# Patient Record
Sex: MALE | Race: WHITE | Employment: OTHER | URBAN - METROPOLITAN AREA
[De-identification: names, ages, dates, MRNs, and addresses within clinical notes are randomized per-mention and may not be internally consistent; named-entity substitution may affect disease eponyms.]

---

## 2017-03-18 ENCOUNTER — APPOINTMENT (OUTPATIENT)
Dept: GENERAL RADIOLOGY | Age: 72
DRG: 190 | End: 2017-03-18
Attending: FAMILY MEDICINE
Payer: MEDICARE

## 2017-03-18 ENCOUNTER — HOSPITAL ENCOUNTER (INPATIENT)
Age: 72
LOS: 5 days | Discharge: HOME HEALTH | DRG: 190 | End: 2017-03-24
Attending: FAMILY MEDICINE | Admitting: INTERNAL MEDICINE
Payer: MEDICARE

## 2017-03-18 DIAGNOSIS — J44.1 ACUTE EXACERBATION OF CHRONIC OBSTRUCTIVE PULMONARY DISEASE (COPD) (HCC): Primary | ICD-10-CM

## 2017-03-18 DIAGNOSIS — R06.03 RESPIRATORY DISTRESS: ICD-10-CM

## 2017-03-18 LAB
ALBUMIN SERPL BCP-MCNC: 3.2 G/DL (ref 3.4–5)
ALBUMIN/GLOB SERPL: 0.9 {RATIO} (ref 0.8–1.7)
ALP SERPL-CCNC: 67 U/L (ref 45–117)
ALT SERPL-CCNC: 21 U/L (ref 16–61)
ANION GAP BLD CALC-SCNC: 14 MMOL/L (ref 3–18)
AST SERPL W P-5'-P-CCNC: 16 U/L (ref 15–37)
BASOPHILS # BLD AUTO: 0.1 K/UL (ref 0–0.06)
BASOPHILS # BLD: 1 % (ref 0–2)
BILIRUB SERPL-MCNC: 0.6 MG/DL (ref 0.2–1)
BNP SERPL-MCNC: 56 PG/ML (ref 0–900)
BUN SERPL-MCNC: 24 MG/DL (ref 7–18)
BUN/CREAT SERPL: 14 (ref 12–20)
CALCIUM SERPL-MCNC: 8.9 MG/DL (ref 8.5–10.1)
CHLORIDE SERPL-SCNC: 102 MMOL/L (ref 100–108)
CK MB CFR SERPL CALC: 2.4 % (ref 0–4)
CK MB SERPL-MCNC: 7.1 NG/ML (ref 5–25)
CK SERPL-CCNC: 294 U/L (ref 39–308)
CO2 SERPL-SCNC: 22 MMOL/L (ref 21–32)
CREAT SERPL-MCNC: 1.67 MG/DL (ref 0.6–1.3)
DIFFERENTIAL METHOD BLD: ABNORMAL
EOSINOPHIL # BLD: 0.7 K/UL (ref 0–0.4)
EOSINOPHIL NFR BLD: 8 % (ref 0–5)
ERYTHROCYTE [DISTWIDTH] IN BLOOD BY AUTOMATED COUNT: 14 % (ref 11.6–14.5)
EST. AVERAGE GLUCOSE BLD GHB EST-MCNC: 123 MG/DL
GLOBULIN SER CALC-MCNC: 3.7 G/DL (ref 2–4)
GLUCOSE BLD STRIP.AUTO-MCNC: 200 MG/DL (ref 70–110)
GLUCOSE SERPL-MCNC: 108 MG/DL (ref 74–99)
HBA1C MFR BLD: 5.9 % (ref 4.5–5.6)
HCT VFR BLD AUTO: 36.1 % (ref 36–48)
HGB BLD-MCNC: 12.3 G/DL (ref 13–16)
LYMPHOCYTES # BLD AUTO: 7 % (ref 21–52)
LYMPHOCYTES # BLD: 0.6 K/UL (ref 0.9–3.6)
MCH RBC QN AUTO: 30.7 PG (ref 24–34)
MCHC RBC AUTO-ENTMCNC: 34.1 G/DL (ref 31–37)
MCV RBC AUTO: 90 FL (ref 74–97)
MONOCYTES # BLD: 1.3 K/UL (ref 0.05–1.2)
MONOCYTES NFR BLD AUTO: 16 % (ref 3–10)
NEUTS SEG # BLD: 5.6 K/UL (ref 1.8–8)
NEUTS SEG NFR BLD AUTO: 68 % (ref 40–73)
PLATELET # BLD AUTO: 221 K/UL (ref 135–420)
PMV BLD AUTO: 8.8 FL (ref 9.2–11.8)
POTASSIUM SERPL-SCNC: 3.9 MMOL/L (ref 3.5–5.5)
PROT SERPL-MCNC: 6.9 G/DL (ref 6.4–8.2)
RBC # BLD AUTO: 4.01 M/UL (ref 4.7–5.5)
RBC MORPH BLD: ABNORMAL
SODIUM SERPL-SCNC: 138 MMOL/L (ref 136–145)
TROPONIN I SERPL-MCNC: <0.02 NG/ML (ref 0–0.06)
WBC # BLD AUTO: 8.3 K/UL (ref 4.6–13.2)

## 2017-03-18 PROCEDURE — 74011000250 HC RX REV CODE- 250: Performed by: FAMILY MEDICINE

## 2017-03-18 PROCEDURE — 85025 COMPLETE CBC W/AUTO DIFF WBC: CPT | Performed by: FAMILY MEDICINE

## 2017-03-18 PROCEDURE — 99285 EMERGENCY DEPT VISIT HI MDM: CPT

## 2017-03-18 PROCEDURE — 74011250637 HC RX REV CODE- 250/637: Performed by: INTERNAL MEDICINE

## 2017-03-18 PROCEDURE — 74011250636 HC RX REV CODE- 250/636: Performed by: INTERNAL MEDICINE

## 2017-03-18 PROCEDURE — 83036 HEMOGLOBIN GLYCOSYLATED A1C: CPT | Performed by: INTERNAL MEDICINE

## 2017-03-18 PROCEDURE — 94640 AIRWAY INHALATION TREATMENT: CPT

## 2017-03-18 PROCEDURE — 99218 HC RM OBSERVATION: CPT

## 2017-03-18 PROCEDURE — 77030013140 HC MSK NEB VYRM -A

## 2017-03-18 PROCEDURE — 74011250636 HC RX REV CODE- 250/636: Performed by: FAMILY MEDICINE

## 2017-03-18 PROCEDURE — 96374 THER/PROPH/DIAG INJ IV PUSH: CPT

## 2017-03-18 PROCEDURE — 80053 COMPREHEN METABOLIC PANEL: CPT | Performed by: FAMILY MEDICINE

## 2017-03-18 PROCEDURE — 96375 TX/PRO/DX INJ NEW DRUG ADDON: CPT

## 2017-03-18 PROCEDURE — 94760 N-INVAS EAR/PLS OXIMETRY 1: CPT

## 2017-03-18 PROCEDURE — 93005 ELECTROCARDIOGRAM TRACING: CPT

## 2017-03-18 PROCEDURE — 83880 ASSAY OF NATRIURETIC PEPTIDE: CPT | Performed by: FAMILY MEDICINE

## 2017-03-18 PROCEDURE — 74011000250 HC RX REV CODE- 250: Performed by: INTERNAL MEDICINE

## 2017-03-18 PROCEDURE — 82550 ASSAY OF CK (CPK): CPT | Performed by: FAMILY MEDICINE

## 2017-03-18 PROCEDURE — 82962 GLUCOSE BLOOD TEST: CPT

## 2017-03-18 PROCEDURE — 71010 XR CHEST PORT: CPT

## 2017-03-18 RX ORDER — FUROSEMIDE 10 MG/ML
40 INJECTION INTRAMUSCULAR; INTRAVENOUS
Status: COMPLETED | OUTPATIENT
Start: 2017-03-18 | End: 2017-03-18

## 2017-03-18 RX ORDER — IPRATROPIUM BROMIDE AND ALBUTEROL SULFATE 2.5; .5 MG/3ML; MG/3ML
3 SOLUTION RESPIRATORY (INHALATION)
Status: DISCONTINUED | OUTPATIENT
Start: 2017-03-18 | End: 2017-03-21

## 2017-03-18 RX ORDER — LEVOFLOXACIN 5 MG/ML
500 INJECTION, SOLUTION INTRAVENOUS EVERY 24 HOURS
Status: DISCONTINUED | OUTPATIENT
Start: 2017-03-18 | End: 2017-03-22

## 2017-03-18 RX ORDER — INSULIN LISPRO 100 [IU]/ML
INJECTION, SOLUTION INTRAVENOUS; SUBCUTANEOUS
Status: DISCONTINUED | OUTPATIENT
Start: 2017-03-18 | End: 2017-03-24 | Stop reason: HOSPADM

## 2017-03-18 RX ORDER — ALBUTEROL SULFATE 0.83 MG/ML
5 SOLUTION RESPIRATORY (INHALATION) ONCE
Status: COMPLETED | OUTPATIENT
Start: 2017-03-18 | End: 2017-03-18

## 2017-03-18 RX ORDER — ALBUTEROL SULFATE 0.83 MG/ML
2.5 SOLUTION RESPIRATORY (INHALATION)
Status: DISCONTINUED | OUTPATIENT
Start: 2017-03-18 | End: 2017-03-24 | Stop reason: HOSPADM

## 2017-03-18 RX ORDER — HEPARIN SODIUM 5000 [USP'U]/ML
5000 INJECTION, SOLUTION INTRAVENOUS; SUBCUTANEOUS EVERY 8 HOURS
Status: DISCONTINUED | OUTPATIENT
Start: 2017-03-18 | End: 2017-03-24 | Stop reason: HOSPADM

## 2017-03-18 RX ORDER — ACETAMINOPHEN 325 MG/1
650 TABLET ORAL
Status: DISCONTINUED | OUTPATIENT
Start: 2017-03-18 | End: 2017-03-24 | Stop reason: HOSPADM

## 2017-03-18 RX ORDER — DEXTROSE 50 % IN WATER (D50W) INTRAVENOUS SYRINGE
25-50 AS NEEDED
Status: DISCONTINUED | OUTPATIENT
Start: 2017-03-18 | End: 2017-03-24 | Stop reason: HOSPADM

## 2017-03-18 RX ORDER — MAGNESIUM SULFATE 100 %
4 CRYSTALS MISCELLANEOUS AS NEEDED
Status: DISCONTINUED | OUTPATIENT
Start: 2017-03-18 | End: 2017-03-24 | Stop reason: HOSPADM

## 2017-03-18 RX ORDER — SODIUM CHLORIDE 9 MG/ML
75 INJECTION, SOLUTION INTRAVENOUS CONTINUOUS
Status: DISPENSED | OUTPATIENT
Start: 2017-03-18 | End: 2017-03-19

## 2017-03-18 RX ORDER — DOXAZOSIN 1 MG/1
2 TABLET ORAL DAILY
Status: DISCONTINUED | OUTPATIENT
Start: 2017-03-19 | End: 2017-03-24 | Stop reason: HOSPADM

## 2017-03-18 RX ORDER — FLUTICASONE FUROATE AND VILANTEROL 100; 25 UG/1; UG/1
1 POWDER RESPIRATORY (INHALATION) DAILY
Status: DISCONTINUED | OUTPATIENT
Start: 2017-03-19 | End: 2017-03-24 | Stop reason: HOSPADM

## 2017-03-18 RX ORDER — IPRATROPIUM BROMIDE AND ALBUTEROL SULFATE 2.5; .5 MG/3ML; MG/3ML
3 SOLUTION RESPIRATORY (INHALATION)
Status: COMPLETED | OUTPATIENT
Start: 2017-03-18 | End: 2017-03-18

## 2017-03-18 RX ORDER — LISINOPRIL 20 MG/1
40 TABLET ORAL DAILY
Status: DISCONTINUED | OUTPATIENT
Start: 2017-03-19 | End: 2017-03-19

## 2017-03-18 RX ORDER — CHLORTHALIDONE 25 MG/1
25 TABLET ORAL DAILY
Status: DISCONTINUED | OUTPATIENT
Start: 2017-03-19 | End: 2017-03-24 | Stop reason: HOSPADM

## 2017-03-18 RX ADMIN — INSULIN LISPRO 4 UNITS: 100 INJECTION, SOLUTION INTRAVENOUS; SUBCUTANEOUS at 21:42

## 2017-03-18 RX ADMIN — FUROSEMIDE 40 MG: 10 INJECTION, SOLUTION INTRAMUSCULAR; INTRAVENOUS at 16:42

## 2017-03-18 RX ADMIN — IPRATROPIUM BROMIDE AND ALBUTEROL SULFATE 3 ML: .5; 3 SOLUTION RESPIRATORY (INHALATION) at 21:17

## 2017-03-18 RX ADMIN — LEVOFLOXACIN 500 MG: 5 INJECTION, SOLUTION INTRAVENOUS at 19:30

## 2017-03-18 RX ADMIN — METHYLPREDNISOLONE SODIUM SUCCINATE 125 MG: 125 INJECTION, POWDER, FOR SOLUTION INTRAMUSCULAR; INTRAVENOUS at 16:42

## 2017-03-18 RX ADMIN — HEPARIN SODIUM 5000 UNITS: 5000 INJECTION, SOLUTION INTRAVENOUS; SUBCUTANEOUS at 19:29

## 2017-03-18 RX ADMIN — ALBUTEROL SULFATE 5 MG: 2.5 SOLUTION RESPIRATORY (INHALATION) at 21:17

## 2017-03-18 RX ADMIN — ALBUTEROL SULFATE 5 MG: 2.5 SOLUTION RESPIRATORY (INHALATION) at 16:50

## 2017-03-18 RX ADMIN — Medication 2 SPRAY: at 19:33

## 2017-03-18 RX ADMIN — SODIUM CHLORIDE 75 ML/HR: 900 INJECTION, SOLUTION INTRAVENOUS at 19:30

## 2017-03-18 RX ADMIN — METHYLPREDNISOLONE SODIUM SUCCINATE 40 MG: 40 INJECTION, POWDER, FOR SOLUTION INTRAMUSCULAR; INTRAVENOUS at 21:42

## 2017-03-18 RX ADMIN — IPRATROPIUM BROMIDE AND ALBUTEROL SULFATE 3 ML: .5; 3 SOLUTION RESPIRATORY (INHALATION) at 16:41

## 2017-03-18 NOTE — H&P
History & Physical    Patient: Ines Howell MRN: [id number]  CSN: [id number]    YOB: 1945  Age: 66 y.o. Sex: male      DOA: 3/18/2017  Primary Care Provider:  Macario Millan MD      Assessment/Plan   1. COPD exacerbation  2. SInusitis  3. Renal insufficiency  4. Scarring R lung, chronic  5 presbyacusis    PLAN:  - Admit to medical service with telemetry monitoring  - start steroids, antibiotics, scheudled nebs  - saline nasal spray  - monitor sugar ac/hs  - gentle fluids  - mobilize w pt/ot  0 full code, dvt ppx heparin          Patient Active Problem List   Diagnosis Code    COPD (chronic obstructive pulmonary disease) ([address] J44.9    URIEL (acute kidney injury) ([facility] N17.9    Hypertension I10     HPI:   CC: shortness of breath  Ines Howell is a 66 y.o. male with past medical history significant for COPD, HTn, prostate ca presents to  ER w 3 days of worsening shortness of breath and cough. stared w nasal congestion but no sputum producible. No alleviating or aggravating factors. No fever, chills. Denies pain, palpitations. On presentation to the ER he was afebrile, tachycaridc satting in mid 90s while on supplemental oxygen. He had expiratory wheezes diffusely that persisted despite neb treatments. CXR w R LL scarring unchanged from prior. EKG with out acute finding, cr mildly elevated. Medicine is asked to admit for further management.        Past Medical History:   Diagnosis Date    Cancer [facility])     prostate    COPD (chronic obstructive pulmonary disease) ([facility] [address]     Hypertension     Pneumonia     Radiation effect      Past Surgical History:   Procedure Laterality Date    HX HERNIA REPAIR        Social History   Substance Use Topics    Smoking status: Current Every Day Smoker     Types: Cigarettes    Smokeless tobacco: None    Alcohol use No     Family History   Problem Relation Age of Onset    Heart Disease Mother      No current facility-administered medications on file prior to encounter. Current Outpatient Prescriptions on File Prior to Encounter   Medication Sig Dispense Refill    albuterol (PROVENTIL VENTOLIN) 2.5 mg /3 mL (0.083 %) nebulizer solution 3 mL by Nebulization route every four (4) hours as needed for Wheezing. 24 Each 0    budesonide-formoterol (SYMBICORT) 160-4.5 mcg/actuation HFA inhaler Take 2 Puffs by inhalation two (2) times a day. 1 Inhaler 0    lisinopril (PRINIVIL, ZESTRIL) 40 mg tablet Take 40 mg by mouth daily.  chlorthalidone (HYGROTEN) 25 mg tablet Take  by mouth daily.  albuterol (PROVENTIL HFA, VENTOLIN HFA, PROAIR HFA) 90 mcg/actuation inhaler Take 2 Puffs by inhalation every four (4) hours as needed for Wheezing or Shortness of Breath. 1 Inhaler 1    DOXAZOSIN MESYLATE (CARDURA PO) Take  by mouth.       Nebulizer & Compressor machine Dispense: 1 nebulizer machine w all tubing necessary 1 each 0      Allergies   Allergen Reactions    Aspirin Other (comments)     \"messes my stomach up\"         Review of Systems  Constitutional: No fever, chills, diaphoresis, malaise,+ fatigue - weight gain/loss or falls  Skin: no itching or rashes  HEENT: no neck stiffness, +hearing loss,- tinnitus, epistaxis or sore throat  EYES: no blurry vision, double vision or photophobia  CARDIOVASCULAR: no, cp, palpitations, orthopnea, pnd or LE edema  PULMONARY: no cough, wheeze, shortness of breath or sputum production  GI: no nausea, vomiting, diarrhea, abdominal pain, melena, hematemesis or brbpr  : no dysuria, hematuria  MUSCULOSKELETAL: no back pain, joint pain or myalgias  ENDOCRINE: no heat/cold intolerance, polyuria or polydipsia  HEME: no easy bruising or bleeding  NEURO: no unilateral weakness, numbness, tingling or seizures      Physical Exam:        Visit Vitals    /67    Pulse 88    Temp 97.9 °F (36.6 °C)    Resp 23    Ht 5' 9\" (1.753 m)    Wt 90.4 kg (199 lb 4.8 oz)    SpO2 96%    BMI 29.43 kg/m2    O2 Flow Rate (L/min): 1.5 l/min O2 Device: Aerosol mask    Temp (24hrs), Av.9 °F (36.6 °C), Min:97.9 °F (36.6 °C), Max:97.9 °F (36.6 °C)           Body mass index is 29.43 kg/(m^2). General:  Awake, cooperative, no distress. Head:  Normocephalic, without obvious abnormality, atraumatic. Eyes:  Conjunctivae/corneas clear, sclera anicteric, PERRL, EOMs intact. Nose: Nares normal. No drainage or sinus tenderness. Throat: Lips, mucosa, and tongue normal. .   Neck: Supple, symmetrical, trachea midline, no adenopathy. \   Lungs:   Scattered expiratory wheezes bilaterally, equal expansion       Heart:  Regular rate and rhythm, S1, S2 normal, no murmur, click, rub or gallop, cap refill normal      Abdomen: Soft, non-tender. Bowel sounds normal. No masses,  No organomegaly. Extremities: Extremities normal, atraumatic, no cyanosis or edema. Pulses: 2+ and symmetric all extremities. Skin: Skin color pale, texture, turgor normal. No rashes or lesions   Neurologic: CNII-XII intact. No focal motor or sensory deficit.            Laboratory Studies:    CMP:   Lab Results   Component Value Date/Time     2017 04:30 PM    K 3.9 2017 04:30 PM     2017 04:30 PM    CO2 22 2017 04:30 PM    AGAP 14 2017 04:30 PM     (H) 2017 04:30 PM    BUN 24 (H) 2017 04:30 PM    CREA 1.67 (H) 2017 04:30 PM    GFRAA 49 (L) 2017 04:30 PM    GFRNA 41 (L) 2017 04:30 PM    CA 8.9 2017 04:30 PM    ALB 3.2 (L) 2017 04:30 PM    TP 6.9 2017 04:30 PM    GLOB 3.7 2017 04:30 PM    AGRAT 0.9 2017 04:30 PM    SGOT 16 2017 04:30 PM    ALT 21 2017 04:30 PM     CBC:   Lab Results   Component Value Date/Time    WBC 8.3 2017 04:30 PM    HGB 12.3 (L) 2017 04:30 PM    HCT 36.1 2017 04:30 PM     2017 04:30 PM       Imaging studies personally reviewed:  CXR: RLL mickey Jones DO  Internal Medicine/Geriatrics

## 2017-03-18 NOTE — ED PROVIDER NOTES
[address]  EMERGENCY DEPARTMENT HISTORY AND PHYSICAL EXAM       Date: 3/18/2017   Patient Name: Danni Mckenna   YOB: 1945  Medical Record Number: [id number]    History of Presenting Illness     No chief complaint on file. History Provided By:  patient and spouse    Additional History:   4:25 PM   Danni Mckenna is a 66 y.o. male h/o COPD who presents to the emergency department C/O gradually worsening SOB, onset 1 week ago. Symptoms include chest tightness and LE edema. Per wife, this is the worse the pt's breathing has ever been. Pt smoked 2 puffs of a cigarette this morning. Pt denies nausea, vomiting, and any other symptoms or complaints. Primary Care Provider: Juliet Chacko MD   Specialist:    Past History     Past Medical History:   Past Medical History:   Diagnosis Date    COPD (chronic obstructive pulmonary disease) ([address]     Hypertension     Pneumonia         Past Surgical History:   Past Surgical History:   Procedure Laterality Date    HX HERNIA REPAIR          Family History:   Family History   Problem Relation Age of Onset    Heart Disease Mother         Social History:   Social History   Substance Use Topics    Smoking status: Current Every Day Smoker     Types: Cigarettes    Smokeless tobacco: Not on file    Alcohol use No        Allergies: Allergies   Allergen Reactions    Aspirin Other (comments)     \"messes my stomach up\"        Review of Systems   Review of Systems   Constitutional: Negative for fatigue and fever. HENT: Negative for rhinorrhea and sore throat. Respiratory: Positive for chest tightness. Negative for cough and shortness of breath. Cardiovascular: Positive for leg swelling. Negative for chest pain and palpitations. Gastrointestinal: Negative for abdominal pain, diarrhea, nausea and vomiting. Genitourinary: Negative for difficulty urinating and dysuria.    Musculoskeletal: Negative for arthralgias and myalgias. Skin: Negative for color change and rash. Neurological: Negative for light-headedness and headaches. All other systems reviewed and are negative. Physical Exam  There were no vitals filed for this visit. Physical Exam   Nursing note and vitals reviewed. Vital signs and nursing notes reviewed    CONSTITUTIONAL: Alert, in moderate respiratory distress; well-developed; well-nourished. Elderly male. HEAD:  Normocephalic, atraumatic  EYES: PERRL; EOM's intact. ENTM: Nose: no rhinorrhea; Throat: no erythema or exudate, mucous membranes moist  Neck:  No JVD, supple without lymphadenopathy  RESP: Chest with diffuse wheezing, equal breath sounds. Tachypnic. CV: S1 and S2 WNL; No murmurs, gallops or rubs. Tachycardic. GI: Normal bowel sounds, abdomen soft and non-tender. No masses or organomegaly. : No costo-vertebral angle tenderness. BACK:  Non-tender  UPPER EXT:  Normal inspection  LOWER EXT: No edema, no calf tenderness. Distal pulses intact. NEURO: CN intact, reflexes 2/4 and sym, strength 5/5 and sym, sensation intact. SKIN: No rashes; Normal for age and stage. PSYCH:  Alert and oriented, normal affect. Diagnostic Study Results     Labs -    No results found for this or any previous visit (from the past 12 hour(s)). Radiologic Studies -  XR CHEST PORT    (Results Pending)        Medical Decision Making   I am the first provider for this patient. I reviewed the vital signs, available nursing notes, past medical history, past surgical history, family history and social history. Vital Signs-Reviewed the patient's vital signs. No data found. Pulse Oximetry Analysis - Normal 99% on room air. Cardiac Monitor:   Rate: 89  Rhythm: Normal Sinus Rhythm      EKG interpretation: (Preliminary)  Sinus rhythm with possible premature atrial complexes with aberrant conduction. Rate: 100 bpm. Left axis deviation.    EKG read by Richard Cisneros MD at 4:28 PM      Old Medical Records: Old medical records. Nursing notes. Provider Notes:   INITIAL CLINICAL IMPRESSION and PLANS:  The patient presents with the primary complaint(s) of: SOB. The presentation, to include historical aspects and clinical findings are consistent with the DX of COPD exacerbation. However, other possible DX's to consider as primary, associated with, or exacerbated by include:    1. PNA  2. CHF    Considering the above, my initial management plan to evaluate and therapeutic interventions include the following and as noted in the orders:    1. Labs: CBC, Cardiac panel, CMP, BMP, PRO-BNP  2. Imaging: EKG, CXR      ED Course:    4:25 PM    Initial assessment performed. 6:00 PM   Pt is doing much better. Still wheezing but no longer in respiratory distress. Stats are stable but will admit due to COPD exacerbation. 6:36 PM Discussed patient's history, exam, and available diagnostics results with Pancho Nolan DO, internal medicine, who agree with admission to telemetry for observation. 6:35 PM  Patient is being admitted to the hospital by Pancho Nolan DO. The results of their tests and reasons for their admission have been discussed with them and/or available family. They convey agreement and understanding for the need to be admitted and for their admission diagnosis. CONDITIONS ON ADMISSION:  Deep Vein Thrombosis is not present at the time of admission. Thrombosis is not present at the time of admission. Urinary Tract Infection is not present at the time of admission. Pneumonia is not present at the time of admission. MRSA is not present at the time of admission. Wound infection is not present at the time of admission. Pressure Ulcer is not present at the time of admission. Medications Given in the ED:  Medications - No data to display    Diagnosis   Clinical Impression:   No diagnosis found. Discussion:  Pt presented C/O increased SOB over the past couple of days.  He was wheezing in respiratory distress upon arrival He was given multiple nebs with relief. CXR negative. Labs normal. He will be admitted for COPD exacerbation. 7:07 PM  I have spent 30 minutes of critical care time involved in lab review, consultations with specialist, family decision-making, and documentation. During this entire length of time I was immediately available to the patient. Critical Care: The reason for providing this level of medical care for this critically ill patient was due a critical illness that impaired one or more vital organ systems such that there was a high probability of imminent or life threatening deterioration in the patients condition. This care involved high complexity decision making to assess, manipulate, and support vital system functions, to treat this degreee vital organ system failure and to prevent further life threatening deterioration of the patients condition. _______________________________   Attestations: This note is prepared by Winford Person, acting as Scribe for Melly Prasad MD.    Melly Prasad MD: The scribe's documentation has been prepared under my direction and personally reviewed by me in its entirety.  I confirm that the note above accurately reflects all work, treatment, procedures, and medical decision making performed by me.    _______________________________

## 2017-03-18 NOTE — Clinical Note
Patient Class[de-identified] Observation [509] Type of Bed: Telemetry [19] Reason for Observation: nebs, treatment Admitting Physician: Rashmi Leigh Attending Physician: Rashmi Leigh Diagnosis: copd exacerbation

## 2017-03-18 NOTE — ROUTINE PROCESS
TRANSFER - OUT REPORT:    Verbal report given to STEVEN BARR RN(name) on Ana Baeza  being transferred to telemetry remote(unit) for routine progression of care       Report consisted of patients Situation, Background, Assessment and   Recommendations(SBAR). Mews score of 2. Information from the following report(s) SBAR, Kardex, ED Summary and MAR was reviewed with the receiving nurse. Lines:   Peripheral IV 03/18/17 Right Wrist (Active)   Site Assessment Clean, dry, & intact 3/18/2017  4:30 PM   Phlebitis Assessment 0 3/18/2017  4:30 PM   Infiltration Assessment 0 3/18/2017  4:30 PM   Dressing Status Clean, dry, & intact 3/18/2017  4:30 PM   Dressing Type Tape;Transparent 3/18/2017  4:30 PM   Hub Color/Line Status Green;Flushed 3/18/2017  4:30 PM   Action Taken Blood drawn 3/18/2017  4:30 PM        Opportunity for questions and clarification was provided.       Patient transported with:   Monitor  O2 @ 1.5 liters  Tech

## 2017-03-18 NOTE — ED NOTES
RN in room for purpose of hourly rounding. Room checked for trash and safety hazards. Patient is. ..    [  ] seated at bedside. [  ] lying in bed with side rails up and call bell in reach. [ x ] lying in with call bell in reach and continuous monitoring in place. Pain reduction interventions. .. [x  ] not indicated at this time      include the following   [  ] administration of analgesic medications   [  ] lights turned down   [  ] patient offered a cool washcloth   [  ] heat applied   [  ] ice applied   [  ] patient repositioned    Restroom needs addressed. Patient is. ...    [  ] Not in need restroom assistance at this time  [  ] Ambulatory as needed to the restroom  [  ] Assisted to bedside commode  [  ] Assisted with use of bed pan  [ x ] Provided with a urinal    Position. .. [ x ] Patient does not require assistance with repositioning  [  ] Patient repositions with assistance of RN  [  ] Patient repositioned with assistance of RN and other ED staff.

## 2017-03-18 NOTE — IP AVS SNAPSHOT
Current Discharge Medication List  
  
START taking these medications Dose & Instructions Dispensing Information Comments Morning Noon Evening Bedtime  
 guaiFENesin  mg ER tablet Commonly known as:  Jong Ngo Your last dose was: Your next dose is:    
   
   
 Dose:  600 mg Take 1 Tab by mouth every twelve (12) hours. Quantity:  60 Tab Refills:  0  
     
   
   
   
  
 levoFLOXacin 500 mg tablet Commonly known as:  Cristi Parsons Your last dose was: Your next dose is:    
   
   
 Dose:  500 mg Take 1 Tab by mouth every twenty-four (24) hours. Quantity:  3 Tab Refills:  0  
     
   
   
   
  
 montelukast 10 mg tablet Commonly known as:  SINGULAIR Your last dose was: Your next dose is:    
   
   
 Dose:  10 mg Take 1 Tab by mouth nightly. Quantity:  30 Tab Refills:  0  
     
   
   
   
  
 predniSONE 50 mg tablet Commonly known as:  Patricia Henry Your last dose was: Your next dose is:    
   
   
 Dose:  50 mg Take 1 Tab by mouth daily (with lunch). Quantity:  5 Tab Refills:  0  
     
   
   
   
  
 roflumilast Tab tablet Commonly known as:  Aniya Staton Your last dose was: Your next dose is:    
   
   
 Dose:  500 mcg Take 1 Tab by mouth daily. Quantity:  30 Tab Refills:  0  
     
   
   
   
  
 umeclidinium 62.5 mcg/actuation inhaler Commonly known as:  INCRUSE ELLIPTA Your last dose was: Your next dose is:    
   
   
 Dose:  1 Puff Take 1 Puff by inhalation daily. Quantity:  1 Inhaler Refills:  0 CONTINUE these medications which have NOT CHANGED Dose & Instructions Dispensing Information Comments Morning Noon Evening Bedtime * albuterol 90 mcg/actuation inhaler Commonly known as:  PROVENTIL HFA, VENTOLIN HFA, PROAIR HFA Your last dose was: Your next dose is:    
   
   
 Dose:  2 Puff Take 2 Puffs by inhalation every four (4) hours as needed for Wheezing or Shortness of Breath. Quantity:  1 Inhaler Refills:  1  
     
   
   
   
  
 * albuterol 2.5 mg /3 mL (0.083 %) nebulizer solution Commonly known as:  PROVENTIL VENTOLIN Your last dose was: Your next dose is:    
   
   
 Dose:  2.5 mg  
3 mL by Nebulization route every four (4) hours as needed for Wheezing. Quantity:  24 Each Refills:  0  
     
   
   
   
  
 budesonide-formoterol 160-4.5 mcg/actuation HFA inhaler Commonly known as:  SYMBICORT Your last dose was: Your next dose is:    
   
   
 Dose:  2 Puff Take 2 Puffs by inhalation two (2) times a day. Quantity:  1 Inhaler Refills:  0  
     
   
   
   
  
 CARDURA PO Your last dose was: Your next dose is: Take  by mouth. Refills:  0  
     
   
   
   
  
 chlorthalidone 25 mg tablet Commonly known as:  Suzen Pay Your last dose was: Your next dose is: Take  by mouth daily. Refills:  0 Nebulizer & Compressor machine Your last dose was: Your next dose is:    
   
   
 Dispense: 1 nebulizer machine w all tubing necessary Quantity:  1 each Refills:  0  
     
   
   
   
  
 * Notice: This list has 2 medication(s) that are the same as other medications prescribed for you. Read the directions carefully, and ask your doctor or other care provider to review them with you. STOP taking these medications   
 lisinopril 40 mg tablet Commonly known as:  Scotty Mcwilliams Where to Get Your Medications Information on where to get these meds will be given to you by the nurse or doctor. ! Ask your nurse or doctor about these medications  
  guaiFENesin  mg ER tablet  
 levoFLOXacin 500 mg tablet  
 montelukast 10 mg tablet  
 predniSONE 50 mg tablet  
 roflumilast Tab tablet umeclidinium 62.5 mcg/actuation inhaler

## 2017-03-18 NOTE — ED TRIAGE NOTES
C/o shortness of breath that has been getting worse x1 week. Pt has COPD. Sepsis Screening completed    (x )Patient meets SIRS criteria. (  )Patient does not meet SIRS criteria.       SIRS Criteria is achieved when two or more of the following are present   Temperature < 96.8°F (36°C) or > 100.9°F (38.3°C)   Heart Rate > 90 beats per minute   Respiratory Rate > 20 breaths per minute   WBC count > 12,000 or <4,000 or > 10% bands

## 2017-03-18 NOTE — ED NOTES
Bedside report complete. Patient was introduced to oncoming [facility]. Patient updated on plan of care. Safety check performed: Bed locked in low posiiton. Side rails up. Call bell and personal items within reach.

## 2017-03-18 NOTE — IP AVS SNAPSHOT
Michael Simon 
 
 
 [address] [facility] [address] 
[phone] Patient: Dominic Patton MRN: [id number] IAP:7/14/7227 You are allergic to the following Allergen Reactions Aspirin Other (comments) \"messes my stomach up\" Recent Documentation Height Weight BMI Smoking Status 1.753 m 86 kg 27.98 kg/m2 Current Every Day Smoker Emergency Contacts Name Discharge Info Relation Home Work Mobile Prasanna Parknie DISCHARGE CAREGIVER [3] Spouse [3] [phone] About your hospitalization You were admitted on:  March 18, 2017 You last received care in the:  [facility] You were discharged on:  March 24, 2017 Unit phone number:  [phone] Why you were hospitalized Your primary diagnosis was:  Copd (Chronic Obstructive Pulmonary Disease) ([facility]) Your diagnoses also included:  Hypertension, Jcarlos (Acute Kidney Injury) ([facility]), Tobacco Abuse Providers Seen During Your Hospitalizations Provider Role Specialty Primary office phone Kaylee Mims MD Attending Provider Emergency Medicine [phone] Wes Amato DO Attending Provider Internal Medicine [phone] Yuliana Caal MD Attending Provider Internal Medicine [phone] Your Primary Care Physician (PCP) Primary Care Physician Office Phone Office Fax Aisha Parikhh, [address] [phone] Follow-up Information Follow up With Details Comments Contact Info [address] [place] [address]   [phone] Nic Zaragoza MD In 1 week [de-identified] office is closed this afternoon. Please call Monday morning to schedule a follow-up appointment. 1 [facility],[address] [place] [address] 
[phone] Current Discharge Medication List  
  
START taking these medications Dose & Instructions Dispensing Information Comments Morning Noon Evening Bedtime  
 guaiFENesin  mg ER tablet Commonly known as:  Be & Be Your last dose was: Your next dose is:    
   
   
 Dose:  600 mg Take 1 Tab by mouth every twelve (12) hours. Quantity:  60 Tab Refills:  0  
     
   
   
   
  
 levoFLOXacin 500 mg tablet Commonly known as:  Kenvelasquez Jose Your last dose was: Your next dose is:    
   
   
 Dose:  500 mg Take 1 Tab by mouth every twenty-four (24) hours. Quantity:  3 Tab Refills:  0  
     
   
   
   
  
 montelukast 10 mg tablet Commonly known as:  SINGULAIR Your last dose was: Your next dose is:    
   
   
 Dose:  10 mg Take 1 Tab by mouth nightly. Quantity:  30 Tab Refills:  0  
     
   
   
   
  
 predniSONE 50 mg tablet Commonly known as:  Wili Dally Your last dose was: Your next dose is:    
   
   
 Dose:  50 mg Take 1 Tab by mouth daily (with lunch). Quantity:  5 Tab Refills:  0  
     
   
   
   
  
 roflumilast Tab tablet Commonly known as:  Sita Montes Your last dose was: Your next dose is:    
   
   
 Dose:  500 mcg Take 1 Tab by mouth daily. Quantity:  30 Tab Refills:  0  
     
   
   
   
  
 umeclidinium 62.5 mcg/actuation inhaler Commonly known as:  INCRUSE ELLIPTA Your last dose was: Your next dose is:    
   
   
 Dose:  1 Puff Take 1 Puff by inhalation daily. Quantity:  1 Inhaler Refills:  0 CONTINUE these medications which have NOT CHANGED Dose & Instructions Dispensing Information Comments Morning Noon Evening Bedtime * albuterol 90 mcg/actuation inhaler Commonly known as:  PROVENTIL HFA, VENTOLIN HFA, PROAIR HFA Your last dose was: Your next dose is:    
   
   
 Dose:  2 Puff Take 2 Puffs by inhalation every four (4) hours as needed for Wheezing or Shortness of Breath. Quantity:  1 Inhaler Refills:  1 * albuterol 2.5 mg /3 mL (0.083 %) nebulizer solution Commonly known as:  PROVENTIL VENTOLIN Your last dose was: Your next dose is:    
   
   
 Dose:  2.5 mg  
3 mL by Nebulization route every four (4) hours as needed for Wheezing. Quantity:  24 Each Refills:  0  
     
   
   
   
  
 budesonide-formoterol 160-4.5 mcg/actuation HFA inhaler Commonly known as:  SYMBICORT Your last dose was: Your next dose is:    
   
   
 Dose:  2 Puff Take 2 Puffs by inhalation two (2) times a day. Quantity:  1 Inhaler Refills:  0  
     
   
   
   
  
 CARDURA PO Your last dose was: Your next dose is: Take  by mouth. Refills:  0  
     
   
   
   
  
 chlorthalidone 25 mg tablet Commonly known as:  Teresa Bugler Your last dose was: Your next dose is: Take  by mouth daily. Refills:  0 Nebulizer & Compressor machine Your last dose was: Your next dose is:    
   
   
 Dispense: 1 nebulizer machine w all tubing necessary Quantity:  1 each Refills:  0  
     
   
   
   
  
 * Notice: This list has 2 medication(s) that are the same as other medications prescribed for you. Read the directions carefully, and ask your doctor or other care provider to review them with you. STOP taking these medications   
 lisinopril 40 mg tablet Commonly known as:  Donnald Code Where to Get Your Medications Information on where to get these meds will be given to you by the nurse or doctor. ! Ask your nurse or doctor about these medications  
  guaiFENesin  mg ER tablet  
 levoFLOXacin 500 mg tablet  
 montelukast 10 mg tablet  
 predniSONE 50 mg tablet  
 roflumilast Tab tablet  
 umeclidinium 62.5 mcg/actuation inhaler Discharge Instructions DISCHARGE SUMMARY from Nurse The following personal items are in your possession at time of discharge: 
 
Dental Appliances: None Visual Aid: Magnifying glass, At bedside Hearing Aids/Status: At home Home Medications: None Jewelry: None Clothing: Footwear, Pants, Socks, Undergarments Other Valuables: None PATIENT INSTRUCTIONS: 
 
 
F-face looks uneven A-arms unable to move or move unevenly S-speech slurred or non-existent T-time-call 911 as soon as signs and symptoms begin-DO NOT go Back to bed or wait to see if you get better-TIME IS BRAIN. Warning Signs of HEART ATTACK Call 911 if you have these symptoms: 
? Chest discomfort. Most heart attacks involve discomfort in the center of the chest that lasts more than a few minutes, or that goes away and comes back. It can feel like uncomfortable pressure, squeezing, fullness, or pain. ? Discomfort in other areas of the upper body. Symptoms can include pain or discomfort in one or both arms, the back, neck, jaw, or stomach. ? Shortness of breath with or without chest discomfort. ? Other signs may include breaking out in a cold sweat, nausea, or lightheadedness. Don't wait more than five minutes to call [address]! Fast action can save your life. Calling 911 is almost always the fastest way to get lifesaving treatment. Emergency Medical Services staff can begin treatment when they arrive  up to an hour sooner than if someone gets to the hospital by car. The discharge information has been reviewed with the patient and spouse. The patient and spouse verbalized understanding. Discharge medications reviewed with the patient and spouse and appropriate educational materials and side effects teaching were provided. Patient armband removed and shredded Discharge Orders None Vrvana Announcement We are excited to announce that we are making your provider's discharge notes available to you in Vrvana. You will see these notes when they are completed and signed by the physician that discharged you from your recent hospital stay. If you have any questions or concerns about any information you see in Vrvana, please call the Health Information Department where you were seen or reach out to your Primary Care Provider for more information about your plan of care. Introducing [facility]! Jessica Chong introduces Vrvana patient portal. Now you can access parts of your medical record, email your doctor's office, and request medication refills online. 1. In your internet browser, go to [email or web address] 2. Click on the First Time User? Click Here link in the Sign In box. You will see the New Member Sign Up page. 3. Enter your Vrvana Access Code exactly as it appears below. You will not need to use this code after youve completed the sign-up process. If you do not sign up before the expiration date, you must request a new code. · Vrvana Access Code: [email or web address] Expires: 6/16/2017  4:11 PM 
 
4. Enter the last four digits of your Social Security Number (xxxx) and Date of Birth (mm/dd/yyyy) as indicated and click Submit. You will be taken to the next sign-up page. 5. Create a Vrvana ID. This will be your Vrvana login ID and cannot be changed, so think of one that is secure and easy to remember. 6. Create a Vrvana password. You can change your password at any time. 7. Enter your Password Reset Question and Answer. This can be used at a later time if you forget your password. 8. Enter your e-mail address. You will receive e-mail notification when new information is available in [address] 9. Click Sign Up.  You can now view and download portions of your medical record. 10. Click the Download Summary menu link to download a portable copy of your medical information. If you have questions, please visit the Frequently Asked Questions section of the [employer] website. Remember, [employer] is NOT to be used for urgent needs. For medical emergencies, dial 911. Now available from your iPhone and Android! General Information Please provide this summary of care documentation to your next provider. Patient Signature:  ____________________________________________________________ Date:  ____________________________________________________________  
  
John Artist Provider Signature:  ____________________________________________________________ Date:  ____________________________________________________________

## 2017-03-18 NOTE — ED NOTES
RN in room for purpose of hourly rounding. Room checked for trash and safety hazards. Patient is. ..    [  ] seated at bedside. [  ] lying in bed with side rails up and call bell in reach. [ x lying in with call bell in reach and continuous monitoring in place. Pain reduction interventions. .. [ x ] not indicated at this time      include the following   [  ] administration of analgesic medications   [  ] lights turned down   [  ] patient offered a cool washcloth   [  ] heat applied   [  ] ice applied   [  ] patient repositioned    Restroom needs addressed. Patient is. ...    [  ] Not in need restroom assistance at this time  [  ] Ambulatory as needed to the restroom  [  ] Assisted to bedside commode  [  ] Assisted with use of bed pan  [ x ] Provided with a urinal    Position. .. [ x ] Patient does not require assistance with repositioning  [  ] Patient repositions with assistance of RN  [  ] Patient repositioned with assistance of RN and other ED staff.

## 2017-03-19 ENCOUNTER — APPOINTMENT (OUTPATIENT)
Dept: CT IMAGING | Age: 72
DRG: 190 | End: 2017-03-19
Attending: INTERNAL MEDICINE
Payer: MEDICARE

## 2017-03-19 LAB
ANION GAP BLD CALC-SCNC: 12 MMOL/L (ref 3–18)
ATRIAL RATE: 100 BPM
BASOPHILS # BLD AUTO: 0 K/UL (ref 0–0.1)
BASOPHILS # BLD: 0 % (ref 0–3)
BUN SERPL-MCNC: 26 MG/DL (ref 7–18)
BUN/CREAT SERPL: 14 (ref 12–20)
CALCIUM SERPL-MCNC: 9 MG/DL (ref 8.5–10.1)
CALCULATED P AXIS, ECG09: 68 DEGREES
CALCULATED R AXIS, ECG10: 47 DEGREES
CALCULATED T AXIS, ECG11: 68 DEGREES
CHLORIDE SERPL-SCNC: 102 MMOL/L (ref 100–108)
CO2 SERPL-SCNC: 26 MMOL/L (ref 21–32)
CREAT SERPL-MCNC: 1.84 MG/DL (ref 0.6–1.3)
DIAGNOSIS, 93000: NORMAL
DIFFERENTIAL METHOD BLD: ABNORMAL
EOSINOPHIL # BLD: 0 K/UL (ref 0–0.4)
EOSINOPHIL NFR BLD: 0 % (ref 0–5)
ERYTHROCYTE [DISTWIDTH] IN BLOOD BY AUTOMATED COUNT: 13.9 % (ref 11.6–14.5)
GLUCOSE BLD STRIP.AUTO-MCNC: 117 MG/DL (ref 70–110)
GLUCOSE BLD STRIP.AUTO-MCNC: 129 MG/DL (ref 70–110)
GLUCOSE BLD STRIP.AUTO-MCNC: 134 MG/DL (ref 70–110)
GLUCOSE BLD STRIP.AUTO-MCNC: 155 MG/DL (ref 70–110)
GLUCOSE SERPL-MCNC: 129 MG/DL (ref 74–99)
HCT VFR BLD AUTO: 36.6 % (ref 36–48)
HGB BLD-MCNC: 12.3 G/DL (ref 13–16)
LYMPHOCYTES # BLD AUTO: 2 % (ref 20–51)
LYMPHOCYTES # BLD: 0.2 K/UL (ref 0.8–3.5)
MCH RBC QN AUTO: 30.6 PG (ref 24–34)
MCHC RBC AUTO-ENTMCNC: 33.6 G/DL (ref 31–37)
MCV RBC AUTO: 91 FL (ref 74–97)
MONOCYTES # BLD: 0.2 K/UL (ref 0–1)
MONOCYTES NFR BLD AUTO: 2 % (ref 2–9)
NEUTS BAND NFR BLD MANUAL: 1 % (ref 0–5)
NEUTS SEG # BLD: 7.4 K/UL (ref 1.8–8)
NEUTS SEG NFR BLD AUTO: 95 % (ref 42–75)
P-R INTERVAL, ECG05: 154 MS
PLATELET # BLD AUTO: 239 K/UL (ref 135–420)
PMV BLD AUTO: 9.1 FL (ref 9.2–11.8)
POTASSIUM SERPL-SCNC: 4.5 MMOL/L (ref 3.5–5.5)
Q-T INTERVAL, ECG07: 346 MS
QRS DURATION, ECG06: 86 MS
QTC CALCULATION (BEZET), ECG08: 446 MS
RBC # BLD AUTO: 4.02 M/UL (ref 4.7–5.5)
RBC MORPH BLD: ABNORMAL
SODIUM SERPL-SCNC: 140 MMOL/L (ref 136–145)
VENTRICULAR RATE, ECG03: 100 BPM
WBC # BLD AUTO: 7.8 K/UL (ref 4.6–13.2)

## 2017-03-19 PROCEDURE — 85025 COMPLETE CBC W/AUTO DIFF WBC: CPT | Performed by: INTERNAL MEDICINE

## 2017-03-19 PROCEDURE — 74011000250 HC RX REV CODE- 250: Performed by: INTERNAL MEDICINE

## 2017-03-19 PROCEDURE — G8979 MOBILITY GOAL STATUS: HCPCS

## 2017-03-19 PROCEDURE — 74011250637 HC RX REV CODE- 250/637: Performed by: INTERNAL MEDICINE

## 2017-03-19 PROCEDURE — 74011250636 HC RX REV CODE- 250/636: Performed by: INTERNAL MEDICINE

## 2017-03-19 PROCEDURE — 77010033678 HC OXYGEN DAILY

## 2017-03-19 PROCEDURE — 65660000000 HC RM CCU STEPDOWN

## 2017-03-19 PROCEDURE — 71250 CT THORAX DX C-: CPT

## 2017-03-19 PROCEDURE — 80048 BASIC METABOLIC PNL TOTAL CA: CPT | Performed by: INTERNAL MEDICINE

## 2017-03-19 PROCEDURE — G8978 MOBILITY CURRENT STATUS: HCPCS

## 2017-03-19 PROCEDURE — 82962 GLUCOSE BLOOD TEST: CPT

## 2017-03-19 PROCEDURE — 97161 PT EVAL LOW COMPLEX 20 MIN: CPT

## 2017-03-19 PROCEDURE — 94760 N-INVAS EAR/PLS OXIMETRY 1: CPT

## 2017-03-19 PROCEDURE — 97116 GAIT TRAINING THERAPY: CPT

## 2017-03-19 PROCEDURE — 36415 COLL VENOUS BLD VENIPUNCTURE: CPT | Performed by: INTERNAL MEDICINE

## 2017-03-19 PROCEDURE — 94640 AIRWAY INHALATION TREATMENT: CPT

## 2017-03-19 PROCEDURE — 99218 HC RM OBSERVATION: CPT

## 2017-03-19 RX ORDER — BUDESONIDE 0.5 MG/2ML
500 INHALANT ORAL
Status: DISCONTINUED | OUTPATIENT
Start: 2017-03-19 | End: 2017-03-21

## 2017-03-19 RX ADMIN — IPRATROPIUM BROMIDE AND ALBUTEROL SULFATE 3 ML: .5; 3 SOLUTION RESPIRATORY (INHALATION) at 12:40

## 2017-03-19 RX ADMIN — METHYLPREDNISOLONE SODIUM SUCCINATE 40 MG: 40 INJECTION, POWDER, FOR SOLUTION INTRAMUSCULAR; INTRAVENOUS at 12:00

## 2017-03-19 RX ADMIN — HEPARIN SODIUM 5000 UNITS: 5000 INJECTION, SOLUTION INTRAVENOUS; SUBCUTANEOUS at 16:47

## 2017-03-19 RX ADMIN — Medication 2 SPRAY: at 22:08

## 2017-03-19 RX ADMIN — HEPARIN SODIUM 5000 UNITS: 5000 INJECTION, SOLUTION INTRAVENOUS; SUBCUTANEOUS at 02:03

## 2017-03-19 RX ADMIN — BUDESONIDE 500 MCG: 0.5 INHALANT RESPIRATORY (INHALATION) at 20:01

## 2017-03-19 RX ADMIN — METHYLPREDNISOLONE SODIUM SUCCINATE 40 MG: 40 INJECTION, POWDER, FOR SOLUTION INTRAMUSCULAR; INTRAVENOUS at 06:19

## 2017-03-19 RX ADMIN — IPRATROPIUM BROMIDE AND ALBUTEROL SULFATE 3 ML: .5; 3 SOLUTION RESPIRATORY (INHALATION) at 16:33

## 2017-03-19 RX ADMIN — DOXAZOSIN 2 MG: 1 TABLET ORAL at 08:39

## 2017-03-19 RX ADMIN — IPRATROPIUM BROMIDE AND ALBUTEROL SULFATE 3 ML: .5; 3 SOLUTION RESPIRATORY (INHALATION) at 08:21

## 2017-03-19 RX ADMIN — METHYLPREDNISOLONE SODIUM SUCCINATE 40 MG: 40 INJECTION, POWDER, FOR SOLUTION INTRAMUSCULAR; INTRAVENOUS at 16:46

## 2017-03-19 RX ADMIN — LEVOFLOXACIN 500 MG: 5 INJECTION, SOLUTION INTRAVENOUS at 16:47

## 2017-03-19 RX ADMIN — HEPARIN SODIUM 5000 UNITS: 5000 INJECTION, SOLUTION INTRAVENOUS; SUBCUTANEOUS at 12:01

## 2017-03-19 RX ADMIN — BUDESONIDE 500 MCG: 0.5 INHALANT RESPIRATORY (INHALATION) at 09:30

## 2017-03-19 RX ADMIN — METHYLPREDNISOLONE SODIUM SUCCINATE 40 MG: 40 INJECTION, POWDER, FOR SOLUTION INTRAMUSCULAR; INTRAVENOUS at 23:57

## 2017-03-19 RX ADMIN — IPRATROPIUM BROMIDE AND ALBUTEROL SULFATE 3 ML: .5; 3 SOLUTION RESPIRATORY (INHALATION) at 20:01

## 2017-03-19 RX ADMIN — CHLORTHALIDONE 25 MG: 25 TABLET ORAL at 08:40

## 2017-03-19 RX ADMIN — FLUTICASONE FUROATE AND VILANTEROL TRIFENATATE 1 PUFF: 100; 25 POWDER RESPIRATORY (INHALATION) at 08:40

## 2017-03-19 RX ADMIN — Medication 2 SPRAY: at 12:59

## 2017-03-19 RX ADMIN — Medication 2 SPRAY: at 22:07

## 2017-03-19 NOTE — PROGRESS NOTES
0- Received pt from Omaira Rosales RN. Pt in bed, resting quietly. Pt repositioned at this time. No needs voiced at this time. Call bell and phone within reach. 0845- Pt physical assessment and morning meds passed at this time. [address] with Brooke in [address]. Pt is able to come down at this time. 1004- Pt transferred of this floor to complete CT.    1037- Pt returned from CT.

## 2017-03-19 NOTE — PROGRESS NOTES
conducted an initial consultation and Spiritual Assessment for Kevin Alarcon, who is a 66 y. o.,male. Patients Primary Language is: [place]. According to the patients EMR Jainism Affiliation is: Mary Galindo. The reason the Patient came to the hospital is:   Patient Active Problem List    Diagnosis Date Noted    COPD (chronic obstructive pulmonary disease) ([address] 10/02/2014    URIEL (acute kidney injury) ([facility] 10/02/2014    Hypertension         The  provided the following Interventions:  Initiated a relationship of care and support. Explored issues of irais, belief, spirituality and Hindu/ritual needs while hospitalized. Listened empathically. Provided chaplaincy education. Provided information about Spiritual Care Services. Offered prayer and assurance of continued prayers on patient's behalf. Chart reviewed. The following outcomes were achieved:  Patient shared limited information about both their medical narrative and spiritual journey/beliefs. Patient processed feeling about current hospitalization. Patient expressed gratitude for the 's visit. Assessment:  Patient does not have any Hindu/cultural needs that will affect patients preferences in health care. Plan:  Chaplains will continue to follow and will provide pastoral care on an as needed/requested basis.  recommends bedside caregivers page  on duty if patient shows signs of acute spiritual or emotional distress.     Chaplain Gabriella Jesnen

## 2017-03-19 NOTE — PROGRESS NOTES
2029 Pt arrived to room 311. Short of breath, but pt states that he feels better after treatment in ED. Hard of hearing. 0204 Pt awoke to void. Short of breath and coughing. Congested-sounding. Called respiratory for albuterol tx. SHIFT SUMMARY: No events. Pt continues to have wheezing and cough, but was still able to sleep. Pt states he feels better. Normal sinus rhythm with PVCs on telemetry box 44. O2 saturation >97% on 2L nasal cannula. Informed Dr. Cristi Meza of respiratory status.

## 2017-03-19 NOTE — INTERDISCIPLINARY ROUNDS
Interdisciplinary Round Note   Patient Information: Florencia Sandoval                                      704/50 Reason for Admission: copd exacerbation  copd exacerbation  Quality Measure: PNA and COPD            Attending Provider:   Shari Jones DO  Primary Care Physician:       Lucy Harkins MD       [phone]  Consulting:  IP CONSULT TO HOSPITALIST  IDR Rounding Physician:  Past Medical History:   Past Medical History:   Diagnosis Date    Cancer [facility])     prostate    COPD (chronic obstructive pulmonary disease) ([address]     Hypertension     Pneumonia     Radiation effect         Hospital day: 0                          - - -  Estimated discharge date:   RRAT Score: Medium Risk            18       Total Score        3 Relationship with PCP    2 Patient Living Status    4 More than 1 Admission in calendar year    5 Patient Insurance is Medicare, Medicaid or Self Pay    4 Charlson Comorbidity Score        Criteria that do not apply:    Patient Length of Stay > 5         Primary Goals for Today: Abx, steroids, neb tx    Secondary Goals for Today: Glucose control, rest    Overnight Events: no    Humphreys: no    Central Line: no    Isolation: no. Last radiation tx 9 days PTA per pt. IV Antibiotics?  levaquin       When started: 3/18/17  Current Medication List:          Current Facility-Administered Medications   Medication Dose Route Frequency    albuterol-ipratropium (DUO-NEB) 2.5 MG-0.5 MG/3 ML  3 mL Nebulization QID RT    sodium chloride (OCEAN) 0.65 % nasal spray 2 Spray  2 Spray Both Nostrils QID    methylPREDNISolone (PF) (SOLU-MEDROL) injection 40 mg  40 mg IntraVENous Q8H    albuterol (PROVENTIL VENTOLIN) nebulizer solution 2.5 mg  2.5 mg Nebulization Q2H PRN    acetaminophen (TYLENOL) tablet 650 mg  650 mg Oral Q4H PRN    heparin (porcine) injection 5,000 Units  5,000 Units SubCUTAneous Q8H    insulin lispro (HUMALOG) injection   SubCUTAneous AC&HS    glucose chewable tablet 16 g  4 Tab Oral PRN    glucagon (GLUCAGEN) injection 1 mg  1 mg IntraMUSCular PRN    dextrose (D50W) injection syrg 12.5-25 g  25-50 mL IntraVENous PRN    levoFLOXacin (LEVAQUIN) 500 mg in D5W IVPB  500 mg IntraVENous Q24H    0.9% sodium chloride infusion  75 mL/hr IntraVENous CONTINUOUS    [START ON 3/19/2017] fluticasone-vilanterol (BREO ELLIPTA) 100mcg-25mcg/puff  1 Puff Inhalation DAILY    [START ON 3/19/2017] chlorthalidone (HYGROTEN) tablet 25 mg  25 mg Oral DAILY    [START ON 3/19/2017] doxazosin (CARDURA) tablet 2 mg  2 mg Oral DAILY    [START ON 3/19/2017] lisinopril (PRINIVIL, ZESTRIL) tablet 40 mg  40 mg Oral DAILY      VTE Prophylaxis                                 Lines, Drains, & Airways  Peripheral IV 03/18/17 Right Wrist-Site Assessment: Clean, dry, & intact     Vital signs:  Visit Vitals    /57 (BP 1 Location: Left arm, BP Patient Position: At rest)    Pulse 90    Temp 98.4 °F (36.9 °C)    Resp 24    Ht 5' 9\" (1.753 m)    Wt 89.1 kg (196 lb 8 oz)    SpO2 98%    BMI 29.02 kg/m2        Intake and Output:      03/18 1901 - 03/19 0700  In: 197.5 [I.V.:197.5]  Out: 475 [Urine:475]  Last Bowel Movement Date: 03/17/17  Stool Color: [facility]  Stool Appearance: Formed, Soft  Stool Amount: Medium  Stool Source/Status: Rectum     Current Diet: DIET CARDIAC Regular       Abdominal   Abdominal Assessment: Soft, Obese  Appetite: Fair  Bowel Sounds:  Active   Nutrition  Chewing/Swallowing Problems: No  Difficulty with Secretions: No  Speech Slurred/Thick/Garbled: No    NGT: no    Feeding Tube: no   Recent Glucose Results:   Lab Results   Component Value Date/Time     (H) 03/18/2017 04:30 PM    GLUCPOC 200 (H) 03/18/2017 09:05 PM    GI Prophylaxis: no        Type: ***        Activity Level:       Needs assistance with ADLs: yes  PT Consult Status: Pending  Equipment: walker  Surgical/Ortho Notes: ***   Current Immunizations:  Immunization History   Administered Date(s) Administered   [address] [place] [address] Influenza Vaccine 01/15/2017    Influenza Vaccine PF 10/06/2014    Pneumococcal Vaccine (Unspecified Type) 10/01/2012, 10/01/2012     RRAT Score:     Readmit Risk Tool  Support Systems: Spouse/Significant Other/Partner  Relationship with Primary Physician Group: Seen at least one time within the past 6 months   Recommendations:       Discharge Disposition: TBD, pending progress    Needs for Discharge: ***           Recommendations from IDR team: ***    Other Notes: Hard of Hearing

## 2017-03-19 NOTE — ROUTINE PROCESS
TRANSFER - IN REPORT:    Verbal report received from [facility] RN(name) on Danni Mckenna  being received from [facility](unit) for routine progression of care      Report consisted of patients Situation, Background, Assessment and   Recommendations(SBAR). Information from the following report(s) SBAR, Kardex, ED Summary, Intake/Output, MAR, Recent Results and Cardiac Rhythm SR was reviewed with the receiving nurse. Opportunity for questions and clarification was provided. Assessment completed upon patients arrival to unit and care assumed.

## 2017-03-19 NOTE — ROUTINE PROCESS
TRANSFER - OUT REPORT:    Verbal report given to Maxwell Barbosa RN (name) on Juanice Rubinstein  being transferred to Telemetry (unit) for routine progression of care       Report consisted of patients Situation, Background, Assessment and   Recommendations(SBAR). Information from the following report(s) SBAR, Kardex, Intake/Output, MAR and Recent Results was reviewed with the receiving nurse. Lines:   Peripheral IV 03/18/17 Right Wrist (Active)   Site Assessment Clean, dry, & intact 3/19/2017  8:45 AM   Phlebitis Assessment 0 3/18/2017  4:30 PM   Infiltration Assessment 0 3/18/2017  4:30 PM   Dressing Status Clean, dry, & intact 3/19/2017  8:45 AM   Dressing Type Tape;Transparent 3/19/2017  8:45 AM   Hub Color/Line Status Green; Infusing;Patent; Flushed 3/19/2017  8:45 AM   Action Taken Blood drawn 3/18/2017  4:30 PM   Alcohol Cap Used Yes 3/19/2017  8:45 AM        Opportunity for questions and clarification was provided.       Patient transported with:   Registered Nurse

## 2017-03-19 NOTE — PROGRESS NOTES
1600 Pt arrived on unit via wheelchair, pt is alert and oriented x4, SR on the monitor, scattered wheezing auscultated, pt is on 2L nc, denies pain, VSS. Pt is sitting up in recliner watching television, pt denies any further needs at this time. Shift Summary- Pt experienced an uneventful shift, remained in NSR throughout shift.

## 2017-03-19 NOTE — PROGRESS NOTES
Problem: Mobility Impaired (Adult and Pediatric)  Goal: *Acute Goals and Plan of Care (Insert Text)  Physical Therapy Goals STG/LTG  Initiated 3/19/2017 and to be accomplished within 7 day(s)  1. Patient will move from supine to sit and sit to supine in bed with modified independence. 2. Patient will transfer from bed to chair and chair to bed with modified independence using the least restrictive device. 3. Patient will perform sit to stand with modified independence. 4. Patient will ambulate with modified independence for >150 feet with the least restrictive device. PHYSICAL THERAPY EVALUATION     Patient: Salvatore Larios (15 y.o. male)  Date: 3/19/2017  Primary Diagnosis: copd exacerbation  copd exacerbation  COPD (chronic obstructive pulmonary disease) ([facility])        Precautions:   Fall      ASSESSMENT :  Based on the objective data described below, the patient presents with decreased functional mobility and independence in regard to bed mobility, transfers, gt quality and tolerance, generalized dec strength, and safety. Pt denies pain however c/o nose feeling stuffy and pressure in sinuses. Pt able to participate in transfers CGA/min A. Pt able to participate in gt training w/ 2.5L O2 via NC, GB and CGA w/ fatigue noted. Pt limited in gt distance due to respiratory status. Pt returned to sitting up in chair w/ all needs within reach per request.   Nurse Suman aware. [facility] hospital d/c. Patient will benefit from skilled intervention to address the above impairments.   Patients rehabilitation potential is considered to be Good  Factors which may influence rehabilitation potential include:   [ ]         None noted  [ ]         Mental ability/status  [X]         Medical condition  [ ]         Home/family situation and support systems  [ ]         Safety awareness  [ ]         Pain tolerance/management  [ ]         Other:        PLAN :  Recommendations and Planned Interventions:  [X] Bed Mobility Training             [ ]    Neuromuscular Re-Education  [X]           Transfer Training                   [ ]    Orthotic/Prosthetic Training  [X]           Gait Training                          [ ]    Modalities  [X]           Therapeutic Exercises          [ ]    Edema Management/Control  [X]           Therapeutic Activities            [X]    Patient and Family Training/Education  [ ]           Other (comment):     Frequency/Duration: Patient will be followed by physical therapy daily to address goals. Discharge Recommendations: Home Health  Further Equipment Recommendations for Discharge: N/A       SUBJECTIVE:   Patient stated I would like to get out of this bed for awhile.       OBJECTIVE DATA SUMMARY:       Past Medical History:   Diagnosis Date    Cancer ([address]       prostate    COPD [facility]chronic obstructive pulmonary disease) ([address]      Hypertension      Pneumonia      Radiation effect       Past Surgical History:   Procedure Laterality Date    HX HERNIA REPAIR         Barriers to Learning/Limitations: None  Compensate with: visual, verbal, tactile, kinesthetic cues/model  Prior Level of Function/Home Situation:   Home Situation  Home Environment: Trailer/mobile home  # Steps to Enter: 5  Rails to Enter: Yes  Hand Rails : Bilateral  One/Two Story Residence: One story  Living Alone: No  Support Systems: Spouse/Significant Other/Partner  Patient Expects to be Discharged to[de-identified] Trailer/mobile home  Current DME Used/Available at Home: None  Critical Behavior:  Neurologic State: Alert; Appropriate for age  Orientation Level: Oriented X4  Cognition: Appropriate decision making; Appropriate for age attention/concentration; Appropriate safety awareness; Follows commands  Safety/Judgement: Awareness of environment  Psychosocial  Patient Behaviors: Calm; Cooperative  Purposeful Interaction: Yes  Pt Identified Daily Priority: Clinical issues (comment)  Caritas Process: Nurture loving kindness; Teaching/learning;Enable irais/hope;Establish trust;Attend basic human needs;Create healing environment  Caring Interventions: Reassure; Therapeutic modalities  Reassure: Therapeutic listening; Informing;Caring rounds  Therapeutic Modalities: Humor; Intentional therapeutic touch  Strength:    Strength: Generally decreased, functional  Tone & Sensation:   Tone: Normal  Sensation: Intact  Range Of Motion:  AROM: Generally decreased, functional  Functional Mobility:  Bed Mobility:  Supine to Sit: Contact guard assistance (vc)  Scooting: Contact guard assistance (vc)  Transfers:  Sit to Stand: Minimum assistance (vc)  Stand to Sit: Contact guard assistance (vc)  Balance:   Sitting: Intact  Standing: Impaired  Standing - Static: Fair  Standing - Dynamic : Fair  Ambulation/Gait Training:  Distance (ft): 36 Feet (ft)  Assistive Device: Gait belt (2.5 L O2 via NC)  Ambulation - Level of Assistance: Contact guard assistance (vc)  Gait Abnormalities: Decreased step clearance  Base of Support: Widened  Speed/Lisbet: Slow  Step Length: Left shortened;Right shortened  Interventions: Safety awareness training;Verbal cues  Therapeutic Exercises:   Pain:  Pain Scale 1: Numeric (0 - 10)  Pain Intensity 1: 0  Activity Tolerance:   Fair   Please refer to the flowsheet for vital signs taken during this treatment. After treatment:   [X]         Patient left in no apparent distress sitting up in chair  [ ]         Patient left in no apparent distress in bed  [X]         Call bell left within reach  [X]         Nursing notified  [ ]         Caregiver present  [ ]         Bed alarm activated      COMMUNICATION/EDUCATION:   [X]         Fall prevention education was provided and the patient/caregiver indicated understanding. [X]         Patient/family have participated as able in goal setting and plan of care. [X]         Patient/family agree to work toward stated goals and plan of care.   [ ]         Patient understands intent and goals of therapy, but is neutral about his/her participation. [ ]         Patient is unable to participate in goal setting and plan of care. Thank you for this referral.  Anitha Hamlin, PT   Time Calculation: 10 mins  Eval Complexity: History: MEDIUM  Complexity : 1-2 comorbidities / personal factors will impact the outcome/ POC Exam:LOW Complexity : 1-2 Standardized tests and measures addressing body structure, function, activity limitation and / or participation in recreation  Presentation: LOW Complexity : Stable, uncomplicated  Clinical Decision Making:Low Complexity amb >30'  Overall Complexity:LOW     Mobility  Current  CJ= 20-39%   Goal  CI= 1-19%. The severity rating is based on the Level of Assistance required for Functional Mobility and ADLs.

## 2017-03-19 NOTE — ROUTINE PROCESS
Bedside and Verbal shift change report given to CHRIS Altamirano (oncoming nurse) by Malika Coy RN  (offgoing nurse). Report included the following information SBAR, Kardex, Intake/Output and MAR.

## 2017-03-19 NOTE — PROGRESS NOTES
Hospitalist Progress Note    Patient: Fidel Asif MRN: [id number]  CSN: [id number]    YOB: 1945  Age: 66 y.o. Sex: male    DOA: 3/18/2017 LOS:  LOS: 0 days            Assessment/Plan     1. COPD exacerbation still wheezing  2. Sinusitis  3. Renal insufficiency  4. Scarring R lung, chronic  5 presbyacusis     Plan:  -Continue steoirds, increase frequency  -scheduled duonebs, albuterol  -IV antibiotics  - hold ACE given renal funciton, check post void residual  - CT chest and consult pulmonology      Patient Active Problem List   Diagnosis Code    COPD (chronic obstructive pulmonary disease) ([facility] J44.9    URIEL (acute kidney injury) ([facility] N17.9    Hypertension I10               Subjective:    cc: \" Im still wheezing\"  Dyspnea and wheezing persists  No chest pain, palpitations  Co nasal congestion    REVIEW OF SYSTEMS:  General: No fevers or chills. Cardiovascular: No chest pain or pressure. No palpitations. Pulmonary: + shortness of breath. Gastrointestinal: No nausea, vomiting. Objective:        Vital signs/Intake and Output:  Visit Vitals    /60 (BP 1 Location: Left arm, BP Patient Position: At rest)    Pulse 93    Temp 97.9 °F (36.6 °C)    Resp 23    Ht 5' 9\" (1.753 m)    Wt 89.1 kg (196 lb 8 oz)    SpO2 97%    BMI 29.02 kg/m2     Current Shift:  03/19 0701 - 03/19 1900  In: -   Out: 275 [Urine:275]  Last three shifts:  03/17 1901 - 03/19 0700  In: 911.5 [I.V.:911.5]  Out: 1025 [Urine:1025]    Body mass index is 29.02 kg/(m^2).     Physical Exam:  GEN: Alert and oriented times three NAD  EYES: conjunctiva normal, lids with out lesions  HEENT: MMM, No thyromegaly, no lymphadenopathy  HEART: RRR +S1 +S2, no JVD, pulses 2+ distally  LUNGS: + bilat expiratory wheezes, no rales or rhonchi  ABDOMEN: + BS, soft NT/ND no organomegaly,  No rebound  EXTREMITIES: No edema cyanosis, cap refill normal   SKIN: no rashes or skin breakdown, no nodules, normal lars  Current Facility-Administered Medications   Medication Dose Route Frequency    sodium chloride (OCEAN) 0.65 % nasal spray 2 Spray  2 Spray Both Nostrils Q2H PRN    budesonide (PULMICORT) 500 mcg/2 ml nebulizer suspension  500 mcg Nebulization BID RT    albuterol-ipratropium (DUO-NEB) 2.5 MG-0.5 MG/3 ML  3 mL Nebulization QID RT    sodium chloride (OCEAN) 0.65 % nasal spray 2 Spray  2 Spray Both Nostrils QID    methylPREDNISolone (PF) (SOLU-MEDROL) injection 40 mg  40 mg IntraVENous Q8H    albuterol (PROVENTIL VENTOLIN) nebulizer solution 2.5 mg  2.5 mg Nebulization Q2H PRN    acetaminophen (TYLENOL) tablet 650 mg  650 mg Oral Q4H PRN    heparin (porcine) injection 5,000 Units  5,000 Units SubCUTAneous Q8H    insulin lispro (HUMALOG) injection   SubCUTAneous AC&HS    glucose chewable tablet 16 g  4 Tab Oral PRN    glucagon (GLUCAGEN) injection 1 mg  1 mg IntraMUSCular PRN    dextrose (D50W) injection syrg 12.5-25 g  25-50 mL IntraVENous PRN    levoFLOXacin (LEVAQUIN) 500 mg in D5W IVPB  500 mg IntraVENous Q24H    0.9% sodium chloride infusion  75 mL/hr IntraVENous CONTINUOUS    fluticasone-vilanterol (BREO ELLIPTA) 100mcg-25mcg/puff  1 Puff Inhalation DAILY    chlorthalidone (HYGROTEN) tablet 25 mg  25 mg Oral DAILY    doxazosin (CARDURA) tablet 2 mg  2 mg Oral DAILY    lisinopril (PRINIVIL, ZESTRIL) tablet 40 mg  40 mg Oral DAILY         All the patient's labs over the past 24 hours were reviewed both during my initial daily workflow process and at the time notated as \"note time\" in [facility]. (It is not time stamped separately in this workflow.)  Select labs are listed below.         Labs: Results:       Chemistry Recent Labs      03/19/17   0113  03/18/17   1630   GLU  129*  108*   NA  140  138   K  4.5  3.9   CL  102  102   CO2  26  22   BUN  26*  24*   CREA  1.84*  1.67*   CA  9.0  8.9   AGAP  12  14   BUCR  14  14   AP   --   67   TP   --   6.9   ALB   --   3.2*   GLOB   --   3.7 AGRAT   --   0.9      CBC w/Diff Recent Labs      03/19/17   0113  03/18/17   1630   WBC  7.8  8.3   RBC  4.02*  4.01*   HGB  12.3*  12.3*   HCT  36.6  36.1   PLT  239  221   GRANS  95*  68   LYMPH  2*  7*   EOS  0  8*      Cardiac Enzymes Recent Labs      03/18/17   1630   CPK  294   CKND1  2.4                  Liver Enzymes Recent Labs      03/18/17   1630   TP  6.9   ALB  3.2*   AP  67   SGOT  16          Procedures/imaging: see electronic medical records for all procedures/Xrays and details which were not copied into this note but were reviewed prior to creation of [facility],   Internal Medicine/Geriatrics

## 2017-03-19 NOTE — ROUTINE PROCESS
Bedside and Verbal shift change report given to PAUL Epperson (oncoming nurse) by Danni Carias   (offgoing nurse).  Report included the following information SBAR, Kardex, ED Summary, Intake/Output, MAR, Recent Results and Cardiac Rhythm SR.

## 2017-03-20 LAB
ANION GAP BLD CALC-SCNC: 13 MMOL/L (ref 3–18)
ARTERIAL PATENCY WRIST A: YES
BASE DEFICIT BLD-SCNC: 4 MMOL/L
BASOPHILS # BLD AUTO: 0 K/UL (ref 0–0.06)
BASOPHILS # BLD: 0 % (ref 0–2)
BDY SITE: ABNORMAL
BODY TEMPERATURE: 98.1
BUN SERPL-MCNC: 35 MG/DL (ref 7–18)
BUN/CREAT SERPL: 23 (ref 12–20)
CALCIUM SERPL-MCNC: 8.5 MG/DL (ref 8.5–10.1)
CHLORIDE SERPL-SCNC: 102 MMOL/L (ref 100–108)
CO2 SERPL-SCNC: 22 MMOL/L (ref 21–32)
CREAT SERPL-MCNC: 1.51 MG/DL (ref 0.6–1.3)
DIFFERENTIAL METHOD BLD: ABNORMAL
EOSINOPHIL # BLD: 0 K/UL (ref 0–0.4)
EOSINOPHIL NFR BLD: 0 % (ref 0–5)
ERYTHROCYTE [DISTWIDTH] IN BLOOD BY AUTOMATED COUNT: 13.8 % (ref 11.6–14.5)
GAS FLOW.O2 O2 DELIVERY SYS: ABNORMAL L/MIN
GAS FLOW.O2 SETTING OXYMISER: 2 L/M
GLUCOSE BLD STRIP.AUTO-MCNC: 129 MG/DL (ref 70–110)
GLUCOSE BLD STRIP.AUTO-MCNC: 144 MG/DL (ref 70–110)
GLUCOSE BLD STRIP.AUTO-MCNC: 155 MG/DL (ref 70–110)
GLUCOSE BLD STRIP.AUTO-MCNC: 187 MG/DL (ref 70–110)
GLUCOSE SERPL-MCNC: 136 MG/DL (ref 74–99)
HCO3 BLD-SCNC: 20.8 MMOL/L (ref 22–26)
HCT VFR BLD AUTO: 34.7 % (ref 36–48)
HGB BLD-MCNC: 11.8 G/DL (ref 13–16)
LYMPHOCYTES # BLD AUTO: 3 % (ref 21–52)
LYMPHOCYTES # BLD: 0.3 K/UL (ref 0.9–3.6)
MCH RBC QN AUTO: 31 PG (ref 24–34)
MCHC RBC AUTO-ENTMCNC: 34 G/DL (ref 31–37)
MCV RBC AUTO: 91.1 FL (ref 74–97)
MONOCYTES # BLD: 0.3 K/UL (ref 0.05–1.2)
MONOCYTES NFR BLD AUTO: 4 % (ref 3–10)
NEUTS SEG # BLD: 7.7 K/UL (ref 1.8–8)
NEUTS SEG NFR BLD AUTO: 93 % (ref 40–73)
PCO2 BLD: 35 MMHG (ref 35–45)
PH BLD: 7.38 [PH] (ref 7.35–7.45)
PLATELET # BLD AUTO: 262 K/UL (ref 135–420)
PMV BLD AUTO: 9.4 FL (ref 9.2–11.8)
PO2 BLD: 70 MMHG (ref 80–100)
POTASSIUM SERPL-SCNC: 4.1 MMOL/L (ref 3.5–5.5)
RBC # BLD AUTO: 3.81 M/UL (ref 4.7–5.5)
SAO2 % BLD: 94 % (ref 92–97)
SERVICE CMNT-IMP: ABNORMAL
SODIUM SERPL-SCNC: 137 MMOL/L (ref 136–145)
SPECIMEN TYPE: ABNORMAL
TOTAL RESP. RATE, ITRR: 24
WBC # BLD AUTO: 8.3 K/UL (ref 4.6–13.2)

## 2017-03-20 PROCEDURE — 82803 BLOOD GASES ANY COMBINATION: CPT

## 2017-03-20 PROCEDURE — 94660 CPAP INITIATION&MGMT: CPT

## 2017-03-20 PROCEDURE — 74011000250 HC RX REV CODE- 250: Performed by: INTERNAL MEDICINE

## 2017-03-20 PROCEDURE — 74011250636 HC RX REV CODE- 250/636: Performed by: FAMILY MEDICINE

## 2017-03-20 PROCEDURE — 94760 N-INVAS EAR/PLS OXIMETRY 1: CPT

## 2017-03-20 PROCEDURE — 36600 WITHDRAWAL OF ARTERIAL BLOOD: CPT

## 2017-03-20 PROCEDURE — 74011250637 HC RX REV CODE- 250/637: Performed by: INTERNAL MEDICINE

## 2017-03-20 PROCEDURE — 74011250636 HC RX REV CODE- 250/636

## 2017-03-20 PROCEDURE — 94640 AIRWAY INHALATION TREATMENT: CPT

## 2017-03-20 PROCEDURE — 85025 COMPLETE CBC W/AUTO DIFF WBC: CPT | Performed by: INTERNAL MEDICINE

## 2017-03-20 PROCEDURE — 74011250636 HC RX REV CODE- 250/636: Performed by: INTERNAL MEDICINE

## 2017-03-20 PROCEDURE — 80048 BASIC METABOLIC PNL TOTAL CA: CPT | Performed by: INTERNAL MEDICINE

## 2017-03-20 PROCEDURE — 97530 THERAPEUTIC ACTIVITIES: CPT

## 2017-03-20 PROCEDURE — 77010033678 HC OXYGEN DAILY

## 2017-03-20 PROCEDURE — 82962 GLUCOSE BLOOD TEST: CPT

## 2017-03-20 PROCEDURE — 97165 OT EVAL LOW COMPLEX 30 MIN: CPT

## 2017-03-20 PROCEDURE — 65660000000 HC RM CCU STEPDOWN

## 2017-03-20 RX ORDER — LORAZEPAM 2 MG/ML
1 INJECTION INTRAMUSCULAR
Status: DISCONTINUED | OUTPATIENT
Start: 2017-03-20 | End: 2017-03-24 | Stop reason: HOSPADM

## 2017-03-20 RX ORDER — LORAZEPAM 2 MG/ML
INJECTION INTRAMUSCULAR
Status: COMPLETED
Start: 2017-03-20 | End: 2017-03-20

## 2017-03-20 RX ORDER — IBUPROFEN 200 MG
1 TABLET ORAL DAILY
Status: DISCONTINUED | OUTPATIENT
Start: 2017-03-21 | End: 2017-03-24 | Stop reason: HOSPADM

## 2017-03-20 RX ORDER — FLUTICASONE PROPIONATE 50 MCG
2 SPRAY, SUSPENSION (ML) NASAL
Status: DISCONTINUED | OUTPATIENT
Start: 2017-03-20 | End: 2017-03-24 | Stop reason: HOSPADM

## 2017-03-20 RX ADMIN — ALBUTEROL SULFATE 2.5 MG: 2.5 SOLUTION RESPIRATORY (INHALATION) at 00:03

## 2017-03-20 RX ADMIN — HEPARIN SODIUM 5000 UNITS: 5000 INJECTION, SOLUTION INTRAVENOUS; SUBCUTANEOUS at 03:31

## 2017-03-20 RX ADMIN — Medication 2 SPRAY: at 18:00

## 2017-03-20 RX ADMIN — ALBUTEROL SULFATE 2.5 MG: 2.5 SOLUTION RESPIRATORY (INHALATION) at 01:56

## 2017-03-20 RX ADMIN — IPRATROPIUM BROMIDE AND ALBUTEROL SULFATE 3 ML: .5; 3 SOLUTION RESPIRATORY (INHALATION) at 12:46

## 2017-03-20 RX ADMIN — BUDESONIDE 500 MCG: 0.5 INHALANT RESPIRATORY (INHALATION) at 20:09

## 2017-03-20 RX ADMIN — METHYLPREDNISOLONE SODIUM SUCCINATE 60 MG: 125 INJECTION, POWDER, FOR SOLUTION INTRAMUSCULAR; INTRAVENOUS at 12:10

## 2017-03-20 RX ADMIN — FLUTICASONE FUROATE AND VILANTEROL TRIFENATATE 1 PUFF: 100; 25 POWDER RESPIRATORY (INHALATION) at 09:10

## 2017-03-20 RX ADMIN — INSULIN LISPRO 2 UNITS: 100 INJECTION, SOLUTION INTRAVENOUS; SUBCUTANEOUS at 22:31

## 2017-03-20 RX ADMIN — METHYLPREDNISOLONE SODIUM SUCCINATE 60 MG: 125 INJECTION, POWDER, FOR SOLUTION INTRAMUSCULAR; INTRAVENOUS at 23:29

## 2017-03-20 RX ADMIN — Medication 2 SPRAY: at 21:37

## 2017-03-20 RX ADMIN — BUDESONIDE 500 MCG: 0.5 INHALANT RESPIRATORY (INHALATION) at 08:09

## 2017-03-20 RX ADMIN — METHYLPREDNISOLONE SODIUM SUCCINATE 60 MG: 125 INJECTION, POWDER, FOR SOLUTION INTRAMUSCULAR; INTRAVENOUS at 05:20

## 2017-03-20 RX ADMIN — HEPARIN SODIUM 5000 UNITS: 5000 INJECTION, SOLUTION INTRAVENOUS; SUBCUTANEOUS at 18:17

## 2017-03-20 RX ADMIN — IPRATROPIUM BROMIDE AND ALBUTEROL SULFATE 3 ML: .5; 3 SOLUTION RESPIRATORY (INHALATION) at 08:09

## 2017-03-20 RX ADMIN — LEVOFLOXACIN 500 MG: 5 INJECTION, SOLUTION INTRAVENOUS at 18:18

## 2017-03-20 RX ADMIN — LORAZEPAM 1 MG: 2 INJECTION INTRAMUSCULAR; INTRAVENOUS at 03:27

## 2017-03-20 RX ADMIN — INSULIN LISPRO 2 UNITS: 100 INJECTION, SOLUTION INTRAVENOUS; SUBCUTANEOUS at 12:05

## 2017-03-20 RX ADMIN — CHLORTHALIDONE 25 MG: 25 TABLET ORAL at 09:06

## 2017-03-20 RX ADMIN — Medication 2 SPRAY: at 09:07

## 2017-03-20 RX ADMIN — ALBUTEROL SULFATE 5 MG: 2.5 SOLUTION RESPIRATORY (INHALATION) at 02:35

## 2017-03-20 RX ADMIN — IPRATROPIUM BROMIDE AND ALBUTEROL SULFATE 3 ML: .5; 3 SOLUTION RESPIRATORY (INHALATION) at 20:09

## 2017-03-20 RX ADMIN — DOXAZOSIN 2 MG: 1 TABLET ORAL at 09:06

## 2017-03-20 RX ADMIN — IPRATROPIUM BROMIDE AND ALBUTEROL SULFATE 3 ML: .5; 3 SOLUTION RESPIRATORY (INHALATION) at 16:32

## 2017-03-20 RX ADMIN — METHYLPREDNISOLONE SODIUM SUCCINATE 60 MG: 125 INJECTION, POWDER, FOR SOLUTION INTRAMUSCULAR; INTRAVENOUS at 18:18

## 2017-03-20 RX ADMIN — LORAZEPAM 1 MG: 2 INJECTION INTRAMUSCULAR at 03:27

## 2017-03-20 RX ADMIN — HEPARIN SODIUM 5000 UNITS: 5000 INJECTION, SOLUTION INTRAVENOUS; SUBCUTANEOUS at 12:06

## 2017-03-20 RX ADMIN — Medication 2 SPRAY: at 12:11

## 2017-03-20 NOTE — PROGRESS NOTES
1950: Assumed care of patient resting in bed, bed in lowest position, call bell with in reach. Patient demonstrated and verbalized on how to call for assistance. Alarm parameters reviewed, on, and audible. 0004: Patient experiencing dyspnea at rest and expiratory wheezing, respirations 22, chest rising and falling with symmetrical expansion. Albuterol nebulizer sol 2.5mg administered    0030: Patient no loner experiencing dyspnea at rest, expiratory wheezing continues to be present respirations 20. Chest rising and falling with symmetrical expansion. Nebulizer moderately effective. 0145: Patient complains of SOB, patient states \"I can not sleep, I have not gotten any sleep in the past three days\". Increase in respiratory effort noted. 0155: Dr. Vu Agustin paged     0200: Dr. Vu Agustin returned call and informed of patient complaints of SOB while at rest and unable to sleep. No new orders, instructed to notify respiratory. 0225: RT talked with Dr. Vu Agustin will increase Neb dosage to 5 mcg ABG's to be completed if no improvement is noted with ramses  [address] [place] [address]. Solumedrol also increased. 4280: Dr. Vu Agustin paged     6256: Dr. Vu Agustin returned page, new order for Ativan 1mg every 4 hours as needed for anxiety and BIPAP as needed    0330: Ativan 1mg administered via IV, respirations as well as respiratory effort noticeably decrease. Patient states \" I feel a lot better than I did a little while ago\". 0430: Patient tolerating BIPAP well no further anxiety noted at this time, medication effective     0503: Patient continues to tolerate BIPAP, no signs or symptoms of respiratory distress noted at this time     0650: Patient continues to tolerate BIPAP. Expiratory wheezes heard during auscultation,no signs or symptoms of respiratory distress noted at this time. 0715:Shift summary: Patient experienced periods of SOB while at rest and expiratory wheezing, Dr Shaq Kelley and RT notified.  ABG's were drawn, Ativan 1mg administered via IV, Solumedrol dose increased, and patient was placed on BIPAP. Patient tolerated BIPAP well thorough out the night. Left resting in bed no signs/symptoms of respiratory distress, bed in lowest position, call light with in reach.

## 2017-03-20 NOTE — PROGRESS NOTES
Problem: Mobility Impaired (Adult and Pediatric)  Goal: *Acute Goals and Plan of Care (Insert Text)  Physical Therapy Goals STG/LTG  Initiated 3/19/2017 and to be accomplished within 7 day(s)  1. Patient will move from supine to sit and sit to supine in bed with modified independence. 2. Patient will transfer from bed to chair and chair to bed with modified independence using the least restrictive device. 3. Patient will perform sit to stand with modified independence. 4. Patient will ambulate with modified independence for >150 feet with the least restrictive device. Outcome: Progressing Towards Goal  PHYSICAL THERAPY TREATMENT     Patient: Rebecca Cruz (5 y.o. male)  Date: 3/20/2017  Diagnosis: copd exacerbation  copd exacerbation  COPD (chronic obstructive pulmonary disease) (H[facility]) <principal problem not specified>  Precautions: Fall   Chart, physical therapy assessment, plan of care and goals were reviewed. ASSESSMENT:  Pt continues to be motivated to participate with PT at this time. Pt with KUMAR during any mobility; on 2L via NC pt's SpO2 was between 92-96%. During gait training pt ambulated a total of 5 feet from bed-chair with Crystal demonstrating a wide ALEXIA. Left pt in recliner with all needs met and LEs elevated. Bertis Primrose, RN informed of pt progress with PT. Recommend SNF vs. HHPT at time of discharge depending on pt progress. Progression toward goals:  [ ]      Improving appropriately and progressing toward goals  [X]      Improving slowly and progressing toward goals  [ ]      Not making progress toward goals and plan of care will be adjusted       PLAN:  Patient continues to benefit from skilled intervention to address the above impairments. Continue treatment per established plan of care. Discharge Recommendations:  [address] Recommendations for Discharge:  TBD       SUBJECTIVE:   Patient stated Last night was a little rough.  OBJECTIVE DATA SUMMARY:   Critical Behavior:  Neurologic State: Alert, Appropriate for age  Orientation Level: Appropriate for age, Oriented X4  Cognition: Appropriate decision making, Appropriate for age attention/concentration, Appropriate safety awareness, Follows commands  Safety/Judgement: Awareness of environment, Fall prevention, Good awareness of safety precautions, Insight into deficits  Functional Mobility Training:  Bed Mobility:   Supine to Sit: Contact guard assistance   Transfers:  Sit to Stand: Minimum assistance  Stand to Sit: Contact guard assistance   Balance:  Sitting: Intact  Standing: Impaired  Standing - Static: Good  Standing - Dynamic : Fair  Ambulation/Gait Training:  Distance (ft): 3 Feet (ft)  Assistive Device: Gait belt  Ambulation - Level of Assistance: Contact guard assistance; Additional time   Gait Abnormalities: Decreased step clearance   Base of Support: Widened   Speed/Lisbet: Slow  Step Length: Right shortened;Left shortened   Interventions: Visual/Demos; Verbal cues; Tactile cues; Safety awareness training   Therapeutic Exercises:   HEP included ankle pumps, heel slides, marching x 5 reps  Pain:  Pain Scale 1: Numeric (0 - 10)  Pain Intensity 1: 0   Activity Tolerance:   Fair  Please refer to the flowsheet for vital signs taken during this treatment.   After treatment:   [X] Patient left in no apparent distress sitting up in chair  [ ] Patient left in no apparent distress in bed  [X] Call bell left within reach  [X] Nursing notified  [ ] Caregiver present  [ ] Bed alarm activated     Alondra Yoder PT, DPT      Time Calculation: 18 mins

## 2017-03-20 NOTE — PROGRESS NOTES
RESPIRATORY NOTE:  Pt off BIPAP this morning, says he thinks it helped a little, remains on 2 LPM nasal cannula, dyspnea noted at rest, congested productive cough.

## 2017-03-20 NOTE — PROGRESS NOTES
Called for prn tx. Second tx since 0000. Bilateral wheezing all fields noted. Congested coarse cough, non-productive. Seems very anxious, with increased s.o.b. Discussed with RN additional interventions, including BIPAP. Pt would like to hold off on BIPAP, but is open to placement if it comes to that,  Ongoing c/onasal congestion. Additional hhn with 5m Albuterol    0235 No significant change post neb, ABG done, acceptable results, but pt's WOB increasing. BIPAP started, ATIVAN given. Remains tenuous at best on BIPAP. Still very anxious, bilat wheezing, Frequent cough.

## 2017-03-20 NOTE — PROGRESS NOTES
8706: assessment completed, pt in bed, pt sob at rest. Pt on 2 liters of oxygen. Pt stated that he feels better. No other needs at this time, vitals stable. Pt educated on using call light. 1130: wife at bedside and updated on pt condition. 1200: IDR at bedside. 1300; pt is getting a spa bath. Pt stated that he wants the nurse to look at his mole on the back. 1520: pt sitting in a chair, pt stated that he feels better. Mole to the back loose but intact, small amount of blood noted around it.     1905: Bedside and Verbal shift change report given to Jose Manuel YEBOAH rn and Alex Cox RN (oncoming nurse) by Luis Bowers RN   (offgoing nurse). Report included the following information SBAR and Kardex.

## 2017-03-20 NOTE — ROUTINE PROCESS
7135: Bedside and Verbal shift change report given to Adele Coyle RN (oncoming nurse) by Sandy Vo RN   (offgoing nurse). Report included the following information SBAR, Kardex, Intake/Output, MAR, Recent Results and Cardiac Rhythm NSR.

## 2017-03-20 NOTE — PROGRESS NOTES
Hospitalist Progress Note    Patient: Dominic Patton MRN: [id number]  CSN: [id number]    YOB: 1945  Age: 66 y.o. Sex: male    DOA: 3/18/2017 LOS:  LOS: 1 day          Chief Complaint: copd exacerbation. Assessment/Plan     1. COPD exacerbation. 2. Sinusitis. 3. Renal insufficiency. 4. Scarring R lung, chronic. 5.  Presbyacusis. Continue steroids/abx/bronchodilators. Anticipate a 24-48 hrs more in the hospital.    Doing well. Stable. DVT px heparin. Patient Active Problem List   Diagnosis Code    COPD (chronic obstructive pulmonary disease) ([address] J44.9    URIEL (acute kidney injury) ([facility] N17.9    Hypertension I10       Subjective:    C/o \"stuffy nose. \"  Breathing showing improvement. Review of systems:    Constitutional: denies fevers, chills, myalgias  Respiratory: denies SOB, cough  Cardiovascular: denies chest pain, palpitations  Gastrointestinal: denies nausea, vomiting, diarrhea      Vital signs/Intake and Output:  Visit Vitals    /68 (BP 1 Location: Left arm, BP Patient Position: At rest)    Pulse (!) 103    Temp 98 °F (36.7 °C)    Resp 23    Ht 5' 9\" (1.753 m)    Wt 90.6 kg (199 lb 11.8 oz)    SpO2 95%    BMI 29.5 kg/m2     Current Shift:     Last three shifts:  03/18 1901 - 03/20 0700  In: 2366.5 [P.O.:1180; I.V.:1186.5]  Out: 2900 [Urine:2900]    Exam:    General: nad  Head/Neck: mmm  CVS:s1s2 rrr  Lungs:Breathing comfortably, no distress. Exp wheezing moderate b/l. No rales. Not coughing. Completes sentences. Abdomen: Soft, bs+  Extremities: No edema.     Labs: Results:       Chemistry Recent Labs      03/20/17   0252  03/19/17   0113  03/18/17   1630   GLU  136*  129*  108*   NA  137  140  138   K  4.1  4.5  3.9   CL  102  102  102   CO2  22  26  22   BUN  35*  26*  24*   CREA  1.51*  1.84*  1.67*   CA  8.5  9.0  8.9   AGAP  13  12  14   BUCR  23*  14  14   AP   --    --   67   TP   --    --   6.9   ALB   --    --   3.2*   GLOB --    --   3.7   AGRAT   --    --   0.9      CBC w/Diff Recent Labs      03/20/17   0252  03/19/17   0113  03/18/17   1630   WBC  8.3  7.8  8.3   RBC  3.81*  4.02*  4.01*   HGB  11.8*  12.3*  12.3*   HCT  34.7*  36.6  36.1   PLT  262  239  221   GRANS  93*  95*  68   LYMPH  3*  2*  7*   EOS  0  0  8*      Cardiac Enzymes Recent Labs      03/18/17   1630   CPK  294   CKND1  2.4      Coagulation No results for input(s): PTP, INR, APTT in the last 72 hours. No lab exists for component: INREXT    Lipid Panel No results found for: CHOL, CHOLPOCT, CHOLX, CHLST, CHOLV, [id number], HDL, LDL, NLDLCT, DLDL, LDLC, DLDLP, [id number], VLDLC, VLDL, TGL, TGLX, TRIGL, [id number], TRIGP, TGLPOCT, [id number], CHHD, CHHDX   BNP No results for input(s): BNPP in the last 72 hours.    Liver Enzymes Recent Labs      03/18/17   1630   TP  6.9   ALB  3.2*   AP  67   SGOT  16      Thyroid Studies No results found for: T4, T3U, TSH, TSHEXT     Procedures/imaging: see electronic medical records for all procedures/Xrays and details which were not copied into this note but were reviewed prior to creation of Fatou Bell MD

## 2017-03-20 NOTE — PROGRESS NOTES
Readmission Risk Assessment:     Moderate Risk and MSSP/Good Help ACO patients    RRAT Score:  13-20    Initial Assessment: Chart reviewed and spoke with Pt and wife. Pt is planning on returning home after dc with his wife. Anticipate pt will need at minimum [facility] services which he and spouse are agreeable too. List of [facility] agencies left at bedside, [address] will need to be completed. Should Pt need HOme Oxygen, will need nursing to do testing no earlier than 24 hours prior to dc. CM following. Emergency Contact:  Wife- see facesheet    Pertinent Medical Hx:   See H&P      PCP/Specialists: [facility]:       DME:          Moderate Risk Care Transition Plan:  1. Evaluate for [facility] or H, SNF, acute rehab, community care coordination of resources. 2. Involve patient/caregiver in assessment, planning, education and implement of intervention. 3. CM daily patient care huddles/interdisciplinary rounds. 4. PCP/Specialist appointment within 5 - 7 days made prior to discharge. 5. Facilitate transportation and logistics for follow-up appointments. 6. Medication reconciliation - Pharmacy  7. Formal handoff between hospital provider and post-acute provider to transition patient  Handoff to [facility] Nurse Navigator or PCP practice.

## 2017-03-20 NOTE — PROGRESS NOTES
Problem: Mobility Impaired (Adult and Pediatric)  Goal: *Acute Goals and Plan of Care (Insert Text)  Physical Therapy Goals STG/LTG  Initiated 3/19/2017 and to be accomplished within 7 day(s)  1. Patient will move from supine to sit and sit to supine in bed with modified independence. 2. Patient will transfer from bed to chair and chair to bed with modified independence using the least restrictive device. 3. Patient will perform sit to stand with modified independence. 4. Patient will ambulate with modified independence for >150 feet with the least restrictive device.     Pt refused PT due to:     [ ]  Nausea/vomiting  [X]  Eating  [ ]  Pain  [ ]  Pt lethargic  [ ]  Off Unit     Huber Yoder PT, DPT

## 2017-03-20 NOTE — PROGRESS NOTES
Problem: Self Care Deficits Care Plan (Adult)  Goal: *Acute Goals and Plan of Care (Insert Text)  Occupational Therapy Goals  Initiated 3/20/2017 within 7 day(s). 1. Patient will perform lower body dressing with supervision/set-up   2. Patient will perform toilet transfers with supervision/set-up. 3. Patient will perform all aspects of toileting with supervision/set-up. 4. Patient will complete standing with supervision/set-up for 5 minutes during ADLs to increase activity tolerance for functional activity. 5. Patient will utilize energy conservation techniques during functional activities with verbal cues. OCCUPATIONAL THERAPY EVALUATION     Patient: Oswaldo Narvaez (45 y.o. male)  Date: 3/20/2017  Primary Diagnosis: copd exacerbation  copd exacerbation  COPD (chronic obstructive pulmonary disease) ([facility])        Precautions:  Fall      ASSESSMENT :  Based on the objective data described below, the patient presents with COPD exacerbation. Pt currently on 2 L of nasal canula. Pt did not use oxygen at home. Noted wheezing and SOB during functional activity. Pt CGA for LB dressing and min assist for transfers and functional mobility. Noted decreased activity tolerance during functional activity. Pt could benefit from OT to increase I with ADLs, transfers, mobility, activity tolerance and safety. Patient will benefit from skilled intervention to address the above impairments.   Patients rehabilitation potential is considered to be Good  Factors which may influence rehabilitation potential include:   [ ]             None noted  [ ]             Mental ability/status  [ ]             Medical condition  [ ]             Home/family situation and support systems  [ ]             Safety awareness  [ ]             Pain tolerance/management  [ ]             Other:        PLAN :  Recommendations and Planned Interventions:  [X]               Self Care Training                  [X]        Therapeutic Activities  [X] Functional Mobility Training    [ ]        Cognitive Retraining  [X]               Therapeutic Exercises           [X]        Endurance Activities  [X]               Balance Training                   [ ]        Neuromuscular Re-Education  [ ]               Visual/Perceptual Training     [X]   Home Safety Training  [X]               Patient Education                 [X]        Family Training/Education  [ ]               Other (comment):     Frequency/Duration: Patient will be followed by occupational therapy 1-2 times per day/4-7 days per week to address goals. Discharge Recommendations: Home Health  Further Equipment Recommendations for Discharge: N/A       SUBJECTIVE:   Patient stated I'm wheezing a bit.       OBJECTIVE DATA SUMMARY:       Past Medical History:   Diagnosis Date    Cancer ([address]       prostate    [facility]      Hypertension      Pneumonia      Radiation effect         Past Surgical History:   Procedure Laterality Date    HX HERNIA REPAIR          Barriers to Learning/Limitations: None  Compensate with: visual, verbal, tactile, kinesthetic cues/model  Prior Level of Function/Home Situation: I with ADLs prior to admission  Home Situation  Home Environment: Trailer/mobile home  # Steps to Enter: 5  Rails to Enter: Yes  Hand Rails : Bilateral  One/Two Story Residence: One story  Living Alone: No  Support Systems: Spouse/Significant Other/Partner  Patient Expects to be Discharged to[de-identified] Private residence  Current DME Used/Available at Home: None  Tub or Shower Type: Shower  [ ]  Right hand dominant           [ ]  Left hand dominant  Cognitive/Behavioral Status:  Neurologic State: Alert; Appropriate for age  Orientation Level: Appropriate for age;Oriented X4  Cognition: Appropriate decision making; Appropriate for age attention/concentration; Appropriate safety awareness; Follows commands  Safety/Judgement: Awareness of environment; Fall prevention  Skin: intact  Edema: none noted  Vision/Perceptual:  N/A  Coordination:  Coordination: Within functional limits  Fine Motor Skills-Upper: Left Intact; Right Intact    Gross Motor Skills-Upper: Left Intact; Right Intact  Balance:  Sitting: Intact  Standing: Impaired  Standing - Static: Good  Standing - Dynamic : Fair  Strength:  Strength: Generally decreased, functional  Tone & Sensation:  Tone: Normal  Sensation: Intact  Range of Motion:  AROM: Within functional limits  Functional Mobility and Transfers for ADLs:  Bed Mobility: N/A pt sitting up in chair  Transfers:  Sit to Stand: Contact guard assistance  ADL Assessment:  Upper Body Dressing: Supervision  Lower Body Dressing: Contact guard assistance  ADL Intervention:  Cognitive Retraining  Safety/Judgement: Awareness of environment; Fall prevention     Pain:  Pain Scale 1: Numeric (0 - 10)  Pain Intensity 1: 0  Activity Tolerance:   Fair -  Please refer to the flowsheet for vital signs taken during this treatment. After treatment:   [X] Patient left in no apparent distress sitting up in chair  [ ] Patient left in no apparent distress in bed  [X] Call bell left within reach  [ ] Nursing notified  [ ] Caregiver present  [ ] Bed alarm activated      COMMUNICATION/EDUCATION:   [X] Home safety education was provided and the patient/caregiver indicated understanding. [X] Patient/family have participated as able in goal setting and plan of care. [X] Patient/family agree to work toward stated goals and plan of care. [ ] Patient understands intent and goals of therapy, but is neutral about his/her participation. [ ] Patient is unable to participate in goal setting and plan of care.      Thank you for this referral.  Vicente Valencia OTR/L  Time Calculation: 15 mins

## 2017-03-21 LAB
ANION GAP BLD CALC-SCNC: 11 MMOL/L (ref 3–18)
BASOPHILS # BLD AUTO: 0 K/UL (ref 0–0.06)
BASOPHILS # BLD: 0 % (ref 0–2)
BUN SERPL-MCNC: 36 MG/DL (ref 7–18)
BUN/CREAT SERPL: 26 (ref 12–20)
CALCIUM SERPL-MCNC: 8.4 MG/DL (ref 8.5–10.1)
CHLORIDE SERPL-SCNC: 100 MMOL/L (ref 100–108)
CO2 SERPL-SCNC: 26 MMOL/L (ref 21–32)
CREAT SERPL-MCNC: 1.41 MG/DL (ref 0.6–1.3)
DIFFERENTIAL METHOD BLD: ABNORMAL
EOSINOPHIL # BLD: 0 K/UL (ref 0–0.4)
EOSINOPHIL NFR BLD: 0 % (ref 0–5)
ERYTHROCYTE [DISTWIDTH] IN BLOOD BY AUTOMATED COUNT: 13.6 % (ref 11.6–14.5)
GLUCOSE BLD STRIP.AUTO-MCNC: 119 MG/DL (ref 70–110)
GLUCOSE BLD STRIP.AUTO-MCNC: 136 MG/DL (ref 70–110)
GLUCOSE BLD STRIP.AUTO-MCNC: 166 MG/DL (ref 70–110)
GLUCOSE BLD STRIP.AUTO-MCNC: 179 MG/DL (ref 70–110)
GLUCOSE SERPL-MCNC: 144 MG/DL (ref 74–99)
HCT VFR BLD AUTO: 34.3 % (ref 36–48)
HGB BLD-MCNC: 11.7 G/DL (ref 13–16)
LYMPHOCYTES # BLD AUTO: 3 % (ref 21–52)
LYMPHOCYTES # BLD: 0.2 K/UL (ref 0.9–3.6)
MCH RBC QN AUTO: 30.9 PG (ref 24–34)
MCHC RBC AUTO-ENTMCNC: 34.1 G/DL (ref 31–37)
MCV RBC AUTO: 90.5 FL (ref 74–97)
MONOCYTES # BLD: 0.3 K/UL (ref 0.05–1.2)
MONOCYTES NFR BLD AUTO: 5 % (ref 3–10)
NEUTS SEG # BLD: 5.8 K/UL (ref 1.8–8)
NEUTS SEG NFR BLD AUTO: 92 % (ref 40–73)
PLATELET # BLD AUTO: 240 K/UL (ref 135–420)
PMV BLD AUTO: 8.7 FL (ref 9.2–11.8)
POTASSIUM SERPL-SCNC: 4.2 MMOL/L (ref 3.5–5.5)
RBC # BLD AUTO: 3.79 M/UL (ref 4.7–5.5)
SODIUM SERPL-SCNC: 137 MMOL/L (ref 136–145)
WBC # BLD AUTO: 6.3 K/UL (ref 4.6–13.2)

## 2017-03-21 PROCEDURE — 94760 N-INVAS EAR/PLS OXIMETRY 1: CPT

## 2017-03-21 PROCEDURE — 74011250637 HC RX REV CODE- 250/637: Performed by: INTERNAL MEDICINE

## 2017-03-21 PROCEDURE — 74011250636 HC RX REV CODE- 250/636: Performed by: INTERNAL MEDICINE

## 2017-03-21 PROCEDURE — 74011000250 HC RX REV CODE- 250: Performed by: INTERNAL MEDICINE

## 2017-03-21 PROCEDURE — 65660000000 HC RM CCU STEPDOWN

## 2017-03-21 PROCEDURE — 74011250637 HC RX REV CODE- 250/637: Performed by: FAMILY MEDICINE

## 2017-03-21 PROCEDURE — 74011250636 HC RX REV CODE- 250/636

## 2017-03-21 PROCEDURE — 85025 COMPLETE CBC W/AUTO DIFF WBC: CPT | Performed by: INTERNAL MEDICINE

## 2017-03-21 PROCEDURE — 74011250636 HC RX REV CODE- 250/636: Performed by: FAMILY MEDICINE

## 2017-03-21 PROCEDURE — 82962 GLUCOSE BLOOD TEST: CPT

## 2017-03-21 PROCEDURE — 80048 BASIC METABOLIC PNL TOTAL CA: CPT | Performed by: INTERNAL MEDICINE

## 2017-03-21 PROCEDURE — 97535 SELF CARE MNGMENT TRAINING: CPT

## 2017-03-21 PROCEDURE — 36415 COLL VENOUS BLD VENIPUNCTURE: CPT | Performed by: INTERNAL MEDICINE

## 2017-03-21 PROCEDURE — 94640 AIRWAY INHALATION TREATMENT: CPT

## 2017-03-21 PROCEDURE — 97116 GAIT TRAINING THERAPY: CPT

## 2017-03-21 PROCEDURE — 77010033678 HC OXYGEN DAILY

## 2017-03-21 RX ORDER — ACETYLCYSTEINE 100 MG/ML
2 SOLUTION ORAL; RESPIRATORY (INHALATION)
Status: DISCONTINUED | OUTPATIENT
Start: 2017-03-21 | End: 2017-03-24 | Stop reason: HOSPADM

## 2017-03-21 RX ORDER — DIPHENHYDRAMINE HYDROCHLORIDE 50 MG/ML
12.5 INJECTION, SOLUTION INTRAMUSCULAR; INTRAVENOUS
Status: DISCONTINUED | OUTPATIENT
Start: 2017-03-21 | End: 2017-03-24 | Stop reason: HOSPADM

## 2017-03-21 RX ORDER — MONTELUKAST SODIUM 10 MG/1
10 TABLET ORAL
Status: DISCONTINUED | OUTPATIENT
Start: 2017-03-21 | End: 2017-03-24 | Stop reason: HOSPADM

## 2017-03-21 RX ORDER — GUAIFENESIN 600 MG/1
600 TABLET, EXTENDED RELEASE ORAL EVERY 12 HOURS
Status: DISCONTINUED | OUTPATIENT
Start: 2017-03-21 | End: 2017-03-24 | Stop reason: HOSPADM

## 2017-03-21 RX ORDER — DIPHENHYDRAMINE HYDROCHLORIDE 50 MG/ML
INJECTION, SOLUTION INTRAMUSCULAR; INTRAVENOUS
Status: COMPLETED
Start: 2017-03-21 | End: 2017-03-21

## 2017-03-21 RX ORDER — AMOXICILLIN 250 MG
2 CAPSULE ORAL DAILY
Status: DISCONTINUED | OUTPATIENT
Start: 2017-03-21 | End: 2017-03-24 | Stop reason: HOSPADM

## 2017-03-21 RX ORDER — PSEUDOEPHEDRINE HCL 30 MG
30 TABLET ORAL
Status: DISCONTINUED | OUTPATIENT
Start: 2017-03-21 | End: 2017-03-24 | Stop reason: HOSPADM

## 2017-03-21 RX ORDER — FUROSEMIDE 10 MG/ML
20 INJECTION INTRAMUSCULAR; INTRAVENOUS EVERY 12 HOURS
Status: DISCONTINUED | OUTPATIENT
Start: 2017-03-21 | End: 2017-03-22

## 2017-03-21 RX ADMIN — SALINE NASAL SPRAY 2 SPRAY: 1.5 SOLUTION NASAL at 18:20

## 2017-03-21 RX ADMIN — IPRATROPIUM BROMIDE AND ALBUTEROL SULFATE 3 ML: .5; 3 SOLUTION RESPIRATORY (INHALATION) at 07:06

## 2017-03-21 RX ADMIN — ROFLUMILAST 500 MCG: 500 TABLET ORAL at 16:33

## 2017-03-21 RX ADMIN — GUAIFENESIN 600 MG: 600 TABLET, EXTENDED RELEASE ORAL at 22:05

## 2017-03-21 RX ADMIN — Medication 2 SPRAY: at 09:32

## 2017-03-21 RX ADMIN — ALBUTEROL SULFATE 2.5 MG: 2.5 SOLUTION RESPIRATORY (INHALATION) at 16:34

## 2017-03-21 RX ADMIN — DOCUSATE SODIUM AND SENNOSIDES 2 TABLET: 8.6; 5 TABLET, FILM COATED ORAL at 09:31

## 2017-03-21 RX ADMIN — HEPARIN SODIUM 5000 UNITS: 5000 INJECTION, SOLUTION INTRAVENOUS; SUBCUTANEOUS at 12:16

## 2017-03-21 RX ADMIN — ACETYLCYSTEINE 200 MG: 100 SOLUTION ORAL; RESPIRATORY (INHALATION) at 16:34

## 2017-03-21 RX ADMIN — METHYLPREDNISOLONE SODIUM SUCCINATE 60 MG: 125 INJECTION, POWDER, FOR SOLUTION INTRAMUSCULAR; INTRAVENOUS at 18:19

## 2017-03-21 RX ADMIN — INSULIN LISPRO 2 UNITS: 100 INJECTION, SOLUTION INTRAVENOUS; SUBCUTANEOUS at 22:05

## 2017-03-21 RX ADMIN — ACETYLCYSTEINE 200 MG: 100 SOLUTION ORAL; RESPIRATORY (INHALATION) at 20:00

## 2017-03-21 RX ADMIN — CHLORTHALIDONE 25 MG: 25 TABLET ORAL at 09:31

## 2017-03-21 RX ADMIN — FUROSEMIDE 20 MG: 10 INJECTION, SOLUTION INTRAMUSCULAR; INTRAVENOUS at 16:32

## 2017-03-21 RX ADMIN — METHYLPREDNISOLONE SODIUM SUCCINATE 60 MG: 125 INJECTION, POWDER, FOR SOLUTION INTRAMUSCULAR; INTRAVENOUS at 12:16

## 2017-03-21 RX ADMIN — HEPARIN SODIUM 5000 UNITS: 5000 INJECTION, SOLUTION INTRAVENOUS; SUBCUTANEOUS at 02:08

## 2017-03-21 RX ADMIN — METHYLPREDNISOLONE SODIUM SUCCINATE 60 MG: 125 INJECTION, POWDER, FOR SOLUTION INTRAMUSCULAR; INTRAVENOUS at 05:14

## 2017-03-21 RX ADMIN — GUAIFENESIN 600 MG: 600 TABLET, EXTENDED RELEASE ORAL at 02:08

## 2017-03-21 RX ADMIN — FLUTICASONE FUROATE AND VILANTEROL TRIFENATATE 1 PUFF: 100; 25 POWDER RESPIRATORY (INHALATION) at 09:32

## 2017-03-21 RX ADMIN — ALBUTEROL SULFATE 2.5 MG: 2.5 SOLUTION RESPIRATORY (INHALATION) at 20:00

## 2017-03-21 RX ADMIN — LEVOFLOXACIN 500 MG: 5 INJECTION, SOLUTION INTRAVENOUS at 18:20

## 2017-03-21 RX ADMIN — BUDESONIDE 500 MCG: 0.5 INHALANT RESPIRATORY (INHALATION) at 07:06

## 2017-03-21 RX ADMIN — IPRATROPIUM BROMIDE AND ALBUTEROL SULFATE 3 ML: .5; 3 SOLUTION RESPIRATORY (INHALATION) at 11:34

## 2017-03-21 RX ADMIN — DIPHENHYDRAMINE HYDROCHLORIDE 12.5 MG: 50 INJECTION, SOLUTION INTRAMUSCULAR; INTRAVENOUS at 01:54

## 2017-03-21 RX ADMIN — LACTULOSE 20 G: 20 SOLUTION ORAL at 09:31

## 2017-03-21 RX ADMIN — Medication 2 SPRAY: at 12:17

## 2017-03-21 RX ADMIN — Medication 2 SPRAY: at 22:06

## 2017-03-21 RX ADMIN — INSULIN LISPRO 2 UNITS: 100 INJECTION, SOLUTION INTRAVENOUS; SUBCUTANEOUS at 16:32

## 2017-03-21 RX ADMIN — HEPARIN SODIUM 5000 UNITS: 5000 INJECTION, SOLUTION INTRAVENOUS; SUBCUTANEOUS at 18:19

## 2017-03-21 RX ADMIN — DOXAZOSIN 2 MG: 1 TABLET ORAL at 09:31

## 2017-03-21 RX ADMIN — MONTELUKAST SODIUM 10 MG: 10 TABLET, FILM COATED ORAL at 22:05

## 2017-03-21 RX ADMIN — GUAIFENESIN 600 MG: 600 TABLET, EXTENDED RELEASE ORAL at 09:31

## 2017-03-21 NOTE — CONSULTS
[facility]  Pulmonary, Critical Care, and Sleep Medicine    Initial Patient Consult    Name: Noemi Samson MRN: [id number]   : 1945 Hospital: [facility] [place]   Date: 3/21/2017        Subjective: This patient has been seen and evaluated at the request of Dr. Cody Quevedo for copd exacerbation. Patient is a 66 y.o. male multiple medical problems listed below with 60yr smoking hx  Still smokes. Brought to ED with 3days hx of progressive shortness of breath and wheezing. Pt reports that he may have sinus infection,  At one point he thought he could not brreath through his nose. No fever or chills. No n/v/  No hemoptysis. Pt has been in hospital since 3/19. I was not aware of consult. Dr. Cody Quevedo reports that he asked locum physician last on Saturday. Hence delay in consult. Pt notes without sig changes. Pt has hearing aide but refuse to wear them. Past Medical History:   Diagnosis Date    Cancer [facility])     prostate    COPD [facility]ch[facility] obstructive [address]     Hypertension     Pneumonia     Radiation effect       Past Surgical History:   Procedure Laterality Date    HX HERNIA REPAIR        Prior to Admission medications    Medication Sig Start Date End Date Taking? Authorizing Provider   albuterol (PROVENTIL VENTOLIN) 2.5 mg /3 mL (0.083 %) nebulizer solution 3 mL by Nebulization route every four (4) hours as needed for Wheezing. 12/23/15  Yes TRINA Eagle   budesonide-formoterol (SYMBICORT) 160-4.5 mcg/actuation HFA inhaler Take 2 Puffs by inhalation two (2) times a day. 12/23/15  Yes TRINA Eagle   lisinopril (PRINIVIL, ZESTRIL) 40 mg tablet Take 40 mg by mouth daily. Yes Blayne Bran MD   chlorthalidone (HYGROTEN) 25 mg tablet Take  by mouth daily.    Yes Blayne Bran MD   albuterol (PROVENTIL HFA, VENTOLIN HFA, PROAIR HFA) 90 mcg/actuation inhaler Take 2 Puffs by inhalation every four (4) hours as needed for Wheezing or Shortness of Breath. 5/19/15  Yes TRINA Vazquez   DOXAZOSIN MESYLATE (CARDURA PO) Take  by mouth.    Yes Blayne Bran, MD   Nebulizer & Compressor machine Dispense: 1 nebulizer machine w all tubing necessary 10/6/14   Ghulam Oliveira,      Allergies   Allergen Reactions    Aspirin Other (comments)     \"messes my stomach up\"      Social History   Substance Use Topics    Smoking status: Current Every Day Smoker     Types: Cigarettes    Smokeless tobacco: Not on file    Alcohol use No      Family History   Problem Relation Age of Onset    Heart Disease Mother         Current Facility-Administered Medications   Medication Dose Route Frequency    guaiFENesin ER (MUCINEX) tablet 600 mg  600 mg Oral Q12H    senna-docusate (PERICOLACE) 8.6-50 mg per tablet 2 Tab  2 Tab Oral DAILY    roflumilast (DALIRESP) tablet 500 mcg  500 mcg Oral DAILY    montelukast (SINGULAIR) tablet 10 mg  10 mg Oral QHS    acetylcysteine (MUCOMYST) 100 mg/mL (10 %) nebulizer solution 200 mg  2 mL Nebulization TID RT    [START ON 3/22/2017] umeclidinium (INCRUSE ELLIPTA) 62.5 mcg/actuation  1 Puff Inhalation DAILY    methylPREDNISolone (PF) (SOLU-MEDROL) injection 60 mg  60 mg IntraVENous Q6H    nicotine (NICODERM CQ) 21 mg/24 hr patch 1 Patch  1 Patch TransDERmal DAILY    sodium chloride (OCEAN) 0.65 % nasal spray 2 Spray  2 Spray Both Nostrils QID    heparin (porcine) injection 5,000 Units  5,000 Units SubCUTAneous Q8H    insulin lispro (HUMALOG) injection   SubCUTAneous AC&HS    levoFLOXacin (LEVAQUIN) 500 mg in D5W IVPB  500 mg IntraVENous Q24H    fluticasone-vilanterol (BREO ELLIPTA) 100mcg-25mcg/puff  1 Puff Inhalation DAILY    chlorthalidone (HYGROTEN) tablet 25 mg  25 mg Oral DAILY    doxazosin (CARDURA) tablet 2 mg  2 mg Oral DAILY       Review of Systems:  HEENT: No epistaxis, no nasal drainage, no difficulty in swallowing  Respiratory: as above  Cardiovascular: no chest pain, no palpitations, no chronic leg edema, no syncope  Gastrointestinal: no abd pain, no vomiting, no diarrhea, no bleeding symptoms  Genitourinary: No urinary symptoms  Integument/breast: No ulcers  Musculoskeletal:Neg  Neurological: No focal weakness, no seizures, no headaches  Behvioral/Psych: No anxiety, no depression  Constitutional: No fever, no chills, no weight loss, no night sweats  S     Objective:   Vital Signs:    Visit Vitals    /69    Pulse 81    Temp 97.4 °F (36.3 °C)    Resp 20    Ht 5' 9\" (1.753 m)    Wt 90.6 kg (199 lb 11.8 oz)    SpO2 98%    BMI 29.5 kg/m2       O2 Device: Nasal cannula   O2 Flow Rate (L/min): 2 l/min (DECREASED FROM 2.5L TO 2L)   Temp (24hrs), Av.9 °F (36.6 °C), Min:97.4 °F (36.3 °C), Max:98.4 °F (36.9 °C)       Intake/Output:     Intake/Output Summary (Last 24 hours) at 17 1426  Last data filed at 17 1333   Gross per 24 hour   Intake             1250 ml   Output             1160 ml   Net               90 ml         Physical Exam:   General: comfortable on nasal cannula  HEENT:  PERRL  Neck: No adenopathy or thyroid swelling  Positive JVD  CVS: S1S2 no murmurs  RS: Mod AE bilaterally, decreased BS with crackles at bases,  Scant exp wheezing.     Abd: soft, non tender, no hepatosplenomegaly  Neuro: non focal, awake, alert  Extrm: mild  leg edema   Skin: no rash    Data review:   Labs:  Recent Labs      17   0500  17   0252  17   0113   WBC  6.3  8.3  7.8   HGB  11.7*  11.8*  12.3*   HCT  34.3*  34.7*  36.6   PLT  240  262  239     Recent Labs      17   0500  17   0252  17   0113  17   1630   NA  137  137  140  138   K  4.2  4.1  4.5  3.9   CL  100  102  102  102   CO2  26  22  26  22   GLU  144*  136*  129*  108*   BUN  36*  35*  26*  24*   CREA  1.41*  1.51*  1.84*  1.67*   CA  8.4*  8.5  9.0  8.9   ALB   --    --    --   3.2*   SGOT   --    --    --   16   ALT   --    --    --   21     No results for input(s): PH, PCO2, PO2, HCO3, FIO2 in the last 72 hours.    Imaging:  I have personally reviewed the patients radiographs and have reviewed the reports: Allergies        Unspecified: Aspirin       Result Information      Status Provider Status        Final result (Exam End: 3/19/2017 10:23 AM) Open        Study Result      COMPARISON: Chest x-ray dated March 18, 2017     INDICATION: Blunt trauma. Right lower lobe mass.     TECHNIQUE: Axial was performed through the chest without contrast. Coronal and  sagittal reformations were generated. Dose reduction techniques used: Automated  exposure control, adjustment of the mAs and/or kVp according to patient's size,  and iterative reconstruction techniques.     ============     LUNGS and pleura: There is an ovoid peripherally calcified heterogeneous but  predominately hyperattenuating pleural-based mass along the anterior right upper  lobe which measures 2.6 x 1.8 cm transaxial by up to 4.9 cm craniocaudal. This  likely corresponds to the ovoid opacity described on the prior radiographs and  was present dating back to the earliest prior exam from 2008 although there are  progressive dystrophic calcifications.     Additionally, there is a complex loculated pleural fluid collection with  dystrophic calcifications along the periphery as well as internal heterogeneous  high attenuation and scattered calcific debris. The collection measures  approximately 9.1 x 3.7 cm transaxial by approximately 12.1 cm craniocaudal.  Additional scattered calcified pleural plaque is seen extending along the  posterior right upper lobe.     Associated asymmetric volume loss in the right lung. There is mild dependent  pleural thickening inferiorly on the left. There are a few streaky bands of  scarring and atelectasis throughout the right lung.     AIRWAY: Unremarkable.     MEDIASTINUM: Normal heart size. No pericardial effusion. Great vessels are  unremarkable.  No thoracic adenopathy.     UPPER ABDOMEN: Scattered calcified granulomas throughout the liver.     OTHER: No aggressive osseous lesions. Exaggerated thoracic kyphosis. Multilevel  spondylosis. The bones are osteopenic.     ============     IMPRESSION  IMPRESSION:        1. Chronic dystrophic pleural-based masslike fluid collections with  heterogeneous complex internal attenuation suggesting sequela of a chronic  granulomatous process. The appearance is atypical for calcified pleural plaques  in the setting of asbestos-related pleural disease. Findings may represent a  form of a chronic empyema and correspond to the radiographic findings dating  back to at least 2008.              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    ·       RECOMMENDATIONS:   · Stop the duoneb and pulmicort  · Continue Ellipita-breo and Incruse  · Prn albuterol  · Will add singulair for nasal congestion  · Will add mucomyst to assist in mucus plugging  · Nicotine patch  · Continue systemic steroid  · Diurese  · echo  · No need for further w/u for plaque  · DVT, PUD prophylaxis   Discussed with daughter  And patinent  Thank you for the consultation       Jairo Souza MD

## 2017-03-21 NOTE — PROGRESS NOTES
Hospitalist Progress Note    Patient: Vignesh Alfaro MRN: [id number]  CSN: [id number]    YOB: 1945  Age: 66 y.o. Sex: male    DOA: 3/18/2017 LOS:  LOS: 2 days            Assessment/Plan     1. COPD exacerbation, still w active bronchospasm  2. Sinusitis  3. HTN controlled  4. 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 Cardiovascular: No chest pain or pressure. No palpitations. Pulmonary: + shortness of breath. Gastrointestinal: No nausea, vomiting. Objective:        Vital signs/Intake and Output:  Visit Vitals    /67 (BP 1 Location: Right arm, BP Patient Position: At rest)    Pulse (!) 101    Temp 97.6 °F (36.4 °C)    Resp 22    Ht 5' 9\" (1.753 m)    Wt 90.6 kg (199 lb 11.8 oz)    SpO2 98%    BMI 29.5 kg/m2     Current Shift:     Last three shifts:  03/19 1901 - 03/21 0700  In: 5946 [P.O.:1940; I.V.:305]  Out: 2060 [Urine:2060]    Body mass index is 29.5 kg/(m^2).     Physical Exam:  GEN: Alert and oriented times three NAD  EYES: conjunctiva normal, lids with out lesions  HEENT: MMM, No thyromegaly, no lymphadenopathy  HEART: RRR +S1 +S2, no JVD, pulses 2+ distally  LUNGS: expiratory wheezes, no rales or rhonchi  ABDOMEN: + BS, soft NT/ND no organomegaly,  No rebound  EXTREMITIES: No edema cyanosis, cap refill normal   SKIN: no rashes or skin breakdown, no nodules, normal turgor  Current Facility-Administered Medications Medication Dose Route Frequency    guaiFENesin ER (MUCINEX) tablet 600 mg  600 mg Oral Q12H    diphenhydrAMINE (BENADRYL) injection 12.5 mg  12.5 mg IntraVENous Q4H PRN    pseudoephedrine (SUDAFED) tablet 30 mg  30 mg Oral Q6H PRN    methylPREDNISolone (PF) (SOLU-MEDROL) injection 60 mg  60 mg IntraVENous Q6H    LORazepam (ATIVAN) injection 1 mg  1 mg IntraVENous Q4H PRN    fluticasone (FLONASE) 50 mcg/actuation nasal spray 2 Spray  2 Spray Both Nostrils BID PRN    nicotine (NICODERM CQ) 21 mg/24 hr patch 1 Patch  1 Patch TransDERmal DAILY    sodium chloride (OCEAN) 0.65 % nasal spray 2 Spray  2 Spray Both Nostrils Q2H PRN    budesonide (PULMICORT) 500 mcg/2 ml nebulizer suspension  500 mcg Nebulization BID RT    albuterol-ipratropium (DUO-NEB) 2.5 MG-0.5 MG/3 ML  3 mL Nebulization QID RT    sodium chloride (OCEAN) 0.65 % nasal spray 2 Spray  2 Spray Both Nostrils QID    albuterol (PROVENTIL VENTOLIN) nebulizer solution 2.5 mg  2.5 mg Nebulization Q2H PRN    acetaminophen (TYLENOL) tablet 650 mg  650 mg Oral Q4H PRN    heparin (porcine) injection 5,000 Units  5,000 Units SubCUTAneous Q8H    insulin lispro (HUMALOG) injection   SubCUTAneous AC&HS    glucose chewable tablet 16 g  4 Tab Oral PRN    glucagon (GLUCAGEN) injection 1 mg  1 mg IntraMUSCular PRN    dextrose (D50W) injection syrg 12.5-25 g  25-50 mL IntraVENous PRN    levoFLOXacin (LEVAQUIN) 500 mg in D5W IVPB  500 mg IntraVENous Q24H    fluticasone-vilanterol (BREO ELLIPTA) 100mcg-25mcg/puff  1 Puff Inhalation DAILY    chlorthalidone (HYGROTEN) tablet 25 mg  25 mg Oral DAILY    doxazosin (CARDURA) tablet 2 mg  2 mg Oral DAILY         All the patient's labs over the past 24 hours were reviewed both during my initial daily workflow process and at the time notated as \"note time\" in [facility]. (It is not time stamped separately in this workflow.)  Select labs are listed below.         Labs: Results:       Chemistry Recent Labs 03/21/17   0500  03/20/17   0252  03/19/17   0113  03/18/17   1630   GLU  144*  136*  129*  108*   NA  137  137  140  138   K  4.2  4.1  4.5  3.9   CL  100  102  102  102   CO2  26  22  26  22   BUN  36*  35*  26*  24*   CREA  1.41*  1.51*  1.84*  1.67*   CA  8.4*  8.5  9.0  8.9   AGAP  11  13  12  14   BUCR  26*  23*  14  14   AP   --    --    --   67   TP   --    --    --   6.9   ALB   --    --    --   3.2*   GLOB   --    --    --   3.7   AGRAT   --    --    --   0.9      CBC w/Diff Recent Labs      03/21/17   0500  03/20/17   0252  03/19/17   0113   WBC  6.3  8.3  7.8   RBC  3.79*  3.81*  4.02*   HGB  11.7*  11.8*  12.3*   HCT  34.3*  34.7*  36.6   PLT  240  262  239   GRANS  92*  93*  95*   LYMPH  3*  3*  2*   EOS  0  0  0      Cardiac Enzymes Recent Labs      03/18/17   1630   CPK  294   CKND1  2.4                  Liver Enzymes Recent Labs      03/18/17   1630   TP  6.9   ALB  3.2*   AP  67   SGOT  16          Procedures/imaging: see electronic medical records for all procedures/Xrays and details which were not copied into this note but were reviewed prior to creation of [address] [place] [address] Rd, DO  Internal Medicine/Geriatrics

## 2017-03-21 NOTE — ROUTINE PROCESS
0827: assessment completed. Pt sob with activities and at rest. Call light within reach. 8116: pt up to the chair, took his medications. No other needs. Call light within reach. 1230: no changes in pt assessment. Call light within reach. 1400: wife at bedside, Dr. Adolph Natarajan at bedside. 1600: pt educated on new orders. No needs at this time, still sitting in a chair and watching tv.     1830: pt denies needs, medications given. 1930: Bedside and Verbal shift change report given to Hunter Chow RN (oncoming nurse) by Fatou Em RN   (offgoing nurse). Report included the following information SBAR and Kardex.

## 2017-03-21 NOTE — PROGRESS NOTES
Problem: Self Care Deficits Care Plan (Adult)  Goal: *Acute Goals and Plan of Care (Insert Text)  Occupational Therapy Goals  Initiated 3/20/2017 within 7 day(s). 1. Patient will perform lower body dressing with supervision/set-up   2. Patient will perform toilet transfers with supervision/set-up. 3. Patient will perform all aspects of toileting with supervision/set-up. 4. Patient will complete standing with supervision/set-up for 5 minutes during ADLs to increase activity tolerance for functional activity. 5. Patient will utilize energy conservation techniques during functional activities with verbal cues. Outcome: Progressing Towards Goal  OCCUPATIONAL THERAPY TREATMENT     Patient: Clarence Rey (27 y.o. male)  Date: 3/21/2017  Diagnosis: copd exacerbation  copd exacerbation  COPD (chronic obstructive pulmonary disease) (HCC) <principal problem not specified>       Precautions: Fall  Chart, occupational therapy assessment, plan of care, and goals were reviewed. ASSESSMENT:  Pt seated in chair upon entering, agreeable to therapy. Pt completed sit to stand transfer, functional/bathroom mobility, and grooming tasks while standing at sink with SBA-CGA. Pt stood at sink for ~3-4 minutes performing grooming tasks. Pt remained on 2L of supplemental O2 throughout session, SpO2 remained >97%. While seated in chair, pt educated on appropriate UE exercises to improve strength and overall activity tolerance. Reviewed energy conservation techniques with pt. EDUCATION: UE exercises, energy conservation techniques  Progression toward goals:  [ ]          Improving appropriately and progressing toward goals  [X]          Improving slowly and progressing toward goals  [ ]          Not making progress toward goals and plan of care will be adjusted       PLAN:  Patient continues to benefit from skilled intervention to address the above impairments. Continue treatment per established plan of care.   Discharge Recommendations:  Home Health  Further Equipment Recommendations for Discharge:  N/A       SUBJECTIVE:   Patient stated .      OBJECTIVE DATA SUMMARY:      G CODES:       Cognitive/Behavioral Status:  Neurologic State: Alert  Orientation Level: Oriented X4  Cognition: Follows commands  Safety/Judgement: Fall prevention  Functional Mobility and Transfers for ADLs:                 Transfers:  Sit to Stand: Contact guard assistance              Bathroom Mobility: Stand-by assistance;Contact guard assistance                 Balance:  Sitting: Intact  Standing: Impaired  Standing - Static: Good  Standing - Dynamic : Fair  ADL Intervention:  Grooming  Grooming Assistance: Stand-by assistance  Washing Face: Stand-by assistance  Washing Hands: Stand-by assistance  Brushing Teeth: Stand-by assistance     Upper Body Dressing Assistance  Dressing Assistance: Minimum assistance  Shirt simulation with hospital gown: Minimum  assistance     Cognitive Retraining  Safety/Judgement: Fall prevention     Therapeutic Exercises:   Review UE exercises appropriate for pt: shoulder flexion, chest presses, elbow flexion/extension,      Pain:  Pre Treatment:  Post Treatment:  Pain Scale 1: Numeric (0 - 10)  Pain Intensity 1: 0     Activity Tolerance:    fair  Please refer to the flowsheet for vital signs taken during this treatment.   After treatment:   [X]  Patient left in no apparent distress sitting up in chair  [ ]  Patient left in no apparent distress in bed  [X]  Call bell left within reach  [ ]  Nursing notified  [ ]  Caregiver present  [ ]  Bed alarm activated     Shaun Richards MS OTR/L  Time Calculation: 17 mins

## 2017-03-21 NOTE — PROGRESS NOTES
1905: Assumed care of patient resting in bed, bed in lowest position, call bell with in reach. Patient demonstrated and verbalized on how to call for assistance. Alarm parameters reviewed, on, and audible. 8305: Patient unable to expectorate sputum and experienced a coughing spell and generalized itching. Patient sitting up on the edge of the bed to improve ventilation. Vitals signs taken /67, Pulse 101, Temp 97.6, Resp 22, O2 Sat 96%,  2L NC. Dr. Anna Treadwell paged     0144: Dr. Anna Treadwell returned page orders obtianed for Mucinex 600mg by mouth Q12h and Benadryl 12.5mg IV Q4    0240: Patient observed laying in bed resting quietly. No signs or symptoms of respiratory distress, itching has subsided. 6781: Shift summary: No sign/symptoms of respiratory distress, O2 sat maintained above 95%. Patient left resting in bed, bed in lowest position, call light with in reach.

## 2017-03-21 NOTE — ROUTINE PROCESS
0750: Bedside and Verbal shift change report given to Deondre Grewal RN (oncoming nurse) by Valentina Jenkins RN   (offgoing nurse). Report included the following information SBAR, Kardex, Intake/Output, MAR, Recent Results and Cardiac Rhythm NSR.

## 2017-03-21 NOTE — PROGRESS NOTES
Problem: Mobility Impaired (Adult and Pediatric)  Goal: *Acute Goals and Plan of Care (Insert Text)  Physical Therapy Goals STG/LTG  Initiated 3/19/2017 and to be accomplished within 7 day(s)  1. Patient will move from supine to sit and sit to supine in bed with modified independence. 2. Patient will transfer from bed to chair and chair to bed with modified independence using the least restrictive device. 3. Patient will perform sit to stand with modified independence. 4. Patient will ambulate with modified independence for >150 feet with the least restrictive device. Outcome: Progressing Towards Goal  PHYSICAL THERAPY TREATMENT     Patient: Ghislaine Pineda (8 y.o. male)  Date: 3/21/2017  Diagnosis: copd exacerbation  copd exacerbation  COPD (chronic obstructive pulmonary disease) ([facility]) <principal problem not specified>       Precautions: Fall  Chart, physical therapy assessment, plan of care and goals were reviewed. ASSESSMENT:  Pt sitting up in recliner upon arrival and agreeable to participate w/ PT. Pt able to participate in gt training w/ RW, GB and CGA w/ 2 L O2 via NC. Pt required one standing rest period during gt training however demonstrated good breathing techniques throughout session. Pt returned to sitting in recliner w/ all needs within reach. Daughter present. Nurse aware. Pt making significant improvement w/ tolerance for gt distance and recommend RW for home use. Recommend HHPT upon hospital d/c. Progression toward goals:  [X]      Improving appropriately and progressing toward goals  [ ]      Improving slowly and progressing toward goals  [ ]      Not making progress toward goals and plan of care will be adjusted       PLAN:  Patient continues to benefit from skilled intervention to address the above impairments. Continue treatment per established plan of care.   Discharge Recommendations:  Home Health  Further Equipment Recommendations for Discharge:  rolling walker SUBJECTIVE:   Patient stated Alfonzo Levin that will be good.       OBJECTIVE DATA SUMMARY:   Critical Behavior:  Neurologic State: Alert, Appropriate for age  Orientation Level: Oriented X4  Cognition: Follows commands, Appropriate decision making, Appropriate for age attention/concentration, Appropriate safety awareness  Safety/Judgement: Fall prevention  Functional Mobility Training:  Bed Mobility:  Scooting: Supervision  Transfers:  Sit to Stand: Contact guard assistance (vc)  Stand to Sit: Contact guard assistance  Balance:  Sitting: Intact  Standing: Intact; With support  Standing - Static: Good;Constant support  Standing - Dynamic : Fair              Range Of Motion:  Ambulation/Gait Training:  Distance (ft): 130 Feet (ft)  Assistive Device: Walker, rolling;Gait belt (2L O2 via NC)  Ambulation - Level of Assistance: Contact guard assistance; Additional time  Gait Abnormalities: Decreased step clearance  Base of Support: Widened  Speed/[place]is[place]: Slow  Step Length: Left shortened;Right shortened  Interventions: Safety awareness training;Verbal cues  Neuro Re-Education:  Therapeutic Exercises:   Pain:  Pain Scale 1: Numeric (0 - 10)  Pain Intensity 1: 0  Activity Tolerance:   Fair   Please refer to the flowsheet for vital signs taken during this treatment.   After treatment:   [X] Patient left in no apparent distress sitting up in chair  [ ] Patient left in no apparent distress in bed  [X] Call bell left within reach  [X] Nursing notified  [X] Caregiver present  [ ] Bed alarm activated      Larry Liriano PT   Time Calculation: 12 mins

## 2017-03-22 PROBLEM — Z72.0 TOBACCO ABUSE: Chronic | Status: ACTIVE | Noted: 2017-03-22

## 2017-03-22 LAB
GLUCOSE BLD STRIP.AUTO-MCNC: 128 MG/DL (ref 70–110)
GLUCOSE BLD STRIP.AUTO-MCNC: 141 MG/DL (ref 70–110)
GLUCOSE BLD STRIP.AUTO-MCNC: 153 MG/DL (ref 70–110)
GLUCOSE BLD STRIP.AUTO-MCNC: 157 MG/DL (ref 70–110)

## 2017-03-22 PROCEDURE — 82962 GLUCOSE BLOOD TEST: CPT

## 2017-03-22 PROCEDURE — 74011000250 HC RX REV CODE- 250: Performed by: INTERNAL MEDICINE

## 2017-03-22 PROCEDURE — 74011250637 HC RX REV CODE- 250/637: Performed by: INTERNAL MEDICINE

## 2017-03-22 PROCEDURE — 94640 AIRWAY INHALATION TREATMENT: CPT

## 2017-03-22 PROCEDURE — 94760 N-INVAS EAR/PLS OXIMETRY 1: CPT

## 2017-03-22 PROCEDURE — 74011250637 HC RX REV CODE- 250/637: Performed by: FAMILY MEDICINE

## 2017-03-22 PROCEDURE — 97110 THERAPEUTIC EXERCISES: CPT

## 2017-03-22 PROCEDURE — 74011636637 HC RX REV CODE- 636/637: Performed by: FAMILY MEDICINE

## 2017-03-22 PROCEDURE — 74011250636 HC RX REV CODE- 250/636: Performed by: FAMILY MEDICINE

## 2017-03-22 PROCEDURE — 74011250636 HC RX REV CODE- 250/636: Performed by: INTERNAL MEDICINE

## 2017-03-22 PROCEDURE — 97116 GAIT TRAINING THERAPY: CPT

## 2017-03-22 PROCEDURE — 65660000000 HC RM CCU STEPDOWN

## 2017-03-22 PROCEDURE — 77010033678 HC OXYGEN DAILY

## 2017-03-22 PROCEDURE — 93306 TTE W/DOPPLER COMPLETE: CPT

## 2017-03-22 RX ORDER — IPRATROPIUM BROMIDE AND ALBUTEROL SULFATE 2.5; .5 MG/3ML; MG/3ML
3 SOLUTION RESPIRATORY (INHALATION)
Status: DISCONTINUED | OUTPATIENT
Start: 2017-03-22 | End: 2017-03-23

## 2017-03-22 RX ORDER — LEVOFLOXACIN 500 MG/1
500 TABLET, FILM COATED ORAL EVERY 24 HOURS
Status: DISCONTINUED | OUTPATIENT
Start: 2017-03-22 | End: 2017-03-24 | Stop reason: HOSPADM

## 2017-03-22 RX ORDER — FUROSEMIDE 10 MG/ML
40 INJECTION INTRAMUSCULAR; INTRAVENOUS DAILY
Status: DISCONTINUED | OUTPATIENT
Start: 2017-03-23 | End: 2017-03-23

## 2017-03-22 RX ORDER — FUROSEMIDE 10 MG/ML
40 INJECTION INTRAMUSCULAR; INTRAVENOUS ONCE
Status: COMPLETED | OUTPATIENT
Start: 2017-03-22 | End: 2017-03-22

## 2017-03-22 RX ADMIN — DOXAZOSIN 2 MG: 1 TABLET ORAL at 08:58

## 2017-03-22 RX ADMIN — ACETYLCYSTEINE 200 MG: 100 SOLUTION ORAL; RESPIRATORY (INHALATION) at 13:12

## 2017-03-22 RX ADMIN — HEPARIN SODIUM 5000 UNITS: 5000 INJECTION, SOLUTION INTRAVENOUS; SUBCUTANEOUS at 12:43

## 2017-03-22 RX ADMIN — ACETYLCYSTEINE 200 MG: 100 SOLUTION ORAL; RESPIRATORY (INHALATION) at 20:38

## 2017-03-22 RX ADMIN — IPRATROPIUM BROMIDE AND ALBUTEROL SULFATE 3 ML: .5; 3 SOLUTION RESPIRATORY (INHALATION) at 23:31

## 2017-03-22 RX ADMIN — UMECLIDINIUM 1 PUFF: 62.5 AEROSOL, POWDER ORAL at 12:43

## 2017-03-22 RX ADMIN — INSULIN LISPRO 2 UNITS: 100 INJECTION, SOLUTION INTRAVENOUS; SUBCUTANEOUS at 12:43

## 2017-03-22 RX ADMIN — FUROSEMIDE 40 MG: 10 INJECTION, SOLUTION INTRAMUSCULAR; INTRAVENOUS at 21:43

## 2017-03-22 RX ADMIN — GUAIFENESIN 600 MG: 600 TABLET, EXTENDED RELEASE ORAL at 08:58

## 2017-03-22 RX ADMIN — MONTELUKAST SODIUM 10 MG: 10 TABLET, FILM COATED ORAL at 21:20

## 2017-03-22 RX ADMIN — GUAIFENESIN 600 MG: 600 TABLET, EXTENDED RELEASE ORAL at 21:20

## 2017-03-22 RX ADMIN — Medication 2 SPRAY: at 09:02

## 2017-03-22 RX ADMIN — Medication 2 SPRAY: at 13:00

## 2017-03-22 RX ADMIN — Medication 2 SPRAY: at 21:44

## 2017-03-22 RX ADMIN — INSULIN LISPRO 2 UNITS: 100 INJECTION, SOLUTION INTRAVENOUS; SUBCUTANEOUS at 16:30

## 2017-03-22 RX ADMIN — METHYLPREDNISOLONE SODIUM SUCCINATE 60 MG: 125 INJECTION, POWDER, FOR SOLUTION INTRAMUSCULAR; INTRAVENOUS at 00:26

## 2017-03-22 RX ADMIN — PREDNISONE 50 MG: 20 TABLET ORAL at 12:44

## 2017-03-22 RX ADMIN — ACETYLCYSTEINE 200 MG: 100 SOLUTION ORAL; RESPIRATORY (INHALATION) at 07:30

## 2017-03-22 RX ADMIN — HEPARIN SODIUM 5000 UNITS: 5000 INJECTION, SOLUTION INTRAVENOUS; SUBCUTANEOUS at 02:55

## 2017-03-22 RX ADMIN — CHLORTHALIDONE 25 MG: 25 TABLET ORAL at 08:58

## 2017-03-22 RX ADMIN — LEVOFLOXACIN 500 MG: 500 TABLET, FILM COATED ORAL at 21:43

## 2017-03-22 RX ADMIN — IPRATROPIUM BROMIDE AND ALBUTEROL SULFATE 3 ML: .5; 3 SOLUTION RESPIRATORY (INHALATION) at 21:37

## 2017-03-22 RX ADMIN — ROFLUMILAST 500 MCG: 500 TABLET ORAL at 08:58

## 2017-03-22 RX ADMIN — HEPARIN SODIUM 5000 UNITS: 5000 INJECTION, SOLUTION INTRAVENOUS; SUBCUTANEOUS at 21:19

## 2017-03-22 RX ADMIN — ALBUTEROL SULFATE 2.5 MG: 2.5 SOLUTION RESPIRATORY (INHALATION) at 07:30

## 2017-03-22 RX ADMIN — FUROSEMIDE 20 MG: 10 INJECTION, SOLUTION INTRAMUSCULAR; INTRAVENOUS at 03:00

## 2017-03-22 RX ADMIN — METHYLPREDNISOLONE SODIUM SUCCINATE 60 MG: 125 INJECTION, POWDER, FOR SOLUTION INTRAMUSCULAR; INTRAVENOUS at 05:33

## 2017-03-22 RX ADMIN — Medication 2 SPRAY: at 18:00

## 2017-03-22 RX ADMIN — FLUTICASONE FUROATE AND VILANTEROL TRIFENATATE 1 PUFF: 100; 25 POWDER RESPIRATORY (INHALATION) at 09:01

## 2017-03-22 RX ADMIN — ALBUTEROL SULFATE 2.5 MG: 2.5 SOLUTION RESPIRATORY (INHALATION) at 21:19

## 2017-03-22 RX ADMIN — DOCUSATE SODIUM AND SENNOSIDES 2 TABLET: 8.6; 5 TABLET, FILM COATED ORAL at 08:58

## 2017-03-22 RX ADMIN — ALBUTEROL SULFATE 2.5 MG: 2.5 SOLUTION RESPIRATORY (INHALATION) at 13:12

## 2017-03-22 NOTE — PROGRESS NOTES
[facility]  Pulmonary, Critical Care, and Sleep Medicine      Name: Ana Baeza MRN: [id number]   : 1945 Hospital: [facility] [place]   Date: 3/22/2017        Subjective:   3/22  No issues overnight   Feels much better   off oxygen. Excellent response with lasix. Pt reports that he was on diuretic in past, but was stopped for unclear reason        This patient has been seen and evaluated at the request of Dr. Anusha Booker for copd exacerbation. Patient is a 66 y.o. male multiple medical problems listed below with 60yr smoking hx  Still smokes. Brought to ED with 3days hx of progressive shortness of breath and wheezing. Pt reports that he may have sinus infection,  At one point he thought he could not brreath through his nose. No fever or chills. No n/v/  No hemoptysis. Pt has been in hospital since 3/19. I was not aware of consult. Dr. Anusha Booker reports that he asked locum physician last on Saturday. Hence delay in consult. Pt notes without sig changes. Pt has hearing aide but refuse to wear them. Past Medical History:   Diagnosis Date    Cancer [facility])     prostate    COPD (chronic obstructive pulmonary disease) ([address]     Hypertension     Pneumonia     Radiation effect       Past Surgical History:   Procedure Laterality Date    HX HERNIA REPAIR        Prior to Admission medications    Medication Sig Start Date End Date Taking? Authorizing Provider   albuterol (PROVENTIL VENTOLIN) 2.5 mg /3 mL (0.083 %) nebulizer solution 3 mL by Nebulization route every four (4) hours as needed for Wheezing. 12/23/15  Yes TRINA Cisneros Ma   budesonide-formoterol (SYMBICORT) 160-4.5 mcg/actuation HFA inhaler Take 2 Puffs by inhalation two (2) times a day. 12/23/15  Yes TRINA Cisneros Ma   lisinopril (PRINIVIL, ZESTRIL) 40 mg tablet Take 40 mg by mouth daily. Yes Blayne Bran MD   chlorthalidone (HYGROTEN) 25 mg tablet Take  by mouth daily.    Yes Blayne Bran MD albuterol (PROVENTIL HFA, VENTOLIN HFA, PROAIR HFA) 90 mcg/actuation inhaler Take 2 Puffs by inhalation every four (4) hours as needed for Wheezing or Shortness of Breath. 5/19/15  Yes TRINA Vazquez   DOXAZOSIN MESYLATE (CARDURA PO) Take  by mouth.    Yes Phys Other, MD   Nebulizer & Compressor machine Dispense: 1 nebulizer machine w all tubing necessary 10/6/14   Johan Muniz,      Allergies   Allergen Reactions    Aspirin Other (comments)     \"messes my stomach up\"      Social History   Substance Use Topics    Smoking status: Current Every Day Smoker     Types: Cigarettes    Smokeless tobacco: Not on file    Alcohol use No      Family History   Problem Relation Age of Onset    Heart Disease Mother         Current Facility-Administered Medications   Medication Dose Route Frequency    levoFLOXacin (LEVAQUIN) tablet 500 mg  500 mg Oral Q24H    predniSONE (DELTASONE) tablet 50 mg  50 mg Oral DAILY WITH LUNCH    guaiFENesin ER (MUCINEX) tablet 600 mg  600 mg Oral Q12H    senna-docusate (PERICOLACE) 8.6-50 mg per tablet 2 Tab  2 Tab Oral DAILY    roflumilast (DALIRESP) tablet 500 mcg  500 mcg Oral DAILY    montelukast (SINGULAIR) tablet 10 mg  10 mg Oral QHS    acetylcysteine (MUCOMYST) 100 mg/mL (10 %) nebulizer solution 200 mg  2 mL Nebulization TID RT    umeclidinium (INCRUSE ELLIPTA) 62.5 mcg/actuation  1 Puff Inhalation DAILY    furosemide (LASIX) injection 20 mg  20 mg IntraVENous Q12H    nicotine (NICODERM CQ) 21 mg/24 hr patch 1 Patch  1 Patch TransDERmal DAILY    sodium chloride (OCEAN) 0.65 % nasal spray 2 Spray  2 Spray Both Nostrils QID    heparin (porcine) injection 5,000 Units  5,000 Units SubCUTAneous Q8H    insulin lispro (HUMALOG) injection   SubCUTAneous AC&HS    fluticasone-vilanterol (BREO ELLIPTA) 100mcg-25mcg/puff  1 Puff Inhalation DAILY    chlorthalidone (HYGROTEN) tablet 25 mg  25 mg Oral DAILY    doxazosin (CARDURA) tablet 2 mg  2 mg Oral DAILY Objective:   Vital Signs:    Visit Vitals    /79    Pulse 75    Temp 97.5 °F (36.4 °C)    Resp 21    Ht 5' 9\" (1.753 m)    Wt 89 kg (196 lb 3.4 oz)    SpO2 92%    BMI 28.98 kg/m2       O2 Device: Room air   O2 Flow Rate (L/min): 2 l/min   Temp (24hrs), Av.7 °F (36.5 °C), Min:97.3 °F (36.3 °C), Max:98.2 °F (36.8 °C)       Intake/Output:     Intake/Output Summary (Last 24 hours) at 17 1407  Last data filed at 17 0901   Gross per 24 hour   Intake              900 ml   Output             3150 ml   Net            -2250 ml         Physical Exam:   General: comfortable on nasal cannula  HEENT:  PERRL  Neck: No adenopathy or thyroid swelling  Positive JVD  CVS: S1S2 no murmurs  RS: Mod AE bilaterally, decreased BS with crackles at bases,  Scant exp wheezing. Abd: soft, non tender, no hepatosplenomegaly  Neuro: non focal, awake, alert  Extrm: mild  leg edema   Skin: no rash    Data review:   Labs:  Recent Labs      17   0500  17   0252   WBC  6.3  8.3   HGB  11.7*  11.8*   HCT  34.3*  34.7*   PLT  240  262     Recent Labs      17   0500  17   0252   NA  137  137   K  4.2  4.1   CL  100  102   CO2  26  22   GLU  144*  136*   BUN  36*  35*   CREA  1.41*  1.51*   CA  8.4*  8.5     No results for input(s): PH, PCO2, PO2, HCO3, FIO2 in the last 72 hours. Imaging:  I have personally reviewed the patients radiographs and have reviewed the reports: Allergies        Unspecified: Aspirin       Result Information      Status Provider Status        Final result (Exam End: 3/19/2017 10:23 AM) Open        Study Result      COMPARISON: Chest x-ray dated 2017     INDICATION: Blunt trauma. Right lower lobe mass.     TECHNIQUE: Axial was performed through the chest without contrast. Coronal and  sagittal reformations were generated. Dose reduction techniques used:  Automated  exposure control, adjustment of the mAs and/or kVp according to patient's size,  and iterative reconstruction techniques.     ============     LUNGS and pleura: There is an ovoid peripherally calcified heterogeneous but  predominately hyperattenuating pleural-based mass along the anterior right upper  lobe which measures 2.6 x 1.8 cm transaxial by up to 4.9 cm craniocaudal. This  likely corresponds to the ovoid opacity described on the prior radiographs and  was present dating back to the earliest prior exam from 2008 although there are  progressive dystrophic calcifications.     Additionally, there is a complex loculated pleural fluid collection with  dystrophic calcifications along the periphery as well as internal heterogeneous  high attenuation and scattered calcific debris. The collection measures  approximately 9.1 x 3.7 cm transaxial by approximately 12.1 cm craniocaudal.  Additional scattered calcified pleural plaque is seen extending along the  posterior right upper lobe.     Associated asymmetric volume loss in the right lung. There is mild dependent  pleural thickening inferiorly on the left. There are a few streaky bands of  scarring and atelectasis throughout the right lung.     AIRWAY: Unremarkable.     MEDIASTINUM: Normal heart size. No pericardial effusion. Great vessels are  unremarkable. No thoracic adenopathy.     UPPER ABDOMEN: Scattered calcified granulomas throughout the liver.     OTHER: No aggressive osseous lesions. Exaggerated thoracic kyphosis. Multilevel  spondylosis. The bones are osteopenic.     ============     IMPRESSION  IMPRESSION:        1. Chronic dystrophic pleural-based masslike fluid collections with  heterogeneous complex internal attenuation suggesting sequela of a chronic  granulomatous process. The appearance is atypical for calcified pleural plaques  in the setting of asbestos-related pleural disease.  Findings may represent a  form of a chronic empyema and correspond to the radiographic findings dating  back to at least 2008.              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    ·       RECOMMENDATIONS:   · Continue Ellipita-breo and Incruse  · Prn albuterol  · singulair  · Nicotine patch  · Po prednisone  · Diurese  · No need for further w/u for plaque  · DVT, PUD prophylaxis           Allison Black MD

## 2017-03-22 NOTE — ROUTINE PROCESS
Shift summary:  Patient came off of nasal canula today. Alarm parameters reviewed and opportunities for questions provided. Bedside and Verbal shift change report given to Cristine Mchugh RN (oncoming nurse) by Aaron Gomez RN   (offgoing nurse). Report included the following information SBAR, Kardex, Procedure Summary, Intake/Output, MAR, Accordion, Recent Results and Med Rec Status.

## 2017-03-22 NOTE — PROGRESS NOTES
1935: Assumed care of patient resting in bed, bed in lowest position, call bell with in reach. Patient demonstrated and verbalized on how to call for assistance. Alarm parameters reviewed, on, and audible. [id number]: The documentation for this period is being entered following the guidelines as defined in the [facility] policy by Paramjit Perez. 3390: Shift summary: Patient had an uneventful shift. No BIPAP needed this shift. Left resting in bed no acute distress, call light with in reach.

## 2017-03-22 NOTE — DIABETES MGMT
NUTRITION / GLYCEMIC CONTROL PLAN OF CARE        Diabetes Management:      -pt with known h/o of steroid induced hyperglycemia,requiring minimal corrective coverage  - A1C history appears to be in the prediabetes range  - recommend:    *continue POCT + corrective coverage   *monitor and adjust insulin as needed  - goals:   BG in target range (non-ICU <140 mg/dl by 3/27   *PO intake will be 75% of meals offered by 3/27  - TDD: 4 units corrective coverage  - BG range: 119-176 mg/dl  - Hypo: none  - BG in target range (non-ICU: <140; ICU<180): [] Yes  [x] No  - Steroids: IV Q 6 hours  - Intake: good   Patient Vitals for the past 100 hrs:   % Diet Eaten   03/22/17 0900 100 %   03/21/17 2300 100 %   03/21/17 1825 100 %   03/21/17 1333 100 %   03/20/17 1818 100 %   03/20/17 1311 80 %   03/20/17 0930 50 %   03/20/17 0525 30 %   03/19/17 2229 100 %   03/19/17 1813 100 %   03/19/17 0951 100 %        Current Insulin regimen:   Humalog corrective insulin coverage    Home medication/insulin regimen:  No PTA Diabetic Meds listed      Recent Glucose Results: Lab Results   Component Value Date/Time    GLUCPOC 141 (H) 03/22/2017 06:12 AM    GLUCPOC 166 (H) 03/21/2017 09:47 PM    GLUCPOC 179 (H) 03/21/2017 04:12 PM         Adequate glycemic control PTA:  [] Yes  [x] No    HbA1c: equivalent  to ave BGlucose of 123  mg/dl for 2-3 months prior to admission    Lab Results   Component Value Date/Time    Hemoglobin A1c 5.9 03/18/2017 04:30 PM          Diet:   Active Orders   Diet    DIET CARDIAC Regular         Sheela Mina RN, MS  Glycemic Control Team

## 2017-03-22 NOTE — PROGRESS NOTES
Problem: Self Care Deficits Care Plan (Adult)  Goal: *Acute Goals and Plan of Care (Insert Text)  Occupational Therapy Goals  Initiated 3/20/2017 within 7 day(s). 1. Patient will perform lower body dressing with supervision/set-up   2. Patient will perform toilet transfers with supervision/set-up. 3. Patient will perform all aspects of toileting with supervision/set-up. 4. Patient will complete standing with supervision/set-up for 5 minutes during ADLs to increase activity tolerance for functional activity. 5. Patient will utilize energy conservation techniques during functional activities with verbal cues. Occupational Therapy Treatment Attempt     Chart reviewed. Attempted Occupational Therapy Treatment, however, pt receiving ECHO at bedside. Will continue to follow.       Thank you for this referral.  Brina Lindsey, OTR/L

## 2017-03-22 NOTE — PROGRESS NOTES
Hospitalist Progress Note    Patient: Lenora Guerrero MRN: [id number]  CSN: [id number]    YOB: 1945  Age: 66 y.o.   Sex: male    DOA: 3/18/2017 LOS:  LOS: 3 days          Chief Complaint:    Shortness of breath      Assessment/Plan     Hospital Problems  Date Reviewed: 3/18/2017          Codes Class Noted POA    Tobacco abuse (Chronic) ICD-10-CM: Z72.0  ICD-9-CM: 305.1  3/22/2017 Yes        * (Principal)COPD (chronic obstructive pulmonary disease) ([facility] [place] [facility].) ICD-10-CM: J44.9  ICD-9-CM: 501  10/2/2014 Yes        URIEL (acute kidney injury) ([facility] [place] [facility] ICD-10-CM: N17.9  ICD-9-CM: 584.9  10/2/2014 Yes        Hypertension ICD-10-CM: I10  ICD-9-CM: 401.9  Unknown Yes            Respiratory status slightly improved  - Duonebs and pulmicort stopped per pulmonology yesterday  - Ellipta-breo and Incruse continued  - prn albuterol  - Singulair added for nasal congestion  - mucomyst added for mucous plugging  - Systemic steroids/antibiotics continue: transition to PO meds today  - Diuresis recommended for fluid overload/pulm edema  - Echo ordered/pending    BG meeting inpatient goal: 140-180mg/dL  - SSI    Nicotine patch    URIEL  - Improving slowly  - Judicious diuresis/fluid managment    PT/OT  - Rec home w/ HH    CM for DC planning    FENGI: Saline lock, p-heparin, cardiac diet, no GI prophylaxis required    Dispo: pending continued improvement in respiratory status on po medications; possible 48h    Subjective:    No acute events overnight; AFVSS, maintaining O2 sats on minimal O2 (~1.5L NC); no new complaints    Review of systems:    Constitutional: denies fevers, chills, myalgias  Respiratory: + SOB, cough  Cardiovascular: denies chest pain, palpitations  Gastrointestinal: denies nausea, vomiting, diarrhea      Vital signs/Intake and Output:  Visit Vitals    /79    Pulse 75    Temp 97.5 °F (36.4 °C)    Resp 21    Ht 5' 9\" (1.753 m)    Wt 89 kg (196 lb 3.4 oz)    SpO2 92%    BMI 28.98 kg/m2     Current Shift:  03/22 0701 - 03/22 1900  In: 300 [P.O.:300]  Out: 300 [Urine:300]  Last three shifts:  03/20 1901 - 03/22 0700  In: 7931 [P.O.:1240; I.V.:270]  Out: 4010 [Urine:4010]    Exam:    General: Well developed, alert, NAD, OX3  Head/Neck: NCAT, supple, No masses, No lymphadenopathy  CVS:Regular rate and rhythm, no M/R/G, S1/S2 heard, no thrill  Lungs:decreased air movement bilaterally, scattered wheezes, bibasilar rales  Abdomen: Soft, Nontender, No distention, Normal Bowel sounds, No hepatomegaly  Extremities: No C/C/E, pulses palpable 2+  Skin:normal texture and turgor, no rashes, no lesions  Neuro:grossly normal , follows commands  Psych:appropriate                Labs: Results:       Chemistry Recent Labs      03/21/17   0500  03/20/17   0252   GLU  144*  136*   NA  137  137   K  4.2  4.1   CL  100  102   CO2  26  22   BUN  36*  35*   CREA  1.41*  1.51*   CA  8.4*  8.5   AGAP  11  13   BUCR  26*  23*      CBC w/Diff Recent Labs      03/21/17   0500  03/20/17   0252   WBC  6.3  8.3   RBC  3.79*  3.81*   HGB  11.7*  11.8*   HCT  34.3*  34.7*   PLT  240  262   GRANS  92*  93*   LYMPH  3*  3*   EOS  0  0      Cardiac Enzymes No results for input(s): CPK, CKND1, WILLARD in the last 72 hours. No lab exists for component: CKRMB, TROIP   Coagulation No results for input(s): PTP, INR, APTT in the last 72 hours. No lab exists for component: INREXT, INREXT    Lipid Panel No results found for: CHOL, CHOLPOCT, CHOLX, CHLST, CHOLV, [id number], HDL, LDL, NLDLCT, DLDL, LDLC, DLDLP, [id number], VLDLC, VLDL, TGL, TGLX, TRIGL, [id number], TRIGP, TGLPOCT, [id number], CHHD, CHHDX   BNP No results for input(s): BNPP in the last 72 hours. Liver Enzymes No results for input(s): TP, ALB, TBIL, AP, SGOT, GPT in the last 72 hours.     No lab exists for component: DBIL   Thyroid Studies No results found for: T4, T3U, TSH, TSHEXT, TSHEXT     Procedures/imaging: see electronic medical records for all procedures/Xrays and details which were not copied into this note but were reviewed prior to [facility], DO

## 2017-03-22 NOTE — PROGRESS NOTES
Problem: Mobility Impaired (Adult and Pediatric)  Goal: *Acute Goals and Plan of Care (Insert Text)  Physical Therapy Goals STG/LTG  Initiated 3/19/2017 and to be accomplished within 7 day(s)  1. Patient will move from supine to sit and sit to supine in bed with modified independence. 2. Patient will transfer from bed to chair and chair to bed with modified independence using the least restrictive device. 3. Patient will perform sit to stand with modified independence. 4. Patient will ambulate with modified independence for >150 feet with the least restrictive device. Outcome: Progressing Towards Goal  PHYSICAL THERAPY TREATMENT     Patient: Emmy Bonner (89 y.o. male)  Date: 3/22/2017  Diagnosis: copd exacerbation  copd exacerbation  COPD (chronic obstructive pulmonary disease) ([facility]) COPD (chronic obstructive pulmonary disease) ([facility])  Precautions: Fall   Chart, physical therapy assessment, plan of care and goals were reviewed. ASSESSMENT:  Patient found sitting in b/s chair willing o work with Pt. Pt found on 2LO2 via nasal cannula. Cannula removed and pt on room air throughout PT tx. Pt's O2 sat never drops below 95%, pt does present with audibly heavy breathing with exertion, but states he is about at his baseline COPD wise. Pt ambulated with RW one slight LOB in turn requiring min A. Pt reports using no AD at home, pt may need SPC or RW at home at D/C TBD with PT progress. Pt performed seated therex and educated on supine therex. Pt left sitting in chair with needs in reach. Nursing notified of progress and pt left on room air. Education: therex on own. Progression toward goals:  [ ]      Improving appropriately and progressing toward goals  [X]      Improving slowly and progressing toward goals  [ ]      Not making progress toward goals and plan of care will be adjusted       PLAN:  Patient continues to benefit from skilled intervention to address the above impairments.   Continue treatment per established plan of care. Discharge Recommendations:  Home Health  Further Equipment Recommendations for Discharge:  rolling walker / [facility]? SUBJECTIVE:   Patient stated I'm breathing a lot better.       OBJECTIVE DATA SUMMARY:   Critical Behavior:  Neurologic State: Alert, Appropriate for age  Orientation Level: Oriented X4  Cognition: Appropriate decision making, Appropriate for age attention/concentration, Appropriate safety awareness, Follows commands  Safety/Judgement: Fall prevention  Functional Mobility Training:  Transfers:  Sit to Stand: Stand-by asssistance  Stand to Sit: Stand-by asssistance  Balance:  Sitting: Intact  Standing: Intact; With support  Standing - Static: Good;Constant support  Standing - Dynamic : Fair  Ambulation/Gait Training:  Distance (ft): 160 Feet (ft)  Assistive Device: Walker, rolling;Gait belt  Ambulation - Level of Assistance: Contact guard assistance  Gait Abnormalities: Decreased step clearance  Base of Support: Widened  Speed/[place]: Slow;Pace decreased (<100 feet/min)  Step Length: Left shortened;Right shortened  Interventions: Verbal cues  Therapeutic Exercises:   LAQ, seated marches, ankle pumps X 10 bilaterally (2 sets each)  Pain:  Pre tx pain: 0  Post tx pain: 0  Pain Scale 1: Visual  Pain Intensity 1: 0  Activity Tolerance:   Fair  Please refer to the flowsheet for vital signs taken during this treatment.   After treatment:   [ ] Patient left in no apparent distress sitting up in chair  [X] Patient left in no apparent distress in bed  [X] Call bell left within reach  [ ] Nursing notified  [ ] Caregiver present  [ ] Bed alarm activated      Yesenia Ley PTA   Time Calculation: 23 mins

## 2017-03-22 NOTE — PROGRESS NOTES
Cm called and spoke with patient via phone to discuss discharge plan [facility] completed for [facility].

## 2017-03-22 NOTE — ROUTINE PROCESS
4862: Bedside and Verbal shift change report given to Pratibha Mendoza RN (oncoming nurse) by Valentín Cottrell RN   (offgoing nurse). Report included the following information SBAR, Kardex, Intake/Output, MAR, Recent Results and Cardiac Rhythm NSR.

## 2017-03-23 LAB
ALBUMIN SERPL BCP-MCNC: 2.7 G/DL (ref 3.4–5)
ALBUMIN/GLOB SERPL: 0.8 {RATIO} (ref 0.8–1.7)
ALP SERPL-CCNC: 48 U/L (ref 45–117)
ALT SERPL-CCNC: 31 U/L (ref 16–61)
ANION GAP BLD CALC-SCNC: 11 MMOL/L (ref 3–18)
AST SERPL W P-5'-P-CCNC: 18 U/L (ref 15–37)
BASOPHILS # BLD AUTO: 0 K/UL (ref 0–0.06)
BASOPHILS # BLD: 0 % (ref 0–2)
BILIRUB SERPL-MCNC: 0.2 MG/DL (ref 0.2–1)
BUN SERPL-MCNC: 45 MG/DL (ref 7–18)
BUN/CREAT SERPL: 28 (ref 12–20)
CALCIUM SERPL-MCNC: 8.4 MG/DL (ref 8.5–10.1)
CHLORIDE SERPL-SCNC: 96 MMOL/L (ref 100–108)
CO2 SERPL-SCNC: 28 MMOL/L (ref 21–32)
CREAT SERPL-MCNC: 1.63 MG/DL (ref 0.6–1.3)
DIFFERENTIAL METHOD BLD: ABNORMAL
EOSINOPHIL # BLD: 0 K/UL (ref 0–0.4)
EOSINOPHIL NFR BLD: 0 % (ref 0–5)
ERYTHROCYTE [DISTWIDTH] IN BLOOD BY AUTOMATED COUNT: 13.3 % (ref 11.6–14.5)
GLOBULIN SER CALC-MCNC: 3.3 G/DL (ref 2–4)
GLUCOSE BLD STRIP.AUTO-MCNC: 133 MG/DL (ref 70–110)
GLUCOSE BLD STRIP.AUTO-MCNC: 136 MG/DL (ref 70–110)
GLUCOSE BLD STRIP.AUTO-MCNC: 149 MG/DL (ref 70–110)
GLUCOSE BLD STRIP.AUTO-MCNC: 204 MG/DL (ref 70–110)
GLUCOSE SERPL-MCNC: 139 MG/DL (ref 74–99)
HCT VFR BLD AUTO: 36.7 % (ref 36–48)
HGB BLD-MCNC: 13 G/DL (ref 13–16)
LYMPHOCYTES # BLD AUTO: 10 % (ref 21–52)
LYMPHOCYTES # BLD: 0.7 K/UL (ref 0.9–3.6)
MAGNESIUM SERPL-MCNC: 2 MG/DL (ref 1.8–2.4)
MCH RBC QN AUTO: 31.4 PG (ref 24–34)
MCHC RBC AUTO-ENTMCNC: 35.4 G/DL (ref 31–37)
MCV RBC AUTO: 88.6 FL (ref 74–97)
MONOCYTES # BLD: 0.1 K/UL (ref 0.05–1.2)
MONOCYTES NFR BLD AUTO: 1 % (ref 3–10)
NEUTS SEG # BLD: 6.2 K/UL (ref 1.8–8)
NEUTS SEG NFR BLD AUTO: 89 % (ref 40–73)
PLATELET # BLD AUTO: 237 K/UL (ref 135–420)
PMV BLD AUTO: 8.7 FL (ref 9.2–11.8)
POTASSIUM SERPL-SCNC: 3.2 MMOL/L (ref 3.5–5.5)
PROT SERPL-MCNC: 6 G/DL (ref 6.4–8.2)
RBC # BLD AUTO: 4.14 M/UL (ref 4.7–5.5)
SODIUM SERPL-SCNC: 135 MMOL/L (ref 136–145)
WBC # BLD AUTO: 7 K/UL (ref 4.6–13.2)

## 2017-03-23 PROCEDURE — 94760 N-INVAS EAR/PLS OXIMETRY 1: CPT

## 2017-03-23 PROCEDURE — 94640 AIRWAY INHALATION TREATMENT: CPT

## 2017-03-23 PROCEDURE — 74011250636 HC RX REV CODE- 250/636: Performed by: FAMILY MEDICINE

## 2017-03-23 PROCEDURE — 74011000250 HC RX REV CODE- 250: Performed by: INTERNAL MEDICINE

## 2017-03-23 PROCEDURE — 51798 US URINE CAPACITY MEASURE: CPT

## 2017-03-23 PROCEDURE — 74011250637 HC RX REV CODE- 250/637: Performed by: INTERNAL MEDICINE

## 2017-03-23 PROCEDURE — 74011250636 HC RX REV CODE- 250/636: Performed by: INTERNAL MEDICINE

## 2017-03-23 PROCEDURE — 36415 COLL VENOUS BLD VENIPUNCTURE: CPT | Performed by: FAMILY MEDICINE

## 2017-03-23 PROCEDURE — 65660000000 HC RM CCU STEPDOWN

## 2017-03-23 PROCEDURE — 82962 GLUCOSE BLOOD TEST: CPT

## 2017-03-23 PROCEDURE — 97535 SELF CARE MNGMENT TRAINING: CPT

## 2017-03-23 PROCEDURE — 74011636637 HC RX REV CODE- 636/637: Performed by: FAMILY MEDICINE

## 2017-03-23 PROCEDURE — 85025 COMPLETE CBC W/AUTO DIFF WBC: CPT | Performed by: FAMILY MEDICINE

## 2017-03-23 PROCEDURE — 74011250637 HC RX REV CODE- 250/637: Performed by: FAMILY MEDICINE

## 2017-03-23 PROCEDURE — 80053 COMPREHEN METABOLIC PANEL: CPT | Performed by: FAMILY MEDICINE

## 2017-03-23 PROCEDURE — 97116 GAIT TRAINING THERAPY: CPT

## 2017-03-23 PROCEDURE — 77010033678 HC OXYGEN DAILY

## 2017-03-23 PROCEDURE — 83735 ASSAY OF MAGNESIUM: CPT | Performed by: FAMILY MEDICINE

## 2017-03-23 PROCEDURE — 97530 THERAPEUTIC ACTIVITIES: CPT

## 2017-03-23 RX ORDER — POTASSIUM CHLORIDE 1.5 G/1.77G
20 POWDER, FOR SOLUTION ORAL 2 TIMES DAILY WITH MEALS
Status: DISCONTINUED | OUTPATIENT
Start: 2017-03-23 | End: 2017-03-24 | Stop reason: HOSPADM

## 2017-03-23 RX ADMIN — FLUTICASONE FUROATE AND VILANTEROL TRIFENATATE 1 PUFF: 100; 25 POWDER RESPIRATORY (INHALATION) at 08:15

## 2017-03-23 RX ADMIN — FUROSEMIDE 40 MG: 10 INJECTION, SOLUTION INTRAMUSCULAR; INTRAVENOUS at 08:14

## 2017-03-23 RX ADMIN — LEVOFLOXACIN 500 MG: 500 TABLET, FILM COATED ORAL at 22:09

## 2017-03-23 RX ADMIN — ACETYLCYSTEINE 200 MG: 100 SOLUTION ORAL; RESPIRATORY (INHALATION) at 19:42

## 2017-03-23 RX ADMIN — LORAZEPAM 1 MG: 2 INJECTION INTRAMUSCULAR at 22:10

## 2017-03-23 RX ADMIN — HEPARIN SODIUM 5000 UNITS: 5000 INJECTION, SOLUTION INTRAVENOUS; SUBCUTANEOUS at 05:36

## 2017-03-23 RX ADMIN — Medication 2 SPRAY: at 22:12

## 2017-03-23 RX ADMIN — PREDNISONE 50 MG: 20 TABLET ORAL at 15:26

## 2017-03-23 RX ADMIN — Medication 2 SPRAY: at 19:32

## 2017-03-23 RX ADMIN — ACETYLCYSTEINE 200 MG: 100 SOLUTION ORAL; RESPIRATORY (INHALATION) at 07:00

## 2017-03-23 RX ADMIN — IPRATROPIUM BROMIDE AND ALBUTEROL SULFATE 3 ML: .5; 3 SOLUTION RESPIRATORY (INHALATION) at 08:00

## 2017-03-23 RX ADMIN — UMECLIDINIUM 1 PUFF: 62.5 AEROSOL, POWDER ORAL at 08:14

## 2017-03-23 RX ADMIN — ACETAMINOPHEN 650 MG: 325 TABLET ORAL at 15:51

## 2017-03-23 RX ADMIN — ROFLUMILAST 500 MCG: 500 TABLET ORAL at 08:15

## 2017-03-23 RX ADMIN — CHLORTHALIDONE 25 MG: 25 TABLET ORAL at 08:12

## 2017-03-23 RX ADMIN — INSULIN LISPRO 4 UNITS: 100 INJECTION, SOLUTION INTRAVENOUS; SUBCUTANEOUS at 22:11

## 2017-03-23 RX ADMIN — ALBUTEROL SULFATE 2.5 MG: 2.5 SOLUTION RESPIRATORY (INHALATION) at 19:42

## 2017-03-23 RX ADMIN — POTASSIUM CHLORIDE 20 MEQ: 1.5 POWDER, FOR SOLUTION ORAL at 15:25

## 2017-03-23 RX ADMIN — DOXAZOSIN 2 MG: 1 TABLET ORAL at 08:14

## 2017-03-23 RX ADMIN — HEPARIN SODIUM 5000 UNITS: 5000 INJECTION, SOLUTION INTRAVENOUS; SUBCUTANEOUS at 15:26

## 2017-03-23 RX ADMIN — DOCUSATE SODIUM AND SENNOSIDES 2 TABLET: 8.6; 5 TABLET, FILM COATED ORAL at 08:14

## 2017-03-23 RX ADMIN — Medication 2 SPRAY: at 13:00

## 2017-03-23 RX ADMIN — IPRATROPIUM BROMIDE AND ALBUTEROL SULFATE 3 ML: .5; 3 SOLUTION RESPIRATORY (INHALATION) at 11:16

## 2017-03-23 RX ADMIN — FLUTICASONE PROPIONATE 2 SPRAY: 50 SPRAY, METERED NASAL at 08:14

## 2017-03-23 RX ADMIN — IPRATROPIUM BROMIDE AND ALBUTEROL SULFATE 3 ML: .5; 3 SOLUTION RESPIRATORY (INHALATION) at 04:33

## 2017-03-23 RX ADMIN — Medication 2 SPRAY: at 08:16

## 2017-03-23 RX ADMIN — IPRATROPIUM BROMIDE AND ALBUTEROL SULFATE 3 ML: .5; 3 SOLUTION RESPIRATORY (INHALATION) at 06:59

## 2017-03-23 RX ADMIN — POTASSIUM CHLORIDE 20 MEQ: 1.5 POWDER, FOR SOLUTION ORAL at 22:11

## 2017-03-23 RX ADMIN — ALBUTEROL SULFATE 2.5 MG: 2.5 SOLUTION RESPIRATORY (INHALATION) at 06:30

## 2017-03-23 RX ADMIN — GUAIFENESIN 600 MG: 600 TABLET, EXTENDED RELEASE ORAL at 08:14

## 2017-03-23 RX ADMIN — HEPARIN SODIUM 5000 UNITS: 5000 INJECTION, SOLUTION INTRAVENOUS; SUBCUTANEOUS at 22:11

## 2017-03-23 RX ADMIN — ALBUTEROL SULFATE 2.5 MG: 2.5 SOLUTION RESPIRATORY (INHALATION) at 15:17

## 2017-03-23 RX ADMIN — GUAIFENESIN 600 MG: 600 TABLET, EXTENDED RELEASE ORAL at 22:09

## 2017-03-23 RX ADMIN — METHYLPREDNISOLONE SODIUM SUCCINATE 40 MG: 40 INJECTION, POWDER, FOR SOLUTION INTRAMUSCULAR; INTRAVENOUS at 01:09

## 2017-03-23 RX ADMIN — ACETYLCYSTEINE 200 MG: 100 SOLUTION ORAL; RESPIRATORY (INHALATION) at 15:18

## 2017-03-23 RX ADMIN — MONTELUKAST SODIUM 10 MG: 10 TABLET, FILM COATED ORAL at 22:09

## 2017-03-23 RX ADMIN — METHYLPREDNISOLONE SODIUM SUCCINATE 40 MG: 40 INJECTION, POWDER, FOR SOLUTION INTRAMUSCULAR; INTRAVENOUS at 05:36

## 2017-03-23 NOTE — PROGRESS NOTES
Hospitalist Progress Note    Patient: Timothy Rahman MRN: [id number]  CSN: [id number]    YOB: 1945  Age: 66 y.o.   Sex: male    DOA: 3/18/2017 LOS:  LOS: 4 days          Chief Complaint:    Shortness of breath      Assessment/Plan     Hospital Problems  Date Reviewed: 3/18/2017          Codes Class Noted POA    Tobacco abuse (Chronic) ICD-10-CM: Z72.0  ICD-9-CM: 305.1  3/22/2017 Yes        * (Principal)COPD (chronic obstructive pulmonary disease) ([facility].) ICD-10-CM: J44.9  ICD-9-CM: 850  10/2/2014 Yes        URIEL (acute kidney injury) ([facility].) ICD-10-CM: N17.9  ICD-9-CM: 584.9  10/2/2014 Yes        Hypertension ICD-10-CM: B91  ICD-9-CM: 401.9  Unknown Yes            Respiratory status significantly improved  - Duonebs and pulmicort stopped per pulmonology 3/21  - Ellipta-breo and Incruse continue  - prn albuterol  - Singulair added for nasal congestion 3/21  - mucomyst added for mucous plugging 3/21  - Systemic steroids/antibiotics continue: transitioned to PO meds 3/22  - Diuresis for fluid overload/pulm edema 3/22--dc lasix today 3/23--continue chlorthalidone 25mg daily  - Echo ordered but not performed; re-ordered today 3/23  - wean O2 as tolerated  - ambulatory O2 measurement today    Hypokalemia  - replete PO  - check daily  - check Mg    BG meeting inpatient goal: 140-180mg/dL  - SSI    Nicotine patch    URIEL  - was improving now worsened due to diuresis  - Judicious diuresis/fluid managment    PT/OT  - Rec home w/ HH    CM for DC planning    FENGI: Saline lock, p-heparin, cardiac diet, no GI prophylaxis required    Dispo: pending continued improvement in respiratory status on po medications; possible dc tomorrow; O2 eval today    Subjective:    No acute events overnight; AFVSS, maintaining O2 sats on minimal O2 (~1.5L NC); no new complaints; feeling much better    Review of systems:    Constitutional: denies fevers, chills, myalgias  Respiratory: + SOB, cough  Cardiovascular: denies chest pain, palpitations  Gastrointestinal: denies nausea, vomiting, diarrhea      Vital signs/Intake and Output:  Visit Vitals    /67 (BP 1 Location: Left arm, BP Patient Position: Sitting)    Pulse (!) 101    Temp 97.4 °F (36.3 °C)    Resp 17    Ht 5' 9\" (1.753 m)    Wt 88.5 kg (195 lb 1.7 oz)    SpO2 96%    BMI 28.81 kg/m2     Current Shift:  03/23 0701 - 03/23 1900  In: 360 [P.O.:360]  Out: 900 [Urine:900]  Last three shifts:  03/21 1901 - 03/23 0700  In: 800 [P.O.:740; I.V.:60]  Out: 4225 [Urine:4225]    Exam:    General: Well developed, alert, NAD, OX3  Head/Neck: NCAT, supple, No masses, No lymphadenopathy  CVS:Regular rate and rhythm, no M/R/G, S1/S2 heard, no thrill  Lungs:CTAB no wheezes, rales, or rhonchi today; markedly improved  Abdomen: Soft, Nontender, No distention, Normal Bowel sounds, No hepatomegaly  Extremities: No C/C/E, pulses palpable 2+  Skin:normal texture and turgor, no rashes, no lesions  Neuro:grossly normal , follows commands  Psych:appropriate                Labs: Results:       Chemistry Recent Labs      03/23/17   0258  03/21/17   0500   GLU  139*  144*   NA  135*  137   K  3.2*  4.2   CL  96*  100   CO2  28  26   BUN  45*  36*   CREA  1.63*  1.41*   CA  8.4*  8.4*   AGAP  11  11   BUCR  28*  26*   AP  48   --    TP  6.0*   --    ALB  2.7*   --    GLOB  3.3   --    AGRAT  0.8   --       CBC w/Diff Recent Labs      03/23/17   0258  03/21/17   0500   WBC  7.0  6.3   RBC  4.14*  3.79*   HGB  13.0  11.7*   HCT  36.7  34.3*   PLT  237  240   GRANS  89*  92*   LYMPH  10*  3*   EOS  0  0      Cardiac Enzymes No results for input(s): CPK, CKND1, WILLARD in the last 72 hours. No lab exists for component: CKRMB, TROIP   Coagulation No results for input(s): PTP, INR, APTT in the last 72 hours.     No lab exists for component: INREXT, INREXT    Lipid Panel No results found for: CHOL, CHOLPOCT, CHOLX, CHLST, CHOLV, [id number], HDL, LDL, NLDLCT, DLDL, LDLC, DLDLP, [id number], VLDLC, VLDL, TGL, TGLX, TRIGL, [id number], TRIGP, TGLPOCT, [id number], CHHD, CHHDX   BNP No results for input(s): BNPP in the last 72 hours.    Liver Enzymes Recent Labs      03/23/17   0258   TP  6.0*   ALB  2.7*   AP  48   SGOT  18      Thyroid Studies No results found for: T4, T3U, TSH, TSHEXT, TSHEXT     Procedures/imaging: see electronic medical records for all procedures/Xrays and details which were not copied into this note but were reviewed prior to creation of [facility] [facility]293, DO

## 2017-03-23 NOTE — PROGRESS NOTES
Problem: Self Care Deficits Care Plan (Adult)  Goal: *Acute Goals and Plan of Care (Insert Text)  Occupational Therapy Goals  Initiated 3/20/2017 within 7 day(s). 1. Patient will perform lower body dressing with supervision/set-up   2. Patient will perform toilet transfers with supervision/set-up. 3. Patient will perform all aspects of toileting with supervision/set-up. 4. Patient will complete standing with supervision/set-up for 5 minutes during ADLs to increase activity tolerance for functional activity. 5. Patient will utilize energy conservation techniques during functional activities with verbal cues. Outcome: Progressing Towards Goal  OCCUPATIONAL THERAPY TREATMENT     Patient: Juanice Rubinstein (39 y.o. male)  Date: 3/23/2017  Diagnosis: copd exacerbation  copd exacerbation  COPD (chronic obstructive pulmonary disease) ([facility]) COPD (chronic obstructive pulmonary disease) ([facility])       Precautions: Fall  Chart, occupational therapy assessment, plan of care, and goals were reviewed. ASSESSMENT:  Pt seated in chair upon entering, agreeable to therapy. Pt demonstrated ability to don/doff socks with supervision. Pt donned gown like robe with set up assistance. Pt completed sit to stand transfer, functional/bathroom mobility, grooming while standing at sink, and all aspects of toileting while standing at sink with supervision-SBA and verbal cueing using RW. Reviewed energy conservation techniques and appropriate UE exercises for pt. SpO2 remained >93% on room air throughout evaluation. Pt left seated in chair with needs in reach.    EDUCATION: appropriate UE exercises and energy conservation  Progression toward goals:  [ ]          Improving appropriately and progressing toward goals  [X]          Improving slowly and progressing toward goals  [ ]          Not making progress toward goals and plan of care will be adjusted       PLAN:  Patient continues to benefit from skilled intervention to address the above impairments. Continue treatment per established plan of care. Discharge Recommendations:  Home Health  Further Equipment Recommendations for Discharge:  rolling walker       SUBJECTIVE:   Patient stated .      OBJECTIVE DATA SUMMARY:      G CODES:      Cognitive/Behavioral Status:  Neurologic State: Alert  Orientation Level: Oriented X4  Cognition: Follows commands  Safety/Judgement: Fall prevention  Functional Mobility and Transfers for ADLs:              Transfers:  Sit to Stand: Stand-by asssistance              Bathroom Mobility: Stand-by assistance                 Balance:  Sitting: Intact  Standing: Intact; With support  ADL Intervention:  Grooming  Grooming Assistance: Stand-by assistance  Washing Face: Stand-by assistance  Washing Hands: Stand-by assistance  Brushing Teeth: Stand-by assistance  Brushing/Combing Hair: Stand-by assistance     Upper Body Dressing Assistance  Dressing Assistance: [address] [place] [address] gown: Modified independent     Lower Body Dressing Assistance  Dressing Assistance: Supervision/set up  Socks: Supervision/set-up  Leg Crossed Method Used: No  Position Performed: Bending forward method;Seated in chair     Toileting  Toileting Assistance: Stand-by assistance  Bladder Hygiene: Stand-by assistance  Clothing Management: Stand-by assistance     Cognitive Retraining  Safety/Judgement: Fall prevention     Therapeutic Exercises:   Reviewed appropriate UE exercises for pt to complete throughout day  Pain:  Pre Treatment: 0  Post Treatment:  Pain Scale 1: FLACC  Pain Intensity 1: 0     Activity Tolerance:    good  Please refer to the flowsheet for vital signs taken during this treatment.   After treatment:   [X]  Patient left in no apparent distress sitting up in chair  [ ]  Patient left in no apparent distress in bed  [X]  Call bell left within reach  [ ]  Nursing notified  [ ]  Caregiver present  [ ]  Bed alarm activated     Papa Montoya MS OTR/L  Time Calculation: 23 mins

## 2017-03-23 NOTE — PROGRESS NOTES
2001:  Bedside and Verbal shift change report received from Dara Graff, RN (offgoing nurse) to Tresa Beavers RN (oncoming nurse). Report included the following information SBAR, Kardex, Intake/Output, MAR, Recent Results and Cardiac Rhythm SR. Pt resting in bed. Appears to be in no distress at this time. Call bell within reach. Zone phone number given to pt. Pt instructed to call zone phone or call bell if needed. Pt instructed to call for assistance before ambulating. 2115:  Found Pt in bed, struggling to breathe. Pt was using accessory muscles to breathe and grunting, with audible wheezes. Applied O2 at [facility] and Proventil nebulizer 2.5 mg solution. Spoke to Dr. Ute Simon, requested and received order for duo nebs now and then every 4h, Lasix 40mg now, and solumedrol 40mg every 6h.

## 2017-03-23 NOTE — PROGRESS NOTES
[facility]  Pulmonary, Critical Care, and Sleep Medicine      Name: Juanice Rubinstein MRN: [id number]   : 1945 Hospital: [facility] [place]   Date: 3/23/2017        Subjective:   3/23  No issues overnight. Pt is back on duoneb scheduled every 4 inaddition to Tai carcamo and incruse. This patient has been seen and evaluated at the request of Dr. Kwabena Lopez for copd exacerbation. Patient is a 66 y.o. male multiple medical problems listed below with 60yr smoking hx  Still smokes. Brought to ED with 3days hx of progressive shortness of breath and wheezing. Pt reports that he may have sinus infection,  At one point he thought he could not brreath through his nose. No fever or chills. No n/v/  No hemoptysis. Pt has been in hospital since 3/19. I was not aware of consult. Dr. Kwabena Lopez reports that he asked locum physician last on Saturday. Hence delay in consult. Pt notes without sig changes. Pt has hearing aide but refuse to wear them. Past Medical History:   Diagnosis Date    Cancer [facility])     prostate    COPD (chronic obstructive pulmonary disease) ([facility] [address]     Hypertension     Pneumonia     Radiation effect       Past Surgical History:   Procedure Laterality Date    HX HERNIA REPAIR        Prior to Admission medications    Medication Sig Start Date End Date Taking? Authorizing Provider   albuterol (PROVENTIL VENTOLIN) 2.5 mg /3 mL (0.083 %) nebulizer solution 3 mL by Nebulization route every four (4) hours as needed for Wheezing. 12/23/15  Yes TRINA Ford   budesonide-formoterol (SYMBICORT) 160-4.5 mcg/actuation HFA inhaler Take 2 Puffs by inhalation two (2) times a day. 12/23/15  Yes TRINA Ford   lisinopril (PRINIVIL, ZESTRIL) 40 mg tablet Take 40 mg by mouth daily. Yes Blayne Bran MD   chlorthalidone (HYGROTEN) 25 mg tablet Take  by mouth daily.    Yes Blayne Bran MD   albuterol (PROVENTIL HFA, VENTOLIN HFA, PROAIR HFA) 90 mcg/actuation inhaler Take 2 Puffs by inhalation every four (4) hours as needed for Wheezing or Shortness of Breath. 5/19/15  Yes TRINA Vazquez   DOXAZOSIN MESYLATE (CARDURA PO) Take  by mouth.    Yes Blayne Bran MD   Nebulizer & Compressor machine Dispense: 1 nebulizer machine w all tubing necessary 10/6/14   Joeya Butter, DO     Allergies   Allergen Reactions    Aspirin Other (comments)     \"messes my stomach up\"      Social History   Substance Use Topics    Smoking status: Current Every Day Smoker     Types: Cigarettes    Smokeless tobacco: Not on file    Alcohol use No      Family History   Problem Relation Age of Onset    Heart Disease Mother         Current Facility-Administered Medications   Medication Dose Route Frequency    potassium chloride (KLOR-CON) packet 20 mEq  20 mEq Oral BID WITH MEALS    levoFLOXacin (LEVAQUIN) tablet 500 mg  500 mg Oral Q24H    predniSONE (DELTASONE) tablet 50 mg  50 mg Oral DAILY WITH LUNCH    albuterol-ipratropium (DUO-NEB) 2.5 MG-0.5 MG/3 ML  3 mL Nebulization Q4H RT    guaiFENesin ER (MUCINEX) tablet 600 mg  600 mg Oral Q12H    senna-docusate (PERICOLACE) 8.6-50 mg per tablet 2 Tab  2 Tab Oral DAILY    roflumilast (DALIRESP) tablet 500 mcg  500 mcg Oral DAILY    montelukast (SINGULAIR) tablet 10 mg  10 mg Oral QHS    acetylcysteine (MUCOMYST) 100 mg/mL (10 %) nebulizer solution 200 mg  2 mL Nebulization TID RT    umeclidinium (INCRUSE ELLIPTA) 62.5 mcg/actuation  1 Puff Inhalation DAILY    nicotine (NICODERM CQ) 21 mg/24 hr patch 1 Patch  1 Patch TransDERmal DAILY    sodium chloride (OCEAN) 0.65 % nasal spray 2 Spray  2 Spray Both Nostrils QID    heparin (porcine) injection 5,000 Units  5,000 Units SubCUTAneous Q8H    insulin lispro (HUMALOG) injection   SubCUTAneous AC&HS    fluticasone-vilanterol (BREO ELLIPTA) 100mcg-25mcg/puff  1 Puff Inhalation DAILY    chlorthalidone (HYGROTEN) tablet 25 mg  25 mg Oral DAILY    doxazosin (CARDURA) tablet 2 mg 2 mg Oral DAILY            Objective:   Vital Signs:    Visit Vitals    /67 (BP 1 Location: Left arm, BP Patient Position: Sitting)    Pulse (!) 101    Temp 97.4 °F (36.3 °C)    Resp 17    Ht 5' 9\" (1.753 m)    Wt 88.5 kg (195 lb 1.7 oz)    SpO2 96%    BMI 28.81 kg/m2       O2 Device: Room air   O2 Flow Rate (L/min): 2 l/min   Temp (24hrs), Av.9 °F (36.6 °C), Min:97.3 °F (36.3 °C), Max:98.5 °F (36.9 °C)       Intake/Output:     Intake/Output Summary (Last 24 hours) at 17 1223  Last data filed at 17 1020   Gross per 24 hour   Intake              600 ml   Output             2775 ml   Net            -2175 ml         Physical Exam:   General: comfortable on nasal cannula  HEENT:  PERRL  Neck: No adenopathy or thyroid swelling  Positive JVD  CVS: S1S2 no murmurs  RS: Mod AE bilaterally, decreased BS with crackles at bases,  Scant exp wheezing. Abd: soft, non tender, no hepatosplenomegaly  Neuro: non focal, awake, alert  Extrm: mild  leg edema   Skin: no rash    Data review:   Labs:  Recent Labs      17   0258  17   0500   WBC  7.0  6.3   HGB  13.0  11.7*   HCT  36.7  34.3*   PLT  237  240     Recent Labs      17   0258  17   0500   NA  135*  137   K  3.2*  4.2   CL  96*  100   CO2  28  26   GLU  139*  144*   BUN  45*  36*   CREA  1.63*  1.41*   CA  8.4*  8.4*   ALB  2.7*   --    SGOT  18   --    ALT  31   --      No results for input(s): PH, PCO2, PO2, HCO3, FIO2 in the last 72 hours. Imaging:  I have personally reviewed the patients radiographs and have reviewed the reports: Allergies        Unspecified: Aspirin       Result Information      Status Provider Status        Final result (Exam End: 3/19/2017 10:23 AM) Open        Study Result      COMPARISON: Chest x-ray dated 2017     INDICATION: Blunt trauma.  Right lower lobe mass.     TECHNIQUE: Axial was performed through the chest without contrast. Coronal and  sagittal reformations were generated. Dose reduction techniques used: Automated  exposure control, adjustment of the mAs and/or kVp according to patient's size,  and iterative reconstruction techniques.     ============     LUNGS and pleura: There is an ovoid peripherally calcified heterogeneous but  predominately hyperattenuating pleural-based mass along the anterior right upper  lobe which measures 2.6 x 1.8 cm transaxial by up to 4.9 cm craniocaudal. This  likely corresponds to the ovoid opacity described on the prior radiographs and  was present dating back to the earliest prior exam from 2008 although there are  progressive dystrophic calcifications.     Additionally, there is a complex loculated pleural fluid collection with  dystrophic calcifications along the periphery as well as internal heterogeneous  high attenuation and scattered calcific debris. The collection measures  approximately 9.1 x 3.7 cm transaxial by approximately 12.1 cm craniocaudal.  Additional scattered calcified pleural plaque is seen extending along the  posterior right upper lobe.     Associated asymmetric volume loss in the right lung. There is mild dependent  pleural thickening inferiorly on the left. There are a few streaky bands of  scarring and atelectasis throughout the right lung.     AIRWAY: Unremarkable.     MEDIASTINUM: Normal heart size. No pericardial effusion. Great vessels are  unremarkable. No thoracic adenopathy.     UPPER ABDOMEN: Scattered calcified granulomas throughout the liver.     OTHER: No aggressive osseous lesions. Exaggerated thoracic kyphosis. Multilevel  spondylosis. The bones are osteopenic.     ============     IMPRESSION  IMPRESSION:        1. Chronic dystrophic pleural-based masslike fluid collections with  heterogeneous complex internal attenuation suggesting sequela of a chronic  granulomatous process. The appearance is atypical for calcified pleural plaques  in the setting of asbestos-related pleural disease.  Findings may represent a  form of a chronic empyema and correspond to the radiographic findings dating  back to at least 2008.              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 ·       RECOMMENDATIONS:   · Continue Ellipita-breo and Incruse,  Dc duonebe scheduled dose  · Prn albuterol  · singulair  · Nicotine patch  · Po prednisone  · Diurese  · No need for further w/u for plaque  · DVT, PUD prophylaxis  · Sign off.              Melissa Mckeon MD

## 2017-03-23 NOTE — PROGRESS NOTES
conducted a Follow up consultation and Spiritual Assessment for Chelle Adler, who is a 66 y. o.,male. The  provided the following Interventions:  Continued the relationship of care and support. Listened empathically. Offered assurance of continued prayer on patient's behalf. Chart reviewed. The following outcomes were achieved:  Patient expressed gratitude for pastoral care visit. Assessment:  There are no further spiritual or Bahai issues which require Spiritual Care Services interventions at this time. Plan:  Chaplains will continue to follow and will provide pastoral care on an as needed/requested basis.  recommends bedside caregivers page  on duty if patient shows signs of acute spiritual or emotional distress. Rev.  [address]  [phone]

## 2017-03-23 NOTE — CDMP QUERY
Dr. Tilda Bumpers,  thank you for  [facility] pulmonary edema with volume overload, resolved\"  Pt being treated with IV Lasix      Please clarify if this patient is being treated/managed for:    =>Acute Mild Pulmonary Edema  =>Chronic Mild Pulmonary Edema  =>Other Explanation of clinical findings  =>Unable to Determine (no explanation of clinical findings)    The medical record reflects the following clinical findings, treatment, and risk factors:      Please clarify and document your clinical opinion in the progress notes and discharge summary. If you DECLINE this query or would like to communicate with [facility], please utilize the \"[facility] message box\" at the TOP of the Progress Note on the right.       Thank you,    Shagufta Villanueva RN  [address] [facility], RN  [phone]

## 2017-03-23 NOTE — PROGRESS NOTES
Problem: Mobility Impaired (Adult and Pediatric)  Goal: *Acute Goals and Plan of Care (Insert Text)  Physical Therapy Goals STG/LTG  Initiated 3/19/2017 and to be accomplished within 7 day(s)  1. Patient will move from supine to sit and sit to supine in bed with modified independence. 2. Patient will transfer from bed to chair and chair to bed with modified independence using the least restrictive device. 3. Patient will perform sit to stand with modified independence. 4. Patient will ambulate with modified independence for >150 feet with the least restrictive device. Outcome: Progressing Towards Goal  PHYSICAL THERAPY TREATMENT     Patient: Valerie Noble (79 y.o. male)  Date: 3/23/2017  Diagnosis: copd exacerbation  copd exacerbation  COPD (chronic obstructive pulmonary disease) ([facility]) COPD (chronic obstructive pulmonary disease) ([facility])  Precautions: Fall   Chart, physical therapy assessment, plan of care and goals were reviewed. ASSESSMENT:  Patient found sitting in b/s chair willing to work with PT. Pt ambulated into hallway with tendency to lean forward during ambulation. Pt's balance is fair. Pt returned to b/s chair post ambulation and left with needs in reach and wife in room. Pt's O2 level drops to 93% at lowest throughout entire tx on room air. Pt's wife states to this LPTA that pt has been using his inhalers and nasal sprays much more frequently than prescribed. She states that he was given an inhaler and it was empty within two days. Education: follow prescribed dosage of medications, gait. Progression toward goals:  [X]      Improving appropriately and progressing toward goals  [ ]      Improving slowly and progressing toward goals  [ ]      Not making progress toward goals and plan of care will be adjusted       PLAN:  Patient continues to benefit from skilled intervention to address the above impairments. Continue treatment per established plan of care.   Discharge Recommendations: Home Health  Further Equipment Recommendations for Discharge:  rolling walker / SPC       SUBJECTIVE:   Patient stated I'm ready to go.       OBJECTIVE DATA SUMMARY:   Critical Behavior:  Neurologic State: Alert  Orientation Level: Oriented X4  Cognition: Follows commands  Safety/Judgement: Fall prevention  Functional Mobility Training:  Transfers:  Sit to Stand: Stand-by asssistance  Balance:  Sitting: Intact  Standing: Impaired; With support  Standing - Static: Good  Standing - Dynamic : Fair  Ambulation/Gait Training:  Distance (ft): 135 Feet (ft)  Assistive Device: Walker, rolling;Gait belt  Ambulation - Level of Assistance: Contact guard assistance  Gait Abnormalities: Decreased step clearance  Base of Support: Center of gravity altered  Speed/Lisbet: Slow;Pace decreased (<100 feet/min)  Step Length: Left shortened;Right shortened  Pain:  Pre tx pain: 0  Post tx pain: 0  Activity Tolerance:   Good  Please refer to the flowsheet for vital signs taken during this treatment.   After treatment:   [X] Patient left in no apparent distress sitting up in chair  [ ] Patient left in no apparent distress in bed  [X] Call bell left within reach  [ ] Nursing notified  [ ] Caregiver present  [ ] Bed alarm activated      Jonny Reynoso PTA   Time Calculation: 12 mins

## 2017-03-23 NOTE — ROUTINE PROCESS
Bedside and Verbal shift change report given to [employer] RN (oncoming nurse) by Kin Barba RN (offgoing nurse). Report included the following information SBAR, Intake/Output, MAR, cardiac rhythm SR and Recent Results.

## 2017-03-24 VITALS
WEIGHT: 189.5 LBS | DIASTOLIC BLOOD PRESSURE: 57 MMHG | TEMPERATURE: 98.2 F | OXYGEN SATURATION: 96 % | SYSTOLIC BLOOD PRESSURE: 127 MMHG | HEIGHT: 69 IN | BODY MASS INDEX: 28.07 KG/M2 | HEART RATE: 83 BPM | RESPIRATION RATE: 18 BRPM

## 2017-03-24 LAB
ALBUMIN SERPL BCP-MCNC: 2.4 G/DL (ref 3.4–5)
ALBUMIN/GLOB SERPL: 0.8 {RATIO} (ref 0.8–1.7)
ALP SERPL-CCNC: 40 U/L (ref 45–117)
ALT SERPL-CCNC: 26 U/L (ref 16–61)
ANION GAP BLD CALC-SCNC: 10 MMOL/L (ref 3–18)
AST SERPL W P-5'-P-CCNC: 12 U/L (ref 15–37)
BASOPHILS # BLD AUTO: 0 K/UL (ref 0–0.1)
BASOPHILS # BLD: 0 % (ref 0–3)
BILIRUB SERPL-MCNC: 0.2 MG/DL (ref 0.2–1)
BUN SERPL-MCNC: 51 MG/DL (ref 7–18)
BUN/CREAT SERPL: 29 (ref 12–20)
CALCIUM SERPL-MCNC: 7.7 MG/DL (ref 8.5–10.1)
CHLORIDE SERPL-SCNC: 96 MMOL/L (ref 100–108)
CO2 SERPL-SCNC: 28 MMOL/L (ref 21–32)
CREAT SERPL-MCNC: 1.74 MG/DL (ref 0.6–1.3)
DIFFERENTIAL METHOD BLD: ABNORMAL
EOSINOPHIL # BLD: 0 K/UL (ref 0–0.4)
EOSINOPHIL NFR BLD: 0 % (ref 0–5)
ERYTHROCYTE [DISTWIDTH] IN BLOOD BY AUTOMATED COUNT: 13.5 % (ref 11.6–14.5)
EST. AVERAGE GLUCOSE BLD GHB EST-MCNC: 134 MG/DL
GLOBULIN SER CALC-MCNC: 3 G/DL (ref 2–4)
GLUCOSE BLD STRIP.AUTO-MCNC: 105 MG/DL (ref 70–110)
GLUCOSE BLD STRIP.AUTO-MCNC: 148 MG/DL (ref 70–110)
GLUCOSE SERPL-MCNC: 120 MG/DL (ref 74–99)
HBA1C MFR BLD: 6.3 % (ref 4.5–5.6)
HCT VFR BLD AUTO: 35.3 % (ref 36–48)
HGB BLD-MCNC: 12.1 G/DL (ref 13–16)
LYMPHOCYTES # BLD AUTO: 7 % (ref 20–51)
LYMPHOCYTES # BLD: 0.6 K/UL (ref 0.8–3.5)
MAGNESIUM SERPL-MCNC: 2.1 MG/DL (ref 1.8–2.4)
MCH RBC QN AUTO: 31.1 PG (ref 24–34)
MCHC RBC AUTO-ENTMCNC: 34.3 G/DL (ref 31–37)
MCV RBC AUTO: 90.7 FL (ref 74–97)
MONOCYTES # BLD: 0.6 K/UL (ref 0–1)
MONOCYTES NFR BLD AUTO: 7 % (ref 2–9)
NEUTS SEG # BLD: 7.5 K/UL (ref 1.8–8)
NEUTS SEG NFR BLD AUTO: 84 % (ref 42–75)
PLATELET # BLD AUTO: 247 K/UL (ref 135–420)
PLATELET COMMENTS,PCOM: ABNORMAL
PMV BLD AUTO: 8.8 FL (ref 9.2–11.8)
POTASSIUM SERPL-SCNC: 3.3 MMOL/L (ref 3.5–5.5)
PROMYELOCYTES NFR BLD MANUAL: 2 %
PROT SERPL-MCNC: 5.4 G/DL (ref 6.4–8.2)
RBC # BLD AUTO: 3.89 M/UL (ref 4.7–5.5)
RBC MORPH BLD: ABNORMAL
SODIUM SERPL-SCNC: 134 MMOL/L (ref 136–145)
WBC # BLD AUTO: 8.9 K/UL (ref 4.6–13.2)

## 2017-03-24 PROCEDURE — 80053 COMPREHEN METABOLIC PANEL: CPT | Performed by: INTERNAL MEDICINE

## 2017-03-24 PROCEDURE — 74011250637 HC RX REV CODE- 250/637: Performed by: FAMILY MEDICINE

## 2017-03-24 PROCEDURE — 74011250637 HC RX REV CODE- 250/637: Performed by: INTERNAL MEDICINE

## 2017-03-24 PROCEDURE — 74011250636 HC RX REV CODE- 250/636: Performed by: INTERNAL MEDICINE

## 2017-03-24 PROCEDURE — 82962 GLUCOSE BLOOD TEST: CPT

## 2017-03-24 PROCEDURE — 83735 ASSAY OF MAGNESIUM: CPT | Performed by: INTERNAL MEDICINE

## 2017-03-24 PROCEDURE — 94640 AIRWAY INHALATION TREATMENT: CPT

## 2017-03-24 PROCEDURE — 74011000250 HC RX REV CODE- 250: Performed by: INTERNAL MEDICINE

## 2017-03-24 PROCEDURE — 94760 N-INVAS EAR/PLS OXIMETRY 1: CPT

## 2017-03-24 PROCEDURE — 83036 HEMOGLOBIN GLYCOSYLATED A1C: CPT | Performed by: INTERNAL MEDICINE

## 2017-03-24 PROCEDURE — 74011636637 HC RX REV CODE- 636/637: Performed by: FAMILY MEDICINE

## 2017-03-24 PROCEDURE — 36415 COLL VENOUS BLD VENIPUNCTURE: CPT | Performed by: INTERNAL MEDICINE

## 2017-03-24 PROCEDURE — 85025 COMPLETE CBC W/AUTO DIFF WBC: CPT | Performed by: INTERNAL MEDICINE

## 2017-03-24 RX ORDER — POTASSIUM CHLORIDE 20 MEQ/1
40 TABLET, EXTENDED RELEASE ORAL
Status: COMPLETED | OUTPATIENT
Start: 2017-03-24 | End: 2017-03-24

## 2017-03-24 RX ORDER — MONTELUKAST SODIUM 10 MG/1
10 TABLET ORAL
Qty: 30 TAB | Refills: 0 | Status: SHIPPED | OUTPATIENT
Start: 2017-03-24 | End: 2018-04-20

## 2017-03-24 RX ORDER — GUAIFENESIN 600 MG/1
600 TABLET, EXTENDED RELEASE ORAL EVERY 12 HOURS
Qty: 60 TAB | Refills: 0 | Status: SHIPPED | OUTPATIENT
Start: 2017-03-24 | End: 2018-04-20

## 2017-03-24 RX ORDER — LEVOFLOXACIN 500 MG/1
500 TABLET, FILM COATED ORAL EVERY 24 HOURS
Qty: 3 TAB | Refills: 0 | Status: SHIPPED | OUTPATIENT
Start: 2017-03-24 | End: 2018-04-20

## 2017-03-24 RX ORDER — PREDNISONE 50 MG/1
50 TABLET ORAL
Qty: 5 TAB | Refills: 0 | Status: SHIPPED | OUTPATIENT
Start: 2017-03-24 | End: 2018-04-23

## 2017-03-24 RX ADMIN — PREDNISONE 50 MG: 20 TABLET ORAL at 12:41

## 2017-03-24 RX ADMIN — CHLORTHALIDONE 25 MG: 25 TABLET ORAL at 08:37

## 2017-03-24 RX ADMIN — HEPARIN SODIUM 5000 UNITS: 5000 INJECTION, SOLUTION INTRAVENOUS; SUBCUTANEOUS at 12:42

## 2017-03-24 RX ADMIN — HEPARIN SODIUM 5000 UNITS: 5000 INJECTION, SOLUTION INTRAVENOUS; SUBCUTANEOUS at 05:19

## 2017-03-24 RX ADMIN — ACETYLCYSTEINE 200 MG: 100 SOLUTION ORAL; RESPIRATORY (INHALATION) at 07:13

## 2017-03-24 RX ADMIN — DOCUSATE SODIUM AND SENNOSIDES 2 TABLET: 8.6; 5 TABLET, FILM COATED ORAL at 08:37

## 2017-03-24 RX ADMIN — UMECLIDINIUM 1 PUFF: 62.5 AEROSOL, POWDER ORAL at 08:39

## 2017-03-24 RX ADMIN — FLUTICASONE FUROATE AND VILANTEROL TRIFENATATE 1 PUFF: 100; 25 POWDER RESPIRATORY (INHALATION) at 08:38

## 2017-03-24 RX ADMIN — DOXAZOSIN 2 MG: 1 TABLET ORAL at 08:37

## 2017-03-24 RX ADMIN — Medication 2 SPRAY: at 08:39

## 2017-03-24 RX ADMIN — Medication 2 SPRAY: at 12:42

## 2017-03-24 RX ADMIN — POTASSIUM CHLORIDE 20 MEQ: 1.5 POWDER, FOR SOLUTION ORAL at 08:37

## 2017-03-24 RX ADMIN — ROFLUMILAST 500 MCG: 500 TABLET ORAL at 08:37

## 2017-03-24 RX ADMIN — ALBUTEROL SULFATE 2.5 MG: 2.5 SOLUTION RESPIRATORY (INHALATION) at 07:13

## 2017-03-24 RX ADMIN — GUAIFENESIN 600 MG: 600 TABLET, EXTENDED RELEASE ORAL at 08:37

## 2017-03-24 RX ADMIN — POTASSIUM CHLORIDE 40 MEQ: 20 TABLET, EXTENDED RELEASE ORAL at 09:32

## 2017-03-24 NOTE — ROUTINE PROCESS
Bedside and Verbal shift change report given to Johana Galindo RN (oncoming nurse) by Sayra Spivey RN (offgoing nurse). Report included the following information SBAR and Kardex.

## 2017-03-24 NOTE — DISCHARGE SUMMARY
Discharge Summary    Patient: Chriss Lowery MRN: [id number]  CSN: [id number]    YOB: 1945  Age: 66 y.o. Sex: male    DOA: 3/18/2017 LOS:  LOS: 5 days   Discharge Date:      Primary Care Provider:  Lolis Velazco MD    Admission Diagnoses: copd exacerbation  copd exacerbation  COPD (chronic obstructive pulmonary disease) ([facility]    Discharge Diagnoses:    Problem List as of 3/24/2017  Date Reviewed: 3/18/2017          Codes Class Noted - Resolved    Tobacco abuse (Chronic) ICD-10-CM: Z72.0  ICD-9-CM: 305.1  3/22/2017 - Present        * (Principal)COPD (chronic obstructive pulmonary disease) ([facility] ICD-10-CM: J44.9  ICD-9-CM: 496  10/2/2014 - Present        URIEL (acute kidney injury) ([facility] ICD-10-CM: N17.9  ICD-9-CM: 584.9  10/2/2014 - Present        Hypertension ICD-10-CM: I10  ICD-9-CM: 401.9  Unknown - Present        RESOLVED: Acute respiratory failure ([facility] ICD-10-CM: J96.00  ICD-9-CM: 518.81  10/2/2014 - 3/18/2017        RESOLVED: Pneumonia ICD-10-CM: J18.9  ICD-9-CM: 437  10/2/2014 - 3/18/2017              Discharge Medications:     Current Discharge Medication List      START taking these medications    Details   guaiFENesin ER (MUCINEX) 600 mg ER tablet Take 1 Tab by mouth every twelve (12) hours. Qty: 60 Tab, Refills: 0      levoFLOXacin (LEVAQUIN) 500 mg tablet Take 1 Tab by mouth every twenty-four (24) hours. Qty: 3 Tab, Refills: 0      montelukast (SINGULAIR) 10 mg tablet Take 1 Tab by mouth nightly. Qty: 30 Tab, Refills: 0      roflumilast (DALIRESP) tab tablet Take 1 Tab by mouth daily. Qty: 30 Tab, Refills: 0      predniSONE (DELTASONE) 50 mg tablet Take 1 Tab by mouth daily (with lunch). Qty: 5 Tab, Refills: 0      umeclidinium (INCRUSE ELLIPTA) 62.5 mcg/actuation inhaler Take 1 Puff by inhalation daily.   Qty: 1 Inhaler, Refills: 0         CONTINUE these medications which have NOT CHANGED    Details   albuterol (PROVENTIL VENTOLIN) 2.5 mg /3 mL (0.083 %) nebulizer solution 3 mL by Nebulization route every four (4) hours as needed for Wheezing. Qty: 24 Each, Refills: 0      budesonide-formoterol (SYMBICORT) 160-4.5 mcg/actuation HFA inhaler Take 2 Puffs by inhalation two (2) times a day. Qty: 1 Inhaler, Refills: 0      chlorthalidone (HYGROTEN) 25 mg tablet Take  by mouth daily. albuterol (PROVENTIL HFA, VENTOLIN HFA, PROAIR HFA) 90 mcg/actuation inhaler Take 2 Puffs by inhalation every four (4) hours as needed for Wheezing or Shortness of Breath. Qty: 1 Inhaler, Refills: 1      DOXAZOSIN MESYLATE (CARDURA PO) Take  by mouth. Nebulizer & Compressor machine Dispense: 1 nebulizer machine w all tubing necessary  Qty: 1 each, Refills: 0         STOP taking these medications       lisinopril (PRINIVIL, ZESTRIL) 40 mg tablet Comments:   Reason for Stopping:               Discharge Condition: Good    Procedures : none    Consults: Pulmonary/Critical Care      PHYSICAL EXAM   Visit Vitals    /57 (BP 1 Location: Left arm, BP Patient Position: Sitting)    Pulse 83    Temp 98.2 °F (36.8 °C)    Resp 18    Ht 5' 9\" (1.753 m)    Wt 86 kg (189 lb 8 oz)    SpO2 96%    BMI 27.98 kg/m2     General: Awake, cooperative, no acute distress    HEENT: NC, Atraumatic. PERRLA, EOMI. Anicteric sclerae. Lungs:  Mild end expiratory wheezing, no Rhonchi/Rales. Heart:  Regular  rhythm,  No murmur, No Rubs, No Gallops  Abdomen: Soft, Non distended, Non tender. +Bowel sounds,   Extremities: No c/c/e  Psych:   Not anxious or agitated. Neurologic:  No acute neurological deficits. Admission HPI : CC: shortness of breath  Clarence Rey is a 66 y.o. male with past medical history significant for COPD, HTn, prostate ca presents to  ER w 3 days of worsening shortness of breath and cough. stared w nasal congestion but no sputum producible. No alleviating or aggravating factors. No fever, chills.  Denies pain, palpitations.     On presentation to the ER he was afebrile, tachycaridc satting in mid 90s while on supplemental oxygen. He had expiratory wheezes diffusely that persisted despite neb treatments. CXR w R LL scarring unchanged from prior. EKG with out acute finding, cr mildly elevated. Medicine is asked to admit for further management. Hospital Course :     COPD exacerbation:  Patient admitted to telemetry for IV steroids, antibiotics and scheduled nebulizer treatments. Upon pulmonary evaluation it was recommended that he continue Breo and Incruse with albuterol as needed and singular. On the day of discharge the patient endorsed significant improvement in resolution of shortness of breath. The patient was then discharged home and improved condition. Renal insufficiency:  On admission the patient's ace inhibitor was initially held for renal disease. On the day of discharge the patient's creatinine was stable but not significantly improved. Continue to hold upon discharge. The patient was advised to follow up with his primary care doctor within seven days of discharge for follow-up of these and other issues. Activity: Activity as tolerated    Diet: Cardiac Diet    Follow-up: with primary care < 7 days    Disposition: home     Minutes spent on discharge: 35       Labs: Results:       Chemistry Recent Labs      03/24/17   0203  03/23/17   0258   GLU  120*  139*   NA  134*  135*   K  3.3*  3.2*   CL  96*  96*   CO2  28  28   BUN  51*  45*   CREA  1.74*  1.63*   CA  7.7*  8.4*   AGAP  10  11   BUCR  29*  28*   AP  40*  48   TP  5.4*  6.0*   ALB  2.4*  2.7*   GLOB  3.0  3.3   AGRAT  0.8  0.8      CBC w/Diff Recent Labs      03/24/17   0203  03/23/17   0258   WBC  8.9  7.0   RBC  3.89*  4.14*   HGB  12.1*  13.0   HCT  35.3*  36.7   PLT  247  237   GRANS  84*  89*   LYMPH  7*  10*   EOS  0  0      Cardiac Enzymes No results for input(s): CPK, CKND1, WILLARD in the last 72 hours.     No lab exists for component: CKRMB, TROIP   Coagulation No results for input(s): PTP, INR, APTT in the last 72 hours. No lab exists for component: INREXT    Lipid Panel No results found for: CHOL, CHOLPOCT, CHOLX, CHLST, CHOLV, [id number], HDL, LDL, NLDLCT, DLDL, LDLC, DLDLP, [id number], VLDLC, VLDL, TGL, TGLX, TRIGL, [id number], TRIGP, TGLPOCT, [id number], CHHD, CHHDX   BNP No results for input(s): BNPP in the last 72 hours. Liver Enzymes Recent Labs      03/24/17   0203   TP  5.4*   ALB  2.4*   AP  40*   SGOT  12*      Thyroid Studies No results found for: T4, T3U, TSH, TSHEXT         Significant Diagnostic Studies: Ct Chest Wo Cont    Result Date: 3/19/2017  COMPARISON: Chest x-ray dated March 18, 2017 INDICATION: Blunt trauma. Right lower lobe mass. TECHNIQUE: Axial was performed through the chest without contrast.  Coronal and sagittal reformations were generated. Dose reduction techniques used: Automated exposure control, adjustment of the mAs and/or kVp according to patient's size, and iterative reconstruction techniques. ============ LUNGS and pleura: There is an ovoid peripherally calcified heterogeneous but predominately hyperattenuating pleural-based mass along the anterior right upper lobe which measures 2.6 x 1.8 cm transaxial by up to 4.9 cm craniocaudal. This likely corresponds to the ovoid opacity described on the prior radiographs and was present dating back to the earliest prior exam from 2008 although there are progressive dystrophic calcifications. Additionally, there is a complex loculated pleural fluid collection with dystrophic calcifications along the periphery as well as internal heterogeneous high attenuation and scattered calcific debris. The collection measures approximately 9.1 x 3.7 cm transaxial by approximately 12.1 cm craniocaudal. Additional scattered calcified pleural plaque is seen extending along the posterior right upper lobe. Associated asymmetric volume loss in the right lung. There is mild dependent pleural thickening inferiorly on the left.  There are a few streaky bands of scarring and atelectasis throughout the right lung. AIRWAY: Unremarkable. MEDIASTINUM: Normal heart size. No pericardial effusion. Great vessels are unremarkable. No thoracic adenopathy. UPPER ABDOMEN: Scattered calcified granulomas throughout the liver. OTHER: No aggressive osseous lesions. Exaggerated thoracic kyphosis. Multilevel spondylosis. The bones are osteopenic. ============     IMPRESSION: 1. Chronic dystrophic pleural-based masslike fluid collections with heterogeneous complex internal attenuation suggesting sequela of a chronic granulomatous process. The appearance is atypical for calcified pleural plaques in the setting of asbestos-related pleural disease. Findings may represent a form of a chronic empyema and correspond to the radiographic findings dating back to at least 2008. Xr Chest Port    Result Date: 3/19/2017  Chest, single view Indication: Chest pain, shortness of breath Comparison: December 23, 2015 Findings:  Portable upright AP view of the chest was obtained. Persistent asymmetric volume loss in the right hemithorax with hazy opacity in the mid to lower lung zone as well as an ovoid opacity in the right midlung. Appearance is unchanged. Heart size is within normal limits. Left lung remains well expanded and clear. No pleural effusion nor pneumothorax. No acute osseous abnormality. Impression: No significant interval change. Chronic findings in the right hemithorax as above. No results found for this or any previous visit.         CC: Cassidy Bauman MD

## 2017-03-24 NOTE — PROGRESS NOTES
0031-Resting in bed, eyes closed, arouses easily. Denies pain at this time. Denies numbness and tingling to extremities. Hard of hearing, no hearing aids. Tele-box 31 in place. Stable. Call light within reach. 0408-No change from initial assessment. Stable. Resting quietly. Denies pian. No complaints. 0600-Stable. No complaints voice. Resting in bed. Denies pain. Shift Summary:  Uneventful shift. Rested quietly throughout shift.

## 2017-03-24 NOTE — CDMP QUERY
Dr. Lucy Urrutia,  thank you for  [facility] pulmonary edema with volume overload, resolved\"  Pt being treated with IV Lasix      Please clarify if this patient is being treated/managed for:    =>Acute Mild Pulmonary Edema  =>Chronic Mild Pulmonary Edema  =>Other Explanation of clinical findings  =>Unable to Determine (no explanation of clinical findings)    The medical record reflects the following clinical findings, treatment, and risk factors:      Please clarify and document your clinical opinion in the progress notes and discharge summary. If you DECLINE this query or would like to communicate with [facility], please utilize the \"[facility] message box\" at the TOP of the Progress Note on the right.       Thank you,    Laura Avitia RN  [address] [place] [address], RN  [phone]

## 2017-03-24 NOTE — DISCHARGE INSTRUCTIONS
DISCHARGE SUMMARY from Nurse    The following personal items are in your possession at time of discharge:    Dental Appliances: None  Visual Aid: Magnifying glass, At bedside  Hearing Aids/Status: At home  Home Medications: None  Jewelry: None  Clothing: Footwear, Pants, Socks, Undergarments  Other Valuables: None             PATIENT INSTRUCTIONS:    After general anesthesia or intravenous sedation, for 24 hours or while taking prescription Narcotics:  · Limit your activities  · Do not drive and operate hazardous machinery  · Do not make important personal or business decisions  · Do  not drink alcoholic beverages  · If you have not urinated within 8 hours after discharge, please contact your surgeon on call. Report the following to your surgeon:  · Excessive pain, swelling, redness or odor of or around the surgical area  · Temperature over 100.5  · Nausea and vomiting lasting longer than 4 hours or if unable to take medications  · Any signs of decreased circulation or nerve impairment to extremity: change in color, persistent  numbness, tingling, coldness or increase pain  · Any questions        What to do at Home:  Recommended activity: Activity as tolerated,    If you experience any of the following symptoms  Shortness of breath with minimal exertion. please follow up with PCP. *  Please give a list of your current medications to your Primary Care Provider. *  Please update this list whenever your medications are discontinued, doses are      changed, or new medications (including over-the-counter products) are added. *  Please carry medication information at all times in case of emergency situations. These are general instructions for a healthy lifestyle:    No smoking/ No tobacco products/ Avoid exposure to second hand smoke    Surgeon General's Warning:  Quitting smoking now greatly reduces serious risk to your health.     Obesity, smoking, and sedentary lifestyle greatly increases your risk for illness    A healthy diet, regular physical exercise & weight monitoring are important for maintaining a healthy lifestyle    You may be retaining fluid if you have a history of heart failure or if you experience any of the following symptoms:  Weight gain of 3 pounds or more overnight or 5 pounds in a week, increased swelling in our hands or feet or shortness of breath while lying flat in bed. Please call your doctor as soon as you notice any of these symptoms; do not wait until your next office visit. Recognize signs and symptoms of STROKE:    F-face looks uneven    A-arms unable to move or move unevenly    S-speech slurred or non-existent    T-time-call 911 as soon as signs and symptoms begin-DO NOT go       Back to bed or wait to see if you get better-TIME IS BRAIN. Warning Signs of HEART ATTACK     Call 911 if you have these symptoms:   Chest discomfort. Most heart attacks involve discomfort in the center of the chest that lasts more than a few minutes, or that goes away and comes back. It can feel like uncomfortable pressure, squeezing, fullness, or pain.  Discomfort in other areas of the upper body. Symptoms can include pain or discomfort in one or both arms, the back, neck, jaw, or stomach.  Shortness of breath with or without chest discomfort.  Other signs may include breaking out in a cold sweat, nausea, or lightheadedness. Don't wait more than five minutes to call 911 - MINUTES MATTER! Fast action can save your life. Calling 911 is almost always the fastest way to get lifesaving treatment. Emergency Medical Services staff can begin treatment when they arrive -- up to an hour sooner than if someone gets to the hospital by car. The discharge information has been reviewed with the patient and spouse. The patient and spouse verbalized understanding.     Discharge medications reviewed with the patient and spouse and appropriate educational materials and side effects teaching were provided.       Patient armband removed and shredded

## 2017-03-24 NOTE — ROUTINE PROCESS
Walking rounds with off shift nurse. Pt up at the edge of the bed. Pleasant. Had BF. Denies any issues. [place] noted. Able to read lips. On RA. NO sob . Will continue to monitor. 1200 pm- d/c to home. Wife to come in ta 1 pm.   UP IN G/C . Lunch served. No issues noted. 1329 PM Dual AVS reviewed with JASWANT Ramos. All medications reviewed individually with patient. Opportunities for questions and concerns provided. Patient discharged via (mode of transport ie. Car, ambulance or air transport) car. Patient's arm band appropriately discarded. D/c instructions given. Agreed with f/u appointments. Medication changes. Bedside inhaler given. On street clothes . Escorted via w/c. In good spirit.

## 2017-03-24 NOTE — PROGRESS NOTES
Problem: Mobility Impaired (Adult and Pediatric)  Goal: *Acute Goals and Plan of Care (Insert Text)  Physical Therapy Goals STG/LTG  Initiated 3/19/2017 and to be accomplished within 7 day(s)  1. Patient will move from supine to sit and sit to supine in bed with modified independence. 2. Patient will transfer from bed to chair and chair to bed with modified independence using the least restrictive device. 3. Patient will perform sit to stand with modified independence. 4. Patient will ambulate with modified independence for >150 feet with the least restrictive device. Attempted PT treatment session. Patient declined PT stating, \"I'm leaving in a few hours so I don't think I'll need to work with PT today. \" No discharge order noted or reported by nursing staff. Will follow up later pending further information and should the patient agree to participate.      Jayden Fregoso PT

## 2017-03-24 NOTE — CDMP QUERY
Please clarify if this patient is being treated/managed for:    =>URIEL  =>Other Explanation of clinical findings  =>Unable to Determine (no explanation of clinical findings)    Based on the current documentation in the patient record, the diagnosis of Acute Renal Failure/URIEL may not be clinically supported. Please provide documentation to support this diagnosis or rule out this diagnosis. A diagnosis of Acute Renal Failure/URIEL is documented in progress notes for 3/18-3/22. PN on 8/22- URIEL  - Improving slowly  - Judicious diuresis/fluid management  The record reflects:    10/6/2014 Creatinine: 1.51   5/19/2015 Creatinine: 1.71   12/23/2015 Creatinine: 1.51  Current Admission:   3/18/2017 Creatinine: 1.67   3/19/2017 Creatinine: 1.84   3/20/2017 Creatinine: 1.51   3/21/2017 Creatinine: 1.41   GFR this admission 36-49  Urine output this admission > 0.5mL/kg/hr    Adult RIFLE Criteria  RIFLE is an acronym of Risk, Injury, Failure, Loss, and End-stage kidney disease. Patients can be classified either by GFR criteria or by UO criteria    At Risk=  SCr increased 1.5-2x baseline or GFR decreased >25% or UO<0.5mL/kg/h < 6 hrs   Kidney Injury=  SCr increased 2-3x baseline or GFR decreased >50% or UO <0.5mL/kg/h > 12 hrs     Please clarify and document your clinical opinion in the progress notes and discharge summary including the definitive and/or presumptive diagnosis, (suspected or probable), related to the above clinical findings. Please include clinical findings supporting your diagnosis. If you DECLINE this query or would like to communicate with [facility], please utilize the \"[facility] message box\" at the TOP of the Progress Note on the right.       Thank you,    Huma Schaeffer, RN  [phone]  Laverne Temple RN [address] [place] [address] [phone]

## 2017-03-24 NOTE — ROUTINE PROCESS
Bedside and Verbal shift change report given to Fifi Moore RN (oncoming nurse) by [place]. Laz Mooney RN (offgoing nurse). Report included the following information [employer].

## 2017-04-11 NOTE — CDMP QUERY
PT STATES PAIN TO FLANK IS 0/10. PT WATCHING TV. NO DISTRESS. CALL LIGHT
WITHIN REACH Please clarify if this patient is being treated/managed for:    =>URIEL  =>Other Explanation of clinical findings  =>Unable to Determine (no explanation of clinical findings)    Based on the current documentation in the patient record, the diagnosis of Acute Renal Failure/URIEL may not be clinically supported. Please provide documentation to support this diagnosis or rule out this diagnosis. A diagnosis of Acute Renal Failure/URIEL is documented in progress notes for 3/18-3/22. The record reflects:    10/6/2014 Creatinine: 1.51   5/19/2015 Creatinine: 1.71   12/23/2015 Creatinine: 1.51  Current Admission:   3/18/2017 Creatinine: 1.67   3/19/2017 Creatinine: 1.84   3/20/2017 Creatinine: 1.51   3/21/2017 Creatinine: 1.41   GFR this admission 36-49  Urine output this admission > 0.5mL/kg/hr    Adult RIFLE Criteria  RIFLE is an acronym of Risk, Injury, Failure, Loss, and End-stage kidney disease. Patients can be classified either by GFR criteria or by UO criteria    At Risk=  SCr increased 1.5-2x baseline or GFR decreased >25% or UO<0.5mL/kg/h < 6 hrs   Kidney Injury=  SCr increased 2-3x baseline or GFR decreased >50% or UO <0.5mL/kg/h > 12 hrs     Please clarify and document your clinical opinion in the progress notes and discharge summary including the definitive and/or presumptive diagnosis, (suspected or probable), related to the above clinical findings. Please include clinical findings supporting your diagnosis. If you DECLINE this query or would like to communicate with [facility], please utilize the \"[facility] message box\" at the TOP of the Progress Note on the right.       Thank you,    Neel Solares, RN  [phone]  Jaleel Dunbar  [address] [phone]

## 2018-04-20 ENCOUNTER — HOSPITAL ENCOUNTER (INPATIENT)
Age: 73
LOS: 3 days | Discharge: HOME | DRG: 191 | End: 2018-04-23
Attending: EMERGENCY MEDICINE | Admitting: INTERNAL MEDICINE
Payer: MEDICARE

## 2018-04-20 ENCOUNTER — APPOINTMENT (OUTPATIENT)
Dept: GENERAL RADIOLOGY | Age: 73
DRG: 191 | End: 2018-04-20
Attending: EMERGENCY MEDICINE
Payer: MEDICARE

## 2018-04-20 DIAGNOSIS — J45.902 STATUS ASTHMATICUS WITH COPD (CHRONIC OBSTRUCTIVE PULMONARY DISEASE) (HCC): ICD-10-CM

## 2018-04-20 DIAGNOSIS — J44.1 ACUTE EXACERBATION OF CHRONIC OBSTRUCTIVE PULMONARY DISEASE (COPD) (HCC): Primary | ICD-10-CM

## 2018-04-20 DIAGNOSIS — R06.09 DYSPNEA ON EXERTION: ICD-10-CM

## 2018-04-20 DIAGNOSIS — F17.200 TOBACCO USE DISORDER: ICD-10-CM

## 2018-04-20 DIAGNOSIS — J44.9 STATUS ASTHMATICUS WITH COPD (CHRONIC OBSTRUCTIVE PULMONARY DISEASE) (HCC): ICD-10-CM

## 2018-04-20 PROBLEM — I49.3 PVC'S (PREMATURE VENTRICULAR CONTRACTIONS): Status: ACTIVE | Noted: 2018-04-20

## 2018-04-20 LAB
ALBUMIN SERPL-MCNC: 3 G/DL (ref 3.4–5)
ALBUMIN/GLOB SERPL: 0.8 {RATIO} (ref 0.8–1.7)
ALP SERPL-CCNC: 74 U/L (ref 45–117)
ALT SERPL-CCNC: 19 U/L (ref 16–61)
ANION GAP SERPL CALC-SCNC: 12 MMOL/L (ref 3–18)
ARTERIAL PATENCY WRIST A: YES
AST SERPL-CCNC: 14 U/L (ref 15–37)
BASE DEFICIT BLD-SCNC: 2 MMOL/L
BASOPHILS # BLD: 0.1 K/UL (ref 0–0.06)
BASOPHILS NFR BLD: 1 % (ref 0–2)
BDY SITE: ABNORMAL
BILIRUB SERPL-MCNC: 0.4 MG/DL (ref 0.2–1)
BNP SERPL-MCNC: 80 PG/ML (ref 0–900)
BUN SERPL-MCNC: 16 MG/DL (ref 7–18)
BUN/CREAT SERPL: 11 (ref 12–20)
CALCIUM SERPL-MCNC: 9 MG/DL (ref 8.5–10.1)
CHLORIDE SERPL-SCNC: 97 MMOL/L (ref 100–108)
CK MB CFR SERPL CALC: 4 % (ref 0–4)
CK MB SERPL-MCNC: 4.5 NG/ML (ref 5–25)
CK SERPL-CCNC: 112 U/L (ref 39–308)
CO2 SERPL-SCNC: 26 MMOL/L (ref 21–32)
CREAT SERPL-MCNC: 1.51 MG/DL (ref 0.6–1.3)
DIFFERENTIAL METHOD BLD: ABNORMAL
EOSINOPHIL # BLD: 0.9 K/UL (ref 0–0.4)
EOSINOPHIL NFR BLD: 10 % (ref 0–5)
ERYTHROCYTE [DISTWIDTH] IN BLOOD BY AUTOMATED COUNT: 13.2 % (ref 11.6–14.5)
GAS FLOW.O2 O2 DELIVERY SYS: ABNORMAL L/MIN
GLOBULIN SER CALC-MCNC: 3.6 G/DL (ref 2–4)
GLUCOSE SERPL-MCNC: 123 MG/DL (ref 74–99)
HCO3 BLD-SCNC: 22.8 MMOL/L (ref 22–26)
HCT VFR BLD AUTO: 34 % (ref 36–48)
HGB BLD-MCNC: 11.4 G/DL (ref 13–16)
LYMPHOCYTES # BLD: 0.9 K/UL (ref 0.9–3.6)
LYMPHOCYTES NFR BLD: 10 % (ref 21–52)
MAGNESIUM SERPL-MCNC: 1.7 MG/DL (ref 1.6–2.6)
MCH RBC QN AUTO: 29.6 PG (ref 24–34)
MCHC RBC AUTO-ENTMCNC: 33.5 G/DL (ref 31–37)
MCV RBC AUTO: 88.3 FL (ref 74–97)
MONOCYTES # BLD: 0.7 K/UL (ref 0.05–1.2)
MONOCYTES NFR BLD: 7 % (ref 3–10)
NEUTS SEG # BLD: 6.9 K/UL (ref 1.8–8)
NEUTS SEG NFR BLD: 72 % (ref 40–73)
O2/TOTAL GAS SETTING VFR VENT: 21 %
PCO2 BLD: 36.4 MMHG (ref 35–45)
PH BLD: 7.41 [PH] (ref 7.35–7.45)
PLATELET # BLD AUTO: 285 K/UL (ref 135–420)
PMV BLD AUTO: 9 FL (ref 9.2–11.8)
PO2 BLD: 78 MMHG (ref 80–100)
POTASSIUM SERPL-SCNC: 3.5 MMOL/L (ref 3.5–5.5)
PROT SERPL-MCNC: 6.6 G/DL (ref 6.4–8.2)
RBC # BLD AUTO: 3.85 M/UL (ref 4.7–5.5)
SAO2 % BLD: 96 % (ref 92–97)
SERVICE CMNT-IMP: ABNORMAL
SODIUM SERPL-SCNC: 135 MMOL/L (ref 136–145)
SPECIMEN TYPE: ABNORMAL
TOTAL RESP. RATE, ITRR: 23
TROPONIN I SERPL-MCNC: <0.02 NG/ML (ref 0–0.06)
WBC # BLD AUTO: 9.4 K/UL (ref 4.6–13.2)

## 2018-04-20 PROCEDURE — 74011250636 HC RX REV CODE- 250/636: Performed by: INTERNAL MEDICINE

## 2018-04-20 PROCEDURE — 77030013140 HC MSK NEB VYRM -A

## 2018-04-20 PROCEDURE — 83036 HEMOGLOBIN GLYCOSYLATED A1C: CPT | Performed by: INTERNAL MEDICINE

## 2018-04-20 PROCEDURE — 87186 SC STD MICRODIL/AGAR DIL: CPT | Performed by: INTERNAL MEDICINE

## 2018-04-20 PROCEDURE — 65660000000 HC RM CCU STEPDOWN

## 2018-04-20 PROCEDURE — 83880 ASSAY OF NATRIURETIC PEPTIDE: CPT | Performed by: EMERGENCY MEDICINE

## 2018-04-20 PROCEDURE — 84484 ASSAY OF TROPONIN QUANT: CPT | Performed by: EMERGENCY MEDICINE

## 2018-04-20 PROCEDURE — 99285 EMERGENCY DEPT VISIT HI MDM: CPT

## 2018-04-20 PROCEDURE — 87077 CULTURE AEROBIC IDENTIFY: CPT | Performed by: INTERNAL MEDICINE

## 2018-04-20 PROCEDURE — 83735 ASSAY OF MAGNESIUM: CPT | Performed by: EMERGENCY MEDICINE

## 2018-04-20 PROCEDURE — 74011000250 HC RX REV CODE- 250: Performed by: INTERNAL MEDICINE

## 2018-04-20 PROCEDURE — 93005 ELECTROCARDIOGRAM TRACING: CPT

## 2018-04-20 PROCEDURE — 80053 COMPREHEN METABOLIC PANEL: CPT | Performed by: EMERGENCY MEDICINE

## 2018-04-20 PROCEDURE — 74011250637 HC RX REV CODE- 250/637: Performed by: INTERNAL MEDICINE

## 2018-04-20 PROCEDURE — 82803 BLOOD GASES ANY COMBINATION: CPT

## 2018-04-20 PROCEDURE — 74011000250 HC RX REV CODE- 250: Performed by: EMERGENCY MEDICINE

## 2018-04-20 PROCEDURE — 71045 X-RAY EXAM CHEST 1 VIEW: CPT

## 2018-04-20 PROCEDURE — 36600 WITHDRAWAL OF ARTERIAL BLOOD: CPT

## 2018-04-20 PROCEDURE — 94640 AIRWAY INHALATION TREATMENT: CPT

## 2018-04-20 PROCEDURE — 74011250636 HC RX REV CODE- 250/636: Performed by: EMERGENCY MEDICINE

## 2018-04-20 PROCEDURE — 87070 CULTURE OTHR SPECIMN AEROBIC: CPT | Performed by: INTERNAL MEDICINE

## 2018-04-20 PROCEDURE — 85025 COMPLETE CBC W/AUTO DIFF WBC: CPT | Performed by: EMERGENCY MEDICINE

## 2018-04-20 PROCEDURE — 96374 THER/PROPH/DIAG INJ IV PUSH: CPT

## 2018-04-20 RX ORDER — CHLORTHALIDONE 25 MG/1
25 TABLET ORAL DAILY
Status: DISCONTINUED | OUTPATIENT
Start: 2018-04-21 | End: 2018-04-23 | Stop reason: HOSPADM

## 2018-04-20 RX ORDER — HYDROCODONE POLISTIREX AND CHLORPHENIRAMINE POLISTIREX 10; 8 MG/5ML; MG/5ML
5 SUSPENSION, EXTENDED RELEASE ORAL
Status: DISCONTINUED | OUTPATIENT
Start: 2018-04-20 | End: 2018-04-23 | Stop reason: HOSPADM

## 2018-04-20 RX ORDER — ALBUTEROL SULFATE 2.5 MG/.5ML
5 SOLUTION RESPIRATORY (INHALATION)
Status: COMPLETED | OUTPATIENT
Start: 2018-04-20 | End: 2018-04-20

## 2018-04-20 RX ORDER — IPRATROPIUM BROMIDE AND ALBUTEROL SULFATE 2.5; .5 MG/3ML; MG/3ML
3 SOLUTION RESPIRATORY (INHALATION)
Status: COMPLETED | OUTPATIENT
Start: 2018-04-20 | End: 2018-04-20

## 2018-04-20 RX ORDER — SODIUM CHLORIDE 0.9 % (FLUSH) 0.9 %
5-10 SYRINGE (ML) INJECTION EVERY 8 HOURS
Status: DISCONTINUED | OUTPATIENT
Start: 2018-04-20 | End: 2018-04-23 | Stop reason: HOSPADM

## 2018-04-20 RX ORDER — FLUTICASONE FUROATE AND VILANTEROL 100; 25 UG/1; UG/1
1 POWDER RESPIRATORY (INHALATION) DAILY
Status: DISCONTINUED | OUTPATIENT
Start: 2018-04-21 | End: 2018-04-21

## 2018-04-20 RX ORDER — HEPARIN SODIUM 5000 [USP'U]/ML
5000 INJECTION, SOLUTION INTRAVENOUS; SUBCUTANEOUS EVERY 8 HOURS
Status: DISCONTINUED | OUTPATIENT
Start: 2018-04-20 | End: 2018-04-23 | Stop reason: HOSPADM

## 2018-04-20 RX ORDER — ALBUTEROL SULFATE 0.83 MG/ML
2.5 SOLUTION RESPIRATORY (INHALATION)
Status: DISCONTINUED | OUTPATIENT
Start: 2018-04-21 | End: 2018-04-21 | Stop reason: ALTCHOICE

## 2018-04-20 RX ORDER — TAMSULOSIN HYDROCHLORIDE 0.4 MG/1
0.4 CAPSULE ORAL EVERY EVENING
Status: ON HOLD | COMMUNITY
End: 2022-04-15 | Stop reason: SDUPTHER

## 2018-04-20 RX ORDER — IPRATROPIUM BROMIDE 21 UG/1
2 SPRAY, METERED NASAL
Status: ON HOLD | COMMUNITY
End: 2020-08-07

## 2018-04-20 RX ORDER — SODIUM CHLORIDE 0.9 % (FLUSH) 0.9 %
5-10 SYRINGE (ML) INJECTION AS NEEDED
Status: DISCONTINUED | OUTPATIENT
Start: 2018-04-20 | End: 2018-04-23 | Stop reason: HOSPADM

## 2018-04-20 RX ORDER — PREDNISONE 10 MG/1
TABLET ORAL
Qty: 75 TAB | Refills: 0 | Status: SHIPPED | OUTPATIENT
Start: 2018-04-20 | End: 2018-04-23

## 2018-04-20 RX ORDER — MAGNESIUM SULFATE 100 %
4 CRYSTALS MISCELLANEOUS AS NEEDED
Status: DISCONTINUED | OUTPATIENT
Start: 2018-04-20 | End: 2018-04-23 | Stop reason: HOSPADM

## 2018-04-20 RX ORDER — TAMSULOSIN HYDROCHLORIDE 0.4 MG/1
0.4 CAPSULE ORAL DAILY
Status: DISCONTINUED | OUTPATIENT
Start: 2018-04-21 | End: 2018-04-23 | Stop reason: HOSPADM

## 2018-04-20 RX ORDER — IPRATROPIUM BROMIDE 0.5 MG/2.5ML
0.5 SOLUTION RESPIRATORY (INHALATION)
Status: DISCONTINUED | OUTPATIENT
Start: 2018-04-21 | End: 2018-04-21 | Stop reason: ALTCHOICE

## 2018-04-20 RX ORDER — INSULIN LISPRO 100 [IU]/ML
INJECTION, SOLUTION INTRAVENOUS; SUBCUTANEOUS
Status: DISCONTINUED | OUTPATIENT
Start: 2018-04-21 | End: 2018-04-23 | Stop reason: HOSPADM

## 2018-04-20 RX ORDER — ALBUTEROL SULFATE 0.83 MG/ML
2.5 SOLUTION RESPIRATORY (INHALATION)
Status: COMPLETED | OUTPATIENT
Start: 2018-04-20 | End: 2018-04-20

## 2018-04-20 RX ORDER — DEXTROSE 50 % IN WATER (D50W) INTRAVENOUS SYRINGE
25-50 AS NEEDED
Status: DISCONTINUED | OUTPATIENT
Start: 2018-04-20 | End: 2018-04-23 | Stop reason: HOSPADM

## 2018-04-20 RX ORDER — GUAIFENESIN 600 MG/1
600 TABLET, EXTENDED RELEASE ORAL 2 TIMES DAILY
Status: DISCONTINUED | OUTPATIENT
Start: 2018-04-20 | End: 2018-04-23 | Stop reason: HOSPADM

## 2018-04-20 RX ADMIN — METHYLPREDNISOLONE SODIUM SUCCINATE 125 MG: 125 INJECTION, POWDER, FOR SOLUTION INTRAMUSCULAR; INTRAVENOUS at 16:59

## 2018-04-20 RX ADMIN — SODIUM CHLORIDE 500 MG: 900 INJECTION, SOLUTION INTRAVENOUS at 21:17

## 2018-04-20 RX ADMIN — IPRATROPIUM BROMIDE AND ALBUTEROL SULFATE 3 ML: .5; 3 SOLUTION RESPIRATORY (INHALATION) at 18:23

## 2018-04-20 RX ADMIN — ALBUTEROL SULFATE 5 MG: 2.5 SOLUTION RESPIRATORY (INHALATION) at 20:07

## 2018-04-20 RX ADMIN — GUAIFENESIN 600 MG: 600 TABLET, EXTENDED RELEASE ORAL at 23:27

## 2018-04-20 RX ADMIN — ALBUTEROL SULFATE 2.5 MG: 2.5 SOLUTION RESPIRATORY (INHALATION) at 18:22

## 2018-04-20 RX ADMIN — WATER 1 G: 1 INJECTION INTRAMUSCULAR; INTRAVENOUS; SUBCUTANEOUS at 21:17

## 2018-04-20 RX ADMIN — IPRATROPIUM BROMIDE AND ALBUTEROL SULFATE 3 ML: .5; 3 SOLUTION RESPIRATORY (INHALATION) at 16:40

## 2018-04-20 RX ADMIN — METHYLPREDNISOLONE SODIUM SUCCINATE 60 MG: 125 INJECTION, POWDER, FOR SOLUTION INTRAMUSCULAR; INTRAVENOUS at 23:25

## 2018-04-20 RX ADMIN — HEPARIN SODIUM 5000 UNITS: 5000 INJECTION, SOLUTION INTRAVENOUS; SUBCUTANEOUS at 23:21

## 2018-04-20 NOTE — ED TRIAGE NOTES
Patient with increasing shortness of breath for several weeks. Patient with history of COPD. Sepsis Screening completed    (  )Patient meets SIRS criteria. ( x )Patient does not meet SIRS criteria.       SIRS Criteria is achieved when two or more of the following are present   Temperature < 96.8°F (36°C) or > 100.9°F (38.3°C)   Heart Rate > 90 beats per minute   Respiratory Rate > 20 breaths per minute   WBC count > 12,000 or <4,000 or > 10% bands

## 2018-04-20 NOTE — Clinical Note
Patient Class[de-identified] Observation [688] Type of Bed: Remote Telemetry [29] Reason for Observation: status asthmaticus Admitting Diagnosis: Status asthmaticus with COPD (chronic obstructive pulmonary disease) ([facility] [[id number]] Admitting Physician: Cortes Arteaga [[id number]] Attending Physician: Cortes Arteaga [[id number]]

## 2018-04-20 NOTE — IP AVS SNAPSHOT
[address] [address] 
[phone] Patient: Yana Franklin MRN: [id number] ONW:[birth year] About your hospitalization You were admitted on:  April 20, 2018 You last received care in the:  [address] [place] [address] You were discharged on:  April 23, 2018 Why you were hospitalized Your primary diagnosis was:  Status Asthmaticus With Copd (Chronic Obstructive Pulmonary Disease) ([facility]) Your diagnoses also included:  Pvc's (Premature Ventricular Contractions), Copd (Chronic Obstructive Pulmonary Disease) With Chronic Bronchitis ([facility]) Follow-up Information Follow up With Details Comments Contact Info Anastsaia Coyle MD On 4/27/2018 Follow up appointment @ 9:20am [address] [facility] [address] [facility] 
[phone] [facility] EMERGENCY DEPT Go to As needed, if symptoms worsen 2 Yoel Shen 
[address] [address] 
[phone] Kellie Baker MD On 5/11/2018 Follow up appointment @ 2:00pm  (Please arrive 15 minutes early to complete paperwork) [facility] [address] [facility] 
[phone] [facility] to [place] program)  Chosen to continue managing your healthcare needs. [phone] Discharge Orders None A check lala indicates which time of day the medication should be taken. My Medications START taking these medications Instructions Each Dose to Equal  
 Morning Noon Evening Bedtime  
 azithromycin 500 mg Tab Commonly known as:  Jonatan Phan Your last dose was: Your next dose is: Take 1 Tab by mouth daily for 3 days. 500 mg CHANGE how you take these medications Instructions Each Dose to Equal  
 Morning Noon Evening Bedtime  
 predniSONE 10 mg tablet Commonly known as:  Chris Butler What changed:   
- medication strength 
- how much to take - how to take this - when to take this 
- additional instructions Your last dose was: Your next dose is:    
   
   
 Days 1 & 2: Take FOUR tabs. Days 3 & 4: Take THREE tabs. Days 5 & 6: Take TWO tabs. Days 7 & 8: Take ONE tab. CONTINUE taking these medications Instructions Each Dose to Equal  
 Morning Noon Evening Bedtime * albuterol 2.5 mg /3 mL (0.083 %) nebulizer solution Commonly known as:  PROVENTIL VENTOLIN Your last dose was: Your next dose is:    
   
   
 3 mL by Nebulization route every four (4) hours as needed for Wheezing. 2.5 mg  
    
   
   
   
  
 * albuterol 90 mcg/actuation inhaler Commonly known as:  PROVENTIL HFA, VENTOLIN HFA, PROAIR HFA Your last dose was: Your next dose is: Take 2 Puffs by inhalation every four (4) hours as needed for Wheezing or Shortness of Breath. 2 Puff  
    
   
   
   
  
 budesonide-formoterol 160-4.5 mcg/actuation Hfaa Commonly known as:  SYMBICORT Your last dose was: Your next dose is: Take 2 Puffs by inhalation two (2) times a day. 2 Puff  
    
   
   
   
  
 chlorthalidone 25 mg tablet Commonly known as:  Earnie Sleet Your last dose was: Your next dose is: Take  by mouth daily. ipratropium 0.03 % nasal spray Commonly known as:  ATROVENT Your last dose was: Your next dose is: 2 Sprays every twelve (12) hours. 2 Spray Nebulizer & Compressor machine Your last dose was: Your next dose is:    
   
   
 Dispense: 1 nebulizer machine w all tubing necessary  
     
   
   
   
  
 tamsulosin 0.4 mg capsule Commonly known as:  FLOMAX Your last dose was: Your next dose is: Take 0.4 mg by mouth daily.   
 0.4 mg  
    
   
   
   
  
 * Notice: This list has 2 medication(s) that are the same as other medications prescribed for you. Read the directions carefully, and ask your doctor or other care provider to review them with you. Where to Get Your Medications These medications were sent to [address], [place] - [address] [place] [address] [place] [address] & [address] [place], [place] [address] [address] Phone:  [phone]  
  albuterol 90 mcg/actuation inhaler  
 azithromycin 500 mg Tab  
 predniSONE 10 mg tablet Discharge Instructions Chronic Obstructive Pulmonary Disease (COPD): Care Instructions Your Care Instructions Chronic obstructive pulmonary disease (COPD) is a general term for a group of lung diseases, including emphysema and chronic bronchitis. People with COPD have decreased airflow in and out of the lungs, which makes it hard to breathe. The airways also can get clogged with thick mucus. Cigarette smoking is a major cause of COPD. Although there is no cure for COPD, you can slow its progress. Following your treatment plan and taking care of yourself can help you feel better and live longer. Follow-up care is a key part of your treatment and safety. Be sure to make and go to all appointments, and call your doctor if you are having problems. It's also a good idea to know your test results and keep a list of the medicines you take. How can you care for yourself at home? ?Staying healthy ? · Do not smoke. This is the most important step you can take to prevent more damage to your lungs. If you need help quitting, talk to your doctor about stop-smoking programs and medicines. These can increase your chances of quitting for good. ? · Avoid colds and flu. Get a pneumococcal vaccine shot. If you have had one before, ask your doctor whether you need a second dose. Get the flu vaccine every fall.  If you must be around people with colds or the flu, wash your hands often. ? · Avoid secondhand smoke, air pollution, and high altitudes. Also avoid cold, dry air and hot, humid air. Stay at home with your windows closed when air pollution is bad. ?Medicines and oxygen therapy ? · Take your medicines exactly as prescribed. Call your doctor if you think you are having a problem with your medicine. ? · You may be taking medicines such as: ¨ Bronchodilators. These help open your airways and make breathing easier. Bronchodilators are either short-acting (work for 6 to 9 hours) or long-acting (work for 24 hours). You inhale most bronchodilators, so they start to act quickly. Always carry your quick-relief inhaler with you in case you need it while you are away from home. ¨ Corticosteroids (prednisone, budesonide). These reduce airway inflammation. They come in pill or inhaled form. You must take these medicines every day for them to work well. ? · A spacer may help you get more inhaled medicine to your lungs. Ask your doctor or pharmacist if a spacer is right for you. If it is, ask how to use it properly. ? · Do not take any vitamins, over-the-counter medicine, or herbal products without talking to your doctor first.  
? · If your doctor prescribed antibiotics, take them as directed. Do not stop taking them just because you feel better. You need to take the full course of antibiotics. ? · Oxygen therapy boosts the amount of oxygen in your blood and helps you breathe easier. Use the flow rate your doctor has recommended, and do not change it without talking to your doctor first.  
Activity ? · Get regular exercise. Walking is an easy way to get exercise. Start out slowly, and walk a little more each day. ? · Pay attention to your breathing. You are exercising too hard if you cannot talk while you are exercising. ? · Take short rest breaks when doing household chores and other activities. ? · Learn breathing methods-such as breathing through pursed lips-to help you become less short of breath. ? · If your doctor has not set you up with a pulmonary rehabilitation program, talk to him or her about whether rehab is right for you. Rehab includes exercise programs, education about your disease and how to manage it, help with diet and other changes, and emotional support. Diet ? · Eat regular, healthy meals. Use bronchodilators about 1 hour before you eat to make it easier to eat. Eat several small meals instead of three large ones. Drink beverages at the end of the meal. Avoid foods that are hard to chew. ? · Eat foods that contain protein so that you do not lose muscle mass. ? · Talk with your doctor if you gain too much weight or if you lose weight without trying. ?Mental health ? · Talk to your family, friends, or a therapist about your feelings. It is normal to feel frightened, angry, hopeless, helpless, and even guilty. Talking openly about bad feelings can help you cope. If these feelings last, talk to your doctor. When should you call for help? Call 911 anytime you think you may need emergency care. For example, call if: 
? · You have severe trouble breathing. ?Call your doctor now or seek immediate medical care if: 
? · You have new or worse trouble breathing. ? · You cough up blood. ? · You have a fever. ? Watch closely for changes in your health, and be sure to contact your doctor if: 
? · You cough more deeply or more often, especially if you notice more mucus or a change in the color of your mucus. ? · You have new or worse swelling in your legs or belly. ? · You are not getting better as expected. Where can you learn more? Go to http://ifeoma-jana.info/. Isak Barahona in the search box to learn more about \"Chronic Obstructive Pulmonary Disease (COPD): Care Instructions. \" Current as of: May 12, 2017 Content Version: 11.4 © 3115-8893 [employer]. Care instructions adapted under license by [employer] (which disclaims liability or warranty for this information). If you have questions about a medical condition or this instruction, always ask your healthcare professional. [address] any warranty or liability for your use of this information. Shortness of Breath: Care Instructions Your Care Instructions Shortness of breath has many causes. Sometimes conditions such as anxiety can lead to shortness of breath. Some people get mild shortness of breath when they exercise. Trouble breathing also can be a symptom of a serious problem, such as asthma, lung disease, emphysema, heart problems, and pneumonia. If your shortness of breath continues, you may need tests and treatment. Watch for any changes in your breathing and other symptoms. Follow-up care is a key part of your treatment and safety. Be sure to make and go to all appointments, and call your doctor if you are having problems. It's also a good idea to know your test results and keep a list of the medicines you take. How can you care for yourself at home? · Do not smoke or allow others to smoke around you. If you need help quitting, talk to your doctor about stop-smoking programs and medicines. These can increase your chances of quitting for good. · Get plenty of rest and sleep. · Take your medicines exactly as prescribed. Call your doctor if you think you are having a problem with your medicine. · Find healthy ways to deal with stress. ¨ Exercise daily. ¨ Get plenty of sleep. ¨ Eat regularly and well. When should you call for help? Call 911 anytime you think you may need emergency care. For example, call if: 
? · You have severe shortness of breath. ? · You have symptoms of a heart attack. These may include: ¨ Chest pain or pressure, or a strange feeling in the chest. 
¨ Sweating. ¨ Shortness of breath. ¨ Nausea or vomiting. ¨ Pain, pressure, or a strange feeling in the back, neck, jaw, or upper belly or in one or both shoulders or arms. ¨ Lightheadedness or sudden weakness. ¨ A fast or irregular heartbeat. After you call 911, the  may tell you to chew 1 adult-strength or 2 to 4 low-dose aspirin. Wait for an ambulance. Do not try to drive yourself. ?Call your doctor now or seek immediate medical care if: 
? · Your shortness of breath gets worse or you start to wheeze. Wheezing is a high-pitched sound when you breathe. ? · You wake up at night out of breath or have to prop your head up on several pillows to breathe. ? · You are short of breath after only light activity or while at rest. ? Watch closely for changes in your health, and be sure to contact your doctor if: 
? · You do not get better over the next 1 to 2 days. Where can you learn more? Go to http://ifeoma-jana.info/. Enter S780 in the search box to learn more about \"Shortness of Breath: Care Instructions. \" Current as of: May 12, 2017 Content Version: 11.4 © 3461-4865 [employer]. Care instructions adapted under license by [employer] (which disclaims liability or warranty for this information). If you have questions about a medical condition or this instruction, always ask your healthcare professional. [address] [address] any warranty or liability for your use of this information. Pros[employer] Announcement We are excited to announce that we are making your provider's discharge notes available to you in Prosodic. You will see these notes when they are completed and signed by the physician that discharged you from your recent hospital stay.   If you have any questions or concerns about any information you see in Prosodic, please call the Health Information Department where you were seen or reach out to your Primary Care Provider for more information about your plan of care. Introducing [facility]! Jamia Farfan introduces [employer] patient portal. Now you can access parts of your medical record, email your doctor's office, and request medication refills online. 1. In your internet browser, go to [email or web address] 2. Click on the First Time User? Click Here link in the Sign In box. You will see the New Member Sign Up page. 3. Enter your S[employer] Access Code exactly as it appears below. You will not need to use this code after youve completed the sign-up process. If you do not sign up before the expiration date, you must request a new code. · [employer] Access Code: [email or web address] Expires: 7/19/2018  4:13 PM 
 
4. Enter the last four digits of your Social Security Number (xxxx) and Date of Birth (mm/dd/yyyy) as indicated and click Submit. You will be taken to the next sign-up page. 5. Create a S[employer]x ID. This will be your S[employer] login ID and cannot be changed, so think of one that is secure and easy to remember. 6. Create a S[employer]x password. You can change your password at any time. 7. Enter your Password Reset Question and Answer. This can be used at a later time if you forget your password. 8. Enter your e-mail address. You will receive e-mail notification when new information is available in [address] 9. Click Sign Up. You can now view and download portions of your medical record. 10. Click the Download Summary menu link to download a portable copy of your medical information. If you have questions, please visit the Frequently Asked Questions section of the [employer] website. Remember, [employer] is NOT to be used for urgent needs. For medical emergencies, dial 911. Now available from your iPhone and Android! Introducing Gary Huffman As a Jamia Farfan patient, I wanted to make you aware of our electronic visit tool called Gary Huffman. [employer] 24/7 allows you to connect within minutes with a medical provider 24 hours a day, seven days a week via a mobile device or tablet or logging into a secure website from your computer. You can access [facility] from anywhere in the [place]. A virtual visit might be right for you when you have a simple condition and feel like you just dont want to get out of bed, or cant get away from work for an appointment, when your regular [employer] provider is not available (evenings, weekends or holidays), or when youre out of town and need minor care. Electronic visits cost only $49 and if the [employer] 24/7 provider determines a prescription is needed to treat your condition, one can be electronically transmitted to a nearby pharmacy*. Please take a moment to enroll today if you have not already done so. The enrollment process is free and takes just a few minutes. To enroll, please download the [employer] 24/7 breanne to your tablet or phone, or visit [email or web address] to enroll on your computer. And, as an [address] [place] [address] patient with a [employer] account, the results of your visits will be scanned into your electronic medical record and your primary care provider will be able to view the scanned results. We urge you to continue to see your regular [employer] provider for your ongoing medical care. And while your primary care provider may not be the one available when you seek a [email or web address] virtual visit, the peace of mind you get from getting a real diagnosis real time can be priceless. For more information on [employer] [facility], view our Frequently Asked Questions (FAQs) at [email or web address]. Sincerely, 
 
Vince Lin MD 
Chief Medical Officer Selwyn Florez *:  certain medications cannot be prescribed via [employer] [facility] Unresulted Labs-Please follow up with your PCP about these lab tests Order Current Status CULTURE, RESPIRATORY/SPUTUM/BRONCH W GRAM STAIN Preliminary result Providers Seen During Your Hospitalization Provider Specialty Primary office phone Jaycee De La Torre MD Emergency Medicine [phone] Zhane Rodriguez MD Emergency Medicine [phone] Ross Veloz MD Internal Medicine [phone] Your Primary Care Physician (PCP) Primary Care Physician Office Phone Office Fax Fran Esposito [phone] You are allergic to the following Allergen Reactions Aspirin Other (comments) \"messes my stomach up\" Recent Documentation Height Weight BMI Smoking Status 1.727 m 95.2 kg 31.91 kg/m2 Current Some Day Smoker Emergency Contacts Name Discharge Info Relation Home Work Mobile [place] DISCHARGE CAREGIVER [3] Spouse [3] [phone] Patient Belongings The following personal items are in your possession at time of discharge: 
  Dental Appliances: None  Visual Aid: Glasses, With patient   Hearing Aids/Status: At home  Home Medications: None   Jewelry: Ring  Clothing: Pants, Footwear, Shirt, Jacket/Coat    Other Valuables: Eyeglasses  Personal Items Sent to Safe: none Please provide this summary of care documentation to your next provider. Signatures-by signing, you are acknowledging that this After Visit Summary has been reviewed with you and you have received a copy. Patient Signature:  ____________________________________________________________ Date:  ____________________________________________________________  
  
Benja Mesa Provider Signature:  ____________________________________________________________ Date:  ____________________________________________________________

## 2018-04-20 NOTE — ED PROVIDER NOTES
[address]  EMERGENCY DEPARTMENT HISTORY AND PHYSICAL EXAM    Date: 4/20/2018  Patient Name: Manfred Ji  YOB: 1945  Medical Record Number: [id number]    History of Presenting Illness     Chief Complaint   Patient presents with    Shortness of Breath       History Provided By: Patient and Patient's Wife    Chief Complaint: SOB  Duration: 4-5 Weeks  Timing:  Progressive  Location: Lungs  Quality: Short of breath  Severity: Moderate  Modifying Factors: No relief with at home inhaler or nebulizer  Associated Symptoms: KUMAR, leg swelling, cough productive of phlegm, wheezing    Additional History (Context):   4:18 PM  Manfred Ji is a 74 y.o. male with PMHX COPD, HTN, prostate CA, & radiation effect, who presents to the emergency department C/O progressive SOB onset 4-5 weeks ago. SOB is worse with exertion. Associated symptoms include KUMAR, leg swelling, cough productive of phlegm, wheezing. Wife reports she had difficulty bringing pt to ED because he did not want to come. Pt has inhaler and nebulizer at home for sxs without relief. Wife states pt has been using them constantly for his sxs. Per wife, pt was previously taking Lasix, but does not take them any longer. Pt currently takes Prednisone every day, which he has been taking for months per wife, but had run out of this medication yesterday. Pt denies CP, hx MI, hx coronary stents, and any other Sx or complaints. Shx: +tobacco use, -EtOH use, -illicit drug use    PCP: Roxana Villa MD       Current Outpatient Prescriptions   Medication Sig Dispense Refill    ipratropium (ATROVENT) 0.03 % nasal spray 2 Sprays every twelve (12) hours.  tamsulosin (FLOMAX) 0.4 mg capsule Take 0.4 mg by mouth daily.  predniSONE (DELTASONE) 10 mg tablet 50mg po daily x 5 days, 40mg po daily x 5 days, 30mg po daily x 5 days, 20mg po daily x 5 days, 10mg po daily x 5 days.   Disp: 75 tabs 75 Tab 0    albuterol (PROVENTIL VENTOLIN) 2.5 mg /3 mL (0.083 %) nebulizer solution 3 mL by Nebulization route every four (4) hours as needed for Wheezing. 24 Each 0    budesonide-formoterol (SYMBICORT) 160-4.5 mcg/actuation HFA inhaler Take 2 Puffs by inhalation two (2) times a day. 1 Inhaler 0    predniSONE (DELTASONE) 50 mg tablet Take 1 Tab by mouth daily (with lunch). 5 Tab 0    chlorthalidone (HYGROTEN) 25 mg tablet Take  by mouth daily.  albuterol (PROVENTIL HFA, VENTOLIN HFA, PROAIR HFA) 90 mcg/actuation inhaler Take 2 Puffs by inhalation every four (4) hours as needed for Wheezing or Shortness of Breath. 1 Inhaler 1    Nebulizer & Compressor machine Dispense: 1 nebulizer machine w all tubing necessary 1 each 0         Past History     Past Medical History:  Past Medical History:   Diagnosis Date    Cancer ([facility] [address]     prostate    [facility]     Hypertension     Pneumonia     Radiation effect        Past Surgical History:  Past Surgical History:   Procedure Laterality Date    HX HERNIA REPAIR         Family History:  Family History   Problem Relation Age of Onset    Heart Disease Mother        Social History:  Social History   Substance Use Topics    Smoking status: Current Some Day Smoker     Types: Cigarettes    Smokeless tobacco: Never Used    Alcohol use No       Allergies: Allergies   Allergen Reactions    Aspirin Other (comments)     \"messes my stomach up\"         Review of Systems     Review of Systems   Constitutional: Positive for unexpected weight change. Negative for chills and fever. Respiratory: Positive for cough, shortness of breath and wheezing. Cardiovascular: Positive for leg swelling. Negative for chest pain. All other systems reviewed and are negative.       Physical Exam     Vitals:    04/20/18 1745 04/20/18 1800 04/20/18 1815 04/20/18 1827   BP: 124/67 134/64 126/65    Pulse: 81 83 83    Resp: 26 25 26    Temp:       SpO2: 96% 96% 94% 96%   Weight: Height:         Physical Exam   Nursing note and vitals reviewed. Constitutional: Alert. Visibly SOB with audible wheezing. Head: Normocephalic, Atraumatic  Eyes: Pupils are equal, round, and reactive to light, EOMI  ENT: Moist mucous membranes, oropharynx clear. Neck: Supple, non-tender  Cardiovascular: Regular rate and rhythm, no murmurs, rubs, or gallops  Chest: Normal work of breathing and chest excursion bilaterally. No reproducible chest tenderness. Lungs: Inspiratory and expiratory wheezing in all lung fields with prolonged expiratory phase. Crackles 1/3 up the posterior lungs fields bilaterally. Abdomen: Soft, non tender, non distended, normoactive bowel sounds  Back: No evidence of trauma or deformity. No CVA Tenderness. Extremities: No evidence of trauma or deformity, 2+ pitting edema to knees bilaterally. Skin: Warm and dry  Neuro: Alert and appropriate, facial movement symmetric, normal speech, strength and sensation full and symmetric bilaterally, normal coordination  Psychiatric: Normal mood and affect       Diagnostic Study Results     Labs -     Recent Results (from the past 12 hour(s))   CBC WITH AUTOMATED DIFF    Collection Time: 04/20/18  4:55 PM   Result Value Ref Range    WBC 9.4 4.6 - 13.2 K/uL    RBC 3.85 (L) 4.70 - 5.50 M/uL    HGB 11.4 (L) 13.0 - 16.0 g/dL    HCT 34.0 (L) 36.0 - 48.0 %    MCV 88.3 74.0 - 97.0 FL    MCH 29.6 24.0 - 34.0 PG    MCHC 33.5 31.0 - 37.0 g/dL    RDW 13.2 11.6 - 14.5 %    PLATELET 734 818 - 975 K/uL    MPV 9.0 (L) 9.2 - 11.8 FL    NEUTROPHILS 72 40 - 73 %    LYMPHOCYTES 10 (L) 21 - 52 %    MONOCYTES 7 3 - 10 %    EOSINOPHILS 10 (H) 0 - 5 %    BASOPHILS 1 0 - 2 %    ABS. NEUTROPHILS 6.9 1.8 - 8.0 K/UL    ABS. LYMPHOCYTES 0.9 0.9 - 3.6 K/UL    ABS. MONOCYTES 0.7 0.05 - 1.2 K/UL    ABS. EOSINOPHILS 0.9 (H) 0.0 - 0.4 K/UL    ABS.  BASOPHILS 0.1 (H) 0.0 - 0.06 K/UL    DF AUTOMATED     MAGNESIUM    Collection Time: 04/20/18  4:55 PM   Result Value Ref Range Magnesium 1.7 1.6 - 2.6 mg/dL   METABOLIC PANEL, COMPREHENSIVE    Collection Time: 04/20/18  4:55 PM   Result Value Ref Range    Sodium 135 (L) 136 - 145 mmol/L    Potassium 3.5 3.5 - 5.5 mmol/L    Chloride 97 (L) 100 - 108 mmol/L    CO2 26 21 - 32 mmol/L    Anion gap 12 3.0 - 18 mmol/L    Glucose 123 (H) 74 - 99 mg/dL    BUN 16 7.0 - 18 MG/DL    Creatinine 1.51 (H) 0.6 - 1.3 MG/DL    BUN/Creatinine ratio 11 (L) 12 - 20      GFR est AA 55 (L) >60 ml/min/1.73m2    GFR est non-AA 45 (L) >60 ml/min/1.73m2    Calcium 9.0 8.5 - 10.1 MG/DL    Bilirubin, total 0.4 0.2 - 1.0 MG/DL    ALT (SGPT) 19 16 - 61 U/L    AST (SGOT) 14 (L) 15 - 37 U/L    Alk. phosphatase 74 45 - 117 U/L    Protein, total 6.6 6.4 - 8.2 g/dL    Albumin 3.0 (L) 3.4 - 5.0 g/dL    Globulin 3.6 2.0 - 4.0 g/dL    A-G Ratio 0.8 0.8 - 1.7     CARDIAC PANEL,(CK, CKMB & TROPONIN)    Collection Time: 04/20/18  4:55 PM   Result Value Ref Range     39 - 308 U/L    CK - MB 4.5 (H) <3.6 ng/ml    CK-MB Index 4.0 0.0 - 4.0 %    Troponin-I, Qt. <0.02 0.00 - 0.06 NG/ML       Radiologic Studies -   CXR Results  (Last 48 hours)               04/20/18 1638  XR CHEST PORT Final result    Impression:  IMPRESSION:       No acute process           Narrative:  EXAM: AP portable upright chest       INDICATION: Shortness of breath       COMPARISON: March 18, 2017       _______________       FINDINGS:       Heart and mediastinal contours are normal. Chronic volume loss seen in the right   hemithorax. Chronic interstitial lung changes are present at the right lung   base.  No focal consolidation or effusion or pneumothorax.       _______________                 Medications Given in the ED:  Medications   albuterol-ipratropium (DUO-NEB) 2.5 MG-0.5 MG/3 ML (3 mL Nebulization Given 4/20/18 1640)   methylPREDNISolone (PF) (SOLU-MEDROL) injection 125 mg (125 mg IntraVENous Given 4/20/18 1659)   albuterol-ipratropium (DUO-NEB) 2.5 MG-0.5 MG/3 ML (3 mL Nebulization Given 4/20/18 1823)   albuterol (PROVENTIL VENTOLIN) nebulizer solution 2.5 mg (2.5 mg Nebulization Given 4/20/18 1822)        Medical Decision Making     I am the first provider for this patient. I reviewed the vital signs, available nursing notes, past medical history, past surgical history, family history and social history. Records Reviewed: Nursing Notes, Old Medical Records and Previous Laboratory Studies    Vital Signs-Reviewed the patient's vital signs. Patient Vitals for the past 12 hrs:   Temp Pulse Resp BP SpO2   04/20/18 1827 - - - - 96 %   04/20/18 1815 - 83 26 126/65 94 %   04/20/18 1800 - 83 25 134/64 96 %   04/20/18 1745 - 81 26 124/67 96 %   04/20/18 1730 - 85 22 121/58 95 %   04/20/18 1715 - 89 18 125/58 97 %   04/20/18 1700 - 88 22 130/71 97 %   04/20/18 1645 - 86 25 122/59 97 %   04/20/18 1640 - - - - 97 %   04/20/18 1630 - 90 22 116/71 97 %   04/20/18 1612 98.3 °F (36.8 °C) 91 20 127/68 97 %       Pulse Oximetry Analysis - Normal 97% on RA, will give neb tx      EKG interpretation: (Preliminary)  19:38  NSR at 90 bpm. Frequent PVC's. No acute changes. EKG read by Fabian Coates MD at 19:40     Procedures:  Procedures     ED Course:   4:18 PM Initial assessment performed. Pt and/or pt's family are aware of the plan of care and are in agreement. 7:10 PM Pt had ambulatory pulse ox of 93% and did not feel particularly SOB. Discussion:  74 y.o. male presents for SOB that is likely from a COPD exacerbation. He never had episodes of hypoxia. His vitals signs have been otherwise stable. Improved after an hour long nebulizer tx and steroids. Labs so far are reassuring for no acute process. EKG is nonischemic with no evidence of arrhythmia. CXR is clear. Awaiting pro-BNP results and ambulatory pulse ox for further disposition planning.    Written by SAMUEL Chamberlain, as dictated by Denise Das MD.      SIGN OUT:  7:16 PM  Patient's presentation, labs/imaging and plan of care was reviewed with Kai Seth MD as part of sign out. They will await BNP and plan for disposition as part of the plan discussed with the patient. Kai Seth MD's assistance in completion of this plan is greatly appreciated but it should be noted that I will be the provider of record for this patient. Nga Avila MD    7:45 PM  Pt was transferred to me from Dr. Anne Pulido. Pt has severe wheezing, tight, with prolonged expiratory phase. 7:52 PM Discussed patient's history, exam, and available diagnostics results with Darya Lagunas MD, internal medicine, who agree with admission. Diagnosis and Disposition     Discussion:  Pt presented with progressive SOB over the last 3 weeks, worse since yesterday as per wife. Pt given serial duo-nebs and steroids in ED with minimal improvement. Pt still has severe wheezing on exam. CXR was unremarkable. ECG and troponin showed no evidence of ACS. BNP was normal, doubt CHF. Case discussed with hospitalist who agreed with admission for status asthmaticus to remote telemetry for continued breathing treatments. Written by Karon Severs, ED Scribe, as dictated by Kai Seth MD    Critical Care Time: None    Core Measures:  For Hospitalized Patients:    1. Hospitalization Decision Time:  The decision to hospitalize the patient was made by Kai Seth MD at 7:55 PM on 4/20/2018    2. Aspirin: Aspirin was not given because the patient did not present with a stroke at the time of their Emergency Department evaluation    7:55 PM  Patient is being admitted to the hospital by Darya Lagunas MD. The results of their tests and reasons for their admission have been discussed with them and/or available family. They convey agreement and understanding for the need to be admitted and for their admission diagnosis. CONDITIONS ON ADMISSION:  Sepsis is not present at the time of admission. Deep Vein Thrombosis is not present at the time of admission.  Thrombosis is not present at the time of admission. Urinary Tract Infection is not present at the time of admission. Pneumonia is not present at the time of admission. MRSA is not present at the time of admission. Wound infection is not present at the time of admission. Pressure Ulcer is not present at the time of admission. Clinical Impression:    1. Acute exacerbation of chronic obstructive pulmonary disease (COPD) ([address]    2. Dyspnea on exertion        PLAN: Admit to telemetry  _______________________________    Attestations: This note is prepared by Lesvia Charles, acting as Scribe for John Meyers MD.    John Meyers MD:  The scribe's documentation has been prepared under my direction and personally reviewed by me in its entirety. I confirm that the note above accurately reflects all work, treatment, procedures, and medical decision making performed by me. This note is prepared by Sai Gutierrez, acting as Scribe for Nani Bailey MD.    Nani Bailey MD: The scribe's documentation has been prepared under my direction and personally reviewed by me in its entirety.  I confirm that the note above accurately reflects all work, treatment, procedures, and medical decision making performed by me.   _______________________________

## 2018-04-20 NOTE — H&P
History & Physical    Patient: Taylor Colunga MRN: [id number]  CSN: [id number]    YOB: 1945  Age: 74 y.o. Sex: male      DOA: 4/20/2018    Chief Complaint:   Chief Complaint   Patient presents with    Shortness of Breath          HPI:     Taylor Colunga is a 74 y.o.  male who has COPD, Asbestosis, Prostate Cancer  Presents with worsening cough and congestion   Dyspnea on exertion progressive over the last 3weeks   Has been on chronic po prednisone and duonebs at home with minimal relief   Cough with yellow green sputum production   In ER given several Duonbeb , Magnesium with minimal improvement   Xray no ilinfilitrate  abg no hypercapnia     Past Medical History:   Diagnosis Date    Cancer ([facility] [address]     prostate    [facility]     Hypertension     Pneumonia     Radiation effect        Past Surgical History:   Procedure Laterality Date    HX HERNIA REPAIR         Family History   Problem Relation Age of Onset    Heart Disease Mother        Social History     Social History    Marital status:      Spouse name: N/A    Number of children: N/A    Years of education: N/A     Social History Main Topics    Smoking status: Current Some Day Smoker     Types: Cigarettes    Smokeless tobacco: Never Used    Alcohol use No    Drug use: None    Sexual activity: Not Asked     Other Topics Concern    None     Social History Narrative       Prior to Admission medications    Medication Sig Start Date End Date Taking? Authorizing Provider   ipratropium (ATROVENT) 0.03 % nasal spray 2 Sprays every twelve (12) hours. Yes Blayne Bran MD   tamsulosin (FLOMAX) 0.4 mg capsule Take 0.4 mg by mouth daily. Yes Blayne Bran MD   predniSONE (DELTASONE) 10 mg tablet 50mg po daily x 5 days, 40mg po daily x 5 days, 30mg po daily x 5 days, 20mg po daily x 5 days, 10mg po daily x 5 days.   Disp: 75 tabs 4/20/18  Yes Kerwin Mohamud MD   albuterol (PROVENTIL VENTOLIN) 2.5 mg /3 mL (0.083 %) nebulizer solution 3 mL by Nebulization route every four (4) hours as needed for Wheezing. 12/23/15  Yes TRINA Cervantes   budesonide-formoterol (SYMBICORT) 160-4.5 mcg/actuation HFA inhaler Take 2 Puffs by inhalation two (2) times a day. 12/23/15  Yes TRINA Cervantes   predniSONE (DELTASONE) 50 mg tablet Take 1 Tab by mouth daily (with lunch). 3/24/17   Roxana Greenfield MD   chlorthalidone (HYGROTEN) 25 mg tablet Take  by mouth daily. Blayne Bran MD   albuterol (PROVENTIL HFA, VENTOLIN HFA, PROAIR HFA) 90 mcg/actuation inhaler Take 2 Puffs by inhalation every four (4) hours as needed for Wheezing or Shortness of Breath. 5/19/15   TRINA Shea   Nebulizer & Compressor machine Dispense: 1 nebulizer machine w all tubing necessary 10/6/14   Reema Mark DO       Allergies   Allergen Reactions    Aspirin Other (comments)     \"messes my stomach up\"         Review of Systems  GENERAL: Patient alert, awake and oriented times 3, able to communicate full sentences and not in distress. HEENT: No change in vision, no earache, tinnitus, sore throat or sinus congestion. NECK: No pain or stiffness. PULMONARY:+shortness of breath, +cough or wheeze. Cardiovascular: no pnd or orthopnea, no CP  GASTROINTESTINAL: No abdominal pain, nausea, vomiting or diarrhea, melena or bright red blood per rectum. GENITOURINARY: No urinary frequency, urgency, hesitancy or dysuria. MUSCULOSKELETAL: No joint or muscle pain, no back pain, no recent trauma. DERMATOLOGIC: No rash, no itching, no lesions. ENDOCRINE: No polyuria, polydipsia, no heat or cold intolerance. No recent change in weight. HEMATOLOGICAL: No anemia or easy bruising or bleeding. NEUROLOGIC: No headache, seizures, numbness, tingling or weakness.        Physical Exam:     Physical Exam:  Visit Vitals    /78    Pulse 92    Temp 98.3 °F (36.8 °C)    Resp 16    Ht 5' 8\" (1.727 m)    Wt 94.3 kg (208 lb)    SpO2 96%    BMI 31.63 kg/m2      O2 Device: Room air    Temp (24hrs), Av.3 °F (36.8 °C), Min:98.3 °F (36.8 °C), Max:98.3 °F (36.8 °C)             General:  Alert, cooperative, no distress, appears stated age. Head: Normocephalic, without obvious abnormality, atraumatic. Eyes:  Conjunctivae/corneas clear. PERRL, EOMs intact. Nose: Nares normal. No drainage or sinus tenderness. Neck: Supple, symmetrical, trachea midline, no adenopathy, thyroid: no enlargement, no carotid bruit and no JVD. Lungs:   Clear to auscultation bilaterally. Heart:  Regular rate and rhythm, S1, S2 normal.     Abdomen: Soft, non-tender. Bowel sounds normal.    Extremities: Extremities normal, atraumatic, no cyanosis or edema. Pulses: 2+ and symmetric all extremities. Skin:  No rashes or lesions   Neurologic: AAOx3, No focal motor or sensory deficit. Labs Reviewed: All lab results for the last 24 hours reviewed. Recent Results (from the past 24 hour(s))   CBC WITH AUTOMATED DIFF    Collection Time: 18  4:55 PM   Result Value Ref Range    WBC 9.4 4.6 - 13.2 K/uL    RBC 3.85 (L) 4.70 - 5.50 M/uL    HGB 11.4 (L) 13.0 - 16.0 g/dL    HCT 34.0 (L) 36.0 - 48.0 %    MCV 88.3 74.0 - 97.0 FL    MCH 29.6 24.0 - 34.0 PG    MCHC 33.5 31.0 - 37.0 g/dL    RDW 13.2 11.6 - 14.5 %    PLATELET 461 246 - 284 K/uL    MPV 9.0 (L) 9.2 - 11.8 FL    NEUTROPHILS 72 40 - 73 %    LYMPHOCYTES 10 (L) 21 - 52 %    MONOCYTES 7 3 - 10 %    EOSINOPHILS 10 (H) 0 - 5 %    BASOPHILS 1 0 - 2 %    ABS. NEUTROPHILS 6.9 1.8 - 8.0 K/UL    ABS. LYMPHOCYTES 0.9 0.9 - 3.6 K/UL    ABS. MONOCYTES 0.7 0.05 - 1.2 K/UL    ABS. EOSINOPHILS 0.9 (H) 0.0 - 0.4 K/UL    ABS.  BASOPHILS 0.1 (H) 0.0 - 0.06 K/UL    DF AUTOMATED     MAGNESIUM    Collection Time: 18  4:55 PM   Result Value Ref Range    Magnesium 1.7 1.6 - 2.6 mg/dL   METABOLIC PANEL, COMPREHENSIVE    Collection Time: 18  4:55 PM   Result Value Ref Range    Sodium 135 (L) 136 - 145 mmol/L    Potassium 3.5 3.5 - 5.5 mmol/L    Chloride 97 (L) 100 - 108 mmol/L    CO2 26 21 - 32 mmol/L    Anion gap 12 3.0 - 18 mmol/L    Glucose 123 (H) 74 - 99 mg/dL    BUN 16 7.0 - 18 MG/DL    Creatinine 1.51 (H) 0.6 - 1.3 MG/DL    BUN/Creatinine ratio 11 (L) 12 - 20      GFR est AA 55 (L) >60 ml/min/1.73m2    GFR est non-AA 45 (L) >60 ml/min/1.73m2    Calcium 9.0 8.5 - 10.1 MG/DL    Bilirubin, total 0.4 0.2 - 1.0 MG/DL    ALT (SGPT) 19 16 - 61 U/L    AST (SGOT) 14 (L) 15 - 37 U/L    Alk. phosphatase 74 45 - 117 U/L    Protein, total 6.6 6.4 - 8.2 g/dL    Albumin 3.0 (L) 3.4 - 5.0 g/dL    Globulin 3.6 2.0 - 4.0 g/dL    A-G Ratio 0.8 0.8 - 1.7     CARDIAC PANEL,(CK, CKMB & TROPONIN)    Collection Time: 04/20/18  4:55 PM   Result Value Ref Range     39 - 308 U/L    CK - MB 4.5 (H) <3.6 ng/ml    CK-MB Index 4.0 0.0 - 4.0 %    Troponin-I, Qt. <0.02 0.00 - 0.06 NG/ML   NT-PRO BNP    Collection Time: 04/20/18  4:55 PM   Result Value Ref Range    NT pro-BNP 80 0 - 900 PG/ML   EKG, 12 LEAD, INITIAL    Collection Time: 04/20/18  7:38 PM   Result Value Ref Range    Ventricular Rate 90 BPM    Atrial Rate 90 BPM    P-R Interval 166 ms    QRS Duration 78 ms    Q-T Interval 376 ms    QTC Calculation (Bezet) 459 ms    Calculated P Axis 53 degrees    Calculated R Axis 36 degrees    Calculated T Axis 58 degrees    Diagnosis       Sinus rhythm with frequent premature ventricular complexes  Low voltage QRS  Borderline ECG  When compared with ECG of 18-MAR-2017 16:28,  Nonspecific T wave abnormality now evident in Anterior leads     POC G3    Collection Time: 04/20/18  8:04 PM   Result Value Ref Range    Device: ROOM AIR      FIO2 (POC) 21 %    pH (POC) 7.406 7.35 - 7.45      pCO2 (POC) 36.4 35.0 - 45.0 MMHG    pO2 (POC) 78 (L) 80 - 100 MMHG    HCO3 (POC) 22.8 22 - 26 MMOL/L    sO2 (POC) 96 92 - 97 %    Base deficit (POC) 2 mmol/L    Allens test (POC) YES      Total resp.  rate 23      Site RIGHT RADIAL      Specimen type (POC) ARTERIAL      Performed by Rebeka Gonzales     and EKG    Procedures/imaging: see electronic medical records for all procedures/Xrays and details which were not copied into this note but were reviewed prior to creation of Plan  Recent Results (from the past 24 hour(s))   CBC WITH AUTOMATED DIFF    Collection Time: 04/20/18  4:55 PM   Result Value Ref Range    WBC 9.4 4.6 - 13.2 K/uL    RBC 3.85 (L) 4.70 - 5.50 M/uL    HGB 11.4 (L) 13.0 - 16.0 g/dL    HCT 34.0 (L) 36.0 - 48.0 %    MCV 88.3 74.0 - 97.0 FL    MCH 29.6 24.0 - 34.0 PG    MCHC 33.5 31.0 - 37.0 g/dL    RDW 13.2 11.6 - 14.5 %    PLATELET 213 287 - 090 K/uL    MPV 9.0 (L) 9.2 - 11.8 FL    NEUTROPHILS 72 40 - 73 %    LYMPHOCYTES 10 (L) 21 - 52 %    MONOCYTES 7 3 - 10 %    EOSINOPHILS 10 (H) 0 - 5 %    BASOPHILS 1 0 - 2 %    ABS. NEUTROPHILS 6.9 1.8 - 8.0 K/UL    ABS. LYMPHOCYTES 0.9 0.9 - 3.6 K/UL    ABS. MONOCYTES 0.7 0.05 - 1.2 K/UL    ABS. EOSINOPHILS 0.9 (H) 0.0 - 0.4 K/UL    ABS. BASOPHILS 0.1 (H) 0.0 - 0.06 K/UL    DF AUTOMATED     MAGNESIUM    Collection Time: 04/20/18  4:55 PM   Result Value Ref Range    Magnesium 1.7 1.6 - 2.6 mg/dL   METABOLIC PANEL, COMPREHENSIVE    Collection Time: 04/20/18  4:55 PM   Result Value Ref Range    Sodium 135 (L) 136 - 145 mmol/L    Potassium 3.5 3.5 - 5.5 mmol/L    Chloride 97 (L) 100 - 108 mmol/L    CO2 26 21 - 32 mmol/L    Anion gap 12 3.0 - 18 mmol/L    Glucose 123 (H) 74 - 99 mg/dL    BUN 16 7.0 - 18 MG/DL    Creatinine 1.51 (H) 0.6 - 1.3 MG/DL    BUN/Creatinine ratio 11 (L) 12 - 20      GFR est AA 55 (L) >60 ml/min/1.73m2    GFR est non-AA 45 (L) >60 ml/min/1.73m2    Calcium 9.0 8.5 - 10.1 MG/DL    Bilirubin, total 0.4 0.2 - 1.0 MG/DL    ALT (SGPT) 19 16 - 61 U/L    AST (SGOT) 14 (L) 15 - 37 U/L    Alk.  phosphatase 74 45 - 117 U/L    Protein, total 6.6 6.4 - 8.2 g/dL    Albumin 3.0 (L) 3.4 - 5.0 g/dL    Globulin 3.6 2.0 - 4.0 g/dL    A-G Ratio 0.8 0.8 - 1.7     CARDIAC PANEL,(CK, CKMB & TROPONIN) Collection Time: 04/20/18  4:55 PM   Result Value Ref Range     39 - 308 U/L    CK - MB 4.5 (H) <3.6 ng/ml    CK-MB Index 4.0 0.0 - 4.0 %    Troponin-I, Qt. <0.02 0.00 - 0.06 NG/ML   NT-PRO BNP    Collection Time: 04/20/18  4:55 PM   Result Value Ref Range    NT pro-BNP 80 0 - 900 PG/ML   EKG, 12 LEAD, INITIAL    Collection Time: 04/20/18  7:38 PM   Result Value Ref Range    Ventricular Rate 90 BPM    Atrial Rate 90 BPM    P-R Interval 166 ms    QRS Duration 78 ms    Q-T Interval 376 ms    QTC Calculation (Bezet) 459 ms    Calculated P Axis 53 degrees    Calculated R Axis 36 degrees    Calculated T Axis 58 degrees    Diagnosis       Sinus rhythm with frequent premature ventricular complexes  Low voltage QRS  Borderline ECG  When compared with ECG of 18-MAR-2017 16:28,  Nonspecific T wave abnormality now evident in Anterior leads         Assessment/Plan     Active Problems:    Status asthmaticus with COPD (chronic obstructive pulmonary disease) ([facility] [address] (4/20/2018)     Will continue Intravenous steroids, Bronchodilators, Empiric antibiotics and Mucolytic's. Sputum cultures and sensitivity. If patient does not respond to the above measures will get ABG and will initiate NPPV. Tobacco Disorder  Encourage to quit  Start Flutter Valve    HTN  Resume Chlothalidone    Prostate cancer   Cont with flomax      DVT/GI Prophylaxis: Hep SQ    Discussed with patient at bedside about hospital admission and my plan care, who understood and agree with my plan care.     Darya Lagunas MD  4/20/2018 7:58 PM

## 2018-04-20 NOTE — IP AVS SNAPSHOT
[address] [place] [id number] 
[phone] Patient: James Laird MRN: [id number] GCB:[birth year] A check lala indicates which time of day the medication should be taken. My Medications START taking these medications Instructions Each Dose to Equal  
 Morning Noon Evening Bedtime  
 azithromycin 500 mg Tab Commonly known as:  Bharti Bae Your last dose was: Your next dose is: Take 1 Tab by mouth daily for 3 days. 500 mg CHANGE how you take these medications Instructions Each Dose to Equal  
 Morning Noon Evening Bedtime  
 predniSONE 10 mg tablet Commonly known as:  Arline Gary What changed:   
- medication strength 
- how much to take 
- how to take this - when to take this 
- additional instructions Your last dose was: Your next dose is:    
   
   
 Days 1 & 2: Take FOUR tabs. Days 3 & 4: Take THREE tabs. Days 5 & 6: Take TWO tabs. Days 7 & 8: Take ONE tab. CONTINUE taking these medications Instructions Each Dose to Equal  
 Morning Noon Evening Bedtime * albuterol 2.5 mg /3 mL (0.083 %) nebulizer solution Commonly known as:  PROVENTIL VENTOLIN Your last dose was: Your next dose is:    
   
   
 3 mL by Nebulization route every four (4) hours as needed for Wheezing. 2.5 mg  
    
   
   
   
  
 * albuterol 90 mcg/actuation inhaler Commonly known as:  PROVENTIL HFA, VENTOLIN HFA, PROAIR HFA Your last dose was: Your next dose is: Take 2 Puffs by inhalation every four (4) hours as needed for Wheezing or Shortness of Breath. 2 Puff  
    
   
   
   
  
 budesonide-formoterol 160-4.5 mcg/actuation Hfaa Commonly known as:  SYMBICORT Your last dose was: Your next dose is: Take 2 Puffs by inhalation two (2) times a day. 2 Puff chlorthalidone 25 mg tablet Commonly known as:  Imtiaz Goins Your last dose was: Your next dose is: Take  by mouth daily. ipratropium 0.03 % nasal spray Commonly known as:  ATROVENT Your last dose was: Your next dose is: 2 Sprays every twelve (12) hours. 2 Spray Nebulizer & Compressor machine Your last dose was: Your next dose is:    
   
   
 Dispense: 1 nebulizer machine w all tubing necessary  
     
   
   
   
  
 tamsulosin 0.4 mg capsule Commonly known as:  FLOMAX Your last dose was: Your next dose is: Take 0.4 mg by mouth daily. 0.4 mg  
    
   
   
   
  
 * Notice: This list has 2 medication(s) that are the same as other medications prescribed for you. Read the directions carefully, and ask your doctor or other care provider to review them with you. Where to Get Your Medications These medications were sent to [address], [place] - [address] [address] [place] [address], [address] [place] [id number] Phone:  [phone]  
  albuterol 90 mcg/actuation inhaler  
 azithromycin 500 mg Tab  
 predniSONE 10 mg tablet

## 2018-04-21 LAB
EST. AVERAGE GLUCOSE BLD GHB EST-MCNC: 123 MG/DL
GLUCOSE BLD STRIP.AUTO-MCNC: 136 MG/DL (ref 70–110)
GLUCOSE BLD STRIP.AUTO-MCNC: 144 MG/DL (ref 70–110)
GLUCOSE BLD STRIP.AUTO-MCNC: 152 MG/DL (ref 70–110)
GLUCOSE BLD STRIP.AUTO-MCNC: 160 MG/DL (ref 70–110)
GLUCOSE BLD STRIP.AUTO-MCNC: 227 MG/DL (ref 70–110)
HBA1C MFR BLD: 5.9 % (ref 4.5–5.6)

## 2018-04-21 PROCEDURE — 74011000250 HC RX REV CODE- 250: Performed by: INTERNAL MEDICINE

## 2018-04-21 PROCEDURE — 74011250636 HC RX REV CODE- 250/636: Performed by: INTERNAL MEDICINE

## 2018-04-21 PROCEDURE — 74011250637 HC RX REV CODE- 250/637: Performed by: INTERNAL MEDICINE

## 2018-04-21 PROCEDURE — 65660000000 HC RM CCU STEPDOWN

## 2018-04-21 PROCEDURE — 94640 AIRWAY INHALATION TREATMENT: CPT

## 2018-04-21 PROCEDURE — L1830 KO IMMOB CANVAS LONG PRE OTS: HCPCS

## 2018-04-21 PROCEDURE — 77010033678 HC OXYGEN DAILY

## 2018-04-21 PROCEDURE — 82962 GLUCOSE BLOOD TEST: CPT

## 2018-04-21 PROCEDURE — 74011636637 HC RX REV CODE- 636/637: Performed by: INTERNAL MEDICINE

## 2018-04-21 PROCEDURE — 97162 PT EVAL MOD COMPLEX 30 MIN: CPT

## 2018-04-21 PROCEDURE — 94760 N-INVAS EAR/PLS OXIMETRY 1: CPT

## 2018-04-21 PROCEDURE — 97116 GAIT TRAINING THERAPY: CPT

## 2018-04-21 RX ORDER — IPRATROPIUM BROMIDE AND ALBUTEROL SULFATE 2.5; .5 MG/3ML; MG/3ML
3 SOLUTION RESPIRATORY (INHALATION)
Status: DISCONTINUED | OUTPATIENT
Start: 2018-04-21 | End: 2018-04-23 | Stop reason: HOSPADM

## 2018-04-21 RX ORDER — ALBUTEROL SULFATE 0.83 MG/ML
2.5 SOLUTION RESPIRATORY (INHALATION)
Status: DISCONTINUED | OUTPATIENT
Start: 2018-04-21 | End: 2018-04-23 | Stop reason: HOSPADM

## 2018-04-21 RX ORDER — ARFORMOTEROL TARTRATE 15 UG/2ML
15 SOLUTION RESPIRATORY (INHALATION)
Status: DISCONTINUED | OUTPATIENT
Start: 2018-04-21 | End: 2018-04-23 | Stop reason: HOSPADM

## 2018-04-21 RX ORDER — BUDESONIDE 0.5 MG/2ML
500 INHALANT ORAL
Status: DISCONTINUED | OUTPATIENT
Start: 2018-04-21 | End: 2018-04-23 | Stop reason: HOSPADM

## 2018-04-21 RX ADMIN — CHLORTHALIDONE 25 MG: 25 TABLET ORAL at 08:24

## 2018-04-21 RX ADMIN — INSULIN LISPRO 2 UNITS: 100 INJECTION, SOLUTION INTRAVENOUS; SUBCUTANEOUS at 21:48

## 2018-04-21 RX ADMIN — Medication 10 ML: at 18:59

## 2018-04-21 RX ADMIN — HEPARIN SODIUM 5000 UNITS: 5000 INJECTION, SOLUTION INTRAVENOUS; SUBCUTANEOUS at 21:48

## 2018-04-21 RX ADMIN — HYDROCODONE POLISTIREX AND CHLORPHENIRAMINE POLISTIREX 5 ML: 10; 8 SUSPENSION, EXTENDED RELEASE ORAL at 19:06

## 2018-04-21 RX ADMIN — INSULIN LISPRO 2 UNITS: 100 INJECTION, SOLUTION INTRAVENOUS; SUBCUTANEOUS at 17:18

## 2018-04-21 RX ADMIN — TAMSULOSIN HYDROCHLORIDE 0.4 MG: 0.4 CAPSULE ORAL at 08:24

## 2018-04-21 RX ADMIN — IPRATROPIUM BROMIDE AND ALBUTEROL SULFATE 3 ML: .5; 3 SOLUTION RESPIRATORY (INHALATION) at 19:16

## 2018-04-21 RX ADMIN — IPRATROPIUM BROMIDE AND ALBUTEROL SULFATE 3 ML: .5; 3 SOLUTION RESPIRATORY (INHALATION) at 07:06

## 2018-04-21 RX ADMIN — Medication 10 ML: at 00:13

## 2018-04-21 RX ADMIN — GUAIFENESIN 600 MG: 600 TABLET, EXTENDED RELEASE ORAL at 08:24

## 2018-04-21 RX ADMIN — METHYLPREDNISOLONE SODIUM SUCCINATE 40 MG: 125 INJECTION, POWDER, FOR SOLUTION INTRAMUSCULAR; INTRAVENOUS at 21:47

## 2018-04-21 RX ADMIN — FLUTICASONE FUROATE AND VILANTEROL TRIFENATATE 1 PUFF: 100; 25 POWDER RESPIRATORY (INHALATION) at 08:24

## 2018-04-21 RX ADMIN — Medication 10 ML: at 21:48

## 2018-04-21 RX ADMIN — IPRATROPIUM BROMIDE AND ALBUTEROL SULFATE 3 ML: .5; 3 SOLUTION RESPIRATORY (INHALATION) at 15:08

## 2018-04-21 RX ADMIN — ARFORMOTEROL TARTRATE 15 MCG: 15 SOLUTION RESPIRATORY (INHALATION) at 19:16

## 2018-04-21 RX ADMIN — WATER 1 G: 1 INJECTION INTRAMUSCULAR; INTRAVENOUS; SUBCUTANEOUS at 21:46

## 2018-04-21 RX ADMIN — Medication 10 ML: at 06:03

## 2018-04-21 RX ADMIN — METHYLPREDNISOLONE SODIUM SUCCINATE 60 MG: 125 INJECTION, POWDER, FOR SOLUTION INTRAMUSCULAR; INTRAVENOUS at 12:16

## 2018-04-21 RX ADMIN — BUDESONIDE 500 MCG: 0.5 INHALANT RESPIRATORY (INHALATION) at 19:16

## 2018-04-21 RX ADMIN — HEPARIN SODIUM 5000 UNITS: 5000 INJECTION, SOLUTION INTRAVENOUS; SUBCUTANEOUS at 06:05

## 2018-04-21 RX ADMIN — METHYLPREDNISOLONE SODIUM SUCCINATE 60 MG: 125 INJECTION, POWDER, FOR SOLUTION INTRAMUSCULAR; INTRAVENOUS at 06:01

## 2018-04-21 RX ADMIN — SODIUM CHLORIDE 500 MG: 900 INJECTION, SOLUTION INTRAVENOUS at 21:46

## 2018-04-21 RX ADMIN — HEPARIN SODIUM 5000 UNITS: 5000 INJECTION, SOLUTION INTRAVENOUS; SUBCUTANEOUS at 17:18

## 2018-04-21 RX ADMIN — IPRATROPIUM BROMIDE AND ALBUTEROL SULFATE 3 ML: .5; 3 SOLUTION RESPIRATORY (INHALATION) at 11:27

## 2018-04-21 RX ADMIN — GUAIFENESIN 600 MG: 600 TABLET, EXTENDED RELEASE ORAL at 21:47

## 2018-04-21 NOTE — ROUTINE PROCESS
Bedside and Verbal shift change report given to Leeanna Horn RN (oncoming nurse) by Colt Delatorre RN (offgoing nurse). Report included the following information SBAR, Kardex, Procedure Summary, Intake/Output, MAR, Accordion, Recent Results and Med Rec Status.

## 2018-04-21 NOTE — CONSULTS
[facility]                  Pulmonary, Critical Care, and Sleep Medicine     Name: Dre Kerns MRN: [id number]   : 1945 Hospital: [facility] [place]    Date: 2018  Room: 304     [facility] Note                                              Consult requesting physician: Dr. Javid Carson  Reason for Consult: AE-COPD    IMPRESSION:   · AE-COPD  · Chronic dystrophic pleural based mass like fluid collection with heterogenous complex internal attenuation right lung, suggesting sequelae of chronic granulomatous process. On CT 17. · CKD  · Asbestos exposure  · Prostate cancer. · Smoker     Echo 3/22/17: LVEF 55-60%, 1 DD. RECOMMENDATIONS:   Dyspnea and wheezing. No leucocytosis, no fever. Sputum cx 18: many GPC in pairs and chairs. Few GPR. ABG ok. Change brovana to pulmicort neb bid and brovana neb bid  C/w duone qid for today. May change to atrovent qid in am.   Start O2 via NC. Follow sputum cx  C/w rocephin and zithromax. C/w steroids, reduce sollumedrol to 40 mg q12. Stop smoking. Will need home o2 eval when ready for discharge. Outpatient PFT    CT findings are concerning for asbestos exposure with its consequences. Has worked in shipyard. Right lung volume loss and loculated right effusion. No symptoms of mesothelioma or active malignancy. Will need outpatient repeat CT.     FiO2 to keep SpO2 >=92%, HOB >=30 degree, aspiration precautions, aggressive pulmonary toileting, incentive spirometry, PT/OT eval and treat, mobilization. GI Prophylaxis: low risk for stress ulcer  DVT Prophylaxis: per primary team heparin  Smoking cessation counseling done by me     Other issues management by primary team and respective consultants. Further recommendations will be based on the patient's response to recommended treatment and results of the investigation ordered.     Quality Care: PPI, DVT prophylaxis, HOB elevated, infection control all reviewed and addressed. Discussed with patient, radiologic work up showed, answered all questions to their satisfaction. Care plan discussed with nursing, Dr. Edmond Escobar. Subjective/History:     Karen Germain is a 74 y.o. male with PMHx significant for asbestos exposure, prostate cancer, HTN, smoker, admitted with AE-COPD. CT showed chronic RUL density suggestive of chronic granulomatous sequale. Chronic wheezing. Worsening dyspnea and cough with yellow expectoration since last 1 week. Feeling better in regards to dyspnea and wheezing since admission. On room air. Cough improved. No hemoptysis. No fever chills cp leg edema pnd. No HA, diplopia, seizure, focal weakness, generalized weakness, gait imbalance, frequent falls, excess urination, bone pain, joint pain/swelling/stiffness, weight loss, appetite loss, night sweats, hemoptysis, dysphonia, dysphagia, lumps/bumps at skin/neck/armpit. Occupations hx: has worked in [address] [place] [address] [address] Hx: active long time smoker. FMHx: no lung cancer. Review of Systems:  A comprehensive review of systems was negative except for that written in the HPI.     Review of Systems:   HEENT: No epistaxis, no nasal drainage, no difficulty in swallowing, no redness in eyes  Respiratory: as above  Cardiovascular: no chest pain, no palpitations, no chronic leg edema, no syncope  Gastrointestinal: no abd pain, no vomiting, no diarrhea, no bleeding symptoms  Genitourinary: No urinary symptoms or hematuria  Integument/breast: No ulcers or rashes  Musculoskeletal:Neg  Neurological: No focal weakness, no seizures, no headaches  Behvioral/Psych: No anxiety, no depression  Constitutional: No fever, no chills, no weight loss, no night sweats       Immunization status:   Immunization History   Administered Date(s) Administered    Influenza Vaccine 01/15/2017    Influenza Vaccine PF 10/06/2014    Pneumococcal Vaccine (Unspecified Type) 10/01/2012, 10/01/2012      Allergies Allergen Reactions    Aspirin Other (comments)     \"messes my stomach up\"        Past Medical History:   Diagnosis Date    Cancer ([facility] [address]     prostate    COPD (chronic obstructive pulmonary disease) ([facility] [address]     Hypertension     Pneumonia     Radiation effect         Past Surgical History:   Procedure Laterality Date    HX HERNIA REPAIR          Family History   Problem Relation Age of Onset    Heart Disease Mother         Social History   Substance Use Topics    Smoking status: Current Some Day Smoker     Types: Cigarettes    Smokeless tobacco: Never Used    Alcohol use No        Prior to Admission medications    Medication Sig Start Date End Date Taking? Authorizing Provider   ipratropium (ATROVENT) 0.03 % nasal spray 2 Sprays every twelve (12) hours. Yes Blayne Bran MD   tamsulosin (FLOMAX) 0.4 mg capsule Take 0.4 mg by mouth daily. Yes Blayne Bran MD   predniSONE (DELTASONE) 10 mg tablet 50mg po daily x 5 days, 40mg po daily x 5 days, 30mg po daily x 5 days, 20mg po daily x 5 days, 10mg po daily x 5 days. Disp: 75 tabs 4/20/18  Yes Mumtaz Rankin MD   albuterol (PROVENTIL VENTOLIN) 2.5 mg /3 mL (0.083 %) nebulizer solution 3 mL by Nebulization route every four (4) hours as needed for Wheezing. 12/23/15  Yes TRINA Youssef   budesonide-formoterol (SYMBICORT) 160-4.5 mcg/actuation HFA inhaler Take 2 Puffs by inhalation two (2) times a day. 12/23/15  Yes TRINA Youssef   predniSONE (DELTASONE) 50 mg tablet Take 1 Tab by mouth daily (with lunch). 3/24/17   Anjali Lepe MD   chlorthalidone (HYGROTEN) 25 mg tablet Take  by mouth daily. Blayne Bran MD   albuterol (PROVENTIL HFA, VENTOLIN HFA, PROAIR HFA) 90 mcg/actuation inhaler Take 2 Puffs by inhalation every four (4) hours as needed for Wheezing or Shortness of Breath.  5/19/15   TRINA Gutierrez   Nebulizer & Compressor machine Dispense: 1 nebulizer machine w all tubing necessary 10/6/14   Valencia Bright DO       Current Facility-Administered Medications   Medication Dose Route Frequency    albuterol (PROVENTIL VENTOLIN) nebulizer solution 2.5 mg  2.5 mg Nebulization Q4H PRN    albuterol-ipratropium (DUO-NEB) 2.5 MG-0.5 MG/3 ML  3 mL Nebulization QID RT    sodium chloride (NS) flush 5-10 mL  5-10 mL IntraVENous Q8H    sodium chloride (NS) flush 5-10 mL  5-10 mL IntraVENous PRN    methylPREDNISolone (PF) (SOLU-MEDROL) injection 60 mg  60 mg IntraVENous Q6H    cefTRIAXone (ROCEPHIN) 1 g in sterile water (preservative free) 10 mL IV syringe  1 g IntraVENous Q24H    azithromycin (ZITHROMAX) 500 mg in 0.9% sodium chloride 250 mL IVPB  500 mg IntraVENous Q24H    heparin (porcine) injection 5,000 Units  5,000 Units SubCUTAneous Q8H    guaiFENesin ER (MUCINEX) tablet 600 mg  600 mg Oral BID    chlorpheniramine-HYDROcodone (TUSSIONEX) oral suspension 5 mL  5 mL Oral Q12H PRN    fluticasone-vilanterol (BREO ELLIPTA) 100mcg-25mcg/puff  1 Puff Inhalation DAILY    chlorthalidone (HYGROTEN) tablet 25 mg  25 mg Oral DAILY    tamsulosin (FLOMAX) capsule 0.4 mg  0.4 mg Oral DAILY    insulin lispro (HUMALOG) injection   SubCUTAneous AC&HS    glucose chewable tablet 16 g  4 Tab Oral PRN    glucagon (GLUCAGEN) injection 1 mg  1 mg IntraMUSCular PRN    dextrose (D50W) injection syrg 12.5-25 g  25-50 mL IntraVENous PRN         Objective:   Vital Signs:    Visit Vitals    /81 (BP 1 Location: Left arm, BP Patient Position: At rest)    Pulse 88    Temp 98 °F (36.7 °C)    Resp 24    Ht 5' 8\" (1.727 m)    Wt 95 kg (209 lb 8 oz)    SpO2 95%    BMI 31.85 kg/m2       O2 Device: Room air       Temp (24hrs), Av.1 °F (36.7 °C), Min:97.6 °F (36.4 °C), Max:98.3 °F (36.8 °C)       Intake/Output:   Last shift:       07 - 04/21 1900  In: 240 [P.O.:240]  Out: 300 [Urine:300]  Last 3 shifts: 1901 -  0700  In: 260 [I.V.:260]  Out: 550 [Urine:550]    Intake/Output Summary (Last 24 hours) at 18 1250  Last data filed at 04/21/18 0958   Gross per 24 hour   Intake              500 ml   Output              850 ml   Net             -350 ml       Physical Exam:     General/Neurology: Alert, Awake, NAD  Head:   Normocephalic, without obvious abnormality, atraumatic. Eye:   EOM intact, no scleral icterus, no pallor, no cyanosis. Nose:   No sinus tenderness, no erythema, no induration, no discharge  Throat:  Lips, mucosa, and tongue normal. No oral thrush. Neck:   Supple, symmetric. No carotid bruit. No lymphadenopathy. Trachea midline  Chest wall: No deformity. No rash. Lung: Moderate air entry bilateral equal. No rales. No rhonchi.expiratory wheezing b/l +. No stridors. No prolongded expiration. No accessory muscle use. Heart:   Regular rate & rhythm. S1 S2 present. No murmur. No JVD. Abdomen:  Soft. NT. ND. +BS. Obese. Extremities:  Trace b/l pedal edema. No cyanosis. No clubbing. Pulses: 2+ and symmetric in DP. Capillary refill: normal  Lymphatic:  No cervical or supraclavicular palpable lymphadenopathy. Musculoskeletal: No joint swelling. No tenderness. Skin:   Color, texture, turgor normal. No rashes or lesions. Data:       Recent Results (from the past 24 hour(s))   CBC WITH AUTOMATED DIFF    Collection Time: 04/20/18  4:55 PM   Result Value Ref Range    WBC 9.4 4.6 - 13.2 K/uL    RBC 3.85 (L) 4.70 - 5.50 M/uL    HGB 11.4 (L) 13.0 - 16.0 g/dL    HCT 34.0 (L) 36.0 - 48.0 %    MCV 88.3 74.0 - 97.0 FL    MCH 29.6 24.0 - 34.0 PG    MCHC 33.5 31.0 - 37.0 g/dL    RDW 13.2 11.6 - 14.5 %    PLATELET 869 932 - 309 K/uL    MPV 9.0 (L) 9.2 - 11.8 FL    NEUTROPHILS 72 40 - 73 %    LYMPHOCYTES 10 (L) 21 - 52 %    MONOCYTES 7 3 - 10 %    EOSINOPHILS 10 (H) 0 - 5 %    BASOPHILS 1 0 - 2 %    ABS. NEUTROPHILS 6.9 1.8 - 8.0 K/UL    ABS. LYMPHOCYTES 0.9 0.9 - 3.6 K/UL    ABS. MONOCYTES 0.7 0.05 - 1.2 K/UL    ABS. EOSINOPHILS 0.9 (H) 0.0 - 0.4 K/UL    ABS.  BASOPHILS 0.1 (H) 0.0 - 0.06 K/UL    DF AUTOMATED     MAGNESIUM Collection Time: 04/20/18  4:55 PM   Result Value Ref Range    Magnesium 1.7 1.6 - 2.6 mg/dL   METABOLIC PANEL, COMPREHENSIVE    Collection Time: 04/20/18  4:55 PM   Result Value Ref Range    Sodium 135 (L) 136 - 145 mmol/L    Potassium 3.5 3.5 - 5.5 mmol/L    Chloride 97 (L) 100 - 108 mmol/L    CO2 26 21 - 32 mmol/L    Anion gap 12 3.0 - 18 mmol/L    Glucose 123 (H) 74 - 99 mg/dL    BUN 16 7.0 - 18 MG/DL    Creatinine 1.51 (H) 0.6 - 1.3 MG/DL    BUN/Creatinine ratio 11 (L) 12 - 20      GFR est AA 55 (L) >60 ml/min/1.73m2    GFR est non-AA 45 (L) >60 ml/min/1.73m2    Calcium 9.0 8.5 - 10.1 MG/DL    Bilirubin, total 0.4 0.2 - 1.0 MG/DL    ALT (SGPT) 19 16 - 61 U/L    AST (SGOT) 14 (L) 15 - 37 U/L    Alk.  phosphatase 74 45 - 117 U/L    Protein, total 6.6 6.4 - 8.2 g/dL    Albumin 3.0 (L) 3.4 - 5.0 g/dL    Globulin 3.6 2.0 - 4.0 g/dL    A-G Ratio 0.8 0.8 - 1.7     CARDIAC PANEL,(CK, CKMB & TROPONIN)    Collection Time: 04/20/18  4:55 PM   Result Value Ref Range     39 - 308 U/L    CK - MB 4.5 (H) <3.6 ng/ml    CK-MB Index 4.0 0.0 - 4.0 %    Troponin-I, Qt. <0.02 0.00 - 0.06 NG/ML   NT-PRO BNP    Collection Time: 04/20/18  4:55 PM   Result Value Ref Range    NT pro-BNP 80 0 - 900 PG/ML   HEMOGLOBIN A1C WITH EAG    Collection Time: 04/20/18  4:55 PM   Result Value Ref Range    Hemoglobin A1c 5.9 (H) 4.5 - 5.6 %    Est. average glucose 123 mg/dL   EKG, 12 LEAD, INITIAL    Collection Time: 04/20/18  7:38 PM   Result Value Ref Range    Ventricular Rate 90 BPM    Atrial Rate 90 BPM    P-R Interval 166 ms    QRS Duration 78 ms    Q-T Interval 376 ms    QTC Calculation (Bezet) 459 ms    Calculated P Axis 53 degrees    Calculated R Axis 36 degrees    Calculated T Axis 58 degrees    Diagnosis       Sinus rhythm with frequent premature ventricular complexes  Low voltage QRS  Borderline ECG  When compared with ECG of 18-MAR-2017 16:28,  Nonspecific T wave abnormality now evident in Anterior leads     POC G3    Collection Time: 04/20/18  8:04 PM   Result Value Ref Range    Device: ROOM AIR      FIO2 (POC) 21 %    pH (POC) 7.406 7.35 - 7.45      pCO2 (POC) 36.4 35.0 - 45.0 MMHG    pO2 (POC) 78 (L) 80 - 100 MMHG    HCO3 (POC) 22.8 22 - 26 MMOL/L    sO2 (POC) 96 92 - 97 %    Base deficit (POC) 2 mmol/L    Allens test (POC) YES      Total resp. rate 23      Site RIGHT RADIAL      Specimen type (POC) ARTERIAL      Performed by Eze Garcia    CULTURE, RESPIRATORY/SPUTUM/BRONCH Sita Beech STAIN    Collection Time: 04/20/18 11:30 PM   Result Value Ref Range    Special Requests: NO SPECIAL REQUESTS      GRAM STAIN 10-25 WBC/lpf      GRAM STAIN <10 EPI/lpf      GRAM STAIN MUCUS PRESENT      GRAM STAIN        MANY GRAM POSITIVE COCCI IN PAIRS IN CHAINS IN GROUPS    GRAM STAIN FEW GRAM POSITIVE RODS      Culture result: PENDING    GLUCOSE, POC    Collection Time: 04/21/18 12:11 AM   Result Value Ref Range    Glucose (POC) 227 (H) 70 - 110 mg/dL   GLUCOSE, POC    Collection Time: 04/21/18  7:03 AM   Result Value Ref Range    Glucose (POC) 136 (H) 70 - 110 mg/dL   GLUCOSE, POC    Collection Time: 04/21/18 11:36 AM   Result Value Ref Range    Glucose (POC) 144 (H) 70 - 110 mg/dL         Chemistry Recent Labs      04/20/18   1655   GLU  123*   NA  135*   K  3.5   CL  97*   CO2  26   BUN  16   CREA  1.51*   CA  9.0   MG  1.7   AGAP  12   BUCR  11*   AP  74   TP  6.6   ALB  3.0*   GLOB  3.6   AGRAT  0.8        Lactic Acid Lactic acid   Date Value Ref Range Status   10/02/2014 0.8 0.4 - 2.0 MMOL/L Final     No results for input(s): LAC in the last 72 hours.      Liver Enzymes Protein, total   Date Value Ref Range Status   04/20/2018 6.6 6.4 - 8.2 g/dL Final     Albumin   Date Value Ref Range Status   04/20/2018 3.0 (L) 3.4 - 5.0 g/dL Final     Globulin   Date Value Ref Range Status   04/20/2018 3.6 2.0 - 4.0 g/dL Final     A-G Ratio   Date Value Ref Range Status   04/20/2018 0.8 0.8 - 1.7   Final     AST (SGOT)   Date Value Ref Range Status 04/20/2018 14 (L) 15 - 37 U/L Final     Alk. phosphatase   Date Value Ref Range Status   04/20/2018 74 45 - 117 U/L Final     Recent Labs      04/20/18   1655   TP  6.6   ALB  3.0*   GLOB  3.6   AGRAT  0.8   SGOT  14*   AP  74        CBC w/Diff Recent Labs      04/20/18   1655   WBC  9.4   RBC  3.85*   HGB  11.4*   HCT  34.0*   PLT  285   GRANS  72   LYMPH  10*   EOS  10*        Cardiac Enzymes Lab Results   Component Value Date/Time     04/20/2018 04:55 PM    CKMB 4.5 (H) 04/20/2018 04:55 PM    CKND1 4.0 04/20/2018 04:55 PM    TROIQ <0.02 04/20/2018 04:55 PM        BNP No results found for: BNP, BNPP, XBNPT     Coagulation No results for input(s): PTP, INR, APTT in the last 72 hours. No lab exists for component: INREXT      Thyroid  No results found for: T4, T3U, TSH, TSHEXT    No results found for: T4     Urinalysis No results found for: COLOR, APPRN, SPGRU, REFSG, DEJA, PROTU, GLUCU, KETU, BILU, UROU, KALYN, LEUKU, GLUKE, EPSU, BACTU, WBCU, RBCU, CASTS, UCRY     Micro  Recent Labs      04/20/18   2330   CULT  PENDING     Recent Labs      04/20/18   2330   CULT  PENDING        ABG Recent Labs      04/20/18   2004   PHI  7.406   PCO2I  36.4   PO2I  78*   HCO3I  22.8   FIO2I  21        PFT       Ultrasound       LE Doppler       ECHO 3/22/17 Left ventricle: Systolic function was normal by visual assessment. Ejection fraction was estimated in the range of 55 % to 60 %. Poor  resolution of endocardial border. Doppler parameters were consistent with  abnormal left ventricular relaxation (grade 1 diastolic dysfunction). CT (Most Recent) (CT chest reviewed by me)   Results from Hospital Encounter encounter on 03/18/17   CT CHEST WO CONT   Narrative COMPARISON: Chest x-ray dated March 18, 2017    INDICATION: Blunt trauma. Right lower lobe mass. TECHNIQUE: Axial was performed through the chest without contrast.  Coronal and  sagittal reformations were generated.  Dose reduction techniques used: Automated  exposure control, adjustment of the mAs and/or kVp according to patient's size,  and iterative reconstruction techniques.    ============    LUNGS and pleura: There is an ovoid peripherally calcified heterogeneous but  predominately hyperattenuating pleural-based mass along the anterior right upper  lobe which measures 2.6 x 1.8 cm transaxial by up to 4.9 cm craniocaudal. This  likely corresponds to the ovoid opacity described on the prior radiographs and  was present dating back to the earliest prior exam from 2008 although there are  progressive dystrophic calcifications. Additionally, there is a complex loculated pleural fluid collection with  dystrophic calcifications along the periphery as well as internal heterogeneous  high attenuation and scattered calcific debris. The collection measures  approximately 9.1 x 3.7 cm transaxial by approximately 12.1 cm craniocaudal.  Additional scattered calcified pleural plaque is seen extending along the  posterior right upper lobe. Associated asymmetric volume loss in the right lung. There is mild dependent  pleural thickening inferiorly on the left. There are a few streaky bands of  scarring and atelectasis throughout the right lung. AIRWAY: Unremarkable. MEDIASTINUM: Normal heart size. No pericardial effusion. Great vessels are  unremarkable. No thoracic adenopathy. UPPER ABDOMEN: Scattered calcified granulomas throughout the liver. OTHER: No aggressive osseous lesions. Exaggerated thoracic kyphosis. Multilevel  spondylosis. The bones are osteopenic.    ============         Impression IMPRESSION:      1. Chronic dystrophic pleural-based masslike fluid collections with  heterogeneous complex internal attenuation suggesting sequela of a chronic  granulomatous process. The appearance is atypical for calcified pleural plaques  in the setting of asbestos-related pleural disease.  Findings may represent a  form of a chronic empyema and correspond to the radiographic findings dating  back to at least 2008. XR (Most Recent). CXR  reviewed by me and compared with previous CXR   Results from Hospital Encounter encounter on 04/20/18   XR CHEST PORT   Narrative EXAM: AP portable upright chest    INDICATION: Shortness of breath    COMPARISON: March 18, 2017    _______________    FINDINGS:    Heart and mediastinal contours are normal. Chronic volume loss seen in the right  hemithorax. Chronic interstitial lung changes are present at the right lung  base.  No focal consolidation or effusion or pneumothorax.    _______________         Impression IMPRESSION:    No acute process             Loc Rodriguez MD  4/21/2018

## 2018-04-21 NOTE — PROGRESS NOTES
Progress Note    Patient: Velma Craig MRN: [id number]  CSN: [id number]    YOB: 1945  Age: 74 y.o. Sex: male    DOA: 4/20/2018 LOS:  LOS: 1 day                    Subjective:     Chief Complaint:   Chief Complaint   Patient presents with    Shortness of Breath   admittted with copd excerbation still  With wheezing and cough   minimial sputum production  Tolerating diet ok [place]     Review of systems  General: No fevers or chills. Cardiovascular: No chest pain or pressure. No palpitations. Pulmonary: + shortness of breath, cough or wheeze. Gastrointestinal: No abdominal pain, nausea, vomiting or diarrhea. Genitourinary: No urinary frequency, urgency, hesitancy or dysuria. Musculoskeletal: No joint or muscle pain, no back pain, no recent trauma. Neurologic: No headache, numbness, tingling or weakness. Objective:     Physical Exam:  Visit Vitals    /79 (BP 1 Location: Left arm, BP Patient Position: At rest)    Pulse (!) 102    Temp 98.2 °F (36.8 °C)    Resp 24    Ht 5' 8\" (1.727 m)    Wt 95 kg (209 lb 8 oz)    SpO2 94%    BMI 31.85 kg/m2        General:         Alert, cooperative, no acute distress    HEENT: NC, Atraumatic. PERRLA, anicteric sclerae. Lungs: CTA Bilaterally. + Wheezing/Rhonchi/Rales. Loud gruntin   Heart:  Regular  rhythm,  No murmur, No Rubs, No Gallops  Abdomen: Soft, Non distended, Non tender.  +Bowel sounds, no HSM  Extremities: No c/c/e  Psych:   Good insight. Not anxious or agitated. Neurologic:  CN 2-12 grossly intact, Alert and oriented X 3.   No acute neurological                                 Deficits,     Intake and Output:  Current Shift:     Last three shifts:  04/19 1901 - 04/21 0700  In: 260 [I.V.:260]  Out: 550 [Urine:550]    Labs: Results:       Chemistry Recent Labs      04/20/18   1655   GLU  123*   NA  135*   K  3.5   CL  97*   CO2  26   BUN  16   CREA  1.51*   CA  9.0   AGAP  12   BUCR  11*   AP  74   TP  6.6   ALB  3.0*   GLOB 3. 6   AGRAT  0.8      CBC w/Diff Recent Labs      04/20/18   1655   WBC  9.4   RBC  3.85*   HGB  11.4*   HCT  34.0*   PLT  285   GRANS  72   LYMPH  10*   EOS  10*      Cardiac Enzymes Recent Labs      04/20/18   1655   CPK  112   CKND1  4.0      Coagulation No results for input(s): PTP, INR, APTT in the last 72 hours. No lab exists for component: INREXT    Lipid Panel No results found for: CHOL, CHOLPOCT, CHOLX, CHLST, CHOLV, 218836, HDL, LDL, LDLC, DLDLP, 797382, VLDLC, VLDL, TGLX, TRIGL, TRIGP, TGLPOCT, CHHD, CHHDX   BNP No results for input(s): BNPP in the last 72 hours.    Liver Enzymes Recent Labs      04/20/18   1655   TP  6.6   ALB  3.0*   AP  74   SGOT  14*      Thyroid Studies No results found for: T4, T3U, TSH, TSHEXT       Procedures/imaging: see electronic medical records for all procedures/Xrays and details which were not copied into this note but were reviewed prior to creation of Plan    Medications:   Current Facility-Administered Medications   Medication Dose Route Frequency    albuterol (PROVENTIL VENTOLIN) nebulizer solution 2.5 mg  2.5 mg Nebulization Q4H PRN    albuterol-ipratropium (DUO-NEB) 2.5 MG-0.5 MG/3 ML  3 mL Nebulization QID RT    sodium chloride (NS) flush 5-10 mL  5-10 mL IntraVENous Q8H    sodium chloride (NS) flush 5-10 mL  5-10 mL IntraVENous PRN    methylPREDNISolone (PF) (SOLU-MEDROL) injection 60 mg  60 mg IntraVENous Q6H    cefTRIAXone (ROCEPHIN) 1 g in sterile water (preservative free) 10 mL IV syringe  1 g IntraVENous Q24H    azithromycin (ZITHROMAX) 500 mg in 0.9% sodium chloride 250 mL IVPB  500 mg IntraVENous Q24H    heparin (porcine) injection 5,000 Units  5,000 Units SubCUTAneous Q8H    guaiFENesin ER (MUCINEX) tablet 600 mg  600 mg Oral BID    chlorpheniramine-HYDROcodone (TUSSIONEX) oral suspension 5 mL  5 mL Oral Q12H PRN    fluticasone-vilanterol (BREO ELLIPTA) 100mcg-25mcg/puff  1 Puff Inhalation DAILY    chlorthalidone (HYGROTEN) tablet 25 mg  25 mg Oral DAILY    tamsulosin (FLOMAX) capsule 0.4 mg  0.4 mg Oral DAILY    insulin lispro (HUMALOG) injection   SubCUTAneous AC&HS    glucose chewable tablet 16 g  4 Tab Oral PRN    glucagon (GLUCAGEN) injection 1 mg  1 mg IntraMUSCular PRN    dextrose (D50W) injection syrg 12.5-25 g  25-50 mL IntraVENous PRN       Assessment/Plan     Principal Problem:    Status asthmaticus with COPD (chronic obstructive pulmonary disease) ([facility] [address] (4/20/2018)    Active Problems:    PVC's (premature ventricular contractions) (4/20/2018)      COPD (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 ([facility] [address] (4/20/2018)          Case discussed with:  [x]Patient  []Family  [x]Nursing  []Case Management  DVT Prophylaxis:  []Lovenox  [x]Hep SQ  []SCDs  []Coumadin   []On Heparin gtt    Adrienne Zamarripa MD  4/21/2018 7:34 AM

## 2018-04-21 NOTE — PROGRESS NOTES
Problem: Mobility Impaired (Adult and Pediatric)  Goal: *Acute Goals and Plan of Care (Insert Text)  Physical Therapy Goals  Initiated 4/21/18 to be met in 5-7 days  STG/LTG's:  1. Sit to stand and stand to sit w/ UE use S to improve functional I.  2. Standing balance good with UE support to improve balance for ADLs/ambulation. 3. Activity tolerance: Tolerate 10 minutes of activity with SpO2 >/=93% for improved efficiency with motor tasks. 4. Gait: Ambulate >/=100ft S/LRAD, O2 saturation > 90% on RA, for improved mobility in environment. 5. Patient Education: Independent with HEP and energy conservation techniques for improved endurance for home/facility discharge. Outcome: Progressing Towards Goal  physical Therapy EVALUATION    Patient: Yonas Crowell (88 y.o. male)  Date: 4/21/2018  Primary Diagnosis: Status asthmaticus with COPD (chronic obstructive pulmonary disease) (HCC)  Status asthmaticus with COPD (chronic obstructive pulmonary disease) (HCC)  COPD (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 ([facility])  PVC's (premature ventricular contractions)  Precautions:Fall    ASSESSMENT :  Based on the objective data described below, the patient is a 75 yo M, pleasant and cooperative, [place] (hearing aides at home), who presents with decreased independence in functional mobility, overall motor performance and decreased activity tolerance. Pt O2 sat on RA 93% pre activity; 94% post ambulation (once coughing subsided). Pt able to transition to sit EOB with supervision. Noted  episodes of coughing lasting ~1-2 min after changes in position and after ambulation, with pt requiring ~5 min for recovery thereafter. Able to stand with min HHA and participate in GT/min HHA ~30ft using wide ALEXIA with decreased josé, step length and foot clearance. Patient reports no pain throughout session. Pt left supine in bed with all needs in reach and nurse Altagracia Marquez notified.    Recommend HHPT vs SNF for follow up physical therapy upon discharge and use of RW during ambulation at this time. Pt Education: pt educated in safety/technique during functional mobility tasks, activity pacing     Patient will benefit from skilled intervention to address the above impairments. Patients rehabilitation potential is considered to be Fair  Factors which may influence rehabilitation potential include:   []         None noted  []         Mental ability/status  [x]         Medical condition  []         Home/family situation and support systems  []         Safety awareness  []         Pain tolerance/management  []         Other:      PLAN :  Recommendations and Planned Interventions:  [x]           Bed Mobility Training             []    Neuromuscular Re-Education  [x]           Transfer Training                   []    Orthotic/Prosthetic Training  [x]           Gait Training                          []    Modalities  [x]           Therapeutic Exercises          []    Edema Management/Control  [x]           Therapeutic Activities            [x]    Patient and Family Training/Education  []           Other (comment):    Frequency/Duration: Patient will be followed by physical therapy 1-2 times per day to address goals. Discharge Recommendations: Home Health vs [facility]  Further Equipment Recommendations for Discharge: rolling walker     SUBJECTIVE:   Patient stated I wheeze twenty four seven.     OBJECTIVE DATA SUMMARY:     Past Medical History:   Diagnosis Date    [facility]     prostate    [facility]ch[facility] ([facility]     Hypertension     Pneumonia     Radiation effect      Past Surgical History:   Procedure Laterality Date    HX HERNIA REPAIR       Barriers to Learning/Limitations: yes;  physical  Compensate with: Verbal Cues  Prior Level of Function/Home Situation: report being furniture ambulator at home. No O2 used.   Home Situation  Home Environment: Trailer/mobile home  # Steps to Enter: 5  Rails to Enter: Yes  Hand Rails : Bilateral  One/Two Story Residence: One story  Living Alone: No  Support Systems: Spouse/Significant Other/Partner  Patient Expects to be Discharged to[de-identified] Trailer/mobile home  Current DME Used/Available at Home: None  Tub or Shower Type: Shower  Critical Behavior:  Neurologic State: Alert; Appropriate for age  Orientation Level: Oriented X4  Cognition: Follows commands; Appropriate safety awareness; Appropriate for age attention/concentration; Appropriate decision making  Psychosocial  Purposeful Interaction: Yes  Pt Identified Daily Priority: Clinical issues (comment)  Caritas Process: Nurture loving kindness;Enable irais/hope;Establish trust;Teaching/learning  Caring Interventions: Reassure  Reassure: Informing;Caring rounds  Skin Condition/Temp: Warm;Dry  Skin Integrity: Intact  Skin Integumentary  Skin Color: Appropriate for ethnicity  Skin Condition/Temp: Warm;Dry  Skin Integrity: Intact  Turgor: Non-tenting  Strength:    Strength: Generally decreased, functional  Tone & Sensation:   Sensation: Intact  Range Of Motion:  AROM: Within functional limits  Functional Mobility:  Bed Mobility:  Supine to Sit: Supervision; Additional time (couging after changes in position, wheezing throughout)  Sit to Supine: Supervision  Transfers:  Sit to Stand: Minimum assistance  Stand to Sit: Contact guard assistance  Balance:   Sitting: Intact  Standing: Intact; With support  Ambulation/Gait Training:  Distance (ft): 30 Feet (ft)  Assistive Device: Gait belt  Ambulation - Level of Assistance: Minimal assistance (HHA)  Gait Abnormalities: Decreased step clearance (lateral trunk sway during swing)  Base of Support: Widened  Speed/Lisbet: Pace decreased (<100 feet/min)  Step Length: Left shortened;Right shortened  Pain:  Pain Scale 1: Numeric (0 - 10)  Pain Intensity 1: 0  Activity Tolerance:   Fair-  Please refer to the flowsheet for vital signs taken during this treatment.   After treatment:   []         Patient left in no apparent distress sitting up in chair  [x]         Patient left in no apparent distress in bed  [x]         Call bell left within reach  []         Nursing notified  []         Caregiver present  []         Bed alarm activated    COMMUNICATION/EDUCATION:   [x]         Fall prevention education was provided and the patient/caregiver indicated understanding. [x]         Patient/family have participated as able in goal setting and plan of care. [x]         Patient/family agree to work toward stated goals and plan of care. []         Patient understands intent and goals of therapy, but is neutral about his/her participation. []         Patient is unable to participate in goal setting and plan of care. Eval Complexity: History: HIGH Complexity :3+ comorbidities / personal factors will impact the outcome/ POC Exam:LOW Complexity : 1-2 Standardized tests and measures addressing body structure, function, activity limitation and / or participation in recreation  Presentation: MEDIUM Complexity : Evolving with changing characteristics  Clinical Decision Making:Medium Complexity amb 30ft with min HHA, wheezing throughout session Overall Complexity:MEDIUM    G-Codes (GP)  Mobility  [id number] Current  CJ= 20-39%   Goal  CI= 1-19%    The severity rating is based on the functional mobility assessment.     Thank you for this referral.  Yusra Mascorro, PT   Time Calculation: 23 mins

## 2018-04-21 NOTE — PROGRESS NOTES
Shift Summary :  Productive cough with dyspnea on exertion. Prn nebs ordered and given by respiratory without a lot of immediate relief. Later asked if an antianxiety medication might help. Said he had used xanax for a short time and it seemed to help.

## 2018-04-21 NOTE — PROGRESS NOTES
Coughing productively. Complains of shortness of breath. Breathing coarse and noisy. Dr. Hany Jain called for prn nebulizer. Respiratory notified of patient's need to get treatment.

## 2018-04-21 NOTE — ROUTINE PROCESS
TRANSFER - IN REPORT:    Verbal report received from GREGORIO Sanabria RN (name) on [facility] Gar  being received from  Emergency Dept. (unit) for routine progression of care      Report consisted of patients Situation, Background, Assessment and   Recommendations(SBAR). Information from the following report(s) SBAR, Kardex, ED Summary, Recent Results and Med Rec Status was reviewed with the receiving nurse. Opportunity for questions and clarification was provided. Assessment completed upon patients arrival to unit and care assumed.

## 2018-04-21 NOTE — ROUTINE PROCESS
Bedside and Verbal shift change report given to Shari Cardenas RN  (oncoming nurse) by  PAUL Wrigth RN  (offgoing nurse). Report included the following information SBAR, Kardex, Recent Results and Med Rec Status.

## 2018-04-22 LAB
ANION GAP SERPL CALC-SCNC: 9 MMOL/L (ref 3–18)
ATRIAL RATE: 90 BPM
BASOPHILS # BLD: 0 K/UL (ref 0–0.1)
BASOPHILS NFR BLD: 0 % (ref 0–3)
BUN SERPL-MCNC: 27 MG/DL (ref 7–18)
BUN/CREAT SERPL: 20 (ref 12–20)
CALCIUM SERPL-MCNC: 9.1 MG/DL (ref 8.5–10.1)
CALCULATED P AXIS, ECG09: 53 DEGREES
CALCULATED R AXIS, ECG10: 36 DEGREES
CALCULATED T AXIS, ECG11: 58 DEGREES
CHLORIDE SERPL-SCNC: 97 MMOL/L (ref 100–108)
CO2 SERPL-SCNC: 27 MMOL/L (ref 21–32)
CREAT SERPL-MCNC: 1.36 MG/DL (ref 0.6–1.3)
DIAGNOSIS, 93000: NORMAL
DIFFERENTIAL METHOD BLD: ABNORMAL
EOSINOPHIL # BLD: 0 K/UL (ref 0–0.4)
EOSINOPHIL NFR BLD: 0 % (ref 0–5)
ERYTHROCYTE [DISTWIDTH] IN BLOOD BY AUTOMATED COUNT: 13 % (ref 11.6–14.5)
GLUCOSE BLD STRIP.AUTO-MCNC: 121 MG/DL (ref 70–110)
GLUCOSE BLD STRIP.AUTO-MCNC: 122 MG/DL (ref 70–110)
GLUCOSE BLD STRIP.AUTO-MCNC: 132 MG/DL (ref 70–110)
GLUCOSE BLD STRIP.AUTO-MCNC: 161 MG/DL (ref 70–110)
GLUCOSE SERPL-MCNC: 138 MG/DL (ref 74–99)
HCT VFR BLD AUTO: 35.7 % (ref 36–48)
HGB BLD-MCNC: 11.9 G/DL (ref 13–16)
LYMPHOCYTES # BLD: 0.8 K/UL (ref 0.8–3.5)
LYMPHOCYTES NFR BLD: 6 % (ref 20–51)
MAGNESIUM SERPL-MCNC: 2 MG/DL (ref 1.6–2.6)
MCH RBC QN AUTO: 29.6 PG (ref 24–34)
MCHC RBC AUTO-ENTMCNC: 33.3 G/DL (ref 31–37)
MCV RBC AUTO: 88.8 FL (ref 74–97)
MONOCYTES # BLD: 0.4 K/UL (ref 0–1)
MONOCYTES NFR BLD: 3 % (ref 2–9)
NEUTS BAND NFR BLD MANUAL: 1 % (ref 0–5)
NEUTS SEG # BLD: 12.1 K/UL (ref 1.8–8)
NEUTS SEG NFR BLD: 90 % (ref 42–75)
P-R INTERVAL, ECG05: 166 MS
PLATELET # BLD AUTO: 343 K/UL (ref 135–420)
PMV BLD AUTO: 9.3 FL (ref 9.2–11.8)
POTASSIUM SERPL-SCNC: 3.9 MMOL/L (ref 3.5–5.5)
Q-T INTERVAL, ECG07: 376 MS
QRS DURATION, ECG06: 78 MS
QTC CALCULATION (BEZET), ECG08: 459 MS
RBC # BLD AUTO: 4.02 M/UL (ref 4.7–5.5)
RBC MORPH BLD: ABNORMAL
SODIUM SERPL-SCNC: 133 MMOL/L (ref 136–145)
VENTRICULAR RATE, ECG03: 90 BPM
WBC # BLD AUTO: 13.4 K/UL (ref 4.6–13.2)

## 2018-04-22 PROCEDURE — 82962 GLUCOSE BLOOD TEST: CPT

## 2018-04-22 PROCEDURE — 97530 THERAPEUTIC ACTIVITIES: CPT

## 2018-04-22 PROCEDURE — 97116 GAIT TRAINING THERAPY: CPT

## 2018-04-22 PROCEDURE — 74011250636 HC RX REV CODE- 250/636: Performed by: INTERNAL MEDICINE

## 2018-04-22 PROCEDURE — 74011636637 HC RX REV CODE- 636/637: Performed by: INTERNAL MEDICINE

## 2018-04-22 PROCEDURE — 85025 COMPLETE CBC W/AUTO DIFF WBC: CPT | Performed by: INTERNAL MEDICINE

## 2018-04-22 PROCEDURE — 83735 ASSAY OF MAGNESIUM: CPT | Performed by: INTERNAL MEDICINE

## 2018-04-22 PROCEDURE — 65660000000 HC RM CCU STEPDOWN

## 2018-04-22 PROCEDURE — 74011000250 HC RX REV CODE- 250: Performed by: INTERNAL MEDICINE

## 2018-04-22 PROCEDURE — 94640 AIRWAY INHALATION TREATMENT: CPT

## 2018-04-22 PROCEDURE — 94760 N-INVAS EAR/PLS OXIMETRY 1: CPT

## 2018-04-22 PROCEDURE — 77010033678 HC OXYGEN DAILY

## 2018-04-22 PROCEDURE — 74011250637 HC RX REV CODE- 250/637: Performed by: INTERNAL MEDICINE

## 2018-04-22 PROCEDURE — 80048 BASIC METABOLIC PNL TOTAL CA: CPT | Performed by: INTERNAL MEDICINE

## 2018-04-22 PROCEDURE — 36415 COLL VENOUS BLD VENIPUNCTURE: CPT | Performed by: INTERNAL MEDICINE

## 2018-04-22 RX ADMIN — WATER 1 G: 1 INJECTION INTRAMUSCULAR; INTRAVENOUS; SUBCUTANEOUS at 22:13

## 2018-04-22 RX ADMIN — Medication 5 ML: at 14:00

## 2018-04-22 RX ADMIN — SODIUM CHLORIDE 500 MG: 900 INJECTION, SOLUTION INTRAVENOUS at 21:17

## 2018-04-22 RX ADMIN — GUAIFENESIN 600 MG: 600 TABLET, EXTENDED RELEASE ORAL at 22:14

## 2018-04-22 RX ADMIN — BUDESONIDE 500 MCG: 0.5 INHALANT RESPIRATORY (INHALATION) at 19:27

## 2018-04-22 RX ADMIN — IPRATROPIUM BROMIDE AND ALBUTEROL SULFATE 3 ML: .5; 3 SOLUTION RESPIRATORY (INHALATION) at 11:13

## 2018-04-22 RX ADMIN — CHLORTHALIDONE 25 MG: 25 TABLET ORAL at 09:30

## 2018-04-22 RX ADMIN — ALBUTEROL SULFATE 2.5 MG: 2.5 SOLUTION RESPIRATORY (INHALATION) at 22:28

## 2018-04-22 RX ADMIN — HEPARIN SODIUM 5000 UNITS: 5000 INJECTION, SOLUTION INTRAVENOUS; SUBCUTANEOUS at 17:28

## 2018-04-22 RX ADMIN — INSULIN LISPRO 2 UNITS: 100 INJECTION, SOLUTION INTRAVENOUS; SUBCUTANEOUS at 10:19

## 2018-04-22 RX ADMIN — HEPARIN SODIUM 5000 UNITS: 5000 INJECTION, SOLUTION INTRAVENOUS; SUBCUTANEOUS at 06:01

## 2018-04-22 RX ADMIN — HEPARIN SODIUM 5000 UNITS: 5000 INJECTION, SOLUTION INTRAVENOUS; SUBCUTANEOUS at 22:14

## 2018-04-22 RX ADMIN — ALBUTEROL SULFATE 2.5 MG: 2.5 SOLUTION RESPIRATORY (INHALATION) at 17:39

## 2018-04-22 RX ADMIN — ARFORMOTEROL TARTRATE 15 MCG: 15 SOLUTION RESPIRATORY (INHALATION) at 07:23

## 2018-04-22 RX ADMIN — ARFORMOTEROL TARTRATE 15 MCG: 15 SOLUTION RESPIRATORY (INHALATION) at 19:27

## 2018-04-22 RX ADMIN — IPRATROPIUM BROMIDE AND ALBUTEROL SULFATE 3 ML: .5; 3 SOLUTION RESPIRATORY (INHALATION) at 19:27

## 2018-04-22 RX ADMIN — METHYLPREDNISOLONE SODIUM SUCCINATE 40 MG: 125 INJECTION, POWDER, FOR SOLUTION INTRAMUSCULAR; INTRAVENOUS at 09:31

## 2018-04-22 RX ADMIN — GUAIFENESIN 600 MG: 600 TABLET, EXTENDED RELEASE ORAL at 09:30

## 2018-04-22 RX ADMIN — IPRATROPIUM BROMIDE AND ALBUTEROL SULFATE 3 ML: .5; 3 SOLUTION RESPIRATORY (INHALATION) at 15:17

## 2018-04-22 RX ADMIN — ALBUTEROL SULFATE 2.5 MG: 2.5 SOLUTION RESPIRATORY (INHALATION) at 03:26

## 2018-04-22 RX ADMIN — METHYLPREDNISOLONE SODIUM SUCCINATE 40 MG: 125 INJECTION, POWDER, FOR SOLUTION INTRAMUSCULAR; INTRAVENOUS at 22:14

## 2018-04-22 RX ADMIN — HYDROCODONE POLISTIREX AND CHLORPHENIRAMINE POLISTIREX 5 ML: 10; 8 SUSPENSION, EXTENDED RELEASE ORAL at 22:12

## 2018-04-22 RX ADMIN — Medication 10 ML: at 06:01

## 2018-04-22 RX ADMIN — IPRATROPIUM BROMIDE AND ALBUTEROL SULFATE 3 ML: .5; 3 SOLUTION RESPIRATORY (INHALATION) at 07:22

## 2018-04-22 RX ADMIN — HYDROCODONE POLISTIREX AND CHLORPHENIRAMINE POLISTIREX 5 ML: 10; 8 SUSPENSION, EXTENDED RELEASE ORAL at 04:35

## 2018-04-22 RX ADMIN — TAMSULOSIN HYDROCHLORIDE 0.4 MG: 0.4 CAPSULE ORAL at 09:30

## 2018-04-22 RX ADMIN — Medication 10 ML: at 22:14

## 2018-04-22 RX ADMIN — BUDESONIDE 500 MCG: 0.5 INHALANT RESPIRATORY (INHALATION) at 07:23

## 2018-04-22 NOTE — PROGRESS NOTES
Problem: Mobility Impaired (Adult and Pediatric)  Goal: *Acute Goals and Plan of Care (Insert Text)  Physical Therapy Goals  Initiated 4/21/18 to be met in 5-7 days  STG/LTG's:  1. Sit to stand and stand to sit w/ UE use S to improve functional I.  2. Standing balance good with UE support to improve balance for ADLs/ambulation. 3. Activity tolerance: Tolerate 10 minutes of activity with SpO2 >/=93% for improved efficiency with motor tasks. 4. Gait: Ambulate >/=100ft S/LRAD, O2 saturation > 90% on RA, for improved mobility in environment. 5. Patient Education: Independent with HEP and energy conservation techniques for improved endurance for home/facility discharge. Outcome: Progressing Towards Goal  physical Therapy TREATMENT    Patient: Yana Franklin (2 y.o. male)  Date: 4/22/2018  Diagnosis: Status asthmaticus with COPD (chronic obstructive pulmonary disease) ([facility])  Status asthmaticus with COPD (chronic obstructive pulmonary disease) ([facility])  COPD (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 ([facility])  PVC's (premature ventricular contractions) Status asthmaticus with COPD (chronic obstructive pulmonary disease) ([facility])  Precautions: Fall   Chart, physical therapy assessment, plan of care and goals were reviewed. ASSESSMENT:  Pt agreeable to participate. O2 on RA 95%. Pt amb [de-identified]' with HHA CGA. No LOB. Moderate fatigue noted. Increase coughing post ambulation. O2 93%. Reapplied O2 1L. Progression toward goals:  [x]      Improving appropriately and progressing toward goals  []      Improving slowly and progressing toward goals  []      Not making progress toward goals and plan of care will be adjusted     PLAN:  Patient continues to benefit from skilled intervention to address the above impairments. Continue treatment per established plan of care.   Discharge Recommendations:  Home Health  Further Equipment Recommendations for Discharge:  N/A     SUBJECTIVE:   Patient stated  I dont use a walker      OBJECTIVE DATA SUMMARY:   Critical Behavior:  Neurologic State: Appropriate for age, Alert  Orientation Level: Oriented X4  Cognition: Follows commands, Appropriate safety awareness, Appropriate for age attention/concentration, Appropriate decision making  Functional Mobility Training:  Bed Mobility:  Supine to Sit: Supervision; Additional time  Scooting: Supervision  Transfers:  Sit to Stand: Contact guard assistance  Stand to Sit: Contact guard assistance    Balance:  Sitting: Intact  Standing: Intact; With support  Ambulation/Gait Training:  Distance (ft): 80 Feet (ft)  Assistive Device: Gait belt (HHA)  Ambulation - Level of Assistance: Contact guard assistance  Gait Abnormalities: Decreased step clearance  Base of Support: Widened  Speed/Lisbet: Slow  Step Length: Right shortened;Left shortened  Activity Tolerance:   Fair     After treatment:   [x] Patient left in no apparent distress sitting up in chair  [] Patient left in no apparent distress in bed  [x] Call bell left within reach  [x] Nursing notified  [] Caregiver present  [] Bed alarm activated      Kerry Bellamy PTA   Time Calculation: 23 mins

## 2018-04-22 NOTE — PROGRESS NOTES
PATIENT C/O SOB AFTER WORKING WITH PT.  PRN ALBUTEROL NEB TX GIVEN. BS BILATERAL EXP WHEEZES PRE AND POST TX BUT PATIENT STATED SOME RELIEF.

## 2018-04-22 NOTE — ROUTINE PROCESS
Bedside shift change report given to Shaq Ramos (oncoming nurse) by Lorna Domingo RN (offgoing nurse). Report included the following information SBAR, Kardex, MAR, Recent Results, Cardiac Rhythm NSR with PVC and Alarm Parameters .

## 2018-04-22 NOTE — PROGRESS NOTES
Problem: Falls - Risk of  Goal: *Absence of Falls  Document Darell Fall Risk and appropriate interventions in the flowsheet.    Outcome: Progressing Towards Goal  Fall Risk Interventions:  Mobility Interventions: Patient to call before getting OOB         Medication Interventions: Evaluate medications/consider consulting pharmacy, Patient to call before getting OOB, Teach patient to arise slowly    Elimination Interventions: Bed/chair exit alarm, Call light in reach, Patient to call for help with toileting needs

## 2018-04-22 NOTE — ROUTINE PROCESS
Bedside and Verbal shift change report given to Jasmyn Brown RN (oncoming nurse) by Ilir Fernández RN (offgoing nurse). Report included the following information SBAR, Kardex, Procedure Summary, Intake/Output, MAR and Accordion.

## 2018-04-22 NOTE — PROGRESS NOTES
2024- Telemetry monitor called to say that pt is having bijeminal, trijeminal and something multifocal PVC's.  0415- Pt still coughing despite nebulizer treatment and requesting cough medication. Medication not due yet, paged Dr. Rafael Nicholson. 0430- Permission given by MD to give pt dose of PRN cough medication now. Shift Summary- Pt with audible wheezing throughout the night. PRN neb treatment given.      Patient Vitals for the past 12 hrs:   Temp Pulse Resp BP SpO2   04/22/18 0327 - - - - 90 %   04/22/18 0306 97.6 °F (36.4 °C) 95 30 145/87 95 %   04/21/18 2353 97.5 °F (36.4 °C) 90 22 136/72 97 %   04/21/18 1955 97.7 °F (36.5 °C) 96 27 147/66 93 %   04/21/18 1916 - - - - 91 %

## 2018-04-22 NOTE — PROGRESS NOTES
[facility] [place] [place] [facility]                  Pulmonary, Critical Care, and Sleep Medicine     Name: Tonya Sullivan MRN: [id number]   : 1945 Hospital: [facility] [place]    Date: 2018  Room: 304     [facility] Note                                              Consult requesting physician: Dr. Kaila Cortes  Reason for Consult: AE-COPD    IMPRESSION:   · AE-COPD  · Chronic dystrophic pleural based mass like fluid collection with heterogenous complex internal attenuation right lung, suggesting sequelae of chronic granulomatous process. On CT 17. · CKD  · Asbestos exposure  · Prostate cancer. · Smoker     Echo 3/22/17: LVEF 55-60%, 1 DD. RECOMMENDATIONS:   Dyspnea and wheezing, improved after changing bronchodilator to nebs and mucinex. No fever. Mild leucocytosis now, likely due to steroids  Sputum cx 18: many GPC in pairs and chairs. Few GPR. C/w pulmicort neb bid and brovana neb bid  C/w duone qid for today. May change to atrovent qid in am.   C/w O2 via NC. Follow sputum cx  C/w rocephin and zithromax. C/w steroids, sollumedrol to 40 mg q12. May taper in am if wheezing continue to improve. Stop smoking. Will need home o2 eval when ready for discharge. Outpatient PFT    CT findings are concerning for asbestos exposure with its consequences. Has worked in [employer]. Right lung volume loss and loculated right effusion. No symptoms of mesothelioma or active malignancy. Will need outpatient repeat CT.     FiO2 to keep SpO2 >=92%, HOB >=30 degree, aspiration precautions, aggressive pulmonary toileting, incentive spirometry, PT/OT eval and treat, mobilization. GI Prophylaxis: low risk for stress ulcer  DVT Prophylaxis: per primary team heparin  Smoking cessation counseling done by me     Other issues management by primary team and respective consultants.   Further recommendations will be based on the patient's response to recommended treatment and results of the investigation ordered. Quality Care: PPI, DVT prophylaxis, HOB elevated, infection control all reviewed and addressed. Discussed with patient, radiologic work up showed, answered all questions to their satisfaction. Care plan discussed with nursing, Dr. Steffany Gutierrez. Subjective/History:     Estrella Felty is a 74 y.o. male with PMHx significant for asbestos exposure, prostate cancer, HTN, smoker, admitted with AE-COPD. CT showed chronic RUL density suggestive of chronic granulomatous sequale. Occupations hx: has worked in [employer] Hx: active long time smoker. FMHx: no lung cancer. 4/22/18:  Feels better  Reduced dyspnea and wheezing  Had walked in the room with o2 yesterday with reduced KUMAR  Cough improved, reduced expectoration with bronchodilator change and mucinex  No fever chills cp leg edema  No overnight issues      Immunization status:   Immunization History   Administered Date(s) Administered    Influenza Vaccine 01/15/2017    Influenza Vaccine PF 10/06/2014    Pneumococcal Vaccine (Unspecified Type) 10/01/2012, 10/01/2012      Allergies   Allergen Reactions    Aspirin Other (comments)     \"messes my stomach up\"        Past Medical History:   Diagnosis Date    Cancer ([address]     prostate    COPD [facility]chronic obstructive pulmonary disease) ([address]     Hypertension     Pneumonia     Radiation effect         Past Surgical History:   Procedure Laterality Date    HX HERNIA REPAIR          Family History   Problem Relation Age of Onset    Heart Disease Mother         Social History   Substance Use Topics    Smoking status: Current Some Day Smoker     Types: Cigarettes    Smokeless tobacco: Never Used    Alcohol use No        Prior to Admission medications    Medication Sig Start Date End Date Taking? Authorizing Provider   ipratropium (ATROVENT) 0.03 % nasal spray 2 Sprays every twelve (12) hours.    Yes Phys Other, MD   tamsulosin (FLOMAX) 0.4 mg capsule Take 0.4 mg by mouth daily. Yes Blayne Bran MD   predniSONE (DELTASONE) 10 mg tablet 50mg po daily x 5 days, 40mg po daily x 5 days, 30mg po daily x 5 days, 20mg po daily x 5 days, 10mg po daily x 5 days. Disp: 75 tabs 4/20/18  Yes Davina Salmeron MD   albuterol (PROVENTIL VENTOLIN) 2.5 mg /3 mL (0.083 %) nebulizer solution 3 mL by Nebulization route every four (4) hours as needed for Wheezing. 12/23/15  Yes TRINA Garcia   budesonide-formoterol (SYMBICORT) 160-4.5 mcg/actuation HFA inhaler Take 2 Puffs by inhalation two (2) times a day. 12/23/15  Yes TRINA Garcia   predniSONE (DELTASONE) 50 mg tablet Take 1 Tab by mouth daily (with lunch). 3/24/17   Vale Mo MD   chlorthalidone (HYGROTEN) 25 mg tablet Take  by mouth daily. Blayne Bran MD   albuterol (PROVENTIL HFA, VENTOLIN HFA, PROAIR HFA) 90 mcg/actuation inhaler Take 2 Puffs by inhalation every four (4) hours as needed for Wheezing or Shortness of Breath.  5/19/15   TRINA Jacobson   Nebulizer & Compressor machine Dispense: 1 nebulizer machine w all tubing necessary 10/6/14   Cheral Oz, DO       Current Facility-Administered Medications   Medication Dose Route Frequency    albuterol (PROVENTIL VENTOLIN) nebulizer solution 2.5 mg  2.5 mg Nebulization Q4H PRN    albuterol-ipratropium (DUO-NEB) 2.5 MG-0.5 MG/3 ML  3 mL Nebulization QID RT    methylPREDNISolone (PF) (SOLU-MEDROL) injection 40 mg  40 mg IntraVENous Q12H    arformoterol (BROVANA) neb solution 15 mcg  15 mcg Nebulization BID RT    budesonide (PULMICORT) 500 mcg/2 ml nebulizer suspension  500 mcg Nebulization BID RT    sodium chloride (NS) flush 5-10 mL  5-10 mL IntraVENous Q8H    sodium chloride (NS) flush 5-10 mL  5-10 mL IntraVENous PRN    cefTRIAXone (ROCEPHIN) 1 g in sterile water (preservative free) 10 mL IV syringe  1 g IntraVENous Q24H    azithromycin (ZITHROMAX) 500 mg in 0.9% sodium chloride 250 mL IVPB  500 mg IntraVENous Q24H    heparin (porcine) injection 5,000 Units  5,000 Units SubCUTAneous Q8H    guaiFENesin ER (MUCINEX) tablet 600 mg  600 mg Oral BID    chlorpheniramine-HYDROcodone (TUSSIONEX) oral suspension 5 mL  5 mL Oral Q12H PRN    chlorthalidone (HYGROTEN) tablet 25 mg  25 mg Oral DAILY    tamsulosin (FLOMAX) capsule 0.4 mg  0.4 mg Oral DAILY    insulin lispro (HUMALOG) injection   SubCUTAneous AC&HS    glucose chewable tablet 16 g  4 Tab Oral PRN    glucagon (GLUCAGEN) injection 1 mg  1 mg IntraMUSCular PRN    dextrose (D50W) injection syrg 12.5-25 g  25-50 mL IntraVENous PRN         Objective:   Vital Signs:    Visit Vitals    /78 (BP 1 Location: Right arm, BP Patient Position: At rest)    Pulse 84    Temp 97.8 °F (36.6 °C)    Resp 27    Ht 5' 8\" (1.727 m)    Wt 96.8 kg (213 lb 4.8 oz)    SpO2 95%    BMI 32.43 kg/m2       O2 Device: Nasal cannula   O2 Flow Rate (L/min): 1 l/min   Temp (24hrs), Av.7 °F (36.5 °C), Min:97.5 °F (36.4 °C), Max:97.9 °F (36.6 °C)       Intake/Output:   Last shift:       07 - 1900  In: 240 [P.O.:240]  Out: 400 [Urine:400]  Last 3 shifts:  190 -  0700  In: 1000 [P.O.:480; I.V.:520]  Out: 1425 [Urine:1425]    Intake/Output Summary (Last 24 hours) at 18 1219  Last data filed at 18 1125   Gross per 24 hour   Intake              740 ml   Output              975 ml   Net             -235 ml       Physical Exam:     General/Neurology: Alert, Awake, NAD  Head:   Normocephalic, without obvious abnormality, atraumatic. Eye:   EOM intact, no scleral icterus, no pallor, no cyanosis. Nose:   No discharge  Throat:  Lips, mucosa, and tongue normal. No oral thrush. Neck:   Supple, symmetric. No carotid bruit. No lymphadenopathy. Trachea midline  Lung: Moderate air entry bilateral equal. No rales. No rhonchi. Expiratory wheezing b/l present but improving. No stridors. No prolongded expiration. No accessory muscle use. Heart:   Regular rate & rhythm. S1 S2 present. No murmur.   No JVD.  Abdomen:  Soft. NT. ND. +BS. Obese. Extremities:  Trace b/l pedal edema. No cyanosis. No clubbing. Pulses: 2+ and symmetric in DP. Lymphatic:  No cervical palpable lymphadenopathy. Data:       Recent Results (from the past 24 hour(s))   GLUCOSE, POC    Collection Time: 04/21/18  4:51 PM   Result Value Ref Range    Glucose (POC) 160 (H) 70 - 110 mg/dL   GLUCOSE, POC    Collection Time: 04/21/18  9:07 PM   Result Value Ref Range    Glucose (POC) 152 (H) 70 - 768 mg/dL   METABOLIC PANEL, BASIC    Collection Time: 04/22/18  3:25 AM   Result Value Ref Range    Sodium 133 (L) 136 - 145 mmol/L    Potassium 3.9 3.5 - 5.5 mmol/L    Chloride 97 (L) 100 - 108 mmol/L    CO2 27 21 - 32 mmol/L    Anion gap 9 3.0 - 18 mmol/L    Glucose 138 (H) 74 - 99 mg/dL    BUN 27 (H) 7.0 - 18 MG/DL    Creatinine 1.36 (H) 0.6 - 1.3 MG/DL    BUN/Creatinine ratio 20 12 - 20      GFR est AA >60 >60 ml/min/1.73m2    GFR est non-AA 51 (L) >60 ml/min/1.73m2    Calcium 9.1 8.5 - 10.1 MG/DL   CBC WITH AUTOMATED DIFF    Collection Time: 04/22/18  3:25 AM   Result Value Ref Range    WBC 13.4 (H) 4.6 - 13.2 K/uL    RBC 4.02 (L) 4.70 - 5.50 M/uL    HGB 11.9 (L) 13.0 - 16.0 g/dL    HCT 35.7 (L) 36.0 - 48.0 %    MCV 88.8 74.0 - 97.0 FL    MCH 29.6 24.0 - 34.0 PG    MCHC 33.3 31.0 - 37.0 g/dL    RDW 13.0 11.6 - 14.5 %    PLATELET 764 910 - 765 K/uL    MPV 9.3 9.2 - 11.8 FL    NEUTROPHILS 90 (H) 42 - 75 %    BAND NEUTROPHILS 1 0 - 5 %    LYMPHOCYTES 6 (L) 20 - 51 %    MONOCYTES 3 2 - 9 %    EOSINOPHILS 0 0 - 5 %    BASOPHILS 0 0 - 3 %    ABS. NEUTROPHILS 12.1 (H) 1.8 - 8.0 K/UL    ABS. LYMPHOCYTES 0.8 0.8 - 3.5 K/UL    ABS. MONOCYTES 0.4 0 - 1.0 K/UL    ABS. EOSINOPHILS 0.0 0.0 - 0.4 K/UL    ABS.  BASOPHILS 0.0 0.0 - 0.1 K/UL    RBC COMMENTS NORMOCYTIC, NORMOCHROMIC      DF MANUAL     MAGNESIUM    Collection Time: 04/22/18  3:25 AM   Result Value Ref Range    Magnesium 2.0 1.6 - 2.6 mg/dL   GLUCOSE, POC    Collection Time: 04/22/18  9:40 AM Result Value Ref Range    Glucose (POC) 161 (H) 70 - 110 mg/dL   GLUCOSE, POC    Collection Time: 04/22/18 11:27 AM   Result Value Ref Range    Glucose (POC) 121 (H) 70 - 110 mg/dL         Chemistry Recent Labs      04/22/18   0325  04/20/18   1655   GLU  138*  123*   NA  133*  135*   K  3.9  3.5   CL  97*  97*   CO2  27  26   BUN  27*  16   CREA  1.36*  1.51*   CA  9.1  9.0   MG  2.0  1.7   AGAP  9  12   BUCR  20  11*   AP   --   74   TP   --   6.6   ALB   --   3.0*   GLOB   --   3.6   AGRAT   --   0.8        Lactic Acid Lactic acid   Date Value Ref Range Status   10/02/2014 0.8 0.4 - 2.0 MMOL/L Final     No results for input(s): LAC in the last 72 hours. Liver Enzymes Protein, total   Date Value Ref Range Status   04/20/2018 6.6 6.4 - 8.2 g/dL Final     Albumin   Date Value Ref Range Status   04/20/2018 3.0 (L) 3.4 - 5.0 g/dL Final     Globulin   Date Value Ref Range Status   04/20/2018 3.6 2.0 - 4.0 g/dL Final     A-G Ratio   Date Value Ref Range Status   04/20/2018 0.8 0.8 - 1.7   Final     AST (SGOT)   Date Value Ref Range Status   04/20/2018 14 (L) 15 - 37 U/L Final     Alk. phosphatase   Date Value Ref Range Status   04/20/2018 74 45 - 117 U/L Final     Recent Labs      04/20/18   1655   TP  6.6   ALB  3.0*   GLOB  3.6   AGRAT  0.8   SGOT  14*   AP  74        CBC w/Diff Recent Labs      04/22/18   0325  04/20/18   1655   WBC  13.4*  9.4   RBC  4.02*  3.85*   HGB  11.9*  11.4*   HCT  35.7*  34.0*   PLT  343  285   GRANS  90*  72   LYMPH  6*  10*   EOS  0  10*        Cardiac Enzymes No results found for: CPK, CK, CKMMB, CKMB, RCK3, CKMBT, CKNDX, CKND1, WILLARD, TROPT, TROIQ, DENNYS, TROPT, TNIPOC, BNP, BNPP     BNP No results found for: BNP, BNPP, XBNPT     Coagulation No results for input(s): PTP, INR, APTT in the last 72 hours.     No lab exists for component: INREXT, INREXT      Thyroid  No results found for: T4, T3U, TSH, TSHEXT, TSHEXT    No results found for: T4     Urinalysis No results found for: COLOR, APPRN, SPGRU, REFSG, DEJA, PROTU, GLUCU, KETU, BILU, UROU, KALYN, LEUKU, GLUKE, EPSU, BACTU, WBCU, RBCU, CASTS, UCRY     Micro  Recent Labs      04/20/18   2330   CULT  PENDING     Recent Labs      04/20/18   2330   CULT  PENDING        ABG Recent Labs      04/20/18 2004   PHI  7.406   PCO2I  36.4   PO2I  78*   HCO3I  22.8   FIO2I  21        PFT       Ultrasound       LE Doppler       ECHO 3/22/17 Left ventricle: Systolic function was normal by visual assessment. Ejection fraction was estimated in the range of 55 % to 60 %. Poor  resolution of endocardial border. Doppler parameters were consistent with  abnormal left ventricular relaxation (grade 1 diastolic dysfunction). CT (Most Recent) (CT chest reviewed by me)   Results from Hospital Encounter encounter on 03/18/17   CT CHEST WO CONT   Narrative COMPARISON: Chest x-ray dated March 18, 2017    INDICATION: Blunt trauma. Right lower lobe mass. TECHNIQUE: Axial was performed through the chest without contrast.  Coronal and  sagittal reformations were generated. Dose reduction techniques used: Automated  exposure control, adjustment of the mAs and/or kVp according to patient's size,  and iterative reconstruction techniques.    ============    LUNGS and pleura: There is an ovoid peripherally calcified heterogeneous but  predominately hyperattenuating pleural-based mass along the anterior right upper  lobe which measures 2.6 x 1.8 cm transaxial by up to 4.9 cm craniocaudal. This  likely corresponds to the ovoid opacity described on the prior radiographs and  was present dating back to the earliest prior exam from 2008 although there are  progressive dystrophic calcifications. Additionally, there is a complex loculated pleural fluid collection with  dystrophic calcifications along the periphery as well as internal heterogeneous  high attenuation and scattered calcific debris.  The collection measures  approximately 9.1 x 3.7 cm transaxial by approximately 12.1 cm craniocaudal.  Additional scattered calcified pleural plaque is seen extending along the  posterior right upper lobe. Associated asymmetric volume loss in the right lung. There is mild dependent  pleural thickening inferiorly on the left. There are a few streaky bands of  scarring and atelectasis throughout the right lung. AIRWAY: Unremarkable. MEDIASTINUM: Normal heart size. No pericardial effusion. Great vessels are  unremarkable. No thoracic adenopathy. UPPER ABDOMEN: Scattered calcified granulomas throughout the liver. OTHER: No aggressive osseous lesions. Exaggerated thoracic kyphosis. Multilevel  spondylosis. The bones are osteopenic.    ============         Impression IMPRESSION:      1. Chronic dystrophic pleural-based masslike fluid collections with  heterogeneous complex internal attenuation suggesting sequela of a chronic  granulomatous process. The appearance is atypical for calcified pleural plaques  in the setting of asbestos-related pleural disease. Findings may represent a  form of a chronic empyema and correspond to the radiographic findings dating  back to at least 2008. XR (Most Recent). CXR  reviewed by me and compared with previous CXR   Results from Hospital Encounter encounter on 04/20/18   XR CHEST PORT   Narrative EXAM: AP portable upright chest    INDICATION: Shortness of breath    COMPARISON: March 18, 2017    _______________    FINDINGS:    Heart and mediastinal contours are normal. Chronic volume loss seen in the right  hemithorax. Chronic interstitial lung changes are present at the right lung  base.  No focal consolidation or effusion or pneumothorax.    _______________         Impression IMPRESSION:    No acute process             Rashmi Urrutia MD  4/22/2018

## 2018-04-22 NOTE — PROGRESS NOTES
Hospitalist Progress Note:    ASSESSMENT:   Principal Problem:    Status asthmaticus with COPD (chronic obstructive pulmonary disease) ([facility] (4/20/2018)    Active Problems:    PVC's (premature ventricular contractions) (4/20/2018)      COPD (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 ([facility] (4/20/2018)    HTN    BPH     PLAN:   1. Copd showing improvement. 2.  Continue resp meds. 3.  Follow bp.  4. Cont Flomax for BPH. CC:wheezing   SUBJECTIVE:      Reports improvement in breathing  ROS:    GEN:  No fever or chills. COR:  No cp or palps. Resp:  Chronic sob. GI:  No n/v. No abd pain. No constipation. No diarrhea. OBJECTIVE:   Vitals:   Visit Vitals    /62 (BP 1 Location: Right arm, BP Patient Position: At rest)    Pulse 82    Temp 97.9 °F (36.6 °C)    Resp 24    Ht 5' 8\" (1.727 m)    Wt 96.8 kg (213 lb 4.8 oz)    SpO2 97%    BMI 32.43 kg/m2       Gen:  Nad, AAOx3, Non-toxic. Heent: PERRLA, EOMI, NCAT. Cor:  S1S2 RRR, No M/R/G. Resp:  Improved exam.  Less wheezing. No distress   Abd:                 NT ND BS+  Ext:  No C/C/E.          Labs and Radiology:   Recent Labs      04/22/18   0325  04/20/18   1655   WBC  13.4*  9.4   HGB  11.9*  11.4*   HCT  35.7*  34.0*   PLT  343  285     Recent Labs      04/22/18   0325  04/20/18   1655   NA  133*  135*   K  3.9  3.5   CL  97*  97*   CO2  27  26   GLU  138*  123*   BUN  27*  16   CREA  1.36*  1.51*   CA  9.1  9.0              Dana Salazar M.D.

## 2018-04-22 NOTE — PROGRESS NOTES
Problem: Falls - Risk of  Goal: *Absence of Falls  Document Darell Fall Risk and appropriate interventions in the flowsheet.    Outcome: Progressing Towards Goal  Fall Risk Interventions:  Mobility Interventions: Patient to call before getting OOB         Medication Interventions: Patient to call before getting OOB, Teach patient to arise slowly    Elimination Interventions: Call light in reach, Patient to call for help with toileting needs

## 2018-04-23 ENCOUNTER — HOME HEALTH ADMISSION (OUTPATIENT)
Dept: HOME HEALTH SERVICES | Facility: HOME HEALTH | Age: 73
End: 2018-04-23

## 2018-04-23 VITALS
TEMPERATURE: 97.3 F | OXYGEN SATURATION: 94 % | BODY MASS INDEX: 31.81 KG/M2 | WEIGHT: 209.88 LBS | RESPIRATION RATE: 21 BRPM | DIASTOLIC BLOOD PRESSURE: 75 MMHG | SYSTOLIC BLOOD PRESSURE: 154 MMHG | HEART RATE: 74 BPM | HEIGHT: 68 IN

## 2018-04-23 LAB
ANION GAP SERPL CALC-SCNC: 7 MMOL/L (ref 3–18)
BUN SERPL-MCNC: 34 MG/DL (ref 7–18)
BUN/CREAT SERPL: 25 (ref 12–20)
CALCIUM SERPL-MCNC: 8.8 MG/DL (ref 8.5–10.1)
CHLORIDE SERPL-SCNC: 100 MMOL/L (ref 100–108)
CO2 SERPL-SCNC: 30 MMOL/L (ref 21–32)
CREAT SERPL-MCNC: 1.38 MG/DL (ref 0.6–1.3)
GLUCOSE BLD STRIP.AUTO-MCNC: 114 MG/DL (ref 70–110)
GLUCOSE BLD STRIP.AUTO-MCNC: 135 MG/DL (ref 70–110)
GLUCOSE SERPL-MCNC: 144 MG/DL (ref 74–99)
POTASSIUM SERPL-SCNC: 4.2 MMOL/L (ref 3.5–5.5)
SODIUM SERPL-SCNC: 137 MMOL/L (ref 136–145)

## 2018-04-23 PROCEDURE — 36415 COLL VENOUS BLD VENIPUNCTURE: CPT | Performed by: INTERNAL MEDICINE

## 2018-04-23 PROCEDURE — 94760 N-INVAS EAR/PLS OXIMETRY 1: CPT

## 2018-04-23 PROCEDURE — 77010033678 HC OXYGEN DAILY

## 2018-04-23 PROCEDURE — 74011250637 HC RX REV CODE- 250/637: Performed by: HOSPITALIST

## 2018-04-23 PROCEDURE — 74011250636 HC RX REV CODE- 250/636: Performed by: INTERNAL MEDICINE

## 2018-04-23 PROCEDURE — 94640 AIRWAY INHALATION TREATMENT: CPT

## 2018-04-23 PROCEDURE — 97110 THERAPEUTIC EXERCISES: CPT

## 2018-04-23 PROCEDURE — 82962 GLUCOSE BLOOD TEST: CPT

## 2018-04-23 PROCEDURE — 74011250637 HC RX REV CODE- 250/637: Performed by: INTERNAL MEDICINE

## 2018-04-23 PROCEDURE — 74011000250 HC RX REV CODE- 250: Performed by: INTERNAL MEDICINE

## 2018-04-23 PROCEDURE — 80048 BASIC METABOLIC PNL TOTAL CA: CPT | Performed by: INTERNAL MEDICINE

## 2018-04-23 PROCEDURE — 97535 SELF CARE MNGMENT TRAINING: CPT

## 2018-04-23 PROCEDURE — 97116 GAIT TRAINING THERAPY: CPT

## 2018-04-23 PROCEDURE — 97167 OT EVAL HIGH COMPLEX 60 MIN: CPT

## 2018-04-23 RX ORDER — POLYETHYLENE GLYCOL 3350 17 G/17G
17 POWDER, FOR SOLUTION ORAL DAILY
Status: DISCONTINUED | OUTPATIENT
Start: 2018-04-23 | End: 2018-04-23 | Stop reason: HOSPADM

## 2018-04-23 RX ORDER — PREDNISONE 20 MG/1
40 TABLET ORAL
Status: DISCONTINUED | OUTPATIENT
Start: 2018-04-24 | End: 2018-04-23 | Stop reason: HOSPADM

## 2018-04-23 RX ORDER — FAMOTIDINE 20 MG/1
20 TABLET, FILM COATED ORAL DAILY
Status: DISCONTINUED | OUTPATIENT
Start: 2018-04-24 | End: 2018-04-23 | Stop reason: HOSPADM

## 2018-04-23 RX ORDER — PREDNISONE 10 MG/1
TABLET ORAL
Qty: 20 TAB | Refills: 0 | Status: SHIPPED | OUTPATIENT
Start: 2018-04-23 | End: 2018-07-23

## 2018-04-23 RX ORDER — ALBUTEROL SULFATE 90 UG/1
2 AEROSOL, METERED RESPIRATORY (INHALATION)
Qty: 1 INHALER | Refills: 1 | Status: ON HOLD | OUTPATIENT
Start: 2018-04-23 | End: 2021-02-07 | Stop reason: SDUPTHER

## 2018-04-23 RX ORDER — AZITHROMYCIN 500 MG/1
500 TABLET, FILM COATED ORAL DAILY
Qty: 3 TAB | Refills: 0 | Status: SHIPPED | OUTPATIENT
Start: 2018-04-23 | End: 2018-04-26

## 2018-04-23 RX ADMIN — METHYLPREDNISOLONE SODIUM SUCCINATE 40 MG: 125 INJECTION, POWDER, FOR SOLUTION INTRAMUSCULAR; INTRAVENOUS at 09:22

## 2018-04-23 RX ADMIN — HEPARIN SODIUM 5000 UNITS: 5000 INJECTION, SOLUTION INTRAVENOUS; SUBCUTANEOUS at 06:23

## 2018-04-23 RX ADMIN — TAMSULOSIN HYDROCHLORIDE 0.4 MG: 0.4 CAPSULE ORAL at 09:23

## 2018-04-23 RX ADMIN — CHLORTHALIDONE 25 MG: 25 TABLET ORAL at 09:23

## 2018-04-23 RX ADMIN — GUAIFENESIN 600 MG: 600 TABLET, EXTENDED RELEASE ORAL at 09:23

## 2018-04-23 RX ADMIN — IPRATROPIUM BROMIDE AND ALBUTEROL SULFATE 3 ML: .5; 3 SOLUTION RESPIRATORY (INHALATION) at 15:13

## 2018-04-23 RX ADMIN — IPRATROPIUM BROMIDE AND ALBUTEROL SULFATE 3 ML: .5; 3 SOLUTION RESPIRATORY (INHALATION) at 11:15

## 2018-04-23 RX ADMIN — POLYETHYLENE GLYCOL 3350 17 G: 17 POWDER, FOR SOLUTION ORAL at 11:50

## 2018-04-23 RX ADMIN — ARFORMOTEROL TARTRATE 15 MCG: 15 SOLUTION RESPIRATORY (INHALATION) at 07:03

## 2018-04-23 RX ADMIN — IPRATROPIUM BROMIDE AND ALBUTEROL SULFATE 3 ML: .5; 3 SOLUTION RESPIRATORY (INHALATION) at 07:03

## 2018-04-23 RX ADMIN — BUDESONIDE 500 MCG: 0.5 INHALANT RESPIRATORY (INHALATION) at 07:03

## 2018-04-23 NOTE — PROGRESS NOTES
Transition of Care (MINI) Plan:  Chart reviewed, attended IDR. Pt for discharge today, likely needs oxygen, awaiting qualifying testing to arrange. H2H offered, pt agreeable, order and referral placed to [facility]. Pt will follow up with PCP and pulmonology. Wife at home to assist as needed per pt. MINI Transportation:     How is patient being transported at discharge? When? Is transport scheduled? Follow-up appointment and transportation:     PCP/Specialist?   See AVS     Time/Date? Who is transporting to the follow-up appointment? Is transport for follow up appointment scheduled? Communication plan (with patient/family): Who is being called? Patient or Next of Kin? Responsible party? What number(s) is to be used? What service provider is calling for [facility] services? [facility] for [facility] visit      When are they calling? Readmission Risk?   (Green/Low; Yellow/Moderate; Red/High):   yellow

## 2018-04-23 NOTE — ROUTINE PROCESS
Step 1)     Oxygen saturation at 93% on room air at rest.    If less than 88%: STOP! No further documentation needed; Otherwise proceeded to step 2 and 3      Step 2)     Oxygen saturation at 94% on room air with exertion     while walking for 6 minutes. Oxygen added to patient.       Step 3)     Oxygen saturation at 94% while walking on Oxygen at Room Air   Via:   [] Nasal Cannula         [] Simple Face Mask          [] Non-Re-Breather Mask        [] Partial Re-Breather Mask          [] Venturi Mask

## 2018-04-23 NOTE — PROGRESS NOTES
0730 bedside report with night shift RN, patient resting in bed. Patient sounded to be slightly grunting while exhaling, RN stated it was normal sound for him when he breathes. Patient hard of hearing, prefers us to speak loudly into Left ear. 0900 telemetry monitoring: Normal sinus rhythm, frequent PVC's.    1140 IDR, possible discharge today or tomorrow, PT will assess if oxygen is needed at home. 1200 administered Mirilax, patient has not had BM for the last 3 days. 1345 telemetry monitoring: Normal sinus rhythm, frequent PVC's    1500 patient did 6 minute walk, oxygen saturation 94% on room air, tolerated well.

## 2018-04-23 NOTE — PROGRESS NOTES
Problem: Self Care Deficits Care Plan (Adult)  Goal: *Acute Goals and Plan of Care (Insert Text)  Initial Occupational Therapy Goals (4/23/2018) Within 7 day(s):    1. Patient will perform grooming standing at sink with Supervision for 1-2 minutes for increased independence with ADLs. 2. Patient will perform UB dressing with setup for increased independence with ADLs. 3. Patient will perform LB dressing with setup & A/E PRN for increased independence with ADLs. 4. Patient will perform all aspects of toileting with Supervision/mod I for increased independence in ADLs  5. Patient will independently apply energy conservation techniques with 1 verbal cue(s) for increased independence with ADLs. 6. Patient will utilize good body mechanics during ADLs with 1 verbal cue(s). 7. Patient will independently manage O2 tubing to safely ambulate to/from bathroom. Outcome: Progressing Towards Goal  Occupational Therapy EVALUATION    Patient: Doc Pickett (42 y.o. male)  Date: 4/23/2018  Primary Diagnosis: Status asthmaticus with COPD (chronic obstructive pulmonary disease) ([facility])  Status asthmaticus with COPD (chronic obstructive pulmonary disease) ([facility])  COPD (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 ([facility])  PVC's (premature ventricular contractions)        Precautions:  Fall (O2)    ASSESSMENT :  Based on the objective data described below, the patient presents with decreased functional strength, decreased functional balance, decreased overall activity tolerance limiting independence with ADLs. Patient very pleasant and cooperative, but severely limited by [facility] for education as well as KUMAR this a.mZen Soriano Pt reports having Energy Conservation/Work Simplification Techniques instruction previously, but wasn't able to recall any strategies. When asked to perform LE dressing, pt w/ poor technique of forward flexion.  Pt w/ adequate L hip ROM to perform side sitting and even responded it was a lot easier to perform, but unable to complete on RLE w/o assist. Pt w/ significant KUMAR and unable to maintain standing this a.m. Provided LH sponge for increased independence in bathing d/t poor technique. Handout for Energy Conservation/Work Simplification Techniques. Pt would benefit from continued OT services to maximize independence in ADLs. Education: Patient instructed on home safety, body mechanics for optimal respiratory effort, Energy Conservation/Work Simplification Techniques, adaptive strategies and adaptive dressing techniques including clothing modifications with patient verbalizing understanding at this time. Patient will benefit from skilled intervention to address the above impairments. Patients rehabilitation potential is considered to be Good  Factors which may influence rehabilitation potential include:   []             None noted  []             Mental ability/status  [x]             Medical condition  [x]             Home/family situation and support systems  []             Safety awareness  []             Pain tolerance/management  []             Other:      PLAN :  Recommendations and Planned Interventions:  [x]               Self Care Training                  [x]        Therapeutic Activities  [x]               Functional Mobility Training    [x]        Cognitive Retraining  [x]               Therapeutic Exercises           [x]        Endurance Activities  [x]               Balance Training                   [x]        Neuromuscular Re-Education  []               Visual/Perceptual Training     [x]   Home Safety Training  [x]               Patient Education                 [x]        Family Training/Education  []               Other (comment):    Frequency/Duration: Patient will be followed by occupational therapy 1-2 times per day/4-7 days per week to address goals. Discharge Recommendations: Home Health; ? Pulmonary Rehab?   Further Equipment Recommendations for Discharge: shower chair and rollator     SUBJECTIVE:   Patient stated That's a lot easier.  (re: sock donning technique)    OBJECTIVE DATA SUMMARY:     Past Medical History:   Diagnosis Date    Cancer ([address]     prostate    COPD (chronic obstructive pulmonary disease) ([address]     Hypertension     Pneumonia     Radiation effect      Past Surgical History:   Procedure Laterality Date    HX HERNIA REPAIR       Barriers to Learning/Limitations: yes;  sensory deficits-vision/hearing/speech  Compensate with: visual, verbal, tactile, kinesthetic cues/model    Prior Level of Function/Home Situation: (Pt reports he mainly sits on his couch and watching TV)  Home Situation  Home Environment: Trailer/mobile home  # Steps to Enter: 5  Rails to Enter: Yes  Hand Rails : Bilateral  One/Two Story Residence: One story  Living Alone: No  Support Systems: Spouse/Significant Other/Partner  Patient Expects to be Discharged to[de-identified] Trailer/mobile home  Current DME Used/Available at Home: None  Tub or Shower Type: Shower  [x]  Right hand dominant   []  Left hand dominant    Cognitive/Behavioral Status:  Neurologic State: Alert  Orientation Level: Oriented X4  Cognition: Appropriate decision making; Appropriate for age attention/concentration; Appropriate safety awareness  Safety/Judgement: Awareness of environment    Skin: appears intact  Edema: no significant edema noted    Vision/Perceptual:    Corrective Lenses: Reading glasses    Coordination:  Coordination: Within functional limits  Fine Motor Skills-Upper: Left Intact; Right Intact    Gross Motor Skills-Upper: Left Intact; Right Intact    Balance:  Sitting: Intact  Standing: Intact; With support    Strength:  Strength: Generally decreased, functional  Tone & Sensation:  Tone: Normal  Sensation: Intact  Range of Motion:  AROM: Generally decreased, functional  PROM: Generally decreased, functional    Functional Mobility and Transfers for ADLs:  Transfers:  Sit to Stand: Contact guard assistance  Bed to Chair: Contact guard assistance   Toilet Transfer : Contact guard assistance;Minimum assistance   Bathroom Mobility: Contact guard assistance    ADL Assessment:   Feeding: Setup    Oral Facial Hygiene/Grooming: Contact guard assistance    Bathing: Minimum assistance; Moderate assistance; Additional time    Upper Body Dressing: Contact guard assistance    Lower Body Dressing: Minimum assistance; Additional time    Toileting: Contact guard assistance    ADL Intervention:  Feeding  Feeding Assistance: Supervision/set-up  Container Management: Supervision/set-up  Cutting Food: Supervision/set-up  Utensil Management: Supervision/set-up  Food to Mouth: Supervision/set-up  Drink to Mouth: Supervision/set-up    [address] Gown: Stand-by assistance    Lower Body Dressing Assistance  Socks: Minimum assistance  Position Performed: Bending forward method;Seated edge of bed (Instructed on side sitting)    Toileting  Toileting Assistance: Contact guard assistance;Minimum assistance  Bladder Hygiene: Contact guard assistance  Clothing Management: Contact guard assistance    Cognitive Retraining  Problem Solving: Identifying the task; Identifying the problem;General alternative solution;Deductive reason  Executive Functions: Executing cognitive plans  Organizing/Sequencing: Breaking task down;Prioritizing  Safety/Judgement: Awareness of environment  Cues: Tactile cues provided;Verbal cues provided;Visual cues provided    Pain:  Pre-treatment: 0/10  Post-treatment: 0/10    Activity Tolerance:   Patient able to stand <1 minute(s). Patient able to complete ADLs with frequent rest breaks. Patient limited by functional endurance and respiratory effort to sustain simple tasks. Patient unsteady     Please refer to the flowsheet for vital signs taken during this treatment.   After treatment:   [] Patient left in no apparent distress sitting up in chair  [x] Patient left in no apparent distress seated edge of bed  [x] Call perez left within reach  [x] Nursing notified  [] Caregiver present  [] Bed alarm activated    COMMUNICATION/EDUCATION:   [x] Home safety education was provided and the patient/caregiver indicated understanding. [x] Patient/family have participated as able in goal setting and plan of care. [x] Patient/family agree to work toward stated goals and plan of care. [] Patient understands intent and goals of therapy, but is neutral about his/her participation. [] Patient is unable to participate in goal setting and plan of care. Thank you for this referral.  Brina Lindsey, OTR/L  Time Calculation: 28 mins    G-Codes (GP)  Self Care   Current  CK= 40-59%   Goal  CI= 1-19%  The severity rating is based on the professional judgement & direct observation of Level of Assistance required for Functional Mobility and ADLs. Eval Complexity: History: HIGH Complexity : Extensive review of history including physical, cognitive and psychosocial history ; Examination: HIGH Complexity : 5 or more performance deficits relating to physical, cognitive , or psychosocial skils that result in activity limitations and / or participation restrictions; Decision Making:HIGH Complexity : Patient presents with comorbidities that affect occupational performance.  Signifigant modification of tasks or assistance (eg, physical or verbal) with assessment (s) is necessary to enable patient to complete evaluation

## 2018-04-23 NOTE — DISCHARGE INSTRUCTIONS
Chronic Obstructive Pulmonary Disease (COPD): Care Instructions  Your Care Instructions    Chronic obstructive pulmonary disease (COPD) is a general term for a group of lung diseases, including emphysema and chronic bronchitis. People with COPD have decreased airflow in and out of the lungs, which makes it hard to breathe. The airways also can get clogged with thick mucus. Cigarette smoking is a major cause of COPD. Although there is no cure for COPD, you can slow its progress. Following your treatment plan and taking care of yourself can help you feel better and live longer. Follow-up care is a key part of your treatment and safety. Be sure to make and go to all appointments, and call your doctor if you are having problems. It's also a good idea to know your test results and keep a list of the medicines you take. How can you care for yourself at home? ?Staying healthy  ? · Do not smoke. This is the most important step you can take to prevent more damage to your lungs. If you need help quitting, talk to your doctor about stop-smoking programs and medicines. These can increase your chances of quitting for good. ? · Avoid colds and flu. Get a pneumococcal vaccine shot. If you have had one before, ask your doctor whether you need a second dose. Get the flu vaccine every fall. If you must be around people with colds or the flu, wash your hands often. ? · Avoid secondhand smoke, air pollution, and high altitudes. Also avoid cold, dry air and hot, humid air. Stay at home with your windows closed when air pollution is bad. ?Medicines and oxygen therapy  ? · Take your medicines exactly as prescribed. Call your doctor if you think you are having a problem with your medicine. ? · You may be taking medicines such as:  ¨ Bronchodilators. These help open your airways and make breathing easier. Bronchodilators are either short-acting (work for 6 to 9 hours) or long-acting (work for 24 hours).  You inhale most bronchodilators, so they start to act quickly. Always carry your quick-relief inhaler with you in case you need it while you are away from home. ¨ Corticosteroids (prednisone, budesonide). These reduce airway inflammation. They come in pill or inhaled form. You must take these medicines every day for them to work well. ? · A spacer may help you get more inhaled medicine to your lungs. Ask your doctor or pharmacist if a spacer is right for you. If it is, ask how to use it properly. ? · Do not take any vitamins, over-the-counter medicine, or herbal products without talking to your doctor first.   ? · If your doctor prescribed antibiotics, take them as directed. Do not stop taking them just because you feel better. You need to take the full course of antibiotics. ? · Oxygen therapy boosts the amount of oxygen in your blood and helps you breathe easier. Use the flow rate your doctor has recommended, and do not change it without talking to your doctor first.   Activity  ? · Get regular exercise. Walking is an easy way to get exercise. Start out slowly, and walk a little more each day. ? · Pay attention to your breathing. You are exercising too hard if you cannot talk while you are exercising. ? · Take short rest breaks when doing household chores and other activities. ? · Learn breathing methods-such as breathing through pursed lips-to help you become less short of breath. ? · If your doctor has not set you up with a pulmonary rehabilitation program, talk to him or her about whether rehab is right for you. Rehab includes exercise programs, education about your disease and how to manage it, help with diet and other changes, and emotional support. Diet  ? · Eat regular, healthy meals. Use bronchodilators about 1 hour before you eat to make it easier to eat. Eat several small meals instead of three large ones. Drink beverages at the end of the meal. Avoid foods that are hard to chew.    ? · Eat foods that contain protein so that you do not lose muscle mass. ? · Talk with your doctor if you gain too much weight or if you lose weight without trying. ?Mental health  ? · Talk to your family, friends, or a therapist about your feelings. It is normal to feel frightened, angry, hopeless, helpless, and even guilty. Talking openly about bad feelings can help you cope. If these feelings last, talk to your doctor. When should you call for help? Call 911 anytime you think you may need emergency care. For example, call if:  ? · You have severe trouble breathing. ?Call your doctor now or seek immediate medical care if:  ? · You have new or worse trouble breathing. ? · You cough up blood. ? · You have a fever. ? Watch closely for changes in your health, and be sure to contact your doctor if:  ? · You cough more deeply or more often, especially if you notice more mucus or a change in the color of your mucus. ? · You have new or worse swelling in your legs or belly. ? · You are not getting better as expected. Where can you learn more? Go to http://ifeoma-jana.info/. Nick Quintero in the search box to learn more about \"Chronic Obstructive Pulmonary Disease (COPD): Care Instructions. \"  Current as of: May 12, 2017  Content Version: 11.4  © 5941-1471 [employer]. Care instructions adapted under license by [employer] (which disclaims liability or warranty for this information). If you have questions about a medical condition or this instruction, always ask your healthcare professional. [place] [address] any warranty or liability for your use of this information. Shortness of Breath: Care Instructions  Your Care Instructions  Shortness of breath has many causes. Sometimes conditions such as anxiety can lead to shortness of breath. Some people get mild shortness of breath when they exercise.  Trouble breathing also can be a symptom of a serious problem, such as asthma, lung disease, emphysema, heart problems, and pneumonia. If your shortness of breath continues, you may need tests and treatment. Watch for any changes in your breathing and other symptoms. Follow-up care is a key part of your treatment and safety. Be sure to make and go to all appointments, and call your doctor if you are having problems. It's also a good idea to know your test results and keep a list of the medicines you take. How can you care for yourself at home? · Do not smoke or allow others to smoke around you. If you need help quitting, talk to your doctor about stop-smoking programs and medicines. These can increase your chances of quitting for good. · Get plenty of rest and sleep. · Take your medicines exactly as prescribed. Call your doctor if you think you are having a problem with your medicine. · Find healthy ways to deal with stress. ¨ Exercise daily. ¨ Get plenty of sleep. ¨ Eat regularly and well. When should you call for help? Call 911 anytime you think you may need emergency care. For example, call if:  ? · You have severe shortness of breath. ? · You have symptoms of a heart attack. These may include:  ¨ Chest pain or pressure, or a strange feeling in the chest.  ¨ Sweating. ¨ Shortness of breath. ¨ Nausea or vomiting. ¨ Pain, pressure, or a strange feeling in the back, neck, jaw, or upper belly or in one or both shoulders or arms. ¨ Lightheadedness or sudden weakness. ¨ A fast or irregular heartbeat. After you call 911, the  may tell you to chew 1 adult-strength or 2 to 4 low-dose aspirin. Wait for an ambulance. Do not try to drive yourself. ?Call your doctor now or seek immediate medical care if:  ? · Your shortness of breath gets worse or you start to wheeze. Wheezing is a high-pitched sound when you breathe. ? · You wake up at night out of breath or have to prop your head up on several pillows to breathe.    ? · You are short of breath after only light activity or while at rest.   ? Watch closely for changes in your health, and be sure to contact your doctor if:  ? · You do not get better over the next 1 to 2 days. Where can you learn more? Go to http://ifeoma-jana.info/. Enter S780 in the search box to learn more about \"Shortness of Breath: Care Instructions. \"  Current as of: May 12, 2017  Content Version: 11.4  © 6827-8337 [employer]. Care instructions adapted under license by [employer] (which disclaims liability or warranty for this information). If you have questions about a medical condition or this instruction, always ask your healthcare professional. [address] any warranty or liability for your use of this information.

## 2018-04-23 NOTE — PROGRESS NOTES
Problem: Mobility Impaired (Adult and Pediatric)  Goal: *Acute Goals and Plan of Care (Insert Text)  Physical Therapy Goals  Initiated 4/21/18 to be met in 5-7 days  STG/LTG's:  1. Sit to stand and stand to sit w/ UE use S to improve functional I.  2. Standing balance good with UE support to improve balance for ADLs/ambulation. 3. Activity tolerance: Tolerate 10 minutes of activity with SpO2 >/=93% for improved efficiency with motor tasks. 4. Gait: Ambulate >/=100ft S/LRAD, O2 saturation > 90% on RA, for improved mobility in environment. 5. Patient Education: Independent with HEP and energy conservation techniques for improved endurance for home/facility discharge. Outcome: Progressing Towards Goal  physical Therapy TREATMENT    Patient: Adriana Durant (59 y.o. male)  Date: 4/23/2018  Diagnosis: Status asthmaticus with COPD (chronic obstructive pulmonary disease) ([facility])  Status asthmaticus with COPD (chronic obstructive pulmonary disease) ([facility])  COPD (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 ([facility])  PVC's (premature ventricular contractions) Status asthmaticus with COPD (chronic obstructive pulmonary disease) ([facility])       Precautions: Fall (O2)  Chart, physical therapy assessment, plan of care and goals were reviewed. ASSESSMENT:  Pt seen sitting at the EOB prior to session w/ supplemental O2 at 1L prior to session. Pt reported no pain prior to session. Pt's O2 levels assessed at 95 on supplemental O2 prior to session, however pt continues to report SOB. Pt able to perform therex activity prior to session. Pt has a productive cough during mobility task. Pt able to ambulate 120 ft w/ GB while pushing the O2 tank but required frequent standing rest breaks as pt reported decrease activity tolerance. Pt's O2 levels remained in the high 90s during mobility task. Pt left sitting at the EOB after session, call bell and tray in reach, nurse notified after session.  Pt may benefit from possibly a rollator as pt demonstrates frequent decrease activity tolerance, however pt may not be compliant with using it at this time. Progression toward goals:  [x]      Improving appropriately and progressing toward goals  []      Improving slowly and progressing toward goals  []      Not making progress toward goals and plan of care will be adjusted     PLAN:  Patient continues to benefit from skilled intervention to address the above impairments. Continue treatment per established plan of care. Discharge Recommendations:  Home Health  Further Equipment Recommendations for Discharge:  TBD     SUBJECTIVE:   Patient stated The doctor said there is no cure for my condition.     OBJECTIVE DATA SUMMARY:   Critical Behavior:  Neurologic State: Alert  Orientation Level: Oriented X4  Cognition: Appropriate decision making, Appropriate for age attention/concentration, Appropriate safety awareness  Safety/Judgement: Awareness of environment  Functional Mobility Training:  Bed Mobility:  Supine to Sit: Supervision  Sit to Supine: Supervision  Scooting: Supervision  Transfers:  Sit to Stand: Supervision  Stand to Sit: Supervision  Bed to Chair: Contact guard assistance  Balance:  Sitting: Intact  Standing: Intact; With support   Range Of Motion:   AROM: Generally decreased, functional   PROM: Generally decreased, functional  Ambulation/Gait Training:  Distance (ft): 120 Feet (ft)  Assistive Device: Gait belt  Ambulation - Level of Assistance: Stand-by assistance  Gait Abnormalities: Decreased step clearance  Base of Support: Widened  Speed/[place]: Slow  Step Length: Left shortened;Right shortened  Therapeutic Exercises:       EXERCISE   Sets   Reps   Active Active Assist   Passive Self ROM   Comments   Ankle Pumps 1 20  [x] [] [] []    Quad Sets/Glut Sets   [] [] [] []    Hamstring Sets   [] [] [] []    Short Arc Quads   [] [] [] []    Heel Slides   [] [] [] []    Straight Leg Raises   [] [] [] []    Hip Abd/Add 2 10 [x] [] [] [] Hold for 5 secs   Long Arc Quads 2 10 [x] [] [] []    Seated Marching 2 10 [x] [] [] []    Standing Marching   [] [] [] []       [] [] [] []      Pain:  Pain Scale 1: Numeric (0 - 10)  Pain Intensity 1: 0  Activity Tolerance:   Fair+  Please refer to the flowsheet for vital signs taken during this treatment.   After treatment:   [x] Patient left in no apparent distress sitting up in chair  [] Patient left in no apparent distress in bed  [x] Call bell left within reach  [x] Nursing notified  [] Caregiver present  [] Bed alarm activated      Dina Hoff PT   Time Calculation: 30 mins

## 2018-04-23 NOTE — DISCHARGE SUMMARY
Discharge Summary    Patient: Veronica Antony MRN: [id number]  CSN: [id number]    YOB: 1945  Age: 74 y.o. Sex: male    DOA: 4/20/2018 LOS:  LOS: 3 days   Discharge Date:      Primary Care Provider:  Benjamine Severs, MD    Admission Diagnoses: Status asthmaticus with COPD (chronic obstructive pulmonary disease) ([facility]  Status asthmaticus with COPD (chronic obstructive pulmonary disease) (HCC)  COPD (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 (HCC)  PVC's (premature ventricular contractions)    Discharge Diagnoses:    Problem List as of 4/23/2018  Date Reviewed: 4/20/2018          Codes Class Noted - Resolved    * (Principal)Status asthmaticus with COPD (chronic obstructive pulmonary disease) ([place] [address] ICD-10-CM: J44.9, J45.902  ICD-9-CM: 493.21  4/20/2018 - Present        PVC's (premature ventricular contractions) ICD-10-CM: I49.3  ICD-9-CM: 427.69  4/20/2018 - Present        COPD (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 ([facility] ICD-10-CM: J44.9  ICD-9-CM: 491.20  4/20/2018 - Present        Tobacco abuse (Chronic) ICD-10-CM: Z72.0  ICD-9-CM: 305.1  3/22/2017 - Present        COPD (chronic obstructive pulmonary disease) ([facility] ICD-10-CM: J44.9  ICD-9-CM: 496  10/2/2014 - Present        URIEL (acute kidney injury) ([facility] ICD-10-CM: N17.9  ICD-9-CM: 584.9  10/2/2014 - Present        Hypertension ICD-10-CM: I10  ICD-9-CM: 401.9  Unknown - Present        RESOLVED: Acute respiratory failure ([facility] ICD-10-CM: J96.00  ICD-9-CM: 518.81  10/2/2014 - 3/18/2017        RESOLVED: Pneumonia ICD-10-CM: J18.9  ICD-9-CM: 486  10/2/2014 - 3/18/2017              Discharge Medications:     Current Discharge Medication List      START taking these medications    Details   azithromycin (ZITHROMAX) 500 mg tab Take 1 Tab by mouth daily for 3 days.   Qty: 3 Tab, Refills: 0         CONTINUE these medications which have CHANGED    Details   albuterol (PROVENTIL HFA, VENTOLIN HFA, PROAIR HFA) 90 mcg/actuation inhaler Take 2 Puffs by inhalation every four (4) hours as needed for Wheezing or Shortness of Breath. Qty: 1 Inhaler, Refills: 1      predniSONE (DELTASONE) 10 mg tablet Days 1 & 2: Take FOUR tabs. Days 3 & 4: Take THREE tabs. Days 5 & 6: Take TWO tabs. Days 7 & 8: Take ONE tab. Qty: 20 Tab, Refills: 0         CONTINUE these medications which have NOT CHANGED    Details   ipratropium (ATROVENT) 0.03 % nasal spray 2 Sprays every twelve (12) hours. tamsulosin (FLOMAX) 0.4 mg capsule Take 0.4 mg by mouth daily. albuterol (PROVENTIL VENTOLIN) 2.5 mg /3 mL (0.083 %) nebulizer solution 3 mL by Nebulization route every four (4) hours as needed for Wheezing. Qty: 24 Each, Refills: 0      budesonide-formoterol (SYMBICORT) 160-4.5 mcg/actuation HFA inhaler Take 2 Puffs by inhalation two (2) times a day. Qty: 1 Inhaler, Refills: 0      chlorthalidone (HYGROTEN) 25 mg tablet Take  by mouth daily. Nebulizer & Compressor machine Dispense: 1 nebulizer machine w all tubing necessary  Qty: 1 each, Refills: 0             Discharge Condition: Stable    Procedures : None    Consults: Pulmonary/Critical Care      PHYSICAL EXAM    Visit Vitals    /75 (BP 1 Location: Right arm, BP Patient Position: At rest)    Pulse 74    Temp 97.3 °F (36.3 °C)    Resp 21    Ht 5' 8\" (1.727 m)    Wt 95.2 kg (209 lb 14.1 oz)    SpO2 94%    BMI 31.91 kg/m2     General: Awake, cooperative, no acute distress    HEENT: NC, Atraumatic. PERRLA, EOMI. Anicteric sclerae. Lungs:  Coarse lung sounds bilaterally, mild expiratory wheezing. No rhonchi or rales. Heart:  Regular  rhythm,  No murmur, No Rubs, No Gallops  Abdomen: Soft, Non distended, Non tender. +Bowel sounds,   Extremities: No c/c/e  Psych:   Not anxious or agitated. Neurologic:  No acute neurological deficits. Admission HPI : Fernanda Mcelroy is a 74 y.o.   male who has COPD, Asbestosis, Prostate Cancer  Presents with worsening cough and congestion   Dyspnea on exertion progressive over the last 3weeks   Has been on chronic po prednisone and duonebs at home with minimal relief   Cough with yellow green sputum production   In ER given several Duonbeb , Magnesium with minimal improvement   Xray no ilinfilitrate  abg no hypercapnia     Hospital Course : This patient was admitted to medical services on April 20, 2018 for an acute COPD exacerbation. The patient was started on IV zithromax, as well as neb treatments and supplemental oxygen. Over the course of his hospitalization, the patient's respiratory status improved and his supplemental oxygen requirements lessened. He worked with physical therapy and their recommendations were home health. The patient will be continued for 3 more days on Zithromax, as well as started on a steroid taper. He was instructed to follow up with his PCP as well as Dr Singh Martinez prior to discharge. He was instructed that he will need a repeat CT scan of his chest, as instructed by pulmonology. Prior to discharge the patient requested a refill on his albuterol.     Activity: PT/OT per Home Health    Diet: Cardiac Diet    Follow-up: PCP; Pulmonology - Dr Singh Martinez    Disposition: Home with Home Health, Stable condition    Minutes spent on discharge: 40       Labs: Results:       Chemistry Recent Labs      04/23/18   0335  04/22/18   0325  04/20/18   1655   GLU  144*  138*  123*   NA  137  133*  135*   K  4.2  3.9  3.5   CL  100  97*  97*   CO2  30  27  26   BUN  34*  27*  16   CREA  1.38*  1.36*  1.51*   CA  8.8  9.1  9.0   AGAP  7  9  12   BUCR  25*  20  11*   AP   --    --   74   TP   --    --   6.6   ALB   --    --   3.0*   GLOB   --    --   3.6   AGRAT   --    --   0.8      CBC w/Diff Recent Labs      04/22/18   0325  04/20/18   1655   WBC  13.4*  9.4   RBC  4.02*  3.85*   HGB  11.9*  11.4*   HCT  35.7*  34.0*   PLT  343  285   GRANS  90*  72   LYMPH  6*  10*   EOS  0  10*      Cardiac Enzymes Recent Labs      04/20/18   1655   CPK  112   CKND1  4.0      Coagulation No results for input(s): PTP, INR, APTT in the last 72 hours. No lab exists for component: INREXT    Lipid Panel No results found for: CHOL, CHOLPOCT, CHOLX, CHLST, CHOLV, 021382, HDL, LDL, LDLC, DLDLP, 099984, VLDLC, VLDL, TGLX, TRIGL, TRIGP, TGLPOCT, CHHD, CHHDX   BNP No results for input(s): BNPP in the last 72 hours. Liver Enzymes Recent Labs      04/20/18   1655   TP  6.6   ALB  3.0*   AP  74   SGOT  14*      Thyroid Studies No results found for: T4, T3U, TSH, TSHEXT         Significant Diagnostic Studies: Xr Chest Port    Result Date: 4/20/2018  EXAM: AP portable upright chest INDICATION: Shortness of breath COMPARISON: March 18, 2017 _______________ FINDINGS: Heart and mediastinal contours are normal. Chronic volume loss seen in the right hemithorax. Chronic interstitial lung changes are present at the right lung base. No focal consolidation or effusion or pneumothorax. _______________     IMPRESSION: No acute process         No results found for this or any previous visit.         CC: Jose Orta MD

## 2018-04-23 NOTE — ROUTINE PROCESS
Bedside shift change report given to Zeb Jo RN (oncoming nurse) by Cata Buenrostro RN (offgoing nurse). Report included the following information SBAR, Kardex, MAR, Recent Results, Cardiac Rhythm NSR with PVC and Alarm Parameters .

## 2018-04-23 NOTE — PROGRESS NOTES
2220- Paged respiratory to come assess pt. Shift Summary- Pt given PRN cough medications and breathing treatment during shift.       Patient Vitals for the past 12 hrs:   Temp Pulse Resp BP SpO2   04/23/18 0405 97.4 °F (36.3 °C) 73 20 158/83 97 %   04/22/18 2228 - - - - 93 %   04/22/18 2222 97.7 °F (36.5 °C) 83 21 147/75 93 %   04/22/18 1928 - - - - 95 %   04/22/18 1917 97.9 °F (36.6 °C) 72 21 139/73 97 %   04/22/18 1739 - - - - 97 %

## 2018-04-23 NOTE — PROGRESS NOTES
[facility]                  Pulmonary, Critical Care, and Sleep Medicine     Name: Beth Myers MRN: [id number]   : 1945 Hospital: [facility] [place]    Date: 2018  Room: 304     Pulmonary Note                                             Subjective/History:     Beth Myers is a 74 y.o. male with PMHx significant for asbestos exposure, prostate cancer, HTN, smoker, admitted with AE-COPD. CT showed chronic RUL density suggestive of chronic granulomatous sequale. Occupations hx: has worked in [employer] Hx: active long time smoker. Stopped smoking  this year; used to smoke 1 ppd for about 50 years. FMHx: no lung cancer.      18:  Patient breathing better  Cough less but is productive  No chest pain or hemoptysis    Immunization status:   Immunization History   Administered Date(s) Administered    Influenza Vaccine 01/15/2017    Influenza Vaccine PF 10/06/2014    Pneumococcal Vaccine (Unspecified Type) 10/01/2012, 10/01/2012      Allergies   Allergen Reactions    Aspirin Other (comments)     \"messes my stomach up\"        Past Medical History:   Diagnosis Date    Cancer ([address]     prostate    COPD (chronic obstructive pulmonary disease) ([address]     Hypertension     Pneumonia     Radiation effect         Social History   Substance Use Topics    Smoking status: Current Some Day Smoker     Types: Cigarettes    Smokeless tobacco: Never Used    Alcohol use No        Current Facility-Administered Medications   Medication Dose Route Frequency    polyethylene glycol (MIRALAX) packet 17 g  17 g Oral DAILY    albuterol (PROVENTIL VENTOLIN) nebulizer solution 2.5 mg  2.5 mg Nebulization Q4H PRN    albuterol-ipratropium (DUO-NEB) 2.5 MG-0.5 MG/3 ML  3 mL Nebulization QID RT    methylPREDNISolone (PF) (SOLU-MEDROL) injection 40 mg  40 mg IntraVENous Q12H    arformoterol (BROVANA) neb solution 15 mcg  15 mcg Nebulization BID RT    budesonide (PULMICORT) 500 mcg/2 ml nebulizer suspension  500 mcg Nebulization BID RT    sodium chloride (NS) flush 5-10 mL  5-10 mL IntraVENous Q8H    sodium chloride (NS) flush 5-10 mL  5-10 mL IntraVENous PRN    cefTRIAXone (ROCEPHIN) 1 g in sterile water (preservative free) 10 mL IV syringe  1 g IntraVENous Q24H    azithromycin (ZITHROMAX) 500 mg in 0.9% sodium chloride 250 mL IVPB  500 mg IntraVENous Q24H    heparin (porcine) injection 5,000 Units  5,000 Units SubCUTAneous Q8H    guaiFENesin ER (MUCINEX) tablet 600 mg  600 mg Oral BID    chlorpheniramine-HYDROcodone (TUSSIONEX) oral suspension 5 mL  5 mL Oral Q12H PRN    chlorthalidone (HYGROTEN) tablet 25 mg  25 mg Oral DAILY    tamsulosin (FLOMAX) capsule 0.4 mg  0.4 mg Oral DAILY    insulin lispro (HUMALOG) injection   SubCUTAneous AC&HS    glucose chewable tablet 16 g  4 Tab Oral PRN    glucagon (GLUCAGEN) injection 1 mg  1 mg IntraMUSCular PRN    dextrose (D50W) injection syrg 12.5-25 g  25-50 mL IntraVENous PRN       Review of Systems:  Ears, nose, mouth, throat, and face: No epistaxis, No nasal drainage  Respiratory: as above  Cardiovascular: no chest pain or palpitations  Gastrointestinal: no abd pain, vomitting or diarrhea  Genitourinary: No urinary symptoms  Integument/breast: No ulcers  Musculoskeletal:Neg  Neurological: No focal weakness or seizures or headaches  Behvioral/Psych: No anxiety or depression  Constitutional: No fever or chills or weight loss      Objective:   Vital Signs:    Visit Vitals    /75 (BP 1 Location: Right arm, BP Patient Position: At rest)    Pulse 74    Temp 97.3 °F (36.3 °C)    Resp 21    Ht 5' 8\" (1.727 m)    Wt 95.2 kg (209 lb 14.1 oz)    SpO2 94%    BMI 31.91 kg/m2       O2 Device: Nasal cannula   O2 Flow Rate (L/min): 1 l/min   Temp (24hrs), Av.7 °F (36.5 °C), Min:97.3 °F (36.3 °C), Max:98.4 °F (36.9 °C)       Intake/Output:   Last shift:       07 -  1900  In: -   Out: 300 [Urine:300]  Last 3 shifts: 04/21 1901 - 04/23 0700  In: 740 [P.O.:480; I.V.:260]  Out: 1950 [Urine:1950]    Intake/Output Summary (Last 24 hours) at 04/23/18 1452  Last data filed at 04/23/18 0754   Gross per 24 hour   Intake                0 ml   Output             1075 ml   Net            -1075 ml        Physical Exam:   Comfortable; on 1 lits nc; acyanotic  HEENT: pupils not dilated, reactive, no scleral jaundice  Neck: No adenopathy or thyroid swelling  CVS: S1S2 no murmurs; JVD not elevated  RS: Mod air entry bilaterally, minimal end expiratory wheezes, no crackles  Abd: soft, non tender, no hepatosplenomegaly, no abd distension  Neuro: non focal, awake, alert  Extrm: no leg edema or swelling or clubbing  Skin: no rash  Lymphatic: no cervical or supraclavicular adenopathy  Psych: normal mood      Data:       Chemistry Recent Labs      04/23/18   0335  04/22/18   0325  04/20/18   1655   GLU  144*  138*  123*   NA  137  133*  135*   K  4.2  3.9  3.5   CL  100  97*  97*   CO2  30  27  26   BUN  34*  27*  16   CREA  1.38*  1.36*  1.51*   CA  8.8  9.1  9.0   MG   --   2.0  1.7   AGAP  7  9  12   BUCR  25*  20  11*   AP   --    --   74   TP   --    --   6.6   ALB   --    --   3.0*   GLOB   --    --   3.6   AGRAT   --    --   0.8        CBC w/Diff Recent Labs      04/22/18   0325  04/20/18   1655   WBC  13.4*  9.4   RBC  4.02*  3.85*   HGB  11.9*  11.4*   HCT  35.7*  34.0*   PLT  343  285   GRANS  90*  72   LYMPH  6*  10*   EOS  0  10*        Micro  Recent Labs      04/20/18   2330   CULT  MODERATE STAPHYLOCOCCUS AUREUS*  MODERATE NORMAL RESPIRATORY ELLA     Recent Labs      04/20/18   2330   CULT  MODERATE STAPHYLOCOCCUS AUREUS*  MODERATE NORMAL RESPIRATORY ELLA        ABG Recent Labs      04/20/18 2004   PHI  7.406   PCO2I  36.4   PO2I  78*   HCO3I  22.8   FIO2I  21          ECHO 3/22/17 Left ventricle: Systolic function was normal by visual assessment.   Ejection fraction was estimated in the range of 55 % to 60 %. Poor  resolution of endocardial border. Doppler parameters were consistent with abnormal left ventricular relaxation (grade 1 diastolic dysfunction). RIGHT VENTRICLE: The size was normal. Systolic function was normal.     CT chest 3/19/2017   Results from Hospital Encounter encounter on 03/18/17   CT CHEST WO CONT   Narrative COMPARISON: Chest x-ray dated March 18, 2017    INDICATION: Blunt trauma. Right lower lobe mass. TECHNIQUE: Axial was performed through the chest without contrast.  Coronal and  sagittal reformations were generated. Dose reduction techniques used: Automated  exposure control, adjustment of the mAs and/or kVp according to patient's size,  and iterative reconstruction techniques.    ============    LUNGS and pleura: There is an ovoid peripherally calcified heterogeneous but  predominately hyperattenuating pleural-based mass along the anterior right upper  lobe which measures 2.6 x 1.8 cm transaxial by up to 4.9 cm craniocaudal. This  likely corresponds to the ovoid opacity described on the prior radiographs and  was present dating back to the earliest prior exam from 2008 although there are  progressive dystrophic calcifications. Additionally, there is a complex loculated pleural fluid collection with  dystrophic calcifications along the periphery as well as internal heterogeneous  high attenuation and scattered calcific debris. The collection measures  approximately 9.1 x 3.7 cm transaxial by approximately 12.1 cm craniocaudal.  Additional scattered calcified pleural plaque is seen extending along the  posterior right upper lobe. Associated asymmetric volume loss in the right lung. There is mild dependent  pleural thickening inferiorly on the left. There are a few streaky bands of  scarring and atelectasis throughout the right lung. AIRWAY: Unremarkable. MEDIASTINUM: Normal heart size. No pericardial effusion. Great vessels are  unremarkable.  No thoracic adenopathy. UPPER ABDOMEN: Scattered calcified granulomas throughout the liver. OTHER: No aggressive osseous lesions. Exaggerated thoracic kyphosis. Multilevel  spondylosis. The bones are osteopenic.    ============         Impression IMPRESSION:      1. Chronic dystrophic pleural-based masslike fluid collections with  heterogeneous complex internal attenuation suggesting sequela of a chronic  granulomatous process. The appearance is atypical for calcified pleural plaques  in the setting of asbestos-related pleural disease. Findings may represent a  form of a chronic empyema and correspond to the radiographic findings dating  back to at least 2008. CXR 4/20/18   Results from Hospital Encounter encounter on 04/20/18   XR CHEST PORT   Narrative EXAM: AP portable upright chest    INDICATION: Shortness of breath    COMPARISON: March 18, 2017    _______________    FINDINGS:    Heart and mediastinal contours are normal. Chronic volume loss seen in the right  hemithorax. Chronic interstitial lung changes are present at the right lung  base. No focal consolidation or effusion or pneumothorax.    _______________         Impression IMPRESSION:    No acute process           XR Chest 2 view PA[facility]LAT [facility]/24/2016  [facility]  Result Narrative   CHEST PA/LAT 2 VIEWS  COMPLETED DATE/TIME 6/24/2016 4:18 pm  REASON FOR EXAM dyspnea  COMPARISON 01/06/2013  REPORT  Frontal and lateral radiographs of the chest demonstrate chronic changes in the right hemithorax with chronic loculated pleural fluid or pleural thickening unchanged since prior study.  Left lung is clear.  Heart size is normal.  Aorta is normal in contour.  No pneumothorax. IMPRESSION  Chronic changes right hemithorax with loculated pleural fluid or pleural thickening unchanged since the prior study.        IMPRESSION:   · AE-COPD  · Chronic dystrophic pleural based mass like fluid collection with heterogenous complex internal attenuation right lung, suggesting sequelae of chronic granulomatous process or complex pleural effusion in past   · CKD stage 3  · Asbestos exposure  · Prostate cancer. · Smoker     Echo 3/22/17: LVEF 55-60%, 1 DD. RECOMMENDATIONS:   Patient resp symptoms continue to improve  Sputum cxs shows staph aureus-follow sensitivities  Ab - ceftriaxone day 4/7 and azithromycin day 4/5  C/w pulmicort neb bid and brovana neb bid; duoneb qid for now  C/w O2 via NC - wean to keep O2 sats >91%  C/w steroids, solumedrol to 40 mg q12; wean to oral prednisone 40 mg from tomorrow. Home O2 eval tomorrow-ordered  Outpatient PFTs  Recommend smoking cessation  Outpatient chest imaging follow up as needed - chronic changes dating back to Jan 2013  GI Prophylaxis: pepcid daily while on oral steroids  DVT Prophylaxis: sc heparin     Other issues management by primary team and respective consultants. Further recommendations will be based on the patient's response to recommended treatment and results of the investigation ordered.   NEIL.w patient      Crow Brewster MD  4/23/2018

## 2018-04-24 LAB
BACTERIA SPEC CULT: ABNORMAL
BACTERIA SPEC CULT: ABNORMAL
GRAM STN SPEC: ABNORMAL
SERVICE CMNT-IMP: ABNORMAL

## 2018-04-30 ENCOUNTER — HOME CARE VISIT (OUTPATIENT)
Dept: HOME HEALTH SERVICES | Facility: HOME HEALTH | Age: 73
End: 2018-04-30

## 2018-05-01 ENCOUNTER — HOME CARE VISIT (OUTPATIENT)
Dept: HOME HEALTH SERVICES | Facility: HOME HEALTH | Age: 73
End: 2018-05-01

## 2018-07-20 ENCOUNTER — APPOINTMENT (OUTPATIENT)
Dept: GENERAL RADIOLOGY | Age: 73
DRG: 191 | End: 2018-07-20
Attending: EMERGENCY MEDICINE
Payer: MEDICARE

## 2018-07-20 ENCOUNTER — HOSPITAL ENCOUNTER (INPATIENT)
Age: 73
LOS: 3 days | Discharge: HOME | DRG: 191 | End: 2018-07-23
Attending: EMERGENCY MEDICINE | Admitting: HOSPITALIST
Payer: MEDICARE

## 2018-07-20 DIAGNOSIS — J45.902 COPD WITH ASTHMA AND STATUS ASTHMATICUS (HCC): Primary | ICD-10-CM

## 2018-07-20 DIAGNOSIS — J44.9 COPD WITH ASTHMA AND STATUS ASTHMATICUS (HCC): Primary | ICD-10-CM

## 2018-07-20 PROBLEM — N18.30 CHRONIC RENAL DISEASE, STAGE 3, MODERATELY DECREASED GLOMERULAR FILTRATION RATE BETWEEN 30-59 ML/MIN/1.73 SQUARE METER (HCC): Status: ACTIVE | Noted: 2018-07-20

## 2018-07-20 LAB
ALBUMIN SERPL-MCNC: 3.1 G/DL (ref 3.4–5)
ALBUMIN/GLOB SERPL: 0.7 {RATIO} (ref 0.8–1.7)
ALP SERPL-CCNC: 97 U/L (ref 45–117)
ALT SERPL-CCNC: 20 U/L (ref 16–61)
ANION GAP SERPL CALC-SCNC: 10 MMOL/L (ref 3–18)
ARTERIAL PATENCY WRIST A: YES
AST SERPL-CCNC: 20 U/L (ref 15–37)
BASE EXCESS BLD CALC-SCNC: 1 MMOL/L
BASOPHILS # BLD: 0.1 K/UL (ref 0–0.1)
BASOPHILS NFR BLD: 1 % (ref 0–2)
BDY SITE: ABNORMAL
BILIRUB SERPL-MCNC: 0.5 MG/DL (ref 0.2–1)
BNP SERPL-MCNC: 70 PG/ML (ref 0–900)
BUN SERPL-MCNC: 22 MG/DL (ref 7–18)
BUN/CREAT SERPL: 14 (ref 12–20)
CALCIUM SERPL-MCNC: 9.4 MG/DL (ref 8.5–10.1)
CHLORIDE SERPL-SCNC: 95 MMOL/L (ref 100–108)
CK MB CFR SERPL CALC: 3.9 % (ref 0–4)
CK MB SERPL-MCNC: 4.2 NG/ML (ref 5–25)
CK SERPL-CCNC: 109 U/L (ref 39–308)
CO2 SERPL-SCNC: 29 MMOL/L (ref 21–32)
CREAT SERPL-MCNC: 1.61 MG/DL (ref 0.6–1.3)
D DIMER PPP FEU-MCNC: 0.74 UG/ML(FEU)
DIFFERENTIAL METHOD BLD: ABNORMAL
EOSINOPHIL # BLD: 0.7 K/UL (ref 0–0.4)
EOSINOPHIL NFR BLD: 7 % (ref 0–5)
ERYTHROCYTE [DISTWIDTH] IN BLOOD BY AUTOMATED COUNT: 13.3 % (ref 11.6–14.5)
EST. AVERAGE GLUCOSE BLD GHB EST-MCNC: 134 MG/DL
GAS FLOW.O2 O2 DELIVERY SYS: ABNORMAL L/MIN
GLOBULIN SER CALC-MCNC: 4.2 G/DL (ref 2–4)
GLUCOSE BLD STRIP.AUTO-MCNC: 173 MG/DL (ref 70–110)
GLUCOSE BLD STRIP.AUTO-MCNC: 216 MG/DL (ref 70–110)
GLUCOSE SERPL-MCNC: 114 MG/DL (ref 74–99)
HBA1C MFR BLD: 6.3 % (ref 4.5–5.6)
HCO3 BLD-SCNC: 25.3 MMOL/L (ref 22–26)
HCT VFR BLD AUTO: 35.4 % (ref 36–48)
HGB BLD-MCNC: 12.1 G/DL (ref 13–16)
INR PPP: 1 (ref 0.8–1.2)
LACTATE SERPL-SCNC: 1.6 MMOL/L (ref 0.4–2)
LYMPHOCYTES # BLD: 0.8 K/UL (ref 0.9–3.6)
LYMPHOCYTES NFR BLD: 8 % (ref 21–52)
MCH RBC QN AUTO: 29.4 PG (ref 24–34)
MCHC RBC AUTO-ENTMCNC: 34.2 G/DL (ref 31–37)
MCV RBC AUTO: 86.1 FL (ref 74–97)
MONOCYTES # BLD: 1 K/UL (ref 0.05–1.2)
MONOCYTES NFR BLD: 10 % (ref 3–10)
NEUTS SEG # BLD: 7.4 K/UL (ref 1.8–8)
NEUTS SEG NFR BLD: 74 % (ref 40–73)
O2/TOTAL GAS SETTING VFR VENT: 21 %
PCO2 BLD: 36.4 MMHG (ref 35–45)
PH BLD: 7.45 [PH] (ref 7.35–7.45)
PLATELET # BLD AUTO: 371 K/UL (ref 135–420)
PMV BLD AUTO: 8.7 FL (ref 9.2–11.8)
PO2 BLD: 74 MMHG (ref 80–100)
POTASSIUM SERPL-SCNC: 3.5 MMOL/L (ref 3.5–5.5)
PROT SERPL-MCNC: 7.3 G/DL (ref 6.4–8.2)
PROTHROMBIN TIME: 12.8 SEC (ref 11.5–15.2)
RBC # BLD AUTO: 4.11 M/UL (ref 4.7–5.5)
SAO2 % BLD: 95 % (ref 92–97)
SERVICE CMNT-IMP: ABNORMAL
SODIUM SERPL-SCNC: 134 MMOL/L (ref 136–145)
SPECIMEN TYPE: ABNORMAL
TOTAL RESP. RATE, ITRR: 24
TROPONIN I SERPL-MCNC: <0.02 NG/ML (ref 0–0.06)
WBC # BLD AUTO: 9.9 K/UL (ref 4.6–13.2)

## 2018-07-20 PROCEDURE — 80053 COMPREHEN METABOLIC PANEL: CPT | Performed by: EMERGENCY MEDICINE

## 2018-07-20 PROCEDURE — 87040 BLOOD CULTURE FOR BACTERIA: CPT | Performed by: EMERGENCY MEDICINE

## 2018-07-20 PROCEDURE — 74011000250 HC RX REV CODE- 250: Performed by: EMERGENCY MEDICINE

## 2018-07-20 PROCEDURE — 85025 COMPLETE CBC W/AUTO DIFF WBC: CPT | Performed by: EMERGENCY MEDICINE

## 2018-07-20 PROCEDURE — 74011250636 HC RX REV CODE- 250/636: Performed by: HOSPITALIST

## 2018-07-20 PROCEDURE — 94640 AIRWAY INHALATION TREATMENT: CPT

## 2018-07-20 PROCEDURE — 65270000029 HC RM PRIVATE

## 2018-07-20 PROCEDURE — 74011250637 HC RX REV CODE- 250/637: Performed by: HOSPITALIST

## 2018-07-20 PROCEDURE — 83036 HEMOGLOBIN GLYCOSYLATED A1C: CPT | Performed by: HOSPITALIST

## 2018-07-20 PROCEDURE — 99285 EMERGENCY DEPT VISIT HI MDM: CPT

## 2018-07-20 PROCEDURE — 74011000250 HC RX REV CODE- 250: Performed by: HOSPITALIST

## 2018-07-20 PROCEDURE — 82803 BLOOD GASES ANY COMBINATION: CPT

## 2018-07-20 PROCEDURE — 85379 FIBRIN DEGRADATION QUANT: CPT | Performed by: EMERGENCY MEDICINE

## 2018-07-20 PROCEDURE — 94760 N-INVAS EAR/PLS OXIMETRY 1: CPT

## 2018-07-20 PROCEDURE — 85610 PROTHROMBIN TIME: CPT | Performed by: EMERGENCY MEDICINE

## 2018-07-20 PROCEDURE — 82550 ASSAY OF CK (CPK): CPT | Performed by: EMERGENCY MEDICINE

## 2018-07-20 PROCEDURE — 82962 GLUCOSE BLOOD TEST: CPT

## 2018-07-20 PROCEDURE — 77030013140 HC MSK NEB VYRM -A

## 2018-07-20 PROCEDURE — 74011636637 HC RX REV CODE- 636/637: Performed by: HOSPITALIST

## 2018-07-20 PROCEDURE — 36600 WITHDRAWAL OF ARTERIAL BLOOD: CPT

## 2018-07-20 PROCEDURE — 83605 ASSAY OF LACTIC ACID: CPT | Performed by: EMERGENCY MEDICINE

## 2018-07-20 PROCEDURE — 83880 ASSAY OF NATRIURETIC PEPTIDE: CPT | Performed by: EMERGENCY MEDICINE

## 2018-07-20 PROCEDURE — 71045 X-RAY EXAM CHEST 1 VIEW: CPT

## 2018-07-20 PROCEDURE — 93005 ELECTROCARDIOGRAM TRACING: CPT

## 2018-07-20 PROCEDURE — 74011250636 HC RX REV CODE- 250/636: Performed by: EMERGENCY MEDICINE

## 2018-07-20 PROCEDURE — 74011250637 HC RX REV CODE- 250/637: Performed by: INTERNAL MEDICINE

## 2018-07-20 PROCEDURE — 96374 THER/PROPH/DIAG INJ IV PUSH: CPT

## 2018-07-20 RX ORDER — HYDROCODONE BITARTRATE AND ACETAMINOPHEN 10; 325 MG/1; MG/1
1 TABLET ORAL
Status: DISCONTINUED | OUTPATIENT
Start: 2018-07-20 | End: 2018-07-23 | Stop reason: HOSPADM

## 2018-07-20 RX ORDER — NALOXONE HYDROCHLORIDE 0.4 MG/ML
0.4 INJECTION, SOLUTION INTRAMUSCULAR; INTRAVENOUS; SUBCUTANEOUS AS NEEDED
Status: DISCONTINUED | OUTPATIENT
Start: 2018-07-20 | End: 2018-07-23 | Stop reason: HOSPADM

## 2018-07-20 RX ORDER — HEPARIN SODIUM 5000 [USP'U]/ML
5000 INJECTION, SOLUTION INTRAVENOUS; SUBCUTANEOUS EVERY 8 HOURS
Status: DISCONTINUED | OUTPATIENT
Start: 2018-07-20 | End: 2018-07-23 | Stop reason: HOSPADM

## 2018-07-20 RX ORDER — LEVOFLOXACIN 5 MG/ML
500 INJECTION, SOLUTION INTRAVENOUS EVERY 24 HOURS
Status: DISCONTINUED | OUTPATIENT
Start: 2018-07-20 | End: 2018-07-23 | Stop reason: HOSPADM

## 2018-07-20 RX ORDER — SODIUM CHLORIDE 0.9 % (FLUSH) 0.9 %
5-10 SYRINGE (ML) INJECTION EVERY 8 HOURS
Status: DISCONTINUED | OUTPATIENT
Start: 2018-07-20 | End: 2018-07-23 | Stop reason: HOSPADM

## 2018-07-20 RX ORDER — MAGNESIUM SULFATE 100 %
4 CRYSTALS MISCELLANEOUS AS NEEDED
Status: DISCONTINUED | OUTPATIENT
Start: 2018-07-20 | End: 2018-07-23 | Stop reason: HOSPADM

## 2018-07-20 RX ORDER — FUROSEMIDE 20 MG/1
20 TABLET ORAL DAILY
Status: DISCONTINUED | OUTPATIENT
Start: 2018-07-21 | End: 2018-07-23 | Stop reason: HOSPADM

## 2018-07-20 RX ORDER — SODIUM CHLORIDE 0.9 % (FLUSH) 0.9 %
5-10 SYRINGE (ML) INJECTION AS NEEDED
Status: DISCONTINUED | OUTPATIENT
Start: 2018-07-20 | End: 2018-07-23 | Stop reason: HOSPADM

## 2018-07-20 RX ORDER — DOCUSATE SODIUM 100 MG/1
100 CAPSULE, LIQUID FILLED ORAL 2 TIMES DAILY
Status: DISCONTINUED | OUTPATIENT
Start: 2018-07-20 | End: 2018-07-23 | Stop reason: HOSPADM

## 2018-07-20 RX ORDER — ARFORMOTEROL TARTRATE 15 UG/2ML
15 SOLUTION RESPIRATORY (INHALATION)
Status: DISCONTINUED | OUTPATIENT
Start: 2018-07-20 | End: 2018-07-23 | Stop reason: HOSPADM

## 2018-07-20 RX ORDER — IPRATROPIUM BROMIDE AND ALBUTEROL SULFATE 2.5; .5 MG/3ML; MG/3ML
3 SOLUTION RESPIRATORY (INHALATION)
Status: DISCONTINUED | OUTPATIENT
Start: 2018-07-20 | End: 2018-07-23 | Stop reason: HOSPADM

## 2018-07-20 RX ORDER — BUDESONIDE 0.5 MG/2ML
500 INHALANT ORAL
Status: DISCONTINUED | OUTPATIENT
Start: 2018-07-20 | End: 2018-07-23 | Stop reason: HOSPADM

## 2018-07-20 RX ORDER — PANTOPRAZOLE SODIUM 40 MG/1
40 TABLET, DELAYED RELEASE ORAL
Status: DISCONTINUED | OUTPATIENT
Start: 2018-07-21 | End: 2018-07-23 | Stop reason: HOSPADM

## 2018-07-20 RX ORDER — ALBUTEROL SULFATE 2.5 MG/.5ML
10 SOLUTION RESPIRATORY (INHALATION)
Status: COMPLETED | OUTPATIENT
Start: 2018-07-20 | End: 2018-07-20

## 2018-07-20 RX ORDER — TAMSULOSIN HYDROCHLORIDE 0.4 MG/1
0.4 CAPSULE ORAL DAILY
Status: DISCONTINUED | OUTPATIENT
Start: 2018-07-21 | End: 2018-07-23 | Stop reason: HOSPADM

## 2018-07-20 RX ORDER — ACETAMINOPHEN 325 MG/1
650 TABLET ORAL
Status: DISCONTINUED | OUTPATIENT
Start: 2018-07-20 | End: 2018-07-23 | Stop reason: HOSPADM

## 2018-07-20 RX ORDER — IPRATROPIUM BROMIDE AND ALBUTEROL SULFATE 2.5; .5 MG/3ML; MG/3ML
3 SOLUTION RESPIRATORY (INHALATION)
Status: COMPLETED | OUTPATIENT
Start: 2018-07-20 | End: 2018-07-20

## 2018-07-20 RX ORDER — ONDANSETRON 2 MG/ML
4 INJECTION INTRAMUSCULAR; INTRAVENOUS
Status: DISCONTINUED | OUTPATIENT
Start: 2018-07-20 | End: 2018-07-23 | Stop reason: HOSPADM

## 2018-07-20 RX ORDER — IPRATROPIUM BROMIDE AND ALBUTEROL SULFATE 2.5; .5 MG/3ML; MG/3ML
3 SOLUTION RESPIRATORY (INHALATION)
Status: DISCONTINUED | OUTPATIENT
Start: 2018-07-20 | End: 2018-07-20

## 2018-07-20 RX ORDER — FUROSEMIDE 20 MG/1
20 TABLET ORAL DAILY
Status: ON HOLD | COMMUNITY
End: 2019-07-09

## 2018-07-20 RX ORDER — INSULIN LISPRO 100 [IU]/ML
INJECTION, SOLUTION INTRAVENOUS; SUBCUTANEOUS
Status: DISCONTINUED | OUTPATIENT
Start: 2018-07-20 | End: 2018-07-23 | Stop reason: HOSPADM

## 2018-07-20 RX ORDER — DEXTROSE 50 % IN WATER (D50W) INTRAVENOUS SYRINGE
25-50 AS NEEDED
Status: DISCONTINUED | OUTPATIENT
Start: 2018-07-20 | End: 2018-07-23 | Stop reason: HOSPADM

## 2018-07-20 RX ADMIN — INSULIN LISPRO 2 UNITS: 100 INJECTION, SOLUTION INTRAVENOUS; SUBCUTANEOUS at 21:08

## 2018-07-20 RX ADMIN — METHYLPREDNISOLONE SODIUM SUCCINATE 60 MG: 125 INJECTION, POWDER, FOR SOLUTION INTRAMUSCULAR; INTRAVENOUS at 23:34

## 2018-07-20 RX ADMIN — METHYLPREDNISOLONE SODIUM SUCCINATE 60 MG: 125 INJECTION, POWDER, FOR SOLUTION INTRAMUSCULAR; INTRAVENOUS at 18:24

## 2018-07-20 RX ADMIN — Medication 10 ML: at 18:22

## 2018-07-20 RX ADMIN — HEPARIN SODIUM 5000 UNITS: 5000 INJECTION, SOLUTION INTRAVENOUS; SUBCUTANEOUS at 21:09

## 2018-07-20 RX ADMIN — LEVOFLOXACIN 500 MG: 5 INJECTION, SOLUTION INTRAVENOUS at 15:16

## 2018-07-20 RX ADMIN — Medication 10 ML: at 21:01

## 2018-07-20 RX ADMIN — DOCUSATE SODIUM 100 MG: 100 CAPSULE, LIQUID FILLED ORAL at 21:01

## 2018-07-20 RX ADMIN — METHYLPREDNISOLONE SODIUM SUCCINATE 125 MG: 125 INJECTION, POWDER, FOR SOLUTION INTRAMUSCULAR; INTRAVENOUS at 13:39

## 2018-07-20 RX ADMIN — ARFORMOTEROL TARTRATE 15 MCG: 15 SOLUTION RESPIRATORY (INHALATION) at 20:43

## 2018-07-20 RX ADMIN — INSULIN LISPRO 4 UNITS: 100 INJECTION, SOLUTION INTRAVENOUS; SUBCUTANEOUS at 18:23

## 2018-07-20 RX ADMIN — IPRATROPIUM BROMIDE AND ALBUTEROL SULFATE 3 ML: .5; 3 SOLUTION RESPIRATORY (INHALATION) at 12:53

## 2018-07-20 RX ADMIN — IPRATROPIUM BROMIDE AND ALBUTEROL SULFATE 3 ML: .5; 3 SOLUTION RESPIRATORY (INHALATION) at 20:43

## 2018-07-20 RX ADMIN — ALBUTEROL SULFATE 10 MG: 2.5 SOLUTION RESPIRATORY (INHALATION) at 12:52

## 2018-07-20 RX ADMIN — BUDESONIDE 500 MCG: 0.5 INHALANT RESPIRATORY (INHALATION) at 20:43

## 2018-07-20 RX ADMIN — HYDROCODONE BITARTRATE AND ACETAMINOPHEN 1 TABLET: 10; 325 TABLET ORAL at 21:01

## 2018-07-20 RX ADMIN — IPRATROPIUM BROMIDE AND ALBUTEROL SULFATE 3 ML: .5; 3 SOLUTION RESPIRATORY (INHALATION) at 18:20

## 2018-07-20 RX ADMIN — HEPARIN SODIUM 5000 UNITS: 5000 INJECTION, SOLUTION INTRAVENOUS; SUBCUTANEOUS at 15:16

## 2018-07-20 NOTE — ED NOTES
Late Entry    TRANSFER - OUT REPORT:    Verbal report given to Dee Woodard RN(name) on NILESH Napier  being transferred to Med-Surg(unit) for routine progression of care       Report consisted of patients Situation, Background, Assessment and   Recommendations(SBAR). Information from the following report(s) SBAR, Kardex, ED Summary, [facility] and Recent Results was reviewed with the receiving nurse. Lines:   Peripheral IV 07/20/18 Left Hand (Active)   Site Assessment Clean, dry, & intact 7/20/2018 12:12 PM   Phlebitis Assessment 0 7/20/2018 12:12 PM   Infiltration Assessment 0 7/20/2018 12:12 PM   Dressing Status Clean, dry, & intact 7/20/2018 12:12 PM   Hub Color/Line Status Pink 7/20/2018 12:12 PM   Action Taken Blood drawn 7/20/2018 12:12 PM        Opportunity for questions and clarification was provided.       Patient transported with:   Registered Nurse

## 2018-07-20 NOTE — H&P
History & Physical    Patient: Fidel Asif MRN: [id number]  CSN: [id number]    YOB: 1945  Age: 74 y.o. Sex: male      DOA: 7/20/2018  Primary Care Provider:  Eleanor Holloway MD      Assessment/Plan     Hospital Problems  Date Reviewed: 4/20/2018          Codes Class Noted POA    COPD with asthma and status asthmaticus [facility]) ICD-10-CM: J44.9, J45.902  ICD-9-CM: 493.21  7/20/2018 Unknown        Chronic renal disease, stage 3, moderately decreased glomerular filtration rate between 30-59 mL/min/1.73 square meter ICD-10-CM: N18.3  ICD-9-CM: 585.3  7/20/2018 Unknown        Status asthmaticus with COPD (chronic obstructive pulmonary disease) ([facility] [address] ICD-10-CM: J44.9, J45.902  ICD-9-CM: 493.21  4/20/2018 Yes        Hypertension ICD-10-CM: W75  ICD-9-CM: 401.9  Unknown Yes                Admit to tele     Status asthmatics with copd   Iv steroid, breathing tx, levaquin for empiric treatment  Will have dr. Dara Thurston as out -pt   Cxr: Grossly stable appearance of the right lung base compatible with known complex  pleural-based process/effusion with partial calcification. No significant change  or acute process. -ex smoker  -can not afford Symbicort due to $60 copay   -ssi for glucose control      HTN, accelerated  Continue home medication. ckd3 stable. Continue renal dose medication. Avoid nsaids      full code     DVT : heparin . ppi proph  CC: sob        HPI:     Fidel Asif is a 74 y.o. male who hx  COPD, HTN, PNA,  prostate cancer was [facility] to  [facility] due to worsening sob and wheezing for 2-3 weeks. It associated wheezing and dry cough. He used home inhaler for self treatment, not helping. He reported he can not afford Symbicort due to copay. He quitted smoking two months ago and will have an appointment with pulmonology in 2 weeks. Denies any slurred speech/headache/cp/n/v/blurred vission/d/c/palpitation/gait change/bleeding. Denies smoking/ any alcohol or drug use.        Visit Vitals    /71 (BP 1 Location: Left arm, BP Patient Position: At rest)    Pulse 95    Temp 98.4 °F (36.9 °C)    Resp 24    Ht 5' 8\" (1.727 m)    Wt 90.7 kg (200 lb)    SpO2 98%    BMI 30.41 kg/m2      O2 Device: Room air      Past Medical History:   Diagnosis Date    Cancer [facility])     prostate    COPD (chronic obstructive pulmonary disease) ([facility] [address]     Hypertension     Pneumonia     Radiation effect        Past Surgical History:   Procedure Laterality Date    HX HERNIA REPAIR         Family History   Problem Relation Age of Onset    Heart Disease Mother        Social History     Social History    Marital status:      Spouse name: N/A    Number of children: N/A    Years of education: N/A     Social History Main Topics    Smoking status: Former Smoker     Types: Cigarettes    Smokeless tobacco: Never Used    Alcohol use No    Drug use: None    Sexual activity: Not Asked     Other Topics Concern    None     Social History Narrative       Prior to Admission medications    Medication Sig Start Date End Date Taking? Authorizing Provider   furosemide (LASIX) 20 mg tablet Take 20 mg by mouth daily. Yes Blayne Bran MD   albuterol (PROVENTIL HFA, VENTOLIN HFA, PROAIR HFA) 90 mcg/actuation inhaler Take 2 Puffs by inhalation every four (4) hours as needed for Wheezing or Shortness of Breath. 4/23/18   Jeri Harada, PA-C   predniSONE (DELTASONE) 10 mg tablet Days 1 & 2: Take FOUR tabs. Days 3 & 4: Take THREE tabs. Days 5 & 6: Take TWO tabs. Days 7 & 8: Take ONE tab. 4/23/18   Jeri Harada, PA-C   ipratropium (ATROVENT) 0.03 % nasal spray 2 Sprays every twelve (12) hours. Blayne Bran MD   tamsulosin (FLOMAX) 0.4 mg capsule Take 0.4 mg by mouth daily. Blayne Bran MD   albuterol (PROVENTIL VENTOLIN) 2.5 mg /3 mL (0.083 %) nebulizer solution 3 mL by Nebulization route every four (4) hours as needed for Wheezing.  12/23/15   TRINA Xiong   budesonide-formoterol [facility]) 160-4.5 mcg/actuation HFA inhaler Take 2 Puffs by inhalation two (2) times a day. 12/23/15   TRINA Cedillo   chlorthalidone (HYGROTEN) 25 mg tablet Take  by mouth daily. Blayne Bran MD   Nebulizer & Compressor machine Dispense: 1 nebulizer machine w all tubing necessary 10/6/14   Tyra Clifton,        Allergies   Allergen Reactions    Aspirin Other (comments)     \"messes my stomach up\"       Review of Systems  Gen: No fever, chills, malaise, weight loss/gain. Heent: No headache, rhinorrhea, epistaxis, ear pain, hearing loss, sinus pain, neck pain/stiffness, sore throat. Heart: No chest pain, palpitations, KUMAR, pnd, or orthopnea. Resp: + cough, no hemoptysis, =wheezing and shortness of breath. GI: No nausea, vomiting, diarrhea, constipation, melena or hematochezia. : No urinary obstruction, dysuria or hematuria. Derm: No rash, new skin lesion or pruritis. Musc/skeletal: no bone or joint complains. Vasc: No edema, cyanosis or claudication. Endo: No heat/cold intolerance, no polyuria,polydipsia or polyphagia. Neuro: No unilateral weakness, numbness, tingling. No seizures. Heme: No easy bruising or bleeding. Physical Exam:     Physical Exam:  Visit Vitals    /71 (BP 1 Location: Left arm, BP Patient Position: At rest)    Pulse 95    Temp 98.4 °F (36.9 °C)    Resp 24    Ht 5' 8\" (1.727 m)    Wt 90.7 kg (200 lb)    SpO2 98%    BMI 30.41 kg/m2      O2 Device: Room air    Temp (24hrs), Av.3 °F (36.8 °C), Min:98.2 °F (36.8 °C), Max:98.4 °F (36.9 °C)             General:  Awake, cooperative, no distress. Head:  Normocephalic, without obvious abnormality, atraumatic. Eyes:  Conjunctivae/corneas clear, sclera anicteric, PERRL, EOMs intact. Nose: Nares normal. No drainage or sinus tenderness. Throat: Lips, mucosa, and tongue normal. .   Neck: Supple, symmetrical, trachea midline, no adenopathy.        Lungs:   Bilateral wheezing and increased expiration BS        Heart:  Regular rate and rhythm, S1, S2 normal, no murmur, click, rub or gallop. Abdomen: Soft, non-tender. Bowel sounds normal. No masses,  No organomegaly. Extremities: Extremities normal, atraumatic, no cyanosis or edema. Pulses: 2+ and symmetric all extremities. Skin: Skin color-pink, texture, turgor normal. No rashes or lesions. Capillary refill normal    Neurologic: CNII-XII intact. No focal motor or sensory deficit.        Labs Reviewed:    BMP:   Lab Results   Component Value Date/Time     (L) 07/20/2018 12:25 PM    K 3.5 07/20/2018 12:25 PM    CL 95 (L) 07/20/2018 12:25 PM    CO2 29 07/20/2018 12:25 PM    AGAP 10 07/20/2018 12:25 PM     (H) 07/20/2018 12:25 PM    BUN 22 (H) 07/20/2018 12:25 PM    CREA 1.61 (H) 07/20/2018 12:25 PM    GFRAA 51 (L) 07/20/2018 12:25 PM    GFRNA 42 (L) 07/20/2018 12:25 PM     CMP:   Lab Results   Component Value Date/Time     (L) 07/20/2018 12:25 PM    K 3.5 07/20/2018 12:25 PM    CL 95 (L) 07/20/2018 12:25 PM    CO2 29 07/20/2018 12:25 PM    AGAP 10 07/20/2018 12:25 PM     (H) 07/20/2018 12:25 PM    BUN 22 (H) 07/20/2018 12:25 PM    CREA 1.61 (H) 07/20/2018 12:25 PM    GFRAA 51 (L) 07/20/2018 12:25 PM    GFRNA 42 (L) 07/20/2018 12:25 PM    CA 9.4 07/20/2018 12:25 PM    ALB 3.1 (L) 07/20/2018 12:25 PM    TP 7.3 07/20/2018 12:25 PM    GLOB 4.2 (H) 07/20/2018 12:25 PM    AGRAT 0.7 (L) 07/20/2018 12:25 PM    SGOT 20 07/20/2018 12:25 PM    ALT 20 07/20/2018 12:25 PM     CBC:   Lab Results   Component Value Date/Time    WBC 9.9 07/20/2018 12:25 PM    HGB 12.1 (L) 07/20/2018 12:25 PM    HCT 35.4 (L) 07/20/2018 12:25 PM     07/20/2018 12:25 PM     All Cardiac Markers in the last 24 hours:   Lab Results   Component Value Date/Time     07/20/2018 12:25 PM    CKMB 4.2 (H) 07/20/2018 12:25 PM    CKND1 3.9 07/20/2018 12:25 PM    TROIQ <0.02 07/20/2018 12:25 PM     Recent Glucose Results:   Lab Results   Component Value Date/Time     (H) 07/20/2018 12:25 PM     ABG:   Lab Results   Component Value Date/Time    PHI 7.450 07/20/2018 01:14 PM    PCO2I 36.4 07/20/2018 01:14 PM    PO2I 74 (L) 07/20/2018 01:14 PM    HCO3I 25.3 07/20/2018 01:14 PM    FIO2I 21 07/20/2018 01:14 PM     COAGS:   Lab Results   Component Value Date/Time    PTP 12.8 07/20/2018 12:25 PM    INR 1.0 07/20/2018 12:25 PM     Liver Panel:   Lab Results   Component Value Date/Time    ALB 3.1 (L) 07/20/2018 12:25 PM    TP 7.3 07/20/2018 12:25 PM    GLOB 4.2 (H) 07/20/2018 12:25 PM    AGRAT 0.7 (L) 07/20/2018 12:25 PM    SGOT 20 07/20/2018 12:25 PM    ALT 20 07/20/2018 12:25 PM    AP 97 07/20/2018 12:25 PM     Pancreatic Markers: No results found for: AMYLPOCT, AML, LIPPOCT, LPSE    Procedures/imaging: see electronic medical records for all procedures/Xrays and details which were not copied into this note but were reviewed prior to creation of Kaylyn Howell MD, Internal Medicine     CC: Haley Boyd MD

## 2018-07-20 NOTE — IP AVS SNAPSHOT
[address] [address] 
[phone] Patient: Rosa Chawla MRN: [id number] SVK:7/29/[birth year]98 About your hospitalization You were admitted on:  July 20, 2018 You last received care in the:  [address] [place] [address] You were discharged on:  July 23, 2018 Why you were hospitalized Your primary diagnosis was:  Copd With Asthma And Status Asthmaticus (Hcc) Your diagnoses also included:  Hypertension, Status Asthmaticus With Copd (Chronic Obstructive Pulmonary Disease) (Hcc), Chronic Renal Disease, Stage 3, Moderately Decreased Glomerular Filtration Rate Between 30-59 Ml/Min/1.73 Square Meter Follow-up Information Follow up With Details Comments Contact Info Harpal Pulido MD In 3 days Patient prefers to schedule his own follow up appointment. [address] [facility] 100 [address] [facility] 
[phone] Jeffry Hernandez MD On 8/2/2018 Follow up appointment @ 10:00am 1 [facility] 114 [address] [facility] 
[phone] Discharge Orders None A check lala indicates which time of day the medication should be taken. My Medications START taking these medications Instructions Each Dose to Equal  
 Morning Noon Evening Bedtime  
 levoFLOXacin 500 mg tablet Commonly known as:  Alexandria Oar Your last dose was: Your next dose is: Take 1 Tab by mouth daily for 2 days. 500 mg  
    
   
   
   
  
 predniSONE 5 mg dose pack Commonly known as:  STERAPRED Replaces:  predniSONE 10 mg tablet Your last dose was: Your next dose is:    
   
   
 See administration instruction per 5mg dose pack CONTINUE taking these medications Instructions Each Dose to Equal  
 Morning Noon Evening Bedtime * albuterol 2.5 mg /3 mL (0.083 %) nebulizer solution Commonly known as:  PROVENTIL VENTOLIN  
   
 Your last dose was: Your next dose is:    
   
   
 3 mL by Nebulization route every four (4) hours as needed for Wheezing. 2.5 mg  
    
   
   
   
  
 * albuterol 90 mcg/actuation inhaler Commonly known as:  PROVENTIL HFA, VENTOLIN HFA, PROAIR HFA Your last dose was: Your next dose is: Take 2 Puffs by inhalation every four (4) hours as needed for Wheezing or Shortness of Breath. 2 Puff  
    
   
   
   
  
 budesonide-formoterol 160-4.5 mcg/actuation Hfaa Commonly known as:  SYMBICORT Your last dose was: Your next dose is: Take 2 Puffs by inhalation two (2) times a day. 2 Puff  
    
   
   
   
  
 chlorthalidone 25 mg tablet Commonly known as:  Chales Chamber Your last dose was: Your next dose is: Take  by mouth daily. furosemide 20 mg tablet Commonly known as:  LASIX Your last dose was: Your next dose is: Take 20 mg by mouth daily. 20 mg  
    
   
   
   
  
 ipratropium 0.03 % nasal spray Commonly known as:  ATROVENT Your last dose was: Your next dose is: 2 Sprays every twelve (12) hours. 2 Spray Nebulizer & Compressor machine Your last dose was: Your next dose is:    
   
   
 Dispense: 1 nebulizer machine w all tubing necessary  
     
   
   
   
  
 tamsulosin 0.4 mg capsule Commonly known as:  FLOMAX Your last dose was: Your next dose is: Take 0.4 mg by mouth daily. 0.4 mg  
    
   
   
   
  
 * Notice: This list has 2 medication(s) that are the same as other medications prescribed for you. Read the directions carefully, and ask your doctor or other care provider to review them with you. STOP taking these medications   
 predniSONE 10 mg tablet Commonly known as:  Alvina Butler Replaced by:  predniSONE 5 mg dose pack Where to Get Your Medications These medications were sent to [address], [place] - [address] [place] [address] [place] [address] & [address] [facility] [address] , [address] [address] Phone:  [phone] levoFLOXacin 500 mg tablet  
 predniSONE 5 mg dose pack Discharge Instructions DISCHARGE SUMMARY from Nurse PATIENT INSTRUCTIONS: 
 
 
F-face looks uneven A-arms unable to move or move unevenly S-speech slurred or non-existent T-time-call 911 as soon as signs and symptoms begin-DO NOT go Back to bed or wait to see if you get better-TIME IS BRAIN. Warning Signs of HEART ATTACK Call 911 if you have these symptoms: 
? Chest discomfort. Most heart attacks involve discomfort in the center of the chest that lasts more than a few minutes, or that goes away and comes back. It can feel like uncomfortable pressure, squeezing, fullness, or pain. ? Discomfort in other areas of the upper body. Symptoms can include pain or discomfort in one or both arms, the back, neck, jaw, or stomach. ? Shortness of breath with or without chest discomfort. ? Other signs may include breaking out in a cold sweat, nausea, or lightheadedness. Don't wait more than five minutes to call [address]! Fast action can save your life. Calling 911 is almost always the fastest way to get lifesaving treatment. Emergency Medical Services staff can begin treatment when they arrive  up to an hour sooner than if someone gets to the hospital by car. The discharge information has been reviewed with the patient. The patient verbalized understanding.  
Discharge medications reviewed with the patient and appropriate educational materials and side effects teaching were provided. Ken abarca removed and shredded 
 
___________________________________________________________________________________________________________________________________ MyChart Announcement We are excited to announce that we are making your provider's discharge notes available to you in [employer]. You will see these notes when they are completed and signed by the physician that discharged you from your recent hospital stay. If you have any questions or concerns about any information you see in [employer], please call the Health Information Department where you were seen or reach out to your Primary Care Provider for more information about your plan of care. Introducing [facility]! [employer] introduces [employer] patient portal. Now you can access parts of your medical record, email your doctor's office, and request medication refills online. 1. In your internet browser, go to [email or web address] 2. Click on the First Time User? Click Here link in the Sign In box. You will see the New Member Sign Up page. 3. Enter your [employer] Access Code exactly as it appears below. You will not need to use this code after youve completed the sign-up process. If you do not sign up before the expiration date, you must request a new code. · [employer] Access Code: [email or web address] Expires: 10/18/2018 12:18 PM 
 
4. Enter the last four digits of your Social Security Number (xxxx) and Date of Birth (mm/dd/yyyy) as indicated and click Submit. You will be taken to the next sign-up page. 5. Create a [employer] ID. This will be your [employer] login ID and cannot be changed, so think of one that is secure and easy to remember. 6. Create a [employer] password. You can change your password at any time. 7. Enter your Password Reset Question and Answer. This can be used at a later time if you forget your password. 8. Enter your e-mail address. You will receive e-mail notification when new information is available in [address] 9. Click Sign Up. You can now view and download portions of your medical record. 10. Click the Download Summary menu link to download a portable copy of your medical information. If you have questions, please visit the Frequently Asked Questions section of the [employer] website. Remember, [employer] is NOT to be used for urgent needs. For medical emergencies, dial 911. Now available from your iPhone and Android! Introducing Gary Huffman As a [employer] patient, I wanted to make you aware of our electronic visit tool called Gary Huffman. [facility] 24/7 allows you to connect within minutes with a medical provider 24 hours a day, seven days a week via a mobile device or tablet or logging into a secure website from your computer. You can access Gary Huffman from anywhere in the [place]. A virtual visit might be right for you when you have a simple condition and feel like you just dont want to get out of bed, or cant get away from work for an appointment, when your regular [employer] provider is not available (evenings, weekends or holidays), or when youre out of town and need minor care. Electronic visits cost only $49 and if the [employer] 24/7 provider determines a prescription is needed to treat your condition, one can be electronically transmitted to a nearby pharmacy*. Please take a moment to enroll today if you have not already done so. The enrollment process is free and takes just a few minutes. To enroll, please download the [employer] 24/Keepio breanne to your tablet or phone, or visit [email or web address] to enroll on your computer.    
And, as an [address] [place] [address] patient with a [employer] account, the results of your visits will be scanned into your electronic medical record and your primary care provider will be able to view the scanned results. We urge you to continue to see your regular [facility] provider for your ongoing medical care. And while your primary care provider may not be the one available when you seek a [employer] virtual visit, the peace of mind you get from getting a real diagnosis real time can be priceless. For more information on [employer], view our Frequently Asked Questions (FAQs) at [email or web address]. Sincerely, 
 
Mila Hayes MD 
Chief Medical Officer [facility] Anna Florez *:  certain medications cannot be prescribed via [employer] Unresulted Labs-Please follow up with your PCP about these lab tests Order Current Status CULTURE, BLOOD Preliminary result CULTURE, BLOOD Preliminary result EKG, 12 LEAD, INITIAL Preliminary result Providers Seen During Your Hospitalization Provider Specialty Primary office phone Agustin Nur MD Emergency Medicine [phone] Guru Molina MD Internal Medicine [phone] Your Primary Care Physician (PCP) Primary Care Physician Office Phone Office Fax Muna Cyndi [phone] You are allergic to the following Allergen Reactions Aspirin Other (comments) \"messes my stomach up\" Recent Documentation Height Weight BMI Smoking Status 1.727 m 91.7 kg 30.73 kg/m2 Former Smoker Emergency Contacts Name Discharge Info Relation Home Work Mobile VivianGloria anaya DISCHARGE CAREGIVER [3] Spouse [3] [phone] Patient Belongings The following personal items are in your possession at time of discharge: 
  Dental Appliances: None  Visual Aid: Glasses, At bedside          Jewelry: Ring, With patient  Clothing: At bedside, Shirt, Shorts, Undergarments, Slippers, With patient    Other Valuables: At bedside, Josse Trevino, With patient Please provide this summary of care documentation to your next provider. Signatures-by signing, you are acknowledging that this After Visit Summary has been reviewed with you and you have received a copy. Patient Signature:  ____________________________________________________________ Date:  ____________________________________________________________  
  
[facility] Provider Signature:  ____________________________________________________________ Date:  ____________________________________________________________

## 2018-07-20 NOTE — ED TRIAGE NOTES
Patient arrives via EMS from home residence with c/o SOB x2weeks. EMS reports patient admin x4 albuterol tx/daily. EMS admin x1 alb tx en route.  EMS obtained FSBS: 118 mg/dL  O2 sats upon arrival % with RR 28-32 and audible wheezing

## 2018-07-20 NOTE — PROGRESS NOTES
1635-Arrived on unit. TRANSFER - IN REPORT:    Verbal report received from RADHIKA Law RN(name) on Chelle Adler  being received from ER(unit) for routine progression of care      Report consisted of patients Situation, Background, Assessment and   Recommendations(SBAR). Information from the following report(s) SBAR, Kardex and MAR      was reviewed with the receiving nurse. Opportunity for questions and clarification was provided. Assessment completed upon patients arrival to unit and care assumed. Primary Nurse Danelle Banegas and Gail Trujillo RN performed a dual skin assessment on this patient No impairment noted  Nikos score is 21. Shift summary no change in pt status. Bedside and Verbal shift change report given to Bobby Sebastian RN (oncoming nurse) by Vandana Mcnamara RN (offgoing nurse). Report included the following information SBAR, Kardex and MAR.

## 2018-07-20 NOTE — ED NOTES
Attempted to give SBAR report. Receiving RN, Charge RN, Ruby Bellamy, unavailable at this time.  Will call back for report

## 2018-07-21 LAB
ANION GAP SERPL CALC-SCNC: 14 MMOL/L (ref 3–18)
BUN SERPL-MCNC: 26 MG/DL (ref 7–18)
BUN/CREAT SERPL: 15 (ref 12–20)
CALCIUM SERPL-MCNC: 9.5 MG/DL (ref 8.5–10.1)
CHLORIDE SERPL-SCNC: 94 MMOL/L (ref 100–108)
CO2 SERPL-SCNC: 25 MMOL/L (ref 21–32)
CREAT SERPL-MCNC: 1.69 MG/DL (ref 0.6–1.3)
ERYTHROCYTE [DISTWIDTH] IN BLOOD BY AUTOMATED COUNT: 13.3 % (ref 11.6–14.5)
GLUCOSE BLD STRIP.AUTO-MCNC: 143 MG/DL (ref 70–110)
GLUCOSE BLD STRIP.AUTO-MCNC: 144 MG/DL (ref 70–110)
GLUCOSE BLD STRIP.AUTO-MCNC: 162 MG/DL (ref 70–110)
GLUCOSE BLD STRIP.AUTO-MCNC: 173 MG/DL (ref 70–110)
GLUCOSE SERPL-MCNC: 156 MG/DL (ref 74–99)
HCT VFR BLD AUTO: 32.7 % (ref 36–48)
HGB BLD-MCNC: 11.2 G/DL (ref 13–16)
MCH RBC QN AUTO: 29.5 PG (ref 24–34)
MCHC RBC AUTO-ENTMCNC: 34.3 G/DL (ref 31–37)
MCV RBC AUTO: 86.1 FL (ref 74–97)
PLATELET # BLD AUTO: 369 K/UL (ref 135–420)
PMV BLD AUTO: 8.8 FL (ref 9.2–11.8)
POTASSIUM SERPL-SCNC: 3.5 MMOL/L (ref 3.5–5.5)
RBC # BLD AUTO: 3.8 M/UL (ref 4.7–5.5)
SODIUM SERPL-SCNC: 133 MMOL/L (ref 136–145)
WBC # BLD AUTO: 13 K/UL (ref 4.6–13.2)

## 2018-07-21 PROCEDURE — 74011250637 HC RX REV CODE- 250/637: Performed by: INTERNAL MEDICINE

## 2018-07-21 PROCEDURE — 74011250636 HC RX REV CODE- 250/636: Performed by: HOSPITALIST

## 2018-07-21 PROCEDURE — 74011250637 HC RX REV CODE- 250/637: Performed by: HOSPITALIST

## 2018-07-21 PROCEDURE — 80048 BASIC METABOLIC PNL TOTAL CA: CPT | Performed by: HOSPITALIST

## 2018-07-21 PROCEDURE — 82962 GLUCOSE BLOOD TEST: CPT

## 2018-07-21 PROCEDURE — 77010033678 HC OXYGEN DAILY

## 2018-07-21 PROCEDURE — 74011250636 HC RX REV CODE- 250/636: Performed by: INTERNAL MEDICINE

## 2018-07-21 PROCEDURE — 94760 N-INVAS EAR/PLS OXIMETRY 1: CPT

## 2018-07-21 PROCEDURE — 74011636637 HC RX REV CODE- 636/637: Performed by: HOSPITALIST

## 2018-07-21 PROCEDURE — 65270000029 HC RM PRIVATE

## 2018-07-21 PROCEDURE — 36415 COLL VENOUS BLD VENIPUNCTURE: CPT | Performed by: HOSPITALIST

## 2018-07-21 PROCEDURE — 74011000250 HC RX REV CODE- 250: Performed by: HOSPITALIST

## 2018-07-21 PROCEDURE — 94640 AIRWAY INHALATION TREATMENT: CPT

## 2018-07-21 PROCEDURE — 85027 COMPLETE CBC AUTOMATED: CPT | Performed by: HOSPITALIST

## 2018-07-21 RX ORDER — GUAIFENESIN 600 MG/1
1200 TABLET, EXTENDED RELEASE ORAL EVERY 12 HOURS
Status: DISCONTINUED | OUTPATIENT
Start: 2018-07-21 | End: 2018-07-23 | Stop reason: HOSPADM

## 2018-07-21 RX ORDER — HYDROCODONE POLISTIREX AND CHLORPHENIRAMINE POLISTIREX 10; 8 MG/5ML; MG/5ML
5 SUSPENSION, EXTENDED RELEASE ORAL
Status: DISCONTINUED | OUTPATIENT
Start: 2018-07-21 | End: 2018-07-23 | Stop reason: HOSPADM

## 2018-07-21 RX ADMIN — LEVOFLOXACIN 500 MG: 5 INJECTION, SOLUTION INTRAVENOUS at 14:55

## 2018-07-21 RX ADMIN — HEPARIN SODIUM 5000 UNITS: 5000 INJECTION, SOLUTION INTRAVENOUS; SUBCUTANEOUS at 14:55

## 2018-07-21 RX ADMIN — TAMSULOSIN HYDROCHLORIDE 0.4 MG: 0.4 CAPSULE ORAL at 08:25

## 2018-07-21 RX ADMIN — BUDESONIDE 500 MCG: 0.5 INHALANT RESPIRATORY (INHALATION) at 20:18

## 2018-07-21 RX ADMIN — HEPARIN SODIUM 5000 UNITS: 5000 INJECTION, SOLUTION INTRAVENOUS; SUBCUTANEOUS at 23:38

## 2018-07-21 RX ADMIN — METHYLPREDNISOLONE SODIUM SUCCINATE 60 MG: 125 INJECTION, POWDER, FOR SOLUTION INTRAMUSCULAR; INTRAVENOUS at 05:34

## 2018-07-21 RX ADMIN — IPRATROPIUM BROMIDE AND ALBUTEROL SULFATE 3 ML: .5; 3 SOLUTION RESPIRATORY (INHALATION) at 07:05

## 2018-07-21 RX ADMIN — BUDESONIDE 500 MCG: 0.5 INHALANT RESPIRATORY (INHALATION) at 07:05

## 2018-07-21 RX ADMIN — IPRATROPIUM BROMIDE AND ALBUTEROL SULFATE 3 ML: .5; 3 SOLUTION RESPIRATORY (INHALATION) at 04:47

## 2018-07-21 RX ADMIN — IPRATROPIUM BROMIDE AND ALBUTEROL SULFATE 3 ML: .5; 3 SOLUTION RESPIRATORY (INHALATION) at 11:08

## 2018-07-21 RX ADMIN — IPRATROPIUM BROMIDE AND ALBUTEROL SULFATE 3 ML: .5; 3 SOLUTION RESPIRATORY (INHALATION) at 00:59

## 2018-07-21 RX ADMIN — FUROSEMIDE 20 MG: 20 TABLET ORAL at 08:25

## 2018-07-21 RX ADMIN — HYDROCODONE POLISTIREX AND CHLORPHENIRAMINE POLISTIREX 5 ML: 10; 8 SUSPENSION, EXTENDED RELEASE ORAL at 21:02

## 2018-07-21 RX ADMIN — GUAIFENESIN 1200 MG: 600 TABLET, EXTENDED RELEASE ORAL at 13:03

## 2018-07-21 RX ADMIN — HYDROCODONE BITARTRATE AND ACETAMINOPHEN 1 TABLET: 10; 325 TABLET ORAL at 23:42

## 2018-07-21 RX ADMIN — Medication 10 ML: at 23:37

## 2018-07-21 RX ADMIN — Medication 10 ML: at 05:35

## 2018-07-21 RX ADMIN — PANTOPRAZOLE SODIUM 40 MG: 40 TABLET, DELAYED RELEASE ORAL at 08:25

## 2018-07-21 RX ADMIN — HYDROCODONE BITARTRATE AND ACETAMINOPHEN 1 TABLET: 10; 325 TABLET ORAL at 05:34

## 2018-07-21 RX ADMIN — IPRATROPIUM BROMIDE AND ALBUTEROL SULFATE 3 ML: .5; 3 SOLUTION RESPIRATORY (INHALATION) at 23:41

## 2018-07-21 RX ADMIN — INSULIN LISPRO 2 UNITS: 100 INJECTION, SOLUTION INTRAVENOUS; SUBCUTANEOUS at 16:43

## 2018-07-21 RX ADMIN — HEPARIN SODIUM 5000 UNITS: 5000 INJECTION, SOLUTION INTRAVENOUS; SUBCUTANEOUS at 05:34

## 2018-07-21 RX ADMIN — ARFORMOTEROL TARTRATE 15 MCG: 15 SOLUTION RESPIRATORY (INHALATION) at 07:05

## 2018-07-21 RX ADMIN — ARFORMOTEROL TARTRATE 15 MCG: 15 SOLUTION RESPIRATORY (INHALATION) at 20:18

## 2018-07-21 RX ADMIN — DOCUSATE SODIUM 100 MG: 100 CAPSULE, LIQUID FILLED ORAL at 21:02

## 2018-07-21 RX ADMIN — IPRATROPIUM BROMIDE AND ALBUTEROL SULFATE 3 ML: .5; 3 SOLUTION RESPIRATORY (INHALATION) at 20:18

## 2018-07-21 RX ADMIN — IPRATROPIUM BROMIDE AND ALBUTEROL SULFATE 3 ML: .5; 3 SOLUTION RESPIRATORY (INHALATION) at 15:03

## 2018-07-21 RX ADMIN — METHYLPREDNISOLONE SODIUM SUCCINATE 40 MG: 125 INJECTION, POWDER, FOR SOLUTION INTRAMUSCULAR; INTRAVENOUS at 23:37

## 2018-07-21 RX ADMIN — METHYLPREDNISOLONE SODIUM SUCCINATE 40 MG: 125 INJECTION, POWDER, FOR SOLUTION INTRAMUSCULAR; INTRAVENOUS at 13:04

## 2018-07-21 RX ADMIN — Medication 10 ML: at 13:06

## 2018-07-21 RX ADMIN — DOCUSATE SODIUM 100 MG: 100 CAPSULE, LIQUID FILLED ORAL at 08:25

## 2018-07-21 RX ADMIN — INSULIN LISPRO 2 UNITS: 100 INJECTION, SOLUTION INTRAVENOUS; SUBCUTANEOUS at 08:26

## 2018-07-21 RX ADMIN — GUAIFENESIN 1200 MG: 600 TABLET, EXTENDED RELEASE ORAL at 21:02

## 2018-07-21 NOTE — PROGRESS NOTES
Hospitalist Progress Note    Patient: Alyse Davis MRN: [id number]  CSN: [id number]    YOB: 1945  Age: 74 y.o. Sex: male    DOA: 7/20/2018 LOS:  LOS: 1 day          Chief Complaint:  COPD exacerbation          Assessment/Plan     Disposition :  Patient Active Problem List   Diagnosis Code    COPD (chronic obstructive pulmonary disease) ([address] J44.9    URIEL (acute kidney injury) ([address] N17.9    Hypertension I10    Tobacco abuse Z72.0    Status asthmaticus with COPD (chronic obstructive pulmonary disease) ([facility]) J44.9, J45.902    PVC's (premature ventricular contractions) I49.3    COPD (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 ([facility]) J44.9    COPD with asthma and status asthmaticus ([facility]) J44.9, J45.902    Chronic renal disease, stage 3, moderately decreased glomerular filtration rate between 30-59 mL/min/1.73 square meter N18.3     Status asthmatics with copd   Wean IV steroids, continue breathing tx, levaquin for empiric treatment  Will see Dr. Jame Gonzalez as out -pt   Cxr: Grossly stable appearance of the right lung base compatible with known complex  pleural-based process/effusion with partial calcification. No significant change  or acute process. -start high dose mucinex for cough during the day with Tussionex at night to facilitate rest.  -ssi for glucose control      HTN, accelerated  Continue home medication.     ckd3 stable. Continue renal dose medication. Avoid nsaids       full code      DVT : heparin . ppi proph    Subjective:    \"I'm having the hardest time sleeping because of this cough\". No events over night.   Patient is otherwise doing well this AM.         Review of systems:    Constitutional: denies fevers, chills, myalgias  Respiratory: denies SOB, +cough  Cardiovascular: denies chest pain, palpitations  Gastrointestinal: denies nausea, vomiting, diarrhea      Vital signs/Intake and Output:  Visit Vitals    /68 (BP 1 Location: Left arm, BP Patient Position: At rest)    Pulse 83    Temp 97.9 °F (36.6 °C)    Resp 22    Ht 5' 8\" (1.727 m)    Wt 98.7 kg (217 lb 9.5 oz)    SpO2 97%    BMI 33.09 kg/m2     Current Shift:     Last three shifts:  07/19 1901 - 07/21 0700  In: 100 [I.V.:100]  Out: 425 [Urine:425]    Exam:    General: Well developed, alert, NAD, OX3  Head/Neck: NCAT, supple, No masses, No lymphadenopathy  CVS:Regular rate and rhythm, no M/R/G, S1/S2 heard, no thrill  Lungs:Clear to auscultation bilaterally, inspiratory and expirator8 wheezes, rhonchi, or rales  Abdomen: Soft, Nontender, No distention, Normal Bowel sounds, No hepatomegaly  Extremities: No C/C/E, pulses palpable 2+  Skin:normal texture and turgor, no rashes, no lesions  Neuro:grossly normal , follows commands  Psych:appropriate                Labs: Results:       Chemistry Recent Labs      07/21/18   0145  07/20/18   1225   GLU  156*  114*   NA  133*  134*   K  3.5  3.5   CL  94*  95*   CO2  25  29   BUN  26*  22*   CREA  1.69*  1.61*   CA  9.5  9.4   AGAP  14  10   BUCR  15  14   AP   --   97   TP   --   7.3   ALB   --   3.1*   GLOB   --   4.2*   AGRAT   --   0.7*      CBC w/Diff Recent Labs      07/21/18   0145  07/20/18   1225   WBC  13.0  9.9   RBC  3.80*  4.11*   HGB  11.2*  12.1*   HCT  32.7*  35.4*   PLT  369  371   GRANS   --   74*   LYMPH   --   8*   EOS   --   7*      Cardiac Enzymes Recent Labs      07/20/18   1225   CPK  109   CKND1  3.9      Coagulation Recent Labs      07/20/18   1225   PTP  12.8   INR  1.0       Lipid Panel No results found for: CHOL, CHOLPOCT, CHOLX, CHLST, CHOLV, 002226, HDL, LDL, LDLC, DLDLP, 295106, VLDLC, VLDL, TGLX, TRIGL, TRIGP, TGLPOCT, CHHD, CHHDX   BNP No results for input(s): BNPP in the last 72 hours.    Liver Enzymes Recent Labs      07/20/18   1225   TP  7.3   ALB  3.1*   AP  97   SGOT  20      Thyroid Studies No results found for: T4, T3U, TSH, TSHEXT     Procedures/imaging: see electronic medical records for all procedures/Xrays and details which were not copied into this note but were reviewed prior to creation of Sergio Negrete MD

## 2018-07-21 NOTE — ROUTINE PROCESS
Bedside shift change report given to MARLON Cage RN (oncoming nurse) by Phil Watkins RN (offgoing nurse). Report included the following information SBAR, Kardex, Procedure Summary, Intake/Output, MAR, Recent Results and Med Rec Status.

## 2018-07-21 NOTE — ROUTINE PROCESS
Bedside and Verbal shift change report given to Denver Brazier, RN (oncoming nurse) by Salvador Nicholas RN (offgoing nurse). Report included the following information SBAR, Kardex, ED Summary, Procedure Summary, Intake/Output, MAR, Recent Results and Med Rec Status.

## 2018-07-21 NOTE — PROGRESS NOTES
2025 - Patient complaining of 9/10 RUE pain r/t arthritis. Patient states that he takes Tylenol at home usually for pain but that it does not work very well. 2035 - Hospitalist paged for possible additional orders. 2054 - Norco recommended for patients pain. Orders placed by physician.

## 2018-07-22 LAB
ANION GAP SERPL CALC-SCNC: 8 MMOL/L (ref 3–18)
BUN SERPL-MCNC: 36 MG/DL (ref 7–18)
BUN/CREAT SERPL: 24 (ref 12–20)
CALCIUM SERPL-MCNC: 9 MG/DL (ref 8.5–10.1)
CHLORIDE SERPL-SCNC: 96 MMOL/L (ref 100–108)
CO2 SERPL-SCNC: 29 MMOL/L (ref 21–32)
CREAT SERPL-MCNC: 1.51 MG/DL (ref 0.6–1.3)
ERYTHROCYTE [DISTWIDTH] IN BLOOD BY AUTOMATED COUNT: 13.2 % (ref 11.6–14.5)
GLUCOSE BLD STRIP.AUTO-MCNC: 114 MG/DL (ref 70–110)
GLUCOSE BLD STRIP.AUTO-MCNC: 128 MG/DL (ref 70–110)
GLUCOSE BLD STRIP.AUTO-MCNC: 147 MG/DL (ref 70–110)
GLUCOSE BLD STRIP.AUTO-MCNC: 149 MG/DL (ref 70–110)
GLUCOSE SERPL-MCNC: 147 MG/DL (ref 74–99)
HCT VFR BLD AUTO: 31.6 % (ref 36–48)
HGB BLD-MCNC: 10.7 G/DL (ref 13–16)
MCH RBC QN AUTO: 29.3 PG (ref 24–34)
MCHC RBC AUTO-ENTMCNC: 33.9 G/DL (ref 31–37)
MCV RBC AUTO: 86.6 FL (ref 74–97)
PLATELET # BLD AUTO: 314 K/UL (ref 135–420)
PMV BLD AUTO: 8.7 FL (ref 9.2–11.8)
POTASSIUM SERPL-SCNC: 3.7 MMOL/L (ref 3.5–5.5)
RBC # BLD AUTO: 3.65 M/UL (ref 4.7–5.5)
SODIUM SERPL-SCNC: 133 MMOL/L (ref 136–145)
WBC # BLD AUTO: 12.5 K/UL (ref 4.6–13.2)

## 2018-07-22 PROCEDURE — 82962 GLUCOSE BLOOD TEST: CPT

## 2018-07-22 PROCEDURE — 74011250637 HC RX REV CODE- 250/637: Performed by: INTERNAL MEDICINE

## 2018-07-22 PROCEDURE — 74011000250 HC RX REV CODE- 250: Performed by: HOSPITALIST

## 2018-07-22 PROCEDURE — 74011250636 HC RX REV CODE- 250/636: Performed by: INTERNAL MEDICINE

## 2018-07-22 PROCEDURE — 77030027138 HC INCENT SPIROMETER -A

## 2018-07-22 PROCEDURE — 74011250636 HC RX REV CODE- 250/636: Performed by: HOSPITALIST

## 2018-07-22 PROCEDURE — 94760 N-INVAS EAR/PLS OXIMETRY 1: CPT

## 2018-07-22 PROCEDURE — 65270000029 HC RM PRIVATE

## 2018-07-22 PROCEDURE — 74011250637 HC RX REV CODE- 250/637: Performed by: HOSPITALIST

## 2018-07-22 PROCEDURE — 77010033678 HC OXYGEN DAILY

## 2018-07-22 PROCEDURE — 36415 COLL VENOUS BLD VENIPUNCTURE: CPT | Performed by: HOSPITALIST

## 2018-07-22 PROCEDURE — 94640 AIRWAY INHALATION TREATMENT: CPT

## 2018-07-22 PROCEDURE — 85027 COMPLETE CBC AUTOMATED: CPT | Performed by: HOSPITALIST

## 2018-07-22 PROCEDURE — 80048 BASIC METABOLIC PNL TOTAL CA: CPT | Performed by: HOSPITALIST

## 2018-07-22 RX ADMIN — LEVOFLOXACIN 500 MG: 5 INJECTION, SOLUTION INTRAVENOUS at 14:25

## 2018-07-22 RX ADMIN — IPRATROPIUM BROMIDE AND ALBUTEROL SULFATE 3 ML: .5; 3 SOLUTION RESPIRATORY (INHALATION) at 15:34

## 2018-07-22 RX ADMIN — METHYLPREDNISOLONE SODIUM SUCCINATE 40 MG: 40 INJECTION, POWDER, FOR SOLUTION INTRAMUSCULAR; INTRAVENOUS at 22:10

## 2018-07-22 RX ADMIN — PANTOPRAZOLE SODIUM 40 MG: 40 TABLET, DELAYED RELEASE ORAL at 06:44

## 2018-07-22 RX ADMIN — HEPARIN SODIUM 5000 UNITS: 5000 INJECTION, SOLUTION INTRAVENOUS; SUBCUTANEOUS at 06:43

## 2018-07-22 RX ADMIN — DOCUSATE SODIUM 100 MG: 100 CAPSULE, LIQUID FILLED ORAL at 20:15

## 2018-07-22 RX ADMIN — IPRATROPIUM BROMIDE AND ALBUTEROL SULFATE 3 ML: .5; 3 SOLUTION RESPIRATORY (INHALATION) at 20:16

## 2018-07-22 RX ADMIN — ARFORMOTEROL TARTRATE 15 MCG: 15 SOLUTION RESPIRATORY (INHALATION) at 07:10

## 2018-07-22 RX ADMIN — GUAIFENESIN 1200 MG: 600 TABLET, EXTENDED RELEASE ORAL at 20:15

## 2018-07-22 RX ADMIN — DOCUSATE SODIUM 100 MG: 100 CAPSULE, LIQUID FILLED ORAL at 09:53

## 2018-07-22 RX ADMIN — METHYLPREDNISOLONE SODIUM SUCCINATE 40 MG: 40 INJECTION, POWDER, FOR SOLUTION INTRAMUSCULAR; INTRAVENOUS at 14:24

## 2018-07-22 RX ADMIN — BUDESONIDE 500 MCG: 0.5 INHALANT RESPIRATORY (INHALATION) at 07:10

## 2018-07-22 RX ADMIN — BUDESONIDE 500 MCG: 0.5 INHALANT RESPIRATORY (INHALATION) at 20:16

## 2018-07-22 RX ADMIN — IPRATROPIUM BROMIDE AND ALBUTEROL SULFATE 3 ML: .5; 3 SOLUTION RESPIRATORY (INHALATION) at 04:54

## 2018-07-22 RX ADMIN — FUROSEMIDE 20 MG: 20 TABLET ORAL at 09:53

## 2018-07-22 RX ADMIN — METHYLPREDNISOLONE SODIUM SUCCINATE 40 MG: 40 INJECTION, POWDER, FOR SOLUTION INTRAMUSCULAR; INTRAVENOUS at 06:43

## 2018-07-22 RX ADMIN — GUAIFENESIN 1200 MG: 600 TABLET, EXTENDED RELEASE ORAL at 09:53

## 2018-07-22 RX ADMIN — IPRATROPIUM BROMIDE AND ALBUTEROL SULFATE 3 ML: .5; 3 SOLUTION RESPIRATORY (INHALATION) at 11:06

## 2018-07-22 RX ADMIN — HYDROCODONE BITARTRATE AND ACETAMINOPHEN 1 TABLET: 10; 325 TABLET ORAL at 22:13

## 2018-07-22 RX ADMIN — ARFORMOTEROL TARTRATE 15 MCG: 15 SOLUTION RESPIRATORY (INHALATION) at 20:16

## 2018-07-22 RX ADMIN — Medication 10 ML: at 06:43

## 2018-07-22 RX ADMIN — HEPARIN SODIUM 5000 UNITS: 5000 INJECTION, SOLUTION INTRAVENOUS; SUBCUTANEOUS at 14:24

## 2018-07-22 RX ADMIN — HEPARIN SODIUM 5000 UNITS: 5000 INJECTION, SOLUTION INTRAVENOUS; SUBCUTANEOUS at 22:09

## 2018-07-22 RX ADMIN — Medication 10 ML: at 22:10

## 2018-07-22 RX ADMIN — IPRATROPIUM BROMIDE AND ALBUTEROL SULFATE 3 ML: .5; 3 SOLUTION RESPIRATORY (INHALATION) at 07:10

## 2018-07-22 RX ADMIN — IPRATROPIUM BROMIDE AND ALBUTEROL SULFATE 3 ML: .5; 3 SOLUTION RESPIRATORY (INHALATION) at 23:44

## 2018-07-22 RX ADMIN — TAMSULOSIN HYDROCHLORIDE 0.4 MG: 0.4 CAPSULE ORAL at 09:53

## 2018-07-22 NOTE — PROGRESS NOTES
Problem: Falls - Risk of  Goal: *Absence of Falls  Document Darell Fall Risk and appropriate interventions in the flowsheet.    Outcome: Progressing Towards Goal  Fall Risk Interventions:  Mobility Interventions: Assess mobility with egress test, Communicate number of staff needed for ambulation/transfer, Utilize walker, cane, or other assistive device         Medication Interventions: Evaluate medications/consider consulting pharmacy, Patient to call before getting OOB, Teach patient to arise slowly    Elimination Interventions: Call light in reach, Patient to call for help with toileting needs, Toileting schedule/hourly rounds, Urinal in reach

## 2018-07-22 NOTE — ROUTINE PROCESS
Bedside and Verbal shift change report given to MAURICIO Haq (oncoming nurse) by Dhaval Salcedo RN  (offgoing nurse). Report included the following information SBAR, Kardex, Intake/Output and MAR. Scrapes (Abrasions): Care Instructions  Your Care Instructions  Scrapes (abrasions) are wounds where your skin has been rubbed or torn off. Most scrapes do not go deep into the skin, but some may remove several layers of skin. Scrapes usually don't bleed much, but they may ooze pinkish fluid. Scrapes on the head or face may appear worse than they are. They may bleed a lot because of the good blood supply to this area. Most scrapes heal well and may not need a bandage. They usually heal within 3 to 7 days. A large, deep scrape may take 1 to 2 weeks or longer to heal. A scab may form on some scrapes. Follow-up care is a key part of your treatment and safety. Be sure to make and go to all appointments, and call your doctor if you are having problems. It's also a good idea to know your test results and keep a list of the medicines you take. How can you care for yourself at home? · If your doctor told you how to care for your wound, follow your doctor's instructions. If you did not get instructions, follow this general advice:  ¨ Wash the scrape with clean water 2 times a day. Don't use hydrogen peroxide or alcohol, which can slow healing. ¨ You may cover the scrape with a thin layer of petroleum jelly, such as Vaseline, and a nonstick bandage. ¨ Apply more petroleum jelly and replace the bandage as needed. · Prop up the injured area on a pillow anytime you sit or lie down during the next 3 days. Try to keep it above the level of your heart. This will help reduce swelling. · Be safe with medicines. Take pain medicines exactly as directed. ¨ If the doctor gave you a prescription medicine for pain, take it as prescribed. ¨ If you are not taking a prescription pain medicine, ask your doctor if you can take an over-the-counter medicine. When should you call for help?   Call your doctor now or seek immediate medical care if:  · You have signs of infection, such as:  ¨ Increased pain, swelling, warmth, or redness around the scrape. ¨ Red streaks leading from the scrape. ¨ Pus draining from the scrape. ¨ A fever. · The scrape starts to bleed, and blood soaks through the bandage. Oozing small amounts of blood is normal.  Watch closely for changes in your health, and be sure to contact your doctor if the scrape is not getting better each day. Where can you learn more? Go to http://jared-bill.info/. Enter A374 in the search box to learn more about \"Scrapes (Abrasions): Care Instructions. \"  Current as of: May 27, 2016  Content Version: 11.1  © 6977-0251 [employer]. Care instructions adapted under license by [employer] (which disclaims liability or warranty for this information). If you have questions about a medical condition or this instruction, always ask your healthcare professional. [place] [address] any warranty or liability for your use of this information. Bruises: Care Instructions  Your Care Instructions    Bruises occur when small blood vessels under the skin tear or rupture, most often from a twist, bump, or fall. Blood leaks into tissues under the skin and causes a black-and-blue spot that often turns colors, including purplish black, reddish blue, or yellowish green, as the bruise heals. Bruises hurt, but most are not serious and will go away on their own within 2 to 4 weeks. Sometimes, gravity causes them to spread down the body. A leg bruise usually will take longer to heal than a bruise on the face or arms. Follow-up care is a key part of your treatment and safety. Be sure to make and go to all appointments, and call your doctor if you are having problems. Its also a good idea to know your test results and keep a list of the medicines you take. How can you care for yourself at home? · Take pain medicines exactly as directed. ¨ If the doctor gave you a prescription medicine for pain, take it as prescribed.   ¨ If you are not taking a prescription pain medicine, ask your doctor if you can take an over-the-counter medicine. · Put ice or a cold pack on the area for 10 to 20 minutes at a time. Put a thin cloth between the ice and your skin. · If you can, prop up the bruised area on pillows as much as possible for the next few days. Try to keep the bruise above the level of your heart. When should you call for help? Call your doctor now or seek immediate medical care if:  · You have signs of infection, such as:  ¨ Increased pain, swelling, warmth, or redness. ¨ Red streaks leading from the bruise. ¨ Pus draining from the bruise. ¨ A fever. · You have a bruise on your leg and signs of a blood clot, such as:  ¨ Increasing redness and swelling along with warmth, tenderness, and pain in the bruised area. ¨ Pain in your calf, back of the knee, thigh, or groin. ¨ Redness and swelling in your leg or groin. · Your pain gets worse. Watch closely for changes in your health, and be sure to contact your doctor if:  · You do not get better as expected. Where can you learn more? Go to http://jared-bill.info/. Enter [phone] in the search box to learn more about \"Bruises: Care Instructions. \"  Current as of: May 27, 2016  Content Version: 11.1  © 6688-3095 [employer]. Care instructions adapted under license by [employer] (which disclaims liability or warranty for this information). If you have questions about a medical condition or this instruction, always ask your healthcare professional. [place] [address] any warranty or liability for your use of this information. Scrapes (Abrasions): Care Instructions  Your Care Instructions  Scrapes (abrasions) are wounds where your skin has been rubbed or torn off. Most scrapes do not go deep into the skin, but some may remove several layers of skin. Scrapes usually don't bleed much, but they may ooze pinkish fluid.  Scrapes on the head or face may appear worse than they are. They may bleed a lot because of the good blood supply to this area. Most scrapes heal well and may not need a bandage. They usually heal within 3 to 7 days. A large, deep scrape may take 1 to 2 weeks or longer to heal. A scab may form on some scrapes. Follow-up care is a key part of your treatment and safety. Be sure to make and go to all appointments, and call your doctor if you are having problems. It's also a good idea to know your test results and keep a list of the medicines you take. How can you care for yourself at home? · If your doctor told you how to care for your wound, follow your doctor's instructions. If you did not get instructions, follow this general advice:  ¨ Wash the scrape with clean water 2 times a day. Don't use hydrogen peroxide or alcohol, which can slow healing. ¨ You may cover the scrape with a thin layer of petroleum jelly, such as Vaseline, and a nonstick bandage. ¨ Apply more petroleum jelly and replace the bandage as needed. · Prop up the injured area on a pillow anytime you sit or lie down during the next 3 days. Try to keep it above the level of your heart. This will help reduce swelling. · Be safe with medicines. Take pain medicines exactly as directed. ¨ If the doctor gave you a prescription medicine for pain, take it as prescribed. ¨ If you are not taking a prescription pain medicine, ask your doctor if you can take an over-the-counter medicine. When should you call for help? Call your doctor now or seek immediate medical care if:  · You have signs of infection, such as:  ¨ Increased pain, swelling, warmth, or redness around the scrape. ¨ Red streaks leading from the scrape. ¨ Pus draining from the scrape. ¨ A fever. · The scrape starts to bleed, and blood soaks through the bandage.  Oozing small amounts of blood is normal.  Watch closely for changes in your health, and be sure to contact your doctor if the scrape is not getting better each day. Where can you learn more? Go to http://jared-bill.info/. Enter A374 in the search box to learn more about \"Scrapes (Abrasions): Care Instructions. \"  Current as of: May 27, 2016  Content Version: 11.1  © 3510-6460 [employer]. Care instructions adapted under license by [employer] (which disclaims liability or warranty for this information). If you have questions about a medical condition or this instruction, always ask your healthcare professional. [address] any warranty or liability for your use of this information.

## 2018-07-22 NOTE — PROGRESS NOTES
RESPIRATORY NOTE:  Pt has been on room air most of day, no complaints of shortness of breath SPO2 approximately 93-94%, comfortable at rest.

## 2018-07-22 NOTE — PROGRESS NOTES
SHIFT SUMMARY: No acute events. Pt started shift being anxious about sleeping, stating that he couldn't breathe. He was advised about the tussonex and norco. Able to sleep during this shift afterwards. He was placed on 4L O2 by RT.

## 2018-07-22 NOTE — ROUTINE PROCESS
Bedside shift change report given to Devora Chaves RN (oncoming nurse) by Melvin Sung RN (offgoing nurse). Report included the following information SBAR, Kardex, ED Summary, Intake/Output, MAR and Recent Results.

## 2018-07-22 NOTE — PROGRESS NOTES
Hospitalist Progress Note    Patient: Juanice Rubinstein MRN: [id number]  CSN: [id number]    YOB: 1945  Age: 74 y.o. Sex: male    DOA: 7/20/2018 LOS:  LOS: 2 days          Chief Complaint:copd exacerbation          Assessment/Plan       Disposition :  Patient Active Problem List   Diagnosis Code    COPD (chronic obstructive pulmonary disease) ([facility] J44.9    URIEL (acute kidney injury) ([facility] N17.9    Hypertension I10    Tobacco abuse Z72.0    Status asthmaticus with COPD (chronic obstructive pulmonary disease) [facility] J44.9, J45.902    PVC's (premature ventricular contractions) I49.3    COPD (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 ([facility]) J44.9    COPD with asthma and status asthmaticus ([facility]) J44.9, J45.902    Chronic renal disease, stage 3, moderately decreased glomerular filtration rate between 30-59 mL/min/1.73 square meter N18.3     75 y/o male admitted with copd exacerbation. Copd exacerbation  CKD stage 3. HTN  BPH    -Still needs inpatient care. Still tight and wheezing.  -Wean off steroids.  -Can probably dc tomorrow on po abx/steroids. Subjective:  No complaints. Review of systems:    Constitutional: denies fevers, chills, myalgias  Respiratory: denies SOB, cough  Cardiovascular: denies chest pain, palpitations  Gastrointestinal: denies nausea, vomiting, diarrhea      Vital signs/Intake and Output:  Visit Vitals    /71 (BP 1 Location: Right arm, BP Patient Position: At rest)    Pulse 85    Temp 98.7 °F (37.1 °C)    Resp 22    Ht 5' 8\" (1.727 m)    Wt 91.4 kg (201 lb 6.4 oz)    SpO2 98%    BMI 30.62 kg/m2     Current Shift:  07/22 0701 - 07/22 1900  In: 240 [P.O.:240]  Out: 300 [Urine:300]  Last three shifts:  07/20 1901 - 07/22 0700  In: 920 [P.O.:720;  I.V.:200]  Out: 1650 [Urine:1650]    Exam:    General: Well developed, alert, NAD, OX3  Head/Neck: NCAT, supple, No masses, No lymphadenopathy  CVS:Regular rate and rhythm, no M/R/G, S1/S2 heard, no thrill  Lungs:b/ moderate expiratory wheezes. No distress. O2 via NC in place. Abdomen: Soft, bs+  Extremities: No edema                Labs: Results:       Chemistry Recent Labs      07/22/18   0450  07/21/18   0145 07/20/18   1225   GLU  147*  156*  114*   NA  133*  133*  134*   K  3.7  3.5  3.5   CL  96*  94*  95*   CO2  29  25  29   BUN  36*  26*  22*   CREA  1.51*  1.69*  1.61*   CA  9.0  9.5  9.4   AGAP  8  14  10   BUCR  24*  15  14   AP   --    --   97   TP   --    --   7.3   ALB   --    --   3.1*   GLOB   --    --   4.2*   AGRAT   --    --   0.7*      CBC w/Diff Recent Labs      07/22/18   0450  07/21/18   0145 07/20/18   1225   WBC  12.5  13.0  9.9   RBC  3.65*  3.80*  4.11*   HGB  10.7*  11.2*  12.1*   HCT  31.6*  32.7*  35.4*   PLT  314  369  371   GRANS   --    --   74*   LYMPH   --    --   8*   EOS   --    --   7*      Cardiac Enzymes Recent Labs      07/20/18   1225   CPK  109   CKND1  3.9      Coagulation Recent Labs      07/20/18   1225   PTP  12.8   INR  1.0       Lipid Panel No results found for: CHOL, CHOLPOCT, CHOLX, CHLST, CHOLV, 185638, HDL, LDL, LDLC, DLDLP, 201804, VLDLC, VLDL, TGLX, TRIGL, TRIGP, TGLPOCT, CHHD, CHHDX   BNP No results for input(s): BNPP in the last 72 hours.    Liver Enzymes Recent Labs      07/20/18   1225   TP  7.3   ALB  3.1*   AP  97   SGOT  20      Thyroid Studies No results found for: T4, T3U, TSH, TSHEXT     Procedures/imaging: see electronic medical records for all procedures/Xrays and details which were not copied into this note but were reviewed prior to creation of Saleem Lassiter MD

## 2018-07-22 NOTE — PROGRESS NOTES
Report given to Donis Car RN    Room #: 303/01     Age: 74 y.o. Code status: Full Code     Admission date: 7/20/2018     GLOS: 1-2 days      Admitting MD: Krystal Mendoza, seen today by Jerod Moya     Consults: none   A&Ox 4   Isolation: There are currently no Active Isolations     Precautions: high fall risk, [facility]  Admission dx -   Encounter Diagnoses     ICD-10-CM ICD-9-CM   1. COPD with asthma and status asthmaticus ([facility] J44.9 493.21    J45.902      Allergies: Allergies   Allergen Reactions    Aspirin Other (comments)     \"messes my stomach up\"     Plan - successfully weaned to RA today; per Dr. Jerod Moya, wean IV steroids and may discharge on oral regimen tomorrow.   Patient Vitals for the past 12 hrs:   Temp Pulse Resp BP SpO2   07/22/18 1534 - - - - 94 %   07/22/18 1502 98.6 °F (37 °C) 75 22 116/61 95 %   07/22/18 1116 98.2 °F (36.8 °C) 75 24 122/61 93 %   07/22/18 1107 - - - - 93 %   07/22/18 0956 - - - - 96 %   07/22/18 0720 98.7 °F (37.1 °C) 85 22 140/71 98 %   07/22/18 0713 - - - - 98 %       Diagnosis Date    Cancer ([facility]     prostate    COPD (chronic obstructive pulmonary disease) ([facility]     Hypertension     Pneumonia     Radiation effect         Activity: Out of bed with assistance (cane, walker)    Fall risk: high (3)     DVT prophy: Heparin  IV access: 22 LH     CV - Tele: No     Resp - O2: RA       Lung sounds: grunting, in/ex wheezing, dysp w/exertion and productive cough improving       IS: 750     GI/ - Urinary status: voiding  Abd - Last BM: 7/19     Bowel sounds: hypo     Diet: DIET REGULAR  Skin - intact     Pain - denies  Recent Results (from the past 12 hour(s))   GLUCOSE, POC    Collection Time: 07/21/18  8:46 PM   Result Value Ref Range    Glucose (POC) 144 (H) 70 - 664 mg/dL   METABOLIC PANEL, BASIC    Collection Time: 07/22/18  4:50 AM   Result Value Ref Range    Sodium 133 (L) 136 - 145 mmol/L    Potassium 3.7 3.5 - 5.5 mmol/L    Chloride 96 (L) 100 - 108 mmol/L    CO2 29 21 - 32 mmol/L    Anion gap 8 3.0 - 18 mmol/L    Glucose 147 (H) 74 - 99 mg/dL    BUN 36 (H) 7.0 - 18 MG/DL    Creatinine 1.51 (H) 0.6 - 1.3 MG/DL    BUN/Creatinine ratio 24 (H) 12 - 20      GFR est AA 55 (L) >60 ml/min/1.73m2    GFR est non-AA 45 (L) >60 ml/min/1.73m2    Calcium 9.0 8.5 - 10.1 MG/DL   CBC W/O DIFF    Collection Time: 07/22/18  4:50 AM   Result Value Ref Range    WBC 12.5 4.6 - 13.2 K/uL    RBC 3.65 (L) 4.70 - 5.50 M/uL    HGB 10.7 (L) 13.0 - 16.0 g/dL    HCT 31.6 (L) 36.0 - 48.0 %    MCV 86.6 74.0 - 97.0 FL    MCH 29.3 24.0 - 34.0 PG    MCHC 33.9 31.0 - 37.0 g/dL    RDW 13.2 11.6 - 14.5 %    PLATELET 599 346 - 126 K/uL    MPV 8.7 (L) 9.2 - 11.8 FL       Current Facility-Administered Medications:     methylPREDNISolone (PF) (SOLU-MEDROL) injection 40 mg, 40 mg, IntraVENous, Q8H, Isaiah Aponte MD, 40 mg at 07/22/18 7439    guaiFENesin ER (MUCINEX) tablet 1,200 mg, 1,200 mg, Oral, Q12H, Isaiah Aponte MD, 1,200 mg at 07/21/18 2102    chlorpheniramine-HYDROcodone (TUSSIONEX) oral suspension 5 mL, 5 mL, Oral, QHS PRN, Isaiah Aponte MD, 5 mL at 07/21/18 2102    sodium chloride (NS) flush 5-10 mL, 5-10 mL, IntraVENous, PRN, Adriana Ley MD    sodium chloride (NS) flush 5-10 mL, 5-10 mL, IntraVENous, Q8H, Ned Lux MD, 10 mL at 07/22/18 5764    sodium chloride (NS) flush 5-10 mL, 5-10 mL, IntraVENous, PRN, Ned Lux MD    budesonide (PULMICORT) 500 mcg/2 ml nebulizer suspension, 500 mcg, Nebulization, BID RT, Ned Lux MD, 500 mcg at 07/21/18 2018    acetaminophen (TYLENOL) tablet 650 mg, 650 mg, Oral, Q4H PRN, Ned Lux MD    [facility]) injection 0.4 mg, 0.4 mg, IntraVENous, PRN, Ned Lux MD    ondansetron [facility]) injection 4 mg, 4 mg, IntraVENous, Q4H PRN, Ned Lux MD    docusate sodium (COLACE) capsule 100 mg, 100 mg, Oral, BID, Ned Lux MD, 100 mg at 07/21/18 2102    heparin (porcine) injection 5,000 Units, 5,000 Units, SubCUTAneous, Q8H, Ned Lux MD, 5,000 Units at 07/22/18 0643    levoFLOXacin (LEVAQUIN) 500 mg in D5W IVPB, 500 mg, IntraVENous, Q24H, Vishnu Villafana MD, Last Rate: 100 mL/hr at 07/21/18 1455, 500 mg at 07/21/18 1455    arformoterol (BROVANA) neb solution 15 mcg, 15 mcg, Nebulization, BID RT, Vishnu Villafana MD, 15 mcg at 07/21/18 2018    furosemide (LASIX) tablet 20 mg, 20 mg, Oral, DAILY, Vishnu Villafana MD, 20 mg at 07/21/18 0825    tamsulosin (FLOMAX) capsule 0.4 mg, 0.4 mg, Oral, DAILY, Vishnu Villafana MD, 0.4 mg at 07/21/18 0825    insulin lispro (HUMALOG) injection, , SubCUTAneous, AC&HS, Vishnu Villafana MD, Stopped at 07/21/18 2341    glucose chewable tablet 16 g, 4 Tab, Oral, PRN, Vishnu Villafana MD    glucagon [facility]) injection 1 mg, 1 mg, IntraMUSCular, PRN, Vishnu Villafana MD    dextrose (D50W) injection syrg 12.5-25 g, 25-50 mL, IntraVENous, PRN, Vishnu Villafana MD    pantoprazole (PROTONIX) tablet 40 mg, 40 mg, Oral, ACB, Vishnu Villafana MD, 40 mg at 07/22/18 0644    albuterol-ipratropium (DUO-NEB) 2.5 MG-0.5 MG/3 ML, 3 mL, Nebulization, Q4H RT, Vishnu Villafana MD, 3 mL at 07/22/18 0454    HYDROcodone-acetaminophen (NORCO)  mg tablet 1 Tab, 1 Tab, Oral, Q4H PRN, Fernie Griggs MD, 1 Tab at 07/21/18 2340

## 2018-07-23 VITALS
WEIGHT: 202.1 LBS | SYSTOLIC BLOOD PRESSURE: 136 MMHG | RESPIRATION RATE: 20 BRPM | HEIGHT: 68 IN | BODY MASS INDEX: 30.63 KG/M2 | OXYGEN SATURATION: 97 % | HEART RATE: 84 BPM | DIASTOLIC BLOOD PRESSURE: 64 MMHG | TEMPERATURE: 98.3 F

## 2018-07-23 LAB
ANION GAP SERPL CALC-SCNC: 10 MMOL/L (ref 3–18)
BUN SERPL-MCNC: 39 MG/DL (ref 7–18)
BUN/CREAT SERPL: 26 (ref 12–20)
CALCIUM SERPL-MCNC: 8.8 MG/DL (ref 8.5–10.1)
CHLORIDE SERPL-SCNC: 98 MMOL/L (ref 100–108)
CO2 SERPL-SCNC: 29 MMOL/L (ref 21–32)
CREAT SERPL-MCNC: 1.51 MG/DL (ref 0.6–1.3)
ERYTHROCYTE [DISTWIDTH] IN BLOOD BY AUTOMATED COUNT: 13.2 % (ref 11.6–14.5)
GLUCOSE BLD STRIP.AUTO-MCNC: 138 MG/DL (ref 70–110)
GLUCOSE BLD STRIP.AUTO-MCNC: 185 MG/DL (ref 70–110)
GLUCOSE SERPL-MCNC: 137 MG/DL (ref 74–99)
HCT VFR BLD AUTO: 32.2 % (ref 36–48)
HGB BLD-MCNC: 10.8 G/DL (ref 13–16)
MCH RBC QN AUTO: 29.4 PG (ref 24–34)
MCHC RBC AUTO-ENTMCNC: 33.5 G/DL (ref 31–37)
MCV RBC AUTO: 87.7 FL (ref 74–97)
PLATELET # BLD AUTO: 355 K/UL (ref 135–420)
PMV BLD AUTO: 8.8 FL (ref 9.2–11.8)
POTASSIUM SERPL-SCNC: 3.3 MMOL/L (ref 3.5–5.5)
RBC # BLD AUTO: 3.67 M/UL (ref 4.7–5.5)
SODIUM SERPL-SCNC: 137 MMOL/L (ref 136–145)
WBC # BLD AUTO: 10.2 K/UL (ref 4.6–13.2)

## 2018-07-23 PROCEDURE — 36415 COLL VENOUS BLD VENIPUNCTURE: CPT | Performed by: HOSPITALIST

## 2018-07-23 PROCEDURE — 77010033678 HC OXYGEN DAILY

## 2018-07-23 PROCEDURE — 82962 GLUCOSE BLOOD TEST: CPT

## 2018-07-23 PROCEDURE — 94640 AIRWAY INHALATION TREATMENT: CPT

## 2018-07-23 PROCEDURE — 74011250637 HC RX REV CODE- 250/637: Performed by: HOSPITALIST

## 2018-07-23 PROCEDURE — 74011000250 HC RX REV CODE- 250: Performed by: HOSPITALIST

## 2018-07-23 PROCEDURE — 85027 COMPLETE CBC AUTOMATED: CPT | Performed by: HOSPITALIST

## 2018-07-23 PROCEDURE — 74011250636 HC RX REV CODE- 250/636: Performed by: HOSPITALIST

## 2018-07-23 PROCEDURE — 94760 N-INVAS EAR/PLS OXIMETRY 1: CPT

## 2018-07-23 PROCEDURE — 80048 BASIC METABOLIC PNL TOTAL CA: CPT | Performed by: HOSPITALIST

## 2018-07-23 PROCEDURE — 74011250636 HC RX REV CODE- 250/636: Performed by: INTERNAL MEDICINE

## 2018-07-23 PROCEDURE — 74011250637 HC RX REV CODE- 250/637: Performed by: INTERNAL MEDICINE

## 2018-07-23 RX ORDER — LEVOFLOXACIN 500 MG/1
500 TABLET, FILM COATED ORAL DAILY
Qty: 2 TAB | Refills: 0 | Status: SHIPPED | OUTPATIENT
Start: 2018-07-23 | End: 2018-07-25

## 2018-07-23 RX ORDER — POTASSIUM CHLORIDE 20 MEQ/1
40 TABLET, EXTENDED RELEASE ORAL
Status: DISCONTINUED | OUTPATIENT
Start: 2018-07-23 | End: 2018-07-23

## 2018-07-23 RX ORDER — PREDNISONE 5 MG/1
TABLET ORAL
Qty: 21 TAB | Refills: 0 | Status: SHIPPED | OUTPATIENT
Start: 2018-07-23 | End: 2018-10-23

## 2018-07-23 RX ADMIN — HEPARIN SODIUM 5000 UNITS: 5000 INJECTION, SOLUTION INTRAVENOUS; SUBCUTANEOUS at 06:39

## 2018-07-23 RX ADMIN — ARFORMOTEROL TARTRATE 15 MCG: 15 SOLUTION RESPIRATORY (INHALATION) at 07:17

## 2018-07-23 RX ADMIN — GUAIFENESIN 1200 MG: 600 TABLET, EXTENDED RELEASE ORAL at 10:35

## 2018-07-23 RX ADMIN — IPRATROPIUM BROMIDE AND ALBUTEROL SULFATE 3 ML: .5; 3 SOLUTION RESPIRATORY (INHALATION) at 07:18

## 2018-07-23 RX ADMIN — DOCUSATE SODIUM 100 MG: 100 CAPSULE, LIQUID FILLED ORAL at 10:35

## 2018-07-23 RX ADMIN — FUROSEMIDE 20 MG: 20 TABLET ORAL at 10:35

## 2018-07-23 RX ADMIN — PANTOPRAZOLE SODIUM 40 MG: 40 TABLET, DELAYED RELEASE ORAL at 06:40

## 2018-07-23 RX ADMIN — BUDESONIDE 500 MCG: 0.5 INHALANT RESPIRATORY (INHALATION) at 07:18

## 2018-07-23 RX ADMIN — IPRATROPIUM BROMIDE AND ALBUTEROL SULFATE 3 ML: .5; 3 SOLUTION RESPIRATORY (INHALATION) at 11:08

## 2018-07-23 RX ADMIN — TAMSULOSIN HYDROCHLORIDE 0.4 MG: 0.4 CAPSULE ORAL at 10:35

## 2018-07-23 RX ADMIN — Medication 10 ML: at 06:39

## 2018-07-23 RX ADMIN — IPRATROPIUM BROMIDE AND ALBUTEROL SULFATE 3 ML: .5; 3 SOLUTION RESPIRATORY (INHALATION) at 04:49

## 2018-07-23 RX ADMIN — METHYLPREDNISOLONE SODIUM SUCCINATE 40 MG: 40 INJECTION, POWDER, FOR SOLUTION INTRAMUSCULAR; INTRAVENOUS at 06:39

## 2018-07-23 NOTE — PROGRESS NOTES
1930 - Assumed care of patient at this time. Patient is alert and oriented x 4. Patient is very hard of hearing. Patient denies chest pain or numbness or tingling in the upper or lower extremities. The patient is making a grunting sound with exhalation. 18g IV in the left hand is clean, dry and intact. Patient currently experiencing 0/10 pain. Bed is in lowest position with the wheels locked. Siderails x 3 are up. Call bell within reach. Patient is currently resting in bed in no apparent distress. Will continue to monitor. 2017 - Head to toe assessment performed at this time. Patient has no complaints of chest pain or shortness of breath. Denies any numbness or tingling in extremities. Lungs have inspiratory and expiratory wheezing upon auscultation. Patient's oxygen saturation is 90-91% on room air, so 1L via NC applied. Oxygen saturations increased to 95-96%. Patient continues to grunt with exhalation. Bowel sounds are present in all 4 quadrants. 18g IV in the left hand is patent when flushed with no signs of phlebitis or infiltration. Patient educated how to actively manage their pain. Patient encouraged to use the incentive spirometer. Patient educated on the side effects of medications. Explained the importance of ambulation. Instructed the patient not to attempt to get out of bed and/or ambulate without a staff member present. Patient verbalized understanding. Patient left in bed with call light within reach, bed in lowest position with wheels locked, and siderails x 3 up. Will continue to monitor. 2150 - Patient lying in bed with eyes closed, breathing regularly and evenly. Call bell within reach. Will continue to monitor. 2315 - Patient in bed resting quietly, watching television. No needs expressed at this time. Call bell within reach. Will continue to monitor. 0200 - Patient lying in bed with eyes closed, breathing regularly and evenly.   No expiratory grunting heard at this time. Call bell within reach. Will continue to monitor. 0320 - Patient lying in bed with eyes closed, breathing regularly and evenly. No expiratory grunting heard at this time. Call bell within reach. Will continue to monitor. 0571 - Patient lying in bed with eyes closed, breathing regularly and evenly. No expiratory grunting heard at this time. Call bell within reach. Will continue to monitor. 0881 - Patient lying in bed, resting quietly. No additional needs expressed at this time. Call bell within reach. Will continue to monitor.

## 2018-07-23 NOTE — DISCHARGE SUMMARY
Discharge Summary    Patient: Meg Lima MRN: [id number]  CSN: [id number]    YOB: 1945  Age: 74 y.o. Sex: male    DOA: 7/20/2018 LOS:  LOS: 3 days   Discharge Date:      Primary Care Provider:  Tania Gonzales MD    Admission Diagnoses: COPD with asthma and status asthmaticus [facility])    Discharge Diagnoses:    Hospital Problems  Date Reviewed: 4/20/2018          Codes Class Noted POA    * (Principal)COPD with asthma and status asthmaticus ([facility].) ICD-10-CM: J44.9, J45.902  ICD-9-CM: 493.21  7/20/2018 Unknown        Chronic renal disease, stage 3, moderately decreased glomerular filtration rate between 30-59 mL/min/1.73 square meter ICD-10-CM: N18.3  ICD-9-CM: 585.3  7/20/2018 Unknown        Status asthmaticus with COPD (chronic obstructive pulmonary disease) ([facility].) ICD-10-CM: J44.9, J45.902  ICD-9-CM: 493.21  4/20/2018 Yes        Hypertension ICD-10-CM: I10  ICD-9-CM: 401.9  Unknown Yes              Discharge Condition: Stable    Discharge Medications:     Current Discharge Medication List      START taking these medications    Details   levoFLOXacin (LEVAQUIN) 500 mg tablet Take 1 Tab by mouth daily for 2 days. Qty: 2 Tab, Refills: 0      predniSONE (STERAPRED) 5 mg dose pack See administration instruction per 5mg dose pack  Qty: 21 Tab, Refills: 0         CONTINUE these medications which have NOT CHANGED    Details   furosemide (LASIX) 20 mg tablet Take 20 mg by mouth daily. tamsulosin (FLOMAX) 0.4 mg capsule Take 0.4 mg by mouth daily. albuterol (PROVENTIL HFA, VENTOLIN HFA, PROAIR HFA) 90 mcg/actuation inhaler Take 2 Puffs by inhalation every four (4) hours as needed for Wheezing or Shortness of Breath. Qty: 1 Inhaler, Refills: 1      ipratropium (ATROVENT) 0.03 % nasal spray 2 Sprays every twelve (12) hours. albuterol (PROVENTIL VENTOLIN) 2.5 mg /3 mL (0.083 %) nebulizer solution 3 mL by Nebulization route every four (4) hours as needed for Wheezing.   Qty: 24 Each, Refills: 0 budesonide-formoterol (SYMBICORT) 160-4.5 mcg/actuation HFA inhaler Take 2 Puffs by inhalation two (2) times a day. Qty: 1 Inhaler, Refills: 0      chlorthalidone (HYGROTEN) 25 mg tablet Take  by mouth daily. Nebulizer & Compressor machine Dispense: 1 nebulizer machine w all tubing necessary  Qty: 1 each, Refills: 0         STOP taking these medications       predniSONE (DELTASONE) 10 mg tablet Comments:   Reason for Stopping:               Procedures : none     Consults: None      PHYSICAL EXAM   Visit Vitals    /61 (BP 1 Location: Right arm, BP Patient Position: At rest)    Pulse 73    Temp 98.3 °F (36.8 °C)    Resp 20    Ht 5' 8\" (1.727 m)    Wt 91.7 kg (202 lb 1.6 oz)    SpO2 96%    BMI 30.73 kg/m2     General: Awake, cooperative, no acute distress    HEENT: NC, Atraumatic. PERRLA, EOMI. Anicteric sclerae. Lungs:  CTA Bilaterally. No Wheezing/Rhonchi/Rales. Heart:  Regular  rhythm,  No murmur, No Rubs, No Gallops  Abdomen: Soft, Non distended, Non tender. +Bowel sounds,   Extremities: No c/c/e  Psych:   Not anxious or agitated. Neurologic:  No acute neurological deficits. Admission HPI :   Meg Lima is a 74 y.o. male who hx  COPD, HTN, PNA,  prostate cancer [facility] due [facility] worsening sob and wheezing for 2-3 weeks. It associated wheezing and dry cough. He used home inhaler for self treatment, not helping. He reported he can not afford Symbicort due to copay. He quitted smoking two months ago and will have an appointment with pulmonology in 2 weeks. Denies any slurred speech/headache/cp/n/v/blurred vission/d/c/palpitation/gait change/bleeding. Denies smoking/ any alcohol or drug use. Hospital Course :   Since he was admitted to the hospital, Iv steroid, breathing tx, levaquin for empiric treatment were administrated.  Cxr: Grossly stable appearance of the right lung base compatible with known complex pleural-based process/effusion with partial calcification. No significant change  or acute process. With the treatment, his wheezing resolved and his sob was back to his baseline. Before discharge, he weaned off nc O2 and not desat with walking test. He has been f/u with Dr. Raza Montoya as out-pt, encourage to continue following up with him. During his stay, his HTN was well controlled and his cr was at his base line. He remained hemodynamic stable. Activity: Activity as tolerated    Diet: Regular Diet    Follow-up: [facility] and Dr. Meyer-pulmonology    Disposition: home with home health     Minutes spent on discharge:  40 min     Labs: Results:       Chemistry Recent Labs      07/23/18   0406  07/22/18   0450  07/21/18   0145  07/20/18   1225   GLU  137*  147*  156*  114*   NA  137  133*  133*  134*   K  3.3*  3.7  3.5  3.5   CL  98*  96*  94*  95*   CO2  29  29  25  29   BUN  39*  36*  26*  22*   CREA  1.51*  1.51*  1.69*  1.61*   CA  8.8  9.0  9.5  9.4   AGAP  10  8  14  10   BUCR  26*  24*  15  14   AP   --    --    --   97   TP   --    --    --   7.3   ALB   --    --    --   3.1*   GLOB   --    --    --   4.2*   AGRAT   --    --    --   0.7*      CBC w/Diff Recent Labs      07/23/18   0406  07/22/18   0450  07/21/18   0145  07/20/18   1225   WBC  10.2  12.5  13.0  9.9   RBC  3.67*  3.65*  3.80*  4.11*   HGB  10.8*  10.7*  11.2*  12.1*   HCT  32.2*  31.6*  32.7*  35.4*   PLT  355  314  369  371   GRANS   --    --    --   74*   LYMPH   --    --    --   8*   EOS   --    --    --   7*      Cardiac Enzymes Recent Labs      07/20/18   1225   CPK  109   CKND1  3.9      Coagulation Recent Labs      07/20/18   1225   PTP  12.8   INR  1.0       Lipid Panel No results found for: CHOL, CHOLPOCT, CHOLX, CHLST, CHOLV, 651791, HDL, LDL, LDLC, DLDLP, 516644, VLDLC, VLDL, TGLX, TRIGL, TRIGP, TGLPOCT, CHHD, CHHDX   BNP No results for input(s): BNPP in the last 72 hours.    Liver Enzymes Recent Labs      07/20/18   1225   TP  7.3   ALB  3.1*   AP  97   SGOT  20 Thyroid Studies No results found for: T4, T3U, TSH, TSHEXT         Significant Diagnostic Studies: Xr Chest Port    Result Date: 7/20/2018  EXAM: One-view chest CLINICAL HISTORY: Sepsis , COMPARISON: None FINDINGS: Frontal view of the chest demonstrate stable opacities in the right mid and lower lung compatible with known pleural based calcification and complex pleural effusion. . Cardiac silhouette is normal in size and contour. No acute bony or soft tissue abnormality. IMPRESSION: Grossly stable appearance of the right lung base compatible with known complex pleural-based process/effusion with partial calcification. No significant change or acute process.             [facility]     CC: Jeannie Sampson MD

## 2018-07-23 NOTE — PROGRESS NOTES
Reason for Admission:   SOB               RRAT Score:    High; 23              Resources/supports as identified by patient/family:                   Top Challenges facing patient (as identified by patient/family and CM): Finances/Medication cost?                    Transportation? N/A              Support system or lack thereof? N/A                     Living arrangements? N/A           Self-care/ADLs/Cognition? N/A          Current Advanced Directive/Advance Care Plan: On file                          Plan for utilizing home health:    Pt declined                      Likelihood of readmission:  High                 Transition of Care Plan:                  Pt admitted due to SOB/COPD. Noted pt is to be discharged today with [facility] services. CM met with pt to discuss transition of care recommendations. Pt has declined  recommendation. Pt has indicated he has a cane to ambulate and his wife will assist as needed. No other plan of care needs have been identified. Pt's family will transport him home later today. Care Management Interventions  PCP Verified by CM: Yes  Mode of Transport at Discharge:  Other (see comment) (Family)  Transition of Care Consult (CM Consult): Discharge Planning  Health Maintenance Reviewed: Yes  Current Support Network: Lives with Spouse  Confirm Follow Up Transport: Family  Plan discussed with Pt/Family/Caregiver: Yes  Discharge Location  Discharge Placement: Home with family assistance

## 2018-07-23 NOTE — PROGRESS NOTES
AMD - At [facility] addressed [address] with the patient. Patient stated that he didnt need it. Patient was encouraged to have one brought in for scanning.  completed visit with patient and offered Pastoral care. Chaplains will continue to follow and will provide pastoral care as needed or requested.       Sister Seth Broussard, [place], [address]  [phone]

## 2018-07-23 NOTE — DISCHARGE INSTRUCTIONS
DISCHARGE SUMMARY from Nurse    PATIENT INSTRUCTIONS:    After general anesthesia or intravenous sedation, for 24 hours or while taking prescription Narcotics:  · Limit your activities  · Do not drive and operate hazardous machinery  · Do not make important personal or business decisions  · Do  not drink alcoholic beverages  · If you have not urinated within 8 hours after discharge, please contact your surgeon on call. Report the following to your surgeon:  · Excessive pain, swelling, redness or odor of or around the surgical area  · Temperature over 100.5  · Nausea and vomiting lasting longer than 4 hours or if unable to take medications  · Any signs of decreased circulation or nerve impairment to extremity: change in color, persistent  numbness, tingling, coldness or increase pain  · Any questions    What to do at Home:  Recommended activity: Activity as tolerated, ***    If you experience any abnormal  please follow up with pcp. *  Please give a list of your current medications to your Primary Care Provider. *  Please update this list whenever your medications are discontinued, doses are      changed, or new medications (including over-the-counter products) are added. *  Please carry medication information at all times in case of emergency situations. These are general instructions for a healthy lifestyle:  allergic rhinitis  Patient {ARMBANDS:06479}    No smoking/ No tobacco products/ Avoid exposure to second hand smoke  Surgeon General's Warning:  Quitting smoking now greatly reduces serious risk to your health.     Obesity, smoking, and sedentary lifestyle greatly increases your risk for illness    A healthy diet, regular physical exercise & weight monitoring are important for maintaining a healthy lifestyle    You may be retaining fluid if you have a history of heart failure or if you experience any of the following symptoms:  Weight gain of 3 pounds or more overnight or 5 pounds in a week, increased swelling in our hands or feet or shortness of breath while lying flat in bed. Please call your doctor as soon as you notice any of these symptoms; do not wait until your next office visit. Recognize signs and symptoms of STROKE:    F-face looks uneven    A-arms unable to move or move unevenly    S-speech slurred or non-existent    T-time-call 911 as soon as signs and symptoms begin-DO NOT go       Back to bed or wait to see if you get better-TIME IS BRAIN. Warning Signs of HEART ATTACK     Call 911 if you have these symptoms:   Chest discomfort. Most heart attacks involve discomfort in the center of the chest that lasts more than a few minutes, or that goes away and comes back. It can feel like uncomfortable pressure, squeezing, fullness, or pain.  Discomfort in other areas of the upper body. Symptoms can include pain or discomfort in one or both arms, the back, neck, jaw, or stomach.  Shortness of breath with or without chest discomfort.  Other signs may include breaking out in a cold sweat, nausea, or lightheadedness. Don't wait more than five minutes to call 911 - MINUTES MATTER! Fast action can save your life. Calling 911 is almost always the fastest way to get lifesaving treatment. Emergency Medical Services staff can begin treatment when they arrive -- up to an hour sooner than if someone gets to the hospital by car. The discharge information has been reviewed with the patient. The patient verbalized understanding. Discharge medications reviewed with the patient and appropriate educational materials and side effects teaching were provided.   Ken chu armband removed and shredded    ___________________________________________________________________________________________________________________________________

## 2018-07-23 NOTE — PROGRESS NOTES
Problem: Falls - Risk of  Goal: *Absence of Falls  Document Darell Fall Risk and appropriate interventions in the flowsheet.    Outcome: Progressing Towards Goal  Fall Risk Interventions:  Mobility Interventions: Assess mobility with egress test, Communicate number of staff needed for ambulation/transfer, Utilize walker, cane, or other assistive device         Medication Interventions: Patient to call before getting OOB, Teach patient to arise slowly    Elimination Interventions: Call light in reach, Patient to call for help with toileting needs, Toileting schedule/hourly rounds, Urinal in reach

## 2018-07-23 NOTE — PROGRESS NOTES
1145: walking test for home O2 completed. Patient ambulated without oxygen and O2 sats were 95-97% on room air.

## 2018-07-23 NOTE — ROUTINE PROCESS
Bedside and Verbal shift change report given to Tami Benitez RN (oncoming nurse) by Eva Severino RN (offgoing nurse). Report included the following information SBAR, Intake/Output and MAR.

## 2018-07-24 ENCOUNTER — PATIENT OUTREACH (OUTPATIENT)
Dept: CASE MANAGEMENT | Age: 73
End: 2018-07-24

## 2018-07-24 NOTE — PROGRESS NOTES
Hospital Discharge Follow-Up      Date/Time:  2018 10:38 AM    Patient was admitted to [facility] on 18 and discharged on 18 for COPD w/ asthma. The physician discharge summary was available at the time of outreach. Patient was contacted within 2 business days of discharge. Inpatient RRAT score: 21  Was this a readmission? no   Patient stated reason for the readmission: NA    Nurse Navigator (NN) contacted the patient's wife, Mrs. Lyndsay Fuentes by telephone to perform post hospital discharge assessment. Verified name and  with Mrs. Park as identifiers. Provided introduction to self, and explanation of the Nurse Navigator role. Reviewed discharge instructions and red flags with Mrs. Park who verbalized understanding. Mrs. Lyndsay Fuentes given an opportunity to ask questions and does not have any further questions or concerns at this time. Mrs. Lyndsay Fuentes agrees to contact the PCP office for questions related to the patient's healthcare. NN provided contact information for future reference. Disease Specific:   COPD  Patient will call physician at first sign of exacerbation and before symptoms become an emergency:  -Temp > 100.5  -Increased coughing  -Sputum green, varghese or brown in color  -Increased shortness of breath    Mrs. Park verbalizes understanding. Summary of patient's top problems:  1. COPD - Mrs. Park states patient does not have fever, increased shortness of breath at this time  2. Asthma - Mrs. Park states patient's cough \"is not as bad\", denies any phlegm production at this time. Home Health orders at discharge: Patient declined [facility]: NA  Date of initial visit: NA    Durable Medical Equipment ordered/company: NA  Durable Medical Equipment received: NA    Barriers to care? None noted at this time.     Advance Care Planning:   Does patient have an Advance Directive:  on file     Medication(s):   New Medications at Discharge: yes  Changed Medications at Discharge: no  Discontinued Medications at Discharge: yes    Mrs. Park verbalizes understanding of administration of home medications, states that pharmacy did not have Prednisone 5mg last evening when they picked up Levaquin but she is expecting a call from pharmacy today when medication is ready. There were no barriers to obtaining medications identified at this time. Referral to Pharm D needed: no     Current Outpatient Prescriptions   Medication Sig    levoFLOXacin (LEVAQUIN) 500 mg tablet Take 1 Tab by mouth daily for 2 days.  predniSONE (STERAPRED) 5 mg dose pack See administration instruction per 5mg dose pack    furosemide (LASIX) 20 mg tablet Take 20 mg by mouth daily.  albuterol (PROVENTIL HFA, VENTOLIN HFA, PROAIR HFA) 90 mcg/actuation inhaler Take 2 Puffs by inhalation every four (4) hours as needed for Wheezing or Shortness of Breath.  ipratropium (ATROVENT) 0.03 % nasal spray 2 Sprays every twelve (12) hours.  tamsulosin (FLOMAX) 0.4 mg capsule Take 0.4 mg by mouth daily.  albuterol (PROVENTIL VENTOLIN) 2.5 mg /3 mL (0.083 %) nebulizer solution 3 mL by Nebulization route every four (4) hours as needed for Wheezing.  budesonide-formoterol (SYMBICORT) 160-4.5 mcg/actuation HFA inhaler Take 2 Puffs by inhalation two (2) times a day.  chlorthalidone (HYGROTEN) 25 mg tablet Take  by mouth daily.  Nebulizer & Compressor machine Dispense: 1 nebulizer machine w all tubing necessary     No current facility-administered medications for this visit. There are no discontinued medications. [facility] follow up appointment(s): No future appointments. Non-[facility] follow up appointment(s): Dr. Marion Fernando - pulmonologist - 8/2/18  Mrs. Pippa Marcus states that patient has not yet contacted PCP office to schedule follow up appointment because \"he has been resting all day today\".

## 2018-07-26 LAB
BACTERIA SPEC CULT: NORMAL
BACTERIA SPEC CULT: NORMAL
SERVICE CMNT-IMP: NORMAL
SERVICE CMNT-IMP: NORMAL

## 2018-07-28 LAB
ATRIAL RATE: 100 BPM
CALCULATED P AXIS, ECG09: 74 DEGREES
CALCULATED R AXIS, ECG10: 58 DEGREES
CALCULATED T AXIS, ECG11: 67 DEGREES
DIAGNOSIS, 93000: NORMAL
P-R INTERVAL, ECG05: 162 MS
Q-T INTERVAL, ECG07: 354 MS
QRS DURATION, ECG06: 82 MS
QTC CALCULATION (BEZET), ECG08: 456 MS
VENTRICULAR RATE, ECG03: 100 BPM

## 2018-08-07 ENCOUNTER — PATIENT OUTREACH (OUTPATIENT)
Dept: CASE MANAGEMENT | Age: 73
End: 2018-08-07

## 2018-08-07 NOTE — PROGRESS NOTES
Follow up Dx:  [facility] 7/20-7/23/18 COPD w/ asthma    NN attempted to contact patient at listed number. VM left identifying self, direct contact information given with request for return call. NN will attempt to contact patient at later time.

## 2018-09-08 ENCOUNTER — APPOINTMENT (OUTPATIENT)
Dept: GENERAL RADIOLOGY | Age: 73
End: 2018-09-08
Attending: PHYSICIAN ASSISTANT
Payer: MEDICARE

## 2018-09-08 ENCOUNTER — HOSPITAL ENCOUNTER (EMERGENCY)
Age: 73
Discharge: HOME | End: 2018-09-08
Attending: EMERGENCY MEDICINE
Payer: MEDICARE

## 2018-09-08 VITALS
HEIGHT: 68 IN | BODY MASS INDEX: 30.31 KG/M2 | RESPIRATION RATE: 23 BRPM | DIASTOLIC BLOOD PRESSURE: 68 MMHG | HEART RATE: 88 BPM | OXYGEN SATURATION: 100 % | WEIGHT: 200 LBS | SYSTOLIC BLOOD PRESSURE: 158 MMHG | TEMPERATURE: 99.9 F

## 2018-09-08 DIAGNOSIS — R82.71 BACTERIA IN URINE: ICD-10-CM

## 2018-09-08 DIAGNOSIS — R06.2 WHEEZING ON AUSCULTATION: ICD-10-CM

## 2018-09-08 DIAGNOSIS — D64.9 ANEMIA, UNSPECIFIED TYPE: ICD-10-CM

## 2018-09-08 DIAGNOSIS — R74.8 ELEVATED ALKALINE PHOSPHATASE LEVEL: ICD-10-CM

## 2018-09-08 DIAGNOSIS — M25.50 DIFFUSE ARTHRALGIA: Primary | ICD-10-CM

## 2018-09-08 DIAGNOSIS — E87.6 HYPOKALEMIA: ICD-10-CM

## 2018-09-08 DIAGNOSIS — N28.9 RENAL INSUFFICIENCY: ICD-10-CM

## 2018-09-08 LAB
ALBUMIN SERPL-MCNC: 2.6 G/DL (ref 3.4–5)
ALBUMIN/GLOB SERPL: 0.5 {RATIO} (ref 0.8–1.7)
ALP SERPL-CCNC: 221 U/L (ref 45–117)
ALT SERPL-CCNC: 15 U/L (ref 16–61)
ANION GAP SERPL CALC-SCNC: 17 MMOL/L (ref 3–18)
APPEARANCE UR: CLEAR
AST SERPL-CCNC: 14 U/L (ref 15–37)
BACTERIA URNS QL MICRO: ABNORMAL /HPF
BASOPHILS # BLD: 0 K/UL (ref 0–0.1)
BASOPHILS NFR BLD: 0 % (ref 0–2)
BILIRUB SERPL-MCNC: 0.5 MG/DL (ref 0.2–1)
BILIRUB UR QL: NEGATIVE
BUN SERPL-MCNC: 41 MG/DL (ref 7–18)
BUN/CREAT SERPL: 26 (ref 12–20)
CALCIUM SERPL-MCNC: 9.7 MG/DL (ref 8.5–10.1)
CHLORIDE SERPL-SCNC: 96 MMOL/L (ref 100–108)
CO2 SERPL-SCNC: 25 MMOL/L (ref 21–32)
COLOR UR: YELLOW
CREAT SERPL-MCNC: 1.59 MG/DL (ref 0.6–1.3)
DIFFERENTIAL METHOD BLD: ABNORMAL
EOSINOPHIL # BLD: 0.2 K/UL (ref 0–0.4)
EOSINOPHIL NFR BLD: 2 % (ref 0–5)
ERYTHROCYTE [DISTWIDTH] IN BLOOD BY AUTOMATED COUNT: 13.7 % (ref 11.6–14.5)
GLOBULIN SER CALC-MCNC: 5.3 G/DL (ref 2–4)
GLUCOSE SERPL-MCNC: 108 MG/DL (ref 74–99)
GLUCOSE UR STRIP.AUTO-MCNC: NEGATIVE MG/DL
HCT VFR BLD AUTO: 34.5 % (ref 36–48)
HGB BLD-MCNC: 11.5 G/DL (ref 13–16)
HGB UR QL STRIP: NEGATIVE
KETONES UR QL STRIP.AUTO: ABNORMAL MG/DL
LACTATE SERPL-SCNC: 0.9 MMOL/L (ref 0.4–2)
LEUKOCYTE ESTERASE UR QL STRIP.AUTO: NEGATIVE
LYMPHOCYTES # BLD: 0.7 K/UL (ref 0.9–3.6)
LYMPHOCYTES NFR BLD: 8 % (ref 21–52)
MAGNESIUM SERPL-MCNC: 2 MG/DL (ref 1.6–2.6)
MCH RBC QN AUTO: 29.7 PG (ref 24–34)
MCHC RBC AUTO-ENTMCNC: 33.3 G/DL (ref 31–37)
MCV RBC AUTO: 89.1 FL (ref 74–97)
MONOCYTES # BLD: 0.8 K/UL (ref 0.05–1.2)
MONOCYTES NFR BLD: 8 % (ref 3–10)
NEUTS SEG # BLD: 7.6 K/UL (ref 1.8–8)
NEUTS SEG NFR BLD: 82 % (ref 40–73)
NITRITE UR QL STRIP.AUTO: NEGATIVE
PH UR STRIP: 5.5 [PH] (ref 5–8)
PLATELET # BLD AUTO: 402 K/UL (ref 135–420)
PMV BLD AUTO: 9 FL (ref 9.2–11.8)
POTASSIUM SERPL-SCNC: 3 MMOL/L (ref 3.5–5.5)
PROT SERPL-MCNC: 7.9 G/DL (ref 6.4–8.2)
PROT UR STRIP-MCNC: ABNORMAL MG/DL
RBC # BLD AUTO: 3.87 M/UL (ref 4.7–5.5)
RBC #/AREA URNS HPF: ABNORMAL /HPF (ref 0–5)
SODIUM SERPL-SCNC: 138 MMOL/L (ref 136–145)
SP GR UR REFRACTOMETRY: 1.02 (ref 1–1.03)
UROBILINOGEN UR QL STRIP.AUTO: 1 EU/DL (ref 0.2–1)
WBC # BLD AUTO: 9.3 K/UL (ref 4.6–13.2)
WBC URNS QL MICRO: ABNORMAL /HPF (ref 0–5)

## 2018-09-08 PROCEDURE — 96360 HYDRATION IV INFUSION INIT: CPT

## 2018-09-08 PROCEDURE — 87086 URINE CULTURE/COLONY COUNT: CPT | Performed by: PHYSICIAN ASSISTANT

## 2018-09-08 PROCEDURE — 94640 AIRWAY INHALATION TREATMENT: CPT

## 2018-09-08 PROCEDURE — 83605 ASSAY OF LACTIC ACID: CPT | Performed by: PHYSICIAN ASSISTANT

## 2018-09-08 PROCEDURE — 87040 BLOOD CULTURE FOR BACTERIA: CPT | Performed by: PHYSICIAN ASSISTANT

## 2018-09-08 PROCEDURE — 74011250637 HC RX REV CODE- 250/637: Performed by: PHYSICIAN ASSISTANT

## 2018-09-08 PROCEDURE — 99285 EMERGENCY DEPT VISIT HI MDM: CPT

## 2018-09-08 PROCEDURE — 74011250636 HC RX REV CODE- 250/636: Performed by: PHYSICIAN ASSISTANT

## 2018-09-08 PROCEDURE — 71045 X-RAY EXAM CHEST 1 VIEW: CPT

## 2018-09-08 PROCEDURE — 87081 CULTURE SCREEN ONLY: CPT | Performed by: EMERGENCY MEDICINE

## 2018-09-08 PROCEDURE — 93005 ELECTROCARDIOGRAM TRACING: CPT

## 2018-09-08 PROCEDURE — 74011000250 HC RX REV CODE- 250: Performed by: PHYSICIAN ASSISTANT

## 2018-09-08 PROCEDURE — 85025 COMPLETE CBC W/AUTO DIFF WBC: CPT | Performed by: PHYSICIAN ASSISTANT

## 2018-09-08 PROCEDURE — 81001 URINALYSIS AUTO W/SCOPE: CPT | Performed by: EMERGENCY MEDICINE

## 2018-09-08 PROCEDURE — 77030013140 HC MSK NEB VYRM -A

## 2018-09-08 PROCEDURE — 83735 ASSAY OF MAGNESIUM: CPT | Performed by: PHYSICIAN ASSISTANT

## 2018-09-08 PROCEDURE — 80053 COMPREHEN METABOLIC PANEL: CPT | Performed by: PHYSICIAN ASSISTANT

## 2018-09-08 RX ORDER — POTASSIUM CHLORIDE 20 MEQ/1
20 TABLET, EXTENDED RELEASE ORAL 3 TIMES DAILY
Qty: 9 TAB | Refills: 0 | Status: SHIPPED | OUTPATIENT
Start: 2018-09-08 | End: 2018-09-11

## 2018-09-08 RX ORDER — CEPHALEXIN 500 MG/1
500 CAPSULE ORAL 2 TIMES DAILY
Qty: 14 CAP | Refills: 0 | Status: SHIPPED | OUTPATIENT
Start: 2018-09-08 | End: 2018-09-15

## 2018-09-08 RX ORDER — HYDROCODONE BITARTRATE AND ACETAMINOPHEN 5; 325 MG/1; MG/1
1 TABLET ORAL
Status: COMPLETED | OUTPATIENT
Start: 2018-09-08 | End: 2018-09-08

## 2018-09-08 RX ORDER — LIDOCAINE 40 MG/G
CREAM TOPICAL
Qty: 15 G | Refills: 0 | Status: SHIPPED | OUTPATIENT
Start: 2018-09-08 | End: 2019-02-08

## 2018-09-08 RX ORDER — IPRATROPIUM BROMIDE AND ALBUTEROL SULFATE 2.5; .5 MG/3ML; MG/3ML
3 SOLUTION RESPIRATORY (INHALATION) ONCE
Status: COMPLETED | OUTPATIENT
Start: 2018-09-08 | End: 2018-09-08

## 2018-09-08 RX ORDER — SODIUM CHLORIDE 0.9 % (FLUSH) 0.9 %
5-10 SYRINGE (ML) INJECTION AS NEEDED
Status: DISCONTINUED | OUTPATIENT
Start: 2018-09-08 | End: 2018-09-08 | Stop reason: HOSPADM

## 2018-09-08 RX ORDER — DEXTROMETHORPHAN HYDROBROMIDE, GUAIFENESIN 5; 100 MG/5ML; MG/5ML
650 LIQUID ORAL EVERY 8 HOURS
Qty: 15 TAB | Refills: 0 | Status: SHIPPED | OUTPATIENT
Start: 2018-09-08 | End: 2019-07-12

## 2018-09-08 RX ORDER — ACETAMINOPHEN 325 MG/1
650 TABLET ORAL
Status: COMPLETED | OUTPATIENT
Start: 2018-09-08 | End: 2018-09-08

## 2018-09-08 RX ADMIN — HYDROCODONE BITARTRATE AND ACETAMINOPHEN 1 TABLET: 5; 325 TABLET ORAL at 13:10

## 2018-09-08 RX ADMIN — POTASSIUM BICARBONATE 50 MEQ: 25 TABLET, EFFERVESCENT ORAL at 11:25

## 2018-09-08 RX ADMIN — IPRATROPIUM BROMIDE AND ALBUTEROL SULFATE 3 ML: .5; 3 SOLUTION RESPIRATORY (INHALATION) at 13:26

## 2018-09-08 RX ADMIN — IPRATROPIUM BROMIDE AND ALBUTEROL SULFATE 3 ML: .5; 3 SOLUTION RESPIRATORY (INHALATION) at 10:12

## 2018-09-08 RX ADMIN — SODIUM CHLORIDE 1000 ML: 900 INJECTION, SOLUTION INTRAVENOUS at 10:46

## 2018-09-08 RX ADMIN — ACETAMINOPHEN 650 MG: 325 TABLET, FILM COATED ORAL at 10:45

## 2018-09-08 NOTE — DISCHARGE INSTRUCTIONS
Knee Pain or Injury: Care Instructions  Your Care Instructions    Injuries are a common cause of knee problems. Sudden (acute) injuries may be caused by a direct blow to the knee. They can also be caused by abnormal twisting, bending, or falling on the knee. Pain, bruising, or swelling may be severe, and may start within minutes of the injury. Overuse is another cause of knee pain. Other causes are climbing stairs, kneeling, and other activities that use the knee. Everyday wear and tear, especially as you get older, also can cause knee pain. Rest, along with home treatment, often relieves pain and allows your knee to heal. If you have a serious knee injury, you may need tests and treatment. Follow-up care is a key part of your treatment and safety. Be sure to make and go to all appointments, and call your doctor if you are having problems. It's also a good idea to know your test results and keep a list of the medicines you take. How can you care for yourself at home? · Be safe with medicines. Read and follow all instructions on the label. ¨ If the doctor gave you a prescription medicine for pain, take it as prescribed. ¨ If you are not taking a prescription pain medicine, ask your doctor if you can take an over-the-counter medicine. · Rest and protect your knee. Take a break from any activity that may cause pain. · Put ice or a cold pack on your knee for 10 to 20 minutes at a time. Put a thin cloth between the ice and your skin. · Prop up a sore knee on a pillow when you ice it or anytime you sit or lie down for the next 3 days. Try to keep it above the level of your heart. This will help reduce swelling. · If your knee is not swollen, you can put moist heat, a heating pad, or a warm cloth on your knee. · If your doctor recommends an elastic bandage, sleeve, or other type of support for your knee, wear it as directed.   · Follow your doctor's instructions about how much weight you can put on your leg. Use a cane, crutches, or a walker as instructed. · Follow your doctor's instructions about activity during your healing process. If you can do mild exercise, slowly increase your activity. · Reach and stay at a healthy weight. Extra weight can strain the joints, especially the knees and hips, and make the pain worse. Losing even a few pounds may help. When should you call for help? Call 911 anytime you think you may need emergency care. For example, call if:    · You have symptoms of a blood clot in your lung (called a pulmonary embolism). These may include:  ¨ Sudden chest pain. ¨ Trouble breathing. ¨ Coughing up blood.    Call your doctor now or seek immediate medical care if:    · You have severe or increasing pain.     · Your leg or foot turns cold or changes color.     · You cannot stand or put weight on your knee.     · Your knee looks twisted or bent out of shape.     · You cannot move your knee.     · You have signs of infection, such as:  ¨ Increased pain, swelling, warmth, or redness. ¨ Red streaks leading from the knee. ¨ Pus draining from a place on your knee. ¨ A fever.     · You have signs of a blood clot in your leg (called a deep vein thrombosis), such as:  ¨ Pain in your calf, back of the knee, thigh, or groin. ¨ Redness and swelling in your leg or groin.    Watch closely for changes in your health, and be sure to contact your doctor if:    · You have tingling, weakness, or numbness in your knee.     · You have any new symptoms, such as swelling.     · You have bruises from a knee injury that last longer than 2 weeks.     · You do not get better as expected. Where can you learn more? Go to http://ifeoma-jana.info/. Enter K195 in the search box to learn more about \"Knee Pain or Injury: Care Instructions. \"  Current as of: November 20, 2017  Content Version: 11.7  © 0603-3810 [employer].  Care instructions adapted under license by [facility] (which disclaims liability or warranty for this information). If you have questions about a medical condition or this instruction, always ask your healthcare professional. [address] any warranty or liability for your use of this information. Anemia: Care Instructions  Your Care Instructions    Anemia is a low level of red blood cells, which carry oxygen throughout your body. Many things can cause anemia. Lack of iron is one of the most common causes. Your body needs iron to make hemoglobin, a substance in red blood cells that carries oxygen from the lungs to your body's cells. Without enough iron, the body produces fewer and smaller red blood cells. As a result, your body's cells do not get enough oxygen, and you feel tired and weak. And you may have trouble concentrating. Bleeding is the most common cause of a lack of iron. You may have heavy menstrual bleeding or bleeding caused by conditions such as ulcers, hemorrhoids, or cancer. Regular use of aspirin or other anti-inflammatory medicines (such as ibuprofen) also can cause bleeding in some people. A lack of iron in your diet also can cause anemia, especially at times when the body needs more iron, such as during pregnancy, infancy, and the teen years. Your doctor may have prescribed iron pills. It may take several months of treatment for your iron levels to return to normal. Your doctor also may suggest that you eat foods that are rich in iron, such as meat and beans. There are many other causes of anemia. It is not always due to a lack of iron. Finding the specific cause of your anemia will help your doctor find the right treatment for you. Follow-up care is a key part of your treatment and safety. Be sure to make and go to all appointments, and call your doctor if you are having problems. It's also a good idea to know your test results and keep a list of the medicines you take.   How can you care for yourself at home?  · Take your medicines exactly as prescribed. Call your doctor if you think you are having a problem with your medicine. · If your doctor recommends iron pills, take them as directed:  ¨ Try to take the pills on an empty stomach about 1 hour before or 2 hours after meals. But you may need to take iron with food to avoid an upset stomach. ¨ Do not take antacids or drink milk or caffeine drinks (such as coffee, tea, or cola) at the same time or within 2 hours of the time that you take your iron. They can make it hard for your body to absorb the iron. ¨ Vitamin C (from food or supplements) helps your body absorb iron. Try taking iron pills with a glass of orange juice or some other food that is high in vitamin C, such as citrus fruits. ¨ Iron pills may cause stomach problems, such as heartburn, nausea, diarrhea, constipation, and cramps. Be sure to drink plenty of fluids, and include fruits, vegetables, and fiber in your diet each day. Iron pills often make your bowel movements dark or green. ¨ If you forget to take an iron pill, do not take a double dose of iron the next time you take a pill. ¨ Keep iron pills out of the reach of small children. An overdose of iron can be very dangerous. · Follow your doctor's advice about eating iron-rich foods. These include red meat, shellfish, poultry, eggs, beans, raisins, whole-grain bread, and leafy green vegetables. · Steam vegetables to help them keep their iron content. When should you call for help? Call 911 anytime you think you may need emergency care. For example, call if:    · You have symptoms of a heart attack. These may include:  ¨ Chest pain or pressure, or a strange feeling in the chest.  ¨ Sweating. ¨ Shortness of breath. ¨ Nausea or vomiting. ¨ Pain, pressure, or a strange feeling in the back, neck, jaw, or upper belly or in one or both shoulders or arms. ¨ Lightheadedness or sudden weakness. ¨ A fast or irregular heartbeat.   After you call 911, the  may tell you to chew 1 adult-strength or 2 to 4 low-dose aspirin. Wait for an ambulance. Do not try to drive yourself.     · You passed out (lost consciousness).    Call your doctor now or seek immediate medical care if:    · You have new or increased shortness of breath.     · You are dizzy or lightheaded, or you feel like you may faint.     · Your fatigue and weakness continue or get worse.     · You have any abnormal bleeding, such as:  ¨ Nosebleeds. ¨ Vaginal bleeding that is different (heavier, more frequent, at a different time of the month) than what you are used to. ¨ Bloody or black stools, or rectal bleeding. ¨ Bloody or pink urine.    Watch closely for changes in your health, and be sure to contact your doctor if:    · You do not get better as expected. Where can you learn more? Go to http://ifeomaHibernajana.info/. Enter R301 in the search box to learn more about \"Anemia: Care Instructions. \"  Current as of: October 9, 2017  Content Version: 11.7  © 7571-8834 [employer]. Care instructions adapted under license by [employer] (which disclaims liability or warranty for this information). If you have questions about a medical condition or this instruction, always ask your healthcare professional. [address] any warranty or liability for your use of this information. Hypokalemia: Care Instructions  Your Care Instructions    Hypokalemia (say \"ih-hx-rpu-FATIMAH-veronica-uh\") is a low level of potassium. The heart, muscles, kidneys, and nervous system all need potassium to work well. This problem has many different causes. Kidney problems, diet, and medicines like diuretics and laxatives can cause it. So can vomiting or diarrhea. In some cases, cancer is the cause. Your doctor may do tests to find the cause of your low potassium levels.   You may need medicines to bring your potassium levels back to normal. You may also need regular blood tests to check your potassium. If you have very low potassium, you may need intravenous (IV) medicines. You also may need tests to check the electrical activity of your heart. Heart problems caused by low potassium levels can be very serious. Follow-up care is a key part of your treatment and safety. Be sure to make and go to all appointments, and call your doctor if you are having problems. It's also a good idea to know your test results and keep a list of the medicines you take. How can you care for yourself at home? · If your doctor recommends it, eat foods that have a lot of potassium. These include fresh fruits, juices, and vegetables. They also include nuts, beans, and milk. · Be safe with medicines. If your doctor prescribes medicines or potassium supplements, take them exactly as directed. Call your doctor if you have any problems with your medicines. · Get your potassium levels tested as often as your doctor tells you. When should you call for help? Call 911 anytime you think you may need emergency care. For example, call if:    · You feel like your heart is missing beats. Heart problems caused by low potassium can cause death.     · You passed out (lost consciousness).     · You have a seizure.    Call your doctor now or seek immediate medical care if:    · You feel weak or unusually tired.     · You have severe arm or leg cramps.     · You have tingling or numbness.     · You feel sick to your stomach, or you vomit.     · You have belly cramps.     · You feel bloated or constipated.     · You have to urinate a lot.     · You feel very thirsty most of the time.     · You are dizzy or lightheaded, or you feel like you may faint.     · You feel depressed, or you lose touch with reality.    Watch closely for changes in your health, and be sure to contact your doctor if:    · You do not get better as expected. Where can you learn more?   Go to http://ifeoma-jana.info/. Enter G358 in the search box to learn more about \"Hypokalemia: Care Instructions. \"  Current as of: May 12, 2017  Content Version: 11.7  © 9239-8210 [employer]. Care instructions adapted under license by [employer] (which disclaims liability or warranty for this information). If you have questions about a medical condition or this instruction, always ask your healthcare professional. [place] [address] any warranty or liability for your use of this information. Wheezing or Bronchoconstriction: Care Instructions  Your Care Instructions  Wheezing is a whistling noise made during breathing. It occurs when the small airways, or bronchial tubes, that lead to your lungs swell or contract (spasm) and become narrow. This narrowing is called bronchoconstriction. When your airways constrict, it is hard for air to pass through and this makes it hard for you to breathe. Wheezing and bronchoconstriction can be caused by many problems, including:  · An infection such as the flu or a cold. · Allergies such as hay fever. · Diseases such as asthma or chronic obstructive pulmonary disease. · Smoking. Treatment for your wheezing depends on what is causing the problem. Your wheezing may get better without treatment. But you may need to pay attention to things that cause your wheezing and avoid them. Or you may need medicine to help treat the wheezing and to reduce the swelling or to relieve spasms in your lungs. Follow-up care is a key part of your treatment and safety. Be sure to make and go to all appointments, and call your doctor if you are having problems. It is also a good idea to know your test results and keep a list of the medicines you take. How can you care for yourself at home? · Take your medicine exactly as prescribed. Call your doctor if you think you are having a problem with your medicine.  You will get more details on the specific medicine your doctor prescribes. · If your doctor prescribed antibiotics, take them as directed. Do not stop taking them just because you feel better. You need to take the full course of antibiotics. · Breathe moist air from a humidifier, hot shower, or sink filled with hot water. This may help ease your symptoms and make it easier for you to breathe. · If you have congestion in your nose and throat, drinking plenty of fluids, especially hot fluids, may help relieve your symptoms. If you have kidney, heart, or liver disease and have to limit fluids, talk with your doctor before you increase the amount of fluids you drink. · If you have mucus in your airways, it may help to breathe deeply and cough. · Do not smoke or allow others to smoke around you. Smoking can make your wheezing worse. If you need help quitting, talk to your doctor about stop-smoking programs and medicines. These can increase your chances of quitting for good. · Avoid things that may cause your wheezing. These may include colds, smoke, air pollution, dust, pollen, pets, cockroaches, stress, and cold air. When should you call for help? Call 911 anytime you think you may need emergency care. For example, call if:    · You have severe trouble breathing.     · You passed out (lost consciousness).    Call your doctor now or seek immediate medical care if:    · You cough up yellow, dark brown, or bloody mucus (sputum).     · You have new or worse shortness of breath.     · Your wheezing is not getting better or it gets worse after you start taking your medicine.    Watch closely for changes in your health, and be sure to contact your doctor if:    · You do not get better as expected. Where can you learn more? Go to http://ifeoma-jana.info/. Enter 454 [id number] in the search box to learn more about \"Wheezing or Bronchoconstriction: Care Instructions. \"  Current as of: December 6, 2017  Content Version: 11.7  © 4433-0110 [employer]. Care instructions adapted under license by [employer] (which disclaims liability or warranty for this information). If you have questions about a medical condition or this instruction, always ask your healthcare professional. [place] [address] any warranty or liability for your use of this information.

## 2018-09-08 NOTE — ED NOTES
Discharge instructions reviewed with the patient with opportunity for questions given. The patient verbalized understanding. Patient armband removed and shredded. Patient requesting x1 more breathing tx s/p transferring to wheelchair.  Chloe MENA made aware  O2 sats 97% RA, HR 93

## 2018-09-08 NOTE — Clinical Note
Antibiotic as prescribed for bacteria in urine. Tylenol arthritis as prescribed for joint aches and pains. Lidocaine cream as needed for aches and pains.

## 2018-09-08 NOTE — ED TRIAGE NOTES
Patient arrives via EMS from home residence with c/o ANANYA knee pain, flare up of arthritis. F/u with [facility] MD at [facility]. Patient with pain when sitting, or having 90 degrees angles. Patient endorses urinary problem of urine color and ANANYA shoulder pain.

## 2018-09-08 NOTE — ED PROVIDER NOTES
EMERGENCY DEPARTMENT HISTORY AND PHYSICAL EXAM    Date: 9/8/2018  Patient Name: Kevin Alarcon    History of Presenting Illness     Chief Complaint   Patient presents with    Knee Pain     ANANYA       History Provided By: Patient    Chief Complaint: knee pain  Duration: 1 Weeks  Timing:  Worsening  Location: bilateral  Quality: Burning  Associated Symptoms: fever, urine discoloration (yellow), urinary odor, abdominal pain, bilateral shoulder pain, and bilateral hand pain    Additional History (Context):   9:36 AM  Kevin Alarcon is a 74 y.o. male with PMHX of arthritis who presents to the emergency department C/O bilateral knee pain, described as burning, onset 1 week ago that feels like his typical arthritis pain. Associated symptoms include bilateral shoulder pain and bilateral hand pain. Pt states symptoms feel similar to arthritis but worse. Difficulty ambulating and bending the knees secondary to pain. Pt also report intermittent fever (Tmax 100.2 F), urine discoloration (yellow), urinary odor, lower abdominal pain, Denies recent trauma or injury. Pmhx of prostate cancer, in remission. Baseline SOB due to history of COPD. Denies dysuria, rash, nausea, vomiting, diarrhea, chest pain, worsening of SOB, cough. C/O bilateral foot swelling onset 6 months ago. States PCP prescribed \"water pills\" without relief. Pt denies any other Sx or complaints. PCP: Mauro Mcmillan MD    Current Facility-Administered Medications   Medication Dose Route Frequency Provider Last Rate Last Dose    sodium chloride (NS) flush 5-10 mL  5-10 mL IntraVENous PRN Daniel Young PA-C         Current Outpatient Prescriptions   Medication Sig Dispense Refill    acetaminophen (TYLENOL ARTHRITIS PAIN) 650 mg TbER Take 1 Tab by mouth every eight (8) hours. 15 Tab 0    cephALEXin (KEFLEX) 500 mg capsule Take 1 Cap by mouth two (2) times a day for 7 days.  14 Cap 0    lidocaine (XYLOCAINE) 4 % topical cream Apply  to affected area three (3) times daily as needed for Pain. 15 g 0    potassium chloride (K-DUR, KLOR-CON) 20 mEq tablet Take 1 Tab by mouth three (3) times daily for 3 days. 9 Tab 0    predniSONE (STERAPRED) 5 mg dose pack See administration instruction per 5mg dose pack 21 Tab 0    furosemide (LASIX) 20 mg tablet Take 20 mg by mouth daily.  albuterol (PROVENTIL HFA, VENTOLIN HFA, PROAIR HFA) 90 mcg/actuation inhaler Take 2 Puffs by inhalation every four (4) hours as needed for Wheezing or Shortness of Breath. 1 Inhaler 1    ipratropium (ATROVENT) 0.03 % nasal spray 2 Sprays every twelve (12) hours.  tamsulosin (FLOMAX) 0.4 mg capsule Take 0.4 mg by mouth daily.  albuterol (PROVENTIL VENTOLIN) 2.5 mg /3 mL (0.083 %) nebulizer solution 3 mL by Nebulization route every four (4) hours as needed for Wheezing. 24 Each 0    budesonide-formoterol (SYMBICORT) 160-4.5 mcg/actuation HFA inhaler Take 2 Puffs by inhalation two (2) times a day. 1 Inhaler 0    chlorthalidone (HYGROTEN) 25 mg tablet Take  by mouth daily.  Nebulizer & Compressor machine Dispense: 1 nebulizer machine w all tubing necessary 1 each 0       Past History     Past Medical History:  Past Medical History:   Diagnosis Date    Cancer ([address]     prostate    COPD (chronic obstructive pulmonary disease) ([address]     Hypertension     Pneumonia     Radiation effect        Past Surgical History:  Past Surgical History:   Procedure Laterality Date    HX HERNIA REPAIR         Family History:  Family History   Problem Relation Age of Onset    Heart Disease Mother        Social History:  Social History   Substance Use Topics    Smoking status: Former Smoker     Types: Cigarettes    Smokeless tobacco: Never Used    Alcohol use No       Allergies: Allergies   Allergen Reactions    Aspirin Other (comments)     \"messes my stomach up\"         Review of Systems   Review of Systems   Constitutional: Positive for fever ( Tmax 100.2).  Negative for appetite change. Respiratory: Positive for shortness of breath ( baseline for patient). Cardiovascular: Negative for chest pain. (+) foot swelling x 6 months. Gastrointestinal: Positive for abdominal pain. Negative for diarrhea, nausea and vomiting. Genitourinary: Negative for dysuria. (+) urine discoloration (yellow)   (+) urinary odor   Musculoskeletal: Positive for arthralgias (knee pain, shoulder pain) and myalgias (hand pain). Skin: Negative for rash. All other systems reviewed and are negative. Physical Exam     Vitals:    09/08/18 0947 09/08/18 0955 09/08/18 1012 09/08/18 1127   BP:    130/59   Pulse:    87   Resp:    22   Temp:       SpO2: 98% 97% 98% 99%   Weight:       Height:         Physical Exam   Constitutional: He appears well-developed and well-nourished. No distress. HENT:   Head: Normocephalic and atraumatic. Right Ear: External ear normal.   Left Ear: External ear normal.   Nose: Nose normal.   Eyes: Conjunctivae are normal.   Neck: Normal range of motion. Cardiovascular: Normal rate, regular rhythm and normal heart sounds. Pulmonary/Chest: Effort normal. No respiratory distress. He has wheezes. He has no rales. Faint wheezes heard in all lung fields. Abdominal: Soft. Bowel sounds are normal. He exhibits no distension. There is tenderness. There is no rebound and no guarding. Mild TTP in the bilateral lower quadrants and suprapubic region without rebound or guarding. Musculoskeletal: He exhibits tenderness. He exhibits no deformity. Diffuse TTP along the medial and lateral joint lines of the bilateral knees. ROM limited 2/2 pain. No edema, erythema, or warmth appreciated. Diffuse TTP along the bilateral shoulders, ROM limited 2/2 pain. No edema, erythema, or warmth appreciated. Diffuse TTP along the bilateral hands, ROM limited 2/2 pain. No edema, erythema, or warmth appreciated. Neurological: He is alert. Skin: Skin is warm and dry.  He is not diaphoretic. Feet with non-pitting edema bilaterally. No overlying erythema or warmth, no skin breaks appreciated. NVI. Calves are symmetric and non-tender. Psychiatric: He has a normal mood and affect. Nursing note and vitals reviewed. Diagnostic Study Results     Labs -     Recent Results (from the past 12 hour(s))   EKG, 12 LEAD, INITIAL    Collection Time: 09/08/18 10:00 AM   Result Value Ref Range    Ventricular Rate 96 BPM    Atrial Rate 96 BPM    P-R Interval 164 ms    QRS Duration 86 ms    Q-T Interval 358 ms    QTC Calculation (Bezet) 452 ms    Calculated P Axis 39 degrees    Calculated R Axis 3 degrees    Calculated T Axis 49 degrees    Diagnosis       Sinus rhythm with occasional premature ventricular complexes  Otherwise normal ECG  When compared with ECG of 20-JUL-2018 12:22,  aberrant conduction is no longer present     LACTIC ACID    Collection Time: 09/08/18 10:05 AM   Result Value Ref Range    Lactic acid 0.9 0.4 - 2.0 MMOL/L   METABOLIC PANEL, COMPREHENSIVE    Collection Time: 09/08/18 10:05 AM   Result Value Ref Range    Sodium 138 136 - 145 mmol/L    Potassium 3.0 (L) 3.5 - 5.5 mmol/L    Chloride 96 (L) 100 - 108 mmol/L    CO2 25 21 - 32 mmol/L    Anion gap 17 3.0 - 18 mmol/L    Glucose 108 (H) 74 - 99 mg/dL    BUN 41 (H) 7.0 - 18 MG/DL    Creatinine 1.59 (H) 0.6 - 1.3 MG/DL    BUN/Creatinine ratio 26 (H) 12 - 20      GFR est AA 52 (L) >60 ml/min/1.73m2    GFR est non-AA 43 (L) >60 ml/min/1.73m2    Calcium 9.7 8.5 - 10.1 MG/DL    Bilirubin, total 0.5 0.2 - 1.0 MG/DL    ALT (SGPT) 15 (L) 16 - 61 U/L    AST (SGOT) 14 (L) 15 - 37 U/L    Alk.  phosphatase 221 (H) 45 - 117 U/L    Protein, total 7.9 6.4 - 8.2 g/dL    Albumin 2.6 (L) 3.4 - 5.0 g/dL    Globulin 5.3 (H) 2.0 - 4.0 g/dL    A-G Ratio 0.5 (L) 0.8 - 1.7     CBC WITH AUTOMATED DIFF    Collection Time: 09/08/18 10:05 AM   Result Value Ref Range    WBC 9.3 4.6 - 13.2 K/uL    RBC 3.87 (L) 4.70 - 5.50 M/uL    HGB 11.5 (L) 13.0 - 16.0 g/dL    HCT 34.5 (L) 36.0 - 48.0 %    MCV 89.1 74.0 - 97.0 FL    MCH 29.7 24.0 - 34.0 PG    MCHC 33.3 31.0 - 37.0 g/dL    RDW 13.7 11.6 - 14.5 %    PLATELET 235 684 - 097 K/uL    MPV 9.0 (L) 9.2 - 11.8 FL    NEUTROPHILS 82 (H) 40 - 73 %    LYMPHOCYTES 8 (L) 21 - 52 %    MONOCYTES 8 3 - 10 %    EOSINOPHILS 2 0 - 5 %    BASOPHILS 0 0 - 2 %    ABS. NEUTROPHILS 7.6 1.8 - 8.0 K/UL    ABS. LYMPHOCYTES 0.7 (L) 0.9 - 3.6 K/UL    ABS. MONOCYTES 0.8 0.05 - 1.2 K/UL    ABS. EOSINOPHILS 0.2 0.0 - 0.4 K/UL    ABS. BASOPHILS 0.0 0.0 - 0.1 K/UL    DF AUTOMATED     MAGNESIUM    Collection Time: 09/08/18 10:05 AM   Result Value Ref Range    Magnesium 2.0 1.6 - 2.6 mg/dL   STREP THROAT SCREEN    Collection Time: 09/08/18 10:13 AM   Result Value Ref Range    Special Requests: NO SPECIAL REQUESTS      Strep Screen NEGATIVE       Strep Screen (NOTE)  TEST PERFORMED BY [facility] [id number]       Culture result: PENDING    URINALYSIS W/ RFLX MICROSCOPIC    Collection Time: 09/08/18 12:30 PM   Result Value Ref Range    Color YELLOW      Appearance CLEAR      Specific gravity 1.021 1.005 - 1.030      pH (UA) 5.5 5.0 - 8.0      Protein TRACE (A) NEG mg/dL    Glucose NEGATIVE  NEG mg/dL    Ketone TRACE (A) NEG mg/dL    Bilirubin NEGATIVE  NEG      Blood NEGATIVE  NEG      Urobilinogen 1.0 0.2 - 1.0 EU/dL    Nitrites NEGATIVE  NEG      Leukocyte Esterase NEGATIVE  NEG     URINE MICROSCOPIC ONLY    Collection Time: 09/08/18 12:30 PM   Result Value Ref Range    WBC 0 to 3 0 - 5 /hpf    RBC 0 to 3 0 - 5 /hpf    Bacteria FEW (A) NEG /hpf       Radiologic Studies -   XR CHEST PORT   Final Result   Impression:     1. No acute radiographic cardiopulmonary abnormality.   2. Right-sided pleural plaques, unchanged in radiographic appearance, and in  keeping with prior CT imaging of the chest.    As read by the radiologist.        CT Results  (Last 48 hours)    None        CXR Results  (Last 48 hours)               09/08/18 1038  XR CHEST PORT Final result Impression:  Impression:       1. No acute radiographic cardiopulmonary abnormality. 2. Right-sided pleural plaques, unchanged in radiographic appearance, and in   keeping with prior CT imaging of the chest.       Narrative:  Chest, single view       Indication: Chest pain and dizziness       Comparison: Several prior exams, to include CT scan of the chest 3/19/2017       Findings:  Portable upright AP view of the chest was obtained. Similar degree of   pulmonary inflation, without superimposed focal pneumonic opacity, pneumothorax   or pleural effusion. Calcified pleural-based plaques are again noted, unchanged. Stable cardiac size and mediastinal contours. No acute osseous abnormality. Medications given in the ED-  Medications   sodium chloride (NS) flush 5-10 mL (not administered)   sodium chloride 0.9 % bolus infusion 1,000 mL (0 mL IntraVENous IV Completed 9/8/18 1214)   albuterol-ipratropium (DUO-NEB) 2.5 MG-0.5 MG/3 ML (3 mL Nebulization Given 9/8/18 1012)   acetaminophen (TYLENOL) tablet 650 mg (650 mg Oral Given 9/8/18 1045)   potassium bicarbonate (KLYTE) tablet 50 mEq (50 mEq Oral Given 9/8/18 1125)   HYDROcodone-acetaminophen (NORCO) 5-325 mg per tablet 1 Tab (1 Tab Oral Given 9/8/18 1310)         Medical Decision Making   I am the first provider for this patient. I reviewed the vital signs, available nursing notes, past medical history, past surgical history, family history and social history. Vital Signs-Reviewed the patient's vital signs. Pulse Oximetry Analysis - 99% on RA     EKG interpretation: (Preliminary)  96 bpm. Sinus rhythm with occasional premature ventricular complexes. QRS duration 86 ms. QT/QTc 358/452 ms.    EKG read by Dayna Titus PA-C at 10:02 AM    Records Reviewed: Nursing Notes and Old Medical Records    Provider Notes (Medical Decision Making): Appears well and non-toxic, presenting with diffuse arthralgias that feels like arthritis pain and urine sx. D/T presenting vitals, sepsis alert initiated, WBC and lactic normal, pt is not septic. UA with bacteria, will tx. Labs are at essentially at baseline, renal fxn, h/h stable. K 3.0, given potassium here, will dc home with some. Diffuse arthralgias c/w arthritis, no signs of infection or septic joint. Based on today's assessment, I feel the patient is stable for discharge to home with outpatient follow up. Return precautions and referrals provided. Procedures:  Procedures    ED Course:   9:36 AM Initial assessment performed. The patients presenting problems have been discussed, and they are in agreement with the care plan formulated and outlined with them. I have encouraged them to ask questions as they arise throughout their visit. 9:54 AM Pt , RR 22, meeting two SIRS criteria and c/o urinary sx. Sepsis bundle initiated. 11:13 AM Pt reassessed. He is resting comfortable and in no acute distress. Throat was examined, unremarkable. RST negative. Pt noted to have low potassium and will need to receive potassium here. We are still waiting on urine sample. 1:08 PM Pt reassessed and updated on plans. He is resting comfortably at this time. Diagnosis and Disposition       DISCHARGE NOTE:  1:03 PM  [address] [place] [address]  results have been reviewed with him. He has been counseled regarding his diagnosis, treatment, and plan. He verbally conveys understanding and agreement of the signs, symptoms, diagnosis, treatment and prognosis and additionally agrees to follow up as discussed. He also agrees with the care-plan and conveys that all of his questions have been answered. I have also provided discharge instructions for him that include: educational information regarding their diagnosis and treatment, and list of reasons why they would want to return to the ED prior to their follow-up appointment, should his condition change.  He has been provided with education for proper emergency department utilization. CLINICAL IMPRESSION:    1. Diffuse arthralgia    2. Hypokalemia    3. Wheezing on auscultation    4. Bacteria in urine    5. Renal insufficiency    6. Anemia, unspecified type    7. Elevated alkaline phosphatase level        PLAN:  1. D/C Home  2. Current Discharge Medication List      START taking these medications    Details   acetaminophen (TYLENOL ARTHRITIS PAIN) 650 mg TbER Take 1 Tab by mouth every eight (8) hours. Qty: 15 Tab, Refills: 0      cephALEXin (KEFLEX) 500 mg capsule Take 1 Cap by mouth two (2) times a day for 7 days. Qty: 14 Cap, Refills: 0      lidocaine (XYLOCAINE) 4 % topical cream Apply  to affected area three (3) times daily as needed for Pain. Qty: 15 g, Refills: 0      potassium chloride (K-DUR, KLOR-CON) 20 mEq tablet Take 1 Tab by mouth three (3) times daily for 3 days. Qty: 9 Tab, Refills: 0           3. Follow-up Information     Follow up With Details Comments Contact Info    Mauro Mcmillan MD Schedule an appointment as soon as possible for a visit For primary care follow up [address] [place] [address] Charles Nance MD Schedule an appointment as soon as possible for a visit For follow up with orthopedics [address] EMERGENCY DEPT Go to As needed, as symptoms worsen [address] [place] [address]  [phone]        _______________________________    Attestations: This note is prepared by Monica Rivera, acting as Scribe for Daniel Young PA-C. Daniel Young PA-C:  The scribe's documentation has been prepared under my direction and personally reviewed by me in its entirety.   I confirm that the note above accurately reflects all work, treatment, procedures, and medical decision making performed by me.  _______________________________

## 2018-09-08 NOTE — ED NOTES
Patient reports feeling much better s/p breathing tx. Patient to lobby to wait for wife at this time.

## 2018-09-10 LAB
B-HEM STREP THROAT QL CULT: NEGATIVE
B-HEM STREP THROAT QL CULT: NORMAL
BACTERIA SPEC CULT: NORMAL
BACTERIA SPEC CULT: NORMAL
SERVICE CMNT-IMP: NORMAL
SERVICE CMNT-IMP: NORMAL

## 2018-10-07 LAB
ATRIAL RATE: 96 BPM
CALCULATED P AXIS, ECG09: 39 DEGREES
CALCULATED R AXIS, ECG10: 3 DEGREES
CALCULATED T AXIS, ECG11: 49 DEGREES
DIAGNOSIS, 93000: NORMAL
P-R INTERVAL, ECG05: 164 MS
Q-T INTERVAL, ECG07: 358 MS
QRS DURATION, ECG06: 86 MS
QTC CALCULATION (BEZET), ECG08: 452 MS
VENTRICULAR RATE, ECG03: 96 BPM

## 2018-10-19 ENCOUNTER — HOSPITAL ENCOUNTER (INPATIENT)
Age: 73
LOS: 2 days | Discharge: HOME HEALTH | DRG: 191 | End: 2018-10-23
Attending: EMERGENCY MEDICINE | Admitting: FAMILY MEDICINE
Payer: MEDICARE

## 2018-10-19 ENCOUNTER — APPOINTMENT (OUTPATIENT)
Dept: GENERAL RADIOLOGY | Age: 73
DRG: 191 | End: 2018-10-19
Attending: EMERGENCY MEDICINE
Payer: MEDICARE

## 2018-10-19 DIAGNOSIS — J45.902 COPD WITH ASTHMA AND STATUS ASTHMATICUS (HCC): Primary | ICD-10-CM

## 2018-10-19 DIAGNOSIS — J44.9 COPD WITH ASTHMA AND STATUS ASTHMATICUS (HCC): Primary | ICD-10-CM

## 2018-10-19 PROBLEM — J44.1 COPD EXACERBATION (HCC): Status: ACTIVE | Noted: 2018-10-19

## 2018-10-19 PROBLEM — J61 ASBESTOSIS (HCC): Status: ACTIVE | Noted: 2018-10-19

## 2018-10-19 LAB
ALBUMIN SERPL-MCNC: 3 G/DL (ref 3.4–5)
ALBUMIN/GLOB SERPL: 0.8 {RATIO} (ref 0.8–1.7)
ALP SERPL-CCNC: 89 U/L (ref 45–117)
ALT SERPL-CCNC: 16 U/L (ref 16–61)
ANION GAP SERPL CALC-SCNC: 7 MMOL/L (ref 3–18)
ARTERIAL PATENCY WRIST A: YES
AST SERPL-CCNC: 13 U/L (ref 15–37)
BASE DEFICIT BLD-SCNC: 1 MMOL/L
BASOPHILS # BLD: 0 K/UL (ref 0–0.1)
BASOPHILS NFR BLD: 0 % (ref 0–3)
BDY SITE: NORMAL
BILIRUB SERPL-MCNC: 0.3 MG/DL (ref 0.2–1)
BNP SERPL-MCNC: 47 PG/ML (ref 0–1800)
BODY TEMPERATURE: 98.7
BUN SERPL-MCNC: 19 MG/DL (ref 7–18)
BUN/CREAT SERPL: 13 (ref 12–20)
CALCIUM SERPL-MCNC: 8.9 MG/DL (ref 8.5–10.1)
CHLORIDE SERPL-SCNC: 99 MMOL/L (ref 100–108)
CK MB CFR SERPL CALC: 4.2 % (ref 0–4)
CK MB SERPL-MCNC: 4.6 NG/ML (ref 5–25)
CK SERPL-CCNC: 109 U/L (ref 39–308)
CO2 SERPL-SCNC: 28 MMOL/L (ref 21–32)
CREAT SERPL-MCNC: 1.44 MG/DL (ref 0.6–1.3)
DIFFERENTIAL METHOD BLD: ABNORMAL
EOSINOPHIL # BLD: 1.5 K/UL (ref 0–0.4)
EOSINOPHIL NFR BLD: 14 % (ref 0–5)
ERYTHROCYTE [DISTWIDTH] IN BLOOD BY AUTOMATED COUNT: 13.7 % (ref 11.6–14.5)
EST. AVERAGE GLUCOSE BLD GHB EST-MCNC: 123 MG/DL
FLUAV AG NPH QL IA: NEGATIVE
FLUBV AG NOSE QL IA: NEGATIVE
GAS FLOW.O2 O2 DELIVERY SYS: NORMAL L/MIN
GLOBULIN SER CALC-MCNC: 3.8 G/DL (ref 2–4)
GLUCOSE BLD STRIP.AUTO-MCNC: 138 MG/DL (ref 70–110)
GLUCOSE BLD STRIP.AUTO-MCNC: 146 MG/DL (ref 70–110)
GLUCOSE BLD STRIP.AUTO-MCNC: 149 MG/DL (ref 70–110)
GLUCOSE BLD STRIP.AUTO-MCNC: 155 MG/DL (ref 70–110)
GLUCOSE SERPL-MCNC: 122 MG/DL (ref 74–99)
HBA1C MFR BLD: 5.9 % (ref 4.5–5.6)
HCO3 BLD-SCNC: 23.8 MMOL/L (ref 22–26)
HCT VFR BLD AUTO: 34.8 % (ref 36–48)
HGB BLD-MCNC: 11.5 G/DL (ref 13–16)
LYMPHOCYTES # BLD: 1.4 K/UL (ref 0.8–3.5)
LYMPHOCYTES NFR BLD: 13 % (ref 20–51)
MCH RBC QN AUTO: 29.3 PG (ref 24–34)
MCHC RBC AUTO-ENTMCNC: 33 G/DL (ref 31–37)
MCV RBC AUTO: 88.8 FL (ref 74–97)
MONOCYTES # BLD: 0.2 K/UL (ref 0–1)
MONOCYTES NFR BLD: 2 % (ref 2–9)
NEUTS BAND NFR BLD MANUAL: 5 % (ref 0–5)
NEUTS SEG # BLD: 6.9 K/UL (ref 1.8–8)
NEUTS SEG NFR BLD: 66 % (ref 42–75)
O2/TOTAL GAS SETTING VFR VENT: 0.21 %
PCO2 BLD: 39.6 MMHG (ref 35–45)
PH BLD: 7.39 [PH] (ref 7.35–7.45)
PLATELET # BLD AUTO: 300 K/UL (ref 135–420)
PMV BLD AUTO: 8.5 FL (ref 9.2–11.8)
PO2 BLD: 83 MMHG (ref 80–100)
POTASSIUM SERPL-SCNC: 3.7 MMOL/L (ref 3.5–5.5)
PROT SERPL-MCNC: 6.8 G/DL (ref 6.4–8.2)
RBC # BLD AUTO: 3.92 M/UL (ref 4.7–5.5)
RBC MORPH BLD: ABNORMAL
SAO2 % BLD: 96 % (ref 92–97)
SERVICE CMNT-IMP: NORMAL
SODIUM SERPL-SCNC: 134 MMOL/L (ref 136–145)
SPECIMEN TYPE: NORMAL
TOTAL RESP. RATE, ITRR: 20
TROPONIN I SERPL-MCNC: <0.02 NG/ML (ref 0–0.06)
WBC # BLD AUTO: 10.4 K/UL (ref 4.6–13.2)

## 2018-10-19 PROCEDURE — 96365 THER/PROPH/DIAG IV INF INIT: CPT

## 2018-10-19 PROCEDURE — 99285 EMERGENCY DEPT VISIT HI MDM: CPT

## 2018-10-19 PROCEDURE — 82803 BLOOD GASES ANY COMBINATION: CPT

## 2018-10-19 PROCEDURE — 77030013140 HC MSK NEB VYRM -A

## 2018-10-19 PROCEDURE — 74011250637 HC RX REV CODE- 250/637: Performed by: HOSPITALIST

## 2018-10-19 PROCEDURE — 94760 N-INVAS EAR/PLS OXIMETRY 1: CPT

## 2018-10-19 PROCEDURE — 94640 AIRWAY INHALATION TREATMENT: CPT

## 2018-10-19 PROCEDURE — 83880 ASSAY OF NATRIURETIC PEPTIDE: CPT | Performed by: EMERGENCY MEDICINE

## 2018-10-19 PROCEDURE — 99218 HC RM OBSERVATION: CPT

## 2018-10-19 PROCEDURE — 93005 ELECTROCARDIOGRAM TRACING: CPT

## 2018-10-19 PROCEDURE — 94761 N-INVAS EAR/PLS OXIMETRY MLT: CPT

## 2018-10-19 PROCEDURE — 82550 ASSAY OF CK (CPK): CPT | Performed by: EMERGENCY MEDICINE

## 2018-10-19 PROCEDURE — 71045 X-RAY EXAM CHEST 1 VIEW: CPT

## 2018-10-19 PROCEDURE — 87804 INFLUENZA ASSAY W/OPTIC: CPT | Performed by: HOSPITALIST

## 2018-10-19 PROCEDURE — 83036 HEMOGLOBIN GLYCOSYLATED A1C: CPT | Performed by: HOSPITALIST

## 2018-10-19 PROCEDURE — 85025 COMPLETE CBC W/AUTO DIFF WBC: CPT | Performed by: EMERGENCY MEDICINE

## 2018-10-19 PROCEDURE — 74011000250 HC RX REV CODE- 250: Performed by: EMERGENCY MEDICINE

## 2018-10-19 PROCEDURE — 74011250636 HC RX REV CODE- 250/636: Performed by: HOSPITALIST

## 2018-10-19 PROCEDURE — 80053 COMPREHEN METABOLIC PANEL: CPT | Performed by: EMERGENCY MEDICINE

## 2018-10-19 PROCEDURE — 74011000250 HC RX REV CODE- 250: Performed by: INTERNAL MEDICINE

## 2018-10-19 PROCEDURE — 82962 GLUCOSE BLOOD TEST: CPT

## 2018-10-19 PROCEDURE — 74011000250 HC RX REV CODE- 250: Performed by: HOSPITALIST

## 2018-10-19 PROCEDURE — 36600 WITHDRAWAL OF ARTERIAL BLOOD: CPT

## 2018-10-19 RX ORDER — IPRATROPIUM BROMIDE AND ALBUTEROL SULFATE 2.5; .5 MG/3ML; MG/3ML
3 SOLUTION RESPIRATORY (INHALATION)
Status: COMPLETED | OUTPATIENT
Start: 2018-10-19 | End: 2018-10-19

## 2018-10-19 RX ORDER — SODIUM CHLORIDE 0.9 % (FLUSH) 0.9 %
5-10 SYRINGE (ML) INJECTION AS NEEDED
Status: DISCONTINUED | OUTPATIENT
Start: 2018-10-19 | End: 2018-10-23 | Stop reason: HOSPADM

## 2018-10-19 RX ORDER — BUDESONIDE 0.5 MG/2ML
500 INHALANT ORAL
Status: DISCONTINUED | OUTPATIENT
Start: 2018-10-19 | End: 2018-10-23 | Stop reason: HOSPADM

## 2018-10-19 RX ORDER — FAMOTIDINE 20 MG/1
20 TABLET, FILM COATED ORAL 2 TIMES DAILY
Status: DISCONTINUED | OUTPATIENT
Start: 2018-10-19 | End: 2018-10-23 | Stop reason: HOSPADM

## 2018-10-19 RX ORDER — IPRATROPIUM BROMIDE AND ALBUTEROL SULFATE 2.5; .5 MG/3ML; MG/3ML
SOLUTION RESPIRATORY (INHALATION)
Status: DISPENSED
Start: 2018-10-19 | End: 2018-10-19

## 2018-10-19 RX ORDER — CHLORTHALIDONE 25 MG/1
25 TABLET ORAL DAILY
Status: DISCONTINUED | OUTPATIENT
Start: 2018-10-19 | End: 2018-10-23 | Stop reason: HOSPADM

## 2018-10-19 RX ORDER — TAMSULOSIN HYDROCHLORIDE 0.4 MG/1
0.4 CAPSULE ORAL DAILY
Status: DISCONTINUED | OUTPATIENT
Start: 2018-10-19 | End: 2018-10-23 | Stop reason: HOSPADM

## 2018-10-19 RX ORDER — ONDANSETRON 2 MG/ML
4 INJECTION INTRAMUSCULAR; INTRAVENOUS
Status: DISCONTINUED | OUTPATIENT
Start: 2018-10-19 | End: 2018-10-23 | Stop reason: HOSPADM

## 2018-10-19 RX ORDER — LEVOFLOXACIN 5 MG/ML
INJECTION, SOLUTION INTRAVENOUS
Status: DISPENSED
Start: 2018-10-19 | End: 2018-10-19

## 2018-10-19 RX ORDER — ARFORMOTEROL TARTRATE 15 UG/2ML
15 SOLUTION RESPIRATORY (INHALATION)
Status: DISCONTINUED | OUTPATIENT
Start: 2018-10-19 | End: 2018-10-23 | Stop reason: HOSPADM

## 2018-10-19 RX ORDER — NALOXONE HYDROCHLORIDE 0.4 MG/ML
0.4 INJECTION, SOLUTION INTRAMUSCULAR; INTRAVENOUS; SUBCUTANEOUS AS NEEDED
Status: DISCONTINUED | OUTPATIENT
Start: 2018-10-19 | End: 2018-10-23 | Stop reason: HOSPADM

## 2018-10-19 RX ORDER — GUAIFENESIN 600 MG/1
600 TABLET, EXTENDED RELEASE ORAL EVERY 12 HOURS
Status: DISCONTINUED | OUTPATIENT
Start: 2018-10-19 | End: 2018-10-23 | Stop reason: HOSPADM

## 2018-10-19 RX ORDER — LEVOFLOXACIN 5 MG/ML
500 INJECTION, SOLUTION INTRAVENOUS EVERY 24 HOURS
Status: DISCONTINUED | OUTPATIENT
Start: 2018-10-19 | End: 2018-10-23 | Stop reason: HOSPADM

## 2018-10-19 RX ORDER — MAGNESIUM SULFATE 100 %
4 CRYSTALS MISCELLANEOUS AS NEEDED
Status: DISCONTINUED | OUTPATIENT
Start: 2018-10-19 | End: 2018-10-23 | Stop reason: HOSPADM

## 2018-10-19 RX ORDER — HEPARIN SODIUM 5000 [USP'U]/ML
5000 INJECTION, SOLUTION INTRAVENOUS; SUBCUTANEOUS EVERY 8 HOURS
Status: DISCONTINUED | OUTPATIENT
Start: 2018-10-19 | End: 2018-10-23 | Stop reason: HOSPADM

## 2018-10-19 RX ORDER — ACETAMINOPHEN 325 MG/1
650 TABLET ORAL
Status: DISCONTINUED | OUTPATIENT
Start: 2018-10-19 | End: 2018-10-23 | Stop reason: HOSPADM

## 2018-10-19 RX ORDER — IPRATROPIUM BROMIDE AND ALBUTEROL SULFATE 2.5; .5 MG/3ML; MG/3ML
3 SOLUTION RESPIRATORY (INHALATION)
Status: DISCONTINUED | OUTPATIENT
Start: 2018-10-19 | End: 2018-10-23 | Stop reason: HOSPADM

## 2018-10-19 RX ORDER — DIPHENHYDRAMINE HYDROCHLORIDE 50 MG/ML
12.5 INJECTION, SOLUTION INTRAMUSCULAR; INTRAVENOUS
Status: DISCONTINUED | OUTPATIENT
Start: 2018-10-19 | End: 2018-10-23 | Stop reason: HOSPADM

## 2018-10-19 RX ORDER — INSULIN LISPRO 100 [IU]/ML
INJECTION, SOLUTION INTRAVENOUS; SUBCUTANEOUS
Status: DISCONTINUED | OUTPATIENT
Start: 2018-10-19 | End: 2018-10-23 | Stop reason: HOSPADM

## 2018-10-19 RX ORDER — ALBUTEROL SULFATE 0.83 MG/ML
2.5 SOLUTION RESPIRATORY (INHALATION)
Status: DISCONTINUED | OUTPATIENT
Start: 2018-10-19 | End: 2018-10-23 | Stop reason: HOSPADM

## 2018-10-19 RX ORDER — DEXTROSE 50 % IN WATER (D50W) INTRAVENOUS SYRINGE
25-50 AS NEEDED
Status: DISCONTINUED | OUTPATIENT
Start: 2018-10-19 | End: 2018-10-23 | Stop reason: HOSPADM

## 2018-10-19 RX ORDER — ALBUTEROL SULFATE 0.83 MG/ML
5 SOLUTION RESPIRATORY (INHALATION)
Status: DISCONTINUED | OUTPATIENT
Start: 2018-10-19 | End: 2018-10-19

## 2018-10-19 RX ADMIN — IPRATROPIUM BROMIDE AND ALBUTEROL SULFATE 3 ML: .5; 3 SOLUTION RESPIRATORY (INHALATION) at 05:05

## 2018-10-19 RX ADMIN — GUAIFENESIN 600 MG: 600 TABLET, EXTENDED RELEASE ORAL at 09:42

## 2018-10-19 RX ADMIN — METHYLPREDNISOLONE SODIUM SUCCINATE 60 MG: 125 INJECTION, POWDER, FOR SOLUTION INTRAMUSCULAR; INTRAVENOUS at 07:04

## 2018-10-19 RX ADMIN — GUAIFENESIN 600 MG: 600 TABLET, EXTENDED RELEASE ORAL at 20:39

## 2018-10-19 RX ADMIN — METHYLPREDNISOLONE SODIUM SUCCINATE 60 MG: 125 INJECTION, POWDER, FOR SOLUTION INTRAMUSCULAR; INTRAVENOUS at 11:54

## 2018-10-19 RX ADMIN — HEPARIN SODIUM 5000 UNITS: 5000 INJECTION INTRAVENOUS; SUBCUTANEOUS at 15:52

## 2018-10-19 RX ADMIN — HEPARIN SODIUM 5000 UNITS: 5000 INJECTION INTRAVENOUS; SUBCUTANEOUS at 22:26

## 2018-10-19 RX ADMIN — ARFORMOTEROL TARTRATE 15 MCG: 15 SOLUTION RESPIRATORY (INHALATION) at 07:14

## 2018-10-19 RX ADMIN — HEPARIN SODIUM 5000 UNITS: 5000 INJECTION INTRAVENOUS; SUBCUTANEOUS at 07:06

## 2018-10-19 RX ADMIN — CHLORTHALIDONE 25 MG: 25 TABLET ORAL at 09:43

## 2018-10-19 RX ADMIN — METHYLPREDNISOLONE SODIUM SUCCINATE 60 MG: 125 INJECTION, POWDER, FOR SOLUTION INTRAMUSCULAR; INTRAVENOUS at 18:57

## 2018-10-19 RX ADMIN — METHYLPREDNISOLONE SODIUM SUCCINATE 60 MG: 125 INJECTION, POWDER, FOR SOLUTION INTRAMUSCULAR; INTRAVENOUS at 23:58

## 2018-10-19 RX ADMIN — IPRATROPIUM BROMIDE AND ALBUTEROL SULFATE 3 ML: .5; 3 SOLUTION RESPIRATORY (INHALATION) at 15:26

## 2018-10-19 RX ADMIN — BUDESONIDE 500 MCG: 0.5 INHALANT RESPIRATORY (INHALATION) at 07:14

## 2018-10-19 RX ADMIN — FAMOTIDINE 20 MG: 20 TABLET ORAL at 20:39

## 2018-10-19 RX ADMIN — ACETAMINOPHEN 650 MG: 325 TABLET ORAL at 15:52

## 2018-10-19 RX ADMIN — FAMOTIDINE 20 MG: 20 TABLET ORAL at 09:42

## 2018-10-19 RX ADMIN — IPRATROPIUM BROMIDE AND ALBUTEROL SULFATE 3 ML: .5; 3 SOLUTION RESPIRATORY (INHALATION) at 04:45

## 2018-10-19 RX ADMIN — LEVOFLOXACIN 500 MG: 5 INJECTION, SOLUTION INTRAVENOUS at 05:06

## 2018-10-19 RX ADMIN — IPRATROPIUM BROMIDE AND ALBUTEROL SULFATE 3 ML: .5; 3 SOLUTION RESPIRATORY (INHALATION) at 05:13

## 2018-10-19 RX ADMIN — IPRATROPIUM BROMIDE AND ALBUTEROL SULFATE 3 ML: .5; 3 SOLUTION RESPIRATORY (INHALATION) at 20:14

## 2018-10-19 RX ADMIN — IPRATROPIUM BROMIDE AND ALBUTEROL SULFATE 3 ML: .5; 3 SOLUTION RESPIRATORY (INHALATION) at 23:41

## 2018-10-19 RX ADMIN — DIPHENHYDRAMINE HYDROCHLORIDE 12.5 MG: 50 INJECTION, SOLUTION INTRAMUSCULAR; INTRAVENOUS at 20:39

## 2018-10-19 RX ADMIN — BUDESONIDE 500 MCG: 0.5 INHALANT RESPIRATORY (INHALATION) at 20:13

## 2018-10-19 RX ADMIN — IPRATROPIUM BROMIDE AND ALBUTEROL SULFATE 3 ML: .5; 3 SOLUTION RESPIRATORY (INHALATION) at 11:28

## 2018-10-19 RX ADMIN — IPRATROPIUM BROMIDE AND ALBUTEROL SULFATE 3 ML: .5; 3 SOLUTION RESPIRATORY (INHALATION) at 07:15

## 2018-10-19 RX ADMIN — TAMSULOSIN HYDROCHLORIDE 0.4 MG: 0.4 CAPSULE ORAL at 09:42

## 2018-10-19 RX ADMIN — ALBUTEROL SULFATE 5 MG: 2.5 SOLUTION RESPIRATORY (INHALATION) at 17:30

## 2018-10-19 NOTE — PROGRESS NOTES
0715; in to see patient for bedside report. Finishing breathing treatment. Gave patient menu and water. No further needs. Jr perez within reach. Will return for AM assessment. 0940: Assessed patient and passed medications. No further questions. Discussed plan of care with patient. Extremely pleasant and cooperative, no further needs at this time. 1115: Met with Josh from patient experience, patient stated he needed a breathing treatment and was unable to visualize RN and CNA phone numbers on the wall. Gave patient a bedside card with phone numbers to assist.     [phone]: Patient received breathing treatment. No acute needs at this time. Call bell within reach. 1336: patient sleeping quietly. No needs at this time. 1534: Patient resting quietly. Refilled water pitcher, no further needs. Complained of cough earlier that has since subsided. No medications or interventions needed. 1730: spoke with dr. Ana Alex regarding breathing treatments. Patient still experiencing SOB and significant wheezes. Medications adjusted accordingly.

## 2018-10-19 NOTE — H&P
History & Physical    Patient: Denys Juarez MRN: [id number]  CSN: [id number]    YOB: 1945  Age: 74 y.o. Sex: male      DOA: 10/19/2018  Primary Care Provider:  Chirag Contreras MD      Assessment/Plan     Hospital Problems  Date Reviewed: 4/20/2018          Codes Class Noted POA    COPD exacerbation ([facility] ICD-10-CM: J44.1  ICD-9-CM: 491.21  10/19/2018 Unknown        Asbestosis ([facility] ICD-10-CM: G90  ICD-9-CM: 411  10/19/2018 Unknown        Chronic renal disease, stage 3, moderately decreased glomerular filtration rate between 30-59 mL/min/1.73 square meter (HCC) ICD-10-CM: N18.3  ICD-9-CM: 585.3  7/20/2018 Yes        * (Principal) Status asthmaticus with COPD (chronic obstructive pulmonary disease) ([facility] ICD-10-CM: J44.9, J45.902  ICD-9-CM: 493.21  4/20/2018 Yes        Hypertension ICD-10-CM: Z10  ICD-9-CM: 401.9  Unknown Yes                Admit to tele for obs       Status asthmatics with copd   Iv steroid, breathing tx, levaquin for empiric treatment, mucinex   Ct chest: Chronic dystrophic pleural-based masslike fluid collections with   heterogeneous complex internal attenuation suggesting sequela of a chronic   granulomatous process. Findings may represent a form of a chronic empyema   F/u with Dr. Brian Quevedo, will call pulm in the morning.  -ssi for glucose control   -test for flu     Asbestosis       HTN, accelerated  Continue home medication.     ckd3 stable. Stable at the baseline   Continue renal dose medication. Avoid nsaids           full code     DVT : heparin,  ppi proph  CC: sob /wheezing        HPI:     Denys Juarez is a 74 y.o. male who hx of copd, htn, asbestosis came to ER due to sob/wheezing worsening yesterday. He has been sob /wheezing and using neubulizer for self treatment, bit no improving. His wheezing/sob/cough worsening yesterday . Coughs with greenish sputum.  Ct chest Chronic dystrophic pleural-based masslike fluid collections with   heterogeneous complex internal attenuation suggesting sequela of a chronic  granulomatous process. 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 He received breathing treatment in ER, but sob and wheezing not improving much     Denies any slurred speech/headache/cp/n/v/blurred vission/d/c/palpitation/gait change/bleeding. Denies smoking/ any alcohol or drug use. He is former smoker       Visit Vitals  /90 (BP 1 Location: Left arm, BP Patient Position: At rest)   Pulse 87   Temp 98.3 °F (36.8 °C)   Resp 25   Ht 5' 8\" (1.727 m)   Wt 93 kg (205 lb)   SpO2 100%   BMI 31.17 kg/m²      O2 Device: Aerosol mask      Past Medical History:   Diagnosis Date    Cancer ([address]     prostate    COPD [facility]chronic obstructive [facility] disease) ([facility]     Hypertension     Pneumonia     Radiation effect        Past Surgical History:   Procedure Laterality Date    HX HERNIA REPAIR         Family History   Problem Relation Age of Onset    Heart Disease Mother        Social History     Socioeconomic History    Marital status:      Spouse name: Not on file    Number of children: Not on file    Years of education: Not on file    Highest education level: Not on file   Social Needs    Financial resource strain: Not on file    Food insecurity - worry: Not on file    Food insecurity - inability: Not on file   [employer] needs - medical: Not on file   [employer] needs - non-medical: Not on file   Occupational History    Not on file   Tobacco Use    Smoking status: Former Smoker     Types: Cigarettes    Smokeless tobacco: Never Used   Substance and Sexual Activity    Alcohol use: No    Drug use: Not on file    Sexual activity: Not on file   Other Topics Concern    Not on file   Social History Narrative    Not on file       Prior to Admission medications    Medication Sig Start Date End Date Taking? Authorizing Provider   acetaminophen (TYLENOL ARTHRITIS PAIN) 650 mg TbER Take 1 Tab by mouth every eight (8) hours.  9/8/18 Rina Corona PA-C   lidocaine (XYLOCAINE) 4 % topical cream Apply  to affected area three (3) times daily as needed for Pain. 9/8/18   Kelsey TREJO PA-C   predniSONE (STERAPRED) 5 mg dose pack See administration instruction per 5mg dose pack 7/23/18   Jeferson Ugalde MD   furosemide (LASIX) 20 mg tablet Take 20 mg by mouth daily. Blayne Bran MD   albuterol (PROVENTIL HFA, VENTOLIN HFA, PROAIR HFA) 90 mcg/actuation inhaler Take 2 Puffs by inhalation every four (4) hours as needed for Wheezing or Shortness of Breath. 4/23/18   Hubert Grace PA-C   ipratropium (ATROVENT) 0.03 % nasal spray 2 Sprays every twelve (12) hours. Blayne Bran MD   tamsulosin (FLOMAX) 0.4 mg capsule Take 0.4 mg by mouth daily. Blayne Bran MD   albuterol (PROVENTIL VENTOLIN) 2.5 mg /3 mL (0.083 %) nebulizer solution 3 mL by Nebulization route every four (4) hours as needed for Wheezing. 12/23/15   TRINA Glover   budesonide-formoterol (SYMBICORT) 160-4.5 mcg/actuation HFA inhaler Take 2 Puffs by inhalation two (2) times a day. 12/23/15   TRINA Glover   chlorthalidone (HYGROTEN) 25 mg tablet Take  by mouth daily. Blayne Bran MD   Nebulizer & Compressor machine Dispense: 1 nebulizer machine w all tubing necessary 10/6/14   Mabeline Deep, DO       Allergies   Allergen Reactions    Aspirin Other (comments)     \"messes my stomach up\"       Review of Systems  Gen: No fever, chills, malaise, weight loss/gain. Heent: No headache, rhinorrhea, epistaxis, ear pain, hearing loss, sinus pain, neck pain/stiffness, sore throat. Heart: No chest pain, palpitations, KUMAR, pnd, or orthopnea. Resp: cough with sputum, no hemoptysis, +wheezing and shortness of breath. GI: No nausea, vomiting, diarrhea, constipation, melena or hematochezia. : No urinary obstruction, dysuria or hematuria. Derm: No rash, new skin lesion or pruritis. Musc/skeletal: no bone or joint complains.   Vasc: No edema, cyanosis or claudication. Endo: No heat/cold intolerance, no polyuria,polydipsia or polyphagia. Neuro: No unilateral weakness, numbness, tingling. No seizures. Heme: No easy bruising or bleeding. Physical Exam:     Physical Exam:  Visit Vitals  /90 (BP 1 Location: Left arm, BP Patient Position: At rest)   Pulse 87   Temp 98.3 °F (36.8 °C)   Resp 25   Ht 5' 8\" (1.727 m)   Wt 93 kg (205 lb)   SpO2 100%   BMI 31.17 kg/m²      O2 Device: Aerosol mask    Temp (24hrs), Av.2 °F (36.8 °C), Min:98.1 °F (36.7 °C), Max:98.3 °F (36.8 °C)    No intake/output data recorded. No intake/output data recorded. General:  Awake, cooperative, no distress. Head:  Normocephalic, without obvious abnormality, atraumatic. Eyes:  Conjunctivae/corneas clear, sclera anicteric, PERRL, EOMs intact. Nose: Nares normal. No drainage or sinus tenderness. Throat: Lips, mucosa, and tongue normal. .   Neck: Supple, symmetrical, trachea midline, no adenopathy. Lungs:   Bilateral wheezing        Heart:  Regular rate and rhythm, S1, S2 normal, no murmur, click, rub or gallop. Abdomen: Soft, non-tender. Bowel sounds normal. No masses,  No organomegaly. Extremities: Extremities normal, atraumatic, no cyanosis or edema. Pulses: 2+ and symmetric all extremities. Skin: Skin color-pink, texture, turgor normal. No rashes or lesions. Capillary refill normal    Neurologic: CNII-XII intact. No focal motor or sensory deficit.        Labs Reviewed:    BMP:   Lab Results   Component Value Date/Time     (L) 10/19/2018 12:34 AM    K 3.7 10/19/2018 12:34 AM    CL 99 (L) 10/19/2018 12:34 AM    CO2 28 10/19/2018 12:34 AM    AGAP 7 10/19/2018 12:34 AM     (H) 10/19/2018 12:34 AM    BUN 19 (H) 10/19/2018 12:34 AM    CREA 1.44 (H) 10/19/2018 12:34 AM    GFRAA 58 (L) 10/19/2018 12:34 AM    GFRNA 48 (L) 10/19/2018 12:34 AM     CMP:   Lab Results   Component Value Date/Time     (L) 10/19/2018 12:34 AM    K 3.7 10/19/2018 12:34 AM    CL 99 (L) 10/19/2018 12:34 AM    CO2 28 10/19/2018 12:34 AM    AGAP 7 10/19/2018 12:34 AM     (H) 10/19/2018 12:34 AM    BUN 19 (H) 10/19/2018 12:34 AM    CREA 1.44 (H) 10/19/2018 12:34 AM    GFRAA 58 (L) 10/19/2018 12:34 AM    GFRNA 48 (L) 10/19/2018 12:34 AM    CA 8.9 10/19/2018 12:34 AM    ALB 3.0 (L) 10/19/2018 12:34 AM    TP 6.8 10/19/2018 12:34 AM    GLOB 3.8 10/19/2018 12:34 AM    AGRAT 0.8 10/19/2018 12:34 AM    SGOT 13 (L) 10/19/2018 12:34 AM    ALT 16 10/19/2018 12:34 AM     CBC:   Lab Results   Component Value Date/Time    WBC 10.4 10/19/2018 12:34 AM    HGB 11.5 (L) 10/19/2018 12:34 AM    HCT 34.8 (L) 10/19/2018 12:34 AM     10/19/2018 12:34 AM     All Cardiac Markers in the last 24 hours:   Lab Results   Component Value Date/Time     10/19/2018 12:34 AM    CKMB 4.6 (H) 10/19/2018 12:34 AM    CKND1 4.2 (H) 10/19/2018 12:34 AM    TROIQ <0.02 10/19/2018 12:34 AM     Recent Glucose Results:   Lab Results   Component Value Date/Time     (H) 10/19/2018 12:34 AM     ABG:   Lab Results   Component Value Date/Time    PHI 7.388 10/19/2018 02:15 AM    PCO2I 39.6 10/19/2018 02:15 AM    PO2I 83 10/19/2018 02:15 AM    HCO3I 23.8 10/19/2018 02:15 AM    FIO2I 0.21 10/19/2018 02:15 AM     COAGS: No results found for: APTT, PTP, INR  Liver Panel:   Lab Results   Component Value Date/Time    ALB 3.0 (L) 10/19/2018 12:34 AM    TP 6.8 10/19/2018 12:34 AM    GLOB 3.8 10/19/2018 12:34 AM    AGRAT 0.8 10/19/2018 12:34 AM    SGOT 13 (L) 10/19/2018 12:34 AM    ALT 16 10/19/2018 12:34 AM    AP 89 10/19/2018 12:34 AM     Pancreatic Markers: No results found for: AMYLPOCT, AML, LIPPOCT, LPSE    Procedures/imaging: see electronic medical records for all procedures/Xrays and details which were not copied into this note but were reviewed prior to creation of Clifkhang Cavazos MD, Internal Medicine     CC: Shant Solares MD

## 2018-10-19 NOTE — ROUTINE PROCESS
1919 Bedside verbal shift report received from [address], RN(offgoing nurse). Pt sitting up on side of bed denies pain and nad noted. Bed locked in lowest position. Call light and personal belongings in reach. Pt asked to use call light to call nursing staff for assistance before getting oob. Pt agrees.      9820 Report given to oncoming nurse

## 2018-10-19 NOTE — ED NOTES
Pt being admitted to room 339. Called report to Riana. Pt being admitted with the Levaquin IV infusing. Pt being taken to the unit via stretcher by this RN.

## 2018-10-19 NOTE — ED NOTES
Pt saying the neb treatments are being helpful with his breathing. RR 24-25. Improvement in the wheezing. Pt able to breathe more at ease. Pt being admitted to telemetry. Providing telemetry strip for dual sign off.

## 2018-10-19 NOTE — PROGRESS NOTES
PULMONARY CRITICAL CARE MEDICINE  ICU PROGRESS NOTE:  10/19/2018    Seen in follow up of dyspnea with cough. Interval History:  Exacerbation of underlying COPD; recurrent. Assessment:     Principal Problem:    Status asthmaticus with COPD (chronic obstructive pulmonary disease) ([facility] (4/20/2018)    Active Problems:    Hypertension ()      Chronic renal disease, stage 3, moderately decreased glomerular filtration rate between 30-59 mL/min/1.73 square meter ([address] (7/20/2018)      COPD exacerbation ([facility] (10/19/2018)      Asbestosis ([facility] (10/19/2018)    Asbestos related pleural disease    Plan:     PLANS FOR ABOVE PROBLEMS:  1.  Add/continue LABA/ICS   2. Out patient PFTs; flows, volumes, and DLCO  3. Chest CT without contrast  4. Albuterol PRN  5. Add Spiriva  6. MWT if able to ambulate prior to D/C      Activity  As tolerated    Disposition and Family   home    Subjective:   Cough and resting dyspnea. No CP. Scant sputum. Wheezes. No fever or chills. No orthopnea. No hemoptysis. States repeated admissions or visits to ED every two months. No current Tobacco use. No O2 use. Ex-Tobacco use. No DVT or PE. No CHF. No CAD. No TBC  No OSAS        Prostate Cancer Tx with XRT    Allergies ASA    Meds. 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 . Not sure if his Pneumovax is up to date. No influenza vaccine this year is recalled.     Review of Systems  See H&P  No syncope  No sore throat  No PND  No palpations'  No nausea or vomiting  No dysuria  No suicide ideation    Objective:     Visit Vitals  /79 (BP 1 Location: Right arm, BP Patient Position: At rest)   Pulse 92   Temp 98.1 °F (36.7 °C)   Resp 22   Ht 5' 8\" (1.727 m)   Wt 93 kg (205 lb)   SpO2 95%   BMI 31.17 kg/m²       PA- /; CVP- ; CI-   DRIPS:      Intake/Output Summary (Last 24 hours) at 10/19/2018 1253  Last data filed at 10/19/2018 0859  Gross per 24 hour Intake 360 ml   Output 625 ml   Net -265 ml       Physical Exam:  Awake, alert, NAD  Skin warm and dry, no cyanosis  Neck without stridor or JVD  Chest decreased BS bilaterally, scant wheezes exp. Heart RSR  Abdomen soft, BS active  Legs no gross edema  Speech coherent    Ventilator Settings:   Reviewed Ventilator Flowsheet:    PAP:        Wedge:    CVP:        CO:          CI:            SVR:        PVR:                  Prophylactic Measures:  Vt/IBW =   Glucose:     DVT: Drug - Compression Stockings  SUP: Yes  HOB > 30 degrees: Yes  Spontaneous Awakening Trial:   Spontaneous Breathing Trial:   Nutrition: Yes    Data Review:   Reviewed available LABS since my last time stamp. Recent Results (from the past 24 hour(s))   EKG, 12 LEAD, INITIAL    Collection Time: 10/19/18 12:28 AM   Result Value Ref Range    Ventricular Rate 97 BPM    Atrial Rate 97 BPM    P-R Interval 156 ms    QRS Duration 84 ms    Q-T Interval 362 ms    QTC Calculation (Bezet) 459 ms    Calculated P Axis 70 degrees    Calculated R Axis 26 degrees    Calculated T Axis 66 degrees    Diagnosis       Sinus rhythm with premature atrial complexes with aberrant conduction  Otherwise normal ECG  When compared with ECG of 08-SEP-2018 10:00,  premature ventricular complexes are no longer present  aberrant conduction is now present     CBC WITH AUTOMATED DIFF    Collection Time: 10/19/18 12:34 AM   Result Value Ref Range    WBC 10.4 4.6 - 13.2 K/uL    RBC 3.92 (L) 4.70 - 5.50 M/uL    HGB 11.5 (L) 13.0 - 16.0 g/dL    HCT 34.8 (L) 36.0 - 48.0 %    MCV 88.8 74.0 - 97.0 FL    MCH 29.3 24.0 - 34.0 PG    MCHC 33.0 31.0 - 37.0 g/dL    RDW 13.7 11.6 - 14.5 %    PLATELET 287 617 - 893 K/uL    MPV 8.5 (L) 9.2 - 11.8 FL    NEUTROPHILS 66 42 - 75 %    BAND NEUTROPHILS 5 0 - 5 %    LYMPHOCYTES 13 (L) 20 - 51 %    MONOCYTES 2 2 - 9 %    EOSINOPHILS 14 (H) 0 - 5 %    BASOPHILS 0 0 - 3 %    ABS. NEUTROPHILS 6.9 1.8 - 8.0 K/UL    ABS.  LYMPHOCYTES 1.4 0.8 - 3.5 K/UL ABS. MONOCYTES 0.2 0 - 1.0 K/UL    ABS. EOSINOPHILS 1.5 (H) 0.0 - 0.4 K/UL    ABS. BASOPHILS 0.0 0.0 - 0.1 K/UL    RBC COMMENTS NORMOCYTIC, NORMOCHROMIC      DF MANUAL     CARDIAC PANEL,(CK, CKMB & TROPONIN)    Collection Time: 10/19/18 12:34 AM   Result Value Ref Range     39 - 308 U/L    CK - MB 4.6 (H) <3.6 ng/ml    CK-MB Index 4.2 (H) 0.0 - 4.0 %    Troponin-I, Qt. <0.02 0.00 - 1.23 NG/ML   METABOLIC PANEL, COMPREHENSIVE    Collection Time: 10/19/18 12:34 AM   Result Value Ref Range    Sodium 134 (L) 136 - 145 mmol/L    Potassium 3.7 3.5 - 5.5 mmol/L    Chloride 99 (L) 100 - 108 mmol/L    CO2 28 21 - 32 mmol/L    Anion gap 7 3.0 - 18 mmol/L    Glucose 122 (H) 74 - 99 mg/dL    BUN 19 (H) 7.0 - 18 MG/DL    Creatinine 1.44 (H) 0.6 - 1.3 MG/DL    BUN/Creatinine ratio 13 12 - 20      GFR est AA 58 (L) >60 ml/min/1.73m2    GFR est non-AA 48 (L) >60 ml/min/1.73m2    Calcium 8.9 8.5 - 10.1 MG/DL    Bilirubin, total 0.3 0.2 - 1.0 MG/DL    ALT (SGPT) 16 16 - 61 U/L    AST (SGOT) 13 (L) 15 - 37 U/L    Alk. phosphatase 89 45 - 117 U/L    Protein, total 6.8 6.4 - 8.2 g/dL    Albumin 3.0 (L) 3.4 - 5.0 g/dL    Globulin 3.8 2.0 - 4.0 g/dL    A-G Ratio 0.8 0.8 - 1.7     NT-PRO BNP    Collection Time: 10/19/18 12:34 AM   Result Value Ref Range    NT pro-BNP 47 0 - 1,800 PG/ML   HEMOGLOBIN A1C WITH EAG    Collection Time: 10/19/18 12:34 AM   Result Value Ref Range    Hemoglobin A1c 5.9 (H) 4.5 - 5.6 %    Est. average glucose 123 mg/dL   POC G3    Collection Time: 10/19/18  2:15 AM   Result Value Ref Range    Device: ROOM AIR      FIO2 (POC) 0.21 %    pH (POC) 7.388 7.35 - 7.45      pCO2 (POC) 39.6 35.0 - 45.0 MMHG    pO2 (POC) 83 80 - 100 MMHG    HCO3 (POC) 23.8 22 - 26 MMOL/L    sO2 (POC) 96 92 - 97 %    Base deficit (POC) 1 mmol/L    Allens test (POC) YES      Total resp. rate 20      Site RIGHT RADIAL      Patient temp.  98.7      Specimen type (POC) ARTERIAL      Performed by Alberto 42 (RAPID TEST)    Collection Time: 10/19/18  6:20 AM   Result Value Ref Range    Influenza A Antigen NEGATIVE  NEG      Influenza B Antigen NEGATIVE  NEG     GLUCOSE, POC    Collection Time: 10/19/18  7:22 AM   Result Value Ref Range    Glucose (POC) 146 (H) 70 - 110 mg/dL   GLUCOSE, POC    Collection Time: 10/19/18 11:50 AM   Result Value Ref Range    Glucose (POC) 138 (H) 70 - 110 mg/dL         Microbiology:      Radiology:   Xr Chest Port    Result Date: 10/19/2018  A portable AP radiograph of the chest was obtained at 0104 hours: INDICATION:  Shortness of breath. COMPARISON:  Multiple prior studies most recent being 9/8/2018. FINDINGS:  Heart and mediastinum: Unremarkable. Lungs and pleura: Coarse bronchovascular markings are seen with calcified pleural plaques in the right lung similar to the prior study. No new consolidation or pleural effusion is identified. Aorta: Unremarkable. Bones: Within normal limits for age. Other: None. Impression: Chronic changes as described stable since 9/8/2018 without acute process. Pleural calcifications may be related to asbestos exposure. Stable.     Dulce Miller MD [address] [facility]  [phone]

## 2018-10-19 NOTE — ED TRIAGE NOTES
Pt brought to ED by EMS c/c SOB with exertion. PT reports SOB started 2 days ago and worsened tonight. Pt reports using rescue inhaler at home as well as a neb treatment. PT was given duoneb and 125mg Solumedrol IVP en route to ED. PT does state some relief.

## 2018-10-19 NOTE — PROGRESS NOTES
Patient seen this AM during rounds. Case discussed with Dr Jalen Valdes, admitting physician. Pulm consulted for CT findings suggestive of chronic empyema. Continue current plan, will remain available throughout the day for questions/concerns.

## 2018-10-19 NOTE — PROGRESS NOTES
Consult given to Dr. Pancho Richter (pulmonary) per request of Dr. Waldemar Su. MD to see patient this afternoon.

## 2018-10-19 NOTE — ED NOTES
Pt transported to room 337. Riana TUCKER assumed care of pt. Dual skin completed. Tele strip from ED provided. Christina RODRIGUES plans to sign with Riana on Tele strip. NAD observed of pt upon admission.

## 2018-10-19 NOTE — ED PROVIDER NOTES
EMERGENCY DEPARTMENT HISTORY AND PHYSICAL EXAM    Date: 10/19/2018  Patient Name: Marni Harp    History of Presenting Illness     Chief Complaint   Patient presents with    Shortness of Breath    Cough         History Provided By: Patient    Chief Complaint: SOB  Duration: A few weeks   Timing:  Progressive and Worsening  Location: Lungs  Associated Symptoms: productive cough with green sputum and diaphoresis    Additional History (Context):   1:30 AM  Marni Harp is a 74 y.o. male with PMHx of COPD who presents via EMS to the emergency department C/O SOB, onset a few weeks ago, worsened tonight. Associated sxs include productive cough with green sputum and diaphoresis. Pt has never been intubated but has carol ann admitted due to breathing difficulties. Pt has chronic BLE swelling, at baseline. Pt does not use O2 at home. Pt quit tobacco use a few months ago. Has FHx of heart issues in mother. Pt denies CP, N/D, LOC, sick/ill contact, SHx EtOH use, or any other sxs or complaints. PCP: Eun Diaz MD    Current Facility-Administered Medications   Medication Dose Route Frequency Provider Last Rate Last Dose    sodium chloride (NS) flush 5-10 mL  5-10 mL IntraVENous PRN Jose Elias Knight MD         Current Outpatient Medications   Medication Sig Dispense Refill    acetaminophen (TYLENOL ARTHRITIS PAIN) 650 mg TbER Take 1 Tab by mouth every eight (8) hours. 15 Tab 0    lidocaine (XYLOCAINE) 4 % topical cream Apply  to affected area three (3) times daily as needed for Pain. 15 g 0    predniSONE (STERAPRED) 5 mg dose pack See administration instruction per 5mg dose pack 21 Tab 0    furosemide (LASIX) 20 mg tablet Take 20 mg by mouth daily.  albuterol (PROVENTIL HFA, VENTOLIN HFA, PROAIR HFA) 90 mcg/actuation inhaler Take 2 Puffs by inhalation every four (4) hours as needed for Wheezing or Shortness of Breath.  1 Inhaler 1    ipratropium (ATROVENT) 0.03 % nasal spray 2 Sprays every twelve (12) hours.      tamsulosin (FLOMAX) 0.4 mg capsule Take 0.4 mg by mouth daily.  albuterol (PROVENTIL VENTOLIN) 2.5 mg /3 mL (0.083 %) nebulizer solution 3 mL by Nebulization route every four (4) hours as needed for Wheezing. 24 Each 0    budesonide-formoterol (SYMBICORT) 160-4.5 mcg/actuation HFA inhaler Take 2 Puffs by inhalation two (2) times a day. 1 Inhaler 0    chlorthalidone (HYGROTEN) 25 mg tablet Take  by mouth daily.  Nebulizer & Compressor machine Dispense: 1 nebulizer machine w all tubing necessary 1 each 0       Past History     Past Medical History:  Past Medical History:   Diagnosis Date    Cancer ([facility] [address]     prostate    COPD (chronic obstructive pulmonary disease) ([facility] [facility]     Hypertension     Pneumonia     Radiation effect        Past Surgical History:  Past Surgical History:   Procedure Laterality Date    HX HERNIA REPAIR         Family History:  Family History   Problem Relation Age of Onset    Heart Disease Mother        Social History:  Social History     Tobacco Use    Smoking status: Former Smoker     Types: Cigarettes    Smokeless tobacco: Never Used   Substance Use Topics    Alcohol use: No    Drug use: Not on file       Allergies: Allergies   Allergen Reactions    Aspirin Other (comments)     \"messes my stomach up\"         Review of Systems   Review of Systems   Constitutional: Positive for diaphoresis. Negative for chills, fever and unexpected weight change. HENT: Negative for congestion, drooling, ear pain, rhinorrhea, sore throat, tinnitus and trouble swallowing. Eyes: Negative for photophobia, pain, redness and visual disturbance. Respiratory: Positive for cough (productive) and shortness of breath. Negative for choking, chest tightness, wheezing and stridor. Cardiovascular: Negative for chest pain, palpitations and leg swelling.    Gastrointestinal: Negative for abdominal distention, abdominal pain, anal bleeding, blood in stool, constipation, diarrhea, nausea and vomiting. Genitourinary: Negative for difficulty urinating, dysuria, flank pain, frequency, hematuria and urgency. Musculoskeletal: Negative for arthralgias, back pain and neck pain. Skin: Negative for color change, rash and wound. Neurological: Negative for dizziness, seizures, syncope, speech difficulty, light-headedness and headaches. Hematological: Does not bruise/bleed easily. Psychiatric/Behavioral: Negative for agitation, behavioral problems, hallucinations, self-injury and suicidal ideas. The patient is not hyperactive. Physical Exam     Vitals:    10/19/18 0330 10/19/18 0345 10/19/18 0354 10/19/18 0406   BP: 155/72 158/78  138/88   Pulse:    81   Resp:    20   Temp:    98.2 °F (36.8 °C)   SpO2: 95% 97% 97% 97%   Weight:       Height:         Physical Exam   Constitutional: He is oriented to person, place, and time. He appears well-developed and well-nourished. Elderly male, uncomfortable appearing   HENT:   Head: Normocephalic and atraumatic. Right Ear: External ear normal.   Left Ear: External ear normal.   Mouth/Throat: Oropharynx is clear and moist. No oropharyngeal exudate. Eyes: EOM are normal. Pupils are equal, round, and reactive to light. Right conjunctiva is injected. Left conjunctiva is injected. No scleral icterus. No pallor   Neck: Normal range of motion. Neck supple. No JVD present. No tracheal deviation present. No thyromegaly present. Cardiovascular: Regular rhythm and normal heart sounds. Tachycardia present. Borderline tachycardic   Pulmonary/Chest: Accessory muscle usage present. No stridor. No respiratory distress. He has wheezes. inspiratory and expiratory wheezing to all lung fields, repeatedly using accessory muscles   Abdominal: Soft. Bowel sounds are normal. He exhibits no distension. There is no tenderness. There is no rebound and no guarding. Musculoskeletal: Normal range of motion. He exhibits no edema or tenderness.    No soft tissue injuries Lymphadenopathy:     He has no cervical adenopathy. Neurological: He is alert and oriented to person, place, and time. He has normal reflexes. He displays tremor. No cranial nerve deficit. Coordination normal.   Mildly tremulous   Skin: Skin is warm and dry. No rash noted. He is not diaphoretic. No erythema. Decreased turgor   Psychiatric: He has a normal mood and affect. His behavior is normal. Judgment and thought content normal.   Nursing note and vitals reviewed. Diagnostic Study Results     Labs -     Recent Results (from the past 12 hour(s))   CBC WITH AUTOMATED DIFF    Collection Time: 10/19/18 12:34 AM   Result Value Ref Range    WBC 10.4 4.6 - 13.2 K/uL    RBC 3.92 (L) 4.70 - 5.50 M/uL    HGB 11.5 (L) 13.0 - 16.0 g/dL    HCT 34.8 (L) 36.0 - 48.0 %    MCV 88.8 74.0 - 97.0 FL    MCH 29.3 24.0 - 34.0 PG    MCHC 33.0 31.0 - 37.0 g/dL    RDW 13.7 11.6 - 14.5 %    PLATELET 638 534 - 962 K/uL    MPV 8.5 (L) 9.2 - 11.8 FL    NEUTROPHILS 66 42 - 75 %    BAND NEUTROPHILS 5 0 - 5 %    LYMPHOCYTES 13 (L) 20 - 51 %    MONOCYTES 2 2 - 9 %    EOSINOPHILS 14 (H) 0 - 5 %    BASOPHILS 0 0 - 3 %    ABS. NEUTROPHILS 6.9 1.8 - 8.0 K/UL    ABS. LYMPHOCYTES 1.4 0.8 - 3.5 K/UL    ABS. MONOCYTES 0.2 0 - 1.0 K/UL    ABS. EOSINOPHILS 1.5 (H) 0.0 - 0.4 K/UL    ABS.  BASOPHILS 0.0 0.0 - 0.1 K/UL    RBC COMMENTS NORMOCYTIC, NORMOCHROMIC      DF MANUAL     CARDIAC PANEL,(CK, CKMB & TROPONIN)    Collection Time: 10/19/18 12:34 AM   Result Value Ref Range     39 - 308 U/L    CK - MB 4.6 (H) <3.6 ng/ml    CK-MB Index 4.2 (H) 0.0 - 4.0 %    Troponin-I, Qt. <0.02 0.00 - 1.82 NG/ML   METABOLIC PANEL, COMPREHENSIVE    Collection Time: 10/19/18 12:34 AM   Result Value Ref Range    Sodium 134 (L) 136 - 145 mmol/L    Potassium 3.7 3.5 - 5.5 mmol/L    Chloride 99 (L) 100 - 108 mmol/L    CO2 28 21 - 32 mmol/L    Anion gap 7 3.0 - 18 mmol/L    Glucose 122 (H) 74 - 99 mg/dL    BUN 19 (H) 7.0 - 18 MG/DL    Creatinine 1.44 (H) 0.6 - 1.3 MG/DL    BUN/Creatinine ratio 13 12 - 20      GFR est AA 58 (L) >60 ml/min/1.73m2    GFR est non-AA 48 (L) >60 ml/min/1.73m2    Calcium 8.9 8.5 - 10.1 MG/DL    Bilirubin, total 0.3 0.2 - 1.0 MG/DL    ALT (SGPT) 16 16 - 61 U/L    AST (SGOT) 13 (L) 15 - 37 U/L    Alk. phosphatase 89 45 - 117 U/L    Protein, total 6.8 6.4 - 8.2 g/dL    Albumin 3.0 (L) 3.4 - 5.0 g/dL    Globulin 3.8 2.0 - 4.0 g/dL    A-G Ratio 0.8 0.8 - 1.7     NT-PRO BNP    Collection Time: 10/19/18 12:34 AM   Result Value Ref Range    NT pro-BNP 47 0 - 1,800 PG/ML   POC G3    Collection Time: 10/19/18  2:15 AM   Result Value Ref Range    Device: ROOM AIR      FIO2 (POC) 0.21 %    pH (POC) 7.388 7.35 - 7.45      pCO2 (POC) 39.6 35.0 - 45.0 MMHG    pO2 (POC) 83 80 - 100 MMHG    HCO3 (POC) 23.8 22 - 26 MMOL/L    sO2 (POC) 96 92 - 97 %    Base deficit (POC) 1 mmol/L    Allens test (POC) YES      Total resp. rate 20      Site RIGHT RADIAL      Patient temp. 98.7      Specimen type (POC) ARTERIAL      Performed by Reza Presley        Radiologic Studies -    XR CHEST PORT    (Results Pending)     3:08 AM  RADIOLOGY FINDINGS  Chest X-ray shows interstitial pattern with cephalization  Pending review by Radiologist  Recorded by Jacey Gutierrez ED Scribe, as dictated by Raheem Tamayo MD    CT Results  (Last 48 hours)    None        CXR Results  (Last 48 hours)    None            Medical Decision Making   I am the first provider for this patient. I reviewed the vital signs, available nursing notes, past medical history, past surgical history, family history and social history. Vital Signs-Reviewed the patient's vital signs. Pulse Oximetry Analysis - 100% on RA     EKG interpretation: (Preliminary)  12:28 AM  NSR at Sinus rhythm with PAC's with aberrant conduction. 97 bpm. Normal ST segments. EKG read by Raheem Gibbs Class, MD at 12:28 AM     Records Reviewed: Nursing Notes and Old Medical Records    Provider Notes (Medical Decision Making):   Ddx: Asbestosis, COPD, PNA, effusion, PE, cardiac disease, aspiration    Procedures:  Procedures    MEDICATIONS GIVEN:  Medications   sodium chloride (NS) flush 5-10 mL (not administered)       ED Course:   1:30 AM   Initial assessment performed. The patients presenting problems have been discussed, and they are in agreement with the care plan formulated and outlined with them. I have encouraged them to ask questions as they arise throughout their visit. 2:45 AM   Not improved. 3:15 AM   Moving air better, speaking full sentences, appears to be sleeping but with accessory muscle use and persistent wheezing. 4:06 AM Discussed patient's history, exam, and available diagnostics results with Kory Marquez MD, internal medicine, who agrees to admit to tele obs. Diagnosis and Disposition     Critical Care Time:   4:18 AM  I have spent 75 minutes of critical care time involved in lab review, consultations with specialist, family decision-making, and documentation. During this entire length of time I was immediately available to the patient. Critical Care: The reason for providing this level of medical care for this critically ill patient was due a critical illness that impaired one or more vital organ systems such that there was a high probability of imminent or life threatening deterioration in the patients condition. This care involved high complexity decision making to assess, manipulate, and support vital system functions, to treat this degree vital organ system failure and to prevent further life threatening deterioration of the patients condition. Core Measures:  For Hospitalized Patients:    1. Hospitalization Decision Time:  The decision to hospitalize the patient was made by Derrick Asher. Bernie Tamayo MD at 4:06 AM on 10/19/2018    2.  Aspirin: Aspirin was not given because the patient did not present with a stroke at the time of their Emergency Department evaluation    4:06 AM  Patient is being admitted to the \[facility]\"" by Aditya Marcano MD. The results of their tests and reasons for their admission have been discussed with them and/or available family. They convey agreement and understanding for the need to be admitted and for their admission diagnosis. CONDITIONS ON ADMISSION:  Sepsis is not present at the time of admission. Deep Vein Thrombosis is not present at the time of admission. Thrombosis is not present at the time of admission. Urinary Tract Infection is not present at the time of admission. Pneumonia is not present at the time of admission. MRSA is not present at the time of admission. Wound infection is not present at the time of admission. Pressure Ulcer is not present at the time of admission. CLINICAL IMPRESSION:    1. COPD with asthma and status asthmaticus ([facility] [address]        PLAN:  1. Admit to tele obs  _______________________________    Attestations: This note is prepared by Barney Keenan, acting as Scribe for Jayme. Selma Chambers MD.    [employer]. Selma Chambers MD:  The scribe's documentation has been prepared under my direction and personally reviewed by me in its entirety.   I confirm that the note above accurately reflects all work, treatment, procedures, and medical decision making performed by me.  _______________________________

## 2018-10-19 NOTE — PROGRESS NOTES
TRANSFER - IN REPORT:    Verbal report received from Ryan Brewer RN (name) on [facility]  being received from ED (unit) for routine progression of care      Report consisted of patients Situation, Background, Assessment and   Recommendations(SBAR). Information from the following report(s) SBAR, Kardex and MAR was reviewed with the receiving nurse. Opportunity for questions and clarification was provided. Assessment completed upon patients arrival to unit and care assumed.

## 2018-10-19 NOTE — PROGRESS NOTES
Chart reviewed. Pt admitted for status asthmaticus w COPD. CM will follow for discharge planning needs. 1010  Met with pt at bedside. No family present. Pt lives with his wife. Pts home address confirmed per face sheet. Pt states either he or his wife will drive to [place]. Pt sees PCP [place]. Pts sees [facility] R[facility] Bennett for pulmonology. Pt states he has a follow up appt with pulmonology for 11/2 and 11/8. Pt has a cane and home neb machine. Pt doesn't voice any discharge concerns. Pts primary RN states pt mentioned difficulty affording inhalers. Discussed possibility of home health and pt states he does not feel like he needs it. CM will cont to follow. Reason for Admission:  Per H&P, pt is a 74 y.o. male who hx of copd, htn, asbestosis came to ER due to sob/wheezing worsening yesterday. He has been sob /wheezing and using neubulizer for self treatment, bit no improving. His wheezing/sob/cough worsening yesterday . Coughs with greenish sputum. Ct chest Chronic dystrophic pleural-based masslike fluid collections with   heterogeneous complex internal attenuation suggesting sequela of a chronic  granulomatous process. 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 He received breathing treatment in ER, but sob and wheezing not improving much     RRAT Score:    23 high              Resources/supports as identified by patient/family:                   Top Challenges facing patient (as identified by patient/family and CM): Finances/Medication cost?      Yes, see note              Transportation?  n/a              Support system or lack thereof? Has wife                     Living arrangements? wife           Self-care/ADLs/Cognition? Independent w cane          Current Advanced Directive/Advance Care Plan:   On file                          Plan for utilizing home health:    TBD                      Likelihood of readmission: HIGH                 Transition of Care Plan:   TBD Care Management Interventions  PCP Verified by CM: Yes  Mode of Transport at Discharge:  Other (see comment)(wife)  Transition of Care Consult (CM Consult): Discharge Planning  Current Support Network: Lives with Spouse  Confirm Follow Up Transport: Self  Plan discussed with Pt/Family/Caregiver: Yes  Discharge Location  Discharge Placement: Home with family assistance

## 2018-10-20 ENCOUNTER — APPOINTMENT (OUTPATIENT)
Dept: CT IMAGING | Age: 73
DRG: 191 | End: 2018-10-20
Attending: INTERNAL MEDICINE
Payer: MEDICARE

## 2018-10-20 LAB
ANION GAP SERPL CALC-SCNC: 11 MMOL/L (ref 3–18)
BASOPHILS # BLD: 0 K/UL (ref 0–0.1)
BASOPHILS NFR BLD: 0 % (ref 0–3)
BUN SERPL-MCNC: 23 MG/DL (ref 7–18)
BUN/CREAT SERPL: 17 (ref 12–20)
CALCIUM SERPL-MCNC: 9 MG/DL (ref 8.5–10.1)
CHLORIDE SERPL-SCNC: 96 MMOL/L (ref 100–108)
CO2 SERPL-SCNC: 24 MMOL/L (ref 21–32)
CREAT SERPL-MCNC: 1.36 MG/DL (ref 0.6–1.3)
DIFFERENTIAL METHOD BLD: ABNORMAL
EOSINOPHIL # BLD: 0 K/UL (ref 0–0.4)
EOSINOPHIL NFR BLD: 0 % (ref 0–5)
ERYTHROCYTE [DISTWIDTH] IN BLOOD BY AUTOMATED COUNT: 13.3 % (ref 11.6–14.5)
GLUCOSE BLD STRIP.AUTO-MCNC: 142 MG/DL (ref 70–110)
GLUCOSE BLD STRIP.AUTO-MCNC: 145 MG/DL (ref 70–110)
GLUCOSE BLD STRIP.AUTO-MCNC: 146 MG/DL (ref 70–110)
GLUCOSE BLD STRIP.AUTO-MCNC: 154 MG/DL (ref 70–110)
GLUCOSE SERPL-MCNC: 150 MG/DL (ref 74–99)
HCT VFR BLD AUTO: 33.6 % (ref 36–48)
HGB BLD-MCNC: 11.3 G/DL (ref 13–16)
LYMPHOCYTES # BLD: 0.6 K/UL (ref 0.8–3.5)
LYMPHOCYTES NFR BLD: 5 % (ref 20–51)
MAGNESIUM SERPL-MCNC: 1.8 MG/DL (ref 1.6–2.6)
MCH RBC QN AUTO: 29.3 PG (ref 24–34)
MCHC RBC AUTO-ENTMCNC: 33.6 G/DL (ref 31–37)
MCV RBC AUTO: 87 FL (ref 74–97)
MONOCYTES # BLD: 0.1 K/UL (ref 0–1)
MONOCYTES NFR BLD: 1 % (ref 2–9)
NEUTS BAND NFR BLD MANUAL: 6 % (ref 0–5)
NEUTS SEG # BLD: 10 K/UL (ref 1.8–8)
NEUTS SEG NFR BLD: 88 % (ref 42–75)
PLATELET # BLD AUTO: 295 K/UL (ref 135–420)
PMV BLD AUTO: 8.7 FL (ref 9.2–11.8)
POTASSIUM SERPL-SCNC: 4.2 MMOL/L (ref 3.5–5.5)
RBC # BLD AUTO: 3.86 M/UL (ref 4.7–5.5)
RBC MORPH BLD: ABNORMAL
SODIUM SERPL-SCNC: 131 MMOL/L (ref 136–145)
WBC # BLD AUTO: 11.4 K/UL (ref 4.6–13.2)

## 2018-10-20 PROCEDURE — 97161 PT EVAL LOW COMPLEX 20 MIN: CPT

## 2018-10-20 PROCEDURE — 99218 HC RM OBSERVATION: CPT

## 2018-10-20 PROCEDURE — 97116 GAIT TRAINING THERAPY: CPT

## 2018-10-20 PROCEDURE — 94760 N-INVAS EAR/PLS OXIMETRY 1: CPT

## 2018-10-20 PROCEDURE — 74011250636 HC RX REV CODE- 250/636: Performed by: HOSPITALIST

## 2018-10-20 PROCEDURE — 74011250637 HC RX REV CODE- 250/637: Performed by: INTERNAL MEDICINE

## 2018-10-20 PROCEDURE — 77030027138 HC INCENT SPIROMETER -A

## 2018-10-20 PROCEDURE — 94640 AIRWAY INHALATION TREATMENT: CPT

## 2018-10-20 PROCEDURE — 74011636637 HC RX REV CODE- 636/637: Performed by: HOSPITALIST

## 2018-10-20 PROCEDURE — 36415 COLL VENOUS BLD VENIPUNCTURE: CPT | Performed by: HOSPITALIST

## 2018-10-20 PROCEDURE — 74011250637 HC RX REV CODE- 250/637: Performed by: HOSPITALIST

## 2018-10-20 PROCEDURE — 71250 CT THORAX DX C-: CPT

## 2018-10-20 PROCEDURE — 74011000250 HC RX REV CODE- 250: Performed by: HOSPITALIST

## 2018-10-20 PROCEDURE — 85025 COMPLETE CBC W/AUTO DIFF WBC: CPT | Performed by: HOSPITALIST

## 2018-10-20 PROCEDURE — 77010033678 HC OXYGEN DAILY

## 2018-10-20 PROCEDURE — 80048 BASIC METABOLIC PNL TOTAL CA: CPT | Performed by: HOSPITALIST

## 2018-10-20 PROCEDURE — 82962 GLUCOSE BLOOD TEST: CPT

## 2018-10-20 PROCEDURE — 83735 ASSAY OF MAGNESIUM: CPT | Performed by: HOSPITALIST

## 2018-10-20 RX ADMIN — BUDESONIDE 500 MCG: 0.5 INHALANT RESPIRATORY (INHALATION) at 07:29

## 2018-10-20 RX ADMIN — ARFORMOTEROL TARTRATE 15 MCG: 15 SOLUTION RESPIRATORY (INHALATION) at 20:37

## 2018-10-20 RX ADMIN — INSULIN LISPRO 2 UNITS: 100 INJECTION, SOLUTION INTRAVENOUS; SUBCUTANEOUS at 22:34

## 2018-10-20 RX ADMIN — HEPARIN SODIUM 5000 UNITS: 5000 INJECTION INTRAVENOUS; SUBCUTANEOUS at 22:34

## 2018-10-20 RX ADMIN — LEVOFLOXACIN 500 MG: 5 INJECTION, SOLUTION INTRAVENOUS at 05:09

## 2018-10-20 RX ADMIN — METHYLPREDNISOLONE SODIUM SUCCINATE 60 MG: 125 INJECTION, POWDER, FOR SOLUTION INTRAMUSCULAR; INTRAVENOUS at 12:35

## 2018-10-20 RX ADMIN — GUAIFENESIN 600 MG: 600 TABLET, EXTENDED RELEASE ORAL at 09:08

## 2018-10-20 RX ADMIN — FAMOTIDINE 20 MG: 20 TABLET ORAL at 22:34

## 2018-10-20 RX ADMIN — CHLORTHALIDONE 25 MG: 25 TABLET ORAL at 09:08

## 2018-10-20 RX ADMIN — IPRATROPIUM BROMIDE AND ALBUTEROL SULFATE 3 ML: .5; 3 SOLUTION RESPIRATORY (INHALATION) at 23:58

## 2018-10-20 RX ADMIN — IPRATROPIUM BROMIDE AND ALBUTEROL SULFATE 3 ML: .5; 3 SOLUTION RESPIRATORY (INHALATION) at 15:43

## 2018-10-20 RX ADMIN — METHYLPREDNISOLONE SODIUM SUCCINATE 60 MG: 125 INJECTION, POWDER, FOR SOLUTION INTRAMUSCULAR; INTRAVENOUS at 05:09

## 2018-10-20 RX ADMIN — ALBUTEROL SULFATE 2.5 MG: 2.5 SOLUTION RESPIRATORY (INHALATION) at 06:27

## 2018-10-20 RX ADMIN — METHYLPREDNISOLONE SODIUM SUCCINATE 60 MG: 125 INJECTION, POWDER, FOR SOLUTION INTRAMUSCULAR; INTRAVENOUS at 18:59

## 2018-10-20 RX ADMIN — FAMOTIDINE 20 MG: 20 TABLET ORAL at 09:08

## 2018-10-20 RX ADMIN — FLUTICASONE FUROATE 1 PUFF: 100 POWDER RESPIRATORY (INHALATION) at 12:46

## 2018-10-20 RX ADMIN — HEPARIN SODIUM 5000 UNITS: 5000 INJECTION INTRAVENOUS; SUBCUTANEOUS at 15:24

## 2018-10-20 RX ADMIN — BUDESONIDE 500 MCG: 0.5 INHALANT RESPIRATORY (INHALATION) at 20:37

## 2018-10-20 RX ADMIN — IPRATROPIUM BROMIDE AND ALBUTEROL SULFATE 3 ML: .5; 3 SOLUTION RESPIRATORY (INHALATION) at 20:37

## 2018-10-20 RX ADMIN — GUAIFENESIN 600 MG: 600 TABLET, EXTENDED RELEASE ORAL at 22:34

## 2018-10-20 RX ADMIN — ARFORMOTEROL TARTRATE 15 MCG: 15 SOLUTION RESPIRATORY (INHALATION) at 07:29

## 2018-10-20 RX ADMIN — IPRATROPIUM BROMIDE AND ALBUTEROL SULFATE 3 ML: .5; 3 SOLUTION RESPIRATORY (INHALATION) at 11:11

## 2018-10-20 RX ADMIN — HEPARIN SODIUM 5000 UNITS: 5000 INJECTION INTRAVENOUS; SUBCUTANEOUS at 06:27

## 2018-10-20 RX ADMIN — TAMSULOSIN HYDROCHLORIDE 0.4 MG: 0.4 CAPSULE ORAL at 09:08

## 2018-10-20 RX ADMIN — IPRATROPIUM BROMIDE AND ALBUTEROL SULFATE 3 ML: .5; 3 SOLUTION RESPIRATORY (INHALATION) at 07:29

## 2018-10-20 RX ADMIN — IPRATROPIUM BROMIDE AND ALBUTEROL SULFATE 3 ML: .5; 3 SOLUTION RESPIRATORY (INHALATION) at 03:59

## 2018-10-20 NOTE — PROGRESS NOTES
Problem: Mobility Impaired (Adult and Pediatric)  Goal: *Acute Goals and Plan of Care (Insert Text)  Physical Therapy Goals   Initiated 10/20/2018 and to be accomplished within 3-5 day(s)  1. Patient will move from supine <> sit with S in prep for out of bed activity and change of position. 2.  Patient will perform sit<> stand with S with LRAD in prep for transfers/ambulation. 3.  Patient will transfer from bed <> chair with S with LRAD for time up in chair for completion of ADL activity. 4.  Patient will ambulate 150 feet with LRAD/S for improved functional mobility/safe discharge. 5.  Patient will ascend/descend 3-5 stairs with handrail with contact guard assist for home re-entry as needed. Outcome: Progressing Towards Goal  physical Therapy EVALUATION    Patient: Fred Quezada (18 y.o. male)  Date: 10/20/2018  Primary Diagnosis: COPD exacerbation ([address]  Asbestosis ([facility] [address]       Precautions:  Fall    ASSESSMENT :  Based on the objective data described below, the patient presents with decrease independence w/ bed mobility, transfers, gait, and step negotiation. Pt seen in supine prior to session w/ supplemental O2 and telemonitor donned. Pt reported no pain at this time. Pt is [place] during session despite hearing aids in place. Pt reports PTA that he uses a QC for mobility. Pt has no hx of falls in the last year. Pt a little hesitant to participate in session as pt reports he has been unsteady and SOB. Pt's O2 levels were assessed prior to mobility at 96 on supplemental O2. Pt able to ambulate 35 ft w/ RW/GB w/o any difficulty. Pt reports feeling much steadier w/ RW. Pt transferred back room where pt was left sitting in a chair after session. Spirometer given to pt and educated on its use as pt c/o SOB during session. Pt's o2 levels assessed after activity at 96. Pt left in chair upon exiting, call bell and tray in reach, nurse notified after session.      Patient will benefit from skilled intervention to address the above impairments. Patients rehabilitation potential is considered to be Good  Factors which may influence rehabilitation potential include:   []         None noted  []         Mental ability/status  [x]         Medical condition  [x]         Home/family situation and support systems  [x]         Safety awareness  []         Pain tolerance/management  []         Other:      PLAN :  Recommendations and Planned Interventions:  [x]           Bed Mobility Training             [x]    Neuromuscular Re-Education  [x]           Transfer Training                   []    Orthotic/Prosthetic Training  [x]           Gait Training                          []    Modalities  [x]           Therapeutic Exercises          []    Edema Management/Control  [x]           Therapeutic Activities            [x]    Patient and Family Training/Education  []           Other (comment):    Frequency/Duration: Patient will be followed by physical therapy once/twice daily to address goals. Discharge Recommendations: Home Health  Further Equipment Recommendations for Discharge: rolling walker     SUBJECTIVE:   Patient stated I'm scared to walk.     OBJECTIVE DATA SUMMARY:     Past Medical History:   Diagnosis Date    [facility]     prostate    [facility] disease[facility]     Hypertension     Pneumonia     Radiation effect      Past Surgical History:   Procedure Laterality Date    HX HERNIA REPAIR       Barriers to Learning/Limitations: yes;  physical  Compensate with: Verbal Cues and Tactile Cues  Prior Level of Function/Home Situation:   Home Situation  Home Environment: Private residence  # Steps to Enter: 6  Rails to Enter: Yes  Hand Rails : Bilateral  One/Two Story Residence: One story  Living Alone: No  Support Systems: Spouse/Significant Other/Partner  Patient Expects to be Discharged to[de-identified] Private residence  Current DME Used/Available at Home: nena More  Critical Behavior:  Neurologic State: Alert  Orientation Level: Oriented X4  Psychosocial  Purposeful Interaction: Yes  Pt Identified Daily Priority: Clinical issues (comment)  Caritas Process: Nurture loving kindness  Caring Interventions: Reassure; Therapeutic modalities  Reassure: Therapeutic listening  Therapeutic Modalities: Intentional therapeutic touch  Skin Condition/Temp: Warm  Skin Integrity: Intact  Skin Integumentary  Skin Color: Appropriate for ethnicity  Skin Condition/Temp: Warm  Skin Integrity: Intact  Turgor: Epidermis thin w/ loss of subcut tissue  Hair Growth: Present  Varicosities: Absent  Strength:    Strength: Generally decreased, functional  Tone & Sensation:   Tone: Normal  Sensation: Intact  Range Of Motion:  AROM: Generally decreased, functional  Functional Mobility:  Bed Mobility:  Supine to Sit: Contact guard assistance  Scooting: Contact guard assistance  Transfers:  Sit to Stand: Contact guard assistance(Bed elevated for assistance)  Stand to Sit: Contact guard assistance  Balance:   Sitting: Intact  Standing: Intact; With support  Ambulation/Gait Training:  Distance (ft): 35 Feet (ft)  Assistive Device: Gait belt;Walker, rolling  Ambulation - Level of Assistance: Contact guard assistance  Gait Description (WDL): Exceptions to WDL  Gait Abnormalities: Decreased step clearance; Step to gait  Base of Support: Narrowed  Speed/Lisbet: Slow  Step Length: Left shortened;Right shortened  Swing Pattern: Left asymmetrical;Right asymmetrical  Pain:  Pain Scale 1: Numeric (0 - 10)  Pain Intensity 1: 0  Activity Tolerance:   Fair  Please refer to the flowsheet for vital signs taken during this treatment.   After treatment:   [x]         Patient left in no apparent distress sitting up in chair  []         Patient left in no apparent distress in bed  [x]         Call bell left within reach  [x]         Nursing notified  [x]         Caregiver present (spouse)   []         Bed alarm activated    COMMUNICATION/EDUCATION:   [x]         Fall prevention education was provided and the patient/caregiver indicated understanding. [x]         Patient/family have participated as able in goal setting and plan of care. [x]         Patient/family agree to work toward stated goals and plan of care. []         Patient understands intent and goals of therapy, but is neutral about his/her participation. []         Patient is unable to participate in goal setting and plan of care. Thank you for this referral.  Brian Matamoros, PT   Time Calculation: 25 mins   Mobility:  Current  CI= 1-19%   Goal  CI= 1-19%. The severity rating is based on the Other Based on functional assessment

## 2018-10-20 NOTE — PROGRESS NOTES
Problem: Falls - Risk of  Goal: *Absence of Falls  Document Darell Fall Risk and appropriate interventions in the flowsheet.   Outcome: Progressing Towards Goal  Fall Risk Interventions:  Mobility Interventions: Communicate number of staff needed for ambulation/transfer         Medication Interventions: Patient to call before getting OOB    Elimination Interventions: Urinal in reach

## 2018-10-20 NOTE — ROUTINE PROCESS
Verbal shift change report given to iRgoberto Mackenzie (oncoming nurse) by Jb Sin RN   (offgoing nurse).  Report included the following information SBAR, Kardex, ED Summary, Procedure Summary, Intake/Output, MAR, Recent Results, Med Rec Status and Cardiac Rhythm SR   .

## 2018-10-20 NOTE — PROGRESS NOTES
Pulmonary and Sleep Medicine     Pulmonary Progress Note    Name: Abilio Schrader   :    MRN: [id number]   Date: 10/20/2018    [x]I have reviewed the flowsheet and previous days notes. Events, vitals, medications and notes from last 24 hours reviewed. Care plan discussed with nursing and patient. IMPRESSION:   ·   Patient Active Problem List   Diagnosis Code    COPD (chronic obstructive pulmonary disease) ([facility] J44.9    URIEL (acute kidney injury) ([facility] N17.9    Hypertension I10    Tobacco abuse Z72.0    Status asthmaticus with COPD (chronic obstructive pulmonary disease) ([facility]) J44.9, J45.902    PVC's (premature ventricular contractions) I49.3    COPD (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 ([facility]) J44.9    COPD with asthma and status asthmaticus ([facility]) J44.9, J45.902    Chronic renal disease, stage 3, moderately decreased glomerular filtration rate between 30-59 mL/min/1.73 square meter ([facility]) N18.3    COPD exacerbation ([facility]) J44.1    Asbestosis ([facility]) J61 ·       PLAN:   Supplemental O2 via NC, titrate flow for goal SPO2> 90%  Continue bronchodilators, pulmonary hygiene care  Steroids, taper per clinical course  Antibiotic choice: Levofloxacin  CT chest w/o contrast  Aspiration prevention bundle, head of the bed at 30' all times  Stress ulcer and DVT prophylaxis  AM labs. Diet as tolerated  Patient follows with my associate Dr. Shu Shields. Possible PFT outpatient basis  Will defer respective systems problem management to primary and other consultant and follow patient with primary and other team  Further recommendations will be based on the patient's response to recommended treatment and results of the investigation ordered. Subjective:   Patient feels little better and slowly improving SOB, sitting in bedside chair  Remained on O2 via NC  The patient reports wheezing off and on, denies cough, chest pain, or hemoptysis.     ROS:   General ROS: negative for  - fever, chills, weight loss, fatigue and malaise  Endocrine ROS: negative for - skin changes, temperature intolerance or unexpected weight changes  Cardiovascular ROS: negative for - chest pain, edema, murmur, orthopnea, palpitations or paroxysmal nocturnal dyspnea  Gastrointestinal ROS: no abdominal pain, change in bowel habits, or black or bloody stools      Vital Signs:    Blood pressure 133/69, pulse 91, temperature 98.1 °F (36.7 °C), resp. rate 22, height 5' 8\" (1.727 m), weight 91.2 kg (201 lb 1 oz), SpO2 94 %. Body mass index is 30.57 kg/m².     O2 Device: Nasal cannula   O2 Flow Rate (L/min): 3 l/min   Temp (24hrs), Av.7 °F (36.5 °C), Min:97.3 °F (36.3 °C), Max:98.1 °F (36.7 °C)       Intake/Output:   Last shift:      10/20 07 - 10/20 1900  In: 240 [P.O.:240]  Out: 275 [Urine:275]  Last 3 shifts: 10/18 1901 - 10/20 0700  In: 1180 [P.O.:1180]  Out: 7506 [Urine:1475]    Intake/Output Summary (Last 24 hours) at 10/20/2018 1514  Last data filed at 10/20/2018 0839  Gross per 24 hour   Intake 820 ml   Output 875 ml   Net -55 ml       Physical Exam:   General: AAO x3, cooperative, no distress, appears stated age  [de-identified]: PERRLA, EOMI, throat normal without erythema or exudate   Neck: No abnormally enlarged lymph nodes or thyroid, supple  Chest: increased AP diameter  Lungs: moderate air entry and scattered wheezes bilaterally, normal percussion bilaterally, no tenderness/ rash  Heart: Regular rate and rhythm, S1S2 present or without murmur or extra heart sounds  Abdomen: protuberant, bowel sounds normoactive, tympanic, abdomen is soft without significant tenderness, masses, organomegaly or guarding  Extremity: negative for edema, cyanosis, clubbing  Neuro: alert, oriented x 3, no defects noted in general exam.  Skin: Skin color, texture, turgor normal. No rashes or lesions    DATA:   Current Facility-Administered Medications   Medication Dose Route Frequency    sodium chloride (NS) flush 5-10 mL  5-10 mL IntraVENous PRN    acetaminophen (TYLENOL) tablet 650 mg  650 mg Oral Q4H PRN    naloxone (NARCAN) injection 0.4 mg  0.4 mg IntraVENous PRN    diphenhydrAMINE (BENADRYL) injection 12.5 mg  12.5 mg IntraVENous Q4H PRN    ondansetron (ZOFRAN) injection 4 mg  4 mg IntraVENous Q4H PRN    heparin (porcine) injection 5,000 Units  5,000 Units SubCUTAneous Q8H    methylPREDNISolone (PF) (SOLU-MEDROL) injection 60 mg  60 mg IntraVENous Q6H    budesonide (PULMICORT) 500 mcg/2 ml nebulizer suspension  500 mcg Nebulization BID RT    arformoterol (BROVANA) neb solution 15 mcg  15 mcg Nebulization BID RT    albuterol-ipratropium (DUO-NEB) 2.5 MG-0.5 MG/3 ML  3 mL Nebulization Q4H RT    guaiFENesin ER (MUCINEX) tablet 600 mg  600 mg Oral Q12H    levoFLOXacin (LEVAQUIN) 500 mg in D5W IVPB  500 mg IntraVENous Q24H    insulin lispro (HUMALOG) injection   SubCUTAneous AC&HS    glucose chewable tablet 16 g  4 Tab Oral PRN    glucagon (GLUCAGEN) injection 1 mg  1 mg IntraMUSCular PRN    dextrose (D50W) injection syrg 12.5-25 g  25-50 mL IntraVENous PRN    chlorthalidone (HYGROTEN) tablet 25 mg  25 mg Oral DAILY    tamsulosin (FLOMAX) capsule 0.4 mg  0.4 mg Oral DAILY    famotidine (PEPCID) tablet 20 mg  20 mg Oral BID    influenza vaccine 2018-19 (6 mos+)(PF) (FLUARIX QUAD/FLULAVAL QUAD) injection 0.5 mL  0.5 mL IntraMUSCular PRIOR TO DISCHARGE    fluticasone furoate (ARNUITY ELLIPTA) 100 mcg/puff  1 Puff Inhalation DAILY    albuterol (PROVENTIL VENTOLIN) nebulizer solution 2.5 mg  2.5 mg Nebulization Q2H PRN                    Labs:  Recent Results (from the past 24 hour(s))   GLUCOSE, POC    Collection Time: 10/19/18  4:26 PM   Result Value Ref Range    Glucose (POC) 155 (H) 70 - 110 mg/dL   GLUCOSE, POC    Collection Time: 10/19/18 10:25 PM   Result Value Ref Range    Glucose (POC) 149 (H) 70 - 377 mg/dL   METABOLIC PANEL, BASIC    Collection Time: 10/20/18  5:20 AM   Result Value Ref Range    Sodium 131 (L) 136 - 145 mmol/L    Potassium 4.2 3.5 - 5.5 mmol/L    Chloride 96 (L) 100 - 108 mmol/L    CO2 24 21 - 32 mmol/L    Anion gap 11 3.0 - 18 mmol/L    Glucose 150 (H) 74 - 99 mg/dL    BUN 23 (H) 7.0 - 18 MG/DL    Creatinine 1.36 (H) 0.6 - 1.3 MG/DL    BUN/Creatinine ratio 17 12 - 20      GFR est AA >60 >60 ml/min/1.73m2    GFR est non-AA 51 (L) >60 ml/min/1.73m2    Calcium 9.0 8.5 - 10.1 MG/DL   MAGNESIUM    Collection Time: 10/20/18  5:20 AM   Result Value Ref Range    Magnesium 1.8 1.6 - 2.6 mg/dL   CBC WITH AUTOMATED DIFF    Collection Time: 10/20/18  5:20 AM   Result Value Ref Range    WBC 11.4 4.6 - 13.2 K/uL    RBC 3.86 (L) 4.70 - 5.50 M/uL    HGB 11.3 (L) 13.0 - 16.0 g/dL    HCT 33.6 (L) 36.0 - 48.0 %    MCV 87.0 74.0 - 97.0 FL    MCH 29.3 24.0 - 34.0 PG    MCHC 33.6 31.0 - 37.0 g/dL    RDW 13.3 11.6 - 14.5 %    PLATELET 518 700 - 384 K/uL    MPV 8.7 (L) 9.2 - 11.8 FL    NEUTROPHILS 88 (H) 42 - 75 %    BAND NEUTROPHILS 6 (H) 0 - 5 %    LYMPHOCYTES 5 (L) 20 - 51 %    MONOCYTES 1 (L) 2 - 9 %    EOSINOPHILS 0 0 - 5 %    BASOPHILS 0 0 - 3 %    ABS. NEUTROPHILS 10.0 (H) 1.8 - 8.0 K/UL    ABS. LYMPHOCYTES 0.6 (L) 0.8 - 3.5 K/UL    ABS. MONOCYTES 0.1 0 - 1.0 K/UL    ABS. EOSINOPHILS 0.0 0.0 - 0.4 K/UL    ABS.  BASOPHILS 0.0 0.0 - 0.1 K/UL    RBC COMMENTS NORMOCYTIC, NORMOCHROMIC      DF MANUAL     GLUCOSE, POC    Collection Time: 10/20/18  7:15 AM   Result Value Ref Range    Glucose (POC) 146 (H) 70 - 110 mg/dL   GLUCOSE, POC    Collection Time: 10/20/18 11:51 AM   Result Value Ref Range    Glucose (POC) 142 (H) 70 - 110 mg/dL           Recent Labs     10/19/18  0215   FIO2I 0.21   HCO3I 23.8   PCO2I 39.6   PHI 7.388   PO2I 83     All Micro Results     Procedure Component Value Units Date/Time    INFLUENZA A & B AG (RAPID TEST) [[id number]] Collected:  10/19/18 0620    Order Status:  Completed Specimen:  Nasopharyngeal from Nasal washing Updated:  10/19/18 0723     Influenza A Antigen NEGATIVE         Comment: A negative result does not exclude influenza virus infection, seasonal or H1N1 due to suboptimal sensitivity. If influenza is circulating in your community, a diagnosis of influenza should be considered based on a patients clinical presentation and empiric antiviral treatment should be considered, if indicated. Influenza B Antigen NEGATIVE              Imaging:  [x]I have personally reviewed the patients chest radiographs images and report with the patient    Results from [place] encounter on 10/19/18   XR CHEST PORT    Narrative A portable AP radiograph of the chest was obtained at 0104 hours:  INDICATION:  Shortness of breath. COMPARISON:  Multiple prior studies most recent being 9/8/2018. FINDINGS:      Heart and mediastinum: Unremarkable. Lungs and pleura: Coarse bronchovascular markings are seen with calcified  pleural plaques in the right lung similar to the prior study. No new  consolidation or pleural effusion is identified. Aorta: Unremarkable. Bones: Within normal limits for age. Other: None. Impression Impression:    Chronic changes as described stable since 9/8/2018 without acute process. Pleural calcifications may be related to asbestos exposure. Results from [place] encounter on 03/18/17   CT CHEST WO CONT    Narrative COMPARISON: Chest x-ray dated March 18, 2017    INDICATION: Blunt trauma. Right lower lobe mass. TECHNIQUE: Axial was performed through the chest without contrast.  Coronal and  sagittal reformations were generated. Dose reduction techniques used: Automated  exposure control, adjustment of the mAs and/or kVp according to patient's size,  and iterative reconstruction techniques.    ============    LUNGS and pleura:  There is an ovoid peripherally calcified heterogeneous but  predominately hyperattenuating pleural-based mass along the anterior right upper  lobe which measures 2.6 x 1.8 cm transaxial by up to 4.9 cm craniocaudal. This  likely corresponds to the ovoid opacity described on the prior radiographs and  was present dating back to the earliest prior exam from 2008 although there are  progressive dystrophic calcifications. Additionally, there is a complex loculated pleural fluid collection with  dystrophic calcifications along the periphery as well as internal heterogeneous  high attenuation and scattered calcific debris. The collection measures  approximately 9.1 x 3.7 cm transaxial by approximately 12.1 cm craniocaudal.  Additional scattered calcified pleural plaque is seen extending along the  posterior right upper lobe. Associated asymmetric volume loss in the right lung. There is mild dependent  pleural thickening inferiorly on the left. There are a few streaky bands of  scarring and atelectasis throughout the right lung. AIRWAY: Unremarkable. MEDIASTINUM: Normal heart size. No pericardial effusion. Great vessels are  unremarkable. No thoracic adenopathy. UPPER ABDOMEN: Scattered calcified granulomas throughout the liver. OTHER: No aggressive osseous lesions. Exaggerated thoracic kyphosis. Multilevel  spondylosis. The bones are osteopenic.    ============      Impression IMPRESSION:      1. Chronic dystrophic pleural-based masslike fluid collections with  heterogeneous complex internal attenuation suggesting sequela of a chronic  granulomatous process. The appearance is atypical for calcified pleural plaques  in the setting of asbestos-related pleural disease. Findings may represent a  form of a chronic empyema and correspond to the radiographic findings dating  back to at least 2008. [x]See my orders for details    My assessment, plan of care, findings, medications, side effects etc were discussed with:  [x]nursing []PT/OT    []respiratory therapy []Dr. Roldan Sahni [x]Patient     [x]high complex decision making performed and > 50% time spent in face to face assessment.     Mady Woods MD

## 2018-10-20 NOTE — PROGRESS NOTES
Hospitalist Progress Note    Patient: Maria E Walker MRN: [id number]  CSN: [id number]    YOB: 1945  Age: 74 y.o. Sex: male    DOA: 10/19/2018 LOS:  LOS: 0 days            Assessment/Plan     Principal Problem:    Status asthmaticus with COPD (chronic obstructive pulmonary disease) ([address] (4/20/2018)    Active Problems:    Hypertension ()      Chronic renal disease, stage 3, moderately decreased glomerular filtration rate between 30-59 mL/min/1.73 square meter (HCC) (7/20/2018)      COPD exacerbation ([address] (10/19/2018)      Asbestosis ([address] (10/19/2018)    Status asthmatics with COPD: Discussed the case with Dr. Kristen Riddle. Continue Iv steroid, breathing tx, levaquin for empiric treatment, mucinex, continue Brovana and Pulmicort    Ct chest: Chronic dystrophic pleural-based masslike fluid collections with heterogeneous complex internal attenuation suggesting sequela of a chronic   granulomatous process. Findings may represent a form of a chronic empyema      Asbestosis: On NC O2     HTN, accelerated: Continue home medication.     ckd3 stable: at the baseline Continue renal dose medication. Avoid nsaids     full code      DVT : heparin,  ppi proph    Remain hospital care. CC:   SOB/Wheezing         Subjective:     Pt was seen and examined with the nurse in the morning round. Still coughing/wheezing. Reported that he has been having greenish sputum. Review of systems  General: No fevers or chills. Cardiovascular: No chest pain or pressure. No palpitations. Pulmonary: + cough/SOB  Gastrointestinal: No nausea, vomiting. Objective:      Visit Vitals  /82 (BP 1 Location: Right arm, BP Patient Position: At rest;Supine)   Pulse 95   Temp 97.3 °F (36.3 °C)   Resp 22   Ht 5' 8\" (1.727 m)   Wt 91.2 kg (201 lb 1 oz)   SpO2 97%   BMI 30.57 kg/m²       Physical Exam:    Gen: NAD, moderate respiratory distress with NC O2  Heent:  MMM, NC, AT. Cor: s1s2 RRR. No MRG.   PMI mid 5th intercostal space.  Resp:  Coarse BS with wheezing diffusely  Abd:  NT ND.  BS positive. No rebound or guarding. No masses. Ext: No edema or cyanosis. Intake and Output:  Current Shift:  10/20 0701 - 10/20 1900  In: 240 [P.O.:240]  Out: 275 [Urine:275]  Last three shifts:  10/18 1901 - 10/20 0700  In: 1180 [P.O.:1180]  Out: 1475 [[facility]:0991]    Labs: Results:       Chemistry Recent Labs     10/20/18  0520 10/19/18  0034   * 122*   * 134*   K 4.2 3.7   CL 96* 99*   CO2 24 28   BUN 23* 19*   CREA 1.36* 1.44*   CA 9.0 8.9   AGAP 11 7   BUCR 17 13   AP  --  89   TP  --  6.8   ALB  --  3.0*   GLOB  --  3.8   AGRAT  --  0.8      CBC w/Diff Recent Labs     10/20/18  0520 10/19/18  0034   WBC 11.4 10.4   RBC 3.86* 3.92*   HGB 11.3* 11.5*   HCT 33.6* 34.8*    300   GRANS 88* 66   LYMPH 5* 13*   EOS 0 14*      Cardiac Enzymes Recent Labs     10/19/18  0034      CKND1 4.2*      Coagulation No results for input(s): PTP, INR, APTT in the last 72 hours. No lab exists for component: INREXT    Lipid Panel No results found for: CHOL, CHOLPOCT, CHOLX, CHLST, CHOLV, 336314, HDL, LDL, LDLC, DLDLP, 807506, VLDLC, VLDL, TGLX, TRIGL, TRIGP, TGLPOCT, CHHD, CHHDX   BNP No results for input(s): BNPP in the last 72 hours.    Liver Enzymes Recent Labs     10/19/18  0034   TP 6.8   ALB 3.0*   AP 89   SGOT 13*      Thyroid Studies No results found for: T4, T3U, TSH, TSHEXT     Procedures/imaging: see electronic medical records for all procedures/Xrays and details which were not copied into this note but were reviewed prior to creation of Plan      Medications Reviewed  Zackery Foss MD

## 2018-10-20 NOTE — PROGRESS NOTES
5724 Received report and assumed pt care. RT performed neb txs at this time. Pt in bed, no distress noted at this time. Please see flowsheets for assessment.   1100   Visit Vitals  /65 (BP 1 Location: Right arm, BP Patient Position: Sitting)   Pulse 85   Temp 98.2 °F (36.8 °C)   Resp 22   Ht 5' 8\" (1.727 m)   Wt 91.2 kg (201 lb 1 oz)   SpO2 97%   BMI 30.57 kg/m²

## 2018-10-21 LAB
ANION GAP SERPL CALC-SCNC: 11 MMOL/L (ref 3–18)
BASOPHILS # BLD: 0 K/UL (ref 0–0.1)
BASOPHILS NFR BLD: 0 % (ref 0–3)
BUN SERPL-MCNC: 30 MG/DL (ref 7–18)
BUN/CREAT SERPL: 23 (ref 12–20)
CALCIUM SERPL-MCNC: 8.6 MG/DL (ref 8.5–10.1)
CHLORIDE SERPL-SCNC: 98 MMOL/L (ref 100–108)
CO2 SERPL-SCNC: 25 MMOL/L (ref 21–32)
CREAT SERPL-MCNC: 1.33 MG/DL (ref 0.6–1.3)
DIFFERENTIAL METHOD BLD: ABNORMAL
EOSINOPHIL # BLD: 0 K/UL (ref 0–0.4)
EOSINOPHIL NFR BLD: 0 % (ref 0–5)
ERYTHROCYTE [DISTWIDTH] IN BLOOD BY AUTOMATED COUNT: 13.1 % (ref 11.6–14.5)
GLUCOSE BLD STRIP.AUTO-MCNC: 112 MG/DL (ref 70–110)
GLUCOSE BLD STRIP.AUTO-MCNC: 130 MG/DL (ref 70–110)
GLUCOSE BLD STRIP.AUTO-MCNC: 153 MG/DL (ref 70–110)
GLUCOSE BLD STRIP.AUTO-MCNC: 168 MG/DL (ref 70–110)
GLUCOSE SERPL-MCNC: 138 MG/DL (ref 74–99)
HCT VFR BLD AUTO: 31.6 % (ref 36–48)
HGB BLD-MCNC: 10.6 G/DL (ref 13–16)
LYMPHOCYTES # BLD: 0.6 K/UL (ref 0.8–3.5)
LYMPHOCYTES NFR BLD: 6 % (ref 20–51)
MAGNESIUM SERPL-MCNC: 1.8 MG/DL (ref 1.6–2.6)
MCH RBC QN AUTO: 29.2 PG (ref 24–34)
MCHC RBC AUTO-ENTMCNC: 33.5 G/DL (ref 31–37)
MCV RBC AUTO: 87.1 FL (ref 74–97)
MONOCYTES # BLD: 0.2 K/UL (ref 0–1)
MONOCYTES NFR BLD: 2 % (ref 2–9)
NEUTS BAND NFR BLD MANUAL: 2 % (ref 0–5)
NEUTS SEG # BLD: 8.6 K/UL (ref 1.8–8)
NEUTS SEG NFR BLD: 90 % (ref 42–75)
PLATELET # BLD AUTO: 291 K/UL (ref 135–420)
PMV BLD AUTO: 8.9 FL (ref 9.2–11.8)
POTASSIUM SERPL-SCNC: 3.5 MMOL/L (ref 3.5–5.5)
RBC # BLD AUTO: 3.63 M/UL (ref 4.7–5.5)
RBC MORPH BLD: ABNORMAL
SODIUM SERPL-SCNC: 134 MMOL/L (ref 136–145)
WBC # BLD AUTO: 9.5 K/UL (ref 4.6–13.2)

## 2018-10-21 PROCEDURE — 82962 GLUCOSE BLOOD TEST: CPT

## 2018-10-21 PROCEDURE — 77010033678 HC OXYGEN DAILY

## 2018-10-21 PROCEDURE — 83735 ASSAY OF MAGNESIUM: CPT | Performed by: HOSPITALIST

## 2018-10-21 PROCEDURE — 74011250637 HC RX REV CODE- 250/637: Performed by: HOSPITALIST

## 2018-10-21 PROCEDURE — 99218 HC RM OBSERVATION: CPT

## 2018-10-21 PROCEDURE — 36415 COLL VENOUS BLD VENIPUNCTURE: CPT | Performed by: HOSPITALIST

## 2018-10-21 PROCEDURE — 85025 COMPLETE CBC W/AUTO DIFF WBC: CPT | Performed by: HOSPITALIST

## 2018-10-21 PROCEDURE — 97530 THERAPEUTIC ACTIVITIES: CPT

## 2018-10-21 PROCEDURE — 94760 N-INVAS EAR/PLS OXIMETRY 1: CPT

## 2018-10-21 PROCEDURE — 74011636637 HC RX REV CODE- 636/637: Performed by: HOSPITALIST

## 2018-10-21 PROCEDURE — 80048 BASIC METABOLIC PNL TOTAL CA: CPT | Performed by: HOSPITALIST

## 2018-10-21 PROCEDURE — 65660000000 HC RM CCU STEPDOWN

## 2018-10-21 PROCEDURE — 94640 AIRWAY INHALATION TREATMENT: CPT

## 2018-10-21 PROCEDURE — 97116 GAIT TRAINING THERAPY: CPT

## 2018-10-21 PROCEDURE — 74011250636 HC RX REV CODE- 250/636: Performed by: HOSPITALIST

## 2018-10-21 PROCEDURE — 74011000250 HC RX REV CODE- 250: Performed by: HOSPITALIST

## 2018-10-21 RX ADMIN — FAMOTIDINE 20 MG: 20 TABLET ORAL at 22:08

## 2018-10-21 RX ADMIN — Medication 10 ML: at 13:15

## 2018-10-21 RX ADMIN — METHYLPREDNISOLONE SODIUM SUCCINATE 60 MG: 125 INJECTION, POWDER, FOR SOLUTION INTRAMUSCULAR; INTRAVENOUS at 17:58

## 2018-10-21 RX ADMIN — IPRATROPIUM BROMIDE AND ALBUTEROL SULFATE 3 ML: .5; 3 SOLUTION RESPIRATORY (INHALATION) at 11:18

## 2018-10-21 RX ADMIN — IPRATROPIUM BROMIDE AND ALBUTEROL SULFATE 3 ML: .5; 3 SOLUTION RESPIRATORY (INHALATION) at 15:11

## 2018-10-21 RX ADMIN — GUAIFENESIN 600 MG: 600 TABLET, EXTENDED RELEASE ORAL at 09:38

## 2018-10-21 RX ADMIN — TAMSULOSIN HYDROCHLORIDE 0.4 MG: 0.4 CAPSULE ORAL at 09:38

## 2018-10-21 RX ADMIN — INSULIN LISPRO 2 UNITS: 100 INJECTION, SOLUTION INTRAVENOUS; SUBCUTANEOUS at 08:06

## 2018-10-21 RX ADMIN — INSULIN LISPRO 2 UNITS: 100 INJECTION, SOLUTION INTRAVENOUS; SUBCUTANEOUS at 17:58

## 2018-10-21 RX ADMIN — ARFORMOTEROL TARTRATE 15 MCG: 15 SOLUTION RESPIRATORY (INHALATION) at 20:20

## 2018-10-21 RX ADMIN — HEPARIN SODIUM 5000 UNITS: 5000 INJECTION INTRAVENOUS; SUBCUTANEOUS at 08:06

## 2018-10-21 RX ADMIN — FLUTICASONE FUROATE 1 PUFF: 100 POWDER RESPIRATORY (INHALATION) at 13:20

## 2018-10-21 RX ADMIN — GUAIFENESIN 600 MG: 600 TABLET, EXTENDED RELEASE ORAL at 22:08

## 2018-10-21 RX ADMIN — LEVOFLOXACIN 500 MG: 5 INJECTION, SOLUTION INTRAVENOUS at 05:21

## 2018-10-21 RX ADMIN — BUDESONIDE 500 MCG: 0.5 INHALANT RESPIRATORY (INHALATION) at 07:11

## 2018-10-21 RX ADMIN — IPRATROPIUM BROMIDE AND ALBUTEROL SULFATE 3 ML: .5; 3 SOLUTION RESPIRATORY (INHALATION) at 07:11

## 2018-10-21 RX ADMIN — IPRATROPIUM BROMIDE AND ALBUTEROL SULFATE 3 ML: .5; 3 SOLUTION RESPIRATORY (INHALATION) at 20:20

## 2018-10-21 RX ADMIN — IPRATROPIUM BROMIDE AND ALBUTEROL SULFATE 3 ML: .5; 3 SOLUTION RESPIRATORY (INHALATION) at 04:47

## 2018-10-21 RX ADMIN — FAMOTIDINE 20 MG: 20 TABLET ORAL at 09:38

## 2018-10-21 RX ADMIN — METHYLPREDNISOLONE SODIUM SUCCINATE 60 MG: 125 INJECTION, POWDER, FOR SOLUTION INTRAMUSCULAR; INTRAVENOUS at 05:21

## 2018-10-21 RX ADMIN — METHYLPREDNISOLONE SODIUM SUCCINATE 60 MG: 125 INJECTION, POWDER, FOR SOLUTION INTRAMUSCULAR; INTRAVENOUS at 13:10

## 2018-10-21 RX ADMIN — CHLORTHALIDONE 25 MG: 25 TABLET ORAL at 09:38

## 2018-10-21 RX ADMIN — HEPARIN SODIUM 5000 UNITS: 5000 INJECTION INTRAVENOUS; SUBCUTANEOUS at 16:12

## 2018-10-21 RX ADMIN — BUDESONIDE 500 MCG: 0.5 INHALANT RESPIRATORY (INHALATION) at 20:20

## 2018-10-21 RX ADMIN — ARFORMOTEROL TARTRATE 15 MCG: 15 SOLUTION RESPIRATORY (INHALATION) at 07:11

## 2018-10-21 RX ADMIN — HEPARIN SODIUM 5000 UNITS: 5000 INJECTION INTRAVENOUS; SUBCUTANEOUS at 22:08

## 2018-10-21 RX ADMIN — METHYLPREDNISOLONE SODIUM SUCCINATE 60 MG: 125 INJECTION, POWDER, FOR SOLUTION INTRAMUSCULAR; INTRAVENOUS at 00:14

## 2018-10-21 NOTE — PROGRESS NOTES
Hospitalist Progress Note    Patient: Shekhar Jackson MRN: [id number]  CSN: [id number]    YOB: 1945  Age: 74 y.o. Sex: male    DOA: 10/19/2018 LOS:  LOS: 0 days            Assessment/Plan     Principal Problem:    Status asthmaticus with COPD (chronic obstructive pulmonary disease) ([address] (4/20/2018)    Active Problems:    Hypertension ()      Chronic renal disease, stage 3, moderately decreased glomerular filtration rate between 30-59 mL/min/1.73 square meter (HCC) (7/20/2018)      COPD exacerbation ([address] (10/19/2018)      Asbestosis ([address] (10/19/2018)      Status asthmatics with COPD: Improving. Discussed the case with Dr. Amber Pelletier. Continue Iv steroid, breathing tx, levaquin for empiric treatment, mucinex, continue Brovana and Pulmicort     Ct chest: Chronic dystrophic pleural-based masslike fluid collections with heterogeneous complex internal attenuation suggesting sequela of a chronic   granulomatous process.  Findings may represent a form of a chronic empyema      Asbestosis: On NC O2     HTN, accelerated: Continue home medication.     ckd3 stable: at the baseline Continue renal dose medication. Avoid nsaids      full code      DVT : heparin,  ppi proph     Remain hospital care.         CC:   SOB/Wheezing         Subjective:      Pt was seen and examined with the nurse in the morning round.      \" I am feeling better\" Less coughing/wheezing. Reported that he has been having greenish sputum. Review of systems  General: No fevers or chills. Cardiovascular: No chest pain or pressure. No palpitations. Pulmonary: No cough, SOB  Gastrointestinal: No nausea, vomiting. Objective:      Visit Vitals  /62 (BP 1 Location: Right arm, BP Patient Position: At rest;Supine)   Pulse 70   Temp 97.6 °F (36.4 °C)   Resp 20   Ht 5' 8\" (1.727 m)   Wt 91.2 kg (201 lb 1 oz)   SpO2 96%   BMI 30.57 kg/m²       Physical Exam:    Gen: NAD, NC O2  Heent:  MMM, NC, AT. Cor: s1s2 RRR. No MRG.   PMI mid 5th intercostal space. Resp:  Coarse BS b/l with wheezing. Abd:  NT ND.  BS positive. No rebound or guarding. No masses. Ext: No edema or cyanosis. Intake and Output:  Current Shift:  10/21 0701 - 10/21 1900  In: 480 [P.O.:480]  Out: 875 [Urine:875]  Last three shifts:  10/19 1901 - 10/21 0700  In: 700 [P.O.:600; I.V.:100]  Out: 1675 [Urine:1675]    Labs: Results:       Chemistry Recent Labs     10/21/18  0416 10/20/18  0520 10/19/18  0034   * 150* 122*   * 131* 134*   K 3.5 4.2 3.7   CL 98* 96* 99*   CO2 25 24 28   BUN 30* 23* 19*   CREA 1.33* 1.36* 1.44*   CA 8.6 9.0 8.9   AGAP 11 11 7   BUCR 23* 17 13   AP  --   --  89   TP  --   --  6.8   ALB  --   --  3.0*   GLOB  --   --  3.8   AGRAT  --   --  0.8      CBC w/Diff Recent Labs     10/21/18  0416 10/20/18  0520 10/19/18  0034   WBC 9.5 11.4 10.4   RBC 3.63* 3.86* 3.92*   HGB 10.6* 11.3* 11.5*   HCT 31.6* 33.6* 34.8*    295 300   GRANS 90* 88* 66   LYMPH 6* 5* 13*   EOS 0 0 14*      Cardiac Enzymes Recent Labs     10/19/18  0034      CKND1 4.2*      Coagulation No results for input(s): PTP, INR, APTT in the last 72 hours. No lab exists for component: INREXT    Lipid Panel No results found for: CHOL, CHOLPOCT, CHOLX, CHLST, CHOLV, 557692, HDL, LDL, LDLC, DLDLP, 876652, VLDLC, VLDL, TGLX, TRIGL, TRIGP, TGLPOCT, CHHD, CHHDX   BNP No results for input(s): BNPP in the last 72 hours.    Liver Enzymes Recent Labs     10/19/18  0034   TP 6.8   ALB 3.0*   AP 89   SGOT 13*      Thyroid Studies No results found for: T4, T3U, TSH, TSHEXT     Procedures/imaging: see electronic medical records for all procedures/Xrays and details which were not copied into this note but were reviewed prior to creation of Plan      Medications Reviewed  Taryn Iraheta MD

## 2018-10-21 NOTE — PROGRESS NOTES
Pulmonary and Sleep Medicine     Pulmonary Progress Note    Name: Phi Vick   : [birth year]   MRN: [id number]   Date: 10/21/2018    [x]I have reviewed the flowsheet and previous days notes. Events, vitals, medications and notes from last 24 hours reviewed. Care plan discussed with nursing and patient. IMPRESSION:   ·   Patient Active Problem List   Diagnosis Code    COPD (chronic obstructive pulmonary disease) ([address] J44.9    URIEL (acute kidney injury) ([facility] [facility] N17.9    Hypertension I10    Tobacco abuse Z72.0    Status asthmaticus with COPD (chronic obstructive pulmonary disease) ([facility]) J44.9, J45.902    PVC's (premature ventricular contractions) I49.3    COPD (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 ([facility]) J44.9    COPD with asthma and status asthmaticus ([facility]) J44.9, J45.902    Chronic renal disease, stage 3, moderately decreased glomerular filtration rate between 30-59 mL/min/1.73 square meter ([facility]) N18.3    COPD exacerbation ([facility]) J44.1    Asbestosis ([facility]) J61 ·    Chronic pleural finding on right side stable on CT chest   PLAN:   Supplemental O2 via NC, titrate flow for goal SPO2> 90%  Continue bronchodilators, pulmonary hygiene care  Steroids, taper per clinical course  Antibiotic choice: Levofloxacin  Aspiration prevention bundle, head of the bed at 30' all times  Stress ulcer and DVT prophylaxis  Diet as tolerated  Patient follows with my associate Dr. Jazmine Guerrero. Possible PFT outpatient basis  Will defer respective systems problem management to primary and other consultant and follow patient with primary and other team  Further recommendations will be based on the patient's response to recommended treatment and results of the investigation ordered. Subjective:   Patient feels significantly better and improved SOB, sitting in bed  Still has wheezing  Remained on O2 via NC  The patient denies cough, chest pain, or hemoptysis.     ROS:   General ROS: negative for  - fever, chills, weight loss, fatigue and malaise  Endocrine ROS: negative for - skin changes, temperature intolerance or unexpected weight changes  Cardiovascular ROS: negative for - chest pain, edema, murmur, orthopnea, palpitations or paroxysmal nocturnal dyspnea  Gastrointestinal ROS: no abdominal pain, change in bowel habits, or black or bloody stools      Vital Signs:    Blood pressure 140/67, pulse 72, temperature 98.5 °F (36.9 °C), resp. rate 20, height 5' 8\" (1.727 m), weight 91.2 kg (201 lb 1 oz), SpO2 97 %. Body mass index is 30.57 kg/m². O2 Device: Nasal cannula   O2 Flow Rate (L/min): 1 l/min   Temp (24hrs), Av.1 °F (36.7 °C), Min:97.6 °F (36.4 °C), Max:98.5 °F (36.9 °C)       Intake/Output:   Last shift:      10/21 07 - 10/21 190  In: 480 [P.O.:480]  Out: 375 [Urine:875]  Last 3 shifts: 10/19 1901 - 10/21 0700  In: 700 [P.O.:600;  I.V.:100]  Out: 1675 [Urine:1675]    Intake/Output Summary (Last 24 hours) at 10/21/2018 1508  Last data filed at 10/21/2018 1320  Gross per 24 hour   Intake 820 ml   Output 1675 ml   Net -855 ml       Physical Exam:   General: AAO x3, cooperative, no distress, appears stated age  [de-identified]: PERRLA, EOMI, throat normal without erythema or exudate   Neck: No abnormally enlarged lymph nodes or thyroid, supple  Chest: increased AP diameter  Lungs: moderate air entry and improving scattered wheezes bilaterally, normal percussion bilaterally, no tenderness/ rash  Heart: Regular rate and rhythm, S1S2 present or without murmur or extra heart sounds  Abdomen: protuberant, bowel sounds normoactive, tympanic, abdomen is soft without significant tenderness, masses, organomegaly or guarding  Extremity: negative for edema, cyanosis, clubbing  Neuro: alert, oriented x 3, no defects noted in general exam.  Skin: Skin color, texture, turgor normal. No rashes or lesions    DATA:   Current Facility-Administered Medications   Medication Dose Route Frequency    sodium chloride (NS) flush 5-10 mL  5-10 mL IntraVENous PRN    acetaminophen (TYLENOL) tablet 650 mg  650 mg Oral Q4H PRN    naloxone (NARCAN) injection 0.4 mg  0.4 mg IntraVENous PRN    diphenhydrAMINE (BENADRYL) injection 12.5 mg  12.5 mg IntraVENous Q4H PRN    ondansetron (ZOFRAN) injection 4 mg  4 mg IntraVENous Q4H PRN    heparin (porcine) injection 5,000 Units  5,000 Units SubCUTAneous Q8H    methylPREDNISolone (PF) (SOLU-MEDROL) injection 60 mg  60 mg IntraVENous Q6H    budesonide (PULMICORT) 500 mcg/2 ml nebulizer suspension  500 mcg Nebulization BID RT    arformoterol (BROVANA) neb solution 15 mcg  15 mcg Nebulization BID RT    albuterol-ipratropium (DUO-NEB) 2.5 MG-0.5 MG/3 ML  3 mL Nebulization Q4H RT    guaiFENesin ER (MUCINEX) tablet 600 mg  600 mg Oral Q12H    levoFLOXacin (LEVAQUIN) 500 mg in D5W IVPB  500 mg IntraVENous Q24H    insulin lispro (HUMALOG) injection   SubCUTAneous AC&HS    glucose chewable tablet 16 g  4 Tab Oral PRN    glucagon (GLUCAGEN) injection 1 mg  1 mg IntraMUSCular PRN    dextrose (D50W) injection syrg 12.5-25 g  25-50 mL IntraVENous PRN    chlorthalidone (HYGROTEN) tablet 25 mg  25 mg Oral DAILY    tamsulosin (FLOMAX) capsule 0.4 mg  0.4 mg Oral DAILY    famotidine (PEPCID) tablet 20 mg  20 mg Oral BID    influenza vaccine 2018-19 (6 mos+)(PF) (FLUARIX QUAD/FLULAVAL QUAD) injection 0.5 mL  0.5 mL IntraMUSCular PRIOR TO DISCHARGE    fluticasone furoate (ARNUITY ELLIPTA) 100 mcg/puff  1 Puff Inhalation DAILY    albuterol (PROVENTIL VENTOLIN) nebulizer solution 2.5 mg  2.5 mg Nebulization Q2H PRN                    Labs:  Recent Results (from the past 24 hour(s))   GLUCOSE, POC    Collection Time: 10/20/18  3:54 PM   Result Value Ref Range    Glucose (POC) 145 (H) 70 - 110 mg/dL   GLUCOSE, POC    Collection Time: 10/20/18  9:34 PM   Result Value Ref Range    Glucose (POC) 154 (H) 70 - 305 mg/dL   METABOLIC PANEL, BASIC    Collection Time: 10/21/18  4:16 AM   Result Value Ref Range    Sodium 134 (L) 136 - 145 mmol/L    Potassium 3.5 3.5 - 5.5 mmol/L    Chloride 98 (L) 100 - 108 mmol/L    CO2 25 21 - 32 mmol/L    Anion gap 11 3.0 - 18 mmol/L    Glucose 138 (H) 74 - 99 mg/dL    BUN 30 (H) 7.0 - 18 MG/DL    Creatinine 1.33 (H) 0.6 - 1.3 MG/DL    BUN/Creatinine ratio 23 (H) 12 - 20      GFR est AA >60 >60 ml/min/1.73m2    GFR est non-AA 52 (L) >60 ml/min/1.73m2    Calcium 8.6 8.5 - 10.1 MG/DL   MAGNESIUM    Collection Time: 10/21/18  4:16 AM   Result Value Ref Range    Magnesium 1.8 1.6 - 2.6 mg/dL   CBC WITH AUTOMATED DIFF    Collection Time: 10/21/18  4:16 AM   Result Value Ref Range    WBC 9.5 4.6 - 13.2 K/uL    RBC 3.63 (L) 4.70 - 5.50 M/uL    HGB 10.6 (L) 13.0 - 16.0 g/dL    HCT 31.6 (L) 36.0 - 48.0 %    MCV 87.1 74.0 - 97.0 FL    MCH 29.2 24.0 - 34.0 PG    MCHC 33.5 31.0 - 37.0 g/dL    RDW 13.1 11.6 - 14.5 %    PLATELET 157 476 - 924 K/uL    MPV 8.9 (L) 9.2 - 11.8 FL    NEUTROPHILS 90 (H) 42 - 75 %    BAND NEUTROPHILS 2 0 - 5 %    LYMPHOCYTES 6 (L) 20 - 51 %    MONOCYTES 2 2 - 9 %    EOSINOPHILS 0 0 - 5 %    BASOPHILS 0 0 - 3 %    ABS. NEUTROPHILS 8.6 (H) 1.8 - 8.0 K/UL    ABS. LYMPHOCYTES 0.6 (L) 0.8 - 3.5 K/UL    ABS. MONOCYTES 0.2 0 - 1.0 K/UL    ABS. EOSINOPHILS 0.0 0.0 - 0.4 K/UL    ABS.  BASOPHILS 0.0 0.0 - 0.1 K/UL    RBC COMMENTS NORMOCYTIC, NORMOCHROMIC      DF MANUAL     GLUCOSE, POC    Collection Time: 10/21/18  6:00 AM   Result Value Ref Range    Glucose (POC) 168 (H) 70 - 110 mg/dL   GLUCOSE, POC    Collection Time: 10/21/18 10:58 AM   Result Value Ref Range    Glucose (POC) 130 (H) 70 - 110 mg/dL           Recent Labs     10/19/18  0215   FIO2I 0.21   HCO3I 23.8   PCO2I 39.6   PHI 7.388   PO2I 83     All Micro Results     Procedure Component Value Units Date/Time    INFLUENZA A & B AG (RAPID TEST) [[id number]] Collected:  10/19/18 0620    Order Status:  Completed Specimen:  Nasopharyngeal from Nasal washing Updated:  10/19/18 0723     Influenza A Antigen NEGATIVE Comment: A negative result does not exclude influenza virus infection, seasonal or H1N1 due to suboptimal sensitivity. If influenza is circulating in your community, a diagnosis of influenza should be considered based on a patients clinical presentation and empiric antiviral treatment should be considered, if indicated. Influenza B Antigen NEGATIVE              Imaging:  [x]I have personally reviewed the patients chest radiographs images and report with the patient    Results from [place] encounter on 10/19/18   XR CHEST PORT    Narrative A portable AP radiograph of the chest was obtained at 0104 hours:  INDICATION:  Shortness of breath. COMPARISON:  Multiple prior studies most recent being 9/8/2018. FINDINGS:      Heart and mediastinum: Unremarkable. Lungs and pleura: Coarse bronchovascular markings are seen with calcified  pleural plaques in the right lung similar to the prior study. No new  consolidation or pleural effusion is identified. Aorta: Unremarkable. Bones: Within normal limits for age. Other: None. Impression Impression:    Chronic changes as described stable since 9/8/2018 without acute process. Pleural calcifications may be related to asbestos exposure. Results from [place] encounter on 10/19/18   CT CHEST WO CONT    Narrative EXAM: CT chest     INDICATION: Dyspnea on exertion    COMPARISON: May 19, 2017    TECHNIQUE: Axial CT imaging from the thoracic inlet through the diaphragm  without intravenous contrast. Multiplanar reformats were generated. One or more  dose reduction techniques were used on this CT: automated exposure control,  adjustment of the mAs and/or kVp according to patient size, and iterative  reconstruction techniques. The specific techniques used on this CT exam have  been documented in the patient's electronic medical record.    _______________    FINDINGS:    LYMPH NODES: There are no enlarged lymph nodes.     PLEURA: There is a complex fluid versus soft tissue density in the right pleural  space with peripheral calcification unchanged from prior exam. This may  represent chronic empyema or old calcified hematoma. There is another  pleural-based calcified lesion more anteriorly in the right hemithorax. Again  may represent chronic empyema or calcified hematoma. No gross interval change  appearance of these calcified lesions. No left pleural effusion seen. HEART: Normal in size without pericardial effusion. Significant calcific  coronary artery disease present. VASCULATURE/MEDIASTINUM: There is a small hiatal hernia. Mild calcific  atherosclerosis present. LUNGS: No suspicious nodule or mass. There is scarring at the right lung apex. No new airspace disease seen. Left lung is clear. AIRWAY: Patent    UPPER ABDOMEN: There is a right adrenal adenoma measuring 13 mm. Left adrenal is  unremarkable. There is a probable cyst the upper pole the right kidney measuring  2.4 cm. Visualized spleen, pancreas and liver are unremarkable. OTHER: No acute or aggressive osseous abnormalities identified. _______________      Impression IMPRESSION:    No acute process. Chronic pleural findings on the right          [x]See my orders for details    My assessment, plan of care, findings, medications, side effects etc were discussed with:  [x]nursing []PT/OT    []respiratory therapy []Dr. Meet Forman [x]Patient     [x]high complex decision making performed and > 50% time spent in face to face assessment.     Rika Rodarte MD

## 2018-10-21 NOTE — PROGRESS NOTES
Shift summary: Pt pleasant and cooperative. Bilateral lungs with scattered wheezing. Dyspneic on exertion. On O2 2L/min NC. Coughing after taking PO meds with water, was able to clear airway after sitting on edge of bed. Encouraged IS use, tolerated well. Denied pain.  Voided by urinal.    Patient Vitals for the past 12 hrs:   Temp Pulse Resp BP SpO2   10/21/18 0449 -- -- -- -- 99 %   10/21/18 0319 98.2 °F (36.8 °C) 81 18 140/73 99 %   10/20/18 2358 -- -- -- -- 99 %   10/20/18 2328 98.2 °F (36.8 °C) 80 18 135/64 99 %   10/20/18 2039 -- -- -- -- 98 %   10/20/18 1934 98 °F (36.7 °C) 79 20 126/69 98 %

## 2018-10-21 NOTE — PROGRESS NOTES
1316-alert and oriented x 4. Lungs coarse, but diminished. BS active x 4 quads. Using urinal, voiding without difficulty. Patient [place], wears hearing aids. 2+ pitting edema to bilateral LE. Denies pain. Shift summary-patient with no complaints through this shift. Tolerated meals. Voiding without difficulty.

## 2018-10-21 NOTE — PROGRESS NOTES
Problem: Falls - Risk of  Goal: *Absence of Falls  Document Darell Fall Risk and appropriate interventions in the flowsheet.   Outcome: Progressing Towards Goal  Fall Risk Interventions:  Mobility Interventions: Communicate number of staff needed for ambulation/transfer         Medication Interventions: Patient to call before getting OOB    Elimination Interventions: Call light in reach, Patient to call for help with toileting needs, Urinal in reach

## 2018-10-21 NOTE — ROUTINE PROCESS
Bedside and Verbal shift change report given to Layla Vieyra (oncoming nurse) by Ana Gonzalez (offgoing nurse). Report included the following information SBAR, Kardex and MAR.

## 2018-10-21 NOTE — ROUTINE PROCESS
Bedside and Verbal shift change report given to [facility], RN (oncoming nurse) by Toby Romero RN   (offgoing nurse). Report included the following information SBAR, Kardex, MAR and Recent Results.

## 2018-10-21 NOTE — PROGRESS NOTES
Problem: Mobility Impaired (Adult and Pediatric)  Goal: *Acute Goals and Plan of Care (Insert Text)  Physical Therapy Goals   Initiated 10/20/2018 and to be accomplished within 3-5 day(s)  1. Patient will move from supine <> sit with S in prep for out of bed activity and change of position. 2.  Patient will perform sit<> stand with S with LRAD in prep for transfers/ambulation. 3.  Patient will transfer from bed <> chair with S with LRAD for time up in chair for completion of ADL activity. 4.  Patient will ambulate 150 feet with LRAD/S for improved functional mobility/safe discharge. 5.  Patient will ascend/descend 3-5 stairs with handrail with contact guard assist for home re-entry as needed. Outcome: Progressing Towards Goal  physical Therapy TREATMENT    Patient: Phi Vick (46 y.o. male)  Date: 10/21/2018  Diagnosis: COPD exacerbation (HCC)  Asbestosis ([facility] [address]  COPD (chronic obstructive pulmonary disease) ([facility] [address] Status asthmaticus with COPD (chronic obstructive pulmonary disease) ([facility] [address]  Precautions: Fall   Chart, physical therapy assessment, plan of care and goals were reviewed. ASSESSMENT:  Progression toward goals:  []      Improving appropriately and progressing toward goals  [x]      Improving slowly and progressing toward goals  []      Not making progress toward goals and plan of care will be adjusted     PLAN:  Patient continues to benefit from skilled intervention to address the above impairments. Continue treatment per established plan of care.   Discharge Recommendations:  Home Health  Further Equipment Recommendations for Discharge:  rolling walker     SUBJECTIVE:   Patient stated  I need to put my hearing aids in     OBJECTIVE DATA SUMMARY:   Critical Behavior:  Neurologic State: Alert, Appropriate for age  Orientation Level: Appropriate for age  Functional Mobility Training:  Transfers:  Sit to Stand: Contact guard assistance  Stand to Sit: Contact guard assistance  Balance:  Sitting: Intact  Standing: Intact; With support  Ambulation/Gait Training:  Distance (ft): 75 Feet (ft)  Assistive Device: Walker, rolling;Gait belt  Ambulation - Level of Assistance: Contact guard assistance  Gait Abnormalities: Decreased step clearance; Step to gait  Base of Support: Narrowed  Speed/Lisbet: Slow  Step Length: Right shortened;Left shortened  Swing Pattern: Left asymmetrical;Right asymmetrical    Pain:  Pain Scale 1: Numeric (0 - 10)  Pain Intensity 1: 0  Activity Tolerance:   Fair       After treatment:   [] Patient left in no apparent distress sitting up in chair  [x] Patient left in no apparent distress in bed(EOB)   [x] Call bell left within reach  [] Nursing notified  [] Caregiver present  [] Bed alarm activated      Ashly Leo PTA   Time Calculation: 23 mins

## 2018-10-22 LAB
ANION GAP SERPL CALC-SCNC: 11 MMOL/L (ref 3–18)
BASOPHILS # BLD: 0 K/UL (ref 0–0.1)
BASOPHILS NFR BLD: 0 % (ref 0–2)
BUN SERPL-MCNC: 31 MG/DL (ref 7–18)
BUN/CREAT SERPL: 23 (ref 12–20)
CALCIUM SERPL-MCNC: 8.5 MG/DL (ref 8.5–10.1)
CHLORIDE SERPL-SCNC: 98 MMOL/L (ref 100–108)
CO2 SERPL-SCNC: 26 MMOL/L (ref 21–32)
CREAT SERPL-MCNC: 1.34 MG/DL (ref 0.6–1.3)
DIFFERENTIAL METHOD BLD: ABNORMAL
EOSINOPHIL # BLD: 0 K/UL (ref 0–0.4)
EOSINOPHIL NFR BLD: 0 % (ref 0–5)
ERYTHROCYTE [DISTWIDTH] IN BLOOD BY AUTOMATED COUNT: 13.1 % (ref 11.6–14.5)
GLUCOSE BLD STRIP.AUTO-MCNC: 131 MG/DL (ref 70–110)
GLUCOSE BLD STRIP.AUTO-MCNC: 152 MG/DL (ref 70–110)
GLUCOSE BLD STRIP.AUTO-MCNC: 160 MG/DL (ref 70–110)
GLUCOSE BLD STRIP.AUTO-MCNC: 172 MG/DL (ref 70–110)
GLUCOSE SERPL-MCNC: 136 MG/DL (ref 74–99)
HCT VFR BLD AUTO: 31.8 % (ref 36–48)
HGB BLD-MCNC: 10.7 G/DL (ref 13–16)
LYMPHOCYTES # BLD: 0.3 K/UL (ref 0.9–3.6)
LYMPHOCYTES NFR BLD: 4 % (ref 21–52)
MAGNESIUM SERPL-MCNC: 1.9 MG/DL (ref 1.6–2.6)
MCH RBC QN AUTO: 29.2 PG (ref 24–34)
MCHC RBC AUTO-ENTMCNC: 33.6 G/DL (ref 31–37)
MCV RBC AUTO: 86.9 FL (ref 74–97)
MONOCYTES # BLD: 0.2 K/UL (ref 0.05–1.2)
MONOCYTES NFR BLD: 3 % (ref 3–10)
NEUTS SEG # BLD: 6.5 K/UL (ref 1.8–8)
NEUTS SEG NFR BLD: 93 % (ref 40–73)
PLATELET # BLD AUTO: 278 K/UL (ref 135–420)
PMV BLD AUTO: 8.9 FL (ref 9.2–11.8)
POTASSIUM SERPL-SCNC: 3 MMOL/L (ref 3.5–5.5)
RBC # BLD AUTO: 3.66 M/UL (ref 4.7–5.5)
SODIUM SERPL-SCNC: 135 MMOL/L (ref 136–145)
WBC # BLD AUTO: 7 K/UL (ref 4.6–13.2)

## 2018-10-22 PROCEDURE — 82962 GLUCOSE BLOOD TEST: CPT

## 2018-10-22 PROCEDURE — 74011000250 HC RX REV CODE- 250: Performed by: HOSPITALIST

## 2018-10-22 PROCEDURE — 94760 N-INVAS EAR/PLS OXIMETRY 1: CPT

## 2018-10-22 PROCEDURE — 74011250636 HC RX REV CODE- 250/636: Performed by: HOSPITALIST

## 2018-10-22 PROCEDURE — 97167 OT EVAL HIGH COMPLEX 60 MIN: CPT

## 2018-10-22 PROCEDURE — 77010033678 HC OXYGEN DAILY

## 2018-10-22 PROCEDURE — 97116 GAIT TRAINING THERAPY: CPT

## 2018-10-22 PROCEDURE — 85025 COMPLETE CBC W/AUTO DIFF WBC: CPT | Performed by: HOSPITALIST

## 2018-10-22 PROCEDURE — 74011636637 HC RX REV CODE- 636/637: Performed by: HOSPITALIST

## 2018-10-22 PROCEDURE — 74011250637 HC RX REV CODE- 250/637: Performed by: HOSPITALIST

## 2018-10-22 PROCEDURE — 74011250637 HC RX REV CODE- 250/637: Performed by: PHYSICIAN ASSISTANT

## 2018-10-22 PROCEDURE — 94640 AIRWAY INHALATION TREATMENT: CPT

## 2018-10-22 PROCEDURE — 36415 COLL VENOUS BLD VENIPUNCTURE: CPT | Performed by: HOSPITALIST

## 2018-10-22 PROCEDURE — 80048 BASIC METABOLIC PNL TOTAL CA: CPT | Performed by: HOSPITALIST

## 2018-10-22 PROCEDURE — 65660000000 HC RM CCU STEPDOWN

## 2018-10-22 PROCEDURE — 74011250636 HC RX REV CODE- 250/636: Performed by: INTERNAL MEDICINE

## 2018-10-22 PROCEDURE — 83735 ASSAY OF MAGNESIUM: CPT | Performed by: HOSPITALIST

## 2018-10-22 PROCEDURE — 97535 SELF CARE MNGMENT TRAINING: CPT

## 2018-10-22 RX ORDER — POTASSIUM CHLORIDE 20 MEQ/1
40 TABLET, EXTENDED RELEASE ORAL 2 TIMES DAILY
Status: COMPLETED | OUTPATIENT
Start: 2018-10-22 | End: 2018-10-22

## 2018-10-22 RX ADMIN — ARFORMOTEROL TARTRATE 15 MCG: 15 SOLUTION RESPIRATORY (INHALATION) at 08:09

## 2018-10-22 RX ADMIN — IPRATROPIUM BROMIDE AND ALBUTEROL SULFATE 3 ML: .5; 3 SOLUTION RESPIRATORY (INHALATION) at 08:08

## 2018-10-22 RX ADMIN — IPRATROPIUM BROMIDE AND ALBUTEROL SULFATE 3 ML: .5; 3 SOLUTION RESPIRATORY (INHALATION) at 04:39

## 2018-10-22 RX ADMIN — POTASSIUM CHLORIDE 40 MEQ: 20 TABLET, EXTENDED RELEASE ORAL at 10:07

## 2018-10-22 RX ADMIN — FAMOTIDINE 20 MG: 20 TABLET ORAL at 10:07

## 2018-10-22 RX ADMIN — IPRATROPIUM BROMIDE AND ALBUTEROL SULFATE 3 ML: .5; 3 SOLUTION RESPIRATORY (INHALATION) at 00:50

## 2018-10-22 RX ADMIN — FLUTICASONE FUROATE 1 PUFF: 100 POWDER RESPIRATORY (INHALATION) at 10:12

## 2018-10-22 RX ADMIN — INSULIN LISPRO 2 UNITS: 100 INJECTION, SOLUTION INTRAVENOUS; SUBCUTANEOUS at 22:10

## 2018-10-22 RX ADMIN — IPRATROPIUM BROMIDE AND ALBUTEROL SULFATE 3 ML: .5; 3 SOLUTION RESPIRATORY (INHALATION) at 20:10

## 2018-10-22 RX ADMIN — IPRATROPIUM BROMIDE AND ALBUTEROL SULFATE 3 ML: .5; 3 SOLUTION RESPIRATORY (INHALATION) at 23:20

## 2018-10-22 RX ADMIN — IPRATROPIUM BROMIDE AND ALBUTEROL SULFATE 3 ML: .5; 3 SOLUTION RESPIRATORY (INHALATION) at 13:03

## 2018-10-22 RX ADMIN — TAMSULOSIN HYDROCHLORIDE 0.4 MG: 0.4 CAPSULE ORAL at 10:07

## 2018-10-22 RX ADMIN — POTASSIUM CHLORIDE 40 MEQ: 20 TABLET, EXTENDED RELEASE ORAL at 22:10

## 2018-10-22 RX ADMIN — BUDESONIDE 500 MCG: 0.5 INHALANT RESPIRATORY (INHALATION) at 08:08

## 2018-10-22 RX ADMIN — METHYLPREDNISOLONE SODIUM SUCCINATE 60 MG: 125 INJECTION, POWDER, FOR SOLUTION INTRAMUSCULAR; INTRAVENOUS at 05:37

## 2018-10-22 RX ADMIN — CHLORTHALIDONE 25 MG: 25 TABLET ORAL at 10:07

## 2018-10-22 RX ADMIN — HEPARIN SODIUM 5000 UNITS: 5000 INJECTION INTRAVENOUS; SUBCUTANEOUS at 22:10

## 2018-10-22 RX ADMIN — GUAIFENESIN 600 MG: 600 TABLET, EXTENDED RELEASE ORAL at 22:10

## 2018-10-22 RX ADMIN — GUAIFENESIN 600 MG: 600 TABLET, EXTENDED RELEASE ORAL at 10:07

## 2018-10-22 RX ADMIN — METHYLPREDNISOLONE SODIUM SUCCINATE 60 MG: 125 INJECTION, POWDER, FOR SOLUTION INTRAMUSCULAR; INTRAVENOUS at 00:31

## 2018-10-22 RX ADMIN — METHYLPREDNISOLONE SODIUM SUCCINATE 20 MG: 40 INJECTION, POWDER, FOR SOLUTION INTRAMUSCULAR; INTRAVENOUS at 22:10

## 2018-10-22 RX ADMIN — HEPARIN SODIUM 5000 UNITS: 5000 INJECTION INTRAVENOUS; SUBCUTANEOUS at 07:23

## 2018-10-22 RX ADMIN — INSULIN LISPRO 2 UNITS: 100 INJECTION, SOLUTION INTRAVENOUS; SUBCUTANEOUS at 07:23

## 2018-10-22 RX ADMIN — BUDESONIDE 500 MCG: 0.5 INHALANT RESPIRATORY (INHALATION) at 20:10

## 2018-10-22 RX ADMIN — FAMOTIDINE 20 MG: 20 TABLET ORAL at 22:10

## 2018-10-22 RX ADMIN — LEVOFLOXACIN 500 MG: 5 INJECTION, SOLUTION INTRAVENOUS at 05:36

## 2018-10-22 RX ADMIN — IPRATROPIUM BROMIDE AND ALBUTEROL SULFATE 3 ML: .5; 3 SOLUTION RESPIRATORY (INHALATION) at 15:53

## 2018-10-22 NOTE — PROGRESS NOTES
Problem: Mobility Impaired (Adult and Pediatric)  Goal: *Acute Goals and Plan of Care (Insert Text)  Physical Therapy Goals   Initiated 10/20/2018 and to be accomplished within 3-5 day(s)  1. Patient will move from supine <> sit with S in prep for out of bed activity and change of position. 2.  Patient will perform sit<> stand with S with LRAD in prep for transfers/ambulation. 3.  Patient will transfer from bed <> chair with S with LRAD for time up in chair for completion of ADL activity. 4.  Patient will ambulate 150 feet with LRAD/S for improved functional mobility/safe discharge. 5.  Patient will ascend/descend 3-5 stairs with handrail with contact guard assist for home re-entry as needed. Outcome: Progressing Towards Goal  physical Therapy TREATMENT    Patient: Sg Lopez (22 y.o. male)  Date: 10/22/2018  Diagnosis: COPD exacerbation ([facility] [address]  Asbestosis ([facility] [address]  COPD (chronic obstructive pulmonary disease) ([facility] [address] Status asthmaticus with COPD (chronic obstructive pulmonary disease) ([facility] [address]  Precautions: Fall   Chart, physical therapy assessment, plan of care and goals were reviewed. ASSESSMENT:  Pt supine in bed on PT arrival.  Noted KUMAR during transition to sit EOB. Pt reports this is better than it was on admission, but not back to usual yet. Also reports getting SOB normally and has been unable to don socks since current illness. Pt able to increase gt distance to 120ft with RW and last 30ft without RW/CGA. O2 sat 97% during ambulation. No LOB, but mildly unsteady without RW. Pt educated in pacing activity and in safety with use of RW. Returned to room and left in bed with all needs in reach. OT/L arrived at completion of session.    Progression toward goals:  [x]      Improving appropriately and progressing toward goals  []      Improving slowly and progressing toward goals  []      Not making progress toward goals and plan of care will be adjusted     PLAN:  Patient continues to benefit from skilled intervention to address the above impairments. Continue treatment per established plan of care. Discharge Recommendations:  Home Health  Further Equipment Recommendations for Discharge:  rolling walker vs SPC     SUBJECTIVE:   Patient stated it's better.     OBJECTIVE DATA SUMMARY:   Critical Behavior:  Neurologic State: Alert  Orientation Level: Oriented X4  Functional Mobility Training:  Bed Mobility:  Supine to Sit: Supervision  Sit to Supine: Supervision  Transfers:  Sit to Stand: Contact guard assistance;Supervision  Stand to Sit: Contact guard assistance  Balance:  Sitting: Intact  Standing: Intact; With support; Without support  Ambulation/Gait Training:  Distance (ft): 150 Feet (ft)  Assistive Device: Gait belt;Walker, rolling  Ambulation - Level of Assistance: Contact guard assistance  Gait Abnormalities: Decreased step clearance  Speed/Lisbet: Pace decreased (<100 feet/min)  Step Length: Left shortened;Right shortened  Pain:  Pain Scale 1: Numeric (0 - 10)  Pain Intensity 1: 0  Activity Tolerance:   Fair   Please refer to the flowsheet for vital signs taken during this treatment. After treatment:   [] Patient left in no apparent distress sitting up in chair  [x] Patient left in no apparent distress in bed  [x] Call bell left within reach  [] Nursing notified  [] Caregiver present  [] Bed alarm activated      Vernon Rousseau PT   Time Calculation: 15 mins     G-Codes (GP)  Mobility  [id number] Current  CI= 1-19%   Goal  CI= 1-19%  The severity rating is based on the functional mobility assessment.

## 2018-10-22 NOTE — PROGRESS NOTES
DC Plan:  Discharge home with MD follow up and family assistance, once medically stable    Met with pt at bedside. No family present. Pt lives with his wife. Pts home address confirmed per face sheet. Pt states either he or his wife will drive to MD appts. Pt sees PCP MD Downey. Pts sees MD Harry Melissa for pulmonology. Pt states he has a follow up appt with pulmonology for 11/2 and 11/8. Pt has a cane and home neb machine. Pt doesn't voice any discharge concerns. PT is recommending home health. FOC offered. Pt is refusing home health at this time. Will place [facility] order. CM will cont to follow.

## 2018-10-22 NOTE — PROGRESS NOTES
Problem: Self Care Deficits Care Plan (Adult)  Goal: *Acute Goals and Plan of Care (Insert Text)  Initial Occupational Therapy Goals (10/22/2018) Within 7 day(s):    1. Patient will perform grooming standing at sink with Supervision/mod I x 1-2 minutes for increased independence with ADLs. 2. Patient will perform UB dressing with setup for increased independence with ADLs. 3. Patient will perform LB dressing with setup & A/E PRN for increased independence with ADLs. 4. Patient will perform all aspects of toileting with Supervision/mod I for increased independence in ADLs  5. Patient will independently apply energy conservation techniques with 1 verbal cue(s) for increased independence with ADLs. 6. Patient will utilize good body mechanics during ADLs with 1 verbal cue(s). Outcome: Progressing Towards Goal  Occupational Therapy EVALUATION    Patient: Lenny Crews (54 y.o. male)  Date: 10/22/2018  Primary Diagnosis: COPD exacerbation (HCC)  Asbestosis ([address]  COPD (chronic obstructive pulmonary disease) ([facility])       Precautions: check O2,  Fall, SKIN (toe nails growing into skin)    ASSESSMENT :  Based on the objective data described below, the patient presents with decreased functional strength, decreased functional balance, decreased overall activity tolerance limiting independence with ADLs. Patient pleasant and cooperative. Pt s/p PT w/ mild KUMAR w/ minimal exertion. Pt agreeable to sitting EOB and contends he has difficulty w/ LE dressing. Pt w/ baseline R hip ROM deficits affecting compensatory strategies and was performing utilizing forward flexion technique. Pt w/ adequate L hip ROM for stool propped technique, but still an effort. Pt instructed on A/E for LE dressing, but will need repetition for ability to use independently. Pt would benefit from continued OT services to maximize independence in ADLs.     Education: Patient instructed on home safety, body mechanics for optimal respiratory effort, Energy Conservation/Work Simplification Techniques, adaptive strategies and adaptive dressing techniques including clothing modifications with patient verbalizing understanding at this time. Patient will benefit from skilled intervention to address the above impairments. Patients rehabilitation potential is considered to be Good  Factors which may influence rehabilitation potential include:   []             None noted  [x]             Mental ability/status  [x]             Medical condition  []             Home/family situation and support systems  []             Safety awareness  []             Pain tolerance/management  []             Other:      PLAN :  Recommendations and Planned Interventions:  [x]               Self Care Training                  [x]        Therapeutic Activities  [x]               Functional Mobility Training    [x]        Cognitive Retraining  [x]               Therapeutic Exercises           [x]        Endurance Activities  [x]               Balance Training                   [x]        Neuromuscular Re-Education  []               Visual/Perceptual Training     [x]   Home Safety Training  [x]               Patient Education                 [x]        Family Training/Education  []               Other (comment):    Frequency/Duration: Patient will be followed by occupational therapy 1-2 times per day/2-4 days per week to address goals. Discharge Recommendations: Home Health  Further Equipment Recommendations for Discharge: shower chair     SUBJECTIVE:   Patient stated My hip is bad, so I don't really have many options.  (re: body mechanics for LE ADLs)    OBJECTIVE DATA SUMMARY:     Past Medical History:   Diagnosis Date    Cancer ([address]     prostate    COPD (chronic obstructive pulmonary disease) ([address]     Hypertension     Pneumonia     Radiation effect      Past Surgical History:   Procedure Laterality Date    HX HERNIA REPAIR       Barriers to Learning/Limitations: yes; physical  Compensate with: visual, verbal, tactile, kinesthetic cues/model    Prior Level of Function/Home Situation: Pt lives w/ supportive wife  Home Situation  Home Environment: Private residence  # Steps to Enter: 6  Rails to Enter: Yes  Hand Rails : Bilateral  One/Two Story Residence: One story  Living Alone: No  Support Systems: Spouse/Significant Other/Partner  Patient Expects to be Discharged to[de-identified] Private residence  Current DME Used/Available at Home: Cloria Po, quad  [x]  Right hand dominant   []  Left hand dominant    Cognitive/Behavioral Status:  Neurologic State: Alert  Orientation Level: Oriented X4  Cognition: Follows commands  Safety/Judgement: Awareness of environment    Skin: dry, but appears intact  Edema: mild B ankle edema    Vision/Perceptual:     appears WFL      Coordination:  Coordination: Within functional limits  Fine Motor Skills-Upper: Left Intact; Right Intact    Gross Motor Skills-Upper: Left Intact; Right Intact    Balance:  Sitting: Intact  Standing: Intact; With support; Without support    Strength:  Strength: Generally decreased, functional  Tone & Sensation:  Tone: Normal  Sensation: Intact  Range of Motion:  AROM: Generally decreased, functional  PROM: Generally decreased, functional    Functional Mobility and Transfers for ADLs:  Bed Mobility:   Supine to Sit: Supervision;Modified independent  Sit to Supine: Supervision  Scooting: Supervision;Modified independent  Transfers:  Sit to Stand: Contact guard assistance;Supervision     ADL Assessment:   Feeding: Setup    Oral Facial Hygiene/Grooming: Setup(seated)    Bathing: Contact guard assistance;Minimum assistance; Additional time    Upper Body Dressing: Setup    Lower Body Dressing: Minimum assistance; Additional time    Toileting: Supervision    ADL Intervention:  Lower Body Dressing Assistance  Socks: Moderate assistance;Maximum assistance;Contact guard assistance; Compensatory technique training(CGA/Catina w/ sock aide & reacher)  Position Performed: Seated edge of bed  Cues: Tactile cues provided;Verbal cues provided;Visual cues provided;Physical assistance  Adaptive Equipment Used: Long handled shoe horn;Reacher;Sock aid     LE Adaptive Equipment:  [x] sock aid  [x] reacher   [x] long handle sponge  [x] long handle shoe horn  [] leg    was issued in order to maximize patient's independence for independent living/decrease caregiver burden/assist with co-morbidities affecting function and body mechanics. Cognitive Retraining  Problem Solving: Inductive reason; Identifying the task; Identifying the problem;General alternative solution;Deductive reason; Awareness of environment  Executive Functions: Executing cognitive plans  Organizing/Sequencing: Breaking task down;Prioritizing  Safety/Judgement: Awareness of environment    Pain:  Pre-treatment: 0/10  Post-treatment: 0/10    Activity Tolerance:   Patient able to complete ADLs with frequent rest breaks. Patient limited by respiratory status/strength/functional endurance/knowledge of compensatory strategies/memory for new learning. Please refer to the flowsheet for vital signs taken during this treatment. After treatment:   [] Patient left in no apparent distress sitting up in chair  [x] Patient left in no apparent distress in bed  [x] Call bell left within reach  [x] Nursing notified/La[place]ha  [] Caregiver present  [] Bed alarm activated    COMMUNICATION/EDUCATION:   [x] Home safety education was provided and the patient/caregiver indicated understanding. [x] Patient/family have participated as able in goal setting and plan of care. [x] Patient/family agree to work toward stated goals and plan of care. [] Patient understands intent and goals of therapy, but is neutral about his/her participation. [] Patient is unable to participate in goal setting and plan of care.     Thank you for this referral.  Brina Delgado, OTR/L  Time Calculation: 24 mins    G-Codes (GP)  Self Care   Current  CK= 40-59%  [id number] Goal  CI= 1-19%  The severity rating is based on the professional judgement & direct observation of Level of Assistance required for Functional Mobility and ADLs. Eval Complexity: History: HIGH Complexity : Extensive review of history including physical, cognitive and psychosocial history ; Examination: HIGH Complexity : 5 or more performance deficits relating to physical, cognitive , or psychosocial skils that result in activity limitations and / or participation restrictions; Decision Making:HIGH Complexity : Patient presents with comorbidities that affect occupational performance.  Signifigant modification of tasks or assistance (eg, physical or verbal) with assessment (s) is necessary to enable patient to complete evaluation

## 2018-10-22 NOTE — ROUTINE PROCESS
Bedside and Verbal shift change report given to [address] (oncoming nurse) by Ermelinda Giang RN (offgoing nurse). Report included the following information SBAR, Kardex, Intake/Output and MAR.

## 2018-10-22 NOTE — PROGRESS NOTES
Hospitalist Progress Note    Patient: Sg Lopez MRN: [id number]  CSN: [id number]    YOB: 1945  Age: 74 y.o. Sex: male    DOA: 10/19/2018 LOS:  LOS: 1 day          Chief Complaint:    SOB    Assessment/Plan     1. COPD Exacerbation  2. HTN  3. CKD3  4. Asbestosis    1. Improved. Patient weaned down to room air maintaining saturations in the upper 90's. Wheezing has improved, weaned steroids to 40mg q12h with anticipation of transitioning to PO steroids tomorrow. Continue nebs and inhalers. Will need continued outpt pulm follow up. 2. BP remains stable, continue chlorthalidone. 3. Stable. Monitor with BMP. 4. Stable. Wean steroids today, continue PT, monitor response. Anticipate dc in 1-2 days. Will need outpt pulm follow up. DVT Prophylaxis - Heparin  Dispo - 1-2 days. Patient Active Problem List   Diagnosis Code    COPD (chronic obstructive pulmonary disease) ([address] J44.9    URIEL (acute [facility] injury) ([facility] N17.9    Hypertension I10    Tobacco abuse Z72.0    Status asthmaticus with COPD (chronic obstructive pulmonary disease) ([facility]) J44.9, J45.902    PVC's (premature ventricular contractions) I49.3    COPD (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 ([facility]) J44.9    COPD with asthma and status asthmaticus ([facility]) J44.9, J45.902    Chronic renal disease, stage 3, moderately decreased glomerular filtration rate between 30-59 mL/min/1.73 square meter ([facility]) N18.3    COPD exacerbation ([facility]) J44.1    Asbestosis ([facility] J61       Subjective:    Feels that he is breathing well. No longer using O2. Hopeful to go home soon.     Review of systems:    Constitutional: denies fevers, chills, myalgias  Respiratory: +/-SOB, -cough  Cardiovascular: denies chest pain, palpitations  Gastrointestinal: denies nausea, vomiting, diarrhea      Vital signs/Intake and Output:  Visit Vitals  /67 (BP 1 Location: Right arm, BP Patient Position: At rest)   Pulse 82   Temp 98 °F (36.7 °C)   Resp 25   Ht 5' 8\" (1.727 m)   Wt 91 kg (200 lb 9.9 oz)   SpO2 98%   BMI 30.50 kg/m²     Current Shift:  10/22 0701 - 10/22 1900  In: 360 [P.O.:360]  Out: -   Last three shifts:  10/20 1901 - 10/22 0700  In: 580 [P.O.:480; I.V.:100]  Out: 2325 [Urine:2325]    Exam:    General: Elderly white male, alert, NAD, OX3  Head/Neck: NCAT, supple, No masses, No lymphadenopathy  CVS:Regular rate and rhythm, no M/R/G, S1/S2 heard, no thrill  Lungs:Clear to auscultation bilaterally, slight end expiratory wheezing, no rhonchi no rales   Abdomen: Soft, Nontender, No distention, Normal Bowel sounds, No hepatomegaly  Extremities: No C/C/E, pulses palpable 2+  Skin:normal texture and turgor, no rashes, no lesions  Neuro:grossly normal , follows commands  Psych:appropriate                Labs: Results:       Chemistry Recent Labs     10/22/18  0306 10/21/18  0416 10/20/18  0520   * 138* 150*   * 134* 131*   K 3.0* 3.5 4.2   CL 98* 98* 96*   CO2 26 25 24   BUN 31* 30* 23*   CREA 1.34* 1.33* 1.36*   CA 8.5 8.6 9.0   AGAP 11 11 11   BUCR 23* 23* 17      CBC w/Diff Recent Labs     10/22/18  0306 10/21/18  0416 10/20/18  0520   WBC 7.0 9.5 11.4   RBC 3.66* 3.63* 3.86*   HGB 10.7* 10.6* 11.3*   HCT 31.8* 31.6* 33.6*    291 295   GRANS 93* 90* 88*   LYMPH 4* 6* 5*   EOS 0 0 0      Cardiac Enzymes No results for input(s): CPK, CKND1, WILLARD in the last 72 hours. No lab exists for component: CKRMB, TROIP   Coagulation No results for input(s): PTP, INR, APTT in the last 72 hours. No lab exists for component: INREXT    Lipid Panel No results found for: CHOL, CHOLPOCT, CHOLX, CHLST, CHOLV, 475597, HDL, LDL, LDLC, DLDLP, 039869, VLDLC, VLDL, TGLX, TRIGL, TRIGP, TGLPOCT, CHHD, CHHDX   BNP No results for input(s): BNPP in the last 72 hours. Liver Enzymes No results for input(s): TP, ALB, TBIL, AP, SGOT, GPT in the last 72 hours.     No lab exists for component: DBIL   Thyroid Studies No results found for: T4, T3U, TSH, TSHEXT     Procedures/imaging: see electronic medical records for all procedures/Xrays and details which were not copied into this note but were reviewed prior to creation of [address] PA-C

## 2018-10-22 NOTE — PROGRESS NOTES
Pulmonary and Sleep Medicine     Pulmonary Progress Note    Name: Toni Park   :    MRN: [id number]   Date: 10/22/2018    [x]I have reviewed the flowsheet and previous days notes. Events, vitals, medications and notes from last 24 hours reviewed. Care plan discussed with nursing and patient. IMPRESSION:   ·   Patient Active Problem List   Diagnosis Code    COPD (chronic obstructive pulmonary disease) ([facility] J44.9    URIEL (acute kidney injury) ([facility] N17.9    Hypertension I10    Tobacco abuse Z72.0    Status asthmaticus with COPD (chronic obstructive pulmonary disease) ([facility]) J44.9, J45.902    PVC's (premature ventricular contractions) I49.3    COPD (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 ([facility]) J44.9    COPD with asthma and status asthmaticus ([facility]) J44.9, J45.902    Chronic renal disease, stage 3, moderately decreased glomerular filtration rate between 30-59 mL/min/1.73 square meter ([facility]) N18.3    COPD exacerbation ([facility]) J44.1    Asbestosis ([facility]) J61 ·    Chronic pleural finding on right side stable on CT chest   PLAN:   On room air, monitor for goal SPO2> 90%  Continue bronchodilators, pulmonary hygiene care  Steroids, taper per clinical course  Antibiotic choice: Levofloxacin x 5 days  Aspiration prevention bundle, head of the bed at 30' all times  Stress ulcer and DVT prophylaxis  Diet as tolerated  Patient follows with my associate Dr. Dunia Biggs on 10/5/18. PFT outpatient basis on 10/3/18  Will defer respective systems problem management to primary and other consultant and follow patient with primary and other team  May DC home tomorrow if continues to do better               Subjective:   Patient feels stable and improved SOB, lying in bed  Improved wheezing. On room air  The patient denies cough, chest pain, or hemoptysis.     ROS:   General ROS: negative for  - fever, chills, weight loss, fatigue and malaise  Endocrine ROS: negative for - skin changes, temperature intolerance or unexpected weight changes  Cardiovascular ROS: negative for - chest pain, edema, murmur, orthopnea, palpitations or paroxysmal nocturnal dyspnea  Gastrointestinal ROS: no abdominal pain, change in bowel habits, or black or bloody stools      Vital Signs:    Blood pressure 137/73, pulse 78, temperature 97.9 °F (36.6 °C), resp. rate 22, height 5' 8\" (1.727 m), weight 91 kg (200 lb 9.9 oz), SpO2 97 %. Body mass index is 30.5 kg/m².     O2 Device: Room air   Temp (24hrs), Av.9 °F (36.6 °C), Min:97.5 °F (36.4 °C), Max:98.2 °F (36.8 °C)       Intake/Output:   Last shift:      10/22 07 - 10/22 1900  In: 600 [P.O.:600]  Out: 425 [Urine:425]  Last 3 shifts: 10/20 1901 - 10/22 07  In: 580 [P.O.:480; I.V.:100]  Out: 2325 [Urine:2325]    Intake/Output Summary (Last 24 hours) at 10/22/2018 1551  Last data filed at 10/22/2018 1400  Gross per 24 hour   Intake 600 ml   Output 1075 ml   Net -475 ml       Physical Exam:   General: AAO x3, cooperative, no distress, appears stated age, on room air  HEENT: PERRLA, EOMI, throat normal without erythema or exudate   Neck: No abnormally enlarged lymph nodes or thyroid, supple  Chest: increased AP diameter  Lungs: moderate air entry and improved wheezes bilaterally, normal percussion bilaterally, no tenderness/ rash  Heart: Regular rate and rhythm, S1S2 present or without murmur or extra heart sounds  Abdomen: protuberant, bowel sounds normoactive, tympanic, abdomen is soft without significant tenderness, masses, organomegaly or guarding  Extremity: negative for edema, cyanosis, clubbing  Skin: Skin color, texture, turgor normal. No rashes or lesions    DATA:   Current Facility-Administered Medications   Medication Dose Route Frequency    potassium chloride (K-DUR, KLOR-CON) SR tablet 40 mEq  40 mEq Oral BID    methylPREDNISolone (PF) (SOLU-MEDROL) injection 40 mg  40 mg IntraVENous Q12H    sodium chloride (NS) flush 5-10 mL  5-10 mL IntraVENous PRN    acetaminophen (TYLENOL) tablet 650 mg  650 mg Oral Q4H PRN    naloxone (NARCAN) injection 0.4 mg  0.4 mg IntraVENous PRN    diphenhydrAMINE (BENADRYL) injection 12.5 mg  12.5 mg IntraVENous Q4H PRN    ondansetron (ZOFRAN) injection 4 mg  4 mg IntraVENous Q4H PRN    heparin (porcine) injection 5,000 Units  5,000 Units SubCUTAneous Q8H    budesonide (PULMICORT) 500 mcg/2 ml nebulizer suspension  500 mcg Nebulization BID RT    arformoterol (BROVANA) neb solution 15 mcg  15 mcg Nebulization BID RT    albuterol-ipratropium (DUO-NEB) 2.5 MG-0.5 MG/3 ML  3 mL Nebulization Q4H RT    guaiFENesin ER (MUCINEX) tablet 600 mg  600 mg Oral Q12H    levoFLOXacin (LEVAQUIN) 500 mg in D5W IVPB  500 mg IntraVENous Q24H    insulin lispro (HUMALOG) injection   SubCUTAneous AC&HS    glucose chewable tablet 16 g  4 Tab Oral PRN    glucagon (GLUCAGEN) injection 1 mg  1 mg IntraMUSCular PRN    dextrose (D50W) injection syrg 12.5-25 g  25-50 mL IntraVENous PRN    chlorthalidone (HYGROTEN) tablet 25 mg  25 mg Oral DAILY    tamsulosin (FLOMAX) capsule 0.4 mg  0.4 mg Oral DAILY    famotidine (PEPCID) tablet 20 mg  20 mg Oral BID    influenza vaccine 2018-19 (6 mos+)(PF) (FLUARIX QUAD/FLULAVAL QUAD) injection 0.5 mL  0.5 mL IntraMUSCular PRIOR TO DISCHARGE    fluticasone furoate (ARNUITY ELLIPTA) 100 mcg/puff  1 Puff Inhalation DAILY    albuterol (PROVENTIL VENTOLIN) nebulizer solution 2.5 mg  2.5 mg Nebulization Q2H PRN                    Labs:  Recent Results (from the past 24 hour(s))   GLUCOSE, POC    Collection Time: 10/21/18  4:00 PM   Result Value Ref Range    Glucose (POC) 153 (H) 70 - 110 mg/dL   GLUCOSE, POC    Collection Time: 10/21/18  9:15 PM   Result Value Ref Range    Glucose (POC) 112 (H) 70 - 776 mg/dL   METABOLIC PANEL, BASIC    Collection Time: 10/22/18  3:06 AM   Result Value Ref Range    Sodium 135 (L) 136 - 145 mmol/L    Potassium 3.0 (L) 3.5 - 5.5 mmol/L    Chloride 98 (L) 100 - 108 mmol/L    CO2 26 21 - 32 mmol/L    Anion gap 11 3.0 - 18 mmol/L    Glucose 136 (H) 74 - 99 mg/dL    BUN 31 (H) 7.0 - 18 MG/DL    Creatinine 1.34 (H) 0.6 - 1.3 MG/DL    BUN/Creatinine ratio 23 (H) 12 - 20      GFR est AA >60 >60 ml/min/1.73m2    GFR est non-AA 52 (L) >60 ml/min/1.73m2    Calcium 8.5 8.5 - 10.1 MG/DL   MAGNESIUM    Collection Time: 10/22/18  3:06 AM   Result Value Ref Range    Magnesium 1.9 1.6 - 2.6 mg/dL   CBC WITH AUTOMATED DIFF    Collection Time: 10/22/18  3:06 AM   Result Value Ref Range    WBC 7.0 4.6 - 13.2 K/uL    RBC 3.66 (L) 4.70 - 5.50 M/uL    HGB 10.7 (L) 13.0 - 16.0 g/dL    HCT 31.8 (L) 36.0 - 48.0 %    MCV 86.9 74.0 - 97.0 FL    MCH 29.2 24.0 - 34.0 PG    MCHC 33.6 31.0 - 37.0 g/dL    RDW 13.1 11.6 - 14.5 %    PLATELET 702 913 - 281 K/uL    MPV 8.9 (L) 9.2 - 11.8 FL    NEUTROPHILS 93 (H) 40 - 73 %    LYMPHOCYTES 4 (L) 21 - 52 %    MONOCYTES 3 3 - 10 %    EOSINOPHILS 0 0 - 5 %    BASOPHILS 0 0 - 2 %    ABS. NEUTROPHILS 6.5 1.8 - 8.0 K/UL    ABS. LYMPHOCYTES 0.3 (L) 0.9 - 3.6 K/UL    ABS. MONOCYTES 0.2 0.05 - 1.2 K/UL    ABS. EOSINOPHILS 0.0 0.0 - 0.4 K/UL    ABS. BASOPHILS 0.0 0.0 - 0.1 K/UL    DF AUTOMATED     GLUCOSE, POC    Collection Time: 10/22/18  6:23 AM   Result Value Ref Range    Glucose (POC) 152 (H) 70 - 110 mg/dL   GLUCOSE, POC    Collection Time: 10/22/18 12:25 PM   Result Value Ref Range    Glucose (POC) 172 (H) 70 - 110 mg/dL           No results for input(s): FIO2I, IFO2, HCO3I, IHCO3, HCOPOC, PCO2I, PCOPOC, IPHI, PHI, PHPOC, PO2I, PO2POC in the last 72 hours. No lab exists for component: IPOC2  All Micro Results     Procedure Component Value Units Date/Time    INFLUENZA A & B AG (RAPID TEST) [[id number]] Collected:  10/19/18 0620    Order Status:  Completed Specimen:  Nasopharyngeal from Nasal washing Updated:  10/19/18 0723     Influenza A Antigen NEGATIVE         Comment: A negative result does not exclude influenza virus infection, seasonal or H1N1 due to suboptimal sensitivity.  If influenza is circulating in your community, a diagnosis of influenza should be considered based on a patients clinical presentation and empiric antiviral treatment should be considered, if indicated. Influenza B Antigen NEGATIVE              Imaging:  [x]I have personally reviewed the patients chest radiographs images and report with the patient    Results from [place] encounter on 10/19/18   XR CHEST PORT    Narrative A portable AP radiograph of the chest was obtained at 0104 hours:  INDICATION:  Shortness of breath. COMPARISON:  Multiple prior studies most recent being 9/8/2018. FINDINGS:      Heart and mediastinum: Unremarkable. Lungs and pleura: Coarse bronchovascular markings are seen with calcified  pleural plaques in the right lung similar to the prior study. No new  consolidation or pleural effusion is identified. Aorta: Unremarkable. Bones: Within normal limits for age. Other: None. Impression Impression:    Chronic changes as described stable since 9/8/2018 without acute process. Pleural calcifications may be related to asbestos exposure. Results from [place] encounter on 10/19/18   CT CHEST WO CONT    Narrative EXAM: CT chest     INDICATION: Dyspnea on exertion    COMPARISON: May 19, 2017    TECHNIQUE: Axial CT imaging from the thoracic inlet through the diaphragm  without intravenous contrast. Multiplanar reformats were generated. One or more  dose reduction techniques were used on this CT: automated exposure control,  adjustment of the mAs and/or kVp according to patient size, and iterative  reconstruction techniques. The specific techniques used on this CT exam have  been documented in the patient's electronic medical record.    _______________    FINDINGS:    LYMPH NODES: There are no enlarged lymph nodes.     PLEURA: There is a complex fluid versus soft tissue density in the right pleural  space with peripheral calcification unchanged from prior exam. This may  represent chronic empyema or old calcified hematoma. There is another  pleural-based calcified lesion more anteriorly in the right hemithorax. Again  may represent chronic empyema or calcified hematoma. No gross interval change  appearance of these calcified lesions. No left pleural effusion seen. HEART: Normal in size without pericardial effusion. Significant calcific  coronary artery disease present. VASCULATURE/MEDIASTINUM: There is a small hiatal hernia. Mild calcific  atherosclerosis present. LUNGS: No suspicious nodule or mass. There is scarring at the right lung apex. No new airspace disease seen. Left lung is clear. AIRWAY: Patent    UPPER ABDOMEN: There is a right adrenal adenoma measuring 13 mm. Left adrenal is  unremarkable. There is a probable cyst the upper pole the right kidney measuring  2.4 cm. Visualized spleen, pancreas and liver are unremarkable. OTHER: No acute or aggressive osseous abnormalities identified. _______________      Impression IMPRESSION:    No acute process. Chronic pleural findings on the right          [x]See my orders for details    My assessment, plan of care, findings, medications, side effects etc were discussed with:  [x]nursing []PT/OT    []respiratory therapy []Dr. Keily Lockett [x]Patient     [x]Moderate complexity decision making performed and > 50% time spent in face to face assessment.     Osorio Mendoza MD

## 2018-10-22 NOTE — PROGRESS NOTES
63:  Assumed care for patient, received bedside report from John Shankar, [address] [place] [address]. Patient resting quietly in bed and has no complaints of pain or discomfort at the time. Whiteboard updated, bed at the lowest position with call bell within reach. Shift uneventful. Bedside and Verbal shift change report given to Lars Ivan RN (oncoming nurse) by Ned Cee RN   (offgoing nurse). Report included the following information SBAR, Kardex, ED Summary, Procedure Summary, Intake/Output, MAR, Med Rec Status, Cardiac Rhythm SR and Alarm Parameters .

## 2018-10-22 NOTE — ROUTINE PROCESS
Bedside and Verbal shift change report given to Any Reyes RN (oncoming nurse) by VINCE Arreguin RN (offgoing nurse). Report included the following information SBAR, Kardex, Intake/Output, MAR and Recent Results.

## 2018-10-23 ENCOUNTER — HOME HEALTH ADMISSION (OUTPATIENT)
Dept: HOME HEALTH SERVICES | Facility: HOME HEALTH | Age: 73
End: 2018-10-23

## 2018-10-23 VITALS
WEIGHT: 203 LBS | RESPIRATION RATE: 20 BRPM | OXYGEN SATURATION: 98 % | HEART RATE: 84 BPM | DIASTOLIC BLOOD PRESSURE: 86 MMHG | TEMPERATURE: 98.2 F | SYSTOLIC BLOOD PRESSURE: 165 MMHG | HEIGHT: 68 IN | BODY MASS INDEX: 30.77 KG/M2

## 2018-10-23 LAB
GLUCOSE BLD STRIP.AUTO-MCNC: 115 MG/DL (ref 70–110)
GLUCOSE BLD STRIP.AUTO-MCNC: 126 MG/DL (ref 70–110)
MAGNESIUM SERPL-MCNC: 1.8 MG/DL (ref 1.6–2.6)

## 2018-10-23 PROCEDURE — 90686 IIV4 VACC NO PRSV 0.5 ML IM: CPT | Performed by: HOSPITALIST

## 2018-10-23 PROCEDURE — 83735 ASSAY OF MAGNESIUM: CPT | Performed by: HOSPITALIST

## 2018-10-23 PROCEDURE — 74011250636 HC RX REV CODE- 250/636: Performed by: INTERNAL MEDICINE

## 2018-10-23 PROCEDURE — 94640 AIRWAY INHALATION TREATMENT: CPT

## 2018-10-23 PROCEDURE — 82962 GLUCOSE BLOOD TEST: CPT

## 2018-10-23 PROCEDURE — 74011000250 HC RX REV CODE- 250: Performed by: HOSPITALIST

## 2018-10-23 PROCEDURE — 97116 GAIT TRAINING THERAPY: CPT

## 2018-10-23 PROCEDURE — 36415 COLL VENOUS BLD VENIPUNCTURE: CPT | Performed by: HOSPITALIST

## 2018-10-23 PROCEDURE — 94760 N-INVAS EAR/PLS OXIMETRY 1: CPT

## 2018-10-23 PROCEDURE — 74011250636 HC RX REV CODE- 250/636: Performed by: HOSPITALIST

## 2018-10-23 PROCEDURE — 90471 IMMUNIZATION ADMIN: CPT

## 2018-10-23 PROCEDURE — 74011250637 HC RX REV CODE- 250/637: Performed by: HOSPITALIST

## 2018-10-23 RX ORDER — PREDNISONE 10 MG/1
TABLET ORAL
Qty: 20 TAB | Refills: 0 | Status: SHIPPED | OUTPATIENT
Start: 2018-10-23 | End: 2019-02-08

## 2018-10-23 RX ORDER — LEVOFLOXACIN 500 MG/1
500 TABLET, FILM COATED ORAL DAILY
Qty: 5 TAB | Refills: 0 | Status: SHIPPED | OUTPATIENT
Start: 2018-10-23 | End: 2019-02-08

## 2018-10-23 RX ADMIN — IPRATROPIUM BROMIDE AND ALBUTEROL SULFATE 3 ML: .5; 3 SOLUTION RESPIRATORY (INHALATION) at 11:25

## 2018-10-23 RX ADMIN — TAMSULOSIN HYDROCHLORIDE 0.4 MG: 0.4 CAPSULE ORAL at 09:31

## 2018-10-23 RX ADMIN — LEVOFLOXACIN 500 MG: 5 INJECTION, SOLUTION INTRAVENOUS at 05:11

## 2018-10-23 RX ADMIN — HEPARIN SODIUM 5000 UNITS: 5000 INJECTION INTRAVENOUS; SUBCUTANEOUS at 06:32

## 2018-10-23 RX ADMIN — IPRATROPIUM BROMIDE AND ALBUTEROL SULFATE 3 ML: .5; 3 SOLUTION RESPIRATORY (INHALATION) at 07:19

## 2018-10-23 RX ADMIN — FAMOTIDINE 20 MG: 20 TABLET ORAL at 09:31

## 2018-10-23 RX ADMIN — METHYLPREDNISOLONE SODIUM SUCCINATE 20 MG: 40 INJECTION, POWDER, FOR SOLUTION INTRAMUSCULAR; INTRAVENOUS at 09:32

## 2018-10-23 RX ADMIN — INFLUENZA VIRUS VACCINE 0.5 ML: 15; 15; 15; 15 SUSPENSION INTRAMUSCULAR at 13:49

## 2018-10-23 RX ADMIN — CHLORTHALIDONE 25 MG: 25 TABLET ORAL at 09:31

## 2018-10-23 RX ADMIN — ARFORMOTEROL TARTRATE 15 MCG: 15 SOLUTION RESPIRATORY (INHALATION) at 07:19

## 2018-10-23 RX ADMIN — FLUTICASONE FUROATE 1 PUFF: 100 POWDER RESPIRATORY (INHALATION) at 09:45

## 2018-10-23 RX ADMIN — BUDESONIDE 500 MCG: 0.5 INHALANT RESPIRATORY (INHALATION) at 07:19

## 2018-10-23 RX ADMIN — GUAIFENESIN 600 MG: 600 TABLET, EXTENDED RELEASE ORAL at 09:31

## 2018-10-23 NOTE — DISCHARGE SUMMARY
Discharge Summary    Patient: Fred Quezada MRN: [id number]  CSN: [id number]    YOB: 1945  Age: 74 y.o.   Sex: male    DOA: 10/19/2018 LOS:  LOS: 2 days   Discharge Date:      Primary Care Provider:  Yoel Doran MD    Admission Diagnoses: COPD exacerbation ([facility]  Asbestosis ([facility]  COPD (chronic obstructive pulmonary disease) ([facility]    Discharge Diagnoses:    Problem List as of 10/23/2018 Date Reviewed: 4/20/2018          Codes Class Noted - Resolved    COPD exacerbation ([facility] ICD-10-CM: J44.1  ICD-9-CM: 491.21  10/19/2018 - Present        Asbestosis ([facility] ICD-10-CM: F55  ICD-9-CM: 501  10/19/2018 - Present        COPD with asthma and status asthmaticus ([facility] ICD-10-CM: J44.9, J45.902  ICD-9-CM: 493.21  7/20/2018 - Present        Chronic renal disease, stage 3, moderately decreased glomerular filtration rate between 30-59 mL/min/1.73 square meter ([facility] ICD-10-CM: N18.3  ICD-9-CM: 585.3  7/20/2018 - Present        * (Principal) Status asthmaticus with COPD (chronic obstructive pulmonary disease) ([facility] ICD-10-CM: J44.9, J45.902  ICD-9-CM: 493.21  4/20/2018 - Present        PVC's (premature ventricular contractions) ICD-10-CM: I49.3  ICD-9-CM: 427.69  4/20/2018 - Present        COPD (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 ([facility] ICD-10-CM: J44.9  ICD-9-CM: 491.20  4/20/2018 - Present        Tobacco abuse (Chronic) ICD-10-CM: Z72.0  ICD-9-CM: 305.1  3/22/2017 - Present        COPD (chronic obstructive pulmonary disease) ([facility] ICD-10-CM: J44.9  ICD-9-CM: 496  10/2/2014 - Present        URIEL (acute kidney injury) ([facility] ICD-10-CM: N17.9  ICD-9-CM: 584.9  10/2/2014 - Present        Hypertension ICD-10-CM: I10  ICD-9-CM: 401.9  Unknown - Present        RESOLVED: Acute respiratory failure ([facility] ICD-10-CM: J96.00  ICD-9-CM: 518.81  10/2/2014 - 3/18/2017        RESOLVED: Pneumonia ICD-10-CM: J18.9  ICD-9-CM: 172  10/2/2014 - 3/18/2017              Discharge Medications:     Current Discharge Medication List      START taking these medications    Details   fluticasone furoate (ARNUITY ELLIPTA) 100 mcg/actuation dsdv inhaler Take 1 Puff by inhalation daily. Qty: 1 Inhaler, Refills: 0      predniSONE (DELTASONE) 10 mg tablet Days 1 & 2: Take FOUR tabs. Days 3 & 4: Take THREE tabs. Days 5 & 6: Take TWO tabs. Days 7 & 8: Take ONE tab. Qty: 20 Tab, Refills: 0      levoFLOXacin (LEVAQUIN) 500 mg tablet Take 1 Tab by mouth daily. Qty: 5 Tab, Refills: 0         CONTINUE these medications which have NOT CHANGED    Details   acetaminophen (TYLENOL ARTHRITIS PAIN) 650 mg TbER Take 1 Tab by mouth every eight (8) hours. Qty: 15 Tab, Refills: 0      lidocaine (XYLOCAINE) 4 % topical cream Apply  to affected area three (3) times daily as needed for Pain. Qty: 15 g, Refills: 0      furosemide (LASIX) 20 mg tablet Take 20 mg by mouth daily. albuterol (PROVENTIL HFA, VENTOLIN HFA, PROAIR HFA) 90 mcg/actuation inhaler Take 2 Puffs by inhalation every four (4) hours as needed for Wheezing or Shortness of Breath. Qty: 1 Inhaler, Refills: 1      ipratropium (ATROVENT) 0.03 % nasal spray 2 Sprays every twelve (12) hours. tamsulosin (FLOMAX) 0.4 mg capsule Take 0.4 mg by mouth daily. albuterol (PROVENTIL VENTOLIN) 2.5 mg /3 mL (0.083 %) nebulizer solution 3 mL by Nebulization route every four (4) hours as needed for Wheezing. Qty: 24 Each, Refills: 0      chlorthalidone (HYGROTEN) 25 mg tablet Take  by mouth daily.       Nebulizer & Compressor machine Dispense: 1 nebulizer machine w all tubing necessary  Qty: 1 each, Refills: 0         STOP taking these medications       predniSONE (STERAPRED) 5 mg dose pack Comments:   Reason for Stopping:         budesonide-formoterol (SYMBICORT) 160-4.5 mcg/actuation HFA inhaler Comments:   Reason for Stopping:               Discharge Condition: Stable    Procedures : None    Consults: Pulmonary/Critical Care      PHYSICAL EXAM    Visit Vitals  /69 (BP 1 Location: Right arm, BP Patient Position: At rest;Supine)   Pulse 67   Temp 98.8 °F (37.1 °C)   Resp 20   Ht 5' 8\" (1.727 m)   Wt 92.1 kg (203 lb)   SpO2 98%   BMI 30.87 kg/m²     General: Elderly, white male. Awake, cooperative, no acute distress    HEENT: NC, Atraumatic. PERRLA, EOMI. Anicteric sclerae. Lungs:  CTA Bilaterally. Mild end expiratory wheezing. No Rhonchi/Rales. Heart:  Regular  rhythm,  No murmur, No Rubs, No Gallops  Abdomen: Soft, Non distended, Non tender. +Bowel sounds,   Extremities: No c/c/e  Psych:   Not anxious or agitated. Neurologic:  No acute neurological deficits. Admission HPI : Chiki Garzon is a 74 y.o. male who hx of copd, htn, asbestosis came to ER due to sob/wheezing worsening yesterday. He has been sob /wheezing and using neubulizer for self treatment, bit no improving. His wheezing/sob/cough worsening yesterday . Coughs with greenish sputum. Ct chest Chronic dystrophic pleural-based masslike fluid collections with   heterogeneous complex internal attenuation suggesting sequela of a chronic  granulomatous process. 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 He received breathing treatment in ER, but sob and wheezing not improving much      Denies any slurred speech/headache/cp/n/v/blurred vission/d/c/palpitation/gait change/bleeding. Denies smoking/ any alcohol or drug use. He is former smoker     Hospital Course : This patient was admitted to medical services on October 19, 2018 for a COPD exacerbation. Other admission diagnoses include asbestosis, hypertension, and CKD3. COPD Exacerbation  The patient was admitted to the telemetry unit for further care. He was started on supplemental oxygen, IV solumedrol, IV levaquin, and inhalers/neb treatments. Over the course of the patient's hospital stay, he was weaned off of supplemental oxygen successfully.  He completed physical therapy sessions without the use of supplemental oxygen as well. He was slowly weaned off of IV steroids, and will be given a PO steroid taper at the time of discharge. He will also be given a course of PO antibiotics to continue at the time of discharge. He was advised to continue his prescribed inhalers and neb treatments. He was advised to follow up with pulmonology at his scheduled appointment times in November with Dr Amber Pelletier. Pulmonology was consulted for assistance in management, CT findings suggestive of chronic empyema, as well as his asbestosis. The patient's hypertension was managed with his usual chlorthalidone medication upon admission. His BP remained stable over the course of his hospital stay. The patient's CKD was monitored daily with BMP and remained stable. His medications were renally dosed. He worked with physical therapy during his hospital course, and their recommendations at the time of discharge are for home health care. The patient has home health care arranged by case management and will be discharged today in stable condition. Activity: PT/OT per Home Health    Diet: Regular Diet    Follow-up: PCP; Pulmonology    Disposition: Home Health    Minutes spent on discharge: 40       Labs: Results:       Chemistry Recent Labs     10/22/18  0306 10/21/18  0416   * 138*   * 134*   K 3.0* 3.5   CL 98* 98*   CO2 26 25   BUN 31* 30*   CREA 1.34* 1.33*   CA 8.5 8.6   AGAP 11 11   BUCR 23* 23*      CBC w/Diff Recent Labs     10/22/18  0306 10/21/18  0416   WBC 7.0 9.5   RBC 3.66* 3.63*   HGB 10.7* 10.6*   HCT 31.8* 31.6*    291   GRANS 93* 90*   LYMPH 4* 6*   EOS 0 0      Cardiac Enzymes No results for input(s): CPK, CKND1, WILLARD in the last 72 hours. No lab exists for component: CKRMB, TROIP   Coagulation No results for input(s): PTP, INR, APTT in the last 72 hours.     No lab exists for component: INREXT    Lipid Panel No results found for: CHOL, CHOLPOCT, CHOLX, CHLST, CHOLV, 984908, HDL, LDL, LDLC, DLDLP, 836872, VLDLC, VLDL, TGLX, TRIGL, TRIGP, TGLPOCT, CHHD, CHHDX   BNP No results for input(s): BNPP in the last 72 hours. Liver Enzymes No results for input(s): TP, ALB, TBIL, AP, SGOT, GPT in the last 72 hours. No lab exists for component: DBIL   Thyroid Studies No results found for: T4, T3U, TSH, TSHEXT         Significant Diagnostic Studies: Ct Chest Wo Cont    Result Date: 10/20/2018  EXAM: CT chest INDICATION: Dyspnea on exertion COMPARISON: May 19, 2017 TECHNIQUE: Axial CT imaging from the thoracic inlet through the diaphragm without intravenous contrast. Multiplanar reformats were generated. One or more dose reduction techniques were used on this CT: automated exposure control, adjustment of the mAs and/or kVp according to patient size, and iterative reconstruction techniques. The specific techniques used on this CT exam have been documented in the patient's electronic medical record. _______________ FINDINGS: LYMPH NODES: There are no enlarged lymph nodes. PLEURA: There is a complex fluid versus soft tissue density in the right pleural space with peripheral calcification unchanged from prior exam. This may represent chronic empyema or old calcified hematoma. There is another pleural-based calcified lesion more anteriorly in the right hemithorax. Again may represent chronic empyema or calcified hematoma. No gross interval change appearance of these calcified lesions. No left pleural effusion seen. HEART: Normal in size without pericardial effusion. Significant calcific coronary artery disease present. VASCULATURE/MEDIASTINUM: There is a small hiatal hernia. Mild calcific atherosclerosis present. LUNGS: No suspicious nodule or mass. There is scarring at the right lung apex. No new airspace disease seen. Left lung is clear. AIRWAY: Patent UPPER ABDOMEN: There is a right adrenal adenoma measuring 13 mm. Left adrenal is unremarkable. There is a probable cyst the upper pole the right kidney measuring 2.4 cm.  Visualized spleen, pancreas and liver are unremarkable. OTHER: No acute or aggressive osseous abnormalities identified. _______________     IMPRESSION: No acute process. Chronic pleural findings on the right     Xr Chest Port    Result Date: 10/19/2018  A portable AP radiograph of the chest was obtained at 0104 hours: INDICATION:  Shortness of breath. COMPARISON:  Multiple prior studies most recent being 9/8/2018. FINDINGS:  Heart and mediastinum: Unremarkable. Lungs and pleura: Coarse bronchovascular markings are seen with calcified pleural plaques in the right lung similar to the prior study. No new consolidation or pleural effusion is identified. Aorta: Unremarkable. Bones: Within normal limits for age. Other: None. Impression: Chronic changes as described stable since 9/8/2018 without acute process. Pleural calcifications may be related to asbestos exposure. No results found for this or any previous visit.         CC: Chirag Contreras MD

## 2018-10-23 NOTE — ROUTINE PROCESS
0730: Bedside and Verbal shift change report given to Cheyenne  (oncoming nurse) by Fatemeh Baeza RN   (offgoing nurse). Report included the following information SBAR, Kardex, Intake/Output, MAR, Recent Results and Cardiac Rhythm NSR.

## 2018-10-23 NOTE — PROGRESS NOTES
D/C Plan: [address] 10/23/18    Noted pt is to be discharged today with [facility]. Pt's family will transport pt home today. [facility] has been made aware of plan discharge today. No other transition of care needs have been identified. Care Management Interventions  PCP Verified by CM: Yes  Mode of Transport at Discharge:  Other (see comment)(Familyu)  Transition of Care Consult (CM Consult): [address]: Yes  Health Maintenance Reviewed: Yes  Physical Therapy Consult: Yes  Occupational Therapy Consult: Yes  Current Support Network: Lives with Spouse  Confirm Follow Up Transport: Family  Plan discussed with Pt/Family/Caregiver: Yes  Freedom of Choice Offered: Yes  Discharge Location  Discharge Placement: Home with home health([facility])

## 2018-10-23 NOTE — PROGRESS NOTES
Problem: Mobility Impaired (Adult and Pediatric)  Goal: *Acute Goals and Plan of Care (Insert Text)  Physical Therapy Goals   Initiated 10/20/2018 and to be accomplished within 3-5 day(s)  1. Patient will move from supine <> sit with S in prep for out of bed activity and change of position. 2.  Patient will perform sit<> stand with S with LRAD in prep for transfers/ambulation. 3.  Patient will transfer from bed <> chair with S with LRAD for time up in chair for completion of ADL activity. 4.  Patient will ambulate 150 feet with LRAD/S for improved functional mobility/safe discharge. 5.  Patient will ascend/descend 3-5 stairs with handrail with contact guard assist for home re-entry as needed. Outcome: Resolved/Met Date Met: 10/23/18  physical Therapy TREATMENT/DISCHARGE    Patient: Magdalene Velazquez (49 y.o. male)  Date: 10/23/2018  Diagnosis: COPD exacerbation (HCC)  Asbestosis ([address]  COPD (chronic obstructive pulmonary disease) ([facility] Status asthmaticus with COPD (chronic obstructive pulmonary disease) ([facility]      Precautions: Fall  Chart, physical therapy assessment, plan of care and goals were reviewed. ASSESSMENT:  Pt meets needs for safe home mobility and is cleared from this level of PT. Francie Otto suggested for improved balance and stability of gait. Progression toward goals:  [x]      Goals met  []      Improving appropriately and progressing toward goals  []      Improving slowly and progressing toward goals  []      Not making progress toward goals and plan of care will be adjusted     PLAN:  Patient will be discharged from physical therapy at this time. Rationale for discharge:  [x] Goals Achieved  [] [address]  [] Patient not participating in therapy  [] Other:  Discharge Recommendations:  Home Health  Further Equipment Recommendations for Discharge:  rolling walker     SUBJECTIVE:   Patient stated I want to go hoe today.     OBJECTIVE DATA SUMMARY:   Critical Behavior:  Neurologic State: Alert  Orientation Level: Oriented X4  Cognition: Appropriate decision making, Appropriate for age attention/concentration, Appropriate safety awareness, Follows commands, Recognition of people/places  Safety/Judgement: Awareness of environment  Functional Mobility Training:  Bed Mobility:  Rolling: Modified independent  Supine to Sit: Modified independent  Sit to Supine: Modified independent  Scooting: Modified independent  Transfers:  Sit to Stand: Modified independent  Stand to Sit: Modified independent  Stand Pivot Transfers: Modified independent  Balance:  Sitting: Intact  Standing: Intact; With support  Ambulation/Gait Training:  Distance (ft): 550 Feet (ft)  Assistive Device: Gait belt;Walker, rolling  Ambulation - Level of Assistance: Supervision  Gait Abnormalities: Decreased step clearance  Base of Support: Narrowed  Stance: Weight shift  Step Length: Right shortened;Left shortened  Swing Pattern: Right symmetrical;Left symmetrical  Stairs:  Number of Stairs Trained: 6  Stairs - Level of Assistance: Supervision   Rail Use: Right   Pain:  Pain Scale 1: Numeric (0 - 10)  Pain Intensity 1: 0  Activity Tolerance:   Good  Please refer to the flowsheet for vital signs taken during this treatment.   After treatment:   [] Patient left in no apparent distress sitting up in chair  [x] Patient left in no apparent distress in bed  [x] Call bell left within reach  [x] Nursing notified  [] Caregiver present  [] Bed alarm activated  Linnea Fothergill, PTA   Time Calculation: 23 mins

## 2018-10-23 NOTE — PROGRESS NOTES
Problem: Falls - Risk of  Goal: *Absence of Falls  Document Darell Fall Risk and appropriate interventions in the flowsheet.   Outcome: Progressing Towards Goal  Fall Risk Interventions:  Mobility Interventions: Assess mobility with egress test, Bed/chair exit alarm, Communicate number of staff needed for ambulation/transfer, Utilize walker, cane, or other assistive device    Mentation Interventions: Adequate sleep, hydration, pain control, Bed/chair exit alarm, Door open when patient unattended    Medication Interventions: Patient to call before getting OOB, Teach patient to arise slowly    Elimination Interventions: Patient to call for help with toileting needs, Toilet paper/wipes in reach, Toileting schedule/hourly rounds, Bed/chair exit alarm, Call light in reach

## 2018-10-23 NOTE — PROGRESS NOTES
1900: Received report from River [facility]. White board updated. Pt is alert and oriented x4. Pt currently does not report any pain or respiratory distress on room air. Pt's questions and concerns addressed at this time. Will continue to monitor. 0000: Reassessment completed, no changes compared to initial assessment, pt does not report any pain or discomfort at this time. Bed in lowest position, and call bell within reach. Will continue to monitor. 0400: Pt sleeping comfortably, will continue to round hourly on pt.

## 2018-10-23 NOTE — DISCHARGE INSTRUCTIONS
Chronic Obstructive Pulmonary Disease (COPD): Care Instructions  Your Care Instructions    Chronic obstructive pulmonary disease (COPD) is a general term for a group of lung diseases, including emphysema and chronic bronchitis. People with COPD have decreased airflow in and out of the lungs, which makes it hard to breathe. The airways also can get clogged with thick mucus. Cigarette smoking is a major cause of COPD. Although there is no cure for COPD, you can slow its progress. Following your treatment plan and taking care of yourself can help you feel better and live longer. Follow-up care is a key part of your treatment and safety. Be sure to make and go to all appointments, and call your doctor if you are having problems. It's also a good idea to know your test results and keep a list of the medicines you take. How can you care for yourself at home?   Staying healthy    · Do not smoke. This is the most important step you can take to prevent more damage to your lungs. If you need help quitting, talk to your doctor about stop-smoking programs and medicines. These can increase your chances of quitting for good.     · Avoid colds and flu. Get a pneumococcal vaccine shot. If you have had one before, ask your doctor whether you need a second dose. Get the flu vaccine every fall. If you must be around people with colds or the flu, wash your hands often.     · Avoid secondhand smoke, air pollution, and high altitudes. Also avoid cold, dry air and hot, humid air. Stay at home with your windows closed when air pollution is bad.    Medicines and oxygen therapy    · Take your medicines exactly as prescribed. Call your doctor if you think you are having a problem with your medicine.     · You may be taking medicines such as:  ? Bronchodilators. These help open your airways and make breathing easier. Bronchodilators are either short-acting (work for 6 to 9 hours) or long-acting (work for 24 hours).  You inhale most bronchodilators, so they start to act quickly. Always carry your quick-relief inhaler with you in case you need it while you are away from home. ? Corticosteroids (prednisone, budesonide). These reduce airway inflammation. They come in pill or inhaled form. You must take these medicines every day for them to work well.     · A spacer may help you get more inhaled medicine to your lungs. Ask your doctor or pharmacist if a spacer is right for you. If it is, ask how to use it properly.     · Do not take any vitamins, over-the-counter medicine, or herbal products without talking to your doctor first.     · If your doctor prescribed antibiotics, take them as directed. Do not stop taking them just because you feel better. You need to take the full course of antibiotics.     · Oxygen therapy boosts the amount of oxygen in your blood and helps you breathe easier. Use the flow rate your doctor has recommended, and do not change it without talking to your doctor first.   Activity    · Get regular exercise. Walking is an easy way to get exercise. Start out slowly, and walk a little more each day.     · Pay attention to your breathing. You are exercising too hard if you cannot talk while you are exercising.     · Take short rest breaks when doing household chores and other activities.     · Learn breathing methods--such as breathing through pursed lips--to help you become less short of breath.     · If your doctor has not set you up with a pulmonary rehabilitation program, talk to him or her about whether rehab is right for you. Rehab includes exercise programs, education about your disease and how to manage it, help with diet and other changes, and emotional support. Diet    · Eat regular, healthy meals. Use bronchodilators about 1 hour before you eat to make it easier to eat. Eat several small meals instead of three large ones.  Drink beverages at the end of the meal. Avoid foods that are hard to chew.     · Eat foods that contain protein so that you do not lose muscle mass.     · Talk with your doctor if you gain too much weight or if you lose weight without trying.    Mental health    · Talk to your family, friends, or a therapist about your feelings. It is normal to feel frightened, angry, hopeless, helpless, and even guilty. Talking openly about bad feelings can help you cope. If these feelings last, talk to your doctor. When should you call for help? Call 911 anytime you think you may need emergency care. For example, call if:    · You have severe trouble breathing.    Call your doctor now or seek immediate medical care if:    · You have new or worse trouble breathing.     · You cough up blood.     · You have a fever.    Watch closely for changes in your health, and be sure to contact your doctor if:    · You cough more deeply or more often, especially if you notice more mucus or a change in the color of your mucus.     · You have new or worse swelling in your legs or belly.     · You are not getting better as expected. Where can you learn more? Go to http://ifeoma-jana.info/. Marissa Blair in the search box to learn more about \"Chronic Obstructive Pulmonary Disease (COPD): Care Instructions. \"  Current as of: December 6, 2017  Content Version: 11.8  © 6482-2081 [employer]. Care instructions adapted under license by [employer] (which disclaims liability or warranty for this information). If you have questions about a medical condition or this instruction, always ask your healthcare professional. [place] [address] any warranty or liability for your use of this information. Chronic Obstructive Pulmonary Disease (COPD) Flare-Ups: Care Instructions  Your Care Instructions    Chronic obstructive pulmonary disease (COPD) is a lung disease that makes it hard to breathe. It is caused by damage to the lungs over many years, usually from smoking.   COPD is often a mix of two diseases:  · Chronic bronchitis: The airways that carry air to the lungs (bronchial tubes) get inflamed and make a lot of mucus. This can narrow or block the airways. · Emphysema: In a healthy person, the tiny air sacs in the lungs are like balloons. As you breathe in and out, they get bigger and smaller to move air through your lungs. But with emphysema, these air sacs are damaged and lose their stretch. Less air gets in and out of the lungs. Many people with COPD have attacks called flare-ups or exacerbations. This is when your usual symptoms quickly get worse and stay worse. The doctor has checked you carefully. But problems can develop later. If you notice any problems or new symptoms, get medical treatment right away. Follow-up care is a key part of your treatment and safety. Be sure to make and go to all appointments, and call your doctor if you are having problems. It's also a good idea to know your test results and keep a list of the medicines you take. How can you care for yourself at home? · Be safe with medicines. Take your medicines exactly as prescribed. Call your doctor if you think you are having a problem with your medicine. You may be taking medicines such as:  ? Bronchodilators. These help open your airways and make breathing easier. ? Corticosteroids. These reduce airway inflammation. They may be given as pills, in a vein, or in an inhaled form. You may go home with pills in addition to an inhaler that you already use. · A spacer may help you get more inhaled medicine to your lungs. Ask your doctor or pharmacist if a spacer is right for you. If it is, ask how to use it properly. · If your doctor prescribed antibiotics, take them as directed. Do not stop taking them just because you feel better. You need to take the full course of antibiotics. · If your doctor prescribed oxygen, use the flow rate your doctor has recommended.  Do not change it without talking to your doctor first.  · Do not smoke. Smoking makes COPD worse. If you need help quitting, talk to your doctor about stop-smoking programs and medicines. These can increase your chances of quitting for good. When should you call for help? Call 911 anytime you think you may need emergency care. For example, call if:    · You have severe trouble breathing.    Call your doctor now or seek immediate medical care if:    · You have new or worse trouble breathing.     · Your coughing or wheezing gets worse.     · You cough up dark brown or bloody mucus (sputum).     · You have a new or higher fever.    Watch closely for changes in your health, and be sure to contact your doctor if:    · You notice more mucus or a change in the color of your mucus.     · You need to use your antibiotic or steroid pills.     · You do not get better as expected. Where can you learn more? Go to http://ifeoma-jana.info/. Enter T419 in the search box to learn more about \"Chronic Obstructive Pulmonary Disease (COPD) Flare-Ups: Care Instructions. \"  Current as of: December 6, 2017  Content Version: 11.8  © 6887-3679 [employer]. Care instructions adapted under license by [employer] (which disclaims liability or warranty for this information). If you have questions about a medical condition or this instruction, always ask your healthcare professional. [place] [address] any warranty or liability for your use of this information. DISCHARGE SUMMARY from Nurse    PATIENT INSTRUCTIONS:    After general anesthesia or intravenous sedation, for 24 hours or while taking prescription Narcotics:  · Limit your activities  · Do not drive and operate hazardous machinery  · Do not make important personal or business decisions  · Do  not drink alcoholic beverages  · If you have not urinated within 8 hours after discharge, please contact your surgeon on call.     Report the following to your surgeon:  · Excessive pain, swelling, redness or odor of or around the surgical area  · Temperature over 100.5  · Nausea and vomiting lasting longer than 4 hours or if unable to take medications  · Any signs of decreased circulation or nerve impairment to extremity: change in color, persistent  numbness, tingling, coldness or increase pain  · Any questions    What to do at Home:  Recommended activity: Activity as tolerated. If you experience any of the following symptoms new or worsening symptoms, please follow up with your primary care provider. *  Please give a list of your current medications to your Primary Care Provider. *  Please update this list whenever your medications are discontinued, doses are      changed, or new medications (including over-the-counter products) are added. *  Please carry medication information at all times in case of emergency situations. These are general instructions for a healthy lifestyle:    No smoking/ No tobacco products/ Avoid exposure to second hand smoke  Surgeon General's Warning:  Quitting smoking now greatly reduces serious risk to your health. Obesity, smoking, and sedentary lifestyle greatly increases your risk for illness    A healthy diet, regular physical exercise & weight monitoring are important for maintaining a healthy lifestyle    You may be retaining fluid if you have a history of heart failure or if you experience any of the following symptoms:  Weight gain of 3 pounds or more overnight or 5 pounds in a week, increased swelling in our hands or feet or shortness of breath while lying flat in bed. Please call your doctor as soon as you notice any of these symptoms; do not wait until your next office visit.     Recognize signs and symptoms of STROKE:    F-face looks uneven    A-arms unable to move or move unevenly    S-speech slurred or non-existent    T-time-call 911 as soon as signs and symptoms begin-DO NOT go       Back to bed or wait to see if you get better-TIME IS BRAIN. Warning Signs of HEART ATTACK     Call 911 if you have these symptoms:   Chest discomfort. Most heart attacks involve discomfort in the center of the chest that lasts more than a few minutes, or that goes away and comes back. It can feel like uncomfortable pressure, squeezing, fullness, or pain.  Discomfort in other areas of the upper body. Symptoms can include pain or discomfort in one or both arms, the back, neck, jaw, or stomach.  Shortness of breath with or without chest discomfort.  Other signs may include breaking out in a cold sweat, nausea, or lightheadedness. Don't wait more than five minutes to call 911 - MINUTES MATTER! Fast action can save your life. Calling 911 is almost always the fastest way to get lifesaving treatment. Emergency Medical Services staff can begin treatment when they arrive -- up to an hour sooner than if someone gets to the hospital by car. The discharge information has been reviewed with the patient. The patient verbalized understanding. Discharge medications reviewed with the patient and appropriate educational materials and side effects teaching were provided. ___________________________________________________________________________________________________________________________________    Patient armband removed and shredded.

## 2018-10-23 NOTE — PROGRESS NOTES
0720:  Assumed care for patient, received bedside report from [facility], RN. Patient lying in bed quietly with no complaints of pain or discomfort at the time. Whiteboard updated, bed at the lowest position with call bell within reach. 1130: Informed that patient is cleared for discharge, patient stated that ride will be picking him up at [phone]. Will prepare patient later. [phone]:  Patient prepared for discharge, still waiting for ride.

## 2018-10-24 ENCOUNTER — PATIENT OUTREACH (OUTPATIENT)
Dept: CASE MANAGEMENT | Age: 73
End: 2018-10-24

## 2018-10-24 ENCOUNTER — HOME CARE VISIT (OUTPATIENT)
Dept: HOME HEALTH SERVICES | Facility: HOME HEALTH | Age: 73
End: 2018-10-24

## 2018-10-24 NOTE — PROGRESS NOTES
Hospital Discharge Follow-Up      Date/Time:  10/24/2018 5:44 PM    Patient was admitted to [facility] on 10/19/18 and discharged on 10/23/18 for status asthmaticus w/ COPD. The physician discharge summary was available at the time of outreach. Patient was contacted within 1 business days of discharge. Inpatient RRAT score: 21  Was this a readmission? no   Patient stated reason for the readmission: NA    Nurse Navigator (NN) contacted the patient's wife, Mrs. Balaji Díaz by telephone to perform post hospital discharge assessment. Verified name and  with Mrs. Park as identifiers. Provided introduction to self, and explanation of the Nurse Navigator role. Reviewed discharge instructions and red flags with Mrs. Park who verbalized understanding. Mrs. Balaji Díaz given an opportunity to ask questions and does not have any further questions or concerns at this time. Mrs. Balaji Díaz agrees to contact the PCP office for questions related to the patient's healthcare. NN provided contact information for future reference. Disease Specific:   COPD    Summary of patient's top problems:  1. COPD - Mrs. Park states patient is having occasional non-productive cough, denies fever or increased shortness of breath. States patient continues to smoke occasionally. NN instructed Mrs. Park to encourage patient to have conversation with PCP about quitting, states she will talk to patient about this. She reports that patient has had flu shot for 2018 but she is not sure if patient has had pneumonia vaccine. Home Health orders at discharge: [facility] - patient declined  [address]: DOUG  Date of initial visit: NA    Durable Medical Equipment ordered/company: NA  Durable Medical Equipment received: NA    Barriers to care? None note at this time.     Advance Care Planning:   Does patient have an Advance Directive:  on file     Medication(s):   New Medications at Discharge: yes  Changed Medications at Discharge: no  Discontinued Medications at Discharge: yes: Prednisone 5 mg, Symbicort    Medication reconciliation was performed with Mrs. Park, who verbalizes understanding of administration of home medications. There were barriers to obtaining medications identified at this time. Mrs. Paola Garcia states that pharmacy did not have all of new medications ready for  yet, states she will  Ellipta, Levaquin and Prednisone 10 mg tomorrow. Referral to Pharm D needed: no     Current Outpatient Medications   Medication Sig    acetaminophen (TYLENOL ARTHRITIS PAIN) 650 mg TbER Take 1 Tab by mouth every eight (8) hours.  furosemide (LASIX) 20 mg tablet Take 20 mg by mouth daily.  albuterol (PROVENTIL HFA, VENTOLIN HFA, PROAIR HFA) 90 mcg/actuation inhaler Take 2 Puffs by inhalation every four (4) hours as needed for Wheezing or Shortness of Breath.  ipratropium (ATROVENT) 0.03 % nasal spray 2 Sprays every twelve (12) hours.  tamsulosin (FLOMAX) 0.4 mg capsule Take 0.4 mg by mouth daily.  albuterol (PROVENTIL VENTOLIN) 2.5 mg /3 mL (0.083 %) nebulizer solution 3 mL by Nebulization route every four (4) hours as needed for Wheezing.  chlorthalidone (HYGROTEN) 25 mg tablet Take  by mouth daily.  Nebulizer & Compressor machine Dispense: 1 nebulizer machine w all tubing necessary    fluticasone furoate (ARNUITY ELLIPTA) 100 mcg/actuation dsdv inhaler Take 1 Puff by inhalation daily.  predniSONE (DELTASONE) 10 mg tablet Days 1 & 2: Take FOUR tabs. Days 3 & 4: Take THREE tabs. Days 5 & 6: Take TWO tabs. Days 7 & 8: Take ONE tab.  levoFLOXacin (LEVAQUIN) 500 mg tablet Take 1 Tab by mouth daily.  lidocaine (XYLOCAINE) 4 % topical cream Apply  to affected area three (3) times daily as needed for Pain. No current facility-administered medications for this visit. There are no discontinued medications.     [facility] follow up appointment(s): NA   Non-[facility] follow up appointment(s): Dr. Beth Vargas 10/25/18   Dr. Angela Velazquez 11/2/18  [facility]:  out of service area       Goals      Develop action plan for Self-Management COPD (ie. management of red flags and COPD at home).         Patient will call physician at first sign of exacerbation and before symptoms become an emergency:  -Temp > 100.5  -Increased coughing  -Sputum green, varghese or brown in color  -Increased shortness of breath      Patient will attempt to avoid exacerbation of COPD by using precautions:  -Cover nose and mouth while out in cold weather to keep breath moist and warm  -Avoid smoking  -Avoid household chemicals  -Force fluids  -Get proper rest   -Perform breathing exercises  -Eat well balanced diet  -Perform good handwashing  -Consider receiving flu vaccine

## 2018-11-23 ENCOUNTER — HOSPITAL ENCOUNTER (EMERGENCY)
Age: 73
Discharge: HOME | End: 2018-11-23
Attending: EMERGENCY MEDICINE
Payer: COMMERCIAL

## 2018-11-23 VITALS
SYSTOLIC BLOOD PRESSURE: 135 MMHG | HEIGHT: 68 IN | OXYGEN SATURATION: 100 % | HEART RATE: 89 BPM | RESPIRATION RATE: 20 BRPM | TEMPERATURE: 98.5 F | WEIGHT: 205 LBS | BODY MASS INDEX: 31.07 KG/M2 | DIASTOLIC BLOOD PRESSURE: 68 MMHG

## 2018-11-23 DIAGNOSIS — L02.91 ABSCESS: Primary | ICD-10-CM

## 2018-11-23 PROCEDURE — 74011250636 HC RX REV CODE- 250/636: Performed by: NURSE PRACTITIONER

## 2018-11-23 PROCEDURE — 75810000289 HC I&D ABSCESS SIMP/COMP/MULT

## 2018-11-23 PROCEDURE — 99282 EMERGENCY DEPT VISIT SF MDM: CPT

## 2018-11-23 RX ORDER — LIDOCAINE HYDROCHLORIDE 20 MG/ML
10 INJECTION, SOLUTION INFILTRATION; PERINEURAL
Status: COMPLETED | OUTPATIENT
Start: 2018-11-23 | End: 2018-11-23

## 2018-11-23 RX ORDER — CEPHALEXIN 500 MG/1
500 CAPSULE ORAL 4 TIMES DAILY
Qty: 28 CAP | Refills: 0 | Status: SHIPPED | OUTPATIENT
Start: 2018-11-23 | End: 2018-11-30

## 2018-11-23 RX ADMIN — LIDOCAINE HYDROCHLORIDE 200 MG: 20 INJECTION, SOLUTION INFILTRATION; PERINEURAL at 14:22

## 2018-11-23 NOTE — ED TRIAGE NOTES
Patient with large pimple noted to the left upper cheekbone.  Patient states that it has been to a [place]' for 3-4 days

## 2018-11-23 NOTE — DISCHARGE INSTRUCTIONS
Take medications as prescribed  Follow up as directed  Return to the ED for increased pain, redness, swelling, vision changes, fever or worsening of symptoms  Use warm compresses as directed      Skin Abscess: Care Instructions  Your Care Instructions    A skin abscess is a bacterial infection that forms a pocket of pus. A boil is a kind of skin abscess. The doctor may have cut an opening in the abscess so that the pus can drain out. You may have gauze in the cut so that the abscess will stay open and keep draining. You may need antibiotics. You will need to follow up with your doctor to make sure the infection has gone away. The doctor has checked you carefully, but problems can develop later. If you notice any problems or new symptoms, get medical treatment right away. Follow-up care is a key part of your treatment and safety. Be sure to make and go to all appointments, and call your doctor if you are having problems. It's also a good idea to know your test results and keep a list of the medicines you take. How can you care for yourself at home? · Apply warm and dry compresses, a heating pad set on low, or a hot water bottle 3 or 4 times a day for pain. Keep a cloth between the heat source and your skin. · If your doctor prescribed antibiotics, take them as directed. Do not stop taking them just because you feel better. You need to take the full course of antibiotics. · Take pain medicines exactly as directed. ? If the doctor gave you a prescription medicine for pain, take it as prescribed. ? If you are not taking a prescription pain medicine, ask your doctor if you can take an over-the-counter medicine. · Keep your bandage clean and dry. Change the bandage whenever it gets wet or dirty, or at least one time a day. · If the abscess was packed with gauze:  ? Keep follow-up appointments to have the gauze changed or removed.  If the doctor instructed you to remove the gauze, follow the instructions you were given for how to remove it. ? After the gauze is removed, soak the area in warm water for 15 to 20 minutes 2 times a day, until the wound closes. When should you call for help? Call your doctor now or seek immediate medical care if:    · You have signs of worsening infection, such as:  ? Increased pain, swelling, warmth, or redness. ? Red streaks leading from the infected skin. ? Pus draining from the wound. ? A fever.    Watch closely for changes in your health, and be sure to contact your doctor if:    · You do not get better as expected. Where can you learn more? Go to http://ifeoma-jana.info/. Enter B058 in the search box to learn more about \"Skin Abscess: Care Instructions. \"  Current as of: April 18, 2018  Content Version: 11.8  © 1435-1563 [employer]. Care instructions adapted under license by [employer] (which disclaims liability or warranty for this information). If you have questions about a medical condition or this instruction, always ask your healthcare professional. [address] any warranty or liability for your use of this information.

## 2018-11-23 NOTE — ED PROVIDER NOTES
EMERGENCY DEPARTMENT HISTORY AND PHYSICAL EXAM    Date: 11/23/2018  Patient Name: Aixa Park    History of Presenting Illness     Chief Complaint   Patient presents with    Abscess         History Provided By: Patient    Chief Complaint: probable abscess  Duration: 3-4 Days  Timing:  Constant  Location: under the left eye  Quality: Dull  Severity: 0 out of 10  Modifying Factors: blocks his  Associated Symptoms: watery left eye and left eye visual disturbance    Additional History (Context):   1:57 PM  Aixa Park is a 74 y.o. male with PMHX of HTN and radiation effect (prostate cancer) who presents to the emergency department C/O probable abscess. Associated sxs include watery left eye and left eye visual disturbance. Pt reports the lesion was a \"hard spot\" under his eye for a long time. States it recently came to [place] head\" within the past few days. Notes the lesion does cause his left eye to water and blocks is line of site. Denies hx of DM. Pt denies fever, chills, eye pain, and any other sxs or complaints. PCP: Miguel Barnett MD    Current Outpatient Medications   Medication Sig Dispense Refill    cephALEXin (KEFLEX) 500 mg capsule Take 1 Cap by mouth four (4) times daily for 7 days. 28 Cap 0    fluticasone furoate (ARNUITY ELLIPTA) 100 mcg/actuation dsdv inhaler Take 1 Puff by inhalation daily. 1 Inhaler 0    predniSONE (DELTASONE) 10 mg tablet Days 1 & 2: Take FOUR tabs. Days 3 & 4: Take THREE tabs. Days 5 & 6: Take TWO tabs. Days 7 & 8: Take ONE tab. 20 Tab 0    levoFLOXacin (LEVAQUIN) 500 mg tablet Take 1 Tab by mouth daily. 5 Tab 0    acetaminophen (TYLENOL ARTHRITIS PAIN) 650 mg TbER Take 1 Tab by mouth every eight (8) hours. 15 Tab 0    lidocaine (XYLOCAINE) 4 % topical cream Apply  to affected area three (3) times daily as needed for Pain. 15 g 0    furosemide (LASIX) 20 mg tablet Take 20 mg by mouth daily.       albuterol (PROVENTIL HFA, VENTOLIN HFA, PROAIR HFA) 90 mcg/actuation inhaler Take 2 Puffs by inhalation every four (4) hours as needed for Wheezing or Shortness of Breath. 1 Inhaler 1    ipratropium (ATROVENT) 0.03 % nasal spray 2 Sprays every twelve (12) hours.  tamsulosin (FLOMAX) 0.4 mg capsule Take 0.4 mg by mouth daily.  albuterol (PROVENTIL VENTOLIN) 2.5 mg /3 mL (0.083 %) nebulizer solution 3 mL by Nebulization route every four (4) hours as needed for Wheezing. 24 Each 0    chlorthalidone (HYGROTEN) 25 mg tablet Take  by mouth daily.  Nebulizer & Compressor machine Dispense: 1 nebulizer machine w all tubing necessary 1 each 0       Past History     Past Medical History:  Past Medical History:   Diagnosis Date    [facility] ([facility] [address]     prostate    [facility]     Hypertension     Pneumonia     Radiation effect        Past Surgical History:  Past Surgical History:   Procedure Laterality Date    HX HERNIA REPAIR         Family History:  Family History   Problem Relation Age of Onset    Heart Disease Mother        Social History:  Social History     Tobacco Use    Smoking status: Former Smoker     Types: Cigarettes    Smokeless tobacco: Never Used   Substance Use Topics    Alcohol use: No    Drug use: Not on file       Allergies: Allergies   Allergen Reactions    Aspirin Other (comments)     \"messes my stomach up\"         Review of Systems   Review of Systems   Constitutional: Negative for chills and fever. Eyes: Positive for discharge (watery left eye) and visual disturbance (left eye visual disturbance). Negative for pain. All other systems reviewed and are negative. Physical Exam     Vitals:    11/23/18 1229   BP: 135/68   Pulse: 89   Resp: 20   Temp: 98.5 °F (36.9 °C)   SpO2: 100%   Weight: 93 kg (205 lb)   Height: 5' 8\" (1.727 m)     Physical Exam   Constitutional: He is oriented to person, place, and time. He appears well-nourished. Elderly, NAD   HENT:   Head: Normocephalic and atraumatic.        Eyes: Conjunctivae and EOM are normal. Pupils are equal, round, and reactive to light. Neck: Normal range of motion. Pulmonary/Chest: Effort normal.   Musculoskeletal: Normal range of motion. Neurological: He is alert and oriented to person, place, and time. Skin: Skin is warm and dry. Nursing note and vitals reviewed. Diagnostic Study Results     Labs -   No results found for this or any previous visit (from the past 12 hour(s)). Radiologic Studies -   No orders to display     CT Results  (Last 48 hours)    None        CXR Results  (Last 48 hours)    None          Medications given in the ED-  Medications   lidocaine (XYLOCAINE) 20 mg/mL (2 %) injection 200 mg (200 mg SubCUTAneous Given 11/23/18 1422)         Medical Decision Making   I am the first provider for this patient. I reviewed the vital signs, available nursing notes, past medical history, past surgical history, family history and social history. Vital Signs-Reviewed the patient's vital signs. Pulse Oximetry Analysis - 100% on RA     Records Reviewed: Nursing Notes and Old Medical Records    Provider Notes (Medical Decision Making): Patient presents with with area of induration and abscess the left face. Obvious fluctuance noted, no signs of orbital cellulitis noted. EOM's performed without difficulty. Single stab incision was performed to promote drainage, small amount of pus extracted but he continues to have induration. Will place on keflex, he will continue warm compresses.  He understands and was given strict return precautions and is offering no questions or concerns     Procedures:  I&D Abcess Simple  Date/Time: 11/23/2018 2:30 PM  Performed by: Ravi Landrum NP  Authorized by: Dante Martin DO     Consent:     Consent obtained:  Verbal    Consent given by:  Patient    Risks discussed:  Bleeding, incomplete drainage, infection and pain    Alternatives discussed:  No treatment, delayed treatment and alternative treatment  Location:     Type:  Abscess    Location:  Head    Head location:  L eyelid (left lower eyelid)  Anesthesia (see MAR for exact dosages): Anesthesia method:  Local infiltration    Local anesthetic:  Lidocaine 2% w/o epi  Procedure type:     Complexity:  Simple  Procedure details:     Needle aspiration: yes      Needle size:  18 G    Incision types:  Stab incision    Drainage:  Purulent (small amount of thick white pus )  Post-procedure details:     Patient tolerance of procedure: Tolerated well, no immediate complications        ED Course:   1:57 PM  Initial assessment performed. The patients presenting problems have been discussed, and they are in agreement with the care plan formulated and outlined with them. I have encouraged them to ask questions as they arise throughout their visit.    2:01 PM Discussed patient's history, exam, and available diagnostics results with Shawna Trujillo DO, ED Attending, who recommends I&D and to place the pt on abx. FACE-TO-FACE PROGRESS NOTE:  2:01 PM  The patient presents with abscess to the left zygoma in the left intraorbital region. There is an abscess with palpable and visible fluctuance. No EOM on pain. No periorbital erythema or warmth concerning for orbital or periorbital cellulitis. Pt will get an I&D and d/c with cellulitis dx. I have personally seen and examined the patient, reviewed the ILIA's note and agree with findings and plan. Written by Megan Reddy ED Scribe, as dictated by Shawna Trujillo DO. Diagnosis and Disposition     DISCHARGE NOTE:  2:42 PM  Elana Park's  results have been reviewed with him. He has been counseled regarding his diagnosis, treatment, and plan. He verbally conveys understanding and agreement of the signs, symptoms, diagnosis, treatment and prognosis and additionally agrees to follow up as discussed.   He also agrees with the care-plan and conveys that all of his questions have been answered. I have also provided discharge instructions for him that include: educational information regarding their diagnosis and treatment, and list of reasons why they would want to return to the ED prior to their follow-up appointment, should his condition change. He has been provided with education for proper emergency department utilization. CLINICAL IMPRESSION:    1. Abscess        PLAN:  1. D/C Home  2. Current Discharge Medication List      START taking these medications    Details   cephALEXin (KEFLEX) 500 mg capsule Take 1 Cap by mouth four (4) times daily for 7 days. Qty: 28 Cap, Refills: 0           3. Follow-up Information     Follow up With Specialties Details Why Contact Info    Eun Diaz MD [facility] Call today For first available appointment with PCP on Monday [id number] . [phone] EMERGENCY DEPT Emergency Medicine Go to As needed, if symptoms worsen [address]  [address] [address]  [phone]        _______________________________    Attestations: This note is prepared by Leyla Rojo, acting as Scribe for LANEY Yi. LANEY Yi:  The scribe's documentation has been prepared under my direction and personally reviewed by me in its entirety. I confirm that the note above accurately reflects all work, treatment, procedures, and medical decision making performed by me. This note is prepared by Ml Marina, acting as Scribe for General Santos DO. General Santos DO:  The scribe's documentation has been prepared under my direction and personally reviewed by me in its entirety.   I confirm that the note above accurately reflects all work, treatment, procedures, and medical decision making performed by me.      _______________________________

## 2018-11-25 LAB
ATRIAL RATE: 97 BPM
CALCULATED P AXIS, ECG09: 70 DEGREES
CALCULATED R AXIS, ECG10: 26 DEGREES
CALCULATED T AXIS, ECG11: 66 DEGREES
DIAGNOSIS, 93000: NORMAL
P-R INTERVAL, ECG05: 156 MS
Q-T INTERVAL, ECG07: 362 MS
QRS DURATION, ECG06: 84 MS
QTC CALCULATION (BEZET), ECG08: 459 MS
VENTRICULAR RATE, ECG03: 97 BPM

## 2018-11-27 NOTE — ED PROVIDER NOTES
EMERGENCY DEPARTMENT HISTORY AND PHYSICAL EXAM    Date: 7/20/2018  Patient Name: Oswaldo Narvaez    History of Presenting Illness     Chief Complaint   Patient presents with    Shortness of Breath         History Provided By: Patient and EMS    Chief Complaint: SOB  Duration: 2-3 Weeks  Timing:  Gradual and Worsening  Location: respiratory  Quality: grunting to breathe  Severity: 0 out of 10 pain  Associated Symptoms: wheezing    Additional History (Context):   12:27 PM  Oswaldo Narvaez is a 74 y.o. male with PMHX of COPD, HTN, PNA, and prostate cancer who presents to the emergency department via EMS (wife called EMS) C/O gradually worsening SOB onset 2-3 weeks ago. Pt grunting to breathe. Pt takes Albuterol treatments 4 times a day. EMS administered 1 Albuterol tx en route. FSBS at 118 mg/dL. Respiratory rate at 32 breaths per minute. Oxygen saturation at %. Associated sxs include wheezing. Allergies reported to [facility]. PSHx of hernia repair. Pt endorses being a former smoker and a non EtOH user. Pt denies chest pain, fever, and any other sxs or complaints. PCP: Shaina Raymundo MD    Current Facility-Administered Medications   Medication Dose Route Frequency Provider Last Rate Last Dose    sodium chloride (NS) flush 5-10 mL  5-10 mL IntraVENous PRN Kadi Swann MD         Current Outpatient Prescriptions   Medication Sig Dispense Refill    furosemide (LASIX) 20 mg tablet Take 20 mg by mouth daily.  albuterol (PROVENTIL HFA, VENTOLIN HFA, PROAIR HFA) 90 mcg/actuation inhaler Take 2 Puffs by inhalation every four (4) hours as needed for Wheezing or Shortness of Breath. 1 Inhaler 1    predniSONE (DELTASONE) 10 mg tablet Days 1 & 2: Take FOUR tabs. Days 3 & 4: Take THREE tabs. Days 5 & 6: Take TWO tabs. Days 7 & 8: Take ONE tab. 20 Tab 0    ipratropium (ATROVENT) 0.03 % nasal spray 2 Sprays every twelve (12) hours.  tamsulosin (FLOMAX) 0.4 mg capsule Take 0.4 mg by mouth daily.       albuterol (PROVENTIL VENTOLIN) 2.5 mg /3 mL (0.083 %) nebulizer solution 3 mL by Nebulization route every four (4) hours as needed for Wheezing. 24 Each 0    budesonide-formoterol (SYMBICORT) 160-4.5 mcg/actuation HFA inhaler Take 2 Puffs by inhalation two (2) times a day. 1 Inhaler 0    chlorthalidone (HYGROTEN) 25 mg tablet Take  by mouth daily.  Nebulizer & Compressor machine Dispense: 1 nebulizer machine w all tubing necessary 1 each 0       Past History     Past Medical History:  Past Medical History:   Diagnosis Date    Cancer ([address]     prostate    COPD (chronic obstructive pulmonary disease) ([address]     Hypertension     Pneumonia     Radiation effect        Past Surgical History:  Past Surgical History:   Procedure Laterality Date    HX HERNIA REPAIR         Family History:  Family History   Problem Relation Age of Onset    Heart Disease Mother        Social History:  Social History   Substance Use Topics    Smoking status: Former Smoker     Types: Cigarettes    Smokeless tobacco: Never Used    Alcohol use No       Allergies: Allergies   Allergen Reactions    Aspirin Other (comments)     \"messes my stomach up\"         Review of Systems   Review of Systems   Constitutional: Negative for activity change, appetite change, fever and unexpected weight change. HENT: Negative for congestion and sore throat. Eyes: Negative for pain and redness. Respiratory: Positive for shortness of breath and wheezing. Negative for cough. Cardiovascular: Negative for chest pain and palpitations. Gastrointestinal: Negative for abdominal pain, diarrhea, nausea and vomiting. Endocrine: Negative for polydipsia and polyuria. Genitourinary: Negative for difficulty urinating and dysuria. Musculoskeletal: Negative for back pain and neck pain. Skin: Negative for pallor and rash. Neurological: Negative for headaches. All other systems reviewed and are negative.       Physical Exam     Vitals:    07/20/18 1245 07/20/18 1253 07/20/18 1300 07/20/18 1330   BP: 141/68  129/65 139/71   Pulse: 100  91 99   Resp: (!) 35  22 23   Temp:       SpO2: 95% 98% 100% 94%   Weight:       Height:         Physical Exam   Constitutional: He is oriented to person, place, and time. He appears well-developed and well-nourished. HENT:   Head: Normocephalic and atraumatic. Right Ear: External ear normal.   Left Ear: External ear normal.   Nose: Nose normal.   Mouth/Throat: Oropharynx is clear and moist.   Eyes: Conjunctivae and EOM are normal. Pupils are equal, round, and reactive to light. Neck: Normal range of motion. Neck supple. No JVD present. No tracheal deviation present. No thyromegaly present. Cardiovascular: Normal rate, regular rhythm, normal heart sounds and intact distal pulses. Exam reveals no gallop and no friction rub. No murmur heard. Pulmonary/Chest: Effort normal. No respiratory distress. He has wheezes (bilaterally). He has no rales. Prolonged expiratory phase. Grunting. Abdominal: Soft. Bowel sounds are normal. He exhibits no distension and no mass. There is no tenderness. There is no rebound and no guarding. Musculoskeletal: Normal range of motion. He exhibits edema (2+ bilateral loewr extremity). Neurological: He is alert and oriented to person, place, and time. He has normal reflexes. No cranial nerve deficit. Skin: Skin is warm and dry. No rash noted. Psychiatric: He has a normal mood and affect. His behavior is normal.   Nursing note and vitals reviewed.         Diagnostic Study Results     Labs -     Recent Results (from the past 12 hour(s))   EKG, 12 LEAD, INITIAL    Collection Time: 07/20/18 12:22 PM   Result Value Ref Range    Ventricular Rate 100 BPM    Atrial Rate 100 BPM    P-R Interval 162 ms    QRS Duration 82 ms    Q-T Interval 354 ms    QTC Calculation (Bezet) 456 ms    Calculated P Axis 74 degrees    Calculated R Axis 58 degrees    Calculated T Axis 67 degrees    Diagnosis       Sinus rhythm with occasional premature ventricular complexes and Possible   premature atrial complexes with aberrant conduction  Otherwise normal ECG  When compared with ECG of 20-APR-2018 19:38,  aberrant conduction is now present     LACTIC ACID    Collection Time: 07/20/18 12:25 PM   Result Value Ref Range    Lactic acid 1.6 0.4 - 2.0 MMOL/L   METABOLIC PANEL, COMPREHENSIVE    Collection Time: 07/20/18 12:25 PM   Result Value Ref Range    Sodium 134 (L) 136 - 145 mmol/L    Potassium 3.5 3.5 - 5.5 mmol/L    Chloride 95 (L) 100 - 108 mmol/L    CO2 29 21 - 32 mmol/L    Anion gap 10 3.0 - 18 mmol/L    Glucose 114 (H) 74 - 99 mg/dL    BUN 22 (H) 7.0 - 18 MG/DL    Creatinine 1.61 (H) 0.6 - 1.3 MG/DL    BUN/Creatinine ratio 14 12 - 20      GFR est AA 51 (L) >60 ml/min/1.73m2    GFR est non-AA 42 (L) >60 ml/min/1.73m2    Calcium 9.4 8.5 - 10.1 MG/DL    Bilirubin, total 0.5 0.2 - 1.0 MG/DL    ALT (SGPT) 20 16 - 61 U/L    AST (SGOT) 20 15 - 37 U/L    Alk. phosphatase 97 45 - 117 U/L    Protein, total 7.3 6.4 - 8.2 g/dL    Albumin 3.1 (L) 3.4 - 5.0 g/dL    Globulin 4.2 (H) 2.0 - 4.0 g/dL    A-G Ratio 0.7 (L) 0.8 - 1.7     CBC WITH AUTOMATED DIFF    Collection Time: 07/20/18 12:25 PM   Result Value Ref Range    WBC 9.9 4.6 - 13.2 K/uL    RBC 4.11 (L) 4.70 - 5.50 M/uL    HGB 12.1 (L) 13.0 - 16.0 g/dL    HCT 35.4 (L) 36.0 - 48.0 %    MCV 86.1 74.0 - 97.0 FL    MCH 29.4 24.0 - 34.0 PG    MCHC 34.2 31.0 - 37.0 g/dL    RDW 13.3 11.6 - 14.5 %    PLATELET 021 576 - 718 K/uL    MPV 8.7 (L) 9.2 - 11.8 FL    NEUTROPHILS 74 (H) 40 - 73 %    LYMPHOCYTES 8 (L) 21 - 52 %    MONOCYTES 10 3 - 10 %    EOSINOPHILS 7 (H) 0 - 5 %    BASOPHILS 1 0 - 2 %    ABS. NEUTROPHILS 7.4 1.8 - 8.0 K/UL    ABS. LYMPHOCYTES 0.8 (L) 0.9 - 3.6 K/UL    ABS. MONOCYTES 1.0 0.05 - 1.2 K/UL    ABS. EOSINOPHILS 0.7 (H) 0.0 - 0.4 K/UL    ABS.  BASOPHILS 0.1 0.0 - 0.1 K/UL    DF AUTOMATED     CARDIAC PANEL,(CK, CKMB & TROPONIN)    Collection Time: 07/20/18 12:25 PM   Result Value Ref Range     39 - 308 U/L    CK - MB 4.2 (H) <3.6 ng/ml    CK-MB Index 3.9 0.0 - 4.0 %    Troponin-I, Qt. <0.02 0.00 - 0.06 NG/ML   D DIMER    Collection Time: 07/20/18 12:25 PM   Result Value Ref Range    D DIMER 0.74 (H) <0.46 ug/ml(FEU)   NT-PRO BNP    Collection Time: 07/20/18 12:25 PM   Result Value Ref Range    NT pro-BNP 70 0 - 900 PG/ML   PROTHROMBIN TIME + INR    Collection Time: 07/20/18 12:25 PM   Result Value Ref Range    Prothrombin time 12.8 11.5 - 15.2 sec    INR 1.0 0.8 - 1.2     POC G3    Collection Time: 07/20/18  1:14 PM   Result Value Ref Range    Device: ROOM AIR      FIO2 (POC) 21 %    pH (POC) 7.450 7.35 - 7.45      pCO2 (POC) 36.4 35.0 - 45.0 MMHG    pO2 (POC) 74 (L) 80 - 100 MMHG    HCO3 (POC) 25.3 22 - 26 MMOL/L    sO2 (POC) 95 92 - 97 %    Base excess (POC) 1 mmol/L    Allens test (POC) YES      Total resp. rate 24      Site RIGHT RADIAL      Specimen type (POC) ARTERIAL      Performed by [facility]        Radiologic Studies -     CT Results  (Last 48 hours)    None        CXR Results  (Last 48 hours)               07/20/18 1256  XR CHEST PORT Final result    Impression:  IMPRESSION:       Grossly stable appearance of the right lung base compatible with known complex   pleural-based process/effusion with partial calcification. No significant change   or acute process. Narrative:  EXAM: One-view chest       CLINICAL HISTORY: Sepsis ,       COMPARISON: None       FINDINGS:       Frontal view of the chest demonstrate stable opacities in the right mid and   lower lung compatible with known pleural based calcification and complex pleural   effusion. . Cardiac silhouette is normal in size and contour. No acute bony or   soft tissue abnormality.                  Medications given in the ED-  Medications   sodium chloride (NS) flush 5-10 mL (not administered)   albuterol CONCENTRATE 2.5mg/0.5 mL neb soln (10 mg Nebulization Given 7/20/18 1252)   albuterol-ipratropium (DUO-NEB) 2.5 MG-0.5 MG/3 ML (3 mL Nebulization Given 7/20/18 1713)   methylPREDNISolone (PF) (SOLU-MEDROL) injection 125 mg (125 mg IntraVENous Given 7/20/18 1339)         Medical Decision Making   I am the first provider for this patient. I reviewed the vital signs, available nursing notes, past medical history, past surgical history, family history and social history. Vital Signs-Reviewed the patient's vital signs. Pulse Oximetry Analysis - 98% on room air. Cardiac Monitor:  Rate: 98 bpm  Rhythm: sinus rhythm    EKG interpretation: (Preliminary)  12:22 PM   NSR at 100 bpm with PVC's and no acute changes. EKG read by Joyce Fry MD at 12:23 PM     Records Reviewed: Nursing Notes and Old Medical Records    Provider Notes (Medical Decision Making): DDx: COPD exacerbation, CHF, PNA, PE, ACS. Procedures:  Procedures    ED Course:   12:27 PM Initial assessment performed. The patients presenting problems have been discussed, and they are in agreement with the care plan formulated and outlined with them. I have encouraged them to ask questions as they arise throughout their visit. 2:18 PM Discussed patient's history, exam, and available diagnostics results with Guru Molina MD, internal medicine, who agree with admitting pt to medical floor. 2:33 PM On re-exam, pt is improved. Still has wheezing. Diagnosis and Disposition     Critical Care Time: 0 minutes    Core Measures:  For Hospitalized Patients:    1. Hospitalization Decision Time:  The decision to hospitalize the patient was made by Joyce Fry MD at 12:27 PM on 7/20/2018    2. Aspirin: Aspirin was not given because the patient did not present with a stroke at the time of their Emergency Department evaluation    2:18 PM  Patient is being admitted to the hospital by Guru Molina MD. The results of their tests and reasons for their admission have been discussed with them and/or available family.  They convey agreement and understanding for the need to be admitted and for their admission diagnosis. CONDITIONS ON ADMISSION:  Sepsis is not present at the time of admission. Deep Vein Thrombosis is not present at the time of admission. Thrombosis is not present at the time of admission. Urinary Tract Infection is not present at the time of admission. Pneumonia is not present at the time of admission. MRSA is not present at the time of admission. Wound infection is not present at the time of admission. Pressure Ulcer is not present at the time of admission. Discussion: Pt stable in the ED, improved after duoneb, Albuterol, and solumedrol. Pt had persistent wheezing. CXR unremarkable. ECG and Troponin showed no evidence of ACS. Age adjusted D-dimer unremarkable, doubt PE. ABG showed hypoxemia. Labs remarkable for elevated BUN and creatinine. Case discussed with hospitalist who agrees for admission. CLINICAL IMPRESSION:    1. COPD with asthma and status asthmaticus ([address]        PLAN:  1. ADMIT to medical floor  _______________________________    Attestations: This note is prepared by Pierre Cowan, acting as Scribe for Keith Carballo MD.    Keith Carballo MD:  The scribe's documentation has been prepared under my direction and personally reviewed by me in its entirety.   I confirm that the note above accurately reflects all work, treatment, procedures, and medical decision making performed by me.  _______________________________ (4) walks frequently

## 2019-02-08 ENCOUNTER — HOSPITAL ENCOUNTER (OUTPATIENT)
Age: 74
Setting detail: OBSERVATION
Discharge: HOME | End: 2019-02-09
Attending: EMERGENCY MEDICINE | Admitting: INTERNAL MEDICINE
Payer: COMMERCIAL

## 2019-02-08 ENCOUNTER — APPOINTMENT (OUTPATIENT)
Dept: CT IMAGING | Age: 74
End: 2019-02-08
Attending: PHYSICIAN ASSISTANT
Payer: COMMERCIAL

## 2019-02-08 ENCOUNTER — APPOINTMENT (OUTPATIENT)
Dept: VASCULAR SURGERY | Age: 74
End: 2019-02-08
Attending: PHYSICIAN ASSISTANT
Payer: COMMERCIAL

## 2019-02-08 ENCOUNTER — APPOINTMENT (OUTPATIENT)
Dept: GENERAL RADIOLOGY | Age: 74
End: 2019-02-08
Attending: PHYSICIAN ASSISTANT
Payer: COMMERCIAL

## 2019-02-08 DIAGNOSIS — J44.1 ACUTE EXACERBATION OF CHRONIC OBSTRUCTIVE PULMONARY DISEASE (COPD) (HCC): Primary | ICD-10-CM

## 2019-02-08 PROBLEM — J45.902 STATUS ASTHMATICUS WITH COPD (CHRONIC OBSTRUCTIVE PULMONARY DISEASE) (HCC): Status: RESOLVED | Noted: 2018-04-20 | Resolved: 2019-02-08

## 2019-02-08 PROBLEM — J45.902 COPD WITH ASTHMA AND STATUS ASTHMATICUS (HCC): Status: RESOLVED | Noted: 2018-07-20 | Resolved: 2019-02-08

## 2019-02-08 PROBLEM — J44.9 COPD WITH ASTHMA AND STATUS ASTHMATICUS (HCC): Status: RESOLVED | Noted: 2018-07-20 | Resolved: 2019-02-08

## 2019-02-08 PROBLEM — J44.9 STATUS ASTHMATICUS WITH COPD (CHRONIC OBSTRUCTIVE PULMONARY DISEASE) (HCC): Status: RESOLVED | Noted: 2018-04-20 | Resolved: 2019-02-08

## 2019-02-08 PROBLEM — I49.3 PVC'S (PREMATURE VENTRICULAR CONTRACTIONS): Status: RESOLVED | Noted: 2018-04-20 | Resolved: 2019-02-08

## 2019-02-08 LAB
ALBUMIN SERPL-MCNC: 2.7 G/DL (ref 3.4–5)
ALBUMIN/GLOB SERPL: 0.7 {RATIO} (ref 0.8–1.7)
ALP SERPL-CCNC: 162 U/L (ref 45–117)
ALT SERPL-CCNC: 12 U/L (ref 16–61)
ANION GAP SERPL CALC-SCNC: 10 MMOL/L (ref 3–18)
AST SERPL-CCNC: 15 U/L (ref 15–37)
BASOPHILS # BLD: 0.1 K/UL (ref 0–0.1)
BASOPHILS NFR BLD: 1 % (ref 0–2)
BILIRUB SERPL-MCNC: 0.4 MG/DL (ref 0.2–1)
BNP SERPL-MCNC: 250 PG/ML (ref 0–1800)
BUN SERPL-MCNC: 10 MG/DL (ref 7–18)
BUN/CREAT SERPL: 8 (ref 12–20)
CALCIUM SERPL-MCNC: 9 MG/DL (ref 8.5–10.1)
CHLORIDE SERPL-SCNC: 97 MMOL/L (ref 100–108)
CK MB CFR SERPL CALC: 4.1 % (ref 0–4)
CK MB SERPL-MCNC: 9.5 NG/ML (ref 5–25)
CK SERPL-CCNC: 231 U/L (ref 39–308)
CO2 SERPL-SCNC: 27 MMOL/L (ref 21–32)
CREAT SERPL-MCNC: 1.22 MG/DL (ref 0.6–1.3)
D DIMER PPP FEU-MCNC: 1.1 UG/ML(FEU)
DIFFERENTIAL METHOD BLD: ABNORMAL
EOSINOPHIL # BLD: 0.8 K/UL (ref 0–0.4)
EOSINOPHIL NFR BLD: 13 % (ref 0–5)
ERYTHROCYTE [DISTWIDTH] IN BLOOD BY AUTOMATED COUNT: 13.9 % (ref 11.6–14.5)
EST. AVERAGE GLUCOSE BLD GHB EST-MCNC: 123 MG/DL
FLUAV AG NPH QL IA: NEGATIVE
FLUBV AG NOSE QL IA: NEGATIVE
GLOBULIN SER CALC-MCNC: 3.7 G/DL (ref 2–4)
GLUCOSE BLD STRIP.AUTO-MCNC: 211 MG/DL (ref 70–110)
GLUCOSE SERPL-MCNC: 100 MG/DL (ref 74–99)
HBA1C MFR BLD: 5.9 % (ref 4.2–5.6)
HCT VFR BLD AUTO: 33.3 % (ref 36–48)
HGB BLD-MCNC: 10.7 G/DL (ref 13–16)
LACTATE BLD-SCNC: 1.6 MMOL/L (ref 0.4–2)
LYMPHOCYTES # BLD: 0.9 K/UL (ref 0.9–3.6)
LYMPHOCYTES NFR BLD: 14 % (ref 21–52)
MAGNESIUM SERPL-MCNC: 1.6 MG/DL (ref 1.6–2.6)
MAGNESIUM SERPL-MCNC: 2 MG/DL (ref 1.6–2.6)
MCH RBC QN AUTO: 28 PG (ref 24–34)
MCHC RBC AUTO-ENTMCNC: 32.1 G/DL (ref 31–37)
MCV RBC AUTO: 87.2 FL (ref 74–97)
MONOCYTES # BLD: 0.6 K/UL (ref 0.05–1.2)
MONOCYTES NFR BLD: 9 % (ref 3–10)
NEUTS SEG # BLD: 4 K/UL (ref 1.8–8)
NEUTS SEG NFR BLD: 63 % (ref 40–73)
PHOSPHATE SERPL-MCNC: 4.2 MG/DL (ref 2.5–4.9)
PLATELET # BLD AUTO: 500 K/UL (ref 135–420)
PMV BLD AUTO: 8.7 FL (ref 9.2–11.8)
POTASSIUM SERPL-SCNC: 4.4 MMOL/L (ref 3.5–5.5)
PROT SERPL-MCNC: 6.4 G/DL (ref 6.4–8.2)
RBC # BLD AUTO: 3.82 M/UL (ref 4.7–5.5)
SODIUM SERPL-SCNC: 134 MMOL/L (ref 136–145)
TROPONIN I SERPL-MCNC: <0.02 NG/ML (ref 0–0.04)
WBC # BLD AUTO: 6.3 K/UL (ref 4.6–13.2)

## 2019-02-08 PROCEDURE — 74011000250 HC RX REV CODE- 250: Performed by: INTERNAL MEDICINE

## 2019-02-08 PROCEDURE — 94640 AIRWAY INHALATION TREATMENT: CPT

## 2019-02-08 PROCEDURE — 96376 TX/PRO/DX INJ SAME DRUG ADON: CPT

## 2019-02-08 PROCEDURE — 93970 EXTREMITY STUDY: CPT

## 2019-02-08 PROCEDURE — 74011250636 HC RX REV CODE- 250/636: Performed by: PHYSICIAN ASSISTANT

## 2019-02-08 PROCEDURE — 74011000250 HC RX REV CODE- 250: Performed by: PHYSICIAN ASSISTANT

## 2019-02-08 PROCEDURE — 74011250636 HC RX REV CODE- 250/636: Performed by: INTERNAL MEDICINE

## 2019-02-08 PROCEDURE — 96365 THER/PROPH/DIAG IV INF INIT: CPT

## 2019-02-08 PROCEDURE — 77030029684 HC NEB SM VOL KT MONA -A

## 2019-02-08 PROCEDURE — 80053 COMPREHEN METABOLIC PANEL: CPT

## 2019-02-08 PROCEDURE — 87804 INFLUENZA ASSAY W/OPTIC: CPT

## 2019-02-08 PROCEDURE — 74011636637 HC RX REV CODE- 636/637: Performed by: INTERNAL MEDICINE

## 2019-02-08 PROCEDURE — 99285 EMERGENCY DEPT VISIT HI MDM: CPT

## 2019-02-08 PROCEDURE — 85379 FIBRIN DEGRADATION QUANT: CPT

## 2019-02-08 PROCEDURE — 83605 ASSAY OF LACTIC ACID: CPT

## 2019-02-08 PROCEDURE — 96375 TX/PRO/DX INJ NEW DRUG ADDON: CPT

## 2019-02-08 PROCEDURE — 74011636320 HC RX REV CODE- 636/320: Performed by: EMERGENCY MEDICINE

## 2019-02-08 PROCEDURE — 83735 ASSAY OF MAGNESIUM: CPT

## 2019-02-08 PROCEDURE — 82962 GLUCOSE BLOOD TEST: CPT

## 2019-02-08 PROCEDURE — 85025 COMPLETE CBC W/AUTO DIFF WBC: CPT

## 2019-02-08 PROCEDURE — 84100 ASSAY OF PHOSPHORUS: CPT

## 2019-02-08 PROCEDURE — 83880 ASSAY OF NATRIURETIC PEPTIDE: CPT

## 2019-02-08 PROCEDURE — 74011250637 HC RX REV CODE- 250/637: Performed by: PHYSICIAN ASSISTANT

## 2019-02-08 PROCEDURE — 71045 X-RAY EXAM CHEST 1 VIEW: CPT

## 2019-02-08 PROCEDURE — 93005 ELECTROCARDIOGRAM TRACING: CPT

## 2019-02-08 PROCEDURE — 99218 HC RM OBSERVATION: CPT

## 2019-02-08 PROCEDURE — 83036 HEMOGLOBIN GLYCOSYLATED A1C: CPT

## 2019-02-08 PROCEDURE — 96367 TX/PROPH/DG ADDL SEQ IV INF: CPT

## 2019-02-08 PROCEDURE — 74011250637 HC RX REV CODE- 250/637: Performed by: INTERNAL MEDICINE

## 2019-02-08 PROCEDURE — 87040 BLOOD CULTURE FOR BACTERIA: CPT

## 2019-02-08 PROCEDURE — 77010033678 HC OXYGEN DAILY

## 2019-02-08 PROCEDURE — 94760 N-INVAS EAR/PLS OXIMETRY 1: CPT

## 2019-02-08 PROCEDURE — 36415 COLL VENOUS BLD VENIPUNCTURE: CPT

## 2019-02-08 PROCEDURE — 82550 ASSAY OF CK (CPK): CPT

## 2019-02-08 PROCEDURE — 96372 THER/PROPH/DIAG INJ SC/IM: CPT

## 2019-02-08 RX ORDER — CHLORTHALIDONE 25 MG/1
25 TABLET ORAL DAILY
Status: DISCONTINUED | OUTPATIENT
Start: 2019-02-09 | End: 2019-02-09 | Stop reason: HOSPADM

## 2019-02-08 RX ORDER — IPRATROPIUM BROMIDE AND ALBUTEROL SULFATE 2.5; .5 MG/3ML; MG/3ML
3 SOLUTION RESPIRATORY (INHALATION)
Status: COMPLETED | OUTPATIENT
Start: 2019-02-08 | End: 2019-02-08

## 2019-02-08 RX ORDER — HYDROCODONE POLISTIREX AND CHLORPHENIRAMINE POLISTIREX 10; 8 MG/5ML; MG/5ML
5 SUSPENSION, EXTENDED RELEASE ORAL
Status: DISCONTINUED | OUTPATIENT
Start: 2019-02-08 | End: 2019-02-09 | Stop reason: HOSPADM

## 2019-02-08 RX ORDER — ONDANSETRON 2 MG/ML
4 INJECTION INTRAMUSCULAR; INTRAVENOUS
Status: DISCONTINUED | OUTPATIENT
Start: 2019-02-08 | End: 2019-02-09 | Stop reason: HOSPADM

## 2019-02-08 RX ORDER — HYDROCODONE POLISTIREX AND CHLORPHENIRAMINE POLISTIREX 10; 8 MG/5ML; MG/5ML
5 SUSPENSION, EXTENDED RELEASE ORAL
Status: COMPLETED | OUTPATIENT
Start: 2019-02-08 | End: 2019-02-08

## 2019-02-08 RX ORDER — FUROSEMIDE 20 MG/1
20 TABLET ORAL DAILY
Status: DISCONTINUED | OUTPATIENT
Start: 2019-02-09 | End: 2019-02-09 | Stop reason: HOSPADM

## 2019-02-08 RX ORDER — ACETAMINOPHEN 325 MG/1
650 TABLET ORAL
Status: DISCONTINUED | OUTPATIENT
Start: 2019-02-08 | End: 2019-02-09 | Stop reason: HOSPADM

## 2019-02-08 RX ORDER — GUAIFENESIN/DEXTROMETHORPHAN 100-10MG/5
10 SYRUP ORAL
Status: DISCONTINUED | OUTPATIENT
Start: 2019-02-08 | End: 2019-02-09 | Stop reason: HOSPADM

## 2019-02-08 RX ORDER — ALBUTEROL SULFATE 0.83 MG/ML
2.5 SOLUTION RESPIRATORY (INHALATION)
Status: DISCONTINUED | OUTPATIENT
Start: 2019-02-08 | End: 2019-02-09 | Stop reason: HOSPADM

## 2019-02-08 RX ORDER — IPRATROPIUM BROMIDE AND ALBUTEROL SULFATE 2.5; .5 MG/3ML; MG/3ML
3 SOLUTION RESPIRATORY (INHALATION)
Status: DISCONTINUED | OUTPATIENT
Start: 2019-02-08 | End: 2019-02-09 | Stop reason: HOSPADM

## 2019-02-08 RX ORDER — HEPARIN SODIUM 5000 [USP'U]/ML
5000 INJECTION, SOLUTION INTRAVENOUS; SUBCUTANEOUS EVERY 8 HOURS
Status: DISCONTINUED | OUTPATIENT
Start: 2019-02-08 | End: 2019-02-09 | Stop reason: HOSPADM

## 2019-02-08 RX ORDER — INSULIN LISPRO 100 [IU]/ML
INJECTION, SOLUTION INTRAVENOUS; SUBCUTANEOUS
Status: DISCONTINUED | OUTPATIENT
Start: 2019-02-08 | End: 2019-02-09 | Stop reason: HOSPADM

## 2019-02-08 RX ORDER — TAMSULOSIN HYDROCHLORIDE 0.4 MG/1
0.4 CAPSULE ORAL DAILY
Status: DISCONTINUED | OUTPATIENT
Start: 2019-02-09 | End: 2019-02-09 | Stop reason: HOSPADM

## 2019-02-08 RX ORDER — MAGNESIUM SULFATE HEPTAHYDRATE 40 MG/ML
2 INJECTION, SOLUTION INTRAVENOUS ONCE
Status: COMPLETED | OUTPATIENT
Start: 2019-02-08 | End: 2019-02-08

## 2019-02-08 RX ORDER — ALBUTEROL SULFATE 0.83 MG/ML
SOLUTION RESPIRATORY (INHALATION)
Status: DISPENSED
Start: 2019-02-08 | End: 2019-02-09

## 2019-02-08 RX ORDER — MAGNESIUM SULFATE 100 %
4 CRYSTALS MISCELLANEOUS AS NEEDED
Status: DISCONTINUED | OUTPATIENT
Start: 2019-02-08 | End: 2019-02-09 | Stop reason: HOSPADM

## 2019-02-08 RX ORDER — DEXTROSE 50 % IN WATER (D50W) INTRAVENOUS SYRINGE
25-50 AS NEEDED
Status: DISCONTINUED | OUTPATIENT
Start: 2019-02-08 | End: 2019-02-09 | Stop reason: HOSPADM

## 2019-02-08 RX ORDER — ALBUTEROL SULFATE 0.83 MG/ML
5 SOLUTION RESPIRATORY (INHALATION)
Status: COMPLETED | OUTPATIENT
Start: 2019-02-08 | End: 2019-02-08

## 2019-02-08 RX ADMIN — METHYLPREDNISOLONE SODIUM SUCCINATE 125 MG: 125 INJECTION, POWDER, FOR SOLUTION INTRAMUSCULAR; INTRAVENOUS at 12:38

## 2019-02-08 RX ADMIN — MAGNESIUM SULFATE HEPTAHYDRATE 2 G: 40 INJECTION, SOLUTION INTRAVENOUS at 12:38

## 2019-02-08 RX ADMIN — INSULIN LISPRO 4 UNITS: 100 INJECTION, SOLUTION INTRAVENOUS; SUBCUTANEOUS at 21:32

## 2019-02-08 RX ADMIN — HYDROCODONE POLISTIREX AND CHLORPHENIRAMINE POLISTIREX 5 ML: 10; 8 SUSPENSION, EXTENDED RELEASE ORAL at 16:57

## 2019-02-08 RX ADMIN — IPRATROPIUM BROMIDE AND ALBUTEROL SULFATE 3 ML: .5; 3 SOLUTION RESPIRATORY (INHALATION) at 12:40

## 2019-02-08 RX ADMIN — GUAIFENESIN AND DEXTROMETHORPHAN 10 ML: 100; 10 SYRUP ORAL at 21:49

## 2019-02-08 RX ADMIN — CEFTRIAXONE SODIUM 1 G: 1 INJECTION, POWDER, FOR SOLUTION INTRAMUSCULAR; INTRAVENOUS at 16:57

## 2019-02-08 RX ADMIN — IOPAMIDOL 100 ML: 755 INJECTION, SOLUTION INTRAVENOUS at 16:15

## 2019-02-08 RX ADMIN — METHYLPREDNISOLONE SODIUM SUCCINATE 40 MG: 40 INJECTION, POWDER, FOR SOLUTION INTRAMUSCULAR; INTRAVENOUS at 21:32

## 2019-02-08 RX ADMIN — ALBUTEROL SULFATE 5 MG: 2.5 SOLUTION RESPIRATORY (INHALATION) at 17:56

## 2019-02-08 RX ADMIN — IPRATROPIUM BROMIDE AND ALBUTEROL SULFATE 3 ML: .5; 3 SOLUTION RESPIRATORY (INHALATION) at 14:44

## 2019-02-08 RX ADMIN — HEPARIN SODIUM 5000 UNITS: 5000 INJECTION INTRAVENOUS; SUBCUTANEOUS at 18:40

## 2019-02-08 RX ADMIN — IPRATROPIUM BROMIDE AND ALBUTEROL SULFATE 3 ML: .5; 3 SOLUTION RESPIRATORY (INHALATION) at 20:28

## 2019-02-08 RX ADMIN — IPRATROPIUM BROMIDE AND ALBUTEROL SULFATE 3 ML: .5; 3 SOLUTION RESPIRATORY (INHALATION) at 16:38

## 2019-02-08 RX ADMIN — SODIUM CHLORIDE 500 MG: 900 INJECTION, SOLUTION INTRAVENOUS at 17:41

## 2019-02-08 RX ADMIN — IPRATROPIUM BROMIDE AND ALBUTEROL SULFATE 3 ML: .5; 3 SOLUTION RESPIRATORY (INHALATION) at 13:40

## 2019-02-08 NOTE — ED PROVIDER NOTES
EMERGENCY DEPARTMENT HISTORY AND PHYSICAL EXAM    Date: 2/8/2019  Patient Name: Nikos Jain    History of Presenting Illness     Chief Complaint   Patient presents with    Shortness of Breath         History Provided By: Patient and patient's significant other    Chief Complaint: worsening SOB  Duration: 1 week ago  Timing:  Worsening  Location: respiratory, chest, bilateral leg swelling  Associated Symptoms: wheezing, bilateral leg swelling, and productive cough (greenish/yellow phlegm, atypical of normal cough)    Additional History (Context):   12:26 PM   Nikos Jain is a 74 y.o. male with PMHX of COPD and prostate cancer (not removed) who presents to the emergency department C/O worsening SOB onset 1 week ago. Associated sxs include wheezing, bilateral leg swelling, and productive cough (greenish/yellow phlegm, atypical of normal cough). Pt did receive flu and PNA vaccination this year. Pt endorses tobacco use (3 cigarettes/day). Pt follows up with Dr. Fabby Nur for pulmonary issues. Pt takes Albuterol and Lasix medication. Pt denies CP, fever, DM, and any other sxs or complaints.      PCP: Chacha Gomes MD    Current Facility-Administered Medications   Medication Dose Route Frequency Provider Last Rate Last Dose    albuterol (PROVENTIL VENTOLIN) 2.5 mg /3 mL (0.083 %) nebulizer solution             azithromycin (ZITHROMAX) 500 mg in 0.9% sodium chloride 250 mL IVPB  500 mg IntraVENous NOW Dionne Beckwith PA-C 250 mL/hr at 02/08/19 1741 500 mg at 02/08/19 1741    methylPREDNISolone (PF) (SOLU-MEDROL) injection 40 mg  40 mg IntraVENous Q12H Erick Stack, DO        azithromycin (ZITHROMAX) 500 mg in 0.9% sodium chloride 250 mL IVPB  500 mg IntraVENous Q24H Erick Stack, DO        albuterol-ipratropium (DUO-NEB) 2.5 MG-0.5 MG/3 ML  3 mL Nebulization QID RT Erick Stack, DO        albuterol (PROVENTIL VENTOLIN) nebulizer solution 2.5 mg  2.5 mg Nebulization Q2H PRN Erick Stack, DO        acetaminophen (TYLENOL) tablet 650 mg  650 mg Oral Q4H PRN Odetta Ariel, DO        ondansetron [facility]) injection 4 mg  4 mg IntraVENous Q4H PRN Odetta Ariel, DO        heparin (porcine) injection 5,000 Units  5,000 Units SubCUTAneous Q8H Odetta Ariel, DO        insulin lispro (HUMALOG) injection   SubCUTAneous AC&HS Odetta Ariel, DO        glucose chewable tablet 16 g  4 Tab Oral PRN Odetta Ariel, DO        glucagon (GLUCAGEN) injection 1 mg  1 mg IntraMUSCular PRN Odetta Ariel, DO        dextrose (D50W) injection syrg 12.5-25 g  25-50 mL IntraVENous PRN Odetta Ariel, DO         Current Outpatient Medications   Medication Sig Dispense Refill    fluticasone furoate (ARNUITY ELLIPTA) 100 mcg/actuation dsdv inhaler Take 1 Puff by inhalation daily. 1 Inhaler 0    acetaminophen (TYLENOL ARTHRITIS PAIN) 650 mg TbER Take 1 Tab by mouth every eight (8) hours. 15 Tab 0    furosemide (LASIX) 20 mg tablet Take 20 mg by mouth daily.  albuterol (PROVENTIL HFA, VENTOLIN HFA, PROAIR HFA) 90 mcg/actuation inhaler Take 2 Puffs by inhalation every four (4) hours as needed for Wheezing or Shortness of Breath. 1 Inhaler 1    ipratropium (ATROVENT) 0.03 % nasal spray 2 Sprays every twelve (12) hours.  tamsulosin (FLOMAX) 0.4 mg capsule Take 0.4 mg by mouth daily.  albuterol (PROVENTIL VENTOLIN) 2.5 mg /3 mL (0.083 %) nebulizer solution 3 mL by Nebulization route every four (4) hours as needed for Wheezing. 24 Each 0    chlorthalidone (HYGROTEN) 25 mg tablet Take  by mouth daily.       Nebulizer & Compressor machine Dispense: 1 nebulizer machine w all tubing necessary 1 each 0       Past History     Past Medical History:  Past Medical History:   Diagnosis Date    Cancer ([facility] [address]     prostate    COPD (chronic obstructive pulmonary disease) ([facility] [address]     Hypertension     Pneumonia     Radiation effect        Past Surgical History:  Past Surgical History:   Procedure Laterality Date    HX HERNIA REPAIR         Family History:  Family History   Problem Relation Age of Onset    Heart Disease Mother        Social History:  Social History     Tobacco Use    Smoking status: Former Smoker     Types: Cigarettes    Smokeless tobacco: Never Used   Substance Use Topics    Alcohol use: No    Drug use: Not on file       Allergies: Allergies   Allergen Reactions    Aspirin Other (comments)     \"messes my stomach up\"         Review of Systems   Review of Systems   Constitutional: Negative for chills and fever. HENT: Negative for congestion and sore throat. Respiratory: Positive for cough (productive, (greenish/yellow phlegm, atypical of normal cough)), shortness of breath (worsening) and wheezing. Cardiovascular: Positive for leg swelling (bilateral). Negative for chest pain and palpitations. Gastrointestinal: Negative for abdominal pain, nausea and vomiting. Musculoskeletal: Negative for myalgias. Allergic/Immunologic: Negative for environmental allergies. Neurological: Negative for headaches. All other systems reviewed and are negative. Physical Exam     Vitals:    02/08/19 1638 02/08/19 1700 02/08/19 1730 02/08/19 1801   BP:  147/67 152/71 144/71   Pulse:  97 97 97   Resp:  20 17 20   Temp:    98.4 °F (36.9 °C)   SpO2: 95% 98% 99% 97%   Weight:       Height:         Physical Exam   Constitutional: He is oriented to person, place, and time. He appears well-developed and well-nourished. No distress.  geriatric male in resp distress. Acc muscle use. HENT:   Head: Normocephalic and atraumatic. Head is without right periorbital erythema and without left periorbital erythema. Right Ear: External ear normal.   Left Ear: External ear normal.   Nose: Mucosal edema present. No rhinorrhea. Mouth/Throat: Uvula is midline, oropharynx is clear and moist and mucous membranes are normal.   Eyes: Conjunctivae are normal.   Neck: Normal range of motion.    Cardiovascular: Normal rate, regular rhythm, normal heart sounds and intact distal pulses. Exam reveals no gallop and no friction rub. No murmur heard. Pulmonary/Chest: Accessory muscle usage present. Tachypnea noted. He is in respiratory distress. He has no decreased breath sounds. He has wheezes in the right upper field, the right middle field, the right lower field, the left upper field, the left middle field and the left lower field. He has rhonchi. He has no rales. Musculoskeletal: Normal range of motion. Neurological: He is alert and oriented to person, place, and time. Skin: Skin is warm and dry. He is not diaphoretic. Psychiatric: He has a normal mood and affect. Judgment normal.   Nursing note and vitals reviewed.         Diagnostic Study Results     Labs -     Recent Results (from the past 12 hour(s))   EKG, 12 LEAD, INITIAL    Collection Time: 02/08/19 12:15 PM   Result Value Ref Range    Ventricular Rate 102 BPM    Atrial Rate 102 BPM    P-R Interval 144 ms    QRS Duration 76 ms    Q-T Interval 330 ms    QTC Calculation (Bezet) 430 ms    Calculated P Axis 69 degrees    Calculated R Axis 55 degrees    Calculated T Axis 78 degrees    Diagnosis       Sinus tachycardia with premature atrial complexes with aberrant conduction  Nonspecific ST abnormality  Abnormal ECG  When compared with ECG of 19-OCT-2018 00:28,  premature ventricular complexes are no longer present  aberrant conduction is now present  T wave inversion now evident in Anterior leads     CBC WITH AUTOMATED DIFF    Collection Time: 02/08/19 12:22 PM   Result Value Ref Range    WBC 6.3 4.6 - 13.2 K/uL    RBC 3.82 (L) 4.70 - 5.50 M/uL    HGB 10.7 (L) 13.0 - 16.0 g/dL    HCT 33.3 (L) 36.0 - 48.0 %    MCV 87.2 74.0 - 97.0 FL    MCH 28.0 24.0 - 34.0 PG    MCHC 32.1 31.0 - 37.0 g/dL    RDW 13.9 11.6 - 14.5 %    PLATELET 897 (H) 629 - 420 K/uL    MPV 8.7 (L) 9.2 - 11.8 FL    NEUTROPHILS 63 40 - 73 %    LYMPHOCYTES 14 (L) 21 - 52 %    MONOCYTES 9 3 - 10 %    EOSINOPHILS 13 (H) 0 - 5 %    BASOPHILS 1 0 - 2 %    ABS. NEUTROPHILS 4.0 1.8 - 8.0 K/UL    ABS. LYMPHOCYTES 0.9 0.9 - 3.6 K/UL    ABS. MONOCYTES 0.6 0.05 - 1.2 K/UL    ABS. EOSINOPHILS 0.8 (H) 0.0 - 0.4 K/UL    ABS. BASOPHILS 0.1 0.0 - 0.1 K/UL    DF AUTOMATED     METABOLIC PANEL, COMPREHENSIVE    Collection Time: 02/08/19 12:22 PM   Result Value Ref Range    Sodium 134 (L) 136 - 145 mmol/L    Potassium 4.4 3.5 - 5.5 mmol/L    Chloride 97 (L) 100 - 108 mmol/L    CO2 27 21 - 32 mmol/L    Anion gap 10 3.0 - 18 mmol/L    Glucose 100 (H) 74 - 99 mg/dL    BUN 10 7.0 - 18 MG/DL    Creatinine 1.22 0.6 - 1.3 MG/DL    BUN/Creatinine ratio 8 (L) 12 - 20      GFR est AA >60 >60 ml/min/1.73m2    GFR est non-AA 58 (L) >60 ml/min/1.73m2    Calcium 9.0 8.5 - 10.1 MG/DL    Bilirubin, total 0.4 0.2 - 1.0 MG/DL    ALT (SGPT) 12 (L) 16 - 61 U/L    AST (SGOT) 15 15 - 37 U/L    Alk.  phosphatase 162 (H) 45 - 117 U/L    Protein, total 6.4 6.4 - 8.2 g/dL    Albumin 2.7 (L) 3.4 - 5.0 g/dL    Globulin 3.7 2.0 - 4.0 g/dL    A-G Ratio 0.7 (L) 0.8 - 1.7     MAGNESIUM    Collection Time: 02/08/19 12:22 PM   Result Value Ref Range    Magnesium 1.6 1.6 - 2.6 mg/dL   NT-PRO BNP    Collection Time: 02/08/19 12:22 PM   Result Value Ref Range    NT pro- 0 - 1,800 PG/ML   CARDIAC PANEL,(CK, CKMB & TROPONIN)    Collection Time: 02/08/19 12:22 PM   Result Value Ref Range     39 - 308 U/L    CK - MB 9.5 (H) <3.6 ng/ml    CK-MB Index 4.1 (H) 0.0 - 4.0 %    Troponin-I, QT <0.02 0.0 - 0.045 NG/ML   D DIMER    Collection Time: 02/08/19 12:22 PM   Result Value Ref Range    D DIMER 1.10 (H) <0.46 ug/ml(FEU)   INFLUENZA A & B AG (RAPID TEST)    Collection Time: 02/08/19 12:40 PM   Result Value Ref Range    Influenza A Antigen NEGATIVE  NEG      Influenza B Antigen NEGATIVE  NEG     POC LACTIC ACID    Collection Time: 02/08/19 12:53 PM   Result Value Ref Range    Lactic Acid (POC) 1.60 0.40 - 2.00 mmol/L       Radiologic Studies -   XR CHEST PORT   Final Result Impression:   No radiographic evidence of an acute abnormality. CXR Results  (Last 48 hours)               02/08/19 1336  XR CHEST PORT Final result    Impression:  Impression:   No radiographic evidence of an acute abnormality. Narrative:  Chest, single view       Indication: Shortness of breath       Comparison: Several prior exams, most recently 10/19/2018       Findings:  Portable upright AP view of the chest was obtained. Volume loss in   the right hemithorax redemonstrated with calcified pleural densities. No focal   pneumonic opacity, pneumothorax, or pleural effusion. Normal cardiac size and   mediastinal contours. No acute osseous abnormality.                  Medications given in the ED-  Medications   azithromycin (ZITHROMAX) 500 mg in 0.9% sodium chloride 250 mL IVPB (500 mg IntraVENous New Bag 2/8/19 7061)   albuterol (PROVENTIL VENTOLIN) 2.5 mg /3 mL (0.083 %) nebulizer solution (not administered)   methylPREDNISolone (PF) (SOLU-MEDROL) injection 40 mg (not administered)   azithromycin (ZITHROMAX) 500 mg in 0.9% sodium chloride 250 mL IVPB (not administered)   albuterol-ipratropium (DUO-NEB) 2.5 MG-0.5 MG/3 ML (not administered)   albuterol (PROVENTIL VENTOLIN) nebulizer solution 2.5 mg (not administered)   acetaminophen (TYLENOL) tablet 650 mg (not administered)   ondansetron (ZOFRAN) injection 4 mg (not administered)   heparin (porcine) injection 5,000 Units (not administered)   insulin lispro (HUMALOG) injection (not administered)   glucose chewable tablet 16 g (not administered)   glucagon (GLUCAGEN) injection 1 mg (not administered)   dextrose (D50W) injection syrg 12.5-25 g (not administered)   albuterol-ipratropium (DUO-NEB) 2.5 MG-0.5 MG/3 ML (3 mL Nebulization Given 2/8/19 1240)   methylPREDNISolone (PF) (Solu-MEDROL) injection 125 mg (125 mg IntraVENous Given 2/8/19 1238)   magnesium sulfate 2 g/50 ml IVPB (premix or compounded) (0 g IntraVENous IV Completed 2/8/19 1338) albuterol-ipratropium (DUO-NEB) 2.5 MG-0.5 MG/3 ML (3 mL Nebulization Given 2/8/19 1340)   albuterol-ipratropium (DUO-NEB) 2.5 MG-0.5 MG/3 ML (3 mL Nebulization Given 2/8/19 1444)   iopamidol (ISOVUE-370) 76 % injection 100 mL (100 mL IntraVENous Given 2/8/19 1615)   albuterol-ipratropium (DUO-NEB) 2.5 MG-0.5 MG/3 ML (3 mL Nebulization Given 2/8/19 1638)   chlorpheniramine-HYDROcodone (TUSSIONEX) oral suspension 5 mL (5 mL Oral Given 2/8/19 1657)   cefTRIAXone (ROCEPHIN) 1 g in sterile water (preservative free) 10 mL IV syringe (1 g IntraVENous Given 2/8/19 1657)   albuterol (PROVENTIL VENTOLIN) nebulizer solution 5 mg (5 mg Nebulization Given 2/8/19 1756)         Medical Decision Making   I am the first provider for this patient. I reviewed the vital signs, available nursing notes, past medical history, past surgical history, family history and social history. Vital Signs-Reviewed the patient's vital signs. Pulse Oximetry Analysis - 98% on RA     Cardiac Monitor:  Rate: 103 bpm  Rhythm: Sinus tachycardia    EKG interpretation: (Preliminary)  12:15 PM   Sinus tachycardia with PAC's with aberrant conduction at 102 bpm. Nonspecific ST abnormality. EKG read by Lola Cartwright DO at 12:16 PM    Records Reviewed: Nursing Notes and Old Medical Records    Provider Notes (Medical Decision Making): COPD, CHF, ACS/MI, PE, arrhyhtmia, PNA, bronchitis, asthma, malignancy    Procedures:  Procedures    ED Course:   12:26 PM Initial assessment performed. The patients presenting problems have been discussed, and they are in agreement with the care plan formulated and outlined with them. I have encouraged them to ask questions as they arise throughout their visit. 1:35 PM After 2 nebs, breathing is better. Still wheezing, but improving.    2:22 PM Still wheezing, will give additional nebs    4:26 PM Attempted CTA, but when pt lays flat, he has coughing fits.  Will give PO Tussinex, breathing tx, place pt on oxygen, and linda.  5:42 PM Second attempt at CT. Pt is now refusing as he states hes having coughing fits and does not want study done so will admit pt for COPD exacerbation. Duplex BLE negative for DVT  5:45 PM Discussed patient's history, exam, and available diagnostics results with Thierry Mendoza DO, internal medicine, who agrees with admitting pt to observation telemetry overnight. Diagnosis and Disposition     Critical Care Time:   5:47 PM  I have spent 47 minutes of critical care time involved in lab review, consultations with specialist, family decision-making, and documentation. During this entire length of time I was immediately available to the patient. Critical Care: The reason for providing this level of medical care for this critically ill patient was due a critical illness that impaired one or more vital organ systems such that there was a high probability of imminent or life threatening deterioration in the patients condition. This care involved high complexity decision making to assess, manipulate, and support vital system functions, to treat this degree vital organ system failure and to prevent further life threatening deterioration of the patients condition. Core Measures:  For Hospitalized Patients:    1. Hospitalization Decision Time:  The decision to hospitalize the patient was made by Cruzito Rangel PA-C at 5:45 PM on 2/8/2019    2. Aspirin: Aspirin was not given because the patient did not present with a stroke at the time of their Emergency Department evaluation    5:46 PM  Patient is being admitted to the hospital by Thierry Mendoza DO. The results of their tests and reasons for their admission have been discussed with them and/or available family. They convey agreement and understanding for the need to be admitted and for their admission diagnosis. CONDITIONS ON ADMISSION:  Sepsis is not present at the time of admission. Deep Vein Thrombosis is not present at the time of admission. Thrombosis is not present at the time of admission. Urinary Tract Infection is not present at the time of admission. Pneumonia is not present at the time of admission. MRSA is not present at the time of admission. Wound infection is not present at the time of admission. Pressure Ulcer is not present at the time of admission. CLINICAL IMPRESSION:  1. Acute exacerbation of chronic obstructive pulmonary disease (COPD) ([address]         PLAN: ADMIT TO OBSERVATIONAL TELEMETRY OVERNIGHT    _____________________________    Attestations: This note is prepared by Leroy Ocampo and Sulma Farmer, acting as Scribes for Elijah Lugo PA-C. Elijah Lugo PA-C:  The scribe's documentation has been prepared under my direction and personally reviewed by me in its entirety.   I confirm that the note above accurately reflects all work, treatment, procedures, and medical decision making performed by me.  _______________________________

## 2019-02-08 NOTE — ROUTINE PROCESS
TRANSFER - OUT REPORT:    Verbal report given to [address] [place] [address] on Fernanda Mcelroy  being transferred to telemetry unit  for routine progression of care       Report consisted of patients Situation, Background, Assessment and   Recommendations(SBAR). Information from the following report(s) SBAR, ED Summary, Recent Results, Med Rec Status and Cardiac Rhythm SR was reviewed with the receiving nurse. Lines:   Peripheral IV 02/08/19 Right Antecubital (Active)   Dressing Status Clean, dry, & intact 2/8/2019 12:22 PM   Dressing Type Transparent 2/8/2019 12:22 PM   Hub Color/Line Status Pink;Flushed 2/8/2019 12:22 PM   Action Taken Blood drawn 2/8/2019 12:22 PM        Opportunity for questions and clarification was provided.       Patient transported with:   Monitor  O2 @ 2 liters  Registered Nurse

## 2019-02-08 NOTE — ED NOTES
Telephone report given to [facility] telemetry RN, all questions answered at this time

## 2019-02-08 NOTE — H&P
History & Physical    Patient: Yonas Crowell MRN: [id number]  CSN: [id number]    YOB: 1945  Age: 74 y.o. Sex: male      DOA: 2/8/2019  Primary Care Provider:  Matilda Holm MD      Assessment/Plan   1. Acute exacerbation of COPD  2. Hypoxia  3. HTN  4. Tobacco dependence  5. BPH  6. Elevated Ddimer    PLAN:  - Admit to medical service with telemetry monitoring  - IV steroids, abx, scheduled duonebs w albuterol q2 prn dyspnea, wheezing  - trial again for CTA chest rule out PE, doubt clinically and has had negative PVLs LE  - continue home dose diuretics  - flomax  - monitor sugars while on steroids  - full code, dvt ppx heparin      Patient Active Problem List   Diagnosis Code    COPD (chronic obstructive pulmonary disease) ([facility]) J44.9    Hypertension I10    Tobacco abuse Z72.0    COPD (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 ([facility]) J44.9    Chronic renal disease, stage 3, moderately decreased glomerular filtration rate between 30-59 mL/min/1.73 square meter ([facility]) N18.3    COPD exacerbation ([facility]) J44.1    Asbestosis ([address] J61    Acute exacerbation of chronic obstructive pulmonary disease (COPD) ([facility] [address] J44.1     HPI:   CC:cough, shortness of breath   Yonas Crowell is a 74 y.o. male with past medical history significant for COPD, asbetsosi, BPH presents to the ER with 1 week of worsenign shortness of breath. Symptoms are associated w dry cough and wheezing. Worse when laying flat or exerting himself, no alleviating factors. He denies chest pain, pressure, significant LE edema, but has had generalized malaise. On presentation tot he ER he was afebrile, normotensive with out hypoxia. Exam revealed wheezing bilaterally. CXR clear, EKG with out acute findings. Labs w elevated Ddimer, normal wbc, mild hyponatremia. PVL negative for DVT. Medicine is asked to admit for further management.        Past Medical History:   Diagnosis Date    Cancer [facility])     prostate    COPD [facility]chronic obstructive pulmonary disease) ([facility] [address]     Hypertension     Pneumonia     Radiation effect      Past Surgical History:   Procedure Laterality Date    HX HERNIA REPAIR        Social History     Tobacco Use    Smoking status: Former Smoker     Types: Cigarettes    Smokeless tobacco: Never Used   Substance Use Topics    Alcohol use: No    Drug use: Not on file     Family History   Problem Relation Age of Onset    Heart Disease Mother      No current facility-administered medications on file prior to encounter. Current Outpatient Medications on File Prior to Encounter   Medication Sig Dispense Refill    fluticasone furoate (ARNUITY ELLIPTA) 100 mcg/actuation dsdv inhaler Take 1 Puff by inhalation daily. 1 Inhaler 0    acetaminophen (TYLENOL ARTHRITIS PAIN) 650 mg TbER Take 1 Tab by mouth every eight (8) hours. 15 Tab 0    furosemide (LASIX) 20 mg tablet Take 20 mg by mouth daily.  albuterol (PROVENTIL HFA, VENTOLIN HFA, PROAIR HFA) 90 mcg/actuation inhaler Take 2 Puffs by inhalation every four (4) hours as needed for Wheezing or Shortness of Breath. 1 Inhaler 1    ipratropium (ATROVENT) 0.03 % nasal spray 2 Sprays every twelve (12) hours.  tamsulosin (FLOMAX) 0.4 mg capsule Take 0.4 mg by mouth daily.  albuterol (PROVENTIL VENTOLIN) 2.5 mg /3 mL (0.083 %) nebulizer solution 3 mL by Nebulization route every four (4) hours as needed for Wheezing. 24 Each 0    chlorthalidone (HYGROTEN) 25 mg tablet Take  by mouth daily.       Nebulizer & Compressor machine Dispense: 1 nebulizer machine w all tubing necessary 1 each 0      Allergies   Allergen Reactions    Aspirin Other (comments)     \"messes my stomach up\"       Review of Systems  Constitutional: No fever, chills, diaphoresis,+ malaise, +fatigue - weight gain/loss or falls  Skin: no itching or rashes  HEENT: no neck stiffness, hearing loss, tinnitus, epistaxis or sore throat  EYES: no blurry vision, double vision or photophobia  CARDIOVASCULAR: no, cp, palpitations, orthopnea, pnd or LE edema  PULMONARY: + cough, +wheeze, +shortness of breath - sputum production  GI: no nausea, vomiting, diarrhea, abdominal pain, melena, hematemesis or brbpr  : no dysuria, hematuria  MUSCULOSKELETAL: no back pain, joint pain or myalgias  ENDOCRINE: no heat/cold intolerance, polyuria or polydipsia  HEME: no easy bruising or bleeding  NEURO: no unilateral weakness, numbness, tingling or seizures      Physical Exam:        Visit Vitals  /71 (BP 1 Location: Right arm, BP Patient Position: At rest)   Pulse 97   Temp 98.4 °F (36.9 °C)   Resp 20   Ht 5' 8\" (1.727 m)   Wt 93 kg (205 lb)   SpO2 97%   BMI 31.17 kg/m²    O2 Flow Rate (L/min): 2 l/min O2 Device: Nasal cannula    Temp (24hrs), Av.2 °F (36.8 °C), Min:97.9 °F (36.6 °C), Max:98.4 °F (36.9 °C)     07 -  1900  In: 50 [I.V.:50]  Out: 300 [Urine:300]   No intake/output data recorded. Body mass index is 31.17 kg/m². General:  Awake, cooperative, no distress. Head:  Normocephalic, without obvious abnormality, atraumatic. Eyes:  Conjunctivae/corneas clear, sclera anicteric, PERRL, EOMs intact. Nose: Nares normal. No drainage or sinus tenderness. Throat: Lips, mucosa, and tongue normal. .   Neck: Supple, symmetrical, trachea midline, no adenopathy. Lungs:   Wheezes bilaterally, no rales, equal expansion       Heart:  Regular rate and rhythm, S1, S2 normal, no murmur, click, rub or gallop, cap refill normal      Abdomen: Soft, non-tender. Bowel sounds normal. No masses,  No organomegaly. Extremities: Extremities normal, atraumatic, no cyanosis or edema. Pulses: 2+ and symmetric all extremities. Skin: Skin color pale, texture, turgor normal. No rashes or lesions   Neurologic: CNII-XII intact. No focal motor or sensory deficit.            Laboratory Studies:    CMP:   Lab Results   Component Value Date/Time     (L) 2019 12:22 PM    K 4.4 02/08/2019 12:22 PM    CL 97 (L) 02/08/2019 12:22 PM    CO2 27 02/08/2019 12:22 PM    AGAP 10 02/08/2019 12:22 PM     (H) 02/08/2019 12:22 PM    BUN 10 02/08/2019 12:22 PM    CREA 1.22 02/08/2019 12:22 PM    GFRAA >60 02/08/2019 12:22 PM    GFRNA 58 (L) 02/08/2019 12:22 PM    CA 9.0 02/08/2019 12:22 PM    MG 1.6 02/08/2019 12:22 PM    ALB 2.7 (L) 02/08/2019 12:22 PM    TP 6.4 02/08/2019 12:22 PM    GLOB 3.7 02/08/2019 12:22 PM    AGRAT 0.7 (L) 02/08/2019 12:22 PM    SGOT 15 02/08/2019 12:22 PM    ALT 12 (L) 02/08/2019 12:22 PM     CBC:   Lab Results   Component Value Date/Time    WBC 6.3 02/08/2019 12:22 PM    HGB 10.7 (L) 02/08/2019 12:22 PM    HCT 33.3 (L) 02/08/2019 12:22 PM     (H) 02/08/2019 12:22 PM       Imaging studies personally reviewed:  CXR: clear  EKG: nothing acute      Christopher Esqueda DO  Internal Medicine/Geriatrics

## 2019-02-08 NOTE — ED TRIAGE NOTES
Pt ambulated into er with wife for complaints of shortness of breath x 3 days.  Pt with history of copd

## 2019-02-09 VITALS
WEIGHT: 201.5 LBS | TEMPERATURE: 98 F | DIASTOLIC BLOOD PRESSURE: 79 MMHG | HEIGHT: 68 IN | SYSTOLIC BLOOD PRESSURE: 140 MMHG | RESPIRATION RATE: 19 BRPM | BODY MASS INDEX: 30.54 KG/M2 | HEART RATE: 94 BPM | OXYGEN SATURATION: 94 %

## 2019-02-09 LAB
ANION GAP SERPL CALC-SCNC: 9 MMOL/L (ref 3–18)
BASOPHILS # BLD: 0 K/UL (ref 0–0.1)
BASOPHILS NFR BLD: 0 % (ref 0–2)
BUN SERPL-MCNC: 13 MG/DL (ref 7–18)
BUN/CREAT SERPL: 11 (ref 12–20)
CALCIUM SERPL-MCNC: 8.5 MG/DL (ref 8.5–10.1)
CHLORIDE SERPL-SCNC: 95 MMOL/L (ref 100–108)
CO2 SERPL-SCNC: 26 MMOL/L (ref 21–32)
CREAT SERPL-MCNC: 1.23 MG/DL (ref 0.6–1.3)
DIFFERENTIAL METHOD BLD: ABNORMAL
EOSINOPHIL # BLD: 0 K/UL (ref 0–0.4)
EOSINOPHIL NFR BLD: 0 % (ref 0–5)
ERYTHROCYTE [DISTWIDTH] IN BLOOD BY AUTOMATED COUNT: 13.4 % (ref 11.6–14.5)
GLUCOSE BLD STRIP.AUTO-MCNC: 142 MG/DL (ref 70–110)
GLUCOSE SERPL-MCNC: 140 MG/DL (ref 74–99)
HCT VFR BLD AUTO: 31.2 % (ref 36–48)
HGB BLD-MCNC: 10.1 G/DL (ref 13–16)
LYMPHOCYTES # BLD: 0.4 K/UL (ref 0.9–3.6)
LYMPHOCYTES NFR BLD: 10 % (ref 21–52)
MCH RBC QN AUTO: 27.8 PG (ref 24–34)
MCHC RBC AUTO-ENTMCNC: 32.4 G/DL (ref 31–37)
MCV RBC AUTO: 86 FL (ref 74–97)
MONOCYTES # BLD: 0 K/UL (ref 0.05–1.2)
MONOCYTES NFR BLD: 1 % (ref 3–10)
NEUTS SEG # BLD: 3.6 K/UL (ref 1.8–8)
NEUTS SEG NFR BLD: 89 % (ref 40–73)
PLATELET # BLD AUTO: 439 K/UL (ref 135–420)
PMV BLD AUTO: 8.7 FL (ref 9.2–11.8)
POTASSIUM SERPL-SCNC: 4.9 MMOL/L (ref 3.5–5.5)
RBC # BLD AUTO: 3.63 M/UL (ref 4.7–5.5)
SODIUM SERPL-SCNC: 130 MMOL/L (ref 136–145)
WBC # BLD AUTO: 4 K/UL (ref 4.6–13.2)

## 2019-02-09 PROCEDURE — 74011250637 HC RX REV CODE- 250/637: Performed by: INTERNAL MEDICINE

## 2019-02-09 PROCEDURE — 94760 N-INVAS EAR/PLS OXIMETRY 1: CPT

## 2019-02-09 PROCEDURE — 94640 AIRWAY INHALATION TREATMENT: CPT

## 2019-02-09 PROCEDURE — 96372 THER/PROPH/DIAG INJ SC/IM: CPT

## 2019-02-09 PROCEDURE — 36415 COLL VENOUS BLD VENIPUNCTURE: CPT

## 2019-02-09 PROCEDURE — 74011000250 HC RX REV CODE- 250: Performed by: INTERNAL MEDICINE

## 2019-02-09 PROCEDURE — 99218 HC RM OBSERVATION: CPT

## 2019-02-09 PROCEDURE — 80048 BASIC METABOLIC PNL TOTAL CA: CPT

## 2019-02-09 PROCEDURE — 85025 COMPLETE CBC W/AUTO DIFF WBC: CPT

## 2019-02-09 PROCEDURE — 77010033678 HC OXYGEN DAILY

## 2019-02-09 PROCEDURE — 82962 GLUCOSE BLOOD TEST: CPT

## 2019-02-09 PROCEDURE — 74011250636 HC RX REV CODE- 250/636: Performed by: INTERNAL MEDICINE

## 2019-02-09 PROCEDURE — 96376 TX/PRO/DX INJ SAME DRUG ADON: CPT

## 2019-02-09 RX ORDER — HYDROCODONE POLISTIREX AND CHLORPHENIRAMINE POLISTIREX 10; 8 MG/5ML; MG/5ML
5 SUSPENSION, EXTENDED RELEASE ORAL
Qty: 60 ML | Refills: 0 | Status: SHIPPED | OUTPATIENT
Start: 2019-02-09 | End: 2019-07-12

## 2019-02-09 RX ORDER — AZITHROMYCIN 250 MG/1
250 TABLET, FILM COATED ORAL DAILY
Qty: 6 TAB | Refills: 0 | Status: SHIPPED | OUTPATIENT
Start: 2019-02-09 | End: 2019-07-06

## 2019-02-09 RX ORDER — ALBUTEROL SULFATE 0.83 MG/ML
2.5 SOLUTION RESPIRATORY (INHALATION)
Qty: 24 EACH | Refills: 0 | Status: SHIPPED | OUTPATIENT
Start: 2019-02-09 | End: 2019-07-12

## 2019-02-09 RX ORDER — IPRATROPIUM BROMIDE AND ALBUTEROL SULFATE 2.5; .5 MG/3ML; MG/3ML
3 SOLUTION RESPIRATORY (INHALATION)
Qty: 30 NEBULE | Refills: 0 | Status: SHIPPED | OUTPATIENT
Start: 2019-02-09 | End: 2020-05-05

## 2019-02-09 RX ORDER — PREDNISONE 20 MG/1
TABLET ORAL
Qty: 10 TAB | Refills: 0 | Status: ON HOLD | OUTPATIENT
Start: 2019-02-09 | End: 2019-07-09

## 2019-02-09 RX ORDER — BENZONATATE 100 MG/1
100 CAPSULE ORAL
Qty: 60 CAP | Refills: 0 | Status: SHIPPED | OUTPATIENT
Start: 2019-02-09 | End: 2019-02-16

## 2019-02-09 RX ADMIN — HEPARIN SODIUM 5000 UNITS: 5000 INJECTION INTRAVENOUS; SUBCUTANEOUS at 02:01

## 2019-02-09 RX ADMIN — IPRATROPIUM BROMIDE AND ALBUTEROL SULFATE 3 ML: .5; 3 SOLUTION RESPIRATORY (INHALATION) at 11:17

## 2019-02-09 RX ADMIN — TAMSULOSIN HYDROCHLORIDE 0.4 MG: 0.4 CAPSULE ORAL at 09:06

## 2019-02-09 RX ADMIN — FUROSEMIDE 20 MG: 20 TABLET ORAL at 09:06

## 2019-02-09 RX ADMIN — CHLORTHALIDONE 25 MG: 25 TABLET ORAL at 09:06

## 2019-02-09 RX ADMIN — IPRATROPIUM BROMIDE AND ALBUTEROL SULFATE 3 ML: .5; 3 SOLUTION RESPIRATORY (INHALATION) at 07:10

## 2019-02-09 RX ADMIN — METHYLPREDNISOLONE SODIUM SUCCINATE 40 MG: 40 INJECTION, POWDER, FOR SOLUTION INTRAMUSCULAR; INTRAVENOUS at 09:06

## 2019-02-09 NOTE — ROUTINE PROCESS
Dual AVS reviewed with Nery Lopez RN. All medications reviewed individually with patient. Opportunities for questions and concerns provided. Patient discharged via (mode of transport ie. Car, ambulance or air transport) wheelchair  Patient's arm band appropriately discarded.

## 2019-02-09 NOTE — ROUTINE PROCESS
Bedside and Verbal shift change report given to Shaq Ramos  (oncoming nurse) by Fransico Schirmer, RN  (offgoing nurse). Report given with SBAR, Kardex, Intake/Output and Recent Results.

## 2019-02-09 NOTE — DISCHARGE SUMMARY
Discharge Summary  Subjective:       Admit Date: 2/8/2019  Discharge Date:  2/9/2019, 9:53 AM    Discharging Physician: Beau Brandt pager [phone]  Primary Care Provider:  Alcira Alfaro MD    Patient ID:  Karen Germain  74 y.o. male  1945    Reason for Admission:  Acute exacerbation of chronic obstructive pulmonary disease (COPD) ([address] [J44.1]  Decompensated COPD with exacerbation (chronic obstructive pulmonary disease) ([address] [J44.1]    Discharge Diagnosis:   1. Acute exacerbation of COPD  2. Hypoxia  3. HTN  4. Tobacco dependence  5. BPH  6. Elevated Ddimer             Patient Active Problem List   Diagnosis Code    [facility]ch[facility] pulmonary disease) ([facility]) J44.9    Hypertension I10    Tobacco abuse Z72.0    COPD (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 ([facility]) J44.9    Chronic renal disease, stage 3, moderately decreased glomerular filtration rate between 30-59 mL/min/1.73 square meter ([facility]) N18.3    COPD exacerbation ([address] J44.1    Asbestosis ([address] J61    Acute exacerbation of chronic obstructive pulmonary disease (COPD) ([address] J44.1       Consultants:    none    Hospital Course:   Reason for admission ( Admitting HPI): \" Karen Germain is a 74 y.o. male with past medical history significant for COPD, asbetsosi, BPH presents to the ER with 1 week of worsenign shortness of breath. Symptoms are associated w dry cough and wheezing. Worse when laying flat or exerting himself, no alleviating factors. He denies chest pain, pressure, significant LE edema, but has had generalized malaise.     On presentation tot he ER he was afebrile, normotensive with out hypoxia. Exam revealed wheezing bilaterally. CXR clear, EKG with out acute findings. Labs w elevated Ddimer, normal wbc, mild hyponatremia. PVL negative for DVT. Medicine is asked to admit for further management. \"    He was admitted to the hospitalist service. He was started on IV steroids, nebs and antiboitics. He had improvement in his dyspnea and wheezing. His cough persisted. His oxygenation improved and he asks for DC home to continue his care. He reports he has nebulizer machine at home and can plan follow up with his PCP. Pertinent Imaging/ Operative procedures:   CXR:\" Impression:  No radiographic evidence of an acute abnormality. \"    Pertinent Results:    BMP:   Lab Results   Component Value Date/Time     (L) 02/09/2019 04:00 AM    K 4.9 02/09/2019 04:00 AM    CL 95 (L) 02/09/2019 04:00 AM    CO2 26 02/09/2019 04:00 AM    AGAP 9 02/09/2019 04:00 AM     (H) 02/09/2019 04:00 AM    BUN 13 02/09/2019 04:00 AM    CREA 1.23 02/09/2019 04:00 AM    GFRAA >60 02/09/2019 04:00 AM    GFRNA 57 (L) 02/09/2019 04:00 AM     CBC:   Lab Results   Component Value Date/Time    WBC 4.0 (L) 02/09/2019 04:00 AM    HGB 10.1 (L) 02/09/2019 04:00 AM    HCT 31.2 (L) 02/09/2019 04:00 AM     (H) 02/09/2019 04:00 AM         Physical Exam on Discharge:  Visit Vitals  /79 (BP 1 Location: Right arm, BP Patient Position: At rest)   Pulse 94   Temp 98 °F (36.7 °C)   Resp 19   Ht 5' 8\" (1.727 m)   Wt 91.4 kg (201 lb 8 oz)   SpO2 96%   BMI 30.64 kg/m²     Body mass index is 30.64 kg/m². GEN: NAD  HEART: S1 S2  NEURO: nonfocal  LUNGS: clear   Condition at discharge: stable    Discharge Medications  Current Discharge Medication List      START taking these medications    Details   !! albuterol (PROVENTIL VENTOLIN) 2.5 mg /3 mL (0.083 %) nebulizer solution 3 mL by Nebulization route every two (2) hours as needed for Wheezing. Qty: 24 Each, Refills: 0      albuterol-ipratropium (DUO-NEB) 2.5 mg-0.5 mg/3 ml nebu 3 mL by Nebulization route every four (4) hours as needed. Qty: 30 Nebule, Refills: 0      chlorpheniramine-HYDROcodone (TUSSIONEX) 10-8 mg/5 mL suspension Take 5 mL by mouth every twelve (12) hours as needed for Cough. Max Daily Amount: 10 mL.   Qty: 60 mL, Refills: 0    Associated Diagnoses: Acute exacerbation of chronic obstructive pulmonary disease (COPD) (HCC)      predniSONE (DELTASONE) 20 mg tablet 2 tabs daily x 3 days then 1 tab daily x 3 days then 1/2 tab daily x 3 days  Qty: 10 Tab, Refills: 0      azithromycin (ZITHROMAX) 250 mg tablet Take 1 Tab by mouth daily. Qty: 6 Tab, Refills: 0      benzonatate (TESSALON PERLES) 100 mg capsule Take 1 Cap by mouth three (3) times daily as needed for Cough for up to 7 days. Qty: 60 Cap, Refills: 0       !! - Potential duplicate medications found. Please discuss with provider. CONTINUE these medications which have NOT CHANGED    Details   fluticasone furoate (ARNUITY ELLIPTA) 100 mcg/actuation dsdv inhaler Take 1 Puff by inhalation daily. Qty: 1 Inhaler, Refills: 0      acetaminophen (TYLENOL ARTHRITIS PAIN) 650 mg TbER Take 1 Tab by mouth every eight (8) hours. Qty: 15 Tab, Refills: 0      furosemide (LASIX) 20 mg tablet Take 20 mg by mouth daily. albuterol (PROVENTIL HFA, VENTOLIN HFA, PROAIR HFA) 90 mcg/actuation inhaler Take 2 Puffs by inhalation every four (4) hours as needed for Wheezing or Shortness of Breath. Qty: 1 Inhaler, Refills: 1      ipratropium (ATROVENT) 0.03 % nasal spray 2 Sprays every twelve (12) hours. tamsulosin (FLOMAX) 0.4 mg capsule Take 0.4 mg by mouth daily. !! albuterol (PROVENTIL VENTOLIN) 2.5 mg /3 mL (0.083 %) nebulizer solution 3 mL by Nebulization route every four (4) hours as needed for Wheezing. Qty: 24 Each, Refills: 0      chlorthalidone (HYGROTEN) 25 mg tablet Take  by mouth daily. Nebulizer & Compressor machine Dispense: 1 nebulizer machine w all tubing necessary  Qty: 1 each, Refills: 0       !! - Potential duplicate medications found. Please discuss with provider.           Disposition: home   Follow up:  pcp  Restrictions: none    Diagnostic Imaging/Labs pending at discharge: none      Elizabeth Ring, [address] [facility]  Internal Medicine/Geriatrics    Time Spent 35 minutes with >50% coordination of care          CC: Dagoberto Troncoso MD

## 2019-04-21 LAB
ATRIAL RATE: 102 BPM
CALCULATED P AXIS, ECG09: 69 DEGREES
CALCULATED R AXIS, ECG10: 55 DEGREES
CALCULATED T AXIS, ECG11: 78 DEGREES
DIAGNOSIS, 93000: NORMAL
P-R INTERVAL, ECG05: 144 MS
Q-T INTERVAL, ECG07: 330 MS
QRS DURATION, ECG06: 76 MS
QTC CALCULATION (BEZET), ECG08: 430 MS
VENTRICULAR RATE, ECG03: 102 BPM

## 2019-07-06 ENCOUNTER — APPOINTMENT (OUTPATIENT)
Dept: GENERAL RADIOLOGY | Age: 74
DRG: 208 | End: 2019-07-06
Attending: EMERGENCY MEDICINE
Payer: MEDICARE

## 2019-07-06 ENCOUNTER — HOSPITAL ENCOUNTER (INPATIENT)
Age: 74
LOS: 6 days | Discharge: NURSING, REHAB OR LONG-TERM CARE | DRG: 208 | End: 2019-07-12
Attending: EMERGENCY MEDICINE | Admitting: FAMILY MEDICINE
Payer: MEDICARE

## 2019-07-06 DIAGNOSIS — R06.03 RESPIRATORY DISTRESS: Primary | ICD-10-CM

## 2019-07-06 DIAGNOSIS — J96.01 ACUTE RESPIRATORY FAILURE WITH HYPOXIA (HCC): ICD-10-CM

## 2019-07-06 DIAGNOSIS — Z71.89 ADVANCED CARE PLANNING/COUNSELING DISCUSSION: ICD-10-CM

## 2019-07-06 DIAGNOSIS — J44.1 COPD EXACERBATION (HCC): ICD-10-CM

## 2019-07-06 DIAGNOSIS — J18.9 PNEUMONIA OF RIGHT LOWER LOBE DUE TO INFECTIOUS ORGANISM: ICD-10-CM

## 2019-07-06 DIAGNOSIS — J44.9 COPD (CHRONIC OBSTRUCTIVE PULMONARY DISEASE) WITH CHRONIC BRONCHITIS (HCC): ICD-10-CM

## 2019-07-06 DIAGNOSIS — R53.81 DEBILITY: ICD-10-CM

## 2019-07-06 DIAGNOSIS — J44.9 CHRONIC OBSTRUCTIVE PULMONARY DISEASE, UNSPECIFIED COPD TYPE (HCC): ICD-10-CM

## 2019-07-06 LAB
ALBUMIN SERPL-MCNC: 3.2 G/DL (ref 3.4–5)
ALBUMIN/GLOB SERPL: 1.1 {RATIO} (ref 0.8–1.7)
ALP SERPL-CCNC: 87 U/L (ref 45–117)
ALT SERPL-CCNC: 11 U/L (ref 16–61)
ANION GAP SERPL CALC-SCNC: 8 MMOL/L (ref 3–18)
ARTERIAL PATENCY WRIST A: ABNORMAL
AST SERPL-CCNC: 12 U/L (ref 15–37)
ATRIAL RATE: 108 BPM
BASE DEFICIT BLD-SCNC: 4 MMOL/L
BASOPHILS # BLD: 0.1 K/UL (ref 0–0.1)
BASOPHILS NFR BLD: 1 % (ref 0–2)
BDY SITE: ABNORMAL
BILIRUB SERPL-MCNC: 0.4 MG/DL (ref 0.2–1)
BNP SERPL-MCNC: 52 PG/ML (ref 0–1800)
BODY TEMPERATURE: 98.5
BUN SERPL-MCNC: 21 MG/DL (ref 7–18)
BUN/CREAT SERPL: 18 (ref 12–20)
CALCIUM SERPL-MCNC: 8.6 MG/DL (ref 8.5–10.1)
CALCULATED P AXIS, ECG09: 68 DEGREES
CALCULATED R AXIS, ECG10: 47 DEGREES
CALCULATED T AXIS, ECG11: 65 DEGREES
CHLORIDE SERPL-SCNC: 99 MMOL/L (ref 100–108)
CK MB CFR SERPL CALC: 4.7 % (ref 0–4)
CK MB SERPL-MCNC: 4 NG/ML (ref 5–25)
CK SERPL-CCNC: 85 U/L (ref 39–308)
CO2 SERPL-SCNC: 25 MMOL/L (ref 21–32)
CREAT SERPL-MCNC: 1.17 MG/DL (ref 0.6–1.3)
DIAGNOSIS, 93000: NORMAL
DIFFERENTIAL METHOD BLD: ABNORMAL
EOSINOPHIL # BLD: 1.1 K/UL (ref 0–0.4)
EOSINOPHIL NFR BLD: 11 % (ref 0–5)
ERYTHROCYTE [DISTWIDTH] IN BLOOD BY AUTOMATED COUNT: 14 % (ref 11.6–14.5)
GAS FLOW.O2 O2 DELIVERY SYS: ABNORMAL L/MIN
GAS FLOW.O2 SETTING OXYMISER: 4 L/M
GLOBULIN SER CALC-MCNC: 2.9 G/DL (ref 2–4)
GLUCOSE BLD STRIP.AUTO-MCNC: 138 MG/DL (ref 70–110)
GLUCOSE SERPL-MCNC: 142 MG/DL (ref 74–99)
HCO3 BLD-SCNC: 22.2 MMOL/L (ref 22–26)
HCT VFR BLD AUTO: 34.9 % (ref 36–48)
HGB BLD-MCNC: 11.9 G/DL (ref 13–16)
LACTATE BLD-SCNC: 0.81 MMOL/L (ref 0.4–2)
LYMPHOCYTES # BLD: 1.4 K/UL (ref 0.9–3.6)
LYMPHOCYTES NFR BLD: 14 % (ref 21–52)
MCH RBC QN AUTO: 28.3 PG (ref 24–34)
MCHC RBC AUTO-ENTMCNC: 34.1 G/DL (ref 31–37)
MCV RBC AUTO: 83.1 FL (ref 74–97)
MONOCYTES # BLD: 0.8 K/UL (ref 0.05–1.2)
MONOCYTES NFR BLD: 8 % (ref 3–10)
NEUTS SEG # BLD: 6.7 K/UL (ref 1.8–8)
NEUTS SEG NFR BLD: 66 % (ref 40–73)
O2/TOTAL GAS SETTING VFR VENT: 0.36 %
P-R INTERVAL, ECG05: 162 MS
PCO2 BLD: 40.7 MMHG (ref 35–45)
PH BLD: 7.34 [PH] (ref 7.35–7.45)
PLATELET # BLD AUTO: 287 K/UL (ref 135–420)
PMV BLD AUTO: 8.7 FL (ref 9.2–11.8)
PO2 BLD: 71 MMHG (ref 80–100)
POTASSIUM SERPL-SCNC: 4 MMOL/L (ref 3.5–5.5)
PROT SERPL-MCNC: 6.1 G/DL (ref 6.4–8.2)
Q-T INTERVAL, ECG07: 330 MS
QRS DURATION, ECG06: 86 MS
QTC CALCULATION (BEZET), ECG08: 442 MS
RBC # BLD AUTO: 4.2 M/UL (ref 4.7–5.5)
SAO2 % BLD: 93 % (ref 92–97)
SERVICE CMNT-IMP: ABNORMAL
SODIUM SERPL-SCNC: 132 MMOL/L (ref 136–145)
SPECIMEN TYPE: ABNORMAL
TOTAL RESP. RATE, ITRR: 24
TROPONIN I SERPL-MCNC: <0.02 NG/ML (ref 0–0.04)
VENTRICULAR RATE, ECG03: 108 BPM
WBC # BLD AUTO: 10.1 K/UL (ref 4.6–13.2)

## 2019-07-06 PROCEDURE — 87040 BLOOD CULTURE FOR BACTERIA: CPT

## 2019-07-06 PROCEDURE — 85025 COMPLETE CBC W/AUTO DIFF WBC: CPT

## 2019-07-06 PROCEDURE — 83605 ASSAY OF LACTIC ACID: CPT

## 2019-07-06 PROCEDURE — 82962 GLUCOSE BLOOD TEST: CPT

## 2019-07-06 PROCEDURE — 82803 BLOOD GASES ANY COMBINATION: CPT

## 2019-07-06 PROCEDURE — 77010033678 HC OXYGEN DAILY

## 2019-07-06 PROCEDURE — C9113 INJ PANTOPRAZOLE SODIUM, VIA: HCPCS | Performed by: FAMILY MEDICINE

## 2019-07-06 PROCEDURE — 74011000250 HC RX REV CODE- 250: Performed by: EMERGENCY MEDICINE

## 2019-07-06 PROCEDURE — 82550 ASSAY OF CK (CPK): CPT

## 2019-07-06 PROCEDURE — 94762 N-INVAS EAR/PLS OXIMTRY CONT: CPT

## 2019-07-06 PROCEDURE — 83880 ASSAY OF NATRIURETIC PEPTIDE: CPT

## 2019-07-06 PROCEDURE — 74011250636 HC RX REV CODE- 250/636: Performed by: FAMILY MEDICINE

## 2019-07-06 PROCEDURE — 93005 ELECTROCARDIOGRAM TRACING: CPT

## 2019-07-06 PROCEDURE — 77030027138 HC INCENT SPIROMETER -A

## 2019-07-06 PROCEDURE — 74011250637 HC RX REV CODE- 250/637: Performed by: FAMILY MEDICINE

## 2019-07-06 PROCEDURE — 77030013140 HC MSK NEB VYRM -A

## 2019-07-06 PROCEDURE — 36600 WITHDRAWAL OF ARTERIAL BLOOD: CPT

## 2019-07-06 PROCEDURE — 94640 AIRWAY INHALATION TREATMENT: CPT

## 2019-07-06 PROCEDURE — 74011000258 HC RX REV CODE- 258: Performed by: FAMILY MEDICINE

## 2019-07-06 PROCEDURE — 94760 N-INVAS EAR/PLS OXIMETRY 1: CPT

## 2019-07-06 PROCEDURE — 71045 X-RAY EXAM CHEST 1 VIEW: CPT

## 2019-07-06 PROCEDURE — 80053 COMPREHEN METABOLIC PANEL: CPT

## 2019-07-06 PROCEDURE — 65270000029 HC RM PRIVATE

## 2019-07-06 PROCEDURE — 74011000250 HC RX REV CODE- 250: Performed by: INTERNAL MEDICINE

## 2019-07-06 PROCEDURE — 99285 EMERGENCY DEPT VISIT HI MDM: CPT

## 2019-07-06 RX ORDER — IPRATROPIUM BROMIDE AND ALBUTEROL SULFATE 2.5; .5 MG/3ML; MG/3ML
3 SOLUTION RESPIRATORY (INHALATION) ONCE
Status: COMPLETED | OUTPATIENT
Start: 2019-07-06 | End: 2019-07-06

## 2019-07-06 RX ORDER — LEVOFLOXACIN 5 MG/ML
750 INJECTION, SOLUTION INTRAVENOUS EVERY 24 HOURS
Status: COMPLETED | OUTPATIENT
Start: 2019-07-06 | End: 2019-07-12

## 2019-07-06 RX ORDER — ACETAMINOPHEN 325 MG/1
650 TABLET ORAL EVERY 8 HOURS
Status: DISCONTINUED | OUTPATIENT
Start: 2019-07-06 | End: 2019-07-12 | Stop reason: HOSPADM

## 2019-07-06 RX ORDER — TAMSULOSIN HYDROCHLORIDE 0.4 MG/1
0.4 CAPSULE ORAL DAILY
Status: DISCONTINUED | OUTPATIENT
Start: 2019-07-06 | End: 2019-07-06

## 2019-07-06 RX ORDER — HEPARIN SODIUM 5000 [USP'U]/ML
5000 INJECTION, SOLUTION INTRAVENOUS; SUBCUTANEOUS EVERY 8 HOURS
Status: DISCONTINUED | OUTPATIENT
Start: 2019-07-06 | End: 2019-07-10

## 2019-07-06 RX ORDER — LEVOFLOXACIN 5 MG/ML
750 INJECTION, SOLUTION INTRAVENOUS
Status: DISCONTINUED | OUTPATIENT
Start: 2019-07-06 | End: 2019-07-06 | Stop reason: SDUPTHER

## 2019-07-06 RX ORDER — ALBUTEROL SULFATE 0.83 MG/ML
2.5 SOLUTION RESPIRATORY (INHALATION)
Status: DISCONTINUED | OUTPATIENT
Start: 2019-07-06 | End: 2019-07-11

## 2019-07-06 RX ORDER — SODIUM CHLORIDE 9 MG/ML
100 INJECTION, SOLUTION INTRAVENOUS CONTINUOUS
Status: DISCONTINUED | OUTPATIENT
Start: 2019-07-06 | End: 2019-07-08

## 2019-07-06 RX ORDER — DOXAZOSIN 1 MG/1
1 TABLET ORAL DAILY
Status: DISCONTINUED | OUTPATIENT
Start: 2019-07-06 | End: 2019-07-12 | Stop reason: HOSPADM

## 2019-07-06 RX ADMIN — PIPERACILLIN SODIUM,TAZOBACTAM SODIUM 3.38 G: 3; .375 INJECTION, POWDER, FOR SOLUTION INTRAVENOUS at 07:44

## 2019-07-06 RX ADMIN — ACETAMINOPHEN 650 MG: 325 TABLET ORAL at 16:24

## 2019-07-06 RX ADMIN — LEVOFLOXACIN 750 MG: 5 INJECTION, SOLUTION INTRAVENOUS at 08:32

## 2019-07-06 RX ADMIN — PIPERACILLIN SODIUM,TAZOBACTAM SODIUM 3.38 G: 3; .375 INJECTION, POWDER, FOR SOLUTION INTRAVENOUS at 21:00

## 2019-07-06 RX ADMIN — SODIUM CHLORIDE 100 ML/HR: 900 INJECTION, SOLUTION INTRAVENOUS at 09:53

## 2019-07-06 RX ADMIN — SODIUM CHLORIDE 40 MG: 9 INJECTION INTRAMUSCULAR; INTRAVENOUS; SUBCUTANEOUS at 08:33

## 2019-07-06 RX ADMIN — ALBUTEROL SULFATE 2.5 MG: 2.5 SOLUTION RESPIRATORY (INHALATION) at 15:49

## 2019-07-06 RX ADMIN — ALBUTEROL SULFATE 2.5 MG: 2.5 SOLUTION RESPIRATORY (INHALATION) at 19:36

## 2019-07-06 RX ADMIN — HEPARIN SODIUM 5000 UNITS: 5000 INJECTION INTRAVENOUS; SUBCUTANEOUS at 16:24

## 2019-07-06 RX ADMIN — DOXAZOSIN 1 MG: 1 TABLET ORAL at 09:53

## 2019-07-06 RX ADMIN — METHYLPREDNISOLONE SODIUM SUCCINATE 125 MG: 125 INJECTION, POWDER, FOR SOLUTION INTRAMUSCULAR; INTRAVENOUS at 21:00

## 2019-07-06 RX ADMIN — ALBUTEROL SULFATE 2.5 MG: 2.5 SOLUTION RESPIRATORY (INHALATION) at 23:10

## 2019-07-06 RX ADMIN — IPRATROPIUM BROMIDE AND ALBUTEROL SULFATE 3 ML: .5; 3 SOLUTION RESPIRATORY (INHALATION) at 06:05

## 2019-07-06 RX ADMIN — UMECLIDINIUM 1 PUFF: 62.5 AEROSOL, POWDER ORAL at 09:53

## 2019-07-06 RX ADMIN — SODIUM CHLORIDE 100 ML/HR: 900 INJECTION, SOLUTION INTRAVENOUS at 21:00

## 2019-07-06 RX ADMIN — PIPERACILLIN SODIUM,TAZOBACTAM SODIUM 3.38 G: 3; .375 INJECTION, POWDER, FOR SOLUTION INTRAVENOUS at 14:23

## 2019-07-06 RX ADMIN — ACETAMINOPHEN 650 MG: 325 TABLET ORAL at 08:34

## 2019-07-06 RX ADMIN — METHYLPREDNISOLONE SODIUM SUCCINATE 125 MG: 125 INJECTION, POWDER, FOR SOLUTION INTRAMUSCULAR; INTRAVENOUS at 14:23

## 2019-07-06 RX ADMIN — HEPARIN SODIUM 5000 UNITS: 5000 INJECTION INTRAVENOUS; SUBCUTANEOUS at 08:33

## 2019-07-06 RX ADMIN — FLUTICASONE FUROATE 1 PUFF: 100 POWDER RESPIRATORY (INHALATION) at 09:53

## 2019-07-06 NOTE — ACP (ADVANCE CARE PLANNING)
[facility] - Not Interested   addressed Advance Medical Directives with the patient. Patient is not interested at this time.  completed visit with patient and offered Pastoral care. Chaplains will continue to follow and will provide pastoral care as needed or requested. Rev.  [address] [facility]  [phone]

## 2019-07-06 NOTE — PROGRESS NOTES
Admitted THis am by Dr. Torres Blood  He is feeling better.  I think a breathing treatment would help

## 2019-07-06 NOTE — PROGRESS NOTES
PRN NEB WAS GIVEN @ 1552. PATIENT EXHIBITING INCREASED WOB/USING ACCESSORY MUSCLES.  SPO2 95-96% ON RA.  BS DIMINISHED BILATERALLY PRE AND POST TX. PATIENT STATED A LITTLE RELIEF. WILL CONTINUE TO MONITOR.

## 2019-07-06 NOTE — H&P
History & Physical    Patient: Ashish Vincent MRN: [id number]  CSN: [id number]    YOB: 1945  Age: 74 y.o. Sex: male      DOA: 7/6/2019  Primary Care Provider:  Gwendolyn Lepe MD      Assessment/Plan     Patient Active Problem List   Diagnosis Code    COPD (chronic obstructive pulmonary disease) ([address] J44.9    Hypertension I10    Tobacco abuse Z72.0    COPD (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 ([facility]) J44.9    Chronic renal disease, stage 3, moderately decreased glomerular filtration rate between 30-59 mL/min/1.73 square meter ([facility]) N18.3    COPD exacerbation ([address] J44.1    Asbestosis ([address] J61    Acute exacerbation of chronic obstructive pulmonary disease (COPD) ([address] J44.1    Pneumonia J18.9    COPD with acute exacerbation ([address] J44.1       75 y/o male with COPD (not on chronic oxygen) who is admitted for acute COPD exacerbation in the setting on RLL PNA. -continuous pulse ox  -NC O2 for now, maintain sats 90-94%  -zosyn and levaquin for CAP  -solumedrol  -consider ABG for clinical worsening    Full code    Estimated length of stay : 3 nights    Prophy-SCDs, heparin, protonix    Ella Gibbons MD  Nocturnist    ---------------------------------------------------------------------------------------------------------------------------------    CC: dyspnea       HPI:     Ashish Vincent is a 74 y.o. male who has had dyspnea for a few weeks but worsened over the past 2 days and has felt SOB. No fever, no chest pain, o/w feeling well. Has never required intubation.     Past Medical History:   Diagnosis Date    Cancer [facility])     prostate    COPD (chronic obstructive pulmonary disease) ([facility] [address]     Hypertension     Pneumonia     Radiation effect        Past Surgical History:   Procedure Laterality Date    HX HERNIA REPAIR         Family History   Problem Relation Age of Onset    Heart Disease Mother        Social History     Socioeconomic History    Marital status:      Spouse name: Not on file    Number of children: Not on file    Years of education: Not on file    Highest education level: Not on file   Tobacco Use    Smoking status: Former Smoker     Types: Cigarettes    Smokeless tobacco: Never Used   Substance and Sexual Activity    Alcohol use: No       Prior to Admission medications    Medication Sig Start Date End Date Taking? Authorizing Provider   albuterol (PROVENTIL VENTOLIN) 2.5 mg /3 mL (0.083 %) nebulizer solution 3 mL by Nebulization route every two (2) hours as needed for Wheezing. 2/9/19   Roshni Torres DO   albuterol-ipratropium (DUO-NEB) 2.5 mg-0.5 mg/3 ml nebu 3 mL by Nebulization route every four (4) hours as needed. 2/9/19   Roshni Torres, DO   chlorpheniramine-HYDROcodone (TUSSIONEX) 10-8 mg/5 mL suspension Take 5 mL by mouth every twelve (12) hours as needed for Cough. Max Daily Amount: 10 mL. 2/9/19   Roshni Torres DO   predniSONE (DELTASONE) 20 mg tablet 2 tabs daily x 3 days then 1 tab daily x 3 days then 1/2 tab daily x 3 days 2/9/19   Roshni Torres, DO   fluticasone furoate (ARNUITY ELLIPTA) 100 mcg/actuation dsdv inhaler Take 1 Puff by inhalation daily. 10/24/18   Tanisha Ridley PA-C   acetaminophen (TYLENOL ARTHRITIS PAIN) 650 mg TbER Take 1 Tab by mouth every eight (8) hours. 9/8/18   Vamshi TREJO PA-C   furosemide (LASIX) 20 mg tablet Take 20 mg by mouth daily. Blayne Bran MD   albuterol (PROVENTIL HFA, VENTOLIN HFA, PROAIR HFA) 90 mcg/actuation inhaler Take 2 Puffs by inhalation every four (4) hours as needed for Wheezing or Shortness of Breath. 4/23/18   Tanisha Ridley PA-C   ipratropium (ATROVENT) 0.03 % nasal spray 2 Sprays every twelve (12) hours. Blayne Bran MD   tamsulosin (FLOMAX) 0.4 mg capsule Take 0.4 mg by mouth daily. Blayne Bran MD   albuterol (PROVENTIL VENTOLIN) 2.5 mg /3 mL (0.083 %) nebulizer solution 3 mL by Nebulization route every four (4) hours as needed for Wheezing. 12/23/15   TRINA Valerio   chlorthalidone (HYGROTEN) 25 mg tablet Take  by mouth daily. Blayne Bran MD   Nebulizer & Compressor machine Dispense: 1 nebulizer machine w all tubing necessary 10/6/14   Anthony Clemens DO   REPORTS NOT TAKING BLOOD PRESSURE MEDICATION    Allergies   Allergen Reactions    Aspirin Other (comments)     \"messes my stomach up\"       Review of Systems  Gen: No fever, chills, malaise, weight loss/gain. Heent: No headache, rhinorrhea, epistaxis, ear pain, hearing loss, sinus pain, neck pain/stiffness, sore throat. Heart: No chest pain, palpitations, KUMAR, pnd, or orthopnea. Resp: No cough, hemoptysis, +wheezing and shortness of breath. GI: No nausea, vomiting, diarrhea, constipation, melena or hematochezia. : Baseline urinary obstruction due to history of prostate CA, no dysuria or hematuria. Derm: No rash, new skin lesion or pruritis. Musc/skeletal: no bone or joint complaints. Vasc: No edema, cyanosis or claudication. Endo: No heat/cold intolerance, no polyuria,polydipsia or polyphagia. Neuro: No unilateral weakness, numbness, tingling. No seizures. Heme: No easy bruising or bleeding. Physical Exam:     Physical Exam:  Visit Vitals  /69 (BP 1 Location: Left arm, BP Patient Position: At rest)   Pulse (!) 111   Temp 98.5 °F (36.9 °C)   Resp 28   Ht 5' 8\" (1.727 m)   Wt 83.9 kg (185 lb)   SpO2 93%   BMI 28.13 kg/m²    O2 Flow Rate (L/min): 4 l/min O2 Device: Nasal cannula    Temp (24hrs), Av.5 °F (36.9 °C), Min:98.5 °F (36.9 °C), Max:98.5 °F (36.9 °C)    No intake/output data recorded. No intake/output data recorded. General:  Awake, cooperative, in mild respiratory distress with neb running, using accessory muscles to breathe. Head:  Normocephalic, without obvious abnormality, atraumatic. Eyes:  Conjunctivae/corneas clear, sclera anicteric, PERRL, EOMs intact. Nose: Nares normal. No drainage or sinus tenderness.    Throat: Lips, mucosa, and tongue normal.    Neck: Supple, symmetrical, trachea midline, no adenopathy. Lungs:   Diffuse        Heart:  Regular rate and rhythm, S1, S2 normal, no murmur, click, rub or gallop. Abdomen: Soft, non-tender. Bowel sounds normal. No masses,  No organomegaly. Extremities: Extremities normal, atraumatic, no cyanosis or edema. Capillary refill normal.   Pulses: 2+ and symmetric all extremities. Skin: Skin color pink, turgor normal. No rashes or lesions   Neurologic: No focal motor or sensory deficit. Labs Reviewed: All lab results for the last 24 hours reviewed. Recent Results (from the past 24 hour(s))   CBC WITH AUTOMATED DIFF    Collection Time: 07/06/19  5:46 AM   Result Value Ref Range    WBC 10.1 4.6 - 13.2 K/uL    RBC 4.20 (L) 4.70 - 5.50 M/uL    HGB 11.9 (L) 13.0 - 16.0 g/dL    HCT 34.9 (L) 36.0 - 48.0 %    MCV 83.1 74.0 - 97.0 FL    MCH 28.3 24.0 - 34.0 PG    MCHC 34.1 31.0 - 37.0 g/dL    RDW 14.0 11.6 - 14.5 %    PLATELET 602 903 - 390 K/uL    MPV 8.7 (L) 9.2 - 11.8 FL    NEUTROPHILS 66 40 - 73 %    LYMPHOCYTES 14 (L) 21 - 52 %    MONOCYTES 8 3 - 10 %    EOSINOPHILS 11 (H) 0 - 5 %    BASOPHILS 1 0 - 2 %    ABS. NEUTROPHILS 6.7 1.8 - 8.0 K/UL    ABS. LYMPHOCYTES 1.4 0.9 - 3.6 K/UL    ABS. MONOCYTES 0.8 0.05 - 1.2 K/UL    ABS. EOSINOPHILS 1.1 (H) 0.0 - 0.4 K/UL    ABS.  BASOPHILS 0.1 0.0 - 0.1 K/UL    DF AUTOMATED     CARDIAC PANEL,(CK, CKMB & TROPONIN)    Collection Time: 07/06/19  5:46 AM   Result Value Ref Range    CK 85 39 - 308 U/L    CK - MB 4.0 (H) <3.6 ng/ml    CK-MB Index 4.7 (H) 0.0 - 4.0 %    Troponin-I, QT <0.02 0.0 - 5.497 NG/ML   METABOLIC PANEL, COMPREHENSIVE    Collection Time: 07/06/19  5:46 AM   Result Value Ref Range    Sodium 132 (L) 136 - 145 mmol/L    Potassium 4.0 3.5 - 5.5 mmol/L    Chloride 99 (L) 100 - 108 mmol/L    CO2 25 21 - 32 mmol/L    Anion gap 8 3.0 - 18 mmol/L    Glucose 142 (H) 74 - 99 mg/dL    BUN 21 (H) 7.0 - 18 MG/DL    Creatinine 1.17 0.6 - 1.3 MG/DL    BUN/Creatinine ratio 18 12 - 20      GFR est AA >60 >60 ml/min/1.73m2    GFR est non-AA >60 >60 ml/min/1.73m2    Calcium 8.6 8.5 - 10.1 MG/DL    Bilirubin, total 0.4 0.2 - 1.0 MG/DL    ALT (SGPT) 11 (L) 16 - 61 U/L    AST (SGOT) 12 (L) 15 - 37 U/L    Alk. phosphatase 87 45 - 117 U/L    Protein, total 6.1 (L) 6.4 - 8.2 g/dL    Albumin 3.2 (L) 3.4 - 5.0 g/dL    Globulin 2.9 2.0 - 4.0 g/dL    A-G Ratio 1.1 0.8 - 1.7     NT-PRO BNP    Collection Time: 07/06/19  5:46 AM   Result Value Ref Range    NT pro-BNP 52 0 - 1,800 PG/ML   POC LACTIC ACID    Collection Time: 07/06/19  5:47 AM   Result Value Ref Range    Lactic Acid (POC) 0.81 0.40 - 2.00 mmol/L   EKG, 12 LEAD, INITIAL    Collection Time: 07/06/19  5:47 AM   Result Value Ref Range    Ventricular Rate 108 BPM    Atrial Rate 108 BPM    P-R Interval 162 ms    QRS Duration 86 ms    Q-T Interval 330 ms    QTC Calculation (Bezet) 442 ms    Calculated P Axis 68 degrees    Calculated R Axis 47 degrees    Calculated T Axis 65 degrees    Diagnosis       Sinus tachycardia  Otherwise normal ECG  When compared with ECG of 08-FEB-2019 12:15,  aberrant conduction is no longer present  T wave inversion no longer evident in Anterior leads     POC G3    Collection Time: 07/06/19  5:59 AM   Result Value Ref Range    Device: NASAL CANNULA      Flow rate (POC) 4 L/M    FIO2 (POC) 0.36 %    pH (POC) 7.345 (L) 7.35 - 7.45      pCO2 (POC) 40.7 35.0 - 45.0 MMHG    pO2 (POC) 71 (L) 80 - 100 MMHG    HCO3 (POC) 22.2 22 - 26 MMOL/L    sO2 (POC) 93 92 - 97 %    Base deficit (POC) 4 mmol/L    Allens test (POC) N/A      Total resp. rate 24      Site RIGHT RADIAL      Patient temp.  98.5      Specimen type (POC) ARTERIAL      Performed by Alfredo Carmona        CXR RLL PNA but pending final read

## 2019-07-06 NOTE — ROUTINE PROCESS
TRANSFER - IN REPORT:    Verbal report received from [facility], RN (name) on Delmi Rosales  being received from ED (unit) for routine progression of care      Report consisted of patients Situation, Background, Assessment and   Recommendations(SBAR). Information from the following report(s) SBAR, Kardex, ED Summary, Intake/Output, MAR, Recent Results and Cardiac Rhythm ST, NSR was reviewed with the receiving nurse. Opportunity for questions and clarification was provided.

## 2019-07-06 NOTE — ED NOTES
TRANSFER - OUT REPORT:    Verbal report given to MAURICIO Olsen(name) on Jefferson Rosales  being transferred to  for routine progression of care       Report consisted of patients Situation, Background, Assessment and   Recommendations(SBAR). Information from the following report(s) SBAR, Kardex, ED Summary, MAR, Recent Results and Cardiac Rhythm SR was reviewed with the receiving nurse. Lines:   Peripheral IV 07/06/19 Left Hand (Active)   Site Assessment Clean, dry, & intact 7/6/2019  5:35 AM   Phlebitis Assessment 0 7/6/2019  5:35 AM   Infiltration Assessment 0 7/6/2019  5:35 AM   Dressing Status Clean, dry, & intact 7/6/2019  5:35 AM   Dressing Type Transparent 7/6/2019  5:35 AM   Alcohol Cap Used Yes 7/6/2019  5:35 AM        Opportunity for questions and clarification was provided.       Patient transported with:   O2 @ 2 liters  Tech

## 2019-07-06 NOTE — PROGRESS NOTES
Pt comfortable upon arrival to unit. C/o mild dyspnea with exertion and without SpO2 desaturation. Respirations even and more relaxed. Was able to successfully wean to RA and is compliant and engaged with POC, including IS.

## 2019-07-06 NOTE — ED PROVIDER NOTES
EMERGENCY DEPARTMENT HISTORY AND PHYSICAL EXAM    Date: 7/6/2019  Patient Name: Jose Cruz Ling    History of Presenting Illness     Chief Complaint   Patient presents with    Respiratory Distress         HPI:   6:19 AM  Jose Cruz Ling is a 74 y.o. male with PMHX of hypertension, COPD, pneumonia, prostate cancer 2 years ago who presents to the emergency department via EMS c/O shortness of breath. Patient states he has been progressively worsening with  shortness of breath since about a week ago. Shortness of breath associated with a productive cough of brownish sputum. No chest pain, no nausea no vomiting. He became excessively short of breath this morning called EMS who stated patient was in respiratory distress at scene and required 4 L of oxygen nasal cannula and was given 2 nebs of DuoNeb and 125 mg of Solu-Medrol prior to ED arrival.  Patient states he is not on oxygen at home but has a nebulizer machine which he has been using 4 times per day. He stopped smoking 2 months ago. Brenda Hong PCP: Sonya Mejia MD    Current Outpatient Medications   Medication Sig Dispense Refill    albuterol (PROVENTIL VENTOLIN) 2.5 mg /3 mL (0.083 %) nebulizer solution 3 mL by Nebulization route every two (2) hours as needed for Wheezing. 24 Each 0    albuterol-ipratropium (DUO-NEB) 2.5 mg-0.5 mg/3 ml nebu 3 mL by Nebulization route every four (4) hours as needed. 30 Nebule 0    chlorpheniramine-HYDROcodone (TUSSIONEX) 10-8 mg/5 mL suspension Take 5 mL by mouth every twelve (12) hours as needed for Cough. Max Daily Amount: 10 mL. 60 mL 0    predniSONE (DELTASONE) 20 mg tablet 2 tabs daily x 3 days then 1 tab daily x 3 days then 1/2 tab daily x 3 days 10 Tab 0    azithromycin (ZITHROMAX) 250 mg tablet Take 1 Tab by mouth daily. 6 Tab 0    fluticasone furoate (ARNUITY ELLIPTA) 100 mcg/actuation dsdv inhaler Take 1 Puff by inhalation daily.  1 Inhaler 0    acetaminophen (TYLENOL ARTHRITIS PAIN) 650 mg TbER Take 1 Tab by mouth every eight (8) hours. 15 Tab 0    furosemide (LASIX) 20 mg tablet Take 20 mg by mouth daily.  albuterol (PROVENTIL HFA, VENTOLIN HFA, PROAIR HFA) 90 mcg/actuation inhaler Take 2 Puffs by inhalation every four (4) hours as needed for Wheezing or Shortness of Breath. 1 Inhaler 1    ipratropium (ATROVENT) 0.03 % nasal spray 2 Sprays every twelve (12) hours.  tamsulosin (FLOMAX) 0.4 mg capsule Take 0.4 mg by mouth daily.  albuterol (PROVENTIL VENTOLIN) 2.5 mg /3 mL (0.083 %) nebulizer solution 3 mL by Nebulization route every four (4) hours as needed for Wheezing. 24 Each 0    chlorthalidone (HYGROTEN) 25 mg tablet Take  by mouth daily.  Nebulizer & Compressor machine Dispense: 1 nebulizer machine w all tubing necessary 1 each 0       Past History     Past Medical History:  Past Medical History:   Diagnosis Date    Cancer ([facility] [address]     prostate    COPD (chronic obstructive pulmonary disease) ([facility] [address]     Hypertension     Pneumonia     Radiation effect        Past Surgical History:  Past Surgical History:   Procedure Laterality Date    HX HERNIA REPAIR         Family History:  Family History   Problem Relation Age of Onset    Heart Disease Mother        Social History:  Social History     Tobacco Use    Smoking status: Former Smoker     Types: Cigarettes    Smokeless tobacco: Never Used   Substance Use Topics    Alcohol use: No    Drug use: Not on file       Allergies: Allergies   Allergen Reactions    Aspirin Other (comments)     \"messes my stomach up\"         Review of Systems   Review of Systems   Constitutional: Negative for chills and fever. HENT: Negative for congestion and sore throat. Respiratory: Positive for cough, shortness of breath and wheezing. Cardiovascular: Negative for chest pain and palpitations. Gastrointestinal: Negative for abdominal pain, nausea and vomiting. Genitourinary: Negative for dysuria, flank pain and frequency.    Musculoskeletal: Negative for arthralgias, joint swelling and myalgias. Skin: Negative for color change and wound. Neurological: Negative for dizziness, weakness, light-headedness and headaches. Hematological: Negative for adenopathy. Physical Exam     Vitals:    07/06/19 0532 07/06/19 0540 07/06/19 0605   BP: 157/69     Pulse: (!) 111     Resp: 28     Temp: 98.5 °F (36.9 °C)     SpO2: 90% 92% 93%   Weight: 83.9 kg (185 lb)     Height: 5' 8\" (1.727 m)       Physical Exam   Constitutional: He is oriented to person, place, and time. He appears well-developed and well-nourished. No distress. Elderly male in moderate respiratory distress at arrival.   HENT:   Head: Normocephalic and atraumatic. Head is without right periorbital erythema and without left periorbital erythema. Right Ear: External ear normal. No drainage or swelling. Tympanic membrane is not perforated, not erythematous and not bulging. Left Ear: External ear normal. No drainage or swelling. Tympanic membrane is not perforated, not erythematous and not bulging. Nose: Nose normal. No mucosal edema or rhinorrhea. Right sinus exhibits no maxillary sinus tenderness and no frontal sinus tenderness. Left sinus exhibits no maxillary sinus tenderness and no frontal sinus tenderness. Mouth/Throat: Uvula is midline, oropharynx is clear and moist and mucous membranes are normal. No oral lesions. No trismus in the jaw. No dental abscesses or uvula swelling. No oropharyngeal exudate, posterior oropharyngeal edema, posterior oropharyngeal erythema or tonsillar abscesses. Eyes: Conjunctivae are normal. Right eye exhibits no discharge. Left eye exhibits no discharge. No scleral icterus. Neck: Normal range of motion. Neck supple. Cardiovascular: Normal rate, regular rhythm, normal heart sounds and intact distal pulses. Exam reveals no gallop and no friction rub. No murmur heard. Pulmonary/Chest: No accessory muscle usage. No tachypnea. He is in respiratory distress. He has no decreased breath sounds. He has wheezes. He has no rhonchi. He has no rales. He is tachycardic, tachypneic, and he has diffuse expiratory wheezes with some subcostal retractions. Abdominal: Soft. Bowel sounds are normal. He exhibits no distension. There is no tenderness. Musculoskeletal: Normal range of motion. He exhibits no edema or tenderness. Lymphadenopathy:     He has no cervical adenopathy. Neurological: He is alert and oriented to person, place, and time. No cranial nerve deficit. Skin: Skin is warm and dry. He is not diaphoretic. Psychiatric: He has a normal mood and affect. Judgment normal.   Nursing note and vitals reviewed. Diagnostic Study Results     Labs -     Recent Results (from the past 12 hour(s))   CBC WITH AUTOMATED DIFF    Collection Time: 07/06/19  5:46 AM   Result Value Ref Range    WBC 10.1 4.6 - 13.2 K/uL    RBC 4.20 (L) 4.70 - 5.50 M/uL    HGB 11.9 (L) 13.0 - 16.0 g/dL    HCT 34.9 (L) 36.0 - 48.0 %    MCV 83.1 74.0 - 97.0 FL    MCH 28.3 24.0 - 34.0 PG    MCHC 34.1 31.0 - 37.0 g/dL    RDW 14.0 11.6 - 14.5 %    PLATELET 111 829 - 261 K/uL    MPV 8.7 (L) 9.2 - 11.8 FL    NEUTROPHILS 66 40 - 73 %    LYMPHOCYTES 14 (L) 21 - 52 %    MONOCYTES 8 3 - 10 %    EOSINOPHILS 11 (H) 0 - 5 %    BASOPHILS 1 0 - 2 %    ABS. NEUTROPHILS 6.7 1.8 - 8.0 K/UL    ABS. LYMPHOCYTES 1.4 0.9 - 3.6 K/UL    ABS. MONOCYTES 0.8 0.05 - 1.2 K/UL    ABS. EOSINOPHILS 1.1 (H) 0.0 - 0.4 K/UL    ABS.  BASOPHILS 0.1 0.0 - 0.1 K/UL    DF AUTOMATED     CARDIAC PANEL,(CK, CKMB & TROPONIN)    Collection Time: 07/06/19  5:46 AM   Result Value Ref Range    CK 85 39 - 308 U/L    CK - MB 4.0 (H) <3.6 ng/ml    CK-MB Index 4.7 (H) 0.0 - 4.0 %    Troponin-I, QT <0.02 0.0 - 2.076 NG/ML   METABOLIC PANEL, COMPREHENSIVE    Collection Time: 07/06/19  5:46 AM   Result Value Ref Range    Sodium 132 (L) 136 - 145 mmol/L    Potassium 4.0 3.5 - 5.5 mmol/L    Chloride 99 (L) 100 - 108 mmol/L    CO2 25 21 - 32 mmol/L Anion gap 8 3.0 - 18 mmol/L    Glucose 142 (H) 74 - 99 mg/dL    BUN 21 (H) 7.0 - 18 MG/DL    Creatinine 1.17 0.6 - 1.3 MG/DL    BUN/Creatinine ratio 18 12 - 20      GFR est AA >60 >60 ml/min/1.73m2    GFR est non-AA >60 >60 ml/min/1.73m2    Calcium 8.6 8.5 - 10.1 MG/DL    Bilirubin, total 0.4 0.2 - 1.0 MG/DL    ALT (SGPT) 11 (L) 16 - 61 U/L    AST (SGOT) 12 (L) 15 - 37 U/L    Alk. phosphatase 87 45 - 117 U/L    Protein, total 6.1 (L) 6.4 - 8.2 g/dL    Albumin 3.2 (L) 3.4 - 5.0 g/dL    Globulin 2.9 2.0 - 4.0 g/dL    A-G Ratio 1.1 0.8 - 1.7     NT-PRO BNP    Collection Time: 07/06/19  5:46 AM   Result Value Ref Range    NT pro-BNP 52 0 - 1,800 PG/ML   POC LACTIC ACID    Collection Time: 07/06/19  5:47 AM   Result Value Ref Range    Lactic Acid (POC) 0.81 0.40 - 2.00 mmol/L   EKG, 12 LEAD, INITIAL    Collection Time: 07/06/19  5:47 AM   Result Value Ref Range    Ventricular Rate 108 BPM    Atrial Rate 108 BPM    P-R Interval 162 ms    QRS Duration 86 ms    Q-T Interval 330 ms    QTC Calculation (Bezet) 442 ms    Calculated P Axis 68 degrees    Calculated R Axis 47 degrees    Calculated T Axis 65 degrees    Diagnosis       Sinus tachycardia  Otherwise normal ECG  When compared with ECG of 08-FEB-2019 12:15,  aberrant conduction is no longer present  T wave inversion no longer evident in Anterior leads     POC G3    Collection Time: 07/06/19  5:59 AM   Result Value Ref Range    Device: NASAL CANNULA      Flow rate (POC) 4 L/M    FIO2 (POC) 0.36 %    pH (POC) 7.345 (L) 7.35 - 7.45      pCO2 (POC) 40.7 35.0 - 45.0 MMHG    pO2 (POC) 71 (L) 80 - 100 MMHG    HCO3 (POC) 22.2 22 - 26 MMOL/L    sO2 (POC) 93 92 - 97 %    Base deficit (POC) 4 mmol/L    Allens test (POC) N/A      Total resp. rate 24      Site RIGHT RADIAL      Patient temp.  98.5      Specimen type (POC) ARTERIAL      Performed by [facility]        Radiologic Studies - chest X-ray; RLL Pneumonia      XR CHEST PORT    (Results Pending)     CT Results  (Last 48 hours)    None        CXR Results  (Last 48 hours)    None          Medications given in the ED-  Medications   albuterol-ipratropium (DUO-NEB) 2.5 MG-0.5 MG/3 ML (3 mL Nebulization Given 7/6/19 0605)         Medical Decision Making   I am the first provider for this patient. I reviewed the vital signs, available nursing notes, past medical history, past surgical history, family history and social history. Vital Signs-Reviewed the patient's vital signs. Pulse Oximetry Analysis - 96% on NC 4L O2     Cardiac Monitor:  Rate: 95 bpm  Rhythm: Sinus    EKG interpretation: (Preliminary)  6:19 AM   Sinus tach, Rate 108, Nonspecific ST-T       Records Reviewed: Nursing Notes and Old Medical Records    Provider Notes (Medical Decision Making):   Patient with COPD and multiple admissions for exacerbations. He does have history of pneumonia. He presents this time with similar episode of COPD exacerbation and has been coughing up brownish sputum. He has no chest pain, no fever. He is not septic in the ED however chest x-ray shows right lower lobe pneumonia. There is no leukocytosis. He was cultured and antibiotics initiated. He was given in addition to 2 nebs by EMS of DuoNeb, 1 more nebulizer [facility] ED. He does not have chest pain, do not think he has PE, and chest x-ray shows no pneumothorax or effusion. Procedures:  Procedures    ED Course:   6:19 AM Initial assessment performed. The patients presenting problems have been discussed, and they are in agreement with the care plan formulated and outlined with them. I have encouraged them to ask questions as they arise throughout their visit. CONSULT NOTE:   6:46 AM  Spoke with Jenn Martinez   Specialty: Hospitalist  Discussed pt's hx, disposition, and available diagnostic and imaging results over the telephone. Reviewed care plans. Consulting physician agrees with plans as outlined. Will admit.          Diagnosis and Disposition   6:43 AM  I have spent 45 minutes of critical care time involved in lab review, consultations with specialist, family decision-making, and documentation. During this entire length of time I was immediately available to the patient. Critical Care: The reason for providing this level of medical care for this critically ill patient was due a critical illness that impaired one or more vital organ systems such that there was a high probability of imminent or life threatening deterioration in the patients condition. This care involved high complexity decision making to assess, manipulate, and support vital system functions, to treat this degreee vital organ system failure and to prevent further life threatening deterioration of the patients condition. Critical Care Time: 45    Core Measures:  For Hospitalized Patients:    1. Hospitalization Decision Time:  The decision to hospitalize the patient was made  at 06:30 on 7/6/2019    2. Aspirin: Not given    6:42 AM  Patient is being admitted to the hospital by Maribel English. The results of their tests and reasons for their admission have been discussed with them and/or available family. They convey agreement and understanding for the need to be admitted and for their admission diagnosis. CONDITIONS ON ADMISSION:  Sepsis is not present at the time of admission. Deep Vein Thrombosis is not present at the time of admission. Thrombosis is not present at the time of admission. Urinary Tract Infection is not present at the time of admission. Pneumonia is present at the time of admission. MRSA is not present at the time of admission. Wound infection is not present at the time of admission. Pressure Ulcer is not present at the time of admission. CLINICAL IMPRESSION:    1. Respiratory distress    2. Pneumonia of right lower lobe due to infectious organism ([address]    3.  COPD exacerbation ([address]        PLAN: Admit to telemetry

## 2019-07-06 NOTE — PROGRESS NOTES
Summary -- Pt comfortable throughout shift. Weaned and maintained on RA with SpO2 in mid/high 90s. States significant improvement in sx since arrival to ED. Mild dyspnea with exertion, received 1 prn neb tx this afternoon and requested a second during bedside shift report, RT aware and en route. Tolerating abx and steroids well. FSBS <150 on afternoon check. Pt reports hx of intermittent hyperglycemia with steroid use. Patient Vitals for the past 12 hrs:   Temp Pulse Resp BP SpO2   07/06/19 1552 -- -- -- -- 96 %   07/06/19 1447 98.2 °F (36.8 °C) 94 22 155/89 96 %   07/06/19 1104 98.2 °F (36.8 °C) 88 20 139/72 98 %   07/06/19 0925 -- -- -- -- 95 %   07/06/19 0822 99 °F (37.2 °C) 97 22 140/72 96 %   07/06/19 0724 -- -- -- -- 98 %   07/06/19 0700 -- 97 -- 123/55 98 %   07/06/19 0630 -- 96 -- 136/56 95 %   07/06/19 0605 -- -- -- -- 93 %   07/06/19 0600 -- (!) 101 -- 116/53 93 %   07/06/19 0540 -- -- -- -- 92 %   07/06/19 0532 98.5 °F (36.9 °C) (!) 111 28 157/69 90 %     Bedside shift change report given to Alison Sexton RN (oncoming nurse) by João Castro RN (offgoing nurse). Report included the following information SBAR, Kardex, ED Summary, Intake/Output, MAR, Recent Results, Med Rec Status and Cardiac Rhythm NSR. Med Rec status pending as pt is unable to recall regimen. Wife to bring list tomorrow.

## 2019-07-07 ENCOUNTER — APPOINTMENT (OUTPATIENT)
Dept: GENERAL RADIOLOGY | Age: 74
DRG: 208 | End: 2019-07-07
Attending: FAMILY MEDICINE
Payer: MEDICARE

## 2019-07-07 ENCOUNTER — APPOINTMENT (OUTPATIENT)
Dept: CT IMAGING | Age: 74
DRG: 208 | End: 2019-07-07
Attending: FAMILY MEDICINE
Payer: MEDICARE

## 2019-07-07 ENCOUNTER — ANESTHESIA EVENT (OUTPATIENT)
Dept: ICU | Age: 74
DRG: 208 | End: 2019-07-07
Payer: MEDICARE

## 2019-07-07 ENCOUNTER — ANESTHESIA (OUTPATIENT)
Dept: ICU | Age: 74
DRG: 208 | End: 2019-07-07
Payer: MEDICARE

## 2019-07-07 ENCOUNTER — APPOINTMENT (OUTPATIENT)
Dept: GENERAL RADIOLOGY | Age: 74
DRG: 208 | End: 2019-07-07
Attending: INTERNAL MEDICINE
Payer: MEDICARE

## 2019-07-07 PROBLEM — J96.01 ACUTE RESPIRATORY FAILURE WITH HYPOXIA (HCC): Status: ACTIVE | Noted: 2019-07-07

## 2019-07-07 LAB
ANION GAP SERPL CALC-SCNC: 11 MMOL/L (ref 3–18)
ARTERIAL PATENCY WRIST A: ABNORMAL
ARTERIAL PATENCY WRIST A: ABNORMAL
ARTERIAL PATENCY WRIST A: YES
BASE DEFICIT BLD-SCNC: 5 MMOL/L
BASE DEFICIT BLD-SCNC: 8 MMOL/L
BASE DEFICIT BLD-SCNC: 8 MMOL/L
BDY SITE: ABNORMAL
BNP SERPL-MCNC: 375 PG/ML (ref 0–1800)
BODY TEMPERATURE: 97.5
BODY TEMPERATURE: 97.7
BUN SERPL-MCNC: 25 MG/DL (ref 7–18)
BUN/CREAT SERPL: 19 (ref 12–20)
CALCIUM SERPL-MCNC: 8.7 MG/DL (ref 8.5–10.1)
CHLORIDE SERPL-SCNC: 99 MMOL/L (ref 100–108)
CK MB CFR SERPL CALC: 6.9 % (ref 0–4)
CK MB SERPL-MCNC: 7 NG/ML (ref 5–25)
CK SERPL-CCNC: 101 U/L (ref 39–308)
CO2 SERPL-SCNC: 22 MMOL/L (ref 21–32)
CREAT SERPL-MCNC: 1.35 MG/DL (ref 0.6–1.3)
D DIMER PPP FEU-MCNC: 0.41 UG/ML(FEU)
ERYTHROCYTE [DISTWIDTH] IN BLOOD BY AUTOMATED COUNT: 13.7 % (ref 11.6–14.5)
EST. AVERAGE GLUCOSE BLD GHB EST-MCNC: 120 MG/DL
GAS FLOW.O2 O2 DELIVERY SYS: ABNORMAL L/MIN
GAS FLOW.O2 SETTING OXYMISER: 14 BPM
GLUCOSE BLD STRIP.AUTO-MCNC: 142 MG/DL (ref 70–110)
GLUCOSE BLD STRIP.AUTO-MCNC: 145 MG/DL (ref 70–110)
GLUCOSE BLD STRIP.AUTO-MCNC: 150 MG/DL (ref 70–110)
GLUCOSE BLD STRIP.AUTO-MCNC: 152 MG/DL (ref 70–110)
GLUCOSE SERPL-MCNC: 146 MG/DL (ref 74–99)
HBA1C MFR BLD: 5.8 % (ref 4.2–5.6)
HCO3 BLD-SCNC: 18.7 MMOL/L (ref 22–26)
HCO3 BLD-SCNC: 19.5 MMOL/L (ref 22–26)
HCO3 BLD-SCNC: 21.7 MMOL/L (ref 22–26)
HCT VFR BLD AUTO: 34.3 % (ref 36–48)
HGB BLD-MCNC: 11.8 G/DL (ref 13–16)
INR PPP: 1 (ref 0.8–1.2)
INSPIRATION.DURATION SETTING TIME VENT: 0.8 SEC
LACTATE SERPL-SCNC: 1.4 MMOL/L (ref 0.4–2)
MCH RBC QN AUTO: 28 PG (ref 24–34)
MCHC RBC AUTO-ENTMCNC: 34.4 G/DL (ref 31–37)
MCV RBC AUTO: 81.5 FL (ref 74–97)
O2/TOTAL GAS SETTING VFR VENT: 0.21 %
O2/TOTAL GAS SETTING VFR VENT: 0.5 %
O2/TOTAL GAS SETTING VFR VENT: 50 %
PCO2 BLD: 38.6 MMHG (ref 35–45)
PCO2 BLD: 44.3 MMHG (ref 35–45)
PCO2 BLD: 45.5 MMHG (ref 35–45)
PEEP RESPIRATORY: 5 CMH2O
PEEP RESPIRATORY: 7 CMH2O
PH BLD: 7.25 [PH] (ref 7.35–7.45)
PH BLD: 7.28 [PH] (ref 7.35–7.45)
PH BLD: 7.29 [PH] (ref 7.35–7.45)
PIP ISTAT,IPIP: 13
PIP ISTAT,IPIP: 15
PLATELET # BLD AUTO: 327 K/UL (ref 135–420)
PMV BLD AUTO: 8.4 FL (ref 9.2–11.8)
PO2 BLD: 101 MMHG (ref 80–100)
PO2 BLD: 218 MMHG (ref 80–100)
PO2 BLD: 94 MMHG (ref 80–100)
POTASSIUM SERPL-SCNC: 4.3 MMOL/L (ref 3.5–5.5)
PRESSURE CONTROL, IPC: YES
PRESSURE SUPPORT SETTING VENT: 5 CMH2O
PROTHROMBIN TIME: 12.4 SEC (ref 11.5–15.2)
RBC # BLD AUTO: 4.21 M/UL (ref 4.7–5.5)
SAO2 % BLD: 100 % (ref 92–97)
SAO2 % BLD: 97 % (ref 92–97)
SAO2 % BLD: 97 % (ref 92–97)
SERVICE CMNT-IMP: ABNORMAL
SODIUM SERPL-SCNC: 132 MMOL/L (ref 136–145)
SPECIMEN TYPE: ABNORMAL
TOTAL RESP. RATE, ITRR: 26
TOTAL RESP. RATE, ITRR: 26
TOTAL RESP. RATE, ITRR: 32
TROPONIN I SERPL-MCNC: <0.02 NG/ML (ref 0–0.04)
VENTILATION MODE VENT: ABNORMAL
WBC # BLD AUTO: 7.4 K/UL (ref 4.6–13.2)

## 2019-07-07 PROCEDURE — 5A1945Z RESPIRATORY VENTILATION, 24-96 CONSECUTIVE HOURS: ICD-10-PCS | Performed by: FAMILY MEDICINE

## 2019-07-07 PROCEDURE — 85610 PROTHROMBIN TIME: CPT

## 2019-07-07 PROCEDURE — 94760 N-INVAS EAR/PLS OXIMETRY 1: CPT

## 2019-07-07 PROCEDURE — 74011250636 HC RX REV CODE- 250/636: Performed by: INTERNAL MEDICINE

## 2019-07-07 PROCEDURE — 82962 GLUCOSE BLOOD TEST: CPT

## 2019-07-07 PROCEDURE — 74018 RADEX ABDOMEN 1 VIEW: CPT

## 2019-07-07 PROCEDURE — 36415 COLL VENOUS BLD VENIPUNCTURE: CPT

## 2019-07-07 PROCEDURE — 83036 HEMOGLOBIN GLYCOSYLATED A1C: CPT

## 2019-07-07 PROCEDURE — 74011250636 HC RX REV CODE- 250/636

## 2019-07-07 PROCEDURE — 36600 WITHDRAWAL OF ARTERIAL BLOOD: CPT

## 2019-07-07 PROCEDURE — 74011250636 HC RX REV CODE- 250/636: Performed by: FAMILY MEDICINE

## 2019-07-07 PROCEDURE — 0BH17EZ INSERTION OF ENDOTRACHEAL AIRWAY INTO TRACHEA, VIA NATURAL OR ARTIFICIAL OPENING: ICD-10-PCS | Performed by: FAMILY MEDICINE

## 2019-07-07 PROCEDURE — 77030034850

## 2019-07-07 PROCEDURE — 85379 FIBRIN DEGRADATION QUANT: CPT

## 2019-07-07 PROCEDURE — 82803 BLOOD GASES ANY COMBINATION: CPT

## 2019-07-07 PROCEDURE — 94640 AIRWAY INHALATION TREATMENT: CPT

## 2019-07-07 PROCEDURE — 74011250637 HC RX REV CODE- 250/637: Performed by: FAMILY MEDICINE

## 2019-07-07 PROCEDURE — 77010033678 HC OXYGEN DAILY

## 2019-07-07 PROCEDURE — 74011000250 HC RX REV CODE- 250: Performed by: FAMILY MEDICINE

## 2019-07-07 PROCEDURE — 74011636637 HC RX REV CODE- 636/637: Performed by: INTERNAL MEDICINE

## 2019-07-07 PROCEDURE — 80048 BASIC METABOLIC PNL TOTAL CA: CPT

## 2019-07-07 PROCEDURE — 87070 CULTURE OTHR SPECIMN AEROBIC: CPT

## 2019-07-07 PROCEDURE — 74011000258 HC RX REV CODE- 258: Performed by: FAMILY MEDICINE

## 2019-07-07 PROCEDURE — 83880 ASSAY OF NATRIURETIC PEPTIDE: CPT

## 2019-07-07 PROCEDURE — 71275 CT ANGIOGRAPHY CHEST: CPT

## 2019-07-07 PROCEDURE — 94660 CPAP INITIATION&MGMT: CPT

## 2019-07-07 PROCEDURE — 74011000250 HC RX REV CODE- 250: Performed by: INTERNAL MEDICINE

## 2019-07-07 PROCEDURE — 74011636320 HC RX REV CODE- 636/320: Performed by: FAMILY MEDICINE

## 2019-07-07 PROCEDURE — C9113 INJ PANTOPRAZOLE SODIUM, VIA: HCPCS | Performed by: FAMILY MEDICINE

## 2019-07-07 PROCEDURE — 77030035694 HC MSK BIPAP FLL FAC PERFMAX PHIL -B

## 2019-07-07 PROCEDURE — 85027 COMPLETE CBC AUTOMATED: CPT

## 2019-07-07 PROCEDURE — 83605 ASSAY OF LACTIC ACID: CPT

## 2019-07-07 PROCEDURE — 82550 ASSAY OF CK (CPK): CPT

## 2019-07-07 PROCEDURE — 94002 VENT MGMT INPAT INIT DAY: CPT

## 2019-07-07 PROCEDURE — 71045 X-RAY EXAM CHEST 1 VIEW: CPT

## 2019-07-07 PROCEDURE — 65610000006 HC RM INTENSIVE CARE

## 2019-07-07 PROCEDURE — 87040 BLOOD CULTURE FOR BACTERIA: CPT

## 2019-07-07 RX ORDER — BUDESONIDE 0.5 MG/2ML
500 INHALANT ORAL
Status: DISCONTINUED | OUTPATIENT
Start: 2019-07-07 | End: 2019-07-12 | Stop reason: HOSPADM

## 2019-07-07 RX ORDER — MIDAZOLAM HYDROCHLORIDE 1 MG/ML
1-2 INJECTION, SOLUTION INTRAMUSCULAR; INTRAVENOUS
Status: DISCONTINUED | OUTPATIENT
Start: 2019-07-07 | End: 2019-07-09

## 2019-07-07 RX ORDER — FENTANYL CITRATE 50 UG/ML
50 INJECTION, SOLUTION INTRAMUSCULAR; INTRAVENOUS
Status: DISCONTINUED | OUTPATIENT
Start: 2019-07-07 | End: 2019-07-09

## 2019-07-07 RX ORDER — INSULIN LISPRO 100 [IU]/ML
INJECTION, SOLUTION INTRAVENOUS; SUBCUTANEOUS EVERY 6 HOURS
Status: DISCONTINUED | OUTPATIENT
Start: 2019-07-07 | End: 2019-07-09

## 2019-07-07 RX ORDER — VANCOMYCIN HYDROCHLORIDE
1250
Status: DISCONTINUED | OUTPATIENT
Start: 2019-07-07 | End: 2019-07-10

## 2019-07-07 RX ORDER — PROPOFOL 10 MG/ML
INJECTION, EMULSION INTRAVENOUS
Status: COMPLETED
Start: 2019-07-07 | End: 2019-07-07

## 2019-07-07 RX ORDER — CHLORHEXIDINE GLUCONATE 1.2 MG/ML
10 RINSE ORAL EVERY 12 HOURS
Status: DISCONTINUED | OUTPATIENT
Start: 2019-07-07 | End: 2019-07-09

## 2019-07-07 RX ORDER — MAGNESIUM SULFATE 100 %
4 CRYSTALS MISCELLANEOUS AS NEEDED
Status: DISCONTINUED | OUTPATIENT
Start: 2019-07-07 | End: 2019-07-12 | Stop reason: HOSPADM

## 2019-07-07 RX ORDER — IPRATROPIUM BROMIDE AND ALBUTEROL SULFATE 2.5; .5 MG/3ML; MG/3ML
3 SOLUTION RESPIRATORY (INHALATION)
Status: DISCONTINUED | OUTPATIENT
Start: 2019-07-07 | End: 2019-07-11

## 2019-07-07 RX ORDER — VANCOMYCIN/0.9 % SOD CHLORIDE 1.5G/250ML
1500 PLASTIC BAG, INJECTION (ML) INTRAVENOUS ONCE
Status: COMPLETED | OUTPATIENT
Start: 2019-07-07 | End: 2019-07-07

## 2019-07-07 RX ORDER — DEXTROSE MONOHYDRATE 100 MG/ML
100 INJECTION, SOLUTION INTRAVENOUS AS NEEDED
Status: DISCONTINUED | OUTPATIENT
Start: 2019-07-07 | End: 2019-07-12 | Stop reason: HOSPADM

## 2019-07-07 RX ORDER — MIDAZOLAM HYDROCHLORIDE 1 MG/ML
INJECTION, SOLUTION INTRAMUSCULAR; INTRAVENOUS
Status: COMPLETED
Start: 2019-07-07 | End: 2019-07-07

## 2019-07-07 RX ORDER — VANCOMYCIN HYDROCHLORIDE
1250 EVERY 12 HOURS
Status: DISCONTINUED | OUTPATIENT
Start: 2019-07-07 | End: 2019-07-07 | Stop reason: DRUGHIGH

## 2019-07-07 RX ORDER — INSULIN LISPRO 100 [IU]/ML
INJECTION, SOLUTION INTRAVENOUS; SUBCUTANEOUS ONCE
Status: ACTIVE | OUTPATIENT
Start: 2019-07-07 | End: 2019-07-07

## 2019-07-07 RX ORDER — PROPOFOL 10 MG/ML
0-50 VIAL (ML) INTRAVENOUS
Status: DISCONTINUED | OUTPATIENT
Start: 2019-07-07 | End: 2019-07-09

## 2019-07-07 RX ORDER — MIDAZOLAM HYDROCHLORIDE 1 MG/ML
2 INJECTION, SOLUTION INTRAMUSCULAR; INTRAVENOUS ONCE
Status: COMPLETED | OUTPATIENT
Start: 2019-07-07 | End: 2019-07-07

## 2019-07-07 RX ADMIN — PROPOFOL 50 MCG/KG/MIN: 10 INJECTION, EMULSION INTRAVENOUS at 23:09

## 2019-07-07 RX ADMIN — ALBUTEROL SULFATE 2.5 MG: 2.5 SOLUTION RESPIRATORY (INHALATION) at 05:39

## 2019-07-07 RX ADMIN — HEPARIN SODIUM 5000 UNITS: 5000 INJECTION INTRAVENOUS; SUBCUTANEOUS at 01:17

## 2019-07-07 RX ADMIN — MIDAZOLAM HYDROCHLORIDE 2 MG/HR: 5 INJECTION, SOLUTION INTRAMUSCULAR; INTRAVENOUS at 06:30

## 2019-07-07 RX ADMIN — PROPOFOL 50 MCG/KG/MIN: 10 INJECTION, EMULSION INTRAVENOUS at 07:33

## 2019-07-07 RX ADMIN — CHLORHEXIDINE GLUCONATE 10 ML: 1.2 RINSE ORAL at 21:04

## 2019-07-07 RX ADMIN — ACETAMINOPHEN 650 MG: 325 TABLET ORAL at 01:17

## 2019-07-07 RX ADMIN — PROPOFOL 50 MCG/KG/MIN: 10 INJECTION, EMULSION INTRAVENOUS at 18:38

## 2019-07-07 RX ADMIN — IPRATROPIUM BROMIDE AND ALBUTEROL SULFATE 3 ML: .5; 3 SOLUTION RESPIRATORY (INHALATION) at 19:32

## 2019-07-07 RX ADMIN — LEVOFLOXACIN 750 MG: 5 INJECTION, SOLUTION INTRAVENOUS at 07:08

## 2019-07-07 RX ADMIN — IPRATROPIUM BROMIDE AND ALBUTEROL SULFATE 3 ML: .5; 3 SOLUTION RESPIRATORY (INHALATION) at 16:33

## 2019-07-07 RX ADMIN — PROPOFOL 50 MCG/KG/MIN: 10 INJECTION, EMULSION INTRAVENOUS at 04:55

## 2019-07-07 RX ADMIN — MIDAZOLAM HYDROCHLORIDE 1 MG: 1 INJECTION, SOLUTION INTRAMUSCULAR; INTRAVENOUS at 21:14

## 2019-07-07 RX ADMIN — SODIUM CHLORIDE 100 ML/HR: 900 INJECTION, SOLUTION INTRAVENOUS at 12:00

## 2019-07-07 RX ADMIN — IPRATROPIUM BROMIDE AND ALBUTEROL SULFATE 3 ML: .5; 3 SOLUTION RESPIRATORY (INHALATION) at 07:37

## 2019-07-07 RX ADMIN — METHYLPREDNISOLONE SODIUM SUCCINATE 125 MG: 125 INJECTION, POWDER, FOR SOLUTION INTRAMUSCULAR; INTRAVENOUS at 07:08

## 2019-07-07 RX ADMIN — HEPARIN SODIUM 5000 UNITS: 5000 INJECTION INTRAVENOUS; SUBCUTANEOUS at 16:33

## 2019-07-07 RX ADMIN — MIDAZOLAM HYDROCHLORIDE 5 MG/HR: 5 INJECTION, SOLUTION INTRAMUSCULAR; INTRAVENOUS at 15:28

## 2019-07-07 RX ADMIN — HEPARIN SODIUM 5000 UNITS: 5000 INJECTION INTRAVENOUS; SUBCUTANEOUS at 08:23

## 2019-07-07 RX ADMIN — METHYLPREDNISOLONE SODIUM SUCCINATE 60 MG: 125 INJECTION, POWDER, FOR SOLUTION INTRAMUSCULAR; INTRAVENOUS at 18:04

## 2019-07-07 RX ADMIN — SODIUM CHLORIDE 40 MG: 9 INJECTION INTRAMUSCULAR; INTRAVENOUS; SUBCUTANEOUS at 08:22

## 2019-07-07 RX ADMIN — MIDAZOLAM HYDROCHLORIDE 2 MG: 1 INJECTION, SOLUTION INTRAMUSCULAR; INTRAVENOUS at 05:10

## 2019-07-07 RX ADMIN — CHLORHEXIDINE GLUCONATE 10 ML: 1.2 RINSE ORAL at 09:29

## 2019-07-07 RX ADMIN — MIDAZOLAM HYDROCHLORIDE 1 MG: 1 INJECTION, SOLUTION INTRAMUSCULAR; INTRAVENOUS at 05:20

## 2019-07-07 RX ADMIN — BUDESONIDE 500 MCG: 0.5 INHALANT RESPIRATORY (INHALATION) at 19:32

## 2019-07-07 RX ADMIN — METHYLPREDNISOLONE SODIUM SUCCINATE 60 MG: 125 INJECTION, POWDER, FOR SOLUTION INTRAMUSCULAR; INTRAVENOUS at 11:45

## 2019-07-07 RX ADMIN — IPRATROPIUM BROMIDE AND ALBUTEROL SULFATE 3 ML: .5; 3 SOLUTION RESPIRATORY (INHALATION) at 11:45

## 2019-07-07 RX ADMIN — MIDAZOLAM HYDROCHLORIDE 2 MG: 1 INJECTION, SOLUTION INTRAMUSCULAR; INTRAVENOUS at 06:27

## 2019-07-07 RX ADMIN — ALBUTEROL SULFATE 2.5 MG: 2.5 SOLUTION RESPIRATORY (INHALATION) at 02:40

## 2019-07-07 RX ADMIN — PIPERACILLIN SODIUM,TAZOBACTAM SODIUM 3.38 G: 3; .375 INJECTION, POWDER, FOR SOLUTION INTRAVENOUS at 15:30

## 2019-07-07 RX ADMIN — SODIUM CHLORIDE 1000 ML: 900 INJECTION, SOLUTION INTRAVENOUS at 03:51

## 2019-07-07 RX ADMIN — IOPAMIDOL 100 ML: 755 INJECTION, SOLUTION INTRAVENOUS at 10:44

## 2019-07-07 RX ADMIN — FENTANYL CITRATE 50 MCG: 50 INJECTION, SOLUTION INTRAMUSCULAR; INTRAVENOUS at 17:19

## 2019-07-07 RX ADMIN — PIPERACILLIN SODIUM,TAZOBACTAM SODIUM 3.38 G: 3; .375 INJECTION, POWDER, FOR SOLUTION INTRAVENOUS at 08:22

## 2019-07-07 RX ADMIN — PROPOFOL 50 MCG/KG/MIN: 10 INJECTION, EMULSION INTRAVENOUS at 11:16

## 2019-07-07 RX ADMIN — PIPERACILLIN SODIUM,TAZOBACTAM SODIUM 3.38 G: 3; .375 INJECTION, POWDER, FOR SOLUTION INTRAVENOUS at 01:17

## 2019-07-07 RX ADMIN — BUDESONIDE 500 MCG: 0.5 INHALANT RESPIRATORY (INHALATION) at 07:37

## 2019-07-07 RX ADMIN — VANCOMYCIN HYDROCHLORIDE 1500 MG: 10 INJECTION, POWDER, LYOPHILIZED, FOR SOLUTION INTRAVENOUS at 03:45

## 2019-07-07 RX ADMIN — PROPOFOL 50 MCG/KG/MIN: 10 INJECTION, EMULSION INTRAVENOUS at 14:59

## 2019-07-07 RX ADMIN — MIDAZOLAM HYDROCHLORIDE 2 MG: 1 INJECTION, SOLUTION INTRAMUSCULAR; INTRAVENOUS at 05:12

## 2019-07-07 RX ADMIN — INSULIN LISPRO 2 UNITS: 100 INJECTION, SOLUTION INTRAVENOUS; SUBCUTANEOUS at 14:22

## 2019-07-07 RX ADMIN — FENTANYL CITRATE 50 MCG: 50 INJECTION, SOLUTION INTRAMUSCULAR; INTRAVENOUS at 10:03

## 2019-07-07 NOTE — PROGRESS NOTES
Hospitalist Progress Note    Patient: Jina Velez MRN: [id number]  CSN: [id number]    YOB: 1945  Age: 74 y.o. Sex: male    DOA: 7/6/2019 LOS:  LOS: 1 day                Assessment and Plan:    Principal Problem:    Pneumonia (7/6/2019)    Active Problems:    COPD with acute exacerbation ([facility] [address] (7/6/2019)      Acute respiratory failure with hypoxia ([facility] [address] (7/7/2019)        Pulmonary: management per ICu. Steroids, antibiotics, nebs and vent support. He has had months of inflamamtion so this may be difficult to fix       Continue outpatient therapy for hypertension       Chief complaint:  Shortness of breath        Subjective:    He is sedated and intubated       Review of systems:    General: No fevers or chills. Cardiovascular: No chest pain or pressure. No palpitations. Pulmonary: No shortness of breath. Gastrointestinal: No nausea, vomiting. Objective:    Vital signs/Intake and Output:  Visit Vitals  /67   Pulse 77   Temp 98.3 °F (36.8 °C)   Resp 16   Ht 5' 8\" (1.727 m)   Wt 83.9 kg (185 lb)   SpO2 100%   BMI 28.13 kg/m²     Current Shift:  07/07 0701 - 07/07 1900  In: 2116.6 [I.V.:2116.6]  Out: -   Last three shifts:  07/05 1901 - 07/07 0700  In: 2192 [P. O.:942; I.V.:1250]  Out: 2165 [Urine:1275]    Physical Exam:  General: NAD, AAOx3. Non-toxic. HEENT: NC/AT. PERRLA, EOMI.  MMM. Lungs: Nml inspection. CTA B/L. No wheezing, rales or rhonchi. Heart:  S1S2 RRR,  PMI mid 5th IC space. No M/RG. Abdomen: Soft, NT/ND.  BS+. No peritoneal signs. Extremities: No C/C/E. Psych:   Nml affect. Neurologic:  2-12 intact. Strength 5/5 throughout.   Sensation symmetrical.          Labs: Results:       Chemistry Recent Labs     07/07/19  0225 07/06/19  0546   * 142*   * 132*   K 4.3 4.0   CL 99* 99*   CO2 22 25   BUN 25* 21*   CREA 1.35* 1.17   CA 8.7 8.6   AGAP 11 8   BUCR 19 18   AP  --  87   TP  --  6.1*   ALB  --  3.2*   GLOB  --  2.9   AGRAT  --  1.1      CBC w/Diff Recent Labs     07/07/19  0225 07/06/19  0546   WBC 7.4 10.1   RBC 4.21* 4.20*   HGB 11.8* 11.9*   HCT 34.3* 34.9*    287   GRANS  --  66   LYMPH  --  14*   EOS  --  11*      Cardiac Enzymes Recent Labs     07/07/19  0400 07/06/19  0546    85   CKND1 6.9* 4.7*      Coagulation Recent Labs     07/07/19  0400   PTP 12.4   INR 1.0       Lipid Panel No results found for: CHOL, CHOLPOCT, CHOLX, CHLST, CHOLV, 678108, HDL, LDL, LDLC, DLDLP, 191216, VLDLC, VLDL, TGLX, TRIGL, TRIGP, TGLPOCT, CHHD, CHHDX   BNP No results for input(s): BNPP in the last 72 hours.    Liver Enzymes Recent Labs     07/06/19  0546   TP 6.1*   ALB 3.2*   AP 87   SGOT 12*      Thyroid Studies No results found for: T4, T3U, TSH, TSHEXT     Procedures/imaging: see electronic medical records for all procedures/Xrays and details which were not copied into this note but were reviewed prior to creation of Plan

## 2019-07-07 NOTE — PROGRESS NOTES
0720 Bedside shift change reportrecieved from Renny Garcia RN. Report included the following information SBAR, Kardex, Procedure Summary, Intake/Output, MAR, Recent Results, Med Rec Status, Cardiac Rhythm SR and Alarm Parameters .      0800 Assessment complete    1200 reassessment complete    1600 Reassessment complete

## 2019-07-07 NOTE — PROGRESS NOTES
0310 Telephone report from Burchard RADHIKA Shen, patient in respiratory distress per Dr Conner Cano and will need to come to ICU emergently  0319 Patient arrived in ICU  0330 Patient placed on bipap by RT, satting 100%, remains tachypneic in the high 20s-30s. BP and HR elevated, bedside suction set up with yankauer for oral secretions, patient was incontinent of urine and stool upon arrival, stat labs and xrays will be ordered by physician, CTA when stable   0400 Repeat ABGs, Dr Conner Cano wants CTA done stat, per RT patient is not stable enough to go, cannot lie flat without increased distress  0440 Patient's respiratory status not improving on bipap, Dr Conner Cano spoke with patient about needed to be intubated to help him breathe, patient agrees, anesthesia paged overhead  3286 Anesthesia arrived and patient intubed  0500 Patient starting to arouse, is restless and agitated, will give PRN versed as ordered by Dr Conner Cano, and IV propofol  0510 2 mg versed given  0512 Another mg Versed given per Dr Conner Cano for increased agitation after intubation  0515 Nonviolent soft wrist restraints initiated after repeated attempts by patient to reach for lines and tubes  0627 2 mg versed given for increased agitation despite IV propofol  0630 IV versed started per PCA  0730 Bedside and Verbal shift change report given to Aneta Herndon RN (oncoming nurse) by Gonzalez Martin RN (offgoing nurse). Report included the following information SBAR, Intake/Output, MAR, Recent Results, Cardiac Rhythm NSR and Quality Measures.

## 2019-07-07 NOTE — PROGRESS NOTES
Verbal report given to Francisco RN on Galina Mccann being transferred to ICU     Report consisted of patients Situation, Background, Assessment and Recommendations(SBAR).    Information from the following report(s) SBAR, Kardex, ED Summary, MAR, Recent Results and Cardiac Rhythm. Opportunity provided for questions and clarification.     Transferred in bed with O2 to ICU

## 2019-07-07 NOTE — PROGRESS NOTES
6377- Patient called nurses station requesting respiratory treatment. Patient sitting upright in bed, resting. No distress observed, oxygen saturation at 92%. Respiratory therapist was called to inform that patient is requesting a respiratory treatment. 1606- Patient called nurses station. RN came to bedside. Patient was observed trying to stand up complaining of SOB. RN guided patient back into bed. Respiratory therapy called and requested if RN could administer RT treatment. Respiratory therapy reported that she will be coming to patients bedside. RN monitored patient. 0240-Respiratory therapist arrived to give patient treatment. While respiratory therapist was in the room administering treatment patient was trying to stand up saying \"I can't breath\". RN came to bedside. Patient alert and anxious. No pain reported. Lung sounds wheezing bilaterally. VS taken. Patient placed on 4L NC. Physician called to assess patient at bedside, SBAR provided. Respiratory performed ABG at bedside. Physician assessed patient and gave verbal order to transfer to ICU for BIPAP treatment.

## 2019-07-07 NOTE — CONSULTS
[facility]  Pulmonary, Critical Care, and Sleep Medicine    Initial Patient Consult    Name: Renetta Ortiz MRN: [id number]   : 1945 Hospital: [facility] [place]   Date: 2019  Room: 102/     Subjective: This patient has been seen and evaluated at the request of Dr.K Davis Nash for patient on vent support. Patient is a 74 y.o. 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 Patient was admitted yesterday with COPD exacerbation to tele floor. He seemed to have worsened overnight, failed BIPAP and was subsequently intubated. Patient in icu, intubated and sedated now. BP stable. No cough or hemoptysis. Stable vent settings - now on VCV mode; did not tolerate PCV mode per RT; fio2 at 40%. Past Medical History:   Diagnosis Date    Cancer [facility])     prostate    COPD (chronic obstructive pulmonary disease) ([facility] [address]     Hypertension     Pneumonia     Radiation effect       Past Surgical History:   Procedure Laterality Date    HX HERNIA REPAIR        Prior to Admission medications    Medication Sig Start Date End Date Taking? Authorizing Provider   albuterol (PROVENTIL VENTOLIN) 2.5 mg /3 mL (0.083 %) nebulizer solution 3 mL by Nebulization route every two (2) hours as needed for Wheezing. 19   Sebastien Egan DO   albuterol-ipratropium (DUO-NEB) 2.5 mg-0.5 mg/3 ml nebu 3 mL by Nebulization route every four (4) hours as needed. 19   Sebastien Egan DO   chlorpheniramine-HYDROcodone (TUSSIONEX) 10-8 mg/5 mL suspension Take 5 mL by mouth every twelve (12) hours as needed for Cough.  Max Daily Amount: 10 mL. 19   Sebastien Egan DO   predniSONE (DELTASONE) 20 mg tablet 2 tabs daily x 3 days then 1 tab daily x 3 days then 1/2 tab daily x 3 days 19   Sebastien Egan DO   fluticasone furoate (ARNUITY ELLIPTA) 100 mcg/actuation dsdv inhaler Take 1 Puff by inhalation daily. 10/24/18   Yfn Callaway PA-C   acetaminophen (TYLENOL ARTHRITIS PAIN) 650 mg TbER Take 1 Tab by mouth every eight (8) hours. 9/8/18   Torrie TREJO PA-C   furosemide (LASIX) 20 mg tablet Take 20 mg by mouth daily. Blayne Bran MD   albuterol (PROVENTIL HFA, VENTOLIN HFA, PROAIR HFA) 90 mcg/actuation inhaler Take 2 Puffs by inhalation every four (4) hours as needed for Wheezing or Shortness of Breath. 4/23/18   Yfn Callaway PA-C   ipratropium (ATROVENT) 0.03 % nasal spray 2 Sprays every twelve (12) hours. Blayne Bran MD   tamsulosin (FLOMAX) 0.4 mg capsule Take 0.4 mg by mouth daily. Blayne Bran MD   albuterol (PROVENTIL VENTOLIN) 2.5 mg /3 mL (0.083 %) nebulizer solution 3 mL by Nebulization route every four (4) hours as needed for Wheezing. 12/23/15   TRINA Bernal   chlorthalidone (HYGROTEN) 25 mg tablet Take  by mouth daily.     Blayne Bran MD   Nebulizer & Compressor machine Dispense: 1 nebulizer machine w all tubing necessary 10/6/14   Dorothy Monroet, DO     Allergies   Allergen Reactions    Aspirin Other (comments)     \"messes my stomach up\"      Social History     Tobacco Use    Smoking status: Former Smoker     Types: Cigarettes    Smokeless tobacco: Never Used   Substance Use Topics    Alcohol use: No      Family History   Problem Relation Age of Onset    Heart Disease Mother         Current Facility-Administered Medications   Medication Dose Route Frequency    Vancomycin- Pharmacy to dose  1 Each Other Rx Dosing/Monitoring    vancomycin (VANCOCIN) 1250 mg in  ml infusion  1,250 mg IntraVENous Q18H    propofol (DIPRIVAN) infusion  0-50 mcg/kg/min IntraVENous TITRATE    NOREPINephrine (LEVOPHED) 8 mg in dextrose 5% 250 mL infusion  2-16 mcg/min IntraVENous TITRATE    midazolam in normal saline (VERSED) 2 mg/mL infusion  1-10 mg/hr IntraVENous TITRATE    budesonide (PULMICORT) 500 mcg/2 ml nebulizer suspension  500 mcg Nebulization BID RT    albuterol-ipratropium (DUO-NEB) 2.5 MG-0.5 MG/3 ML  3 mL Nebulization Q4H RT    piperacillin-tazobactam (ZOSYN) 3.375 g in 0.9% sodium chloride (MBP/ADV) 100 mL MBP  3.375 g IntraVENous Q6H    levoFLOXacin (LEVAQUIN) 750 mg in D5W IVPB  750 mg IntraVENous Q24H    methylPREDNISolone (PF) (Solu-MEDROL) injection 125 mg  125 mg IntraVENous Q8H    0.9% sodium chloride infusion  100 mL/hr IntraVENous CONTINUOUS    heparin (porcine) injection 5,000 Units  5,000 Units SubCUTAneous Q8H    acetaminophen (TYLENOL) tablet 650 mg  650 mg Oral Q8H    doxazosin (CARDURA) tablet 1 mg  1 mg Oral DAILY    pantoprazole (PROTONIX) 40 mg in sodium chloride 0.9% 10 mL injection  40 mg IntraVENous DAILY       Latest lactic acid:   Lactic acid   Date Value Ref Range Status   2019 1.4 0.4 - 2.0 MMOL/L Final   2018 0.9 0.4 - 2.0 MMOL/L Final   2018 1.6 0.4 - 2.0 MMOL/L Final       Review of Systems:  Limited due to patient condition     Objective:   Vital Signs:    Visit Vitals  /68   Pulse 76   Temp 98.3 °F (36.8 °C)   Resp 24   Ht 5' 8\" (1.727 m)   Wt 83.9 kg (185 lb)   SpO2 97%   BMI 28.13 kg/m²       O2 Device: Endotracheal tube, Ventilator   O2 Flow Rate (L/min): 0 l/min   Temp (24hrs), Av °F (36.7 °C), Min:97.5 °F (36.4 °C), Max:98.3 °F (36.8 °C)       Intake/Output:   Last shift:      701 - 1900  In: 2116.6 [I.V.:2116.6]  Out: -   Last 3 shifts: 1901 -  07  In: 2192 [P. O.:942; I.V.:1250]  Out: 1275 [Urine:1275]    Intake/Output Summary (Last 24 hours) at 2019 0825  Last data filed at 2019 0730  Gross per 24 hour   Intake 4308.61 ml   Output 1275 ml   Net 3033.61 ml        ABG:    Lab Results   Component Value Date/Time    PHI 7.252 (L) 2019 07:28 AM    PCO2I 44.3 2019 07:28 AM    PO2I 101 (H) 2019 07:28 AM    HCO3I 19.5 (L) 2019 07:28 AM    FIO2I 50 2019 07:28 AM     Ventilator Mode: Assist control, VC+(CHANGED FROM PC TO AC/VC+)  Respiratory Rate  Back-Up Rate: 14  Insp Time (sec): 0.9 sec  I:E Ratio: 1:3.69  Ventilator Volumes  Vt Set (ml): 450 ml  Vt Exhaled (Machine Breath) (ml): 574 ml  Vt Spont (ml): 713 ml  Ve Observed (l/min): 14.1 l/min  Ventilator Pressures  PC Set: 15  PIP Observed (cm H2O): 23 cm H2O  Plateau Pressure (cm H2O): 18 cm H2O  MAP (cm H2O): 11  PEEP/VENT (cm H2O): 5 cm H20       Physical Exam:   Comfortable; on vent support; acyanotic  HEENT: pupils not dilated, reactive, no scleral jaundice, moist oral mucosa, no nasal drainage; orotracheal and orogastric tubes  Neck: No adenopathy or thyroid swelling  CVS: S1S2 no murmurs; JVD not elevated  RS: Mod to poor air entry bilaterally, symmetrical BS; mild bilateral expiratory wheezes, mild bi-basal crackles  Abd: soft, non tender, no hepatosplenomegaly, no abd distension, no guarding or rigidity, bowel sounds heard  Neuro: intubated, sedated, limited exam  Extrm: no leg edema or swelling or clubbing  Skin: no rash  Lymphatic: no cervical or supraclavicular adenopathy    Telemetry:     Data review:     Recent Results (from the past 24 hour(s))   GLUCOSE, POC    Collection Time: 07/06/19  4:29 PM   Result Value Ref Range    Glucose (POC) 138 (H) 70 - 110 mg/dL   CBC W/O DIFF    Collection Time: 07/07/19  2:25 AM   Result Value Ref Range    WBC 7.4 4.6 - 13.2 K/uL    RBC 4.21 (L) 4.70 - 5.50 M/uL    HGB 11.8 (L) 13.0 - 16.0 g/dL    HCT 34.3 (L) 36.0 - 48.0 %    MCV 81.5 74.0 - 97.0 FL    MCH 28.0 24.0 - 34.0 PG    MCHC 34.4 31.0 - 37.0 g/dL    RDW 13.7 11.6 - 14.5 %    PLATELET 605 376 - 712 K/uL    MPV 8.4 (L) 9.2 - 38.4 FL   METABOLIC PANEL, BASIC    Collection Time: 07/07/19  2:25 AM   Result Value Ref Range    Sodium 132 (L) 136 - 145 mmol/L    Potassium 4.3 3.5 - 5.5 mmol/L    Chloride 99 (L) 100 - 108 mmol/L    CO2 22 21 - 32 mmol/L    Anion gap 11 3.0 - 18 mmol/L    Glucose 146 (H) 74 - 99 mg/dL    BUN 25 (H) 7.0 - 18 MG/DL    Creatinine 1.35 (H) 0.6 - 1.3 MG/DL    BUN/Creatinine ratio 19 12 - 20      GFR est AA >60 >60 ml/min/1.73m2    GFR est non-AA 52 (L) >60 ml/min/1.73m2    Calcium 8.7 8.5 - 10.1 MG/DL   POC G3    Collection Time: 07/07/19  2:59 AM   Result Value Ref Range    Device: ROOM AIR      FIO2 (POC) 0.21 %    pH (POC) 7.290 (L) 7.35 - 7.45      pCO2 (POC) 38.6 35.0 - 45.0 MMHG    pO2 (POC) 94 80 - 100 MMHG    HCO3 (POC) 18.7 (L) 22 - 26 MMOL/L    sO2 (POC) 97 92 - 97 %    Base deficit (POC) 8 mmol/L    Allens test (POC) N/A      Total resp. rate 32      Site RIGHT RADIAL      Patient temp.  97.5      Specimen type (POC) ARTERIAL      Performed by [facility]    D DIMER    Collection Time: 07/07/19  4:00 AM   Result Value Ref Range    D DIMER 0.41 <0.46 ug/ml(FEU)   LACTIC ACID    Collection Time: 07/07/19  4:00 AM   Result Value Ref Range    Lactic acid 1.4 0.4 - 2.0 MMOL/L   CARDIAC PANEL,(CK, CKMB & TROPONIN)    Collection Time: 07/07/19  4:00 AM   Result Value Ref Range     39 - 308 U/L    CK - MB 7.0 (H) <3.6 ng/ml    CK-MB Index 6.9 (H) 0.0 - 4.0 %    Troponin-I, QT <0.02 0.0 - 0.045 NG/ML   CULTURE, BLOOD    Collection Time: 07/07/19  4:00 AM   Result Value Ref Range    Special Requests: NO SPECIAL REQUESTS      Culture result: NO GROWTH AFTER 2 HOURS     NT-PRO BNP    Collection Time: 07/07/19  4:00 AM   Result Value Ref Range    NT pro- 0 - 1,800 PG/ML   PROTHROMBIN TIME + INR    Collection Time: 07/07/19  4:00 AM   Result Value Ref Range    Prothrombin time 12.4 11.5 - 15.2 sec    INR 1.0 0.8 - 1.2     POC G3    Collection Time: 07/07/19  4:14 AM   Result Value Ref Range    Device: BIPAP      FIO2 (POC) 0.50 %    pH (POC) 7.284 (L) 7.35 - 7.45      pCO2 (POC) 45.5 (H) 35.0 - 45.0 MMHG    pO2 (POC) 218 (H) 80 - 100 MMHG    HCO3 (POC) 21.7 (L) 22 - 26 MMOL/L    sO2 (POC) 100 (H) 92 - 97 %    Base deficit (POC) 5 mmol/L    PEEP/CPAP (POC) 7 cmH2O    PIP (POC) 13      Pressure support 5 cmH2O    Allens test (POC) N/A      Total resp. rate 26      Site RIGHT RADIAL      Patient temp. 97.7      Specimen type (POC) ARTERIAL      Performed by Carmenza Agrawal    POC G3    Collection Time: 07/07/19  7:28 AM   Result Value Ref Range    Device: VENT      FIO2 (POC) 50 %    pH (POC) 7.252 (L) 7.35 - 7.45      pCO2 (POC) 44.3 35.0 - 45.0 MMHG    pO2 (POC) 101 (H) 80 - 100 MMHG    HCO3 (POC) 19.5 (L) 22 - 26 MMOL/L    sO2 (POC) 97 92 - 97 %    Base deficit (POC) 8 mmol/L    Mode ASSIST CONTROL      Set Rate 14 bpm    PEEP/CPAP (POC) 5.0 cmH2O    PIP (POC) 15      Allens test (POC) YES      Inspiratory Time 0.8 sec    Total resp. rate 26      Site LEFT RADIAL      Specimen type (POC) ARTERIAL      Performed by Arminda Montano     Pressure control YES             Recent Labs     07/07/19  0728 07/07/19  0414 07/07/19  0259   FIO2I 50 0.50 0.21   HCO3I 19.5* 21.7* 18.7*   PCO2I 44.3 45.5* 38.6   PHI 7.252* 7.284* 7.290*   PO2I 101* 218* 94     EKG  Diagnosis   Final   Sinus tachycardia   Otherwise normal ECG   When compared with ECG of 08-FEB-2019 12:15,   aberrant conduction is no longer present   T wave inversion no longer evident in Anterior leads   Confirmed by Ra Mcwilliams MD, -- ([id number]) on 7/6/2019 11:48:08 AM      Echo March 2017  SUMMARY:  Left ventricle: Systolic function was normal by visual assessment. Ejection fraction was estimated in the range of 55 % to 60 %. Poor  resolution of endocardial border. Doppler parameters were consistent with  abnormal left ventricular relaxation (grade 1 diastolic dysfunction). COMPARISONS:  Comparison was made with the previous study of 03-Oct-2014.       All Micro Results     Procedure Component Value Units Date/Time    CULTURE, BLOOD [[id number]] Collected:  07/07/19 0400    Order Status:  Completed Specimen:  Blood Updated:  07/07/19 0732     Special Requests: NO SPECIAL REQUESTS        Culture result: NO GROWTH AFTER 2 HOURS       CULTURE, BLOOD [[id number]] Collected:  07/06/19 0700    Order Status: Completed Specimen:  Whole Blood Updated:  07/07/19 0732     Special Requests: NO SPECIAL REQUESTS        Culture result: NO GROWTH 1 DAY       CULTURE, BLOOD [[id number]] Collected:  07/06/19 0742    Order Status:  Completed Specimen:  Whole Blood Updated:  07/07/19 0732     Special Requests: NO SPECIAL REQUESTS        Culture result: NO GROWTH AFTER 23 HOURS       CULTURE, BLOOD [[id number]] Collected:  07/07/19 0400    Order Status:  Canceled             Imaging:  [x]I have personally reviewed the patients chest radiographs images and report     Results from [place] encounter on 07/06/19   XR CHEST PORT    Narrative ---------------------------------------------------------------------------  <<<<<<<<<           [address] [id number] Radiology  [facility]           >>>>>>>>>   ---------------------------------------------------------------------------    CLINICAL HISTORY:  Endotracheal tube placement. Respiratory failure. COMPARISON EXAMINATIONS:  Previous of the same day. ---  SINGLE FRONTAL VIEW OF THE CHEST  ---    The cardiomediastinal silhouette is stable. There is a right lower lobe  infiltrate. There is blunting of the right costophrenic angle. The left lung is  clear. There is an endotracheal tube seen at the level of the inferior  clavicles. A gastric tube extends just below the diaphragm with side port above  the gastroesophageal junction. No significant osseous abnormalities are  identified.        --------------    Impression Impression:  --------------    Endotracheal tube in good position. Gastric tube extends just below the diaphragm with side port above the  gastroesophageal junction. Recommend advancing approximately 10 cm for better  positioning.     Right lower lobe infiltrate similar to previous       Results from Hospital Encounter encounter on 10/19/18   CT CHEST WO CONT    Narrative EXAM: CT chest     INDICATION: Dyspnea on exertion    COMPARISON: May 19, 2017    TECHNIQUE: Axial CT imaging from the thoracic inlet through the diaphragm  without intravenous contrast. Multiplanar reformats were generated. One or more  dose reduction techniques were used on this CT: automated exposure control,  adjustment of the mAs and/or kVp according to patient size, and iterative  reconstruction techniques. The specific techniques used on this CT exam have  been documented in the patient's electronic medical record.    _______________    FINDINGS:    LYMPH NODES: There are no enlarged lymph nodes. PLEURA: There is a complex fluid versus soft tissue density in the right pleural  space with peripheral calcification unchanged from prior exam. This may  represent chronic empyema or old calcified hematoma. There is another  pleural-based calcified lesion more anteriorly in the right hemithorax. Again  may represent chronic empyema or calcified hematoma. No gross interval change  appearance of these calcified lesions. No left pleural effusion seen. HEART: Normal in size without pericardial effusion. Significant calcific  coronary artery disease present. VASCULATURE/MEDIASTINUM: There is a small hiatal hernia. Mild calcific  atherosclerosis present. LUNGS: No suspicious nodule or mass. There is scarring at the right lung apex. No new airspace disease seen. Left lung is clear. AIRWAY: Patent    UPPER ABDOMEN: There is a right adrenal adenoma measuring 13 mm. Left adrenal is  unremarkable. There is a probable cyst the upper pole the right kidney measuring  2.4 cm. Visualized spleen, pancreas and liver are unremarkable. OTHER: No acute or aggressive osseous abnormalities identified. _______________      Impression IMPRESSION:    No acute process.     Chronic pleural findings on the right           IMPRESSION:     Principal Problem:    Acute respiratory failure with hypoxia     Pneumonia     COPD with acute exacerbation       Patient Active Problem List   Diagnosis Code    COPD [facility]ch[facility] obstructive [facility]) [facility] J44.9    Hypertension I10    Tobacco abuse Z72.0    COPD (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 ([facility]) J44.9    Chronic renal disease, stage 3, moderately decreased glomerular filtration rate between 30-59 mL/min/1.73 square meter ([facility]) N18.3    COPD exacerbation ([facility]) J44.1    Asbestosis ([address] J61    Acute exacerbation of chronic obstructive pulmonary disease (COPD) ([facility]) J44.1    Pneumonia J18.9    COPD with acute exacerbation ([facility]) J44.1    Acute respiratory failure with hypoxia ([facility]) J96.01           RECOMMENDATIONS:   · Pulm: Vent management; VCV mode - 16/450/40%/5; PIP 23; ABGs shows mild metabolic and respiratory acidosis  · CTA chest pending - due to acute decompensation, and eval for PEs  · Budesonide neb; Duonebs; IV steroids  · Sedation: propofol and versed drip currently; once adequately sedated, wean off versed drip; then use prn versed and fentanyl  · Sedation and Vent Bundles; RASS -1 to -2  · Cardiac: Trops neg; BP stable  · ID: send resp cxs; BS antibiotics - levaquin, zosyn and iv vanc; narrow Ab based on cxs and clinical/radiological improvement in next 24-48 hrs  · Renal: watch UOP and renal fn; patient being hydrated  · GI: NPO for now; PPI; consider TF from tomorrow  · Neuro: sedated  · Hem: watch Hb and Plts  · Endo: poc BG and SSI while on steroids  · Fluids: NS maintenance 100 /hr  · Nutrition:  OGT, NPO for now  · Proph:  DVt and GI proph - sc heparin and PPI   · Updated family when available - none at bedside  Will defer respective systems problem management to primary and other consultant and follow patient in ICU with primary and other medical team  Further recommendations will be based on the patient's response to recommended treatment and results of the investigation ordered. Quality Care: PPI, DVT prophylaxis, HOB elevated, Infection control all reviewed and addressed.   PAIN AND SEDATION: follow RASS protocol as above  · Skin/Wound: no active issues  · Nutrition: NPO for now  · Prophylaxis: DVT and GI Prophylaxis reviewed  · Restraints: none  · PT/OT eval and treat: as needed  · Lines/Tubes: PIVs; ET tube, OG tube, Humphreys cath - all 7/7/19  ADVANCE DIRECTIVE: Full Code    · Thank you for the consult      High complexity decision making was performed in this consultation and evaluation of this patient who is at high risk for decompensation with multiple organ involvement  Total critical care time spent rendering care exclusive of procedures: 40 minutes     Domo Schultz MD

## 2019-07-07 NOTE — PROGRESS NOTES
Pharmacy Dosing Services: Vancomycin    Consult for Vancomycin Dosing by Pharmacy by Dr. Davion Armstrong provided for this 74y.o. year old male , for indication of CAP (7 days). Day of Therapy 1    Ht Readings from Last 1 Encounters:   07/06/19 172.7 cm (68\")        Wt Readings from Last 1 Encounters:   07/06/19 83.9 kg (185 lb)      Other Current Antibiotics Levaquin + Zosyn    Significant Cultures Pending    Serum Creatinine Lab Results   Component Value Date/Time    Creatinine 1.35 (H) 07/07/2019 02:25 AM        Creatinine Clearance Estimated Creatinine Clearance: 49.9 mL/min (A) (based on SCr of 1.35 mg/dL (H)). BUN Lab Results   Component Value Date/Time    BUN 25 (H) 07/07/2019 02:25 AM        WBC Lab Results   Component Value Date/Time    WBC 7.4 07/07/2019 02:25 AM        H/H Lab Results   Component Value Date/Time    HGB 11.8 (L) 07/07/2019 02:25 AM        Platelets Lab Results   Component Value Date/Time    PLATELET 327 33/87/7152 02:25 AM        Temp 97.5 °F (36.4 °C)     Start Vancomycin therapy, with loading dose of 1500 mg at 0400 7/7/19. Follow with maintenance dose of 1250 mg at 2200 7/7/19, every 18 hours. Dose calculated to approximate a therapeutic trough of 15-20 mcg/mL. Pharmacy to follow daily and will make changes to dose and/or frequency based on clinical status.     Pharmacist [facility] Dr Joie Heimlich

## 2019-07-07 NOTE — PROGRESS NOTES
Attempted to call wife but no answer. Will keep trying to update her. Pt requiring propofol boluses and versed boluses for sedation so will start versed gtt. Levophed ordered for hypotension due to sedation.

## 2019-07-07 NOTE — ANESTHESIA PROCEDURE NOTES
Emergent Intubation  Performed by: Evelin King CRNA  Authorized by: Evelin King CRNA     Emergent Intubation:   Location:  ICU  Date/Time:  7/7/2019 4:53 AM  Indications:  Impending respiratory failure  Spontaneous Ventilation: present (Resp rate 35 and labored)    Level of Consciousness: awake  Preoxygenated: Yes      Airway Documentation:   Airway:  ETT - Cuffed (ROSARIO)  Technique:  Intubating stylet  Advanced Technique:  Meadows  Insertion Site:  Oral  Blade Size:  3  ETT size (mm):  7.5  ETT Line Gustavo:  Lips  ETT Insertion depth (cm):  23  Placement verified by: auscultation, EtCO2 and BBS    Attempts:  1  Difficult airway: No    Called to ICU to intubate Mr Amando Harding who has was recently admitted with exacerbation of COPD. He was tachypneic and wearing BiPAP  . Dr Brigitte Pettit at bedside. Explained intubation and risks briefly to pt and he nodded head in understanding. He had difficulty saying anything other than words due to acute SOB. Pre O2 with 100%, medicated with Propofol 150mg total, and trachea intubated as above. Tolerated well. PCXR taken and ETT pulled back to 22cm. with RT. Vent management per Dr Brigitte Pettit.

## 2019-07-07 NOTE — PROGRESS NOTES
TRANSFER NOTE TO ICU     75 y/o male admitted for COPD exacerbation with acute worsening tonight around 3 am when RT arrived for his breathing treatment. Concerned for impending respiratory failure. Will transfer to ICU for closer monitoring and observation and place on BiPAP. May require intubation. Neuro-no issues. Pulm-impending hypoxemic respiratory failure, RLL infiltrate. On broad spectrum abx, will add vanc. Can get CTA chest once able to lie flat. Ddimer pending. CV-Tachycardic, hypertensive due to respiratory distress. Will check troponin. GI-NPO, no issues. Renal-mild URIEL, will hydrate. ID-RLL infiltrate on CXR. Broad spectrum coverage for sepsis. Heme-No issues. Derm-No issues. F/E/N-Bolus IVF, replete prn, NPO in case of intubation.     Full code    Ppx-protonix, Mel Calvillo MD  Nocturnist

## 2019-07-07 NOTE — PROGRESS NOTES
Zosyn (Piperacillin/Tazobactam) Extended Infusion    Nichole Benson, a 74 y.o. yo male, has been converted to an extended infusion of Zosyn while in the intensive care unit. Extended infusions will run over 4 hours (240 minutes).     Order changed to Zosyn 3.375gm IV q8h    Recent Labs     07/07/19 0225 07/06/19  0546   CREA 1.35* 1.17     Ht Readings from Last 1 Encounters:   07/06/19 172.7 cm (68\")       Wt Readings from Last 1 Encounters:   07/06/19 83.9 kg (185 lb)       CrCl : Serum creatinine: 1.35 mg/dL (H) 07/07/19 0225  Estimated creatinine clearance: 49.9 mL/min (A)

## 2019-07-07 NOTE — PROGRESS NOTES
Pt continuing to have increased RR and distress even on bipap. Advised pt he will require intubation. Will then get CT/PE scan. Anesthesia at bedside to intubate, Dr. Lexie Garcia aware.     Celina Musa MD  Nocturnist

## 2019-07-07 NOTE — PROGRESS NOTES
07/07/19 0336   CPAP/BIPAP   CPAP/BIPAP Start/Stop On   Device Mode BIPAP   $$ Bipap Daily Yes   [employer] ID # [id number]   Mask Type and Size Full face; Medium   Skin Condition INTACT   PIP Observed 13 cm H20   IPAP (cm H2O) 12 cm H2O   EPAP (cm H2O) 7 cm H2O   Inspiratory Time (sec) 0.9 seconds   Vt Spont (ml) 637 ml   Ve Observed (l/min) 24.3 l/min   Backup Rate 8   Total RR (Spontaneous) 26 breaths per minute   Insp Rise Time (sec) 0.95   Leak (Estimated) 24 L/min   Pt's Home Machine No   Settings Verified Yes     Patient transferred to ICU d/t increased WOB and placed on bipap. Suction set up at the bedside.

## 2019-07-08 ENCOUNTER — APPOINTMENT (OUTPATIENT)
Dept: GENERAL RADIOLOGY | Age: 74
DRG: 208 | End: 2019-07-08
Attending: INTERNAL MEDICINE
Payer: MEDICARE

## 2019-07-08 PROBLEM — Z71.89 ADVANCED CARE PLANNING/COUNSELING DISCUSSION: Status: ACTIVE | Noted: 2019-07-08

## 2019-07-08 PROBLEM — R53.81 DEBILITY: Status: ACTIVE | Noted: 2019-07-08

## 2019-07-08 LAB
ANION GAP SERPL CALC-SCNC: 9 MMOL/L (ref 3–18)
ARTERIAL PATENCY WRIST A: ABNORMAL
ARTERIAL PATENCY WRIST A: YES
BASE DEFICIT BLD-SCNC: 5 MMOL/L
BASE DEFICIT BLD-SCNC: 6 MMOL/L
BDY SITE: ABNORMAL
BDY SITE: ABNORMAL
BODY TEMPERATURE: 97.5
BODY TEMPERATURE: 98.6
BUN SERPL-MCNC: 22 MG/DL (ref 7–18)
BUN/CREAT SERPL: 20 (ref 12–20)
CALCIUM SERPL-MCNC: 8.2 MG/DL (ref 8.5–10.1)
CHLORIDE SERPL-SCNC: 106 MMOL/L (ref 100–108)
CO2 SERPL-SCNC: 22 MMOL/L (ref 21–32)
CREAT SERPL-MCNC: 1.11 MG/DL (ref 0.6–1.3)
ERYTHROCYTE [DISTWIDTH] IN BLOOD BY AUTOMATED COUNT: 13.9 % (ref 11.6–14.5)
GAS FLOW.O2 O2 DELIVERY SYS: ABNORMAL L/MIN
GAS FLOW.O2 O2 DELIVERY SYS: ABNORMAL L/MIN
GAS FLOW.O2 SETTING OXYMISER: 16 BPM
GLUCOSE BLD STRIP.AUTO-MCNC: 118 MG/DL (ref 70–110)
GLUCOSE BLD STRIP.AUTO-MCNC: 138 MG/DL (ref 70–110)
GLUCOSE BLD STRIP.AUTO-MCNC: 145 MG/DL (ref 70–110)
GLUCOSE BLD STRIP.AUTO-MCNC: 148 MG/DL (ref 70–110)
GLUCOSE SERPL-MCNC: 148 MG/DL (ref 74–99)
HCO3 BLD-SCNC: 19.2 MMOL/L (ref 22–26)
HCO3 BLD-SCNC: 20.9 MMOL/L (ref 22–26)
HCT VFR BLD AUTO: 33.2 % (ref 36–48)
HGB BLD-MCNC: 11.2 G/DL (ref 13–16)
INR PPP: 0.9 (ref 0.8–1.2)
INSPIRATION.DURATION SETTING TIME VENT: 0.9 SEC
MAGNESIUM SERPL-MCNC: 2.1 MG/DL (ref 1.6–2.6)
MCH RBC QN AUTO: 27.8 PG (ref 24–34)
MCHC RBC AUTO-ENTMCNC: 33.7 G/DL (ref 31–37)
MCV RBC AUTO: 82.4 FL (ref 74–97)
O2/TOTAL GAS SETTING VFR VENT: 0.3 %
O2/TOTAL GAS SETTING VFR VENT: 30 %
PCO2 BLD: 33.8 MMHG (ref 35–45)
PCO2 BLD: 36.3 MMHG (ref 35–45)
PEEP RESPIRATORY: 5 CMH2O
PEEP RESPIRATORY: 5 CMH2O
PH BLD: 7.36 [PH] (ref 7.35–7.45)
PH BLD: 7.37 [PH] (ref 7.35–7.45)
PIP ISTAT,IPIP: 20
PLATELET # BLD AUTO: 246 K/UL (ref 135–420)
PMV BLD AUTO: 8.4 FL (ref 9.2–11.8)
PO2 BLD: 102 MMHG (ref 80–100)
PO2 BLD: 85 MMHG (ref 80–100)
POTASSIUM SERPL-SCNC: 3.1 MMOL/L (ref 3.5–5.5)
POTASSIUM SERPL-SCNC: 3.5 MMOL/L (ref 3.5–5.5)
PRESSURE SUPPORT SETTING VENT: 7 CMH2O
PROTHROMBIN TIME: 12.2 SEC (ref 11.5–15.2)
RBC # BLD AUTO: 4.03 M/UL (ref 4.7–5.5)
SAO2 % BLD: 96 % (ref 92–97)
SAO2 % BLD: 98 % (ref 92–97)
SERVICE CMNT-IMP: ABNORMAL
SERVICE CMNT-IMP: ABNORMAL
SODIUM SERPL-SCNC: 137 MMOL/L (ref 136–145)
SPECIMEN TYPE: ABNORMAL
SPECIMEN TYPE: ABNORMAL
TOTAL RESP. RATE, ITRR: 16
TOTAL RESP. RATE, ITRR: 29
VENTILATION MODE VENT: ABNORMAL
VENTILATION MODE VENT: ABNORMAL
VOLUME CONTROL PLUS IVLCP: YES
VT SETTING VENT: 450 ML
WBC # BLD AUTO: 9.9 K/UL (ref 4.6–13.2)

## 2019-07-08 PROCEDURE — 83735 ASSAY OF MAGNESIUM: CPT

## 2019-07-08 PROCEDURE — 74011000250 HC RX REV CODE- 250: Performed by: INTERNAL MEDICINE

## 2019-07-08 PROCEDURE — 76450000000

## 2019-07-08 PROCEDURE — 77010033678 HC OXYGEN DAILY

## 2019-07-08 PROCEDURE — 82803 BLOOD GASES ANY COMBINATION: CPT

## 2019-07-08 PROCEDURE — 74011000250 HC RX REV CODE- 250: Performed by: FAMILY MEDICINE

## 2019-07-08 PROCEDURE — 74011250636 HC RX REV CODE- 250/636: Performed by: INTERNAL MEDICINE

## 2019-07-08 PROCEDURE — 84132 ASSAY OF SERUM POTASSIUM: CPT

## 2019-07-08 PROCEDURE — 74011250637 HC RX REV CODE- 250/637: Performed by: INTERNAL MEDICINE

## 2019-07-08 PROCEDURE — 74011250636 HC RX REV CODE- 250/636: Performed by: FAMILY MEDICINE

## 2019-07-08 PROCEDURE — C9113 INJ PANTOPRAZOLE SODIUM, VIA: HCPCS | Performed by: FAMILY MEDICINE

## 2019-07-08 PROCEDURE — 71045 X-RAY EXAM CHEST 1 VIEW: CPT

## 2019-07-08 PROCEDURE — 74011000258 HC RX REV CODE- 258: Performed by: FAMILY MEDICINE

## 2019-07-08 PROCEDURE — 36415 COLL VENOUS BLD VENIPUNCTURE: CPT

## 2019-07-08 PROCEDURE — 65610000006 HC RM INTENSIVE CARE

## 2019-07-08 PROCEDURE — 85027 COMPLETE CBC AUTOMATED: CPT

## 2019-07-08 PROCEDURE — 92610 EVALUATE SWALLOWING FUNCTION: CPT

## 2019-07-08 PROCEDURE — 85610 PROTHROMBIN TIME: CPT

## 2019-07-08 PROCEDURE — 94640 AIRWAY INHALATION TREATMENT: CPT

## 2019-07-08 PROCEDURE — 92526 ORAL FUNCTION THERAPY: CPT

## 2019-07-08 PROCEDURE — 82962 GLUCOSE BLOOD TEST: CPT

## 2019-07-08 PROCEDURE — 94003 VENT MGMT INPAT SUBQ DAY: CPT

## 2019-07-08 PROCEDURE — 74011250637 HC RX REV CODE- 250/637: Performed by: FAMILY MEDICINE

## 2019-07-08 PROCEDURE — 36600 WITHDRAWAL OF ARTERIAL BLOOD: CPT

## 2019-07-08 RX ORDER — POLYVINYL ALCOHOL 14 MG/ML
1 SOLUTION/ DROPS OPHTHALMIC AS NEEDED
Status: DISCONTINUED | OUTPATIENT
Start: 2019-07-08 | End: 2019-07-12 | Stop reason: HOSPADM

## 2019-07-08 RX ORDER — POTASSIUM CHLORIDE 7.45 MG/ML
10 INJECTION INTRAVENOUS
Status: COMPLETED | OUTPATIENT
Start: 2019-07-08 | End: 2019-07-08

## 2019-07-08 RX ORDER — POTASSIUM CHLORIDE 20 MEQ/1
40 TABLET, EXTENDED RELEASE ORAL
Status: COMPLETED | OUTPATIENT
Start: 2019-07-08 | End: 2019-07-08

## 2019-07-08 RX ORDER — FUROSEMIDE 10 MG/ML
20 INJECTION INTRAMUSCULAR; INTRAVENOUS ONCE
Status: COMPLETED | OUTPATIENT
Start: 2019-07-08 | End: 2019-07-08

## 2019-07-08 RX ADMIN — POLYVINYL ALCOHOL 1 DROP: 14 SOLUTION/ DROPS OPHTHALMIC at 19:17

## 2019-07-08 RX ADMIN — POTASSIUM CHLORIDE 40 MEQ: 20 TABLET, EXTENDED RELEASE ORAL at 20:24

## 2019-07-08 RX ADMIN — IPRATROPIUM BROMIDE AND ALBUTEROL SULFATE 3 ML: .5; 3 SOLUTION RESPIRATORY (INHALATION) at 15:45

## 2019-07-08 RX ADMIN — IPRATROPIUM BROMIDE AND ALBUTEROL SULFATE 3 ML: .5; 3 SOLUTION RESPIRATORY (INHALATION) at 07:27

## 2019-07-08 RX ADMIN — METHYLPREDNISOLONE SODIUM SUCCINATE 60 MG: 125 INJECTION, POWDER, FOR SOLUTION INTRAMUSCULAR; INTRAVENOUS at 00:39

## 2019-07-08 RX ADMIN — PROPOFOL 50 MCG/KG/MIN: 10 INJECTION, EMULSION INTRAVENOUS at 03:37

## 2019-07-08 RX ADMIN — PIPERACILLIN SODIUM,TAZOBACTAM SODIUM 3.38 G: 3; .375 INJECTION, POWDER, FOR SOLUTION INTRAVENOUS at 00:39

## 2019-07-08 RX ADMIN — POTASSIUM CHLORIDE 10 MEQ: 10 INJECTION, SOLUTION INTRAVENOUS at 10:14

## 2019-07-08 RX ADMIN — IPRATROPIUM BROMIDE AND ALBUTEROL SULFATE 3 ML: .5; 3 SOLUTION RESPIRATORY (INHALATION) at 04:59

## 2019-07-08 RX ADMIN — SODIUM CHLORIDE 40 MG: 9 INJECTION INTRAMUSCULAR; INTRAVENOUS; SUBCUTANEOUS at 09:06

## 2019-07-08 RX ADMIN — METHYLPREDNISOLONE SODIUM SUCCINATE 60 MG: 125 INJECTION, POWDER, FOR SOLUTION INTRAMUSCULAR; INTRAVENOUS at 12:08

## 2019-07-08 RX ADMIN — VANCOMYCIN HYDROCHLORIDE 1250 MG: 10 INJECTION, POWDER, LYOPHILIZED, FOR SOLUTION INTRAVENOUS at 00:39

## 2019-07-08 RX ADMIN — HEPARIN SODIUM 5000 UNITS: 5000 INJECTION INTRAVENOUS; SUBCUTANEOUS at 00:39

## 2019-07-08 RX ADMIN — HEPARIN SODIUM 5000 UNITS: 5000 INJECTION INTRAVENOUS; SUBCUTANEOUS at 09:16

## 2019-07-08 RX ADMIN — METHYLPREDNISOLONE SODIUM SUCCINATE 60 MG: 125 INJECTION, POWDER, FOR SOLUTION INTRAMUSCULAR; INTRAVENOUS at 18:22

## 2019-07-08 RX ADMIN — POTASSIUM CHLORIDE 10 MEQ: 10 INJECTION, SOLUTION INTRAVENOUS at 07:06

## 2019-07-08 RX ADMIN — HEPARIN SODIUM 5000 UNITS: 5000 INJECTION INTRAVENOUS; SUBCUTANEOUS at 16:23

## 2019-07-08 RX ADMIN — METHYLPREDNISOLONE SODIUM SUCCINATE 60 MG: 125 INJECTION, POWDER, FOR SOLUTION INTRAMUSCULAR; INTRAVENOUS at 06:14

## 2019-07-08 RX ADMIN — VANCOMYCIN HYDROCHLORIDE 1250 MG: 10 INJECTION, POWDER, LYOPHILIZED, FOR SOLUTION INTRAVENOUS at 16:23

## 2019-07-08 RX ADMIN — ACETAMINOPHEN 650 MG: 325 TABLET ORAL at 16:35

## 2019-07-08 RX ADMIN — POLYVINYL ALCOHOL 1 DROP: 14 SOLUTION/ DROPS OPHTHALMIC at 20:28

## 2019-07-08 RX ADMIN — POTASSIUM CHLORIDE 10 MEQ: 10 INJECTION, SOLUTION INTRAVENOUS at 07:42

## 2019-07-08 RX ADMIN — CHLORHEXIDINE GLUCONATE 10 ML: 1.2 RINSE ORAL at 09:16

## 2019-07-08 RX ADMIN — POTASSIUM CHLORIDE 10 MEQ: 10 INJECTION, SOLUTION INTRAVENOUS at 13:44

## 2019-07-08 RX ADMIN — IPRATROPIUM BROMIDE AND ALBUTEROL SULFATE 3 ML: .5; 3 SOLUTION RESPIRATORY (INHALATION) at 00:23

## 2019-07-08 RX ADMIN — FUROSEMIDE 20 MG: 10 INJECTION, SOLUTION INTRAMUSCULAR; INTRAVENOUS at 09:06

## 2019-07-08 RX ADMIN — POTASSIUM CHLORIDE 10 MEQ: 10 INJECTION, SOLUTION INTRAVENOUS at 12:08

## 2019-07-08 RX ADMIN — PIPERACILLIN SODIUM,TAZOBACTAM SODIUM 3.38 G: 3; .375 INJECTION, POWDER, FOR SOLUTION INTRAVENOUS at 07:42

## 2019-07-08 RX ADMIN — LEVOFLOXACIN 750 MG: 5 INJECTION, SOLUTION INTRAVENOUS at 06:14

## 2019-07-08 RX ADMIN — IPRATROPIUM BROMIDE AND ALBUTEROL SULFATE 3 ML: .5; 3 SOLUTION RESPIRATORY (INHALATION) at 20:09

## 2019-07-08 RX ADMIN — BUDESONIDE 500 MCG: 0.5 INHALANT RESPIRATORY (INHALATION) at 07:27

## 2019-07-08 RX ADMIN — MIDAZOLAM HYDROCHLORIDE 5 MG/HR: 5 INJECTION, SOLUTION INTRAMUSCULAR; INTRAVENOUS at 02:59

## 2019-07-08 RX ADMIN — PIPERACILLIN SODIUM,TAZOBACTAM SODIUM 3.38 G: 3; .375 INJECTION, POWDER, FOR SOLUTION INTRAVENOUS at 16:23

## 2019-07-08 RX ADMIN — IPRATROPIUM BROMIDE AND ALBUTEROL SULFATE 3 ML: .5; 3 SOLUTION RESPIRATORY (INHALATION) at 11:08

## 2019-07-08 RX ADMIN — POLYVINYL ALCOHOL 1 DROP: 14 SOLUTION/ DROPS OPHTHALMIC at 11:29

## 2019-07-08 RX ADMIN — POTASSIUM CHLORIDE 10 MEQ: 10 INJECTION, SOLUTION INTRAVENOUS at 11:09

## 2019-07-08 RX ADMIN — PROPOFOL 40 MCG/KG/MIN: 10 INJECTION, EMULSION INTRAVENOUS at 07:05

## 2019-07-08 RX ADMIN — BUDESONIDE 500 MCG: 0.5 INHALANT RESPIRATORY (INHALATION) at 20:09

## 2019-07-08 NOTE — PROGRESS NOTES
Speech Therapy Note:    SLP orders received however, patient >6 hours s/p extubation. SLP will f/u later this day. D/w RN, Samanta Sebastian.  Thank you for this referral.     Awilda Mckeon M.S., [address] [facility]  Speech Language Pathologist

## 2019-07-08 NOTE — PROGRESS NOTES
[address] [phone] (COPE)    Patient Name: Geovanni Morris  YOB: 1945    Reason for Consult: goals of care  Requesting Provider: Dr. Prince Zambrano   Primary Care Physician: Catherine Neri MD     SUMMARY:   Geovanni Morris is a 74 y.o. with a past history of COPD, Asbestosis, HTN, , who was admitted on 2019 from home with a diagnosis of COPD exacerbation. Current medical issues leading to Palliative Medicine involvement include: Advanced disease. Palliative medicine consult noted and appreciated. PM team members, Ru Noel NP, Nohemi Lee MSW and myself saw Mr. Jacinta Hsu in ICU. He was alert and oriented and very pleasant. He denied pain but admitted to feeling some shortness of breath. Noted O2 at 2 L via NC. He stated \"I almost  last night but you saved me\". He stated \"I have never been on that breathing machine before. He would become tearful and very anxious easily and would stop talking to calm himself down. Wife and two granddaughters were at bedside. Wife seemed very emotional as well. Support provided to patient and family. We did not go into full goals of care discussion as patient and wife were very emotional. PM team will return tomorrow. Hopefully both will be calmer and more able to hold detailed discussion to establish goals of care. RECOMMENDATIONS:   1. Continue current care   2. Continue goals of care discussion      TREATMENT PREFERENCES:   Code Status: Full Code      Activity (Movement): Lying quietly, normal position     PSYCHOSOCIAL/SPIRITUAL ASSESSMENT:   Palliative IDT has assessed this patient for cultural preferences / practices and a referral made as appropriate to needs (Cultural Services, Patient Advocacy, Ethics, etc.)  , lives with wife,  10 years to current wife. Has 5 children. One from a previous relationship that he has no contact with.      Any spiritual / Islam concerns:  [] Yes /  [x] No    Caregiver Burnout:  [x] Yes /  [] No /  [] No Caregiver Present

## 2019-07-08 NOTE — PROGRESS NOTES
conducted a Follow up consultation and Spiritual Assessment for Gautam Saenz, who is a 74 y. o.,male. Patient was anxious \"I can't get an MRI. I'm claustrophobic. \" I asked if he was scheduled for one and he didn't know. His wife was at the bedside. She was teary as he asked the same question a few times. Patient and wife each have children from previous relationships. One of patient's daughters were at the bedside later. All of his children - 2 daughters and 2 sons - were present yesterday. Continued the relationship of care and support. Listened empathically. Offered prayer and assurance of continued prayer on patients behalf. Chart reviewed. Patient expressed gratitude for Spiritual Care visit. Patient was reviewed in ICU Interdisciplinary Rounds. Chaplains will continue to follow and will provide pastoral care as needed or requested.  recommends bedside caregivers page the  on duty if patient shows signs of acute spiritual or emotional distress. [address] [place] [address].    Board Certified   [phone] - Office

## 2019-07-08 NOTE — CONSULTS
[facility]: [phone] ([phone])  [facility]: [phone]   [facility]: [phone]    Patient Name: Christal Morillo  YOB: 1945    Date of Initial Consult: 7/8/2019   Reason for Consult: care decisions   Requesting Provider: Dr Hermila Cortes  Primary Care Physician: Rosmery Hernandez MD      SUMMARY:   Christal Morillo is a 74y.o. year old with a past history of prostate cancer, COPD, Asbestosis, hypertension, pneumonia  who was admitted on 7/6/2019 from home with a diagnosis of shortness of breath. Found to have RLL pneumonia and COPD acute exacerbation. He worsened over night, placed on BIPAP but failed and ultimately required oral intubation. He was able to be successfully extubated earlier today. Current medical issues leading to Palliative Medicine involvement include: 74year old male with COPD, asbestos, pleural disease who required intubation for respiratory support. Palliative medicine is consulted for support and goals of care discussion. PALLIATIVE DIAGNOSES:   1. Advanced care plan discussion   2. COPD   3. Shortness of breath / acute respiratory failure   4. Debility        PLAN:   1. Advanced care plan discussion patient has reportedly competed an AMD but not in our system. Seen today along with TAMI Bridges and Ms Master's RN. We saw post extubation. He is alert and oriented, anxious but very pleasant gentleman. His wife and 2 grand children at bedside. . Patient is tearful saying he is worse this time \" I am afraid I will not make it\" Offered support to patient and wife who was also tearful. We did not discuss goals of care with Mr Juvenal Newton today as he is very anxious at time of our visit. We did speak with his wife and family who agreed with waiting until tomorrow and patient a bit more relaxed and less tearful. Current goals of care full code with full interventions.    2. COPD former tobacco user, not on chronic O2 at home. Patient reports he has been getting more short breath at home and it is very distressing to him. He feels his disease is progressing. 3. Shortness of breath maintaining good O2 sats on 2 liters post extubation. He tells us he currently does not feel short of breath. Prior to this event he has never been intubated   4. Debility enjoys foot ball. Tells us he has frequent hospitalizations but did not see in system, but feels his disease is progressive interfering with his ADL's. His wife tells us he asks to come the hospital about 3x week because of SOB. 5. Initial consult note routed to primary continuity provider  6. Communicated plan of care with: Palliative IDT, RN, patient, wife, grand daughters             TREATMENT PREFERENCES:   Code Status: Full Code    Advance Care Planning:  [] [facility] Interdisciplinary Team has updated the ACP Navigator with [address] and Patient Capacity    Primary Decision Maker ([address]): legal medical surrogate decision maker is his wife    Medical Interventions: Limited additional interventions        Other:  As far as possible, the palliative care team has discussed with patient / health care proxy about goals of care / treatment preferences for patient. HISTORY:     History obtained from:  Patient and chart     CHIEF COMPLAINT: shortness of breath     HPI/SUBJECTIVE:    The patient is:   [x] Verbal and participatory  [] Non-participatory due to:   78year old male who presented to the ER with shortness of breath. Past medical history as above. He worsened overnight requiring intubation. He was able to be successfully extubated to nasal cannula. Goals of care not discussed today as patient very anxious.      Clinical Pain Assessment (nonverbal scale for nonverbal patients): Clinical Pain Assessment  Severity: 0     Activity (Movement): Lying quietly, normal position    Duration: for how long has pt been experiencing pain (e.g., 2 days, 1 month, years)  Frequency: how often pain is an issue (e.g., several times per day, once every few days, constant)     FUNCTIONAL ASSESSMENT:     Palliative Performance Scale (PPS): 50       ECOG  ECOG Status : Limited self-care     PSYCHOSOCIAL/SPIRITUAL SCREENING:      Any spiritual / Anabaptist concerns:  [] Yes /  [x] No    Caregiver Burnout:  [x] Yes /  [] No /  [] No Caregiver Present      Anticipatory grief assessment:   [x] Normal  / [] Maladaptive        REVIEW OF SYSTEMS:     Positive and pertinent negative findings in ROS are noted above in HPI. The following systems were [x] reviewed / [] unable to be reviewed as noted in HPI  Other findings are noted below. Systems: constitutional, ears/nose/mouth/throat, respiratory, gastrointestinal, genitourinary, musculoskeletal, integumentary, neurologic, psychiatric, endocrine. Positive findings noted below. Modified ESAS Completed by: provider   Fatigue: 2 Drowsiness: 0     Pain: 0   Anxiety: 7 Nausea: 0     Dyspnea: 0           Stool Occurrence(s): 0        PHYSICAL EXAM:     Wt Readings from Last 3 Encounters:   07/06/19 83.9 kg (185 lb)   02/09/19 91.4 kg (201 lb 8 oz)   11/23/18 93 kg (205 lb)     Blood pressure 148/81, pulse 90, temperature 98.1 °F (36.7 °C), resp. rate 25, height 5' 8\" (1.727 m), weight 83.9 kg (185 lb), SpO2 98 %.   Pain:  Pain Scale 1: FLACC  Pain Intensity 1: 0              Pain Intervention(s) 1: Medication (see MAR), MD notified (comment), Relaxation technique, Therapeutic presence, Rest  Last bowel movement: none recorded    Constitutional: well developed male who is reclining in bed, anxious and at times tearful but in NAD  Eyes: pupils equal, anicteric  ENMT: no nasal discharge, moist mucous membranes  Respiratory: breathing not currently labored on nasal cannula   Skin: warm, dry  Neurologic: alert oriented x 3   Psychiatric: full affect, no hallucinations         HISTORY:     Principal Problem:    Pneumonia (7/6/2019)    Active Problems:    COPD with acute exacerbation ([facility] (7/6/2019)      Acute respiratory failure with hypoxia ([facility] (7/7/2019)      Past Medical History:   Diagnosis Date    Cancer [facility])     prostate    COPD (chronic obstructive pulmonary disease) ([facility]     Hypertension     Pneumonia     Radiation effect       Past Surgical History:   Procedure Laterality Date    HX HERNIA REPAIR        Family History   Problem Relation Age of Onset    Heart Disease Mother      History reviewed, no pertinent family history.   Social History     Tobacco Use    Smoking status: Former Smoker     Types: Cigarettes    Smokeless tobacco: Never Used   Substance Use Topics    Alcohol use: No     Allergies   Allergen Reactions    Aspirin Other (comments)     \"messes my stomach up\"      Current Facility-Administered Medications   Medication Dose Route Frequency    ELECTROLYTE REPLACEMENT PROTOCOL - Potassium Standard Dosing   1 Each Other PRN    ELECTROLYTE REPLACEMENT PROTOCOL - Magnesium   1 Each Other PRN    polyvinyl alcohol (LIQUIFILM TEARS) 1.4 % ophthalmic solution 1 Drop  1 Drop Both Eyes PRN    Vancomycin- Pharmacy to dose  1 Each Other Rx Dosing/Monitoring    vancomycin (VANCOCIN) 1250 mg in  ml infusion  1,250 mg IntraVENous Q18H    propofol (DIPRIVAN) infusion  0-50 mcg/kg/min IntraVENous TITRATE    midazolam in normal saline (VERSED) 2 mg/mL infusion  1-5 mg/hr IntraVENous TITRATE    budesonide (PULMICORT) 500 mcg/2 ml nebulizer suspension  500 mcg Nebulization BID RT    albuterol-ipratropium (DUO-NEB) 2.5 MG-0.5 MG/3 ML  3 mL Nebulization Q4H RT    midazolam (PF) (VERSED) injection 1-2 mg  1-2 mg IntraVENous Q2H PRN    chlorhexidine (PERIDEX) 0.12 % mouthwash 10 mL  10 mL Oral Q12H    methylPREDNISolone (PF) (Solu-MEDROL) injection 60 mg  60 mg IntraVENous Q6H    glucose chewable tablet 16 g  4 Tab Oral PRN    glucagon (GLUCAGEN) injection 1 mg  1 mg IntraMUSCular PRN    dextrose 10% infusion 100 mL  100 mL IntraVENous PRN    fentaNYL citrate (PF) injection 50 mcg  50 mcg IntraVENous Q2H PRN    piperacillin-tazobactam (ZOSYN) 3.375 g in 0.9% sodium chloride (MBP/ADV) 100 mL MBP  3.375 g IntraVENous Q8H    insulin lispro (HUMALOG) injection   SubCUTAneous Q6H    levoFLOXacin (LEVAQUIN) 750 mg in D5W IVPB  750 mg IntraVENous Q24H    heparin (porcine) injection 5,000 Units  5,000 Units SubCUTAneous Q8H    acetaminophen (TYLENOL) tablet 650 mg  650 mg Oral Q8H    doxazosin (CARDURA) tablet 1 mg  1 mg Oral DAILY    pantoprazole (PROTONIX) 40 mg in sodium chloride 0.9% 10 mL injection  40 mg IntraVENous DAILY    albuterol (PROVENTIL VENTOLIN) nebulizer solution 2.5 mg  2.5 mg Nebulization Q4H PRN        LAB AND IMAGING FINDINGS:     Lab Results   Component Value Date/Time    WBC 9.9 07/08/2019 03:20 AM    HGB 11.2 (L) 07/08/2019 03:20 AM    PLATELET 793 99/03/8959 03:20 AM     Lab Results   Component Value Date/Time    Sodium 137 07/08/2019 03:20 AM    Potassium 3.1 (L) 07/08/2019 03:20 AM    Chloride 106 07/08/2019 03:20 AM    CO2 22 07/08/2019 03:20 AM    BUN 22 (H) 07/08/2019 03:20 AM    Creatinine 1.11 07/08/2019 03:20 AM    Calcium 8.2 (L) 07/08/2019 03:20 AM    Magnesium 2.1 07/08/2019 03:20 AM    Phosphorus 4.2 02/08/2019 08:41 PM      Lab Results   Component Value Date/Time    AST (SGOT) 12 (L) 07/06/2019 05:46 AM    Alk.  phosphatase 87 07/06/2019 05:46 AM    Protein, total 6.1 (L) 07/06/2019 05:46 AM    Albumin 3.2 (L) 07/06/2019 05:46 AM    Globulin 2.9 07/06/2019 05:46 AM     Lab Results   Component Value Date/Time    INR 0.9 07/08/2019 03:20 AM    Prothrombin time 12.2 07/08/2019 03:20 AM    aPTT 30.5 10/02/2014 01:32 AM      No results found for: IRON, FE, TIBC, IBCT, PSAT, FERR   No results found for: PH, PCO2, PO2  No components found for: [place]   Lab Results   Component Value Date/Time     07/07/2019 04:00 AM    CK - MB 7.0 (H) 07/07/2019 04:00 AM              Total time: 30 minutes Counseling / coordination time, spent as noted above: 20 minutes   > 50% counseling / coordination: yes with family, patient, RN     Prolonged service was provided for  []30 min   []75 min in face to face time in the presence of the patient, spent as noted above. Time Start:   Time End:   Note: this can only be billed with 43778 (initial) or 26854 (follow up). If multiple start / stop times, list each separately.

## 2019-07-08 NOTE — PROGRESS NOTES
Bedside and Verbal shift change report received from PAUL Negro RN (offgoing nurse). Report included the following information SBAR, Kardex, Intake/Output, MAR and Recent Results. 2100 Shift assessment completed, see EMR    0030 Reassessment completed, see EMR    0430 Reassessment completed, see EMR     0615 CHG bath completed with full linen change. Elena Venegas Notified Dr. Ernesto Cho of patient's K level of 3.1. New order given for electrolyte protocol for K and Mg. Bedside and Verbal shift change report given to PAUL Love RN (oncoming nurse) . Report included the following information SBAR, Kardex, Intake/Output, MAR and Recent Results.

## 2019-07-08 NOTE — PROGRESS NOTES
Problem: Dysphagia (Adult)  Goal: *Acute Goals and Plan of Care (Insert Text)  Description  Recommendations:  Diet: Soft solid/thin liquid   Meds: Per patient preference  Aspiration Precautions  Oral Care TID    Goals:  Patient will:  1. Tolerate PO trials with 0 s/s overt distress in 4/5 trials  2. Utilize compensatory swallow strategies/maneuvers (decrease bite/sip, size/rate, alt. liq/sol) with min cues in 4/5 trials  3. Perform oral-motor/laryngeal exercises to increase oropharyngeal swallow function with min cues  4. Complete an objective swallow study (i.e., MBSS) to assess swallow integrity, r/o aspiration, and determine of safest LRD, min A    Outcome: Progressing Towards Goal      SPEECH LANGUAGE PATHOLOGY BEDSIDE SWALLOW   EVALUATION & TREATMENT     Patient: Denys Juarez (17 y.o. male)  Date: 7/8/2019  Primary Diagnosis: Pneumonia [J18.9]  COPD with acute exacerbation ([address] [J44.1]  Pneumonia [J18.9]  COPD with acute exacerbation ([address] [J44.1]        Precautions: Aspiration     PLOF: Independent    ASSESSMENT :  Based on the objective data described below, the patient presents with mild oropharyngeal dysphagia s/p extubation 7/8. A&Ox3. Vocal quality hoarse but audible. Oral-motor exam revealed pt with incomplete dentition; however, all other structures grossly intact for mastication and deglutition. Accepted SLP-fed ice chips, thin liquid, puree and solid trials. Patient observed self-feeding thin liquid +/- straw, puree and solid trials. Pt exhibited moderately delayed mastication of solids, with otherwise adequate bolus cohesion, manipulation and A-P transit. Further exhibited adequate swallow timing/reflex and hyolaryngeal excursion. Able to manipulate and clear with 0 clinical s/s aspiration. Recommend soft solid, thin liquid diet with aspiration precautions. Patient may benefit from MBSS if silent aspiration is a concern. D/w RN, Christian Artis.      TREATMENT :  Skilled therapy initiated; Educated pt on aspiration precautions and importance of compensatory swallow techniques to decrease aspiration risk (decrease rate of intake & sip/bite size, upright @HOB for all po intake and ~30 minutes after po); verbalized comprehension. SLP to follow as indicated. Patient will benefit from skilled intervention to address the above impairments. Patient's rehabilitation potential is considered to be Fair  Factors which may influence rehabilitation potential include:   ? None noted  ? Mental ability/status  ? Medical condition  ? Home/family situation and support systems  ? Safety awareness  ? Pain tolerance/management  ? Other:      PLAN :  Recommendations and Planned Interventions:  Soft solid/thin liquid   Frequency/Duration: Patient will be followed by speech-language pathology 1-2 times per day/4-7 days per week to address goals. Discharge Recommendations: To Be Determined     SUBJECTIVE:   Patient stated ? My mouth was dry? .    OBJECTIVE:     Past Medical History:   Diagnosis Date    Cancer ([address]     prostate    COPD (chronic obstructive pulmonary disease) (HCC)     Hypertension     Pneumonia     Radiation effect      Past Surgical History:   Procedure Laterality Date    HX HERNIA REPAIR       Home Situation:   Home Situation  Home Environment: Trailer/mobile home  # Steps to Enter: 6  One/Two Story Residence: One story  Living Alone: No  Support Systems: Spouse/Significant Other/Partner  Patient Expects to be Discharged to[de-identified] Trailer/mobile home  Current DME Used/Available at Home: Cane, straight, Nebulizer    Diet prior to admission: Soft/thin   Current Diet: Soft/thin    Cognitive and Communication Status:  Neurologic State: Alert  Orientation Level: Oriented to person, Oriented to place, Oriented to situation  Cognition: Follows commands  Perception: Appears intact  Perseveration: No perseveration noted  Safety/Judgement: Awareness of environment, Good awareness of safety precautions  Oral Assessment:  Oral Assessment  Labial: No impairment  Dentition: Natural;Limited  Oral Hygiene: Fair  Lingual: No impairment  Velum: No impairment  Mandible: No impairment  P.O. Trials:  Patient Position: HOB 60  Vocal quality prior to P.O.: Hoarse  Consistency Presented: Thin liquid;Puree; Solid  How Presented: Self-fed/presented;SLP-fed/presented;Cup/sip;Straw;Successive swallows     Bolus Acceptance: No impairment  Bolus Formation/Control: Impaired  Type of Impairment: Delayed;Mastication  Propulsion: Delayed (# of seconds)  Oral Residue: None  Initiation of Swallow: No impairment  Laryngeal Elevation: Functional  Aspiration Signs/Symptoms: None  Pharyngeal Phase Characteristics: No impairment, issues, or problems   Effective Modifications: Alternate liquids/solids;Small sips and bites  Cues for Modifications: Minimal-moderate       Oral Phase Severity: Mild  Pharyngeal Phase Severity : Mild    PAIN:  Pain level pre-treatment: 0/10   Pain level post-treatment: 0/10     After treatment:   ?            Patient left in no apparent distress sitting up in chair  ? Patient left in no apparent distress in bed  ? Call bell left within reach  ? Nursing notified  ? Family present  ? Caregiver present  ? Bed alarm activated    COMMUNICATION/EDUCATION:   ?            Aspiration precautions; swallow safety; compensatory techniques. ?            Patient/family have participated as able in goal setting and plan of care. ?            Patient/family agree to work toward stated goals and plan of care. ?            Patient understands intent and goals of therapy; neutral about participation. ? Patient unable to participate in goal setting/plan of care; educ ongoing with interdisciplinary staff  ? Posted safety precautions in patient's room.     Thank you for this referral.  Sahra Barksdale, SLP  Time Calculation: 17 mins  Evaluation Time: 9 minutes   Treatment Time: 8 minutes

## 2019-07-08 NOTE — PROGRESS NOTES
Reason for Admission:   C/o SOB                  RRAT Score:    20              Do you (patient/family) have any concerns for transition/discharge? Not at this time                Plan for utilizing home health:   TBD    Current Advanced Directive/Advance Care Plan: On chart            Transition of Care Plan:   Chart reviewed per ED notes pt arrived via EMS from home, c/o worsening of SOB for 2 weeks, pt denied using home 02, Hx of COPD, HTN,prostate cancer, pt at this time remains in icu on vent support, cm will cont to review and remains available for d/c planning. Care Management Interventions  PCP Verified by CM: Yes  Palliative Care Criteria Met (RRAT>21 & CHF Dx)?: No     1200- cm met with pt and wife at bedside, pt [place] has hearing aid in left ear only, cm role as d/c planner explained,pt lives wife independently still drives short distances,pt was extubated today and doing well on 2/L NC,pt denies using home walker or home 02, does have home nebulizer, when discharged pt plans to return back home, patients daughter and granddaughter visited while cm was present stated they live near and often visits for well checks. Pt and family aware that cm will cont to review case for d/c needs.

## 2019-07-08 NOTE — PROGRESS NOTES
Problem: Dysphagia (Adult) Goal: *Acute Goals and Plan of Care (Insert Text) Description Recommendations: 
Diet: Soft solid/thin liquid Meds: Per patient preference Aspiration Precautions Oral Care TID Goals:  Patient will: 1. Tolerate PO trials with 0 s/s overt distress in 4/5 trials 2. Utilize compensatory swallow strategies/maneuvers (decrease bite/sip, size/rate, alt. liq/sol) with min cues in 4/5 trials 3. Perform oral-motor/laryngeal exercises to increase oropharyngeal swallow function with min cues 4. Complete an objective swallow study (i.e., MBSS) to assess swallow integrity, r/o aspiration, and determine of safest LRD, min A  
7/8/2019 1527 by Vilma Jimenez SLP Outcome: Progressing Towards Goal 
 
 
SPEECH LANGUAGE PATHOLOGY BEDSIDE SWALLOW  
EVALUATION & TREATMENT Patient: Price Lambert (51 y.o. male) Date: 7/8/2019 Primary Diagnosis: Pneumonia [J18.9] COPD with acute exacerbation ([address] [J44.1] Pneumonia [J18.9] COPD with acute exacerbation ([address] [J44.1] Precautions: Aspiration PLOF: Independent ASSESSMENT : 
Based on the objective data described below, the patient presents with mild oropharyngeal dysphagia s/p extubation 7/8. Pt with PNA and COPD. A&Ox4. Multiple family members at bedside. Oral-motor exam revealed structures grossly intact for mastication and deglutition. Patient observed self-feeding thin liquid +/- straw, puree and solid trials. Pt exhibited adequate bolus cohesion, manipulation and A-P transit. Further exhibited adequate swallow timing/reflex and hyolaryngeal excursion. Able to manipulate and clear with 0 clinical s/s aspiration. Recommend soft solid, thin liquid diet with aspiration precautions. Patient may benefit from MBSS if silent aspiration is a concern. D/w RN, Caitlin Valadez.   
 
TREATMENT : 
Skilled therapy initiated; Educated pt on aspiration precautions and importance of compensatory swallow techniques to decrease aspiration risk (decrease rate of intake & sip/bite size, upright @HOB for all po intake and ~30 minutes after po); verbalized comprehension. SLP will follow as indicated. Patient will benefit from skilled intervention to address the above impairments. Patient's rehabilitation potential is considered to be Good Factors which may influence rehabilitation potential include: ? None noted ? Mental ability/status ? Medical condition ? Home/family situation and support systems ? Safety awareness ? Pain tolerance/management ? Other: PLAN : 
Recommendations and Planned Interventions: 
Regular/thin Frequency/Duration: Patient will be followed by speech-language pathology 1-2 times to address goals. Discharge Recommendations: To Be Determined SUBJECTIVE:  
Patient stated ? I always eat slow? . 
 
OBJECTIVE:  
 
Past Medical History:  
Diagnosis Date Cancer [facility])   
 prostate COPD (chronic obstructive pulmonary disease) ([facility] [address] Hypertension Pneumonia Radiation effect Past Surgical History:  
Procedure Laterality Date HX HERNIA REPAIR Home Situation:  
Home Situation Home Environment: Trailer/mobile home # Steps to Enter: 6 One/Two Story Residence: One story Living Alone: No 
Support Systems: Spouse/Significant Other/Partner Patient Expects to be Discharged to[de-identified] Trailer/mobile home Current DME Used/Available at Home: Cane, straight, Nebulizer Diet prior to admission: Regular/thin Current Diet:  Regular/thin  
Cognitive and Communication Status: 
Neurologic State: Alert Orientation Level: Oriented to person, Oriented to place, Oriented to situation Cognition: Follows commands Perception: Appears intact Perseveration: No perseveration noted Safety/Judgement: Awareness of environment, Good awareness of safety precautions Oral Assessment: 
Oral Assessment Labial: No impairment Dentition: Natural;Limited Oral Hygiene: Aziza Kohler Lingual: No impairment Velum: No impairment Mandible: No impairment P.O. Trials: 
Patient Position: RVV 57 Vocal quality prior to P.O.: Hoarse Consistency Presented: Thin liquid;Puree; Solid How Presented: Self-fed/presented;SLP-fed/presented;Cup/sip;Straw;Successive swallows Bolus Acceptance: No impairment Bolus Formation/Control: Impaired Type of Impairment: Delayed;Mastication Propulsion: Delayed (# of seconds) Oral Residue: None Initiation of Swallow: No impairment Laryngeal Elevation: Functional 
Aspiration Signs/Symptoms: None Pharyngeal Phase Characteristics: No impairment, issues, or problems Effective Modifications: Alternate liquids/solids;Small sips and bites Cues for Modifications: Minimal-moderate Oral Phase Severity: Mild Pharyngeal Phase Severity : Mild PAIN: 
Pain level pre-treatment: 0/10 Pain level post-treatment: 0/10 After treatment:  
?            Patient left in no apparent distress sitting up in chair ? Patient left in no apparent distress in bed ? Call bell left within reach ? Nursing notified ? Family present ? Caregiver present ? Bed alarm activated COMMUNICATION/EDUCATION:  
?            Aspiration precautions; swallow safety; compensatory techniques. ?            Patient/family have participated as able in goal setting and plan of care. ?            Patient/family agree to work toward stated goals and plan of care. ?            Patient understands intent and goals of therapy; neutral about participation. ? Patient unable to participate in goal setting/plan of care; educ ongoing with interdisciplinary staff ? Posted safety precautions in patient's room. Thank you for this referral. 
FADI Grant Time Calculation: 17 mins Evaluation Time: 8 minutes Treatment Time: 9 minutes

## 2019-07-08 NOTE — CONSULTS
[facility]  Pulmonary, Critical Care, and Sleep Medicine    PCCM Note    Name: Eduardo Dallas MRN: [id number]   : 1945 Hospital: [facility] [place]   Date: 2019  Room: [facility] [place]     Subjective:   Patient is a 74 y.o. 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 Patient was admitted yesterday with COPD exacerbation to tele floor. He seemed to have worsened overnight, failed BIPAP and was subsequently intubated. 19   Patient in icu, intubated and awake, off of sedated now  On SBT. Good cough and gag reflexes  Minimal ET secretions. Denies any SOB on SBT  Following all commands  Hemodynamically stable. No wheezing or hemoptysis. Past Medical History:   Diagnosis Date    Cancer [facility])     prostate    COPD (chronic obstructive pulmonary disease) ([facility] [address]     Hypertension     Pneumonia     Radiation effect       Past Surgical History:   Procedure Laterality Date    HX HERNIA REPAIR        Prior to Admission medications    Medication Sig Start Date End Date Taking? Authorizing Provider   albuterol (PROVENTIL VENTOLIN) 2.5 mg /3 mL (0.083 %) nebulizer solution 3 mL by Nebulization route every two (2) hours as needed for Wheezing. 19   Kathy Velazquez, DO   albuterol-ipratropium (DUO-NEB) 2.5 mg-0.5 mg/3 ml nebu 3 mL by Nebulization route every four (4) hours as needed. 19   Kathy Velazquez, DO   chlorpheniramine-HYDROcodone (TUSSIONEX) 10-8 mg/5 mL suspension Take 5 mL by mouth every twelve (12) hours as needed for Cough. Max Daily Amount: 10 mL. 19   Kathy Velazquez, DO   predniSONE (DELTASONE) 20 mg tablet 2 tabs daily x 3 days then 1 tab daily x 3 days then 1/2 tab daily x 3 days 19   Kathy Velazquez, DO   fluticasone furoate (ARNUITY ELLIPTA) 100 mcg/actuation dsdv inhaler Take 1 Puff by inhalation daily.  10/24/18   Bharat Urrutia PA-C   acetaminophen (TYLENOL ARTHRITIS PAIN) 650 mg TbER Take 1 Tab by mouth every eight (8) hours. 9/8/18   Tanya TREJO PA-C   furosemide (LASIX) 20 mg tablet Take 20 mg by mouth daily. Blayne Bran MD   albuterol (PROVENTIL HFA, VENTOLIN HFA, PROAIR HFA) 90 mcg/actuation inhaler Take 2 Puffs by inhalation every four (4) hours as needed for Wheezing or Shortness of Breath. 4/23/18   Nicolasa Knox PA-C   ipratropium (ATROVENT) 0.03 % nasal spray 2 Sprays every twelve (12) hours. Blayne Bran MD   tamsulosin (FLOMAX) 0.4 mg capsule Take 0.4 mg by mouth daily. Blayne Bran MD   albuterol (PROVENTIL VENTOLIN) 2.5 mg /3 mL (0.083 %) nebulizer solution 3 mL by Nebulization route every four (4) hours as needed for Wheezing. 12/23/15   TRINA Hickey   chlorthalidone (HYGROTEN) 25 mg tablet Take  by mouth daily.     Blayne Bran MD   Nebulizer & Compressor machine Dispense: 1 nebulizer machine w all tubing necessary 10/6/14   Lisette Trell, DO     Allergies   Allergen Reactions    Aspirin Other (comments)     \"messes my stomach up\"      Social History     Tobacco Use    Smoking status: Former Smoker     Types: Cigarettes    Smokeless tobacco: Never Used   Substance Use Topics    Alcohol use: No      Family History   Problem Relation Age of Onset    Heart Disease Mother         Current Facility-Administered Medications   Medication Dose Route Frequency    potassium chloride 10 mEq in 100 ml IVPB  10 mEq IntraVENous Q1H    furosemide (LASIX) injection 20 mg  20 mg IntraVENous ONCE    Vancomycin- Pharmacy to dose  1 Each Other Rx Dosing/Monitoring    vancomycin (VANCOCIN) 1250 mg in  ml infusion  1,250 mg IntraVENous Q18H    propofol (DIPRIVAN) infusion  0-50 mcg/kg/min IntraVENous TITRATE    midazolam in normal saline (VERSED) 2 mg/mL infusion  1-5 mg/hr IntraVENous TITRATE    budesonide (PULMICORT) 500 mcg/2 ml nebulizer suspension  500 mcg Nebulization BID RT    albuterol-ipratropium (DUO-NEB) 2.5 MG-0.5 MG/3 ML  3 mL Nebulization Q4H RT    chlorhexidine (PERIDEX) 0.12 % mouthwash 10 mL  10 mL Oral Q12H    methylPREDNISolone (PF) (Solu-MEDROL) injection 60 mg  60 mg IntraVENous Q6H    piperacillin-tazobactam (ZOSYN) 3.375 g in 0.9% sodium chloride (MBP/ADV) 100 mL MBP  3.375 g IntraVENous Q8H    insulin lispro (HUMALOG) injection   SubCUTAneous Q6H    levoFLOXacin (LEVAQUIN) 750 mg in D5W IVPB  750 mg IntraVENous Q24H    heparin (porcine) injection 5,000 Units  5,000 Units SubCUTAneous Q8H    acetaminophen (TYLENOL) tablet 650 mg  650 mg Oral Q8H    doxazosin (CARDURA) tablet 1 mg  1 mg Oral DAILY    pantoprazole (PROTONIX) 40 mg in sodium chloride 0.9% 10 mL injection  40 mg IntraVENous DAILY       Latest lactic acid:   Lactic acid   Date Value Ref Range Status   2019 1.4 0.4 - 2.0 MMOL/L Final   2018 0.9 0.4 - 2.0 MMOL/L Final   2018 1.6 0.4 - 2.0 MMOL/L Final       Review of Systems:  Limited due to patient condition     Objective:   Vital Signs:    Visit Vitals  /79   Pulse 91   Temp 97.8 °F (36.6 °C)   Resp (!) 37   Ht 5' 8\" (1.727 m)   Wt 83.9 kg (185 lb)   SpO2 100%   BMI 28.13 kg/m²       O2 Device: Endotracheal tube, Ventilator   O2 Flow Rate (L/min): 0 l/min   Temp (24hrs), Av.7 °F (36.5 °C), Min:97.4 °F (36.3 °C), Max:98.2 °F (36.8 °C)       Intake/Output:   Last shift:      No intake/output data recorded.   Last 3 shifts:  190 -  0700  In: 6613.2 [I.V.:6613.2]  Out: 4200 [Urine:4200]    Intake/Output Summary (Last 24 hours) at 2019 0900  Last data filed at 2019 0650  Gross per 24 hour   Intake 3246.61 ml   Output 3450 ml   Net -203.39 ml        ABG:    Lab Results   Component Value Date/Time    PHI 7.365 2019 05:08 AM    PCO2I 36.3 2019 05:08 AM    PO2I 85 2019 05:08 AM    HCO3I 20.9 (L) 2019 05:08 AM    FIO2I 0.30 2019 05:08 AM     Ventilator Mode: Assist control, VC+  Respiratory Rate  Back-Up Rate: 16  Insp Time (sec): 0.9 sec  I:E Ratio: 1:3.17  Ventilator Volumes  Vt Set (ml): 450 ml  Vt Exhaled (Machine Breath) (ml): 502 ml  Vt Spont (ml): 713 ml  Ve Observed (l/min): 15 l/min  Ventilator Pressures  PC Set: 15  PIP Observed (cm H2O): 20 cm H2O  Plateau Pressure (cm H2O): 15 cm H2O  MAP (cm H2O): 11  PEEP/VENT (cm H2O): 5 cm H20       Physical Exam:   Gene: comfortable; awake, alert, following all commands, on vent support  HEENT: pupils not dilated, reactive, no scleral jaundice, moist oral mucosa, no nasal drainage; orotracheal and orogastric tubes  Neck: No adenopathy or thyroid swelling  CVS: S1S2 no murmurs; JVD not elevated  RS: Mod to poor air entry bilaterally, symmetrical BS; no bilateral expiratory wheezes, mild bi-basal crackles  Abd: soft, non tender, no hepatosplenomegaly, no abd distension, no guarding or rigidity, bowel sounds heard  Neuro: intubated, sedated, limited exam  Extrm: no leg edema or swelling or clubbing  Skin: no rash  Lymphatic: no cervical or supraclavicular adenopathy    Telemetry: NSR    Data review:     Recent Results (from the past 24 hour(s))   GLUCOSE, POC    Collection Time: 07/07/19  9:17 AM   Result Value Ref Range    Glucose (POC) 142 (H) 70 - 110 mg/dL   CULTURE, RESPIRATORY/SPUTUM/BRONCH W GRAM STAIN    Collection Time: 07/07/19 10:50 AM   Result Value Ref Range    Special Requests: NO SPECIAL REQUESTS      GRAM STAIN >25 WBC/lpf      GRAM STAIN <10 EPI/lpf      GRAM STAIN MUCUS PRESENT      GRAM STAIN NO ORGANISMS SEEN      Culture result: PENDING    GLUCOSE, POC    Collection Time: 07/07/19 11:55 AM   Result Value Ref Range    Glucose (POC) 152 (H) 70 - 110 mg/dL   GLUCOSE, POC    Collection Time: 07/07/19  5:48 PM   Result Value Ref Range    Glucose (POC) 145 (H) 70 - 110 mg/dL   GLUCOSE, POC    Collection Time: 07/07/19 11:18 PM   Result Value Ref Range    Glucose (POC) 150 (H) 70 - 110 mg/dL   CBC W/O DIFF    Collection Time: 07/08/19  3:20 AM   Result Value Ref Range WBC 9.9 4.6 - 13.2 K/uL    RBC 4.03 (L) 4.70 - 5.50 M/uL    HGB 11.2 (L) 13.0 - 16.0 g/dL    HCT 33.2 (L) 36.0 - 48.0 %    MCV 82.4 74.0 - 97.0 FL    MCH 27.8 24.0 - 34.0 PG    MCHC 33.7 31.0 - 37.0 g/dL    RDW 13.9 11.6 - 14.5 %    PLATELET 928 189 - 240 K/uL    MPV 8.4 (L) 9.2 - 41.4 FL   METABOLIC PANEL, BASIC    Collection Time: 07/08/19  3:20 AM   Result Value Ref Range    Sodium 137 136 - 145 mmol/L    Potassium 3.1 (L) 3.5 - 5.5 mmol/L    Chloride 106 100 - 108 mmol/L    CO2 22 21 - 32 mmol/L    Anion gap 9 3.0 - 18 mmol/L    Glucose 148 (H) 74 - 99 mg/dL    BUN 22 (H) 7.0 - 18 MG/DL    Creatinine 1.11 0.6 - 1.3 MG/DL    BUN/Creatinine ratio 20 12 - 20      GFR est AA >60 >60 ml/min/1.73m2    GFR est non-AA >60 >60 ml/min/1.73m2    Calcium 8.2 (L) 8.5 - 10.1 MG/DL   PROTHROMBIN TIME + INR    Collection Time: 07/08/19  3:20 AM   Result Value Ref Range    Prothrombin time 12.2 11.5 - 15.2 sec    INR 0.9 0.8 - 1.2     MAGNESIUM    Collection Time: 07/08/19  3:20 AM   Result Value Ref Range    Magnesium 2.1 1.6 - 2.6 mg/dL   POC G3    Collection Time: 07/08/19  5:08 AM   Result Value Ref Range    Device: VENT      FIO2 (POC) 0.30 %    pH (POC) 7.365 7.35 - 7.45      pCO2 (POC) 36.3 35.0 - 45.0 MMHG    pO2 (POC) 85 80 - 100 MMHG    HCO3 (POC) 20.9 (L) 22 - 26 MMOL/L    sO2 (POC) 96 92 - 97 %    Base deficit (POC) 5 mmol/L    Mode ASSIST CONTROL      Tidal volume 450 ml    Set Rate 16 bpm    PEEP/CPAP (POC) 5 cmH2O    PIP (POC) 20      Allens test (POC) N/A      Inspiratory Time 0.90 sec    Total resp. rate 16      Site RIGHT RADIAL      Patient temp.  97.5      Specimen type (POC) ARTERIAL      Performed by Kari Orta     Volume control plus YES     GLUCOSE, POC    Collection Time: 07/08/19  6:00 AM   Result Value Ref Range    Glucose (POC) 148 (H) 70 - 110 mg/dL           Recent Labs     07/08/19  0508 07/07/19  0728 07/07/19  0414   FIO2I 0.30 50 0.50   HCO3I 20.9* 19.5* 21.7*   PCO2I 36.3 44.3 45.5*   PHI 7.365 7.252* 7.284*   PO2I 85 101* 218*     EKG  Diagnosis   Final   Sinus tachycardia   Otherwise normal ECG   When compared with ECG of 08-FEB-2019 12:15,   aberrant conduction is no longer present   T wave inversion no longer evident in Anterior leads   Confirmed by Cedric Martin MD, -- ([id number]) on 7/6/2019 11:48:08 AM      Echo March 2017  SUMMARY:  Left ventricle: Systolic function was normal by visual assessment. Ejection fraction was estimated in the range of 55 % to 60 %. Poor resolution of endocardial border. Doppler parameters were consistent with abnormal left ventricular relaxation (grade 1 diastolic dysfunction). COMPARISONS:  Comparison was made with the previous study of 03-Oct-2014.       All Micro Results     Procedure Component Value Units Date/Time    CULTURE, BLOOD [[id number]] Collected:  07/06/19 0700    Order Status:  Completed Specimen:  Whole Blood Updated:  07/08/19 0630     Special Requests: NO SPECIAL REQUESTS        Culture result: NO GROWTH 2 DAYS       CULTURE, BLOOD [[id number]] Collected:  07/06/19 0742    Order Status:  Completed Specimen:  Whole Blood Updated:  07/08/19 0630     Special Requests: NO SPECIAL REQUESTS        Culture result: NO GROWTH 2 DAYS       CULTURE, BLOOD [[id number]] Collected:  07/07/19 0400    Order Status:  Completed Specimen:  Blood Updated:  07/08/19 0630     Special Requests: NO SPECIAL REQUESTS        Culture result: NO GROWTH 1 DAY       CULTURE, RESPIRATORY/SPUTUM/BRONCH Lorence Harsh STAIN [[id number]] Collected:  07/07/19 1050    Order Status:  Completed Specimen:  Sputum from Tracheal Aspirate Updated:  07/07/19 2223     Special Requests: NO SPECIAL REQUESTS        GRAM STAIN >25 WBC/lpf         <10 EPI/lpf         MUCUS PRESENT         NO ORGANISMS SEEN        Culture result: PENDING    CULTURE, BLOOD [[id number]] Collected:  07/07/19 0400    Order Status:  Canceled             Imaging:  [x]I have personally reviewed the patients chest radiographs images and report   CXR port 7/8/19  1. Endotracheal tube, left IJ central venous catheter, and visualized nasogastric tube as above. 2. Left retrocardiac atelectasis or airspace disease similar to preceding CT exam.  3. Unchanged right-sided pleural-based calcifications and peripherally calcified chronic right pleural effusion      Results from Hospital Encounter encounter on 07/06/19   XR ABD (KUB)    Narrative EXAM: KUB    INDICATION: OG tube placement    COMPARISON: CTA chest done earlier today    _______________    FINDINGS:    Portable supine abdominal film was performed. NG tube extends well into the  stomach. It was in the distal esophagus on CTA chest done earlier today. Nonobstructive bowel gas pattern.    _______________      Impression IMPRESSION:      1. Nasogastric tube good position. Results from Hospital Encounter encounter on 07/06/19   CTA CHEST W OR W WO CONT    Narrative EXAM: CTA chest    INDICATION: Pneumonia with COPD exacerbation. Intubated    COMPARISON: CT chest 10/20/2018. AP portable chest done earlier today. TECHNIQUE: Axial CT imaging from the thoracic inlet through the diaphragm with  intravenous contrast. Coronal and sagittal MIP reformats were generated. One or more dose reduction techniques were used on this CT: automated exposure  control, adjustment of the mAs and/or kVp according to patient size, and  iterative reconstruction techniques. The specific techniques used on this CT  exam have been documented in the patient's electronic medical record. Digital  Imaging and Communications in Medicine (DICOM) format image data are available  to nonaffiliated external healthcare facilities or entities on a secure, media  free, reciprocally searchable basis with patient authorization for at least a  12-month period after this study. _______________    FINDINGS:    EXAM QUALITY: Overall exam quality is diagnostic.     PULMONARY ARTERIES: No evidence of pulmonary embolism. MEDIASTINUM: Heart size is normal. No pericardial effusion. Aorta is normal in  caliber. NG tube is present. Tip extends down to the GE junction but not  definitely into the stomach. LUNGS: There is dense consolidation of the left lower lobe. There is a new  finding compared to prior CT and is not apparent on AP portable chest film done  earlier today. Chronic atelectasis versus scarring posterior right lower lobe is  stable as is the subpleural anterior peripherally calcified mass in the right  upper lobe. PLEURA: Chronic right pleural effusion with peripheral calcification is stable  compared to prior CT.    AIRWAY: Endotracheal tube is present in good position terminating above the  juliana. LYMPH NODES: No enlarged nodes. UPPER ABDOMEN: Partially imaged left renal cyst. Upper abdominal structures are  otherwise unremarkable. OTHER: No acute or aggressive osseous abnormalities identified. _______________      Impression IMPRESSION:    1. No evidence of pulmonary embolism. 2.  New dense consolidation left lower lobe which could represent pneumonia or  atelectasis. 3. Chronic peripherally calcified right pleural effusion with adjacent  atelectasis or scarring. 4. NG tube traverses Dobbhoff feeding tube is present with the tip extending to  the GE junction but not definitely into the stomach. It should be advanced 8 to  10 cm for better positioning. Endotracheal tube good position.            IMPRESSION:   Principal Problem:    Acute respiratory failure with hypoxia     Pneumonia     COPD with acute exacerbation   Patient Active Problem List   Diagnosis Code    [facility]chronic obstructive [facility]) ([facility] J44.9    Hypertension I10    Tobacco abuse Z72.0    COPD (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 ([facility]) J44.9    Chronic renal disease, stage 3, moderately decreased glomerular filtration rate between 30-59 mL/min/1.73 square meter ([facility]) N18.3  COPD exacerbation ([facility] [address] J44.1    [facility] J61    Acute exacerbation of chronic obstructive pulmonary disease (COPD) ([facility]) J44.1    Pneumonia J18.9    COPD with acute exacerbation ([facility]) J44.1    Acute respiratory failure with hypoxia ([facility]) J96.01      RECOMMENDATIONS:   · Pulm: Vent management; VCV mode - 16/450/40%/5; PIP 23; ABGs shows mild metabolic and respiratory acidosis  · CTA chest negative for PE  · Budesonide neb; Duonebs; IV steroids  · Sedation: propofol and versed drip currently off for SBT; has PRN versed and fentanyl; on SBT and tolerating well  · Sedation and Vent Bundles; RASS -1 to -2  · Cardiac: Trops neg; BP stable  · ID: send resp cxs; broad spectrum antibiotics - levaquin, zosyn and iv vanc; narrow Ab based on cxs and clinical/radiological improvement in next 24-48 hrs  · Renal: watch UOP and renal fn; patient being hydrated  · GI: NPO for now; PPI. Diet based on ventilator liberation  · Hem: monitor Hb and Plts  · Endo: poc BG and SSI while on steroids  · Fluids: stop NS maintenance 100 /hr an Lasix 20 mg for ventilator liberation assist  · Proph:  DVt and GI proph - sc heparin and PPI   · Update family when available - none at bedside  Will defer respective systems problem management to primary and other consultant and follow patient in ICU with primary and other medical team  Further recommendations will be based on the patient's response to recommended treatment and results of the investigation ordered. Quality Care: PPI, DVT prophylaxis, HOB elevated, Infection control all reviewed and addressed.   PAIN AND SEDATION: follow RASS protocol as above  · Skin/Wound: no active issues  · Nutrition: NPO for now  · Prophylaxis: DVT and GI Prophylaxis reviewed  · PT/OT eval and treat: as needed  · Lines/Tubes: PIVs; ET tube, OG tube, Humphreys cath - all 7/7/19  ADVANCE DIRECTIVE: Full Code  Discussed with RN, RT and staff     High complexity decision making was performed in this consultation and evaluation of this patient who is at high risk for decompensation with multiple organ involvement  Total critical care time spent rendering care exclusive of procedures: 39 minutes     Diamond Talbot MD

## 2019-07-08 NOTE — PROGRESS NOTES
0720  Bedside shift change report received from Carie Arroyo RN. Report included the following information SBAR, Kardex, Intake/Output, MAR, Recent Results, Cardiac Rhythm SR and Alarm Parameters . 0730 Started Sedation Vacation    0825 RT started SBT.

## 2019-07-09 LAB
ANION GAP SERPL CALC-SCNC: 8 MMOL/L (ref 3–18)
BACTERIA SPEC CULT: NORMAL
BASOPHILS # BLD: 0 K/UL (ref 0–0.1)
BASOPHILS NFR BLD: 0 % (ref 0–2)
BUN SERPL-MCNC: 26 MG/DL (ref 7–18)
BUN/CREAT SERPL: 21 (ref 12–20)
CALCIUM SERPL-MCNC: 8.1 MG/DL (ref 8.5–10.1)
CHLORIDE SERPL-SCNC: 104 MMOL/L (ref 100–108)
CO2 SERPL-SCNC: 26 MMOL/L (ref 21–32)
CREAT SERPL-MCNC: 1.26 MG/DL (ref 0.6–1.3)
DIFFERENTIAL METHOD BLD: ABNORMAL
EOSINOPHIL # BLD: 0 K/UL (ref 0–0.4)
EOSINOPHIL NFR BLD: 0 % (ref 0–5)
ERYTHROCYTE [DISTWIDTH] IN BLOOD BY AUTOMATED COUNT: 14.6 % (ref 11.6–14.5)
GLUCOSE BLD STRIP.AUTO-MCNC: 134 MG/DL (ref 70–110)
GLUCOSE BLD STRIP.AUTO-MCNC: 155 MG/DL (ref 70–110)
GLUCOSE SERPL-MCNC: 140 MG/DL (ref 74–99)
GRAM STN SPEC: NORMAL
HCT VFR BLD AUTO: 32.7 % (ref 36–48)
HGB BLD-MCNC: 10.9 G/DL (ref 13–16)
INR PPP: 0.9 (ref 0.8–1.2)
LYMPHOCYTES # BLD: 0.2 K/UL (ref 0.9–3.6)
LYMPHOCYTES NFR BLD: 3 % (ref 21–52)
MAGNESIUM SERPL-MCNC: 2.1 MG/DL (ref 1.6–2.6)
MCH RBC QN AUTO: 27.8 PG (ref 24–34)
MCHC RBC AUTO-ENTMCNC: 33.3 G/DL (ref 31–37)
MCV RBC AUTO: 83.4 FL (ref 74–97)
MONOCYTES # BLD: 0.3 K/UL (ref 0.05–1.2)
MONOCYTES NFR BLD: 3 % (ref 3–10)
NEUTS SEG # BLD: 7.1 K/UL (ref 1.8–8)
NEUTS SEG NFR BLD: 94 % (ref 40–73)
PLATELET # BLD AUTO: 244 K/UL (ref 135–420)
PMV BLD AUTO: 8.4 FL (ref 9.2–11.8)
POTASSIUM SERPL-SCNC: 3.9 MMOL/L (ref 3.5–5.5)
POTASSIUM SERPL-SCNC: 4.3 MMOL/L (ref 3.5–5.5)
PROTHROMBIN TIME: 12.3 SEC (ref 11.5–15.2)
RBC # BLD AUTO: 3.92 M/UL (ref 4.7–5.5)
SERVICE CMNT-IMP: NORMAL
SODIUM SERPL-SCNC: 138 MMOL/L (ref 136–145)
WBC # BLD AUTO: 7.6 K/UL (ref 4.6–13.2)

## 2019-07-09 PROCEDURE — 77010033678 HC OXYGEN DAILY

## 2019-07-09 PROCEDURE — 77030027138 HC INCENT SPIROMETER -A

## 2019-07-09 PROCEDURE — 94640 AIRWAY INHALATION TREATMENT: CPT

## 2019-07-09 PROCEDURE — 36415 COLL VENOUS BLD VENIPUNCTURE: CPT

## 2019-07-09 PROCEDURE — 82962 GLUCOSE BLOOD TEST: CPT

## 2019-07-09 PROCEDURE — 74011250637 HC RX REV CODE- 250/637: Performed by: INTERNAL MEDICINE

## 2019-07-09 PROCEDURE — 74011250636 HC RX REV CODE- 250/636: Performed by: INTERNAL MEDICINE

## 2019-07-09 PROCEDURE — 65270000029 HC RM PRIVATE

## 2019-07-09 PROCEDURE — 92526 ORAL FUNCTION THERAPY: CPT

## 2019-07-09 PROCEDURE — 85025 COMPLETE CBC W/AUTO DIFF WBC: CPT

## 2019-07-09 PROCEDURE — 74011000258 HC RX REV CODE- 258: Performed by: FAMILY MEDICINE

## 2019-07-09 PROCEDURE — 74011250636 HC RX REV CODE- 250/636: Performed by: FAMILY MEDICINE

## 2019-07-09 PROCEDURE — C9113 INJ PANTOPRAZOLE SODIUM, VIA: HCPCS | Performed by: FAMILY MEDICINE

## 2019-07-09 PROCEDURE — 74011250637 HC RX REV CODE- 250/637: Performed by: FAMILY MEDICINE

## 2019-07-09 PROCEDURE — 80048 BASIC METABOLIC PNL TOTAL CA: CPT

## 2019-07-09 PROCEDURE — 83735 ASSAY OF MAGNESIUM: CPT

## 2019-07-09 PROCEDURE — 85610 PROTHROMBIN TIME: CPT

## 2019-07-09 PROCEDURE — 74011000250 HC RX REV CODE- 250: Performed by: FAMILY MEDICINE

## 2019-07-09 RX ORDER — POTASSIUM CHLORIDE 750 MG/1
10 TABLET, EXTENDED RELEASE ORAL
Status: COMPLETED | OUTPATIENT
Start: 2019-07-09 | End: 2019-07-09

## 2019-07-09 RX ORDER — INSULIN LISPRO 100 [IU]/ML
INJECTION, SOLUTION INTRAVENOUS; SUBCUTANEOUS
Status: DISCONTINUED | OUTPATIENT
Start: 2019-07-09 | End: 2019-07-09

## 2019-07-09 RX ADMIN — DOXAZOSIN 1 MG: 1 TABLET ORAL at 08:51

## 2019-07-09 RX ADMIN — METHYLPREDNISOLONE SODIUM SUCCINATE 40 MG: 125 INJECTION, POWDER, FOR SOLUTION INTRAMUSCULAR; INTRAVENOUS at 18:01

## 2019-07-09 RX ADMIN — POLYVINYL ALCOHOL 1 DROP: 14 SOLUTION/ DROPS OPHTHALMIC at 04:58

## 2019-07-09 RX ADMIN — LEVOFLOXACIN 750 MG: 5 INJECTION, SOLUTION INTRAVENOUS at 06:40

## 2019-07-09 RX ADMIN — IPRATROPIUM BROMIDE AND ALBUTEROL SULFATE 3 ML: .5; 3 SOLUTION RESPIRATORY (INHALATION) at 00:03

## 2019-07-09 RX ADMIN — IPRATROPIUM BROMIDE AND ALBUTEROL SULFATE 3 ML: .5; 3 SOLUTION RESPIRATORY (INHALATION) at 15:34

## 2019-07-09 RX ADMIN — METHYLPREDNISOLONE SODIUM SUCCINATE 60 MG: 125 INJECTION, POWDER, FOR SOLUTION INTRAMUSCULAR; INTRAVENOUS at 06:40

## 2019-07-09 RX ADMIN — PIPERACILLIN SODIUM,TAZOBACTAM SODIUM 3.38 G: 3; .375 INJECTION, POWDER, FOR SOLUTION INTRAVENOUS at 18:01

## 2019-07-09 RX ADMIN — HEPARIN SODIUM 5000 UNITS: 5000 INJECTION INTRAVENOUS; SUBCUTANEOUS at 00:46

## 2019-07-09 RX ADMIN — ACETAMINOPHEN 650 MG: 325 TABLET ORAL at 00:46

## 2019-07-09 RX ADMIN — PIPERACILLIN SODIUM,TAZOBACTAM SODIUM 3.38 G: 3; .375 INJECTION, POWDER, FOR SOLUTION INTRAVENOUS at 08:50

## 2019-07-09 RX ADMIN — BUDESONIDE 500 MCG: 0.5 INHALANT RESPIRATORY (INHALATION) at 07:20

## 2019-07-09 RX ADMIN — IPRATROPIUM BROMIDE AND ALBUTEROL SULFATE 3 ML: .5; 3 SOLUTION RESPIRATORY (INHALATION) at 20:01

## 2019-07-09 RX ADMIN — SODIUM CHLORIDE 40 MG: 9 INJECTION INTRAMUSCULAR; INTRAVENOUS; SUBCUTANEOUS at 08:50

## 2019-07-09 RX ADMIN — METHYLPREDNISOLONE SODIUM SUCCINATE 60 MG: 125 INJECTION, POWDER, FOR SOLUTION INTRAMUSCULAR; INTRAVENOUS at 00:48

## 2019-07-09 RX ADMIN — IPRATROPIUM BROMIDE AND ALBUTEROL SULFATE 3 ML: .5; 3 SOLUTION RESPIRATORY (INHALATION) at 11:02

## 2019-07-09 RX ADMIN — HEPARIN SODIUM 5000 UNITS: 5000 INJECTION INTRAVENOUS; SUBCUTANEOUS at 08:51

## 2019-07-09 RX ADMIN — METHYLPREDNISOLONE SODIUM SUCCINATE 40 MG: 125 INJECTION, POWDER, FOR SOLUTION INTRAMUSCULAR; INTRAVENOUS at 11:45

## 2019-07-09 RX ADMIN — POTASSIUM CHLORIDE 10 MEQ: 10 TABLET, EXTENDED RELEASE ORAL at 00:46

## 2019-07-09 RX ADMIN — ACETAMINOPHEN 650 MG: 325 TABLET ORAL at 18:02

## 2019-07-09 RX ADMIN — PIPERACILLIN SODIUM,TAZOBACTAM SODIUM 3.38 G: 3; .375 INJECTION, POWDER, FOR SOLUTION INTRAVENOUS at 00:46

## 2019-07-09 RX ADMIN — IPRATROPIUM BROMIDE AND ALBUTEROL SULFATE 3 ML: .5; 3 SOLUTION RESPIRATORY (INHALATION) at 07:20

## 2019-07-09 RX ADMIN — ACETAMINOPHEN 650 MG: 325 TABLET ORAL at 08:51

## 2019-07-09 RX ADMIN — VANCOMYCIN HYDROCHLORIDE 1250 MG: 10 INJECTION, POWDER, LYOPHILIZED, FOR SOLUTION INTRAVENOUS at 10:42

## 2019-07-09 RX ADMIN — HEPARIN SODIUM 5000 UNITS: 5000 INJECTION INTRAVENOUS; SUBCUTANEOUS at 18:01

## 2019-07-09 RX ADMIN — IPRATROPIUM BROMIDE AND ALBUTEROL SULFATE 3 ML: .5; 3 SOLUTION RESPIRATORY (INHALATION) at 04:58

## 2019-07-09 RX ADMIN — BUDESONIDE 500 MCG: 0.5 INHALANT RESPIRATORY (INHALATION) at 20:01

## 2019-07-09 NOTE — PROGRESS NOTES
6189 Bedside and Verbal shift change report received from MEGHNA Campbell RN. Report included the following information SBAR, Kardex, Intake/Output, MAR and Recent Results.

## 2019-07-09 NOTE — PROGRESS NOTES
Pt transferred to 3S from ICU. Breathing comfortably on RA, dyspnea with exertion. Compliant with IS and tx plan. Pending PT eval.  WDL on cardiac and pulseox remote monitors.

## 2019-07-09 NOTE — PROGRESS NOTES
conducted a Follow up consultation and Spiritual Assessment for Ashish Vincent, who is a 74 y. o.,male. Patient was much more upbeat and hopeful that yesterday. Wife was not present but is coming in. Continued the relationship of care and support. Listened empathically. Offered prayer and assurance of continued prayer on patients behalf. Chart reviewed. Patient expressed gratitude for Spiritual Care visit. Patient was reviewed in ICU Interdisciplinary Rounds. Chaplains will continue to follow and will provide pastoral care as needed or requested.  recommends bedside caregivers page the  on duty if patient shows signs of acute spiritual or emotional distress. [address] [id number].    Board Certified   [phone] - Office

## 2019-07-09 NOTE — PROGRESS NOTES
Pharmacy Dosing Services: Vancomycin    Consult provided for this 74y.o. year old male , for indication of Pneumonia (CAP)  Day of Therapy 3 of 7  Scr = 1.26   CrCl = 53.5 ml/min       Pt has current dose:  Vancomycin 1250 mg IV q18h  Vancomycin Trough ordered for  7/10/19 at 03:30 (30 minutes prior to 04:00 dose). Goal therapeutic trough:  15 - 20     Pharmacy to follow daily and will make changes to dose and/or frequency based on clinical status.   Pharmacist Ruth Reed, [address] [facility]

## 2019-07-09 NOTE — PROGRESS NOTES
Cm attended IDR;s verbal order from  to initiate PT,OT, plan is to transfer pt to floor today, after IDR's cm met with pt at bedside, offered LTSS pt declined states he applied approx 1 year ago and was told he was over income, pt made aware if he changes his mind to contact cm, white board updated. Care Management Interventions  PCP Verified by CM: Yes  Palliative Care Criteria Met (RRAT>21 & CHF Dx)?: Yes  Mode of Transport at Discharge: Other (see comment)(family)  Physical Therapy Consult: Yes  Occupational Therapy Consult: Yes  Current Support Network: Lives with Spouse  Plan discussed with Pt/Family/Caregiver:  Yes

## 2019-07-09 NOTE — PROGRESS NOTES
Problem: Dysphagia (Adult)  Goal: *Acute Goals and Plan of Care (Insert Text)  Description  Recommendations:  Diet: Soft solid/thin liquid   Meds: Per patient preference  Aspiration Precautions  Oral Care TID    Goals:  Patient will:  1. Tolerate PO trials with 0 s/s overt distress in 4/5 trials (goal met)  2. Utilize compensatory swallow strategies/maneuvers (decrease bite/sip, size/rate, alt. liq/sol) with min cues in 4/5 trials (goal met)  3. Perform oral-motor/laryngeal exercises to increase oropharyngeal swallow function with min cues (goal N/A)  4. Complete an objective swallow study (i.e., MBSS) to assess swallow integrity, r/o aspiration, and determine of safest LRD, min A (goal N/A)    7/9/2019 1026 by Omar Kilpatrick SLP  Outcome: Progressing Towards Goal    SPEECH LANGUAGE PATHOLOGY DYSPHAGIA TREATMENT    Patient: Brandon Romero (7 y.o. male)  Date: 7/9/2019  Diagnosis: Pneumonia [J18.9]  COPD with acute exacerbation ([address] [J44.1]  Pneumonia [J18.9]  COPD with acute exacerbation ([address] [J44.1] Pneumonia       Precautions: Aspiration    PLOF: Independent     ASSESSMENT:  Followed up with dysphagia intervention. A&Ox4. Patient observed self-feeding thin liquid + straw and solid trials. Pt exhibited moderately delayed mastication of solids, with otherwise adequate bolus cohesion, manipulation and A-P transit. Further exhibited adequate swallow timing/reflex and hyolaryngeal excursion. Able to manipulate and clear with 0 clinical s/s aspiration. Recommend pt continue soft solid, thin liquid diet with aspiration precautions. Patient may benefit from MBSS if silent aspiration is a concern. SLP will continue to follow. Progression toward goals:  ?         Improving appropriately and progressing toward goals  ? Improving slowly and progressing toward goals  ?          Not making progress toward goals and plan of care will be adjusted     PLAN:  Recommendations and Planned Interventions:  Soft/thin  Patient continues to benefit from skilled intervention to address the above impairments. Continue treatment per established plan of care. Discharge Recommendations: To Be Determined     SUBJECTIVE:   Patient stated ? I'm doing fine? .    OBJECTIVE:   Cognitive and Communication Status:  Neurologic State: Alert  Orientation Level: Oriented X4  Cognition: Appropriate decision making, Appropriate for age attention/concentration, Appropriate safety awareness, Follows commands  Perception: Appears intact  Perseveration: No perseveration noted  Safety/Judgement: Awareness of environment  Dysphagia Treatment:  Oral Assessment:  Oral Assessment  Labial: No impairment  Dentition: Natural, Limited  Oral Hygiene: Fair  Lingual: No impairment  Velum: No impairment  Mandible: No impairment  P.O. Trials:   Patient Position: [facility]   Vocal quality prior to P.O.: Hoarse   Consistency Presented: Thin liquid, Puree, Solid   How Presented: Self-fed/presented, SLP-fed/presented, Cup/sip, Straw, Successive swallows       Bolus Acceptance: No impairment   Bolus Formation/Control: Impaired   Type of Impairment: Delayed, Mastication   Propulsion: Delayed (# of seconds)   Oral Residue: None   Initiation of Swallow: No impairment   Laryngeal Elevation: Functional   Aspiration Signs/Symptoms: None   Pharyngeal Phase Characteristics: No impairment, issues, or problems    Effective Modifications: Alternate liquids/solids, Small sips and bites   Cues for Modifications: Minimal-moderate         Oral Phase Severity: Mild   Pharyngeal Phase Severity : Mild    PAIN:  Pain level pre-treatment: 0/10   Pain level post-treatment: 0/10     After treatment:   ?              Patient left in no apparent distress sitting up in chair  ? Patient left in no apparent distress in bed  ? Call bell left within reach  ? Nursing notified  ? Family present  ?               Caregiver present  ? Bed alarm activated      COMMUNICATION/EDUCATION:   ? Aspiration precautions; swallow safety; compensatory techniques  ? Patient unable to participate in education; education ongoing with staff  ? Posted safety precautions in patient's room.   ? Oral-motor/laryngeal strengthening exercises      Sharon Spencer, SLP  Time Calculation: 12 mins

## 2019-07-09 NOTE — PROGRESS NOTES
Palliative team, NP Juan Diego Alfred and this MSW, followed up with patient who was recently transferred out of ICU. Wife and granddaughter José Handley were leaving room \"He's wanting to rest\". They shared how well he's doing today compared to yesterday. José Handley shared he fed himself breakfast, urine catheter was removed and he has less anxiety. They themselves seemed relieved and happy patient is doing well. We briefly visited with patient after they left. He was sitting up in bed with eyes open. He shared \"feeling better\". He requested for window blinds to be shut; we assisted with. We left him to rest.     Wife shared  had recently completed some documents to state how he would want his post death arrangements. She also thinks he has written down regarding end of life wishes but unsure. He wouldn't want long-term life support \"he doesn't want to live on machines\". Patient would benefit from completing AMD. Will introduce at later date for possible completion.      Kym Guan, MSW  Palliative

## 2019-07-09 NOTE — PROGRESS NOTES
Hospitalist Progress Note    Patient: Chivo Biswas MRN: [id number]  [facility]: [id number]    YOB: 1945  Age: 74 y.o. Sex: male    DOA: 7/6/2019 LOS:  LOS: 3 days                Assessment/Plan     Patient Active Problem List   Diagnosis Code    COPD (chronic obstructive pulmonary disease) ([facility] [facility] J44.9    Hypertension I10    Tobacco abuse Z72.0    COPD (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 (HCC) J44.9    Chronic renal disease, stage 3, moderately decreased glomerular filtration rate between 30-59 mL/min/1.73 square meter (HCC) N18.3    COPD exacerbation ([address] J44.1    Asbestosis ([address] J61    Acute exacerbation of chronic obstructive pulmonary disease (COPD) ([address] J44.1    Pneumonia J18.9    COPD with acute exacerbation ([address] J44.1    Acute respiratory failure with hypoxia ([address] J96.01    Advanced care planning/counseling discussion Z70.80   [facility] Debility R50.80            75 y/o male with COPD (not on chronic oxygen) who is admitted for acute COPD exacerbation in the setting on RLL PNA. He was started on BiPAP, however his respiratory status worse and he required to be intubated. Extubated 07/08/2019, awake and alert, denies any complains. On oxygen by NC.      CRITICAL CARE PLAN    Resp -   Acute respiratory failure - extubated 07/08/2019, BiPAP as needed. Acute exacerbation of COPD - on solumedrol, pulmicort and duo neb treatment. CTA chest with no evidence of PE  LLL consolidation vs atelectasis  Chronic peripherally calcified right pleural effusion. ID - blood cultures no growth to date. RLL pneumonia   ANTIBIOTICS - vancomycin, zosyn, levaquin. CVS - Monitor HD. Cardiac enzymes negative    Heme/onc - Follow H&H, plts. .    Renal - Trend BUN, Cr, follow I/O. Check and replace Mg, K, phos. Endocrine -  Follow FSG  DM - on SSI    GI - SLP eval and diet per SLP.     Prophylaxis - DVT: heparin, GI: protonix    Transfer to floor    Disposition : 2-3 days    Review of systems  General: No fevers or chills. Cardiovascular: No chest pain or pressure. No palpitations. Pulmonary: see above   Gastrointestinal: No nausea, vomiting. Physical Exam:  General: Awake, cooperative, no acute distress    HEENT: NC, Atraumatic. PERRLA, anicteric sclerae. Lungs: Decreased breath sounds bilaterally, crackles lower lungs bilaterally. Heart:  S1 S2,  No murmur, No Rubs, No Gallops  Abdomen: Soft, Non distended, Non tender.  +Bowel sounds,   Extremities: No c/c/e  Psych:   Not anxious or agitated. Neurologic:  No acute neurological deficit. Vital signs/Intake and Output:  Visit Vitals  /71   Pulse 80   Temp 98.6 °F (37 °C)   Resp 23   Ht 5' 8\" (1.727 m)   Wt 83.9 kg (185 lb)   SpO2 97%   BMI 28.13 kg/m²     Current Shift:  No intake/output data recorded.   Last three shifts:  07/07 1901 - 07/09 0700  In: 2768.8 [I.V.:2768.8]  Out: 5861 [Urine:3875]            Labs: Results:       Chemistry Recent Labs     07/09/19 0520 07/08/19  2350 07/08/19  1800 07/08/19  0320 07/07/19 0225   *  --   --  148* 146*     --   --  137 132*   K 4.3 3.9 3.5 3.1* 4.3     --   --  106 99*   CO2 26  --   --  22 22   BUN 26*  --   --  22* 25*   CREA 1.26  --   --  1.11 1.35*   CA 8.1*  --   --  8.2* 8.7   AGAP 8  --   --  9 11   BUCR 21*  --   --  20 19      CBC w/Diff Recent Labs     07/09/19  0520 07/08/19  0320 07/07/19  0225   WBC 7.6 9.9 7.4   RBC 3.92* 4.03* 4.21*   HGB 10.9* 11.2* 11.8*   HCT 32.7* 33.2* 34.3*    246 327   GRANS 94*  --   --    LYMPH 3*  --   --    EOS 0  --   --       Cardiac Enzymes Recent Labs     07/07/19  0400      CKND1 6.9*      Coagulation Recent Labs     07/09/19  0520 07/08/19  0320   PTP 12.3 12.2   INR 0.9 0.9       Lipid Panel No results found for: CHOL, CHOLPOCT, CHOLX, CHLST, CHOLV, 091336, HDL, LDL, LDLC, DLDLP, 774661, VLDLC, VLDL, TGLX, TRIGL, TRIGP, TGLPOCT, CHHD, CHHDX   BNP No results for input(s): BNPP in the last 72 hours. Liver Enzymes No results for input(s): TP, ALB, TBIL, AP, SGOT, GPT in the last 72 hours.     No lab exists for component: DBIL   Thyroid Studies No results found for: T4, T3U, TSH, TSHEXT, TSHEXT     Procedures/imaging: see electronic medical records for all procedures/Xrays and details which were not copied into this note but were reviewed prior to creation of Plan

## 2019-07-09 NOTE — PROGRESS NOTES
[facility]  Pulmonary, Critical Care, and Sleep Medicine    PCCM Note    Name: Price Lambert MRN: [id number]   : 1945 Hospital: [facility] [place]   Date: 2019  Room: [facility]/     Subjective:   Patient is a 74 y.o. 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 Patient was admitted yesterday with COPD exacerbation to tele floor. He seemed to have worsened overnight, failed BIPAP and was subsequently intubated. 19   Patient in icu, extubated yesterday, tolerated well, on O2 via NC  Improving cough and SOB  Denies chest pain, wheezing or hemoptysis. Hemodynamically stable. Afberile  Tolerating PO diet  Followed with Dr. Kylee Lerma in our office before  Reports quit smoking few months ago  I was not contacted by staff on anything about patient overnight    Review of Systems   General ROS: negative for  - fever, chills, weight loss, fatigue and malaise  Cardiovascular ROS: negative for - chest pain, edema, murmur, orthopnea, palpitations or pnd  Gastrointestinal ROS: no nausea, vomiting, abdominal pain, change in bowel habits, or black or bloody stools  Dermatological ROS: negative for - pruritus, rash or skin lesion changes       Past Medical History:   Diagnosis Date    Cancer [address] [address]     prostate    COPD (chronic obstructive pulmonary disease) [address] [address]     Hypertension     Pneumonia     Radiation effect       Past Surgical History:   Procedure Laterality Date    HX HERNIA REPAIR        Prior to Admission medications    Medication Sig Start Date End Date Taking? Authorizing Provider   albuterol (PROVENTIL VENTOLIN) 2.5 mg /3 mL (0.083 %) nebulizer solution 3 mL by Nebulization route every two (2) hours as needed for Wheezing. 19   Anthony Clemens, DO   albuterol-ipratropium (DUO-NEB) 2.5 mg-0.5 mg/3 ml nebu 3 mL by Nebulization route every four (4) hours as needed. 2/9/19   Layla Bell,    chlorpheniramine-HYDROcodone (TUSSIONEX) 10-8 mg/5 mL suspension Take 5 mL by mouth every twelve (12) hours as needed for Cough. Max Daily Amount: 10 mL. 2/9/19   Layla Bell, DO   predniSONE (DELTASONE) 20 mg tablet 2 tabs daily x 3 days then 1 tab daily x 3 days then 1/2 tab daily x 3 days 2/9/19   Layla Bell, DO   fluticasone furoate (ARNUITY ELLIPTA) 100 mcg/actuation dsdv inhaler Take 1 Puff by inhalation daily. 10/24/18   Linda Malone PA-C   acetaminophen (TYLENOL ARTHRITIS PAIN) 650 mg TbER Take 1 Tab by mouth every eight (8) hours. 9/8/18   Miley TREJO PA-C   furosemide (LASIX) 20 mg tablet Take 20 mg by mouth daily. Blayne Bran MD   albuterol (PROVENTIL HFA, VENTOLIN HFA, PROAIR HFA) 90 mcg/actuation inhaler Take 2 Puffs by inhalation every four (4) hours as needed for Wheezing or Shortness of Breath. 4/23/18   Linda Malone PA-C   ipratropium (ATROVENT) 0.03 % nasal spray 2 Sprays every twelve (12) hours. Blayne Bran MD   tamsulosin (FLOMAX) 0.4 mg capsule Take 0.4 mg by mouth daily. Blayne Bran MD   albuterol (PROVENTIL VENTOLIN) 2.5 mg /3 mL (0.083 %) nebulizer solution 3 mL by Nebulization route every four (4) hours as needed for Wheezing. 12/23/15   TRINA Dickinson   chlorthalidone (HYGROTEN) 25 mg tablet Take  by mouth daily.     Blayne Bran MD   Nebulizer & Compressor machine Dispense: 1 nebulizer machine w all tubing necessary 10/6/14   Layla Bell DO     Allergies   Allergen Reactions    Aspirin Other (comments)     \"messes my stomach up\"      Social History     Tobacco Use    Smoking status: Former Smoker     Types: Cigarettes    Smokeless tobacco: Never Used   Substance Use Topics    Alcohol use: No      Family History   Problem Relation Age of Onset    Heart Disease Mother         Current Facility-Administered Medications   Medication Dose Route Frequency    insulin lispro (HUMALOG) injection   SubCUTAneous AC&HS    methylPREDNISolone (PF) (Solu-MEDROL) injection 40 mg  40 mg IntraVENous Q6H    Vancomycin- Pharmacy to dose  1 Each Other Rx Dosing/Monitoring    vancomycin (VANCOCIN) 1250 mg in  ml infusion  1,250 mg IntraVENous Q18H    budesonide (PULMICORT) 500 mcg/2 ml nebulizer suspension  500 mcg Nebulization BID RT    albuterol-ipratropium (DUO-NEB) 2.5 MG-0.5 MG/3 ML  3 mL Nebulization Q4H RT    piperacillin-tazobactam (ZOSYN) 3.375 g in 0.9% sodium chloride (MBP/ADV) 100 mL MBP  3.375 g IntraVENous Q8H    levoFLOXacin (LEVAQUIN) 750 mg in D5W IVPB  750 mg IntraVENous Q24H    heparin (porcine) injection 5,000 Units  5,000 Units SubCUTAneous Q8H    acetaminophen (TYLENOL) tablet 650 mg  650 mg Oral Q8H    doxazosin (CARDURA) tablet 1 mg  1 mg Oral DAILY    pantoprazole (PROTONIX) 40 mg in sodium chloride 0.9% 10 mL injection  40 mg IntraVENous DAILY       Latest lactic acid:   Lactic acid   Date Value Ref Range Status   2019 1.4 0.4 - 2.0 MMOL/L Final   2018 0.9 0.4 - 2.0 MMOL/L Final   2018 1.6 0.4 - 2.0 MMOL/L Final       Objective:   Vital Signs:    Visit Vitals  /71   Pulse 80   Temp 98.6 °F (37 °C)   Resp 23   Ht 5' 8\" (1.727 m)   Wt 83.9 kg (185 lb)   SpO2 97%   BMI 28.13 kg/m²       O2 Device: Nasal cannula   O2 Flow Rate (L/min): 1 l/min   Temp (24hrs), Av.1 °F (36.7 °C), Min:97.8 °F (36.6 °C), Max:98.6 °F (37 °C)       Intake/Output:   Last shift:      No intake/output data recorded.   Last 3 shifts:  1901 -  0700  In: 2768.8 [I.V.:2768.8]  Out: 2727 [Urine:3875]    Intake/Output Summary (Last 24 hours) at 2019 0851  Last data filed at 2019 0650  Gross per 24 hour   Intake 716.35 ml   Output 2725 ml   Net -2008.65 ml         Physical Exam:   Gene: comfortable; awake, alert, Ox 3, on O2 via NC  HEENT: pupils not dilated, reactive, no scleral jaundice, moist oral mucosa, no nasal drainage  Neck: No adenopathy or thyroid swelling  CVS: S1S2 no murmurs; JVD not elevated  RS: Mod to poor air entry bilaterally, symmetrical BS; no bilateral expiratory wheezes, mild bi-basal crackles  Abd: soft, non tender, no hepatosplenomegaly, no abd distension, no guarding or rigidity, bowel sounds heard  Neuro: AAO x 3, non-focal exam  Extrm: no leg edema or swelling or clubbing  Skin: no rash  Lymphatic: no cervical or supraclavicular adenopathy    Telemetry: NSR    Data review:     Recent Results (from the past 24 hour(s))   POC G3    Collection Time: 07/08/19  9:30 AM   Result Value Ref Range    Device: VENT      FIO2 (POC) 30 %    pH (POC) 7.364 7.35 - 7.45      pCO2 (POC) 33.8 (L) 35.0 - 45.0 MMHG    pO2 (POC) 102 (H) 80 - 100 MMHG    HCO3 (POC) 19.2 (L) 22 - 26 MMOL/L    sO2 (POC) 98 (H) 92 - 97 %    Base deficit (POC) 6 mmol/L    Mode CPAP/SPON      PEEP/CPAP (POC) 5.0 cmH2O    Pressure support 7 cmH2O    Allens test (POC) YES      Total resp. rate 29      Site LEFT RADIAL      Patient temp.  98.6      Specimen type (POC) ARTERIAL      Performed by Jasmin Hernández    GLUCOSE, POC    Collection Time: 07/08/19 12:05 PM   Result Value Ref Range    Glucose (POC) 118 (H) 70 - 110 mg/dL   GLUCOSE, POC    Collection Time: 07/08/19  4:08 PM   Result Value Ref Range    Glucose (POC) 138 (H) 70 - 110 mg/dL   POTASSIUM    Collection Time: 07/08/19  6:00 PM   Result Value Ref Range    Potassium 3.5 3.5 - 5.5 mmol/L   GLUCOSE, POC    Collection Time: 07/08/19  9:39 PM   Result Value Ref Range    Glucose (POC) 145 (H) 70 - 110 mg/dL   POTASSIUM    Collection Time: 07/08/19 11:50 PM   Result Value Ref Range    Potassium 3.9 3.5 - 5.5 mmol/L   PROTHROMBIN TIME + INR    Collection Time: 07/09/19  5:20 AM   Result Value Ref Range    Prothrombin time 12.3 11.5 - 15.2 sec    INR 0.9 0.8 - 1.2     METABOLIC PANEL, BASIC    Collection Time: 07/09/19  5:20 AM   Result Value Ref Range    Sodium 138 136 - 145 mmol/L    Potassium 4.3 3.5 - 5.5 mmol/L    Chloride 104 100 - 108 mmol/L    CO2 26 21 - 32 mmol/L    Anion gap 8 3.0 - 18 mmol/L    Glucose 140 (H) 74 - 99 mg/dL    BUN 26 (H) 7.0 - 18 MG/DL    Creatinine 1.26 0.6 - 1.3 MG/DL    BUN/Creatinine ratio 21 (H) 12 - 20      GFR est AA >60 >60 ml/min/1.73m2    GFR est non-AA 56 (L) >60 ml/min/1.73m2    Calcium 8.1 (L) 8.5 - 10.1 MG/DL   CBC WITH AUTOMATED DIFF    Collection Time: 07/09/19  5:20 AM   Result Value Ref Range    WBC 7.6 4.6 - 13.2 K/uL    RBC 3.92 (L) 4.70 - 5.50 M/uL    HGB 10.9 (L) 13.0 - 16.0 g/dL    HCT 32.7 (L) 36.0 - 48.0 %    MCV 83.4 74.0 - 97.0 FL    MCH 27.8 24.0 - 34.0 PG    MCHC 33.3 31.0 - 37.0 g/dL    RDW 14.6 (H) 11.6 - 14.5 %    PLATELET 349 927 - 171 K/uL    MPV 8.4 (L) 9.2 - 11.8 FL    NEUTROPHILS 94 (H) 40 - 73 %    LYMPHOCYTES 3 (L) 21 - 52 %    MONOCYTES 3 3 - 10 %    EOSINOPHILS 0 0 - 5 %    BASOPHILS 0 0 - 2 %    ABS. NEUTROPHILS 7.1 1.8 - 8.0 K/UL    ABS. LYMPHOCYTES 0.2 (L) 0.9 - 3.6 K/UL    ABS. MONOCYTES 0.3 0.05 - 1.2 K/UL    ABS. EOSINOPHILS 0.0 0.0 - 0.4 K/UL    ABS. BASOPHILS 0.0 0.0 - 0.1 K/UL    DF AUTOMATED     MAGNESIUM    Collection Time: 07/09/19  5:20 AM   Result Value Ref Range    Magnesium 2.1 1.6 - 2.6 mg/dL   GLUCOSE, POC    Collection Time: 07/09/19  7:11 AM   Result Value Ref Range    Glucose (POC) 134 (H) 70 - 110 mg/dL           Recent Labs     07/08/19  0930 07/08/19  0508 07/07/19  0728   FIO2I 30 0.30 50   HCO3I 19.2* 20.9* 19.5*   PCO2I 33.8* 36.3 44.3   PHI 7.364 7.365 7.252*   PO2I 102* 85 101*     EKG  Diagnosis   Final   Sinus tachycardia   Otherwise normal ECG   When compared with ECG of 08-FEB-2019 12:15,   aberrant conduction is no longer present   T wave inversion no longer evident in Anterior leads   Confirmed by Katie Pickard MD, -- ([id number]) on 7/6/2019 11:48:08 AM      Echo March 2017  SUMMARY:  Left ventricle: Systolic function was normal by visual assessment. Ejection fraction was estimated in the range of 55 % to 60 %. Poor resolution of endocardial border.  Doppler parameters were consistent with abnormal left ventricular relaxation (grade 1 diastolic dysfunction). COMPARISONS:  Comparison was made with the previous study of 03-Oct-2014. All Micro Results     Procedure Component Value Units Date/Time    CULTURE, BLOOD [[id number]] Collected:  07/06/19 0742    Order Status:  Completed Specimen:  Whole Blood Updated:  07/09/19 0704     Special Requests: NO SPECIAL REQUESTS        Culture result: NO GROWTH 3 DAYS       CULTURE, BLOOD [[id number]] Collected:  07/07/19 0400    Order Status:  Completed Specimen:  Blood Updated:  07/09/19 0704     Special Requests: NO SPECIAL REQUESTS        Culture result: NO GROWTH 2 DAYS       CULTURE, BLOOD [[id number]] Collected:  07/06/19 0700    Order Status:  Completed Specimen:  Whole Blood Updated:  07/09/19 0704     Special Requests: NO SPECIAL REQUESTS        Culture result: NO GROWTH 3 DAYS       CULTURE, RESPIRATORY/SPUTUM/BRONCH Eric Shingles STAIN [[id number]] Collected:  07/07/19 1050    Order Status:  Completed Specimen:  Sputum from Tracheal Aspirate Updated:  07/08/19 1122     Special Requests: NO SPECIAL REQUESTS        GRAM STAIN >25 WBC/lpf         <10 EPI/lpf         MUCUS PRESENT         NO ORGANISMS SEEN        Culture result:       CULTURE IN PROGRESS,FURTHER UPDATES TO FOLLOW          CULTURE, BLOOD [[id number]] Collected:  07/07/19 0400    Order Status:  Canceled             Imaging:  [x]I have personally reviewed the patients chest radiographs images and report   CXR port 7/8/19  1. Endotracheal tube, left IJ central venous catheter, and visualized nasogastric tube as above.   2. Left retrocardiac atelectasis or airspace disease similar to preceding CT exam.  3. Unchanged right-sided pleural-based calcifications and peripherally calcified chronic right pleural effusion      Results from Hospital Encounter encounter on 07/06/19   XR ABD (KUB)    Narrative EXAM: KUB    INDICATION: OG tube placement    COMPARISON: CTA chest done earlier today    _______________    FINDINGS:    Portable supine abdominal film was performed. NG tube extends well into the  stomach. It was in the distal esophagus on CTA chest done earlier today. Nonobstructive bowel gas pattern.    _______________      Impression IMPRESSION:      1. Nasogastric tube good position. Results from Hospital Encounter encounter on 07/06/19   CTA CHEST W OR W WO CONT    Narrative EXAM: CTA chest    INDICATION: Pneumonia with COPD exacerbation. Intubated    COMPARISON: CT chest 10/20/2018. AP portable chest done earlier today. TECHNIQUE: Axial CT imaging from the thoracic inlet through the diaphragm with  intravenous contrast. Coronal and sagittal MIP reformats were generated. One or more dose reduction techniques were used on this CT: automated exposure  control, adjustment of the mAs and/or kVp according to patient size, and  iterative reconstruction techniques. The specific techniques used on this CT  exam have been documented in the patient's electronic medical record. Digital  Imaging and Communications in Medicine (DICOM) format image data are available  to nonaffiliated external healthcare facilities or entities on a secure, media  free, reciprocally searchable basis with patient authorization for at least a  12-month period after this study. _______________    FINDINGS:    EXAM QUALITY: Overall exam quality is diagnostic. PULMONARY ARTERIES: No evidence of pulmonary embolism. MEDIASTINUM: Heart size is normal. No pericardial effusion. Aorta is normal in  caliber. NG tube is present. Tip extends down to the GE junction but not  definitely into the stomach. LUNGS: There is dense consolidation of the left lower lobe. There is a new  finding compared to prior CT and is not apparent on AP portable chest film done  earlier today.  Chronic atelectasis versus scarring posterior right lower lobe is  stable as is the subpleural anterior peripherally calcified mass in the right  upper lobe. PLEURA: Chronic right pleural effusion with peripheral calcification is stable  compared to prior CT.    AIRWAY: Endotracheal tube is present in good position terminating above the  juliana. LYMPH NODES: No enlarged nodes. UPPER ABDOMEN: Partially imaged left renal cyst. Upper abdominal structures are  otherwise unremarkable. OTHER: No acute or aggressive osseous abnormalities identified. _______________      Impression IMPRESSION:    1. No evidence of pulmonary embolism. 2.  New dense consolidation left lower lobe which could represent pneumonia or  atelectasis. 3. Chronic peripherally calcified right pleural effusion with adjacent  atelectasis or scarring. 4. NG tube traverses Dobbhoff feeding tube is present with the tip extending to  the GE junction but not definitely into the stomach. It should be advanced 8 to  10 cm for better positioning. Endotracheal tube good position.            IMPRESSION:   Principal Problem:    Acute respiratory failure with hypoxia     Pneumonia     COPD with acute exacerbation   Patient Active Problem List   Diagnosis Code    COPD (chronic obstructive pulmonary disease) ([facility] [address] J44.9    Hypertension I10    Tobacco abuse Z72.0    COPD (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 (HCC) J44.9    Chronic renal disease, stage 3, moderately decreased glomerular filtration rate between 30-59 mL/min/1.73 square meter (HCC) N18.3    COPD exacerbation ([address] J44.1    Asbestosis ([address] J61    Acute exacerbation of chronic obstructive pulmonary disease (COPD) ([address] J44.1    Pneumonia J18.9    COPD with acute exacerbation ([address] J44.1    Acute respiratory failure with hypoxia ([address] J96.01    Advanced care planning/counseling discussion Z71.89    Debility R53.81      RECOMMENDATIONS:   · Pulm: continue supplemental O2 via NC, titrate flow for goal SPO2 > 91%  · CTA chest negative for PE  · Budesonide neb; Duonebs; IV steroids- taper  · Aspiration prevention measures, HOB 30', pulmonary hygiene care  · Cardiac: Trops neg; hemodynamically stable  · ID: resp cxs in process; broad spectrum antibiotics - levaquin, zosyn and iv vanc; narrow Ab based on cxs and clinical/radiological improvement in next 24-48 hrs  · Renal: monitor UOP and renal fn; patient being hydrated  · GI: Diet as tolerated  · Hem: monitor Hb and Plts  · Endo: poc BG and SSI while on steroids  · Proph:  DVt and GI proph - sc heparin and PPI   · Update family when available - none at bedside  · Patient agrees to follow up again with DR. MC LOPEZ after DC home  · May tx to floor from pulmonary standpoint  Will defer respective systems problem management to primary and other consultant and follow patient with primary and other medical team  Further recommendations will be based on the patient's response to recommended treatment and results of the investigation ordered. Quality Care: PPI, DVT prophylaxis, HOB elevated, Infection control all reviewed and addressed.   PAIN AND SEDATION: none  · Skin/Wound: no active issues  · Nutrition: diet  · Prophylaxis: DVT and GI Prophylaxis reviewed  · PT/OT eval and treat: as needed  · Lines/Tubes: PIVs; all 7/7/19- ET tube and OG tube, Humphreys cath - removed 7/8/19  ADVANCE DIRECTIVE: Full Code  Discussed with RN, RT and staff     High complexity decision making was performed in this consultation and evaluation of this patient who is at high risk for decompensation with multiple organ involvement  Total critical care time spent rendering care exclusive of procedures: 33 minutes     Basim Goetz MD

## 2019-07-09 NOTE — PROGRESS NOTES
Bedside and Verbal shift change report received from A. Zadie Epley, RN (offgoing nurse). Report included the following information SBAR, Kardex, Intake/Output and MAR. 2030 Shift assessment completed, see EMR    0045 Reassessment completed, see EMR    0445 Reassessment completed, see EMR    Bedside and Verbal shift change report given to A. Zadie Epley, RN (oncoming nurse). Report included the following information SBAR, Kardex, Intake/Output, MAR and Recent Results.

## 2019-07-09 NOTE — DIABETES MGMT
GLYCEMIC CONTROL & NUTRITION:      - Discussed in rounds and chart reviewed, no known h/o DM  - Solumedrol 40 mg Q 6 hours  - continue current IP regimen   Recent Glucose Results:   Lab Results   Component Value Date/Time     (H) 07/09/2019 05:20 AM    GLUCPOC 155 (H) 07/09/2019 11:14 AM    GLUCPOC 134 (H) 07/09/2019 07:11 AM    GLUCPOC 145 (H) 07/08/2019 09:39 PM       Darron Cockayne MS, RN, CDE  Glycemic Control Team  [phone]  Pager [phone] (M-TH 8:00-4:30P)  *After Hours pager [phone]

## 2019-07-09 NOTE — PROGRESS NOTES
Summary -- Pt comfortable upon arrival to unit and throughout shift. Continue to c/o dyspnea with mild exertion. WDL on cardiac monitor and continuous pulse ox. No new concerns/complaints. Patient Vitals for the past 12 hrs:   Temp Pulse Resp BP SpO2   07/09/19 1559 -- -- -- -- 96 %   07/09/19 1534 -- -- -- -- 93 %   07/09/19 1442 98.9 °F (37.2 °C) 84 20 167/75 93 %   07/09/19 1235 -- -- -- -- 94 %   07/09/19 1225 98.5 °F (36.9 °C) 88 23 160/74 94 %   07/09/19 1102 99 °F (37.2 °C) -- -- -- --   07/09/19 1100 98.6 °F (37 °C) 86 23 156/81 98 %   07/09/19 1000 -- 84 24 153/64 96 %   07/09/19 0900 -- 96 29 165/78 95 %   07/09/19 0800 98.6 °F (37 °C) 83 22 (!) 146/97 98 %   07/09/19 0721 98.6 °F (37 °C) -- -- -- --     Bedside shift change report given to Nubia Rene RN (oncoming nurse) by Seun Cordero RN (offgoing nurse). Report included the following information SBAR, Kardex, ED Summary, Intake/Output, MAR and Recent Results.

## 2019-07-09 NOTE — PROGRESS NOTES
Pharmacy Dosing Services:  Zosyn Dosing ( Floor Patient )      Indication:  CAP - 7 day Tx    Previous Regimen Zosyn 3.375 gm IV over 4 hrs q8hrs ( ICU dosing )   Serum Creatinine Lab Results   Component Value Date/Time    Creatinine 1.26 07/09/2019 05:20 AM      Creatinine Clearance Estimated Creatinine Clearance: 53.5 mL/min (based on SCr of 1.26 mg/dL). BUN Lab Results   Component Value Date/Time    BUN 26 (H) 07/09/2019 05:20 AM         Dose administration notes:   Patient transferred to Floor, Changed to Zosyn 3.375 gm IV over 30 minutes q6hrs per dosing protocol    Plan:  Continue to monitor     Gustavo Jackson Day   [phone]

## 2019-07-09 NOTE — CONSULTS
[facility] [place]: [phone] ([phone])  Formerly [facility]: [phone]   [facility]: [phone]    Patient Name: Denys Juarez  YOB: 1945    Date of Initial Consult: 7/8/2019, follow up 7/9/2019   Reason for Consult: care decisions   Requesting Provider: Dr Trini Matta  Primary Care Physician: Chirag Contreras MD      SUMMARY:   Denys Juarez is a 74y.o. year old with a past history of prostate cancer, COPD, Asbestosis, hypertension, pneumonia  who was admitted on 7/6/2019 from home with a diagnosis of shortness of breath. Found to have RLL pneumonia and COPD acute exacerbation. He worsened over night, placed on BIPAP but failed and ultimately required oral intubation. He was able to be successfully extubated earlier today. Current medical issues leading to Palliative Medicine involvement include: 74year old male with COPD, asbestos, pleural disease who required intubation for respiratory support. Palliative medicine is consulted for support and goals of care discussion. 7/9/2019 transferred out of ICU, off oxygen, alert reports he is feeling much better. Calm today. PALLIATIVE DIAGNOSES:   1. Advanced care plan discussion   2. COPD   3. Shortness of breath / acute respiratory failure   4. Debility        PLAN:   1. 7/9/2019 follow up along with TAMI Hernadez. Mr John Johnson has been transferred out of ICU to [facility]. He reports doing much better. Maintaining oxygen saturations on room air. He feels less anxious today, but still tearful at times when  his family leaves. Spoke with his wife and granddaughter. Wife shares he has completed a \" document\" which denotes his medical wishes, she reports  it directs for no long term ventilator support. This is his first intubation of which he has little memory. Family aware his lungs are not healthy there may come a time in which if he is intubated weaning from the vent maybe difficult, .  Offered support to patient and family today. Goals of care not currently changed full code with full interventions. Discussed with wife and granddaughter encouraged  follow up with his PCP to  discuss options of anti depressant with anxiolytic properties if PCP, patient and family think appropriate and beneficial. Wife shares he seems to do well a short time after hospitalization, ER visits or PCP visits, then becomes highly anxious, short of breath and wants to return to ER. Difficult to tease out if at times shortness of breath is prompting anxiety or if anxiety prompting shortness of breath. We also highly encouraged o/p pulmonology follow up     ( below are previous conversations)     Advanced care plan discussion patient has reportedly competed an AMD but not in our system. Seen today along with TAMI Rubio and Ms Master's RN. We saw post extubation. He is alert and oriented, anxious but very pleasant gentleman. His wife and 2 grand children at bedside. . Patient is tearful saying he is worse this time \" I am afraid I will not make it\" Offered support to patient and wife who was also tearful. We did not discuss goals of care with Mr Priyanka Quevedo today as he is very anxious at time of our visit. We did speak with his wife and family who agreed with waiting until tomorrow and patient a bit more relaxed and less tearful. Current goals of care full code with full interventions. 2. COPD former tobacco user, not on chronic O2 at home. Patient reports he has been getting more short breath at home and it is very distressing to him. He feels his disease is progressing. 3. Shortness of breath maintaining good O2 sats on 2 liters post extubation. He tells us he currently does not feel short of breath. Prior to this event he has never been intubated   4. Debility enjoys foot ball. Tells us he has frequent hospitalizations but did not see in system, but feels his disease is progressive interfering with his ADL's.  His wife tells us he asks to come the hospital about 3x week because of SOB. 5. Initial consult note routed to primary continuity provider  6. Communicated plan of care with: Palliative IDT, RN, patient, wife, grand daughters      Patient/Health Care Proxy Stated Goals: Prolong life      TREATMENT PREFERENCES:   Code Status: Full Code    Advance Care Planning:  [] [facility] Interdisciplinary Team has updated the ACP Navigator with [address] and Patient Capacity    Primary Decision Maker ([address]): legal medical surrogate decision maker is his wife    Medical Interventions: Full interventions        Other:  As far as possible, the palliative care team has discussed with patient / health care proxy about goals of care / treatment preferences for patient. HISTORY:     History obtained from:  Patient and chart     CHIEF COMPLAINT: shortness of breath     HPI/SUBJECTIVE:    The patient is:   [x] Verbal and participatory  [] Non-participatory due to:   78year old male who presented to the ER with shortness of breath. Past medical history as above. He worsened overnight requiring intubation. He was able to be successfully extubated to nasal cannula. Goals of care not discussed today as patient very anxious.      Clinical Pain Assessment (nonverbal scale for nonverbal patients): Clinical Pain Assessment  Severity: 0     Activity (Movement): Lying quietly, normal position    Duration: for how long has pt been experiencing pain (e.g., 2 days, 1 month, years)  Frequency: how often pain is an issue (e.g., several times per day, once every few days, constant)     FUNCTIONAL ASSESSMENT:     Palliative Performance Scale (PPS):  PPS: 60    ECOG  ECOG Status : Ambulatory, but unable to carry out work activities     PSYCHOSOCIAL/SPIRITUAL SCREENING:      Any spiritual / Yarsani concerns:  [] Yes /  [x] No    Caregiver Burnout:  [x] Yes /  [] No /  [] No Caregiver Present      Anticipatory grief assessment:   [x] Normal  / [] Maladaptive        REVIEW OF SYSTEMS:     Positive and pertinent negative findings in ROS are noted above in HPI. The following systems were [x] reviewed / [] unable to be reviewed as noted in HPI  Other findings are noted below. Systems: constitutional, ears/nose/mouth/throat, respiratory, gastrointestinal, genitourinary, musculoskeletal, integumentary, neurologic, psychiatric, endocrine. Positive findings noted below. Modified ESAS Completed by: provider   Fatigue: 3 Drowsiness: 0     Pain: 0   Anxiety: 2 Nausea: 0   Anorexia: 0 Dyspnea: 0           Stool Occurrence(s): 0        PHYSICAL EXAM:     Wt Readings from Last 3 Encounters:   07/06/19 83.9 kg (185 lb)   02/09/19 91.4 kg (201 lb 8 oz)   11/23/18 93 kg (205 lb)     Blood pressure 167/75, pulse 84, temperature 98.9 °F (37.2 °C), resp. rate 20, height 5' 8\" (1.727 m), weight 83.9 kg (185 lb), SpO2 93 %.   Pain:  Pain Scale 1: Numeric (0 - 10)  Pain Intensity 1: 0              Pain Intervention(s) 1: Medication (see MAR), MD notified (comment), Relaxation technique, Therapeutic presence, Rest  Last bowel movement: none recorded    Constitutional: well developed male who is resting in bed, smiling comfortable appearing in NAD   Eyes: pupils equal, anicteric  ENMT: no nasal discharge, moist mucous membranes  Respiratory: breathing not currently labored few wheezes notes   Skin: warm, dry  Neurologic: alert oriented x 3 , talkative   Psychiatric: full affect, no hallucinations         HISTORY:     Principal Problem:    Pneumonia (7/6/2019)    Active Problems:    COPD with acute exacerbation ([address] (7/6/2019)      Acute respiratory failure with hypoxia ([address] (7/7/2019)      Advanced care planning/counseling discussion (7/8/2019)      Debility (7/8/2019)      Past Medical History:   Diagnosis Date    Cancer ([address]     prostate    COPD (chronic obstructive pulmonary disease) ([address]     Hypertension     Pneumonia     Radiation effect       Past Surgical History: Procedure Laterality Date    HX HERNIA REPAIR        Family History   Problem Relation Age of Onset    Heart Disease Mother      History reviewed, no pertinent family history.   Social History     Tobacco Use    Smoking status: Former Smoker     Types: Cigarettes    Smokeless tobacco: Never Used   Substance Use Topics    Alcohol use: No     Allergies   Allergen Reactions    Aspirin Other (comments)     \"messes my stomach up\"      Current Facility-Administered Medications   Medication Dose Route Frequency    methylPREDNISolone (PF) (Solu-MEDROL) injection 40 mg  40 mg IntraVENous Q6H    ELECTROLYTE REPLACEMENT PROTOCOL - Potassium Standard Dosing   1 Each Other PRN    ELECTROLYTE REPLACEMENT PROTOCOL - Magnesium   1 Each Other PRN    polyvinyl alcohol (LIQUIFILM TEARS) 1.4 % ophthalmic solution 1 Drop  1 Drop Both Eyes PRN    Vancomycin- Pharmacy to dose  1 Each Other Rx Dosing/Monitoring    vancomycin (VANCOCIN) 1250 mg in  ml infusion  1,250 mg IntraVENous Q18H    budesonide (PULMICORT) 500 mcg/2 ml nebulizer suspension  500 mcg Nebulization BID RT    albuterol-ipratropium (DUO-NEB) 2.5 MG-0.5 MG/3 ML  3 mL Nebulization Q4H RT    glucose chewable tablet 16 g  4 Tab Oral PRN    glucagon (GLUCAGEN) injection 1 mg  1 mg IntraMUSCular PRN    dextrose 10% infusion 100 mL  100 mL IntraVENous PRN    piperacillin-tazobactam (ZOSYN) 3.375 g in 0.9% sodium chloride (MBP/ADV) 100 mL MBP  3.375 g IntraVENous Q8H    levoFLOXacin (LEVAQUIN) 750 mg in D5W IVPB  750 mg IntraVENous Q24H    heparin (porcine) injection 5,000 Units  5,000 Units SubCUTAneous Q8H    acetaminophen (TYLENOL) tablet 650 mg  650 mg Oral Q8H    doxazosin (CARDURA) tablet 1 mg  1 mg Oral DAILY    pantoprazole (PROTONIX) 40 mg in sodium chloride 0.9% 10 mL injection  40 mg IntraVENous DAILY    albuterol (PROVENTIL VENTOLIN) nebulizer solution 2.5 mg  2.5 mg Nebulization Q4H PRN        LAB AND IMAGING FINDINGS:     Lab Results Component Value Date/Time    WBC 7.6 07/09/2019 05:20 AM    HGB 10.9 (L) 07/09/2019 05:20 AM    PLATELET 789 24/08/9826 05:20 AM     Lab Results   Component Value Date/Time    Sodium 138 07/09/2019 05:20 AM    Potassium 4.3 07/09/2019 05:20 AM    Chloride 104 07/09/2019 05:20 AM    CO2 26 07/09/2019 05:20 AM    BUN 26 (H) 07/09/2019 05:20 AM    Creatinine 1.26 07/09/2019 05:20 AM    Calcium 8.1 (L) 07/09/2019 05:20 AM    Magnesium 2.1 07/09/2019 05:20 AM    Phosphorus 4.2 02/08/2019 08:41 PM      Lab Results   Component Value Date/Time    AST (SGOT) 12 (L) 07/06/2019 05:46 AM    Alk. phosphatase 87 07/06/2019 05:46 AM    Protein, total 6.1 (L) 07/06/2019 05:46 AM    Albumin 3.2 (L) 07/06/2019 05:46 AM    Globulin 2.9 07/06/2019 05:46 AM     Lab Results   Component Value Date/Time    INR 0.9 07/09/2019 05:20 AM    Prothrombin time 12.3 07/09/2019 05:20 AM    aPTT 30.5 10/02/2014 01:32 AM      No results found for: IRON, FE, TIBC, IBCT, PSAT, FERR   No results found for: PH, PCO2, PO2  No components found for: [place]   Lab Results   Component Value Date/Time     07/07/2019 04:00 AM    CK - MB 7.0 (H) 07/07/2019 04:00 AM              Total time: 15  minutes   Counseling / coordination time, spent as noted above: 10 minutes   > 50% counseling / coordination: yes with family, patient    Prolonged service was provided for  []30 min   []75 min in face to face time in the presence of the patient, spent as noted above. Time Start:   Time End:   Note: this can only be billed with 76823 (initial) or 95942 (follow up). If multiple start / stop times, list each separately.

## 2019-07-09 NOTE — ROUTINE PROCESS
TRANSFER - IN REPORT:    Verbal report received from Mateo Andrade, [facility] (name) on Rosanna Gray  being received from ICU (unit) for routine progression of care      Report consisted of patients Situation, Background, Assessment and   Recommendations(SBAR). Information from the following report(s) SBAR, Kardex, ED Summary, Procedure Summary, Intake/Output, MAR and Recent Results, cardiac rhythm NSR was reviewed with the receiving nurse. Opportunity for questions and clarification was provided. Assessment completed upon patients arrival to unit and care assumed.

## 2019-07-10 LAB
ANION GAP SERPL CALC-SCNC: 7 MMOL/L (ref 3–18)
ATRIAL RATE: 156 BPM
BASOPHILS # BLD: 0 K/UL (ref 0–0.1)
BASOPHILS NFR BLD: 0 % (ref 0–2)
BUN SERPL-MCNC: 29 MG/DL (ref 7–18)
BUN/CREAT SERPL: 26 (ref 12–20)
CALCIUM SERPL-MCNC: 8.1 MG/DL (ref 8.5–10.1)
CALCULATED R AXIS, ECG10: 66 DEGREES
CALCULATED T AXIS, ECG11: 46 DEGREES
CHLORIDE SERPL-SCNC: 103 MMOL/L (ref 100–108)
CK MB CFR SERPL CALC: 5 % (ref 0–4)
CK MB SERPL-MCNC: 2.2 NG/ML (ref 5–25)
CK SERPL-CCNC: 44 U/L (ref 39–308)
CO2 SERPL-SCNC: 27 MMOL/L (ref 21–32)
CREAT SERPL-MCNC: 1.13 MG/DL (ref 0.6–1.3)
DATE LAST DOSE: NORMAL
DIAGNOSIS, 93000: NORMAL
DIFFERENTIAL METHOD BLD: ABNORMAL
EOSINOPHIL # BLD: 0 K/UL (ref 0–0.4)
EOSINOPHIL NFR BLD: 0 % (ref 0–5)
ERYTHROCYTE [DISTWIDTH] IN BLOOD BY AUTOMATED COUNT: 14.4 % (ref 11.6–14.5)
GLUCOSE BLD STRIP.AUTO-MCNC: 122 MG/DL (ref 70–110)
GLUCOSE BLD STRIP.AUTO-MCNC: 138 MG/DL (ref 70–110)
GLUCOSE BLD STRIP.AUTO-MCNC: 139 MG/DL (ref 70–110)
GLUCOSE BLD STRIP.AUTO-MCNC: 141 MG/DL (ref 70–110)
GLUCOSE SERPL-MCNC: 132 MG/DL (ref 74–99)
HCT VFR BLD AUTO: 33.5 % (ref 36–48)
HGB BLD-MCNC: 11 G/DL (ref 13–16)
LYMPHOCYTES # BLD: 0.2 K/UL (ref 0.9–3.6)
LYMPHOCYTES NFR BLD: 4 % (ref 21–52)
MAGNESIUM SERPL-MCNC: 2.1 MG/DL (ref 1.6–2.6)
MCH RBC QN AUTO: 27.6 PG (ref 24–34)
MCHC RBC AUTO-ENTMCNC: 32.8 G/DL (ref 31–37)
MCV RBC AUTO: 84.2 FL (ref 74–97)
MONOCYTES # BLD: 0.2 K/UL (ref 0.05–1.2)
MONOCYTES NFR BLD: 4 % (ref 3–10)
NEUTS SEG # BLD: 4.8 K/UL (ref 1.8–8)
NEUTS SEG NFR BLD: 92 % (ref 40–73)
PLATELET # BLD AUTO: 247 K/UL (ref 135–420)
PLATELET COMMENTS,PCOM: ABNORMAL
PMV BLD AUTO: 8.6 FL (ref 9.2–11.8)
POTASSIUM SERPL-SCNC: 3.7 MMOL/L (ref 3.5–5.5)
Q-T INTERVAL, ECG07: 298 MS
QRS DURATION, ECG06: 84 MS
QTC CALCULATION (BEZET), ECG08: 438 MS
RBC # BLD AUTO: 3.98 M/UL (ref 4.7–5.5)
RBC MORPH BLD: ABNORMAL
REPORTED DOSE,DOSE: NORMAL UNITS
REPORTED DOSE/TIME,TMG: 1042
SODIUM SERPL-SCNC: 137 MMOL/L (ref 136–145)
TROPONIN I SERPL-MCNC: <0.02 NG/ML (ref 0–0.04)
VANCOMYCIN TROUGH SERPL-MCNC: 15.4 UG/ML (ref 10–20)
VENTRICULAR RATE, ECG03: 130 BPM
WBC # BLD AUTO: 5.2 K/UL (ref 4.6–13.2)

## 2019-07-10 PROCEDURE — C9113 INJ PANTOPRAZOLE SODIUM, VIA: HCPCS | Performed by: FAMILY MEDICINE

## 2019-07-10 PROCEDURE — 74011000250 HC RX REV CODE- 250: Performed by: FAMILY MEDICINE

## 2019-07-10 PROCEDURE — 80048 BASIC METABOLIC PNL TOTAL CA: CPT

## 2019-07-10 PROCEDURE — 74011250636 HC RX REV CODE- 250/636: Performed by: INTERNAL MEDICINE

## 2019-07-10 PROCEDURE — 36415 COLL VENOUS BLD VENIPUNCTURE: CPT

## 2019-07-10 PROCEDURE — 80202 ASSAY OF VANCOMYCIN: CPT

## 2019-07-10 PROCEDURE — 94640 AIRWAY INHALATION TREATMENT: CPT

## 2019-07-10 PROCEDURE — 74011000250 HC RX REV CODE- 250: Performed by: HOSPITALIST

## 2019-07-10 PROCEDURE — 74011250637 HC RX REV CODE- 250/637: Performed by: FAMILY MEDICINE

## 2019-07-10 PROCEDURE — 97162 PT EVAL MOD COMPLEX 30 MIN: CPT

## 2019-07-10 PROCEDURE — 82962 GLUCOSE BLOOD TEST: CPT

## 2019-07-10 PROCEDURE — 74011250636 HC RX REV CODE- 250/636: Performed by: FAMILY MEDICINE

## 2019-07-10 PROCEDURE — 83735 ASSAY OF MAGNESIUM: CPT

## 2019-07-10 PROCEDURE — 94760 N-INVAS EAR/PLS OXIMETRY 1: CPT

## 2019-07-10 PROCEDURE — 85025 COMPLETE CBC W/AUTO DIFF WBC: CPT

## 2019-07-10 PROCEDURE — 74011250637 HC RX REV CODE- 250/637: Performed by: INTERNAL MEDICINE

## 2019-07-10 PROCEDURE — 93005 ELECTROCARDIOGRAM TRACING: CPT

## 2019-07-10 PROCEDURE — 82550 ASSAY OF CK (CPK): CPT

## 2019-07-10 PROCEDURE — 74011000258 HC RX REV CODE- 258: Performed by: FAMILY MEDICINE

## 2019-07-10 PROCEDURE — 92526 ORAL FUNCTION THERAPY: CPT

## 2019-07-10 PROCEDURE — 97167 OT EVAL HIGH COMPLEX 60 MIN: CPT

## 2019-07-10 PROCEDURE — 74011250637 HC RX REV CODE- 250/637: Performed by: HOSPITALIST

## 2019-07-10 PROCEDURE — 65660000000 HC RM CCU STEPDOWN

## 2019-07-10 RX ORDER — ENOXAPARIN SODIUM 100 MG/ML
1 INJECTION SUBCUTANEOUS EVERY 12 HOURS
Status: DISCONTINUED | OUTPATIENT
Start: 2019-07-10 | End: 2019-07-12 | Stop reason: HOSPADM

## 2019-07-10 RX ORDER — POTASSIUM CHLORIDE 20 MEQ/1
40 TABLET, EXTENDED RELEASE ORAL
Status: COMPLETED | OUTPATIENT
Start: 2019-07-10 | End: 2019-07-10

## 2019-07-10 RX ORDER — DILTIAZEM HYDROCHLORIDE 5 MG/ML
10 INJECTION INTRAVENOUS ONCE
Status: COMPLETED | OUTPATIENT
Start: 2019-07-10 | End: 2019-07-10

## 2019-07-10 RX ORDER — METOPROLOL TARTRATE 25 MG/1
25 TABLET, FILM COATED ORAL EVERY 12 HOURS
Status: DISCONTINUED | OUTPATIENT
Start: 2019-07-10 | End: 2019-07-12 | Stop reason: HOSPADM

## 2019-07-10 RX ADMIN — IPRATROPIUM BROMIDE AND ALBUTEROL SULFATE 3 ML: .5; 3 SOLUTION RESPIRATORY (INHALATION) at 07:04

## 2019-07-10 RX ADMIN — BUDESONIDE 500 MCG: 0.5 INHALANT RESPIRATORY (INHALATION) at 20:30

## 2019-07-10 RX ADMIN — DILTIAZEM HYDROCHLORIDE 10 MG: 5 INJECTION INTRAVENOUS at 18:09

## 2019-07-10 RX ADMIN — PIPERACILLIN SODIUM,TAZOBACTAM SODIUM 3.38 G: 3; .375 INJECTION, POWDER, FOR SOLUTION INTRAVENOUS at 06:35

## 2019-07-10 RX ADMIN — METHYLPREDNISOLONE SODIUM SUCCINATE 40 MG: 125 INJECTION, POWDER, FOR SOLUTION INTRAMUSCULAR; INTRAVENOUS at 06:36

## 2019-07-10 RX ADMIN — BUDESONIDE 500 MCG: 0.5 INHALANT RESPIRATORY (INHALATION) at 07:04

## 2019-07-10 RX ADMIN — DOXAZOSIN 1 MG: 1 TABLET ORAL at 10:29

## 2019-07-10 RX ADMIN — ACETAMINOPHEN 650 MG: 325 TABLET ORAL at 00:52

## 2019-07-10 RX ADMIN — PIPERACILLIN SODIUM,TAZOBACTAM SODIUM 3.38 G: 3; .375 INJECTION, POWDER, FOR SOLUTION INTRAVENOUS at 00:50

## 2019-07-10 RX ADMIN — METHYLPREDNISOLONE SODIUM SUCCINATE 40 MG: 125 INJECTION, POWDER, FOR SOLUTION INTRAMUSCULAR; INTRAVENOUS at 22:06

## 2019-07-10 RX ADMIN — METHYLPREDNISOLONE SODIUM SUCCINATE 40 MG: 125 INJECTION, POWDER, FOR SOLUTION INTRAMUSCULAR; INTRAVENOUS at 12:13

## 2019-07-10 RX ADMIN — IPRATROPIUM BROMIDE AND ALBUTEROL SULFATE 3 ML: .5; 3 SOLUTION RESPIRATORY (INHALATION) at 11:00

## 2019-07-10 RX ADMIN — ENOXAPARIN SODIUM 90 MG: 100 INJECTION SUBCUTANEOUS at 22:05

## 2019-07-10 RX ADMIN — VANCOMYCIN HYDROCHLORIDE 1250 MG: 10 INJECTION, POWDER, LYOPHILIZED, FOR SOLUTION INTRAVENOUS at 07:44

## 2019-07-10 RX ADMIN — IPRATROPIUM BROMIDE AND ALBUTEROL SULFATE 3 ML: .5; 3 SOLUTION RESPIRATORY (INHALATION) at 04:35

## 2019-07-10 RX ADMIN — METHYLPREDNISOLONE SODIUM SUCCINATE 40 MG: 125 INJECTION, POWDER, FOR SOLUTION INTRAMUSCULAR; INTRAVENOUS at 00:51

## 2019-07-10 RX ADMIN — IPRATROPIUM BROMIDE AND ALBUTEROL SULFATE 3 ML: .5; 3 SOLUTION RESPIRATORY (INHALATION) at 00:17

## 2019-07-10 RX ADMIN — HEPARIN SODIUM 5000 UNITS: 5000 INJECTION INTRAVENOUS; SUBCUTANEOUS at 00:52

## 2019-07-10 RX ADMIN — SODIUM CHLORIDE 40 MG: 9 INJECTION INTRAMUSCULAR; INTRAVENOUS; SUBCUTANEOUS at 10:28

## 2019-07-10 RX ADMIN — IPRATROPIUM BROMIDE AND ALBUTEROL SULFATE 3 ML: .5; 3 SOLUTION RESPIRATORY (INHALATION) at 15:28

## 2019-07-10 RX ADMIN — ACETAMINOPHEN 650 MG: 325 TABLET ORAL at 18:09

## 2019-07-10 RX ADMIN — ACETAMINOPHEN 650 MG: 325 TABLET ORAL at 10:29

## 2019-07-10 RX ADMIN — IPRATROPIUM BROMIDE AND ALBUTEROL SULFATE 3 ML: .5; 3 SOLUTION RESPIRATORY (INHALATION) at 20:30

## 2019-07-10 RX ADMIN — HEPARIN SODIUM 5000 UNITS: 5000 INJECTION INTRAVENOUS; SUBCUTANEOUS at 18:09

## 2019-07-10 RX ADMIN — PIPERACILLIN SODIUM,TAZOBACTAM SODIUM 3.38 G: 3; .375 INJECTION, POWDER, FOR SOLUTION INTRAVENOUS at 12:13

## 2019-07-10 RX ADMIN — PIPERACILLIN SODIUM,TAZOBACTAM SODIUM 3.38 G: 3; .375 INJECTION, POWDER, FOR SOLUTION INTRAVENOUS at 18:09

## 2019-07-10 RX ADMIN — LEVOFLOXACIN 750 MG: 5 INJECTION, SOLUTION INTRAVENOUS at 10:28

## 2019-07-10 RX ADMIN — METOPROLOL TARTRATE 25 MG: 25 TABLET ORAL at 19:28

## 2019-07-10 RX ADMIN — POTASSIUM CHLORIDE 40 MEQ: 20 TABLET, EXTENDED RELEASE ORAL at 18:09

## 2019-07-10 RX ADMIN — HEPARIN SODIUM 5000 UNITS: 5000 INJECTION INTRAVENOUS; SUBCUTANEOUS at 10:29

## 2019-07-10 NOTE — PROGRESS NOTES
Problem: Dysphagia (Adult)  Goal: *Acute Goals and Plan of Care (Insert Text)  Description  Recommendations:  Diet: Soft solid/thin liquid   Meds: Per patient preference  Aspiration Precautions  Oral Care TID    Goals:  Patient will:  1. Tolerate PO trials with 0 s/s overt distress in 4/5 trials (goal met)  2. Utilize compensatory swallow strategies/maneuvers (decrease bite/sip, size/rate, alt. liq/sol) with min cues in 4/5 trials (goal met)  3. Perform oral-motor/laryngeal exercises to increase oropharyngeal swallow function with min cues (goal N/A)  4. Complete an objective swallow study (i.e., MBSS) to assess swallow integrity, r/o aspiration, and determine of safest LRD, min A (goal N/A)    Outcome: Progressing Towards Goal    SPEECH LANGUAGE PATHOLOGY DYSPHAGIA TREATMENT    Patient: Pete Cho (87 y.o. male)  Date: 7/10/2019  Diagnosis: Pneumonia [J18.9]  COPD with acute exacerbation ([address] [J44.1]  Pneumonia [J18.9]  COPD with acute exacerbation ([address] [J44.1] Pneumonia       Precautions: aspiration    PLOF:as per H&P     ASSESSMENT:  Pt seen b/s for dysphagia tx. Pt awake, A&O x4, hoarse vocal quality with intermittent spasmodic dysphonia qualities, suspect related to level anxiety and pulmonary status. Pt denied odynophagia, globus sensation, or overt s/sx aspiration. Pt accepted soft solids and straw sips thin with mildly prolonged mastication (due to state of natural dentition), slow A-P transit, good oral clearing, timely swallow response with appropriate laryngeal elevation. Pt reported fear of choking while eating, reviewed basic compensatory swallow strategies: small bites/ sips, alternate solids/ liquids, decreased rate of intake, upright for all po feeds. Pt independently utilizing small sips and alternating solids/ liquids. Pt stated he is usually \"up like this\" (HOB @ 60*) when he eats. Pt without overt s/sx aspiration following swallow.   Pt with increased audible exhalation following completion of session, ?related to level anxiety and pulmonary status vs swallow function? Pt would benefit from MBS to r/o aspiration and determine swallow function. Orders written for study to be completed tomorrow. Rec: continue soft solids with thin liquids, aspiration precautions, MBS tomorrow. SLP will f/u with diet tolerance and follow through with compensatory swallow strategies. Progression toward goals:  ?         Improving appropriately and progressing toward goals  ? Improving slowly and progressing toward goals  ? Not making progress toward goals and plan of care will be adjusted     PLAN:  Recommendations and Planned Interventions:  continue soft solids with thin liquids, aspiration precautions, MBS tomorrow. Patient continues to benefit from skilled intervention to address the above impairments. Continue treatment per established plan of care. Discharge Recommendations: To Be Determined     SUBJECTIVE:   Patient stated ? I'm afraid of choking. ?    OBJECTIVE:   Cognitive and Communication Status:  Neurologic State: Alert, Appropriate for age  Orientation Level: Oriented X4  Cognition: Appropriate safety awareness, Appropriate for age attention/concentration, Follows commands  Perception: Appears intact  Perseveration: No perseveration noted  Safety/Judgement: Fall prevention, Awareness of environment  Dysphagia Treatment:  Oral Assessment:  Oral Assessment  Labial: No impairment  Dentition: Natural, Limited  Oral Hygiene: fair  Lingual: No impairment  Velum: No impairment  Mandible: No impairment  P.O. Trials:   Patient Position: HOB 60*   Vocal quality prior to P.O.: Spasmodic dysphonia(intermittently, suspect from respiratory status)   Consistency Presented:  Thin liquid, Mechanical soft   How Presented: Self-fed/presented, Straw       Bolus Acceptance: No impairment   Bolus Formation/Control: Impaired   Type of Impairment: Mastication(due to state of natural dentition)   Propulsion: Delayed (# of seconds)   Oral Residue: None   Initiation of Swallow: No impairment   Laryngeal Elevation: Functional   Aspiration Signs/Symptoms: None   Pharyngeal Phase Characteristics: No impairment, issues, or problems    Effective Modifications: Alternate liquids/solids, Small sips and bites   Cues for Modifications: Minimal         Oral Phase Severity: Mild   Pharyngeal Phase Severity : Mild        PAIN:  Pain level pre-treatment: 0/10   Pain level post-treatment: 0/10       After treatment:   ?              Patient left in no apparent distress sitting up in chair  ? Patient left in no apparent distress in bed  ? Call bell left within reach  ? Nursing notified  ? Family present  ? Caregiver present  ? Bed alarm activated      COMMUNICATION/EDUCATION:   ? Aspiration precautions; swallow safety; compensatory techniques  ? Patient unable to participate in education; education ongoing with staff  ? Posted safety precautions in patient's room.   ? Oral-motor/laryngeal strengthening exercises      Leila Gomez MS, CCC/SLP  Time Calculation: 24 mins

## 2019-07-10 NOTE — PROGRESS NOTES
Shift summary: Pt is A/O, denied pain, up with assist and PT. Using urinal to void. On remote cardiac monitoring with continuous pulse Ox. Had an episode when O2 sats was 88% per monitor tech - encouraged pt to use IS and O2 sats was up to 96%, on RA at this time. Assisted with set up for breakfast. Passed on in report that pt requested bigger pills crushed and mixed with applesauce.  Resting quietly at this time

## 2019-07-10 NOTE — PROGRESS NOTES
Bedside and Verbal shift change report given to VINCE Arreguin (oncoming nurse) by Constance (offgoing nurse). Report included the following information SBAR, Kardex, Intake/Output, MAR, Recent Results and Cardiac Rhythm SA/ PVC's/ non-sustain VTach.

## 2019-07-10 NOTE — PROGRESS NOTES
Problem: Self Care Deficits Care Plan (Adult)  Goal: *Acute Goals and Plan of Care (Insert Text)  Description  Initial Occupational Therapy Goals (7/10/2019) Within 7 day(s):    1. Patient will perform grooming standing at sink with Supervision x 2-3 minutes for increased independence with ADLs. 2. Patient will perform UB dressing with setup for increased independence with ADLs. 3. Patient will perform LB dressing with setup & A/E PRN for increased independence with ADLs. 4. Patient will perform all aspects of toileting with Supervision for increased independence in ADLs  5. Patient will independently apply energy conservation techniques with 1 verbal cue(s) for increased independence with ADLs. 6. Patient will utilize good body mechanics during ADLs with 1 verbal cue(s). Outcome: Progressing Towards Goal     OCCUPATIONAL THERAPY EVALUATION    Patient: Juan Diego Page (72 y.o. male)  Date: 7/10/2019  Primary Diagnosis: Pneumonia [J18.9]  COPD with acute exacerbation ([address] [J44.1]  Pneumonia [J18.9]  COPD with acute exacerbation ([address] [J44.1]        Precautions:  Fall(SPO2)  PLOF: Patient reports grossly mod I; recently quit smoking    ASSESSMENT :  Based on the objective data described below, the patient presents with significant decline in ADLs d/t respiratory status. Pt pleasant and cooperative. Pt had grand-daughter at bedside. Pt utilizing forward flexion technique for sock donning w/ compromising respiratory status during technique. Pt w/ decreased R hip ROM for compensatory strategy, but LLE w/ adequate ROM for side sitting technique. Patient able to stand for 1.5 minutes, but requiring Catina d/t pushing chair out from underneath him.     Education: Patient instructed on home safety, body mechanics for optimal respiratory effort, Energy Conservation/Work Simplification Techniques, adaptive strategies and adaptive dressing techniques including clothing modifications with patient verbalizing understanding at this time. Patient will benefit from skilled intervention to address the above impairments. Patient's rehabilitation potential is considered to be Good  Factors which may influence rehabilitation potential include:   ? None noted  ? Mental ability/status  ? Medical condition  ? Home/family situation and support systems  ? Safety awareness  ? Pain tolerance/management  ? Other:      PLAN :  Recommendations and Planned Interventions:   ?               Self Care Training                  ? Therapeutic Activities  ? Functional Mobility Training   ? Cognitive Retraining  ? Therapeutic Exercises           ? Endurance Activities  ? Balance Training                    ? Neuromuscular Re-Education  ? Visual/Perceptual Training     ? Home Safety Training  ? Patient Education                   ? Family Training/Education  ? Other (comment):    Frequency/Duration: Patient will be followed by occupational therapy 1-2 times per day/1-3 days per week to address goals. Discharge Recommendations: Rehab  Would benefit from [facility] out[facility]  Further Equipment Recommendations for Discharge: To Be Determined (TBD) at next level of care       SUBJECTIVE:   Patient stated ? I feel like my legs have gotten weak. ?    OBJECTIVE DATA SUMMARY:     Past Medical History:   Diagnosis Date    Cancer ([address]     prostate    COPD (chronic obstructive pulmonary disease) ([address]     Hypertension     Pneumonia     Radiation effect      Past Surgical History:   Procedure Laterality Date    HX HERNIA REPAIR       Barriers to Learning/Limitations: yes;  sensory deficits-vision/hearing/speech and physical  Compensate with: visual, verbal, tactile, kinesthetic cues/model    Home Situation: (reports spouse and grand-daughter still smoke)  Home Situation  Home Environment: Trailer/mobile home  # Steps to Enter: 6  One/Two Story Residence: One story  Living Alone: No  Support Systems: Spouse/Significant Other/Partner  Patient Expects to be Discharged to[de-identified] Trailer/mobile home  Current DME Used/Available at Home: Cane, straight, Nebulizer  ? Right hand dominant   ? Left hand dominant    Cognitive/Behavioral Status:  Neurologic State: Alert; Appropriate for age  Orientation Level: Appropriate for age;Oriented X4  Cognition: Appropriate decision making; Appropriate for age attention/concentration; Appropriate safety awareness; Follows commands  Safety/Judgement: Awareness of environment; Fall prevention; Insight into deficits    Skin: fragile, but grossly intact  Edema: No significant edema noted     Vision/Perceptual:      Appears WFL     Coordination: BUE  Coordination: Within functional limits  Fine Motor Skills-Upper: Left Intact; Right Intact    Gross Motor Skills-Upper: Left Intact; Right Intact    Balance:  Sitting: Intact  Standing: Intact; With support    Strength: BUE  Strength: Generally decreased, functional    Tone & Sensation: BUE  Tone: Normal  Sensation: Intact    Range of Motion: BUE  AROM: Generally decreased, functional  PROM: Generally decreased, functional    Functional Mobility and Transfers for ADLs:  Transfers:  Sit to Stand: Contact guard assistance; Adaptive equipment; Additional time  Bed to Chair: Contact guard assistance; Adaptive equipment   Toilet Transfer : Contact guard assistance; Adaptive equipment   Bathroom Mobility: Contact guard assistance    ADL Assessment:   Feeding: Setup    Oral Facial Hygiene/Grooming: Setup(seated)    Bathing: Minimum assistance; Additional time    Upper Body Dressing: Setup    Lower Body Dressing: Minimum assistance; Moderate assistance; Additional time    Toileting: Minimum assistance    ADL Intervention:  Feeding  Drink to Mouth: Set-up    Lower Body Dressing Assistance  Socks: Minimum assistance; Moderate assistance; Compensatory technique training(bending forward)  Position Performed: Seated in chair    Cognitive Retraining  Problem Solving: Inductive reason; Identifying the task; Identifying the problem;General alternative solution;Deductive reason; Awareness of environment  Executive Functions: Executing cognitive plans  Organizing/Sequencing: Breaking task down;Prioritizing  Safety/Judgement: Awareness of environment; Fall prevention; Insight into deficits    Pain:  Pain level pre-treatment: 0/10   Pain level post-treatment: 0/10   Pain Intervention(s): Medication provided by Nursing (see MAR); Rest, Repositioning   Response to intervention: Nurse notified, See doc flow sheet    Activity Tolerance:   Fair-. Patient able to stand 1.5 minute(s). Patient able to complete ADLs with frequent rest breaks. Patient limited by respiratory status, strength, functional endurance, balance. Patient unsteady. Please refer to the flowsheet for vital signs taken during this treatment. After treatment:   ? Patient left in no apparent distress sitting up in chair  ? Patient left in no apparent distress in bed  ? Call bell left within reach  ? Nursing notified  ? Caregiver present/grand-daughter  ? Bed alarm activated    COMMUNICATION/EDUCATION:   ? Role of Occupational Therapy in the acute care setting  ? Home safety education was provided and the patient/caregiver indicated understanding. ? Patient/family have participated as able in goal setting and plan of care. ? Patient/family agree to work toward stated goals and plan of care. ? Patient understands intent and goals of therapy, but is neutral about his/her participation. ? Patient is unable to participate in goal setting and plan of care. Thank you for this referral.  [place]  Time Calculation: 19 mins    Eval Complexity: History: HIGH Complexity : Extensive review of history including physical, cognitive and psychosocial history ;    Examination: HIGH Complexity : 5 or more performance deficits relating to physical, cognitive , or psychosocial skils that result in activity limitations and / or participation restrictions; Decision Making:HIGH Complexity : Patient presents with comorbidities that affect occupational performance.  Signifigant modification of tasks or assistance (eg, physical or verbal) with assessment (s) is necessary to enable patient to complete evaluation

## 2019-07-10 NOTE — ROUTINE PROCESS
TRANSFER - OUT REPORT:    Verbal report given to [facility], RN (name) on Phi Powell  being transferred to Telemetry (unit) for change in patient condition(chest pain)       Report consisted of patients Situation, Background, Assessment and Recommendations(SBAR). Information from the following report(s) SBAR, Kardex, [facility] and Recent Results was reviewed with the receiving nurse. Lines:   Peripheral IV 07/07/19 Right Hand (Active)   Site Assessment Clean, dry, & intact 7/10/2019  8:15 AM   Phlebitis Assessment 0 7/10/2019  8:15 AM   Infiltration Assessment 0 7/10/2019  8:15 AM   Dressing Status Clean, dry, & intact 7/10/2019  8:15 AM   Dressing Type Transparent 7/10/2019  8:15 AM   Hub Color/Line Status Patent; Flushed 7/10/2019  8:15 AM   Action Taken Open ports on tubing capped 7/10/2019 12:50 AM   Alcohol Cap Used Yes 7/10/2019 12:50 AM       Peripheral IV 07/07/19 Right Antecubital (Active)   Site Assessment Clean, dry, & intact 7/10/2019  8:15 AM   Phlebitis Assessment 0 7/10/2019  8:15 AM   Infiltration Assessment 0 7/10/2019  8:15 AM   Dressing Status Clean, dry, & intact 7/10/2019  8:15 AM   Dressing Type Transparent 7/10/2019  8:15 AM   Hub Color/Line Status Patent; Flushed 7/10/2019  8:15 AM   Action Taken Open ports on tubing capped 7/10/2019 12:50 AM   Alcohol Cap Used Yes 7/10/2019 12:50 AM       Peripheral IV 07/07/19 Left Antecubital (Active)   Site Assessment Clean, dry, & intact 7/10/2019  8:15 AM   Phlebitis Assessment 0 7/10/2019  8:15 AM   Infiltration Assessment 0 7/10/2019  8:15 AM   Dressing Status Clean, dry, & intact 7/10/2019  8:15 AM   Dressing Type Transparent 7/10/2019  8:15 AM   Hub Color/Line Status Patent; Flushed 7/10/2019  8:15 AM   Action Taken Open ports on tubing capped 7/10/2019 12:50 AM   Alcohol Cap Used Yes 7/10/2019 12:50 AM      Opportunity for questions and clarification was provided.       Patient transported with:   Patient-specific medications from Pharmacy  Registered Nurse

## 2019-07-10 NOTE — ROUTINE PROCESS
Bedside and Verbal shift change report given to VINCE Martinez (oncoming nurse) by Mel Middleton RN   (offgoing nurse). Report included the following information SBAR, Kardex, Intake/Output, MAR and Recent Results.

## 2019-07-10 NOTE — ROUTINE PROCESS
Bedside and Verbal shift change report given to HILDA Timmons RN (oncoming nurse) by Shemar Carr (offgoing nurse). Report included the following information SBAR, Kardex, Intake/Output and MAR.

## 2019-07-10 NOTE — PROGRESS NOTES
Palliative Medicine  [place]: [phone]  [facility]: [phone])      Palliative team, NP Savanah Mcmahon RN Stephany Green and this MSW, followed up with patient at bedside. He was awake an oriented x4 sitting up in bed. He appeared tried and shared he felt tired \"I'm cat napping off and on\". He was hard of hearing at times even with hearing aid in. Patient questioned if he could apply for Medicaid. He stated he at first told CM he wasn't interested, but now feels it would be a good idea. Mr. Tangela Godfrey became tearful and started crying after question regards Medicaid UAI. He wasn't able to identify what triggered the tears. Sensed the whole hospitalization stay combined with financial stress has become overwhelming. Patient is easily triggered to become tearful. Patient seems to be struggling with some depression/anxiety. Provided Mr. Tangela Godfrey support through a secure and empathy environment. NP did discuss the benefit of adding an antidepressant. Encouraged him to discuss antidepressant medication with his PCP after d/c. We left patient with speech therapy to assess his swallowing. He verbalized gratitude for our support. Mr. Tangela Godfrey is expecting his wife to visit around lunch time. Spoke with CM regarding patient's request for a UAI to assess for Medicaid qualifications. PM team will continue to follow along.      Time in: 214  Time out: [id number] [facility]  Palliative

## 2019-07-10 NOTE — PROGRESS NOTES
Received bedside report from night shift RN. Patient resting quietly in bed. Alert and oriented x 4. Expiratory grunting at times. Patient has dyspnea with exertion when ambulating from bed to chair. Patient eating all of meals without difficulty. 1640 telemetry monitor technician called nurses station, patient's HR sustained in 130's. Upon checking on patient, he was laying in bed watching tv. Patient stated 5/10 pain in chest, \"feels like pressure\". MEWS 4, vital signs were as follows:   Visit Vitals  BP (!) 167/94   Pulse 84   Temp 98.3 °F (36.8 °C)   Resp 20   Ht 5' 8\" (1.727 m)   Wt 89.1 kg (196 lb 6.4 oz)   SpO2 97%   BMI 29.86 kg/m²     Dr. Leonardo Sotomayor was paged, stated to put in EKG and cardiac enzymes. EKG and phlebotomy notified STAT orders were put in. EKG in room. Phlebotomy in room. Nursing supervisor came, gave verbal orders for magnesium to be drawn also. [phone] patient states no longer having chest pain. MEWS 1. Patient Vitals for the past 12 hrs:   Temp Pulse Resp BP SpO2   07/10/19 1720 98.8 °F (37.1 °C) 93 19 146/63 96 %   07/10/19 1649 98.3 °F (36.8 °C) (!) 135 20 (!) 167/94 97 %   07/10/19 1546 98.6 °F (37 °C) 84 18 166/72 96 %   07/10/19 1530 -- -- -- -- 97 %   07/10/19 1201 98 °F (36.7 °C) 88 16 160/88 94 %   07/10/19 0715 97.9 °F (36.6 °C) 80 16 162/76 95 %     1743 patient being transferred to room 332 on telemetry.  SBAR given to Mayda Echols, [facility]

## 2019-07-10 NOTE — PROGRESS NOTES
D/C Plan: SNF 7/11/19    Noted pt transfer to [facility]. CM and provider met with pt at bedside to discuss transition of care. Pt has indicated he would like to be screened for Medicaid and have a LTSS/UAI completed. Pt is now agreeable to SNF placement. CM provided a list of area facilities to refer to and offered [address]. Pt indicated he lives in [place] and would like a facility in this area. Pt would like to have clinical information sent to [facility] and [facility]. CMS has been notified to assist.  CM to await an accepting facility. CM continuing to follow. 1500:   CM met with pt, his wife and his grand daughter at bedside. Pt's family has reviewed the SNF list provided and they are in agreement with pt's choices. CM discussed transport to rehab. Pt's family would like medical transport arranged. CM notified pt/family that insurance coverage for transport can not be guaranteed and pt may receive a bill. Pt/family indicated an understanding of this and would like to move forward with medical transport. Pt's family member would like to ride with pt to the facility. Pt's family has toured [facility] and this facility is the family/pt first choice. CM to continue to follow and assist. Anticipate pt will transfer with in the next 24 hours to SNF pending acceptance. CM continuing to follow and assist with SNF placement. Care Management Interventions  PCP Verified by CM:  Yes  Palliative Care Criteria Met (RRAT>21 & CHF Dx)?: Yes  Mode of Transport at Discharge: BLS  Transition of Care Consult (CM Consult): SNF  Partner SNF: Yes  Health Maintenance Reviewed: Yes  Physical Therapy Consult: Yes  Occupational Therapy Consult: Yes  Speech Therapy Consult: Yes  Current Support Network: Lives with Spouse, Family Lives Nearby  Confirm Follow Up Transport: Family  Plan discussed with Pt/Family/Caregiver: Yes  Freedom of Choice Offered: Yes  Discharge Location  Discharge Placement: Skilled nursing facility

## 2019-07-10 NOTE — PROGRESS NOTES
TRANSFER - IN REPORT:    Verbal report received from VINCE ariza (name) on Galina Mccann  being received from [address] [place] [address] (unit) for urgent transfer for Af[facility] RVR     Report consisted of patients Situation, Background, Assessment and   Recommendations(SBAR). Information from the following report(s) SBAR was reviewed with the receiving nurse. Opportunity for questions and clarification was provided. Assessment completed upon patients arrival to unit and care assumed.

## 2019-07-10 NOTE — PROGRESS NOTES
Problem: Mobility Impaired (Adult and Pediatric)  Goal: *Acute Goals and Plan of Care (Insert Text)  Description  Physical Therapy Goals   Initiated 7/10/2019 and to be accomplished within 3-5 day(s)  1. Patient will move from supine <> sit with S in prep for out of bed activity and change of position. 2.  Patient will perform sit<> stand with S/LRAD in prep for transfers/ambulation. 3.  Patient will transfer from bed <> chair with S/LRAD for time up in chair for completion of ADL activity. 4.  Patient will ambulate 100 feet with S/LRAD for improved functional mobility/safe discharge. 5.  Patient will ascend/descend 6 stairs with handrail(s) with CGA for home re-entry as needed. Outcome: Progressing Towards Goal    PHYSICAL THERAPY EVALUATION    Patient: Angel Rajan (1 y.o. male)  Date: 7/10/2019  Primary Diagnosis: Pneumonia [J18.9]  COPD with acute exacerbation ([address] [J44.1]  Pneumonia [J18.9]  COPD with acute exacerbation ([address] [J44.1]  Precautions:Fall(SPO2)    ASSESSMENT :  Based on the objective data described below, the patient seen on medical unit and presents with decreased UE/LE motor performance, limitations in functional mobility with regard to bed mobility/transfers, decreased gait quality and decreased activity tolerance. Pt reports amb with rollator  and lives spouse in single story home PTA. Reports no pain at this time. Pt demonstrates transition to sit EOB with min assist, transfers to stand with min/CGA and able to complete GT/RW/min/CGA 40ft. Lisbet slow with narrow base of support and decreased foot clearance. Pt with intermittent coughing, more after session completed. Mild KUMAR noted. Pt left up in chair with all needs in reach, in NAD and nurse notified. Recommend SNF for follow up physical therapy upon discharge to reach maximal level of independence/safety with functional mobility.      Pt Education: Role of physical therapy in acute care setting, fall prevention and safety/technique during functional mobility tasks, activity pacing      Patient will benefit from skilled intervention to address the above impairments. Patients rehabilitation potential is considered to be Fair  Factors which may influence rehabilitation potential include:   ? None noted  ? Mental ability/status  ? Medical condition  ? Home/family situation and support systems  ? Safety awareness  ? Pain tolerance/management  ? Other:      PLAN :  Recommendations and Planned Interventions:  ?           Bed Mobility Training             ? Neuromuscular Re-Education  ? Transfer Training                   ? Orthotic/Prosthetic Training  ? Gait Training                          ? Modalities  ? Therapeutic Exercises          ? Edema Management/Control  ? Therapeutic Activities            ? Patient and Family Training/Education  ? Other (comment):    Frequency/Duration: Patient will be followed by physical therapy 1-2 times per day to address goals. Discharge Recommendations:  Shree Wiley  Further Equipment Recommendations for Discharge: N/A     SUBJECTIVE:   Patient stated I'm here.     OBJECTIVE DATA SUMMARY:     Past Medical History:   Diagnosis Date    Cancer ([address]     prostate    COPD (chronic obstructive pulmonary disease) ([address]     Hypertension     Pneumonia     Radiation effect      Past Surgical History:   Procedure Laterality Date    HX HERNIA REPAIR       Barriers to Learning/Limitations: yes;  physical  Compensate with: Visual Cues, Verbal Cues and Tactile Cues  Prior Level of Function/Home Situation: as noted above  Home Situation  Home Environment: Private residence  # Steps to Enter: 6  Rails to Enter: Yes  Hand Rails : Bilateral  One/Two Story Residence: One story  Living Alone: No  Support Systems: Spouse/Significant Other/Partner  Patient Expects to be Discharged to[de-identified] Unknown  Current DME Used/Available at Home: Aracelis Corea, rollator, Nata Alex, straight, Nebulizer  Tub or Shower Type: Tub/Shower combination  Critical Behavior:  Neurologic State: Alert; Appropriate for age  Orientation Level: Appropriate for age;Oriented X4  Cognition: Appropriate decision making; Appropriate for age attention/concentration; Appropriate safety awareness; Follows commands  Safety/Judgement: Awareness of environment; Fall prevention; Insight into deficits  Psychosocial  Patient Behaviors: Calm; Cooperative  Family  Behaviors: Calm; Cooperative;Supportive; Appropriate for situation(grand-daughter)  Needs Expressed: Educational;Emotional  Purposeful Interaction: Yes  Pt Identified Daily Priority: Clinical issues (comment)  Caritas Process: Nurture loving kindness;Enable irais/hope;Establish trust;Nurture spiritual self;Teaching/learning; Attend basic human needs;Create healing environment  Caring Interventions: Reassure  Reassure: Therapeutic listening; Informing; Acceptance; Instilling irais and hope;Support family  Therapeutic Modalities: Deep breathing;Humor; Intentional therapeutic touch  Skin Condition/Temp: Warm;Dry;Fragile  Family  Behaviors: Calm; Cooperative;Supportive; Appropriate for situation(grand-daughter)  Skin Integrity: Intact(scab to abdomen)  Skin Integumentary  Skin Color: Appropriate for ethnicity(some bruising to abdomen)  Skin Condition/Temp: Warm;Dry;Fragile  Skin Integrity: Intact(scab to abdomen)  Turgor: Epidermis thin w/ loss of subcut tissue  Hair Growth: Present  Varicosities: Absent  Strength:    Strength: Generally decreased, functional  Tone & Sensation:   Tone: Normal  Sensation: Intact  Range Of Motion:  AROM: Generally decreased, functional  PROM: Generally decreased, functional  Functional Mobility:  Bed Mobility:  Supine to Sit: Minimum assistance  Scooting: Minimum assistance  Transfers:  Sit to Stand: Minimum assistance, Contact guard assistance;  Additional time  Stand to Sit: Minimum assistance, Contact guard assistance; Additional time  Bed to Chair: Minimum assistance, Contact guard assistance; Additional time  Balance:   Sitting: Intact  Standing: Intact; With support  Standing - Static: Fair  Standing - Dynamic : Fair  Ambulation/Gait Training:  Distance (ft): 40 Feet (ft)  Assistive Device: Gait belt;Walker, rolling  Ambulation - Level of Assistance: Minimal assistance; Additional time  Gait Description (WDL): Exceptions to WDL  Gait Abnormalities: Decreased step clearance  Base of Support: Narrowed  Speed/Lisbet: Slow  Step Length: Right shortened;Left shortened  Interventions: Safety awareness training;Verbal cues  Pain:  Pain Scale 1: Numeric (0 - 10)  Pain Intensity 1: 0  Pain Location 1: Chest  Pain Orientation 1: Upper  Pain Description 1: Pressure  Pain Intervention(s) 1: MD notified (comment)  Activity Tolerance:   Fair   Please refer to the flowsheet for vital signs taken during this treatment. After treatment:   ?         Patient left in no apparent distress sitting up in chair  ? Patient left in no apparent distress in bed  ? Call bell left within reach  ? Nursing notified  ? Caregiver present  ? Bed alarm activated    COMMUNICATION/EDUCATION:   ?         Fall prevention education was provided and the patient/caregiver indicated understanding. ? Patient/family have participated as able in goal setting and plan of care. ?         Patient/family agree to work toward stated goals and plan of care. ?         Patient understands intent and goals of therapy, but is neutral about his/her participation. ? Patient is unable to participate in goal setting and plan of care.     Eval Complexity: History: HIGH Complexity :3+ comorbidities / personal factors will impact the outcome/ POC Exam:MEDIUM Complexity : 3 Standardized tests and measures addressing body structure, function, activity limitation and / or participation in recreation  Presentation: MEDIUM Complexity : Evolving with changing characteristics  Clinical Decision Making:Medium Complexity    Overall Complexity:MEDIUM    Thank you for this referral.  Arelis Rangel, PT   Time Calculation: 22 mins

## 2019-07-10 NOTE — PROGRESS NOTES
[facility]  Pulmonary, Critical Care, and Sleep Medicine    Pulmonary Note    Name: Christal Morillo MRN: [id number]   : 1945 Hospital: [facility] [place]   Date: 7/10/2019  Room: [facility]     Subjective:   Patient is a 74 y.o. 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 Patient was admitted yesterday with COPD exacerbation to tele floor. He seemed to have worsened overnight, failed BIPAP and was subsequently intubated. 07/10/19   Patient on room air, sitting in chair at bedside  Improving cough and SOB  Denies chest pain, wheezing or hemoptysis. Afebrile. Tolerating PO diet  Came out of ICU yesterday and no overnight events    Followed with Dr. Yani Johnson in our office before  Reports quit smoking few months ago      Review of Systems   General ROS: negative for  - fever, chills, weight loss, fatigue and malaise  Cardiovascular ROS: negative for - chest pain, edema, murmur, orthopnea, palpitations or pnd  Gastrointestinal ROS: no nausea, vomiting, abdominal pain, change in bowel habits, or black or bloody stools  Dermatological ROS: negative for - pruritus, rash or skin lesion changes       Past Medical History:   Diagnosis Date    Cancer ([address]     prostate    COPD (chronic obstructive pulmonary disease) ([address]     Hypertension     Pneumonia     Radiation effect       Past Surgical History:   Procedure Laterality Date    HX HERNIA REPAIR        Prior to Admission medications    Medication Sig Start Date End Date Taking? Authorizing Provider   albuterol (PROVENTIL VENTOLIN) 2.5 mg /3 mL (0.083 %) nebulizer solution 3 mL by Nebulization route every two (2) hours as needed for Wheezing. 19   Layla Bell DO   albuterol-ipratropium (DUO-NEB) 2.5 mg-0.5 mg/3 ml nebu 3 mL by Nebulization route every four (4) hours as needed.  19   Layla Bell, DO chlorpheniramine-HYDROcodone (TUSSIONEX) 10-8 mg/5 mL suspension Take 5 mL by mouth every twelve (12) hours as needed for Cough. Max Daily Amount: 10 mL. 2/9/19   Nancy Kothari DO   fluticasone furoate (ARNUITY ELLIPTA) 100 mcg/actuation dsdv inhaler Take 1 Puff by inhalation daily. 10/24/18   Syd Hernandez PA-C   acetaminophen (TYLENOL ARTHRITIS PAIN) 650 mg TbER Take 1 Tab by mouth every eight (8) hours. 9/8/18   Violet Corona PA-C   albuterol (PROVENTIL HFA, VENTOLIN HFA, PROAIR HFA) 90 mcg/actuation inhaler Take 2 Puffs by inhalation every four (4) hours as needed for Wheezing or Shortness of Breath. 4/23/18   Syd Hernandez PA-C   ipratropium (ATROVENT) 0.03 % nasal spray 2 Sprays every twelve (12) hours. Blayne Bran MD   tamsulosin (FLOMAX) 0.4 mg capsule Take 0.4 mg by mouth daily. Blayne Bran MD   albuterol (PROVENTIL VENTOLIN) 2.5 mg /3 mL (0.083 %) nebulizer solution 3 mL by Nebulization route every four (4) hours as needed for Wheezing. 12/23/15   TRINA Myers   chlorthalidone (HYGROTEN) 25 mg tablet Take  by mouth daily.     Blayne Bran MD   Nebulizer & Compressor machine Dispense: 1 nebulizer machine w all tubing necessary 10/6/14   Nancy Kothari DO     Allergies   Allergen Reactions    Aspirin Other (comments)     \"messes my stomach up\"      Social History     Tobacco Use    Smoking status: Former Smoker     Types: Cigarettes    Smokeless tobacco: Never Used   Substance Use Topics    Alcohol use: No      Family History   Problem Relation Age of Onset    Heart Disease Mother         Current Facility-Administered Medications   Medication Dose Route Frequency    methylPREDNISolone (PF) (Solu-MEDROL) injection 40 mg  40 mg IntraVENous Q6H    piperacillin-tazobactam (ZOSYN) 3.375 g in 0.9% sodium chloride (MBP/ADV) 100 mL MBP  3.375 g IntraVENous Q6H    Vancomycin- Pharmacy to dose  1 Each Other Rx Dosing/Monitoring    vancomycin (VANCOCIN) 1250 mg in  ml infusion  1,250 mg IntraVENous Q18H    budesonide (PULMICORT) 500 mcg/2 ml nebulizer suspension  500 mcg Nebulization BID RT    albuterol-ipratropium (DUO-NEB) 2.5 MG-0.5 MG/3 ML  3 mL Nebulization Q4H RT    levoFLOXacin (LEVAQUIN) 750 mg in D5W IVPB  750 mg IntraVENous Q24H    heparin (porcine) injection 5,000 Units  5,000 Units SubCUTAneous Q8H    acetaminophen (TYLENOL) tablet 650 mg  650 mg Oral Q8H    doxazosin (CARDURA) tablet 1 mg  1 mg Oral DAILY    pantoprazole (PROTONIX) 40 mg in sodium chloride 0.9% 10 mL injection  40 mg IntraVENous DAILY       Latest lactic acid:   Lactic acid   Date Value Ref Range Status   2019 1.4 0.4 - 2.0 MMOL/L Final   2018 0.9 0.4 - 2.0 MMOL/L Final   2018 1.6 0.4 - 2.0 MMOL/L Final       Objective:   Vital Signs:    Visit Vitals  /88   Pulse 88   Temp 98 °F (36.7 °C)   Resp 16   Ht 5' 8\" (1.727 m)   Wt 89.1 kg (196 lb 6.4 oz)   SpO2 94%   BMI 29.86 kg/m²       O2 Device: Room air   O2 Flow Rate (L/min): 0 l/min   Temp (24hrs), Av.4 °F (36.9 °C), Min:97.9 °F (36.6 °C), Max:99.1 °F (37.3 °C)       Intake/Output:   Last shift:      07/10 0701 - 07/10 1900  In: 490 [P.O.:240; I.V.:250]  Out: 650 [Urine:650]  Last 3 shifts: 1901 - 07/10 0700  In: 900 [P.O.:300;  I.V.:600]  Out: 4025 [Urine:4025]    Intake/Output Summary (Last 24 hours) at 7/10/2019 1502  Last data filed at 7/10/2019 1031  Gross per 24 hour   Intake 840 ml   Output 2725 ml   Net -1885 ml         Physical Exam:   Gene: no respiratory distress; AAOx 3, on room air  HEENT: pupils not dilated, reactive, no scleral jaundice, moist oral mucosa, no nasal drainage  Neck: No adenopathy or thyroid swelling  CVS: S1S2 no murmurs; JVD not elevated  RS: Mod to poor air entry bilaterally, symmetrical BS; no bilateral expiratory wheezes, mild bi-basal crackles  Abd: soft, non tender, no hepatosplenomegaly, no abd distension, no guarding or rigidity, bowel sounds heard  Neuro: AAO x 3, non-focal exam  Extrm: no leg edema or swelling or clubbing  Skin: no rash  Lymphatic: no cervical or supraclavicular adenopathy    Telemetry: NSR    Data review:     Recent Results (from the past 24 hour(s))   METABOLIC PANEL, BASIC    Collection Time: 07/10/19  4:30 AM   Result Value Ref Range    Sodium 137 136 - 145 mmol/L    Potassium 3.7 3.5 - 5.5 mmol/L    Chloride 103 100 - 108 mmol/L    CO2 27 21 - 32 mmol/L    Anion gap 7 3.0 - 18 mmol/L    Glucose 132 (H) 74 - 99 mg/dL    BUN 29 (H) 7.0 - 18 MG/DL    Creatinine 1.13 0.6 - 1.3 MG/DL    BUN/Creatinine ratio 26 (H) 12 - 20      GFR est AA >60 >60 ml/min/1.73m2    GFR est non-AA >60 >60 ml/min/1.73m2    Calcium 8.1 (L) 8.5 - 10.1 MG/DL   CBC WITH AUTOMATED DIFF    Collection Time: 07/10/19  4:30 AM   Result Value Ref Range    WBC 5.2 4.6 - 13.2 K/uL    RBC 3.98 (L) 4.70 - 5.50 M/uL    HGB 11.0 (L) 13.0 - 16.0 g/dL    HCT 33.5 (L) 36.0 - 48.0 %    MCV 84.2 74.0 - 97.0 FL    MCH 27.6 24.0 - 34.0 PG    MCHC 32.8 31.0 - 37.0 g/dL    RDW 14.4 11.6 - 14.5 %    PLATELET 775 416 - 741 K/uL    MPV 8.6 (L) 9.2 - 11.8 FL    NEUTROPHILS 92 (H) 40 - 73 %    LYMPHOCYTES 4 (L) 21 - 52 %    MONOCYTES 4 3 - 10 %    EOSINOPHILS 0 0 - 5 %    BASOPHILS 0 0 - 2 %    ABS. NEUTROPHILS 4.8 1.8 - 8.0 K/UL    ABS. LYMPHOCYTES 0.2 (L) 0.9 - 3.6 K/UL    ABS. MONOCYTES 0.2 0.05 - 1.2 K/UL    ABS. EOSINOPHILS 0.0 0.0 - 0.4 K/UL    ABS.  BASOPHILS 0.0 0.0 - 0.1 K/UL    PLATELET COMMENTS LARGE PLATELETS      RBC COMMENTS NORMOCYTIC, NORMOCHROMIC      DF MANUAL     VANCOMYCIN, TROUGH    Collection Time: 07/10/19  4:30 AM   Result Value Ref Range    Vancomycin,trough 15.4 10.0 - 20.0 ug/mL    Reported dose date: 20190709      Reported dose time: 1042      Reported dose: 1250 MG UNITS   GLUCOSE, POC    Collection Time: 07/10/19  7:18 AM   Result Value Ref Range    Glucose (POC) 122 (H) 70 - 110 mg/dL   GLUCOSE, POC    Collection Time: 07/10/19 12:02 PM   Result Value Ref Range    Glucose (POC) 141 (H) 70 - 110 mg/dL           Recent Labs     07/08/19  0930 07/08/19  0508   FIO2I 30 0.30   HCO3I 19.2* 20.9*   PCO2I 33.8* 36.3   PHI 7.364 7.365   PO2I 102* 85     EKG  Diagnosis   Final   Sinus tachycardia   Otherwise normal ECG   When compared with ECG of 08-FEB-2019 12:15,   aberrant conduction is no longer present   T wave inversion no longer evident in Anterior leads   Confirmed by Jorge Landon MD, -- ([id number]) on 7/6/2019 11:48:08 AM      Echo March 2017  SUMMARY:  Left ventricle: Systolic function was normal by visual assessment. Ejection fraction was estimated in the range of 55 % to 60 %. Poor resolution of endocardial border. Doppler parameters were consistent with abnormal left ventricular relaxation (grade 1 diastolic dysfunction). COMPARISONS:  Comparison was made with the previous study of 03-Oct-2014.       All Micro Results     Procedure Component Value Units Date/Time    CULTURE, BLOOD [[id number]] Collected:  07/07/19 0400    Order Status:  Completed Specimen:  Blood Updated:  07/10/19 0703     Special Requests: NO SPECIAL REQUESTS        Culture result: NO GROWTH 3 DAYS       CULTURE, BLOOD [[id number]] Collected:  07/06/19 0700    Order Status:  Completed Specimen:  Whole Blood Updated:  07/10/19 0703     Special Requests: NO SPECIAL REQUESTS        Culture result: NO GROWTH 4 DAYS       CULTURE, BLOOD [[id number]] Collected:  07/06/19 0742    Order Status:  Completed Specimen:  Whole Blood Updated:  07/10/19 0703     Special Requests: NO SPECIAL REQUESTS        Culture result: NO GROWTH 4 DAYS       CULTURE, RESPIRATORY/SPUTUM/BRONCH [place] STAIN [[id number]] Collected:  07/07/19 1050    Order Status:  Completed Specimen:  Sputum from Tracheal Aspirate Updated:  07/09/19 0852     Special Requests: NO SPECIAL REQUESTS        GRAM STAIN >25 WBC/lpf         <10 EPI/lpf         MUCUS PRESENT         NO ORGANISMS SEEN        Culture result:       RARE NORMAL RESPIRATORY LELA          CULTURE, BLOOD [[id number]] Collected:  07/07/19 0400    Order Status:  Canceled             Imaging:  [x]I have personally reviewed the patients chest radiographs images and report   CXR port 7/8/19  1. Endotracheal tube, left IJ central venous catheter, and visualized nasogastric tube as above. 2. Left retrocardiac atelectasis or airspace disease similar to preceding CT exam.  3. Unchanged right-sided pleural-based calcifications and peripherally calcified chronic right pleural effusion      Results from Hospital Encounter encounter on 07/06/19   XR ABD (KUB)    Narrative EXAM: KUB    INDICATION: OG tube placement    COMPARISON: CTA chest done earlier today    _______________    FINDINGS:    Portable supine abdominal film was performed. NG tube extends well into the  stomach. It was in the distal esophagus on CTA chest done earlier today. Nonobstructive bowel gas pattern.    _______________      Impression IMPRESSION:      1. Nasogastric tube good position. Results from Hospital Encounter encounter on 07/06/19   CTA CHEST W OR W WO CONT    Narrative EXAM: CTA chest    INDICATION: Pneumonia with COPD exacerbation. Intubated    COMPARISON: CT chest 10/20/2018. AP portable chest done earlier today. TECHNIQUE: Axial CT imaging from the thoracic inlet through the diaphragm with  intravenous contrast. Coronal and sagittal MIP reformats were generated. One or more dose reduction techniques were used on this CT: automated exposure  control, adjustment of the mAs and/or kVp according to patient size, and  iterative reconstruction techniques. The specific techniques used on this CT  exam have been documented in the patient's electronic medical record.   Digital  Imaging and Communications in Medicine (DICOM) format image data are available  to nonaffiliated external healthcare facilities or entities on a secure, media  free, reciprocally searchable basis with patient authorization for at least a  12-month period after this study. _______________    FINDINGS:    EXAM QUALITY: Overall exam quality is diagnostic. PULMONARY ARTERIES: No evidence of pulmonary embolism. MEDIASTINUM: Heart size is normal. No pericardial effusion. Aorta is normal in  caliber. NG tube is present. Tip extends down to the GE junction but not  definitely into the stomach. LUNGS: There is dense consolidation of the left lower lobe. There is a new  finding compared to prior CT and is not apparent on AP portable chest film done  earlier today. Chronic atelectasis versus scarring posterior right lower lobe is  stable as is the subpleural anterior peripherally calcified mass in the right  upper lobe. PLEURA: Chronic right pleural effusion with peripheral calcification is stable  compared to prior CT.    AIRWAY: Endotracheal tube is present in good position terminating above the  juliana. LYMPH NODES: No enlarged nodes. UPPER ABDOMEN: Partially imaged left renal cyst. Upper abdominal structures are  otherwise unremarkable. OTHER: No acute or aggressive osseous abnormalities identified. _______________      Impression IMPRESSION:    1. No evidence of pulmonary embolism. 2.  New dense consolidation left lower lobe which could represent pneumonia or  atelectasis. 3. Chronic peripherally calcified right pleural effusion with adjacent  atelectasis or scarring. 4. NG tube traverses Dobbhoff feeding tube is present with the tip extending to  the GE junction but not definitely into the stomach. It should be advanced 8 to  10 cm for better positioning. Endotracheal tube good position.            IMPRESSION:   Principal Problem:    Acute respiratory failure with hypoxia     Pneumonia     COPD with acute exacerbation J44.1  Patient Active Problem List   Diagnosis Code    COPD (chronic obstructive pulmonary disease) (HCC) J44.9    Hypertension I10    Tobacco abuse Z72.0    Chronic renal disease, stage 3, moderately decreased glomerular filtration rate between 30-59 mL/min/1.73 square meter (HCC) N18.3    COPD exacerbation (HCC) J44.1    Asbestosis ([address] J61    Pneumonia J18.9    Acute respiratory failure with hypoxia (HCC) J96.01    Advanced care planning/counseling discussion Z71.89    Debility R53.81      RECOMMENDATIONS:   · Pulm: on room air; monitor for goal SPO2 > 91%  · CTA chest negative for PE  · Budesonide neb; Duonebs; IV steroids- taper  · Aspiration prevention measures, HOB 30', pulmonary hygiene care  · Cardiac: Trops neg; hemodynamically stable  · ID: resp cxs in process; broad spectrum antibiotics - levaquin, zosyn and stop iv vanc; further narrow Ab based on cxs and clinical/radiological improvement in next 24-48 hrs  · Renal: monitor UOP and renal fn; patient being hydrated  · GI: Diet as tolerated  · Proph:  DVt and GI proph - sc heparin and PPI   · Updated patient  · Patient agrees to follow up again with DR. MC LOPEZ after DC home  Will defer respective systems problem management to primary and other consultant and follow patient with primary and other medical team  Further recommendations will be based on the patient's response to recommended treatment and results of the investigation ordered.   ADVANCE DIRECTIVE: Full Code  Discussed with RN, RT and staff     High complexity decision making was performed in this consultation and evaluation of this patient        Maeve Merrill MD

## 2019-07-10 NOTE — PROGRESS NOTES
Pharmacy Dosing Services: Vancomycin    Vancomycin Trough= 15.4 mcg/ml at 0430 7/10/19  Indication= CAP (7 days)  Goal Trough= 15-20 mcg/ml    Plan= Continue Vancomycin 1250 mg IV every 18  hours     Pharmacy to follow daily and will make changes to dose and/or frequency based on clinical status.

## 2019-07-10 NOTE — PROGRESS NOTES
1848 Rhythm changed back to Afib, will hold EKG for now. Dr Beau Elias notified. 1855 Rhythm Afib/ accelerated junctional/ SR/ frequent PVC's/ non-sustained Vtach, uncontrolled HR 's. DR Beau Elias aware, consulting Dr Olga Cruz.    1900 Dr Olga Cruz on unit, reviewed rhythm and alarms, will place new order, continue to monitor.

## 2019-07-10 NOTE — CONSULTS
[facility]: [phone] ([phone])  [facility]: [phone]   [facility]: [phone]    Patient Name: Dio Lee  YOB: 1945    Date of Initial Consult: 7/8/2019, follow up 7/9/2019, 7/10/2019   Reason for Consult: care decisions   Requesting Provider: Dr Wilfrid Baeza  Primary Care Physician: Emelia Lim MD      SUMMARY:   Dio Lee is a 74y.o. year old with a past history of prostate cancer, COPD, Asbestosis, hypertension, pneumonia  who was admitted on 7/6/2019 from home with a diagnosis of shortness of breath. Found to have RLL pneumonia and COPD acute exacerbation. He worsened over night, placed on BIPAP but failed and ultimately required oral intubation. He was able to be successfully extubated earlier today. Current medical issues leading to Palliative Medicine involvement include: 74year old male with COPD, asbestos, pleural disease who required intubation for respiratory support. Palliative medicine is consulted for support and goals of care discussion. 7/9/2019 transferred out of ICU, off oxygen, alert reports he is feeling much better. Calm today. 7/10/2019 Mr Hillary Rubalcava is alert comfortable appearing, reports feeling better. Emotional today with periods of crying. Tells us he has anxiety and this event was  particularly \" scary\" . Currently maintaining off nasal cannula. PALLIATIVE DIAGNOSES:   1. Advanced care plan discussion   2. COPD   3. Shortness of breath / acute respiratory failure   4. Debility        PLAN:   1. 7/10/2019 follow up along with Ms Tamara RN and Ms Marti Valerio MSW. Patient is alert oriented x 3, verbal. Reports feeling better. Currently, breathing comfortably off nasal cannula. He is a bit more anxious this am. We spent time with listening support. Per patient he has high anxiety at home. Tearful at times. Speech therapy at bedside and appreciate assistance. Plan for MBS tomorrow. Bedside swallow by ST safe for thins, solids. Encouraged follow up with his PCP and pulmonology as o/p. He will discuss with PCP potential for antidepressant to with management of anxiety. Goals of care in depth conversation currently difficult due to anxiety and high emotions. We continue to offer support to patient. Family not currently at bedside. Goals of care full code with full interventions    ( please see below for previous conversations)     2. 7/9/2019 follow up along with TAMI Chawla. Mr Nelida Castro has been transferred out of ICU to [facility]. He reports doing much better. Maintaining oxygen saturations on room air. He feels less anxious today, but still tearful at times when  his family leaves. Spoke with his wife and granddaughter. Wife shares he has completed a \" document\" which denotes his medical wishes, she reports  it directs for no long term ventilator support. This is his first intubation of which he has little memory. Family aware his lungs are not healthy there may come a time in which if he is intubated weaning from the vent maybe difficult, . Offered support to patient and family today. Goals of care not currently changed full code with full interventions. Discussed with wife and granddaughter encouraged  follow up with his PCP to  discuss options of anti depressant with anxiolytic properties if PCP, patient and family think appropriate and beneficial. Wife shares he seems to do well a short time after hospitalization, ER visits or PCP visits, then becomes highly anxious, short of breath and wants to return to ER. Difficult to tease out if at times shortness of breath is prompting anxiety or if anxiety prompting shortness of breath. We also highly encouraged o/p pulmonology follow up   7/8/2019   Advanced care plan discussion patient has reportedly competed an AMD but not in our system. Seen today along with TAMI Chawla and Ms Master's RN. We saw post extubation.  He is alert and oriented, anxious but very pleasant gentleman. His wife and 2 grand children at bedside. . Patient is tearful saying he is worse this time \" I am afraid I will not make it\" Offered support to patient and wife who was also tearful. We did not discuss goals of care with Mr Aditya Fontenot today as he is very anxious at time of our visit. We did speak with his wife and family who agreed with waiting until tomorrow and patient a bit more relaxed and less tearful. Current goals of care full code with full interventions. 3. COPD former tobacco user, not on chronic O2 at home. Patient reports he has been getting more short breath at home and it is very distressing to him. He feels his disease is progressing. 4. Shortness of breath maintaining good O2 sats on 2 liters post extubation. He tells us he currently does not feel short of breath. Prior to this event he has never been intubated   5. Debility enjoys foot ball. Tells us he has frequent hospitalizations but did not see in system, but feels his disease is progressive interfering with his ADL's. His wife tells us he asks to come the hospital about 3x week because of SOB. 6. Initial consult note routed to primary continuity provider  7. Communicated plan of care with: Palliative IDT, RN, patient, wife, grand daughters      Patient/Health Care Proxy Stated Goals: Prolong life      TREATMENT PREFERENCES:   Code Status: Full Code    Advance Care Planning:  [x] The [facility] Interdisciplinary Team has updated the ACP Navigator with [address] and Patient Capacity    Primary Decision Maker ([address]): legal medical surrogate decision maker is his wife    Medical Interventions: Full interventions        Other:  As far as possible, the palliative care team has discussed with patient / health care proxy about goals of care / treatment preferences for patient.      HISTORY:     History obtained from:  Patient and chart     CHIEF COMPLAINT: shortness of breath     HPI/SUBJECTIVE:    The patient is:   [x] Verbal and participatory  [] Non-participatory due to:   78year old male who presented to the ER with shortness of breath. Past medical history as above. He worsened overnight requiring intubation. He was able to be successfully extubated to nasal cannula. Goals of care not discussed today as patient very anxious. Clinical Pain Assessment (nonverbal scale for nonverbal patients): Clinical Pain Assessment  Severity: 0     Activity (Movement): Lying quietly, normal position    Duration: for how long has pt been experiencing pain (e.g., 2 days, 1 month, years)  Frequency: how often pain is an issue (e.g., several times per day, once every few days, constant)     FUNCTIONAL ASSESSMENT:     Palliative Performance Scale (PPS):  PPS: 50    ECOG  ECOG Status : Ambulatory, but unable to carry out work activities     PSYCHOSOCIAL/SPIRITUAL SCREENING:      Any spiritual / Quaker concerns:  [] Yes /  [x] No    Caregiver Burnout:  [x] Yes /  [] No /  [] No Caregiver Present      Anticipatory grief assessment:   [] Normal  / [x] Maladaptive        REVIEW OF SYSTEMS:     Positive and pertinent negative findings in ROS are noted above in HPI. The following systems were [x] reviewed / [] unable to be reviewed as noted in HPI  Other findings are noted below. Systems: constitutional, ears/nose/mouth/throat, respiratory, gastrointestinal, genitourinary, musculoskeletal, integumentary, neurologic, psychiatric, endocrine. Positive findings noted below. Modified ESAS Completed by: provider   Fatigue: 3 Drowsiness: 0   Depression: 0 Pain: 0   Anxiety: 7 Nausea: 0   Anorexia: 3 Dyspnea: 0           Stool Occurrence(s): 0        PHYSICAL EXAM:     Wt Readings from Last 3 Encounters:   07/09/19 89.1 kg (196 lb 6.4 oz)   02/09/19 91.4 kg (201 lb 8 oz)   11/23/18 93 kg (205 lb)     Blood pressure 160/88, pulse 88, temperature 98 °F (36.7 °C), resp.  rate 16, height 5' 8\" (1.727 m), weight 89.1 kg (196 lb 6.4 oz), SpO2 94 %. Pain:  Pain Scale 1: Numeric (0 - 10)  Pain Intensity 1: 0              Pain Intervention(s) 1: Medication (see MAR), MD notified (comment), Relaxation technique, Therapeutic presence, Rest  Last bowel movement: none recorded    Constitutional: tearful at times elderly gentleman who is reclining in bed in NAD  Eyes: pupils equal, anicteric  ENMT: no nasal discharge, moist mucous membranes, hearing aide in left ear, however he is still hard of hearing   Respiratory: breathing not currently labored on room air few expiratory wheezes   Skin: warm, dry  Neurologic: alert oriented x 3 , talkative   Psychiatric: full affect, no hallucinations, somewhat anxious, tearful         HISTORY:     Principal Problem:    Pneumonia (7/6/2019)    Active Problems:    COPD with acute exacerbation ([address] (7/6/2019)      Acute respiratory failure with hypoxia (HCC) (7/7/2019)      Advanced care planning/counseling discussion (7/8/2019)      Debility (7/8/2019)      Past Medical History:   Diagnosis Date    Cancer ([address]     prostate    [address]     Hypertension     Pneumonia     Radiation effect       Past Surgical History:   Procedure Laterality Date    HX HERNIA REPAIR        Family History   Problem Relation Age of Onset    Heart Disease Mother      History reviewed, no pertinent family history.   Social History     Tobacco Use    Smoking status: Former Smoker     Types: Cigarettes    Smokeless tobacco: Never Used   Substance Use Topics    Alcohol use: No     Allergies   Allergen Reactions    Aspirin Other (comments)     \"messes my stomach up\"      Current Facility-Administered Medications   Medication Dose Route Frequency    methylPREDNISolone (PF) (Solu-MEDROL) injection 40 mg  40 mg IntraVENous Q6H    Vancomycin Trough due 7/10/19 at 03:30 (30 minutes prior to 04:00 dose)  1 Each Other ONCE    piperacillin-tazobactam (ZOSYN) 3.375 g in 0.9% sodium chloride (MBP/ADV) 100 mL MBP  3.375 g IntraVENous Q6H    ELECTROLYTE REPLACEMENT PROTOCOL - Potassium Standard Dosing   1 Each Other PRN    ELECTROLYTE REPLACEMENT PROTOCOL - Magnesium   1 Each Other PRN    polyvinyl alcohol (LIQUIFILM TEARS) 1.4 % ophthalmic solution 1 Drop  1 Drop Both Eyes PRN    Vancomycin- Pharmacy to dose  1 Each Other Rx Dosing/Monitoring    vancomycin (VANCOCIN) 1250 mg in  ml infusion  1,250 mg IntraVENous Q18H    budesonide (PULMICORT) 500 mcg/2 ml nebulizer suspension  500 mcg Nebulization BID RT    albuterol-ipratropium (DUO-NEB) 2.5 MG-0.5 MG/3 ML  3 mL Nebulization Q4H RT    glucose chewable tablet 16 g  4 Tab Oral PRN    glucagon (GLUCAGEN) injection 1 mg  1 mg IntraMUSCular PRN    dextrose 10% infusion 100 mL  100 mL IntraVENous PRN    levoFLOXacin (LEVAQUIN) 750 mg in D5W IVPB  750 mg IntraVENous Q24H    heparin (porcine) injection 5,000 Units  5,000 Units SubCUTAneous Q8H    acetaminophen (TYLENOL) tablet 650 mg  650 mg Oral Q8H    doxazosin (CARDURA) tablet 1 mg  1 mg Oral DAILY    pantoprazole (PROTONIX) 40 mg in sodium chloride 0.9% 10 mL injection  40 mg IntraVENous DAILY    albuterol (PROVENTIL VENTOLIN) nebulizer solution 2.5 mg  2.5 mg Nebulization Q4H PRN        LAB AND IMAGING FINDINGS:     Lab Results   Component Value Date/Time    WBC 5.2 07/10/2019 04:30 AM    HGB 11.0 (L) 07/10/2019 04:30 AM    PLATELET 511 65/73/9096 04:30 AM     Lab Results   Component Value Date/Time    Sodium 137 07/10/2019 04:30 AM    Potassium 3.7 07/10/2019 04:30 AM    Chloride 103 07/10/2019 04:30 AM    CO2 27 07/10/2019 04:30 AM    BUN 29 (H) 07/10/2019 04:30 AM    Creatinine 1.13 07/10/2019 04:30 AM    Calcium 8.1 (L) 07/10/2019 04:30 AM    Magnesium 2.1 07/09/2019 05:20 AM    Phosphorus 4.2 02/08/2019 08:41 PM      Lab Results   Component Value Date/Time    AST (SGOT) 12 (L) 07/06/2019 05:46 AM    Alk.  phosphatase 87 07/06/2019 05:46 AM    Protein, total 6.1 (L) 07/06/2019 05:46 AM Albumin 3.2 (L) 07/06/2019 05:46 AM    Globulin 2.9 07/06/2019 05:46 AM     Lab Results   Component Value Date/Time    INR 0.9 07/09/2019 05:20 AM    Prothrombin time 12.3 07/09/2019 05:20 AM    aPTT 30.5 10/02/2014 01:32 AM      No results found for: IRON, FE, TIBC, IBCT, PSAT, FERR   No results found for: PH, PCO2, PO2  No components found for: [place]   Lab Results   Component Value Date/Time     07/07/2019 04:00 AM    CK - MB 7.0 (H) 07/07/2019 04:00 AM              Total time: 25  minutes   Counseling / coordination time, spent as noted above: 20 minutes   > 50% counseling / coordination: yes with, patient    Prolonged service was provided for  []30 min   []75 min in face to face time in the presence of the patient, spent as noted above. Time Start:   Time End:   Note: this can only be billed with 30068 (initial) or 85617 (follow up). If multiple start / stop times, list each separately.

## 2019-07-10 NOTE — PROGRESS NOTES
afib with rvr -paf   Transfer to tele , cardizem 10 mg once time dose. Recurrent. Put cardizem gtt -low, echo ordered, put consult for dr. Lula Bartholomew.

## 2019-07-10 NOTE — PROGRESS NOTES
Hospitalist Progress Note    Patient: Dio Lee MRN: [id number]  [facility]: [id number]    YOB: 1945  Age: 74 y.o. Sex: male    DOA: 7/6/2019 LOS:  LOS: 4 days                Assessment/Plan     Patient Active Problem List   Diagnosis Code    COPD (chronic obstructive pulmonary disease) ([address] J44.9    Hypertension I10    Tobacco abuse Z72.0    COPD (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 (HCC) J44.9    Chronic renal disease, stage 3, moderately decreased glomerular filtration rate between 30-59 mL/min/1.73 square meter (HCC) N18.3    COPD exacerbation ([address] J44.1    Asbestosis ([address] J61    Acute exacerbation of chronic obstructive pulmonary disease (COPD) ([address] J44.1    Pneumonia J18.9    COPD with acute exacerbation ([address] J44.1    Acute respiratory failure with hypoxia ([address] J96.01    Advanced care planning/counseling discussion Z70.80   Livia Larch Debility R50.80            73 y/o male with COPD (not on chronic oxygen) who is admitted for acute COPD exacerbation in the setting on RLL PNA. He was started on BiPAP, however his respiratory status worse and he required to be intubated. Extubated 07/08/2019, awake and alert, denies any complains. On oxygen by NC. Resp -   Acute respiratory failure - extubated 07/08/2019,   Acute exacerbation of COPD - on solumedrol, pulmicort and duo neb treatment. CTA chest with no evidence of PE  LLL consolidation vs atelectasis  Chronic peripherally calcified right pleural effusion. ID - blood cultures no growth to date. RLL pneumonia   ANTIBIOTICS - vancomycin, zosyn, levaquin. CVS - Monitor HD. Cardiac enzymes negative    Heme/onc - Follow H&H, plts. .    Renal - Trend BUN, Cr, follow I/O. Check and replace Mg, K, phos. Endocrine -  Follow FSG  DM - on SSI    GI - SLP eval and diet per SLP.     Prophylaxis - DVT: heparin, GI: protonix    ambulate    Disposition : 2-3 days    Review of systems  General: No fevers or chills. Cardiovascular: No chest pain or pressure. No palpitations. Pulmonary: see above   Gastrointestinal: No nausea, vomiting. Physical Exam:  General: Awake, cooperative, no acute distress    HEENT: NC, Atraumatic. PERRLA, anicteric sclerae. Lungs: Decreased breath sounds bilaterally, crackles lower lungs bilaterally. Heart:  S1 S2,  No murmur, No Rubs, No Gallops  Abdomen: Soft, Non distended, Non tender.  +Bowel sounds,   Extremities: No c/c/e  Psych:   Not anxious or agitated. Neurologic:  No acute neurological deficit. Vital signs/Intake and Output:  Visit Vitals  /72   Pulse 84   Temp 98.6 °F (37 °C)   Resp 18   Ht 5' 8\" (1.727 m)   Wt 89.1 kg (196 lb 6.4 oz)   SpO2 96%   BMI 29.86 kg/m²     Current Shift:  07/10 0701 - 07/10 1900  In: 490 [P.O.:240; I.V.:250]  Out: 950 [Urine:950]  Last three shifts:  07/08 1901 - 07/10 0700  In: 900 [P.O.:300; I.V.:600]  Out: 6591 [Urine:4025]            Labs: Results:       Chemistry Recent Labs     07/10/19  0430 07/09/19  0520 07/08/19  2350  07/08/19  0320   * 140*  --   --  148*    138  --   --  137   K 3.7 4.3 3.9   < > 3.1*    104  --   --  106   CO2 27 26  --   --  22   BUN 29* 26*  --   --  22*   CREA 1.13 1.26  --   --  1.11   CA 8.1* 8.1*  --   --  8.2*   AGAP 7 8  --   --  9   BUCR 26* 21*  --   --  20    < > = values in this interval not displayed. CBC w/Diff Recent Labs     07/10/19  0430 07/09/19  0520 07/08/19  0320   WBC 5.2 7.6 9.9   RBC 3.98* 3.92* 4.03*   HGB 11.0* 10.9* 11.2*   HCT 33.5* 32.7* 33.2*    244 246   GRANS 92* 94*  --    LYMPH 4* 3*  --    EOS 0 0  --       Cardiac Enzymes No results for input(s): CPK, CKND1, WILLARD in the last 72 hours.     No lab exists for component: CKRMB, TROIP   Coagulation Recent Labs     07/09/19  0520 07/08/19  0320   PTP 12.3 12.2   INR 0.9 0.9       Lipid Panel No results found for: CHOL, CHOLPOCT, CHOLX, CHLST, CHOLV, 493783, HDL, LDL, LDLC, DLDLP, 210988, VLDLC, VLDL, TGLX, TRIGL, TRIGP, TGLPOCT, CHHD, CHHDX   BNP No results for input(s): BNPP in the last 72 hours. Liver Enzymes No results for input(s): TP, ALB, TBIL, AP, SGOT, GPT in the last 72 hours.     No lab exists for component: DBIL   Thyroid Studies No results found for: T4, T3U, TSH, TSHEXT, TSHEXT     Procedures/imaging: see electronic medical records for all procedures/Xrays and details which were not copied into this note but were reviewed prior to creation of Plan

## 2019-07-11 ENCOUNTER — APPOINTMENT (OUTPATIENT)
Dept: GENERAL RADIOLOGY | Age: 74
DRG: 208 | End: 2019-07-11
Attending: FAMILY MEDICINE
Payer: MEDICARE

## 2019-07-11 ENCOUNTER — APPOINTMENT (OUTPATIENT)
Dept: NON INVASIVE DIAGNOSTICS | Age: 74
DRG: 208 | End: 2019-07-11
Attending: INTERNAL MEDICINE
Payer: MEDICARE

## 2019-07-11 LAB
ANION GAP SERPL CALC-SCNC: 7 MMOL/L (ref 3–18)
BASOPHILS # BLD: 0 K/UL (ref 0–0.1)
BASOPHILS NFR BLD: 0 % (ref 0–2)
BUN SERPL-MCNC: 34 MG/DL (ref 7–18)
BUN/CREAT SERPL: 28 (ref 12–20)
CALCIUM SERPL-MCNC: 7.7 MG/DL (ref 8.5–10.1)
CHLORIDE SERPL-SCNC: 106 MMOL/L (ref 100–108)
CO2 SERPL-SCNC: 27 MMOL/L (ref 21–32)
CREAT SERPL-MCNC: 1.23 MG/DL (ref 0.6–1.3)
DIFFERENTIAL METHOD BLD: ABNORMAL
ECHO AO ASC DIAM: 3.55 CM
ECHO AO ROOT DIAM: 3.48 CM
ECHO AV AREA PEAK VELOCITY: 2.8 CM2
ECHO AV AREA VTI: 3.1 CM2
ECHO AV AREA/BSA PEAK VELOCITY: 1.4 CM2/M2
ECHO AV AREA/BSA VTI: 1.5 CM2/M2
ECHO AV MEAN GRADIENT: 6.6 MMHG
ECHO AV PEAK GRADIENT: 12.5 MMHG
ECHO AV PEAK VELOCITY: 176.47 CM/S
ECHO AV VTI: 33.57 CM
ECHO IVC SNIFF: 1.52 CM
ECHO LA MAJOR AXIS: 3.61 CM
ECHO LA VOL 2C: 47.74 ML (ref 18–58)
ECHO LA VOL 4C: 35.09 ML (ref 18–58)
ECHO LA VOL BP: 45.38 ML (ref 18–58)
ECHO LA VOL/BSA BIPLANE: 22.39 ML/M2 (ref 16–28)
ECHO LA VOLUME INDEX A2C: 23.56 ML/M2 (ref 16–28)
ECHO LA VOLUME INDEX A4C: 17.32 ML/M2 (ref 16–28)
ECHO LV E' LATERAL VELOCITY: 11 CM/S
ECHO LV E' SEPTAL VELOCITY: 7 CM/S
ECHO LV EDV A2C: 54 ML
ECHO LV EDV A4C: 63.6 ML
ECHO LV EDV BP: 65.4 ML (ref 67–155)
ECHO LV EDV INDEX A4C: 31.4 ML/M2
ECHO LV EDV INDEX BP: 32.3 ML/M2
ECHO LV EDV NDEX A2C: 26.6 ML/M2
ECHO LV EJECTION FRACTION A2C: 53 %
ECHO LV EJECTION FRACTION A4C: 59 %
ECHO LV EJECTION FRACTION BIPLANE: 60.8 % (ref 55–100)
ECHO LV ESV A2C: 25.2 ML
ECHO LV ESV A4C: 25.8 ML
ECHO LV ESV BP: 25.6 ML (ref 22–58)
ECHO LV ESV INDEX A2C: 12.4 ML/M2
ECHO LV ESV INDEX A4C: 12.7 ML/M2
ECHO LV ESV INDEX BP: 12.6 ML/M2
ECHO LV INTERNAL DIMENSION DIASTOLIC: 5.11 CM (ref 4.2–5.9)
ECHO LV INTERNAL DIMENSION SYSTOLIC: 3.45 CM
ECHO LV IVSD: 1.06 CM (ref 0.6–1)
ECHO LV MASS 2D: 229.9 G (ref 88–224)
ECHO LV MASS INDEX 2D: 113.5 G/M2 (ref 49–115)
ECHO LV POSTERIOR WALL DIASTOLIC: 1 CM (ref 0.6–1)
ECHO LVOT DIAM: 2.16 CM
ECHO LVOT PEAK GRADIENT: 7.4 MMHG
ECHO LVOT PEAK VELOCITY: 135.81 CM/S
ECHO LVOT VTI: 28.58 CM
ECHO MV A VELOCITY: 116.12 CM/S
ECHO MV AREA PHT: 3.4 CM2
ECHO MV E DECELERATION TIME (DT): 224.2 MS
ECHO MV E VELOCITY: 104.05 CM/S
ECHO MV E/A RATIO: 0.9
ECHO MV E/E' LATERAL: 9.46
ECHO MV E/E' RATIO (AVERAGED): 12.16
ECHO MV E/E' SEPTAL: 14.86
ECHO MV PRESSURE HALF TIME (PHT): 65 MS
ECHO PV MAX VELOCITY: 118.66 CM/S
ECHO PV PEAK GRADIENT: 5.6 MMHG
ECHO RA AREA 4C: 13.65 CM2
ECHO RV INTERNAL DIMENSION: 2.91 CM
ECHO TRICUSPID ANNULAR PEAK SYSTOLIC VELOCITY: 2.3 CM/S
EOSINOPHIL # BLD: 0 K/UL (ref 0–0.4)
EOSINOPHIL NFR BLD: 0 % (ref 0–5)
ERYTHROCYTE [DISTWIDTH] IN BLOOD BY AUTOMATED COUNT: 14.6 % (ref 11.6–14.5)
GLUCOSE BLD STRIP.AUTO-MCNC: 119 MG/DL (ref 70–110)
GLUCOSE BLD STRIP.AUTO-MCNC: 133 MG/DL (ref 70–110)
GLUCOSE BLD STRIP.AUTO-MCNC: 166 MG/DL (ref 70–110)
GLUCOSE BLD STRIP.AUTO-MCNC: 179 MG/DL (ref 70–110)
GLUCOSE SERPL-MCNC: 134 MG/DL (ref 74–99)
HCT VFR BLD AUTO: 35.1 % (ref 36–48)
HGB BLD-MCNC: 11.6 G/DL (ref 13–16)
LYMPHOCYTES # BLD: 0.5 K/UL (ref 0.9–3.6)
LYMPHOCYTES NFR BLD: 7 % (ref 21–52)
MCH RBC QN AUTO: 28 PG (ref 24–34)
MCHC RBC AUTO-ENTMCNC: 33 G/DL (ref 31–37)
MCV RBC AUTO: 84.6 FL (ref 74–97)
MONOCYTES # BLD: 0.5 K/UL (ref 0.05–1.2)
MONOCYTES NFR BLD: 7 % (ref 3–10)
NEUTS SEG # BLD: 5.9 K/UL (ref 1.8–8)
NEUTS SEG NFR BLD: 86 % (ref 40–73)
PLATELET # BLD AUTO: 222 K/UL (ref 135–420)
PMV BLD AUTO: 8.4 FL (ref 9.2–11.8)
POTASSIUM SERPL-SCNC: 4 MMOL/L (ref 3.5–5.5)
RBC # BLD AUTO: 4.15 M/UL (ref 4.7–5.5)
SODIUM SERPL-SCNC: 140 MMOL/L (ref 136–145)
WBC # BLD AUTO: 6.9 K/UL (ref 4.6–13.2)

## 2019-07-11 PROCEDURE — 74011250636 HC RX REV CODE- 250/636: Performed by: FAMILY MEDICINE

## 2019-07-11 PROCEDURE — 74011000250 HC RX REV CODE- 250: Performed by: HOSPITALIST

## 2019-07-11 PROCEDURE — 80048 BASIC METABOLIC PNL TOTAL CA: CPT

## 2019-07-11 PROCEDURE — 82962 GLUCOSE BLOOD TEST: CPT

## 2019-07-11 PROCEDURE — 94640 AIRWAY INHALATION TREATMENT: CPT

## 2019-07-11 PROCEDURE — 74011000258 HC RX REV CODE- 258: Performed by: FAMILY MEDICINE

## 2019-07-11 PROCEDURE — 74230 X-RAY XM SWLNG FUNCJ C+: CPT

## 2019-07-11 PROCEDURE — C9113 INJ PANTOPRAZOLE SODIUM, VIA: HCPCS | Performed by: FAMILY MEDICINE

## 2019-07-11 PROCEDURE — 77010033678 HC OXYGEN DAILY

## 2019-07-11 PROCEDURE — 74011250637 HC RX REV CODE- 250/637: Performed by: FAMILY MEDICINE

## 2019-07-11 PROCEDURE — 85025 COMPLETE CBC W/AUTO DIFF WBC: CPT

## 2019-07-11 PROCEDURE — 74011250637 HC RX REV CODE- 250/637: Performed by: INTERNAL MEDICINE

## 2019-07-11 PROCEDURE — 65660000000 HC RM CCU STEPDOWN

## 2019-07-11 PROCEDURE — 92611 MOTION FLUOROSCOPY/SWALLOW: CPT

## 2019-07-11 PROCEDURE — 97116 GAIT TRAINING THERAPY: CPT

## 2019-07-11 PROCEDURE — 74011250636 HC RX REV CODE- 250/636: Performed by: INTERNAL MEDICINE

## 2019-07-11 PROCEDURE — 74011000250 HC RX REV CODE- 250: Performed by: FAMILY MEDICINE

## 2019-07-11 PROCEDURE — 93306 TTE W/DOPPLER COMPLETE: CPT

## 2019-07-11 PROCEDURE — 94760 N-INVAS EAR/PLS OXIMETRY 1: CPT

## 2019-07-11 PROCEDURE — 74011000255 HC RX REV CODE- 255: Performed by: FAMILY MEDICINE

## 2019-07-11 PROCEDURE — 36415 COLL VENOUS BLD VENIPUNCTURE: CPT

## 2019-07-11 RX ORDER — LEVALBUTEROL 1.25 MG/.5ML
1.25 SOLUTION, CONCENTRATE RESPIRATORY (INHALATION)
Status: DISCONTINUED | OUTPATIENT
Start: 2019-07-11 | End: 2019-07-12 | Stop reason: HOSPADM

## 2019-07-11 RX ADMIN — LEVALBUTEROL 1.25 MG: 1.25 SOLUTION, CONCENTRATE RESPIRATORY (INHALATION) at 20:06

## 2019-07-11 RX ADMIN — ACETAMINOPHEN 650 MG: 325 TABLET ORAL at 17:22

## 2019-07-11 RX ADMIN — METOPROLOL TARTRATE 25 MG: 25 TABLET ORAL at 12:03

## 2019-07-11 RX ADMIN — PIPERACILLIN SODIUM,TAZOBACTAM SODIUM 3.38 G: 3; .375 INJECTION, POWDER, FOR SOLUTION INTRAVENOUS at 06:13

## 2019-07-11 RX ADMIN — IPRATROPIUM BROMIDE AND ALBUTEROL SULFATE 3 ML: .5; 3 SOLUTION RESPIRATORY (INHALATION) at 00:16

## 2019-07-11 RX ADMIN — PIPERACILLIN SODIUM,TAZOBACTAM SODIUM 3.38 G: 3; .375 INJECTION, POWDER, FOR SOLUTION INTRAVENOUS at 12:02

## 2019-07-11 RX ADMIN — ACETAMINOPHEN 650 MG: 325 TABLET ORAL at 00:18

## 2019-07-11 RX ADMIN — ACETAMINOPHEN 650 MG: 325 TABLET ORAL at 12:03

## 2019-07-11 RX ADMIN — PIPERACILLIN SODIUM,TAZOBACTAM SODIUM 3.38 G: 3; .375 INJECTION, POWDER, FOR SOLUTION INTRAVENOUS at 00:18

## 2019-07-11 RX ADMIN — BARIUM SULFATE 700 MG: 700 TABLET ORAL at 11:06

## 2019-07-11 RX ADMIN — DOXAZOSIN 1 MG: 1 TABLET ORAL at 12:03

## 2019-07-11 RX ADMIN — ENOXAPARIN SODIUM 90 MG: 100 INJECTION SUBCUTANEOUS at 19:59

## 2019-07-11 RX ADMIN — METHYLPREDNISOLONE SODIUM SUCCINATE 20 MG: 125 INJECTION, POWDER, FOR SOLUTION INTRAMUSCULAR; INTRAVENOUS at 22:25

## 2019-07-11 RX ADMIN — BARIUM SULFATE 20 ML: 400 PASTE ORAL at 11:06

## 2019-07-11 RX ADMIN — BARIUM SULFATE 40 G: 960 POWDER, FOR SUSPENSION ORAL at 11:06

## 2019-07-11 RX ADMIN — LEVOFLOXACIN 750 MG: 5 INJECTION, SOLUTION INTRAVENOUS at 06:13

## 2019-07-11 RX ADMIN — SODIUM CHLORIDE 40 MG: 9 INJECTION INTRAMUSCULAR; INTRAVENOUS; SUBCUTANEOUS at 12:05

## 2019-07-11 RX ADMIN — ENOXAPARIN SODIUM 90 MG: 100 INJECTION SUBCUTANEOUS at 11:15

## 2019-07-11 RX ADMIN — PIPERACILLIN SODIUM,TAZOBACTAM SODIUM 3.38 G: 3; .375 INJECTION, POWDER, FOR SOLUTION INTRAVENOUS at 17:22

## 2019-07-11 RX ADMIN — METHYLPREDNISOLONE SODIUM SUCCINATE 40 MG: 125 INJECTION, POWDER, FOR SOLUTION INTRAMUSCULAR; INTRAVENOUS at 12:03

## 2019-07-11 RX ADMIN — IPRATROPIUM BROMIDE AND ALBUTEROL SULFATE 3 ML: .5; 3 SOLUTION RESPIRATORY (INHALATION) at 07:16

## 2019-07-11 RX ADMIN — IPRATROPIUM BROMIDE AND ALBUTEROL SULFATE 3 ML: .5; 3 SOLUTION RESPIRATORY (INHALATION) at 11:24

## 2019-07-11 RX ADMIN — BUDESONIDE 500 MCG: 0.5 INHALANT RESPIRATORY (INHALATION) at 07:16

## 2019-07-11 RX ADMIN — IPRATROPIUM BROMIDE AND ALBUTEROL SULFATE 3 ML: .5; 3 SOLUTION RESPIRATORY (INHALATION) at 04:26

## 2019-07-11 RX ADMIN — METOPROLOL TARTRATE 25 MG: 25 TABLET ORAL at 22:24

## 2019-07-11 RX ADMIN — BUDESONIDE 500 MCG: 0.5 INHALANT RESPIRATORY (INHALATION) at 20:06

## 2019-07-11 NOTE — PROGRESS NOTES
Palliative medicine team members Agustin Prince NP, Nicole Mendez MSW and myself provided follow up visit. Mr. Severiano Mccarthy was awake and very pleasant. He stated he was \"feeling a whole lot better\". No family was at bedside. He stated his wife would be coming this afternoon at some time. We explained Advance Directives and Mr. Severiano Mccarthy did want to complete one. We were able to partially complete the AMD. He chose his wife, Tammie Mondragon, as his primary MPOA, but he did not have contact information for the his daughter, Maria De Jesus Camargo whom he had chosen as his secondary MPOA. He asked that we leave the form at bedside for his wife to review and stated he would have her provide Margaret's contact information when she arrived. PM team will follow up for completion. He is currently Full Code  He wants all aggressive treatments continued at this time, but would want only comfort measures if \"nothing else can be done\" or in an irreversible comatose state. PM team will address code status again when wife is present as he still gets extremely emotional very easily.      Samantha Schwarz RN

## 2019-07-11 NOTE — CONSULTS
[facility]: [phone]  LORRAINE MENDITEA [facility]: [phone]   [facility]: [phone]    Patient Name: Galina Mccann  YOB: 1945    Date of Initial Consult: 7/8/2019, follow up 7/9/2019, 7/10/2019, 7/11/2019   Reason for Consult: care decisions   Requesting Provider: Dr Leslie Pierre  Primary Care Physician: Wale Warner MD      SUMMARY:   Galina Mccann is a 74y.o. year old with a past history of prostate cancer, COPD, Asbestosis, hypertension, pneumonia  who was admitted on 7/6/2019 from home with a diagnosis of shortness of breath. Found to have RLL pneumonia and COPD acute exacerbation. He worsened over night, placed on BIPAP but failed and ultimately required oral intubation. He was able to be successfully extubated earlier today. Current medical issues leading to Palliative Medicine involvement include: 74year old male with COPD, asbestos, pleural disease who required intubation for respiratory support. Palliative medicine is consulted for support and goals of care discussion. 7/9/2019 transferred out of ICU, off oxygen, alert reports he is feeling much better. Calm today. 7/10/2019 Mr Laura Pelaez is alert comfortable appearing, reports feeling better. Emotional today with periods of crying. Tells us he has anxiety and this event was  particularly \" scary\" . Currently maintaining off nasal cannula. 7/11/2019 Bright, smiling today, events of last night reviewed. Patient tells us he feeling much better today. Currently on nasal cannula, denies chest pain, reports breathing comfortably. Working with PT. \" I am weak\" but going to a facility to help with strength. He completed an AMD today. PALLIATIVE DIAGNOSES:   1. Advanced care plan discussion   2. COPD   3. Shortness of breath / acute respiratory failure   4. Debility        PLAN:   1. 7/11/2019 follow up along with TAMI Otrega he is alert oriented and smiling.  \" I feel better today, but a little weak. Denies pain, no shortness of breath, currently on nasal cannula. Introduced and discussed completion of an AMD. Mr Domenico Larson is agreeable. He has named his wife Nahun Mesa as primary and his daughter Samy Alexandra as secondary MPOA. His medical wishes include no life prolonging procedures and treatments at the end of life. In the event of severe head injury or large CVA, which could render him very functionally impaired and unaware of his surroundings he would not wish for life prolonging treatments or procedures. His current goals of care; full code with full interventions. Plan SNF upon d/c. ( below are previous conversations)     2. 7/10/2019 follow up along with Ms Tamara RN and TAMI Zazueta. Patient is alert oriented x 3, verbal. Reports feeling better. Currently, breathing comfortably off nasal cannula. He is a bit more anxious this am. We spent time with listening support. Per patient he has high anxiety at home. Tearful at times. Speech therapy at bedside and appreciate assistance. Plan for MBS tomorrow. Bedside swallow by ST safe for thins, solids. Encouraged follow up with his PCP and pulmonology as o/p. He will discuss with PCP potential for antidepressant to with management of anxiety. Goals of care in depth conversation currently difficult due to anxiety and high emotions. We continue to offer support to patient. Family not currently at bedside. Goals of care full code with full interventions      3. 7/9/2019 follow up along with TAMI Zazueta. Mr Domenico Larson has been transferred out of ICU to [facility]. He reports doing much better. Maintaining oxygen saturations on room air. He feels less anxious today, but still tearful at times when  his family leaves. Spoke with his wife and granddaughter. Wife shares he has completed a \" document\" which denotes his medical wishes, she reports  it directs for no long term ventilator support. This is his first intubation of which he has little memory. Family aware his lungs are not healthy there may come a time in which if he is intubated weaning from the vent maybe difficult, . Offered support to patient and family today. Goals of care not currently changed full code with full interventions. Discussed with wife and granddaughter encouraged  follow up with his PCP to  discuss options of anti depressant with anxiolytic properties if PCP, patient and family think appropriate and beneficial. Wife shares he seems to do well a short time after hospitalization, ER visits or PCP visits, then becomes highly anxious, short of breath and wants to return to ER. Difficult to tease out if at times shortness of breath is prompting anxiety or if anxiety prompting shortness of breath. We also highly encouraged o/p pulmonology follow up   7/8/2019   Advanced care plan discussion patient has reportedly competed an AMD but not in our system. Seen today along with Ms Philip Mike, TAMI and Ms Master's RN. We saw post extubation. He is alert and oriented, anxious but very pleasant gentleman. His wife and 2 grand children at bedside. . Patient is tearful saying he is worse this time \" I am afraid I will not make it\" Offered support to patient and wife who was also tearful. We did not discuss goals of care with Mr Lefty Barillas today as he is very anxious at time of our visit. We did speak with his wife and family who agreed with waiting until tomorrow and patient a bit more relaxed and less tearful. Current goals of care full code with full interventions. 4. COPD former tobacco user, not on chronic O2 at home. Patient reports he has been getting more short breath at home and it is very distressing to him. He feels his disease is progressing. 5. Shortness of breath maintaining good O2 sats on 2 liters post extubation. He tells us he currently does not feel short of breath. Prior to this event he has never been intubated   6. Debility enjoys foot ball.  Tells us he has frequent hospitalizations but did not see in system, but feels his disease is progressive interfering with his ADL's. His wife tells us he asks to come the hospital about 3x week because of SOB. 7. Initial consult note routed to primary continuity provider  8. Communicated plan of care with: Palliative IDT, RN, patient, wife, grand daughters      Patient/Health Care Proxy Stated Goals: Prolong life      TREATMENT PREFERENCES:   Code Status: Full Code    Advance Care Planning:  [x] The [facility] Interdisciplinary Team has updated the ACP Navigator with [address] and Patient Capacity    Primary Decision Maker (Shree Mendoza):PATI [facility]     Medical Interventions: Full interventions        Other:  As far as possible, the palliative care team has discussed with patient / health care proxy about goals of care / treatment preferences for patient. HISTORY:     History obtained from:  Patient and chart     CHIEF COMPLAINT: shortness of breath     HPI/SUBJECTIVE:    The patient is:   [x] Verbal and participatory  [] Non-participatory due to:   78year old male who presented to the ER with shortness of breath. Past medical history as above. He worsened overnight requiring intubation. He was able to be successfully extubated to nasal cannula. Goals of care not discussed today as patient very anxious.      Clinical Pain Assessment (nonverbal scale for nonverbal patients): Clinical Pain Assessment  Severity: 0     Activity (Movement): Lying quietly, normal position    Duration: for how long has pt been experiencing pain (e.g., 2 days, 1 month, years)  Frequency: how often pain is an issue (e.g., several times per day, once every few days, constant)     FUNCTIONAL ASSESSMENT:     Palliative Performance Scale (PPS):  PPS: 50    ECOG  ECOG Status : Ambulatory, but unable to carry out work activities     PSYCHOSOCIAL/SPIRITUAL SCREENING:      Any spiritual / Baptist concerns:  [] Yes / [x] No    Caregiver Burnout:  [x] Yes /  [] No /  [] No Caregiver Present      Anticipatory grief assessment:   [] Normal  / [x] Maladaptive        REVIEW OF SYSTEMS:     Positive and pertinent negative findings in ROS are noted above in HPI. The following systems were [x] reviewed / [] unable to be reviewed as noted in HPI  Other findings are noted below. Systems: constitutional, ears/nose/mouth/throat, respiratory, gastrointestinal, genitourinary, musculoskeletal, integumentary, neurologic, psychiatric, endocrine. Positive findings noted below. Modified ESAS Completed by: provider   Fatigue: 3 Drowsiness: 0   Depression: 0 Pain: 0   Anxiety: 2 Nausea: 0   Anorexia: 2 Dyspnea: 0           Stool Occurrence(s): 1        PHYSICAL EXAM:     Wt Readings from Last 3 Encounters:   07/11/19 88.9 kg (196 lb)   02/09/19 91.4 kg (201 lb 8 oz)   11/23/18 93 kg (205 lb)     Blood pressure 154/74, pulse 77, temperature 97.8 °F (36.6 °C), resp. rate 16, height 5' 8\" (1.727 m), weight 88.9 kg (196 lb), SpO2 99 %. Pain:  Pain Scale 1: Numeric (0 - 10)  Pain Intensity 1: 0     Pain Location 1: Chest  Pain Orientation 1: Upper  Pain Description 1: Pressure  Pain Intervention(s) 1: MD notified (comment)  Last bowel movement: x 1 today     Constitutional: reclining in bed, alert, smiling in NAD   Eyes: pupils equal, anicteric  ENMT: no nasal discharge, moist mucous membranes, hearing aide in left ear, however he is still hard of hearing   Respiratory: breathing not currently labored.  Occ cough on nasal cannula   Skin: warm, dry  Neurologic: alert oriented x 3 , talkative   Psychiatric: full affect, no hallucinations, somewhat anxious, tearful         HISTORY:     Principal Problem:    Pneumonia (7/6/2019)    Active Problems:    COPD with acute exacerbation ([address] (7/6/2019)      Acute respiratory failure with hypoxia ([address] (7/7/2019)      Advanced care planning/counseling discussion (7/8/2019)      Debility (7/8/2019)      Past Medical History:   Diagnosis Date    Cancer [facility])     prostate    [address]     Hypertension     Pneumonia     Radiation effect       Past Surgical History:   Procedure Laterality Date    HX HERNIA REPAIR        Family History   Problem Relation Age of Onset    Heart Disease Mother      History reviewed, no pertinent family history.   Social History     Tobacco Use    Smoking status: Former Smoker     Types: Cigarettes    Smokeless tobacco: Never Used   Substance Use Topics    Alcohol use: No     Allergies   Allergen Reactions    Aspirin Other (comments)     \"messes my stomach up\"      Current Facility-Administered Medications   Medication Dose Route Frequency    perflutren lipid microspheres (DEFINITY) in NS bolus IV  1 mL IntraVENous RAD ONCE    methylPREDNISolone (PF) (Solu-MEDROL) injection 40 mg  40 mg IntraVENous Q12H    enoxaparin (LOVENOX) injection 90 mg  1 mg/kg SubCUTAneous Q12H    metoprolol tartrate (LOPRESSOR) tablet 25 mg  25 mg Oral Q12H    piperacillin-tazobactam (ZOSYN) 3.375 g in 0.9% sodium chloride (MBP/ADV) 100 mL MBP  3.375 g IntraVENous Q6H    ELECTROLYTE REPLACEMENT PROTOCOL - Potassium Standard Dosing   1 Each Other PRN    ELECTROLYTE REPLACEMENT PROTOCOL - Magnesium   1 Each Other PRN    polyvinyl alcohol (LIQUIFILM TEARS) 1.4 % ophthalmic solution 1 Drop  1 Drop Both Eyes PRN    Vancomycin- Pharmacy to dose  1 Each Other Rx Dosing/Monitoring    budesonide (PULMICORT) 500 mcg/2 ml nebulizer suspension  500 mcg Nebulization BID RT    albuterol-ipratropium (DUO-NEB) 2.5 MG-0.5 MG/3 ML  3 mL Nebulization Q4H RT    glucose chewable tablet 16 g  4 Tab Oral PRN    glucagon (GLUCAGEN) injection 1 mg  1 mg IntraMUSCular PRN    dextrose 10% infusion 100 mL  100 mL IntraVENous PRN    levoFLOXacin (LEVAQUIN) 750 mg in D5W IVPB  750 mg IntraVENous Q24H    acetaminophen (TYLENOL) tablet 650 mg  650 mg Oral Q8H    doxazosin (CARDURA) tablet 1 mg  1 mg Oral DAILY    pantoprazole (PROTONIX) 40 mg in sodium chloride 0.9% 10 mL injection  40 mg IntraVENous DAILY    albuterol (PROVENTIL VENTOLIN) nebulizer solution 2.5 mg  2.5 mg Nebulization Q4H PRN        LAB AND IMAGING FINDINGS:     Lab Results   Component Value Date/Time    WBC 6.9 07/11/2019 05:34 AM    HGB 11.6 (L) 07/11/2019 05:34 AM    PLATELET 126 48/12/1503 05:34 AM     Lab Results   Component Value Date/Time    Sodium 140 07/11/2019 05:34 AM    Potassium 4.0 07/11/2019 05:34 AM    Chloride 106 07/11/2019 05:34 AM    CO2 27 07/11/2019 05:34 AM    BUN 34 (H) 07/11/2019 05:34 AM    Creatinine 1.23 07/11/2019 05:34 AM    Calcium 7.7 (L) 07/11/2019 05:34 AM    Magnesium 2.1 07/10/2019 05:15 PM    Phosphorus 4.2 02/08/2019 08:41 PM      Lab Results   Component Value Date/Time    AST (SGOT) 12 (L) 07/06/2019 05:46 AM    Alk. phosphatase 87 07/06/2019 05:46 AM    Protein, total 6.1 (L) 07/06/2019 05:46 AM    Albumin 3.2 (L) 07/06/2019 05:46 AM    Globulin 2.9 07/06/2019 05:46 AM     Lab Results   Component Value Date/Time    INR 0.9 07/09/2019 05:20 AM    Prothrombin time 12.3 07/09/2019 05:20 AM    aPTT 30.5 10/02/2014 01:32 AM      No results found for: IRON, FE, TIBC, IBCT, PSAT, FERR   No results found for: PH, PCO2, PO2  No components found for: [place]   Lab Results   Component Value Date/Time    CK 44 07/10/2019 05:15 PM    CK - MB 2.2 07/10/2019 05:15 PM              Total time: 35 minutes   Counseling / coordination time, spent as noted above: 25  minutes   > 50% counseling / coordination: yes with, patient    Prolonged service was provided for  []30 min   []75 min in face to face time in the presence of the patient, spent as noted above. Time Start:   Time End:   Note: this can only be billed with 23047 (initial) or 58465 (follow up). If multiple start / stop times, list each separately.

## 2019-07-11 NOTE — PROGRESS NOTES
Transition of Care Plan:     Communication to Patient/Family:  Met with patient and family, and they are agreeable to the transition plan. Met with Dr López Yuan patient is not ready for d/c today however, when ready for d/c    SNF/Rehab Transition:  Patient has been accepted to [facility] at [address] for SNF and meets criteria for admission. Patient will transported by tbd and expected to leave at tbd    Communication to SNF/Rehab:  Bedside RN,  has been notified to update the transition plan to the facility and call report [phone]. Discharge information has been updated on the AVS and communicated through [facility] or [facility] Link. Discharge instructions to be fax'd to facility at [phone]     Please include all hard scripts for controlled substances, med rec and dc summary, and AVS in packet. Please medicate for pain prior to dc if possible and needed to help offset delay when patient first arrives to facility. Reviewed and confirmed with facility, [facility] can manage the patient care needs for the following:     Shilpa Baum with (X) only those applicable:  Medication:  []Medications are available at the facility  []IV Antibiotics    []Controlled Substance - hard copies available sent. []Weekly Labs    Equipment:  []CPAP/BiPAP  []Wound Vacuum  []Humphreys or Urinary Device  []PICC/Central Line  []Nebulizer  []Ventilator    Treatment:  []Isolation (for MRSA, VRE, etc.)  []Surgical Drain Management  []Tracheostomy Care  []Dressing Changes  []Dialysis with transportation  []PEG Care  []Oxygen  []Daily Weights for Heart Failure    Dietary:  []Any diet limitations  []Tube Feedings   []Total Parenteral Management (TPN)    Financial Resources:  []Medicaid Application Completed    [x]UAI Completed and copy given to pt/family. []A screening has previously been completed.     []Level II Completed    [] Private pay individual who will not become   financially eligible for Medicaid within 6 months from admission to a [facility] facility. [] Individual refused to have screening conducted. []Medicaid Application Completed    []The screening denied because it was determined individual did not need/did not qualify for nursing facility level of care. [] Out of state residents seeking direct admission to a [address] facility. [] Individuals who are inpatients of an out of state hospital, or in state or out of state veterans/ hospital and seek direct admission to a [address] facility  [] Individuals who are pateints or residents of a state owned/operated facility that is licensed by Department of [employer] and seek direct admission to [address] [place] [address]  [] A screening not required for enrollment in [address] [address] [address] services as set out in [address] [place] [address] 30-  [] [facility] staff shall perform screenings of the [facility] clients. Copy sent to . Copy place in chart. Cms sent copy to [facility]   Advanced Care Plan:  []Surrogate Decision Maker of Care  []POA  []Communicated Code Status and copy sent. Other:       Care Management Interventions  PCP Verified by CM:  Yes  Palliative Care Criteria Met (RRAT>21 & CHF Dx)?: Yes  Mode of Transport at Discharge: BLS  Transition of Care Consult (CM Consult): SNF  Partner SNF: Yes  Health Maintenance Reviewed: Yes  Physical Therapy Consult: Yes  Occupational Therapy Consult: Yes  Speech Therapy Consult: Yes  Current Support Network: Lives with Spouse, Family Lives Nearby  Confirm Follow Up Transport: Family  Plan discussed with Pt/Family/Caregiver: Yes  Freedom of Choice Offered: Yes  Discharge Location  Discharge Placement: Skilled nursing facility

## 2019-07-11 NOTE — ROUTINE PROCESS
Bedside and Verbal shift change report given to VINCE Maldonado RN (oncoming nurse) by Tarik Cartwright RN (offgoing nurse). Report included the following information SBAR, Kardex, Intake/Output, MAR and Recent Results.

## 2019-07-11 NOTE — PROGRESS NOTES
Problem: Dysphagia (Adult)  Goal: *Acute Goals and Plan of Care (Insert Text)  Description  Recommendations:  Diet: Soft solid/thin liquid   Meds: Per patient preference  Aspiration Precautions  Oral Care TID    Goals:  Patient will:  1. Tolerate PO trials with 0 s/s overt distress in 4/5 trials (goal met)  2. Utilize compensatory swallow strategies/maneuvers (decrease bite/sip, size/rate, alt. liq/sol) with min cues in 4/5 trials (goal met)  3. Perform oral-motor/laryngeal exercises to increase oropharyngeal swallow function with min cues (goal N/A)  4. Complete an objective swallow study (i.e., MBSS) to assess swallow integrity, r/o aspiration, and determine of safest LRD, min A (goal met 7/11/19)     Outcome: Resolved/Met     SPEECH PATHOLOGY MODIFIED BARIUM SWALLOW STUDY, TREATMENT, & DISCHARGE    Patient: Dio Lee (8 y.o. male)  Date: 7/11/2019  Primary Diagnosis: Pneumonia [J18.9]  COPD with acute exacerbation ([address] [J44.1]  Pneumonia [J18.9]  COPD with acute exacerbation ([address] [J44.1]        Precautions: Aspiration  Fall(SPO2)  PLOF:soft solid/thin liquids    ASSESSMENT :  Based on the objective data described below, the patient presents with mild oral dysphagia. Pt taking PO trials of thin liquid via cup/straw and tandem drinking, puree, solid, and pill with thin liquid wash without aspiration or penetration. Pt with increased mastication time with solid texture. Swallow characterized by functional laryngeal elevation and timely swallow initiation. Recommend continue soft solid diet with thin liquid, meds per pt preference. Treatment:  Skilled therapy initiated: Educated pt on MBS results, aspiration precautions, and importance of compensatory swallow techniques to decrease aspiration risk (decrease rate of intake & sip/bite size, upright @HOB for all po intake and ~30 minutes after po); verbalized comprehension. Maximum therapeutic gains met.  Safest least restrictive diet achieved in current inpatient setting. SLP will sign off at this time. Please reconsult should further concerns arise. PLAN :  Recommendations:  See above. Discharge Recommendations: None re: ST.     SUBJECTIVE:   Patient stated I feel like I don't do well with big pills. OBJECTIVE:     Past Medical History:   Diagnosis Date    Cancer [facility])     prostate    [facility] disease) [address]     Hypertension     Pneumonia     Radiation effect      Past Surgical History:   Procedure Laterality Date    HX HERNIA REPAIR       Home Situation:   Home Situation  Home Environment: Private residence  # Steps to Enter: 6  Rails to Enter: Yes  Hand Rails : Bilateral  One/Two Story Residence: One story  Living Alone: No  Support Systems: Spouse/Significant Other/Partner  Patient Expects to be Discharged to[de-identified] Unknown  Current DME Used/Available at Home: Loralie , rollator, Cane, straight, Nebulizer  Tub or Shower Type: Tub/Shower combination  Diet prior to admission: soft/thin  Current Diet:  Soft/thin   Radiologist:    Film Views: Lateral  Patient Position: 90* sitting in chair    Trial 1: Trial 2:   Consistency Presented: Thin liquid;Puree;Pill/Tablet Consistency Presented: Solid   How Presented: SLP-fed/presented;Straw;Cup/sip; Successive swallows;Spoon How Presented: SLP-fed/presented;Spoon   Consistency Amount: 70 Consistency Amount: 10   Bolus Acceptance: No impairment Bolus Acceptance: No impairment   Bolus Formation/Control: No impairment:   Bolus Formation/Control: Impaired: Delayed;Mastication   Propulsion: No impairment Propulsion: No impairment   Oral Residue: None Oral Residue: None   Initiation of Swallow: Triggered at vallecula Initiation of Swallow: Triggered at valleculae   Timing: No impairment Timing: No impairment   Penetration: None Penetration: None   Aspiration/Timing: No evidence of aspiration Aspiration/Timing: No evidence of aspiration   Pharyngeal Clearance: No residue Pharyngeal Clearance: No residue   Attempted Modifications: Small sips and bites Attempted Modifications: Small sips and bites   Effective Modifications: Small sips and bites     Cues for Modifications: Minimal Cues for Modifications: Minimal           Decreased Tongue Base Retraction?: No  Laryngeal Elevation: WFL (within functional limits)  Aspiration/Penetration Score: 1 (No penetration or aspiration-Contrast does not enter the airway)  Pharyngeal Symmetry: Not assessed  Pharyngeal-Esophageal Segment: No impairment  Pharyngeal Dysfunction: None    Oral Phase Severity: Mild  Pharyngeal Phase Severity: N/A    8-point Penetration-Aspiration Scale: Score 1    PAIN:  Pain level pre-treatment: 0/10   Pain level post-treatment: 0/10   Pain Intervention(s): NA  Response to intervention: NA      COMMUNICATION/EDUCATION:   X Patient educated regarding MBS results and diet recommendations. X Patient/family have participated as able in goal setting and plan of care. X Patient/family agree to work toward stated goals and plan of care. ?  Patient understands intent and goals of therapy, but is neutral about his/her participation. ? Patient is unable to participate in goal setting and plan of care.     Thank you for this referral.    Leno Low M.S., CCC-SLP  Speech-Language Pathologist    Time Calculation: 16 mins  MBS Time: 8 minutes  Treatment Time: 8 minutes

## 2019-07-11 NOTE — PROGRESS NOTES
Hospitalist Progress Note    Patient: Farhan Nino MRN: [id number]  CSN: [id number]    YOB: 1945  Age: 74 y.o. Sex: male    DOA: 7/6/2019 LOS:  LOS: 5 days                Assessment/Plan     Patient Active Problem List   Diagnosis Code    COPD (chronic obstructive pulmonary disease) ([address] J44.9    Hypertension I10    Tobacco abuse Z72.0    COPD (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 (HCC) J44.9    Chronic renal disease, stage 3, moderately decreased glomerular filtration rate between 30-59 mL/min/1.73 square meter (HCC) N18.3    COPD exacerbation ([address] J44.1    Asbestosis ([address] J61    Acute exacerbation of chronic obstructive pulmonary disease (COPD) ([address] J44.1    Pneumonia J18.9    COPD with acute exacerbation ([address] J44.1    Acute respiratory failure with hypoxia ([address] J96.01    Advanced care planning/counseling discussion Z70.80   Colbert Debility R50.80            73 y/o male with COPD (not on chronic oxygen) who is admitted for acute COPD exacerbation in the setting on RLL PNA. He was started on BiPAP, however his respiratory status worse and he required to be intubated. Extubated 07/08/2019, awake and alert, denies any complains. On oxygen by NC. Resp -   Acute respiratory failure - extubated 07/08/2019,   Acute exacerbation of COPD - on solumedrol, pulmicort. Stop duo neb due to A-fib and start on xopenex. CTA chest with no evidence of PE  LLL consolidation vs atelectasis  Chronic peripherally calcified right pleural effusion. ID - blood cultures no growth to date. RLL pneumonia   ANTIBIOTICS - vancomycin, zosyn, levaquin. CVS - Monitor HD. A-fib with RVR, on cardizem, betablocker. Stop albuterol  Cardiology consulted,  Follow echo  Cardiac enzymes negative    Heme/onc - Follow H&H, plts. .    Renal - Trend BUN, Cr, follow I/O. Check and replace Mg, K, phos.     Endocrine -  Follow FSG  DM - on SSI    GI - SLP eval and diet per SLP.    Prophylaxis - DVT: heparin, GI: protonix    ambulate    Disposition : 2-3 days    Review of systems  General: No fevers or chills. Cardiovascular: No chest pain or pressure. No palpitations. Pulmonary: see above   Gastrointestinal: No nausea, vomiting. Physical Exam:  General: Awake, cooperative, no acute distress    HEENT: NC, Atraumatic. PERRLA, anicteric sclerae. Lungs: Decreased breath sounds bilaterally, crackles lower lungs bilaterally. Heart:  S1 S2,  No murmur, No Rubs, No Gallops  Abdomen: Soft, Non distended, Non tender.  +Bowel sounds,   Extremities: No c/c/e  Psych:   Not anxious or agitated. Neurologic:  No acute neurological deficit. Vital signs/Intake and Output:  Visit Vitals  /57   Pulse 84   Temp 98 °F (36.7 °C)   Resp 16   Ht 5' 8\" (1.727 m)   Wt 88.9 kg (196 lb)   SpO2 97%   BMI 29.80 kg/m²     Current Shift:  07/11 0701 - 07/11 1900  In: 720 [P.O.:720]  Out: 100 [Urine:100]  Last three shifts:  07/09 1901 - 07/11 0700  In: 4144 [P.O.:240; I.V.:850]  Out: 3750 [Urine:3750]            Labs: Results:       Chemistry Recent Labs     07/11/19  0534 07/10/19  0430 07/09/19  0520   * 132* 140*    137 138   K 4.0 3.7 4.3    103 104   CO2 27 27 26   BUN 34* 29* 26*   CREA 1.23 1.13 1.26   CA 7.7* 8.1* 8.1*   AGAP 7 7 8   BUCR 28* 26* 21*      CBC w/Diff Recent Labs     07/11/19  0534 07/10/19  0430 07/09/19  0520   WBC 6.9 5.2 7.6   RBC 4.15* 3.98* 3.92*   HGB 11.6* 11.0* 10.9*   HCT 35.1* 33.5* 32.7*    247 244   GRANS 86* 92* 94*   LYMPH 7* 4* 3*   EOS 0 0 0      Cardiac Enzymes Recent Labs     07/10/19  1715   CPK 44   CKND1 5.0*      Coagulation Recent Labs     07/09/19  0520   PTP 12.3   INR 0.9       Lipid Panel No results found for: CHOL, CHOLPOCT, CHOLX, CHLST, CHOLV, 213548, HDL, LDL, LDLC, DLDLP, 953081, VLDLC, VLDL, TGLX, TRIGL, TRIGP, TGLPOCT, CHHD, CHHDX   BNP No results for input(s): BNPP in the last 72 hours.    Liver Enzymes No results for input(s): TP, ALB, TBIL, AP, SGOT, GPT in the last 72 hours.     No lab exists for component: DBIL   Thyroid Studies No results found for: T4, T3U, TSH, TSHEXT, TSHEXT     Procedures/imaging: see electronic medical records for all procedures/Xrays and details which were not copied into this note but were reviewed prior to creation of Plan

## 2019-07-11 NOTE — PROGRESS NOTES
Cardiology Progress Note        Patient: Linda Cleveland        Sex: male          DOA: 7/6/2019  YOB: 1945      Age:  74 y.o.        LOS:  LOS: 5 days    Patient seen and examined, chart reviewed. Assessment/Plan     Patient Active Problem List   Diagnosis Code    COPD (chronic obstructive pulmonary disease) ([facility] [address] J44.9    Hypertension I10    Tobacco abuse Z72.0    COPD (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 (HCC) J44.9    Chronic renal disease, stage 3, moderately decreased glomerular filtration rate between 30-59 mL/min/1.73 square meter (HCC) N18.3    COPD exacerbation ([facility] [address] J44.1    Asbestosis ([facility] [address] J61    Acute exacerbation of chronic obstructive pulmonary disease (COPD) ([address] J44.1    Pneumonia J18.9    COPD with acute exacerbation ([address] J44.1    Acute respiratory failure with hypoxia ([facility] [address] J96.01    Advanced care planning/counseling discussion Z71.89    Debility R53.81      Paroxysmal atrial fibrillation CHADsVASc score is 3.0  Nonsustained ventricular tachycardia    Telemetry monitor reviewed: no further episode of NSVT or paroxysmal atrial fibrillation    Echocardiogram revealed no wall motion abnormality and normal LVEF. Plan:      Continue metoprolol tartrate 25 mg by mouth twice a day. Continue Lovenox 1 mg per KG subcutaneous every 12 hours. Discussed with patient and family member about various oral anticoagulant agents. All risks, benefits and alternatives of anticoagulation therapy discussed with patient and family member. Patient and family member agree for newer oral anticoagulant agent. Avoid beta 2 agonist nebulizers  Keep potassium around 4 and magnesium level around 2  Continue management as per hospital medicine  Continue management as per Pulmonologist.               Subjective:    cc:  Denies any chest pain. REVIEW OF SYSTEMS:     General: No fevers or chills.   Cardiovascular: No chest pain,No palpitations, No orthopnea, No PND, No leg swelling, No claudication  Pulmonary: positive dyspnea. Gastrointestinal: No nausea, vomiting, bleeding  Neurology: No Dizziness    Objective:      Visit Vitals  /69   Pulse 78   Temp 98.2 °F (36.8 °C)   Resp 16   Ht 5' 8\" (1.727 m)   Wt 88.9 kg (196 lb)   SpO2 98%   BMI 29.80 kg/m²     Body mass index is 29.8 kg/m². Physical Exam:  General Appearance: Comfortable, not using accessory muscles of respiration. HEENT: TAMI. HEAD: Atraumatic  NECK: No JVD, no thyroidomeglay. CAROTIDS:no bruit  LUNGS: bilateral mild expiratory wheezing, no crepitation  HEART: S1+S2 audible, no murmur, no pericardial rub. ABD: Non-tender, BS Audible    EXT: No edema, and no cyanosis. VASCULAR EXAM: Pulses are intact. PSYCHIATRIC EXAM: Mood is appropriate. MUSCULOSKELETAL: Grossly no joint deformity.   NEUROLOGICAL: AAO times 3, No motor and sensory deficit  Medication:  Current Facility-Administered Medications   Medication Dose Route Frequency    perflutren lipid microspheres (DEFINITY) in NS bolus IV  1 mL IntraVENous RAD ONCE    levalbuterol (XOPENEX) nebulizer soln 1.25 mg/0.5 ml  1.25 mg Nebulization Q4H PRN    methylPREDNISolone (PF) (Solu-MEDROL) injection 20 mg  20 mg IntraVENous Q12H    enoxaparin (LOVENOX) injection 90 mg  1 mg/kg SubCUTAneous Q12H    metoprolol tartrate (LOPRESSOR) tablet 25 mg  25 mg Oral Q12H    piperacillin-tazobactam (ZOSYN) 3.375 g in 0.9% sodium chloride (MBP/ADV) 100 mL MBP  3.375 g IntraVENous Q6H    ELECTROLYTE REPLACEMENT PROTOCOL - Potassium Standard Dosing   1 Each Other PRN    ELECTROLYTE REPLACEMENT PROTOCOL - Magnesium   1 Each Other PRN    polyvinyl alcohol (LIQUIFILM TEARS) 1.4 % ophthalmic solution 1 Drop  1 Drop Both Eyes PRN    budesonide (PULMICORT) 500 mcg/2 ml nebulizer suspension  500 mcg Nebulization BID RT    glucose chewable tablet 16 g  4 Tab Oral PRN    glucagon (GLUCAGEN) injection 1 mg  1 mg IntraMUSCular PRN    dextrose 10% infusion 100 mL  100 mL IntraVENous PRN    levoFLOXacin (LEVAQUIN) 750 mg in D5W IVPB  750 mg IntraVENous Q24H    acetaminophen (TYLENOL) tablet 650 mg  650 mg Oral Q8H    doxazosin (CARDURA) tablet 1 mg  1 mg Oral DAILY    pantoprazole (PROTONIX) 40 mg in sodium chloride 0.9% 10 mL injection  40 mg IntraVENous DAILY               Lab/Data Reviewed:       Recent Labs     07/11/19  0534 07/10/19  0430 07/09/19  0520   WBC 6.9 5.2 7.6   HGB 11.6* 11.0* 10.9*   HCT 35.1* 33.5* 32.7*    247 244     Recent Labs     07/11/19  0534 07/10/19  0430 07/09/19  0520    137 138   K 4.0 3.7 4.3    103 104   CO2 27 27 26   * 132* 140*   BUN 34* 29* 26*   CREA 1.23 1.13 1.26   CA 7.7* 8.1* 8.1*     Total time spent 35 minutes.      Signed By: Yohana Zamora MD     July 11, 2019

## 2019-07-11 NOTE — CONSULTS
[facility] Consult Note      Patient: Jose Cruz Ling MRN: [id number]  SSN: [id number]    YOB: 1945  Age: 74 y.o. Sex: male    Date of Consultation: 07/10/2019  Referring Physician: Sherryle Caldwell MD  Reason for Consultation: Chest pain, Atrial fibrillation    Chief complain: Shortness of breath    HPI: 74-year-old gentleman came with worsening of shortness of breath and being managed for COPD exacerbation. During hospitalization he did not improve with non invasive ventilatory support and required intubation and mechanical ventilation. He was extubated successfully. Cardiology consult called for atrial fibrillation. Patient developed chest pain. Chest pain was midsternal, pressure like and lasted for 15 minutes this afternoon. He had similar episode a few weeks back. He denies any chest pain on exertion. He is minimally ambulatory. Is complaining of shortness of breath on exertion for long time. He denies any orthopnea or PND. He denies any current smoking or alcohol abuse. He denies any family history of premature coronary artery disease.     Past Medical History:   Diagnosis Date    Cancer [facility])     prostate    COPD (chronic obstructive pulmonary disease) ([facility] [address]     Hypertension     Pneumonia     Radiation effect      Past Surgical History:   Procedure Laterality Date    HX HERNIA REPAIR       Current Facility-Administered Medications   Medication Dose Route Frequency    methylPREDNISolone (PF) (Solu-MEDROL) injection 40 mg  40 mg IntraVENous Q12H    enoxaparin (LOVENOX) injection 90 mg  1 mg/kg SubCUTAneous Q12H    metoprolol tartrate (LOPRESSOR) tablet 25 mg  25 mg Oral Q12H    piperacillin-tazobactam (ZOSYN) 3.375 g in 0.9% sodium chloride (MBP/ADV) 100 mL MBP  3.375 g IntraVENous Q6H    ELECTROLYTE REPLACEMENT PROTOCOL - Potassium Standard Dosing   1 Each Other PRN    ELECTROLYTE REPLACEMENT PROTOCOL - Magnesium   1 Each Other PRN    polyvinyl alcohol (LIQUIFILM TEARS) 1.4 % ophthalmic solution 1 Drop  1 Drop Both Eyes PRN    Vancomycin- Pharmacy to dose  1 Each Other Rx Dosing/Monitoring    budesonide (PULMICORT) 500 mcg/2 ml nebulizer suspension  500 mcg Nebulization BID RT    albuterol-ipratropium (DUO-NEB) 2.5 MG-0.5 MG/3 ML  3 mL Nebulization Q4H RT    glucose chewable tablet 16 g  4 Tab Oral PRN    glucagon (GLUCAGEN) injection 1 mg  1 mg IntraMUSCular PRN    dextrose 10% infusion 100 mL  100 mL IntraVENous PRN    levoFLOXacin (LEVAQUIN) 750 mg in D5W IVPB  750 mg IntraVENous Q24H    acetaminophen (TYLENOL) tablet 650 mg  650 mg Oral Q8H    doxazosin (CARDURA) tablet 1 mg  1 mg Oral DAILY    pantoprazole (PROTONIX) 40 mg in sodium chloride 0.9% 10 mL injection  40 mg IntraVENous DAILY    albuterol (PROVENTIL VENTOLIN) nebulizer solution 2.5 mg  2.5 mg Nebulization Q4H PRN       Allergies and Intolerances: Allergies   Allergen Reactions    Aspirin Other (comments)     \"messes my stomach up\"       Family History:   Family History   Problem Relation Age of Onset    Heart Disease Mother        Social History:   He  reports that he has quit smoking. His smoking use included cigarettes. He has never used smokeless tobacco.  He  reports that he does not drink alcohol. Review of Systems:     Gen: No fever, chills, malaise, weight loss/gain. Heent: No headache, rhinorrhea, epistaxis, ear pain, hearing loss, sinus pain, neck pain/stiffness, sore throat. Heart: Positive chest pain, shortness of breath on exertion, No palpitations, pnd, or orthopnea. Resp: No cough, hemoptysis, wheezing, Positive dyspnea. GI: No nausea, vomiting, diarrhea, constipation, melena or hematochezia. : No urinary obstruction, dysuria or hematuria. Derm: No rash, new skin lesion or pruritis. Musc/skeletal: Positive bone or joint complains. Vasc: No edema, cyanosis or claudication. Endo: No heat/cold intolerance, no polyuria,polydipsia or polyphagia.    Neuro: No unilateral weakness, numbness, tingling. No seizures. Heme: No easy bruising or bleeding. Physical:   Patient Vitals for the past 6 hrs:   Temp Pulse Resp BP SpO2   07/10/19 1928 98 °F (36.7 °C) 83 18 117/70 95 %   07/10/19 1840 -- 92 -- -- --   07/10/19 1828 -- (!) 112 -- -- --   07/10/19 1752 98 °F (36.7 °C) 80 18 141/72 97 %   07/10/19 1720 98.8 °F (37.1 °C) 93 19 146/63 96 %   07/10/19 1649 98.3 °F (36.8 °C) (!) 135 20 (!) 167/94 97 %   07/10/19 1546 98.6 °F (37 °C) 84 18 166/72 96 %   07/10/19 1530 -- -- -- -- 97 %         Exam:   General Appearance: Comfortable, not using accessory muscles of respiration. HEENT: TAMI. HEAD: Atraumatic  NECK: No JVD, no thyroidomeglay. CAROTIDS: No bruit  LUNGS: Clear bilaterally. HEART: S1+S2 audible, no murmur, no pericardial rub. ABD: Non-tender, BS Audible    EXT: No edema, and no cyanosis. VASCULAR EXAM: Pulses are intact. PSYCHIATRIC EXAM: Mood is appropriate. MUSCULOSKELETAL: Grossly no joint deformity.   NEUROLOGICAL: AAO times 3, Motor and sensory sytem intact   Review of Data:   LABS:   Lab Results   Component Value Date/Time    WBC 5.2 07/10/2019 04:30 AM    HGB 11.0 (L) 07/10/2019 04:30 AM    HCT 33.5 (L) 07/10/2019 04:30 AM    PLATELET 094 47/21/2163 04:30 AM     Lab Results   Component Value Date/Time    Sodium 137 07/10/2019 04:30 AM    Potassium 3.7 07/10/2019 04:30 AM    Chloride 103 07/10/2019 04:30 AM    CO2 27 07/10/2019 04:30 AM    Glucose 132 (H) 07/10/2019 04:30 AM    BUN 29 (H) 07/10/2019 04:30 AM    Creatinine 1.13 07/10/2019 04:30 AM     No results found for: CHOL, CHOLX, CHLST, CHOLV, HDL, LDL, LDLC, DLDLP, TGLX, TRIGL, TRIGP  No results found for: GPT  Lab Results   Component Value Date/Time    Hemoglobin A1c 5.8 (H) 07/07/2019 02:46 AM         Cardiology Procedures:   Results for orders placed or performed during the hospital encounter of 07/06/19   EKG, 12 LEAD, INITIAL   Result Value Ref Range    Ventricular Rate 130 BPM    Atrial Rate 156 BPM QRS Duration 84 ms    Q-T Interval 298 ms    QTC Calculation (Bezet) 438 ms    Calculated R Axis 66 degrees    Calculated T Axis 46 degrees    Diagnosis       Atrial fibrillation with rapid ventricular response with premature   ventricular or aberrantly conducted complexes  Nonspecific ST abnormality  Abnormal ECG               Impression / Plan:    Patient Active Problem List   Diagnosis Code    COPD (chronic obstructive pulmonary disease) ([facility] J44.9    Hypertension I10    Tobacco abuse Z72.0    COPD (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 ([facility]) J44.9    Chronic renal disease, stage 3, moderately decreased glomerular filtration rate between 30-59 mL/min/1.73 square meter ([facility]) N18.3    COPD exacerbation ([facility]) J44.1    Asbestosis ([address] J61    Acute exacerbation of chronic obstructive pulmonary disease (COPD) ([facility]) J44.1    Pneumonia J18.9    COPD with acute exacerbation ([facility]) J44.1    Acute respiratory failure with hypoxia ([facility]) J96.01    Advanced care planning/counseling discussion Z71.89    Debility R53.81     Telementary reviewed : frequent PVCs, frequent PACs with the run off paroxysmal atrial fibrillation  Currently in sinus rhythm  Paroxysmal atrial fibrillation CHADsVASc score is 3.0  Nonsustained ventricular tachycardia    23-year-old gentleman admitted with COPD exacerbation. He complained of chest pain and found to have atrial fibrillation with rapid ventricular response. Currently he is in sinus rhythm. Monitor also revealed few episodes of nonsustained ventricular tachycardia. Start metoprolol tartrate 25 mg by mouth twice a day. Start Lovenox 1 mg per KG subcutaneous every 12 hours. Avoid beta 2 agonist nebulizers  Keep potassium around 4 and magnesium level around 2  Echocardiogram  Continue management as per hospital medicine  Continue management as per Pulmonologist.    Total time spent 45 minutes.    Signed By: Shelbie Powell MD     July 10, 2019

## 2019-07-11 NOTE — PROGRESS NOTES
Bedside and Verbal shift change report given to JASWANT Perez (oncoming nurse) by Constance (offgoing nurse). Report included the following information SBAR, Kardex, Intake/Output, MAR and Recent Results.

## 2019-07-11 NOTE — PROGRESS NOTES
Problem: Falls - Risk of  Goal: *Absence of Falls  Description  Document Violeta Guzman Fall Risk and appropriate interventions in the flowsheet.   Outcome: Progressing Towards Goal     Problem: Patient Education: Go to Patient Education Activity  Goal: Patient/Family Education  Outcome: Progressing Towards Goal

## 2019-07-11 NOTE — ROUTINE PROCESS
Bedside and Verbal shift change report given to Jerod Zepeda RN (oncoming nurse) by VINCE Arreguin RN (offgoing nurse). Report included the following information SBAR, Kardex, Intake/Output, MAR and Recent Results.

## 2019-07-11 NOTE — PROGRESS NOTES
Patient was visited by Brooklynn Bergman. Volunteer had a prayer with patient and reported no needs to this . Chaplains will continue to follow and will provide pastoral care as needed or requested. [address], M.Div.   Board Certified   [phone] - Office

## 2019-07-11 NOTE — PROGRESS NOTES
Problem: Mobility Impaired (Adult and Pediatric)  Goal: *Acute Goals and Plan of Care (Insert Text)  Description  Physical Therapy Goals   Initiated 7/10/2019 and to be accomplished within 3-5 day(s)  1. Patient will move from supine <> sit with S in prep for out of bed activity and change of position. 2.  Patient will perform sit<> stand with S/LRAD in prep for transfers/ambulation. 3.  Patient will transfer from bed <> chair with S/LRAD for time up in chair for completion of ADL activity. 4.  Patient will ambulate 100 feet with S/LRAD for improved functional mobility/safe discharge. 5.  Patient will ascend/descend 6 stairs with handrail(s) with CGA for home re-entry as needed. Outcome: Progressing Towards Goal   PHYSICAL THERAPY TREATMENT    Patient: Christal Morillo (28 y.o. male)  Date: 7/11/2019  Diagnosis: Pneumonia [J18.9]  COPD with acute exacerbation ([address] [J44.1]  Pneumonia [J18.9]  COPD with acute exacerbation ([address] [J44.1] Pneumonia    Precautions: Fall(SPO2)  PLOF:     ASSESSMENT:  No assistance needed to sit up EOB. Elevated bed to assist with sit to stand. Pt ambulated with RW 40ft, kyphotic posture, decreased step height and length (B)LEs, no LOB or path deviations. Increased SOB, SpO2 92% on RA. Pt returned to supine in bed, 2 visitors present. Progression toward goals:   ?      Improving appropriately and progressing toward goals  ? Improving slowly and progressing toward goals  ? Not making progress toward goals and plan of care will be adjusted     PLAN:  Patient continues to benefit from skilled intervention to address the above impairments. Continue treatment per established plan of care. Discharge Recommendations:  Skilled Nursing Facility  Further Equipment Recommendations for Discharge:  rolling walker     SUBJECTIVE:   Patient stated ? I am going to rehab tomorrow. ? [facility])    OBJECTIVE DATA SUMMARY:   Critical Behavior:  Neurologic State: Alert, Appropriate for age  Orientation Level: Oriented X4  Cognition: Appropriate decision making, Appropriate for age attention/concentration, Appropriate safety awareness, Follows commands  Safety/Judgement: Awareness of environment, Fall prevention, Insight into deficits  Functional Mobility Training:  Bed Mobility:    Supine to Sit: Contact guard assistance  Sit to Supine: Stand-by assistance  Transfers:  Sit to Stand: Contact guard assistance  Stand to Sit: Contact guard assistance  Balance:  Sitting: Intact  Standing: Intact; With support  Standing - Static: Fair  Standing - Dynamic : Fair   Ambulation/Gait Training:  Distance (ft): 40 Feet (ft)  Assistive Device: Gait belt;Walker, rolling  Ambulation - Level of Assistance: Contact guard assistance    Gait Abnormalities: Decreased step clearance  Base of Support: Center of gravity altered    Speed/[place]isbet: Slow  Step Length: Right shortened;Left shortened  Pain:  Pain level pre-treatment: 0/10  Pain level post-treatment: 0/10   Pain Intervention(s): Medication (see MAR); Rest, Ice, Repositioning   Response to intervention: Nurse notified, See doc flow    Activity Tolerance:   Fair  Please refer to the flowsheet for vital signs taken during this treatment. After treatment:   ? Patient left in no apparent distress sitting up in chair  ? Patient left in no apparent distress in bed  ? Call bell left within reach  ? Nursing notified  ? Caregiver present  ? Bed alarm activated  ? SCDs applied      COMMUNICATION/EDUCATION:   ?         Role of Physical Therapy in the acute care setting. ?         Fall prevention education was provided and the patient/caregiver indicated understanding. ? Patient/family have participated as able in working toward goals and plan of care. ?         Patient/family agree to work toward stated goals and plan of care. ?         Patient understands intent and goals of therapy, but is neutral about his/her participation. ? Patient is unable to participate in stated goals/plan of care: ongoing with therapy staff.   ?         Other:        Owen Lomeli PTA   Time Calculation: 18 mins

## 2019-07-11 NOTE — PROGRESS NOTES
[facility]  Pulmonary, Critical Care, and Sleep Medicine    Pulmonary Note    Name: Farhan Nino MRN: [id number]   : 1945 Hospital: [facility] [place]   Date: 2019  [facility]     Subjective:   Patient is a 74 y.o. 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 Patient was admitted yesterday with COPD exacerbation to tele floor. He seemed to have worsened overnight, failed BIPAP and was subsequently intubated. 19   Patient on room air, in bed  Yesterday noon episode of Afib RVR with chest pain requiring to move to current room  Improved cough and SOB  Denies chest pain, wheezing or hemoptysis  Denies palpitations, syncope, orthopnea, edema or pnd   Afebrile. Tolerating PO diet    Followed with Dr. Olga Lopez in our office before  Reports quit smoking few months ago      Review of Systems   General ROS: negative for  - fever, chills, weight loss, fatigue and malaise  Cardiovascular ROS: negative for - chest pain, edema, murmur, orthopnea, palpitations or pnd  Gastrointestinal ROS: no nausea, vomiting, abdominal pain, change in bowel habits, or black or bloody stools  Dermatological ROS: negative for - pruritus, rash or skin lesion changes       Past Medical History:   Diagnosis Date    Cancer ([address]     prostate    COPD (chronic obstructive pulmonary disease) ([address]     Hypertension     Pneumonia     Radiation effect       Past Surgical History:   Procedure Laterality Date    HX HERNIA REPAIR        Prior to Admission medications    Medication Sig Start Date End Date Taking? Authorizing Provider   albuterol (PROVENTIL VENTOLIN) 2.5 mg /3 mL (0.083 %) nebulizer solution 3 mL by Nebulization route every two (2) hours as needed for Wheezing.  19   Sam Patient, DO   albuterol-ipratropium (DUO-NEB) 2.5 mg-0.5 mg/3 ml nebu 3 mL by Nebulization route every four (4) hours as needed. 2/9/19   Edwar Le, DO   chlorpheniramine-HYDROcodone (TUSSIONEX) 10-8 mg/5 mL suspension Take 5 mL by mouth every twelve (12) hours as needed for Cough. Max Daily Amount: 10 mL. 2/9/19   Edwar Le, DO   fluticasone furoate (ARNUITY ELLIPTA) 100 mcg/actuation dsdv inhaler Take 1 Puff by inhalation daily. 10/24/18   Rimma Zuñiga PA-C   acetaminophen (TYLENOL ARTHRITIS PAIN) 650 mg TbER Take 1 Tab by mouth every eight (8) hours. 9/8/18   Keiko Corona PA-C   albuterol (PROVENTIL HFA, VENTOLIN HFA, PROAIR HFA) 90 mcg/actuation inhaler Take 2 Puffs by inhalation every four (4) hours as needed for Wheezing or Shortness of Breath. 4/23/18   Rimma Zuñiga PA-C   ipratropium (ATROVENT) 0.03 % nasal spray 2 Sprays every twelve (12) hours. Blayne Bran MD   tamsulosin (FLOMAX) 0.4 mg capsule Take 0.4 mg by mouth daily. Blayne Bran MD   albuterol (PROVENTIL VENTOLIN) 2.5 mg /3 mL (0.083 %) nebulizer solution 3 mL by Nebulization route every four (4) hours as needed for Wheezing. 12/23/15   TRINA Moore   chlorthalidone (HYGROTEN) 25 mg tablet Take  by mouth daily.     Blayne Bran MD   Nebulizer & Compressor machine Dispense: 1 nebulizer machine w all tubing necessary 10/6/14   Edwar Le, DO     Allergies   Allergen Reactions    Aspirin Other (comments)     \"messes my stomach up\"      Social History     Tobacco Use    Smoking status: Former Smoker     Types: Cigarettes    Smokeless tobacco: Never Used   Substance Use Topics    Alcohol use: No      Family History   Problem Relation Age of Onset    Heart Disease Mother         Current Facility-Administered Medications   Medication Dose Route Frequency    perflutren lipid microspheres (DEFINITY) in NS bolus IV  1 mL IntraVENous RAD ONCE    methylPREDNISolone (PF) (Solu-MEDROL) injection 40 mg  40 mg IntraVENous Q12H    enoxaparin (LOVENOX) injection 90 mg  1 mg/kg SubCUTAneous Q12H    metoprolol tartrate (LOPRESSOR) tablet 25 mg  25 mg Oral Q12H    piperacillin-tazobactam (ZOSYN) 3.375 g in 0.9% sodium chloride (MBP/ADV) 100 mL MBP  3.375 g IntraVENous Q6H    budesonide (PULMICORT) 500 mcg/2 ml nebulizer suspension  500 mcg Nebulization BID RT    levoFLOXacin (LEVAQUIN) 750 mg in D5W IVPB  750 mg IntraVENous Q24H    acetaminophen (TYLENOL) tablet 650 mg  650 mg Oral Q8H    doxazosin (CARDURA) tablet 1 mg  1 mg Oral DAILY    pantoprazole (PROTONIX) 40 mg in sodium chloride 0.9% 10 mL injection  40 mg IntraVENous DAILY       Latest lactic acid:   Lactic acid   Date Value Ref Range Status   2019 1.4 0.4 - 2.0 MMOL/L Final   2018 0.9 0.4 - 2.0 MMOL/L Final   2018 1.6 0.4 - 2.0 MMOL/L Final       Objective:   Vital Signs:    Visit Vitals  /57   Pulse 84   Temp 98 °F (36.7 °C)   Resp 16   Ht 5' 8\" (1.727 m)   Wt 88.9 kg (196 lb)   SpO2 97%   BMI 29.80 kg/m²       O2 Device: Room air      Temp (24hrs), Av.1 °F (36.7 °C), Min:97.7 °F (36.5 °C), Max:98.8 °F (37.1 °C)       Intake/Output:   Last shift:      701 - 1900  In: 720 [P.O.:720]  Out: 100 [Urine:100]  Last 3 shifts: 1901 -  0700  In: 6344 [P.O.:240;  I.V.:850]  Out: 3750 [Urine:3750]    Intake/Output Summary (Last 24 hours) at 2019 1437  Last data filed at 2019 1234  Gross per 24 hour   Intake 970 ml   Output 1575 ml   Net -605 ml         Physical Exam:   Gene: no respiratory distress; comfortable, awake, on room air  HEENT: pupils not dilated, reactive, no scleral jaundice, moist oral mucosa, no nasal drainage  Neck: No adenopathy or thyroid swelling  CVS: S1S2 no murmurs; JVD not elevated  RS: Mod to poor air entry bilaterally, symmetrical BS; no bilateral expiratory wheezes, mild bi-basal crackles  Abd: soft, non tender, no hepatosplenomegaly, no abd distension, no guarding or rigidity, bowel sounds heard  Neuro: AAO x 3, follows all commands, non-focal exam  Extrm: no leg edema or swelling or clubbing  Skin: no rash  Lymphatic: no cervical or supraclavicular adenopathy    Telemetry: NSR    Data review:     Recent Results (from the past 24 hour(s))   GLUCOSE, POC    Collection Time: 07/10/19  3:49 PM   Result Value Ref Range    Glucose (POC) 139 (H) 70 - 110 mg/dL   EKG, 12 LEAD, INITIAL    Collection Time: 07/10/19  5:08 PM   Result Value Ref Range    Ventricular Rate 130 BPM    Atrial Rate 156 BPM    QRS Duration 84 ms    Q-T Interval 298 ms    QTC Calculation (Bezet) 438 ms    Calculated R Axis 66 degrees    Calculated T Axis 46 degrees    Diagnosis       Atrial fibrillation with rapid ventricular response with premature   ventricular or aberrantly conducted complexes  Nonspecific ST abnormality  Abnormal ECG  Confirmed by Jeff Hurtado MD, [facility] ([id number]) on 7/10/2019 8:41:14 PM     CARDIAC PANEL,(CK, CKMB & TROPONIN)    Collection Time: 07/10/19  5:15 PM   Result Value Ref Range    CK 44 39 - 308 U/L    CK - MB 2.2 <3.6 ng/ml    CK-MB Index 5.0 (H) 0.0 - 4.0 %    Troponin-I, QT <0.02 0.0 - 0.045 NG/ML   MAGNESIUM    Collection Time: 07/10/19  5:15 PM   Result Value Ref Range    Magnesium 2.1 1.6 - 2.6 mg/dL   GLUCOSE, POC    Collection Time: 07/10/19  9:04 PM   Result Value Ref Range    Glucose (POC) 138 (H) 70 - 823 mg/dL   METABOLIC PANEL, BASIC    Collection Time: 07/11/19  5:34 AM   Result Value Ref Range    Sodium 140 136 - 145 mmol/L    Potassium 4.0 3.5 - 5.5 mmol/L    Chloride 106 100 - 108 mmol/L    CO2 27 21 - 32 mmol/L    Anion gap 7 3.0 - 18 mmol/L    Glucose 134 (H) 74 - 99 mg/dL    BUN 34 (H) 7.0 - 18 MG/DL    Creatinine 1.23 0.6 - 1.3 MG/DL    BUN/Creatinine ratio 28 (H) 12 - 20      GFR est AA >60 >60 ml/min/1.73m2    GFR est non-AA 57 (L) >60 ml/min/1.73m2    Calcium 7.7 (L) 8.5 - 10.1 MG/DL   CBC WITH AUTOMATED DIFF    Collection Time: 07/11/19  5:34 AM   Result Value Ref Range    WBC 6.9 4.6 - 13.2 K/uL    RBC 4.15 (L) 4.70 - 5.50 M/uL    HGB 11.6 (L) 13.0 - 16.0 g/dL    HCT 35.1 (L) 36.0 - 48.0 %    MCV 84.6 74.0 - 97.0 FL    MCH 28.0 24.0 - 34.0 PG    MCHC 33.0 31.0 - 37.0 g/dL    RDW 14.6 (H) 11.6 - 14.5 %    PLATELET 667 842 - 268 K/uL    MPV 8.4 (L) 9.2 - 11.8 FL    NEUTROPHILS 86 (H) 40 - 73 %    LYMPHOCYTES 7 (L) 21 - 52 %    MONOCYTES 7 3 - 10 %    EOSINOPHILS 0 0 - 5 %    BASOPHILS 0 0 - 2 %    ABS. NEUTROPHILS 5.9 1.8 - 8.0 K/UL    ABS. LYMPHOCYTES 0.5 (L) 0.9 - 3.6 K/UL    ABS. MONOCYTES 0.5 0.05 - 1.2 K/UL    ABS. EOSINOPHILS 0.0 0.0 - 0.4 K/UL    ABS.  BASOPHILS 0.0 0.0 - 0.1 K/UL    DF AUTOMATED     GLUCOSE, POC    Collection Time: 07/11/19  6:25 AM   Result Value Ref Range    Glucose (POC) 133 (H) 70 - 110 mg/dL   ECHO ADULT COMPLETE    Collection Time: 07/11/19 10:03 AM   Result Value Ref Range    AO ASC D 3.55 cm    Aortic Valve Systolic Peak Velocity 060.08 cm/s    AoV VTI 33.57 cm    Aortic Valve Area by Continuity of Peak Velocity 2.8 cm2    Aortic Valve Area by Continuity of VTI 3.1 cm2    AoV PG 12.5 mmHg    LVIDd 5.11 4.2 - 5.9 cm    LVPWd 1.00 0.6 - 1.0 cm    LVIDs 3.45 cm    IVSd 1.06 (A) 0.6 - 1.0 cm    LV ED Vol A2C 54.0 mL    LV ES Vol A4C 25.8 mL    LV ES Vol BP 25.6 22 - 58 mL    LVOT d 2.16 cm    LVOT Peak Velocity 135.81 cm/s    LVOT Peak Gradient 7.4 mmHg    LVOT VTI 28.58 cm    LV E' Septal Velocity 7.00 cm/s    LV E' Lateral Velocity 11.00 cm/s    MVA (PHT) 3.4 cm2    MV A Raul 116.12 cm/s    MV E Raul 104.05 cm/s    MV E/A 0.90     RVIDd 2.91 cm    Aortic Valve Systolic Mean Gradient 6.6 mmHg    BP EF 60.8 55 - 100 %    LV Ejection Fraction MOD 4C 59 %    LV Ejection Fraction MOD 2C 53 %    Inferior Vena Cava Diameter Sniffing 1.52 cm    LA Vol 4C 35.09 18 - 58 mL    LA Vol 2C 47.74 18 - 58 mL    LV Mass .9 (A) 88 - 224 g    LV Mass AL Index 113.5 49 - 115 g/m2    E/E' lateral 9.46     E/E' septal 14.86     TAPSV 2.3 cm/s    E/E' ratio (averaged) 12.16     LV ES Vol A2C 25.2 mL    LVES Vol Index BP 12.6 mL/m2    LV ED Vol A4C 63.6 mL    LVED Vol Index BP 32.3 mL/m2    Mitral Valve E Wave Deceleration Time 224.2 ms    Mitral Valve Pressure Half-time 65.0 ms    Left Atrium Major Axis 3.61 cm    Pulmonic Valve Max Velocity 118.66 cm/s    LV ED Vol BP 65.4 (A) 67 - 155 ml    LA Vol Index 23.56 16 - 28 ml/m2    LA Vol Index 17.32 16 - 28 ml/m2    LVED Vol Index A4C 31.4 mL/m2    LVED Vol Index A2C 26.6 mL/m2    LVES Vol Index A4C 12.7 mL/m2    LVES Vol Index A2C 12.4 mL/m2    GRANT/BSA Pk Raul 1.4 cm2/m2    GRANT/BSA VTI 1.5 cm2/m2    PV peak gradient 5.6 mmHg    LA Volume 45.38 18 - 58 mL    Right Atrial Area 4C 13.65 cm2    Ao Root D 3.48 cm    LA Vol Index 22.39 16 - 28 ml/m2   GLUCOSE, POC    Collection Time: 07/11/19 11:24 AM   Result Value Ref Range    Glucose (POC) 166 (H) 70 - 110 mg/dL           No results for input(s): FIO2I, IFO2, HCO3I, IHCO3, HCOPOC, PCO2I, PCOPOC, IPHI, PHI, PHPOC, PO2I, PO2POC in the last 72 hours. No lab exists for component: IPOC2  EKG  Diagnosis   Final   Sinus tachycardia   Otherwise normal ECG   When compared with ECG of 08-FEB-2019 12:15,   aberrant conduction is no longer present   T wave inversion no longer evident in Anterior leads   Confirmed by Myrna Grace MD, -- ([id number]) on 7/6/2019 11:48:08 AM      Echo March 2017  SUMMARY:  Left ventricle: Systolic function was normal by visual assessment. Ejection fraction was estimated in the range of 55 % to 60 %. Poor resolution of endocardial border. Doppler parameters were consistent with abnormal left ventricular relaxation (grade 1 diastolic dysfunction). COMPARISONS:  Comparison was made with the previous study of 03-Oct-2014.       All Micro Results     Procedure Component Value Units Date/Time    CULTURE, BLOOD [[id number]] Collected:  07/06/19 0742    Order Status:  Completed Specimen:  Whole Blood Updated:  07/11/19 0616     Special Requests: NO SPECIAL REQUESTS        Culture result: NO GROWTH 5 DAYS       CULTURE, BLOOD [[id number]] Collected:  07/07/19 0400    Order Status:  Completed Specimen:  Blood Updated:  07/11/19 0616     Special Requests: NO SPECIAL REQUESTS        Culture result: NO GROWTH 4 DAYS       CULTURE, BLOOD [[id number]] Collected:  07/06/19 0700    Order Status:  Completed Specimen:  Whole Blood Updated:  07/11/19 0616     Special Requests: NO SPECIAL REQUESTS        Culture result: NO GROWTH 5 DAYS       CULTURE, RESPIRATORY/SPUTUM/BRONCH Virgle Taylor STAIN [[id number]] Collected:  07/07/19 1050    Order Status:  Completed Specimen:  Sputum from Tracheal Aspirate Updated:  07/09/19 0852     Special Requests: NO SPECIAL REQUESTS        GRAM STAIN >25 WBC/lpf         <10 EPI/lpf         MUCUS PRESENT         NO ORGANISMS SEEN        Culture result:       RARE NORMAL RESPIRATORY ELLA          CULTURE, BLOOD [[id number]] Collected:  07/07/19 0400    Order Status:  Canceled             Imaging:  [x]I have personally reviewed the patients chest radiographs images and report   CXR port 7/8/19  1. Endotracheal tube, left IJ central venous catheter, and visualized nasogastric tube as above. 2. Left retrocardiac atelectasis or airspace disease similar to preceding CT exam.  3. Unchanged right-sided pleural-based calcifications and peripherally calcified chronic right pleural effusion      Results from Hospital Encounter encounter on 07/06/19   XR SWALLOW FUNC VIDEO    Narrative Modified Barium Swallow    History: Dysphagia, feeding difficulties    Technique: The procedure was performed with the speech pathologist. Different  consistencies of barium tinged products were given to swallow during real time  fluoroscopic observation. Findings:     With thin consistency , pt demonstrated [normal swallowing]. No significant  residua in the vallecula and pyriform sinus. With putting consistency , pt demonstrated [normal swallowing]. No significant  residua in the vallecula and pyriform sinus. With []solids, pt demonstrated [swallow delay, otherwise normal swallowing].   No  significant residua in the vallecula and pyriform sinus. Fluoroscopic time: 0.8 minutes  Fluoroscopic dose (reference air kerma): 2.47 mGy       Impression Impression:  Abnormal modified swallow study as described. Please see full speech  pathologist report to follow for discussion of findings and detailed  recommendations. Videotape is available for review. Results from Hospital Encounter encounter on 07/06/19   CTA CHEST W OR W WO CONT    Narrative EXAM: CTA chest    INDICATION: Pneumonia with COPD exacerbation. Intubated    COMPARISON: CT chest 10/20/2018. AP portable chest done earlier today. TECHNIQUE: Axial CT imaging from the thoracic inlet through the diaphragm with  intravenous contrast. Coronal and sagittal MIP reformats were generated. One or more dose reduction techniques were used on this CT: automated exposure  control, adjustment of the mAs and/or kVp according to patient size, and  iterative reconstruction techniques. The specific techniques used on this CT  exam have been documented in the patient's electronic medical record. Digital  Imaging and Communications in Medicine (DICOM) format image data are available  to nonaffiliated external healthcare facilities or entities on a secure, media  free, reciprocally searchable basis with patient authorization for at least a  12-month period after this study. _______________    FINDINGS:    EXAM QUALITY: Overall exam quality is diagnostic. PULMONARY ARTERIES: No evidence of pulmonary embolism. MEDIASTINUM: Heart size is normal. No pericardial effusion. Aorta is normal in  caliber. NG tube is present. Tip extends down to the GE junction but not  definitely into the stomach. LUNGS: There is dense consolidation of the left lower lobe. There is a new  finding compared to prior CT and is not apparent on AP portable chest film done  earlier today.  Chronic atelectasis versus scarring posterior right lower lobe is  stable as is the subpleural anterior peripherally calcified mass in the right  upper lobe. PLEURA: Chronic right pleural effusion with peripheral calcification is stable  compared to prior CT.    AIRWAY: Endotracheal tube is present in good position terminating above the  juliana. LYMPH NODES: No enlarged nodes. UPPER ABDOMEN: Partially imaged left renal cyst. Upper abdominal structures are  otherwise unremarkable. OTHER: No acute or aggressive osseous abnormalities identified. _______________      Impression IMPRESSION:    1. No evidence of pulmonary embolism. 2.  New dense consolidation left lower lobe which could represent pneumonia or  atelectasis. 3. Chronic peripherally calcified right pleural effusion with adjacent  atelectasis or scarring. 4. NG tube traverses Dobbhoff feeding tube is present with the tip extending to  the GE junction but not definitely into the stomach. It should be advanced 8 to  10 cm for better positioning. Endotracheal tube good position. IMPRESSION:   Principal Problem:    Acute respiratory failure with hypoxia     Pneumonia    COPD with acute exacerbation J44.1  Patient Active Problem List   Diagnosis Code    COPD (chronic obstructive pulmonary disease) ([facility]) J44.9    Afib     Hypertension I10    Tobacco abuse Z72.0    Chronic renal disease, stage 3, moderately decreased glomerular filtration rate between 30-59 mL/min/1.73 square meter ([facility]) N18.3    COPD exacerbation ([address] J44.1    Asbestosis ([address] J61    Pneumonia J18.9    Acute respiratory failure with hypoxia ([address] J96.01    Advanced care planning/counseling discussion Z71.89    Debility R53.81      RECOMMENDATIONS:   · Pulm: on room air; monitor for goal SPO2 > 91%  · Chest XR in Am for follow up  · CTA chest negative for PE  · Budesonide neb;  Atrovent nebs; IV steroids- taper to 20 mg Q12  · Avoid LABA and BRAVO for now; may be able to try once acute issues resolved  · Aspiration prevention measures, HOB 30', pulmonary hygiene care  · Cardiac: Trops neg; hemodynamically stable  · Afib management per cardiologist  · ID: resp cxs normal tarik final; continue levaquin, zosyn and stopped iv vanc 7/10; may go home or rehab on Levofloxacin x 7-10 days total based on CXR, clinical course  · Renal: monitor UOP and renal fn  · GI: Diet as tolerated  · Proph:  DVt and GI proph - sc heparin and PPI   · Updated patient and significant others  · Patient agrees to follow up again with DR. MC LOPEZ after DC home  Will defer respective systems problem management to primary and other consultant and follow patient with primary and other medical team  Further recommendations will be based on the patient's response to recommended treatment and results of the investigation ordered.   ADVANCE DIRECTIVE: Full Code  Discussed with RN, RT and staff     High complexity decision making was performed in this consultation and evaluation of this patient        Perico Araujo MD

## 2019-07-12 ENCOUNTER — APPOINTMENT (OUTPATIENT)
Dept: GENERAL RADIOLOGY | Age: 74
DRG: 208 | End: 2019-07-12
Attending: INTERNAL MEDICINE
Payer: MEDICARE

## 2019-07-12 VITALS
HEIGHT: 68 IN | RESPIRATION RATE: 16 BRPM | SYSTOLIC BLOOD PRESSURE: 173 MMHG | BODY MASS INDEX: 30.2 KG/M2 | OXYGEN SATURATION: 97 % | HEART RATE: 69 BPM | TEMPERATURE: 97.6 F | WEIGHT: 199.3 LBS | DIASTOLIC BLOOD PRESSURE: 71 MMHG

## 2019-07-12 LAB
ANION GAP SERPL CALC-SCNC: 6 MMOL/L (ref 3–18)
BACTERIA SPEC CULT: NORMAL
BACTERIA SPEC CULT: NORMAL
BASOPHILS # BLD: 0 K/UL (ref 0–0.1)
BASOPHILS NFR BLD: 0 % (ref 0–2)
BUN SERPL-MCNC: 31 MG/DL (ref 7–18)
BUN/CREAT SERPL: 27 (ref 12–20)
CALCIUM SERPL-MCNC: 7.5 MG/DL (ref 8.5–10.1)
CHLORIDE SERPL-SCNC: 104 MMOL/L (ref 100–108)
CO2 SERPL-SCNC: 29 MMOL/L (ref 21–32)
CREAT SERPL-MCNC: 1.15 MG/DL (ref 0.6–1.3)
DIFFERENTIAL METHOD BLD: ABNORMAL
EOSINOPHIL # BLD: 0 K/UL (ref 0–0.4)
EOSINOPHIL NFR BLD: 0 % (ref 0–5)
ERYTHROCYTE [DISTWIDTH] IN BLOOD BY AUTOMATED COUNT: 14.2 % (ref 11.6–14.5)
GLUCOSE BLD STRIP.AUTO-MCNC: 120 MG/DL (ref 70–110)
GLUCOSE SERPL-MCNC: 134 MG/DL (ref 74–99)
HCT VFR BLD AUTO: 34.4 % (ref 36–48)
HGB BLD-MCNC: 11.3 G/DL (ref 13–16)
LYMPHOCYTES # BLD: 0.4 K/UL (ref 0.9–3.6)
LYMPHOCYTES NFR BLD: 5 % (ref 21–52)
MCH RBC QN AUTO: 27.9 PG (ref 24–34)
MCHC RBC AUTO-ENTMCNC: 32.8 G/DL (ref 31–37)
MCV RBC AUTO: 84.9 FL (ref 74–97)
MONOCYTES # BLD: 0.3 K/UL (ref 0.05–1.2)
MONOCYTES NFR BLD: 4 % (ref 3–10)
NEUTS SEG # BLD: 6.8 K/UL (ref 1.8–8)
NEUTS SEG NFR BLD: 91 % (ref 40–73)
PLATELET # BLD AUTO: 226 K/UL (ref 135–420)
PMV BLD AUTO: 8.9 FL (ref 9.2–11.8)
POTASSIUM SERPL-SCNC: 4 MMOL/L (ref 3.5–5.5)
RBC # BLD AUTO: 4.05 M/UL (ref 4.7–5.5)
SERVICE CMNT-IMP: NORMAL
SERVICE CMNT-IMP: NORMAL
SODIUM SERPL-SCNC: 139 MMOL/L (ref 136–145)
WBC # BLD AUTO: 7.5 K/UL (ref 4.6–13.2)

## 2019-07-12 PROCEDURE — 74011250636 HC RX REV CODE- 250/636: Performed by: FAMILY MEDICINE

## 2019-07-12 PROCEDURE — 74011250637 HC RX REV CODE- 250/637: Performed by: FAMILY MEDICINE

## 2019-07-12 PROCEDURE — 74011000258 HC RX REV CODE- 258: Performed by: FAMILY MEDICINE

## 2019-07-12 PROCEDURE — 74011250637 HC RX REV CODE- 250/637: Performed by: INTERNAL MEDICINE

## 2019-07-12 PROCEDURE — 74011250636 HC RX REV CODE- 250/636: Performed by: INTERNAL MEDICINE

## 2019-07-12 PROCEDURE — 94640 AIRWAY INHALATION TREATMENT: CPT

## 2019-07-12 PROCEDURE — 94760 N-INVAS EAR/PLS OXIMETRY 1: CPT

## 2019-07-12 PROCEDURE — 36415 COLL VENOUS BLD VENIPUNCTURE: CPT

## 2019-07-12 PROCEDURE — 71045 X-RAY EXAM CHEST 1 VIEW: CPT

## 2019-07-12 PROCEDURE — 74011000250 HC RX REV CODE- 250: Performed by: HOSPITALIST

## 2019-07-12 PROCEDURE — C9113 INJ PANTOPRAZOLE SODIUM, VIA: HCPCS | Performed by: FAMILY MEDICINE

## 2019-07-12 PROCEDURE — 85025 COMPLETE CBC W/AUTO DIFF WBC: CPT

## 2019-07-12 PROCEDURE — 74011000250 HC RX REV CODE- 250: Performed by: FAMILY MEDICINE

## 2019-07-12 PROCEDURE — 80048 BASIC METABOLIC PNL TOTAL CA: CPT

## 2019-07-12 PROCEDURE — 82962 GLUCOSE BLOOD TEST: CPT

## 2019-07-12 RX ORDER — METOPROLOL TARTRATE 25 MG/1
25 TABLET, FILM COATED ORAL EVERY 12 HOURS
Qty: 60 TAB | Refills: 0 | Status: SHIPPED | OUTPATIENT
Start: 2019-07-12 | End: 2019-09-06

## 2019-07-12 RX ORDER — LEVOFLOXACIN 500 MG/1
500 TABLET, FILM COATED ORAL DAILY
Qty: 5 TAB | Refills: 0 | Status: SHIPPED | OUTPATIENT
Start: 2019-07-12 | End: 2019-08-23

## 2019-07-12 RX ORDER — PREDNISONE 20 MG/1
TABLET ORAL
Qty: 10 TAB | Refills: 0 | Status: SHIPPED | OUTPATIENT
Start: 2019-07-12 | End: 2019-08-23

## 2019-07-12 RX ADMIN — LEVALBUTEROL 1.25 MG: 1.25 SOLUTION, CONCENTRATE RESPIRATORY (INHALATION) at 07:35

## 2019-07-12 RX ADMIN — SODIUM CHLORIDE 40 MG: 9 INJECTION INTRAMUSCULAR; INTRAVENOUS; SUBCUTANEOUS at 08:01

## 2019-07-12 RX ADMIN — ACETAMINOPHEN 650 MG: 325 TABLET ORAL at 08:02

## 2019-07-12 RX ADMIN — LEVOFLOXACIN 750 MG: 5 INJECTION, SOLUTION INTRAVENOUS at 06:26

## 2019-07-12 RX ADMIN — PIPERACILLIN SODIUM,TAZOBACTAM SODIUM 3.38 G: 3; .375 INJECTION, POWDER, FOR SOLUTION INTRAVENOUS at 00:18

## 2019-07-12 RX ADMIN — METHYLPREDNISOLONE SODIUM SUCCINATE 20 MG: 125 INJECTION, POWDER, FOR SOLUTION INTRAMUSCULAR; INTRAVENOUS at 08:01

## 2019-07-12 RX ADMIN — BUDESONIDE 500 MCG: 0.5 INHALANT RESPIRATORY (INHALATION) at 07:32

## 2019-07-12 RX ADMIN — PIPERACILLIN SODIUM,TAZOBACTAM SODIUM 3.38 G: 3; .375 INJECTION, POWDER, FOR SOLUTION INTRAVENOUS at 06:26

## 2019-07-12 RX ADMIN — ENOXAPARIN SODIUM 90 MG: 100 INJECTION SUBCUTANEOUS at 08:02

## 2019-07-12 RX ADMIN — DOXAZOSIN 1 MG: 1 TABLET ORAL at 08:02

## 2019-07-12 RX ADMIN — METOPROLOL TARTRATE 25 MG: 25 TABLET ORAL at 08:01

## 2019-07-12 RX ADMIN — ACETAMINOPHEN 650 MG: 325 TABLET ORAL at 00:20

## 2019-07-12 NOTE — DISCHARGE SUMMARY
Discharge Summary    Patient: Eduardo Dallas MRN: [id number]  CSN: [id number]    YOB: 1945  Age: 74 y.o.   Sex: male    DOA: 7/6/2019 LOS:  LOS: 6 days   Discharge Date:      Primary Care Provider:  Juan Vazquez MD    Admission Diagnoses: Pneumonia [J18.9]  COPD with acute exacerbation ([address] [J44.1]  Pneumonia [J18.9]  COPD with acute exacerbation ([address] [J44.1]    Discharge Diagnoses:    Problem List as of 7/12/2019 Date Reviewed: 7/6/2019          Codes Class Noted - Resolved    Advanced care planning/counseling discussion ICD-10-CM: Z71.89  ICD-9-CM: V65.49  7/8/2019 - Present        Debility ICD-10-CM: R53.81  ICD-9-CM: 799.3  7/8/2019 - Present        Acute respiratory failure with hypoxia ([facility] ICD-10-CM: J96.01  ICD-9-CM: 518.81  7/7/2019 - Present        * (Principal) Pneumonia ICD-10-CM: J18.9  ICD-9-CM: 604  7/6/2019 - Present        COPD with acute exacerbation ([facility] ICD-10-CM: J44.1  ICD-9-CM: 491.21  7/6/2019 - Present        Acute exacerbation of chronic obstructive pulmonary disease (COPD) ([facility] ICD-10-CM: J44.1  ICD-9-CM: 491.21  2/8/2019 - Present        COPD exacerbation ([facility] ICD-10-CM: J44.1  ICD-9-CM: 491.21  10/19/2018 - Present        Asbestosis ([facility] ICD-10-CM: V52  ICD-9-CM: 315  10/19/2018 - Present        Chronic renal disease, stage 3, moderately decreased glomerular filtration rate between 30-59 mL/min/1.73 square meter ([facility] ICD-10-CM: N18.3  ICD-9-CM: 585.3  7/20/2018 - Present        COPD (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 ([facility] ICD-10-CM: J44.9  ICD-9-CM: 491.20  4/20/2018 - Present        Tobacco abuse (Chronic) ICD-10-CM: Z72.0  ICD-9-CM: 305.1  3/22/2017 - Present        COPD (chronic obstructive pulmonary disease) ([facility] ICD-10-CM: J44.9  ICD-9-CM: 496  10/2/2014 - Present        Hypertension ICD-10-CM: I10  ICD-9-CM: 401.9  Unknown - Present        RESOLVED: COPD with asthma and status asthmaticus ([facility] ICD-10-CM: J44.9, J45.902  ICD-9-CM: 493.21  7/20/2018 - 2/8/2019        RESOLVED: Status asthmaticus with COPD (chronic obstructive pulmonary disease) ([facility] ICD-10-CM: J44.9, J45.902  ICD-9-CM: 493.21  4/20/2018 - 2/8/2019        RESOLVED: PVC's (premature ventricular contractions) ICD-10-CM: I49.3  ICD-9-CM: 427.69  4/20/2018 - 2/8/2019        RESOLVED: Acute respiratory failure ([facility] ICD-10-CM: J96.00  ICD-9-CM: 518.81  10/2/2014 - 3/18/2017        RESOLVED: URIEL (acute kidney injury) ([place] [address] ICD-10-CM: N17.9  ICD-9-CM: 584.9  10/2/2014 - 2/8/2019        RESOLVED: Pneumonia ICD-10-CM: J18.9  ICD-9-CM: 486  10/2/2014 - 3/18/2017              Discharge Medications:     Current Discharge Medication List      START taking these medications    Details   umeclidinium-vilanterol (ANORO ELLIPTA) 62.5-25 mcg/actuation inhaler Take 1 Puff by inhalation daily. Qty: 1 Inhaler, Refills: 0      predniSONE (DELTASONE) 20 mg tablet 40mg po daily x 5d. Qty: 10 Tab, Refills: 0      levoFLOXacin (LEVAQUIN) 500 mg tablet Take 1 Tab by mouth daily. Qty: 5 Tab, Refills: 0         CONTINUE these medications which have NOT CHANGED    Details   albuterol-ipratropium (DUO-NEB) 2.5 mg-0.5 mg/3 ml nebu 3 mL by Nebulization route every four (4) hours as needed. Qty: 30 Nebule, Refills: 0      albuterol (PROVENTIL HFA, VENTOLIN HFA, PROAIR HFA) 90 mcg/actuation inhaler Take 2 Puffs by inhalation every four (4) hours as needed for Wheezing or Shortness of Breath. Qty: 1 Inhaler, Refills: 1      ipratropium (ATROVENT) 0.03 % nasal spray 2 Sprays every twelve (12) hours. tamsulosin (FLOMAX) 0.4 mg capsule Take 0.4 mg by mouth daily. chlorthalidone (HYGROTEN) 25 mg tablet Take  by mouth daily.          STOP taking these medications       albuterol (PROVENTIL VENTOLIN) 2.5 mg /3 mL (0.083 %) nebulizer solution Comments:   Reason for Stopping:         chlorpheniramine-HYDROcodone (TUSSIONEX) 10-8 mg/5 mL suspension Comments:   Reason for Stopping: fluticasone furoate (ARNUITY ELLIPTA) 100 mcg/actuation dsdv inhaler Comments:   Reason for Stopping:         acetaminophen (TYLENOL ARTHRITIS PAIN) 650 mg TbER Comments:   Reason for Stopping:         albuterol (PROVENTIL VENTOLIN) 2.5 mg /3 mL (0.083 %) nebulizer solution Comments:   Reason for Stopping:         Nebulizer & Compressor machine Comments:   Reason for Stopping:               Discharge Condition: Good    Procedures : None    Consults: Cardiology, Pulmonology      PHYSICAL EXAM  Visit Vitals  /71   Pulse 69   Temp 97.6 °F (36.4 °C)   Resp 16   Ht 5' 8\" (1.727 m)   Wt 90.4 kg (199 lb 4.7 oz)   SpO2 97%   BMI 30.30 kg/m²     General: Awake, cooperative, no acute distress    HEENT: NC, Atraumatic. PERRLA, EOMI. Anicteric sclerae. Lungs:  CTA Bilaterally. No Wheezing/Rhonchi/Rales. Heart:  Regular  rhythm,  No murmur, No Rubs, No Gallops  Abdomen: Soft, Non distended, Non tender. +Bowel sounds,   Extremities: No c/c/e  Psych:   Not anxious or agitated. Neurologic:  No acute neurological deficits. Admission HPI :       This patient has been seen and evaluated at the request of Dr.K Brigitte Pettit for patient on vent support. Patient is a 74 y.o. 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 Patient was admitted yesterday with COPD exacerbation to tele floor. He seemed to have worsened overnight, failed BIPAP and was subsequently intubated. Patient in icu, intubated and sedated now. BP stable. No cough or hemoptysis. Stable vent settings - now on VCV mode; did not tolerate PCV mode per RT; fio2 at 40%.          Hospital Course :     Admitted to icu. Started on bipap though failed. Intubated. Sedated. Pulmonary CC consulted. Started on steroids and antibiotics. Afib an active issue. Rate controlled with metoprolol. NOAC considered given elevated risk for CVA. Weaned off mechanical ventilation. Currently on telemetry floor doing well. Plans made for dc to [facility]. Activity: Activity as tolerated    Diet: Cardiac Diet    Follow-up: F/u with receiving MD.    Disposition: SNF    Minutes spent on discharge: 45      Labs: Results:       Chemistry Recent Labs     07/12/19  0330 07/11/19  0534 07/10/19  0430   * 134* 132*    140 137   K 4.0 4.0 3.7    106 103   CO2 29 27 27   BUN 31* 34* 29*   CREA 1.15 1.23 1.13   CA 7.5* 7.7* 8.1*   AGAP 6 7 7   BUCR 27* 28* 26*      CBC w/Diff Recent Labs     07/12/19  0330 07/11/19  0534 07/10/19  0430   WBC 7.5 6.9 5.2   RBC 4.05* 4.15* 3.98*   HGB 11.3* 11.6* 11.0*   HCT 34.4* 35.1* 33.5*    222 247   GRANS 91* 86* 92*   LYMPH 5* 7* 4*   EOS 0 0 0      Cardiac Enzymes Recent Labs     07/10/19  1715   CPK 44   CKND1 5.0*      Coagulation No results for input(s): PTP, INR, APTT in the last 72 hours. No lab exists for component: INREXT    Lipid Panel No results found for: CHOL, CHOLPOCT, CHOLX, CHLST, CHOLV, 227330, HDL, LDL, LDLC, DLDLP, 082679, VLDLC, VLDL, TGLX, TRIGL, TRIGP, TGLPOCT, CHHD, CHHDX   BNP No results for input(s): BNPP in the last 72 hours. Liver Enzymes No results for input(s): TP, ALB, TBIL, AP, SGOT, GPT in the last 72 hours. No lab exists for component: DBIL   Thyroid Studies No results found for: T4, T3U, TSH, TSHEXT         Significant Diagnostic Studies: Xr Abd (kub)    Result Date: 7/7/2019  EXAM: KUB INDICATION: OG tube placement COMPARISON: CTA chest done earlier today _______________ FINDINGS: Portable supine abdominal film was performed. NG tube extends well into the stomach. It was in the distal esophagus on CTA chest done earlier today. Nonobstructive bowel gas pattern. _______________     IMPRESSION: 1. Nasogastric tube good position. Xr Swallow Fun Video    Result Date: 7/11/2019  Modified Barium Swallow History: Dysphagia, feeding difficulties Technique:  The procedure was performed with the speech pathologist. Different consistencies of barium tinged products were given to swallow during real time fluoroscopic observation. Findings: With thin consistency , pt demonstrated [normal swallowing]. No significant residua in the vallecula and pyriform sinus. With putting consistency , pt demonstrated [normal swallowing]. No significant residua in the vallecula and pyriform sinus. With []solids, pt demonstrated [swallow delay, otherwise normal swallowing]. No significant residua in the vallecula and pyriform sinus. Fluoroscopic time: 0.8 minutes Fluoroscopic dose (reference air kerma): 2.47 mGy     Impression: Abnormal modified swallow study as described. Please see full speech pathologist report to follow for discussion of findings and detailed recommendations. Videotape is available for review. Cta Chest W Or W Wo Cont    Result Date: 7/7/2019  EXAM: CTA chest INDICATION: Pneumonia with COPD exacerbation. Intubated COMPARISON: CT chest 10/20/2018. AP portable chest done earlier today. TECHNIQUE: Axial CT imaging from the thoracic inlet through the diaphragm with intravenous contrast. Coronal and sagittal MIP reformats were generated. One or more dose reduction techniques were used on this CT: automated exposure control, adjustment of the mAs and/or kVp according to patient size, and iterative reconstruction techniques. The specific techniques used on this CT exam have been documented in the patient's electronic medical record. Digital Imaging and Communications in Medicine (DICOM) format image data are available to nonaffiliated external healthcare facilities or entities on a secure, media free, reciprocally searchable basis with patient authorization for at least a 12-month period after this study. _______________ FINDINGS: EXAM QUALITY: Overall exam quality is diagnostic. PULMONARY ARTERIES: No evidence of pulmonary embolism.  MEDIASTINUM: Heart size is normal. No pericardial effusion. Aorta is normal in caliber. NG tube is present. Tip extends down to the GE junction but not definitely into the stomach. LUNGS: There is dense consolidation of the left lower lobe. There is a new finding compared to prior CT and is not apparent on AP portable chest film done earlier today. Chronic atelectasis versus scarring posterior right lower lobe is stable as is the subpleural anterior peripherally calcified mass in the right upper lobe. PLEURA: Chronic right pleural effusion with peripheral calcification is stable compared to prior CT. AIRWAY: Endotracheal tube is present in good position terminating above the juliana. LYMPH NODES: No enlarged nodes. UPPER ABDOMEN: Partially imaged left renal cyst. Upper abdominal structures are otherwise unremarkable. OTHER: No acute or aggressive osseous abnormalities identified. _______________     IMPRESSION: 1. No evidence of pulmonary embolism. 2.  New dense consolidation left lower lobe which could represent pneumonia or atelectasis. 3. Chronic peripherally calcified right pleural effusion with adjacent atelectasis or scarring. 4. NG tube traverses Dobbhoff feeding tube is present with the tip extending to the GE junction but not definitely into the stomach. It should be advanced 8 to 10 cm for better positioning. Endotracheal tube good position. Xr Chest Port    Result Date: 7/12/2019  EXAM: Portable chest radiograph 7/12/2019 7:10 AM CLINICAL INDICATION/HISTORY: COPD. Atrial fibrillation. Pneumonia. TECHNIQUE: A semierect portable radiograph was obtained. COMPARISON: 7/8/2019. FINDINGS: The cardiomediastinal contours are unchanged. Endotracheal and nasogastric tubes have been removed in the interim. The elliptical, partially calcified pleural abnormality is again identified projecting over the right mid lung and is unchanged. Aeration of the right lower lobe has improved. Scarring in the right upper lobe is unchanged.   There are no new areas of parenchymal consolidation or collapse. There is no pneumothorax or significant pleural effusion. IMPRESSION:  1. Improving aeration of the right lower lobe. Otherwise, no significant interval change. Xr Chest Port    Result Date: 7/8/2019  EXAM: XR CHEST PORT CLINICAL INDICATION/HISTORY: Respiratory failure -Additional: None COMPARISON: Several prior exams, most recently CT scan of the chest 7/7/2019 TECHNIQUE: Portable frontal view of the chest _______________ FINDINGS: SUPPORT DEVICES: Endotracheal tube projects approximately 6.3 cm above the juliana. Left IJ central venous catheter with tip projecting over the superior cavoatrial junction. Nasogastric tube below the diaphragm, looped within the stomach HEART AND MEDIASTINUM: Stable cardiac size and mediastinal contours. LUNGS AND PLEURAL SPACES: Redemonstration of chronic peripherally calcified right-sided pleural effusion and anterior right hemithoracic pleural plaque. No pneumothorax. The right basilar opacity and left retrocardiac opacity present. BONY THORAX AND SOFT TISSUES: Unremarkable. _______________     IMPRESSION: 1. Endotracheal tube, left IJ central venous catheter, and visualized nasogastric tube as above. 2. Left retrocardiac atelectasis or airspace disease similar to preceding CT exam. 3. Unchanged right-sided pleural-based calcifications and peripherally calcified chronic right pleural effusion    Xr Chest Port    Result Date: 7/7/2019  --------------------------------------------------------------------------- <<<<<<<<<           [facility]           >>>>>>>>> --------------------------------------------------------------------------- CLINICAL HISTORY:  Endotracheal tube placement. Respiratory failure. COMPARISON EXAMINATIONS:  Previous of the same day. ---  SINGLE FRONTAL VIEW OF THE CHEST  --- The cardiomediastinal silhouette is stable. There is a right lower lobe infiltrate.  There is blunting of the right costophrenic angle. The left lung is clear. There is an endotracheal tube seen at the level of the inferior clavicles. A gastric tube extends just below the diaphragm with side port above the gastroesophageal junction. No significant osseous abnormalities are identified.  --------------    Impression: -------------- Endotracheal tube in good position. Gastric tube extends just below the diaphragm with side port above the gastroesophageal junction. Recommend advancing approximately 10 cm for better positioning. Right lower lobe infiltrate similar to previous    Xr Chest Port    Result Date: 7/7/2019  --------------------------------------------------------------------------- <<<<<<<<<           [facility]           >>>>>>>>> --------------------------------------------------------------------------- CLINICAL HISTORY:  Shortness of breath. COMPARISON EXAMINATIONS:  September 6, 2017. ---  SINGLE FRONTAL VIEW OF THE CHEST  --- The cardiomediastinal silhouette is stable. There is a right lower lobe infiltrate similar to previous. There is no new infiltrate. The left lung is clear. No significant osseous abnormalities are identified.  --------------    Impression: -------------- Stable right lower lobe infiltrate. No significant interval change. Xr Chest Port    Result Date: 7/6/2019  EXAM: CHEST RADIOGRAPH, SINGLE VIEW CLINICAL INDICATION/HISTORY: SOB COMPARISON: Single view chest 2/8/2019 TECHNIQUE: Portable frontal view of the chest was obtained. _______________ FINDINGS: SUPPORT DEVICES: None. HEART AND MEDIASTINUM: Heart and pulmonary vascularity  are normal. LUNGS AND PLEURAL SPACES: There are increased interstitial markings bilaterally with more focal opacity in the right lower lobe and a calcified density projecting over the right midlung field. This is all stable. No acute infiltrate. No effusion. No pneumothorax. BONY THORAX AND SOFT TISSUES: No acute osseous abnormality. _______________     IMPRESSION: 1. Stable chest with chronic findings. No acute cardiopulmonary disease. No results found for this or any previous visit.         CC: Yesenia Daigle MD

## 2019-07-12 NOTE — PROGRESS NOTES
Bedside and Verbal shift change report given to C. Shary Moritz, RN (oncoming nurse) by VINCE Arreguin RN (offgoing nurse). Report included the following information SBAR, Kardex, Intake/Output, MAR and Recent Results.

## 2019-07-12 NOTE — PROGRESS NOTES
Transition of Care Plan:     Communication to Patient/Family:  Met with patient and family, and they are agreeable to the transition plan. Met with wife, granddaughter, patient and Dr. Rafal Cabral all agree upon d/c plan to [facility] today  SNF/Rehab Transition:  Patient has been accepted to [facility] at [address] [facility], [address] [place] [address] for SNF and meets criteria for admission. Patient will transported by [facility] and expected to leave at  12:30. Sylvia Manley LPN at [facility]  Is aware of trasnport time and is able to accomodate today    Communication to SNF/Rehab:  Bedside RN,  has been notified to update the transition plan to the facility and call report [phone]. Discharge information has been updated on the AVS and communicated through [facility] or [facility] Link. Discharge instructions to be fax'd to facility at [phone]     Please include all hard scripts for controlled substances, med rec and dc summary, and AVS in packet. Please medicate for pain prior to dc if possible and needed to help offset delay when patient first arrives to facility. Reviewed and confirmed with facility, [facility] can manage the patient care needs for the following:     Gerbriane Shown with (X) only those applicable:  Medication:  [x]Medications are available at the facility  []IV Antibiotics    []Controlled Substance - hard copies available sent. []Weekly Labs    Equipment:  []CPAP/BiPAP  []Wound Vacuum  []Humphreys or Urinary Device  []PICC/Central Line  []Nebulizer  []Ventilator    Treatment:  []Isolation (for MRSA, VRE, etc.)  []Surgical Drain Management  []Tracheostomy Care  []Dressing Changes  []Dialysis with transportation  []PEG Care  [x]Oxygen  []Daily Weights for Heart Failure    Dietary:  []Any diet limitations  []Tube Feedings   []Total Parenteral Management (TPN) On dental soft diet   Financial Resources:  []Medicaid Application Completed    [x]UAI Completed and copy given to pt/family. 07/12/2019 patient given copy of denial for LTTS screening    []A screening has previously been completed. []Level II Completed    [] Private pay individual who will not become   financially eligible for Medicaid within 6 months from admission to a [facility] facility. [] Individual refused to have screening conducted. []Medicaid Application Completed    []The screening denied because it was determined individual did not need/did not qualify for nursing facility level of care. [] Out of state residents seeking direct admission to a [address] facility. [] Individuals who are inpatients of an out of state hospital, or in state or out of state veterans/ hospital and seek direct admission to a [address] facility  [] Individuals who are pateints or residents of a state owned/operated facility that is licensed by Department of [employer] and seek direct admission to [address] [place] [address]  [] A screening not required for enrollment in [address] [place] [address] [address] services as set out in [address] [place] [address] 52-  [] [facility]) staff shall perform screenings of the [facility] clients. Copy sent to . Copy place in chart. Cms sent copy to [facility]   Advanced Care Plan:  []Surrogate Decision Maker of Care  []POA  []Communicated Code Status and copy sent. Other:         Care Management Interventions  PCP Verified by CM:  Yes  Palliative Care Criteria Met (RRAT>21 & CHF Dx)?: Yes  Mode of Transport at Discharge: BLS  Transition of Care Consult (CM Consult): SNF  Partner SNF: Yes  Health Maintenance Reviewed: Yes  Physical Therapy Consult: Yes  Occupational Therapy Consult: Yes  Speech Therapy Consult: Yes  Current Support Network: Lives with Spouse, Family Lives Nearby  Confirm Follow Up Transport: Family  Plan discussed with Pt/Family/Caregiver: Yes  Freedom of Choice Offered: Yes  Discharge Location  Discharge Placement: Skilled nursing facility

## 2019-07-12 NOTE — PROGRESS NOTES
INITIAL NUTRITION ASSESSMENT     RECOMMENDATIONS/PLAN:   Other: Continue w/ POC  Monitor labs/lytes, PO intakes, skin integrity, wt, fluid status, BM    REASON FOR ASSESSMENT:     [x] LOS    NUTRITION ASSESSMENT:   Client History: 74 yrs old Male admitted with COPD exacerbation requiring to be intubated-extubated now, SLP following, RLL PNA, a-fib w/ RVR     PMHx: prostate ca, COPD, HTN, PNA, radiation effect    Cultural/Adventist Food Preferences: None Identified    FOOD/NUTRITION HISTORY  Diet History: eating 100% per PO intake averages    Food Allergies:  [x] NKFA     Pertinent PTA Medications:  [x] Reviewed     NUTRITION INTAKE   Diet Order:  Soft      Average PO Intake:       Patient Vitals for the past 100 hrs:   % Diet Eaten   07/11/19 1808 100 %   07/11/19 1234 100 %   07/11/19 1006 100 %   07/10/19 1230 100 %   07/10/19 0815 100 %   07/10/19 0744 100 %   07/09/19 2016 100 %   07/09/19 1018 100 %      Pertinent Medications:  [x] Reviewed; methylprednisolone protonis   Electrolyte Replacement Protocol: []K  []Mg  []PO4    Insulin:  [] SSI  [] Pre-meal   []  Basal   [] Drip  [] None  Pt expected to meet estimated nutrient needs through next review:          [x]  Yes     [] No;  ANTHROPOMETRICS  Height: 5' 8\" (172.7 cm)       Weight: 90.4 kg (199 lb 4.7 oz)    BMI: 30.3 kg/m^2  -  obese (30%-39.9% BMI)        Weight change: wt loss noted not significant                                   Comparison to Reference Standards:  IBW: 154 lbs      %IBW: 129%      AdjBW: 75.1 kg    NUTRITION-FOCUSED PHYSICAL ASSESSMENT  Skin: Intact . GI: +BM 7/11/19    BIOCHEMICAL DATA & MEDICAL TESTS  Pertinent Labs:  [x] Reviewed;      NUTRITION PRESCRIPTION  Calories: 2047 kcal/day based on [place] x 1.3  Protein: 72-90 g/day based on 0.8-1.0 g/kg  Fluid: 2047 ml/day based on 1 kcal/ml      NUTRITION DIAGNOSES:   1. No nutritional problems at this time     NUTRITION INTERVENTIONS:   INTERVENTIONS:        GOALS:  1.  Other: Continue w/ POC 1. Encourage PO intake >75% at most meals by next review 7 days     LEARNING NEEDS (Diet, Supplementation, Food/Nutrient-Drug Interaction):   [x] None Identified  [] Inpatient education provided/documented    [] Identified and patient:  [] Declined     [] Was not appropriate/indicated  NUTRITION MONITORING /EVALUATION:   Follow PO intake  Monitor wt  Monitor renal labs, electrolytes, fluid status    [] Participated in Interdisciplinary Rounds  [x] [address] [place] [address] Reviewed/Documented  DISCHARGE NUTRITION RECOMMENDATIONS ADDRESSED:        [x] To be determined closer to discharge    NUTRITION RISK:     []  At risk                     [x]  Not currently at risk     Will follow-up per policy.   Prabha Crouch 1

## 2019-07-12 NOTE — ACP (ADVANCE CARE PLANNING)
Patient completed [place]. Primary MPOA is wife, Titus Sims [phone]. Secondary MPOA is daughter, Sonia Klinefelter [phone]. Current goal of care is Full Code with full interventions. His medical wishes include no life prolonging procedures and treatments at the end of life. In the event of severe head injury or large CVA, which could render him very functionally impaired and unaware of his surroundings he would not wish for life prolonging treatments or procedures.

## 2019-07-13 LAB
BACTERIA SPEC CULT: NORMAL
SERVICE CMNT-IMP: NORMAL

## 2019-07-16 ENCOUNTER — PATIENT OUTREACH (OUTPATIENT)
Dept: CASE MANAGEMENT | Age: 74
End: 2019-07-16

## 2019-07-16 NOTE — PROGRESS NOTES
Community Care Team Documentation for Patient in [facility]     Patient discharged from [address] to [address] [place] [address], on 7/12/19. Hospital Discharge diagnosis:       Pneumonia   Acute Resp Failure with Hypoxia   Advanced care planning/counseling discussion   Debility   COPD with exacerbation   Asbestosis   Chronic Renal Disease, Stage 3   Tobacco abuse   Hypertension    SNF Attending Provider:       Anticipated discharge date from SNF:  TBD    PCP : Namrata Draper MD    Nurse Navigator:     [facility] Team rounds completed, updates provided by facility. Brief Summary of Care:    FC, working with PT/OT, able to ambulate in room alone. On O2 presently - was not on PTA. Dispo:  Home with wife, has 6 steps to go up, working on steps with therapy. Will eval for home O2 if needed prior to discharge. RRAT:  Medium Risk            15       Total Score        2 . Living with Significant Other. Assisted Living. LTAC. SNF. or   Rehab    4 IP Visits Last 12 Months (1-3=4, 4=9, >4=11)    9 Charlson Comorbidity Score (Age + Comorbid Conditions)        Criteria that do not apply:    Has Seen PCP in Last 6 Months (Yes=3, No=0)    Patient Length of Stay (>5 days = 3)    Pt.  Coverage (Medicare=5 , Medicaid, or Self-Pay=4)        Active Ambulatory Problems     Diagnosis Date Noted    COPD (chronic obstructive pulmonary disease) ([address] 10/02/2014    Hypertension     Tobacco abuse 03/22/2017    COPD (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 ([address] 04/20/2018    Chronic renal disease, stage 3, moderately decreased glomerular filtration rate between 30-59 mL/min/1.73 square meter ([address] 07/20/2018    COPD exacerbation ([address] 10/19/2018    Asbestosis ([address] 10/19/2018    Acute exacerbation of chronic obstructive pulmonary disease (COPD) ([address] 02/08/2019    Pneumonia 07/06/2019    COPD with acute exacerbation ([address] 07/06/2019    Acute respiratory failure with hypoxia ([address] 07/07/2019    Advanced care planning/counseling discussion 07/08/2019    Debility 07/08/2019     Resolved Ambulatory Problems     Diagnosis Date Noted    Acute respiratory failure ([address] 10/02/2014    URIEL (acute kidney injury) ([facility] [address] 10/02/2014    Pneumonia 10/02/2014    Status asthmaticus with COPD (chronic obstructive pulmonary disease) ([address] 04/20/2018    PVC's (premature ventricular contractions) 04/20/2018    COPD with asthma and status asthmaticus ([address] 07/20/2018     Past Medical History:   Diagnosis Date    Cancer ([address]     COPD (chronic obstructive pulmonary disease) ([facility] [address]     Hypertension     Pneumonia     Radiation effect              Supriya Alexander, [address]

## 2019-07-24 ENCOUNTER — PATIENT OUTREACH (OUTPATIENT)
Dept: CASE MANAGEMENT | Age: 74
End: 2019-07-24

## 2019-07-24 NOTE — PROGRESS NOTES
Community Care Team Documentation for Patient in [facility]     Patient discharged from [address] to [address] [place] [address], on 7/12/19. Hospital Discharge diagnosis:       Pneumonia   Acute Resp Failure with Hypoxia   Advanced care planning/counseling discussion   Debility   COPD with exacerbation   Asbestosis   Chronic Renal Disease, Stage 3   Tobacco abuse   Hypertension    SNF Attending Provider:       Anticipated discharge date from SNF:  TBD    PCP : Ange Peres MD    Nurse Navigator:     [facility] Team rounds completed, updates provided by facility. Brief Summary of Care:    FC, working with PT/OT, able to ambulate in room alone. On O2 presently - was not on PTA. Dispo:  Home with wife, has 6 steps to go up, working on steps with therapy. Will eval for home O2 if needed prior to discharge. Doing well  . Will Discharge home on 7/27/19. [facility] . RRAT:  Medium Risk            15       Total Score        2 . Living with Significant Other. Assisted Living. LTAC. SNF. or   Rehab    4 IP Visits Last 12 Months (1-3=4, 4=9, >4=11)    9 Charlson Comorbidity Score (Age + Comorbid Conditions)        Criteria that do not apply:    Has Seen PCP in Last 6 Months (Yes=3, No=0)    Patient Length of Stay (>5 days = 3)    Pt.  Coverage (Medicare=5 , Medicaid, or Self-Pay=4)        Active Ambulatory Problems     Diagnosis Date Noted    COPD (chronic obstructive pulmonary disease) ([facility]) 10/02/2014    Hypertension     Tobacco abuse 03/22/2017    COPD (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 ([facility]) 04/20/2018    Chronic renal disease, stage 3, moderately decreased glomerular filtration rate between 30-59 mL/min/1.73 square meter ([facility]) 07/20/2018    COPD exacerbation ([facility]) 10/19/2018    Asbestosis ([facility]) 10/19/2018    Acute exacerbation of chronic obstructive pulmonary disease (COPD) ([facility] 02/08/2019    Pneumonia 07/06/2019    COPD with acute exacerbation ([facility] 07/06/2019    Acute respiratory failure with hypoxia ([facility] 07/07/2019    Advanced care planning/counseling discussion 07/08/2019    Debility 07/08/2019     Resolved Ambulatory Problems     Diagnosis Date Noted    Acute respiratory failure ([facility] 10/02/2014    URIEL (acute kidney injury) ([facility] 10/02/2014    Pneumonia 10/02/2014    Status asthmaticus with COPD (chronic obstructive pulmonary disease) ([facility] 04/20/2018    PVC's (premature ventricular contractions) 04/20/2018    COPD with asthma and status asthmaticus ([facility] 07/20/2018     Past Medical History:   Diagnosis Date    Cancer [facility])     Radiation effect              Jhony Hansen, [address]

## 2019-07-25 ENCOUNTER — HOME HEALTH ADMISSION (OUTPATIENT)
Dept: HOME HEALTH SERVICES | Facility: HOME HEALTH | Age: 74
End: 2019-07-25
Payer: MEDICARE

## 2019-07-28 ENCOUNTER — HOME CARE VISIT (OUTPATIENT)
Dept: SCHEDULING | Facility: HOME HEALTH | Age: 74
End: 2019-07-28
Payer: MEDICARE

## 2019-07-28 VITALS
WEIGHT: 195 LBS | OXYGEN SATURATION: 97 % | TEMPERATURE: 97 F | DIASTOLIC BLOOD PRESSURE: 70 MMHG | BODY MASS INDEX: 29.55 KG/M2 | SYSTOLIC BLOOD PRESSURE: 120 MMHG | RESPIRATION RATE: 16 BRPM | HEART RATE: 66 BPM | HEIGHT: 68 IN

## 2019-07-28 PROCEDURE — G0299 HHS/HOSPICE OF RN EA 15 MIN: HCPCS

## 2019-07-28 PROCEDURE — 400013 HH SOC

## 2019-07-28 PROCEDURE — 3331090002 HH PPS REVENUE DEBIT

## 2019-07-28 PROCEDURE — 3331090001 HH PPS REVENUE CREDIT

## 2019-07-29 ENCOUNTER — HOME CARE VISIT (OUTPATIENT)
Dept: HOME HEALTH SERVICES | Facility: HOME HEALTH | Age: 74
End: 2019-07-29
Payer: MEDICARE

## 2019-07-29 ENCOUNTER — HOME CARE VISIT (OUTPATIENT)
Dept: SCHEDULING | Facility: HOME HEALTH | Age: 74
End: 2019-07-29
Payer: MEDICARE

## 2019-07-29 VITALS
RESPIRATION RATE: 17 BRPM | OXYGEN SATURATION: 98 % | SYSTOLIC BLOOD PRESSURE: 133 MMHG | DIASTOLIC BLOOD PRESSURE: 71 MMHG | HEART RATE: 83 BPM | TEMPERATURE: 97.2 F

## 2019-07-29 PROCEDURE — 3331090001 HH PPS REVENUE CREDIT

## 2019-07-29 PROCEDURE — 3331090002 HH PPS REVENUE DEBIT

## 2019-07-29 PROCEDURE — G0151 HHCP-SERV OF PT,EA 15 MIN: HCPCS

## 2019-07-30 PROCEDURE — 3331090002 HH PPS REVENUE DEBIT

## 2019-07-30 PROCEDURE — 3331090001 HH PPS REVENUE CREDIT

## 2019-07-31 ENCOUNTER — HOME CARE VISIT (OUTPATIENT)
Dept: SCHEDULING | Facility: HOME HEALTH | Age: 74
End: 2019-07-31
Payer: MEDICARE

## 2019-07-31 VITALS
TEMPERATURE: 97.5 F | HEART RATE: 72 BPM | SYSTOLIC BLOOD PRESSURE: 128 MMHG | OXYGEN SATURATION: 98 % | DIASTOLIC BLOOD PRESSURE: 72 MMHG

## 2019-07-31 PROCEDURE — G0157 HHC PT ASSISTANT EA 15: HCPCS

## 2019-07-31 PROCEDURE — 3331090002 HH PPS REVENUE DEBIT

## 2019-07-31 PROCEDURE — 3331090001 HH PPS REVENUE CREDIT

## 2019-08-01 ENCOUNTER — HOME CARE VISIT (OUTPATIENT)
Dept: SCHEDULING | Facility: HOME HEALTH | Age: 74
End: 2019-08-01
Payer: MEDICARE

## 2019-08-01 VITALS
DIASTOLIC BLOOD PRESSURE: 75 MMHG | SYSTOLIC BLOOD PRESSURE: 143 MMHG | TEMPERATURE: 96.5 F | RESPIRATION RATE: 18 BRPM | HEART RATE: 63 BPM | OXYGEN SATURATION: 98 %

## 2019-08-01 PROCEDURE — 3331090001 HH PPS REVENUE CREDIT

## 2019-08-01 PROCEDURE — 3331090002 HH PPS REVENUE DEBIT

## 2019-08-01 PROCEDURE — G0495 RN CARE TRAIN/EDU IN HH: HCPCS

## 2019-08-02 PROCEDURE — 3331090002 HH PPS REVENUE DEBIT

## 2019-08-02 PROCEDURE — 3331090001 HH PPS REVENUE CREDIT

## 2019-08-03 PROCEDURE — 3331090002 HH PPS REVENUE DEBIT

## 2019-08-03 PROCEDURE — 3331090001 HH PPS REVENUE CREDIT

## 2019-08-04 PROCEDURE — 3331090002 HH PPS REVENUE DEBIT

## 2019-08-04 PROCEDURE — 3331090001 HH PPS REVENUE CREDIT

## 2019-08-05 ENCOUNTER — HOME CARE VISIT (OUTPATIENT)
Dept: HOME HEALTH SERVICES | Facility: HOME HEALTH | Age: 74
End: 2019-08-05
Payer: MEDICARE

## 2019-08-05 PROCEDURE — 3331090002 HH PPS REVENUE DEBIT

## 2019-08-05 PROCEDURE — 3331090001 HH PPS REVENUE CREDIT

## 2019-08-06 ENCOUNTER — HOME CARE VISIT (OUTPATIENT)
Dept: SCHEDULING | Facility: HOME HEALTH | Age: 74
End: 2019-08-06
Payer: MEDICARE

## 2019-08-06 VITALS
DIASTOLIC BLOOD PRESSURE: 75 MMHG | RESPIRATION RATE: 18 BRPM | OXYGEN SATURATION: 96 % | SYSTOLIC BLOOD PRESSURE: 144 MMHG | HEART RATE: 77 BPM | TEMPERATURE: 97.6 F

## 2019-08-06 PROCEDURE — 3331090001 HH PPS REVENUE CREDIT

## 2019-08-06 PROCEDURE — G0152 HHCP-SERV OF OT,EA 15 MIN: HCPCS

## 2019-08-06 PROCEDURE — G0495 RN CARE TRAIN/EDU IN HH: HCPCS

## 2019-08-06 PROCEDURE — 3331090002 HH PPS REVENUE DEBIT

## 2019-08-06 PROCEDURE — G0157 HHC PT ASSISTANT EA 15: HCPCS

## 2019-08-07 VITALS — HEART RATE: 78 BPM | OXYGEN SATURATION: 97 % | SYSTOLIC BLOOD PRESSURE: 130 MMHG | DIASTOLIC BLOOD PRESSURE: 72 MMHG

## 2019-08-07 PROCEDURE — 3331090002 HH PPS REVENUE DEBIT

## 2019-08-07 PROCEDURE — 3331090001 HH PPS REVENUE CREDIT

## 2019-08-08 ENCOUNTER — HOME CARE VISIT (OUTPATIENT)
Dept: SCHEDULING | Facility: HOME HEALTH | Age: 74
End: 2019-08-08
Payer: MEDICARE

## 2019-08-08 ENCOUNTER — HOME CARE VISIT (OUTPATIENT)
Dept: HOME HEALTH SERVICES | Facility: HOME HEALTH | Age: 74
End: 2019-08-08
Payer: MEDICARE

## 2019-08-08 VITALS
DIASTOLIC BLOOD PRESSURE: 48 MMHG | OXYGEN SATURATION: 97 % | TEMPERATURE: 97.3 F | HEART RATE: 73 BPM | SYSTOLIC BLOOD PRESSURE: 119 MMHG | RESPIRATION RATE: 16 BRPM

## 2019-08-08 VITALS
DIASTOLIC BLOOD PRESSURE: 84 MMHG | OXYGEN SATURATION: 98 % | TEMPERATURE: 97.4 F | SYSTOLIC BLOOD PRESSURE: 155 MMHG | HEART RATE: 82 BPM

## 2019-08-08 PROCEDURE — 3331090002 HH PPS REVENUE DEBIT

## 2019-08-08 PROCEDURE — 3331090001 HH PPS REVENUE CREDIT

## 2019-08-08 PROCEDURE — G0495 RN CARE TRAIN/EDU IN HH: HCPCS

## 2019-08-09 ENCOUNTER — HOME CARE VISIT (OUTPATIENT)
Dept: HOME HEALTH SERVICES | Facility: HOME HEALTH | Age: 74
End: 2019-08-09
Payer: MEDICARE

## 2019-08-09 PROCEDURE — 3331090001 HH PPS REVENUE CREDIT

## 2019-08-09 PROCEDURE — 3331090002 HH PPS REVENUE DEBIT

## 2019-08-10 PROCEDURE — 3331090002 HH PPS REVENUE DEBIT

## 2019-08-10 PROCEDURE — 3331090001 HH PPS REVENUE CREDIT

## 2019-08-11 PROCEDURE — 3331090001 HH PPS REVENUE CREDIT

## 2019-08-11 PROCEDURE — 3331090002 HH PPS REVENUE DEBIT

## 2019-08-12 ENCOUNTER — HOME CARE VISIT (OUTPATIENT)
Dept: SCHEDULING | Facility: HOME HEALTH | Age: 74
End: 2019-08-12
Payer: MEDICARE

## 2019-08-12 ENCOUNTER — HOME CARE VISIT (OUTPATIENT)
Dept: HOME HEALTH SERVICES | Facility: HOME HEALTH | Age: 74
End: 2019-08-12
Payer: MEDICARE

## 2019-08-12 VITALS
SYSTOLIC BLOOD PRESSURE: 133 MMHG | RESPIRATION RATE: 20 BRPM | TEMPERATURE: 96.1 F | OXYGEN SATURATION: 98 % | DIASTOLIC BLOOD PRESSURE: 76 MMHG | HEART RATE: 87 BPM

## 2019-08-12 PROCEDURE — 3331090002 HH PPS REVENUE DEBIT

## 2019-08-12 PROCEDURE — G0495 RN CARE TRAIN/EDU IN HH: HCPCS

## 2019-08-12 PROCEDURE — 3331090001 HH PPS REVENUE CREDIT

## 2019-08-12 NOTE — ROUTINE PROCESS
Bedside shift change report given to Con Barakat RN (oncoming nurse) by Argenis Vieira RN (offgoing nurse). Report included the following information SBAR, Kardex and MAR. done

## 2019-08-13 ENCOUNTER — HOME CARE VISIT (OUTPATIENT)
Dept: SCHEDULING | Facility: HOME HEALTH | Age: 74
End: 2019-08-13
Payer: MEDICARE

## 2019-08-13 PROCEDURE — 3331090002 HH PPS REVENUE DEBIT

## 2019-08-13 PROCEDURE — G0152 HHCP-SERV OF OT,EA 15 MIN: HCPCS

## 2019-08-13 PROCEDURE — 3331090001 HH PPS REVENUE CREDIT

## 2019-08-13 PROCEDURE — G0157 HHC PT ASSISTANT EA 15: HCPCS

## 2019-08-14 VITALS
TEMPERATURE: 97.9 F | OXYGEN SATURATION: 98 % | HEART RATE: 79 BPM | DIASTOLIC BLOOD PRESSURE: 80 MMHG | SYSTOLIC BLOOD PRESSURE: 130 MMHG

## 2019-08-14 PROCEDURE — 3331090001 HH PPS REVENUE CREDIT

## 2019-08-14 PROCEDURE — 3331090002 HH PPS REVENUE DEBIT

## 2019-08-15 ENCOUNTER — HOME CARE VISIT (OUTPATIENT)
Dept: SCHEDULING | Facility: HOME HEALTH | Age: 74
End: 2019-08-15
Payer: MEDICARE

## 2019-08-15 VITALS
OXYGEN SATURATION: 98 % | TEMPERATURE: 97.8 F | DIASTOLIC BLOOD PRESSURE: 62 MMHG | SYSTOLIC BLOOD PRESSURE: 110 MMHG | HEART RATE: 95 BPM

## 2019-08-15 PROCEDURE — G0157 HHC PT ASSISTANT EA 15: HCPCS

## 2019-08-15 PROCEDURE — 3331090002 HH PPS REVENUE DEBIT

## 2019-08-15 PROCEDURE — 3331090001 HH PPS REVENUE CREDIT

## 2019-08-15 PROCEDURE — G0152 HHCP-SERV OF OT,EA 15 MIN: HCPCS

## 2019-08-16 VITALS
TEMPERATURE: 97.7 F | DIASTOLIC BLOOD PRESSURE: 70 MMHG | OXYGEN SATURATION: 96 % | HEART RATE: 83 BPM | SYSTOLIC BLOOD PRESSURE: 106 MMHG

## 2019-08-16 PROCEDURE — 3331090002 HH PPS REVENUE DEBIT

## 2019-08-16 PROCEDURE — 3331090001 HH PPS REVENUE CREDIT

## 2019-08-17 PROCEDURE — 3331090001 HH PPS REVENUE CREDIT

## 2019-08-17 PROCEDURE — 3331090002 HH PPS REVENUE DEBIT

## 2019-08-18 PROCEDURE — 3331090001 HH PPS REVENUE CREDIT

## 2019-08-18 PROCEDURE — 3331090002 HH PPS REVENUE DEBIT

## 2019-08-19 ENCOUNTER — APPOINTMENT (OUTPATIENT)
Dept: GENERAL RADIOLOGY | Age: 74
DRG: 190 | End: 2019-08-19
Attending: EMERGENCY MEDICINE
Payer: MEDICARE

## 2019-08-19 ENCOUNTER — HOSPITAL ENCOUNTER (INPATIENT)
Age: 74
LOS: 4 days | Discharge: HOME HEALTH | DRG: 190 | End: 2019-08-23
Attending: EMERGENCY MEDICINE | Admitting: HOSPITALIST
Payer: MEDICARE

## 2019-08-19 DIAGNOSIS — J18.9 PNEUMONIA OF RIGHT LOWER LOBE DUE TO INFECTIOUS ORGANISM: ICD-10-CM

## 2019-08-19 DIAGNOSIS — J44.1 ACUTE EXACERBATION OF CHRONIC OBSTRUCTIVE PULMONARY DISEASE (COPD) (HCC): Primary | ICD-10-CM

## 2019-08-19 PROBLEM — I48.0 PAROXYSMAL ATRIAL FIBRILLATION (HCC): Status: ACTIVE | Noted: 2019-08-19

## 2019-08-19 PROBLEM — R74.8 ELEVATED CREATINE KINASE: Status: ACTIVE | Noted: 2019-08-19

## 2019-08-19 PROBLEM — J96.11 CHRONIC RESPIRATORY FAILURE WITH HYPOXIA (HCC): Status: ACTIVE | Noted: 2019-08-19

## 2019-08-19 LAB
ALBUMIN SERPL-MCNC: 3.2 G/DL (ref 3.4–5)
ALBUMIN/GLOB SERPL: 1.1 {RATIO} (ref 0.8–1.7)
ALP SERPL-CCNC: 77 U/L (ref 45–117)
ALT SERPL-CCNC: 13 U/L (ref 16–61)
ANION GAP SERPL CALC-SCNC: 10 MMOL/L (ref 3–18)
ARTERIAL PATENCY WRIST A: YES
AST SERPL-CCNC: 12 U/L (ref 10–38)
ATRIAL RATE: 116 BPM
BASE DEFICIT BLD-SCNC: 2 MMOL/L
BASOPHILS # BLD: 0 K/UL (ref 0–0.1)
BASOPHILS NFR BLD: 0 % (ref 0–3)
BDY SITE: ABNORMAL
BILIRUB SERPL-MCNC: 0.4 MG/DL (ref 0.2–1)
BNP SERPL-MCNC: 135 PG/ML (ref 0–1800)
BUN SERPL-MCNC: 18 MG/DL (ref 7–18)
BUN/CREAT SERPL: 12 (ref 12–20)
CALCIUM SERPL-MCNC: 8.7 MG/DL (ref 8.5–10.1)
CALCULATED P AXIS, ECG09: 74 DEGREES
CALCULATED R AXIS, ECG10: 34 DEGREES
CALCULATED T AXIS, ECG11: 64 DEGREES
CHLORIDE SERPL-SCNC: 98 MMOL/L (ref 100–111)
CK MB CFR SERPL CALC: 5 % (ref 0–4)
CK MB SERPL-MCNC: 5.7 NG/ML (ref 5–25)
CK SERPL-CCNC: 113 U/L (ref 39–308)
CO2 SERPL-SCNC: 24 MMOL/L (ref 21–32)
CREAT SERPL-MCNC: 1.53 MG/DL (ref 0.6–1.3)
D DIMER PPP FEU-MCNC: 0.48 UG/ML(FEU)
DIAGNOSIS, 93000: NORMAL
DIFFERENTIAL METHOD BLD: ABNORMAL
EOSINOPHIL # BLD: 1.3 K/UL (ref 0–0.4)
EOSINOPHIL NFR BLD: 9 % (ref 0–5)
ERYTHROCYTE [DISTWIDTH] IN BLOOD BY AUTOMATED COUNT: 14.2 % (ref 11.6–14.5)
GAS FLOW.O2 O2 DELIVERY SYS: ABNORMAL L/MIN
GLOBULIN SER CALC-MCNC: 2.9 G/DL (ref 2–4)
GLUCOSE SERPL-MCNC: 118 MG/DL (ref 74–99)
HCO3 BLD-SCNC: 22.8 MMOL/L (ref 22–26)
HCT VFR BLD AUTO: 33.6 % (ref 36–48)
HGB BLD-MCNC: 11.4 G/DL (ref 13–16)
LACTATE BLD-SCNC: 1.09 MMOL/L (ref 0.4–2)
LYMPHOCYTES # BLD: 0.8 K/UL (ref 0.8–3.5)
LYMPHOCYTES NFR BLD: 6 % (ref 20–51)
MCH RBC QN AUTO: 28.5 PG (ref 24–34)
MCHC RBC AUTO-ENTMCNC: 33.9 G/DL (ref 31–37)
MCV RBC AUTO: 84 FL (ref 74–97)
MONOCYTES # BLD: 1.4 K/UL (ref 0–1)
MONOCYTES NFR BLD: 10 % (ref 2–9)
NEUTS SEG # BLD: 10.4 K/UL (ref 1.8–8)
NEUTS SEG NFR BLD: 75 % (ref 42–75)
O2/TOTAL GAS SETTING VFR VENT: 35 %
P-R INTERVAL, ECG05: 158 MS
PCO2 BLD: 37.2 MMHG (ref 35–45)
PEEP RESPIRATORY: 6 CMH2O
PH BLD: 7.4 [PH] (ref 7.35–7.45)
PIP ISTAT,IPIP: 16
PLATELET # BLD AUTO: 270 K/UL (ref 135–420)
PLATELET COMMENTS,PCOM: ABNORMAL
PMV BLD AUTO: 8.9 FL (ref 9.2–11.8)
PO2 BLD: 151 MMHG (ref 80–100)
POTASSIUM SERPL-SCNC: 4.1 MMOL/L (ref 3.5–5.5)
PROT SERPL-MCNC: 6.1 G/DL (ref 6.4–8.2)
Q-T INTERVAL, ECG07: 320 MS
QRS DURATION, ECG06: 82 MS
QTC CALCULATION (BEZET), ECG08: 444 MS
RBC # BLD AUTO: 4 M/UL (ref 4.7–5.5)
RBC MORPH BLD: ABNORMAL
SAO2 % BLD: 99 % (ref 92–97)
SERVICE CMNT-IMP: ABNORMAL
SODIUM SERPL-SCNC: 132 MMOL/L (ref 136–145)
SPECIMEN TYPE: ABNORMAL
TOTAL RESP. RATE, ITRR: 18
TROPONIN I SERPL-MCNC: <0.02 NG/ML (ref 0–0.04)
VENTRICULAR RATE, ECG03: 116 BPM
WBC # BLD AUTO: 13.9 K/UL (ref 4.6–13.2)

## 2019-08-19 PROCEDURE — 74011000250 HC RX REV CODE- 250: Performed by: HOSPITALIST

## 2019-08-19 PROCEDURE — 99285 EMERGENCY DEPT VISIT HI MDM: CPT

## 2019-08-19 PROCEDURE — 94660 CPAP INITIATION&MGMT: CPT

## 2019-08-19 PROCEDURE — 85379 FIBRIN DEGRADATION QUANT: CPT

## 2019-08-19 PROCEDURE — 3331090002 HH PPS REVENUE DEBIT

## 2019-08-19 PROCEDURE — 77010033678 HC OXYGEN DAILY

## 2019-08-19 PROCEDURE — 93005 ELECTROCARDIOGRAM TRACING: CPT

## 2019-08-19 PROCEDURE — 87040 BLOOD CULTURE FOR BACTERIA: CPT

## 2019-08-19 PROCEDURE — 5A09357 ASSISTANCE WITH RESPIRATORY VENTILATION, LESS THAN 24 CONSECUTIVE HOURS, CONTINUOUS POSITIVE AIRWAY PRESSURE: ICD-10-PCS | Performed by: HOSPITALIST

## 2019-08-19 PROCEDURE — 94762 N-INVAS EAR/PLS OXIMTRY CONT: CPT

## 2019-08-19 PROCEDURE — 80053 COMPREHEN METABOLIC PANEL: CPT

## 2019-08-19 PROCEDURE — 82803 BLOOD GASES ANY COMBINATION: CPT

## 2019-08-19 PROCEDURE — 82550 ASSAY OF CK (CPK): CPT

## 2019-08-19 PROCEDURE — 71045 X-RAY EXAM CHEST 1 VIEW: CPT

## 2019-08-19 PROCEDURE — 94640 AIRWAY INHALATION TREATMENT: CPT

## 2019-08-19 PROCEDURE — 74011250636 HC RX REV CODE- 250/636: Performed by: HOSPITALIST

## 2019-08-19 PROCEDURE — 83880 ASSAY OF NATRIURETIC PEPTIDE: CPT

## 2019-08-19 PROCEDURE — 77030035694 HC MSK BIPAP FLL FAC PERFMAX PHIL -B

## 2019-08-19 PROCEDURE — 36600 WITHDRAWAL OF ARTERIAL BLOOD: CPT

## 2019-08-19 PROCEDURE — 3331090001 HH PPS REVENUE CREDIT

## 2019-08-19 PROCEDURE — 74011250636 HC RX REV CODE- 250/636: Performed by: EMERGENCY MEDICINE

## 2019-08-19 PROCEDURE — 74011000258 HC RX REV CODE- 258: Performed by: EMERGENCY MEDICINE

## 2019-08-19 PROCEDURE — 65660000000 HC RM CCU STEPDOWN

## 2019-08-19 PROCEDURE — 85025 COMPLETE CBC W/AUTO DIFF WBC: CPT

## 2019-08-19 PROCEDURE — 96374 THER/PROPH/DIAG INJ IV PUSH: CPT

## 2019-08-19 PROCEDURE — 74011250637 HC RX REV CODE- 250/637: Performed by: HOSPITALIST

## 2019-08-19 PROCEDURE — 83605 ASSAY OF LACTIC ACID: CPT

## 2019-08-19 PROCEDURE — 74011000250 HC RX REV CODE- 250: Performed by: EMERGENCY MEDICINE

## 2019-08-19 RX ORDER — BUDESONIDE 0.5 MG/2ML
500 INHALANT ORAL
Status: DISCONTINUED | OUTPATIENT
Start: 2019-08-19 | End: 2019-08-22

## 2019-08-19 RX ORDER — SODIUM CHLORIDE 9 MG/ML
75 INJECTION, SOLUTION INTRAVENOUS CONTINUOUS
Status: DISCONTINUED | OUTPATIENT
Start: 2019-08-19 | End: 2019-08-20

## 2019-08-19 RX ORDER — ARFORMOTEROL TARTRATE 15 UG/2ML
15 SOLUTION RESPIRATORY (INHALATION)
Status: DISCONTINUED | OUTPATIENT
Start: 2019-08-19 | End: 2019-08-23 | Stop reason: HOSPADM

## 2019-08-19 RX ORDER — ALBUTEROL SULFATE 2.5 MG/.5ML
10 SOLUTION RESPIRATORY (INHALATION)
Status: COMPLETED | OUTPATIENT
Start: 2019-08-19 | End: 2019-08-19

## 2019-08-19 RX ORDER — ACETAMINOPHEN 325 MG/1
650 TABLET ORAL
Status: DISCONTINUED | OUTPATIENT
Start: 2019-08-19 | End: 2019-08-23 | Stop reason: HOSPADM

## 2019-08-19 RX ORDER — IPRATROPIUM BROMIDE AND ALBUTEROL SULFATE 2.5; .5 MG/3ML; MG/3ML
3 SOLUTION RESPIRATORY (INHALATION)
Status: DISCONTINUED | OUTPATIENT
Start: 2019-08-19 | End: 2019-08-20

## 2019-08-19 RX ORDER — ONDANSETRON 2 MG/ML
4 INJECTION INTRAMUSCULAR; INTRAVENOUS
Status: DISCONTINUED | OUTPATIENT
Start: 2019-08-19 | End: 2019-08-23 | Stop reason: HOSPADM

## 2019-08-19 RX ORDER — VANCOMYCIN/0.9 % SOD CHLORIDE 1.5G/250ML
1500 PLASTIC BAG, INJECTION (ML) INTRAVENOUS EVERY 24 HOURS
Status: DISCONTINUED | OUTPATIENT
Start: 2019-08-19 | End: 2019-08-23 | Stop reason: HOSPADM

## 2019-08-19 RX ORDER — DIPHENHYDRAMINE HYDROCHLORIDE 50 MG/ML
12.5 INJECTION, SOLUTION INTRAMUSCULAR; INTRAVENOUS
Status: DISCONTINUED | OUTPATIENT
Start: 2019-08-19 | End: 2019-08-23 | Stop reason: HOSPADM

## 2019-08-19 RX ORDER — ALBUTEROL SULFATE 0.83 MG/ML
10 SOLUTION RESPIRATORY (INHALATION)
Status: DISCONTINUED | OUTPATIENT
Start: 2019-08-19 | End: 2019-08-19 | Stop reason: ALTCHOICE

## 2019-08-19 RX ORDER — IPRATROPIUM BROMIDE AND ALBUTEROL SULFATE 2.5; .5 MG/3ML; MG/3ML
3 SOLUTION RESPIRATORY (INHALATION)
Status: COMPLETED | OUTPATIENT
Start: 2019-08-19 | End: 2019-08-19

## 2019-08-19 RX ORDER — NALOXONE HYDROCHLORIDE 0.4 MG/ML
0.4 INJECTION, SOLUTION INTRAMUSCULAR; INTRAVENOUS; SUBCUTANEOUS AS NEEDED
Status: DISCONTINUED | OUTPATIENT
Start: 2019-08-19 | End: 2019-08-23 | Stop reason: HOSPADM

## 2019-08-19 RX ORDER — PIPERACILLIN SODIUM, TAZOBACTAM SODIUM 3; .375 G/15ML; G/15ML
3.38 INJECTION, POWDER, LYOPHILIZED, FOR SOLUTION INTRAVENOUS
Status: DISCONTINUED | OUTPATIENT
Start: 2019-08-19 | End: 2019-08-19 | Stop reason: DRUGHIGH

## 2019-08-19 RX ORDER — METOPROLOL TARTRATE 25 MG/1
25 TABLET, FILM COATED ORAL EVERY 12 HOURS
Status: DISCONTINUED | OUTPATIENT
Start: 2019-08-19 | End: 2019-08-23 | Stop reason: HOSPADM

## 2019-08-19 RX ADMIN — PIPERACILLIN SODIUM,TAZOBACTAM SODIUM 3.38 G: 3; .375 INJECTION, POWDER, FOR SOLUTION INTRAVENOUS at 23:35

## 2019-08-19 RX ADMIN — BUDESONIDE 500 MCG: 0.5 INHALANT RESPIRATORY (INHALATION) at 23:06

## 2019-08-19 RX ADMIN — ALBUTEROL SULFATE 10 MG: 2.5 SOLUTION RESPIRATORY (INHALATION) at 14:56

## 2019-08-19 RX ADMIN — METHYLPREDNISOLONE SODIUM SUCCINATE 60 MG: 125 INJECTION, POWDER, FOR SOLUTION INTRAMUSCULAR; INTRAVENOUS at 23:35

## 2019-08-19 RX ADMIN — IPRATROPIUM BROMIDE AND ALBUTEROL SULFATE 3 ML: .5; 3 SOLUTION RESPIRATORY (INHALATION) at 23:07

## 2019-08-19 RX ADMIN — METHYLPREDNISOLONE SODIUM SUCCINATE 60 MG: 125 INJECTION, POWDER, FOR SOLUTION INTRAMUSCULAR; INTRAVENOUS at 18:41

## 2019-08-19 RX ADMIN — APIXABAN 5 MG: 5 TABLET, FILM COATED ORAL at 21:42

## 2019-08-19 RX ADMIN — IPRATROPIUM BROMIDE AND ALBUTEROL SULFATE 3 ML: .5; 3 SOLUTION RESPIRATORY (INHALATION) at 14:55

## 2019-08-19 RX ADMIN — METOPROLOL TARTRATE 25 MG: 25 TABLET, FILM COATED ORAL at 21:42

## 2019-08-19 RX ADMIN — SODIUM CHLORIDE 75 ML/HR: 900 INJECTION, SOLUTION INTRAVENOUS at 21:54

## 2019-08-19 RX ADMIN — PIPERACILLIN SODIUM,TAZOBACTAM SODIUM 3.38 G: 3; .375 INJECTION, POWDER, FOR SOLUTION INTRAVENOUS at 16:17

## 2019-08-19 RX ADMIN — ARFORMOTEROL TARTRATE 15 MCG: 15 SOLUTION RESPIRATORY (INHALATION) at 23:06

## 2019-08-19 NOTE — ED TRIAGE NOTES
Pt arrive per EMS on CPAP w/ c/o respiratory distress. EMS reports pt was tripoding upon arrival with expiratory wheezing. Pt has hx of COPD and uses 2L O2 at home.

## 2019-08-19 NOTE — PROGRESS NOTES
Patient arrived to ED by EMS on CPAP. Placed patient on BiPAP: 16/6 FI02 35%. Patient alert and talking. Good tidal volumes, minute ventilation and SP02 on settings Stat albuterol given inline. ABG's pending.

## 2019-08-19 NOTE — PROGRESS NOTES
Post BIPAP ABG's good. Dr. Ana Duarte notified. BiPAP discontinued and placed patient on nasal cannula at 2 liter with SP02 95% Patient states he's breathing much better.  Breath sounds significantly improved, mild expiratory wheezes still persist.

## 2019-08-19 NOTE — ED PROVIDER NOTES
EMERGENCY DEPARTMENT HISTORY AND PHYSICAL EXAM    Date: 8/19/2019  Patient Name: Alondra Melgoza    History of Presenting Illness     Chief Complaint   Patient presents with    Respiratory Distress         History Provided By: Patient    Chief Complaint: SOB and cough  Duration: Days  Timing:  Progressive  Location: Chest  Quality: Tightness  Severity: Severe  Modifying Factors: cough, exertion  Associated Symptoms: denies any other associated signs or symptoms    Additional History (Context):   2:52 PM  Alondra Melgoza is a 66 y.o. male with PMHX of COPD who presents to the emergency department C/O SOB. Associated sxs include wheezing. Pt denies chest pain, and any other sxs or complaints. Patient has had progressive cough over the last week and a half not improved with home nebulizers. He is not on steroids. He has a history of COPD and prior intubation. At today's shortness of breath got worse to call 911. In the field the patient was tripoding necessitating CPAP. On arrival the patient is in respiratory distress.     PCP: Fernando Nance MD    Current Facility-Administered Medications   Medication Dose Route Frequency Provider Last Rate Last Dose    piperacillin-tazobactam (ZOSYN) 3.375 g in 0.9% sodium chloride (MBP/ADV) 100 mL MBP  3.375 g IntraVENous Q6H [place]  mL/hr at 08/19/19 1617 3.375 g at 08/19/19 1617    sodium chloride (OCEAN) 0.65 % nasal squeeze bottle 2 Spray  2 Spray Both Nostrils Q2H PRN Lilliana Samuel MD        methylPREDNISolone (PF) (Solu-MEDROL) injection 60 mg  60 mg IntraVENous Q6H Lilliana Samuel MD   60 mg at 08/19/19 1841    budesonide (PULMICORT) 500 mcg/2 ml nebulizer suspension  500 mcg Nebulization BID RT Lilliana Samuel MD        arformoterol [facility]) neb solution 15 mcg  15 mcg Nebulization BID RT Lilliana Samuel MD        albuterol-ipratropium (DUO-NEB) 2.5 MG-0.5 MG/3 ML  3 mL Nebulization Q4H PRN Lilliana Samuel MD        apixaban Maryanne Castrotock) tablet 5 mg  5 mg Oral BID Lilliana Samuel MD 5 mg at 08/19/19 2142    metoprolol tartrate (LOPRESSOR) tablet 25 mg  25 mg Oral Q12H Anuja Spence MD   25 mg at 08/19/19 2142    Vancomycin - Pharmacokinetic Dosing  1 Each Other Rx Dosing/Monitoring Anuja Spence MD        vancomycin (VANCOCIN) 1500 mg in  ml infusion  1,500 mg IntraVENous Q24H Anuja Spence MD        0.9% sodium chloride infusion  75 mL/hr IntraVENous CONTINUOUS Anuja Spence MD 75 mL/hr at 08/19/19 2154 75 mL/hr at 08/19/19 2154    acetaminophen (TYLENOL) tablet 650 mg  650 mg Oral Q4H PRN Anuja Spence MD        naloxone [facility]) injection 0.4 mg  0.4 mg IntraVENous PRN Anuja Spence MD        diphenhydrAMINE (BENADRYL) injection 12.5 mg  12.5 mg IntraVENous Q4H PRN Anuja Spence MD        ondansetron [facility]) injection 4 mg  4 mg IntraVENous Q4H PRN Anuja Spence MD           Past History     Past Medical History:  Past Medical History:   Diagnosis Date    Cancer [facility])     prostate    COPD [facility] disease) [address]     Hypertension     Pneumonia     Radiation effect        Past Surgical History:  Past Surgical History:   Procedure Laterality Date    HX HERNIA REPAIR         Family History:  Family History   Problem Relation Age of Onset    Heart Disease Mother        Social History:  Social History     Tobacco Use    Smoking status: Former Smoker     Types: Cigarettes    Smokeless tobacco: Never Used   Substance Use Topics    Alcohol use: No    Drug use: Not on file       Allergies: Allergies   Allergen Reactions    Aspirin Other (comments)     \"messes my stomach up\"         Review of Systems   Review of Systems   Constitutional: Negative for chills and fever. HENT: Negative for congestion and sore throat. Eyes: Negative for pain and redness. Respiratory: Positive for cough and shortness of breath. Cardiovascular: Negative for chest pain and palpitations. Gastrointestinal: Negative for abdominal pain, diarrhea, nausea and vomiting.    Endocrine: Negative for polydipsia and polyuria. Genitourinary: Negative for difficulty urinating and dysuria. Musculoskeletal: Negative for back pain and neck pain. Skin: Negative for pallor and rash. Neurological: Negative for headaches. All other systems reviewed and are negative. Physical Exam     Vitals:    08/19/19 1506 08/19/19 1635 08/19/19 1713 08/19/19 2000   BP:  133/53 103/77 153/79   Pulse:  (!) 105 (!) 106 89   Resp:  19 20 20   Temp:  98.3 °F (36.8 °C) 98.4 °F (36.9 °C) 97.3 °F (36.3 °C)   SpO2: 98% 95% 99% 100%   Weight:       Height:         Physical Exam   Constitutional: He is oriented to person, place, and time. He appears well-developed and well-nourished. He appears distressed. HENT:   Head: Normocephalic and atraumatic. Right Ear: External ear normal.   Left Ear: External ear normal.   Nose: Nose normal.   Mouth/Throat: Oropharynx is clear and moist.   Eyes: Pupils are equal, round, and reactive to light. Conjunctivae and EOM are normal.   Neck: Normal range of motion. Neck supple. No JVD present. No tracheal deviation present. No thyromegaly present. Cardiovascular: Normal heart sounds and intact distal pulses. Exam reveals no gallop and no friction rub. No murmur heard. Fast rate and rhythm   Pulmonary/Chest: He is in respiratory distress. He has wheezes. He has no rales. Moderate to severe respiratory distress with increased work of breathing bilateral wheezing with prolonged expiratory phase. Abdominal: Soft. Bowel sounds are normal. He exhibits no distension and no mass. There is no tenderness. There is no rebound and no guarding. Musculoskeletal: Normal range of motion. Neurological: He is alert and oriented to person, place, and time. He has normal reflexes. No cranial nerve deficit. Skin: Skin is warm and dry. No rash noted. Psychiatric: He has a normal mood and affect. His behavior is normal.   Nursing note and vitals reviewed.         Diagnostic Study Results     Labs -     Recent Results (from the past 24 hour(s))   EKG, 12 LEAD, INITIAL    Collection Time: 08/19/19  2:43 PM   Result Value Ref Range    Ventricular Rate 116 BPM    Atrial Rate 116 BPM    P-R Interval 158 ms    QRS Duration 82 ms    Q-T Interval 320 ms    QTC Calculation (Bezet) 444 ms    Calculated P Axis 74 degrees    Calculated R Axis 34 degrees    Calculated T Axis 64 degrees    Diagnosis       Sinus tachycardia  Otherwise normal ECG  Confirmed by Vipin Fontenot MD, [place] ([id number]) on 8/19/2019 8:12:11 PM     CBC WITH AUTOMATED DIFF    Collection Time: 08/19/19  2:45 PM   Result Value Ref Range    WBC 13.9 (H) 4.6 - 13.2 K/uL    RBC 4.00 (L) 4.70 - 5.50 M/uL    HGB 11.4 (L) 13.0 - 16.0 g/dL    HCT 33.6 (L) 36.0 - 48.0 %    MCV 84.0 74.0 - 97.0 FL    MCH 28.5 24.0 - 34.0 PG    MCHC 33.9 31.0 - 37.0 g/dL    RDW 14.2 11.6 - 14.5 %    PLATELET 452 043 - 060 K/uL    MPV 8.9 (L) 9.2 - 11.8 FL    NEUTROPHILS 75 42 - 75 %    LYMPHOCYTES 6 (L) 20 - 51 %    MONOCYTES 10 (H) 2 - 9 %    EOSINOPHILS 9 (H) 0 - 5 %    BASOPHILS 0 0 - 3 %    ABS. NEUTROPHILS 10.4 (H) 1.8 - 8.0 K/UL    ABS. LYMPHOCYTES 0.8 0.8 - 3.5 K/UL    ABS. MONOCYTES 1.4 (H) 0 - 1.0 K/UL    ABS. EOSINOPHILS 1.3 (H) 0.0 - 0.4 K/UL    ABS. BASOPHILS 0.0 0.0 - 0.1 K/UL    PLATELET COMMENTS ADEQUATE PLATELETS      RBC COMMENTS NORMOCYTIC, NORMOCHROMIC      DF MANUAL     METABOLIC PANEL, COMPREHENSIVE    Collection Time: 08/19/19  2:45 PM   Result Value Ref Range    Sodium 132 (L) 136 - 145 mmol/L    Potassium 4.1 3.5 - 5.5 mmol/L    Chloride 98 (L) 100 - 111 mmol/L    CO2 24 21 - 32 mmol/L    Anion gap 10 3.0 - 18 mmol/L    Glucose 118 (H) 74 - 99 mg/dL    BUN 18 7.0 - 18 MG/DL    Creatinine 1.53 (H) 0.6 - 1.3 MG/DL    BUN/Creatinine ratio 12 12 - 20      GFR est AA 54 (L) >60 ml/min/1.73m2    GFR est non-AA 44 (L) >60 ml/min/1.73m2    Calcium 8.7 8.5 - 10.1 MG/DL    Bilirubin, total 0.4 0.2 - 1.0 MG/DL    ALT (SGPT) 13 (L) 16 - 61 U/L    AST (SGOT) 12 10 - 38 U/L    Alk.  phosphatase 77 45 - 117 U/L    Protein, total 6.1 (L) 6.4 - 8.2 g/dL    Albumin 3.2 (L) 3.4 - 5.0 g/dL    Globulin 2.9 2.0 - 4.0 g/dL    A-G Ratio 1.1 0.8 - 1.7     CARDIAC PANEL,(CK, CKMB & TROPONIN)    Collection Time: 08/19/19  2:45 PM   Result Value Ref Range     39 - 308 U/L    CK - MB 5.7 (H) <3.6 ng/ml    CK-MB Index 5.0 (H) 0.0 - 4.0 %    Troponin-I, QT <0.02 0.0 - 0.045 NG/ML   D DIMER    Collection Time: 08/19/19  2:45 PM   Result Value Ref Range    D DIMER 0.48 (H) <0.46 ug/ml(FEU)   NT-PRO BNP    Collection Time: 08/19/19  2:45 PM   Result Value Ref Range    NT pro- 0 - 1,800 PG/ML   POC LACTIC ACID    Collection Time: 08/19/19  2:46 PM   Result Value Ref Range    Lactic Acid (POC) 1.09 0.40 - 2.00 mmol/L   POC G3    Collection Time: 08/19/19  3:25 PM   Result Value Ref Range    Device: BIPAP      FIO2 (POC) 35 %    pH (POC) 7.396 7.35 - 7.45      pCO2 (POC) 37.2 35.0 - 45.0 MMHG    pO2 (POC) 151 (H) 80 - 100 MMHG    HCO3 (POC) 22.8 22 - 26 MMOL/L    sO2 (POC) 99 (H) 92 - 97 %    Base deficit (POC) 2 mmol/L    PEEP/CPAP (POC) 6 cmH2O    PIP (POC) 16      Allens test (POC) YES      Total resp. rate 18      Site RIGHT RADIAL      Specimen type (POC) ARTERIAL      Performed by Demetrio Sorensen        Radiologic Studies -  XR CHEST PORT   Final Result   IMPRESSION:         1. Mild asymmetric opacity at the right mid and lower lung base, potentially   atelectasis or airspace disease. 2. Elliptical partially calcified right pleural based lesion unchanged. CT Results  (Last 48 hours)    None        CXR Results  (Last 48 hours)               08/19/19 1503  XR CHEST PORT Final result    Impression:  IMPRESSION:           1. Mild asymmetric opacity at the right mid and lower lung base, potentially   atelectasis or airspace disease. 2. Elliptical partially calcified right pleural based lesion unchanged.        Narrative:  EXAM: XR CHEST PORT       CLINICAL INDICATION/HISTORY: Respiratory distress -Additional: Chronic obstructive pulmonary disease       COMPARISON: Several prior exams, most recently chest radiograph 7/12/2019 as   well as prior CT scan of the chest 7/7/2019       TECHNIQUE: Frontal view of the chest       _______________       FINDINGS:       HEART AND MEDIASTINUM: Normal appearing cardiac size and mediastinal contours. LUNGS AND PLEURAL SPACES: Asymmetric opacity the right lung base is noted. Pleural-based calcification involving the anterior right hemithorax as noted on   multiple prior exams. No pneumothorax. No definite pleural effusion.        BONY THORAX AND SOFT TISSUES: No acute osseous abnormality       _______________                 Medications given in the ED-  Medications   piperacillin-tazobactam (ZOSYN) 3.375 g in 0.9% sodium chloride (MBP/ADV) 100 mL MBP (3.375 g IntraVENous New Bag 8/19/19 1617)   sodium chloride (OCEAN) 0.65 % nasal squeeze bottle 2 Spray (has no administration in time range)   methylPREDNISolone (PF) (Solu-MEDROL) injection 60 mg (60 mg IntraVENous Given 8/19/19 1841)   budesonide (PULMICORT) 500 mcg/2 ml nebulizer suspension (has no administration in time range)   arformoterol (BROVANA) neb solution 15 mcg (has no administration in time range)   albuterol-ipratropium (DUO-NEB) 2.5 MG-0.5 MG/3 ML (has no administration in time range)   apixaban (ELIQUIS) tablet 5 mg (5 mg Oral Given 8/19/19 2142)   metoprolol tartrate (LOPRESSOR) tablet 25 mg (25 mg Oral Given 8/19/19 2142)   Vancomycin - Pharmacokinetic Dosing (has no administration in time range)   vancomycin (VANCOCIN) 1500 mg in  ml infusion (has no administration in time range)   0.9% sodium chloride infusion (75 mL/hr IntraVENous New Bag 8/19/19 2154)   acetaminophen (TYLENOL) tablet 650 mg (has no administration in time range)   naloxone (NARCAN) injection 0.4 mg (has no administration in time range)   diphenhydrAMINE (BENADRYL) injection 12.5 mg (has no administration in time range) ondansetron (ZOFRAN) injection 4 mg (has no administration in time range)   albuterol-ipratropium (DUO-NEB) 2.5 MG-0.5 MG/3 ML (3 mL Nebulization Given 8/19/19 1455)   albuterol CONCENTRATE 2.5mg/0.5 mL neb soln (10 mg Nebulization Given 8/19/19 1456)         Medical Decision Making   I am the first provider for this patient. I reviewed the vital signs, available nursing notes, past medical history, past surgical history, family history and social history. Vital Signs-Reviewed the patient's vital signs. EKG interpretation: (Preliminary)  14:43  Sinus tachycardia at 116 with normal axis normal intervals and no acute ST-T wave changes  ECG read by Brisa Gutierrez MD    Records Reviewed: Nursing Notes    Provider Notes (Medical Decision Making):   DDx-COPD exacerbation, respiratory failure, pneumonia, CHF, PE, ACS    Procedures:  Procedures    ED Course:   Initial assessment performed. The patients presenting problems have been discussed, and they are in agreement with the care plan formulated and outlined with them. I have encouraged them to ask questions as they arise throughout their visit. 16:20  Discussed with Dr. Silvina Oliver who agrees with admission      Diagnosis and Disposition   DISCUSSION:  The patient presented in respiratory distress necessitating BiPAP. He was given in-line nebulizers with DuoNeb and albuterol. He was given Solu-Medrol IV. ECG and troponin show no evidence of acute coronary syndrome. Chest x-ray showed right lower lobe pneumonia. Blood cultures were drawn. Lactic acid normal.  Patient was given Zosyn IV. Patient improved markedly on BiPAP with nebulizers and was weaned off of BiPAP. Patient was placed on nasal cannula with resolution of respiratory distress. Serum ABG showed no evidence of respiratory failure. ABG was adequate. Labs were remarkable for leukocytosis, elevated creatinine. Age-adjusted d-dimer was normal doubt PE.   Case discussed with hospitalist who agreed with admission for further evaluation. Critical Care Time: 67 minutes    Core Measures:  For Hospitalized Patients:    1. Hospitalization Decision Time:  The decision to hospitalize the patient was made by JASWANT Ramos at 16:00 on 8/19/2019    2. Aspirin: Aspirin was not given because the patient did not present with a stroke at the time of their Emergency Department evaluation    4:16 PM  Patient is being admitted to the hospital by Dr. Contreras Fenton. The results of their tests and reasons for their admission have been discussed with them and/or available family. They convey agreement and understanding for the need to be admitted and for their admission diagnosis. CONDITIONS ON ADMISSION:  Sepsis is not present at the time of admission. Deep Vein Thrombosis is not present at the time of admission. Thrombosis is not present at the time of admission. Urinary Tract Infection is not present at the time of admission. Pneumonia is present at the time of admission. MRSA is not present at the time of admission. Wound infection is not present at the time of admission. Pressure Ulcer is not present at the time of admission. CLINICAL IMPRESSION:    1. Acute exacerbation of chronic obstructive pulmonary disease (COPD) ([address]    2. Pneumonia of right lower lobe due to infectious organism ([address]        PLAN:  1. Admission to medicine        Please note that this dictation was completed with [employer], the computer voice recognition software. Quite often unanticipated grammatical, syntax, homophones, and other interpretive errors are inadvertently transcribed by the computer software. Please disregard these errors. Please excuse any errors that have escaped final proofreading.

## 2019-08-19 NOTE — ROUTINE PROCESS
TRANSFER - OUT REPORT:    Verbal report given to CAITLIN Ceja(name) on Alondra Melgoza  being transferred to (unit) for routine progression of care       Report consisted of patients Situation, Background, Assessment and   Recommendations(SBAR). Information from the following report(s) SBAR, ED Summary, MAR, Recent Results and Cardiac Rhythm Sinus Tach was reviewed with the receiving nurse. Lines:   Peripheral IV 08/19/19 Left Forearm (Active)   Site Assessment Clean, dry, & intact 8/19/2019  2:45 PM   Phlebitis Assessment 0 8/19/2019  2:45 PM   Infiltration Assessment 0 8/19/2019  2:45 PM   Dressing Status Clean, dry, & intact 8/19/2019  2:45 PM   Dressing Type Transparent 8/19/2019  2:45 PM   Hub Color/Line Status Pink;Patent; Flushed 8/19/2019  2:45 PM   Action Taken Blood drawn 8/19/2019  2:45 PM       Peripheral IV 08/19/19 Right Antecubital (Active)   Site Assessment Clean, dry, & intact 8/19/2019  3:35 PM   Phlebitis Assessment 0 8/19/2019  3:35 PM   Infiltration Assessment 0 8/19/2019  3:35 PM   Dressing Status Clean, dry, & intact 8/19/2019  3:35 PM   Dressing Type Transparent 8/19/2019  3:35 PM   Hub Color/Line Status Green;Patent; Flushed 8/19/2019  3:35 PM   Action Taken Blood drawn 8/19/2019  3:35 PM        Opportunity for questions and clarification was provided.       Patient transported with:   Monitor  O2 @ 2 liters  Registered Nurse

## 2019-08-19 NOTE — H&P
History & Physical    Patient: Eugena Cheadle MRN: [id number]  CSN: [id number]    YOB: 1945  Age: 66 y.o. Sex: male      DOA: 8/19/2019  Primary Care Provider:  Viviana Fraser MD      Assessment/Plan     Hospital Problems  Date Reviewed: 7/6/2019          Codes Class Noted POA    RLL pneumonia [facility]) ICD-10-CM: J18.1  ICD-9-CM: 486  8/19/2019 Unknown        Paroxysmal atrial fibrillation ([facility] ICD-10-CM: I48.0  ICD-9-CM: 427.31  8/19/2019 Unknown        Chronic respiratory failure with hypoxia ([facility] ICD-10-CM: J96.11  ICD-9-CM: 518.83, 799.02  8/19/2019 Unknown        Elevated creatine kinase ICD-10-CM: R74.8  ICD-9-CM: 790.5  8/19/2019 Unknown        COPD with acute exacerbation [facility]) ICD-10-CM: J44.1  ICD-9-CM: 491.21  7/6/2019 Yes        COPD (chronic obstructive pulmonary disease) ([facility] ICD-10-CM: J44.9  ICD-9-CM: 496  10/2/2014 Unknown        Hypertension ICD-10-CM: I10  ICD-9-CM: 401.9  Unknown Yes                Admit to tele     Copd exacerbation   Iv steroid, breathing treatment with bronchodilator   symptom treatment   Will start empiric iv abx   ssi for glucose control   Educate pt     pna -rt side   Continue vanc and zosyn, aspiration precaution   Sputum cx, strep pneumo, legionella     paf   Continue eliquis and metoprolol     Chronic respiratory failure   With home o2 nc O2  F/u with dr. Lee Ann Maldonado     Elevated cr  Continue monitor ,ns infusion overnight      HTN, accelerated  Continue home medication. Hyponatremia   Will give ns infusion continue monitor     AC:  I have had a discussion with patient and  regarding code status. Particularly, described potential options in event of cardiac or respiratory arrest. I have explained what being full code entails, including cardiorespiratory resuscitation attempts with chest compressions, potential cariodioversion/ \" shocks\" as well as intubation.  I have also explained Do not resuscitate which would mean we allow a natural death with out aggressive interventions. He defer to his advanced directive on the chart -reviewed dnr/I AC 32 min       Estimate  length of stay : 2-3 day    DVT : eliquis    ppi proph  CC: sob/cough        HPI:     Angel Rajan is a 66 y.o. male who hx of chronic respiratory failure, copd, paf came to ER due to sob/cough/wheezing for several days. He used nebulizer for breathing treatment, but not improving. He denies any chest pain. cxr rt side opacity. He was put on bipap in er and received breathing tx . He was able to get rid of bipap and on his home nc O2. Denies any slurred speech/headache/cp/n/v/blurred vission/d/c/palpitation/gait change/bleeding. Denies smoking/ any alcohol or drug use. Visit Vitals  /79   Pulse 89   Temp 97.3 °F (36.3 °C)   Resp 20   Ht 5' 8\" (1.727 m)   Wt 88.9 kg (196 lb)   SpO2 100%   BMI 29.80 kg/m²    O2 Flow Rate (L/min): 2 l/min O2 Device: Nasal cannula      Past Medical History:   Diagnosis Date    Cancer ([address]     prostate    COPD (chronic obstructive pulmonary disease) ([address]     Hypertension     Pneumonia     Radiation effect        Past Surgical History:   Procedure Laterality Date    HX HERNIA REPAIR         Family History   Problem Relation Age of Onset    Heart Disease Mother        Social History     Socioeconomic History    Marital status:      Spouse name: Not on file    Number of children: Not on file    Years of education: Not on file    Highest education level: Not on file   Tobacco Use    Smoking status: Former Smoker     Types: Cigarettes    Smokeless tobacco: Never Used   Substance and Sexual Activity    Alcohol use: No       Prior to Admission medications    Medication Sig Start Date End Date Taking? Authorizing Provider   methylPREDNISolone (MEDROL) 4 mg tab Take 4 mg by mouth Follow dosing instructions. Provider, Historical   diphenhydrAMINE (BENADRYL) 25 mg capsule Take 25 mg by mouth nightly as needed for Itching.     Provider, Historical   OXYGEN-AIR DELIVERY SYSTEMS 2 L by Nasal route continuous. Provider, Historical   fluticasone propionate (FLONASE) 50 mcg/actuation nasal spray 2 Sprays by Both Nostrils route daily. Provider, Historical   umeclidinium-vilanterol (ANORO ELLIPTA) 62.5-25 mcg/actuation inhaler Take 1 Puff by inhalation daily. 7/12/19   Dean Cheung MD   predniSONE (DELTASONE) 20 mg tablet 40mg po daily x 5d. 7/12/19   Dean Cheung MD   levoFLOXacin (LEVAQUIN) 500 mg tablet Take 1 Tab by mouth daily. 7/12/19   Dean Cheung MD   apixaban (ELIQUIS) 5 mg tablet Take 1 Tab by mouth two (2) times a day. 7/12/19   Dean Cheung MD   metoprolol tartrate (LOPRESSOR) 25 mg tablet Take 1 Tab by mouth every twelve (12) hours. 7/12/19   Dean Cheung MD   albuterol-ipratropium (DUO-NEB) 2.5 mg-0.5 mg/3 ml nebu 3 mL by Nebulization route every four (4) hours as needed. Patient taking differently: 3 mL by Nebulization route every four (4) hours as needed for Other (Shortness of breath). 2/9/19   Araceli Beaver,    albuterol (PROVENTIL HFA, VENTOLIN HFA, PROAIR HFA) 90 mcg/actuation inhaler Take 2 Puffs by inhalation every four (4) hours as needed for Wheezing or Shortness of Breath. 4/23/18   Renetta Smith PA-C   ipratropium (ATROVENT) 0.03 % nasal spray 2 Sprays every twelve (12) hours. Blayne Bran MD   tamsulosin (FLOMAX) 0.4 mg capsule Take 0.4 mg by mouth daily. Blayne Bran MD   chlorthalidone (HYGROTEN) 25 mg tablet Take  by mouth daily. Blayne Bran MD       Allergies   Allergen Reactions    Aspirin Other (comments)     \"messes my stomach up\"       Review of Systems  Gen: No fever, chills, malaise, weight loss/gain. Heent: No headache, rhinorrhea, epistaxis, ear pain, hearing loss, sinus pain, neck pain/stiffness, sore throat. Heart: No chest pain, palpitations, KUMAR, pnd, or orthopnea. Resp: + cough, no hemoptysis, +wheezing and shortness of breath.    GI: No nausea, vomiting, diarrhea, constipation, melena or hematochezia. : No urinary obstruction, dysuria or hematuria. Derm: No rash, new skin lesion or pruritis. Musc/skeletal: no bone or joint complains. Vasc: No edema, cyanosis or claudication. Endo: No heat/cold intolerance, no polyuria,polydipsia or polyphagia. Neuro: No unilateral weakness, numbness, tingling. No seizures. Heme: No easy bruising or bleeding. Physical Exam:     Physical Exam:  Visit Vitals  /79   Pulse 89   Temp 97.3 °F (36.3 °C)   Resp 20   Ht 5' 8\" (1.727 m)   Wt 88.9 kg (196 lb)   SpO2 100%   BMI 29.80 kg/m²    O2 Flow Rate (L/min): 2 l/min O2 Device: Nasal cannula    Temp (24hrs), Av.4 °F (36.9 °C), Min:97.3 °F (36.3 °C), Max:99.4 °F (37.4 °C)     1901 -  0700  In: -   Out: 200 [Urine:200]   No intake/output data recorded. General:  Awake, cooperative, no distress. Head:  Normocephalic, without obvious abnormality, atraumatic. Eyes:  Conjunctivae/corneas clear, sclera anicteric, PERRL, EOMs intact. Nose: Nares normal. No drainage or sinus tenderness. Throat: Lips, mucosa, and tongue normal. .   Neck: Supple, symmetrical, trachea midline, no adenopathy. Lungs:   Bilateral wheezing        Heart:  Regular rate and rhythm, S1, S2 normal, + murmur, no  click, rub or gallop. Abdomen: Soft, non-tender. Bowel sounds normal. No masses,  No organomegaly. Extremities: Extremities normal, atraumatic, no cyanosis or edema. Pulses: 2+ and symmetric all extremities. Skin: Skin color-pink, texture, turgor normal. No rashes or lesions. Capillary refill normal    Neurologic: CNII-XII intact. No focal motor or sensory deficit.        Labs Reviewed:    BMP:   Lab Results   Component Value Date/Time     (L) 2019 02:45 PM    K 4.1 2019 02:45 PM    CL 98 (L) 2019 02:45 PM    CO2 24 2019 02:45 PM    AGAP 10 2019 02:45 PM     (H) 2019 02:45 PM    BUN 18 08/19/2019 02:45 PM    CREA 1.53 (H) 08/19/2019 02:45 PM    GFRAA 54 (L) 08/19/2019 02:45 PM    GFRNA 44 (L) 08/19/2019 02:45 PM     CMP:   Lab Results   Component Value Date/Time     (L) 08/19/2019 02:45 PM    K 4.1 08/19/2019 02:45 PM    CL 98 (L) 08/19/2019 02:45 PM    CO2 24 08/19/2019 02:45 PM    AGAP 10 08/19/2019 02:45 PM     (H) 08/19/2019 02:45 PM    BUN 18 08/19/2019 02:45 PM    CREA 1.53 (H) 08/19/2019 02:45 PM    GFRAA 54 (L) 08/19/2019 02:45 PM    GFRNA 44 (L) 08/19/2019 02:45 PM    CA 8.7 08/19/2019 02:45 PM    ALB 3.2 (L) 08/19/2019 02:45 PM    TP 6.1 (L) 08/19/2019 02:45 PM    GLOB 2.9 08/19/2019 02:45 PM    AGRAT 1.1 08/19/2019 02:45 PM    SGOT 12 08/19/2019 02:45 PM    ALT 13 (L) 08/19/2019 02:45 PM     CBC:   Lab Results   Component Value Date/Time    WBC 13.9 (H) 08/19/2019 02:45 PM    HGB 11.4 (L) 08/19/2019 02:45 PM    HCT 33.6 (L) 08/19/2019 02:45 PM     08/19/2019 02:45 PM     All Cardiac Markers in the last 24 hours:   Lab Results   Component Value Date/Time     08/19/2019 02:45 PM    CKMB 5.7 (H) 08/19/2019 02:45 PM    CKND1 5.0 (H) 08/19/2019 02:45 PM    TROIQ <0.02 08/19/2019 02:45 PM     Recent Glucose Results:   Lab Results   Component Value Date/Time     (H) 08/19/2019 02:45 PM     ABG:   Lab Results   Component Value Date/Time    PHI 7.396 08/19/2019 03:25 PM    PCO2I 37.2 08/19/2019 03:25 PM    PO2I 151 (H) 08/19/2019 03:25 PM    HCO3I 22.8 08/19/2019 03:25 PM    FIO2I 35 08/19/2019 03:25 PM     COAGS: No results found for: APTT, PTP, INR  Liver Panel:   Lab Results   Component Value Date/Time    ALB 3.2 (L) 08/19/2019 02:45 PM    TP 6.1 (L) 08/19/2019 02:45 PM    GLOB 2.9 08/19/2019 02:45 PM    AGRAT 1.1 08/19/2019 02:45 PM    SGOT 12 08/19/2019 02:45 PM    ALT 13 (L) 08/19/2019 02:45 PM    AP 77 08/19/2019 02:45 PM     Pancreatic Markers: No results found for: AMYLPOCT, AML, LIPPOCT, LPSE    Procedures/imaging: see electronic medical records for all procedures/Xrays and details which were not copied into this note but were reviewed prior to creation of Felipa Fofana MD, Internal Medicine     CC: Marcelo Gibson MD

## 2019-08-19 NOTE — PROGRESS NOTES
1646 TRANSFER - IN REPORT:    Verbal report received from (name) on Phi Powell  being received from ED (unit) for routine progression of care      Report consisted of patients Situation, Background, Assessment and   Recommendations(SBAR). Information from the following report(s) SBAR, Kardex, ED Summary, [facility] and Cardiac Rhythm ST was reviewed with the receiving nurse. Opportunity for questions and clarification was provided. Assessment completed upon patients arrival to unit and care assumed. 1715 Pt arrived on the unit. Care assumed. 1800 Pt don't remember the medication he takes at home. Called pt's spouse if she can bring med list to [facility]. She verbalized understanding    1930 Bedside and Verbal shift change report given to Patricia Crump RN (oncoming nurse) by Vishal Rao RN (offgoing nurse). Report included the following information SBAR, Kardex, MAR, Recent Results and Cardiac Rhythm .

## 2019-08-20 ENCOUNTER — HOME CARE VISIT (OUTPATIENT)
Dept: HOME HEALTH SERVICES | Facility: HOME HEALTH | Age: 74
End: 2019-08-20
Payer: MEDICARE

## 2019-08-20 LAB
ANION GAP SERPL CALC-SCNC: 11 MMOL/L (ref 3–18)
BASOPHILS # BLD: 0 K/UL (ref 0–0.1)
BASOPHILS NFR BLD: 0 % (ref 0–2)
BUN SERPL-MCNC: 22 MG/DL (ref 7–18)
BUN/CREAT SERPL: 15 (ref 12–20)
CALCIUM SERPL-MCNC: 8.3 MG/DL (ref 8.5–10.1)
CHLORIDE SERPL-SCNC: 97 MMOL/L (ref 100–111)
CO2 SERPL-SCNC: 24 MMOL/L (ref 21–32)
CREAT SERPL-MCNC: 1.44 MG/DL (ref 0.6–1.3)
DIFFERENTIAL METHOD BLD: ABNORMAL
EOSINOPHIL # BLD: 0 K/UL (ref 0–0.4)
EOSINOPHIL NFR BLD: 0 % (ref 0–5)
ERYTHROCYTE [DISTWIDTH] IN BLOOD BY AUTOMATED COUNT: 13.7 % (ref 11.6–14.5)
GLUCOSE SERPL-MCNC: 152 MG/DL (ref 74–99)
HCT VFR BLD AUTO: 32.9 % (ref 36–48)
HGB BLD-MCNC: 11.3 G/DL (ref 13–16)
L PNEUMO AG UR QL IA: NEGATIVE
LYMPHOCYTES # BLD: 0.4 K/UL (ref 0.9–3.6)
LYMPHOCYTES NFR BLD: 4 % (ref 21–52)
MAGNESIUM SERPL-MCNC: 1.9 MG/DL (ref 1.6–2.6)
MCH RBC QN AUTO: 28.5 PG (ref 24–34)
MCHC RBC AUTO-ENTMCNC: 34.3 G/DL (ref 31–37)
MCV RBC AUTO: 83.1 FL (ref 74–97)
MONOCYTES # BLD: 0 K/UL (ref 0.05–1.2)
MONOCYTES NFR BLD: 0 % (ref 3–10)
NEUTS SEG # BLD: 8.5 K/UL (ref 1.8–8)
NEUTS SEG NFR BLD: 96 % (ref 40–73)
PLATELET # BLD AUTO: 246 K/UL (ref 135–420)
PMV BLD AUTO: 8.7 FL (ref 9.2–11.8)
POTASSIUM SERPL-SCNC: 4.4 MMOL/L (ref 3.5–5.5)
RBC # BLD AUTO: 3.96 M/UL (ref 4.7–5.5)
S PNEUM AG UR QL: NEGATIVE
SODIUM SERPL-SCNC: 132 MMOL/L (ref 136–145)
WBC # BLD AUTO: 8.9 K/UL (ref 4.6–13.2)

## 2019-08-20 PROCEDURE — 94640 AIRWAY INHALATION TREATMENT: CPT

## 2019-08-20 PROCEDURE — 94760 N-INVAS EAR/PLS OXIMETRY 1: CPT

## 2019-08-20 PROCEDURE — 87186 SC STD MICRODIL/AGAR DIL: CPT

## 2019-08-20 PROCEDURE — 74011250636 HC RX REV CODE- 250/636: Performed by: HOSPITALIST

## 2019-08-20 PROCEDURE — 87449 NOS EACH ORGANISM AG IA: CPT

## 2019-08-20 PROCEDURE — 85025 COMPLETE CBC W/AUTO DIFF WBC: CPT

## 2019-08-20 PROCEDURE — 36415 COLL VENOUS BLD VENIPUNCTURE: CPT

## 2019-08-20 PROCEDURE — 87077 CULTURE AEROBIC IDENTIFY: CPT

## 2019-08-20 PROCEDURE — 77010033678 HC OXYGEN DAILY

## 2019-08-20 PROCEDURE — 74011250637 HC RX REV CODE- 250/637: Performed by: HOSPITALIST

## 2019-08-20 PROCEDURE — 83735 ASSAY OF MAGNESIUM: CPT

## 2019-08-20 PROCEDURE — 74011000250 HC RX REV CODE- 250: Performed by: HOSPITALIST

## 2019-08-20 PROCEDURE — 65660000000 HC RM CCU STEPDOWN

## 2019-08-20 PROCEDURE — 3331090002 HH PPS REVENUE DEBIT

## 2019-08-20 PROCEDURE — 87450 LEGIONELLA PNEUMOPHILA AG, URINE: CPT

## 2019-08-20 PROCEDURE — 3331090001 HH PPS REVENUE CREDIT

## 2019-08-20 PROCEDURE — 74011000258 HC RX REV CODE- 258: Performed by: EMERGENCY MEDICINE

## 2019-08-20 PROCEDURE — 74011250636 HC RX REV CODE- 250/636: Performed by: EMERGENCY MEDICINE

## 2019-08-20 PROCEDURE — 80048 BASIC METABOLIC PNL TOTAL CA: CPT

## 2019-08-20 PROCEDURE — 87070 CULTURE OTHR SPECIMN AEROBIC: CPT

## 2019-08-20 RX ORDER — IPRATROPIUM BROMIDE AND ALBUTEROL SULFATE 2.5; .5 MG/3ML; MG/3ML
3 SOLUTION RESPIRATORY (INHALATION)
Status: DISCONTINUED | OUTPATIENT
Start: 2019-08-20 | End: 2019-08-23 | Stop reason: HOSPADM

## 2019-08-20 RX ADMIN — PIPERACILLIN SODIUM,TAZOBACTAM SODIUM 3.38 G: 3; .375 INJECTION, POWDER, FOR SOLUTION INTRAVENOUS at 15:25

## 2019-08-20 RX ADMIN — VANCOMYCIN HYDROCHLORIDE 1500 MG: 10 INJECTION, POWDER, LYOPHILIZED, FOR SOLUTION INTRAVENOUS at 00:37

## 2019-08-20 RX ADMIN — Medication 2 SPRAY: at 08:55

## 2019-08-20 RX ADMIN — IPRATROPIUM BROMIDE AND ALBUTEROL SULFATE 3 ML: .5; 3 SOLUTION RESPIRATORY (INHALATION) at 11:44

## 2019-08-20 RX ADMIN — ARFORMOTEROL TARTRATE 15 MCG: 15 SOLUTION RESPIRATORY (INHALATION) at 19:35

## 2019-08-20 RX ADMIN — METHYLPREDNISOLONE SODIUM SUCCINATE 60 MG: 125 INJECTION, POWDER, FOR SOLUTION INTRAMUSCULAR; INTRAVENOUS at 23:46

## 2019-08-20 RX ADMIN — VANCOMYCIN HYDROCHLORIDE 1500 MG: 10 INJECTION, POWDER, LYOPHILIZED, FOR SOLUTION INTRAVENOUS at 23:46

## 2019-08-20 RX ADMIN — IPRATROPIUM BROMIDE AND ALBUTEROL SULFATE 3 ML: .5; 3 SOLUTION RESPIRATORY (INHALATION) at 19:35

## 2019-08-20 RX ADMIN — METHYLPREDNISOLONE SODIUM SUCCINATE 60 MG: 125 INJECTION, POWDER, FOR SOLUTION INTRAMUSCULAR; INTRAVENOUS at 17:56

## 2019-08-20 RX ADMIN — PIPERACILLIN SODIUM,TAZOBACTAM SODIUM 3.38 G: 3; .375 INJECTION, POWDER, FOR SOLUTION INTRAVENOUS at 09:05

## 2019-08-20 RX ADMIN — IPRATROPIUM BROMIDE AND ALBUTEROL SULFATE 3 ML: .5; 3 SOLUTION RESPIRATORY (INHALATION) at 15:30

## 2019-08-20 RX ADMIN — PIPERACILLIN SODIUM,TAZOBACTAM SODIUM 3.38 G: 3; .375 INJECTION, POWDER, FOR SOLUTION INTRAVENOUS at 04:54

## 2019-08-20 RX ADMIN — ARFORMOTEROL TARTRATE 15 MCG: 15 SOLUTION RESPIRATORY (INHALATION) at 07:50

## 2019-08-20 RX ADMIN — BUDESONIDE 500 MCG: 0.5 INHALANT RESPIRATORY (INHALATION) at 07:50

## 2019-08-20 RX ADMIN — BUDESONIDE 500 MCG: 0.5 INHALANT RESPIRATORY (INHALATION) at 19:35

## 2019-08-20 RX ADMIN — PIPERACILLIN SODIUM,TAZOBACTAM SODIUM 3.38 G: 3; .375 INJECTION, POWDER, FOR SOLUTION INTRAVENOUS at 22:40

## 2019-08-20 RX ADMIN — METOPROLOL TARTRATE 25 MG: 25 TABLET, FILM COATED ORAL at 20:13

## 2019-08-20 RX ADMIN — ACETAMINOPHEN 650 MG: 325 TABLET ORAL at 09:23

## 2019-08-20 RX ADMIN — AZITHROMYCIN MONOHYDRATE 500 MG: 500 INJECTION, POWDER, LYOPHILIZED, FOR SOLUTION INTRAVENOUS at 12:42

## 2019-08-20 RX ADMIN — METOPROLOL TARTRATE 25 MG: 25 TABLET, FILM COATED ORAL at 08:55

## 2019-08-20 RX ADMIN — IPRATROPIUM BROMIDE AND ALBUTEROL SULFATE 3 ML: .5; 3 SOLUTION RESPIRATORY (INHALATION) at 23:15

## 2019-08-20 RX ADMIN — APIXABAN 5 MG: 5 TABLET, FILM COATED ORAL at 08:55

## 2019-08-20 RX ADMIN — APIXABAN 5 MG: 5 TABLET, FILM COATED ORAL at 20:13

## 2019-08-20 RX ADMIN — ACETAMINOPHEN 650 MG: 325 TABLET ORAL at 15:25

## 2019-08-20 RX ADMIN — METHYLPREDNISOLONE SODIUM SUCCINATE 60 MG: 125 INJECTION, POWDER, FOR SOLUTION INTRAMUSCULAR; INTRAVENOUS at 12:43

## 2019-08-20 RX ADMIN — METHYLPREDNISOLONE SODIUM SUCCINATE 60 MG: 125 INJECTION, POWDER, FOR SOLUTION INTRAMUSCULAR; INTRAVENOUS at 04:52

## 2019-08-20 NOTE — PROGRESS NOTES
Shift progress Note:  Assumed care of patient in bed alert & oriented no s/s of acute distress remains on 2 lpm by nasal cannula. Uneventful night, call bell within reach.   Patient Vitals for the past 12 hrs:   Temp Pulse Resp BP SpO2   08/20/19 0324 97.8 °F (36.6 °C) 85 24 145/74 99 %   08/19/19 2340 97.3 °F (36.3 °C) 86 24 156/82 97 %   08/19/19 2000 97.3 °F (36.3 °C) 89 20 153/79 100 %

## 2019-08-20 NOTE — PROGRESS NOTES
Called to bedside for stat PRN duo neb. Patient sitting on side of bed with labored breathing and significant wheezing. Duo neb given breath sounds improving. Resp rate from 28 to 22.  SPO2 96% on 2 liters

## 2019-08-20 NOTE — PROGRESS NOTES
8/20/2019 PT note: consult received and chart reviewed. Spoke with OTR and noted pt on hold today due to desating at rest.  Will f/u tomorrow as appropriate.    Kan Chamberlain, PT

## 2019-08-20 NOTE — PROGRESS NOTES
Reason for Admission:   Additional History (Context):   2:52 PM  Jerad Mccallum is a 66 y.o. male with PMHX of COPD who presents to the emergency department C/O SOB. Associated sxs include wheezing. Pt denies chest pain, and any other sxs or complaints. Patient has had progressive cough over the last week and a half not improved with home nebulizers. He is not on steroids. He has a history of COPD and prior intubation. At today's shortness of breath got worse to call 911. In the field the patient was tripoding necessitating CPAP. On arrival the patient is in respiratory distress.     PCP: Js Taylor MD                RRAT Score:                  Resources/supports as identified by patient/family:   Lives with wife                Top Challenges facing patient (as identified by patient/family and CM): Finances/Medication cost?                    Transportation? Drives self              Support system or lack thereof? Living arrangements? Lives with wife           Self-care/ADLs/Cognition? IADL's          Current Advanced Directive/Advance Care Plan: On file                          Plan for utilizing home health:    States he has [facility] but does not recall the name of the agency                 Transition of Care Plan:    Met with patient at bedside. Patient states he lives with his wife reports that he has a nebulizer and home oxygen. Patient reports he has no needs at this time. Reports he has medicare and [employer] for insurance. His pcp is Dr. Vernon Maldonado. He wants to return home whe medically cleared reports he has [facility] but does not know the name of the agency. Cm will continue to follow  Care Management Interventions  PCP Verified by CM:  Yes  Transition of Care Consult (CM Consult): Home Health(patient reports he has [facility] but does not know the name)  Current Support Network: Lives with Spouse  Confirm Follow Up Transport: Family  Plan discussed with Pt/Family/Caregiver: Yes  Freedom of Choice Offered: Yes  Discharge Location  Discharge Placement: Home with home health

## 2019-08-20 NOTE — PROGRESS NOTES
5563 Assumed care of the patient from [facility] (offgoing Nurse). Patient is alert and oriented. Pt denies any pain or discomfort at this moment. bed in low position, call bell within reach. 0800 Shift assessment completed and documented on flowsheet. 1200 Reassessment done with no changes noted. 1400 Pt's spouse at bedside. PTA med list updated. 1600 Reassessment done with no changes noted. 1920 Bedside and Verbal shift change report given to Ruthann Mohan RN (oncoming nurse) by Eldon Mccann RN (offgoing nurse).  Report included the following information SBAR, Kardex, [facility], Recent Results and Cardiac Rhythm .     '

## 2019-08-20 NOTE — PROGRESS NOTES
Vancomycin - Pharmacy to Dose    Consult provided for this 66y.o. year old ,male for indication of Pneumonia ( CAP ) - 7 day tx. Therapy day 1        Wt Readings from Last 1 Encounters:   08/19/19 88.9 kg (196 lb)       Ht Readings from Last 1 Encounters:   08/19/19 172.7 cm (68\")     Dosing Weight:  88.9 kg    Additional Antibiotics:    Date:  8/19/19   Lab Results   Component Value Date/Time    Creatinine 1.53 (H) 08/19/2019 02:45 PM     creatinine clearance:  44.5 ml/min    white blood cell count:  13.9     Will dose Vancomycin 1500 mg IV q24hrs. Trough after 3-4 doses infused. Dose calculated to approximate a therapeutic trough of 15-20 mcg/mL. Pharmacy to follow daily and will make changes to dose and/or frequency based on clinical status.       2545 . [facility], [address] [place] [address]

## 2019-08-20 NOTE — PROGRESS NOTES
Attempt for OT eval at 1125. Per RT , pt desating at rest and that Dr. Natalie Hutson wants to hold therapy for today. To be followed tomorrow. Thank you.   Otf Grover, OTR/L

## 2019-08-20 NOTE — CONSULTS
Pulmonary and Sleep Medicine     Pulmonary Consult Note    Patient: Eliz Gomez               Sex: male          DOA: 8/19/2019       YOB: 1945      Age:  66 y.o.        LOS:  LOS: 1 day        Reason for Consult: COPD exacerbation    IMPRESSION:   Patient Active Problem List   Diagnosis Code    COPD (chronic obstructive pulmonary disease) exacerbation ([facility]) J44.1    Pneumonia J18.9    Tobacco abuse Z72.0    Chronic renal disease, stage 3, moderately decreased glomerular filtration rate between 30-59 mL/min/1.73 square meter ([facility]) N18.3    Asbestosis ([address] J61    Wheezing     Hypertension I10    Debility R53.81    Paroxysmal atrial fibrillation ([facility]) I48.0    Chronic respiratory failure with hypoxia ([facility]) J96.11      RECOMMENDATIONS:   Continue supplemental O2 via NC, titrate flow for goal SPO2> 90%  XR chest PA and lat in AM  Continue bronchodilators, pulmonary hygiene care  Steroids  Antibiotic choice: Zosyn, Azithromycin, Vancomycin  Aspiration prevention bundle, head of the bed at 30' all times  Glycemic control  Stress ulcer prophylaxis  DVT prophylaxis  AM labs  Will defer respective systems problem management to primary and other consultant and follow patient with primary and other team  Further recommendations will be based on the patient's response to recommended treatment and results of the investigation ordered. HPI:   Mr. Eliz Gomez is a 66 y.o. male who is known to my associate Dr. Dayton Spring and follows for COPD, presented to ER with progressive worsening of C/O SOB associated with wheezing, cough. Patient last month was admitted in hospital and required ventilator support. Denies chest pain, fever, chills, sick contacts. Patient felt not improved with home nebulizers. In the field the patient required BiPAP but in ER came off of BiPAP. He is admitted for COPD exacerbations.  This AM developed an episode of respiratory distress and required nebulized bronchodilator. Feels improved since then at the time of this evaluation.     Review of Systems  General ROS: negative for  - fever, chills, weight loss, fatigue and malaise  Psychological ROS: negative for - anxiety or depression  Ophthalmic ROS: negative for - eye pain, loss of vision or photophobia  ENT ROS: negative for - epistaxis, headaches, sinus pain or vocal changes  Allergy and Immunology ROS: negative for - hives or postnasal drip  Hematological and Lymphatic ROS: negative for - bleeding problems, blood clots, jaundice, pallor or swollen lymph nodes  Endocrine ROS: negative for - skin changes, temperature intolerance or unexpected weight changes  Cardiovascular ROS: negative for - chest pain, edema, murmur, orthopnea, palpitations or paroxysmal nocturnal dyspnea  Gastrointestinal ROS: no abdominal pain, change in bowel habits, or black or bloody stools  Genito-Urinary ROS: no dysuria, trouble voiding, or hematuria  Musculoskeletal ROS: negative for - muscle pain or muscular weakness  Neurological ROS: no TIA or stroke symptoms  Dermatological ROS: negative for - pruritus, rash or skin lesion changes     Past Medical History  Past Medical History:   Diagnosis Date    Cancer ([address]     prostate    COPD [facility]chronic obstructive pulmonary disease) ([address]     Hypertension     Pneumonia     Radiation effect        Past Surgical History  Past Surgical History:   Procedure Laterality Date    HX HERNIA REPAIR         Family History  Family History   Problem Relation Age of Onset    Heart Disease Mother        Social History  Social History     Tobacco Use    Smoking status: Former Smoker     Types: Cigarettes    Smokeless tobacco: Never Used   Substance Use Topics    Alcohol use: No    Drug use: Not on file        Medications  Current Facility-Administered Medications   Medication Dose Route Frequency    albuterol-ipratropium (DUO-NEB) 2.5 MG-0.5 MG/3 ML  3 mL Nebulization Q4H RT    azithromycin (ZITHROMAX) 500 mg in 0.9% sodium chloride 250 mL (VIAL-MATE)  500 mg IntraVENous Q24H    piperacillin-tazobactam (ZOSYN) 3.375 g in 0.9% sodium chloride (MBP/ADV) 100 mL MBP  3.375 g IntraVENous Q6H    sodium chloride (OCEAN) 0.65 % nasal squeeze bottle 2 Spray  2 Spray Both Nostrils Q2H PRN    methylPREDNISolone (PF) (Solu-MEDROL) injection 60 mg  60 mg IntraVENous Q6H    budesonide (PULMICORT) 500 mcg/2 ml nebulizer suspension  500 mcg Nebulization BID RT    arformoterol (BROVANA) neb solution 15 mcg  15 mcg Nebulization BID RT    apixaban (ELIQUIS) tablet 5 mg  5 mg Oral BID    metoprolol tartrate (LOPRESSOR) tablet 25 mg  25 mg Oral Q12H    Vancomycin - Pharmacokinetic Dosing  1 Each Other Rx Dosing/Monitoring    vancomycin (VANCOCIN) 1500 mg in  ml infusion  1,500 mg IntraVENous Q24H    acetaminophen (TYLENOL) tablet 650 mg  650 mg Oral Q4H PRN    naloxone (NARCAN) injection 0.4 mg  0.4 mg IntraVENous PRN    diphenhydrAMINE (BENADRYL) injection 12.5 mg  12.5 mg IntraVENous Q4H PRN    ondansetron (ZOFRAN) injection 4 mg  4 mg IntraVENous Q4H PRN     Prior to Admission medications    Medication Sig Start Date End Date Taking? Authorizing Provider   methylPREDNISolone (MEDROL) 4 mg tab Take 4 mg by mouth Follow dosing instructions. Provider, Historical   diphenhydrAMINE (BENADRYL) 25 mg capsule Take 25 mg by mouth nightly as needed for Itching. Provider, Historical   OXYGEN-AIR DELIVERY SYSTEMS 2 L by Nasal route continuous. Provider, Historical   fluticasone propionate (FLONASE) 50 mcg/actuation nasal spray 2 Sprays by Both Nostrils route daily. Provider, Historical   umeclidinium-vilanterol (ANORO ELLIPTA) 62.5-25 mcg/actuation inhaler Take 1 Puff by inhalation daily. 7/12/19   Deborah Shepard MD   predniSONE (DELTASONE) 20 mg tablet 40mg po daily x 5d. 7/12/19   Deborah Shepard MD   levoFLOXacin (LEVAQUIN) 500 mg tablet Take 1 Tab by mouth daily.  7/12/19   Deborah Shepard MD apixaban (ELIQUIS) 5 mg tablet Take 1 Tab by mouth two (2) times a day. 19   Becca Chaves MD   metoprolol tartrate (LOPRESSOR) 25 mg tablet Take 1 Tab by mouth every twelve (12) hours. 19   Becca Chaves MD   albuterol-ipratropium (DUO-NEB) 2.5 mg-0.5 mg/3 ml nebu 3 mL by Nebulization route every four (4) hours as needed. Patient taking differently: 3 mL by Nebulization route every four (4) hours as needed for Other (Shortness of breath). 19   Laylaann Bell DO   albuterol (PROVENTIL HFA, VENTOLIN HFA, PROAIR HFA) 90 mcg/actuation inhaler Take 2 Puffs by inhalation every four (4) hours as needed for Wheezing or Shortness of Breath. 18   Linda Malone PA-C   ipratropium (ATROVENT) 0.03 % nasal spray 2 Sprays every twelve (12) hours. Other, MD Blayne   tamsulosin (FLOMAX) 0.4 mg capsule Take 0.4 mg by mouth daily. Other, MD Blayne   chlorthalidone (HYGROTEN) 25 mg tablet Take  by mouth daily. Other, MD Blayne       Allergy  Allergies   Allergen Reactions    Aspirin Other (comments)     \"messes my stomach up\"       Physical Exam:   Vital Signs:    Blood pressure 140/65, pulse 90, temperature 98.7 °F (37.1 °C), resp. rate 20, height 5' 8\" (1.727 m), weight 90.6 kg (199 lb 11.8 oz), SpO2 95 %. Body mass index is 30.37 kg/m².     O2 Device: Nasal cannula   O2 Flow Rate (L/min): 2 l/min   Temp (24hrs), Av.9 °F (36.6 °C), Min:97.3 °F (36.3 °C), Max:98.7 °F (37.1 °C)       Intake/Output:   Last shift:       07 - 1900  In: -   Out: 675 [Urine:675]  Last 3 shifts: 1901 -  0700  In: 360 [P.O.:360]  Out: 1500 [Urine:1500]    Intake/Output Summary (Last 24 hours) at 2019 1709  Last data filed at 2019 4615  Gross per 24 hour   Intake 360 ml   Output 2175 ml   Net -1815 ml      General: in no respiratory distress and acyanotic, cooperative, appears stated age, on O2 via NC  HEENT: PERRLA, EOMI, throat normal without erythema or exudate  Neck: No abnormally enlarged lymph nodes or thyroid, supple  Chest: increased AP diameter  Lungs: decreased air exchange bilaterally and scattered wheezes bilaterally, normal percussion bilaterally, no tenderness/ rash  Heart: Regular rate and rhythm, S1S2 present or without murmur or extra heart sounds  Abdomen: non distended, bowel sounds normoactive, tympanic, abdomen is soft without significant tenderness, masses, organomegaly or guarding  Extremity: negative for edema, cyanosis, clubbing  Neuro: alert, oriented x 3, no defects noted in general exam.  Skin: Skin color, texture, turgor normal. No rashes or lesions    Labs Reviewed:  Recent Results (from the past 24 hour(s))   METABOLIC PANEL, BASIC    Collection Time: 08/20/19  4:34 AM   Result Value Ref Range    Sodium 132 (L) 136 - 145 mmol/L    Potassium 4.4 3.5 - 5.5 mmol/L    Chloride 97 (L) 100 - 111 mmol/L    CO2 24 21 - 32 mmol/L    Anion gap 11 3.0 - 18 mmol/L    Glucose 152 (H) 74 - 99 mg/dL    BUN 22 (H) 7.0 - 18 MG/DL    Creatinine 1.44 (H) 0.6 - 1.3 MG/DL    BUN/Creatinine ratio 15 12 - 20      GFR est AA 58 (L) >60 ml/min/1.73m2    GFR est non-AA 48 (L) >60 ml/min/1.73m2    Calcium 8.3 (L) 8.5 - 10.1 MG/DL   MAGNESIUM    Collection Time: 08/20/19  4:34 AM   Result Value Ref Range    Magnesium 1.9 1.6 - 2.6 mg/dL   CBC WITH AUTOMATED DIFF    Collection Time: 08/20/19  4:34 AM   Result Value Ref Range    WBC 8.9 4.6 - 13.2 K/uL    RBC 3.96 (L) 4.70 - 5.50 M/uL    HGB 11.3 (L) 13.0 - 16.0 g/dL    HCT 32.9 (L) 36.0 - 48.0 %    MCV 83.1 74.0 - 97.0 FL    MCH 28.5 24.0 - 34.0 PG    MCHC 34.3 31.0 - 37.0 g/dL    RDW 13.7 11.6 - 14.5 %    PLATELET 407 733 - 002 K/uL    MPV 8.7 (L) 9.2 - 11.8 FL    NEUTROPHILS 96 (H) 40 - 73 %    LYMPHOCYTES 4 (L) 21 - 52 %    MONOCYTES 0 (L) 3 - 10 %    EOSINOPHILS 0 0 - 5 %    BASOPHILS 0 0 - 2 %    ABS. NEUTROPHILS 8.5 (H) 1.8 - 8.0 K/UL    ABS. LYMPHOCYTES 0.4 (L) 0.9 - 3.6 K/UL    ABS. MONOCYTES 0.0 (L) 0.05 - 1.2 K/UL    ABS. EOSINOPHILS 0.0 0.0 - 0.4 K/UL    ABS. BASOPHILS 0.0 0.0 - 0.1 K/UL    DF AUTOMATED             Recent Labs     08/19/19  1525   FIO2I 35   HCO3I 22.8   PCO2I 37.2   PHI 7.396   PO2I 151*       All Micro Results     Procedure Component Value Units Date/Time    Dileep Bah, URINE [[id number]] Collected:  08/20/19 1300    Order Status:  Completed Specimen:  Urine Updated:  08/20/19 1324    LEGIONELLA PNEUMOPHILA AG, URINE [[id number]] Collected:  08/20/19 1300    Order Status:  Completed Specimen:  Urine Updated:  08/20/19 1323    CULTURE, RESPIRATORY/SPUTUM/BRONCH Ricka Gaster STAIN [[id number]] Collected:  08/20/19 1300    Order Status:  Completed Specimen:  Sputum Updated:  08/20/19 1322    CULTURE, BLOOD [[id number]] Collected:  08/19/19 1535    Order Status:  Completed Specimen:  Blood Updated:  08/20/19 0555     Special Requests: NO SPECIAL REQUESTS        Culture result: NO GROWTH AFTER 14 HOURS       CULTURE, BLOOD [[id number]] Collected:  08/19/19 1600    Order Status:  Completed Specimen:  Blood Updated:  08/20/19 0555     Special Requests: NO SPECIAL REQUESTS        Culture result: NO GROWTH AFTER 13 HOURS               Imaging:  [x]I have personally reviewed the patients chest radiographs images and report with the patient  Results from Hospital Encounter encounter on 08/19/19   XR CHEST PORT    Narrative EXAM: XR CHEST PORT    CLINICAL INDICATION/HISTORY: Respiratory distress  -Additional: Chronic obstructive pulmonary disease    COMPARISON: Several prior exams, most recently chest radiograph 7/12/2019 as  well as prior CT scan of the chest 7/7/2019    TECHNIQUE: Frontal view of the chest    _______________    FINDINGS:    HEART AND MEDIASTINUM: Normal appearing cardiac size and mediastinal contours. LUNGS AND PLEURAL SPACES: Asymmetric opacity the right lung base is noted. Pleural-based calcification involving the anterior right hemithorax as noted on  multiple prior exams. No pneumothorax.  No definite pleural effusion. BONY THORAX AND SOFT TISSUES: No acute osseous abnormality    _______________      Impression IMPRESSION:      1. Mild asymmetric opacity at the right mid and lower lung base, potentially  atelectasis or airspace disease. 2. Elliptical partially calcified right pleural based lesion unchanged. [x]See my orders for details    My assessment, plan of care, findings, medications, side effects etc were discussed with:  [x]nursing []PT/OT    [x]respiratory therapy [x]Dr. Cata Matson [x]Patient     [x]Total care time exclusive of procedures 60 minutes with complex decision making performed and > 50% time spent in face to face consultation.     Gwen Mendoza MD

## 2019-08-20 NOTE — PROGRESS NOTES
Hospitalist Progress Note-critical care note     Patient: Price Lambert MRN: [id number]  CSN: [id number]    YOB: 1945  Age: 66 y.o. Sex: male    DOA: 8/19/2019 LOS:  LOS: 1 day            Chief complaint: copd exacerbation, pna     Assessment/Plan         Hospital Problems  Date Reviewed: 7/6/2019          Codes Class Noted POA    RLL pneumonia ([facility] ICD-10-CM: J18.1  ICD-9-CM: 486  8/19/2019 Unknown        Paroxysmal atrial fibrillation ([facility] ICD-10-CM: I48.0  ICD-9-CM: 427.31  8/19/2019 Unknown        Chronic respiratory failure with hypoxia ([facility] ICD-10-CM: J96.11  ICD-9-CM: 518.83, 799.02  8/19/2019 Unknown        Elevated creatine kinase ICD-10-CM: R74.8  ICD-9-CM: 790.5  8/19/2019 Unknown        * (Principal) COPD with acute exacerbation ([facility] ICD-10-CM: J44.1  ICD-9-CM: 491.21  7/6/2019 Yes        COPD (chronic obstructive pulmonary disease) ([facility] ICD-10-CM: J44.9  ICD-9-CM: 496  10/2/2014 Unknown        Hypertension ICD-10-CM: I10  ICD-9-CM: 401.9  Unknown Yes            Copd exacerbation   Wheezing am will schedule duoneb   Iv steroid, breathing treatment with bronchodilator   symptom treatment   Will start empiric iv abx   ssi for glucose control   Educate pt   Case discussed with Dr. Yvrose Palma      pna -rt side   Continue vanc and zosyn, aspiration precaution add azithromycin, will f/u with the cx   Sputum cx, strep pneumo, legionella      paf   Continue eliquis and metoprolol      Chronic respiratory failure   With home o2 nc O2       Elevated cr  Stable       HTN, accelerated  Continue home medication.     Hyponatremia   Stable     Subjective: I can not breathing   Stat breathing tx give, pt felt better, he wants to try to be resuscitated now. Case discussed with Mitch parr      35 total min's spent on patient care including >50% on counseling/coordinating care. Discussed the above assessments.  also discussed labs, medications and hospital course    Disposition :tbd,   Review of systems:    General: No fevers or chills. Cardiovascular: No chest pain or pressure. No palpitations. Pulmonary: shortness of breath   Gastrointestinal: No nausea, vomiting. Vital signs/Intake and Output:  Visit Vitals  /65   Pulse 90   Temp 98.7 °F (37.1 °C)   Resp 20   Ht 5' 8\" (1.727 m)   Wt 90.6 kg (199 lb 11.8 oz)   SpO2 95%   BMI 30.37 kg/m²     Current Shift:  08/20 0701 - 08/20 1900  In: -   Out: 794 [Urine:675]  Last three shifts:  08/18 1901 - 08/20 0700  In: 360 [P.O.:360]  Out: 1500 [Urine:1500]    Physical Exam:  General: WD, WN. Alert, cooperative, in mild  distress    HEENT: NC, Atraumatic. PERRLA, anicteric sclerae. Lungs: Bilateral wheezing   Heart:  Regular  rhythm,  No murmur, No Rubs, No Gallops  Abdomen: Soft, Non distended, Non tender.  +Bowel sounds,   Extremities: No c/c/e  Psych:   + anxious, no agitated. Neurologic:  No acute neurological deficit. Labs: Results:       Chemistry Recent Labs     08/20/19  0434 08/19/19  1445   * 118*   * 132*   K 4.4 4.1   CL 97* 98*   CO2 24 24   BUN 22* 18   CREA 1.44* 1.53*   CA 8.3* 8.7   AGAP 11 10   BUCR 15 12   AP  --  77   TP  --  6.1*   ALB  --  3.2*   GLOB  --  2.9   AGRAT  --  1.1      CBC w/Diff Recent Labs     08/20/19  0434 08/19/19  1445   WBC 8.9 13.9*   RBC 3.96* 4.00*   HGB 11.3* 11.4*   HCT 32.9* 33.6*    270   GRANS 96* 75   LYMPH 4* 6*   EOS 0 9*      Cardiac Enzymes Recent Labs     08/19/19  1445      CKND1 5.0*      Coagulation No results for input(s): PTP, INR, APTT in the last 72 hours. No lab exists for component: INREXT    Lipid Panel No results found for: CHOL, CHOLPOCT, CHOLX, CHLST, CHOLV, 356769, HDL, LDL, LDLC, DLDLP, 363915, VLDLC, VLDL, TGLX, TRIGL, TRIGP, TGLPOCT, CHHD, CHHDX   BNP No results for input(s): BNPP in the last 72 hours.    Liver Enzymes Recent Labs     08/19/19  1445   TP 6.1*   ALB 3.2*   AP 77   SGOT 12      Thyroid Studies No results found for: T4, T3U, TSH, TSHEXT     Procedures/imaging: see electronic medical records for all procedures/Xrays and details which were not copied into this note but were reviewed prior to creation of Claudean Canning, MD

## 2019-08-20 NOTE — ROUTINE PROCESS
Bedside and Verbal shift change report given to [address] (oncoming nurse) by Marlon Garcia RN   (offgoing nurse). Report included the following information SBAR, Kardex, Intake/Output, MAR and Recent Results.

## 2019-08-21 ENCOUNTER — APPOINTMENT (OUTPATIENT)
Dept: GENERAL RADIOLOGY | Age: 74
DRG: 190 | End: 2019-08-21
Attending: INTERNAL MEDICINE
Payer: MEDICARE

## 2019-08-21 LAB
ANION GAP SERPL CALC-SCNC: 11 MMOL/L (ref 3–18)
BASOPHILS # BLD: 0 K/UL (ref 0–0.1)
BASOPHILS NFR BLD: 0 % (ref 0–2)
BUN SERPL-MCNC: 27 MG/DL (ref 7–18)
BUN/CREAT SERPL: 20 (ref 12–20)
CALCIUM SERPL-MCNC: 8.4 MG/DL (ref 8.5–10.1)
CHLORIDE SERPL-SCNC: 102 MMOL/L (ref 100–111)
CO2 SERPL-SCNC: 26 MMOL/L (ref 21–32)
CREAT SERPL-MCNC: 1.33 MG/DL (ref 0.6–1.3)
DIFFERENTIAL METHOD BLD: ABNORMAL
EOSINOPHIL # BLD: 0 K/UL (ref 0–0.4)
EOSINOPHIL NFR BLD: 0 % (ref 0–5)
ERYTHROCYTE [DISTWIDTH] IN BLOOD BY AUTOMATED COUNT: 13.8 % (ref 11.6–14.5)
GLUCOSE SERPL-MCNC: 139 MG/DL (ref 74–99)
HCT VFR BLD AUTO: 33.4 % (ref 36–48)
HGB BLD-MCNC: 11.1 G/DL (ref 13–16)
LYMPHOCYTES # BLD: 0.5 K/UL (ref 0.9–3.6)
LYMPHOCYTES NFR BLD: 4 % (ref 21–52)
MAGNESIUM SERPL-MCNC: 2.2 MG/DL (ref 1.6–2.6)
MCH RBC QN AUTO: 28.1 PG (ref 24–34)
MCHC RBC AUTO-ENTMCNC: 33.2 G/DL (ref 31–37)
MCV RBC AUTO: 84.6 FL (ref 74–97)
MONOCYTES # BLD: 0.5 K/UL (ref 0.05–1.2)
MONOCYTES NFR BLD: 4 % (ref 3–10)
NEUTS SEG # BLD: 11.9 K/UL (ref 1.8–8)
NEUTS SEG NFR BLD: 92 % (ref 40–73)
PLATELET # BLD AUTO: 248 K/UL (ref 135–420)
PMV BLD AUTO: 8.7 FL (ref 9.2–11.8)
POTASSIUM SERPL-SCNC: 3.5 MMOL/L (ref 3.5–5.5)
RBC # BLD AUTO: 3.95 M/UL (ref 4.7–5.5)
SODIUM SERPL-SCNC: 139 MMOL/L (ref 136–145)
WBC # BLD AUTO: 12.9 K/UL (ref 4.6–13.2)

## 2019-08-21 PROCEDURE — 36415 COLL VENOUS BLD VENIPUNCTURE: CPT

## 2019-08-21 PROCEDURE — 94760 N-INVAS EAR/PLS OXIMETRY 1: CPT

## 2019-08-21 PROCEDURE — 3331090001 HH PPS REVENUE CREDIT

## 2019-08-21 PROCEDURE — 77010033678 HC OXYGEN DAILY

## 2019-08-21 PROCEDURE — 97167 OT EVAL HIGH COMPLEX 60 MIN: CPT

## 2019-08-21 PROCEDURE — 74011250636 HC RX REV CODE- 250/636: Performed by: EMERGENCY MEDICINE

## 2019-08-21 PROCEDURE — 74011250636 HC RX REV CODE- 250/636: Performed by: HOSPITALIST

## 2019-08-21 PROCEDURE — 83735 ASSAY OF MAGNESIUM: CPT

## 2019-08-21 PROCEDURE — 85025 COMPLETE CBC W/AUTO DIFF WBC: CPT

## 2019-08-21 PROCEDURE — 3331090002 HH PPS REVENUE DEBIT

## 2019-08-21 PROCEDURE — 71046 X-RAY EXAM CHEST 2 VIEWS: CPT

## 2019-08-21 PROCEDURE — 94640 AIRWAY INHALATION TREATMENT: CPT

## 2019-08-21 PROCEDURE — 74011000250 HC RX REV CODE- 250: Performed by: HOSPITALIST

## 2019-08-21 PROCEDURE — 74011250637 HC RX REV CODE- 250/637: Performed by: HOSPITALIST

## 2019-08-21 PROCEDURE — 74011000258 HC RX REV CODE- 258: Performed by: EMERGENCY MEDICINE

## 2019-08-21 PROCEDURE — 65660000000 HC RM CCU STEPDOWN

## 2019-08-21 PROCEDURE — 80048 BASIC METABOLIC PNL TOTAL CA: CPT

## 2019-08-21 PROCEDURE — 97161 PT EVAL LOW COMPLEX 20 MIN: CPT

## 2019-08-21 RX ADMIN — IPRATROPIUM BROMIDE AND ALBUTEROL SULFATE 3 ML: .5; 3 SOLUTION RESPIRATORY (INHALATION) at 19:59

## 2019-08-21 RX ADMIN — METHYLPREDNISOLONE SODIUM SUCCINATE 60 MG: 125 INJECTION, POWDER, FOR SOLUTION INTRAMUSCULAR; INTRAVENOUS at 05:11

## 2019-08-21 RX ADMIN — METOPROLOL TARTRATE 25 MG: 25 TABLET, FILM COATED ORAL at 20:49

## 2019-08-21 RX ADMIN — PIPERACILLIN SODIUM,TAZOBACTAM SODIUM 3.38 G: 3; .375 INJECTION, POWDER, FOR SOLUTION INTRAVENOUS at 05:07

## 2019-08-21 RX ADMIN — IPRATROPIUM BROMIDE AND ALBUTEROL SULFATE 3 ML: .5; 3 SOLUTION RESPIRATORY (INHALATION) at 11:24

## 2019-08-21 RX ADMIN — PIPERACILLIN SODIUM,TAZOBACTAM SODIUM 3.38 G: 3; .375 INJECTION, POWDER, FOR SOLUTION INTRAVENOUS at 16:52

## 2019-08-21 RX ADMIN — ARFORMOTEROL TARTRATE 15 MCG: 15 SOLUTION RESPIRATORY (INHALATION) at 19:59

## 2019-08-21 RX ADMIN — BUDESONIDE 500 MCG: 0.5 INHALANT RESPIRATORY (INHALATION) at 07:04

## 2019-08-21 RX ADMIN — APIXABAN 5 MG: 5 TABLET, FILM COATED ORAL at 09:07

## 2019-08-21 RX ADMIN — IPRATROPIUM BROMIDE AND ALBUTEROL SULFATE 3 ML: .5; 3 SOLUTION RESPIRATORY (INHALATION) at 07:04

## 2019-08-21 RX ADMIN — METOPROLOL TARTRATE 25 MG: 25 TABLET, FILM COATED ORAL at 09:08

## 2019-08-21 RX ADMIN — BUDESONIDE 500 MCG: 0.5 INHALANT RESPIRATORY (INHALATION) at 19:59

## 2019-08-21 RX ADMIN — AZITHROMYCIN MONOHYDRATE 500 MG: 500 INJECTION, POWDER, LYOPHILIZED, FOR SOLUTION INTRAVENOUS at 12:10

## 2019-08-21 RX ADMIN — METHYLPREDNISOLONE SODIUM SUCCINATE 60 MG: 125 INJECTION, POWDER, FOR SOLUTION INTRAMUSCULAR; INTRAVENOUS at 17:30

## 2019-08-21 RX ADMIN — PIPERACILLIN SODIUM,TAZOBACTAM SODIUM 3.38 G: 3; .375 INJECTION, POWDER, FOR SOLUTION INTRAVENOUS at 10:04

## 2019-08-21 RX ADMIN — IPRATROPIUM BROMIDE AND ALBUTEROL SULFATE 3 ML: .5; 3 SOLUTION RESPIRATORY (INHALATION) at 04:30

## 2019-08-21 RX ADMIN — VANCOMYCIN HYDROCHLORIDE 1500 MG: 10 INJECTION, POWDER, LYOPHILIZED, FOR SOLUTION INTRAVENOUS at 23:36

## 2019-08-21 RX ADMIN — PIPERACILLIN SODIUM,TAZOBACTAM SODIUM 3.38 G: 3; .375 INJECTION, POWDER, FOR SOLUTION INTRAVENOUS at 21:13

## 2019-08-21 RX ADMIN — ARFORMOTEROL TARTRATE 15 MCG: 15 SOLUTION RESPIRATORY (INHALATION) at 07:04

## 2019-08-21 RX ADMIN — IPRATROPIUM BROMIDE AND ALBUTEROL SULFATE 3 ML: .5; 3 SOLUTION RESPIRATORY (INHALATION) at 15:31

## 2019-08-21 RX ADMIN — METHYLPREDNISOLONE SODIUM SUCCINATE 60 MG: 125 INJECTION, POWDER, FOR SOLUTION INTRAMUSCULAR; INTRAVENOUS at 12:10

## 2019-08-21 RX ADMIN — APIXABAN 5 MG: 5 TABLET, FILM COATED ORAL at 20:48

## 2019-08-21 NOTE — ROUTINE PROCESS
1917 Bedside verbal shift change report received from Laura Mendez RN. Pt lying in bed watching tv. Denies pain and nad noted.     7978 Report given to [place], [facility]

## 2019-08-21 NOTE — PROGRESS NOTES
0730 Report received from Segun. Marlon Barlow 44  Assessment complete  0129 Patient transported to xray via wheelchair, O2 at 3 lpm  1045 PT working with patient

## 2019-08-21 NOTE — PROGRESS NOTES
Hospitalist Progress Note-critical care note     Patient: Sebastian Obrien MRN: [id number]  CSN: [id number]    YOB: 1945  Age: 66 y.o. Sex: male    DOA: 8/19/2019 LOS:  LOS: 2 days            Chief complaint: copd exacerbation, pna     Assessment/Plan         Hospital Problems  Date Reviewed: 7/6/2019          Codes Class Noted POA    RLL pneumonia ([facility] ICD-10-CM: J18.1  ICD-9-CM: 486  8/19/2019 Unknown        Paroxysmal atrial fibrillation ([facility] ICD-10-CM: I48.0  ICD-9-CM: 427.31  8/19/2019 Unknown        Chronic respiratory failure with hypoxia [facility]) ICD-10-CM: J96.11  ICD-9-CM: 518.83, 799.02  8/19/2019 Unknown        Elevated creatine kinase ICD-10-CM: R74.8  ICD-9-CM: 790.5  8/19/2019 Unknown        * (Principal) COPD with acute exacerbation [facility]) ICD-10-CM: J44.1  ICD-9-CM: 491.21  7/6/2019 Yes        COPD (chronic obstructive pulmonary disease) ([place] [address] ICD-10-CM: J44.9  ICD-9-CM: 496  10/2/2014 Unknown        Hypertension ICD-10-CM: I10  ICD-9-CM: 401.9  Unknown Yes            Copd exacerbation   No wheezing today, feel better   Iv steroid-weaning tomorrow   , breathing treatment with bronchodilator   symptom treatment   Continue iv abx   ssi for glucose control      pna -rt side   Continue vanc and zosyn, aspiration precaution add azithromycin,   So far Sputum cx pending, strep pneumo, legionella -negative      paf   Continue eliquis and metoprolol      Chronic respiratory failure   With home o2 nc O2       Elevated cr  Stable       HTN, accelerated  Continue home medication.     Hyponatremia   Stable     Subjective: feel better today     Disposition :1-2 days   Review of systems:    General: No fevers or chills. Cardiovascular: No chest pain or pressure. No palpitations. Pulmonary: shortness of breath better   Gastrointestinal: No nausea, vomiting.      Vital signs/Intake and Output:  Visit Vitals  /71   Pulse 73   Temp 98.1 °F (36.7 °C)   Resp 18   Ht 5' 8\" (1.727 m)   Wt 89 kg (196 lb 3.2 oz)   SpO2 99%   BMI 29.83 kg/m²     Current Shift:  08/21 0701 - 08/21 1900  In: 0 [P.O.:240; I.V.:350]  Out: 1325 [Urine:1325]  Last three shifts:  08/19 1901 - 08/21 0700  In: 360 [P.O.:360]  Out: 8892 [Urine:3375]    Physical Exam:  General: WD, WN. Alert, cooperative, in mild  distress    HEENT: NC, Atraumatic. PERRLA, anicteric sclerae. Lungs: Bilateral wheezing   Heart:  Regular  rhythm,  No murmur, No Rubs, No Gallops  Abdomen: Soft, Non distended, Non tender.  +Bowel sounds,   Extremities: No c/c/e  Psych:   No  anxious, no agitated. Neurologic:  No acute neurological deficit. Labs: Results:       Chemistry Recent Labs     08/21/19  0640 08/20/19  0434 08/19/19  1445   * 152* 118*    132* 132*   K 3.5 4.4 4.1    97* 98*   CO2 26 24 24   BUN 27* 22* 18   CREA 1.33* 1.44* 1.53*   CA 8.4* 8.3* 8.7   AGAP 11 11 10   BUCR 20 15 12   AP  --   --  77   TP  --   --  6.1*   ALB  --   --  3.2*   GLOB  --   --  2.9   AGRAT  --   --  1.1      CBC w/Diff Recent Labs     08/21/19  0640 08/20/19  0434 08/19/19  1445   WBC 12.9 8.9 13.9*   RBC 3.95* 3.96* 4.00*   HGB 11.1* 11.3* 11.4*   HCT 33.4* 32.9* 33.6*    246 270   GRANS 92* 96* 75   LYMPH 4* 4* 6*   EOS 0 0 9*      Cardiac Enzymes Recent Labs     08/19/19  1445      CKND1 5.0*      Coagulation No results for input(s): PTP, INR, APTT in the last 72 hours. No lab exists for component: INREXT, INREXT    Lipid Panel No results found for: CHOL, CHOLPOCT, CHOLX, CHLST, CHOLV, 530020, HDL, LDL, LDLC, DLDLP, 778847, VLDLC, VLDL, TGLX, TRIGL, TRIGP, TGLPOCT, CHHD, CHHDX   BNP No results for input(s): BNPP in the last 72 hours.    Liver Enzymes Recent Labs     08/19/19  1445   TP 6.1*   ALB 3.2*   AP 77   SGOT 12      Thyroid Studies No results found for: T4, T3U, TSH, TSHEXT, TSHEXT     Procedures/imaging: see electronic medical records for all procedures/Xrays and details which were not copied into this note but were reviewed prior to creation of Kris Lino MD

## 2019-08-21 NOTE — PROGRESS NOTES
Occupational Therapy Evaluation/Treatment Attempt    Chart reviewed. Attempted Occupational Therapy Evaluation/Treatment, however, patient unable to be seen due to:  []  Nausea/vomiting  []  Eating  []  Pain  []  Patient too lethargic  []  Off Unit for testing/procedure  []  Dialysis treatment in progress   []  Telemetry Results  [x]  Other: Pt reports just walking with PT and then going to the bathroom and needs time to recover. Agreeable to second attempt in an hour    Will f/u later as patient's schedule allows.    Thank you for this referral.  Brina Lindsey, OTR/L

## 2019-08-21 NOTE — PROGRESS NOTES
Pulmonary and Sleep Medicine     Pulmonary Note    Patient: Jerad Mccallum               Sex: male          DOA: 8/19/2019       YOB: 1945      Age:  66 y.o.        LOS:  LOS: 2 days        Reason for Consult: COPD exacerbation    IMPRESSION:   Patient Active Problem List   Diagnosis Code    COPD (chronic obstructive pulmonary disease) exacerbation ([facility]) J44.1    Pneumonia J18.9    Tobacco abuse Z72.0    Chronic renal disease, stage 3, moderately decreased glomerular filtration rate between 30-59 mL/min/1.73 square meter ([facility]) N18.3    Asbestosis ([address] J61    Wheezing     Hypertension I10    Debility R53.81    Paroxysmal atrial fibrillation ([facility]) I48.0    Chronic respiratory failure with hypoxia ([facility]) J96.11      RECOMMENDATIONS:   Continue supplemental O2 via NC, titrate flow for goal SPO2> 90%  XR chest periodically to check on clearing  Continue bronchodilators, pulmonary hygiene care  Steroids - taper in Am if clinically continues to improve  Antibiotic choice: Zosyn, Azithromycin, Vancomycin. Narrow spectrum per clinical course. CXR finding could be radiological lag  Aspiration prevention bundle, head of the bed at 30' all times  Glycemic control  Stress ulcer and DVT prophylaxis  AM labs  Will defer respective systems problem management to primary and other consultant and follow patient with primary and other team  Further recommendations will be based on the patient's response to recommended treatment and results of the investigation ordered. HPI:   Mr. Jerad Mccallum is a 66 y.o. male who is known to my associate Dr. Candida Lomeli and follows for COPD, presented to ER with progressive worsening of C/O SOB associated with wheezing, cough. Patient last month was admitted in hospital and required ventilator support. Denies chest pain, fever, chills, sick contacts. Patient felt not improved with home nebulizers. In the field the patient required BiPAP but in ER came off of BiPAP. He is admitted for COPD exacerbations. This AM developed an episode of respiratory distress and required nebulized bronchodilator. Feels improved since then at the time of this evaluation. 08/21/19  Patient feels better today then yesterday  Still feels SOB limiting activities  Improving cough, wheezing and no CP, hemoptysis  Afebrile. Reviewed CXR with patient  Patient denies palpitations, syncope, orthopnea, paroxysmal nocturnal dyspnea.      Review of Systems  General ROS: negative for  - fever, chills, weight loss, fatigue and malaise  Cardiovascular ROS: negative for - chest pain, murmur, orthopnea, palpitations or paroxysmal nocturnal dyspnea  Gastrointestinal ROS: no nausea, vomiting, abdominal pain, change in bowel habits, or black or bloody stools  Dermatological ROS: negative for - pruritus, rash or skin lesion changes     Past Medical History  Past Medical History:   Diagnosis Date    [address]     prostate    COPD (chronic obstructive pulmonary disease) [facility] [address]     Hypertension     Pneumonia     Radiation effect        Past Surgical History  Past Surgical History:   Procedure Laterality Date    HX HERNIA REPAIR         Family History  Family History   Problem Relation Age of Onset    Heart Disease Mother        Social History  Social History     Tobacco Use    Smoking status: Former Smoker     Types: Cigarettes    Smokeless tobacco: Never Used   Substance Use Topics    Alcohol use: No    Drug use: Not on file        Medications  Current Facility-Administered Medications   Medication Dose Route Frequency    albuterol-ipratropium (DUO-NEB) 2.5 MG-0.5 MG/3 ML  3 mL Nebulization Q4H RT    azithromycin (ZITHROMAX) 500 mg in 0.9% sodium chloride 250 mL (VIAL-MATE)  500 mg IntraVENous Q24H    piperacillin-tazobactam (ZOSYN) 3.375 g in 0.9% sodium chloride (MBP/ADV) 100 mL MBP  3.375 g IntraVENous Q6H    sodium chloride (OCEAN) 0.65 % nasal squeeze bottle 2 Spray  2 Spray Both Nostrils Q2H PRN  methylPREDNISolone (PF) (Solu-MEDROL) injection 60 mg  60 mg IntraVENous Q6H    budesonide (PULMICORT) 500 mcg/2 ml nebulizer suspension  500 mcg Nebulization BID RT    arformoterol (BROVANA) neb solution 15 mcg  15 mcg Nebulization BID RT    apixaban (ELIQUIS) tablet 5 mg  5 mg Oral BID    metoprolol tartrate (LOPRESSOR) tablet 25 mg  25 mg Oral Q12H    Vancomycin - Pharmacokinetic Dosing  1 Each Other Rx Dosing/Monitoring    vancomycin (VANCOCIN) 1500 mg in  ml infusion  1,500 mg IntraVENous Q24H    acetaminophen (TYLENOL) tablet 650 mg  650 mg Oral Q4H PRN    naloxone (NARCAN) injection 0.4 mg  0.4 mg IntraVENous PRN    diphenhydrAMINE (BENADRYL) injection 12.5 mg  12.5 mg IntraVENous Q4H PRN    ondansetron (ZOFRAN) injection 4 mg  4 mg IntraVENous Q4H PRN     Prior to Admission medications    Medication Sig Start Date End Date Taking? Authorizing Provider   methylPREDNISolone (MEDROL) 4 mg tab Take 4 mg by mouth Follow dosing instructions. Provider, Historical   diphenhydrAMINE (BENADRYL) 25 mg capsule Take 25 mg by mouth nightly as needed for Itching. Provider, Historical   OXYGEN-AIR DELIVERY SYSTEMS 2 L by Nasal route continuous. Provider, Historical   fluticasone propionate (FLONASE) 50 mcg/actuation nasal spray 2 Sprays by Both Nostrils route daily. Provider, Historical   umeclidinium-vilanterol (ANORO ELLIPTA) 62.5-25 mcg/actuation inhaler Take 1 Puff by inhalation daily. 7/12/19   Caitlyn Underwood MD   predniSONE (DELTASONE) 20 mg tablet 40mg po daily x 5d. 7/12/19   Caitlyn Underwood MD   levoFLOXacin (LEVAQUIN) 500 mg tablet Take 1 Tab by mouth daily. 7/12/19   Caitlyn Underwood MD   apixaban (ELIQUIS) 5 mg tablet Take 1 Tab by mouth two (2) times a day. 7/12/19   Caitlyn Underwood MD   metoprolol tartrate (LOPRESSOR) 25 mg tablet Take 1 Tab by mouth every twelve (12) hours.  7/12/19   Caitlyn Underwood MD   albuterol-ipratropium (DUO-NEB) 2.5 mg-0.5 mg/3 ml nebu 3 mL by Nebulization route every four (4) hours as needed. Patient taking differently: 3 mL by Nebulization route every four (4) hours as needed for Other (Shortness of breath). 19   Byron Grigsby DO   albuterol (PROVENTIL HFA, VENTOLIN HFA, PROAIR HFA) 90 mcg/actuation inhaler Take 2 Puffs by inhalation every four (4) hours as needed for Wheezing or Shortness of Breath. 18   Oneil Norris PA-C   ipratropium (ATROVENT) 0.03 % nasal spray 2 Sprays every twelve (12) hours. Other, MD Blayne   tamsulosin (FLOMAX) 0.4 mg capsule Take 0.4 mg by mouth daily. Other, MD Blayne   chlorthalidone (HYGROTEN) 25 mg tablet Take  by mouth daily. Other, MD Blayne       Allergy  Allergies   Allergen Reactions    Aspirin Other (comments)     \"messes my stomach up\"       Physical Exam:   Vital Signs:    Blood pressure 159/71, pulse 73, temperature 98.1 °F (36.7 °C), resp. rate 18, height 5' 8\" (1.727 m), weight 89 kg (196 lb 3.2 oz), SpO2 99 %. Body mass index is 29.83 kg/m². O2 Device: Nasal cannula   O2 Flow Rate (L/min): 2 l/min   Temp (24hrs), Av.9 °F (36.6 °C), Min:97.5 °F (36.4 °C), Max:98.5 °F (36.9 °C)       Intake/Output:   Last shift:       07 - 1900  In: 0 [P.O.:240;  I.V.:350]  Out: 1325 [Urine:1325]  Last 3 shifts: 1901 -  0700  In: 360 [P.O.:360]  Out: 3375 [Urine:3375]    Intake/Output Summary (Last 24 hours) at 2019 1756  Last data filed at 2019 1732  Gross per 24 hour   Intake 590 ml   Output 2525 ml   Net -1935 ml      General: AAO x 3, in no respiratory distress, cooperative, appears stated age, on O2 via NC  HEENT: PERRLA, EOMI, throat normal without erythema or exudate  Neck: No abnormally enlarged lymph nodes or thyroid, supple  Chest: increased AP diameter  Lungs: decreased air exchange bilaterally and improved wheezes bilaterally, normal percussion bilaterally, no tenderness/ rash  Heart: Regular rate and rhythm, S1S2 present or without murmur or extra heart sounds  Abdomen: non distended, bowel sounds normoactive, tympanic, soft without significant tenderness, masses, organomegaly or guarding  Extremity: negative for edema, cyanosis, clubbing  Neuro: alert, oriented x 3, no defects noted in general exam.  Skin: Skin color, texture, turgor normal. No rashes or lesions    Labs Reviewed:  Recent Results (from the past 24 hour(s))   METABOLIC PANEL, BASIC    Collection Time: 08/21/19  6:40 AM   Result Value Ref Range    Sodium 139 136 - 145 mmol/L    Potassium 3.5 3.5 - 5.5 mmol/L    Chloride 102 100 - 111 mmol/L    CO2 26 21 - 32 mmol/L    Anion gap 11 3.0 - 18 mmol/L    Glucose 139 (H) 74 - 99 mg/dL    BUN 27 (H) 7.0 - 18 MG/DL    Creatinine 1.33 (H) 0.6 - 1.3 MG/DL    BUN/Creatinine ratio 20 12 - 20      GFR est AA >60 >60 ml/min/1.73m2    GFR est non-AA 52 (L) >60 ml/min/1.73m2    Calcium 8.4 (L) 8.5 - 10.1 MG/DL   MAGNESIUM    Collection Time: 08/21/19  6:40 AM   Result Value Ref Range    Magnesium 2.2 1.6 - 2.6 mg/dL   CBC WITH AUTOMATED DIFF    Collection Time: 08/21/19  6:40 AM   Result Value Ref Range    WBC 12.9 4.6 - 13.2 K/uL    RBC 3.95 (L) 4.70 - 5.50 M/uL    HGB 11.1 (L) 13.0 - 16.0 g/dL    HCT 33.4 (L) 36.0 - 48.0 %    MCV 84.6 74.0 - 97.0 FL    MCH 28.1 24.0 - 34.0 PG    MCHC 33.2 31.0 - 37.0 g/dL    RDW 13.8 11.6 - 14.5 %    PLATELET 022 149 - 018 K/uL    MPV 8.7 (L) 9.2 - 11.8 FL    NEUTROPHILS 92 (H) 40 - 73 %    LYMPHOCYTES 4 (L) 21 - 52 %    MONOCYTES 4 3 - 10 %    EOSINOPHILS 0 0 - 5 %    BASOPHILS 0 0 - 2 %    ABS. NEUTROPHILS 11.9 (H) 1.8 - 8.0 K/UL    ABS. LYMPHOCYTES 0.5 (L) 0.9 - 3.6 K/UL    ABS. MONOCYTES 0.5 0.05 - 1.2 K/UL    ABS. EOSINOPHILS 0.0 0.0 - 0.4 K/UL    ABS.  BASOPHILS 0.0 0.0 - 0.1 K/UL    DF AUTOMATED             Recent Labs     08/19/19  1525   FIO2I 35   HCO3I 22.8   PCO2I 37.2   PHI 7.396   PO2I 151*       All Micro Results     Procedure Component Value Units Date/Time    CULTURE, RESPIRATORY/SPUTUM/BRONCH W Madeleine Avelar STAIN [[id number]] Collected:  08/20/19 1300    Order Status:  Completed Specimen:  Sputum Updated:  08/21/19 1154     Special Requests: NO SPECIAL REQUESTS        GRAM STAIN 10-25 WBC/lpf         10-25 EPI/lpf               FEW GRAM POSITIVE COCCI IN PAIRS            FEW GRAM NEGATIVE RODS               FEW GRAM NEGATIVE DIPLOCOCCI           Culture result:       FEW NORMAL RESPIRATORY ELLA          CULTURE, BLOOD [[id number]] Collected:  08/19/19 1535    Order Status:  Completed Specimen:  Blood Updated:  08/21/19 0621     Special Requests: NO SPECIAL REQUESTS        Culture result: NO GROWTH 2 DAYS       CULTURE, BLOOD [[id number]] Collected:  08/19/19 1600    Order Status:  Completed Specimen:  Blood Updated:  08/21/19 0621     Special Requests: NO SPECIAL REQUESTS        Culture result: NO GROWTH 2 DAYS       LEGIONELLA PNEUMOPHILA AG, URINE [[id number]] Collected:  08/20/19 1300    Order Status:  Completed Specimen:  Urine, random Updated:  08/20/19 2045     Legionella Ag, urine NEGATIVE        STREP PNEUMO AG, URINE [[id number]] Collected:  08/20/19 1300    Order Status:  Completed Specimen:  Urine, random Updated:  08/20/19 2045     Strep pneumo Ag, urine NEGATIVE                Imaging:  [x]I have personally reviewed the patients chest radiographs images and report with the patient  Results from [place] encounter on 08/19/19   XR CHEST PA LAT    Narrative EXAM: XR CHEST PA LAT    CLINICAL INDICATION/HISTORY: Right lung opacities, chronic obstructive pulmonary  disease, pneumonia  -Additional: None    COMPARISON: 8/19/2019    TECHNIQUE: Portable frontal view of the chest    _______________    FINDINGS:    SUPPORT DEVICES: None. HEART AND MEDIASTINUM: Stable appearing cardiac size and mediastinal contours. LUNGS AND PLEURAL SPACES: Redemonstration of asymmetric opacity at the right  lung base which is slightly increased in appearance from immediate comparison  examination.  Pleural-based calcific density unchanged. No evidence of  pneumothorax or pleural effusion. BONY THORAX AND SOFT TISSUES: No acute osseous abnormality. _______________      Impression IMPRESSION:      1. Mild interval increase in asymmetric opacity at the right lung base, which  may reflect mild interval worsening of airspace disease and/or atelectasis. [x]See my orders for details    My assessment, plan of care, findings, medications, side effects etc were discussed with:  [x]nursing []PT/OT    [x]respiratory therapy []Dr. Broderick Osuna [x]Patient     [x]High complex decision making performed and > 50% time spent in face to face consultation.     Diamond Talbot MD

## 2019-08-21 NOTE — PROGRESS NOTES
Problem: Mobility Impaired (Adult and Pediatric)  Goal: *Acute Goals and Plan of Care (Insert Text)  Description  Physical Therapy Goals   Initiated 8/21/2019 and to be accomplished within 3-5 day(s)  1. Patient will move from supine <> sit with S in prep for out of bed activity and change of position. 2.  Patient will perform sit<> stand with S with LRAD in prep for transfers/ambulation. 3.  Patient will transfer from bed <> chair with S with LRAD for time up in chair for completion of ADL activity. 4.  Patient will ambulate 150 feet with LRAD/S for improved functional mobility/safe discharge. 5.  Patient will ascend/descend 3-5 stairs with handrail with minimal assistance/contact guard assist for home re-entry as needed. Outcome: Progressing Towards Goal   PHYSICAL THERAPY EVALUATION    Patient: Ryann Griffin (21 y.o. male)  Date: 8/21/2019  Primary Diagnosis: COPD (chronic obstructive pulmonary disease) ([facility]) [J44.9]  RLL pneumonia ([facility]) [J18.1]        Precautions: Fall(O2, SPO2)    ASSESSMENT :  Based on the objective data described below, the patient presents with decrease independence w/ bed mobility, transfers, gait, and step negotiation. Pt seen in supine prior to session w/ supplemental O2 donned at 2.5 L and telemonitor donned. Pt reported no pain at this time. Pt reported PTA that he uses a RW to assist w/ mobility. Pt reports that he uses home O2 at 2 L. Upon sitting at the EOB pt became red and began to excessively cough, however pt reports no SOB. Pt's O2 assessed at 99 on supplemental O2. Pt able to ambulate w/ RW/GB in the hallway w/o any difficulty and no LOB noted. Pt transferred back to room where pt was left in upright chair after session, pt's O2 levels assessed at 98 w/ supplemental O2 donned after activity. Call bell and tray in reach, nurse notified after session. Patient will benefit from skilled intervention to address the above impairments.   Patients rehabilitation potential is considered to be Good  Factors which may influence rehabilitation potential include:   ? None noted  ? Mental ability/status  ? Medical condition  ? Home/family situation and support systems  ? Safety awareness  ? Pain tolerance/management  ? Other:      PLAN :  Recommendations and Planned Interventions:  ?           Bed Mobility Training             ? Neuromuscular Re-Education  ? Transfer Training                   ? Orthotic/Prosthetic Training  ? Gait Training                          ? Modalities  ? Therapeutic Exercises          ? Edema Management/Control  ? Therapeutic Activities            ? Patient and Family Training/Education  ? Other (comment):    Frequency/Duration: Patient will be followed by physical therapy 1-2 times per day to address goals. Discharge Recommendations: Home Health  Further Equipment Recommendations for Discharge: N/A     SUBJECTIVE:   Patient stated I feel pretty good.     OBJECTIVE DATA SUMMARY:     Past Medical History:   Diagnosis Date    Cancer ([facility] [address]     prostate    [facility]) [facility]     Hypertension     Pneumonia     Radiation effect      Past Surgical History:   Procedure Laterality Date    HX HERNIA REPAIR       Barriers to Learning/Limitations: yes;  physical  Compensate with: Verbal Cues and Tactile Cues  Prior Level of Function/Home Situation:   Home Situation  Home Environment: Trailer/mobile home  # Steps to Enter: 6  Rails to Enter: Yes  Hand Rails : Bilateral  One/Two Story Residence: One story  Living Alone: No  Support Systems: Spouse/Significant Other/Partner, Family member(s)  Patient Expects to be Discharged to[de-identified] Trailer/mobile home  Current DME Used/Available at Home: Walker, rolling, Oxygen, portable, Nebulizer, Cane, straight  Tub or Shower Type: Shower  Critical Behavior:  Neurologic State: Alert  Orientation Level: Oriented X4  Cognition: Appropriate decision making; Appropriate for age attention/concentration; Appropriate safety awareness; Follows commands  Safety/Judgement: Awareness of environment; Fall prevention;Decreased insight into deficits  Psychosocial  Patient Behaviors: Calm; Cooperative; Appropriate for age  Needs Expressed: Educational;Emotional  Purposeful Interaction: Yes  Pt Identified Daily Priority: Clinical issues (comment)  Caritas Process: Nurture loving kindness;Enable irais/hope;Establish trust;Nurture spiritual self;Teaching/learning; Attend basic human needs;Create healing environment;Open life/death unknowns  Caring Interventions: Reassure  Reassure: Therapeutic listening; Informing; Acceptance; Instilling irais and hope  Therapeutic Modalities: Deep breathing;Humor; Intentional therapeutic touch  Strength:    Strength: Generally decreased, functional  Tone & Sensation:   Tone: Normal  Sensation: Intact  Range Of Motion:  AROM: Generally decreased, functional  PROM: Generally decreased, functional  Functional Mobility:  Bed Mobility:  Supine to Sit: Stand-by assistance  Sit to Supine: Stand-by assistance  Scooting: Stand-by assistance  Transfers:  Sit to Stand: Supervision;Contact guard assistance  Stand to Sit: Supervision;Contact guard assistance  Balance:   Sitting: Intact  Standing: Intact; With support  Ambulation/Gait Training:  Distance (ft): 120 Feet (ft)  Assistive Device: Gait belt;Walker, rolling  Ambulation - Level of Assistance: Contact guard assistance  Gait Description (WDL): Exceptions to WDL  Gait Abnormalities: Decreased step clearance  Speed/[place]bet: Slow  Step Length: Left shortened;Right shortened  Swing Pattern: Left asymmetrical;Right asymmetrical  Pain:  Pain Scale 1: Numeric (0 - 10)  Pain Intensity 1: 0  Activity Tolerance:   Good  Please refer to the flowsheet for vital signs taken during this treatment.   After treatment:   ?         Patient left in no apparent distress sitting up in chair  ? Patient left in no apparent distress in bed  ? Call bell left within reach  ? Nursing notified  ? Caregiver present  ? Bed alarm activated    COMMUNICATION/EDUCATION:   ?         Fall prevention education was provided and the patient/caregiver indicated understanding. ? Patient/family have participated as able in goal setting and plan of care. ?         Patient/family agree to work toward stated goals and plan of care. ?         Patient understands intent and goals of therapy, but is neutral about his/her participation. ? Patient is unable to participate in goal setting and plan of care.     Thank you for this referral.  Paula Jara, PT   Time Calculation: 21 mins   Eval Complexity: History: HIGH Complexity :3+ comorbidities / personal factors will impact the outcome/ POC Exam:LOW Complexity : 1-2 Standardized tests and measures addressing body structure, function, activity limitation and / or participation in recreation  Presentation: LOW Complexity : Stable, uncomplicated  Clinical Decision Making:Low Complexity ambulate >30ft  Overall Complexity:LOW

## 2019-08-21 NOTE — PROGRESS NOTES
Problem: Self Care Deficits Care Plan (Adult)  Goal: *Acute Goals and Plan of Care (Insert Text)  Description  Initial Occupational Therapy Goals (8/21/2019) Within 7 day(s):    1. Patient will perform grooming standing at sink with Supervision x 2-3 minutes for increased independence with ADLs. 2. Patient will perform UB dressing with setup for increased independence with ADLs. 3. Patient will perform LB dressing with setup & A/E PRN for increased independence with ADLs. 4. Patient will perform all aspects of toileting with Supervision/mod I for increased independence in ADLs  5. Patient will independently apply energy conservation techniques with 1 verbal cue(s) for increased independence with ADLs. 6. Patient will utilize good body mechanics during ADLs with 1 verbal cue(s). 7. Patient will independently manage O2 tubing to safely ambulate to/from bathroom. Outcome: Progressing Towards Goal     OCCUPATIONAL THERAPY EVALUATION    Patient: Jose Cruz Ling (89 y.o. male)  Date: 8/21/2019  Primary Diagnosis: COPD (chronic obstructive pulmonary disease) ([facility]) [J44.9]  RLL pneumonia ([facility]) [J18.1]        Precautions:  Fall(O2, SPO2)  PLOF: Patient was grossly mod I w/ need for frequent rest breaks; uses SPC; minimal community outings & decreased Energy Conservation/Work Simplification Techniques (walks into a store, then goes to car if unable to walk full distance of store with spouse)    ASSESSMENT :  Based on the objective data described below, the patient presents with decreased functional endurance. Pt well-known to this OT from previous admissions. Pt very pleasant and motivated, but limited w/ STM d/t very Hard-of-hearing. Pt has been instructed on multiple occasions for Energy Conservation/Work Simplification Techniques but continues to use SPC and overexert self. Candid discussion regarding pt only able to complete ADLs w/o energy for leisure or community.  Pt reports he used to enjoy playing sports with the kids. Encouraged leisure exploration and use of motorized w/c in stores to increase time out in community to spend valuable time w/ spouse. Education: Patient instructed on home safety, body mechanics for optimal respiratory effort, Energy Conservation/Work Simplification Techniques, adaptive strategies and adaptive dressing techniques including clothing modifications with patient verbalizing understanding at this time. Patient will benefit from skilled intervention to address the above impairments. Patient's rehabilitation potential is considered to be Fair  Factors which may influence rehabilitation potential include:   ? None noted  ? Mental ability/status  ? Medical condition  ? Home/family situation and support systems  ? Safety awareness  ? Pain tolerance/management  ? Other:      PLAN :  Recommendations and Planned Interventions:   ?               Self Care Training                  ? Therapeutic Activities  ? Functional Mobility Training   ? Cognitive Retraining  ? Therapeutic Exercises           ? Endurance Activities  ? Balance Training                    ? Neuromuscular Re-Education  ? Visual/Perceptual Training     ? Home Safety Training  ? Patient Education                   ? Family Training/Education  ? Other (comment):    Frequency/Duration: Patient will be followed by occupational therapy 1-2 times per day/1-3 days per week to address goals. Discharge Recommendations: Home Health  Further Equipment Recommendations for Discharge: Shower chair; Rollator; ? Transport w/c      SUBJECTIVE:   Patient stated Joshua Peraza only get out maybe once a month and if I get tired, I go back and sit in the car and wait for my wife.     OBJECTIVE DATA SUMMARY:     Past Medical History:   Diagnosis Date    Cancer [facility])     prostate    COPD (chronic obstructive pulmonary disease) ([facility] [address]     Hypertension     Pneumonia     Radiation effect      Past Surgical History:   Procedure Laterality Date    HX HERNIA REPAIR       Barriers to Learning/Limitations: yes;  sensory deficits-vision/hearing/speech and physical  Compensate with: visual, verbal, tactile, kinesthetic cues/model    Home Situation:   Home Situation  Home Environment: Trailer/mobile home  # Steps to Enter: 6  Rails to Enter: Yes  Hand Rails : Bilateral  One/Two Story Residence: One story  Living Alone: No  Support Systems: Spouse/Significant Other/Partner, Family member(s)  Patient Expects to be Discharged to[de-identified] Trailer/mobile home  Current DME Used/Available at Home: Walker, rolling, Oxygen, portable, Nebulizer, Cane, straight  Tub or Shower Type: Shower  ? Right hand dominant   ? Left hand dominant    Cognitive/Behavioral Status:  Neurologic State: Alert  Orientation Level: Oriented X4  Cognition: Appropriate decision making; Appropriate for age attention/concentration; Appropriate safety awareness; Follows commands  Safety/Judgement: Awareness of environment; Fall prevention;Decreased insight into deficits    Skin: fragile, but appears intact  Edema: No significant edema noted     Vision/Perceptual:       Appears WFL (wears reading glasses)    Coordination: BUE  Coordination: Within functional limits  Fine Motor Skills-Upper: Left Intact; Right Intact    Gross Motor Skills-Upper: Left Intact; Right Intact    Balance:  Sitting: Intact  Standing: Intact; With support    Strength: BUE  Strength: Generally decreased, functional    Tone & Sensation: BUE  Tone: Normal  Sensation: Intact    Range of Motion: BUE  AROM: Generally decreased, functional    Functional Mobility and Transfers for ADLs:  Bed Mobility:  Supine to Sit: Stand-by assistance; Additional time  Sit to Supine: Stand-by assistance  Scooting: Stand-by assistance  Transfers:  Sit to Stand: Contact guard assistance; Adaptive equipment  Bed to Chair: Contact guard assistance; Adaptive equipment   Toilet Transfer : Contact guard assistance; Adaptive equipment   Bathroom Mobility: Contact guard assistance    ADL Assessment:   Feeding: Setup    Oral Facial Hygiene/Grooming: Setup(seated)    Bathing: Maximum assistance    Upper Body Dressing: Setup    Lower Body Dressing: Moderate assistance; Additional time    Toileting: Contact guard assistance    ADL Intervention:  Feeding  Drink to Mouth: Set-up    Grooming  Washing Hands: Stand-by assistance    Lower Body Dressing Assistance  Socks: Total assistance (dependent)(concerned that R thumb IV would get kinked)    Cognitive Retraining  Problem Solving: Inductive reason; Identifying the task; Identifying the problem;General alternative solution;Deductive reason; Awareness of environment  Executive Functions: Executing cognitive plans  Organizing/Sequencing: Breaking task down;Prioritizing  Safety/Judgement: Awareness of environment; Fall prevention;Decreased insight into deficits    Pain:  Pain level pre-treatment: 0/10   Pain level post-treatment: 0/10   Pain Intervention(s): Medication provided by Nursing (see MAR); Rest, Repositioning   Response to intervention: Nurse notified, See doc flow sheet    Activity Tolerance:   Fair-. Patient able to stand 1-2 minute(s). Patient able to complete ADLs with frequent rest breaks. Patient limited by respiratory status, functional endurance, body mechanics, Energy Conservation/Work Simplification Techniques. Patient unsteady. Please refer to the flowsheet for vital signs taken during this treatment. After treatment:   ? Patient left in no apparent distress sitting up in chair  ? Patient left in no apparent distress in bed  ? Call bell left within reach  ? Nursing notified  ? Caregiver present  ? Bed alarm activated    COMMUNICATION/EDUCATION:   ? Role of Occupational Therapy in the acute care setting  ?  Home safety education was provided and the patient/caregiver indicated understanding. ? Patient/family have participated as able in goal setting and plan of care. ? Patient/family agree to work toward stated goals and plan of care. ? Patient understands intent and goals of therapy, but is neutral about his/her participation. ? Patient is unable to participate in goal setting and plan of care. Thank you for this referral.  Macario Goodrich  Time Calculation: 14 mins    Eval Complexity: History: HIGH Complexity : Extensive review of history including physical, cognitive and psychosocial history ; Examination: HIGH Complexity : 5 or more performance deficits relating to physical, cognitive , or psychosocial skils that result in activity limitations and / or participation restrictions; Decision Making:HIGH Complexity : Patient presents with comorbidities that affect occupational performance.  Signifigant modification of tasks or assistance (eg, physical or verbal) with assessment (s) is necessary to enable patient to complete evaluation

## 2019-08-22 LAB
ANION GAP SERPL CALC-SCNC: 11 MMOL/L (ref 3–18)
BASOPHILS # BLD: 0 K/UL (ref 0–0.1)
BASOPHILS NFR BLD: 0 % (ref 0–2)
BUN SERPL-MCNC: 33 MG/DL (ref 7–18)
BUN/CREAT SERPL: 26 (ref 12–20)
CALCIUM SERPL-MCNC: 8 MG/DL (ref 8.5–10.1)
CHLORIDE SERPL-SCNC: 103 MMOL/L (ref 100–111)
CO2 SERPL-SCNC: 25 MMOL/L (ref 21–32)
CREAT SERPL-MCNC: 1.25 MG/DL (ref 0.6–1.3)
DATE LAST DOSE: NORMAL
DIFFERENTIAL METHOD BLD: ABNORMAL
EOSINOPHIL # BLD: 0 K/UL (ref 0–0.4)
EOSINOPHIL NFR BLD: 0 % (ref 0–5)
ERYTHROCYTE [DISTWIDTH] IN BLOOD BY AUTOMATED COUNT: 13.9 % (ref 11.6–14.5)
GLUCOSE SERPL-MCNC: 117 MG/DL (ref 74–99)
HCT VFR BLD AUTO: 31.9 % (ref 36–48)
HGB BLD-MCNC: 10.8 G/DL (ref 13–16)
LYMPHOCYTES # BLD: 0.9 K/UL (ref 0.9–3.6)
LYMPHOCYTES NFR BLD: 7 % (ref 21–52)
MAGNESIUM SERPL-MCNC: 2.2 MG/DL (ref 1.6–2.6)
MCH RBC QN AUTO: 28.7 PG (ref 24–34)
MCHC RBC AUTO-ENTMCNC: 33.9 G/DL (ref 31–37)
MCV RBC AUTO: 84.8 FL (ref 74–97)
MONOCYTES # BLD: 1 K/UL (ref 0.05–1.2)
MONOCYTES NFR BLD: 8 % (ref 3–10)
NEUTS SEG # BLD: 10.5 K/UL (ref 1.8–8)
NEUTS SEG NFR BLD: 85 % (ref 40–73)
PLATELET # BLD AUTO: 243 K/UL (ref 135–420)
PMV BLD AUTO: 8.8 FL (ref 9.2–11.8)
POTASSIUM SERPL-SCNC: 3.4 MMOL/L (ref 3.5–5.5)
RBC # BLD AUTO: 3.76 M/UL (ref 4.7–5.5)
REPORTED DOSE,DOSE: NORMAL UNITS
REPORTED DOSE/TIME,TMG: 0
SODIUM SERPL-SCNC: 139 MMOL/L (ref 136–145)
VANCOMYCIN TROUGH SERPL-MCNC: 14.3 UG/ML (ref 10–20)
WBC # BLD AUTO: 12.4 K/UL (ref 4.6–13.2)

## 2019-08-22 PROCEDURE — 94640 AIRWAY INHALATION TREATMENT: CPT

## 2019-08-22 PROCEDURE — 36415 COLL VENOUS BLD VENIPUNCTURE: CPT

## 2019-08-22 PROCEDURE — 80048 BASIC METABOLIC PNL TOTAL CA: CPT

## 2019-08-22 PROCEDURE — 74011000258 HC RX REV CODE- 258: Performed by: EMERGENCY MEDICINE

## 2019-08-22 PROCEDURE — 74011250636 HC RX REV CODE- 250/636: Performed by: HOSPITALIST

## 2019-08-22 PROCEDURE — 65660000000 HC RM CCU STEPDOWN

## 2019-08-22 PROCEDURE — 3331090001 HH PPS REVENUE CREDIT

## 2019-08-22 PROCEDURE — 80202 ASSAY OF VANCOMYCIN: CPT

## 2019-08-22 PROCEDURE — 74011250636 HC RX REV CODE- 250/636: Performed by: EMERGENCY MEDICINE

## 2019-08-22 PROCEDURE — 83735 ASSAY OF MAGNESIUM: CPT

## 2019-08-22 PROCEDURE — 77010033678 HC OXYGEN DAILY

## 2019-08-22 PROCEDURE — 94760 N-INVAS EAR/PLS OXIMETRY 1: CPT

## 2019-08-22 PROCEDURE — 85025 COMPLETE CBC W/AUTO DIFF WBC: CPT

## 2019-08-22 PROCEDURE — 74011000250 HC RX REV CODE- 250: Performed by: HOSPITALIST

## 2019-08-22 PROCEDURE — 97116 GAIT TRAINING THERAPY: CPT

## 2019-08-22 PROCEDURE — 74011250637 HC RX REV CODE- 250/637: Performed by: FAMILY MEDICINE

## 2019-08-22 PROCEDURE — 3331090002 HH PPS REVENUE DEBIT

## 2019-08-22 PROCEDURE — 74011250637 HC RX REV CODE- 250/637: Performed by: HOSPITALIST

## 2019-08-22 RX ORDER — GUAIFENESIN 600 MG/1
600 TABLET, EXTENDED RELEASE ORAL EVERY 12 HOURS
Status: DISCONTINUED | OUTPATIENT
Start: 2019-08-22 | End: 2019-08-23 | Stop reason: HOSPADM

## 2019-08-22 RX ORDER — POTASSIUM CHLORIDE 20 MEQ/1
40 TABLET, EXTENDED RELEASE ORAL
Status: COMPLETED | OUTPATIENT
Start: 2019-08-22 | End: 2019-08-22

## 2019-08-22 RX ADMIN — METOPROLOL TARTRATE 25 MG: 25 TABLET, FILM COATED ORAL at 21:20

## 2019-08-22 RX ADMIN — GUAIFENESIN 600 MG: 600 TABLET, EXTENDED RELEASE ORAL at 22:22

## 2019-08-22 RX ADMIN — POTASSIUM CHLORIDE 40 MEQ: 20 TABLET, EXTENDED RELEASE ORAL at 11:20

## 2019-08-22 RX ADMIN — DIPHENHYDRAMINE HYDROCHLORIDE 12.5 MG: 50 INJECTION, SOLUTION INTRAMUSCULAR; INTRAVENOUS at 17:44

## 2019-08-22 RX ADMIN — IPRATROPIUM BROMIDE AND ALBUTEROL SULFATE 3 ML: .5; 3 SOLUTION RESPIRATORY (INHALATION) at 04:48

## 2019-08-22 RX ADMIN — METHYLPREDNISOLONE SODIUM SUCCINATE 40 MG: 125 INJECTION, POWDER, FOR SOLUTION INTRAMUSCULAR; INTRAVENOUS at 21:19

## 2019-08-22 RX ADMIN — PIPERACILLIN SODIUM,TAZOBACTAM SODIUM 3.38 G: 3; .375 INJECTION, POWDER, FOR SOLUTION INTRAVENOUS at 21:21

## 2019-08-22 RX ADMIN — IPRATROPIUM BROMIDE AND ALBUTEROL SULFATE 3 ML: .5; 3 SOLUTION RESPIRATORY (INHALATION) at 15:25

## 2019-08-22 RX ADMIN — METOPROLOL TARTRATE 25 MG: 25 TABLET, FILM COATED ORAL at 09:04

## 2019-08-22 RX ADMIN — IPRATROPIUM BROMIDE AND ALBUTEROL SULFATE 3 ML: .5; 3 SOLUTION RESPIRATORY (INHALATION) at 19:57

## 2019-08-22 RX ADMIN — AZITHROMYCIN MONOHYDRATE 500 MG: 500 INJECTION, POWDER, LYOPHILIZED, FOR SOLUTION INTRAVENOUS at 14:53

## 2019-08-22 RX ADMIN — IPRATROPIUM BROMIDE AND ALBUTEROL SULFATE 3 ML: .5; 3 SOLUTION RESPIRATORY (INHALATION) at 23:09

## 2019-08-22 RX ADMIN — IPRATROPIUM BROMIDE AND ALBUTEROL SULFATE 3 ML: .5; 3 SOLUTION RESPIRATORY (INHALATION) at 00:57

## 2019-08-22 RX ADMIN — APIXABAN 5 MG: 5 TABLET, FILM COATED ORAL at 09:04

## 2019-08-22 RX ADMIN — PIPERACILLIN SODIUM,TAZOBACTAM SODIUM 3.38 G: 3; .375 INJECTION, POWDER, FOR SOLUTION INTRAVENOUS at 03:38

## 2019-08-22 RX ADMIN — PIPERACILLIN SODIUM,TAZOBACTAM SODIUM 3.38 G: 3; .375 INJECTION, POWDER, FOR SOLUTION INTRAVENOUS at 13:42

## 2019-08-22 RX ADMIN — APIXABAN 5 MG: 5 TABLET, FILM COATED ORAL at 21:20

## 2019-08-22 RX ADMIN — METHYLPREDNISOLONE SODIUM SUCCINATE 40 MG: 125 INJECTION, POWDER, FOR SOLUTION INTRAMUSCULAR; INTRAVENOUS at 09:04

## 2019-08-22 RX ADMIN — BUDESONIDE 500 MCG: 0.5 INHALANT RESPIRATORY (INHALATION) at 07:19

## 2019-08-22 RX ADMIN — IPRATROPIUM BROMIDE AND ALBUTEROL SULFATE 3 ML: .5; 3 SOLUTION RESPIRATORY (INHALATION) at 07:19

## 2019-08-22 RX ADMIN — IPRATROPIUM BROMIDE AND ALBUTEROL SULFATE 3 ML: .5; 3 SOLUTION RESPIRATORY (INHALATION) at 11:28

## 2019-08-22 RX ADMIN — ARFORMOTEROL TARTRATE 15 MCG: 15 SOLUTION RESPIRATORY (INHALATION) at 19:57

## 2019-08-22 NOTE — PROGRESS NOTES
Problem: Chronic Obstructive Pulmonary Disease (COPD)  Goal: *Oxygen saturation during activity within specified parameters  Outcome: Progressing Towards Goal  Goal: *Able to remain out of bed as prescribed  Outcome: Progressing Towards Goal  Goal: *Absence of hypoxia  Outcome: Progressing Towards Goal  Goal: *Optimize nutritional status  Outcome: Progressing Towards Goal     Problem: Patient Education: Go to Patient Education Activity  Goal: Patient/Family Education  Outcome: Progressing Towards Goal     Problem: Falls - Risk of  Goal: *Absence of Falls  Description  Document Florecita Brumfield Fall Risk and appropriate interventions in the flowsheet. Outcome: Progressing Towards Goal  Note:   Fall Risk Interventions:  Mobility Interventions: Patient to call before getting OOB, Assess mobility with egress test         Medication Interventions: Patient to call before getting OOB, Teach patient to arise slowly    Elimination Interventions: Call light in reach              Problem: Patient Education: Go to Patient Education Activity  Goal: Patient/Family Education  Outcome: Progressing Towards Goal     Problem: Pressure Injury - Risk of  Goal: *Prevention of pressure injury  Description  Document Nikos Scale and appropriate interventions in the flowsheet.   Outcome: Progressing Towards Goal  Note:   Pressure Injury Interventions:       Moisture Interventions: Absorbent underpads    Activity Interventions: Pressure redistribution bed/mattress(bed type)    Mobility Interventions: Pressure redistribution bed/mattress (bed type)    Nutrition Interventions: Document food/fluid/supplement intake    Friction and Shear Interventions: HOB 30 degrees or less                Problem: Patient Education: Go to Patient Education Activity  Goal: Patient/Family Education  Outcome: Progressing Towards Goal     Problem: Patient Education: Go to Patient Education Activity  Goal: Patient/Family Education  Outcome: Progressing Towards Goal Problem: Patient Education: Go to Patient Education Activity  Goal: Patient/Family Education  Outcome: Progressing Towards Goal

## 2019-08-22 NOTE — PROGRESS NOTES
Pulmonary and Sleep Medicine     Pulmonary Note    Patient: Gautam Saenz               Sex: male          DOA: 8/19/2019       YOB: 1945      Age:  66 y.o.        LOS:  LOS: 3 days        Reason for Consult: COPD exacerbation    IMPRESSION:   Patient Active Problem List   Diagnosis Code    COPD (chronic obstructive pulmonary disease) exacerbation ([facility] [address] J44.1    Pneumonia J18.9    Staph aureus in sputum     Tobacco abuse Z72.0    Chronic renal disease, stage 3, moderately decreased glomerular filtration rate between 30-59 mL/min/1.73 square meter ([facility] [address] N18.3    Asbestosis ([facility] [address] J61    Wheezing     Hypertension I10    Debility R53.81    Paroxysmal atrial fibrillation (HCC) I48.0    Chronic respiratory failure with hypoxia (HCC) J96.11      RECOMMENDATIONS:   Continue supplemental O2 via NC, titrate flow for goal SPO2> 90%  XR chest periodically to check on clearing  Avoid any ICS at present and in future to see if he benefits with frequency of pneumonia episodes  He is on Anoro at home. Will start Incruse while in hospital  Start Azithromycin 250 mg daily on DC home to see if frequency of exacerbations could be reduced  Continue bronchodilators, pulmonary hygiene care  Steroids - tapered  Await sensitivity for Staph aureus and narrow spectrum per result  Antibiotic choice: Zosyn, Azithromycin, Vancomycin  Contact isolation apophylactically DC if no MRSA  Aspiration prevention bundle, head of the bed at 30' all times  Glycemic control  Stress ulcer and DVT prophylaxis  AM labs  Will defer respective systems problem management to primary and other consultant and follow patient with primary and other team  Further recommendations will be based on the patient's response to recommended treatment and results of the investigation ordered.      HPI:   Mr. Gautam Saenz is a 66 y.o. male who is known to my associate Dr. Nickolas Cox and follows for COPD, presented to ER with progressive worsening of C/O SOB associated with wheezing, cough. Patient last month was admitted in hospital and required ventilator support. Denies chest pain, fever, chills, sick contacts. Patient felt not improved with home nebulizers. In the field the patient required BiPAP but in ER came off of BiPAP. He is admitted for COPD exacerbations. This AM developed an episode of respiratory distress and required nebulized bronchodilator. Feels improved since then at the time of this evaluation. 08/22/19  Patient feels better than yesterday  Feels SOB improving towards baseline  Improving cough, no wheezing, CP, hemoptysis  Afebrile. Reviewed sputum cx result with patient and Dr. Severiano Peraza  Patient denies palpitations, syncope, orthopnea, paroxysmal nocturnal dyspnea.      Review of Systems  General ROS: negative for  - fever, chills, weight loss, fatigue and malaise  Cardiovascular ROS: negative for - chest pain, murmur, orthopnea, palpitations or paroxysmal nocturnal dyspnea  Gastrointestinal ROS: no nausea, vomiting, abdominal pain, change in bowel habits, or black or bloody stools  Dermatological ROS: negative for - pruritus, rash or skin lesion changes     Past Medical History  Past Medical History:   Diagnosis Date    Cancer ([facility] [address]     prostate    COPD (chronic obstructive pulmonary disease) ([facility] [address]     Hypertension     Pneumonia     Radiation effect        Past Surgical History  Past Surgical History:   Procedure Laterality Date    HX HERNIA REPAIR         Family History  Family History   Problem Relation Age of Onset    Heart Disease Mother        Social History  Social History     Tobacco Use    Smoking status: Former Smoker     Types: Cigarettes    Smokeless tobacco: Never Used   Substance Use Topics    Alcohol use: No    Drug use: Not on file        Medications  Current Facility-Administered Medications   Medication Dose Route Frequency    Vancomycin - trough level due 8/22/19 @ 2330 ( 30 minutes prior to midnight dose )   1 Each Other ONCE    methylPREDNISolone (PF) (Solu-MEDROL) injection 40 mg  40 mg IntraVENous Q12H    albuterol-ipratropium (DUO-NEB) 2.5 MG-0.5 MG/3 ML  3 mL Nebulization Q4H RT    azithromycin (ZITHROMAX) 500 mg in 0.9% sodium chloride 250 mL (VIAL-MATE)  500 mg IntraVENous Q24H    piperacillin-tazobactam (ZOSYN) 3.375 g in 0.9% sodium chloride (MBP/ADV) 100 mL MBP  3.375 g IntraVENous Q6H    sodium chloride (OCEAN) 0.65 % nasal squeeze bottle 2 Spray  2 Spray Both Nostrils Q2H PRN    arformoterol (BROVANA) neb solution 15 mcg  15 mcg Nebulization BID RT    apixaban (ELIQUIS) tablet 5 mg  5 mg Oral BID    metoprolol tartrate (LOPRESSOR) tablet 25 mg  25 mg Oral Q12H    Vancomycin - Pharmacokinetic Dosing  1 Each Other Rx Dosing/Monitoring    vancomycin (VANCOCIN) 1500 mg in  ml infusion  1,500 mg IntraVENous Q24H    acetaminophen (TYLENOL) tablet 650 mg  650 mg Oral Q4H PRN    naloxone (NARCAN) injection 0.4 mg  0.4 mg IntraVENous PRN    diphenhydrAMINE (BENADRYL) injection 12.5 mg  12.5 mg IntraVENous Q4H PRN    ondansetron (ZOFRAN) injection 4 mg  4 mg IntraVENous Q4H PRN     Prior to Admission medications    Medication Sig Start Date End Date Taking? Authorizing Provider   methylPREDNISolone (MEDROL) 4 mg tab Take 4 mg by mouth Follow dosing instructions. Provider, Historical   diphenhydrAMINE (BENADRYL) 25 mg capsule Take 25 mg by mouth nightly as needed for Itching. Provider, Historical   OXYGEN-AIR DELIVERY SYSTEMS 2 L by Nasal route continuous. Provider, Historical   fluticasone propionate (FLONASE) 50 mcg/actuation nasal spray 2 Sprays by Both Nostrils route daily. Provider, Historical   umeclidinium-vilanterol (ANORO ELLIPTA) 62.5-25 mcg/actuation inhaler Take 1 Puff by inhalation daily.  7/12/19   Floridalma Sanderson MD   predniSONE (DELTASONE) 20 mg tablet 40mg po daily x 5d. 7/12/19   Floridalma Sanderson MD   levoFLOXacin (LEVAQUIN) 500 mg tablet Take 1 Tab by mouth daily. 19   Nancy Hassan MD   apixaban (ELIQUIS) 5 mg tablet Take 1 Tab by mouth two (2) times a day. 19   Nancy Hassan MD   metoprolol tartrate (LOPRESSOR) 25 mg tablet Take 1 Tab by mouth every twelve (12) hours. 19   Nancy Hassan MD   albuterol-ipratropium (DUO-NEB) 2.5 mg-0.5 mg/3 ml nebu 3 mL by Nebulization route every four (4) hours as needed. Patient taking differently: 3 mL by Nebulization route every four (4) hours as needed for Other (Shortness of breath). 19   Paola Alvarenga DO   albuterol (PROVENTIL HFA, VENTOLIN HFA, PROAIR HFA) 90 mcg/actuation inhaler Take 2 Puffs by inhalation every four (4) hours as needed for Wheezing or Shortness of Breath. 18   Haley Dyson PA-C   ipratropium (ATROVENT) 0.03 % nasal spray 2 Sprays every twelve (12) hours. Shant, MD Blayne   tamsulosin (FLOMAX) 0.4 mg capsule Take 0.4 mg by mouth daily. Blayne Bran MD   chlorthalidone (HYGROTEN) 25 mg tablet Take  by mouth daily. Shant, MD Blayne       Allergy  Allergies   Allergen Reactions    Aspirin Other (comments)     \"messes my stomach up\"       Physical Exam:   Vital Signs:    Blood pressure 141/67, pulse 75, temperature 97.4 °F (36.3 °C), resp. rate 18, height 5' 8\" (1.727 m), weight 89 kg (196 lb 3.2 oz), SpO2 98 %. Body mass index is 29.83 kg/m².     O2 Device: Nasal cannula   O2 Flow Rate (L/min): 2 l/min   Temp (24hrs), Av.7 °F (36.5 °C), Min:97.3 °F (36.3 °C), Max:98 °F (36.7 °C)       Intake/Output:   Last shift:      701 - 1900  In: 720 [P.O.:720]  Out: 625 [Urine:625]  Last 3 shifts: 1901 -  0700  In: 9287 [P.O.:360; I.V.:1150]  Out: 0577 [Urine:5965]    Intake/Output Summary (Last 24 hours) at 2019 1544  Last data filed at 2019 1511  Gross per 24 hour   Intake 1640 ml   Output 2725 ml   Net -1085 ml      General: AAO x 3, comfortable, appears stated age, on O2 via NC  HEENT: PERRLA, EOMI, throat normal without erythema or exudate  Neck: No abnormally enlarged lymph nodes or thyroid, supple  Chest: increased AP diameter  Lungs: decreased air exchange bilaterally, no wheezes bilaterally, normal percussion bilaterally, no tenderness/ rash  Heart: Regular rate and rhythm, S1S2 present or without murmur or extra heart sounds  Abdomen: non distended, bowel sounds normoactive, tympanic, soft without significant tenderness, masses, organomegaly or guarding  Extremity: negative for edema, cyanosis, clubbing  Neuro: alert, oriented x 3, no defects noted in general exam.  Skin: Skin color, texture, turgor normal. No rashes or lesions    Labs Reviewed:  Recent Results (from the past 24 hour(s))   MAGNESIUM    Collection Time: 08/22/19  6:30 AM   Result Value Ref Range    Magnesium 2.2 1.6 - 2.6 mg/dL   CBC WITH AUTOMATED DIFF    Collection Time: 08/22/19  6:30 AM   Result Value Ref Range    WBC 12.4 4.6 - 13.2 K/uL    RBC 3.76 (L) 4.70 - 5.50 M/uL    HGB 10.8 (L) 13.0 - 16.0 g/dL    HCT 31.9 (L) 36.0 - 48.0 %    MCV 84.8 74.0 - 97.0 FL    MCH 28.7 24.0 - 34.0 PG    MCHC 33.9 31.0 - 37.0 g/dL    RDW 13.9 11.6 - 14.5 %    PLATELET 543 104 - 123 K/uL    MPV 8.8 (L) 9.2 - 11.8 FL    NEUTROPHILS 85 (H) 40 - 73 %    LYMPHOCYTES 7 (L) 21 - 52 %    MONOCYTES 8 3 - 10 %    EOSINOPHILS 0 0 - 5 %    BASOPHILS 0 0 - 2 %    ABS. NEUTROPHILS 10.5 (H) 1.8 - 8.0 K/UL    ABS. LYMPHOCYTES 0.9 0.9 - 3.6 K/UL    ABS. MONOCYTES 1.0 0.05 - 1.2 K/UL    ABS. EOSINOPHILS 0.0 0.0 - 0.4 K/UL    ABS.  BASOPHILS 0.0 0.0 - 0.1 K/UL    DF AUTOMATED     METABOLIC PANEL, BASIC    Collection Time: 08/22/19  6:30 AM   Result Value Ref Range    Sodium 139 136 - 145 mmol/L    Potassium 3.4 (L) 3.5 - 5.5 mmol/L    Chloride 103 100 - 111 mmol/L    CO2 25 21 - 32 mmol/L    Anion gap 11 3.0 - 18 mmol/L    Glucose 117 (H) 74 - 99 mg/dL    BUN 33 (H) 7.0 - 18 MG/DL    Creatinine 1.25 0.6 - 1.3 MG/DL    BUN/Creatinine ratio 26 (H) 12 - 20      GFR est AA >60 >60 ml/min/1.73m2 GFR est non-AA 56 (L) >60 ml/min/1.73m2    Calcium 8.0 (L) 8.5 - 10.1 MG/DL           No results for input(s): FIO2I, IFO2, HCO3I, IHCO3, HCOPOC, PCO2I, PCOPOC, IPHI, PHI, PHPOC, PO2I, PO2POC in the last 72 hours.     No lab exists for component: IPOC2    All Micro Results     Procedure Component Value Units Date/Time    CULTURE, RESPIRATORY/SPUTUM/BRONCH Seble Fifi STAIN [[id number]]  (Abnormal) Collected:  08/20/19 1300    Order Status:  Completed Specimen:  Sputum Updated:  08/22/19 1052     Special Requests: NO SPECIAL REQUESTS        GRAM STAIN 10-25 WBC/lpf         10-25 EPI/lpf               FEW GRAM POSITIVE COCCI IN PAIRS            FEW GRAM NEGATIVE RODS               FEW GRAM NEGATIVE DIPLOCOCCI           Culture result:       RARE STAPHYLOCOCCUS AUREUS                  FEW NORMAL RESPIRATORY ELLA          CULTURE, BLOOD [[id number]] Collected:  08/19/19 1535    Order Status:  Completed Specimen:  Blood Updated:  08/22/19 0733     Special Requests: NO SPECIAL REQUESTS        Culture result: NO GROWTH 3 DAYS       CULTURE, BLOOD [[id number]] Collected:  08/19/19 1600    Order Status:  Completed Specimen:  Blood Updated:  08/22/19 0733     Special Requests: NO SPECIAL REQUESTS        Culture result: NO GROWTH 3 DAYS       LEGIONELLA PNEUMOPHILA AG, URINE [[id number]] Collected:  08/20/19 1300    Order Status:  Completed Specimen:  Urine, random Updated:  08/20/19 2045     Legionella Ag, urine NEGATIVE        STREP PNEUMO AG, URINE [[id number]] Collected:  08/20/19 1300    Order Status:  Completed Specimen:  Urine, random Updated:  08/20/19 2045     Strep pneumo Ag, urine NEGATIVE                Imaging:  [x]I have personally reviewed the patients chest radiographs images and report with the patient  Results from Hospital Encounter encounter on 08/19/19   XR CHEST PA LAT    Narrative EXAM: XR CHEST PA LAT    CLINICAL INDICATION/HISTORY: Right lung opacities, chronic obstructive pulmonary  disease, pneumonia  -Additional: None    COMPARISON: 8/19/2019    TECHNIQUE: Portable frontal view of the chest    _______________    FINDINGS:    SUPPORT DEVICES: None. HEART AND MEDIASTINUM: Stable appearing cardiac size and mediastinal contours. LUNGS AND PLEURAL SPACES: Redemonstration of asymmetric opacity at the right  lung base which is slightly increased in appearance from immediate comparison  examination. Pleural-based calcific density unchanged. No evidence of  pneumothorax or pleural effusion. BONY THORAX AND SOFT TISSUES: No acute osseous abnormality. _______________      Impression IMPRESSION:      1. Mild interval increase in asymmetric opacity at the right lung base, which  may reflect mild interval worsening of airspace disease and/or atelectasis. [x]See my orders for details    My assessment, plan of care, findings, medications, side effects etc were discussed with:  [x]nursing []PT/OT    [x]respiratory therapy [x]Dr. Melchor Melvin [x]Patient     [x]High complex decision making performed and > 50% time spent in face to face consultation.     Maeve Merrill MD

## 2019-08-22 NOTE — PROGRESS NOTES
Problem: Falls - Risk of  Goal: *Absence of Falls  Description  Document Alonso Campos Fall Risk and appropriate interventions in the flowsheet. Outcome: Progressing Towards Goal  Note:   Fall Risk Interventions:  No falls during shift. Non skid socks, lowest position of bed, wheels locked, pt to call before getting OOB, uses walker    Mobility Interventions: Communicate number of staff needed for ambulation/transfer, Patient to call before getting OOB         Medication Interventions: Evaluate medications/consider consulting pharmacy, Patient to call before getting OOB    Elimination Interventions: Call light in reach, Stay With Me (per policy), Toilet paper/wipes in reach              Problem: Pressure Injury - Risk of  Goal: *Prevention of pressure injury  Description  Document Nikos Scale and appropriate interventions in the flowsheet. Outcome: Progressing Towards Goal  Note:   Pressure Injury Interventions:       Moisture Interventions: Absorbent underpads    Activity Interventions: Pressure redistribution bed/mattress(bed type)    Mobility Interventions: HOB 30 degrees or less, Pressure redistribution bed/mattress (bed type)    Nutrition Interventions: Document food/fluid/supplement intake, Offer support with meals,snacks and hydration    Friction and Shear Interventions: HOB 30 degrees or less                Problem: Risk for Spread of Infection  Goal: Prevent transmission of infectious organism to others  Description  Prevent the transmission of infectious organisms to other patients, staff members, and visitors. 8/22/2019 1949 by Eddie Vazquez  Outcome: Progressing Towards Goal  Note:   Notified pt for reason of isolation. Explained the bacteria in his sputum. 8/22/2019 1947 by Eddie Vazquez  Outcome: Progressing Towards Goal  Note:   Notified pt on reason for isolation.      Problem: Patient Education:  Go to Education Activity  Goal: Patient/Family Education  Outcome: Progressing Towards Goal

## 2019-08-22 NOTE — PROGRESS NOTES
Hospitalist Progress Note-critical care note     Patient: Angel Rajan MRN: [id number]  CSN: [id number]    YOB: 1945  Age: 66 y.o. Sex: male    DOA: 8/19/2019 LOS:  LOS: 3 days            Chief complaint: copd exacerbation, pna     Assessment/Plan         Hospital Problems  Date Reviewed: 7/6/2019          Codes Class Noted POA    RLL pneumonia ([facility] ICD-10-CM: J18.1  ICD-9-CM: 486  8/19/2019 Unknown        Paroxysmal atrial fibrillation ([facility] ICD-10-CM: I48.0  ICD-9-CM: 427.31  8/19/2019 Unknown        Chronic respiratory failure with hypoxia [facility]) ICD-10-CM: J96.11  ICD-9-CM: 518.83, 799.02  8/19/2019 Unknown        Elevated creatine kinase ICD-10-CM: R74.8  ICD-9-CM: 790.5  8/19/2019 Unknown        * (Principal) COPD with acute exacerbation [facility]) ICD-10-CM: J44.1  ICD-9-CM: 491.21  7/6/2019 Yes        COPD (chronic obstructive pulmonary disease) ([facility] ICD-10-CM: J44.9  ICD-9-CM: 496  10/2/2014 Unknown        Hypertension ICD-10-CM: I10  ICD-9-CM: 401.9  Unknown Yes            Copd exacerbation   feel better   Iv steroid-weaning   breathing treatment with bronchodilator   symptom treatment   Continue iv abx   ssi for glucose control      pna -rt side   Respiratory cx : few STAPHYLOCOCCUS AUREUS will f/u with the final results   Continue vanc and zosyn and azithromycin,   strep pneumo, legionella -negative      paf   Continue eliquis and metoprolol      Chronic respiratory failure   With home nc O2       Elevated cr  Stable       HTN, accelerated  Continue home medication.     Hyponatremia   Stable     Subjective: feel fine   Will hold d.c today due to respiratory cx :STAPHYLOCOCCUS AUREUS -will wait for the final results     Disposition :1-2 days   Review of systems:    General: No fevers or chills. Cardiovascular: No chest pain or pressure. No palpitations. Pulmonary: shortness of breath better   Gastrointestinal: No nausea, vomiting.      Vital signs/Intake and Output:  Visit Vitals  BP 157/76 (BP 1 Location: Right arm, BP Patient Position: At rest;Sitting)   Pulse 62   Temp 98 °F (36.7 °C)   Resp 18   Ht 5' 8\" (1.727 m)   Wt 89 kg (196 lb 3.2 oz)   SpO2 98%   BMI 29.83 kg/m²     Current Shift:  08/22 0701 - 08/22 1900  In: 720 [P.O.:720]  Out: 325 [Urine:325]  Last three shifts:  08/20 1901 - 08/22 0700  In: 1510 [P.O.:360; I.V.:1150]  Out: 7085 [Urine:4175]    Physical Exam:  General: WD, WN. Alert, cooperative, in mild  distress    HEENT: NC, Atraumatic. PERRLA, anicteric sclerae. Lungs: No wheezing, no rales   Heart:  Regular  rhythm,  No murmur, No Rubs, No Gallops  Abdomen: Soft, Non distended, Non tender.  +Bowel sounds,   Extremities: No c/c/e  Psych:   No  anxious, no agitated. Neurologic:  No acute neurological deficit. Labs: Results:       Chemistry Recent Labs     08/22/19  0630 08/21/19  0640 08/20/19  0434   * 139* 152*    139 132*   K 3.4* 3.5 4.4    102 97*   CO2 25 26 24   BUN 33* 27* 22*   CREA 1.25 1.33* 1.44*   CA 8.0* 8.4* 8.3*   AGAP 11 11 11   BUCR 26* 20 15      CBC w/Diff Recent Labs     08/22/19  0630 08/21/19  0640 08/20/19  0434   WBC 12.4 12.9 8.9   RBC 3.76* 3.95* 3.96*   HGB 10.8* 11.1* 11.3*   HCT 31.9* 33.4* 32.9*    248 246   GRANS 85* 92* 96*   LYMPH 7* 4* 4*   EOS 0 0 0      Cardiac Enzymes No results for input(s): CPK, CKND1, WILLARD in the last 72 hours. No lab exists for component: CKRMB, TROIP   Coagulation No results for input(s): PTP, INR, APTT in the last 72 hours. No lab exists for component: INREXT, INREXT    Lipid Panel No results found for: CHOL, CHOLPOCT, CHOLX, CHLST, CHOLV, 871594, HDL, LDL, LDLC, DLDLP, 751479, VLDLC, VLDL, TGLX, TRIGL, TRIGP, TGLPOCT, CHHD, CHHDX   BNP No results for input(s): BNPP in the last 72 hours. Liver Enzymes No results for input(s): TP, ALB, TBIL, AP, SGOT, GPT in the last 72 hours.     No lab exists for component: DBIL   Thyroid Studies No results found for: T4, T3U, TSH, Valentina Booker     Procedures/imaging: see electronic medical records for all procedures/Xrays and details which were not copied into this note but were reviewed prior to creation of Kris Lino MD

## 2019-08-22 NOTE — PROGRESS NOTES
Transition of care: home when medically cleared  Met with patient and Dr. Natalie Hutson at [facility]. e patient needs follow up with Fernando Godoy. Patient states he lives with his wife reports that he has a nebulizer and home oxygen. Patient reports he has no needs at this time. Reports he has medicare and bcbs for insurance. His pcp is Dr. Princess Armendariz. He wants to return home whe medically cleared reports he has [address] but does not know the name of the agency. Cm will continue to follow  Patient does have [address] but he does not know the name of the agency states he will call them when he gets home  Care Management Interventions  PCP Verified by CM:  Yes  Transition of Care Consult (CM Consult): Home Health(patient reports he has [address] but does not know the name)  Current Support Network: Lives with Spouse  Confirm Follow Up Transport: Family  Plan discussed with Pt/Family/Caregiver: Yes  Freedom of Choice Offered: Yes  Discharge Location  Discharge Placement: Home with home health

## 2019-08-22 NOTE — ROUTINE PROCESS
Bedside and Verbal shift change report given to Sha Diego RN (oncoming nurse) by Ger Damon RN (offgoing nurse). Report included the following information SBAR and Kardex.

## 2019-08-23 VITALS
RESPIRATION RATE: 20 BRPM | HEART RATE: 67 BPM | OXYGEN SATURATION: 98 % | WEIGHT: 198.2 LBS | DIASTOLIC BLOOD PRESSURE: 66 MMHG | TEMPERATURE: 97.8 F | HEIGHT: 68 IN | SYSTOLIC BLOOD PRESSURE: 151 MMHG | BODY MASS INDEX: 30.04 KG/M2

## 2019-08-23 PROBLEM — J15.211 PNEUMONIA OF RIGHT LOWER LOBE DUE TO METHICILLIN SUSCEPTIBLE STAPHYLOCOCCUS AUREUS (MSSA) (HCC): Status: ACTIVE | Noted: 2019-08-19

## 2019-08-23 LAB
ANION GAP SERPL CALC-SCNC: 11 MMOL/L (ref 3–18)
BACTERIA SPEC CULT: ABNORMAL
BACTERIA SPEC CULT: ABNORMAL
BASOPHILS # BLD: 0 K/UL (ref 0–0.1)
BASOPHILS NFR BLD: 0 % (ref 0–2)
BUN SERPL-MCNC: 28 MG/DL (ref 7–18)
BUN/CREAT SERPL: 22 (ref 12–20)
CALCIUM SERPL-MCNC: 7.9 MG/DL (ref 8.5–10.1)
CHLORIDE SERPL-SCNC: 105 MMOL/L (ref 100–111)
CO2 SERPL-SCNC: 25 MMOL/L (ref 21–32)
CREAT SERPL-MCNC: 1.26 MG/DL (ref 0.6–1.3)
DIFFERENTIAL METHOD BLD: ABNORMAL
EOSINOPHIL # BLD: 0 K/UL (ref 0–0.4)
EOSINOPHIL NFR BLD: 0 % (ref 0–5)
ERYTHROCYTE [DISTWIDTH] IN BLOOD BY AUTOMATED COUNT: 14 % (ref 11.6–14.5)
GLUCOSE SERPL-MCNC: 122 MG/DL (ref 74–99)
GRAM STN SPEC: ABNORMAL
HCT VFR BLD AUTO: 34.3 % (ref 36–48)
HGB BLD-MCNC: 11.3 G/DL (ref 13–16)
LYMPHOCYTES # BLD: 0.9 K/UL (ref 0.9–3.6)
LYMPHOCYTES NFR BLD: 8 % (ref 21–52)
MAGNESIUM SERPL-MCNC: 2.1 MG/DL (ref 1.6–2.6)
MCH RBC QN AUTO: 28.5 PG (ref 24–34)
MCHC RBC AUTO-ENTMCNC: 32.9 G/DL (ref 31–37)
MCV RBC AUTO: 86.4 FL (ref 74–97)
MONOCYTES # BLD: 0.8 K/UL (ref 0.05–1.2)
MONOCYTES NFR BLD: 8 % (ref 3–10)
NEUTS SEG # BLD: 8.6 K/UL (ref 1.8–8)
NEUTS SEG NFR BLD: 84 % (ref 40–73)
PLATELET # BLD AUTO: 252 K/UL (ref 135–420)
PMV BLD AUTO: 8.7 FL (ref 9.2–11.8)
POTASSIUM SERPL-SCNC: 4.1 MMOL/L (ref 3.5–5.5)
RBC # BLD AUTO: 3.97 M/UL (ref 4.7–5.5)
SERVICE CMNT-IMP: ABNORMAL
SODIUM SERPL-SCNC: 141 MMOL/L (ref 136–145)
WBC # BLD AUTO: 10.3 K/UL (ref 4.6–13.2)

## 2019-08-23 PROCEDURE — 74011000258 HC RX REV CODE- 258: Performed by: EMERGENCY MEDICINE

## 2019-08-23 PROCEDURE — 74011250636 HC RX REV CODE- 250/636: Performed by: EMERGENCY MEDICINE

## 2019-08-23 PROCEDURE — 3331090002 HH PPS REVENUE DEBIT

## 2019-08-23 PROCEDURE — 94640 AIRWAY INHALATION TREATMENT: CPT

## 2019-08-23 PROCEDURE — 74011250636 HC RX REV CODE- 250/636: Performed by: HOSPITALIST

## 2019-08-23 PROCEDURE — 77010033678 HC OXYGEN DAILY

## 2019-08-23 PROCEDURE — 94760 N-INVAS EAR/PLS OXIMETRY 1: CPT

## 2019-08-23 PROCEDURE — 74011250637 HC RX REV CODE- 250/637: Performed by: HOSPITALIST

## 2019-08-23 PROCEDURE — 74011250637 HC RX REV CODE- 250/637: Performed by: FAMILY MEDICINE

## 2019-08-23 PROCEDURE — 97116 GAIT TRAINING THERAPY: CPT

## 2019-08-23 PROCEDURE — 85025 COMPLETE CBC W/AUTO DIFF WBC: CPT

## 2019-08-23 PROCEDURE — 36415 COLL VENOUS BLD VENIPUNCTURE: CPT

## 2019-08-23 PROCEDURE — 83735 ASSAY OF MAGNESIUM: CPT

## 2019-08-23 PROCEDURE — 80048 BASIC METABOLIC PNL TOTAL CA: CPT

## 2019-08-23 PROCEDURE — 3331090001 HH PPS REVENUE CREDIT

## 2019-08-23 PROCEDURE — 74011000250 HC RX REV CODE- 250: Performed by: HOSPITALIST

## 2019-08-23 PROCEDURE — 74011250637 HC RX REV CODE- 250/637: Performed by: INTERNAL MEDICINE

## 2019-08-23 RX ORDER — PREDNISONE 5 MG/1
TABLET ORAL
Qty: 21 TAB | Refills: 0 | Status: SHIPPED | OUTPATIENT
Start: 2019-08-23 | End: 2019-09-06

## 2019-08-23 RX ORDER — AZITHROMYCIN 250 MG/1
250 TABLET, FILM COATED ORAL DAILY
Qty: 15 TAB | Refills: 0 | Status: SHIPPED | OUTPATIENT
Start: 2019-08-23 | End: 2019-10-22

## 2019-08-23 RX ORDER — GUAIFENESIN 600 MG/1
600 TABLET, EXTENDED RELEASE ORAL EVERY 12 HOURS
Qty: 10 TAB | Refills: 0 | Status: SHIPPED | OUTPATIENT
Start: 2019-08-23 | End: 2019-09-06

## 2019-08-23 RX ORDER — AMOXICILLIN AND CLAVULANATE POTASSIUM 875; 125 MG/1; MG/1
1 TABLET, FILM COATED ORAL 2 TIMES DAILY
Qty: 20 TAB | Refills: 0 | Status: SHIPPED | OUTPATIENT
Start: 2019-08-23 | End: 2019-09-02

## 2019-08-23 RX ADMIN — IPRATROPIUM BROMIDE AND ALBUTEROL SULFATE 3 ML: .5; 3 SOLUTION RESPIRATORY (INHALATION) at 15:27

## 2019-08-23 RX ADMIN — METHYLPREDNISOLONE SODIUM SUCCINATE 40 MG: 125 INJECTION, POWDER, FOR SOLUTION INTRAMUSCULAR; INTRAVENOUS at 08:43

## 2019-08-23 RX ADMIN — GUAIFENESIN 600 MG: 600 TABLET, EXTENDED RELEASE ORAL at 08:43

## 2019-08-23 RX ADMIN — METOPROLOL TARTRATE 25 MG: 25 TABLET, FILM COATED ORAL at 08:43

## 2019-08-23 RX ADMIN — PIPERACILLIN SODIUM,TAZOBACTAM SODIUM 3.38 G: 3; .375 INJECTION, POWDER, FOR SOLUTION INTRAVENOUS at 04:50

## 2019-08-23 RX ADMIN — PIPERACILLIN SODIUM,TAZOBACTAM SODIUM 3.38 G: 3; .375 INJECTION, POWDER, FOR SOLUTION INTRAVENOUS at 08:42

## 2019-08-23 RX ADMIN — VANCOMYCIN HYDROCHLORIDE 1500 MG: 10 INJECTION, POWDER, LYOPHILIZED, FOR SOLUTION INTRAVENOUS at 00:29

## 2019-08-23 RX ADMIN — AZITHROMYCIN MONOHYDRATE 500 MG: 500 INJECTION, POWDER, LYOPHILIZED, FOR SOLUTION INTRAVENOUS at 13:22

## 2019-08-23 RX ADMIN — APIXABAN 5 MG: 5 TABLET, FILM COATED ORAL at 09:38

## 2019-08-23 RX ADMIN — IPRATROPIUM BROMIDE AND ALBUTEROL SULFATE 3 ML: .5; 3 SOLUTION RESPIRATORY (INHALATION) at 07:26

## 2019-08-23 RX ADMIN — ARFORMOTEROL TARTRATE 15 MCG: 15 SOLUTION RESPIRATORY (INHALATION) at 07:26

## 2019-08-23 RX ADMIN — IPRATROPIUM BROMIDE AND ALBUTEROL SULFATE 3 ML: .5; 3 SOLUTION RESPIRATORY (INHALATION) at 04:48

## 2019-08-23 RX ADMIN — UMECLIDINIUM 1 PUFF: 62.5 AEROSOL, POWDER ORAL at 09:38

## 2019-08-23 RX ADMIN — IPRATROPIUM BROMIDE AND ALBUTEROL SULFATE 3 ML: .5; 3 SOLUTION RESPIRATORY (INHALATION) at 11:23

## 2019-08-23 RX ADMIN — PIPERACILLIN SODIUM,TAZOBACTAM SODIUM 3.38 G: 3; .375 INJECTION, POWDER, FOR SOLUTION INTRAVENOUS at 14:43

## 2019-08-23 NOTE — PROGRESS NOTES
Pulmonary and Sleep Medicine     Pulmonary Note    Patient: Juan Diego Page               Sex: male          DOA: 8/19/2019       YOB: 1945      Age:  66 y.o.        LOS:  LOS: 4 days        Reason for Consult: COPD exacerbation    IMPRESSION:   Patient Active Problem List   Diagnosis Code    COPD (chronic obstructive pulmonary disease) exacerbation (HCC) J44.1    MSSA Pneumonia J18.9    Tobacco abuse Z72.0    Chronic renal disease, stage 3, moderately decreased glomerular filtration rate between 30-59 mL/min/1.73 square meter ([address] N18.3    Asbestosis ([address] J61    Wheezing     Hypertension I10    Debility R53.81    Paroxysmal atrial fibrillation (HCC) I48.0    Chronic respiratory failure with hypoxia ([address] J96.11      RECOMMENDATIONS:   Continue supplemental O2 via NC, titrate flow for goal SPO2> 90%  Patient has home O2 at 2 Lpm  XR chest periodically outpatient to check on clearing  Avoid any ICS at present and in future [previously used Arnuity] to see if he benefits with frequency of pneumonia episodes. He is on Anoro at home- continue. Start Azithromycin 250 mg daily prescription sent to his pharmacy from office  Continue bronchodilators, pulmonary hygiene care  Steroids - taper to PO  May send home on either Augmentin or Levofloxacin for 5 more days to finish total 10 days of course. Follow up with Juan Tovar in office. Aspiration prevention bundle, head of the bed at 30' all times  Glycemic control  Stress ulcer and DVT prophylaxis  AM labs. Okay for any DC planning  Will defer respective systems problem management to primary and other consultant and follow patient with primary and other team  Further recommendations will be based on the patient's response to recommended treatment and results of the investigation ordered.      HPI:   Mr. Juan Diego Page is a 66 y.o. male who is known to my associate Dr. Juan Tovar and follows for COPD, presented to ER with progressive worsening of C/O SOB associated with wheezing, cough. Patient last month was admitted in hospital and required ventilator support. Denies chest pain, fever, chills, sick contacts. Patient felt not improved with home nebulizers. In the field the patient required BiPAP but in ER came off of BiPAP. He is admitted for COPD exacerbations. This AM developed an episode of respiratory distress and required nebulized bronchodilator. Feels improved since then at the time of this evaluation. 08/23/19  Patient feels at baseline  Feels SOB better and stable cough, no wheezing, CP, hemoptysis  Afebrile. Reviewed sputum cx result with patient and Dr. Luis Garibay  Patient denies palpitations, syncope, orthopnea, paroxysmal nocturnal dyspnea.      Review of Systems  General ROS: negative for  - fever, chills, weight loss, fatigue and malaise  Cardiovascular ROS: negative for - chest pain, murmur, orthopnea, palpitations or paroxysmal nocturnal dyspnea  Gastrointestinal ROS: no nausea, vomiting, abdominal pain, change in bowel habits, or black or bloody stools  Dermatological ROS: negative for - pruritus, rash or skin lesion changes     Past Medical History  Past Medical History:   Diagnosis Date    Cancer ([address]     prostate    COPD (chronic obstructive pulmonary disease) ([facility]     Hypertension     Pneumonia     Radiation effect        Past Surgical History  Past Surgical History:   Procedure Laterality Date    HX HERNIA REPAIR         Family History  Family History   Problem Relation Age of Onset    Heart Disease Mother        Social History  Social History     Tobacco Use    Smoking status: Former Smoker     Types: Cigarettes    Smokeless tobacco: Never Used   Substance Use Topics    Alcohol use: No    Drug use: Not on file        Medications  Current Facility-Administered Medications   Medication Dose Route Frequency    umeclidinium (INCRUSE ELLIPTA) 62.5 mcg/actuation  1 Puff Inhalation DAILY    guaiFENesin ER (MUCINEX) tablet 600 mg  600 mg Oral Q12H    methylPREDNISolone (PF) (Solu-MEDROL) injection 40 mg  40 mg IntraVENous Q12H    albuterol-ipratropium (DUO-NEB) 2.5 MG-0.5 MG/3 ML  3 mL Nebulization Q4H RT    azithromycin (ZITHROMAX) 500 mg in 0.9% sodium chloride 250 mL (VIAL-MATE)  500 mg IntraVENous Q24H    piperacillin-tazobactam (ZOSYN) 3.375 g in 0.9% sodium chloride (MBP/ADV) 100 mL MBP  3.375 g IntraVENous Q6H    sodium chloride (OCEAN) 0.65 % nasal squeeze bottle 2 Spray  2 Spray Both Nostrils Q2H PRN    arformoterol (BROVANA) neb solution 15 mcg  15 mcg Nebulization BID RT    apixaban (ELIQUIS) tablet 5 mg  5 mg Oral BID    metoprolol tartrate (LOPRESSOR) tablet 25 mg  25 mg Oral Q12H    Vancomycin - Pharmacokinetic Dosing  1 Each Other Rx Dosing/Monitoring    vancomycin (VANCOCIN) 1500 mg in  ml infusion  1,500 mg IntraVENous Q24H    acetaminophen (TYLENOL) tablet 650 mg  650 mg Oral Q4H PRN    naloxone (NARCAN) injection 0.4 mg  0.4 mg IntraVENous PRN    diphenhydrAMINE (BENADRYL) injection 12.5 mg  12.5 mg IntraVENous Q4H PRN    ondansetron (ZOFRAN) injection 4 mg  4 mg IntraVENous Q4H PRN     Prior to Admission medications    Medication Sig Start Date End Date Taking? Authorizing Provider   methylPREDNISolone (MEDROL) 4 mg tab Take 4 mg by mouth Follow dosing instructions. Provider, Historical   diphenhydrAMINE (BENADRYL) 25 mg capsule Take 25 mg by mouth nightly as needed for Itching. Provider, Historical   OXYGEN-AIR DELIVERY SYSTEMS 2 L by Nasal route continuous. Provider, Historical   fluticasone propionate (FLONASE) 50 mcg/actuation nasal spray 2 Sprays by Both Nostrils route daily. Provider, Historical   umeclidinium-vilanterol (ANORO ELLIPTA) 62.5-25 mcg/actuation inhaler Take 1 Puff by inhalation daily.  7/12/19   Kun Flores MD   predniSONE (DELTASONE) 20 mg tablet 40mg po daily x 5d. 7/12/19   Kun Flores MD   levoFLOXacin (LEVAQUIN) 500 mg tablet Take 1 Tab by mouth daily. 19   Viktoriya Holland MD   apixaban (ELIQUIS) 5 mg tablet Take 1 Tab by mouth two (2) times a day. 19   Viktoriya Holland MD   metoprolol tartrate (LOPRESSOR) 25 mg tablet Take 1 Tab by mouth every twelve (12) hours. 19   Viktoriya Holland MD   albuterol-ipratropium (DUO-NEB) 2.5 mg-0.5 mg/3 ml nebu 3 mL by Nebulization route every four (4) hours as needed. Patient taking differently: 3 mL by Nebulization route every four (4) hours as needed for Other (Shortness of breath). 19   Melissa Johnson,    albuterol (PROVENTIL HFA, VENTOLIN HFA, PROAIR HFA) 90 mcg/actuation inhaler Take 2 Puffs by inhalation every four (4) hours as needed for Wheezing or Shortness of Breath. 18   Marla Guadarrama PA-C   ipratropium (ATROVENT) 0.03 % nasal spray 2 Sprays every twelve (12) hours. Other, MD Blayne   tamsulosin (FLOMAX) 0.4 mg capsule Take 0.4 mg by mouth daily. Shant, MD Blayne   chlorthalidone (HYGROTEN) 25 mg tablet Take  by mouth daily. Shant, MD Blayne       Allergy  Allergies   Allergen Reactions    Aspirin Other (comments)     \"messes my stomach up\"       Physical Exam:   Vital Signs:    Blood pressure 151/66, pulse 67, temperature 97.8 °F (36.6 °C), resp. rate 20, height 5' 8\" (1.727 m), weight 89.9 kg (198 lb 3.2 oz), SpO2 96 %. Body mass index is 30.14 kg/m². O2 Device: Nasal cannula   O2 Flow Rate (L/min): 2 l/min   Temp (24hrs), Av.8 °F (36.6 °C), Min:97.3 °F (36.3 °C), Max:98.4 °F (36.9 °C)       Intake/Output:   Last shift:       07 - 1900  In: 720 [P.O.:720]  Out: 300 [Urine:300]  Last 3 shifts: 1901 -  0700  In: 4577 [P.O.:840;  I.V.:1400]  Out: 3775 [Urine:3775]    Intake/Output Summary (Last 24 hours) at 2019 1357  Last data filed at 2019 1327  Gross per 24 hour   Intake 1420 ml   Output 2425 ml   Net -1005 ml      General: AAO x 3, able to finish in full sentences, appears stated age, on O2 via NC  HEENT: DEVIKA EOMI, throat normal without erythema or exudate  Neck: No abnormally enlarged lymph nodes or thyroid, supple  Chest: increased AP diameter  Lungs: decreased air exchange bilaterally, no wheezes bilaterally, normal percussion bilaterally, no tenderness/ rash  Heart: Regular rate and rhythm, S1S2 present or without murmur or extra heart sounds  Abdomen: non distended, bowel sounds normoactive, tympanic, soft without significant tenderness, masses, organomegaly or guarding  Extremity: negative for edema, cyanosis, clubbing  Skin: Skin color, texture, turgor normal. No rashes or lesions    Labs Reviewed:  Recent Results (from the past 24 hour(s))   VANCOMYCIN, TROUGH    Collection Time: 08/22/19 11:00 PM   Result Value Ref Range    Vancomycin,trough 14.3 10.0 - 20.0 ug/mL    Reported dose date: 20190822      Reported dose time: 0000      Reported dose: 1500 MG UNITS   MAGNESIUM    Collection Time: 08/23/19  7:10 AM   Result Value Ref Range    Magnesium 2.1 1.6 - 2.6 mg/dL   CBC WITH AUTOMATED DIFF    Collection Time: 08/23/19  7:10 AM   Result Value Ref Range    WBC 10.3 4.6 - 13.2 K/uL    RBC 3.97 (L) 4.70 - 5.50 M/uL    HGB 11.3 (L) 13.0 - 16.0 g/dL    HCT 34.3 (L) 36.0 - 48.0 %    MCV 86.4 74.0 - 97.0 FL    MCH 28.5 24.0 - 34.0 PG    MCHC 32.9 31.0 - 37.0 g/dL    RDW 14.0 11.6 - 14.5 %    PLATELET 874 442 - 022 K/uL    MPV 8.7 (L) 9.2 - 11.8 FL    NEUTROPHILS 84 (H) 40 - 73 %    LYMPHOCYTES 8 (L) 21 - 52 %    MONOCYTES 8 3 - 10 %    EOSINOPHILS 0 0 - 5 %    BASOPHILS 0 0 - 2 %    ABS. NEUTROPHILS 8.6 (H) 1.8 - 8.0 K/UL    ABS. LYMPHOCYTES 0.9 0.9 - 3.6 K/UL    ABS. MONOCYTES 0.8 0.05 - 1.2 K/UL    ABS. EOSINOPHILS 0.0 0.0 - 0.4 K/UL    ABS.  BASOPHILS 0.0 0.0 - 0.1 K/UL    DF AUTOMATED     METABOLIC PANEL, BASIC    Collection Time: 08/23/19  7:10 AM   Result Value Ref Range    Sodium 141 136 - 145 mmol/L    Potassium 4.1 3.5 - 5.5 mmol/L    Chloride 105 100 - 111 mmol/L    CO2 25 21 - 32 mmol/L    Anion gap 11 3.0 - 18 mmol/L    Glucose 122 (H) 74 - 99 mg/dL    BUN 28 (H) 7.0 - 18 MG/DL    Creatinine 1.26 0.6 - 1.3 MG/DL    BUN/Creatinine ratio 22 (H) 12 - 20      GFR est AA >60 >60 ml/min/1.73m2    GFR est non-AA 56 (L) >60 ml/min/1.73m2    Calcium 7.9 (L) 8.5 - 10.1 MG/DL           No results for input(s): FIO2I, IFO2, HCO3I, IHCO3, HCOPOC, PCO2I, PCOPOC, IPHI, PHI, PHPOC, PO2I, PO2POC in the last 72 hours.     No lab exists for component: IPOC2    All Micro Results     Procedure Component Value Units Date/Time    CULTURE, RESPIRATORY/SPUTUM/BRONCH Lorence Harsh STAIN [[id number]]  (Abnormal)  (Susceptibility) Collected:  08/20/19 1300    Order Status:  Completed Specimen:  Sputum Updated:  08/23/19 1028     Special Requests: NO SPECIAL REQUESTS        GRAM STAIN 10-25 WBC/lpf         10-25 EPI/lpf               FEW GRAM POSITIVE COCCI IN PAIRS            FEW GRAM NEGATIVE RODS               FEW GRAM NEGATIVE DIPLOCOCCI           Culture result:       RARE STAPHYLOCOCCUS AUREUS                  FEW NORMAL RESPIRATORY ELLA          CULTURE, BLOOD [[id number]] Collected:  08/19/19 1535    Order Status:  Completed Specimen:  Blood Updated:  08/23/19 0724     Special Requests: NO SPECIAL REQUESTS        Culture result: NO GROWTH 4 DAYS       CULTURE, BLOOD [[id number]] Collected:  08/19/19 1600    Order Status:  Completed Specimen:  Blood Updated:  08/23/19 0724     Special Requests: NO SPECIAL REQUESTS        Culture result: NO GROWTH 4 DAYS       LEGIONELLA PNEUMOPHILA AG, URINE [[id number]] Collected:  08/20/19 1300    Order Status:  Completed Specimen:  Urine, random Updated:  08/20/19 2045     Legionella Ag, urine NEGATIVE        STREP PNEUMO AG, URINE [[id number]] Collected:  08/20/19 1300    Order Status:  Completed Specimen:  Urine, random Updated:  08/20/19 2045     Strep pneumo Ag, urine NEGATIVE                Imaging:  [x]I have personally reviewed the patients chest radiographs images and report with the patient  Results from Hospital Encounter encounter on 08/19/19   XR CHEST PA LAT    Narrative EXAM: XR CHEST PA LAT    CLINICAL INDICATION/HISTORY: Right lung opacities, chronic obstructive pulmonary  disease, pneumonia  -Additional: None    COMPARISON: 8/19/2019    TECHNIQUE: Portable frontal view of the chest    _______________    FINDINGS:    SUPPORT DEVICES: None. HEART AND MEDIASTINUM: Stable appearing cardiac size and mediastinal contours. LUNGS AND PLEURAL SPACES: Redemonstration of asymmetric opacity at the right  lung base which is slightly increased in appearance from immediate comparison  examination. Pleural-based calcific density unchanged. No evidence of  pneumothorax or pleural effusion. BONY THORAX AND SOFT TISSUES: No acute osseous abnormality. _______________      Impression IMPRESSION:      1. Mild interval increase in asymmetric opacity at the right lung base, which  may reflect mild interval worsening of airspace disease and/or atelectasis. [x]See my orders for details    My assessment, plan of care, findings, medications, side effects etc were discussed with:  [x]nursing []PT/OT    [x]respiratory therapy [x]Dr. Arnaldo Fan [x]Patient     [x]High complex decision making performed and > 50% time spent in face to face consultation.     Yee Sotelo MD

## 2019-08-23 NOTE — PROGRESS NOTES
Vancomycin trough = 14.3 @ 2300 on 08/22/2019      Patient 7 day course of therapy is slate to end 08/26/2019   dose of 1500 mg was hung on 0009 on 08/23/2019   based on one level kinetics :    Estimated Pharmacokinetic Parameters (based on one level)  Vd: 62 L (0.7 L/kg)   Torey: 0.043 hr-1 (T1/2 = 16.1 hrs)     Dosing Recommendations   Vancomycin dose: 1750 mg IV Q24hrs (infused over 2 hrs)   Estimated peak: 41.9 mcg/mL   Estimated trough: 16.3 mcg/mL   Estimated AUC:MARTÍN: 655 mcg*hr/mL (assumed MARTÍN 1 mcg/mL)     But shall continue on Vancomycin 1500 mg IV q 24 hr

## 2019-08-23 NOTE — ROUTINE PROCESS
Bedside and Verbal shift change report given to MEGHNA Randall (oncoming nurse) by Martha Carrasco. Ashly Moore RN (offgoing nurse). Report included the following information SBAR, Kardex, Intake/Output and MAR.

## 2019-08-23 NOTE — PROGRESS NOTES
Transition of care: home when medically cleared  Met with patient and Dr. Luis Garibay at bedside patient needs follow up with [facility]. Patient states he lives with his wife reports that he has a nebulizer and home oxygen. Patient reports he has no needs at this time. Reports he has medicare and bcbs for insurance. His pcp is Dr. Marquis Haji. He wants to return home whe medically cleared reports he has [facility] but does not know the name of the agency. Cm will continue to follow  Patient does have [facility] but he does not know the name of the agency states he will call them when he gets home. Patient ambulating in hallway with PT. Patient waiting on sputum sample culture  Care Management Interventions  PCP Verified by CM:  Yes  Transition of Care Consult (CM Consult): Home Health(patient reports he has [facility] but does not know the name)  Current Support Network: Lives with Spouse  Confirm Follow Up Transport: Family  Plan discussed with Pt/Family/Caregiver: Yes  Freedom of Choice Offered: Yes  Discharge Location  Discharge Placement: Home with home health

## 2019-08-23 NOTE — DISCHARGE SUMMARY
Discharge Summary    Patient: Devyn Diego MRN: [id number]  CSN: [id number]    YOB: 1945  Age: 66 y.o. Sex: male    DOA: 8/19/2019 LOS:  LOS: 4 days   Discharge Date:      Primary Care Provider:  Court Holman MD    Admission Diagnoses: COPD (chronic obstructive pulmonary disease) ([facility] [J44.9]  RLL pneumonia ([place] [address] [J18.1]    Discharge Diagnoses:    Hospital Problems  Date Reviewed: 7/6/2019          Codes Class Noted POA    Pneumonia of right lower lobe due to methicillin susceptible Staphylococcus aureus (MSSA) ([place] [address] ICD-10-CM: P34.580  ICD-9-CM: 482.41  8/19/2019         Paroxysmal atrial fibrillation ([place] [address] ICD-10-CM: I48.0  ICD-9-CM: 427.31  8/19/2019 Unknown        Chronic respiratory failure with hypoxia ([place] [address] ICD-10-CM: J96.11  ICD-9-CM: 518.83, 799.02  8/19/2019 Unknown        Elevated creatine kinase ICD-10-CM: R74.8  ICD-9-CM: 790.5  8/19/2019 Unknown        * (Principal) COPD with acute exacerbation ([place] [address] ICD-10-CM: J44.1  ICD-9-CM: 491.21  7/6/2019 Yes        COPD (chronic obstructive pulmonary disease) ([place] [address] ICD-10-CM: J44.9  ICD-9-CM: 340  10/2/2014 Unknown        Hypertension ICD-10-CM: I10  ICD-9-CM: 401.9  Unknown Yes              Discharge Condition: stable     Discharge Medications:     Current Discharge Medication List      START taking these medications    Details   guaiFENesin ER (MUCINEX) 600 mg ER tablet Take 1 Tab by mouth every twelve (12) hours. Qty: 10 Tab, Refills: 0      predniSONE (STERAPRED) 5 mg dose pack See administration instruction per 5mg dose pack  Qty: 21 Tab, Refills: 0      azithromycin (ZITHROMAX) 250 mg tablet Take 1 Tab by mouth daily. Qty: 15 Tab, Refills: 0      amoxicillin-clavulanate (AUGMENTIN) 875-125 mg per tablet Take 1 Tab by mouth two (2) times a day for 10 days.   Qty: 20 Tab, Refills: 0         CONTINUE these medications which have NOT CHANGED    Details   diphenhydrAMINE (BENADRYL) 25 mg capsule Take 25 mg by mouth nightly as needed for Itching. OXYGEN-AIR DELIVERY SYSTEMS 2 L by Nasal route continuous. fluticasone propionate (FLONASE) 50 mcg/actuation nasal spray 2 Sprays by Both Nostrils route daily. umeclidinium-vilanterol (ANORO ELLIPTA) 62.5-25 mcg/actuation inhaler Take 1 Puff by inhalation daily. Qty: 1 Inhaler, Refills: 0      apixaban (ELIQUIS) 5 mg tablet Take 1 Tab by mouth two (2) times a day. Qty: 60 Tab, Refills: 0      metoprolol tartrate (LOPRESSOR) 25 mg tablet Take 1 Tab by mouth every twelve (12) hours. Qty: 60 Tab, Refills: 0      albuterol-ipratropium (DUO-NEB) 2.5 mg-0.5 mg/3 ml nebu 3 mL by Nebulization route every four (4) hours as needed. Qty: 30 Nebule, Refills: 0      albuterol (PROVENTIL HFA, VENTOLIN HFA, PROAIR HFA) 90 mcg/actuation inhaler Take 2 Puffs by inhalation every four (4) hours as needed for Wheezing or Shortness of Breath. Qty: 1 Inhaler, Refills: 1      ipratropium (ATROVENT) 0.03 % nasal spray 2 Sprays every twelve (12) hours. tamsulosin (FLOMAX) 0.4 mg capsule Take 0.4 mg by mouth daily. chlorthalidone (HYGROTEN) 25 mg tablet Take  by mouth daily. STOP taking these medications       methylPREDNISolone (MEDROL) 4 mg tab Comments:   Reason for Stopping:         predniSONE (DELTASONE) 20 mg tablet Comments:   Reason for Stopping:         levoFLOXacin (LEVAQUIN) 500 mg tablet Comments:   Reason for Stopping:               Procedures : none     Consults: Pulmonary/Critical Care      PHYSICAL EXAM   Visit Vitals  /66   Pulse 67   Temp 97.8 °F (36.6 °C)   Resp 20   Ht 5' 8\" (1.727 m)   Wt 89.9 kg (198 lb 3.2 oz)   SpO2 96%   BMI 30.14 kg/m²     General: Awake, cooperative, no acute distress    HEENT: NC, Atraumatic. PERRLA, EOMI. Anicteric sclerae. Lungs:  CTA Bilaterally. No Wheezing/Rhonchi/Rales. Heart:  Regular  rhythm,  No murmur, No Rubs, No Gallops  Abdomen: Soft, Non distended, Non tender.  +Bowel sounds,   Extremities: No c/c/e  Psych:   Not anxious or agitated. Neurologic:  No acute neurological deficits. Admission HPI :     Jerad Mccallum is a 66 y.o. male who hx of chronic respiratory failure, copd, paf came to ER due to sob/cough/wheezing for several days. He used nebulizer for breathing treatment, but not improving. He denies any chest pain. cxr rt side opacity. He was put on bipap in er and received breathing tx . He was able to get rid of bipap and on his home nc O2.      Denies any slurred speech/headache/cp/n/v/blurred vission/d/c/palpitation/gait change/bleeding. Denies smoking/ any alcohol or drug use. Hospital Course :   Jerad Mccallum is a 66 y.o. male who hx of chronic respiratory failure, copd, paf was admitted for copd exacerbation and pna. Since pt  was admitted, iv steroid/breathing treatment were administrated with empiric iv abx. Glucose was controlled per SSI. With the treatment, sob resolved and nc O2 remained home O2 level. Broad coverage iv abx were administrated for PNA. Sputum cx indicated mmsa. Pulmonologist on board and recommended to azithromycin-long term use. He will f/u with dr. Candida Lomeli as out-pt . Discharge planning discussed with patient, he agrees with the plan and no questions and concerns at this point.        Activity: Activity as tolerated    Diet: Cardiac Diet    Follow-up: PCP and Dr. Candida Lomeli     Disposition: home      Minutes spent on discharge: 45 min       Labs: Results:       Chemistry Recent Labs     08/23/19  0710 08/22/19  0630 08/21/19  0640   * 117* 139*    139 139   K 4.1 3.4* 3.5    103 102   CO2 25 25 26   BUN 28* 33* 27*   CREA 1.26 1.25 1.33*   CA 7.9* 8.0* 8.4*   AGAP 11 11 11   BUCR 22* 26* 20      CBC w/Diff Recent Labs     08/23/19  0710 08/22/19  0630 08/21/19  0640   WBC 10.3 12.4 12.9   RBC 3.97* 3.76* 3.95*   HGB 11.3* 10.8* 11.1*   HCT 34.3* 31.9* 33.4*    243 248   GRANS 84* 85* 92*   LYMPH 8* 7* 4*   EOS 0 0 0 Cardiac Enzymes No results for input(s): CPK, CKND1, WILLARD in the last 72 hours. No lab exists for component: CKRMB, TROIP   Coagulation No results for input(s): PTP, INR, APTT in the last 72 hours. No lab exists for component: INREXT, INREXT    Lipid Panel No results found for: CHOL, CHOLPOCT, CHOLX, CHLST, CHOLV, 382839, HDL, LDL, LDLC, DLDLP, 439825, VLDLC, VLDL, TGLX, TRIGL, TRIGP, TGLPOCT, CHHD, CHHDX   BNP No results for input(s): BNPP in the last 72 hours. Liver Enzymes No results for input(s): TP, ALB, TBIL, AP, SGOT, GPT in the last 72 hours. No lab exists for component: DBIL   Thyroid Studies No results found for: T4, T3U, TSH, TSHEXT, TSHEXT         Significant Diagnostic Studies: Xr Chest Pa Lat    Result Date: 8/21/2019  EXAM: XR CHEST PA LAT CLINICAL INDICATION/HISTORY: Right lung opacities, chronic obstructive pulmonary disease, pneumonia -Additional: None COMPARISON: 8/19/2019 TECHNIQUE: Portable frontal view of the chest _______________ FINDINGS: SUPPORT DEVICES: None. HEART AND MEDIASTINUM: Stable appearing cardiac size and mediastinal contours. LUNGS AND PLEURAL SPACES: Redemonstration of asymmetric opacity at the right lung base which is slightly increased in appearance from immediate comparison examination. Pleural-based calcific density unchanged. No evidence of pneumothorax or pleural effusion. BONY THORAX AND SOFT TISSUES: No acute osseous abnormality. _______________     IMPRESSION: 1. Mild interval increase in asymmetric opacity at the right lung base, which may reflect mild interval worsening of airspace disease and/or atelectasis.      Xr Chest Port    Result Date: 8/19/2019  EXAM: XR CHEST PORT CLINICAL INDICATION/HISTORY: Respiratory distress -Additional: Chronic obstructive pulmonary disease COMPARISON: Several prior exams, most recently chest radiograph 7/12/2019 as well as prior CT scan of the chest 7/7/2019 TECHNIQUE: Frontal view of the chest _______________ FINDINGS: HEART AND MEDIASTINUM: Normal appearing cardiac size and mediastinal contours. LUNGS AND PLEURAL SPACES: Asymmetric opacity the right lung base is noted. Pleural-based calcification involving the anterior right hemithorax as noted on multiple prior exams. No pneumothorax. No definite pleural effusion. BONY THORAX AND SOFT TISSUES: No acute osseous abnormality _______________     IMPRESSION: 1. Mild asymmetric opacity at the right mid and lower lung base, potentially atelectasis or airspace disease. 2. Elliptical partially calcified right pleural based lesion unchanged.             [facility]     CC: Fernando Nance MD

## 2019-08-23 NOTE — PROGRESS NOTES
Problem: Falls - Risk of  Goal: *Absence of Falls  Description  Document Pal Hernandez Fall Risk and appropriate interventions in the flowsheet.   Outcome: Progressing Towards Goal  Note:   Fall Risk Interventions:  Mobility Interventions: Bed/chair exit alarm, Communicate number of staff needed for ambulation/transfer, Patient to call before getting OOB, Utilize walker, cane, or other assistive device, PT Consult for mobility concerns         Medication Interventions: Patient to call before getting OOB, Teach patient to arise slowly    Elimination Interventions: Call light in reach, Toileting schedule/hourly rounds, Urinal in reach              Problem: Patient Education: Go to Patient Education Activity  Goal: Patient/Family Education  Outcome: Progressing Towards Goal

## 2019-08-23 NOTE — PROGRESS NOTES
Problem: Mobility Impaired (Adult and Pediatric)  Goal: *Acute Goals and Plan of Care (Insert Text)  Description  Physical Therapy Goals   Initiated 8/21/2019 and to be accomplished within 3-5 day(s)  1. Patient will move from supine <> sit with S in prep for out of bed activity and change of position. 2.  Patient will perform sit<> stand with S with LRAD in prep for transfers/ambulation. 3.  Patient will transfer from bed <> chair with S with LRAD for time up in chair for completion of ADL activity. 4.  Patient will ambulate 150 feet with LRAD/S for improved functional mobility/safe discharge. 5.  Patient will ascend/descend 3-5 stairs with handrail with minimal assistance/contact guard assist for home re-entry as needed. 8/23/2019 1115 by Juan Wong PTA  Outcome: Resolved/Met     PHYSICAL THERAPY TREATMENT/DISCHARGE    Patient: Renetta Ortiz (78 y.o. male)  Date: 8/23/2019  Diagnosis: COPD (chronic obstructive pulmonary disease) ([facility] [J44.9]  RLL pneumonia ([facility] [J18.1] COPD with acute exacerbation ([facility]       Precautions: Fall(O2, SPO2)  Chart, physical therapy assessment, plan of care and goals were reviewed. ASSESSMENT:  Pt meets needs for safe home mobility, expresses understanding of HEP and is cleared from this level of PT. Progression toward goals:  X      Goals met  ? Improving appropriately and progressing toward goals  ? Improving slowly and progressing toward goals  ? Not making progress toward goals and plan of care will be adjusted     PLAN:  Patient will be discharged from physical therapy at this time. Rationale for discharge:  X Goals Achieved  ? Plateau Reached  ? Patient not participating in therapy  ? Other:  Discharge Recommendations:  Home Health  Further Equipment Recommendations for Discharge:  Portable O2 and rolling walker     SUBJECTIVE:   Patient stated I don't feel right it, but I'll do it.     OBJECTIVE DATA SUMMARY:   Critical Behavior:  Neurologic State: Alert  Orientation Level: Oriented X4  Cognition: Appropriate decision making, Appropriate for age attention/concentration, Appropriate safety awareness, Follows commands  Safety/Judgement: Awareness of environment, Fall prevention, Decreased insight into deficits  Functional Mobility Training:  Bed Mobility:  Rolling: Modified independent  Supine to Sit: Modified independent  Sit to Supine: Modified independent  Scooting: Modified independent  Transfers:  Sit to Stand: Modified independent  Stand to Sit: Modified independent  Balance:  Sitting: Intact  Standing: Intact; With support  Ambulation/Gait Training:  Distance (ft): 350 Feet (ft)  Assistive Device: Gait belt;Walker, rolling  Ambulation - Level of Assistance: Contact guard assistance  Gait Abnormalities: Decreased step clearanceBase of Support: Narrowed  Speed/Lisbet: Slow  Step Length: Left shortened;Right shortened  Swing Pattern: Left asymmetrical;Right asymmetrical  Stairs:  Number of Stairs Trained: 6(U/D)  Stairs - Level of Assistance: Supervision   Rail Use: Both  Pain:  Pain Scale 1: Numeric (0 - 10)  Pain Intensity 1: 0  Pain out:   Activity Tolerance:   Good  Please refer to the flowsheet for vital signs taken during this treatment. After treatment:   ? Patient left in no apparent distress sitting up in chair  X Patient left in no apparent distress in bed  X Call bell left within reach  X Nursing notified  ? Caregiver present  ?  Bed alarm activated  Jazmine Vigil PTA   Time Calculation: 24 mins

## 2019-08-23 NOTE — PROGRESS NOTES
1915 Pt received from offgoing nurse without any signs or symptoms of distress. Pt vitals are stable and within normal limits. Pt bed in low position with wheels locked and call bell within reach. 1958 Assessment completed and documented in flow sheet. Pt denies any further needs at this time. Pt in NAD with bed in low position, wheels locked and call bell within reach. Purposeful rounding completed. Pt resting quietly. No further needs voiced at this time. 2120 Scheduled medications administered as ordered. Purposeful rounding completed. Pt resting quietly. No further needs voiced at this time. 2222 Scheduled medications administered as ordered. Purposeful rounding completed. Pt resting quietly. No further needs voiced at this time. [id number] Reassessment completed with no changes noted. Bed locked, in lowest position, with call light within reach. Purposeful rounding completed. Pt resting quietly. No further needs voiced at this time. 0230 Purposeful rounding completed. Pt resting quietly. No further needs voiced at this time. 3058 Scheduled medications administered as ordered. Reassessment completed with no changes noted. Bed locked, in lowest position, with call light within reach. Purposeful rounding completed. Pt resting quietly. No further needs voiced at this time. 5651 Bedside and Verbal shift change report given to Ash Paige (oncoming nurse) by MEGHNA Vazquez RN (offgoing nurse). Report included the following information SBAR, Procedure Summary, Intake/Output, MAR and Recent Results.

## 2019-08-23 NOTE — PROGRESS NOTES
Problem: Mobility Impaired (Adult and Pediatric)  Goal: *Acute Goals and Plan of Care (Insert Text)  Description  Physical Therapy Goals   Initiated 8/21/2019 and to be accomplished within 3-5 day(s)  1. Patient will move from supine <> sit with S in prep for out of bed activity and change of position. 2.  Patient will perform sit<> stand with S with LRAD in prep for transfers/ambulation. 3.  Patient will transfer from bed <> chair with S with LRAD for time up in chair for completion of ADL activity. 4.  Patient will ambulate 150 feet with LRAD/S for improved functional mobility/safe discharge. 5.  Patient will ascend/descend 3-5 stairs with handrail with minimal assistance/contact guard assist for home re-entry as needed. Outcome: Not Progressing Towards Goal     Pt refused PT due to:  ? \"can you come back when this IV was done? \"   ? Eating  ? Pain  ? Pt lethargic  ? Off Unit  Will f/u later, as pt's schedule allows. Thank you.   Myles Brunner, PTA

## 2019-08-24 PROCEDURE — 3331090002 HH PPS REVENUE DEBIT

## 2019-08-24 PROCEDURE — 3331090001 HH PPS REVENUE CREDIT

## 2019-08-25 PROCEDURE — 3331090001 HH PPS REVENUE CREDIT

## 2019-08-25 PROCEDURE — 3331090002 HH PPS REVENUE DEBIT

## 2019-08-26 ENCOUNTER — PATIENT OUTREACH (OUTPATIENT)
Dept: FAMILY MEDICINE CLINIC | Age: 74
End: 2019-08-26

## 2019-08-26 PROCEDURE — 3331090002 HH PPS REVENUE DEBIT

## 2019-08-26 PROCEDURE — 3331090001 HH PPS REVENUE CREDIT

## 2019-08-27 ENCOUNTER — PATIENT OUTREACH (OUTPATIENT)
Dept: FAMILY MEDICINE CLINIC | Age: 74
End: 2019-08-27

## 2019-08-27 PROCEDURE — 3331090001 HH PPS REVENUE CREDIT

## 2019-08-27 PROCEDURE — 3331090002 HH PPS REVENUE DEBIT

## 2019-08-27 NOTE — PROGRESS NOTES
Transition Of care Follow Up    Day 2- Patient Outreached Attempted. Received patient's voicemail box. Message left requesting call back.

## 2019-08-28 PROCEDURE — 3331090002 HH PPS REVENUE DEBIT

## 2019-08-28 PROCEDURE — 3331090001 HH PPS REVENUE CREDIT

## 2019-08-29 PROCEDURE — 3331090002 HH PPS REVENUE DEBIT

## 2019-08-29 PROCEDURE — 3331090001 HH PPS REVENUE CREDIT

## 2019-08-30 PROCEDURE — 3331090002 HH PPS REVENUE DEBIT

## 2019-08-30 PROCEDURE — 3331090001 HH PPS REVENUE CREDIT

## 2019-08-31 PROCEDURE — 3331090001 HH PPS REVENUE CREDIT

## 2019-08-31 PROCEDURE — 3331090002 HH PPS REVENUE DEBIT

## 2019-09-01 PROCEDURE — 3331090002 HH PPS REVENUE DEBIT

## 2019-09-01 PROCEDURE — 3331090001 HH PPS REVENUE CREDIT

## 2019-09-02 PROCEDURE — 3331090001 HH PPS REVENUE CREDIT

## 2019-09-02 PROCEDURE — 3331090002 HH PPS REVENUE DEBIT

## 2019-09-03 PROCEDURE — 3331090002 HH PPS REVENUE DEBIT

## 2019-09-03 PROCEDURE — 3331090001 HH PPS REVENUE CREDIT

## 2019-09-04 PROCEDURE — 3331090002 HH PPS REVENUE DEBIT

## 2019-09-04 PROCEDURE — 3331090001 HH PPS REVENUE CREDIT

## 2019-09-05 PROCEDURE — 3331090002 HH PPS REVENUE DEBIT

## 2019-09-05 PROCEDURE — 3331090001 HH PPS REVENUE CREDIT

## 2019-09-06 ENCOUNTER — HOSPITAL ENCOUNTER (INPATIENT)
Age: 74
LOS: 3 days | Discharge: HOME | DRG: 190 | End: 2019-09-09
Attending: EMERGENCY MEDICINE | Admitting: HOSPITALIST
Payer: MEDICARE

## 2019-09-06 ENCOUNTER — APPOINTMENT (OUTPATIENT)
Dept: GENERAL RADIOLOGY | Age: 74
DRG: 190 | End: 2019-09-06
Attending: EMERGENCY MEDICINE
Payer: MEDICARE

## 2019-09-06 ENCOUNTER — PATIENT OUTREACH (OUTPATIENT)
Dept: FAMILY MEDICINE CLINIC | Age: 74
End: 2019-09-06

## 2019-09-06 DIAGNOSIS — J44.1 ACUTE EXACERBATION OF CHRONIC OBSTRUCTIVE PULMONARY DISEASE (COPD) (HCC): Primary | ICD-10-CM

## 2019-09-06 LAB
ALBUMIN SERPL-MCNC: 3.3 G/DL (ref 3.4–5)
ALBUMIN/GLOB SERPL: 1.2 {RATIO} (ref 0.8–1.7)
ALP SERPL-CCNC: 67 U/L (ref 45–117)
ALT SERPL-CCNC: 14 U/L (ref 16–61)
ANION GAP SERPL CALC-SCNC: 6 MMOL/L (ref 3–18)
ARTERIAL PATENCY WRIST A: YES
AST SERPL-CCNC: 9 U/L (ref 10–38)
ATRIAL RATE: 108 BPM
BASE DEFICIT BLD-SCNC: 4 MMOL/L
BASOPHILS # BLD: 0 K/UL (ref 0–0.1)
BASOPHILS NFR BLD: 0 % (ref 0–2)
BDY SITE: ABNORMAL
BILIRUB SERPL-MCNC: 0.5 MG/DL (ref 0.2–1)
BNP SERPL-MCNC: 60 PG/ML (ref 0–1800)
BUN SERPL-MCNC: 22 MG/DL (ref 7–18)
BUN/CREAT SERPL: 17 (ref 12–20)
CALCIUM SERPL-MCNC: 8.7 MG/DL (ref 8.5–10.1)
CALCULATED P AXIS, ECG09: 58 DEGREES
CALCULATED R AXIS, ECG10: 30 DEGREES
CALCULATED T AXIS, ECG11: 66 DEGREES
CHLORIDE SERPL-SCNC: 102 MMOL/L (ref 100–111)
CK MB CFR SERPL CALC: 4.7 % (ref 0–4)
CK MB SERPL-MCNC: 4.4 NG/ML (ref 5–25)
CK SERPL-CCNC: 94 U/L (ref 39–308)
CO2 SERPL-SCNC: 25 MMOL/L (ref 21–32)
CREAT SERPL-MCNC: 1.28 MG/DL (ref 0.6–1.3)
DIAGNOSIS, 93000: NORMAL
DIFFERENTIAL METHOD BLD: ABNORMAL
EOSINOPHIL # BLD: 0.5 K/UL (ref 0–0.4)
EOSINOPHIL NFR BLD: 6 % (ref 0–5)
ERYTHROCYTE [DISTWIDTH] IN BLOOD BY AUTOMATED COUNT: 14.2 % (ref 11.6–14.5)
GAS FLOW.O2 O2 DELIVERY SYS: ABNORMAL L/MIN
GAS FLOW.O2 SETTING OXYMISER: 2 L/M
GLOBULIN SER CALC-MCNC: 2.7 G/DL (ref 2–4)
GLUCOSE BLD STRIP.AUTO-MCNC: 175 MG/DL (ref 70–110)
GLUCOSE SERPL-MCNC: 119 MG/DL (ref 74–99)
HCO3 BLD-SCNC: 21.1 MMOL/L (ref 22–26)
HCT VFR BLD AUTO: 33.9 % (ref 36–48)
HGB BLD-MCNC: 11.5 G/DL (ref 13–16)
LYMPHOCYTES # BLD: 0.7 K/UL (ref 0.9–3.6)
LYMPHOCYTES NFR BLD: 7 % (ref 21–52)
MAGNESIUM SERPL-MCNC: 1.8 MG/DL (ref 1.6–2.6)
MCH RBC QN AUTO: 29.3 PG (ref 24–34)
MCHC RBC AUTO-ENTMCNC: 33.9 G/DL (ref 31–37)
MCV RBC AUTO: 86.3 FL (ref 74–97)
MONOCYTES # BLD: 0.6 K/UL (ref 0.05–1.2)
MONOCYTES NFR BLD: 7 % (ref 3–10)
NEUTS SEG # BLD: 7.4 K/UL (ref 1.8–8)
NEUTS SEG NFR BLD: 80 % (ref 40–73)
O2/TOTAL GAS SETTING VFR VENT: 28 %
P-R INTERVAL, ECG05: 152 MS
PCO2 BLD: 34.5 MMHG (ref 35–45)
PH BLD: 7.39 [PH] (ref 7.35–7.45)
PLATELET # BLD AUTO: 211 K/UL (ref 135–420)
PMV BLD AUTO: 8.9 FL (ref 9.2–11.8)
PO2 BLD: 90 MMHG (ref 80–100)
POTASSIUM SERPL-SCNC: 4 MMOL/L (ref 3.5–5.5)
PROT SERPL-MCNC: 6 G/DL (ref 6.4–8.2)
Q-T INTERVAL, ECG07: 342 MS
QRS DURATION, ECG06: 82 MS
QTC CALCULATION (BEZET), ECG08: 458 MS
RBC # BLD AUTO: 3.93 M/UL (ref 4.7–5.5)
SAO2 % BLD: 97 % (ref 92–97)
SERVICE CMNT-IMP: ABNORMAL
SODIUM SERPL-SCNC: 133 MMOL/L (ref 136–145)
SPECIMEN TYPE: ABNORMAL
TOTAL RESP. RATE, ITRR: 23
TROPONIN I SERPL-MCNC: <0.02 NG/ML (ref 0–0.04)
VENTRICULAR RATE, ECG03: 108 BPM
WBC # BLD AUTO: 9.3 K/UL (ref 4.6–13.2)

## 2019-09-06 PROCEDURE — 74011000250 HC RX REV CODE- 250: Performed by: HOSPITALIST

## 2019-09-06 PROCEDURE — 74011250637 HC RX REV CODE- 250/637: Performed by: HOSPITALIST

## 2019-09-06 PROCEDURE — 36600 WITHDRAWAL OF ARTERIAL BLOOD: CPT

## 2019-09-06 PROCEDURE — 74011000250 HC RX REV CODE- 250: Performed by: EMERGENCY MEDICINE

## 2019-09-06 PROCEDURE — 96374 THER/PROPH/DIAG INJ IV PUSH: CPT

## 2019-09-06 PROCEDURE — 74011250636 HC RX REV CODE- 250/636: Performed by: HOSPITALIST

## 2019-09-06 PROCEDURE — 82550 ASSAY OF CK (CPK): CPT

## 2019-09-06 PROCEDURE — 94760 N-INVAS EAR/PLS OXIMETRY 1: CPT

## 2019-09-06 PROCEDURE — 94640 AIRWAY INHALATION TREATMENT: CPT

## 2019-09-06 PROCEDURE — 74011250636 HC RX REV CODE- 250/636: Performed by: EMERGENCY MEDICINE

## 2019-09-06 PROCEDURE — 77010033678 HC OXYGEN DAILY

## 2019-09-06 PROCEDURE — 71045 X-RAY EXAM CHEST 1 VIEW: CPT

## 2019-09-06 PROCEDURE — 80053 COMPREHEN METABOLIC PANEL: CPT

## 2019-09-06 PROCEDURE — 83880 ASSAY OF NATRIURETIC PEPTIDE: CPT

## 2019-09-06 PROCEDURE — 85025 COMPLETE CBC W/AUTO DIFF WBC: CPT

## 2019-09-06 PROCEDURE — 3331090002 HH PPS REVENUE DEBIT

## 2019-09-06 PROCEDURE — 99284 EMERGENCY DEPT VISIT MOD MDM: CPT

## 2019-09-06 PROCEDURE — 83735 ASSAY OF MAGNESIUM: CPT

## 2019-09-06 PROCEDURE — 82803 BLOOD GASES ANY COMBINATION: CPT

## 2019-09-06 PROCEDURE — 93005 ELECTROCARDIOGRAM TRACING: CPT

## 2019-09-06 PROCEDURE — 3331090001 HH PPS REVENUE CREDIT

## 2019-09-06 PROCEDURE — 82962 GLUCOSE BLOOD TEST: CPT

## 2019-09-06 PROCEDURE — 77030013140 HC MSK NEB VYRM -A

## 2019-09-06 PROCEDURE — 74011636637 HC RX REV CODE- 636/637: Performed by: HOSPITALIST

## 2019-09-06 PROCEDURE — 65660000000 HC RM CCU STEPDOWN

## 2019-09-06 RX ORDER — INSULIN LISPRO 100 [IU]/ML
INJECTION, SOLUTION INTRAVENOUS; SUBCUTANEOUS
Status: DISCONTINUED | OUTPATIENT
Start: 2019-09-06 | End: 2019-09-09 | Stop reason: HOSPADM

## 2019-09-06 RX ORDER — MAGNESIUM SULFATE 100 %
16 CRYSTALS MISCELLANEOUS AS NEEDED
Status: DISCONTINUED | OUTPATIENT
Start: 2019-09-06 | End: 2019-09-09 | Stop reason: HOSPADM

## 2019-09-06 RX ORDER — IPRATROPIUM BROMIDE AND ALBUTEROL SULFATE 2.5; .5 MG/3ML; MG/3ML
3 SOLUTION RESPIRATORY (INHALATION)
Status: COMPLETED | OUTPATIENT
Start: 2019-09-06 | End: 2019-09-06

## 2019-09-06 RX ORDER — ENOXAPARIN SODIUM 100 MG/ML
40 INJECTION SUBCUTANEOUS EVERY 24 HOURS
Status: DISCONTINUED | OUTPATIENT
Start: 2019-09-06 | End: 2019-09-09 | Stop reason: HOSPADM

## 2019-09-06 RX ORDER — IPRATROPIUM BROMIDE AND ALBUTEROL SULFATE 2.5; .5 MG/3ML; MG/3ML
3 SOLUTION RESPIRATORY (INHALATION)
Status: DISCONTINUED | OUTPATIENT
Start: 2019-09-06 | End: 2019-09-09 | Stop reason: HOSPADM

## 2019-09-06 RX ORDER — CHLORTHALIDONE 25 MG/1
25 TABLET ORAL DAILY
Status: DISCONTINUED | OUTPATIENT
Start: 2019-09-07 | End: 2019-09-09 | Stop reason: HOSPADM

## 2019-09-06 RX ORDER — DEXTROSE MONOHYDRATE 100 MG/ML
125-250 INJECTION, SOLUTION INTRAVENOUS AS NEEDED
Status: DISCONTINUED | OUTPATIENT
Start: 2019-09-06 | End: 2019-09-09 | Stop reason: HOSPADM

## 2019-09-06 RX ORDER — TAMSULOSIN HYDROCHLORIDE 0.4 MG/1
0.4 CAPSULE ORAL EVERY EVENING
Status: DISCONTINUED | OUTPATIENT
Start: 2019-09-06 | End: 2019-09-09 | Stop reason: HOSPADM

## 2019-09-06 RX ADMIN — IPRATROPIUM BROMIDE AND ALBUTEROL SULFATE 3 ML: .5; 3 SOLUTION RESPIRATORY (INHALATION) at 19:25

## 2019-09-06 RX ADMIN — IPRATROPIUM BROMIDE AND ALBUTEROL SULFATE 3 ML: .5; 3 SOLUTION RESPIRATORY (INHALATION) at 11:52

## 2019-09-06 RX ADMIN — IPRATROPIUM BROMIDE AND ALBUTEROL SULFATE 3 ML: .5; 3 SOLUTION RESPIRATORY (INHALATION) at 15:24

## 2019-09-06 RX ADMIN — ENOXAPARIN SODIUM 40 MG: 40 INJECTION SUBCUTANEOUS at 15:09

## 2019-09-06 RX ADMIN — WATER 1 G: 1 INJECTION INTRAMUSCULAR; INTRAVENOUS; SUBCUTANEOUS at 15:09

## 2019-09-06 RX ADMIN — AZITHROMYCIN MONOHYDRATE 500 MG: 500 INJECTION, POWDER, LYOPHILIZED, FOR SOLUTION INTRAVENOUS at 15:08

## 2019-09-06 RX ADMIN — IPRATROPIUM BROMIDE AND ALBUTEROL SULFATE 3 ML: .5; 3 SOLUTION RESPIRATORY (INHALATION) at 12:01

## 2019-09-06 RX ADMIN — TAMSULOSIN HYDROCHLORIDE 0.4 MG: 0.4 CAPSULE ORAL at 17:20

## 2019-09-06 RX ADMIN — INSULIN LISPRO 2 UNITS: 100 INJECTION, SOLUTION INTRAVENOUS; SUBCUTANEOUS at 22:35

## 2019-09-06 RX ADMIN — METHYLPREDNISOLONE SODIUM SUCCINATE 40 MG: 40 INJECTION, POWDER, FOR SOLUTION INTRAMUSCULAR; INTRAVENOUS at 15:09

## 2019-09-06 RX ADMIN — METHYLPREDNISOLONE SODIUM SUCCINATE 125 MG: 125 INJECTION, POWDER, FOR SOLUTION INTRAMUSCULAR; INTRAVENOUS at 11:38

## 2019-09-06 RX ADMIN — METHYLPREDNISOLONE SODIUM SUCCINATE 40 MG: 40 INJECTION, POWDER, FOR SOLUTION INTRAMUSCULAR; INTRAVENOUS at 22:35

## 2019-09-06 NOTE — H&P
History & Physical    Patient: Fara Gutierrez MRN: [id number]  CSN: [id number]    YOB: 1945  Age: 66 y.o. Sex: male      DOA: 9/6/2019  Primary Care Provider:  Marcelo Gibson MD      Assessment/Plan     Patient Active Problem List   Diagnosis Code    Hypertension I10    COPD (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 (HCC) J44.9    Chronic renal disease, stage 3, moderately decreased glomerular filtration rate between 30-59 mL/min/1.73 square meter (HCC) N18.3    Asbestosis ([facility] J61    Acute exacerbation of chronic obstructive pulmonary disease (COPD) ([facility] J44.1    Advanced care planning/counseling discussion Z71.89    Debility R53.81    Pneumonia of right lower lobe due to methicillin susceptible Staphylococcus aureus (MSSA) ([facility] J15.211    Paroxysmal atrial fibrillation (HCC) I48.0    Chronic respiratory failure with hypoxia ([facility] J96.11       Admit to telemetry    Chronic respiratory failure - continue with home oxygen at 2L NC    COPD exacerbation - start on Intravenous steroids. Inhaled short acting beta 2 agonist and anticholinergics. Empiric antibiotics. Recent MSSA pneumonia - on chronic azithromycin. CXR with improvement in right lung base opacity improved from previous study. PAF - NSR    HTN - controlled, monitor BP    DVT prophylaxis with lovenox      Estimated length of stay : 2-3 days    CC: SOB       HPI:     Fara Gutierrez is a 66 y.o. male who has past history of chronic respiratory failure, COPD, PAF, HTN, recent admission for MSSA pneumonia presents to ER with concerns of SOB for the past 3 days. It has been getting progressively getting worse. He has been using neb treatment with no help. He denies any fever/chills, chest pain, N/V. He was recently admitted for COPD exacerbation and MSSA pneumonia. He was discharged on long term antibiotic. In ER he received solumedrol and neb treatment and he was still wheezing.     Past Medical History:   Diagnosis Date    Cancer [facility])     prostate    COPD [facility]chronic obstructive pulmonary disease) [address]     Hypertension     Nocturia     Pneumonia     Radiation effect        Past Surgical History:   Procedure Laterality Date    HX HERNIA REPAIR         Family History   Problem Relation Age of Onset    Heart Disease Mother        Social History     Socioeconomic History    Marital status:      Spouse name: Not on file    Number of children: Not on file    Years of education: Not on file    Highest education level: Not on file   Tobacco Use    Smoking status: Former Smoker     Types: Cigarettes    Smokeless tobacco: Never Used   Substance and Sexual Activity    Alcohol use: No       Prior to Admission medications    Medication Sig Start Date End Date Taking? Authorizing Provider   azithromycin (ZITHROMAX) 250 mg tablet Take 1 Tab by mouth daily. 8/23/19  Yes Licha Jackson MD   tamsulosin [facility]) 0.4 mg capsule Take 0.4 mg by mouth every evening. Yes Other, MD Blayne   chlorthalidone (HYGROTEN) 25 mg tablet Take  by mouth daily. Yes Shant, MD Blayne   dextran 70/hypromellose (ARTIFICIAL TEARS, PF, OP) Apply  to eye as needed. Provider, Historical   diphenhydrAMINE (BENADRYL) 25 mg capsule Take 25 mg by mouth nightly as needed for Itching. Provider, Historical   albuterol-ipratropium (DUO-NEB) 2.5 mg-0.5 mg/3 ml nebu 3 mL by Nebulization route every four (4) hours as needed. Patient taking differently: 3 mL by Nebulization route every four (4) hours as needed for Other (Shortness of breath). 2/9/19   Roshni Torres DO   albuterol (PROVENTIL HFA, VENTOLIN HFA, PROAIR HFA) 90 mcg/actuation inhaler Take 2 Puffs by inhalation every four (4) hours as needed for Wheezing or Shortness of Breath. 4/23/18   Tanisha Ridley PA-C   ipratropium (ATROVENT) 0.03 % nasal spray 2 Sprays by Both Nostrils route every twelve (12) hours as needed.     Shant, MD Blayne       Allergies   Allergen Reactions    Aspirin Other (comments)     \"messes my stomach up\"       Review of Systems  Gen: No fever, chills, malaise, weight loss/gain. Heent: No headache, rhinorrhea, epistaxis, ear pain, hearing loss, sinus pain, neck pain/stiffness, sore throat. Heart: No chest pain, palpitations, KUMAR, pnd, or orthopnea. Resp: see above. GI: No nausea, vomiting, diarrhea, constipation, melena or hematochezia. : No urinary obstruction, dysuria or hematuria. Derm: No rash, new skin lesion or pruritis. Musc/skeletal: no bone or joint complains. Vasc: No edema, cyanosis or claudication. Endo: No heat/cold intolerance, no polyuria,polydipsia or polyphagia. Neuro: No unilateral weakness, numbness, tingling. No seizures. Heme: No easy bruising or bleeding. Physical Exam:     Physical Exam:  Visit Vitals  /80 (BP 1 Location: Left arm, BP Patient Position: At rest)   Pulse 99   Temp 98.1 °F (36.7 °C)   Resp 20   Ht 5' 8\" (1.727 m)   Wt 90.6 kg (199 lb 11.8 oz)   SpO2 99%   BMI 30.37 kg/m²    O2 Flow Rate (L/min): 2 l/min O2 Device: Nasal cannula    Temp (24hrs), Av.4 °F (36.9 °C), Min:98.1 °F (36.7 °C), Max:98.7 °F (37.1 °C)    No intake/output data recorded. No intake/output data recorded. General:  Awake, cooperative, no distress. Head:  Normocephalic, without obvious abnormality, atraumatic. Eyes:  Conjunctivae/corneas clear, sclera anicteric, PERRL, EOMs intact. Nose: Nares normal. No drainage or sinus tenderness. Throat: Lips, mucosa, and tongue normal.    Neck: Supple, symmetrical, trachea midline, no adenopathy. Lungs:   Exp wheezes bilaterally. Heart:   S1, S2, no murmur, click, rub or gallop. Abdomen: Soft, non-tender. Bowel sounds normal. No masses,  No organomegaly. Extremities: Extremities normal, atraumatic, no cyanosis or edema. Capillary refill normal.   Pulses: 2+ and symmetric all extremities.    Skin: Skin color pink, turgor normal. No rashes or lesions   Neurologic: CNII-XII intact. No focal motor or sensory deficit.        Labs Reviewed:    CMP:   Lab Results   Component Value Date/Time     (L) 09/06/2019 11:43 AM    K 4.0 09/06/2019 11:43 AM     09/06/2019 11:43 AM    CO2 25 09/06/2019 11:43 AM    AGAP 6 09/06/2019 11:43 AM     (H) 09/06/2019 11:43 AM    BUN 22 (H) 09/06/2019 11:43 AM    CREA 1.28 09/06/2019 11:43 AM    GFRAA >60 09/06/2019 11:43 AM    GFRNA 55 (L) 09/06/2019 11:43 AM    CA 8.7 09/06/2019 11:43 AM    MG 1.8 09/06/2019 11:43 AM    ALB 3.3 (L) 09/06/2019 11:43 AM    TP 6.0 (L) 09/06/2019 11:43 AM    GLOB 2.7 09/06/2019 11:43 AM    AGRAT 1.2 09/06/2019 11:43 AM    SGOT 9 (L) 09/06/2019 11:43 AM    ALT 14 (L) 09/06/2019 11:43 AM     CBC:   Lab Results   Component Value Date/Time    WBC 9.3 09/06/2019 11:43 AM    HGB 11.5 (L) 09/06/2019 11:43 AM    HCT 33.9 (L) 09/06/2019 11:43 AM     09/06/2019 11:43 AM     All Cardiac Markers in the last 24 hours:   Lab Results   Component Value Date/Time    CPK 94 09/06/2019 11:43 AM    CKMB 4.4 (H) 09/06/2019 11:43 AM    CKND1 4.7 (H) 09/06/2019 11:43 AM    TROIQ <0.02 09/06/2019 11:43 AM         Procedures/imaging: see electronic medical records for all procedures/Xrays and details which were not copied into this note but were reviewed prior to creation of Plan        CC: Marcelo Gibson MD

## 2019-09-06 NOTE — ROUTINE PROCESS
Bedside shift change report given to Jaqueline Madden RN (oncoming nurse) by Alyson Russ RN (offgoing nurse). Report included the following information SBAR, Kardex, Intake/Output and MAR.

## 2019-09-06 NOTE — PROGRESS NOTES
Transition Of Care Follow Up    Patient Outreached Attempted. Received patient's voicemail box. Message left requesting call back.

## 2019-09-06 NOTE — ED PROVIDER NOTES
EMERGENCY DEPARTMENT HISTORY AND PHYSICAL EXAM    Date: 9/6/2019  Patient Name: Jefferson Rosales    History of Presenting Illness     Chief Complaint   Patient presents with    Shortness of Breath    Wheezing         History Provided By: Patient and EMS    Additional History (Context):   Jefferson Rosales is a 66 y.o. male with PMHX oxygen dependent COPD, on chronic abx for MSSA PNA presents to the emergency department C/O worsening SOB x 3 days. EMS reports no hypoxia on arrival. Received 2 Duonebs prior to arrival. Patient reports some improvement after treatments. Pt denies CP, fever, changes in cough, abdominal pain, n/v , and any other sxs or complaints. PCP: Mariella Pickett MD    Current Facility-Administered Medications   Medication Dose Route Frequency Provider Last Rate Last Dose    albuterol-ipratropium (DUO-NEB) 2.5 MG-0.5 MG/3 ML  3 mL Nebulization NOW Mery Andrade DO        albuterol-ipratropium (DUO-NEB) 2.5 MG-0.5 MG/3 ML  3 mL Nebulization NOW Mery Andrade DO        methylPREDNISolone (PF) (Solu-MEDROL) injection 125 mg  125 mg IntraVENous NOW Mery Andrade DO         Current Outpatient Medications   Medication Sig Dispense Refill    guaiFENesin ER (MUCINEX) 600 mg ER tablet Take 1 Tab by mouth every twelve (12) hours. 10 Tab 0    predniSONE (STERAPRED) 5 mg dose pack See administration instruction per 5mg dose pack 21 Tab 0    azithromycin (ZITHROMAX) 250 mg tablet Take 1 Tab by mouth daily. 15 Tab 0    diphenhydrAMINE (BENADRYL) 25 mg capsule Take 25 mg by mouth nightly as needed for Itching.  OXYGEN-AIR DELIVERY SYSTEMS 2 L by Nasal route continuous.  fluticasone propionate (FLONASE) 50 mcg/actuation nasal spray 2 Sprays by Both Nostrils route daily.  umeclidinium-vilanterol (ANORO ELLIPTA) 62.5-25 mcg/actuation inhaler Take 1 Puff by inhalation daily.  1 Inhaler 0    apixaban (ELIQUIS) 5 mg tablet Take 1 Tab by mouth two (2) times a day. 60 Tab 0    metoprolol tartrate (LOPRESSOR) 25 mg tablet Take 1 Tab by mouth every twelve (12) hours. 60 Tab 0    albuterol-ipratropium (DUO-NEB) 2.5 mg-0.5 mg/3 ml nebu 3 mL by Nebulization route every four (4) hours as needed. (Patient taking differently: 3 mL by Nebulization route every four (4) hours as needed for Other (Shortness of breath). ) 30 Nebule 0    albuterol (PROVENTIL HFA, VENTOLIN HFA, PROAIR HFA) 90 mcg/actuation inhaler Take 2 Puffs by inhalation every four (4) hours as needed for Wheezing or Shortness of Breath. 1 Inhaler 1    ipratropium (ATROVENT) 0.03 % nasal spray 2 Sprays every twelve (12) hours.  tamsulosin (FLOMAX) 0.4 mg capsule Take 0.4 mg by mouth daily.  chlorthalidone (HYGROTEN) 25 mg tablet Take  by mouth daily. Past History     Past Medical History:  Past Medical History:   Diagnosis Date    Cancer [facility])     prostate    COPD (chronic obstructive pulmonary disease) ([facility] [address]     Hypertension     Pneumonia     Radiation effect        Past Surgical History:  Past Surgical History:   Procedure Laterality Date    HX HERNIA REPAIR         Family History:  Family History   Problem Relation Age of Onset    Heart Disease Mother        Social History:  Social History     Tobacco Use    Smoking status: Former Smoker     Types: Cigarettes    Smokeless tobacco: Never Used   Substance Use Topics    Alcohol use: No    Drug use: Not on file       Allergies: Allergies   Allergen Reactions    Aspirin Other (comments)     \"messes my stomach up\"         Review of Systems   Review of Systems   Constitutional: Negative for chills and fever. HENT: Negative for congestion, ear pain, sinus pain and sore throat. Eyes: Negative for pain and visual disturbance. Respiratory: Negative for cough and shortness of breath. Cardiovascular: Negative for chest pain and leg swelling.    Gastrointestinal: Negative for abdominal pain, constipation, diarrhea, nausea and vomiting. Genitourinary: Negative for dysuria and hematuria. Musculoskeletal: Negative for back pain and neck pain. Skin: Negative for pallor and rash. Neurological: Negative for dizziness, tremors, weakness, light-headedness and headaches. All other systems reviewed and are negative.       Physical Exam     Vitals:    09/06/19 1141   BP: 157/83   Pulse: 100   Resp: 14   Temp: 98.7 °F (37.1 °C)   SpO2: 100%   Weight: 90.6 kg (199 lb 11.8 oz)   Height: 5' 8\" (1.727 m)     Physical Exam    Nursing note and vitals reviewed    Constitutional: Elderly  male, moderate distress, unable to speak in full sentences  Head: Normocephalic, Atraumatic  Eyes: Pupils are equal, round, and reactive to light, EOMI  Neck: Supple, non-tender  Cardiovascular: Tachycardic, no murmurs, rubs, or gallops  Chest: Increased work of breathing, substernal retractions, tachypneic   Lungs: Diminished breath sounds with moderate expiratory wheezes  Abdomen: Soft, non tender, non distended, normoactive bowel sounds  Back: No evidence of trauma or deformity  Extremities: No evidence of trauma or deformity, no LE edema  Skin: Warm and dry, normal cap refill  Neuro: Alert and appropriate, CN intact, normal speech, moving all 4 extremities freely and symmetrically  Psychiatric: Normal mood and affect       Diagnostic Study Results     Labs -     Recent Results (from the past 12 hour(s))   EKG, 12 LEAD, INITIAL    Collection Time: 09/06/19 11:39 AM   Result Value Ref Range    Ventricular Rate 108 BPM    Atrial Rate 108 BPM    P-R Interval 152 ms    QRS Duration 82 ms    Q-T Interval 342 ms    QTC Calculation (Bezet) 458 ms    Calculated P Axis 58 degrees    Calculated R Axis 30 degrees    Calculated T Axis 66 degrees    Diagnosis       Sinus tachycardia with frequent premature ventricular complexes  Otherwise normal ECG  When compared with ECG of 19-AUG-2019 14:43,  premature ventricular complexes are now present         Radiologic Studies -   XR CHEST PORT    (Results Pending)     CT Results  (Last 48 hours)    None        CXR Results  (Last 48 hours)    None            Medical Decision Making   I am the first provider for this patient. I reviewed the vital signs, available nursing notes, past medical history, past surgical history, family history and social history. Vital Signs-Reviewed the patient's vital signs. Pulse Oximetry Analysis - 100% on room air    Cardiac Monitor:  Rate: 105 bpm  Rhythm: Regular    EKG interpretation: (Preliminary)  11:41 AM   Sinus tachycardia with PVCs. 108 bpm.  QTc 458 ms. No acute ST elevation    Records Reviewed: Nursing Notes and Old Medical Records    Provider Notes:   66 y.o. male with PMHX oxygen dependent COPD, on chronic abx for MSSA PNA presents to the emergency department C/O worsening SOB x 3 days. On exam, in moderate distress. Tachypneic with substernal retractions. Diminished breath sounds with expiratory wheezes. Clinical suspicion for COPD exacerbation however will check BMP, chest x-ray. We will also evaluate for ACS and obtain cardiac enzymes. Will give duo nebs, steroid and reassess. Procedures:  Procedures    ED Course:   11:54 AM Initial assessment performed. The patients presenting problems have been discussed, and they are in agreement with the care plan formulated and outlined with them. I have encouraged them to ask questions as they arise throughout their visit. 12:27 PM  Reassessment, patient reports improvement in his breathing however continues to have diminished breath sounds with wheezes. Will admit for COPD exacerbation. Diagnosis and Disposition     12:26 PM  I have spent 40 minutes of critical care time involved in lab review, consultations with specialist, family decision-making, and documentation. During this entire length of time I was immediately available to the patient. Critical Care:   The reason for providing this level of medical care for this critically ill patient was due a critical illness that impaired one or more vital organ systems such that there was a high probability of imminent or life threatening deterioration in the patients condition. This care involved high complexity decision making to assess, manipulate, and support vital system functions, to treat this degreee vital organ system failure and to prevent further life threatening deterioration of the patients condition. Core Measures:  For Hospitalized Patients: Telemetry     1. Hospitalization Decision Time:  The decision to hospitalize the patient was made by Tonio Ervin DO at 12:30 PM on 9/6/2019    2. Aspirin: Aspirin was not given because the patient did not present with a stroke at the time of their Emergency Department evaluation    12:26 PM  Patient is being admitted to the hospital by Shimon Hermosillo MD. The results of their tests and reasons for their admission have been discussed with them and/or available family. They convey agreement and understanding for the need to be admitted and for their admission diagnosis. CONDITIONS ON ADMISSION:  Sepsis is not present at the time of admission. Pneumonia is not present at the time of admission. MRSA is not present at the time of admission. Wound infection is not present at the time of admission. Pressure Ulcer is not present at the time of admission. CLINICAL IMPRESSION:    1. Acute exacerbation of chronic obstructive pulmonary disease ([facility]) ([facility]      ____________________________________     Please note that this dictation was completed with SlapVid, the computer voice recognition software. Quite often unanticipated grammatical, syntax, homophones, and other interpretive errors are inadvertently transcribed by the computer software. Please disregard these errors. Please excuse any errors that have escaped final proofreading.

## 2019-09-06 NOTE — ED TRIAGE NOTES
Patient with complaints of worsening shortness of breath an wheezing for the past 3 days. Patient received AA treatment en route.

## 2019-09-06 NOTE — PROGRESS NOTES
Admission Medication Reconciliation has been performed on this patient  admitted through the ED today consisting of interview of the patient regarding their PTA Home Medication List, Allergies and PMH as well as obtaining outpatient pharmacy information. Chief complaint:   Chief Complaint   Patient presents with    Shortness of Breath    Wheezing       Pt has PMH of   Past Medical History:   Diagnosis Date    Cancer ([facility] [address]     prostate    COPD (chronic obstructive pulmonary disease) ([facility] [address]     Hypertension     Nocturia     Pneumonia     Radiation effect        Allergies consist of: Aspirin    Interviewed patient who was not a very good historian  Patient did not provide a written list of home medications. Medications are managed by: self  Patient's outpatient pharmacy is [facility] was not marked complete and did  require modification. Admission orders have not already been written. Medication Compliance Issues and/or Medication Concerns:   Obtained current RX info from [facility] which concurrs with pt report of an inhaler, an ABX, and 2 pills at night one so he doesn't pee at night and the other for BP. Pt also reports artificial tears and an allergy pill, pink like benadryl.                   Shila Razo [facility], Agustin Hooper    Clinical Pharmacist  [phone]

## 2019-09-07 LAB
ANION GAP SERPL CALC-SCNC: 7 MMOL/L (ref 3–18)
BUN SERPL-MCNC: 25 MG/DL (ref 7–18)
BUN/CREAT SERPL: 21 (ref 12–20)
CALCIUM SERPL-MCNC: 8.4 MG/DL (ref 8.5–10.1)
CHLORIDE SERPL-SCNC: 101 MMOL/L (ref 100–111)
CO2 SERPL-SCNC: 25 MMOL/L (ref 21–32)
CREAT SERPL-MCNC: 1.19 MG/DL (ref 0.6–1.3)
ERYTHROCYTE [DISTWIDTH] IN BLOOD BY AUTOMATED COUNT: 13.7 % (ref 11.6–14.5)
GLUCOSE BLD STRIP.AUTO-MCNC: 134 MG/DL (ref 70–110)
GLUCOSE BLD STRIP.AUTO-MCNC: 146 MG/DL (ref 70–110)
GLUCOSE BLD STRIP.AUTO-MCNC: 152 MG/DL (ref 70–110)
GLUCOSE SERPL-MCNC: 141 MG/DL (ref 74–99)
HCT VFR BLD AUTO: 33 % (ref 36–48)
HGB BLD-MCNC: 11.2 G/DL (ref 13–16)
MCH RBC QN AUTO: 28.9 PG (ref 24–34)
MCHC RBC AUTO-ENTMCNC: 33.9 G/DL (ref 31–37)
MCV RBC AUTO: 85.3 FL (ref 74–97)
PLATELET # BLD AUTO: 207 K/UL (ref 135–420)
PMV BLD AUTO: 9.3 FL (ref 9.2–11.8)
POTASSIUM SERPL-SCNC: 4.5 MMOL/L (ref 3.5–5.5)
RBC # BLD AUTO: 3.87 M/UL (ref 4.7–5.5)
SODIUM SERPL-SCNC: 133 MMOL/L (ref 136–145)
WBC # BLD AUTO: 5.4 K/UL (ref 4.6–13.2)

## 2019-09-07 PROCEDURE — 74011250636 HC RX REV CODE- 250/636: Performed by: HOSPITALIST

## 2019-09-07 PROCEDURE — 85027 COMPLETE CBC AUTOMATED: CPT

## 2019-09-07 PROCEDURE — 36415 COLL VENOUS BLD VENIPUNCTURE: CPT

## 2019-09-07 PROCEDURE — 74011636637 HC RX REV CODE- 636/637: Performed by: HOSPITALIST

## 2019-09-07 PROCEDURE — 77010033678 HC OXYGEN DAILY

## 2019-09-07 PROCEDURE — 82962 GLUCOSE BLOOD TEST: CPT

## 2019-09-07 PROCEDURE — 3331090001 HH PPS REVENUE CREDIT

## 2019-09-07 PROCEDURE — 74011250637 HC RX REV CODE- 250/637: Performed by: HOSPITALIST

## 2019-09-07 PROCEDURE — 65660000000 HC RM CCU STEPDOWN

## 2019-09-07 PROCEDURE — 3331090002 HH PPS REVENUE DEBIT

## 2019-09-07 PROCEDURE — 94640 AIRWAY INHALATION TREATMENT: CPT

## 2019-09-07 PROCEDURE — 80048 BASIC METABOLIC PNL TOTAL CA: CPT

## 2019-09-07 PROCEDURE — 74011000250 HC RX REV CODE- 250: Performed by: HOSPITALIST

## 2019-09-07 PROCEDURE — 94760 N-INVAS EAR/PLS OXIMETRY 1: CPT

## 2019-09-07 RX ORDER — ARFORMOTEROL TARTRATE 15 UG/2ML
15 SOLUTION RESPIRATORY (INHALATION)
Status: DISCONTINUED | OUTPATIENT
Start: 2019-09-07 | End: 2019-09-09 | Stop reason: HOSPADM

## 2019-09-07 RX ORDER — BUDESONIDE 0.5 MG/2ML
500 INHALANT ORAL
Status: DISCONTINUED | OUTPATIENT
Start: 2019-09-07 | End: 2019-09-09 | Stop reason: HOSPADM

## 2019-09-07 RX ORDER — ACETAMINOPHEN 325 MG/1
650 TABLET ORAL
Status: DISCONTINUED | OUTPATIENT
Start: 2019-09-07 | End: 2019-09-09 | Stop reason: HOSPADM

## 2019-09-07 RX ADMIN — CHLORTHALIDONE 25 MG: 25 TABLET ORAL at 10:14

## 2019-09-07 RX ADMIN — ARFORMOTEROL TARTRATE 15 MCG: 15 SOLUTION RESPIRATORY (INHALATION) at 19:44

## 2019-09-07 RX ADMIN — WATER 1 G: 1 INJECTION INTRAMUSCULAR; INTRAVENOUS; SUBCUTANEOUS at 14:19

## 2019-09-07 RX ADMIN — TAMSULOSIN HYDROCHLORIDE 0.4 MG: 0.4 CAPSULE ORAL at 17:49

## 2019-09-07 RX ADMIN — IPRATROPIUM BROMIDE AND ALBUTEROL SULFATE 3 ML: .5; 3 SOLUTION RESPIRATORY (INHALATION) at 07:30

## 2019-09-07 RX ADMIN — ACETAMINOPHEN 650 MG: 325 TABLET ORAL at 11:01

## 2019-09-07 RX ADMIN — AZITHROMYCIN MONOHYDRATE 500 MG: 500 INJECTION, POWDER, LYOPHILIZED, FOR SOLUTION INTRAVENOUS at 14:19

## 2019-09-07 RX ADMIN — IPRATROPIUM BROMIDE AND ALBUTEROL SULFATE 3 ML: .5; 3 SOLUTION RESPIRATORY (INHALATION) at 00:42

## 2019-09-07 RX ADMIN — POLYVINYL ALCOHOL, POVIDONE 1 DROP: 14; 6 SOLUTION/ DROPS OPHTHALMIC at 21:51

## 2019-09-07 RX ADMIN — IPRATROPIUM BROMIDE AND ALBUTEROL SULFATE 3 ML: .5; 3 SOLUTION RESPIRATORY (INHALATION) at 11:03

## 2019-09-07 RX ADMIN — ENOXAPARIN SODIUM 40 MG: 40 INJECTION SUBCUTANEOUS at 14:19

## 2019-09-07 RX ADMIN — INSULIN LISPRO 2 UNITS: 100 INJECTION, SOLUTION INTRAVENOUS; SUBCUTANEOUS at 06:50

## 2019-09-07 RX ADMIN — BUDESONIDE 500 MCG: 0.5 INHALANT RESPIRATORY (INHALATION) at 19:44

## 2019-09-07 RX ADMIN — METHYLPREDNISOLONE SODIUM SUCCINATE 40 MG: 40 INJECTION, POWDER, FOR SOLUTION INTRAMUSCULAR; INTRAVENOUS at 13:29

## 2019-09-07 RX ADMIN — IPRATROPIUM BROMIDE AND ALBUTEROL SULFATE 3 ML: .5; 3 SOLUTION RESPIRATORY (INHALATION) at 23:29

## 2019-09-07 RX ADMIN — METHYLPREDNISOLONE SODIUM SUCCINATE 40 MG: 40 INJECTION, POWDER, FOR SOLUTION INTRAMUSCULAR; INTRAVENOUS at 06:50

## 2019-09-07 RX ADMIN — IPRATROPIUM BROMIDE AND ALBUTEROL SULFATE 3 ML: .5; 3 SOLUTION RESPIRATORY (INHALATION) at 19:44

## 2019-09-07 RX ADMIN — METHYLPREDNISOLONE SODIUM SUCCINATE 40 MG: 40 INJECTION, POWDER, FOR SOLUTION INTRAMUSCULAR; INTRAVENOUS at 21:51

## 2019-09-07 RX ADMIN — IPRATROPIUM BROMIDE AND ALBUTEROL SULFATE 3 ML: .5; 3 SOLUTION RESPIRATORY (INHALATION) at 15:28

## 2019-09-07 NOTE — PROGRESS NOTES
Chart reviewed by CM on call. Met with pt at bedside. States lives with wife. Uses a cane and walker. Is IADLS. Uses home o2. 2L NC provider is J[facility]. CM to continue to follow for dc needs. Care Management Interventions  PCP Verified by CM: Yes  Current Support Network: Lives with Spouse  Confirm Follow Up Transport: Family  Discharge Location  Discharge Placement: Home with family assistance    Reason for Admission:   COPD               RRAT Score:     28           Resources/supports as identified by patient/family:  medicare                 Top Challenges facing patient (as identified by patient/family and CM): Finances/Medication cost?                    Transportation? Drives self              Support system or lack thereof?   wife                     Living arrangements? Lives with wife           Self-care/ADLs/Cognition?             Current Advanced Directive/Advance Care Plan:  full                          Plan for utilizing home health:    TBD                 Transition of Care Plan:       TBD

## 2019-09-07 NOTE — PROGRESS NOTES
Shift uneventful. 0710 Bedside and Verbal shift change report given to Edgardo Alberto RN (oncoming nurse) by Germain Jeronimo RN   (offgoing nurse). Report included the following information SBAR, Kardex, Procedure Summary, Intake/Output, MAR, Recent Results and Med Rec Status.

## 2019-09-07 NOTE — ROUTINE PROCESS
1935 - Bedside and Verbal shift change report given to [address] [place] [address] by Yvette Luevano RN. Report included the following information SBAR, Kardex, OR Summary, Intake/Output and MAR.

## 2019-09-07 NOTE — PROGRESS NOTES
1017 - Patient in bed at this time. A/O x 4. IV to left hand  intact and patent. Patient deneis numbness/tingling. Pedal and radial pulses palpable. Lungs course. Bowel sounds active to all quadrants. Pain 4/10.     1102 - Pain 4/10.  mg tylenol pain medication administered at this time. Patient has been educated on side effects. 1756-Noted patient has red wheel like rash on ankles and on upper arms. Patient reports he has had the ankle  for a few days and the arm rash today and reports he is itchy. 1800-Paged hospitalist to report rash. 1935-Showed patient's ankles and forearms to night nurse Arya Remy RN. No return call from hospitalist. Rash appears to have improved, night nurse to continue to monitor. Shift Summary: Patient's pain well controlled this shift. IV remains intact.

## 2019-09-07 NOTE — PROGRESS NOTES
Hospitalist Progress Note-critical care note     Patient: Nadir Velasquez MRN: [id number]  CSN: [id number]    YOB: 1945  Age: 66 y.o. Sex: male    DOA: 9/6/2019 LOS:  LOS: 1 day            Chief complaint: chronic respiratory failure, copd exacerbation, asbestosis     Assessment/Plan         Hospital Problems  Date Reviewed: 7/6/2019          Codes Class Noted POA    Chronic respiratory failure with hypoxia [facility]) ICD-10-CM: J96.11  ICD-9-CM: 518.83, 799.02  8/19/2019 Yes        Debility ICD-10-CM: R53.81  ICD-9-CM: 799.3  7/8/2019 Yes        * (Principal) Acute exacerbation of chronic obstructive pulmonary disease (COPD) ([facility] [address] ICD-10-CM: J44.1  ICD-9-CM: 491.21  2/8/2019 Yes        Asbestosis ([facility] ICD-10-CM: G89  ICD-9-CM: 501  10/19/2018 Yes        Hypertension ICD-10-CM: D74  ICD-9-CM: 401.9  Unknown Yes              Chronic respiratory failure , asbestosis    continue with home oxygen at 2L NC   F/u with dr. Shu Davis as out -pt      COPD exacerbation   Mild improving, still sob   Continue Intravenous steroids. Breathing tx , will add pulmicort and brovana   Empiric antibiotics.     Recent MSSA pneumonia - on chronic azithromycin. CXR with improvement      PAF - NSR  Rate sell controlled      HTN - controlled, monitor BP    Subjective: sob better , still wheezing   Rn: need tylenol , need pt/ot         Disposition :2-3 days   Review of systems:    General: No fevers or chills. Cardiovascular: No chest pain or pressure. No palpitations. Pulmonary: shortness of breath. Gastrointestinal: No nausea, vomiting.      Vital signs/Intake and Output:  Visit Vitals  /58 (BP 1 Location: Left arm, BP Patient Position: Sitting)   Pulse 77   Temp 98.3 °F (36.8 °C)   Resp 18   Ht 5' 8\" (1.727 m)   Wt 87.1 kg (192 lb)   SpO2 96%   BMI 29.19 kg/m²     Current Shift:  09/07 0701 - 09/07 1900  In: 480 [P.O.:480]  Out: 580 [Urine:480]  Last three shifts:  09/05 1901 - 09/07 0700  In: 240 [P.O.:240]  Out: 1100 [Urine:1100]    Physical Exam:  General: WD, WN. Alert, cooperative, no acute distress    HEENT: NC, Atraumatic. PERRLA, anicteric sclerae. Lungs: CTA Bilaterally. +Wheezing/Rhonchi/Rales. Heart:  Regular  rhythm, + murmur, No Rubs, No Gallops  Abdomen: Soft, Non distended, Non tender.  +Bowel sounds,   Extremities: No c/c/e  Psych:   Not anxious or agitated. Neurologic:  No acute neurological deficit. Labs: Results:       Chemistry Recent Labs     09/07/19 0325 09/06/19  1143   * 119*   * 133*   K 4.5 4.0    102   CO2 25 25   BUN 25* 22*   CREA 1.19 1.28   CA 8.4* 8.7   AGAP 7 6   BUCR 21* 17   AP  --  67   TP  --  6.0*   ALB  --  3.3*   GLOB  --  2.7   AGRAT  --  1.2      CBC w/Diff Recent Labs     09/07/19  0325 09/06/19  1143   WBC 5.4 9.3   RBC 3.87* 3.93*   HGB 11.2* 11.5*   HCT 33.0* 33.9*    211   GRANS  --  80*   LYMPH  --  7*   EOS  --  6*      Cardiac Enzymes Recent Labs     09/06/19  1143   CPK 94   CKND1 4.7*      Coagulation No results for input(s): PTP, INR, APTT in the last 72 hours. No lab exists for component: INREXT, INREXT    Lipid Panel No results found for: CHOL, CHOLPOCT, CHOLX, CHLST, CHOLV, 026894, HDL, LDL, LDLC, DLDLP, 253844, VLDLC, VLDL, TGLX, TRIGL, TRIGP, TGLPOCT, CHHD, CHHDX   BNP No results for input(s): BNPP in the last 72 hours.    Liver Enzymes Recent Labs     09/06/19  1143   TP 6.0*   ALB 3.3*   AP 67   SGOT 9*      Thyroid Studies No results found for: T4, T3U, TSH, TSHEXT, TSHEXT     Procedures/imaging: see electronic medical records for all procedures/Xrays and details which were not copied into this note but were reviewed prior to creation of Kris Lino MD

## 2019-09-07 NOTE — PROGRESS NOTES
Problem: Falls - Risk of  Goal: *Absence of Falls  Description  Document Sarahboni [place] Fall Risk and appropriate interventions in the flowsheet.   Outcome: Progressing Towards Goal  Note:   Fall Risk Interventions:  Mobility Interventions: Patient to call before getting OOB, PT Consult for mobility concerns, PT Consult for assist device competence, Utilize walker, cane, or other assistive device    Mentation Interventions: Adequate sleep, hydration, pain control    Medication Interventions: Evaluate medications/consider consulting pharmacy, Patient to call before getting OOB, Teach patient to arise slowly

## 2019-09-08 LAB
ANION GAP SERPL CALC-SCNC: 9 MMOL/L (ref 3–18)
BUN SERPL-MCNC: 30 MG/DL (ref 7–18)
BUN/CREAT SERPL: 28 (ref 12–20)
CALCIUM SERPL-MCNC: 8.4 MG/DL (ref 8.5–10.1)
CHLORIDE SERPL-SCNC: 104 MMOL/L (ref 100–111)
CO2 SERPL-SCNC: 23 MMOL/L (ref 21–32)
CREAT SERPL-MCNC: 1.09 MG/DL (ref 0.6–1.3)
ERYTHROCYTE [DISTWIDTH] IN BLOOD BY AUTOMATED COUNT: 13.9 % (ref 11.6–14.5)
GLUCOSE BLD STRIP.AUTO-MCNC: 126 MG/DL (ref 70–110)
GLUCOSE BLD STRIP.AUTO-MCNC: 134 MG/DL (ref 70–110)
GLUCOSE BLD STRIP.AUTO-MCNC: 135 MG/DL (ref 70–110)
GLUCOSE BLD STRIP.AUTO-MCNC: 143 MG/DL (ref 70–110)
GLUCOSE SERPL-MCNC: 139 MG/DL (ref 74–99)
HCT VFR BLD AUTO: 31.5 % (ref 36–48)
HGB BLD-MCNC: 10.7 G/DL (ref 13–16)
MCH RBC QN AUTO: 29.1 PG (ref 24–34)
MCHC RBC AUTO-ENTMCNC: 34 G/DL (ref 31–37)
MCV RBC AUTO: 85.6 FL (ref 74–97)
PLATELET # BLD AUTO: 225 K/UL (ref 135–420)
PMV BLD AUTO: 9.2 FL (ref 9.2–11.8)
POTASSIUM SERPL-SCNC: 4.1 MMOL/L (ref 3.5–5.5)
RBC # BLD AUTO: 3.68 M/UL (ref 4.7–5.5)
SODIUM SERPL-SCNC: 136 MMOL/L (ref 136–145)
WBC # BLD AUTO: 9.6 K/UL (ref 4.6–13.2)

## 2019-09-08 PROCEDURE — 3331090001 HH PPS REVENUE CREDIT

## 2019-09-08 PROCEDURE — 3331090002 HH PPS REVENUE DEBIT

## 2019-09-08 PROCEDURE — 74011250636 HC RX REV CODE- 250/636: Performed by: HOSPITALIST

## 2019-09-08 PROCEDURE — 65660000000 HC RM CCU STEPDOWN

## 2019-09-08 PROCEDURE — 85027 COMPLETE CBC AUTOMATED: CPT

## 2019-09-08 PROCEDURE — 74011250637 HC RX REV CODE- 250/637: Performed by: HOSPITALIST

## 2019-09-08 PROCEDURE — 94640 AIRWAY INHALATION TREATMENT: CPT

## 2019-09-08 PROCEDURE — 74011000250 HC RX REV CODE- 250: Performed by: HOSPITALIST

## 2019-09-08 PROCEDURE — 94760 N-INVAS EAR/PLS OXIMETRY 1: CPT

## 2019-09-08 PROCEDURE — 74011250636 HC RX REV CODE- 250/636: Performed by: FAMILY MEDICINE

## 2019-09-08 PROCEDURE — 36415 COLL VENOUS BLD VENIPUNCTURE: CPT

## 2019-09-08 PROCEDURE — 77010033678 HC OXYGEN DAILY

## 2019-09-08 PROCEDURE — 80048 BASIC METABOLIC PNL TOTAL CA: CPT

## 2019-09-08 PROCEDURE — 82962 GLUCOSE BLOOD TEST: CPT

## 2019-09-08 RX ORDER — FLUTICASONE PROPIONATE 50 MCG
2 SPRAY, SUSPENSION (ML) NASAL DAILY
Status: DISCONTINUED | OUTPATIENT
Start: 2019-09-08 | End: 2019-09-09 | Stop reason: HOSPADM

## 2019-09-08 RX ORDER — FLUTICASONE PROPIONATE 50 MCG
2 SPRAY, SUSPENSION (ML) NASAL DAILY
Status: DISCONTINUED | OUTPATIENT
Start: 2019-09-09 | End: 2019-09-08

## 2019-09-08 RX ADMIN — ARFORMOTEROL TARTRATE 15 MCG: 15 SOLUTION RESPIRATORY (INHALATION) at 07:08

## 2019-09-08 RX ADMIN — BUDESONIDE 500 MCG: 0.5 INHALANT RESPIRATORY (INHALATION) at 20:31

## 2019-09-08 RX ADMIN — METHYLPREDNISOLONE SODIUM SUCCINATE 40 MG: 40 INJECTION, POWDER, FOR SOLUTION INTRAMUSCULAR; INTRAVENOUS at 20:18

## 2019-09-08 RX ADMIN — METHYLPREDNISOLONE SODIUM SUCCINATE 40 MG: 40 INJECTION, POWDER, FOR SOLUTION INTRAMUSCULAR; INTRAVENOUS at 06:22

## 2019-09-08 RX ADMIN — TAMSULOSIN HYDROCHLORIDE 0.4 MG: 0.4 CAPSULE ORAL at 17:22

## 2019-09-08 RX ADMIN — AZITHROMYCIN MONOHYDRATE 500 MG: 500 INJECTION, POWDER, LYOPHILIZED, FOR SOLUTION INTRAVENOUS at 11:15

## 2019-09-08 RX ADMIN — WATER 1 G: 1 INJECTION INTRAMUSCULAR; INTRAVENOUS; SUBCUTANEOUS at 14:14

## 2019-09-08 RX ADMIN — IPRATROPIUM BROMIDE AND ALBUTEROL SULFATE 3 ML: .5; 3 SOLUTION RESPIRATORY (INHALATION) at 07:08

## 2019-09-08 RX ADMIN — IPRATROPIUM BROMIDE AND ALBUTEROL SULFATE 3 ML: .5; 3 SOLUTION RESPIRATORY (INHALATION) at 11:02

## 2019-09-08 RX ADMIN — ENOXAPARIN SODIUM 40 MG: 40 INJECTION SUBCUTANEOUS at 14:15

## 2019-09-08 RX ADMIN — IPRATROPIUM BROMIDE AND ALBUTEROL SULFATE 3 ML: .5; 3 SOLUTION RESPIRATORY (INHALATION) at 15:14

## 2019-09-08 RX ADMIN — FLUTICASONE PROPIONATE 2 SPRAY: 50 SPRAY, METERED NASAL at 15:31

## 2019-09-08 RX ADMIN — CHLORTHALIDONE 25 MG: 25 TABLET ORAL at 08:23

## 2019-09-08 RX ADMIN — IPRATROPIUM BROMIDE AND ALBUTEROL SULFATE 3 ML: .5; 3 SOLUTION RESPIRATORY (INHALATION) at 20:31

## 2019-09-08 RX ADMIN — BUDESONIDE 500 MCG: 0.5 INHALANT RESPIRATORY (INHALATION) at 07:08

## 2019-09-08 RX ADMIN — ARFORMOTEROL TARTRATE 15 MCG: 15 SOLUTION RESPIRATORY (INHALATION) at 20:31

## 2019-09-08 NOTE — PROGRESS NOTES
7164  Assumed care of pt at this time. Assessment complete. Pt alert and oriented x 4 sitting up to edge of bed. Denies SOB and chest pain. Pt lungs diminished  Bilaterally. On 3L of O2 NC Cap refill  less than 3 seconds. Pt denies numbness and tingling to all extremities. Stated pain 0/10. Pt has 20 G IV to L forearm. Pt has no dressing . Bruising to upper and lower extremities Rash to ankles and feet. On lovonox for VTE. Call light and possessions within reach. Bed in low position. Will continue to monitor. Fall risk arm band in place    1212  Pt lying in bed watching tv no compliants at this time. Possessions and call bell within reach. Bed locked and in low position     1300  After azythromycin completed pt denies any itching or discomfort. Noted redness to hands But rash to arms and legs improving    1832  Pt reading NSR on box 17 per monitor tech at this time    Shift summary   Pt is alert and oriented x 4. Pt had uneventful shift. Pt ambulating and voiding sufficient amounts. Pain to be controlled by PRN medication. Plan to cont. IV abx and resp treatments.  Pt wears 2L O2 at home

## 2019-09-08 NOTE — PROGRESS NOTES
Bedside and Verbal shift change report received from GREGORIO Braden RN (offgoing nurse). Report included the following information SBAR, Kardex, Intake/Output, MAR and Recent Results. 2055 Shift assessment completed, see EMR. Patient on 3l NC, alert and orient x3,  Lung sound clear diminshed on auscultation. Patient has active bowel sound. 2150 Notified Dr. Evy Chester that patient has a rash to bilateral UE/LE that occurred  something today with no respiratory issue and patient received azithromycin and rocephin around the same time today. Dr. Evy Chester re timed antibiotics 3 hrs apart. 2355 Reassessment completed, see EMR    0505 Reassessment completed, see EMR    Shift Summary:    Patient mourned continuously by denies pain when asked. Bedside and Verbal shift change report given to CAITLIN Meng (oncoming nurse)  Report included the following information SBAR, Kardex, Intake/Output, MAR and Recent Results.

## 2019-09-08 NOTE — PROGRESS NOTES
RN paged that pt had a rash for most of the day today. His abx were both timed for 1500. Rash is mostly on extremities and no SOB or other symptoms. I retimed azithromycin for noon so it will be easier to tell if one of the abx is causing it. Cont to monitor.

## 2019-09-08 NOTE — PROGRESS NOTES
Hospitalist Progress Note-critical care note     Patient: Gautam Saenz MRN: [id number]  CSN: [id number]    YOB: 1945  Age: 66 y.o. Sex: male    DOA: 9/6/2019 LOS:  LOS: 2 days            Chief complaint: chronic respiratory failure, copd exacerbation, asbestosis     Assessment/Plan         Hospital Problems  Date Reviewed: 7/6/2019          Codes Class Noted POA    Chronic respiratory failure with hypoxia [facility]) ICD-10-CM: J96.11  ICD-9-CM: 518.83, 799.02  8/19/2019 Yes        Debility ICD-10-CM: R53.81  ICD-9-CM: 799.3  7/8/2019 Yes        * (Principal) Acute exacerbation of chronic obstructive pulmonary disease (COPD) ([facility] [address] ICD-10-CM: J44.1  ICD-9-CM: 491.21  2/8/2019 Yes        [facility]best[facility] ICD-10-CM: X19  ICD-9-CM: 501  10/19/2018 Yes        Hypertension ICD-10-CM: Y23  ICD-9-CM: 401.9  Unknown Yes              Chronic respiratory failure , asbestosis    stable continue with home oxygen at 2L NC   F/u with dr. Nickolas Cox as out -pt      COPD exacerbation   Improving from yesterday    Continue Intravenous steroids will tapering   . Breathing tx , cotninue  pulmicort and brovana   Empiric antibiotics.     Recent MSSA pneumonia - on chronic azithromycin   CXR with improvement      PAF - NSR  Rate sell controlled      HTN - controlled, monitor BP    Subjective: sob better , wheezing better     He needs close to f/u with pulm as out-pt, may be benefit from low dose  steroid       Disposition :1-2 days   Review of systems:    General: No fevers or chills. Cardiovascular: No chest pain or pressure. No palpitations. Pulmonary: shortness of breath better   Gastrointestinal: No nausea, vomiting.      Vital signs/Intake and Output:  Visit Vitals  /70 (BP 1 Location: Left arm, BP Patient Position: Supine)   Pulse 77   Temp 97.3 °F (36.3 °C)   Resp 20   Ht 5' 8\" (1.727 m)   Wt 87.1 kg (192 lb)   SpO2 98%   BMI 29.19 kg/m²     Current Shift:  09/08 0701 - 09/08 1900  In: 240 [P.O.:240]  Out: - Last three shifts:  09/06 1901 - 09/08 0700  In: 480 [P.O.:480]  Out: 3250 [Urine:3250]    Physical Exam:  General: WD, WN. Alert, cooperative, no acute distress    HEENT: NC, Atraumatic. PERRLA, anicteric sclerae. Lungs: CTA Bilaterally. no Wheezing/Rhonchi/Rales. Heart:  Regular  rhythm, + murmur, No Rubs, No Gallops  Abdomen: Soft, Non distended, Non tender.  +Bowel sounds,   Extremities: No c/c/e  Psych:   Not anxious or agitated. Neurologic:  No acute neurological deficit. Labs: Results:       Chemistry Recent Labs     09/08/19 0530 09/07/19 0325 09/06/19  1143   * 141* 119*    133* 133*   K 4.1 4.5 4.0    101 102   CO2 23 25 25   BUN 30* 25* 22*   CREA 1.09 1.19 1.28   CA 8.4* 8.4* 8.7   AGAP 9 7 6   BUCR 28* 21* 17   AP  --   --  67   TP  --   --  6.0*   ALB  --   --  3.3*   GLOB  --   --  2.7   AGRAT  --   --  1.2      CBC w/Diff Recent Labs     09/08/19 0530 09/07/19 0325 09/06/19  1143   WBC 9.6 5.4 9.3   RBC 3.68* 3.87* 3.93*   HGB 10.7* 11.2* 11.5*   HCT 31.5* 33.0* 33.9*    207 211   GRANS  --   --  80*   LYMPH  --   --  7*   EOS  --   --  6*      Cardiac Enzymes Recent Labs     09/06/19  1143   CPK 94   CKND1 4.7*      Coagulation No results for input(s): PTP, INR, APTT in the last 72 hours. No lab exists for component: INREXT, INREXT    Lipid Panel No results found for: CHOL, CHOLPOCT, CHOLX, CHLST, CHOLV, 884731, HDL, LDL, LDLC, DLDLP, 901003, VLDLC, VLDL, TGLX, TRIGL, TRIGP, TGLPOCT, CHHD, CHHDX   BNP No results for input(s): BNPP in the last 72 hours.    Liver Enzymes Recent Labs     09/06/19  1143   TP 6.0*   ALB 3.3*   AP 67   SGOT 9*      Thyroid Studies No results found for: T4, T3U, TSH, TSHEXT, TSHEXT     Procedures/imaging: see electronic medical records for all procedures/Xrays and details which were not copied into this note but were reviewed prior to creation of Chrystal Zheng MD

## 2019-09-08 NOTE — PROGRESS NOTES
NUTRITION SCREENING    Recommendations: Continue to monitor blood glucose and Consistent Carbohydrate diet    RD ASSESSMENT/PLAN:     Diet:  Consistent Carbohydrate Height: 5' 8\" (172.7 cm)     Food Allergies: NKFA  Weight: 87.1 kg (192 lb)    PO Intake:  Patient Vitals for the past 100 hrs:   % Diet Eaten   09/08/19 0827 100 %   09/07/19 1200 100 %   09/07/19 0800 100 %   09/06/19 1700 100 %      BMI: 29.2 kg/m^2 is  overweight (25.0%-29.9% BMI)      PMH: COPD, HTN, prostate ca, chronic respiratory failure    Current Hospital Problems: acute exacerbation of COPD/asbestosis, physical debility, recent pna   Pt with very good PO intake since adm, no h/o EN/PN PTA noted. Glucose mildly elevated likely secondary to steroid use, taking sliding scale insulin. Nutrition intervention not currently indicated. Pt is not at nutritional risk at this time. Will rescreen per policy.      REASON FOR ASSESSMENT:   [x]  RN Referral:    [] MST score >/=2  Malnutrition Screening Tool (MST):  Recently Lost Weight Without Trying: No     Eating Poorly Due to Decreased Appetite: No  MST Score: 0    [x] Enteral/Parenteral Nutrition PTA   [] Pregnant (Not in Labor):  [] Gestational DM     [] Multigestation   [] Pressure Ulcer/Wound Care needs      Chiki Calderon RD  Pager: [phone]

## 2019-09-08 NOTE — ROUTINE PROCESS
Bedside and Verbal shift change report given to Ino Calderon RN (oncoming nurse) by Debbie Gordillo RN (offgoing nurse). Report included the following information SBAR, Kardex, MAR and Recent Results.

## 2019-09-09 ENCOUNTER — HOME CARE VISIT (OUTPATIENT)
Dept: HOME HEALTH SERVICES | Facility: HOME HEALTH | Age: 74
End: 2019-09-09

## 2019-09-09 ENCOUNTER — HOME CARE VISIT (OUTPATIENT)
Dept: HOME HEALTH SERVICES | Facility: HOME HEALTH | Age: 74
End: 2019-09-09
Payer: MEDICARE

## 2019-09-09 ENCOUNTER — HOME HEALTH ADMISSION (OUTPATIENT)
Dept: HOME HEALTH SERVICES | Facility: HOME HEALTH | Age: 74
End: 2019-09-09

## 2019-09-09 VITALS
OXYGEN SATURATION: 97 % | BODY MASS INDEX: 29.34 KG/M2 | HEART RATE: 84 BPM | WEIGHT: 193.56 LBS | HEIGHT: 68 IN | TEMPERATURE: 98 F | DIASTOLIC BLOOD PRESSURE: 62 MMHG | RESPIRATION RATE: 18 BRPM | SYSTOLIC BLOOD PRESSURE: 145 MMHG

## 2019-09-09 LAB
ANION GAP SERPL CALC-SCNC: 9 MMOL/L (ref 3–18)
BUN SERPL-MCNC: 32 MG/DL (ref 7–18)
BUN/CREAT SERPL: 28 (ref 12–20)
CALCIUM SERPL-MCNC: 7.9 MG/DL (ref 8.5–10.1)
CHLORIDE SERPL-SCNC: 105 MMOL/L (ref 100–111)
CO2 SERPL-SCNC: 23 MMOL/L (ref 21–32)
CREAT SERPL-MCNC: 1.15 MG/DL (ref 0.6–1.3)
ERYTHROCYTE [DISTWIDTH] IN BLOOD BY AUTOMATED COUNT: 14.1 % (ref 11.6–14.5)
GLUCOSE BLD STRIP.AUTO-MCNC: 110 MG/DL (ref 70–110)
GLUCOSE BLD STRIP.AUTO-MCNC: 128 MG/DL (ref 70–110)
GLUCOSE SERPL-MCNC: 136 MG/DL (ref 74–99)
HCT VFR BLD AUTO: 31.9 % (ref 36–48)
HGB BLD-MCNC: 10.6 G/DL (ref 13–16)
MCH RBC QN AUTO: 29.2 PG (ref 24–34)
MCHC RBC AUTO-ENTMCNC: 33.2 G/DL (ref 31–37)
MCV RBC AUTO: 87.9 FL (ref 74–97)
PLATELET # BLD AUTO: 230 K/UL (ref 135–420)
PMV BLD AUTO: 9.3 FL (ref 9.2–11.8)
POTASSIUM SERPL-SCNC: 4.3 MMOL/L (ref 3.5–5.5)
RBC # BLD AUTO: 3.63 M/UL (ref 4.7–5.5)
SODIUM SERPL-SCNC: 137 MMOL/L (ref 136–145)
WBC # BLD AUTO: 8.4 K/UL (ref 4.6–13.2)

## 2019-09-09 PROCEDURE — 85027 COMPLETE CBC AUTOMATED: CPT

## 2019-09-09 PROCEDURE — 77010033678 HC OXYGEN DAILY

## 2019-09-09 PROCEDURE — 74011250637 HC RX REV CODE- 250/637: Performed by: HOSPITALIST

## 2019-09-09 PROCEDURE — 74011250636 HC RX REV CODE- 250/636: Performed by: HOSPITALIST

## 2019-09-09 PROCEDURE — 82962 GLUCOSE BLOOD TEST: CPT

## 2019-09-09 PROCEDURE — 80048 BASIC METABOLIC PNL TOTAL CA: CPT

## 2019-09-09 PROCEDURE — 74011000250 HC RX REV CODE- 250: Performed by: HOSPITALIST

## 2019-09-09 PROCEDURE — 94760 N-INVAS EAR/PLS OXIMETRY 1: CPT

## 2019-09-09 PROCEDURE — 94640 AIRWAY INHALATION TREATMENT: CPT

## 2019-09-09 PROCEDURE — 36415 COLL VENOUS BLD VENIPUNCTURE: CPT

## 2019-09-09 PROCEDURE — 74011250636 HC RX REV CODE- 250/636: Performed by: FAMILY MEDICINE

## 2019-09-09 RX ORDER — PREDNISONE 10 MG/1
TABLET ORAL
Qty: 42 TAB | Refills: 0 | Status: SHIPPED | OUTPATIENT
Start: 2019-09-09 | End: 2019-10-22

## 2019-09-09 RX ADMIN — IPRATROPIUM BROMIDE AND ALBUTEROL SULFATE 3 ML: .5; 3 SOLUTION RESPIRATORY (INHALATION) at 07:15

## 2019-09-09 RX ADMIN — BUDESONIDE 500 MCG: 0.5 INHALANT RESPIRATORY (INHALATION) at 07:15

## 2019-09-09 RX ADMIN — CHLORTHALIDONE 25 MG: 25 TABLET ORAL at 08:00

## 2019-09-09 RX ADMIN — ARFORMOTEROL TARTRATE 15 MCG: 15 SOLUTION RESPIRATORY (INHALATION) at 07:15

## 2019-09-09 RX ADMIN — AZITHROMYCIN MONOHYDRATE 500 MG: 500 INJECTION, POWDER, LYOPHILIZED, FOR SOLUTION INTRAVENOUS at 13:41

## 2019-09-09 RX ADMIN — METHYLPREDNISOLONE SODIUM SUCCINATE 40 MG: 40 INJECTION, POWDER, FOR SOLUTION INTRAMUSCULAR; INTRAVENOUS at 06:28

## 2019-09-09 RX ADMIN — FLUTICASONE PROPIONATE 2 SPRAY: 50 SPRAY, METERED NASAL at 10:06

## 2019-09-09 RX ADMIN — IPRATROPIUM BROMIDE AND ALBUTEROL SULFATE 3 ML: .5; 3 SOLUTION RESPIRATORY (INHALATION) at 00:35

## 2019-09-09 RX ADMIN — IPRATROPIUM BROMIDE AND ALBUTEROL SULFATE 3 ML: .5; 3 SOLUTION RESPIRATORY (INHALATION) at 11:32

## 2019-09-09 NOTE — DISCHARGE INSTRUCTIONS
Patient Education        Learning About Asbestosis  What is asbestosis? Asbestosis is a long-term (chronic) lung disease that can get worse over time. It is caused by breathing tiny asbestos fibers into the lungs. The fibers scar the inside of the lungs, which makes it hard to breathe. Many years may pass between the time you are exposed to the fibers and when you feel the effects of the disease. The amount of time can depend on how long the exposure was and how many fibers you breathed in. Smoking makes the disease get worse faster and increases the chances of it leading to lung cancer. What causes asbestosis? Asbestos is a material that was commonly used for insulating and soundproofing older homes and buildings. It was also used as a material in manufacturing. Many people have been exposed to asbestos while working in mining, construction, and the [employer], and by removing asbestos from old buildings. Asbestos can crumble and send fine fibers into the air. When you breathe in the fibers, they can irritate your lungs. Over time, your lungs get scarred, which makes the lungs less flexible for breathing. What are the symptoms? Symptoms may include:  · A dry cough that doesn't go away. · Shortness of breath that gets worse when you exercise or exert yourself. · Loss of appetite. · Weight loss. · Pain in the chest when you take a deep breath. How is asbestosis diagnosed? Tests to diagnose asbestosis include:  · A detailed medical history to identify any exposure you have had to asbestos. Your doctor may ask questions about your past homes, workplaces, jobs, and hobbies. · Lung function tests. These tests measure how much air you can breathe into your lungs and how quickly you can breathe it out. They also measure how much oxygen enters your bloodstream through the lungs. · X-rays or a CT scan of your lungs. · Examination of sputum (mucus) under a microscope. · A lung biopsy.  In this test, a small amount of tissue is removed from the lung and looked at under a microscope. How is it treated? Asbestosis can't be cured, but it can be treated. The treatment is to help you feel better and keep the disease from getting worse. Your doctor may:  · Give you oxygen to help you breathe easier. · Give you medicine to loosen thick mucus in your lungs. Caring for yourself at home  You can take these steps to help yourself:  · Don't smoke, and don't allow smoking in your house or car. If people who smoke live in or visit your home, ask them to smoke outside. · Get vaccinated against flu and pneumonia. · Avoid any more exposure to asbestos in your home or workplace. Follow-up care is a key part of your treatment and safety. Be sure to make and go to all appointments, and call your doctor if you are having problems. It's also a good idea to know your test results and keep a list of the medicines you take. Where can you learn more? Go to http://ifeoma-jana.info/. Enter I412 in the search box to learn more about \"Learning About Asbestosis. \"  Current as of: September 5, 2018  Content Version: 12.1  © 1615-6482 [employer]. Care instructions adapted under license by [employer] (which disclaims liability or warranty for this information). If you have questions about a medical condition or this instruction, always ask your healthcare professional. [place] [address] any warranty or liability for your use of this information. Patient Education        Learning About COPD Triggers  What are triggers? When you have COPD (chronic obstructive pulmonary disease), certain things can make your symptoms worse. These are called triggers. They include:  · Cigarette smoke or air pollution. · Illnesses like colds, flu, or pneumonia. · Cleaning supplies or other chemicals. · Gases, particles, or fumes from wood or kerosene home heaters.   Not all people have the same triggers. What may cause symptoms in one person may not be a problem for another person. How do triggers affect COPD? Triggers can make it harder for your lungs to work as they should and can lead to sudden difficulty breathing and other symptoms. When you are around a trigger, a COPD flare-up is more likely. If your symptoms are severe, you may need emergency treatment or have to go to the hospital for treatment. If you know what your triggers are and can avoid them, you can reduce how often you have flare-ups and how much COPD affects your life. It's also important to be active and to take your daily medicines as prescribed. This helps prevent flare-ups too. What can you do to avoid triggers? The first thing is to know your triggers. When you are having symptoms, note the things around you that might be causing them. Then look for patterns in what may be triggering your symptoms. When you have your list of possible triggers, work with your doctor to find ways to avoid them. Here are some ways to avoid a few common triggers. · Do not smoke or allow others to smoke around you. If you need help quitting, talk to your doctor about stop-smoking programs and medicines. These can increase your chances of quitting for good. · If there is a lot of pollution, pollen, or dust outside, stay at home and keep your windows closed. Use an air conditioner or air filter in your home. Check your local weather report or newspaper for air quality and pollen reports. · Get a flu vaccine every year. Talk to your doctor about getting a pneumococcal shot. Wash your hands often to prevent infections. How can you manage a flare-up? Do not panic if you start to have a COPD flare-up. · If you have a COPD action plan, follow the plan. In general:  ? Use your quick-relief inhaler as directed by your doctor. If your symptoms do not get better after you use your medicine, have someone take you to the emergency room.  Call an ambulance if needed. ? Use a spacer with your metered-dose inhaler (MDI). If you have a nebulizer for inhaled medicine, use it. A spacer or nebulizer may help get more medicine to your lungs. ? If your doctor has given you other inhaled medicines or steroid pills, take them as directed. ? If your doctor has given you a prescription for an antibiotic, fill it if you need to.  ? Call your doctor if you have to use your antibiotic or steroid pills. Where can you learn more? Go to http://ifeoma-jana.info/. Enter H405 in the search box to learn more about \"Learning About COPD Triggers. \"  Current as of: September 5, 2018  Content Version: 12.1  © 1219-8366 [employer]. Care instructions adapted under license by [employer] (which disclaims liability or warranty for this information). If you have questions about a medical condition or this instruction, always ask your healthcare professional. [place] [address] any warranty or liability for your use of this information.

## 2019-09-09 NOTE — PROGRESS NOTES
Problem: Falls - Risk of  Goal: *Absence of Falls  Description  Document Anita Rankin Fall Risk and appropriate interventions in the flowsheet.   Outcome: Progressing Towards Goal  Note:   Fall Risk Interventions:  Mobility Interventions: Patient to call before getting OOB    Mentation Interventions: Adequate sleep, hydration, pain control, Door open when patient unattended, More frequent rounding    Medication Interventions: Patient to call before getting OOB    Elimination Interventions: Call light in reach              Problem: Patient Education: Go to Patient Education Activity  Goal: Patient/Family Education  Outcome: Progressing Towards Goal     Problem: Pain  Goal: *Control of Pain  Outcome: Progressing Towards Goal  Goal: *PALLIATIVE CARE:  Alleviation of Pain  Outcome: Progressing Towards Goal     Problem: Patient Education: Go to Patient Education Activity  Goal: Patient/Family Education  Outcome: Progressing Towards Goal     Problem: Chronic Obstructive Pulmonary Disease (COPD)  Goal: *Oxygen saturation during activity within specified parameters  Outcome: Progressing Towards Goal  Goal: *Able to remain out of bed as prescribed  Outcome: Progressing Towards Goal  Goal: *Absence of hypoxia  Outcome: Progressing Towards Goal  Goal: *Optimize nutritional status  Outcome: Progressing Towards Goal     Problem: Patient Education: Go to Patient Education Activity  Goal: Patient/Family Education  Outcome: Progressing Towards Goal

## 2019-09-09 NOTE — PROGRESS NOTES
Transiton of care: anticipate d/c home today  Met with patient and Dr. Justin Marie at bedside. Patient lives with wife. Patient has Dr. Karl Donohue for pcp. He needs pcp follow up. Patient use a cane at home. He has home oxygen he gets from [place]. States his wife will drive him home from hospital. Patient reports he is open to have [place] [id number] for [facility] for follow up referral sent. . Patient states he is IADL's. He denies othr needs from cm at this time. States his wife will pick him up in a about an hour  Care Management Interventions  PCP Verified by CM:  Yes  Transition of Care Consult (CM Consult): Home Health(h2h only)  JONAH: Yes  Physical Therapy Consult: No  Occupational Therapy Consult: No  Current Support Network: Lives with Spouse  Confirm Follow Up Transport: Family  Plan discussed with Pt/Family/Caregiver: Yes  Freedom of Choice Offered: Yes  Discharge Location  Discharge Placement: Home with home health(H2H only)

## 2019-09-09 NOTE — PROGRESS NOTES
8642 - Bedside and verbal shift change report received from Josephine Tejada RN to Jesusita Mortimer, RN.    1600- Dual AVS verified with Jt Freeman RN.   AVS reviewed by MAURICIO Meza RN.

## 2019-09-09 NOTE — ROUTINE PROCESS
Bedside and Verbal shift change report given to Nicholas rn/ Kang Lopez RN (oncoming nurse) by Vickey Wise RN (offgoing nurse). Report included the following information SBAR and Kardex.

## 2019-09-09 NOTE — PROGRESS NOTES
Discharge instructions reviewed with the patient. Patient verbalized understanding and verified by teach back. All questions answered. IV discontinued, no redness, swelling or pain noted. Patient awaiting wife for transportation home, with an ETA of 20 mins. Patient armband removed and shredded.

## 2019-09-09 NOTE — DISCHARGE SUMMARY
Discharge Summary    Patient: Stuart Christiansen MRN: [id number]  CSN: [id number]    YOB: 1945  Age: 66 y.o.   Sex: male    DOA: 9/6/2019 LOS:  LOS: 3 days   Discharge Date: 9/9/2019     Primary Care Provider:  Lilian Banks MD    Admission Diagnoses: COPD exacerbation [facility]) [J44.1]    Discharge Diagnoses:    Problem List as of 9/9/2019 Date Reviewed: 7/6/2019          Codes Class Noted - Resolved    Pneumonia of right lower lobe due to methicillin susceptible Staphylococcus aureus (MSSA) ([facility] ICD-10-CM: J15.211  ICD-9-CM: 482.41  8/19/2019 - Present        Paroxysmal atrial fibrillation ([facility] ICD-10-CM: I48.0  ICD-9-CM: 427.31  8/19/2019 - Present        Chronic respiratory failure with hypoxia ([facility] ICD-10-CM: J96.11  ICD-9-CM: 518.83, 799.02  8/19/2019 - Present        Advanced care planning/counseling discussion ICD-10-CM: Z71.89  ICD-9-CM: V65.49  7/8/2019 - Present        Debility ICD-10-CM: R53.81  ICD-9-CM: 799.3  7/8/2019 - Present        * (Principal) Acute exacerbation of chronic obstructive pulmonary disease (COPD) ([facility] ICD-10-CM: J44.1  ICD-9-CM: 491.21  2/8/2019 - Present        Asbestosis ([facility] ICD-10-CM: K72  ICD-9-CM: 146  10/19/2018 - Present        Chronic renal disease, stage 3, moderately decreased glomerular filtration rate between 30-59 mL/min/1.73 square meter ([facility] ICD-10-CM: N18.3  ICD-9-CM: 585.3  7/20/2018 - Present        COPD (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 ([facility] ICD-10-CM: J44.9  ICD-9-CM: 491.20  4/20/2018 - Present        Hypertension ICD-10-CM: I10  ICD-9-CM: 401.9  Unknown - Present        RESOLVED: COPD with asthma and status asthmaticus ([facility] ICD-10-CM: J44.9, J45.902  ICD-9-CM: 493.21  7/20/2018 - 2/8/2019        RESOLVED: Status asthmaticus with COPD (chronic obstructive pulmonary disease) ([facility] ICD-10-CM: J44.9, J45.902  ICD-9-CM: 493.21  4/20/2018 - 2/8/2019        RESOLVED: PVC's (premature ventricular contractions) ICD-10-CM: I49.3  ICD-9-CM: 427.69  4/20/2018 - 2/8/2019        RESOLVED: Acute respiratory failure ([facility] ICD-10-CM: J96.00  ICD-9-CM: 518.81  10/2/2014 - 3/18/2017        RESOLVED: URIEL (acute kidney injury) ([facility].) ICD-10-CM: N17.9  ICD-9-CM: 584.9  10/2/2014 - 2/8/2019        RESOLVED: Pneumonia ICD-10-CM: J18.9  ICD-9-CM: 710  10/2/2014 - 3/18/2017              Discharge Medications:     Discharge Medication List as of 9/9/2019  1:35 PM      START taking these medications    Details   predniSONE (DELTASONE) 10 mg tablet Prednisone 10mg p.o.  6 tabs daily for 2 days then drop to   5 tabs daily for 2 days then drop to   4 tabs daily for 2 days then drop to   3 tabs daily for 2 days then drop to  2 tabs daily for 2 days then drop to   1 tab daily for 2 days then stop. D ispense 42, Print, Disp-42 Tab, R-0         CONTINUE these medications which have NOT CHANGED    Details   azithromycin (ZITHROMAX) 250 mg tablet Take 1 Tab by mouth daily. , Normal, Disp-15 Tab, R-0      tamsulosin (FLOMAX) 0.4 mg capsule Take 0.4 mg by mouth every evening., Historical Med      chlorthalidone (HYGROTEN) 25 mg tablet Take  by mouth daily. , Historical Med      dextran 70/hypromellose (ARTIFICIAL TEARS, PF, OP) Apply  to eye as needed., Historical Med      diphenhydrAMINE (BENADRYL) 25 mg capsule Take 25 mg by mouth nightly as needed for Itching., Historical Med      albuterol-ipratropium (DUO-NEB) 2.5 mg-0.5 mg/3 ml nebu 3 mL by Nebulization route every four (4) hours as needed. , Print, Disp-30 Nebule, R-0      albuterol (PROVENTIL HFA, VENTOLIN HFA, PROAIR HFA) 90 mcg/actuation inhaler Take 2 Puffs by inhalation every four (4) hours as needed for Wheezing or Shortness of Breath., Normal, Disp-1 Inhaler, R-1      ipratropium (ATROVENT) 0.03 % nasal spray 2 Sprays by Both Nostrils route every twelve (12) hours as needed., Historical Med             Discharge Condition: Good    Procedures : None    Consults: None      PHYSICAL EXAM Visit Vitals  /62 (BP 1 Location: Left arm, BP Patient Position: At rest;Sitting)   Pulse 84   Temp 98 °F (36.7 °C)   Resp 18   Ht 5' 8\" (1.727 m)   Wt 87.8 kg (193 lb 9 oz)   SpO2 97%   BMI 29.43 kg/m²     General: Awake, cooperative, no acute distress    HEENT: NC, Atraumatic. PERRLA, EOMI. Anicteric sclerae. Lungs:  CTA Bilaterally. No Wheezing/Rhonchi/Rales. Heart:  Regular  rhythm,  No murmur, No Rubs, No Gallops  Abdomen: Soft, Non distended, Non tender. +Bowel sounds,   Extremities: No c/c/e  Psych:   Not anxious or agitated. Neurologic:  No acute neurological deficits. Admission HPI :   Emerita Kirk is a 66 y.o. male who has past history of chronic respiratory failure, COPD, PAF, HTN, recent admission for MSSA pneumonia presents to ER with concerns of SOB for the past 3 days. It has been getting progressively getting worse. He has been using neb treatment with no help. He denies any fever/chills, chest pain, N/V. He was recently admitted for COPD exacerbation and MSSA pneumonia. He was discharged on long term antibiotic. In ER he received solumedrol and neb treatment and he was still wheezing. Hospital Course :   Mr. Mikhail Merrill was admitted to telemetry, he did not had any acute events during hospitalization. Chronic respiratory failure - continued with home oxygen at 2L NC     COPD exacerbation - he was start on Intravenous steroids, Inhaled short acting beta 2 agonist and anticholinergics and Empiric antibiotics. His breathing improved significantly, he will be discharged on prednisone taper dose. He will need follow up with Dr. Oneyda Hardy.     Recent MSSA pneumonia - on chronic azithromycin. CXR with improvement in right lung base opacity improved from previous study.  Will resume azithromycin at discharge.      PAF - maintained NSR     HTN - controlled on home medications       Activity: Activity as tolerated    Diet: Cardiac Diet    Follow-up: PCP, pulmonary    Disposition: home    Minutes spent on discharge: 45       Labs: Results:       Chemistry Recent Labs     09/09/19 0423 09/08/19  0530 09/07/19  0325   * 139* 141*    136 133*   K 4.3 4.1 4.5    104 101   CO2 23 23 25   BUN 32* 30* 25*   CREA 1.15 1.09 1.19   CA 7.9* 8.4* 8.4*   AGAP 9 9 7   BUCR 28* 28* 21*      CBC w/Diff Recent Labs     09/09/19 0423 09/08/19 0530 09/07/19  0325   WBC 8.4 9.6 5.4   RBC 3.63* 3.68* 3.87*   HGB 10.6* 10.7* 11.2*   HCT 31.9* 31.5* 33.0*    225 207      Cardiac Enzymes No results for input(s): CPK, CKND1, WILLARD in the last 72 hours. No lab exists for component: CKRMB, TROIP   Coagulation No results for input(s): PTP, INR, APTT in the last 72 hours. No lab exists for component: INREXT    Lipid Panel No results found for: CHOL, CHOLPOCT, CHOLX, CHLST, CHOLV, 530114, HDL, LDL, LDLC, DLDLP, 845368, VLDLC, VLDL, TGLX, TRIGL, TRIGP, TGLPOCT, CHHD, CHHDX   BNP No results for input(s): BNPP in the last 72 hours. Liver Enzymes No results for input(s): TP, ALB, TBIL, AP, SGOT, GPT in the last 72 hours. No lab exists for component: DBIL   Thyroid Studies No results found for: T4, T3U, TSH, TSHEXT         Significant Diagnostic Studies: Xr Chest Pa Lat    Result Date: 8/21/2019  EXAM: XR CHEST PA LAT CLINICAL INDICATION/HISTORY: Right lung opacities, chronic obstructive pulmonary disease, pneumonia -Additional: None COMPARISON: 8/19/2019 TECHNIQUE: Portable frontal view of the chest _______________ FINDINGS: SUPPORT DEVICES: None. HEART AND MEDIASTINUM: Stable appearing cardiac size and mediastinal contours. LUNGS AND PLEURAL SPACES: Redemonstration of asymmetric opacity at the right lung base which is slightly increased in appearance from immediate comparison examination. Pleural-based calcific density unchanged. No evidence of pneumothorax or pleural effusion. BONY THORAX AND SOFT TISSUES: No acute osseous abnormality.  _______________ IMPRESSION: 1. Mild interval increase in asymmetric opacity at the right lung base, which may reflect mild interval worsening of airspace disease and/or atelectasis. Xr Chest Port    Result Date: 9/6/2019  EXAM: XR CHEST PORT CLINICAL INDICATION/HISTORY: Shortness of breath -Additional: None COMPARISON: Several prior exams, most recently 8/21/2019 TECHNIQUE: Portable frontal view of the chest _______________ FINDINGS: SUPPORT DEVICES: None. HEART AND MEDIASTINUM: Table appearing cardiac size and mediastinal contours. LUNGS AND PLEURAL SPACES: Mild elevation of the right hemidiaphragm with faint asymmetric opacity the right lung base, appearing improved. Partially calcified pleural-based nodule object over the anterior to mid right lung demonstrated, unchanged. No pneumothorax or pleural effusion. BONY THORAX AND SOFT TISSUES: No acute osseous abnormality. _______________     IMPRESSION: 1. Mild atelectatic opacity at the right lung base, improved from prior examination without superimposed acute abnormality or significant interval change from prior. Xr Chest Port    Result Date: 8/19/2019  EXAM: XR CHEST PORT CLINICAL INDICATION/HISTORY: Respiratory distress -Additional: Chronic obstructive pulmonary disease COMPARISON: Several prior exams, most recently chest radiograph 7/12/2019 as well as prior CT scan of the chest 7/7/2019 TECHNIQUE: Frontal view of the chest _______________ FINDINGS: HEART AND MEDIASTINUM: Normal appearing cardiac size and mediastinal contours. LUNGS AND PLEURAL SPACES: Asymmetric opacity the right lung base is noted. Pleural-based calcification involving the anterior right hemithorax as noted on multiple prior exams. No pneumothorax. No definite pleural effusion. BONY THORAX AND SOFT TISSUES: No acute osseous abnormality _______________     IMPRESSION: 1. Mild asymmetric opacity at the right mid and lower lung base, potentially atelectasis or airspace disease.  2. Elliptical partially calcified right pleural based lesion unchanged. No results found for this or any previous visit.         CC: Nurys Venegas MD

## 2019-09-10 ENCOUNTER — PATIENT OUTREACH (OUTPATIENT)
Dept: FAMILY MEDICINE CLINIC | Age: 74
End: 2019-09-10

## 2019-09-10 ENCOUNTER — HOME CARE VISIT (OUTPATIENT)
Dept: SCHEDULING | Facility: HOME HEALTH | Age: 74
End: 2019-09-10

## 2019-09-10 PROCEDURE — G0299 HHS/HOSPICE OF RN EA 15 MIN: HCPCS

## 2019-09-10 NOTE — PROGRESS NOTES
Hospital Discharge Follow-Up      Date/Time:  9/10/2019 1:18 PM    Patient was admitted to [facility] on 19 and discharged on 19 for COPD. The physician discharge summary was available at the time of outreach. Patient was contacted within 1 business days of discharge. I spoke with patient's wife. She states he is doing ok. He is on O2 at 2L. [place] nurse saw him today. She confirmed the appointment with pcp on 19. No assistance is needed at this time. Top Challenges reviewed with the provider   none         Method of communication with provider :none    Inpatient RRAT score: High Risk            23       Total Score        3 Has Seen PCP in Last 6 Months (Yes=3, No=0)    2 . Living with Significant Other. Assisted Living. LTAC. SNF. or   Rehab    9 IP Visits Last 12 Months (1-3=4, 4=9, >4=11)    9 Charlson Comorbidity Score (Age + Comorbid Conditions)        Criteria that do not apply:    Patient Length of Stay (>5 days = 3)    Pt. Coverage (Medicare=5 , Medicaid, or Self-Pay=4)      Was this a readmission? yes   Patient stated reason for the readmission: SOB    Care Coordinator (CC) contacted the family by telephone to perform post hospital discharge assessment. Verified name and  with family as identifiers. Provided introduction to self, and explanation of the care coordinator role. Family given an opportunity to ask questions and does not have any further questions or concerns at this time. The family agrees to contact the PCP office for questions related to their healthcare. CC provided contact information for future reference. Disease Specific:   COPD    Summary of patient's top problems:  1.  COPD  2.   3.     Home Health orders at discharge: [address] [facility]: [facility]  Date of initial visit: 9/10/19    Barriers to care? none    Advance Care Planning:   Does patient have an Advance Directive:  reviewed and current     Medication(s):   New Medications at Discharge: Prednisone  Changed Medications at Discharge: None  Discontinued Medications at Discharge: None    Medication reconciliation was performed with family, who verbalizes understanding of administration of home medications. There were no barriers to obtaining medications identified at this time. Referral to Pharm D needed: no     Current Outpatient Medications   Medication Sig    predniSONE (DELTASONE) 10 mg tablet Prednisone 10mg p.o.  6 tabs daily for 2 days then drop to   5 tabs daily for 2 days then drop to   4 tabs daily for 2 days then drop to   3 tabs daily for 2 days then drop to  2 tabs daily for 2 days then drop to   1 tab daily for 2 days then stop. Dispense 42    dextran 70/hypromellose (ARTIFICIAL TEARS, PF, OP) Apply  to eye as needed.  azithromycin (ZITHROMAX) 250 mg tablet Take 1 Tab by mouth daily.  diphenhydrAMINE (BENADRYL) 25 mg capsule Take 25 mg by mouth nightly as needed for Itching.  albuterol-ipratropium (DUO-NEB) 2.5 mg-0.5 mg/3 ml nebu 3 mL by Nebulization route every four (4) hours as needed. (Patient taking differently: 3 mL by Nebulization route every four (4) hours as needed for Other (Shortness of breath). )    albuterol (PROVENTIL HFA, VENTOLIN HFA, PROAIR HFA) 90 mcg/actuation inhaler Take 2 Puffs by inhalation every four (4) hours as needed for Wheezing or Shortness of Breath.  ipratropium (ATROVENT) 0.03 % nasal spray 2 Sprays by Both Nostrils route every twelve (12) hours as needed.  tamsulosin (FLOMAX) 0.4 mg capsule Take 0.4 mg by mouth every evening.  chlorthalidone (HYGROTEN) 25 mg tablet Take  by mouth daily. No current facility-administered medications for this visit. There are no discontinued medications. [facility] follow up appointment(s): No future appointments.    Non-[facility] follow up appointment(s): Pcp 9/11/19

## 2019-09-13 ENCOUNTER — PATIENT OUTREACH (OUTPATIENT)
Dept: FAMILY MEDICINE CLINIC | Age: 74
End: 2019-09-13

## 2019-09-17 ENCOUNTER — PATIENT OUTREACH (OUTPATIENT)
Dept: FAMILY MEDICINE CLINIC | Age: 74
End: 2019-09-17

## 2019-09-26 ENCOUNTER — PATIENT OUTREACH (OUTPATIENT)
Dept: FAMILY MEDICINE CLINIC | Age: 74
End: 2019-09-26

## 2019-09-26 NOTE — PROGRESS NOTES
Transition Of Care Follow Up    Patient Outreached Attempted. Received patient's voicemail box. Message left requesting call back. No new admissions or ED visits noted. Patient saw pcp on 9/11/19.

## 2019-10-07 ENCOUNTER — PATIENT OUTREACH (OUTPATIENT)
Dept: FAMILY MEDICINE CLINIC | Age: 74
End: 2019-10-07

## 2019-10-07 NOTE — PROGRESS NOTES
Patient has graduated from the Transitions of Care Coordination  program on 10/7/19. Patient's symptoms are stable at this time. Patient/family has the ability to self-manage. Care management goals have been completed at this time. No further nurse navigator follow up scheduled. Goals Addressed    None         Pt has nurse navigator's contact information for any further questions, concerns, or needs. Patients upcoming visits:  No future appointments.

## 2019-10-19 ENCOUNTER — HOSPITAL ENCOUNTER (INPATIENT)
Age: 74
LOS: 3 days | Discharge: HOME HEALTH | DRG: 191 | End: 2019-10-22
Attending: EMERGENCY MEDICINE | Admitting: INTERNAL MEDICINE
Payer: MEDICARE

## 2019-10-19 ENCOUNTER — APPOINTMENT (OUTPATIENT)
Dept: GENERAL RADIOLOGY | Age: 74
DRG: 191 | End: 2019-10-19
Attending: EMERGENCY MEDICINE
Payer: MEDICARE

## 2019-10-19 DIAGNOSIS — J44.1 COPD EXACERBATION (HCC): Primary | ICD-10-CM

## 2019-10-19 PROBLEM — J44.9 COPD (CHRONIC OBSTRUCTIVE PULMONARY DISEASE) (HCC): Status: ACTIVE | Noted: 2019-10-19

## 2019-10-19 LAB
ANION GAP SERPL CALC-SCNC: 8 MMOL/L (ref 3–18)
ATRIAL RATE: 106 BPM
BASOPHILS # BLD: 0.1 K/UL (ref 0–0.1)
BASOPHILS NFR BLD: 1 % (ref 0–2)
BNP SERPL-MCNC: 42 PG/ML (ref 0–1800)
BUN SERPL-MCNC: 21 MG/DL (ref 7–18)
BUN/CREAT SERPL: 17 (ref 12–20)
CALCIUM SERPL-MCNC: 8.4 MG/DL (ref 8.5–10.1)
CALCULATED P AXIS, ECG09: 56 DEGREES
CALCULATED R AXIS, ECG10: 46 DEGREES
CALCULATED T AXIS, ECG11: 63 DEGREES
CHLORIDE SERPL-SCNC: 97 MMOL/L (ref 100–111)
CO2 SERPL-SCNC: 25 MMOL/L (ref 21–32)
CREAT SERPL-MCNC: 1.21 MG/DL (ref 0.6–1.3)
DIAGNOSIS, 93000: NORMAL
DIFFERENTIAL METHOD BLD: ABNORMAL
EOSINOPHIL # BLD: 1.2 K/UL (ref 0–0.4)
EOSINOPHIL NFR BLD: 10 % (ref 0–5)
ERYTHROCYTE [DISTWIDTH] IN BLOOD BY AUTOMATED COUNT: 14 % (ref 11.6–14.5)
EST. AVERAGE GLUCOSE BLD GHB EST-MCNC: 105 MG/DL
GLUCOSE BLD STRIP.AUTO-MCNC: 168 MG/DL (ref 70–110)
GLUCOSE SERPL-MCNC: 103 MG/DL (ref 74–99)
HBA1C MFR BLD: 5.3 % (ref 4.2–5.6)
HCT VFR BLD AUTO: 31.3 % (ref 36–48)
HGB BLD-MCNC: 10.6 G/DL (ref 13–16)
LYMPHOCYTES # BLD: 1.3 K/UL (ref 0.9–3.6)
LYMPHOCYTES NFR BLD: 11 % (ref 21–52)
MCH RBC QN AUTO: 29.4 PG (ref 24–34)
MCHC RBC AUTO-ENTMCNC: 33.9 G/DL (ref 31–37)
MCV RBC AUTO: 86.9 FL (ref 74–97)
MONOCYTES # BLD: 1 K/UL (ref 0.05–1.2)
MONOCYTES NFR BLD: 8 % (ref 3–10)
NEUTS SEG # BLD: 8.3 K/UL (ref 1.8–8)
NEUTS SEG NFR BLD: 70 % (ref 40–73)
P-R INTERVAL, ECG05: 160 MS
PLATELET # BLD AUTO: 303 K/UL (ref 135–420)
PMV BLD AUTO: 8.8 FL (ref 9.2–11.8)
POTASSIUM SERPL-SCNC: 4.6 MMOL/L (ref 3.5–5.5)
Q-T INTERVAL, ECG07: 348 MS
QRS DURATION, ECG06: 84 MS
QTC CALCULATION (BEZET), ECG08: 462 MS
RBC # BLD AUTO: 3.6 M/UL (ref 4.7–5.5)
SODIUM SERPL-SCNC: 130 MMOL/L (ref 136–145)
TROPONIN I SERPL-MCNC: <0.02 NG/ML (ref 0–0.04)
VENTRICULAR RATE, ECG03: 106 BPM
WBC # BLD AUTO: 12 K/UL (ref 4.6–13.2)

## 2019-10-19 PROCEDURE — 80048 BASIC METABOLIC PNL TOTAL CA: CPT

## 2019-10-19 PROCEDURE — 74011000250 HC RX REV CODE- 250: Performed by: INTERNAL MEDICINE

## 2019-10-19 PROCEDURE — 74011250636 HC RX REV CODE- 250/636: Performed by: INTERNAL MEDICINE

## 2019-10-19 PROCEDURE — 84484 ASSAY OF TROPONIN QUANT: CPT

## 2019-10-19 PROCEDURE — 85025 COMPLETE CBC W/AUTO DIFF WBC: CPT

## 2019-10-19 PROCEDURE — 74011636637 HC RX REV CODE- 636/637: Performed by: INTERNAL MEDICINE

## 2019-10-19 PROCEDURE — 77030013140 HC MSK NEB VYRM -A

## 2019-10-19 PROCEDURE — 93005 ELECTROCARDIOGRAM TRACING: CPT

## 2019-10-19 PROCEDURE — 94640 AIRWAY INHALATION TREATMENT: CPT

## 2019-10-19 PROCEDURE — 71045 X-RAY EXAM CHEST 1 VIEW: CPT

## 2019-10-19 PROCEDURE — 83036 HEMOGLOBIN GLYCOSYLATED A1C: CPT

## 2019-10-19 PROCEDURE — 65660000000 HC RM CCU STEPDOWN

## 2019-10-19 PROCEDURE — 99285 EMERGENCY DEPT VISIT HI MDM: CPT

## 2019-10-19 PROCEDURE — 74011250636 HC RX REV CODE- 250/636: Performed by: EMERGENCY MEDICINE

## 2019-10-19 PROCEDURE — 74011000250 HC RX REV CODE- 250: Performed by: EMERGENCY MEDICINE

## 2019-10-19 PROCEDURE — 74011250637 HC RX REV CODE- 250/637: Performed by: INTERNAL MEDICINE

## 2019-10-19 PROCEDURE — 83880 ASSAY OF NATRIURETIC PEPTIDE: CPT

## 2019-10-19 PROCEDURE — 82962 GLUCOSE BLOOD TEST: CPT

## 2019-10-19 RX ORDER — ACETAMINOPHEN 325 MG/1
650 TABLET ORAL
Status: DISCONTINUED | OUTPATIENT
Start: 2019-10-19 | End: 2019-10-22 | Stop reason: HOSPADM

## 2019-10-19 RX ORDER — HEPARIN SODIUM 5000 [USP'U]/ML
5000 INJECTION, SOLUTION INTRAVENOUS; SUBCUTANEOUS EVERY 8 HOURS
Status: DISCONTINUED | OUTPATIENT
Start: 2019-10-19 | End: 2019-10-22 | Stop reason: HOSPADM

## 2019-10-19 RX ORDER — ALBUTEROL SULFATE 2.5 MG/.5ML
5 SOLUTION RESPIRATORY (INHALATION)
Status: COMPLETED | OUTPATIENT
Start: 2019-10-19 | End: 2019-10-19

## 2019-10-19 RX ORDER — TAMSULOSIN HYDROCHLORIDE 0.4 MG/1
0.4 CAPSULE ORAL EVERY EVENING
Status: DISCONTINUED | OUTPATIENT
Start: 2019-10-19 | End: 2019-10-22 | Stop reason: HOSPADM

## 2019-10-19 RX ORDER — INSULIN LISPRO 100 [IU]/ML
INJECTION, SOLUTION INTRAVENOUS; SUBCUTANEOUS
Status: DISCONTINUED | OUTPATIENT
Start: 2019-10-19 | End: 2019-10-22 | Stop reason: HOSPADM

## 2019-10-19 RX ORDER — MAGNESIUM SULFATE 100 %
4 CRYSTALS MISCELLANEOUS AS NEEDED
Status: DISCONTINUED | OUTPATIENT
Start: 2019-10-19 | End: 2019-10-22 | Stop reason: HOSPADM

## 2019-10-19 RX ORDER — DEXTROSE MONOHYDRATE 100 MG/ML
125-250 INJECTION, SOLUTION INTRAVENOUS AS NEEDED
Status: DISCONTINUED | OUTPATIENT
Start: 2019-10-19 | End: 2019-10-22 | Stop reason: HOSPADM

## 2019-10-19 RX ORDER — SODIUM CHLORIDE 0.9 % (FLUSH) 0.9 %
5-40 SYRINGE (ML) INJECTION EVERY 8 HOURS
Status: DISCONTINUED | OUTPATIENT
Start: 2019-10-19 | End: 2019-10-22 | Stop reason: HOSPADM

## 2019-10-19 RX ORDER — IPRATROPIUM BROMIDE AND ALBUTEROL SULFATE 2.5; .5 MG/3ML; MG/3ML
3 SOLUTION RESPIRATORY (INHALATION)
Status: DISCONTINUED | OUTPATIENT
Start: 2019-10-19 | End: 2019-10-22 | Stop reason: HOSPADM

## 2019-10-19 RX ORDER — SODIUM CHLORIDE 0.9 % (FLUSH) 0.9 %
5-40 SYRINGE (ML) INJECTION AS NEEDED
Status: DISCONTINUED | OUTPATIENT
Start: 2019-10-19 | End: 2019-10-22 | Stop reason: HOSPADM

## 2019-10-19 RX ORDER — IPRATROPIUM BROMIDE AND ALBUTEROL SULFATE 2.5; .5 MG/3ML; MG/3ML
9 SOLUTION RESPIRATORY (INHALATION)
Status: COMPLETED | OUTPATIENT
Start: 2019-10-19 | End: 2019-10-19

## 2019-10-19 RX ORDER — HEPARIN SODIUM 5000 [USP'U]/ML
5000 INJECTION, SOLUTION INTRAVENOUS; SUBCUTANEOUS EVERY 8 HOURS
Status: DISCONTINUED | OUTPATIENT
Start: 2019-10-19 | End: 2019-10-19 | Stop reason: SDUPTHER

## 2019-10-19 RX ADMIN — TAMSULOSIN HYDROCHLORIDE 0.4 MG: 0.4 CAPSULE ORAL at 18:50

## 2019-10-19 RX ADMIN — IPRATROPIUM BROMIDE AND ALBUTEROL SULFATE 9 ML: .5; 3 SOLUTION RESPIRATORY (INHALATION) at 14:06

## 2019-10-19 RX ADMIN — ALBUTEROL SULFATE 5 MG: 2.5 SOLUTION RESPIRATORY (INHALATION) at 15:01

## 2019-10-19 RX ADMIN — IPRATROPIUM BROMIDE AND ALBUTEROL SULFATE 3 ML: .5; 3 SOLUTION RESPIRATORY (INHALATION) at 20:22

## 2019-10-19 RX ADMIN — METHYLPREDNISOLONE SODIUM SUCCINATE 60 MG: 125 INJECTION, POWDER, FOR SOLUTION INTRAMUSCULAR; INTRAVENOUS at 18:50

## 2019-10-19 RX ADMIN — INSULIN LISPRO 2 UNITS: 100 INJECTION, SOLUTION INTRAVENOUS; SUBCUTANEOUS at 21:31

## 2019-10-19 RX ADMIN — ACETAMINOPHEN 650 MG: 325 TABLET ORAL at 21:39

## 2019-10-19 RX ADMIN — CEFTRIAXONE 1 G: 1 INJECTION, POWDER, FOR SOLUTION INTRAMUSCULAR; INTRAVENOUS at 18:50

## 2019-10-19 RX ADMIN — AZITHROMYCIN MONOHYDRATE 500 MG: 500 INJECTION, POWDER, LYOPHILIZED, FOR SOLUTION INTRAVENOUS at 18:51

## 2019-10-19 RX ADMIN — HEPARIN SODIUM 5000 UNITS: 5000 INJECTION INTRAVENOUS; SUBCUTANEOUS at 18:49

## 2019-10-19 RX ADMIN — SODIUM CHLORIDE 500 ML: 900 INJECTION, SOLUTION INTRAVENOUS at 14:04

## 2019-10-19 RX ADMIN — Medication 10 ML: at 18:51

## 2019-10-19 RX ADMIN — Medication 10 ML: at 21:31

## 2019-10-19 RX ADMIN — IPRATROPIUM BROMIDE AND ALBUTEROL SULFATE 3 ML: .5; 3 SOLUTION RESPIRATORY (INHALATION) at 23:00

## 2019-10-19 NOTE — ED PROVIDER NOTES
EMERGENCY DEPARTMENT HISTORY AND PHYSICAL EXAM    Date: 10/19/2019  Patient Name: Josie Hawthorne    History of Presenting Illness     Chief Complaint   Patient presents with    Shortness of Breath         History Provided By: Patient    [place] [address] is a 66 y.o. male with PMHX of COPD, hypertension, pneumonia, former tobacco use who presents to the emergency department C/O dyspnea. He arrives by lights and sirens due to worsening shortness of breath since this AM.  Patient given DuoNeb, Solumedrol and albuterol by EMS . He states he is beginning to feel breathing is a little bit better. Patient speaking in short complete sentence. He states he is here every month and that he typically bounces back to ER when his steroid burst runs out. He is on 2 L nasal cannula home which he is compliant with. Denies recent tobacco use. Denies chest pain. He does note increased lower extremity edema over the past week bilaterally.     PCP: Ena Henry MD    Current Facility-Administered Medications   Medication Dose Route Frequency Provider Last Rate Last Dose    chlorpheniramine-HYDROcodone (TUSSIONEX) oral suspension 5 mL  5 mL Oral Q12H PRN Rose Zapata MD   5 mL at 10/20/19 0310    diphenhydrAMINE (BENADRYL) capsule 25 mg  25 mg Oral QHS Rose Zapata MD   Stopped at 10/20/19 0311    guaiFENesin ER (MUCINEX) tablet 600 mg  600 mg Oral Q12H Lis Yo MD   600 mg at 10/20/19 1135    furosemide (LASIX) injection 20 mg  20 mg IntraVENous BID Lis Yo MD   20 mg at 10/20/19 1330    albuterol-ipratropium (DUO-NEB) 2.5 MG-0.5 MG/3 ML  3 mL Nebulization Q4H PRN Michelle Booth MD        albuterol-ipratropium (DUO-NEB) 2.5 MG-0.5 MG/3 ML  3 mL Nebulization Q4H RT Lis Yo MD   3 mL at 10/20/19 1531    tamsulosin (FLOMAX) capsule 0.4 mg  0.4 mg Oral QPM Lis Yo MD   0.4 mg at 10/20/19 1757    sodium chloride (NS) flush 5-40 mL  5-40 mL IntraVENous Q8H Gely, Marzena Yeboah MD   10 mL at 10/20/19 1554    sodium chloride (NS) flush 5-40 mL  5-40 mL IntraVENous PRN Terrance Galvan MD        methylPREDNISolone (PF) (Solu-MEDROL) injection 60 mg  60 mg IntraVENous Q6H Terrance Galvan MD   60 mg at 10/20/19 1756    cefTRIAXone (ROCEPHIN) 1 g in sterile water (preservative free) 10 mL IV syringe  1 g IntraVENous Q24H Terrance Galvan MD   1 g at 10/20/19 1757    azithromycin (ZITHROMAX) 500 mg in 0.9% sodium chloride 250 mL (VIAL-MATE)  500 mg IntraVENous Q24H Terrance Galvan MD   500 mg at 10/20/19 1758    acetaminophen (TYLENOL) tablet 650 mg  650 mg Oral Q4H PRN Terrance Galvan MD   650 mg at 10/20/19 1554    heparin (porcine) injection 5,000 Units  5,000 Units SubCUTAneous Q8H Terrance Galvan MD   5,000 Units at 10/20/19 1757    insulin lispro (HUMALOG) injection   SubCUTAneous AC&HS Terrnace Galvan MD   Stopped at 10/20/19 1630    glucose chewable tablet 16 g  4 Tab Oral PRN Terrance Galvan MD        glucagon (GLUCAGEN) injection 1 mg  1 mg IntraMUSCular PRN Terrance Galvan MD        dextrose 10% infusion 125-250 mL  125-250 mL IntraVENous PRN Terrance Galvan MD        influenza vaccine 2019-20 (6 mos+)(PF) (FLUARIX/FLULAVAL/FLUZONE QUAD) injection 0.5 mL  0.5 mL IntraMUSCular PRIOR TO DISCHARGE Terrance Galvan MD           Past History     Past Medical History:  Past Medical History:   Diagnosis Date    Cancer [facility])     prostate    COPD (chronic obstructive pulmonary disease) ([facility] [address]     Hypertension     Nocturia     Pneumonia     Radiation effect        Past Surgical History:  Past Surgical History:   Procedure Laterality Date    HX HERNIA REPAIR         Family History:  Family History   Problem Relation Age of Onset    Heart Disease Mother        Social History:  Social History     Tobacco Use    Smoking status: Former Smoker     Types: Cigarettes    Smokeless tobacco: Never Used   Substance Use Topics    Alcohol use: No    Drug use: Not on file Allergies: Allergies   Allergen Reactions    Aspirin Other (comments)     \"messes my stomach up\"         Review of Systems   Review of Systems   Constitutional: Negative for chills and fever. Respiratory: Positive for cough, shortness of breath and wheezing. Negative for chest tightness. Cardiovascular: Positive for leg swelling. Negative for chest pain and palpitations. Gastrointestinal: Negative for abdominal pain, blood in stool, diarrhea, nausea and vomiting. Genitourinary: Negative for difficulty urinating and dysuria. Musculoskeletal: Negative for arthralgias and back pain. Neurological: Negative for weakness, light-headedness and headaches. Hematological: Negative for adenopathy. Does not bruise/bleed easily. Physical Exam     Vitals:    10/20/19 1126 10/20/19 1327 10/20/19 1532 10/20/19 1719   BP:  145/69  140/84   Pulse:  98  94   Resp:  20  20   Temp:  98.6 °F (37 °C)  99.3 °F (37.4 °C)   SpO2: 95% 97% 96% 97%   Weight:       Height:         Physical Exam   Constitutional: He is oriented to person, place, and time. He appears well-developed and well-nourished. He appears distressed (mild resp distress). HENT:   Head: Normocephalic and atraumatic. Mouth/Throat: Oropharynx is clear and moist.   Eyes: Pupils are equal, round, and reactive to light. EOM are normal.   Neck: Normal range of motion. Neck supple. Cardiovascular: Regular rhythm, normal heart sounds and intact distal pulses. Tachycardia present. Pulmonary/Chest: Tachypnea noted. He is in respiratory distress. He has decreased breath sounds in the right lower field. He has wheezes. He exhibits no tenderness. Abdominal: Soft. He exhibits no distension. There is no tenderness. There is no rebound and no guarding. Musculoskeletal: Normal range of motion. He exhibits edema (2+ nonpitting LE). He exhibits no tenderness. Neurological: He is alert and oriented to person, place, and time. No cranial nerve deficit. He exhibits normal muscle tone. Coordination normal.   Skin: Skin is warm and dry. Psychiatric: He has a normal mood and affect. His behavior is normal.   Nursing note and vitals reviewed. Nursing notes and vital signs reviewed        Diagnostic Study Results     Labs -     Recent Results (from the past 12 hour(s))   GLUCOSE, POC    Collection Time: 10/20/19 11:14 AM   Result Value Ref Range    Glucose (POC) 155 (H) 70 - 110 mg/dL   GLUCOSE, POC    Collection Time: 10/20/19  1:12 PM   Result Value Ref Range    Glucose (POC) 174 (H) 70 - 110 mg/dL   GLUCOSE, POC    Collection Time: 10/20/19  5:17 PM   Result Value Ref Range    Glucose (POC) 143 (H) 70 - 110 mg/dL       Radiologic Studies -   XR CHEST PORT   Final Result   IMPRESSION:         1. Overall stable examination with chronically elevated right hemidiaphragm and   adjacent right basilar atelectasis. XR CHEST PORT   Final Result   IMPRESSION:      No acute radiographic cardiopulmonary abnormality or significant interval change   from prior. CT Results  (Last 48 hours)    None        CXR Results  (Last 48 hours)               10/20/19 1209  XR CHEST PORT Final result    Impression:  IMPRESSION:           1. Overall stable examination with chronically elevated right hemidiaphragm and   adjacent right basilar atelectasis. Narrative:  EXAM: XR CHEST PORT       CLINICAL INDICATION/HISTORY: Shortness of Breath   -Additional: Emphysema       COMPARISON: Several prior exams, most recently 10/19/2019. TECHNIQUE: Frontal view of the chest       _______________       FINDINGS:       HEART AND MEDIASTINUM: Stable cardiac size and mediastinal contours. LUNGS AND PLEURAL SPACES: Elevated right hemidiaphragm redemonstrated, as   previously. Elliptical, peripherally calcified, pleural-based density over the   right mid thorax unchanged from multiple prior exams. Right basilar atelectasis   or scarring without change.  Left lung mildly hyperinflated and clear. No   pneumothorax or pleural effusion. BONY THORAX AND SOFT TISSUES: No acute osseous abnormality       _______________           10/19/19 1408  XR CHEST PORT Final result    Impression:  IMPRESSION:       No acute radiographic cardiopulmonary abnormality or significant interval change   from prior. Narrative:  EXAM: XR CHEST PORT       CLINICAL INDICATION/HISTORY: sob   -Additional: None       COMPARISON: Multiple prior exams, most recently 9/6/2019       TECHNIQUE: PA and lateral views of the chest       _______________       FINDINGS:       HEART AND MEDIASTINUM: Stable appearing cardiac size and mediastinal contours. LUNGS AND PLEURAL SPACES: No focal pneumonic opacity. No pneumothorax or pleural   effusion. Unchanged subsegmental atelectasis at the right lung base. Elliptical/peripherally calcified pleural-based density within the right mid   hemithorax unchanged from multiple prior exams.        BONY THORAX AND SOFT TISSUES: No acute osseous abnormality       _______________                 Medications given in the ED-  Medications   albuterol-ipratropium (DUO-NEB) 2.5 MG-0.5 MG/3 ML (3 mL Nebulization Given 10/20/19 1531)   tamsulosin (FLOMAX) capsule 0.4 mg (0.4 mg Oral Given 10/20/19 1757)   sodium chloride (NS) flush 5-40 mL (10 mL IntraVENous Given 10/20/19 1554)   sodium chloride (NS) flush 5-40 mL (has no administration in time range)   methylPREDNISolone (PF) (Solu-MEDROL) injection 60 mg (60 mg IntraVENous Given 10/20/19 1756)   cefTRIAXone (ROCEPHIN) 1 g in sterile water (preservative free) 10 mL IV syringe (1 g IntraVENous Given 10/20/19 1757)   azithromycin (ZITHROMAX) 500 mg in 0.9% sodium chloride 250 mL (VIAL-MATE) (500 mg IntraVENous Given 10/20/19 1758)   acetaminophen (TYLENOL) tablet 650 mg (650 mg Oral Given 10/20/19 1554)   heparin (porcine) injection 5,000 Units (5,000 Units SubCUTAneous Given 10/20/19 1757)   insulin lispro (HUMALOG) injection (0 Units SubCUTAneous Held 10/20/19 1630)   glucose chewable tablet 16 g (has no administration in time range)   glucagon (GLUCAGEN) injection 1 mg (has no administration in time range)   dextrose 10% infusion 125-250 mL (has no administration in time range)   influenza vaccine 2019-20 (6 mos+)(PF) (FLUARIX/FLULAVAL/FLUZONE QUAD) injection 0.5 mL (has no administration in time range)   chlorpheniramine-HYDROcodone (TUSSIONEX) oral suspension 5 mL (5 mL Oral Given 10/20/19 0310)   diphenhydrAMINE (BENADRYL) capsule 25 mg (0 mg Oral Held 10/20/19 0311)   guaiFENesin ER (MUCINEX) tablet 600 mg (600 mg Oral Given 10/20/19 1135)   furosemide (LASIX) injection 20 mg (20 mg IntraVENous Given 10/20/19 1330)   albuterol-ipratropium (DUO-NEB) 2.5 MG-0.5 MG/3 ML (has no administration in time range)   sodium chloride 0.9 % bolus infusion 500 mL (0 mL IntraVENous IV Completed 10/19/19 1607)   albuterol-ipratropium (DUO-NEB) 2.5 MG-0.5 MG/3 ML (9 mL Nebulization Given 10/19/19 1406)   albuterol CONCENTRATE 2.5mg/0.5 mL neb soln (5 mg Nebulization Given 10/19/19 1501)         Medical Decision Making   I am the first provider for this patient. I reviewed the vital signs, available nursing notes, past medical history, past surgical history, family history and social history. Vital Signs-Reviewed the patient's vital signs. Pulse Oximetry Analysis - 92% on RA     Cardiac Monitor:  Rate: 104 bpm  Rhythm: NSR    EKG interpretation: (Preliminary)  EKG read by Dr. Regina Lai at 1356   Sinus tachycardia rate of 106    Records Reviewed: Nursing Notes and Old Medical Records    Provider Notes (Medical Decision Making): Josie Hawthorne is a 66 y.o. male respiratory distress. Does not require BiPAP or intubation at this time. We will continue medical management with duo nebs. Given steroids by EMS. Check chest x-ray, labs plan on admission for continued treatment.     Procedures:  Procedures    ED Course:   2:28 PM  Evaluated patient he is feeling a little bit better speaking short sentences with diffuse expiratory wheezes throughout. Will place on continuous albuterol and speak with hospitalist.    Diagnosis and Disposition       Core Measures:  For Hospitalized Patients:    1. Hospitalization Decision Time:  The decision to hospitalize the patient was made by Dr Giuliana Sierra at 21 [phone] on 10/19/2019    2. Aspirin: Aspirin was not given because the patient did not present with a stroke at the time of their Emergency Department evaluation    6:30 PM  Patient is being admitted to the hospital by . The results of their tests and reasons for their admission have been discussed with them and/or available family. They convey agreement and understanding for the need to be admitted and for their admission diagnosis. CONDITIONS ON ADMISSION:  Sepsis is not present at the time of admission. Deep Vein Thrombosis is not present at the time of admission. Thrombosis is not present at the time of admission. Urinary Tract Infection is not present at the time of admission. Pneumonia is not present at the time of admission. MRSA is not present at the time of admission. Wound infection is not present at the time of admission. Pressure Ulcer is not present at the time of admission. CLINICAL IMPRESSION:    No diagnosis found. _______________________________      Please note that this dictation was completed with [employer] ([employer] the computer voice recognition software. Quite often unanticipated grammatical, syntax, homophones, and other interpretive errors are inadvertently transcribed by the computer software. Please disregard these errors. Please excuse any errors that have escaped final proofreading.

## 2019-10-19 NOTE — PROGRESS NOTES
Verbal bedside received from [facility] off going nurse. Assumed patient care, bed in low position, call light within reach, white board updated. 2000 Patient assessment completed, complains of pain reported, med administered. 1762 Patient complained of respiratory distress, VS completed WNL, paged respiratory therapist for breathing treatment. meds administered. Bedside and Verbal shift change report given to Lizette Solano RN (oncoming nurse) by Bernardo Booker (offgoing nurse). Report included the following information SBAR, Kardex and MAR.

## 2019-10-19 NOTE — H&P
HISTORY AND PHYSICAL EXAMINATION      Assessment:     Patient Active Problem List    Diagnosis Date Noted    COPD (chronic obstructive pulmonary disease) ([facility] 10/19/2019    Pneumonia of right lower lobe due to methicillin susceptible Staphylococcus aureus (MSSA) ([facility] 08/19/2019    Paroxysmal atrial fibrillation ([facility] 08/19/2019    Chronic respiratory failure with hypoxia ([facility] 08/19/2019    Advanced care planning/counseling discussion 07/08/2019    Debility 07/08/2019    Acute exacerbation of chronic obstructive pulmonary disease (COPD) ([facility] 02/08/2019    Asbestosis ([facility] 10/19/2018    Chronic renal disease, stage 3, moderately decreased glomerular filtration rate between 30-59 mL/min/1.73 square meter ([facility] 07/20/2018    COPD (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 ([facility] 04/20/2018    Hypertension        1) Acute resp distress with chronic hypoxic resp failure and hypoxia  2) COPD exacerbation  3) Acute bronchitis  4)   Plan:   Admit to tele  IV solumederol, nebs and IV empric abx  Labs as ordered  accucehcks and ssi  Sputum gs and culutre       GI/DVT Prophylaxissq hep    Code Status: full    Subjective:     Alberto Hoffman is a 66 y.o. male being admitted to the hospital with worsening sob and wheezing. He was last admitted here in Sept with freq hosp. He is on home oxygen. He was brought to ER after having increasig sob and resp distress. PT was given duoneb, solumedrol en route which has helped.          Past Medical History:   Diagnosis Date    Cancer [facility])     prostate    COPD (chronic obstructive pulmonary disease) ([facility]     Hypertension     Nocturia     Pneumonia     Radiation effect        Past Surgical History:   Procedure Laterality Date    HX HERNIA REPAIR         Allergies   Allergen Reactions    Aspirin Other (comments)     \"messes my stomach up\"       Current Facility-Administered Medications   Medication Dose Route Frequency Provider Last Rate Last Dose  albuterol-ipratropium (DUO-NEB) 2.5 MG-0.5 MG/3 ML  3 mL Nebulization Q4H RT Kendrick Agee MD        tamsulosin (FLOMAX) capsule 0.4 mg  0.4 mg Oral QPM Kendrick Agee MD        sodium chloride (NS) flush 5-40 mL  5-40 mL IntraVENous Q8H Kendrick Agee MD        sodium chloride (NS) flush 5-40 mL  5-40 mL IntraVENous PRN Kendrick Agee MD        methylPREDNISolone (PF) (Solu-MEDROL) injection 60 mg  60 mg IntraVENous Q6H Kendrick Agee MD        cefTRIAXone (ROCEPHIN) 1 g in sterile water (preservative free) 10 mL IV syringe  1 g IntraVENous Q24H Kendrick Agee MD        azithromycin (ZITHROMAX) 500 mg in 0.9% sodium chloride 250 mL (VIAL-MATE)  500 mg IntraVENous Q24H Kendrick Agee MD        acetaminophen (TYLENOL) tablet 650 mg  650 mg Oral Q4H PRN Kendrick Agee MD        heparin (porcine) injection 5,000 Units  5,000 Units SubCUTAneous Q8H Kendrick Agee MD        heparin (porcine) injection 5,000 Units  5,000 Units SubCUTAneous Q8H Kendrick Agee MD        insulin lispro (HUMALOG) injection   SubCUTAneous AC&HS Kendrick Agee MD        glucose chewable tablet 16 g  4 Tab Oral PRN Kendrick Agee MD        glucagon (GLUCAGEN) injection 1 mg  1 mg IntraMUSCular PRN Kendrick Agee MD        dextrose 10% infusion 125-250 mL  125-250 mL IntraVENous RAMON Agee MD           Prior to Admission Medications   Prescriptions Last Dose Informant Patient Reported? Taking? OXYGEN-AIR DELIVERY SYSTEMS   Yes No   Sig: Take 2 L by inhalation continuous. albuterol (PROVENTIL HFA, VENTOLIN HFA, PROAIR HFA) 90 mcg/actuation inhaler   No No   Sig: Take 2 Puffs by inhalation every four (4) hours as needed for Wheezing or Shortness of Breath. albuterol-ipratropium (DUO-NEB) 2.5 mg-0.5 mg/3 ml nebu   No No   Sig: 3 mL by Nebulization route every four (4) hours as needed. Patient taking differently: 3 mL by Nebulization route every four (4) hours as needed for Other (Shortness of breath). azithromycin (ZITHROMAX) 250 mg tablet   No No   Sig: Take 1 Tab by mouth daily. chlorthalidone (HYGROTEN) 25 mg tablet   Yes No   Sig: Take  by mouth daily. dextran 70/hypromellose (ARTIFICIAL TEARS, PF, OP)   Yes No   Sig: Apply  to eye as needed. diphenhydrAMINE (BENADRYL) 25 mg capsule   Yes No   Sig: Take 25 mg by mouth nightly as needed for Itching. fluticasone propionate (FLONASE) 50 mcg/actuation nasal spray   Yes No   Si Sprays by Both Nostrils route daily. ipratropium (ATROVENT) 0.03 % nasal spray   Yes No   Si Sprays by Both Nostrils route every twelve (12) hours as needed. predniSONE (DELTASONE) 10 mg tablet   No No   Sig: Prednisone 10mg p.o.  6 tabs daily for 2 days then drop to   5 tabs daily for 2 days then drop to   4 tabs daily for 2 days then drop to   3 tabs daily for 2 days then drop to  2 tabs daily for 2 days then drop to   1 tab daily for 2 days then stop. Dispense 42   tamsulosin (FLOMAX) 0.4 mg capsule   Yes No   Sig: Take 0.4 mg by mouth every evening.       Facility-Administered Medications: None       Social History     Socioeconomic History    Marital status:      Spouse name: Not on file    Number of children: Not on file    Years of education: Not on file    Highest education level: Not on file   Occupational History    Not on file   Social Needs    Financial resource strain: Not on file    Food insecurity:     Worry: Not on file     Inability: Not on file    Transportation needs:     Medical: Not on file     Non-medical: Not on file   Tobacco Use    Smoking status: Former Smoker     Types: Cigarettes    Smokeless tobacco: Never Used   Substance and Sexual Activity    Alcohol use: No    Drug use: Not on file    Sexual activity: Not on file   Lifestyle    Physical activity:     Days per week: Not on file     Minutes per session: Not on file    Stress: Not on file   Relationships    Social connections:     Talks on phone: Not on file Gets together: Not on file     Attends Congregation service: Not on file     Active member of club or organization: Not on file     Attends meetings of clubs or organizations: Not on file     Relationship status: Not on file    Intimate partner violence:     Fear of current or ex partner: Not on file     Emotionally abused: Not on file     Physically abused: Not on file     Forced sexual activity: Not on file   Other Topics Concern    Not on file   Social History Narrative    Not on file       Family History   Problem Relation Age of Onset    Heart Disease Mother            Review of Systems   Constitutional: Positive for malaise/fatigue. HENT: Negative. Eyes: Negative. Respiratory: Positive for sputum production, shortness of breath and wheezing. Cardiovascular: Positive for leg swelling. Negative for chest pain, palpitations and orthopnea. Gastrointestinal: Negative. Negative for abdominal pain, diarrhea and heartburn. Genitourinary: Negative for dysuria and hematuria. Skin: Negative. Neurological: Positive for weakness. Psychiatric/Behavioral: Negative for depression, substance abuse and suicidal ideas. The patient is not nervous/anxious.         Objective:     Patient Vitals for the past 24 hrs:   BP Temp Pulse Resp SpO2 Height Weight   10/19/19 1627 152/78 98.3 °F (36.8 °C) (!) 103 24 98 % -- --   10/19/19 1542 139/72 98.2 °F (36.8 °C) (!) 102 24 97 % -- --   10/19/19 1503 -- -- -- -- 97 % -- --   10/19/19 1430 141/78 -- (!) 104 19 97 % -- --   10/19/19 1415 143/68 -- (!) 102 16 100 % -- --   10/19/19 1410 -- -- -- -- 97 % -- --   10/19/19 1400 126/70 -- (!) 114 25 93 % -- --   10/19/19 1352 -- -- -- -- 95 % -- --   10/19/19 1345 136/73 -- (!) 108 24 94 % -- --   10/19/19 1340 169/75 98.4 °F (36.9 °C) (!) 110 20 96 % 5' 8\" (1.727 m) 90.7 kg (200 lb)           Extended / Orthostatic Vitals:    Vital Signs  Level of Consciousness: Alert (10/19/19 1627)  Temp: 98.3 °F (36.8 °C) (10/19/19 1627)  Temp Source: Oral (10/19/19 1627)  Pulse (Heart Rate): (!) 103 (10/19/19 1627)  Cardiac Rhythm: Sinus tachycardia (10/19/19 1352)  Resp Rate: 24 (10/19/19 1627)  BP: 152/78 (10/19/19 1627)  MAP (Monitor): 94 (10/19/19 1430)  MAP (Calculated): 103 (10/19/19 1627)  BP 1 Location: Right arm (10/19/19 1627)  BP 1 Method: Automatic (10/19/19 1627)  BP Patient Position: At rest;Supine (10/19/19 1627)  MEWS Score: 3 (10/19/19 1627)         Oxygen Therapy  O2 Sat (%): 98 % (10/19/19 1627)  Pulse via Oximetry: 102 beats per minute (10/19/19 1503)  O2 Device: Nasal cannula (10/19/19 1542)  O2 Flow Rate (L/min): 2 l/min (10/19/19 1542)     Physical Exam   Constitutional: He is oriented to person, place, and time. He appears distressed. Neck: Normal range of motion. Neck supple. No JVD present. Cardiovascular: Regular rhythm and normal heart sounds. Tachycardia present. Pulmonary/Chest: Tachypnea noted. He is in respiratory distress. He has decreased breath sounds. He has wheezes in the right middle field, the left middle field and the left lower field. He has rhonchi in the right lower field and the left lower field. Abdominal: Soft. Bowel sounds are normal. He exhibits no distension. There is no tenderness. There is no rebound. Musculoskeletal: Normal range of motion. He exhibits no edema. Neurological: He is alert and oriented to person, place, and time. Skin: Skin is warm and dry. Psychiatric: Affect normal.   Nursing note and vitals reviewed.       Laboratory:     Recent Results (from the past 24 hour(s))   CBC WITH AUTOMATED DIFF    Collection Time: 10/19/19  1:46 PM   Result Value Ref Range    WBC 12.0 4.6 - 13.2 K/uL    RBC 3.60 (L) 4.70 - 5.50 M/uL    HGB 10.6 (L) 13.0 - 16.0 g/dL    HCT 31.3 (L) 36.0 - 48.0 %    MCV 86.9 74.0 - 97.0 FL    MCH 29.4 24.0 - 34.0 PG    MCHC 33.9 31.0 - 37.0 g/dL    RDW 14.0 11.6 - 14.5 %    PLATELET 603 982 - 354 K/uL    MPV 8.8 (L) 9.2 - 11.8 FL    NEUTROPHILS 70 40 - 73 %    LYMPHOCYTES 11 (L) 21 - 52 %    MONOCYTES 8 3 - 10 %    EOSINOPHILS 10 (H) 0 - 5 %    BASOPHILS 1 0 - 2 %    ABS. NEUTROPHILS 8.3 (H) 1.8 - 8.0 K/UL    ABS. LYMPHOCYTES 1.3 0.9 - 3.6 K/UL    ABS. MONOCYTES 1.0 0.05 - 1.2 K/UL    ABS. EOSINOPHILS 1.2 (H) 0.0 - 0.4 K/UL    ABS. BASOPHILS 0.1 0.0 - 0.1 K/UL    DF AUTOMATED     METABOLIC PANEL, BASIC    Collection Time: 10/19/19  1:46 PM   Result Value Ref Range    Sodium 130 (L) 136 - 145 mmol/L    Potassium 4.6 3.5 - 5.5 mmol/L    Chloride 97 (L) 100 - 111 mmol/L    CO2 25 21 - 32 mmol/L    Anion gap 8 3.0 - 18 mmol/L    Glucose 103 (H) 74 - 99 mg/dL    BUN 21 (H) 7.0 - 18 MG/DL    Creatinine 1.21 0.6 - 1.3 MG/DL    BUN/Creatinine ratio 17 12 - 20      GFR est AA >60 >60 ml/min/1.73m2    GFR est non-AA 58 (L) >60 ml/min/1.73m2    Calcium 8.4 (L) 8.5 - 10.1 MG/DL   NT-PRO BNP    Collection Time: 10/19/19  1:46 PM   Result Value Ref Range    NT pro-BNP 42 0 - 1,800 PG/ML   TROPONIN I    Collection Time: 10/19/19  1:46 PM   Result Value Ref Range    Troponin-I, QT <0.02 0.0 - 0.045 NG/ML   EKG, 12 LEAD, INITIAL    Collection Time: 10/19/19  1:51 PM   Result Value Ref Range    Ventricular Rate 106 BPM    Atrial Rate 106 BPM    P-R Interval 160 ms    QRS Duration 84 ms    Q-T Interval 348 ms    QTC Calculation (Bezet) 462 ms    Calculated P Axis 56 degrees    Calculated R Axis 46 degrees    Calculated T Axis 63 degrees    Diagnosis       Sinus tachycardia  Otherwise normal ECG  When compared with ECG of 06-SEP-2019 11:39,  premature ventricular complexes are no longer present           Imaging/Procedures:     Significant Diagnostic Studies: Xr Chest Port    Result Date: 10/19/2019  EXAM: XR CHEST PORT CLINICAL INDICATION/HISTORY: sob -Additional: None COMPARISON: Multiple prior exams, most recently 9/6/2019 TECHNIQUE: PA and lateral views of the chest _______________ FINDINGS: HEART AND MEDIASTINUM: Stable appearing cardiac size and mediastinal contours.  LUNGS AND PLEURAL SPACES: No focal pneumonic opacity. No pneumothorax or pleural effusion. Unchanged subsegmental atelectasis at the right lung base. Elliptical/peripherally calcified pleural-based density within the right mid hemithorax unchanged from multiple prior exams. BONY THORAX AND SOFT TISSUES: No acute osseous abnormality _______________     IMPRESSION: No acute radiographic cardiopulmonary abnormality or significant interval change from prior.              Hernan Lindquist MD    10/19/2019, 4:34 PM

## 2019-10-20 ENCOUNTER — APPOINTMENT (OUTPATIENT)
Dept: GENERAL RADIOLOGY | Age: 74
DRG: 191 | End: 2019-10-20
Attending: HOSPITALIST
Payer: MEDICARE

## 2019-10-20 LAB
ALBUMIN SERPL-MCNC: 3 G/DL (ref 3.4–5)
ALBUMIN/GLOB SERPL: 1.1 {RATIO} (ref 0.8–1.7)
ALP SERPL-CCNC: 80 U/L (ref 45–117)
ALT SERPL-CCNC: 13 U/L (ref 16–61)
ANION GAP SERPL CALC-SCNC: 9 MMOL/L (ref 3–18)
AST SERPL-CCNC: 12 U/L (ref 10–38)
BASOPHILS # BLD: 0 K/UL (ref 0–0.1)
BASOPHILS NFR BLD: 0 % (ref 0–3)
BILIRUB SERPL-MCNC: 0.4 MG/DL (ref 0.2–1)
BNP SERPL-MCNC: 117 PG/ML (ref 0–1800)
BUN SERPL-MCNC: 24 MG/DL (ref 7–18)
BUN/CREAT SERPL: 20 (ref 12–20)
CALCIUM SERPL-MCNC: 8.1 MG/DL (ref 8.5–10.1)
CHLORIDE SERPL-SCNC: 98 MMOL/L (ref 100–111)
CO2 SERPL-SCNC: 24 MMOL/L (ref 21–32)
CREAT SERPL-MCNC: 1.2 MG/DL (ref 0.6–1.3)
DIFFERENTIAL METHOD BLD: ABNORMAL
EOSINOPHIL # BLD: 0 K/UL (ref 0–0.4)
EOSINOPHIL NFR BLD: 0 % (ref 0–5)
ERYTHROCYTE [DISTWIDTH] IN BLOOD BY AUTOMATED COUNT: 13.8 % (ref 11.6–14.5)
GLOBULIN SER CALC-MCNC: 2.8 G/DL (ref 2–4)
GLUCOSE BLD STRIP.AUTO-MCNC: 143 MG/DL (ref 70–110)
GLUCOSE BLD STRIP.AUTO-MCNC: 145 MG/DL (ref 70–110)
GLUCOSE BLD STRIP.AUTO-MCNC: 148 MG/DL (ref 70–110)
GLUCOSE BLD STRIP.AUTO-MCNC: 155 MG/DL (ref 70–110)
GLUCOSE BLD STRIP.AUTO-MCNC: 174 MG/DL (ref 70–110)
GLUCOSE SERPL-MCNC: 144 MG/DL (ref 74–99)
HCT VFR BLD AUTO: 30.5 % (ref 36–48)
HGB BLD-MCNC: 10.2 G/DL (ref 13–16)
LYMPHOCYTES # BLD: 0.2 K/UL (ref 0.8–3.5)
LYMPHOCYTES NFR BLD: 2 % (ref 20–51)
MAGNESIUM SERPL-MCNC: 2.3 MG/DL (ref 1.6–2.6)
MCH RBC QN AUTO: 29.1 PG (ref 24–34)
MCHC RBC AUTO-ENTMCNC: 33.4 G/DL (ref 31–37)
MCV RBC AUTO: 87.1 FL (ref 74–97)
MONOCYTES # BLD: 0 K/UL (ref 0–1)
MONOCYTES NFR BLD: 0 % (ref 2–9)
NEUTS BAND NFR BLD MANUAL: 2 % (ref 0–5)
NEUTS SEG # BLD: 10 K/UL (ref 1.8–8)
NEUTS SEG NFR BLD: 96 % (ref 42–75)
PHOSPHATE SERPL-MCNC: 4.2 MG/DL (ref 2.5–4.9)
PLATELET # BLD AUTO: 288 K/UL (ref 135–420)
PMV BLD AUTO: 8.7 FL (ref 9.2–11.8)
POTASSIUM SERPL-SCNC: 4.5 MMOL/L (ref 3.5–5.5)
PROT SERPL-MCNC: 5.8 G/DL (ref 6.4–8.2)
RBC # BLD AUTO: 3.5 M/UL (ref 4.7–5.5)
RBC MORPH BLD: ABNORMAL
SODIUM SERPL-SCNC: 131 MMOL/L (ref 136–145)
TSH SERPL DL<=0.05 MIU/L-ACNC: 0.86 UIU/ML (ref 0.36–3.74)
WBC # BLD AUTO: 10.4 K/UL (ref 4.6–13.2)

## 2019-10-20 PROCEDURE — 36415 COLL VENOUS BLD VENIPUNCTURE: CPT

## 2019-10-20 PROCEDURE — 74011250636 HC RX REV CODE- 250/636: Performed by: INTERNAL MEDICINE

## 2019-10-20 PROCEDURE — 71045 X-RAY EXAM CHEST 1 VIEW: CPT

## 2019-10-20 PROCEDURE — 94640 AIRWAY INHALATION TREATMENT: CPT

## 2019-10-20 PROCEDURE — 74011250637 HC RX REV CODE- 250/637: Performed by: INTERNAL MEDICINE

## 2019-10-20 PROCEDURE — 77010033678 HC OXYGEN DAILY

## 2019-10-20 PROCEDURE — 84443 ASSAY THYROID STIM HORMONE: CPT

## 2019-10-20 PROCEDURE — 83735 ASSAY OF MAGNESIUM: CPT

## 2019-10-20 PROCEDURE — 74011000250 HC RX REV CODE- 250: Performed by: INTERNAL MEDICINE

## 2019-10-20 PROCEDURE — 74011636637 HC RX REV CODE- 636/637: Performed by: INTERNAL MEDICINE

## 2019-10-20 PROCEDURE — 85025 COMPLETE CBC W/AUTO DIFF WBC: CPT

## 2019-10-20 PROCEDURE — 94760 N-INVAS EAR/PLS OXIMETRY 1: CPT

## 2019-10-20 PROCEDURE — 74011250637 HC RX REV CODE- 250/637: Performed by: FAMILY MEDICINE

## 2019-10-20 PROCEDURE — 83880 ASSAY OF NATRIURETIC PEPTIDE: CPT

## 2019-10-20 PROCEDURE — 80053 COMPREHEN METABOLIC PANEL: CPT

## 2019-10-20 PROCEDURE — 84100 ASSAY OF PHOSPHORUS: CPT

## 2019-10-20 PROCEDURE — 82962 GLUCOSE BLOOD TEST: CPT

## 2019-10-20 PROCEDURE — 65660000000 HC RM CCU STEPDOWN

## 2019-10-20 RX ORDER — DIPHENHYDRAMINE HCL 25 MG
25 CAPSULE ORAL
Status: DISCONTINUED | OUTPATIENT
Start: 2019-10-20 | End: 2019-10-22 | Stop reason: HOSPADM

## 2019-10-20 RX ORDER — GUAIFENESIN 600 MG/1
600 TABLET, EXTENDED RELEASE ORAL EVERY 12 HOURS
Status: DISCONTINUED | OUTPATIENT
Start: 2019-10-20 | End: 2019-10-22 | Stop reason: HOSPADM

## 2019-10-20 RX ORDER — IPRATROPIUM BROMIDE AND ALBUTEROL SULFATE 2.5; .5 MG/3ML; MG/3ML
3 SOLUTION RESPIRATORY (INHALATION)
Status: DISCONTINUED | OUTPATIENT
Start: 2019-10-20 | End: 2019-10-22 | Stop reason: HOSPADM

## 2019-10-20 RX ORDER — FUROSEMIDE 10 MG/ML
20 INJECTION INTRAMUSCULAR; INTRAVENOUS DAILY
Status: DISCONTINUED | OUTPATIENT
Start: 2019-10-20 | End: 2019-10-20

## 2019-10-20 RX ORDER — DIPHENHYDRAMINE HCL 25 MG
25 CAPSULE ORAL
Status: DISCONTINUED | OUTPATIENT
Start: 2019-10-20 | End: 2019-10-20

## 2019-10-20 RX ORDER — HYDROCODONE POLISTIREX AND CHLORPHENIRAMINE POLISTIREX 10; 8 MG/5ML; MG/5ML
5 SUSPENSION, EXTENDED RELEASE ORAL
Status: DISCONTINUED | OUTPATIENT
Start: 2019-10-20 | End: 2019-10-22 | Stop reason: HOSPADM

## 2019-10-20 RX ORDER — FUROSEMIDE 10 MG/ML
20 INJECTION INTRAMUSCULAR; INTRAVENOUS 2 TIMES DAILY
Status: DISCONTINUED | OUTPATIENT
Start: 2019-10-20 | End: 2019-10-21

## 2019-10-20 RX ADMIN — HEPARIN SODIUM 5000 UNITS: 5000 INJECTION INTRAVENOUS; SUBCUTANEOUS at 17:57

## 2019-10-20 RX ADMIN — AZITHROMYCIN MONOHYDRATE 500 MG: 500 INJECTION, POWDER, LYOPHILIZED, FOR SOLUTION INTRAVENOUS at 17:58

## 2019-10-20 RX ADMIN — FUROSEMIDE 20 MG: 10 INJECTION, SOLUTION INTRAMUSCULAR; INTRAVENOUS at 21:33

## 2019-10-20 RX ADMIN — DIPHENHYDRAMINE HYDROCHLORIDE 25 MG: 25 CAPSULE ORAL at 21:33

## 2019-10-20 RX ADMIN — METHYLPREDNISOLONE SODIUM SUCCINATE 60 MG: 125 INJECTION, POWDER, FOR SOLUTION INTRAMUSCULAR; INTRAVENOUS at 00:39

## 2019-10-20 RX ADMIN — IPRATROPIUM BROMIDE AND ALBUTEROL SULFATE 3 ML: .5; 3 SOLUTION RESPIRATORY (INHALATION) at 03:38

## 2019-10-20 RX ADMIN — HYDROCODONE POLISTIREX AND CHLORPHENIRAMINE POLISTIREX 5 ML: 10; 8 SUSPENSION, EXTENDED RELEASE ORAL at 03:10

## 2019-10-20 RX ADMIN — IPRATROPIUM BROMIDE AND ALBUTEROL SULFATE 3 ML: .5; 3 SOLUTION RESPIRATORY (INHALATION) at 20:51

## 2019-10-20 RX ADMIN — GUAIFENESIN 600 MG: 600 TABLET, EXTENDED RELEASE ORAL at 11:35

## 2019-10-20 RX ADMIN — IPRATROPIUM BROMIDE AND ALBUTEROL SULFATE 3 ML: .5; 3 SOLUTION RESPIRATORY (INHALATION) at 15:31

## 2019-10-20 RX ADMIN — TAMSULOSIN HYDROCHLORIDE 0.4 MG: 0.4 CAPSULE ORAL at 17:57

## 2019-10-20 RX ADMIN — IPRATROPIUM BROMIDE AND ALBUTEROL SULFATE 3 ML: .5; 3 SOLUTION RESPIRATORY (INHALATION) at 11:26

## 2019-10-20 RX ADMIN — IPRATROPIUM BROMIDE AND ALBUTEROL SULFATE 3 ML: .5; 3 SOLUTION RESPIRATORY (INHALATION) at 07:23

## 2019-10-20 RX ADMIN — HEPARIN SODIUM 5000 UNITS: 5000 INJECTION INTRAVENOUS; SUBCUTANEOUS at 00:39

## 2019-10-20 RX ADMIN — HEPARIN SODIUM 5000 UNITS: 5000 INJECTION INTRAVENOUS; SUBCUTANEOUS at 09:43

## 2019-10-20 RX ADMIN — CEFTRIAXONE 1 G: 1 INJECTION, POWDER, FOR SOLUTION INTRAMUSCULAR; INTRAVENOUS at 17:57

## 2019-10-20 RX ADMIN — ACETAMINOPHEN 650 MG: 325 TABLET ORAL at 15:54

## 2019-10-20 RX ADMIN — METHYLPREDNISOLONE SODIUM SUCCINATE 60 MG: 125 INJECTION, POWDER, FOR SOLUTION INTRAMUSCULAR; INTRAVENOUS at 17:56

## 2019-10-20 RX ADMIN — ACETAMINOPHEN 650 MG: 325 TABLET ORAL at 09:43

## 2019-10-20 RX ADMIN — INSULIN LISPRO 2 UNITS: 100 INJECTION, SOLUTION INTRAVENOUS; SUBCUTANEOUS at 13:30

## 2019-10-20 RX ADMIN — GUAIFENESIN 600 MG: 600 TABLET, EXTENDED RELEASE ORAL at 21:33

## 2019-10-20 RX ADMIN — FUROSEMIDE 20 MG: 10 INJECTION, SOLUTION INTRAMUSCULAR; INTRAVENOUS at 13:30

## 2019-10-20 RX ADMIN — METHYLPREDNISOLONE SODIUM SUCCINATE 60 MG: 125 INJECTION, POWDER, FOR SOLUTION INTRAMUSCULAR; INTRAVENOUS at 06:29

## 2019-10-20 RX ADMIN — Medication 10 ML: at 06:30

## 2019-10-20 RX ADMIN — IPRATROPIUM BROMIDE AND ALBUTEROL SULFATE 3 ML: .5; 3 SOLUTION RESPIRATORY (INHALATION) at 23:08

## 2019-10-20 RX ADMIN — METHYLPREDNISOLONE SODIUM SUCCINATE 60 MG: 125 INJECTION, POWDER, FOR SOLUTION INTRAMUSCULAR; INTRAVENOUS at 11:35

## 2019-10-20 RX ADMIN — Medication 10 ML: at 15:54

## 2019-10-20 RX ADMIN — Medication 10 ML: at 21:34

## 2019-10-20 NOTE — PROGRESS NOTES
Responded to RRT, pt c/o shortness of breath. O2 saturation at 97%, breathing treatment given by RT. Hospitalist added Q4 PRN breathing treatment.

## 2019-10-20 NOTE — PROGRESS NOTES
Verbal bedside received from [address] [facility] off going nurse. Assumed patient care, bed in low position, call light within reach, white board updated. 0600 Patient had uneventful night. No concerns raised or observed. Bedside and Verbal shift change report given to Kath Deshpande RN (oncoming nurse) by Bud Samaniego (offgoing nurse). Report included the following information SBAR, Kardex and MAR.

## 2019-10-20 NOTE — PROGRESS NOTES
Hospitalist Progress Note    Patient: Balaji Oliveros MRN: [id number]  [facility]: [id number]    YOB: 1945  Age: 66 y.o. Sex: male    DOA: 10/19/2019 LOS:  LOS: 1 day              IMPRESSION and Plan:    Balaji Oliveros is a 66 y.o. male with   Patient Active Problem List    Diagnosis Date Noted    COPD (chronic obstructive pulmonary disease) ([facility] 10/19/2019    Pneumonia of right lower lobe due to methicillin susceptible Staphylococcus aureus (MSSA) ([facility] [address] 08/19/2019    Paroxysmal atrial fibrillation ([facility] [address] [facility] 08/19/2019    [facility] with [facility] 08/19/2019    Advanced care planning/counseling discussion 07/08/2019    Debility 07/08/2019    Acute exacerbation of chronic obstructive pulmonary disease (COPD) ([facility] [address] 02/08/2019    Asbestosis ([facility] [address] 10/19/2018    Chronic renal disease, stage 3, moderately decreased glomerular filtration rate between 30-59 mL/min/1.73 square meter ([address] 07/20/2018    COPD (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 ([facility] [address] 04/20/2018    Hypertension      Active Problems:    COPD (chronic obstructive pulmonary disease) ([facility] [address] (10/19/2019)          1) Acute resp distress with chronic hypoxic resp failure and hypoxia -- appears to be improving    2) COPD exacerbation  -- cont current IV solumedrol and nebs. On home O2    3) Acute bronchitis ? cough -- IV empiric abx. Add mucinex    4)  elevated BS - due to steroids. Cont ssi    5) BPH - on Flomax    6) LE edema --  Last echo in 7/19 with preserved EF. Will repeat echo. IV lasix today      Patient's condition is fair    Anticipate hosp for 3-4 days        Recommend to continue hospitalization. Discussed with patient. Chief Complaints:   Chief Complaint   Patient presents with    Shortness of Breath     SUBJECTIVE:  Pt is seen and examined.   \"feels somewhat better except for the cough and sob\" also co weak and increasing LE edema --although chronic           Review of systems:    Review of Systems   Constitutional: Positive for malaise/fatigue. HENT: Negative. Eyes: Negative. Respiratory: Positive for cough, sputum production and shortness of breath. Cardiovascular: Positive for leg swelling. Negative for chest pain, palpitations and orthopnea. Gastrointestinal: Negative. Negative for abdominal pain, diarrhea and heartburn. Genitourinary: Negative for dysuria and hematuria. Skin: Negative. Neurological: Positive for weakness. Psychiatric/Behavioral: Negative for depression, substance abuse and suicidal ideas. The patient is not nervous/anxious. PE:  Patient Vitals for the past 24 hrs:   BP Temp Pulse Resp SpO2 Height Weight   10/20/19 0943 137/66 97.4 °F (36.3 °C) 97 20 -- -- --   10/20/19 0328 155/78 97.7 °F (36.5 °C) 100 22 -- -- --   10/19/19 2310 133/86 98.8 °F (37.1 °C) 97 24 -- -- --   10/19/19 2054 137/66 98.6 °F (37 °C) 100 24 -- -- --   10/19/19 1627 152/78 98.3 °F (36.8 °C) (!) 103 24 98 % -- --   10/19/19 1542 139/72 98.2 °F (36.8 °C) (!) 102 24 97 % -- --   10/19/19 1503 -- -- -- -- 97 % -- --   10/19/19 1430 141/78 -- (!) 104 19 97 % -- --   10/19/19 1415 143/68 -- (!) 102 16 100 % -- --   10/19/19 1410 -- -- -- -- 97 % -- --   10/19/19 1400 126/70 -- (!) 114 25 93 % -- --   10/19/19 1352 -- -- -- -- 95 % -- --   10/19/19 1345 136/73 -- (!) 108 24 94 % -- --   10/19/19 1340 169/75 98.4 °F (36.9 °C) (!) 110 20 96 % 5' 8\" (1.727 m) 90.7 kg (200 lb)       Intake/Output Summary (Last 24 hours) at 10/20/2019 1112  Last data filed at 10/20/2019 0815  Gross per 24 hour   Intake 1220 ml   Output 1925 ml   Net -705 ml     Patient Vitals for the past 120 hrs:   Weight   10/19/19 1340 90.7 kg (200 lb)         Physical Exam   Constitutional: He is oriented to person, place, and time. He appears distressed. Neck: Normal range of motion. Neck supple. No JVD present. Cardiovascular: Normal rate, regular rhythm and normal heart sounds. Pulmonary/Chest: Tachypnea noted. He is in respiratory distress. He has decreased breath sounds. He has wheezes in the right middle field, the right lower field, the left middle field and the left lower field. He has no rales. Abdominal: Soft. Bowel sounds are normal. He exhibits no distension. There is no tenderness. There is no rebound. Musculoskeletal: Normal range of motion. He exhibits no edema. Neurological: He is alert and oriented to person, place, and time. Skin: Skin is warm and dry. Psychiatric: Affect normal.   Nursing note and vitals reviewed.           Intake and Output:  Current Shift:  10/20 0701 - 10/20 1900  In: 720 [P.O.:720]  Out: 500 [Urine:500]  Last three shifts:  10/18 1901 - 10/20 0700  In: 500 [I.V.:500]  Out: 1425 [Urine:1425]    Lab/Data Reviewed:  Recent Results (from the past 8 hour(s))   GLUCOSE, POC    Collection Time: 10/20/19  6:17 AM   Result Value Ref Range    Glucose (POC) 148 (H) 70 - 110 mg/dL     Medications:  Current Facility-Administered Medications   Medication Dose Route Frequency    chlorpheniramine-HYDROcodone (TUSSIONEX) oral suspension 5 mL  5 mL Oral Q12H PRN    diphenhydrAMINE (BENADRYL) capsule 25 mg  25 mg Oral QHS    albuterol-ipratropium (DUO-NEB) 2.5 MG-0.5 MG/3 ML  3 mL Nebulization Q4H RT    tamsulosin (FLOMAX) capsule 0.4 mg  0.4 mg Oral QPM    sodium chloride (NS) flush 5-40 mL  5-40 mL IntraVENous Q8H    sodium chloride (NS) flush 5-40 mL  5-40 mL IntraVENous PRN    methylPREDNISolone (PF) (Solu-MEDROL) injection 60 mg  60 mg IntraVENous Q6H    cefTRIAXone (ROCEPHIN) 1 g in sterile water (preservative free) 10 mL IV syringe  1 g IntraVENous Q24H    azithromycin (ZITHROMAX) 500 mg in 0.9% sodium chloride 250 mL (VIAL-MATE)  500 mg IntraVENous Q24H    acetaminophen (TYLENOL) tablet 650 mg  650 mg Oral Q4H PRN    heparin (porcine) injection 5,000 Units  5,000 Units SubCUTAneous Q8H    insulin lispro (HUMALOG) injection   SubCUTAneous AC&HS    glucose chewable tablet 16 g  4 Tab Oral PRN    glucagon (GLUCAGEN) injection 1 mg  1 mg IntraMUSCular PRN    dextrose 10% infusion 125-250 mL  125-250 mL IntraVENous PRN    influenza vaccine 2019-20 (6 mos+)(PF) (FLUARIX/FLULAVAL/FLUZONE QUAD) injection 0.5 mL  0.5 mL IntraMUSCular PRIOR TO DISCHARGE       Recent Results (from the past 24 hour(s))   CBC WITH AUTOMATED DIFF    Collection Time: 10/19/19  1:46 PM   Result Value Ref Range    WBC 12.0 4.6 - 13.2 K/uL    RBC 3.60 (L) 4.70 - 5.50 M/uL    HGB 10.6 (L) 13.0 - 16.0 g/dL    HCT 31.3 (L) 36.0 - 48.0 %    MCV 86.9 74.0 - 97.0 FL    MCH 29.4 24.0 - 34.0 PG    MCHC 33.9 31.0 - 37.0 g/dL    RDW 14.0 11.6 - 14.5 %    PLATELET 592 472 - 208 K/uL    MPV 8.8 (L) 9.2 - 11.8 FL    NEUTROPHILS 70 40 - 73 %    LYMPHOCYTES 11 (L) 21 - 52 %    MONOCYTES 8 3 - 10 %    EOSINOPHILS 10 (H) 0 - 5 %    BASOPHILS 1 0 - 2 %    ABS. NEUTROPHILS 8.3 (H) 1.8 - 8.0 K/UL    ABS. LYMPHOCYTES 1.3 0.9 - 3.6 K/UL    ABS. MONOCYTES 1.0 0.05 - 1.2 K/UL    ABS. EOSINOPHILS 1.2 (H) 0.0 - 0.4 K/UL    ABS.  BASOPHILS 0.1 0.0 - 0.1 K/UL    DF AUTOMATED     METABOLIC PANEL, BASIC    Collection Time: 10/19/19  1:46 PM   Result Value Ref Range    Sodium 130 (L) 136 - 145 mmol/L    Potassium 4.6 3.5 - 5.5 mmol/L    Chloride 97 (L) 100 - 111 mmol/L    CO2 25 21 - 32 mmol/L    Anion gap 8 3.0 - 18 mmol/L    Glucose 103 (H) 74 - 99 mg/dL    BUN 21 (H) 7.0 - 18 MG/DL    Creatinine 1.21 0.6 - 1.3 MG/DL    BUN/Creatinine ratio 17 12 - 20      GFR est AA >60 >60 ml/min/1.73m2    GFR est non-AA 58 (L) >60 ml/min/1.73m2    Calcium 8.4 (L) 8.5 - 10.1 MG/DL   NT-PRO BNP    Collection Time: 10/19/19  1:46 PM   Result Value Ref Range    NT pro-BNP 42 0 - 1,800 PG/ML   TROPONIN I    Collection Time: 10/19/19  1:46 PM   Result Value Ref Range    Troponin-I, QT <0.02 0.0 - 0.045 NG/ML   HEMOGLOBIN A1C WITH EAG    Collection Time: 10/19/19  1:46 PM   Result Value Ref Range    Hemoglobin A1c 5.3 4.2 - 5.6 % Est. average glucose 105 mg/dL   EKG, 12 LEAD, INITIAL    Collection Time: 10/19/19  1:51 PM   Result Value Ref Range    Ventricular Rate 106 BPM    Atrial Rate 106 BPM    P-R Interval 160 ms    QRS Duration 84 ms    Q-T Interval 348 ms    QTC Calculation (Bezet) 462 ms    Calculated P Axis 56 degrees    Calculated R Axis 46 degrees    Calculated T Axis 63 degrees    Diagnosis       Sinus tachycardia  Otherwise normal ECG  Confirmed by Agueda Jeffries MD, [facility] ([id number]) on 10/19/2019 11:33:51 PM     GLUCOSE, POC    Collection Time: 10/19/19  9:18 PM   Result Value Ref Range    Glucose (POC) 168 (H) 70 - 110 mg/dL   CBC WITH AUTOMATED DIFF    Collection Time: 10/20/19  1:05 AM   Result Value Ref Range    WBC 10.4 4.6 - 13.2 K/uL    RBC 3.50 (L) 4.70 - 5.50 M/uL    HGB 10.2 (L) 13.0 - 16.0 g/dL    HCT 30.5 (L) 36.0 - 48.0 %    MCV 87.1 74.0 - 97.0 FL    MCH 29.1 24.0 - 34.0 PG    MCHC 33.4 31.0 - 37.0 g/dL    RDW 13.8 11.6 - 14.5 %    PLATELET 031 754 - 735 K/uL    MPV 8.7 (L) 9.2 - 11.8 FL    NEUTROPHILS 96 (H) 42 - 75 %    BAND NEUTROPHILS 2 0 - 5 %    LYMPHOCYTES 2 (L) 20 - 51 %    MONOCYTES 0 (L) 2 - 9 %    EOSINOPHILS 0 0 - 5 %    BASOPHILS 0 0 - 3 %    ABS. NEUTROPHILS 10.0 (H) 1.8 - 8.0 K/UL    ABS. LYMPHOCYTES 0.2 (L) 0.8 - 3.5 K/UL    ABS. MONOCYTES 0.0 0 - 1.0 K/UL    ABS. EOSINOPHILS 0.0 0.0 - 0.4 K/UL    ABS.  BASOPHILS 0.0 0.0 - 0.1 K/UL    RBC COMMENTS NORMOCYTIC, NORMOCHROMIC      DF MANUAL     METABOLIC PANEL, COMPREHENSIVE    Collection Time: 10/20/19  1:05 AM   Result Value Ref Range    Sodium 131 (L) 136 - 145 mmol/L    Potassium 4.5 3.5 - 5.5 mmol/L    Chloride 98 (L) 100 - 111 mmol/L    CO2 24 21 - 32 mmol/L    Anion gap 9 3.0 - 18 mmol/L    Glucose 144 (H) 74 - 99 mg/dL    BUN 24 (H) 7.0 - 18 MG/DL    Creatinine 1.20 0.6 - 1.3 MG/DL    BUN/Creatinine ratio 20 12 - 20      GFR est AA >60 >60 ml/min/1.73m2    GFR est non-AA 59 (L) >60 ml/min/1.73m2    Calcium 8.1 (L) 8.5 - 10.1 MG/DL    Bilirubin, total 0.4 0.2 - 1.0 MG/DL    ALT (SGPT) 13 (L) 16 - 61 U/L    AST (SGOT) 12 10 - 38 U/L    Alk.  phosphatase 80 45 - 117 U/L    Protein, total 5.8 (L) 6.4 - 8.2 g/dL    Albumin 3.0 (L) 3.4 - 5.0 g/dL    Globulin 2.8 2.0 - 4.0 g/dL    A-G Ratio 1.1 0.8 - 1.7     NT-PRO BNP    Collection Time: 10/20/19  1:05 AM   Result Value Ref Range    NT pro- 0 - 1,800 PG/ML   MAGNESIUM    Collection Time: 10/20/19  1:05 AM   Result Value Ref Range    Magnesium 2.3 1.6 - 2.6 mg/dL   PHOSPHORUS    Collection Time: 10/20/19  1:05 AM   Result Value Ref Range    Phosphorus 4.2 2.5 - 4.9 MG/DL   GLUCOSE, POC    Collection Time: 10/20/19  6:17 AM   Result Value Ref Range    Glucose (POC) 148 (H) 70 - 110 mg/dL       Procedures/imaging: see electronic medical records for all procedures/Xrays and details which were not copied into this note but were reviewed prior to creation of Chriss Hameed MD   10/20/2019, 11:12 AM

## 2019-10-20 NOTE — PROGRESS NOTES
RESPIRATORY NOTE:  Attended RRT,  pt visibly struggling to breathe, neb tx given, uncontrolled congested coughing noted which is causing increased shortness of breath and anxietly,  coarse BS and wheezes throughout, emphasized pt to practice controlled coughing and to stay as calm as he can, also to drink small sips mindfully although he is VERY hard of hearing, will continue to monitor.

## 2019-10-20 NOTE — PROGRESS NOTES
Problem: Falls - Risk of  Goal: *Absence of Falls  Description  Document Addison Anna Fall Risk and appropriate interventions in the flowsheet.   Outcome: Progressing Towards Goal  Note:   Fall Risk Interventions:  Mobility Interventions: Utilize walker, cane, or other assistive device         Medication Interventions: Teach patient to arise slowly, Utilize gait belt for transfers/ambulation                   Problem: Patient Education: Go to Patient Education Activity  Goal: Patient/Family Education  Outcome: Progressing Towards Goal     Problem: Breathing Pattern - Ineffective  Goal: *Absence of hypoxia  Outcome: Progressing Towards Goal  Goal: *Use of effective breathing techniques  Outcome: Progressing Towards Goal  Goal: *PALLIATIVE CARE:  Alleviation of Dyspnea  Outcome: Progressing Towards Goal     Problem: Patient Education: Go to Patient Education Activity  Goal: Patient/Family Education  Outcome: Progressing Towards Goal     Problem: Pain  Goal: *Control of Pain  Outcome: Progressing Towards Goal     Problem: Patient Education: Go to Patient Education Activity  Goal: Patient/Family Education  Outcome: Progressing Towards Goal

## 2019-10-20 NOTE — PROGRESS NOTES
0700 Received bedside report from Raegan Posada RN. Pt in bed resting. Call bell in reach. 1102 RRT called on pt, pt had SOB and difficulty breathing. Pt rang call bell and yelled for help. Pt was standing when I arrived, standing in a small amount of urine. Had pt sit on edge of bed. Gus Small, CAITLIN came in. RRT called, Kadi Galeana, RT arrived at 0, gave DuoNeb. Kelli Flor RN came in at 0 with [facility] from ICU. O2 sats in 90s, VINCE Day bumped O2 up to 4L, [facility] returned it to 2L. Pt instructed to perform pursed lip breathing. Coughing causes difficulty breathing. Chest xray ordered. Has been completed since leaving room. Pt states nervous to be alone in the room in case it happens again.

## 2019-10-20 NOTE — PROGRESS NOTES
Reason for Admission:   Chart reviewed as CM on call; patient is a 66 yr old male who presented to ED with worsening SOB and wheezing; last admitted at [facility]   9/6 to 9/9/19. In route to [facility] was given duoneb and solumed which helped. RRAT Score:     High; 27               Resources/supports as identified by patient/family:   Lives w/ wife in own home; wears O2 continuously 2L/min but when is discharged will not have O2 for transport home 'because wife cannot lift it' - estimate drive to home at 10 minutes which patient states he can do; DME includes cane, walker and home nebulizer machine. Top Challenges facing patient (as identified by patient/family and CM): Finances/Medication cost?      Medicare; currently has all needed DME              Transportation? Wife will transport home            Support system or lack thereof?  family                     Living arrangements? Lives with spouse           Self-care/ADLs/Cognition? Alert, oriented, appropriate          Current Advanced Directive/Advance Care Plan:  Full code                          Plan for utilizing home health:    None at this time                 Transition of Care Plan:           Met with patient at bedside, noted slight SOB with conversation and audible wheezes but patient is not in respiratory distress. Reviewed CM role and assistance; CM to follow as needed for discharge needs. Care Management Interventions  PCP Verified by CM: Yes  Mode of Transport at Discharge:  Other (see comment)(spouse to transport home at discharge)  Current Support Network: Lives with Spouse, Own Home  Confirm Follow Up Transport: Family  Plan discussed with Pt/Family/Caregiver: Yes  Discharge Location  Discharge Placement: Home

## 2019-10-20 NOTE — ROUTINE PROCESS
Bedside and Verbal shift change report given to CHANTAL Guerra RN (oncoming nurse) by Olga Lidia Hernández. Guido Jackson RN (offgoing nurse). Report included the following information SBAR, Kardex, Intake/Output and MAR.

## 2019-10-21 ENCOUNTER — APPOINTMENT (OUTPATIENT)
Dept: NON INVASIVE DIAGNOSTICS | Age: 74
DRG: 191 | End: 2019-10-21
Attending: INTERNAL MEDICINE
Payer: MEDICARE

## 2019-10-21 LAB
ALBUMIN SERPL-MCNC: 3 G/DL (ref 3.4–5)
ALBUMIN/GLOB SERPL: 1.1 {RATIO} (ref 0.8–1.7)
ALP SERPL-CCNC: 74 U/L (ref 45–117)
ALT SERPL-CCNC: 13 U/L (ref 16–61)
ANION GAP SERPL CALC-SCNC: 10 MMOL/L (ref 3–18)
AST SERPL-CCNC: 8 U/L (ref 10–38)
AV VELOCITY RATIO: 0.89
AV VTI RATIO: 1
BASOPHILS # BLD: 0 K/UL (ref 0–0.1)
BASOPHILS NFR BLD: 0 % (ref 0–2)
BILIRUB SERPL-MCNC: 0.2 MG/DL (ref 0.2–1)
BNP SERPL-MCNC: 899 PG/ML (ref 0–1800)
BUN SERPL-MCNC: 29 MG/DL (ref 7–18)
BUN/CREAT SERPL: 25 (ref 12–20)
CALCIUM SERPL-MCNC: 8.1 MG/DL (ref 8.5–10.1)
CHLORIDE SERPL-SCNC: 99 MMOL/L (ref 100–111)
CO2 SERPL-SCNC: 26 MMOL/L (ref 21–32)
CREAT SERPL-MCNC: 1.17 MG/DL (ref 0.6–1.3)
DIFFERENTIAL METHOD BLD: ABNORMAL
ECHO AO ASC DIAM: 3.33 CM
ECHO AO ROOT DIAM: 2.78 CM
ECHO AV AREA PEAK VELOCITY: 2.4 CM2
ECHO AV AREA VTI: 2.6 CM2
ECHO AV AREA/BSA PEAK VELOCITY: 1.2 CM2/M2
ECHO AV AREA/BSA VTI: 1.3 CM2/M2
ECHO AV MEAN GRADIENT: 4.8 MMHG
ECHO AV MEAN VELOCITY: 1.04 M/S
ECHO AV PEAK GRADIENT: 9.3 MMHG
ECHO AV PEAK VELOCITY: 152.52 CM/S
ECHO AV VTI: 30.36 CM
ECHO IVC PROX: 2.2 CM
ECHO LA VOL 2C: 39.59 ML (ref 18–58)
ECHO LA VOL 4C: 23.27 ML (ref 18–58)
ECHO LA VOL BP: 74.79 ML (ref 18–58)
ECHO LA VOL/BSA BIPLANE: 36.67 ML/M2 (ref 16–28)
ECHO LA VOLUME INDEX A2C: 19.41 ML/M2 (ref 16–28)
ECHO LA VOLUME INDEX A4C: 11.41 ML/M2 (ref 16–28)
ECHO LV E' LATERAL VELOCITY: 11 CM/S
ECHO LV E' SEPTAL VELOCITY: 15 CM/S
ECHO LV EDV A2C: 0.33 ML
ECHO LV EDV A4C: 0.13 ML
ECHO LV EDV BP: 41.2 ML (ref 67–155)
ECHO LV EDV INDEX A4C: 0.1 ML/M2
ECHO LV EDV INDEX BP: 20.2 ML/M2
ECHO LV EDV NDEX A2C: 0.2 ML/M2
ECHO LV EDV TEICHHOLZ: 0.21 ML
ECHO LV EJECTION FRACTION A2C: 51 %
ECHO LV EJECTION FRACTION A4C: 50 %
ECHO LV EJECTION FRACTION BIPLANE: 45 % (ref 55–100)
ECHO LV ESV A2C: 0.16 ML
ECHO LV ESV A4C: 0.07 ML
ECHO LV ESV BP: 0.11 ML (ref 22–58)
ECHO LV ESV INDEX A2C: 0.1 ML/M2
ECHO LV ESV INDEX A4C: 0 ML/M2
ECHO LV ESV INDEX BP: 0.1 ML/M2
ECHO LV ESV TEICHHOLZ: 0.12 ML
ECHO LV INTERNAL DIMENSION DIASTOLIC: 14.74 CM (ref 4.2–5.9)
ECHO LV INTERNAL DIMENSION SYSTOLIC: 11.57 CM
ECHO LV IVSD: 1.17 CM (ref 0.6–1)
ECHO LV MASS 2D: 119.1 G (ref 88–224)
ECHO LV MASS INDEX 2D: 58.4 G/M2 (ref 49–115)
ECHO LV POSTERIOR WALL DIASTOLIC: 85.98 CM (ref 0.6–1)
ECHO LVOT DIAM: 1.87 CM
ECHO LVOT PEAK GRADIENT: 7.3 MMHG
ECHO LVOT PEAK VELOCITY: 135.29 CM/S
ECHO LVOT SV: 16.6 ML
ECHO LVOT VTI: 29.28 CM
ECHO MV A VELOCITY: 108.33 CM/S
ECHO MV AREA PHT: 3.7 CM2
ECHO MV E DECELERATION TIME (DT): 205.9 MS
ECHO MV E VELOCITY: 92.46 CM/S
ECHO MV E/A RATIO: 0.85
ECHO MV E/E' LATERAL: 8.41
ECHO MV E/E' RATIO (AVERAGED): 7.28
ECHO MV E/E' SEPTAL: 6.16
ECHO MV PRESSURE HALF TIME (PHT): 59.7 MS
ECHO RA AREA 4C: 14.92 CM2
ECHO RV INTERNAL DIMENSION: 3.19 CM
ECHO RV TAPSE: 3 CM (ref 1.5–2)
ECHO TV REGURGITANT MAX VELOCITY: 160.01 CM/S
ECHO TV REGURGITANT PEAK GRADIENT: 10.2 MMHG
EOSINOPHIL # BLD: 0 K/UL (ref 0–0.4)
EOSINOPHIL NFR BLD: 0 % (ref 0–5)
ERYTHROCYTE [DISTWIDTH] IN BLOOD BY AUTOMATED COUNT: 13.7 % (ref 11.6–14.5)
GLOBULIN SER CALC-MCNC: 2.8 G/DL (ref 2–4)
GLUCOSE BLD STRIP.AUTO-MCNC: 141 MG/DL (ref 70–110)
GLUCOSE BLD STRIP.AUTO-MCNC: 145 MG/DL (ref 70–110)
GLUCOSE BLD STRIP.AUTO-MCNC: 148 MG/DL (ref 70–110)
GLUCOSE BLD STRIP.AUTO-MCNC: 183 MG/DL (ref 70–110)
GLUCOSE BLD STRIP.AUTO-MCNC: 203 MG/DL (ref 70–110)
GLUCOSE SERPL-MCNC: 148 MG/DL (ref 74–99)
HCT VFR BLD AUTO: 30.1 % (ref 36–48)
HGB BLD-MCNC: 10.2 G/DL (ref 13–16)
LVFS 2D: 21.49 %
LVOT MG: 3.69 MMHG
LVOT MV: 0.91 CM/S
LVSV (MOD BI): 10.48 ML
LVSV (MOD SINGLE 4C): 5.59 ML
LVSV (MOD SINGLE): 13.89 ML
LVSV (TEICH): 7.98 ML
LYMPHOCYTES # BLD: 0.6 K/UL (ref 0.9–3.6)
LYMPHOCYTES NFR BLD: 5 % (ref 21–52)
MAGNESIUM SERPL-MCNC: 2.3 MG/DL (ref 1.6–2.6)
MCH RBC QN AUTO: 29.4 PG (ref 24–34)
MCHC RBC AUTO-ENTMCNC: 33.9 G/DL (ref 31–37)
MCV RBC AUTO: 86.7 FL (ref 74–97)
MONOCYTES # BLD: 0.5 K/UL (ref 0.05–1.2)
MONOCYTES NFR BLD: 4 % (ref 3–10)
MV DEC SLOPE: 4.49
NEUTS SEG # BLD: 10.7 K/UL (ref 1.8–8)
NEUTS SEG NFR BLD: 91 % (ref 40–73)
PHOSPHATE SERPL-MCNC: 4.2 MG/DL (ref 2.5–4.9)
PLATELET # BLD AUTO: 299 K/UL (ref 135–420)
PMV BLD AUTO: 8.8 FL (ref 9.2–11.8)
POTASSIUM SERPL-SCNC: 3.8 MMOL/L (ref 3.5–5.5)
PROT SERPL-MCNC: 5.8 G/DL (ref 6.4–8.2)
RBC # BLD AUTO: 3.47 M/UL (ref 4.7–5.5)
SODIUM SERPL-SCNC: 135 MMOL/L (ref 136–145)
WBC # BLD AUTO: 11.8 K/UL (ref 4.6–13.2)

## 2019-10-21 PROCEDURE — 74011250637 HC RX REV CODE- 250/637: Performed by: INTERNAL MEDICINE

## 2019-10-21 PROCEDURE — 74011636637 HC RX REV CODE- 636/637: Performed by: INTERNAL MEDICINE

## 2019-10-21 PROCEDURE — 94640 AIRWAY INHALATION TREATMENT: CPT

## 2019-10-21 PROCEDURE — 36415 COLL VENOUS BLD VENIPUNCTURE: CPT

## 2019-10-21 PROCEDURE — 93306 TTE W/DOPPLER COMPLETE: CPT

## 2019-10-21 PROCEDURE — 74011250636 HC RX REV CODE- 250/636: Performed by: INTERNAL MEDICINE

## 2019-10-21 PROCEDURE — 84100 ASSAY OF PHOSPHORUS: CPT

## 2019-10-21 PROCEDURE — 85025 COMPLETE CBC W/AUTO DIFF WBC: CPT

## 2019-10-21 PROCEDURE — 97161 PT EVAL LOW COMPLEX 20 MIN: CPT

## 2019-10-21 PROCEDURE — 74011250637 HC RX REV CODE- 250/637: Performed by: FAMILY MEDICINE

## 2019-10-21 PROCEDURE — 77010033678 HC OXYGEN DAILY

## 2019-10-21 PROCEDURE — 82962 GLUCOSE BLOOD TEST: CPT

## 2019-10-21 PROCEDURE — 80053 COMPREHEN METABOLIC PANEL: CPT

## 2019-10-21 PROCEDURE — 94760 N-INVAS EAR/PLS OXIMETRY 1: CPT

## 2019-10-21 PROCEDURE — 97535 SELF CARE MNGMENT TRAINING: CPT

## 2019-10-21 PROCEDURE — 74011000250 HC RX REV CODE- 250: Performed by: INTERNAL MEDICINE

## 2019-10-21 PROCEDURE — 97167 OT EVAL HIGH COMPLEX 60 MIN: CPT

## 2019-10-21 PROCEDURE — 65660000000 HC RM CCU STEPDOWN

## 2019-10-21 PROCEDURE — 83880 ASSAY OF NATRIURETIC PEPTIDE: CPT

## 2019-10-21 PROCEDURE — 74011250636 HC RX REV CODE- 250/636: Performed by: HOSPITALIST

## 2019-10-21 PROCEDURE — 83735 ASSAY OF MAGNESIUM: CPT

## 2019-10-21 RX ORDER — FUROSEMIDE 40 MG/1
40 TABLET ORAL DAILY
Status: DISCONTINUED | OUTPATIENT
Start: 2019-10-22 | End: 2019-10-22 | Stop reason: HOSPADM

## 2019-10-21 RX ADMIN — IPRATROPIUM BROMIDE AND ALBUTEROL SULFATE 3 ML: .5; 3 SOLUTION RESPIRATORY (INHALATION) at 07:09

## 2019-10-21 RX ADMIN — METHYLPREDNISOLONE SODIUM SUCCINATE 40 MG: 40 INJECTION, POWDER, FOR SOLUTION INTRAMUSCULAR; INTRAVENOUS at 14:31

## 2019-10-21 RX ADMIN — CEFTRIAXONE 1 G: 1 INJECTION, POWDER, FOR SOLUTION INTRAMUSCULAR; INTRAVENOUS at 19:19

## 2019-10-21 RX ADMIN — TAMSULOSIN HYDROCHLORIDE 0.4 MG: 0.4 CAPSULE ORAL at 19:18

## 2019-10-21 RX ADMIN — Medication 10 ML: at 23:08

## 2019-10-21 RX ADMIN — HEPARIN SODIUM 5000 UNITS: 5000 INJECTION INTRAVENOUS; SUBCUTANEOUS at 01:00

## 2019-10-21 RX ADMIN — IPRATROPIUM BROMIDE AND ALBUTEROL SULFATE 3 ML: .5; 3 SOLUTION RESPIRATORY (INHALATION) at 03:09

## 2019-10-21 RX ADMIN — DIPHENHYDRAMINE HYDROCHLORIDE 25 MG: 25 CAPSULE ORAL at 23:06

## 2019-10-21 RX ADMIN — METHYLPREDNISOLONE SODIUM SUCCINATE 40 MG: 40 INJECTION, POWDER, FOR SOLUTION INTRAMUSCULAR; INTRAVENOUS at 20:39

## 2019-10-21 RX ADMIN — HEPARIN SODIUM 5000 UNITS: 5000 INJECTION INTRAVENOUS; SUBCUTANEOUS at 10:49

## 2019-10-21 RX ADMIN — FUROSEMIDE 20 MG: 10 INJECTION, SOLUTION INTRAMUSCULAR; INTRAVENOUS at 10:49

## 2019-10-21 RX ADMIN — IPRATROPIUM BROMIDE AND ALBUTEROL SULFATE 3 ML: .5; 3 SOLUTION RESPIRATORY (INHALATION) at 15:42

## 2019-10-21 RX ADMIN — INSULIN LISPRO 4 UNITS: 100 INJECTION, SOLUTION INTRAVENOUS; SUBCUTANEOUS at 23:06

## 2019-10-21 RX ADMIN — HEPARIN SODIUM 5000 UNITS: 5000 INJECTION INTRAVENOUS; SUBCUTANEOUS at 19:19

## 2019-10-21 RX ADMIN — Medication 10 ML: at 05:48

## 2019-10-21 RX ADMIN — GUAIFENESIN 600 MG: 600 TABLET, EXTENDED RELEASE ORAL at 20:39

## 2019-10-21 RX ADMIN — Medication 10 ML: at 14:32

## 2019-10-21 RX ADMIN — AZITHROMYCIN MONOHYDRATE 500 MG: 500 INJECTION, POWDER, LYOPHILIZED, FOR SOLUTION INTRAVENOUS at 19:20

## 2019-10-21 RX ADMIN — IPRATROPIUM BROMIDE AND ALBUTEROL SULFATE 3 ML: .5; 3 SOLUTION RESPIRATORY (INHALATION) at 21:09

## 2019-10-21 RX ADMIN — METHYLPREDNISOLONE SODIUM SUCCINATE 60 MG: 125 INJECTION, POWDER, FOR SOLUTION INTRAMUSCULAR; INTRAVENOUS at 01:00

## 2019-10-21 RX ADMIN — GUAIFENESIN 600 MG: 600 TABLET, EXTENDED RELEASE ORAL at 10:49

## 2019-10-21 RX ADMIN — IPRATROPIUM BROMIDE AND ALBUTEROL SULFATE 3 ML: .5; 3 SOLUTION RESPIRATORY (INHALATION) at 11:21

## 2019-10-21 RX ADMIN — METHYLPREDNISOLONE SODIUM SUCCINATE 60 MG: 125 INJECTION, POWDER, FOR SOLUTION INTRAMUSCULAR; INTRAVENOUS at 05:48

## 2019-10-21 NOTE — PROGRESS NOTES
Problem: Falls - Risk of  Goal: *Absence of Falls  Description  Document Gretchen Sanderfabi Fall Risk and appropriate interventions in the flowsheet.   Outcome: Progressing Towards Goal  Note:   Fall Risk Interventions:  Pt calls for assistance  Mobility Interventions: Communicate number of staff needed for ambulation/transfer         Medication Interventions: Teach patient to arise slowly    Elimination Interventions: Call light in reach              Problem: Breathing Pattern - Ineffective  Goal: *Use of effective breathing techniques  Outcome: Progressing Towards Goal  Note:   Using pursed lip breathing, needs reenforcement

## 2019-10-21 NOTE — PROGRESS NOTES
1935 - Assumed care at this time. 2037 - Patient A&Ox4, 2L NC. Denies chest pain. Dyspnea on exertion and at rest. Denies numbness/tingling/calf pain. Pain 0/10. No concerns voiced. Call bell within reach, bed in lowest position. Pt encouraged to call for assistance.

## 2019-10-21 NOTE — PROGRESS NOTES
Problem: Falls - Risk of  Goal: *Absence of Falls  Description  Document Aishwarya Yoon Fall Risk and appropriate interventions in the flowsheet.   Outcome: Progressing Towards Goal  Note:   Fall Risk Interventions:  Mobility Interventions: Communicate number of staff needed for ambulation/transfer, Patient to call before getting OOB         Medication Interventions: Patient to call before getting OOB, Teach patient to arise slowly    Elimination Interventions: Call light in reach              Problem: Patient Education: Go to Patient Education Activity  Goal: Patient/Family Education  Outcome: Progressing Towards Goal     Problem: Pain  Goal: *Control of Pain  Outcome: Progressing Towards Goal     Problem: Patient Education: Go to Patient Education Activity  Goal: Patient/Family Education  Outcome: Progressing Towards Goal

## 2019-10-21 NOTE — PROGRESS NOTES
Hospitalist Progress Note    Patient: Ronit Segura MRN: [id number]  CSN: [id number]    YOB: 1945  Age: 66 y.o. Sex: male    DOA: 10/19/2019 LOS:  LOS: 2 days          Chief Complaint: Ac copd exacerbation      Assessment/Plan       1) Acute resp distress with chronic hypoxic resp failure and hypoxia -- improved     2) COPD exacerbation  -- improved-wean steroid dosing and continue 02 2 liters NC     3) Acute bronchitis --continue abx and change to PO on d/c     4)  elevated BS - due to steroids.  Cont ssi     5) BPH - on Flomax     6) LE edema -- echo repeated-last EF wnl, change lasix to PO    7)HTN-stable    Expect he can d/c home tomorrow on home 02 dosing and oral steroids if continues to improve    Disposition :  Patient Active Problem List   Diagnosis Code    Hypertension I10    COPD (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 ([facility]) J44.9    Chronic renal disease, stage 3, moderately decreased glomerular filtration rate between 30-59 mL/min/1.73 square meter ([facility]) N18.3    Asbestosis ([facility] J61    Acute exacerbation of chronic obstructive pulmonary disease (COPD) ([facility] J44.1    Advanced care planning/counseling discussion Z71.89    Debility R53.81    Pneumonia of right lower lobe due to methicillin susceptible Staphylococcus aureus (MSSA) ([facility] [address] J15.211    Paroxysmal atrial fibrillation ([facility]) I48.0    Chronic respiratory failure with hypoxia ([facility]) J96.11    COPD (chronic obstructive pulmonary disease) ([facility]) J44.9       Subjective:    Feeling better  Breathing easier  Prod cough, unchnaged    Review of systems:    Constitutional: denies fevers, chills  Cardiovascular: denies chest pain, palpitations  Gastrointestinal: denies nausea, vomiting      Vital signs/Intake and Output:  Visit Vitals  /76 (BP 1 Location: Right arm, BP Patient Position: At rest)   Pulse 87   Temp 98.7 °F (37.1 °C)   Resp 18   Ht 5' 8\" (1.727 m)   Wt 93.6 kg (206 lb 6.4 oz) SpO2 98%   BMI 31.38 kg/m²     Current Shift:  10/20 1901 - 10/21 0700  In: -   Out: 2400 [Urine:2400]  Last three shifts:  10/19 0701 - 10/20 1900  In: 4742 [P.O.:720; I.V.:500]  Out: 9319 [Urine:2555]    Exam:    General: elderly WM,  alert, NAD, OX3  CVS:Regular rate and rhythm, no M/R/G, S1/S2 heard, no thrill  Lungs:Coarse BS, no wheezes this am  Abdomen: Soft, Nontender, No distention, Normal Bowel sounds, No hepatomegaly  Extremities: No C/C/E, pulses palpable 2+  Neuro:grossly normal , follows commands  Psych:appropriate                Labs: Results:       Chemistry Recent Labs     10/21/19  0115 10/20/19  0105 10/19/19  1346   * 144* 103*   * 131* 130*   K 3.8 4.5 4.6   CL 99* 98* 97*   CO2 26 24 25   BUN 29* 24* 21*   CREA 1.17 1.20 1.21   CA 8.1* 8.1* 8.4*   AGAP 10 9 8   BUCR 25* 20 17   AP 74 80  --    TP 5.8* 5.8*  --    ALB 3.0* 3.0*  --    GLOB 2.8 2.8  --    AGRAT 1.1 1.1  --       CBC w/Diff Recent Labs     10/21/19  0115 10/20/19  0105 10/19/19  1346   WBC 11.8 10.4 12.0   RBC 3.47* 3.50* 3.60*   HGB 10.2* 10.2* 10.6*   HCT 30.1* 30.5* 31.3*    288 303   GRANS 91* 96* 70   LYMPH 5* 2* 11*   EOS 0 0 10*      Cardiac Enzymes No results for input(s): CPK, CKND1, WILLARD in the last 72 hours. No lab exists for component: CKRMB, TROIP   Coagulation No results for input(s): PTP, INR, APTT, INREXT in the last 72 hours. Lipid Panel No results found for: CHOL, CHOLPOCT, CHOLX, CHLST, CHOLV, 939461, HDL, HDLP, LDL, LDLC, DLDLP, 538748, VLDLC, VLDL, TGLX, TRIGL, TRIGP, TGLPOCT, CHHD, CHHDX   BNP No results for input(s): BNPP in the last 72 hours.    Liver Enzymes Recent Labs     10/21/19  0115   TP 5.8*   ALB 3.0*   AP 74   SGOT 8*      Thyroid Studies Lab Results   Component Value Date/Time    TSH 0.86 10/20/2019 01:05 AM        Procedures/imaging: see electronic medical records for all procedures/Xrays and details which were not copied into this note but were reviewed prior to creation of Tosha Loera MD

## 2019-10-21 NOTE — PROGRESS NOTES
Problem: Self Care Deficits Care Plan (Adult)  Goal: *Acute Goals and Plan of Care (Insert Text)  Description  Initial Occupational Therapy Goals (10/21/2019) Within 7 day(s):    1. Patient will perform grooming standing at sink with Supervision x 2-3 minutes for increased independence with ADLs. 2. Patient will perform UB dressing with setup for increased independence with ADLs. 3. Patient will perform LB dressing with setup & A/E PRN (& clothing modifications) for increased independence with ADLs. 4. Patient will perform all aspects of toileting with Supervision/mod I for increased independence in ADLs  5. Patient will independently apply energy conservation techniques with 1 verbal cue(s) for increased independence with ADLs. 6. Patient will utilize good body mechanics during ADLs with 1 verbal cue(s). 7. Patient will independently manage O2 tubing to safely ambulate to/from bathroom. Outcome: Progressing Towards Goal     OCCUPATIONAL THERAPY EVALUATION    Patient: Miryam Jimenez (59 y.o. male)  Date: 10/21/2019  [facility]: [facility] [J44.9]        Precautions:  Fall, Skin(O2)  PLOF: Patient requires intermittent assist from spouse for ADLs and IADLs d/t decreased functional endurance. Pt well-known to staff from previous admission    ASSESSMENT :  Based on the objective data described below, the patient presents with decreased functional endurance for standing ADLs. Pt well-known to this OT from previous admissions. On 08/21/19 Eval, pt was instructed to use RW more often than SPC and to utilize motorized w/c. Pt reports doing this, but also contends he doesn't get out much. Pt limited by severe [place] w/ B HA's affecting STM recall. Pt pleasant and motivated to maintain his function.  Pt w/ SOB/KUMAR, but 96-97% on 2L O2 nc.    Education: Patient instructed on home safety, body mechanics for optimal respiratory effort, Energy Conservation/Work Simplification Techniques, adaptive strategies and adaptive dressing techniques including clothing modifications with patient verbalizing understanding at this time. Patient will benefit from skilled intervention to address the above impairments. Patient's rehabilitation potential is considered to be Good  Factors which may influence rehabilitation potential include:   ? None noted  ? Mental ability/status  ? Medical condition  ? Home/family situation and support systems  ? Safety awareness  ? Pain tolerance/management  ? Other:      PLAN :  Recommendations and Planned Interventions:   ?               Self Care Training                  ? Therapeutic Activities  ? Functional Mobility Training   ? Cognitive Retraining  ? Therapeutic Exercises           ? Endurance Activities  ? Balance Training                    ? Neuromuscular Re-Education  ? Visual/Perceptual Training     ? Home Safety Training  ? Patient Education                   ? Family Training/Education  ? Other (comment):    Frequency/Duration: Patient will be followed by occupational therapy 1-2 times per day/1-2 days per week to address goals. Discharge Recommendations: Home Health  Further Equipment Recommendations for Discharge: To Be Determined (TBD) at next level of care       SUBJECTIVE:   Patient stated I don't really go out much.     OBJECTIVE DATA SUMMARY:     Past Medical History:   Diagnosis Date    Can[facility] ([facility]     prostate    [facility]     Hypertension     Nocturia     Pneumonia     Radiation effect      Past Surgical History:   Procedure Laterality Date    HX HERNIA REPAIR       Barriers to Learning/Limitations: yes;  sensory deficits-vision/hearing/speech and physical  Compensate with: visual, verbal, tactile, kinesthetic cues/model    Home Situation: supportive spouse & children & grand-dtr  Home Situation  Home Environment: Trailer/mobile home  # Steps to Enter: 6  Rails to Enter: Yes  Hand Rails : Bilateral  Wheelchair Ramp: No  One/Two Story Residence: One story  Living Alone: No  Support Systems: Spouse/Significant Other/Partner, Family member(s)  Patient Expects to be Discharged to[de-identified] Trailer/mobile home  Current DME Used/Available at Home: Walker, rolling, Oxygen, portable, Nebulizer, Shower chair  Tub or Shower Type: Shower  ? Right hand dominant   ? Left hand dominant    Cognitive/Behavioral Status:  Neurologic State: Alert  Orientation Level: Oriented X4  Cognition: Appropriate decision making; Follows commands  Safety/Judgement: Awareness of environment;Decreased insight into deficits; Decreased awareness of need for assistance;Decreased awareness of need for safety    Skin: L thigh blister d/t pt stating ice pack \"too cold\"  Edema: BLE edema mild    Vision/Perceptual:     Appears grossly WFL      Coordination: BUE  Coordination: Within functional limits  Fine Motor Skills-Upper: Left Intact; Right Intact    Gross Motor Skills-Upper: Left Intact; Right Intact    Balance:  Sitting: Intact  Standing: Intact; With support    Strength: BUE  Strength: Generally decreased, functional    Tone & Sensation: BUE  Tone: Normal  Sensation: Impaired    Range of Motion: BUE  AROM: Generally decreased, functional  PROM: Generally decreased, functional    Functional Mobility and Transfers for ADLs:  Bed Mobility:  Supine to Sit: Supervision  Scooting: Supervision  Transfers:  Sit to Stand: Contact guard assistance; Adaptive equipment  Bed to Chair: Contact guard assistance; Adaptive equipment   Toilet Transfer : Contact guard assistance; Adaptive equipment   Bathroom Mobility: Contact guard assistance    ADL Assessment:   Feeding: Independent    Oral Facial Hygiene/Grooming: Contact guard assistance    Bathing:  Moderate assistance; Additional time    Upper Body Dressing: Stand-by assistance;Setup    Lower Body Dressing: Moderate assistance    Toileting: Contact guard assistance    ADL Intervention:  Lower Body Dressing Assistance  Socks: Maximum assistance(normally uses slip-on shoes)  Position Performed: Seated edge of bed    Toileting  Bladder Hygiene: Stand-by assistance;Contact guard assistance(urinal)    Cognitive Retraining  Problem Solving: Inductive reason; Identifying the task; Identifying the problem;General alternative solution;Deductive reason; Awareness of environment  Executive Functions: Executing cognitive plans  Organizing/Sequencing: Breaking task down;Prioritizing  Safety/Judgement: Awareness of environment;Decreased insight into deficits; Decreased awareness of need for assistance;Decreased awareness of need for safety    Pain:  Pain level pre-treatment: 5/10 (reports arthritis)  Pain level post-treatment: 5/10   Pain Intervention(s): Medication provided by Nursing (see MAR); Rest, Repositioning   Response to intervention: Nurse notified, See doc flow sheet    Activity Tolerance:   Fair-. Patient able to stand 1-2 minute(s). Patient able to complete ADLs with frequent rest breaks. Patient limited by functional endurance, respiratory status, pain, strength. Please refer to the flowsheet for vital signs taken during this treatment. After treatment:   ? Patient left in no apparent distress sitting up in chair  ? Patient left in no apparent distress in bed  ? Call bell left within reach  ? Nursing notified  ? Caregiver present  ? Bed alarm activated    COMMUNICATION/EDUCATION:   ? Role of Occupational Therapy in the acute care setting  ? Home safety education was provided and the patient/caregiver indicated understanding. ? Patient/family have participated as able in goal setting and plan of care. ? Patient/family agree to work toward stated goals and plan of care.   ? Patient understands intent and goals of therapy, but is neutral about his/her participation. ? Patient is unable to participate in goal setting and plan of care. Thank you for this referral.  Yeyo Giraldo  Time Calculation: 24 mins    Eval Complexity: History: HIGH Complexity : Extensive review of history including physical, cognitive and psychosocial history ; Examination: HIGH Complexity : 5 or more performance deficits relating to physical, cognitive , or psychosocial skils that result in activity limitations and / or participation restrictions; Decision Making:HIGH Complexity : Patient presents with comorbidities that affect occupational performance.  Signifigant modification of tasks or assistance (eg, physical or verbal) with assessment (s) is necessary to enable patient to complete evaluation

## 2019-10-21 NOTE — PROGRESS NOTES
0747:Received verbal bedside report from off going nurse JACKIE Hopkins Patient care received. Patient alert and oriented x 4. Patient resting in bed denies pain. Patient stable. Call light with in reach bed in lowest position.

## 2019-10-21 NOTE — ROUTINE PROCESS
Bedside and Verbal shift change report given to Bowers RN (oncoming nurse) by Olga Lidia Hernández. Guido Jackson RN (offgoing nurse). Report included the following information SBAR, Kardex, Intake/Output and MAR.

## 2019-10-21 NOTE — PROGRESS NOTES
Problem: Mobility Impaired (Adult and Pediatric)  Goal: *Acute Goals and Plan of Care (Insert Text)  Description  Physical Therapy Goals  Initiated 10/21/2019 and to be accomplished within 3-7 day(s)  1. Patient will move from supine to sit and sit to supine  in bed with supervision/set-up. 2.  Patient will transfer from bed to chair and chair to bed with supervision/set-up using the least restrictive device. 3.  Patient will perform sit to stand with supervision/set-up. 4.  Patient will ambulate with supervision/set-up for 150 feet with the least restrictive device. 5.  Patient will negotiate 6 stairs with B handrails and supervision for safety, to allow home entry. Note:   PHYSICAL THERAPY EVALUATION    Patient: Annalisa Patel (79 y.o. male)  Date: 10/21/2019  Primary Diagnosis: [facility]ch[facility] [J44.9]  Precautions:   Fall, Skin(O2)    ASSESSMENT :  Based on the objective data described below, the patient presents with lower extremity weakness, decreased gait quality and endurance, impaired bed mobility and transfers, and overall limitations in functional mobility. Pt has arthritis in LE which limits ease of mobility. Pt lives with wife and ambulates with RW or SPC as needed. Pt performed supine to sit with supervision, sit to stand with CGA. Patient ambulated 100 feet with RW, GB applied, CGA, decreased step clearance, 2L O2 via NC, maintained mid 90s. Patient would benefit from skilled inpatient physical therapy to address deficits, progress as tolerated to achieve long term goals and allow safe discharge. Patient will benefit from skilled intervention to address the above impairments. Patients rehabilitation potential is considered to be Good  Factors which may influence rehabilitation potential include:   ? None noted  ? Mental ability/status  ? Medical condition  ? Home/family situation and support systems  ?          Safety awareness  ? Pain tolerance/management  ? Other:      PLAN :  Recommendations and Planned Interventions:  ?           Bed Mobility Training             ? Neuromuscular Re-Education  ? Transfer Training                   ? Orthotic/Prosthetic Training  ? Gait Training                          ? Modalities  ? Therapeutic Exercises          ? Edema Management/Control  ? Therapeutic Activities            ? Patient and Family Training/Education  ? Other (comment):    Frequency/Duration: Patient will be followed by physical therapy 1-2 times per day to address goals. Discharge Recommendations: Home Health  Further Equipment Recommendations for Discharge: N/A     SUBJECTIVE:   Patient stated I am doing pretty good today.     OBJECTIVE DATA SUMMARY:     Past Medical History:   Diagnosis Date    Cancer ([facility]     prostate    COPD (chronic obstructive pulmonary disease) ([facility]     Hypertension     Nocturia     Pneumonia     Radiation effect      Past Surgical History:   Procedure Laterality Date    HX HERNIA REPAIR       Barriers to Learning/Limitations: None  Compensate with: N/A  Prior Level of Function/Home Situation: Independent amb with RW/SPC  Home Situation  Home Environment: Trailer/mobile home  # Steps to Enter: 6  Rails to Enter: Yes  Hand Rails : Bilateral  Wheelchair Ramp: No  One/Two Story Residence: One story  Living Alone: No  Support Systems: Spouse/Significant Other/Partner, Family member(s)  Patient Expects to be Discharged to[de-identified] Trailer/mobile home  Current DME Used/Available at Home: Walker, rolling, Oxygen, portable, Nebulizer, Shower chair  Tub or Shower Type: Shower  Critical Behavior:  Neurologic State: Alert  Orientation Level: Oriented X4  Cognition: Appropriate decision making; Follows commands  Safety/Judgement: Awareness of environment;Decreased insight into deficits; Decreased awareness of need for assistance;Decreased awareness of need for safety  Psychosocial  Patient Behaviors: Calm; Cooperative  Needs Expressed: Educational  Purposeful Interaction: Yes  Pt Identified Daily Priority: Clinical issues (comment)  Caritas Process: Nurture loving kindness;Enable irais/hope;Establish trust;Nurture spiritual self;Teaching/learning; Attend basic human needs;Create healing environment  Caring Interventions: Reassure  Reassure: Therapeutic listening; Informing; Acceptance; Instilling irais and hope  Therapeutic Modalities: Deep breathing;Humor; Intentional therapeutic touch  Skin Condition/Temp: Fragile; Warm  Skin Integrity: Intact  Skin Integumentary  Skin Color: Appropriate for ethnicity; Ecchymosis (comment)(LUE)  Skin Condition/Temp: Fragile; Warm  Skin Integrity: Intact  Turgor: Epidermis thin w/ loss of subcut tissue  Hair Growth: Present  Varicosities: Absent  Strength:    Strength: Generally decreased, functional  Tone & Sensation:   Tone: Normal  Sensation: Impaired  Range Of Motion:  AROM: Generally decreased, functional  PROM: Generally decreased, functional  Functional Mobility:  Bed Mobility:  Supine to Sit: Supervision  Scooting: Supervision  Transfers:  Sit to Stand: Contact guard assistance  Stand to Sit: Contact guard assistance  Bed to Chair: Contact guard assistance; Adaptive equipment  Balance:   Sitting: Intact  Standing: Impaired; With support  Standing - Static: Good  Standing - Dynamic : Fair  Ambulation/Gait Training:  Distance (ft): 100 Feet (ft)  Assistive Device: Gait belt;Walker, rolling  Ambulation - Level of Assistance: Contact guard assistance  Gait Description (WDL): Exceptions to WDL  Gait Abnormalities: Decreased step clearance(forward flexed posture)  Base of Support: Widened  Stance: Weight shift  Speed/Lisbet: Slow;Shuffled  Step Length: Right shortened;Left shortened  Pain:  Pain Scale 1: Numeric (0 - 10)  Pain Intensity 1: 0  Activity Tolerance:   Good  Please refer to the flowsheet for vital signs taken during this treatment. After treatment:   ?         Patient left in no apparent distress sitting up in chair  ? Patient left in no apparent distress in bed  ? Call bell left within reach  ? Nursing notified  ? Caregiver present  ? Bed alarm activated    COMMUNICATION/EDUCATION:   ?         Fall prevention education was provided and the patient/caregiver indicated understanding. ? Patient/family have participated as able in goal setting and plan of care. ?         Patient/family agree to work toward stated goals and plan of care. ?         Patient understands intent and goals of therapy, but is neutral about his/her participation. ? Patient is unable to participate in goal setting and plan of care.     Thank you for this referral.  Malika Wood   Time Calculation: 15 mins   Eval Complexity: History: MEDIUM  Complexity : 1-2 comorbidities / personal factors will impact the outcome/ POC Exam:LOW Complexity : 1-2 Standardized tests and measures addressing body structure, function, activity limitation and / or participation in recreation  Presentation: LOW Complexity : Stable, uncomplicated  Clinical Decision Making:Low Complexity    Overall Complexity:LOW

## 2019-10-21 NOTE — PROGRESS NOTES
Transition of care: anticipate home when medically cleared  Met with patient at bedside then met with Dr. Uvaldo Manley for [facility]. Patient states he lives with his wife. He has home oxygen uses [employer] and has a nebulizer. Patient reports he has a cane/walker/ and has no other needs. He still drives. He has a ride home. Patient denies needs from cm. PT/OT eval for recommendations. Cm will continue to follow patient has Dr. Juventino Sims for pcp and medicare for insurance  Care Management Interventions  PCP Verified by CM: Yes  Mode of Transport at Discharge:  Other (see comment)(spouse to transport home at discharge)  Current Support Network: Lives with Spouse, Own Home  Confirm Follow Up Transport: Family  Plan discussed with Pt/Family/Caregiver: Yes  Discharge Location  Discharge Placement: Home

## 2019-10-21 NOTE — PROGRESS NOTES
Problem: Falls - Risk of  Goal: *Absence of Falls  Description  Document Tom Hawthorne Fall Risk and appropriate interventions in the flowsheet.   Outcome: Progressing Towards Goal  Note:   Fall Risk Interventions:  Mobility Interventions: Communicate number of staff needed for ambulation/transfer, Utilize walker, cane, or other assistive device, Utilize gait belt for transfers/ambulation         Medication Interventions: Teach patient to arise slowly, Patient to call before getting OOB    Elimination Interventions: Call light in reach              Problem: Patient Education: Go to Patient Education Activity  Goal: Patient/Family Education  Outcome: Progressing Towards Goal     Problem: Breathing Pattern - Ineffective  Goal: *Absence of hypoxia  Outcome: Progressing Towards Goal  Goal: *Use of effective breathing techniques  Outcome: Progressing Towards Goal  Goal: *PALLIATIVE CARE:  Alleviation of Dyspnea  Outcome: Progressing Towards Goal     Problem: Patient Education: Go to Patient Education Activity  Goal: Patient/Family Education  Outcome: Progressing Towards Goal     Problem: Pain  Goal: *Control of Pain  Outcome: Progressing Towards Goal     Problem: Patient Education: Go to Patient Education Activity  Goal: Patient/Family Education  Outcome: Progressing Towards Goal

## 2019-10-22 ENCOUNTER — HOME HEALTH ADMISSION (OUTPATIENT)
Dept: HOME HEALTH SERVICES | Facility: HOME HEALTH | Age: 74
End: 2019-10-22
Payer: MEDICARE

## 2019-10-22 VITALS
SYSTOLIC BLOOD PRESSURE: 148 MMHG | RESPIRATION RATE: 18 BRPM | TEMPERATURE: 98 F | OXYGEN SATURATION: 99 % | BODY MASS INDEX: 30.74 KG/M2 | DIASTOLIC BLOOD PRESSURE: 70 MMHG | WEIGHT: 202.82 LBS | HEART RATE: 82 BPM | HEIGHT: 68 IN

## 2019-10-22 LAB
GLUCOSE BLD STRIP.AUTO-MCNC: 117 MG/DL (ref 70–110)
GLUCOSE BLD STRIP.AUTO-MCNC: 148 MG/DL (ref 70–110)

## 2019-10-22 PROCEDURE — 82962 GLUCOSE BLOOD TEST: CPT

## 2019-10-22 PROCEDURE — 74011250636 HC RX REV CODE- 250/636: Performed by: HOSPITALIST

## 2019-10-22 PROCEDURE — 90686 IIV4 VACC NO PRSV 0.5 ML IM: CPT | Performed by: INTERNAL MEDICINE

## 2019-10-22 PROCEDURE — 74011250637 HC RX REV CODE- 250/637: Performed by: INTERNAL MEDICINE

## 2019-10-22 PROCEDURE — 97116 GAIT TRAINING THERAPY: CPT

## 2019-10-22 PROCEDURE — 74011250636 HC RX REV CODE- 250/636: Performed by: INTERNAL MEDICINE

## 2019-10-22 PROCEDURE — 74011000250 HC RX REV CODE- 250: Performed by: INTERNAL MEDICINE

## 2019-10-22 PROCEDURE — 94760 N-INVAS EAR/PLS OXIMETRY 1: CPT

## 2019-10-22 PROCEDURE — 3E02340 INTRODUCTION OF INFLUENZA VACCINE INTO MUSCLE, PERCUTANEOUS APPROACH: ICD-10-PCS | Performed by: INTERNAL MEDICINE

## 2019-10-22 PROCEDURE — 77010033678 HC OXYGEN DAILY

## 2019-10-22 PROCEDURE — 97110 THERAPEUTIC EXERCISES: CPT

## 2019-10-22 PROCEDURE — 94640 AIRWAY INHALATION TREATMENT: CPT

## 2019-10-22 PROCEDURE — 74011250637 HC RX REV CODE- 250/637: Performed by: HOSPITALIST

## 2019-10-22 PROCEDURE — 90471 IMMUNIZATION ADMIN: CPT

## 2019-10-22 RX ORDER — PREDNISONE 10 MG/1
TABLET ORAL
Qty: 20 TAB | Refills: 0 | Status: SHIPPED | OUTPATIENT
Start: 2019-10-22 | End: 2020-01-18

## 2019-10-22 RX ADMIN — IPRATROPIUM BROMIDE AND ALBUTEROL SULFATE 3 ML: .5; 3 SOLUTION RESPIRATORY (INHALATION) at 04:31

## 2019-10-22 RX ADMIN — METHYLPREDNISOLONE SODIUM SUCCINATE 40 MG: 40 INJECTION, POWDER, FOR SOLUTION INTRAMUSCULAR; INTRAVENOUS at 09:40

## 2019-10-22 RX ADMIN — Medication 10 ML: at 14:00

## 2019-10-22 RX ADMIN — IPRATROPIUM BROMIDE AND ALBUTEROL SULFATE 3 ML: .5; 3 SOLUTION RESPIRATORY (INHALATION) at 00:47

## 2019-10-22 RX ADMIN — INFLUENZA VIRUS VACCINE 0.5 ML: 15; 15; 15; 15 SUSPENSION INTRAMUSCULAR at 15:50

## 2019-10-22 RX ADMIN — IPRATROPIUM BROMIDE AND ALBUTEROL SULFATE 3 ML: .5; 3 SOLUTION RESPIRATORY (INHALATION) at 08:23

## 2019-10-22 RX ADMIN — Medication 10 ML: at 06:00

## 2019-10-22 RX ADMIN — HEPARIN SODIUM 5000 UNITS: 5000 INJECTION INTRAVENOUS; SUBCUTANEOUS at 09:39

## 2019-10-22 RX ADMIN — IPRATROPIUM BROMIDE AND ALBUTEROL SULFATE 3 ML: .5; 3 SOLUTION RESPIRATORY (INHALATION) at 11:24

## 2019-10-22 RX ADMIN — GUAIFENESIN 600 MG: 600 TABLET, EXTENDED RELEASE ORAL at 09:39

## 2019-10-22 RX ADMIN — FUROSEMIDE 40 MG: 40 TABLET ORAL at 09:39

## 2019-10-22 RX ADMIN — HEPARIN SODIUM 5000 UNITS: 5000 INJECTION INTRAVENOUS; SUBCUTANEOUS at 01:14

## 2019-10-22 RX ADMIN — METHYLPREDNISOLONE SODIUM SUCCINATE 40 MG: 40 INJECTION, POWDER, FOR SOLUTION INTRAMUSCULAR; INTRAVENOUS at 04:50

## 2019-10-22 NOTE — ROUTINE PROCESS
Bedside and Verbal shift change report given to BETTE Baugh R.N. (oncoming nurse) by CHRIS Grewal R.N. (offgoing nurse). Report included the following information SBAR, Kardex, Intake/Output, MAR, Accordion, Recent Results and Med Rec Status.

## 2019-10-22 NOTE — PROGRESS NOTES
Transition of care: anticipate d/c home today  Met with patient and Dr. Francie Velazquez at bedside. Patient is alert and oriented he is hard of hearing. Patient verbalized he agrees with d/c home. His wife will drive him home. Patient has medicare for insurance and has agreed to [facility] per foc. Patient  Has home oxygen, nebulizer, cane and a rw. Dr. Francie Velazquez would like patient to have follow up with Dr. Alejo Contreras and his pcp. Cms is aware and will assist. Patient does deny other needs from cm. He will notify his wife to pick him up. Care Management Interventions  PCP Verified by CM: Yes  Mode of Transport at Discharge:  Other (see comment)(spouse to transport home at discharge)  Transition of Care Consult (CM Consult): [address]: Yes  Physical Therapy Consult: Yes  Occupational Therapy Consult: Yes  Current Support Network: Lives with Spouse  Confirm Follow Up Transport: Family  Plan discussed with Pt/Family/Caregiver: Yes  Freedom of Choice Offered: Yes  Discharge Location  Discharge Placement: Home with home health

## 2019-10-22 NOTE — PROGRESS NOTES
Problem: Mobility Impaired (Adult and Pediatric)  Goal: *Acute Goals and Plan of Care (Insert Text)  Description  Physical Therapy Goals  Initiated 10/21/2019 and to be accomplished within 3-7 day(s)  1. Patient will move from supine to sit and sit to supine  in bed with supervision/set-up. 2.  Patient will transfer from bed to chair and chair to bed with supervision/set-up using the least restrictive device. 3.  Patient will perform sit to stand with supervision/set-up. 4.  Patient will ambulate with supervision/set-up for 150 feet with the least restrictive device. 5.  Patient will negotiate 6 stairs with B handrails and supervision for safety, to allow home entry. Note:   PHYSICAL THERAPY TREATMENT    Patient: Lotus Singh (9 y.o. male)  Date: 10/22/2019  Diagnosis: [facility]ch[facility] [J44.9] <principal problem not specified>  Precautions: Fall, Skin(O2)   Chart, physical therapy assessment, plan of care and goals were reviewed. ASSESSMENT:  Pt increased ambulation distance in hallway with RW, no c/o discomfort, fatigue noted, minimal SOB and SPO2 in mid 90s with 2L O2 via NC. Pt tolerated seated there ex with no c/o pain. Will continue to progress as tolerated. Progression toward goals:  ?      Improving appropriately and progressing toward goals  ? Improving slowly and progressing toward goals  ? Not making progress toward goals and plan of care will be adjusted     PLAN:  Patient continues to benefit from skilled intervention to address the above impairments. Continue treatment per established plan of care. Discharge Recommendations:  Home Health  Further Equipment Recommendations for Discharge:  N/A     SUBJECTIVE:   Patient stated I am doing pretty good today.     OBJECTIVE DATA SUMMARY:   Critical Behavior:  Neurologic State: Appropriate for age  Orientation Level: Appropriate for age  Cognition: Appropriate decision making, Appropriate for age attention/concentration, Appropriate safety awareness, Follows commands  Safety/Judgement: Awareness of environment, Decreased insight into deficits, Decreased awareness of need for assistance, Decreased awareness of need for safety  Functional Mobility Training:  Bed Mobility:  Supine to Sit: Supervision  Transfers:  Sit to Stand: Contact guard assistance  Stand to Sit: Contact guard assistance  Balance:  Sitting: Intact  Standing: Intact; With support  Ambulation/Gait Training:  Distance (ft): 120 Feet (ft)  Assistive Device: Gait belt;Walker, rolling  Ambulation - Level of Assistance: Contact guard assistance  Gait Abnormalities: Decreased step clearance  Base of Support: Widened  Stance: Weight shift  Speed/Lisbet: Slow;Shuffled  Step Length: Right shortened;Left shortened  Therapeutic Exercises:   Therapeutic Exercises:       EXERCISE   Sets   Reps   Active Active Assist   Passive Self ROM   Comments   Ankle Pumps    ? ? ? ?    Quad Sets/Glut Sets   ? ? ? ? Hamstring Sets   ? ? ? ? Short Arc Quads   ? ? ? ? Heel Slides   ? ? ? ? Straight Leg Raises   ? ? ? ? Hip Abd/Add  10 ? ? ? ? Long Arc Quads  10 ? ? ? ? Seated Marching  10 ? ? ? ? Standing Marching  10 ? ? ? ?       ? ? ? ? Pain:  Pain Scale 1: Numeric (0 - 10)  Pain Intensity 1: 0  Activity Tolerance:   Good  Please refer to the flowsheet for vital signs taken during this treatment. After treatment:   ? Patient left in no apparent distress sitting up in chair  ? Patient left in no apparent distress in bed  ? Call bell left within reach  ? Nursing notified  ? Caregiver present  ?  Bed alarm activated      Torito Wood   Time Calculation: 25 mins

## 2019-10-22 NOTE — PROGRESS NOTES
0730: Bedside and Verbal shift change report given to Madison Marroquin RN (oncoming nurse) by Alina Garrett RN (offgoing nurse). Report included the following information Kardex, MAR, Cardiac Rhythm NSR and Alarm Parameters .      Pt repeat back fall prevention interventions and practice them accordingly, pt aware of the importance of wearing oxygen and practice accordingly     1330: discharge order noted via MD Jennifer Ramirez, pt made aware and agrees to discharge  Madison Marroquin RN    [id number]: pt transportation arrives, discharge instructions reviewed, no questions noted for this nurse, transport paged, no c/o health abnormalities at time of discharge  Madison Marroquin RN

## 2019-10-22 NOTE — ROUTINE PROCESS
Bedside and Verbal shift change report given to Carson Lujan RN by Tracie Gallegos RN. Report included the following information SBAR, Kardex, OR Summary, Intake/Output and MAR.

## 2019-10-22 NOTE — DISCHARGE SUMMARY
Discharge Summary    Patient: Chip Soto MRN: [id number]  CSN: [id number]    YOB: 1945  Age: 66 y.o.   Sex: male    DOA: 10/19/2019 LOS:  LOS: 3 days   Discharge Date: 10/22/2019     Primary Care Provider:  Julienne Sanchez MD    Admission Diagnoses: COPD (chronic obstructive pulmonary disease) ([facility] [J44.9]    Discharge Diagnoses:    Problem List as of 10/22/2019 Date Reviewed: 7/6/2019          Codes Class Noted - Resolved    COPD (chronic obstructive pulmonary disease) ([facility] ICD-10-CM: J44.9  ICD-9-CM: 496  10/19/2019 - Present        Pneumonia of right lower lobe due to methicillin susceptible Staphylococcus aureus (MSSA) ([facility] ICD-10-CM: O17.945  ICD-9-CM: 482.41  8/19/2019 - Present        Paroxysmal atrial fibrillation ([facility] ICD-10-CM: I48.0  ICD-9-CM: 427.31  8/19/2019 - Present        Chronic respiratory failure with hypoxia ([facility] ICD-10-CM: J96.11  ICD-9-CM: 518.83, 799.02  8/19/2019 - Present        Advanced care planning/counseling discussion ICD-10-CM: Z71.89  ICD-9-CM: V65.49  7/8/2019 - Present        Debility ICD-10-CM: R53.81  ICD-9-CM: 799.3  7/8/2019 - Present        Acute exacerbation of chronic obstructive pulmonary disease (COPD) ([facility] ICD-10-CM: J44.1  ICD-9-CM: 491.21  2/8/2019 - Present        Asbestosis ([facility] ICD-10-CM: I28  ICD-9-CM: 575  10/19/2018 - Present        Chronic renal disease, stage 3, moderately decreased glomerular filtration rate between 30-59 mL/min/1.73 square meter (HCC) ICD-10-CM: N18.3  ICD-9-CM: 585.3  7/20/2018 - Present        COPD (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 (HCC) ICD-10-CM: J44.9  ICD-9-CM: 491.20  4/20/2018 - Present        Hypertension ICD-10-CM: I10  ICD-9-CM: 401.9  Unknown - Present        RESOLVED: COPD with asthma and status asthmaticus ([facility] ICD-10-CM: J44.9, J45.902  ICD-9-CM: 493.21  7/20/2018 - 2/8/2019        RESOLVED: Status asthmaticus with COPD (chronic obstructive pulmonary disease) ([facility] ICD-10-CM: J44.9, J45.902  ICD-9-CM: 493.21  4/20/2018 - 2/8/2019        RESOLVED: PVC's (premature ventricular contractions) ICD-10-CM: I49.3  ICD-9-CM: 427.69  4/20/2018 - 2/8/2019        RESOLVED: Acute respiratory failure ([facility] ICD-10-CM: J96.00  ICD-9-CM: 518.81  10/2/2014 - 3/18/2017        RESOLVED: URIEL (acute kidney injury) ([facility] ICD-10-CM: N17.9  ICD-9-CM: 584.9  10/2/2014 - 2/8/2019        RESOLVED: Pneumonia ICD-10-CM: J18.9  ICD-9-CM: 037  10/2/2014 - 3/18/2017              Discharge Medications:     Discharge Medication List as of 10/22/2019  4:00 PM      CONTINUE these medications which have CHANGED    Details   predniSONE (DELTASONE) 10 mg tablet Prednisone 10mg tabs: p.o.  4 tabs daily for 2 days then drop to   3 tabs daily for 2 days then drop to   2 tabs daily for 2 days then drop to   1 tab daily for 2 days then stop. Dispense 20 tabs, Print, Disp-20 Tab, R-0         CONTINUE these medications which have NOT CHANGED    Details   OXYGEN-AIR DELIVERY SYSTEMS Take 2 L by inhalation continuous. , Historical Med      fluticasone propionate (FLONASE) 50 mcg/actuation nasal spray 2 Sprays by Both Nostrils route daily. , Historical Med      dextran 70/hypromellose (ARTIFICIAL TEARS, PF, OP) Apply  to eye as needed., Historical Med      diphenhydrAMINE (BENADRYL) 25 mg capsule Take 25 mg by mouth nightly as needed for Itching., Historical Med      albuterol-ipratropium (DUO-NEB) 2.5 mg-0.5 mg/3 ml nebu 3 mL by Nebulization route every four (4) hours as needed. , Print, Disp-30 Nebule, R-0      albuterol (PROVENTIL HFA, VENTOLIN HFA, PROAIR HFA) 90 mcg/actuation inhaler Take 2 Puffs by inhalation every four (4) hours as needed for Wheezing or Shortness of Breath., Normal, Disp-1 Inhaler, R-1      ipratropium (ATROVENT) 0.03 % nasal spray 2 Sprays by Both Nostrils route every twelve (12) hours as needed., Historical Med      tamsulosin (FLOMAX) 0.4 mg capsule Take 0.4 mg by mouth every evening., Historical Med      chlorthalidone (HYGROTEN) 25 mg tablet Take  by mouth daily. , Historical Med         STOP taking these medications       azithromycin (ZITHROMAX) 250 mg tablet Comments:   Reason for Stopping:               Discharge Condition: Good    Procedures : None    Consults: None      PHYSICAL EXAM   Visit Vitals  /70   Pulse 82   Temp 98 °F (36.7 °C)   Resp 18   Ht 5' 8\" (1.727 m)   Wt 92 kg (202 lb 13.2 oz)   SpO2 99%   BMI 30.84 kg/m²     General: Awake, cooperative, no acute distress    HEENT: NC, Atraumatic. PERRLA, EOMI. Anicteric sclerae. Lungs:  CTA Bilaterally. No Wheezing/Rhonchi/Rales. Heart:  Regular  rhythm,  No murmur, No Rubs, No Gallops  Abdomen: Soft, Non distended, Non tender. +Bowel sounds,   Extremities: No c/c/e  Psych:   Not anxious or agitated. Neurologic:  No acute neurological deficits. Admission HPI :   Octavio Frankel is a 66 y.o. male being admitted to the hospital with worsening sob and wheezing. He was last admitted here in Sept with [facility] hosp. He is on home oxygen. He was brought to ER after having increasig sob and resp distress. PT was given duoneb, solumedrol en route which has helped    Hospital Course :   Mr. Brii Coyle was admitted to telemetry, he did not had any acute events during hospitalization. Chronic respiratory failure - continued with home oxygen at 2L NC     COPD exacerbation - he was start on Intravenous steroids, Inhaled short acting beta 2 agonist and anticholinergics and Empiric antibiotics. His breathing improved significantly, he will be discharged on prednisone taper dose.  He will need follow up with Dr. Jojo Allen.      PAF - maintained NSR     HTN - controlled on home medications.       Activity: Activity as tolerated    Diet: Cardiac Diet    Follow-up: PCP, pulmonary    Disposition: home    Minutes spent on discharge: 45       Labs: Results:       Chemistry Recent Labs     10/21/19  0115 10/20/19  0105   * 144* * 131*   K 3.8 4.5   CL 99* 98*   CO2 26 24   BUN 29* 24*   CREA 1.17 1.20   CA 8.1* 8.1*   AGAP 10 9   BUCR 25* 20   AP 74 80   TP 5.8* 5.8*   ALB 3.0* 3.0*   GLOB 2.8 2.8   AGRAT 1.1 1.1      CBC w/Diff Recent Labs     10/21/19  0115 10/20/19  0105   WBC 11.8 10.4   RBC 3.47* 3.50*   HGB 10.2* 10.2*   HCT 30.1* 30.5*    288   GRANS 91* 96*   LYMPH 5* 2*   EOS 0 0      Cardiac Enzymes No results for input(s): CPK, CKND1, WILLARD in the last 72 hours. No lab exists for component: CKRMB, TROIP   Coagulation No results for input(s): PTP, INR, APTT, INREXT, INREXT in the last 72 hours. Lipid Panel No results found for: CHOL, CHOLPOCT, CHOLX, CHLST, CHOLV, 790735, HDL, HDLP, LDL, LDLC, DLDLP, 154169, VLDLC, VLDL, TGLX, TRIGL, TRIGP, TGLPOCT, CHHD, CHHDX   BNP No results for input(s): BNPP in the last 72 hours. Liver Enzymes Recent Labs     10/21/19  0115   TP 5.8*   ALB 3.0*   AP 74   SGOT 8*      Thyroid Studies Lab Results   Component Value Date/Time    TSH 0.86 10/20/2019 01:05 AM            Significant Diagnostic Studies: Xr Chest Pa Lat    Result Date: 8/21/2019  EXAM: XR CHEST PA LAT CLINICAL INDICATION/HISTORY: Right lung opacities, chronic obstructive pulmonary disease, pneumonia -Additional: None COMPARISON: 8/19/2019 TECHNIQUE: Portable frontal view of the chest _______________ FINDINGS: SUPPORT DEVICES: None. HEART AND MEDIASTINUM: Stable appearing cardiac size and mediastinal contours. LUNGS AND PLEURAL SPACES: Redemonstration of asymmetric opacity at the right lung base which is slightly increased in appearance from immediate comparison examination. Pleural-based calcific density unchanged. No evidence of pneumothorax or pleural effusion. BONY THORAX AND SOFT TISSUES: No acute osseous abnormality. _______________     IMPRESSION: 1. Mild interval increase in asymmetric opacity at the right lung base, which may reflect mild interval worsening of airspace disease and/or atelectasis.      Eliza Russ Chest Port    Result Date: 9/6/2019  EXAM: XR CHEST PORT CLINICAL INDICATION/HISTORY: Shortness of breath -Additional: None COMPARISON: Several prior exams, most recently 8/21/2019 TECHNIQUE: Portable frontal view of the chest _______________ FINDINGS: SUPPORT DEVICES: None. HEART AND MEDIASTINUM: Table appearing cardiac size and mediastinal contours. LUNGS AND PLEURAL SPACES: Mild elevation of the right hemidiaphragm with faint asymmetric opacity the right lung base, appearing improved. Partially calcified pleural-based nodule object over the anterior to mid right lung demonstrated, unchanged. No pneumothorax or pleural effusion. BONY THORAX AND SOFT TISSUES: No acute osseous abnormality. _______________     IMPRESSION: 1. Mild atelectatic opacity at the right lung base, improved from prior examination without superimposed acute abnormality or significant interval change from prior. Xr Chest Port    Result Date: 8/19/2019  EXAM: XR CHEST PORT CLINICAL INDICATION/HISTORY: Respiratory distress -Additional: Chronic obstructive pulmonary disease COMPARISON: Several prior exams, most recently chest radiograph 7/12/2019 as well as prior CT scan of the chest 7/7/2019 TECHNIQUE: Frontal view of the chest _______________ FINDINGS: HEART AND MEDIASTINUM: Normal appearing cardiac size and mediastinal contours. LUNGS AND PLEURAL SPACES: Asymmetric opacity the right lung base is noted. Pleural-based calcification involving the anterior right hemithorax as noted on multiple prior exams. No pneumothorax. No definite pleural effusion. BONY THORAX AND SOFT TISSUES: No acute osseous abnormality _______________     IMPRESSION: 1. Mild asymmetric opacity at the right mid and lower lung base, potentially atelectasis or airspace disease. 2. Elliptical partially calcified right pleural based lesion unchanged. No results found for this or any previous visit.         CC: Eliza Aguilar MD

## 2019-10-23 ENCOUNTER — HOME CARE VISIT (OUTPATIENT)
Dept: HOME HEALTH SERVICES | Facility: HOME HEALTH | Age: 74
End: 2019-10-23
Payer: MEDICARE

## 2019-10-23 ENCOUNTER — HOME CARE VISIT (OUTPATIENT)
Dept: SCHEDULING | Facility: HOME HEALTH | Age: 74
End: 2019-10-23
Payer: MEDICARE

## 2019-10-23 PROCEDURE — 3331090001 HH PPS REVENUE CREDIT

## 2019-10-23 PROCEDURE — 400013 HH SOC

## 2019-10-23 PROCEDURE — G0299 HHS/HOSPICE OF RN EA 15 MIN: HCPCS

## 2019-10-23 PROCEDURE — 3331090002 HH PPS REVENUE DEBIT

## 2019-10-24 ENCOUNTER — HOME CARE VISIT (OUTPATIENT)
Dept: HOME HEALTH SERVICES | Facility: HOME HEALTH | Age: 74
End: 2019-10-24
Payer: MEDICARE

## 2019-10-24 VITALS
OXYGEN SATURATION: 98 % | SYSTOLIC BLOOD PRESSURE: 123 MMHG | RESPIRATION RATE: 24 BRPM | HEART RATE: 82 BPM | HEIGHT: 68 IN | TEMPERATURE: 97.3 F | WEIGHT: 200 LBS | DIASTOLIC BLOOD PRESSURE: 69 MMHG | BODY MASS INDEX: 30.31 KG/M2

## 2019-10-24 PROCEDURE — 3331090002 HH PPS REVENUE DEBIT

## 2019-10-24 PROCEDURE — 3331090001 HH PPS REVENUE CREDIT

## 2019-10-25 ENCOUNTER — HOME CARE VISIT (OUTPATIENT)
Dept: SCHEDULING | Facility: HOME HEALTH | Age: 74
End: 2019-10-25
Payer: MEDICARE

## 2019-10-25 ENCOUNTER — HOME CARE VISIT (OUTPATIENT)
Dept: HOME HEALTH SERVICES | Facility: HOME HEALTH | Age: 74
End: 2019-10-25
Payer: MEDICARE

## 2019-10-25 PROCEDURE — 3331090002 HH PPS REVENUE DEBIT

## 2019-10-25 PROCEDURE — G0495 RN CARE TRAIN/EDU IN HH: HCPCS

## 2019-10-25 PROCEDURE — 3331090001 HH PPS REVENUE CREDIT

## 2019-10-26 VITALS
SYSTOLIC BLOOD PRESSURE: 148 MMHG | TEMPERATURE: 97.7 F | OXYGEN SATURATION: 97 % | DIASTOLIC BLOOD PRESSURE: 74 MMHG | HEART RATE: 76 BPM | RESPIRATION RATE: 20 BRPM

## 2019-10-26 PROCEDURE — 3331090002 HH PPS REVENUE DEBIT

## 2019-10-26 PROCEDURE — 3331090001 HH PPS REVENUE CREDIT

## 2019-10-27 PROCEDURE — 3331090001 HH PPS REVENUE CREDIT

## 2019-10-27 PROCEDURE — 3331090002 HH PPS REVENUE DEBIT

## 2019-10-28 ENCOUNTER — HOME CARE VISIT (OUTPATIENT)
Dept: SCHEDULING | Facility: HOME HEALTH | Age: 74
End: 2019-10-28
Payer: MEDICARE

## 2019-10-28 PROCEDURE — 3331090001 HH PPS REVENUE CREDIT

## 2019-10-28 PROCEDURE — G0152 HHCP-SERV OF OT,EA 15 MIN: HCPCS

## 2019-10-28 PROCEDURE — 3331090002 HH PPS REVENUE DEBIT

## 2019-10-29 ENCOUNTER — HOME CARE VISIT (OUTPATIENT)
Dept: HOME HEALTH SERVICES | Facility: HOME HEALTH | Age: 74
End: 2019-10-29
Payer: MEDICARE

## 2019-10-29 ENCOUNTER — HOME CARE VISIT (OUTPATIENT)
Dept: SCHEDULING | Facility: HOME HEALTH | Age: 74
End: 2019-10-29
Payer: MEDICARE

## 2019-10-29 VITALS
RESPIRATION RATE: 20 BRPM | DIASTOLIC BLOOD PRESSURE: 71 MMHG | HEART RATE: 76 BPM | SYSTOLIC BLOOD PRESSURE: 140 MMHG | OXYGEN SATURATION: 98 % | TEMPERATURE: 97.3 F

## 2019-10-29 VITALS
HEART RATE: 80 BPM | SYSTOLIC BLOOD PRESSURE: 122 MMHG | OXYGEN SATURATION: 98 % | TEMPERATURE: 98.4 F | DIASTOLIC BLOOD PRESSURE: 70 MMHG

## 2019-10-29 PROCEDURE — 3331090002 HH PPS REVENUE DEBIT

## 2019-10-29 PROCEDURE — G0495 RN CARE TRAIN/EDU IN HH: HCPCS

## 2019-10-29 PROCEDURE — 3331090001 HH PPS REVENUE CREDIT

## 2019-10-30 ENCOUNTER — HOME CARE VISIT (OUTPATIENT)
Dept: SCHEDULING | Facility: HOME HEALTH | Age: 74
End: 2019-10-30
Payer: MEDICARE

## 2019-10-30 ENCOUNTER — HOSPITAL ENCOUNTER (INPATIENT)
Age: 74
LOS: 3 days | Discharge: NURSING, REHAB OR LONG-TERM CARE | DRG: 189 | End: 2019-11-02
Attending: EMERGENCY MEDICINE | Admitting: HOSPITALIST
Payer: MEDICARE

## 2019-10-30 ENCOUNTER — APPOINTMENT (OUTPATIENT)
Dept: GENERAL RADIOLOGY | Age: 74
DRG: 189 | End: 2019-10-30
Attending: EMERGENCY MEDICINE
Payer: MEDICARE

## 2019-10-30 DIAGNOSIS — Z71.89 GOALS OF CARE, COUNSELING/DISCUSSION: ICD-10-CM

## 2019-10-30 DIAGNOSIS — R53.81 DEBILITY: ICD-10-CM

## 2019-10-30 DIAGNOSIS — J96.21 ACUTE ON CHRONIC RESPIRATORY FAILURE WITH HYPOXIA (HCC): Primary | ICD-10-CM

## 2019-10-30 DIAGNOSIS — J44.1 ACUTE EXACERBATION OF CHRONIC OBSTRUCTIVE PULMONARY DISEASE (COPD) (HCC): ICD-10-CM

## 2019-10-30 DIAGNOSIS — I48.91 ATRIAL FIBRILLATION WITH RVR (HCC): ICD-10-CM

## 2019-10-30 PROBLEM — J96.90 RESPIRATORY FAILURE (HCC): Status: ACTIVE | Noted: 2019-10-30

## 2019-10-30 PROBLEM — J96.22 ACUTE ON CHRONIC RESPIRATORY FAILURE WITH HYPOXIA AND HYPERCAPNIA (HCC): Status: ACTIVE | Noted: 2019-10-30

## 2019-10-30 LAB
ALBUMIN SERPL-MCNC: 3.7 G/DL (ref 3.4–5)
ALBUMIN/GLOB SERPL: 1.4 {RATIO} (ref 0.8–1.7)
ALP SERPL-CCNC: 80 U/L (ref 45–117)
ALT SERPL-CCNC: 19 U/L (ref 16–61)
ANION GAP BLD CALC-SCNC: 12 MMOL/L (ref 10–20)
ANION GAP SERPL CALC-SCNC: 4 MMOL/L (ref 3–18)
APTT PPP: 22.8 SEC (ref 23–36.4)
ARTERIAL PATENCY WRIST A: ABNORMAL
ARTERIAL PATENCY WRIST A: YES
ARTERIAL PATENCY WRIST A: YES
AST SERPL-CCNC: 10 U/L (ref 10–38)
ATRIAL RATE: 110 BPM
ATRIAL RATE: 357 BPM
AVAPS, IAVAPS: YES
AVAPS, IAVAPS: YES
BASE EXCESS BLD CALC-SCNC: 0 MMOL/L
BASE EXCESS BLD CALC-SCNC: 0 MMOL/L
BASE EXCESS BLD CALC-SCNC: 2 MMOL/L
BASOPHILS # BLD: 0 K/UL (ref 0–0.1)
BASOPHILS NFR BLD: 0 % (ref 0–3)
BDY SITE: ABNORMAL
BILIRUB SERPL-MCNC: 0.4 MG/DL (ref 0.2–1)
BNP SERPL-MCNC: 175 PG/ML (ref 0–1800)
BODY TEMPERATURE: 97.4
BUN BLD-MCNC: 30 MG/DL (ref 7–18)
BUN SERPL-MCNC: 30 MG/DL (ref 7–18)
BUN/CREAT SERPL: 24 (ref 12–20)
CA-I BLD-MCNC: 1.15 MMOL/L (ref 1.12–1.32)
CALCIUM SERPL-MCNC: 8.1 MG/DL (ref 8.5–10.1)
CALCULATED R AXIS, ECG10: 62 DEGREES
CALCULATED R AXIS, ECG10: 62 DEGREES
CALCULATED T AXIS, ECG11: 72 DEGREES
CALCULATED T AXIS, ECG11: 89 DEGREES
CHLORIDE BLD-SCNC: 100 MMOL/L (ref 100–108)
CHLORIDE SERPL-SCNC: 103 MMOL/L (ref 100–111)
CO2 BLD-SCNC: 29 MMOL/L (ref 19–24)
CO2 SERPL-SCNC: 30 MMOL/L (ref 21–32)
CREAT SERPL-MCNC: 1.24 MG/DL (ref 0.6–1.3)
CREAT UR-MCNC: 1.3 MG/DL (ref 0.6–1.3)
D DIMER PPP FEU-MCNC: 0.5 UG/ML(FEU)
DIAGNOSIS, 93000: NORMAL
DIAGNOSIS, 93000: NORMAL
DIFFERENTIAL METHOD BLD: ABNORMAL
EOSINOPHIL # BLD: 0.6 K/UL (ref 0–0.4)
EOSINOPHIL NFR BLD: 3 % (ref 0–5)
ERYTHROCYTE [DISTWIDTH] IN BLOOD BY AUTOMATED COUNT: 14.5 % (ref 11.6–14.5)
GAS FLOW.O2 O2 DELIVERY SYS: ABNORMAL L/MIN
GLOBULIN SER CALC-MCNC: 2.7 G/DL (ref 2–4)
GLUCOSE BLD STRIP.AUTO-MCNC: 155 MG/DL (ref 74–106)
GLUCOSE BLD STRIP.AUTO-MCNC: 156 MG/DL (ref 70–110)
GLUCOSE SERPL-MCNC: 154 MG/DL (ref 74–99)
HCO3 BLD-SCNC: 25.6 MMOL/L (ref 22–26)
HCO3 BLD-SCNC: 27.5 MMOL/L (ref 22–26)
HCO3 BLD-SCNC: 30.1 MMOL/L (ref 22–26)
HCT VFR BLD AUTO: 37.7 % (ref 36–48)
HCT VFR BLD CALC: 46 % (ref 36–49)
HGB BLD-MCNC: 12 G/DL (ref 13–16)
HGB BLD-MCNC: 15.6 G/DL (ref 12–16)
LACTATE BLD-SCNC: 0.72 MMOL/L (ref 0.4–2)
LYMPHOCYTES # BLD: 5.8 K/UL (ref 0.8–3.5)
LYMPHOCYTES NFR BLD: 31 % (ref 20–51)
MCH RBC QN AUTO: 29.6 PG (ref 24–34)
MCHC RBC AUTO-ENTMCNC: 31.8 G/DL (ref 31–37)
MCV RBC AUTO: 92.9 FL (ref 74–97)
MONOCYTES # BLD: 1.9 K/UL (ref 0–1)
MONOCYTES NFR BLD: 10 % (ref 2–9)
NEUTS BAND NFR BLD MANUAL: 1 % (ref 0–5)
NEUTS SEG # BLD: 10.2 K/UL (ref 1.8–8)
NEUTS SEG NFR BLD: 55 % (ref 42–75)
O2/TOTAL GAS SETTING VFR VENT: 0.3 %
O2/TOTAL GAS SETTING VFR VENT: 100 %
O2/TOTAL GAS SETTING VFR VENT: 50 %
PCO2 BLD: 43.5 MMHG (ref 35–45)
PCO2 BLD: 63.9 MMHG (ref 35–45)
PCO2 BLD: 79.2 MMHG (ref 35–45)
PEEP RESPIRATORY: 6 CMH2O
PEEP RESPIRATORY: 7 CMH2O
PEEP RESPIRATORY: 8 CMH2O
PH BLD: 7.19 [PH] (ref 7.35–7.45)
PH BLD: 7.24 [PH] (ref 7.35–7.45)
PH BLD: 7.38 [PH] (ref 7.35–7.45)
PIP ISTAT,IPIP: 11
PIP ISTAT,IPIP: 12
PIP ISTAT,IPIP: 16
PLATELET # BLD AUTO: 390 K/UL (ref 135–420)
PMV BLD AUTO: 8.8 FL (ref 9.2–11.8)
PO2 BLD: 105 MMHG (ref 80–100)
PO2 BLD: 128 MMHG (ref 80–100)
PO2 BLD: 512 MMHG (ref 80–100)
POTASSIUM BLD-SCNC: 4.7 MMOL/L (ref 3.5–5.5)
POTASSIUM SERPL-SCNC: 4.9 MMOL/L (ref 3.5–5.5)
PRESSURE SUPPORT SETTING VENT: 5 CMH2O
PROT SERPL-MCNC: 6.4 G/DL (ref 6.4–8.2)
Q-T INTERVAL, ECG07: 276 MS
Q-T INTERVAL, ECG07: 294 MS
QRS DURATION, ECG06: 74 MS
QRS DURATION, ECG06: 82 MS
QTC CALCULATION (BEZET), ECG08: 381 MS
QTC CALCULATION (BEZET), ECG08: 452 MS
RBC # BLD AUTO: 4.06 M/UL (ref 4.7–5.5)
RBC MORPH BLD: ABNORMAL
SAO2 % BLD: 100 % (ref 92–97)
SAO2 % BLD: 97 % (ref 92–97)
SAO2 % BLD: 99 % (ref 92–97)
SERVICE CMNT-IMP: ABNORMAL
SODIUM BLD-SCNC: 136 MMOL/L (ref 136–145)
SODIUM SERPL-SCNC: 137 MMOL/L (ref 136–145)
SPECIMEN TYPE: ABNORMAL
SPONTANEOUS TIMED, IST: YES
TOTAL RESP. RATE, ITRR: 20
TOTAL RESP. RATE, ITRR: 25
TOTAL RESP. RATE, ITRR: 26
TROPONIN I BLD-MCNC: <0.04 NG/ML (ref 0–0.08)
VENTRICULAR RATE, ECG03: 115 BPM
VENTRICULAR RATE, ECG03: 142 BPM
WBC # BLD AUTO: 18.6 K/UL (ref 4.6–13.2)

## 2019-10-30 PROCEDURE — 5A09357 ASSISTANCE WITH RESPIRATORY VENTILATION, LESS THAN 24 CONSECUTIVE HOURS, CONTINUOUS POSITIVE AIRWAY PRESSURE: ICD-10-PCS | Performed by: HOSPITALIST

## 2019-10-30 PROCEDURE — 74011636637 HC RX REV CODE- 636/637: Performed by: HOSPITALIST

## 2019-10-30 PROCEDURE — 85730 THROMBOPLASTIN TIME PARTIAL: CPT

## 2019-10-30 PROCEDURE — 87040 BLOOD CULTURE FOR BACTERIA: CPT

## 2019-10-30 PROCEDURE — 74011250636 HC RX REV CODE- 250/636: Performed by: EMERGENCY MEDICINE

## 2019-10-30 PROCEDURE — 84484 ASSAY OF TROPONIN QUANT: CPT

## 2019-10-30 PROCEDURE — 77010033678 HC OXYGEN DAILY

## 2019-10-30 PROCEDURE — 83880 ASSAY OF NATRIURETIC PEPTIDE: CPT

## 2019-10-30 PROCEDURE — 74011250636 HC RX REV CODE- 250/636: Performed by: HOSPITALIST

## 2019-10-30 PROCEDURE — 93005 ELECTROCARDIOGRAM TRACING: CPT

## 2019-10-30 PROCEDURE — 85379 FIBRIN DEGRADATION QUANT: CPT

## 2019-10-30 PROCEDURE — 74011000258 HC RX REV CODE- 258: Performed by: EMERGENCY MEDICINE

## 2019-10-30 PROCEDURE — 74011000250 HC RX REV CODE- 250: Performed by: HOSPITALIST

## 2019-10-30 PROCEDURE — 82803 BLOOD GASES ANY COMBINATION: CPT

## 2019-10-30 PROCEDURE — 96376 TX/PRO/DX INJ SAME DRUG ADON: CPT

## 2019-10-30 PROCEDURE — 36600 WITHDRAWAL OF ARTERIAL BLOOD: CPT

## 2019-10-30 PROCEDURE — 74011000250 HC RX REV CODE- 250: Performed by: EMERGENCY MEDICINE

## 2019-10-30 PROCEDURE — 65610000006 HC RM INTENSIVE CARE

## 2019-10-30 PROCEDURE — 94640 AIRWAY INHALATION TREATMENT: CPT

## 2019-10-30 PROCEDURE — 3331090001 HH PPS REVENUE CREDIT

## 2019-10-30 PROCEDURE — 80047 BASIC METABLC PNL IONIZED CA: CPT

## 2019-10-30 PROCEDURE — 96375 TX/PRO/DX INJ NEW DRUG ADDON: CPT

## 2019-10-30 PROCEDURE — 71045 X-RAY EXAM CHEST 1 VIEW: CPT

## 2019-10-30 PROCEDURE — 83605 ASSAY OF LACTIC ACID: CPT

## 2019-10-30 PROCEDURE — 96374 THER/PROPH/DIAG INJ IV PUSH: CPT

## 2019-10-30 PROCEDURE — 82962 GLUCOSE BLOOD TEST: CPT

## 2019-10-30 PROCEDURE — 3331090003 HH PPS REVENUE ADJ

## 2019-10-30 PROCEDURE — 99285 EMERGENCY DEPT VISIT HI MDM: CPT

## 2019-10-30 PROCEDURE — 80053 COMPREHEN METABOLIC PANEL: CPT

## 2019-10-30 PROCEDURE — 85025 COMPLETE CBC W/AUTO DIFF WBC: CPT

## 2019-10-30 PROCEDURE — 3331090002 HH PPS REVENUE DEBIT

## 2019-10-30 PROCEDURE — 77030011256 HC DRSG MEPILEX <16IN NO BORD MOLN -A

## 2019-10-30 PROCEDURE — 94761 N-INVAS EAR/PLS OXIMETRY MLT: CPT

## 2019-10-30 RX ORDER — NALOXONE HYDROCHLORIDE 0.4 MG/ML
0.4 INJECTION, SOLUTION INTRAMUSCULAR; INTRAVENOUS; SUBCUTANEOUS AS NEEDED
Status: DISCONTINUED | OUTPATIENT
Start: 2019-10-30 | End: 2019-11-02 | Stop reason: HOSPADM

## 2019-10-30 RX ORDER — ACETAMINOPHEN 325 MG/1
650 TABLET ORAL
Status: DISCONTINUED | OUTPATIENT
Start: 2019-10-30 | End: 2019-11-02 | Stop reason: HOSPADM

## 2019-10-30 RX ORDER — IPRATROPIUM BROMIDE AND ALBUTEROL SULFATE 2.5; .5 MG/3ML; MG/3ML
3 SOLUTION RESPIRATORY (INHALATION)
Status: DISCONTINUED | OUTPATIENT
Start: 2019-10-30 | End: 2019-11-02 | Stop reason: HOSPADM

## 2019-10-30 RX ORDER — INSULIN LISPRO 100 [IU]/ML
INJECTION, SOLUTION INTRAVENOUS; SUBCUTANEOUS EVERY 6 HOURS
Status: DISCONTINUED | OUTPATIENT
Start: 2019-10-30 | End: 2019-11-01

## 2019-10-30 RX ORDER — IBUPROFEN 200 MG
1 TABLET ORAL EVERY 24 HOURS
Status: DISCONTINUED | OUTPATIENT
Start: 2019-10-30 | End: 2019-11-02 | Stop reason: HOSPADM

## 2019-10-30 RX ORDER — ONDANSETRON 2 MG/ML
4 INJECTION INTRAMUSCULAR; INTRAVENOUS
Status: DISCONTINUED | OUTPATIENT
Start: 2019-10-30 | End: 2019-11-02 | Stop reason: HOSPADM

## 2019-10-30 RX ORDER — IPRATROPIUM BROMIDE AND ALBUTEROL SULFATE 2.5; .5 MG/3ML; MG/3ML
3 SOLUTION RESPIRATORY (INHALATION)
Status: COMPLETED | OUTPATIENT
Start: 2019-10-30 | End: 2019-10-30

## 2019-10-30 RX ORDER — HEPARIN SODIUM 5000 [USP'U]/ML
5000 INJECTION, SOLUTION INTRAVENOUS; SUBCUTANEOUS EVERY 8 HOURS
Status: DISCONTINUED | OUTPATIENT
Start: 2019-10-30 | End: 2019-10-30

## 2019-10-30 RX ORDER — SODIUM CHLORIDE 0.9 % (FLUSH) 0.9 %
5-10 SYRINGE (ML) INJECTION AS NEEDED
Status: DISCONTINUED | OUTPATIENT
Start: 2019-10-30 | End: 2019-11-02 | Stop reason: HOSPADM

## 2019-10-30 RX ORDER — DILTIAZEM HYDROCHLORIDE 5 MG/ML
25 INJECTION INTRAVENOUS
Status: COMPLETED | OUTPATIENT
Start: 2019-10-30 | End: 2019-10-30

## 2019-10-30 RX ORDER — LEVOFLOXACIN 5 MG/ML
500 INJECTION, SOLUTION INTRAVENOUS EVERY 24 HOURS
Status: DISCONTINUED | OUTPATIENT
Start: 2019-10-30 | End: 2019-11-02 | Stop reason: HOSPADM

## 2019-10-30 RX ORDER — FUROSEMIDE 10 MG/ML
40 INJECTION INTRAMUSCULAR; INTRAVENOUS
Status: DISCONTINUED | OUTPATIENT
Start: 2019-10-30 | End: 2019-10-30

## 2019-10-30 RX ORDER — ALBUTEROL SULFATE 0.83 MG/ML
10 SOLUTION RESPIRATORY (INHALATION)
Status: COMPLETED | OUTPATIENT
Start: 2019-10-30 | End: 2019-10-30

## 2019-10-30 RX ORDER — DIPHENHYDRAMINE HYDROCHLORIDE 50 MG/ML
12.5 INJECTION, SOLUTION INTRAMUSCULAR; INTRAVENOUS
Status: DISCONTINUED | OUTPATIENT
Start: 2019-10-30 | End: 2019-11-02 | Stop reason: HOSPADM

## 2019-10-30 RX ORDER — HEPARIN SODIUM 1000 [USP'U]/ML
80 INJECTION, SOLUTION INTRAVENOUS; SUBCUTANEOUS ONCE
Status: COMPLETED | OUTPATIENT
Start: 2019-10-30 | End: 2019-10-30

## 2019-10-30 RX ORDER — BUDESONIDE 0.5 MG/2ML
500 INHALANT ORAL
Status: DISCONTINUED | OUTPATIENT
Start: 2019-10-30 | End: 2019-11-02 | Stop reason: HOSPADM

## 2019-10-30 RX ORDER — NICARDIPINE HYDROCHLORIDE 0.1 MG/ML
5-15 INJECTION INTRAVENOUS
Status: DISCONTINUED | OUTPATIENT
Start: 2019-10-30 | End: 2019-10-30

## 2019-10-30 RX ORDER — HEPARIN SODIUM 10000 [USP'U]/100ML
18-36 INJECTION, SOLUTION INTRAVENOUS
Status: DISCONTINUED | OUTPATIENT
Start: 2019-10-30 | End: 2019-11-02

## 2019-10-30 RX ORDER — ARFORMOTEROL TARTRATE 15 UG/2ML
15 SOLUTION RESPIRATORY (INHALATION)
Status: DISCONTINUED | OUTPATIENT
Start: 2019-10-30 | End: 2019-11-02 | Stop reason: HOSPADM

## 2019-10-30 RX ORDER — DILTIAZEM HYDROCHLORIDE 5 MG/ML
0.25 INJECTION INTRAVENOUS
Status: COMPLETED | OUTPATIENT
Start: 2019-10-30 | End: 2019-10-30

## 2019-10-30 RX ORDER — DIPHENHYDRAMINE HYDROCHLORIDE 50 MG/ML
25 INJECTION, SOLUTION INTRAMUSCULAR; INTRAVENOUS
Status: DISCONTINUED | OUTPATIENT
Start: 2019-10-30 | End: 2019-10-30

## 2019-10-30 RX ORDER — ONDANSETRON 2 MG/ML
4 INJECTION INTRAMUSCULAR; INTRAVENOUS
Status: COMPLETED | OUTPATIENT
Start: 2019-10-30 | End: 2019-10-30

## 2019-10-30 RX ORDER — MAGNESIUM SULFATE 100 %
4 CRYSTALS MISCELLANEOUS AS NEEDED
Status: DISCONTINUED | OUTPATIENT
Start: 2019-10-30 | End: 2019-11-02 | Stop reason: HOSPADM

## 2019-10-30 RX ADMIN — LEVOFLOXACIN 500 MG: 5 INJECTION, SOLUTION INTRAVENOUS at 19:10

## 2019-10-30 RX ADMIN — HEPARIN SODIUM 18 UNITS/KG/HR: 10000 INJECTION, SOLUTION INTRAVENOUS at 19:33

## 2019-10-30 RX ADMIN — DILTIAZEM HYDROCHLORIDE 5 MG/HR: 5 INJECTION INTRAVENOUS at 17:20

## 2019-10-30 RX ADMIN — ARFORMOTEROL TARTRATE 15 MCG: 15 SOLUTION RESPIRATORY (INHALATION) at 19:51

## 2019-10-30 RX ADMIN — ALBUTEROL SULFATE 2.5 MG: 2.5 SOLUTION RESPIRATORY (INHALATION) at 17:44

## 2019-10-30 RX ADMIN — METHYLPREDNISOLONE SODIUM SUCCINATE 60 MG: 125 INJECTION, POWDER, FOR SOLUTION INTRAMUSCULAR; INTRAVENOUS at 19:38

## 2019-10-30 RX ADMIN — BUDESONIDE 500 MCG: 0.5 INHALANT RESPIRATORY (INHALATION) at 19:51

## 2019-10-30 RX ADMIN — HEPARIN SODIUM 7300 UNITS: 1000 INJECTION, SOLUTION INTRAVENOUS; SUBCUTANEOUS at 19:16

## 2019-10-30 RX ADMIN — IPRATROPIUM BROMIDE AND ALBUTEROL SULFATE 3 ML: .5; 3 SOLUTION RESPIRATORY (INHALATION) at 15:17

## 2019-10-30 RX ADMIN — DILTIAZEM HYDROCHLORIDE 23 MG: 5 INJECTION INTRAVENOUS at 15:19

## 2019-10-30 RX ADMIN — ONDANSETRON 4 MG: 2 INJECTION INTRAMUSCULAR; INTRAVENOUS at 15:39

## 2019-10-30 RX ADMIN — DILTIAZEM HYDROCHLORIDE 25 MG: 5 INJECTION INTRAVENOUS at 15:50

## 2019-10-30 RX ADMIN — IPRATROPIUM BROMIDE AND ALBUTEROL SULFATE 3 ML: .5; 3 SOLUTION RESPIRATORY (INHALATION) at 19:51

## 2019-10-30 RX ADMIN — INSULIN LISPRO 2 UNITS: 100 INJECTION, SOLUTION INTRAVENOUS; SUBCUTANEOUS at 19:47

## 2019-10-30 RX ADMIN — HEPARIN SODIUM 5000 UNITS: 5000 INJECTION INTRAVENOUS; SUBCUTANEOUS at 19:36

## 2019-10-30 NOTE — ED PROVIDER NOTES
EMERGENCY DEPARTMENT HISTORY AND PHYSICAL EXAM    Date: 10/30/2019  Patient Name: Ileana Arzola    History of Presenting Illness     Chief Complaint   Patient presents with    Respiratory Distress         History Provided By: EMS    Additional History (Context):   Ileana Arzola is a 66 y.o. 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 History is provided by EMS due to patient's distress. Reportedly complaining of shortness of breath that started this morning.   Was given DuoNeb and Solu-Medrol prior to arrival.    PCP: Isabella Jacob MD    Current Facility-Administered Medications   Medication Dose Route Frequency Provider Last Rate Last Dose    sodium chloride (NS) flush 5-10 mL  5-10 mL IntraVENous PRN Pavithra Andrade DO        nitroglycerin (NITROBID) 2 % ointment 1 Inch  1 Inch Topical NOW Pavithra Andrade DO   Stopped at 10/30/19 1457    albuterol (PROVENTIL VENTOLIN) nebulizer solution 10 mg  10 mg Nebulization NOW Pavithra Andrade DO        dilTIAZem (CARDIZEM) 125 mg in 0.9% sodium chloride 125 mL infusion  0-15 mg/hr IntraVENous TITRATE Pavithra Andrade DO 5 mL/hr at 10/30/19 1720 5 mg/hr at 10/30/19 1720    acetaminophen (TYLENOL) tablet 650 mg  650 mg Oral Q4H PRN Rob Ga MD        naloxone [facility]) injection 0.4 mg  0.4 mg IntraVENous PRN Rob Ga MD        diphenhydrAMINE (BENADRYL) injection 12.5 mg  12.5 mg IntraVENous Q4H PRN Rob Ga MD        ondansetron [facility]) injection 4 mg  4 mg IntraVENous Q4H PRN Rob Ga MD        nicotine (NICODERM CQ) 14 mg/24 hr patch 1 Patch  1 Patch TransDERmal Q24H Rob Ga MD        heparin (porcine) injection 5,000 Units  5,000 Units SubCUTAneous Q8H Rob Ga MD        methylPREDNISolone (PF) (Solu-MEDROL) injection 60 mg  60 mg IntraVENous Q6H Rob Ga MD        heparin (porcine) 1,000 unit/mL injection 7,300 Units  80 Units/kg IntraVENous ONCE Polly Andrade DO        heparin 25,000 units in D5W 250 ml infusion  18-36 Units/kg/hr IntraVENous TITRATE Polly Andrade DO         Current Outpatient Medications   Medication Sig Dispense Refill    acetaminophen (TYLENOL) 325 mg tablet Take 500 mg by mouth every four (4) hours as needed for Pain.  predniSONE (DELTASONE) 10 mg tablet Prednisone 10mg tabs: p.o.  4 tabs daily for 2 days then drop to   3 tabs daily for 2 days then drop to   2 tabs daily for 2 days then drop to   1 tab daily for 2 days then stop. Dispense 20 tabs 20 Tab 0    OXYGEN-AIR DELIVERY SYSTEMS Take 2 L by inhalation continuous.  fluticasone propionate (FLONASE) 50 mcg/actuation nasal spray 2 Sprays by Both Nostrils route daily.  dextran 70/hypromellose (ARTIFICIAL TEARS, PF, OP) Apply  to eye as needed.  diphenhydrAMINE (BENADRYL) 25 mg capsule Take 25 mg by mouth nightly as needed for Itching.  albuterol-ipratropium (DUO-NEB) 2.5 mg-0.5 mg/3 ml nebu 3 mL by Nebulization route every four (4) hours as needed. (Patient taking differently: 3 mL by Nebulization route every four (4) hours as needed for Other (Shortness of breath). ) 30 Nebule 0    albuterol (PROVENTIL HFA, VENTOLIN HFA, PROAIR HFA) 90 mcg/actuation inhaler Take 2 Puffs by inhalation every four (4) hours as needed for Wheezing or Shortness of Breath. 1 Inhaler 1    ipratropium (ATROVENT) 0.03 % nasal spray 2 Sprays by Both Nostrils route every twelve (12) hours as needed.  tamsulosin (FLOMAX) 0.4 mg capsule Take 0.4 mg by mouth every evening.  chlorthalidone (HYGROTEN) 25 mg tablet Take  by mouth daily.          Past History     Past Medical History:  Past Medical History:   Diagnosis Date    Cancer [facility])     prostate    COPD [facility]chronic obstructive pulmonary disease) ([facility] [address]     Hypertension     Nocturia     Pneumonia     Radiation effect        Past Surgical History:  Past Surgical History:   Procedure Laterality Date    HX HERNIA REPAIR         Family History:  Family History   Problem Relation Age of Onset    Heart Disease Mother        Social History:  Social History     Tobacco Use    Smoking status: Former Smoker     Types: Cigarettes    Smokeless tobacco: Never Used   Substance Use Topics    Alcohol use: No    Drug use: Not on file       Allergies: Allergies   Allergen Reactions    Aspirin Other (comments)     \"messes my stomach up\"         Review of Systems   Review of Systems   Unable to perform ROS: Severe respiratory distress       Physical Exam     Vitals:    10/30/19 1545 10/30/19 1550 10/30/19 1630 10/30/19 1645   BP: (!) 159/98 (!) 159/98 157/76 (!) 173/93   Pulse: (!) 120 (!) 130 (!) 112 (!) 150   Resp: 24  27 27   Temp:       SpO2:   98% 95%   Weight:       Height:         Physical Exam    Nursing note and vitals reviewed    Constitutional: Obese elderly  male distress  Head: Normocephalic, Atraumatic  Eyes: Pupils are equal, round, and reactive to light, EOMI  Neck: Supple, non-tender  Cardiovascular: Irregularly irregular, tachycardic, rubs, or gallops  Chest: Tachypneic with symmetrical chest excursion bilaterally  Lungs: Tachypneic, very diminished breath sounds with faint wheezes, using suprasternal accessory muscles of respiration  Abdomen: Soft, non tender, non distended, normoactive bowel sounds  Back: No evidence of trauma or deformity  Extremities: No evidence of trauma or deformity, no LE edema.  No streaking erythema, vesicular lesions, ulcerations or bulla  Skin: Warm and dry, normal cap refill  Neuro: Alert and appropriate, CN intact, normal speech, moving all 4 extremities freely and symmetrically      Diagnostic Study Results     Labs -     Recent Results (from the past 12 hour(s))   METABOLIC PANEL, COMPREHENSIVE    Collection Time: 10/30/19  3:05 PM   Result Value Ref Range    Sodium 137 136 - 145 mmol/L Potassium 4.9 3.5 - 5.5 mmol/L    Chloride 103 100 - 111 mmol/L    CO2 30 21 - 32 mmol/L    Anion gap 4 3.0 - 18 mmol/L    Glucose 154 (H) 74 - 99 mg/dL    BUN 30 (H) 7.0 - 18 MG/DL    Creatinine 1.24 0.6 - 1.3 MG/DL    BUN/Creatinine ratio 24 (H) 12 - 20      GFR est AA >60 >60 ml/min/1.73m2    GFR est non-AA 57 (L) >60 ml/min/1.73m2    Calcium 8.1 (L) 8.5 - 10.1 MG/DL    Bilirubin, total 0.4 0.2 - 1.0 MG/DL    ALT (SGPT) 19 16 - 61 U/L    AST (SGOT) 10 10 - 38 U/L    Alk. phosphatase 80 45 - 117 U/L    Protein, total 6.4 6.4 - 8.2 g/dL    Albumin 3.7 3.4 - 5.0 g/dL    Globulin 2.7 2.0 - 4.0 g/dL    A-G Ratio 1.4 0.8 - 1.7     CBC WITH AUTOMATED DIFF    Collection Time: 10/30/19  3:05 PM   Result Value Ref Range    WBC 18.6 (H) 4.6 - 13.2 K/uL    RBC 4.06 (L) 4.70 - 5.50 M/uL    HGB 12.0 (L) 13.0 - 16.0 g/dL    HCT 37.7 36.0 - 48.0 %    MCV 92.9 74.0 - 97.0 FL    MCH 29.6 24.0 - 34.0 PG    MCHC 31.8 31.0 - 37.0 g/dL    RDW 14.5 11.6 - 14.5 %    PLATELET 044 656 - 694 K/uL    MPV 8.8 (L) 9.2 - 11.8 FL    NEUTROPHILS 55 42 - 75 %    BAND NEUTROPHILS 1 0 - 5 %    LYMPHOCYTES 31 20 - 51 %    MONOCYTES 10 (H) 2 - 9 %    EOSINOPHILS 3 0 - 5 %    BASOPHILS 0 0 - 3 %    ABS. NEUTROPHILS 10.2 (H) 1.8 - 8.0 K/UL    ABS. LYMPHOCYTES 5.8 (H) 0.8 - 3.5 K/UL    ABS. MONOCYTES 1.9 (H) 0 - 1.0 K/UL    ABS. EOSINOPHILS 0.6 (H) 0.0 - 0.4 K/UL    ABS.  BASOPHILS 0.0 0.0 - 0.1 K/UL    RBC COMMENTS NORMOCYTIC, NORMOCHROMIC      DF MANUAL     NT-PRO BNP    Collection Time: 10/30/19  3:05 PM   Result Value Ref Range    NT pro- 0 - 1,800 PG/ML   POC LACTIC ACID    Collection Time: 10/30/19  3:08 PM   Result Value Ref Range    Lactic Acid (POC) 0.72 0.40 - 2.00 mmol/L   EKG, 12 LEAD, INITIAL    Collection Time: 10/30/19  3:14 PM   Result Value Ref Range    Ventricular Rate 142 BPM    Atrial Rate 357 BPM    QRS Duration 82 ms    Q-T Interval 294 ms    QTC Calculation (Bezet) 452 ms    Calculated R Axis 62 degrees    Calculated T Axis 89 degrees    Diagnosis       Atrial fibrillation with rapid ventricular response with premature   ventricular or aberrantly conducted complexes  Abnormal ECG  When compared with ECG of 19-OCT-2019 13:51,  Atrial fibrillation has replaced Sinus rhythm     POC G3    Collection Time: 10/30/19  3:23 PM   Result Value Ref Range    Device: BIPAP      FIO2 (POC) 100 %    pH (POC) 7.188 (LL) 7.35 - 7.45      pCO2 (POC) 79.2 (H) 35.0 - 45.0 MMHG    pO2 (POC) 512 (H) 80 - 100 MMHG    HCO3 (POC) 30.1 (H) 22 - 26 MMOL/L    sO2 (POC) 100 (H) 92 - 97 %    Base excess (POC) 2 mmol/L    PEEP/CPAP (POC) 8 cmH2O    PIP (POC) 16      Allens test (POC) YES      Total resp.  rate 26      Site RIGHT RADIAL      Specimen type (POC) ARTERIAL      Performed by Gautam Tamayo     Spontaneous timed YES     POC CHEM8    Collection Time: 10/30/19  3:27 PM   Result Value Ref Range    CO2, POC 29 (H) 19 - 24 MMOL/L    Glucose,  (H) 74 - 106 MG/DL    BUN, POC 30 (H) 7 - 18 MG/DL    Creatinine, POC 1.3 0.6 - 1.3 MG/DL    GFRAA, POC >60 >60 ml/min/1.73m2    GFRNA, POC 54 (L) >60 ml/min/1.73m2    Sodium,  136 - 145 MMOL/L    Potassium, POC 4.7 3.5 - 5.5 MMOL/L    Calcium, ionized (POC) 1.15 1.12 - 1.32 mmol/L    Chloride,  100 - 108 MMOL/L    Anion gap, POC 12 10 - 20      Hematocrit, POC 46 36 - 49 %    Hemoglobin, POC 15.6 12 - 16 G/DL   POC TROPONIN-I    Collection Time: 10/30/19  3:30 PM   Result Value Ref Range    Troponin-I (POC) <0.04 0.00 - 0.08 ng/mL   EKG, 12 LEAD, SUBSEQUENT    Collection Time: 10/30/19  3:42 PM   Result Value Ref Range    Ventricular Rate 115 BPM    Atrial Rate 110 BPM    QRS Duration 74 ms    Q-T Interval 276 ms    QTC Calculation (Bezet) 381 ms    Calculated R Axis 62 degrees    Calculated T Axis 72 degrees    Diagnosis       Atrial fibrillation with rapid ventricular response with premature   ventricular or aberrantly conducted complexes  Abnormal ECG  When compared with ECG of 30-OCT-2019 15:14,  No significant change was found     POC G3    Collection Time: 10/30/19  4:27 PM   Result Value Ref Range    Device: BIPAP      FIO2 (POC) 50 %    pH (POC) 7.242 (LL) 7.35 - 7.45      pCO2 (POC) 63.9 (H) 35.0 - 45.0 MMHG    pO2 (POC) 105 (H) 80 - 100 MMHG    HCO3 (POC) 27.5 (H) 22 - 26 MMOL/L    sO2 (POC) 97 92 - 97 %    Base excess (POC) 0 mmol/L    PEEP/CPAP (POC) 7 cmH2O    PIP (POC) 12      Allens test (POC) YES      Total resp. rate 25      Site RIGHT RADIAL      Specimen type (POC) ARTERIAL      Performed by VELVET Grady YES         Radiologic Studies -   XR CHEST PORT   Final Result   IMPRESSION:         1. No acute radiographic cardiopulmonary abnormality. Unchanged basilar areas of   scarring or atelectasis. CT Results  (Last 48 hours)    None        CXR Results  (Last 48 hours)               10/30/19 1513  XR CHEST PORT Final result    Impression:  IMPRESSION:           1. No acute radiographic cardiopulmonary abnormality. Unchanged basilar areas of   scarring or atelectasis. Narrative:  EXAM: XR CHEST PORT       CLINICAL INDICATION/HISTORY: Respiratory distress   -Additional: None       COMPARISON: Several prior exams, most recently chest radiograph dated 10/20/2019       TECHNIQUE: Frontal view of the chest       _______________       FINDINGS:       HEART AND MEDIASTINUM: Normal cardiac size and mediastinal contours. LUNGS AND PLEURAL SPACES: Elevated right hemidiaphragm and volume loss in the   right hemithorax unchanged from prior examination. No focal pneumonic opacity. No pneumothorax or pleural effusion. Partially peripherally calcified   pleural-based lesion projecting over the right chest wall unchanged from   multiple prior examinations. The linear opacities at each lung base are similar   in appearance to prior. Improved aeration of the right lung base from recent   radiographs.        BONY THORAX AND SOFT TISSUES: No acute osseous abnormality _______________                   Medical Decision Making   I am the first provider for this patient. I reviewed the vital signs, available nursing notes, past medical history, past surgical history, family history and social history. Vital Signs-Reviewed the patient's vital signs. Pulse Oximetry Analysis -95 % on CPAP    Cardiac Monitor:  Rate: 137 bpm  Rhythm: Irregular    EKG interpretation: (Preliminary)  3:27 PM  A. fib with RVR at 142 bpm.  No acute ST elevation    EKG interpretation: (Preliminary)  3:45 PM   A. fib with RVR at 115 bpm.  No acute ST elevation    Records Reviewed: Nursing Notes and Old Medical Records    Provider Notes:   66 y.o. male with a history of oxygen dependent COPD presenting in severe respiratory distress. On presentation, noted to be in new onset A. fib with RVR. Hypertensive. Tachypneic using suprasternal muscles of retraction. Diminished breath sounds with faint wheezes. Concern for severe COPD exacerbation. However due to elevated blood pressure, concern for acute pulmonary edema. Patient emergently placed on BiPAP. Will order ABG, give duo nebs. Patient given a dose of Cardizem with improvement in his heart rate. Continues to be in A. fib. Will eval for ACS. Patient much more comfortable on BiPAP. Discussed with patient the possibility of endotracheal intubation. Patient states that he would like to be full code. Procedures:  Procedures    ED Course:   2:56 PM   Initial assessment performed. The patients presenting problems have been discussed, and they are in agreement with the care plan formulated and outlined with them. I have encouraged them to ask questions as they arise throughout their visit.    5:21 PM Discussed patient's history, exam, and available diagnostics results with Dr. Magnolia Vallejo, pulmonary, who meant starting on heparin drip and PE work-up tomorrow and the patient is stable.     5:26 PM Discussed patient's history, exam, and available diagnostics results with Jayden Wilkins MD, cardiology, who agrees with starting Cardizem drip for A. fib with RVR             Diagnosis and Disposition       5:22 PM  I have spent 120 minutes of critical care time involved in lab review, consultations with specialist, family decision-making, and documentation. During this entire length of time I was immediately available to the patient. Critical Care: The reason for providing this level of medical care for this critically ill patient was due a critical illness that impaired one or more vital organ systems such that there was a high probability of imminent or life threatening deterioration in the patients condition. This care involved high complexity decision making to assess, manipulate, and support vital system functions, to treat this degreee vital organ system failure and to prevent further life threatening deterioration of the patients condition. Core Measures:  For Hospitalized Patients:    1. Hospitalization Decision Time:  The decision to hospitalize the patient was made by Liya Graham DO at 5:23 PM on 10/30/2019    2. Aspirin: Aspirin was not given because the patient did not present with a stroke at the time of their Emergency Department evaluation    3:34 PM  Patient is being admitted to the hospital by Anahi High MD. The results of their tests and reasons for their admission have been discussed with them and/or available family. They convey agreement and understanding for the need to be admitted and for their admission diagnosis. CONDITIONS ON ADMISSION:  Sepsis is not present at the time of admission. Pneumonia is not present at the time of admission. MRSA is not present at the time of admission. Wound infection is not present at the time of admission. Pressure Ulcer is not present at the time of admission. CLINICAL IMPRESSION:    1. Acute on chronic respiratory failure with hypoxia (HCC)    2.  [facility] ([facility]    3. Acute exacerbation of chronic obstructive pulmonary disease (COPD) ([facility]      ____________________________________     Please note that this dictation was completed with [employer], the computer voice recognition software. Quite often unanticipated grammatical, syntax, homophones, and other interpretive errors are inadvertently transcribed by the computer software. Please disregard these errors. Please excuse any errors that have escaped final proofreading.

## 2019-10-30 NOTE — ED TRIAGE NOTES
Pt arrives per EMS w/ respiratory distress with sudden onset 25 minutes ago. EMS reports pt called for help from other room with epistaxis and no nasal cannula placement, saturations were unable to be obtained per EMS arrival. Pt placed on CPAP and up to 96%.

## 2019-10-30 NOTE — PROGRESS NOTES
1830: Pt arrived to ICU. Visit Vitals  /84   Pulse (!) 127   Temp 96.1 °F (35.6 °C)   Resp 19   Ht 6' (1.829 m)   Wt 91.2 kg (201 lb 1.6 oz)   SpO2 100%   BMI 27.27 kg/m²       Dr. Delma Lei at the bedside 4331 for cardiac consult. Temp low- warmer blanket applied. 1900: Assessment complete. See flow sheets. Two daughters at the bedside. 1915: aPTT results back 22.8. Heparin bolus given and gtt started- see MAR. New IV initiated, left AC removed by pt. on accident. Order to recheck ptt at 0130 placed per protocol. 1948: Recheck temp- 97.4 (axillary)    2030: pt resting in bed. Bipap remains in place. NSR on monitor. NAD.     2251: Pt requesting BIPAP to be removed. Complaints of dry mouth and face hurting from the BIPAP mask. Called RT to verify ok. NC applied at 3 liters. Mouth care performed. Bilateral hearing aides removed and placed in container on bedside table. 2335: Bedside and Verbal shift change report given to Frank Novak RN (oncoming nurse) by Jessica Darden RN   (offgoing nurse). Report included the following information SBAR, Intake/Output, MAR, Recent Results and Cardiac Rhythm NSR. Verified IV heparin and cardizem gtt. Pt tolerating NC at this time. No SOB. RN aware next aPTT check at 0130 for heparin protocol.

## 2019-10-30 NOTE — ROUTINE PROCESS
TRANSFER - OUT REPORT:    Verbal report given to CAITLIN Dominguez(name) on Ronit Segura  being transferred to ICU(unit) for routine progression of care       Report consisted of patients Situation, Background, Assessment and   Recommendations(SBAR). Information from the following report(s) SBAR, ED Summary, MAR, Recent Results and Cardiac Rhythm afib was reviewed with the receiving nurse. Lines:   Peripheral IV 10/30/19 Right Antecubital (Active)   Site Assessment Clean, dry, & intact 10/30/2019  3:05 PM   Phlebitis Assessment 0 10/30/2019  3:05 PM   Infiltration Assessment 0 10/30/2019  3:05 PM   Dressing Status Clean, dry, & intact 10/30/2019  3:05 PM   Dressing Type Tape;Transparent 10/30/2019  3:05 PM   Hub Color/Line Status Green;Flushed;Patent 10/30/2019  3:05 PM   Action Taken Blood drawn 10/30/2019  3:05 PM       Peripheral IV 10/30/19 Left Antecubital (Active)   Site Assessment Clean, dry, & intact 10/30/2019  2:50 PM   Phlebitis Assessment 0 10/30/2019  2:50 PM   Infiltration Assessment 0 10/30/2019  2:50 PM   Dressing Status Clean, dry, & intact 10/30/2019  2:50 PM   Dressing Type Tape;Transparent 10/30/2019  2:50 PM   Hub Color/Line Status Green;Flushed;Patent 10/30/2019  2:50 PM        Opportunity for questions and clarification was provided.       Patient transported with:   Monitor  O2 @ BiPAP liters  Registered Nurse

## 2019-10-30 NOTE — H&P
History & Physical    Patient: Zachery Leventhal MRN: [id number]  CSN: [id number]    YOB: 1945  Age: 66 y.o. Sex: male      DOA: 10/30/2019  Primary Care Provider:  Leticia Moore MD      Assessment/Plan     Hospital Problems  Date Reviewed: 7/6/2019          Codes Class Noted POA    * (Principal) Acute on chronic respiratory failure with hypoxia and hypercapnia (HCC) ICD-10-CM: J96.21, J96.22  ICD-9-CM: 518.84, 786.09, 799.02  10/30/2019         Atrial fibrillation with RVR (HCC) ICD-10-CM: I48.91  ICD-9-CM: 427.31  10/30/2019 Unknown        Acute exacerbation of chronic obstructive pulmonary disease (COPD) ([facility] ICD-10-CM: J44.1  ICD-9-CM: 491.21  2/8/2019 Yes        Asbestosis ([facility] ICD-10-CM: T15  ICD-9-CM: 411  10/19/2018 Yes        Hypertension ICD-10-CM: I10  ICD-9-CM: 401.9  Unknown Yes                Admit to ICU    CRITICAL CARE PLAN     Resp   Acute on chronic respiratory failure with hypercapnia and hypoxia  Continue BiPAP. He is has a low threshold to be intubated  Repeat ABG gotten better with improving  Dr. Aziza Chauhan was informed this patient  Need to rule out PE, will have  CTA chest while he is able to lying flat  On heparin drip right now. Acute COPD exacerbation  Continue IV steroid. Solu-Medrol breathing treatment, start Levaquin   HOB>30 degrees.      Asbestosis     [facility]   A. fib with RVR  Heparin drip, Cardizem drip titrated for heart rate control  Recent echo done with normal EF  Case discussed with Dr. Saul Cabrera. Hypertension: On Cardizem drip, continue monitor     ID   On Levaquin IV for empiric treatment   Trend temps, wbc. Heme/onc   Leukocytosis:   chest x-ray no acute issue  Follow H&H, plts. INR.     Renal   Trend BUN, Cr, follow I/O, dewitt in place. Check and replace Mg, K, phos. icu electrolytes replacement protocol     Neuro: Alert orientated     Endocrine - Follow FSG,  sliding scale for glucose control        GI - NPO for now.   PPI     Prophylaxis - DVT: SCDs, GI: protonix    70minutes of critical care time spent in the direct evaluation and treatment of this high risk patient. The reason for providing this level of medical care for this critically ill patient was due a critical illness that impaired one or more vital organ systems such that there was a high probability of imminent or life threatening deterioration in the patients condition. This care involved high complexity decision making to assess, manipulate, and support vital system functions, to treat this degreee vital organ system failure and to prevent further life threatening deterioration of the patients condition. AC:  I have had a discussion with patient and family regarding code status. Particularly, described potential options in event of cardiac or respiratory arrest. I have explained what being full code entails, including cardiorespiratory resuscitation attempts with chest compressions, potential cariodioversion/ \" shocks\" as well as intubation. I have also explained Do not resuscitate which would mean we allow a natural death with out aggressive interventions. Patient want to be full code, he has DNR/DNI I on chart but he wants to be full code now [facility] 32 min       Estimate  length of stay : 2-3 day    DVT : heparin  ppi proph  CC: I cannot breath       HPI:     Adis Monday is a 66 y.o. male who with past medical history of COPD, on home oxygen 2 L, asbestosis, PAF was sent to ER per family due to shortness of breath for 1 day. Per family reported, he was seen to the bathroom and asking for help due to cannot breathe. First ABG pH was 7.188, CO2 79. BiPAP was put on. IV steroid and breathing treatment were performed in ER. Repeated ABG mild improving. A. fib with RVR was notified on EKG. 30 mg Cardizem drip was given in ER, heart rate got some responded but still elevated. Cardizem drip was started in the ER, have to titrated from 5 to 10-15. He was seen in ER. wife and grandchildren were at bedside. Wife said his  has been suffering for breathing issue, he is afraid of going out due to shortness of breath. No chest pain reported. Admission and treatment discussed with patient and family, all agree and indicated a verbal understanding   Visit Vitals  BP (!) 173/93   Pulse (!) 150   Temp 98 °F (36.7 °C)   Resp 27   Ht 6' (1.829 m)   Wt 91.2 kg (201 lb 1.6 oz)   SpO2 95%   BMI 27.27 kg/m²      O2 Device: BIPAP      Past Medical History:   Diagnosis Date    Cancer ([address]     prostate    COPD (chronic obstructive pulmonary disease) ([address]     Hypertension     Nocturia     Pneumonia     Radiation effect        Past Surgical History:   Procedure Laterality Date    HX HERNIA REPAIR         Family History   Problem Relation Age of Onset    Heart Disease Mother        Social History     Socioeconomic History    Marital status:      Spouse name: Not on file    Number of children: Not on file    Years of education: Not on file    Highest education level: Not on file   Tobacco Use    Smoking status: Former Smoker     Types: Cigarettes    Smokeless tobacco: Never Used   Substance and Sexual Activity    Alcohol use: No       Prior to Admission medications    Medication Sig Start Date End Date Taking? Authorizing Provider   acetaminophen (TYLENOL) 325 mg tablet Take 500 mg by mouth every four (4) hours as needed for Pain. Provider, Historical   predniSONE (DELTASONE) 10 mg tablet Prednisone 10mg tabs: p.o.  4 tabs daily for 2 days then drop to   3 tabs daily for 2 days then drop to   2 tabs daily for 2 days then drop to   1 tab daily for 2 days then stop. Dispense 20 tabs 10/22/19   Jenifer Daniels MD   OXYGEN-AIR DELIVERY SYSTEMS Take 2 L by inhalation continuous. Provider, Historical   fluticasone propionate (FLONASE) 50 mcg/actuation nasal spray 2 Sprays by Both Nostrils route daily.     Provider, Historical   dextran 70/hypromellose (ARTIFICIAL TEARS, PF, OP) Apply  to eye as needed. Provider, Historical   diphenhydrAMINE (BENADRYL) 25 mg capsule Take 25 mg by mouth nightly as needed for Itching. Provider, Historical   albuterol-ipratropium (DUO-NEB) 2.5 mg-0.5 mg/3 ml nebu 3 mL by Nebulization route every four (4) hours as needed. Patient taking differently: 3 mL by Nebulization route every four (4) hours as needed for Other (Shortness of breath). 19   Meet Patella, DO   albuterol (PROVENTIL HFA, VENTOLIN HFA, PROAIR HFA) 90 mcg/actuation inhaler Take 2 Puffs by inhalation every four (4) hours as needed for Wheezing or Shortness of Breath. 18   Shakir Nair PA-C   ipratropium (ATROVENT) 0.03 % nasal spray 2 Sprays by Both Nostrils route every twelve (12) hours as needed. Other, MD Blayne   tamsulosin (FLOMAX) 0.4 mg capsule Take 0.4 mg by mouth every evening. Other, MD Blayne   chlorthalidone (HYGROTEN) 25 mg tablet Take  by mouth daily. Other, MD Blayne       Allergies   Allergen Reactions    Aspirin Other (comments)     \"messes my stomach up\"       Review of Systems  Unable to obtain due to respiratory distress        Physical Exam:     Physical Exam:  Visit Vitals  BP (!) 173/93   Pulse (!) 150   Temp 98 °F (36.7 °C)   Resp 27   Ht 6' (1.829 m)   Wt 91.2 kg (201 lb 1.6 oz)   SpO2 95%   BMI 27.27 kg/m²      O2 Device: BIPAP    Temp (24hrs), Av °F (36.7 °C), Min:98 °F (36.7 °C), Max:98 °F (36.7 °C)    No intake/output data recorded. No intake/output data recorded. General:  Awake, cooperative, in distress. Head:  Normocephalic, without obvious abnormality, atraumatic. Eyes:  Conjunctivae/corneas clear, sclera anicteric, PERRL, EOMs intact. Nose: Nares normal. No drainage or sinus tenderness. BiPAP mask   Throat: Lips, mucosa, and tongue normal. .   Neck: Supple, symmetrical, trachea midline, no adenopathy.        Lungs:    Increasing  expiration breathing sound, wheezing, right greater than left       Heart:   irregularly irregular tachycardia, S1, S2 normal, no murmur, click, rub or gallop. Abdomen: Soft, non-tender. Bowel sounds normal. No masses,  No organomegaly. Extremities: Extremities normal, atraumatic, no cyanosis or edema. Pulses: 2+ and symmetric all extremities. Skin: Skin color-pink, texture, turgor normal. No rashes or lesions. Capillary refill normal    Neurologic: CNII-XII intact. No focal motor or sensory deficit.        Labs Reviewed:    BMP:   Lab Results   Component Value Date/Time     10/30/2019 03:05 PM    K 4.9 10/30/2019 03:05 PM     10/30/2019 03:05 PM    CO2 30 10/30/2019 03:05 PM    AGAP 4 10/30/2019 03:05 PM     (H) 10/30/2019 03:05 PM    BUN 30 (H) 10/30/2019 03:05 PM    CREA 1.24 10/30/2019 03:05 PM    GFRAA >60 10/30/2019 03:05 PM    GFRNA 57 (L) 10/30/2019 03:05 PM     CMP:   Lab Results   Component Value Date/Time     10/30/2019 03:05 PM    K 4.9 10/30/2019 03:05 PM     10/30/2019 03:05 PM    CO2 30 10/30/2019 03:05 PM    AGAP 4 10/30/2019 03:05 PM     (H) 10/30/2019 03:05 PM    BUN 30 (H) 10/30/2019 03:05 PM    CREA 1.24 10/30/2019 03:05 PM    GFRAA >60 10/30/2019 03:05 PM    GFRNA 57 (L) 10/30/2019 03:05 PM    CA 8.1 (L) 10/30/2019 03:05 PM    ALB 3.7 10/30/2019 03:05 PM    TP 6.4 10/30/2019 03:05 PM    GLOB 2.7 10/30/2019 03:05 PM    AGRAT 1.4 10/30/2019 03:05 PM    SGOT 10 10/30/2019 03:05 PM    ALT 19 10/30/2019 03:05 PM     CBC:   Lab Results   Component Value Date/Time    WBC 18.6 (H) 10/30/2019 03:05 PM    HGB 12.0 (L) 10/30/2019 03:05 PM    HCT 37.7 10/30/2019 03:05 PM     10/30/2019 03:05 PM     All Cardiac Markers in the last 24 hours:   Lab Results   Component Value Date/Time    TNIPOC <0.04 10/30/2019 03:30 PM     Recent Glucose Results:   Lab Results   Component Value Date/Time     (H) 10/30/2019 03:05 PM     ABG:   Lab Results   Component Value Date/Time    PHI 7.242 (LL) 10/30/2019 04:27 PM    PCO2I 63.9 (H) 10/30/2019 04:27 PM    PO2I 105 (H) 10/30/2019 04:27 PM    HCO3I 27.5 (H) 10/30/2019 04:27 PM    FIO2I 50 10/30/2019 04:27 PM     COAGS:   Lab Results   Component Value Date/Time    APTT 22.8 (L) 10/30/2019 03:05 PM     Liver Panel:   Lab Results   Component Value Date/Time    ALB 3.7 10/30/2019 03:05 PM    TP 6.4 10/30/2019 03:05 PM    GLOB 2.7 10/30/2019 03:05 PM    AGRAT 1.4 10/30/2019 03:05 PM    SGOT 10 10/30/2019 03:05 PM    ALT 19 10/30/2019 03:05 PM    AP 80 10/30/2019 03:05 PM     Pancreatic Markers: No results found for: AMYLPOCT, AML, LIPPOCT, LPSE    Procedures/imaging: see electronic medical records for all procedures/Xrays and details which were not copied into this note but were reviewed prior to creation of Erika Sim MD, Internal Medicine     CC: Leticia Moore MD

## 2019-10-31 ENCOUNTER — APPOINTMENT (OUTPATIENT)
Dept: CT IMAGING | Age: 74
DRG: 189 | End: 2019-10-31
Attending: HOSPITALIST
Payer: MEDICARE

## 2019-10-31 LAB
AMPHET UR QL SCN: NEGATIVE
ANION GAP SERPL CALC-SCNC: 11 MMOL/L (ref 3–18)
APPEARANCE UR: CLEAR
APTT PPP: 106.4 SEC (ref 23–36.4)
APTT PPP: 135.1 SEC (ref 23–36.4)
APTT PPP: 174.9 SEC (ref 23–36.4)
APTT PPP: 98.7 SEC (ref 23–36.4)
BARBITURATES UR QL SCN: NEGATIVE
BASOPHILS # BLD: 0 K/UL (ref 0–0.1)
BASOPHILS NFR BLD: 0 % (ref 0–3)
BENZODIAZ UR QL: NEGATIVE
BILIRUB UR QL: NEGATIVE
BUN SERPL-MCNC: 38 MG/DL (ref 7–18)
BUN/CREAT SERPL: 29 (ref 12–20)
CALCIUM SERPL-MCNC: 8.3 MG/DL (ref 8.5–10.1)
CANNABINOIDS UR QL SCN: NEGATIVE
CHLORIDE SERPL-SCNC: 102 MMOL/L (ref 100–111)
CO2 SERPL-SCNC: 24 MMOL/L (ref 21–32)
COCAINE UR QL SCN: NEGATIVE
COLOR UR: YELLOW
CREAT SERPL-MCNC: 1.33 MG/DL (ref 0.6–1.3)
DIFFERENTIAL METHOD BLD: ABNORMAL
EOSINOPHIL # BLD: 0 K/UL (ref 0–0.4)
EOSINOPHIL NFR BLD: 0 % (ref 0–5)
ERYTHROCYTE [DISTWIDTH] IN BLOOD BY AUTOMATED COUNT: 14.4 % (ref 11.6–14.5)
GLUCOSE BLD STRIP.AUTO-MCNC: 143 MG/DL (ref 70–110)
GLUCOSE BLD STRIP.AUTO-MCNC: 146 MG/DL (ref 70–110)
GLUCOSE BLD STRIP.AUTO-MCNC: 159 MG/DL (ref 70–110)
GLUCOSE BLD STRIP.AUTO-MCNC: 161 MG/DL (ref 70–110)
GLUCOSE SERPL-MCNC: 153 MG/DL (ref 74–99)
GLUCOSE UR STRIP.AUTO-MCNC: NEGATIVE MG/DL
HCT VFR BLD AUTO: 33.8 % (ref 36–48)
HDSCOM,HDSCOM: NORMAL
HGB BLD-MCNC: 11 G/DL (ref 13–16)
HGB UR QL STRIP: NEGATIVE
KETONES UR QL STRIP.AUTO: NEGATIVE MG/DL
LEUKOCYTE ESTERASE UR QL STRIP.AUTO: NEGATIVE
LYMPHOCYTES # BLD: 0.5 K/UL (ref 0.8–3.5)
LYMPHOCYTES NFR BLD: 3 % (ref 20–51)
MAGNESIUM SERPL-MCNC: 2.1 MG/DL (ref 1.6–2.6)
MCH RBC QN AUTO: 29.6 PG (ref 24–34)
MCHC RBC AUTO-ENTMCNC: 32.5 G/DL (ref 31–37)
MCV RBC AUTO: 90.9 FL (ref 74–97)
METAMYELOCYTES NFR BLD MANUAL: 1 %
METHADONE UR QL: NEGATIVE
MONOCYTES # BLD: 0.2 K/UL (ref 0–1)
MONOCYTES NFR BLD: 1 % (ref 2–9)
NEUTS BAND NFR BLD MANUAL: 3 % (ref 0–5)
NEUTS SEG # BLD: 15.4 K/UL (ref 1.8–8)
NEUTS SEG NFR BLD: 92 % (ref 42–75)
NITRITE UR QL STRIP.AUTO: NEGATIVE
OPIATES UR QL: NEGATIVE
PCP UR QL: NEGATIVE
PH UR STRIP: 5.5 [PH] (ref 5–8)
PLATELET # BLD AUTO: 249 K/UL (ref 135–420)
PLATELET COMMENTS,PCOM: ABNORMAL
PMV BLD AUTO: 8.7 FL (ref 9.2–11.8)
POTASSIUM SERPL-SCNC: 5.2 MMOL/L (ref 3.5–5.5)
PROT UR STRIP-MCNC: NEGATIVE MG/DL
RBC # BLD AUTO: 3.72 M/UL (ref 4.7–5.5)
RBC MORPH BLD: ABNORMAL
SODIUM SERPL-SCNC: 137 MMOL/L (ref 136–145)
SP GR UR REFRACTOMETRY: 1.02 (ref 1–1.03)
UROBILINOGEN UR QL STRIP.AUTO: 1 EU/DL (ref 0.2–1)
WBC # BLD AUTO: 16.7 K/UL (ref 4.6–13.2)
WBC MORPH BLD: ABNORMAL

## 2019-10-31 PROCEDURE — 80048 BASIC METABOLIC PNL TOTAL CA: CPT

## 2019-10-31 PROCEDURE — 85025 COMPLETE CBC W/AUTO DIFF WBC: CPT

## 2019-10-31 PROCEDURE — 36415 COLL VENOUS BLD VENIPUNCTURE: CPT

## 2019-10-31 PROCEDURE — 74011636320 HC RX REV CODE- 636/320: Performed by: HOSPITALIST

## 2019-10-31 PROCEDURE — 81003 URINALYSIS AUTO W/O SCOPE: CPT

## 2019-10-31 PROCEDURE — 85730 THROMBOPLASTIN TIME PARTIAL: CPT

## 2019-10-31 PROCEDURE — 65610000006 HC RM INTENSIVE CARE

## 2019-10-31 PROCEDURE — 77010033678 HC OXYGEN DAILY

## 2019-10-31 PROCEDURE — 74011636637 HC RX REV CODE- 636/637: Performed by: HOSPITALIST

## 2019-10-31 PROCEDURE — 3331090002 HH PPS REVENUE DEBIT

## 2019-10-31 PROCEDURE — 71275 CT ANGIOGRAPHY CHEST: CPT

## 2019-10-31 PROCEDURE — 74011000250 HC RX REV CODE- 250: Performed by: HOSPITALIST

## 2019-10-31 PROCEDURE — 74011250636 HC RX REV CODE- 250/636: Performed by: INTERNAL MEDICINE

## 2019-10-31 PROCEDURE — 3331090001 HH PPS REVENUE CREDIT

## 2019-10-31 PROCEDURE — 74011250636 HC RX REV CODE- 250/636: Performed by: HOSPITALIST

## 2019-10-31 PROCEDURE — 82962 GLUCOSE BLOOD TEST: CPT

## 2019-10-31 PROCEDURE — 77030013140 HC MSK NEB VYRM -A

## 2019-10-31 PROCEDURE — 80307 DRUG TEST PRSMV CHEM ANLYZR: CPT

## 2019-10-31 PROCEDURE — 74011250637 HC RX REV CODE- 250/637: Performed by: HOSPITALIST

## 2019-10-31 PROCEDURE — 83735 ASSAY OF MAGNESIUM: CPT

## 2019-10-31 PROCEDURE — 74011250636 HC RX REV CODE- 250/636: Performed by: EMERGENCY MEDICINE

## 2019-10-31 PROCEDURE — 94640 AIRWAY INHALATION TREATMENT: CPT

## 2019-10-31 RX ORDER — DILTIAZEM HYDROCHLORIDE 30 MG/1
30 TABLET, FILM COATED ORAL
Status: DISCONTINUED | OUTPATIENT
Start: 2019-11-01 | End: 2019-11-02 | Stop reason: HOSPADM

## 2019-10-31 RX ORDER — TAMSULOSIN HYDROCHLORIDE 0.4 MG/1
0.4 CAPSULE ORAL EVERY EVENING
Status: DISCONTINUED | OUTPATIENT
Start: 2019-10-31 | End: 2019-11-02 | Stop reason: HOSPADM

## 2019-10-31 RX ADMIN — BUDESONIDE 500 MCG: 0.5 INHALANT RESPIRATORY (INHALATION) at 20:12

## 2019-10-31 RX ADMIN — METHYLPREDNISOLONE SODIUM SUCCINATE 60 MG: 125 INJECTION, POWDER, FOR SOLUTION INTRAMUSCULAR; INTRAVENOUS at 00:45

## 2019-10-31 RX ADMIN — IPRATROPIUM BROMIDE AND ALBUTEROL SULFATE 3 ML: .5; 3 SOLUTION RESPIRATORY (INHALATION) at 04:01

## 2019-10-31 RX ADMIN — ARFORMOTEROL TARTRATE 15 MCG: 15 SOLUTION RESPIRATORY (INHALATION) at 07:15

## 2019-10-31 RX ADMIN — IPRATROPIUM BROMIDE AND ALBUTEROL SULFATE 3 ML: .5; 3 SOLUTION RESPIRATORY (INHALATION) at 00:54

## 2019-10-31 RX ADMIN — DIPHENHYDRAMINE HYDROCHLORIDE 12.5 MG: 50 INJECTION, SOLUTION INTRAMUSCULAR; INTRAVENOUS at 22:03

## 2019-10-31 RX ADMIN — METHYLPREDNISOLONE SODIUM SUCCINATE 40 MG: 40 INJECTION, POWDER, FOR SOLUTION INTRAMUSCULAR; INTRAVENOUS at 13:25

## 2019-10-31 RX ADMIN — METHYLPREDNISOLONE SODIUM SUCCINATE 40 MG: 40 INJECTION, POWDER, FOR SOLUTION INTRAMUSCULAR; INTRAVENOUS at 22:03

## 2019-10-31 RX ADMIN — ARFORMOTEROL TARTRATE 15 MCG: 15 SOLUTION RESPIRATORY (INHALATION) at 20:12

## 2019-10-31 RX ADMIN — METHYLPREDNISOLONE SODIUM SUCCINATE 60 MG: 125 INJECTION, POWDER, FOR SOLUTION INTRAMUSCULAR; INTRAVENOUS at 05:51

## 2019-10-31 RX ADMIN — IOPAMIDOL 100 ML: 755 INJECTION, SOLUTION INTRAVENOUS at 09:42

## 2019-10-31 RX ADMIN — HEPARIN SODIUM 13 UNITS/KG/HR: 10000 INJECTION, SOLUTION INTRAVENOUS at 14:50

## 2019-10-31 RX ADMIN — IPRATROPIUM BROMIDE AND ALBUTEROL SULFATE 3 ML: .5; 3 SOLUTION RESPIRATORY (INHALATION) at 14:43

## 2019-10-31 RX ADMIN — INSULIN LISPRO 2 UNITS: 100 INJECTION, SOLUTION INTRAVENOUS; SUBCUTANEOUS at 00:55

## 2019-10-31 RX ADMIN — Medication 10 ML: at 22:03

## 2019-10-31 RX ADMIN — LEVOFLOXACIN 500 MG: 5 INJECTION, SOLUTION INTRAVENOUS at 16:54

## 2019-10-31 RX ADMIN — BUDESONIDE 500 MCG: 0.5 INHALANT RESPIRATORY (INHALATION) at 07:15

## 2019-10-31 RX ADMIN — INSULIN LISPRO 2 UNITS: 100 INJECTION, SOLUTION INTRAVENOUS; SUBCUTANEOUS at 18:30

## 2019-10-31 RX ADMIN — TAMSULOSIN HYDROCHLORIDE 0.4 MG: 0.4 CAPSULE ORAL at 17:02

## 2019-10-31 NOTE — PROGRESS NOTES
DC Plan: TBD, [facility] v [address]  Chart reviewed. Pt admitted to ICU. Pt noted to be readmit. Provider, please consider PT/OT eval orders once medically appropriate to assist with dc planning. Provider, please consider palliative care consult due to high RRAT score and readmit. Per previous CM notes from last admission pt dc home with [facility]. CM will follow for transition of care needs, [phone].    1045  Met with pt and his daughter at bedside. Pt gave verbal permission to discuss care with daughter present. Daughter states she lives 40 min away. Pt states he lives with his wife. States family drives him to appts. Pt states he has cane, RW. States he has home oxygen thru [address]. Pt has neb machine. Pt states his PCP is Shayan. Pt states he sees pulmonologist MD Jesenia Edmonds. Pt states he is active with [facility]. States he previously dc to [facility]. FOC offered for both [facility] and SNF. Pt chose [facility] or [facility], referrals will be placed. No immediate dc concerns identified. CM will cont to follow. Reason for Readmission:  Per H&P, pt is \"is a 66 y.o. male who with past medical history of COPD, on home oxygen 2 L, asbestosis, PAF was sent to ER per family due to shortness of breath for 1 day. Per family reported, he was seen to the bathroom and asking for help due to cannot breathe. First ABG pH was 7.188, CO2 79. BiPAP was put on. IV steroid and breathing treatment were performed in ER. Repeated ABG mild improving. A. fib with RVR was notified on EKG. 30 mg Cardizem drip was given in ER, heart rate got some responded but still elevated. Cardizem drip was started in the ER, have to titrated from 5 to 10-15. He was seen in ER. wife and grandchildren were at bedside. Wife said his  has been suffering for breathing issue, he is afraid of going out due to shortness of breath. No chest pain reported. \"            RRAT Score and Risk Level:     high     Level of Readmission:          Care Conference scheduled: n/a       Resources/supports as identified by patient/family:          Top Challenges facing patient (as identified by patient/family and CM): Finances/Medication cost?       Transportation      FAMILY  Support system or lack thereof? WIFE, DAUGHTER  Living arrangements? LIVES WITH WIFE   Self-care/ADLs/Cognition? ALERT AND ORIENTED, HAS DME       Current Advanced Directive/Advance Care Plan: On file           Plan for utilizing home health:   [place] v SNF? Transition of Care Plan:    Based on readmission, the patient's previous Plan of Care   has been evaluated and/or modified. The current Transition of Care Plan is:       TBD    Care Management Interventions  PCP Verified by CM: Yes  Mode of Transport at Discharge:  Other (see comment)(family)  Transition of Care Consult (CM Consult): Discharge Planning  Current Support Network: Lives with Spouse, Family Lives Nearby(daughter lives 40 min away)  Confirm Follow Up Transport: Family  Freedom of Choice Offered: Yes  Discharge Location  Discharge Placement: Home with home health(v SNF)

## 2019-10-31 NOTE — DIABETES MGMT
GLYCEMIC CONTROL PROGRESS NOTE:    - discussed in rounds, no known h/o DM  - Solumedrol 60 mg Q 8 hours, steroid-associated hyperglycemia  - TDD = 2 units - Humalog Normal Insulin Sensitivity Corrective Coverage    Recent Glucose Results:   Lab Results   Component Value Date/Time     (H) 10/31/2019 01:30 AM     (H) 10/30/2019 03:05 PM    GLUCPOC 143 (H) 10/31/2019 05:51 AM    GLUCPOC 159 (H) 10/31/2019 12:41 AM    GLUCPOC 156 (H) 10/30/2019 07:44 PM    GLUCPOC 155 (H) 10/30/2019 03:27 PM     Osmin Rodríguez MS, RN, CDE  Glycemic Control Team  [phone]  Pager [phone] (M-TH 8:00-4:30P)  *After Hours pager [phone]

## 2019-10-31 NOTE — PROGRESS NOTES
TRANSFER - IN REPORT:    Verbal report received from BETTE Ndiaye RN(name) on [facility] Search  being received from ED(unit) for routine progression of care      Report consisted of patients Situation, Background, Assessment and   Recommendations(SBAR). Information from the following report(s) SBAR, Intake/Output, MAR, Cardiac Rhythm afib and Alarm Parameters  was reviewed with the receiving nurse. Opportunity for questions and clarification was provided. Assessment completed upon patients arrival to unit and care assumed. APTT pending at this time. Heparin due when result is available.

## 2019-10-31 NOTE — CONSULTS
[facility]  Pulmonary, Critical Care, and Sleep Medicine    Name: Giacomo Hebert MRN: [id number]   : 1945 Hospital: [facility] [place]    Date: 10/31/2019  Room: 102/01     [facility] Note                                              Consult requesting physician: Dr. Geoffrey Miller  Reason for Consult: CODP exacerbation, AFib with RVR      IMPRESSION:   ·   · Acute on chronic hypercarbic and hypoxic respiratory failure. · A. fib with RVR. ·   Patient Active Problem List   Diagnosis Code    Hypertension I10    COPD (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 ([facility]) J44.9    Chronic renal disease, stage 3, moderately decreased glomerular filtration rate between 30-59 mL/min/1.73 square meter ([facility]) N18.3    Asbestosis ([facility] J61    Acute exacerbation of chronic obstructive pulmonary disease (COPD) ([facility] J44.1    Advanced care planning/counseling discussion Z71.89    Debility R53.81    Pneumonia of right lower lobe due to methicillin susceptible Staphylococcus aureus (MSSA) ([facility] J15.211    Paroxysmal atrial fibrillation ([facility]) I48.0    Chronic respiratory failure with hypoxia ([facility]) J96.11    COPD (chronic obstructive pulmonary disease) ([facility]) J44.9    Acute on chronic respiratory failure with hypoxia and hypercapnia ([facility]) J96.21, J96.22    Atrial fibrillation with RVR ([facility]) I48.91 ·       · Code status: Full code      RECOMMENDATIONS:   Respiratory: Admitted with sudden onset of dyspnea associated with coughing and wheezing. CTA negative for PE. No pneumonia. Chronic pleural space calcification and collection which has been stable since 2014. Initially required BiPAP, now on nasal cannula home dose. Respiratory status improved. Continue Brovana nebulizer twice daily. Continue Pulmicort nebulizer twice daily. Continue DuoNeb as needed. Continue Solu-Medrol, reduce dose to 40 mg every 8 hour. Levaquin for COPD exacerbation. Keep SPO2 >=92%.  HOB 30 degree elevation all the time. Aggressive pulmonary toileting. Aspiration precautions. Incentive spirometry. CVS: Chronic history of A. caitlyn, he is not on anticoagulation at home for some reason. Admitted with A. caitlyn with RVR. Initially required Cardizem bolus followed by drip. Off Cardizem drip now. On heparin drip. Echo noted. Defer to Dr. Lian Moore. ID: Leukocytosis could be due to stress and possible bronchitis. Continue Levaquin. Not producing sputum for collection/culture. Blood culture NGTD. Follow cultures. Deescalate antibiotic when appropriate. Hematology/Oncology: Chronic anemia. No acute bleeding. Renal: Hx CKD. Monitor renal function. Stable currently. GI/: No acute issues. Endocrine: Monitor BS. SSI. Monitor blood sugar closely while on high-dose steroid. Neurology: No acute issues  Toxicology: Drug screen negative  Pain/Sedation: No acute issues  Skin/Wound: No acute issues  Electrolytes: Replace electrolytes per ICU electrolyte replacement protocol. IVF: None  Nutrition: PO  Prophylaxis: DVT Prophylaxis: SCD/heparin. GI Prophylaxis: Low risk for stress ulcer. Restraints: none  Lines/Tubes: PIV    Will defer respective systems problem management to primary and other respective consultant and follow patient in ICU with primary and other medical team.  Further recommendations will be based on the patient's response to recommended treatment and results of the investigation ordered. Quality Care: PPI, DVT prophylaxis, HOB elevated, Infection control all reviewed and addressed. Care of plan d/w RN, RT, MDR.  D/w patient, family-daughter (answered all questions to satisfaction). High complexity decision making was performed during the evaluation of this patient at high risk for decompensation with multiple organ involvement. Total critical care time spent rendering care exclusive of procedures: 36 minutes.          Subjective/History of Present Illness:     Patient is a 66 y.o. male with PMHx significant for HTN, CKD, paroxysmal A. 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 fib with RVR. Due to recurrent pulmonary infection, ICS was discontinued during last hospitalization 08/2019 and he was started on chronic azithromycin to 50 mg Monday Wednesday and Friday. He supposed to be on Anoro Ellipta at home. Being followed by Dr. Kathryn Heller, pulmonary in office. Patient canceled last office follow-up as he could not come to office. Complaint of sudden onset of dyspnea started 10/30/2019, with associated wheezing, dry cough without sputum production or hemoptysis. Denies leg edema or PND. No lightheadedness, dizziness, palpitation. Found to have A. fib with RVR, started on Cardizem and admitted. He is not on any anticoagulation at home. 10/31/2019:  Admitted to ICU with A. fib with RVR and COPD exacerbation. Required Cardizem drip and heparin drip. Required BiPAP. Now on nasal cannula oxygen. Off Cardizem drip. Still on heparin drip. Ventricular rate/A. fib rate is better controlled. Dyspnea improved. Mild dry cough now without sputum production or hemoptysis. No chest pain. Wheezing improved. Overall respiratory status improved. I/O last 24 hrs: Intake/Output Summary (Last 24 hours) at 10/31/2019 1144  Last data filed at 10/31/2019 0800  Gross per 24 hour   Intake 481.9 ml   Output 775 ml   Net -293.1 ml         The patient is critically ill.   History taken from patient, EMR     Review of Systems:   HEENT: No epistaxis, no nasal drainage, no difficulty in swallowing, no redness in eyes  Respiratory: as above  Cardiovascular: no chest pain, no palpitations, no chronic leg edema, no syncope  Gastrointestinal: no abd pain, no vomiting, no diarrhea, no bleeding symptoms  Genitourinary: No urinary symptoms or hematuria  Musculoskeletal: Neg  Neurological: No focal weakness, no seizures, no headaches  Behvioral/Psych: No anxiety, no depression  Constitutional: No fever, no chills, no weight loss, no night sweats     Allergies   Allergen Reactions    Aspirin Other (comments)     \"messes my stomach up\"      Past Medical History:   Diagnosis Date    Cancer ([facility]     prostate    COPD (chronic obstructive pulmonary disease) ([facility]     Hypertension     Nocturia     Pneumonia     Radiation effect       Past Surgical History:   Procedure Laterality Date    HX HERNIA REPAIR        Social History     Tobacco Use    Smoking status: Former Smoker     Types: Cigarettes    Smokeless tobacco: Never Used   Substance Use Topics    Alcohol use: No      Family History   Problem Relation Age of Onset    Heart Disease Mother       Prior to Admission medications    Medication Sig Start Date End Date Taking? Authorizing Provider   ibuprofen (ADVIL) 100 mg tablet Take 100 mg by mouth every six (6) hours as needed for Pain. Yes Provider, Historical   acetaminophen (TYLENOL) 325 mg tablet Take 500 mg by mouth every four (4) hours as needed for Pain. Yes Provider, Historical   predniSONE (DELTASONE) 10 mg tablet Prednisone 10mg tabs: p.o.  4 tabs daily for 2 days then drop to   3 tabs daily for 2 days then drop to   2 tabs daily for 2 days then drop to   1 tab daily for 2 days then stop. Dispense 20 tabs 10/22/19  Yes Alexandre Castillo MD   OXYGEN-AIR DELIVERY SYSTEMS Take 2 L by inhalation continuous. Yes Provider, Historical   fluticasone propionate (FLONASE) 50 mcg/actuation nasal spray 2 Sprays by Both Nostrils route daily. Yes Provider, Historical   dextran 70/hypromellose (ARTIFICIAL TEARS, PF, OP) Apply 2 Drops to eye as needed for Other (both eyes for dryness).    Yes Provider, Historical   diphenhydrAMINE (BENADRYL) 25 mg capsule Take 25 mg by mouth nightly as needed for Itching. Yes Provider, Historical   albuterol-ipratropium (DUO-NEB) 2.5 mg-0.5 mg/3 ml nebu 3 mL by Nebulization route every four (4) hours as needed. Patient taking differently: 3 mL by Nebulization route every four (4) hours as needed for Other (Shortness of breath). 19  Yes Yanira Haji,    albuterol (PROVENTIL HFA, VENTOLIN HFA, PROAIR HFA) 90 mcg/actuation inhaler Take 2 Puffs by inhalation every four (4) hours as needed for Wheezing or Shortness of Breath. 18  Yes Indiana Colón PA-C   ipratropium (ATROVENT) 0.03 % nasal spray 2 Sprays by Both Nostrils route every twelve (12) hours as needed for Rhinitis. Yes Shant, MD Blayne   tamsulosin (FLOMAX) 0.4 mg capsule Take 0.4 mg by mouth every evening. Yes Shant, MD Blayne   chlorthalidone (HYGROTEN) 25 mg tablet Take  by mouth daily.    Yes Shant, MD Blayne     Current Facility-Administered Medications   Medication Dose Route Frequency    tamsulosin (FLOMAX) capsule 0.4 mg  0.4 mg Oral QPM    dilTIAZem (CARDIZEM) 125 mg in 0.9% sodium chloride 125 mL infusion  0-15 mg/hr IntraVENous TITRATE    nicotine (NICODERM CQ) 14 mg/24 hr patch 1 Patch  1 Patch TransDERmal Q24H    methylPREDNISolone (PF) (Solu-MEDROL) injection 60 mg  60 mg IntraVENous Q6H    heparin 25,000 units in D5W 250 ml infusion  18-36 Units/kg/hr IntraVENous TITRATE    budesonide (PULMICORT) 500 mcg/2 ml nebulizer suspension  500 mcg Nebulization BID RT    arformoterol (BROVANA) neb solution 15 mcg  15 mcg Nebulization BID RT    levoFLOXacin (LEVAQUIN) 500 mg in D5W IVPB  500 mg IntraVENous Q24H    insulin lispro (HUMALOG) injection   SubCUTAneous Q6H         Objective:   Vital Signs:    Visit Vitals  /70   Pulse 76   Temp 98.2 °F (36.8 °C)   Resp 18   Ht 6' (1.829 m)   Wt 91.2 kg (201 lb 1.6 oz)   SpO2 98%   BMI 27.27 kg/m²       O2 Device: Nasal cannula   O2 Flow Rate (L/min): 2 l/min   Temp (24hrs), Av.6 °F (36.4 °C), Min:96.1 °F (35.6 °C), Max:98.2 °F (36.8 °C)       Intake/Output:   Last shift:      No intake/output data recorded. Last 3 shifts: 10/29 1901 - 10/31 0700  In: 481.9 [P.O.:100; I.V.:381.9]  Out: 775 [Urine:775]      Intake/Output Summary (Last 24 hours) at 10/31/2019 1144  Last data filed at 10/31/2019 0800  Gross per 24 hour   Intake 481.9 ml   Output 775 ml   Net -293.1 ml       Last 3 Recorded Weights in this Encounter    10/30/19 1504   Weight: 91.2 kg (201 lb 1.6 oz)           Recent Labs     10/30/19  2004 10/30/19  1627 10/30/19  1523   PHI 7.375 7.242* 7.188*   PCO2I 43.5 63.9* 79.2*   PO2I 128* 105* 512*   HCO3I 25.6 27.5* 30.1*   FIO2I 0.3 50 100       Physical Exam:     General/Neurology: Alert, Awake, NAD. No accessory muscle use. Nasal cannula oxygen. Head:   Normocephalic, without obvious abnormality, atraumatic. Eye:   EOM intact, no scleral icterus, no pallor, no cyanosis. Throat:  Lips, mucosa, and tongue normal. No oral thrush. Neck:   Supple, symmetric. No lymphadenopathy. Trachea midline  Lung: Moderate air entry bilateral equal.  No crepitus/no rhonchi. No wheezing. Mildly prolonged expiration. No accessory muscle use. Heart:   Irregular. S1 S2 present. No murmur. No JVD. Abdomen:  Soft. NT. ND. +BS. No masses. Extremities:  No pedal edema. No cyanosis. No clubbing. Pulses: 2+ and symmetric in DP. Capillary refill: normal  Lymphatic:  No cervical or supraclavicular palpable lymphadenopathy. Musculoskeletal: No joint swelling. No tenderness. Skin:   Color, texture, turgor normal. No rashes or lesions.        Data:       Recent Results (from the past 24 hour(s))   CULTURE, BLOOD    Collection Time: 10/30/19  3:05 PM   Result Value Ref Range    Special Requests: NO SPECIAL REQUESTS      Culture result: NO GROWTH AFTER 13 HOURS     METABOLIC PANEL, COMPREHENSIVE    Collection Time: 10/30/19  3:05 PM   Result Value Ref Range    Sodium 137 136 - 145 mmol/L    Potassium 4.9 3.5 - 5.5 mmol/L    Chloride 103 100 - 111 mmol/L    CO2 30 21 - 32 mmol/L    Anion gap 4 3.0 - 18 mmol/L    Glucose 154 (H) 74 - 99 mg/dL    BUN 30 (H) 7.0 - 18 MG/DL    Creatinine 1.24 0.6 - 1.3 MG/DL    BUN/Creatinine ratio 24 (H) 12 - 20      GFR est AA >60 >60 ml/min/1.73m2    GFR est non-AA 57 (L) >60 ml/min/1.73m2    Calcium 8.1 (L) 8.5 - 10.1 MG/DL    Bilirubin, total 0.4 0.2 - 1.0 MG/DL    ALT (SGPT) 19 16 - 61 U/L    AST (SGOT) 10 10 - 38 U/L    Alk. phosphatase 80 45 - 117 U/L    Protein, total 6.4 6.4 - 8.2 g/dL    Albumin 3.7 3.4 - 5.0 g/dL    Globulin 2.7 2.0 - 4.0 g/dL    A-G Ratio 1.4 0.8 - 1.7     CBC WITH AUTOMATED DIFF    Collection Time: 10/30/19  3:05 PM   Result Value Ref Range    WBC 18.6 (H) 4.6 - 13.2 K/uL    RBC 4.06 (L) 4.70 - 5.50 M/uL    HGB 12.0 (L) 13.0 - 16.0 g/dL    HCT 37.7 36.0 - 48.0 %    MCV 92.9 74.0 - 97.0 FL    MCH 29.6 24.0 - 34.0 PG    MCHC 31.8 31.0 - 37.0 g/dL    RDW 14.5 11.6 - 14.5 %    PLATELET 953 495 - 238 K/uL    MPV 8.8 (L) 9.2 - 11.8 FL    NEUTROPHILS 55 42 - 75 %    BAND NEUTROPHILS 1 0 - 5 %    LYMPHOCYTES 31 20 - 51 %    MONOCYTES 10 (H) 2 - 9 %    EOSINOPHILS 3 0 - 5 %    BASOPHILS 0 0 - 3 %    ABS. NEUTROPHILS 10.2 (H) 1.8 - 8.0 K/UL    ABS. LYMPHOCYTES 5.8 (H) 0.8 - 3.5 K/UL    ABS. MONOCYTES 1.9 (H) 0 - 1.0 K/UL    ABS. EOSINOPHILS 0.6 (H) 0.0 - 0.4 K/UL    ABS.  BASOPHILS 0.0 0.0 - 0.1 K/UL    RBC COMMENTS NORMOCYTIC, NORMOCHROMIC      DF MANUAL     NT-PRO BNP    Collection Time: 10/30/19  3:05 PM   Result Value Ref Range    NT pro- 0 - 1,800 PG/ML   PTT    Collection Time: 10/30/19  3:05 PM   Result Value Ref Range    aPTT 22.8 (L) 23.0 - 36.4 SEC   D DIMER    Collection Time: 10/30/19  3:05 PM   Result Value Ref Range    D DIMER 0.50 (H) <0.46 ug/ml(FEU)   POC LACTIC ACID    Collection Time: 10/30/19  3:08 PM   Result Value Ref Range    Lactic Acid (POC) 0.72 0.40 - 2.00 mmol/L   EKG, 12 LEAD, INITIAL    Collection Time: 10/30/19  3:14 PM   Result Value Ref Range    Ventricular Rate 142 BPM    Atrial Rate 357 BPM    QRS Duration 82 ms    Q-T Interval 294 ms    QTC Calculation (Bezet) 452 ms    Calculated R Axis 62 degrees    Calculated T Axis 89 degrees    Diagnosis       Atrial fibrillation with rapid ventricular response with premature   ventricular or aberrantly conducted complexes  Abnormal ECG  Confirmed by Delma Lei MD, Telly Duke [id number]) on 10/30/2019 11:59:18 PM     CULTURE, BLOOD    Collection Time: 10/30/19  3:20 PM   Result Value Ref Range    Special Requests: NO SPECIAL REQUESTS      Culture result: NO GROWTH AFTER 13 HOURS     POC G3    Collection Time: 10/30/19  3:23 PM   Result Value Ref Range    Device: BIPAP      FIO2 (POC) 100 %    pH (POC) 7.188 (LL) 7.35 - 7.45      pCO2 (POC) 79.2 (H) 35.0 - 45.0 MMHG    pO2 (POC) 512 (H) 80 - 100 MMHG    HCO3 (POC) 30.1 (H) 22 - 26 MMOL/L    sO2 (POC) 100 (H) 92 - 97 %    Base excess (POC) 2 mmol/L    PEEP/CPAP (POC) 8 cmH2O    PIP (POC) 16      Allens test (POC) YES      Total resp.  rate 26      Site RIGHT RADIAL      Specimen type (POC) ARTERIAL      Performed by Jonas Downey     Spontaneous timed YES     POC CHEM8    Collection Time: 10/30/19  3:27 PM   Result Value Ref Range    CO2, POC 29 (H) 19 - 24 MMOL/L    Glucose,  (H) 74 - 106 MG/DL    BUN, POC 30 (H) 7 - 18 MG/DL    Creatinine, POC 1.3 0.6 - 1.3 MG/DL    GFRAA, POC >60 >60 ml/min/1.73m2    GFRNA, POC 54 (L) >60 ml/min/1.73m2    Sodium,  136 - 145 MMOL/L    Potassium, POC 4.7 3.5 - 5.5 MMOL/L    Calcium, ionized (POC) 1.15 1.12 - 1.32 mmol/L    Chloride,  100 - 108 MMOL/L    Anion gap, POC 12 10 - 20      Hematocrit, POC 46 36 - 49 %    Hemoglobin, POC 15.6 12 - 16 G/DL   POC TROPONIN-I    Collection Time: 10/30/19  3:30 PM   Result Value Ref Range    Troponin-I (POC) <0.04 0.00 - 0.08 ng/mL   EKG, 12 LEAD, SUBSEQUENT    Collection Time: 10/30/19  3:42 PM   Result Value Ref Range    Ventricular Rate 115 BPM    Atrial Rate 110 BPM QRS Duration 74 ms    Q-T Interval 276 ms    QTC Calculation (Bezet) 381 ms    Calculated R Axis 62 degrees    Calculated T Axis 72 degrees    Diagnosis       Atrial fibrillation with rapid ventricular response with premature   ventricular or aberrantly conducted complexes  Abnormal ECG  Confirmed by Pola Lee MD, [employer] [id number]) on 10/30/2019 11:59:43 PM     POC G3    Collection Time: 10/30/19  4:27 PM   Result Value Ref Range    Device: BIPAP      FIO2 (POC) 50 %    pH (POC) 7.242 (LL) 7.35 - 7.45      pCO2 (POC) 63.9 (H) 35.0 - 45.0 MMHG    pO2 (POC) 105 (H) 80 - 100 MMHG    HCO3 (POC) 27.5 (H) 22 - 26 MMOL/L    sO2 (POC) 97 92 - 97 %    Base excess (POC) 0 mmol/L    PEEP/CPAP (POC) 7 cmH2O    PIP (POC) 12      Allens test (POC) YES      Total resp. rate 25      Site RIGHT RADIAL      Specimen type (POC) ARTERIAL      Performed by VELVET Henley YES     GLUCOSE, POC    Collection Time: 10/30/19  7:44 PM   Result Value Ref Range    Glucose (POC) 156 (H) 70 - 110 mg/dL   POC G3    Collection Time: 10/30/19  8:04 PM   Result Value Ref Range    Device: BIPAP      FIO2 (POC) 0.3 %    pH (POC) 7.375 7.35 - 7.45      pCO2 (POC) 43.5 35.0 - 45.0 MMHG    pO2 (POC) 128 (H) 80 - 100 MMHG    HCO3 (POC) 25.6 22 - 26 MMOL/L    sO2 (POC) 99 (H) 92 - 97 %    Base excess (POC) 0 mmol/L    PEEP/CPAP (POC) 6 cmH2O    PIP (POC) 11      Pressure support 5 cmH2O    Allens test (POC) N/A      Total resp.  rate 20      Site RIGHT RADIAL      Patient temp. 97.4      Specimen type (POC) ARTERIAL      Performed by VELVET Sloan YES     GLUCOSE, POC    Collection Time: 10/31/19 12:41 AM   Result Value Ref Range    Glucose (POC) 159 (H) 70 - 421 mg/dL   METABOLIC PANEL, BASIC    Collection Time: 10/31/19  1:30 AM   Result Value Ref Range    Sodium 137 136 - 145 mmol/L    Potassium 5.2 3.5 - 5.5 mmol/L    Chloride 102 100 - 111 mmol/L    CO2 24 21 - 32 mmol/L    Anion gap 11 3.0 - 18 mmol/L    Glucose 153 (H) 74 - 99 mg/dL    BUN 38 (H) 7.0 - 18 MG/DL    Creatinine 1.33 (H) 0.6 - 1.3 MG/DL    BUN/Creatinine ratio 29 (H) 12 - 20      GFR est AA >60 >60 ml/min/1.73m2    GFR est non-AA 52 (L) >60 ml/min/1.73m2    Calcium 8.3 (L) 8.5 - 10.1 MG/DL   MAGNESIUM    Collection Time: 10/31/19  1:30 AM   Result Value Ref Range    Magnesium 2.1 1.6 - 2.6 mg/dL   CBC WITH AUTOMATED DIFF    Collection Time: 10/31/19  1:30 AM   Result Value Ref Range    WBC 16.7 (H) 4.6 - 13.2 K/uL    RBC 3.72 (L) 4.70 - 5.50 M/uL    HGB 11.0 (L) 13.0 - 16.0 g/dL    HCT 33.8 (L) 36.0 - 48.0 %    MCV 90.9 74.0 - 97.0 FL    MCH 29.6 24.0 - 34.0 PG    MCHC 32.5 31.0 - 37.0 g/dL    RDW 14.4 11.6 - 14.5 %    PLATELET 498 706 - 088 K/uL    MPV 8.7 (L) 9.2 - 11.8 FL    NEUTROPHILS 92 (H) 42 - 75 %    BAND NEUTROPHILS 3 0 - 5 %    LYMPHOCYTES 3 (L) 20 - 51 %    MONOCYTES 1 (L) 2 - 9 %    EOSINOPHILS 0 0 - 5 %    BASOPHILS 0 0 - 3 %    METAMYELOCYTES 1 (H) 0 %    ABS. NEUTROPHILS 15.4 (H) 1.8 - 8.0 K/UL    ABS. LYMPHOCYTES 0.5 (L) 0.8 - 3.5 K/UL    ABS. MONOCYTES 0.2 0 - 1.0 K/UL    ABS. EOSINOPHILS 0.0 0.0 - 0.4 K/UL    ABS.  BASOPHILS 0.0 0.0 - 0.1 K/UL    PLATELET COMMENTS FEW LARGE PLATELETS     RBC COMMENTS NORMOCYTIC, NORMOCHROMIC      WBC COMMENTS TOXIC GRANULATION      DF MANUAL     PTT    Collection Time: 10/31/19  1:30 AM   Result Value Ref Range    aPTT 174.9 (HH) 23.0 - 36.4 SEC   URINALYSIS W/ RFLX MICROSCOPIC    Collection Time: 10/31/19  4:00 AM   Result Value Ref Range    Color YELLOW      Appearance CLEAR      Specific gravity 1.021 1.005 - 1.030      pH (UA) 5.5 5.0 - 8.0      Protein NEGATIVE  NEG mg/dL    Glucose NEGATIVE  NEG mg/dL    Ketone NEGATIVE  NEG mg/dL    Bilirubin NEGATIVE  NEG      Blood NEGATIVE  NEG      Urobilinogen 1.0 0.2 - 1.0 EU/dL    Nitrites NEGATIVE  NEG      Leukocyte Esterase NEGATIVE  NEG     DRUG SCREEN, URINE    Collection Time: 10/31/19  4:00 AM   Result Value Ref Range    BENZODIAZEPINES NEGATIVE  NEG BARBITURATES NEGATIVE  NEG      THC (TH-CANNABINOL) NEGATIVE  NEG      OPIATES NEGATIVE  NEG      PCP(PHENCYCLIDINE) NEGATIVE  NEG      COCAINE NEGATIVE  NEG      AMPHETAMINES NEGATIVE  NEG      METHADONE NEGATIVE  NEG      HDSCOM (NOTE)    GLUCOSE, POC    Collection Time: 10/31/19  5:51 AM   Result Value Ref Range    Glucose (POC) 143 (H) 70 - 110 mg/dL   PTT    Collection Time: 10/31/19  7:44 AM   Result Value Ref Range    aPTT 98.7 (H) 23.0 - 36.4 SEC   PTT    Collection Time: 10/31/19 10:20 AM   Result Value Ref Range    aPTT 135.1 (H) 23.0 - 36.4 SEC         Chemistry Recent Labs     10/31/19  0130 10/30/19  1505   * 154*    137   K 5.2 4.9    103   CO2 24 30   BUN 38* 30*   CREA 1.33* 1.24   CA 8.3* 8.1*   MG 2.1  --    AGAP 11 4   BUCR 29* 24*   AP  --  80   TP  --  6.4   ALB  --  3.7   GLOB  --  2.7   AGRAT  --  1.4        Lactic Acid Lactic acid   Date Value Ref Range Status   07/07/2019 1.4 0.4 - 2.0 MMOL/L Final     No results for input(s): LAC in the last 72 hours. Liver Enzymes Protein, total   Date Value Ref Range Status   10/30/2019 6.4 6.4 - 8.2 g/dL Final     Albumin   Date Value Ref Range Status   10/30/2019 3.7 3.4 - 5.0 g/dL Final     Globulin   Date Value Ref Range Status   10/30/2019 2.7 2.0 - 4.0 g/dL Final     A-G Ratio   Date Value Ref Range Status   10/30/2019 1.4 0.8 - 1.7   Final     AST (SGOT)   Date Value Ref Range Status   10/30/2019 10 10 - 38 U/L Final     Alk.  phosphatase   Date Value Ref Range Status   10/30/2019 80 45 - 117 U/L Final     Recent Labs     10/30/19  1505   TP 6.4   ALB 3.7   GLOB 2.7   AGRAT 1.4   SGOT 10   AP 80        CBC w/Diff Recent Labs     10/31/19  0130 10/30/19  1505   WBC 16.7* 18.6*   RBC 3.72* 4.06*   HGB 11.0* 12.0*   HCT 33.8* 37.7    390   GRANS 92* 55   LYMPH 3* 31   EOS 0 3        Cardiac Enzymes Lab Results   Component Value Date/Time    TNIPOC <0.04 10/30/2019 03:30 PM        BNP No results found for: BNP, BNPP, XBNPT Coagulation Recent Labs     10/31/19  1020 10/31/19  0744 10/31/19  0130   APTT 135.1* 98.7* 174.9*         Thyroid  Lab Results   Component Value Date/Time    TSH 0.86 10/20/2019 01:05 AM       No results found for: T4     Urinalysis Lab Results   Component Value Date/Time    Color YELLOW 10/31/2019 04:00 AM    Appearance CLEAR 10/31/2019 04:00 AM    Specific gravity 1.021 10/31/2019 04:00 AM    pH (UA) 5.5 10/31/2019 04:00 AM    Protein NEGATIVE  10/31/2019 04:00 AM    Glucose NEGATIVE  10/31/2019 04:00 AM    Ketone NEGATIVE  10/31/2019 04:00 AM    Bilirubin NEGATIVE  10/31/2019 04:00 AM    Urobilinogen 1.0 10/31/2019 04:00 AM    Nitrites NEGATIVE  10/31/2019 04:00 AM    Leukocyte Esterase NEGATIVE  10/31/2019 04:00 AM    Bacteria FEW (A) 09/08/2018 12:30 PM    WBC 0 to 3 09/08/2018 12:30 PM    RBC 0 to 3 09/08/2018 12:30 PM        Micro  Recent Labs     10/30/19  1520 10/30/19  1505   CULT NO GROWTH AFTER 13 HOURS NO GROWTH AFTER 13 HOURS     Recent Labs     10/30/19  1520 10/30/19  1505   CULT NO GROWTH AFTER 13 HOURS NO GROWTH AFTER 13 HOURS          Culture data during this hospitalization. All Micro Results     Procedure Component Value Units Date/Time    CULTURE, BLOOD [[id number]] Collected:  10/30/19 1505    Order Status:  Completed Specimen:  Blood Updated:  10/31/19 0705     Special Requests: NO SPECIAL REQUESTS        Culture result: NO GROWTH AFTER 13 HOURS       CULTURE, BLOOD [[id number]] Collected:  10/30/19 1520    Order Status:  Completed Specimen:  Blood Updated:  10/31/19 0705     Special Requests: NO SPECIAL REQUESTS        Culture result: NO GROWTH AFTER 13 HOURS                    ECHO 10/21/2019: · Left Ventricle: Normal cavity size and systolic function (ejection fraction normal). Mild concentric hypertrophy. Estimated left ventricular ejection fraction is 51 - 55%. Mild (grade 1) left ventricular diastolic dysfunction. · Aortic Valve: Probably trileaflet aortic valve.   · IVC/Hepatic Veins: Moderately elevated central venous pressure (10-15 mmHg); IVC diameter is larger than 21 mm and collapses more than 50% with respiration. Images report reviewed by me:  CT (Most Recent) (CT chest reviewed by me) Results from Hospital Encounter encounter on 10/30/19   CTA CHEST W OR W WO CONT    Narrative EXAM: CTA Chest    INDICATION: Acute on chronic respiratory failure with hypoxia and hypercapnia. Evaluation for potential embolism. COMPARISON: Correlation made with prior CT scan of the chest obtained July 7, 2019. Correlation made with multiple prior radiographs, most recent 10/30/2019    TECHNIQUE: Axial CT imaging from the thoracic inlet through the diaphragm with  intravenous contrast utilizing CTA study for pulmonary artery evaluation. Coronal and sagittal MIP reformations were generated at a separate workstation. One or more dose reduction techniques were used on this CT: automated exposure  control, adjustment of the mAs and/or kVp according to patient size, and  iterative reconstruction techniques. The specific techniques used on this CT  exam have been documented in the patient's electronic medical record. Digital  Imaging and Communications in Medicine (DICOM) format image data are available  to nonaffiliated external healthcare facilities or entities on a secure, media  free, reciprocally searchable basis with patient authorization for at least a  12-month period after this study. _______________    FINDINGS:    EXAM QUALITY: Overall exam quality is adequate. Pulmonary arterial enhancement  is adequate. The breath hold is satisfactory. PULMONARY ARTERIES: No convincing evidence of pulmonary embolism. MEDIASTINUM: Included thyroid gland contains several small hypodensities which  are too small to require further characterization. Great vessels are of normal  caliber. Thoracic aorta normal in course and caliber. Normal cardiac size. No  pericardial effusion.  Small quantity of fluid within the superior pericardial  recess. LYMPH NODES: No enlarged mediastinal or hilar nodes by size criteria. AIRWAY: Central airways are patent. Small quantity of endobronchial secretions  noted at the distal right mainstem bronchus. No discrete foci of endobronchial  mucoid impaction. LUNGS: There are basilar areas of atelectasis which involve the lower lobes  bilaterally. Significantly improved aeration of the left lower lobe noted in the  interval from prior CT scan. No significant groundglass abnormality or septal  line thickening. No alveolar consolidation. PLEURA: As previously, elliptical morphology peripherally calcified structures  are noted within the anterior aspect of the right pleural space with additional  posterior right hemithoracic peripherally calcified pleural space collection  redemonstrated. This appears unchanged from examination of March 19, 2017 and  demonstrates radiographic stability from 2014. No evidence of pneumothorax or  pleural effusion. UPPER ABDOMEN: Small hiatal hernia. Multiple punctate granulomata throughout the  liver. No acute abnormality present. .    OTHER: No acute or aggressive osseous abnormalities identified. _______________      Impression IMPRESSION:    1. No evidence of pulmonary embolism. 2.  Basilar areas of atelectasis without findings to suggest pneumonia,  pulmonary edema, or pleural effusion. 3. 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 4. Small hiatal hernia. CXR reviewed by me:  XR (Most Recent).  CXR  reviewed by me and compared with previous CXR Results from Hospital Encounter encounter on 10/30/19   XR CHEST PORT    Narrative EXAM: XR CHEST PORT    CLINICAL INDICATION/HISTORY: Respiratory distress  -Additional: None    COMPARISON: Several prior exams, most recently chest radiograph dated 10/20/2019    TECHNIQUE: Frontal view of the chest    _______________    FINDINGS:    HEART AND MEDIASTINUM: Normal cardiac size and mediastinal contours. LUNGS AND PLEURAL SPACES: Elevated right hemidiaphragm and volume loss in the  right hemithorax unchanged from prior examination. No focal pneumonic opacity. No pneumothorax or pleural effusion. Partially peripherally calcified  pleural-based lesion projecting over the right chest wall unchanged from  multiple prior examinations. The linear opacities at each lung base are similar  in appearance to prior. Improved aeration of the right lung base from recent  radiographs. BONY THORAX AND SOFT TISSUES: No acute osseous abnormality    _______________      Impression IMPRESSION:      1. No acute radiographic cardiopulmonary abnormality. Unchanged basilar areas of  scarring or atelectasis.            Sidra Carrel, MD  10/31/2019

## 2019-10-31 NOTE — CONSULTS
[facility] Consult Note      Patient: Armida Kramer MRN: [id number]  SSN: [id number]    YOB: 1945  Age: 66 y.o. Sex: male    Date of Consultation: 10/30/2019  Referring Physician: Rox Peters  Reason for Consultation: Atrial fibrillation    Chief complain: Shortness of breath    HPI: 66year old gentleman brought to emergency room with respiratory distress. Patient is currently on BiPAP. He is complaining of worsening of shortness of breath today and  Came to emergency room. Denies any orthopnea or PND. Denies any chest pain. Cardiology consult called for atrial fibrillation.      Past Medical History:   Diagnosis Date    Cancer [facility])     prostate    COPD (chronic obstructive pulmonary disease) ([facility] [address]     Hypertension     Nocturia     Pneumonia     Radiation effect      Past Surgical History:   Procedure Laterality Date    HX HERNIA REPAIR       Current Facility-Administered Medications   Medication Dose Route Frequency    sodium chloride (NS) flush 5-10 mL  5-10 mL IntraVENous PRN    nitroglycerin (NITROBID) 2 % ointment 1 Inch  1 Inch Topical NOW    dilTIAZem (CARDIZEM) 125 mg in 0.9% sodium chloride 125 mL infusion  0-15 mg/hr IntraVENous TITRATE    acetaminophen (TYLENOL) tablet 650 mg  650 mg Oral Q4H PRN    naloxone (NARCAN) injection 0.4 mg  0.4 mg IntraVENous PRN    diphenhydrAMINE (BENADRYL) injection 12.5 mg  12.5 mg IntraVENous Q4H PRN    ondansetron (ZOFRAN) injection 4 mg  4 mg IntraVENous Q4H PRN    nicotine (NICODERM CQ) 14 mg/24 hr patch 1 Patch  1 Patch TransDERmal Q24H    heparin (porcine) injection 5,000 Units  5,000 Units SubCUTAneous Q8H    methylPREDNISolone (PF) (Solu-MEDROL) injection 60 mg  60 mg IntraVENous Q6H    heparin 25,000 units in D5W 250 ml infusion  18-36 Units/kg/hr IntraVENous TITRATE    budesonide (PULMICORT) 500 mcg/2 ml nebulizer suspension  500 mcg Nebulization BID RT    arformoterol (BROVANA) neb solution 15 mcg  15 mcg Nebulization BID RT    levoFLOXacin (LEVAQUIN) 500 mg in D5W IVPB  500 mg IntraVENous Q24H    insulin lispro (HUMALOG) injection   SubCUTAneous Q6H    glucose chewable tablet 16 g  4 Tab Oral PRN    glucagon (GLUCAGEN) injection 1 mg  1 mg IntraMUSCular PRN    albuterol-ipratropium (DUO-NEB) 2.5 MG-0.5 MG/3 ML  3 mL Nebulization Q4H PRN       Allergies and Intolerances: Allergies   Allergen Reactions    Aspirin Other (comments)     \"messes my stomach up\"       Family History:   Family History   Problem Relation Age of Onset    Heart Disease Mother        Social History:   He  reports that he has quit smoking. His smoking use included cigarettes. He has never used smokeless tobacco.  He  reports that he does not drink alcohol. Review of Systems:     Gen: No fever, chills, malaise, weight loss/gain. Heent: No headache, rhinorrhea, epistaxis, ear pain, hearing loss, sinus pain, neck pain/stiffness, sore throat. Heart: Positive shortness of breath, No chest pain, palpitations, pnd, or orthopnea. Resp: No cough, hemoptysis, wheezing and dyspnea  GI: No nausea, vomiting, diarrhea, constipation, melena or hematochezia. : No urinary obstruction, dysuria or hematuria. Derm: No rash, new skin lesion or pruritis. Musc/skeletal: Positive bone or joint complains. Vasc: No edema, cyanosis or claudication. Endo: No heat/cold intolerance, no polyuria,polydipsia or polyphagia. Neuro: No unilateral weakness, numbness, tingling. No seizures. Heme: No easy bruising or bleeding.       Physical:   Patient Vitals for the past 6 hrs:   Temp Pulse Resp BP SpO2   10/30/19 2230 -- 70 14 121/54 97 %   10/30/19 2215 -- 70 16 -- 97 %   10/30/19 2200 -- 72 16 127/54 96 %   10/30/19 2145 -- 73 19 -- 97 %   10/30/19 2130 -- 72 16 125/54 99 %   10/30/19 2115 -- 69 18 -- 96 %   10/30/19 2100 -- 74 12 127/59 98 %   10/30/19 2045 -- 72 19 119/56 97 %   10/30/19 2030 -- 68 17 117/47 98 %   10/30/19 2015 -- 68 19 116/53 97 %   10/30/19 2000 97.4 °F (36.3 °C) 67 20 128/66 99 %   10/30/19 1953 -- -- -- -- 98 %   10/30/19 1948 97.4 °F (36.3 °C) -- -- -- --   10/30/19 1945 -- 68 21 124/57 99 %   10/30/19 1930 -- -- -- 140/64 --   10/30/19 1915 -- 99 22 (!) 146/100 --   10/30/19 1900 -- (!) 112 20 138/82 96 %   10/30/19 1845 -- (!) 109 20 133/69 99 %   10/30/19 1830 96.1 °F (35.6 °C) (!) 127 19 -- 100 %   10/30/19 1800 -- (!) 132 17 126/84 98 %   10/30/19 1745 -- (!) 118 23 134/87 97 %   10/30/19 1730 -- (!) 131 21 139/77 100 %         Exam:   General Appearance: In mild respiratory distress, using accessory muscles of respiration. HEENT: TAMI. HEAD: Atraumatic  NECK: No JVD, no thyroidomeglay. CAROTIDS:  LUNGS: bilateral air entry equal but reduced   HEART: S1+S2 audible, irregularly irregular, no murmur, no pericardial rub. ABD: Non-tender, BS Audible    EXT: No edema, and no cyanosis. VASCULAR EXAM: Pulses are intact. PSYCHIATRIC EXAM: Mood is appropriate. MUSCULOSKELETAL: Grossly no joint deformity.   NEUROLOGICAL: Alert, awake, follows verbal commands   Review of Data:   LABS:   Lab Results   Component Value Date/Time    WBC 18.6 (H) 10/30/2019 03:05 PM    HGB 12.0 (L) 10/30/2019 03:05 PM    HCT 37.7 10/30/2019 03:05 PM    PLATELET 108 12/15/4661 03:05 PM     Lab Results   Component Value Date/Time    Sodium 137 10/30/2019 03:05 PM    Potassium 4.9 10/30/2019 03:05 PM    Chloride 103 10/30/2019 03:05 PM    CO2 30 10/30/2019 03:05 PM    Glucose 154 (H) 10/30/2019 03:05 PM    BUN 30 (H) 10/30/2019 03:05 PM    Creatinine 1.24 10/30/2019 03:05 PM     No results found for: CHOL, CHOLX, CHLST, CHOLV, HDL, HDLP, LDL, LDLC, DLDLP, TGLX, TRIGL, TRIGP  No results found for: GPT  Lab Results   Component Value Date/Time    Hemoglobin A1c 5.3 10/19/2019 01:46 PM         Cardiology Procedures:   Results for orders placed or performed during the hospital encounter of 10/30/19   EKG, 12 LEAD, INITIAL   Result Value Ref Range    Ventricular Rate 142 BPM Atrial Rate 357 BPM    QRS Duration 82 ms    Q-T Interval 294 ms    QTC Calculation (Bezet) 452 ms    Calculated R Axis 62 degrees    Calculated T Axis 89 degrees    Diagnosis       Atrial fibrillation with rapid ventricular response with premature   ventricular or aberrantly conducted complexes  Nonspecific ST T changes  Abnormal ECG               Impression / Plan:    Patient Active Problem List   Diagnosis Code    Hypertension I10    COPD (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 ([facility]) J44.9    Chronic renal disease, stage 3, moderately decreased glomerular filtration rate between 30-59 mL/min/1.73 square meter ([facility]) N18.3    Asbestosis ([facility] [address] J61    Acute exacerbation of chronic obstructive pulmonary disease (COPD) ([facility] [address] J44.1    Advanced care planning/counseling discussion Z71.89    Debility R53.81         Paroxysmal atrial fibrillation ([facility]) I48.0    Chronic respiratory failure with hypoxia ([facility]) J96.11    COPD (chronic obstructive pulmonary disease) ([facility]) J44.9    Acute on chronic respiratory failure with  hypercapnia ([facility])     Atrial fibrillation with RVR ([facility]) I48.91     Hypercapnic respiratory failure from COPD exacerbation  Atrial fibrillation with rapid ventricular rate    Recent Echo done on 10/21/2019 reported LVEF 4655%    66year old gentleman brought to the emergency room with worsening of shortness of breath. He is being managed for COPD exacerbation. On ER arrival patient was in atrial fibrillation with rapid ventricular rate. He was given IV Cardizem ×2. Discussed with the ER physician. Advised to start Cardizem drip and titrate as per heart rate/blood pressure response. Start heparin drip and titrate as per aPTT. Avoid beta 2 agonist inhaler/Nebulizer  Continue BiPAP  Continue management as per hospital medicine and pulmonary critical care      42 minutes of critical care time spent in the direct evaluation and treatment of this high risk patient.  The reason for providing this level of medical care for this critically ill patient was due a critical illness that impaired one or more vital organ systems such that there was a high probability of imminent or life threatening deterioration in the patients condition. This care involved high complexity decision making to assess, manipulate, and support vital system functions, to treat this degree vital organ system failure and to prevent further life threatening deterioration of the patients condition.           Signed By: Anaid Lockwood MD     October 30, 2019

## 2019-10-31 NOTE — CDMP QUERY
Patient noted to have chronic renal disease stage 3 documented in consultants' progress notes. If possible, could you please document in the progress notes and d/c summary if you are evaluating and/or treating any of the following: 
 
=> CKD Stage 3 
=> CKD Stage:  
=> no CKD 
=> Other explanation of clinical findings 
=> Clinically undetermined (no explanation for clinical findings) The medical record reflects the following: 
 
-----> Risk Factors: 66 yr old male with multiple comorbidities 
 
-----> Clinical Indicators: * 10-30-19 Cardiology Consult:  \". Nick Abbasi Patient Active Problem List:. Nick Abbasi Chronic renal disease, stage 3. Nick Abbasi \" 
     * 10-31-19 Cardiology Consult:  \". Nick Abbasi Patient Active Problem List:. ... Chronic renal disease, stage 3. Nick Abbasi \" * Labs: GFR est non-AA: Aug 2019: 44, 48, 52, 56, 56;  Sept 2019: 55, 59, >60, >60;  Oct 2019: 58, 59, >60, 57, 52, ... -----> Treatment: labs; follow I/O; dewitt Thank you for your time, 
Shaniqua Sharma RN Clinical Documentation Improvement [phone] Reference:    CKD Stages / [employer] Stage 1:  GFR = > 90 ml/min Stage 2:  GFR = 60 to 89 Stage 3:  GFR = 30 to 59 Stage 4:  GFR = 15 to 29 Stage 5:  GFR = < 15

## 2019-10-31 NOTE — PROGRESS NOTES
Hospitalist Progress Note-critical care note     Patient: Nadir Dominguez MRN: [id number]  CSN: [id number]    YOB: 1945  Age: 66 y.o. Sex: male    DOA: 10/30/2019 LOS:  LOS: 1 day            Chief complaint: acute on chronic respiratory failure  , afib . htn ,copd     Assessment/Plan         Hospital Problems  Date Reviewed: 7/6/2019          Codes Class Noted POA    * (Principal) Acute on chronic respiratory failure with hypoxia and hypercapnia ([facility] ICD-10-CM: J96.21, J96.22  ICD-9-CM: 518.84, 786.09, 799.02  10/30/2019         Atrial fibrillation with RVR ([facility]) ICD-10-CM: I48.91  ICD-9-CM: 427.31  10/30/2019 Unknown        Acute exacerbation of chronic obstructive pulmonary disease (COPD) ([facility] ICD-10-CM: J44.1  ICD-9-CM: 491.21  2/8/2019 Yes        Asbestosis ([facility] ICD-10-CM: B04  ICD-9-CM: 290  10/19/2018 Yes        Hypertension ICD-10-CM: Z87  ICD-9-CM: 401.9  Unknown Yes              Acute on chronic respiratory failure with hypercapnia and hypoxia  Improving, off bipap   Continue BiPAP. Need to rule out PE, will have  CTA chest -try today   On heparin drip right now.     Acute COPD exacerbation  Improving   Continue IV steroid. Solu-Medrol breathing treatment,on  Levaquin     Asbestosis     A. fib with RVR-paf sr now   Heparin drip, off  Cardizem drip now   Recent echo done with normal EF  Dr. Ronny Davidson on board      Hypertension: will defer bbb vs ca bbb per cardiologist        Leukocytosis:   chest x-ray no acute issue, improving     Subjective: I want to eat , sob better     Daughter was at the bedside      Disposition :1-2 days   Review of systems:    General: No fevers or chills. Cardiovascular: No chest pain or pressure. No palpitations. Pulmonary: shortness of breath better   Gastrointestinal: No nausea, vomiting.      Vital signs/Intake and Output:  Visit Vitals  /87   Pulse 81   Temp 98.2 °F (36.8 °C)   Resp 18   Ht 6' (1.829 m)   Wt 91.2 kg (201 lb 1.6 oz)   SpO2 99%   BMI 27.27 kg/m²     Current Shift:  No intake/output data recorded. Last three shifts:  10/29 1901 - 10/31 0700  In: 481.9 [P.O.:100; I.V.:381.9]  Out: 775 [Urine:775]    Physical Exam:  General: WD, WN. Alert, cooperative, no acute distress    HEENT: NC, Atraumatic. PERRLA, anicteric sclerae. Lungs: Rales of left side   Heart:  Regular  rhythm,  No murmur, No Rubs, No Gallops  Abdomen: Soft, Non distended, Non tender.  +Bowel sounds,   Extremities: No c/c/e  Psych:   Not anxious or agitated. Neurologic:  No acute neurological deficit. Labs: Results:       Chemistry Recent Labs     10/31/19  0130 10/30/19  1505   * 154*    137   K 5.2 4.9    103   CO2 24 30   BUN 38* 30*   CREA 1.33* 1.24   CA 8.3* 8.1*   AGAP 11 4   BUCR 29* 24*   AP  --  80   TP  --  6.4   ALB  --  3.7   GLOB  --  2.7   AGRAT  --  1.4      CBC w/Diff Recent Labs     10/31/19  0130 10/30/19  1505   WBC 16.7* 18.6*   RBC 3.72* 4.06*   HGB 11.0* 12.0*   HCT 33.8* 37.7    390   GRANS 92* 55   LYMPH 3* 31   EOS 0 3      Cardiac Enzymes No results for input(s): CPK, CKND1, WILLARD in the last 72 hours. No lab exists for component: CKRMB, TROIP   Coagulation Recent Labs     10/31/19  0744 10/31/19  0130   APTT 98.7* 174.9*       Lipid Panel No results found for: CHOL, CHOLPOCT, CHOLX, CHLST, CHOLV, 375036, HDL, HDLP, LDL, LDLC, DLDLP, 054489, VLDLC, VLDL, TGLX, TRIGL, TRIGP, TGLPOCT, CHHD, CHHDX   BNP No results for input(s): BNPP in the last 72 hours.    Liver Enzymes Recent Labs     10/30/19  1505   TP 6.4   ALB 3.7   AP 80   SGOT 10      Thyroid Studies Lab Results   Component Value Date/Time    TSH 0.86 10/20/2019 01:05 AM        Procedures/imaging: see electronic medical records for all procedures/Xrays and details which were not copied into this note but were reviewed prior to creation of Lexus Tolentino MD

## 2019-10-31 NOTE — PROGRESS NOTES
Initial Assessment completed. AAOX4. Very hard of hearing & doesn't want to use the hearing aid @ present. No c/o of any pain. Cardizem drip d/c @ 0600. Monitor shows nsr w/out ectopy. Afebrile. On heparin drip , seemar for dosage & concentration. Npo @ present & will adressed to MD.    0900- Dr Hector Germain visited w/ new orders. Daughter @ bedside. 3642- To cat scan w/ Wilmar Plan RRT w/ transport via ICU bed. 1015- Back from cat scan. 1000- VS stable. No sob. Monitor no changed. Dr Yaquelin Quevedo visited w/out new order. Left ac dressing change oozing blood. Heparin adjusted per protocol. 1400-Urine output fair amount. 1600- Assessment uchanged. VS stable. No c/o of any pain. Monitor no changed. Afebrile. 1800- Condition no changed during the shift. No sob or any c/o of any pain. 1915-Bedside and Verbal shift change report given to Joan Glynn (oncoming nurse) by  Delfino Le RN (offgoing nurse). Report included the following information SBAR, Kardex, ED Summary, Intake/Output, MAR, Recent Results and Cardiac Rhythm nsr.

## 2019-10-31 NOTE — PROGRESS NOTES
2320 - Bedside and Verbal shift change report received from Roque Worrell RN (offgoing nurse). Report included the following information SBAR, Kardex, ED Summary, Procedure Summary, Intake/Output, MAR, Recent Results, Med Rec Status and Cardiac Rhythm NSR. Assumed care of patient. Heparin drip rate verified. 0030 - Shift assessment completed at this time. Patient alert and oriented, no needs/complaints. . Breath sounds noted to have expiratory wheezing, no complaints of SOB at this time. 0400 - Reassessment completed, no changes to previous assessment. Urine specimen obtained for UA and drug screen. 0550 - .     0715 - Bedside and Verbal shift change report given to Marquise Ascencio RN (oncoming nurse) by Siva Izaguirre RN (offgoing nurse). Report included the following information SBAR, Kardex, ED Summary, Procedure Summary, Intake/Output, MAR, Recent Results, Med Rec Status and Cardiac Rhythm NSR.

## 2019-10-31 NOTE — PROGRESS NOTES
Problem: Afib Pathway: Day 1  Goal: Consults, if ordered  Note:   Dr. Quin Holly consulted for new onset a fib RVR    Goal: Diagnostic Test/Procedures  Outcome: Progressing Towards Goal  Goal: Medications  Outcome: Progressing Towards Goal  Goal: Respiratory  Outcome: Progressing Towards Goal  Note:   RT managing BIPAP  Goal: *Stable cardiac rhythm  Outcome: Progressing Towards Goal  Note:   NSR is shown on monitor at this time. Cardizem gtt infusing.    Goal: *Lungs clear or at baseline  Outcome: Not Progressing Towards Goal  Note:   Bilateral bases - wheezing  Goal: *Describes available resources and support systems  Outcome: Progressing Towards Goal

## 2019-11-01 ENCOUNTER — HOME CARE VISIT (OUTPATIENT)
Dept: HOME HEALTH SERVICES | Facility: HOME HEALTH | Age: 74
End: 2019-11-01
Payer: MEDICARE

## 2019-11-01 LAB
ANION GAP SERPL CALC-SCNC: 8 MMOL/L (ref 3–18)
APTT PPP: 110 SEC (ref 23–36.4)
BASOPHILS # BLD: 0 K/UL (ref 0–0.1)
BASOPHILS NFR BLD: 0 % (ref 0–3)
BUN SERPL-MCNC: 45 MG/DL (ref 7–18)
BUN/CREAT SERPL: 38 (ref 12–20)
CALCIUM SERPL-MCNC: 8.4 MG/DL (ref 8.5–10.1)
CHLORIDE SERPL-SCNC: 102 MMOL/L (ref 100–111)
CO2 SERPL-SCNC: 26 MMOL/L (ref 21–32)
CREAT SERPL-MCNC: 1.18 MG/DL (ref 0.6–1.3)
DIFFERENTIAL METHOD BLD: ABNORMAL
EOSINOPHIL # BLD: 0 K/UL (ref 0–0.4)
EOSINOPHIL NFR BLD: 0 % (ref 0–5)
ERYTHROCYTE [DISTWIDTH] IN BLOOD BY AUTOMATED COUNT: 14.1 % (ref 11.6–14.5)
GLUCOSE BLD STRIP.AUTO-MCNC: 116 MG/DL (ref 70–110)
GLUCOSE BLD STRIP.AUTO-MCNC: 126 MG/DL (ref 70–110)
GLUCOSE BLD STRIP.AUTO-MCNC: 137 MG/DL (ref 70–110)
GLUCOSE BLD STRIP.AUTO-MCNC: 141 MG/DL (ref 70–110)
GLUCOSE BLD STRIP.AUTO-MCNC: 162 MG/DL (ref 70–110)
GLUCOSE SERPL-MCNC: 144 MG/DL (ref 74–99)
HCT VFR BLD AUTO: 31.3 % (ref 36–48)
HGB BLD-MCNC: 10.3 G/DL (ref 13–16)
LYMPHOCYTES # BLD: 0.3 K/UL (ref 0.8–3.5)
LYMPHOCYTES NFR BLD: 2 % (ref 20–51)
MAGNESIUM SERPL-MCNC: 2 MG/DL (ref 1.6–2.6)
MCH RBC QN AUTO: 29.5 PG (ref 24–34)
MCHC RBC AUTO-ENTMCNC: 32.9 G/DL (ref 31–37)
MCV RBC AUTO: 89.7 FL (ref 74–97)
MONOCYTES # BLD: 0.2 K/UL (ref 0–1)
MONOCYTES NFR BLD: 1 % (ref 2–9)
NEUTS BAND NFR BLD MANUAL: 1 % (ref 0–5)
NEUTS SEG # BLD: 16.1 K/UL (ref 1.8–8)
NEUTS SEG NFR BLD: 96 % (ref 42–75)
PLATELET # BLD AUTO: 231 K/UL (ref 135–420)
PMV BLD AUTO: 8.7 FL (ref 9.2–11.8)
POTASSIUM SERPL-SCNC: 4.2 MMOL/L (ref 3.5–5.5)
RBC # BLD AUTO: 3.49 M/UL (ref 4.7–5.5)
RBC MORPH BLD: ABNORMAL
SODIUM SERPL-SCNC: 136 MMOL/L (ref 136–145)
WBC # BLD AUTO: 16.8 K/UL (ref 4.6–13.2)
WBC MORPH BLD: ABNORMAL

## 2019-11-01 PROCEDURE — 77030013140 HC MSK NEB VYRM -A

## 2019-11-01 PROCEDURE — 74011250637 HC RX REV CODE- 250/637: Performed by: HOSPITALIST

## 2019-11-01 PROCEDURE — 76450000000

## 2019-11-01 PROCEDURE — 3331090001 HH PPS REVENUE CREDIT

## 2019-11-01 PROCEDURE — 97116 GAIT TRAINING THERAPY: CPT

## 2019-11-01 PROCEDURE — 74011636637 HC RX REV CODE- 636/637: Performed by: HOSPITALIST

## 2019-11-01 PROCEDURE — 36415 COLL VENOUS BLD VENIPUNCTURE: CPT

## 2019-11-01 PROCEDURE — 3331090002 HH PPS REVENUE DEBIT

## 2019-11-01 PROCEDURE — 77010033678 HC OXYGEN DAILY

## 2019-11-01 PROCEDURE — 74011250636 HC RX REV CODE- 250/636: Performed by: HOSPITALIST

## 2019-11-01 PROCEDURE — 74011250636 HC RX REV CODE- 250/636: Performed by: EMERGENCY MEDICINE

## 2019-11-01 PROCEDURE — 65660000000 HC RM CCU STEPDOWN

## 2019-11-01 PROCEDURE — 85730 THROMBOPLASTIN TIME PARTIAL: CPT

## 2019-11-01 PROCEDURE — 85025 COMPLETE CBC W/AUTO DIFF WBC: CPT

## 2019-11-01 PROCEDURE — 80048 BASIC METABOLIC PNL TOTAL CA: CPT

## 2019-11-01 PROCEDURE — 97162 PT EVAL MOD COMPLEX 30 MIN: CPT

## 2019-11-01 PROCEDURE — 83735 ASSAY OF MAGNESIUM: CPT

## 2019-11-01 PROCEDURE — 82962 GLUCOSE BLOOD TEST: CPT

## 2019-11-01 PROCEDURE — 94640 AIRWAY INHALATION TREATMENT: CPT

## 2019-11-01 PROCEDURE — 74011250637 HC RX REV CODE- 250/637: Performed by: INTERNAL MEDICINE

## 2019-11-01 PROCEDURE — 74011250636 HC RX REV CODE- 250/636: Performed by: INTERNAL MEDICINE

## 2019-11-01 PROCEDURE — 74011000250 HC RX REV CODE- 250: Performed by: HOSPITALIST

## 2019-11-01 RX ORDER — INSULIN LISPRO 100 [IU]/ML
INJECTION, SOLUTION INTRAVENOUS; SUBCUTANEOUS
Status: DISCONTINUED | OUTPATIENT
Start: 2019-11-01 | End: 2019-11-02 | Stop reason: HOSPADM

## 2019-11-01 RX ADMIN — DILTIAZEM HYDROCHLORIDE 30 MG: 30 TABLET, FILM COATED ORAL at 10:47

## 2019-11-01 RX ADMIN — HEPARIN SODIUM 13 UNITS/KG/HR: 10000 INJECTION, SOLUTION INTRAVENOUS at 14:39

## 2019-11-01 RX ADMIN — DILTIAZEM HYDROCHLORIDE 30 MG: 30 TABLET, FILM COATED ORAL at 17:35

## 2019-11-01 RX ADMIN — ARFORMOTEROL TARTRATE 15 MCG: 15 SOLUTION RESPIRATORY (INHALATION) at 07:24

## 2019-11-01 RX ADMIN — BUDESONIDE 500 MCG: 0.5 INHALANT RESPIRATORY (INHALATION) at 07:24

## 2019-11-01 RX ADMIN — LEVOFLOXACIN 500 MG: 5 INJECTION, SOLUTION INTRAVENOUS at 17:36

## 2019-11-01 RX ADMIN — BUDESONIDE 500 MCG: 0.5 INHALANT RESPIRATORY (INHALATION) at 19:46

## 2019-11-01 RX ADMIN — ARFORMOTEROL TARTRATE 15 MCG: 15 SOLUTION RESPIRATORY (INHALATION) at 19:46

## 2019-11-01 RX ADMIN — INSULIN LISPRO 2 UNITS: 100 INJECTION, SOLUTION INTRAVENOUS; SUBCUTANEOUS at 00:11

## 2019-11-01 RX ADMIN — TAMSULOSIN HYDROCHLORIDE 0.4 MG: 0.4 CAPSULE ORAL at 17:35

## 2019-11-01 RX ADMIN — METHYLPREDNISOLONE SODIUM SUCCINATE 40 MG: 40 INJECTION, POWDER, FOR SOLUTION INTRAMUSCULAR; INTRAVENOUS at 10:47

## 2019-11-01 NOTE — PROGRESS NOTES
TRANSFER - OUT REPORT:    Verbal report given to Anahi Javier RN on Armida Kramer  being transferred to [facility] for routine progression of care       Report consisted of patients Situation, Background, Assessment and   Recommendations(SBAR). Information from the following report(s) SBAR, Kardex, Med Rec Status and Cardiac Rhythm NSR was reviewed with the receiving nurse. Lines:   Peripheral IV 10/30/19 Right Antecubital (Active)   Site Assessment Clean, dry, & intact 11/1/2019  8:30 AM   Phlebitis Assessment 0 11/1/2019  8:30 AM   Infiltration Assessment 0 11/1/2019  8:30 AM   Dressing Status Intact 11/1/2019  8:30 AM   Dressing Type Transparent;Tape 11/1/2019  8:30 AM   Hub Color/Line Status Green;Flushed;Capped 11/1/2019  8:30 AM   Action Taken Open ports on tubing capped 11/1/2019  8:30 AM   Alcohol Cap Used Yes 11/1/2019  8:30 AM       Peripheral IV 10/30/19 Left Wrist (Active)   Site Assessment Clean, dry, & intact 11/1/2019  8:30 AM   Phlebitis Assessment 0 11/1/2019  8:30 AM   Infiltration Assessment 0 11/1/2019  8:30 AM   Dressing Status Clean, dry, & intact 11/1/2019  8:30 AM   Dressing Type Transparent;Tape 11/1/2019  8:30 AM   Hub Color/Line Status Blue; Infusing 11/1/2019  8:30 AM   Action Taken Open ports on tubing capped 11/1/2019  8:30 AM   Alcohol Cap Used Yes 11/1/2019  8:30 AM        Opportunity for questions and clarification was provided.       Patient transported with:   Monitor  O2 @ 2 liters

## 2019-11-01 NOTE — CONSULTS
Palliative Medicine Consult    Patient Name: Giacomo Hebert  YOB: 1945    Date of Initial Consult: 11/1/2019  Reason for Consult: Goals of care discussion  Requesting Provider: Garrison Strauss MD   Primary Care Physician: Tomy Zamorano MD      SUMMARY:   Giacomo Hebert is a 66 y.o. with a past history of chronic respiratory failure, atrial fibrillation, HTN, COPD, CKD stage 3, prostate cancer,  who was admitted on 10/30/2019 from home with a diagnosis of acute on chronic respiratory failure and COPD exacerbation. Current medical issues leading to Palliative Medicine involvement include: goals of care discussion for an 66year old gentleman with multiple co morbidities. PALLIATIVE DIAGNOSES:   1. Goals of care discussions  2. Acute on chronic respiratory failure  3. COPD   4. Debility       PLAN:   1. Goals of care discussions: Palliative medicine team including JASWANT Mcdonald RN and I met with patient at patient's bedside. Patient is alert and oriented x 4, hard of hearing. No family at bedside. Patient states his breathing has improved and denies pain. Introduced our team as palliative and discussed goals of care moving forward. Discussed the benefits and burdens of CPR in the event of cardiac or pulmonary arrest- patient would want attempts at resuscitation, with ventilator support for up to a 2 week trial but would not be accepting of a tracheostomy. Patient states that if his condition warrants a tracheostomy, he would want to be \"allowed death to be with the good Lord. \" Discussed the benefits and burdens of feeding tubes in the event of dysphagia or nutritional need, and patient would be accepting of a feeding tube for a defined trial period but not long-term. Called wife with patient's permission and notified of patient transfer to telemetry and plans to visit this afternoon for POST form completion.  Wife Joshua Fernández and grand daughter Curly Baker arrived and we readdressed goals of care with wife present, and patient signed POST form with the following measures: Attempt resuscitation, full interventions (ventilation for up to 2 weeks, no tracheostomy), and feeding tube for a defined trial. Wife supports patient's decisions. Support offered to patient and family. Valencia Casper provided for patient comfort. Will continue to follow for support. 2. Acute on chronic respiratory failure: History of COPD, asbestosis. On home oxygen at 2 lpm via NC. Baseline dyspnea with exertion. Came in for significant sudden onset of dyspnea with rest, wheezing and coughing. CTA negative for PE. No pneumonia per chest x-ray. Initially this admission he required BIPAP but has not needed last night or today. On nebs, solu-Medrol. Also on ABT for number 3. Treatment per pulmonary. 3. COPD:  Acute exacerbation, improving. Able to talk in complete sentences. Baseline respiratory failure on 2 lpm via NC at home. Quit smoking 8/2019.  4. Debility: Lives at home with wife. Ambulates short distances with walker x 1 month-relates this to arthritic pain. Needs some assist with ADLs. No recent falls per patient. 5. Initial consult note routed to primary continuity provider  6. Communicated plan of care with: Palliative IDT, patient, family.        GOALS OF CARE / TREATMENT PREFERENCES:   [====Goals of Care====]  GOALS OF CARE: Attempt resuscitation, full interventions (ventilation for up to 2 weeks, no tracheostomy), and feeding tube for a defined trial  Patient/Health Care Proxy Stated Goals: Prolong life      TREATMENT PREFERENCES:   Code Status: Full Code    Advance Care Planning:  Advance Care Planning 10/30/2019   Patient's Healthcare Decision Maker is: -   Primary Decision Maker Name -   Primary Decision Maker Phone Number -   Primary Decision Maker Relationship to Patient -   Confirm Advance Directive Yes, on file   Patient Would Like to Complete Advance Directive -       Medical Interventions: Full interventions(Ventilation trial period for up to 2 weeks, NO tracheostomy)    Artificially Administered Nutrition: Feeding tube for a defined trial period      The palliative care team has discussed with patient / health care proxy about goals of care / treatment preferences for patient.  [====Goals of Care====]         HISTORY:     History obtained from: patient, chart, family    CHIEF COMPLAINT: breathing improved    HPI/SUBJECTIVE:    The patient is:   [x] Verbal and participatory  [] Non-participatory due to:   Pleasant, alert and oriented x 4, hard of hearing     Clinical Pain Assessment (nonverbal scale for severity on nonverbal patients):   Clinical Pain Assessment  Severity: 0            FUNCTIONAL ASSESSMENT:     Palliative Performance Scale (PPS):  PPS: 60       PSYCHOSOCIAL/SPIRITUAL SCREENING:     Advance Care Planning:  Advance Care Planning 10/30/2019   Patient's Healthcare Decision Maker is: -   Primary Decision Maker Name -   Primary Decision Maker Phone Number -   Primary Decision Maker Relationship to Patient -   Confirm Advance Directive Yes, on file   Patient Would Like to Complete Advance Directive -        Any spiritual / Religion concerns:  [] Yes /  [x] No    Caregiver Burnout:  [] Yes /  [x] No /  [] No Caregiver Present      Anticipatory grief assessment:   [] Normal  / [x] Maladaptive               REVIEW OF SYSTEMS:     Positive and pertinent negative findings in ROS are noted above in HPI. The following systems were [x] reviewed / [] unable to be reviewed as noted in HPI  Other findings are noted below. Systems: constitutional, ears/nose/mouth/throat, respiratory, gastrointestinal, genitourinary, musculoskeletal, integumentary, neurologic, psychiatric, endocrine. Positive findings noted below.   Modified ESAS Completed by: provider           Pain: 0   Anxiety: 0 Nausea: 0     Dyspnea: 1     Constipation: No              PHYSICAL EXAM:     From RN flowsheet:  Wt Readings from Last 3 Encounters:   10/31/19 91.2 kg (201 lb 1 oz)   10/23/19 90.7 kg (200 lb)   10/22/19 92 kg (202 lb 13.2 oz)     Blood pressure 146/67, pulse 81, temperature 98.4 °F (36.9 °C), resp. rate 21, height 6' (1.829 m), weight 91.2 kg (201 lb 1 oz), SpO2 98 %. Pain Scale 1: Numeric (0 - 10)  Pain Intensity 1: 0                 Constitutional: Awake, alert, NAD  Eyes: pupils equal, anicteric  ENMT: no nasal discharge, moist mucous membranes, hard of hearing  Cardiovascular: distal pulses intact  Respiratory: breathing not labored, symmetric, on oxygen via NC  Musculoskeletal: no deformity, no tenderness to palpation  Skin: warm, dry  Neurologic: following commands, moving all extremities  Psychiatric: full affect, no hallucinations       HISTORY:     Principal Problem:    Acute on chronic respiratory failure with hypoxia and hypercapnia (HCC) (10/30/2019)    Active Problems:    Hypertension ()      Asbestosis ([facility] [address] (10/19/2018)      Acute exacerbation of chronic obstructive pulmonary disease (COPD) ([facility] [address] (2/8/2019)      Atrial fibrillation with RVR ([facility] [address] (10/30/2019)      Past Medical History:   Diagnosis Date    Cancer ([facility] [address]     prostate    COPD (chronic obstructive pulmonary disease) ([facility] [address]     Hypertension     Nocturia     Pneumonia     Radiation effect       Past Surgical History:   Procedure Laterality Date    HX HERNIA REPAIR        Family History   Problem Relation Age of Onset    Heart Disease Mother       History reviewed, no pertinent family history.   Social History     Tobacco Use    Smoking status: Former Smoker     Types: Cigarettes    Smokeless tobacco: Never Used   Substance Use Topics    Alcohol use: No     Allergies   Allergen Reactions    Aspirin Other (comments)     \"messes my stomach up\"      Current Facility-Administered Medications   Medication Dose Route Frequency    insulin lispro (HUMALOG) injection   SubCUTAneous AC&HS    [START ON 11/2/2019] methylPREDNISolone (PF) (SOLU-MEDROL) injection 40 mg  40 mg IntraVENous Q24H    tamsulosin (FLOMAX) capsule 0.4 mg  0.4 mg Oral QPM    dilTIAZem (CARDIZEM) IR tablet 30 mg  30 mg Oral TIDAC    sodium chloride (NS) flush 5-10 mL  5-10 mL IntraVENous PRN    acetaminophen (TYLENOL) tablet 650 mg  650 mg Oral Q4H PRN    naloxone (NARCAN) injection 0.4 mg  0.4 mg IntraVENous PRN    diphenhydrAMINE (BENADRYL) injection 12.5 mg  12.5 mg IntraVENous Q4H PRN    ondansetron (ZOFRAN) injection 4 mg  4 mg IntraVENous Q4H PRN    nicotine (NICODERM CQ) 14 mg/24 hr patch 1 Patch  1 Patch TransDERmal Q24H    heparin 25,000 units in D5W 250 ml infusion  18-36 Units/kg/hr IntraVENous TITRATE    budesonide (PULMICORT) 500 mcg/2 ml nebulizer suspension  500 mcg Nebulization BID RT    arformoterol (BROVANA) neb solution 15 mcg  15 mcg Nebulization BID RT    levoFLOXacin (LEVAQUIN) 500 mg in D5W IVPB  500 mg IntraVENous Q24H    glucose chewable tablet 16 g  4 Tab Oral PRN    glucagon (GLUCAGEN) injection 1 mg  1 mg IntraMUSCular PRN    albuterol-ipratropium (DUO-NEB) 2.5 MG-0.5 MG/3 ML  3 mL Nebulization Q4H PRN          LAB AND IMAGING FINDINGS:     Lab Results   Component Value Date/Time    WBC 16.8 (H) 11/01/2019 04:40 AM    HGB 10.3 (L) 11/01/2019 04:40 AM    PLATELET 685 29/52/1586 04:40 AM     Lab Results   Component Value Date/Time    Sodium 136 11/01/2019 04:40 AM    Potassium 4.2 11/01/2019 04:40 AM    Chloride 102 11/01/2019 04:40 AM    CO2 26 11/01/2019 04:40 AM    BUN 45 (H) 11/01/2019 04:40 AM    Creatinine 1.18 11/01/2019 04:40 AM    Calcium 8.4 (L) 11/01/2019 04:40 AM    Magnesium 2.0 11/01/2019 04:40 AM    Phosphorus 4.2 10/21/2019 01:15 AM      Lab Results   Component Value Date/Time    AST (SGOT) 10 10/30/2019 03:05 PM    Alk.  phosphatase 80 10/30/2019 03:05 PM    Protein, total 6.4 10/30/2019 03:05 PM    Albumin 3.7 10/30/2019 03:05 PM    Globulin 2.7 10/30/2019 03:05 PM     Lab Results   Component Value Date/Time    INR 0.9 07/09/2019 05:20 AM    Prothrombin time 12.3 07/09/2019 05:20 AM    aPTT 110.0 (H) 11/01/2019 04:40 AM      No results found for: IRON, FE, TIBC, IBCT, PSAT, FERR   No results found for: PH, PCO2, PO2  No components found for: [place]   Lab Results   Component Value Date/Time    CK 94 09/06/2019 11:43 AM    CK - MB 4.4 (H) 09/06/2019 11:43 AM                Total time: 70 minutes  Counseling / coordination time, spent as noted above: 60 minutes  > 50% counseling / coordination?: yes, patient, family    Prolonged service was provided for  []30 min   []75 min in face to face time in the presence of the patient, spent as noted above. Time Start:   Time End:   Note: this can only be billed with 95889 (initial) or 13599 (follow up). If multiple start / stop times, list each separately.

## 2019-11-01 NOTE — PROGRESS NOTES
Pulmonary Specialists  Pulmonary, Critical Care, and Sleep Medicine    Name: Fer Wade MRN: [id number]   : 1945 Hospital: [facility] [place]    Date: 2019  Room: 102/01     [facility] Note                                              Consult requesting physician: Dr. Timothy Arana  Reason for Consult: CODP exacerbation, AFib with RVR      IMPRESSION:   · Acute on chronic hypercarbic and hypoxic respiratory failure. (J96.21, J96.22)  · AE-COPD (J44.1)  · A. fib with RVR. (I48.91)  ·   Patient Active Problem List   Diagnosis Code    Hypertension I10    COPD (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 ([facility]) J44.9    Chronic renal disease, stage 3, moderately decreased glomerular filtration rate between 30-59 mL/min/1.73 square meter ([facility]) N18.3    Asbestosis ([facility] J61    Acute exacerbation of chronic obstructive pulmonary disease (COPD) ([facility] J44.1    Advanced care planning/counseling discussion Z71.89    Debility R53.81    Pneumonia of right lower lobe due to methicillin susceptible Staphylococcus aureus (MSSA) ([facility] J15.211    Paroxysmal atrial fibrillation ([facility]) I48.0    Chronic respiratory failure with hypoxia ([facility]) J96.11    COPD (chronic obstructive pulmonary disease) ([facility]) J44.9    Acute on chronic respiratory failure with hypoxia and hypercapnia (HCC) J96.21, J96.22    Atrial fibrillation with RVR ([facility]) I48.91       · Code status: Full code      RECOMMENDATIONS:   Respiratory: Admitted with sudden onset of dyspnea associated with coughing and wheezing. CTA negative for PE. No pneumonia. Chronic pleural space calcification and collection which has been stable since 2014. Initially required BiPAP, now on nasal cannula home dose 2-3 LPM.  Respiratory status improved. Continue Brovana nebulizer twice daily. Continue Pulmicort nebulizer twice daily. Continue DuoNeb as needed. Continue Solu-Medrol, reduce dose to 40 mg every 24 hour.   Levaquin for mild COPD exacerbation. Keep SPO2 >=92%. HOB 30 degree elevation all the time. Aggressive pulmonary toileting. Aspiration precautions. Incentive spirometry. CVS: Chronic history of AZen brady, he is not on anticoagulation at home for some reason. Admitted with PAUL brady with RVR. Initially required Cardizem bolus followed by drip. Off Cardizem drip now. On heparin drip. Echo noted. Defer to Dr. Lety Lal. ID: Leukocytosis could be due to stress and possible bronchitis. Continue Levaquin. Not producing sputum for collection/culture. Blood culture NGTD. Follow cultures. Deescalate antibiotic when appropriate. Hematology/Oncology: Chronic anemia. No acute bleeding. Renal: Hx CKD. Monitor renal function. Stable currently. GI/: No acute issues. Endocrine: Monitor BS. SSI. Monitor blood sugar closely while on high-dose steroid. Neurology: No acute issues  Toxicology: Drug screen negative  Pain/Sedation: No acute issues  Skin/Wound: No acute issues  Electrolytes: Replace electrolytes per ICU electrolyte replacement protocol. IVF: None  Nutrition: PO  Prophylaxis: DVT Prophylaxis: SCD/heparin. GI Prophylaxis: Low risk for stress ulcer. Restraints: none  Lines/Tubes: PIV    Will defer respective systems problem management to primary and other respective consultant and follow patient in ICU with primary and other medical team.  Further recommendations will be based on the patient's response to recommended treatment and results of the investigation ordered. Quality Care: PPI, DVT prophylaxis, HOB elevated, Infection control all reviewed and addressed. Care of plan d/w RN, RT, MDR.  D/w patient, family-daughter (answered all questions to satisfaction). High complexity decision making was performed during the evaluation of this patient at high risk for decompensation with multiple organ involvement. Total critical care time spent rendering care exclusive of procedures: 32 minutes.     Transfer to tele. Once transferred, [facility] will sign off. Call us with any questions. Subjective/History of Present Illness:     Patient is a 66 y.o. male with PMHx significant for HTN, CKD, paroxysmal A. 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 fib with RVR. Due to recurrent pulmonary infection, ICS was discontinued during last hospitalization 08/2019 and he was started on chronic azithromycin to 50 mg Monday Wednesday and Friday. He supposed to be on Anoro Ellipta at home. Being followed by Dr. Betty Pak, pulmonary in office. Patient canceled last office follow-up as he could not come to office. Complaint of sudden onset of dyspnea started 10/30/2019, with associated wheezing, dry cough without sputum production or hemoptysis. Denies leg edema or PND. No lightheadedness, dizziness, palpitation. Found to have A. fib with RVR, started on Cardizem and admitted. He is not on any anticoagulation at home. 11/1/2019:  Remains in ICU  Admitted to ICU with A. fib with RVR and COPD exacerbation. Off cardizem drip. On heparin drip  respi status improved  No cough sputum production fever chills cp leg edema palpitation  Dyspnea improved  No wheezing  On NC 2-3 LPM  AFib rate controlled  No other overnight issues      I/O last 24 hrs: Intake/Output Summary (Last 24 hours) at 11/1/2019 1230  Last data filed at 11/1/2019 1200  Gross per 24 hour   Intake 572.84 ml   Output 3000 ml   Net -2427.16 ml         The patient is critically ill.   History taken from patient, EMR     Review of Systems:   HEENT: No epistaxis, no nasal drainage, no difficulty in swallowing, no redness in eyes  Respiratory: as above  Cardiovascular: no chest pain, no palpitations, no chronic leg edema, no syncope  Gastrointestinal: no abd pain, no vomiting, no diarrhea, no bleeding symptoms  Genitourinary: No urinary symptoms or hematuria  Musculoskeletal: Neg  Neurological: No focal weakness, no seizures, no headaches  Behvioral/Psych: No anxiety, no depression  Constitutional: No fever, no chills, no weight loss, no night sweats     Allergies   Allergen Reactions    Aspirin Other (comments)     \"messes my stomach up\"      Past Medical History:   Diagnosis Date    Cancer ([facility]     prostate    COPD (chronic obstructive pulmonary disease) ([facility]     Hypertension     Nocturia     Pneumonia     Radiation effect       Past Surgical History:   Procedure Laterality Date    HX HERNIA REPAIR        Social History     Tobacco Use    Smoking status: Former Smoker     Types: Cigarettes    Smokeless tobacco: Never Used   Substance Use Topics    Alcohol use: No      Family History   Problem Relation Age of Onset    Heart Disease Mother       Prior to Admission medications    Medication Sig Start Date End Date Taking? Authorizing Provider   ibuprofen (ADVIL) 100 mg tablet Take 100 mg by mouth every six (6) hours as needed for Pain. Yes Provider, Historical   acetaminophen (TYLENOL) 325 mg tablet Take 500 mg by mouth every four (4) hours as needed for Pain. Yes Provider, Historical   predniSONE (DELTASONE) 10 mg tablet Prednisone 10mg tabs: p.o.  4 tabs daily for 2 days then drop to   3 tabs daily for 2 days then drop to   2 tabs daily for 2 days then drop to   1 tab daily for 2 days then stop. Dispense 20 tabs 10/22/19  Yes Luc Ramos MD   OXYGEN-AIR DELIVERY SYSTEMS Take 2 L by inhalation continuous. Yes Provider, Historical   fluticasone propionate (FLONASE) 50 mcg/actuation nasal spray 2 Sprays by Both Nostrils route daily. Yes Provider, Historical   dextran 70/hypromellose (ARTIFICIAL TEARS, PF, OP) Apply 2 Drops to eye as needed for Other (both eyes for dryness).    Yes Provider, Historical diphenhydrAMINE (BENADRYL) 25 mg capsule Take 25 mg by mouth nightly as needed for Itching. Yes Provider, Historical   albuterol-ipratropium (DUO-NEB) 2.5 mg-0.5 mg/3 ml nebu 3 mL by Nebulization route every four (4) hours as needed. Patient taking differently: 3 mL by Nebulization route every four (4) hours as needed for Other (Shortness of breath). 2/9/19  Yes Awa Abel,    albuterol (PROVENTIL HFA, VENTOLIN HFA, PROAIR HFA) 90 mcg/actuation inhaler Take 2 Puffs by inhalation every four (4) hours as needed for Wheezing or Shortness of Breath. 4/23/18  Yes Lilo Ramirez PA-C   ipratropium (ATROVENT) 0.03 % nasal spray 2 Sprays by Both Nostrils route every twelve (12) hours as needed for Rhinitis. Yes Shant, MD Blayne   tamsulosin (FLOMAX) 0.4 mg capsule Take 0.4 mg by mouth every evening. Yes Shant, MD Blayne   chlorthalidone (HYGROTEN) 25 mg tablet Take  by mouth daily.    Yes Shant, MD Blayne     Current Facility-Administered Medications   Medication Dose Route Frequency    insulin lispro (HUMALOG) injection   SubCUTAneous AC&HS    tamsulosin (FLOMAX) capsule 0.4 mg  0.4 mg Oral QPM    methylPREDNISolone (PF) (Solu-MEDROL) injection 40 mg  40 mg IntraVENous Q8H    dilTIAZem (CARDIZEM) IR tablet 30 mg  30 mg Oral TIDAC    nicotine (NICODERM CQ) 14 mg/24 hr patch 1 Patch  1 Patch TransDERmal Q24H    heparin 25,000 units in D5W 250 ml infusion  18-36 Units/kg/hr IntraVENous TITRATE    budesonide (PULMICORT) 500 mcg/2 ml nebulizer suspension  500 mcg Nebulization BID RT    arformoterol (BROVANA) neb solution 15 mcg  15 mcg Nebulization BID RT    levoFLOXacin (LEVAQUIN) 500 mg in D5W IVPB  500 mg IntraVENous Q24H         Objective:   Vital Signs:    Visit Vitals  BP (P) 146/67 (BP 1 Location: Left arm, BP Patient Position: At rest)   Pulse 81   Temp 98.4 °F (36.9 °C)   Resp 21   Ht 6' (1.829 m)   Wt 91.2 kg (201 lb 1 oz)   SpO2 98%   BMI 27.27 kg/m²       O2 Device: (P) Nasal cannula   O2 Flow Rate (L/min): (P) 2 l/min   Temp (24hrs), Av.3 °F (36.8 °C), Min:98 °F (36.7 °C), Max:98.6 °F (37 °C)       Intake/Output:   Last shift:      701 - 1900  In: 369.8 [P.O.:240; I.V.:129.8]  Out: 300 [Urine:300]    Last 3 shifts: 10/30 1901 - 700  In: 223 [P.O.:100; I.V.:585]  Out: 2496 [Urine:4025]      Intake/Output Summary (Last 24 hours) at 2019 1230  Last data filed at 2019 1200  Gross per 24 hour   Intake 572.84 ml   Output 3000 ml   Net -2427.16 ml       Last 3 Recorded Weights in this Encounter    10/30/19 1504 10/31/19 1523   Weight: 91.2 kg (201 lb 1.6 oz) 91.2 kg (201 lb 1 oz)           Recent Labs     10/30/19  2004 10/30/19  1627 10/30/19  1523   PHI 7.375 7.242* 7.188*   PCO2I 43.5 63.9* 79.2*   PO2I 128* 105* 512*   HCO3I 25.6 27.5* 30.1*   FIO2I 0.3 50 100       Physical Exam:     General/Neurology: Alert, Awake, NAD. No accessory muscle use. Nasal cannula oxygen. Head:   Normocephalic, without obvious abnormality, atraumatic. Eye:   EOM intact, no scleral icterus, no pallor, no cyanosis. Throat:  Lips, mucosa, and tongue normal. No oral thrush. Neck:   Supple, symmetric. No lymphadenopathy. Trachea midline  Lung: Moderate air entry bilateral equal.  No crepitus/no rhonchi. No wheezing. No prolonged expiration. No accessory muscle use. Heart:   Irregular. S1 S2 present. No murmur. No JVD. Abdomen:  Soft. NT. ND. +BS. No masses. Extremities:  No pedal edema. No cyanosis. No clubbing. Pulses: 2+ and symmetric in DP. Capillary refill: normal  Lymphatic:  No cervical or supraclavicular palpable lymphadenopathy.        Data:       Recent Results (from the past 24 hour(s))   GLUCOSE, POC    Collection Time: 10/31/19  1:33 PM   Result Value Ref Range    Glucose (POC) 146 (H) 70 - 110 mg/dL   PTT    Collection Time: 10/31/19  5:12 PM   Result Value Ref Range    aPTT 106.4 (H) 23.0 - 36.4 SEC   GLUCOSE, POC    Collection Time: 10/31/19  6:26 PM   Result Value Ref Range    Glucose (POC) 161 (H) 70 - 110 mg/dL   GLUCOSE, POC    Collection Time: 11/01/19 12:05 AM   Result Value Ref Range    Glucose (POC) 162 (H) 70 - 272 mg/dL   METABOLIC PANEL, BASIC    Collection Time: 11/01/19  4:40 AM   Result Value Ref Range    Sodium 136 136 - 145 mmol/L    Potassium 4.2 3.5 - 5.5 mmol/L    Chloride 102 100 - 111 mmol/L    CO2 26 21 - 32 mmol/L    Anion gap 8 3.0 - 18 mmol/L    Glucose 144 (H) 74 - 99 mg/dL    BUN 45 (H) 7.0 - 18 MG/DL    Creatinine 1.18 0.6 - 1.3 MG/DL    BUN/Creatinine ratio 38 (H) 12 - 20      GFR est AA >60 >60 ml/min/1.73m2    GFR est non-AA >60 >60 ml/min/1.73m2    Calcium 8.4 (L) 8.5 - 10.1 MG/DL   MAGNESIUM    Collection Time: 11/01/19  4:40 AM   Result Value Ref Range    Magnesium 2.0 1.6 - 2.6 mg/dL   CBC WITH AUTOMATED DIFF    Collection Time: 11/01/19  4:40 AM   Result Value Ref Range    WBC 16.8 (H) 4.6 - 13.2 K/uL    RBC 3.49 (L) 4.70 - 5.50 M/uL    HGB 10.3 (L) 13.0 - 16.0 g/dL    HCT 31.3 (L) 36.0 - 48.0 %    MCV 89.7 74.0 - 97.0 FL    MCH 29.5 24.0 - 34.0 PG    MCHC 32.9 31.0 - 37.0 g/dL    RDW 14.1 11.6 - 14.5 %    PLATELET 768 560 - 195 K/uL    MPV 8.7 (L) 9.2 - 11.8 FL    NEUTROPHILS 96 (H) 42 - 75 %    BAND NEUTROPHILS 1 0 - 5 %    LYMPHOCYTES 2 (L) 20 - 51 %    MONOCYTES 1 (L) 2 - 9 %    EOSINOPHILS 0 0 - 5 %    BASOPHILS 0 0 - 3 %    ABS. NEUTROPHILS 16.1 (H) 1.8 - 8.0 K/UL    ABS. LYMPHOCYTES 0.3 (L) 0.8 - 3.5 K/UL    ABS. MONOCYTES 0.2 0 - 1.0 K/UL    ABS. EOSINOPHILS 0.0 0.0 - 0.4 K/UL    ABS.  BASOPHILS 0.0 0.0 - 0.1 K/UL    RBC COMMENTS SPHEROCYTES  FEW  HYPERSEGMENTED POLYS  FEW        WBC COMMENTS TOXIC GRANULATION      DF MANUAL     PTT    Collection Time: 11/01/19  4:40 AM   Result Value Ref Range    aPTT 110.0 (H) 23.0 - 36.4 SEC   GLUCOSE, POC    Collection Time: 11/01/19  6:58 AM   Result Value Ref Range    Glucose (POC) 141 (H) 70 - 110 mg/dL   GLUCOSE, POC    Collection Time: 11/01/19 11:52 AM   Result Value Ref Range Glucose (POC) 116 (H) 70 - 110 mg/dL         Chemistry Recent Labs     11/01/19  0440 10/31/19  0130 10/30/19  1505   * 153* 154*    137 137   K 4.2 5.2 4.9    102 103   CO2 26 24 30   BUN 45* 38* 30*   CREA 1.18 1.33* 1.24   CA 8.4* 8.3* 8.1*   MG 2.0 2.1  --    AGAP 8 11 4   BUCR 38* 29* 24*   AP  --   --  80   TP  --   --  6.4   ALB  --   --  3.7   GLOB  --   --  2.7   AGRAT  --   --  1.4        Lactic Acid Lactic acid   Date Value Ref Range Status   07/07/2019 1.4 0.4 - 2.0 MMOL/L Final     No results for input(s): LAC in the last 72 hours. Liver Enzymes Protein, total   Date Value Ref Range Status   10/30/2019 6.4 6.4 - 8.2 g/dL Final     Albumin   Date Value Ref Range Status   10/30/2019 3.7 3.4 - 5.0 g/dL Final     Globulin   Date Value Ref Range Status   10/30/2019 2.7 2.0 - 4.0 g/dL Final     A-G Ratio   Date Value Ref Range Status   10/30/2019 1.4 0.8 - 1.7   Final     AST (SGOT)   Date Value Ref Range Status   10/30/2019 10 10 - 38 U/L Final     Alk.  phosphatase   Date Value Ref Range Status   10/30/2019 80 45 - 117 U/L Final     Recent Labs     10/30/19  1505   TP 6.4   ALB 3.7   GLOB 2.7   AGRAT 1.4   SGOT 10   AP 80        CBC w/Diff Recent Labs     11/01/19  0440 10/31/19  0130 10/30/19  1505   WBC 16.8* 16.7* 18.6*   RBC 3.49* 3.72* 4.06*   HGB 10.3* 11.0* 12.0*   HCT 31.3* 33.8* 37.7    249 390   GRANS 96* 92* 55   LYMPH 2* 3* 31   EOS 0 0 3        Cardiac Enzymes No results found for: CPK, CK, CKMMB, CKMB, RCK3, CKMBT, CKNDX, CKND1, WILLARD, TROPT, TROIQ, DENNYS, TROPT, TNIPOC, BNP, BNPP     BNP No results found for: BNP, BNPP, XBNPT     Coagulation Recent Labs     11/01/19  0440 10/31/19  1712 10/31/19  1020   APTT 110.0* 106.4* 135.1*         Thyroid  Lab Results   Component Value Date/Time    TSH 0.86 10/20/2019 01:05 AM       No results found for: T4     Urinalysis Lab Results   Component Value Date/Time    Color YELLOW 10/31/2019 04:00 AM    Appearance CLEAR 10/31/2019 04:00 AM    Specific gravity 1.021 10/31/2019 04:00 AM    pH (UA) 5.5 10/31/2019 04:00 AM    Protein NEGATIVE  10/31/2019 04:00 AM    Glucose NEGATIVE  10/31/2019 04:00 AM    Ketone NEGATIVE  10/31/2019 04:00 AM    Bilirubin NEGATIVE  10/31/2019 04:00 AM    Urobilinogen 1.0 10/31/2019 04:00 AM    Nitrites NEGATIVE  10/31/2019 04:00 AM    Leukocyte Esterase NEGATIVE  10/31/2019 04:00 AM    Bacteria FEW (A) 09/08/2018 12:30 PM    WBC 0 to 3 09/08/2018 12:30 PM    RBC 0 to 3 09/08/2018 12:30 PM        Micro  Recent Labs     10/30/19  1520 10/30/19  1505   CULT NO GROWTH 2 DAYS NO GROWTH 2 DAYS     Recent Labs     10/30/19  1520 10/30/19  1505   CULT NO GROWTH 2 DAYS NO GROWTH 2 DAYS          Culture data during this hospitalization. All Micro Results     Procedure Component Value Units Date/Time    CULTURE, BLOOD [[id number]] Collected:  10/30/19 1505    Order Status:  Completed Specimen:  Blood Updated:  11/01/19 0706     Special Requests: NO SPECIAL REQUESTS        Culture result: NO GROWTH 2 DAYS       CULTURE, BLOOD [[id number]] Collected:  10/30/19 1520    Order Status:  Completed Specimen:  Blood Updated:  11/01/19 0706     Special Requests: NO SPECIAL REQUESTS        Culture result: NO GROWTH 2 DAYS                    ECHO 10/21/2019: · Left Ventricle: Normal cavity size and systolic function (ejection fraction normal). Mild concentric hypertrophy. Estimated left ventricular ejection fraction is 51 - 55%. Mild (grade 1) left ventricular diastolic dysfunction. · Aortic Valve: Probably trileaflet aortic valve. · IVC/Hepatic Veins: Moderately elevated central venous pressure (10-15 mmHg); IVC diameter is larger than 21 mm and collapses more than 50% with respiration.      Images report reviewed by me:  CT (Most Recent) (CT chest reviewed by me) Results from Hospital Encounter encounter on 10/30/19   CTA CHEST W OR W WO CONT    Narrative EXAM: CTA Chest    INDICATION: Acute on chronic respiratory failure with hypoxia and hypercapnia. Evaluation for potential embolism. COMPARISON: Correlation made with prior CT scan of the chest obtained July 7, 2019. Correlation made with multiple prior radiographs, most recent 10/30/2019    TECHNIQUE: Axial CT imaging from the thoracic inlet through the diaphragm with  intravenous contrast utilizing CTA study for pulmonary artery evaluation. Coronal and sagittal MIP reformations were generated at a separate workstation. One or more dose reduction techniques were used on this CT: automated exposure  control, adjustment of the mAs and/or kVp according to patient size, and  iterative reconstruction techniques. The specific techniques used on this CT  exam have been documented in the patient's electronic medical record. Digital  Imaging and Communications in Medicine (DICOM) format image data are available  to nonaffiliated external healthcare facilities or entities on a secure, media  free, reciprocally searchable basis with patient authorization for at least a  12-month period after this study. _______________    FINDINGS:    EXAM QUALITY: Overall exam quality is adequate. Pulmonary arterial enhancement  is adequate. The breath hold is satisfactory. PULMONARY ARTERIES: No convincing evidence of pulmonary embolism. MEDIASTINUM: Included thyroid gland contains several small hypodensities which  are too small to require further characterization. Great vessels are of normal  caliber. Thoracic aorta normal in course and caliber. Normal cardiac size. No  pericardial effusion. Small quantity of fluid within the superior pericardial  recess. LYMPH NODES: No enlarged mediastinal or hilar nodes by size criteria. AIRWAY: Central airways are patent. Small quantity of endobronchial secretions  noted at the distal right mainstem bronchus. No discrete foci of endobronchial  mucoid impaction.     LUNGS: There are basilar areas of atelectasis which involve the lower lobes  bilaterally. Significantly improved aeration of the left lower lobe noted in the  interval from prior CT scan. No significant groundglass abnormality or septal  line thickening. No alveolar consolidation. PLEURA: As previously, elliptical morphology peripherally calcified structures  are noted within the anterior aspect of the right pleural space with additional  posterior right hemithoracic peripherally calcified pleural space collection  redemonstrated. This appears unchanged from examination of March 19, 2017 and  demonstrates radiographic stability from 2014. No evidence of pneumothorax or  pleural effusion. UPPER ABDOMEN: Small hiatal hernia. Multiple punctate granulomata throughout the  liver. No acute abnormality present. .    OTHER: No acute or aggressive osseous abnormalities identified. _______________      Impression IMPRESSION:    1. No evidence of pulmonary embolism. 2.  Basilar areas of atelectasis without findings to suggest pneumonia,  pulmonary edema, or pleural effusion. 3. 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 4. Small hiatal hernia. CXR reviewed by me:  XR (Most Recent). CXR  reviewed by me and compared with previous CXR Results from Hospital Encounter encounter on 10/30/19   XR CHEST PORT    Narrative EXAM: XR CHEST PORT    CLINICAL INDICATION/HISTORY: Respiratory distress  -Additional: None    COMPARISON: Several prior exams, most recently chest radiograph dated 10/20/2019    TECHNIQUE: Frontal view of the chest    _______________    FINDINGS:    HEART AND MEDIASTINUM: Normal cardiac size and mediastinal contours. LUNGS AND PLEURAL SPACES: Elevated right hemidiaphragm and volume loss in the  right hemithorax unchanged from prior examination. No focal pneumonic opacity.   No pneumothorax or pleural effusion. Partially peripherally calcified  pleural-based lesion projecting over the right chest wall unchanged from  multiple prior examinations. The linear opacities at each lung base are similar  in appearance to prior. Improved aeration of the right lung base from recent  radiographs. BONY THORAX AND SOFT TISSUES: No acute osseous abnormality    _______________      Impression IMPRESSION:      1. No acute radiographic cardiopulmonary abnormality. Unchanged basilar areas of  scarring or atelectasis.            Charlotte Hay MD  11/1/2019

## 2019-11-01 NOTE — PROGRESS NOTES
Transition of Care Plan:     Communication to Patient/Family:  Met with patient and family, and they are agreeable to the transition plan. SNF/Rehab Transition:  Patient has been accepted to [facility] at [address] [facility], [address] [place] [address] for SNF and meets criteria for admission. Patient will transported by med transport* and expected to leave before 3pm on Saturday or Sunday this weekend. Communication to SNF/Rehab:  Bedside RN,  has been notified to update the transition plan to the facility and call report [phone]. Discharge information has been updated on the AVS and communicated through [facility] or [facility]. Discharge instructions to be fax'd to facility at [phone]     Please include all hard scripts for controlled substances, med rec and dc summary, and AVS in packet. Please medicate for pain prior to dc if possible and needed to help offset delay when patient first arrives to facility. Reviewed and confirmed with facility, [facility] can manage the patient care needs for the following:     Igor Dejesus with (X) only those applicable:  Medication:  []Medications are available at the facility  []IV Antibiotics    []Controlled Substance - hard copies available sent. []Weekly Labs    Equipment:  []CPAP/BiPAP  []Wound Vacuum  []Humphreys or Urinary Device  []PICC/Central Line  []Nebulizer  []Ventilator    Treatment:  []Isolation (for MRSA, VRE, etc.)  []Surgical Drain Management  []Tracheostomy Care  []Dressing Changes  []Dialysis with transportation  []PEG Care  [x]Oxygen  []Daily Weights for Heart Failure    Dietary:  []Any diet limitations  []Tube Feedings   []Total Parenteral Management (TPN)    Financial Resources:  [x]Medicaid Application Completed    [x]UAI Completed and copy given to pt/family. [x]A screening has previously been completed.     []Level II Completed    [] Private pay individual who will not become   financially eligible for Medicaid within 6 months from admission to a [facility] facility. [] Individual refused to have screening conducted. [x]Medicaid Application Completed    []The screening denied because it was determined individual did not need/did not qualify for nursing facility level of care. [] Out of state residents seeking direct admission to a [address] facility. [] Individuals who are inpatients of an out of state hospital, or in state or out of state veterans/ hospital and seek direct admission to a [address] facility  [] Individuals who are pateints or residents of a state owned/operated facility that is licensed by [employer] of [employer] ([employer]) and seek direct admission to [address] [place] [address]  [] A screening not required for enrollment in [address] [address] [address] services as set out in [address] [facility] 30-  [] [facility]) staff shall perform screenings of the [facility] clients. Advanced Care Plan:  []Surrogate Decision Maker of Care  []POA  []Communicated Code Status and copy sent. Other:         Care Management Interventions  PCP Verified by CM: Yes  Mode of Transport at Discharge:  Other (see comment)(family)  Transition of Care Consult (CM Consult): Discharge Planning  Current Support Network: Lives with Spouse, Family Lives Nearby(daughter lives 40 min away)  Confirm Follow Up Transport: Family  Freedom of Choice Offered: Yes  Discharge Location  Discharge Placement: Home with home health(v SNF)

## 2019-11-01 NOTE — PROGRESS NOTES
Report rec'd from MEGHNA Simeon RN, assumed care of patient. Awake and watching TV, denies pain, assessment completed, see flowsheet. NAD VSS    0000 pt resting w/ eyes closed, NAD VSS    0600 Uneventful night, denies c/o.     0720 Bedside and Verbal shift change report given to HILDA Adkins RN (oncoming nurse) by Stephanie Ahn RN (offgoing nurse). Report included the following information Bedside and Verbal shift change report given to HILDA Adkins RN (oncoming nurse) by  Stephanie Mahajan (offgoing nurse). Report included the following information SBAR, OR Summary, Procedure Summary, Intake/Output, MAR, Recent Results, Med Rec Status and Cardiac Rhythm NSR.

## 2019-11-01 NOTE — PROGRESS NOTES
Hospitalist Progress Note-critical care note     Patient: Tammy Haynes MRN: [id number]  CSN: [id number]    YOB: 1945  Age: 66 y.o. Sex: male    DOA: 10/30/2019 LOS:  LOS: 2 days            Chief complaint: acute on chronic respiratory failure  , afib . htn ,copd     Assessment/Plan         Hospital Problems  Date Reviewed: 7/6/2019          Codes Class Noted POA    * (Principal) Acute on chronic respiratory failure with hypoxia and hypercapnia ([facility] ICD-10-CM: J96.21, J96.22  ICD-9-CM: 518.84, 786.09, 799.02  10/30/2019         Atrial fibrillation with RVR (HCC) ICD-10-CM: I48.91  ICD-9-CM: 427.31  10/30/2019 Unknown        Acute exacerbation of chronic obstructive pulmonary disease (COPD) ([facility] ICD-10-CM: J44.1  ICD-9-CM: 491.21  2/8/2019 Yes        Asbestosis ([facility] ICD-10-CM: A96  ICD-9-CM: 307  10/19/2018 Yes        Hypertension ICD-10-CM: E51  ICD-9-CM: 401.9  Unknown Yes              Acute on chronic respiratory failure with hypercapnia and hypoxia  Resolving. Not needing bipap last night   Continue BiPAP prn   cta chest no PE        Acute COPD exacerbation  Improving ,still wheezing   Continue IV steroid. Solu-Medrol breathing treatment,on  Levaquin     Asbestosis     A. fib with RVR-paf sr now   Heparin drip, on po cardizem   Recent echo done with normal EF  Dr. Merlin Hendrickson on board      Hypertension: on cardizem       Leukocytosis:   chest x-ray no acute issue, improving    Subjective: I got improving     rn : no acute issue     Can be transfer to  Tele if intensive is ok  Will have pt/ot     Disposition :1-2 days   Review of systems:    General: No fevers or chills. Cardiovascular: No chest pain or pressure. No palpitations. Pulmonary: shortness of breath better   Gastrointestinal: No nausea, vomiting.      Vital signs/Intake and Output:  Visit Vitals  /72   Pulse 68   Temp 98 °F (36.7 °C)   Resp 24   Ht 6' (1.829 m)   Wt 91.2 kg (201 lb 1 oz)   SpO2 99%   BMI 27.27 kg/m²     Current Shift:  No intake/output data recorded. Last three shifts:  10/30 1901 - 11/01 0700  In: 573 [P.O.:100; I.V.:473]  Out: 2825 [Urine:2825]    Physical Exam:  General: WD, WN. Alert, cooperative, no acute distress    HEENT: NC, Atraumatic. PERRLA, anicteric sclerae. Lungs: Some wheezing   Heart:  Regular  rhythm,  No murmur, No Rubs, No Gallops  Abdomen: Soft, Non distended, Non tender.  +Bowel sounds,   Extremities: No c/c/e  Psych:   Not anxious or agitated. Neurologic:  No acute neurological deficit. Labs: Results:       Chemistry Recent Labs     11/01/19  0440 10/31/19  0130 10/30/19  1505   * 153* 154*    137 137   K 4.2 5.2 4.9    102 103   CO2 26 24 30   BUN 45* 38* 30*   CREA 1.18 1.33* 1.24   CA 8.4* 8.3* 8.1*   AGAP 8 11 4   BUCR 38* 29* 24*   AP  --   --  80   TP  --   --  6.4   ALB  --   --  3.7   GLOB  --   --  2.7   AGRAT  --   --  1.4      CBC w/Diff Recent Labs     11/01/19  0440 10/31/19  0130 10/30/19  1505   WBC 16.8* 16.7* 18.6*   RBC 3.49* 3.72* 4.06*   HGB 10.3* 11.0* 12.0*   HCT 31.3* 33.8* 37.7    249 390   GRANS 96* 92* 55   LYMPH 2* 3* 31   EOS 0 0 3      Cardiac Enzymes No results for input(s): CPK, CKND1, WILLARD in the last 72 hours. No lab exists for component: CKRMB, TROIP   Coagulation Recent Labs     11/01/19  0440 10/31/19  1712   APTT 110.0* 106.4*       Lipid Panel No results found for: CHOL, CHOLPOCT, CHOLX, CHLST, CHOLV, 537120, HDL, HDLP, LDL, LDLC, DLDLP, 022448, VLDLC, VLDL, TGLX, TRIGL, TRIGP, TGLPOCT, CHHD, CHHDX   BNP No results for input(s): BNPP in the last 72 hours.    Liver Enzymes Recent Labs     10/30/19  1505   TP 6.4   ALB 3.7   AP 80   SGOT 10      Thyroid Studies Lab Results   Component Value Date/Time    TSH 0.86 10/20/2019 01:05 AM        Procedures/imaging: see electronic medical records for all procedures/Xrays and details which were not copied into this note but were reviewed prior to creation of Wava Shutters, MD

## 2019-11-01 NOTE — PROGRESS NOTES
Problem: Mobility Impaired (Adult and Pediatric)  Goal: *Acute Goals and Plan of Care (Insert Text)  Description  Physical Therapy Goals  Initiated 11/1/2019 and to be accomplished within 3-7 day(s)  1. Patient will move from supine to sit and sit to supine  in bed with supervision/set-up. 2.  Patient will transfer from bed to chair and chair to bed with supervision/set-up using the least restrictive device. 3.  Patient will perform sit to stand with supervision/set-up. 4.  Patient will ambulate with supervision/set-up for 150 feet with the least restrictive device. 5.  Patient will ascend/descend 3 stairs with handrail(s) with supervision/set-up. Note:   PHYSICAL THERAPY EVALUATION    Patient: Monika Silva (67 y.o. male)  Date: 11/1/2019  Primary Diagnosis: Atrial fibrillation with RVR ([facility]) [I48.91]  Respiratory failure ([facility] [J96.90]  Precautions:   Fall    ASSESSMENT :  Based on the objective data described below, the patient presents with lower extremity weakness, decreased gait quality and endurance, impaired bed mobility and transfers, and overall limitations in functional mobility. Pt on O2 via NC. Pt performed supine to sit with CGA, sit to stand with CGA. Patient ambulated 10 feet with RW, GB applied, CGA. Pt tolerated session well as evidenced by no c/o increased pain, lightheadedness or SOB. Vitals remained stable. BP did increase to 170/72 with amb, then decrease to 152/75 with return to supine. Patient would benefit from skilled inpatient physical therapy to address deficits, progress as tolerated to achieve long term goals and allow safe discharge. Patient will benefit from skilled intervention to address the above impairments. Patients rehabilitation potential is considered to be Good  Factors which may influence rehabilitation potential include:   ? None noted  ? Mental ability/status  ? Medical condition  ?          Home/family situation and support systems  ? Safety awareness  ? Pain tolerance/management  ? Other:      PLAN :  Recommendations and Planned Interventions:  ?           Bed Mobility Training             ? Neuromuscular Re-Education  ? Transfer Training                   ? Orthotic/Prosthetic Training  ? Gait Training                          ? Modalities  ? Therapeutic Exercises          ? Edema Management/Control  ? Therapeutic Activities            ? Patient and Family Training/Education  ? Other (comment):    Frequency/Duration: Patient will be followed by physical therapy 1-2 times per day to address goals. Discharge Recommendations: Home Health vs [facility]  Further Equipment Recommendations for Discharge: N/A     SUBJECTIVE:   Patient stated It feels good to walk.     OBJECTIVE DATA SUMMARY:     Past Medical History:   Diagnosis Date    Cancer ([facility]     prostate    COPD (chronic obstructive pulmonary disease) ([facility]     Hypertension     Nocturia     Pneumonia     Radiation effect      Past Surgical History:   Procedure Laterality Date    HX HERNIA REPAIR       Barriers to Learning/Limitations: None  Compensate with: N/A  Prior Level of Function/Home Situation: Independent amb c/RW  Home Situation  Home Environment: Private residence  One/Two Story Residence: One story  Living Alone: No  Support Systems: Spouse/Significant Other/Partner  Patient Expects to be Discharged to[de-identified] Private residence  Current DME Used/Available at Home: [employer]  Critical Behavior:  Neurologic State: Alert  Orientation Level: Oriented X4  Cognition: Appropriate decision making; Appropriate for age attention/concentration; Appropriate safety awareness; Follows commands   Psychosocial  Purposeful Interaction: Yes  Pt Identified Daily Priority: Clinical issues (comment)  Caritas Process: Establish trust;Teaching/learning; Attend basic human needs;Create healing environment  Caring Interventions: Reassure; Therapeutic modalities  Reassure: Therapeutic listening; Informing  Therapeutic Modalities: Deep breathing;Humor  Skin Condition/Temp: Dry;Flaky; Warm   Skin Integrity: Tear(Left hand)  Skin Integumentary  Skin Color: Appropriate for ethnicity  Skin Condition/Temp: Dry;Flaky; Warm  Skin Integrity: Tear(Left hand)  Turgor: Non-tenting  Hair Growth: Present  Varicosities: Absent   Strength:    Strength: Generally decreased, functional  Tone & Sensation:   Tone: Normal  Sensation: Impaired  Range Of Motion:  AROM: Generally decreased, functional  Functional Mobility:  Bed Mobility:  Supine to Sit: Contact guard assistance  Sit to Supine: Contact guard assistance  Transfers:  Sit to Stand: Contact guard assistance  Stand to Sit: Contact guard assistance  Balance:   Sitting: Intact  Standing: Intact; With support  Ambulation/Gait Training:  Distance (ft): 10 Feet (ft)  Assistive Device: Gait belt;Walker, rolling  Ambulation - Level of Assistance: Contact guard assistance;Minimal assistance  Gait Description (WDL): Exceptions to WDL  Gait Abnormalities: Decreased step clearance; Step to gait  Base of Support: Widened  Stance: Weight shift  Speed/Lisbet: Slow  Step Length: Right shortened;Left shortened  Pain:  Pain Scale 1: Numeric (0 - 10)  Pain Intensity 1: 0  Activity Tolerance:   Good  Please refer to the flowsheet for vital signs taken during this treatment. After treatment:   ?         Patient left in no apparent distress sitting up in chair  ? Patient left in no apparent distress in bed  ? Call bell left within reach  ? Nursing notified  ? Caregiver present  ? Bed alarm activated    COMMUNICATION/EDUCATION:   ?         Fall prevention education was provided and the patient/caregiver indicated understanding. ? Patient/family have participated as able in goal setting and plan of care.   ?         Patient/family agree to work toward stated goals and plan of care. ?         Patient understands intent and goals of therapy, but is neutral about his/her participation. ? Patient is unable to participate in goal setting and plan of care.     Thank you for this referral.  Haroldo Wood   Time Calculation: 23 mins   Eval Complexity: History: MEDIUM  Complexity : 1-2 comorbidities / personal factors will impact the outcome/ POC Exam:MEDIUM Complexity : 3 Standardized tests and measures addressing body structure, function, activity limitation and / or participation in recreation  Presentation: MEDIUM Complexity : Evolving with changing characteristics  Clinical Decision Making:Medium Complexity    Overall Complexity:MEDIUM

## 2019-11-01 NOTE — ACP (ADVANCE CARE PLANNING)
Advance Directive in EMR. Primary MPOA is wife, Jac Castillor [phone].     Secondary MPOA is daughter, Ernie Babb [phone].     Current goal of care is Full Code with full interventions. He states he would not want long term life support. He also stated he would not want a trach. POST document completed. Patient selected FULL INTERVENTIONS with ventilation for a trial period of 2 weeks. No Tracheostomy. Feeding Tube for short term for road to recovery. Wife present for discussion and states agreement with patient's wishes.

## 2019-11-01 NOTE — PROGRESS NOTES
Bedside shift change report received from [facility]. Report included the following information SBAR, Kardex, Intake/Output, MAR, Recent Results, Med Rec Status and Cardiac Rhythm NSR and plan of care. 0830: Shift assessment complete. See flowsheet.   1100: PT in working with patient

## 2019-11-01 NOTE — PROGRESS NOTES
Cardiology Progress Note        Patient: Liliana Rey        Sex: male          DOA: 10/30/2019  YOB: 1945      Age:  66 y.o.        LOS:  LOS: 1 day    Patient seen and examined, chart reviewed. Assessment/Plan     Patient Active Problem List   Diagnosis Code    Hypertension I10    COPD (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 ([facility]) J44.9    Chronic renal disease, stage 3, moderately decreased glomerular filtration rate between 30-59 mL/min/1.73 square meter ([facility]) N18.3    Asbestosis ([facility] J61    Acute exacerbation of chronic obstructive pulmonary disease (COPD) [facility] J44.1    Advanced care planning/counseling discussion Z71.89    Debility R53.81    Pneumonia of right lower lobe due to methicillin susceptible Staphylococcus aureus (MSSA) ([facility] J15.211    Paroxysmal atrial fibrillation ([facility]) I48.0    Chronic respiratory failure with hypoxia ([facility]) J96.11    COPD (chronic obstructive pulmonary disease) ([facility]) J44.9    Acute on chronic respiratory failure with hypoxia and hypercapnia ([facility]) J96.21, J96.22    Atrial fibrillation with RVR ([facility]) I48.91      Paroxysmal atrial fibrillation CHADsVASc score is 3.0  Currently patient is in sinus rhythm  Patient is off Bi PAP  Plan:    IV Cardizem was discontinued. Start Cardizem IR 30 mg by mouth 3 times a day  Continue IV heparin  Continue management as per hospital medicine  Continue management as per pulmonary critical care                Subjective:    cc: My breathing is better      REVIEW OF SYSTEMS:     General: No fevers or chills.   Cardiovascular: No chest pain,No palpitations, No orthopnea, No PND, No leg swelling, No claudication  Pulmonary: Positive dyspnea   Gastrointestinal: No nausea, vomiting, bleeding  Neurology: No Dizziness    Objective:      Visit Vitals  /72   Pulse 80   Temp 98.1 °F (36.7 °C)   Resp 18   Ht 6' (1.829 m)   Wt 91.2 kg (201 lb 1 oz)   SpO2 100%   BMI 27.27 kg/m²     Body mass index is 27.27 kg/m². Physical Exam:  General Appearance: Comfortable,  using accessory muscles of respiration. HEENT: TAMI. HEAD: Atraumatic  NECK: No JVD, no thyroidomeglay. CAROTIDS:  LUNGS: Clear bilaterally. HEART: S1+S2 audible, no murmur, no pericardial rub. ABD: Non-tender, BS Audible    EXT: No edema, and no cyanosis. VASCULAR EXAM: Pulses are intact. NEUROLOGICAL: AAO times 3, moves all 4 extremities.   Medication:  Current Facility-Administered Medications   Medication Dose Route Frequency    tamsulosin (FLOMAX) capsule 0.4 mg  0.4 mg Oral QPM    methylPREDNISolone (PF) (Solu-MEDROL) injection 40 mg  40 mg IntraVENous Q8H    [START ON 11/1/2019] dilTIAZem (CARDIZEM) IR tablet 30 mg  30 mg Oral TIDAC    sodium chloride (NS) flush 5-10 mL  5-10 mL IntraVENous PRN    acetaminophen (TYLENOL) tablet 650 mg  650 mg Oral Q4H PRN    naloxone (NARCAN) injection 0.4 mg  0.4 mg IntraVENous PRN    diphenhydrAMINE (BENADRYL) injection 12.5 mg  12.5 mg IntraVENous Q4H PRN    ondansetron (ZOFRAN) injection 4 mg  4 mg IntraVENous Q4H PRN    nicotine (NICODERM CQ) 14 mg/24 hr patch 1 Patch  1 Patch TransDERmal Q24H    heparin 25,000 units in D5W 250 ml infusion  18-36 Units/kg/hr IntraVENous TITRATE    budesonide (PULMICORT) 500 mcg/2 ml nebulizer suspension  500 mcg Nebulization BID RT    arformoterol (BROVANA) neb solution 15 mcg  15 mcg Nebulization BID RT    levoFLOXacin (LEVAQUIN) 500 mg in D5W IVPB  500 mg IntraVENous Q24H    insulin lispro (HUMALOG) injection   SubCUTAneous Q6H    glucose chewable tablet 16 g  4 Tab Oral PRN    glucagon (GLUCAGEN) injection 1 mg  1 mg IntraMUSCular PRN    albuterol-ipratropium (DUO-NEB) 2.5 MG-0.5 MG/3 ML  3 mL Nebulization Q4H PRN               Lab/Data Reviewed:       Recent Labs     10/31/19  0130 10/30/19  1505   WBC 16.7* 18.6*   HGB 11.0* 12.0*   HCT 33.8* 37.7    390     Recent Labs     10/31/19  7402 10/30/19  1505    137   K 5.2 4.9    103   CO2 24 30   * 154*   BUN 38* 30*   CREA 1.33* 1.24   CA 8.3* 8.1*       Signed By: Anthony Leyva MD     October 31, 2019

## 2019-11-01 NOTE — PROGRESS NOTES
Bedside and Verbal shift change report received from GREGORIO Ness RN (offgoing nurse). Report included the following information SBAR, Kardex, Intake/Output, MAR and Recent Results. 1951 Verified heparin drip with Fernando Null RN. Patient lying in bed comfortable watching TV denies pain. 2030 SBAR report given to LAUREANO Rabago

## 2019-11-01 NOTE — PROGRESS NOTES
1214 Pt arrived to unit. Verbal bedside report given by Masha Walter RN.    9523 Bedside and Verbal shift change report given to Lev Munoz RN (oncoming nurse) by Rowena Juarez. Merna Anne RN (offgoing nurse). Report included the following information SBAR, Kardex, Intake/Output and MAR. Pt in bed, call light and phone at bedside.

## 2019-11-02 VITALS
BODY MASS INDEX: 27.83 KG/M2 | TEMPERATURE: 98.3 F | RESPIRATION RATE: 18 BRPM | SYSTOLIC BLOOD PRESSURE: 133 MMHG | DIASTOLIC BLOOD PRESSURE: 57 MMHG | WEIGHT: 205.5 LBS | OXYGEN SATURATION: 100 % | HEIGHT: 72 IN | HEART RATE: 67 BPM

## 2019-11-02 LAB
ANION GAP SERPL CALC-SCNC: 8 MMOL/L (ref 3–18)
BASOPHILS # BLD: 0 K/UL (ref 0–0.1)
BASOPHILS NFR BLD: 0 % (ref 0–2)
BUN SERPL-MCNC: 45 MG/DL (ref 7–18)
BUN/CREAT SERPL: 41 (ref 12–20)
CALCIUM SERPL-MCNC: 8 MG/DL (ref 8.5–10.1)
CHLORIDE SERPL-SCNC: 101 MMOL/L (ref 100–111)
CO2 SERPL-SCNC: 26 MMOL/L (ref 21–32)
CREAT SERPL-MCNC: 1.1 MG/DL (ref 0.6–1.3)
DIFFERENTIAL METHOD BLD: ABNORMAL
EOSINOPHIL # BLD: 0 K/UL (ref 0–0.4)
EOSINOPHIL NFR BLD: 0 % (ref 0–5)
ERYTHROCYTE [DISTWIDTH] IN BLOOD BY AUTOMATED COUNT: 14.1 % (ref 11.6–14.5)
GLUCOSE BLD STRIP.AUTO-MCNC: 106 MG/DL (ref 70–110)
GLUCOSE BLD STRIP.AUTO-MCNC: 118 MG/DL (ref 70–110)
GLUCOSE SERPL-MCNC: 126 MG/DL (ref 74–99)
HCT VFR BLD AUTO: 31.6 % (ref 36–48)
HGB BLD-MCNC: 10.4 G/DL (ref 13–16)
LYMPHOCYTES # BLD: 0.7 K/UL (ref 0.9–3.6)
LYMPHOCYTES NFR BLD: 5 % (ref 21–52)
MAGNESIUM SERPL-MCNC: 2.3 MG/DL (ref 1.6–2.6)
MCH RBC QN AUTO: 29.7 PG (ref 24–34)
MCHC RBC AUTO-ENTMCNC: 32.9 G/DL (ref 31–37)
MCV RBC AUTO: 90.3 FL (ref 74–97)
MONOCYTES # BLD: 0.8 K/UL (ref 0.05–1.2)
MONOCYTES NFR BLD: 6 % (ref 3–10)
NEUTS SEG # BLD: 11.9 K/UL (ref 1.8–8)
NEUTS SEG NFR BLD: 89 % (ref 40–73)
PLATELET # BLD AUTO: 206 K/UL (ref 135–420)
PMV BLD AUTO: 8.7 FL (ref 9.2–11.8)
POTASSIUM SERPL-SCNC: 4.1 MMOL/L (ref 3.5–5.5)
RBC # BLD AUTO: 3.5 M/UL (ref 4.7–5.5)
SODIUM SERPL-SCNC: 135 MMOL/L (ref 136–145)
WBC # BLD AUTO: 13.4 K/UL (ref 4.6–13.2)

## 2019-11-02 PROCEDURE — 80048 BASIC METABOLIC PNL TOTAL CA: CPT

## 2019-11-02 PROCEDURE — 97166 OT EVAL MOD COMPLEX 45 MIN: CPT

## 2019-11-02 PROCEDURE — 74011636637 HC RX REV CODE- 636/637: Performed by: INTERNAL MEDICINE

## 2019-11-02 PROCEDURE — 94640 AIRWAY INHALATION TREATMENT: CPT

## 2019-11-02 PROCEDURE — 74011000250 HC RX REV CODE- 250: Performed by: HOSPITALIST

## 2019-11-02 PROCEDURE — 77010033678 HC OXYGEN DAILY

## 2019-11-02 PROCEDURE — 3331090001 HH PPS REVENUE CREDIT

## 2019-11-02 PROCEDURE — 74011250637 HC RX REV CODE- 250/637: Performed by: HOSPITALIST

## 2019-11-02 PROCEDURE — 85025 COMPLETE CBC W/AUTO DIFF WBC: CPT

## 2019-11-02 PROCEDURE — 82962 GLUCOSE BLOOD TEST: CPT

## 2019-11-02 PROCEDURE — 83735 ASSAY OF MAGNESIUM: CPT

## 2019-11-02 PROCEDURE — 94760 N-INVAS EAR/PLS OXIMETRY 1: CPT

## 2019-11-02 PROCEDURE — 97116 GAIT TRAINING THERAPY: CPT

## 2019-11-02 PROCEDURE — 74011250637 HC RX REV CODE- 250/637: Performed by: INTERNAL MEDICINE

## 2019-11-02 PROCEDURE — 3331090002 HH PPS REVENUE DEBIT

## 2019-11-02 PROCEDURE — 36415 COLL VENOUS BLD VENIPUNCTURE: CPT

## 2019-11-02 PROCEDURE — 97535 SELF CARE MNGMENT TRAINING: CPT

## 2019-11-02 PROCEDURE — 77030027138 HC INCENT SPIROMETER -A

## 2019-11-02 RX ORDER — PREDNISONE 20 MG/1
40 TABLET ORAL
Status: DISCONTINUED | OUTPATIENT
Start: 2019-11-02 | End: 2019-11-02 | Stop reason: HOSPADM

## 2019-11-02 RX ORDER — BUDESONIDE 0.5 MG/2ML
500 INHALANT ORAL 2 TIMES DAILY
Qty: 60 EACH | Refills: 0 | Status: SHIPPED
Start: 2019-11-02 | End: 2020-02-20

## 2019-11-02 RX ORDER — DILTIAZEM HYDROCHLORIDE 30 MG/1
30 TABLET, FILM COATED ORAL
Qty: 90 TAB | Refills: 0 | Status: SHIPPED | OUTPATIENT
Start: 2019-11-02 | End: 2020-02-20

## 2019-11-02 RX ORDER — LEVOFLOXACIN 500 MG/1
500 TABLET, FILM COATED ORAL DAILY
Qty: 5 TAB | Refills: 0 | Status: SHIPPED | OUTPATIENT
Start: 2019-11-02 | End: 2019-11-07

## 2019-11-02 RX ORDER — ARFORMOTEROL TARTRATE 15 UG/2ML
15 SOLUTION RESPIRATORY (INHALATION) 2 TIMES DAILY
Qty: 60 VIAL | Refills: 0 | Status: SHIPPED
Start: 2019-11-02 | End: 2019-12-02

## 2019-11-02 RX ADMIN — APIXABAN 5 MG: 5 TABLET, FILM COATED ORAL at 11:40

## 2019-11-02 RX ADMIN — DILTIAZEM HYDROCHLORIDE 30 MG: 30 TABLET, FILM COATED ORAL at 06:37

## 2019-11-02 RX ADMIN — ARFORMOTEROL TARTRATE 15 MCG: 15 SOLUTION RESPIRATORY (INHALATION) at 07:20

## 2019-11-02 RX ADMIN — BUDESONIDE 500 MCG: 0.5 INHALANT RESPIRATORY (INHALATION) at 07:20

## 2019-11-02 RX ADMIN — PREDNISONE 40 MG: 20 TABLET ORAL at 08:57

## 2019-11-02 RX ADMIN — DILTIAZEM HYDROCHLORIDE 30 MG: 30 TABLET, FILM COATED ORAL at 11:40

## 2019-11-02 NOTE — PROGRESS NOTES
Cardiology Progress Note        Patient: Valentina Mirza        Sex: male          DOA: 10/30/2019  YOB: 1945      Age:  66 y.o.        LOS:  LOS: 2 days    Patient seen and examined, chart reviewed. Assessment/Plan     Patient Active Problem List   Diagnosis Code    Hypertension I10    COPD (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 ([facility]) J44.9    Chronic renal disease, stage 3, moderately decreased glomerular filtration rate between 30-59 mL/min/1.73 square meter ([facility]) N18.3    Asbestosis ([facility] J61    Acute exacerbation of chronic obstructive pulmonary disease (COPD) ([facility] J44.1    Advanced care planning/counseling discussion Z71.89    Debility R53.81    Pneumonia of right lower lobe due to methicillin susceptible Staphylococcus aureus (MSSA) ([facility] J15.211    Paroxysmal atrial fibrillation ([facility]) I48.0    Chronic respiratory failure with hypoxia ([facility]) J96.11    COPD (chronic obstructive pulmonary disease) ([facility]) J44.9    Acute on chronic respiratory failure with hypoxia and hypercapnia ([facility]) J96.21, J96.22    Atrial fibrillation with RVR ([facility]) I48.91      Paroxysmal atrial fibrillation CHADsVASc score is 3.0  Currently patient is in sinus rhythm  Patient is off Bi PAP  Plan:    Continue Cardizem IR 30 mg by mouth 3 times a day  Continue IV heparin  Change Cardizem IR to Cardizem CD tomorrow. Discontinue IV heparin and start Eliquis 5 mg twice a day from tomorrow. Continue management as per hospital medicine  Continue management as per pulmonary critical care       Call on call cardiologist if needed for weekend. Subjective:    cc: My breathing is better      REVIEW OF SYSTEMS:     General: No fevers or chills.   Cardiovascular: No chest pain,No palpitations, No orthopnea, No PND, No leg swelling, No claudication  Pulmonary: Positive dyspnea   Gastrointestinal: No nausea, vomiting, bleeding  Neurology: No Dizziness    Objective: Visit Vitals  /72   Pulse 78   Temp 98 °F (36.7 °C)   Resp 18   Ht 6' (1.829 m)   Wt 91.2 kg (201 lb 1 oz)   SpO2 99%   BMI 27.27 kg/m²     Body mass index is 27.27 kg/m². Physical Exam:  General Appearance: Comfortable,  using accessory muscles of respiration. HEENT: TAMI. HEAD: Atraumatic  NECK: No JVD, no thyroidomeglay. CAROTIDS:  LUNGS: Clear bilaterally. HEART: S1+S2 audible, no murmur, no pericardial rub. ABD: Non-tender, BS Audible    EXT: No edema, and no cyanosis. VASCULAR EXAM: Pulses are intact. NEUROLOGICAL: AAO times 3, moves all 4 extremities.   Medication:  Current Facility-Administered Medications   Medication Dose Route Frequency    insulin lispro (HUMALOG) injection   SubCUTAneous AC&HS    [START ON 11/2/2019] methylPREDNISolone (PF) (SOLU-MEDROL) injection 40 mg  40 mg IntraVENous Q24H    tamsulosin (FLOMAX) capsule 0.4 mg  0.4 mg Oral QPM    dilTIAZem (CARDIZEM) IR tablet 30 mg  30 mg Oral TIDAC    sodium chloride (NS) flush 5-10 mL  5-10 mL IntraVENous PRN    acetaminophen (TYLENOL) tablet 650 mg  650 mg Oral Q4H PRN    naloxone (NARCAN) injection 0.4 mg  0.4 mg IntraVENous PRN    diphenhydrAMINE (BENADRYL) injection 12.5 mg  12.5 mg IntraVENous Q4H PRN    ondansetron (ZOFRAN) injection 4 mg  4 mg IntraVENous Q4H PRN    nicotine (NICODERM CQ) 14 mg/24 hr patch 1 Patch  1 Patch TransDERmal Q24H    heparin 25,000 units in D5W 250 ml infusion  18-36 Units/kg/hr IntraVENous TITRATE    budesonide (PULMICORT) 500 mcg/2 ml nebulizer suspension  500 mcg Nebulization BID RT    arformoterol (BROVANA) neb solution 15 mcg  15 mcg Nebulization BID RT    levoFLOXacin (LEVAQUIN) 500 mg in D5W IVPB  500 mg IntraVENous Q24H    glucose chewable tablet 16 g  4 Tab Oral PRN    glucagon (GLUCAGEN) injection 1 mg  1 mg IntraMUSCular PRN    albuterol-ipratropium (DUO-NEB) 2.5 MG-0.5 MG/3 ML  3 mL Nebulization Q4H PRN               Lab/Data Reviewed:       Recent Labs 11/01/19 0440 10/31/19  0130 10/30/19  1505   WBC 16.8* 16.7* 18.6*   HGB 10.3* 11.0* 12.0*   HCT 31.3* 33.8* 37.7    249 390     Recent Labs     11/01/19  0440 10/31/19  0130 10/30/19  1505    137 137   K 4.2 5.2 4.9    102 103   CO2 26 24 30   * 153* 154*   BUN 45* 38* 30*   CREA 1.18 1.33* 1.24   CA 8.4* 8.3* 8.1*       Signed By: Farida Hilliard MD     November 1, 2019

## 2019-11-02 NOTE — PROGRESS NOTES
Pulmonary Specialists  Pulmonary, Critical Care, and Sleep Medicine    Name: Miryam Jimenez MRN: [id number]   : 1945 Hospital: [facility] [place]    Date: 2019  Room: [facility]/[address] [place] [address] Note                                              Consult requesting physician: Dr. Keshia High  Reason for Consult: CODP exacerbation, AFib with RVR      IMPRESSION:   · Acute on chronic hypercarbic and hypoxic respiratory failure. (J96.21, J96.22)  · AE-COPD (J44.1)  · A. fib with RVR. (I48.91)  ·   Patient Active Problem List   Diagnosis Code    Hypertension I10    COPD (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 ([facility]) J44.9    Chronic renal disease, stage 3, moderately decreased glomerular filtration rate between 30-59 mL/min/1.73 square meter ([facility]) N18.3    Asbestosis ([facility] J61    Acute exacerbation of chronic obstructive pulmonary disease (COPD) ([facility] J44.1    Advanced care planning/counseling discussion Z71.89    Debility R53.81    Pneumonia of right lower lobe due to methicillin susceptible Staphylococcus aureus (MSSA) ([facility] J15.211    Paroxysmal atrial fibrillation ([facility]) I48.0    Chronic respiratory failure with hypoxia ([facility]) J96.11    COPD (chronic obstructive pulmonary disease) ([facility]) J44.9    Acute on chronic respiratory failure with hypoxia and hypercapnia (H[facility]) J96.21, J96.22    Atrial fibrillation with RVR ([facility]) I48.91       · Code status: Full code      RECOMMENDATIONS:   Respiratory: Admitted with sudden onset of dyspnea associated with coughing and wheezing. CTA negative for PE. No pneumonia. Chronic pleural space calcification and collection which has been stable since 2014. Initially required BiPAP, now on nasal cannula home dose 2-3 LPM, at his baseline oxygen requirement. Respiratory status improved. Continue Brovana nebulizer twice daily. Continue Pulmicort nebulizer twice daily. Continue DuoNeb as needed.   Change Solu-Medrol to prednisone 40 mg daily and taper over next 4 to 7 days. Levaquin for mild COPD exacerbation/bronchitis/leukocytosis, complete 5-day course. Leukocytosis improving. No fever. Keep SPO2 >=92%. HOB 30 degree elevation all the time. Aggressive pulmonary toileting. Aspiration precautions. Incentive spirometry. All of the following issues being managed by the hospitalist and respective consultant:    CVS: Chronic history of A. fib, he is not on anticoagulation at home for some reason. Admitted with A. fib with RVR. Initially required Cardizem drip. Now on p.o. Cardizem. On heparin drip which will be changed to Eliquis per cardiology recommendation. Echo noted. Defer to cardiology. ID: Leukocytosis could be due to stress and possible bronchitis. Leukocytosis improving. Continue Levaquin, complete 5-day course. Not producing sputum for collection/culture. Blood culture NGTD. Hematology/Oncology: Chronic anemia. No acute bleeding. Renal: Hx CKD. Monitor renal function. Stable currently. Prophylaxis: DVT Prophylaxis: SCD/heparin. GI Prophylaxis: Low risk for stress ulcer. Lines/Tubes: PIV    Will defer respective systems problem management to primary and other respective consultant     Care of plan d/w RN, RT.  D/w patient (answered all questions to satisfaction). Moderate complexity decision making was performed during the evaluation of this patient at high risk for decompensation with multiple organ involvement. Okay to discharge to his assisted living facility/skilled nursing home from pulmonary standpoint. Follow-up with me in office in next 2 to 3 weeks.        Subjective/History of Present Illness:     Patient is a 66 y.o. male with PMHx significant for HTN, CKD, paroxysmal A. 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 fib with RVR. Due to recurrent pulmonary infection, ICS was discontinued during last hospitalization 08/2019 and he was started on chronic azithromycin to 50 mg Monday Wednesday and Friday. He supposed to be on Anoro Ellipta at home. Being followed by Dr. Kathryn Heller, pulmonary in office. Patient canceled last office follow-up as he could not come to office. Complaint of sudden onset of dyspnea started 10/30/2019, with associated wheezing, dry cough without sputum production or hemoptysis. Denies leg edema or PND. No lightheadedness, dizziness, palpitation. Found to have A. fib with RVR, started on Cardizem and admitted. He is not on any anticoagulation at home. 11/2/2019:  Transferred out of ICU on 11/1/2019. He feels dyspnea has improved. Denies dyspnea at rest.  Denies wheezing but I heard mild wheezing on exam.  No cough, sputum production, fever, chills, chest pain, leg edema, palpitation. On heparin drip. A. fib rate controlled. On NC 2-3 LPM  AFib rate controlled  No other overnight issues      I/O last 24 hrs: Intake/Output Summary (Last 24 hours) at 11/2/2019 0834  Last data filed at 11/2/2019 0704  Gross per 24 hour   Intake 431.65 ml   Output 1100 ml   Net -668.35 ml         The patient is critically ill.   History taken from patient, EMR     Review of Systems:   HEENT: No epistaxis, no nasal drainage, no difficulty in swallowing, no redness in eyes  Respiratory: as above  Cardiovascular: no chest pain, no palpitations, no chronic leg edema, no syncope  Gastrointestinal: no abd pain, no vomiting, no diarrhea, no bleeding symptoms  Genitourinary: No urinary symptoms or hematuria  Musculoskeletal: Neg  Neurological: No focal weakness, no seizures, no headaches  Behvioral/Psych: No anxiety, no depression  Constitutional: No fever, no chills, no weight loss, no night sweats     Allergies   Allergen Reactions    Aspirin Other (comments)     \"messes my stomach up\"      Past Medical History:   Diagnosis Date    Cancer [facility])     prostate    COPD (chronic obstructive pulmonary disease) ([address]     Hypertension     Nocturia     Pneumonia     Radiation effect       Past Surgical History:   Procedure Laterality Date    HX HERNIA REPAIR        Social History     Tobacco Use    Smoking status: Former Smoker     Types: Cigarettes    Smokeless tobacco: Never Used   Substance Use Topics    Alcohol use: No      Family History   Problem Relation Age of Onset    Heart Disease Mother       Prior to Admission medications    Medication Sig Start Date End Date Taking? Authorizing Provider   ibuprofen (ADVIL) 100 mg tablet Take 100 mg by mouth every six (6) hours as needed for Pain. Yes Provider, Historical   acetaminophen (TYLENOL) 325 mg tablet Take 500 mg by mouth every four (4) hours as needed for Pain. Yes Provider, Historical   predniSONE (DELTASONE) 10 mg tablet Prednisone 10mg tabs: p.o.  4 tabs daily for 2 days then drop to   3 tabs daily for 2 days then drop to   2 tabs daily for 2 days then drop to   1 tab daily for 2 days then stop. Dispense 20 tabs 10/22/19  Yes Jenifer Daniels MD   OXYGEN-AIR DELIVERY SYSTEMS Take 2 L by inhalation continuous. Yes Provider, Historical   fluticasone propionate (FLONASE) 50 mcg/actuation nasal spray 2 Sprays by Both Nostrils route daily. Yes Provider, Historical   dextran 70/hypromellose (ARTIFICIAL TEARS, PF, OP) Apply 2 Drops to eye as needed for Other (both eyes for dryness). Yes Provider, Historical   diphenhydrAMINE (BENADRYL) 25 mg capsule Take 25 mg by mouth nightly as needed for Itching. Yes Provider, Historical   albuterol-ipratropium (DUO-NEB) 2.5 mg-0.5 mg/3 ml nebu 3 mL by Nebulization route every four (4) hours as needed. Patient taking differently: 3 mL by Nebulization route every four (4) hours as needed for Other (Shortness of breath).  2/9/19  Yes Keith Petty, DO albuterol (PROVENTIL HFA, VENTOLIN HFA, PROAIR HFA) 90 mcg/actuation inhaler Take 2 Puffs by inhalation every four (4) hours as needed for Wheezing or Shortness of Breath. 18  Yes Be Gannon PA-C   ipratropium (ATROVENT) 0.03 % nasal spray 2 Sprays by Both Nostrils route every twelve (12) hours as needed for Rhinitis. Yes Shant, MD Blayne   tamsulosin (FLOMAX) 0.4 mg capsule Take 0.4 mg by mouth every evening. Yes Other, MD Blayne   chlorthalidone (HYGROTEN) 25 mg tablet Take  by mouth daily.    Yes Shant, MD Blayne     Current Facility-Administered Medications   Medication Dose Route Frequency    predniSONE (DELTASONE) tablet 40 mg  40 mg Oral DAILY WITH BREAKFAST    insulin lispro (HUMALOG) injection   SubCUTAneous AC&HS    tamsulosin (FLOMAX) capsule 0.4 mg  0.4 mg Oral QPM    dilTIAZem (CARDIZEM) IR tablet 30 mg  30 mg Oral TIDAC    nicotine (NICODERM CQ) 14 mg/24 hr patch 1 Patch  1 Patch TransDERmal Q24H    heparin 25,000 units in D5W 250 ml infusion  18-36 Units/kg/hr IntraVENous TITRATE    budesonide (PULMICORT) 500 mcg/2 ml nebulizer suspension  500 mcg Nebulization BID RT    arformoterol (BROVANA) neb solution 15 mcg  15 mcg Nebulization BID RT    levoFLOXacin (LEVAQUIN) 500 mg in D5W IVPB  500 mg IntraVENous Q24H         Objective:   Vital Signs:    Visit Vitals  /67 (BP 1 Location: Left arm, BP Patient Position: At rest)   Pulse 77   Temp 98 °F (36.7 °C)   Resp 18   Ht 6' (1.829 m)   Wt 93.2 kg (205 lb 8 oz)   SpO2 100%   BMI 27.87 kg/m²       O2 Device: Nasal cannula   O2 Flow Rate (L/min): 2 l/min   Temp (24hrs), Av.2 °F (36.8 °C), Min:98 °F (36.7 °C), Max:98.4 °F (36.9 °C)       Intake/Output:   Last shift:      701 - 1900  In: -   Out: 300 [Urine:300]    Last 3 shifts: 10/31 1901 -  0700  In: 673.4 [P.O.:390; I.V.:283.4]  Out: 3300 [Urine:3300]      Intake/Output Summary (Last 24 hours) at 2019 0834  Last data filed at 2019 7015  Gross per 24 hour   Intake 431.65 ml   Output 1100 ml   Net -668.35 ml       Last 3 Recorded Weights in this Encounter    10/31/19 1523 11/02/19 0338 11/02/19 0426   Weight: 91.2 kg (201 lb 1 oz) 93.5 kg (206 lb 2.1 oz) 93.2 kg (205 lb 8 oz)           Recent Labs     10/30/19  2004 10/30/19  1627 10/30/19  1523   PHI 7.375 7.242* 7.188*   PCO2I 43.5 63.9* 79.2*   PO2I 128* 105* 512*   HCO3I 25.6 27.5* 30.1*   FIO2I 0.3 50 100       Physical Exam:     General/Neurology: Alert, Awake, NAD. No accessory muscle use. Nasal cannula oxygen. Head:   Normocephalic, without obvious abnormality, atraumatic. Eye:   EOM intact, no scleral icterus, no pallor, no cyanosis. Throat:  Lips, mucosa, and tongue normal. No oral thrush. Neck:   Supple, symmetric. No lymphadenopathy. Trachea midline  Lung: Moderate air entry bilateral equal.  No crepitus/no rhonchi. Mild end expiratory wheezing. No prolonged expiration. No accessory muscle use. Heart:   Irregular. S1 S2 present. No murmur. No JVD. Abdomen:  Soft. NT. ND. +BS. No masses. Extremities:  No pedal edema. No cyanosis. No clubbing. Pulses: 2+ and symmetric in DP. Capillary refill: normal  Lymphatic:  No cervical or supraclavicular palpable lymphadenopathy.        Data:       Recent Results (from the past 24 hour(s))   GLUCOSE, POC    Collection Time: 11/01/19 11:52 AM   Result Value Ref Range    Glucose (POC) 116 (H) 70 - 110 mg/dL   GLUCOSE, POC    Collection Time: 11/01/19  4:58 PM   Result Value Ref Range    Glucose (POC) 137 (H) 70 - 110 mg/dL   GLUCOSE, POC    Collection Time: 11/01/19  9:00 PM   Result Value Ref Range    Glucose (POC) 126 (H) 70 - 110 mg/dL   MAGNESIUM    Collection Time: 11/02/19  2:10 AM   Result Value Ref Range    Magnesium 2.3 1.6 - 2.6 mg/dL   CBC WITH AUTOMATED DIFF    Collection Time: 11/02/19  2:10 AM   Result Value Ref Range    WBC 13.4 (H) 4.6 - 13.2 K/uL    RBC 3.50 (L) 4.70 - 5.50 M/uL    HGB 10.4 (L) 13.0 - 16.0 g/dL    HCT 31.6 (L) 36.0 - 48.0 %    MCV 90.3 74.0 - 97.0 FL    MCH 29.7 24.0 - 34.0 PG    MCHC 32.9 31.0 - 37.0 g/dL    RDW 14.1 11.6 - 14.5 %    PLATELET 720 305 - 938 K/uL    MPV 8.7 (L) 9.2 - 11.8 FL    NEUTROPHILS 89 (H) 40 - 73 %    LYMPHOCYTES 5 (L) 21 - 52 %    MONOCYTES 6 3 - 10 %    EOSINOPHILS 0 0 - 5 %    BASOPHILS 0 0 - 2 %    ABS. NEUTROPHILS 11.9 (H) 1.8 - 8.0 K/UL    ABS. LYMPHOCYTES 0.7 (L) 0.9 - 3.6 K/UL    ABS. MONOCYTES 0.8 0.05 - 1.2 K/UL    ABS. EOSINOPHILS 0.0 0.0 - 0.4 K/UL    ABS. BASOPHILS 0.0 0.0 - 0.1 K/UL    DF AUTOMATED     METABOLIC PANEL, BASIC    Collection Time: 11/02/19  2:10 AM   Result Value Ref Range    Sodium 135 (L) 136 - 145 mmol/L    Potassium 4.1 3.5 - 5.5 mmol/L    Chloride 101 100 - 111 mmol/L    CO2 26 21 - 32 mmol/L    Anion gap 8 3.0 - 18 mmol/L    Glucose 126 (H) 74 - 99 mg/dL    BUN 45 (H) 7.0 - 18 MG/DL    Creatinine 1.10 0.6 - 1.3 MG/DL    BUN/Creatinine ratio 41 (H) 12 - 20      GFR est AA >60 >60 ml/min/1.73m2    GFR est non-AA >60 >60 ml/min/1.73m2    Calcium 8.0 (L) 8.5 - 10.1 MG/DL   GLUCOSE, POC    Collection Time: 11/02/19  6:09 AM   Result Value Ref Range    Glucose (POC) 118 (H) 70 - 110 mg/dL         Chemistry Recent Labs     11/02/19  0210 11/01/19  0440 10/31/19  0130 10/30/19  1505   * 144* 153* 154*   * 136 137 137   K 4.1 4.2 5.2 4.9    102 102 103   CO2 26 26 24 30   BUN 45* 45* 38* 30*   CREA 1.10 1.18 1.33* 1.24   CA 8.0* 8.4* 8.3* 8.1*   MG 2.3 2.0 2.1  --    AGAP 8 8 11 4   BUCR 41* 38* 29* 24*   AP  --   --   --  80   TP  --   --   --  6.4   ALB  --   --   --  3.7   GLOB  --   --   --  2.7   AGRAT  --   --   --  1.4        Lactic Acid Lactic acid   Date Value Ref Range Status   07/07/2019 1.4 0.4 - 2.0 MMOL/L Final     No results for input(s): LAC in the last 72 hours.      Liver Enzymes Protein, total   Date Value Ref Range Status   10/30/2019 6.4 6.4 - 8.2 g/dL Final     Albumin   Date Value Ref Range Status   10/30/2019 3.7 3.4 - 5.0 g/dL Final     Globulin   Date Value Ref Range Status   10/30/2019 2.7 2.0 - 4.0 g/dL Final     A-G Ratio   Date Value Ref Range Status   10/30/2019 1.4 0.8 - 1.7   Final     AST (SGOT)   Date Value Ref Range Status   10/30/2019 10 10 - 38 U/L Final     Alk. phosphatase   Date Value Ref Range Status   10/30/2019 80 45 - 117 U/L Final     Recent Labs     10/30/19  1505   TP 6.4   ALB 3.7   GLOB 2.7   AGRAT 1.4   SGOT 10   AP 80        CBC w/Diff Recent Labs     11/02/19  0210 11/01/19  0440 10/31/19  0130   WBC 13.4* 16.8* 16.7*   RBC 3.50* 3.49* 3.72*   HGB 10.4* 10.3* 11.0*   HCT 31.6* 31.3* 33.8*    231 249   GRANS 89* 96* 92*   LYMPH 5* 2* 3*   EOS 0 0 0        Cardiac Enzymes No results found for: CPK, CK, CKMMB, CKMB, RCK3, CKMBT, CKNDX, CKND1, WILLARD, TROPT, TROIQ, DENNYS, TROPT, TNIPOC, BNP, BNPP     BNP No results found for: BNP, BNPP, XBNPT     Coagulation Recent Labs     11/01/19  0440 10/31/19  1712 10/31/19  1020   APTT 110.0* 106.4* 135.1*         Thyroid  Lab Results   Component Value Date/Time    TSH 0.86 10/20/2019 01:05 AM       No results found for: T4     Urinalysis Lab Results   Component Value Date/Time    Color YELLOW 10/31/2019 04:00 AM    Appearance CLEAR 10/31/2019 04:00 AM    Specific gravity 1.021 10/31/2019 04:00 AM    pH (UA) 5.5 10/31/2019 04:00 AM    Protein NEGATIVE  10/31/2019 04:00 AM    Glucose NEGATIVE  10/31/2019 04:00 AM    Ketone NEGATIVE  10/31/2019 04:00 AM    Bilirubin NEGATIVE  10/31/2019 04:00 AM    Urobilinogen 1.0 10/31/2019 04:00 AM    Nitrites NEGATIVE  10/31/2019 04:00 AM    Leukocyte Esterase NEGATIVE  10/31/2019 04:00 AM    Bacteria FEW (A) 09/08/2018 12:30 PM    WBC 0 to 3 09/08/2018 12:30 PM    RBC 0 to 3 09/08/2018 12:30 PM        Micro  Recent Labs     10/30/19  1520 10/30/19  1505   CULT NO GROWTH 3 DAYS NO GROWTH 3 DAYS     Recent Labs     10/30/19  1520 10/30/19  1505   CULT NO GROWTH 3 DAYS NO GROWTH 3 DAYS          Culture data during this hospitalization. All Micro Results     Procedure Component Value Units Date/Time    CULTURE, BLOOD [[id number]] Collected:  10/30/19 1505    Order Status:  Completed Specimen:  Blood Updated:  11/02/19 0222     Special Requests: NO SPECIAL REQUESTS        Culture result: NO GROWTH 3 DAYS       CULTURE, BLOOD [[id number]] Collected:  10/30/19 1520    Order Status:  Completed Specimen:  Blood Updated:  11/02/19 0222     Special Requests: NO SPECIAL REQUESTS        Culture result: NO GROWTH 3 DAYS                    ECHO 10/21/2019: · Left Ventricle: Normal cavity size and systolic function (ejection fraction normal). Mild concentric hypertrophy. Estimated left ventricular ejection fraction is 51 - 55%. Mild (grade 1) left ventricular diastolic dysfunction. · Aortic Valve: Probably trileaflet aortic valve. · IVC/Hepatic Veins: Moderately elevated central venous pressure (10-15 mmHg); IVC diameter is larger than 21 mm and collapses more than 50% with respiration. Images report reviewed by me:  CT (Most Recent) (CT chest reviewed by me) Results from Hospital Encounter encounter on 10/30/19   CTA CHEST W OR W WO CONT    Narrative EXAM: CTA Chest    INDICATION: Acute on chronic respiratory failure with hypoxia and hypercapnia. Evaluation for potential embolism. COMPARISON: Correlation made with prior CT scan of the chest obtained July 7, 2019. Correlation made with multiple prior radiographs, most recent 10/30/2019    TECHNIQUE: Axial CT imaging from the thoracic inlet through the diaphragm with  intravenous contrast utilizing CTA study for pulmonary artery evaluation. Coronal and sagittal MIP reformations were generated at a separate workstation. One or more dose reduction techniques were used on this CT: automated exposure  control, adjustment of the mAs and/or kVp according to patient size, and  iterative reconstruction techniques.   The specific techniques used on this CT  exam have been documented in the patient's electronic medical record. Digital  Imaging and Communications in Medicine (DICOM) format image data are available  to nonaffiliated external healthcare facilities or entities on a secure, media  free, reciprocally searchable basis with patient authorization for at least a  12-month period after this study. _______________    FINDINGS:    EXAM QUALITY: Overall exam quality is adequate. Pulmonary arterial enhancement  is adequate. The breath hold is satisfactory. PULMONARY ARTERIES: No convincing evidence of pulmonary embolism. MEDIASTINUM: Included thyroid gland contains several small hypodensities which  are too small to require further characterization. Great vessels are of normal  caliber. Thoracic aorta normal in course and caliber. Normal cardiac size. No  pericardial effusion. Small quantity of fluid within the superior pericardial  recess. LYMPH NODES: No enlarged mediastinal or hilar nodes by size criteria. AIRWAY: Central airways are patent. Small quantity of endobronchial secretions  noted at the distal right mainstem bronchus. No discrete foci of endobronchial  mucoid impaction. LUNGS: There are basilar areas of atelectasis which involve the lower lobes  bilaterally. Significantly improved aeration of the left lower lobe noted in the  interval from prior CT scan. No significant groundglass abnormality or septal  line thickening. No alveolar consolidation. PLEURA: As previously, elliptical morphology peripherally calcified structures  are noted within the anterior aspect of the right pleural space with additional  posterior right hemithoracic peripherally calcified pleural space collection  redemonstrated. This appears unchanged from examination of March 19, 2017 and  demonstrates radiographic stability from 2014. No evidence of pneumothorax or  pleural effusion. UPPER ABDOMEN: Small hiatal hernia. Multiple punctate granulomata throughout the  liver.  No acute abnormality present. .    OTHER: No acute or aggressive osseous abnormalities identified. _______________      Impression IMPRESSION:    1. No evidence of pulmonary embolism. 2.  Basilar areas of atelectasis without findings to suggest pneumonia,  pulmonary edema, or pleural effusion. 3. 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 4. Small hiatal hernia. CXR reviewed by me:  XR (Most Recent). CXR  reviewed by me and compared with previous CXR Results from Hospital Encounter encounter on 10/30/19   XR CHEST PORT    Narrative EXAM: XR CHEST PORT    CLINICAL INDICATION/HISTORY: Respiratory distress  -Additional: None    COMPARISON: Several prior exams, most recently chest radiograph dated 10/20/2019    TECHNIQUE: Frontal view of the chest    _______________    FINDINGS:    HEART AND MEDIASTINUM: Normal cardiac size and mediastinal contours. LUNGS AND PLEURAL SPACES: Elevated right hemidiaphragm and volume loss in the  right hemithorax unchanged from prior examination. No focal pneumonic opacity. No pneumothorax or pleural effusion. Partially peripherally calcified  pleural-based lesion projecting over the right chest wall unchanged from  multiple prior examinations. The linear opacities at each lung base are similar  in appearance to prior. Improved aeration of the right lung base from recent  radiographs. BONY THORAX AND SOFT TISSUES: No acute osseous abnormality    _______________      Impression IMPRESSION:      1. No acute radiographic cardiopulmonary abnormality. Unchanged basilar areas of  scarring or atelectasis.            Clarence Alan MD  11/2/2019

## 2019-11-02 NOTE — PROGRESS NOTES
Problem: Mobility Impaired (Adult and Pediatric)  Goal: *Acute Goals and Plan of Care (Insert Text)  Description  Physical Therapy Goals  Initiated 11/1/2019 and to be accomplished within 3-7 day(s)  1. Patient will move from supine to sit and sit to supine  in bed with supervision/set-up. 2.  Patient will transfer from bed to chair and chair to bed with supervision/set-up using the least restrictive device. 3.  Patient will perform sit to stand with supervision/set-up. 4.  Patient will ambulate with supervision/set-up for 150 feet with the least restrictive device. 5.  Patient will ascend/descend 3 stairs with handrail(s) with supervision/set-up. Outcome: Progressing Towards Goal   PHYSICAL THERAPY TREATMENT    Patient: Chip Soto (30 y.o. male)  Date: 11/2/2019  Diagnosis: Atrial fibrillation with RVR (HCC) [I48.91]  Respiratory failure ([address] [J96.90] Acute on chronic respiratory failure with hypoxia and hypercapnia ([facility])       Precautions: Fall   Chart, physical therapy assessment, plan of care and goals were reviewed. ASSESSMENT:  Pt supine in bed, willing to participate. Pt states arthritis in RLE prevents him from \"doing more. \" Pt is retired . Pt amb without 02 donned 20 ft 2x with RW and CGA. Increased SOB noted but Sp02 did not drop below 96% after each amb. Pt amb with more antalgic gait during second 20ft. Pt states he would like to go to rehab, has 6 JOLYNN. Progression toward goals:  ?      Improving appropriately and progressing toward goals  ? Improving slowly and progressing toward goals  ? Not making progress toward goals and plan of care will be adjusted     PLAN:  Patient continues to benefit from skilled intervention to address the above impairments. Continue treatment per established plan of care.   Discharge Recommendations:  Rehab and Home Health  Further Equipment Recommendations for Discharge:  N/A     SUBJECTIVE:   Patient stated I don't know why they have me on this oxygen.     OBJECTIVE DATA SUMMARY:   Critical Behavior:  Neurologic State: Alert  Orientation Level: Oriented X4  Cognition: Appropriate decision making, Appropriate safety awareness  Safety/Judgement: Awareness of environment, Good awareness of safety precautions  Functional Mobility Training:  Bed Mobility:  Rolling: Contact guard assistance  Supine to Sit: Contact guard assistance  Sit to Supine: Contact guard assistance  Scooting: Stand-by assistance  Transfers:  Sit to Stand: Contact guard assistance  Stand to Sit: Stand-by assistance  Balance:  Sitting: Intact  Standing: Intact; With support  Ambulation/Gait Training:  Distance (ft): 40 Feet (ft)(20x2)  Assistive Device: Gait belt;Walker, rolling  Ambulation - Level of Assistance: Contact guard assistance  Gait Abnormalities: Antalgic;Decreased step clearance  Base of Support: Widened   Speed/[place]: Slow  Step Length: Right shortened;Left shortened  Pain:  Pain Scale 1: Numeric (0 - 10)  Pain Intensity 1: 0  Activity Tolerance:   good  Please refer to the flowsheet for vital signs taken during this treatment. After treatment:   ? Patient left in no apparent distress sitting up in chair  ? Patient left in no apparent distress in bed  ? Call bell left within reach  ? Nursing notified  ? Caregiver present  ?  Bed alarm activated      Ricky Huggins   Time Calculation: 23 mins

## 2019-11-02 NOTE — PROGRESS NOTES
0700 Received bedside report from Todd Sierra RN. Pt in bed with call bell in reach. Heparin verified. [address] telephone report to Maico Diamond RN at [facility]. Scheduled for pickup from [address] [place] [address] at [phone].

## 2019-11-02 NOTE — PROGRESS NOTES
Problem: Chronic Obstructive Pulmonary Disease (COPD)  Goal: *Oxygen saturation during activity within specified parameters  Outcome: Progressing Towards Goal  Goal: *Able to remain out of bed as prescribed  Outcome: Progressing Towards Goal  Goal: *Absence of hypoxia  Outcome: Progressing Towards Goal  Goal: *Optimize nutritional status  Outcome: Progressing Towards Goal     Problem: Falls - Risk of  Goal: *Absence of Falls  Description  Document Jenaro Cardoza Fall Risk and appropriate interventions in the flowsheet. Outcome: Progressing Towards Goal  Note:   Fall Risk Interventions:  Mobility Interventions: Patient to call before getting OOB, Utilize walker, cane, or other assistive device         Medication Interventions: Bed/chair exit alarm, Assess postural VS orthostatic hypotension, Patient to call before getting OOB, Teach patient to arise slowly    Elimination Interventions: Call light in reach, Patient to call for help with toileting needs              Problem: Patient Education: Go to Patient Education Activity  Goal: Patient/Family Education  Outcome: Progressing Towards Goal     Problem: Pressure Injury - Risk of  Goal: *Prevention of pressure injury  Description  Document Nikos Scale and appropriate interventions in the flowsheet.   Outcome: Progressing Towards Goal  Note:   Pressure Injury Interventions:  Sensory Interventions: Minimize linen layers    Moisture Interventions: Minimize layers, Maintain skin hydration (lotion/cream)    Activity Interventions: Pressure redistribution bed/mattress(bed type), PT/OT evaluation    Mobility Interventions: Pressure redistribution bed/mattress (bed type)    Nutrition Interventions: Document food/fluid/supplement intake    Friction and Shear Interventions: Minimize layers, Apply protective barrier, creams and emollients                Problem: Patient Education: Go to Patient Education Activity  Goal: Patient/Family Education  Outcome: Progressing Towards Goal Problem: Afib Pathway: Day 1  Goal: Off Pathway (Use only if patient is Off Pathway)  Outcome: Progressing Towards Goal  Goal: Activity/Safety  Outcome: Progressing Towards Goal  Goal: Consults, if ordered  Outcome: Progressing Towards Goal  Goal: Diagnostic Test/Procedures  Outcome: Progressing Towards Goal  Goal: Nutrition/Diet  Outcome: Progressing Towards Goal  Goal: Discharge Planning  Outcome: Progressing Towards Goal  Goal: Medications  Outcome: Progressing Towards Goal  Goal: Respiratory  Outcome: Progressing Towards Goal  Goal: Treatments/Interventions/Procedures  Outcome: Progressing Towards Goal  Goal: Psychosocial  Outcome: Progressing Towards Goal  Goal: *Optimal pain control at patient's stated goal  Outcome: Progressing Towards Goal  Goal: *Hemodynamically stable  Outcome: Progressing Towards Goal  Goal: *Stable cardiac rhythm  Outcome: Progressing Towards Goal  Goal: *Lungs clear or at baseline  Outcome: Progressing Towards Goal  Goal: *Labs within defined limits  Outcome: Progressing Towards Goal  Goal: *Describes available resources and support systems  Outcome: Progressing Towards Goal     Problem: Patient Education: Go to Patient Education Activity  Goal: Patient/Family Education  Outcome: Progressing Towards Goal

## 2019-11-02 NOTE — PROGRESS NOTES
1920. Assumed care of patient. Patient is alert and oriented, Patient denies chest pain, dizziness, lightheadedness. RR 22, scattered expiratory wheezing noted. White board updated, bed wheels locked, call bell in reach. 2130. Patient resting in bed, no complaints voiced, no distress noted.     2330. Patient resting quietly in bed with eyes closed. Respirations regular, no acute distress noted. Call bell within reach. 0100. Patient resting quietly in bed with eyes closed. Respirations regular, no acute distress noted. Call bell within reach. 0300. Patient resting quietly in bed with eyes closed. Respirations regular, no acute distress noted. Call bell within reach. 5205. IV heparin rate has been adjusted based on the most recent PTT results. No adjustment needed. Lab Results   Component Value Date/Time    aPTT 110.0 (H) 11/01/2019 04:40 AM       0700. Bedside and Verbal shift change [facility]coming nurse) by Yolanda Moreno RN (offgoing nurse). Report included the following information SBAR, Intake/Output, MAR and Recent Results.

## 2019-11-02 NOTE — PROGRESS NOTES
Problem: Chronic Obstructive Pulmonary Disease (COPD)  Goal: *Oxygen saturation during activity within specified parameters  Outcome: Resolved/Met  Goal: *Able to remain out of bed as prescribed  Outcome: Resolved/Met  Goal: *Absence of hypoxia  Outcome: Resolved/Met  Goal: *Optimize nutritional status  Outcome: Resolved/Met     Problem: Falls - Risk of  Goal: *Absence of Falls  Description  Document Darell Fall Risk and appropriate interventions in the flowsheet. Outcome: Resolved/Met  Note:   Fall Risk Interventions:  Mobility Interventions: Communicate number of staff needed for ambulation/transfer         Medication Interventions: Teach patient to arise slowly    Elimination Interventions: Call light in reach              Problem: Patient Education: Go to Patient Education Activity  Goal: Patient/Family Education  Outcome: Resolved/Met     Problem: Pressure Injury - Risk of  Goal: *Prevention of pressure injury  Description  Document Nikos Scale and appropriate interventions in the flowsheet.   Outcome: Resolved/Met  Note:   Pressure Injury Interventions:  Sensory Interventions: Assess changes in LOC    Moisture Interventions: Absorbent underpads    Activity Interventions: Increase time out of bed    Mobility Interventions: PT/OT evaluation    Nutrition Interventions: Document food/fluid/supplement intake, Offer support with meals,snacks and hydration    Friction and Shear Interventions: Minimize layers, Apply protective barrier, creams and emollients                Problem: Patient Education: Go to Patient Education Activity  Goal: Patient/Family Education  Outcome: Resolved/Met     Problem: Afib Pathway: Day 1  Goal: Off Pathway (Use only if patient is Off Pathway)  Outcome: Resolved/Met  Goal: Activity/Safety  Outcome: Resolved/Met  Goal: Consults, if ordered  Outcome: Resolved/Met  Goal: Diagnostic Test/Procedures  Outcome: Resolved/Met  Goal: Nutrition/Diet  Outcome: Resolved/Met  Goal: Discharge Planning  Outcome: Resolved/Met  Goal: Medications  Outcome: Resolved/Met  Goal: Respiratory  Outcome: Resolved/Met  Goal: Treatments/Interventions/Procedures  Outcome: Resolved/Met  Goal: Psychosocial  Outcome: Resolved/Met  Goal: *Optimal pain control at patient's stated goal  Outcome: Resolved/Met  Goal: *Hemodynamically stable  Outcome: Resolved/Met  Goal: *Stable cardiac rhythm  Outcome: Resolved/Met  Goal: *Lungs clear or at baseline  Outcome: Resolved/Met  Goal: *Labs within defined limits  Outcome: Resolved/Met  Goal: *Describes available resources and support systems  Outcome: Resolved/Met     Problem: Patient Education: Go to Patient Education Activity  Goal: Patient/Family Education  Outcome: Resolved/Met     Problem: Patient Education: Go to Patient Education Activity  Goal: Patient/Family Education  Outcome: Resolved/Met

## 2019-11-02 NOTE — PROGRESS NOTES
DC Plan: Discharge to [facility] by medical transport today by 2p [phone]    Please include all hard scripts for controlled substances, med rec and dc summary in packet. Please medicate for pain prior to dc if possible and needed to help offset delay when patient first arrives to facility. Chart reviewed as CM on call. DC order/summary noted. DC summary faxed to [facility]. Primary RN aware pt will need to arrive by 2P. [facility] done. CM will cont to be available via hospital  on weekends as needed. Care Management Interventions  PCP Verified by CM: Yes  Mode of Transport at Discharge:  Other (see comment)(family)  Transition of Care Consult (CM Consult): Discharge Planning  Current Support Network: Lives with Spouse, Family Lives Nearby(daughter lives 40 min away)  Confirm Follow Up Transport: Family  Freedom of Choice Offered: Yes  Discharge Location  Discharge Placement: Home with home health(v SNF)

## 2019-11-02 NOTE — DISCHARGE INSTRUCTIONS
Patient Education        Breathing Techniques for COPD: Care Instructions  Your Care Instructions    Breathing is hard when you have chronic obstructive pulmonary disease (COPD). You may take quick, short breaths. Breathing this way makes it harder to get air into your lungs. Learning new ways to control your breathing may help. You may feel better and be able to do more. You can try three basic ways to control your breathing. They are pursed-lip breathing, diaphragmatic breathing, and breathing while bending. Use these methods when you are more short of breath than normal. Practice them often so you can do them well. Follow-up care is a key part of your treatment and safety. Be sure to make and go to all appointments, and call your doctor if you are having problems. It's also a good idea to know your test results and keep a list of the medicines you take. How can you care for yourself at home? · Pursed-lip breathing helps you breathe more air out so that your next breath can be deeper. For this type of breathing, you breathe in through your nose and out through your mouth while almost closing your lips. Breathe in for about 2 seconds, and breathe out for 4 to 6 seconds. Pursed-lip breathing decreases shortness of breath and improves your ability to exercise. · Diaphragmatic breathing helps your lungs expand so that they take in more air. ? Lie on your back, or prop yourself up on several pillows. ? Put one hand on your belly and the other on your chest. When you breathe in, push your belly out as far as possible. You should feel the hand on your belly move out, while the hand on your chest does not move. ? When you breathe out, you should feel the hand on your belly move in. When you can do this type of breathing well while lying down, learn to do it while sitting or standing. Many people with COPD find this breathing method helpful.   ? Practice diaphragmatic breathing for 20 minutes, 2 or 3 times a day.  · Breathing while bending forward at the waist may make breathing easier. It can reduce shortness of breath while you exercise or rest. It helps the diaphragm move more easily. The diaphragm is a large muscle that separates your lungs from your belly. It helps draw air into your lungs as you breathe. · Do not smoke. Smoking makes COPD worse. If you need help quitting, talk to your doctor about stop-smoking programs and medicines. These can increase your chances of quitting for good. When should you call for help? Call your doctor now or seek immediate medical care if:    · Your breathing methods do not help.     · Your shortness of breath gets worse.     · You cough up blood.     · You have swelling in your belly and legs.     · You have severe chest pain.    Watch closely for changes in your health, and be sure to contact your doctor if you have any problems. Where can you learn more? Go to http://ifeoma-jana.info/. Enter J367 in the search box to learn more about \"Breathing Techniques for COPD: Care Instructions. \"  Current as of: June 9, 2019  Content Version: 12.2  © 9816-9926 [employer]. Care instructions adapted under license by [employer] (which disclaims liability or warranty for this information). If you have questions about a medical condition or this instruction, always ask your healthcare professional. [place] [address] any warranty or liability for your use of this information. Patient Education        Learning About COPD Triggers  What are triggers? When you have COPD (chronic obstructive pulmonary disease), certain things can make your symptoms worse. These are called triggers. They include:  · Cigarette smoke or air pollution. · Illnesses like colds, flu, or pneumonia. · Cleaning supplies or other chemicals. · Gases, particles, or fumes from wood or kerosene home heaters. Not all people have the same triggers. What may cause symptoms in one person may not be a problem for another person. How do triggers affect COPD? Triggers can make it harder for your lungs to work as they should and can lead to sudden difficulty breathing and other symptoms. When you are around a trigger, a COPD flare-up is more likely. If your symptoms are severe, you may need emergency treatment or have to go to the hospital for treatment. If you know what your triggers are and can avoid them, you can reduce how often you have flare-ups and how much COPD affects your life. It's also important to be active and to take your daily medicines as prescribed. This helps prevent flare-ups too. What can you do to avoid triggers? The first thing is to know your triggers. When you are having symptoms, note the things around you that might be causing them. Then look for patterns in what may be triggering your symptoms. When you have your list of possible triggers, work with your doctor to find ways to avoid them. Here are some ways to avoid a few common triggers. · Do not smoke or allow others to smoke around you. If you need help quitting, talk to your doctor about stop-smoking programs and medicines. These can increase your chances of quitting for good. · If there is a lot of pollution, pollen, or dust outside, stay at home and keep your windows closed. Use an air conditioner or air filter in your home. Check your local weather report or newspaper for air quality and pollen reports. · Get a flu vaccine every year. Talk to your doctor about getting a pneumococcal shot. Wash your hands often to prevent infections. How can you manage a flare-up? Do not panic if you start to have a COPD flare-up. · If you have a COPD action plan, follow the plan. In general:  ? Use your quick-relief inhaler as directed by your doctor. If your symptoms do not get better after you use your medicine, have someone take you to the emergency room.  Call an ambulance if needed. ? Use a spacer with your metered-dose inhaler (MDI). If you have a nebulizer for inhaled medicine, use it. A spacer or nebulizer may help get more medicine to your lungs. ? If your doctor has given you other inhaled medicines or steroid pills, take them as directed. ? If your doctor has given you a prescription for an antibiotic, fill it if you need to.  ? Call your doctor if you have to use your antibiotic or steroid pills. Where can you learn more? Go to http://ifeoma-jana.info/. Enter U778 in the search box to learn more about \"Learning About COPD Triggers. \"  Current as of: June 9, 2019  Content Version: 12.2  © 1147-9057 [employer]. Care instructions adapted under license by [employer] (which disclaims liability or warranty for this information). If you have questions about a medical condition or this instruction, always ask your healthcare professional. [place] [address] any warranty or liability for your use of this information. DISCHARGE SUMMARY from Nurse    PATIENT INSTRUCTIONS:    After general anesthesia or intravenous sedation, for 24 hours or while taking prescription Narcotics:  · Limit your activities  · Do not drive and operate hazardous machinery  · Do not make important personal or business decisions  · Do  not drink alcoholic beverages  · If you have not urinated within 8 hours after discharge, please contact your surgeon on call. Report the following to your surgeon:  · Excessive pain, swelling, redness or odor of or around the surgical area  · Temperature over 100.5  · Nausea and vomiting lasting longer than 4 hours or if unable to take medications  · Any signs of decreased circulation or nerve impairment to extremity: change in color, persistent  numbness, tingling, coldness or increase pain  · Any questions    What to do at Home:  Recommended activity: Activity as tolerated.     *  Please give a list of your current medications to your Primary Care Provider. *  Please update this list whenever your medications are discontinued, doses are      changed, or new medications (including over-the-counter products) are added. *  Please carry medication information at all times in case of emergency situations. These are general instructions for a healthy lifestyle:    No smoking/ No tobacco products/ Avoid exposure to second hand smoke  Surgeon General's Warning:  Quitting smoking now greatly reduces serious risk to your health. Obesity, smoking, and sedentary lifestyle greatly increases your risk for illness    A healthy diet, regular physical exercise & weight monitoring are important for maintaining a healthy lifestyle    You may be retaining fluid if you have a history of heart failure or if you experience any of the following symptoms:  Weight gain of 3 pounds or more overnight or 5 pounds in a week, increased swelling in our hands or feet or shortness of breath while lying flat in bed. Please call your doctor as soon as you notice any of these symptoms; do not wait until your next office visit. The discharge information has been reviewed with the patient. The patient verbalized understanding. Discharge medications reviewed with the patient and appropriate educational materials and side effects teaching were provided.   ___________________________________________________________________________________________________________________________________

## 2019-11-02 NOTE — PROGRESS NOTES
OCCUPATIONAL THERAPY EVALUATION    Problem: Self Care Deficits Care Plan (Adult)  Goal: *Acute Goals and Plan of Care (Insert Text)  Description  Initial Occupational Therapy Goals (11/2/2019) Within 7 day(s):    1. Patient will perform perform grooming standing sink level for 5 minutes for increased independence in ADLs. 2. Patient will perform UB dressing with mod I stamdomg for increased independence with ADLs. 3. Patient will perform LB dressing with mod I & A/E PRN for increased independence with ADLs. 4. Patient will perform all aspects of toileting with mod I for increased independence with ADLs. 5. Patient will perform LE ADLs utilizing body mechanics & adaptive strategies with 1 verbal cue for increased safety in ADLs. 6. Patient will independently apply energy conservation techniques with 1 verbal cue(s) for increased independence with ADLs    Outcome: Progressing Towards Goal     Patient: Russ Andrade (27 y.o. male)  Date: 11/2/2019  Primary Diagnosis: Atrial fibrillation with RVR ([facility] [I48.91]  Respiratory failure ([facility] [J96.90]        Precautions:   Fall  PLOF: Using walker at home for mobility, mod I with all ADLs    ASSESSMENT :  Based on the objective data described below, the patient presents with generalized weakness and SOB following A fib and respiratory failure. Pt received supine in bed and agreeable to therapy. Able to perform bed mobility with CGA. Sitting EOB, pt unable to don/doff L sock due to reach and pain with reach, able to don R. Pt able to don/doff shorts fully with button and zipper, CGA for standing during this. Pt steady using FWW but reports not liking to use it. Mod I in bathroom mobility, performed toilet transfer with SBA/CGA. Did not need to use bathroom, able to wash hands at sink level with CGA. Pt returns to bed and remains sitting upright with bed alarms in place.      Education: Pt educated on role of OT in acute care, energy conservation techniques, don/doff sock technique. Patient will benefit from skilled intervention to address the above impairments. Patient's rehabilitation potential is considered to be Good  Factors which may influence rehabilitation potential include:   ? None noted  ? Mental ability/status  ? Medical condition  ? Home/family situation and support systems  ? Safety awareness  ? Pain tolerance/management  ? Other:      PLAN : Pt will be seen by OT to address goals, will most likely only need one visit   Recommendations and Planned Interventions:   ?               Self Care Training                  ? Therapeutic Activities  ? Functional Mobility Training   ? Cognitive Retraining  ? Therapeutic Exercises           ? Endurance Activities  ? Balance Training                    ? Neuromuscular Re-Education  ? Visual/Perceptual Training     ? Home Safety Training  ? Patient Education                   ? Family Training/Education  ? Other (comment):    Frequency/Duration: Patient will be followed by occupational therapy daily to address goals. Discharge Recommendations: Home Health  Further Equipment Recommendations for Discharge: TBD      SUBJECTIVE:   Patient stated I think I am pretty good at taking care of myself.     OBJECTIVE DATA SUMMARY:     Past Medical History:   Diagnosis Date    Cancer ([facility]     prostate    COPD [facility]ch[facility] disease) [facility]     Hypertension     Nocturia     Pneumonia     Radiation effect      Past Surgical History:   Procedure Laterality Date    HX HERNIA REPAIR       Barriers to Learning/Limitations: yes;  physical  Compensate with: visual, verbal, tactile, kinesthetic cues/model    Home Situation:   Home Situation  Home Environment: Private residence  One/Two Story Residence: One story  Living Alone: No  Support Systems: Family member(s)  Patient Expects to be Discharged to[de-identified] Private residence  Current DME Used/Available at Home: Walker  Tub or Shower Type: Tub/Shower combination  ? Right hand dominant   ? Left hand dominant    Cognitive/Behavioral Status:  Neurologic State: Alert  Orientation Level: Oriented X4  Cognition: Appropriate decision making; Appropriate safety awareness  Safety/Judgement: Awareness of environment;Good awareness of safety precautions    Skin: intact  Edema: none noted    Vision/Perceptual:    Tracking: Able to track stimulus in all quadrants w/o difficulty    Saccades: Within Defined Limits    Convergence: Normal (within 6 inches from nose)      Acuity: Within Defined Limits       Coordination: BUE  Coordination: Generally decreased, functional  Fine Motor Skills-Upper: Left Intact; Right Intact    Gross Motor Skills-Upper: Left Intact; Right Intact    Balance:  Sitting: Intact  Standing: Intact; With support    Strength: BUE  Strength: Generally decreased, functional    Tone & Sensation: BUE  Tone: Normal  Sensation: Impaired(Numbness/tingling through hands)    Range of Motion: BUE  AROM: Generally decreased, functional  PROM: Generally decreased, functional     Functional Mobility and Transfers for ADLs:  Bed Mobility:  Rolling: Contact guard assistance  Supine to Sit: Contact guard assistance  Sit to Supine: Contact guard assistance  Scooting: Contact guard assistance  Transfers:  Sit to Stand: Contact guard assistance     Toilet Transfer : Modified independent      Bathroom Mobility: Modified independent    ADL Assessment:   Feeding: Supervision    Oral Facial Hygiene/Grooming: Supervision    Bathing: Supervision    Upper Body Dressing: Stand-by assistance    Lower Body Dressing: Minimum assistance    Toileting: Minimum assistance       ADL Intervention:    Grooming  Washing Hands: Stand-by assistance;Contact guard assistance    Lower Body Dressing Assistance  Dressing Assistance: Minimum assistance  Pants With Button/Zipper: Contact guard assistance;Minimum assistance  Socks: Minimum assistance  Leg Crossed Method Used: Yes  Position Performed: Seated edge of bed    Toileting  Clothing Management: Minimum assistance    Cognitive Retraining  Orientation Retraining: Awareness of environment  Problem Solving: Awareness of environment  Executive Functions: Executing cognitive plans  Organizing/Sequencing: Breaking task down  Attention to Task: Multi-task  Maintains Attention For (Time): 10 minutes  Following Commands: Awareness of environment  Safety/Judgement: Awareness of environment;Good awareness of safety precautions  Cues: Tactile cues provided;Verbal cues provided    Pain:  Pain level pre-treatment: 6/10   Pain level post-treatment: 4/10   Pain Intervention(s): Medication provided by Nursing (see MAR); Rest,  Repositioning  Response to intervention: Nurse notified, See doc flow sheet    Activity Tolerance:   Fair. Patient able to stand 4 minute(s). Patient able to complete ADLs with rest breaks. Patient requires intermittent rest breaks. Patient limited by SOB, pain. Patient unsteady. Please refer to the flowsheet for vital signs taken during this treatment. After treatment:   ? Patient left in no apparent distress sitting up in chair  ? Patient left in no apparent distress in bed  ? Call bell left within reach  ? Nursing notified  ? Caregiver present  ? Bed alarm activated    COMMUNICATION/EDUCATION:   ? Role of Occupational Therapy in the acute care setting  ? Home safety education was provided and the patient/caregiver indicated understanding. ? Patient/family have participated as able in goal setting and plan of care. ? Patient/family agree to work toward stated goals and plan of care. ? Patient understands intent and goals of therapy, but is neutral about his/her participation. ? Patient is unable to participate in goal setting and plan of care.     Thank you for this referral.  Pamela OLIVEIRA, OTR/L  Time Calculation: 23 mins    Eval Complexity: History: MEDIUM Complexity : Expanded review of history including physical, cognitive and psychosocial  history ; Examination: MEDIUM Complexity : 3-5 performance deficits relating to physical, cognitive , or psychosocial skils that result in activity limitations and / or participation restrictions; Decision Making:MEDIUM Complexity : Patient may present with comorbidities that affect occupational performnce.  Miniml to moderate modification of tasks or assistance (eg, physical or verbal ) with assesment(s) is necessary to enable patient to complete evaluation

## 2019-11-02 NOTE — DISCHARGE SUMMARY
Discharge Summary    Patient: Kristen Diaz MRN: [id number]  CSN: [id number]    YOB: 1945  Age: 66 y.o. Sex: male    DOA: 10/30/2019 LOS:  LOS: 3 days   Discharge Date:      Primary Care Provider:  Karley Rae MD    Admission Diagnoses: Atrial fibrillation with RVR ([facility] [I48.91]  Respiratory failure ([facility] [J96.90]    Discharge Diagnoses:    Hospital Problems  Date Reviewed: 7/6/2019          Codes Class Noted POA    * (Principal) Acute on chronic respiratory failure with hypoxia and hypercapnia [facility] ICD-10-CM: J96.21, J96.22  ICD-9-CM: 518.84, 786.09, 799.02  10/30/2019         Atrial fibrillation with RVR ([facility] ICD-10-CM: I48.91  ICD-9-CM: 427.31  10/30/2019 Unknown        Acute exacerbation of chronic obstructive pulmonary disease (COPD) ([facility] ICD-10-CM: J44.1  ICD-9-CM: 491.21  2/8/2019 Yes        Asbestosis ([facility] ICD-10-CM: J23  ICD-9-CM: 658  10/19/2018 Yes        Hypertension ICD-10-CM: I10  ICD-9-CM: 401.9  Unknown Yes              Discharge Condition: stable     Discharge Medications:     Current Discharge Medication List      START taking these medications    Details   dilTIAZem (CARDIZEM) 30 mg tablet Take 1 Tab by mouth Before breakfast, lunch, and dinner. Qty: 90 Tab, Refills: 0      apixaban (ELIQUIS) 5 mg tablet Take 1 Tab by mouth two (2) times a day. Qty: 60 Tab, Refills: 0      arformoterol (BROVANA) 15 mcg/2 mL nebu neb solution 2 mL by Nebulization route two (2) times a day for 30 days. Qty: 60 Vial, Refills: 0      budesonide (PULMICORT) 0.5 mg/2 mL nbsp 2 mL by Nebulization route two (2) times a day. Qty: 60 Each, Refills: 0      levoFLOXacin (LEVAQUIN) 500 mg tablet Take 1 Tab by mouth daily for 5 days. Qty: 5 Tab, Refills: 0         CONTINUE these medications which have NOT CHANGED    Details   acetaminophen (TYLENOL) 325 mg tablet Take 500 mg by mouth every four (4) hours as needed for Pain.       predniSONE (DELTASONE) 10 mg tablet Prednisone 10mg tabs: p.o.  4 tabs daily for 2 days then drop to   3 tabs daily for 2 days then drop to   2 tabs daily for 2 days then drop to   1 tab daily for 2 days then stop. Dispense 20 tabs  Qty: 20 Tab, Refills: 0      OXYGEN-AIR DELIVERY SYSTEMS Take 2 L by inhalation continuous. dextran 70/hypromellose (ARTIFICIAL TEARS, PF, OP) Apply 2 Drops to eye as needed for Other (both eyes for dryness). diphenhydrAMINE (BENADRYL) 25 mg capsule Take 25 mg by mouth nightly as needed for Itching. albuterol-ipratropium (DUO-NEB) 2.5 mg-0.5 mg/3 ml nebu 3 mL by Nebulization route every four (4) hours as needed. Qty: 30 Nebule, Refills: 0      albuterol (PROVENTIL HFA, VENTOLIN HFA, PROAIR HFA) 90 mcg/actuation inhaler Take 2 Puffs by inhalation every four (4) hours as needed for Wheezing or Shortness of Breath. Qty: 1 Inhaler, Refills: 1      ipratropium (ATROVENT) 0.03 % nasal spray 2 Sprays by Both Nostrils route every twelve (12) hours as needed for Rhinitis. tamsulosin (FLOMAX) 0.4 mg capsule Take 0.4 mg by mouth every evening. STOP taking these medications       ibuprofen (ADVIL) 100 mg tablet Comments:   Reason for Stopping:         fluticasone propionate (FLONASE) 50 mcg/actuation nasal spray Comments:   Reason for Stopping:         chlorthalidone (HYGROTEN) 25 mg tablet Comments:   Reason for Stopping:               Procedures : none     Consults: Cardiology and Pulmonary/Critical Care      PHYSICAL EXAM   Visit Vitals  /75 (BP 1 Location: Left arm, BP Patient Position: At rest)   Pulse 61   Temp 97.5 °F (36.4 °C)   Resp 18   Ht 6' (1.829 m)   Wt 93.2 kg (205 lb 8 oz)   SpO2 99%   BMI 27.87 kg/m²     General: Awake, cooperative, no acute distress    HEENT: NC, Atraumatic. PERRLA, EOMI. Anicteric sclerae. Lungs:  CTA Bilaterally. No Wheezing/Rhonchi/Rales. Heart:  Regular  rhythm,  +murmur, No Rubs, No Gallops  Abdomen: Soft, Non distended, Non tender.  +Bowel sounds,   Extremities: No c/c/e  Psych:   Not anxious or agitated. Neurologic:  No acute neurological deficits. Admission HPI :   Liat Coburn is a 66 y.o. male who with past medical history of COPD, on home oxygen 2 L, asbestosis, PAF was sent to ER per family due to shortness of breath for 1 day. Per family reported, he was seen to the bathroom and asking for help due to cannot breathe. First ABG pH was 7.188, CO2 79. BiPAP was put on. IV steroid and breathing treatment were performed in ER. Repeated ABG mild improving. A. fib with RVR was notified on EKG. 30 mg Cardizem drip was given in ER, heart rate got some responded but still elevated. Cardizem drip was started in the ER, have to titrated from 5 to 10-15. He was seen in ER. wife and grandchildren were at bedside. Wife said his  has been suffering for breathing issue, he is afraid of going out due to shortness of breath. No chest pain reported.        Admission and treatment discussed with patient and family, all agree and indicated a verbal understanding     Hospital Course :   Liat Coburn is a 66 y.o. male who with past medical history of COPD, on home oxygen 2 L, asbestosis, PAF was admitted for acute on chronic respiratory failure. bipap was put on, iv steroid, levoquin were administrated for copd exacerbation. cta chest no PE indicated. He has paf , his afib flared up and required cardizem. Heparin gtt were administrated for anticoagulant. With the treatment, he was able to wean off bipap, and doing well on his home 2Lnc O2. His heart rate was controlled per cardizem. eliquis was started for anticoagulant. He was cleared to be d/c per cardiologist and pulmonologist.   Palliative care was on board and still keep full code. Discharge planning discussed with patient and family, agree with the plan and no questions and concerns at this point.        Activity: Activity as tolerated    Diet: Cardiac Diet     Follow-up: PCP, Dr. Cady Pickard and Dr. Rachel Olmos     Disposition: rehab     Minutes spent on discharge: 45 min       Labs: Results:       Chemistry Recent Labs     11/02/19  0210 11/01/19  0440 10/31/19  0130 10/30/19  1505   * 144* 153* 154*   * 136 137 137   K 4.1 4.2 5.2 4.9    102 102 103   CO2 26 26 24 30   BUN 45* 45* 38* 30*   CREA 1.10 1.18 1.33* 1.24   CA 8.0* 8.4* 8.3* 8.1*   AGAP 8 8 11 4   BUCR 41* 38* 29* 24*   AP  --   --   --  80   TP  --   --   --  6.4   ALB  --   --   --  3.7   GLOB  --   --   --  2.7   AGRAT  --   --   --  1.4      CBC w/Diff Recent Labs     11/02/19  0210 11/01/19  0440 10/31/19  0130   WBC 13.4* 16.8* 16.7*   RBC 3.50* 3.49* 3.72*   HGB 10.4* 10.3* 11.0*   HCT 31.6* 31.3* 33.8*    231 249   GRANS 89* 96* 92*   LYMPH 5* 2* 3*   EOS 0 0 0      Cardiac Enzymes No results for input(s): CPK, CKND1, WILLARD in the last 72 hours. No lab exists for component: CKRMB, TROIP   Coagulation Recent Labs     11/01/19  0440 10/31/19  1712   APTT 110.0* 106.4*       Lipid Panel No results found for: CHOL, CHOLPOCT, CHOLX, CHLST, CHOLV, 269502, HDL, HDLP, LDL, LDLC, DLDLP, 676132, VLDLC, VLDL, TGLX, TRIGL, TRIGP, TGLPOCT, CHHD, CHHDX   BNP No results for input(s): BNPP in the last 72 hours. Liver Enzymes Recent Labs     10/30/19  1505   TP 6.4   ALB 3.7   AP 80   SGOT 10      Thyroid Studies Lab Results   Component Value Date/Time    TSH 0.86 10/20/2019 01:05 AM            Significant Diagnostic Studies: Cta Chest W Or W Wo Cont    Result Date: 10/31/2019  EXAM: CTA Chest INDICATION: Acute on chronic respiratory failure with hypoxia and hypercapnia. Evaluation for potential embolism. COMPARISON: Correlation made with prior CT scan of the chest obtained July 7, 2019. Correlation made with multiple prior radiographs, most recent 10/30/2019 TECHNIQUE: Axial CT imaging from the thoracic inlet through the diaphragm with intravenous contrast utilizing CTA study for pulmonary artery evaluation. Coronal and sagittal MIP reformations were generated at a separate workstation. One or more dose reduction techniques were used on this CT: automated exposure control, adjustment of the mAs and/or kVp according to patient size, and iterative reconstruction techniques. The specific techniques used on this CT exam have been documented in the patient's electronic medical record. Digital Imaging and Communications in Medicine (DICOM) format image data are available to nonaffiliated external healthcare facilities or entities on a secure, media free, reciprocally searchable basis with patient authorization for at least a 12-month period after this study. _______________ FINDINGS: EXAM QUALITY: Overall exam quality is adequate. Pulmonary arterial enhancement is adequate. The breath hold is satisfactory. PULMONARY ARTERIES: No convincing evidence of pulmonary embolism. MEDIASTINUM: Included thyroid gland contains several small hypodensities which are too small to require further characterization. Great vessels are of normal caliber. Thoracic aorta normal in course and caliber. Normal cardiac size. No pericardial effusion. Small quantity of fluid within the superior pericardial recess. LYMPH NODES: No enlarged mediastinal or hilar nodes by size criteria. AIRWAY: Central airways are patent. Small quantity of endobronchial secretions noted at the distal right mainstem bronchus. No discrete foci of endobronchial mucoid impaction. LUNGS: There are basilar areas of atelectasis which involve the lower lobes bilaterally. Significantly improved aeration of the left lower lobe noted in the interval from prior CT scan. No significant groundglass abnormality or septal line thickening. No alveolar consolidation.  PLEURA: As previously, elliptical morphology peripherally calcified structures are noted within the anterior aspect of the right pleural space with additional posterior right hemithoracic peripherally calcified pleural space collection redemonstrated. This appears unchanged from examination of March 19, 2017 and demonstrates radiographic stability from 2014. No evidence of pneumothorax or pleural effusion. UPPER ABDOMEN: Small hiatal hernia. Multiple punctate granulomata throughout the liver. No acute abnormality present. . OTHER: No acute or aggressive osseous abnormalities identified. _______________     IMPRESSION: 1. No evidence of pulmonary embolism. 2.  Basilar areas of atelectasis without findings to suggest pneumonia, pulmonary edema, or pleural effusion. 3. 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 4. Small hiatal hernia. Xr Chest Port    Result Date: 10/30/2019  EXAM: XR CHEST PORT CLINICAL INDICATION/HISTORY: Respiratory distress -Additional: None COMPARISON: Several prior exams, most recently chest radiograph dated 10/20/2019 TECHNIQUE: Frontal view of the chest _______________ FINDINGS: HEART AND MEDIASTINUM: Normal cardiac size and mediastinal contours. LUNGS AND PLEURAL SPACES: Elevated right hemidiaphragm and volume loss in the right hemithorax unchanged from prior examination. No focal pneumonic opacity. No pneumothorax or pleural effusion. Partially peripherally calcified pleural-based lesion projecting over the right chest wall unchanged from multiple prior examinations. The linear opacities at each lung base are similar in appearance to prior. Improved aeration of the right lung base from recent radiographs. BONY THORAX AND SOFT TISSUES: No acute osseous abnormality _______________     IMPRESSION: 1. No acute radiographic cardiopulmonary abnormality. Unchanged basilar areas of scarring or atelectasis.      Xr Chest Port    Result Date: 10/20/2019  EXAM: XR CHEST PORT CLINICAL INDICATION/HISTORY: Shortness of Breath -Additional: Emphysema COMPARISON: Several prior exams, most recently 10/19/2019. TECHNIQUE: Frontal view of the chest _______________ FINDINGS: HEART AND MEDIASTINUM: Stable cardiac size and mediastinal contours. LUNGS AND PLEURAL SPACES: Elevated right hemidiaphragm redemonstrated, as previously. Elliptical, peripherally calcified, pleural-based density over the right mid thorax unchanged from multiple prior exams. Right basilar atelectasis or scarring without change. Left lung mildly hyperinflated and clear. No pneumothorax or pleural effusion. BONY THORAX AND SOFT TISSUES: No acute osseous abnormality _______________     IMPRESSION: 1. Overall stable examination with chronically elevated right hemidiaphragm and adjacent right basilar atelectasis. Xr Chest Port    Result Date: 10/19/2019  EXAM: XR CHEST PORT CLINICAL INDICATION/HISTORY: sob -Additional: None COMPARISON: Multiple prior exams, most recently 9/6/2019 TECHNIQUE: PA and lateral views of the chest _______________ FINDINGS: HEART AND MEDIASTINUM: Stable appearing cardiac size and mediastinal contours. LUNGS AND PLEURAL SPACES: No focal pneumonic opacity. No pneumothorax or pleural effusion. Unchanged subsegmental atelectasis at the right lung base. Elliptical/peripherally calcified pleural-based density within the right mid hemithorax unchanged from multiple prior exams. BONY THORAX AND SOFT TISSUES: No acute osseous abnormality _______________     IMPRESSION: No acute radiographic cardiopulmonary abnormality or significant interval change from prior.             [facility]     CC: Neymar Sebastian MD

## 2019-11-02 NOTE — PROGRESS NOTES
Problem: Chronic Obstructive Pulmonary Disease (COPD)  Goal: *Oxygen saturation during activity within specified parameters  Outcome: Progressing Towards Goal  Note:   Needs reminders to breathe through nose. Goal: *Absence of hypoxia  Outcome: Progressing Towards Goal     Problem: Falls - Risk of  Goal: *Absence of Falls  Description  Document Samantha [facility] Fall Risk and appropriate interventions in the flowsheet.   Outcome: Progressing Towards Goal  Note:   Pt calls for assistance  Fall Risk Interventions:  Mobility Interventions: Communicate number of staff needed for ambulation/transfer         Medication Interventions: Teach patient to arise slowly    Elimination Interventions: Call light in reach

## 2019-11-03 PROCEDURE — 3331090002 HH PPS REVENUE DEBIT

## 2019-11-03 PROCEDURE — 3331090001 HH PPS REVENUE CREDIT

## 2019-11-04 ENCOUNTER — PATIENT OUTREACH (OUTPATIENT)
Dept: CASE MANAGEMENT | Age: 74
End: 2019-11-04

## 2019-11-04 PROCEDURE — 3331090001 HH PPS REVENUE CREDIT

## 2019-11-04 PROCEDURE — 3331090002 HH PPS REVENUE DEBIT

## 2019-11-04 NOTE — PROGRESS NOTES
Transitions of Care     Notification received of patient's diacharge from [facility]   11/2/19. Care Transitions Nurse to follow up with staff with [facility] on another day. Per Chart Review:       Transition of Care Coordination/Hospital to Post Acute Facility:     Date/Time:  11/4/2019 4:05 PM    Patient was admitted to [facility] on:  10-30-19  for treatment of:  Acute on Chronic Respiratory Failure with hypoxia and hypercapnia  ( principal). * Per Discharge Summary of 11/2/19. Please see Discharge Summary of 11/2/19 for additional discharge diagnoses. Patient was discharged 11/2/19  to [facility] for continuation of care. Inpatient RRAT score: 28      ACP:   Does the patient have a current ACP (including DDNR):  yes  Does the post acute facility have a copy of the patients ACP:  To Be Determined. Medication(s):   New Medications at Discharge:   START taking these medications     Details   dilTIAZem (CARDIZEM) 30 mg tablet Take 1 Tab by mouth Before breakfast, lunch, and dinner. Qty: 90 Tab, Refills: 0       apixaban (ELIQUIS) 5 mg tablet Take 1 Tab by mouth two (2) times a day. Qty: 60 Tab, Refills: 0       arformoterol (BROVANA) 15 mcg/2 mL nebu neb solution 2 mL by Nebulization route two (2) times a day for 30 days. Qty: 60 Vial, Refills: 0       budesonide (PULMICORT) 0.5 mg/2 mL nbsp 2 mL by Nebulization route two (2) times a day. Qty: 60 Each, Refills: 0       levoFLOXacin (LEVAQUIN) 500 mg tablet Take 1 Tab by mouth daily for 5 days.   Qty: 5 Tab, Refills: 0       Changed Medications at Discharge:   None noted       Discontinued Medications at Discharge:     STOP taking these medications         ibuprofen (ADVIL) 100 mg tablet Comments:   Reason for Stopping:            fluticasone propionate (FLONASE) 50 mcg/actuation nasal spray Comments:   Reason for Stopping:            chlorthalidone (HYGROTEN) 25 mg tablet Comments: Reason for Stopping:                 PCP/Specialist follow up: No future appointments.      PCP   Dr. Geoffrey Chester

## 2019-11-05 ENCOUNTER — PATIENT OUTREACH (OUTPATIENT)
Dept: CASE MANAGEMENT | Age: 74
End: 2019-11-05

## 2019-11-05 PROCEDURE — 3331090001 HH PPS REVENUE CREDIT

## 2019-11-05 PROCEDURE — 3331090002 HH PPS REVENUE DEBIT

## 2019-11-05 NOTE — PROGRESS NOTES
Transitions of Care     Care Transitions Nurse ( CTN) attempted to contact [facility] staff for patient follow up. The phone rang and there was no answer. CTN to attempt to contact facility staff on another day or at another time for follow up. CTN called [facility] again and was asked to please call back as the nurses are giving report at this time. CTN to follow up on another day.

## 2019-11-06 PROCEDURE — 3331090002 HH PPS REVENUE DEBIT

## 2019-11-06 PROCEDURE — 3331090001 HH PPS REVENUE CREDIT

## 2019-11-07 ENCOUNTER — PATIENT OUTREACH (OUTPATIENT)
Dept: CASE MANAGEMENT | Age: 74
End: 2019-11-07

## 2019-11-07 PROCEDURE — 3331090002 HH PPS REVENUE DEBIT

## 2019-11-07 PROCEDURE — 3331090001 HH PPS REVENUE CREDIT

## 2019-11-07 NOTE — Clinical Note
Mrs. Nidia Baker:  I have not been able to contact staff members with [facility] for follow up. Thank you [facility] Transitions Nurse.

## 2019-11-07 NOTE — PROGRESS NOTES
Transitions of Care     Care Transitions Nurse ( CTN ) called [address] [place] [id number] and requested to speak with patient's nurse or the unit ream leader. CTN was put on hold but was unable to speak with a unit staff member again. Care Transitions Nurse has attempted contact staff with [facility] x 3 and unable to speak with staff regarding patient transitions of care. Chart routed to [facility] Manager.

## 2019-11-08 ENCOUNTER — PATIENT OUTREACH (OUTPATIENT)
Dept: CASE MANAGEMENT | Age: 74
End: 2019-11-08

## 2019-11-08 PROCEDURE — 3331090001 HH PPS REVENUE CREDIT

## 2019-11-08 PROCEDURE — 3331090002 HH PPS REVENUE DEBIT

## 2019-11-08 NOTE — PROGRESS NOTES
Community Care Team Documentation for Patient in [facility]     Patient discharged from [address] to [address] [place] [address], on 11/2/19. Hospital Discharge diagnosis:       Acute on chronic Resp Failure with Hypoxia and hypercapnia   Atrial fibrillation with RVR   COPD   Asbestosis   HTN    SNF Attending Provider:      Anticipated discharge date from SNF:  May d/c as early as next week - 11/14/19    PCP : Keegan Olmos MD    Nurse Navigator: Rito Faith Team rounds completed, updates provided by facility. Brief Summary of Care:    Patient receiving PT/OT. On Oxygen 2l/min. Doing well. Some L hip pain - has ortho appt next week. Dispo:  Has O2 at home, lives with wife. Will use [id number] . May d/c next week (14th). RRAT:  High Risk            23       Total Score        3 Has Seen PCP in Last 6 Months (Yes=3, No=0)    11 IP Visits Last 12 Months (1-3=4, 4=9, >4=11)    9 Charlson Comorbidity Score (Age + Comorbid Conditions)        Criteria that do not apply:    . Living with Significant Other. Assisted Living. LTAC. SNF. or   Rehab    Patient Length of Stay (>5 days = 3)    Pt.  Coverage (Medicare=5 , Medicaid, or Self-Pay=4)        Active Ambulatory Problems     Diagnosis Date Noted    Hypertension     COPD (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 ([address] 04/20/2018    Chronic renal disease, stage 3, moderately decreased glomerular filtration rate between 30-59 mL/min/1.73 square meter ([address] 07/20/2018    Asbestosis ([address] 10/19/2018    Acute exacerbation of chronic obstructive pulmonary disease (COPD) ([address] 02/08/2019    Advanced care planning/counseling discussion 07/08/2019    Debility 07/08/2019    Pneumonia of right lower lobe due to methicillin susceptible Staphylococcus aureus (MSSA) ([address] 08/19/2019    Paroxysmal atrial fibrillation (HCC) 08/19/2019    Chronic respiratory failure with hypoxia (HCC) 08/19/2019    COPD (chronic obstructive pulmonary disease) ([facility] 10/19/2019    Acute on chronic respiratory failure with hypoxia and hypercapnia (HCC) 10/30/2019    Atrial fibrillation with RVR ([facility] 10/30/2019     Resolved Ambulatory Problems     Diagnosis Date Noted    Acute respiratory failure ([facility] 10/02/2014    URIEL (acute kidney injury) ([facility] 10/02/2014    Pneumonia 10/02/2014    Status asthmaticus with COPD (chronic obstructive pulmonary disease) ([facility] 04/20/2018    PVC's (premature ventricular contractions) 04/20/2018    COPD with asthma and status asthmaticus ([facility] 07/20/2018     Past Medical History:   Diagnosis Date    Cancer ([facility]     Nocturia     Radiation effect

## 2019-11-09 PROCEDURE — 3331090001 HH PPS REVENUE CREDIT

## 2019-11-09 PROCEDURE — 3331090002 HH PPS REVENUE DEBIT

## 2019-11-10 PROCEDURE — 3331090001 HH PPS REVENUE CREDIT

## 2019-11-10 PROCEDURE — 3331090002 HH PPS REVENUE DEBIT

## 2019-11-11 PROCEDURE — 3331090001 HH PPS REVENUE CREDIT

## 2019-11-11 PROCEDURE — 3331090002 HH PPS REVENUE DEBIT

## 2019-11-12 PROCEDURE — 3331090002 HH PPS REVENUE DEBIT

## 2019-11-12 PROCEDURE — 3331090001 HH PPS REVENUE CREDIT

## 2019-11-13 PROCEDURE — 3331090001 HH PPS REVENUE CREDIT

## 2019-11-13 PROCEDURE — 3331090002 HH PPS REVENUE DEBIT

## 2019-11-14 ENCOUNTER — PATIENT OUTREACH (OUTPATIENT)
Dept: CASE MANAGEMENT | Age: 74
End: 2019-11-14

## 2019-11-14 PROCEDURE — 3331090001 HH PPS REVENUE CREDIT

## 2019-11-14 PROCEDURE — 3331090002 HH PPS REVENUE DEBIT

## 2019-11-14 NOTE — PROGRESS NOTES
Community Care Team Documentation for Patient in [facility]     Patient discharged from [address] to [address] [place] [address], on 11/2/19. Hospital Discharge diagnosis:       Acute on chronic Resp Failure with Hypoxia and hypercapnia   Atrial fibrillation with RVR   COPD   Asbestosis   HTN    SNF Attending Provider:      Anticipated discharge date from SNF:  11/16/19 with [facility]    PCP : Karey Tejada MD    Nurse Navigator: Rito Harmon Team rounds completed, updates provided by facility. Brief Summary of Care:    Patient receiving PT/OT. On Oxygen 2l/min. Doing well. Some L hip pain - has ortho appt next week. Dispo:  Has O2 at home, lives with wife. Will use [facility] . To d/c 11/16/19    RRAT:  High Risk            23       Total Score        3 Has Seen PCP in Last 6 Months (Yes=3, No=0)    11 IP Visits Last 12 Months (1-3=4, 4=9, >4=11)    9 Charlson Comorbidity Score (Age + Comorbid Conditions)        Criteria that do not apply:    . Living with Significant Other. Assisted Living. LTAC. SNF. or   Rehab    Patient Length of Stay (>5 days = 3)    Pt.  Coverage (Medicare=5 , Medicaid, or Self-Pay=4)        Active Ambulatory Problems     Diagnosis Date Noted    Hypertension     COPD (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 ([address] 04/20/2018    Chronic renal disease, stage 3, moderately decreased glomerular filtration rate between 30-59 mL/min/1.73 square meter ([address] 07/20/2018    Asbestosis ([address] 10/19/2018    Acute exacerbation of chronic obstructive pulmonary disease (COPD) ([address] 02/08/2019    Advanced care planning/counseling discussion 07/08/2019    Debility 07/08/2019    Pneumonia of right lower lobe due to methicillin susceptible Staphylococcus aureus (MSSA) ([address] 08/19/2019    Paroxysmal atrial fibrillation (HCC) 08/19/2019    Chronic respiratory failure with hypoxia ([facility] 08/19/2019    COPD (chronic obstructive pulmonary disease) ([facility] 10/19/2019    Acute on chronic respiratory failure with hypoxia and hypercapnia (HCC) 10/30/2019    Atrial fibrillation with RVR ([facility] 10/30/2019     Resolved Ambulatory Problems     Diagnosis Date Noted    Acute respiratory failure ([facility] 10/02/2014    URIEL (acute kidney injury) ([facility] 10/02/2014    Pneumonia 10/02/2014    Status asthmaticus with COPD (chronic obstructive pulmonary disease) ([facility] 04/20/2018    PVC's (premature ventricular contractions) 04/20/2018    COPD with asthma and status asthmaticus ([facility] 07/20/2018     Past Medical History:   Diagnosis Date    Cancer ([facility]     Nocturia     Radiation effect

## 2019-11-15 PROCEDURE — 3331090002 HH PPS REVENUE DEBIT

## 2019-11-15 PROCEDURE — 3331090001 HH PPS REVENUE CREDIT

## 2019-11-16 PROCEDURE — 3331090002 HH PPS REVENUE DEBIT

## 2019-11-16 PROCEDURE — 3331090001 HH PPS REVENUE CREDIT

## 2019-11-17 ENCOUNTER — HOME CARE VISIT (OUTPATIENT)
Dept: HOME HEALTH SERVICES | Facility: HOME HEALTH | Age: 74
End: 2019-11-17
Payer: MEDICARE

## 2019-11-17 PROCEDURE — 3331090002 HH PPS REVENUE DEBIT

## 2019-11-17 PROCEDURE — 3331090001 HH PPS REVENUE CREDIT

## 2019-11-18 PROCEDURE — 3331090001 HH PPS REVENUE CREDIT

## 2019-11-18 PROCEDURE — 3331090002 HH PPS REVENUE DEBIT

## 2019-11-19 ENCOUNTER — HOME HEALTH ADMISSION (OUTPATIENT)
Dept: HOME HEALTH SERVICES | Facility: HOME HEALTH | Age: 74
End: 2019-11-19

## 2019-11-19 ENCOUNTER — HOME CARE VISIT (OUTPATIENT)
Dept: HOME HEALTH SERVICES | Facility: HOME HEALTH | Age: 74
End: 2019-11-19
Payer: MEDICARE

## 2020-01-11 ENCOUNTER — HOSPITAL ENCOUNTER (EMERGENCY)
Age: 75
Discharge: HOME | End: 2020-01-11
Attending: EMERGENCY MEDICINE
Payer: MEDICARE

## 2020-01-11 ENCOUNTER — APPOINTMENT (OUTPATIENT)
Dept: GENERAL RADIOLOGY | Age: 75
End: 2020-01-11
Attending: EMERGENCY MEDICINE
Payer: MEDICARE

## 2020-01-11 VITALS
DIASTOLIC BLOOD PRESSURE: 81 MMHG | BODY MASS INDEX: 30.31 KG/M2 | RESPIRATION RATE: 18 BRPM | WEIGHT: 200 LBS | OXYGEN SATURATION: 94 % | TEMPERATURE: 98.5 F | HEIGHT: 68 IN | SYSTOLIC BLOOD PRESSURE: 142 MMHG | HEART RATE: 93 BPM

## 2020-01-11 DIAGNOSIS — J44.1 CHRONIC OBSTRUCTIVE PULMONARY DISEASE WITH ACUTE EXACERBATION (HCC): Primary | ICD-10-CM

## 2020-01-11 LAB
ALBUMIN SERPL-MCNC: 2.7 G/DL (ref 3.4–5)
ALBUMIN/GLOB SERPL: 0.8 {RATIO} (ref 0.8–1.7)
ALP SERPL-CCNC: 152 U/L (ref 45–117)
ALT SERPL-CCNC: 19 U/L (ref 16–61)
ANION GAP SERPL CALC-SCNC: 10 MMOL/L (ref 3–18)
AST SERPL-CCNC: 17 U/L (ref 10–38)
BASOPHILS # BLD: 0.1 K/UL (ref 0–0.1)
BASOPHILS NFR BLD: 1 % (ref 0–2)
BILIRUB SERPL-MCNC: 0.4 MG/DL (ref 0.2–1)
BUN SERPL-MCNC: 17 MG/DL (ref 7–18)
BUN/CREAT SERPL: 16 (ref 12–20)
CALCIUM SERPL-MCNC: 8.5 MG/DL (ref 8.5–10.1)
CHLORIDE SERPL-SCNC: 97 MMOL/L (ref 100–111)
CK MB CFR SERPL CALC: 3.4 % (ref 0–4)
CK MB SERPL-MCNC: 3.3 NG/ML (ref 5–25)
CK SERPL-CCNC: 96 U/L (ref 39–308)
CO2 SERPL-SCNC: 23 MMOL/L (ref 21–32)
CREAT SERPL-MCNC: 1.08 MG/DL (ref 0.6–1.3)
DIFFERENTIAL METHOD BLD: ABNORMAL
EOSINOPHIL # BLD: 1.3 K/UL (ref 0–0.4)
EOSINOPHIL NFR BLD: 11 % (ref 0–5)
ERYTHROCYTE [DISTWIDTH] IN BLOOD BY AUTOMATED COUNT: 13 % (ref 11.6–14.5)
GLOBULIN SER CALC-MCNC: 3.6 G/DL (ref 2–4)
GLUCOSE SERPL-MCNC: 108 MG/DL (ref 74–99)
HCT VFR BLD AUTO: 29.5 % (ref 36–48)
HGB BLD-MCNC: 9.8 G/DL (ref 13–16)
LYMPHOCYTES # BLD: 1 K/UL (ref 0.9–3.6)
LYMPHOCYTES NFR BLD: 8 % (ref 21–52)
MCH RBC QN AUTO: 28.4 PG (ref 24–34)
MCHC RBC AUTO-ENTMCNC: 33.2 G/DL (ref 31–37)
MCV RBC AUTO: 85.5 FL (ref 74–97)
MONOCYTES # BLD: 1.1 K/UL (ref 0.05–1.2)
MONOCYTES NFR BLD: 9 % (ref 3–10)
NEUTS SEG # BLD: 8.6 K/UL (ref 1.8–8)
NEUTS SEG NFR BLD: 71 % (ref 40–73)
PLATELET # BLD AUTO: 391 K/UL (ref 135–420)
PMV BLD AUTO: 8.6 FL (ref 9.2–11.8)
POTASSIUM SERPL-SCNC: 4 MMOL/L (ref 3.5–5.5)
PROT SERPL-MCNC: 6.3 G/DL (ref 6.4–8.2)
RBC # BLD AUTO: 3.45 M/UL (ref 4.7–5.5)
SODIUM SERPL-SCNC: 130 MMOL/L (ref 136–145)
TROPONIN I SERPL-MCNC: <0.02 NG/ML (ref 0–0.04)
WBC # BLD AUTO: 12 K/UL (ref 4.6–13.2)

## 2020-01-11 PROCEDURE — 71045 X-RAY EXAM CHEST 1 VIEW: CPT

## 2020-01-11 PROCEDURE — 96375 TX/PRO/DX INJ NEW DRUG ADDON: CPT

## 2020-01-11 PROCEDURE — 99285 EMERGENCY DEPT VISIT HI MDM: CPT

## 2020-01-11 PROCEDURE — 96365 THER/PROPH/DIAG IV INF INIT: CPT

## 2020-01-11 PROCEDURE — 94640 AIRWAY INHALATION TREATMENT: CPT

## 2020-01-11 PROCEDURE — 85025 COMPLETE CBC W/AUTO DIFF WBC: CPT

## 2020-01-11 PROCEDURE — 74011000250 HC RX REV CODE- 250: Performed by: EMERGENCY MEDICINE

## 2020-01-11 PROCEDURE — 82550 ASSAY OF CK (CPK): CPT

## 2020-01-11 PROCEDURE — 74011250636 HC RX REV CODE- 250/636: Performed by: EMERGENCY MEDICINE

## 2020-01-11 PROCEDURE — 93005 ELECTROCARDIOGRAM TRACING: CPT

## 2020-01-11 PROCEDURE — 80053 COMPREHEN METABOLIC PANEL: CPT

## 2020-01-11 RX ORDER — IPRATROPIUM BROMIDE AND ALBUTEROL SULFATE 2.5; .5 MG/3ML; MG/3ML
3 SOLUTION RESPIRATORY (INHALATION)
Status: COMPLETED | OUTPATIENT
Start: 2020-01-11 | End: 2020-01-11

## 2020-01-11 RX ORDER — IPRATROPIUM BROMIDE AND ALBUTEROL SULFATE 2.5; .5 MG/3ML; MG/3ML
SOLUTION RESPIRATORY (INHALATION)
Status: DISPENSED
Start: 2020-01-11 | End: 2020-01-12

## 2020-01-11 RX ORDER — AZITHROMYCIN 250 MG/1
TABLET, FILM COATED ORAL
Qty: 6 TAB | Refills: 0 | Status: SHIPPED | OUTPATIENT
Start: 2020-01-11 | End: 2020-01-16

## 2020-01-11 RX ORDER — MAGNESIUM SULFATE HEPTAHYDRATE 40 MG/ML
2 INJECTION, SOLUTION INTRAVENOUS ONCE
Status: COMPLETED | OUTPATIENT
Start: 2020-01-11 | End: 2020-01-11

## 2020-01-11 RX ORDER — PREDNISONE 50 MG/1
50 TABLET ORAL DAILY
Qty: 4 TAB | Refills: 0 | Status: SHIPPED | OUTPATIENT
Start: 2020-01-11 | End: 2020-01-15

## 2020-01-11 RX ORDER — MAGNESIUM SULFATE HEPTAHYDRATE 40 MG/ML
INJECTION, SOLUTION INTRAVENOUS
Status: DISPENSED
Start: 2020-01-11 | End: 2020-01-12

## 2020-01-11 RX ADMIN — IPRATROPIUM BROMIDE AND ALBUTEROL SULFATE 3 ML: .5; 3 SOLUTION RESPIRATORY (INHALATION) at 15:40

## 2020-01-11 RX ADMIN — METHYLPREDNISOLONE SODIUM SUCCINATE 125 MG: 125 INJECTION, POWDER, FOR SOLUTION INTRAMUSCULAR; INTRAVENOUS at 13:59

## 2020-01-11 RX ADMIN — MAGNESIUM SULFATE HEPTAHYDRATE 2 G: 40 INJECTION, SOLUTION INTRAVENOUS at 15:45

## 2020-01-11 RX ADMIN — IPRATROPIUM BROMIDE AND ALBUTEROL SULFATE 3 ML: .5; 3 SOLUTION RESPIRATORY (INHALATION) at 13:59

## 2020-01-11 NOTE — ED TRIAGE NOTES
Patient ambulate to ED with increased shortness of breath and cough over 2-3 weeks, hx COPD, audible wheezing, patient has productive yellow/grren sputum

## 2020-01-11 NOTE — ED PROVIDER NOTES
EMERGENCY DEPARTMENT HISTORY AND PHYSICAL EXAM    Date: 1/11/2020  Patient Name: Kevin Alarcon    History of Presenting Illness     Chief Complaint   Patient presents with    Shortness of Breath         History Provided By: Patient     History Miguel Rincon):   1:44 PM  Kevin Alarcon is a 66 y.o. male with PMHX of Brain aneurysm, prostate cancer, COPD, hypertension, nocturia, pneumonia who presents to the emergency department C/O dyspnea onset 2 weeks ago, but worse over the last 2 days. Associated sxs include cough. Pt denies fevers, chills or any other sxs or complaints. Chief Complaint: dyspnea  Duration: 2 days   Timing:  acute  Location: chest  Quality: Tightness  Severity: Severe  Modifying Factors: Nothing makes it better, activity makes it worse. Associated Symptoms: cough    PCP: Yonatan Myers MD     Current Outpatient Medications   Medication Sig Dispense Refill    predniSONE (DELTASONE) 50 mg tablet Take 1 Tab by mouth daily for 4 days. First dose 12 Jan 2020 4 Tab 0    azithromycin (ZITHROMAX Z-CARLOS ENRIQUE) 250 mg tablet Take 2 tabs po on day 1, then 1 tab po days 2-5 6 Tab 0    dilTIAZem (CARDIZEM) 30 mg tablet Take 1 Tab by mouth Before breakfast, lunch, and dinner. 90 Tab 0    apixaban (ELIQUIS) 5 mg tablet Take 1 Tab by mouth two (2) times a day. 60 Tab 0    budesonide (PULMICORT) 0.5 mg/2 mL nbsp 2 mL by Nebulization route two (2) times a day. 60 Each 0    acetaminophen (TYLENOL) 325 mg tablet Take 500 mg by mouth every four (4) hours as needed for Pain.  predniSONE (DELTASONE) 10 mg tablet Prednisone 10mg tabs: p.o.  4 tabs daily for 2 days then drop to   3 tabs daily for 2 days then drop to   2 tabs daily for 2 days then drop to   1 tab daily for 2 days then stop. Dispense 20 tabs 20 Tab 0    OXYGEN-AIR DELIVERY SYSTEMS Take 2 L by inhalation continuous.  dextran 70/hypromellose (ARTIFICIAL TEARS, PF, OP) Apply 2 Drops to eye as needed for Other (both eyes for dryness).       diphenhydrAMINE (BENADRYL) 25 mg capsule Take 25 mg by mouth nightly as needed for Itching.  albuterol-ipratropium (DUO-NEB) 2.5 mg-0.5 mg/3 ml nebu 3 mL by Nebulization route every four (4) hours as needed. (Patient taking differently: 3 mL by Nebulization route every four (4) hours as needed for Other (Shortness of breath). ) 30 Nebule 0    albuterol (PROVENTIL HFA, VENTOLIN HFA, PROAIR HFA) 90 mcg/actuation inhaler Take 2 Puffs by inhalation every four (4) hours as needed for Wheezing or Shortness of Breath. 1 Inhaler 1    ipratropium (ATROVENT) 0.03 % nasal spray 2 Sprays by Both Nostrils route every twelve (12) hours as needed for Rhinitis.  tamsulosin (FLOMAX) 0.4 mg capsule Take 0.4 mg by mouth every evening. Past History     Past Medical History:  Past Medical History:   Diagnosis Date    Brain aneurysm     Cancer ([address]     prostate    COPD (chronic obstructive pulmonary disease) ([address]     Hypertension     Nocturia     Pneumonia     Radiation effect        Past Surgical History:  Past Surgical History:   Procedure Laterality Date    HX HERNIA REPAIR         Family History:  Family History   Problem Relation Age of Onset    Heart Disease Mother        Social History:  Social History     Tobacco Use    Smoking status: Former Smoker     Types: Cigarettes    Smokeless tobacco: Never Used   Substance Use Topics    Alcohol use: No    Drug use: Not on file       Allergies: Allergies   Allergen Reactions    Aspirin Other (comments)     \"messes my stomach up\"         Review of Systems   Review of Systems   Constitutional: Negative for chills and fever. Respiratory: Positive for chest tightness, shortness of breath and wheezing. Cardiovascular: Negative for chest pain. Gastrointestinal: Negative for abdominal pain, nausea and vomiting. All other systems reviewed and are negative.       Physical Exam     Vitals:    01/11/20 1500 01/11/20 1530 01/11/20 1540 01/11/20 1630   BP: 140/71 144/80  142/81   Pulse: 90 92  93   Resp: 20 23  18   Temp:       SpO2: 99% 96% 98% 94%   Weight:       Height:         Physical Exam  Vitals signs and nursing note reviewed. Vital signs and nursing notes reviewed  CONSTITUTIONAL: Alert, in apparent distress from dyspnea; well-developed; well-nourished. HEAD:  Normocephalic, atraumatic  EYES: PERRL; EOM's intact. ENTM: Nose: no rhinorrhea; Throat: no erythema or exudate, mucous membranes moist  Neck:  No JVD, supple without lymphadenopathy  RESP: Speaks in 2 word sentences, expiratory wheezing in all fields, poor air movement bilaterally  CV: S1 and S2 WNL; No murmurs, gallops or rubs. GI: Normal bowel sounds, abdomen soft and non-tender. No masses or organomegaly. : No costo-vertebral angle tenderness. BACK:  Non-tender  UPPER EXT:  Normal inspection  LOWER EXT: No edema, no calf tenderness. Distal pulses intact. NEURO: CN intact, reflexes 2/4 and sym, strength 5/5 and sym, sensation intact. SKIN: No rashes; Normal for age and stage. PSYCH:  Alert and oriented, normal affect. Diagnostic Study Results     Labs -     Recent Results (from the past 12 hour(s))   CBC WITH AUTOMATED DIFF    Collection Time: 01/11/20  1:50 PM   Result Value Ref Range    WBC 12.0 4.6 - 13.2 K/uL    RBC 3.45 (L) 4.70 - 5.50 M/uL    HGB 9.8 (L) 13.0 - 16.0 g/dL    HCT 29.5 (L) 36.0 - 48.0 %    MCV 85.5 74.0 - 97.0 FL    MCH 28.4 24.0 - 34.0 PG    MCHC 33.2 31.0 - 37.0 g/dL    RDW 13.0 11.6 - 14.5 %    PLATELET 610 449 - 064 K/uL    MPV 8.6 (L) 9.2 - 11.8 FL    NEUTROPHILS 71 40 - 73 %    LYMPHOCYTES 8 (L) 21 - 52 %    MONOCYTES 9 3 - 10 %    EOSINOPHILS 11 (H) 0 - 5 %    BASOPHILS 1 0 - 2 %    ABS. NEUTROPHILS 8.6 (H) 1.8 - 8.0 K/UL    ABS. LYMPHOCYTES 1.0 0.9 - 3.6 K/UL    ABS. MONOCYTES 1.1 0.05 - 1.2 K/UL    ABS. EOSINOPHILS 1.3 (H) 0.0 - 0.4 K/UL    ABS.  BASOPHILS 0.1 0.0 - 0.1 K/UL    DF AUTOMATED     METABOLIC PANEL, COMPREHENSIVE    Collection Time: 01/11/20  1:50 PM   Result Value Ref Range    Sodium 130 (L) 136 - 145 mmol/L    Potassium 4.0 3.5 - 5.5 mmol/L    Chloride 97 (L) 100 - 111 mmol/L    CO2 23 21 - 32 mmol/L    Anion gap 10 3.0 - 18 mmol/L    Glucose 108 (H) 74 - 99 mg/dL    BUN 17 7.0 - 18 MG/DL    Creatinine 1.08 0.6 - 1.3 MG/DL    BUN/Creatinine ratio 16 12 - 20      GFR est AA >60 >60 ml/min/1.73m2    GFR est non-AA >60 >60 ml/min/1.73m2    Calcium 8.5 8.5 - 10.1 MG/DL    Bilirubin, total 0.4 0.2 - 1.0 MG/DL    ALT (SGPT) 19 16 - 61 U/L    AST (SGOT) 17 10 - 38 U/L    Alk. phosphatase 152 (H) 45 - 117 U/L    Protein, total 6.3 (L) 6.4 - 8.2 g/dL    Albumin 2.7 (L) 3.4 - 5.0 g/dL    Globulin 3.6 2.0 - 4.0 g/dL    A-G Ratio 0.8 0.8 - 1.7     CARDIAC PANEL,(CK, CKMB & TROPONIN)    Collection Time: 01/11/20  1:50 PM   Result Value Ref Range    CK 96 39 - 308 U/L    CK - MB 3.3 <3.6 ng/ml    CK-MB Index 3.4 0.0 - 4.0 %    Troponin-I, QT <0.02 0.0 - 0.045 NG/ML       Radiologic Studies -   XR CHEST PORT   Final Result   IMPRESSION:      No definite acute abnormality. Foci of scarring, more pronounced on the right. CT Results  (Last 48 hours)    None        CXR Results  (Last 48 hours)               01/11/20 1439  XR CHEST PORT Final result    Impression:  IMPRESSION:       No definite acute abnormality. Foci of scarring, more pronounced on the right. Narrative:  EXAM: FRONTAL CHEST RADIOGRAPH       CLINICAL INDICATION/HISTORY: dyspnea   -Additional: None       COMPARISON: 10/30/2019       TECHNIQUE: Frontal view of the chest       _______________       FINDINGS:       HEART AND MEDIASTINUM: Normal cardiomediastinal silhouette. LUNGS AND PLEURAL SPACES: Right diaphragm elevation. There are some foci of   scarring and/or subsegmental atelectasis, more pronounced on the right. Curvilinear density on the right is most likely a pleural plaque, unchanged.        BONY THORAX AND SOFT TISSUES: Unremarkable.       _______________ Medications given in the ED-  Medications   albuterol-ipratropium (DUO-NEB) 2.5 MG-0.5 MG/3 ML (3 mL Nebulization Given 1/11/20 1359)   methylPREDNISolone (PF) (Solu-MEDROL) injection 125 mg (125 mg IntraVENous Given 1/11/20 1359)   albuterol-ipratropium (DUO-NEB) 2.5 MG-0.5 MG/3 ML (3 mL Nebulization Given 1/11/20 1540)   magnesium sulfate 2 g/50 ml IVPB (premix or compounded) (0 g IntraVENous IV Completed 1/11/20 1643)         Medical Decision Making   I am the first provider for this patient. I reviewed the vital signs, available nursing notes, past medical history, past surgical history, family history and social history. Vital Signs-Reviewed the patient's vital signs. Pulse Oximetry Analysis - 99% on room air    Cardiac Monitor:  Rate: 98 bpm  Rhythm: NSR    EKG interpretation: (Preliminary)  Rhythm: Sinus rhythm with PVCs. Rate: 97 bpm; STEMI  EKG read by Glenna Stout MD at 1:46 PM    Records Reviewed: Nursing Notes    Provider Notes (Medical Decision Making):   DDX: COPD exacerbation, URI, pneumonia, ACS    Procedures:  Procedures    ED Course:   1:44 PM Initial assessment performed. The patients presenting problems have been discussed, and they are in agreement with the care plan formulated and outlined with them. I have encouraged them to ask questions as they arise throughout their visit. 3:29 PM Reevaluated patient, improved air movement, diffuse expiratory wheezing. 4:57 PM wheezing improved, good air movement bilaterally, patient sitting up in bed with normal oxygen saturations. Diagnosis and Disposition     Discussion:  66 y.o. male with COPD exacerbation. Patient is on oxygen at home order patient satting normally but has diffuse white wheezes bilaterally when he arrived poor air movement. Patient was given magnesium as well as 2 nebulizers in the ED. Patient lungs cleared well. Patient is stable for discharge home.   Patient understands agrees with plan, he will follow-up with his primary care doctor next week. DISCHARGE NOTE:  4:59 PM   [place]  results have been reviewed with him. He has been counseled regarding his diagnosis, treatment, and plan. He verbally conveys understanding and agreement of the signs, symptoms, diagnosis, treatment and prognosis and additionally agrees to follow up as discussed. He also agrees with the care-plan and conveys that all of his questions have been answered. I have also provided discharge instructions for him that include: educational information regarding their diagnosis and treatment, and list of reasons why they would want to return to the ED prior to their follow-up appointment, should his condition change. He has been provided with education for proper emergency department utilization. CLINICAL IMPRESSION:    1. Chronic obstructive pulmonary disease with acute exacerbation (HCC)        PLAN:  1. D/C Home  2. Current Discharge Medication List      START taking these medications    Details   ! ! predniSONE (DELTASONE) 50 mg tablet Take 1 Tab by mouth daily for 4 days. First dose 12 Jan 2020  Qty: 4 Tab, Refills: 0      azithromycin (ZITHROMAX Z-CARLOS ENRIQUE) 250 mg tablet Take 2 tabs po on day 1, then 1 tab po days 2-5  Qty: 6 Tab, Refills: 0       !! - Potential duplicate medications found. Please discuss with provider. CONTINUE these medications which have NOT CHANGED    Details   dilTIAZem (CARDIZEM) 30 mg tablet Take 1 Tab by mouth Before breakfast, lunch, and dinner. Qty: 90 Tab, Refills: 0      apixaban (ELIQUIS) 5 mg tablet Take 1 Tab by mouth two (2) times a day. Qty: 60 Tab, Refills: 0      budesonide (PULMICORT) 0.5 mg/2 mL nbsp 2 mL by Nebulization route two (2) times a day. Qty: 60 Each, Refills: 0      acetaminophen (TYLENOL) 325 mg tablet Take 500 mg by mouth every four (4) hours as needed for Pain.       !! predniSONE (DELTASONE) 10 mg tablet Prednisone 10mg tabs: p.o.  4 tabs daily for 2 days then drop to   3 tabs daily for 2 days then drop to   2 tabs daily for 2 days then drop to   1 tab daily for 2 days then stop. Dispense 20 tabs  Qty: 20 Tab, Refills: 0      OXYGEN-AIR DELIVERY SYSTEMS Take 2 L by inhalation continuous. dextran 70/hypromellose (ARTIFICIAL TEARS, PF, OP) Apply 2 Drops to eye as needed for Other (both eyes for dryness). diphenhydrAMINE (BENADRYL) 25 mg capsule Take 25 mg by mouth nightly as needed for Itching. albuterol-ipratropium (DUO-NEB) 2.5 mg-0.5 mg/3 ml nebu 3 mL by Nebulization route every four (4) hours as needed. Qty: 30 Nebule, Refills: 0      albuterol (PROVENTIL HFA, VENTOLIN HFA, PROAIR HFA) 90 mcg/actuation inhaler Take 2 Puffs by inhalation every four (4) hours as needed for Wheezing or Shortness of Breath. Qty: 1 Inhaler, Refills: 1      ipratropium (ATROVENT) 0.03 % nasal spray 2 Sprays by Both Nostrils route every twelve (12) hours as needed for Rhinitis. tamsulosin (FLOMAX) 0.4 mg capsule Take 0.4 mg by mouth every evening. !! - Potential duplicate medications found. Please discuss with provider. 3.   Follow-up Information     Follow up With Specialties Details Why Contact Info    Fabby Downey MD Family Practice Schedule an appointment as soon as possible for a visit in 3 days For follow up from Emergency Department visit. [facility] [phone] [facility]      [facility] EMERGENCY DEPT Emergency Medicine  As needed; If symptoms worsen [address] [id number]  [address]     Please note that this dictation was completed with [employer], the [employer] voice recognition software. Quite often unanticipated grammatical, syntax, homophones, and other interpretive errors are inadvertently transcribed by the computer software. Please disregard these errors. Please excuse any errors that have escaped final proofreading.     Leonardo Light MD

## 2020-01-11 NOTE — DISCHARGE INSTRUCTIONS

## 2020-01-12 LAB
ATRIAL RATE: 97 BPM
CALCULATED P AXIS, ECG09: 60 DEGREES
CALCULATED R AXIS, ECG10: 38 DEGREES
CALCULATED T AXIS, ECG11: 61 DEGREES
DIAGNOSIS, 93000: NORMAL
P-R INTERVAL, ECG05: 166 MS
Q-T INTERVAL, ECG07: 352 MS
QRS DURATION, ECG06: 82 MS
QTC CALCULATION (BEZET), ECG08: 447 MS
VENTRICULAR RATE, ECG03: 97 BPM

## 2020-01-18 ENCOUNTER — HOSPITAL ENCOUNTER (EMERGENCY)
Age: 75
Discharge: HOME | End: 2020-01-18
Attending: EMERGENCY MEDICINE
Payer: MEDICARE

## 2020-01-18 ENCOUNTER — APPOINTMENT (OUTPATIENT)
Dept: GENERAL RADIOLOGY | Age: 75
End: 2020-01-18
Attending: EMERGENCY MEDICINE
Payer: MEDICARE

## 2020-01-18 VITALS
BODY MASS INDEX: 30.31 KG/M2 | HEART RATE: 87 BPM | SYSTOLIC BLOOD PRESSURE: 131 MMHG | HEIGHT: 68 IN | WEIGHT: 200 LBS | TEMPERATURE: 98.6 F | RESPIRATION RATE: 19 BRPM | OXYGEN SATURATION: 100 % | DIASTOLIC BLOOD PRESSURE: 56 MMHG

## 2020-01-18 DIAGNOSIS — J42 CHRONIC BRONCHITIS, UNSPECIFIED CHRONIC BRONCHITIS TYPE (HCC): ICD-10-CM

## 2020-01-18 DIAGNOSIS — R05.9 COUGH: Primary | ICD-10-CM

## 2020-01-18 LAB
ANION GAP SERPL CALC-SCNC: 4 MMOL/L (ref 3–18)
ATRIAL RATE: 95 BPM
BASOPHILS # BLD: 0 K/UL (ref 0–0.1)
BASOPHILS NFR BLD: 0 % (ref 0–2)
BUN SERPL-MCNC: 21 MG/DL (ref 7–18)
BUN/CREAT SERPL: 21 (ref 12–20)
CALCIUM SERPL-MCNC: 7.9 MG/DL (ref 8.5–10.1)
CALCULATED P AXIS, ECG09: 63 DEGREES
CALCULATED R AXIS, ECG10: 36 DEGREES
CALCULATED T AXIS, ECG11: 63 DEGREES
CHLORIDE SERPL-SCNC: 104 MMOL/L (ref 100–111)
CO2 SERPL-SCNC: 30 MMOL/L (ref 21–32)
CREAT SERPL-MCNC: 1.02 MG/DL (ref 0.6–1.3)
DIAGNOSIS, 93000: NORMAL
DIFFERENTIAL METHOD BLD: ABNORMAL
EOSINOPHIL # BLD: 1.6 K/UL (ref 0–0.4)
EOSINOPHIL NFR BLD: 13 % (ref 0–5)
ERYTHROCYTE [DISTWIDTH] IN BLOOD BY AUTOMATED COUNT: 13.5 % (ref 11.6–14.5)
GLUCOSE SERPL-MCNC: 98 MG/DL (ref 74–99)
HCT VFR BLD AUTO: 30.2 % (ref 36–48)
HGB BLD-MCNC: 9.6 G/DL (ref 13–16)
LYMPHOCYTES # BLD: 1.4 K/UL (ref 0.9–3.6)
LYMPHOCYTES NFR BLD: 11 % (ref 21–52)
MCH RBC QN AUTO: 28.2 PG (ref 24–34)
MCHC RBC AUTO-ENTMCNC: 31.8 G/DL (ref 31–37)
MCV RBC AUTO: 88.8 FL (ref 74–97)
MONOCYTES # BLD: 0.9 K/UL (ref 0.05–1.2)
MONOCYTES NFR BLD: 7 % (ref 3–10)
NEUTS SEG # BLD: 8.6 K/UL (ref 1.8–8)
NEUTS SEG NFR BLD: 69 % (ref 40–73)
P-R INTERVAL, ECG05: 152 MS
PLATELET # BLD AUTO: 296 K/UL (ref 135–420)
PMV BLD AUTO: 8.3 FL (ref 9.2–11.8)
POTASSIUM SERPL-SCNC: 3.9 MMOL/L (ref 3.5–5.5)
Q-T INTERVAL, ECG07: 346 MS
QRS DURATION, ECG06: 80 MS
QTC CALCULATION (BEZET), ECG08: 434 MS
RBC # BLD AUTO: 3.4 M/UL (ref 4.7–5.5)
SODIUM SERPL-SCNC: 138 MMOL/L (ref 136–145)
VENTRICULAR RATE, ECG03: 95 BPM
WBC # BLD AUTO: 12.5 K/UL (ref 4.6–13.2)

## 2020-01-18 PROCEDURE — 94640 AIRWAY INHALATION TREATMENT: CPT

## 2020-01-18 PROCEDURE — 80048 BASIC METABOLIC PNL TOTAL CA: CPT

## 2020-01-18 PROCEDURE — 74011636637 HC RX REV CODE- 636/637: Performed by: EMERGENCY MEDICINE

## 2020-01-18 PROCEDURE — 85025 COMPLETE CBC W/AUTO DIFF WBC: CPT

## 2020-01-18 PROCEDURE — 99285 EMERGENCY DEPT VISIT HI MDM: CPT

## 2020-01-18 PROCEDURE — 71045 X-RAY EXAM CHEST 1 VIEW: CPT

## 2020-01-18 PROCEDURE — 93005 ELECTROCARDIOGRAM TRACING: CPT

## 2020-01-18 PROCEDURE — 74011000250 HC RX REV CODE- 250: Performed by: EMERGENCY MEDICINE

## 2020-01-18 PROCEDURE — A9270 NON-COVERED ITEM OR SERVICE: HCPCS | Performed by: EMERGENCY MEDICINE

## 2020-01-18 PROCEDURE — 77030013140 HC MSK NEB VYRM -A

## 2020-01-18 RX ORDER — IPRATROPIUM BROMIDE AND ALBUTEROL SULFATE 2.5; .5 MG/3ML; MG/3ML
3 SOLUTION RESPIRATORY (INHALATION)
Status: COMPLETED | OUTPATIENT
Start: 2020-01-18 | End: 2020-01-18

## 2020-01-18 RX ORDER — PREDNISONE 50 MG/1
50 TABLET ORAL DAILY
Qty: 4 TAB | Refills: 0 | Status: SHIPPED | OUTPATIENT
Start: 2020-01-18 | End: 2020-01-22

## 2020-01-18 RX ORDER — PREDNISONE 20 MG/1
60 TABLET ORAL ONCE
Status: COMPLETED | OUTPATIENT
Start: 2020-01-18 | End: 2020-01-18

## 2020-01-18 RX ADMIN — IPRATROPIUM BROMIDE AND ALBUTEROL SULFATE 3 ML: .5; 3 SOLUTION RESPIRATORY (INHALATION) at 15:37

## 2020-01-18 RX ADMIN — IPRATROPIUM BROMIDE AND ALBUTEROL SULFATE 3 ML: .5; 3 SOLUTION RESPIRATORY (INHALATION) at 15:42

## 2020-01-18 RX ADMIN — PREDNISONE 60 MG: 20 TABLET ORAL at 16:24

## 2020-01-18 RX ADMIN — IPRATROPIUM BROMIDE AND ALBUTEROL SULFATE 3 ML: .5; 3 SOLUTION RESPIRATORY (INHALATION) at 15:31

## 2020-01-18 NOTE — ED NOTES
Assumed care for discharge only, no acute distress on discharge, written inst given to pt and spouse, with rx x 1 e faxed to pharmacy, verbalizes understanding  Patient armband removed and shredded

## 2020-01-18 NOTE — ED TRIAGE NOTES
Patient was d/c over a week ago and now he cannot even lay on the bed with out shortness of breath. Patient wears home oxygen but did not bring it with him.  Patient sats 91% placed on 2 LPM via NC and sats up to 96 %

## 2020-01-18 NOTE — ED PROVIDER NOTES
EMERGENCY DEPARTMENT HISTORY AND PHYSICAL EXAM    Date: 1/18/2020  Patient Name: eMg Lima    History of Presenting Illness     Chief Complaint   Patient presents with    Shortness of Breath         History Provided By: Patient and Patient's Wife    Salomon Silva is a 66 y.o. male with PMHX of COPD on 2 L nasal cannula, hearing loss who presents to the emergency department C/O sputum and increased phlegm. Patient reports that over the past 2 weeks he has had a lot of phlegm and feels like he cannot get the phlegm out. States this is typical color dark yellow-green. No fevers. Breathing otherwise at baseline. Patient is on 2 L all the time compliant of medications at home. No longer smoking. Followed by Dr. Marcin Torres    PCP: Maru Fajardo MD    Current Outpatient Medications   Medication Sig Dispense Refill    predniSONE (DELTASONE) 50 mg tablet Take 1 Tab by mouth daily for 4 days. 4 Tab 0    dilTIAZem (CARDIZEM) 30 mg tablet Take 1 Tab by mouth Before breakfast, lunch, and dinner. 90 Tab 0    apixaban (ELIQUIS) 5 mg tablet Take 1 Tab by mouth two (2) times a day. 60 Tab 0    budesonide (PULMICORT) 0.5 mg/2 mL nbsp 2 mL by Nebulization route two (2) times a day. 60 Each 0    acetaminophen (TYLENOL) 325 mg tablet Take 500 mg by mouth every four (4) hours as needed for Pain.  OXYGEN-AIR DELIVERY SYSTEMS Take 2 L by inhalation continuous.  dextran 70/hypromellose (ARTIFICIAL TEARS, PF, OP) Apply 2 Drops to eye as needed for Other (both eyes for dryness).  diphenhydrAMINE (BENADRYL) 25 mg capsule Take 25 mg by mouth nightly as needed for Itching.  albuterol-ipratropium (DUO-NEB) 2.5 mg-0.5 mg/3 ml nebu 3 mL by Nebulization route every four (4) hours as needed. (Patient taking differently: 3 mL by Nebulization route every four (4) hours as needed for Other (Shortness of breath). ) 30 Nebule 0    albuterol (PROVENTIL HFA, VENTOLIN HFA, PROAIR HFA) 90 mcg/actuation inhaler Take 2 Puffs by inhalation every four (4) hours as needed for Wheezing or Shortness of Breath. 1 Inhaler 1    ipratropium (ATROVENT) 0.03 % nasal spray 2 Sprays by Both Nostrils route every twelve (12) hours as needed for Rhinitis.  tamsulosin (FLOMAX) 0.4 mg capsule Take 0.4 mg by mouth every evening. Past History     Past Medical History:  Past Medical History:   Diagnosis Date    Brain aneurysm     Cancer ([facility] [address]     prostate    [facility]     Hypertension     Nocturia     Pneumonia     Radiation effect        Past Surgical History:  Past Surgical History:   Procedure Laterality Date    HX HERNIA REPAIR         Family History:  Family History   Problem Relation Age of Onset    Heart Disease Mother        Social History:  Social History     Tobacco Use    Smoking status: Former Smoker     Types: Cigarettes    Smokeless tobacco: Never Used   Substance Use Topics    Alcohol use: No    Drug use: Not on file       Allergies: Allergies   Allergen Reactions    Aspirin Other (comments)     \"messes my stomach up\"         Review of Systems   Review of Systems   Constitutional: Negative for chills and fever. HENT: Positive for hearing loss (chronic). Respiratory: Positive for cough and wheezing. Negative for chest tightness and shortness of breath. Cardiovascular: Negative for chest pain, palpitations and leg swelling. Gastrointestinal: Negative for abdominal pain, blood in stool, diarrhea, nausea and vomiting. Genitourinary: Negative for difficulty urinating and dysuria. Musculoskeletal: Negative for arthralgias and back pain. Neurological: Negative for weakness, light-headedness and headaches. Hematological: Negative for adenopathy. Does not bruise/bleed easily. All other systems reviewed and are negative.         Physical Exam     Vitals:    01/18/20 1533 01/18/20 1537 01/18/20 1542 01/18/20 1545   BP:    143/62   Pulse:    87   Resp:    16 Temp:       SpO2: 100% 100% 100% 100%   Weight:       Height:         Physical Exam  Vitals signs and nursing note reviewed. Constitutional:       General: He is not in acute distress. Appearance: He is well-developed. HENT:      Head: Normocephalic and atraumatic. Eyes:      Conjunctiva/sclera: Conjunctivae normal.      Pupils: Pupils are equal, round, and reactive to light. Neck:      Musculoskeletal: Normal range of motion and neck supple. Cardiovascular:      Rate and Rhythm: Normal rate and regular rhythm. Heart sounds: Normal heart sounds. Pulmonary:      Effort: Pulmonary effort is normal. No tachypnea, accessory muscle usage or respiratory distress. Breath sounds: Wheezing present. No rales. Chest:      Chest wall: No tenderness. Abdominal:      General: There is no distension. Palpations: Abdomen is soft. Tenderness: There is no tenderness. There is no guarding or rebound. Musculoskeletal: Normal range of motion. General: No tenderness. Lymphadenopathy:      Cervical: No cervical adenopathy. Skin:     General: Skin is warm and dry. Neurological:      Mental Status: He is alert and oriented to person, place, and time. Cranial Nerves: No cranial nerve deficit. Motor: No abnormal muscle tone.       Coordination: Coordination normal.   Psychiatric:         Behavior: Behavior normal.         Diagnostic Study Results     Labs -     Recent Results (from the past 12 hour(s))   CBC WITH AUTOMATED DIFF    Collection Time: 01/18/20  3:34 PM   Result Value Ref Range    WBC 12.5 4.6 - 13.2 K/uL    RBC 3.40 (L) 4.70 - 5.50 M/uL    HGB 9.6 (L) 13.0 - 16.0 g/dL    HCT 30.2 (L) 36.0 - 48.0 %    MCV 88.8 74.0 - 97.0 FL    MCH 28.2 24.0 - 34.0 PG    MCHC 31.8 31.0 - 37.0 g/dL    RDW 13.5 11.6 - 14.5 %    PLATELET 425 131 - 870 K/uL    MPV 8.3 (L) 9.2 - 11.8 FL    NEUTROPHILS 69 40 - 73 %    LYMPHOCYTES 11 (L) 21 - 52 %    MONOCYTES 7 3 - 10 %    EOSINOPHILS 13 (H) 0 - 5 %    BASOPHILS 0 0 - 2 %    ABS. NEUTROPHILS 8.6 (H) 1.8 - 8.0 K/UL    ABS. LYMPHOCYTES 1.4 0.9 - 3.6 K/UL    ABS. MONOCYTES 0.9 0.05 - 1.2 K/UL    ABS. EOSINOPHILS 1.6 (H) 0.0 - 0.4 K/UL    ABS. BASOPHILS 0.0 0.0 - 0.1 K/UL    DF AUTOMATED     METABOLIC PANEL, BASIC    Collection Time: 01/18/20  3:34 PM   Result Value Ref Range    Sodium 138 136 - 145 mmol/L    Potassium 3.9 3.5 - 5.5 mmol/L    Chloride 104 100 - 111 mmol/L    CO2 30 21 - 32 mmol/L    Anion gap 4 3.0 - 18 mmol/L    Glucose 98 74 - 99 mg/dL    BUN 21 (H) 7.0 - 18 MG/DL    Creatinine 1.02 0.6 - 1.3 MG/DL    BUN/Creatinine ratio 21 (H) 12 - 20      GFR est AA >60 >60 ml/min/1.73m2    GFR est non-AA >60 >60 ml/min/1.73m2    Calcium 7.9 (L) 8.5 - 10.1 MG/DL       Radiologic Studies -   XR CHEST PORT   Final Result   IMPRESSION:      No acute pulmonary findings        CT Results  (Last 48 hours)    None        CXR Results  (Last 48 hours)               01/18/20 1556  XR CHEST PORT Final result    Impression:  IMPRESSION:       No acute pulmonary findings       Narrative:  EXAM: CHEST RADIOGRAPH       CLINICAL INDICATION/HISTORY: COPD   -Additional: None       COMPARISON: January 11, 2020       TECHNIQUE: Portable frontal view of the chest       _______________       FINDINGS:       SUPPORT DEVICES: None. HEART AND MEDIASTINUM: No appreciable cardiomegaly. Remaining mediastinal   contours within normal limits. LUNGS AND PLEURAL SPACES: Pleural calcification is unchanged in the left midlung   zone. Lung volumes are hypoinflated. There is right basilar atelectasis. No   consolidation, pleural effusion, or pneumothorax.        BONY THORAX AND SOFT TISSUES: Unremarkable.       _______________                 Medications given in the ED-  Medications   albuterol-ipratropium (DUO-NEB) 2.5 MG-0.5 MG/3 ML (3 mL Nebulization Given 1/18/20 1542)   albuterol-ipratropium (DUO-NEB) 2.5 MG-0.5 MG/3 ML (3 mL Nebulization Given 1/18/20 8379) albuterol-ipratropium (DUO-NEB) 2.5 MG-0.5 MG/3 ML (3 mL Nebulization Given 1/18/20 1531)   predniSONE (DELTASONE) tablet 60 mg (60 mg Oral Given 1/18/20 1624)         Medical Decision Making   I am the first provider for this patient. I reviewed the vital signs, available nursing notes, past medical history, past surgical history, family history and social history. Vital Signs-Reviewed the patient's vital signs. Pulse Oximetry Analysis - 96% on 2L     Cardiac Monitor:  Rate: 87 bpm  Rhythm: NSR    EKG interpretation: (Preliminary)  EKG read by Dr. Venancio Jeans at 1505   Normal sinus rhythm rate of 95, PVC, no ST elevation or depression, otherwise unremarkable EKG    Records Reviewed: Nursing Notes and Old Medical Records    Provider Notes (Medical Decision Making): Shawn Perez is a 66 y.o. male history of COPD presents with chronic sputum and difficulty with his sputum expectorant    Procedures:  Procedures    ED Course:   5:09 PM  Patient feels better after DuoNeb treatment and steroids. Lungs have opened up and patient is clear. He is satting at his baseline on nasal cannula. He is not having significant coughing in emergency department. Discussed that this is the nature of his chronic disease and that he will have issues with getting sputum out and congestion. No indications of any infection or significant change from his general baseline. Strongly encourage patient to follow-up with Dr. Viola Zaldivar, his pulmonologist, this week for evaluation. Will continue on prednisone burst.     Diagnosis and Disposition     Critical Care:    DISCHARGE NOTE:    Umesh Cassidy  results have been reviewed with him. He has been counseled regarding his diagnosis, treatment, and plan. He verbally conveys understanding and agreement of the signs, symptoms, diagnosis, treatment and prognosis and additionally agrees to follow up as discussed.   He also agrees with the care-plan and conveys that all of his questions have been answered. I have also provided discharge instructions for him that include: educational information regarding their diagnosis and treatment, and list of reasons why they would want to return to the ED prior to their follow-up appointment, should his condition change. He has been provided with education for proper emergency department utilization. CLINICAL IMPRESSION:    1. Cough    2. Chronic bronchitis, unspecified chronic bronchitis type ([address]        PLAN:  1. D/C Home  2. Current Discharge Medication List      CONTINUE these medications which have CHANGED    Details   predniSONE (DELTASONE) 50 mg tablet Take 1 Tab by mouth daily for 4 days. Qty: 4 Tab, Refills: 0           3. Follow-up Information     Follow up With Specialties Details Why Contact Info    Callie Remy MD [facility]   [id number] [address] [id number] [phone]      [facility] EMERGENCY DEPT Emergency Medicine  If symptoms worsen [address]  [phone]    Lennie Dove MD Pulmonary Disease Schedule an appointment as soon as possible for a visit in 2 days  [place]  [phone]          _______________________________      Please note that this dictation was completed with [email or web address], the computer voice recognition software. Quite often unanticipated grammatical, syntax, homophones, and other interpretive errors are inadvertently transcribed by the computer software. Please disregard these errors. Please excuse any errors that have escaped final proofreading.

## 2020-02-20 ENCOUNTER — HOSPITAL ENCOUNTER (EMERGENCY)
Age: 75
Discharge: HOME | DRG: 189 | End: 2020-02-20
Attending: EMERGENCY MEDICINE
Payer: MEDICARE

## 2020-02-20 ENCOUNTER — APPOINTMENT (OUTPATIENT)
Dept: GENERAL RADIOLOGY | Age: 75
DRG: 189 | End: 2020-02-20
Attending: EMERGENCY MEDICINE
Payer: MEDICARE

## 2020-02-20 VITALS
TEMPERATURE: 98.1 F | DIASTOLIC BLOOD PRESSURE: 67 MMHG | WEIGHT: 200 LBS | RESPIRATION RATE: 24 BRPM | BODY MASS INDEX: 30.31 KG/M2 | SYSTOLIC BLOOD PRESSURE: 149 MMHG | OXYGEN SATURATION: 100 % | HEART RATE: 98 BPM | HEIGHT: 68 IN

## 2020-02-20 DIAGNOSIS — J44.1 COPD EXACERBATION (HCC): Primary | ICD-10-CM

## 2020-02-20 LAB
ALBUMIN SERPL-MCNC: 3.3 G/DL (ref 3.4–5)
ALBUMIN/GLOB SERPL: 1 {RATIO} (ref 0.8–1.7)
ALP SERPL-CCNC: 104 U/L (ref 45–117)
ALT SERPL-CCNC: 14 U/L (ref 16–61)
ANION GAP SERPL CALC-SCNC: 7 MMOL/L (ref 3–18)
AST SERPL-CCNC: 10 U/L (ref 10–38)
BASOPHILS # BLD: 0.1 K/UL (ref 0–0.1)
BASOPHILS NFR BLD: 1 % (ref 0–2)
BILIRUB SERPL-MCNC: 0.4 MG/DL (ref 0.2–1)
BUN SERPL-MCNC: 16 MG/DL (ref 7–18)
BUN/CREAT SERPL: 15 (ref 12–20)
CALCIUM SERPL-MCNC: 8.9 MG/DL (ref 8.5–10.1)
CHLORIDE SERPL-SCNC: 99 MMOL/L (ref 100–111)
CK MB CFR SERPL CALC: 4.5 % (ref 0–4)
CK MB SERPL-MCNC: 4.5 NG/ML (ref 5–25)
CK SERPL-CCNC: 101 U/L (ref 39–308)
CO2 SERPL-SCNC: 28 MMOL/L (ref 21–32)
CREAT SERPL-MCNC: 1.09 MG/DL (ref 0.6–1.3)
DIFFERENTIAL METHOD BLD: ABNORMAL
EOSINOPHIL # BLD: 1.6 K/UL (ref 0–0.4)
EOSINOPHIL NFR BLD: 13 % (ref 0–5)
ERYTHROCYTE [DISTWIDTH] IN BLOOD BY AUTOMATED COUNT: 13.8 % (ref 11.6–14.5)
GLOBULIN SER CALC-MCNC: 3.2 G/DL (ref 2–4)
GLUCOSE SERPL-MCNC: 85 MG/DL (ref 74–99)
HCT VFR BLD AUTO: 33.5 % (ref 36–48)
HGB BLD-MCNC: 10.9 G/DL (ref 13–16)
LYMPHOCYTES # BLD: 1 K/UL (ref 0.9–3.6)
LYMPHOCYTES NFR BLD: 8 % (ref 21–52)
MCH RBC QN AUTO: 28.2 PG (ref 24–34)
MCHC RBC AUTO-ENTMCNC: 32.5 G/DL (ref 31–37)
MCV RBC AUTO: 86.6 FL (ref 74–97)
MONOCYTES # BLD: 1.3 K/UL (ref 0.05–1.2)
MONOCYTES NFR BLD: 11 % (ref 3–10)
NEUTS SEG # BLD: 8.4 K/UL (ref 1.8–8)
NEUTS SEG NFR BLD: 67 % (ref 40–73)
PLATELET # BLD AUTO: 320 K/UL (ref 135–420)
PMV BLD AUTO: 9 FL (ref 9.2–11.8)
POTASSIUM SERPL-SCNC: 4.2 MMOL/L (ref 3.5–5.5)
PROT SERPL-MCNC: 6.5 G/DL (ref 6.4–8.2)
RBC # BLD AUTO: 3.87 M/UL (ref 4.7–5.5)
SODIUM SERPL-SCNC: 134 MMOL/L (ref 136–145)
TROPONIN I SERPL-MCNC: <0.02 NG/ML (ref 0–0.04)
WBC # BLD AUTO: 12.3 K/UL (ref 4.6–13.2)

## 2020-02-20 PROCEDURE — 77030013140 HC MSK NEB VYRM -A

## 2020-02-20 PROCEDURE — 74011250636 HC RX REV CODE- 250/636: Performed by: EMERGENCY MEDICINE

## 2020-02-20 PROCEDURE — 94640 AIRWAY INHALATION TREATMENT: CPT

## 2020-02-20 PROCEDURE — 80053 COMPREHEN METABOLIC PANEL: CPT

## 2020-02-20 PROCEDURE — 82550 ASSAY OF CK (CPK): CPT

## 2020-02-20 PROCEDURE — 85025 COMPLETE CBC W/AUTO DIFF WBC: CPT

## 2020-02-20 PROCEDURE — 74011000250 HC RX REV CODE- 250: Performed by: EMERGENCY MEDICINE

## 2020-02-20 PROCEDURE — 93005 ELECTROCARDIOGRAM TRACING: CPT

## 2020-02-20 PROCEDURE — 71045 X-RAY EXAM CHEST 1 VIEW: CPT

## 2020-02-20 PROCEDURE — 99285 EMERGENCY DEPT VISIT HI MDM: CPT

## 2020-02-20 PROCEDURE — 96374 THER/PROPH/DIAG INJ IV PUSH: CPT

## 2020-02-20 RX ORDER — SODIUM CHLORIDE 0.9 % (FLUSH) 0.9 %
5-40 SYRINGE (ML) INJECTION EVERY 8 HOURS
Status: DISCONTINUED | OUTPATIENT
Start: 2020-02-20 | End: 2020-02-20 | Stop reason: HOSPADM

## 2020-02-20 RX ORDER — IPRATROPIUM BROMIDE AND ALBUTEROL SULFATE 2.5; .5 MG/3ML; MG/3ML
3 SOLUTION RESPIRATORY (INHALATION)
Status: COMPLETED | OUTPATIENT
Start: 2020-02-20 | End: 2020-02-20

## 2020-02-20 RX ORDER — PREDNISONE 50 MG/1
50 TABLET ORAL DAILY
Qty: 4 TAB | Refills: 0 | Status: SHIPPED | OUTPATIENT
Start: 2020-02-21 | End: 2020-02-25

## 2020-02-20 RX ORDER — SODIUM CHLORIDE 0.9 % (FLUSH) 0.9 %
5-40 SYRINGE (ML) INJECTION AS NEEDED
Status: DISCONTINUED | OUTPATIENT
Start: 2020-02-20 | End: 2020-02-20 | Stop reason: HOSPADM

## 2020-02-20 RX ORDER — IPRATROPIUM BROMIDE AND ALBUTEROL SULFATE 2.5; .5 MG/3ML; MG/3ML
3 SOLUTION RESPIRATORY (INHALATION) ONCE
Status: COMPLETED | OUTPATIENT
Start: 2020-02-20 | End: 2020-02-20

## 2020-02-20 RX ORDER — CHLORTHALIDONE 25 MG/1
25 TABLET ORAL DAILY
COMMUNITY
End: 2020-08-10

## 2020-02-20 RX ADMIN — IPRATROPIUM BROMIDE AND ALBUTEROL SULFATE 3 ML: .5; 3 SOLUTION RESPIRATORY (INHALATION) at 13:20

## 2020-02-20 RX ADMIN — METHYLPREDNISOLONE SODIUM SUCCINATE 125 MG: 125 INJECTION, POWDER, FOR SOLUTION INTRAMUSCULAR; INTRAVENOUS at 13:20

## 2020-02-20 RX ADMIN — IPRATROPIUM BROMIDE AND ALBUTEROL SULFATE 3 ML: .5; 3 SOLUTION RESPIRATORY (INHALATION) at 13:21

## 2020-02-20 RX ADMIN — IPRATROPIUM BROMIDE AND ALBUTEROL SULFATE 3 ML: .5; 3 SOLUTION RESPIRATORY (INHALATION) at 12:38

## 2020-02-20 NOTE — ED PROVIDER NOTES
EMERGENCY DEPARTMENT HISTORY AND PHYSICAL EXAM    Date: 2/20/2020  Patient Name: Naomi Zaomra    History of Presenting Illness     Chief Complaint   Patient presents with    Shortness of Breath         History Provided By: Patient    Pete Mcgrath is a 66 y.o. male with PMHX of COPD on 2 L nasal cannula at baseline who presents to the emergency department C/O worsening COPD and sputum. Patient reports his been in the state of his normal health however since last night has felt more congested and has difficulty exploiting sputum. States last night was very rough and he felt like he could not get sputum out and he had severe shortness of breath. He feels better this afternoon evaluation after receiving DuoNeb treatment prior to arrival.  States this feels like his typical COPD. He has no chest pain or fever. States some facial congestion but no pain. Compliant with DuoNeb therapy at home. Patient lives with wife    PCP: Carmen Buchanan MD    Current Facility-Administered Medications   Medication Dose Route Frequency Provider Last Rate Last Dose    sodium chloride (NS) flush 5-40 mL  5-40 mL IntraVENous Q8H Carly Hobbs MD        sodium chloride (NS) flush 5-40 mL  5-40 mL IntraVENous PRN Nam Torres MD         Current Outpatient Medications   Medication Sig Dispense Refill    chlorthalidone (HYGROTEN) 25 mg tablet Take 25 mg by mouth daily.  [START ON 2/21/2020] predniSONE (DELTASONE) 50 mg tablet Take 1 Tab by mouth daily for 4 days. 4 Tab 0    OXYGEN-AIR DELIVERY SYSTEMS Take 2 L by inhalation continuous.  albuterol-ipratropium (DUO-NEB) 2.5 mg-0.5 mg/3 ml nebu 3 mL by Nebulization route every four (4) hours as needed. (Patient taking differently: 3 mL by Nebulization route every four (4) hours as needed for Other (Shortness of breath). ) 30 Nebule 0    albuterol (PROVENTIL HFA, VENTOLIN HFA, PROAIR HFA) 90 mcg/actuation inhaler Take 2 Puffs by inhalation every four (4) hours as needed for Wheezing or Shortness of Breath. 1 Inhaler 1    ipratropium (ATROVENT) 0.03 % nasal spray 2 Sprays by Both Nostrils route every twelve (12) hours as needed for Rhinitis.  tamsulosin (FLOMAX) 0.4 mg capsule Take 0.4 mg by mouth every evening.  acetaminophen (TYLENOL) 325 mg tablet Take 500 mg by mouth every four (4) hours as needed for Pain.  dextran 70/hypromellose (ARTIFICIAL TEARS, PF, OP) Apply 2 Drops to eye as needed for Other (both eyes for dryness).  diphenhydrAMINE (BENADRYL) 25 mg capsule Take 25 mg by mouth nightly as needed for Itching. Past History     Past Medical History:  Past Medical History:   Diagnosis Date    Brain aneurysm     Cancer ([address]     prostate    [facility]     Hypertension     Nocturia     Pneumonia     Radiation effect        Past Surgical History:  Past Surgical History:   Procedure Laterality Date    HX HERNIA REPAIR         Family History:  Family History   Problem Relation Age of Onset    Heart Disease Mother        Social History:  Social History     Tobacco Use    Smoking status: Former Smoker     Types: Cigarettes    Smokeless tobacco: Never Used   Substance Use Topics    Alcohol use: No    Drug use: Never       Allergies: Allergies   Allergen Reactions    Aspirin Other (comments)     \"messes my stomach up\"         Review of Systems   Review of Systems   Constitutional: Negative for chills and fever. HENT: Positive for hearing loss (chronic). Respiratory: Positive for cough, shortness of breath and wheezing. Cardiovascular: Negative for chest pain, palpitations and leg swelling. Gastrointestinal: Negative for abdominal pain, blood in stool, diarrhea, nausea and vomiting. Genitourinary: Negative for difficulty urinating and dysuria. Musculoskeletal: Negative for arthralgias and back pain. Neurological: Negative for weakness, light-headedness and headaches. Hematological: Negative for adenopathy. Does not bruise/bleed easily. Physical Exam     Vitals:    02/20/20 1256 02/20/20 1308 02/20/20 1315 02/20/20 1400   BP:   134/62 123/59   Pulse:       Resp:       Temp:       SpO2: 96% 98% 99% 98%   Weight:       Height:         Physical Exam  Vitals signs and nursing note reviewed. Constitutional:       General: He is not in acute distress. Appearance: He is well-developed. He is not toxic-appearing. HENT:      Head: Normocephalic and atraumatic. Eyes:      Conjunctiva/sclera: Conjunctivae normal.      Pupils: Pupils are equal, round, and reactive to light. Neck:      Musculoskeletal: Normal range of motion and neck supple. Cardiovascular:      Rate and Rhythm: Normal rate and regular rhythm. Heart sounds: Normal heart sounds. Pulmonary:      Effort: Pulmonary effort is normal. No respiratory distress. Breath sounds: Wheezing (end expiratory ) present. No rales. Chest:      Chest wall: No tenderness. Abdominal:      General: There is no distension. Palpations: Abdomen is soft. Tenderness: There is no abdominal tenderness. There is no guarding or rebound. Musculoskeletal: Normal range of motion. General: No tenderness. Lymphadenopathy:      Cervical: No cervical adenopathy. Skin:     General: Skin is warm and dry. Neurological:      Mental Status: He is alert and oriented to person, place, and time. Cranial Nerves: No cranial nerve deficit. Motor: No abnormal muscle tone.       Coordination: Coordination normal.   Psychiatric:         Behavior: Behavior normal.         Diagnostic Study Results     Labs -     Recent Results (from the past 12 hour(s))   METABOLIC PANEL, COMPREHENSIVE    Collection Time: 02/20/20 12:30 PM   Result Value Ref Range    Sodium 134 (L) 136 - 145 mmol/L    Potassium 4.2 3.5 - 5.5 mmol/L    Chloride 99 (L) 100 - 111 mmol/L    CO2 28 21 - 32 mmol/L    Anion gap 7 3.0 - 18 mmol/L Glucose 85 74 - 99 mg/dL    BUN 16 7.0 - 18 MG/DL    Creatinine 1.09 0.6 - 1.3 MG/DL    BUN/Creatinine ratio 15 12 - 20      GFR est AA >60 >60 ml/min/1.73m2    GFR est non-AA >60 >60 ml/min/1.73m2    Calcium 8.9 8.5 - 10.1 MG/DL    Bilirubin, total 0.4 0.2 - 1.0 MG/DL    ALT (SGPT) 14 (L) 16 - 61 U/L    AST (SGOT) 10 10 - 38 U/L    Alk. phosphatase 104 45 - 117 U/L    Protein, total 6.5 6.4 - 8.2 g/dL    Albumin 3.3 (L) 3.4 - 5.0 g/dL    Globulin 3.2 2.0 - 4.0 g/dL    A-G Ratio 1.0 0.8 - 1.7     CBC WITH AUTOMATED DIFF    Collection Time: 02/20/20 12:30 PM   Result Value Ref Range    WBC 12.3 4.6 - 13.2 K/uL    RBC 3.87 (L) 4.70 - 5.50 M/uL    HGB 10.9 (L) 13.0 - 16.0 g/dL    HCT 33.5 (L) 36.0 - 48.0 %    MCV 86.6 74.0 - 97.0 FL    MCH 28.2 24.0 - 34.0 PG    MCHC 32.5 31.0 - 37.0 g/dL    RDW 13.8 11.6 - 14.5 %    PLATELET 072 377 - 148 K/uL    MPV 9.0 (L) 9.2 - 11.8 FL    NEUTROPHILS 67 40 - 73 %    LYMPHOCYTES 8 (L) 21 - 52 %    MONOCYTES 11 (H) 3 - 10 %    EOSINOPHILS 13 (H) 0 - 5 %    BASOPHILS 1 0 - 2 %    ABS. NEUTROPHILS 8.4 (H) 1.8 - 8.0 K/UL    ABS. LYMPHOCYTES 1.0 0.9 - 3.6 K/UL    ABS. MONOCYTES 1.3 (H) 0.05 - 1.2 K/UL    ABS. EOSINOPHILS 1.6 (H) 0.0 - 0.4 K/UL    ABS.  BASOPHILS 0.1 0.0 - 0.1 K/UL    DF AUTOMATED     CARDIAC PANEL,(CK, CKMB & TROPONIN)    Collection Time: 02/20/20 12:30 PM   Result Value Ref Range     39 - 308 U/L    CK - MB 4.5 (H) <3.6 ng/ml    CK-MB Index 4.5 (H) 0.0 - 4.0 %    Troponin-I, QT <0.02 0.0 - 0.045 NG/ML   EKG, 12 LEAD, INITIAL    Collection Time: 02/20/20 12:30 PM   Result Value Ref Range    Ventricular Rate 97 BPM    Atrial Rate 97 BPM    P-R Interval 162 ms    QRS Duration 82 ms    Q-T Interval 332 ms    QTC Calculation (Bezet) 421 ms    Calculated P Axis 48 degrees    Calculated R Axis 24 degrees    Calculated T Axis 50 degrees    Diagnosis       Normal sinus rhythm  Normal ECG  When compared with ECG of 18-JAN-2020 15:05,  fusion complexes are no longer present         Radiologic Studies -   XR CHEST PORT   Final Result   Impression:      No acute cardiopulmonary disease or significant change. Right pleural   calcifications and parenchymal scarring, likely sequela of complex effusion. CT Results  (Last 48 hours)    None        CXR Results  (Last 48 hours)               02/20/20 1238  XR CHEST PORT Final result    Impression:  Impression:       No acute cardiopulmonary disease or significant change. Right pleural   calcifications and parenchymal scarring, likely sequela of complex effusion. Narrative:  CHEST AP PORTABLE       Indication: Shortness of breath. Comparison: X-ray 01/18/2020 and correlation with chest CT 10/31/2019. Findings: Well demarcated density in the right midlung corresponds to anterior   calcified pleural lesion. Persistent streaky hazy opacity in the right lower   lung base corresponding to pleural calcification and adjacent alveolar opacity   without appreciable change. Left lungs appear clear. The cardiac silhouette and   pulmonary vascularity appear within normal limits. No evidence for pneumothorax   or left pleural effusion. Medications given in the ED-  Medications   sodium chloride (NS) flush 5-40 mL (has no administration in time range)   sodium chloride (NS) flush 5-40 mL (has no administration in time range)   albuterol-ipratropium (DUO-NEB) 2.5 MG-0.5 MG/3 ML (3 mL Nebulization Given 2/20/20 1238)   albuterol-ipratropium (DUO-NEB) 2.5 MG-0.5 MG/3 ML (3 mL Nebulization Given 2/20/20 1321)   albuterol-ipratropium (DUO-NEB) 2.5 MG-0.5 MG/3 ML (3 mL Nebulization Given 2/20/20 1321)   albuterol-ipratropium (DUO-NEB) 2.5 MG-0.5 MG/3 ML (3 mL Nebulization Given 2/20/20 1320)   methylPREDNISolone (PF) (Solu-MEDROL) injection 125 mg (125 mg IntraVENous Given 2/20/20 1320)         Medical Decision Making   I am the first provider for this patient.     I reviewed the vital signs, available nursing notes, past medical history, past surgical history, family history and social history. Vital Signs-Reviewed the patient's vital signs. Pulse Oximetry Analysis - 98% on 2l     Cardiac Monitor:  Rate: 98 bpm  Rhythm: NSR    EKG interpretation: (Preliminary)  EKG read by Dr. Janel Soto at 1240   Normal sinus rhythm rate of 97, normal intervals, normal axis, unremarkable EKG    Records Reviewed: Nursing Notes, Old Medical Records, Previous Radiology Studies and Previous Laboratory Studies    Provider Notes (Medical Decision Making): Stephane Viera is a 66 y.o. male who presents with shortness of breath symptoms and exam consistent with acute on chronic COPD exacerbation. Currently stable on 2 L nasal cannula but will plan on additional nebulizer treatment and steroid administration suspect patient will be most likely suitable for discharge with outpatient follow-up. Blood work demonstrates no leukocytosis, hemoglobin improved since baseline  Chemistry demonstrates minor hyponatremia but is otherwise unremarkable. Negative cardiac enzymes. Chest x-ray shows chronic emphysema with no acute findings. EKG nonischemic. Procedures:  Procedures    ED Course:   2:39 PM patient feels better. Minor headache after albuterol administration. Clinically stable. Satting adequately on nasal cannula and at his baseline. Repeat lung exam demonstrates good air movement with some expiratory wheezes and rhonchi. Discussed strict return precautions. Will discharge with prednisone course. He has nebulizer therapy at home. Patient was with his wife who helps him as well. Diagnosis and Disposition     Critical Care:    DISCHARGE NOTE:    Renetta Park's  results have been reviewed with him. He has been counseled regarding his diagnosis, treatment, and plan. He verbally conveys understanding and agreement of the signs, symptoms, diagnosis, treatment and prognosis and additionally agrees to follow up as discussed. He also agrees with the care-plan and conveys that all of his questions have been answered. I have also provided discharge instructions for him that include: educational information regarding their diagnosis and treatment, and list of reasons why they would want to return to the ED prior to their follow-up appointment, should his condition change. He has been provided with education for proper emergency department utilization. CLINICAL IMPRESSION:    1. COPD exacerbation ([address]        PLAN:  1. D/C Home  2. Current Discharge Medication List      START taking these medications    Details   predniSONE (DELTASONE) 50 mg tablet Take 1 Tab by mouth daily for 4 days. Qty: 4 Tab, Refills: 0           3. Follow-up Information     Follow up With Specialties Details Why Contact Info    Lily Weaver MD Family Practice Schedule an appointment as soon as possible for a visit   [address]  [phone]          _______________________________      Please note that this dictation was completed with [employer], the computer voice recognition software. Quite often unanticipated grammatical, syntax, homophones, and other interpretive errors are inadvertently transcribed by the computer software. Please disregard these errors. Please excuse any errors that have escaped final proofreading.

## 2020-02-21 ENCOUNTER — APPOINTMENT (OUTPATIENT)
Dept: GENERAL RADIOLOGY | Age: 75
DRG: 189 | End: 2020-02-21
Attending: EMERGENCY MEDICINE
Payer: MEDICARE

## 2020-02-21 ENCOUNTER — HOSPITAL ENCOUNTER (INPATIENT)
Age: 75
LOS: 4 days | Discharge: HOME | DRG: 189 | End: 2020-02-25
Attending: EMERGENCY MEDICINE | Admitting: FAMILY MEDICINE
Payer: MEDICARE

## 2020-02-21 DIAGNOSIS — E87.1 HYPONATREMIA: ICD-10-CM

## 2020-02-21 DIAGNOSIS — D72.829 LEUKOCYTOSIS, UNSPECIFIED TYPE: ICD-10-CM

## 2020-02-21 DIAGNOSIS — A41.9 SEPSIS, DUE TO UNSPECIFIED ORGANISM, UNSPECIFIED WHETHER ACUTE ORGAN DYSFUNCTION PRESENT (HCC): ICD-10-CM

## 2020-02-21 DIAGNOSIS — J44.1 ACUTE EXACERBATION OF CHRONIC OBSTRUCTIVE PULMONARY DISEASE (COPD) (HCC): ICD-10-CM

## 2020-02-21 DIAGNOSIS — J22 LOWER RESPIRATORY INFECTION: ICD-10-CM

## 2020-02-21 DIAGNOSIS — J96.00 ACUTE RESPIRATORY FAILURE, UNSPECIFIED WHETHER WITH HYPOXIA OR HYPERCAPNIA (HCC): Primary | ICD-10-CM

## 2020-02-21 DIAGNOSIS — F17.200 TOBACCO DEPENDENCE: ICD-10-CM

## 2020-02-21 PROBLEM — N17.9 ACUTE-ON-CHRONIC KIDNEY INJURY (HCC): Status: ACTIVE | Noted: 2020-02-21

## 2020-02-21 PROBLEM — N18.9 ACUTE-ON-CHRONIC KIDNEY INJURY (HCC): Status: ACTIVE | Noted: 2020-02-21

## 2020-02-21 LAB
ALBUMIN SERPL-MCNC: 3.6 G/DL (ref 3.4–5)
ALBUMIN/GLOB SERPL: 1.1 {RATIO} (ref 0.8–1.7)
ALP SERPL-CCNC: 102 U/L (ref 45–117)
ALT SERPL-CCNC: 13 U/L (ref 16–61)
ANION GAP SERPL CALC-SCNC: 9 MMOL/L (ref 3–18)
ARTERIAL PATENCY WRIST A: YES
AST SERPL-CCNC: 11 U/L (ref 10–38)
BASE DEFICIT BLD-SCNC: 2 MMOL/L
BASOPHILS # BLD: 0 K/UL (ref 0–0.1)
BASOPHILS NFR BLD: 0 % (ref 0–3)
BDY SITE: ABNORMAL
BILIRUB SERPL-MCNC: 0.4 MG/DL (ref 0.2–1)
BNP SERPL-MCNC: 559 PG/ML (ref 0–1800)
BUN SERPL-MCNC: 24 MG/DL (ref 7–18)
BUN/CREAT SERPL: 20 (ref 12–20)
CALCIUM SERPL-MCNC: 8.7 MG/DL (ref 8.5–10.1)
CHLORIDE SERPL-SCNC: 95 MMOL/L (ref 100–111)
CK MB CFR SERPL CALC: 5.5 % (ref 0–4)
CK MB SERPL-MCNC: 7.1 NG/ML (ref 5–25)
CK SERPL-CCNC: 130 U/L (ref 39–308)
CO2 SERPL-SCNC: 25 MMOL/L (ref 21–32)
CREAT SERPL-MCNC: 1.21 MG/DL (ref 0.6–1.3)
DIFFERENTIAL METHOD BLD: ABNORMAL
EOSINOPHIL # BLD: 0 K/UL (ref 0–0.4)
EOSINOPHIL NFR BLD: 0 % (ref 0–5)
ERYTHROCYTE [DISTWIDTH] IN BLOOD BY AUTOMATED COUNT: 13.6 % (ref 11.6–14.5)
GAS FLOW.O2 O2 DELIVERY SYS: ABNORMAL L/MIN
GLOBULIN SER CALC-MCNC: 3.3 G/DL (ref 2–4)
GLUCOSE BLD STRIP.AUTO-MCNC: 170 MG/DL (ref 70–110)
GLUCOSE SERPL-MCNC: 138 MG/DL (ref 74–99)
HCO3 BLD-SCNC: 23.8 MMOL/L (ref 22–26)
HCT VFR BLD AUTO: 33.4 % (ref 36–48)
HGB BLD-MCNC: 11.1 G/DL (ref 13–16)
LACTATE SERPL-SCNC: 1.4 MMOL/L (ref 0.4–2)
LYMPHOCYTES # BLD: 0.5 K/UL (ref 0.8–3.5)
LYMPHOCYTES NFR BLD: 3 % (ref 20–51)
MCH RBC QN AUTO: 28.3 PG (ref 24–34)
MCHC RBC AUTO-ENTMCNC: 33.2 G/DL (ref 31–37)
MCV RBC AUTO: 85.2 FL (ref 74–97)
MONOCYTES # BLD: 1.1 K/UL (ref 0–1)
MONOCYTES NFR BLD: 6 % (ref 2–9)
NEUTS BAND NFR BLD MANUAL: 1 % (ref 0–5)
NEUTS SEG # BLD: 16.2 K/UL (ref 1.8–8)
NEUTS SEG NFR BLD: 90 % (ref 42–75)
O2/TOTAL GAS SETTING VFR VENT: 35 %
PCO2 BLD: 44.5 MMHG (ref 35–45)
PEEP RESPIRATORY: 6 CMH2O
PH BLD: 7.34 [PH] (ref 7.35–7.45)
PIP ISTAT,IPIP: 12
PLATELET # BLD AUTO: 415 K/UL (ref 135–420)
PMV BLD AUTO: 8.9 FL (ref 9.2–11.8)
PO2 BLD: 176 MMHG (ref 80–100)
POTASSIUM SERPL-SCNC: 4.8 MMOL/L (ref 3.5–5.5)
PROT SERPL-MCNC: 6.9 G/DL (ref 6.4–8.2)
RBC # BLD AUTO: 3.92 M/UL (ref 4.7–5.5)
RBC MORPH BLD: ABNORMAL
SAO2 % BLD: 99 % (ref 92–97)
SERVICE CMNT-IMP: ABNORMAL
SODIUM SERPL-SCNC: 129 MMOL/L (ref 136–145)
SPECIMEN TYPE: ABNORMAL
SPONTANEOUS TIMED, IST: YES
TOTAL RESP. RATE, ITRR: 26
TROPONIN I SERPL-MCNC: 0.02 NG/ML (ref 0–0.04)
WBC # BLD AUTO: 18 K/UL (ref 4.6–13.2)

## 2020-02-21 PROCEDURE — 94640 AIRWAY INHALATION TREATMENT: CPT

## 2020-02-21 PROCEDURE — 74011000250 HC RX REV CODE- 250: Performed by: EMERGENCY MEDICINE

## 2020-02-21 PROCEDURE — 36600 WITHDRAWAL OF ARTERIAL BLOOD: CPT

## 2020-02-21 PROCEDURE — 83880 ASSAY OF NATRIURETIC PEPTIDE: CPT

## 2020-02-21 PROCEDURE — 82550 ASSAY OF CK (CPK): CPT

## 2020-02-21 PROCEDURE — 96365 THER/PROPH/DIAG IV INF INIT: CPT

## 2020-02-21 PROCEDURE — 74011636637 HC RX REV CODE- 636/637: Performed by: FAMILY MEDICINE

## 2020-02-21 PROCEDURE — 65610000006 HC RM INTENSIVE CARE

## 2020-02-21 PROCEDURE — 74011250636 HC RX REV CODE- 250/636: Performed by: FAMILY MEDICINE

## 2020-02-21 PROCEDURE — 77030013140 HC MSK NEB VYRM -A

## 2020-02-21 PROCEDURE — 5A09357 ASSISTANCE WITH RESPIRATORY VENTILATION, LESS THAN 24 CONSECUTIVE HOURS, CONTINUOUS POSITIVE AIRWAY PRESSURE: ICD-10-PCS | Performed by: FAMILY MEDICINE

## 2020-02-21 PROCEDURE — 96375 TX/PRO/DX INJ NEW DRUG ADDON: CPT

## 2020-02-21 PROCEDURE — 82962 GLUCOSE BLOOD TEST: CPT

## 2020-02-21 PROCEDURE — 74011000250 HC RX REV CODE- 250: Performed by: FAMILY MEDICINE

## 2020-02-21 PROCEDURE — 87040 BLOOD CULTURE FOR BACTERIA: CPT

## 2020-02-21 PROCEDURE — 94762 N-INVAS EAR/PLS OXIMTRY CONT: CPT

## 2020-02-21 PROCEDURE — 77010033678 HC OXYGEN DAILY

## 2020-02-21 PROCEDURE — 74011250636 HC RX REV CODE- 250/636: Performed by: EMERGENCY MEDICINE

## 2020-02-21 PROCEDURE — 77030035694 HC MSK BIPAP FLL FAC PERFMAX PHIL -B

## 2020-02-21 PROCEDURE — 80053 COMPREHEN METABOLIC PANEL: CPT

## 2020-02-21 PROCEDURE — 74011000258 HC RX REV CODE- 258: Performed by: EMERGENCY MEDICINE

## 2020-02-21 PROCEDURE — 83605 ASSAY OF LACTIC ACID: CPT

## 2020-02-21 PROCEDURE — 93005 ELECTROCARDIOGRAM TRACING: CPT

## 2020-02-21 PROCEDURE — 99285 EMERGENCY DEPT VISIT HI MDM: CPT

## 2020-02-21 PROCEDURE — 85025 COMPLETE CBC W/AUTO DIFF WBC: CPT

## 2020-02-21 PROCEDURE — 82803 BLOOD GASES ANY COMBINATION: CPT

## 2020-02-21 PROCEDURE — 94660 CPAP INITIATION&MGMT: CPT

## 2020-02-21 PROCEDURE — 71045 X-RAY EXAM CHEST 1 VIEW: CPT

## 2020-02-21 RX ORDER — SODIUM CHLORIDE 0.9 % (FLUSH) 0.9 %
5-40 SYRINGE (ML) INJECTION AS NEEDED
Status: DISCONTINUED | OUTPATIENT
Start: 2020-02-21 | End: 2020-02-25 | Stop reason: HOSPADM

## 2020-02-21 RX ORDER — IPRATROPIUM BROMIDE AND ALBUTEROL SULFATE 2.5; .5 MG/3ML; MG/3ML
3 SOLUTION RESPIRATORY (INHALATION)
Status: COMPLETED | OUTPATIENT
Start: 2020-02-21 | End: 2020-02-21

## 2020-02-21 RX ORDER — MAGNESIUM SULFATE HEPTAHYDRATE 40 MG/ML
2 INJECTION, SOLUTION INTRAVENOUS ONCE
Status: COMPLETED | OUTPATIENT
Start: 2020-02-21 | End: 2020-02-21

## 2020-02-21 RX ORDER — LEVOFLOXACIN 5 MG/ML
750 INJECTION, SOLUTION INTRAVENOUS EVERY 24 HOURS
Status: DISCONTINUED | OUTPATIENT
Start: 2020-02-21 | End: 2020-02-21

## 2020-02-21 RX ORDER — HEPARIN SODIUM 5000 [USP'U]/ML
5000 INJECTION, SOLUTION INTRAVENOUS; SUBCUTANEOUS EVERY 8 HOURS
Status: DISCONTINUED | OUTPATIENT
Start: 2020-02-22 | End: 2020-02-25 | Stop reason: HOSPADM

## 2020-02-21 RX ORDER — SODIUM CHLORIDE 0.9 % (FLUSH) 0.9 %
5-10 SYRINGE (ML) INJECTION AS NEEDED
Status: DISCONTINUED | OUTPATIENT
Start: 2020-02-21 | End: 2020-02-25 | Stop reason: HOSPADM

## 2020-02-21 RX ORDER — IPRATROPIUM BROMIDE AND ALBUTEROL SULFATE 2.5; .5 MG/3ML; MG/3ML
3 SOLUTION RESPIRATORY (INHALATION)
Status: DISCONTINUED | OUTPATIENT
Start: 2020-02-21 | End: 2020-02-23

## 2020-02-21 RX ORDER — TAMSULOSIN HYDROCHLORIDE 0.4 MG/1
0.4 CAPSULE ORAL DAILY
Status: DISCONTINUED | OUTPATIENT
Start: 2020-02-22 | End: 2020-02-25 | Stop reason: HOSPADM

## 2020-02-21 RX ORDER — ONDANSETRON 2 MG/ML
4 INJECTION INTRAMUSCULAR; INTRAVENOUS
Status: DISCONTINUED | OUTPATIENT
Start: 2020-02-21 | End: 2020-02-25 | Stop reason: HOSPADM

## 2020-02-21 RX ORDER — INSULIN LISPRO 100 [IU]/ML
INJECTION, SOLUTION INTRAVENOUS; SUBCUTANEOUS EVERY 6 HOURS
Status: DISCONTINUED | OUTPATIENT
Start: 2020-02-22 | End: 2020-02-25 | Stop reason: HOSPADM

## 2020-02-21 RX ORDER — IPRATROPIUM BROMIDE AND ALBUTEROL SULFATE 2.5; .5 MG/3ML; MG/3ML
3 SOLUTION RESPIRATORY (INHALATION)
Status: DISPENSED | OUTPATIENT
Start: 2020-02-21 | End: 2020-02-22

## 2020-02-21 RX ORDER — SODIUM CHLORIDE 0.9 % (FLUSH) 0.9 %
5-40 SYRINGE (ML) INJECTION EVERY 8 HOURS
Status: DISCONTINUED | OUTPATIENT
Start: 2020-02-21 | End: 2020-02-25 | Stop reason: HOSPADM

## 2020-02-21 RX ORDER — SODIUM CHLORIDE 9 MG/ML
50 INJECTION, SOLUTION INTRAVENOUS CONTINUOUS
Status: DISCONTINUED | OUTPATIENT
Start: 2020-02-21 | End: 2020-02-23

## 2020-02-21 RX ADMIN — SODIUM CHLORIDE 100 ML/HR: 900 INJECTION, SOLUTION INTRAVENOUS at 23:49

## 2020-02-21 RX ADMIN — MAGNESIUM SULFATE HEPTAHYDRATE 2 G: 40 INJECTION, SOLUTION INTRAVENOUS at 18:26

## 2020-02-21 RX ADMIN — SODIUM CHLORIDE 1000 ML: 900 INJECTION, SOLUTION INTRAVENOUS at 18:29

## 2020-02-21 RX ADMIN — PIPERACILLIN AND TAZOBACTAM 4.5 G: 4; .5 INJECTION, POWDER, FOR SOLUTION INTRAVENOUS at 19:54

## 2020-02-21 RX ADMIN — AZITHROMYCIN MONOHYDRATE 500 MG: 500 INJECTION, POWDER, LYOPHILIZED, FOR SOLUTION INTRAVENOUS at 23:49

## 2020-02-21 RX ADMIN — IPRATROPIUM BROMIDE AND ALBUTEROL SULFATE 3 ML: .5; 3 SOLUTION RESPIRATORY (INHALATION) at 22:26

## 2020-02-21 RX ADMIN — INSULIN LISPRO 2 UNITS: 100 INJECTION, SOLUTION INTRAVENOUS; SUBCUTANEOUS at 23:50

## 2020-02-21 RX ADMIN — IPRATROPIUM BROMIDE AND ALBUTEROL SULFATE 3 ML: .5; 3 SOLUTION RESPIRATORY (INHALATION) at 18:50

## 2020-02-21 RX ADMIN — LEVOFLOXACIN 750 MG: 750 INJECTION, SOLUTION INTRAVENOUS at 19:53

## 2020-02-21 RX ADMIN — METHYLPREDNISOLONE SODIUM SUCCINATE 125 MG: 125 INJECTION, POWDER, FOR SOLUTION INTRAMUSCULAR; INTRAVENOUS at 18:26

## 2020-02-21 RX ADMIN — IPRATROPIUM BROMIDE AND ALBUTEROL SULFATE 3 ML: .5; 3 SOLUTION RESPIRATORY (INHALATION) at 18:37

## 2020-02-21 NOTE — ED TRIAGE NOTES
Pt brought in by EMS for COPD exacerbation. Pt was seen yesterday for same thing but has gooten worse over the course of the day. Pt was given 125 mg solumedrol and A&A treatment.

## 2020-02-22 PROBLEM — N18.2 CHRONIC KIDNEY DISEASE, STAGE 2 (MILD): Status: ACTIVE | Noted: 2020-02-22

## 2020-02-22 PROBLEM — J90 PLEURAL EFFUSION, RIGHT: Status: ACTIVE | Noted: 2020-02-22

## 2020-02-22 LAB
ALBUMIN SERPL-MCNC: 2.9 G/DL (ref 3.4–5)
ALBUMIN/GLOB SERPL: 0.8 {RATIO} (ref 0.8–1.7)
ALP SERPL-CCNC: 81 U/L (ref 45–117)
ALT SERPL-CCNC: 13 U/L (ref 16–61)
ANION GAP SERPL CALC-SCNC: 10 MMOL/L (ref 3–18)
APPEARANCE UR: CLEAR
AST SERPL-CCNC: 12 U/L (ref 10–38)
ATRIAL RATE: 121 BPM
BILIRUB SERPL-MCNC: 0.2 MG/DL (ref 0.2–1)
BILIRUB UR QL: NEGATIVE
BUN SERPL-MCNC: 24 MG/DL (ref 7–18)
BUN/CREAT SERPL: 21 (ref 12–20)
CALCIUM SERPL-MCNC: 8.6 MG/DL (ref 8.5–10.1)
CALCULATED P AXIS, ECG09: 79 DEGREES
CALCULATED R AXIS, ECG10: 38 DEGREES
CALCULATED T AXIS, ECG11: 39 DEGREES
CHLORIDE SERPL-SCNC: 99 MMOL/L (ref 100–111)
CO2 SERPL-SCNC: 24 MMOL/L (ref 21–32)
COLOR UR: YELLOW
CREAT SERPL-MCNC: 1.12 MG/DL (ref 0.6–1.3)
DIAGNOSIS, 93000: NORMAL
ERYTHROCYTE [DISTWIDTH] IN BLOOD BY AUTOMATED COUNT: 13.5 % (ref 11.6–14.5)
FLUAV AG NPH QL IA: NEGATIVE
FLUBV AG NOSE QL IA: NEGATIVE
GLOBULIN SER CALC-MCNC: 3.6 G/DL (ref 2–4)
GLUCOSE BLD STRIP.AUTO-MCNC: 134 MG/DL (ref 70–110)
GLUCOSE BLD STRIP.AUTO-MCNC: 141 MG/DL (ref 70–110)
GLUCOSE BLD STRIP.AUTO-MCNC: 143 MG/DL (ref 70–110)
GLUCOSE BLD STRIP.AUTO-MCNC: 197 MG/DL (ref 70–110)
GLUCOSE SERPL-MCNC: 138 MG/DL (ref 74–99)
GLUCOSE UR STRIP.AUTO-MCNC: NEGATIVE MG/DL
HCT VFR BLD AUTO: 30.3 % (ref 36–48)
HGB BLD-MCNC: 9.9 G/DL (ref 13–16)
HGB UR QL STRIP: NEGATIVE
KETONES UR QL STRIP.AUTO: NEGATIVE MG/DL
LEUKOCYTE ESTERASE UR QL STRIP.AUTO: NEGATIVE
MAGNESIUM SERPL-MCNC: 2.5 MG/DL (ref 1.6–2.6)
MCH RBC QN AUTO: 27.9 PG (ref 24–34)
MCHC RBC AUTO-ENTMCNC: 32.7 G/DL (ref 31–37)
MCV RBC AUTO: 85.4 FL (ref 74–97)
NITRITE UR QL STRIP.AUTO: NEGATIVE
P-R INTERVAL, ECG05: 158 MS
PH UR STRIP: 5 [PH] (ref 5–8)
PLATELET # BLD AUTO: 290 K/UL (ref 135–420)
PMV BLD AUTO: 8.6 FL (ref 9.2–11.8)
POTASSIUM SERPL-SCNC: 4.2 MMOL/L (ref 3.5–5.5)
PROT SERPL-MCNC: 6.5 G/DL (ref 6.4–8.2)
PROT UR STRIP-MCNC: NEGATIVE MG/DL
Q-T INTERVAL, ECG07: 302 MS
QRS DURATION, ECG06: 74 MS
QTC CALCULATION (BEZET), ECG08: 428 MS
RBC # BLD AUTO: 3.55 M/UL (ref 4.7–5.5)
SODIUM SERPL-SCNC: 133 MMOL/L (ref 136–145)
SP GR UR REFRACTOMETRY: 1.01 (ref 1–1.03)
UROBILINOGEN UR QL STRIP.AUTO: 0.2 EU/DL (ref 0.2–1)
VENTRICULAR RATE, ECG03: 121 BPM
WBC # BLD AUTO: 12.5 K/UL (ref 4.6–13.2)

## 2020-02-22 PROCEDURE — 85027 COMPLETE CBC AUTOMATED: CPT

## 2020-02-22 PROCEDURE — 74011250636 HC RX REV CODE- 250/636: Performed by: FAMILY MEDICINE

## 2020-02-22 PROCEDURE — 82962 GLUCOSE BLOOD TEST: CPT

## 2020-02-22 PROCEDURE — 81003 URINALYSIS AUTO W/O SCOPE: CPT

## 2020-02-22 PROCEDURE — 94640 AIRWAY INHALATION TREATMENT: CPT

## 2020-02-22 PROCEDURE — 77030013140 HC MSK NEB VYRM -A

## 2020-02-22 PROCEDURE — 74011636637 HC RX REV CODE- 636/637: Performed by: FAMILY MEDICINE

## 2020-02-22 PROCEDURE — 74011000250 HC RX REV CODE- 250: Performed by: INTERNAL MEDICINE

## 2020-02-22 PROCEDURE — 83735 ASSAY OF MAGNESIUM: CPT

## 2020-02-22 PROCEDURE — 94660 CPAP INITIATION&MGMT: CPT

## 2020-02-22 PROCEDURE — 80053 COMPREHEN METABOLIC PANEL: CPT

## 2020-02-22 PROCEDURE — 92610 EVALUATE SWALLOWING FUNCTION: CPT

## 2020-02-22 PROCEDURE — C9113 INJ PANTOPRAZOLE SODIUM, VIA: HCPCS | Performed by: FAMILY MEDICINE

## 2020-02-22 PROCEDURE — 36415 COLL VENOUS BLD VENIPUNCTURE: CPT

## 2020-02-22 PROCEDURE — 74011000250 HC RX REV CODE- 250: Performed by: FAMILY MEDICINE

## 2020-02-22 PROCEDURE — 87804 INFLUENZA ASSAY W/OPTIC: CPT

## 2020-02-22 PROCEDURE — 65660000000 HC RM CCU STEPDOWN

## 2020-02-22 PROCEDURE — 94760 N-INVAS EAR/PLS OXIMETRY 1: CPT

## 2020-02-22 PROCEDURE — 74011250636 HC RX REV CODE- 250/636: Performed by: INTERNAL MEDICINE

## 2020-02-22 PROCEDURE — 77010033678 HC OXYGEN DAILY

## 2020-02-22 PROCEDURE — 74011250637 HC RX REV CODE- 250/637: Performed by: FAMILY MEDICINE

## 2020-02-22 RX ORDER — BUDESONIDE 0.5 MG/2ML
500 INHALANT ORAL
Status: DISCONTINUED | OUTPATIENT
Start: 2020-02-22 | End: 2020-02-25 | Stop reason: HOSPADM

## 2020-02-22 RX ADMIN — IPRATROPIUM BROMIDE AND ALBUTEROL SULFATE 3 ML: .5; 3 SOLUTION RESPIRATORY (INHALATION) at 11:35

## 2020-02-22 RX ADMIN — DORNASE ALFA 2.5 MG: 1 SOLUTION RESPIRATORY (INHALATION) at 15:31

## 2020-02-22 RX ADMIN — BUDESONIDE 500 MCG: 0.5 INHALANT RESPIRATORY (INHALATION) at 19:54

## 2020-02-22 RX ADMIN — CEFTRIAXONE 1 G: 1 INJECTION, POWDER, FOR SOLUTION INTRAMUSCULAR; INTRAVENOUS at 14:52

## 2020-02-22 RX ADMIN — Medication 10 ML: at 23:33

## 2020-02-22 RX ADMIN — HEPARIN SODIUM 5000 UNITS: 5000 INJECTION INTRAVENOUS; SUBCUTANEOUS at 08:38

## 2020-02-22 RX ADMIN — AZITHROMYCIN MONOHYDRATE 500 MG: 500 INJECTION, POWDER, LYOPHILIZED, FOR SOLUTION INTRAVENOUS at 23:32

## 2020-02-22 RX ADMIN — METHYLPREDNISOLONE SODIUM SUCCINATE 60 MG: 125 INJECTION, POWDER, FOR SOLUTION INTRAMUSCULAR; INTRAVENOUS at 17:45

## 2020-02-22 RX ADMIN — IPRATROPIUM BROMIDE AND ALBUTEROL SULFATE 3 ML: .5; 3 SOLUTION RESPIRATORY (INHALATION) at 15:30

## 2020-02-22 RX ADMIN — HEPARIN SODIUM 5000 UNITS: 5000 INJECTION INTRAVENOUS; SUBCUTANEOUS at 16:10

## 2020-02-22 RX ADMIN — Medication 10 ML: at 14:00

## 2020-02-22 RX ADMIN — IPRATROPIUM BROMIDE AND ALBUTEROL SULFATE 3 ML: .5; 3 SOLUTION RESPIRATORY (INHALATION) at 04:47

## 2020-02-22 RX ADMIN — HEPARIN SODIUM 5000 UNITS: 5000 INJECTION INTRAVENOUS; SUBCUTANEOUS at 23:32

## 2020-02-22 RX ADMIN — METHYLPREDNISOLONE SODIUM SUCCINATE 60 MG: 125 INJECTION, POWDER, FOR SOLUTION INTRAMUSCULAR; INTRAVENOUS at 23:33

## 2020-02-22 RX ADMIN — DORNASE ALFA 2.5 MG: 1 SOLUTION RESPIRATORY (INHALATION) at 19:54

## 2020-02-22 RX ADMIN — HEPARIN SODIUM 5000 UNITS: 5000 INJECTION INTRAVENOUS; SUBCUTANEOUS at 00:42

## 2020-02-22 RX ADMIN — SODIUM CHLORIDE 100 ML/HR: 900 INJECTION, SOLUTION INTRAVENOUS at 10:22

## 2020-02-22 RX ADMIN — Medication 10 ML: at 06:28

## 2020-02-22 RX ADMIN — IPRATROPIUM BROMIDE AND ALBUTEROL SULFATE 3 ML: .5; 3 SOLUTION RESPIRATORY (INHALATION) at 07:20

## 2020-02-22 RX ADMIN — IPRATROPIUM BROMIDE AND ALBUTEROL SULFATE 3 ML: .5; 3 SOLUTION RESPIRATORY (INHALATION) at 00:46

## 2020-02-22 RX ADMIN — INSULIN LISPRO 2 UNITS: 100 INJECTION, SOLUTION INTRAVENOUS; SUBCUTANEOUS at 18:49

## 2020-02-22 RX ADMIN — SODIUM CHLORIDE 40 MG: 9 INJECTION, SOLUTION INTRAMUSCULAR; INTRAVENOUS; SUBCUTANEOUS at 08:38

## 2020-02-22 RX ADMIN — TAMSULOSIN HYDROCHLORIDE 0.4 MG: 0.4 CAPSULE ORAL at 08:39

## 2020-02-22 RX ADMIN — Medication 10 ML: at 00:42

## 2020-02-22 RX ADMIN — IPRATROPIUM BROMIDE AND ALBUTEROL SULFATE 3 ML: .5; 3 SOLUTION RESPIRATORY (INHALATION) at 19:54

## 2020-02-22 RX ADMIN — METHYLPREDNISOLONE SODIUM SUCCINATE 125 MG: 125 INJECTION, POWDER, FOR SOLUTION INTRAMUSCULAR; INTRAVENOUS at 06:27

## 2020-02-22 RX ADMIN — IPRATROPIUM BROMIDE AND ALBUTEROL SULFATE 3 ML: .5; 3 SOLUTION RESPIRATORY (INHALATION) at 23:49

## 2020-02-22 NOTE — PROGRESS NOTES
Ines Howell is a 66y.o. year old male receiving Ceftriaxone for CAP. Height:   Ht Readings from Last 1 Encounters:   20 172.7 cm (68\")    Weight:   Wt Readings from Last 1 Encounters:   20 90.7 kg (200 lb)        Estimated Creatinine Clearance: 61.3 mL/min (based on SCr of 1.12 mg/dL).     Current Antimicrobial Therapy (168h ago, onward)      Ordered     Start Stop    20 1330  cefTRIAXone (ROCEPHIN) 1 g in sterile water (preservative free) 10 mL IV syringe  1 g,   IntraVENous,   EVERY 24 HOURS      20 1400 --    20 2236  azithromycin (ZITHROMAX) 500 mg in 0.9% sodium chloride 250 mL (VIAL-MATE)  500 mg,   IntraVENous,   EVERY 24 HOURS      20 2300 20 2259              GRAM STAIN   Date Value Ref Range Status   2019 10-25 WBC/lpf   Final   2019 10-25 EPI/lpf   Final   2019 FEW GRAM POSITIVE COCCI IN PAIRS   Final   2019 FEW GRAM NEGATIVE RODS   Final   2019 FEW GRAM NEGATIVE DIPLOCOCCI   Final     Culture result:   Date Value Ref Range Status   2020 NO GROWTH AFTER 12 HOURS   Preliminary   2020 NO GROWTH AFTER 12 HOURS   Preliminary   10/30/2019 NO GROWTH 6 DAYS   Final   10/30/2019 NO GROWTH 6 DAYS   Final   2019 RARE STAPHYLOCOCCUS AUREUS (A)   Final   2019 FEW NORMAL RESPIRATORY ELLA   Final   2019 NO GROWTH 6 DAYS   Final       Temp (24hrs), Av.8 °F (36.6 °C), Min:97.3 °F (36.3 °C), Max:98.6 °F (37 °C)      Recent Labs     20  0440 20  1820 20  1230   WBC 12.5 18.0* 12.3       DOSING CHART:    *May consider 2G IV dose for gram negative infections  Site and Type of Infection Dose and Route Frequency   Urinary Tract Infection 1G IV Q 24 hours   Bacteremia 1-2G IV* Q 24 hours   Upper Respiratory Tract Infection (if appropriate) 1G IV Q 24 hours   Skin and Soft Tissue Infection 1G IV Q 24 hours   Intra-abdominal Infection    (usually add metronidazole) 1G IV Q 24 hours   Gonorrhea (usually with Azithromycin or Doxycycline to cover Chlamydia) 250mg IM   1G (disseminated) Once  Q 24 hours (disseminated)   Endocarditis 2G IV Q 24 hours (may use q 12 hours with ampicillin for Enterococcus sp.)   Meningitis and CNS infections 2G IV Q 12 hours   Pneumonia 1G IV Q 24 hours   Osteomyelitis or prosthetic joint infections 2G IV  Q 24 hours       Patients excluded from an automatic dose change are as follows:  - Doses ordered by an infectious diseases physician. If appropriate, the pharmacist will call the physician about concerns. - Patients weighing > 100 kg- pharmacist may use their professional judgment for specific patient dosing  - Patient has symptoms consistent with, or diagnosis of meningitis, CNS infection, endocarditis or osteomyelitis. -  - Pediatric patients    Per [facility] this patient's Ceftriaxone dose has been changed to 1 gm IV every 24 hours. Gonzales Carrizales, Pharm. D.   Clinical Pharmacist  [phone]

## 2020-02-22 NOTE — PROGRESS NOTES
0700 Assumed patient care from off-going nurse Jayce Bowen RN. Whiteboard updated, bed wheels locked, and call bell within reach. 0800  Assessment complete. Patient resting comfortably in bed. No complaint of pain or SOB at this time. Call bell within reach. 1200 Reassessment complete. Patient resting comfortably in bed. No complaint of pain or SOB at this time. Call bell within reach. 1600 Reassessment complete. Patient resting comfortably in bed. No complaint of pain or SOB at this time. Call bell within reach. 1715 TRANSFER - OUT REPORT:    Verbal report given to [place], RN(name) on Verlin Lean  being transferred to [facility] (unit) for routine progression of care       Report consisted of patients Situation, Background, Assessment and   Recommendations(SBAR). Information from the following report(s) SBAR, Kardex, [facility] and Recent Results was reviewed with the receiving nurse. Lines:   Peripheral IV 02/21/20 Anterior; Left Forearm (Active)   Site Assessment Clean, dry, & intact 2/22/2020  4:00 PM   Phlebitis Assessment 0 2/22/2020  4:00 PM   Infiltration Assessment 0 2/22/2020  4:00 PM   Dressing Status Clean, dry, & intact 2/22/2020  4:00 PM   Dressing Type Transparent;Tape 2/22/2020  4:00 PM   Hub Color/Line Status Pink;Flushed;Patent;Capped 2/22/2020  4:00 PM   Action Taken Open ports on tubing capped 2/22/2020  4:00 PM   Alcohol Cap Used Yes 2/22/2020  4:00 PM       Peripheral IV 02/21/20 Right Antecubital (Active)   Site Assessment Clean, dry, & intact 2/22/2020  4:00 PM   Phlebitis Assessment 0 2/22/2020  4:00 PM   Infiltration Assessment 0 2/22/2020  4:00 PM   Dressing Status Clean, dry, & intact 2/22/2020  4:00 PM   Dressing Type Transparent;Tape 2/22/2020  4:00 PM   Hub Color/Line Status Green;Flushed;Patent;Capped 2/22/2020  4:00 PM   Action Taken Open ports on tubing capped 2/22/2020  4:00 PM   Alcohol Cap Used Yes 2/22/2020  4:00 PM        Opportunity for questions and clarification was provided.       Patient transported with:   Monitor  O2 @ 2 liters

## 2020-02-22 NOTE — PROGRESS NOTES
Problem: Falls - Risk of  Goal: *Absence of Falls  Description  Document Clide Failing Fall Risk and appropriate interventions in the flowsheet.   Outcome: Progressing Towards Goal  Note: Fall Risk Interventions:  Mobility Interventions: Patient to call before getting OOB, Strengthening exercises (ROM-active/passive), Utilize walker, cane, or other assistive device         Medication Interventions: Patient to call before getting OOB, Teach patient to arise slowly    Elimination Interventions: Call light in reach, Patient to call for help with toileting needs              Problem: Patient Education: Go to Patient Education Activity  Goal: Patient/Family Education  Outcome: Progressing Towards Goal     Problem: Chronic Renal Failure  Goal: *Fluid and electrolytes stabilized  Outcome: Progressing Towards Goal     Problem: Chronic Obstructive Pulmonary Disease (COPD)  Goal: *Oxygen saturation during activity within specified parameters  Outcome: Progressing Towards Goal  Goal: *Able to remain out of bed as prescribed  Outcome: Progressing Towards Goal  Goal: *Absence of hypoxia  Outcome: Progressing Towards Goal  Goal: *Optimize nutritional status  Outcome: Progressing Towards Goal

## 2020-02-22 NOTE — PROGRESS NOTES
Problem: Dysphagia (Adult)  Goal: *Acute Goals and Plan of Care (Insert Text)  Description  Dysphagia Present: mild pharyngeal  Aspiration: none on b/s eval      Recommendations:  Diet: dental soft solids/ thin liquids, 2 swallows per sip thin  Meds: as tolerated  Aspiration Precautions  Oral Care TID    Goals:  Patient will:  1. Tolerate PO trials with no overt s/s aspiration or distress in 4/5 trials  2. Utilize compensatory swallow strategies/maneuvers (decrease bite/sip, size/rate, alt. liq/sol) with min cues in 4/5 trials  3. Complete an objective swallow study (i.e., MBSS) to assess swallow integrity, r/o aspiration, and determine of safest LRD, min A   Outcome: Progressing Towards Goal    SPEECH LANGUAGE PATHOLOGY BEDSIDE SWALLOW EVALUATION    Patient: Ana Baeza (77 y.o. male)  Date: 2/22/2020  Primary Diagnosis: Acute respiratory failure (HCC) [J96.00]        Precautions: aspiration       PLOF: as per H&P    ASSESSMENT :  Based on the objective data described below, the patient presents with mild pharyngeal dysphagia in setting of COPD exacerbation requiring BiPap. Pt awake, alert, hoarse vocal quality, suspect baseline, stated eating softer foods at home, \"Yeah, I don't have too many teeth\". Pt accepted dental soft solids and straw sips thin with timely mastication, good bolus formation/ control, timely A-P transit with good oral clearing, fairly timely swallow response with weak swallow response, almost like a slight hesitation in elevation, 2 swallows per sip thin, but no overt/ soft/ silent s/sx aspiration, Sp02 remained 94-95 throughout session. Rec: initiate dental soft solids/ thin liquids, aspiration precautions (HOB 60* for all po feeds and >/= 45* at least 30 minutes following, small bites/ sips, alternate solids/ liquids, decreased rate of intake, 2 swallows per sip liquid)  SLP will f/ 1-2 visits with diet tolerance/ safety.     Patient will benefit from skilled intervention to address the above impairments. Patient's rehabilitation potential is considered to be Good  Factors which may influence rehabilitation potential include:   []            None noted  []            Mental ability/status  [x]            Medical condition  []            Home/family situation and support systems  []            Safety awareness  []            Pain tolerance/management  []            Other:      PLAN :  Recommendations and Planned Interventions:  initiate dental soft solids/ thin liquids, aspiration precautions (HOB 60* for all po feeds and >/= 45* at least 30 minutes following, small bites/ sips, alternate solids/ liquids, decreased rate of intake, 2 swallows per sip liquid)  Frequency/Duration: Patient will be followed by speech-language pathology 1-2 times to address goals. Discharge Recommendations: To Be Determined     SUBJECTIVE:   Patient stated You can call in my order for me so I don't have to?    OBJECTIVE:     Past Medical History:   Diagnosis Date    Brain aneurysm     Cancer ([facility] [address]     prostate    COPD (chronic obstructive pulmonary disease) ([facility] [address]     Hypertension     Nocturia     Pneumonia     Radiation effect      Past Surgical History:   Procedure Laterality Date    HX HERNIA REPAIR       Home Situation:        Diet prior to admission: softer solids/ thin liquids  Current Diet:  full liquid     Cognitive and Communication Status:  Neurologic State: Alert  Orientation Level: Oriented to person, Oriented to place  Cognition: Follows commands  Perception: Appears intact  Perseveration: No perseveration noted  Safety/Judgement: Fall prevention  Oral Assessment:  Oral Assessment  Labial: No impairment  Dentition: Natural;Limited  Oral Hygiene: fair  Lingual: No impairment  Velum: No impairment  Mandible: No impairment  P.O. Trials:  Patient Position: HOB 60*  Vocal quality prior to P.O.: Hoarse  Consistency Presented: Thin liquid; Solid  How Presented: Self-fed/presented;Straw;Successive swallows Bolus Acceptance: No impairment  Bolus Formation/Control: No impairment     Propulsion: No impairment  Oral Residue: None  Initiation of Swallow: No impairment  Laryngeal Elevation: Weak  Aspiration Signs/Symptoms: None  Pharyngeal Phase Characteristics: No impairment, issues, or problems   Effective Modifications: Small sips and bites  Cues for Modifications: None       Oral Phase Severity: No impairment  Pharyngeal Phase Severity : Mild    PAIN:  Pain level pre-treatment: 0/10   Pain level post-treatment: 0/10   Pain Intervention(s): Medication (see MAR); Rest, Ice, Repositioning   Response to intervention: Nurse notified, See doc flow    After treatment:   []            Patient left in no apparent distress sitting up in chair  [x]            Patient left in no apparent distress in bed  [x]            Call bell left within reach  [x]            Nursing notified  []            Family present  []            Caregiver present  []            Bed alarm activated    COMMUNICATION/EDUCATION:   [x]            Aspiration precautions; swallow safety; compensatory techniques. [x]            Patient participated as able in goal setting and plan of care. []            Patient/family agree to work toward stated goals and plan of care. []            Patient understands intent and goals of therapy; neutral about participation. []            Patient unable to participate in goal setting/plan of care; educ ongoing with interdisciplinary staff  [x]         Posted safety precautions in patient's room.     Thank you for this referral,  Kassie Pickett MS, CCC/SLP  Time Calculation: 16 mins

## 2020-02-22 NOTE — PROGRESS NOTES
Hospitalist Progress Note    Patient: Ines Howell MRN: [id number]  CSN: [id number]    YOB: 1945  Age: 66 y.o. Sex: male    DOA: 2/21/2020 LOS:  LOS: 1 day                Assessment/Plan     Patient Active Problem List   Diagnosis Code    Hypertension I10    COPD (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 ([facility]) J44.9    Chronic renal disease, stage 3, moderately decreased glomerular filtration rate between 30-59 mL/min/1.73 square meter ([facility]) N18.3    Asbestosis ([facility] J61    Acute exacerbation of chronic obstructive pulmonary disease (COPD) ([facility] J44.1    Advanced care planning/counseling discussion Z71.89    Debility R53.81    Pneumonia of right lower lobe due to methicillin susceptible Staphylococcus aureus (MSSA) ([facility] J15.211    Paroxysmal atrial fibrillation ([facility]) I48.0    Chronic respiratory failure with hypoxia ([facility]) J96.11    COPD (chronic obstructive pulmonary disease) ([facility] J44.9    Acute on chronic respiratory failure with hypoxia and hypercapnia ([facility]) J96.21, J96.22    Atrial fibrillation with RVR ([facility]) I48.91    Acute respiratory failure ([facility]) J96.00    Tobacco dependence F17.200    Leukocytosis D72.829    Hyponatremia E87.1    Acute-on-chronic kidney injury ([facility] N17.9, N18.9    Chronic kidney disease, stage 2 (mild) N18.2    Pleural effusion, right J90        67 y/o male with hx of CKD, COPD, HTN, asbestosis, and PAF admitted for COPD exacerbation and acute respiratory failure requiring BiPAP. He also has hyponatremia likely from diuretic use. Acute on Chronic respiratory failure -   off  continue with home oxygen at 2L NC     COPD exacerbation - son Intravenous steroids. Inhaled short acting beta 2 agonist and anticholinergics. Empiric antibiotics.     He is  on chronic azithromycin.    Continue with ceftriaxone and azithromycin     PAF - NSR    URIEL -  Resolved.     HTN - controlled, monitor BP     DVT prophylaxis with heparin       Disposition :     Vital signs/Intake and Output:  Visit Vitals  /67   Pulse 99   Temp 98 °F (36.7 °C)   Resp 28   Ht 5' 8\" (1.727 m)   Wt 90.7 kg (200 lb)   SpO2 97%   BMI 30.41 kg/m²     Current Shift:  02/22 0701 - 02/22 1900  In: 753.3 [I.V.:753.3]  Out: 1150 [Urine:1150]  Last three shifts:  02/20 1901 - 02/22 0700  In: 50 [I.V.:50]  Out: 550 [Urine:550]            Labs: Results:       Chemistry Recent Labs     02/22/20 0440 02/21/20 1820 02/20/20  1230   * 138* 85   * 129* 134*   K 4.2 4.8 4.2   CL 99* 95* 99*   CO2 24 25 28   BUN 24* 24* 16   CREA 1.12 1.21 1.09   CA 8.6 8.7 8.9   AGAP 10 9 7   BUCR 21* 20 15   AP 81 102 104   TP 6.5 6.9 6.5   ALB 2.9* 3.6 3.3*   GLOB 3.6 3.3 3.2   AGRAT 0.8 1.1 1.0      CBC w/Diff Recent Labs     02/22/20 0440 02/21/20 1820 02/20/20  1230   WBC 12.5 18.0* 12.3   RBC 3.55* 3.92* 3.87*   HGB 9.9* 11.1* 10.9*   HCT 30.3* 33.4* 33.5*    415 320   GRANS  --  90* 67   LYMPH  --  3* 8*   EOS  --  0 13*      Cardiac Enzymes Recent Labs     02/21/20 1820 02/20/20  1230    101   CKND1 5.5* 4.5*      Coagulation No results for input(s): PTP, INR, APTT, INREXT in the last 72 hours. Lipid Panel No results found for: CHOL, CHOLPOCT, CHOLX, CHLST, CHOLV, 037300, HDL, HDLP, LDL, LDLC, DLDLP, 341043, VLDLC, VLDL, TGLX, TRIGL, TRIGP, TGLPOCT, CHHD, CHHDX   BNP No results for input(s): BNPP in the last 72 hours.    Liver Enzymes Recent Labs     02/22/20  0440   TP 6.5   ALB 2.9*   AP 81   SGOT 12      Thyroid Studies Lab Results   Component Value Date/Time    TSH 0.86 10/20/2019 01:05 AM        Procedures/imaging: see electronic medical records for all procedures/Xrays and details which were not copied into this note but were reviewed prior to creation of Plan

## 2020-02-22 NOTE — CONSULTS
[facility]  Pulmonary, Critical Care, and Sleep Medicine    Initial Patient Consult    Name: Rosa Chawla MRN: [id number]   : 1945 Hospital: [facility] [place]   Date: 2020  Room: [facility]/     Subjective: This patient has been seen and evaluated at the request of Dr. Mariana Herndon for COPD exacerbation. Patient is a 66 y.o. male - known to Dr Jeffry Hernandez in the office. He has chronic COPD, home o2 at 2 L, ex-smoker, used to work in the [employer], hx of asbestosis; hx of CKD and parox AF. He came to ER with worsening SOB yesterday. He needed BIPAP and was admitted to the icu. He is awake, alert; off BIPAP this am.  He has cough with thick green-yellow sputum. He has no chest pains or hemoptysis. He has frequent admissions for COPD exac. SH - stopped smoking 9 mths ago; used to smoke 1.5 ppd    Occup - used to work in [employer] mostly as , and other jobs      Past Medical History:   Diagnosis Date    Brain aneurysm     Cancer [facility] [address]     prostate    COPD (chronic obstructive pulmonary disease) [facility] [address]     Hypertension     Nocturia     Pneumonia     Radiation effect       Past Surgical History:   Procedure Laterality Date    HX HERNIA REPAIR        Prior to Admission medications    Medication Sig Start Date End Date Taking? Authorizing Provider   chlorthalidone (HYGROTEN) 25 mg tablet Take 25 mg by mouth daily. Other, MD Blayne   predniSONE (DELTASONE) 50 mg tablet Take 1 Tab by mouth daily for 4 days. 20  Laina Sanderson MD   acetaminophen (TYLENOL) 325 mg tablet Take 500 mg by mouth every four (4) hours as needed for Pain. Provider, Historical   OXYGEN-AIR DELIVERY SYSTEMS Take 2 L by inhalation continuous. Provider, Historical   dextran 70/hypromellose (ARTIFICIAL TEARS, PF, OP) Apply 2 Drops to eye as needed for Other (both eyes for dryness).     Provider, Historical   diphenhydrAMINE (BENADRYL) 25 mg capsule Take 25 mg by mouth nightly as needed for Itching. Provider, Renata   albuterol-ipratropium (DUO-NEB) 2.5 mg-0.5 mg/3 ml nebu 3 mL by Nebulization route every four (4) hours as needed. Patient taking differently: 3 mL by Nebulization route every four (4) hours as needed for Other (Shortness of breath). 2/9/19   Karely Coles,    albuterol (PROVENTIL HFA, VENTOLIN HFA, PROAIR HFA) 90 mcg/actuation inhaler Take 2 Puffs by inhalation every four (4) hours as needed for Wheezing or Shortness of Breath. 4/23/18   Darwin Bain PA-C   ipratropium (ATROVENT) 0.03 % nasal spray 2 Sprays by Both Nostrils route every twelve (12) hours as needed for Rhinitis. Other, MD Blayne   tamsulosin (FLOMAX) 0.4 mg capsule Take 0.4 mg by mouth every evening.     Shant, MD Blayne     Allergies   Allergen Reactions    Aspirin Other (comments)     \"messes my stomach up\"      Social History     Tobacco Use    Smoking status: Former Smoker     Types: Cigarettes    Smokeless tobacco: Never Used   Substance Use Topics    Alcohol use: No      Family History   Problem Relation Age of Onset    Heart Disease Mother         Current Facility-Administered Medications   Medication Dose Route Frequency    sodium chloride (NS) flush 5-40 mL  5-40 mL IntraVENous Q8H    methylPREDNISolone (PF) (Solu-MEDROL) injection 125 mg  125 mg IntraVENous Q8H    albuterol-ipratropium (DUO-NEB) 2.5 MG-0.5 MG/3 ML  3 mL Nebulization Q4H RT    pantoprazole (PROTONIX) 40 mg in 0.9% sodium chloride 10 mL injection  40 mg IntraVENous DAILY    0.9% sodium chloride infusion  100 mL/hr IntraVENous CONTINUOUS    azithromycin (ZITHROMAX) 500 mg in 0.9% sodium chloride 250 mL (VIAL-MATE)  500 mg IntraVENous Q24H    insulin lispro (HUMALOG) injection   SubCUTAneous Q6H    heparin (porcine) injection 5,000 Units  5,000 Units SubCUTAneous Q8H    tamsulosin (FLOMAX) capsule 0.4 mg  0.4 mg Oral DAILY       Review of Systems:  Limited due to patient condition   Severe hard of hearing  No CP or hemoptysis  No fever or chills  No abd pains or vomiting    Objective:   Vital Signs:    Visit Vitals  /67   Pulse 99   Temp 98.6 °F (37 °C)   Resp 28   Ht 5' 8\" (1.727 m)   Wt 90.7 kg (200 lb)   SpO2 95%   BMI 30.41 kg/m²       O2 Device: Nasal cannula   O2 Flow Rate (L/min): 3 l/min   Temp (24hrs), Av.8 °F (36.6 °C), Min:97.3 °F (36.3 °C), Max:98.6 °F (37 °C)       Intake/Output:   Last shift:       07 -  1900  In: 753.3 [I.V.:753.3]  Out: 400 [Urine:400]  Last 3 shifts:  190 -  0700  In: 50 [I.V.:50]  Out: 550 [Urine:550]    Intake/Output Summary (Last 24 hours) at 2020 1305  Last data filed at 2020 0800  Gross per 24 hour   Intake 803.33 ml   Output 950 ml   Net -146.67 ml         Physical Exam:   Comfortable; on 3 lits nc; acyanotic  HEENT: pupils not dilated, no scleral jaundice, moist oral mucosa, no nasal drainage  Neck: No adenopathy or thyroid swelling  CVS: S1S2 no murmurs; JVD not elevated  RS: Mod air entry bilaterally, mild bilateral wheezes; cough +; decreased BS at RT base; bilateral basal crackles; not tachypneic or in distress  Abd: soft, non tender, no hepatosplenomegaly, no abd distension, no guarding or rigidity, bowel sounds heard; obese abd  Neuro: non focal, awake, alert  Extrm: no leg edema or swelling or clubbing  Skin: no rash  Lymphatic: no cervical or supraclavicular adenopathy  Psych: normal mood      Telemetry: SR with PVCs    Data review:     Recent Results (from the past 24 hour(s))   EKG, 12 LEAD, INITIAL    Collection Time: 20  6:15 PM   Result Value Ref Range    Ventricular Rate 121 BPM    Atrial Rate 121 BPM    P-R Interval 158 ms    QRS Duration 74 ms    Q-T Interval 302 ms    QTC Calculation (Bezet) 428 ms    Calculated P Axis 79 degrees    Calculated R Axis 38 degrees    Calculated T Axis 39 degrees    Diagnosis       Sinus tachycardia with occasional premature ventricular complexes and fusion   complexes  Low voltage QRS  Borderline ECG  When compared with ECG of 20-FEB-2020 12:30,  fusion complexes are now present  premature ventricular complexes are now present     CBC WITH AUTOMATED DIFF    Collection Time: 02/21/20  6:20 PM   Result Value Ref Range    WBC 18.0 (H) 4.6 - 13.2 K/uL    RBC 3.92 (L) 4.70 - 5.50 M/uL    HGB 11.1 (L) 13.0 - 16.0 g/dL    HCT 33.4 (L) 36.0 - 48.0 %    MCV 85.2 74.0 - 97.0 FL    MCH 28.3 24.0 - 34.0 PG    MCHC 33.2 31.0 - 37.0 g/dL    RDW 13.6 11.6 - 14.5 %    PLATELET 417 637 - 148 K/uL    MPV 8.9 (L) 9.2 - 11.8 FL    NEUTROPHILS 90 (H) 42 - 75 %    BAND NEUTROPHILS 1 0 - 5 %    LYMPHOCYTES 3 (L) 20 - 51 %    MONOCYTES 6 2 - 9 %    EOSINOPHILS 0 0 - 5 %    BASOPHILS 0 0 - 3 %    ABS. NEUTROPHILS 16.2 (H) 1.8 - 8.0 K/UL    ABS. LYMPHOCYTES 0.5 (L) 0.8 - 3.5 K/UL    ABS. MONOCYTES 1.1 (H) 0 - 1.0 K/UL    ABS. EOSINOPHILS 0.0 0.0 - 0.4 K/UL    ABS. BASOPHILS 0.0 0.0 - 0.1 K/UL    RBC COMMENTS NORMOCYTIC, NORMOCHROMIC      DF MANUAL     METABOLIC PANEL, COMPREHENSIVE    Collection Time: 02/21/20  6:20 PM   Result Value Ref Range    Sodium 129 (L) 136 - 145 mmol/L    Potassium 4.8 3.5 - 5.5 mmol/L    Chloride 95 (L) 100 - 111 mmol/L    CO2 25 21 - 32 mmol/L    Anion gap 9 3.0 - 18 mmol/L    Glucose 138 (H) 74 - 99 mg/dL    BUN 24 (H) 7.0 - 18 MG/DL    Creatinine 1.21 0.6 - 1.3 MG/DL    BUN/Creatinine ratio 20 12 - 20      GFR est AA >60 >60 ml/min/1.73m2    GFR est non-AA 58 (L) >60 ml/min/1.73m2    Calcium 8.7 8.5 - 10.1 MG/DL    Bilirubin, total 0.4 0.2 - 1.0 MG/DL    ALT (SGPT) 13 (L) 16 - 61 U/L    AST (SGOT) 11 10 - 38 U/L    Alk.  phosphatase 102 45 - 117 U/L    Protein, total 6.9 6.4 - 8.2 g/dL    Albumin 3.6 3.4 - 5.0 g/dL    Globulin 3.3 2.0 - 4.0 g/dL    A-G Ratio 1.1 0.8 - 1.7     NT-PRO BNP    Collection Time: 02/21/20  6:20 PM   Result Value Ref Range    NT pro- 0 - 1,800 PG/ML   CARDIAC PANEL,(CK, CKMB & TROPONIN)    Collection Time: 02/21/20  6:20 PM   Result Value Ref Range  39 - 308 U/L    CK - MB 7.1 (H) <3.6 ng/ml    CK-MB Index 5.5 (H) 0.0 - 4.0 %    Troponin-I, QT 0.02 0.0 - 0.045 NG/ML   CULTURE, BLOOD    Collection Time: 02/21/20  6:20 PM   Result Value Ref Range    Special Requests: NO SPECIAL REQUESTS      Culture result: NO GROWTH AFTER 12 HOURS     LACTIC ACID    Collection Time: 02/21/20  6:20 PM   Result Value Ref Range    Lactic acid 1.4 0.4 - 2.0 MMOL/L   CULTURE, BLOOD    Collection Time: 02/21/20  6:20 PM   Result Value Ref Range    Special Requests: NO SPECIAL REQUESTS      Culture result: NO GROWTH AFTER 12 HOURS     POC G3    Collection Time: 02/21/20  6:29 PM   Result Value Ref Range    Device: BIPAP      FIO2 (POC) 35 %    pH (POC) 7.337 (L) 7.35 - 7.45      pCO2 (POC) 44.5 35.0 - 45.0 MMHG    pO2 (POC) 176 (H) 80 - 100 MMHG    HCO3 (POC) 23.8 22 - 26 MMOL/L    sO2 (POC) 99 (H) 92 - 97 %    Base deficit (POC) 2 mmol/L    PEEP/CPAP (POC) 6.0 cmH2O    PIP (POC) 12      Allens test (POC) YES      Total resp.  rate 26      Site LEFT RADIAL      Specimen type (POC) ARTERIAL      Performed by Haydee Krabbe     Spontaneous timed YES     GLUCOSE, POC    Collection Time: 02/21/20 11:08 PM   Result Value Ref Range    Glucose (POC) 170 (H) 70 - 110 mg/dL   URINALYSIS W/ RFLX MICROSCOPIC    Collection Time: 02/22/20 12:00 AM   Result Value Ref Range    Color YELLOW      Appearance CLEAR      Specific gravity 1.014 1.005 - 1.030      pH (UA) 5.0 5.0 - 8.0      Protein NEGATIVE  NEG mg/dL    Glucose NEGATIVE  NEG mg/dL    Ketone NEGATIVE  NEG mg/dL    Bilirubin NEGATIVE  NEG      Blood NEGATIVE  NEG      Urobilinogen 0.2 0.2 - 1.0 EU/dL    Nitrites NEGATIVE  NEG      Leukocyte Esterase NEGATIVE  NEG     INFLUENZA A & B AG (RAPID TEST)    Collection Time: 02/22/20 12:00 AM   Result Value Ref Range    Influenza A Antigen NEGATIVE  NEG      Influenza B Antigen NEGATIVE  NEG     CBC W/O DIFF    Collection Time: 02/22/20  4:40 AM   Result Value Ref Range    WBC 12.5 4.6 - 13.2 K/uL    RBC 3.55 (L) 4.70 - 5.50 M/uL    HGB 9.9 (L) 13.0 - 16.0 g/dL    HCT 30.3 (L) 36.0 - 48.0 %    MCV 85.4 74.0 - 97.0 FL    MCH 27.9 24.0 - 34.0 PG    MCHC 32.7 31.0 - 37.0 g/dL    RDW 13.5 11.6 - 14.5 %    PLATELET 935 618 - 754 K/uL    MPV 8.6 (L) 9.2 - 91.9 FL   METABOLIC PANEL, COMPREHENSIVE    Collection Time: 02/22/20  4:40 AM   Result Value Ref Range    Sodium 133 (L) 136 - 145 mmol/L    Potassium 4.2 3.5 - 5.5 mmol/L    Chloride 99 (L) 100 - 111 mmol/L    CO2 24 21 - 32 mmol/L    Anion gap 10 3.0 - 18 mmol/L    Glucose 138 (H) 74 - 99 mg/dL    BUN 24 (H) 7.0 - 18 MG/DL    Creatinine 1.12 0.6 - 1.3 MG/DL    BUN/Creatinine ratio 21 (H) 12 - 20      GFR est AA >60 >60 ml/min/1.73m2    GFR est non-AA >60 >60 ml/min/1.73m2    Calcium 8.6 8.5 - 10.1 MG/DL    Bilirubin, total 0.2 0.2 - 1.0 MG/DL    ALT (SGPT) 13 (L) 16 - 61 U/L    AST (SGOT) 12 10 - 38 U/L    Alk. phosphatase 81 45 - 117 U/L    Protein, total 6.5 6.4 - 8.2 g/dL    Albumin 2.9 (L) 3.4 - 5.0 g/dL    Globulin 3.6 2.0 - 4.0 g/dL    A-G Ratio 0.8 0.8 - 1.7     MAGNESIUM    Collection Time: 02/22/20  4:40 AM   Result Value Ref Range    Magnesium 2.5 1.6 - 2.6 mg/dL   GLUCOSE, POC    Collection Time: 02/22/20  5:38 AM   Result Value Ref Range    Glucose (POC) 143 (H) 70 - 110 mg/dL   GLUCOSE, POC    Collection Time: 02/22/20 12:58 PM   Result Value Ref Range    Glucose (POC) 134 (H) 70 - 110 mg/dL           Recent Labs     02/21/20  1829   FIO2I 35   HCO3I 23.8   PCO2I 44.5   PHI 7.337*   PO2I 176*       All Micro Results     Procedure Component Value Units Date/Time    INFLUENZA A & B AG (RAPID TEST) [[id number]] Collected:  02/22/20 0000    Order Status:  Completed Specimen:  Nasopharyngeal from Nasal washing Updated:  02/22/20 0831     Influenza A Antigen NEGATIVE         Comment: A negative result does not exclude influenza virus infection, seasonal or H1N1 due to suboptimal sensitivity.  If influenza is circulating in your community, a diagnosis of influenza should be considered based on a patients clinical presentation and empiric antiviral treatment should be considered, if indicated. Influenza B Antigen NEGATIVE        CULTURE, BLOOD [[id number]] Collected:  02/21/20 1820    Order Status:  Completed Specimen:  Blood Updated:  02/22/20 0817     Special Requests: NO SPECIAL REQUESTS        Culture result: NO GROWTH AFTER 12 HOURS       CULTURE, BLOOD [[id number]] Collected:  02/21/20 1820    Order Status:  Completed Specimen:  Blood Updated:  02/22/20 0817     Special Requests: NO SPECIAL REQUESTS        Culture result: NO GROWTH AFTER 12 HOURS             ECHO OCT 2019  Interpretation Summary     Result status: Final result   · Left Ventricle: Normal cavity size and systolic function (ejection fraction normal). Mild concentric hypertrophy. Estimated left ventricular ejection fraction is 51 - 55%. Mild (grade 1) left ventricular diastolic dysfunction. · Aortic Valve: Probably trileaflet aortic valve. · IVC/Hepatic Veins: Moderately elevated central venous pressure (10-15 mmHg); IVC diameter is larger than 21 mm and collapses more than 50% with respiration. Imaging:  [x]I have personally reviewed the patients chest radiographs images and report     Results from Hospital Encounter encounter on 02/21/20   XR CHEST PORT    Narrative EXAM:  AP Portable Chest X-ray 1 view     INDICATION: COPD exacerbation    COMPARISON: February 20, 2020, CT dated October 31, 2019.    _______________    FINDINGS:  Heart and mediastinal contours are within normal limits for portable  radiograph. Lungs are clear of active disease. There are no pleural effusions. Again seen is a teardrop shaped rim calcified density projecting over the right  midlung likely from prior pleural infection/inflammation. This remains stable. There is pleural plaque seen at the right lung base. No acute osseous findings.     ________________         Impression IMPRESSION: No acute process. Pleural plaque seen on the right. CT 10/31/2019  Results from Hospital Encounter encounter on 10/30/19   CTA CHEST W OR W WO CONT    Narrative EXAM: CTA Chest    INDICATION: Acute on chronic respiratory failure with hypoxia and hypercapnia. Evaluation for potential embolism. COMPARISON: Correlation made with prior CT scan of the chest obtained July 7, 2019. Correlation made with multiple prior radiographs, most recent 10/30/2019    TECHNIQUE: Axial CT imaging from the thoracic inlet through the diaphragm with  intravenous contrast utilizing CTA study for pulmonary artery evaluation. Coronal and sagittal MIP reformations were generated at a separate workstation. One or more dose reduction techniques were used on this CT: automated exposure  control, adjustment of the mAs and/or kVp according to patient size, and  iterative reconstruction techniques. The specific techniques used on this CT  exam have been documented in the patient's electronic medical record. Digital  Imaging and Communications in Medicine (DICOM) format image data are available  to nonaffiliated external healthcare facilities or entities on a secure, media  free, reciprocally searchable basis with patient authorization for at least a  12-month period after this study. _______________    FINDINGS:    EXAM QUALITY: Overall exam quality is adequate. Pulmonary arterial enhancement  is adequate. The breath hold is satisfactory. PULMONARY ARTERIES: No convincing evidence of pulmonary embolism. MEDIASTINUM: Included thyroid gland contains several small hypodensities which  are too small to require further characterization. Great vessels are of normal  caliber. Thoracic aorta normal in course and caliber. Normal cardiac size. No  pericardial effusion. Small quantity of fluid within the superior pericardial  recess. LYMPH NODES: No enlarged mediastinal or hilar nodes by size criteria.     AIRWAY: Central airways are patent. Small quantity of endobronchial secretions  noted at the distal right mainstem bronchus. No discrete foci of endobronchial  mucoid impaction. LUNGS: There are basilar areas of atelectasis which involve the lower lobes  bilaterally. Significantly improved aeration of the left lower lobe noted in the  interval from prior CT scan. No significant groundglass abnormality or septal  line thickening. No alveolar consolidation. PLEURA: As previously, elliptical morphology peripherally calcified structures  are noted within the anterior aspect of the right pleural space with additional  posterior right hemithoracic peripherally calcified pleural space collection  redemonstrated. This appears unchanged from examination of March 19, 2017 and  demonstrates radiographic stability from 2014. No evidence of pneumothorax or  pleural effusion. UPPER ABDOMEN: Small hiatal hernia. Multiple punctate granulomata throughout the  liver. No acute abnormality present. .    OTHER: No acute or aggressive osseous abnormalities identified. _______________      Impression IMPRESSION:    1. No evidence of pulmonary embolism. 2.  Basilar areas of atelectasis without findings to suggest pneumonia,  pulmonary edema, or pleural effusion. 3. 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 4. Small hiatal hernia.          IMPRESSION:   · Acute on chronic resp failure - hypoxemic- J96.21  · COPD exacerbation- J44.1  · Asbestosis- J61  · RT sided pleural effusion (loculated)- J90  · CKD stage 2- N18.2  · Parox Afib - I48.0  ·         RECOMMENDATIONS:   · Pulm: currently nc o2; wean fio2 for o2 sats >91%; BIPAP qhs  · Duonebs, added Budesonide and pulmozyme nebs; avoid LABA nebs due to parox Afib hx  · IV steroids - solumedrol 60 mg q6  · Cardiac: stable hemodynamics; watch BP  · ID: ceftriaxone and azithromycin empiric x 5-7days; send sputum cxs  · Renal: watch IOs and renal fn  · GI: oral diet as tolerated  · Neuro: stable mentation  · Hem: watch Hb and Plts  · Endo: poc BG and SSI   · Fluids: NS decreased 50/hr  · Proph:  DVt and GI proph- sc heparin and PPI  · D.w patient and updated  · Ok to tele geller  · D/w RN   Will defer respective systems problem management to primary and other consultant and follow patient in ICU with primary and other medical team  Further recommendations will be based on the patient's response to recommended treatment and results of the investigation ordered. Quality Care: PPI, DVT prophylaxis, HOB elevated, Infection control all reviewed and addressed.   PAIN AND SEDATION: none   · Skin/Wound: no active issues  · Nutrition: oral liquid diet as tolerated  · Prophylaxis: DVT and GI Prophylaxis reviewed  · Restraints: none  · PT/OT eval and treat: as needed  · Lines/Tubes: PIVs  ADVANCE DIRECTIVE: Full Code    · Thank you for the consult      High complexity decision making was performed in this consultation and evaluation of this patient        Sabine Pool MD

## 2020-02-22 NOTE — ROUTINE PROCESS
TRANSFER - OUT REPORT:    Verbal report given to Jj Sutton RN(name) on Stephen Wagner  being transferred to [facility](unit) for routine progression of care       Report consisted of patients Situation, Background, Assessment and   Recommendations(SBAR). Information from the following report(s) SBAR, ED Summary, Procedure Summary, Intake/Output, MAR, Recent Results, Med Rec Status and Cardiac Rhythm nsr was reviewed with the receiving nurse. Lines:   Peripheral IV 02/21/20 Anterior; Left Forearm (Active)   Site Assessment Clean, dry, & intact 2/21/2020  6:31 PM   Phlebitis Assessment 0 2/21/2020  6:31 PM   Infiltration Assessment 0 2/21/2020  6:31 PM   Dressing Status Clean, dry, & intact 2/21/2020  6:31 PM   Dressing Type Transparent 2/21/2020  6:31 PM   Hub Color/Line Status Pink 2/21/2020  6:31 PM   Action Taken Blood drawn 2/21/2020  6:31 PM   Alcohol Cap Used Yes 2/21/2020  6:31 PM       Peripheral IV 02/21/20 Right Antecubital (Active)   Site Assessment Clean, dry, & intact 2/21/2020  6:20 PM   Phlebitis Assessment 0 2/21/2020  6:20 PM   Infiltration Assessment 0 2/21/2020  6:20 PM   Dressing Status Clean, dry, & intact 2/21/2020  6:20 PM   Dressing Type Tape;Transparent 2/21/2020  6:20 PM   Hub Color/Line Status Green;Flushed;Patent 2/21/2020  6:20 PM   Action Taken Blood drawn 2/21/2020  6:20 PM        Opportunity for questions and clarification was provided.       Patient transported with:   Registered Nurse

## 2020-02-22 NOTE — PROGRESS NOTES
Reason for Admission:   Chart reviewed as CM on call; patient is a 66 yr old male w/ hx of CKD, COPD on home O2 2l/min, HTN, asbestosis and PAF presented to ED with worsening SOB over last 2-3 weeks               RUR Score:     High; 38%             Resources/supports as identified by patient/family:   Lives w/ son and wife; daughter and granddaughter live in [facility] facing patient (as identified by patient/family and CM): Finances/Medication cost?      Medicare              Transportation?  family              Support system or lack thereof? ; family lives nearby                     Living arrangements? Own home, lives w/ wife and son            Self-care/ADLs/Cognition? Alert and oriented, previously independent in ADLs          Current Advanced Directive/Advance Care Plan:  Full code                          Plan for utilizing home health:    Has used it in past, \"didn't like it\"; good candidate for [facility] and information provided to daughter. Transition of Care Plan:   Patient is still acutely SOB in ICU and care manager will follow as needed for discharge needs; patient stated he has home O2, walker and cane for home use.       Care Management Interventions  PCP Verified by CM: (had appt with Dr. Meredith Grande 2/21/20 but was too ill and called paramedics to bring to [facility])  Palliative Care Criteria Met (RRAT>21 & CHF Dx)?: Yes  Palliative Consult Recommended?: Yes(referral will be placed)  Mode of Transport at Discharge: (TBD)  Transition of Care Consult (CM Consult): Discharge Planning  The Plan for Transition of Care is Related to the Following Treatment Goals : home when medically stable  The Patient and/or Patient Representative was Provided with a Choice of Provider and Agrees with the Discharge Plan?: Yes  Discharge Location  Discharge Placement: Home

## 2020-02-22 NOTE — PROGRESS NOTES
PATIENT ARRIVED IN ER VIA EMS ON CPAP. CHANGED TO BIPAP @ 12/6 WITH FIO2 35%. ABG'S WERE OBTAINED AND FIO2 WAS DECREASED TO 28%. (PATIENT IS O2 DEPENDENT @ 2L). NEBS X 3 GIVEN. CONTINUING TO MONITOR.

## 2020-02-22 NOTE — ED PROVIDER NOTES
EMERGENCY DEPARTMENT HISTORY AND PHYSICAL EXAM    Date: 2/21/2020  Patient Name: Rosa Chawla    History of Presenting Illness     Chief Complaint   Patient presents with    COPD         History Provided By: Patient and EMS    Mary is a 66 y.o. male with PMHX of tension, COPD, who presents to the emergency department C/O respiratory distress. Patient presents with COPD exacerbation on CPAP. Unable to provide history. Patient seen emergency department yesterday. Treated as COPD exacerbation and was discharged home. Patient reports that his COPD exacerbation got worse this afternoon. Denies smoking cigarettes today but is a chronic tobacco user.     PCP: Nancy Arellano MD    Current Facility-Administered Medications   Medication Dose Route Frequency Provider Last Rate Last Dose    albuterol-ipratropium (DUO-NEB) 2.5 MG-0.5 MG/3 ML  3 mL Nebulization NOW Laina Sanderson MD        sodium chloride (NS) flush 5-10 mL  5-10 mL IntraVENous PRN Laina Sanderson MD        piperacillin-tazobactam (ZOSYN) 4.5 g in 0.9% sodium chloride (MBP/ADV) 100 mL MBP  4.5 g IntraVENous Q6H Laina Sanderson  mL/hr at 02/21/20 1954 4.5 g at 02/21/20 1954    levoFLOXacin (LEVAQUIN) 750 mg in D5W IVPB  750 mg IntraVENous Q24H Laina Sanderson  mL/hr at 02/21/20 1953 750 mg at 02/21/20 1953    sodium chloride (NS) flush 5-40 mL  5-40 mL IntraVENous Q8H Lulu Glaser MD        sodium chloride (NS) flush 5-40 mL  5-40 mL IntraVENous PRN Lulu Glaser MD        ondansetron [facility]) injection 4 mg  4 mg IntraVENous Q6H PRN Lulu Glaser MD        [START ON 2/22/2020] methylPREDNISolone (PF) (Solu-MEDROL) injection 125 mg  125 mg IntraVENous Q8H Lulu Glaser MD        albuterol-ipratropium (DUO-NEB) 2.5 MG-0.5 MG/3 ML  3 mL Nebulization Q4H RT Lulu Glaser MD        [START ON 2/22/2020] pantoprazole (PROTONIX) 40 mg in 0.9% sodium chloride 10 mL injection  40 mg IntraVENous DAILY Cesar Luis MD         Current Outpatient Medications   Medication Sig Dispense Refill    chlorthalidone (HYGROTEN) 25 mg tablet Take 25 mg by mouth daily.  predniSONE (DELTASONE) 50 mg tablet Take 1 Tab by mouth daily for 4 days. 4 Tab 0    acetaminophen (TYLENOL) 325 mg tablet Take 500 mg by mouth every four (4) hours as needed for Pain.  OXYGEN-AIR DELIVERY SYSTEMS Take 2 L by inhalation continuous.  dextran 70/hypromellose (ARTIFICIAL TEARS, PF, OP) Apply 2 Drops to eye as needed for Other (both eyes for dryness).  diphenhydrAMINE (BENADRYL) 25 mg capsule Take 25 mg by mouth nightly as needed for Itching.  albuterol-ipratropium (DUO-NEB) 2.5 mg-0.5 mg/3 ml nebu 3 mL by Nebulization route every four (4) hours as needed. (Patient taking differently: 3 mL by Nebulization route every four (4) hours as needed for Other (Shortness of breath). ) 30 Nebule 0    albuterol (PROVENTIL HFA, VENTOLIN HFA, PROAIR HFA) 90 mcg/actuation inhaler Take 2 Puffs by inhalation every four (4) hours as needed for Wheezing or Shortness of Breath. 1 Inhaler 1    ipratropium (ATROVENT) 0.03 % nasal spray 2 Sprays by Both Nostrils route every twelve (12) hours as needed for Rhinitis.  tamsulosin (FLOMAX) 0.4 mg capsule Take 0.4 mg by mouth every evening.          Past History     Past Medical History:  Past Medical History:   Diagnosis Date    Brain aneurysm     Cancer ([facility] [address]     prostate    COPD (chronic obstructive pulmonary disease) ([facility] [address]     Hypertension     Nocturia     Pneumonia     Radiation effect        Past Surgical History:  Past Surgical History:   Procedure Laterality Date    HX HERNIA REPAIR         Family History:  Family History   Problem Relation Age of Onset    Heart Disease Mother        Social History:  Social History     Tobacco Use    Smoking status: Former Smoker     Types: Cigarettes    Smokeless tobacco: Never Used Substance Use Topics    Alcohol use: No    Drug use: Never       Allergies: Allergies   Allergen Reactions    Aspirin Other (comments)     \"messes my stomach up\"         Review of Systems   Review of Systems   Unable to perform ROS: Severe respiratory distress         Physical Exam     Vitals:    02/21/20 1850 02/21/20 1910 02/21/20 1939 02/21/20 2000   BP:    143/72   Pulse:    (!) 102   Resp:    17   Temp:   97.9 °F (36.6 °C)    SpO2: 100% 100%  100%   Weight:       Height:         Physical Exam  Constitutional:       General: He is in acute distress. Comments: On BiPap     HENT:      Head: Normocephalic and atraumatic. Cardiovascular:      Rate and Rhythm: Tachycardia present. Pulses: Normal pulses. Heart sounds: Normal heart sounds. Pulmonary:      Effort: Tachypnea, prolonged expiration and respiratory distress present. Breath sounds: Decreased air movement present. Abdominal:      General: There is no distension. Tenderness: There is no abdominal tenderness. Musculoskeletal:         General: No deformity. Skin:     General: Skin is warm and dry. Capillary Refill: Capillary refill takes less than 2 seconds. Coloration: Skin is pale. Neurological:      General: No focal deficit present. Mental Status: He is alert. Mental status is at baseline.                Diagnostic Study Results     Labs -     Recent Results (from the past 12 hour(s))   EKG, 12 LEAD, INITIAL    Collection Time: 02/21/20  6:15 PM   Result Value Ref Range    Ventricular Rate 121 BPM    Atrial Rate 121 BPM    P-R Interval 158 ms    QRS Duration 74 ms    Q-T Interval 302 ms    QTC Calculation (Bezet) 428 ms    Calculated P Axis 79 degrees    Calculated R Axis 38 degrees    Calculated T Axis 39 degrees    Diagnosis       Sinus tachycardia with occasional premature ventricular complexes and fusion   complexes  Low voltage QRS  Borderline ECG  When compared with ECG of 20-FEB-2020 12:30,  fusion complexes are now present  premature ventricular complexes are now present     CBC WITH AUTOMATED DIFF    Collection Time: 02/21/20  6:20 PM   Result Value Ref Range    WBC 18.0 (H) 4.6 - 13.2 K/uL    RBC 3.92 (L) 4.70 - 5.50 M/uL    HGB 11.1 (L) 13.0 - 16.0 g/dL    HCT 33.4 (L) 36.0 - 48.0 %    MCV 85.2 74.0 - 97.0 FL    MCH 28.3 24.0 - 34.0 PG    MCHC 33.2 31.0 - 37.0 g/dL    RDW 13.6 11.6 - 14.5 %    PLATELET 318 657 - 994 K/uL    MPV 8.9 (L) 9.2 - 11.8 FL    NEUTROPHILS 90 (H) 42 - 75 %    BAND NEUTROPHILS 1 0 - 5 %    LYMPHOCYTES 3 (L) 20 - 51 %    MONOCYTES 6 2 - 9 %    EOSINOPHILS 0 0 - 5 %    BASOPHILS 0 0 - 3 %    ABS. NEUTROPHILS 16.2 (H) 1.8 - 8.0 K/UL    ABS. LYMPHOCYTES 0.5 (L) 0.8 - 3.5 K/UL    ABS. MONOCYTES 1.1 (H) 0 - 1.0 K/UL    ABS. EOSINOPHILS 0.0 0.0 - 0.4 K/UL    ABS. BASOPHILS 0.0 0.0 - 0.1 K/UL    RBC COMMENTS NORMOCYTIC, NORMOCHROMIC      DF MANUAL     METABOLIC PANEL, COMPREHENSIVE    Collection Time: 02/21/20  6:20 PM   Result Value Ref Range    Sodium 129 (L) 136 - 145 mmol/L    Potassium 4.8 3.5 - 5.5 mmol/L    Chloride 95 (L) 100 - 111 mmol/L    CO2 25 21 - 32 mmol/L    Anion gap 9 3.0 - 18 mmol/L    Glucose 138 (H) 74 - 99 mg/dL    BUN 24 (H) 7.0 - 18 MG/DL    Creatinine 1.21 0.6 - 1.3 MG/DL    BUN/Creatinine ratio 20 12 - 20      GFR est AA >60 >60 ml/min/1.73m2    GFR est non-AA 58 (L) >60 ml/min/1.73m2    Calcium 8.7 8.5 - 10.1 MG/DL    Bilirubin, total 0.4 0.2 - 1.0 MG/DL    ALT (SGPT) 13 (L) 16 - 61 U/L    AST (SGOT) 11 10 - 38 U/L    Alk.  phosphatase 102 45 - 117 U/L    Protein, total 6.9 6.4 - 8.2 g/dL    Albumin 3.6 3.4 - 5.0 g/dL    Globulin 3.3 2.0 - 4.0 g/dL    A-G Ratio 1.1 0.8 - 1.7     NT-PRO BNP    Collection Time: 02/21/20  6:20 PM   Result Value Ref Range    NT pro- 0 - 1,800 PG/ML   CARDIAC PANEL,(CK, CKMB & TROPONIN)    Collection Time: 02/21/20  6:20 PM   Result Value Ref Range     39 - 308 U/L    CK - MB 7.1 (H) <3.6 ng/ml    CK-MB Index 5.5 (H) 0.0 - 4.0 %    Troponin-I, QT 0.02 0.0 - 0.045 NG/ML   LACTIC ACID    Collection Time: 02/21/20  6:20 PM   Result Value Ref Range    Lactic acid 1.4 0.4 - 2.0 MMOL/L   POC G3    Collection Time: 02/21/20  6:29 PM   Result Value Ref Range    Device: BIPAP      FIO2 (POC) 35 %    pH (POC) 7.337 (L) 7.35 - 7.45      pCO2 (POC) 44.5 35.0 - 45.0 MMHG    pO2 (POC) 176 (H) 80 - 100 MMHG    HCO3 (POC) 23.8 22 - 26 MMOL/L    sO2 (POC) 99 (H) 92 - 97 %    Base deficit (POC) 2 mmol/L    PEEP/CPAP (POC) 6.0 cmH2O    PIP (POC) 12      Allens test (POC) YES      Total resp. rate 26      Site LEFT RADIAL      Specimen type (POC) ARTERIAL      Performed by Justin Lamb     Spontaneous timed YES         Radiologic Studies -   XR CHEST PORT   Final Result   IMPRESSION:  No acute process. Pleural plaque seen on the right. CT Results  (Last 48 hours)    None        CXR Results  (Last 48 hours)               02/21/20 1854  XR CHEST PORT Final result    Impression:  IMPRESSION:  No acute process. Pleural plaque seen on the right. Narrative:  EXAM:  AP Portable Chest X-ray 1 view        INDICATION: COPD exacerbation       COMPARISON: February 20, 2020, CT dated October 31, 2019.       _______________       FINDINGS:  Heart and mediastinal contours are within normal limits for portable   radiograph. Lungs are clear of active disease. There are no pleural effusions. Again seen is a teardrop shaped rim calcified density projecting over the right   midlung likely from prior pleural infection/inflammation. This remains stable. There is pleural plaque seen at the right lung base. No acute osseous findings. ________________               02/20/20 1238  XR CHEST PORT Final result    Impression:  Impression:       No acute cardiopulmonary disease or significant change. Right pleural   calcifications and parenchymal scarring, likely sequela of complex effusion.        Narrative:  CHEST AP PORTABLE Indication: Shortness of breath. Comparison: X-ray 01/18/2020 and correlation with chest CT 10/31/2019. Findings: Well demarcated density in the right midlung corresponds to anterior   calcified pleural lesion. Persistent streaky hazy opacity in the right lower   lung base corresponding to pleural calcification and adjacent alveolar opacity   without appreciable change. Left lungs appear clear. The cardiac silhouette and   pulmonary vascularity appear within normal limits. No evidence for pneumothorax   or left pleural effusion.                  Medications given in the ED-  Medications   albuterol-ipratropium (DUO-NEB) 2.5 MG-0.5 MG/3 ML (has no administration in time range)   sodium chloride (NS) flush 5-10 mL (has no administration in time range)   piperacillin-tazobactam (ZOSYN) 4.5 g in 0.9% sodium chloride (MBP/ADV) 100 mL MBP (4.5 g IntraVENous New Bag 2/21/20 1954)   levoFLOXacin (LEVAQUIN) 750 mg in D5W IVPB (750 mg IntraVENous New Bag 2/21/20 1953)   sodium chloride (NS) flush 5-40 mL (has no administration in time range)   sodium chloride (NS) flush 5-40 mL (has no administration in time range)   ondansetron (ZOFRAN) injection 4 mg (has no administration in time range)   methylPREDNISolone (PF) (Solu-MEDROL) injection 125 mg (has no administration in time range)   albuterol-ipratropium (DUO-NEB) 2.5 MG-0.5 MG/3 ML (has no administration in time range)   pantoprazole (PROTONIX) 40 mg in 0.9% sodium chloride 10 mL injection (has no administration in time range)   albuterol-ipratropium (DUO-NEB) 2.5 MG-0.5 MG/3 ML (3 mL Nebulization Given 2/21/20 1850)   albuterol-ipratropium (DUO-NEB) 2.5 MG-0.5 MG/3 ML (3 mL Nebulization Given 2/21/20 1837)   methylPREDNISolone (PF) (Solu-MEDROL) injection 125 mg (125 mg IntraVENous Given 2/21/20 1826)   magnesium sulfate 2 g/50 ml IVPB (premix or compounded) (0 g IntraVENous IV Completed 2/21/20 1926)   sodium chloride 0.9 % bolus infusion 1,000 mL (1,000 mL IntraVENous New Bag 2/21/20 1829)         Medical Decision Making   I am the first provider for this patient. I reviewed the vital signs, available nursing notes, past medical history, past surgical history, family history and social history. Vital Signs-Reviewed the patient's vital signs. Pulse Oximetry Analysis - 100% on BiPAP     Cardiac Monitor:  Rate: 107 bpm  Rhythm: Sinus Tachcyardia    EKG interpretation: (Preliminary)  EKG read by Dr. Meena Blanco  Sinus tachycardia rate of 121, two PVCs, no JOLYNN    Records Reviewed: Nursing Notes and Old Medical Records    Provider Notes (Medical Decision Making): Vignesh Alfaro is a 66 y.o. male presents in respiratory distress    I evaluated patient yesterday when he was stable on his baseline 2 L nasal cannula and comfortable going home. He was started on a steroid burst and reported that he had plenty of nebulizer treatments at home. He returns about 24 hours later now in severe respiratory distress. Currently satting adequately and tolerating BiPAP but remains tachypneic and I suspect will be BiPAP dependent. ABG not particularly concerning does not show a significant hypercapnia or respiratory acidosis. This chest x-ray this evening looks grossly unchanged however does have maybe an increase in the hazy right lower lobe opacity that is been seen on his prior films. Will empirically cover for pneumonia and will add Zosyn for any potential aspiration. Sepsis pathway started due to abnormal vital signs and possible pneumonia. Procedures:  Procedures    ED Course:     7:24 PM  Patient's presentation, labs/imaging and plan of care was reviewed with Dr Diane Tsang as part of sign out. They will follow up on blood work and treatment as part of the plan discussed with the patient. Likely ICU admission if remains bipap dependent. Upon reexamining the patient he is currently on BiPAP.   He is somnolent but easily arousable to voice and oriented x3.  He states that the BiPAP is helping him with his work of breathing and feels significantly better than when he first came in. His laboratory findings showed evidence of leukocytosis, question if this is due to the recent steroids he was given although chest x-ray does show slightly worsening of the right lower lobe, sepsis protocol was initiated and antibiotics were given for community-acquired pneumonia coverage. Considering his continuous BiPAP use will admit to ICU. Patient is agreeable to plan    Diagnosis and Disposition     7:25 PM  I have spent 45 minutes of critical care time involved in lab review, consultations with specialist, family decision-making, and documentation. During this entire length of time I was immediately available to the patient. Critical Care: The reason for providing this level of medical care for this critically ill patient was due a critical illness that impaired one or more vital organ systems such that there was a high probability of imminent or life threatening deterioration in the patients condition. This care involved high complexity decision making to assess, manipulate, and support vital system functions, to treat this degreee vital organ system failure and to prevent further life threatening deterioration of the patients condition. Core Measures:  For Hospitalized Patients:    1. Hospitalization Decision Time:  The decision to hospitalize the patient was made by Cesar Edouard DO. at 9:13 PM on 2/21/2020    2. Aspirin: Aspirin was not given because the patient did not present with a stroke at the time of their Emergency Department evaluation    7:25 PM  Patient is being admitted to the hospital by Dr. Mike Wang. The results of their tests and reasons for their admission have been discussed with them and/or available family. They convey agreement and understanding for the need to be admitted and for their admission diagnosis.       CONDITIONS ON ADMISSION:  Sepsis is present at the time of admission. Deep Vein Thrombosis is not present at the time of admission. Thrombosis is not present at the time of admission. Urinary Tract Infection is not present at the time of admission. Pneumonia likely present at the time of admission. MRSA possibly present at the time of admission. Wound infection is not present at the time of admission. Pressure Ulcer is not present at the time of admission. CLINICAL IMPRESSION:    1. Acute respiratory failure, unspecified whether with hypoxia or hypercapnia ([facility] [address]    2. Acute exacerbation of chronic obstructive pulmonary disease (COPD) ([facility] [address]    3. Tobacco dependence    4. Leukocytosis, unspecified type    5. Hyponatremia    6. Lower respiratory infection    7. Sepsis, due to unspecified organism, unspecified whether acute organ dysfunction present [facility])      _______________________________      Please note that this dictation was completed with FileString, the computer voice recognition software. Quite often unanticipated grammatical, syntax, homophones, and other interpretive errors are inadvertently transcribed by the computer software. Please disregard these errors. Please excuse any errors that have escaped final proofreading.

## 2020-02-22 NOTE — PROGRESS NOTES
Speech Pathology Note      Swallow eval completed b/s with recs of dental soft solids/ thin liquids, aspiration precautions (HOB 60* for all po feeds and >/= 45* at least 30 minutes following, small bites/ sips, alternate solids/ liquids, decreased rate of intake, 2 swallows per sip liquid). Full report to follow.     Thank you for this referral,  Benito Britt MS, CCC/SLP  Speech- Language Pathologist

## 2020-02-22 NOTE — ROUTINE PROCESS
0114 Assumed care of patient from Theresa Ramos RN.    8878 Assessment completed. Pt has a productive cough with tan and dark brown mucous. 0719 Bedside and Verbal shift change report given to MAURICIO Mahan RN (oncoming nurse) by Ousmane Steve RN (offgoing nurse). Report included the following information SBAR, Kardex, MAR, Accordion and Recent Results.

## 2020-02-22 NOTE — H&P
History & Physical    Patient: Chuck Herndon MRN: [id number]  CSN: [id number]    YOB: 1945  Age: 66 y.o. Sex: male      DOA: 2/21/2020  Primary Care Provider:  Larry Lui MD      Assessment/Plan     Patient Active Problem List   Diagnosis Code    Hypertension I10    COPD (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 (HCC) J44.9    Chronic renal disease, stage 3, moderately decreased glomerular filtration rate between 30-59 mL/min/1.73 square meter (HCC) N18.3    Asbestosis ([facility] J61    Acute exacerbation of chronic obstructive pulmonary disease (COPD) ([facility] J44.1    Advanced care planning/counseling discussion Z71.89    Debility R53.81    Pneumonia of right lower lobe due to methicillin susceptible Staphylococcus aureus (MSSA) ([facility] J15.211    Paroxysmal atrial fibrillation (HCC) I48.0    Chronic respiratory failure with hypoxia ([facility]) J96.11    COPD (chronic obstructive pulmonary disease) ([facility] J44.9    Acute on chronic respiratory failure with hypoxia and hypercapnia (HCC) J96.21, J96.22    Atrial fibrillation with RVR ([facility]) I48.91    Acute respiratory failure ([facility]) J96.00    Tobacco dependence F17.200    Leukocytosis D72.829    Hyponatremia E87.1    Acute-on-chronic kidney injury ([facility] N17.9, N18.9     67 y/o male with hx of CKD, COPD, HTN, asbestosis, and PAF admitted for COPD exacerbation and acute respiratory failure requiring BiPAP. He also has hyponatremia likely from diuretic use. Neuro-no issues  Pulm-hx of COPD and asbestosis. On BiPAP for respiratory failure, will wean as tolerated. Duonebs, solumedrol, and azithromycin for COPD exacerbation. CV-hx of PAF, will monitor rhythm. Discuss AC at time of discharge for afib (allergy to ASA, can't afford NOAC, consider coumadin)  GI-NPO except meds while on BiPAP  Renal-URIEL and hyponatremia. Will treat w/ slow rate NS. Will stop hygroton.  Can consider ACEI on discharge if Cr improves. Heme-leukocytosis w/ left shift. No sepsis and normal lactate. Chronic mild anemia. ID-will treat with azithromycin since lactate normal and no findings of PNA on CXR. Flu swab ordered. Endo-no issues  F/E/N-MIVF/replete prn/NPO    Prophylaxis: SCDs, protonix IV, heparin SQ    Full code    Estimated length of stay : 2-3 nights    Liliana Medellin MD  Nocturnist    Critical Care Time  2519-4626  45 minutes of critical care time spent in the direct evaluation and treatment of this high risk patient. The reason for providing this level of medical care for this critically ill patient was due a critical illness that impaired one or more vital organ systems such that there was a high probability of imminent or life threatening deterioration in the patients condition. This care involved high complexity decision making to assess, manipulate, and support vital system functions, to treat this degreee vital organ system failure and to prevent further life threatening deterioration of the patients condition.  -------------------------------------------------------------------------------------------------------------------------------------------------------------------------------------------  CC: SOB       HPI:     Vaishali Rodriguez is a 66 y.o. male who has a hx of CKD, COPD on 2L home O2, HTN, asbestosis, and PAF who presents with worsening SOB today. Was seen in the ER yesterday and given prednisone to take at home. However, he worsened and was placed on BiPAP in the ER today. He reports SOB over the past 2-3 weeks that has gradually worsened.     Past Medical History:   Diagnosis Date    Brain aneurysm     Cancer [facility])     prostate    COPD (chronic obstructive pulmonary disease) (HCC)     Hypertension     Nocturia     Pneumonia     Radiation effect        Past Surgical History:   Procedure Laterality Date    HX HERNIA REPAIR         Family History   Problem Relation Age of Onset    Heart Disease Mother        Social History     Socioeconomic History    Marital status:      Spouse name: Not on file    Number of children: Not on file    Years of education: Not on file    Highest education level: Not on file   Tobacco Use    Smoking status: Former Smoker     Types: Cigarettes    Smokeless tobacco: Never Used   Substance and Sexual Activity    Alcohol use: No    Drug use: Never       Prior to Admission medications    Medication Sig Start Date End Date Taking? Authorizing Provider   chlorthalidone (HYGROTEN) 25 mg tablet Take 25 mg by mouth daily. Blayne Bran MD   predniSONE (DELTASONE) 50 mg tablet Take 1 Tab by mouth daily for 4 days. 2/21/20 2/25/20  Laina Sanderson MD   acetaminophen (TYLENOL) 325 mg tablet Take 500 mg by mouth every four (4) hours as needed for Pain. Provider, Historical   OXYGEN-AIR DELIVERY SYSTEMS Take 2 L by inhalation continuous. Provider, Historical   dextran 70/hypromellose (ARTIFICIAL TEARS, PF, OP) Apply 2 Drops to eye as needed for Other (both eyes for dryness). Provider, Historical   diphenhydrAMINE (BENADRYL) 25 mg capsule Take 25 mg by mouth nightly as needed for Itching. Provider, Historical   albuterol-ipratropium (DUO-NEB) 2.5 mg-0.5 mg/3 ml nebu 3 mL by Nebulization route every four (4) hours as needed. Patient taking differently: 3 mL by Nebulization route every four (4) hours as needed for Other (Shortness of breath). 2/9/19   Palma Fonseca DO   albuterol (PROVENTIL HFA, VENTOLIN HFA, PROAIR HFA) 90 mcg/actuation inhaler Take 2 Puffs by inhalation every four (4) hours as needed for Wheezing or Shortness of Breath. 4/23/18   Damien Norton PA-C   ipratropium (ATROVENT) 0.03 % nasal spray 2 Sprays by Both Nostrils route every twelve (12) hours as needed for Rhinitis. Blayne Bran MD   tamsulosin (FLOMAX) 0.4 mg capsule Take 0.4 mg by mouth every evening.     Blayne Bran MD       Allergies   Allergen Reactions    Aspirin Other (comments)     \"messes my stomach up\"       Review of Systems  Gen: No fever, chills, malaise, weight loss/gain. Heent: No headache, rhinorrhea, epistaxis, ear pain, hearing loss, sinus pain, neck pain/stiffness, sore throat. Heart: No chest pain, palpitations, KUMAR, pnd, or orthopnea. Resp: No cough, hemoptysis, wheezing and shortness of breath. GI: No nausea, vomiting, diarrhea, constipation, melena or hematochezia. : No urinary obstruction, dysuria or hematuria. Derm: No rash, new skin lesion or pruritis. Musc/skeletal: no bone or joint complaints. Vasc: No edema, cyanosis or claudication. Endo: No heat/cold intolerance, no polyuria,polydipsia or polyphagia. Neuro: No unilateral weakness, numbness, tingling. No seizures. Heme: No easy bruising or bleeding. Physical Exam:     Physical Exam:  Visit Vitals  /70   Pulse 99   Temp 97.5 °F (36.4 °C)   Resp 25   Ht 5' 8\" (1.727 m)   Wt 90.7 kg (200 lb)   SpO2 100%   BMI 30.41 kg/m²      O2 Device: BIPAP    Temp (24hrs), Av.6 °F (36.4 °C), Min:97.5 °F (36.4 °C), Max:97.9 °F (36.6 °C)     1901 -  0700  In: 50 [I.V.:50]  Out: -    No intake/output data recorded. General:  Awake, cooperative, no distress, BiPAP in place. Head:  Normocephalic, without obvious abnormality, atraumatic. Eyes:  Conjunctivae/corneas clear, sclera anicteric. Neck: Supple, symmetrical, trachea midline. Lungs:   R>L mild expiratory wheezes. Heart:  Regular rate and rhythm, S1, S2 normal, no murmur, click, rub or gallop. Abdomen: Soft, non-tender. Bowel sounds normal. No masses,  No organomegaly. Extremities: Extremities normal, atraumatic, no cyanosis or edema. Capillary refill normal.   Pulses: 2+ and symmetric all extremities. Skin: Skin color pink, turgor normal. No rashes or lesions   Neurologic: No focal motor or sensory deficit. Labs Reviewed: All lab results for the last 24 hours reviewed.   Recent Results (from the past 24 hour(s))   EKG, 12 LEAD, INITIAL    Collection Time: 02/21/20  6:15 PM   Result Value Ref Range    Ventricular Rate 121 BPM    Atrial Rate 121 BPM    P-R Interval 158 ms    QRS Duration 74 ms    Q-T Interval 302 ms    QTC Calculation (Bezet) 428 ms    Calculated P Axis 79 degrees    Calculated R Axis 38 degrees    Calculated T Axis 39 degrees    Diagnosis       Sinus tachycardia with occasional premature ventricular complexes and fusion   complexes  Low voltage QRS  Borderline ECG  When compared with ECG of 20-FEB-2020 12:30,  fusion complexes are now present  premature ventricular complexes are now present     CBC WITH AUTOMATED DIFF    Collection Time: 02/21/20  6:20 PM   Result Value Ref Range    WBC 18.0 (H) 4.6 - 13.2 K/uL    RBC 3.92 (L) 4.70 - 5.50 M/uL    HGB 11.1 (L) 13.0 - 16.0 g/dL    HCT 33.4 (L) 36.0 - 48.0 %    MCV 85.2 74.0 - 97.0 FL    MCH 28.3 24.0 - 34.0 PG    MCHC 33.2 31.0 - 37.0 g/dL    RDW 13.6 11.6 - 14.5 %    PLATELET 949 519 - 586 K/uL    MPV 8.9 (L) 9.2 - 11.8 FL    NEUTROPHILS 90 (H) 42 - 75 %    BAND NEUTROPHILS 1 0 - 5 %    LYMPHOCYTES 3 (L) 20 - 51 %    MONOCYTES 6 2 - 9 %    EOSINOPHILS 0 0 - 5 %    BASOPHILS 0 0 - 3 %    ABS. NEUTROPHILS 16.2 (H) 1.8 - 8.0 K/UL    ABS. LYMPHOCYTES 0.5 (L) 0.8 - 3.5 K/UL    ABS. MONOCYTES 1.1 (H) 0 - 1.0 K/UL    ABS. EOSINOPHILS 0.0 0.0 - 0.4 K/UL    ABS.  BASOPHILS 0.0 0.0 - 0.1 K/UL    RBC COMMENTS NORMOCYTIC, NORMOCHROMIC      DF MANUAL     METABOLIC PANEL, COMPREHENSIVE    Collection Time: 02/21/20  6:20 PM   Result Value Ref Range    Sodium 129 (L) 136 - 145 mmol/L    Potassium 4.8 3.5 - 5.5 mmol/L    Chloride 95 (L) 100 - 111 mmol/L    CO2 25 21 - 32 mmol/L    Anion gap 9 3.0 - 18 mmol/L    Glucose 138 (H) 74 - 99 mg/dL    BUN 24 (H) 7.0 - 18 MG/DL    Creatinine 1.21 0.6 - 1.3 MG/DL    BUN/Creatinine ratio 20 12 - 20      GFR est AA >60 >60 ml/min/1.73m2    GFR est non-AA 58 (L) >60 ml/min/1.73m2    Calcium 8.7 8.5 - 10.1 MG/DL Bilirubin, total 0.4 0.2 - 1.0 MG/DL    ALT (SGPT) 13 (L) 16 - 61 U/L    AST (SGOT) 11 10 - 38 U/L    Alk. phosphatase 102 45 - 117 U/L    Protein, total 6.9 6.4 - 8.2 g/dL    Albumin 3.6 3.4 - 5.0 g/dL    Globulin 3.3 2.0 - 4.0 g/dL    A-G Ratio 1.1 0.8 - 1.7     NT-PRO BNP    Collection Time: 02/21/20  6:20 PM   Result Value Ref Range    NT pro- 0 - 1,800 PG/ML   CARDIAC PANEL,(CK, CKMB & TROPONIN)    Collection Time: 02/21/20  6:20 PM   Result Value Ref Range     39 - 308 U/L    CK - MB 7.1 (H) <3.6 ng/ml    CK-MB Index 5.5 (H) 0.0 - 4.0 %    Troponin-I, QT 0.02 0.0 - 0.045 NG/ML   LACTIC ACID    Collection Time: 02/21/20  6:20 PM   Result Value Ref Range    Lactic acid 1.4 0.4 - 2.0 MMOL/L   POC G3    Collection Time: 02/21/20  6:29 PM   Result Value Ref Range    Device: BIPAP      FIO2 (POC) 35 %    pH (POC) 7.337 (L) 7.35 - 7.45      pCO2 (POC) 44.5 35.0 - 45.0 MMHG    pO2 (POC) 176 (H) 80 - 100 MMHG    HCO3 (POC) 23.8 22 - 26 MMOL/L    sO2 (POC) 99 (H) 92 - 97 %    Base deficit (POC) 2 mmol/L    PEEP/CPAP (POC) 6.0 cmH2O    PIP (POC) 12      Allens test (POC) YES      Total resp. rate 26      Site LEFT RADIAL      Specimen type (POC) ARTERIAL      Performed by Farideh Double     Spontaneous timed YES         Results   XR CHEST PORT (Accession [id number]) (Order [id number])   Allergies      Not Specified: Aspirin   Exam Information     Status Exam Begun  Exam Ended    Final [99] 2/21/2020 18:32 2/21/2020 18:54   Result Information     Status: Final result (Exam End: 2/21/2020 18:54) Provider Status: Open   Study Result     EXAM:  AP Portable Chest X-ray 1 view      INDICATION: COPD exacerbation     COMPARISON: February 20, 2020, CT dated October 31, 2019.     _______________     FINDINGS:  Heart and mediastinal contours are within normal limits for portable  radiograph. Lungs are clear of active disease. There are no pleural effusions.   Again seen is a teardrop shaped rim calcified density projecting over the right  midlung likely from prior pleural infection/inflammation. This remains stable. There is pleural plaque seen at the right lung base. No acute osseous findings.     ________________        IMPRESSION  IMPRESSION:  No acute process. Pleural plaque seen on the right.

## 2020-02-22 NOTE — PROGRESS NOTES
2205-Verbal report received from Iman Ramirez RN in ED. Sbar, mar, labs, kardex, ED summary and patient status reviewed. Pt to be transferred to ICU 9.    2220-Pt received in ICU 9 via stretcher with ED RN. x2 RN skin assessment performed. Skin intact, some keratin-like areas noted on back. Pt on 2L/nc on transport. Pt transferred self to ICU bed. After exertion, patient very SOB and requesting to go back on Bipap. Placed on Bipap without difficulty and respirations less labored. Coarse upper lung sounds and diminished bases bilaterally. No complaint of pain at this time, only requesting water/ice chips. Educated on keeping Bipap on until able to breath a little easier, swabbed mouth and will address some water once more stable. 0000-Assessment completed. Patient requesting water at this time. Placed on 3lnc, sats 99% and given sips of water without difficulty. Remains on NC at this time. 0115-Bedside verbal report given to Jazmín Allen RN. Sbar, mar, labs, kardex and patient status reviewed. Relinquished care of patient.

## 2020-02-22 NOTE — ROUTINE PROCESS
1800: received telephone SBAR report from Bri Cabrales RN (offgoing nurse) and assumed care of the pt. Updated white board, assessed all current needs, took a full set of vitals, left bed in lowest position with wheels locked and call light within reach. 1900: Shift summary, shift uneventful, no complaints of chest pain or shortness of breath.      [facility], RN

## 2020-02-23 LAB
ALBUMIN SERPL-MCNC: 2.7 G/DL (ref 3.4–5)
ALBUMIN/GLOB SERPL: 1 {RATIO} (ref 0.8–1.7)
ALP SERPL-CCNC: 68 U/L (ref 45–117)
ALT SERPL-CCNC: 11 U/L (ref 16–61)
ANION GAP SERPL CALC-SCNC: 7 MMOL/L (ref 3–18)
AST SERPL-CCNC: 9 U/L (ref 10–38)
ATRIAL RATE: 97 BPM
BILIRUB SERPL-MCNC: 0.2 MG/DL (ref 0.2–1)
BUN SERPL-MCNC: 24 MG/DL (ref 7–18)
BUN/CREAT SERPL: 21 (ref 12–20)
CALCIUM SERPL-MCNC: 7.9 MG/DL (ref 8.5–10.1)
CALCULATED P AXIS, ECG09: 48 DEGREES
CALCULATED R AXIS, ECG10: 24 DEGREES
CALCULATED T AXIS, ECG11: 50 DEGREES
CHLORIDE SERPL-SCNC: 101 MMOL/L (ref 100–111)
CO2 SERPL-SCNC: 26 MMOL/L (ref 21–32)
CREAT SERPL-MCNC: 1.14 MG/DL (ref 0.6–1.3)
DIAGNOSIS, 93000: NORMAL
ERYTHROCYTE [DISTWIDTH] IN BLOOD BY AUTOMATED COUNT: 13.6 % (ref 11.6–14.5)
GLOBULIN SER CALC-MCNC: 2.8 G/DL (ref 2–4)
GLUCOSE BLD STRIP.AUTO-MCNC: 139 MG/DL (ref 70–110)
GLUCOSE BLD STRIP.AUTO-MCNC: 140 MG/DL (ref 70–110)
GLUCOSE BLD STRIP.AUTO-MCNC: 160 MG/DL (ref 70–110)
GLUCOSE SERPL-MCNC: 139 MG/DL (ref 74–99)
HCT VFR BLD AUTO: 28.4 % (ref 36–48)
HGB BLD-MCNC: 9.2 G/DL (ref 13–16)
MCH RBC QN AUTO: 28.1 PG (ref 24–34)
MCHC RBC AUTO-ENTMCNC: 32.4 G/DL (ref 31–37)
MCV RBC AUTO: 86.9 FL (ref 74–97)
P-R INTERVAL, ECG05: 162 MS
PLATELET # BLD AUTO: 283 K/UL (ref 135–420)
PMV BLD AUTO: 8.9 FL (ref 9.2–11.8)
POTASSIUM SERPL-SCNC: 4.1 MMOL/L (ref 3.5–5.5)
PROT SERPL-MCNC: 5.5 G/DL (ref 6.4–8.2)
Q-T INTERVAL, ECG07: 332 MS
QRS DURATION, ECG06: 82 MS
QTC CALCULATION (BEZET), ECG08: 421 MS
RBC # BLD AUTO: 3.27 M/UL (ref 4.7–5.5)
SODIUM SERPL-SCNC: 134 MMOL/L (ref 136–145)
VENTRICULAR RATE, ECG03: 97 BPM
WBC # BLD AUTO: 10.3 K/UL (ref 4.6–13.2)

## 2020-02-23 PROCEDURE — 77010033678 HC OXYGEN DAILY

## 2020-02-23 PROCEDURE — 74011000250 HC RX REV CODE- 250: Performed by: HOSPITALIST

## 2020-02-23 PROCEDURE — 74011250636 HC RX REV CODE- 250/636: Performed by: FAMILY MEDICINE

## 2020-02-23 PROCEDURE — C9113 INJ PANTOPRAZOLE SODIUM, VIA: HCPCS | Performed by: FAMILY MEDICINE

## 2020-02-23 PROCEDURE — 74011636637 HC RX REV CODE- 636/637: Performed by: FAMILY MEDICINE

## 2020-02-23 PROCEDURE — 80053 COMPREHEN METABOLIC PANEL: CPT

## 2020-02-23 PROCEDURE — 36415 COLL VENOUS BLD VENIPUNCTURE: CPT

## 2020-02-23 PROCEDURE — 74011000250 HC RX REV CODE- 250: Performed by: FAMILY MEDICINE

## 2020-02-23 PROCEDURE — 94640 AIRWAY INHALATION TREATMENT: CPT

## 2020-02-23 PROCEDURE — 74011000250 HC RX REV CODE- 250: Performed by: INTERNAL MEDICINE

## 2020-02-23 PROCEDURE — 65660000000 HC RM CCU STEPDOWN

## 2020-02-23 PROCEDURE — 74011250637 HC RX REV CODE- 250/637: Performed by: FAMILY MEDICINE

## 2020-02-23 PROCEDURE — 74011250636 HC RX REV CODE- 250/636: Performed by: INTERNAL MEDICINE

## 2020-02-23 PROCEDURE — 82962 GLUCOSE BLOOD TEST: CPT

## 2020-02-23 PROCEDURE — 85027 COMPLETE CBC AUTOMATED: CPT

## 2020-02-23 RX ORDER — LEVALBUTEROL 1.25 MG/.5ML
1.25 SOLUTION, CONCENTRATE RESPIRATORY (INHALATION)
Status: DISCONTINUED | OUTPATIENT
Start: 2020-02-23 | End: 2020-02-25 | Stop reason: HOSPADM

## 2020-02-23 RX ADMIN — BUDESONIDE 500 MCG: 0.5 INHALANT RESPIRATORY (INHALATION) at 07:14

## 2020-02-23 RX ADMIN — CEFTRIAXONE 1 G: 1 INJECTION, POWDER, FOR SOLUTION INTRAMUSCULAR; INTRAVENOUS at 13:18

## 2020-02-23 RX ADMIN — BUDESONIDE 500 MCG: 0.5 INHALANT RESPIRATORY (INHALATION) at 20:31

## 2020-02-23 RX ADMIN — HEPARIN SODIUM 5000 UNITS: 5000 INJECTION INTRAVENOUS; SUBCUTANEOUS at 17:29

## 2020-02-23 RX ADMIN — DORNASE ALFA 2.5 MG: 1 SOLUTION RESPIRATORY (INHALATION) at 20:00

## 2020-02-23 RX ADMIN — METHYLPREDNISOLONE SODIUM SUCCINATE 60 MG: 125 INJECTION, POWDER, FOR SOLUTION INTRAMUSCULAR; INTRAVENOUS at 12:12

## 2020-02-23 RX ADMIN — IPRATROPIUM BROMIDE AND ALBUTEROL SULFATE 3 ML: .5; 3 SOLUTION RESPIRATORY (INHALATION) at 03:24

## 2020-02-23 RX ADMIN — Medication 10 ML: at 06:09

## 2020-02-23 RX ADMIN — INSULIN LISPRO 2 UNITS: 100 INJECTION, SOLUTION INTRAVENOUS; SUBCUTANEOUS at 17:28

## 2020-02-23 RX ADMIN — LEVALBUTEROL 1.25 MG: 1.25 SOLUTION, CONCENTRATE RESPIRATORY (INHALATION) at 15:13

## 2020-02-23 RX ADMIN — METHYLPREDNISOLONE SODIUM SUCCINATE 60 MG: 125 INJECTION, POWDER, FOR SOLUTION INTRAMUSCULAR; INTRAVENOUS at 06:08

## 2020-02-23 RX ADMIN — IPRATROPIUM BROMIDE AND ALBUTEROL SULFATE 3 ML: .5; 3 SOLUTION RESPIRATORY (INHALATION) at 11:17

## 2020-02-23 RX ADMIN — TAMSULOSIN HYDROCHLORIDE 0.4 MG: 0.4 CAPSULE ORAL at 09:34

## 2020-02-23 RX ADMIN — SODIUM CHLORIDE 40 MG: 9 INJECTION, SOLUTION INTRAMUSCULAR; INTRAVENOUS; SUBCUTANEOUS at 09:34

## 2020-02-23 RX ADMIN — DORNASE ALFA 2.5 MG: 1 SOLUTION RESPIRATORY (INHALATION) at 07:14

## 2020-02-23 RX ADMIN — HEPARIN SODIUM 5000 UNITS: 5000 INJECTION INTRAVENOUS; SUBCUTANEOUS at 09:34

## 2020-02-23 RX ADMIN — Medication 10 ML: at 12:12

## 2020-02-23 RX ADMIN — IPRATROPIUM BROMIDE AND ALBUTEROL SULFATE 3 ML: .5; 3 SOLUTION RESPIRATORY (INHALATION) at 07:14

## 2020-02-23 NOTE — PROGRESS NOTES
Pt having frequent coughing episodes which has been resulting in large amounts of sputum production. Pt does not feel that he is able to wear bipap for HS tonight for this reason. RT consulted with RN and decision was made to hold bipap tonight and continue to monitor pt. Bipap at bedside if needed. Will continue with duoneb txs as scheduled.

## 2020-02-23 NOTE — ROUTINE PROCESS
Assume care of patient from [facility], [address] [facility]. Patient received in bed awake. Patient A&Ox4, denies pain and discomfort. No distress noted. Pt on 3L via NC. Frequently use items within reach. Bed locked in low position. Call bell within reach and patient verbalized understanding of use for assistance and needs. 5120- Bedside and Verbal shift change report given to CAITLIN Will (oncoming nurse) by Olman Baker RN (offgoing nurse). Report included the following information SBAR, Kardex, Intake/Output, MAR and Recent Results.

## 2020-02-23 NOTE — PROGRESS NOTES
Problem: Falls - Risk of  Goal: *Absence of Falls  Description  Document Demetrio Kilpatrick Fall Risk and appropriate interventions in the flowsheet.   Outcome: Progressing Towards Goal  Note: Fall Risk Interventions:  Mobility Interventions: Bed/chair exit alarm, Communicate number of staff needed for ambulation/transfer, Patient to call before getting OOB, Utilize walker, cane, or other assistive device         Medication Interventions: Bed/chair exit alarm, Patient to call before getting OOB, Teach patient to arise slowly    Elimination Interventions: Bed/chair exit alarm, Call light in reach, Patient to call for help with toileting needs, Stay With Me (per policy), Toilet paper/wipes in reach, Toileting schedule/hourly rounds              Problem: Patient Education: Go to Patient Education Activity  Goal: Patient/Family Education  Outcome: Progressing Towards Goal     Problem: Patient Education: Go to Patient Education Activity  Goal: Patient/Family Education  Outcome: Progressing Towards Goal     Problem: Chronic Renal Failure  Goal: *Fluid and electrolytes stabilized  Outcome: Progressing Towards Goal     Problem: Chronic Obstructive Pulmonary Disease (COPD)  Goal: *Oxygen saturation during activity within specified parameters  Outcome: Progressing Towards Goal  Goal: *Able to remain out of bed as prescribed  Outcome: Progressing Towards Goal  Goal: *Absence of hypoxia  Outcome: Progressing Towards Goal  Goal: *Optimize nutritional status  Outcome: Progressing Towards Goal     Problem: Gas Exchange - Impaired  Goal: *Absence of hypoxia  Outcome: Progressing Towards Goal     Problem: Patient Education: Go to Patient Education Activity  Goal: Patient/Family Education  Outcome: Progressing Towards Goal     Problem: Pain  Goal: *Control of Pain  Outcome: Progressing Towards Goal  Goal: *PALLIATIVE CARE:  Alleviation of Pain  Outcome: Progressing Towards Goal     Problem: Patient Education: Go to Patient Education Activity  Goal: Patient/Family Education  Outcome: Progressing Towards Goal     Problem: Pressure Injury - Risk of  Goal: *Prevention of pressure injury  Description  Document Nikos Scale and appropriate interventions in the flowsheet. Outcome: Progressing Towards Goal  Note: Pressure Injury Interventions:             Activity Interventions: Pressure redistribution bed/mattress(bed type), PT/OT evaluation, Increase time out of bed    Mobility Interventions: HOB 30 degrees or less, Pressure redistribution bed/mattress (bed type), PT/OT evaluation    Nutrition Interventions: Document food/fluid/supplement intake, Offer support with meals,snacks and hydration                     Problem: Patient Education: Go to Patient Education Activity  Goal: Patient/Family Education  Outcome: Progressing Towards Goal

## 2020-02-23 NOTE — ROUTINE PROCESS
Bedside shift change report given to Ernie Alberts RN (oncoming nurse) by Lula Spence RN (offgoing nurse). Report included the following information SBAR, Kardex, Intake/Output, MAR and Cardiac Rhythm SR w/PVCs.      Visit Vitals  /63 (BP 1 Location: Left arm, BP Patient Position: At rest)   Pulse 95   Temp 98 °F (36.7 °C)   Resp 24   Ht 5' 8\" (1.727 m)   Wt 90.7 kg (200 lb)   SpO2 98%   BMI 30.41 kg/m²     Lula Spence RN

## 2020-02-23 NOTE — PROGRESS NOTES
Problem: Falls - Risk of  Goal: *Absence of Falls  Description  Document Ros Alonso Fall Risk and appropriate interventions in the flowsheet.   Outcome: Progressing Towards Goal  Note: Fall Risk Interventions:  Mobility Interventions: Assess mobility with egress test, Bed/chair exit alarm, Patient to call before getting OOB, PT Consult for mobility concerns, PT Consult for assist device competence, Strengthening exercises (ROM-active/passive), Utilize walker, cane, or other assistive device         Medication Interventions: Assess postural VS orthostatic hypotension, Bed/chair exit alarm, Patient to call before getting OOB, Teach patient to arise slowly    Elimination Interventions: Bed/chair exit alarm, Call light in reach, Patient to call for help with toileting needs, Stay With Me (per policy), Toileting schedule/hourly rounds, Toilet paper/wipes in reach, Urinal in reach              Problem: Patient Education: Go to Patient Education Activity  Goal: Patient/Family Education  Outcome: Progressing Towards Goal     Problem: Patient Education: Go to Patient Education Activity  Goal: Patient/Family Education  Outcome: Progressing Towards Goal     Problem: Chronic Renal Failure  Goal: *Fluid and electrolytes stabilized  Outcome: Progressing Towards Goal     Problem: Chronic Obstructive Pulmonary Disease (COPD)  Goal: *Oxygen saturation during activity within specified parameters  Outcome: Progressing Towards Goal  Goal: *Able to remain out of bed as prescribed  Outcome: Progressing Towards Goal  Goal: *Absence of hypoxia  Outcome: Progressing Towards Goal  Goal: *Optimize nutritional status  Outcome: Progressing Towards Goal     Problem: Gas Exchange - Impaired  Goal: *Absence of hypoxia  Outcome: Progressing Towards Goal     Problem: Patient Education: Go to Patient Education Activity  Goal: Patient/Family Education  Outcome: Progressing Towards Goal     Problem: Pain  Goal: *Control of Pain  Outcome: Progressing Towards Goal  Goal: *PALLIATIVE CARE:  Alleviation of Pain  Outcome: Progressing Towards Goal     Problem: Patient Education: Go to Patient Education Activity  Goal: Patient/Family Education  Outcome: Progressing Towards Goal     Problem: Pressure Injury - Risk of  Goal: *Prevention of pressure injury  Description  Document Nikos Scale and appropriate interventions in the flowsheet. Outcome: Progressing Towards Goal  Note: Pressure Injury Interventions:             Activity Interventions: Increase time out of bed, Pressure redistribution bed/mattress(bed type), PT/OT evaluation    Mobility Interventions: HOB 30 degrees or less, Pressure redistribution bed/mattress (bed type), PT/OT evaluation    Nutrition Interventions: Document food/fluid/supplement intake, Offer support with meals,snacks and hydration                     Problem: Patient Education: Go to Patient Education Activity  Goal: Patient/Family Education  Outcome: Progressing Towards Goal    Martín Brown RN

## 2020-02-23 NOTE — ROUTINE PROCESS
0700: received bedside shift report from Kai Glover RN (offgoing nurse) and assumed care of the pt. Updated white board, assessed all current needs, left bed in lowest position with wheels locked and call light within reach. 0730: night shift let me know that the pt is going in and out of afib. Rate anywhere from 1 teens to 150s. Have placed a page out to Dr. Tang Palacio to let him know, waiting on a call back. 1000: still no work back from Dr. Tang Palacio, pt has since gone back into sinus rhythm. Will let Dr. Tang Palacio know when he comes up to round on the floor. Will continue to monitor pt's heart rate and rhythm. 1900: Shift summary, shift uneventful, no complaints of chest pain or shortness of breath.      Carrie Vieyra RN

## 2020-02-23 NOTE — PROGRESS NOTES
[facility]  Pulmonary, Critical Care, and Sleep Medicine    Pulm Note    Name: Shawn Perez MRN: [id number]   : 1945 Hospital: [facility] [place]   Date: 2020  Room: [facility]     Subjective: This patient has been seen and evaluated at the request of Dr. Turner Burks for COPD exacerbation. Patient is a 66 y.o. male - known to Dr Teresa Iyer in the office. He has chronic COPD, home o2 at 2 L, ex-smoker, used to work in the [employer], hx of asbestosis; hx of CKD and parox AF. He came to ER with worsening SOB yesterday. He needed BIPAP and was admitted to the icu. He has frequent admissions for COPD exac.    20  Patient in tele geller  He could not use BIPAP last night due to cough  Breathing is good  On nc o2  Cough + and has thick green-yellow sputum. No chest pains or hemoptysis. SH - stopped smoking 9 mths ago; used to smoke 1.5 ppd    Occup - used to work in [employer] mostly as , and other jobs      Past Medical History:   Diagnosis Date    Brain aneurysm     Cancer ([address]     prostate    COPD (chronic obstructive pulmonary disease) ([address]     Hypertension     Nocturia     Pneumonia     Radiation effect       Past Surgical History:   Procedure Laterality Date    HX HERNIA REPAIR        Prior to Admission medications    Medication Sig Start Date End Date Taking? Authorizing Provider   chlorthalidone (HYGROTEN) 25 mg tablet Take 25 mg by mouth daily. Other, MD Blayne   predniSONE (DELTASONE) 50 mg tablet Take 1 Tab by mouth daily for 4 days. 20  Roxie Rodríguez MD   acetaminophen (TYLENOL) 325 mg tablet Take 500 mg by mouth every four (4) hours as needed for Pain. Provider, Historical   OXYGEN-AIR DELIVERY SYSTEMS Take 2 L by inhalation continuous. Provider, Historical   dextran 70/hypromellose (ARTIFICIAL TEARS, PF, OP) Apply 2 Drops to eye as needed for Other (both eyes for dryness).     Provider, Historical   diphenhydrAMINE (BENADRYL) 25 mg capsule Take 25 mg by mouth nightly as needed for Itching. Provider, Historical   albuterol-ipratropium (DUO-NEB) 2.5 mg-0.5 mg/3 ml nebu 3 mL by Nebulization route every four (4) hours as needed. Patient taking differently: 3 mL by Nebulization route every four (4) hours as needed for Other (Shortness of breath). 2/9/19   Aliza Lindsey DO   albuterol (PROVENTIL HFA, VENTOLIN HFA, PROAIR HFA) 90 mcg/actuation inhaler Take 2 Puffs by inhalation every four (4) hours as needed for Wheezing or Shortness of Breath. 4/23/18   Farhan Charles PA-C   ipratropium (ATROVENT) 0.03 % nasal spray 2 Sprays by Both Nostrils route every twelve (12) hours as needed for Rhinitis. Other, MD Blayne   tamsulosin (FLOMAX) 0.4 mg capsule Take 0.4 mg by mouth every evening.     Shant, MD Blayne     Allergies   Allergen Reactions    Aspirin Other (comments)     \"messes my stomach up\"      Social History     Tobacco Use    Smoking status: Former Smoker     Types: Cigarettes    Smokeless tobacco: Never Used   Substance Use Topics    Alcohol use: No      Family History   Problem Relation Age of Onset    Heart Disease Mother         Current Facility-Administered Medications   Medication Dose Route Frequency    budesonide (PULMICORT) 500 mcg/2 ml nebulizer suspension  500 mcg Nebulization BID RT    methylPREDNISolone (PF) (Solu-MEDROL) injection 60 mg  60 mg IntraVENous Q6H    dornase alpha (PULMOZYME)2.5mg/2.5ml nebulizer solution  2.5 mg Nebulization BID RT    cefTRIAXone (ROCEPHIN) 1 g in sterile water (preservative free) 10 mL IV syringe  1 g IntraVENous Q24H    sodium chloride (NS) flush 5-40 mL  5-40 mL IntraVENous Q8H    pantoprazole (PROTONIX) 40 mg in 0.9% sodium chloride 10 mL injection  40 mg IntraVENous DAILY    0.9% sodium chloride infusion  50 mL/hr IntraVENous CONTINUOUS    azithromycin (ZITHROMAX) 500 mg in 0.9% sodium chloride 250 mL (VIAL-MATE)  500 mg IntraVENous Q24H    insulin lispro (HUMALOG) injection   SubCUTAneous Q6H    heparin (porcine) injection 5,000 Units  5,000 Units SubCUTAneous Q8H    tamsulosin (FLOMAX) capsule 0.4 mg  0.4 mg Oral DAILY       Review of Systems:  Limited due to patient condition   Severe hard of hearing  No CP or hemoptysis  No fever or chills  No abd pains or vomiting    Objective:   Vital Signs:    Visit Vitals  /60 (BP 1 Location: Right arm, BP Patient Position: At rest)   Pulse 82   Temp 98.6 °F (37 °C)   Resp 28   Ht 5' 8\" (1.727 m)   Wt 94.9 kg (209 lb 4.8 oz)   SpO2 99%   BMI 31.82 kg/m²       O2 Device: Nasal cannula   O2 Flow Rate (L/min): 2 l/min   Temp (24hrs), Av.1 °F (36.7 °C), Min:97.5 °F (36.4 °C), Max:98.6 °F (37 °C)       Intake/Output:   Last shift:       07 -  1900  In: 360 [P.O.:360]  Out: 450 [Urine:450]  Last 3 shifts:  190 -  0700  In: 2824.2 [P.O.:360; I.V.:2464.2]  Out: 3160 [Urine:3160]    Intake/Output Summary (Last 24 hours) at 2020 1343  Last data filed at 2020 1134  Gross per 24 hour   Intake 2380.83 ml   Output 2160 ml   Net 220.83 ml         Physical Exam:   Comfortable; on 2 lits nc; acyanotic  HEENT: pupils not dilated, no scleral jaundice  Neck: No adenopathy or thyroid swelling  CVS: S1S2 no murmurs; JVD not elevated  RS: Mod air entry bilaterally, no wheezes; cough +; decreased BS at RT base; faint bilateral basal crackles  Abd: soft, non tender, no abd distension, obese abd  Neuro: non focal, awake, alert  Extrm: no leg edema or swelling or clubbing  Skin: no rash  Lymphatic: no cervical or supraclavicular adenopathy  Psych: normal mood      Data review:     Recent Results (from the past 24 hour(s))   GLUCOSE, POC    Collection Time: 20  6:42 PM   Result Value Ref Range    Glucose (POC) 197 (H) 70 - 110 mg/dL   GLUCOSE, POC    Collection Time: 20 11:31 PM   Result Value Ref Range    Glucose (POC) 141 (H) 70 - 110 mg/dL   CBC W/O DIFF    Collection Time: 20  1:35 AM Result Value Ref Range    WBC 10.3 4.6 - 13.2 K/uL    RBC 3.27 (L) 4.70 - 5.50 M/uL    HGB 9.2 (L) 13.0 - 16.0 g/dL    HCT 28.4 (L) 36.0 - 48.0 %    MCV 86.9 74.0 - 97.0 FL    MCH 28.1 24.0 - 34.0 PG    MCHC 32.4 31.0 - 37.0 g/dL    RDW 13.6 11.6 - 14.5 %    PLATELET 187 749 - 400 K/uL    MPV 8.9 (L) 9.2 - 80.9 FL   METABOLIC PANEL, COMPREHENSIVE    Collection Time: 02/23/20  1:35 AM   Result Value Ref Range    Sodium 134 (L) 136 - 145 mmol/L    Potassium 4.1 3.5 - 5.5 mmol/L    Chloride 101 100 - 111 mmol/L    CO2 26 21 - 32 mmol/L    Anion gap 7 3.0 - 18 mmol/L    Glucose 139 (H) 74 - 99 mg/dL    BUN 24 (H) 7.0 - 18 MG/DL    Creatinine 1.14 0.6 - 1.3 MG/DL    BUN/Creatinine ratio 21 (H) 12 - 20      GFR est AA >60 >60 ml/min/1.73m2    GFR est non-AA >60 >60 ml/min/1.73m2    Calcium 7.9 (L) 8.5 - 10.1 MG/DL    Bilirubin, total 0.2 0.2 - 1.0 MG/DL    ALT (SGPT) 11 (L) 16 - 61 U/L    AST (SGOT) 9 (L) 10 - 38 U/L    Alk.  phosphatase 68 45 - 117 U/L    Protein, total 5.5 (L) 6.4 - 8.2 g/dL    Albumin 2.7 (L) 3.4 - 5.0 g/dL    Globulin 2.8 2.0 - 4.0 g/dL    A-G Ratio 1.0 0.8 - 1.7     GLUCOSE, POC    Collection Time: 02/23/20  6:06 AM   Result Value Ref Range    Glucose (POC) 140 (H) 70 - 110 mg/dL   GLUCOSE, POC    Collection Time: 02/23/20 11:38 AM   Result Value Ref Range    Glucose (POC) 139 (H) 70 - 110 mg/dL           Recent Labs     02/21/20 1829   FIO2I 35   HCO3I 23.8   PCO2I 44.5   PHI 7.337*   PO2I 176*       All Micro Results     Procedure Component Value Units Date/Time    CULTURE, BLOOD [[id number]] Collected:  02/21/20 1820    Order Status:  Completed Specimen:  Blood Updated:  02/23/20 0712     Special Requests: NO SPECIAL REQUESTS        Culture result: NO GROWTH 2 DAYS       CULTURE, BLOOD [[id number]] Collected:  02/21/20 1820    Order Status:  Completed Specimen:  Blood Updated:  02/23/20 0712     Special Requests: NO SPECIAL REQUESTS        Culture result: NO GROWTH 2 DAYS       CULTURE, RESPIRATORY/SPUTUM/BRONCH Angelito Alpers STAIN [[id number]]     Order Status:  Sent Specimen:  Sputum     INFLUENZA A & B AG (RAPID TEST) [[id number]] Collected:  02/22/20 0000    Order Status:  Completed Specimen:  Nasopharyngeal from Nasal washing Updated:  02/22/20 0831     Influenza A Antigen NEGATIVE         Comment: A negative result does not exclude influenza virus infection, seasonal or H1N1 due to suboptimal sensitivity. If influenza is circulating in your community, a diagnosis of influenza should be considered based on a patients clinical presentation and empiric antiviral treatment should be considered, if indicated. Influenza B Antigen NEGATIVE              ECHO OCT 2019  Interpretation Summary     Result status: Final result   · Left Ventricle: Normal cavity size and systolic function (ejection fraction normal). Mild concentric hypertrophy. Estimated left ventricular ejection fraction is 51 - 55%. Mild (grade 1) left ventricular diastolic dysfunction. · Aortic Valve: Probably trileaflet aortic valve. · IVC/Hepatic Veins: Moderately elevated central venous pressure (10-15 mmHg); IVC diameter is larger than 21 mm and collapses more than 50% with respiration. Imaging:  [x]I have personally reviewed the patients chest radiographs images and report     CXR 2/21  Results from Hospital Encounter encounter on 02/21/20   XR CHEST PORT    Narrative EXAM:  AP Portable Chest X-ray 1 view     INDICATION: COPD exacerbation    COMPARISON: February 20, 2020, CT dated October 31, 2019.    _______________    FINDINGS:  Heart and mediastinal contours are within normal limits for portable  radiograph. Lungs are clear of active disease. There are no pleural effusions. Again seen is a teardrop shaped rim calcified density projecting over the right  midlung likely from prior pleural infection/inflammation. This remains stable. There is pleural plaque seen at the right lung base.  No acute osseous findings. ________________         Impression IMPRESSION:  No acute process. Pleural plaque seen on the right. CT 10/31/2019  Results from Hospital Encounter encounter on 10/30/19   CTA CHEST W OR W WO CONT    Narrative EXAM: CTA Chest    INDICATION: Acute on chronic respiratory failure with hypoxia and hypercapnia. Evaluation for potential embolism. COMPARISON: Correlation made with prior CT scan of the chest obtained July 7, 2019. Correlation made with multiple prior radiographs, most recent 10/30/2019    TECHNIQUE: Axial CT imaging from the thoracic inlet through the diaphragm with  intravenous contrast utilizing CTA study for pulmonary artery evaluation. Coronal and sagittal MIP reformations were generated at a separate workstation. One or more dose reduction techniques were used on this CT: automated exposure  control, adjustment of the mAs and/or kVp according to patient size, and  iterative reconstruction techniques. The specific techniques used on this CT  exam have been documented in the patient's electronic medical record. Digital  Imaging and Communications in Medicine (DICOM) format image data are available  to nonaffiliated external healthcare facilities or entities on a secure, media  free, reciprocally searchable basis with patient authorization for at least a  12-month period after this study. _______________    FINDINGS:    EXAM QUALITY: Overall exam quality is adequate. Pulmonary arterial enhancement  is adequate. The breath hold is satisfactory. PULMONARY ARTERIES: No convincing evidence of pulmonary embolism. MEDIASTINUM: Included thyroid gland contains several small hypodensities which  are too small to require further characterization. Great vessels are of normal  caliber. Thoracic aorta normal in course and caliber. Normal cardiac size. No  pericardial effusion. Small quantity of fluid within the superior pericardial  recess.     LYMPH NODES: No enlarged mediastinal or hilar nodes by size criteria. AIRWAY: Central airways are patent. Small quantity of endobronchial secretions  noted at the distal right mainstem bronchus. No discrete foci of endobronchial  mucoid impaction. LUNGS: There are basilar areas of atelectasis which involve the lower lobes  bilaterally. Significantly improved aeration of the left lower lobe noted in the  interval from prior CT scan. No significant groundglass abnormality or septal  line thickening. No alveolar consolidation. PLEURA: As previously, elliptical morphology peripherally calcified structures  are noted within the anterior aspect of the right pleural space with additional  posterior right hemithoracic peripherally calcified pleural space collection  redemonstrated. This appears unchanged from examination of March 19, 2017 and  demonstrates radiographic stability from 2014. No evidence of pneumothorax or  pleural effusion. UPPER ABDOMEN: Small hiatal hernia. Multiple punctate granulomata throughout the  liver. No acute abnormality present. .    OTHER: No acute or aggressive osseous abnormalities identified. _______________      Impression IMPRESSION:    1. No evidence of pulmonary embolism. 2.  Basilar areas of atelectasis without findings to suggest pneumonia,  pulmonary edema, or pleural effusion. 3. 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 4. Small hiatal hernia.          IMPRESSION:   · Acute on chronic resp failure - hypoxemic- J96.21  · COPD exacerbation- J44.1  · Asbestosis- J61  · RT sided pleural effusion (loculated)- J90  · CKD stage 2- N18.2  · Parox Afib - I48.0  ·         RECOMMENDATIONS:   · -respirations improved; plan for CXR tomorrow  · -currently nc o2; wean fio2 for o2 sats >91%  · -could not tolerate BIPAP last night; consider using BIPAP as needed  · -Budesonide and pulmozyme nebs bid; avoid LABA nebs due to parox Afib hx  · -prn Levalbuterol nebs  · -IV steroids - decreased solumedrol 40 mg q8  · -BG stable; on SSI  · -ID: ceftriaxone and azithromycin empiric x 5-7days; follow sputum cxs  · Fluids: oral  · Proph:  DVt and GI proph- sc heparin and PPI  · D.w patient and updated management  ·    Will defer respective systems problem management to primary and other consultants and follow patient with primary and other medical team  Further recommendations will be based on the patient's response to recommended treatment and results of the investigation ordered.   · Lines/Tubes: PIVs  ADVANCE DIRECTIVE: Full Code     Moderate complexity decision making was performed in this consultation and evaluation of this patient        Keaton Bowen MD

## 2020-02-23 NOTE — PROGRESS NOTES
Hospitalist Progress Note    Patient: Chelle Adler MRN: [id number]  [facility]: [id number]    YOB: 1945  Age: 66 y.o. Sex: male    DOA: 2/21/2020 LOS:  LOS: 2 days                Assessment/Plan     Patient Active Problem List   Diagnosis Code    Hypertension I10    COPD (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 ([facility] J44.9    Chronic renal disease, stage 3, moderately decreased glomerular filtration rate between 30-59 mL/min/1.73 square meter ([facility]) N18.3    Asbestosis ([facility] J61    Acute exacerbation of chronic obstructive pulmonary disease (COPD) ([facility] J44.1    Advanced care planning/counseling discussion Z71.89    Debility R53.81    Pneumonia of right lower lobe due to methicillin susceptible Staphylococcus aureus (MSSA) ([facility] J15.211    Paroxysmal atrial fibrillation ([facility]) I48.0    Chronic respiratory failure with hypoxia ([facility]) J96.11    COPD (chronic obstructive pulmonary disease) ([facility] J44.9    Acute on chronic respiratory failure with hypoxia and hypercapnia ([facility]) J96.21, J96.22    Atrial fibrillation with RVR ([facility]) I48.91    Acute respiratory failure ([facility]) J96.00    Tobacco dependence F17.200    Leukocytosis D72.829    Hyponatremia E87.1    Acute-on-chronic kidney injury ([facility] N17.9, N18.9    Chronic kidney disease, stage 2 (mild) N18.2    Pleural effusion, right J90        67 y/o male with hx of CKD, COPD, HTN, asbestosis, and PAF admitted for COPD exacerbation and acute respiratory failure requiring BiPAP. He also has hyponatremia likely from diuretic use. Acute on Chronic respiratory failure -   Off BiPAP  continue with home oxygen at 2L NC     COPD exacerbation -   on Intravenous steroids. Xopenex. Will not use duo neb due to A-fib  Empiric antibiotics.     He is  on chronic azithromycin. Continue with ceftriaxone and azithromycin     PAF - NSR  Went into a-fib after breathing treatment.  Switched back to NSR    URIEL -  Resolved.     HTN - controlled, monitor BP     DVT prophylaxis with heparin    PT/OT       Disposition : 1-2 days    Review of systems  General: No fevers or chills. Cardiovascular: No chest pain or pressure. No palpitations. Pulmonary: see above   Gastrointestinal: No nausea, vomiting. Physical Exam:  General: Awake, cooperative, no acute distress    HEENT: NC, Atraumatic. PERRLA, anicteric sclerae. Lungs: Exp wheezes bilaterally, decreased breath sounds lower lungs bilaterally. Heart:  S1 S2,  No murmur, No Rubs, No Gallops  Abdomen: Soft, Non distended, Non tender.  +Bowel sounds,   Extremities: No c/c/e  Psych:   Not anxious or agitated. Neurologic:  No acute neurological deficit. Vital signs/Intake and Output:  Visit Vitals  /60 (BP 1 Location: Right arm, BP Patient Position: At rest)   Pulse 82   Temp 98.6 °F (37 °C)   Resp 28   Ht 5' 8\" (1.727 m)   Wt 94.9 kg (209 lb 4.8 oz)   SpO2 99%   BMI 31.82 kg/m²     Current Shift:  02/23 0701 - 02/23 1900  In: 360 [P.O.:360]  Out: 450 [Urine:450]  Last three shifts:  02/21 1901 - 02/23 0700  In: 2824.2 [P.O.:360; I.V.:2464.2]  Out: 1177 [Urine:3160]            Labs: Results:       Chemistry Recent Labs     02/23/20 0135 02/22/20  0440 02/21/20  1820   * 138* 138*   * 133* 129*   K 4.1 4.2 4.8    99* 95*   CO2 26 24 25   BUN 24* 24* 24*   CREA 1.14 1.12 1.21   CA 7.9* 8.6 8.7   AGAP 7 10 9   BUCR 21* 21* 20   AP 68 81 102   TP 5.5* 6.5 6.9   ALB 2.7* 2.9* 3.6   GLOB 2.8 3.6 3.3   AGRAT 1.0 0.8 1.1      CBC w/Diff Recent Labs     02/23/20 0135 02/22/20  0440 02/21/20  1820   WBC 10.3 12.5 18.0*   RBC 3.27* 3.55* 3.92*   HGB 9.2* 9.9* 11.1*   HCT 28.4* 30.3* 33.4*    290 415   GRANS  --   --  90*   LYMPH  --   --  3*   EOS  --   --  0      Cardiac Enzymes Recent Labs     02/21/20  1820      CKND1 5.5*      Coagulation No results for input(s): PTP, INR, APTT, INREXT, INREXT in the last 72 hours.     Lipid Panel No results found for: CHOL, CHOLPOCT, CHOLX, CHLST, CHOLV, 372839, HDL, HDLP, LDL, LDLC, DLDLP, 021977, VLDLC, VLDL, TGLX, TRIGL, TRIGP, TGLPOCT, CHHD, CHHDX   BNP No results for input(s): BNPP in the last 72 hours.    Liver Enzymes Recent Labs     02/23/20  0135   TP 5.5*   ALB 2.7*   AP 68   SGOT 9*      Thyroid Studies Lab Results   Component Value Date/Time    TSH 0.86 10/20/2019 01:05 AM        Procedures/imaging: see electronic medical records for all procedures/Xrays and details which were not copied into this note but were reviewed prior to creation of Plan

## 2020-02-24 ENCOUNTER — APPOINTMENT (OUTPATIENT)
Dept: GENERAL RADIOLOGY | Age: 75
DRG: 189 | End: 2020-02-24
Attending: FAMILY MEDICINE
Payer: MEDICARE

## 2020-02-24 LAB
ALBUMIN SERPL-MCNC: 2.6 G/DL (ref 3.4–5)
ALBUMIN/GLOB SERPL: 0.9 {RATIO} (ref 0.8–1.7)
ALP SERPL-CCNC: 63 U/L (ref 45–117)
ALT SERPL-CCNC: 12 U/L (ref 16–61)
ANION GAP SERPL CALC-SCNC: 7 MMOL/L (ref 3–18)
AST SERPL-CCNC: 8 U/L (ref 10–38)
BILIRUB SERPL-MCNC: 0.2 MG/DL (ref 0.2–1)
BUN SERPL-MCNC: 31 MG/DL (ref 7–18)
BUN/CREAT SERPL: 27 (ref 12–20)
CALCIUM SERPL-MCNC: 8.1 MG/DL (ref 8.5–10.1)
CHLORIDE SERPL-SCNC: 104 MMOL/L (ref 100–111)
CO2 SERPL-SCNC: 27 MMOL/L (ref 21–32)
CREAT SERPL-MCNC: 1.16 MG/DL (ref 0.6–1.3)
ERYTHROCYTE [DISTWIDTH] IN BLOOD BY AUTOMATED COUNT: 13 % (ref 11.6–14.5)
GLOBULIN SER CALC-MCNC: 2.8 G/DL (ref 2–4)
GLUCOSE BLD STRIP.AUTO-MCNC: 124 MG/DL (ref 70–110)
GLUCOSE BLD STRIP.AUTO-MCNC: 132 MG/DL (ref 70–110)
GLUCOSE BLD STRIP.AUTO-MCNC: 167 MG/DL (ref 70–110)
GLUCOSE BLD STRIP.AUTO-MCNC: 181 MG/DL (ref 70–110)
GLUCOSE SERPL-MCNC: 126 MG/DL (ref 74–99)
HCT VFR BLD AUTO: 30.3 % (ref 36–48)
HGB BLD-MCNC: 9.7 G/DL (ref 13–16)
MCH RBC QN AUTO: 28.6 PG (ref 24–34)
MCHC RBC AUTO-ENTMCNC: 32 G/DL (ref 31–37)
MCV RBC AUTO: 89.4 FL (ref 74–97)
PLATELET # BLD AUTO: 301 K/UL (ref 135–420)
PMV BLD AUTO: 8.8 FL (ref 9.2–11.8)
POTASSIUM SERPL-SCNC: 4.1 MMOL/L (ref 3.5–5.5)
PROT SERPL-MCNC: 5.4 G/DL (ref 6.4–8.2)
RBC # BLD AUTO: 3.39 M/UL (ref 4.7–5.5)
SODIUM SERPL-SCNC: 138 MMOL/L (ref 136–145)
WBC # BLD AUTO: 7.9 K/UL (ref 4.6–13.2)

## 2020-02-24 PROCEDURE — 80053 COMPREHEN METABOLIC PANEL: CPT

## 2020-02-24 PROCEDURE — 74011000250 HC RX REV CODE- 250: Performed by: HOSPITALIST

## 2020-02-24 PROCEDURE — 74011636637 HC RX REV CODE- 636/637: Performed by: FAMILY MEDICINE

## 2020-02-24 PROCEDURE — 82962 GLUCOSE BLOOD TEST: CPT

## 2020-02-24 PROCEDURE — 94640 AIRWAY INHALATION TREATMENT: CPT

## 2020-02-24 PROCEDURE — 74011000250 HC RX REV CODE- 250: Performed by: INTERNAL MEDICINE

## 2020-02-24 PROCEDURE — 65660000000 HC RM CCU STEPDOWN

## 2020-02-24 PROCEDURE — C9113 INJ PANTOPRAZOLE SODIUM, VIA: HCPCS | Performed by: FAMILY MEDICINE

## 2020-02-24 PROCEDURE — 74011250636 HC RX REV CODE- 250/636: Performed by: INTERNAL MEDICINE

## 2020-02-24 PROCEDURE — 77010033678 HC OXYGEN DAILY

## 2020-02-24 PROCEDURE — 85027 COMPLETE CBC AUTOMATED: CPT

## 2020-02-24 PROCEDURE — 71045 X-RAY EXAM CHEST 1 VIEW: CPT

## 2020-02-24 PROCEDURE — 94760 N-INVAS EAR/PLS OXIMETRY 1: CPT

## 2020-02-24 PROCEDURE — 36415 COLL VENOUS BLD VENIPUNCTURE: CPT

## 2020-02-24 PROCEDURE — 74011250637 HC RX REV CODE- 250/637: Performed by: INTERNAL MEDICINE

## 2020-02-24 PROCEDURE — 92526 ORAL FUNCTION THERAPY: CPT

## 2020-02-24 PROCEDURE — 74011250636 HC RX REV CODE- 250/636: Performed by: FAMILY MEDICINE

## 2020-02-24 PROCEDURE — 77030013140 HC MSK NEB VYRM -A

## 2020-02-24 PROCEDURE — 74011250637 HC RX REV CODE- 250/637: Performed by: FAMILY MEDICINE

## 2020-02-24 RX ORDER — LEVOFLOXACIN 500 MG/1
500 TABLET, FILM COATED ORAL EVERY 24 HOURS
Status: DISCONTINUED | OUTPATIENT
Start: 2020-02-24 | End: 2020-02-25 | Stop reason: HOSPADM

## 2020-02-24 RX ORDER — CODEINE PHOSPHATE AND GUAIFENESIN 10; 100 MG/5ML; MG/5ML
5 SOLUTION ORAL
Status: DISCONTINUED | OUTPATIENT
Start: 2020-02-24 | End: 2020-02-25 | Stop reason: HOSPADM

## 2020-02-24 RX ADMIN — AZITHROMYCIN MONOHYDRATE 500 MG: 500 INJECTION, POWDER, LYOPHILIZED, FOR SOLUTION INTRAVENOUS at 00:01

## 2020-02-24 RX ADMIN — METHYLPREDNISOLONE SODIUM SUCCINATE 40 MG: 40 INJECTION, POWDER, FOR SOLUTION INTRAMUSCULAR; INTRAVENOUS at 13:39

## 2020-02-24 RX ADMIN — LEVOFLOXACIN 500 MG: 500 TABLET, FILM COATED ORAL at 18:29

## 2020-02-24 RX ADMIN — CEFTRIAXONE 1 G: 1 INJECTION, POWDER, FOR SOLUTION INTRAMUSCULAR; INTRAVENOUS at 13:39

## 2020-02-24 RX ADMIN — BUDESONIDE 500 MCG: 0.5 INHALANT RESPIRATORY (INHALATION) at 20:22

## 2020-02-24 RX ADMIN — BUDESONIDE 500 MCG: 0.5 INHALANT RESPIRATORY (INHALATION) at 08:00

## 2020-02-24 RX ADMIN — HEPARIN SODIUM 5000 UNITS: 5000 INJECTION INTRAVENOUS; SUBCUTANEOUS at 00:01

## 2020-02-24 RX ADMIN — HEPARIN SODIUM 5000 UNITS: 5000 INJECTION INTRAVENOUS; SUBCUTANEOUS at 09:40

## 2020-02-24 RX ADMIN — METHYLPREDNISOLONE SODIUM SUCCINATE 40 MG: 40 INJECTION, POWDER, FOR SOLUTION INTRAMUSCULAR; INTRAVENOUS at 00:00

## 2020-02-24 RX ADMIN — DORNASE ALFA 2.5 MG: 1 SOLUTION RESPIRATORY (INHALATION) at 20:00

## 2020-02-24 RX ADMIN — Medication 10 ML: at 13:41

## 2020-02-24 RX ADMIN — DORNASE ALFA 2.5 MG: 1 SOLUTION RESPIRATORY (INHALATION) at 08:00

## 2020-02-24 RX ADMIN — TAMSULOSIN HYDROCHLORIDE 0.4 MG: 0.4 CAPSULE ORAL at 09:40

## 2020-02-24 RX ADMIN — LEVALBUTEROL 1.25 MG: 1.25 SOLUTION, CONCENTRATE RESPIRATORY (INHALATION) at 00:15

## 2020-02-24 RX ADMIN — LEVALBUTEROL 1.25 MG: 1.25 SOLUTION, CONCENTRATE RESPIRATORY (INHALATION) at 06:12

## 2020-02-24 RX ADMIN — Medication 10 ML: at 00:03

## 2020-02-24 RX ADMIN — LEVALBUTEROL 1.25 MG: 1.25 SOLUTION, CONCENTRATE RESPIRATORY (INHALATION) at 16:41

## 2020-02-24 RX ADMIN — GUAIFENESIN AND CODEINE PHOSPHATE 5 ML: 100; 10 SOLUTION ORAL at 10:10

## 2020-02-24 RX ADMIN — GUAIFENESIN AND CODEINE PHOSPHATE 5 ML: 100; 10 SOLUTION ORAL at 03:37

## 2020-02-24 RX ADMIN — Medication 10 ML: at 06:14

## 2020-02-24 RX ADMIN — LEVALBUTEROL 1.25 MG: 1.25 SOLUTION, CONCENTRATE RESPIRATORY (INHALATION) at 23:03

## 2020-02-24 RX ADMIN — INSULIN LISPRO 2 UNITS: 100 INJECTION, SOLUTION INTRAVENOUS; SUBCUTANEOUS at 19:43

## 2020-02-24 RX ADMIN — LEVALBUTEROL 1.25 MG: 1.25 SOLUTION, CONCENTRATE RESPIRATORY (INHALATION) at 11:20

## 2020-02-24 RX ADMIN — METHYLPREDNISOLONE SODIUM SUCCINATE 40 MG: 40 INJECTION, POWDER, FOR SOLUTION INTRAMUSCULAR; INTRAVENOUS at 06:11

## 2020-02-24 RX ADMIN — METHYLPREDNISOLONE SODIUM SUCCINATE 40 MG: 40 INJECTION, POWDER, FOR SOLUTION INTRAMUSCULAR; INTRAVENOUS at 22:14

## 2020-02-24 RX ADMIN — SODIUM CHLORIDE 40 MG: 9 INJECTION, SOLUTION INTRAMUSCULAR; INTRAVENOUS; SUBCUTANEOUS at 09:39

## 2020-02-24 RX ADMIN — HEPARIN SODIUM 5000 UNITS: 5000 INJECTION INTRAVENOUS; SUBCUTANEOUS at 18:29

## 2020-02-24 NOTE — PROGRESS NOTES
Problem: Dysphagia (Adult)  Goal: *Acute Goals and Plan of Care (Insert Text)  Description  Recommendations:  Diet: dental soft solids/ thin liquids, 2 swallows per sip thin  Meds: as tolerated  Aspiration Precautions  Oral Care TID    Goals:  Patient will:  1. Tolerate PO trials with no overt s/s aspiration or distress in 4/5 trials - goal met  2. Utilize compensatory swallow strategies/maneuvers (decrease bite/sip, size/rate, alt. liq/sol) with min cues in 4/5 trials - goal met  3. Complete an objective swallow study (i.e., MBSS) to assess swallow integrity, r/o aspiration, and determine of safest LRD, min A - goal N/A   Outcome: Progressing Towards Goal    SPEECH LANGUAGE PATHOLOGY DYSPHAGIA TREATMENT & DISCHARGE    Patient: Vaishali Rodriguez (35 y.o. male)  Date: 2/24/2020  Diagnosis: Acute respiratory failure (HCC) [J96.00]   Acute respiratory failure (HCC)       Precautions: Aspiration  PLOF: Independent    ASSESSMENT:  Followed up with dysphagia intervention. A&Ox4. Patient observed self-feeding thin liquid + straw and solid trials. Pt demo mildly delayed mastication of solids with otherwise adequate bolus cohesion, manipulation and A-P transit. Further exhibited adequate swallow timing/reflex and hyolaryngeal excursion. Able to manipulate and clear with 0 clinical s/s aspiration. Recommend patient continue soft solid, thin liquid diet. Patient independently verbalized safe swallowing guidelines. Maximum therapeutic gains met; safest, least restrictive diet achieved in current in-patient/acute setting. Accordingly, SLP to d/c intervention at this time. Progression toward goals:  [x]         Improving appropriately - goals met/approximated  []         Not making progress/Not appropriate - will d/c POC     PLAN:  Recommendations and Planned Interventions:  Maximum therapeutic gains met; safest, least restrictive diet achieved. D/C ST intervention at this time.    Discharge Recommendations:  None SUBJECTIVE:   Patient stated I'm still coughing up phlegm. OBJECTIVE:   Cognitive and Communication Status:  Neurologic State: Alert  Orientation Level: Oriented X4  Cognition: Follows commands  Perception: Appears intact  Perseveration: No perseveration noted  Safety/Judgement: Fall prevention  Dysphagia Treatment:  Oral Assessment:  Oral Assessment  Labial: No impairment  Dentition: Natural, Limited  Oral Hygiene: fair  Lingual: No impairment  Velum: No impairment  Mandible: No impairment  P.O. Trials:   Patient Position: HOB 60*   Vocal quality prior to P.O.: Hoarse   Consistency Presented: Thin liquid, Solid   How Presented: Self-fed/presented, Straw, Successive swallows       Bolus Acceptance: No impairment   Bolus Formation/Control: No impairment       Propulsion: No impairment   Oral Residue: None   Initiation of Swallow: No impairment   Laryngeal Elevation: Weak   Aspiration Signs/Symptoms: None   Pharyngeal Phase Characteristics: No impairment, issues, or problems    Effective Modifications: Small sips and bites   Cues for Modifications: None         Oral Phase Severity: No impairment   Pharyngeal Phase Severity : Mild    PAIN:  Pain level pre-treatment: 0/10   Pain level post-treatment: 0/10     After treatment:   []              Patient left in no apparent distress sitting up in chair  [x]              Patient left in no apparent distress in bed  [x]              Call bell left within reach  [x]              Nursing notified  []              Caregiver present  []              Bed alarm activated      COMMUNICATION/EDUCATION:   [x] Aspiration precautions; swallow safety; compensatory techniques  []        Patient unable to participate in education; education ongoing with staff  []  Posted safety precautions in patient's room.   [] Oral-motor/laryngeal strengthening exercises    Thank you for this referral,  FADI Tolliver  Time Calculation: 8 mins

## 2020-02-24 NOTE — ROUTINE PROCESS
Assume care of patient from [place], [place]. Patient received in bed awake. Patient A&Ox4, denies pain and discomfort. No distress noted. Frequently use items within reach. Bed locked in low position. Call bell within reach and patient verbalized understanding of use for assistance and needs. [address]- Patient stated that he has increased SOB, pt coarse with exp. Wheezing, saO2 at 95% on 2L via NC. Called RT for PRN Levalbuterol tx.     0045- Pt verbalized improvement after tx.     0320- Pt stated he has began to feel SOB again, and breathing \"faster\" Patient continues to have thick mucous spit up. Breathing tx to early to give as it is q4hrs. SaO2 continues to be >95%. Called and informed Dr. Haley Morrison. Received orders for Guaifenesin- Codeine 5 ml for cough/secretion expectorant. RBV. Bedside and Verbal shift change report given to Hany Sethi RN (oncoming nurse) by Shawn Galvez RN (offgoing nurse). Report included the following information SBAR, Kardex, Intake/Output, MAR and Recent Results.

## 2020-02-24 NOTE — PROGRESS NOTES
Transition of care: home when medically cleared  Met with patient at bedside. States he nees albuterol rx when he is d/c  Informed him cm has informed Dr. Eleanro Almazan  Met with patient and Dr. Eleanor Almazan at bedside. Patient  Reports he wears home oxygen. Reports he does not want the bipap. Patient lives with his son, wife, daughter and granddaughter. Patient states he uses hoe ox, walker and a cane. PT and ot orders placed for recommendations for safe transition. Cm will continue to follow.    Dr. Ludie Phoenix for pcp  Patient has medicare for insurance  Care Management Interventions  PCP Verified by CM: (had appt with Dr. Ludie Phoenix 2/21/20 but was too ill and called paramedics to bring to [facility])  Palliative Care Criteria Met (RRAT>21 & CHF Dx)?: Yes  Palliative Consult Recommended?: Yes(referral will be placed)  Mode of Transport at Discharge: (TBD)  Transition of Care Consult (CM Consult): Discharge Planning  The Plan for Transition of Care is Related to the Following Treatment Goals : home when medically stable  The Patient and/or Patient Representative was Provided with a Choice of Provider and Agrees with the Discharge Plan?: Yes  Discharge Location  Discharge Placement: Home

## 2020-02-24 NOTE — PROGRESS NOTES
[facility]  Pulmonary, Critical Care, and Sleep Medicine    Pulm Note    Name: Ariana Guzmán MRN: [id number]   : 1945 Hospital: [facility] [place]   Date: 2020  Room: [facility]     Subjective: This patient has been seen and evaluated at the request of Dr. Lisa Lino for COPD exacerbation. Patient is a 66 y.o. male - known to Dr Dorina Unger in the office. He has chronic COPD, home o2 at 2 L, ex-smoker, used to work in the [employer], hx of asbestosis; hx of CKD and parox AF. He came to ER with worsening SOB yesterday. He needed BIPAP and was admitted to the icu. He has frequent admissions for COPD exac.    20   Patient could not use BIPAP last night due to cough  Feels breathing is good but cough persisted; some wheezing  On nc o2; not OOB   Some but improving thick green-yellow sputum. Denies any chest pains or hemoptysis  Reports tolerating modified diet    Review of Systems   General ROS: negative for - fever, chills, weight loss, fatigue and malaise  Cardiovascular ROS: negative for - chest pain, murmur, orthopnea, palpitations or pnd  Gastrointestinal ROS: no nausea, vomiting, abdominal pain  Dermatological ROS: negative for - pruritus, rash or skin lesion changes     SH - stopped smoking 9 mths ago; used to smoke 1.5 ppd    Occup - used to work in [employer] mostly as , and other jobs      Past Medical History:   Diagnosis Date    Brain aneurysm     Cancer ([address]     prostate    COPD (chronic obstructive pulmonary disease) [address]     Hypertension     Nocturia     Pneumonia     Radiation effect       Past Surgical History:   Procedure Laterality Date    HX HERNIA REPAIR        Prior to Admission medications    Medication Sig Start Date End Date Taking? Authorizing Provider   chlorthalidone (HYGROTEN) 25 mg tablet Take 25 mg by mouth daily. Other, MD Blayne   predniSONE (DELTASONE) 50 mg tablet Take 1 Tab by mouth daily for 4 days.  20 Abby Walls MD   acetaminophen (TYLENOL) 325 mg tablet Take 500 mg by mouth every four (4) hours as needed for Pain. Provider, Historical   OXYGEN-AIR DELIVERY SYSTEMS Take 2 L by inhalation continuous. Provider, Historical   dextran 70/hypromellose (ARTIFICIAL TEARS, PF, OP) Apply 2 Drops to eye as needed for Other (both eyes for dryness). Provider, Historical   diphenhydrAMINE (BENADRYL) 25 mg capsule Take 25 mg by mouth nightly as needed for Itching. Provider, Historical   albuterol-ipratropium (DUO-NEB) 2.5 mg-0.5 mg/3 ml nebu 3 mL by Nebulization route every four (4) hours as needed. Patient taking differently: 3 mL by Nebulization route every four (4) hours as needed for Other (Shortness of breath). 2/9/19   [place] Alto Setter, DO   albuterol (PROVENTIL HFA, VENTOLIN HFA, PROAIR HFA) 90 mcg/actuation inhaler Take 2 Puffs by inhalation every four (4) hours as needed for Wheezing or Shortness of Breath. 4/23/18   Surjit Anderson PA-C   ipratropium (ATROVENT) 0.03 % nasal spray 2 Sprays by Both Nostrils route every twelve (12) hours as needed for Rhinitis. Blayne Bran MD   tamsulosin (FLOMAX) 0.4 mg capsule Take 0.4 mg by mouth every evening.     Blayne Bran MD     Allergies   Allergen Reactions    Aspirin Other (comments)     \"messes my stomach up\"      Social History     Tobacco Use    Smoking status: Former Smoker     Types: Cigarettes    Smokeless tobacco: Never Used   Substance Use Topics    Alcohol use: No      Family History   Problem Relation Age of Onset    Heart Disease Mother         Current Facility-Administered Medications   Medication Dose Route Frequency    methylPREDNISolone (PF) (Solu-MEDROL) injection 40 mg  40 mg IntraVENous Q8H    budesonide (PULMICORT) 500 mcg/2 ml nebulizer suspension  500 mcg Nebulization BID RT    dornase alpha (PULMOZYME)2.5mg/2.5ml nebulizer solution  2.5 mg Nebulization BID RT    cefTRIAXone (ROCEPHIN) 1 g in sterile water (preservative free) 10 mL IV syringe  1 g IntraVENous Q24H    sodium chloride (NS) flush 5-40 mL  5-40 mL IntraVENous Q8H    pantoprazole (PROTONIX) 40 mg in 0.9% sodium chloride 10 mL injection  40 mg IntraVENous DAILY    azithromycin (ZITHROMAX) 500 mg in 0.9% sodium chloride 250 mL (VIAL-MATE)  500 mg IntraVENous Q24H    insulin lispro (HUMALOG) injection   SubCUTAneous Q6H    heparin (porcine) injection 5,000 Units  5,000 Units SubCUTAneous Q8H    tamsulosin (FLOMAX) capsule 0.4 mg  0.4 mg Oral DAILY       Objective:   Vital Signs:    Blood pressure 167/78, pulse 75, temperature 97.9 °F (36.6 °C), resp. rate 20, height 5' 8\" (1.727 m), weight 94.9 kg (209 lb 4.8 oz), SpO2 99 %. Body mass index is 31.82 kg/m². O2 Device: Nasal cannula   O2 Flow Rate (L/min): 2 l/min   Temp (24hrs), Av °F (36.7 °C), Min:97.7 °F (36.5 °C), Max:98.3 °F (36.8 °C)       Intake/Output:   Last shift:       07 -  190  In: -   Out: 1000 [Urine:1000]  Last 3 shifts: 1901 -  0700  In: 2630.8 [P.O.:720;  I.V.:1910.8]  Out: 2620 [Urine:2620]    Intake/Output Summary (Last 24 hours) at 2020 1603  Last data filed at 2020 1430  Gross per 24 hour   Intake 490 ml   Output 1410 ml   Net -920 ml         Physical Exam:   Gene: no respiratory distress; on 2 lits nc; AAO x 3  HEENT: pupils not dilated, no scleral jaundice  Neck: No adenopathy or thyroid swelling  CVS: S1S2 no murmurs; JVD not elevated  RS: Mod air entry bilaterally, few fine wheezes bl; some cough +; decreased BS bl base; faint bilateral basal crackles  Abd: protuberant, soft, non tender, no abd distension  Extrm: no leg edema or swelling or clubbing  Skin: no rash      Data review:     Recent Results (from the past 24 hour(s))   GLUCOSE, POC    Collection Time: 20  4:10 PM   Result Value Ref Range    Glucose (POC) 160 (H) 70 - 110 mg/dL   GLUCOSE, POC    Collection Time: 20 12:02 AM   Result Value Ref Range    Glucose (POC) 132 (H) 70 - 110 mg/dL   CBC W/O DIFF    Collection Time: 02/24/20  1:15 AM   Result Value Ref Range    WBC 7.9 4.6 - 13.2 K/uL    RBC 3.39 (L) 4.70 - 5.50 M/uL    HGB 9.7 (L) 13.0 - 16.0 g/dL    HCT 30.3 (L) 36.0 - 48.0 %    MCV 89.4 74.0 - 97.0 FL    MCH 28.6 24.0 - 34.0 PG    MCHC 32.0 31.0 - 37.0 g/dL    RDW 13.0 11.6 - 14.5 %    PLATELET 607 817 - 047 K/uL    MPV 8.8 (L) 9.2 - 20.0 FL   METABOLIC PANEL, COMPREHENSIVE    Collection Time: 02/24/20  1:15 AM   Result Value Ref Range    Sodium 138 136 - 145 mmol/L    Potassium 4.1 3.5 - 5.5 mmol/L    Chloride 104 100 - 111 mmol/L    CO2 27 21 - 32 mmol/L    Anion gap 7 3.0 - 18 mmol/L    Glucose 126 (H) 74 - 99 mg/dL    BUN 31 (H) 7.0 - 18 MG/DL    Creatinine 1.16 0.6 - 1.3 MG/DL    BUN/Creatinine ratio 27 (H) 12 - 20      GFR est AA >60 >60 ml/min/1.73m2    GFR est non-AA >60 >60 ml/min/1.73m2    Calcium 8.1 (L) 8.5 - 10.1 MG/DL    Bilirubin, total 0.2 0.2 - 1.0 MG/DL    ALT (SGPT) 12 (L) 16 - 61 U/L    AST (SGOT) 8 (L) 10 - 38 U/L    Alk.  phosphatase 63 45 - 117 U/L    Protein, total 5.4 (L) 6.4 - 8.2 g/dL    Albumin 2.6 (L) 3.4 - 5.0 g/dL    Globulin 2.8 2.0 - 4.0 g/dL    A-G Ratio 0.9 0.8 - 1.7     GLUCOSE, POC    Collection Time: 02/24/20  6:11 AM   Result Value Ref Range    Glucose (POC) 124 (H) 70 - 110 mg/dL   GLUCOSE, POC    Collection Time: 02/24/20 12:22 PM   Result Value Ref Range    Glucose (POC) 181 (H) 70 - 110 mg/dL           Recent Labs     02/21/20 1829   FIO2I 35   HCO3I 23.8   PCO2I 44.5   PHI 7.337*   PO2I 176*       All Micro Results     Procedure Component Value Units Date/Time    CULTURE, BLOOD [[id number]] Collected:  02/21/20 1820    Order Status:  Completed Specimen:  Blood Updated:  02/24/20 0705     Special Requests: NO SPECIAL REQUESTS        Culture result: NO GROWTH 3 DAYS       CULTURE, BLOOD [[id number]] Collected:  02/21/20 1820    Order Status:  Completed Specimen:  Blood Updated:  02/24/20 0705     Special Requests: NO SPECIAL REQUESTS        Culture result: NO GROWTH 3 DAYS       CULTURE, RESPIRATORY/SPUTUM/BRONCH Mati Danielson [[id number]]     Order Status:  Sent Specimen:  Sputum     INFLUENZA A & B AG (RAPID TEST) [[id number]] Collected:  02/22/20 0000    Order Status:  Completed Specimen:  Nasopharyngeal from Nasal washing Updated:  02/22/20 0831     Influenza A Antigen NEGATIVE         Comment: A negative result does not exclude influenza virus infection, seasonal or H1N1 due to suboptimal sensitivity. If influenza is circulating in your community, a diagnosis of influenza should be considered based on a patients clinical presentation and empiric antiviral treatment should be considered, if indicated. Influenza B Antigen NEGATIVE              ECHO OCT 2019  Interpretation Summary     Result status: Final result   · Left Ventricle: Normal cavity size and systolic function (ejection fraction normal). Mild concentric hypertrophy. Estimated left ventricular ejection fraction is 51 - 55%. Mild (grade 1) left ventricular diastolic dysfunction. · Aortic Valve: Probably trileaflet aortic valve. · IVC/Hepatic Veins: Moderately elevated central venous pressure (10-15 mmHg); IVC diameter is larger than 21 mm and collapses more than 50% with respiration. Imaging:  [x]I have personally reviewed the patients chest radiographs images and report   CXR 2/24  Results from Hospital Encounter encounter on 02/21/20   XR CHEST PORT    Narrative EXAM: XR CHEST PORT    CLINICAL INDICATION/HISTORY: Exacerbation of chronic obstructive pulmonary  disease. Shortness of breath  -Additional: None    COMPARISON: 2/21/2020    TECHNIQUE: Frontal view of the chest    _______________    FINDINGS:    HEART AND MEDIASTINUM: Stable appearing cardiac size and mediastinal contours.     LUNGS AND PLEURAL SPACES: Volume loss in the right hemithorax is again noted,  unchanged from multiple prior examinations along with elliptical appearing right  pleural-based calcification. Mild hazy opacity the right lung base unchanged. No  pneumothorax. No pleural effusion. BONY THORAX AND SOFT TISSUES: No acute osseous abnormality    _______________      Impression IMPRESSION:      1. Overall similar radiographic appearance to multiple prior examinations with  unchanged pleural-based calcifications and right hemithoracic volume loss. No  superimposed acute abnormality. CT 10/31/2019  Results from Hospital Encounter encounter on 10/30/19   CTA CHEST W OR W WO CONT    Narrative EXAM: CTA Chest    INDICATION: Acute on chronic respiratory failure with hypoxia and hypercapnia. Evaluation for potential embolism. COMPARISON: Correlation made with prior CT scan of the chest obtained July 7, 2019. Correlation made with multiple prior radiographs, most recent 10/30/2019    TECHNIQUE: Axial CT imaging from the thoracic inlet through the diaphragm with  intravenous contrast utilizing CTA study for pulmonary artery evaluation. Coronal and sagittal MIP reformations were generated at a separate workstation. One or more dose reduction techniques were used on this CT: automated exposure  control, adjustment of the mAs and/or kVp according to patient size, and  iterative reconstruction techniques. The specific techniques used on this CT  exam have been documented in the patient's electronic medical record. Digital  Imaging and Communications in Medicine (DICOM) format image data are available  to nonaffiliated external healthcare facilities or entities on a secure, media  free, reciprocally searchable basis with patient authorization for at least a  12-month period after this study. _______________    FINDINGS:    EXAM QUALITY: Overall exam quality is adequate. Pulmonary arterial enhancement  is adequate. The breath hold is satisfactory. PULMONARY ARTERIES: No convincing evidence of pulmonary embolism.     MEDIASTINUM: Included thyroid gland contains several small hypodensities which  are too small to require further characterization. Great vessels are of normal  caliber. Thoracic aorta normal in course and caliber. Normal cardiac size. No  pericardial effusion. Small quantity of fluid within the superior pericardial  recess. LYMPH NODES: No enlarged mediastinal or hilar nodes by size criteria. AIRWAY: Central airways are patent. Small quantity of endobronchial secretions  noted at the distal right mainstem bronchus. No discrete foci of endobronchial  mucoid impaction. LUNGS: There are basilar areas of atelectasis which involve the lower lobes  bilaterally. Significantly improved aeration of the left lower lobe noted in the  interval from prior CT scan. No significant groundglass abnormality or septal  line thickening. No alveolar consolidation. PLEURA: As previously, elliptical morphology peripherally calcified structures  are noted within the anterior aspect of the right pleural space with additional  posterior right hemithoracic peripherally calcified pleural space collection  redemonstrated. This appears unchanged from examination of March 19, 2017 and  demonstrates radiographic stability from 2014. No evidence of pneumothorax or  pleural effusion. UPPER ABDOMEN: Small hiatal hernia. Multiple punctate granulomata throughout the  liver. No acute abnormality present. .    OTHER: No acute or aggressive osseous abnormalities identified. _______________      Impression IMPRESSION:    1. No evidence of pulmonary embolism. 2.  Basilar areas of atelectasis without findings to suggest pneumonia,  pulmonary edema, or pleural effusion. 3. 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 4. Small hiatal hernia.          IMPRESSION:   · Acute on chronic resp failure - hypoxemic- J96.21  · COPD exacerbation- J44.1  · Asbestosis- J61  · RT sided pleural effusion (loculated)- J90  · CKD stage 2- N18.2  · P. Afib - I48.0   RECOMMENDATIONS:   · -respirations stable; stable CXR today  · -Start acapella for secretion clearing  · -currently nc o2; titrate flow for o2 sats >91%  · -Unable to tolerate BIPAP last few nights citing cough; will make It PRN  · -Budesonide and pulmozyme nebs bid; avoid LABA nebs due to P. Afib hx  · -prn Levalbuterol nebs  · -IV steroids - solumedrol 40 mg q8 - taper further per clinical course  · -FSBG stable; on SSI  · -Stop Ceftriaxone and azithromycin; MSSA on sputum cxs- start Levofloxacin PO  · Fluids: oral  · Proph:  DVt and GI proph- sc heparin and PPI  · D.w patient and updated management  ·    Will defer respective systems problem management to primary and other consultants and follow patient with primary and other medical team  Further recommendations will be based on the patient's response to recommended treatment and results of the investigation ordered.   · Lines/Tubes: PIVs  ADVANCE DIRECTIVE: Full Code     High complexity decision making was performed in this consultation and evaluation of this patient        Faheem Garcia MD

## 2020-02-24 NOTE — ROUTINE PROCESS
Bedside shift change report given to Kai Glover RN (oncoming nurse) by Carrie Vieyra RN (offgoing nurse). Report included the following information SBAR, Kardex, Intake/Output, MAR and Cardiac Rhythm SR w/PVCs.      Visit Vitals  /79 (BP 1 Location: Right arm, BP Patient Position: At rest;Supine)   Pulse 88   Temp 98.6 °F (37 °C)   Resp 24   Ht 5' 8\" (1.727 m)   Wt 94.9 kg (209 lb 4.8 oz)   SpO2 98%   BMI 31.82 kg/m²     Carrie Vieyra RN

## 2020-02-24 NOTE — PROGRESS NOTES
Hospitalist Progress Note    Patient: Ghislaine Pineda MRN: [id number]  [facility]: [id number]    YOB: 1945  Age: 66 y.o. Sex: male    DOA: 2/21/2020 LOS:  LOS: 3 days                Assessment/Plan     Patient Active Problem List   Diagnosis Code    Hypertension I10    COPD (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 ([facility]) J44.9    Chronic renal disease, stage 3, moderately decreased glomerular filtration rate between 30-59 mL/min/1.73 square meter ([facility]) N18.3    Asbestosis ([facility] J61    Acute exacerbation of chronic obstructive pulmonary disease (COPD) ([facility] J44.1    Advanced care planning/counseling discussion Z71.89    Debility R53.81    Pneumonia of right lower lobe due to methicillin susceptible Staphylococcus aureus (MSSA) ([facility] J15.211    Paroxysmal atrial fibrillation ([facility]) I48.0    Chronic respiratory failure with hypoxia ([facility]) J96.11    COPD (chronic obstructive pulmonary disease) ([facility] J44.9    Acute on chronic respiratory failure with hypoxia and hypercapnia ([facility]) J96.21, J96.22    Atrial fibrillation with RVR ([facility]) I48.91    Acute respiratory failure ([facility]) J96.00    Tobacco dependence F17.200    Leukocytosis D72.829    Hyponatremia E87.1    Acute-on-chronic kidney injury ([facility] N17.9, N18.9    Chronic kidney disease, stage 2 (mild) N18.2    Pleural effusion, right J90        65 y/o male with hx of CKD, COPD, HTN, asbestosis, and PAF admitted for COPD exacerbation and acute respiratory failure requiring BiPAP. He also has hyponatremia likely from diuretic use. Acute on Chronic respiratory failure -   Did not use BiPAP at night. Does not like it.  continue with home oxygen at 2L NC     COPD exacerbation -   on Intravenous steroids. Xopenex. Will not use duo neb due to A-fib  Empiric antibiotics.     He is  on chronic azithromycin. Continue with ceftriaxone and azithromycin     PAF - NSR  Went into a-fib after breathing treatment.  Switched back to NSR    URIEL -  Resolved.     HTN - controlled, monitor BP     DVT prophylaxis with heparin    PT/OT       Disposition : 1-2 days    Review of systems  General: No fevers or chills. Cardiovascular: No chest pain or pressure. No palpitations. Pulmonary: see above   Gastrointestinal: No nausea, vomiting. Physical Exam:  General: Awake, cooperative, no acute distress    HEENT: NC, Atraumatic. PERRLA, anicteric sclerae. Lungs: Exp wheezes bilaterally, decreased breath sounds lower lungs bilaterally. Heart:  S1 S2,  No murmur, No Rubs, No Gallops  Abdomen: Soft, Non distended, Non tender.  +Bowel sounds,   Extremities: No c/c/e  Psych:   Not anxious or agitated. Neurologic:  No acute neurological deficit. Vital signs/Intake and Output:  Visit Vitals  /78   Pulse 75   Temp 97.9 °F (36.6 °C)   Resp 20   Ht 5' 8\" (1.727 m)   Wt 94.9 kg (209 lb 4.8 oz)   SpO2 99%   BMI 31.82 kg/m²     Current Shift:  02/24 0701 - 02/24 1900  In: -   Out: 1000 [Urine:1000]  Last three shifts:  02/22 1901 - 02/24 0700  In: 2630.8 [P.O.:720; I.V.:1910.8]  Out: 2620 [Urine:2620]            Labs: Results:       Chemistry Recent Labs     02/24/20 0115 02/23/20  0135 02/22/20  0440   * 139* 138*    134* 133*   K 4.1 4.1 4.2    101 99*   CO2 27 26 24   BUN 31* 24* 24*   CREA 1.16 1.14 1.12   CA 8.1* 7.9* 8.6   AGAP 7 7 10   BUCR 27* 21* 21*   AP 63 68 81   TP 5.4* 5.5* 6.5   ALB 2.6* 2.7* 2.9*   GLOB 2.8 2.8 3.6   AGRAT 0.9 1.0 0.8      CBC w/Diff Recent Labs     02/24/20  0115 02/23/20  0135 02/22/20  0440 02/21/20  1820   WBC 7.9 10.3 12.5 18.0*   RBC 3.39* 3.27* 3.55* 3.92*   HGB 9.7* 9.2* 9.9* 11.1*   HCT 30.3* 28.4* 30.3* 33.4*    283 290 415   GRANS  --   --   --  90*   LYMPH  --   --   --  3*   EOS  --   --   --  0      Cardiac Enzymes Recent Labs     02/21/20  1820      CKND1 5.5*      Coagulation No results for input(s): PTP, INR, APTT, INREXT, INREXT in the last 72 hours. Lipid Panel No results found for: CHOL, CHOLPOCT, CHOLX, CHLST, CHOLV, 333023, HDL, HDLP, LDL, LDLC, DLDLP, 080398, VLDLC, VLDL, TGLX, TRIGL, TRIGP, TGLPOCT, CHHD, CHHDX   BNP No results for input(s): BNPP in the last 72 hours.    Liver Enzymes Recent Labs     02/24/20  0115   TP 5.4*   ALB 2.6*   AP 63   SGOT 8*      Thyroid Studies Lab Results   Component Value Date/Time    TSH 0.86 10/20/2019 01:05 AM        Procedures/imaging: see electronic medical records for all procedures/Xrays and details which were not copied into this note but were reviewed prior to creation of Plan

## 2020-02-24 NOTE — CDMP QUERY
Patient admitted with Acute on chronic respiratory failure with hypoxia. Noted documentation of \"Basilar areas of atelectasis without findings to suggest pneumonia\" by Santos Gorman MD 02/22/20 1305. Also noted \"Pneumonia of right lower lobe due to methicillin susceptible Staphylococcus aureus\" by Usha Gomez MD on the patient Active Problem List 02/22/20 1626 and 02/23/20 1329 and \"antibiotics were given for community-acquired pneumonia coverage\" and \"Pneumonia likely present at the time of admission\" in ED provider notes' If possible, please document in progress notes and d/c summary if you are evaluating and /or treating any of the following: 
 
 > Pneumonia of right lower lobe due to MSSA ruled out   
 > Pneumonia of right lower lobe due to MSSA confirmed 
 > Community-acquired pneumonia confirmed 
 > Communit-yaquired pneumonia ruled out 
 > Other The medical record reflects the following: 
 
  Risk Factors: Advanced age, smoker, PMH COPD, on CPAP Clinical Indicators: CXR 2/24 Overall similar radiographic appearance to multiple prior examinations with unchanged pleural-based calcifications and right hemithoracic volume loss Treatment: Receiving Chest x-ray, Zithromax, Rocephin Thank you, Rafi Driver, RN, BSN, [address] [place] [address] 
[phone]

## 2020-02-24 NOTE — PROGRESS NOTES
Problem: Falls - Risk of  Goal: *Absence of Falls  Description  Document [facility] Fall Risk and appropriate interventions in the flowsheet.   Outcome: Progressing Towards Goal  Note: Fall Risk Interventions:  Mobility Interventions: Bed/chair exit alarm, Communicate number of staff needed for ambulation/transfer, Patient to call before getting OOB, Utilize walker, cane, or other assistive device         Medication Interventions: Bed/chair exit alarm, Patient to call before getting OOB, Teach patient to arise slowly    Elimination Interventions: Bed/chair exit alarm, Call light in reach, Patient to call for help with toileting needs, Stay With Me (per policy), Toilet paper/wipes in reach, Toileting schedule/hourly rounds              Problem: Patient Education: Go to Patient Education Activity  Goal: Patient/Family Education  Outcome: Progressing Towards Goal     Problem: Patient Education: Go to Patient Education Activity  Goal: Patient/Family Education  Outcome: Progressing Towards Goal     Problem: Chronic Renal Failure  Goal: *Fluid and electrolytes stabilized  Outcome: Progressing Towards Goal     Problem: Chronic Obstructive Pulmonary Disease (COPD)  Goal: *Oxygen saturation during activity within specified parameters  Outcome: Progressing Towards Goal  Goal: *Able to remain out of bed as prescribed  Outcome: Progressing Towards Goal  Goal: *Absence of hypoxia  Outcome: Progressing Towards Goal  Goal: *Optimize nutritional status  Outcome: Progressing Towards Goal     Problem: Gas Exchange - Impaired  Goal: *Absence of hypoxia  Outcome: Progressing Towards Goal     Problem: Patient Education: Go to Patient Education Activity  Goal: Patient/Family Education  Outcome: Progressing Towards Goal     Problem: Pain  Goal: *Control of Pain  Outcome: Progressing Towards Goal  Goal: *PALLIATIVE CARE:  Alleviation of Pain  Outcome: Progressing Towards Goal     Problem: Patient Education: Go to Patient Education Activity  Goal: Patient/Family Education  Outcome: Progressing Towards Goal     Problem: Pressure Injury - Risk of  Goal: *Prevention of pressure injury  Description  Document Nikos Scale and appropriate interventions in the flowsheet. Outcome: Progressing Towards Goal  Note: Pressure Injury Interventions:             Activity Interventions: Increase time out of bed, Pressure redistribution bed/mattress(bed type), PT/OT evaluation    Mobility Interventions: HOB 30 degrees or less, Pressure redistribution bed/mattress (bed type), PT/OT evaluation    Nutrition Interventions: Document food/fluid/supplement intake, Offer support with meals,snacks and hydration                     Problem: Patient Education: Go to Patient Education Activity  Goal: Patient/Family Education  Outcome: Progressing Towards Goal

## 2020-02-25 ENCOUNTER — HOME HEALTH ADMISSION (OUTPATIENT)
Dept: HOME HEALTH SERVICES | Facility: HOME HEALTH | Age: 75
End: 2020-02-25

## 2020-02-25 VITALS
WEIGHT: 212 LBS | HEART RATE: 88 BPM | HEIGHT: 68 IN | BODY MASS INDEX: 32.13 KG/M2 | SYSTOLIC BLOOD PRESSURE: 160 MMHG | OXYGEN SATURATION: 97 % | TEMPERATURE: 98.2 F | RESPIRATION RATE: 20 BRPM | DIASTOLIC BLOOD PRESSURE: 73 MMHG

## 2020-02-25 PROBLEM — J96.22 ACUTE ON CHRONIC RESPIRATORY FAILURE WITH HYPOXIA AND HYPERCAPNIA (HCC): Status: RESOLVED | Noted: 2019-10-30 | Resolved: 2020-02-25

## 2020-02-25 PROBLEM — E87.1 HYPONATREMIA: Status: RESOLVED | Noted: 2020-02-21 | Resolved: 2020-02-25

## 2020-02-25 PROBLEM — J96.21 ACUTE ON CHRONIC RESPIRATORY FAILURE WITH HYPOXIA AND HYPERCAPNIA (HCC): Status: RESOLVED | Noted: 2019-10-30 | Resolved: 2020-02-25

## 2020-02-25 LAB
GLUCOSE BLD STRIP.AUTO-MCNC: 133 MG/DL (ref 70–110)
GLUCOSE BLD STRIP.AUTO-MCNC: 136 MG/DL (ref 70–110)

## 2020-02-25 PROCEDURE — 82962 GLUCOSE BLOOD TEST: CPT

## 2020-02-25 PROCEDURE — 77010033678 HC OXYGEN DAILY

## 2020-02-25 PROCEDURE — 74011250636 HC RX REV CODE- 250/636: Performed by: INTERNAL MEDICINE

## 2020-02-25 PROCEDURE — 97161 PT EVAL LOW COMPLEX 20 MIN: CPT

## 2020-02-25 PROCEDURE — 74011000250 HC RX REV CODE- 250: Performed by: FAMILY MEDICINE

## 2020-02-25 PROCEDURE — 97535 SELF CARE MNGMENT TRAINING: CPT

## 2020-02-25 PROCEDURE — C9113 INJ PANTOPRAZOLE SODIUM, VIA: HCPCS | Performed by: FAMILY MEDICINE

## 2020-02-25 PROCEDURE — 94640 AIRWAY INHALATION TREATMENT: CPT

## 2020-02-25 PROCEDURE — 74011250637 HC RX REV CODE- 250/637: Performed by: FAMILY MEDICINE

## 2020-02-25 PROCEDURE — 74011250636 HC RX REV CODE- 250/636: Performed by: FAMILY MEDICINE

## 2020-02-25 PROCEDURE — 74011000250 HC RX REV CODE- 250: Performed by: INTERNAL MEDICINE

## 2020-02-25 PROCEDURE — 97167 OT EVAL HIGH COMPLEX 60 MIN: CPT

## 2020-02-25 PROCEDURE — 74011000250 HC RX REV CODE- 250: Performed by: HOSPITALIST

## 2020-02-25 RX ORDER — PREDNISONE 10 MG/1
TABLET ORAL
Qty: 21 TAB | Refills: 0 | Status: ON HOLD | OUTPATIENT
Start: 2020-02-25 | End: 2020-04-14 | Stop reason: SDUPTHER

## 2020-02-25 RX ADMIN — TAMSULOSIN HYDROCHLORIDE 0.4 MG: 0.4 CAPSULE ORAL at 08:43

## 2020-02-25 RX ADMIN — SODIUM CHLORIDE 40 MG: 9 INJECTION, SOLUTION INTRAMUSCULAR; INTRAVENOUS; SUBCUTANEOUS at 08:44

## 2020-02-25 RX ADMIN — HEPARIN SODIUM 5000 UNITS: 5000 INJECTION INTRAVENOUS; SUBCUTANEOUS at 08:44

## 2020-02-25 RX ADMIN — METHYLPREDNISOLONE SODIUM SUCCINATE 40 MG: 40 INJECTION, POWDER, FOR SOLUTION INTRAMUSCULAR; INTRAVENOUS at 06:48

## 2020-02-25 RX ADMIN — DORNASE ALFA 2.5 MG: 1 SOLUTION RESPIRATORY (INHALATION) at 07:23

## 2020-02-25 RX ADMIN — BUDESONIDE 500 MCG: 0.5 INHALANT RESPIRATORY (INHALATION) at 07:23

## 2020-02-25 RX ADMIN — LEVALBUTEROL 1.25 MG: 1.25 SOLUTION, CONCENTRATE RESPIRATORY (INHALATION) at 05:39

## 2020-02-25 RX ADMIN — Medication 10 ML: at 06:48

## 2020-02-25 RX ADMIN — Medication 10 ML: at 15:28

## 2020-02-25 RX ADMIN — Medication 10 ML: at 00:46

## 2020-02-25 RX ADMIN — HEPARIN SODIUM 5000 UNITS: 5000 INJECTION INTRAVENOUS; SUBCUTANEOUS at 00:46

## 2020-02-25 RX ADMIN — LEVALBUTEROL 1.25 MG: 1.25 SOLUTION, CONCENTRATE RESPIRATORY (INHALATION) at 11:58

## 2020-02-25 RX ADMIN — METHYLPREDNISOLONE SODIUM SUCCINATE 40 MG: 40 INJECTION, POWDER, FOR SOLUTION INTRAMUSCULAR; INTRAVENOUS at 15:28

## 2020-02-25 NOTE — PROGRESS NOTES
Shift Summary:  Patient lung sounds coarse and diminished bilaterally. O2 sats % on 2 liters NC.  BP elevated overnight 167/87 with HR 76.  Patient denied pain, but required nebulizer treatment early this a.m for dyspnea. POC glucose 133.

## 2020-02-25 NOTE — PROGRESS NOTES
5223  Assumed care of patient at this time, assessment complete. Patient alert and oriented x4. Denies SOB and chest pain. Patient lungs coarse bilaterally. Cap refilled  less than 3 seconds. Patient denies numbness and tingling to all extremities. Stated pain 0/10. Patient has 20G IV to left forearm and 18G IV to right AC. Pateint refused SCDs. Call light and personal items in reach, bed in low position and locked, will continue to monitor patient. Fall risk arm band in place. 1000  Physical therapist is working with patient in room at this time. 1130  Patient had uneventful shift. No signs or symptoms of distress. Patient ambulating and voiding sufficient amounts. Plan for patient to d/c home. 1145  Bedside in verbal shift change report given to [address] [place] [address],[address]  (oncoming nurse) by Joselo Mariano RN (off going nurse). Report included the following information: SBAR, KARDEX, MAR and recent results.

## 2020-02-25 NOTE — HOME CARE
Met with patient in room. Agrees to 1 time nurse visit. Verified demographics and explained H2H. Order noted. Will arrange.      Dayna Benitez RN, BSN  [employer]

## 2020-02-25 NOTE — PROGRESS NOTES
Transition of care: home with family and [facility]  Met with patient and Dr. Luc Street at bedside. Patient lives with his wife. Patient has declined the bipap   Patient reports that he has home oxygen and his wife will pick him up  He has Dr. Beverley Leary for pcp  He has agreed to [facility] for follow up due to copd. Patient has medicare for insurance. Patient reports he is IADL's.  He declines rehab and HHC but will accept [facility]  Address verified

## 2020-02-25 NOTE — PROGRESS NOTES
franki: Self Care Deficits Care Plan (Adult)  Goal: *Acute Goals and Plan of Care (Insert Text)  Description  Initial Occupational Therapy Goals (2/25/2020) Within 7 day(s):    1. Patient will perform grooming seated EOB with setup/Supervision x 10 minutes for increased independence with ADLs. 2. Patient will perform UB dressing with setup for increased independence with ADLs. 3. Patient will perform LB dressing with setup/Catina & A/E PRN for increased independence with ADLs. 4. Patient will perform all aspects of toileting with Supervision for increased independence in ADLs  5. Patient will independently apply energy conservation techniques with 1 verbal cue(s) for increased independence with ADLs. 6. Patient will utilize good body mechanics during ADLs with 1 verbal cue(s). 7. Patient will independently manage O2 tubing to safely ambulate to/from bathroom. Outcome: Resolved/Met     OCCUPATIONAL THERAPY EVALUATION/DISCHARGE    Patient: Rebecca Cruz (11 y.o. male)  Date: 2/25/2020  Primary Diagnosis: Acute respiratory failure (HCC) [J96.00]        Precautions:   Fall(O2)  PLOF: Patient reports grossly mod I; pt well-known to this OT and requiring assist w/ socks/shoes; pt was getting out, but states his breathing is so bad he gets SOB walking from one room to the next    ASSESSMENT AND RECOMMENDATIONS:  Based on the objective data described below, the patient presents with significant functional decline d/t respiratory status. Pt reports ability to breathe at baseline now, but still limited. Pt setup for dressing except socks/shoes. Encouraged pt to sit during all ADLs to assist w/ Energy Conservation/Work Simplification Techniques. Pt is used to doing things and struggling w/ coping and compensating with respiratory deficits. Would recommend Palliative Care Consult for goals and quality of life.  This pt well-known to this OT and see a significant decline in breathing from baseline 1 year ago.    Education: Patient instructed on home safety, body mechanics for optimal respiratory effort, Energy Conservation/Work Simplification Techniques, adaptive strategies and adaptive dressing techniques including clothing modifications with patient verbalizing understanding at this time. Skilled Occupational Therapy is not indicated at this time. Discharge Recommendations: Home Health  Further Equipment Recommendations for Discharge: To Be Determined (TBD) at next level of care      SUBJECTIVE:   Patient stated I get winded just walking to the next room.     OBJECTIVE DATA SUMMARY:     Past Medical History:   Diagnosis Date    Brain aneurysm     Cancer ([facility] [address]     prostate    COPD (chronic obstructive pulmonary disease) (HCC)     Hypertension     Nocturia     Pneumonia     Radiation effect      Past Surgical History:   Procedure Laterality Date    HX HERNIA REPAIR       Barriers to Learning/Limitations: yes;  sensory deficits-vision/hearing/speech and financial  Compensate with: visual, verbal, tactile, kinesthetic cues/model    Home Situation: supportive family  Home Situation  Home Environment: Trailer/mobile home  # Steps to Enter: 6  Rails to Enter: Yes  Hand Rails : Bilateral  One/Two Story Residence: One story  Living Alone: No  Support Systems: Child(prashant), Spouse/Significant Other/Partner  Patient Expects to be Discharged to[de-identified] Trailer/mobile home  Current DME Used/Available at Home: Cane, quad, Walker, rolling  Tub or Shower Type: Shower  [x]     Right hand dominant   []     Left hand dominant    Cognitive/Behavioral Status:  Neurologic State: Alert  Orientation Level: Oriented X4  Cognition: Follows commands; Appropriate decision making; Appropriate for age attention/concentration  Safety/Judgement: Awareness of environment; Fall prevention; Insight into deficits; Decreased awareness of need for assistance    Skin: appears grossly intact   Edema: No significant edema noted     Vision/Perceptual: Appears grossly WFL (reads CC on TV d/t limited hearing)     Coordination: BUE  Coordination: Within functional limits  Fine Motor Skills-Upper: Left Intact; Right Intact    Gross Motor Skills-Upper: Left Intact; Right Intact    Balance:  Sitting: Intact  Standing: Intact; With support    Strength: BUE  Strength: Generally decreased, functional    Tone & Sensation: BUE  Tone: Normal  Sensation: Intact    Range of Motion: BUE  AROM: Generally decreased, functional    Functional Mobility and Transfers for ADLs:  Bed Mobility:  Supine to Sit: Supervision;Stand-by assistance  Scooting: Supervision  Transfers:  Sit to Stand: Supervision; Adaptive equipment  Bed to Chair: Supervision; Adaptive equipment    ADL Assessment:  Feeding: Independent    Oral Facial Hygiene/Grooming: Setup    Bathing: Setup; Additional time    Upper Body Dressing: Setup    Lower Body Dressing: Setup;Minimum assistance; Additional time    Toileting: Supervision    ADL Intervention:  Feeding  Drink to Mouth: Independent    Upper Body Dressing Assistance  Pullover Shirt: Set-up    Lower Body Dressing Assistance  Underpants: Set-up  Pants With Elastic Waist: Set-up  Position Performed: Seated edge of bed    Cognitive Retraining  Problem Solving: Inductive reason; Identifying the task; Identifying the problem;General alternative solution;Deductive reason; Awareness of environment  Executive Functions: Executing cognitive plans;Managing time  Organizing/Sequencing: Breaking task down;Prioritizing  Safety/Judgement: Awareness of environment; Fall prevention; Insight into deficits; Decreased awareness of need for assistance    Pain:  Pain level pre-treatment: 0/10   Pain level post-treatment: 0/10   Pain Intervention(s): Medication provided by Nursing (see MAR); Rest, Repositioning   Response to intervention: Nurse notified, See doc flow sheet    Activity Tolerance:   Fair-/Poor+. Patient able to stand <1 minute(s).  Patient able to complete ADLs with constant rest breaks. Patient limited by functional endurance/respiratory status. Patient unsteady. Please refer to the flowsheet for vital signs taken during this treatment. After treatment:   [x]  Patient left in no apparent distress sitting up in chair  []  Patient left in no apparent distress in bed  [x]  Call bell left within reach  [x]  Nursing notified  []  Caregiver present  []  Bed alarm activated    COMMUNICATION/EDUCATION:   [x]      Role of Occupational Therapy in the acute care setting  [x]      Home safety education was provided and the patient/caregiver indicated understanding. [x]      Patient/family have participated as able and agree with findings and recommendations. []      Patient is unable to participate in plan of care at this time. Thank you for this referral.  Yayo Oliver  Time Calculation: 23 mins      Eval Complexity: History: HIGH Complexity : Extensive review of history including physical, cognitive and psychosocial history ; Examination: HIGH Complexity : 5 or more performance deficits relating to physical, cognitive , or psychosocial skils that result in activity limitations and / or participation restrictions; Decision Making:HIGH Complexity : Patient presents with comorbidities that affect occupational performance.  Signifigant modification of tasks or assistance (eg, physical or verbal) with assessment (s) is necessary to enable patient to complete evaluation

## 2020-02-25 NOTE — ROUTINE PROCESS
Bedside and Verbal shift change report given to L. Marylen Kempf (oncoming nurse) by Kamlesh Wooten (offgoing nurse). Report included the following information SBAR, Kardex, Intake/Output and MAR.

## 2020-02-25 NOTE — DISCHARGE INSTRUCTIONS
Patient Education        Learning About Asbestosis  What is asbestosis? Asbestosis is a long-term (chronic) lung disease that can get worse over time. It is caused by breathing tiny asbestos fibers into the lungs. The fibers scar the inside of the lungs, which makes it hard to breathe. Many years may pass between the time you are exposed to the fibers and when you feel the effects of the disease. The amount of time can depend on how long the exposure was and how many fibers you breathed in. Smoking makes the disease get worse faster and increases the chances of it leading to lung cancer. What causes asbestosis? Asbestos is a material that was commonly used for insulating and soundproofing older homes and buildings. It was also used as a material in manufacturing. Many people have been exposed to asbestos while working in mining, construction, and the [employer], and by removing asbestos from old buildings. Asbestos can crumble and send fine fibers into the air. When you breathe in the fibers, they can irritate your lungs. Over time, your lungs get scarred, which makes the lungs less flexible for breathing. What are the symptoms? Symptoms may include:  · A dry cough that doesn't go away. · Shortness of breath that gets worse when you exercise or exert yourself. · Loss of appetite. · Weight loss. · Pain in the chest when you take a deep breath. How is asbestosis diagnosed? Tests to diagnose asbestosis include:  · A detailed medical history to identify any exposure you have had to asbestos. Your doctor may ask questions about your past homes, workplaces, jobs, and hobbies. · Lung function tests. These tests measure how much air you can breathe into your lungs and how quickly you can breathe it out. They also measure how much oxygen enters your bloodstream through the lungs. · X-rays or a CT scan of your lungs. · Examination of sputum (mucus) under a microscope. · A lung biopsy.  In this test, a small amount of tissue is removed from the lung and looked at under a microscope. How is it treated? Asbestosis can't be cured, but it can be treated. The treatment is to help you feel better and keep the disease from getting worse. Your doctor may:  · Give you oxygen to help you breathe easier. · Give you medicine to loosen thick mucus in your lungs. Caring for yourself at home  You can take these steps to help yourself:  · Don't smoke, and don't allow smoking in your house or car. If people who smoke live in or visit your home, ask them to smoke outside. · Get vaccinated against flu and pneumonia. · Avoid any more exposure to asbestos in your home or workplace. Follow-up care is a key part of your treatment and safety. Be sure to make and go to all appointments, and call your doctor if you are having problems. It's also a good idea to know your test results and keep a list of the medicines you take. Where can you learn more? Go to http://ifeoma-jana.info/. Enter L883 in the search box to learn more about \"Learning About Asbestosis. \"  Current as of: June 9, 2019  Content Version: 12.2  © 9398-4482 [employer]. Care instructions adapted under license by [employer] (which disclaims liability or warranty for this information). If you have questions about a medical condition or this instruction, always ask your healthcare professional. [place] [address] any warranty or liability for your use of this information. Patient Education        Chronic Obstructive Pulmonary Disease (COPD): Care Instructions  Your Care Instructions    Chronic obstructive pulmonary disease (COPD) is a general term for a group of lung diseases, including emphysema and chronic bronchitis. People with COPD have decreased airflow in and out of the lungs, which makes it hard to breathe. The airways also can get clogged with thick mucus.  Cigarette smoking is a major cause of COPD.  Although there is no cure for COPD, you can slow its progress. Following your treatment plan and taking care of yourself can help you feel better and live longer. Follow-up care is a key part of your treatment and safety. Be sure to make and go to all appointments, and call your doctor if you are having problems. It's also a good idea to know your test results and keep a list of the medicines you take. How can you care for yourself at home?   Staying healthy    · Do not smoke. This is the most important step you can take to prevent more damage to your lungs. If you need help quitting, talk to your doctor about stop-smoking programs and medicines. These can increase your chances of quitting for good.     · Avoid colds and flu. Get a pneumococcal vaccine shot. If you have had one before, ask your doctor whether you need a second dose. Get the flu vaccine every fall. If you must be around people with colds or the flu, wash your hands often.     · Avoid secondhand smoke, air pollution, and high altitudes. Also avoid cold, dry air and hot, humid air. Stay at home with your windows closed when air pollution is bad.    Medicines and oxygen therapy    · Take your medicines exactly as prescribed. Call your doctor if you think you are having a problem with your medicine.     · You may be taking medicines such as:  ? Bronchodilators. These help open your airways and make breathing easier. Bronchodilators are either short-acting (work for 6 to 9 hours) or long-acting (work for 24 hours). You inhale most bronchodilators, so they start to act quickly. Always carry your quick-relief inhaler with you in case you need it while you are away from home. ? Corticosteroids (prednisone, budesonide). These reduce airway inflammation. They come in pill or inhaled form. You must take these medicines every day for them to work well.     · A spacer may help you get more inhaled medicine to your lungs.  Ask your doctor or pharmacist if a spacer is right for you. If it is, ask how to use it properly.     · Do not take any vitamins, over-the-counter medicine, or herbal products without talking to your doctor first.     · If your doctor prescribed antibiotics, take them as directed. Do not stop taking them just because you feel better. You need to take the full course of antibiotics.     · Oxygen therapy boosts the amount of oxygen in your blood and helps you breathe easier. Use the flow rate your doctor has recommended, and do not change it without talking to your doctor first.   Activity    · Get regular exercise. Walking is an easy way to get exercise. Start out slowly, and walk a little more each day.     · Pay attention to your breathing. You are exercising too hard if you cannot talk while you are exercising.     · Take short rest breaks when doing household chores and other activities.     · Learn breathing methods--such as breathing through pursed lips--to help you become less short of breath.     · If your doctor has not set you up with a pulmonary rehabilitation program, talk to him or her about whether rehab is right for you. Rehab includes exercise programs, education about your disease and how to manage it, help with diet and other changes, and emotional support. Diet    · Eat regular, healthy meals. Use bronchodilators about 1 hour before you eat to make it easier to eat. Eat several small meals instead of three large ones. Drink beverages at the end of the meal. Avoid foods that are hard to chew.     · Eat foods that contain protein so that you do not lose muscle mass.     · Talk with your doctor if you gain too much weight or if you lose weight without trying.    Mental health    · Talk to your family, friends, or a therapist about your feelings. It is normal to feel frightened, angry, hopeless, helpless, and even guilty. Talking openly about bad feelings can help you cope. If these feelings last, talk to your doctor.    When should you call for help? Call 911 anytime you think you may need emergency care. For example, call if:    · You have severe trouble breathing.    Call your doctor now or seek immediate medical care if:    · You have new or worse trouble breathing.     · You cough up blood.     · You have a fever.    Watch closely for changes in your health, and be sure to contact your doctor if:    · You cough more deeply or more often, especially if you notice more mucus or a change in the color of your mucus.     · You have new or worse swelling in your legs or belly.     · You are not getting better as expected. Where can you learn more? Go to http://ifeoma-jana.info/. Cintia Fish in the search box to learn more about \"Chronic Obstructive Pulmonary Disease (COPD): Care Instructions. \"  Current as of: June 9, 2019  Content Version: 12.2  © 8604-1950 [employer]. Care instructions adapted under license by [employer] (which disclaims liability or warranty for this information). If you have questions about a medical condition or this instruction, always ask your healthcare professional. [address] any warranty or liability for your use of this information. Patient Education        Low Sodium Diet (2,000 Milligram): Care Instructions  Your Care Instructions    Too much sodium causes your body to hold on to extra water. This can raise your blood pressure and force your heart and kidneys to work harder. In very serious cases, this could cause you to be put in the hospital. It might even be life-threatening. By limiting sodium, you will feel better and lower your risk of serious problems. The most common source of sodium is salt. People get most of the salt in their diet from canned, prepared, and packaged foods. Fast food and restaurant meals also are very high in sodium. Your doctor will probably limit your sodium to less than 2,000 milligrams (mg) a day.  This limit counts all the sodium in prepared and packaged foods and any salt you add to your food. Follow-up care is a key part of your treatment and safety. Be sure to make and go to all appointments, and call your doctor if you are having problems. It's also a good idea to know your test results and keep a list of the medicines you take. How can you care for yourself at home? Read food labels  · Read labels on cans and food packages. The labels tell you how much sodium is in each serving. Make sure that you look at the serving size. If you eat more than the serving size, you have eaten more sodium. · Food labels also tell you the Percent Daily Value for sodium. Choose products with low Percent Daily Values for sodium. · Be aware that sodium can come in forms other than salt, including monosodium glutamate (MSG), sodium citrate, and sodium bicarbonate (baking soda). MSG is often added to Asian food. When you eat out, you can sometimes ask for food without MSG or added salt. Buy low-sodium foods  · Buy foods that are labeled \"unsalted\" (no salt added), \"sodium-free\" (less than 5 mg of sodium per serving), or \"low-sodium\" (less than 140 mg of sodium per serving). Foods labeled \"reduced-sodium\" and \"light sodium\" may still have too much sodium. Be sure to read the label to see how much sodium you are getting. · Buy fresh vegetables, or frozen vegetables without added sauces. Buy low-sodium versions of canned vegetables, soups, and other canned goods. Prepare low-sodium meals  · Cut back on the amount of salt you use in cooking. This will help you adjust to the taste. Do not add salt after cooking. One teaspoon of salt has about 2,300 mg of sodium. · Take the salt shaker off the table. · Flavor your food with garlic, lemon juice, onion, vinegar, herbs, and spices. Do not use soy sauce, lite soy sauce, steak sauce, onion salt, garlic salt, celery salt, mustard, or ketchup on your food.   · Use low-sodium salad dressings, sauces, and ketchup. Or make your own salad dressings and sauces without adding salt. · Use less salt (or none) when recipes call for it. You can often use half the salt a recipe calls for without losing flavor. Other foods such as rice, pasta, and grains do not need added salt. · Rinse canned vegetables, and cook them in fresh water. This removes some--but not all--of the salt. · Avoid water that is naturally high in sodium or that has been treated with water softeners, which add sodium. Call your local water company to find out the sodium content of your water supply. If you buy bottled water, read the label and choose a sodium-free brand. Avoid high-sodium foods  · Avoid eating:  ? Smoked, cured, salted, and canned meat, fish, and poultry. ? Ham, elias, hot dogs, and luncheon meats. ? Regular, hard, and processed cheese and regular peanut butter. ? Crackers with salted tops, and other salted snack foods such as pretzels, chips, and salted popcorn. ? Frozen prepared meals, unless labeled low-sodium. ? Canned and dried soups, broths, and bouillon, unless labeled sodium-free or low-sodium. ? Canned vegetables, unless labeled sodium-free or low-sodium. ? Gabriela Linear fries, pizza, tacos, and other fast foods. ? Pickles, olives, ketchup, and other condiments, especially soy sauce, unless labeled sodium-free or low-sodium. Where can you learn more? Go to http://ifeoma-jana.info/. Enter U494 in the search box to learn more about \"Low Sodium Diet (2,000 Milligram): Care Instructions. \"  Current as of: November 7, 2018  Content Version: 12.2  © 6551-9089 [employer]. Care instructions adapted under license by [employer] (which disclaims liability or warranty for this information).  If you have questions about a medical condition or this instruction, always ask your healthcare professional. [place] [address] any warranty or liability for your use of this information.

## 2020-02-25 NOTE — ROUTINE PROCESS
Bedside and Verbal shift change report given to Leila Cuevas (oncoming nurse) by Tomy Washburn (offgoing nurse). Report included the following information SBAR, Kardex and MAR.

## 2020-02-25 NOTE — ROUTINE PROCESS
1200: received bedside shift report from Zuleyka Paulino RN (offgoing nurse) and assumed care of the pt. Updated white board, assessed all current needs, left bed in lowest position with wheels locked and call light within reach. 1530: reviewed discharge instructions with pt, answered all questions, removed tele monitor, removed IVs and removed and shredded armbands. Shift summary, shift uneventful, no complaints of chest pain or shortness of breath. Pt to be transported home by his wife.      Yohan Wilder RN

## 2020-02-25 NOTE — PROGRESS NOTES
Problem: Falls - Risk of  Goal: *Absence of Falls  Description  Document Tonio Zavala Fall Risk and appropriate interventions in the flowsheet. Outcome: Progressing Towards Goal  Note: Fall Risk Interventions:  Mobility Interventions: Communicate number of staff needed for ambulation/transfer         Medication Interventions: Evaluate medications/consider consulting pharmacy    Elimination Interventions: Call light in reach              Problem: Patient Education: Go to Patient Education Activity  Goal: Patient/Family Education  Outcome: Progressing Towards Goal     Problem: Patient Education: Go to Patient Education Activity  Goal: Patient/Family Education  Outcome: Progressing Towards Goal     Problem: Chronic Renal Failure  Goal: *Fluid and electrolytes stabilized  Outcome: Progressing Towards Goal     Problem: Chronic Obstructive Pulmonary Disease (COPD)  Goal: *Oxygen saturation during activity within specified parameters  Outcome: Progressing Towards Goal  Goal: *Able to remain out of bed as prescribed  Outcome: Progressing Towards Goal  Goal: *Absence of hypoxia  Outcome: Progressing Towards Goal  Goal: *Optimize nutritional status  Outcome: Progressing Towards Goal     Problem: Gas Exchange - Impaired  Goal: *Absence of hypoxia  Outcome: Progressing Towards Goal     Problem: Patient Education: Go to Patient Education Activity  Goal: Patient/Family Education  Outcome: Progressing Towards Goal     Problem: Pain  Goal: *Control of Pain  Outcome: Progressing Towards Goal  Goal: *PALLIATIVE CARE:  Alleviation of Pain  Outcome: Progressing Towards Goal     Problem: Patient Education: Go to Patient Education Activity  Goal: Patient/Family Education  Outcome: Progressing Towards Goal     Problem: Pressure Injury - Risk of  Goal: *Prevention of pressure injury  Description  Document Nikos Scale and appropriate interventions in the flowsheet.   Outcome: Progressing Towards Goal  Note: Pressure Injury Interventions: Activity Interventions: Increase time out of bed    Mobility Interventions: HOB 30 degrees or less    Nutrition Interventions: Document food/fluid/supplement intake                     Problem: Patient Education: Go to Patient Education Activity  Goal: Patient/Family Education  Outcome: Progressing Towards Goal

## 2020-02-25 NOTE — DISCHARGE SUMMARY
Discharge Summary    Patient: Stephen Wagner MRN: [id number]  CSN: [id number]    YOB: 1945  Age: 66 y.o.   Sex: male    DOA: 2/21/2020 LOS:  LOS: 4 days   Discharge Date: 2/25/2020     Primary Care Provider:  Juancho Vital MD    Admission Diagnoses: Acute respiratory failure [facility]) [J96.00]    Discharge Diagnoses:    Problem List as of 2/25/2020 Date Reviewed: 7/6/2019          Codes Class Noted - Resolved    Pleural effusion, right ICD-10-CM: J90  ICD-9-CM: 511.9  2/22/2020 - Present        Tobacco dependence ICD-10-CM: F17.200  ICD-9-CM: 305.1  2/21/2020 - Present        Paroxysmal atrial fibrillation ([facility] ICD-10-CM: I48.0  ICD-9-CM: 427.31  8/19/2019 - Present        Chronic respiratory failure with hypoxia ([facility] ICD-10-CM: J96.11  ICD-9-CM: 518.83, 799.02  8/19/2019 - Present        Debility ICD-10-CM: R53.81  ICD-9-CM: 799.3  7/8/2019 - Present        Acute exacerbation of chronic obstructive pulmonary disease (COPD) ([facility] ICD-10-CM: J44.1  ICD-9-CM: 491.21  2/8/2019 - Present        Asbestosis ([facility] ICD-10-CM: T81  ICD-9-CM: 612  10/19/2018 - Present        Chronic renal disease, stage 3, moderately decreased glomerular filtration rate between 30-59 mL/min/1.73 square meter ([facility] ICD-10-CM: N18.3  ICD-9-CM: 585.3  7/20/2018 - Present        COPD (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 ([facility] ICD-10-CM: J44.9  ICD-9-CM: 491.20  4/20/2018 - Present        Hypertension ICD-10-CM: I10  ICD-9-CM: 401.9  Unknown - Present        RESOLVED: Hyponatremia ICD-10-CM: E87.1  ICD-9-CM: 276.1  2/21/2020 - 2/25/2020        RESOLVED: Acute on chronic respiratory failure with hypoxia and hypercapnia ([facility] ICD-10-CM: J96.21, J96.22  ICD-9-CM: 518.84, 786.09, 799.02  10/30/2019 - 2/25/2020        RESOLVED: COPD with asthma and status asthmaticus ([facility] ICD-10-CM: J44.9, J45.902  ICD-9-CM: 493.21  7/20/2018 - 2/8/2019        RESOLVED: Status asthmaticus with COPD (chronic obstructive pulmonary disease) ([facility] ICD-10-CM: J44.9, J45.902  ICD-9-CM: 493.21  4/20/2018 - 2/8/2019        RESOLVED: PVC's (premature ventricular contractions) ICD-10-CM: I49.3  ICD-9-CM: 427.69  4/20/2018 - 2/8/2019        RESOLVED: Acute respiratory failure ([facility] ICD-10-CM: J96.00  ICD-9-CM: 518.81  10/2/2014 - 3/18/2017        RESOLVED: URIEL (acute kidney injury) ([facility] ICD-10-CM: N17.9  ICD-9-CM: 584.9  10/2/2014 - 2/8/2019        RESOLVED: Pneumonia ICD-10-CM: J18.9  ICD-9-CM: 486  10/2/2014 - 3/18/2017              Discharge Medications:     Discharge Medication List as of 2/25/2020  2:10 PM      START taking these medications    Details   predniSONE (STERAPRED DS) 10 mg dose pack See administration instruction per 10mg dose pack, Normal, Disp-21 Tab, R-0         CONTINUE these medications which have NOT CHANGED    Details   chlorthalidone (HYGROTEN) 25 mg tablet Take 25 mg by mouth daily. , Historical Med      acetaminophen (TYLENOL) 325 mg tablet Take 500 mg by mouth every four (4) hours as needed for Pain., Historical Med      OXYGEN-AIR DELIVERY SYSTEMS Take 2 L by inhalation continuous. , Historical Med      dextran 70/hypromellose (ARTIFICIAL TEARS, PF, OP) Apply 2 Drops to eye as needed for Other (both eyes for dryness). , Historical Med      diphenhydrAMINE (BENADRYL) 25 mg capsule Take 25 mg by mouth nightly as needed for Itching., Historical Med      albuterol-ipratropium (DUO-NEB) 2.5 mg-0.5 mg/3 ml nebu 3 mL by Nebulization route every four (4) hours as needed. , Print, Disp-30 Nebule, R-0      albuterol (PROVENTIL HFA, VENTOLIN HFA, PROAIR HFA) 90 mcg/actuation inhaler Take 2 Puffs by inhalation every four (4) hours as needed for Wheezing or Shortness of Breath., Normal, Disp-1 Inhaler, R-1      ipratropium (ATROVENT) 0.03 % nasal spray 2 Sprays by Both Nostrils route every twelve (12) hours as needed for Rhinitis., Historical Med      tamsulosin (FLOMAX) 0.4 mg capsule Take 0.4 mg by mouth every evening., Historical Med         STOP taking these medications       predniSONE (DELTASONE) 50 mg tablet Comments:   Reason for Stopping:               Discharge Condition: Good    Procedures : None    Consults: None      PHYSICAL EXAM   Visit Vitals  /73   Pulse 88   Temp 98.2 °F (36.8 °C)   Resp 20   Ht 5' 8\" (1.727 m)   Wt 96.2 kg (212 lb)   SpO2 97%   BMI 32.23 kg/m²     General: Awake, cooperative, no acute distress    HEENT: NC, Atraumatic. PERRLA, EOMI. Anicteric sclerae. Lungs:  CTA Bilaterally. No Wheezing/Rhonchi/Rales. Heart:  Regular  rhythm,  No murmur, No Rubs, No Gallops  Abdomen: Soft, Non distended, Non tender. +Bowel sounds,   Extremities: No c/c/e  Psych:   Not anxious or agitated. Neurologic:  No acute neurological deficits. Admission HPI :   Irwin Leblanc is a 66 y.o. male who has a hx of CKD, COPD on 2L home O2, HTN, asbestosis, and PAF who presents with worsening SOB today. Was seen in the ER yesterday and given prednisone to take at home. However, he worsened and was placed on BiPAP in the ER today. He reports SOB over the past 2-3 weeks that has gradually worsened. Hospital Course :   Mr. Josie Zhao was admitted to telemetry, he did not had any acute events during hospitalization. Chronic respiratory failure - continued with home oxygen at 2L NC     COPD exacerbation - he was start on Intravenous steroids, Inhaled short acting beta 2 agonist and anticholinergics and Empiric antibiotics. His breathing improved significantly, he will be discharged on prednisone taper dose.  He will need follow up with Dr. Joe Akbar.      PAF - maintained NSR     HTN - controlled on home medications.       Activity: Activity as tolerated    Diet: Cardiac Diet    Follow-up: PCP, pulmonary    Disposition: home    Minutes spent on discharge: 45       Labs: Results:       Chemistry Recent Labs     02/24/20  0115 02/23/20  0135   * 139*    134*   K 4.1 4.1    101   CO2 27 26   BUN 31* 24*   CREA 1.16 1.14   CA 8.1* 7.9*   AGAP 7 7   BUCR 27* 21*   AP 63 68   TP 5.4* 5.5*   ALB 2.6* 2.7*   GLOB 2.8 2.8   AGRAT 0.9 1.0      CBC w/Diff Recent Labs     02/24/20  0115 02/23/20  0135   WBC 7.9 10.3   RBC 3.39* 3.27*   HGB 9.7* 9.2*   HCT 30.3* 28.4*    283      Cardiac Enzymes No results for input(s): CPK, CKND1, WILLARD in the last 72 hours. No lab exists for component: CKRMB, TROIP   Coagulation No results for input(s): PTP, INR, APTT, INREXT, INREXT in the last 72 hours. Lipid Panel No results found for: CHOL, CHOLPOCT, CHOLX, CHLST, CHOLV, 434743, HDL, HDLP, LDL, LDLC, DLDLP, 721811, VLDLC, VLDL, TGLX, TRIGL, TRIGP, TGLPOCT, CHHD, CHHDX   BNP No results for input(s): BNPP in the last 72 hours. Liver Enzymes Recent Labs     02/24/20  0115   TP 5.4*   ALB 2.6*   AP 63   SGOT 8*      Thyroid Studies Lab Results   Component Value Date/Time    TSH 0.86 10/20/2019 01:05 AM            Significant Diagnostic Studies: Xr Chest Pa Lat    Result Date: 8/21/2019  EXAM: XR CHEST PA LAT CLINICAL INDICATION/HISTORY: Right lung opacities, chronic obstructive pulmonary disease, pneumonia -Additional: None COMPARISON: 8/19/2019 TECHNIQUE: Portable frontal view of the chest _______________ FINDINGS: SUPPORT DEVICES: None. HEART AND MEDIASTINUM: Stable appearing cardiac size and mediastinal contours. LUNGS AND PLEURAL SPACES: Redemonstration of asymmetric opacity at the right lung base which is slightly increased in appearance from immediate comparison examination. Pleural-based calcific density unchanged. No evidence of pneumothorax or pleural effusion. BONY THORAX AND SOFT TISSUES: No acute osseous abnormality. _______________     IMPRESSION: 1. Mild interval increase in asymmetric opacity at the right lung base, which may reflect mild interval worsening of airspace disease and/or atelectasis.      Xr Chest Port    Result Date: 9/6/2019  EXAM: XR CHEST PORT CLINICAL INDICATION/HISTORY: Shortness of breath -Additional: None COMPARISON: Several prior exams, most recently 8/21/2019 TECHNIQUE: Portable frontal view of the chest _______________ FINDINGS: SUPPORT DEVICES: None. HEART AND MEDIASTINUM: Table appearing cardiac size and mediastinal contours. LUNGS AND PLEURAL SPACES: Mild elevation of the right hemidiaphragm with faint asymmetric opacity the right lung base, appearing improved. Partially calcified pleural-based nodule object over the anterior to mid right lung demonstrated, unchanged. No pneumothorax or pleural effusion. BONY THORAX AND SOFT TISSUES: No acute osseous abnormality. _______________     IMPRESSION: 1. Mild atelectatic opacity at the right lung base, improved from prior examination without superimposed acute abnormality or significant interval change from prior. Xr Chest Port    Result Date: 8/19/2019  EXAM: XR CHEST PORT CLINICAL INDICATION/HISTORY: Respiratory distress -Additional: Chronic obstructive pulmonary disease COMPARISON: Several prior exams, most recently chest radiograph 7/12/2019 as well as prior CT scan of the chest 7/7/2019 TECHNIQUE: Frontal view of the chest _______________ FINDINGS: HEART AND MEDIASTINUM: Normal appearing cardiac size and mediastinal contours. LUNGS AND PLEURAL SPACES: Asymmetric opacity the right lung base is noted. Pleural-based calcification involving the anterior right hemithorax as noted on multiple prior exams. No pneumothorax. No definite pleural effusion. BONY THORAX AND SOFT TISSUES: No acute osseous abnormality _______________     IMPRESSION: 1. Mild asymmetric opacity at the right mid and lower lung base, potentially atelectasis or airspace disease. 2. Elliptical partially calcified right pleural based lesion unchanged. No results found for this or any previous visit.         CC: Tomasa Augustin MD

## 2020-02-25 NOTE — PROGRESS NOTES
Problem: Mobility Impaired (Adult and Pediatric)  Goal: *Acute Goals and Plan of Care (Insert Text)  Description  Physical Therapy Goals   Initiated 2/25/2020 and to be accomplished within 5-7 day(s)  1. Patient will move from supine <> sit with S in prep for out of bed activity and change of position. 2.  Patient will perform sit<> stand with S with LRAD in prep for transfers/ambulation. 3.  Patient will transfer from bed <> chair with S with LRAD for time up in chair for completion of ADL activity. 4.  Patient will ambulate >100 feet with LRAD/S for improved functional mobility/safe discharge. 5.  Patient will ascend/descend 3-5 stairs with handrails with minimal assistance/contact guard assist for home re-entry as needed. Outcome: Progressing Towards Goal   PHYSICAL THERAPY EVALUATION    Patient: Juanice Rubinstein (66 y.o. male)  Date: 2/25/2020  Primary Diagnosis: Acute respiratory failure ([facility] [J96.00]        Precautions: Fall    ASSESSMENT :  Based on the objective data described below, the patient presents with decrease independence w bed mobility, transfers, gait, and step negotiation. Pt seen in supine prior to session w/ supplemental O2 donned at 2L, IV, and telemonitor donned. Pt reported no pain at this time. Pt reports PTA that he uses a QC at home to assist w/ mobility. Pt reports he does not ambulate long distances and mostly sedentary. Pt reports he uses home O2 at 2L. Pt able to sit at the EOB without any difficulty. Pt's O2 levels assessed in the high 90s with supplemental O2. Pt able to ambulate to the restroom to perform toileting task w/o any difficulty and no LOB noted. Pt able to ambulate to the restroom to perform toileting task w/o any assistance needed for self cleaning. Pt transferred back to room where pt was left sitting at the EOB after session, call bell and tray in reach, nurse notified after session.      Patient will benefit from skilled intervention to address the above impairments. Patients rehabilitation potential is considered to be Good  Factors which may influence rehabilitation potential include:   []         None noted  []         Mental ability/status  []         Medical condition  [x]         Home/family situation and support systems  [x]         Safety awareness  []         Pain tolerance/management  []         Other:      PLAN :  Recommendations and Planned Interventions:  [x]           Bed Mobility Training             [x]    Neuromuscular Re-Education  [x]           Transfer Training                   []    Orthotic/Prosthetic Training  [x]           Gait Training                          []    Modalities  [x]           Therapeutic Exercises          []    Edema Management/Control  [x]           Therapeutic Activities            [x]    Patient and Family Training/Education  []           Other (comment):    Frequency/Duration: Patient will be followed by physical therapy 1-2 times per day to address goals. Discharge Recommendations: Home Health  Further Equipment Recommendations for Discharge: N/A     SUBJECTIVE:   Patient stated I feel okay, not too mad.     OBJECTIVE DATA SUMMARY:     Past Medical History:   Diagnosis Date    Brain aneurysm     Cancer ([facility]     prostate    COPD (chronic obstructive pulmonary disease) ([facility]     Hypertension     Nocturia     Pneumonia     Radiation effect      Past Surgical History:   Procedure Laterality Date    HX HERNIA REPAIR       Barriers to Learning/Limitations: yes;  physical  Compensate with: Verbal Cues and Tactile Cues  Prior Level of Function/Home Situation:   Home Situation  Home Environment: Trailer/mobile home  # Steps to Enter: 6  Rails to Enter: Yes  Hand Rails : Bilateral  One/Two Story Residence: One story  Living Alone: No  Support Systems: Child(prashant), Spouse/Significant Other/Partner  Patient Expects to be Discharged to[de-identified] Trailer/mobile home  Current DME Used/Available at Home: [address], rolling  Critical Behavior:  Neurologic State: Alert  Orientation Level: Oriented X4  Cognition: Appropriate decision making  Psychosocial  Purposeful Interaction: Yes  Pt Identified Daily Priority: Clinical issues (comment)  Caritas Process: Nurture loving kindness  Caring Interventions: Reassure  Reassure: Therapeutic listening  Therapeutic Modalities: Humor; Intentional therapeutic touch  Strength:    Strength: Generally decreased, functional  Tone & Sensation:   Tone: Normal  Sensation: Intact  Range Of Motion:  AROM: Generally decreased, functional  Functional Mobility:  Bed Mobility:  Supine to Sit: Supervision;Stand-by assistance  Scooting: Supervision;Stand-by assistance  Transfers:  Sit to Stand: Supervision;Contact guard assistance  Stand to Sit: Supervision;Contact guard assistance  Balance:   Sitting: Intact  Standing: Intact; With support  Ambulation/Gait Training:  Distance (ft): 35 Feet (ft)  Assistive Device: Gait belt;Walker, rolling  Ambulation - Level of Assistance: Contact guard assistance  Gait Description (WDL): Exceptions to WDL  Gait Abnormalities: Decreased step clearance  Base of Support: Narrowed  Speed/Lisbet: Slow  Step Length: Left shortened;Right shortened  Swing Pattern: Left asymmetrical;Right asymmetrical  Pain:  Pain Scale 1: Numeric (0 - 10)  Pain Intensity 1: 0  Activity Tolerance:   Fair  Please refer to the flowsheet for vital signs taken during this treatment. After treatment:   []         Patient left in no apparent distress sitting up in chair  [x]         Patient left in no apparent distress in bed (sitting at the EOB)  [x]         Call bell left within reach  [x]         Nursing notified  []         Caregiver present  []         Bed alarm activated    COMMUNICATION/EDUCATION:   [x]         Fall prevention education was provided and the patient/caregiver indicated understanding. [x]         Patient/family have participated as able in goal setting and plan of care.   [x] Patient/family agree to work toward stated goals and plan of care. []         Patient understands intent and goals of therapy, but is neutral about his/her participation. []         Patient is unable to participate in goal setting and plan of care.     Thank you for this referral.  Maico Boyd, PT   Time Calculation: 17 mins   Eval Complexity: History: HIGH Complexity :3+ comorbidities / personal factors will impact the outcome/ POC Exam:LOW Complexity : 1-2 Standardized tests and measures addressing body structure, function, activity limitation and / or participation in recreation  Presentation: LOW Complexity : Stable, uncomplicated  Clinical Decision Making:Low Complexity ambulate >30ft  Overall Complexity:LOW

## 2020-02-25 NOTE — PROGRESS NOTES
Problem: Falls - Risk of  Goal: *Absence of Falls  Description  Document Sandoval Graham Fall Risk and appropriate interventions in the flowsheet. Outcome: Resolved/Met  Note: Fall Risk Interventions:  Mobility Interventions: Communicate number of staff needed for ambulation/transfer         Medication Interventions: Evaluate medications/consider consulting pharmacy    Elimination Interventions: Call light in reach              Problem: Patient Education: Go to Patient Education Activity  Goal: Patient/Family Education  Outcome: Resolved/Met     Problem: Patient Education: Go to Patient Education Activity  Goal: Patient/Family Education  Outcome: Resolved/Met     Problem: Chronic Renal Failure  Goal: *Fluid and electrolytes stabilized  Outcome: Resolved/Met     Problem: Chronic Obstructive Pulmonary Disease (COPD)  Goal: *Oxygen saturation during activity within specified parameters  Outcome: Resolved/Met  Goal: *Able to remain out of bed as prescribed  Outcome: Resolved/Met  Goal: *Absence of hypoxia  Outcome: Resolved/Met  Goal: *Optimize nutritional status  Outcome: Resolved/Met     Problem: Gas Exchange - Impaired  Goal: *Absence of hypoxia  Outcome: Resolved/Met     Problem: Patient Education: Go to Patient Education Activity  Goal: Patient/Family Education  Outcome: Resolved/Met     Problem: Pain  Goal: *Control of Pain  Outcome: Resolved/Met  Goal: *PALLIATIVE CARE:  Alleviation of Pain  Outcome: Resolved/Met     Problem: Patient Education: Go to Patient Education Activity  Goal: Patient/Family Education  Outcome: Resolved/Met     Problem: Pressure Injury - Risk of  Goal: *Prevention of pressure injury  Description  Document Nikos Scale and appropriate interventions in the flowsheet. Outcome: Resolved/Met  Note: Pressure Injury Interventions:             Activity Interventions: Increase time out of bed    Mobility Interventions: HOB 30 degrees or less    Nutrition Interventions: Document food/fluid/supplement intake                     Problem: Patient Education: Go to Patient Education Activity  Goal: Patient/Family Education  Outcome: Resolved/Met     Problem: Patient Education: Go to Patient Education Activity  Goal: Patient/Family Education  Outcome: Resolved/Met     Problem: Patient Education: Go to Patient Education Activity  Goal: Patient/Family Education  Outcome: Resolved/Met    Marita Washburn RN

## 2020-02-27 ENCOUNTER — HOME CARE VISIT (OUTPATIENT)
Dept: HOME HEALTH SERVICES | Facility: HOME HEALTH | Age: 75
End: 2020-02-27

## 2020-03-02 ENCOUNTER — HOME CARE VISIT (OUTPATIENT)
Dept: HOME HEALTH SERVICES | Facility: HOME HEALTH | Age: 75
End: 2020-03-02

## 2020-03-04 ENCOUNTER — HOME CARE VISIT (OUTPATIENT)
Dept: HOME HEALTH SERVICES | Facility: HOME HEALTH | Age: 75
End: 2020-03-04

## 2020-03-17 NOTE — PROGRESS NOTES
Patient given written and verbal discharge instructions and verbalizes 
understanding.  ER MD discussed with patient the results and treatment 
provided. Patient in stable condition. ID arm band removed. 

Rx of Cepacol given. Patient educated on pain management and to follow up with 
PMD. Pain Scale 3/10.

Opportunity for questions provided and answered. Medication side effect fact 
sheet provided. Problem: Mobility Impaired (Adult and Pediatric)  Goal: *Acute Goals and Plan of Care (Insert Text)  Description  Physical Therapy Goals   Initiated 8/21/2019 and to be accomplished within 3-5 day(s)  1. Patient will move from supine <> sit with S in prep for out of bed activity and change of position. 2.  Patient will perform sit<> stand with S with LRAD in prep for transfers/ambulation. 3.  Patient will transfer from bed <> chair with S with LRAD for time up in chair for completion of ADL activity. 4.  Patient will ambulate 150 feet with LRAD/S for improved functional mobility/safe discharge. 5.  Patient will ascend/descend 3-5 stairs with handrail with minimal assistance/contact guard assist for home re-entry as needed. Outcome: Progressing Towards Goal     PHYSICAL THERAPY TREATMENT    Patient: Geovanni Morris (44 y.o. male)  Date: 8/22/2019  Diagnosis: COPD (chronic obstructive pulmonary disease) ([facility]) [J44.9]  RLL pneumonia ([facility]) [J18.1] COPD with acute exacerbation ([facility])       Precautions: Fall(O2, SPO2)   Chart, physical therapy assessment, plan of care and goals were reviewed. ASSESSMENT:  Pt is very mobile and able to increase ambulation distance, with use of RW. No LOB episode during today's treatment and SPO2 on 96-99% on 1L NC. Progression toward goals:  ?      Improving appropriately and progressing toward goals  ? Improving slowly and progressing toward goals  ? Not making progress toward goals and plan of care will be adjusted     PLAN:  Patient continues to benefit from skilled intervention to address the above impairments. Continue treatment per established plan of care. Discharge Recommendations:  Home Health  Further Equipment Recommendations for Discharge:  bedside commode and rolling walker     SUBJECTIVE:   Patient stated I'm hoping to go today.     OBJECTIVE DATA SUMMARY:   Critical Behavior:  Neurologic State: Alert  Orientation Level: Oriented X4  Cognition: Appropriate decision making, Appropriate for age attention/concentration, Appropriate safety awareness, Follows commands  Safety/Judgement: Awareness of environment, Fall prevention, Decreased insight into deficits  Functional Mobility Training:  Bed Mobility:  Rolling: Modified independent  Supine to Sit: Modified independent  Sit to Supine: Modified independent  Scooting: Modified independent  Transfers:  Sit to Stand: Supervision  Stand to Sit: Supervision  Balance:  Sitting: Intact  Standing: Intact; With support  Ambulation/Gait Training:  Distance (ft): 350 Feet (ft)  Assistive Device: Gait belt;Walker, rolling  Ambulation - Level of Assistance: Contact guard assistance  Gait Abnormalities: Decreased step clearance  Base of Support: Narrowed  Speed/Lisbet: Slow  Step Length: Left shortened;Right shortened  Swing Pattern: Left asymmetrical;Right asymmetrical  Pain:  Pain Scale 1: Numeric (0 - 10)  Pain Intensity 1: 0  Pain out: 0  Activity Tolerance:   Good  Please refer to the flowsheet for vital signs taken during this treatment. After treatment:   ? Patient left in no apparent distress sitting EOB  ? Patient left in no apparent distress in bed  ? Call bell left within reach  ? Nursing notified  ? Caregiver present  ?  Bed alarm activated      Kalli Mares PTA   Time Calculation: 30 mins

## 2020-04-09 ENCOUNTER — HOSPITAL ENCOUNTER (INPATIENT)
Age: 75
LOS: 5 days | Discharge: HOME | DRG: 189 | End: 2020-04-14
Attending: EMERGENCY MEDICINE | Admitting: FAMILY MEDICINE
Payer: MEDICARE

## 2020-04-09 ENCOUNTER — APPOINTMENT (OUTPATIENT)
Dept: GENERAL RADIOLOGY | Age: 75
DRG: 189 | End: 2020-04-09
Attending: EMERGENCY MEDICINE
Payer: MEDICARE

## 2020-04-09 DIAGNOSIS — J96.01 ACUTE RESPIRATORY FAILURE WITH HYPOXIA AND HYPERCARBIA (HCC): Primary | ICD-10-CM

## 2020-04-09 DIAGNOSIS — J44.1 ACUTE EXACERBATION OF CHRONIC OBSTRUCTIVE PULMONARY DISEASE (COPD) (HCC): ICD-10-CM

## 2020-04-09 DIAGNOSIS — J96.02 ACUTE RESPIRATORY FAILURE WITH HYPOXIA AND HYPERCARBIA (HCC): Primary | ICD-10-CM

## 2020-04-09 LAB
ALBUMIN SERPL-MCNC: 3.2 G/DL (ref 3.4–5)
ALBUMIN/GLOB SERPL: 1 {RATIO} (ref 0.8–1.7)
ALP SERPL-CCNC: 107 U/L (ref 45–117)
ALT SERPL-CCNC: 15 U/L (ref 16–61)
ANION GAP SERPL CALC-SCNC: 9 MMOL/L (ref 3–18)
ARTERIAL PATENCY WRIST A: ABNORMAL
AST SERPL-CCNC: 14 U/L (ref 10–38)
BASE DEFICIT BLD-SCNC: 1 MMOL/L
BASOPHILS # BLD: 0.1 K/UL (ref 0–0.1)
BASOPHILS NFR BLD: 1 % (ref 0–2)
BDY SITE: ABNORMAL
BILIRUB SERPL-MCNC: 0.3 MG/DL (ref 0.2–1)
BNP SERPL-MCNC: 105 PG/ML (ref 0–1800)
BUN SERPL-MCNC: 19 MG/DL (ref 7–18)
BUN/CREAT SERPL: 14 (ref 12–20)
CALCIUM SERPL-MCNC: 8.6 MG/DL (ref 8.5–10.1)
CHLORIDE SERPL-SCNC: 98 MMOL/L (ref 100–111)
CK MB CFR SERPL CALC: 3.5 % (ref 0–4)
CK MB SERPL-MCNC: 3.6 NG/ML (ref 5–25)
CK SERPL-CCNC: 103 U/L (ref 39–308)
CO2 SERPL-SCNC: 25 MMOL/L (ref 21–32)
CREAT SERPL-MCNC: 1.34 MG/DL (ref 0.6–1.3)
DIFFERENTIAL METHOD BLD: ABNORMAL
EOSINOPHIL # BLD: 1.6 K/UL (ref 0–0.4)
EOSINOPHIL NFR BLD: 13 % (ref 0–5)
ERYTHROCYTE [DISTWIDTH] IN BLOOD BY AUTOMATED COUNT: 14.4 % (ref 11.6–14.5)
GAS FLOW.O2 O2 DELIVERY SYS: ABNORMAL L/MIN
GAS FLOW.O2 SETTING OXYMISER: 50 L/M
GLOBULIN SER CALC-MCNC: 3.3 G/DL (ref 2–4)
GLUCOSE SERPL-MCNC: 148 MG/DL (ref 74–99)
HCO3 BLD-SCNC: 23.6 MMOL/L (ref 22–26)
HCT VFR BLD AUTO: 32.5 % (ref 36–48)
HGB BLD-MCNC: 10.9 G/DL (ref 13–16)
LACTATE SERPL-SCNC: 1.1 MMOL/L (ref 0.4–2)
LYMPHOCYTES # BLD: 1.4 K/UL (ref 0.9–3.6)
LYMPHOCYTES NFR BLD: 11 % (ref 21–52)
MAGNESIUM SERPL-MCNC: 2.1 MG/DL (ref 1.6–2.6)
MCH RBC QN AUTO: 28 PG (ref 24–34)
MCHC RBC AUTO-ENTMCNC: 33.5 G/DL (ref 31–37)
MCV RBC AUTO: 83.5 FL (ref 74–97)
MONOCYTES # BLD: 1.3 K/UL (ref 0.05–1.2)
MONOCYTES NFR BLD: 10 % (ref 3–10)
NEUTS SEG # BLD: 7.9 K/UL (ref 1.8–8)
NEUTS SEG NFR BLD: 65 % (ref 40–73)
O2/TOTAL GAS SETTING VFR VENT: 0.4 %
PCO2 BLD: 39.1 MMHG (ref 35–45)
PH BLD: 7.39 [PH] (ref 7.35–7.45)
PLATELET # BLD AUTO: 317 K/UL (ref 135–420)
PMV BLD AUTO: 9.1 FL (ref 9.2–11.8)
PO2 BLD: 184 MMHG (ref 80–100)
POTASSIUM SERPL-SCNC: 3.9 MMOL/L (ref 3.5–5.5)
PROT SERPL-MCNC: 6.5 G/DL (ref 6.4–8.2)
RBC # BLD AUTO: 3.89 M/UL (ref 4.7–5.5)
SAO2 % BLD: 100 % (ref 92–97)
SERVICE CMNT-IMP: ABNORMAL
SODIUM SERPL-SCNC: 132 MMOL/L (ref 136–145)
SPECIMEN TYPE: ABNORMAL
TOTAL RESP. RATE, ITRR: 25
TROPONIN I SERPL-MCNC: <0.02 NG/ML (ref 0–0.04)
WBC # BLD AUTO: 12.3 K/UL (ref 4.6–13.2)

## 2020-04-09 PROCEDURE — 83735 ASSAY OF MAGNESIUM: CPT

## 2020-04-09 PROCEDURE — 94762 N-INVAS EAR/PLS OXIMTRY CONT: CPT

## 2020-04-09 PROCEDURE — 82550 ASSAY OF CK (CPK): CPT

## 2020-04-09 PROCEDURE — 87040 BLOOD CULTURE FOR BACTERIA: CPT

## 2020-04-09 PROCEDURE — 94640 AIRWAY INHALATION TREATMENT: CPT

## 2020-04-09 PROCEDURE — 36600 WITHDRAWAL OF ARTERIAL BLOOD: CPT

## 2020-04-09 PROCEDURE — 77010033711 HC HIGH FLOW OXYGEN

## 2020-04-09 PROCEDURE — 82803 BLOOD GASES ANY COMBINATION: CPT

## 2020-04-09 PROCEDURE — 74011250636 HC RX REV CODE- 250/636: Performed by: FAMILY MEDICINE

## 2020-04-09 PROCEDURE — 74011250637 HC RX REV CODE- 250/637: Performed by: EMERGENCY MEDICINE

## 2020-04-09 PROCEDURE — 77030020362 HC SOL INJ STRL H2O 1000ML BG LF

## 2020-04-09 PROCEDURE — 93005 ELECTROCARDIOGRAM TRACING: CPT

## 2020-04-09 PROCEDURE — 85025 COMPLETE CBC W/AUTO DIFF WBC: CPT

## 2020-04-09 PROCEDURE — 65660000000 HC RM CCU STEPDOWN

## 2020-04-09 PROCEDURE — 83880 ASSAY OF NATRIURETIC PEPTIDE: CPT

## 2020-04-09 PROCEDURE — 99285 EMERGENCY DEPT VISIT HI MDM: CPT

## 2020-04-09 PROCEDURE — 83605 ASSAY OF LACTIC ACID: CPT

## 2020-04-09 PROCEDURE — 80053 COMPREHEN METABOLIC PANEL: CPT

## 2020-04-09 PROCEDURE — 74011250636 HC RX REV CODE- 250/636: Performed by: EMERGENCY MEDICINE

## 2020-04-09 PROCEDURE — 96375 TX/PRO/DX INJ NEW DRUG ADDON: CPT

## 2020-04-09 PROCEDURE — 71045 X-RAY EXAM CHEST 1 VIEW: CPT

## 2020-04-09 PROCEDURE — 96374 THER/PROPH/DIAG INJ IV PUSH: CPT

## 2020-04-09 RX ORDER — NALOXONE HYDROCHLORIDE 0.4 MG/ML
0.4 INJECTION, SOLUTION INTRAMUSCULAR; INTRAVENOUS; SUBCUTANEOUS AS NEEDED
Status: DISCONTINUED | OUTPATIENT
Start: 2020-04-09 | End: 2020-04-14 | Stop reason: HOSPADM

## 2020-04-09 RX ORDER — ALBUTEROL SULFATE 90 UG/1
2 AEROSOL, METERED RESPIRATORY (INHALATION)
Status: DISCONTINUED | OUTPATIENT
Start: 2020-04-09 | End: 2020-04-10

## 2020-04-09 RX ORDER — SODIUM CHLORIDE 0.9 % (FLUSH) 0.9 %
5-40 SYRINGE (ML) INJECTION EVERY 8 HOURS
Status: DISCONTINUED | OUTPATIENT
Start: 2020-04-09 | End: 2020-04-14 | Stop reason: HOSPADM

## 2020-04-09 RX ORDER — HYDROCODONE BITARTRATE AND ACETAMINOPHEN 5; 325 MG/1; MG/1
1 TABLET ORAL
Status: DISCONTINUED | OUTPATIENT
Start: 2020-04-09 | End: 2020-04-14 | Stop reason: HOSPADM

## 2020-04-09 RX ORDER — ACETAMINOPHEN 325 MG/1
650 TABLET ORAL
Status: DISCONTINUED | OUTPATIENT
Start: 2020-04-09 | End: 2020-04-14 | Stop reason: HOSPADM

## 2020-04-09 RX ORDER — SODIUM CHLORIDE 0.9 % (FLUSH) 0.9 %
5-10 SYRINGE (ML) INJECTION AS NEEDED
Status: DISCONTINUED | OUTPATIENT
Start: 2020-04-09 | End: 2020-04-14 | Stop reason: HOSPADM

## 2020-04-09 RX ORDER — INSULIN LISPRO 100 [IU]/ML
INJECTION, SOLUTION INTRAVENOUS; SUBCUTANEOUS
Status: DISCONTINUED | OUTPATIENT
Start: 2020-04-09 | End: 2020-04-14 | Stop reason: HOSPADM

## 2020-04-09 RX ORDER — ONDANSETRON 2 MG/ML
4 INJECTION INTRAMUSCULAR; INTRAVENOUS
Status: DISCONTINUED | OUTPATIENT
Start: 2020-04-09 | End: 2020-04-14 | Stop reason: HOSPADM

## 2020-04-09 RX ORDER — MAGNESIUM SULFATE 100 %
16 CRYSTALS MISCELLANEOUS AS NEEDED
Status: DISCONTINUED | OUTPATIENT
Start: 2020-04-09 | End: 2020-04-14 | Stop reason: HOSPADM

## 2020-04-09 RX ORDER — DEXTROSE MONOHYDRATE 100 MG/ML
125-250 INJECTION, SOLUTION INTRAVENOUS AS NEEDED
Status: DISCONTINUED | OUTPATIENT
Start: 2020-04-09 | End: 2020-04-14 | Stop reason: HOSPADM

## 2020-04-09 RX ORDER — NITROGLYCERIN 40 MG/100ML
0-20 INJECTION INTRAVENOUS
Status: DISCONTINUED | OUTPATIENT
Start: 2020-04-09 | End: 2020-04-09

## 2020-04-09 RX ORDER — SODIUM CHLORIDE 0.9 % (FLUSH) 0.9 %
5-40 SYRINGE (ML) INJECTION AS NEEDED
Status: DISCONTINUED | OUTPATIENT
Start: 2020-04-09 | End: 2020-04-14 | Stop reason: HOSPADM

## 2020-04-09 RX ORDER — FUROSEMIDE 10 MG/ML
40 INJECTION INTRAMUSCULAR; INTRAVENOUS
Status: COMPLETED | OUTPATIENT
Start: 2020-04-09 | End: 2020-04-09

## 2020-04-09 RX ORDER — HEPARIN SODIUM 5000 [USP'U]/ML
5000 INJECTION, SOLUTION INTRAVENOUS; SUBCUTANEOUS EVERY 8 HOURS
Status: DISCONTINUED | OUTPATIENT
Start: 2020-04-09 | End: 2020-04-14 | Stop reason: HOSPADM

## 2020-04-09 RX ADMIN — FUROSEMIDE 40 MG: 10 INJECTION, SOLUTION INTRAMUSCULAR; INTRAVENOUS at 19:50

## 2020-04-09 RX ADMIN — HEPARIN SODIUM 5000 UNITS: 5000 INJECTION INTRAVENOUS; SUBCUTANEOUS at 23:34

## 2020-04-09 RX ADMIN — METHYLPREDNISOLONE SODIUM SUCCINATE 125 MG: 125 INJECTION, POWDER, FOR SOLUTION INTRAMUSCULAR; INTRAVENOUS at 19:50

## 2020-04-09 RX ADMIN — ALBUTEROL SULFATE 2 PUFF: 90 AEROSOL, METERED RESPIRATORY (INHALATION) at 19:57

## 2020-04-09 RX ADMIN — METHYLPREDNISOLONE SODIUM SUCCINATE 60 MG: 125 INJECTION, POWDER, FOR SOLUTION INTRAMUSCULAR; INTRAVENOUS at 23:34

## 2020-04-09 RX ADMIN — Medication 10 ML: at 22:00

## 2020-04-09 NOTE — ED TRIAGE NOTES
Patient brought in via EMS for SOB, patient stated he took a breathing treatment at home prior to arrival, patient states it did not help.

## 2020-04-09 NOTE — ED PROVIDER NOTES
EMERGENCY DEPARTMENT HISTORY AND PHYSICAL EXAM    Date: 4/9/2020  Patient Name: Abhinav Tobar    History of Presenting Illness     Chief Complaint   Patient presents with    Shortness of Breath         History Provided By: Patient    Additional History (Context):   Abhinav Tobar is a 66 y.o. male with PMHX COPD on chronic 2 L of oxygen at home presents to the emergency department via EMS in respiratory distress. EMS reports that when they arrived patient was attempting to give himself a nebulizer treatment and stated that it was not helping. Patient was 90% to 93% on room air. Was transported on 4 L and saturating in the high 90s. History is somewhat limited secondary to the patient's respiratory distress. EMS reports that the patient has been home with his wife. Has had no COVID exposures or risk factors. Patient with prior history of A. fib but Eliquis discontinued previously. PCP: Maria Guadalupe Sanchez MD    Current Facility-Administered Medications   Medication Dose Route Frequency Provider Last Rate Last Dose    albuterol (PROVENTIL HFA, VENTOLIN HFA, PROAIR HFA) inhaler 2 Puff  2 Puff Inhalation Q4H PRN Alex Andrade DO   2 Puff at 04/09/20 1957    sodium chloride (NS) flush 5-10 mL  5-10 mL IntraVENous PRN Alex Andrade, DO         Current Outpatient Medications   Medication Sig Dispense Refill    predniSONE (STERAPRED DS) 10 mg dose pack See administration instruction per 10mg dose pack 21 Tab 0    chlorthalidone (HYGROTEN) 25 mg tablet Take 25 mg by mouth daily.  acetaminophen (TYLENOL) 325 mg tablet Take 500 mg by mouth every four (4) hours as needed for Pain.  OXYGEN-AIR DELIVERY SYSTEMS Take 2 L by inhalation continuous.  dextran 70/hypromellose (ARTIFICIAL TEARS, PF, OP) Apply 2 Drops to eye as needed for Other (both eyes for dryness).  diphenhydrAMINE (BENADRYL) 25 mg capsule Take 25 mg by mouth nightly as needed for Itching.       albuterol-ipratropium (DUO-NEB) 2.5 mg-0.5 mg/3 ml nebu 3 mL by Nebulization route every four (4) hours as needed. (Patient taking differently: 3 mL by Nebulization route every four (4) hours as needed for Other (Shortness of breath). ) 30 Nebule 0    albuterol (PROVENTIL HFA, VENTOLIN HFA, PROAIR HFA) 90 mcg/actuation inhaler Take 2 Puffs by inhalation every four (4) hours as needed for Wheezing or Shortness of Breath. 1 Inhaler 1    ipratropium (ATROVENT) 0.03 % nasal spray 2 Sprays by Both Nostrils route every twelve (12) hours as needed for Rhinitis.  tamsulosin (FLOMAX) 0.4 mg capsule Take 0.4 mg by mouth every evening. Past History     Past Medical History:  Past Medical History:   Diagnosis Date    Brain aneurysm     Cancer ([address]     prostate    COPD (chronic obstructive pulmonary disease) ([address]     Hypertension     Nocturia     Pneumonia     Radiation effect        Past Surgical History:  Past Surgical History:   Procedure Laterality Date    HX HERNIA REPAIR         Family History:  Family History   Problem Relation Age of Onset    Heart Disease Mother        Social History:  Social History     Tobacco Use    Smoking status: Former Smoker     Types: Cigarettes    Smokeless tobacco: Never Used   Substance Use Topics    Alcohol use: No    Drug use: Never       Allergies:   Allergies   Allergen Reactions    Aspirin Other (comments)     \"messes my stomach up\"         Review of Systems   Review of Systems   Unable to perform ROS: Severe respiratory distress       Physical Exam     Vitals:    04/09/20 2015 04/09/20 2020 04/09/20 2025 04/09/20 2030   BP: 124/66   134/77   Pulse: (!) 117   (!) 110   Resp: 22   24   SpO2: 100% 99% 100% 100%   Weight:       Height:         Physical Exam    Nursing note and vitals reviewed    Constitutional: Elderly  male, in severe respiratory distress, tripoding, tachypneic, dyspneic unable to speak in full sentences  Head: Normocephalic, Atraumatic  Eyes: Pupils are equal, round, and reactive to light, EOMI  Neck: Supple, non-tender  Cardiovascular: Tachycardic no murmurs, rubs, or gallops  Chest: Dyspneic, tachypneic, tripoding   Lungs: Diminished breath sounds, minimal wheezes  Abdomen: Soft, non tender, non distended, normoactive bowel sounds  Back: No evidence of trauma or deformity  Extremities: No evidence of trauma or deformity, +2 pitting edema of his lower extremities bilaterally edema.  No streaking erythema, vesicular lesions, ulcerations or bulla  Skin: Warm and dry, normal cap refill  Neuro: Alert and appropriate, CN intact, moving all 4 extremities freely and symmetrically         Diagnostic Study Results     Labs -     Recent Results (from the past 12 hour(s))   EKG, 12 LEAD, INITIAL    Collection Time: 04/09/20  7:41 PM   Result Value Ref Range    Ventricular Rate 119 BPM    Atrial Rate 119 BPM    P-R Interval 158 ms    QRS Duration 78 ms    Q-T Interval 312 ms    QTC Calculation (Bezet) 438 ms    Calculated P Axis 115 degrees    Calculated R Axis 26 degrees    Calculated T Axis 79 degrees    Diagnosis       Sinus tachycardia with frequent premature ventricular complexes  Otherwise normal ECG  When compared with ECG of 21-FEB-2020 18:15,  fusion complexes are no longer present     LACTIC ACID    Collection Time: 04/09/20  7:45 PM   Result Value Ref Range    Lactic acid 1.1 0.4 - 2.0 MMOL/L   METABOLIC PANEL, COMPREHENSIVE    Collection Time: 04/09/20  7:45 PM   Result Value Ref Range    Sodium 132 (L) 136 - 145 mmol/L    Potassium 3.9 3.5 - 5.5 mmol/L    Chloride 98 (L) 100 - 111 mmol/L    CO2 25 21 - 32 mmol/L    Anion gap 9 3.0 - 18 mmol/L    Glucose 148 (H) 74 - 99 mg/dL    BUN 19 (H) 7.0 - 18 MG/DL    Creatinine 1.34 (H) 0.6 - 1.3 MG/DL    BUN/Creatinine ratio 14 12 - 20      GFR est AA >60 >60 ml/min/1.73m2    GFR est non-AA 52 (L) >60 ml/min/1.73m2    Calcium 8.6 8.5 - 10.1 MG/DL    Bilirubin, total 0.3 0.2 - 1.0 MG/DL    ALT (SGPT) 15 (L) 16 - 61 U/L    AST (SGOT) 14 10 - 38 U/L    Alk. phosphatase 107 45 - 117 U/L    Protein, total 6.5 6.4 - 8.2 g/dL    Albumin 3.2 (L) 3.4 - 5.0 g/dL    Globulin 3.3 2.0 - 4.0 g/dL    A-G Ratio 1.0 0.8 - 1.7     CBC WITH AUTOMATED DIFF    Collection Time: 04/09/20  7:45 PM   Result Value Ref Range    WBC 12.3 4.6 - 13.2 K/uL    RBC 3.89 (L) 4.70 - 5.50 M/uL    HGB 10.9 (L) 13.0 - 16.0 g/dL    HCT 32.5 (L) 36.0 - 48.0 %    MCV 83.5 74.0 - 97.0 FL    MCH 28.0 24.0 - 34.0 PG    MCHC 33.5 31.0 - 37.0 g/dL    RDW 14.4 11.6 - 14.5 %    PLATELET 848 863 - 897 K/uL    MPV 9.1 (L) 9.2 - 11.8 FL    NEUTROPHILS 65 40 - 73 %    LYMPHOCYTES 11 (L) 21 - 52 %    MONOCYTES 10 3 - 10 %    EOSINOPHILS 13 (H) 0 - 5 %    BASOPHILS 1 0 - 2 %    ABS. NEUTROPHILS 7.9 1.8 - 8.0 K/UL    ABS. LYMPHOCYTES 1.4 0.9 - 3.6 K/UL    ABS. MONOCYTES 1.3 (H) 0.05 - 1.2 K/UL    ABS. EOSINOPHILS 1.6 (H) 0.0 - 0.4 K/UL    ABS. BASOPHILS 0.1 0.0 - 0.1 K/UL    DF AUTOMATED     MAGNESIUM    Collection Time: 04/09/20  7:45 PM   Result Value Ref Range    Magnesium 2.1 1.6 - 2.6 mg/dL   CARDIAC PANEL,(CK, CKMB & TROPONIN)    Collection Time: 04/09/20  7:45 PM   Result Value Ref Range     39 - 308 U/L    CK - MB 3.6 (H) <3.6 ng/ml    CK-MB Index 3.5 0.0 - 4.0 %    Troponin-I, QT <0.02 0.0 - 0.045 NG/ML   NT-PRO BNP    Collection Time: 04/09/20  7:45 PM   Result Value Ref Range    NT pro- 0 - 1,800 PG/ML   POC G3    Collection Time: 04/09/20  8:29 PM   Result Value Ref Range    Device: High Flow Nasal Cannula      Flow rate (POC) 50 L/M    FIO2 (POC) 0.4 %    pH (POC) 7.390 7.35 - 7.45      pCO2 (POC) 39.1 35.0 - 45.0 MMHG    pO2 (POC) 184 (H) 80 - 100 MMHG    HCO3 (POC) 23.6 22 - 26 MMOL/L    sO2 (POC) 100 (H) 92 - 97 %    Base deficit (POC) 1 mmol/L    Allens test (POC) N/A      Total resp.  rate 25      Site RIGHT RADIAL      Specimen type (POC) ARTERIAL      Performed by Shilpa Franklin        Radiologic Studies -   XR CHEST PORT (Results Pending)   RADIOLOGY FINDINGS  Chest x-ray shows chronic scarring, no obvious new acute focal consolidation. Pending review by Radiologist  Recorded by Dafne Andrade, DO      CT Results  (Last 48 hours)    None        CXR Results  (Last 48 hours)    None            Medical Decision Making   I am the first provider for this patient. I reviewed the vital signs, available nursing notes, past medical history, past surgical history, family history and social history. Vital Signs-Reviewed the patient's vital signs. Pulse Oximetry Analysis -99 % on 4L  Cardiac Monitor:  Rate: 122 bpm  Rhythm: Regular    EKG interpretation: (Preliminary)  7:32 PM   Sinus tachycardia with PVCs at 119 beats minute. QTc 438 ms. Records Reviewed: Nursing Notes and Old Medical Records    Provider Notes:   66 y.o. male presenting in severe respiratory distress. He is dyspneic, tachypneic, tripoding. He has diminished breath sounds. Patient meeting Sirs criteria due to his tachypnea and tachycardia, will initiate sepsis work-up. Likely underlying COPD exacerbation versus. Concern for possible flash pulmonary edema as patient noted to have lower extremity edema and initially hypertensive. If patient continues to be hypertensive, will start with nitro paste and nitro drip. Will give a dose of Lasix. In light of the COVID pandemic, attempt to avoid BiPAP. Will start the patient on high flow. However patient with no COVID risk factors, reportedly has been staying at home with his wife. Will attempt to avoid nebulizers, will give MDI treatments. Will give a dose of Solu-Medrol. Will require admission. Procedures:  Procedures    ED Course:   7:32 PM   Initial assessment performed. The patients presenting problems have been discussed, and they are in agreement with the care plan formulated and outlined with them. I have encouraged them to ask questions as they arise throughout their visit.     8:59 PM  Patient's lactic acid within normal limits. WBC 12.3 with no left shift or bandemia. Hemoglobin stable at 10.9. Troponin negative. BNP within normal limits. ABG reassuring. Patient's work of breathing is improved after being placed on high flow. Chest x-ray showing no obvious focal consolidation. Relatively unchanged from prior x-ray. Diagnosis and Disposition     8:58 PM  I have spent 75 minutes of critical care time involved in lab review, consultations with specialist, family decision-making, and documentation. During this entire length of time I was immediately available to the patient. Critical Care: The reason for providing this level of medical care for this critically ill patient was due a critical illness that impaired one or more vital organ systems such that there was a high probability of imminent or life threatening deterioration in the patients condition. This care involved high complexity decision making to assess, manipulate, and support vital system functions, to treat this degreee vital organ system failure and to prevent further life threatening deterioration of the patients condition. Core Measures:  For Hospitalized Patients:    1. Hospitalization Decision Time:  The decision to hospitalize the patient was made by Ino Glass DO at 8:58 PM on 4/9/2020    2. Aspirin: Aspirin was not given because the patient did not present with a stroke at the time of their Emergency Department evaluation    8:03 PM  Patient is being admitted to the hospital by Dr. Cortes Fowler. The results of their tests and reasons for their admission have been discussed with them and/or available family. They convey agreement and understanding for the need to be admitted and for their admission diagnosis. CONDITIONS ON ADMISSION:  Sepsis is not present at the time of admission. Pneumonia is not present at the time of admission. MRSA is not present at the time of admission. Wound infection is not present at the time of admission. Pressure Ulcer is not present at the time of admission. CLINICAL IMPRESSION:    1. Acute respiratory failure with hypoxia and hypercarbia (HCC)    2. Acute exacerbation of chronic obstructive pulmonary disease (COPD) ([facility]      ____________________________________     Please note that this dictation was completed with [employer], the computer voice recognition software. Quite often unanticipated grammatical, syntax, homophones, and other interpretive errors are inadvertently transcribed by the computer software. Please disregard these errors. Please excuse any errors that have escaped final proofreading.

## 2020-04-10 PROBLEM — E87.1 HYPONATREMIA: Status: ACTIVE | Noted: 2020-04-10

## 2020-04-10 LAB
ANION GAP SERPL CALC-SCNC: 9 MMOL/L (ref 3–18)
APPEARANCE UR: CLEAR
ATRIAL RATE: 119 BPM
BILIRUB UR QL: NEGATIVE
BUN SERPL-MCNC: 19 MG/DL (ref 7–18)
BUN/CREAT SERPL: 14 (ref 12–20)
CALCIUM SERPL-MCNC: 8.6 MG/DL (ref 8.5–10.1)
CALCULATED P AXIS, ECG09: 115 DEGREES
CALCULATED R AXIS, ECG10: 26 DEGREES
CALCULATED T AXIS, ECG11: 79 DEGREES
CHLORIDE SERPL-SCNC: 96 MMOL/L (ref 100–111)
CO2 SERPL-SCNC: 26 MMOL/L (ref 21–32)
COLOR UR: YELLOW
CREAT SERPL-MCNC: 1.32 MG/DL (ref 0.6–1.3)
DIAGNOSIS, 93000: NORMAL
ERYTHROCYTE [DISTWIDTH] IN BLOOD BY AUTOMATED COUNT: 14.3 % (ref 11.6–14.5)
GLUCOSE BLD STRIP.AUTO-MCNC: 139 MG/DL (ref 70–110)
GLUCOSE BLD STRIP.AUTO-MCNC: 155 MG/DL (ref 70–110)
GLUCOSE BLD STRIP.AUTO-MCNC: 166 MG/DL (ref 70–110)
GLUCOSE BLD STRIP.AUTO-MCNC: 176 MG/DL (ref 70–110)
GLUCOSE SERPL-MCNC: 171 MG/DL (ref 74–99)
GLUCOSE UR STRIP.AUTO-MCNC: NEGATIVE MG/DL
HCT VFR BLD AUTO: 32 % (ref 36–48)
HGB BLD-MCNC: 10.9 G/DL (ref 13–16)
HGB UR QL STRIP: NEGATIVE
KETONES UR QL STRIP.AUTO: NEGATIVE MG/DL
LEUKOCYTE ESTERASE UR QL STRIP.AUTO: NEGATIVE
MCH RBC QN AUTO: 28.1 PG (ref 24–34)
MCHC RBC AUTO-ENTMCNC: 34.1 G/DL (ref 31–37)
MCV RBC AUTO: 82.5 FL (ref 74–97)
NITRITE UR QL STRIP.AUTO: NEGATIVE
P-R INTERVAL, ECG05: 158 MS
PH UR STRIP: 5 [PH] (ref 5–8)
PLATELET # BLD AUTO: 267 K/UL (ref 135–420)
PMV BLD AUTO: 8.8 FL (ref 9.2–11.8)
POTASSIUM SERPL-SCNC: 3.6 MMOL/L (ref 3.5–5.5)
PROT UR STRIP-MCNC: NEGATIVE MG/DL
Q-T INTERVAL, ECG07: 312 MS
QRS DURATION, ECG06: 78 MS
QTC CALCULATION (BEZET), ECG08: 438 MS
RBC # BLD AUTO: 3.88 M/UL (ref 4.7–5.5)
SODIUM SERPL-SCNC: 131 MMOL/L (ref 136–145)
SP GR UR REFRACTOMETRY: 1.01 (ref 1–1.03)
UROBILINOGEN UR QL STRIP.AUTO: 0.2 EU/DL (ref 0.2–1)
VENTRICULAR RATE, ECG03: 119 BPM
WBC # BLD AUTO: 11.4 K/UL (ref 4.6–13.2)

## 2020-04-10 PROCEDURE — 74011000250 HC RX REV CODE- 250: Performed by: FAMILY MEDICINE

## 2020-04-10 PROCEDURE — 81003 URINALYSIS AUTO W/O SCOPE: CPT

## 2020-04-10 PROCEDURE — 94640 AIRWAY INHALATION TREATMENT: CPT

## 2020-04-10 PROCEDURE — 77030029184 HC NEB SOLO AERONEB AEPM -A

## 2020-04-10 PROCEDURE — 74011250637 HC RX REV CODE- 250/637: Performed by: FAMILY MEDICINE

## 2020-04-10 PROCEDURE — 74011000258 HC RX REV CODE- 258: Performed by: HOSPITALIST

## 2020-04-10 PROCEDURE — 65660000000 HC RM CCU STEPDOWN

## 2020-04-10 PROCEDURE — 74011250637 HC RX REV CODE- 250/637: Performed by: HOSPITALIST

## 2020-04-10 PROCEDURE — 74011250636 HC RX REV CODE- 250/636: Performed by: HOSPITALIST

## 2020-04-10 PROCEDURE — 74011636637 HC RX REV CODE- 636/637: Performed by: FAMILY MEDICINE

## 2020-04-10 PROCEDURE — 85027 COMPLETE CBC AUTOMATED: CPT

## 2020-04-10 PROCEDURE — 80048 BASIC METABOLIC PNL TOTAL CA: CPT

## 2020-04-10 PROCEDURE — 36415 COLL VENOUS BLD VENIPUNCTURE: CPT

## 2020-04-10 PROCEDURE — 74011250636 HC RX REV CODE- 250/636: Performed by: FAMILY MEDICINE

## 2020-04-10 PROCEDURE — 77010033711 HC HIGH FLOW OXYGEN

## 2020-04-10 PROCEDURE — 94760 N-INVAS EAR/PLS OXIMETRY 1: CPT

## 2020-04-10 PROCEDURE — 82962 GLUCOSE BLOOD TEST: CPT

## 2020-04-10 RX ORDER — DIAPER,BRIEF,INFANT-TODD,DISP
EACH MISCELLANEOUS 2 TIMES DAILY
Status: DISCONTINUED | OUTPATIENT
Start: 2020-04-10 | End: 2020-04-10

## 2020-04-10 RX ORDER — LEVOFLOXACIN 5 MG/ML
500 INJECTION, SOLUTION INTRAVENOUS EVERY 24 HOURS
Status: DISCONTINUED | OUTPATIENT
Start: 2020-04-10 | End: 2020-04-10

## 2020-04-10 RX ORDER — ONDANSETRON 2 MG/ML
4 INJECTION INTRAMUSCULAR; INTRAVENOUS ONCE
Status: DISCONTINUED | OUTPATIENT
Start: 2020-04-10 | End: 2020-04-10

## 2020-04-10 RX ORDER — IPRATROPIUM BROMIDE AND ALBUTEROL SULFATE 2.5; .5 MG/3ML; MG/3ML
3 SOLUTION RESPIRATORY (INHALATION)
Status: DISCONTINUED | OUTPATIENT
Start: 2020-04-10 | End: 2020-04-11

## 2020-04-10 RX ORDER — TAMSULOSIN HYDROCHLORIDE 0.4 MG/1
0.4 CAPSULE ORAL EVERY EVENING
Status: DISCONTINUED | OUTPATIENT
Start: 2020-04-10 | End: 2020-04-14 | Stop reason: HOSPADM

## 2020-04-10 RX ORDER — DILTIAZEM HYDROCHLORIDE 30 MG/1
30 TABLET, FILM COATED ORAL
Status: DISCONTINUED | OUTPATIENT
Start: 2020-04-10 | End: 2020-04-14 | Stop reason: HOSPADM

## 2020-04-10 RX ORDER — BUDESONIDE 0.5 MG/2ML
500 INHALANT ORAL
Status: DISCONTINUED | OUTPATIENT
Start: 2020-04-10 | End: 2020-04-14 | Stop reason: HOSPADM

## 2020-04-10 RX ORDER — HYDROCORTISONE 1 %
CREAM (GRAM) TOPICAL 2 TIMES DAILY
Status: DISCONTINUED | OUTPATIENT
Start: 2020-04-10 | End: 2020-04-14 | Stop reason: HOSPADM

## 2020-04-10 RX ORDER — CHLORTHALIDONE 25 MG/1
25 TABLET ORAL DAILY
Status: DISCONTINUED | OUTPATIENT
Start: 2020-04-10 | End: 2020-04-14 | Stop reason: HOSPADM

## 2020-04-10 RX ORDER — SODIUM CHLORIDE 9 MG/ML
50 INJECTION, SOLUTION INTRAVENOUS CONTINUOUS
Status: DISPENSED | OUTPATIENT
Start: 2020-04-10 | End: 2020-04-11

## 2020-04-10 RX ADMIN — SODIUM CHLORIDE 50 ML/HR: 900 INJECTION, SOLUTION INTRAVENOUS at 16:12

## 2020-04-10 RX ADMIN — BUDESONIDE 500 MCG: 0.5 INHALANT RESPIRATORY (INHALATION) at 07:31

## 2020-04-10 RX ADMIN — Medication 2 SPRAY: at 22:21

## 2020-04-10 RX ADMIN — IPRATROPIUM BROMIDE AND ALBUTEROL SULFATE 3 ML: .5; 3 SOLUTION RESPIRATORY (INHALATION) at 21:06

## 2020-04-10 RX ADMIN — HEPARIN SODIUM 5000 UNITS: 5000 INJECTION INTRAVENOUS; SUBCUTANEOUS at 13:26

## 2020-04-10 RX ADMIN — HYDROCORTISONE: 1 CREAM TOPICAL at 16:12

## 2020-04-10 RX ADMIN — DILTIAZEM HYDROCHLORIDE 30 MG: 30 TABLET, FILM COATED ORAL at 16:13

## 2020-04-10 RX ADMIN — HYDROCORTISONE: 1 CREAM TOPICAL at 22:20

## 2020-04-10 RX ADMIN — BUDESONIDE 500 MCG: 0.5 INHALANT RESPIRATORY (INHALATION) at 20:58

## 2020-04-10 RX ADMIN — INSULIN LISPRO 2 UNITS: 100 INJECTION, SOLUTION INTRAVENOUS; SUBCUTANEOUS at 22:19

## 2020-04-10 RX ADMIN — TAMSULOSIN HYDROCHLORIDE 0.4 MG: 0.4 CAPSULE ORAL at 17:30

## 2020-04-10 RX ADMIN — DOXYCYCLINE 100 MG: 100 INJECTION, POWDER, LYOPHILIZED, FOR SOLUTION INTRAVENOUS at 16:12

## 2020-04-10 RX ADMIN — Medication 10 ML: at 13:28

## 2020-04-10 RX ADMIN — METHYLPREDNISOLONE SODIUM SUCCINATE 60 MG: 125 INJECTION, POWDER, FOR SOLUTION INTRAMUSCULAR; INTRAVENOUS at 17:30

## 2020-04-10 RX ADMIN — CHLORTHALIDONE 25 MG: 25 TABLET ORAL at 08:29

## 2020-04-10 RX ADMIN — METHYLPREDNISOLONE SODIUM SUCCINATE 60 MG: 125 INJECTION, POWDER, FOR SOLUTION INTRAMUSCULAR; INTRAVENOUS at 06:04

## 2020-04-10 RX ADMIN — INSULIN LISPRO 2 UNITS: 100 INJECTION, SOLUTION INTRAVENOUS; SUBCUTANEOUS at 16:12

## 2020-04-10 RX ADMIN — HEPARIN SODIUM 5000 UNITS: 5000 INJECTION INTRAVENOUS; SUBCUTANEOUS at 22:19

## 2020-04-10 RX ADMIN — INSULIN LISPRO 2 UNITS: 100 INJECTION, SOLUTION INTRAVENOUS; SUBCUTANEOUS at 08:29

## 2020-04-10 RX ADMIN — METHYLPREDNISOLONE SODIUM SUCCINATE 60 MG: 125 INJECTION, POWDER, FOR SOLUTION INTRAMUSCULAR; INTRAVENOUS at 13:27

## 2020-04-10 RX ADMIN — Medication 10 ML: at 06:00

## 2020-04-10 RX ADMIN — HEPARIN SODIUM 5000 UNITS: 5000 INJECTION INTRAVENOUS; SUBCUTANEOUS at 06:04

## 2020-04-10 RX ADMIN — METHYLPREDNISOLONE SODIUM SUCCINATE 60 MG: 125 INJECTION, POWDER, FOR SOLUTION INTRAMUSCULAR; INTRAVENOUS at 23:41

## 2020-04-10 NOTE — PROGRESS NOTES
TRANSFER - IN REPORT:    Verbal report received from Roberto Carlos Shoemaker RN (name) on Chris Cardoso  being received from ED (unit) for routine progression of care      Report consisted of patients Situation, Background, Assessment and   Recommendations(SBAR). Information from the following report(s) Kardex and ED Summary was reviewed with the receiving nurse. Opportunity for questions and clarification was provided. Assessment completed upon patients arrival to unit and care assumed.

## 2020-04-10 NOTE — PROGRESS NOTES
0700 Assumed patient care from [address]. Whiteboard updated, bed wheels locked, bed in lowest position, and call bell within reach. 0800 Assessment complete. Patient resting comfortably in bed. No complaint of pain or SOB at this time. Call bell within reach. 1200 Reassessment complete. Patient resting comfortably in bed. No complaint of pain or SOB at this time. Call bell within reach. 1600 Reassessment complete. Patient resting comfortably in bed. No complaint of pain or SOB at this time. Call bell within reach.

## 2020-04-10 NOTE — PROGRESS NOTES
Problem: Chronic Obstructive Pulmonary Disease (COPD)  Goal: *Oxygen saturation during activity within specified parameters  Outcome: Progressing Towards Goal  Goal: *Able to remain out of bed as prescribed  Outcome: Progressing Towards Goal  Goal: *Absence of hypoxia  Outcome: Progressing Towards Goal  Goal: *Optimize nutritional status  Outcome: Progressing Towards Goal     Problem: Hypertension  Goal: *Blood pressure within specified parameters  Outcome: Progressing Towards Goal  Goal: *Fluid volume balance  Outcome: Progressing Towards Goal  Goal: *Labs within defined limits  Outcome: Progressing Towards Goal     Problem: Chronic Renal Failure  Goal: *Fluid and electrolytes stabilized  Outcome: Progressing Towards Goal

## 2020-04-10 NOTE — PROGRESS NOTES
Hospitalist Progress Note-critical care note     Patient: Sedrick Trujillo MRN: [id number]  CSN: [id number]    YOB: 1945  Age: 66 y.o. Sex: male    DOA: 4/9/2020 LOS:  LOS: 1 day            Chief complaint: copd exacerbation, acute on chronic respiratory failure ,paf     Assessment/Plan         Hospital Problems  Date Reviewed: 7/6/2019          Codes Class Noted POA    * (Principal) COPD exacerbation ([facility] [address] [facility] ICD-10-CM: J44.1  ICD-9-CM: 491.21  4/9/2020 Unknown        Acute respiratory failure with hypoxia and hypercarbia [facility]) ICD-10-CM: J96.01, J96.02  ICD-9-CM: 518.81  4/9/2020 Unknown        Paroxysmal atrial fibrillation (HCC) ICD-10-CM: I48.0  ICD-9-CM: 427.31  8/19/2019 Yes        Chronic renal disease, stage 3, moderately decreased glomerular filtration rate between 30-59 mL/min/1.73 square meter (HCC) ICD-10-CM: N18.3  ICD-9-CM: 585.3  7/20/2018 Yes        Hypertension ICD-10-CM: I10  ICD-9-CM: 401.9  Unknown Yes              Acute on chronic respiratory failure with hypercapnia and hypoxia  Was put on high flow O2, continue nc O2   No significant interval change or acute pulmonary process identified.       Acute COPD exacerbation  Continue IV steroid.  Solu-Medrol breathing treatment, add doxy      Asbestosis     Hyponatremia   Low dose ns     PAF   Continue monitor   Optimize electrolytes      Hypertension: will put low dose cardizem      ckd3  Stable     Subjective: sob better     Disposition :tbd,   Review of systems:    General: No fevers or chills. Cardiovascular: No chest pain or pressure. No palpitations. Pulmonary:  shortness of breath. Gastrointestinal: No nausea, vomiting.      Vital signs/Intake and Output:  Visit Vitals  /70 (BP 1 Location: Right arm, BP Patient Position: At rest)   Pulse 83   Temp 98.1 °F (36.7 °C)   Resp 17   Ht 5' 8\" (1.727 m)   Wt 95 kg (209 lb 6.4 oz)   SpO2 100%   BMI 31.84 kg/m²     Current Shift:  04/10 0701 - 04/10 1900  In: 240 [P.O.:240]  Out: -   Last three shifts:  04/08 1901 - 04/10 0700  In: -   Out: 1200 [Urine:1200]    Physical Exam:  General: WD, WN. Alert, cooperative, no acute distress    HEENT: NC, Atraumatic. PERRLA, anicteric sclerae. Lungs:            Decreased bs bilaterally   Heart:  Regular  rhythm,  No murmur, No Rubs, No Gallops  Abdomen: Soft, Non distended, Non tender. +Bowel sounds,   Extremities: No c/c/e  Psych:   Not anxious or agitated. Neurologic:  No acute neurological deficit. Labs: Results:       Chemistry Recent Labs     04/10/20  0230 04/09/20  1945   * 148*   * 132*   K 3.6 3.9   CL 96* 98*   CO2 26 25   BUN 19* 19*   CREA 1.32* 1.34*   CA 8.6 8.6   AGAP 9 9   BUCR 14 14   AP  --  107   TP  --  6.5   ALB  --  3.2*   GLOB  --  3.3   AGRAT  --  1.0      CBC w/Diff Recent Labs     04/10/20  0230 04/09/20  1945   WBC 11.4 12.3   RBC 3.88* 3.89*   HGB 10.9* 10.9*   HCT 32.0* 32.5*    317   GRANS  --  65   LYMPH  --  11*   EOS  --  13*      Cardiac Enzymes Recent Labs     04/09/20 1945      CKND1 3.5      Coagulation No results for input(s): PTP, INR, APTT, INREXT, INREXT in the last 72 hours. Lipid Panel No results found for: CHOL, CHOLPOCT, CHOLX, CHLST, CHOLV, 357528, HDL, HDLP, LDL, LDLC, DLDLP, 038958, VLDLC, VLDL, TGLX, TRIGL, TRIGP, TGLPOCT, CHHD, CHHDX   BNP No results for input(s): BNPP in the last 72 hours.    Liver Enzymes Recent Labs     04/09/20 1945   TP 6.5   ALB 3.2*      SGOT 14      Thyroid Studies Lab Results   Component Value Date/Time    TSH 0.86 10/20/2019 01:05 AM        Procedures/imaging: see electronic medical records for all procedures/Xrays and details which were not copied into this note but were reviewed prior to creation of Plan    Xr Chest Port    Result Date: 4/9/2020  EXAM: One-view chest CLINICAL HISTORY: meets SIRS criteria , COMPARISON: None FINDINGS: Frontal view of the chest demonstrate chronic pleural-based opacities in the right lung, unchanged. Otherwise fairly clear. . Cardiac silhouette is normal in size and contour. No acute bony or soft tissue abnormality. IMPRESSION: No significant interval change or acute pulmonary process identified.        Judi Ding MD

## 2020-04-10 NOTE — H&P
History & Physical    Patient: Livan Stark MRN: [id number]  CSN: [id number]    YOB: 1945  Age: 66 y.o. Sex: male      DOA: 4/9/2020  Primary Care Provider:  Marina Escoto MD      Assessment/Plan   Livan Stark is a 66 y.o. male with PMHX COPD on chronic 2 L of oxygen at home, etc. hospital in moderate respiratory distress and hypoxic. Admitted for acute respiratory failure and COPD exacerbation.      Admit to Med-Surg floor    Acute respiratory failure -  Improved  Continue high flow oxygen  Below management of COPD exacerbation    Acute COPD exacerbation -  Albuterol HFA   No COVID suspicion, so will order budesonide nebs q12hr  Solu-medrol 60mg q6hrs   Budesonide q12  As a result of starting high-dose steroids, will order FSBG qac and qhs with lispro correctional scale as elevated BS are expected   Continue HF 02 for now with continuous pulse oximetry   Did not start abx -no signs of infection -afebrile, no leukocytosis, no lactic acidosis, will monitor   No COVID testing done -pt social distancing, no recent travel, no known exposures  ED obtained blood cultures x2, will follow   CXR -clear     Hypertension -  Monitor BP   Resume home antihypertensives     CKD, Stage III -  Avoid nephrotoxic meds  Daily BMP     PAF -  Eliquis was DC because pt cannot afford it   CM consult for possible resources to pay for meds     DVT Prophylaxis -Heparin       Patient Active Problem List   Diagnosis Code    Hypertension I10    COPD (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 ([facility]) J44.9    Chronic renal disease, stage 3, moderately decreased glomerular filtration rate between 30-59 mL/min/1.73 square meter (HCC) N18.3    Asbestosis ([facility] [address] J61    Acute exacerbation of chronic obstructive pulmonary disease (COPD) ([facility] J44.1    Debility R53.81    Paroxysmal atrial fibrillation (HCC) I48.0    Chronic respiratory failure with hypoxia ([facility]) J96.11    Tobacco dependence F17.200  Pleural effusion, right J90    COPD exacerbation ([facility]) J44.1    Acute respiratory failure with hypoxia and hypercarbia ([facility]) J96.01, J96.02       Estimated length of stay : 2-3 days    CC: SOB       HPI:     Vivian La is a 66 y.o. male with past medical h/o COPD on chronic 2 L of oxygen at home, hypertension, CKD Stage III, Asbestosis, PAF (was on eliquis, but discontinued), and tobacco dependence presents to the emergency department via EMS in respiratory distress. EMS reports that when they arrived patient was attempting to give himself a nebulizer treatment and stated that it was not helping. Patient was 90% to 93% on room air. Was transported on 4 L and saturating in the high 90s. Pt reports that he has been staying home with his wife and practicing social distancing. No recent travel. He has had no known COVID exposures or risk factors. Past Medical History:   Diagnosis Date    Brain aneurysm     Cancer [facility])     prostate    COPD (chronic obstructive pulmonary disease) ([facility])     Hypertension     Nocturia     Pneumonia     Radiation effect        Past Surgical History:   Procedure Laterality Date    HX HERNIA REPAIR         Family History   Problem Relation Age of Onset    Heart Disease Mother        Social History     Socioeconomic History    Marital status:      Spouse name: Not on file    Number of children: Not on file    Years of education: Not on file    Highest education level: Not on file   Tobacco Use    Smoking status: Former Smoker     Types: Cigarettes    Smokeless tobacco: Never Used   Substance and Sexual Activity    Alcohol use: No    Drug use: Never       Prior to Admission medications    Medication Sig Start Date End Date Taking? Authorizing Provider   predniSONE (STERAPRED DS) 10 mg dose pack See administration instruction per 10mg dose pack 2/25/20   Katherin Mccoy MD   chlorthalidone (HYGROTEN) 25 mg tablet Take 25 mg by mouth daily.     Other, MD Blayne   acetaminophen (TYLENOL) 325 mg tablet Take 500 mg by mouth every four (4) hours as needed for Pain. Provider, Historical   OXYGEN-AIR DELIVERY SYSTEMS Take 2 L by inhalation continuous. Provider, Historical   dextran 70/hypromellose (ARTIFICIAL TEARS, PF, OP) Apply 2 Drops to eye as needed for Other (both eyes for dryness). Provider, Historical   diphenhydrAMINE (BENADRYL) 25 mg capsule Take 25 mg by mouth nightly as needed for Itching. Provider, Historical   albuterol-ipratropium (DUO-NEB) 2.5 mg-0.5 mg/3 ml nebu 3 mL by Nebulization route every four (4) hours as needed. Patient taking differently: 3 mL by Nebulization route every four (4) hours as needed for Other (Shortness of breath). 2/9/19   Jeremi Reaves DO   albuterol (PROVENTIL HFA, VENTOLIN HFA, PROAIR HFA) 90 mcg/actuation inhaler Take 2 Puffs by inhalation every four (4) hours as needed for Wheezing or Shortness of Breath. 4/23/18   Ana Gupta PA-C   ipratropium (ATROVENT) 0.03 % nasal spray 2 Sprays by Both Nostrils route every twelve (12) hours as needed for Rhinitis. Shant, MD Blayne   tamsulosin (FLOMAX) 0.4 mg capsule Take 0.4 mg by mouth every evening. Blayne Bran MD       Allergies   Allergen Reactions    Aspirin Other (comments)     \"messes my stomach up\"       Review of Systems  Gen: No fever, chills, malaise, weight loss/gain. Heent: No headache, rhinorrhea, epistaxis, ear pain, hearing loss, sinus pain, neck pain/stiffness, sore throat. Heart: No chest pain, palpitations, KUMAR, pnd, or orthopnea. Resp: +cough, shortness of breath and wheezing. No hemoptysis  GI: No nausea, vomiting, diarrhea, constipation, melena or hematochezia. : No urinary obstruction, dysuria or hematuria. Derm: No rash, new skin lesion or pruritis. Musc/skeletal: no bone or joint complains. Vasc: +lower extremity pitting edema, no cyanosis or claudication.    Endo: No heat/cold intolerance, no polyuria,polydipsia or polyphagia. Neuro: No unilateral weakness, numbness, tingling. No seizures. Heme: No easy bruising or bleeding. Physical Exam:     Physical Exam:  Visit Vitals  /77   Pulse (!) 110   Resp 24   Ht 5' 8\" (1.727 m)   Wt 90.7 kg (200 lb)   SpO2 100%   BMI 30.41 kg/m²    O2 Flow Rate (L/min): 4 l/min O2 Device: Nasal cannula    No data recorded. No intake/output data recorded. No intake/output data recorded. General:  Awake, cooperative, no distress. Head:  Normocephalic, without obvious abnormality, atraumatic. Eyes:  Conjunctivae/corneas clear, sclera anicteric, PERRL, EOMs intact. Nose: Nares normal. No drainage or sinus tenderness. Throat: Lips, mucosa, and tongue normal.    Neck: Supple, symmetrical, trachea midline, no adenopathy. Lungs:   Decreased breath sounds        Heart:  Regular rate and rhythm, S1, S2 normal, no murmur, click, rub or gallop. Abdomen: Soft, non-tender. Bowel sounds normal. No masses,  No organomegaly. Extremities: Extremities normal, atraumatic, no cyanosis or edema. Capillary refill normal.   Pulses: 2+ and symmetric all extremities. Skin: Skin color appropriate for ethnicity, turgor normal. No rashes or lesions   Neurologic: CNII-XII intact. No focal motor or sensory deficit.        Labs Reviewed:  Recent Results (from the past 24 hour(s))   EKG, 12 LEAD, INITIAL    Collection Time: 04/09/20  7:41 PM   Result Value Ref Range    Ventricular Rate 119 BPM    Atrial Rate 119 BPM    P-R Interval 158 ms    QRS Duration 78 ms    Q-T Interval 312 ms    QTC Calculation (Bezet) 438 ms    Calculated P Axis 115 degrees    Calculated R Axis 26 degrees    Calculated T Axis 79 degrees    Diagnosis       Sinus tachycardia with frequent premature ventricular complexes  Otherwise normal ECG  When compared with ECG of 21-FEB-2020 18:15,  fusion complexes are no longer present     LACTIC ACID    Collection Time: 04/09/20  7:45 PM   Result Value Ref Range Lactic acid 1.1 0.4 - 2.0 MMOL/L   METABOLIC PANEL, COMPREHENSIVE    Collection Time: 04/09/20  7:45 PM   Result Value Ref Range    Sodium 132 (L) 136 - 145 mmol/L    Potassium 3.9 3.5 - 5.5 mmol/L    Chloride 98 (L) 100 - 111 mmol/L    CO2 25 21 - 32 mmol/L    Anion gap 9 3.0 - 18 mmol/L    Glucose 148 (H) 74 - 99 mg/dL    BUN 19 (H) 7.0 - 18 MG/DL    Creatinine 1.34 (H) 0.6 - 1.3 MG/DL    BUN/Creatinine ratio 14 12 - 20      GFR est AA >60 >60 ml/min/1.73m2    GFR est non-AA 52 (L) >60 ml/min/1.73m2    Calcium 8.6 8.5 - 10.1 MG/DL    Bilirubin, total 0.3 0.2 - 1.0 MG/DL    ALT (SGPT) 15 (L) 16 - 61 U/L    AST (SGOT) 14 10 - 38 U/L    Alk. phosphatase 107 45 - 117 U/L    Protein, total 6.5 6.4 - 8.2 g/dL    Albumin 3.2 (L) 3.4 - 5.0 g/dL    Globulin 3.3 2.0 - 4.0 g/dL    A-G Ratio 1.0 0.8 - 1.7     CBC WITH AUTOMATED DIFF    Collection Time: 04/09/20  7:45 PM   Result Value Ref Range    WBC 12.3 4.6 - 13.2 K/uL    RBC 3.89 (L) 4.70 - 5.50 M/uL    HGB 10.9 (L) 13.0 - 16.0 g/dL    HCT 32.5 (L) 36.0 - 48.0 %    MCV 83.5 74.0 - 97.0 FL    MCH 28.0 24.0 - 34.0 PG    MCHC 33.5 31.0 - 37.0 g/dL    RDW 14.4 11.6 - 14.5 %    PLATELET 989 498 - 109 K/uL    MPV 9.1 (L) 9.2 - 11.8 FL    NEUTROPHILS 65 40 - 73 %    LYMPHOCYTES 11 (L) 21 - 52 %    MONOCYTES 10 3 - 10 %    EOSINOPHILS 13 (H) 0 - 5 %    BASOPHILS 1 0 - 2 %    ABS. NEUTROPHILS 7.9 1.8 - 8.0 K/UL    ABS. LYMPHOCYTES 1.4 0.9 - 3.6 K/UL    ABS. MONOCYTES 1.3 (H) 0.05 - 1.2 K/UL    ABS. EOSINOPHILS 1.6 (H) 0.0 - 0.4 K/UL    ABS.  BASOPHILS 0.1 0.0 - 0.1 K/UL    DF AUTOMATED     MAGNESIUM    Collection Time: 04/09/20  7:45 PM   Result Value Ref Range    Magnesium 2.1 1.6 - 2.6 mg/dL   CARDIAC PANEL,(CK, CKMB & TROPONIN)    Collection Time: 04/09/20  7:45 PM   Result Value Ref Range     39 - 308 U/L    CK - MB 3.6 (H) <3.6 ng/ml    CK-MB Index 3.5 0.0 - 4.0 %    Troponin-I, QT <0.02 0.0 - 0.045 NG/ML   NT-PRO BNP    Collection Time: 04/09/20  7:45 PM   Result Value Ref Range    NT pro- 0 - 1,800 PG/ML       Procedures/imaging: see electronic medical records for all procedures/Xrays and details which were not copied into this note but were reviewed prior to creation of Plan      CC: Greer Downey MD

## 2020-04-10 NOTE — ED NOTES
TRANSFER - OUT REPORT:    Verbal report given to COLLEEN D. [facility] (name) on Bro Dyerbus  being transferred to [facility] (unit) for routine progression of care       Report consisted of patients Situation, Background, Assessment and   Recommendations(SBAR). Information from the following report(s) SBAR, ED Summary, Procedure Summary, MAR and Med Rec Status was reviewed with the receiving nurse. Lines:   Peripheral IV 04/09/20 Left Antecubital (Active)   Site Assessment Clean, dry, & intact 4/9/2020  8:08 PM   Phlebitis Assessment 0 4/9/2020  8:08 PM   Infiltration Assessment 0 4/9/2020  8:08 PM   Dressing Status Clean, dry, & intact 4/9/2020  8:08 PM   Dressing Type Transparent 4/9/2020  8:08 PM   Hub Color/Line Status Pink;Patent 4/9/2020  8:08 PM   Action Taken Blood drawn 4/9/2020  8:08 PM        Opportunity for questions and clarification was provided.       Patient transported with:   Monitor  Registered Nurse

## 2020-04-10 NOTE — PROGRESS NOTES
DC Plan: TBD, will need PT/OT eval orders to assist with dc planning    Chart reviewed. Pt admitted to tele by hospitalist. Spoke with pt on phone regarding dc plan, not entering patient rooms due to covid policies. CM role explained, pt states he is familiar with CM role due to previous admissions. Pt lives with wife and son. Home address confirmed per face sheet. Pt states one of his family members will drive home at  Discharge. Pt states he drives short distances, but usually has family drive him places. Pt has cane, RW, home oxygen (thru [address] Denies being active with HH, but has used them in past, uncertain of agency. Pt states he also has [facility] in past. Discussed need for either [facility] or rehab and he agrees to either. FOC offered. Pts PCP confirmed as MD Downey. Pt states he also sees MD Courtney Romeo. CM will cont to follow for transition of care needs [phone]    Reason for Admission:   Per H&P, pt is \"is a 66 y. o. male with PMHX COPD on chronic 2 L of oxygen at home, etc. hospital in moderate respiratory distress and hypoxic. Admitted for acute respiratory failure and COPD exacerbation. \"               RUR Score:     High 30    PCP: First and Last name: MD Downey   Name of Practice:   Are you a current patient: Yes/No:   Approximate date of last visit:              Resources/supports as identified by patient/family:                   Top Challenges facing patient (as identified by patient/family and CM): Finances/Medication cost?    Affording specialist copays, pulmonology                Transportation? Family              Support system or lack thereof? Has family                       Living arrangements? Wife and son           Self-care/ADLs/Cognition? Uses cane/DME, alert and oriented          Current Advanced Directive/Advance Care Plan:   On file                          Plan for utilizing home health:    [facility] v SNF                 Transition of Care Plan: TBD             Care Management Interventions  PCP Verified by CM: Yes  Mode of Transport at Discharge:  Other (see comment)(family)  Transition of Care Consult (CM Consult): Discharge Planning  Current Support Network: Lives with Spouse  Confirm Follow Up Transport: Family

## 2020-04-11 LAB
ANION GAP SERPL CALC-SCNC: 7 MMOL/L (ref 3–18)
BUN SERPL-MCNC: 32 MG/DL (ref 7–18)
BUN/CREAT SERPL: 27 (ref 12–20)
CALCIUM SERPL-MCNC: 8 MG/DL (ref 8.5–10.1)
CHLORIDE SERPL-SCNC: 101 MMOL/L (ref 100–111)
CO2 SERPL-SCNC: 26 MMOL/L (ref 21–32)
CREAT SERPL-MCNC: 1.17 MG/DL (ref 0.6–1.3)
ERYTHROCYTE [DISTWIDTH] IN BLOOD BY AUTOMATED COUNT: 14.2 % (ref 11.6–14.5)
GLUCOSE BLD STRIP.AUTO-MCNC: 131 MG/DL (ref 70–110)
GLUCOSE BLD STRIP.AUTO-MCNC: 151 MG/DL (ref 70–110)
GLUCOSE BLD STRIP.AUTO-MCNC: 193 MG/DL (ref 70–110)
GLUCOSE BLD STRIP.AUTO-MCNC: 213 MG/DL (ref 70–110)
GLUCOSE SERPL-MCNC: 145 MG/DL (ref 74–99)
HCT VFR BLD AUTO: 30 % (ref 36–48)
HGB BLD-MCNC: 10 G/DL (ref 13–16)
MCH RBC QN AUTO: 28 PG (ref 24–34)
MCHC RBC AUTO-ENTMCNC: 33.3 G/DL (ref 31–37)
MCV RBC AUTO: 84 FL (ref 74–97)
PLATELET # BLD AUTO: 301 K/UL (ref 135–420)
PMV BLD AUTO: 9 FL (ref 9.2–11.8)
POTASSIUM SERPL-SCNC: 3.9 MMOL/L (ref 3.5–5.5)
RBC # BLD AUTO: 3.57 M/UL (ref 4.7–5.5)
SODIUM SERPL-SCNC: 134 MMOL/L (ref 136–145)
WBC # BLD AUTO: 13.8 K/UL (ref 4.6–13.2)

## 2020-04-11 PROCEDURE — 74011250637 HC RX REV CODE- 250/637: Performed by: HOSPITALIST

## 2020-04-11 PROCEDURE — 97162 PT EVAL MOD COMPLEX 30 MIN: CPT

## 2020-04-11 PROCEDURE — 97116 GAIT TRAINING THERAPY: CPT

## 2020-04-11 PROCEDURE — 82962 GLUCOSE BLOOD TEST: CPT

## 2020-04-11 PROCEDURE — 77010033678 HC OXYGEN DAILY

## 2020-04-11 PROCEDURE — 74011636637 HC RX REV CODE- 636/637: Performed by: FAMILY MEDICINE

## 2020-04-11 PROCEDURE — 97166 OT EVAL MOD COMPLEX 45 MIN: CPT

## 2020-04-11 PROCEDURE — 85027 COMPLETE CBC AUTOMATED: CPT

## 2020-04-11 PROCEDURE — 36415 COLL VENOUS BLD VENIPUNCTURE: CPT

## 2020-04-11 PROCEDURE — 94760 N-INVAS EAR/PLS OXIMETRY 1: CPT

## 2020-04-11 PROCEDURE — 94640 AIRWAY INHALATION TREATMENT: CPT

## 2020-04-11 PROCEDURE — 65660000000 HC RM CCU STEPDOWN

## 2020-04-11 PROCEDURE — 74011000250 HC RX REV CODE- 250: Performed by: HOSPITALIST

## 2020-04-11 PROCEDURE — 80048 BASIC METABOLIC PNL TOTAL CA: CPT

## 2020-04-11 PROCEDURE — 74011250637 HC RX REV CODE- 250/637: Performed by: FAMILY MEDICINE

## 2020-04-11 PROCEDURE — 74011000258 HC RX REV CODE- 258: Performed by: HOSPITALIST

## 2020-04-11 PROCEDURE — 74011250636 HC RX REV CODE- 250/636: Performed by: HOSPITALIST

## 2020-04-11 PROCEDURE — 97535 SELF CARE MNGMENT TRAINING: CPT

## 2020-04-11 PROCEDURE — 74011000250 HC RX REV CODE- 250: Performed by: FAMILY MEDICINE

## 2020-04-11 PROCEDURE — 74011250636 HC RX REV CODE- 250/636: Performed by: FAMILY MEDICINE

## 2020-04-11 RX ORDER — GUAIFENESIN 600 MG/1
600 TABLET, EXTENDED RELEASE ORAL EVERY 12 HOURS
Status: DISCONTINUED | OUTPATIENT
Start: 2020-04-11 | End: 2020-04-14 | Stop reason: HOSPADM

## 2020-04-11 RX ORDER — IPRATROPIUM BROMIDE AND ALBUTEROL SULFATE 2.5; .5 MG/3ML; MG/3ML
3 SOLUTION RESPIRATORY (INHALATION)
Status: DISCONTINUED | OUTPATIENT
Start: 2020-04-11 | End: 2020-04-14 | Stop reason: HOSPADM

## 2020-04-11 RX ADMIN — METHYLPREDNISOLONE SODIUM SUCCINATE 60 MG: 125 INJECTION, POWDER, FOR SOLUTION INTRAMUSCULAR; INTRAVENOUS at 06:54

## 2020-04-11 RX ADMIN — HEPARIN SODIUM 5000 UNITS: 5000 INJECTION INTRAVENOUS; SUBCUTANEOUS at 13:17

## 2020-04-11 RX ADMIN — TAMSULOSIN HYDROCHLORIDE 0.4 MG: 0.4 CAPSULE ORAL at 17:49

## 2020-04-11 RX ADMIN — INSULIN LISPRO 4 UNITS: 100 INJECTION, SOLUTION INTRAVENOUS; SUBCUTANEOUS at 12:59

## 2020-04-11 RX ADMIN — IPRATROPIUM BROMIDE AND ALBUTEROL SULFATE 3 ML: .5; 3 SOLUTION RESPIRATORY (INHALATION) at 07:16

## 2020-04-11 RX ADMIN — METHYLPREDNISOLONE SODIUM SUCCINATE 60 MG: 125 INJECTION, POWDER, FOR SOLUTION INTRAMUSCULAR; INTRAVENOUS at 12:57

## 2020-04-11 RX ADMIN — IPRATROPIUM BROMIDE AND ALBUTEROL SULFATE 3 ML: .5; 3 SOLUTION RESPIRATORY (INHALATION) at 20:25

## 2020-04-11 RX ADMIN — CHLORTHALIDONE 25 MG: 25 TABLET ORAL at 09:53

## 2020-04-11 RX ADMIN — GUAIFENESIN 600 MG: 600 TABLET, EXTENDED RELEASE ORAL at 15:23

## 2020-04-11 RX ADMIN — HEPARIN SODIUM 5000 UNITS: 5000 INJECTION INTRAVENOUS; SUBCUTANEOUS at 06:54

## 2020-04-11 RX ADMIN — DILTIAZEM HYDROCHLORIDE 30 MG: 30 TABLET, FILM COATED ORAL at 06:54

## 2020-04-11 RX ADMIN — IPRATROPIUM BROMIDE AND ALBUTEROL SULFATE 3 ML: .5; 3 SOLUTION RESPIRATORY (INHALATION) at 10:37

## 2020-04-11 RX ADMIN — IPRATROPIUM BROMIDE AND ALBUTEROL SULFATE 3 ML: .5; 3 SOLUTION RESPIRATORY (INHALATION) at 15:04

## 2020-04-11 RX ADMIN — DOXYCYCLINE 100 MG: 100 INJECTION, POWDER, LYOPHILIZED, FOR SOLUTION INTRAVENOUS at 15:26

## 2020-04-11 RX ADMIN — GUAIFENESIN 600 MG: 600 TABLET, EXTENDED RELEASE ORAL at 22:13

## 2020-04-11 RX ADMIN — INSULIN LISPRO 2 UNITS: 100 INJECTION, SOLUTION INTRAVENOUS; SUBCUTANEOUS at 06:55

## 2020-04-11 RX ADMIN — DOXYCYCLINE 100 MG: 100 INJECTION, POWDER, LYOPHILIZED, FOR SOLUTION INTRAVENOUS at 03:32

## 2020-04-11 RX ADMIN — ACETAMINOPHEN 650 MG: 325 TABLET ORAL at 13:17

## 2020-04-11 RX ADMIN — DILTIAZEM HYDROCHLORIDE 30 MG: 30 TABLET, FILM COATED ORAL at 17:49

## 2020-04-11 RX ADMIN — HYDROCORTISONE: 1 CREAM TOPICAL at 22:16

## 2020-04-11 RX ADMIN — INSULIN LISPRO 2 UNITS: 100 INJECTION, SOLUTION INTRAVENOUS; SUBCUTANEOUS at 22:13

## 2020-04-11 RX ADMIN — IPRATROPIUM BROMIDE AND ALBUTEROL SULFATE 3 ML: .5; 3 SOLUTION RESPIRATORY (INHALATION) at 03:40

## 2020-04-11 RX ADMIN — Medication 10 ML: at 13:18

## 2020-04-11 RX ADMIN — DILTIAZEM HYDROCHLORIDE 30 MG: 30 TABLET, FILM COATED ORAL at 12:56

## 2020-04-11 RX ADMIN — METHYLPREDNISOLONE SODIUM SUCCINATE 60 MG: 125 INJECTION, POWDER, FOR SOLUTION INTRAMUSCULAR; INTRAVENOUS at 17:49

## 2020-04-11 RX ADMIN — HEPARIN SODIUM 5000 UNITS: 5000 INJECTION INTRAVENOUS; SUBCUTANEOUS at 22:13

## 2020-04-11 RX ADMIN — BUDESONIDE 500 MCG: 0.5 INHALANT RESPIRATORY (INHALATION) at 20:25

## 2020-04-11 RX ADMIN — BUDESONIDE 500 MCG: 0.5 INHALANT RESPIRATORY (INHALATION) at 07:16

## 2020-04-11 RX ADMIN — Medication 10 ML: at 22:16

## 2020-04-11 RX ADMIN — HYDROCORTISONE: 1 CREAM TOPICAL at 09:53

## 2020-04-11 NOTE — PROGRESS NOTES
Problem: Mobility Impaired (Adult and Pediatric)  Goal: *Acute Goals and Plan of Care (Insert Text)  Description: Physical Therapy Goals   Initiated 4/11/2020 and to be accomplished within 4 day(s)  1. Patient will move from supine <> sit with S in prep for out of bed activity and change of position. 2.  Patient will perform sit<> stand with S with LRAD in prep for transfers/ambulation. 3.  Patient will transfer from bed <> chair with S with LRAD for time up in chair for completion of ADL activity. 4.  Patient will ambulate 150 feet with LRAD/S for improved functional mobility/safe discharge. 5.  Patient will ascend/descend 3-5 stairs with handrail(s) with minimal assistance/contact guard assist for home re-entry as needed. Outcome: Progressing Towards Goal  PHYSICAL THERAPY EVALUATION    Patient: Zehra Martins (88 y.o. male)  Date: 4/11/2020  Primary Diagnosis: COPD exacerbation (HCC) [J44.1]  Acute respiratory failure with hypoxia and hypercarbia (HCC) [J96.01, J96.02]  Precautions: Fall    ASSESSMENT :  Based on the objective data described below, the patient is a70 yo M seen on telemetry unit. Pt found seated EOB. Reports amb with RW PTA and L knee pain 1/10 currently. Pt presents with LE weakness,decreased independence in functional mobility with regard to bed mobility/transfers, gait quality and decreased activity tolerance due to COPD (SOB normal per pt report). Pt transfers to stand with min/CGA and participated in GT/RW/CGA 28ft. Lisbet slow, mildly antalgic with step to/reciprocal gt pattern. Noted wheezing throughout activity with O2 in place. O2 sat mid 90's or better. Pt left seated EOB with all needs in reach, and nurse Nancy Jimenez notified. Recommend HHPT or SNF for follow up physical therapy upon discharge (pending progress) to reach maximal level of independence/safety with functional mobility.      Pt Education: Role of physical therapy in acute care setting, fall prevention and safety/technique during functional mobility tasks      Patient will benefit from skilled intervention to address the above impairments. Patients rehabilitation potential is considered to be Fair  Factors which may influence rehabilitation potential include:   []         None noted  []         Mental ability/status  [x]         Medical condition  []         Home/family situation and support systems  []         Safety awareness  []         Pain tolerance/management  []         Other:      PLAN :  Recommendations and Planned Interventions:  [x]           Bed Mobility Training             []    Neuromuscular Re-Education  [x]           Transfer Training                   []    Orthotic/Prosthetic Training  [x]           Gait Training                          []    Modalities  [x]           Therapeutic Exercises          []    Edema Management/Control  [x]           Therapeutic Activities            [x]    Patient and Family Training/Education  []           Other (comment):    Frequency/Duration: Patient will be followed by physical therapy 1-2 times per day to address goals. Discharge Recommendations: [facility]  Further Equipment Recommendations for Discharge: N/A     SUBJECTIVE:   Patient stated My knee was hurting but I took some Tylenol and it feels a little better.     OBJECTIVE DATA SUMMARY:     Past Medical History:   Diagnosis Date    Brain aneurysm     Cancer ([facility] [address]     prostate    [facility]ronic [email or web address]     Hypertension     Nocturia     Pneumonia     Radiation effect      Past Surgical History:   Procedure Laterality Date    HX HERNIA REPAIR       Barriers to Learning/Limitations: None  Compensate with: N/A  Prior Level of Function/Home Situation: amb with RW PTA; c/o L knee pain off and on due to arthritis  Home Situation  Home Environment: Trailer/mobile home  # Steps to Enter: 6  Rails to Enter: Yes  Hand Rails : Bilateral  One/Two Story Residence: One story  Living Alone: No  Support Systems: Spouse/Significant Other/Partner, Child(prashant)  Patient Expects to be Discharged to[de-identified] Trailer/mobile home  Current DME Used/Available at Home: Cane, straight, Walker, rolling(home O2 at 2 Lnc-not portable)  Tub or Shower Type: Tub/Shower combination  Critical Behavior:  Neurologic State: Alert; Appropriate for age  Orientation Level: Oriented X4  Cognition: Appropriate for age attention/concentration; Follows commands  Safety/Judgement: Fall prevention  Psychosocial  Patient Behaviors: Calm; Cooperative  Purposeful Interaction: Yes  Pt Identified Daily Priority: Clinical issues (comment)  [place] Process: Teaching/learning  Caring Interventions: Reassure; Therapeutic modalities  Reassure: Therapeutic listening; Informing  Therapeutic Modalities: Humor; Intentional therapeutic touch  Strength:    Strength: Generally decreased, functional  Tone & Sensation:   Tone: Normal  Sensation: Intact(but reports numbness bilat feet for some years)  Range Of Motion:  AROM: Generally decreased, functional  Functional Mobility:  Transfers:  Sit to Stand: Minimum assistance;Contact guard assistance(bed elev slightly)  Stand to Sit: Contact guard assistance  Balance:   Sitting: Intact  Standing: Impaired; With support  Standing - Static: Fair  Standing - Dynamic : Fair  Ambulation/Gait Training:  Distance (ft): 28 Feet (ft)  Assistive Device: Gait belt;Walker, rolling  Ambulation - Level of Assistance: Contact guard assistance(with O2 at 2 Lnc on)  Gait Description (WDL): Exceptions to WDL  Gait Abnormalities: Decreased step clearance; Step to gait(to reciprocal gt)  Speed/Lisbet: Slow;Pace decreased (<100 feet/min)  Step Length: Right shortened;Left shortened  Interventions: Safety awareness training;Verbal cues  Therapeutic Exercises:   LAQ 5x5sed hold L LE  Pain:  Pain Scale 1: Numeric (0 - 10)  Pain Intensity 1: 1  Pain Location 1: Knee  Pain Orientation 1: Left  Pain Description 1: Aching  Pain Intervention(s) 1: Medication (see MAR)  Activity Tolerance:   Fair   Please refer to the flowsheet for vital signs taken during this treatment. After treatment:   [x]         Patient left in no apparent distress sitting up EOB  []         Patient left in no apparent distress in bed  [x]         Call bell left within reach  [x]         Nursing notified  []         Caregiver present  []         Bed alarm activated    COMMUNICATION/EDUCATION:   [x]         Fall prevention education was provided and the patient/caregiver indicated understanding. [x]         Patient/family have participated as able in goal setting and plan of care. [x]         Patient/family agree to work toward stated goals and plan of care. []         Patient understands intent and goals of therapy, but is neutral about his/her participation. []         Patient is unable to participate in goal setting and plan of care.     Eval Complexity: History: HIGH Complexity :3+ comorbidities / personal factors will impact the outcome/ POC Exam:MEDIUM Complexity : 3 Standardized tests and measures addressing body structure, function, activity limitation and / or participation in recreation  Presentation: MEDIUM Complexity : Evolving with changing characteristics  Clinical Decision Making:Medium Complexity  Overall Complexity:MEDIUM    Thank you for this referral.  Douglas Yarbrough, PT   Time Calculation: 26 mins

## 2020-04-11 NOTE — PROGRESS NOTES
4/11/2020 PT note: consult received and chart reviewed. Spoke with Dr Bon Gentile who just saw pt. Physician reports pt needing breathing treatment at this time. Will f/u for PT evaluation post breathing treatment. Thank you. Kristan Tapia, PT    PT evaluation attempted second time. Pt eating lunch at this time. Will f/up at later time as pt schedule allows. Thank you.   Kristan Tapia, PT

## 2020-04-11 NOTE — PROGRESS NOTES
Problem: Chronic Obstructive Pulmonary Disease (COPD)  Goal: *Oxygen saturation during activity within specified parameters  Outcome: Progressing Towards Goal  Goal: *Able to remain out of bed as prescribed  Outcome: Progressing Towards Goal  Goal: *Absence of hypoxia  Outcome: Progressing Towards Goal  Goal: *Optimize nutritional status  Outcome: Progressing Towards Goal     Problem: Hypertension  Goal: *Blood pressure within specified parameters  Outcome: Progressing Towards Goal  Goal: *Fluid volume balance  Outcome: Progressing Towards Goal  Goal: *Labs within defined limits  Outcome: Progressing Towards Goal     Problem: Falls - Risk of  Goal: *Absence of Falls  Description: Document Darell Fall Risk and appropriate interventions in the flowsheet.   Outcome: Progressing Towards Goal  Note: Fall Risk Interventions:  Mobility Interventions: Patient to call before getting OOB, PT Consult for mobility concerns, PT Consult for assist device competence         Medication Interventions: Patient to call before getting OOB, Teach patient to arise slowly                   Problem: Patient Education: Go to Patient Education Activity  Goal: Patient/Family Education  Outcome: Progressing Towards Goal

## 2020-04-11 NOTE — PROGRESS NOTES
Hospitalist Progress Note-critical care note     Patient: Suzie Purcell MRN: [id number]  CSN: [id number]    YOB: 1945  Age: 66 y.o. Sex: male    DOA: 4/9/2020 LOS:  LOS: 2 days            Chief complaint: copd exacerbation, acute on chronic respiratory failure ,paf     Assessment/Plan         Hospital Problems  Date Reviewed: 7/6/2019          Codes Class Noted POA    Hyponatremia ICD-10-CM: E87.1  ICD-9-CM: 276.1  4/10/2020 Unknown        * (Principal) COPD exacerbation ([facility] ICD-10-CM: J44.1  ICD-9-CM: 491.21  4/9/2020 Unknown        Acute respiratory failure with hypoxia and hypercarbia ([facility] ICD-10-CM: J96.01, J96.02  ICD-9-CM: 518.81  4/9/2020 Unknown        Paroxysmal atrial fibrillation (HCC) ICD-10-CM: I48.0  ICD-9-CM: 427.31  8/19/2019 Yes        Chronic renal disease, stage 3, moderately decreased glomerular filtration rate between 30-59 mL/min/1.73 square meter (HCC) ICD-10-CM: N18.3  ICD-9-CM: 585.3  7/20/2018 Yes        Hypertension ICD-10-CM: I10  ICD-9-CM: 401.9  Unknown Yes              Acute on chronic respiratory failure with hypercapnia and hypoxia  Continue nc O2   No significant interval change or acute pulmonary process identified.       Acute COPD exacerbation  Continue IV steroid.  scheduled breathing treatment   Solu-Medrol breathing treatment,      Asbestosis     Hyponatremia   Low dose ns     PAF   Continue monitor   Optimize electrolytes      Hypertension: will put low dose cardizem      ckd3  Improving     Subjective: still wheezing   Add mucinex ,schedued breathing     AC: I have had a discussion with patient regarding code status. Particularly, described potential options in event of cardiac or respiratory arrest. I have explained what being full code entails, including cardiorespiratory resuscitation attempts with chest compressions, potential cariodioversion/ \" shocks\" as well as intubation.  I have also explained Do not resuscitate which would mean we allow a natural death with out aggressive interventions. Full code  AC 32 min         Review of systems:    General: No fevers or chills. Cardiovascular: No chest pain or pressure. No palpitations. Pulmonary:  shortness of breath. Wheezing   Gastrointestinal: No nausea, vomiting. Vital signs/Intake and Output:  Visit Vitals  /70   Pulse 90   Temp 97.7 °F (36.5 °C)   Resp 18   Ht 5' 8\" (1.727 m)   Wt 95 kg (209 lb 6.4 oz)   SpO2 97%   BMI 31.84 kg/m²     Current Shift:  No intake/output data recorded. Last three shifts:  04/09 1901 - 04/11 0700  In: 1386.7 [P.O.:720; I.V.:666.7]  Out: 1900 [Urine:1900]    Physical Exam:  General: WD, WN. Alert, cooperative, no acute distress    HEENT: NC, Atraumatic. PERRLA, anicteric sclerae. Lungs:            Decreased bs bilaterally , wheezing   Heart:  Regular  rhythm,  No murmur, No Rubs, No Gallops  Abdomen: Soft, Non distended, Non tender. +Bowel sounds,   Extremities: No c/c/e  Psych:   Not anxious or agitated. Neurologic:  No acute neurological deficit. Labs: Results:       Chemistry Recent Labs     04/11/20  0255 04/10/20  0230 04/09/20  1945   * 171* 148*   * 131* 132*   K 3.9 3.6 3.9    96* 98*   CO2 26 26 25   BUN 32* 19* 19*   CREA 1.17 1.32* 1.34*   CA 8.0* 8.6 8.6   AGAP 7 9 9   BUCR 27* 14 14   AP  --   --  107   TP  --   --  6.5   ALB  --   --  3.2*   GLOB  --   --  3.3   AGRAT  --   --  1.0      CBC w/Diff Recent Labs     04/11/20  0255 04/10/20  0230 04/09/20  1945   WBC 13.8* 11.4 12.3   RBC 3.57* 3.88* 3.89*   HGB 10.0* 10.9* 10.9*   HCT 30.0* 32.0* 32.5*    267 317   GRANS  --   --  65   LYMPH  --   --  11*   EOS  --   --  13*      Cardiac Enzymes Recent Labs     04/09/20 1945      CKND1 3.5      Coagulation No results for input(s): PTP, INR, APTT, INREXT, INREXT in the last 72 hours.     Lipid Panel No results found for: CHOL, CHOLPOCT, CHOLX, CHLST, CHOLV, 791815, HDL, HDLP, LDL, LDLC, DLDLP, 735620, VLDLC, VLDL, TGLX, TRIGL, TRIGP, TGLPOCT, CHHD, CHHDX   BNP No results for input(s): BNPP in the last 72 hours. Liver Enzymes Recent Labs     04/09/20  1945   TP 6.5   ALB 3.2*      SGOT 14      Thyroid Studies Lab Results   Component Value Date/Time    TSH 0.86 10/20/2019 01:05 AM        Procedures/imaging: see electronic medical records for all procedures/Xrays and details which were not copied into this note but were reviewed prior to creation of Plan    Xr Chest Port    Result Date: 4/9/2020  EXAM: One-view chest CLINICAL HISTORY: meets SIRS criteria , COMPARISON: None FINDINGS: Frontal view of the chest demonstrate chronic pleural-based opacities in the right lung, unchanged. Otherwise fairly clear. . Cardiac silhouette is normal in size and contour. No acute bony or soft tissue abnormality. IMPRESSION: No significant interval change or acute pulmonary process identified.        Raul Elkins MD

## 2020-04-11 NOTE — PROGRESS NOTES
Problem: Self Care Deficits Care Plan (Adult)  Goal: *Acute Goals and Plan of Care (Insert Text)  Description: Occupational Therapy Goals  Initiated 4/11/2020 within 7 day(s). 1.  Patient will perform grooming with supervision/set-up standing at sink for 8 minutes or more. 2.  Patient will perform lower body dressing with supervision/set-up and use of AEs as needed. 3.  Patient will perform toilet transfers with supervision/set-up. 4.  Patient will perform all aspects of toileting with supervision/set-up. 5.  Patient will participate in upper extremity therapeutic exercise/activities with independence for 10 minutes. 6.  Patient will utilize energy conservation techniques during functional activities with verbal, visual and tactile cues. OCCUPATIONAL THERAPY EVALUATION    Patient: Cris Trevizo (60 y.o. male)  Date: 4/11/2020  Primary Diagnosis: COPD exacerbation ([facility]) [J44.1]  Acute respiratory failure with hypoxia and hypercarbia ([facility]) [J96.01, J96.02]        Precautions:   Fall  PLOF: mod I for ADLs and transfers; mostly sedentary lifestyle    ASSESSMENT :  Based on the objective data described below, the patient presents with decreased strength, endurance and balance for carryover of ADLs and transfers following above mentioned medical diagnosis. Pt presented seated at EOB at the beginning of session and agreed to participate with therapy. Pt requires mod A for LB dressing, min-CGA for sit<-->stand with RW. Pt c/o increased pain at L knee with standing and taking steps at EOB. Pt reports he has a very sedentary lifestyle at home and spends most of his time in his recliner with transfers to kitchen and bathroom when needed. Pt was left seated at EOB at the end of session in NAD. CAITLIN Willis informed of pain at L knee. Patient will benefit from skilled intervention to address the above impairments.   Patient's rehabilitation potential is considered to be Good  Factors which may influence rehabilitation potential include:   []             None noted  []             Mental ability/status  [x]             Medical condition  []             Home/family situation and support systems  []             Safety awareness  []             Pain tolerance/management  []             Other:      PLAN :  Recommendations and Planned Interventions:   [x]               Self Care Training                  [x]      Therapeutic Activities  [x]               Functional Mobility Training   []      Cognitive Retraining  [x]               Therapeutic Exercises           [x]      Endurance Activities  [x]               Balance Training                    []      Neuromuscular Re-Education  []               Visual/Perceptual Training     [x]      Home Safety Training  [x]               Patient Education                   [x]      Family Training/Education  []               Other (comment):    Frequency/Duration: Patient will be followed by occupational therapy 1-2 times per day/4-7 days per week to address goals. Discharge Recommendations: Home Health and To Be Determined  Further Equipment Recommendations for Discharge: shower chair and TBD     SUBJECTIVE:   Patient stated  I am doing alright.     OBJECTIVE DATA SUMMARY:     Past Medical History:   Diagnosis Date    Brain aneurysm     Cancer ([facility]     prostate    COP[facility]chronic obstructive [facility] disease) [facility]     Hypertension     Nocturia     Pneumonia     Radiation effect      Past Surgical History:   Procedure Laterality Date    HX HERNIA REPAIR       Barriers to Learning/Limitations: None  Compensate with: visual, verbal, tactile, kinesthetic cues/model    Home Situation:   Home Situation  Home Environment: Trailer/mobile home  # Steps to Enter: 6  Rails to Enter: Yes  Hand Rails : Bilateral  One/Two Story Residence: One story  Living Alone: No  Support Systems: Spouse/Significant Other/Partner, Child(prashant)  Patient Expects to be Discharged to[de-identified] Trailer/mobile home  Current DME Used/Available at Home: Cane, straight, Walker, rolling  Tub or Shower Type: Tub/Shower combination  []  Right hand dominant   []  Left hand dominant    Cognitive/Behavioral Status:  Neurologic State: Alert  Orientation Level: Oriented X4  Cognition: Appropriate for age attention/concentration; Follows commands  Safety/Judgement: Fall prevention    Skin: intact  Edema: none    Vision/Perceptual:    Tracking: Able to track stimulus in all quadrants w/o difficulty    Acuity: Able to read clock/calendar on wall without difficulty    Corrective Lenses: Reading glasses  Coordination: BUE  Coordination: Within functional limits  Fine Motor Skills-Upper: Left Intact; Right Intact    Gross Motor Skills-Upper: Left Intact; Right Intact  Balance:  Sitting: Intact  Standing: Impaired;Pull to stand; With support  Standing - Static: Fair  Standing - Dynamic : Poor  Strength: BUE  Strength: Generally decreased, functional  Tone & Sensation: BUE  Tone: Normal  Sensation: Intact  Range of Motion: BUE  AROM: Generally decreased, functional  Functional Mobility and Transfers for ADLs:  Bed Mobility:  Transfers:  Sit to Stand: Minimum assistance;Contact guard assistance  Stand to Sit: Contact guard assistance  ADL Assessment:   Feeding: Independent    Oral Facial Hygiene/Grooming: Setup    Upper Body Dressing: Independent    Lower Body Dressing: Moderate assistance    Toileting: Minimum assistance  ADL Intervention:  Lower Body Dressing Assistance  Dressing Assistance: Moderate assistance  Socks: Moderate assistance  Leg Crossed Method Used: No  Position Performed: Seated edge of bed  Cues: Don;Physical assistance    Cognitive Retraining  Safety/Judgement: Fall prevention    Therapeutic Exercise:    Pain:  Pain level pre-treatment: 0/10   Pain level post-treatment: 0/10   Pain Intervention(s): Medication (see MAR);  Rest, Ice, Repositioning   Response to intervention: Nurse notified, See doc flow    Activity Tolerance: Good   Please refer to the flowsheet for vital signs taken during this treatment. After treatment:   [x] Patient left in no apparent distress sitting up at EOB  [] Patient left in no apparent distress in bed  [x] Call bell left within reach  [x] Nursing notified  [] Caregiver present  [] Bed alarm activated    COMMUNICATION/EDUCATION:   [x] Role of Occupational Therapy in the acute care setting  [x] Home safety education was provided and the patient/caregiver indicated understanding. [x] Patient/family have participated as able in goal setting and plan of care. [x] Patient/family agree to work toward stated goals and plan of care. [] Patient understands intent and goals of therapy, but is neutral about his/her participation. [] Patient is unable to participate in goal setting and plan of care. Thank you for this referral.  Cherelle Grossman OTR/L  Time Calculation: 18 mins    Eval Complexity: History: MEDIUM Complexity : Expanded review of history including physical, cognitive and psychosocial  history ; Examination: MEDIUM Complexity : 3-5 performance deficits relating to physical, cognitive , or psychosocial skils that result in activity limitations and / or participation restrictions; Decision Making:MEDIUM Complexity : Patient may present with comorbidities that affect occupational performnce.  Miniml to moderate modification of tasks or assistance (eg, physical or verbal ) with assesment(s) is necessary to enable patient to complete evaluation

## 2020-04-11 NOTE — PROGRESS NOTES
Chart reviewed as CM on call. Pt admitted to tele by hospitalist.  PT/OT eval orders noted. Pt has not been able to work with PT at this point. OT recommending HH v TBD. Pt lives with wife and son. No dc order noted. CM will cont to follow for transition of care needs and will be available via hospital  on weekends.

## 2020-04-11 NOTE — PROGRESS NOTES
0720:Received verbal bedside report from off going nurse VINCE Linn R.N. Patient care received. Patient alert and oriented x 4. Patient resting in bed denies pain. Patient stable. Call light with in reach bed in lowest position. 1455:Pt having shortness of breath respiratory paged for breathing treatment. 1504:Pt received breathing treatment. 1520:Pt still having some shortness of breath respiratory therapist suggested that patient may benefit from being on Bi- pap.     1545:Reassessed. Pt states that his breathing is back at his baseline. Pt states that he feels that he was overheated which led to his shortness of breath. Will continue to monitor patient. 1600: Made  aware that patient was having increased shortness of breath from his baseline. Informed  that patient has returned to baseline. Made her aware of the suggestion of Bi - Pap by respiratory therapist.  said to continue to monitor patient no further orders received.

## 2020-04-11 NOTE — PROGRESS NOTES
PRN NEB GIVEN FOR INCREASED SOB AFTER COUGHING SPELL.   PATIENT STATES A LITTLE RELIEF POST TX.  PER DR. EDEN, MAY CHANGE NEBS TO Q4.

## 2020-04-11 NOTE — ROUTINE PROCESS
Bedside and Verbal shift change report given to CHRIS Aquino RN (oncoming nurse) by VINCE Arreguin RN (offgoing nurse). Report included the following information SBAR, Kardex, Intake/Output, MAR and Recent Results.

## 2020-04-11 NOTE — ROUTINE PROCESS
Bedside and Verbal shift change report given to GREGORIO Thrasher R.N. (oncoming nurse) by CHRIS Grewal R.N. (offgoing nurse). Report included the following information SBAR, Kardex, Intake/Output, MAR, Accordion, Recent Results and Med Rec Status.

## 2020-04-12 LAB
ANION GAP SERPL CALC-SCNC: 10 MMOL/L (ref 3–18)
BUN SERPL-MCNC: 33 MG/DL (ref 7–18)
BUN/CREAT SERPL: 27 (ref 12–20)
CALCIUM SERPL-MCNC: 8 MG/DL (ref 8.5–10.1)
CHLORIDE SERPL-SCNC: 102 MMOL/L (ref 100–111)
CO2 SERPL-SCNC: 25 MMOL/L (ref 21–32)
CREAT SERPL-MCNC: 1.23 MG/DL (ref 0.6–1.3)
ERYTHROCYTE [DISTWIDTH] IN BLOOD BY AUTOMATED COUNT: 14.2 % (ref 11.6–14.5)
GLUCOSE BLD STRIP.AUTO-MCNC: 142 MG/DL (ref 70–110)
GLUCOSE BLD STRIP.AUTO-MCNC: 153 MG/DL (ref 70–110)
GLUCOSE BLD STRIP.AUTO-MCNC: 183 MG/DL (ref 70–110)
GLUCOSE BLD STRIP.AUTO-MCNC: 213 MG/DL (ref 70–110)
GLUCOSE SERPL-MCNC: 153 MG/DL (ref 74–99)
HCT VFR BLD AUTO: 30.1 % (ref 36–48)
HGB BLD-MCNC: 10.1 G/DL (ref 13–16)
MCH RBC QN AUTO: 28.5 PG (ref 24–34)
MCHC RBC AUTO-ENTMCNC: 33.6 G/DL (ref 31–37)
MCV RBC AUTO: 85 FL (ref 74–97)
PLATELET # BLD AUTO: 297 K/UL (ref 135–420)
PMV BLD AUTO: 8.7 FL (ref 9.2–11.8)
POTASSIUM SERPL-SCNC: 3.4 MMOL/L (ref 3.5–5.5)
RBC # BLD AUTO: 3.54 M/UL (ref 4.7–5.5)
SODIUM SERPL-SCNC: 137 MMOL/L (ref 136–145)
WBC # BLD AUTO: 11.2 K/UL (ref 4.6–13.2)

## 2020-04-12 PROCEDURE — 65660000000 HC RM CCU STEPDOWN

## 2020-04-12 PROCEDURE — 80048 BASIC METABOLIC PNL TOTAL CA: CPT

## 2020-04-12 PROCEDURE — 74011250636 HC RX REV CODE- 250/636: Performed by: HOSPITALIST

## 2020-04-12 PROCEDURE — 97530 THERAPEUTIC ACTIVITIES: CPT

## 2020-04-12 PROCEDURE — 94760 N-INVAS EAR/PLS OXIMETRY 1: CPT

## 2020-04-12 PROCEDURE — 94640 AIRWAY INHALATION TREATMENT: CPT

## 2020-04-12 PROCEDURE — 74011000250 HC RX REV CODE- 250: Performed by: FAMILY MEDICINE

## 2020-04-12 PROCEDURE — 74011000258 HC RX REV CODE- 258: Performed by: HOSPITALIST

## 2020-04-12 PROCEDURE — 74011250636 HC RX REV CODE- 250/636: Performed by: FAMILY MEDICINE

## 2020-04-12 PROCEDURE — 74011250637 HC RX REV CODE- 250/637: Performed by: HOSPITALIST

## 2020-04-12 PROCEDURE — 36415 COLL VENOUS BLD VENIPUNCTURE: CPT

## 2020-04-12 PROCEDURE — 97116 GAIT TRAINING THERAPY: CPT

## 2020-04-12 PROCEDURE — 74011636637 HC RX REV CODE- 636/637: Performed by: FAMILY MEDICINE

## 2020-04-12 PROCEDURE — 74011250637 HC RX REV CODE- 250/637: Performed by: FAMILY MEDICINE

## 2020-04-12 PROCEDURE — 74011000250 HC RX REV CODE- 250: Performed by: HOSPITALIST

## 2020-04-12 PROCEDURE — 82962 GLUCOSE BLOOD TEST: CPT

## 2020-04-12 PROCEDURE — 85027 COMPLETE CBC AUTOMATED: CPT

## 2020-04-12 PROCEDURE — 97110 THERAPEUTIC EXERCISES: CPT

## 2020-04-12 PROCEDURE — 77010033678 HC OXYGEN DAILY

## 2020-04-12 RX ADMIN — Medication 10 ML: at 21:23

## 2020-04-12 RX ADMIN — Medication 10 ML: at 14:12

## 2020-04-12 RX ADMIN — METHYLPREDNISOLONE SODIUM SUCCINATE 60 MG: 125 INJECTION, POWDER, FOR SOLUTION INTRAMUSCULAR; INTRAVENOUS at 00:56

## 2020-04-12 RX ADMIN — INSULIN LISPRO 2 UNITS: 100 INJECTION, SOLUTION INTRAVENOUS; SUBCUTANEOUS at 12:05

## 2020-04-12 RX ADMIN — METHYLPREDNISOLONE SODIUM SUCCINATE 60 MG: 125 INJECTION, POWDER, FOR SOLUTION INTRAMUSCULAR; INTRAVENOUS at 12:04

## 2020-04-12 RX ADMIN — DOXYCYCLINE 100 MG: 100 INJECTION, POWDER, LYOPHILIZED, FOR SOLUTION INTRAVENOUS at 14:18

## 2020-04-12 RX ADMIN — TAMSULOSIN HYDROCHLORIDE 0.4 MG: 0.4 CAPSULE ORAL at 18:00

## 2020-04-12 RX ADMIN — HEPARIN SODIUM 5000 UNITS: 5000 INJECTION INTRAVENOUS; SUBCUTANEOUS at 06:06

## 2020-04-12 RX ADMIN — HEPARIN SODIUM 5000 UNITS: 5000 INJECTION INTRAVENOUS; SUBCUTANEOUS at 21:22

## 2020-04-12 RX ADMIN — DILTIAZEM HYDROCHLORIDE 30 MG: 30 TABLET, FILM COATED ORAL at 18:00

## 2020-04-12 RX ADMIN — IPRATROPIUM BROMIDE AND ALBUTEROL SULFATE 3 ML: .5; 3 SOLUTION RESPIRATORY (INHALATION) at 12:27

## 2020-04-12 RX ADMIN — IPRATROPIUM BROMIDE AND ALBUTEROL SULFATE 3 ML: .5; 3 SOLUTION RESPIRATORY (INHALATION) at 15:32

## 2020-04-12 RX ADMIN — INSULIN LISPRO 2 UNITS: 100 INJECTION, SOLUTION INTRAVENOUS; SUBCUTANEOUS at 06:41

## 2020-04-12 RX ADMIN — GUAIFENESIN 600 MG: 600 TABLET, EXTENDED RELEASE ORAL at 21:21

## 2020-04-12 RX ADMIN — HYDROCORTISONE: 1 CREAM TOPICAL at 09:05

## 2020-04-12 RX ADMIN — IPRATROPIUM BROMIDE AND ALBUTEROL SULFATE 3 ML: .5; 3 SOLUTION RESPIRATORY (INHALATION) at 01:10

## 2020-04-12 RX ADMIN — IPRATROPIUM BROMIDE AND ALBUTEROL SULFATE 3 ML: .5; 3 SOLUTION RESPIRATORY (INHALATION) at 07:28

## 2020-04-12 RX ADMIN — IPRATROPIUM BROMIDE AND ALBUTEROL SULFATE 3 ML: .5; 3 SOLUTION RESPIRATORY (INHALATION) at 05:00

## 2020-04-12 RX ADMIN — GUAIFENESIN 600 MG: 600 TABLET, EXTENDED RELEASE ORAL at 09:05

## 2020-04-12 RX ADMIN — HEPARIN SODIUM 5000 UNITS: 5000 INJECTION INTRAVENOUS; SUBCUTANEOUS at 14:15

## 2020-04-12 RX ADMIN — BUDESONIDE 500 MCG: 0.5 INHALANT RESPIRATORY (INHALATION) at 20:23

## 2020-04-12 RX ADMIN — METHYLPREDNISOLONE SODIUM SUCCINATE 60 MG: 125 INJECTION, POWDER, FOR SOLUTION INTRAMUSCULAR; INTRAVENOUS at 18:00

## 2020-04-12 RX ADMIN — INSULIN LISPRO 4 UNITS: 100 INJECTION, SOLUTION INTRAVENOUS; SUBCUTANEOUS at 21:22

## 2020-04-12 RX ADMIN — CHLORTHALIDONE 25 MG: 25 TABLET ORAL at 09:04

## 2020-04-12 RX ADMIN — BUDESONIDE 500 MCG: 0.5 INHALANT RESPIRATORY (INHALATION) at 07:28

## 2020-04-12 RX ADMIN — METHYLPREDNISOLONE SODIUM SUCCINATE 60 MG: 125 INJECTION, POWDER, FOR SOLUTION INTRAMUSCULAR; INTRAVENOUS at 06:06

## 2020-04-12 RX ADMIN — IPRATROPIUM BROMIDE AND ALBUTEROL SULFATE 3 ML: .5; 3 SOLUTION RESPIRATORY (INHALATION) at 20:23

## 2020-04-12 RX ADMIN — DOXYCYCLINE 100 MG: 100 INJECTION, POWDER, LYOPHILIZED, FOR SOLUTION INTRAVENOUS at 02:37

## 2020-04-12 RX ADMIN — METHYLPREDNISOLONE SODIUM SUCCINATE 60 MG: 125 INJECTION, POWDER, FOR SOLUTION INTRAMUSCULAR; INTRAVENOUS at 23:28

## 2020-04-12 RX ADMIN — HYDROCORTISONE: 1 CREAM TOPICAL at 21:22

## 2020-04-12 RX ADMIN — DILTIAZEM HYDROCHLORIDE 30 MG: 30 TABLET, FILM COATED ORAL at 12:04

## 2020-04-12 RX ADMIN — DILTIAZEM HYDROCHLORIDE 30 MG: 30 TABLET, FILM COATED ORAL at 06:42

## 2020-04-12 NOTE — PROGRESS NOTES
56 - Assumed care of patient at this time. Pt in bed with no signs of distress. Pt left in bed with no signs of distress. Will continue to monitor.

## 2020-04-12 NOTE — PROGRESS NOTES
0720:Received verbal bedside report from off going nurse CHANTAL Trinh R.N. Patient care received. Patient alert and oriented x 4. Patient resting in bed denies pain. Patient stable. Call light with in reach bed in lowest position.

## 2020-04-12 NOTE — PROGRESS NOTES
Problem: Mobility Impaired (Adult and Pediatric)  Goal: *Acute Goals and Plan of Care (Insert Text)  Description: Physical Therapy Goals   Initiated 4/11/2020 and to be accomplished within 4 day(s)  1. Patient will move from supine <> sit with S in prep for out of bed activity and change of position. 2.  Patient will perform sit<> stand with S with LRAD in prep for transfers/ambulation. 3.  Patient will transfer from bed <> chair with S with LRAD for time up in chair for completion of ADL activity. 4.  Patient will ambulate 150 feet with LRAD/S for improved functional mobility/safe discharge. 5.  Patient will ascend/descend 3-5 stairs with handrail(s) with minimal assistance/contact guard assist for home re-entry as needed. Outcome: Progressing Towards Goal  PHYSICAL THERAPY TREATMENT    Patient: Chris Cardoso (8 y.o. male)  Date: 4/12/2020  Diagnosis: COPD exacerbation (HCC) [J44.1]  Acute respiratory failure with hypoxia and hypercarbia (HCC) [J96.01, J96.02]   COPD exacerbation (HCC)  Precautions: Fall   Chart, physical therapy assessment, plan of care and goals were reviewed. ASSESSMENT:  Pt remains in chair on PT arrival.  IV in place now and O2 remains at 2Lnc. Improved independence in transfers to S. Gt distance decreased to 110ft due to pt fatigue. Pacing self better as compared to his am though. Fair tolerance for strengthening ex as noted below, BLE's. Increased LE edema noted and pt encouraged to elevate LE's to address same since up in chair all day. Assisted to elevate LE's onto chair. Dinner tray arrived and pt left set up with same. Will continue for goals above. Progression toward goals:  [x]      Improving appropriately and progressing toward goals  []      Improving slowly and progressing toward goals  []      Not making progress toward goals and plan of care will be adjusted     PLAN:  Patient continues to benefit from skilled intervention to address the above impairments. Continue treatment per established plan of care. Discharge Recommendations:  Home Health  Further Equipment Recommendations for Discharge:  N/A     SUBJECTIVE:   Patient stated Doing alright; I'm supposed to go home tomorrow.     OBJECTIVE DATA SUMMARY:   Critical Behavior:  Neurologic State: Alert  Orientation Level: Oriented X4  Cognition: Appropriate for age attention/concentration, Follows commands  Safety/Judgement: Fall prevention  Functional Mobility Training:  Transfers:  Sit to Stand: Supervision  Stand to Sit: Supervision  Balance:  Sitting: Intact  Standing: Intact; With support  Standing - Static: Good  Standing - Dynamic : Fair;Good  Ambulation/Gait Training:  Distance (ft): 110 Feet (ft)  Assistive Device: Gait belt;Walker, rolling  Ambulation - Level of Assistance: Stand-by assistance  Gait Abnormalities: Decreased step clearance  Speed/[place]: Pace decreased (<100 feet/min)  Step Length: Right shortened;Left shortened  Interventions: Safety awareness training  Therapeutic Exercises: BLE's  Seated marching x 20, LAQ 10x 3-5sec hold, HR/TR x 20   Pain:  Pain Scale 1: Numeric (0 - 10)  Pain Intensity 1: 0  Activity Tolerance:   Fair   Please refer to the flowsheet for vital signs taken during this treatment.   After treatment:   [x] Patient left in no apparent distress sitting up in chair with dinner meal and LE's elevated onto chair  [] Patient left in no apparent distress in bed  [x] Call bell left within reach  [] Nursing notified  [] Caregiver present  [] Bed alarm activated      Lisa Sanabria PT   Time Calculation: 23 mins

## 2020-04-12 NOTE — ROUTINE PROCESS
Bedside and Verbal shift change report given to CHRIS Wong RN (oncoming nurse) by CHANTAL Renner RN (offgoing nurse). Report included the following information SBAR, Kardex, Intake/Output, MAR and Recent Results.

## 2020-04-12 NOTE — PROGRESS NOTES
Problem: Self Care Deficits Care Plan (Adult)  Goal: *Acute Goals and Plan of Care (Insert Text)  Description: Occupational Therapy Goals  Initiated 4/11/2020 within 7 day(s). 1.  Patient will perform grooming with supervision/set-up standing at sink for 8 minutes or more. 2.  Patient will perform lower body dressing with supervision/set-up and use of AEs as needed. 3.  Patient will perform toilet transfers with supervision/set-up. 4.  Patient will perform all aspects of toileting with supervision/set-up. 5.  Patient will participate in upper extremity therapeutic exercise/activities with independence for 10 minutes. 6.  Patient will utilize energy conservation techniques during functional activities with verbal, visual and tactile cues. Outcome: Progressing Towards Goal    OCCUPATIONAL THERAPY TREATMENT    Patient: Keven Wells (7 y.o. male)  Date: 4/12/2020  Diagnosis: COPD exacerbation (HCC) [J44.1]  Acute respiratory failure with hypoxia and hypercarbia (HCC) [J96.01, J96.02]   COPD exacerbation (HCC)       Precautions: Fall  PLOF: mod I for ADLs and transfers     Chart, occupational therapy assessment, plan of care, and goals were reviewed. ASSESSMENT:  Pt presented seated at EOB at the beginning of session. Pt agreed to participate with therapy and practice toilet transfers for carryover of activity at home. Pt required CG for STS and toilet transfers. Pt became SOB with transfers but insists to continue with activity. Pt encouraged to sit up in chair to improve overall endurance and strength, pt agreed and was left seated in chair at the end of session in NAD, pain 0/10.   Progression toward goals:  [x]          Improving appropriately and progressing toward goals  []          Improving slowly and progressing toward goals  []          Not making progress toward goals and plan of care will be adjusted     PLAN:  Patient continues to benefit from skilled intervention to address the above impairments. Continue treatment per established plan of care. Discharge Recommendations:  Home Health  Further Equipment Recommendations for Discharge:  N/A     SUBJECTIVE:   Patient stated  I think sitting up in chair will be good for my back.     OBJECTIVE DATA SUMMARY:   Cognitive/Behavioral Status:  Neurologic State: Alert  Orientation Level: Oriented X4  Cognition: Appropriate for age attention/concentration, Follows commands  Safety/Judgement: Fall prevention    Functional Mobility and Transfers for ADLs:   Bed Mobility:  Scooting: Contact guard assistance   Transfers:  Sit to Stand: Contact guard assistance  Stand to Sit: Stand-by assistance  Bed to Chair: Stand-by assistance   Toilet Transfer : Contact guard assistance  Balance:  Sitting: Intact  Standing: Intact; With support  Standing - Static: Good  Standing - Dynamic : Fair(+)  ADL Intervention:  Cognitive Retraining  Safety/Judgement: Fall prevention  Neuro Re-Education:  UE Therapeutic Exercises:     Pain:  Pain level pre-treatment: 0/10   Pain level post-treatment: 0/10  Pain Intervention(s): Medication (see MAR); Rest, Ice, Repositioning   Response to intervention: Nurse notified, See doc flow    Activity Tolerance:    Good   Please refer to the flowsheet for vital signs taken during this treatment. After treatment:   [x]  Patient left in no apparent distress sitting up in chair  []  Patient left in no apparent distress in bed  [x]  Call bell left within reach  []  Nursing notified  []  Caregiver present  []  Bed alarm activated    COMMUNICATION/EDUCATION:   [x] Role of Occupational Therapy in the acute care setting  [x] Home safety education was provided and the patient/caregiver indicated understanding. [x] Patient/family have participated as able in working towards goals and plan of care. [x] Patient/family agree to work toward stated goals and plan of care.   [] Patient understands intent and goals of therapy, but is neutral about his/her participation. [] Patient is unable to participate in goal setting and plan of care.       Thank you for this referral.  Zenobia Moser, OTR/L  Time Calculation: 15 mins

## 2020-04-12 NOTE — PROGRESS NOTES
Problem: Mobility Impaired (Adult and Pediatric)  Goal: *Acute Goals and Plan of Care (Insert Text)  Description: Physical Therapy Goals   Initiated 4/11/2020 and to be accomplished within 4 day(s)  1. Patient will move from supine <> sit with S in prep for out of bed activity and change of position. 2.  Patient will perform sit<> stand with S with LRAD in prep for transfers/ambulation. 3.  Patient will transfer from bed <> chair with S with LRAD for time up in chair for completion of ADL activity. 4.  Patient will ambulate 150 feet with LRAD/S for improved functional mobility/safe discharge. 5.  Patient will ascend/descend 3-5 stairs with handrail(s) with minimal assistance/contact guard assist for home re-entry as needed. Outcome: Progressing Towards Goal  PHYSICAL THERAPY TREATMENT    Patient: Sulma Kurtz (66 y.o. male)  Date: 4/12/2020  Diagnosis: COPD exacerbation (HCC) [J44.1]  Acute respiratory failure with hypoxia and hypercarbia (HCC) [J96.01, J96.02]   COPD exacerbation (HCC)  Precautions: Fall   Chart, physical therapy assessment, plan of care and goals were reviewed. ASSESSMENT:  Pt found up in chair on PT arrival and agreeable to participate with PT session. Pt able to increase gt distance to 140ft with RW/CGA/S. Pace mostly decreased, however, occassionally increases and pt educated in pacing self to maintain activity tolerance. One standing rest break required. Verbal cues also for posture correction/walk more closely into RW for maximal balance/safety and fall prevention. Pt returned to room and left back in chair in NAD with all needs in reach. Progression toward goals:  [x]      Improving appropriately and progressing toward goals  []      Improving slowly and progressing toward goals  []      Not making progress toward goals and plan of care will be adjusted     PLAN:  Patient continues to benefit from skilled intervention to address the above impairments.   Continue treatment per established plan of care. Discharge Recommendations:  Home Health  Further Equipment Recommendations for Discharge:  N/A     SUBJECTIVE:   Patient stated Not to to bad.     OBJECTIVE DATA SUMMARY:   Critical Behavior:  Neurologic State: Alert  Orientation Level: Oriented X4  Cognition: Appropriate safety awareness, Appropriate decision making, Appropriate for age attention/concentration, Follows commands  Safety/Judgement: Fall prevention  Functional Mobility Training:  Transfers:  Sit to Stand: Contact guard assistance  Stand to Sit: Stand-by assistance  Balance:  Sitting: Intact  Standing: Intact; With support  Standing - Static: Good  Standing - Dynamic : Fair;Good  Ambulation/Gait Training:  Distance (ft): 140 Feet (ft)(standing rest half way point)  Assistive Device: Gait belt;Walker, rolling  Ambulation - Level of Assistance: Contact guard assistance;Supervision  Gait Abnormalities: Decreased step clearance  Speed/Lisbet: Pace decreased (<100 feet/min)  Step Length: Right shortened;Left shortened  Interventions: Verbal cues(pacing activity and posture)  Pain:  Pain Scale 1: Numeric (0 - 10)  Pain Intensity 1: 0  Activity Tolerance:   Fair   Please refer to the flowsheet for vital signs taken during this treatment.   After treatment:   [x] Patient left in no apparent distress sitting up in chair  [] Patient left in no apparent distress in bed  [x] Call bell left within reach  [] Nursing notified  [] Caregiver present  [] Bed alarm activated      Gisselle Jaquez, PT   Time Calculation: 14 mins

## 2020-04-12 NOTE — PROGRESS NOTES
Hospitalist Progress Note-critical care note     Patient: Chris Cardoso MRN: [id number]  CSN: [id number]    YOB: 1945  Age: 66 y.o. Sex: male    DOA: 4/9/2020 LOS:  LOS: 3 days            Chief complaint: copd exacerbation, acute on chronic respiratory failure ,paf     Assessment/Plan         Hospital Problems  Date Reviewed: 7/6/2019          Codes Class Noted POA    Hyponatremia ICD-10-CM: E87.1  ICD-9-CM: 276.1  4/10/2020 Unknown        * (Principal) COPD exacerbation ([facility] ICD-10-CM: J44.1  ICD-9-CM: 491.21  4/9/2020 Unknown        Acute respiratory failure with hypoxia and hypercarbia ([facility] ICD-10-CM: J96.01, J96.02  ICD-9-CM: 518.81  4/9/2020 Unknown        Paroxysmal atrial fibrillation (HCC) ICD-10-CM: I48.0  ICD-9-CM: 427.31  8/19/2019 Yes        Chronic renal disease, stage 3, moderately decreased glomerular filtration rate between 30-59 mL/min/1.73 square meter (HCC) ICD-10-CM: N18.3  ICD-9-CM: 585.3  7/20/2018 Yes        Hypertension ICD-10-CM: I10  ICD-9-CM: 401.9  Unknown Yes              Acute on chronic respiratory failure with hypercapnia and hypoxia  Continue nc O2 -home O2 user      Acute COPD exacerbation  Mild improving today   Continue IV steroid.  and breathing tx  Wean off steroid slowly   Solu-Medrol breathing treatment,      Asbestosis     Hyponatremia   Resolved     PAF   Continue monitor   Optimize electrolytes      Hypertension: will put low dose cardizem      ckd3  Cr wnl now     Subjective: feel better now     Will have palliative care about code status     Review of systems:    General: No fevers or chills. Cardiovascular: No chest pain or pressure. No palpitations. Pulmonary:  shortness of breath better   Gastrointestinal: No nausea, vomiting.      Vital signs/Intake and Output:  Visit Vitals  /63 (BP 1 Location: Left arm, BP Patient Position: At rest)   Pulse 77   Temp 97.3 °F (36.3 °C)   Resp 20   Ht 5' 8\" (1.727 m)   Wt 95 kg (209 lb 6.4 oz)   SpO2 100%   BMI 31.84 kg/m²     Current Shift:  04/12 0701 - 04/12 1900  In: 480 [P.O.:480]  Out: 350 [Urine:350]  Last three shifts:  04/10 1901 - 04/12 0700  In: 666.7 [I.V.:666.7]  Out: 300 [Urine:300]    Physical Exam:  General: WD, WN. Alert, cooperative, no acute distress    HEENT: NC, Atraumatic. PERRLA, anicteric sclerae. Lungs:            Decreased bs bilaterally , less  wheezing   Heart:  Regular  rhythm,  No murmur, No Rubs, No Gallops  Abdomen: Soft, Non distended, Non tender. +Bowel sounds,   Extremities: No c/c/e  Psych:   Not anxious or agitated. Neurologic:  No acute neurological deficit. Labs: Results:       Chemistry Recent Labs     04/12/20  0250 04/11/20  0255 04/10/20  0230 04/09/20  1945   * 145* 171* 148*    134* 131* 132*   K 3.4* 3.9 3.6 3.9    101 96* 98*   CO2 25 26 26 25   BUN 33* 32* 19* 19*   CREA 1.23 1.17 1.32* 1.34*   CA 8.0* 8.0* 8.6 8.6   AGAP 10 7 9 9   BUCR 27* 27* 14 14   AP  --   --   --  107   TP  --   --   --  6.5   ALB  --   --   --  3.2*   GLOB  --   --   --  3.3   AGRAT  --   --   --  1.0      CBC w/Diff Recent Labs     04/12/20  0250 04/11/20  0255 04/10/20  0230 04/09/20  1945   WBC 11.2 13.8* 11.4 12.3   RBC 3.54* 3.57* 3.88* 3.89*   HGB 10.1* 10.0* 10.9* 10.9*   HCT 30.1* 30.0* 32.0* 32.5*    301 267 317   GRANS  --   --   --  65   LYMPH  --   --   --  11*   EOS  --   --   --  13*      Cardiac Enzymes Recent Labs     04/09/20 1945      CKND1 3.5      Coagulation No results for input(s): PTP, INR, APTT, INREXT, INREXT in the last 72 hours. Lipid Panel No results found for: CHOL, CHOLPOCT, CHOLX, CHLST, CHOLV, 428205, HDL, HDLP, LDL, LDLC, DLDLP, 987641, VLDLC, VLDL, TGLX, TRIGL, TRIGP, TGLPOCT, CHHD, CHHDX   BNP No results for input(s): BNPP in the last 72 hours.    Liver Enzymes Recent Labs     04/09/20 1945   TP 6.5   ALB 3.2*      SGOT 14      Thyroid Studies Lab Results   Component Value Date/Time    TSH 0.86 10/20/2019 01:05 AM        Procedures/imaging: see electronic medical records for all procedures/Xrays and details which were not copied into this note but were reviewed prior to creation of Plan    Xr Chest Port    Result Date: 4/9/2020  EXAM: One-view chest CLINICAL HISTORY: meets SIRS criteria , COMPARISON: None FINDINGS: Frontal view of the chest demonstrate chronic pleural-based opacities in the right lung, unchanged. Otherwise fairly clear. . Cardiac silhouette is normal in size and contour. No acute bony or soft tissue abnormality. IMPRESSION: No significant interval change or acute pulmonary process identified.        Maxim Colin MD

## 2020-04-12 NOTE — PROGRESS NOTES
Problem: Chronic Obstructive Pulmonary Disease (COPD)  Goal: *Oxygen saturation during activity within specified parameters  Outcome: Progressing Towards Goal  Goal: *Able to remain out of bed as prescribed  Outcome: Progressing Towards Goal  Goal: *Absence of hypoxia  Outcome: Progressing Towards Goal  Goal: *Optimize nutritional status  Outcome: Progressing Towards Goal     Problem: Hypertension  Goal: *Blood pressure within specified parameters  Outcome: Progressing Towards Goal  Goal: *Fluid volume balance  Outcome: Progressing Towards Goal  Goal: *Labs within defined limits  Outcome: Progressing Towards Goal     Problem: Chronic Renal Failure  Goal: *Fluid and electrolytes stabilized  Outcome: Progressing Towards Goal     Problem: Falls - Risk of  Goal: *Absence of Falls  Description: Document Darell Fall Risk and appropriate interventions in the flowsheet.   Outcome: Progressing Towards Goal  Note: Fall Risk Interventions:  Mobility Interventions: Assess mobility with egress test, Patient to call before getting OOB, OT consult for ADLs, PT Consult for mobility concerns, PT Consult for assist device competence, Utilize walker, cane, or other assistive device         Medication Interventions: Teach patient to arise slowly, Patient to call before getting OOB                   Problem: Patient Education: Go to Patient Education Activity  Goal: Patient/Family Education  Outcome: Progressing Towards Goal     Problem: Patient Education: Go to Patient Education Activity  Goal: Patient/Family Education  Outcome: Progressing Towards Goal     Problem: Patient Education: Go to Patient Education Activity  Goal: Patient/Family Education  Outcome: Progressing Towards Goal

## 2020-04-12 NOTE — PROGRESS NOTES
Chart reviewed as CM on call. Pt admitted to tele by hospitalist.  [facility] Pt has been offered [facility] for both and pt has confirmed receipt of lists. This CM called pt on phone to discuss dc plan, brief conversation had as pt states he was eating lunch. At this time pt states he is refusing both rehab and HH. He states he has a wife and adult son at home to assist.  Pt noted to have high RRAT score, so may want to revisit. CM will cont to follow for transition of care needs and will be available via hospital  on weekends.

## 2020-04-12 NOTE — ROUTINE PROCESS
Bedside and Verbal shift change report given to JACKIE Pierce (oncoming nurse) by CHRIS Grewal R.N. (offgoing nurse). Report included the following information SBAR, Kardex, Intake/Output, MAR, Accordion, Recent Results and Med Rec Status.

## 2020-04-12 NOTE — PROGRESS NOTES
Problem: Chronic Obstructive Pulmonary Disease (COPD)  Goal: *Oxygen saturation during activity within specified parameters  Outcome: Progressing Towards Goal  Goal: *Able to remain out of bed as prescribed  Outcome: Progressing Towards Goal  Goal: *Absence of hypoxia  Outcome: Progressing Towards Goal  Goal: *Optimize nutritional status  Outcome: Progressing Towards Goal     Problem: Hypertension  Goal: *Blood pressure within specified parameters  Outcome: Progressing Towards Goal  Goal: *Fluid volume balance  Outcome: Progressing Towards Goal  Goal: *Labs within defined limits  Outcome: Progressing Towards Goal     Problem: Chronic Renal Failure  Goal: *Fluid and electrolytes stabilized  Outcome: Progressing Towards Goal     Problem: Falls - Risk of  Goal: *Absence of Falls  Description: Document Darell Fall Risk and appropriate interventions in the flowsheet.   Outcome: Progressing Towards Goal  Note: Fall Risk Interventions:  Mobility Interventions: Assess mobility with egress test, Patient to call before getting OOB         Medication Interventions: Patient to call before getting OOB, Teach patient to arise slowly                   Problem: Patient Education: Go to Patient Education Activity  Goal: Patient/Family Education  Outcome: Progressing Towards Goal

## 2020-04-12 NOTE — PROGRESS NOTES
Shift summary- Pt left in the room no signs of distress. Pt educated on side effects of medication Pt pain managed by PRN medication per MAR. All Pt's concerns addressed.

## 2020-04-13 LAB
ANION GAP SERPL CALC-SCNC: 8 MMOL/L (ref 3–18)
BUN SERPL-MCNC: 35 MG/DL (ref 7–18)
BUN/CREAT SERPL: 26 (ref 12–20)
CALCIUM SERPL-MCNC: 8.1 MG/DL (ref 8.5–10.1)
CHLORIDE SERPL-SCNC: 100 MMOL/L (ref 100–111)
CO2 SERPL-SCNC: 27 MMOL/L (ref 21–32)
CREAT SERPL-MCNC: 1.34 MG/DL (ref 0.6–1.3)
GLUCOSE BLD STRIP.AUTO-MCNC: 146 MG/DL (ref 70–110)
GLUCOSE BLD STRIP.AUTO-MCNC: 160 MG/DL (ref 70–110)
GLUCOSE BLD STRIP.AUTO-MCNC: 170 MG/DL (ref 70–110)
GLUCOSE BLD STRIP.AUTO-MCNC: 269 MG/DL (ref 70–110)
GLUCOSE SERPL-MCNC: 163 MG/DL (ref 74–99)
MAGNESIUM SERPL-MCNC: 2.1 MG/DL (ref 1.6–2.6)
POTASSIUM SERPL-SCNC: 3 MMOL/L (ref 3.5–5.5)
SODIUM SERPL-SCNC: 135 MMOL/L (ref 136–145)

## 2020-04-13 PROCEDURE — 74011000250 HC RX REV CODE- 250: Performed by: FAMILY MEDICINE

## 2020-04-13 PROCEDURE — 94640 AIRWAY INHALATION TREATMENT: CPT

## 2020-04-13 PROCEDURE — 97116 GAIT TRAINING THERAPY: CPT

## 2020-04-13 PROCEDURE — 74011250636 HC RX REV CODE- 250/636: Performed by: HOSPITALIST

## 2020-04-13 PROCEDURE — 77010033678 HC OXYGEN DAILY

## 2020-04-13 PROCEDURE — 65660000000 HC RM CCU STEPDOWN

## 2020-04-13 PROCEDURE — 74011000250 HC RX REV CODE- 250: Performed by: HOSPITALIST

## 2020-04-13 PROCEDURE — 74011250637 HC RX REV CODE- 250/637: Performed by: HOSPITALIST

## 2020-04-13 PROCEDURE — 82962 GLUCOSE BLOOD TEST: CPT

## 2020-04-13 PROCEDURE — 80048 BASIC METABOLIC PNL TOTAL CA: CPT

## 2020-04-13 PROCEDURE — 36415 COLL VENOUS BLD VENIPUNCTURE: CPT

## 2020-04-13 PROCEDURE — 74011000258 HC RX REV CODE- 258: Performed by: HOSPITALIST

## 2020-04-13 PROCEDURE — 74011636637 HC RX REV CODE- 636/637: Performed by: FAMILY MEDICINE

## 2020-04-13 PROCEDURE — 74011250636 HC RX REV CODE- 250/636: Performed by: FAMILY MEDICINE

## 2020-04-13 PROCEDURE — 83735 ASSAY OF MAGNESIUM: CPT

## 2020-04-13 PROCEDURE — 94760 N-INVAS EAR/PLS OXIMETRY 1: CPT

## 2020-04-13 PROCEDURE — 74011250637 HC RX REV CODE- 250/637: Performed by: FAMILY MEDICINE

## 2020-04-13 PROCEDURE — 76450000000

## 2020-04-13 PROCEDURE — 97530 THERAPEUTIC ACTIVITIES: CPT

## 2020-04-13 RX ADMIN — IPRATROPIUM BROMIDE AND ALBUTEROL SULFATE 3 ML: .5; 3 SOLUTION RESPIRATORY (INHALATION) at 15:17

## 2020-04-13 RX ADMIN — IPRATROPIUM BROMIDE AND ALBUTEROL SULFATE 3 ML: .5; 3 SOLUTION RESPIRATORY (INHALATION) at 19:58

## 2020-04-13 RX ADMIN — IPRATROPIUM BROMIDE AND ALBUTEROL SULFATE 3 ML: .5; 3 SOLUTION RESPIRATORY (INHALATION) at 07:20

## 2020-04-13 RX ADMIN — Medication 10 ML: at 23:11

## 2020-04-13 RX ADMIN — HYDROCORTISONE: 1 CREAM TOPICAL at 08:56

## 2020-04-13 RX ADMIN — BUDESONIDE 500 MCG: 0.5 INHALANT RESPIRATORY (INHALATION) at 07:20

## 2020-04-13 RX ADMIN — Medication 10 ML: at 06:00

## 2020-04-13 RX ADMIN — HEPARIN SODIUM 5000 UNITS: 5000 INJECTION INTRAVENOUS; SUBCUTANEOUS at 21:31

## 2020-04-13 RX ADMIN — BUDESONIDE 500 MCG: 0.5 INHALANT RESPIRATORY (INHALATION) at 19:58

## 2020-04-13 RX ADMIN — DILTIAZEM HYDROCHLORIDE 30 MG: 30 TABLET, FILM COATED ORAL at 06:37

## 2020-04-13 RX ADMIN — DILTIAZEM HYDROCHLORIDE 30 MG: 30 TABLET, FILM COATED ORAL at 16:44

## 2020-04-13 RX ADMIN — METHYLPREDNISOLONE SODIUM SUCCINATE 60 MG: 125 INJECTION, POWDER, FOR SOLUTION INTRAMUSCULAR; INTRAVENOUS at 06:23

## 2020-04-13 RX ADMIN — DOXYCYCLINE 100 MG: 100 INJECTION, POWDER, LYOPHILIZED, FOR SOLUTION INTRAVENOUS at 15:12

## 2020-04-13 RX ADMIN — TAMSULOSIN HYDROCHLORIDE 0.4 MG: 0.4 CAPSULE ORAL at 18:52

## 2020-04-13 RX ADMIN — METHYLPREDNISOLONE SODIUM SUCCINATE 60 MG: 125 INJECTION, POWDER, FOR SOLUTION INTRAMUSCULAR; INTRAVENOUS at 11:43

## 2020-04-13 RX ADMIN — HEPARIN SODIUM 5000 UNITS: 5000 INJECTION INTRAVENOUS; SUBCUTANEOUS at 06:23

## 2020-04-13 RX ADMIN — Medication 10 ML: at 14:00

## 2020-04-13 RX ADMIN — DOXYCYCLINE 100 MG: 100 INJECTION, POWDER, LYOPHILIZED, FOR SOLUTION INTRAVENOUS at 03:53

## 2020-04-13 RX ADMIN — INSULIN LISPRO 2 UNITS: 100 INJECTION, SOLUTION INTRAVENOUS; SUBCUTANEOUS at 06:26

## 2020-04-13 RX ADMIN — INSULIN LISPRO 2 UNITS: 100 INJECTION, SOLUTION INTRAVENOUS; SUBCUTANEOUS at 21:40

## 2020-04-13 RX ADMIN — IPRATROPIUM BROMIDE AND ALBUTEROL SULFATE 3 ML: .5; 3 SOLUTION RESPIRATORY (INHALATION) at 00:20

## 2020-04-13 RX ADMIN — GUAIFENESIN 600 MG: 600 TABLET, EXTENDED RELEASE ORAL at 21:31

## 2020-04-13 RX ADMIN — HYDROCORTISONE: 1 CREAM TOPICAL at 21:34

## 2020-04-13 RX ADMIN — GUAIFENESIN 600 MG: 600 TABLET, EXTENDED RELEASE ORAL at 08:55

## 2020-04-13 RX ADMIN — Medication 10 ML: at 23:10

## 2020-04-13 RX ADMIN — INSULIN LISPRO 6 UNITS: 100 INJECTION, SOLUTION INTRAVENOUS; SUBCUTANEOUS at 11:44

## 2020-04-13 RX ADMIN — IPRATROPIUM BROMIDE AND ALBUTEROL SULFATE 3 ML: .5; 3 SOLUTION RESPIRATORY (INHALATION) at 11:13

## 2020-04-13 RX ADMIN — CHLORTHALIDONE 25 MG: 25 TABLET ORAL at 08:55

## 2020-04-13 RX ADMIN — METHYLPREDNISOLONE SODIUM SUCCINATE 60 MG: 125 INJECTION, POWDER, FOR SOLUTION INTRAMUSCULAR; INTRAVENOUS at 21:31

## 2020-04-13 RX ADMIN — IPRATROPIUM BROMIDE AND ALBUTEROL SULFATE 3 ML: .5; 3 SOLUTION RESPIRATORY (INHALATION) at 23:45

## 2020-04-13 RX ADMIN — HEPARIN SODIUM 5000 UNITS: 5000 INJECTION INTRAVENOUS; SUBCUTANEOUS at 15:12

## 2020-04-13 RX ADMIN — IPRATROPIUM BROMIDE AND ALBUTEROL SULFATE 3 ML: .5; 3 SOLUTION RESPIRATORY (INHALATION) at 05:03

## 2020-04-13 RX ADMIN — DILTIAZEM HYDROCHLORIDE 30 MG: 30 TABLET, FILM COATED ORAL at 11:43

## 2020-04-13 NOTE — PROGRESS NOTES
19:40 Assessment completed. O2 remains @ 2 LPM per NC. Lungs are clear bilat but are decreased in the bases. Denies CP,pressure, or SOB. Resting quietly in bed with TV on. Voiding per urinal w/o difficulty. 22:45 Shift assessment completed. See ns flow sheet for details. 03:00 Reassessed with 0 changes noted. Resting quietly in bed with eyes closed between cares x for voiding per urinal w/o difficulty. 07:10 Bedside and Verbal shift change report given to MARKOS Conner RN (oncoming nurse) by Rebekah Albert RN (offgoing nurse). Report included the following information SBAR.

## 2020-04-13 NOTE — ROUTINE PROCESS
Bedside and Verbal shift change report given to Zenobia Cason RN (oncoming nurse) by Joey Gallo RN (offgoing nurse). Report included the following information SBAR and Kardex.

## 2020-04-13 NOTE — PROGRESS NOTES
0700 Assumed responsibility for patient from Clemente Galloway RN    Patient had an uneventful shift.     1920 Shift report given to Minnie Duke

## 2020-04-13 NOTE — PROGRESS NOTES
Problem: Chronic Obstructive Pulmonary Disease (COPD)  Goal: *Oxygen saturation during activity within specified parameters  Outcome: Progressing Towards Goal  Goal: *Able to remain out of bed as prescribed  Outcome: Progressing Towards Goal  Goal: *Absence of hypoxia  Outcome: Progressing Towards Goal  Goal: *Optimize nutritional status  Outcome: Progressing Towards Goal     Problem: Hypertension  Goal: *Blood pressure within specified parameters  Outcome: Progressing Towards Goal  Goal: *Fluid volume balance  Outcome: Progressing Towards Goal  Goal: *Labs within defined limits  Outcome: Progressing Towards Goal     Problem: Chronic Renal Failure  Goal: *Fluid and electrolytes stabilized  Outcome: Progressing Towards Goal     Problem: Falls - Risk of  Goal: *Absence of Falls  Description: Document Darell Fall Risk and appropriate interventions in the flowsheet.   Outcome: Progressing Towards Goal  Note: Fall Risk Interventions:  Mobility Interventions: Patient to call before getting OOB         Medication Interventions: Teach patient to arise slowly, Patient to call before getting OOB                   Problem: Patient Education: Go to Patient Education Activity  Goal: Patient/Family Education  Outcome: Progressing Towards Goal     Problem: Patient Education: Go to Patient Education Activity  Goal: Patient/Family Education  Outcome: Progressing Towards Goal     Problem: Patient Education: Go to Patient Education Activity  Goal: Patient/Family Education  Outcome: Progressing Towards Goal

## 2020-04-13 NOTE — PROGRESS NOTES
Transition of care: anticipate d/c home when medically cleared  Telephone call with patient  States he is going home tomorrow. Patient refuses [address] or rehab. Patient reports he has oxygen at home he uses [employer] as provierPatient lives with his wife and and his adult son. Patient states he does not want any one coming to his home. Patient has pcp Dr. Latha Angulo. Patient has medicare for insurance. Cm will continue to follow  Care Management Interventions  PCP Verified by CM: Yes  Mode of Transport at Discharge:  Other (see comment)(family)  Transition of Care Consult (CM Consult): Discharge Planning  Current Support Network: Lives with Spouse  Confirm Follow Up Transport: Family

## 2020-04-13 NOTE — PROGRESS NOTES
Problem: Mobility Impaired (Adult and Pediatric)  Goal: *Acute Goals and Plan of Care (Insert Text)  Description: Physical Therapy Goals   Initiated 4/11/2020 and to be accomplished within 4 day(s)  1. Patient will move from supine <> sit with S in prep for out of bed activity and change of position. 2.  Patient will perform sit<> stand with S with LRAD in prep for transfers/ambulation. 3.  Patient will transfer from bed <> chair with S with LRAD for time up in chair for completion of ADL activity. 4.  Patient will ambulate 150 feet with LRAD/S for improved functional mobility/safe discharge. 5.  Patient will ascend/descend 3-5 stairs with handrail(s) with minimal assistance/contact guard assist for home re-entry as needed. Outcome: Progressing Towards Goal   PHYSICAL THERAPY TREATMENT    Patient: Amish Gonzalez (1 y.o. male)  Date: 4/13/2020  Diagnosis: COPD exacerbation (HCC) [J44.1]  Acute respiratory failure with hypoxia and hypercarbia (HCC) [J96.01, J96.02]   COPD exacerbation (HCC)       Precautions: Fall   Chart, physical therapy assessment, plan of care and goals were reviewed. ASSESSMENT:  Pt seen sitting at the EOB prior to session w/ supplemental O2 donned. Pt reported 6/10 pain in L knee. Pt has audible wheezing during session but O2 levels remained in the mid 90s. Pt requested to only ambulate a short distance today as pt reports increase knee pain while ambulating. Pt transferred back to room where pt was able to perform therex activity w/ min VCs. Pt left sitting at the EOB after session, call bell and tray in reach, nurse notified after session. Progression toward goals:  [x]      Improving appropriately and progressing toward goals  []      Improving slowly and progressing toward goals  []      Not making progress toward goals and plan of care will be adjusted     PLAN:  Patient continues to benefit from skilled intervention to address the above impairments.   Continue treatment per established plan of care. Discharge Recommendations:  Home Health  Further Equipment Recommendations for Discharge:  N/A     SUBJECTIVE:   Patient stated I think they said I can go home today.     OBJECTIVE DATA SUMMARY:   Critical Behavior:  Neurologic State: Alert  Orientation Level: Oriented X4  Cognition: Follows commands  Safety/Judgement: Awareness of environment, Fall prevention, Good awareness of safety precautions, Home safety  Functional Mobility Training:  Transfers:  Sit to Stand: Supervision  Stand to Sit: Supervision  Balance:  Sitting: Intact  Standing: Intact; With support  Ambulation/Gait Training:  Distance (ft): 60 Feet (ft)  Assistive Device: Gait belt;Walker, rolling  Ambulation - Level of Assistance: Supervision;Stand-by assistance  Gait Abnormalities: Decreased step clearance  Speed/Lisbet: Pace decreased (<100 feet/min)  Step Length: Left shortened;Right shortened  Interventions: Safety awareness training; Tactile cues  Therapeutic Exercises:       EXERCISE   Sets   Reps   Active Active Assist   Passive Self ROM   Comments   Ankle Pumps    [] [] [] []    Quad Sets/Glut Sets   [] [] [] []    Hamstring Sets   [] [] [] []    Short Arc Quads   [] [] [] []    Heel Slides   [] [] [] []    Straight Leg Raises   [] [] [] []    Hip Abd/Add 2 10 [x] [] [] []    Long Arc Quads 2 10 [x] [] [] []    Seated Marching 2 10 [x] [] [] []    Standing Marching   [] [] [] []       [] [] [] []       Pain:  Pain Scale 1: Numeric (0 - 10)  Pain Intensity 1: 6  Pain Location 1: Knee  Pain Orientation 1: Left  Activity Tolerance:   Fair  Please refer to the flowsheet for vital signs taken during this treatment.   After treatment:   [] Patient left in no apparent distress sitting up in chair  [x] Patient left in no apparent distress in bed (sitting at the EOB)  [x] Call bell left within reach  [x] Nursing notified  [] Caregiver present  [] Bed alarm activated      Cindi Lindsey, PT   Time Calculation: 16 mins

## 2020-04-13 NOTE — PROGRESS NOTES
Hospitalist Progress Note-critical care note     Patient: Sb Valdes MRN: [id number]  CSN: [id number]    YOB: 1945  Age: 66 y.o. Sex: male    DOA: 4/9/2020 LOS:  LOS: 4 days            Chief complaint: copd exacerbation, acute on chronic respiratory failure ,paf     Assessment/Plan         Hospital Problems  Date Reviewed: 7/6/2019          Codes Class Noted POA    Hyponatremia ICD-10-CM: E87.1  ICD-9-CM: 276.1  4/10/2020 Unknown        * (Principal) COPD exacerbation ([facility] ICD-10-CM: J44.1  ICD-9-CM: 491.21  4/9/2020 Unknown        Acute respiratory failure with hypoxia and hypercarbia ([facility] ICD-10-CM: J96.01, J96.02  ICD-9-CM: 518.81  4/9/2020 Unknown        Paroxysmal atrial fibrillation ([facility] ICD-10-CM: I48.0  ICD-9-CM: 427.31  8/19/2019 Yes        Asbestosis ([facility] ICD-10-CM: I65  ICD-9-CM: 634  10/19/2018 Yes        Chronic renal disease, stage 3, moderately decreased glomerular filtration rate between 30-59 mL/min/1.73 square meter (HCC) ICD-10-CM: N18.3  ICD-9-CM: 585.3  7/20/2018 Yes        Hypertension ICD-10-CM: I10  ICD-9-CM: 401.9  Unknown Yes              Acute on chronic respiratory failure with hypercapnia and hypoxia  Continue nc O2 -home O2 user 2 L    Acute COPD exacerbation  Continue Improving   Continue IV steroid-start weaning   and breathing tx       Asbestosis-irreversable disease   He has POST in file,  Will have palliative care to readdress code status      Hyponatremia   Resolved     PAF   Continue monitor   Optimize electrolytes      Hypertension: will put low dose cardizem      ckd3  Cr wnl now     Subjective: feel better now , sob is close to my base line     Review of systems:    General: No fevers or chills. Cardiovascular: No chest pain or pressure. No palpitations. Pulmonary:  shortness of breath better   Gastrointestinal: No nausea, vomiting.      Vital signs/Intake and Output:  Visit Vitals  /77   Pulse 80   Temp 98.5 °F (36.9 °C)   Resp 20   Ht 5' 8\" (1.727 m)   Wt 90.7 kg (199 lb 15.3 oz)   SpO2 97%   BMI 30.40 kg/m²     Current Shift:  04/13 0701 - 04/13 1900  In: 480 [P.O.:480]  Out: 700 [Urine:700]  Last three shifts:  04/11 1901 - 04/13 0700  In: 1280 [P.O.:1180; I.V.:100]  Out: 1050 [Urine:1050]    Physical Exam:  General: WD, WN. Alert, cooperative, no acute distress    HEENT: NC, Atraumatic. PERRLA, anicteric sclerae. Lungs:            Decreased bs bilaterally , no wheezing   Heart:  Regular  rhythm,  No murmur, No Rubs, No Gallops  Abdomen: Soft, Non distended, Non tender. +Bowel sounds,   Extremities: No c/c/e  Psych:   Not anxious or agitated. Neurologic:  No acute neurological deficit. Labs: Results:       Chemistry Recent Labs     04/12/20  0250 04/11/20  0255   * 145*    134*   K 3.4* 3.9    101   CO2 25 26   BUN 33* 32*   CREA 1.23 1.17   CA 8.0* 8.0*   AGAP 10 7   BUCR 27* 27*      CBC w/Diff Recent Labs     04/12/20 0250 04/11/20  0255   WBC 11.2 13.8*   RBC 3.54* 3.57*   HGB 10.1* 10.0*   HCT 30.1* 30.0*    301      Cardiac Enzymes No results for input(s): CPK, CKND1, WILLARD in the last 72 hours. No lab exists for component: CKRMB, TROIP   Coagulation No results for input(s): PTP, INR, APTT, INREXT, INREXT in the last 72 hours. Lipid Panel No results found for: CHOL, CHOLPOCT, CHOLX, CHLST, CHOLV, 031706, HDL, HDLP, LDL, LDLC, DLDLP, 708931, VLDLC, VLDL, TGLX, TRIGL, TRIGP, TGLPOCT, CHHD, CHHDX   BNP No results for input(s): BNPP in the last 72 hours. Liver Enzymes No results for input(s): TP, ALB, TBIL, AP, SGOT, GPT in the last 72 hours.     No lab exists for component: DBIL   Thyroid Studies Lab Results   Component Value Date/Time    TSH 0.86 10/20/2019 01:05 AM        Procedures/imaging: see electronic medical records for all procedures/Xrays and details which were not copied into this note but were reviewed prior to creation of Plan    Xr Chest Port    Result Date: 4/9/2020  EXAM: Jason Mooney chest CLINICAL HISTORY: meets SIRS criteria , COMPARISON: None FINDINGS: Frontal view of the chest demonstrate chronic pleural-based opacities in the right lung, unchanged. Otherwise fairly clear. . Cardiac silhouette is normal in size and contour. No acute bony or soft tissue abnormality. IMPRESSION: No significant interval change or acute pulmonary process identified.        Deshawn Booth MD

## 2020-04-13 NOTE — PROGRESS NOTES
Problem: Self Care Deficits Care Plan (Adult)  Goal: *Acute Goals and Plan of Care (Insert Text)  Description: Occupational Therapy Goals  Initiated 4/11/2020 within 7 day(s). 1.  Patient will perform grooming with supervision/set-up standing at sink for 8 minutes or more. 2.  Patient will perform lower body dressing with supervision/set-up and use of AEs as needed. 3.  Patient will perform toilet transfers with supervision/set-up. 4.  Patient will perform all aspects of toileting with supervision/set-up. 5.  Patient will participate in upper extremity therapeutic exercise/activities with independence for 10 minutes. 6.  Patient will utilize energy conservation techniques during functional activities with verbal, visual and tactile cues. Outcome: Progressing Towards Goal   OCCUPATIONAL THERAPY TREATMENT    Patient: Suzie Purcell (89 y.o. male)  Date: 4/13/2020  Diagnosis: COPD exacerbation (HCC) [J44.1]  Acute respiratory failure with hypoxia and hypercarbia (HCC) [J96.01, J96.02]   COPD exacerbation (HCC)       Precautions: Fall    Chart, occupational therapy assessment, plan of care, and goals were reviewed. ASSESSMENT:  Pt found seated EOB, agreeable to participate in therapy, pt [place]. Pt completed bathroom mobility/toilet transfer with CGA. Pt requires mod A to don B socks, pt reports he owns reacher/sock aid and utilizes at home when necessary. Pt continues to fatigue after short amount of functional activity. Pt left seated EOB, call bell/phone within reach. Progression toward goals:  [x]          Improving appropriately and progressing toward goals  []          Improving slowly and progressing toward goals  []          Not making progress toward goals and plan of care will be adjusted     PLAN:  Patient continues to benefit from skilled intervention to address the above impairments. Continue treatment per established plan of care.   Discharge Recommendations:  Home Health  Further Equipment Recommendations for Discharge: To Be Determined (TBD) at next level of care     SUBJECTIVE:   Patient stated i'm feeling okay.     OBJECTIVE DATA SUMMARY:   Cognitive/Behavioral Status:  Neurologic State: Alert  Orientation Level: Oriented X4  Cognition: Follows commands  Safety/Judgement: Awareness of environment, Fall prevention, Good awareness of safety precautions, Home safety    Balance:  Sitting: Intact  Standing: Intact; With support    ADL Intervention:  Lower Body Dressing Assistance  Dressing Assistance: Moderate assistance  Socks: Moderate assistance  Position Performed: Seated edge of bed    Cognitive Retraining  Orientation Retraining: Awareness of environment  Problem Solving: Deductive reason;General alternative solution  Executive Functions: Managing time  Organizing/Sequencing: Breaking task down;Prioritizing; Two step sequence  Attention to Task: Single task  Maintains Attention For (Time): Greater than 10 minutes  Following Commands: Follows one step commands/directions  Safety/Judgement: Awareness of environment; Fall prevention;Good awareness of safety precautions; Home safety    Pain:  Pain level pre-treatment: 1/10   Pain level post-treatment: 1/10  Pain Intervention(s): Medication administered by RN (see MAR); Rest, Repositioning   Response to intervention: Nurse notified, See doc flow sheet    Activity Tolerance:    Poor+. Pt able to stand ~3 min. Pt limited by activity tolerance, balance, ROM, edema. Please refer to the flowsheet for vital signs taken during this treatment.   After treatment:   []  Patient left in no apparent distress sitting up in chair  [x]  Patient left in no apparent distress seated EOB  [x]  Call bell left within reach  [x]  Nursing notified  []  Caregiver present  []  Bed alarm activated    COMMUNICATION/EDUCATION:   [x] Role of Occupational Therapy in the acute care setting  [x] Home safety education was provided and the patient/caregiver indicated understanding. [x] Patient/family have participated as able in working towards goals and plan of care. [x] Patient/family agree to work toward stated goals and plan of care. [] Patient understands intent and goals of therapy, but is neutral about his/her participation. [] Patient is unable to participate in goal setting and plan of care.       Thank you for this referral.  Joon Bundy OTR/L  Time Calculation: 14 mins

## 2020-04-14 VITALS
OXYGEN SATURATION: 100 % | SYSTOLIC BLOOD PRESSURE: 145 MMHG | WEIGHT: 219.5 LBS | HEIGHT: 68 IN | RESPIRATION RATE: 18 BRPM | TEMPERATURE: 97.4 F | BODY MASS INDEX: 33.27 KG/M2 | DIASTOLIC BLOOD PRESSURE: 63 MMHG | HEART RATE: 69 BPM

## 2020-04-14 PROBLEM — E87.6 HYPOKALEMIA: Status: ACTIVE | Noted: 2020-04-14

## 2020-04-14 LAB
ANION GAP SERPL CALC-SCNC: 10 MMOL/L (ref 3–18)
BUN SERPL-MCNC: 39 MG/DL (ref 7–18)
BUN/CREAT SERPL: 28 (ref 12–20)
CALCIUM SERPL-MCNC: 8 MG/DL (ref 8.5–10.1)
CHLORIDE SERPL-SCNC: 99 MMOL/L (ref 100–111)
CO2 SERPL-SCNC: 27 MMOL/L (ref 21–32)
CREAT SERPL-MCNC: 1.37 MG/DL (ref 0.6–1.3)
GLUCOSE BLD STRIP.AUTO-MCNC: 145 MG/DL (ref 70–110)
GLUCOSE BLD STRIP.AUTO-MCNC: 156 MG/DL (ref 70–110)
GLUCOSE SERPL-MCNC: 230 MG/DL (ref 74–99)
POTASSIUM SERPL-SCNC: 3 MMOL/L (ref 3.5–5.5)
POTASSIUM SERPL-SCNC: 3.5 MMOL/L (ref 3.5–5.5)
SODIUM SERPL-SCNC: 136 MMOL/L (ref 136–145)

## 2020-04-14 PROCEDURE — 74011250637 HC RX REV CODE- 250/637: Performed by: FAMILY MEDICINE

## 2020-04-14 PROCEDURE — 82962 GLUCOSE BLOOD TEST: CPT

## 2020-04-14 PROCEDURE — 74011250636 HC RX REV CODE- 250/636: Performed by: FAMILY MEDICINE

## 2020-04-14 PROCEDURE — 94760 N-INVAS EAR/PLS OXIMETRY 1: CPT

## 2020-04-14 PROCEDURE — 97535 SELF CARE MNGMENT TRAINING: CPT

## 2020-04-14 PROCEDURE — 74011636637 HC RX REV CODE- 636/637: Performed by: FAMILY MEDICINE

## 2020-04-14 PROCEDURE — 74011250637 HC RX REV CODE- 250/637: Performed by: HOSPITALIST

## 2020-04-14 PROCEDURE — 94640 AIRWAY INHALATION TREATMENT: CPT

## 2020-04-14 PROCEDURE — 77010033678 HC OXYGEN DAILY

## 2020-04-14 PROCEDURE — 74011000250 HC RX REV CODE- 250: Performed by: HOSPITALIST

## 2020-04-14 PROCEDURE — 74011000258 HC RX REV CODE- 258: Performed by: HOSPITALIST

## 2020-04-14 PROCEDURE — 36415 COLL VENOUS BLD VENIPUNCTURE: CPT

## 2020-04-14 PROCEDURE — 74011250636 HC RX REV CODE- 250/636: Performed by: HOSPITALIST

## 2020-04-14 PROCEDURE — 84132 ASSAY OF SERUM POTASSIUM: CPT

## 2020-04-14 PROCEDURE — 74011000250 HC RX REV CODE- 250: Performed by: FAMILY MEDICINE

## 2020-04-14 RX ORDER — DILTIAZEM HYDROCHLORIDE 30 MG/1
30 TABLET, FILM COATED ORAL
Qty: 90 TAB | Refills: 0 | Status: SHIPPED | OUTPATIENT
Start: 2020-04-14 | End: 2022-02-05

## 2020-04-14 RX ORDER — POTASSIUM CHLORIDE 20 MEQ/1
40 TABLET, EXTENDED RELEASE ORAL 3 TIMES DAILY
Status: DISCONTINUED | OUTPATIENT
Start: 2020-04-14 | End: 2020-04-14

## 2020-04-14 RX ORDER — POTASSIUM CHLORIDE 20 MEQ/1
20 TABLET, EXTENDED RELEASE ORAL
Status: COMPLETED | OUTPATIENT
Start: 2020-04-14 | End: 2020-04-14

## 2020-04-14 RX ORDER — DOXYCYCLINE 100 MG/1
100 CAPSULE ORAL 2 TIMES DAILY
Qty: 8 CAP | Refills: 0 | Status: SHIPPED | OUTPATIENT
Start: 2020-04-14 | End: 2020-04-18

## 2020-04-14 RX ORDER — PREDNISONE 10 MG/1
TABLET ORAL
Qty: 21 TAB | Refills: 0 | Status: SHIPPED | OUTPATIENT
Start: 2020-04-14 | End: 2020-06-17

## 2020-04-14 RX ORDER — GUAIFENESIN 600 MG/1
600 TABLET, EXTENDED RELEASE ORAL EVERY 12 HOURS
Qty: 8 TAB | Refills: 0 | Status: SHIPPED | OUTPATIENT
Start: 2020-04-14 | End: 2020-04-18

## 2020-04-14 RX ADMIN — GUAIFENESIN 600 MG: 600 TABLET, EXTENDED RELEASE ORAL at 08:38

## 2020-04-14 RX ADMIN — DILTIAZEM HYDROCHLORIDE 30 MG: 30 TABLET, FILM COATED ORAL at 07:02

## 2020-04-14 RX ADMIN — HEPARIN SODIUM 5000 UNITS: 5000 INJECTION INTRAVENOUS; SUBCUTANEOUS at 06:27

## 2020-04-14 RX ADMIN — HYDROCORTISONE: 1 CREAM TOPICAL at 08:39

## 2020-04-14 RX ADMIN — BUDESONIDE 500 MCG: 0.5 INHALANT RESPIRATORY (INHALATION) at 07:08

## 2020-04-14 RX ADMIN — INSULIN LISPRO 2 UNITS: 100 INJECTION, SOLUTION INTRAVENOUS; SUBCUTANEOUS at 07:04

## 2020-04-14 RX ADMIN — METHYLPREDNISOLONE SODIUM SUCCINATE 60 MG: 125 INJECTION, POWDER, FOR SOLUTION INTRAMUSCULAR; INTRAVENOUS at 06:26

## 2020-04-14 RX ADMIN — DOXYCYCLINE 100 MG: 100 INJECTION, POWDER, LYOPHILIZED, FOR SOLUTION INTRAVENOUS at 03:00

## 2020-04-14 RX ADMIN — DILTIAZEM HYDROCHLORIDE 30 MG: 30 TABLET, FILM COATED ORAL at 11:33

## 2020-04-14 RX ADMIN — Medication 10 ML: at 06:13

## 2020-04-14 RX ADMIN — IPRATROPIUM BROMIDE AND ALBUTEROL SULFATE 3 ML: .5; 3 SOLUTION RESPIRATORY (INHALATION) at 07:08

## 2020-04-14 RX ADMIN — IPRATROPIUM BROMIDE AND ALBUTEROL SULFATE 3 ML: .5; 3 SOLUTION RESPIRATORY (INHALATION) at 11:14

## 2020-04-14 RX ADMIN — CHLORTHALIDONE 25 MG: 25 TABLET ORAL at 08:38

## 2020-04-14 RX ADMIN — POTASSIUM CHLORIDE 20 MEQ: 1500 TABLET, EXTENDED RELEASE ORAL at 14:43

## 2020-04-14 RX ADMIN — POTASSIUM CHLORIDE 40 MEQ: 1500 TABLET, EXTENDED RELEASE ORAL at 11:33

## 2020-04-14 NOTE — PROGRESS NOTES
Problem: Self Care Deficits Care Plan (Adult)  Goal: *Acute Goals and Plan of Care (Insert Text)  Description: Occupational Therapy Goals  Initiated 4/11/2020 within 7 day(s). 1.  Patient will perform grooming with supervision/set-up standing at sink for 8 minutes or more. 2.  Patient will perform lower body dressing with supervision/set-up and use of AEs as needed. 3.  Patient will perform toilet transfers with supervision/set-up. 4.  Patient will perform all aspects of toileting with supervision/set-up. 5.  Patient will participate in upper extremity therapeutic exercise/activities with independence for 10 minutes. 6.  Patient will utilize energy conservation techniques during functional activities with verbal, visual and tactile cues. Outcome: Progressing Towards Goal   OCCUPATIONAL THERAPY TREATMENT    Patient: Amish Gonzalez (5 y.o. male)  Date: 4/14/2020  Diagnosis: COPD exacerbation (HCC) [J44.1]  Acute respiratory failure with hypoxia and hypercarbia (HCC) [J96.01, J96.02]   COPD exacerbation (HCC)       Precautions: Fall    Chart, occupational therapy assessment, plan of care, and goals were reviewed. ASSESSMENT:  Pt found seated EOB, attempting to get dressed to get ready for d/c. Pt continues to require max A for sock donning, however pt owns sock aid at home and used PTA. Pt able to don shorts with elastic waist with CGA while standing to pull up to waist. Provided education on home safety/fall prevention strategies, pt has no concerns about ADL performance when home at this time. Pt left seated EOB, phone/call bell within reach. Progression toward goals:  [x]          Improving appropriately and progressing toward goals  []          Improving slowly and progressing toward goals  []          Not making progress toward goals and plan of care will be adjusted     PLAN:  Patient continues to benefit from skilled intervention to address the above impairments.   Continue treatment per established plan of care. Discharge Recommendations:  Home Health  Further Equipment Recommendations for Discharge: To Be Determined (TBD) at next level of care     SUBJECTIVE:   Patient stated i'm leaving soon.     OBJECTIVE DATA SUMMARY:   Cognitive/Behavioral Status:  Neurologic State: Alert  Orientation Level: Oriented X4  Cognition: Appropriate decision making, Appropriate for age attention/concentration, Appropriate safety awareness, Follows commands  Safety/Judgement: Awareness of environment, Fall prevention, Good awareness of safety precautions, Home safety    Balance:  Sitting: Intact    ADL Intervention:  Lower Body Dressing Assistance  Dressing Assistance: Moderate assistance  Underpants: Contact guard assistance  Pants With Elastic Waist: Contact guard assistance  Socks: Maximum assistance  Position Performed: Seated edge of bed    Cognitive Retraining  Safety/Judgement: Awareness of environment; Fall prevention;Good awareness of safety precautions; Home safety    Pain:  Pain level pre-treatment: 0/10   Pain level post-treatment: 0/10  Pain Intervention(s): Medication administered by RN (see MAR); Rest, Repositioning   Response to intervention: Nurse notified, See doc flow sheet    Activity Tolerance:    Fair-. Pt limited by endurance, ROM, edema. Please refer to the flowsheet for vital signs taken during this treatment. After treatment:   []  Patient left in no apparent distress sitting up in chair  [x]  Patient left in no apparent distress seated EOB  [x]  Call bell left within reach  [x]  Nursing notified  []  Caregiver present  []  Bed alarm activated    COMMUNICATION/EDUCATION:   [x] Role of Occupational Therapy in the acute care setting  [x] Home safety education was provided and the patient/caregiver indicated understanding. [x] Patient/family have participated as able in working towards goals and plan of care. [x] Patient/family agree to work toward stated goals and plan of care.   [] Patient understands intent and goals of therapy, but is neutral about his/her participation. [] Patient is unable to participate in goal setting and plan of care.       Thank you for this referral.  Hannah Stout OTR/L  Time Calculation: 10 mins

## 2020-04-14 NOTE — DISCHARGE SUMMARY
Discharge Summary    Patient: Chantel Bonilla MRN: [id number]  CSN: [id number]    YOB: 1945  Age: 66 y.o. Sex: male    DOA: 4/9/2020 LOS:  LOS: 5 days   Discharge Date:      Primary Care Provider:  Dominga Torres MD    Admission Diagnoses: COPD exacerbation ([facility] [J44.1]  Acute respiratory failure with hypoxia and hypercarbia ([facility] [J96.01, J96.02]    Discharge Diagnoses:    Hospital Problems  Date Reviewed: 7/6/2019          Codes Class Noted POA    Advanced care planning/counseling discussion ICD-10-CM: Z71.89  ICD-9-CM: V65.49  Unknown Unknown        Hypokalemia ICD-10-CM: E87.6  ICD-9-CM: 276.8  4/14/2020 Unknown        Hyponatremia ICD-10-CM: E87.1  ICD-9-CM: 276.1  4/10/2020 Unknown        * (Principal) COPD exacerbation ([facility] ICD-10-CM: J44.1  ICD-9-CM: 491.21  4/9/2020 Unknown        Acute respiratory failure with hypoxia and hypercarbia ([facility] ICD-10-CM: J96.01, J96.02  ICD-9-CM: 518.81  4/9/2020 Unknown        Paroxysmal atrial fibrillation ([facility] ICD-10-CM: I48.0  ICD-9-CM: 427.31  8/19/2019 Yes        Asbestosis ([facility] ICD-10-CM: X54  ICD-9-CM: 944  10/19/2018 Yes        Chronic renal disease, stage 3, moderately decreased glomerular filtration rate between 30-59 mL/min/1.73 square meter (HCC) ICD-10-CM: N18.3  ICD-9-CM: 585.3  7/20/2018 Yes        Hypertension ICD-10-CM: I10  ICD-9-CM: 401.9  Unknown Yes              Discharge Condition: stable     Discharge Medications:     Current Discharge Medication List      START taking these medications    Details   dilTIAZem (CARDIZEM) 30 mg tablet Take 1 Tab by mouth Before breakfast, lunch, and dinner. Qty: 90 Tab, Refills: 0      guaiFENesin ER (MUCINEX) 600 mg ER tablet Take 1 Tab by mouth every twelve (12) hours for 4 days. Qty: 8 Tab, Refills: 0      doxycycline (MONODOX) 100 mg capsule Take 1 Cap by mouth two (2) times a day for 4 days.   Qty: 8 Cap, Refills: 0         CONTINUE these medications which have CHANGED    Details   predniSONE (STERAPRED DS) 10 mg dose pack See administration instruction per 10mg dose pack  Qty: 21 Tab, Refills: 0         CONTINUE these medications which have NOT CHANGED    Details   chlorthalidone (HYGROTEN) 25 mg tablet Take 25 mg by mouth daily. acetaminophen (TYLENOL) 325 mg tablet Take 500 mg by mouth every four (4) hours as needed for Pain. OXYGEN-AIR DELIVERY SYSTEMS Take 2 L by inhalation continuous. diphenhydrAMINE (BENADRYL) 25 mg capsule Take 25 mg by mouth nightly as needed for Itching. albuterol-ipratropium (DUO-NEB) 2.5 mg-0.5 mg/3 ml nebu 3 mL by Nebulization route every four (4) hours as needed. Qty: 30 Nebule, Refills: 0      albuterol (PROVENTIL HFA, VENTOLIN HFA, PROAIR HFA) 90 mcg/actuation inhaler Take 2 Puffs by inhalation every four (4) hours as needed for Wheezing or Shortness of Breath. Qty: 1 Inhaler, Refills: 1      ipratropium (ATROVENT) 0.03 % nasal spray 2 Sprays by Both Nostrils route every twelve (12) hours as needed for Rhinitis. tamsulosin (FLOMAX) 0.4 mg capsule Take 0.4 mg by mouth every evening. dextran 70/hypromellose (ARTIFICIAL TEARS, PF, OP) Apply 2 Drops to eye as needed for Other (both eyes for dryness). Procedures : none    Consults: None      PHYSICAL EXAM   Visit Vitals  /66   Pulse 73   Temp 97.6 °F (36.4 °C)   Resp 18   Ht 5' 8\" (1.727 m)   Wt 99.6 kg (219 lb 8 oz)   SpO2 100%   BMI 33.37 kg/m²     General: Awake, cooperative, no acute distress    HEENT: NC, Atraumatic. PERRLA, EOMI. Anicteric sclerae. Lungs:  CTA Bilaterally. No Wheezing/Rhonchi/Rales. Heart:  Regular  rhythm,  No murmur, No Rubs, No Gallops  Abdomen: Soft, Non distended, Non tender. +Bowel sounds,   Extremities: No c/c/e  Psych:   Not anxious or agitated. Neurologic:  No acute neurological deficits.                                      Admission HPI :   Yann Stewart is a 66 y.o. male with past medical h/o COPD on chronic 2 L of oxygen at home, hypertension, CKD Stage III, Asbestosis, PAF (was on eliquis, but discontinued), and tobacco dependence presents to the emergency department via EMS in respiratory distress.  EMS reports that when they arrived patient was attempting to give himself a nebulizer treatment and stated that it was not helping.  Patient was 90% to 93% on room air.  Was transported on 4 L and saturating in the high 90s. Pt reports that he has been staying home with his wife and practicing social distancing. No recent travel. He has had no known COVID exposures or risk factors    Hospital Course :   Amish Gonzalez is a 66 y.o. male with past medical h/o COPD on chronic 2 L of oxygen at home, hypertension, CKD Stage III, Asbestosis, PAF (was on eliquis, but discontinued), and tobacco dependence was admitted due to copd exacerbation. He was put on high flow O2 on admission, he was able to wean off after iv steroid/breathing tx given. Since pt  was admitted, iv steroid/breathing treatment were administrated with empiric iv abx. Glucose was controlled per SSI. With the treatment, sob resolved and nc O2 remained home O2 level. We also give his K replacement for hypokalemia. His ckd3 is stable       Discharge planning discussed with patient, pt agrees  with the plan and no questions and concerns at this point.        Activity: Activity as tolerated    Diet: Cardiac Diet    Follow-up: PCP and Dr. Genna Luna     Disposition: home     Minutes spent on discharge: 45 min       Labs: Results:       Chemistry Recent Labs     04/14/20  1355 04/14/20  0945 04/13/20  1409 04/12/20  0250   GLU  --  230* 163* 153*   NA  --  136 135* 137   K 3.5 3.0* 3.0* 3.4*   CL  --  99* 100 102   CO2  --  27 27 25   BUN  --  39* 35* 33*   CREA  --  1.37* 1.34* 1.23   CA  --  8.0* 8.1* 8.0*   AGAP  --  10 8 10   BUCR  --  28* 26* 27*      CBC w/Diff Recent Labs     04/12/20  0250   WBC 11.2   RBC 3.54*   HGB 10.1*   HCT 30.1*         Cardiac Enzymes No results for input(s): CPK, CKND1, WILLARD in the last 72 hours. No lab exists for component: CKRMB, TROIP   Coagulation No results for input(s): PTP, INR, APTT, INREXT, INREXT in the last 72 hours. Lipid Panel No results found for: CHOL, CHOLPOCT, CHOLX, CHLST, CHOLV, 162200, HDL, HDLP, LDL, LDLC, DLDLP, 442539, VLDLC, VLDL, TGLX, TRIGL, TRIGP, TGLPOCT, CHHD, CHHDX   BNP No results for input(s): BNPP in the last 72 hours. Liver Enzymes No results for input(s): TP, ALB, TBIL, AP, SGOT, GPT in the last 72 hours. No lab exists for component: DBIL   Thyroid Studies Lab Results   Component Value Date/Time    TSH 0.86 10/20/2019 01:05 AM          @micro    Significant Diagnostic Studies: Xr Chest Port    Result Date: 4/9/2020  EXAM: One-view chest CLINICAL HISTORY: meets SIRS criteria , COMPARISON: None FINDINGS: Frontal view of the chest demonstrate chronic pleural-based opacities in the right lung, unchanged. Otherwise fairly clear. . Cardiac silhouette is normal in size and contour. No acute bony or soft tissue abnormality. IMPRESSION: No significant interval change or acute pulmonary process identified.              [facility]     CC: Marina Escoto MD

## 2020-04-14 NOTE — PROGRESS NOTES
Problem: Falls - Risk of  Goal: *Absence of Falls  Description: Document Lynn Hernandez Fall Risk and appropriate interventions in the flowsheet.   Outcome: Progressing Towards Goal  Note: Fall Risk Interventions:  Mobility Interventions: Communicate number of staff needed for ambulation/transfer, Patient to call before getting OOB, Utilize walker, cane, or other assistive device         Medication Interventions: Assess postural VS orthostatic hypotension, Patient to call before getting OOB, Teach patient to arise slowly    Elimination Interventions: Call light in reach, Patient to call for help with toileting needs, Urinal in reach

## 2020-04-14 NOTE — PROGRESS NOTES
Transition of care: Anticipate d/c home  Telephone call with patient ( due to covid restrictions did not enter room)states he plans to d/c home today. Patient reports he uses home oxygen he wears at 2LPM via [place]. He gets his home oxygen supplier is [employer]. Patient lives his wife and adult son. Patient declines rehab or HHC. States his wife does not want anyone coming into her home. Patient pcp is Dr. Latha Angulo. Patient has medicare for insurance. these medications from [id number] [facility] [address] [facility], [place]    dilTIAZem    doxycycline    guaiFENesin ER    predniSONE      Follow up with Curt Almodovar MD    Specialty: [address]   [phone]      Follow up with Curt Almodovar MD in 3 day(s)    Specialty: [address]   [phone]      Follow up with Sandra Drew MD in 1 week(s)    Specialty: Pulmonary Disease    [address] [place] [address]   [phone]    cms will assist with appointments. Patient denies othr needs from cm  Care Management Interventions  PCP Verified by CM: Yes  Mode of Transport at Discharge:  Other (see comment)(family)  Transition of Care Consult (CM Consult): Discharge Planning  Current Support Network: Lives with Spouse  Confirm Follow Up Transport: Family

## 2020-04-14 NOTE — PROGRESS NOTES
Bedside shift change report given to [address] [place] [address] (oncoming nurse) by Keshav Noland RN (offgoing nurse). Report included the following information SBAR, Kardex, ED Summary, Intake/Output, MAR and Accordion. 0840 Shift assessment complete. 1610 Discharge instructions reviewed the patient. All questions addressed. Shift Summary: Shift uneventful. No complaints of chest pain or shortness of breath.

## 2020-04-14 NOTE — CONSULTS
[facility]: [phone] ([phone])  [facility]: [phone]   [facility]: [phone]    Patient Name: Kana Lyon  YOB: 1945    Date of Initial Consult: 04/14/2020  Reason for Consult: goals of care  Requesting Provider: Mandie Casillas MD   Primary Care Physician: Steph Ramos MD      SUMMARY:   Kana Lyon is a 66 y.o. male with a past history of HTN, brain aneurysm, prostate cancer, COPD, asbestosis, a-fib, chronic respiratory failure on home O2, CKD3 who was admitted on 4/9/2020 from home with a diagnosis of COPD exacerbation. Current medical issues leading to Palliative Medicine involvement include: COPD, asbestosis, chronic respiratory failure and goals of care. PALLIATIVE DIAGNOSES:   1. Advanced care decisions  2. COPD exacerbation  3. Acute on chronic respiratory failure  4. Debility     PLAN:   1. Advanced care decisions: Palliative care team including TAMI Esparza and I met with patient at bedside. No visitors present per policy. Patient is awake, alert and oriented. AMD is on file which names his wife, Yajaira Lux and his daughter, Daisy Mccain, as his surrogate healthcare decision makers. We were able to confirm this has not changed. We also discussed code status with the patient including benefits/risks of CPR. Patient was consistent in repeating his goals as unchanged from previous conversations. Patient states \"if there's something that can be done, do it\". We confirmed that includes CPR and intubation. Discussed that as disease progresses, additional conversations would be necessary. NO change in GOALS OF CARE: FULL CODE with FULL INTERVENTIONS. Has POST on file: Okay with CPR and intubation for up to 2 weeks. 2. COPD exacerbation: presented to the ED via EMS in respiratory distress. Was unable to achieve relief with nebulizer treatment at home.  Started on steroids, albuterol inhaler, and scheduled nebulizers. Primary team managing. 3. Acute on chronic respiratory failure: On home oxygen of 2 L. Likely secondary to #2 above. Weaned from HFNC now. 4. Debility: Secondary to chronic respiratory failure. Also with chronic numbness BLE. PT/OT ordered. 5. Initial consult note routed to primary continuity provider  6. Communicated plan of care with: Palliative IDT    GOALS OF CARE:  Patient/Health Care Proxy Stated Goals: Prolong life      TREATMENT PREFERENCES:   Code Status: Full Code    Advance Care Planning:  Advance Care Planning 4/9/2020   Patient's Healthcare Decision Maker is: -   Primary Decision Maker Name -   Primary Decision Maker Phone Number -   Primary Decision Maker Relationship to Patient -   Confirm Advance Directive Yes, on file   Patient Would Like to Complete Advance Directive -   Does the patient have other document types -       Medical Interventions: Full interventions         Other: As far as possible, the palliative care team has discussed with patient / health care proxy about goals of care / treatment preferences for patient. HISTORY:     History obtained from: chart    CHIEF COMPLAINT: shortness of breath    HPI/SUBJECTIVE:    The patient is:   [x] Verbal and participatory  [] Non-participatory due to:   Patient admits to ongoing shortness of breath. He has some limitations in speaking full sentences. He admits to chronic pain in BLE with numbness in his feet. He denies any nausea.      Clinical Pain Assessment (nonverbal scale for nonverbal patients): Clinical Pain Assessment  Severity: 3  Location: bilateral feet  Character: numb  Duration: years  Frequency: constant          Duration: for how long has pt been experiencing pain (e.g., 2 days, 1 month, years)  Frequency: how often pain is an issue (e.g., several times per day, once every few days, constant)     FUNCTIONAL ASSESSMENT:     Palliative Performance Scale (PPS):  PPS: 50    ECOG  ECOG Status : Limited self-care PSYCHOSOCIAL/SPIRITUAL SCREENING:      Any spiritual / Restorationism concerns:  [] Yes /  [x] No    Caregiver Burnout:  [] Yes /  [] No /  [x] No Caregiver Present      Anticipatory grief assessment:   [x] Normal  / [] Maladaptive        REVIEW OF SYSTEMS:     Positive and pertinent negative findings in ROS are noted above in HPI. The following systems were [x] reviewed / [] unable to be reviewed as noted in HPI  Other findings are noted below. Systems: constitutional, ears/nose/mouth/throat, respiratory, gastrointestinal, genitourinary, musculoskeletal, integumentary, neurologic, psychiatric, endocrine. Positive findings noted below. Modified ESAS Completed by: provider           Pain: 3     Nausea: 0     Dyspnea: 6                    PHYSICAL EXAM:     Wt Readings from Last 3 Encounters:   04/14/20 99.6 kg (219 lb 8 oz)   02/25/20 96.2 kg (212 lb)   02/20/20 90.7 kg (200 lb)     Blood pressure 146/66, pulse 73, temperature 97.6 °F (36.4 °C), resp. rate 18, height 5' 8\" (1.727 m), weight 99.6 kg (219 lb 8 oz), SpO2 100 %. Pain:  Pain Scale 1: Numeric (0 - 10)  Pain Intensity 1: 0     Pain Location 1: Knee  Pain Orientation 1: Left  Pain Description 1: Aching  Pain Intervention(s) 1: Medication (see MAR)    Constitutional: Chronically ill, elderly male, sitting up side of bed in mild respiratory distress. Eyes: pupils equal, anicteric  ENMT: no nasal discharge, moist mucous membranes  Cardiovascular: BLE moderate edema.    Respiratory: breathing not labored, symmetric  Gastrointestinal: soft non-tender   Musculoskeletal: no deformity, no tenderness to palpation  Skin: warm, dry  Neurologic: following commands, moving all extremities  Psychiatric: full affect, no hallucinations       HISTORY:     Principal Problem:    COPD exacerbation ([facility] [address] [facility] (4/9/2020)    Active Problems:    Hypertension ()      Chronic renal disease, stage 3, moderately decreased glomerular filtration rate between 30-59 mL/min/1.73 square meter ([facility] (7/20/2018)      Asbestosis ([facility] (10/19/2018)      Paroxysmal atrial fibrillation ([facility] (8/19/2019)      Acute respiratory failure with hypoxia and hypercarbia ([facility] (4/9/2020)      Hyponatremia (4/10/2020)      Past Medical History:   Diagnosis Date    Brain aneurysm     Cancer [facility])     prostate    COPD (chronic obstructive pulmonary disease) ([facility]     Hypertension     Nocturia     Pneumonia     Radiation effect       Past Surgical History:   Procedure Laterality Date    HX HERNIA REPAIR        Family History   Problem Relation Age of Onset    Heart Disease Mother      History reviewed, no pertinent family history.   Social History     Tobacco Use    Smoking status: Former Smoker     Types: Cigarettes    Smokeless tobacco: Never Used   Substance Use Topics    Alcohol use: No     Allergies   Allergen Reactions    Aspirin Other (comments)     \"messes my stomach up\"      Current Facility-Administered Medications   Medication Dose Route Frequency    potassium chloride (K-DUR, KLOR-CON) SR tablet 40 mEq  40 mEq Oral TID    methylPREDNISolone (PF) (Solu-MEDROL) injection 60 mg  60 mg IntraVENous Q8H    albuterol-ipratropium (DUO-NEB) 2.5 MG-0.5 MG/3 ML  3 mL Nebulization Q4H RT    guaiFENesin ER (MUCINEX) tablet 600 mg  600 mg Oral Q12H    chlorthalidone (HYGROTEN) tablet 25 mg  25 mg Oral DAILY    tamsulosin (FLOMAX) capsule 0.4 mg  0.4 mg Oral QPM    budesonide (PULMICORT) 500 mcg/2 ml nebulizer suspension  500 mcg Nebulization BID RT    hydrocortisone (CORTAID) 1 % cream   Topical BID    doxycycline (VIBRAMYCIN) 100 mg in 0.9% sodium chloride (MBP/ADV) 100 mL MBP  100 mg IntraVENous Q12H    dilTIAZem (CARDIZEM) IR tablet 30 mg  30 mg Oral TIDAC    sodium chloride (OCEAN) 0.65 % nasal squeeze bottle 2 Spray  2 Spray Both Nostrils Q2H PRN    sodium chloride (NS) flush 5-10 mL  5-10 mL IntraVENous PRN    sodium chloride (NS) flush 5-40 mL  5-40 mL IntraVENous Q8H    sodium chloride (NS) flush 5-40 mL  5-40 mL IntraVENous PRN    acetaminophen (TYLENOL) tablet 650 mg  650 mg Oral Q4H PRN    HYDROcodone-acetaminophen (NORCO) 5-325 mg per tablet 1 Tab  1 Tab Oral Q4H PRN    naloxone (NARCAN) injection 0.4 mg  0.4 mg IntraVENous PRN    ondansetron (ZOFRAN) injection 4 mg  4 mg IntraVENous Q4H PRN    heparin (porcine) injection 5,000 Units  5,000 Units SubCUTAneous Q8H    insulin lispro (HUMALOG) injection   SubCUTAneous AC&HS    glucose chewable tablet 16 g  16 g Oral PRN    glucagon (GLUCAGEN) injection 1 mg  1 mg IntraMUSCular PRN    dextrose 10% infusion 125-250 mL  125-250 mL IntraVENous PRN        LAB AND IMAGING FINDINGS:     Lab Results   Component Value Date/Time    WBC 11.2 04/12/2020 02:50 AM    HGB 10.1 (L) 04/12/2020 02:50 AM    PLATELET 944 20/88/5499 02:50 AM     Lab Results   Component Value Date/Time    Sodium 136 04/14/2020 09:45 AM    Potassium 3.0 (L) 04/14/2020 09:45 AM    Chloride 99 (L) 04/14/2020 09:45 AM    CO2 27 04/14/2020 09:45 AM    BUN 39 (H) 04/14/2020 09:45 AM    Creatinine 1.37 (H) 04/14/2020 09:45 AM    Calcium 8.0 (L) 04/14/2020 09:45 AM    Magnesium 2.1 04/13/2020 02:09 PM    Phosphorus 4.2 10/21/2019 01:15 AM      Lab Results   Component Value Date/Time    AST (SGOT) 14 04/09/2020 07:45 PM    Alk.  phosphatase 107 04/09/2020 07:45 PM    Protein, total 6.5 04/09/2020 07:45 PM    Albumin 3.2 (L) 04/09/2020 07:45 PM    Globulin 3.3 04/09/2020 07:45 PM     Lab Results   Component Value Date/Time    INR 0.9 07/09/2019 05:20 AM    Prothrombin time 12.3 07/09/2019 05:20 AM    aPTT 110.0 (H) 11/01/2019 04:40 AM      No results found for: IRON, FE, TIBC, IBCT, PSAT, FERR   No results found for: PH, PCO2, PO2  No components found for: [place]   Lab Results   Component Value Date/Time     04/09/2020 07:45 PM    CK - MB 3.6 (H) 04/09/2020 07:45 PM              Total time: 70 minutes  Counseling / coordination time, spent as noted above > 50% counseling / coordination: patient, RN    Prolonged service was provided for  []30 min   []75 min in face to face time in the presence of the patient, spent as noted above. Time Start:   Time End:   Note: this can only be billed with 62331 (initial) or 70931 (follow up). If multiple start / stop times, list each separately.

## 2020-04-14 NOTE — ACP (ADVANCE CARE PLANNING)
Mr. Cecilia Alczaar has completed advanced directive on file naming wife Ernie Lew primary healthcare agent then daughter Taylor Moss secondary healthcare agent. Also has POST form stating FULL code, aggressive care (two week period of intubation but no trach), and trail period of feeding tubes. PM team (GEMMA Keller and this MSW) met with patient and Mr. Cecilia Alcazar confirmed his wishes/goals of care are unchanged since documents have been completed. He remains FULL code and aggressive care at this time.     Code status: FULL    MPOA: Ernie Lew (wife)  [phone]    2nd MPOA: Taylor Moss (daughter)  [phone]

## 2020-04-14 NOTE — DISCHARGE INSTRUCTIONS
DISCHARGE SUMMARY from Nurse    PATIENT INSTRUCTIONS:    What to do at Home:  Recommended activity: Activity as tolerated. *  Please give a list of your current medications to your Primary Care Provider. *  Please update this list whenever your medications are discontinued, doses are      changed, or new medications (including over-the-counter products) are added. *  Please carry medication information at all times in case of emergency situations. These are general instructions for a healthy lifestyle:    No smoking/ No tobacco products/ Avoid exposure to second hand smoke  Surgeon General's Warning:  Quitting smoking now greatly reduces serious risk to your health. Obesity, smoking, and sedentary lifestyle greatly increases your risk for illness    A healthy diet, regular physical exercise & weight monitoring are important for maintaining a healthy lifestyle    You may be retaining fluid if you have a history of heart failure or if you experience any of the following symptoms:  Weight gain of 3 pounds or more overnight or 5 pounds in a week, increased swelling in our hands or feet or shortness of breath while lying flat in bed. Please call your doctor as soon as you notice any of these symptoms; do not wait until your next office visit. The discharge information has been reviewed with the patient. The patient verbalized understanding. Discharge medications reviewed with the patient and appropriate educational materials and side effects teaching were provided.   ___________________________________________________________________________________________________________________________________

## 2020-04-15 ENCOUNTER — PATIENT OUTREACH (OUTPATIENT)
Dept: CASE MANAGEMENT | Age: 75
End: 2020-04-15

## 2020-04-15 NOTE — PROGRESS NOTES
CTN attempted to contact patient re:  discharge and COVID-19 risk     Patient was unable to be reached via telephone call.

## 2020-04-16 ENCOUNTER — PATIENT OUTREACH (OUTPATIENT)
Dept: CASE MANAGEMENT | Age: 75
End: 2020-04-16

## 2020-04-16 NOTE — PROGRESS NOTES
Care Transitions Nurse ( CTN) called patient for follow up. A female answered and stated that patient is using his nebulizer machine. CTN to call back at a later time or date for follow up.

## 2020-04-17 ENCOUNTER — PATIENT OUTREACH (OUTPATIENT)
Dept: CASE MANAGEMENT | Age: 75
End: 2020-04-17

## 2020-04-17 NOTE — PROGRESS NOTES
Care Transitions Nurse ( CTN) attempted to contact patient  regarding recent discharge and COVID-19 risk. Patient's listed home phone number is not in service. CTN left a voicemail message requesting a non-emergency return telephone call to CTN. CTN contact information provided. Voicemail message was left on the phone number for Ms. Harriet Connelly. CTN attempted to contact Ms. Khushi Chance. A message was heard indicating this was a business phone number. No voicemail message was left.

## 2020-05-05 ENCOUNTER — APPOINTMENT (OUTPATIENT)
Dept: GENERAL RADIOLOGY | Age: 75
End: 2020-05-05
Attending: EMERGENCY MEDICINE
Payer: COMMERCIAL

## 2020-05-05 ENCOUNTER — HOSPITAL ENCOUNTER (EMERGENCY)
Age: 75
Discharge: HOME | End: 2020-05-05
Attending: EMERGENCY MEDICINE
Payer: COMMERCIAL

## 2020-05-05 VITALS
HEART RATE: 103 BPM | SYSTOLIC BLOOD PRESSURE: 123 MMHG | DIASTOLIC BLOOD PRESSURE: 71 MMHG | WEIGHT: 200 LBS | OXYGEN SATURATION: 100 % | TEMPERATURE: 98.5 F | BODY MASS INDEX: 30.31 KG/M2 | RESPIRATION RATE: 18 BRPM | HEIGHT: 68 IN

## 2020-05-05 DIAGNOSIS — Z20.822 SUSPECTED COVID-19 VIRUS INFECTION: ICD-10-CM

## 2020-05-05 DIAGNOSIS — R06.02 SOB (SHORTNESS OF BREATH): Primary | ICD-10-CM

## 2020-05-05 LAB
ALBUMIN SERPL-MCNC: 2.9 G/DL (ref 3.4–5)
ALBUMIN/GLOB SERPL: 1 {RATIO} (ref 0.8–1.7)
ALP SERPL-CCNC: 117 U/L (ref 45–117)
ALT SERPL-CCNC: 21 U/L (ref 16–61)
ANION GAP SERPL CALC-SCNC: 6 MMOL/L (ref 3–18)
ARTERIAL PATENCY WRIST A: YES
AST SERPL-CCNC: 13 U/L (ref 10–38)
BASE EXCESS BLD CALC-SCNC: 0 MMOL/L
BASOPHILS # BLD: 0 K/UL (ref 0–0.1)
BASOPHILS NFR BLD: 1 % (ref 0–2)
BDY SITE: ABNORMAL
BILIRUB SERPL-MCNC: 0.3 MG/DL (ref 0.2–1)
BNP SERPL-MCNC: 59 PG/ML (ref 0–1800)
BUN SERPL-MCNC: 17 MG/DL (ref 7–18)
BUN/CREAT SERPL: 13 (ref 12–20)
CALCIUM SERPL-MCNC: 8.2 MG/DL (ref 8.5–10.1)
CHLORIDE SERPL-SCNC: 101 MMOL/L (ref 100–111)
CK MB CFR SERPL CALC: 4.6 % (ref 0–4)
CK MB SERPL-MCNC: 4 NG/ML (ref 5–25)
CK SERPL-CCNC: 87 U/L (ref 39–308)
CO2 SERPL-SCNC: 27 MMOL/L (ref 21–32)
CREAT SERPL-MCNC: 1.28 MG/DL (ref 0.6–1.3)
D DIMER PPP FEU-MCNC: 0.53 UG/ML(FEU)
DIFFERENTIAL METHOD BLD: ABNORMAL
EOSINOPHIL # BLD: 0.7 K/UL (ref 0–0.4)
EOSINOPHIL NFR BLD: 11 % (ref 0–5)
ERYTHROCYTE [DISTWIDTH] IN BLOOD BY AUTOMATED COUNT: 14.7 % (ref 11.6–14.5)
GAS FLOW.O2 O2 DELIVERY SYS: ABNORMAL L/MIN
GAS FLOW.O2 SETTING OXYMISER: 2 L/M
GLOBULIN SER CALC-MCNC: 3 G/DL (ref 2–4)
GLUCOSE SERPL-MCNC: 109 MG/DL (ref 74–99)
HCO3 BLD-SCNC: 24.5 MMOL/L (ref 22–26)
HCT VFR BLD AUTO: 31.7 % (ref 36–48)
HGB BLD-MCNC: 10.7 G/DL (ref 13–16)
LYMPHOCYTES # BLD: 1 K/UL (ref 0.9–3.6)
LYMPHOCYTES NFR BLD: 16 % (ref 21–52)
MCH RBC QN AUTO: 28.5 PG (ref 24–34)
MCHC RBC AUTO-ENTMCNC: 33.8 G/DL (ref 31–37)
MCV RBC AUTO: 84.3 FL (ref 74–97)
MONOCYTES # BLD: 0.7 K/UL (ref 0.05–1.2)
MONOCYTES NFR BLD: 11 % (ref 3–10)
NEUTS SEG # BLD: 3.7 K/UL (ref 1.8–8)
NEUTS SEG NFR BLD: 61 % (ref 40–73)
O2/TOTAL GAS SETTING VFR VENT: 28 %
PCO2 BLD: 37.9 MMHG (ref 35–45)
PH BLD: 7.42 [PH] (ref 7.35–7.45)
PLATELET # BLD AUTO: 267 K/UL (ref 135–420)
PMV BLD AUTO: 8.8 FL (ref 9.2–11.8)
PO2 BLD: 150 MMHG (ref 80–100)
POTASSIUM SERPL-SCNC: 3.7 MMOL/L (ref 3.5–5.5)
PROT SERPL-MCNC: 5.9 G/DL (ref 6.4–8.2)
RBC # BLD AUTO: 3.76 M/UL (ref 4.7–5.5)
SAO2 % BLD: 99 % (ref 92–97)
SERVICE CMNT-IMP: ABNORMAL
SODIUM SERPL-SCNC: 134 MMOL/L (ref 136–145)
SPECIMEN TYPE: ABNORMAL
TROPONIN I SERPL-MCNC: <0.02 NG/ML (ref 0–0.04)
WBC # BLD AUTO: 6.1 K/UL (ref 4.6–13.2)

## 2020-05-05 PROCEDURE — 85379 FIBRIN DEGRADATION QUANT: CPT

## 2020-05-05 PROCEDURE — 99284 EMERGENCY DEPT VISIT MOD MDM: CPT

## 2020-05-05 PROCEDURE — 94762 N-INVAS EAR/PLS OXIMTRY CONT: CPT

## 2020-05-05 PROCEDURE — 93005 ELECTROCARDIOGRAM TRACING: CPT

## 2020-05-05 PROCEDURE — 82803 BLOOD GASES ANY COMBINATION: CPT

## 2020-05-05 PROCEDURE — 83880 ASSAY OF NATRIURETIC PEPTIDE: CPT

## 2020-05-05 PROCEDURE — 85025 COMPLETE CBC W/AUTO DIFF WBC: CPT

## 2020-05-05 PROCEDURE — 36600 WITHDRAWAL OF ARTERIAL BLOOD: CPT

## 2020-05-05 PROCEDURE — 82550 ASSAY OF CK (CPK): CPT

## 2020-05-05 PROCEDURE — 80053 COMPREHEN METABOLIC PANEL: CPT

## 2020-05-05 PROCEDURE — 71045 X-RAY EXAM CHEST 1 VIEW: CPT

## 2020-05-05 RX ORDER — IPRATROPIUM BROMIDE AND ALBUTEROL SULFATE 2.5; .5 MG/3ML; MG/3ML
3 SOLUTION RESPIRATORY (INHALATION)
Qty: 30 NEBULE | Refills: 0 | Status: SHIPPED | OUTPATIENT
Start: 2020-05-05 | End: 2020-05-06

## 2020-05-05 NOTE — DISCHARGE INSTRUCTIONS
Patient Education   Coronavirus ([id number]): Care Instructions  Overview  The coronavirus disease (COVID-19) is caused by a virus. It causes a fever, a cough, and shortness of breath. It mainly spreads person-to-person through droplets from coughing and sneezing. The virus also can spread when people are in close contact with someone who is infected. Most people have mild symptoms and can take care of themselves at home. If their symptoms get worse, they may need care in a hospital. There is no medicine to fight the virus. It's important to not spread the virus to others. You need to isolate yourself while you are sick. Your doctor will tell you when you no longer need to be isolated. Leave your home only if you need to get medical care. Follow-up care is a key part of your treatment and safety. Be sure to make and go to all appointments, and call your doctor if you are having problems. It's also a good idea to know your test results and keep a list of the medicines you take. How can you care for yourself at home? · Get extra rest. It can help you feel better. · Drink plenty of fluids. This helps replace fluids lost from fever. Fluids also help ease a scratchy throat. Water, soup, fruit juice, and hot tea with lemon are good choices. · Take acetaminophen (such as Tylenol) to reduce a fever. It may also help with muscle aches. Read and follow all instructions on the label. · Sponge your body with lukewarm water to help with fever. Don't use cold water or ice. · Use petroleum jelly on sore skin. This can help if the skin around your nose and lips becomes sore from rubbing a lot with tissues. Tips for isolation  · Wear a face mask, if you have one, when you are around other people. It can help stop the spread of the virus when you cough or sneeze. · Limit contact with people in your home. If possible, stay in a separate bedroom and use a separate bathroom.   · Avoid contact with pets and other animals. · Cover your mouth and nose with a tissue when you cough or sneeze. Then throw it in the trash right away. · Wash your hands often, especially after you cough or sneeze. Use soap and water, and scrub for at least 20 seconds. If soap and water aren't available, use an alcohol-based hand . · Don't share personal household items. These include bedding, towels, cups and glasses, and eating utensils. · Clean and disinfect your home every day. Use household  and disinfectant wipes or sprays. Take special care to clean things that you grab with your hands. These include doorknobs, remote controls, phones, and handles on your refrigerator and microwave. And don't forget countertops, tabletops, bathrooms, and computer keyboards. When should you call for help? Call 911 anytime you think you may need emergency care. For example, call if you have life-threatening symptoms, such as:    · You have severe trouble breathing. (You can't talk at all.)     · You have constant chest pain or pressure.     · You are severely dizzy or lightheaded.     · You are confused or can't think clearly.     · Your face and lips have a blue color.     · You pass out (lose consciousness) or are very hard to wake up.     Call your doctor now or seek immediate medical care if:    · You have moderate trouble breathing. (You can't speak a full sentence.)     · You are coughing up blood (more than about 1 teaspoon).     · You have signs of low blood pressure. These include feeling lightheaded; being too weak to stand; and having cold, pale, clammy skin.    Watch closely for changes in your health, and be sure to contact your doctor if:    · Your symptoms get worse.     · You are not getting better as expected.     Call before you go to the doctor's office. Follow their instructions. And wear a face mask if you have one.   Current as of: April 1, 2020               Content Version: 12.4  © 1215-9209 [employer]. Care instructions adapted under license by your healthcare professional. If you have questions about a medical condition or this instruction, always ask your healthcare professional. [place] [address] any warranty or liability for your use of this information. Patient Education        Shortness of Breath: Care Instructions  Your Care Instructions  Shortness of breath has many causes. Sometimes conditions such as anxiety can lead to shortness of breath. Some people get mild shortness of breath when they exercise. Trouble breathing also can be a symptom of a serious problem, such as asthma, lung disease, emphysema, heart problems, and pneumonia. If your shortness of breath continues, you may need tests and treatment. Watch for any changes in your breathing and other symptoms. Follow-up care is a key part of your treatment and safety. Be sure to make and go to all appointments, and call your doctor if you are having problems. It's also a good idea to know your test results and keep a list of the medicines you take. How can you care for yourself at home? · Do not smoke or allow others to smoke around you. If you need help quitting, talk to your doctor about stop-smoking programs and medicines. These can increase your chances of quitting for good. · Get plenty of rest and sleep. · Take your medicines exactly as prescribed. Call your doctor if you think you are having a problem with your medicine. · Find healthy ways to deal with stress. ? Exercise daily. ? Get plenty of sleep. ? Eat regularly and well. When should you call for help? Call 911 anytime you think you may need emergency care. For example, call if:    · You have severe shortness of breath.     · You have symptoms of a heart attack. These may include:  ? Chest pain or pressure, or a strange feeling in the chest.  ? Sweating. ? Shortness of breath. ? Nausea or vomiting.   ? Pain, pressure, or a strange feeling in the back, neck, jaw, or upper belly or in one or both shoulders or arms. ? Lightheadedness or sudden weakness. ? A fast or irregular heartbeat. After you call  911, the  may tell you to chew 1 adult-strength or 2 to 4 low-dose aspirin. Wait for an ambulance. Do not try to drive yourself.    Call your doctor now or seek immediate medical care if:    · Your shortness of breath gets worse or you start to wheeze. Wheezing is a high-pitched sound when you breathe.     · You wake up at night out of breath or have to prop your head up on several pillows to breathe.     · You are short of breath after only light activity or while at rest.    Watch closely for changes in your health, and be sure to contact your doctor if:    · You do not get better over the next 1 to 2 days. Where can you learn more? Go to http://ifeoma-jana.info/  Enter S780 in the search box to learn more about \"Shortness of Breath: Care Instructions. \"  Current as of: June 9, 2019Content Version: 12.4  © 1987-0154 Healthwise[employer] Incorporated. Care instructions adapted under license by [employer] (which disclaims liability or warranty for this information). If you have questions about a medical condition or this instruction, always ask your healthcare professional. [address] any warranty or liability for your use of this information.

## 2020-05-05 NOTE — ED NOTES
PT D/c'd home with suspected COVID19, extensive teaching r/t self isolation. Also teaching done with his wife. Understanding verbalized. To f/u per orders. Aware RX to be picked up. Reports breathing easier now. Home ambulatory with walker. Pt reports he is on O@ at home, but he reports he has no portable O2, so he traveled to ED and will travel home without O2. Encouraged to call his rental company and inquire about acquisition of a portable tank. Assisted to car in a w/c  I have reviewed discharge instructions with the patient and spouse. The patient and spouse verbalized understanding. Discharge medications reviewed with patient and spouse and appropriate educational materials and side effects teaching were provided.   Follow-up Information     Follow up With Specialties Details Why Contact Info    Yvonne Raphael MD Family Practice Schedule an appointment as soon as possible for a visit  [address] [address]  [phone]      THE [facility] EMERGENCY DEPT Emergency Medicine  If symptoms worsen [address] [place] [address]  [phone]

## 2020-05-05 NOTE — ED PROVIDER NOTES
EMERGENCY DEPARTMENT HISTORY AND PHYSICAL EXAM    Date: 5/5/2020  Patient Name: Renetta Cervantes    History of Presenting Illness     Chief Complaint   Patient presents with    Shortness of Breath         History Provided By: Patient    4:01 PM  Renetta Cervantes is a 66 y.o. male with PMHX of COPD on 2 L/min home oxygen, CHF, CKD who presents to the emergency department C/O shortness of breath. Patient states he was discharged from this hospital approximately 3 weeks ago. He reports since his discharge he has had progressively worsening shortness of breath and lower extremity edema. He states he is been using his home oxygen without relief. He denies fever, cough, sick contacts, chest pain, abdominal pain, other complaints. PCP: Funmilayo Nice MD    Current Outpatient Medications   Medication Sig Dispense Refill    albuterol-ipratropium (DUO-NEB) 2.5 mg-0.5 mg/3 ml nebu 3 mL by Nebulization route every four (4) hours as needed for Wheezing or Shortness of Breath. 30 Nebule 0    predniSONE (STERAPRED DS) 10 mg dose pack See administration instruction per 10mg dose pack 21 Tab 0    dilTIAZem (CARDIZEM) 30 mg tablet Take 1 Tab by mouth Before breakfast, lunch, and dinner. 90 Tab 0    chlorthalidone (HYGROTEN) 25 mg tablet Take 25 mg by mouth daily.  acetaminophen (TYLENOL) 325 mg tablet Take 500 mg by mouth every four (4) hours as needed for Pain.  OXYGEN-AIR DELIVERY SYSTEMS Take 2 L by inhalation continuous.  dextran 70/hypromellose (ARTIFICIAL TEARS, PF, OP) Apply 2 Drops to eye as needed for Other (both eyes for dryness).  diphenhydrAMINE (BENADRYL) 25 mg capsule Take 25 mg by mouth nightly as needed for Itching.  albuterol (PROVENTIL HFA, VENTOLIN HFA, PROAIR HFA) 90 mcg/actuation inhaler Take 2 Puffs by inhalation every four (4) hours as needed for Wheezing or Shortness of Breath.  1 Inhaler 1    ipratropium (ATROVENT) 0.03 % nasal spray 2 Sprays by Both Nostrils route every twelve (12) hours as needed for Rhinitis.  tamsulosin (FLOMAX) 0.4 mg capsule Take 0.4 mg by mouth every evening. Past History     Past Medical History:  Past Medical History:   Diagnosis Date    Brain aneurysm     [facility]     prostate    COPD [facility]ch[facility] obstructive [facility]     Hypertension     Nocturia     Pneumonia     Radiation effect        Past Surgical History:  Past Surgical History:   Procedure Laterality Date    HX HERNIA REPAIR         Family History:  Family History   Problem Relation Age of Onset    Heart Disease Mother        Social History:  Social History     Tobacco Use    Smoking status: Former Smoker     Types: Cigarettes    Smokeless tobacco: Never Used   Substance Use Topics    Alcohol use: No    Drug use: Never       Allergies: Allergies   Allergen Reactions    Aspirin Other (comments)     \"messes my stomach up\"         Review of Systems   Review of Systems   Constitutional: Negative for fever. Respiratory: Positive for shortness of breath. Negative for cough. Cardiovascular: Positive for leg swelling. Negative for chest pain. Gastrointestinal: Negative for abdominal pain. All other systems reviewed and are negative.         Physical Exam     Vitals:    05/05/20 1621 05/05/20 1816 05/05/20 1826 05/05/20 1900   BP:   139/60 123/71   Pulse:       Resp:       Temp:       SpO2: 100% 100%     Weight:       Height:         Physical Exam    Nursing notes and vital signs reviewed    Constitutional: Non toxic appearing, moderate distress  Head: Normocephalic, Atraumatic  Eyes: EOMI  Neck: Supple  Cardiovascular: Echocardiac and regular rate and rhythm, no murmurs, rubs, or gallops  Chest: Increased work of breathing and equal chest excursion bilaterally  Lungs: Coarse expiratory wheezes to ausculation bilaterally  Abdomen: Soft, non tender, non distended, normoactive bowel sounds  Back: No evidence of trauma or deformity  Extremities: No evidence of trauma or deformity, severe bilateral LE edema  Skin: Warm and dry, normal cap refill  Neuro: Alert and appropriate  Psychiatric: Normal mood and affect      Diagnostic Study Results     Labs -     Recent Results (from the past 12 hour(s))   EKG, 12 LEAD, INITIAL    Collection Time: 05/05/20  4:21 PM   Result Value Ref Range    Ventricular Rate 94 BPM    Atrial Rate 94 BPM    P-R Interval 164 ms    QRS Duration 82 ms    Q-T Interval 350 ms    QTC Calculation (Bezet) 437 ms    Calculated P Axis 59 degrees    Calculated R Axis 42 degrees    Calculated T Axis 43 degrees    Diagnosis       Normal sinus rhythm  Normal ECG  When compared with ECG of 09-APR-2020 19:41,  premature ventricular complexes are no longer present  ST no longer depressed in Lateral leads     CBC WITH AUTOMATED DIFF    Collection Time: 05/05/20  4:25 PM   Result Value Ref Range    WBC 6.1 4.6 - 13.2 K/uL    RBC 3.76 (L) 4.70 - 5.50 M/uL    HGB 10.7 (L) 13.0 - 16.0 g/dL    HCT 31.7 (L) 36.0 - 48.0 %    MCV 84.3 74.0 - 97.0 FL    MCH 28.5 24.0 - 34.0 PG    MCHC 33.8 31.0 - 37.0 g/dL    RDW 14.7 (H) 11.6 - 14.5 %    PLATELET 004 438 - 733 K/uL    MPV 8.8 (L) 9.2 - 11.8 FL    NEUTROPHILS 61 40 - 73 %    LYMPHOCYTES 16 (L) 21 - 52 %    MONOCYTES 11 (H) 3 - 10 %    EOSINOPHILS 11 (H) 0 - 5 %    BASOPHILS 1 0 - 2 %    ABS. NEUTROPHILS 3.7 1.8 - 8.0 K/UL    ABS. LYMPHOCYTES 1.0 0.9 - 3.6 K/UL    ABS. MONOCYTES 0.7 0.05 - 1.2 K/UL    ABS. EOSINOPHILS 0.7 (H) 0.0 - 0.4 K/UL    ABS.  BASOPHILS 0.0 0.0 - 0.1 K/UL    DF AUTOMATED     METABOLIC PANEL, COMPREHENSIVE    Collection Time: 05/05/20  4:25 PM   Result Value Ref Range    Sodium 134 (L) 136 - 145 mmol/L    Potassium 3.7 3.5 - 5.5 mmol/L    Chloride 101 100 - 111 mmol/L    CO2 27 21 - 32 mmol/L    Anion gap 6 3.0 - 18 mmol/L    Glucose 109 (H) 74 - 99 mg/dL    BUN 17 7.0 - 18 MG/DL    Creatinine 1.28 0.6 - 1.3 MG/DL    BUN/Creatinine ratio 13 12 - 20      GFR est AA >60 >60 ml/min/1.73m2    GFR est non-AA 55 (L) >60 ml/min/1.73m2    Calcium 8.2 (L) 8.5 - 10.1 MG/DL    Bilirubin, total 0.3 0.2 - 1.0 MG/DL    ALT (SGPT) 21 16 - 61 U/L    AST (SGOT) 13 10 - 38 U/L    Alk. phosphatase 117 45 - 117 U/L    Protein, total 5.9 (L) 6.4 - 8.2 g/dL    Albumin 2.9 (L) 3.4 - 5.0 g/dL    Globulin 3.0 2.0 - 4.0 g/dL    A-G Ratio 1.0 0.8 - 1.7     CARDIAC PANEL,(CK, CKMB & TROPONIN)    Collection Time: 05/05/20  4:25 PM   Result Value Ref Range    CK 87 39 - 308 U/L    CK - MB 4.0 (H) <3.6 ng/ml    CK-MB Index 4.6 (H) 0.0 - 4.0 %    Troponin-I, QT <0.02 0.0 - 0.045 NG/ML   NT-PRO BNP    Collection Time: 05/05/20  4:25 PM   Result Value Ref Range    NT pro-BNP 59 0 - 1,800 PG/ML   D DIMER    Collection Time: 05/05/20  4:25 PM   Result Value Ref Range    D DIMER 0.53 (H) <0.46 ug/ml(FEU)   POC G3    Collection Time: 05/05/20  4:41 PM   Result Value Ref Range    Device: NASAL CANNULA      Flow rate (POC) 2 L/M    FIO2 (POC) 28 %    pH (POC) 7.419 7.35 - 7.45      pCO2 (POC) 37.9 35.0 - 45.0 MMHG    pO2 (POC) 150 (H) 80 - 100 MMHG    HCO3 (POC) 24.5 22 - 26 MMOL/L    sO2 (POC) 99 (H) 92 - 97 %    Base excess (POC) 0 mmol/L    Allens test (POC) YES      Site RIGHT RADIAL      Specimen type (POC) ARTERIAL      Performed by Naresh Cloud        Radiologic Studies -   XR CHEST PORT   Final Result   IMPRESSION:      No acute cardiopulmonary abnormality. CT Results  (Last 48 hours)    None        CXR Results  (Last 48 hours)               05/05/20 1635  XR CHEST PORT Final result    Impression:  IMPRESSION:       No acute cardiopulmonary abnormality. Narrative:  EXAM: XR CHEST PORT       CLINICAL INDICATION/HISTORY: SOB   -Additional: None       COMPARISON: 4/9/2020       TECHNIQUE: Portable frontal view of the chest       _______________       FINDINGS:       SUPPORT DEVICES: None. HEART AND MEDIASTINUM: Cardiomediastinal silhouette within normal limits.        LUNGS AND PLEURAL SPACES: No dense consolidation, large effusion or   pneumothorax. Bibasilar atelectasis. Chronic pleural-based opacities   bilaterally. _______________                 Medications given in the ED-  Medications - No data to display      Medical Decision Making   I am the first provider for this patient. I reviewed the vital signs, available nursing notes, past medical history, past surgical history, family history and social history. Vital Signs-Reviewed the patient's vital signs. Pulse Oximetry Analysis -99 % on room air    EKG interpretation: (Preliminary)  EKG read by Dr. Thomas Beasley at 4:24 PM  Normal sinus rhythm at a rate of 94 bpm, AR interval 164 ms, QRS duration 82 ms    Records Reviewed: Nursing Notes, Old Medical Records and Previous electrocardiograms    Provider Notes (Medical Decision Making): Livan Stark is a 66 y.o. male presenting for shortness of breath and lower extremity edema. Patient is hemodynamically stable and not hypoxic even with ambulation. Wells low risk, PERC negative, age-adjusted d-dimer negative. Cardiac enzymes within normal limits. Chest x-ray without acute abnormality. Other labs benign. Due to prevalence of Covid-19 testing was sent. Plan for discharge with quarantine instructions, early primary care follow-up, refill of nebulizer solution as requested, and strict return precautions. Procedures:  Procedures    ED Course:   6:04 PM  Updated patient on all results and plan. All questions answered. Patient still feels dyspneic and and is tachypneic.    6:28 PM  Patient ambulated on his home oxygen without difficulty and no desaturation. Diagnosis and Disposition     Critical Care: None    DISCHARGE NOTE:    Abran Beavervamsi Vivian's  results have been reviewed with him. He has been counseled regarding his diagnosis, treatment, and plan.   He verbally conveys understanding and agreement of the signs, symptoms, diagnosis, treatment and prognosis and additionally agrees to follow up as discussed. He also agrees with the care-plan and conveys that all of his questions have been answered. I have also provided discharge instructions for him that include: educational information regarding their diagnosis and treatment, and list of reasons why they would want to return to the ED prior to their follow-up appointment, should his condition change. He has been provided with education for proper emergency department utilization. CLINICAL IMPRESSION:    1. SOB (shortness of breath)    2. Suspected Covid-19 Virus Infection        PLAN:  1. D/C Home  2. Current Discharge Medication List      CONTINUE these medications which have CHANGED    Details   albuterol-ipratropium (DUO-NEB) 2.5 mg-0.5 mg/3 ml nebu 3 mL by Nebulization route every four (4) hours as needed for Wheezing or Shortness of Breath. Qty: 30 Nebule, Refills: 0           3. Follow-up Information     Follow up With Specialties Details Why Contact Info    Lea Downey MD Family Practice Schedule an appointment as soon as possible for a visit  [address] [address]  [phone]      [facility] EMERGENCY DEPT Emergency Medicine  If symptoms worsen [address] [address]  [phone]        _______________________________      Please note that this dictation was completed with [employer], the computer voice recognition software. Quite often unanticipated grammatical, syntax, homophones, and other interpretive errors are inadvertently transcribed by the computer software. Please disregard these errors. Please excuse any errors that have escaped final proofreading.

## 2020-05-06 ENCOUNTER — PATIENT OUTREACH (OUTPATIENT)
Dept: FAMILY MEDICINE CLINIC | Facility: CLINIC | Age: 75
End: 2020-05-06

## 2020-05-06 LAB
ATRIAL RATE: 94 BPM
CALCULATED P AXIS, ECG09: 59 DEGREES
CALCULATED R AXIS, ECG10: 42 DEGREES
CALCULATED T AXIS, ECG11: 43 DEGREES
DIAGNOSIS, 93000: NORMAL
P-R INTERVAL, ECG05: 164 MS
Q-T INTERVAL, ECG07: 350 MS
QRS DURATION, ECG06: 82 MS
QTC CALCULATION (BEZET), ECG08: 437 MS
VENTRICULAR RATE, ECG03: 94 BPM

## 2020-05-06 RX ORDER — IPRATROPIUM BROMIDE AND ALBUTEROL SULFATE 2.5; .5 MG/3ML; MG/3ML
3 SOLUTION RESPIRATORY (INHALATION)
Qty: 30 NEBULE | Refills: 0 | Status: SHIPPED | OUTPATIENT
Start: 2020-05-06 | End: 2020-06-17

## 2020-05-06 NOTE — PROGRESS NOTES
Concerns for Covid 19 Transition    Patient Outreached Attempted. Number is invalid. Message left on [facility] voicemail requesting a call back.

## 2020-05-06 NOTE — ED NOTES
05/06/2020 @ 1517  Accessed pt chart due to pt calling about electronic rx, pt [facility] is stating they have no albuterol for him, MD Carina Mcmahon made aware and will resend

## 2020-05-07 ENCOUNTER — PATIENT OUTREACH (OUTPATIENT)
Dept: FAMILY MEDICINE CLINIC | Facility: CLINIC | Age: 75
End: 2020-05-07

## 2020-05-07 NOTE — PROGRESS NOTES
Concerns for Covid 19 Transition    2nd Patient Outreached Attempted. Received patient's voicemail box. Message left requesting call back. This episode is resolved.

## 2020-05-13 ENCOUNTER — APPOINTMENT (OUTPATIENT)
Dept: GENERAL RADIOLOGY | Age: 75
End: 2020-05-13
Attending: EMERGENCY MEDICINE
Payer: COMMERCIAL

## 2020-05-13 ENCOUNTER — HOSPITAL ENCOUNTER (EMERGENCY)
Age: 75
Discharge: HOME | End: 2020-05-13
Attending: EMERGENCY MEDICINE
Payer: COMMERCIAL

## 2020-05-13 VITALS
HEART RATE: 80 BPM | DIASTOLIC BLOOD PRESSURE: 82 MMHG | TEMPERATURE: 98.2 F | WEIGHT: 200 LBS | OXYGEN SATURATION: 100 % | BODY MASS INDEX: 30.31 KG/M2 | SYSTOLIC BLOOD PRESSURE: 98 MMHG | HEIGHT: 68 IN | RESPIRATION RATE: 25 BRPM

## 2020-05-13 DIAGNOSIS — Z99.81 SUPPLEMENTAL OXYGEN DEPENDENT: ICD-10-CM

## 2020-05-13 DIAGNOSIS — E87.8 HYPOCHLOREMIA: ICD-10-CM

## 2020-05-13 DIAGNOSIS — J44.1 COPD EXACERBATION (HCC): Primary | ICD-10-CM

## 2020-05-13 LAB
ALBUMIN SERPL-MCNC: 3.2 G/DL (ref 3.4–5)
ALBUMIN/GLOB SERPL: 1 {RATIO} (ref 0.8–1.7)
ALP SERPL-CCNC: 112 U/L (ref 45–117)
ALT SERPL-CCNC: 13 U/L (ref 16–61)
ANION GAP SERPL CALC-SCNC: 4 MMOL/L (ref 3–18)
ARTERIAL PATENCY WRIST A: YES
AST SERPL-CCNC: 15 U/L (ref 10–38)
BASE EXCESS BLD CALC-SCNC: 3 MMOL/L
BASOPHILS # BLD: 0 K/UL (ref 0–0.1)
BASOPHILS NFR BLD: 0 % (ref 0–2)
BDY SITE: ABNORMAL
BILIRUB DIRECT SERPL-MCNC: <0.1 MG/DL (ref 0–0.2)
BILIRUB SERPL-MCNC: 0.3 MG/DL (ref 0.2–1)
BNP SERPL-MCNC: 187 PG/ML (ref 0–1800)
BUN SERPL-MCNC: 34 MG/DL (ref 7–18)
BUN/CREAT SERPL: 22 (ref 12–20)
CALCIUM SERPL-MCNC: 8.6 MG/DL (ref 8.5–10.1)
CHLORIDE SERPL-SCNC: 95 MMOL/L (ref 100–111)
CK MB CFR SERPL CALC: 3.1 % (ref 0–4)
CK MB SERPL-MCNC: 5 NG/ML (ref 5–25)
CK SERPL-CCNC: 161 U/L (ref 39–308)
CO2 SERPL-SCNC: 31 MMOL/L (ref 21–32)
CREAT SERPL-MCNC: 1.54 MG/DL (ref 0.6–1.3)
D DIMER PPP FEU-MCNC: 0.63 UG/ML(FEU)
DIFFERENTIAL METHOD BLD: ABNORMAL
EOSINOPHIL # BLD: 0 K/UL (ref 0–0.4)
EOSINOPHIL NFR BLD: 0 % (ref 0–5)
ERYTHROCYTE [DISTWIDTH] IN BLOOD BY AUTOMATED COUNT: 14.1 % (ref 11.6–14.5)
GAS FLOW.O2 O2 DELIVERY SYS: ABNORMAL L/MIN
GAS FLOW.O2 SETTING OXYMISER: 2 L/M
GLOBULIN SER CALC-MCNC: 3.3 G/DL (ref 2–4)
GLUCOSE SERPL-MCNC: 148 MG/DL (ref 74–99)
HCO3 BLD-SCNC: 27.8 MMOL/L (ref 22–26)
HCT VFR BLD AUTO: 30.4 % (ref 36–48)
HGB BLD-MCNC: 10.4 G/DL (ref 13–16)
LYMPHOCYTES # BLD: 0.7 K/UL (ref 0.9–3.6)
LYMPHOCYTES NFR BLD: 4 % (ref 21–52)
MAGNESIUM SERPL-MCNC: 2 MG/DL (ref 1.6–2.6)
MCH RBC QN AUTO: 28.2 PG (ref 24–34)
MCHC RBC AUTO-ENTMCNC: 34.2 G/DL (ref 31–37)
MCV RBC AUTO: 82.4 FL (ref 74–97)
MONOCYTES # BLD: 0.6 K/UL (ref 0.05–1.2)
MONOCYTES NFR BLD: 4 % (ref 3–10)
NEUTS SEG # BLD: 14.7 K/UL (ref 1.8–8)
NEUTS SEG NFR BLD: 92 % (ref 40–73)
O2/TOTAL GAS SETTING VFR VENT: 28 %
PCO2 BLD: 44.9 MMHG (ref 35–45)
PH BLD: 7.4 [PH] (ref 7.35–7.45)
PLATELET # BLD AUTO: 434 K/UL (ref 135–420)
PMV BLD AUTO: 8.3 FL (ref 9.2–11.8)
PO2 BLD: 132 MMHG (ref 80–100)
POTASSIUM SERPL-SCNC: 4.2 MMOL/L (ref 3.5–5.5)
PROT SERPL-MCNC: 6.5 G/DL (ref 6.4–8.2)
RBC # BLD AUTO: 3.69 M/UL (ref 4.7–5.5)
SAO2 % BLD: 99 % (ref 92–97)
SERVICE CMNT-IMP: ABNORMAL
SODIUM SERPL-SCNC: 130 MMOL/L (ref 136–145)
SPECIMEN TYPE: ABNORMAL
TROPONIN I SERPL-MCNC: <0.02 NG/ML (ref 0–0.04)
WBC # BLD AUTO: 15.9 K/UL (ref 4.6–13.2)

## 2020-05-13 PROCEDURE — 74011250637 HC RX REV CODE- 250/637: Performed by: EMERGENCY MEDICINE

## 2020-05-13 PROCEDURE — 80076 HEPATIC FUNCTION PANEL: CPT

## 2020-05-13 PROCEDURE — 83735 ASSAY OF MAGNESIUM: CPT

## 2020-05-13 PROCEDURE — 80048 BASIC METABOLIC PNL TOTAL CA: CPT

## 2020-05-13 PROCEDURE — 82550 ASSAY OF CK (CPK): CPT

## 2020-05-13 PROCEDURE — 99285 EMERGENCY DEPT VISIT HI MDM: CPT

## 2020-05-13 PROCEDURE — 83880 ASSAY OF NATRIURETIC PEPTIDE: CPT

## 2020-05-13 PROCEDURE — 94640 AIRWAY INHALATION TREATMENT: CPT

## 2020-05-13 PROCEDURE — 82803 BLOOD GASES ANY COMBINATION: CPT

## 2020-05-13 PROCEDURE — 36600 WITHDRAWAL OF ARTERIAL BLOOD: CPT

## 2020-05-13 PROCEDURE — 85379 FIBRIN DEGRADATION QUANT: CPT

## 2020-05-13 PROCEDURE — 93005 ELECTROCARDIOGRAM TRACING: CPT

## 2020-05-13 PROCEDURE — 71045 X-RAY EXAM CHEST 1 VIEW: CPT

## 2020-05-13 PROCEDURE — 85025 COMPLETE CBC W/AUTO DIFF WBC: CPT

## 2020-05-13 RX ORDER — ALBUTEROL SULFATE 90 UG/1
1 AEROSOL, METERED RESPIRATORY (INHALATION)
Status: DISCONTINUED | OUTPATIENT
Start: 2020-05-13 | End: 2020-05-13 | Stop reason: HOSPADM

## 2020-05-13 RX ORDER — ALBUTEROL SULFATE 1.25 MG/3ML
1.25 SOLUTION RESPIRATORY (INHALATION)
Qty: 25 EACH | Refills: 0 | Status: ON HOLD | OUTPATIENT
Start: 2020-05-13 | End: 2020-08-07

## 2020-05-13 RX ADMIN — ALBUTEROL SULFATE 1 PUFF: 90 AEROSOL, METERED RESPIRATORY (INHALATION) at 17:30

## 2020-05-13 NOTE — DISCHARGE INSTRUCTIONS
You were seen and evaluated in the Emergency Department. Please understand that your work up is not all encompassing and you should follow up with your primary care physician for further management and continuity of care. Please return to Emergency Department or seek medical attention immediately if you have acute worsening in your symptoms or develop chest pain, shortness of breath, repeated vomiting, fever, altered level of consciousness, coughing up blood, or start sweating and feel clammy. If you were prescribed any medicine for home, please take as prescribed by your health-care provider. If you were given any follow-up appointments or numbers to call, please do so as instructed. Avoid any tobacco products or excessive alcohol. Patient Education        COPD Exacerbation Plan: Care Instructions  Your Care Instructions    If you have chronic obstructive pulmonary disease (COPD), your usual shortness of breath could suddenly get worse. You may start coughing more and have more mucus. This flare-up is called a COPD exacerbation (say \"[id number]-[id number]\"). A lung infection or air pollution could set off an exacerbation. Sometimes it can happen after a quick change in temperature or being around chemicals. Work with your doctor to make a plan for dealing with an exacerbation. You can better manage it if you plan ahead. Follow-up care is a key part of your treatment and safety. Be sure to make and go to all appointments, and call your doctor if you are having problems. It's also a good idea to know your test results and keep a list of the medicines you take. How can you care for yourself at home? During an exacerbation  · Do not panic if you start to have one. Quick treatment at home may help you prevent serious breathing problems. If you have a COPD exacerbation plan that you developed with your doctor, follow it. · Take your medicines exactly as your doctor tells you.  ?  Use your inhaler as directed by your doctor. If your symptoms do not get better after you use your medicine, have someone take you to the emergency room. Call an ambulance if necessary. ? With inhaled medicines, a spacer or a nebulizer may help you get more medicine to your lungs. Ask your doctor or pharmacist how to use them properly. Practice using the spacer in front of a mirror before you have an exacerbation. This may help you get the medicine into your lungs quickly. ? If your doctor has given you steroid pills, take them as directed. ? Your doctor may have given you a prescription for antibiotics, which you can fill if you need it. ? Talk to your doctor if you have any problems with your medicine. And call your doctor if you have to use your antibiotic or steroid pills. Preventing an exacerbation  · Do not smoke. This is the most important step you can take to prevent more damage to your lungs and prevent problems. If you already smoke, it is never too late to stop. If you need help quitting, talk to your doctor about stop-smoking programs and medicines. These can increase your chances of quitting for good. · Take your daily medicines as prescribed. · Avoid colds and flu. ? Get a pneumococcal vaccine. ? Get a flu vaccine each year, as soon as it is available. Ask those you live or work with to do the same, so they will not get the flu and infect you. ? Try to stay away from people with colds or the flu. ? Wash your hands often. · Avoid secondhand smoke; air pollution; cold, dry air; hot, humid air; and high altitudes. Stay at home with your windows closed when air pollution is bad. · Learn breathing techniques for COPD, such as breathing through pursed lips. These techniques can help you breathe easier during an exacerbation. When should you call for help? Call 911 anytime you think you may need emergency care.  For example, call if:    · You have severe trouble breathing.     · You have severe chest pain.    Call your doctor now or seek immediate medical care if:    · You have new or worse shortness of breath.     · You develop new chest pain.     · You are coughing more deeply or more often, especially if you notice more mucus or a change in the color of your mucus.     · You cough up blood.     · You have new or increased swelling in your legs or belly.     · You have a fever.    Watch closely for changes in your health, and be sure to contact your doctor if:    · You need to use your antibiotic or steroid pills.     · Your symptoms are getting worse. Where can you learn more? Go to http://ifeoma-jana.info/  Enter U536 in the search box to learn more about \"COPD Exacerbation Plan: Care Instructions. \"  Current as of: June 9, 2019Content Version: 12.4  © 1267-5274 [employer]. Care instructions adapted under license by [employer] (which disclaims liability or warranty for this information). If you have questions about a medical condition or this instruction, always ask your healthcare professional. [address] any warranty or liability for your use of this information.

## 2020-05-13 NOTE — ED TRIAGE NOTES
Patient brought to ED bed 1 via EMS with c/o SOB x 2 weeks. Patient has been seen at [facility] and was seen at [place] yesterday and swabbed for COVID-19. Patient is using accessory muscles to breathe in triage. Patient placed on 2 L NC. Patient has hx COPD and rescue inhaler is offering no relief.

## 2020-05-13 NOTE — ED PROVIDER NOTES
EMERGENCY DEPARTMENT HISTORY AND PHYSICAL EXAM    Date: 5/13/2020  Patient Name: Randi Rivas    History of Presenting Illness     Chief Complaint   Patient presents with    Shortness of Breath         History Provided By: Patient    Additional History (Context):   Randi Rivas is a 66 y.o. male with PMHX CHF, COPD on 2 L of oxygen presents to the emergency department via EMS for shortness of breath. Pt denies chest pain, abdominal pain, nausea, vomiting, fever, and any other sxs or complaints. Patient states that he has been seen yesterday in 2 days prior for shortness of breath. Took his dose of albuterol and prednisone this morning with minimal results. Patient states that he was swabbed at [facility] yesterday for [place] however still pending results. PCP: Briana Chavez MD    Current Outpatient Medications   Medication Sig Dispense Refill    albuterol sulfate (PROAIR RESPICLICK) 90 mcg/actuation breath activated inhaler Take 1 Puff by inhalation every four (4) hours as needed for Wheezing. 1 Inhaler 0    albuterol (ACCUNEB) 1.25 mg/3 mL nebu Take 3 mL by inhalation every four (4) hours as needed for Wheezing (wheezing). 25 Each 0    albuterol-ipratropium (DUO-NEB) 2.5 mg-0.5 mg/3 ml nebu 3 mL by Nebulization route every four (4) hours as needed for Wheezing or Shortness of Breath. 30 Nebule 0    predniSONE (STERAPRED DS) 10 mg dose pack See administration instruction per 10mg dose pack 21 Tab 0    dilTIAZem (CARDIZEM) 30 mg tablet Take 1 Tab by mouth Before breakfast, lunch, and dinner. 90 Tab 0    chlorthalidone (HYGROTEN) 25 mg tablet Take 25 mg by mouth daily.  acetaminophen (TYLENOL) 325 mg tablet Take 500 mg by mouth every four (4) hours as needed for Pain.  OXYGEN-AIR DELIVERY SYSTEMS Take 2 L by inhalation continuous.  dextran 70/hypromellose (ARTIFICIAL TEARS, PF, OP) Apply 2 Drops to eye as needed for Other (both eyes for dryness).       diphenhydrAMINE (BENADRYL) 25 mg capsule Take 25 mg by mouth nightly as needed for Itching.  albuterol (PROVENTIL HFA, VENTOLIN HFA, PROAIR HFA) 90 mcg/actuation inhaler Take 2 Puffs by inhalation every four (4) hours as needed for Wheezing or Shortness of Breath. 1 Inhaler 1    ipratropium (ATROVENT) 0.03 % nasal spray 2 Sprays by Both Nostrils route every twelve (12) hours as needed for Rhinitis.  tamsulosin (FLOMAX) 0.4 mg capsule Take 0.4 mg by mouth every evening. Past History     Past Medical History:  Past Medical History:   Diagnosis Date    Brain aneurysm     Cancer [facility] [address]     prostate    COPD (chronic obstructive pulmonary disease) ([facility] [address]     Hypertension     Nocturia     Pneumonia     Radiation effect        Past Surgical History:  Past Surgical History:   Procedure Laterality Date    HX HERNIA REPAIR         Family History:  Family History   Problem Relation Age of Onset    Heart Disease Mother        Social History:  Social History     Tobacco Use    Smoking status: Former Smoker     Types: Cigarettes    Smokeless tobacco: Never Used   Substance Use Topics    Alcohol use: No    Drug use: Never       Allergies: Allergies   Allergen Reactions    Aspirin Other (comments)     \"messes my stomach up\"         Review of Systems   Review of Systems   Constitutional: Negative for chills and fever. HENT: Negative for congestion, ear pain, sinus pain and sore throat. Eyes: Negative for pain and visual disturbance. Respiratory: Positive for shortness of breath. Negative for cough. Cardiovascular: Negative for chest pain and leg swelling. Gastrointestinal: Negative for abdominal pain, constipation, diarrhea, nausea and vomiting. Genitourinary: Negative for dysuria and hematuria. Musculoskeletal: Negative for back pain and neck pain. Skin: Negative for pallor and rash. Neurological: Negative for dizziness, tremors, weakness, light-headedness and headaches.    All other systems reviewed and are negative. Physical Exam     Vitals:    05/13/20 1532 05/13/20 1538   BP: 155/78    Pulse: 89    Resp: 28    Temp: 98.2 °F (36.8 °C)    SpO2: 96% 96%   Weight: 90.7 kg (200 lb)    Height: 5' 8\" (1.727 m)      Physical Exam    Nursing note and vitals reviewed    Constitutional: Elderly  male, mildly tachypneic  Head: Normocephalic, Atraumatic  Eyes: Pupils are equal, round, and reactive to light, EOMI  Neck: Supple, non-tender  Cardiovascular: Regular rate and rhythm, no murmurs, rubs, or gallops  Chest: Normal work of breathing and chest excursion bilaterally  Lungs: Diminished breath sounds with mild scattered wheezes  Abdomen: Soft, non tender, non distended, normoactive bowel sounds  Back: No evidence of trauma or deformity  Extremities: No evidence of trauma or deformity, no LE edema.  No streaking erythema, vesicular lesions, ulcerations or bulla  Skin: Warm and dry, normal cap refill  Neuro: Alert and appropriate, CN intact, normal speech, moving all 4 extremities freely and symmetrically  Psychiatric: Normal mood and affect       Diagnostic Study Results     Labs -     Recent Results (from the past 12 hour(s))   EKG, 12 LEAD, INITIAL    Collection Time: 05/13/20  3:41 PM   Result Value Ref Range    Ventricular Rate 85 BPM    Atrial Rate 85 BPM    P-R Interval 164 ms    QRS Duration 82 ms    Q-T Interval 376 ms    QTC Calculation (Bezet) 447 ms    Calculated P Axis 65 degrees    Calculated R Axis 31 degrees    Calculated T Axis 54 degrees    Diagnosis       Normal sinus rhythm  Low voltage QRS  Borderline ECG  When compared with ECG of 05-MAY-2020 16:21,  No significant change was found     CBC WITH AUTOMATED DIFF    Collection Time: 05/13/20  3:45 PM   Result Value Ref Range    WBC 15.9 (H) 4.6 - 13.2 K/uL    RBC 3.69 (L) 4.70 - 5.50 M/uL    HGB 10.4 (L) 13.0 - 16.0 g/dL    HCT 30.4 (L) 36.0 - 48.0 %    MCV 82.4 74.0 - 97.0 FL    MCH 28.2 24.0 - 34.0 PG    MCHC 34.2 31.0 - 37.0 g/dL    RDW 14.1 11.6 - 14.5 %    PLATELET 592 (H) 705 - 420 K/uL    MPV 8.3 (L) 9.2 - 11.8 FL    NEUTROPHILS 92 (H) 40 - 73 %    LYMPHOCYTES 4 (L) 21 - 52 %    MONOCYTES 4 3 - 10 %    EOSINOPHILS 0 0 - 5 %    BASOPHILS 0 0 - 2 %    ABS. NEUTROPHILS 14.7 (H) 1.8 - 8.0 K/UL    ABS. LYMPHOCYTES 0.7 (L) 0.9 - 3.6 K/UL    ABS. MONOCYTES 0.6 0.05 - 1.2 K/UL    ABS. EOSINOPHILS 0.0 0.0 - 0.4 K/UL    ABS. BASOPHILS 0.0 0.0 - 0.1 K/UL    DF AUTOMATED     METABOLIC PANEL, BASIC    Collection Time: 05/13/20  3:45 PM   Result Value Ref Range    Sodium 130 (L) 136 - 145 mmol/L    Potassium 4.2 3.5 - 5.5 mmol/L    Chloride 95 (L) 100 - 111 mmol/L    CO2 31 21 - 32 mmol/L    Anion gap 4 3.0 - 18 mmol/L    Glucose 148 (H) 74 - 99 mg/dL    BUN 34 (H) 7.0 - 18 MG/DL    Creatinine 1.54 (H) 0.6 - 1.3 MG/DL    BUN/Creatinine ratio 22 (H) 12 - 20      GFR est AA 54 (L) >60 ml/min/1.73m2    GFR est non-AA 44 (L) >60 ml/min/1.73m2    Calcium 8.6 8.5 - 10.1 MG/DL   MAGNESIUM    Collection Time: 05/13/20  3:45 PM   Result Value Ref Range    Magnesium 2.0 1.6 - 2.6 mg/dL   NT-PRO BNP    Collection Time: 05/13/20  3:45 PM   Result Value Ref Range    NT pro- 0 - 1,800 PG/ML   CARDIAC PANEL,(CK, CKMB & TROPONIN)    Collection Time: 05/13/20  3:45 PM   Result Value Ref Range    CK - MB 5.0 (H) <3.6 ng/ml    CK-MB Index 3.1 0.0 - 4.0 %     39 - 308 U/L    Troponin-I, QT <0.02 0.0 - 0.045 NG/ML   HEPATIC FUNCTION PANEL    Collection Time: 05/13/20  3:45 PM   Result Value Ref Range    Protein, total 6.5 6.4 - 8.2 g/dL    Albumin 3.2 (L) 3.4 - 5.0 g/dL    Globulin 3.3 2.0 - 4.0 g/dL    A-G Ratio 1.0 0.8 - 1.7      Bilirubin, total 0.3 0.2 - 1.0 MG/DL    Bilirubin, direct <0.1 0.0 - 0.2 MG/DL    Alk.  phosphatase 112 45 - 117 U/L    AST (SGOT) 15 10 - 38 U/L    ALT (SGPT) 13 (L) 16 - 61 U/L   D DIMER    Collection Time: 05/13/20  3:45 PM   Result Value Ref Range    D DIMER 0.63 (H) <0.46 ug/ml(FEU)   POC G3    Collection Time: 05/13/20  4:19 PM   Result Value Ref Range    Device: NASAL CANNULA      Flow rate (POC) 2 L/M    FIO2 (POC) 28 %    pH (POC) 7.399 7.35 - 7.45      pCO2 (POC) 44.9 35.0 - 45.0 MMHG    pO2 (POC) 132 (H) 80 - 100 MMHG    HCO3 (POC) 27.8 (H) 22 - 26 MMOL/L    sO2 (POC) 99 (H) 92 - 97 %    Base excess (POC) 3 mmol/L    Allens test (POC) YES      Site RIGHT RADIAL      Specimen type (POC) ARTERIAL      Performed by Laila Campos        Radiologic Studies -   XR CHEST PORT   Final Result   IMPRESSION:      No acute cardiopulmonary abnormality. CT Results  (Last 48 hours)    None        CXR Results  (Last 48 hours)               05/13/20 1554  XR CHEST PORT Final result    Impression:  IMPRESSION:       No acute cardiopulmonary abnormality. Narrative:  EXAM: XR CHEST PORT       CLINICAL INDICATION/HISTORY: SOB   -Additional: None       COMPARISON: 5/5/2020       TECHNIQUE: Portable frontal view of the chest       _______________       FINDINGS:       SUPPORT DEVICES: None. HEART AND MEDIASTINUM: Cardiomediastinal silhouette within normal limits. LUNGS AND PLEURAL SPACES: Chronic right pleural plaques. Bibasilar atelectasis. No dense consolidation, large effusion or pneumothorax.       _______________                   Medical Decision Making   I am the first provider for this patient. I reviewed the vital signs, available nursing notes, past medical history, past surgical history, family history and social history. Vital Signs-Reviewed the patient's vital signs. Pulse Oximetry Analysis -96 % on 2L    Cardiac Monitor:  Rate: 89 bpm  Rhythm: Regular    EKG interpretation: (Preliminary)  3:56 PM   Normal sinus rhythm at 85 beats minute. QTc 447 ms. No acute ST elevation    Records Reviewed: Nursing Notes and Old Medical Records    Provider Notes:   66 y.o. male presenting with shortness of breath. History of COPD. On exam patient able to speak in full sentences saturating 96% on 2 L of his home oxygen. Patient is afebrile, normotensive and not tachycardic. He does not appear acutely ill or toxic. Patient seen yesterday and then 2 days prior for COPD exacerbation. Was started on steroids. Patient also has been seen in this emergency department approximately 1 week ago for similar symptoms. Patient does have diminished breath sounds with mild scattered wheezes however does not appear in respiratory distress. Very low suspicion for PE, will evaluate with a d-dimer. Also eval for ACS although very low suspicion this patient denying any chest pain. Procedures:  Procedures    ED Course:   3:45 PM Initial assessment performed. The patients presenting problems have been discussed, and they are in agreement with the care plan formulated and outlined with them. I have encouraged them to ask questions as they arise throughout their visit. 4:44 PM  Patient's lab work showing leukocytosis of 15.9. However patient on steroid taper due to his COPD exacerbation which was started 3 days ago. Patient afebrile with no infectious today. Mild hypochloremia. Troponin is negative. D-dimer is negative when age-adjusted. Chest x-ray showing no acute process. Patient urged to continue his albuterol and steroid taper as needed. No indication to repeat COVID testing as this was just done yesterday. Patient's ABG showing good oxygen saturation, no indication for admission at this time. On reassessment, patient comfortable on his 2 L of oxygen. Reports that he attempted to  his prescriptions yesterday and today however was told by the pharmacist that he was \"using his albuterol too fast\". Will attempt to resend a new prescription for pro-air and albuterol nebulizers. Patient urged to  his prescriptions immediately for his shortness of breath/chronic COPD at home. Diagnosis and Disposition       DISCHARGE NOTE:  4:44 PM    Raj Park's  results have been reviewed with him.   He has been counseled regarding his diagnosis, treatment, and plan. He verbally conveys understanding and agreement of the signs, symptoms, diagnosis, treatment and prognosis and additionally agrees to follow up as discussed. He also agrees with the care-plan and conveys that all of his questions have been answered. I have also provided discharge instructions for him that include: educational information regarding their diagnosis and treatment, and list of reasons why they would want to return to the ED prior to their follow-up appointment, should his condition change. He has been provided with education for proper emergency department utilization. CLINICAL IMPRESSION:    1. COPD exacerbation ([address]    2. Hypochloremia    3. Supplemental oxygen dependent        PLAN:  1. D/C Home  2. Current Discharge Medication List      START taking these medications    Details   albuterol sulfate (PROAIR RESPICLICK) 90 mcg/actuation breath activated inhaler Take 1 Puff by inhalation every four (4) hours as needed for Wheezing. Qty: 1 Inhaler, Refills: 0      albuterol (ACCUNEB) 1.25 mg/3 mL nebu Take 3 mL by inhalation every four (4) hours as needed for Wheezing (wheezing). Qty: 25 Each, Refills: 0         CONTINUE these medications which have NOT CHANGED    Details   albuterol (PROVENTIL HFA, VENTOLIN HFA, PROAIR HFA) 90 mcg/actuation inhaler Take 2 Puffs by inhalation every four (4) hours as needed for Wheezing or Shortness of Breath. Qty: 1 Inhaler, Refills: 1           3.    Follow-up Information     Follow up With Specialties Details Why Contact Info    Joey Downey MD Family Practice Schedule an appointment as soon as possible for a visit in 2 days  [address] [facility] [address] [facility] [address]  [phone]      [facility] EMERGENCY DEPT Emergency Medicine  As needed if symptoms worsen [address] [address]  [phone]        ____________________________________     Please note that this dictation was completed with Dragon, the computer voice recognition software. Quite often unanticipated grammatical, syntax, homophones, and other interpretive errors are inadvertently transcribed by the computer software. Please disregard these errors. Please excuse any errors that have escaped final proofreading.

## 2020-05-13 NOTE — ED NOTES
Reviewed discharge instructions and medications with patient. Patient verbalized understanding of instructions. All questions answered  Armband removed and shredded.  Patient wheeled to exit by RN

## 2020-05-14 ENCOUNTER — PATIENT OUTREACH (OUTPATIENT)
Dept: CASE MANAGEMENT | Age: 75
End: 2020-05-14

## 2020-05-14 LAB
ATRIAL RATE: 85 BPM
CALCULATED P AXIS, ECG09: 65 DEGREES
CALCULATED R AXIS, ECG10: 31 DEGREES
CALCULATED T AXIS, ECG11: 54 DEGREES
DIAGNOSIS, 93000: NORMAL
P-R INTERVAL, ECG05: 164 MS
Q-T INTERVAL, ECG07: 376 MS
QRS DURATION, ECG06: 82 MS
QTC CALCULATION (BEZET), ECG08: 447 MS
VENTRICULAR RATE, ECG03: 85 BPM

## 2020-05-14 NOTE — PROGRESS NOTES
Patient contacted regarding COVID-19  risk. Care Transition Nurse/ Ambulatory Care Manager contacted the patient's wife, Mrs. Wale Giraldo (listed on ACP) by telephone to perform post discharge assessment. Verified name and  with Mrs. Park as identifiers. Provided introduction to self, and explanation of the CTN/ACM role, and reason for call due to risk factors for infection and/or exposure to COVID-19. Symptoms reviewed with Mrs. Park who verbalized the following symptoms: cough. Due to no new or worsening symptoms encounter was not routed to provider for escalation. Patient has following risk factors of: COPD and oxygen dependant. CTN/ACM reviewed discharge instructions, medical action plan and red flags such as increased shortness of breath, increasing fever and signs of decompensation with Mrs. Park who verbalized understanding. Discussed exposure protocols and quarantine with CDC Guidelines What to do if you are sick with coronavirus disease 2019.  Mrs. Wale Giraldo was given an opportunity for questions and concerns. Mrs. Wale Giraldo agrees to contact the [facility] exposure line [phone], [facility] [address]  ([phone]) and PCP office for questions related to their healthcare. CTN/ACM provided contact information for future needs. Reviewed and educated Mrs. Park on any new and changed medications related to discharge diagnosis. Patient/family/caregiver given information for [employer] and agrees to enroll no. Mrs. Wale Giraldo states they do not have a computer. Patient's preferred e-mail:  NA  Patient's preferred phone number: NA  Based on Loop alert triggers, patient will be contacted by nurse care manager for worsening symptoms. Plan for follow-up call in 3-5 days based on severity of symptoms and risk factors. Reviewed self-quarantine instructions with Mrs. Park. Explained to Mrs. Park self-quarantine is isolating self to one room and using one bathroom, if possible and to avoid contact with family and others for a period of 14 days to avoid spreading illness.

## 2020-05-21 ENCOUNTER — PATIENT OUTREACH (OUTPATIENT)
Dept: CASE MANAGEMENT | Age: 75
End: 2020-05-21

## 2020-05-21 NOTE — PROGRESS NOTES
Date/Time:  5/21/2020 3:34 PM  Attempted to reach family by telephone. Left HIPPA compliant message requesting a return call. Will attempt to reach patient again.

## 2020-05-28 ENCOUNTER — NURSE NAVIGATOR (OUTPATIENT)
Dept: OTHER | Age: 75
End: 2020-05-28

## 2020-05-28 ENCOUNTER — PATIENT OUTREACH (OUTPATIENT)
Dept: CASE MANAGEMENT | Age: 75
End: 2020-05-28

## 2020-05-28 NOTE — PROGRESS NOTES
Date/Time:  5/28/2020 3:32 PM  Attempted to reach Mrs. Park (listed on ACP) by telephone. Left HIPPA compliant message. Patient resolved from Transition of Care episode on 5/28/20  Discussed COVID-19 related testing which was not done at this time. Test results were not done. Patient informed of results, if available? NA     Patient/family has previously been provided the following resources and education related to COVID-19:                         Signs, symptoms and red flags related to COVID-19            CDC exposure and quarantine guidelines            [facility] exposure contact - [phone]            Contact for their local Department of Health                 No further outreach scheduled with this CTN/ACM/LPN/HC/ MA. Episode of Care resolved. Patient has this CTN/ACM/LPN/HC/MA contact information if future needs arise.

## 2020-05-29 ENCOUNTER — HOSPITAL ENCOUNTER (OUTPATIENT)
Dept: CT IMAGING | Age: 75
Discharge: HOME | End: 2020-05-29
Attending: INTERNAL MEDICINE
Payer: COMMERCIAL

## 2020-05-29 DIAGNOSIS — F17.211 CIGARETTE NICOTINE DEPENDENCE IN REMISSION: ICD-10-CM

## 2020-05-29 PROCEDURE — G0297 LDCT FOR LUNG CA SCREEN: HCPCS

## 2020-06-13 ENCOUNTER — APPOINTMENT (OUTPATIENT)
Dept: GENERAL RADIOLOGY | Age: 75
DRG: 191 | End: 2020-06-13
Attending: EMERGENCY MEDICINE
Payer: MEDICARE

## 2020-06-13 ENCOUNTER — HOSPITAL ENCOUNTER (INPATIENT)
Age: 75
LOS: 4 days | Discharge: HOME | DRG: 191 | End: 2020-06-17
Attending: EMERGENCY MEDICINE | Admitting: INTERNAL MEDICINE
Payer: MEDICARE

## 2020-06-13 ENCOUNTER — APPOINTMENT (OUTPATIENT)
Dept: VASCULAR SURGERY | Age: 75
DRG: 191 | End: 2020-06-13
Attending: EMERGENCY MEDICINE
Payer: MEDICARE

## 2020-06-13 DIAGNOSIS — J44.1 ACUTE EXACERBATION OF CHRONIC OBSTRUCTIVE PULMONARY DISEASE (COPD) (HCC): ICD-10-CM

## 2020-06-13 DIAGNOSIS — R06.02 SOB (SHORTNESS OF BREATH): Primary | ICD-10-CM

## 2020-06-13 DIAGNOSIS — E87.1 HYPONATREMIA: ICD-10-CM

## 2020-06-13 LAB
ALBUMIN SERPL-MCNC: 3.2 G/DL (ref 3.4–5)
ALBUMIN/GLOB SERPL: 0.9 {RATIO} (ref 0.8–1.7)
ALP SERPL-CCNC: 107 U/L (ref 45–117)
ALT SERPL-CCNC: 11 U/L (ref 16–61)
ANION GAP SERPL CALC-SCNC: 9 MMOL/L (ref 3–18)
ARTERIAL PATENCY WRIST A: YES
AST SERPL-CCNC: 11 U/L (ref 10–38)
ATRIAL RATE: 91 BPM
BASE EXCESS BLD CALC-SCNC: 0 MMOL/L
BASOPHILS # BLD: 0.1 K/UL (ref 0–0.1)
BASOPHILS NFR BLD: 0 % (ref 0–2)
BDY SITE: ABNORMAL
BILIRUB SERPL-MCNC: 0.6 MG/DL (ref 0.2–1)
BNP SERPL-MCNC: 129 PG/ML (ref 0–1800)
BUN SERPL-MCNC: 12 MG/DL (ref 7–18)
BUN/CREAT SERPL: 11 (ref 12–20)
CALCIUM SERPL-MCNC: 8.7 MG/DL (ref 8.5–10.1)
CALCULATED P AXIS, ECG09: 64 DEGREES
CALCULATED R AXIS, ECG10: 17 DEGREES
CALCULATED T AXIS, ECG11: 35 DEGREES
CHLORIDE SERPL-SCNC: 93 MMOL/L (ref 100–111)
CK MB CFR SERPL CALC: 3.2 % (ref 0–4)
CK MB SERPL-MCNC: 5.8 NG/ML (ref 5–25)
CK SERPL-CCNC: 181 U/L (ref 39–308)
CO2 SERPL-SCNC: 25 MMOL/L (ref 21–32)
CREAT SERPL-MCNC: 1.09 MG/DL (ref 0.6–1.3)
DIAGNOSIS, 93000: NORMAL
DIFFERENTIAL METHOD BLD: ABNORMAL
EOSINOPHIL # BLD: 0.4 K/UL (ref 0–0.4)
EOSINOPHIL NFR BLD: 3 % (ref 0–5)
ERYTHROCYTE [DISTWIDTH] IN BLOOD BY AUTOMATED COUNT: 13.8 % (ref 11.6–14.5)
GAS FLOW.O2 O2 DELIVERY SYS: ABNORMAL L/MIN
GAS FLOW.O2 SETTING OXYMISER: 2 L/M
GLOBULIN SER CALC-MCNC: 3.5 G/DL (ref 2–4)
GLUCOSE SERPL-MCNC: 104 MG/DL (ref 74–99)
HCO3 BLD-SCNC: 24.1 MMOL/L (ref 22–26)
HCT VFR BLD AUTO: 32.4 % (ref 36–48)
HGB BLD-MCNC: 10.9 G/DL (ref 13–16)
L PNEUMO AG UR QL IA: NEGATIVE
LACTATE SERPL-SCNC: 1.2 MMOL/L (ref 0.4–2)
LYMPHOCYTES # BLD: 1.1 K/UL (ref 0.9–3.6)
LYMPHOCYTES NFR BLD: 9 % (ref 21–52)
MCH RBC QN AUTO: 28.8 PG (ref 24–34)
MCHC RBC AUTO-ENTMCNC: 33.6 G/DL (ref 31–37)
MCV RBC AUTO: 85.5 FL (ref 74–97)
MONOCYTES # BLD: 1 K/UL (ref 0.05–1.2)
MONOCYTES NFR BLD: 8 % (ref 3–10)
NEUTS SEG # BLD: 9.4 K/UL (ref 1.8–8)
NEUTS SEG NFR BLD: 80 % (ref 40–73)
O2/TOTAL GAS SETTING VFR VENT: 28 %
P-R INTERVAL, ECG05: 164 MS
PCO2 BLD: 34.9 MMHG (ref 35–45)
PH BLD: 7.45 [PH] (ref 7.35–7.45)
PLATELET # BLD AUTO: 365 K/UL (ref 135–420)
PMV BLD AUTO: 8.5 FL (ref 9.2–11.8)
PO2 BLD: 105 MMHG (ref 80–100)
POTASSIUM SERPL-SCNC: 3.8 MMOL/L (ref 3.5–5.5)
PROT SERPL-MCNC: 6.7 G/DL (ref 6.4–8.2)
Q-T INTERVAL, ECG07: 368 MS
QRS DURATION, ECG06: 78 MS
QTC CALCULATION (BEZET), ECG08: 452 MS
RBC # BLD AUTO: 3.79 M/UL (ref 4.7–5.5)
S PNEUM AG UR QL: NEGATIVE
SAO2 % BLD: 98 % (ref 92–97)
SERVICE CMNT-IMP: ABNORMAL
SODIUM SERPL-SCNC: 127 MMOL/L (ref 136–145)
SPECIMEN TYPE: ABNORMAL
TROPONIN I SERPL-MCNC: <0.02 NG/ML (ref 0–0.04)
VENTRICULAR RATE, ECG03: 91 BPM
WBC # BLD AUTO: 11.9 K/UL (ref 4.6–13.2)

## 2020-06-13 PROCEDURE — 77030013140 HC MSK NEB VYRM -A

## 2020-06-13 PROCEDURE — 94640 AIRWAY INHALATION TREATMENT: CPT

## 2020-06-13 PROCEDURE — 87077 CULTURE AEROBIC IDENTIFY: CPT

## 2020-06-13 PROCEDURE — 83880 ASSAY OF NATRIURETIC PEPTIDE: CPT

## 2020-06-13 PROCEDURE — 36600 WITHDRAWAL OF ARTERIAL BLOOD: CPT

## 2020-06-13 PROCEDURE — 92526 ORAL FUNCTION THERAPY: CPT

## 2020-06-13 PROCEDURE — 96365 THER/PROPH/DIAG IV INF INIT: CPT

## 2020-06-13 PROCEDURE — 82550 ASSAY OF CK (CPK): CPT

## 2020-06-13 PROCEDURE — 94762 N-INVAS EAR/PLS OXIMTRY CONT: CPT

## 2020-06-13 PROCEDURE — 85025 COMPLETE CBC W/AUTO DIFF WBC: CPT

## 2020-06-13 PROCEDURE — 65270000029 HC RM PRIVATE

## 2020-06-13 PROCEDURE — 99285 EMERGENCY DEPT VISIT HI MDM: CPT

## 2020-06-13 PROCEDURE — 74011000250 HC RX REV CODE- 250: Performed by: INTERNAL MEDICINE

## 2020-06-13 PROCEDURE — 87070 CULTURE OTHR SPECIMN AEROBIC: CPT

## 2020-06-13 PROCEDURE — 74011250637 HC RX REV CODE- 250/637: Performed by: INTERNAL MEDICINE

## 2020-06-13 PROCEDURE — 93005 ELECTROCARDIOGRAM TRACING: CPT

## 2020-06-13 PROCEDURE — 87449 NOS EACH ORGANISM AG IA: CPT

## 2020-06-13 PROCEDURE — 82803 BLOOD GASES ANY COMBINATION: CPT

## 2020-06-13 PROCEDURE — 87450 LEGIONELLA PNEUMOPHILA AG, URINE: CPT

## 2020-06-13 PROCEDURE — 92610 EVALUATE SWALLOWING FUNCTION: CPT

## 2020-06-13 PROCEDURE — 93970 EXTREMITY STUDY: CPT

## 2020-06-13 PROCEDURE — 83605 ASSAY OF LACTIC ACID: CPT

## 2020-06-13 PROCEDURE — 74011000250 HC RX REV CODE- 250: Performed by: EMERGENCY MEDICINE

## 2020-06-13 PROCEDURE — 87186 SC STD MICRODIL/AGAR DIL: CPT

## 2020-06-13 PROCEDURE — 74011250636 HC RX REV CODE- 250/636: Performed by: EMERGENCY MEDICINE

## 2020-06-13 PROCEDURE — 71045 X-RAY EXAM CHEST 1 VIEW: CPT

## 2020-06-13 PROCEDURE — 87040 BLOOD CULTURE FOR BACTERIA: CPT

## 2020-06-13 PROCEDURE — 74011250636 HC RX REV CODE- 250/636: Performed by: INTERNAL MEDICINE

## 2020-06-13 PROCEDURE — 74011000258 HC RX REV CODE- 258: Performed by: INTERNAL MEDICINE

## 2020-06-13 PROCEDURE — 80053 COMPREHEN METABOLIC PANEL: CPT

## 2020-06-13 RX ORDER — BUMETANIDE 0.25 MG/ML
1 INJECTION INTRAMUSCULAR; INTRAVENOUS ONCE
Status: DISCONTINUED | OUTPATIENT
Start: 2020-06-13 | End: 2020-06-13 | Stop reason: CLARIF

## 2020-06-13 RX ORDER — BUMETANIDE 0.25 MG/ML
1 INJECTION INTRAMUSCULAR; INTRAVENOUS ONCE
Status: DISCONTINUED | OUTPATIENT
Start: 2020-06-13 | End: 2020-06-13 | Stop reason: ALTCHOICE

## 2020-06-13 RX ORDER — IPRATROPIUM BROMIDE AND ALBUTEROL SULFATE 2.5; .5 MG/3ML; MG/3ML
3 SOLUTION RESPIRATORY (INHALATION)
Status: COMPLETED | OUTPATIENT
Start: 2020-06-13 | End: 2020-06-13

## 2020-06-13 RX ORDER — GUAIFENESIN 600 MG/1
1200 TABLET, EXTENDED RELEASE ORAL 2 TIMES DAILY
Status: DISCONTINUED | OUTPATIENT
Start: 2020-06-13 | End: 2020-06-17 | Stop reason: HOSPADM

## 2020-06-13 RX ORDER — IPRATROPIUM BROMIDE AND ALBUTEROL SULFATE 2.5; .5 MG/3ML; MG/3ML
3 SOLUTION RESPIRATORY (INHALATION)
Status: DISCONTINUED | OUTPATIENT
Start: 2020-06-13 | End: 2020-06-17 | Stop reason: HOSPADM

## 2020-06-13 RX ORDER — DILTIAZEM HYDROCHLORIDE 30 MG/1
30 TABLET, FILM COATED ORAL
Status: CANCELLED | OUTPATIENT
Start: 2020-06-13

## 2020-06-13 RX ORDER — MAGNESIUM SULFATE HEPTAHYDRATE 40 MG/ML
2 INJECTION, SOLUTION INTRAVENOUS ONCE
Status: COMPLETED | OUTPATIENT
Start: 2020-06-13 | End: 2020-06-13

## 2020-06-13 RX ORDER — MAGNESIUM SULFATE HEPTAHYDRATE 40 MG/ML
2 INJECTION, SOLUTION INTRAVENOUS ONCE
Status: DISCONTINUED | OUTPATIENT
Start: 2020-06-13 | End: 2020-06-13

## 2020-06-13 RX ORDER — TAMSULOSIN HYDROCHLORIDE 0.4 MG/1
0.4 CAPSULE ORAL EVERY EVENING
Status: CANCELLED | OUTPATIENT
Start: 2020-06-13

## 2020-06-13 RX ORDER — BUDESONIDE 0.25 MG/2ML
500 INHALANT ORAL
Status: DISCONTINUED | OUTPATIENT
Start: 2020-06-13 | End: 2020-06-17 | Stop reason: HOSPADM

## 2020-06-13 RX ORDER — BUMETANIDE 0.25 MG/ML
1 INJECTION INTRAMUSCULAR; INTRAVENOUS ONCE
Status: COMPLETED | OUTPATIENT
Start: 2020-06-13 | End: 2020-06-13

## 2020-06-13 RX ADMIN — PIPERACILLIN AND TAZOBACTAM 3.38 G: 3; .375 INJECTION, POWDER, LYOPHILIZED, FOR SOLUTION INTRAVENOUS at 16:27

## 2020-06-13 RX ADMIN — METHYLPREDNISOLONE SODIUM SUCCINATE 60 MG: 40 INJECTION, POWDER, FOR SOLUTION INTRAMUSCULAR; INTRAVENOUS at 17:59

## 2020-06-13 RX ADMIN — IPRATROPIUM BROMIDE AND ALBUTEROL SULFATE 3 ML: .5; 3 SOLUTION RESPIRATORY (INHALATION) at 20:38

## 2020-06-13 RX ADMIN — IPRATROPIUM BROMIDE AND ALBUTEROL SULFATE 3 ML: .5; 3 SOLUTION RESPIRATORY (INHALATION) at 10:59

## 2020-06-13 RX ADMIN — BUMETANIDE 1 MG: 0.25 INJECTION, SOLUTION INTRAMUSCULAR; INTRAVENOUS at 16:34

## 2020-06-13 RX ADMIN — BUDESONIDE 500 MCG: 0.25 INHALANT RESPIRATORY (INHALATION) at 20:38

## 2020-06-13 RX ADMIN — PIPERACILLIN AND TAZOBACTAM 3.38 G: 3; .375 INJECTION, POWDER, LYOPHILIZED, FOR SOLUTION INTRAVENOUS at 21:57

## 2020-06-13 RX ADMIN — MAGNESIUM SULFATE IN WATER 2 G: 40 INJECTION, SOLUTION INTRAVENOUS at 11:44

## 2020-06-13 RX ADMIN — GUAIFENESIN 1200 MG: 600 TABLET, EXTENDED RELEASE ORAL at 16:26

## 2020-06-13 RX ADMIN — GUAIFENESIN 1200 MG: 600 TABLET, EXTENDED RELEASE ORAL at 21:56

## 2020-06-13 NOTE — PROGRESS NOTES
Problem: Dysphagia (Adult)  Goal: *Acute Goals and Plan of Care (Insert Text)  Description: Dysphagia Present: mild oral, ? pharyngeal?  Aspiration: no overt s/sx aspiration, audible grunts/ sounds upon expiration with gurgly quality      Recommendations:  Diet: dental soft solids/ thin until MBS completed  Meds: as tolerated  Aspiration Precautions  Oral Care TID  Other: [facility] [place]    Goals:  Patient will:  1. Tolerate PO trials with no overt s/s aspiration or distress in 4/5 trials  2. Utilize compensatory swallow strategies/maneuvers (decrease bite/sip, size/rate, alt. liq/sol) with min cues in 4/5 trials  3. Perform oral-motor/laryngeal strengthening exercises with min cues to increase oropharyngeal swallow function   4. Complete an objective swallow study (i.e., MBSS) to assess swallow integrity, r/o aspiration, and determine of safest LRD, min A  Outcome: Progressing Towards Goal      SPEECH LANGUAGE PATHOLOGY BEDSIDE SWALLOW   EVALUATION & TREATMENT     Patient: Luz Mcrae (86 y.o. male)  Date: 6/13/2020  Primary Diagnosis: Hyponatremia [E87.1]        Precautions: aspiration     PLOF: as per H&P    ASSESSMENT :  Based on the objective data described below, the patient presents with mild oral, possible pharyngeal dysphagia in setting of SOB 1 week PTA and difficulty clearing secretions. Pt known to this SLP from previous hospitalizations. Pt awake, alert, sitting EOB eating dinner, fair lingual/ labial ROM/ coordination, mildly decreased strength, natural dentition, limited. Pt denied odynophagia, globus sensation, or s/sx aspiration with meals. Pt reported coughing throughout the day \"but I think that's because of the phlegm. \"  Pt with hoarse/ harsh vocal quality with intermittent periods of spasmodic type dysphonia, and slightly more rattling quality from this SLP last saw pt. Pt accepted thin liquids via straw and dental soft solids.   Pt demo timely mastication of soft solids (confirms eating soft solids at home), fair A-P transit with good oral clearing, timely swallow response with weak laryngeal elevation, pt without overt/ soft/ silent s/sx aspiration following trials, however, pt with audible expirations, grunting like with slight gurgly quality. Rec: change solids to dental soft, continue thin liquids, aspiration precautions, agree with MBS Monday to r/o aspiration and determine swallow integrity. Pt participated in [facility] 7/2019 with the following results        \"mild oral dysphagia. Pt taking PO trials of thin liquid via cup/straw and tandem drinking, puree, solid, and pill with thin liquid wash without aspiration or penetration. Pt with increased mastication time with solid texture. Swallow characterized by functional laryngeal elevation and timely swallow initiation. \"    TREATMENT :  Skilled therapy initiated; Educated pt on aspiration precautions and importance of compensatory swallow techniques to decrease aspiration risk (HOB upright (or EOB) for all po intake and >/= 45* ~30 minutes after po, decreased rate of intake, small bites/ sips); verbalized comprehension. SLP to follow as indicated. Patient will benefit from skilled intervention to address the above impairments. Patient's rehabilitation potential is considered to be Good  Factors which may influence rehabilitation potential include:   [x]            None noted  []            Mental ability/status  []            Medical condition  []            Home/family situation and support systems  []            Safety awareness  []            Pain tolerance/management  []            Other:      PLAN :  Recommendations and Planned Interventions:  change solids to dental soft, continue thin liquids, aspiration precautions, agree with MBS Monday to r/o aspiration and determine swallow integrity. Frequency/Duration: Patient will be followed by speech-language pathology 1-2 times per day/3-5 days per week to address goals.   Discharge Recommendations: Home Health, Inpatient Rehab, [place], and To Be Determined     SUBJECTIVE:   Patient stated I think it was because of the phlegm.      OBJECTIVE:     Past Medical History:   Diagnosis Date    Brain aneurysm     Cancer ([address]     prostate    COPD (chronic obstructive pulmonary disease) (HCC)     Hypertension     Nocturia     Pneumonia     Radiation effect      Past Surgical History:   Procedure Laterality Date    HX HERNIA REPAIR       Home Situation:   Home Situation  Home Environment: Trailer/mobile home  One/Two Story Residence: One story  Living Alone: No  Support Systems: Family member(s), Friends \ neighbors  Patient Expects to be Discharged to[de-identified] Trailer/mobile home  Current DME Used/Available at Home: Oxygen, portable    Diet prior to admission: dental soft/ thin  Current Diet:  regular/ thin     Cognitive and Communication Status:  Neurologic State: Alert  Orientation Level: Oriented X4  Cognition: Follows commands  Perception: Appears intact  Perseveration: No perseveration noted  Safety/Judgement: Fall prevention  Oral Assessment:  Oral Assessment  Labial: No impairment  Dentition: Natural;Limited  Oral Hygiene: fair  Lingual: Decreased strength  Velum: No impairment  Mandible: No impairment  P.O. Trials:  Patient Position: EOB  Vocal quality prior to P.O.: Hoarse;Spasmodic dysphonia  Consistency Presented: Thin liquid;Mechanical soft  How Presented: Self-fed/presented;Straw;Successive swallows     Bolus Acceptance: No impairment  Bolus Formation/Control: Impaired  Type of Impairment: Mastication  Propulsion: No impairment  Oral Residue: None  Initiation of Swallow: No impairment  Laryngeal Elevation: Weak  Aspiration Signs/Symptoms: None  Pharyngeal Phase Characteristics: Double swallow; Suspected pharyngeal residue  Effective Modifications: Small sips and bites  Cues for Modifications:  Moderate       Oral Phase Severity: Mild  Pharyngeal Phase Severity : No impairment    PAIN:  Pain level pre-treatment: 0/10   Pain level post-treatment: 0/10   Pain Intervention(s): Medication (see MAR); Rest, Ice, Repositioning   Response to intervention: Nurse notified, See doc flow    After treatment:   []            Patient left in no apparent distress sitting up in chair  [x]            Patient left in no apparent distress in bed  [x]            Call bell left within reach  [x]            Nursing notified  []            Family present  []            Caregiver present  []            Bed alarm activated    COMMUNICATION/EDUCATION:   [x]            Aspiration precautions; swallow safety; compensatory techniques. [x]            Patient participated as able in goal setting and plan of care. []            Patient/family agree to work toward stated goals and plan of care. []            Patient understands intent and goals of therapy; neutral about participation. []            Patient unable to participate in goal setting/plan of care; educ ongoing with interdisciplinary staff  [x]         Posted safety precautions in patient's room.     Thank you for this referral.  Josephine Brand MS, CCC/SLP  Time Calculation: 28 mins  Evaluation Time: 18 minutes   Treatment Time: 10 minutes

## 2020-06-13 NOTE — H&P
History & Physical    Patient: Zeny Roberts MRN: [id number]  CSN: [id number]    YOB: 1945  Age: 66 y.o. Sex: male      DOA: 6/13/2020    Chief Complaint:   Chief Complaint   Patient presents with    Shortness of Breath          HPI:     Zeny Roberts is a 66 y.o.  male who has history of COPD on home oxygen 3 L,Hypertension, brain aneurysm presents with worsening shortness of breath over the past week despite using nebulizing treatments increased oxygen in the emergency room he was given 3-4 rounds of DuoNeb magnesium and steroids with no improvement. He is noted to have difficulty clearing secretions and mucus being taken last admit for COPD exacerbation patient denies any COVID-19 exposures or recent travel he does have chronic lower extremity edema duplex done in the emergency room was negative for DVT    Past Medical History:   Diagnosis Date    Brain aneurysm     Cancer [facility])     prostate    [facility]ch[facility] obstructive [facility]) [address]     Hypertension     Nocturia     Pneumonia     Radiation effect        Past Surgical History:   Procedure Laterality Date    HX HERNIA REPAIR         Family History   Problem Relation Age of Onset    Heart Disease Mother        Social History     Socioeconomic History    Marital status:      Spouse name: Not on file    Number of children: Not on file    Years of education: Not on file    Highest education level: Not on file   Tobacco Use    Smoking status: Former Smoker     Types: Cigarettes    Smokeless tobacco: Never Used   Substance and Sexual Activity    Alcohol use: No    Drug use: Never       Prior to Admission medications    Medication Sig Start Date End Date Taking? Authorizing Provider   albuterol sulfate (PROAIR RESPICLICK) 90 mcg/actuation breath activated inhaler Take 1 Puff by inhalation every four (4) hours as needed for Wheezing.  5/13/20   Peyton Andrade, DO   albuterol (ACCUNEB) 1.25 mg/3 mL nebu Take 3 mL by inhalation every four (4) hours as needed for Wheezing (wheezing). 5/13/20   Desire Andrade,    albuterol-ipratropium (DUO-NEB) 2.5 mg-0.5 mg/3 ml nebu 3 mL by Nebulization route every four (4) hours as needed for Wheezing or Shortness of Breath. 5/6/20   Desire Andrade DO   predniSONE (STERAPRED DS) 10 mg dose pack See administration instruction per 10mg dose pack 4/14/20   Ander Eaton MD   dilTIAZem (CARDIZEM) 30 mg tablet Take 1 Tab by mouth Before breakfast, lunch, and dinner. 4/14/20   Ander Eaton MD   chlorthalidone (HYGROTEN) 25 mg tablet Take 25 mg by mouth daily. Shant, MD Blayne   acetaminophen (TYLENOL) 325 mg tablet Take 500 mg by mouth every four (4) hours as needed for Pain. Provider, Historical   OXYGEN-AIR DELIVERY SYSTEMS Take 2 L by inhalation continuous. Provider, Historical   dextran 70/hypromellose (ARTIFICIAL TEARS, PF, OP) Apply 2 Drops to eye as needed for Other (both eyes for dryness). Provider, Historical   diphenhydrAMINE (BENADRYL) 25 mg capsule Take 25 mg by mouth nightly as needed for Itching. Provider, Historical   albuterol (PROVENTIL HFA, VENTOLIN HFA, PROAIR HFA) 90 mcg/actuation inhaler Take 2 Puffs by inhalation every four (4) hours as needed for Wheezing or Shortness of Breath. 4/23/18   Hoda Medina PA-C   ipratropium (ATROVENT) 0.03 % nasal spray 2 Sprays by Both Nostrils route every twelve (12) hours as needed for Rhinitis. Shant, MD Blayne   tamsulosin (FLOMAX) 0.4 mg capsule Take 0.4 mg by mouth every evening. Blayne Bran MD       Allergies   Allergen Reactions    Aspirin Other (comments)     \"messes my stomach up\"         Review of Systems  GENERAL: Patient alert, awake and oriented times 3, able to communicate full sentences and not in distress. HEENT: No change in vision, no earache, tinnitus, sore throat or sinus congestion. NECK: No pain or stiffness.    PULMONARY: + shortness of breath, cough or wheeze. Cardiovascular: no pnd or orthopnea, no CP  GASTROINTESTINAL: No abdominal pain, nausea, vomiting or diarrhea, melena or bright red blood per rectum. GENITOURINARY: No urinary frequency, urgency, hesitancy or dysuria. MUSCULOSKELETAL+joint or muscle pain, no back pain, no recent trauma. DERMATOLOGIC: No rash, no itching, no lesions. ENDOCRINE: No polyuria, polydipsia, no heat or cold intolerance. No recent change in weight. HEMATOLOGICAL: No anemia or easy bruising or bleeding. NEUROLOGIC: No headache, seizures, numbness, tingling +weakness. Physical Exam:     Physical Exam:  Visit Vitals  /75   Pulse 88   Temp 97.3 °F (36.3 °C)   Resp 20   Ht 5' 8\" (1.727 m)   Wt 90.7 kg (200 lb)   SpO2 96%   BMI 30.41 kg/m²    O2 Flow Rate (L/min): 2 l/min O2 Device: Nasal cannula    Temp (24hrs), Av.3 °F (36.3 °C), Min:97.3 °F (36.3 °C), Max:97.3 °F (36.3 °C)     0701 -  1900  In: 50 [I.V.:50]  Out: -    No intake/output data recorded. General:  Alert, cooperative, no distress, appears stated age. Head: Normocephalic, without obvious abnormality, atraumatic. Eyes:  Conjunctivae/corneas clear. PERRL, EOMs intact. Nose: Nares normal. No drainage or sinus tenderness. Neck: Supple, symmetrical, trachea midline, no adenopathy, thyroid: no enlargement, no carotid bruit and no JVD. Lungs:   Clear to auscultation bilaterally. Diffuse expiratory wheeze   Heart:  Regular rate and rhythm, S1, S2 normal.     Abdomen: Soft, non-tender. Bowel sounds normal.    Extremities: Extremities normal, atraumatic, no cyanosis or edema. Pulses: 2+ and symmetric all extremities. Skin:  No rashes or lesions   Neurologic: AAOx3, No focal motor or sensory deficit. Labs Reviewed: All lab results for the last 24 hours reviewed.   CXR,  and EKG    Procedures/imaging: see electronic medical records for all procedures/Xrays and details which were not copied into this note but were reviewed prior to creation of Plan      Assessment/Plan     Active Problems:    * No active hospital problems. *  . 1. COPD exacerbation starting on Solu-Medrol duo nebs RT consult obtain empiric Zosyn will also use Mucinex and sputum cultures check urine Legionella and streptococcal antigen    3. Hyponatremia holding chlorthalidone for now IV Bumex for lower extremity edema check echo to exclude early CHF  DVT/GI Prophylaxis: Hep SQ    4. Possible aspiration will check swallowing eval placed on aspiration precaution obtain video swallowing    5. Hypertension resuming short acting diltiazem    Discussed with patient at bedside about hospital admission and my plan care, who understood and agree with my plan care.     Khadra Tomlinson MD  6/13/2020 1:57 PM

## 2020-06-13 NOTE — ED NOTES
TRANSFER - OUT REPORT:    Verbal report given to Suman RN(name) on Keven Hidden  being transferred to [facility] (unit) for routine progression of care       Report consisted of patients Situation, Background, Assessment and   Recommendations(SBAR). Information from the following report(s) ED Summary, Procedure Summary, Intake/Output, MAR, Recent Results and Cardiac Rhythm NSR was reviewed with the receiving nurse. Lines:   Peripheral IV 06/13/20 Right Antecubital (Active)        Opportunity for questions and clarification was provided.       Patient transported with:   Monitor  O2 @ 2L liters

## 2020-06-13 NOTE — PROGRESS NOTES
Speech Pathology Note      Swallow eval completed b/s with recs of dental soft solids/ thin liquids, aspiration precautions. Full report to follow.     Thank you for this referral,  Sabiha Cat MS, CCC/SLP  Speech- Language Pathologist

## 2020-06-13 NOTE — PROGRESS NOTES
Problem: Falls - Risk of  Goal: *Absence of Falls  Description: Document Fredy Cease Fall Risk and appropriate interventions in the flowsheet.   Outcome: Progressing Towards Goal  Note: Fall Risk Interventions:  Mobility Interventions: Communicate number of staff needed for ambulation/transfer         Medication Interventions: Patient to call before getting OOB, Teach patient to arise slowly                   Problem: Patient Education: Go to Patient Education Activity  Goal: Patient/Family Education  Outcome: Progressing Towards Goal

## 2020-06-13 NOTE — ED TRIAGE NOTES
Triage: pt with increased SOB today. Uses 2L NC O2 at home. Pt with generalized LE edema, slight redness.

## 2020-06-13 NOTE — ED PROVIDER NOTES
EMERGENCY DEPARTMENT HISTORY AND PHYSICAL EXAM    Date: 6/13/2020  Patient Name: Supriya Nielsen    History of Presenting Illness     Chief Complaint   Patient presents with    Shortness of Breath         History Provided By: Patient and EMS    10:38 AM  Supriya Nielsen is a 66 y.o. male with PMHX of COPD on 2 L home oxygen who presents to the emergency department C/O shortness of breath and cough. Patient states symptoms started 3 days ago and progressively worsened. He denies any chest pain, fever, nausea, vomiting. He does note increasing lower extremity edema. No known sick contacts or recent travel. Received 1 DuoNeb and IV Solu-Medrol were given en route by EMS. PCP: Yvonne Raphael MD    Current Facility-Administered Medications   Medication Dose Route Frequency Provider Last Rate Last Dose    methylPREDNISolone (PF) (SOLU-MEDROL) injection 60 mg  60 mg IntraVENous Q6H Deya Jordan MD        guaiFENesin ER [facility]) tablet 1,200 mg  1,200 mg Oral BID Deya Jordan MD        piperacillin-tazobactam (ZOSYN) 3.375 g in 0.9% sodium chloride (MBP/ADV) 100 mL MBP  3.375 g IntraVENous Q6H Deya Jordan MD        albuterol-ipratropium (DUO-NEB) 2.5 MG-0.5 MG/3 ML  3 mL Nebulization Q4H RT Deya Jordan MD        budesonide (PULMICORT) 250 mcg/2ml nebulizer susp  500 mcg Nebulization BID RT Deya Jordan MD         Current Outpatient Medications   Medication Sig Dispense Refill    albuterol sulfate (PROAIR RESPICLICK) 90 mcg/actuation breath activated inhaler Take 1 Puff by inhalation every four (4) hours as needed for Wheezing. 1 Inhaler 0    albuterol (ACCUNEB) 1.25 mg/3 mL nebu Take 3 mL by inhalation every four (4) hours as needed for Wheezing (wheezing). 25 Each 0    albuterol-ipratropium (DUO-NEB) 2.5 mg-0.5 mg/3 ml nebu 3 mL by Nebulization route every four (4) hours as needed for Wheezing or Shortness of Breath.  30 Nebule 0    predniSONE (STERAPRED DS) 10 mg dose pack See administration instruction per 10mg dose pack 21 Tab 0    dilTIAZem (CARDIZEM) 30 mg tablet Take 1 Tab by mouth Before breakfast, lunch, and dinner. 90 Tab 0    chlorthalidone (HYGROTEN) 25 mg tablet Take 25 mg by mouth daily.  acetaminophen (TYLENOL) 325 mg tablet Take 500 mg by mouth every four (4) hours as needed for Pain.  OXYGEN-AIR DELIVERY SYSTEMS Take 2 L by inhalation continuous.  dextran 70/hypromellose (ARTIFICIAL TEARS, PF, OP) Apply 2 Drops to eye as needed for Other (both eyes for dryness).  diphenhydrAMINE (BENADRYL) 25 mg capsule Take 25 mg by mouth nightly as needed for Itching.  albuterol (PROVENTIL HFA, VENTOLIN HFA, PROAIR HFA) 90 mcg/actuation inhaler Take 2 Puffs by inhalation every four (4) hours as needed for Wheezing or Shortness of Breath. 1 Inhaler 1    ipratropium (ATROVENT) 0.03 % nasal spray 2 Sprays by Both Nostrils route every twelve (12) hours as needed for Rhinitis.  tamsulosin (FLOMAX) 0.4 mg capsule Take 0.4 mg by mouth every evening. Past History     Past Medical History:  Past Medical History:   Diagnosis Date    Brain aneurysm     Cancer [address]     prostate    COPD (chronic obstructive pulmonary disease) ([address]     Hypertension     Nocturia     Pneumonia     Radiation effect        Past Surgical History:  Past Surgical History:   Procedure Laterality Date    HX HERNIA REPAIR         Family History:  Family History   Problem Relation Age of Onset    Heart Disease Mother        Social History:  Social History     Tobacco Use    Smoking status: Former Smoker     Types: Cigarettes    Smokeless tobacco: Never Used   Substance Use Topics    Alcohol use: No    Drug use: Never       Allergies: Allergies   Allergen Reactions    Aspirin Other (comments)     \"messes my stomach up\"         Review of Systems   Review of Systems   Constitutional: Negative for fever.    Respiratory: Positive for cough and shortness of breath. Cardiovascular: Positive for leg swelling. Negative for chest pain. Gastrointestinal: Negative for abdominal pain. All other systems reviewed and are negative.         Physical Exam     Vitals:    06/13/20 1100 06/13/20 1215 06/13/20 1223 06/13/20 1315   BP:  156/80  162/75   Pulse:  91 90 88   Resp:  23 18 20   Temp:       SpO2: (P) 98% 99% 99% 96%   Weight:       Height:         Physical Exam    Nursing notes and vital signs reviewed    Constitutional: Non toxic appearing, elderly, moderate distress  Head: Normocephalic, Atraumatic  Eyes: EOMI  Neck: Supple  Cardiovascular: Regular rate and rhythm, no murmurs, rubs, or gallops  Chest: Increased work of breathing and equal chest excursion bilaterally, tachypneic  Lungs: Coarse expiratory wheezes to ausculation bilaterally  Abdomen: Soft, non tender, non distended  Back: No evidence of trauma or deformity  Extremities: No evidence of trauma or deformity, moderate bilateral LE edema  Skin: Warm and dry, normal cap refill  Neuro: Alert and appropriate  Psychiatric: Normal mood and affect      Diagnostic Study Results     Labs -     Recent Results (from the past 12 hour(s))   EKG, 12 LEAD, INITIAL    Collection Time: 06/13/20 10:46 AM   Result Value Ref Range    Ventricular Rate 91 BPM    Atrial Rate 91 BPM    P-R Interval 164 ms    QRS Duration 78 ms    Q-T Interval 368 ms    QTC Calculation (Bezet) 452 ms    Calculated P Axis 64 degrees    Calculated R Axis 17 degrees    Calculated T Axis 35 degrees    Diagnosis       Sinus rhythm with occasional premature ventricular complexes  Otherwise normal ECG  When compared with ECG of 13-MAY-2020 15:41,  premature ventricular complexes are now present  Confirmed by Irasema Cano MD, -- ([id number]) on 6/13/2020 1:44:19 PM     CBC WITH AUTOMATED DIFF    Collection Time: 06/13/20 11:05 AM   Result Value Ref Range    WBC 11.9 4.6 - 13.2 K/uL    RBC 3.79 (L) 4.70 - 5.50 M/uL    HGB 10.9 (L) 13.0 - 16.0 g/dL    HCT 32.4 (L) 36.0 - 48.0 %    MCV 85.5 74.0 - 97.0 FL    MCH 28.8 24.0 - 34.0 PG    MCHC 33.6 31.0 - 37.0 g/dL    RDW 13.8 11.6 - 14.5 %    PLATELET 612 097 - 461 K/uL    MPV 8.5 (L) 9.2 - 11.8 FL    NEUTROPHILS 80 (H) 40 - 73 %    LYMPHOCYTES 9 (L) 21 - 52 %    MONOCYTES 8 3 - 10 %    EOSINOPHILS 3 0 - 5 %    BASOPHILS 0 0 - 2 %    ABS. NEUTROPHILS 9.4 (H) 1.8 - 8.0 K/UL    ABS. LYMPHOCYTES 1.1 0.9 - 3.6 K/UL    ABS. MONOCYTES 1.0 0.05 - 1.2 K/UL    ABS. EOSINOPHILS 0.4 0.0 - 0.4 K/UL    ABS. BASOPHILS 0.1 0.0 - 0.1 K/UL    DF AUTOMATED     METABOLIC PANEL, COMPREHENSIVE    Collection Time: 06/13/20 11:05 AM   Result Value Ref Range    Sodium 127 (L) 136 - 145 mmol/L    Potassium 3.8 3.5 - 5.5 mmol/L    Chloride 93 (L) 100 - 111 mmol/L    CO2 25 21 - 32 mmol/L    Anion gap 9 3.0 - 18 mmol/L    Glucose 104 (H) 74 - 99 mg/dL    BUN 12 7.0 - 18 MG/DL    Creatinine 1.09 0.6 - 1.3 MG/DL    BUN/Creatinine ratio 11 (L) 12 - 20      GFR est AA >60 >60 ml/min/1.73m2    GFR est non-AA >60 >60 ml/min/1.73m2    Calcium 8.7 8.5 - 10.1 MG/DL    Bilirubin, total 0.6 0.2 - 1.0 MG/DL    ALT (SGPT) 11 (L) 16 - 61 U/L    AST (SGOT) 11 10 - 38 U/L    Alk.  phosphatase 107 45 - 117 U/L    Protein, total 6.7 6.4 - 8.2 g/dL    Albumin 3.2 (L) 3.4 - 5.0 g/dL    Globulin 3.5 2.0 - 4.0 g/dL    A-G Ratio 0.9 0.8 - 1.7     NT-PRO BNP    Collection Time: 06/13/20 11:05 AM   Result Value Ref Range    NT pro- 0 - 1,800 PG/ML   CARDIAC PANEL,(CK, CKMB & TROPONIN)    Collection Time: 06/13/20 11:05 AM   Result Value Ref Range    CK - MB 5.8 (H) <3.6 ng/ml    CK-MB Index 3.2 0.0 - 4.0 %     39 - 308 U/L    Troponin-I, QT <0.02 0.0 - 0.045 NG/ML   POC G3    Collection Time: 06/13/20 11:16 AM   Result Value Ref Range    Device: NASAL CANNULA      Flow rate (POC) 2 L/M    FIO2 (POC) 28 %    pH (POC) 7.446 7.35 - 7.45      pCO2 (POC) 34.9 (L) 35.0 - 45.0 MMHG    pO2 (POC) 105 (H) 80 - 100 MMHG    HCO3 (POC) 24.1 22 - 26 MMOL/L    sO2 (POC) 98 (H) 92 - 97 %    Base excess (POC) 0 mmol/L    Allens test (POC) YES      Site LEFT RADIAL      Specimen type (POC) ARTERIAL      Performed by Ruben Ramos    LACTIC ACID    Collection Time: 06/13/20 11:48 AM   Result Value Ref Range    Lactic acid 1.2 0.4 - 2.0 MMOL/L       Radiologic Studies -   XR CHEST PORT   Final Result   IMPRESSION:      No acute cardiopulmonary abnormality. XR SWALLOW FUNC VIDEO    (Results Pending)     CT Results  (Last 48 hours)    None        CXR Results  (Last 48 hours)               06/13/20 1107  XR CHEST PORT Final result    Impression:  IMPRESSION:       No acute cardiopulmonary abnormality. Narrative:  EXAM: XR CHEST PORT       CLINICAL INDICATION/HISTORY: SOB   -Additional: None       COMPARISON: 5/13/2020       TECHNIQUE: Portable frontal view of the chest       _______________       FINDINGS:       SUPPORT DEVICES: None. HEART AND MEDIASTINUM: Cardiomediastinal silhouette within normal limits. LUNGS AND PLEURAL SPACES: Right pleural plaque. Bibasilar atelectasis.  No dense   consolidation, large effusion or pneumothorax.       _______________                 Medications given in the ED-  Medications   methylPREDNISolone (PF) (SOLU-MEDROL) injection 60 mg (has no administration in time range)   guaiFENesin ER (MUCINEX) tablet 1,200 mg (has no administration in time range)   piperacillin-tazobactam (ZOSYN) 3.375 g in 0.9% sodium chloride (MBP/ADV) 100 mL MBP (has no administration in time range)   albuterol-ipratropium (DUO-NEB) 2.5 MG-0.5 MG/3 ML (has no administration in time range)   budesonide (PULMICORT) 250 mcg/2ml nebulizer susp (has no administration in time range)   albuterol-ipratropium (DUO-NEB) 2.5 MG-0.5 MG/3 ML (3 mL Nebulization Given 6/13/20 1059)   albuterol-ipratropium (DUO-NEB) 2.5 MG-0.5 MG/3 ML (3 mL Nebulization Given 6/13/20 1059)   magnesium sulfate 2 g/50 ml IVPB (premix or compounded) (0 g IntraVENous IV Completed 6/13/20 1250) Medical Decision Making   I am the first provider for this patient. I reviewed the vital signs, available nursing notes, past medical history, past surgical history, family history and social history. Vital Signs-Reviewed the patient's vital signs. Pulse Oximetry Analysis -98 % on room air, not hypoxic    Cardiac Monitor:  Rate: 93 bpm  Rhythm: Normal sinus    EKG interpretation: (Preliminary)  EKG read by Dr. Lorena Knight at 10:52 AM  Minus rhythm with occasional PVCs at a rate of 91 bpm, VT interval 164 ms, QRS duration of 78 ms    Records Reviewed: Nursing Notes, Old Medical Records and Previous electrocardiograms    Provider Notes (Medical Decision Making): Chris Cardoso is a 66 y.o. male presenting for shortness of breath. Patient's exam, imaging, labs consistent with COPD exacerbation and improved here after multiple rounds of nebs, steroids, IV magnesium. Patient continues to have difficulty clearing his secretions and suspect may have a swallowing dysfunction component to his symptoms. In addition patient has significant hyponatremia. Discussed with hospitalist for further inpatient management. Procedures:  Procedures    ED Course:   CONSULT NOTE:   1:57 PM  Dr. Lorena Knight spoke with Dr. Tegan Aguirre   Specialty: Hospitalist  Discussed pt's hx, disposition, and available diagnostic and imaging results over the telephone. Reviewed care plans. Admit to remote telemetry. Diagnosis and Disposition     Critical Care Time: 3:34 PM  I have spent 31 minutes of critical care time involved in lab review, consultations with specialist, family decision-making, and documentation. During this entire length of time I was immediately available to the patient. Critical Care:   The reason for providing this level of medical care for this critically ill patient was due a critical illness that impaired one or more vital organ systems such that there was a high probability of imminent or life threatening deterioration in the patients condition. This care involved high complexity decision making to assess, manipulate, and support vital system functions, to treat this degreee vital organ system failure and to prevent further life threatening deterioration of the patients condition. Core Measures:  For Hospitalized Patients:    1. Hospitalization Decision Time:  The decision to hospitalize the patient was made by Dr. Cassia Herrmann at 1:45 PM on 6/13/2020    2. Aspirin: Aspirin was not given because the patient did not present with a stroke at the time of their Emergency Department evaluation    1:57 PM  Patient is being admitted to the hospital by Dr. Ellen Elaine. The results of their tests and reasons for their admission have been discussed with them and/or available family. They convey agreement and understanding for the need to be admitted and for their admission diagnosis. CONDITIONS ON ADMISSION:  Sepsis is not present at the time of admission. Deep Vein Thrombosis is not present at the time of admission. Thrombosis is not present at the time of admission. Urinary Tract Infection is not present at the time of admission. Pneumonia is not present at the time of admission. MRSA is not present at the time of admission. Wound infection is not present at the time of admission. Pressure Ulcer is not present at the time of admission. CLINICAL IMPRESSION:    1. SOB (shortness of breath)    2. Hyponatremia    3. Acute exacerbation of chronic obstructive pulmonary disease ([facility]D) ([facility]      _______________________________      Please note that this dictation was completed with [employer], the computer voice recognition software. Quite often unanticipated grammatical, syntax, homophones, and other interpretive errors are inadvertently transcribed by the computer software. Please disregard these errors. Please excuse any errors that have escaped final proofreading.

## 2020-06-13 NOTE — PROGRESS NOTES
1500-Telephone/Verbal shift change report given to TK BergRN (oncoming nurse) by MARKOS Arreola RN (offgoing nurse). Report included the following information SBAR, Kardex, Intake/Output and MAR.     1543-Patient arrived on unit. Patient A/OX4. Patient in bed resting quietly. No complaints of pain or nausea at this time. 1920-Patient requesting breathing treatment. Respiratory contacted. 1947-Bedside and Verbal shift change report given to Davide Keller (oncoming nurse) by Cristine Andrews (offgoing nurse). Report included the following information SBAR, Kardex, Intake/Output and MAR. REQUESTING PHYSICIAN: Dr. Link    REASON FOR CONSULT :Cardiology consultation to evaluate for continued use of Multaq in the inpatient setting.      CHIEF COMPLAINT: LLE pain and erythma    HPI:  89 year old man with a history of CAD s/p stents, HTN, HLD, aortic stenosis s/p TAVR, PAF, BPH presented to the ER complaining of left foot redness and swelling x approximately 10 days; no significant improvement with course of outpatient antibiotics and corticosteroids; he describes severe redness.  Patient unable to identify exacerbating / alleviating factors.  He has been doing well from a cardiac standpoint but describes mild, chronic dyspnea; denies: angina, palpitations, orthopnea, syncope.  Mr. Campo has been taking Multaq "for a long time" and tolerating therapy with no adverse effects; in addition, he describes regular, outpatient cardiac care.    PSH: Lumbar spine surgery, L hip replacement 2006, s/p AVR, stents, colon repair    Fam Hx: Noncontributory to presenting problem    SOCIAL HISTORY: Alcohol: Denied  Smoking: Nonsmoker  Drug Use: Denied  Marital Status:     MEDICATIONS  (STANDING):  clopidogrel Tablet 75milliGRAM(s) Oral daily  diltiazem   CD 120milliGRAM(s) Oral daily  aspirin  chewable 81milliGRAM(s) Oral daily  dronedarone 400milliGRAM(s) Oral daily  finasteride 5milliGRAM(s) Oral daily  tamsulosin 0.4milliGRAM(s) Oral daily  enalapril 5milliGRAM(s) Oral two times a day  atorvastatin 20milliGRAM(s) Oral at bedtime  cefTRIAXone   IVPB 1Gram(s) IV Intermittent every 24 hours    MEDICATIONS  (PRN):  acetaminophen   Tablet 650milliGRAM(s) Oral every 6 hours PRN For Temp greater than 38 C (100.4 F)    ALLERGIES: No Known Allergies    REVIEW OF SYSTEMS:    CONSTITUTIONAL: No weakness, fevers or chills  EYES/ENT: No visual changes;  No vertigo or throat pain   NECK: No pain or stiffness  RESPIRATORY: No cough, wheezing, hemoptysis; No shortness of breath  CARDIOVASCULAR: Unchanged/stable dyspnea.  No chest pain or palpitations  GASTROINTESTINAL: No abdominal pain. No nausea, vomiting, or hematemesis; No diarrhea or constipation. No melena or hematochezia.  GENITOURINARY: No dysuria, frequency or hematuria  NEUROLOGICAL: No numbness or weakness  SKIN: No itching or rash; left foot erythema/pain/swelling  All other review of systems is negative unless indicated above    VITAL SIGNS:   Vital Signs Last 24 Hrs  T(C): 37, Max: 37 (02-07 @ 08:44)  T(F): 98.6, Max: 98.6 (02-07 @ 08:44)  HR: 63 (63 - 73)  BP: 158/62 (131/63 - 158/62)  BP(mean): --  RR: 18 (18 - 18)  SpO2: 94% (94% - 94%)      PHYSICAL EXAM:  Constitutional: Appears younger than his stated age. NAD, awake and alert, well-developed  Eyes:  EOMI,  Pupils round, No oral cyanosis.  Pulmonary: Non-labored, breath sounds are clear bilaterally, No wheezing, rales or rhonchi  Cardiovascular: S1 and S2, regular rate and rhythm; I/VI murmur  Gastrointestinal: Bowel Sounds present, soft, nontender.   Lymph: No peripheral edema. No cervical lymphadenopathy.  Neurological: Alert, no focal deficits  Skin: No rashes.  Psych:  Mood & affect appropriate    LABS: All Labs Reviewed:                        10.6   5.5   )-----------( 183      ( 07 Feb 2017 07:25 )             32.8                         11.4   6.5   )-----------( 224      ( 06 Feb 2017 17:12 )             35.6     07 Feb 2017 07:25    144    |  108    |  9      ----------------------------<  102    3.8     |  26     |  0.76   06 Feb 2017 17:12    144    |  108    |  11     ----------------------------<  99     3.9     |  24     |  0.90     Ca    8.2        07 Feb 2017 07:25  Ca    8.6        06 Feb 2017 17:12    TPro  6.7    /  Alb  3.4    /  TBili  0.7    /  DBili  x      /  AST  16     /  ALT  18     /  AlkPhos  85     06 Feb 2017 17:12    PTT - ( 06 Feb 2017 17:12 )  PTT:35.6 sec    EKG:   Sinus rhythm with 1st degree A-V block  Left axis deviation  Left bundle branch block

## 2020-06-13 NOTE — ROUTINE PROCESS
TRANSFER - IN REPORT:    Verbal report received from MARKOS ArreolaRN(name) on [facility]  being received from ED(unit) for routine progression of care      Report consisted of patients Situation, Background, Assessment and   Recommendations(SBAR). Information from the following report(s) SBAR, Kardex, Intake/Output and MAR was reviewed with the receiving nurse. Opportunity for questions and clarification was provided. Assessment completed upon patients arrival to unit and care assumed.

## 2020-06-14 PROCEDURE — 97162 PT EVAL MOD COMPLEX 30 MIN: CPT

## 2020-06-14 PROCEDURE — 74011250636 HC RX REV CODE- 250/636: Performed by: INTERNAL MEDICINE

## 2020-06-14 PROCEDURE — 65270000029 HC RM PRIVATE

## 2020-06-14 PROCEDURE — 94760 N-INVAS EAR/PLS OXIMETRY 1: CPT

## 2020-06-14 PROCEDURE — 74011000250 HC RX REV CODE- 250: Performed by: INTERNAL MEDICINE

## 2020-06-14 PROCEDURE — 77010033678 HC OXYGEN DAILY

## 2020-06-14 PROCEDURE — 74011250637 HC RX REV CODE- 250/637: Performed by: INTERNAL MEDICINE

## 2020-06-14 PROCEDURE — 74011000258 HC RX REV CODE- 258: Performed by: INTERNAL MEDICINE

## 2020-06-14 PROCEDURE — 94640 AIRWAY INHALATION TREATMENT: CPT

## 2020-06-14 RX ORDER — BUMETANIDE 0.25 MG/ML
1 INJECTION INTRAMUSCULAR; INTRAVENOUS ONCE
Status: COMPLETED | OUTPATIENT
Start: 2020-06-14 | End: 2020-06-14

## 2020-06-14 RX ORDER — DIPHENHYDRAMINE HYDROCHLORIDE 50 MG/ML
25 INJECTION, SOLUTION INTRAMUSCULAR; INTRAVENOUS
Status: DISCONTINUED | OUTPATIENT
Start: 2020-06-14 | End: 2020-06-17 | Stop reason: HOSPADM

## 2020-06-14 RX ORDER — HYDROCORTISONE 1 %
CREAM (GRAM) TOPICAL 2 TIMES DAILY
Status: DISCONTINUED | OUTPATIENT
Start: 2020-06-14 | End: 2020-06-17 | Stop reason: HOSPADM

## 2020-06-14 RX ADMIN — PIPERACILLIN AND TAZOBACTAM 3.38 G: 3; .375 INJECTION, POWDER, LYOPHILIZED, FOR SOLUTION INTRAVENOUS at 05:05

## 2020-06-14 RX ADMIN — IPRATROPIUM BROMIDE AND ALBUTEROL SULFATE 3 ML: .5; 3 SOLUTION RESPIRATORY (INHALATION) at 21:27

## 2020-06-14 RX ADMIN — BUDESONIDE 500 MCG: 0.25 INHALANT RESPIRATORY (INHALATION) at 21:27

## 2020-06-14 RX ADMIN — IPRATROPIUM BROMIDE AND ALBUTEROL SULFATE 3 ML: .5; 3 SOLUTION RESPIRATORY (INHALATION) at 07:50

## 2020-06-14 RX ADMIN — METHYLPREDNISOLONE SODIUM SUCCINATE 60 MG: 40 INJECTION, POWDER, FOR SOLUTION INTRAMUSCULAR; INTRAVENOUS at 05:01

## 2020-06-14 RX ADMIN — PIPERACILLIN AND TAZOBACTAM 3.38 G: 3; .375 INJECTION, POWDER, LYOPHILIZED, FOR SOLUTION INTRAVENOUS at 10:44

## 2020-06-14 RX ADMIN — DIPHENHYDRAMINE HYDROCHLORIDE 25 MG: 50 INJECTION, SOLUTION INTRAMUSCULAR; INTRAVENOUS at 21:17

## 2020-06-14 RX ADMIN — PIPERACILLIN AND TAZOBACTAM 3.38 G: 3; .375 INJECTION, POWDER, LYOPHILIZED, FOR SOLUTION INTRAVENOUS at 17:46

## 2020-06-14 RX ADMIN — GUAIFENESIN 1200 MG: 600 TABLET, EXTENDED RELEASE ORAL at 21:17

## 2020-06-14 RX ADMIN — GUAIFENESIN 1200 MG: 600 TABLET, EXTENDED RELEASE ORAL at 10:44

## 2020-06-14 RX ADMIN — HYDROCORTISONE: 1 CREAM TOPICAL at 21:55

## 2020-06-14 RX ADMIN — IPRATROPIUM BROMIDE AND ALBUTEROL SULFATE 3 ML: .5; 3 SOLUTION RESPIRATORY (INHALATION) at 05:27

## 2020-06-14 RX ADMIN — METHYLPREDNISOLONE SODIUM SUCCINATE 60 MG: 40 INJECTION, POWDER, FOR SOLUTION INTRAMUSCULAR; INTRAVENOUS at 19:46

## 2020-06-14 RX ADMIN — METHYLPREDNISOLONE SODIUM SUCCINATE 60 MG: 40 INJECTION, POWDER, FOR SOLUTION INTRAMUSCULAR; INTRAVENOUS at 00:36

## 2020-06-14 RX ADMIN — METHYLPREDNISOLONE SODIUM SUCCINATE 60 MG: 40 INJECTION, POWDER, FOR SOLUTION INTRAMUSCULAR; INTRAVENOUS at 13:24

## 2020-06-14 RX ADMIN — IPRATROPIUM BROMIDE AND ALBUTEROL SULFATE 3 ML: .5; 3 SOLUTION RESPIRATORY (INHALATION) at 00:38

## 2020-06-14 RX ADMIN — IPRATROPIUM BROMIDE AND ALBUTEROL SULFATE 3 ML: .5; 3 SOLUTION RESPIRATORY (INHALATION) at 11:30

## 2020-06-14 RX ADMIN — PIPERACILLIN AND TAZOBACTAM 3.38 G: 3; .375 INJECTION, POWDER, LYOPHILIZED, FOR SOLUTION INTRAVENOUS at 21:17

## 2020-06-14 RX ADMIN — BUMETANIDE 1 MG: 0.25 INJECTION, SOLUTION INTRAMUSCULAR; INTRAVENOUS at 14:15

## 2020-06-14 RX ADMIN — IPRATROPIUM BROMIDE AND ALBUTEROL SULFATE 3 ML: .5; 3 SOLUTION RESPIRATORY (INHALATION) at 15:32

## 2020-06-14 RX ADMIN — BUDESONIDE 500 MCG: 0.25 INHALANT RESPIRATORY (INHALATION) at 07:50

## 2020-06-14 NOTE — PROGRESS NOTES
DC Plan: TBD, provider please consider PT/OT eval orders to assist with dc planning    Chart reviewed as CM on call. Pt admitted to medical by hospitalist.  Called and spoke with pt on phone regarding dc plan. CM role explained. Pt  States he lives with wife and adult son. Family to drive home at discharge. Pt has cane, RW, home oxygen thru [address]. Denies being active with HH. FOC offered for both rehab and HH. Pt  States he has had a fall at home. Pt refused both HH and rehab after last hospitalization. Discussed need and he said \"I need to think about it\". Provider please consider PT/OT eval orders to assist with dc planning. Pt [facility] past. Pt confirms PCP is MD Downey. Pts pulmonologist is MD Candida Valdez. No immediate dc concerns identified. CM will cont to follow for transition of care needs and will be available via hospital  on weekends. Reason for Admission:  Per H&P pt is \"is a 66 y.o.  male who has history of COPD on home oxygen 3 L,Hypertension, brain aneurysm presents with worsening shortness of breath over the past week despite using nebulizing treatments increased oxygen in the emergency room he was given 3-4 rounds of DuoNeb magnesium and steroids with no improvement. He is noted to have difficulty clearing secretions and mucus being taken last admit for COPD exacerbation patient denies any COVID-19 exposures or recent travel he does have chronic lower extremity edema duplex done in the emergency room was negative for DVT\"                RUR Score:     high    PCP: First and Last name: MD Storm Hickey   Name of Practice:   Are you a current patient: Yes/No:yes   Approximate date of last visit:    Can you do a virtual visit with your PCP:              Resources/supports as identified by patient/family:                   Top Challenges facing patient (as identified by patient/family and CM):                        Finances/Medication cost? Transportation?  family              Support system or lack thereof? Wife, son                     Living arrangements? Wife, son           Self-care/ADLs/Cognition? Alert and oriented, has DME          Current Advanced Directive/Advance Care Plan: On file                          Plan for utilizing home health:    TBD, likely HH v SNF                 Transition of Care Plan:    TBD    Care Management Interventions  PCP Verified by CM: Yes  Mode of Transport at Discharge:  Other (see comment)(family)  Transition of Care Consult (CM Consult): Discharge Planning  Current Support Network: Lives with Spouse  Confirm Follow Up Transport: Family

## 2020-06-14 NOTE — ROUTINE PROCESS
1947- Bedside and Verbal shift change report given to BETTE Godinez RN (oncoming nurse) by CHRIS Mckeon RN (offgoing nurse). Report included the following information SBAR, Kardex, Intake/Output, MAR and Recent Results. 2209: Shift assessment completed. AxOx4, pt is hard of hearing with B hearing aides in place. Lungs sounds course rhonchi on expiration. Pt on 2L O2 via [place]. Abd soft and rounded with active bowel sounds. Pt reported to have last BM \"30 mins ago\". B LE swollen with redness present, Pedal pulses present but faint R/T swelling in B LE. Pt requesting neb treatments. Respiratory already aware per CHRIS Mckeon during report. Nnamdi Hendrix called respiratory again to confirm. Call bell within reach. Will continue to monitor. 2225: Reassessed BP after nebulizer tx. /65 in L arm resting. No complaints at this time. Call bell within reach. Will continue to monitor. 0345- Shift reassessment completed. Bilat upper lobes expiratory wheezing heard. Pt states breathing \"better\". Pt is scheduled for nebulizer, due at 0400 by respiratory. Pt denies any pain or discomfort. No complaints at this time. Call bell within reach. Will continue to monitor. 0515: Pt resting comfortably in bed. Inspiratory wheezing audibly heard. Neb treatments by respiratory due. Pt states he breathing is \"fine\". PC to respiratory, explained inspiratory wheezing. Respiratory on way to unit. Ice brought to pt by Mohan Mcdaniels. No complaints at this time. Call bell within reach. Will continue to monitor. 8116- Bedside and Verbal shift change report given to JASWANT Dietz RN (oncoming nurse) by BETTE Godinez RN (offgoing nurse). Report included the following information SBAR, Kardex, Intake/Output, MAR and Recent Results.

## 2020-06-14 NOTE — PROGRESS NOTES
Problem: Falls - Risk of  Goal: *Absence of Falls  Description: Document Earma Carlin Fall Risk and appropriate interventions in the flowsheet.   Outcome: Progressing Towards Goal  Note: Fall Risk Interventions:  Mobility Interventions: Communicate number of staff needed for ambulation/transfer, Patient to call before getting OOB         Medication Interventions: Patient to call before getting OOB, Teach patient to arise slowly                   Problem: Patient Education: Go to Patient Education Activity  Goal: Patient/Family Education  Outcome: Progressing Towards Goal     Problem: Gas Exchange - Impaired  Goal: *Absence of hypoxia  Outcome: Progressing Towards Goal

## 2020-06-14 NOTE — ROUTINE PROCESS
1947 Bedside and Verbal shift change report given to Tu Butler RN (oncoming nurse) by CHRIS Gracia RN (offgoing nurse). Report included the following information SBAR, Kardex, Intake/Output, MAR and Recent Results. 2209 Assessment completed per Michelle Triplett RN    1506 Bedside and Verbal shift change report given to JASWANT Dietz RN (oncoming nurse) by BETTE Bowman RN (offgoing nurse). Report included the following information SBAR, Kardex, Intake/Output, MAR and Recent Results.

## 2020-06-14 NOTE — PROGRESS NOTES
Bedside and verbal shift change report given to Ravi Kiser, RN (on coming nurse) by Zurdo Rizzo RN (off going nurse). Report included the following information SBAR, Kardex, OR Summary, Intake/Output and MAR. Shift summary:  A/O X 4. Pt remains on 2 liter of oxygen via nasal canula. Bilat expiratory wheezing noted. Pt is receiving solu-medrol and neb treatment with RT. Pt reports itching to BLE. Per Dr Ellen Elaine ordered Benadryl injection 25 mg PRN every 6 hours and Hydrocortisone 1% cream to apply to lower legs 2 times daily. Bedside and verbal shift change report given by Ravi Kiser RN (off going nurse) to [facility] , RN(on coming nurse). Report included the following information SBAR, Kardex, OR Summary, Intake/Output and MAR.

## 2020-06-14 NOTE — PROGRESS NOTES
Problem: Mobility Impaired (Adult and Pediatric)  Goal: *Acute Goals and Plan of Care (Insert Text)  Description: Physical Therapy Goals  Initiated 6/14/2020 and to be accomplished within 3-7 day(s)  1. Patient will move from supine to sit and sit to supine  in bed with supervision/set-up. 2.  Patient will transfer from bed to chair and chair to bed with supervision/set-up using the least restrictive device. 3.  Patient will perform sit to stand with supervision/set-up. 4.  Patient will ambulate with supervision/set-up for 100 feet with the least restrictive device. 5.  Patient will ascend/descend 3 stairs with handrail(s) with minimal assistance/contact guard assist.   Note:   PHYSICAL THERAPY EVALUATION    Patient: Yann Stewart (74 y.o. male)  Date: 6/14/2020  Primary Diagnosis: Hyponatremia [E87.1]  Precautions:   Fall    ASSESSMENT :  Based on the objective data described below, the patient presents with lower extremity weakness, decreased gait quality and endurance, impaired bed mobility and transfers, and overall limitations in functional mobility. Pt performed sit to stand with CG-Crystal. Patient ambulated 20 feet with RW, GB applied, O2 via NC, CG-Crystal; pt with labored breathing/SOB and unsteadiness during amb. Patient would benefit from skilled inpatient physical therapy to address deficits, progress as tolerated to achieve long term goals and allow safe discharge. Patient will benefit from skilled intervention to address the above impairments.   Patients rehabilitation potential is considered to be Good  Factors which may influence rehabilitation potential include:   []         None noted  []         Mental ability/status  [x]         Medical condition  []         Home/family situation and support systems  []         Safety awareness  [x]         Pain tolerance/management  []         Other:      PLAN :  Recommendations and Planned Interventions:  [x]           Bed Mobility Training []    Neuromuscular Re-Education  [x]           Transfer Training                   []    Orthotic/Prosthetic Training  [x]           Gait Training                          []    Modalities  [x]           Therapeutic Exercises          []    Edema Management/Control  [x]           Therapeutic Activities            [x]    Patient and Family Training/Education  []           Other (comment):    Frequency/Duration: Patient will be followed by physical therapy 1-2 times per day to address goals. Discharge Recommendations: Home Health vs [facility]  Further Equipment Recommendations for Discharge: N/A     SUBJECTIVE:   Patient stated I haven't been feeling too good.     OBJECTIVE DATA SUMMARY:     Past Medical History:   Diagnosis Date    Brain aneurysm     Cancer ([facility] [address]     prostate    COPD (chronic obstructive pulmonary disease) ([facility] [address]     Hypertension     Nocturia     Pneumonia     Radiation effect      Past Surgical History:   Procedure Laterality Date    HX HERNIA REPAIR       Barriers to Learning/Limitations: None  Compensate with: Visual Cues, Verbal Cues, and Tactile Cues  Prior Level of Function/Home Situation: Independent amb s/AD  Home Situation  Home Environment: Trailer/mobile home  One/Two Story Residence: One story  Living Alone: No  Support Systems: Family member(s), Friends \ neighbors  Patient Expects to be Discharged to[de-identified] Trailer/mobile home  Current DME Used/Available at Home: Oxygen, portable  Critical Behavior:  Neurologic State: Alert; Appropriate for age  Orientation Level: Oriented X4  Cognition: Follows commands   Psychosocial  Purposeful Interaction: Yes  Pt Identified Daily Priority: Clinical issues (comment)  Caritas Process: Nurture loving kindness;Establish trust;Attend basic human needs; Teaching/learning;Create healing environment  Caring Interventions: Therapeutic modalities; Reassure  Reassure: Therapeutic listening;Caring rounds; Informing  Therapeutic Modalities: Intentional therapeutic touch;Humor  Strength:    Strength: Generally decreased, functional  Tone & Sensation:   Tone: Normal  Sensation: Impaired  Range Of Motion:  AROM: Generally decreased, functional  Functional Mobility:  Transfers:  Sit to Stand: Contact guard assistance;Minimum assistance  Stand to Sit: Contact guard assistance  Balance:   Sitting: Intact  Standing: Impaired; With support  Standing - Static: Fair  Standing - Dynamic : Fair  Ambulation/Gait Training:  Distance (ft): 20 Feet (ft)  Assistive Device: Gait belt;Walker, rolling  Ambulation - Level of Assistance: Contact guard assistance;Minimal assistance  Gait Description (WDL): Exceptions to WDL  Gait Abnormalities: Decreased step clearance; Path deviations  Base of Support: Widened  Speed/Lisbet: Slow;Shuffled  Step Length: Right shortened;Left shortened  Activity Tolerance:   Fair  Please refer to the flowsheet for vital signs taken during this treatment. After treatment:   []         Patient left in no apparent distress sitting up in chair  [x]         Patient left in no apparent distress in bed  [x]         Call bell left within reach  [x]         Nursing notified  []         Caregiver present  []         Bed alarm activated    COMMUNICATION/EDUCATION:   [x]         Fall prevention education was provided and the patient/caregiver indicated understanding. [x]         Patient/family have participated as able in goal setting and plan of care. [x]         Patient/family agree to work toward stated goals and plan of care. []         Patient understands intent and goals of therapy, but is neutral about his/her participation. []         Patient is unable to participate in goal setting and plan of care.     Thank you for this referral.  Lenita Phoenix Titcomb   Time Calculation: 13 mins   Eval Complexity: History: MEDIUM  Complexity : 1-2 comorbidities / personal factors will impact the outcome/ POC Exam:MEDIUM Complexity : 3 Standardized tests and measures addressing body structure, function, activity limitation and / or participation in recreation  Presentation: MEDIUM Complexity : Evolving with changing characteristics  Clinical Decision Making:Medium Complexity    Overall Complexity:MEDIUM

## 2020-06-14 NOTE — PROGRESS NOTES
Hospitalist Progress Note    Patient: Fabby Whitlock MRN: [id number]  CSN: [id number]    YOB: 1945  Age: 66 y.o. Sex: male    DOA: 6/13/2020 LOS:  LOS: 1 day          Chief Complaint:    Fabby Whitlock is a 66 y.o.  male who has history of COPD on home oxygen 3 L,Hypertension, brain aneurysm presents with worsening shortness of breath over the past week despite using nebulizing treatments increased oxygen in the emergency room he was given 3-4 rounds of DuoNeb magnesium and steroids with no improvement. He is noted to have difficulty clearing secretions and mucus being taken last admit for COPD exacerbation patient denies any COVID-19 exposures or recent travel he does have chronic lower extremity edema duplex done in the emergency room was negative for DVT      Assessment/Plan   No active hospital problems. *  . 1. COPD exacerbation starting on Solu-Medrol duo nebs RT consult obtain empiric Zosyn will also use Mucinex and sputum cultures check urine Legionella and streptococcal antigen     3. Hyponatremia holding chlorthalidone for now IV Bumex for lower extremity edema check echo to exclude early CHF  DVT/GI Prophylaxis: Hep SQ     4.Possible aspiration will check swallowing eval placed on aspiration precaution obtain video swallowing     5.   Hypertension resuming short acting diltiazem doing well          Patient Active Problem List   Diagnosis Code    Hypertension I10    COPD (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 ([facility]) J44.9    Chronic renal disease, stage 3, moderately decreased glomerular filtration rate between 30-59 mL/min/1.73 square meter ([facility]) N18.3    Asbestosis ([facility] [address] J61    Acute exacerbation of chronic obstructive pulmonary disease (COPD) ([facility] [address] J44.1    Debility R53.81    Paroxysmal atrial fibrillation ([facility]) I48.0    Chronic respiratory failure with hypoxia ([facility]) J96.11    Tobacco dependence F17.200    Hyponatremia E87.1    Pleural effusion, right J90    COPD exacerbation (HCC) J44.1    Acute respiratory failure with hypoxia and hypercarbia (HCC) J96.01, J96.02    Hyponatremia E87.1    Advanced care planning/counseling discussion Z71.89    Hypokalemia E87.6       Subjective:        Review of systems:    Constitutional: denies fevers, chills, myalgias  Respiratory: denies +SOB, +cough  Cardiovascular: denies chest pain, palpitations  Gastrointestinal: denies nausea, vomiting, diarrhea      Vital signs/Intake and Output:  Visit Vitals  /57 (BP 1 Location: Left arm, BP Patient Position: At rest)   Pulse 93   Temp 98.7 °F (37.1 °C)   Resp 17   Ht 5' 8\" (1.727 m)   Wt 92.5 kg (204 lb)   SpO2 98%   BMI 31.02 kg/m²     Current Shift:  06/14 0701 - 06/14 1900  In: -   Out: 600 [Urine:600]  Last three shifts:  06/12 1901 - 06/14 0700  In: 597.3 [P.O.:240; I.V.:357.3]  Out: 750 [Urine:750]    Exam:    General: Well developed, alert, NAD, OX3  Head/Neck: NCAT, supple, No masses, No lymphadenopathy  CVS:Regular rate and rhythm, no M/R/G, S1/S2 heard, no thrill  Lungs:Clear to auscultation bilaterally, no wheezes, rhonchi, or rales diffuse exp wheeze  Abdomen: Soft, Nontender, No distention, Normal Bowel sounds, No hepatomegaly  Extremities: No C/C/E, pulses palpable 2+  Skin:normal texture and turgor, no rashes, no lesions  Neuro:grossly normal , follows commands  Psych:appropriate                Labs: Results:       Chemistry Recent Labs     06/13/20  1105   *   *   K 3.8   CL 93*   CO2 25   BUN 12   CREA 1.09   CA 8.7   AGAP 9   BUCR 11*      TP 6.7   ALB 3.2*   GLOB 3.5   AGRAT 0.9      CBC w/Diff Recent Labs     06/13/20  1105   WBC 11.9   RBC 3.79*   HGB 10.9*   HCT 32.4*      GRANS 80*   LYMPH 9*   EOS 3      Cardiac Enzymes Recent Labs     06/13/20  1105      CKND1 3.2      Coagulation No results for input(s): PTP, INR, APTT, INREXT in the last 72 hours.     Lipid Panel No results found for: CHOL, CHOLPOCT, CHOLX, CHLST, CHOLV, [id number], HDL, HDLP, LDL, LDLC, DLDLP, [id number], VLDLC, VLDL, TGLX, TRIGL, TRIGP, TGLPOCT, CHHD, CHHDX   BNP No results for input(s): BNPP in the last 72 hours.    Liver Enzymes Recent Labs     06/13/20  1105   TP 6.7   ALB 3.2*         Thyroid Studies Lab Results   Component Value Date/Time    TSH 0.86 10/20/2019 01:05 AM        Procedures/imaging: see electronic medical records for all procedures/Xrays and details which were not copied into this note but were reviewed prior to creation of [facility]

## 2020-06-15 ENCOUNTER — APPOINTMENT (OUTPATIENT)
Dept: NON INVASIVE DIAGNOSTICS | Age: 75
DRG: 191 | End: 2020-06-15
Attending: INTERNAL MEDICINE
Payer: MEDICARE

## 2020-06-15 ENCOUNTER — APPOINTMENT (OUTPATIENT)
Dept: GENERAL RADIOLOGY | Age: 75
DRG: 191 | End: 2020-06-15
Attending: INTERNAL MEDICINE
Payer: MEDICARE

## 2020-06-15 LAB
ALBUMIN SERPL-MCNC: 2.9 G/DL (ref 3.4–5)
ALBUMIN/GLOB SERPL: 0.9 {RATIO} (ref 0.8–1.7)
ALP SERPL-CCNC: 74 U/L (ref 45–117)
ALT SERPL-CCNC: 14 U/L (ref 16–61)
ANION GAP SERPL CALC-SCNC: 9 MMOL/L (ref 3–18)
AST SERPL-CCNC: 11 U/L (ref 10–38)
AV VELOCITY RATIO: 0.53
AV VTI RATIO: 0.5
BASOPHILS # BLD: 0 K/UL (ref 0–0.1)
BASOPHILS NFR BLD: 0 % (ref 0–2)
BILIRUB SERPL-MCNC: 0.3 MG/DL (ref 0.2–1)
BUN SERPL-MCNC: 28 MG/DL (ref 7–18)
BUN/CREAT SERPL: 22 (ref 12–20)
CALCIUM SERPL-MCNC: 8.2 MG/DL (ref 8.5–10.1)
CHLORIDE SERPL-SCNC: 97 MMOL/L (ref 100–111)
CO2 SERPL-SCNC: 27 MMOL/L (ref 21–32)
CREAT SERPL-MCNC: 1.26 MG/DL (ref 0.6–1.3)
DIFFERENTIAL METHOD BLD: ABNORMAL
ECHO AO ASC DIAM: 3.47 CM
ECHO AO ROOT DIAM: 3.62 CM
ECHO AV AREA PEAK VELOCITY: 1.6 CM2
ECHO AV AREA VTI: 1.6 CM2
ECHO AV AREA/BSA PEAK VELOCITY: 0.8 CM2/M2
ECHO AV AREA/BSA VTI: 0.8 CM2/M2
ECHO AV MEAN GRADIENT: 5.4 MMHG
ECHO AV MEAN VELOCITY: 1.07 M/S
ECHO AV PEAK GRADIENT: 10.5 MMHG
ECHO AV PEAK VELOCITY: 161.67 CM/S
ECHO AV VTI: 36.92 CM
ECHO IVC PROX: 2.67 CM
ECHO LA MAJOR AXIS: 3.88 CM
ECHO LA VOL 2C: 66.37 ML (ref 18–58)
ECHO LA VOL 4C: 43.04 ML (ref 18–58)
ECHO LA VOL BP: 65.22 ML (ref 18–58)
ECHO LA VOL/BSA BIPLANE: 31.13 ML/M2 (ref 16–28)
ECHO LA VOLUME INDEX A2C: 31.68 ML/M2 (ref 16–28)
ECHO LA VOLUME INDEX A4C: 20.54 ML/M2 (ref 16–28)
ECHO LV E' LATERAL VELOCITY: 11 CM/S
ECHO LV E' SEPTAL VELOCITY: 7 CM/S
ECHO LV EDV A2C: 82.5 ML
ECHO LV EDV A4C: 64.4 ML
ECHO LV EDV BP: 74.1 ML (ref 67–155)
ECHO LV EDV INDEX A4C: 30.7 ML/M2
ECHO LV EDV INDEX BP: 35.4 ML/M2
ECHO LV EDV NDEX A2C: 39.4 ML/M2
ECHO LV EDV TEICHHOLZ: 0.82 ML
ECHO LV EJECTION FRACTION A2C: 65 %
ECHO LV EJECTION FRACTION A4C: 67 %
ECHO LV EJECTION FRACTION BIPLANE: 65.2 % (ref 55–100)
ECHO LV ESV A2C: 28.7 ML
ECHO LV ESV A4C: 21.1 ML
ECHO LV ESV BP: 25.8 ML (ref 22–58)
ECHO LV ESV INDEX A2C: 13.7 ML/M2
ECHO LV ESV INDEX A4C: 10.1 ML/M2
ECHO LV ESV INDEX BP: 12.3 ML/M2
ECHO LV ESV TEICHHOLZ: 0.41 ML
ECHO LV INTERNAL DIMENSION DIASTOLIC: 5.41 CM (ref 4.2–5.9)
ECHO LV INTERNAL DIMENSION SYSTOLIC: 4.01 CM
ECHO LV IVSD: 1.03 CM (ref 0.6–1)
ECHO LV MASS 2D: 203.7 G (ref 88–224)
ECHO LV MASS INDEX 2D: 97.2 G/M2 (ref 49–115)
ECHO LV POSTERIOR WALL DIASTOLIC: 0.73 CM (ref 0.6–1)
ECHO LVOT DIAM: 1.96 CM
ECHO LVOT PEAK GRADIENT: 3 MMHG
ECHO LVOT PEAK VELOCITY: 86.2 CM/S
ECHO LVOT VTI: 20.1 CM
ECHO MV A VELOCITY: 110.85 CM/S
ECHO MV AREA PHT: 3.1 CM2
ECHO MV E DECELERATION TIME (DT): 243.6 MS
ECHO MV E VELOCITY: 95.48 CM/S
ECHO MV E/A RATIO: 0.86
ECHO MV E/E' LATERAL: 8.68
ECHO MV E/E' RATIO (AVERAGED): 11.16
ECHO MV E/E' SEPTAL: 13.64
ECHO MV PRESSURE HALF TIME (PHT): 70.6 MS
ECHO RA AREA 4C: 19.88 CM2
ECHO RV INTERNAL DIMENSION: 4.38 CM
ECHO TV REGURGITANT MAX VELOCITY: 160.31 CM/S
ECHO TV REGURGITANT PEAK GRADIENT: 10.3 MMHG
EOSINOPHIL # BLD: 0 K/UL (ref 0–0.4)
EOSINOPHIL NFR BLD: 0 % (ref 0–5)
ERYTHROCYTE [DISTWIDTH] IN BLOOD BY AUTOMATED COUNT: 13.8 % (ref 11.6–14.5)
GLOBULIN SER CALC-MCNC: 3.2 G/DL (ref 2–4)
GLUCOSE SERPL-MCNC: 139 MG/DL (ref 74–99)
HCT VFR BLD AUTO: 31.5 % (ref 36–48)
HGB BLD-MCNC: 10.4 G/DL (ref 13–16)
LVFS 2D: 25.96 %
LVOT MG: 1.65 MMHG
LVOT MV: 0.59 CM/S
LVSV (MOD BI): 23.16 ML
LVSV (MOD SINGLE 4C): 20.76 ML
LVSV (MOD SINGLE): 25.77 ML
LVSV (TEICH): 34.37 ML
LYMPHOCYTES # BLD: 0.5 K/UL (ref 0.9–3.6)
LYMPHOCYTES NFR BLD: 5 % (ref 21–52)
MCH RBC QN AUTO: 28.5 PG (ref 24–34)
MCHC RBC AUTO-ENTMCNC: 33 G/DL (ref 31–37)
MCV RBC AUTO: 86.3 FL (ref 74–97)
MONOCYTES # BLD: 0.4 K/UL (ref 0.05–1.2)
MONOCYTES NFR BLD: 4 % (ref 3–10)
MV DEC SLOPE: 3.92
NEUTS SEG # BLD: 8.8 K/UL (ref 1.8–8)
NEUTS SEG NFR BLD: 91 % (ref 40–73)
PLATELET # BLD AUTO: 342 K/UL (ref 135–420)
PMV BLD AUTO: 8.9 FL (ref 9.2–11.8)
POTASSIUM SERPL-SCNC: 3.6 MMOL/L (ref 3.5–5.5)
PROT SERPL-MCNC: 6.1 G/DL (ref 6.4–8.2)
RBC # BLD AUTO: 3.65 M/UL (ref 4.7–5.5)
SODIUM SERPL-SCNC: 133 MMOL/L (ref 136–145)
WBC # BLD AUTO: 9.7 K/UL (ref 4.6–13.2)

## 2020-06-15 PROCEDURE — 94760 N-INVAS EAR/PLS OXIMETRY 1: CPT

## 2020-06-15 PROCEDURE — 77010033678 HC OXYGEN DAILY

## 2020-06-15 PROCEDURE — 94640 AIRWAY INHALATION TREATMENT: CPT

## 2020-06-15 PROCEDURE — 74011250636 HC RX REV CODE- 250/636: Performed by: INTERNAL MEDICINE

## 2020-06-15 PROCEDURE — 65270000029 HC RM PRIVATE

## 2020-06-15 PROCEDURE — 92526 ORAL FUNCTION THERAPY: CPT

## 2020-06-15 PROCEDURE — 97535 SELF CARE MNGMENT TRAINING: CPT

## 2020-06-15 PROCEDURE — 97166 OT EVAL MOD COMPLEX 45 MIN: CPT

## 2020-06-15 PROCEDURE — 92611 MOTION FLUOROSCOPY/SWALLOW: CPT

## 2020-06-15 PROCEDURE — 74011250636 HC RX REV CODE- 250/636: Performed by: HOSPITALIST

## 2020-06-15 PROCEDURE — C8929 TTE W OR WO FOL WCON,DOPPLER: HCPCS

## 2020-06-15 PROCEDURE — 36415 COLL VENOUS BLD VENIPUNCTURE: CPT

## 2020-06-15 PROCEDURE — 74011000258 HC RX REV CODE- 258: Performed by: INTERNAL MEDICINE

## 2020-06-15 PROCEDURE — 97116 GAIT TRAINING THERAPY: CPT

## 2020-06-15 PROCEDURE — 74011000250 HC RX REV CODE- 250: Performed by: INTERNAL MEDICINE

## 2020-06-15 PROCEDURE — 80053 COMPREHEN METABOLIC PANEL: CPT

## 2020-06-15 PROCEDURE — 74011250637 HC RX REV CODE- 250/637: Performed by: INTERNAL MEDICINE

## 2020-06-15 PROCEDURE — 85025 COMPLETE CBC W/AUTO DIFF WBC: CPT

## 2020-06-15 PROCEDURE — 74230 X-RAY XM SWLNG FUNCJ C+: CPT

## 2020-06-15 PROCEDURE — 74011000255 HC RX REV CODE- 255: Performed by: INTERNAL MEDICINE

## 2020-06-15 PROCEDURE — 97110 THERAPEUTIC EXERCISES: CPT

## 2020-06-15 RX ORDER — ENOXAPARIN SODIUM 100 MG/ML
40 INJECTION SUBCUTANEOUS EVERY 24 HOURS
Status: DISCONTINUED | OUTPATIENT
Start: 2020-06-15 | End: 2020-06-17 | Stop reason: HOSPADM

## 2020-06-15 RX ADMIN — GUAIFENESIN 1200 MG: 600 TABLET, EXTENDED RELEASE ORAL at 12:56

## 2020-06-15 RX ADMIN — METHYLPREDNISOLONE SODIUM SUCCINATE 40 MG: 40 INJECTION, POWDER, FOR SOLUTION INTRAMUSCULAR; INTRAVENOUS at 16:59

## 2020-06-15 RX ADMIN — HYDROCORTISONE: 1 CREAM TOPICAL at 20:52

## 2020-06-15 RX ADMIN — ENOXAPARIN SODIUM 40 MG: 40 INJECTION SUBCUTANEOUS at 12:56

## 2020-06-15 RX ADMIN — BUDESONIDE 500 MCG: 0.25 INHALANT RESPIRATORY (INHALATION) at 07:27

## 2020-06-15 RX ADMIN — IPRATROPIUM BROMIDE AND ALBUTEROL SULFATE 3 ML: .5; 3 SOLUTION RESPIRATORY (INHALATION) at 00:23

## 2020-06-15 RX ADMIN — GUAIFENESIN 1200 MG: 600 TABLET, EXTENDED RELEASE ORAL at 20:52

## 2020-06-15 RX ADMIN — METHYLPREDNISOLONE SODIUM SUCCINATE 60 MG: 40 INJECTION, POWDER, FOR SOLUTION INTRAMUSCULAR; INTRAVENOUS at 06:55

## 2020-06-15 RX ADMIN — PERFLUTREN 1 ML: 6.52 INJECTION, SUSPENSION INTRAVENOUS at 10:11

## 2020-06-15 RX ADMIN — METHYLPREDNISOLONE SODIUM SUCCINATE 60 MG: 40 INJECTION, POWDER, FOR SOLUTION INTRAMUSCULAR; INTRAVENOUS at 01:39

## 2020-06-15 RX ADMIN — HYDROCORTISONE: 1 CREAM TOPICAL at 12:57

## 2020-06-15 RX ADMIN — PIPERACILLIN AND TAZOBACTAM 3.38 G: 3; .375 INJECTION, POWDER, LYOPHILIZED, FOR SOLUTION INTRAVENOUS at 05:10

## 2020-06-15 RX ADMIN — PIPERACILLIN AND TAZOBACTAM 3.38 G: 3; .375 INJECTION, POWDER, LYOPHILIZED, FOR SOLUTION INTRAVENOUS at 12:56

## 2020-06-15 RX ADMIN — IPRATROPIUM BROMIDE AND ALBUTEROL SULFATE 3 ML: .5; 3 SOLUTION RESPIRATORY (INHALATION) at 03:16

## 2020-06-15 RX ADMIN — BUDESONIDE 500 MCG: 0.25 INHALANT RESPIRATORY (INHALATION) at 21:06

## 2020-06-15 RX ADMIN — IPRATROPIUM BROMIDE AND ALBUTEROL SULFATE 3 ML: .5; 3 SOLUTION RESPIRATORY (INHALATION) at 14:58

## 2020-06-15 RX ADMIN — BARIUM SULFATE 20 ML: 400 SUSPENSION ORAL at 12:50

## 2020-06-15 RX ADMIN — PIPERACILLIN AND TAZOBACTAM 3.38 G: 3; .375 INJECTION, POWDER, LYOPHILIZED, FOR SOLUTION INTRAVENOUS at 17:00

## 2020-06-15 RX ADMIN — IPRATROPIUM BROMIDE AND ALBUTEROL SULFATE 3 ML: .5; 3 SOLUTION RESPIRATORY (INHALATION) at 21:06

## 2020-06-15 RX ADMIN — BARIUM SULFATE 20 G: 960 POWDER, FOR SUSPENSION ORAL at 12:50

## 2020-06-15 RX ADMIN — IPRATROPIUM BROMIDE AND ALBUTEROL SULFATE 3 ML: .5; 3 SOLUTION RESPIRATORY (INHALATION) at 23:53

## 2020-06-15 RX ADMIN — IPRATROPIUM BROMIDE AND ALBUTEROL SULFATE 3 ML: .5; 3 SOLUTION RESPIRATORY (INHALATION) at 07:26

## 2020-06-15 RX ADMIN — BARIUM SULFATE 30 ML: 400 PASTE ORAL at 12:50

## 2020-06-15 NOTE — PROGRESS NOTES
Bedside and Verbal shift change report given to TK Berg,RN and MARKOS Castellon RN (oncoming nurse) by Leona Felton, CAITLIN and Kalli Mcconnell RN (offgoing nurse). Report included the following information SBAR, Kardex, Intake/Output and MAR. A CAITLIN Castellon assuming care of this patient. 1931-Bedside and Verbal shift change report given to Sheela Newton (oncoming nurse) by Christen Berman and MARKOS Castellon RN (offgoing nurse). Report included the following information SBAR, Kardex, Intake/Output and MAR.

## 2020-06-15 NOTE — PROGRESS NOTES
Shift Summary :  No complaints this shift. Sleeping at intervals. NPO for most of night for xrays this am.    Bedside and Verbal shift change report given to CHRIS Berg RN/ PAUL Roger RN  (oncoming nurse) by PAUL Wright RN  (offgoing nurse). Report included the following information SBAR, Kardex, MAR and Recent Results.

## 2020-06-15 NOTE — PROGRESS NOTES
S: MD response in accordance to diet order in accordance to modified barium swallow procedure. B: Attending nightshift nursing staff indicated that the patient had a pending modified barium swallow study but still presented with a Cardiac Soft diet and no NPO order. At 0800 am, patient was witnessed to have consumed dietary tray. At  am, Radiology department was contacted and attempted to be notified that patient ate due to still being on diet and no NPO order. Hospitalist Dr. Meche Lopez will be notified as well. A: At 0820 am, Dr. Arevalo Heads contacted floor and informed nursing staff that patient could have diet in place to receive modified barium swallow procedure. Understanding was verbalized. R: Will continue with prescribed plan of care as directed.    Ron Castellon  6/15/2020  8:59 AM

## 2020-06-15 NOTE — PROGRESS NOTES
Problem: Mobility Impaired (Adult and Pediatric)  Goal: *Acute Goals and Plan of Care (Insert Text)  Description: Physical Therapy Goals  Initiated 6/14/2020 and to be accomplished within 3-7 day(s)  1. Patient will move from supine to sit and sit to supine  in bed with supervision/set-up. 2.  Patient will transfer from bed to chair and chair to bed with supervision/set-up using the least restrictive device. 3.  Patient will perform sit to stand with supervision/set-up. 4.  Patient will ambulate with supervision/set-up for 100 feet with the least restrictive device. 5.  Patient will ascend/descend 3 stairs with handrail(s) with minimal assistance/contact guard assist.   Outcome: Progressing Towards Goal   PHYSICAL THERAPY TREATMENT    Patient: Sedrick Trujillo (51 y.o. male)  Date: 6/15/2020  Diagnosis: Hyponatremia [E87.1]   COPD with acute exacerbation (HCC)       Precautions: Fall   Chart, physical therapy assessment, plan of care and goals were reviewed. ASSESSMENT:  Pt seen sitting at the EOB prior to session. Pt reports 7/10 pain in B/L LEs. Pt able to increase gait distance w/ RW/GB, no LOB noted. Pt able to transfer back to the bed where pt was able to perform therex activity w/ min VCs needed. Pt able to maintain O2 levels in the mid 90s  Pt left sitting at the EOB after session, call bell and tray in reach, nurse notified after session. Progression toward goals:  [x]      Improving appropriately and progressing toward goals  []      Improving slowly and progressing toward goals  []      Not making progress toward goals and plan of care will be adjusted     PLAN:  Patient continues to benefit from skilled intervention to address the above impairments. Continue treatment per established plan of care. Discharge Recommendations:  Home Health  Further Equipment Recommendations for Discharge:  N/A     SUBJECTIVE:   Patient stated I feel pretty good .     OBJECTIVE DATA SUMMARY:   Critical Behavior:  Neurologic State: Alert, Appropriate for age  Orientation Level: Oriented X4  Cognition: Appropriate decision making, Follows commands  Safety/Judgement: Decreased insight into deficits, Fall prevention, Home safety  Functional Mobility Training:  Bed Mobility:  Sit to Supine: Stand-by assistance  Scooting: Stand-by assistance  Transfers:  Sit to Stand: Stand-by assistance  Stand to Sit: Stand-by assistance  Balance:  Sitting: Intact  Standing: Intact; With support  Standing - Static: Good  Standing - Dynamic : Good;Fair  Ambulation/Gait Training:  Distance (ft): 60 Feet (ft)  Assistive Device: Gait belt;Walker, rolling  Ambulation - Level of Assistance: Contact guard assistance  Gait Abnormalities: Decreased step clearance  Base of Support: Widened  Speed/Lisbet: Shuffled; Slow  Step Length: Left shortened;Right shortened    Therapeutic Exercises:       EXERCISE   Sets   Reps   Active Active Assist   Passive Self ROM   Comments   Ankle Pumps    [] [] [] []    Quad Sets/Glut Sets   [] [] [] []    Hamstring Sets   [] [] [] []    Short Arc Quads   [] [] [] []    Heel Slides   [] [] [] []    Straight Leg Raises   [] [] [] []    Hip Abd/Add 2 10 [x] [] [] []    Long Arc Quads 2 10 [x] [] [] []    Seated Marching 2 10 [x] [] [] []    Standing Marching   [] [] [] []       [] [] [] []       Pain:  Pain Scale 1: Numeric (0 - 10)  Pain Intensity 1: 0  Activity Tolerance:   Fair  Please refer to the flowsheet for vital signs taken during this treatment.   After treatment:   [] Patient left in no apparent distress sitting up in chair  [x] Patient left in no apparent distress in bed (sitting at the EOB)  [x] Call bell left within reach  [x] Nursing notified  [] Caregiver present  [] Bed alarm activated      Alexis Prince PT   Time Calculation: 26 mins

## 2020-06-15 NOTE — PROGRESS NOTES
S: Radiology department notification that patient ate pending modified barium swallow procedure. B: Attending nightshift nursing staff indicated that the patient had a pending modified barium swallow study but still presented with a Cardiac Soft diet and no NPO order. At 0800 am, patient was witnessed to have consumed dietary tray. A: At 9830 am, Radiology department was contacted and attempted to be notified that patient ate due to still being on diet and no NPO order. Hospitalist Dr. Jay Coates will be notified as well. R: will continue with prescribed plan of care as directed.    Ron Castellon  6/15/2020   8:30 am

## 2020-06-15 NOTE — PROGRESS NOTES
D/C Plan: [facility] with physician follow up vs [place] and physician follow up    Provider has indicated pt will likely transition home within the next 24-48 hours. CM contacted pt to discuss transition of care options to include HH. Pt has indicated \" I don't think I need that\". CM notified pt that CM will reach out to him again before he is discharged to further discuss [place] upon discharge. Pt verbalized an understanding. CM to continue to follow and assist.    Care Management Interventions  PCP Verified by CM: Yes  Mode of Transport at Discharge:  Other (see comment)(family)  Transition of Care Consult (CM Consult): Discharge Planning  Health Maintenance Reviewed: Yes  Physical Therapy Consult: Yes  Occupational Therapy Consult: Yes  Speech Therapy Consult: Yes  Current Support Network: Lives with Spouse  Confirm Follow Up Transport: Family  The Plan for Transition of Care is Related to the Following Treatment Goals : Home with physician follow up vs [place] and physician follow up   Discharge Location  Discharge Placement: Home with family assistance(vs HH)

## 2020-06-15 NOTE — PROGRESS NOTES
S: Patient left unit for Modified Barium Swallow study. B: At 1040 am, Medical transport presented to bedside to take patient via stretcher to radiology department to have modified barium swallow study performed. Understanding was verbalized. A: At 1100 am, Medical transport took patient safely off unit to Modified barium Swallow study by bed and on 2 liters of oxygen via nasal cannula to maintain oxygenation. Patient verbalized understanding and presented alert, awake and stable upon leaving unit to study. R: Will continue with prescribed plan of care as directed.    Melissa ADEN, RN   6/15/2020  11:02 AM

## 2020-06-15 NOTE — PROGRESS NOTES
Patient presents compliant to current plan of care as directed.    Belle KIMBLEN, RN   6/15/2020  12:53 PM

## 2020-06-15 NOTE — PROGRESS NOTES
Hospitalist Progress Note    Patient: Keven Wells MRN: [id number]  CSN: [id number]    YOB: 1945  Age: 66 y.o. Sex: male    DOA: 6/13/2020 LOS:  LOS: 2 days          Chief Complaint:    COPD      Assessment/Plan     Acute COPD exacerbation-CXR clear, wheezing subsiding, wean tseroids, nebs PRN, chronic 02 dependence-at baseline-PT again today to test strength    Hyponatremia-improved off diuretic  Paroxysmal A fib-stable rate, apparently off eliquis  Asbestosis  Hypertension-stable  CKD stage 3    Echo result pending  MBS for today  Continue empiric IV abx for bronchitis  Wean tseroids  DVT prev-lovenox    PT    Expect 1-2 days more inpatient          Disposition :HHN  Patient Active Problem List   Diagnosis Code    Hypertension I10    COPD (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 (HCC) J44.9    Chronic renal disease, stage 3, moderately decreased glomerular filtration rate between 30-59 mL/min/1.73 square meter (HCC) N18.3    Asbestosis ([address] J61    Acute exacerbation of chronic obstructive pulmonary disease (COPD) ([address] J44.1    Debility R53.81    Paroxysmal atrial fibrillation (HCC) I48.0    Chronic respiratory failure with hypoxia ([address] J96.11    Tobacco dependence F17.200    Hyponatremia E87.1    Pleural effusion, right J90    COPD exacerbation ([address] J44.1    Acute respiratory failure with hypoxia and hypercarbia (HCC) J96.01, J96.02    Hyponatremia E87.1    Advanced care planning/counseling discussion Z71.89    Hypokalemia E87.6       Subjective:    I feel much better  I am getting there!     Review of systems:    Constitutional: denies fevers, chills, myalgias  Respiratory: denies SOB, has a mild cough, still wheezing  Cardiovascular: denies chest pain, palpitations  Gastrointestinal: denies nausea, vomiting, diarrhea      Vital signs/Intake and Output:  Visit Vitals  /75   Pulse 82   Temp 98.9 °F (37.2 °C)   Resp 19   Ht 5' 8\" (1.727 m)   Wt 96.2 kg (212 lb)   SpO2 98%   BMI 32.23 kg/m²     Current Shift:  06/15 0701 - 06/15 1900  In: 360 [P.O.:360]  Out: -   Last three shifts:  06/13 1901 - 06/15 0700  In: 1189.3 [P.O.:682; I.V.:507.3]  Out: 3450 [Urine:3450]    Exam:    General: elderly WM alert, NAD, OX3  Head/Neck: NCAT, supple, No masses, No lymphadenopathy  CVS:Regular rate and rhythm, no M/R/G, S1/S2 heard, no thrill  Lungs:Coarse BS, BL wheezes, decent air exchange, no retractions  Abdomen: Soft, Nontender, No distention, Normal Bowel sounds, No hepatomegaly  Extremities: No C/C/E, pulses palpable 2+  Skin:normal texture and turgor, no rashes, no lesions  Neuro:grossly normal , follows commands  Psych:appropriate                Labs: Results:       Chemistry Recent Labs     06/15/20  0458 06/13/20  1105   * 104*   * 127*   K 3.6 3.8   CL 97* 93*   CO2 27 25   BUN 28* 12   CREA 1.26 1.09   CA 8.2* 8.7   AGAP 9 9   BUCR 22* 11*   AP 74 107   TP 6.1* 6.7   ALB 2.9* 3.2*   GLOB 3.2 3.5   AGRAT 0.9 0.9      CBC w/Diff Recent Labs     06/15/20  0458 06/13/20  1105   WBC 9.7 11.9   RBC 3.65* 3.79*   HGB 10.4* 10.9*   HCT 31.5* 32.4*    365   GRANS 91* 80*   LYMPH 5* 9*   EOS 0 3      Cardiac Enzymes Recent Labs     06/13/20  1105      CKND1 3.2      Coagulation No results for input(s): PTP, INR, APTT, INREXT, INREXT in the last 72 hours. Lipid Panel No results found for: CHOL, CHOLPOCT, CHOLX, CHLST, CHOLV, 996152, HDL, HDLP, LDL, LDLC, DLDLP, 498699, VLDLC, VLDL, TGLX, TRIGL, TRIGP, TGLPOCT, CHHD, CHHDX   BNP No results for input(s): BNPP in the last 72 hours.    Liver Enzymes Recent Labs     06/15/20  0458   TP 6.1*   ALB 2.9*   AP 74      Thyroid Studies Lab Results   Component Value Date/Time    TSH 0.86 10/20/2019 01:05 AM        Procedures/imaging: see electronic medical records for all procedures/Xrays and details which were not copied into this note but were reviewed prior to creation of Aziza Porter MD

## 2020-06-15 NOTE — PROGRESS NOTES
Problem: Dysphagia (Adult)  Goal: *Acute Goals and Plan of Care (Insert Text)  Description: Dysphagia Present: mild oral  Aspiration: no overt s/sx aspiration    Recommendations:  Diet: dental soft solids/ thin  Meds: as tolerated  Aspiration Precautions  Oral Care TID    Goals:  Patient will:  1. Tolerate PO trials with no overt s/s aspiration or distress in 4/5 trials  2. Utilize compensatory swallow strategies/maneuvers (decrease bite/sip, size/rate, alt. liq/sol) with min cues in 4/5 trials  3. Perform oral-motor/laryngeal strengthening exercises with min cues to increase oropharyngeal swallow function   4. Complete an objective swallow study (i.e., MBSS) to assess swallow integrity, r/o aspiration, and determine of safest LRD, min A (goal met - 6.15.20). Outcome: Progressing Towards Goal  SPEECH PATHOLOGY MODIFIED BARIUM SWALLOW STUDY & TREATMENT    Patient: Yann Stewart (28 y.o. male)  Date: 6/15/2020  Primary Diagnosis: Hyponatremia [E87.1]  Precautions: Aspiration Fall  PLOF: Regular diet per report    ASSESSMENT :  Based on the objective data described below, the patient presents with mild oral dysphagia c/b mildly delayed mastication of solids with otherwise adequate bolus cohesion, manipulation and A-P transit. Further exhibited adequate swallow timing/reflex and hyolaryngeal excursion. Able to manipulate and clear with 0 clinical s/s aspiration. Treatment:  Skilled therapy initiated: Educated pt on MBS results, aspiration precautions, and importance of compensatory swallow techniques to decrease aspiration risk (decrease rate of intake & sip/bite size, upright @HOB for all po intake and ~30 minutes after po); verbalized comprehension. SLP to follow as indicated. Patient will benefit from skilled intervention to address the above impairments.   Patient's rehabilitation potential is considered to be Good  Factors which may influence rehabilitation potential include:   []              None noted  [x]              Mental ability/status  [x]              Medical condition  [x]              Home/family situation and support systems  [x]              Safety awareness  []              Pain tolerance/management  []              Other:      PLAN :  Recommendations and Planned Interventions:  ST followup x1-2  Frequency/Duration: Patient will be followed by speech-language pathology 3 times a week to address goals. Discharge Recommendations: To Be Determined     SUBJECTIVE:   Patient stated I've had some trouble before, but right now I'm doing good.     OBJECTIVE:     Past Medical History:   Diagnosis Date    Brain aneurysm     Cancer ([address]     prostate    COPD (chronic obstructive pulmonary disease) (HCC)     Hypertension     Nocturia     Pneumonia     Radiation effect      Past Surgical History:   Procedure Laterality Date    HX HERNIA REPAIR       Home Situation:   Home Situation  Home Environment: Trailer/mobile home  # Steps to Enter: 6  Rails to Enter: Yes  Hand Rails : Bilateral  One/Two Story Residence: One story  Living Alone: No  Support Systems: Friends \ neighbors, Child(prashant)  Patient Expects to be Discharged to[de-identified] Trailer/mobile home  Current DME Used/Available at Home: Cane, quad, Oxygen, portable, Walker, rolling  Tub or Shower Type: Tub/Shower combination  Diet prior to admission: Regular  Current Diet:  Dental soft  Film Views: Lateral  Patient Position: (Upright in MBS chair)    Trial 1:   Consistency Presented: Mechanical soft;Mixed consistency; Nectar thick liquid;Puree; Solid; Thin liquid   How Presented: Self-fed/presented;SLP-fed/presented;Spoon;Straw;Successive swallows       Bolus Acceptance: No impairment   Bolus Formation/Control: Impaired: Delayed(with solid textures)       Oral Residue: None   Initiation of Swallow: Triggered at base of tongue   Timing: No impairment   Penetration: None   Aspiration/Timing: No evidence of aspiration   Pharyngeal Clearance: No residue   Attempted Modifications: Alternate liquids/solids;Small sips and bites; Spoon;Straw   Effective Modifications: Alternate liquids/solids;Straw;Spoon;Small sips and bites   Cues for Modifications: Minimal         Decreased Tongue Base Retraction?: No  Laryngeal Elevation: WFL (within functional limits)  Aspiration/Penetration Score: 1 (No penetration or aspiration-Contrast does not enter the airway)  Pharyngeal Symmetry: Not assessed  Pharyngeal-Esophageal Segment: No impairment  Pharyngeal Dysfunction: None    Oral Phase Severity: Mild  Pharyngeal Phase Severity: N/A    8-point Penetration-Aspiration Scale: Score 1  PAIN:  Pain level pre-treatment: 0/10   Pain level post-treatment: 0/10     COMMUNICATION/EDUCATION:   [x]  Patient educated regarding MBS results and diet recommendations. [x]  Patient/family have participated as able in goal setting and plan of care. []  Patient/family agree to work toward stated goals and plan of care. []  Patient understands intent and goals of therapy, but is neutral about his/her participation. []  Patient is unable to participate in goal setting and plan of care.     Thank you for this referral.  Joaquina Calle, SLP  Time Calculation: 25 mins  MBS Time: 15 minutes  Treatment Time: 10 minutes

## 2020-06-15 NOTE — PROGRESS NOTES
Bedside  shift change report given to  MEGHNA Arevalo RN  (oncoming nurse) by Zahra ADEN, RN (offgoing nurse). Report included the following information SBAR, Kardex and MAR. SHIFT UPDATES:  Patient had Modified Barium Swallow performed and presented with no abnormalities with thin liquids or Mechanical soft diet. Patient remains on Cardiac Soft diet. ABNORMAL LABS:          ECHO RESULTS:      · Mildly dilated left atrium. · Mildly dilated right ventricle. · Mildly dilated right atrium. · Probably trileaflet aortic valve. · Mitral valve non-specific thickening. Mild mitral annular calcification. Trace mitral valve regurgitation is present. · Pulmonary arterial systolic pressure is 25 mmHg. Pulmonary hypertension not suggested by Doppler findings. · Severely elevated central venous pressure (15+ mmHg); IVC diameter is larger than 21 mm and collapses less than 50% with respiration. Results for Aly Elkins (MRN [id number]) as of 6/15/2020 19:15   Ref. Range 6/15/2020 04:58   WBC Latest Ref Range: 4.6 - 13.2 K/uL 9.7   RBC Latest Ref Range: 4.70 - 5.50 M/uL 3.65 (L)   HGB Latest Ref Range: 13.0 - 16.0 g/dL 10.4 (L)   HCT Latest Ref Range: 36.0 - 48.0 % 31.5 (L)   MCV Latest Ref Range: 74.0 - 97.0 FL 86.3   MCH Latest Ref Range: 24.0 - 34.0 PG 28.5   MCHC Latest Ref Range: 31.0 - 37.0 g/dL 33.0   RDW Latest Ref Range: 11.6 - 14.5 % 13.8   PLATELET Latest Ref Range: 135 - 420 K/uL 342   MPV Latest Ref Range: 9.2 - 11.8 FL 8.9 (L)   NEUTROPHILS Latest Ref Range: 40 - 73 % 91 (H)   LYMPHOCYTES Latest Ref Range: 21 - 52 % 5 (L)   MONOCYTES Latest Ref Range: 3 - 10 % 4   EOSINOPHILS Latest Ref Range: 0 - 5 % 0   BASOPHILS Latest Ref Range: 0 - 2 % 0   DF Latest Units:   AUTOMATED   ABS. NEUTROPHILS Latest Ref Range: 1.8 - 8.0 K/UL 8.8 (H)   ABS. LYMPHOCYTES Latest Ref Range: 0.9 - 3.6 K/UL 0.5 (L)   ABS. MONOCYTES Latest Ref Range: 0.05 - 1.2 K/UL 0.4   ABS.  EOSINOPHILS Latest Ref Range: 0.0 - 0.4 K/UL 0.0   ABS. BASOPHILS Latest Ref Range: 0.0 - 0.1 K/UL 0.0   Sodium Latest Ref Range: 136 - 145 mmol/L 133 (L)   Potassium Latest Ref Range: 3.5 - 5.5 mmol/L 3.6   Chloride Latest Ref Range: 100 - 111 mmol/L 97 (L)   CO2 Latest Ref Range: 21 - 32 mmol/L 27   Anion gap Latest Ref Range: 3.0 - 18 mmol/L 9   Glucose Latest Ref Range: 74 - 99 mg/dL 139 (H)   BUN Latest Ref Range: 7.0 - 18 MG/DL 28 (H)   Creatinine Latest Ref Range: 0.6 - 1.3 MG/DL 1.26   BUN/Creatinine ratio Latest Ref Range: 12 - 20   22 (H)   Calcium Latest Ref Range: 8.5 - 10.1 MG/DL 8.2 (L)   GFR est non-AA Latest Ref Range: >60 ml/min/1.73m2 56 (L)   GFR est AA Latest Ref Range: >60 ml/min/1.73m2 >60   Bilirubin, total Latest Ref Range: 0.2 - 1.0 MG/DL 0.3   Protein, total Latest Ref Range: 6.4 - 8.2 g/dL 6.1 (L)   Albumin Latest Ref Range: 3.4 - 5.0 g/dL 2.9 (L)   Globulin Latest Ref Range: 2.0 - 4.0 g/dL 3.2   A-G Ratio Latest Ref Range: 0.8 - 1.7   0.9   ALT Latest Ref Range: 16 - 61 U/L 14 (L)   AST Latest Ref Range: 10 - 38 U/L 11   Alk. phosphatase Latest Ref Range: 45 - 117 U/L 74       Wounds? None    Central Lines? None      Last BM:  06/15/2020 (As stated by patient at end of shift)       Pending Labs for AM Draw: None      Discharge plan:  As of 06/15/2020 pending discharge home with health in next 24-48 hours.      Ron Castellon  6/15/2020   7:36 PM

## 2020-06-15 NOTE — PROGRESS NOTES
Problem: Self Care Deficits Care Plan (Adult)  Goal: *Acute Goals and Plan of Care (Insert Text)  Description: Occupational Therapy Goals  Initiated 6/15/2020 within 7 day(s). 1.  Patient will perform grooming with supervision/set-up   2. Patient will perform bathing with supervision/set-up. 3.  Patient will perform lower body dressing with supervision/set-up. 4.  Patient will perform toilet transfers with supervision/set-up. 5.  Patient will perform all aspects of toileting with supervision/set-up. 6.  Patient will participate in upper extremity therapeutic exercise/activities with supervision/set-up for 5 minutes. 7.  Patient will utilize energy conservation techniques during functional activities with verbal cues. Prior Level of Function: Mod Independent with ADL's and transfers using a RW or 4 pt cane     Outcome: Progressing Towards Goal   OCCUPATIONAL THERAPY EVALUATION    Patient: Luz Mcrae (17 y.o. male)  Date: 6/15/2020  Primary Diagnosis: Hyponatremia [E87.1]        Precautions:  Fall  PLOF: Per pt, he was Mod Independent with ADL's and transfers using a RW or quad cane. Pt drives. He enjoys watching TV and reading for fun. ASSESSMENT :  Based on the objective data described below, the patient presents with decreased strength, balance, safety, and activity tolerance which is limiting his independence with ADL's and transfers. Pt received sitting on EOB. Pt required total A to don socks seated on EOB. Pt reported that he got a sock aid at his previous hospitalization but he doesn't use it because its too hard to get socks on/doff. Pt educated on the need to wear non skid footwear at home to decrease risk of falling. Pt verbalized understanding. Pt did sit to stand from EOB with CGA and cues for hand placement/safety. Pt ambulated to/from BR with RW and CGA for balance/safety. Pt did toilet transfer with CGA using grab bar on R side. Pt required rest breaks during tasks due to SOB. Pt required vc's for PLB technique. Pt ambulated back to bed and donned clean gown with Min A seated on EOB. Pt required total A to doff socks. Pt requested to lay down in bed. Pt did sit to supine with SBA. Pt left resting in bed with call light in reach and all needs met. Education: role of OT, OT POC and goals, safety with OOB activity, RW use/safety    Patient will benefit from skilled intervention to address the above impairments. Patient's rehabilitation potential is considered to be Good  Factors which may influence rehabilitation potential include:   []             None noted  []             Mental ability/status  [x]             Medical condition  []             Home/family situation and support systems  [x]             Safety awareness  []             Pain tolerance/management  []             Other:      PLAN :  Recommendations and Planned Interventions:   [x]               Self Care Training                  [x]      Therapeutic Activities  [x]               Functional Mobility Training   []      Cognitive Retraining  [x]               Therapeutic Exercises           [x]      Endurance Activities  [x]               Balance Training                    []      Neuromuscular Re-Education  []               Visual/Perceptual Training     [x]      Home Safety Training  [x]               Patient Education                   [x]      Family Training/Education  []               Other (comment):    Frequency/Duration: Patient will be followed by occupational therapy 1-2 times per day/4-7 days per week to address goals. Discharge Recommendations: Home Health  Further Equipment Recommendations for Discharge: N/A     SUBJECTIVE:   Patient stated i'm just waiting to die. COPD is a killer.     OBJECTIVE DATA SUMMARY:     Past Medical History:   Diagnosis Date    Brain aneurysm     Cancer [facility])     prostate    COPD (chronic obstructive pulmonary disease) (HCC)     Hypertension     Nocturia     Pneumonia Radiation effect      Past Surgical History:   Procedure Laterality Date    HX HERNIA REPAIR       Barriers to Learning/Limitations: None  Compensate with: visual, verbal, tactile, kinesthetic cues/model    Home Situation:   Home Situation  Home Environment: Trailer/mobile home  # Steps to Enter: 6  Rails to Enter: Yes  Hand Rails : Bilateral  One/Two Story Residence: One story  Living Alone: No  Support Systems: Friends \ neighbors, Child(prashant)  Patient Expects to be Discharged to[de-identified] Trailer/mobile home  Current DME Used/Available at Home: Cane, quad, Oxygen, portable, Walker, rolling  Tub or Shower Type: Tub/Shower combination  [x]  Right hand dominant   []  Left hand dominant    Cognitive/Behavioral Status:  Neurologic State: Alert; Appropriate for age  Orientation Level: Oriented X4  Cognition: Appropriate decision making; Follows commands  Safety/Judgement: Awareness of environment; Fall prevention    Vision/Perceptual:     Acuity: Within Defined Limits    Corrective Lenses: Reading glasses    Coordination: BUE  Coordination: Within functional limits  Fine Motor Skills-Upper: Left Intact; Right Intact    Gross Motor Skills-Upper: Left Intact; Right Intact    Balance:  Sitting: Intact  Standing: Intact; With support  Standing - Static: Good  Standing - Dynamic : Fair;Good    Strength: BUE  Strength: Generally decreased, functional     Tone & Sensation: BUE  Tone: Normal  Sensation: Intact     Range of Motion: BUE  AROM: Generally decreased, functional     Functional Mobility and Transfers for ADLs:  Bed Mobility:  Sit to Supine: Stand-by assistance  Scooting: Stand-by assistance  Transfers:  Sit to Stand: Contact guard assistance   Toilet Transfer : Contact guard assistance   Bathroom Mobility: Contact guard assistance    ADL Assessment:   Feeding: Setup  Oral Facial Hygiene/Grooming: Setup  Bathing: Minimum assistance  Upper Body Dressing: Minimum assistance  Lower Body Dressing:  Total assistance  Toileting: Minimum assistance     ADL Intervention:   Upper Body Dressing Assistance  Dressing Assistance: Minimum assistance  Hospital Gown: Minimum  assistance    Lower Body Dressing Assistance  Dressing Assistance: Total assistance(dependent)  Socks: Total assistance (dependent)(pt has a sock aid at home already from previous admission)  Position Performed: Seated edge of bed    Cognitive Retraining  Safety/Judgement: Awareness of environment; Fall prevention    Pain:  Pain level pre-treatment: 7/10 - LLE  Pain level post-treatment: /10   Pain Intervention(s): Medication provided by Nursing (see MAR); Rest, Ice, Repositioning  Response to intervention: Nurse notified, See doc flow sheet    Activity Tolerance:   Poor to fair. Patient able to stand 1 minute(s). Patient able to complete ADLs with rest breaks. Patient limited by SOB. Patient unsteady. Please refer to the flowsheet for vital signs taken during this treatment. After treatment:   [] Patient left in no apparent distress sitting up in chair  [x] Patient left in no apparent distress in bed  [x] Call bell left within reach  [x] Nursing notified  [] Caregiver present  [] Bed alarm activated    COMMUNICATION/EDUCATION:   [x] Role of Occupational Therapy in the acute care setting  [x] Home safety education was provided and the patient/caregiver indicated understanding. [x] Patient/family have participated as able in goal setting and plan of care. [x] Patient/family agree to work toward stated goals and plan of care. [] Patient understands intent and goals of therapy, but is neutral about his/her participation. [] Patient is unable to participate in goal setting and plan of care. Thank you for this referral.   Luis M Tillman OTR/L MOT  Time Calculation: 23 mins    Eval Complexity: History: MEDIUM Complexity : Expanded review of history including physical, cognitive and psychosocial  history ;    Examination: MEDIUM Complexity : 3-5 performance deficits relating to physical, cognitive , or psychosocial skils that result in activity limitations and / or participation restrictions; Decision Making:MEDIUM Complexity : Patient may present with comorbidities that affect occupational performnce.  Miniml to moderate modification of tasks or assistance (eg, physical or verbal ) with assesment(s) is necessary to enable patient to complete evaluation

## 2020-06-16 LAB
ALBUMIN SERPL-MCNC: 2.7 G/DL (ref 3.4–5)
ALBUMIN/GLOB SERPL: 0.9 {RATIO} (ref 0.8–1.7)
ALP SERPL-CCNC: 65 U/L (ref 45–117)
ALT SERPL-CCNC: 10 U/L (ref 16–61)
ANION GAP SERPL CALC-SCNC: 9 MMOL/L (ref 3–18)
AST SERPL-CCNC: 11 U/L (ref 10–38)
BASOPHILS # BLD: 0 K/UL (ref 0–0.1)
BASOPHILS NFR BLD: 0 % (ref 0–2)
BILIRUB SERPL-MCNC: 0.3 MG/DL (ref 0.2–1)
BUN SERPL-MCNC: 34 MG/DL (ref 7–18)
BUN/CREAT SERPL: 26 (ref 12–20)
CALCIUM SERPL-MCNC: 7.6 MG/DL (ref 8.5–10.1)
CHLORIDE SERPL-SCNC: 98 MMOL/L (ref 100–111)
CO2 SERPL-SCNC: 28 MMOL/L (ref 21–32)
CREAT SERPL-MCNC: 1.33 MG/DL (ref 0.6–1.3)
DIFFERENTIAL METHOD BLD: ABNORMAL
EOSINOPHIL # BLD: 0 K/UL (ref 0–0.4)
EOSINOPHIL NFR BLD: 0 % (ref 0–5)
ERYTHROCYTE [DISTWIDTH] IN BLOOD BY AUTOMATED COUNT: 13.7 % (ref 11.6–14.5)
GLOBULIN SER CALC-MCNC: 2.9 G/DL (ref 2–4)
GLUCOSE SERPL-MCNC: 132 MG/DL (ref 74–99)
HCT VFR BLD AUTO: 31 % (ref 36–48)
HGB BLD-MCNC: 10 G/DL (ref 13–16)
LYMPHOCYTES # BLD: 0.5 K/UL (ref 0.9–3.6)
LYMPHOCYTES NFR BLD: 6 % (ref 21–52)
MCH RBC QN AUTO: 28.5 PG (ref 24–34)
MCHC RBC AUTO-ENTMCNC: 32.3 G/DL (ref 31–37)
MCV RBC AUTO: 88.3 FL (ref 74–97)
MONOCYTES # BLD: 0.7 K/UL (ref 0.05–1.2)
MONOCYTES NFR BLD: 9 % (ref 3–10)
NEUTS SEG # BLD: 6.9 K/UL (ref 1.8–8)
NEUTS SEG NFR BLD: 85 % (ref 40–73)
PLATELET # BLD AUTO: 326 K/UL (ref 135–420)
PMV BLD AUTO: 8.8 FL (ref 9.2–11.8)
POTASSIUM SERPL-SCNC: 3.3 MMOL/L (ref 3.5–5.5)
PROT SERPL-MCNC: 5.6 G/DL (ref 6.4–8.2)
RBC # BLD AUTO: 3.51 M/UL (ref 4.7–5.5)
SODIUM SERPL-SCNC: 135 MMOL/L (ref 136–145)
WBC # BLD AUTO: 8.2 K/UL (ref 4.6–13.2)

## 2020-06-16 PROCEDURE — 36415 COLL VENOUS BLD VENIPUNCTURE: CPT

## 2020-06-16 PROCEDURE — 74011250637 HC RX REV CODE- 250/637: Performed by: HOSPITALIST

## 2020-06-16 PROCEDURE — 94760 N-INVAS EAR/PLS OXIMETRY 1: CPT

## 2020-06-16 PROCEDURE — 74011000250 HC RX REV CODE- 250: Performed by: INTERNAL MEDICINE

## 2020-06-16 PROCEDURE — 80053 COMPREHEN METABOLIC PANEL: CPT

## 2020-06-16 PROCEDURE — 77010033678 HC OXYGEN DAILY

## 2020-06-16 PROCEDURE — 65270000029 HC RM PRIVATE

## 2020-06-16 PROCEDURE — 94640 AIRWAY INHALATION TREATMENT: CPT

## 2020-06-16 PROCEDURE — 92526 ORAL FUNCTION THERAPY: CPT

## 2020-06-16 PROCEDURE — 97116 GAIT TRAINING THERAPY: CPT

## 2020-06-16 PROCEDURE — 97535 SELF CARE MNGMENT TRAINING: CPT

## 2020-06-16 PROCEDURE — 85025 COMPLETE CBC W/AUTO DIFF WBC: CPT

## 2020-06-16 PROCEDURE — 74011250636 HC RX REV CODE- 250/636: Performed by: HOSPITALIST

## 2020-06-16 PROCEDURE — 74011250637 HC RX REV CODE- 250/637: Performed by: INTERNAL MEDICINE

## 2020-06-16 PROCEDURE — 74011250636 HC RX REV CODE- 250/636: Performed by: INTERNAL MEDICINE

## 2020-06-16 PROCEDURE — 74011000258 HC RX REV CODE- 258: Performed by: INTERNAL MEDICINE

## 2020-06-16 PROCEDURE — 97530 THERAPEUTIC ACTIVITIES: CPT

## 2020-06-16 RX ORDER — TAMSULOSIN HYDROCHLORIDE 0.4 MG/1
0.4 CAPSULE ORAL DAILY
Status: DISCONTINUED | OUTPATIENT
Start: 2020-06-16 | End: 2020-06-17 | Stop reason: HOSPADM

## 2020-06-16 RX ORDER — POTASSIUM CHLORIDE 20 MEQ/1
40 TABLET, EXTENDED RELEASE ORAL ONCE
Status: COMPLETED | OUTPATIENT
Start: 2020-06-17 | End: 2020-06-17

## 2020-06-16 RX ORDER — DILTIAZEM HYDROCHLORIDE 30 MG/1
30 TABLET, FILM COATED ORAL
Status: DISCONTINUED | OUTPATIENT
Start: 2020-06-16 | End: 2020-06-17 | Stop reason: HOSPADM

## 2020-06-16 RX ORDER — POTASSIUM CHLORIDE 20 MEQ/1
20 TABLET, EXTENDED RELEASE ORAL
Status: COMPLETED | OUTPATIENT
Start: 2020-06-16 | End: 2020-06-16

## 2020-06-16 RX ADMIN — HYDROCORTISONE: 1 CREAM TOPICAL at 11:43

## 2020-06-16 RX ADMIN — IPRATROPIUM BROMIDE AND ALBUTEROL SULFATE 3 ML: .5; 3 SOLUTION RESPIRATORY (INHALATION) at 08:12

## 2020-06-16 RX ADMIN — HYDROCORTISONE: 1 CREAM TOPICAL at 21:43

## 2020-06-16 RX ADMIN — DILTIAZEM HYDROCHLORIDE 30 MG: 30 TABLET, FILM COATED ORAL at 16:59

## 2020-06-16 RX ADMIN — BUDESONIDE 500 MCG: 0.25 INHALANT RESPIRATORY (INHALATION) at 08:12

## 2020-06-16 RX ADMIN — GUAIFENESIN 1200 MG: 600 TABLET, EXTENDED RELEASE ORAL at 21:43

## 2020-06-16 RX ADMIN — METHYLPREDNISOLONE SODIUM SUCCINATE 40 MG: 40 INJECTION, POWDER, FOR SOLUTION INTRAMUSCULAR; INTRAVENOUS at 15:10

## 2020-06-16 RX ADMIN — POTASSIUM CHLORIDE 20 MEQ: 1500 TABLET, EXTENDED RELEASE ORAL at 15:10

## 2020-06-16 RX ADMIN — METHYLPREDNISOLONE SODIUM SUCCINATE 40 MG: 40 INJECTION, POWDER, FOR SOLUTION INTRAMUSCULAR; INTRAVENOUS at 06:41

## 2020-06-16 RX ADMIN — METHYLPREDNISOLONE SODIUM SUCCINATE 40 MG: 40 INJECTION, POWDER, FOR SOLUTION INTRAMUSCULAR; INTRAVENOUS at 00:49

## 2020-06-16 RX ADMIN — PIPERACILLIN AND TAZOBACTAM 3.38 G: 3; .375 INJECTION, POWDER, LYOPHILIZED, FOR SOLUTION INTRAVENOUS at 21:43

## 2020-06-16 RX ADMIN — PIPERACILLIN AND TAZOBACTAM 3.38 G: 3; .375 INJECTION, POWDER, LYOPHILIZED, FOR SOLUTION INTRAVENOUS at 06:40

## 2020-06-16 RX ADMIN — IPRATROPIUM BROMIDE AND ALBUTEROL SULFATE 3 ML: .5; 3 SOLUTION RESPIRATORY (INHALATION) at 02:51

## 2020-06-16 RX ADMIN — BUDESONIDE 500 MCG: 0.25 INHALANT RESPIRATORY (INHALATION) at 20:16

## 2020-06-16 RX ADMIN — IPRATROPIUM BROMIDE AND ALBUTEROL SULFATE 3 ML: .5; 3 SOLUTION RESPIRATORY (INHALATION) at 11:45

## 2020-06-16 RX ADMIN — IPRATROPIUM BROMIDE AND ALBUTEROL SULFATE 3 ML: .5; 3 SOLUTION RESPIRATORY (INHALATION) at 15:23

## 2020-06-16 RX ADMIN — PIPERACILLIN AND TAZOBACTAM 3.38 G: 3; .375 INJECTION, POWDER, LYOPHILIZED, FOR SOLUTION INTRAVENOUS at 11:42

## 2020-06-16 RX ADMIN — PIPERACILLIN AND TAZOBACTAM 3.38 G: 3; .375 INJECTION, POWDER, LYOPHILIZED, FOR SOLUTION INTRAVENOUS at 15:11

## 2020-06-16 RX ADMIN — GUAIFENESIN 1200 MG: 600 TABLET, EXTENDED RELEASE ORAL at 11:43

## 2020-06-16 RX ADMIN — IPRATROPIUM BROMIDE AND ALBUTEROL SULFATE 3 ML: .5; 3 SOLUTION RESPIRATORY (INHALATION) at 20:16

## 2020-06-16 RX ADMIN — TAMSULOSIN HYDROCHLORIDE 0.4 MG: 0.4 CAPSULE ORAL at 15:10

## 2020-06-16 RX ADMIN — METHYLPREDNISOLONE SODIUM SUCCINATE 40 MG: 40 INJECTION, POWDER, FOR SOLUTION INTRAMUSCULAR; INTRAVENOUS at 23:02

## 2020-06-16 RX ADMIN — ENOXAPARIN SODIUM 40 MG: 40 INJECTION SUBCUTANEOUS at 11:42

## 2020-06-16 RX ADMIN — PIPERACILLIN AND TAZOBACTAM 3.38 G: 3; .375 INJECTION, POWDER, LYOPHILIZED, FOR SOLUTION INTRAVENOUS at 00:49

## 2020-06-16 NOTE — PROGRESS NOTES
Shift summary -- Pt remained clear on 2 lpm nasal cannula, but dyspnea with labored breathing observed. Scheduled neb tx given by RT; pt verbalized improvement post tx. HOB encouraged to remain elevated to improve respiratory efforts. Bedside and Verbal shift change report given to GLENN Steele RN (oncoming nurse) by Tyra Angelo RN (offgoing nurse). Report included the following information SBAR, Kardex, Intake/Output, MAR and Recent Results.

## 2020-06-16 NOTE — PROGRESS NOTES
Problem: Self Care Deficits Care Plan (Adult)  Goal: *Acute Goals and Plan of Care (Insert Text)  Description: Occupational Therapy Goals  Initiated 6/15/2020 within 7 day(s). 1.  Patient will perform grooming with supervision/set-up   2. Patient will perform bathing with supervision/set-up. 3.  Patient will perform lower body dressing with supervision/set-up. 4.  Patient will perform toilet transfers with supervision/set-up. 5.  Patient will perform all aspects of toileting with supervision/set-up. 6.  Patient will participate in upper extremity therapeutic exercise/activities with supervision/set-up for 5 minutes. 7.  Patient will utilize energy conservation techniques during functional activities with verbal cues. Prior Level of Function: Mod Independent with ADL's and transfers using a RW or 4 pt cane     Outcome: Progressing Towards Goal   OCCUPATIONAL THERAPY TREATMENT    Patient: Sb Valdes (6 y.o. male)  Date: 6/16/2020  Diagnosis: Hyponatremia [E87.1]   COPD with acute exacerbation ([facility] [address]       Precautions: Fall    Chart, occupational therapy assessment, plan of care, and goals were reviewed. ASSESSMENT:  Based on the information below, pt presents with decreased balance, strength, safety, and activity tolerance limiting pt's independence with ADL's and transfers. Pt received sitting on the EOB. Pt rated his pain level a 7/10 at rest in his LLE. OT had to cheri pt's socks with Max A seated on EOB. Pt did sit to stand from EOB with SBA/S. Pt ambulated to BR with RW and SBA/S for balance/safety. Pt did toilet transfer with SBA/S. Pt required a rest break due to SOB and required vc's for pursed lip breathing technique. Pt then ambulated back to his room and RT came to his room to give him a breathing treatment. As a result, OT terminated session early. Pt left sitting on the EOB with call light in reach and all needs met.   Progression toward goals:  [x]          Improving appropriately and progressing toward goals  []          Improving slowly and progressing toward goals  []          Not making progress toward goals and plan of care will be adjusted     PLAN:  Patient continues to benefit from skilled intervention to address the above impairments. Continue treatment per established plan of care. Discharge Recommendations:  Home Health  Further Equipment Recommendations for Discharge:  N/A     SUBJECTIVE:   Patient stated I want my breathing treatment now please.     OBJECTIVE DATA SUMMARY:   Cognitive/Behavioral Status:  Neurologic State: Alert, Appropriate for age  Orientation Level: Oriented X4  Cognition: Appropriate decision making, Follows commands  Safety/Judgement: Awareness of environment, Fall prevention    Functional Mobility and Transfers for ADLs:   Bed Mobility:   Scooting: Supervision   Transfers:  Sit to Stand: Stand-by assistance;Supervision   Toilet Transfer : Stand-by assistance;Supervision   Bathroom Mobility: Stand-by assistance;Supervision/set up      Balance:  Sitting: Intact  Standing: Intact; With support  Standing - Static: Good  Standing - Dynamic : Good    ADL Intervention:   Lower Body Dressing Assistance  Dressing Assistance: Maximum assistance  Socks: Maximum assistance  Leg Crossed Method Used: No  Position Performed: Seated edge of bed    Cognitive Retraining  Safety/Judgement: Awareness of environment; Fall prevention    Pain:  Pain level pre-treatment: 7/10   Pain level post-treatment: 7/10  Pain Intervention(s): Medication administered by RN (see MAR); Rest, Ice, Repositioning  Response to intervention: Nurse notified, See doc flow sheet    Activity Tolerance:    Fair. Pt required rest breaks during ADL's and transfers. Pt limited by SOB. Please refer to the flowsheet for vital signs taken during this treatment.   After treatment:   [x]  Patient left in no apparent distress sitting up on EOB  []  Patient left in no apparent distress in bed  [x]  Call bell left within reach  [x]  Nursing notified  []  Caregiver present  []  Bed alarm activated    COMMUNICATION/EDUCATION:   [x] Role of Occupational Therapy in the acute care setting  [x] Home safety education was provided and the patient/caregiver indicated understanding. [x] Patient/family have participated as able in working towards goals and plan of care. [x] Patient/family agree to work toward stated goals and plan of care. [] Patient understands intent and goals of therapy, but is neutral about his/her participation. [] Patient is unable to participate in goal setting and plan of care.       Thank you for this referral.  Debbi Tillman OTR/L MOT  Time Calculation: 8 mins

## 2020-06-16 NOTE — PROGRESS NOTES
Problem: Dysphagia (Adult)  Goal: *Acute Goals and Plan of Care (Insert Text)  Description: Dysphagia Present: mild oral  Aspiration: no overt s/sx aspiration    Recommendations:  Diet: dental soft solids/ thin  Meds: as tolerated  Aspiration Precautions  Oral Care TID    Goals:  Patient will:  1. Tolerate PO trials with no overt s/s aspiration or distress in 4/5 trials  2. Utilize compensatory swallow strategies/maneuvers (decrease bite/sip, size/rate, alt. liq/sol) with min cues in 4/5 trials  3. Perform oral-motor/laryngeal strengthening exercises with min cues to increase oropharyngeal swallow function   4. Complete an objective swallow study (i.e., MBSS) to assess swallow integrity, r/o aspiration, and determine of safest LRD, min A (goal met - 6.15.20). Outcome: Progressing Towards Goal  SPEECH-LANGUAGE PATHOLOGY BEDSIDE SWALLOW TREATMENT     Patient: Livan Stark (24 y.o. male)  Date: 6/17/2020  Primary Diagnosis: Hyponatremia [E87.1]  Precautions: Aspiration, Fall  PLOF: Soft foods    ASSESSMENT :  Patient seen this pm for dysphagia therapy. Patient presented with thin liquids, puree and soft solids, no overt s/sx aspiration this pm, and patient able to state swallow strategies with min A from therapist.  Patient educated with regard to altering solid, hard or dry foods by chopping textures, as well as adding gravy or a sauce. Extensive education performed with regard to energy conservation with meals, and no PO when more short of breath than usual, or eating smaller meals. PLAN :  Recommendations and Planned Interventions:  Per [facility]  Frequency/Duration: Patient will be followed by speech-language pathology 3 times a week to address goals. Discharge Recommendations: To Be Determined     SUBJECTIVE:   Patient stated I'm doing pretty good today. I'll make sure to call the nurse when I want to get back to bed.     OBJECTIVE:     Past Medical History:   Diagnosis Date    Brain aneurysm     Cancer [facility])     prostate    COPD (chronic obstructive pulmonary disease) (HCC)     Hypertension     Nocturia     Pneumonia     Radiation effect      Past Surgical History:   Procedure Laterality Date    HX HERNIA REPAIR       Home Situation:   Home Situation  Home Environment: Trailer/mobile home  # Steps to Enter: 6  Rails to Enter: Yes  Hand Rails : Bilateral  One/Two Story Residence: One story  Living Alone: No  Support Systems: Friends \ neighbors, Child(prashant)  Patient Expects to be Discharged to[de-identified] Trailer/mobile home  Current DME Used/Available at Home: Cane, quad, Oxygen, portable, Walker, rolling  Tub or Shower Type: Tub/Shower combination    Cognitive and Communication Status:  Neurologic State: Alert, Appropriate for age  Orientation Level: Oriented X4  Cognition: Appropriate decision making, Follows commands  Perception: Appears intact  Perseveration: No perseveration noted  Safety/Judgement: Awareness of environment, Fall prevention    PAIN:  Pain level pre-treatment: 0/10   Pain level post-treatment: 0/10     After treatment:   [x]            Patient left in no apparent distress sitting up in chair  []            Patient left in no apparent distress in bed  [x]            Call bell left within reach  [x]            Nursing notified  []            Family present  []            Caregiver present  []            Bed alarm activated    COMMUNICATION/EDUCATION:   [x]            Aspiration precautions; swallow safety; compensatory techniques. [x]            Patient/family have participated as able in goal setting and plan of care. []            Patient/family agree to work toward stated goals and plan of care. []            Patient understands intent and goals of therapy; neutral about participation. []            Patient unable to participate in goal setting/plan of care; educ ongoing with interdisciplinary staff  []         Posted safety precautions in patient's room.     Thank you for this referral.  Tara MICHAUD Jose Lovett, CCC-SLP     Time Calculation: 18 mins

## 2020-06-16 NOTE — PROGRESS NOTES
Problem: Mobility Impaired (Adult and Pediatric)  Goal: *Acute Goals and Plan of Care (Insert Text)  Description: Physical Therapy Goals  Initiated 6/14/2020 and to be accomplished within 3-7 day(s)  1. Patient will move from supine to sit and sit to supine  in bed with supervision/set-up. 2.  Patient will transfer from bed to chair and chair to bed with supervision/set-up using the least restrictive device. 3.  Patient will perform sit to stand with supervision/set-up. 4.  Patient will ambulate with supervision/set-up for 100 feet with the least restrictive device. 5.  Patient will ascend/descend 3 stairs with handrail(s) with minimal assistance/contact guard assist.     6/16/2020  PT treatment not completed due to:  [] Off Unit for testing/procedure  [] Pain  [x] Eating  [] Patient too lethargic  [] Nausea/vomiting  [] Dialysis treatment in progress   [] Other:  Will f/u at later time as pt schedule allows. Thank you.    Sammi Gar, PT

## 2020-06-16 NOTE — ROUTINE PROCESS
0715 received SBAR from night shift RN. Patient alert and oriented x 4. No complaint of pain/nausea. Patient has dyspnea on exertion. Hearing aides in. Patient eating all of meals today, no complaint of pain/nausea. Patient Vitals for the past 12 hrs:   Temp Pulse Resp BP SpO2   06/16/20 1659 98.9 °F (37.2 °C) 75 17 166/78 98 %   06/16/20 1525 -- -- -- -- 99 %   06/16/20 1236 98.6 °F (37 °C) 74 19 140/70 97 %   06/16/20 1147 -- -- -- -- 97 %   06/16/20 1014 98.7 °F (37.1 °C) 79 18 146/71 96 %     Bedside and Verbal shift change report given to CAITLIN Pérez (oncoming nurse) by Robe Macias RN (offgoing nurse). Report included the following information SBAR, Kardex, Intake/Output, MAR and Recent Results.

## 2020-06-16 NOTE — CDMP QUERY
Pt admitted with COPD exacerbation . Pt noted to be on 2L home o2 Jen Rm If possible, please document in the progress notes and d/c summary if you are evaluating and / or treating any of the following: 
 
? Chronic respiratory failure 
o With hypoxia 
o With hypercapnia 
? Other, please specify ? Clinically unable to determine The medical record reflects the following: 
   Risk Factors: COPD Clinical Indicators:SOB , COPD Treatment: o2 2L, Thank- You Malika Zuniga RN CDI [phone]

## 2020-06-16 NOTE — PROGRESS NOTES
Hospitalist Progress Note    Patient: Sedrick Trujillo MRN: [id number]  CSN: [id number]    YOB: 1945  Age: 66 y.o.   Sex: male    DOA: 6/13/2020 LOS:  LOS: 3 days          Chief Complaint:    COPD      Assessment/Plan       Acute COPD exacerbation-CXR clear, wheezing subsiding, wean steroids, nebs PRN    Chronic resp failure with chronic 02 dependence-at baseline requirement-PT again today to test strength    Acute bronchitis    Hyponatremia-improved off diuretic  Paroxysmal A fib-stable rate, apparently off eliquis PTA  Asbestosis  Hypertension-stable  CKD stage 3  Hypokalemia-PO Kdur    Echo result shows nl EF  MBS was neg for aspiration  Continue empiric IV abx for bronchitis  Wean steroids  DVT prev-lovenox    PT    Home meds cardizem and flomax ordered on admission, but not released-so new orders placed to resume these      Disposition :home wednesday  Patient Active Problem List   Diagnosis Code    Hypertension I10    COPD (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 (HCC) J44.9    Chronic renal disease, stage 3, moderately decreased glomerular filtration rate between 30-59 mL/min/1.73 square meter ([address] N18.3    Asbestosis ([address] J61    Acute exacerbation of chronic obstructive pulmonary disease (COPD) ([address] J44.1    Debility R53.81    Paroxysmal atrial fibrillation (HCC) I48.0    Chronic respiratory failure with hypoxia ([address] J96.11    Tobacco dependence F17.200    Hyponatremia E87.1    Pleural effusion, right J90    COPD with acute exacerbation ([address] J44.1    Acute respiratory failure with hypoxia and hypercarbia (HCC) J96.01, J96.02    Hyponatremia E87.1    Advanced care planning/counseling discussion Z71.89    Hypokalemia E87.6       Subjective:    Doing ok  Still a bit SOB but improving slowly    Review of systems:    Constitutional: denies fevers, chills, myalgias  Respiratory: denies  cough  Cardiovascular: denies chest pain, palpitations  Gastrointestinal: denies nausea, vomiting, diarrhea      Vital signs/Intake and Output:  Visit Vitals  /70   Pulse 74   Temp 98.6 °F (37 °C)   Resp 19   Ht 5' 8\" (1.727 m)   Wt 96.2 kg (212 lb)   SpO2 97%   BMI 32.23 kg/m²     Current Shift:  06/16 0701 - 06/16 1900  In: -   Out: 350 [Urine:350]  Last three shifts:  06/14 1901 - 06/16 0700  In: 1680 [P.O.:1080; I.V.:600]  Out: 1200 [Urine:1200]    Exam:    General: elderly WM alert, NAD, OX3  Head/Neck: NCAT, supple, No masses, No lymphadenopathy  CVS:Regular rate and rhythm, no M/R/G, S1/S2 heard, no thrill  Lungs:Coarse BS, exp wheezes few, dec BS bases  Abdomen: Soft, Nontender, No distention, Normal Bowel sounds, No hepatomegaly  Extremities: No C/C/E, pulses palpable 2+  Skin:normal texture and turgor, no rashes, no lesions  Neuro:grossly normal , follows commands  Psych:appropriate                Labs: Results:       Chemistry Recent Labs     06/16/20  0205 06/15/20  0458   * 139*   * 133*   K 3.3* 3.6   CL 98* 97*   CO2 28 27   BUN 34* 28*   CREA 1.33* 1.26   CA 7.6* 8.2*   AGAP 9 9   BUCR 26* 22*   AP 65 74   TP 5.6* 6.1*   ALB 2.7* 2.9*   GLOB 2.9 3.2   AGRAT 0.9 0.9      CBC w/Diff Recent Labs     06/16/20  0205 06/15/20  0458   WBC 8.2 9.7   RBC 3.51* 3.65*   HGB 10.0* 10.4*   HCT 31.0* 31.5*    342   GRANS 85* 91*   LYMPH 6* 5*   EOS 0 0      Cardiac Enzymes No results for input(s): CPK, CKND1, WILLARD in the last 72 hours. No lab exists for component: CKRMB, TROIP   Coagulation No results for input(s): PTP, INR, APTT, INREXT in the last 72 hours. Lipid Panel No results found for: CHOL, CHOLPOCT, CHOLX, CHLST, CHOLV, 577256, HDL, HDLP, LDL, LDLC, DLDLP, 733913, VLDLC, VLDL, TGLX, TRIGL, TRIGP, TGLPOCT, CHHD, CHHDX   BNP No results for input(s): BNPP in the last 72 hours.    Liver Enzymes Recent Labs     06/16/20  0205   TP 5.6*   ALB 2.7*   AP 65      Thyroid Studies Lab Results   Component Value Date/Time    TSH 0.86 10/20/2019 01:05 AM        Procedures/imaging: see electronic medical records for all procedures/Xrays and details which were not copied into this note but were reviewed prior to creation of Ivan Saravia MD

## 2020-06-17 ENCOUNTER — APPOINTMENT (OUTPATIENT)
Dept: GENERAL RADIOLOGY | Age: 75
DRG: 191 | End: 2020-06-17
Attending: FAMILY MEDICINE
Payer: MEDICARE

## 2020-06-17 ENCOUNTER — HOME HEALTH ADMISSION (OUTPATIENT)
Dept: HOME HEALTH SERVICES | Facility: HOME HEALTH | Age: 75
End: 2020-06-17

## 2020-06-17 VITALS
HEART RATE: 73 BPM | BODY MASS INDEX: 32.13 KG/M2 | WEIGHT: 212 LBS | DIASTOLIC BLOOD PRESSURE: 54 MMHG | TEMPERATURE: 98.5 F | OXYGEN SATURATION: 98 % | SYSTOLIC BLOOD PRESSURE: 140 MMHG | HEIGHT: 68 IN | RESPIRATION RATE: 19 BRPM

## 2020-06-17 LAB
ALBUMIN SERPL-MCNC: 2.8 G/DL (ref 3.4–5)
ALBUMIN/GLOB SERPL: 0.9 {RATIO} (ref 0.8–1.7)
ALP SERPL-CCNC: 58 U/L (ref 45–117)
ALT SERPL-CCNC: 17 U/L (ref 16–61)
ANION GAP SERPL CALC-SCNC: 7 MMOL/L (ref 3–18)
AST SERPL-CCNC: 9 U/L (ref 10–38)
BACTERIA SPEC CULT: ABNORMAL
BACTERIA SPEC CULT: ABNORMAL
BASOPHILS # BLD: 0 K/UL (ref 0–0.1)
BASOPHILS NFR BLD: 0 % (ref 0–2)
BILIRUB SERPL-MCNC: 0.4 MG/DL (ref 0.2–1)
BUN SERPL-MCNC: 33 MG/DL (ref 7–18)
BUN/CREAT SERPL: 25 (ref 12–20)
CALCIUM SERPL-MCNC: 7.7 MG/DL (ref 8.5–10.1)
CHLORIDE SERPL-SCNC: 99 MMOL/L (ref 100–111)
CO2 SERPL-SCNC: 30 MMOL/L (ref 21–32)
CREAT SERPL-MCNC: 1.33 MG/DL (ref 0.6–1.3)
DIFFERENTIAL METHOD BLD: ABNORMAL
EOSINOPHIL # BLD: 0 K/UL (ref 0–0.4)
EOSINOPHIL NFR BLD: 0 % (ref 0–5)
ERYTHROCYTE [DISTWIDTH] IN BLOOD BY AUTOMATED COUNT: 13.6 % (ref 11.6–14.5)
GLOBULIN SER CALC-MCNC: 3.1 G/DL (ref 2–4)
GLUCOSE BLD STRIP.AUTO-MCNC: 149 MG/DL (ref 70–110)
GLUCOSE SERPL-MCNC: 149 MG/DL (ref 74–99)
GRAM STN SPEC: ABNORMAL
HCT VFR BLD AUTO: 31.8 % (ref 36–48)
HGB BLD-MCNC: 10.3 G/DL (ref 13–16)
LYMPHOCYTES # BLD: 0.4 K/UL (ref 0.9–3.6)
LYMPHOCYTES NFR BLD: 5 % (ref 21–52)
MCH RBC QN AUTO: 28.5 PG (ref 24–34)
MCHC RBC AUTO-ENTMCNC: 32.4 G/DL (ref 31–37)
MCV RBC AUTO: 87.8 FL (ref 74–97)
MONOCYTES # BLD: 0.4 K/UL (ref 0.05–1.2)
MONOCYTES NFR BLD: 7 % (ref 3–10)
NEUTS SEG # BLD: 5.7 K/UL (ref 1.8–8)
NEUTS SEG NFR BLD: 88 % (ref 40–73)
PLATELET # BLD AUTO: 275 K/UL (ref 135–420)
PMV BLD AUTO: 8.5 FL (ref 9.2–11.8)
POTASSIUM SERPL-SCNC: 3.7 MMOL/L (ref 3.5–5.5)
PROT SERPL-MCNC: 5.9 G/DL (ref 6.4–8.2)
RBC # BLD AUTO: 3.62 M/UL (ref 4.7–5.5)
SERVICE CMNT-IMP: ABNORMAL
SODIUM SERPL-SCNC: 136 MMOL/L (ref 136–145)
WBC # BLD AUTO: 6.5 K/UL (ref 4.6–13.2)

## 2020-06-17 PROCEDURE — 71045 X-RAY EXAM CHEST 1 VIEW: CPT

## 2020-06-17 PROCEDURE — 92526 ORAL FUNCTION THERAPY: CPT

## 2020-06-17 PROCEDURE — 94640 AIRWAY INHALATION TREATMENT: CPT

## 2020-06-17 PROCEDURE — 97535 SELF CARE MNGMENT TRAINING: CPT

## 2020-06-17 PROCEDURE — 80053 COMPREHEN METABOLIC PANEL: CPT

## 2020-06-17 PROCEDURE — 36415 COLL VENOUS BLD VENIPUNCTURE: CPT

## 2020-06-17 PROCEDURE — 74011250637 HC RX REV CODE- 250/637: Performed by: FAMILY MEDICINE

## 2020-06-17 PROCEDURE — 94760 N-INVAS EAR/PLS OXIMETRY 1: CPT

## 2020-06-17 PROCEDURE — 82962 GLUCOSE BLOOD TEST: CPT

## 2020-06-17 PROCEDURE — 85025 COMPLETE CBC W/AUTO DIFF WBC: CPT

## 2020-06-17 PROCEDURE — 74011250637 HC RX REV CODE- 250/637: Performed by: HOSPITALIST

## 2020-06-17 PROCEDURE — 97110 THERAPEUTIC EXERCISES: CPT

## 2020-06-17 PROCEDURE — 74011250636 HC RX REV CODE- 250/636: Performed by: INTERNAL MEDICINE

## 2020-06-17 PROCEDURE — 74011000250 HC RX REV CODE- 250: Performed by: INTERNAL MEDICINE

## 2020-06-17 PROCEDURE — 74011250637 HC RX REV CODE- 250/637: Performed by: INTERNAL MEDICINE

## 2020-06-17 PROCEDURE — 74011250636 HC RX REV CODE- 250/636: Performed by: HOSPITALIST

## 2020-06-17 PROCEDURE — 97116 GAIT TRAINING THERAPY: CPT

## 2020-06-17 PROCEDURE — 74011000258 HC RX REV CODE- 258: Performed by: INTERNAL MEDICINE

## 2020-06-17 RX ORDER — DOXYCYCLINE 100 MG/1
100 CAPSULE ORAL 2 TIMES DAILY
Qty: 20 CAP | Refills: 0 | Status: SHIPPED | OUTPATIENT
Start: 2020-06-17 | End: 2020-06-27

## 2020-06-17 RX ORDER — PREDNISONE 10 MG/1
TABLET ORAL
Qty: 21 TAB | Refills: 0 | Status: ON HOLD | OUTPATIENT
Start: 2020-06-17 | End: 2020-08-07

## 2020-06-17 RX ADMIN — IPRATROPIUM BROMIDE AND ALBUTEROL SULFATE 3 ML: .5; 3 SOLUTION RESPIRATORY (INHALATION) at 11:24

## 2020-06-17 RX ADMIN — POTASSIUM CHLORIDE 40 MEQ: 1500 TABLET, EXTENDED RELEASE ORAL at 01:38

## 2020-06-17 RX ADMIN — IPRATROPIUM BROMIDE AND ALBUTEROL SULFATE 3 ML: .5; 3 SOLUTION RESPIRATORY (INHALATION) at 04:52

## 2020-06-17 RX ADMIN — TAMSULOSIN HYDROCHLORIDE 0.4 MG: 0.4 CAPSULE ORAL at 10:00

## 2020-06-17 RX ADMIN — IPRATROPIUM BROMIDE AND ALBUTEROL SULFATE 3 ML: .5; 3 SOLUTION RESPIRATORY (INHALATION) at 00:19

## 2020-06-17 RX ADMIN — PIPERACILLIN AND TAZOBACTAM 3.38 G: 3; .375 INJECTION, POWDER, LYOPHILIZED, FOR SOLUTION INTRAVENOUS at 04:23

## 2020-06-17 RX ADMIN — BUDESONIDE 500 MCG: 0.25 INHALANT RESPIRATORY (INHALATION) at 07:55

## 2020-06-17 RX ADMIN — PIPERACILLIN AND TAZOBACTAM 3.38 G: 3; .375 INJECTION, POWDER, LYOPHILIZED, FOR SOLUTION INTRAVENOUS at 10:00

## 2020-06-17 RX ADMIN — DILTIAZEM HYDROCHLORIDE 30 MG: 30 TABLET, FILM COATED ORAL at 07:16

## 2020-06-17 RX ADMIN — METHYLPREDNISOLONE SODIUM SUCCINATE 40 MG: 40 INJECTION, POWDER, FOR SOLUTION INTRAMUSCULAR; INTRAVENOUS at 07:16

## 2020-06-17 RX ADMIN — IPRATROPIUM BROMIDE AND ALBUTEROL SULFATE 3 ML: .5; 3 SOLUTION RESPIRATORY (INHALATION) at 15:56

## 2020-06-17 RX ADMIN — IPRATROPIUM BROMIDE AND ALBUTEROL SULFATE 3 ML: .5; 3 SOLUTION RESPIRATORY (INHALATION) at 07:55

## 2020-06-17 RX ADMIN — HYDROCORTISONE: 1 CREAM TOPICAL at 10:01

## 2020-06-17 RX ADMIN — ENOXAPARIN SODIUM 40 MG: 40 INJECTION SUBCUTANEOUS at 12:42

## 2020-06-17 RX ADMIN — DILTIAZEM HYDROCHLORIDE 30 MG: 30 TABLET, FILM COATED ORAL at 12:42

## 2020-06-17 RX ADMIN — GUAIFENESIN 1200 MG: 600 TABLET, EXTENDED RELEASE ORAL at 10:00

## 2020-06-17 NOTE — PROGRESS NOTES
Hospitalist Progress Note    Patient: Randi Rivas MRN: [id number]  CSN: [id number]    YOB: 1945  Age: 66 y.o. Sex: male    DOA: 6/13/2020 LOS:  LOS: 4 days          Chief Complaint:    COPD    Assessment/Plan     Acute COPD exacerbation-CXR clear, wheezing subsiding, wean steroids, nebs PRN    Chronic resp failure with chronic 02 dependence-at baseline requirement-PT again today to test strength    Acute bronchitis -  Sputum culture from [address] laboratory shows MRSA  Briefly spoke with infectious disease physician Dr. Nikki Vuong, mentions repeat chest x-ray make sure no obvious new changes if not discharge patient home on doxycycline  CXR did not show any significant change     Hyponatremia-resolved  Paroxysmal A fib-stable rate, apparently off eliquis PTA  Asbestosis  Hypertension-stable  CKD stage 3  Hypokalemia-PO Kdur    Echo result shows nl EF  MBS was neg for aspiration  Wean steroids  DVT prev-lovenox    D/w pt's wife. Will make arrangements to transport pt home if all cleared. Disposition :home today  Patient Active Problem List   Diagnosis Code    Hypertension I10    COPD (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 ([facility]) J44.9    Chronic renal disease, stage 3, moderately decreased glomerular filtration rate between 30-59 mL/min/1.73 square meter ([facility]) N18.3    Asbestosis ([facility] [address] J61    Acute exacerbation of chronic obstructive pulmonary disease (COPD) ([facility] [address] J44.1    Debility R53.81    Paroxysmal atrial fibrillation ([facility]) I48.0    Chronic respiratory failure with hypoxia ([facility]) J96.11    Tobacco dependence F17.200    Hyponatremia E87.1    Pleural effusion, right J90    COPD with acute exacerbation ([facility]) J44.1    Acute respiratory failure with hypoxia and hypercarbia ([facility]) J96.01, J96.02    Hyponatremia E87.1    Advanced care planning/counseling discussion Z71.89    Hypokalemia E87.6       Subjective:    Doing ok.    Feeling better and really wants to go home. Review of systems:    Constitutional: denies fevers, chills, myalgias  Respiratory: denies  cough  Cardiovascular: denies chest pain, palpitations  Gastrointestinal: denies nausea, vomiting, diarrhea      Vital signs/Intake and Output:  Visit Vitals  /69   Pulse 71   Temp 97.7 °F (36.5 °C)   Resp 18   Ht 5' 8\" (1.727 m)   Wt 96.2 kg (212 lb)   SpO2 98%   BMI 32.23 kg/m²     Current Shift:  06/17 0701 - 06/17 1900  In: 120 [P.O.:120]  Out: -   Last three shifts:  06/15 1901 - 06/17 0700  In: 640 [P.O.:240; I.V.:400]  Out: 1975 [Urine:1975]    Exam:    General: elderly WM alert, NAD, OX3  Head/Neck: NCAT, supple, No masses, No lymphadenopathy  CVS:Regular rate and rhythm, no M/R/G, S1/S2 heard, no thrill  Lungs:Coarse BS, exp wheezes few, dec BS bases  Abdomen: Soft, Nontender, No distention, Normal Bowel sounds, No hepatomegaly  Extremities: No C/C/E, pulses palpable 2+  Skin:normal texture and turgor, no rashes, no lesions  Neuro:grossly normal , follows commands  Psych:appropriate                Labs: Results:       Chemistry Recent Labs     06/17/20  0248 06/16/20  0205 06/15/20  0458   * 132* 139*    135* 133*   K 3.7 3.3* 3.6   CL 99* 98* 97*   CO2 30 28 27   BUN 33* 34* 28*   CREA 1.33* 1.33* 1.26   CA 7.7* 7.6* 8.2*   AGAP 7 9 9   BUCR 25* 26* 22*   AP 58 65 74   TP 5.9* 5.6* 6.1*   ALB 2.8* 2.7* 2.9*   GLOB 3.1 2.9 3.2   AGRAT 0.9 0.9 0.9      CBC w/Diff Recent Labs     06/17/20 0248 06/16/20 0205 06/15/20  0458   WBC 6.5 8.2 9.7   RBC 3.62* 3.51* 3.65*   HGB 10.3* 10.0* 10.4*   HCT 31.8* 31.0* 31.5*    326 342   GRANS 88* 85* 91*   LYMPH 5* 6* 5*   EOS 0 0 0      Cardiac Enzymes No results for input(s): CPK, CKND1, WILLARD in the last 72 hours. No lab exists for component: CKRMB, TROIP   Coagulation No results for input(s): PTP, INR, APTT, INREXT, INREXT in the last 72 hours.     Lipid Panel No results found for: CHOL, CHOLPOCT, CHOLX, CHLST, CHOLV, 937492, HDL, HDLP, LDL, LDLC, DLDLP, 708569, VLDLC, VLDL, TGLX, TRIGL, TRIGP, TGLPOCT, CHHD, CHHDX   BNP No results for input(s): BNPP in the last 72 hours.    Liver Enzymes Recent Labs     06/17/20  0248   TP 5.9*   ALB 2.8*   AP 58      Thyroid Studies Lab Results   Component Value Date/Time    TSH 0.86 10/20/2019 01:05 AM        Procedures/imaging: see electronic medical records for all procedures/Xrays and details which were not copied into this note but were reviewed prior to creation of Nnamdi Hendrix MD

## 2020-06-17 NOTE — PROGRESS NOTES
Problem: Self Care Deficits Care Plan (Adult)  Goal: *Acute Goals and Plan of Care (Insert Text)  Description: Occupational Therapy Goals  Initiated 6/15/2020 within 7 day(s). 1.  Patient will perform grooming with supervision/set-up   2. Patient will perform bathing with supervision/set-up. 3.  Patient will perform lower body dressing with supervision/set-up. 4.  Patient will perform toilet transfers with supervision/set-up. 5.  Patient will perform all aspects of toileting with supervision/set-up. 6.  Patient will participate in upper extremity therapeutic exercise/activities with supervision/set-up for 5 minutes. 7.  Patient will utilize energy conservation techniques during functional activities with verbal cues. Prior Level of Function: Mod Independent with ADL's and transfers using a RW or 4 pt cane     Outcome: Progressing Towards Goal   OCCUPATIONAL THERAPY TREATMENT    Patient: Clement Moura (30 y.o. male)  Date: 6/17/2020  Diagnosis: Hyponatremia [E87.1]   COPD with acute exacerbation ([facility] [address]       Precautions: Fall    Chart, occupational therapy assessment, plan of care, and goals were reviewed. ASSESSMENT:  Based on the information below, pt presents with decreased strength, balance, and activity tolerance which is limiting pt's independence with ADL's and transfers. Pt received sitting on the EOB. Pt rated his pina level a 7/10 at rest in his LLE. Pt donned his slippers sitting on the EOB with setup. Pt bent forward to complete task. Pt did sit to stand from EOB with supervision and good safety awareness. Pt ambulated to  and did a toilet transfer with supervision using grab bar on the right side to assist with transfer. Pt then stood at the sink to wash his hands with supervision. Pt returned back to EOB and did BUE exercises seated x 15 reps each. Pt left sitting on the EOB with call light in reach and all needs met.   Progression toward goals:  [x]          Improving appropriately and progressing toward goals  []          Improving slowly and progressing toward goals  []          Not making progress toward goals and plan of care will be adjusted     PLAN:  Patient continues to benefit from skilled intervention to address the above impairments. Continue treatment per established plan of care. Discharge Recommendations:  Home Health  Further Equipment Recommendations for Discharge:  N/A     SUBJECTIVE:   Patient stated Cassie Soto did this to myself. I can't blame anyone but myself for my COPD.    OBJECTIVE DATA SUMMARY:   Cognitive/Behavioral Status:  Neurologic State: Alert, Appropriate for age  Orientation Level: Oriented X4  Cognition: Appropriate decision making, Follows commands, Appropriate safety awareness, Appropriate for age attention/concentration  Safety/Judgement: Awareness of environment, Fall prevention    Functional Mobility and Transfers for ADLs:   Bed Mobility:   Scooting: Supervision   Transfers:  Sit to Stand: Supervision   Toilet Transfer : Supervision   Bathroom Mobility: Supervision/set up      Balance:  Sitting: Intact  Standing: Intact; With support  Standing - Static: Good  Standing - Dynamic : Good    ADL Intervention:   Grooming  Grooming Assistance: Supervision  Position Performed: Standing  Washing Hands: Supervision    Lower Body Dressing Assistance  Dressing Assistance: Set-up  Slip on Shoes with Back: Set-up  Leg Crossed Method Used: No  Position Performed: Bending forward method;Seated edge of bed    Cognitive Retraining  Safety/Judgement: Awareness of environment; Fall prevention    UE Therapeutic Exercises:   BUE bicep curls, chest press, and overhead press x 15 reps each with vc's for pursed lip breathing    Pain:  Pain level pre-treatment: 7/10   Pain level post-treatment: 6/10  Pain Intervention(s): Medication administered by RN (see MAR); Rest, Ice, Repositioning  Response to intervention: Nurse notified, See doc flow sheet    Activity Tolerance:    Fair.  Pt able to stand at the sink ~1 minute during grooming tasks. Pt limited by SOB. Please refer to the flowsheet for vital signs taken during this treatment. After treatment:   [x]  Patient left in no apparent distress sitting up on EOB  []  Patient left in no apparent distress in bed  [x]  Call bell left within reach  [x]  Nursing notified  []  Caregiver present  []  Bed alarm activated    COMMUNICATION/EDUCATION:   [x] Role of Occupational Therapy in the acute care setting  [x] Home safety education was provided and the patient/caregiver indicated understanding. [x] Patient/family have participated as able in working towards goals and plan of care. [x] Patient/family agree to work toward stated goals and plan of care. [] Patient understands intent and goals of therapy, but is neutral about his/her participation. [] Patient is unable to participate in goal setting and plan of care.       Thank you for this referral.  Nik Tillman OTR/L MOT  Time Calculation: 23 mins

## 2020-06-17 NOTE — PROGRESS NOTES
Problem: Mobility Impaired (Adult and Pediatric)  Goal: *Acute Goals and Plan of Care (Insert Text)  Description: Physical Therapy Goals  Initiated 6/14/2020 and to be accomplished within 3-7 day(s)  1. Patient will move from supine to sit and sit to supine  in bed with supervision/set-up. 2.  Patient will transfer from bed to chair and chair to bed with supervision/set-up using the least restrictive device. 3.  Patient will perform sit to stand with supervision/set-up. 4.  Patient will ambulate with supervision/set-up for 100 feet with the least restrictive device. 5.  Patient will ascend/descend 3 stairs with handrail(s) with minimal assistance/contact guard assist.   Outcome: Progressing Towards Goal   PHYSICAL THERAPY TREATMENT    Patient: Renetta Cervantes (32 y.o. male)  Date: 6/17/2020  Diagnosis: Hyponatremia [E87.1]   COPD with acute exacerbation ([facility])    Precautions: Fall  PLOF: ambulatory with cane or RW    ASSESSMENT:  No assistance needed for supine to sit or sit to stand. Ambulated in room with rw, 60ft, reciprocal gt pattern, decreased step height, steady pace, no LOB or path deviations. Pt left sitting EOB. Safe to return home. Progression toward goals:   [x]      Improving appropriately and progressing toward goals  []      Improving slowly and progressing toward goals  []      Not making progress toward goals and plan of care will be adjusted     PLAN:  Patient continues to benefit from skilled intervention to address the above impairments. Continue treatment per established plan of care. Discharge Recommendations:  Home Health  Further Equipment Recommendations for Discharge:  rolling walker     SUBJECTIVE:   Patient stated I am waiting for the DR to go home.     OBJECTIVE DATA SUMMARY:   Critical Behavior:  Neurologic State: Alert, Appropriate for age  Orientation Level: Oriented X4  Cognition: Appropriate decision making, Follows commands, Appropriate safety awareness, Appropriate for age attention/concentration  Safety/Judgement: Awareness of environment, Fall prevention  Functional Mobility Training:  Bed Mobility:    Supine to Sit: Supervision    Scooting: Supervision    Transfers:  Sit to Stand: Supervision  Stand to Sit: Supervision  Balance:  Sitting: Intact  Standing: Intact; With support  Standing - Static: Good  Standing - Dynamic : Good     Ambulation/Gait Training:  Distance (ft): 60 Feet (ft)  Assistive Device: Gait belt;Walker, rolling  Ambulation - Level of Assistance: Stand-by assistance;Supervision    Gait Abnormalities: Decreased step clearance  Pain:  Pain level pre-treatment: 0/10  Pain level post-treatment: 0/10   Pain Intervention(s): Medication (see MAR); Rest, Ice, Repositioning   Response to intervention: Nurse notified, See doc flow    Activity Tolerance:   Good  Please refer to the flowsheet for vital signs taken during this treatment. After treatment:   [] Patient left in no apparent distress sitting up in chair  [x] Patient left in no apparent distress in bed  [x] Call bell left within reach  [] Nursing notified  [] Caregiver present  [] Bed alarm activated  [] SCDs applied      COMMUNICATION/EDUCATION:   [x]         Role of Physical Therapy in the acute care setting. [x]         Fall prevention education was provided and the patient/caregiver indicated understanding. [x]         Patient/family have participated as able in working toward goals and plan of care. [x]         Patient/family agree to work toward stated goals and plan of care. []         Patient understands intent and goals of therapy, but is neutral about his/her participation.   []         Patient is unable to participate in stated goals/plan of care: ongoing with therapy staff.  []         Other:        Nicole Gonzalez PTA   Time Calculation: 10 mins

## 2020-06-17 NOTE — PROGRESS NOTES
D/C Plan: [facility] and physician follow up     CM met with pt at bedside to discuss transition of care. CM discussed/offered HH vs SNF and pt continues to decline both services. CM discussed H2H and pt is agreeable. Pt is anxious to be discharged. Anticipate pt will transition home with in the next 24 hours with [facility] and physician follow up. No other transition of care needs have been identified. Care Management Interventions  PCP Verified by CM: Yes  Mode of Transport at Discharge:  Other (see comment)(family)  Transition of Care Consult (CM Consult): Discharge Planning  Health Maintenance Reviewed: Yes  Physical Therapy Consult: Yes  Occupational Therapy Consult: Yes  Speech Therapy Consult: Yes  Current Support Network: Family Lives Nearby  Confirm Follow Up Transport: Family  The Plan for Transition of Care is Related to the Following Treatment Goals : [facility] and physician follow up   Discharge Location  Discharge Placement: Home with family assistance(H2H)]

## 2020-06-17 NOTE — DISCHARGE INSTRUCTIONS
Patient Education        Chronic Obstructive Pulmonary Disease (COPD): Care Instructions  Your Care Instructions     Chronic obstructive pulmonary disease (COPD) is a general term for a group of lung diseases, including emphysema and chronic bronchitis. People with COPD have decreased airflow in and out of the lungs, which makes it hard to breathe. The airways also can get clogged with thick mucus. Cigarette smoking is a major cause of COPD. Although there is no cure for COPD, you can slow its progress. Following your treatment plan and taking care of yourself can help you feel better and live longer. Follow-up care is a key part of your treatment and safety. Be sure to make and go to all appointments, and call your doctor if you are having problems. It's also a good idea to know your test results and keep a list of the medicines you take. How can you care for yourself at home? Staying healthy  · Do not smoke. This is the most important step you can take to prevent more damage to your lungs. If you need help quitting, talk to your doctor about stop-smoking programs and medicines. These can increase your chances of quitting for good. · Avoid colds and flu. Get a pneumococcal vaccine shot. If you have had one before, ask your doctor whether you need a second dose. Get the flu vaccine every fall. If you must be around people with colds or the flu, wash your hands often. · Avoid secondhand smoke, air pollution, and high altitudes. Also avoid cold, dry air and hot, humid air. Stay at home with your windows closed when air pollution is bad. Medicines and oxygen therapy  · Take your medicines exactly as prescribed. Call your doctor if you think you are having a problem with your medicine. You may be taking medicines such as:  ? Bronchodilators. These help open your airways and make breathing easier. They are either short-acting (work for 6 to 9 hours) or long-acting (work for 24 hours).  You inhale most bronchodilators, so they start to act quickly. Always carry your quick-relief inhaler with you in case you need it while you are away from home. ? Corticosteroids (prednisone, budesonide). These reduce airway inflammation. They come in pill or inhaled form. You must take these medicines every day for them to work well. · Ask your doctor or pharmacist if a spacer is right for you. A spacer may help you get more inhaled medicine to your lungs. If you use one, ask how to use it properly. · Do not take any vitamins, over-the-counter medicine, or herbal products without talking to your doctor first.  · If your doctor prescribed antibiotics, take them as directed. Do not stop taking them just because you feel better. You need to take the full course of antibiotics. · If you use oxygen therapy, use the flow rate your doctor has recommended. Don't change it without talking to your doctor first. Oxygen therapy boosts the amount of oxygen in your blood and helps you breathe easier. Activity  · Get regular exercise. Walking is an easy way to get exercise. Start out slowly, and walk a little more each day. · Pay attention to your breathing. You are exercising too hard if you can't talk while you exercise. · Take short rest breaks when doing household chores and other activities. · Learn breathing methods--such as breathing through pursed lips--to help you become less short of breath. · If your doctor has not set you up with a pulmonary rehabilitation program, ask if rehab is right for you. Rehab includes exercise programs, education about your disease and how to manage it, help with diet and other changes, and emotional support. Diet  · Eat regular, healthy meals. Use bronchodilators about 1 hour before you eat to make it easier to eat. Eat several small meals instead of three large ones. Drink beverages at the end of the meal. Avoid foods that are hard to chew.   · Eat foods that contain protein so you don't lose muscle mass. · Talk with your doctor if you gain too much weight or if you lose weight without trying. Mental health  · Talk to your family, friends, or a therapist about your feelings. Some people feel frightened, angry, hopeless, helpless, and even guilty. Talking openly about bad feelings can help you cope. If these feelings last, talk to your doctor. When should you call for help? [id number] anytime you think you may need emergency care. For example, call if:  · You have severe trouble breathing. Call your doctor now or seek immediate medical care if:  · You have new or worse trouble breathing. · You cough up blood. · You have a fever. Watch closely for changes in your health, and be sure to contact your doctor if:  · You cough more deeply or more often, especially if you notice more mucus or a change in the color of your mucus. · You have new or worse swelling in your legs or belly. · You are not getting better as expected. Where can you learn more? Go to http://ifeoma-jana.info/  Enter D387 in the search box to learn more about \"Chronic Obstructive Pulmonary Disease (COPD): Care Instructions. \"  Current as of: February 24, 2020               Content Version: 12.5  © 1111-1600 [employer]. Care instructions adapted under license by [employer] (which disclaims liability or warranty for this information). If you have questions about a medical condition or this instruction, always ask your healthcare professional. [place] [address] any warranty or liability for your use of this information.

## 2020-06-17 NOTE — PROGRESS NOTES
Discharge instructions reviewed with the patient. Patient verbalized understanding and verified by teach back. All questions answered. IV discontinued, no redness, swelling or pain noted. Patient awaiting spouse for transportation home, with an ETA of 30 min.  Patient armband removed and shredded

## 2020-06-17 NOTE — PROGRESS NOTES
Pt in stable condition, no s/s of acute distress noted/vocied. Plan of care discussed, verbalized understanding. Comfort and care given. All safety and comfort measures in place. Aspiration precaution in place. Chronic O2 - NC. Course Dread Breath sounds. Scheduled neb tx. Infrequent cough with minimal sputum noted. Dyspnea at rest and with exertion. Dread hearing aids. Pink storage cup given. MD called for POC order due to steroid and cardiac diet orders. [id number]:  Bedside shift change report given to [address] [place] [address],[address] (oncoming nurse) by Mary Mancia RN (offgoing nurse). Report included the following information SBAR, Kardex, Procedure Summary, MAR and Recent Results.

## 2020-06-17 NOTE — PROGRESS NOTES
Problem: Mobility Impaired (Adult and Pediatric)  Goal: *Acute Goals and Plan of Care (Insert Text)  Description: Physical Therapy Goals  Initiated 6/14/2020 and to be accomplished within 3-7 day(s)  1. Patient will move from supine to sit and sit to supine  in bed with supervision/set-up. 2.  Patient will transfer from bed to chair and chair to bed with supervision/set-up using the least restrictive device. 3.  Patient will perform sit to stand with supervision/set-up. 4.  Patient will ambulate with supervision/set-up for 100 feet with the least restrictive device. 5.  Patient will ascend/descend 3 stairs with handrail(s) with minimal assistance/contact guard assist.     Outcome: Progressing Towards Goal  PHYSICAL THERAPY TREATMENT    Patient: Gwendolyn Barakat (1 y.o. male)  Date: 6/16/2020  Diagnosis: Hyponatremia [E87.1]   COPD with acute exacerbation ([facility] [address]  Precautions: Fall   Chart, physical therapy assessment, plan of care and goals were reviewed. ASSESSMENT:  Pt found seated EOB with O2 nc in place. Pt able to progress gt distance to 80ft with 1 standing rest break due to SOB. O2 in place during session; O2 sat in high 90's throughout activities. Pt educated in pacing self and taking rest breaks as needed. Pt returned to room and left up in chair at bed side in NAD with all needs in reach. Will continue for goals as noted. Progression toward goals:  [x]      Improving appropriately and progressing toward goals  []      Improving slowly and progressing toward goals  []      Not making progress toward goals and plan of care will be adjusted     PLAN:  Patient continues to benefit from skilled intervention to address the above impairments. Continue treatment per established plan of care.   Discharge Recommendations:  Home Health  Further Equipment Recommendations for Discharge:  ? O2 at home     SUBJECTIVE:   Patient stated I already saw one therapist.    OBJECTIVE DATA SUMMARY:   Critical Behavior:  Neurologic State: Alert, Appropriate for age  Orientation Level: Oriented X4  Cognition: Appropriate decision making, Follows commands  Safety/Judgement: Awareness of environment, Fall prevention  Functional Mobility Training:  Transfers:  Sit to Stand: Supervision  Stand to Sit: Supervision;Stand-by assistance  Bed to Chair: Supervision;Stand-by assistance  Balance:  Sitting: Intact  Standing: Intact; With support  Standing - Static: Good  Standing - Dynamic : Good  Ambulation/Gait Training:  Distance (ft): 80 Feet (ft)(standing rest half way)  Assistive Device: Gait belt;Walker, rolling(with O2 in place)  Ambulation - Level of Assistance: Contact guard assistance  Gait Abnormalities: Decreased step clearance  Base of Support: Widened  Speed/[place]: Slow  Step Length: Right shortened;Left shortened  Pain:  NAD/N c/o  Activity Tolerance:   Fair/fair- due to SOB  Please refer to the flowsheet for vital signs taken during this treatment.   After treatment:   [x] Patient left in no apparent distress sitting up in chair  [] Patient left in no apparent distress in bed  [x] Call bell left within reach  [x] Nursing notified  [] Caregiver present  [] Bed alarm activated      Lisa Sanabria PT   Time Calculation: 25 mins

## 2020-06-17 NOTE — DISCHARGE SUMMARY
Discharge Summary    Patient: Vivian La MRN: [id number]  CSN: [id number]    YOB: 1945  Age: 66 y.o.   Sex: male    DOA: 6/13/2020 LOS:  LOS: 4 days   Discharge Date:      Primary Care Provider:  Jose Schofield MD    Admission Diagnoses: Hyponatremia [E87.1]    Discharge Diagnoses:    Problem List as of 6/17/2020 Date Reviewed: 6/13/2020          Codes Class Noted - Resolved    Advanced care planning/counseling discussion ICD-10-CM: Z71.89  ICD-9-CM: V65.49  Unknown - Present        Hypokalemia ICD-10-CM: E87.6  ICD-9-CM: 276.8  4/14/2020 - Present        Hyponatremia ICD-10-CM: E87.1  ICD-9-CM: 276.1  4/10/2020 - Present        * (Principal) COPD with acute exacerbation ([facility] ICD-10-CM: J44.1  ICD-9-CM: 491.21  4/9/2020 - Present        Acute respiratory failure with hypoxia and hypercarbia ([facility] ICD-10-CM: J96.01, J96.02  ICD-9-CM: 518.81  4/9/2020 - Present        Pleural effusion, right ICD-10-CM: J90  ICD-9-CM: 511.9  2/22/2020 - Present        Tobacco dependence ICD-10-CM: F17.200  ICD-9-CM: 305.1  2/21/2020 - Present        Hyponatremia ICD-10-CM: E87.1  ICD-9-CM: 276.1  2/21/2020 - Present        Paroxysmal atrial fibrillation ([facility] ICD-10-CM: I48.0  ICD-9-CM: 427.31  8/19/2019 - Present        Chronic respiratory failure with hypoxia ([facility] ICD-10-CM: J96.11  ICD-9-CM: 518.83, 799.02  8/19/2019 - Present        Debility ICD-10-CM: R53.81  ICD-9-CM: 799.3  7/8/2019 - Present        Acute exacerbation of chronic obstructive pulmonary disease (COPD) ([facility] ICD-10-CM: J44.1  ICD-9-CM: 491.21  2/8/2019 - Present        Asbestosis ([facility] ICD-10-CM: G14  ICD-9-CM: 497  10/19/2018 - Present        Chronic renal disease, stage 3, moderately decreased glomerular filtration rate between 30-59 mL/min/1.73 square meter (HCC) ICD-10-CM: N18.3  ICD-9-CM: 585.3  7/20/2018 - Present        COPD (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 ([facility] ICD-10-CM: J44.9  ICD-9-CM: 491.20 4/20/2018 - Present        Hypertension ICD-10-CM: I10  ICD-9-CM: 401.9  Unknown - Present        RESOLVED: Acute on chronic respiratory failure with hypoxia and hypercapnia (HCC) ICD-10-CM: J96.21, J96.22  ICD-9-CM: 518.84, 786.09, 799.02  10/30/2019 - 2/25/2020        RESOLVED: COPD with asthma and status asthmaticus ([facility] ICD-10-CM: J44.9, J45.902  ICD-9-CM: 493.21  7/20/2018 - 2/8/2019        RESOLVED: Status asthmaticus with COPD (chronic obstructive pulmonary disease) ([facility] ICD-10-CM: J44.9, J45.902  ICD-9-CM: 493.21  4/20/2018 - 2/8/2019        RESOLVED: PVC's (premature ventricular contractions) ICD-10-CM: I49.3  ICD-9-CM: 427.69  4/20/2018 - 2/8/2019        RESOLVED: Acute respiratory failure ([facility] ICD-10-CM: J96.00  ICD-9-CM: 518.81  10/2/2014 - 3/18/2017        RESOLVED: URIEL (acute kidney injury) ([facility] ICD-10-CM: N17.9  ICD-9-CM: 584.9  10/2/2014 - 2/8/2019        RESOLVED: Pneumonia ICD-10-CM: J18.9  ICD-9-CM: 782  10/2/2014 - 3/18/2017              Discharge Medications:     Current Discharge Medication List      START taking these medications    Details   doxycycline (MONODOX) 100 mg capsule Take 1 Cap by mouth two (2) times a day for 10 days. Qty: 20 Cap, Refills: 0         CONTINUE these medications which have CHANGED    Details   predniSONE (STERAPRED DS) 10 mg dose pack See administration instruction per 10mg dose pack  Qty: 21 Tab, Refills: 0         CONTINUE these medications which have NOT CHANGED    Details   albuterol (ACCUNEB) 1.25 mg/3 mL nebu Take 3 mL by inhalation every four (4) hours as needed for Wheezing (wheezing). Qty: 25 Each, Refills: 0      dilTIAZem (CARDIZEM) 30 mg tablet Take 1 Tab by mouth Before breakfast, lunch, and dinner. Qty: 90 Tab, Refills: 0      chlorthalidone (HYGROTEN) 25 mg tablet Take 25 mg by mouth daily. acetaminophen (TYLENOL) 325 mg tablet Take 500 mg by mouth every four (4) hours as needed for Pain.       OXYGEN-AIR DELIVERY SYSTEMS Take 2 L by inhalation continuous. dextran 70/hypromellose (ARTIFICIAL TEARS, PF, OP) Apply 2 Drops to eye as needed for Other (both eyes for dryness). diphenhydrAMINE (BENADRYL) 25 mg capsule Take 25 mg by mouth nightly as needed for Itching. albuterol (PROVENTIL HFA, VENTOLIN HFA, PROAIR HFA) 90 mcg/actuation inhaler Take 2 Puffs by inhalation every four (4) hours as needed for Wheezing or Shortness of Breath. Qty: 1 Inhaler, Refills: 1      ipratropium (ATROVENT) 0.03 % nasal spray 2 Sprays by Both Nostrils route every twelve (12) hours as needed for Rhinitis. tamsulosin (FLOMAX) 0.4 mg capsule Take 0.4 mg by mouth every evening. STOP taking these medications       albuterol sulfate (PROAIR RESPICLICK) 90 mcg/actuation breath activated inhaler Comments:   Reason for Stopping:         albuterol-ipratropium (DUO-NEB) 2.5 mg-0.5 mg/3 ml nebu Comments:   Reason for Stopping:               Discharge Condition: improved     Procedures : NONE    Consults: NONE    PHYSICAL EXAM   Visit Vitals  /54   Pulse 73   Temp 98.5 °F (36.9 °C)   Resp 19   Ht 5' 8\" (1.727 m)   Wt 96.2 kg (212 lb)   SpO2 98%   BMI 32.23 kg/m²     General: Awake, cooperative, no acute distress    HEENT: NC, Atraumatic. PERRLA, EOMI. Anicteric sclerae. Lungs:  CTA Bilaterally. No Wheezing/Rhonchi/Rales. Heart:  Regular  rhythm,  No murmur, No Rubs, No Gallops  Abdomen: Soft, Non distended, Non tender. +Bowel sounds,   Extremities: No c/c/e  Psych:   Not anxious or agitated. Neurologic:  No acute neurological deficits. Admission HPI :     Tam Bear is a 66 y.o.  male who has history of COPD on home oxygen 3 L,Hypertension, brain aneurysm presents with worsening shortness of breath over the past week despite using nebulizing treatments increased oxygen in the emergency room he was given 3-4 rounds of DuoNeb magnesium and steroids with no improvement.   He is noted to have difficulty clearing secretions and mucus being taken last admit for COPD exacerbation patient denies any COVID-19 exposures or recent travel he does have chronic lower extremity edema duplex done in the emergency room was negative for DVT. Hospital Course :     Acute COPD exacerbation-CXR clear, placed on steroids which were successfully weaned and nebs PRN     Chronic resp failure with chronic 02 dependence-required to baseline requirement     Acute bronchitis -  Sputum culture from [place] laboratory shows MRSA  Briefly spoke with infectious disease physician Dr. Hermelindo Gonzalez, mentions repeat chest x-ray make sure no obvious new changes if not discharge patient home on doxycycline  CXR did not show any significant change, DC pt home on doxycycline.      Hyponatremia-resolved  Paroxysmal A fib-stable rate, off eliquis PTA  Asbestosis -stable  Hypertension-stable  CKD stage 3 -stable  Hypokalemia- resolved PO Kdur     Echo result shows nl EF  MBS was neg for aspiration  Wean steroids  DVT prev-lovenox     Patient's wife made arrangements to transport patient home.      Activity: as tolerated     Diet: cardiac diet     Follow-up: with PCP    Disposition: Home with wife. Minutes spent on discharge: 35 minutes       Labs: Results:       Chemistry Recent Labs     06/17/20 0248 06/16/20 0205 06/15/20  0458   * 132* 139*    135* 133*   K 3.7 3.3* 3.6   CL 99* 98* 97*   CO2 30 28 27   BUN 33* 34* 28*   CREA 1.33* 1.33* 1.26   CA 7.7* 7.6* 8.2*   AGAP 7 9 9   BUCR 25* 26* 22*   AP 58 65 74   TP 5.9* 5.6* 6.1*   ALB 2.8* 2.7* 2.9*   GLOB 3.1 2.9 3.2   AGRAT 0.9 0.9 0.9      CBC w/Diff Recent Labs     06/17/20 0248 06/16/20 0205 06/15/20  0458   WBC 6.5 8.2 9.7   RBC 3.62* 3.51* 3.65*   HGB 10.3* 10.0* 10.4*   HCT 31.8* 31.0* 31.5*    326 342   GRANS 88* 85* 91*   LYMPH 5* 6* 5*   EOS 0 0 0      Cardiac Enzymes No results for input(s): CPK, CKND1, WILLARD in the last 72 hours.     No lab exists for component: CKRMB, TROIP   Coagulation No results for input(s): PTP, INR, APTT, INREXT in the last 72 hours. Lipid Panel No results found for: CHOL, CHOLPOCT, CHOLX, CHLST, CHOLV, 316374, HDL, HDLP, LDL, LDLC, DLDLP, 297048, VLDLC, VLDL, TGLX, TRIGL, TRIGP, TGLPOCT, CHHD, CHHDX   BNP No results for input(s): BNPP in the last 72 hours. Liver Enzymes Recent Labs     06/17/20  0248   TP 5.9*   ALB 2.8*   AP 58      Thyroid Studies Lab Results   Component Value Date/Time    TSH 0.86 10/20/2019 01:05 AM            Significant Diagnostic Studies: Xr Swallow Func Video    Result Date: 6/15/2020  VIDEOSWALLOW WITH CINEFLUOROGRAPHY: HISTORY:  sob. Dysphagia. Feeding difficulty. PROCEDURE AND FINDINGS: Examination was performed in conjunction with the speech therapy department. The patient was presented with all consistencies of food without aspiration or laryngeal penetration. There is satisfactory oromotor function and pharyngeal motility. Fluoroscopic time: 42 seconds Fluoroscopic dose (reference air kerma): 2.63 mGy     IMPRESSION: No aspiration. Xr Chest Port    Result Date: 6/17/2020  EXAM: XR CHEST PORT. CLINICAL INDICATION/HISTORY: bronchitis -any interval change. -Additional: None. TECHNIQUE: A portable erect AP radiographic view of the chest is compared with the radiographs of 06/13/2020. _______________ FINDINGS: See impression. No pneumothorax. The cardiac silhouette, wagner, and mediastinal contours are normal for age. No acute osseous abnormality is revealed. _______________     IMPRESSION: New apparent mild increased groundglass attenuation of the right lung relative to the left may be artifactual due to portable technique and minor patient rotation, although developing asymmetric pulmonary edema cannot be excluded noting minor interstitial prominence on the left as well.  Calcified pleural plaques on the right are stable. _______________     Hemal Mas Chest Port    Result Date: 6/13/2020  EXAM: XR CHEST PORT CLINICAL INDICATION/HISTORY: SOB -Additional: None COMPARISON: 5/13/2020 TECHNIQUE: Portable frontal view of the chest _______________ FINDINGS: SUPPORT DEVICES: None. HEART AND MEDIASTINUM: Cardiomediastinal silhouette within normal limits. LUNGS AND PLEURAL SPACES: Right pleural plaque. Bibasilar atelectasis. No dense consolidation, large effusion or pneumothorax. _______________     IMPRESSION: No acute cardiopulmonary abnormality. Ct Low Dose Lung Cancer Screening    Result Date: 6/1/2020  EXAM: CT Chest, lung cancer screening CLINICAL INDICATION/HISTORY: Lung cancer screening, cigarette/nicotine dependence. COMPARISON: CTA of the chest 10/31/2019. TECHNIQUE: Chest CT from thoracic inlet through the diaphragm without contrast. A low-dose lung cancer screening protocol was performed. Note that visualization of non-pulmonary soft tissue structures is limited with this protocol. Coronal and sagittal reformats were generated and reviewed. One or more dose reduction techniques were used on this CT: automated exposure control, adjustment of the mAs and/or kVp according to patient size, and iterative reconstruction techniques. The specific techniques used on this CT exam have been documented in the patient's electronic medical record. Digital Imaging and Communications in Medicine (DICOM) format image data are available to nonaffiliated external healthcare facilities or entities on a secure, media free, reciprocally searchable basis with patient authorization for at least a 12-month period after this study. DLP: 67 _______________ FINDINGS: LUNGS: Nodules: No suspicious nodule. Other pulmonary findings: Focally calcified area of soft tissue thickening along the right pleural margin both anteriorly and posteriorly is unchanged in appearance since prior examination. OTHER: Coronary artery ossifications are present. _______________     IMPRESSION: No suspicious pulmonary nodule.  Focal areas of peripheral pleural thickening with peripheral calcification is unchanged in appearance since prior examinations. Lung-RADS 1 - Negative. Management: Continue annual screening with low dose CT in 12 months. Lung cancer screening categorization and recommendations per the [employer] of radiology Lung-RADS version 1.1. No results found for this or any previous visit.         CC: Dora Bone MD

## 2020-06-18 ENCOUNTER — PATIENT OUTREACH (OUTPATIENT)
Dept: CASE MANAGEMENT | Age: 75
End: 2020-06-18

## 2020-06-18 NOTE — PROGRESS NOTES
Patient contacted regarding COVID-19  risk. Discussed COVID-19 related testing which was not done at this time. Test results were not done. Patient informed of results, if available? N/A     Care Transition Nurse/ Ambulatory Care Manager contacted the patient by telephone to perform post discharge assessment. Verified name and  with patient as identifiers. Provided introduction to self, and explanation of the CTN/ACM role, and reason for call due to risk factors for infection and/or exposure to COVID-19. Patient gave verbal permission to speak with Vidya Cagle, wife as he is [address] Verified patient has new prescriptions and is taking them. Wife verbalized patient called PCP and cancelled follow up appt for  Today. Wife aware of pulm appt. Reviewed meds that have been changed and stopped. Symptoms reviewed with family who verbalized the following symptoms: no new symptoms and no worsening symptoms. Due to no new or worsening symptoms encounter was not routed to provider for escalation. Discussed follow-up appointments. If no appointment was previously scheduled, appointment scheduling offered: N/A  [facility] follow up appointment(s): No future appointments. [facility] follow up appointment(s): Dr. Loyda Putnam, pulmonology 20 @ 11:30 AM      Patient has following risk factors of: COPD, chronic kidney disease and age. CTN/ACM reviewed discharge instructions, medical action plan and red flags such as increased shortness of breath, increasing fever and signs of decompensation with family who verbalized understanding. Discussed exposure protocols and quarantine with CDC Guidelines What to do if you are sick with coronavirus disease .  Family was given an opportunity for questions and concerns. The family agrees to contact the [facility] exposure line [phone], [facility] department [address]  ([phone]) and PCP office for questions related to their healthcare.  CTN/ACM provided contact information for future needs. Reviewed and educated family on any new and changed medications related to discharge diagnosis. Patient/family/caregiver given information for [employer] and agrees to enroll no      Plan for follow-up call in 5-7 days based on severity of symptoms and risk factors.

## 2020-06-19 ENCOUNTER — HOME CARE VISIT (OUTPATIENT)
Dept: HOME HEALTH SERVICES | Facility: HOME HEALTH | Age: 75
End: 2020-06-19

## 2020-06-19 LAB
BACTERIA SPEC CULT: NORMAL
SERVICE CMNT-IMP: NORMAL

## 2020-06-21 ENCOUNTER — HOME CARE VISIT (OUTPATIENT)
Dept: HOME HEALTH SERVICES | Facility: HOME HEALTH | Age: 75
End: 2020-06-21

## 2020-06-24 ENCOUNTER — HOME CARE VISIT (OUTPATIENT)
Dept: HOME HEALTH SERVICES | Facility: HOME HEALTH | Age: 75
End: 2020-06-24

## 2020-06-26 ENCOUNTER — PATIENT OUTREACH (OUTPATIENT)
Dept: CASE MANAGEMENT | Age: 75
End: 2020-06-26

## 2020-06-26 NOTE — PROGRESS NOTES
Patient contacted regarding COVID-19 risk and screening. COVID-19 related testing which was not done at this time. Test results were not done. Patient informed of results, if available? N/A     No answer. Left message introducing self, role and reason for call. Requested return call. Contact information provided. Will attempt to contact at a later time.

## 2020-07-15 ENCOUNTER — PATIENT OUTREACH (OUTPATIENT)
Dept: CASE MANAGEMENT | Age: 75
End: 2020-07-15

## 2020-07-15 NOTE — PROGRESS NOTES
Patient resolved from Transition of Care episode on 7/15/20. COVID-19 related testing which was not done at this time. Test results were not done. Patient informed of results, if available? N/A     Contacted patient for follow up. No answer. Left message  provided the following resources and education related to COVID-19:                         Signs, symptoms and red flags related to COVID-19            CDC exposure and quarantine guidelines            [facility] exposure contact - [phone]            Contact for their local Department of Health                  No further outreach scheduled with this CTN/ACM/LPN/HC/ MA. Episode of Care resolved. Patient has this CTN/ACM/LPN/HC/MA contact information if future needs arise.

## 2020-08-04 ENCOUNTER — APPOINTMENT (OUTPATIENT)
Dept: GENERAL RADIOLOGY | Age: 75
DRG: 190 | End: 2020-08-04
Attending: EMERGENCY MEDICINE
Payer: MEDICARE

## 2020-08-04 ENCOUNTER — HOSPITAL ENCOUNTER (INPATIENT)
Age: 75
LOS: 5 days | Discharge: HOME HEALTH | DRG: 190 | End: 2020-08-10
Attending: EMERGENCY MEDICINE | Admitting: FAMILY MEDICINE
Payer: MEDICARE

## 2020-08-04 DIAGNOSIS — J96.21 ACUTE ON CHRONIC RESPIRATORY FAILURE WITH HYPOXIA AND HYPERCAPNIA (HCC): ICD-10-CM

## 2020-08-04 DIAGNOSIS — Z71.89 ADVANCED CARE PLANNING/COUNSELING DISCUSSION: ICD-10-CM

## 2020-08-04 DIAGNOSIS — J44.9 CHRONIC OBSTRUCTIVE PULMONARY DISEASE, UNSPECIFIED COPD TYPE (HCC): Primary | ICD-10-CM

## 2020-08-04 DIAGNOSIS — J96.22 ACUTE ON CHRONIC RESPIRATORY FAILURE WITH HYPOXIA AND HYPERCAPNIA (HCC): ICD-10-CM

## 2020-08-04 DIAGNOSIS — R53.81 DEBILITY: ICD-10-CM

## 2020-08-04 DIAGNOSIS — J44.1 ACUTE EXACERBATION OF CHRONIC OBSTRUCTIVE PULMONARY DISEASE (COPD) (HCC): ICD-10-CM

## 2020-08-04 DIAGNOSIS — J18.9 PNEUMONIA DUE TO INFECTIOUS ORGANISM, UNSPECIFIED LATERALITY, UNSPECIFIED PART OF LUNG: ICD-10-CM

## 2020-08-04 LAB
ALBUMIN SERPL-MCNC: 3.3 G/DL (ref 3.4–5)
ALBUMIN/GLOB SERPL: 1 {RATIO} (ref 0.8–1.7)
ALP SERPL-CCNC: 98 U/L (ref 45–117)
ALT SERPL-CCNC: 10 U/L (ref 16–61)
ANION GAP SERPL CALC-SCNC: 9 MMOL/L (ref 3–18)
AST SERPL-CCNC: 16 U/L (ref 10–38)
ATRIAL RATE: 104 BPM
BASOPHILS # BLD: 0 K/UL (ref 0–0.1)
BASOPHILS NFR BLD: 0 % (ref 0–3)
BILIRUB SERPL-MCNC: 0.6 MG/DL (ref 0.2–1)
BNP SERPL-MCNC: 79 PG/ML (ref 0–1800)
BUN SERPL-MCNC: 28 MG/DL (ref 7–18)
BUN/CREAT SERPL: 19 (ref 12–20)
CALCIUM SERPL-MCNC: 8.3 MG/DL (ref 8.5–10.1)
CALCULATED P AXIS, ECG09: 65 DEGREES
CALCULATED R AXIS, ECG10: 46 DEGREES
CALCULATED T AXIS, ECG11: 74 DEGREES
CHLORIDE SERPL-SCNC: 94 MMOL/L (ref 100–111)
CK MB CFR SERPL CALC: 3.2 % (ref 0–4)
CK MB SERPL-MCNC: 3.8 NG/ML (ref 5–25)
CK SERPL-CCNC: 118 U/L (ref 39–308)
CO2 SERPL-SCNC: 27 MMOL/L (ref 21–32)
CREAT SERPL-MCNC: 1.51 MG/DL (ref 0.6–1.3)
DIAGNOSIS, 93000: NORMAL
DIFFERENTIAL METHOD BLD: ABNORMAL
EOSINOPHIL # BLD: 1.4 K/UL (ref 0–0.4)
EOSINOPHIL NFR BLD: 8 % (ref 0–5)
ERYTHROCYTE [DISTWIDTH] IN BLOOD BY AUTOMATED COUNT: 13.7 % (ref 11.6–14.5)
GLOBULIN SER CALC-MCNC: 3.2 G/DL (ref 2–4)
GLUCOSE SERPL-MCNC: 136 MG/DL (ref 74–99)
HCT VFR BLD AUTO: 33.2 % (ref 36–48)
HGB BLD-MCNC: 10.7 G/DL (ref 13–16)
LACTATE SERPL-SCNC: 1.3 MMOL/L (ref 0.4–2)
LYMPHOCYTES # BLD: 0.5 K/UL (ref 0.8–3.5)
LYMPHOCYTES NFR BLD: 3 % (ref 20–51)
MCH RBC QN AUTO: 28.5 PG (ref 24–34)
MCHC RBC AUTO-ENTMCNC: 32.2 G/DL (ref 31–37)
MCV RBC AUTO: 88.3 FL (ref 74–97)
MONOCYTES # BLD: 1.4 K/UL (ref 0–1)
MONOCYTES NFR BLD: 8 % (ref 2–9)
NEUTS SEG # BLD: 14.1 K/UL (ref 1.8–8)
NEUTS SEG NFR BLD: 81 % (ref 42–75)
P-R INTERVAL, ECG05: 150 MS
PLATELET # BLD AUTO: 319 K/UL (ref 135–420)
PLATELET COMMENTS,PCOM: ABNORMAL
PMV BLD AUTO: 8.7 FL (ref 9.2–11.8)
POTASSIUM SERPL-SCNC: 3.2 MMOL/L (ref 3.5–5.5)
PROT SERPL-MCNC: 6.5 G/DL (ref 6.4–8.2)
Q-T INTERVAL, ECG07: 364 MS
QRS DURATION, ECG06: 80 MS
QTC CALCULATION (BEZET), ECG08: 478 MS
RBC # BLD AUTO: 3.76 M/UL (ref 4.7–5.5)
RBC MORPH BLD: ABNORMAL
SODIUM SERPL-SCNC: 130 MMOL/L (ref 136–145)
TROPONIN I SERPL-MCNC: <0.02 NG/ML (ref 0–0.04)
VENTRICULAR RATE, ECG03: 104 BPM
WBC # BLD AUTO: 17.4 K/UL (ref 4.6–13.2)

## 2020-08-04 PROCEDURE — 71045 X-RAY EXAM CHEST 1 VIEW: CPT

## 2020-08-04 PROCEDURE — 83880 ASSAY OF NATRIURETIC PEPTIDE: CPT

## 2020-08-04 PROCEDURE — 93005 ELECTROCARDIOGRAM TRACING: CPT

## 2020-08-04 PROCEDURE — 82550 ASSAY OF CK (CPK): CPT

## 2020-08-04 PROCEDURE — 99285 EMERGENCY DEPT VISIT HI MDM: CPT

## 2020-08-04 PROCEDURE — 83605 ASSAY OF LACTIC ACID: CPT

## 2020-08-04 PROCEDURE — 87040 BLOOD CULTURE FOR BACTERIA: CPT

## 2020-08-04 PROCEDURE — 80053 COMPREHEN METABOLIC PANEL: CPT

## 2020-08-04 PROCEDURE — 85025 COMPLETE CBC W/AUTO DIFF WBC: CPT

## 2020-08-04 RX ORDER — IPRATROPIUM BROMIDE AND ALBUTEROL SULFATE 2.5; .5 MG/3ML; MG/3ML
3 SOLUTION RESPIRATORY (INHALATION)
Status: COMPLETED | OUTPATIENT
Start: 2020-08-04 | End: 2020-08-05

## 2020-08-05 PROBLEM — J18.9 CAP (COMMUNITY ACQUIRED PNEUMONIA): Status: ACTIVE | Noted: 2020-08-05

## 2020-08-05 PROBLEM — J44.9 COPD (CHRONIC OBSTRUCTIVE PULMONARY DISEASE) (HCC): Status: ACTIVE | Noted: 2020-08-05

## 2020-08-05 LAB
ANION GAP SERPL CALC-SCNC: 7 MMOL/L (ref 3–18)
APPEARANCE UR: CLEAR
BILIRUB UR QL: NEGATIVE
BUN SERPL-MCNC: 24 MG/DL (ref 7–18)
BUN/CREAT SERPL: 19 (ref 12–20)
CALCIUM SERPL-MCNC: 8.5 MG/DL (ref 8.5–10.1)
CHLORIDE SERPL-SCNC: 94 MMOL/L (ref 100–111)
CO2 SERPL-SCNC: 28 MMOL/L (ref 21–32)
COLOR UR: YELLOW
CREAT SERPL-MCNC: 1.29 MG/DL (ref 0.6–1.3)
ERYTHROCYTE [DISTWIDTH] IN BLOOD BY AUTOMATED COUNT: 13.8 % (ref 11.6–14.5)
GLUCOSE SERPL-MCNC: 169 MG/DL (ref 74–99)
GLUCOSE UR STRIP.AUTO-MCNC: NEGATIVE MG/DL
HCT VFR BLD AUTO: 34 % (ref 36–48)
HGB BLD-MCNC: 10.8 G/DL (ref 13–16)
HGB UR QL STRIP: NEGATIVE
KETONES UR QL STRIP.AUTO: NEGATIVE MG/DL
LEUKOCYTE ESTERASE UR QL STRIP.AUTO: NEGATIVE
MCH RBC QN AUTO: 28.4 PG (ref 24–34)
MCHC RBC AUTO-ENTMCNC: 31.8 G/DL (ref 31–37)
MCV RBC AUTO: 89.5 FL (ref 74–97)
NITRITE UR QL STRIP.AUTO: NEGATIVE
PH UR STRIP: 5.5 [PH] (ref 5–8)
PLATELET # BLD AUTO: 324 K/UL (ref 135–420)
PMV BLD AUTO: 8.9 FL (ref 9.2–11.8)
POTASSIUM SERPL-SCNC: 3.8 MMOL/L (ref 3.5–5.5)
PROT UR STRIP-MCNC: NEGATIVE MG/DL
RBC # BLD AUTO: 3.8 M/UL (ref 4.7–5.5)
SODIUM SERPL-SCNC: 129 MMOL/L (ref 136–145)
SP GR UR REFRACTOMETRY: 1.01 (ref 1–1.03)
UROBILINOGEN UR QL STRIP.AUTO: 1 EU/DL (ref 0.2–1)
WBC # BLD AUTO: 15.7 K/UL (ref 4.6–13.2)

## 2020-08-05 PROCEDURE — 36415 COLL VENOUS BLD VENIPUNCTURE: CPT

## 2020-08-05 PROCEDURE — 74011000250 HC RX REV CODE- 250: Performed by: EMERGENCY MEDICINE

## 2020-08-05 PROCEDURE — 94640 AIRWAY INHALATION TREATMENT: CPT

## 2020-08-05 PROCEDURE — 96375 TX/PRO/DX INJ NEW DRUG ADDON: CPT

## 2020-08-05 PROCEDURE — 97167 OT EVAL HIGH COMPLEX 60 MIN: CPT

## 2020-08-05 PROCEDURE — 81003 URINALYSIS AUTO W/O SCOPE: CPT

## 2020-08-05 PROCEDURE — 80048 BASIC METABOLIC PNL TOTAL CA: CPT

## 2020-08-05 PROCEDURE — 77030013140 HC MSK NEB VYRM -A

## 2020-08-05 PROCEDURE — 65660000000 HC RM CCU STEPDOWN

## 2020-08-05 PROCEDURE — 74011250636 HC RX REV CODE- 250/636: Performed by: FAMILY MEDICINE

## 2020-08-05 PROCEDURE — 97163 PT EVAL HIGH COMPLEX 45 MIN: CPT

## 2020-08-05 PROCEDURE — 96374 THER/PROPH/DIAG INJ IV PUSH: CPT

## 2020-08-05 PROCEDURE — 85027 COMPLETE CBC AUTOMATED: CPT

## 2020-08-05 PROCEDURE — 74011000250 HC RX REV CODE- 250: Performed by: FAMILY MEDICINE

## 2020-08-05 PROCEDURE — 74011250636 HC RX REV CODE- 250/636: Performed by: EMERGENCY MEDICINE

## 2020-08-05 PROCEDURE — 74011250636 HC RX REV CODE- 250/636: Performed by: HOSPITALIST

## 2020-08-05 PROCEDURE — 74011250637 HC RX REV CODE- 250/637: Performed by: FAMILY MEDICINE

## 2020-08-05 PROCEDURE — 97535 SELF CARE MNGMENT TRAINING: CPT

## 2020-08-05 PROCEDURE — 77010033678 HC OXYGEN DAILY

## 2020-08-05 RX ORDER — BISACODYL 5 MG
5 TABLET, DELAYED RELEASE (ENTERIC COATED) ORAL DAILY PRN
Status: DISCONTINUED | OUTPATIENT
Start: 2020-08-05 | End: 2020-08-10 | Stop reason: HOSPADM

## 2020-08-05 RX ORDER — SODIUM CHLORIDE 0.9 % (FLUSH) 0.9 %
5-40 SYRINGE (ML) INJECTION EVERY 8 HOURS
Status: DISCONTINUED | OUTPATIENT
Start: 2020-08-05 | End: 2020-08-10 | Stop reason: HOSPADM

## 2020-08-05 RX ORDER — MORPHINE SULFATE 2 MG/ML
2 INJECTION, SOLUTION INTRAMUSCULAR; INTRAVENOUS
Status: DISCONTINUED | OUTPATIENT
Start: 2020-08-05 | End: 2020-08-10 | Stop reason: HOSPADM

## 2020-08-05 RX ORDER — SODIUM CHLORIDE 0.9 % (FLUSH) 0.9 %
5-40 SYRINGE (ML) INJECTION AS NEEDED
Status: DISCONTINUED | OUTPATIENT
Start: 2020-08-05 | End: 2020-08-10 | Stop reason: HOSPADM

## 2020-08-05 RX ORDER — OXYCODONE HYDROCHLORIDE 5 MG/1
5 TABLET ORAL
Status: DISCONTINUED | OUTPATIENT
Start: 2020-08-05 | End: 2020-08-10 | Stop reason: HOSPADM

## 2020-08-05 RX ORDER — CARBOXYMETHYLCELLULOSE SODIUM 5 MG/ML
2 SOLUTION/ DROPS OPHTHALMIC AS NEEDED
Status: DISCONTINUED | OUTPATIENT
Start: 2020-08-05 | End: 2020-08-10 | Stop reason: HOSPADM

## 2020-08-05 RX ORDER — HEPARIN SODIUM 5000 [USP'U]/ML
5000 INJECTION, SOLUTION INTRAVENOUS; SUBCUTANEOUS EVERY 8 HOURS
Status: DISCONTINUED | OUTPATIENT
Start: 2020-08-05 | End: 2020-08-10 | Stop reason: HOSPADM

## 2020-08-05 RX ORDER — ONDANSETRON 2 MG/ML
4 INJECTION INTRAMUSCULAR; INTRAVENOUS
Status: DISCONTINUED | OUTPATIENT
Start: 2020-08-05 | End: 2020-08-10 | Stop reason: HOSPADM

## 2020-08-05 RX ORDER — SODIUM CHLORIDE 9 MG/ML
75 INJECTION, SOLUTION INTRAVENOUS CONTINUOUS
Status: DISPENSED | OUTPATIENT
Start: 2020-08-05 | End: 2020-08-06

## 2020-08-05 RX ORDER — DILTIAZEM HYDROCHLORIDE 30 MG/1
30 TABLET, FILM COATED ORAL
Status: DISCONTINUED | OUTPATIENT
Start: 2020-08-05 | End: 2020-08-10 | Stop reason: HOSPADM

## 2020-08-05 RX ORDER — LORAZEPAM 2 MG/ML
1 INJECTION INTRAMUSCULAR
Status: DISCONTINUED | OUTPATIENT
Start: 2020-08-05 | End: 2020-08-10 | Stop reason: HOSPADM

## 2020-08-05 RX ORDER — CHLORTHALIDONE 25 MG/1
25 TABLET ORAL DAILY
Status: DISCONTINUED | OUTPATIENT
Start: 2020-08-05 | End: 2020-08-10 | Stop reason: HOSPADM

## 2020-08-05 RX ORDER — ACETAMINOPHEN 325 MG/1
650 TABLET ORAL
Status: DISCONTINUED | OUTPATIENT
Start: 2020-08-05 | End: 2020-08-10 | Stop reason: HOSPADM

## 2020-08-05 RX ORDER — NALOXONE HYDROCHLORIDE 0.4 MG/ML
0.4 INJECTION, SOLUTION INTRAMUSCULAR; INTRAVENOUS; SUBCUTANEOUS AS NEEDED
Status: DISCONTINUED | OUTPATIENT
Start: 2020-08-05 | End: 2020-08-10 | Stop reason: HOSPADM

## 2020-08-05 RX ORDER — BUDESONIDE 0.5 MG/2ML
500 INHALANT ORAL
Status: DISCONTINUED | OUTPATIENT
Start: 2020-08-05 | End: 2020-08-10 | Stop reason: HOSPADM

## 2020-08-05 RX ORDER — IPRATROPIUM BROMIDE AND ALBUTEROL SULFATE 2.5; .5 MG/3ML; MG/3ML
3 SOLUTION RESPIRATORY (INHALATION)
Status: DISCONTINUED | OUTPATIENT
Start: 2020-08-05 | End: 2020-08-06

## 2020-08-05 RX ORDER — TAMSULOSIN HYDROCHLORIDE 0.4 MG/1
0.4 CAPSULE ORAL EVERY EVENING
Status: DISCONTINUED | OUTPATIENT
Start: 2020-08-05 | End: 2020-08-10 | Stop reason: HOSPADM

## 2020-08-05 RX ADMIN — CHLORTHALIDONE 25 MG: 25 TABLET ORAL at 08:47

## 2020-08-05 RX ADMIN — HEPARIN SODIUM 5000 UNITS: 5000 INJECTION INTRAVENOUS; SUBCUTANEOUS at 20:22

## 2020-08-05 RX ADMIN — HEPARIN SODIUM 5000 UNITS: 5000 INJECTION INTRAVENOUS; SUBCUTANEOUS at 12:24

## 2020-08-05 RX ADMIN — IPRATROPIUM BROMIDE AND ALBUTEROL SULFATE 3 ML: .5; 3 SOLUTION RESPIRATORY (INHALATION) at 00:15

## 2020-08-05 RX ADMIN — BUDESONIDE 500 MCG: 0.5 INHALANT RESPIRATORY (INHALATION) at 20:23

## 2020-08-05 RX ADMIN — IPRATROPIUM BROMIDE AND ALBUTEROL SULFATE 3 ML: .5; 3 SOLUTION RESPIRATORY (INHALATION) at 07:30

## 2020-08-05 RX ADMIN — METHYLPREDNISOLONE SODIUM SUCCINATE 60 MG: 125 INJECTION, POWDER, FOR SOLUTION INTRAMUSCULAR; INTRAVENOUS at 12:24

## 2020-08-05 RX ADMIN — METHYLPREDNISOLONE SODIUM SUCCINATE 60 MG: 125 INJECTION, POWDER, FOR SOLUTION INTRAMUSCULAR; INTRAVENOUS at 06:20

## 2020-08-05 RX ADMIN — IPRATROPIUM BROMIDE AND ALBUTEROL SULFATE 3 ML: .5; 3 SOLUTION RESPIRATORY (INHALATION) at 18:44

## 2020-08-05 RX ADMIN — DILTIAZEM HYDROCHLORIDE 30 MG: 30 TABLET, FILM COATED ORAL at 18:38

## 2020-08-05 RX ADMIN — BUDESONIDE 500 MCG: 0.5 INHALANT RESPIRATORY (INHALATION) at 07:30

## 2020-08-05 RX ADMIN — Medication 10 ML: at 06:21

## 2020-08-05 RX ADMIN — AZITHROMYCIN 500 MG: 500 INJECTION, POWDER, LYOPHILIZED, FOR SOLUTION INTRAVENOUS at 00:05

## 2020-08-05 RX ADMIN — DILTIAZEM HYDROCHLORIDE 30 MG: 30 TABLET, FILM COATED ORAL at 08:47

## 2020-08-05 RX ADMIN — SODIUM CHLORIDE 1000 ML: 900 INJECTION, SOLUTION INTRAVENOUS at 00:06

## 2020-08-05 RX ADMIN — HEPARIN SODIUM 5000 UNITS: 5000 INJECTION INTRAVENOUS; SUBCUTANEOUS at 06:21

## 2020-08-05 RX ADMIN — DILTIAZEM HYDROCHLORIDE 30 MG: 30 TABLET, FILM COATED ORAL at 12:24

## 2020-08-05 RX ADMIN — IPRATROPIUM BROMIDE AND ALBUTEROL SULFATE 3 ML: .5; 3 SOLUTION RESPIRATORY (INHALATION) at 20:23

## 2020-08-05 RX ADMIN — Medication 10 ML: at 18:38

## 2020-08-05 RX ADMIN — IPRATROPIUM BROMIDE AND ALBUTEROL SULFATE 3 ML: .5; 3 SOLUTION RESPIRATORY (INHALATION) at 00:14

## 2020-08-05 RX ADMIN — SODIUM CHLORIDE 75 ML/HR: 900 INJECTION, SOLUTION INTRAVENOUS at 12:24

## 2020-08-05 RX ADMIN — METHYLPREDNISOLONE SODIUM SUCCINATE 125 MG: 125 INJECTION, POWDER, FOR SOLUTION INTRAMUSCULAR; INTRAVENOUS at 00:04

## 2020-08-05 RX ADMIN — IPRATROPIUM BROMIDE AND ALBUTEROL SULFATE 3 ML: .5; 3 SOLUTION RESPIRATORY (INHALATION) at 00:04

## 2020-08-05 RX ADMIN — CEFTRIAXONE 2 G: 2 INJECTION, POWDER, FOR SOLUTION INTRAMUSCULAR; INTRAVENOUS at 00:04

## 2020-08-05 RX ADMIN — METHYLPREDNISOLONE SODIUM SUCCINATE 60 MG: 125 INJECTION, POWDER, FOR SOLUTION INTRAMUSCULAR; INTRAVENOUS at 18:38

## 2020-08-05 RX ADMIN — TAMSULOSIN HYDROCHLORIDE 0.4 MG: 0.4 CAPSULE ORAL at 18:38

## 2020-08-05 NOTE — PROGRESS NOTES
0730: Bedside and Verbal shift change report given to [address] (oncoming nurse) by Yudith Flower RN (offgoing nurse).  Report included the following information Kardex and Cardiac Rhythm NSR.     1030: MD Jennifer Ramirez made aware of pt status at this time, post MD review interventions given to this nurse for tx, matters to be addressed  Madison Marroquin RN    [phone]: pt up with PT, sits in chair at the bedside at this time  Madison Marroquin RN

## 2020-08-05 NOTE — PROGRESS NOTES
Problem: Falls - Risk of  Goal: *Absence of Falls  Description: Document Rebbecca Persons Fall Risk and appropriate interventions in the flowsheet. Outcome: Progressing Towards Goal  Note: Fall Risk Interventions:  Mobility Interventions: Communicate number of staff needed for ambulation/transfer, OT consult for ADLs, Patient to call before getting OOB, PT Consult for mobility concerns, Strengthening exercises (ROM-active/passive), PT Consult for assist device competence, Utilize walker, cane, or other assistive device         Medication Interventions: Evaluate medications/consider consulting pharmacy, Patient to call before getting OOB, Teach patient to arise slowly                   Problem: Patient Education: Go to Patient Education Activity  Goal: Patient/Family Education  Outcome: Progressing Towards Goal     Problem: Pressure Injury - Risk of  Goal: *Prevention of pressure injury  Description: Document Nikos Scale and appropriate interventions in the flowsheet. Outcome: Progressing Towards Goal  Note: Pressure Injury Interventions:  Sensory Interventions: Avoid rigorous massage over bony prominences, Assess need for specialty bed, Assess changes in LOC, Chair cushion, Check visual cues for pain, Float heels, Discuss PT/OT consult with provider, Keep linens dry and wrinkle-free, Maintain/enhance activity level, Minimize linen layers, Monitor skin under medical devices, Pad between skin to skin, Pressure redistribution bed/mattress (bed type), Turn and reposition approx. every two hours (pillows and wedges if needed)         Activity Interventions: Assess need for specialty bed, Chair cushion, Increase time out of bed, Pressure redistribution bed/mattress(bed type), PT/OT evaluation    Mobility Interventions: Assess need for specialty bed, Chair cushion, Float heels, Pressure redistribution bed/mattress (bed type), PT/OT evaluation, Turn and reposition approx.  every two hours(pillow and wedges)    Nutrition Interventions: Document food/fluid/supplement intake, Discuss nutritional consult with provider, Offer support with meals,snacks and hydration    Friction and Shear Interventions: Feet elevated on foot rest, Foam dressings/transparent film/skin sealants, Lift sheet, Lift team/patient mobility team, Minimize layers, Transferring/repositioning devices                Problem: Patient Education: Go to Patient Education Activity  Goal: Patient/Family Education  Outcome: Progressing Towards Goal     Problem: Pain  Goal: *Control of Pain  Outcome: Progressing Towards Goal  Goal: *PALLIATIVE CARE:  Alleviation of Pain  Outcome: Progressing Towards Goal     Problem: Patient Education: Go to Patient Education Activity  Goal: Patient/Family Education  Outcome: Progressing Towards Goal     Problem: Chronic Obstructive Pulmonary Disease (COPD)  Goal: *Oxygen saturation during activity within specified parameters  Outcome: Progressing Towards Goal  Goal: *Able to remain out of bed as prescribed  Outcome: Progressing Towards Goal  Goal: *Absence of hypoxia  Outcome: Progressing Towards Goal  Goal: *Optimize nutritional status  Outcome: Progressing Towards Goal     Problem: Patient Education: Go to Patient Education Activity  Goal: Patient/Family Education  Outcome: Progressing Towards Goal     Problem: Gas Exchange - Impaired  Goal: *Absence of hypoxia  Outcome: Progressing Towards Goal     Problem: Patient Education: Go to Patient Education Activity  Goal: Patient/Family Education  Outcome: Progressing Towards Goal

## 2020-08-05 NOTE — ED NOTES
I transported pt to room 360 on strecher, moniotr and oxygen at 2l/min per nasal cannula. Pt alert, states feeling less short of breath. Respirations regular and unlabored. Skin w/d/p.

## 2020-08-05 NOTE — ROUTINE PROCESS
Assume care of patient from Elvin Barone [id number], CAITLIN(ED). Patient received in bed awake. Patient A&Ox4, denies pain and discomfort. No distress noted. Bed locked in low position. Call bell within reach and patient verbalized understanding of use for assistance and needs. [facility] Night Shift Chart Audit    Chart Audit completed? YES    1348- Bedside and Verbal shift change report given to Shawn Yates (oncoming nurse) by Jah Ybarra RN (offgoing nurse). Report included the following information SBAR, Kardex, Intake/Output, MAR and Recent Results.

## 2020-08-05 NOTE — ED PROVIDER NOTES
EMERGENCY DEPARTMENT HISTORY AND PHYSICAL EXAM    Date: 8/4/2020  Patient Name: Kristen Diaz    History of Presenting Illness     Chief Complaint   Patient presents with    Shortness of Breath    COPD         History Provided By: Patient    [address]  is a 66 y.o. male with PMHX of COPD, hypertension, prostate cancer, hard of hearing who presents to the emergency department C/O COPD exacerbation. Patient reports that he has been feeling more short of breath for the past 2 days and has cough. He denies chest pain. Patient arrives by EMS initially hypoxic in the field tolerating nonrebreather. He denies fever. He is challenging historian given he is dependent on nonrebreather in ER and also hard of hearing    PCP: Karley Rae MD    Current Facility-Administered Medications   Medication Dose Route Frequency Provider Last Rate Last Dose    cefTRIAXone (ROCEPHIN) 2 g in sterile water (preservative free) 20 mL IV syringe  2 g IntraVENous Q24H Luciana Burton MD   2 g at 08/05/20 0004    azithromycin (ZITHROMAX) 500 mg in 0.9% sodium chloride 250 mL (VIAL-MATE)  500 mg IntraVENous Q24H Luciana Burton MD   500 mg at 08/05/20 0005     Current Outpatient Medications   Medication Sig Dispense Refill    predniSONE (STERAPRED DS) 10 mg dose pack See administration instruction per 10mg dose pack 21 Tab 0    albuterol (ACCUNEB) 1.25 mg/3 mL nebu Take 3 mL by inhalation every four (4) hours as needed for Wheezing (wheezing). 25 Each 0    dilTIAZem (CARDIZEM) 30 mg tablet Take 1 Tab by mouth Before breakfast, lunch, and dinner. 90 Tab 0    chlorthalidone (HYGROTEN) 25 mg tablet Take 25 mg by mouth daily.  acetaminophen (TYLENOL) 325 mg tablet Take 500 mg by mouth every four (4) hours as needed for Pain.  OXYGEN-AIR DELIVERY SYSTEMS Take 2 L by inhalation continuous.       dextran 70/hypromellose (ARTIFICIAL TEARS, PF, OP) Apply 2 Drops to eye as needed for Other (both eyes for dryness).  diphenhydrAMINE (BENADRYL) 25 mg capsule Take 25 mg by mouth nightly as needed for Itching.  albuterol (PROVENTIL HFA, VENTOLIN HFA, PROAIR HFA) 90 mcg/actuation inhaler Take 2 Puffs by inhalation every four (4) hours as needed for Wheezing or Shortness of Breath. 1 Inhaler 1    ipratropium (ATROVENT) 0.03 % nasal spray 2 Sprays by Both Nostrils route every twelve (12) hours as needed for Rhinitis.  tamsulosin (FLOMAX) 0.4 mg capsule Take 0.4 mg by mouth every evening. Past History     Past Medical History:  Past Medical History:   Diagnosis Date    Brain aneurysm     Cancer ([facility] [address]     prostate    COPD (chronic obstructive pulmonary disease) ([facility] [address]     Hypertension     Nocturia     Pneumonia     Radiation effect        Past Surgical History:  Past Surgical History:   Procedure Laterality Date    HX HERNIA REPAIR         Family History:  Family History   Problem Relation Age of Onset    Heart Disease Mother        Social History:  Social History     Tobacco Use    Smoking status: Former Smoker     Types: Cigarettes    Smokeless tobacco: Never Used   Substance Use Topics    Alcohol use: No    Drug use: Never       Allergies: Allergies   Allergen Reactions    Aspirin Other (comments)     \"messes my stomach up\"         Review of Systems   Review of Systems   Unable to perform ROS: Severe respiratory distress   Constitutional: Negative for chills and fever. Respiratory: Positive for cough and shortness of breath. Cardiovascular: Negative for chest pain. All other systems reviewed and are negative. Physical Exam     Vitals:    08/04/20 2345 08/05/20 0000 08/05/20 0017 08/05/20 0030   BP: 128/78 120/57 (!) 138/108 119/53   Pulse: (!) 133 95 100 (!) 101   Resp:       Temp:       SpO2: 100% 100%  100%   Weight:       Height:         Physical Exam  Vitals signs and nursing note reviewed. Constitutional:       General: He is not in acute distress.      Appearance: Normal appearance. He is ill-appearing. Comments: Awake sitting up, some additional work of breathing with accessory muscles, speaking short sentences   HENT:      Head: Normocephalic and atraumatic. Eyes:      Extraocular Movements: Extraocular movements intact. Conjunctiva/sclera: Conjunctivae normal.   Neck:      Musculoskeletal: Normal range of motion. Cardiovascular:      Rate and Rhythm: Tachycardia present. Rhythm irregular. Pulmonary:      Effort: Tachypnea and prolonged expiration present. No respiratory distress. Breath sounds: No stridor or decreased air movement. Wheezing present. Musculoskeletal: Normal range of motion. General: No deformity. Neurological:      General: No focal deficit present. Mental Status: He is alert and oriented to person, place, and time. Mental status is at baseline. Psychiatric:         Mood and Affect: Mood normal.         Behavior: Behavior normal.           Diagnostic Study Results     Labs -     Recent Results (from the past 12 hour(s))   CBC WITH AUTOMATED DIFF    Collection Time: 08/04/20  9:54 PM   Result Value Ref Range    WBC 17.4 (H) 4.6 - 13.2 K/uL    RBC 3.76 (L) 4.70 - 5.50 M/uL    HGB 10.7 (L) 13.0 - 16.0 g/dL    HCT 33.2 (L) 36.0 - 48.0 %    MCV 88.3 74.0 - 97.0 FL    MCH 28.5 24.0 - 34.0 PG    MCHC 32.2 31.0 - 37.0 g/dL    RDW 13.7 11.6 - 14.5 %    PLATELET 564 486 - 470 K/uL    MPV 8.7 (L) 9.2 - 11.8 FL    NEUTROPHILS 81 (H) 42 - 75 %    LYMPHOCYTES 3 (L) 20 - 51 %    MONOCYTES 8 2 - 9 %    EOSINOPHILS 8 (H) 0 - 5 %    BASOPHILS 0 0 - 3 %    ABS. NEUTROPHILS 14.1 (H) 1.8 - 8.0 K/UL    ABS. LYMPHOCYTES 0.5 (L) 0.8 - 3.5 K/UL    ABS. MONOCYTES 1.4 (H) 0 - 1.0 K/UL    ABS. EOSINOPHILS 1.4 (H) 0.0 - 0.4 K/UL    ABS.  BASOPHILS 0.0 0.0 - 0.1 K/UL    PLATELET COMMENTS ADEQUATE PLATELETS      RBC COMMENTS NORMOCYTIC, NORMOCHROMIC      DF MANUAL     CARDIAC PANEL,(CK, CKMB & TROPONIN)    Collection Time: 08/04/20  9:54 PM   Result Value Ref Range    CK - MB 3.8 (H) <3.6 ng/ml    CK-MB Index 3.2 0.0 - 4.0 %     39 - 308 U/L    Troponin-I, QT <0.02 0.0 - 9.079 NG/ML   METABOLIC PANEL, COMPREHENSIVE    Collection Time: 08/04/20  9:54 PM   Result Value Ref Range    Sodium 130 (L) 136 - 145 mmol/L    Potassium 3.2 (L) 3.5 - 5.5 mmol/L    Chloride 94 (L) 100 - 111 mmol/L    CO2 27 21 - 32 mmol/L    Anion gap 9 3.0 - 18 mmol/L    Glucose 136 (H) 74 - 99 mg/dL    BUN 28 (H) 7.0 - 18 MG/DL    Creatinine 1.51 (H) 0.6 - 1.3 MG/DL    BUN/Creatinine ratio 19 12 - 20      GFR est AA 55 (L) >60 ml/min/1.73m2    GFR est non-AA 45 (L) >60 ml/min/1.73m2    Calcium 8.3 (L) 8.5 - 10.1 MG/DL    Bilirubin, total 0.6 0.2 - 1.0 MG/DL    ALT (SGPT) 10 (L) 16 - 61 U/L    AST (SGOT) 16 10 - 38 U/L    Alk. phosphatase 98 45 - 117 U/L    Protein, total 6.5 6.4 - 8.2 g/dL    Albumin 3.3 (L) 3.4 - 5.0 g/dL    Globulin 3.2 2.0 - 4.0 g/dL    A-G Ratio 1.0 0.8 - 1.7     NT-PRO BNP    Collection Time: 08/04/20  9:54 PM   Result Value Ref Range    NT pro-BNP 79 0 - 1,800 PG/ML   LACTIC ACID    Collection Time: 08/04/20  9:54 PM   Result Value Ref Range    Lactic acid 1.3 0.4 - 2.0 MMOL/L   EKG, 12 LEAD, INITIAL    Collection Time: 08/04/20 10:04 PM   Result Value Ref Range    Ventricular Rate 104 BPM    Atrial Rate 104 BPM    P-R Interval 150 ms    QRS Duration 80 ms    Q-T Interval 364 ms    QTC Calculation (Bezet) 478 ms    Calculated P Axis 65 degrees    Calculated R Axis 46 degrees    Calculated T Axis 74 degrees    Diagnosis       Sinus tachycardia with frequent premature ventricular complexes  Poor R Wave Progression  Nonspecific ST abnormality  Abnormal ECG  Confirmed by Pilo Casey MD, [address] ([id number]) on 8/4/2020 11:04:07 PM         Radiologic Studies -   XR CHEST PORT   Final Result   IMPRESSION:  No acute process. Chronic pleural-parenchymal scarring and calcification of the pleura in the   right.         CT Results  (Last 48 hours)    None        CXR Results  (Last 48 hours)               08/04/20 9643  XR CHEST PORT Final result    Impression:  IMPRESSION:  No acute process. Chronic pleural-parenchymal scarring and calcification of the pleura in the   right. Narrative:  EXAM:  AP Portable Chest X-ray 1 view        INDICATION: Shortness of breath       COMPARISON: June 17, 2020 and CT chest dated October 31, 2019       _______________       FINDINGS:  Heart and mediastinal contours are within normal limits for portable   radiograph. Lungs are clear of active disease. Parenchymal scarring seen at the   right lung base. There are emphysematous changes. There are no pleural   effusions. Extensive pleural calcifications are seen on the right similar to   prior exam. No acute osseous findings. ________________                     Medications given in the ED-  Medications   cefTRIAXone (ROCEPHIN) 2 g in sterile water (preservative free) 20 mL IV syringe (2 g IntraVENous Given 8/5/20 0004)   azithromycin (ZITHROMAX) 500 mg in 0.9% sodium chloride 250 mL (VIAL-MATE) (500 mg IntraVENous Given 8/5/20 0005)   sodium chloride 0.9 % bolus infusion 1,000 mL (1,000 mL IntraVENous New Bag 8/5/20 0006)   methylPREDNISolone (PF) (Solu-MEDROL) injection 125 mg (125 mg IntraVENous Given 8/5/20 0004)   albuterol-ipratropium (DUO-NEB) 2.5 MG-0.5 MG/3 ML (3 mL Nebulization Given 8/5/20 0015)   albuterol-ipratropium (DUO-NEB) 2.5 MG-0.5 MG/3 ML (3 mL Nebulization Given 8/5/20 0014)   albuterol-ipratropium (DUO-NEB) 2.5 MG-0.5 MG/3 ML (3 mL Nebulization Given 8/5/20 0004)         Medical Decision Making   I am the first provider for this patient. I reviewed the vital signs, available nursing notes, past medical history, past surgical history, family history and social history. Vital Signs-Reviewed the patient's vital signs.     Pulse Oximetry Analysis and Interpretation:   100% on NRB, hypoxia, compensated    Cardiac Monitor:  Interpreted by Dr. Tegan Calderon at 2324   Rate: 110 bpm  Rhythm: sinus tach    EKG interpretation: (Preliminary)  Interpreted by Rafael Hernández MD at 2311  Sinus tachycardia rate of 104 with multiple PVCs        Records Reviewed: Nursing Notes and Old Medical Records    Provider Notes (Medical Decision Making): Nadir Dominguez is a 66 y.o. male who comes to the ER tonight with COPD exacerbation. Patient is chronically ill and has frequent emergency room visits for COPD exacerbations. He is stable on nonrebreather currently and is not requiring BiPAP. Chest x-ray does not show a new infiltrate however does have a leukocytosis and possible early or occult pneumonia so we will give antibiotics for CAP and plan on admission to hospital. Lactate negative - no sepsis    Procedures:  Procedures    ED Course:   CONSULT NOTE:   1:18 AM   Dr. Laurence Cox discussed care with Dr Sherrie More, Hospitalist  It was a standard discussion, including history of patients chief complaint, available diagnostic results, and treatment course. Discussed case. Patient was able to be transitioned down to nasal cannula. Will admit to hospital for continued management of COPD exacerbation        Diagnosis and Disposition     Critical Care Time:     Core Measures:  For Hospitalized Patients:    1. Hospitalization Decision Time:  The decision to hospitalize the patient was made by Dr Laurence Cox at [facility] on 8/4/2020    2. Aspirin: Aspirin was not given because the patient did not present with a stroke at the time of their Emergency Department evaluation    1:22 AM  Patient is being admitted to the hospital by Dr Sherrie More. The results of their tests and reasons for their admission have been discussed with them and/or available family. They convey agreement and understanding for the need to be admitted and for their admission diagnosis. CONDITIONS ON ADMISSION:  Sepsis is not present at the time of admission. Deep Vein Thrombosis is not present at the time of admission.  Thrombosis is not present at the time of admission. Urinary Tract Infection is not present at the time of admission. Pneumonia is present at the time of admission. MRSA is not present at the time of admission. Wound infection is not present at the time of admission. Pressure Ulcer is not present at the time of admission. CLINICAL IMPRESSION:    1. Chronic obstructive pulmonary disease, unspecified COPD type ([facility] [address]    2. Pneumonia due to infectious organism, unspecified laterality, unspecified part of lung      _________________________      Please note that this dictation was completed with [employer], the computer voice recognition software. Quite often unanticipated grammatical, syntax, homophones, and other interpretive errors are inadvertently transcribed by the computer software. Please disregard these errors. Please excuse any errors that have escaped final proofreading.

## 2020-08-05 NOTE — PROGRESS NOTES
DC Plan: TBD pending therapy recs    Chart reviewed. Pt admitted to tele by hospitalist for COPD. PT/OT eval orders noted, awaiting recs. No covid test ordered, if SNF to be considered please order covid test. CM will follow for transition of care needs [phone]    Reason for Admission:   Per H&P pt is \"is a 66 y.o. male who has h/o copd, htn, prostate cancer, and severe [place] who presents to the ED with CC of SOB and thinking that he is having a COPD exacerbation. He reports having worsening SOB and KUMAR for past 2-3 days with associated cough. He denies fever, chills, nightsweats, nausea, diarrhea. Denies known covid-19 exposure. In the field, EMS found him very hypoxic with 02 sats in the 70s. Placed on NRB and by the time he arrived in the ED, his 02 sats were in the 90s. In the ED, he was given nebs and solumedrol. Because his WBC was elevated, ED physician treated him for CAP. \"               RUR Score: 38%        PCP: First and Last name: listed as Maty   Name of Practice:   Are you a current patient: Yes/No:   Approximate date of last visit:    Can you do a virtual visit with your PCP:              Resources/supports as identified by patient/family:                   Top Challenges facing patient (as identified by patient/family and CM): Finances/Medication cost?                    Transportation? Support system or lack thereof? Living arrangements? Self-care/ADLs/Cognition? Current Advanced Directive/Advance Care Plan: On file                          Plan for utilizing home health:    HH v SNF                 Transition of Care Plan:    TBD              Care Management Interventions  Transition of Care Consult (CM Consult): Discharge Planning  Physical Therapy Consult: Yes  Occupational Therapy Consult:  Yes

## 2020-08-05 NOTE — PROGRESS NOTES
Problem: Mobility Impaired (Adult and Pediatric)  Goal: *Acute Goals and Plan of Care (Insert Text)  Description: Physical Therapy Goals   Initiated 8/5/2020 and to be accomplished within 7 day(s)  1. Patient will move from supine <> sit with S in prep for out of bed activity and change of position. 2.  Patient will perform sit<> stand with S/RW in prep for transfers/ambulation. 3.  Patient will ambulate 100 feet with S/RW for improved functional mobility at discharge. 5.  Patient will ascend/descend 3-5 stairs with handrail(s) with CGA/S for home re-entry as needed. Outcome: Progressing Towards Goal  PHYSICAL THERAPY EVALUATION    Patient: Balaji Oliveros (65 y.o. male)  Date: 8/5/2020  Primary Diagnosis: [facility] (chronic obstructive pulmonary disease) ([facility] [J44.9]  Precautions:  Fall    ASSESSMENT :  Based on the objective data described below, the patient seen on telemetry unit and found seated in chair at bedside. Pt presents with decreased independence in functional mobility with regard to bed mobility/transfers, gait quality and with decreased activity tolerance. Reports pain 3/10 R shd at rest and 8/10 with movement of R UE. Pt STS with CGA/RW and participated in GT/RW/CGA 10ft. Lisbet decreased with forward posture. Pt left back inchair with all needs in reach, and nurse  notified. Recommend HHPT for follow up physical therapy upon discharge to reach maximal level of independence/safety with functional mobility. Pt Education: Role of physical therapy in acute care setting, fall prevention and safety/technique during functional mobility tasks      Patient will benefit from skilled intervention to address the above impairments.   Patients rehabilitation potential is considered to be Fair  Factors which may influence rehabilitation potential include:   []         None noted  []         Mental ability/status  [x]         Medical condition  []         Home/family situation and support systems  []         Safety awareness  []         Pain tolerance/management  []         Other:      PLAN :  Recommendations and Planned Interventions:  [x]           Bed Mobility Training             []    Neuromuscular Re-Education  [x]           Transfer Training                   []    Orthotic/Prosthetic Training  [x]           Gait Training                          []    Modalities  [x]           Therapeutic Exercises          []    Edema Management/Control  [x]           Therapeutic Activities            [x]    Patient and Family Training/Education  []           Other (comment):    Frequency/Duration: Patient will be followed by physical therapy 1-2 times per day to address goals. Discharge Recommendations: Home Health   Further Equipment Recommendations for Discharge: N/A     SUBJECTIVE:   Patient stated I can't hardly move today; I got arthritis.     OBJECTIVE DATA SUMMARY:     Past Medical History:   Diagnosis Date    Brain aneurysm     Cancer ([facility] [address]     prostate    COPD (chronic obstructive pulmonary disease) ([facility] [address]     Hypertension     Nocturia     Pneumonia     Radiation effect      Past Surgical History:   Procedure Laterality Date    HX HERNIA REPAIR       Barriers to Learning/Limitations: yes;  physical, hearing  Compensate with: Visual Cues, Verbal Cues, and Tactile Cues  Prior Level of Function/Home Situation: amb with RW  Home Situation  Home Environment: Trailer/mobile home  # Steps to Enter: 6  Rails to Enter: Yes  Hand Rails : Bilateral  Wheelchair Ramp: No  One/Two Story Residence: One story  Living Alone: No  Support Systems: Spouse/Significant Other/Partner, Child(prashant)  Patient Expects to be Discharged to[de-identified] Trailer/mobile home  Current DME Used/Available at Home: [address], [place], straight, Walker, rolling(may benefit from rollator at this point)  Tub or Shower Type: Tub/Shower combination  Critical Behavior:  Neurologic State: Alert; Appropriate for age  Orientation Level: Oriented X4  Cognition: Decreased command following  Safety/Judgement: Awareness of environment; Fall prevention  Psychosocial  Patient Behaviors: Calm; Cooperative  Needs Expressed: Educational;Emotional  Purposeful Interaction: Yes  Pt Identified Daily Priority: Clinical issues (comment)  Caritas Process: Nurture loving kindness;Enable irais/hope;Establish trust;Nurture spiritual self;Teaching/learning; Attend basic human needs;Create healing environment  Caring Interventions: Reassure  Reassure: Therapeutic listening; Informing; Acceptance; Instilling irias and hope  Therapeutic Modalities: Deep breathing;Humor; Intentional therapeutic touch;Massage; Meditation  Skin Condition/Temp: Dry;Warm  Skin Integrity: Intact  Skin Integumentary  Skin Color: Appropriate for ethnicity  Skin Condition/Temp: Dry;Warm  Skin Integrity: Intact  Turgor: Epidermis thin w/ loss of subcut tissue  Hair Growth: Absent  Nails: Within Defined Limits  Strength:    Strength: Generally decreased, functional  Tone & Sensation:   Tone: Normal  Sensation: Intact  Range Of Motion:  AROM: Generally decreased, functional(R shd with OA)  PROM: Generally decreased, functional(R shd with OA)  Functional Mobility:  Transfers:  Sit to Stand: Contact guard assistance; Adaptive equipment  Stand to Sit: Contact guard assistance; Adaptive equipment  Bed to Chair: Contact guard assistance; Adaptive equipment  Balance:   Sitting: Intact  Standing: Intact; With support  Ambulation/Gait Training:  Distance (ft): 10 Feet (ft)  Assistive Device: Gait belt;Walker, rolling  Ambulation - Level of Assistance: Contact guard assistance  Gait Description (WDL): Exceptions to WDL  Gait Abnormalities: Decreased step clearance  Speed/Lisbet: Pace decreased (<100 feet/min)  Step Length: Right shortened;Left shortened  Interventions: Safety awareness training  Pain:  Pain Scale 1: Numeric (0 - 10)  Pain Intensity 1: 0  Pain Location 1: Shoulder  Pain Orientation 1: Right(at rest; increases to 8/10 with movement)  Pain Description 1: Aching  Activity Tolerance:   Fair-  Please refer to the flowsheet for vital signs taken during this treatment. After treatment:   [x]         Patient left in no apparent distress sitting up in chair  []         Patient left in no apparent distress in bed  [x]         Call bell left within reach  [x]         Nursing notified  []         Caregiver present  []         Bed alarm activated    COMMUNICATION/EDUCATION:   [x]         Fall prevention education was provided and the patient/caregiver indicated understanding. [x]         Patient/family have participated as able in goal setting and plan of care. [x]         Patient/family agree to work toward stated goals and plan of care. []         Patient understands intent and goals of therapy, but is neutral about his/her participation. []         Patient is unable to participate in goal setting and plan of care.     Thank you for this referral.  Liat Ugalde, PT   Time Calculation: 16 mins

## 2020-08-05 NOTE — H&P
History & Physical    Patient: Fer Wade MRN: [id number]  CSN: [id number]    YOB: 1945  Age: 66 y.o. Sex: male      DOA: 8/4/2020  Primary Care Provider:  Karey Tejada MD      Assessment/Plan   57-year-old male with a history of COPD and multiple other medical problems. Admitted for COPD exacerbation. No COVID risk factors.      COPD Exacerbation -  Admit to telemetry unit  Cardiac monitoring  IV Solu-Medrol 60 mg every 6 hours  DuoNebs every 6 hours PRN shortness of breath, difficulty breathing or respiratory distress  budesonide nebs every 12 hours  Oxygen supplementation     Presumptive CAP   Leukocytosis   Started Rocephin and Azithromycin    CXR clear, no significant infiltrates or GGO  No known obvious COVID-19 exposure, pt is home bound     DVT prophylaxis and GI prophylaxis ordered     Patient Active Problem List   Diagnosis Code    Hypertension I10    COPD (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 ([facility]) J44.9    Chronic renal disease, stage 3, moderately decreased glomerular filtration rate between 30-59 mL/min/1.73 square meter ([facility]) N18.3    Asbestosis ([facility] [address] J61    Acute exacerbation of chronic obstructive pulmonary disease (COPD) ([facility] [address] J44.1    Debility R53.81    Paroxysmal atrial fibrillation ([facility]) I48.0    Chronic respiratory failure with hypoxia ([facility]) J96.11    Tobacco dependence F17.200    Hyponatremia E87.1    Pleural effusion, right J90    COPD with acute exacerbation ([facility]) J44.1    Acute respiratory failure with hypoxia and hypercarbia ([facility]) J96.01, J96.02    Hyponatremia E87.1    Advanced care planning/counseling discussion Z71.89    Hypokalemia E87.6    COPD (chronic obstructive pulmonary disease) ([facility]) J44.9       Estimated length of stay : 2-3 days     CC: SOB       HPI:     Fer Wade is a 66 y.o. male who has h/o copd, htn, prostate cancer, and severe [place] who presents to the ED with CC of SOB and thinking that he is having a COPD exacerbation. He reports having worsening SOB and KUMAR for past 2-3 days with associated cough. He denies fever, chills, nightsweats, nausea, diarrhea. Denies known covid-19 exposure. In the field, EMS found him very hypoxic with 02 sats in the 70s. Placed on NRB and by the time he arrived in the ED, his 02 sats were in the 90s. In the ED, he was given nebs and solumedrol. Because his WBC was elevated, ED physician treated him for CAP. Past Medical History:   Diagnosis Date    Brain aneurysm     Cancer [facility])     prostate    COPD (chronic obstructive pulmonary disease) (HCC)     Hypertension     Nocturia     Pneumonia     Radiation effect        Past Surgical History:   Procedure Laterality Date    HX HERNIA REPAIR         Family History   Problem Relation Age of Onset    Heart Disease Mother        Social History     Socioeconomic History    Marital status:      Spouse name: Not on file    Number of children: Not on file    Years of education: Not on file    Highest education level: Not on file   Tobacco Use    Smoking status: Former Smoker     Types: Cigarettes    Smokeless tobacco: Never Used   Substance and Sexual Activity    Alcohol use: No    Drug use: Never       Prior to Admission medications    Medication Sig Start Date End Date Taking? Authorizing Provider   predniSONE (STERAPRED DS) 10 mg dose pack See administration instruction per 10mg dose pack 6/17/20   Berhane Zapata MD   albuterol (ACCUNEB) 1.25 mg/3 mL nebu Take 3 mL by inhalation every four (4) hours as needed for Wheezing (wheezing). 5/13/20   Ayala Andrade,    dilTIAZem (CARDIZEM) 30 mg tablet Take 1 Tab by mouth Before breakfast, lunch, and dinner. 4/14/20   Yoseph Ingram MD   chlorthalidone (HYGROTEN) 25 mg tablet Take 25 mg by mouth daily. Other, MD Blayne   acetaminophen (TYLENOL) 325 mg tablet Take 500 mg by mouth every four (4) hours as needed for Pain.     Provider, Historical OXYGEN-AIR DELIVERY SYSTEMS Take 2 L by inhalation continuous. Provider, Historical   dextran 70/hypromellose (ARTIFICIAL TEARS, PF, OP) Apply 2 Drops to eye as needed for Other (both eyes for dryness). Provider, Historical   diphenhydrAMINE (BENADRYL) 25 mg capsule Take 25 mg by mouth nightly as needed for Itching. Provider, Historical   albuterol (PROVENTIL HFA, VENTOLIN HFA, PROAIR HFA) 90 mcg/actuation inhaler Take 2 Puffs by inhalation every four (4) hours as needed for Wheezing or Shortness of Breath. 18   Prashant Zaidi PA-C   ipratropium (ATROVENT) 0.03 % nasal spray 2 Sprays by Both Nostrils route every twelve (12) hours as needed for Rhinitis. Other, MD Blayne   tamsulosin (FLOMAX) 0.4 mg capsule Take 0.4 mg by mouth every evening. Other, MD Blayne       Allergies   Allergen Reactions    Aspirin Other (comments)     \"messes my stomach up\"       Review of Systems  Gen: No fever, chills, malaise, weight loss/gain. Heent: No headache, rhinorrhea, epistaxis, ear pain, hearing loss, sinus pain, neck pain/stiffness, sore throat. Heart: No chest pain, palpitations, +SOB and KUMAR, pnd, or orthopnea. Resp: No cough, hemoptysis, wheezing and shortness of breath. GI: No nausea, vomiting, diarrhea, constipation, melena or hematochezia. : No urinary obstruction, dysuria or hematuria. Derm: No rash, new skin lesion or pruritis. Musc/skeletal: no bone or joint complains. Vasc: No edema, cyanosis or claudication. Endo: No heat/cold intolerance, no polyuria,polydipsia or polyphagia. Neuro: No unilateral weakness, numbness, tingling. No seizures. Heme: No easy bruising or bleeding.     Physical Exam:     Physical Exam:  Visit Vitals  /61   Pulse 91   Temp 98 °F (36.7 °C)   Resp 24   Ht 5' 8\" (1.727 m)   Wt 92.2 kg (203 lb 3.2 oz)   SpO2 99%   BMI 30.90 kg/m²    O2 Flow Rate (L/min): 15 l/min O2 Device: Non-rebreather mask    Temp (24hrs), Av °F (36.7 °C), Min:98 °F (36.7 °C), Max:98 °F (36.7 °C)    No intake/output data recorded. No intake/output data recorded. General:  Awake, cooperative, no distress. Head:  Normocephalic, without obvious abnormality, atraumatic. Eyes:  Conjunctivae/corneas clear, sclera anicteric, PERRL, EOMs intact. Nose: Nares normal. No drainage or sinus tenderness. Throat: Lips, mucosa, and tongue normal.    Neck: Supple, symmetrical, trachea midline, no adenopathy. Lungs:   Clear to auscultation bilaterally. Heart:  Regular rate and rhythm, S1, S2 normal, no murmur, click, rub or gallop. Abdomen: Soft, non-tender. Bowel sounds normal. No masses,  No organomegaly. Extremities: Extremities normal, atraumatic, no cyanosis or edema. Capillary refill normal.   Pulses: 2+ and symmetric all extremities. Skin: Skin color pink/pale/mottled/flushed, turgor normal. No rashes or lesions   Neurologic: CNII-XII intact. No focal motor or sensory deficit. Labs Reviewed:  Recent Results (from the past 24 hour(s))   CBC WITH AUTOMATED DIFF    Collection Time: 08/04/20  9:54 PM   Result Value Ref Range    WBC 17.4 (H) 4.6 - 13.2 K/uL    RBC 3.76 (L) 4.70 - 5.50 M/uL    HGB 10.7 (L) 13.0 - 16.0 g/dL    HCT 33.2 (L) 36.0 - 48.0 %    MCV 88.3 74.0 - 97.0 FL    MCH 28.5 24.0 - 34.0 PG    MCHC 32.2 31.0 - 37.0 g/dL    RDW 13.7 11.6 - 14.5 %    PLATELET 640 055 - 837 K/uL    MPV 8.7 (L) 9.2 - 11.8 FL    NEUTROPHILS 81 (H) 42 - 75 %    LYMPHOCYTES 3 (L) 20 - 51 %    MONOCYTES 8 2 - 9 %    EOSINOPHILS 8 (H) 0 - 5 %    BASOPHILS 0 0 - 3 %    ABS. NEUTROPHILS 14.1 (H) 1.8 - 8.0 K/UL    ABS. LYMPHOCYTES 0.5 (L) 0.8 - 3.5 K/UL    ABS. MONOCYTES 1.4 (H) 0 - 1.0 K/UL    ABS. EOSINOPHILS 1.4 (H) 0.0 - 0.4 K/UL    ABS.  BASOPHILS 0.0 0.0 - 0.1 K/UL    PLATELET COMMENTS ADEQUATE PLATELETS      RBC COMMENTS NORMOCYTIC, NORMOCHROMIC      DF MANUAL     CARDIAC PANEL,(CK, CKMB & TROPONIN)    Collection Time: 08/04/20  9:54 PM   Result Value Ref Range CK - MB 3.8 (H) <3.6 ng/ml    CK-MB Index 3.2 0.0 - 4.0 %     39 - 308 U/L    Troponin-I, QT <0.02 0.0 - 4.791 NG/ML   METABOLIC PANEL, COMPREHENSIVE    Collection Time: 08/04/20  9:54 PM   Result Value Ref Range    Sodium 130 (L) 136 - 145 mmol/L    Potassium 3.2 (L) 3.5 - 5.5 mmol/L    Chloride 94 (L) 100 - 111 mmol/L    CO2 27 21 - 32 mmol/L    Anion gap 9 3.0 - 18 mmol/L    Glucose 136 (H) 74 - 99 mg/dL    BUN 28 (H) 7.0 - 18 MG/DL    Creatinine 1.51 (H) 0.6 - 1.3 MG/DL    BUN/Creatinine ratio 19 12 - 20      GFR est AA 55 (L) >60 ml/min/1.73m2    GFR est non-AA 45 (L) >60 ml/min/1.73m2    Calcium 8.3 (L) 8.5 - 10.1 MG/DL    Bilirubin, total 0.6 0.2 - 1.0 MG/DL    ALT (SGPT) 10 (L) 16 - 61 U/L    AST (SGOT) 16 10 - 38 U/L    Alk.  phosphatase 98 45 - 117 U/L    Protein, total 6.5 6.4 - 8.2 g/dL    Albumin 3.3 (L) 3.4 - 5.0 g/dL    Globulin 3.2 2.0 - 4.0 g/dL    A-G Ratio 1.0 0.8 - 1.7     NT-PRO BNP    Collection Time: 08/04/20  9:54 PM   Result Value Ref Range    NT pro-BNP 79 0 - 1,800 PG/ML   LACTIC ACID    Collection Time: 08/04/20  9:54 PM   Result Value Ref Range    Lactic acid 1.3 0.4 - 2.0 MMOL/L   EKG, 12 LEAD, INITIAL    Collection Time: 08/04/20 10:04 PM   Result Value Ref Range    Ventricular Rate 104 BPM    Atrial Rate 104 BPM    P-R Interval 150 ms    QRS Duration 80 ms    Q-T Interval 364 ms    QTC Calculation (Bezet) 478 ms    Calculated P Axis 65 degrees    Calculated R Axis 46 degrees    Calculated T Axis 74 degrees    Diagnosis       Sinus tachycardia with frequent premature ventricular complexes  Poor R Wave Progression  Nonspecific ST abnormality  Abnormal ECG  Confirmed by Lety Hali MD, Lilliam Parish ([id number]) on 8/4/2020 11:04:07 PM         Procedures/imaging: see electronic medical records for all procedures/Xrays and details which were not copied into this note but were reviewed prior to creation of Plan      CC: Nisreen Ngo MD

## 2020-08-05 NOTE — PROGRESS NOTES
Valentina Mirza is a 66y.o. year old male receiving Ceftriaxone for CAP    Height:   Ht Readings from Last 1 Encounters:   20 172.7 cm (68\")    Weight:   Wt Readings from Last 1 Encounters:   20 92.2 kg (203 lb 3.2 oz)        Estimated Creatinine Clearance: 45.9 mL/min (A) (based on SCr of 1.51 mg/dL (H)).     Current Antimicrobial Therapy (168h ago, onward)      Ordered     Start Stop    20 0636  cefTRIAXone (ROCEPHIN) 1 g in sterile water (preservative free) 10 mL IV syringe  1 g,   IntraVENous,   EVERY 24 HOURS     Note to Pharmacy:  First dose Stat    20 0100 --    20  azithromycin (ZITHROMAX) 500 mg in 0.9% sodium chloride 250 mL (VIAL-MATE)  500 mg,   IntraVENous,   EVERY 24 HOURS      20 --              GRAM STAIN   Date Value Ref Range Status   2020 1+ EPITHELIAL CELLS SEEN   Final   2020 2+ GRAM POSITIVE COCCI IN CHAINS   Final   2020 2+ GRAM POSITIVE RODS   Final     Culture result:   Date Value Ref Range Status   2020 HEAVY NORMAL RESPIRATORY ELLA   Final   2020 (A)   Final    SCANT * METHICILLIN RESISTANT STAPHYLOCOCCUS AUREUS *   2020 NO GROWTH 6 DAYS   Final   2020 NO GROWTH 6 DAYS   Final   2020 NO GROWTH 6 DAYS   Final   2020 NO GROWTH 6 DAYS   Final   2020 NO GROWTH 6 DAYS   Final       Temp (24hrs), Av.1 °F (36.7 °C), Min:97.9 °F (36.6 °C), Max:98.4 °F (36.9 °C)      Recent Labs     20  2154   WBC 17.4*       DOSING CHART:    *May consider 2G IV dose for gram negative infections  Site and Type of Infection Dose and Route Frequency   Urinary Tract Infection 1G IV Q 24 hours   Bacteremia 1-2G IV* Q 24 hours   Upper Respiratory Tract Infection (if appropriate) 1G IV Q 24 hours   Skin and Soft Tissue Infection 1G IV Q 24 hours   Intra-abdominal Infection    (usually add metronidazole) 1G IV Q 24 hours   Gonorrhea                          (usually with Azithromycin or Doxycycline to cover Chlamydia) 250mg IM   1G (disseminated) Once  Q 24 hours (disseminated)   Endocarditis 2G IV Q 24 hours (may use q 12 hours with ampicillin for Enterococcus sp.)   Meningitis and CNS infections 2G IV Q 12 hours   Pneumonia 1G IV Q 24 hours   Osteomyelitis or prosthetic joint infections 2G IV  Q 24 hours       Patients excluded from an automatic dose change are as follows:  - Doses ordered by an infectious diseases physician. If appropriate, the pharmacist will call the physician about concerns. - Patients weighing > 100 kg- pharmacist may use their professional judgment for specific patient dosing  - Patient has symptoms consistent with, or diagnosis of meningitis, CNS infection, endocarditis or osteomyelitis. -  - Pediatric patients    Per [address] [place] [address] this patient's Ceftriaxone dose has been changed to 1 gm IV every 24 hours.     Kush Tamayo, [address]

## 2020-08-05 NOTE — PROGRESS NOTES
Problem: Falls - Risk of  Goal: *Absence of Falls  Description: Document Jenaro Cardoza Fall Risk and appropriate interventions in the flowsheet. Outcome: Progressing Towards Goal  Note: Fall Risk Interventions:  Mobility Interventions: Bed/chair exit alarm, Communicate number of staff needed for ambulation/transfer, Patient to call before getting OOB, Utilize walker, cane, or other assistive device         Medication Interventions: Bed/chair exit alarm, Patient to call before getting OOB, Teach patient to arise slowly                   Problem: Patient Education: Go to Patient Education Activity  Goal: Patient/Family Education  Outcome: Progressing Towards Goal     Problem: Pressure Injury - Risk of  Goal: *Prevention of pressure injury  Description: Document Nikos Scale and appropriate interventions in the flowsheet.   Outcome: Progressing Towards Goal  Note: Pressure Injury Interventions:  Sensory Interventions: Assess changes in LOC, Float heels, Keep linens dry and wrinkle-free, Minimize linen layers, Monitor skin under medical devices, Pressure redistribution bed/mattress (bed type)         Activity Interventions: Increase time out of bed, Pressure redistribution bed/mattress(bed type), PT/OT evaluation    Mobility Interventions: HOB 30 degrees or less, Pressure redistribution bed/mattress (bed type), PT/OT evaluation    Nutrition Interventions: Document food/fluid/supplement intake                     Problem: Patient Education: Go to Patient Education Activity  Goal: Patient/Family Education  Outcome: Progressing Towards Goal     Problem: Pain  Goal: *Control of Pain  Outcome: Progressing Towards Goal  Goal: *PALLIATIVE CARE:  Alleviation of Pain  Outcome: Progressing Towards Goal     Problem: Patient Education: Go to Patient Education Activity  Goal: Patient/Family Education  Outcome: Progressing Towards Goal     Problem: Chronic Obstructive Pulmonary Disease (COPD)  Goal: *Oxygen saturation during activity within specified parameters  Outcome: Progressing Towards Goal  Goal: *Able to remain out of bed as prescribed  Outcome: Progressing Towards Goal  Goal: *Absence of hypoxia  Outcome: Progressing Towards Goal  Goal: *Optimize nutritional status  Outcome: Progressing Towards Goal     Problem: Patient Education: Go to Patient Education Activity  Goal: Patient/Family Education  Outcome: Progressing Towards Goal     Problem: Gas Exchange - Impaired  Goal: *Absence of hypoxia  Outcome: Progressing Towards Goal     Problem: Patient Education: Go to Patient Education Activity  Goal: Patient/Family Education  Outcome: Progressing Towards Goal

## 2020-08-05 NOTE — PROGRESS NOTES
Problem: Self Care Deficits Care Plan (Adult)  Goal: *Acute Goals and Plan of Care (Insert Text)  Description: Initial Occupational Therapy Goals (8/5/2020) Within 7 day(s):    1. Patient will perform grooming standing at sink with Supervision x 3-4 minutes for increased independence with ADLs. 2. Patient will perform UB dressing with setup for increased independence with ADLs. 3. Patient will perform LB dressing with minimal assist & A/E PRN for increased independence with ADLs. 4. Patient will perform all aspects of toileting with Sueprvision for increased independence in ADLs  5. Patient will independently apply energy conservation techniques with 1 verbal cue(s) for increased independence with ADLs. 6. Patient will utilize good body mechanics during ADLs with 1 verbal cue(s). 7. Patient will independently manage O2 tubing to safely ambulate to/from bathroom. 8. Patient will perform R shoulder ROM HEP w/ supervision for increased ROM >45 degrees for ADLs  Outcome: Progressing Towards Goal     OCCUPATIONAL THERAPY EVALUATION    Patient: Vijay Laughlin (17 y.o. male)  Date: 8/5/2020  Primary Diagnosis: COPD (chronic obstructive pulmonary disease) ([facility] [J44.9]        Precautions: R shldr pain,  Fall(O2)  PLOF: Patient requires assist at baseline. Pt well-known to this OT from multiple admissions d/t COPD. Pt has good family support. Pt was a community ambulator, but has decreased to limited mobility d/t poor respiratory status. Spouse assists w/ LE dressing    ASSESSMENT AND RECOMMENDATIONS:  Based on the objective data described below, the patient presents with RUE decreased ROM and strength affecting UE ADLs. Patient requires mod/maxA to [facility] gown. Patient with adequate hip ROM for side sitting technique for sock donning, but reports spouse assists d/t the respiratory effort. Patient greatly limited by breathing and functional endurance.  Pt agreeable to sitting up in bed for RUE support as well as assist w/ breathing. Pt CGA. Provided ice to R shoulder for pain mgmt. Patient will benefit from skilled OT intervention. Education: Reviewed home safety, body mechanics, importance of moving every hour to prevent joint stiffness, Pendulum Exercises, roll of ice for edema/pain control, one-handed techniques, proper positioning of sling, and adaptive dressing techniques with patient verbalizing understanding at this time     Patient will benefit from skilled intervention to address the above impairments. Patient's rehabilitation potential is considered to be Fair  Factors which may influence rehabilitation potential include:   []             None noted  []             Mental ability/status  [x]             Medical condition  []             Home/family situation and support systems  []             Safety awareness  []             Pain tolerance/management  []             Other:      PLAN :  Recommendations and Planned Interventions:   [x]               Self Care Training                  [x]      Therapeutic Activities  [x]               Functional Mobility Training   [x]      Cognitive Retraining  [x]               Therapeutic Exercises           [x]      Endurance Activities  [x]               Balance Training                    [x]      Neuromuscular Re-Education  []               Visual/Perceptual Training     [x]      Home Safety Training  [x]               Patient Education                   [x]      Family Training/Education  []               Other (comment):    Frequency/Duration: Patient will be followed by Occupational Therapy 1-2 times per day/2-4 days per week to address goals. Discharge Recommendations: Home Health  Further Equipment Recommendations for Discharge: To Be Determined (TBD) at next level of care (Rollator, shower chair/Tub transfer bench) [patient usually declines DME and Home Health]     SUBJECTIVE:   Patient stated I remember you.  I'm sorry I keep coming back here.    OBJECTIVE DATA SUMMARY:     Past Medical History:   Diagnosis Date    Brain aneurysm     Cancer ([facility]     prostate    COPD (chronic obstructive pulmonary disease) ([facility]     Hypertension     Nocturia     Pneumonia     Radiation effect      Past Surgical History:   Procedure Laterality Date    HX HERNIA REPAIR       Barriers to Learning/Limitations: yes;  sensory deficits-vision/hearing/speech  Compensate with: visual, verbal, tactile, kinesthetic cues/model    Home Situation/Prior Level of Function: supportive family, but don't like strangers in home and decline [address]: Trailer/mobile home  # Steps to Enter: 6  Rails to Enter: Yes  Hand Rails : Bilateral  Wheelchair Ramp: No  One/Two Story Residence: One story  Living Alone: No  Support Systems: Spouse/Significant Other/Partner, Child(prashant)  Patient Expects to be Discharged to[de-identified] Trailer/mobile home  Current DME Used/Available at Home: Cane, straight, Walker, rolling  Tub or Shower Type: Tub/Shower combination  [x]  Right hand dominant   []  Left hand dominant    Cognitive/Behavioral Status:  Neurologic State: Alert; Appropriate for age  Orientation Level: Appropriate for age;Oriented X4  Cognition: Follows commands  Safety/Judgement: Awareness of environment;Decreased awareness of need for safety;Decreased awareness of need for assistance;Decreased insight into deficits    Skin: fragile, thin  Edema: mild/trace BLE edema; applied ice to R shoulder    Vision/Perceptual:   Grossly WFL    Coordination: BUE  Coordination: Within functional limits  Fine Motor Skills-Upper: Left Intact; Right Intact    Gross Motor Skills-Upper: Left Intact; Right Intact    Balance:  Sitting: Intact  Standing: Intact; With support    Strength: BUE  Strength: Generally decreased, functional    Tone & Sensation:BUE  Tone: Normal  Sensation: Intact    Range of Motion: BUE  AROM: Generally decreased, functional(R shldr pain d/t OA)  PROM: Generally decreased, functional(R shldr limited d/t pain from OA)    Functional Mobility and Transfers for ADLs:  Bed Mobility:  Supine to Sit: Stand-by assistance  Scooting: Stand-by assistance  Transfers:  Sit to Stand: Contact guard assistance; Adaptive equipment  Bed to Chair: Contact guard assistance; Adaptive equipment    ADL Assessment:  Feeding: Setup    Oral Facial Hygiene/Grooming: Minimum assistance; Moderate assistance; Additional time    Bathing: Moderate assistance;Maximum assistance    Upper Body Dressing: Moderate assistance    Lower Body Dressing: Moderate assistance    Toileting: Moderate assistance;Contact guard assistance    ADL Intervention:  Feeding  Container Management: Maximum assistance  Drink to Mouth: Independent    Grooming  Washing Hands: Set-up(seated with wipes)    Lower Body Dressing Assistance  Socks: Total assistance (dependent)(spouse has been assisting)  Position Performed: Seated edge of bed    Toileting  Bladder Hygiene: Contact guard assistance;Minimum assistance    Cognitive Retraining  Problem Solving: Inductive reason; Identifying the task; Identifying the problem;General alternative solution;Deductive reason; Awareness of environment  Executive Functions: Executing cognitive plans;Managing time  Organizing/Sequencing: Breaking task down;Prioritizing  Safety/Judgement: Awareness of environment;Decreased awareness of need for safety;Decreased awareness of need for assistance;Decreased insight into deficits    Therapeutic Exercise:  Unable to tolerate any ROM at this time d/t pain    Pain:  Pain level pre-treatment: 7/10  Pain level post-treatment: 5/10  Pain Intervention(s): Medication administer by Nursing (see MAR); Rest, Ice, Repositioning   Response to intervention: Nurse notified, see doc flow sheet    Activity Tolerance:   Fair-. Patient able to stand <1 minute(s). Patient able to complete ADLs with constant rest breaks. Patient limited by respiratory status. Patient unsteady.      Please refer to the flowsheet for vital signs taken during this treatment. After treatment:   [x]  Patient left in no apparent distress sitting up in chair  []  Patient sitting on EOB  []  Patient left in no apparent distress in bed  [x]  Call bell left within reach  [x]  Nursing notified  []  Caregiver present  [x]  Ice applied  []  SCD's on while back in bed  [] Bed alarm activated    COMMUNICATION/EDUCATION:   Communication/Collaboration:  [x]       Role of Occupational Therapy in the acute care setting. [x]      Home safety education was provided and the patient/caregiver indicated understanding. [x]      Patient/family have participated as able in goal setting and plan of care. [x]      Patient/family agree to work toward stated goals and plan of care. []      Patient understands intent and goals of therapy, but is neutral about his/her participation. []      Patient is unable to participate in plan of care at this time. Thank you for this referral.  Brina Lindsey, OTR/L, CSRS, CDCS  Time Calculation: 23 mins    Eval Complexity: History: HIGH Complexity : Extensive review of history including physical, cognitive and psychosocial history ; Examination: HIGH Complexity : 5 or more performance deficits relating to physical, cognitive , or psychosocial skils that result in activity limitations and / or participation restrictions; Decision Making:HIGH Complexity : Patient presents with comorbidities that affect occupational performance.  Signifigant modification of tasks or assistance (eg, physical or verbal) with assessment (s) is necessary to enable patient to complete evaluation

## 2020-08-06 ENCOUNTER — APPOINTMENT (OUTPATIENT)
Dept: GENERAL RADIOLOGY | Age: 75
DRG: 190 | End: 2020-08-06
Attending: HOSPITALIST
Payer: MEDICARE

## 2020-08-06 LAB
ANION GAP SERPL CALC-SCNC: 10 MMOL/L (ref 3–18)
ARTERIAL PATENCY WRIST A: YES
BASE EXCESS BLD CALC-SCNC: 0 MMOL/L
BDY SITE: ABNORMAL
BUN SERPL-MCNC: 33 MG/DL (ref 7–18)
BUN/CREAT SERPL: 28 (ref 12–20)
CALCIUM SERPL-MCNC: 8.2 MG/DL (ref 8.5–10.1)
CHLORIDE SERPL-SCNC: 96 MMOL/L (ref 100–111)
CO2 SERPL-SCNC: 25 MMOL/L (ref 21–32)
CREAT SERPL-MCNC: 1.19 MG/DL (ref 0.6–1.3)
ERYTHROCYTE [DISTWIDTH] IN BLOOD BY AUTOMATED COUNT: 13.7 % (ref 11.6–14.5)
GAS FLOW.O2 O2 DELIVERY SYS: ABNORMAL L/MIN
GAS FLOW.O2 SETTING OXYMISER: 1.5 L/M
GLUCOSE BLD STRIP.AUTO-MCNC: 150 MG/DL (ref 70–110)
GLUCOSE BLD STRIP.AUTO-MCNC: 164 MG/DL (ref 70–110)
GLUCOSE SERPL-MCNC: 149 MG/DL (ref 74–99)
HCO3 BLD-SCNC: 24.6 MMOL/L (ref 22–26)
HCT VFR BLD AUTO: 30.5 % (ref 36–48)
HGB BLD-MCNC: 9.8 G/DL (ref 13–16)
MCH RBC QN AUTO: 28.4 PG (ref 24–34)
MCHC RBC AUTO-ENTMCNC: 32.1 G/DL (ref 31–37)
MCV RBC AUTO: 88.4 FL (ref 74–97)
O2/TOTAL GAS SETTING VFR VENT: 26 %
PCO2 BLD: 36.9 MMHG (ref 35–45)
PH BLD: 7.43 [PH] (ref 7.35–7.45)
PLATELET # BLD AUTO: 303 K/UL (ref 135–420)
PMV BLD AUTO: 9.1 FL (ref 9.2–11.8)
PO2 BLD: 114 MMHG (ref 80–100)
POTASSIUM SERPL-SCNC: 3.6 MMOL/L (ref 3.5–5.5)
RBC # BLD AUTO: 3.45 M/UL (ref 4.7–5.5)
SAO2 % BLD: 99 % (ref 92–97)
SERVICE CMNT-IMP: ABNORMAL
SODIUM SERPL-SCNC: 131 MMOL/L (ref 136–145)
SPECIMEN TYPE: ABNORMAL
TOTAL RESP. RATE, ITRR: 30
WBC # BLD AUTO: 15.1 K/UL (ref 4.6–13.2)

## 2020-08-06 PROCEDURE — 94760 N-INVAS EAR/PLS OXIMETRY 1: CPT

## 2020-08-06 PROCEDURE — 77010033678 HC OXYGEN DAILY

## 2020-08-06 PROCEDURE — 74011250637 HC RX REV CODE- 250/637: Performed by: FAMILY MEDICINE

## 2020-08-06 PROCEDURE — 74011250636 HC RX REV CODE- 250/636: Performed by: HOSPITALIST

## 2020-08-06 PROCEDURE — 97530 THERAPEUTIC ACTIVITIES: CPT

## 2020-08-06 PROCEDURE — 65660000000 HC RM CCU STEPDOWN

## 2020-08-06 PROCEDURE — 82962 GLUCOSE BLOOD TEST: CPT

## 2020-08-06 PROCEDURE — 36600 WITHDRAWAL OF ARTERIAL BLOOD: CPT

## 2020-08-06 PROCEDURE — 85027 COMPLETE CBC AUTOMATED: CPT

## 2020-08-06 PROCEDURE — 74011000250 HC RX REV CODE- 250: Performed by: HOSPITALIST

## 2020-08-06 PROCEDURE — 97116 GAIT TRAINING THERAPY: CPT

## 2020-08-06 PROCEDURE — 94640 AIRWAY INHALATION TREATMENT: CPT

## 2020-08-06 PROCEDURE — 71045 X-RAY EXAM CHEST 1 VIEW: CPT

## 2020-08-06 PROCEDURE — 74011250636 HC RX REV CODE- 250/636: Performed by: FAMILY MEDICINE

## 2020-08-06 PROCEDURE — 74011000250 HC RX REV CODE- 250: Performed by: FAMILY MEDICINE

## 2020-08-06 PROCEDURE — 76450000000

## 2020-08-06 PROCEDURE — 74011250637 HC RX REV CODE- 250/637: Performed by: HOSPITALIST

## 2020-08-06 PROCEDURE — 36415 COLL VENOUS BLD VENIPUNCTURE: CPT

## 2020-08-06 PROCEDURE — 80048 BASIC METABOLIC PNL TOTAL CA: CPT

## 2020-08-06 PROCEDURE — 74011250636 HC RX REV CODE- 250/636: Performed by: INTERNAL MEDICINE

## 2020-08-06 PROCEDURE — 82803 BLOOD GASES ANY COMBINATION: CPT

## 2020-08-06 RX ORDER — FUROSEMIDE 10 MG/ML
40 INJECTION INTRAMUSCULAR; INTRAVENOUS ONCE
Status: COMPLETED | OUTPATIENT
Start: 2020-08-06 | End: 2020-08-06

## 2020-08-06 RX ORDER — ALBUTEROL SULFATE 2.5 MG/.5ML
2.5 SOLUTION RESPIRATORY (INHALATION)
Status: DISCONTINUED | OUTPATIENT
Start: 2020-08-06 | End: 2020-08-10 | Stop reason: HOSPADM

## 2020-08-06 RX ORDER — IPRATROPIUM BROMIDE AND ALBUTEROL SULFATE 2.5; .5 MG/3ML; MG/3ML
3 SOLUTION RESPIRATORY (INHALATION)
Status: DISCONTINUED | OUTPATIENT
Start: 2020-08-06 | End: 2020-08-10 | Stop reason: HOSPADM

## 2020-08-06 RX ORDER — GUAIFENESIN 600 MG/1
600 TABLET, EXTENDED RELEASE ORAL EVERY 12 HOURS
Status: DISCONTINUED | OUTPATIENT
Start: 2020-08-06 | End: 2020-08-10 | Stop reason: HOSPADM

## 2020-08-06 RX ORDER — ARFORMOTEROL TARTRATE 15 UG/2ML
15 SOLUTION RESPIRATORY (INHALATION)
Status: DISCONTINUED | OUTPATIENT
Start: 2020-08-06 | End: 2020-08-10 | Stop reason: HOSPADM

## 2020-08-06 RX ADMIN — BUDESONIDE 500 MCG: 0.5 INHALANT RESPIRATORY (INHALATION) at 20:37

## 2020-08-06 RX ADMIN — METHYLPREDNISOLONE SODIUM SUCCINATE 60 MG: 125 INJECTION, POWDER, FOR SOLUTION INTRAMUSCULAR; INTRAVENOUS at 05:00

## 2020-08-06 RX ADMIN — BUDESONIDE 500 MCG: 0.5 INHALANT RESPIRATORY (INHALATION) at 08:00

## 2020-08-06 RX ADMIN — FUROSEMIDE 40 MG: 10 INJECTION, SOLUTION INTRAMUSCULAR; INTRAVENOUS at 16:45

## 2020-08-06 RX ADMIN — TAMSULOSIN HYDROCHLORIDE 0.4 MG: 0.4 CAPSULE ORAL at 17:38

## 2020-08-06 RX ADMIN — Medication 10 ML: at 00:43

## 2020-08-06 RX ADMIN — IPRATROPIUM BROMIDE AND ALBUTEROL SULFATE 3 ML: .5; 3 SOLUTION RESPIRATORY (INHALATION) at 13:22

## 2020-08-06 RX ADMIN — DILTIAZEM HYDROCHLORIDE 30 MG: 30 TABLET, FILM COATED ORAL at 07:08

## 2020-08-06 RX ADMIN — GUAIFENESIN 600 MG: 600 TABLET, EXTENDED RELEASE ORAL at 22:07

## 2020-08-06 RX ADMIN — HEPARIN SODIUM 5000 UNITS: 5000 INJECTION INTRAVENOUS; SUBCUTANEOUS at 13:08

## 2020-08-06 RX ADMIN — HEPARIN SODIUM 5000 UNITS: 5000 INJECTION INTRAVENOUS; SUBCUTANEOUS at 22:07

## 2020-08-06 RX ADMIN — AZITHROMYCIN 500 MG: 500 INJECTION, POWDER, LYOPHILIZED, FOR SOLUTION INTRAVENOUS at 00:43

## 2020-08-06 RX ADMIN — IPRATROPIUM BROMIDE AND ALBUTEROL SULFATE 3 ML: .5; 3 SOLUTION RESPIRATORY (INHALATION) at 16:18

## 2020-08-06 RX ADMIN — IPRATROPIUM BROMIDE AND ALBUTEROL SULFATE 3 ML: .5; 3 SOLUTION RESPIRATORY (INHALATION) at 00:16

## 2020-08-06 RX ADMIN — ARFORMOTEROL TARTRATE 15 MCG: 15 SOLUTION RESPIRATORY (INHALATION) at 20:37

## 2020-08-06 RX ADMIN — HEPARIN SODIUM 5000 UNITS: 5000 INJECTION INTRAVENOUS; SUBCUTANEOUS at 04:56

## 2020-08-06 RX ADMIN — Medication 10 ML: at 14:03

## 2020-08-06 RX ADMIN — GUAIFENESIN 600 MG: 600 TABLET, EXTENDED RELEASE ORAL at 14:02

## 2020-08-06 RX ADMIN — CHLORTHALIDONE 25 MG: 25 TABLET ORAL at 08:58

## 2020-08-06 RX ADMIN — METHYLPREDNISOLONE SODIUM SUCCINATE 60 MG: 125 INJECTION, POWDER, FOR SOLUTION INTRAMUSCULAR; INTRAVENOUS at 00:42

## 2020-08-06 RX ADMIN — METHYLPREDNISOLONE SODIUM SUCCINATE 40 MG: 40 INJECTION, POWDER, FOR SOLUTION INTRAMUSCULAR; INTRAVENOUS at 22:07

## 2020-08-06 RX ADMIN — FUROSEMIDE 40 MG: 10 INJECTION, SOLUTION INTRAMUSCULAR; INTRAVENOUS at 14:02

## 2020-08-06 RX ADMIN — METHYLPREDNISOLONE SODIUM SUCCINATE 60 MG: 125 INJECTION, POWDER, FOR SOLUTION INTRAMUSCULAR; INTRAVENOUS at 11:45

## 2020-08-06 RX ADMIN — IPRATROPIUM BROMIDE AND ALBUTEROL SULFATE 3 ML: .5; 3 SOLUTION RESPIRATORY (INHALATION) at 20:37

## 2020-08-06 RX ADMIN — IPRATROPIUM BROMIDE AND ALBUTEROL SULFATE 3 ML: .5; 3 SOLUTION RESPIRATORY (INHALATION) at 08:00

## 2020-08-06 RX ADMIN — DILTIAZEM HYDROCHLORIDE 30 MG: 30 TABLET, FILM COATED ORAL at 16:45

## 2020-08-06 RX ADMIN — DILTIAZEM HYDROCHLORIDE 30 MG: 30 TABLET, FILM COATED ORAL at 11:45

## 2020-08-06 RX ADMIN — CEFTRIAXONE 1 G: 1 INJECTION, POWDER, FOR SOLUTION INTRAMUSCULAR; INTRAVENOUS at 00:42

## 2020-08-06 RX ADMIN — Medication 10 ML: at 22:00

## 2020-08-06 NOTE — PROGRESS NOTES
Occupational Therapy Treatment Attempt    Chart reviewed. Attempted Occupational Therapy Treatment, however, patient unable to be seen due to:  []  Nausea/vomiting  [x]  Eating/sitting EOB  []  Pain  []  Patient too lethargic  []  Off Unit for testing/procedure  []  Dialysis treatment in progress  []  Telemetry Results  []  Other:     Will f/u later as patient's schedule allows.    Thank you for this referral.  Brina Fang, OTR/L, [address], [facility]

## 2020-08-06 NOTE — PROGRESS NOTES
Hospitalist Progress Note-critical care note     Patient: Zachery Leventhal MRN: [id number]  CSN: [id number]    YOB: 1945  Age: 66 y.o. Sex: male    DOA: 8/4/2020 LOS:  LOS: 1 day            Chief complaint: copd exacerbation ,cap     Assessment/Plan         Hospital Problems  Date Reviewed: 6/13/2020          Codes Class Noted POA    COPD (chronic obstructive pulmonary disease) ([facility] ICD-10-CM: J44.9  ICD-9-CM: 085  8/5/2020 Unknown        CAP (community acquired pneumonia) ICD-10-CM: J18.9  ICD-9-CM: 807  8/5/2020 Unknown        * (Principal) Acute exacerbation of chronic obstructive pulmonary disease (COPD) [facility] ICD-10-CM: J44.1  ICD-9-CM: 491.21  2/8/2019 Yes              Acute COPD exacerbation  More wheezing today   cxr is no edema   Continue IV steroid  Case discussed with Josye parr tx adjusted      Cap ? 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 Discussed the above assessments. also discussed labs, medications and hospital course    Rn: wheezing, leg swelling, glucose 164     Disposition :tbd,   Review of systems:    General: No fevers or chills. Cardiovascular: No chest pain or pressure. No palpitations. Pulmonary: shortness of breath better. Still wheezing   Gastrointestinal: No nausea, vomiting.      Vital signs/Intake and Output:  Visit Vitals  /69   Pulse 91   Temp 98 °F (36.7 °C)   Resp 25   Ht 5' 8\" (1.727 m)   Wt 92.2 kg (203 lb 3.2 oz)   SpO2 98%   BMI 30.90 kg/m²     Current Shift:  08/06 0701 - 08/06 1900  In: 600 [P.O.:600]  Out: -   Last three shifts:  08/04 1901 - 08/06 0700  In: 890 [P.O.:200; I.V.:690]  Out: 1625 [Urine:1625]    Physical Exam:  General: WD, WN. Alert, cooperative, no acute distress    HEENT: NC, Atraumatic. PERRLA, anicteric sclerae. Lungs: Wheezing. No rales   Heart:  Regular  rhythm,  No murmur, No Rubs, No Gallops  Abdomen: Soft, Non distended, Non tender. +Bowel sounds,   Extremities: No c/c/e  Psych:   Not anxious or agitated. Neurologic:  No acute neurological deficit. Labs: Results:       Chemistry Recent Labs     08/06/20 0305 08/05/20 0430 08/04/20 2154   * 169* 136*   * 129* 130*   K 3.6 3.8 3.2*   CL 96* 94* 94*   CO2 25 28 27   BUN 33* 24* 28*   CREA 1.19 1.29 1.51*   CA 8.2* 8.5 8.3*   AGAP 10 7 9   BUCR 28* 19 19   AP  --   --  98   TP  --   --  6.5   ALB  --   --  3.3*   GLOB  --   --  3.2   AGRAT  --   --  1.0      CBC w/Diff Recent Labs     08/06/20 0305 08/05/20 0430 08/04/20  2154   WBC 15.1* 15.7* 17.4*   RBC 3.45* 3.80* 3.76*   HGB 9.8* 10.8* 10.7*   HCT 30.5* 34.0* 33.2*    324 319   GRANS  --   --  81*   LYMPH  --   --  3*   EOS  --   --  8*      Cardiac Enzymes Recent Labs     08/04/20  2154      CKND1 3.2      Coagulation No results for input(s): PTP, INR, APTT, INREXT in the last 72 hours. Lipid Panel No results found for: CHOL, CHOLPOCT, CHOLX, CHLST, CHOLV, 475130, HDL, HDLP, LDL, LDLC, DLDLP, 878921, VLDLC, VLDL, TGLX, TRIGL, TRIGP, TGLPOCT, CHHD, CHHDX   BNP No results for input(s): BNPP in the last 72 hours.    Liver Enzymes Recent Labs     08/04/20  2154   TP 6.5   ALB 3.3*   AP 98      Thyroid Studies Lab Results   Component Value Date/Time    TSH 0.86 10/20/2019 01:05 AM        Procedures/imaging: see electronic medical records for all procedures/Xrays and details which were not copied into this note but were reviewed prior to creation of Plan    Xr Chest Port    Result Date: 8/6/2020  EXAM: XR CHEST PORT CLINICAL INDICATION/HISTORY: sob -Additional: None COMPARISON: 8/4/2020 TECHNIQUE: Portable frontal view of the chest _______________ FINDINGS: SUPPORT DEVICES: None. HEART AND MEDIASTINUM: Cardiomediastinal silhouette within normal limits. LUNGS AND PLEURAL SPACES: Hypoinflated lungs. Pleural plaques. Bibasilar atelectasis. No large effusion or pneumothorax. _______________     IMPRESSION: No acute cardiopulmonary abnormality. Hypoinflated lungs. Pleural plaques. Bibasilar atelectasis. Xr Chest Port    Result Date: 8/4/2020  EXAM:  AP Portable Chest X-ray 1 view INDICATION: Shortness of breath COMPARISON: June 17, 2020 and CT chest dated October 31, 2019 _______________ FINDINGS:  Heart and mediastinal contours are within normal limits for portable radiograph. Lungs are clear of active disease. Parenchymal scarring seen at the right lung base. There are emphysematous changes. There are no pleural effusions. Extensive pleural calcifications are seen on the right similar to prior exam. No acute osseous findings. ________________      IMPRESSION:  No acute process. Chronic pleural-parenchymal scarring and calcification of the pleura in the right.       Jose Penny MD

## 2020-08-06 NOTE — ROUTINE PROCESS
1914  Bedside and Verbal shift change report given to Arnol Pressley RN (oncoming nurse) by Dot Reyes RN (offgoing nurse). Report included the following information SBAR, Kardex and MAR.

## 2020-08-06 NOTE — PROGRESS NOTES
0758   Pt is awake, alert, oriented x4. Sitting at edge of bed and eating breakfast.   0830  Pt received neb tx. Pt on O2 at 1.5L nc. Breathing at 25/min. Lungs with some expiratory wheezes and rales. Pt stated did not sleep well last night . Sittting at edge of bed. Pt has edema to both LE's.   0900   Ambulated to BR  and voided and had a BM. As per pt, still having SOB. Dr Gilford Laster was made aware and will come see pt.   5011   Sitting at edge of bed. Pt has slight expiratory wheezes and rales. 1313  Pr sitting  At edge of bed. Pt stated he is having resp difficulty and wants neb tx. Pt still with expiratory wheezes and rales. Still has swelling to both LE's. Resp therapist was paged. In to give Neb treatment. [address]   Dr Gilford Laster in to see pt. Ordered  Neb tx increased to q4 hrs. Blood gas Stat, labs in Am and lasix 40 mg IV. Ordered Mucinex, Portable Xray. For palliative care consult. 1403   Portable Xray done. Mucinex po  and lasix Iv given. RT aware to come and draw ABG. Order for Pulmonology Consult in chart. 1545  Pt resting in bed with HOB at 40 degrees. Pt was able to doze off but wakes up easily. Pt breathing better and able to lie down. Still has slight expiratory wheezing. Occasionally coughs  with scant amt of sputum. Seen by Dr Silvina Quiroz. 1646   Pt sitting at edge of bed. Breathing slightly better but still with expiratory wheezes. Lasix 40 mg Iv x1 given as ordered by Dr Silvina Quiroz. RT gave neb tx on pt.    1656  Pt not diabetic but on steroids. Accucheck  164. Dr Gilford Laster aware. Will continue accuchecks and Dr Gilford Laster will f/u.    1739   Pt voided 450 ml of clear yellow urine after Lasix IV.

## 2020-08-06 NOTE — PROGRESS NOTES
Problem: Falls - Risk of  Goal: *Absence of Falls  Description: Document [facility] Fall Risk and appropriate interventions in the flowsheet.   Outcome: Progressing Towards Goal  Note: Fall Risk Interventions:  Mobility Interventions: Communicate number of staff needed for ambulation/transfer         Medication Interventions: Evaluate medications/consider consulting pharmacy, Patient to call before getting OOB, Teach patient to arise slowly

## 2020-08-06 NOTE — PROGRESS NOTES
DC Plan: Discharge home with [facility], MD follow up, family assistance    Chart reviewed. Pt admitted to tele by hospitalist for COPD. Called and spoke with pt on phone regarding dc plan. Pt lives with wife and son. Pt has RW, home oxygen thru [facility]. PCP confirmed as MD [facility]. Pt refuses rehab stating he does not want to go to [facility]. Therapy recommending HH. FOC offered for [facility]. Pt chose [facility], referral placed.   CM will cont to follow for transition of care needs [phone]

## 2020-08-06 NOTE — PROGRESS NOTES
2026 - Head to toe assessment completed at this time. Pt denies any chest pain or unusual SOB. Pt sitting at the side of bed with expiratory and inspiratory wheezing. Pt has 2L nasal canula at this time. Pt becomes dyspneic with exertion. Pt is alert and oriented times 4. Pt has non pitting edema bilaterally. Pt encouraged to call for assistance. Pt left with call light within reach, bed in the low position, and wheels locked. Will continue to monitor. Bedside and Verbal shift change report given to Aram Motta RN (oncoming nurse) by Gus Franklin RN (offgoing nurse). Report included the following information SBAR, Kardex, ED Summary, Intake/Output, Recent Results, Procedure Verification and Quality Measures.

## 2020-08-06 NOTE — PROGRESS NOTES
Problem: Falls - Risk of  Goal: *Absence of Falls  Description: Document [facility] Fall Risk and appropriate interventions in the flowsheet. Outcome: Progressing Towards Goal  Note: Fall Risk Interventions:  Mobility Interventions: Communicate number of staff needed for ambulation/transfer         Medication Interventions: Evaluate medications/consider consulting pharmacy, Patient to call before getting OOB, Teach patient to arise slowly                   Problem: Falls - Risk of  Goal: *Absence of Falls  Description: Document [facility] Fall Risk and appropriate interventions in the flowsheet.   Outcome: Progressing Towards Goal  Note: Fall Risk Interventions:  Mobility Interventions: Communicate number of staff needed for ambulation/transfer         Medication Interventions: Evaluate medications/consider consulting pharmacy, Patient to call before getting OOB, Teach patient to arise slowly

## 2020-08-06 NOTE — CONSULTS
[facility]                  Pulmonary, Critical Care, and Sleep Medicine     Name: Levi Forbes MRN: [id number]   : 1945 Hospital: [facility] [place]    Date: 2020        [facility] Note                                              Consult requesting physician: Dr. Tania Collins  Reason for Consult: COPD exacerbation. IMPRESSION:   Acute exacerbation of chronic obstructive pulmonary disease (COPD) ([facility]) J44.1     Acute respiratory failure with hypoxia and hypercarbia ([facility]) J96.01, J96.02     Tobacco dependence F17.200 ·     ·   Patient Active Problem List   Diagnosis Code    Hypertension I10    COPD (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 ([facility]) J44.9    Chronic renal disease, stage 3, moderately decreased glomerular filtration rate between 30-59 mL/min/1.73 square meter ([facility]) N18.3    Asbestosis ([address] J61    Acute exacerbation of chronic obstructive pulmonary disease (COPD) ([facility]) J44.1    Debility R53.81    Paroxysmal atrial fibrillation ([facility]) I48.0    Chronic respiratory failure with hypoxia ([facility]) J96.11    Tobacco dependence F17.200    Hyponatremia E87.1    Pleural effusion, right J90    COPD with acute exacerbation ([facility]) J44.1    Acute respiratory failure with hypoxia and hypercarbia ([facility]) J96.01, J96.02    Hyponatremia E87.1    Advanced care planning/counseling discussion Z71.89    Hypokalemia E87.6    COPD (chronic obstructive pulmonary disease) ([facility]) J44.9    CAP (community acquired pneumonia) J18.9 ·                 RECOMMENDATIONS:   Worsening breathing problem, likely multifactorial including COPD exacerbation and diastolic CHF exacerbation. Leukocytosis improving. On 1.5 LPM NC. Radiologic chest findings: No acute infiltrate. Chronic changes present. Oxygen: 2 LPM NC, keep SPO2 >91%. Bronchodilators: Continue DuoNeb every 4 hour, Brovana twice daily, Pulmicort twice daily for now.   Once he improves, change DuoNeb to Atrovent 4 times daily. Start albuterol as needed. Steroids: solumedrol reduce dose to 40 mg iv bid. Diuretics: Chronic diastolic CHF and leg edema. Will give Lasix 40×1 now. Monitor creatinine closely with history of CKD. Airway clearance: Continue incentive spirometer. Ferol Oka as needed. Culture data: Blood culture: NGTD. Antibiotics: Continue Rocephin and Zithromax for COPD exacerbation/bronchitis. FiO2 to keep SpO2 >=92%, HOB >=30 degree, aspiration precautions, aggressive pulmonary toileting, incentive spirometry, PT/OT eval and treat, mobilization. DVT Prophylaxis: Heparin  GI Prophylaxis: Low risk for stress ulcer  Other issues management by primary team and respective consultants. Further recommendations will be based on the patient's response to recommended treatment and results of the investigation ordered. Quality Care: PPI, DVT prophylaxis, HOB elevated, infection control all reviewed and addressed. Discussed with patient, radiologic work up showed, answered all questions to their satisfaction. Care plan discussed with nursing, Dr. Mariam Hawley.         Subjective/History:     Leslie Canchola is a 66 y.o. 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 No recent travel or exposure to [address] patient. No suspicion for COVID19 infection. 8/6/2020   Worsening KUMAR since last few days. Associated with coughing and wheezing. Sputum production usually is white to clear. No hemoptysis. Persistent chronic leg edema. No fever, chest pain.       Review of Systems:   HEENT: No epistaxis, no nasal drainage, no difficulty in swallowing, no redness in eyes  Respiratory: as above  Cardiovascular: no chest pain, no palpitations, +chronic leg edema, no syncope  Gastrointestinal: no abd pain, no vomiting, no diarrhea, no bleeding symptoms  Genitourinary: No urinary symptoms or hematuria  Integument/breast: No ulcers or rashes  Musculoskeletal:Neg  Neurological: No focal weakness, no seizures, no headaches  Behvioral/Psych: No anxiety, no depression  Constitutional: No fever, no chills, no weight loss, no night sweats       Immunization status:   Immunization History   Administered Date(s) Administered    Influenza High Dose Vaccine PF 09/10/2012, 11/04/2013, 10/26/2015, 08/30/2016    Influenza Vaccine 01/15/2017    Influenza Vaccine (Quad) PF 10/23/2018, 10/22/2019    Influenza Vaccine PF 10/06/2014    Pneumococcal Conjugate (PCV-13) 07/01/2015, 06/12/2018    Pneumococcal Polysaccharide (PPSV-23) 03/19/2010, 11/10/2011    Pneumococcal Vaccine (Unspecified Type) 10/01/2012, 10/01/2012          Allergies   Allergen Reactions    Aspirin Other (comments)     \"messes my stomach up\"        Past Medical History:   Diagnosis Date    Brain aneurysm     Cancer [address]     prostate    COPD (chronic obstructive pulmonary disease) [facility]     Hypertension     Nocturia     Pneumonia     Radiation effect         Past Surgical History:   Procedure Laterality Date    HX HERNIA REPAIR          Family History   Problem Relation Age of Onset    Heart Disease Mother         Social History     Tobacco Use    Smoking status: Former Smoker     Types: Cigarettes    Smokeless tobacco: Never Used   Substance Use Topics    Alcohol use: No        Prior to Admission medications    Medication Sig Start Date End Date Taking? Authorizing Provider   predniSONE (STERAPRED DS) 10 mg dose pack See administration instruction per 10mg dose pack 6/17/20   Berhane Zapata MD   albuterol (ACCUNEB) 1.25 mg/3 mL nebu Take 3 mL by inhalation every four (4) hours as needed for Wheezing (wheezing). 5/13/20   Karey Andrade DO   dilTIAZem (CARDIZEM) 30 mg tablet Take 1 Tab by mouth Before breakfast, lunch, and dinner.  4/14/20   Thi Reddy MD   chlorthalidone (HYGROTEN) 25 mg tablet Take 25 mg by mouth daily. Blayne Bran MD   acetaminophen (TYLENOL) 325 mg tablet Take 500 mg by mouth every four (4) hours as needed for Pain. Provider, Historical   OXYGEN-AIR DELIVERY SYSTEMS Take 2 L by inhalation continuous. Provider, Historical   dextran 70/hypromellose (ARTIFICIAL TEARS, PF, OP) Apply 2 Drops to eye as needed for Other (both eyes for dryness). Provider, Historical   diphenhydrAMINE (BENADRYL) 25 mg capsule Take 25 mg by mouth nightly as needed for Itching. Provider, Historical   albuterol (PROVENTIL HFA, VENTOLIN HFA, PROAIR HFA) 90 mcg/actuation inhaler Take 2 Puffs by inhalation every four (4) hours as needed for Wheezing or Shortness of Breath. 4/23/18   Elvis Mcfadden PA-C   ipratropium (ATROVENT) 0.03 % nasal spray 2 Sprays by Both Nostrils route every twelve (12) hours as needed for Rhinitis. Blayne Bran MD   tamsulosin (FLOMAX) 0.4 mg capsule Take 0.4 mg by mouth every evening.     Blayne Bran MD       Current Facility-Administered Medications   Medication Dose Route Frequency    arformoteroL (BROVANA) neb solution 15 mcg  15 mcg Nebulization BID RT    albuterol-ipratropium (DUO-NEB) 2.5 MG-0.5 MG/3 ML  3 mL Nebulization Q4H RT    guaiFENesin ER (MUCINEX) tablet 600 mg  600 mg Oral Q12H    sodium chloride (NS) flush 5-40 mL  5-40 mL IntraVENous Q8H    sodium chloride (NS) flush 5-40 mL  5-40 mL IntraVENous PRN    budesonide (PULMICORT) 500 mcg/2 ml nebulizer suspension  500 mcg Nebulization BID RT    methylPREDNISolone (PF) (Solu-MEDROL) injection 60 mg  60 mg IntraVENous Q6H    acetaminophen (TYLENOL) tablet 650 mg  650 mg Oral Q4H PRN    oxyCODONE IR (ROXICODONE) tablet 5 mg  5 mg Oral Q4H PRN    morphine injection 2 mg  2 mg IntraVENous Q4H PRN    naloxone (NARCAN) injection 0.4 mg  0.4 mg IntraVENous PRN    ondansetron (ZOFRAN) injection 4 mg  4 mg IntraVENous Q4H PRN    bisacodyL (DULCOLAX) tablet 5 mg  5 mg Oral DAILY PRN    LORazepam (ATIVAN) injection 1 mg  1 mg IntraVENous Q6H PRN    heparin (porcine) injection 5,000 Units  5,000 Units SubCUTAneous Q8H    chlorthalidone (HYGROTEN) tablet 25 mg  25 mg Oral DAILY    carboxymethylcellulose sodium (REFRESH PLUS) 0.5 % ophthalmic solution 2 Drop  2 Drop Both Eyes PRN    dilTIAZem IR (CARDIZEM) tablet 30 mg  30 mg Oral TIDAC    tamsulosin (FLOMAX) capsule 0.4 mg  0.4 mg Oral QPM    cefTRIAXone (ROCEPHIN) 1 g in sterile water (preservative free) 10 mL IV syringe  1 g IntraVENous Q24H    azithromycin (ZITHROMAX) 500 mg in 0.9% sodium chloride 250 mL (VIAL-MATE)  500 mg IntraVENous Q24H         Objective:   Vital Signs:    Visit Vitals  /69   Pulse 91   Temp 98 °F (36.7 °C)   Resp 25   Ht 5' 8\" (1.727 m)   Wt 92.2 kg (203 lb 3.2 oz)   SpO2 96%   BMI 30.90 kg/m²       O2 Device: Nasal cannula   O2 Flow Rate (L/min): 1.5 l/min   Temp (24hrs), Av.9 °F (36.6 °C), Min:97.3 °F (36.3 °C), Max:98.2 °F (36.8 °C)       Intake/Output:   Last shift:       07 -  190  In: 600 [P.O.:600]  Out: -   Last 3 shifts:  1901 -  0700  In: 890 [P.O.:200; I.V.:690]  Out: 1625 [Urine:1625]    Intake/Output Summary (Last 24 hours) at 2020 1531  Last data filed at 2020 0849  Gross per 24 hour   Intake 1290 ml   Output 775 ml   Net 515 ml       Physical Exam:     General/Neurology: Alert, Awake, NAD. Head:   Normocephalic, without obvious abnormality, atraumatic. Eye:   EOM intact, no scleral icterus, no pallor, no cyanosis. Nose:   No sinus tenderness, no erythema, no induration, no discharge  Throat:  Lips, mucosa, and tongue normal. No oral thrush. Neck:   Supple, symmetric. No carotid bruit. No lymphadenopathy. Trachea midline  Lung: Moderate air entry bilateral equal. No stridors. No prolongded expiration. No accessory muscle use. Mild expiratory wheezing. Bilateral scattered crackles at the bases. Heart:   Regular rate & rhythm. S1 S2 present. No murmur.   No JVD.  Abdomen:  Soft. NT. ND. +BS. No masses. Extremities:  Trace to 1+ bilateral leg pitting edema up to the knee. No cyanosis. No clubbing. Pulses: 2+ and symmetric in DP. Capillary refill: normal.   Lymphatic:  No cervical or supraclavicular palpable lymphadenopathy. Musculoskeletal: No joint swelling. No tenderness. Skin:   Color, texture, turgor normal. No rashes or lesions. Data:       Recent Results (from the past 24 hour(s))   METABOLIC PANEL, BASIC    Collection Time: 08/06/20  3:05 AM   Result Value Ref Range    Sodium 131 (L) 136 - 145 mmol/L    Potassium 3.6 3.5 - 5.5 mmol/L    Chloride 96 (L) 100 - 111 mmol/L    CO2 25 21 - 32 mmol/L    Anion gap 10 3.0 - 18 mmol/L    Glucose 149 (H) 74 - 99 mg/dL    BUN 33 (H) 7.0 - 18 MG/DL    Creatinine 1.19 0.6 - 1.3 MG/DL    BUN/Creatinine ratio 28 (H) 12 - 20      GFR est AA >60 >60 ml/min/1.73m2    GFR est non-AA 59 (L) >60 ml/min/1.73m2    Calcium 8.2 (L) 8.5 - 10.1 MG/DL   CBC W/O DIFF    Collection Time: 08/06/20  3:05 AM   Result Value Ref Range    WBC 15.1 (H) 4.6 - 13.2 K/uL    RBC 3.45 (L) 4.70 - 5.50 M/uL    HGB 9.8 (L) 13.0 - 16.0 g/dL    HCT 30.5 (L) 36.0 - 48.0 %    MCV 88.4 74.0 - 97.0 FL    MCH 28.4 24.0 - 34.0 PG    MCHC 32.1 31.0 - 37.0 g/dL    RDW 13.7 11.6 - 14.5 %    PLATELET 416 912 - 734 K/uL    MPV 9.1 (L) 9.2 - 11.8 FL   GLUCOSE, POC    Collection Time: 08/06/20 11:38 AM   Result Value Ref Range    Glucose (POC) 150 (H) 70 - 110 mg/dL   POC G3    Collection Time: 08/06/20  2:16 PM   Result Value Ref Range    Device: NASAL CANNULA      Flow rate (POC) 1.5 L/M    FIO2 (POC) 26 %    pH (POC) 7.43 7.35 - 7.45      pCO2 (POC) 36.9 35.0 - 45.0 MMHG    pO2 (POC) 114 (H) 80 - 100 MMHG    HCO3 (POC) 24.6 22 - 26 MMOL/L    sO2 (POC) 99 (H) 92 - 97 %    Base excess (POC) 0 mmol/L    Allens test (POC) YES      Total resp.  rate 30      Site RIGHT RADIAL      Specimen type (POC) ARTERIAL      Performed by [facility] Recent Labs     08/06/20  0305 08/05/20  0430 08/04/20  2154   * 169* 136*   * 129* 130*   K 3.6 3.8 3.2*   CL 96* 94* 94*   CO2 25 28 27   BUN 33* 24* 28*   CREA 1.19 1.29 1.51*   CA 8.2* 8.5 8.3*   AGAP 10 7 9   BUCR 28* 19 19   AP  --   --  98   TP  --   --  6.5   ALB  --   --  3.3*   GLOB  --   --  3.2   AGRAT  --   --  1.0        Lactic Acid Lactic acid   Date Value Ref Range Status   08/04/2020 1.3 0.4 - 2.0 MMOL/L Final     Recent Labs     08/04/20  2154   LAC 1.3        Liver Enzymes Protein, total   Date Value Ref Range Status   08/04/2020 6.5 6.4 - 8.2 g/dL Final     Albumin   Date Value Ref Range Status   08/04/2020 3.3 (L) 3.4 - 5.0 g/dL Final     Globulin   Date Value Ref Range Status   08/04/2020 3.2 2.0 - 4.0 g/dL Final     A-G Ratio   Date Value Ref Range Status   08/04/2020 1.0 0.8 - 1.7   Final     Alk. phosphatase   Date Value Ref Range Status   08/04/2020 98 45 - 117 U/L Final     Recent Labs     08/04/20  2154   TP 6.5   ALB 3.3*   GLOB 3.2   AGRAT 1.0   AP 98        CBC w/Diff Recent Labs     08/06/20  0305 08/05/20  0430 08/04/20  2154   WBC 15.1* 15.7* 17.4*   RBC 3.45* 3.80* 3.76*   HGB 9.8* 10.8* 10.7*   HCT 30.5* 34.0* 33.2*    324 319   GRANS  --   --  81*   LYMPH  --   --  3*   EOS  --   --  8*        Cardiac Enzymes No results found for: CPK, CK, CKMMB, CKMB, RCK3, CKMBT, CKNDX, CKND1, WILLARD, TROPT, TROIQ, DENNYS, TROPT, TNIPOC, BNP, BNPP     BNP No results found for: BNP, BNPP, XBNPT     Coagulation No results for input(s): PTP, INR, APTT, INREXT in the last 72 hours.       Thyroid  Lab Results   Component Value Date/Time    TSH 0.86 10/20/2019 01:05 AM       No results found for: T4     Urinalysis Lab Results   Component Value Date/Time    Color YELLOW 08/05/2020 03:40 AM    Appearance CLEAR 08/05/2020 03:40 AM    Specific gravity 1.015 08/05/2020 03:40 AM    pH (UA) 5.5 08/05/2020 03:40 AM    Protein Negative 08/05/2020 03:40 AM    Glucose Negative 08/05/2020 03:40 AM Ketone Negative 08/05/2020 03:40 AM    Bilirubin Negative 08/05/2020 03:40 AM    Urobilinogen 1.0 08/05/2020 03:40 AM    Nitrites Negative 08/05/2020 03:40 AM    Leukocyte Esterase Negative 08/05/2020 03:40 AM    Bacteria FEW (A) 09/08/2018 12:30 PM    WBC 0 to 3 09/08/2018 12:30 PM    RBC 0 to 3 09/08/2018 12:30 PM        Micro  Recent Labs     08/04/20  2215 08/04/20  2154   CULT NO GROWTH 1 DAY NO GROWTH 2 DAYS     Recent Labs     08/04/20  2215 08/04/20  2154   CULT NO GROWTH 1 DAY NO GROWTH 2 DAYS        ABG Recent Labs     08/06/20  1416   PHI 7.43   PCO2I 36.9   PO2I 114*   HCO3I 24.6   FIO2I 26          Echo 6/15/20:  Result status: Final result    · Image quality for this study was technically difficult. Contrast used: DEFINITY. · Normal cavity size, wall thickness and systolic function (ejection fraction normal). Estimated left ventricular ejection fraction is 60 - 65%. Mild (grade 1) left ventricular diastolic dysfunction E/E' ratio is 11. 16.  · Mildly dilated left atrium. · Mildly dilated right ventricle. · Mildly dilated right atrium. · Probably trileaflet aortic valve. · Mitral valve non-specific thickening. Mild mitral annular calcification. Trace mitral valve regurgitation is present. · Pulmonary arterial systolic pressure is 25 mmHg. Pulmonary hypertension not suggested by Doppler findings. · Severely elevated central venous pressure (15+ mmHg); IVC diameter is larger than 21 mm and collapses less than 50% with respiration. CT (Most Recent) (CT chest reviewed by me) Results from Hospital Encounter encounter on 05/29/20   CT LOW DOSE LUNG CANCER SCREENING    Narrative EXAM: CT Chest, lung cancer screening    CLINICAL INDICATION/HISTORY: Lung cancer screening, cigarette/nicotine  dependence. COMPARISON: CTA of the chest 10/31/2019. TECHNIQUE: Chest CT from thoracic inlet through the diaphragm without contrast.  A low-dose lung cancer screening protocol was performed.  Note that visualization  of non-pulmonary soft tissue structures is limited with this protocol. Coronal  and sagittal reformats were generated and reviewed. One or more dose reduction  techniques were used on this CT: automated exposure control, adjustment of the  mAs and/or kVp according to patient size, and iterative reconstruction  techniques. The specific techniques used on this CT exam have been documented  in the patient's electronic medical record. Digital Imaging and Communications  in Medicine (DICOM) format image data are available to nonaffiliated external  healthcare facilities or entities on a secure, media free, reciprocally  searchable basis with patient authorization for at least a 12-month period after  this study. DLP: 67    _______________    FINDINGS:    LUNGS:    Nodules: No suspicious nodule. Other pulmonary findings:  Focally calcified area of soft tissue thickening along the right pleural margin  both anteriorly and posteriorly is unchanged in appearance since prior  examination. OTHER:     Coronary artery ossifications are present.     _______________        Impression IMPRESSION:    No suspicious pulmonary nodule. Focal areas of peripheral pleural thickening  with peripheral calcification is unchanged in appearance since prior  examinations. Lung-RADS 1 - Negative. Management: Continue annual screening with low dose CT in 12 months. Lung cancer screening categorization and recommendations per the [employer] of radiology Lung-RADS version 1.1. XR (Most Recent). CXR  reviewed by me and compared with previous CXR Results from Hospital Encounter encounter on 08/04/20   XR CHEST PORT    Narrative EXAM: XR CHEST PORT    CLINICAL INDICATION/HISTORY: sob  -Additional: None    COMPARISON: 8/4/2020    TECHNIQUE: Portable frontal view of the chest    _______________    FINDINGS:    SUPPORT DEVICES: None.     HEART AND MEDIASTINUM: Cardiomediastinal silhouette within normal limits. LUNGS AND PLEURAL SPACES: Hypoinflated lungs. Pleural plaques. Bibasilar  atelectasis. No large effusion or pneumothorax.    _______________      Impression IMPRESSION:    No acute cardiopulmonary abnormality. Hypoinflated lungs. Pleural plaques. Bibasilar atelectasis.           Twan Bah MD  8/6/2020

## 2020-08-07 LAB
ANION GAP SERPL CALC-SCNC: 12 MMOL/L (ref 3–18)
BUN SERPL-MCNC: 41 MG/DL (ref 7–18)
BUN/CREAT SERPL: 28 (ref 12–20)
CALCIUM SERPL-MCNC: 8.1 MG/DL (ref 8.5–10.1)
CHLORIDE SERPL-SCNC: 94 MMOL/L (ref 100–111)
CO2 SERPL-SCNC: 28 MMOL/L (ref 21–32)
CREAT SERPL-MCNC: 1.49 MG/DL (ref 0.6–1.3)
ERYTHROCYTE [DISTWIDTH] IN BLOOD BY AUTOMATED COUNT: 13.3 % (ref 11.6–14.5)
GLUCOSE BLD STRIP.AUTO-MCNC: 153 MG/DL (ref 70–110)
GLUCOSE BLD STRIP.AUTO-MCNC: 156 MG/DL (ref 70–110)
GLUCOSE BLD STRIP.AUTO-MCNC: 169 MG/DL (ref 70–110)
GLUCOSE BLD STRIP.AUTO-MCNC: 187 MG/DL (ref 70–110)
GLUCOSE SERPL-MCNC: 156 MG/DL (ref 74–99)
HCT VFR BLD AUTO: 30.5 % (ref 36–48)
HGB BLD-MCNC: 10.1 G/DL (ref 13–16)
MCH RBC QN AUTO: 29 PG (ref 24–34)
MCHC RBC AUTO-ENTMCNC: 33.1 G/DL (ref 31–37)
MCV RBC AUTO: 87.6 FL (ref 74–97)
PLATELET # BLD AUTO: 343 K/UL (ref 135–420)
PMV BLD AUTO: 8.9 FL (ref 9.2–11.8)
POTASSIUM SERPL-SCNC: 3 MMOL/L (ref 3.5–5.5)
RBC # BLD AUTO: 3.48 M/UL (ref 4.7–5.5)
SODIUM SERPL-SCNC: 134 MMOL/L (ref 136–145)
WBC # BLD AUTO: 11.6 K/UL (ref 4.6–13.2)

## 2020-08-07 PROCEDURE — 80048 BASIC METABOLIC PNL TOTAL CA: CPT

## 2020-08-07 PROCEDURE — 65660000000 HC RM CCU STEPDOWN

## 2020-08-07 PROCEDURE — 74011250637 HC RX REV CODE- 250/637: Performed by: HOSPITALIST

## 2020-08-07 PROCEDURE — 74011250636 HC RX REV CODE- 250/636: Performed by: FAMILY MEDICINE

## 2020-08-07 PROCEDURE — 82962 GLUCOSE BLOOD TEST: CPT

## 2020-08-07 PROCEDURE — 74011000250 HC RX REV CODE- 250: Performed by: FAMILY MEDICINE

## 2020-08-07 PROCEDURE — 77010033678 HC OXYGEN DAILY

## 2020-08-07 PROCEDURE — 97116 GAIT TRAINING THERAPY: CPT

## 2020-08-07 PROCEDURE — 85027 COMPLETE CBC AUTOMATED: CPT

## 2020-08-07 PROCEDURE — 74011000250 HC RX REV CODE- 250: Performed by: INTERNAL MEDICINE

## 2020-08-07 PROCEDURE — 74011636637 HC RX REV CODE- 636/637: Performed by: FAMILY MEDICINE

## 2020-08-07 PROCEDURE — 94760 N-INVAS EAR/PLS OXIMETRY 1: CPT

## 2020-08-07 PROCEDURE — 74011250636 HC RX REV CODE- 250/636: Performed by: INTERNAL MEDICINE

## 2020-08-07 PROCEDURE — 74011000250 HC RX REV CODE- 250: Performed by: HOSPITALIST

## 2020-08-07 PROCEDURE — 36415 COLL VENOUS BLD VENIPUNCTURE: CPT

## 2020-08-07 PROCEDURE — 74011250637 HC RX REV CODE- 250/637: Performed by: FAMILY MEDICINE

## 2020-08-07 PROCEDURE — 94640 AIRWAY INHALATION TREATMENT: CPT

## 2020-08-07 RX ORDER — INSULIN LISPRO 100 [IU]/ML
INJECTION, SOLUTION INTRAVENOUS; SUBCUTANEOUS
Status: DISCONTINUED | OUTPATIENT
Start: 2020-08-07 | End: 2020-08-10 | Stop reason: HOSPADM

## 2020-08-07 RX ORDER — IPRATROPIUM BROMIDE AND ALBUTEROL SULFATE 2.5; .5 MG/3ML; MG/3ML
3 SOLUTION RESPIRATORY (INHALATION)
Status: ON HOLD | COMMUNITY
End: 2020-08-07

## 2020-08-07 RX ORDER — METHYLPREDNISOLONE 4 MG/1
TABLET ORAL
Status: ON HOLD | COMMUNITY
End: 2020-08-07

## 2020-08-07 RX ORDER — DOXYCYCLINE 100 MG/1
100 CAPSULE ORAL EVERY 12 HOURS
Status: ON HOLD | COMMUNITY
End: 2020-08-07

## 2020-08-07 RX ORDER — FUROSEMIDE 20 MG/1
20 TABLET ORAL DAILY
Status: ON HOLD | COMMUNITY
End: 2022-04-15 | Stop reason: SDUPTHER

## 2020-08-07 RX ORDER — POTASSIUM CHLORIDE 20 MEQ/1
40 TABLET, EXTENDED RELEASE ORAL 2 TIMES DAILY
Status: COMPLETED | OUTPATIENT
Start: 2020-08-07 | End: 2020-08-07

## 2020-08-07 RX ORDER — TIOTROPIUM BROMIDE AND OLODATEROL 3.124; 2.736 UG/1; UG/1
2 SPRAY, METERED RESPIRATORY (INHALATION) DAILY
COMMUNITY
End: 2020-08-10

## 2020-08-07 RX ORDER — FLUTICASONE PROPIONATE 50 MCG
2 SPRAY, SUSPENSION (ML) NASAL
COMMUNITY
End: 2020-08-10

## 2020-08-07 RX ADMIN — Medication 10 ML: at 21:52

## 2020-08-07 RX ADMIN — DILTIAZEM HYDROCHLORIDE 30 MG: 30 TABLET, FILM COATED ORAL at 16:54

## 2020-08-07 RX ADMIN — POTASSIUM CHLORIDE 40 MEQ: 1500 TABLET, EXTENDED RELEASE ORAL at 21:51

## 2020-08-07 RX ADMIN — METHYLPREDNISOLONE SODIUM SUCCINATE 40 MG: 40 INJECTION, POWDER, FOR SOLUTION INTRAMUSCULAR; INTRAVENOUS at 09:50

## 2020-08-07 RX ADMIN — ALBUTEROL SULFATE 2.5 MG: 2.5 SOLUTION RESPIRATORY (INHALATION) at 00:54

## 2020-08-07 RX ADMIN — HEPARIN SODIUM 5000 UNITS: 5000 INJECTION INTRAVENOUS; SUBCUTANEOUS at 21:52

## 2020-08-07 RX ADMIN — INSULIN LISPRO 2 UNITS: 100 INJECTION, SOLUTION INTRAVENOUS; SUBCUTANEOUS at 22:17

## 2020-08-07 RX ADMIN — DILTIAZEM HYDROCHLORIDE 30 MG: 30 TABLET, FILM COATED ORAL at 07:00

## 2020-08-07 RX ADMIN — AZITHROMYCIN 500 MG: 500 INJECTION, POWDER, LYOPHILIZED, FOR SOLUTION INTRAVENOUS at 01:15

## 2020-08-07 RX ADMIN — METHYLPREDNISOLONE SODIUM SUCCINATE 40 MG: 40 INJECTION, POWDER, FOR SOLUTION INTRAMUSCULAR; INTRAVENOUS at 21:52

## 2020-08-07 RX ADMIN — Medication 10 ML: at 16:55

## 2020-08-07 RX ADMIN — IPRATROPIUM BROMIDE AND ALBUTEROL SULFATE 3 ML: .5; 3 SOLUTION RESPIRATORY (INHALATION) at 15:24

## 2020-08-07 RX ADMIN — ARFORMOTEROL TARTRATE 15 MCG: 15 SOLUTION RESPIRATORY (INHALATION) at 07:17

## 2020-08-07 RX ADMIN — GUAIFENESIN 600 MG: 600 TABLET, EXTENDED RELEASE ORAL at 21:51

## 2020-08-07 RX ADMIN — POTASSIUM CHLORIDE 40 MEQ: 1500 TABLET, EXTENDED RELEASE ORAL at 12:25

## 2020-08-07 RX ADMIN — TAMSULOSIN HYDROCHLORIDE 0.4 MG: 0.4 CAPSULE ORAL at 17:05

## 2020-08-07 RX ADMIN — IPRATROPIUM BROMIDE AND ALBUTEROL SULFATE 3 ML: .5; 3 SOLUTION RESPIRATORY (INHALATION) at 19:49

## 2020-08-07 RX ADMIN — DILTIAZEM HYDROCHLORIDE 30 MG: 30 TABLET, FILM COATED ORAL at 12:25

## 2020-08-07 RX ADMIN — Medication 10 ML: at 05:20

## 2020-08-07 RX ADMIN — ALBUTEROL SULFATE 2.5 MG: 2.5 SOLUTION RESPIRATORY (INHALATION) at 04:58

## 2020-08-07 RX ADMIN — BUDESONIDE 500 MCG: 0.5 INHALANT RESPIRATORY (INHALATION) at 07:17

## 2020-08-07 RX ADMIN — ARFORMOTEROL TARTRATE 15 MCG: 15 SOLUTION RESPIRATORY (INHALATION) at 19:50

## 2020-08-07 RX ADMIN — IPRATROPIUM BROMIDE AND ALBUTEROL SULFATE 3 ML: .5; 3 SOLUTION RESPIRATORY (INHALATION) at 07:17

## 2020-08-07 RX ADMIN — CHLORTHALIDONE 25 MG: 25 TABLET ORAL at 09:49

## 2020-08-07 RX ADMIN — IPRATROPIUM BROMIDE AND ALBUTEROL SULFATE 3 ML: .5; 3 SOLUTION RESPIRATORY (INHALATION) at 00:54

## 2020-08-07 RX ADMIN — CEFTRIAXONE 1 G: 1 INJECTION, POWDER, FOR SOLUTION INTRAMUSCULAR; INTRAVENOUS at 01:15

## 2020-08-07 RX ADMIN — HEPARIN SODIUM 5000 UNITS: 5000 INJECTION INTRAVENOUS; SUBCUTANEOUS at 12:26

## 2020-08-07 RX ADMIN — HEPARIN SODIUM 5000 UNITS: 5000 INJECTION INTRAVENOUS; SUBCUTANEOUS at 05:18

## 2020-08-07 RX ADMIN — GUAIFENESIN 600 MG: 600 TABLET, EXTENDED RELEASE ORAL at 09:49

## 2020-08-07 RX ADMIN — IPRATROPIUM BROMIDE AND ALBUTEROL SULFATE 3 ML: .5; 3 SOLUTION RESPIRATORY (INHALATION) at 23:26

## 2020-08-07 RX ADMIN — IPRATROPIUM BROMIDE AND ALBUTEROL SULFATE 3 ML: .5; 3 SOLUTION RESPIRATORY (INHALATION) at 11:14

## 2020-08-07 RX ADMIN — IPRATROPIUM BROMIDE AND ALBUTEROL SULFATE 3 ML: .5; 3 SOLUTION RESPIRATORY (INHALATION) at 04:58

## 2020-08-07 RX ADMIN — BUDESONIDE 500 MCG: 0.5 INHALANT RESPIRATORY (INHALATION) at 19:50

## 2020-08-07 NOTE — PROGRESS NOTES
1921 -  Head to toe assessment performed at this time. Pt denies any chest pain or SOB. Pt denies any numbness or tingling to extremities Pt educated on the side effects of medications taken. Pt left with call light within reach and bed in low position. Will continue to monitor.

## 2020-08-07 NOTE — PROGRESS NOTES
Problem: Mobility Impaired (Adult and Pediatric)  Goal: *Acute Goals and Plan of Care (Insert Text)  Description: Physical Therapy Goals   Initiated 8/5/2020 and to be accomplished within 7 day(s)  1. Patient will move from supine <> sit with S in prep for out of bed activity and change of position. 2.  Patient will perform sit<> stand with S/RW in prep for transfers/ambulation. 3.  Patient will ambulate 100 feet with S/RW for improved functional mobility at discharge. 5.  Patient will ascend/descend 3-5 stairs with handrail(s) with CGA/S for home re-entry as needed. Outcome: Progressing Towards Goal     PHYSICAL THERAPY TREATMENT    Patient: Su Villarreal (12 y.o. male)  Date: 8/6/2020  Diagnosis: COPD (chronic obstructive pulmonary disease) ([facility] [J44.9]   Acute exacerbation of chronic obstructive pulmonary disease (COPD) ([facility] [address]       Precautions: Fall   Chart, physical therapy assessment, plan of care and goals were reviewed. ASSESSMENT:  Patient demonstrates mobility that appears to be close to his baseline. Ambulation is slow, But patient paces himself in a self limiting fashion. Patient has continuous complaint of shortness of breath, But has no significant drop in SP02 (93-98% with RA). Should be fine for home discharge when medically appropriate  Progression toward goals:  [x]      Improving appropriately and progressing toward goals  []      Improving slowly and progressing toward goals  []      Not making progress toward goals and plan of care will be adjusted     PLAN:  Patient continues to benefit from skilled intervention to address the above impairments. Continue treatment per established plan of care. Discharge Recommendations:  Home Health  Further Equipment Recommendations for Discharge:  rolling walker and N/A     SUBJECTIVE:   Patient stated I really don't feel like it.     OBJECTIVE DATA SUMMARY:   Critical Behavior:  Neurologic State: Alert, Appropriate for age  Orientation Level: Oriented X4  Cognition: Appropriate for age attention/concentration  Safety/Judgement: Awareness of environment, Fall prevention  Functional Mobility Training:  Bed Mobility:  Rolling: Supervision  Supine to Sit: Supervision  Scooting: Supervision  Transfers:  Sit to Stand: Stand-by assistance  Stand to Sit: Stand-by assistance  Balance:  Sitting: Intact  Standing: Intact; With support  Ambulation/Gait Training:  Distance (ft): 120 Feet (ft)  Assistive Device: Gait belt;Walker, rolling  Ambulation - Level of Assistance: Contact guard assistance  Gait Abnormalities: Decreased step clearance  Base of Support: Narrowed  Step Length: Right shortened;Left shortened  Interventions: Safety awareness training  Pain:  Pain Scale 1: Numeric (0 - 10)  Pain Intensity 1: 0  Pain out: 0  Activity Tolerance:   Good  Please refer to the flowsheet for vital signs taken during this treatment.   After treatment:   [x] Patient left in no apparent distress sitting EOB  [] Patient left in no apparent distress in bed  [x] Call bell left within reach  [x] Nursing notified  [] Caregiver present  [] Bed alarm activated      Dayton Chang PTA   Time Calculation: 25 mins

## 2020-08-07 NOTE — PROGRESS NOTES
Occupational Therapy Treatment Attempt    Chart reviewed. Attempted Occupational Therapy Treatment, however, patient unable to be seen due to:  []  Nausea/vomiting  [x]  Eating sitting on the EOB  []  Pain  []  Patient too lethargic  []  Off Unit for testing/procedure  []  Dialysis treatment in progress  []  Telemetry Results  []  Other:     Will f/u later as patient's schedule allows.    Thank you for this referral.  Brent Tillman OTR/[facility]

## 2020-08-07 NOTE — CONSULTS
[facility]: [phone] ([phone])  [facility]: [phone]   [facility]: [phone]    Patient Name: Miryam Jimenez  YOB: 1945    Date of Initial Consult: 8/7/2020  Reason for Consult: care decisions  Requesting Provider: Tanisha Mcfadden MD   Primary Care Physician: Shawna Slaughter MD      SUMMARY:   Miryam Jimenez is a 66 y.o. male with a past history of COPD, HTN, CKD, asbestosis, A-fib, prostate cancer, brain aneurysm, severe [place] who was admitted on 8/4/2020 from home with a diagnosis of exacerbation of COPD. Current medical issues leading to Palliative Medicine involvement include: COPD and goals of care. PALLIATIVE DIAGNOSES:   1. Advanced care decisions  2. Exacerbation of COPD  3. Acute on chronic respiratory failure  4. Debility     PLAN:   1. Advanced care decisions: Palliative care team including TAMI Mistry and this NP met with patient at bedside. He is alert, oriented and able to make own complex healthcare decisions. Patient has AMD and a POST on file. Confirmed his wife remains his primary MPOA. Reinstructed patient on the benefits and risks of CPR in the event of cardiopulmonary arrest as well as intubation for respiratory distress. Patient states that if he has arrest, \"leave him go\". He restated his wishes for a two week trial of intubation. We reminded him he would be high risk for being unable to be liberated from ventilator should he be intubated. He has been intubated in the past and is willing to take that risk. We also discussed benefits and burdens of feeding tubes and he said he \"never wants a feeding tube\". POST form dated 11/1/2019 revoked. New POST form completed and copies placed on chart. GOALS OF CARE: DNR, okay with two week trial intubation, no feeding tubes. Dr. Keshia High and CAITLIN Falk Cover informed of change in code status.    2. Exacerbation of COPD: Patient has had multiple previous visits to ED (7) and admissions this year (3). Has history of asbestosis. Primary team managing. 3. Acute on chronic respiratory failure: on home oxygen of 3 LPM. Found to be with oxygen saturations in the 70's at home. Placed on NRB and brought to ED. 4. Debility: secondary to pulmonary illness. 5. Initial consult note routed to primary continuity provider  6. Communicated plan of care with: Palliative IDT    GOALS OF CARE:  Patient/Health Care Proxy Stated Goals: Prolong life      TREATMENT PREFERENCES:   Code Status: Partial Code, no CPR for cardiopulmonary arrest    Advance Care Planning:  Advance Care Planning 8/7/2020   Patient's Healthcare Decision Maker is: -   Primary Decision Maker Name -   Primary Decision Maker Phone Number -   Primary Decision Maker Relationship to Patient -   Confirm Advance Directive Yes, on file   Patient Would Like to Complete Advance Directive -   Does the patient have other document types -       Medical Interventions: Limited additional interventions   Other Instructions: Please intubate for respiratory distress, up to two week trial. No tracheostomy. Artificially Administered Nutrition: No feeding tube     Other: As far as possible, the palliative care team has discussed with patient/health care proxy about goals of care/treatment preferences for patient. HISTORY:     History obtained from: chart    CHIEF COMPLAINT: dyspnea    HPI/SUBJECTIVE:    The patient is:   [x] Verbal and participatory  [] Non-participatory due to:   Patient very [address] Denies pain and nausea. States always short of breath but it is MUCH improved since admission.      Clinical Pain Assessment (nonverbal scale for nonverbal patients): Clinical Pain Assessment  Severity: 0          Duration: for how long has pt been experiencing pain (e.g., 2 days, 1 month, years)  Frequency: how often pain is an issue (e.g., several times per day, once every few days, constant)     FUNCTIONAL ASSESSMENT:     Palliative Performance Scale (PPS):  PPS: 50    ECOG  ECOG Status : Ambulatory, but unable to carry out work activities     PSYCHOSOCIAL/SPIRITUAL SCREENING:      Any spiritual / Orthodoxy concerns:  [] Yes /  [x] No    Caregiver Burnout:  [] Yes /  [] No /  [x] No Caregiver Present      Anticipatory grief assessment:   [x] Normal  / [] Maladaptive        REVIEW OF SYSTEMS:     Positive and pertinent negative findings in ROS are noted above in HPI. The following systems were [x] reviewed / [] unable to be reviewed as noted in HPI  Other findings are noted below. Systems: constitutional, ears/nose/mouth/throat, respiratory, gastrointestinal, genitourinary, musculoskeletal, integumentary, neurologic, psychiatric, endocrine. Positive findings noted below. Modified ESAS Completed by: provider           Pain: 0     Nausea: 0     Dyspnea: 7           Stool Occurrence(s): 1        PHYSICAL EXAM:     Wt Readings from Last 3 Encounters:   08/06/20 98.8 kg (217 lb 12.8 oz)   06/15/20 96.2 kg (212 lb)   05/13/20 90.7 kg (200 lb)     Blood pressure 131/74, pulse 81, temperature 97.5 °F (36.4 °C), resp. rate 24, height 5' 8\" (1.727 m), weight 98.8 kg (217 lb 12.8 oz), SpO2 97 %. Pain:  Pain Scale 1: Numeric (0 - 10)  Pain Intensity 1: 0     Pain Location 1: Shoulder  Pain Orientation 1: Right(at rest; increases to 8/10 with movement)  Pain Description 1: Aching       Constitutional: Older obese gentleman, sitting on side of bed, in no apparent distress.    Eyes: pupils equal, anicteric  ENMT: no nasal discharge, moist mucous membranes  Respiratory: breathing mildly labored, audible wheezing  Musculoskeletal: no deformity, no tenderness to palpation  Skin: warm, dry  Neurologic: following commands, moving all extremities  Psychiatric: full affect, no hallucinations     HISTORY:     Principal Problem:    Acute exacerbation of chronic obstructive pulmonary disease (COPD) ([facility] [address] (2/8/2019)    Active Problems:    COPD (chronic obstructive pulmonary disease) ([facility] (8/5/2020)      CAP (community acquired pneumonia) (8/5/2020)      Past Medical History:   Diagnosis Date    Brain aneurysm     Cancer [facility])     prostate    COPD (chronic obstructive pulmonary disease) ([facility]     Hypertension     Nocturia     Pneumonia     Radiation effect       Past Surgical History:   Procedure Laterality Date    HX HERNIA REPAIR        Family History   Problem Relation Age of Onset    Heart Disease Mother      History reviewed, no pertinent family history.   Social History     Tobacco Use    Smoking status: Former Smoker     Types: Cigarettes    Smokeless tobacco: Never Used   Substance Use Topics    Alcohol use: No     Allergies   Allergen Reactions    Aspirin Other (comments)     \"messes my stomach up\"      Current Facility-Administered Medications   Medication Dose Route Frequency    potassium chloride (K-DUR, KLOR-CON) SR tablet 40 mEq  40 mEq Oral BID    arformoteroL (BROVANA) neb solution 15 mcg  15 mcg Nebulization BID RT    albuterol-ipratropium (DUO-NEB) 2.5 MG-0.5 MG/3 ML  3 mL Nebulization Q4H RT    guaiFENesin ER (MUCINEX) tablet 600 mg  600 mg Oral Q12H    methylPREDNISolone (PF) (SOLU-MEDROL) injection 40 mg  40 mg IntraVENous Q12H    albuterol CONCENTRATE 2.5mg/0.5 mL neb soln  2.5 mg Nebulization Q4H PRN    sodium chloride (NS) flush 5-40 mL  5-40 mL IntraVENous Q8H    sodium chloride (NS) flush 5-40 mL  5-40 mL IntraVENous PRN    budesonide (PULMICORT) 500 mcg/2 ml nebulizer suspension  500 mcg Nebulization BID RT    acetaminophen (TYLENOL) tablet 650 mg  650 mg Oral Q4H PRN    oxyCODONE IR (ROXICODONE) tablet 5 mg  5 mg Oral Q4H PRN    morphine injection 2 mg  2 mg IntraVENous Q4H PRN    naloxone (NARCAN) injection 0.4 mg  0.4 mg IntraVENous PRN    ondansetron (ZOFRAN) injection 4 mg  4 mg IntraVENous Q4H PRN    bisacodyL (DULCOLAX) tablet 5 mg  5 mg Oral DAILY PRN    LORazepam (ATIVAN) injection 1 mg  1 mg IntraVENous Q6H PRN    heparin (porcine) injection 5,000 Units  5,000 Units SubCUTAneous Q8H    chlorthalidone (HYGROTEN) tablet 25 mg  25 mg Oral DAILY    carboxymethylcellulose sodium (REFRESH PLUS) 0.5 % ophthalmic solution 2 Drop  2 Drop Both Eyes PRN    dilTIAZem IR (CARDIZEM) tablet 30 mg  30 mg Oral TIDAC    tamsulosin (FLOMAX) capsule 0.4 mg  0.4 mg Oral QPM    cefTRIAXone (ROCEPHIN) 1 g in sterile water (preservative free) 10 mL IV syringe  1 g IntraVENous Q24H    azithromycin (ZITHROMAX) 500 mg in 0.9% sodium chloride 250 mL (VIAL-MATE)  500 mg IntraVENous Q24H        LAB AND IMAGING FINDINGS:     Lab Results   Component Value Date/Time    WBC 11.6 08/07/2020 05:00 AM    HGB 10.1 (L) 08/07/2020 05:00 AM    PLATELET 189 76/11/6968 05:00 AM     Lab Results   Component Value Date/Time    Sodium 134 (L) 08/07/2020 05:00 AM    Potassium 3.0 (L) 08/07/2020 05:00 AM    Chloride 94 (L) 08/07/2020 05:00 AM    CO2 28 08/07/2020 05:00 AM    BUN 41 (H) 08/07/2020 05:00 AM    Creatinine 1.49 (H) 08/07/2020 05:00 AM    Calcium 8.1 (L) 08/07/2020 05:00 AM    Magnesium 2.0 05/13/2020 03:45 PM    Phosphorus 4.2 10/21/2019 01:15 AM      Lab Results   Component Value Date/Time    Alk.  phosphatase 98 08/04/2020 09:54 PM    Protein, total 6.5 08/04/2020 09:54 PM    Albumin 3.3 (L) 08/04/2020 09:54 PM    Globulin 3.2 08/04/2020 09:54 PM     Lab Results   Component Value Date/Time    INR 0.9 07/09/2019 05:20 AM    Prothrombin time 12.3 07/09/2019 05:20 AM    aPTT 110.0 (H) 11/01/2019 04:40 AM      No results found for: IRON, FE, TIBC, IBCT, PSAT, FERR   No results found for: PH, PCO2, PO2  No components found for: [place]   Lab Results   Component Value Date/Time     08/04/2020 09:54 PM    CK - MB 3.8 (H) 08/04/2020 09:54 PM              Total time: 70 minutes   Counseling / coordination time, spent as noted above > 50% counseling / coordination: 60 minutes with patient, MD, RN    Prolonged service was provided for  []30 min   []75 min in face to face time in the presence of the patient, spent as noted above. Time Start:   Time End:   Note: this can only be billed with 26734 (initial) or 91586 (follow up). If multiple start / stop times, list each separately.

## 2020-08-07 NOTE — ROUTINE PROCESS
Bedside and Verbal shift change report given to MEGHNA Wallis R.N. (oncoming nurse) by CHRIS Grewal R.N. (offgoing nurse). Report included the following information SBAR, Kardex, ED Summary, Intake/Output, MAR, Accordion, Recent Results and Med Rec Status.

## 2020-08-07 NOTE — ACP (ADVANCE CARE PLANNING)
Patient has advanced directive on file naming his wife Dora Gonzalez primary healthcare agent then daughter Moisés Martins secondary healthcare agent. Patient had executed a POST in Nov 2019 to state FULL code and FULL intervention along with feeding tube for trial period. Palliative team met with Mr. Aponte Marrow 8/7/2020 and after discussion he wishes to revoke POST form from Nov 2019 and make out new one. New POST signed on 8/7/2020 states DNR, FULL (intubation for 2 week trial but no Trach), and No feeding tubes. Placed both revoked POST and new POST on paper chart for scanning. Provided patient original and copies. Attempted to call wife Alan Sanders ([phone]) to inform of new POST. No answer left vm.      Code status: DNR    MPOA: Dora Gonzalez (wife)  [phone]    2nd MPOA: Moissé Martins (daughter)  [phone]

## 2020-08-07 NOTE — ADVANCED PRACTICE NURSE
[facility]  Clinical Nurse Specialist Rounding Note      NAME: Vijay Laughlin  MRN: [id number]  AGE: 66 y.o., : 1945  DATE OF ADMISSION: 2020  Rounding Date and Time: 2020 11:59 AM  Attending Provider: Dilan Malik, *  Reason for Admission: Shortness of Breath and COPD    Primary Diagnosis: Acute exacerbation of chronic obstructive pulmonary disease (COPD) ([facility] [address]   Code Status: Full Code  Allergies: Allergies   Allergen Reactions    Aspirin Other (comments)     \"messes my stomach up\"       Assessment/Plan:  1. SoB r/t COPD with hx asbestosis. Also CHF a factor? Intermittent wheezing, crackles in bases, shortness of breath, and productive cough. 2. Neb tx inpatient:Arformeterol 15 mcg BID, Duo-neb 2.5 - 0.5 q4h PRN, Albuterol 2.5/0.5, Budeonide 500 mcg/2ml BID. 3. Home meds - need clarification. Apparently Albuterol neb and Stiolto. 4. Inhaler teaching - performs correct usage of Respimat inhaler. 5. COPD management teaching - reason that he does not follow up is because he dislikes doctors and hospitals, and he does not easily have money to pay co-pays for provider visits. a. Informed him that is likely more expensive to wait and be readmitted at the hospital every few weeks than it is to keep up with appointments. He is willing to do follow-up after discharge from hospital.   b. Home with mold - recommended to have some assistance to clean mold. 6. CHF is not listed in his hx - need proBNP, echocaradiogram?     7. Hypokalemia. 3.0 today. Consistently low since admission. Received Lasix 40 mg x2 IV yesterday. Also, pt reportedly takes a diuretic at home but it is not on his PTA med list.   a. Potassium PO supplmement daily. Discussed with Dr. Roberto Schultz.   b. Nutrition - denies nausea, change in appetite. RN to monitor intake and output q8h.   c. Consider nutritionist consult if pt eats <50% of meals. 8. Transition of care in older adult. ACP - states one is on file. Mobility - independendent. Nutrition - good appetite. Elimination - independent. Vaccinations - pt unsure of both influenza and pneumonia vaccinations. Subjective:    Chief Complaint: SoB  Verbatim: \"I feel a little better since yesterday\"    HPI: Liat Coburn is a 66 y.o. male who presents with shortness of breath. This SoB has been present 2 days prior to admission and continued to worsen. Despite his SoB, he did not want to see his doctor, and ultimately waited to come to the emergency room. The reason that he did not want to see his provider nor come to the hospital is because he did not have money for the visit's co-pay. He was admitted by Dr. Imer Sandy on 8/4/2020, and Dr. Yesenia Campbell was consulted. Today, he feels his breathing is better after receiving lasix yesterday. His hx includes prostate CA, Htn, brain aneurysm, PNA, COPD. Pt was consulted by nursing for COPD education and coordination of care. His PCP is Dr. Davie Wolfe with [facility], and his pulmonologist is Dr. Kari Ruiz. Regarding his COPD, he states he has a nebulizer at home, which he uses only albuterol. He also believes he has Stiolto at home, which he was started on about one and a half weeks ago when he last saw Dr. Kari Ruiz. Unfortunately he cannot recall the name of his medication and instead directs to his PCP's office. As for his social history, he states quitting smoking about 1 year ago. He lives in a mobile trailer with his wife and 28year old son, who he says are available to help him. When asked for mold or dust at home, he states that he may have some mold. Hx:   Past Medical History:   Diagnosis Date    Brain aneurysm     Cancer ([address]     prostate    COPD (chronic obstructive pulmonary disease) ([address]     Hypertension     Nocturia     Pneumonia     Radiation effect           Objective:    Physical Assessment:    General: Alert. Oriented to Person, Place, Time and Situation. Grunting and sitting upright.      HEENT: PERRLA. Not hard of hearing. Respiratory: positive for cough or dyspnea on exertion, crackles bilaterally. Cardiac: No CP. /60. Regular rhythm. GI: Bowel sounds active. Last BM 8/6/20 x2    : Void status is voiding well without difficulty  Skin: Skin color, texture, turgor normal. No rashes or lesions. Toes appear pink but are intact otherwise. Extremities: +1 pitting edema BLE. Sensation intact. Cap refill <3 sec.      Vitals:  Patient Vitals for the past 12 hrs:   Temp Pulse Resp BP SpO2   08/07/20 1116 -- -- -- -- 99 %   08/07/20 0849 97.8 °F (36.6 °C) 86 24 147/60 98 %   08/07/20 0717 -- -- -- -- 98 %   08/07/20 0700 -- 90 -- 128/67 --   08/07/20 0458 -- -- -- -- 96 %   08/07/20 0343 97.9 °F (36.6 °C) 79 25 134/65 96 %   08/07/20 0054 -- -- -- -- 97 %   08/07/20 0008 98.2 °F (36.8 °C) 75 25 135/60 97 %        Pertinent labs:  Recent Results (from the past 12 hour(s))   CBC W/O DIFF    Collection Time: 08/07/20  5:00 AM   Result Value Ref Range    WBC 11.6 4.6 - 13.2 K/uL    RBC 3.48 (L) 4.70 - 5.50 M/uL    HGB 10.1 (L) 13.0 - 16.0 g/dL    HCT 30.5 (L) 36.0 - 48.0 %    MCV 87.6 74.0 - 97.0 FL    MCH 29.0 24.0 - 34.0 PG    MCHC 33.1 31.0 - 37.0 g/dL    RDW 13.3 11.6 - 14.5 %    PLATELET 145 127 - 251 K/uL    MPV 8.9 (L) 9.2 - 25.1 FL   METABOLIC PANEL, BASIC    Collection Time: 08/07/20  5:00 AM   Result Value Ref Range    Sodium 134 (L) 136 - 145 mmol/L    Potassium 3.0 (L) 3.5 - 5.5 mmol/L    Chloride 94 (L) 100 - 111 mmol/L    CO2 28 21 - 32 mmol/L    Anion gap 12 3.0 - 18 mmol/L    Glucose 156 (H) 74 - 99 mg/dL    BUN 41 (H) 7.0 - 18 MG/DL    Creatinine 1.49 (H) 0.6 - 1.3 MG/DL    BUN/Creatinine ratio 28 (H) 12 - 20      GFR est AA 56 (L) >60 ml/min/1.73m2    GFR est non-AA 46 (L) >60 ml/min/1.73m2    Calcium 8.1 (L) 8.5 - 10.1 MG/DL   GLUCOSE, POC    Collection Time: 08/07/20  6:12 AM   Result Value Ref Range    Glucose (POC) 187 (H) 70 - 110 mg/dL           Current Facility-Administered Medications:     potassium chloride (K-DUR, KLOR-CON) SR tablet 40 mEq, 40 mEq, Oral, BID, Harshal Mireles MD    arformoteroL [facility]) neb solution 15 mcg, 15 mcg, Nebulization, BID RT, Harshal Mireles MD, 15 mcg at 08/07/20 0717    albuterol-ipratropium (DUO-NEB) 2.5 MG-0.5 MG/3 ML, 3 mL, Nebulization, Q4H RT, Harshal Mireles MD, 3 mL at 08/07/20 1114    guaiFENesin ER (MUCINEX) tablet 600 mg, 600 mg, Oral, Q12H, Harshal Mireles MD, 600 mg at 08/07/20 0949    methylPREDNISolone (PF) (SOLU-MEDROL) injection 40 mg, 40 mg, IntraVENous, Q12H, Nina Mejia MD, 40 mg at 08/07/20 0950    albuterol CONCENTRATE 2.5mg/0.5 mL neb soln, 2.5 mg, Nebulization, Q4H PRN, Nina Mejia MD, 2.5 mg at 08/07/20 0458    sodium chloride (NS) flush 5-40 mL, 5-40 mL, IntraVENous, Q8H, Babita Zapata MD, 10 mL at 08/07/20 0520    sodium chloride (NS) flush 5-40 mL, 5-40 mL, IntraVENous, PRN, Berhane Zapata MD, 10 mL at 08/06/20 0043    budesonide (PULMICORT) 500 mcg/2 ml nebulizer suspension, 500 mcg, Nebulization, BID RT, Babita Zapata MD, 500 mcg at 08/07/20 6687    acetaminophen (TYLENOL) tablet 650 mg, 650 mg, Oral, Q4H PRN, Babita Zapata MD    oxyCODONE IR (ROXICODONE) tablet 5 mg, 5 mg, Oral, Q4H PRN, Babita Zapata MD    morphine injection 2 mg, 2 mg, IntraVENous, Q4H PRN, Berhane Zapata MD    naloxone (NARCAN) injection 0.4 mg, 0.4 mg, IntraVENous, PRN, Berhane Zapata MD    ondansetron (ZOFRAN) injection 4 mg, 4 mg, IntraVENous, Q4H PRN, Berhane Zapata MD    bisacodyL (DULCOLAX) tablet 5 mg, 5 mg, Oral, DAILY PRN, Berhane Zapata MD    LORazepam (ATIVAN) injection 1 mg, 1 mg, IntraVENous, Q6H PRN, Berhane Zapata MD    heparin (porcine) injection 5,000 Units, 5,000 Units, SubCUTAneous, Q8H, Berhane Zapata MD, 5,000 Units at 08/07/20 0518    chlorthalidone (HYGROTEN) tablet 25 mg, 25 mg, Oral, DAILY, Berhane Zapata MD, 25 mg at 08/07/20 5847    carboxymethylcellulose sodium (REFRESH PLUS) 0.5 % ophthalmic solution 2 Drop, 2 Drop, Both Eyes, PRN, Berhane Zapata MD    dilTIAZem IR (CARDIZEM) tablet 30 mg, 30 mg, Oral, TIDAC, Berhane Zapata MD, 30 mg at 08/07/20 0700    tamsulosin (FLOMAX) capsule 0.4 mg, 0.4 mg, Oral, QPM, Berhane Zapata MD, 0.4 mg at 08/06/20 1738    cefTRIAXone (ROCEPHIN) 1 g in sterile water (preservative free) 10 mL IV syringe, 1 g, IntraVENous, Q24H, Berhane Zapata MD, 1 g at 08/07/20 0115    azithromycin (ZITHROMAX) 500 mg in 0.9% sodium chloride 250 mL (VIAL-MATE), 500 mg, IntraVENous, Q24H, Berhane Zapata MD, 500 mg at 08/07/20 0115          Rosalino Campos, MSN, RN, [facility]    Clinical Nurse Specialist  [address] [place] [address], [address] [place] [address]  Office: [phone]   [address]: [phone]

## 2020-08-07 NOTE — PROGRESS NOTES
0715:Received verbal bedside report from off going nurse JACKIE aCrey Patient care received. Patient alert and oriented x 4. Patient resting in bed denies pain. Patient stable. Call light with in reach bed in lowest position.

## 2020-08-07 NOTE — ROUTINE PROCESS
Bedside and Verbal shift change report given to B. Valrie Kawasaki, RN (oncoming nurse) by CHANTAL Sears RN (offgoing nurse). Report included the following information SBAR, Kardex, Intake/Output, MAR and Recent Results.

## 2020-08-07 NOTE — PROGRESS NOTES
[facility]  PCP Dr. Womack Can RN called back. It appears that possibly when pt followed up w/ PCP post discharge last admission he told the RN there that he takes the cardizem once a day and that is how it was entered into their system and that is why the PCP sent RX to [facility] for immediate release cardizem 30mg once daily. RN could not find anything in the office notes regarding a clinical reason why it was prescribed that way. She also stated the the PCP said that we can switch it and to just let them know if we do. Chris Walter RPh, BS Pharm.   Clinical Pharmacist  [phone]

## 2020-08-07 NOTE — PROGRESS NOTES
DC Plan: Discharge home with [facility], MD follow up, family assistance     Chart reviewed. Pt admitted to tele by hospitalist for COPD. Pt lives with wife and son. Pt has RW, home oxygen thru [facility]. PCP confirmed as MD [place]. Pt refuses rehab stating he does not want to go to [facility]. Therapy recommending HH. FOC offered for [place]. Pt chose [facility], referral placed.   CM will cont to follow for transition of care needs [phone] or via hospital  on weekends

## 2020-08-07 NOTE — PROGRESS NOTES
Hospitalist Progress Note-critical care note     Patient: Fer Wade MRN: [id number]  CSN: [id number]    YOB: 1945  Age: 66 y.o. Sex: male    DOA: 8/4/2020 LOS:  LOS: 2 days            Chief complaint: copd exacerbation ,cap     Assessment/Plan         Hospital Problems  Date Reviewed: 6/13/2020          Codes Class Noted POA    COPD (chronic obstructive pulmonary disease) ([facility] ICD-10-CM: J44.9  ICD-9-CM: 215  8/5/2020 Unknown        CAP (community acquired pneumonia) ICD-10-CM: J18.9  ICD-9-CM: 486  8/5/2020 Unknown        Hypokalemia ICD-10-CM: E87.6  ICD-9-CM: 276.8  4/14/2020 Yes        * (Principal) Acute exacerbation of chronic obstructive pulmonary disease (COPD) ([facility] ICD-10-CM: J44.1  ICD-9-CM: 491.21  2/8/2019 Yes              Acute COPD exacerbation  Mild improving, still wheezing,   cxr is no edema   Continue IV steroid and breathing tx        Cap ? Continue iv abx    Breathing tx as needed      Asbestosis-irreversable disease ,chronic O2 user at home   Dnr, still want to be intubated for 2 weeks if needed      Hyponatremia   Continue monitoring      PAF   Continue monitor   Optimize electrolytes     Hypokalemia   K replacement     Subjective: better today     rn : stable     K 3.0 will replace 80 mEq, IT WILL BE DANGEROUS to put on pt for daily K supplement at this clinic point. He is not on lasix daily  With cr 1.49 . Disposition :tbd,   Review of systems:    General: No fevers or chills. Cardiovascular: No chest pain or pressure. No palpitations. Pulmonary: shortness of breath better. +wheezing   Gastrointestinal: No nausea, vomiting.      Vital signs/Intake and Output:  Visit Vitals  /74   Pulse 81   Temp 97.5 °F (36.4 °C)   Resp 24   Ht 5' 8\" (1.727 m)   Wt 98.8 kg (217 lb 12.8 oz)   SpO2 97%   BMI 33.12 kg/m²     Current Shift:  08/07 0701 - 08/07 1900  In: 240 [P.O.:240]  Out: 375 [Urine:375]  Last three shifts:  08/05 1901 - 08/07 0700  In: 840 [P.O.:840]  Out: 1925 [Urine:1925]    Physical Exam:  General: WD, WN. Alert, cooperative, no acute distress    HEENT: NC, Atraumatic. PERRLA, anicteric sclerae. Lungs: Wheezing. No rales   Heart:  Regular  rhythm,  No murmur, No Rubs, No Gallops  Abdomen: Soft, Non distended, Non tender. +Bowel sounds,   Extremities: No c/c/e  Psych:   Not anxious or agitated. Neurologic:  No acute neurological deficit. Labs: Results:       Chemistry Recent Labs     08/07/20  0500 08/06/20  0305 08/05/20  0430 08/04/20  2154   * 149* 169* 136*   * 131* 129* 130*   K 3.0* 3.6 3.8 3.2*   CL 94* 96* 94* 94*   CO2 28 25 28 27   BUN 41* 33* 24* 28*   CREA 1.49* 1.19 1.29 1.51*   CA 8.1* 8.2* 8.5 8.3*   AGAP 12 10 7 9   BUCR 28* 28* 19 19   AP  --   --   --  98   TP  --   --   --  6.5   ALB  --   --   --  3.3*   GLOB  --   --   --  3.2   AGRAT  --   --   --  1.0      CBC w/Diff Recent Labs     08/07/20  0500 08/06/20  0305 08/05/20  0430 08/04/20  2154   WBC 11.6 15.1* 15.7* 17.4*   RBC 3.48* 3.45* 3.80* 3.76*   HGB 10.1* 9.8* 10.8* 10.7*   HCT 30.5* 30.5* 34.0* 33.2*    303 324 319   GRANS  --   --   --  81*   LYMPH  --   --   --  3*   EOS  --   --   --  8*      Cardiac Enzymes Recent Labs     08/04/20  2154      CKND1 3.2      Coagulation No results for input(s): PTP, INR, APTT, INREXT, INREXT in the last 72 hours. Lipid Panel No results found for: CHOL, CHOLPOCT, CHOLX, CHLST, CHOLV, 932027, HDL, HDLP, LDL, LDLC, DLDLP, 567450, VLDLC, VLDL, TGLX, TRIGL, TRIGP, TGLPOCT, CHHD, CHHDX   BNP No results for input(s): BNPP in the last 72 hours.    Liver Enzymes Recent Labs     08/04/20  2154   TP 6.5   ALB 3.3*   AP 98      Thyroid Studies Lab Results   Component Value Date/Time    TSH 0.86 10/20/2019 01:05 AM        Procedures/imaging: see electronic medical records for all procedures/Xrays and details which were not copied into this note but were reviewed prior to creation of Plan    Xr Chest [facility] Date: 8/6/2020  EXAM: XR CHEST PORT CLINICAL INDICATION/HISTORY: sob -Additional: None COMPARISON: 8/4/2020 TECHNIQUE: Portable frontal view of the chest _______________ FINDINGS: SUPPORT DEVICES: None. HEART AND MEDIASTINUM: Cardiomediastinal silhouette within normal limits. LUNGS AND PLEURAL SPACES: Hypoinflated lungs. Pleural plaques. Bibasilar atelectasis. No large effusion or pneumothorax. _______________     IMPRESSION: No acute cardiopulmonary abnormality. Hypoinflated lungs. Pleural plaques. Bibasilar atelectasis. Xr Chest Port    Result Date: 8/4/2020  EXAM:  AP Portable Chest X-ray 1 view INDICATION: Shortness of breath COMPARISON: June 17, 2020 and CT chest dated October 31, 2019 _______________ FINDINGS:  Heart and mediastinal contours are within normal limits for portable radiograph. Lungs are clear of active disease. Parenchymal scarring seen at the right lung base. There are emphysematous changes. There are no pleural effusions. Extensive pleural calcifications are seen on the right similar to prior exam. No acute osseous findings. ________________      IMPRESSION:  No acute process. Chronic pleural-parenchymal scarring and calcification of the pleura in the right.       Lauren Gutierrez MD

## 2020-08-07 NOTE — PROGRESS NOTES
[facility]                  Pulmonary, Critical Care, and Sleep Medicine     Name: Balaji Oliveros MRN: [id number]   : 1945 Hospital: [facility]    Date: 2020        [facility] Note                                              Consult requesting physician: Dr. Judah Wiley  Reason for Consult: COPD exacerbation. IMPRESSION:   Acute exacerbation of chronic obstructive pulmonary disease (COPD) ([facility]) J44.1     Acute respiratory failure with hypoxia and hypercarbia ([facility]) J96.01, J96.02     Tobacco dependence F17.200 ·     ·   Patient Active Problem List   Diagnosis Code    Hypertension I10    COPD (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 ([facility]) J44.9    Chronic renal disease, stage 3, moderately decreased glomerular filtration rate between 30-59 mL/min/1.73 square meter ([facility]) N18.3    Asbestosis ([address] J61    Acute exacerbation of chronic obstructive pulmonary disease (COPD) ([facility]) J44.1    Debility R53.81    Paroxysmal atrial fibrillation ([facility]) I48.0    Chronic respiratory failure with hypoxia ([facility]) J96.11    Tobacco dependence F17.200    Hyponatremia E87.1    Pleural effusion, right J90    COPD with acute exacerbation ([facility]) J44.1    Acute respiratory failure with hypoxia and hypercarbia ([facility]) J96.01, J96.02    Hyponatremia E87.1    Advanced care planning/counseling discussion Z71.89    Hypokalemia E87.6    COPD (chronic obstructive pulmonary disease) ([facility]) J44.9    CAP (community acquired pneumonia) J18.9                 RECOMMENDATIONS:   Worsening dyspnea on admission, likely multifactorial including COPD exacerbation and diastolic CHF exacerbation. Radiologic chest findings: No acute infiltrate. Chronic changes present. Oxygen: 1.5 LPM NC, keep SPO2 >91%. Bronchodilators: Continue DuoNeb every 4 hour, Brovana twice daily, Pulmicort twice daily for now. Once he improves, change DuoNeb to Atrovent 4 times daily.   Start albuterol as needed. Steroids: solumedrol reduce dose to 40 mg iv bid. Diuretics: Chronic diastolic CHF and leg edema. Use Lasix intermittently. Monitor creatinine closely with history of CKD. Airway clearance: Continue incentive spirometer. Jerelyn Viky as needed. Culture data: Blood culture: NGTD. Antibiotics: Continue Rocephin and Zithromax for COPD exacerbation/bronchitis. Mobilizing, PT, OT, incentive spirometry. FiO2 to keep SpO2 >=92%, HOB >=30 degree, aspiration precautions, aggressive pulmonary toileting, incentive spirometry, PT/OT eval and treat, mobilization. DVT Prophylaxis: Heparin  GI Prophylaxis: Low risk for stress ulcer  Other issues management by primary team and respective consultants. Further recommendations will be based on the patient's response to recommended treatment and results of the investigation ordered. Quality Care: PPI, DVT prophylaxis, HOB elevated, infection control all reviewed and addressed. Discussed with patient, radiologic work up showed, answered all questions to their satisfaction. Care plan discussed with nursing. Subjective/History:     Valentina Mirza is a 66 y.o. 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 No recent travel or exposure to [address] patient. No suspicion for COVID19 infection. 8/7/2020   After diuresing with Lasix 40 mg IV x1 yesterday, dyspnea improved. On 1.5 LPM NC. Leukocytosis resolved. Mild cough without sputum production. Intermittent wheezing. Hemodynamic stable. Persistent leg edema.       Review of Systems:   HEENT: No epistaxis, no nasal drainage, no difficulty in swallowing, no redness in eyes  Respiratory: as above  Cardiovascular: no chest pain, no palpitations, +chronic leg edema, no syncope  Gastrointestinal: no abd pain, no vomiting, no diarrhea, no bleeding symptoms  Genitourinary: No urinary symptoms or hematuria  Integument/breast: No ulcers or rashes  Musculoskeletal:Neg  Neurological: No focal weakness, no seizures, no headaches  Behvioral/Psych: No anxiety, no depression  Constitutional: No fever, no chills, no weight loss, no night sweats       Intake/Output Summary (Last 24 hours) at 8/7/2020 1120  Last data filed at 8/7/2020 3844  Gross per 24 hour   Intake 480 ml   Output 1525 ml   Net -1045 ml         Immunization status:   Immunization History   Administered Date(s) Administered    Influenza High Dose Vaccine PF 09/10/2012, 11/04/2013, 10/26/2015, 08/30/2016    Influenza Vaccine 01/15/2017    Influenza Vaccine (Quad) PF 10/23/2018, 10/22/2019    Influenza Vaccine PF 10/06/2014    Pneumococcal Conjugate (PCV-13) 07/01/2015, 06/12/2018    Pneumococcal Polysaccharide (PPSV-23) 03/19/2010, 11/10/2011    Pneumococcal Vaccine (Unspecified Type) 10/01/2012, 10/01/2012          Allergies   Allergen Reactions    Aspirin Other (comments)     \"messes my stomach up\"        Past Medical History:   Diagnosis Date    Brain aneurysm     Cancer [address]     prostate    COPD [facility]chronic obstructive pulmonary disease) [address]     Hypertension     Nocturia     Pneumonia     Radiation effect         Past Surgical History:   Procedure Laterality Date    HX HERNIA REPAIR          Family History   Problem Relation Age of Onset    Heart Disease Mother         Social History     Tobacco Use    Smoking status: Former Smoker     Types: Cigarettes    Smokeless tobacco: Never Used   Substance Use Topics    Alcohol use: No        Prior to Admission medications    Medication Sig Start Date End Date Taking? Authorizing Provider   predniSONE (STERAPRED DS) 10 mg dose pack See administration instruction per 10mg dose pack 6/17/20   Berhane Zapata MD   albuterol (ACCUNEB) 1.25 mg/3 mL nebu Take 3 mL by inhalation every four (4) hours as needed for Wheezing (wheezing). 5/13/20   Breana Andrade DO   dilTIAZem (CARDIZEM) 30 mg tablet Take 1 Tab by mouth Before breakfast, lunch, and dinner. 4/14/20   Jessica Carlos MD   chlorthalidone (HYGROTEN) 25 mg tablet Take 25 mg by mouth daily. Other, MD Blayne   acetaminophen (TYLENOL) 325 mg tablet Take 500 mg by mouth every four (4) hours as needed for Pain. Provider, Historical   OXYGEN-AIR DELIVERY SYSTEMS Take 2 L by inhalation continuous. Provider, Historical   dextran 70/hypromellose (ARTIFICIAL TEARS, PF, OP) Apply 2 Drops to eye as needed for Other (both eyes for dryness). Provider, Historical   diphenhydrAMINE (BENADRYL) 25 mg capsule Take 25 mg by mouth nightly as needed for Itching. Provider, Historical   albuterol (PROVENTIL HFA, VENTOLIN HFA, PROAIR HFA) 90 mcg/actuation inhaler Take 2 Puffs by inhalation every four (4) hours as needed for Wheezing or Shortness of Breath. 4/23/18   Lilo Ramirez PA-C   ipratropium (ATROVENT) 0.03 % nasal spray 2 Sprays by Both Nostrils route every twelve (12) hours as needed for Rhinitis. Shant, MD Blayne   tamsulosin (FLOMAX) 0.4 mg capsule Take 0.4 mg by mouth every evening.     Shant, MD Blayne       Current Facility-Administered Medications   Medication Dose Route Frequency    arformoteroL (BROVANA) neb solution 15 mcg  15 mcg Nebulization BID RT    albuterol-ipratropium (DUO-NEB) 2.5 MG-0.5 MG/3 ML  3 mL Nebulization Q4H RT    guaiFENesin ER (MUCINEX) tablet 600 mg  600 mg Oral Q12H    methylPREDNISolone (PF) (SOLU-MEDROL) injection 40 mg  40 mg IntraVENous Q12H    albuterol CONCENTRATE 2.5mg/0.5 mL neb soln  2.5 mg Nebulization Q4H PRN    sodium chloride (NS) flush 5-40 mL  5-40 mL IntraVENous Q8H    sodium chloride (NS) flush 5-40 mL  5-40 mL IntraVENous PRN    budesonide (PULMICORT) 500 mcg/2 ml nebulizer suspension  500 mcg Nebulization BID RT    acetaminophen (TYLENOL) tablet 650 mg  650 mg Oral Q4H PRN    oxyCODONE IR (ROXICODONE) tablet 5 mg  5 mg Oral Q4H PRN    morphine injection 2 mg  2 mg IntraVENous Q4H PRN    naloxone (NARCAN) injection 0.4 mg  0.4 mg IntraVENous PRN    ondansetron (ZOFRAN) injection 4 mg  4 mg IntraVENous Q4H PRN    bisacodyL (DULCOLAX) tablet 5 mg  5 mg Oral DAILY PRN    LORazepam (ATIVAN) injection 1 mg  1 mg IntraVENous Q6H PRN    heparin (porcine) injection 5,000 Units  5,000 Units SubCUTAneous Q8H    chlorthalidone (HYGROTEN) tablet 25 mg  25 mg Oral DAILY    carboxymethylcellulose sodium (REFRESH PLUS) 0.5 % ophthalmic solution 2 Drop  2 Drop Both Eyes PRN    dilTIAZem IR (CARDIZEM) tablet 30 mg  30 mg Oral TIDAC    tamsulosin (FLOMAX) capsule 0.4 mg  0.4 mg Oral QPM    cefTRIAXone (ROCEPHIN) 1 g in sterile water (preservative free) 10 mL IV syringe  1 g IntraVENous Q24H    azithromycin (ZITHROMAX) 500 mg in 0.9% sodium chloride 250 mL (VIAL-MATE)  500 mg IntraVENous Q24H         Objective:   Vital Signs:    Visit Vitals  /60 (BP 1 Location: Left arm, BP Patient Position: At rest)   Pulse 86   Temp 97.8 °F (36.6 °C)   Resp 24   Ht 5' 8\" (1.727 m)   Wt 98.8 kg (217 lb 12.8 oz)   SpO2 98%   BMI 33.12 kg/m²       O2 Device: Nasal cannula   O2 Flow Rate (L/min): 1.5 l/min   Temp (24hrs), Av °F (36.7 °C), Min:97.7 °F (36.5 °C), Max:98.4 °F (36.9 °C)       Intake/Output:   Last shift:      No intake/output data recorded. Last 3 shifts:  1901 -  0700  In: 840 [P.O.:840]  Out:  [Urine:]    Intake/Output Summary (Last 24 hours) at 2020 0902  Last data filed at 2020 2210  Gross per 24 hour   Intake 240 ml   Output 1150 ml   Net -910 ml       Physical Exam:     General/Neurology: Alert, Awake, NAD. Head:   Normocephalic, without obvious abnormality, atraumatic. Eye:   EOM intact, no scleral icterus, no pallor, no cyanosis. Lung: Moderate air entry bilateral equal. No stridors. No prolongded expiration. No accessory muscle use. Mild expiratory wheezing.   Bilateral scattered crackles at the bases. Heart:   Regular rate & rhythm. S1 S2 present. No murmur. No JVD. Abdomen:  Soft. NT. ND. +BS. No masses. Extremities:  Trace to 1+ bilateral leg pitting edema up to the knee. No cyanosis. No clubbing. Pulses: 2+ and symmetric in DP. Capillary refill: normal.   Lymphatic:  No cervical or supraclavicular palpable lymphadenopathy. Skin:   Color, texture, turgor normal. No rashes or lesions. Data:       Recent Results (from the past 24 hour(s))   GLUCOSE, POC    Collection Time: 08/06/20 11:38 AM   Result Value Ref Range    Glucose (POC) 150 (H) 70 - 110 mg/dL   POC G3    Collection Time: 08/06/20  2:16 PM   Result Value Ref Range    Device: NASAL CANNULA      Flow rate (POC) 1.5 L/M    FIO2 (POC) 26 %    pH (POC) 7.43 7.35 - 7.45      pCO2 (POC) 36.9 35.0 - 45.0 MMHG    pO2 (POC) 114 (H) 80 - 100 MMHG    HCO3 (POC) 24.6 22 - 26 MMOL/L    sO2 (POC) 99 (H) 92 - 97 %    Base excess (POC) 0 mmol/L    Allens test (POC) YES      Total resp.  rate 30      Site RIGHT RADIAL      Specimen type (POC) ARTERIAL      Performed by Qian 34, POC    Collection Time: 08/06/20  4:07 PM   Result Value Ref Range    Glucose (POC) 164 (H) 70 - 110 mg/dL   CBC W/O DIFF    Collection Time: 08/07/20  5:00 AM   Result Value Ref Range    WBC 11.6 4.6 - 13.2 K/uL    RBC 3.48 (L) 4.70 - 5.50 M/uL    HGB 10.1 (L) 13.0 - 16.0 g/dL    HCT 30.5 (L) 36.0 - 48.0 %    MCV 87.6 74.0 - 97.0 FL    MCH 29.0 24.0 - 34.0 PG    MCHC 33.1 31.0 - 37.0 g/dL    RDW 13.3 11.6 - 14.5 %    PLATELET 370 068 - 326 K/uL    MPV 8.9 (L) 9.2 - 51.4 FL   METABOLIC PANEL, BASIC    Collection Time: 08/07/20  5:00 AM   Result Value Ref Range    Sodium 134 (L) 136 - 145 mmol/L    Potassium 3.0 (L) 3.5 - 5.5 mmol/L    Chloride 94 (L) 100 - 111 mmol/L    CO2 28 21 - 32 mmol/L    Anion gap 12 3.0 - 18 mmol/L    Glucose 156 (H) 74 - 99 mg/dL    BUN 41 (H) 7.0 - 18 MG/DL    Creatinine 1.49 (H) 0.6 - 1.3 MG/DL    BUN/Creatinine ratio 28 (H) 12 - 20      GFR est AA 56 (L) >60 ml/min/1.73m2    GFR est non-AA 46 (L) >60 ml/min/1.73m2    Calcium 8.1 (L) 8.5 - 10.1 MG/DL   GLUCOSE, POC    Collection Time: 08/07/20  6:12 AM   Result Value Ref Range    Glucose (POC) 187 (H) 70 - 110 mg/dL         Chemistry Recent Labs     08/07/20  0500 08/06/20  0305 08/05/20  0430 08/04/20  2154   * 149* 169* 136*   * 131* 129* 130*   K 3.0* 3.6 3.8 3.2*   CL 94* 96* 94* 94*   CO2 28 25 28 27   BUN 41* 33* 24* 28*   CREA 1.49* 1.19 1.29 1.51*   CA 8.1* 8.2* 8.5 8.3*   AGAP 12 10 7 9   BUCR 28* 28* 19 19   AP  --   --   --  98   TP  --   --   --  6.5   ALB  --   --   --  3.3*   GLOB  --   --   --  3.2   AGRAT  --   --   --  1.0        Lactic Acid Lactic acid   Date Value Ref Range Status   08/04/2020 1.3 0.4 - 2.0 MMOL/L Final     Recent Labs     08/04/20 2154   LAC 1.3        Liver Enzymes Protein, total   Date Value Ref Range Status   08/04/2020 6.5 6.4 - 8.2 g/dL Final     Albumin   Date Value Ref Range Status   08/04/2020 3.3 (L) 3.4 - 5.0 g/dL Final     Globulin   Date Value Ref Range Status   08/04/2020 3.2 2.0 - 4.0 g/dL Final     A-G Ratio   Date Value Ref Range Status   08/04/2020 1.0 0.8 - 1.7   Final     Alk.  phosphatase   Date Value Ref Range Status   08/04/2020 98 45 - 117 U/L Final     Recent Labs     08/04/20  2154   TP 6.5   ALB 3.3*   GLOB 3.2   AGRAT 1.0   AP 98        CBC w/Diff Recent Labs     08/07/20  0500 08/06/20  0305 08/05/20  0430 08/04/20  2154   WBC 11.6 15.1* 15.7* 17.4*   RBC 3.48* 3.45* 3.80* 3.76*   HGB 10.1* 9.8* 10.8* 10.7*   HCT 30.5* 30.5* 34.0* 33.2*    303 324 319   GRANS  --   --   --  81*   LYMPH  --   --   --  3*   EOS  --   --   --  8*        Cardiac Enzymes No results found for: CPK, CK, CKMMB, CKMB, RCK3, CKMBT, CKNDX, CKND1, WILLARD, TROPT, TROIQ, DENNYS, TROPT, TNIPOC, BNP, BNPP     BNP No results found for: BNP, BNPP, XBNPT     Coagulation No results for input(s): PTP, INR, APTT, INREXT, INREXT in the last 72 hours.      Thyroid  Lab Results   Component Value Date/Time    TSH 0.86 10/20/2019 01:05 AM       No results found for: T4     Urinalysis Lab Results   Component Value Date/Time    Color YELLOW 08/05/2020 03:40 AM    Appearance CLEAR 08/05/2020 03:40 AM    Specific gravity 1.015 08/05/2020 03:40 AM    pH (UA) 5.5 08/05/2020 03:40 AM    Protein Negative 08/05/2020 03:40 AM    Glucose Negative 08/05/2020 03:40 AM    Ketone Negative 08/05/2020 03:40 AM    Bilirubin Negative 08/05/2020 03:40 AM    Urobilinogen 1.0 08/05/2020 03:40 AM    Nitrites Negative 08/05/2020 03:40 AM    Leukocyte Esterase Negative 08/05/2020 03:40 AM    Bacteria FEW (A) 09/08/2018 12:30 PM    WBC 0 to 3 09/08/2018 12:30 PM    RBC 0 to 3 09/08/2018 12:30 PM        Micro  Recent Labs     08/04/20  2215 08/04/20  2154   CULT NO GROWTH 2 DAYS NO GROWTH 3 DAYS     Recent Labs     08/04/20  2215 08/04/20  2154   CULT NO GROWTH 2 DAYS NO GROWTH 3 DAYS        ABG Recent Labs     08/06/20  1416   PHI 7.43   PCO2I 36.9   PO2I 114*   HCO3I 24.6   FIO2I 26          Echo 6/15/20:  Result status: Final result    · Image quality for this study was technically difficult. Contrast used: DEFINITY. · Normal cavity size, wall thickness and systolic function (ejection fraction normal). Estimated left ventricular ejection fraction is 60 - 65%. Mild (grade 1) left ventricular diastolic dysfunction E/E' ratio is 11. 16.  · Mildly dilated left atrium. · Mildly dilated right ventricle. · Mildly dilated right atrium. · Probably trileaflet aortic valve. · Mitral valve non-specific thickening. Mild mitral annular calcification. Trace mitral valve regurgitation is present. · Pulmonary arterial systolic pressure is 25 mmHg. Pulmonary hypertension not suggested by Doppler findings. · Severely elevated central venous pressure (15+ mmHg); IVC diameter is larger than 21 mm and collapses less than 50% with respiration.               CT (Most Recent) (CT chest reviewed by me) Results from [facility] encounter on 05/29/20   CT LOW DOSE LUNG CANCER SCREENING    Narrative EXAM: CT Chest, lung cancer screening    CLINICAL INDICATION/HISTORY: Lung cancer screening, cigarette/nicotine  dependence. COMPARISON: CTA of the chest 10/31/2019. TECHNIQUE: Chest CT from thoracic inlet through the diaphragm without contrast.  A low-dose lung cancer screening protocol was performed. Note that visualization  of non-pulmonary soft tissue structures is limited with this protocol. Coronal  and sagittal reformats were generated and reviewed. One or more dose reduction  techniques were used on this CT: automated exposure control, adjustment of the  mAs and/or kVp according to patient size, and iterative reconstruction  techniques. The specific techniques used on this CT exam have been documented  in the patient's electronic medical record. Digital Imaging and Communications  in Medicine (DICOM) format image data are available to nonaffiliated external  healthcare facilities or entities on a secure, media free, reciprocally  searchable basis with patient authorization for at least a 12-month period after  this study. DLP: 67    _______________    FINDINGS:    LUNGS:    Nodules: No suspicious nodule. Other pulmonary findings:  Focally calcified area of soft tissue thickening along the right pleural margin  both anteriorly and posteriorly is unchanged in appearance since prior  examination. OTHER:     Coronary artery ossifications are present.     _______________        Impression IMPRESSION:    No suspicious pulmonary nodule. Focal areas of peripheral pleural thickening  with peripheral calcification is unchanged in appearance since prior  examinations. Lung-RADS 1 - Negative. Management: Continue annual screening with low dose CT in 12 months. Lung cancer screening categorization and recommendations per the [facility] of radiology Lung-RADS version 1.1.               XR (Most Recent). CXR  reviewed by me and compared with previous CXR Results from Hospital Encounter encounter on 08/04/20   XR CHEST PORT    Narrative EXAM: XR CHEST PORT    CLINICAL INDICATION/HISTORY: sob  -Additional: None    COMPARISON: 8/4/2020    TECHNIQUE: Portable frontal view of the chest    _______________    FINDINGS:    SUPPORT DEVICES: None. HEART AND MEDIASTINUM: Cardiomediastinal silhouette within normal limits. LUNGS AND PLEURAL SPACES: Hypoinflated lungs. Pleural plaques. Bibasilar  atelectasis. No large effusion or pneumothorax.    _______________      Impression IMPRESSION:    No acute cardiopulmonary abnormality. Hypoinflated lungs. Pleural plaques. Bibasilar atelectasis.           Juanis Gordon MD  8/7/2020

## 2020-08-07 NOTE — PROGRESS NOTES
Problem: Falls - Risk of  Goal: *Absence of Falls  Description: Document Paulino Ragsdale Fall Risk and appropriate interventions in the flowsheet.   Outcome: Progressing Towards Goal  Note: Fall Risk Interventions:  Mobility Interventions: Communicate number of staff needed for ambulation/transfer, Patient to call before getting OOB         Medication Interventions: Teach patient to arise slowly, Patient to call before getting OOB, Assess postural VS orthostatic hypotension

## 2020-08-07 NOTE — PROGRESS NOTES
ADMISSION MEDICATION RECONCILIATION      Medication Reconciliation has been performed by pharmacist on this patient admitted through the ED several days ago at the request of Purnima Dela Cruz Nurse Specialist.    Liat Coburn is a 66 y.o. male with the following Problems:  Assessment This Admission:   Chief complaint:   Chief Complaint   Patient presents with    Shortness of Breath    COPD      Principal Problem:    Acute exacerbation of chronic obstructive pulmonary disease (COPD) ([facility] [address] (2/8/2019)    Active Problems:    Hypokalemia (4/14/2020)      COPD (chronic obstructive pulmonary disease) ([address] (8/5/2020)      CAP (community acquired pneumonia) (8/5/2020)         Allergies as of 08/04/2020 - Review Complete 08/04/2020   Allergen Reaction Noted    Aspirin Other (comments) 10/02/2014        Prior to Admission Medications   Prescriptions Last Dose Informant Patient Reported? Taking?   acetaminophen (TYLENOL) 325 mg tablet Unknown at Unknown time  Yes No   Sig: Take 650 mg by mouth every eight (8) hours as needed for Pain. albuterol (PROVENTIL HFA, VENTOLIN HFA, PROAIR HFA) 90 mcg/actuation inhaler Unknown at Unknown time  No No   Sig: Take 2 Puffs by inhalation every four (4) hours as needed for Wheezing or Shortness of Breath. chlorthalidone (HYGROTEN) 25 mg tablet Unknown at Unknown time  Yes No   Sig: Take 25 mg by mouth daily. dextran 70/hypromellose (ARTIFICIAL TEARS, PF, OP) Unknown at Unknown time  Yes No   Sig: Apply 2 Drops to eye as needed for Other (both eyes for dryness). dilTIAZem (CARDIZEM) 30 mg tablet Unknown at Unknown time  No No   Sig: Take 1 Tab by mouth Before breakfast, lunch, and dinner. Patient taking differently: Take 30 mg by mouth daily. diphenhydrAMINE (BENADRYL) 25 mg capsule Unknown at Unknown time  Yes No   Sig: Take 25 mg by mouth nightly as needed for Itching.    fluticasone propionate (Flonase Allergy Relief) 50 mcg/actuation nasal spray 7/31/2020 at Unknown time Yes Yes   Si Sprays by Both Nostrils route daily as needed. furosemide (Lasix) 20 mg tablet   Yes Yes   Sig: Take 20 mg by mouth daily. tamsulosin (FLOMAX) 0.4 mg capsule Unknown at Unknown time  Yes No   Sig: Take 0.4 mg by mouth every evening. tiotropium-olodateroL (Stiolto Respimat) 2.5-2.5 mcg/actuation inhaler   Yes Yes   Sig: Take 2 Puffs by inhalation daily. Facility-Administered Medications: None          Interviewed patient Via telephone so as to maintain social distancing during the COVID-19 outbreak. This person was a Poor historian. Did patient/representative provide a written list of home medications? no  Medications are managed by: self  Patient's outpatient pharmacy is [facility] was not  marked complete and did require modification. Medication Compliance Issues and/or Medication Concerns:  [facility] and PCP office (Dr. Vu Porras [phone]) report immediate release cardizem once daily. [facility] also reports  one fill for 30 days supply of cardizem 30mg TID in April from Dr. Vane Morris (our hospitalist) and then in May cardizem 30mg once daily #90 from Dr. Vu Porras. Spoke w/ Dr. Alo Aguilar RN who will double check with Dr. Vu Porras and call me back if it was intentional that pt take immediate release cardizem just once a day. Added to PAML: flonase, stiolto    Removed from [facility] ED: albuterol nebs, duo-neb, monodox and prednisone (both were from )    Modified dose/freq of PAML entry: none    Will notify hospitalist once hear back from PCP.     Chris Walter [facility], Yin Borja    Clinical Pharmacist  [phone]

## 2020-08-07 NOTE — PROGRESS NOTES
Problem: Mobility Impaired (Adult and Pediatric)  Goal: *Acute Goals and Plan of Care (Insert Text)  Description: Physical Therapy Goals   Initiated 8/5/2020 and to be accomplished within 7 day(s)  1. Patient will move from supine <> sit with S in prep for out of bed activity and change of position. 2.  Patient will perform sit<> stand with S/RW in prep for transfers/ambulation. 3.  Patient will ambulate 100 feet with S/RW for improved functional mobility at discharge. 5.  Patient will ascend/descend 3-5 stairs with handrail(s) with CGA/S for home re-entry as needed. Outcome: Resolved/Met     PHYSICAL THERAPY TREATMENT/DISCHARGE    Patient: Kristen Diaz (37 y.o. male)  Date: 8/7/2020  Diagnosis: COPD (chronic obstructive pulmonary disease) ([facility] [address] [J44.9]   Acute exacerb[facility] of [facility] disease ([facility]       Precautions: Fall  Chart, physical therapy assessment, plan of care and goals were reviewed. ASSESSMENT:  Pt meets needs for safe home mobility and is cleared from this level of PT. Progression toward goals:  [x]      Goals met  []      Improving appropriately and progressing toward goals  []      Improving slowly and progressing toward goals  []      Not making progress toward goals and plan of care will be adjusted     PLAN:  Patient will be discharged from physical therapy at this time. Rationale for discharge:  [x] Goals Achieved  [] [address]  [] Patient not participating in therapy  [] Other:  Discharge Recommendations:  Home Health  Further Equipment Recommendations for Discharge:  bedside commode and rolling walker     SUBJECTIVE:   Patient stated Ok.     OBJECTIVE DATA SUMMARY:   Critical Behavior:  Neurologic State: Alert, Appropriate for age  Orientation Level: Appropriate for age, Oriented X4  Cognition: Appropriate decision making, Appropriate for age attention/concentration, Appropriate safety awareness  Safety/Judgement: Awareness of environment, Fall prevention  Functional Mobility Training:  Bed Mobility:  Rolling: Independent  Supine to Sit: Independent  Sit to Supine: Independent  Scooting: Independent  Transfers:  Sit to Stand: Supervision  Stand to Sit: Supervision  Balance:  Sitting: Intact  Standing: Intact; With support  Ambulation/Gait Training:  Distance (ft): 140 Feet (ft)  Assistive Device: Gait belt;Walker, rolling  Ambulation - Level of Assistance: Supervision  Gait Abnormalities: Decreased step clearance  Step Length: Right shortened;Left shortened  Stairs:  Number of Stairs Trained: 5  Stairs - Level of Assistance: Supervision   Rail Use: Both(RW)  Pain:  Pain in: 0  Pain out: 0  Activity Tolerance:   Good  Please refer to the flowsheet for vital signs taken during this treatment.   After treatment:   [] Patient left in no apparent distress sitting up in chair  [x] Patient left in no apparent distress in bed  [x] Call bell left within reach  [x] Nursing notified  [] Caregiver present  [] Bed alarm activated  Melissa Alejo PTA   Time Calculation: 34 mins

## 2020-08-07 NOTE — PROGRESS NOTES
Problem: Falls - Risk of  Goal: *Absence of Falls  Description: Document Carlos Donaldson Fall Risk and appropriate interventions in the flowsheet. Outcome: Progressing Towards Goal  Note: Fall Risk Interventions:  Mobility Interventions: Communicate number of staff needed for ambulation/transfer, Patient to call before getting OOB         Medication Interventions: Teach patient to arise slowly, Patient to call before getting OOB, Assess postural VS orthostatic hypotension                   Problem: Patient Education: Go to Patient Education Activity  Goal: Patient/Family Education  Outcome: Progressing Towards Goal     Problem: Pressure Injury - Risk of  Goal: *Prevention of pressure injury  Description: Document Nikos Scale and appropriate interventions in the flowsheet.   Outcome: Progressing Towards Goal  Note: Pressure Injury Interventions:  Sensory Interventions: Assess changes in LOC, Pressure redistribution bed/mattress (bed type)    Moisture Interventions: Absorbent underpads    Activity Interventions: Increase time out of bed, Pressure redistribution bed/mattress(bed type)    Mobility Interventions: Pressure redistribution bed/mattress (bed type)    Nutrition Interventions: Document food/fluid/supplement intake    Friction and Shear Interventions: Feet elevated on foot rest                Problem: Patient Education: Go to Patient Education Activity  Goal: Patient/Family Education  Outcome: Progressing Towards Goal

## 2020-08-08 LAB
ANION GAP SERPL CALC-SCNC: 7 MMOL/L (ref 3–18)
BUN SERPL-MCNC: 42 MG/DL (ref 7–18)
BUN/CREAT SERPL: 32 (ref 12–20)
CALCIUM SERPL-MCNC: 8 MG/DL (ref 8.5–10.1)
CHLORIDE SERPL-SCNC: 99 MMOL/L (ref 100–111)
CO2 SERPL-SCNC: 28 MMOL/L (ref 21–32)
CREAT SERPL-MCNC: 1.33 MG/DL (ref 0.6–1.3)
ERYTHROCYTE [DISTWIDTH] IN BLOOD BY AUTOMATED COUNT: 13.3 % (ref 11.6–14.5)
GLUCOSE BLD STRIP.AUTO-MCNC: 129 MG/DL (ref 70–110)
GLUCOSE BLD STRIP.AUTO-MCNC: 152 MG/DL (ref 70–110)
GLUCOSE BLD STRIP.AUTO-MCNC: 167 MG/DL (ref 70–110)
GLUCOSE BLD STRIP.AUTO-MCNC: 229 MG/DL (ref 70–110)
GLUCOSE SERPL-MCNC: 154 MG/DL (ref 74–99)
HCT VFR BLD AUTO: 29 % (ref 36–48)
HGB BLD-MCNC: 9.6 G/DL (ref 13–16)
MCH RBC QN AUTO: 28.9 PG (ref 24–34)
MCHC RBC AUTO-ENTMCNC: 33.1 G/DL (ref 31–37)
MCV RBC AUTO: 87.3 FL (ref 74–97)
PLATELET # BLD AUTO: 324 K/UL (ref 135–420)
PMV BLD AUTO: 8.8 FL (ref 9.2–11.8)
POTASSIUM SERPL-SCNC: 3.7 MMOL/L (ref 3.5–5.5)
RBC # BLD AUTO: 3.32 M/UL (ref 4.7–5.5)
SODIUM SERPL-SCNC: 134 MMOL/L (ref 136–145)
WBC # BLD AUTO: 8.3 K/UL (ref 4.6–13.2)

## 2020-08-08 PROCEDURE — 74011250636 HC RX REV CODE- 250/636: Performed by: FAMILY MEDICINE

## 2020-08-08 PROCEDURE — 65660000000 HC RM CCU STEPDOWN

## 2020-08-08 PROCEDURE — 74011250636 HC RX REV CODE- 250/636: Performed by: INTERNAL MEDICINE

## 2020-08-08 PROCEDURE — 94760 N-INVAS EAR/PLS OXIMETRY 1: CPT

## 2020-08-08 PROCEDURE — 36415 COLL VENOUS BLD VENIPUNCTURE: CPT

## 2020-08-08 PROCEDURE — 74011000250 HC RX REV CODE- 250: Performed by: HOSPITALIST

## 2020-08-08 PROCEDURE — 77010033678 HC OXYGEN DAILY

## 2020-08-08 PROCEDURE — 94640 AIRWAY INHALATION TREATMENT: CPT

## 2020-08-08 PROCEDURE — 74011250637 HC RX REV CODE- 250/637: Performed by: FAMILY MEDICINE

## 2020-08-08 PROCEDURE — 74011000250 HC RX REV CODE- 250: Performed by: FAMILY MEDICINE

## 2020-08-08 PROCEDURE — 80048 BASIC METABOLIC PNL TOTAL CA: CPT

## 2020-08-08 PROCEDURE — 85027 COMPLETE CBC AUTOMATED: CPT

## 2020-08-08 PROCEDURE — 74011250637 HC RX REV CODE- 250/637: Performed by: HOSPITALIST

## 2020-08-08 PROCEDURE — 82962 GLUCOSE BLOOD TEST: CPT

## 2020-08-08 PROCEDURE — 74011636637 HC RX REV CODE- 636/637: Performed by: FAMILY MEDICINE

## 2020-08-08 RX ORDER — FUROSEMIDE 10 MG/ML
20 INJECTION INTRAMUSCULAR; INTRAVENOUS ONCE
Status: COMPLETED | OUTPATIENT
Start: 2020-08-08 | End: 2020-08-08

## 2020-08-08 RX ADMIN — METHYLPREDNISOLONE SODIUM SUCCINATE 40 MG: 40 INJECTION, POWDER, FOR SOLUTION INTRAMUSCULAR; INTRAVENOUS at 21:02

## 2020-08-08 RX ADMIN — CHLORTHALIDONE 25 MG: 25 TABLET ORAL at 08:23

## 2020-08-08 RX ADMIN — INSULIN LISPRO 2 UNITS: 100 INJECTION, SOLUTION INTRAVENOUS; SUBCUTANEOUS at 06:48

## 2020-08-08 RX ADMIN — Medication 10 ML: at 08:23

## 2020-08-08 RX ADMIN — GUAIFENESIN 600 MG: 600 TABLET, EXTENDED RELEASE ORAL at 21:03

## 2020-08-08 RX ADMIN — Medication 10 ML: at 22:00

## 2020-08-08 RX ADMIN — HEPARIN SODIUM 5000 UNITS: 5000 INJECTION INTRAVENOUS; SUBCUTANEOUS at 21:02

## 2020-08-08 RX ADMIN — CEFTRIAXONE 1 G: 1 INJECTION, POWDER, FOR SOLUTION INTRAMUSCULAR; INTRAVENOUS at 01:16

## 2020-08-08 RX ADMIN — BUDESONIDE 500 MCG: 0.5 INHALANT RESPIRATORY (INHALATION) at 20:25

## 2020-08-08 RX ADMIN — DILTIAZEM HYDROCHLORIDE 30 MG: 30 TABLET, FILM COATED ORAL at 06:48

## 2020-08-08 RX ADMIN — INSULIN LISPRO 2 UNITS: 100 INJECTION, SOLUTION INTRAVENOUS; SUBCUTANEOUS at 12:46

## 2020-08-08 RX ADMIN — DILTIAZEM HYDROCHLORIDE 30 MG: 30 TABLET, FILM COATED ORAL at 11:30

## 2020-08-08 RX ADMIN — HEPARIN SODIUM 5000 UNITS: 5000 INJECTION INTRAVENOUS; SUBCUTANEOUS at 04:34

## 2020-08-08 RX ADMIN — HEPARIN SODIUM 5000 UNITS: 5000 INJECTION INTRAVENOUS; SUBCUTANEOUS at 12:41

## 2020-08-08 RX ADMIN — GUAIFENESIN 600 MG: 600 TABLET, EXTENDED RELEASE ORAL at 08:23

## 2020-08-08 RX ADMIN — Medication 10 ML: at 13:25

## 2020-08-08 RX ADMIN — ARFORMOTEROL TARTRATE 15 MCG: 15 SOLUTION RESPIRATORY (INHALATION) at 07:11

## 2020-08-08 RX ADMIN — BUDESONIDE 500 MCG: 0.5 INHALANT RESPIRATORY (INHALATION) at 07:11

## 2020-08-08 RX ADMIN — IPRATROPIUM BROMIDE AND ALBUTEROL SULFATE 3 ML: .5; 3 SOLUTION RESPIRATORY (INHALATION) at 20:25

## 2020-08-08 RX ADMIN — FUROSEMIDE 20 MG: 20 INJECTION, SOLUTION INTRAMUSCULAR; INTRAVENOUS at 12:41

## 2020-08-08 RX ADMIN — ARFORMOTEROL TARTRATE 15 MCG: 15 SOLUTION RESPIRATORY (INHALATION) at 20:27

## 2020-08-08 RX ADMIN — TAMSULOSIN HYDROCHLORIDE 0.4 MG: 0.4 CAPSULE ORAL at 17:45

## 2020-08-08 RX ADMIN — IPRATROPIUM BROMIDE AND ALBUTEROL SULFATE 3 ML: .5; 3 SOLUTION RESPIRATORY (INHALATION) at 15:29

## 2020-08-08 RX ADMIN — IPRATROPIUM BROMIDE AND ALBUTEROL SULFATE 3 ML: .5; 3 SOLUTION RESPIRATORY (INHALATION) at 04:21

## 2020-08-08 RX ADMIN — INSULIN LISPRO 4 UNITS: 100 INJECTION, SOLUTION INTRAVENOUS; SUBCUTANEOUS at 17:46

## 2020-08-08 RX ADMIN — DILTIAZEM HYDROCHLORIDE 30 MG: 30 TABLET, FILM COATED ORAL at 17:45

## 2020-08-08 RX ADMIN — METHYLPREDNISOLONE SODIUM SUCCINATE 40 MG: 40 INJECTION, POWDER, FOR SOLUTION INTRAMUSCULAR; INTRAVENOUS at 08:23

## 2020-08-08 RX ADMIN — IPRATROPIUM BROMIDE AND ALBUTEROL SULFATE 3 ML: .5; 3 SOLUTION RESPIRATORY (INHALATION) at 07:11

## 2020-08-08 RX ADMIN — AZITHROMYCIN 500 MG: 500 INJECTION, POWDER, LYOPHILIZED, FOR SOLUTION INTRAVENOUS at 01:16

## 2020-08-08 RX ADMIN — IPRATROPIUM BROMIDE AND ALBUTEROL SULFATE 3 ML: .5; 3 SOLUTION RESPIRATORY (INHALATION) at 12:32

## 2020-08-08 NOTE — PROGRESS NOTES
[facility]                  Pulmonary, Critical Care, and Sleep Medicine     Name: Josie Hawthorne MRN: [id number]   : 1945 Hospital: [facility]    Date: 2020        [facility] Note                                              Consult requesting physician: Dr. Hector Germain  Reason for Consult: COPD exacerbation. IMPRESSION:   Acute exacerbation of chronic obstructive pulmonary disease (COPD) ([facility]) J44.1     Acute respiratory failure with hypoxia and hypercarbia ([facility]) J96.01, J96.02     Tobacco dependence F17.200 ·     ·   Patient Active Problem List   Diagnosis Code    Hypertension I10    COPD (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 ([facility]) J44.9    Chronic renal disease, stage 3, moderately decreased glomerular filtration rate between 30-59 mL/min/1.73 square meter ([facility]) N18.3    Asbestosis ([address] J61    Acute exacerbation of chronic obstructive pulmonary disease (COPD) ([facility]) J44.1    Debility R53.81    Paroxysmal atrial fibrillation ([facility]) I48.0    Chronic respiratory failure with hypoxia ([facility]) J96.11    Tobacco dependence F17.200    Hyponatremia E87.1    Pleural effusion, right J90    COPD with acute exacerbation ([facility]) J44.1    Acute respiratory failure with hypoxia and hypercarbia ([facility]) J96.01, J96.02    Hyponatremia E87.1    Advanced care planning/counseling discussion Z71.89    Hypokalemia E87.6    COPD (chronic obstructive pulmonary disease) ([facility]) J44.9    CAP (community acquired pneumonia) J18.9                 RECOMMENDATIONS:   Worsening dyspnea on admission, likely multifactorial including COPD exacerbation and diastolic CHF exacerbation. Improving dyspnea, wheezing. Radiologic chest findings: No acute infiltrate. Chronic changes present. Oxygen: 1 LPM NC, keep SPO2 >91%. Bronchodilators: Continue DuoNeb every 4 hour, Brovana twice daily, Pulmicort twice daily for now.   Once he improves, change DuoNeb to Atrovent 4 times daily. Start albuterol as needed. Steroids: solumedrol reduce dose to 40 mg iv bid. Diuretics: Chronic diastolic CHF and leg edema. Dyspnea improved with Lasix, will give another Lasix 20 mg IV x1 now. Persistent leg edema. Monitor creatinine closely with history of CKD. Airway clearance: Continue incentive spirometer. Zach Titi as needed. Culture data: Blood culture: NGTD. Antibiotics: Continue Rocephin and Zithromax for COPD exacerbation/bronchitis. Mobilizing, PT, OT, incentive spirometry. FiO2 to keep SpO2 >=92%, HOB >=30 degree, aspiration precautions, aggressive pulmonary toileting, incentive spirometry, PT/OT eval and treat, mobilization. DVT Prophylaxis: Heparin  GI Prophylaxis: Low risk for stress ulcer  Other issues management by primary team and respective consultants. Further recommendations will be based on the patient's response to recommended treatment and results of the investigation ordered. Quality Care: PPI, DVT prophylaxis, HOB elevated, infection control all reviewed and addressed. Discussed with patient, radiologic work up showed, answered all questions to their satisfaction. Care plan discussed with nursing. Subjective/History:     Smith Pineda is a 66 y.o. 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 No recent travel or exposure to [employer] [address] patient. No suspicion for COVID19 infection. 8/8/2020   After intermittent diuresing, dyspnea improving. Persistent leg edema. On 1 LPM NC. No fever, leukocytosis. Mild dry cough without sputum production. Complains of intermittent wheezing. Patient feels overall dyspnea is improving.       Review of Systems:   HEENT: No epistaxis, no nasal drainage, no difficulty in swallowing, no redness in eyes  Respiratory: as above  Cardiovascular: no chest pain, no palpitations, +chronic leg edema, no syncope  Gastrointestinal: no abd pain, no vomiting, no diarrhea, no bleeding symptoms  Genitourinary: No urinary symptoms or hematuria  Integument/breast: No ulcers or rashes  Musculoskeletal:Neg  Neurological: No focal weakness, no seizures, no headaches  Behvioral/Psych: No anxiety, no depression  Constitutional: No fever, no chills, no weight loss, no night sweats       Intake/Output Summary (Last 24 hours) at 8/8/2020 1043  Last data filed at 8/8/2020 1770  Gross per 24 hour   Intake 1100 ml   Output 1925 ml   Net -825 ml         Immunization status:   Immunization History   Administered Date(s) Administered    Influenza High Dose Vaccine PF 09/10/2012, 11/04/2013, 10/26/2015, 08/30/2016    Influenza Vaccine 01/15/2017    Influenza Vaccine (Quad) PF 10/23/2018, 10/22/2019    Influenza Vaccine PF 10/06/2014    Pneumococcal Conjugate (PCV-13) 07/01/2015, 06/12/2018    Pneumococcal Polysaccharide (PPSV-23) 03/19/2010, 11/10/2011    Pneumococcal Vaccine (Unspecified Type) 10/01/2012, 10/01/2012          Allergies   Allergen Reactions    Aspirin Other (comments)     \"messes my stomach up\"        Past Medical History:   Diagnosis Date    Brain aneurysm     [address]     prostate    COPD [facility]chronic obstructive pulmonary disease) [address]     Hypertension     Nocturia     Pneumonia     Radiation effect         Past Surgical History:   Procedure Laterality Date    HX HERNIA REPAIR          Family History   Problem Relation Age of Onset    Heart Disease Mother         Social History     Tobacco Use    Smoking status: Former Smoker     Types: Cigarettes    Smokeless tobacco: Never Used   Substance Use Topics    Alcohol use: No        Prior to Admission medications    Medication Sig Start Date End Date Taking? Authorizing Provider   furosemide (Lasix) 20 mg tablet Take 20 mg by mouth daily.    Yes Provider, Historical   fluticasone propionate (Flonase Allergy Relief) 50 mcg/actuation nasal spray 2 Sprays by Both Nostrils route daily as needed. Yes Provider, Historical   tiotropium-olodateroL (Stiolto Respimat) 2.5-2.5 mcg/actuation inhaler Take 2 Puffs by inhalation daily. Yes Provider, Historical   dilTIAZem (CARDIZEM) 30 mg tablet Take 1 Tab by mouth Before breakfast, lunch, and dinner. Patient taking differently: Take 30 mg by mouth daily. 4/14/20   Aurora Calderon MD   chlorthalidone (HYGROTEN) 25 mg tablet Take 25 mg by mouth daily. Blayne Bran MD   acetaminophen (TYLENOL) 325 mg tablet Take 650 mg by mouth every eight (8) hours as needed for Pain. Provider, Historical   dextran 70/hypromellose (ARTIFICIAL TEARS, PF, OP) Apply 2 Drops to eye as needed for Other (both eyes for dryness). Provider, Historical   diphenhydrAMINE (BENADRYL) 25 mg capsule Take 25 mg by mouth nightly as needed for Itching. Provider, Historical   albuterol (PROVENTIL HFA, VENTOLIN HFA, PROAIR HFA) 90 mcg/actuation inhaler Take 2 Puffs by inhalation every four (4) hours as needed for Wheezing or Shortness of Breath. 4/23/18   Prashant Zaidi PA-C   tamsulosin (FLOMAX) 0.4 mg capsule Take 0.4 mg by mouth every evening.     Other, MD Blayne       Current Facility-Administered Medications   Medication Dose Route Frequency    insulin lispro (HUMALOG) injection   SubCUTAneous AC&HS    arformoteroL (BROVANA) neb solution 15 mcg  15 mcg Nebulization BID RT    albuterol-ipratropium (DUO-NEB) 2.5 MG-0.5 MG/3 ML  3 mL Nebulization Q4H RT    guaiFENesin ER (MUCINEX) tablet 600 mg  600 mg Oral Q12H    methylPREDNISolone (PF) (SOLU-MEDROL) injection 40 mg  40 mg IntraVENous Q12H    albuterol CONCENTRATE 2.5mg/0.5 mL neb soln  2.5 mg Nebulization Q4H PRN    sodium chloride (NS) flush 5-40 mL  5-40 mL IntraVENous Q8H    sodium chloride (NS) flush 5-40 mL  5-40 mL IntraVENous PRN    budesonide (PULMICORT) 500 mcg/2 ml nebulizer suspension  500 mcg Nebulization BID RT    acetaminophen (TYLENOL) tablet 650 mg  650 mg Oral Q4H PRN    oxyCODONE IR (ROXICODONE) tablet 5 mg  5 mg Oral Q4H PRN    morphine injection 2 mg  2 mg IntraVENous Q4H PRN    naloxone (NARCAN) injection 0.4 mg  0.4 mg IntraVENous PRN    ondansetron (ZOFRAN) injection 4 mg  4 mg IntraVENous Q4H PRN    bisacodyL (DULCOLAX) tablet 5 mg  5 mg Oral DAILY PRN    LORazepam (ATIVAN) injection 1 mg  1 mg IntraVENous Q6H PRN    heparin (porcine) injection 5,000 Units  5,000 Units SubCUTAneous Q8H    chlorthalidone (HYGROTEN) tablet 25 mg  25 mg Oral DAILY    carboxymethylcellulose sodium (REFRESH PLUS) 0.5 % ophthalmic solution 2 Drop  2 Drop Both Eyes PRN    dilTIAZem IR (CARDIZEM) tablet 30 mg  30 mg Oral TIDAC    tamsulosin (FLOMAX) capsule 0.4 mg  0.4 mg Oral QPM    cefTRIAXone (ROCEPHIN) 1 g in sterile water (preservative free) 10 mL IV syringe  1 g IntraVENous Q24H    azithromycin (ZITHROMAX) 500 mg in 0.9% sodium chloride 250 mL (VIAL-MATE)  500 mg IntraVENous Q24H         Objective:   Vital Signs:    Visit Vitals  /68   Pulse 83   Temp 97.9 °F (36.6 °C)   Resp 18   Ht 5' 8\" (1.727 m)   Wt 98.8 kg (217 lb 14.4 oz)   SpO2 98%   BMI 33.13 kg/m²       O2 Device: Nasal cannula   O2 Flow Rate (L/min): 1 l/min   Temp (24hrs), Av.9 °F (36.6 °C), Min:97.5 °F (36.4 °C), Max:98.3 °F (36.8 °C)       Intake/Output:   Last shift:      No intake/output data recorded. Last 3 shifts:  1901 -  0700  In: 1340 [P.O.:1080; I.V.:260]  Out: 3000 [Urine:3000]    Intake/Output Summary (Last 24 hours) at 2020 1043  Last data filed at 2020 0648  Gross per 24 hour   Intake 1100 ml   Output 1925 ml   Net -825 ml       Physical Exam:     General/Neurology: Alert, Awake, NAD. Head:   Normocephalic, without obvious abnormality, atraumatic. Eye:   EOM intact, no scleral icterus, no pallor, no cyanosis. Lung: Moderate air entry bilateral equal. No stridors. No prolongded expiration. No accessory muscle use.   Mild expiratory wheezing. Bilateral scattered crackles at the bases. Heart:   Regular rate & rhythm. S1 S2 present. No murmur. No JVD. Abdomen:  Soft. NT. ND. +BS. No masses. Extremities:  Trace to 1+ bilateral leg pitting edema up to the knee. No cyanosis. No clubbing. Pulses: 2+ and symmetric in DP. Capillary refill: normal.   Lymphatic:  No cervical or supraclavicular palpable lymphadenopathy. Skin:   Color, texture, turgor normal. No rashes or lesions.        Data:       Recent Results (from the past 24 hour(s))   GLUCOSE, POC    Collection Time: 08/07/20 12:00 PM   Result Value Ref Range    Glucose (POC) 156 (H) 70 - 110 mg/dL   GLUCOSE, POC    Collection Time: 08/07/20  4:37 PM   Result Value Ref Range    Glucose (POC) 153 (H) 70 - 110 mg/dL   GLUCOSE, POC    Collection Time: 08/07/20  9:50 PM   Result Value Ref Range    Glucose (POC) 169 (H) 70 - 110 mg/dL   CBC W/O DIFF    Collection Time: 08/08/20  5:00 AM   Result Value Ref Range    WBC 8.3 4.6 - 13.2 K/uL    RBC 3.32 (L) 4.70 - 5.50 M/uL    HGB 9.6 (L) 13.0 - 16.0 g/dL    HCT 29.0 (L) 36.0 - 48.0 %    MCV 87.3 74.0 - 97.0 FL    MCH 28.9 24.0 - 34.0 PG    MCHC 33.1 31.0 - 37.0 g/dL    RDW 13.3 11.6 - 14.5 %    PLATELET 830 007 - 221 K/uL    MPV 8.8 (L) 9.2 - 11.0 FL   METABOLIC PANEL, BASIC    Collection Time: 08/08/20  5:00 AM   Result Value Ref Range    Sodium 134 (L) 136 - 145 mmol/L    Potassium 3.7 3.5 - 5.5 mmol/L    Chloride 99 (L) 100 - 111 mmol/L    CO2 28 21 - 32 mmol/L    Anion gap 7 3.0 - 18 mmol/L    Glucose 154 (H) 74 - 99 mg/dL    BUN 42 (H) 7.0 - 18 MG/DL    Creatinine 1.33 (H) 0.6 - 1.3 MG/DL    BUN/Creatinine ratio 32 (H) 12 - 20      GFR est AA >60 >60 ml/min/1.73m2    GFR est non-AA 52 (L) >60 ml/min/1.73m2    Calcium 8.0 (L) 8.5 - 10.1 MG/DL   GLUCOSE, POC    Collection Time: 08/08/20  6:31 AM   Result Value Ref Range    Glucose (POC) 167 (H) 70 - 110 mg/dL         Chemistry Recent Labs     08/08/20  0500 08/07/20  0500 08/06/20  0305   GLU 154* 156* 149*   * 134* 131*   K 3.7 3.0* 3.6   CL 99* 94* 96*   CO2 28 28 25   BUN 42* 41* 33*   CREA 1.33* 1.49* 1.19   CA 8.0* 8.1* 8.2*   AGAP 7 12 10   BUCR 32* 28* 28*        Lactic Acid Lactic acid   Date Value Ref Range Status   08/04/2020 1.3 0.4 - 2.0 MMOL/L Final     No results for input(s): LAC in the last 72 hours. Liver Enzymes Protein, total   Date Value Ref Range Status   08/04/2020 6.5 6.4 - 8.2 g/dL Final     Albumin   Date Value Ref Range Status   08/04/2020 3.3 (L) 3.4 - 5.0 g/dL Final     Globulin   Date Value Ref Range Status   08/04/2020 3.2 2.0 - 4.0 g/dL Final     A-G Ratio   Date Value Ref Range Status   08/04/2020 1.0 0.8 - 1.7   Final     Alk. phosphatase   Date Value Ref Range Status   08/04/2020 98 45 - 117 U/L Final     No results for input(s): TP, ALB, GLOB, AGRAT, AP, TBIL in the last 72 hours. No lab exists for component: SGOT, GPT, DBIL     CBC w/Diff Recent Labs     08/08/20  0500 08/07/20  0500 08/06/20  0305   WBC 8.3 11.6 15.1*   RBC 3.32* 3.48* 3.45*   HGB 9.6* 10.1* 9.8*   HCT 29.0* 30.5* 30.5*    343 303        Cardiac Enzymes No results found for: CPK, CK, CKMMB, CKMB, RCK3, CKMBT, CKNDX, CKND1, WILLARD, TROPT, TROIQ, DENNYS, TROPT, TNIPOC, BNP, BNPP     BNP No results found for: BNP, BNPP, XBNPT     Coagulation No results for input(s): PTP, INR, APTT, INREXT, INREXT in the last 72 hours.       Thyroid  Lab Results   Component Value Date/Time    TSH 0.86 10/20/2019 01:05 AM       No results found for: T4     Urinalysis Lab Results   Component Value Date/Time    Color YELLOW 08/05/2020 03:40 AM    Appearance CLEAR 08/05/2020 03:40 AM    Specific gravity 1.015 08/05/2020 03:40 AM    pH (UA) 5.5 08/05/2020 03:40 AM    Protein Negative 08/05/2020 03:40 AM    Glucose Negative 08/05/2020 03:40 AM    Ketone Negative 08/05/2020 03:40 AM    Bilirubin Negative 08/05/2020 03:40 AM    Urobilinogen 1.0 08/05/2020 03:40 AM    Nitrites Negative 08/05/2020 03:40 AM Leukocyte Esterase Negative 08/05/2020 03:40 AM    Bacteria FEW (A) 09/08/2018 12:30 PM    WBC 0 to 3 09/08/2018 12:30 PM    RBC 0 to 3 09/08/2018 12:30 PM        Micro  No results for input(s): SDES, CULT in the last 72 hours. No results for input(s): CULT in the last 72 hours. ABG Recent Labs     08/06/20  1416   PHI 7.43   PCO2I 36.9   PO2I 114*   HCO3I 24.6   FIO2I 26          Echo 6/15/20:  Result status: Final result    · Image quality for this study was technically difficult. Contrast used: DEFINITY. · Normal cavity size, wall thickness and systolic function (ejection fraction normal). Estimated left ventricular ejection fraction is 60 - 65%. Mild (grade 1) left ventricular diastolic dysfunction E/E' ratio is 11. 16.  · Mildly dilated left atrium. · Mildly dilated right ventricle. · Mildly dilated right atrium. · Probably trileaflet aortic valve. · Mitral valve non-specific thickening. Mild mitral annular calcification. Trace mitral valve regurgitation is present. · Pulmonary arterial systolic pressure is 25 mmHg. Pulmonary hypertension not suggested by Doppler findings. · Severely elevated central venous pressure (15+ mmHg); IVC diameter is larger than 21 mm and collapses less than 50% with respiration. CT (Most Recent) (CT chest reviewed by me) Results from Hospital Encounter encounter on 05/29/20   CT LOW DOSE LUNG CANCER SCREENING    Narrative EXAM: CT Chest, lung cancer screening    CLINICAL INDICATION/HISTORY: Lung cancer screening, cigarette/nicotine  dependence. COMPARISON: CTA of the chest 10/31/2019. TECHNIQUE: Chest CT from thoracic inlet through the diaphragm without contrast.  A low-dose lung cancer screening protocol was performed. Note that visualization  of non-pulmonary soft tissue structures is limited with this protocol. Coronal  and sagittal reformats were generated and reviewed.  One or more dose reduction  techniques were used on this CT: automated exposure control, adjustment of the  mAs and/or kVp according to patient size, and iterative reconstruction  techniques. The specific techniques used on this CT exam have been documented  in the patient's electronic medical record. Digital Imaging and Communications  in Medicine (DICOM) format image data are available to nonaffiliated external  healthcare facilities or entities on a secure, media free, reciprocally  searchable basis with patient authorization for at least a 12-month period after  this study. DLP: 67    _______________    FINDINGS:    LUNGS:    Nodules: No suspicious nodule. Other pulmonary findings:  Focally calcified area of soft tissue thickening along the right pleural margin  both anteriorly and posteriorly is unchanged in appearance since prior  examination. OTHER:     Coronary artery ossifications are present.     _______________        Impression IMPRESSION:    No suspicious pulmonary nodule. Focal areas of peripheral pleural thickening  with peripheral calcification is unchanged in appearance since prior  examinations. Lung-RADS 1 - Negative. Management: Continue annual screening with low dose CT in 12 months. Lung cancer screening categorization and recommendations per the [employer] of radiology Lung-RADS version 1.1. XR (Most Recent). CXR  reviewed by me and compared with previous CXR Results from Hospital Encounter encounter on 08/04/20   XR CHEST PORT    Narrative EXAM: XR CHEST PORT    CLINICAL INDICATION/HISTORY: sob  -Additional: None    COMPARISON: 8/4/2020    TECHNIQUE: Portable frontal view of the chest    _______________    FINDINGS:    SUPPORT DEVICES: None. HEART AND MEDIASTINUM: Cardiomediastinal silhouette within normal limits. LUNGS AND PLEURAL SPACES: Hypoinflated lungs. Pleural plaques. Bibasilar  atelectasis.  No large effusion or pneumothorax.    _______________      Impression IMPRESSION:    No acute cardiopulmonary abnormality. Hypoinflated lungs. Pleural plaques. Bibasilar atelectasis.           Juanis Carrington MD  8/8/2020

## 2020-08-08 NOTE — ROUTINE PROCESS
Bedside shift change report given to Carlos Mullen RN (oncoming nurse) by Catalino Adams RN (offgoing nurse). Report included the following information SBAR, Kardex, Intake/Output, MAR and Cardiac Rhythm SR.      Visit Vitals  BP (P) 132/60   Pulse (P) 78   Temp (P) 98.1 °F (36.7 °C)   Resp (P) 20   Ht 5' 8\" (1.727 m)   Wt 98.8 kg (217 lb 14.4 oz)   SpO2 98%   BMI 33.13 kg/m²       Catalino Adams RN

## 2020-08-08 NOTE — ROUTINE PROCESS
1600: received bedside shift report from Skippy Saint, RN (offgoing nurse) and assumed care of the pt. Updated white board, assessed all current needs, left bed in lowest position with wheels locked and call light within reach. 1900: Shift summary, shift uneventful, no complaints of chest pain or shortness of breath.      Lucretia Fregoso RN

## 2020-08-08 NOTE — PROGRESS NOTES
Hospitalist Progress Note    Patient: Lotus Singh MRN: [id number]  CSN: [id number]    YOB: 1945  Age: 66 y.o. Sex: male    DOA: 8/4/2020 LOS:  LOS: 3 days            Assessment/Plan     Principal Problem:    Acute exacerbation of chronic obstructive pulmonary disease (COPD) ([facility] (2/8/2019)    Active Problems:    Hypokalemia (4/14/2020)      COPD (chronic obstructive pulmonary disease) ([facility] (8/5/2020)      CAP (community acquired pneumonia) (8/5/2020)      Acute COPD exacerbation: improving, continue Rocephin and azithromycin. cxr is no edema   Continue IV steroid and breathing tx      CAP: Continue iv abx    Breathing tx as needed      Asbestosis-irreversable disease ,chronic O2 user at home   Dnr, still want to be intubated for 2 weeks if needed      Hyponatremia : Continue monitoring      PAF : Continue monitor. Rate controlled with Cardizem  Optimize electrolytes      Hypokalemia : K replacement      Disposition : Home with home health in 1 to 2 days    CC: COPD exacerbation, CAP, hyponatremia, PAF           Subjective:     Pt was seen and examined with the nurse in the morning round. Feeling better. Positive cough, less shortness of breath    Review of systems  General: No fevers or chills. Cardiovascular: No chest pain or pressure. No palpitations. Pulmonary: + cough, SOB  Gastrointestinal: No nausea, vomiting. Objective:      Visit Vitals  BP (P) 132/60   Pulse (P) 78   Temp (P) 98.1 °F (36.7 °C)   Resp (P) 20   Ht 5' 8\" (1.727 m)   Wt 98.8 kg (217 lb 14.4 oz)   SpO2 98%   BMI 33.13 kg/m²       Physical Exam:    Gen: NAD, non-toxic. Heent:  MMM, NC, AT. Cor: s1s2 RRR. No MRG. PMI mid 5th intercostal space. Resp: Coarse breathing sound bilateral  Abd:  NT ND.  BS positive. No rebound or guarding. No masses. Ext: No edema or cyanosis.       Intake and Output:  Current Shift:  08/08 0701 - 08/08 1900  In: -   Out: 0366 [[id number]]  Last three shifts:  08/06 1901 - 08/08 0700  In: 1340 [P.O.:1080; I.V.:260]  Out: 3000 [Urine:3000]    Labs: Results:       Chemistry Recent Labs     08/08/20  0500 08/07/20  0500 08/06/20  0305   * 156* 149*   * 134* 131*   K 3.7 3.0* 3.6   CL 99* 94* 96*   CO2 28 28 25   BUN 42* 41* 33*   CREA 1.33* 1.49* 1.19   CA 8.0* 8.1* 8.2*   AGAP 7 12 10   BUCR 32* 28* 28*      CBC w/Diff Recent Labs     08/08/20  0500 08/07/20  0500 08/06/20  0305   WBC 8.3 11.6 15.1*   RBC 3.32* 3.48* 3.45*   HGB 9.6* 10.1* 9.8*   HCT 29.0* 30.5* 30.5*    343 303      Cardiac Enzymes No results for input(s): CPK, CKND1, WILLARD in the last 72 hours. No lab exists for component: CKRMB, TROIP   Coagulation No results for input(s): PTP, INR, APTT, INREXT in the last 72 hours. Lipid Panel No results found for: CHOL, CHOLPOCT, CHOLX, CHLST, CHOLV, [id number], HDL, HDLP, LDL, LDLC, DLDLP, [id number], VLDLC, VLDL, TGLX, TRIGL, TRIGP, TGLPOCT, CHHD, CHHDX   BNP No results for input(s): BNPP in the last 72 hours. Liver Enzymes No results for input(s): TP, ALB, TBIL, AP in the last 72 hours.     No lab exists for component: SGOT, GPT, DBIL   Thyroid Studies Lab Results   Component Value Date/Time    TSH 0.86 10/20/2019 01:05 AM        Procedures/imaging: see electronic medical records for all procedures/Xrays and details which were not copied into this note but were reviewed prior to creation of Plan      Medications Reviewed  Jamarcus Rodriguez MD

## 2020-08-08 NOTE — ROUTINE PROCESS
9514 assumed care of pt after bedside verbal report was given by off going nurse, pt resting in bed awake, call bell and telephone within pt's reach, no acute distress noted and pt denies c/o pain, will monitor     1114 pt sitting up on the side of the bed awake, no changes noted in pt's status, will monitor     1551 Bedside and Verbal shift change report given to CAITLIN Urias (oncoming nurse) by CAITLIN Leal (offgoing nurse). Report included the following information SBAR, Kardex and MAR.

## 2020-08-08 NOTE — PROGRESS NOTES
1920 Pt received from offgoing nurse without any signs or symptoms of distress. Pt vitals are stable and within normal limits. Pt bed in low position with wheels locked and call bell within reach. 2020 Assessment completed and documented in flow sheet. Pt denies any further needs at this time. Pt in NAD with bed in low position, wheels locked and call bell within reach. 2151 Scheduled medications administered as ordered. 2217 Scheduled medications administered as ordered. 8886 Reassessment completed with no changes noted. Bed locked, in lowest position, with call light within reach. Purposeful rounding completed. Pt resting quietly. No further needs voiced at this time. 0116 Scheduled medications administered as ordered. 0923 Scheduled medications administered as ordered. Reassessment completed with no changes noted. Bed locked, in lowest position, with call light within reach. Purposeful rounding completed. Pt resting quietly. No further needs voiced at this time. 3009 Scheduled medications administered as ordered. Purposeful rounding completed. Pt resting quietly. No further needs voiced at this time. 6449 Bedside and Verbal shift change report given to Keshav Tovar RN (oncoming nurse) by Carlo Morgan RN   (offgoing nurse). Report included the following information SBAR, Intake/Output, MAR and Recent Results.

## 2020-08-08 NOTE — PROGRESS NOTES
Problem: Falls - Risk of  Goal: *Absence of Falls  Description: Document Bertin Aly Fall Risk and appropriate interventions in the flowsheet. Outcome: Progressing Towards Goal  Note: Fall Risk Interventions:  Mobility Interventions: Assess mobility with egress test, Communicate number of staff needed for ambulation/transfer, Patient to call before getting OOB, PT Consult for mobility concerns, PT Consult for assist device competence, Strengthening exercises (ROM-active/passive), Utilize walker, cane, or other assistive device         Medication Interventions: Evaluate medications/consider consulting pharmacy, Patient to call before getting OOB, Teach patient to arise slowly                   Problem: Patient Education: Go to Patient Education Activity  Goal: Patient/Family Education  Outcome: Progressing Towards Goal     Problem: Pressure Injury - Risk of  Goal: *Prevention of pressure injury  Description: Document Nikos Scale and appropriate interventions in the flowsheet.   Outcome: Progressing Towards Goal  Note: Pressure Injury Interventions:  Sensory Interventions: Assess changes in LOC, Pressure redistribution bed/mattress (bed type)    Moisture Interventions: Absorbent underpads, Maintain skin hydration (lotion/cream)    Activity Interventions: Increase time out of bed, Pressure redistribution bed/mattress(bed type), PT/OT evaluation    Mobility Interventions: Pressure redistribution bed/mattress (bed type), PT/OT evaluation    Nutrition Interventions: Document food/fluid/supplement intake    Friction and Shear Interventions: HOB 30 degrees or less                Problem: Patient Education: Go to Patient Education Activity  Goal: Patient/Family Education  Outcome: Progressing Towards Goal

## 2020-08-09 LAB
ANION GAP SERPL CALC-SCNC: 7 MMOL/L (ref 3–18)
BUN SERPL-MCNC: 40 MG/DL (ref 7–18)
BUN/CREAT SERPL: 34 (ref 12–20)
CALCIUM SERPL-MCNC: 7.7 MG/DL (ref 8.5–10.1)
CHLORIDE SERPL-SCNC: 101 MMOL/L (ref 100–111)
CO2 SERPL-SCNC: 29 MMOL/L (ref 21–32)
CREAT SERPL-MCNC: 1.19 MG/DL (ref 0.6–1.3)
ERYTHROCYTE [DISTWIDTH] IN BLOOD BY AUTOMATED COUNT: 13.1 % (ref 11.6–14.5)
GLUCOSE BLD STRIP.AUTO-MCNC: 146 MG/DL (ref 70–110)
GLUCOSE BLD STRIP.AUTO-MCNC: 152 MG/DL (ref 70–110)
GLUCOSE BLD STRIP.AUTO-MCNC: 182 MG/DL (ref 70–110)
GLUCOSE BLD STRIP.AUTO-MCNC: 195 MG/DL (ref 70–110)
GLUCOSE SERPL-MCNC: 151 MG/DL (ref 74–99)
HCT VFR BLD AUTO: 29.5 % (ref 36–48)
HGB BLD-MCNC: 9.7 G/DL (ref 13–16)
MCH RBC QN AUTO: 29.2 PG (ref 24–34)
MCHC RBC AUTO-ENTMCNC: 32.9 G/DL (ref 31–37)
MCV RBC AUTO: 88.9 FL (ref 74–97)
PLATELET # BLD AUTO: 319 K/UL (ref 135–420)
PMV BLD AUTO: 8.7 FL (ref 9.2–11.8)
POTASSIUM SERPL-SCNC: 3.4 MMOL/L (ref 3.5–5.5)
RBC # BLD AUTO: 3.32 M/UL (ref 4.7–5.5)
SODIUM SERPL-SCNC: 137 MMOL/L (ref 136–145)
WBC # BLD AUTO: 6.6 K/UL (ref 4.6–13.2)

## 2020-08-09 PROCEDURE — 85027 COMPLETE CBC AUTOMATED: CPT

## 2020-08-09 PROCEDURE — 74011000250 HC RX REV CODE- 250: Performed by: HOSPITALIST

## 2020-08-09 PROCEDURE — 77010033678 HC OXYGEN DAILY

## 2020-08-09 PROCEDURE — 36415 COLL VENOUS BLD VENIPUNCTURE: CPT

## 2020-08-09 PROCEDURE — 94760 N-INVAS EAR/PLS OXIMETRY 1: CPT

## 2020-08-09 PROCEDURE — 82962 GLUCOSE BLOOD TEST: CPT

## 2020-08-09 PROCEDURE — 74011636637 HC RX REV CODE- 636/637: Performed by: FAMILY MEDICINE

## 2020-08-09 PROCEDURE — 74011250636 HC RX REV CODE- 250/636: Performed by: FAMILY MEDICINE

## 2020-08-09 PROCEDURE — 80048 BASIC METABOLIC PNL TOTAL CA: CPT

## 2020-08-09 PROCEDURE — 74011000250 HC RX REV CODE- 250: Performed by: FAMILY MEDICINE

## 2020-08-09 PROCEDURE — 94640 AIRWAY INHALATION TREATMENT: CPT

## 2020-08-09 PROCEDURE — 74011250636 HC RX REV CODE- 250/636: Performed by: INTERNAL MEDICINE

## 2020-08-09 PROCEDURE — 74011250637 HC RX REV CODE- 250/637: Performed by: FAMILY MEDICINE

## 2020-08-09 PROCEDURE — 65660000000 HC RM CCU STEPDOWN

## 2020-08-09 PROCEDURE — 74011250637 HC RX REV CODE- 250/637: Performed by: HOSPITALIST

## 2020-08-09 RX ADMIN — IPRATROPIUM BROMIDE AND ALBUTEROL SULFATE 3 ML: .5; 3 SOLUTION RESPIRATORY (INHALATION) at 15:21

## 2020-08-09 RX ADMIN — Medication 10 ML: at 21:36

## 2020-08-09 RX ADMIN — CEFTRIAXONE 1 G: 1 INJECTION, POWDER, FOR SOLUTION INTRAMUSCULAR; INTRAVENOUS at 01:34

## 2020-08-09 RX ADMIN — ARFORMOTEROL TARTRATE 15 MCG: 15 SOLUTION RESPIRATORY (INHALATION) at 07:12

## 2020-08-09 RX ADMIN — AZITHROMYCIN 500 MG: 500 INJECTION, POWDER, LYOPHILIZED, FOR SOLUTION INTRAVENOUS at 01:35

## 2020-08-09 RX ADMIN — Medication 10 ML: at 12:34

## 2020-08-09 RX ADMIN — DILTIAZEM HYDROCHLORIDE 30 MG: 30 TABLET, FILM COATED ORAL at 06:31

## 2020-08-09 RX ADMIN — GUAIFENESIN 600 MG: 600 TABLET, EXTENDED RELEASE ORAL at 10:00

## 2020-08-09 RX ADMIN — METHYLPREDNISOLONE SODIUM SUCCINATE 40 MG: 40 INJECTION, POWDER, FOR SOLUTION INTRAMUSCULAR; INTRAVENOUS at 10:00

## 2020-08-09 RX ADMIN — BUDESONIDE 500 MCG: 0.5 INHALANT RESPIRATORY (INHALATION) at 07:12

## 2020-08-09 RX ADMIN — HEPARIN SODIUM 5000 UNITS: 5000 INJECTION INTRAVENOUS; SUBCUTANEOUS at 12:33

## 2020-08-09 RX ADMIN — BUDESONIDE 500 MCG: 0.5 INHALANT RESPIRATORY (INHALATION) at 20:29

## 2020-08-09 RX ADMIN — IPRATROPIUM BROMIDE AND ALBUTEROL SULFATE 3 ML: .5; 3 SOLUTION RESPIRATORY (INHALATION) at 00:18

## 2020-08-09 RX ADMIN — DILTIAZEM HYDROCHLORIDE 30 MG: 30 TABLET, FILM COATED ORAL at 17:18

## 2020-08-09 RX ADMIN — GUAIFENESIN 600 MG: 600 TABLET, EXTENDED RELEASE ORAL at 21:35

## 2020-08-09 RX ADMIN — HEPARIN SODIUM 5000 UNITS: 5000 INJECTION INTRAVENOUS; SUBCUTANEOUS at 05:12

## 2020-08-09 RX ADMIN — IPRATROPIUM BROMIDE AND ALBUTEROL SULFATE 3 ML: .5; 3 SOLUTION RESPIRATORY (INHALATION) at 07:12

## 2020-08-09 RX ADMIN — INSULIN LISPRO 2 UNITS: 100 INJECTION, SOLUTION INTRAVENOUS; SUBCUTANEOUS at 06:31

## 2020-08-09 RX ADMIN — TAMSULOSIN HYDROCHLORIDE 0.4 MG: 0.4 CAPSULE ORAL at 17:18

## 2020-08-09 RX ADMIN — HEPARIN SODIUM 5000 UNITS: 5000 INJECTION INTRAVENOUS; SUBCUTANEOUS at 21:36

## 2020-08-09 RX ADMIN — INSULIN LISPRO 2 UNITS: 100 INJECTION, SOLUTION INTRAVENOUS; SUBCUTANEOUS at 17:19

## 2020-08-09 RX ADMIN — CHLORTHALIDONE 25 MG: 25 TABLET ORAL at 10:00

## 2020-08-09 RX ADMIN — DILTIAZEM HYDROCHLORIDE 30 MG: 30 TABLET, FILM COATED ORAL at 12:33

## 2020-08-09 RX ADMIN — IPRATROPIUM BROMIDE AND ALBUTEROL SULFATE 3 ML: .5; 3 SOLUTION RESPIRATORY (INHALATION) at 20:29

## 2020-08-09 RX ADMIN — ARFORMOTEROL TARTRATE 15 MCG: 15 SOLUTION RESPIRATORY (INHALATION) at 20:29

## 2020-08-09 RX ADMIN — INSULIN LISPRO 2 UNITS: 100 INJECTION, SOLUTION INTRAVENOUS; SUBCUTANEOUS at 21:36

## 2020-08-09 RX ADMIN — Medication 10 ML: at 06:32

## 2020-08-09 RX ADMIN — IPRATROPIUM BROMIDE AND ALBUTEROL SULFATE 3 ML: .5; 3 SOLUTION RESPIRATORY (INHALATION) at 11:57

## 2020-08-09 NOTE — PROGRESS NOTES
[facility]                  Pulmonary, Critical Care, and Sleep Medicine     Name: Carlos Vallejo MRN: [id number]   : 1945 Hospital: [facility] [place]    Date: 2020        [facility] Note                                              Consult requesting physician: Dr. Angel Hendrix  Reason for Consult: COPD exacerbation. IMPRESSION:   Acute exacerbation of chronic obstructive pulmonary disease (COPD) ([facility]) J44.1     Acute respiratory failure with hypoxia and hypercarbia ([facility]) J96.01, J96.02     Tobacco dependence F17.200 ·     ·   Patient Active Problem List   Diagnosis Code    Hypertension I10    COPD (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 ([facility]) J44.9    Chronic renal disease, stage 3, moderately decreased glomerular filtration rate between 30-59 mL/min/1.73 square meter ([facility]) N18.3    Asbestosis ([address] J61    Acute exacerbation of chronic obstructive pulmonary disease (COPD) ([facility]) J44.1    Debility R53.81    Paroxysmal atrial fibrillation ([facility]) I48.0    Chronic respiratory failure with hypoxia ([facility]) J96.11    Tobacco dependence F17.200    Hyponatremia E87.1    Pleural effusion, right J90    COPD with acute exacerbation ([facility]) J44.1    Acute respiratory failure with hypoxia and hypercarbia ([facility]) J96.01, J96.02    Hyponatremia E87.1    Advanced care planning/counseling discussion Z71.89    Hypokalemia E87.6    COPD (chronic obstructive pulmonary disease) ([facility]) J44.9    CAP (community acquired pneumonia) J18.9                 RECOMMENDATIONS:   Worsening dyspnea on admission, likely multifactorial including COPD exacerbation and diastolic CHF exacerbation. Improving dyspnea, wheezing. Radiologic chest findings: No acute infiltrate. Chronic changes present. Oxygen: 1 LPM NC, keep SPO2 >91%. Bronchodilators: Continue DuoNeb every 4 hour, Brovana twice daily, Pulmicort twice daily for now.   Once he improves, change DuoNeb to Atrovent 4 times daily. Start albuterol as needed. Consider discharging home on nebulized medications: Brovana twice daily, Pulmicort twice daily, ipratropium 4 times daily, albuterol as needed. Steroids: solumedrol reduce dose to 40 mg IV daily. Can be changed to p.o. tomorrow. Diuretics: Chronic diastolic CHF and leg edema. Dyspnea and leg edema improved since on intermittent Lasix. Slightly elevated BUN and mildly elevated creatinine and so will hold off on Lasix today. Use Lasix as needed. Airway clearance: Continue incentive spirometer. Parthenia Howell as needed. Culture data: Blood culture: NGTD. Antibiotics: Continue Rocephin and Zithromax for COPD exacerbation/bronchitis. Can be changed to Augmentin or Zithromax to complete total 5-day course of antibiotics upon discharge. Mobilizing, PT, OT, incentive spirometry. FiO2 to keep SpO2 >=92%, HOB >=30 degree, aspiration precautions, aggressive pulmonary toileting, incentive spirometry, PT/OT eval and treat, mobilization. DVT Prophylaxis: Heparin  GI Prophylaxis: Low risk for stress ulcer  Other issues management by primary team and respective consultants. Further recommendations will be based on the patient's response to recommended treatment and results of the investigation ordered. Quality Care: PPI, DVT prophylaxis, HOB elevated, infection control all reviewed and addressed. Discussed with patient, radiologic work up showed, answered all questions to their satisfaction. Care plan discussed with nursing. F/u with Kathryn Heller in 2 weeks after hospital discharge. Subjective/History:     Levi Forbes is a 66 y.o. 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 No recent travel or exposure to item[employer] patient. No suspicion for COVID19 infection.     8/9/2020   After intermittent diuresing, dyspnea improving. Persistent leg edema. On 1 LPM NC. No fever, leukocytosis. Denies cough, sputum production. No dyspnea at rest.  Walking inside the room and up to the bathroom with minimal KUMAR. Wheezing has improved. Overall, patient feels much better.       Review of Systems:   HEENT: No epistaxis, no nasal drainage, no difficulty in swallowing, no redness in eyes  Respiratory: as above  Cardiovascular: no chest pain, no palpitations, +chronic leg edema, no syncope  Gastrointestinal: no abd pain, no vomiting, no diarrhea, no bleeding symptoms  Genitourinary: No urinary symptoms or hematuria  Integument/breast: No ulcers or rashes  Musculoskeletal:Neg  Neurological: No focal weakness, no seizures, no headaches  Behvioral/Psych: No anxiety, no depression  Constitutional: No fever, no chills, no weight loss, no night sweats       Intake/Output Summary (Last 24 hours) at 8/9/2020 1131  Last data filed at 8/9/2020 6907  Gross per 24 hour   Intake 480 ml   Output 2175 ml   Net -1695 ml         Immunization status:   Immunization History   Administered Date(s) Administered    Influenza High Dose Vaccine PF 09/10/2012, 11/04/2013, 10/26/2015, 08/30/2016    Influenza Vaccine 01/15/2017    Influenza Vaccine (Quad) PF 10/23/2018, 10/22/2019    Influenza Vaccine PF 10/06/2014    Pneumococcal Conjugate (PCV-13) 07/01/2015, 06/12/2018    Pneumococcal Polysaccharide (PPSV-23) 03/19/2010, 11/10/2011    Pneumococcal Vaccine (Unspecified Type) 10/01/2012, 10/01/2012          Allergies   Allergen Reactions    Aspirin Other (comments)     \"messes my stomach up\"        Past Medical History:   Diagnosis Date    Brain aneurysm     Cancer ([address]     prostate    COPD [facility]ronic obstructive [facility] disease) [address]     Hypertension     Nocturia     Pneumonia     Radiation effect         Past Surgical History:   Procedure Laterality Date    HX HERNIA REPAIR          Family History   Problem Relation Age of Onset    Heart Disease Mother         Social History     Tobacco Use    Smoking status: Former Smoker     Types: Cigarettes    Smokeless tobacco: Never Used   Substance Use Topics    Alcohol use: No        Prior to Admission medications    Medication Sig Start Date End Date Taking? Authorizing Provider   furosemide (Lasix) 20 mg tablet Take 20 mg by mouth daily. Yes Provider, Historical   fluticasone propionate (Flonase Allergy Relief) 50 mcg/actuation nasal spray 2 Sprays by Both Nostrils route daily as needed. Yes Provider, Historical   tiotropium-olodateroL (Stiolto Respimat) 2.5-2.5 mcg/actuation inhaler Take 2 Puffs by inhalation daily. Yes Provider, Historical   dilTIAZem (CARDIZEM) 30 mg tablet Take 1 Tab by mouth Before breakfast, lunch, and dinner. Patient taking differently: Take 30 mg by mouth daily. 4/14/20   Prashant Carias MD   chlorthalidone (HYGROTEN) 25 mg tablet Take 25 mg by mouth daily. Other, MD Blayne   acetaminophen (TYLENOL) 325 mg tablet Take 650 mg by mouth every eight (8) hours as needed for Pain. Provider, Historical   dextran 70/hypromellose (ARTIFICIAL TEARS, PF, OP) Apply 2 Drops to eye as needed for Other (both eyes for dryness). Provider, Historical   diphenhydrAMINE (BENADRYL) 25 mg capsule Take 25 mg by mouth nightly as needed for Itching. Provider, Historical   albuterol (PROVENTIL HFA, VENTOLIN HFA, PROAIR HFA) 90 mcg/actuation inhaler Take 2 Puffs by inhalation every four (4) hours as needed for Wheezing or Shortness of Breath. 4/23/18   Vianey Bran PA-C   tamsulosin (FLOMAX) 0.4 mg capsule Take 0.4 mg by mouth every evening.     Other, MD Blayne       Current Facility-Administered Medications   Medication Dose Route Frequency    insulin lispro (HUMALOG) injection   SubCUTAneous AC&HS    arformoteroL (BROVANA) neb solution 15 mcg  15 mcg Nebulization BID RT    albuterol-ipratropium (DUO-NEB) 2.5 MG-0.5 MG/3 ML  3 mL Nebulization Q4H RT    guaiFENesin ER (MUCINEX) tablet 600 mg  600 mg Oral Q12H    methylPREDNISolone (PF) (SOLU-MEDROL) injection 40 mg  40 mg IntraVENous Q12H    albuterol CONCENTRATE 2.5mg/0.5 mL neb soln  2.5 mg Nebulization Q4H PRN    sodium chloride (NS) flush 5-40 mL  5-40 mL IntraVENous Q8H    sodium chloride (NS) flush 5-40 mL  5-40 mL IntraVENous PRN    budesonide (PULMICORT) 500 mcg/2 ml nebulizer suspension  500 mcg Nebulization BID RT    acetaminophen (TYLENOL) tablet 650 mg  650 mg Oral Q4H PRN    oxyCODONE IR (ROXICODONE) tablet 5 mg  5 mg Oral Q4H PRN    morphine injection 2 mg  2 mg IntraVENous Q4H PRN    naloxone (NARCAN) injection 0.4 mg  0.4 mg IntraVENous PRN    ondansetron (ZOFRAN) injection 4 mg  4 mg IntraVENous Q4H PRN    bisacodyL (DULCOLAX) tablet 5 mg  5 mg Oral DAILY PRN    LORazepam (ATIVAN) injection 1 mg  1 mg IntraVENous Q6H PRN    heparin (porcine) injection 5,000 Units  5,000 Units SubCUTAneous Q8H    chlorthalidone (HYGROTEN) tablet 25 mg  25 mg Oral DAILY    carboxymethylcellulose sodium (REFRESH PLUS) 0.5 % ophthalmic solution 2 Drop  2 Drop Both Eyes PRN    dilTIAZem IR (CARDIZEM) tablet 30 mg  30 mg Oral TIDAC    tamsulosin (FLOMAX) capsule 0.4 mg  0.4 mg Oral QPM    cefTRIAXone (ROCEPHIN) 1 g in sterile water (preservative free) 10 mL IV syringe  1 g IntraVENous Q24H    azithromycin (ZITHROMAX) 500 mg in 0.9% sodium chloride 250 mL (VIAL-MATE)  500 mg IntraVENous Q24H         Objective:   Vital Signs:    Visit Vitals  /76   Pulse 77   Temp 98.6 °F (37 °C)   Resp 16   Ht 5' 8\" (1.727 m)   Wt 98.8 kg (217 lb 14.4 oz)   SpO2 100%   BMI 33.13 kg/m²       O2 Device: Nasal cannula   O2 Flow Rate (L/min): 1 l/min   Temp (24hrs), Av.2 °F (36.8 °C), Min:97.5 °F (36.4 °C), Max:98.6 °F (37 °C)       Intake/Output:   Last shift:      701 - 1900  In: 480 [P.O.:480]  Out: -   Last 3 shifts: 1901 - 700  In: 860 [P.O.:600;  I.V.:260]  Out: 3750 [Urine:3750]    Intake/Output Summary (Last 24 hours) at 8/9/2020 1131  Last data filed at 8/9/2020 4390  Gross per 24 hour   Intake 480 ml   Output 2175 ml   Net -1695 ml       Physical Exam:     General/Neurology: Alert, Awake, NAD. Head:   Normocephalic, without obvious abnormality, atraumatic. Eye:   EOM intact, no scleral icterus, no pallor, no cyanosis. Lung: Moderate air entry bilateral equal. No stridors. No prolongded expiration. No accessory muscle use. No expiratory wheezing. Bilateral scattered crackles at the bases. Heart:   Regular rate & rhythm. S1 S2 present. No murmur. No JVD. Abdomen:  Soft. NT. ND. +BS. No masses. Extremities:  Trace bilateral leg pitting edema up to the knee. No cyanosis. No clubbing. Pulses: 2+ and symmetric in DP. Capillary refill: normal.   Lymphatic:  No cervical or supraclavicular palpable lymphadenopathy. Skin:   Color, texture, turgor normal. No rashes or lesions.        Data:       Recent Results (from the past 24 hour(s))   GLUCOSE, POC    Collection Time: 08/08/20 12:06 PM   Result Value Ref Range    Glucose (POC) 152 (H) 70 - 110 mg/dL   GLUCOSE, POC    Collection Time: 08/08/20  4:07 PM   Result Value Ref Range    Glucose (POC) 229 (H) 70 - 110 mg/dL   GLUCOSE, POC    Collection Time: 08/08/20  9:29 PM   Result Value Ref Range    Glucose (POC) 129 (H) 70 - 110 mg/dL   CBC W/O DIFF    Collection Time: 08/09/20  5:05 AM   Result Value Ref Range    WBC 6.6 4.6 - 13.2 K/uL    RBC 3.32 (L) 4.70 - 5.50 M/uL    HGB 9.7 (L) 13.0 - 16.0 g/dL    HCT 29.5 (L) 36.0 - 48.0 %    MCV 88.9 74.0 - 97.0 FL    MCH 29.2 24.0 - 34.0 PG    MCHC 32.9 31.0 - 37.0 g/dL    RDW 13.1 11.6 - 14.5 %    PLATELET 307 144 - 109 K/uL    MPV 8.7 (L) 9.2 - 16.4 FL   METABOLIC PANEL, BASIC    Collection Time: 08/09/20  5:05 AM   Result Value Ref Range    Sodium 137 136 - 145 mmol/L    Potassium 3.4 (L) 3.5 - 5.5 mmol/L    Chloride 101 100 - 111 mmol/L    CO2 29 21 - 32 mmol/L    Anion gap 7 3.0 - 18 mmol/L    Glucose 151 (H) 74 - 99 mg/dL    BUN 40 (H) 7.0 - 18 MG/DL    Creatinine 1.19 0.6 - 1.3 MG/DL    BUN/Creatinine ratio 34 (H) 12 - 20      GFR est AA >60 >60 ml/min/1.73m2    GFR est non-AA 59 (L) >60 ml/min/1.73m2    Calcium 7.7 (L) 8.5 - 10.1 MG/DL   GLUCOSE, POC    Collection Time: 08/09/20  6:25 AM   Result Value Ref Range    Glucose (POC) 152 (H) 70 - 110 mg/dL   GLUCOSE, POC    Collection Time: 08/09/20 11:04 AM   Result Value Ref Range    Glucose (POC) 146 (H) 70 - 110 mg/dL         Chemistry Recent Labs     08/09/20  0505 08/08/20  0500 08/07/20  0500   * 154* 156*    134* 134*   K 3.4* 3.7 3.0*    99* 94*   CO2 29 28 28   BUN 40* 42* 41*   CREA 1.19 1.33* 1.49*   CA 7.7* 8.0* 8.1*   AGAP 7 7 12   BUCR 34* 32* 28*        Lactic Acid Lactic acid   Date Value Ref Range Status   08/04/2020 1.3 0.4 - 2.0 MMOL/L Final     No results for input(s): LAC in the last 72 hours. Liver Enzymes Protein, total   Date Value Ref Range Status   08/04/2020 6.5 6.4 - 8.2 g/dL Final     Albumin   Date Value Ref Range Status   08/04/2020 3.3 (L) 3.4 - 5.0 g/dL Final     Globulin   Date Value Ref Range Status   08/04/2020 3.2 2.0 - 4.0 g/dL Final     A-G Ratio   Date Value Ref Range Status   08/04/2020 1.0 0.8 - 1.7   Final     Alk. phosphatase   Date Value Ref Range Status   08/04/2020 98 45 - 117 U/L Final     No results for input(s): TP, ALB, GLOB, AGRAT, AP, TBIL in the last 72 hours. No lab exists for component: SGOT, GPT, DBIL     CBC w/Diff Recent Labs     08/09/20  0505 08/08/20  0500 08/07/20  0500   WBC 6.6 8.3 11.6   RBC 3.32* 3.32* 3.48*   HGB 9.7* 9.6* 10.1*   HCT 29.5* 29.0* 30.5*    324 343        Cardiac Enzymes No results found for: CPK, CK, CKMMB, CKMB, RCK3, CKMBT, CKNDX, CKND1, WILLARD, TROPT, TROIQ, DENNYS, TROPT, TNIPOC, BNP, BNPP     BNP No results found for: BNP, BNPP, XBNPT     Coagulation No results for input(s): PTP, INR, APTT, INREXT, INREXT in the last 72 hours.       Thyroid  Lab Results   Component Value Date/Time    TSH 0.86 10/20/2019 01:05 AM       No results found for: T4     Urinalysis Lab Results   Component Value Date/Time    Color YELLOW 08/05/2020 03:40 AM    Appearance CLEAR 08/05/2020 03:40 AM    Specific gravity 1.015 08/05/2020 03:40 AM    pH (UA) 5.5 08/05/2020 03:40 AM    Protein Negative 08/05/2020 03:40 AM    Glucose Negative 08/05/2020 03:40 AM    Ketone Negative 08/05/2020 03:40 AM    Bilirubin Negative 08/05/2020 03:40 AM    Urobilinogen 1.0 08/05/2020 03:40 AM    Nitrites Negative 08/05/2020 03:40 AM    Leukocyte Esterase Negative 08/05/2020 03:40 AM    Bacteria FEW (A) 09/08/2018 12:30 PM    WBC 0 to 3 09/08/2018 12:30 PM    RBC 0 to 3 09/08/2018 12:30 PM        Micro  No results for input(s): SDES, CULT in the last 72 hours. No results for input(s): CULT in the last 72 hours. ABG Recent Labs     08/06/20  1416   PHI 7.43   PCO2I 36.9   PO2I 114*   HCO3I 24.6   FIO2I 26          Echo 6/15/20:  Result status: Final result    · Image quality for this study was technically difficult. Contrast used: DEFINITY. · Normal cavity size, wall thickness and systolic function (ejection fraction normal). Estimated left ventricular ejection fraction is 60 - 65%. Mild (grade 1) left ventricular diastolic dysfunction E/E' ratio is 11. 16.  · Mildly dilated left atrium. · Mildly dilated right ventricle. · Mildly dilated right atrium. · Probably trileaflet aortic valve. · Mitral valve non-specific thickening. Mild mitral annular calcification. Trace mitral valve regurgitation is present. · Pulmonary arterial systolic pressure is 25 mmHg. Pulmonary hypertension not suggested by Doppler findings. · Severely elevated central venous pressure (15+ mmHg); IVC diameter is larger than 21 mm and collapses less than 50% with respiration.               CT (Most Recent) (CT chest reviewed by me) Results from [place] encounter on 05/29/20   CT LOW DOSE LUNG CANCER SCREENING    Narrative EXAM: CT Chest, lung cancer screening    CLINICAL INDICATION/HISTORY: Lung cancer screening, cigarette/nicotine  dependence. COMPARISON: CTA of the chest 10/31/2019. TECHNIQUE: Chest CT from thoracic inlet through the diaphragm without contrast.  A low-dose lung cancer screening protocol was performed. Note that visualization  of non-pulmonary soft tissue structures is limited with this protocol. Coronal  and sagittal reformats were generated and reviewed. One or more dose reduction  techniques were used on this CT: automated exposure control, adjustment of the  mAs and/or kVp according to patient size, and iterative reconstruction  techniques. The specific techniques used on this CT exam have been documented  in the patient's electronic medical record. Digital Imaging and Communications  in Medicine (DICOM) format image data are available to nonaffiliated external  healthcare facilities or entities on a secure, media free, reciprocally  searchable basis with patient authorization for at least a 12-month period after  this study. DLP: 67    _______________    FINDINGS:    LUNGS:    Nodules: No suspicious nodule. Other pulmonary findings:  Focally calcified area of soft tissue thickening along the right pleural margin  both anteriorly and posteriorly is unchanged in appearance since prior  examination. OTHER:     Coronary artery ossifications are present.     _______________        Impression IMPRESSION:    No suspicious pulmonary nodule. Focal areas of peripheral pleural thickening  with peripheral calcification is unchanged in appearance since prior  examinations. Lung-RADS 1 - Negative. Management: Continue annual screening with low dose CT in 12 months. Lung cancer screening categorization and recommendations per the [employer] of radiology Lung-RADS version 1.1. XR (Most Recent).  CXR  reviewed by me and compared with previous CXR Results from [facility] [place] [facility]counter encounter on 08/04/20   XR CHEST PORT    Narrative EXAM: XR CHEST PORT    CLINICAL INDICATION/HISTORY: sob  -Additional: None    COMPARISON: 8/4/2020    TECHNIQUE: Portable frontal view of the chest    _______________    FINDINGS:    SUPPORT DEVICES: None. HEART AND MEDIASTINUM: Cardiomediastinal silhouette within normal limits. LUNGS AND PLEURAL SPACES: Hypoinflated lungs. Pleural plaques. Bibasilar  atelectasis. No large effusion or pneumothorax.    _______________      Impression IMPRESSION:    No acute cardiopulmonary abnormality. Hypoinflated lungs. Pleural plaques. Bibasilar atelectasis.           Clarence Alan MD  8/9/2020

## 2020-08-09 NOTE — ROUTINE PROCESS
2059-alert and oriented x 4. Lungs with expiratory wheezes and coarse throughout. On 1.5 liters nasal canula. BS active x 4 quads. ABD tight and distended. Denies pain. On tele box 35 showing NSR. IV access to right AC, flushed. On contact precautions for MRSA in sputum. Shift summary-IV ATB given with no adverse effects. Continues on box 35 showing NSR. Patient with productive cough. Remains on contact precautions for MRSA in sputum. Using urinal through the noc. Remains on 1LNC. Denies pain.

## 2020-08-09 NOTE — PROGRESS NOTES
Hospitalist Progress Note    Patient: Liliana Rey MRN: [id number]  CSN: [id number]    YOB: 1945  Age: 66 y.o. Sex: male    DOA: 8/4/2020 LOS:  LOS: 4 days            Assessment/Plan     Principal Problem:    Acute [facility] pulmonary [facility] (2/8/2019)    Active Problems:    Hypokalemia (4/14/2020)      COPD (chronic obstructive pulmonary disease) ([facility] (8/5/2020)      CAP (community acquired pneumonia) (8/5/2020)    Acute COPD exacerbation: improving, continue Rocephin and azithromycin.   cxr is no edema   Continue IV steroid and breathing tx      CAP: Continue iv abx    Breathing tx as needed      Asbestosis-irreversable disease ,chronic O2 user at home   Dnr, still want to be intubated for 2 weeks if needed      Hyponatremia : Continue monitoring      PAF : Continue monitor. Rate controlled with Cardizem  Optimize electrolytes      Hypokalemia : K replacement      Disposition : Home with home health tomorrow     CC: COPD exacerbation, CAP, hyponatremia, PAF           Subjective:      Pt was seen and examined with the nurse in the morning round.      Feeling better. Positive cough, less shortness of breath  \"Dr. Jose Fink said I can go home tomorrow\"    Review of systems  General: No fevers or chills. Cardiovascular: No chest pain or pressure. No palpitations. Pulmonary: No cough, SOB  Gastrointestinal: No nausea, vomiting. Objective:      Visit Vitals  /60   Pulse 80   Temp 98.8 °F (37.1 °C)   Resp 16   Ht 5' 8\" (1.727 m)   Wt 98.8 kg (217 lb 14.4 oz)   SpO2 100%   BMI 33.13 kg/m²       Physical Exam:    Gen: NAD, non-toxic. Heent:  MMM, NC, AT. Cor: s1s2 RRR. No MRG. PMI mid 5th intercostal space. Resp: Dreased breathing sound bilateral  Abd:  NT ND.  BS positive. No rebound or guarding. No masses. Ext: No edema or cyanosis.       Intake and Output:  Current Shift:  08/09 0701 - 08/09 1900  In: 960 [P.O.:960]  Out: 250 [Urine:250]  Last three shifts:  08/07 1901 - 08/09 0700  In: 18 [P.O.:600; I.V.:260]  Out: 3750 [Urine:3750]    Labs: Results:       Chemistry Recent Labs     08/09/20  0505 08/08/20  0500 08/07/20  0500   * 154* 156*    134* 134*   K 3.4* 3.7 3.0*    99* 94*   CO2 29 28 28   BUN 40* 42* 41*   CREA 1.19 1.33* 1.49*   CA 7.7* 8.0* 8.1*   AGAP 7 7 12   BUCR 34* 32* 28*      CBC w/Diff Recent Labs     08/09/20  0505 08/08/20  0500 08/07/20  0500   WBC 6.6 8.3 11.6   RBC 3.32* 3.32* 3.48*   HGB 9.7* 9.6* 10.1*   HCT 29.5* 29.0* 30.5*    324 343      Cardiac Enzymes No results for input(s): CPK, CKND1, WILLARD in the last 72 hours. No lab exists for component: CKRMB, TROIP   Coagulation No results for input(s): PTP, INR, APTT, INREXT in the last 72 hours. Lipid Panel No results found for: CHOL, CHOLPOCT, CHOLX, CHLST, CHOLV, 080179, HDL, HDLP, LDL, LDLC, DLDLP, 246193, VLDLC, VLDL, TGLX, TRIGL, TRIGP, TGLPOCT, CHHD, CHHDX   BNP No results for input(s): BNPP in the last 72 hours. Liver Enzymes No results for input(s): TP, ALB, TBIL, AP in the last 72 hours.     No lab exists for component: SGOT, GPT, DBIL   Thyroid Studies Lab Results   Component Value Date/Time    TSH 0.86 10/20/2019 01:05 AM        Procedures/imaging: see electronic medical records for all procedures/Xrays and details which were not copied into this note but were reviewed prior to creation of Plan      Medications Reviewed  Lauren Hope MD

## 2020-08-09 NOTE — ROUTINE PROCESS
0700: received bedside shift report from Jordin Hawthorne RN (offgoing nurse) and assumed care of the pt. Updated white board, assessed all current needs, left bed in lowest position with wheels locked and call light within reach. No current outstanding needs at this time. 1900: Shift summary, shift uneventful, no complaints of chest pain or shortness of breath.        Odalys Gannon RN

## 2020-08-09 NOTE — ROUTINE PROCESS
Bedside shift change report given to Tayla Nance RN (oncoming nurse) by Boby Redd RN (offgoing nurse). Report included the following information SBAR, Kardex, Intake/Output, MAR and Cardiac Rhythm SR.      Visit Vitals  /60   Pulse 80   Temp 98.8 °F (37.1 °C)   Resp 16   Ht 5' 8\" (1.727 m)   Wt 98.8 kg (217 lb 14.4 oz)   SpO2 99%   BMI 33.13 kg/m²       Boby Redd RN

## 2020-08-10 ENCOUNTER — HOME HEALTH ADMISSION (OUTPATIENT)
Dept: HOME HEALTH SERVICES | Facility: HOME HEALTH | Age: 75
End: 2020-08-10
Payer: MEDICARE

## 2020-08-10 VITALS
DIASTOLIC BLOOD PRESSURE: 75 MMHG | RESPIRATION RATE: 16 BRPM | OXYGEN SATURATION: 98 % | WEIGHT: 217.9 LBS | HEIGHT: 68 IN | TEMPERATURE: 98.4 F | HEART RATE: 75 BPM | SYSTOLIC BLOOD PRESSURE: 156 MMHG | BODY MASS INDEX: 33.02 KG/M2

## 2020-08-10 LAB
BACTERIA SPEC CULT: NORMAL
GLUCOSE BLD STRIP.AUTO-MCNC: 117 MG/DL (ref 70–110)
SERVICE CMNT-IMP: NORMAL

## 2020-08-10 PROCEDURE — 74011250636 HC RX REV CODE- 250/636: Performed by: FAMILY MEDICINE

## 2020-08-10 PROCEDURE — 97535 SELF CARE MNGMENT TRAINING: CPT

## 2020-08-10 PROCEDURE — 74011250637 HC RX REV CODE- 250/637: Performed by: FAMILY MEDICINE

## 2020-08-10 PROCEDURE — 94640 AIRWAY INHALATION TREATMENT: CPT

## 2020-08-10 PROCEDURE — 74011250636 HC RX REV CODE- 250/636: Performed by: INTERNAL MEDICINE

## 2020-08-10 PROCEDURE — 82962 GLUCOSE BLOOD TEST: CPT

## 2020-08-10 PROCEDURE — 94760 N-INVAS EAR/PLS OXIMETRY 1: CPT

## 2020-08-10 PROCEDURE — 74011250637 HC RX REV CODE- 250/637: Performed by: HOSPITALIST

## 2020-08-10 PROCEDURE — 77010033678 HC OXYGEN DAILY

## 2020-08-10 PROCEDURE — 74011000250 HC RX REV CODE- 250: Performed by: FAMILY MEDICINE

## 2020-08-10 PROCEDURE — 74011000250 HC RX REV CODE- 250: Performed by: HOSPITALIST

## 2020-08-10 RX ORDER — GUAIFENESIN 600 MG/1
600 TABLET, EXTENDED RELEASE ORAL EVERY 12 HOURS
Qty: 6 TAB | Refills: 0 | Status: SHIPPED | OUTPATIENT
Start: 2020-08-10 | End: 2020-08-13

## 2020-08-10 RX ORDER — ALBUTEROL SULFATE 1.25 MG/3ML
1.25 SOLUTION RESPIRATORY (INHALATION)
Qty: 120 NEBULE | Refills: 0 | Status: SHIPPED | OUTPATIENT
Start: 2020-08-10 | End: 2020-09-09

## 2020-08-10 RX ORDER — PREDNISONE 5 MG/1
TABLET ORAL
Qty: 21 TAB | Refills: 0 | Status: ON HOLD | OUTPATIENT
Start: 2020-08-10 | End: 2021-02-07 | Stop reason: SDUPTHER

## 2020-08-10 RX ORDER — POTASSIUM CHLORIDE 20 MEQ/1
40 TABLET, EXTENDED RELEASE ORAL
Status: COMPLETED | OUTPATIENT
Start: 2020-08-10 | End: 2020-08-10

## 2020-08-10 RX ORDER — ARFORMOTEROL TARTRATE 15 UG/2ML
15 SOLUTION RESPIRATORY (INHALATION) 2 TIMES DAILY
Qty: 60 VIAL | Refills: 0 | Status: ON HOLD | OUTPATIENT
Start: 2020-08-10 | End: 2021-02-05

## 2020-08-10 RX ORDER — IPRATROPIUM BROMIDE 0.5 MG/2.5ML
0.5 SOLUTION RESPIRATORY (INHALATION) EVERY 6 HOURS
Qty: 300 ML | Refills: 0 | Status: SHIPPED | OUTPATIENT
Start: 2020-08-10 | End: 2020-09-09

## 2020-08-10 RX ORDER — BUDESONIDE 0.5 MG/2ML
500 INHALANT ORAL 2 TIMES DAILY
Qty: 60 EACH | Refills: 0 | Status: ON HOLD | OUTPATIENT
Start: 2020-08-10 | End: 2021-02-05

## 2020-08-10 RX ADMIN — Medication 10 ML: at 06:19

## 2020-08-10 RX ADMIN — AZITHROMYCIN 500 MG: 500 INJECTION, POWDER, LYOPHILIZED, FOR SOLUTION INTRAVENOUS at 00:27

## 2020-08-10 RX ADMIN — METHYLPREDNISOLONE SODIUM SUCCINATE 40 MG: 40 INJECTION, POWDER, FOR SOLUTION INTRAMUSCULAR; INTRAVENOUS at 09:53

## 2020-08-10 RX ADMIN — ARFORMOTEROL TARTRATE 15 MCG: 15 SOLUTION RESPIRATORY (INHALATION) at 07:19

## 2020-08-10 RX ADMIN — HEPARIN SODIUM 5000 UNITS: 5000 INJECTION INTRAVENOUS; SUBCUTANEOUS at 06:19

## 2020-08-10 RX ADMIN — IPRATROPIUM BROMIDE AND ALBUTEROL SULFATE 3 ML: .5; 3 SOLUTION RESPIRATORY (INHALATION) at 07:18

## 2020-08-10 RX ADMIN — POTASSIUM CHLORIDE 40 MEQ: 20 TABLET, EXTENDED RELEASE ORAL at 09:53

## 2020-08-10 RX ADMIN — BUDESONIDE 500 MCG: 0.5 INHALANT RESPIRATORY (INHALATION) at 07:18

## 2020-08-10 RX ADMIN — IPRATROPIUM BROMIDE AND ALBUTEROL SULFATE 3 ML: .5; 3 SOLUTION RESPIRATORY (INHALATION) at 02:04

## 2020-08-10 RX ADMIN — GUAIFENESIN 600 MG: 600 TABLET, EXTENDED RELEASE ORAL at 09:52

## 2020-08-10 RX ADMIN — CEFTRIAXONE 1 G: 1 INJECTION, POWDER, FOR SOLUTION INTRAMUSCULAR; INTRAVENOUS at 00:27

## 2020-08-10 RX ADMIN — DILTIAZEM HYDROCHLORIDE 30 MG: 30 TABLET, FILM COATED ORAL at 06:20

## 2020-08-10 RX ADMIN — CHLORTHALIDONE 25 MG: 25 TABLET ORAL at 09:52

## 2020-08-10 NOTE — PROGRESS NOTES
Plan: home with [facility]IS[facility] VAL[facility]    Chart reviewed, noted pt planning discharge home today. Pt lives with wife and son, has home oxygen through [employer], has RW. No additional needs identified, CM remains available. CMS will assist with making follow up appointments. Care Management Interventions  PCP Verified by CM:  Yes  Transition of Care Consult (CM Consult): [address]: Yes  Physical Therapy Consult: Yes  Occupational Therapy Consult: Yes  Current Support Network: Lives with Spouse  Confirm Follow Up Transport: Family  The Plan for Transition of Care is Related to the Following Treatment Goals : HH  The Patient and/or Patient Representative was Provided with a Choice of Provider and Agrees with the Discharge Plan?: Yes  Freedom of Choice List was Provided with Basic Dialogue that Supports the Patient's Individualized Plan of Care/Goals, Treatment Preferences and Shares the Quality Data Associated with the Providers?: Yes  Discharge Location  Discharge Placement: Home with home health

## 2020-08-10 NOTE — PROGRESS NOTES
Problem: Falls - Risk of  Goal: *Absence of Falls  Description: Document Beth Correll Fall Risk and appropriate interventions in the flowsheet. Outcome: Resolved/Met  Note: Fall Risk Interventions:  Mobility Interventions: Assess mobility with egress test, Patient to call before getting OOB         Medication Interventions: Teach patient to arise slowly, Patient to call before getting OOB                   Problem: Patient Education: Go to Patient Education Activity  Goal: Patient/Family Education  Outcome: Resolved/Met     Problem: Pressure Injury - Risk of  Goal: *Prevention of pressure injury  Description: Document Nikos Scale and appropriate interventions in the flowsheet.   Outcome: Resolved/Met  Note: Pressure Injury Interventions:  Sensory Interventions: Assess changes in LOC, Keep linens dry and wrinkle-free, Maintain/enhance activity level    Moisture Interventions: Absorbent underpads, Check for incontinence Q2 hours and as needed    Activity Interventions: Pressure redistribution bed/mattress(bed type)    Mobility Interventions: HOB 30 degrees or less, Pressure redistribution bed/mattress (bed type), PT/OT evaluation    Nutrition Interventions: Document food/fluid/supplement intake, Offer support with meals,snacks and hydration    Friction and Shear Interventions: HOB 30 degrees or less                Problem: Patient Education: Go to Patient Education Activity  Goal: Patient/Family Education  Outcome: Resolved/Met     Problem: Pain  Goal: *Control of Pain  Outcome: Resolved/Met  Goal: *PALLIATIVE CARE:  Alleviation of Pain  Outcome: Resolved/Met     Problem: Patient Education: Go to Patient Education Activity  Goal: Patient/Family Education  Outcome: Resolved/Met     Problem: Chronic Obstructive Pulmonary Disease (COPD)  Goal: *Oxygen saturation during activity within specified parameters  Outcome: Resolved/Met  Goal: *Able to remain out of bed as prescribed  Outcome: Resolved/Met  Goal: *Absence of hypoxia  Outcome: Resolved/Met  Goal: *Optimize nutritional status  Outcome: Resolved/Met     Problem: Patient Education: Go to Patient Education Activity  Goal: Patient/Family Education  Outcome: Resolved/Met     Problem: Gas Exchange - Impaired  Goal: *Absence of hypoxia  Outcome: Resolved/Met     Problem: Patient Education: Go to Patient Education Activity  Goal: Patient/Family Education  Outcome: Resolved/Met     Problem: Patient Education: Go to Patient Education Activity  Goal: Patient/Family Education  Outcome: Resolved/Met     Problem: Patient Education: Go to Patient Education Activity  Goal: Patient/Family Education  Outcome: Resolved/Met

## 2020-08-10 NOTE — ROUTINE PROCESS
Bedside and Verbal shift change report given to [address] [place] [address] (oncoming nurse) by Kyle Akers  RN (offgoing nurse). Report included the following information SBAR and Kardex.

## 2020-08-10 NOTE — DISCHARGE INSTRUCTIONS
Patient Education        Chronic Obstructive Pulmonary Disease (COPD): Care Instructions  Your Care Instructions     Chronic obstructive pulmonary disease (COPD) is a general term for a group of lung diseases, including emphysema and chronic bronchitis. People with COPD have decreased airflow in and out of the lungs, which makes it hard to breathe. The airways also can get clogged with thick mucus. Cigarette smoking is a major cause of COPD. Although there is no cure for COPD, you can slow its progress. Following your treatment plan and taking care of yourself can help you feel better and live longer. Follow-up care is a key part of your treatment and safety. Be sure to make and go to all appointments, and call your doctor if you are having problems. It's also a good idea to know your test results and keep a list of the medicines you take. How can you care for yourself at home? Staying healthy  · Do not smoke. This is the most important step you can take to prevent more damage to your lungs. If you need help quitting, talk to your doctor about stop-smoking programs and medicines. These can increase your chances of quitting for good. · Avoid colds and flu. Get a pneumococcal vaccine shot. If you have had one before, ask your doctor whether you need a second dose. Get the flu vaccine every fall. If you must be around people with colds or the flu, wash your hands often. · Avoid secondhand smoke, air pollution, and high altitudes. Also avoid cold, dry air and hot, humid air. Stay at home with your windows closed when air pollution is bad. Medicines and oxygen therapy  · Take your medicines exactly as prescribed. Call your doctor if you think you are having a problem with your medicine. You may be taking medicines such as:  ? Bronchodilators. These help open your airways and make breathing easier. They are either short-acting (work for 6 to 9 hours) or long-acting (work for 24 hours).  You inhale most bronchodilators, so they start to act quickly. Always carry your quick-relief inhaler with you in case you need it while you are away from home. ? Corticosteroids (prednisone, budesonide). These reduce airway inflammation. They come in pill or inhaled form. You must take these medicines every day for them to work well. · Ask your doctor or pharmacist if a spacer is right for you. A spacer may help you get more inhaled medicine to your lungs. If you use one, ask how to use it properly. · Do not take any vitamins, over-the-counter medicine, or herbal products without talking to your doctor first.  · If your doctor prescribed antibiotics, take them as directed. Do not stop taking them just because you feel better. You need to take the full course of antibiotics. · If you use oxygen therapy, use the flow rate your doctor has recommended. Don't change it without talking to your doctor first. Oxygen therapy boosts the amount of oxygen in your blood and helps you breathe easier. Activity  · Get regular exercise. Walking is an easy way to get exercise. Start out slowly, and walk a little more each day. · Pay attention to your breathing. You are exercising too hard if you can't talk while you exercise. · Take short rest breaks when doing household chores and other activities. · Learn breathing methods--such as breathing through pursed lips--to help you become less short of breath. · If your doctor has not set you up with a pulmonary rehabilitation program, ask if rehab is right for you. Rehab includes exercise programs, education about your disease and how to manage it, help with diet and other changes, and emotional support. Diet  · Eat regular, healthy meals. Use bronchodilators about 1 hour before you eat to make it easier to eat. Eat several small meals instead of three large ones. Drink beverages at the end of the meal. Avoid foods that are hard to chew.   · Eat foods that contain protein so you don't lose muscle mass. · Talk with your doctor if you gain too much weight or if you lose weight without trying. Mental health  · Talk to your family, friends, or a therapist about your feelings. Some people feel frightened, angry, hopeless, helpless, and even guilty. Talking openly about bad feelings can help you cope. If these feelings last, talk to your doctor. When should you call for help? [id number] anytime you think you may need emergency care. For example, call if:  · You have severe trouble breathing. Call your doctor now or seek immediate medical care if:  · You have new or worse trouble breathing. · You cough up blood. · You have a fever. Watch closely for changes in your health, and be sure to contact your doctor if:  · You cough more deeply or more often, especially if you notice more mucus or a change in the color of your mucus. · You have new or worse swelling in your legs or belly. · You are not getting better as expected. Where can you learn more? Go to http://www.gray.com/  Enter E022 in the search box to learn more about \"Chronic Obstructive Pulmonary Disease (COPD): Care Instructions. \"  Current as of: February 24, 2020               Content Version: 12.5  © 8923-9558 Healthwise, Incorporated. Care instructions adapted under license by [employer] (which disclaims liability or warranty for this information). If you have questions about a medical condition or this instruction, always ask your healthcare professional. [place] [address] any warranty or liability for your use of this information.        Recommendations from [employer] Onur López, CNS:  Next that you see Dr. Taylor Zamora in 2-3 weeks, remember to ask:   -About Cardizem (one of your heart medications)  -About your electrolytes (like potassium)  -About your Lasix (the diuretic)    Next that you see Dr. Tena Ziegler in 2-3 weeks, remember to ask:  -About your new nebulizer treatments  -About your Stiolto  -About your Prednisone tablets    If you are able to maintain a regular appointment, then you can remain out of the hospital longer. Try discussing with your family to assist in your appointments. Also, we discussed if there was any mold or dustiness in your home. These things tend to irritate your lungs, making it more likely that you will have to return to the hospital. If it is possible, clean out your home of mold and dust.     Thank you for letting me participate in your care!   Chino Lafleur, MSN, RN, [facility]    Clinical Nurse Specialist  [address]  Office: [phone]   [address]: [phone]  [email or web address]

## 2020-08-10 NOTE — PROGRESS NOTES
Problem: Self Care Deficits Care Plan (Adult)  Goal: *Acute Goals and Plan of Care (Insert Text)  Description: Initial Occupational Therapy Goals (8/5/2020) Within 7 day(s):    1. Patient will perform grooming standing at sink with Supervision x 3-4 minutes for increased independence with ADLs. 2. Patient will perform UB dressing with setup for increased independence with ADLs. 3. Patient will perform LB dressing with minimal assist & A/E PRN for increased independence with ADLs. 4. Patient will perform all aspects of toileting with Sueprvision for increased independence in ADLs  5. Patient will independently apply energy conservation techniques with 1 verbal cue(s) for increased independence with ADLs. 6. Patient will utilize good body mechanics during ADLs with 1 verbal cue(s). 7. Patient will independently manage O2 tubing to safely ambulate to/from bathroom. 8. Patient will perform R shoulder ROM HEP w/ supervision for increased ROM >45 degrees for ADLs  Outcome: Resolved/Met     OCCUPATIONAL THERAPY TREATMENT/DISCHARGE    Patient: Balaji Oliveros (46 y.o. male)  Date: 8/10/2020  Diagnosis: COPD [facility]ch[facility] ([facility] [J44.9]   Acute exacerbation of chronic obstructive pulmonary disease (COPD) ([facility])       Precautions: Fall(O2)  PLOF: Patient requires assist w/ LE dressing from spouse and has declined d/t decreased respiratory status    Chart, occupational therapy assessment, plan of care, and goals were reviewed. ASSESSMENT:  Patient w/ significant improvement in R shoulder pain and able to use RUE to assist with ADLs. Pt setup for UB dressing. Pt setup for simple seated grooming task. PLAN:  Patient will be discharged from occupational therapy at this time.   Rationale for discharge:  [x] Goals Achieved  [] [address]  [] Patient not participating in therapy  [] Other:  Discharge Recommendations:  Home Health  Further Equipment Recommendations for Discharge:  N/A SUBJECTIVE:   Patient stated I guess the steroid did double duty.  (re: R shoulder pain)    OBJECTIVE DATA SUMMARY:   Cognitive/Behavioral Status:  Neurologic State: Alert  Orientation Level: Oriented X4  Cognition: Appropriate for age attention/concentration, Appropriate decision making, Appropriate safety awareness, Follows commands  Safety/Judgement: Awareness of environment, Fall prevention    ADL Intervention:  Upper Body Dressing Assistance  Pullover Shirt: Set-up    UE Therapeutic Exercises:   R shoulder near [place] for pt's baseline ~ 120 degrees    Pain:  Pain level pre-treatment: 2/10   Pain level post-treatment: 2/10   Pain Intervention(s): Medication administered by RN (see MAR); Rest, Ice, Repositioning   Response to intervention: Nurse notified, See doc flow sheet    Please refer to the flowsheet for vital signs taken during this treatment. After treatment:   []  Patient left in no apparent distress sitting up in chair  [x]  Patient left in no apparent distress in bed/sitting EOB  [x]  Call bell left within reach  [x]  Nursing notified  []  Caregiver present  []  Bed alarm activated    COMMUNICATION/EDUCATION:   [x]      Role of Occupational Therapy in the acute care setting  [x]      Home safety education was provided and the patient/caregiver indicated understanding. [x]      Patient/family have participated as able and agree with findings and recommendations. []      Patient is unable to participate in plan of care at this time. Thank you for allowing me to assist in the care of this patient.   Brina Lindsey, OTR/L, CSRS, CDCS  Time Calculation: 10 mins

## 2020-08-10 NOTE — ROUTINE PROCESS
Bedside and Verbal shift change report given to Jackye Spurling RN (oncoming nurse) by Endy Valencia (offgoing nurse). Report included the following information SBAR and Kardex. 0800 - Shift assessment complete. Pt progressing appropriately with no complaints of chest pain or tightness. 1130 - Shift summary - shift uneventful. Pt discharged to home.

## 2020-08-10 NOTE — DISCHARGE SUMMARY
Discharge Summary    Patient: Jennifer Kan MRN: [id number]  CSN: [id number]    YOB: 1945  Age: 66 y.o. Sex: male    DOA: 8/4/2020 LOS:  LOS: 5 days   Discharge Date:      Primary Care Provider:  Arvind Gan MD    Admission Diagnoses: COPD (chronic obstructive pulmonary disease) ([facility] [J44.9]    Discharge Diagnoses:    Hospital Problems  Date Reviewed: 6/13/2020          Codes Class Noted POA    COPD (chronic obstructive pulmonary disease) ([facility] ICD-10-CM: J44.9  ICD-9-CM: 794  8/5/2020 Unknown        CAP (community acquired pneumonia) ICD-10-CM: J18.9  ICD-9-CM: 486  8/5/2020 Unknown        Hypokalemia ICD-10-CM: E87.6  ICD-9-CM: 276.8  4/14/2020 Yes        * (Principal) Acute exacerbation of chronic obstructive pulmonary disease (COPD) ([facility] ICD-10-CM: J44.1  ICD-9-CM: 491.21  2/8/2019 Yes              Discharge Condition: stable     Discharge Medications:     Current Discharge Medication List      START taking these medications    Details   arformoteroL (BROVANA) 15 mcg/2 mL nebu neb solution 2 mL by Nebulization route two (2) times a day. Qty: 60 Vial, Refills: 0      budesonide (PULMICORT) 0.5 mg/2 mL nbsp 2 mL by Nebulization route two (2) times a day. Qty: 60 Each, Refills: 0      predniSONE (STERAPRED) 5 mg dose pack See administration instruction per 5mg dose pack  Qty: 21 Tab, Refills: 0      guaiFENesin ER (MUCINEX) 600 mg ER tablet Take 1 Tab by mouth every twelve (12) hours for 3 days. Qty: 6 Tab, Refills: 0      ipratropium (ATROVENT) 0.02 % soln 2.5 mL by Nebulization route every six (6) hours for 30 days. Qty: 300 mL, Refills: 0      albuterol (ACCUNEB) 1.25 mg/3 mL nebu 3 mL by Nebulization route every six (6) hours as needed for Wheezing or Shortness of Breath for up to 30 days. Qty: 120 Nebule, Refills: 0         CONTINUE these medications which have NOT CHANGED    Details   furosemide (Lasix) 20 mg tablet Take 20 mg by mouth daily.       dilTIAZem (CARDIZEM) 30 mg tablet Take 1 Tab by mouth Before breakfast, lunch, and dinner. Qty: 90 Tab, Refills: 0      acetaminophen (TYLENOL) 325 mg tablet Take 650 mg by mouth every eight (8) hours as needed for Pain. dextran 70/hypromellose (ARTIFICIAL TEARS, PF, OP) Apply 2 Drops to eye as needed for Other (both eyes for dryness). diphenhydrAMINE (BENADRYL) 25 mg capsule Take 25 mg by mouth nightly as needed for Itching. albuterol (PROVENTIL HFA, VENTOLIN HFA, PROAIR HFA) 90 mcg/actuation inhaler Take 2 Puffs by inhalation every four (4) hours as needed for Wheezing or Shortness of Breath. Qty: 1 Inhaler, Refills: 1      tamsulosin (FLOMAX) 0.4 mg capsule Take 0.4 mg by mouth every evening. STOP taking these medications       fluticasone propionate (Flonase Allergy Relief) 50 mcg/actuation nasal spray Comments:   Reason for Stopping:         tiotropium-olodateroL (Stiolto Respimat) 2.5-2.5 mcg/actuation inhaler Comments:   Reason for Stopping:         chlorthalidone (HYGROTEN) 25 mg tablet Comments:   Reason for Stopping:               Procedures : none     Consults: Pulmonary/Critical Care- Dr. Ross Bending   Visit Vitals  /75   Pulse 75   Temp 98.4 °F (36.9 °C)   Resp 16   Ht 5' 8\" (1.727 m)   Wt 98.8 kg (217 lb 14.4 oz)   SpO2 97%   BMI 33.13 kg/m²     General: Awake, cooperative, no acute distress    HEENT: NC, Atraumatic. PERRLA, EOMI. Anicteric sclerae. Lungs:  CTA Bilaterally. No Wheezing/Rhonchi/Rales. Heart:  Regular  rhythm,  No murmur, No Rubs, No Gallops  Abdomen: Soft, Non distended, Non tender. +Bowel sounds,   Extremities: No c/c/e  Psych:   Not anxious or agitated. Neurologic:  No acute neurological deficits. Admission HPI :   Omid Cuello is a 66 y.o. male who has h/o copd, htn, prostate cancer, and severe Belkofski who presents to the ED with CC of SOB and thinking that he is having a COPD exacerbation.  He reports having worsening SOB and KUMAR for past 2-3 days with associated cough. He denies fever, chills, nightsweats, nausea, diarrhea. Denies known covid-19 exposure. In the field, EMS found him very hypoxic with 02 sats in the 70s. Placed on NRB and by the time he arrived in the ED, his 02 sats were in the 90s. In the ED, he was given nebs and solumedrol. Because his WBC was elevated, ED physician treated him for CAP. Hospital Course :   Since pt  was admitted, iv steroid/breathing treatment were administrated with empiric iv abx. Glucose was controlled per SSI. Dr. Luis Manuel Sprague was on board-his primary pulmonologist. He received and completed 7 days abx rocephin and azithromycin for 5 days for cap. With the treatment, sob was back to his baseline and nc O2 remained home O2 level. He was cleared to be d/c per pulmonologist.   Dr. Luis Manuel Sprague recommended:   discharging home on nebulized medications: Brovana twice daily, Pulmicort twice daily, ipratropium 4 times daily, albuterol as needed    Palliative care was on board, he was dnr, not dni. Discharge planning discussed with patient, pt agrees  with the plan and no questions and concerns at this point. Activity: Activity as tolerated    Diet: Cardiac Diet    Follow-up: PCP and Dr. Luis Manuel Sprague     Disposition: home with home health     Minutes spent on discharge: 45 min       Labs: Results:       Chemistry Recent Labs     08/09/20  0505 08/08/20  0500   * 154*    134*   K 3.4* 3.7    99*   CO2 29 28   BUN 40* 42*   CREA 1.19 1.33*   CA 7.7* 8.0*   AGAP 7 7   BUCR 34* 32*      CBC w/Diff Recent Labs     08/09/20  0505 08/08/20  0500   WBC 6.6 8.3   RBC 3.32* 3.32*   HGB 9.7* 9.6*   HCT 29.5* 29.0*    324      Cardiac Enzymes No results for input(s): CPK, CKND1, WILLARD in the last 72 hours. No lab exists for component: CKRMB, TROIP   Coagulation No results for input(s): PTP, INR, APTT, INREXT in the last 72 hours.     Lipid Panel No results found for: CHOL, CHOLPOCT, CHOLX, CHLST, CHOLV, [id number], HDL, HDLP, LDL, LDLC, DLDLP, [id number], VLDLC, VLDL, TGLX, TRIGL, TRIGP, TGLPOCT, CHHD, CHHDX   BNP No results for input(s): BNPP in the last 72 hours. Liver Enzymes No results for input(s): TP, ALB, TBIL, AP in the last 72 hours. No lab exists for component: SGOT, GPT, DBIL   Thyroid Studies Lab Results   Component Value Date/Time    TSH 0.86 10/20/2019 01:05 AM          @micro    Significant Diagnostic Studies: Xr Chest Port    Result Date: 8/6/2020  EXAM: XR CHEST PORT CLINICAL INDICATION/HISTORY: sob -Additional: None COMPARISON: 8/4/2020 TECHNIQUE: Portable frontal view of the chest _______________ FINDINGS: SUPPORT DEVICES: None. HEART AND MEDIASTINUM: Cardiomediastinal silhouette within normal limits. LUNGS AND PLEURAL SPACES: Hypoinflated lungs. Pleural plaques. Bibasilar atelectasis. No large effusion or pneumothorax. _______________     IMPRESSION: No acute cardiopulmonary abnormality. Hypoinflated lungs. Pleural plaques. Bibasilar atelectasis. Xr Chest Port    Result Date: 8/4/2020  EXAM:  AP Portable Chest X-ray 1 view INDICATION: Shortness of breath COMPARISON: June 17, 2020 and CT chest dated October 31, 2019 _______________ FINDINGS:  Heart and mediastinal contours are within normal limits for portable radiograph. Lungs are clear of active disease. Parenchymal scarring seen at the right lung base. There are emphysematous changes. There are no pleural effusions. Extensive pleural calcifications are seen on the right similar to prior exam. No acute osseous findings. ________________      IMPRESSION:  No acute process. Chronic pleural-parenchymal scarring and calcification of the pleura in the right.             [facility]     CC: Roxie Rodas MD

## 2020-08-10 NOTE — PROGRESS NOTES
Problem: Falls - Risk of  Goal: *Absence of Falls  Description: Document Cristi Fernandez Fall Risk and appropriate interventions in the flowsheet. Outcome: Progressing Towards Goal  Note: Fall Risk Interventions:  Mobility Interventions: Patient to call before getting OOB         Medication Interventions: Teach patient to arise slowly                   Problem: Patient Education: Go to Patient Education Activity  Goal: Patient/Family Education  Outcome: Progressing Towards Goal     Problem: Pressure Injury - Risk of  Goal: *Prevention of pressure injury  Description: Document Nikos Scale and appropriate interventions in the flowsheet.   Outcome: Progressing Towards Goal  Note: Pressure Injury Interventions:  Sensory Interventions: Assess changes in LOC, Keep linens dry and wrinkle-free    Moisture Interventions: Absorbent underpads, Check for incontinence Q2 hours and as needed    Activity Interventions: Pressure redistribution bed/mattress(bed type)    Mobility Interventions: HOB 30 degrees or less    Nutrition Interventions: Document food/fluid/supplement intake    Friction and Shear Interventions: HOB 30 degrees or less                Problem: Patient Education: Go to Patient Education Activity  Goal: Patient/Family Education  Outcome: Progressing Towards Goal     Problem: Pain  Goal: *Control of Pain  Outcome: Progressing Towards Goal  Goal: *PALLIATIVE CARE:  Alleviation of Pain  Outcome: Progressing Towards Goal     Problem: Patient Education: Go to Patient Education Activity  Goal: Patient/Family Education  Outcome: Progressing Towards Goal     Problem: Chronic Obstructive Pulmonary Disease (COPD)  Goal: *Oxygen saturation during activity within specified parameters  Outcome: Progressing Towards Goal  Goal: *Able to remain out of bed as prescribed  Outcome: Progressing Towards Goal  Goal: *Absence of hypoxia  Outcome: Progressing Towards Goal  Goal: *Optimize nutritional status  Outcome: Progressing Towards Goal Problem: Patient Education: Go to Patient Education Activity  Goal: Patient/Family Education  Outcome: Progressing Towards Goal     Problem: Gas Exchange - Impaired  Goal: *Absence of hypoxia  Outcome: Progressing Towards Goal     Problem: Patient Education: Go to Patient Education Activity  Goal: Patient/Family Education  Outcome: Progressing Towards Goal     Problem: Patient Education: Go to Patient Education Activity  Goal: Patient/Family Education  Outcome: Progressing Towards Goal

## 2020-08-11 ENCOUNTER — PATIENT OUTREACH (OUTPATIENT)
Dept: CASE MANAGEMENT | Age: 75
End: 2020-08-11

## 2020-08-11 LAB
BACTERIA SPEC CULT: NORMAL
SERVICE CMNT-IMP: NORMAL

## 2020-08-11 NOTE — PROGRESS NOTES
Patient contacted regarding recent discharge and COVID-19 risk      CTN Attempt to contact patient via telephone on 8/11/20 for post hospital follow up. Unable to reach. Left message on voicemail with office contact information. No Patient medical information left on message.

## 2020-08-12 ENCOUNTER — HOME CARE VISIT (OUTPATIENT)
Dept: HOME HEALTH SERVICES | Facility: HOME HEALTH | Age: 75
End: 2020-08-12
Payer: MEDICARE

## 2020-08-12 ENCOUNTER — HOME CARE VISIT (OUTPATIENT)
Dept: HOME HEALTH SERVICES | Facility: HOME HEALTH | Age: 75
End: 2020-08-12

## 2020-08-12 ENCOUNTER — HOME CARE VISIT (OUTPATIENT)
Dept: SCHEDULING | Facility: HOME HEALTH | Age: 75
End: 2020-08-12
Payer: MEDICARE

## 2020-08-12 VITALS
TEMPERATURE: 97.7 F | OXYGEN SATURATION: 97 % | SYSTOLIC BLOOD PRESSURE: 126 MMHG | HEART RATE: 88 BPM | DIASTOLIC BLOOD PRESSURE: 74 MMHG | RESPIRATION RATE: 20 BRPM

## 2020-08-12 PROCEDURE — 400013 HH SOC

## 2020-08-12 PROCEDURE — 3331090002 HH PPS REVENUE DEBIT

## 2020-08-12 PROCEDURE — 3331090001 HH PPS REVENUE CREDIT

## 2020-08-12 PROCEDURE — G0299 HHS/HOSPICE OF RN EA 15 MIN: HCPCS

## 2020-08-13 ENCOUNTER — HOME CARE VISIT (OUTPATIENT)
Dept: HOME HEALTH SERVICES | Facility: HOME HEALTH | Age: 75
End: 2020-08-13
Payer: MEDICARE

## 2020-08-13 ENCOUNTER — HOME CARE VISIT (OUTPATIENT)
Dept: SCHEDULING | Facility: HOME HEALTH | Age: 75
End: 2020-08-13
Payer: MEDICARE

## 2020-08-13 PROCEDURE — G0152 HHCP-SERV OF OT,EA 15 MIN: HCPCS

## 2020-08-13 PROCEDURE — 3331090002 HH PPS REVENUE DEBIT

## 2020-08-13 PROCEDURE — 3331090001 HH PPS REVENUE CREDIT

## 2020-08-14 ENCOUNTER — HOME CARE VISIT (OUTPATIENT)
Dept: SCHEDULING | Facility: HOME HEALTH | Age: 75
End: 2020-08-14
Payer: MEDICARE

## 2020-08-14 ENCOUNTER — HOME CARE VISIT (OUTPATIENT)
Dept: HOME HEALTH SERVICES | Facility: HOME HEALTH | Age: 75
End: 2020-08-14
Payer: MEDICARE

## 2020-08-14 VITALS
TEMPERATURE: 98.2 F | DIASTOLIC BLOOD PRESSURE: 68 MMHG | SYSTOLIC BLOOD PRESSURE: 137 MMHG | OXYGEN SATURATION: 98 % | HEART RATE: 81 BPM | RESPIRATION RATE: 18 BRPM

## 2020-08-14 PROCEDURE — 3331090001 HH PPS REVENUE CREDIT

## 2020-08-14 PROCEDURE — 3331090002 HH PPS REVENUE DEBIT

## 2020-08-15 PROCEDURE — 3331090001 HH PPS REVENUE CREDIT

## 2020-08-15 PROCEDURE — 3331090002 HH PPS REVENUE DEBIT

## 2020-08-16 PROCEDURE — 3331090001 HH PPS REVENUE CREDIT

## 2020-08-16 PROCEDURE — 3331090002 HH PPS REVENUE DEBIT

## 2020-08-17 ENCOUNTER — HOME CARE VISIT (OUTPATIENT)
Dept: HOME HEALTH SERVICES | Facility: HOME HEALTH | Age: 75
End: 2020-08-17
Payer: MEDICARE

## 2020-08-17 PROCEDURE — 3331090001 HH PPS REVENUE CREDIT

## 2020-08-17 PROCEDURE — 3331090002 HH PPS REVENUE DEBIT

## 2020-08-18 ENCOUNTER — HOME CARE VISIT (OUTPATIENT)
Dept: SCHEDULING | Facility: HOME HEALTH | Age: 75
End: 2020-08-18
Payer: MEDICARE

## 2020-08-18 PROCEDURE — 3331090002 HH PPS REVENUE DEBIT

## 2020-08-18 PROCEDURE — G0300 HHS/HOSPICE OF LPN EA 15 MIN: HCPCS

## 2020-08-18 PROCEDURE — 3331090001 HH PPS REVENUE CREDIT

## 2020-08-19 ENCOUNTER — HOME CARE VISIT (OUTPATIENT)
Dept: SCHEDULING | Facility: HOME HEALTH | Age: 75
End: 2020-08-19
Payer: MEDICARE

## 2020-08-19 PROCEDURE — G0155 HHCP-SVS OF CSW,EA 15 MIN: HCPCS

## 2020-08-19 PROCEDURE — 3331090001 HH PPS REVENUE CREDIT

## 2020-08-19 PROCEDURE — 3331090002 HH PPS REVENUE DEBIT

## 2020-08-20 PROCEDURE — 3331090002 HH PPS REVENUE DEBIT

## 2020-08-20 PROCEDURE — 3331090001 HH PPS REVENUE CREDIT

## 2020-08-21 PROCEDURE — 3331090002 HH PPS REVENUE DEBIT

## 2020-08-21 PROCEDURE — 3331090001 HH PPS REVENUE CREDIT

## 2020-08-22 PROCEDURE — 3331090001 HH PPS REVENUE CREDIT

## 2020-08-22 PROCEDURE — 3331090002 HH PPS REVENUE DEBIT

## 2020-08-23 PROCEDURE — 3331090001 HH PPS REVENUE CREDIT

## 2020-08-23 PROCEDURE — 3331090002 HH PPS REVENUE DEBIT

## 2020-08-24 PROCEDURE — 3331090002 HH PPS REVENUE DEBIT

## 2020-08-24 PROCEDURE — 3331090001 HH PPS REVENUE CREDIT

## 2020-08-25 ENCOUNTER — HOME CARE VISIT (OUTPATIENT)
Dept: HOME HEALTH SERVICES | Facility: HOME HEALTH | Age: 75
End: 2020-08-25
Payer: MEDICARE

## 2020-08-25 VITALS
TEMPERATURE: 97 F | RESPIRATION RATE: 20 BRPM | DIASTOLIC BLOOD PRESSURE: 74 MMHG | OXYGEN SATURATION: 96 % | HEART RATE: 84 BPM | SYSTOLIC BLOOD PRESSURE: 138 MMHG

## 2020-08-25 PROCEDURE — 3331090001 HH PPS REVENUE CREDIT

## 2020-08-25 PROCEDURE — 3331090002 HH PPS REVENUE DEBIT

## 2020-08-25 NOTE — PROGRESS NOTES
2nd attempt to try and reach patient for SN visit today. No answer and message left, no return call back.

## 2020-08-26 ENCOUNTER — HOME CARE VISIT (OUTPATIENT)
Dept: HOME HEALTH SERVICES | Facility: HOME HEALTH | Age: 75
End: 2020-08-26
Payer: MEDICARE

## 2020-08-26 ENCOUNTER — HOME CARE VISIT (OUTPATIENT)
Dept: SCHEDULING | Facility: HOME HEALTH | Age: 75
End: 2020-08-26
Payer: MEDICARE

## 2020-08-26 PROCEDURE — 3331090001 HH PPS REVENUE CREDIT

## 2020-08-26 PROCEDURE — 3331090002 HH PPS REVENUE DEBIT

## 2020-08-26 NOTE — PROGRESS NOTES
Patient has been non-compliant with home health. Several calls made, messages left, no answer, no call back. PT was unable to eval patient, due to the same issues. Patient has been with home health many times before and this has been an ongoing issue every time, with being unable to reach patient for visits. Case communication with PT, Kan Harman, schedulers and Dr Simin Wong.

## 2020-12-29 ENCOUNTER — HOSPITAL ENCOUNTER (EMERGENCY)
Age: 75
Discharge: HOME | End: 2020-12-29
Attending: EMERGENCY MEDICINE | Admitting: EMERGENCY MEDICINE
Payer: COMMERCIAL

## 2020-12-29 ENCOUNTER — APPOINTMENT (OUTPATIENT)
Dept: GENERAL RADIOLOGY | Age: 75
End: 2020-12-29
Attending: EMERGENCY MEDICINE
Payer: COMMERCIAL

## 2020-12-29 VITALS
BODY MASS INDEX: 30.31 KG/M2 | TEMPERATURE: 97.5 F | SYSTOLIC BLOOD PRESSURE: 130 MMHG | HEART RATE: 87 BPM | WEIGHT: 200 LBS | RESPIRATION RATE: 22 BRPM | HEIGHT: 68 IN | DIASTOLIC BLOOD PRESSURE: 90 MMHG | OXYGEN SATURATION: 97 %

## 2020-12-29 DIAGNOSIS — J44.1 COPD EXACERBATION (HCC): Primary | ICD-10-CM

## 2020-12-29 LAB
ALBUMIN SERPL-MCNC: 3.4 G/DL (ref 3.4–5)
ALBUMIN/GLOB SERPL: 1 {RATIO} (ref 0.8–1.7)
ALP SERPL-CCNC: 112 U/L (ref 45–117)
ALT SERPL-CCNC: 14 U/L (ref 16–61)
ANION GAP SERPL CALC-SCNC: 8 MMOL/L (ref 3–18)
AST SERPL-CCNC: 19 U/L (ref 10–38)
ATRIAL RATE: 87 BPM
BASOPHILS # BLD: 0.3 K/UL (ref 0–0.1)
BASOPHILS NFR BLD: 2 % (ref 0–3)
BILIRUB SERPL-MCNC: 0.4 MG/DL (ref 0.2–1)
BNP SERPL-MCNC: 234 PG/ML (ref 0–1800)
BUN SERPL-MCNC: 16 MG/DL (ref 7–18)
BUN/CREAT SERPL: 13 (ref 12–20)
CALCIUM SERPL-MCNC: 8.9 MG/DL (ref 8.5–10.1)
CALCULATED P AXIS, ECG09: 53 DEGREES
CALCULATED R AXIS, ECG10: 12 DEGREES
CALCULATED T AXIS, ECG11: 63 DEGREES
CHLORIDE SERPL-SCNC: 93 MMOL/L (ref 100–111)
CK MB CFR SERPL CALC: 3.9 % (ref 0–4)
CK MB SERPL-MCNC: 9.9 NG/ML (ref 5–25)
CK SERPL-CCNC: 257 U/L (ref 39–308)
CO2 SERPL-SCNC: 29 MMOL/L (ref 21–32)
CREAT SERPL-MCNC: 1.23 MG/DL (ref 0.6–1.3)
DIAGNOSIS, 93000: NORMAL
DIFFERENTIAL METHOD BLD: ABNORMAL
EOSINOPHIL # BLD: 6 K/UL (ref 0–0.4)
EOSINOPHIL NFR BLD: 40 % (ref 0–5)
ERYTHROCYTE [DISTWIDTH] IN BLOOD BY AUTOMATED COUNT: 12.9 % (ref 11.6–14.5)
GLOBULIN SER CALC-MCNC: 3.5 G/DL (ref 2–4)
GLUCOSE SERPL-MCNC: 91 MG/DL (ref 74–99)
HCT VFR BLD AUTO: 33.4 % (ref 36–48)
HGB BLD-MCNC: 11.1 G/DL (ref 13–16)
LYMPHOCYTES # BLD: 0.8 K/UL (ref 0.8–3.5)
LYMPHOCYTES NFR BLD: 5 % (ref 20–51)
MCH RBC QN AUTO: 28.4 PG (ref 24–34)
MCHC RBC AUTO-ENTMCNC: 33.2 G/DL (ref 31–37)
MCV RBC AUTO: 85.4 FL (ref 74–97)
MONOCYTES # BLD: 0.5 K/UL (ref 0–1)
MONOCYTES NFR BLD: 3 % (ref 2–9)
NEUTS BAND NFR BLD MANUAL: 1 % (ref 0–5)
NEUTS SEG # BLD: 7.4 K/UL (ref 1.8–8)
NEUTS SEG NFR BLD: 49 % (ref 42–75)
P-R INTERVAL, ECG05: 186 MS
PLATELET # BLD AUTO: 451 K/UL (ref 135–420)
PMV BLD AUTO: 8.2 FL (ref 9.2–11.8)
POTASSIUM SERPL-SCNC: 4.2 MMOL/L (ref 3.5–5.5)
PROT SERPL-MCNC: 6.9 G/DL (ref 6.4–8.2)
Q-T INTERVAL, ECG07: 372 MS
QRS DURATION, ECG06: 88 MS
QTC CALCULATION (BEZET), ECG08: 447 MS
RBC # BLD AUTO: 3.91 M/UL (ref 4.7–5.5)
RBC MORPH BLD: ABNORMAL
SODIUM SERPL-SCNC: 130 MMOL/L (ref 136–145)
TROPONIN I SERPL-MCNC: <0.02 NG/ML (ref 0–0.04)
VENTRICULAR RATE, ECG03: 87 BPM
WBC # BLD AUTO: 15.1 K/UL (ref 4.6–13.2)
WBC MORPH BLD: ABNORMAL

## 2020-12-29 PROCEDURE — 74011000250 HC RX REV CODE- 250: Performed by: EMERGENCY MEDICINE

## 2020-12-29 PROCEDURE — 96375 TX/PRO/DX INJ NEW DRUG ADDON: CPT

## 2020-12-29 PROCEDURE — 82550 ASSAY OF CK (CPK): CPT

## 2020-12-29 PROCEDURE — 96374 THER/PROPH/DIAG INJ IV PUSH: CPT

## 2020-12-29 PROCEDURE — 85025 COMPLETE CBC W/AUTO DIFF WBC: CPT

## 2020-12-29 PROCEDURE — 71045 X-RAY EXAM CHEST 1 VIEW: CPT

## 2020-12-29 PROCEDURE — 77030013140 HC MSK NEB VYRM -A

## 2020-12-29 PROCEDURE — 80053 COMPREHEN METABOLIC PANEL: CPT

## 2020-12-29 PROCEDURE — 93005 ELECTROCARDIOGRAM TRACING: CPT

## 2020-12-29 PROCEDURE — 83880 ASSAY OF NATRIURETIC PEPTIDE: CPT

## 2020-12-29 PROCEDURE — 99283 EMERGENCY DEPT VISIT LOW MDM: CPT

## 2020-12-29 PROCEDURE — 74011250636 HC RX REV CODE- 250/636: Performed by: EMERGENCY MEDICINE

## 2020-12-29 RX ORDER — FUROSEMIDE 10 MG/ML
40 INJECTION INTRAMUSCULAR; INTRAVENOUS
Status: COMPLETED | OUTPATIENT
Start: 2020-12-29 | End: 2020-12-29

## 2020-12-29 RX ORDER — PREDNISONE 50 MG/1
50 TABLET ORAL DAILY
Qty: 3 TAB | Refills: 0 | Status: SHIPPED | OUTPATIENT
Start: 2020-12-29 | End: 2021-01-01

## 2020-12-29 RX ORDER — ALBUTEROL SULFATE 0.83 MG/ML
2.5 SOLUTION RESPIRATORY (INHALATION)
Status: COMPLETED | OUTPATIENT
Start: 2020-12-29 | End: 2020-12-29

## 2020-12-29 RX ORDER — ALBUTEROL SULFATE 90 UG/1
2 AEROSOL, METERED RESPIRATORY (INHALATION)
Qty: 1 INHALER | Refills: 0 | Status: ON HOLD | OUTPATIENT
Start: 2020-12-29 | End: 2021-02-05 | Stop reason: SDUPTHER

## 2020-12-29 RX ADMIN — ALBUTEROL SULFATE 2.5 MG: 2.5 SOLUTION RESPIRATORY (INHALATION) at 12:10

## 2020-12-29 RX ADMIN — FUROSEMIDE 40 MG: 10 INJECTION, SOLUTION INTRAMUSCULAR; INTRAVENOUS at 12:10

## 2020-12-29 RX ADMIN — METHYLPREDNISOLONE SODIUM SUCCINATE 125 MG: 125 INJECTION, POWDER, FOR SOLUTION INTRAMUSCULAR; INTRAVENOUS at 12:10

## 2020-12-29 NOTE — ED PROVIDER NOTES
EMERGENCY DEPARTMENT HISTORY AND PHYSICAL EXAM    Date: 12/29/2020  Patient Name: Lyle Ross    History of Presenting Illness     Chief Complaint   Patient presents with    Shortness of Breath    Foot Swelling         History Provided By: Patient    Additional History (Context):   Lyle Ross is a 66 y.o. male with PMHX COPD, CHF presents to the emergency department via private vehicle C/O worsening shortness of breath and lower extremity swelling x2 weeks. Patient reports that he is on a \"water pill\". pt denies fever, abdominal pain, chest pain, nausea, vomiting, and any other sxs or complaints. PCP: Jorge Luis Avendano MD    Current Facility-Administered Medications   Medication Dose Route Frequency Provider Last Rate Last Admin    methylPREDNISolone (PF) (Solu-MEDROL) injection 125 mg  125 mg IntraVENous NOW Sherry Andrade,         furosemide (LASIX) injection 40 mg  40 mg IntraVENous NOW Sherry Andrade,         albuterol (PROVENTIL VENTOLIN) nebulizer solution 2.5 mg  2.5 mg Inhalation NOW Sherry Andrade, DO         Current Outpatient Medications   Medication Sig Dispense Refill    predniSONE (DELTASONE) 50 mg tablet Take 1 Tab by mouth daily for 3 days. 3 Tab 0    albuterol (PROVENTIL HFA, VENTOLIN HFA, PROAIR HFA) 90 mcg/actuation inhaler Take 2 Puffs by inhalation every six (6) hours as needed for Wheezing. 1 Inhaler 0    OXYGEN-AIR DELIVERY SYSTEMS 2 L/min by Nasal route continuous.  arformoteroL (BROVANA) 15 mcg/2 mL nebu neb solution 2 mL by Nebulization route two (2) times a day. 60 Vial 0    budesonide (PULMICORT) 0.5 mg/2 mL nbsp 2 mL by Nebulization route two (2) times a day. 60 Each 0    predniSONE (STERAPRED) 5 mg dose pack See administration instruction per 5mg dose pack 21 Tab 0    furosemide (Lasix) 20 mg tablet Take 20 mg by mouth daily.       dilTIAZem (CARDIZEM) 30 mg tablet Take 1 Tab by mouth Before breakfast, lunch, and dinner. (Patient taking differently: Take 30 mg by mouth daily. Daily) 90 Tab 0    acetaminophen (TYLENOL) 325 mg tablet Take 650 mg by mouth every eight (8) hours as needed for Pain.  dextran 70/hypromellose (ARTIFICIAL TEARS, PF, OP) Apply 2 Drops to eye as needed for Other (both eyes for dryness).  diphenhydrAMINE (BENADRYL) 25 mg capsule Take 25 mg by mouth nightly as needed for Itching.  albuterol (PROVENTIL HFA, VENTOLIN HFA, PROAIR HFA) 90 mcg/actuation inhaler Take 2 Puffs by inhalation every four (4) hours as needed for Wheezing or Shortness of Breath. 1 Inhaler 1    tamsulosin (FLOMAX) 0.4 mg capsule Take 0.4 mg by mouth every evening. Past History     Past Medical History:  Past Medical History:   Diagnosis Date    Brain aneurysm     Cancer ([address]     prostate    COPD (chronic obstructive pulmonary disease) ([address]     Hypertension     Nocturia     Pneumonia     Radiation effect        Past Surgical History:  Past Surgical History:   Procedure Laterality Date    HX HERNIA REPAIR         Family History:  Family History   Problem Relation Age of Onset    Heart Disease Mother        Social History:  Social History     Tobacco Use    Smoking status: Former Smoker     Types: Cigarettes    Smokeless tobacco: Never Used   Substance Use Topics    Alcohol use: No    Drug use: Never       Allergies: Allergies   Allergen Reactions    Aspirin Other (comments)     \"messes my stomach up\"         Review of Systems   Review of Systems   Constitutional: Negative for chills and fever. HENT: Negative for congestion, ear pain, sinus pain and sore throat. Eyes: Negative for pain and visual disturbance. Respiratory: Negative for cough and shortness of breath. Cardiovascular: Negative for chest pain and leg swelling. Gastrointestinal: Negative for abdominal pain, constipation, diarrhea, nausea and vomiting. Genitourinary: Negative for dysuria and hematuria. Musculoskeletal: Positive for joint swelling. Negative for back pain and neck pain. Skin: Negative for pallor and rash. Neurological: Negative for dizziness, tremors, weakness, light-headedness and headaches. All other systems reviewed and are negative. Physical Exam     Vitals:    12/29/20 1024   BP: (!) 130/90   Pulse: 87   Resp: 22   Temp: 97.5 °F (36.4 °C)   SpO2: 97%   Weight: 90.7 kg (200 lb)   Height: 5' 8\" (1.727 m)     Physical Exam    Nursing note and vitals reviewed    Constitutional: Elderly  male, no acute distress  Head: Normocephalic, Atraumatic  Eyes: Pupils are equal, round, and reactive to light, EOMI  Neck: Supple, non-tender  Cardiovascular: Regular rate and rhythm, no murmurs, rubs, or gallops, + 2 radial pulses bilaterally  Chest: Normal work of breathing and chest excursion bilaterally  Lungs: Mild expiratory wheezes with no rhonchi or crackles  Abdomen: Soft, non tender, non distended, normoactive bowel sounds  Back: No evidence of trauma or deformity  Extremities: No evidence of trauma or deformity, +3 pitting edema of his shoulder ankles extending to his feet bilaterally.  No streaking erythema, vesicular lesions, ulcerations or bulla  Skin: Warm and dry, normal cap refill  Neuro: Alert and appropriate, CN intact, normal speech, moving all 4 extremities freely and symmetrically  Psychiatric: Normal mood and affect       Diagnostic Study Results     Labs -     Recent Results (from the past 12 hour(s))   CBC WITH AUTOMATED DIFF    Collection Time: 12/29/20 10:53 AM   Result Value Ref Range    WBC 15.1 (H) 4.6 - 13.2 K/uL    RBC 3.91 (L) 4.70 - 5.50 M/uL    HGB 11.1 (L) 13.0 - 16.0 g/dL    HCT 33.4 (L) 36.0 - 48.0 %    MCV 85.4 74.0 - 97.0 FL    MCH 28.4 24.0 - 34.0 PG    MCHC 33.2 31.0 - 37.0 g/dL    RDW 12.9 11.6 - 14.5 %    PLATELET 633 (H) 679 - 420 K/uL    MPV 8.2 (L) 9.2 - 11.8 FL    NEUTROPHILS PENDING %    LYMPHOCYTES PENDING %    MONOCYTES PENDING %    EOSINOPHILS PENDING %    BASOPHILS PENDING %    ABS. NEUTROPHILS PENDING K/UL    ABS. LYMPHOCYTES PENDING K/UL    ABS. MONOCYTES PENDING K/UL    ABS. EOSINOPHILS PENDING K/UL    ABS. BASOPHILS PENDING K/UL    DF PENDING    METABOLIC PANEL, COMPREHENSIVE    Collection Time: 12/29/20 10:53 AM   Result Value Ref Range    Sodium 130 (L) 136 - 145 mmol/L    Potassium 4.2 3.5 - 5.5 mmol/L    Chloride 93 (L) 100 - 111 mmol/L    CO2 29 21 - 32 mmol/L    Anion gap 8 3.0 - 18 mmol/L    Glucose 91 74 - 99 mg/dL    BUN 16 7.0 - 18 MG/DL    Creatinine 1.23 0.6 - 1.3 MG/DL    BUN/Creatinine ratio 13 12 - 20      GFR est AA >60 >60 ml/min/1.73m2    GFR est non-AA 57 (L) >60 ml/min/1.73m2    Calcium 8.9 8.5 - 10.1 MG/DL    Bilirubin, total 0.4 0.2 - 1.0 MG/DL    ALT (SGPT) 14 (L) 16 - 61 U/L    AST (SGOT) 19 10 - 38 U/L    Alk. phosphatase 112 45 - 117 U/L    Protein, total 6.9 6.4 - 8.2 g/dL    Albumin 3.4 3.4 - 5.0 g/dL    Globulin 3.5 2.0 - 4.0 g/dL    A-G Ratio 1.0 0.8 - 1.7     NT-PRO BNP    Collection Time: 12/29/20 10:53 AM   Result Value Ref Range    NT pro- 0 - 1,800 PG/ML   CARDIAC PANEL,(CK, CKMB & TROPONIN)    Collection Time: 12/29/20 10:53 AM   Result Value Ref Range    CK - MB 9.9 (H) <3.6 ng/ml    CK-MB Index 3.9 0.0 - 4.0 %     39 - 308 U/L    Troponin-I, QT <0.02 0.0 - 0.045 NG/ML   EKG, 12 LEAD, INITIAL    Collection Time: 12/29/20 10:56 AM   Result Value Ref Range    Ventricular Rate 87 BPM    Atrial Rate 87 BPM    P-R Interval 186 ms    QRS Duration 88 ms    Q-T Interval 372 ms    QTC Calculation (Bezet) 447 ms    Calculated P Axis 53 degrees    Calculated R Axis 12 degrees    Calculated T Axis 63 degrees    Diagnosis       Sinus rhythm with occasional premature ventricular complexes  Otherwise normal ECG  When compared with ECG of 04-AUG-2020 22:04,  No significant change was found         Radiologic Studies -   XR CHEST PORT   Final Result   IMPRESSION:      No acute radiographic cardiopulmonary abnormality. CT Results  (Last 48 hours)    None        CXR Results  (Last 48 hours)               12/29/20 1142  XR CHEST PORT Final result    Impression:  IMPRESSION:       No acute radiographic cardiopulmonary abnormality. Narrative:  EXAM: XR CHEST PORT       CLINICAL INDICATION/HISTORY: SOB   -Additional: None       COMPARISON: Several prior studies, most recently 8/6/2020       TECHNIQUE: Frontal view of the chest       _______________       FINDINGS:       HEART AND MEDIASTINUM: Normal cardiac size and mediastinal contours. LUNGS AND PLEURAL SPACES: Redemonstration of right-sided pleural-based plaque. No superimposed alveolar consolidation, pneumothorax, or definite pleural   effusion. BONY THORAX AND SOFT TISSUES: No acute osseous abnormality       _______________                   Medical Decision Making   I am the first provider for this patient. I reviewed the vital signs, available nursing notes, past medical history, past surgical history, family history and social history. Vital Signs-Reviewed the patient's vital signs. Pulse Oximetry Analysis -97% on room air    Cardiac Monitor:  Rate: 87 bpm  Rhythm: Regular    EKG interpretation: (Preliminary)  11:12 AM  Sinus rhythm at 87 bpm.  NC interval 186 ms. QRS 88 ms. QTc 447 ms. No acute ST elevation  Records Reviewed: Nursing Notes and Old Medical Records    Provider Notes:   66 y.o. male who presents with lower extremity swelling and shortness of breath x3 weeks. On exam patient does not appear toxic or acutely ill. He is afebrile with appropriate vital signs. He saturating 97% on room air. However noted to have +3 edema present extremities, with. Suspect component of COPD and CHF exacerbation. Will obtain chest x-ray and BNP. Very low suspicion for ACS as patient has no chest pain. Will give Solu-Medrol and a dose of Lasix. Procedures:  Procedures    ED Course:   10:38 AM   Initial assessment performed.  The patients presenting problems have been discussed, and they are in agreement with the care plan formulated and outlined with them. I have encouraged them to ask questions as they arise throughout their visit. 11:57 AM  Patient BNP within normal limits. Chest x-ray showing no acute process. Mild leukocytosis and lab work however signs of infection on chest x-ray. Troponin is negative. Patient continues to be saturating well on room air. No indication for admission for COPD exacerbation. Will discharge with course of steroids and inhaler for COPD exacerbation. Diagnosis and Disposition       DISCHARGE NOTE:  11:57 AM    [address] [place] [address]  results have been reviewed with him. He has been counseled regarding his diagnosis, treatment, and plan. He verbally conveys understanding and agreement of the signs, symptoms, diagnosis, treatment and prognosis and additionally agrees to follow up as discussed. He also agrees with the care-plan and conveys that all of his questions have been answered. I have also provided discharge instructions for him that include: educational information regarding their diagnosis and treatment, and list of reasons why they would want to return to the ED prior to their follow-up appointment, should his condition change. He has been provided with education for proper emergency department utilization. CLINICAL IMPRESSION:    1. COPD exacerbation ([facility]        PLAN:  1. D/C Home  2. Current Discharge Medication List      START taking these medications    Details   predniSONE (DELTASONE) 50 mg tablet Take 1 Tab by mouth daily for 3 days. Qty: 3 Tab, Refills: 0      !! albuterol (PROVENTIL HFA, VENTOLIN HFA, PROAIR HFA) 90 mcg/actuation inhaler Take 2 Puffs by inhalation every six (6) hours as needed for Wheezing. Qty: 1 Inhaler, Refills: 0       !! - Potential duplicate medications found. Please discuss with provider.       CONTINUE these medications which have NOT CHANGED    Details predniSONE (STERAPRED) 5 mg dose pack See administration instruction per 5mg dose pack  Qty: 21 Tab, Refills: 0      !! albuterol (PROVENTIL HFA, VENTOLIN HFA, PROAIR HFA) 90 mcg/actuation inhaler Take 2 Puffs by inhalation every four (4) hours as needed for Wheezing or Shortness of Breath. Qty: 1 Inhaler, Refills: 1       !! - Potential duplicate medications found. Please discuss with provider. 3.   Follow-up Information     Follow up With Specialties Details Why Contact Info    Alize Downey MD Family Medicine Schedule an appointment as soon as possible for a visit in 2 days  [address] [facility] [address] [facility] [address]  [phone]      [facility] EMERGENCY DEPT Emergency Medicine  As needed if symptoms worsen [address] [address]  [phone]        ____________________________________     Please note that this dictation was completed with [employer], the computer voice recognition software. Quite often unanticipated grammatical, syntax, homophones, and other interpretive errors are inadvertently transcribed by the computer software. Please disregard these errors. Please excuse any errors that have escaped final proofreading.

## 2020-12-29 NOTE — ED TRIAGE NOTES
Pt arrives to ED with c\o SOB, pt sts sob has worsened over past few weeks, pt also endorses BLE swelling

## 2021-02-02 ENCOUNTER — HOSPITAL ENCOUNTER (INPATIENT)
Age: 76
LOS: 5 days | Discharge: HOME | DRG: 190 | End: 2021-02-07
Attending: EMERGENCY MEDICINE | Admitting: HOSPITALIST
Payer: MEDICARE

## 2021-02-02 ENCOUNTER — APPOINTMENT (OUTPATIENT)
Dept: GENERAL RADIOLOGY | Age: 76
DRG: 190 | End: 2021-02-02
Attending: EMERGENCY MEDICINE
Payer: MEDICARE

## 2021-02-02 DIAGNOSIS — I48.91 ATRIAL FIBRILLATION WITH RVR (HCC): ICD-10-CM

## 2021-02-02 DIAGNOSIS — J44.1 COPD WITH ACUTE EXACERBATION (HCC): Primary | ICD-10-CM

## 2021-02-02 DIAGNOSIS — R06.03 RESPIRATORY DISTRESS: ICD-10-CM

## 2021-02-02 LAB
ALBUMIN SERPL-MCNC: 3.1 G/DL (ref 3.4–5)
ALBUMIN/GLOB SERPL: 1 {RATIO} (ref 0.8–1.7)
ALP SERPL-CCNC: 101 U/L (ref 45–117)
ALT SERPL-CCNC: 15 U/L (ref 16–61)
ANION GAP SERPL CALC-SCNC: 7 MMOL/L (ref 3–18)
ARTERIAL PATENCY WRIST A: YES
AST SERPL-CCNC: 9 U/L (ref 10–38)
ATRIAL RATE: 103 BPM
ATRIAL RATE: 122 BPM
ATRIAL RATE: 138 BPM
BASE EXCESS BLD CALC-SCNC: 0 MMOL/L
BASOPHILS # BLD: 0.1 K/UL (ref 0–0.1)
BASOPHILS NFR BLD: 1 % (ref 0–2)
BDY SITE: ABNORMAL
BILIRUB SERPL-MCNC: 0.4 MG/DL (ref 0.2–1)
BNP SERPL-MCNC: 201 PG/ML (ref 0–1800)
BODY TEMPERATURE: 98.6
BUN SERPL-MCNC: 16 MG/DL (ref 7–18)
BUN/CREAT SERPL: 14 (ref 12–20)
CALCIUM SERPL-MCNC: 8.3 MG/DL (ref 8.5–10.1)
CALCULATED P AXIS, ECG09: 53 DEGREES
CALCULATED R AXIS, ECG10: 16 DEGREES
CALCULATED R AXIS, ECG10: 30 DEGREES
CALCULATED R AXIS, ECG10: 9 DEGREES
CALCULATED T AXIS, ECG11: 32 DEGREES
CALCULATED T AXIS, ECG11: 52 DEGREES
CALCULATED T AXIS, ECG11: 57 DEGREES
CHLORIDE SERPL-SCNC: 97 MMOL/L (ref 100–111)
CK MB CFR SERPL CALC: 5.1 % (ref 0–4)
CK MB SERPL-MCNC: 6.5 NG/ML (ref 5–25)
CK SERPL-CCNC: 127 U/L (ref 39–308)
CO2 SERPL-SCNC: 27 MMOL/L (ref 21–32)
COVID-19 RAPID TEST, COVR: NOT DETECTED
CREAT SERPL-MCNC: 1.17 MG/DL (ref 0.6–1.3)
DIAGNOSIS, 93000: NORMAL
DIFFERENTIAL METHOD BLD: ABNORMAL
EOSINOPHIL # BLD: 1 K/UL (ref 0–0.4)
EOSINOPHIL NFR BLD: 10 % (ref 0–5)
ERYTHROCYTE [DISTWIDTH] IN BLOOD BY AUTOMATED COUNT: 13.4 % (ref 11.6–14.5)
GAS FLOW.O2 O2 DELIVERY SYS: ABNORMAL L/MIN
GLOBULIN SER CALC-MCNC: 3.2 G/DL (ref 2–4)
GLUCOSE BLD STRIP.AUTO-MCNC: 138 MG/DL (ref 70–110)
GLUCOSE BLD STRIP.AUTO-MCNC: 167 MG/DL (ref 70–110)
GLUCOSE BLD STRIP.AUTO-MCNC: 207 MG/DL (ref 70–110)
GLUCOSE SERPL-MCNC: 123 MG/DL (ref 74–99)
HCO3 BLD-SCNC: 25.3 MMOL/L (ref 22–26)
HCT VFR BLD AUTO: 31.8 % (ref 36–48)
HGB BLD-MCNC: 10.4 G/DL (ref 13–16)
LYMPHOCYTES # BLD: 0.6 K/UL (ref 0.9–3.6)
LYMPHOCYTES NFR BLD: 6 % (ref 21–52)
MCH RBC QN AUTO: 28.4 PG (ref 24–34)
MCHC RBC AUTO-ENTMCNC: 32.7 G/DL (ref 31–37)
MCV RBC AUTO: 86.9 FL (ref 74–97)
MONOCYTES # BLD: 0.8 K/UL (ref 0.05–1.2)
MONOCYTES NFR BLD: 8 % (ref 3–10)
NEUTS SEG # BLD: 7.9 K/UL (ref 1.8–8)
NEUTS SEG NFR BLD: 75 % (ref 40–73)
O2/TOTAL GAS SETTING VFR VENT: 0.3 %
P-R INTERVAL, ECG05: 162 MS
PCO2 BLD: 41.5 MMHG (ref 35–45)
PEEP RESPIRATORY: 6 CMH2O
PH BLD: 7.39 [PH] (ref 7.35–7.45)
PIP ISTAT,IPIP: 16
PLATELET # BLD AUTO: 357 K/UL (ref 135–420)
PMV BLD AUTO: 8.4 FL (ref 9.2–11.8)
PO2 BLD: 125 MMHG (ref 80–100)
POTASSIUM SERPL-SCNC: 4 MMOL/L (ref 3.5–5.5)
PRESSURE SUPPORT SETTING VENT: 10 CMH2O
PROT SERPL-MCNC: 6.3 G/DL (ref 6.4–8.2)
Q-T INTERVAL, ECG07: 320 MS
Q-T INTERVAL, ECG07: 352 MS
Q-T INTERVAL, ECG07: 354 MS
QRS DURATION, ECG06: 82 MS
QRS DURATION, ECG06: 82 MS
QRS DURATION, ECG06: 88 MS
QTC CALCULATION (BEZET), ECG08: 451 MS
QTC CALCULATION (BEZET), ECG08: 461 MS
QTC CALCULATION (BEZET), ECG08: 484 MS
RBC # BLD AUTO: 3.66 M/UL (ref 4.7–5.5)
SAO2 % BLD: 99 % (ref 92–97)
SARS-COV-2, COV2: NORMAL
SERVICE CMNT-IMP: ABNORMAL
SODIUM SERPL-SCNC: 131 MMOL/L (ref 136–145)
SOURCE, COVRS: NORMAL
SPECIMEN TYPE: ABNORMAL
SPONTANEOUS TIMED, IST: YES
TOTAL RESP. RATE, ITRR: 30
TROPONIN I SERPL-MCNC: <0.02 NG/ML (ref 0–0.04)
VENTRICULAR RATE, ECG03: 103 BPM
VENTRICULAR RATE, ECG03: 138 BPM
VENTRICULAR RATE, ECG03: 98 BPM
WBC # BLD AUTO: 10.4 K/UL (ref 4.6–13.2)

## 2021-02-02 PROCEDURE — 71045 X-RAY EXAM CHEST 1 VIEW: CPT

## 2021-02-02 PROCEDURE — 94640 AIRWAY INHALATION TREATMENT: CPT

## 2021-02-02 PROCEDURE — 94660 CPAP INITIATION&MGMT: CPT

## 2021-02-02 PROCEDURE — 82962 GLUCOSE BLOOD TEST: CPT

## 2021-02-02 PROCEDURE — 74011636637 HC RX REV CODE- 636/637: Performed by: HOSPITALIST

## 2021-02-02 PROCEDURE — 74011250636 HC RX REV CODE- 250/636: Performed by: HOSPITALIST

## 2021-02-02 PROCEDURE — 96365 THER/PROPH/DIAG IV INF INIT: CPT

## 2021-02-02 PROCEDURE — 93005 ELECTROCARDIOGRAM TRACING: CPT

## 2021-02-02 PROCEDURE — 77010033678 HC OXYGEN DAILY

## 2021-02-02 PROCEDURE — 74011250637 HC RX REV CODE- 250/637: Performed by: EMERGENCY MEDICINE

## 2021-02-02 PROCEDURE — 74011250637 HC RX REV CODE- 250/637: Performed by: HOSPITALIST

## 2021-02-02 PROCEDURE — 85025 COMPLETE CBC W/AUTO DIFF WBC: CPT

## 2021-02-02 PROCEDURE — 80053 COMPREHEN METABOLIC PANEL: CPT

## 2021-02-02 PROCEDURE — 96376 TX/PRO/DX INJ SAME DRUG ADON: CPT

## 2021-02-02 PROCEDURE — 94762 N-INVAS EAR/PLS OXIMTRY CONT: CPT

## 2021-02-02 PROCEDURE — 83880 ASSAY OF NATRIURETIC PEPTIDE: CPT

## 2021-02-02 PROCEDURE — 82803 BLOOD GASES ANY COMBINATION: CPT

## 2021-02-02 PROCEDURE — 99285 EMERGENCY DEPT VISIT HI MDM: CPT

## 2021-02-02 PROCEDURE — 74011000250 HC RX REV CODE- 250: Performed by: EMERGENCY MEDICINE

## 2021-02-02 PROCEDURE — 65660000000 HC RM CCU STEPDOWN

## 2021-02-02 PROCEDURE — 74011000250 HC RX REV CODE- 250: Performed by: HOSPITALIST

## 2021-02-02 PROCEDURE — 82553 CREATINE MB FRACTION: CPT

## 2021-02-02 PROCEDURE — 96375 TX/PRO/DX INJ NEW DRUG ADDON: CPT

## 2021-02-02 PROCEDURE — 74011250636 HC RX REV CODE- 250/636: Performed by: EMERGENCY MEDICINE

## 2021-02-02 PROCEDURE — 36600 WITHDRAWAL OF ARTERIAL BLOOD: CPT

## 2021-02-02 PROCEDURE — 94760 N-INVAS EAR/PLS OXIMETRY 1: CPT

## 2021-02-02 PROCEDURE — 87635 SARS-COV-2 COVID-19 AMP PRB: CPT

## 2021-02-02 RX ORDER — IPRATROPIUM BROMIDE AND ALBUTEROL SULFATE 2.5; .5 MG/3ML; MG/3ML
3 SOLUTION RESPIRATORY (INHALATION)
Status: DISCONTINUED | OUTPATIENT
Start: 2021-02-02 | End: 2021-02-07 | Stop reason: HOSPADM

## 2021-02-02 RX ORDER — FUROSEMIDE 20 MG/1
20 TABLET ORAL DAILY
Status: DISCONTINUED | OUTPATIENT
Start: 2021-02-03 | End: 2021-02-07 | Stop reason: HOSPADM

## 2021-02-02 RX ORDER — IPRATROPIUM BROMIDE AND ALBUTEROL SULFATE 2.5; .5 MG/3ML; MG/3ML
3 SOLUTION RESPIRATORY (INHALATION)
Status: COMPLETED | OUTPATIENT
Start: 2021-02-02 | End: 2021-02-02

## 2021-02-02 RX ORDER — DEXTROSE MONOHYDRATE 100 MG/ML
125-250 INJECTION, SOLUTION INTRAVENOUS AS NEEDED
Status: DISCONTINUED | OUTPATIENT
Start: 2021-02-02 | End: 2021-02-07 | Stop reason: HOSPADM

## 2021-02-02 RX ORDER — MAGNESIUM SULFATE HEPTAHYDRATE 40 MG/ML
2 INJECTION, SOLUTION INTRAVENOUS ONCE
Status: COMPLETED | OUTPATIENT
Start: 2021-02-02 | End: 2021-02-02

## 2021-02-02 RX ORDER — BUDESONIDE 0.5 MG/2ML
500 INHALANT ORAL 2 TIMES DAILY
Status: DISCONTINUED | OUTPATIENT
Start: 2021-02-02 | End: 2021-02-02

## 2021-02-02 RX ORDER — DILTIAZEM HYDROCHLORIDE 5 MG/ML
0.25 INJECTION INTRAVENOUS
Status: COMPLETED | OUTPATIENT
Start: 2021-02-02 | End: 2021-02-02

## 2021-02-02 RX ORDER — DILTIAZEM HYDROCHLORIDE 30 MG/1
60 TABLET, FILM COATED ORAL
Status: COMPLETED | OUTPATIENT
Start: 2021-02-02 | End: 2021-02-02

## 2021-02-02 RX ORDER — BUDESONIDE 0.5 MG/2ML
500 INHALANT ORAL
Status: DISCONTINUED | OUTPATIENT
Start: 2021-02-02 | End: 2021-02-07 | Stop reason: HOSPADM

## 2021-02-02 RX ORDER — ARFORMOTEROL TARTRATE 15 UG/2ML
15 SOLUTION RESPIRATORY (INHALATION) 2 TIMES DAILY
Status: DISCONTINUED | OUTPATIENT
Start: 2021-02-02 | End: 2021-02-02

## 2021-02-02 RX ORDER — MAGNESIUM SULFATE 100 %
16 CRYSTALS MISCELLANEOUS AS NEEDED
Status: DISCONTINUED | OUTPATIENT
Start: 2021-02-02 | End: 2021-02-07 | Stop reason: HOSPADM

## 2021-02-02 RX ORDER — ARFORMOTEROL TARTRATE 15 UG/2ML
15 SOLUTION RESPIRATORY (INHALATION)
Status: DISCONTINUED | OUTPATIENT
Start: 2021-02-02 | End: 2021-02-07 | Stop reason: HOSPADM

## 2021-02-02 RX ORDER — ENOXAPARIN SODIUM 100 MG/ML
40 INJECTION SUBCUTANEOUS EVERY 24 HOURS
Status: DISCONTINUED | OUTPATIENT
Start: 2021-02-02 | End: 2021-02-07 | Stop reason: HOSPADM

## 2021-02-02 RX ORDER — INSULIN LISPRO 100 [IU]/ML
INJECTION, SOLUTION INTRAVENOUS; SUBCUTANEOUS
Status: DISCONTINUED | OUTPATIENT
Start: 2021-02-02 | End: 2021-02-07 | Stop reason: HOSPADM

## 2021-02-02 RX ORDER — TAMSULOSIN HYDROCHLORIDE 0.4 MG/1
0.4 CAPSULE ORAL EVERY EVENING
Status: DISCONTINUED | OUTPATIENT
Start: 2021-02-02 | End: 2021-02-07 | Stop reason: HOSPADM

## 2021-02-02 RX ORDER — DILTIAZEM HYDROCHLORIDE 5 MG/ML
25 INJECTION INTRAVENOUS
Status: COMPLETED | OUTPATIENT
Start: 2021-02-02 | End: 2021-02-02

## 2021-02-02 RX ADMIN — TAMSULOSIN HYDROCHLORIDE 0.4 MG: 0.4 CAPSULE ORAL at 17:42

## 2021-02-02 RX ADMIN — BUDESONIDE 500 MCG: 0.5 INHALANT RESPIRATORY (INHALATION) at 23:27

## 2021-02-02 RX ADMIN — DILTIAZEM HYDROCHLORIDE 25 MG: 5 INJECTION INTRAVENOUS at 07:51

## 2021-02-02 RX ADMIN — ARFORMOTEROL TARTRATE 15 MCG: 15 SOLUTION RESPIRATORY (INHALATION) at 23:27

## 2021-02-02 RX ADMIN — IPRATROPIUM BROMIDE AND ALBUTEROL SULFATE 3 ML: .5; 3 SOLUTION RESPIRATORY (INHALATION) at 05:59

## 2021-02-02 RX ADMIN — INSULIN LISPRO 4 UNITS: 100 INJECTION, SOLUTION INTRAVENOUS; SUBCUTANEOUS at 18:00

## 2021-02-02 RX ADMIN — IPRATROPIUM BROMIDE AND ALBUTEROL SULFATE 3 ML: .5; 3 SOLUTION RESPIRATORY (INHALATION) at 23:28

## 2021-02-02 RX ADMIN — IPRATROPIUM BROMIDE AND ALBUTEROL SULFATE 3 ML: .5; 3 SOLUTION RESPIRATORY (INHALATION) at 14:20

## 2021-02-02 RX ADMIN — MAGNESIUM SULFATE HEPTAHYDRATE 2 G: 40 INJECTION, SOLUTION INTRAVENOUS at 05:29

## 2021-02-02 RX ADMIN — IPRATROPIUM BROMIDE AND ALBUTEROL SULFATE 3 ML: .5; 3 SOLUTION RESPIRATORY (INHALATION) at 06:00

## 2021-02-02 RX ADMIN — METHYLPREDNISOLONE SODIUM SUCCINATE 40 MG: 40 INJECTION, POWDER, FOR SOLUTION INTRAMUSCULAR; INTRAVENOUS at 22:59

## 2021-02-02 RX ADMIN — METHYLPREDNISOLONE SODIUM SUCCINATE 40 MG: 40 INJECTION, POWDER, FOR SOLUTION INTRAMUSCULAR; INTRAVENOUS at 14:44

## 2021-02-02 RX ADMIN — DILTIAZEM HYDROCHLORIDE 25 MG: 5 INJECTION INTRAVENOUS at 07:18

## 2021-02-02 RX ADMIN — METHYLPREDNISOLONE SODIUM SUCCINATE 125 MG: 125 INJECTION, POWDER, FOR SOLUTION INTRAMUSCULAR; INTRAVENOUS at 05:29

## 2021-02-02 RX ADMIN — ENOXAPARIN SODIUM 40 MG: 40 INJECTION SUBCUTANEOUS at 13:11

## 2021-02-02 RX ADMIN — DILTIAZEM HYDROCHLORIDE 60 MG: 30 TABLET, FILM COATED ORAL at 08:24

## 2021-02-02 RX ADMIN — IPRATROPIUM BROMIDE AND ALBUTEROL SULFATE 3 ML: .5; 3 SOLUTION RESPIRATORY (INHALATION) at 20:20

## 2021-02-02 RX ADMIN — INSULIN LISPRO 2 UNITS: 100 INJECTION, SOLUTION INTRAVENOUS; SUBCUTANEOUS at 13:11

## 2021-02-02 NOTE — ED NOTES
Pt sheets changed, new gown provided and oral fluids given. Clarified Cardizem with provider after new BP obtained. Per provider, clear for Cardizem. Pt denies any other needs or complaints.

## 2021-02-02 NOTE — ED NOTES
Verbal shift change report given to Amrit Hickman RN (oncoming nurse) by Elham Escobar RN (offgoing nurse). Report included the following information SBAR, Kardex, ED Summary, [facility] and Recent Results.

## 2021-02-02 NOTE — H&P
History & Physical    Patient: Suzie Purcell MRN: [id number]  CSN: [id number]    YOB: 1945  Age: 66 y.o. Sex: male      DOA: 2/2/2021  Primary Care Provider:  Dora Bone MD      Assessment/Plan     Patient Active Problem List   Diagnosis Code    Hypertension I10    COPD (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 ([facility]) J44.9    Chronic renal disease, stage 3, moderately decreased glomerular filtration rate between 30-59 mL/min/1.73 square meter N18.30    Asbestosis ([facility] J61    Acute exacerbation of chronic obstructive pulmonary disease (COPD) ([facility] J44.1    Debility R53.81    Paroxysmal atrial fibrillation ([facility]) I48.0    Chronic respiratory failure with hypoxia ([facility]) J96.11    Tobacco dependence F17.200    Hyponatremia E87.1    Pleural effusion, right J90    COPD with acute exacerbation ([facility] [address] J44.1    Acute respiratory failure with hypoxia and hypercarbia ([facility]) J96.01, J96.02    Hyponatremia E87.1    Advanced care planning/counseling discussion Z71.89    Hypokalemia E87.6    COPD (chronic obstructive pulmonary disease) ([facility]) J44.9    CAP (community acquired pneumonia) J18.9    Respiratory distress R06.03    COPD exacerbation ([facility]) J44.1    Atrial fibrillation with RVR ([facility]) I48.91     Admit to telemetry    Acute on Chronic respiratory failure - continue with home oxygen at 2L NC     COPD exacerbation - start on Intravenous steroids. Inhaled short acting beta 2 agonist and anticholinergics. Empiric antibiotics.     PAF -   NSR     HTN -   controlled, monitor BP    CKD -   Stage 2, monitor renal function. Hyponatremia -  Follow sodium levels     DVT prophylaxis with lovenox        Estimated length of stay : 2-3 days        CC: SOB       HPI:     Suzie Purcell is a 66 y.o. male with chronic respiratory failure, COPD, PAF, HTN, presents to ER with concerns of SOB for the past several days. It has been getting progressively getting worse.  He has been using neb treatment with no help. associated with productive cough, He denies any fever/chills, chest pain, N/V.  EMS was called and found patient in tripod position, with difficulty breathing and not able to complete sentences. He was placed on CPAP with improvement in symptoms. In ER he received solumedrol and neb treatment and he was still wheezing. Past Medical History:   Diagnosis Date    Brain aneurysm     Cancer [facility])     prostate    COPD (chronic obstructive pulmonary disease) (HCC)     Hypertension     Nocturia     Pneumonia     Radiation effect        Past Surgical History:   Procedure Laterality Date    HX HERNIA REPAIR         Family History   Problem Relation Age of Onset    Heart Disease Mother        Social History     Socioeconomic History    Marital status:      Spouse name: Not on file    Number of children: Not on file    Years of education: Not on file    Highest education level: Not on file   Tobacco Use    Smoking status: Former Smoker     Types: Cigarettes    Smokeless tobacco: Never Used   Substance and Sexual Activity    Alcohol use: No    Drug use: Never       Prior to Admission medications    Medication Sig Start Date End Date Taking? Authorizing Provider   albuterol (PROVENTIL HFA, VENTOLIN HFA, PROAIR HFA) 90 mcg/actuation inhaler Take 2 Puffs by inhalation every six (6) hours as needed for Wheezing. 12/29/20  Yes Susana Andrade, DO   arformoteroL (BROVANA) 15 mcg/2 mL nebu neb solution 2 mL by Nebulization route two (2) times a day. 8/10/20  Yes Tanna Teague MD   budesonide (PULMICORT) 0.5 mg/2 mL nbsp 2 mL by Nebulization route two (2) times a day. 8/10/20  Yes Tanna Teague MD   furosemide (Lasix) 20 mg tablet Take 20 mg by mouth daily. Yes Provider, Historical   dilTIAZem (CARDIZEM) 30 mg tablet Take 1 Tab by mouth Before breakfast, lunch, and dinner. Patient taking differently: Take 30 mg by mouth daily.  Daily 4/14/20  Yes Tanna Teague MD dextran 70/hypromellose (ARTIFICIAL TEARS, PF, OP) Apply 2 Drops to eye as needed for Other (both eyes for dryness). Yes Provider, Historical   diphenhydrAMINE (BENADRYL) 25 mg capsule Take 25 mg by mouth nightly as needed for Itching. Yes Provider, Historical   albuterol (PROVENTIL HFA, VENTOLIN HFA, PROAIR HFA) 90 mcg/actuation inhaler Take 2 Puffs by inhalation every four (4) hours as needed for Wheezing or Shortness of Breath. 4/23/18  Yes Con Hutson PA-C   tamsulosin (FLOMAX) 0.4 mg capsule Take 0.4 mg by mouth every evening. Yes Other, MD Balyne   OXYGEN-AIR DELIVERY SYSTEMS 2 L/min by Nasal route continuous. Provider, Historical   predniSONE (STERAPRED) 5 mg dose pack See administration instruction per 5mg dose pack 8/10/20   Hao Herron MD   acetaminophen (TYLENOL) 325 mg tablet Take 650 mg by mouth every eight (8) hours as needed for Pain. Provider, Historical       Allergies   Allergen Reactions    Aspirin Other (comments)     \"messes my stomach up\"       Review of Systems  Gen: No fever, chills, malaise, weight loss/gain. Heent: No headache, rhinorrhea, epistaxis, ear pain, hearing loss, sinus pain, neck pain/stiffness, sore throat. Heart: No chest pain, palpitations, KUMAR, pnd, or orthopnea. Resp: see above. GI: No nausea, vomiting, diarrhea, constipation, melena or hematochezia. : No urinary obstruction, dysuria or hematuria. Derm: No rash, new skin lesion or pruritis. Musc/skeletal: no bone or joint complains. Vasc: No edema, cyanosis or claudication. Endo: No heat/cold intolerance, no polyuria,polydipsia or polyphagia. Neuro: No unilateral weakness, numbness, tingling. No seizures. Heme: No easy bruising or bleeding.           Physical Exam:     Physical Exam:  Visit Vitals  /63 (BP 1 Location: Right upper arm, BP Patient Position: Sitting)   Pulse 83   Temp 97.7 °F (36.5 °C)   Resp 20   Ht 5' 8\" (1.727 m)   Wt 100.2 kg (221 lb)   SpO2 98%   BMI 33.60 kg/m²    O2 Flow Rate (L/min): 2 l/min O2 Device: Nasal cannula    Temp (24hrs), Av.6 °F (36.4 °C), Min:97 °F (36.1 °C), Max:98 °F (36.7 °C)    701 - 1900  In: 200 [P.O.:200]  Out: 100 [Urine:100]   1901 - 700  In: 50 [I.V.:50]  Out: -     General:  Awake, cooperative, no distress. Head:  Normocephalic, without obvious abnormality, atraumatic. Eyes:  Conjunctivae/corneas clear, sclera anicteric, PERRL, EOMs intact. Nose: Nares normal. No drainage or sinus tenderness. Throat: Lips, mucosa, and tongue normal.    Neck: Supple, symmetrical, trachea midline, no adenopathy. Lungs:   Exp wheezes bilaterally. Heart:   S1, S2, no murmur, click, rub or gallop. Abdomen: Soft, non-tender. Bowel sounds normal. No masses,  No organomegaly. Extremities: Extremities normal, atraumatic, no cyanosis or edema. Capillary refill normal.   Pulses: 2+ and symmetric all extremities. Skin: Skin color pink, turgor normal. No rashes or lesions   Neurologic: CNII-XII intact. No focal motor or sensory deficit.        Labs Reviewed:    CMP:   Lab Results   Component Value Date/Time     (L) 2021 05:24 AM    K 4.0 2021 05:24 AM    CL 97 (L) 2021 05:24 AM    CO2 27 2021 05:24 AM    AGAP 7 2021 05:24 AM     (H) 2021 05:24 AM    BUN 16 2021 05:24 AM    CREA 1.17 2021 05:24 AM    GFRAA >60 2021 05:24 AM    GFRNA >60 2021 05:24 AM    CA 8.3 (L) 2021 05:24 AM    ALB 3.1 (L) 2021 05:24 AM    TP 6.3 (L) 2021 05:24 AM    GLOB 3.2 2021 05:24 AM    AGRAT 1.0 2021 05:24 AM    ALT 15 (L) 2021 05:24 AM     CBC:   Lab Results   Component Value Date/Time    WBC 10.4 2021 05:24 AM    HGB 10.4 (L) 2021 05:24 AM    HCT 31.8 (L) 2021 05:24 AM     2021 05:24 AM     All Cardiac Markers in the last 24 hours:   Lab Results   Component Value Date/Time     2021 05:24 AM    CKMB 6.5 (H) 02/02/2021 05:24 AM    CKND1 5.1 (H) 02/02/2021 05:24 AM    TROIQ <0.02 02/02/2021 05:24 AM         Procedures/imaging: see electronic medical records for all procedures/Xrays and details which were not copied into this note but were reviewed prior to creation of Plan    Please note that this dictation was completed with studentSN, the computer voice recognition software. Quite often unanticipated grammatical, syntax, homophones, and other interpretive errors are inadvertently transcribed by the computer software. Please disregard these errors. Please excuse any errors that have escaped final proofreading.         CC: Alejandro Alaniz MD

## 2021-02-02 NOTE — ED NOTES
This nurse entered room and patient noted to have taken off BiPAP so he could have some icechips. O2 97% RA. BiPAP placed on standby and O2 placed via NC at 4L NC. Pt states he feels better and his shortness of breath doesn't feel worse without BiPAP. Provider informed and verbal given to remain off BiPAP as long as patient tolerates.

## 2021-02-02 NOTE — PROGRESS NOTES
Reason for Admission:   Chart reviewed; per ED H&P, patient is \" a 66 y.o. male who presents to the emergency department C/O difficulty breathing. Patient arrives by EMS for this problem. Patient states that this is been progressively worsening over the last several days. States he does have a history of COPD and feels like he has a lot of phlegm that he is having a hard time getting up. \"  Sats were 90-92% on RA and patient could only speak in  1-2 word sentences, was tripod position and having difficulty breathing. RUR Score: Moderate, 22%              PCP: First and Last name:  Dr. Tara Vasquez   Name of Practice: Family Medicine   Are you a current patient: Yes/No: yes   Approximate date of last visit: 2-3 months ago   Can you participate in a virtual visit if needed: no    Do you (patient/family) have any concerns for transition/discharge? None at this time              Plan for utilizing home health:  TBD     Current Advanced Directive/Advance Care Plan:  Has ACP on record            Transition of Care Plan:     Met with patient at bedside; patient currently on O2 and patient stated he uses home O2 2L NC, supplier is [employer]. Per patient he has both a cane and walker and he lives with wife; he verified wife will pick him up when he is ready for discharge. Care manager will follow for discharge needs. Care Management Interventions  PCP Verified by CM:  Yes  Mode of Transport at Discharge: Self  Transition of Care Consult (CM Consult): Discharge Planning  Current Support Network: Own Home, Lives with Spouse  Confirm Follow Up Transport: Family  The Plan for Transition of Care is Related to the Following Treatment Goals : home when medically stable  The Patient and/or Patient Representative was Provided with a Choice of Provider and Agrees with the Discharge Plan?: Yes  Name of the Patient Representative Who was Provided with a Choice of Provider and Agrees with the Discharge Plan: Marnie Pichardo, patient  Discharge Location  Discharge Placement: Home with family assistance

## 2021-02-02 NOTE — Clinical Note
Status[de-identified] INPATIENT [101]   Type of Bed: Telemetry [19]   Inpatient Hospitalization Certified Necessary for the Following Reasons: 3.  Patient receiving treatment that can only be provided in an inpatient setting (further clarification in H&P documentation)   Admitting Diagnosis: COPD exacerbation [facility]) [[id number]]   Admitting Diagnosis: Atrial fibrillation with RVR [facility]) [[id number]]   Admitting Physician: Natalia Daigle [[id number]]   Attending Physician: Natalia Daigle [[id number]]   Estimated Length of Stay: 3-4 Midnights   Discharge Plan[de-identified] Home with Office Follow-up

## 2021-02-02 NOTE — ED PROVIDER NOTES
EMERGENCY DEPARTMENT HISTORY AND PHYSICAL EXAM    Date: 2/2/2021  Patient Name: Donis Pantoja    History of Presenting Illness     Chief Complaint   Patient presents with    Shortness of Breath         History Provided By: Patient and EMS    Donis Pantoja is a 66 y.o. male who presents to the emergency department C/O difficulty breathing. Patient arrives by EMS for this problem. Patient states that this is been progressively worsening over the last several days. States he does have a history of COPD and feels like he has a lot of phlegm that he is having a hard time getting up. He also notes a history of congestive heart failure and swelling in his legs but states that the swelling is been there for a long time. Denies any known Covid concerns. EMS reports upon their arrival the patient was in a tripod position, pale and having extreme difficulty in his breathing. They state he was only able to speak in 1-2 word sentences and had an oxygen saturation between 90 and 92% on room air. They state his symptoms started to improve after placing him on CPAP. Patient states has been giving himself breathing treatments at home over the last several days with minimal transient relief. He denies any chest pain. Denies any fevers. Kevinlana Bowen PCP: Ned Jones MD    Current Facility-Administered Medications   Medication Dose Route Frequency Provider Last Rate Last Admin    dilTIAZem IR (CARDIZEM) tablet 60 mg  60 mg Oral NOW Jonh Andrade, DO         Current Outpatient Medications   Medication Sig Dispense Refill    albuterol (PROVENTIL HFA, VENTOLIN HFA, PROAIR HFA) 90 mcg/actuation inhaler Take 2 Puffs by inhalation every six (6) hours as needed for Wheezing. 1 Inhaler 0    OXYGEN-AIR DELIVERY SYSTEMS 2 L/min by Nasal route continuous.  arformoteroL (BROVANA) 15 mcg/2 mL nebu neb solution 2 mL by Nebulization route two (2) times a day.  60 Vial 0    budesonide (PULMICORT) 0.5 mg/2 mL nbsp 2 mL by Nebulization route two (2) times a day. 60 Each 0    predniSONE (STERAPRED) 5 mg dose pack See administration instruction per 5mg dose pack 21 Tab 0    furosemide (Lasix) 20 mg tablet Take 20 mg by mouth daily.  dilTIAZem (CARDIZEM) 30 mg tablet Take 1 Tab by mouth Before breakfast, lunch, and dinner. (Patient taking differently: Take 30 mg by mouth daily. Daily) 90 Tab 0    acetaminophen (TYLENOL) 325 mg tablet Take 650 mg by mouth every eight (8) hours as needed for Pain.  dextran 70/hypromellose (ARTIFICIAL TEARS, PF, OP) Apply 2 Drops to eye as needed for Other (both eyes for dryness).  diphenhydrAMINE (BENADRYL) 25 mg capsule Take 25 mg by mouth nightly as needed for Itching.  albuterol (PROVENTIL HFA, VENTOLIN HFA, PROAIR HFA) 90 mcg/actuation inhaler Take 2 Puffs by inhalation every four (4) hours as needed for Wheezing or Shortness of Breath. 1 Inhaler 1    tamsulosin (FLOMAX) 0.4 mg capsule Take 0.4 mg by mouth every evening. Past History     Past Medical History:  Past Medical History:   Diagnosis Date    Brain aneurysm     Cancer ([address]     prostate    COPD (chronic obstructive pulmonary disease) ([address]     Hypertension     Nocturia     Pneumonia     Radiation effect        Past Surgical History:  Past Surgical History:   Procedure Laterality Date    HX HERNIA REPAIR         Family History:  Family History   Problem Relation Age of Onset    Heart Disease Mother        Social History:  Social History     Tobacco Use    Smoking status: Former Smoker     Types: Cigarettes    Smokeless tobacco: Never Used   Substance Use Topics    Alcohol use: No    Drug use: Never       Allergies: Allergies   Allergen Reactions    Aspirin Other (comments)     \"messes my stomach up\"         Review of Systems   Review of Systems   Constitutional: Negative for fever. Respiratory: Positive for cough, chest tightness, shortness of breath and wheezing. Negative for choking. Cardiovascular: Positive for leg swelling. Negative for chest pain and palpitations. Gastrointestinal: Negative for abdominal pain. All other systems reviewed and are negative. All other systems reviewed and are negative.     Physical Exam     Vitals:    02/02/21 0745 02/02/21 0751 02/02/21 0752 02/02/21 0800   BP: 129/66 (!) 142/68 (!) 142/68 (!) 127/59   Pulse: (!) 117 (!) 115 (!) 119 (!) 111   Resp: 25  25 20   Temp:       SpO2: 100%  100%    Weight:       Height:         Physical Exam    Nursing notes and vital signs reviewed    Airway: intact, speaking in 3-4 word sentences  Breathing: Severe distress, no cyanosis  Circulation: Peripheral pulses equal    Constitutional: Chronically ill-appearing in severe distress with cpap present, appearing stated age  HEENT:  Head: Normocephalic, Atraumatic  Eyes: PERRL, EOMI, No conjunctival injection  Ears: external ears normal  Nose: No rhinorrhea, external nose normal  Throat: mucous membranes moist  Neck: symmetric, trachea midline, no obvious swelling, no JVD  Cardiovascular: Regular rate and rhythm, no murmurs  Lungs: Coarse lung sounds bilaterally, No stridor, increased work of breathing and chest excursion bilaterally  Abdomen: Soft, non tender, non distended, normoactive bowel sounds, No rigidity, no peritoneal signs  Musculoskeletal:  No evidence of obvious deformity to the back, neck or extremities, +2 pitting LE edema  Skin: Warm, dry, No obvious rashes  Neuro: Alert and oriented x 3, CN 2-12 intact, normal speech, strength and sensation full and symmetric bilaterally  Psychiatric: Normal mood and affect      Diagnostic Study Results     Labs -     Recent Results (from the past 72 hour(s))   SARS-COV-2    Collection Time: 02/02/21  5:14 AM   Result Value Ref Range    SARS-CoV-2 Please find results under separate order     COVID-19 RAPID TEST    Collection Time: 02/02/21  5:14 AM   Result Value Ref Range    Specimen source Nasopharyngeal      COVID-19 rapid test Not detected NOTD     EKG, 12 LEAD, INITIAL    Collection Time: 02/02/21  5:22 AM   Result Value Ref Range    Ventricular Rate 103 BPM    Atrial Rate 103 BPM    P-R Interval 162 ms    QRS Duration 82 ms    Q-T Interval 352 ms    QTC Calculation (Bezet) 461 ms    Calculated P Axis 53 degrees    Calculated R Axis 9 degrees    Calculated T Axis 52 degrees    Diagnosis       Sinus tachycardia with premature atrial complexes with aberrant conduction  Low voltage QRS  Borderline ECG  When compared with ECG of 29-DEC-2020 10:56,  premature ventricular complexes are no longer present  aberrant conduction is now present     CBC WITH AUTOMATED DIFF    Collection Time: 02/02/21  5:24 AM   Result Value Ref Range    WBC 10.4 4.6 - 13.2 K/uL    RBC 3.66 (L) 4.70 - 5.50 M/uL    HGB 10.4 (L) 13.0 - 16.0 g/dL    HCT 31.8 (L) 36.0 - 48.0 %    MCV 86.9 74.0 - 97.0 FL    MCH 28.4 24.0 - 34.0 PG    MCHC 32.7 31.0 - 37.0 g/dL    RDW 13.4 11.6 - 14.5 %    PLATELET 115 700 - 506 K/uL    MPV 8.4 (L) 9.2 - 11.8 FL    NEUTROPHILS 75 (H) 40 - 73 %    LYMPHOCYTES 6 (L) 21 - 52 %    MONOCYTES 8 3 - 10 %    EOSINOPHILS 10 (H) 0 - 5 %    BASOPHILS 1 0 - 2 %    ABS. NEUTROPHILS 7.9 1.8 - 8.0 K/UL    ABS. LYMPHOCYTES 0.6 (L) 0.9 - 3.6 K/UL    ABS. MONOCYTES 0.8 0.05 - 1.2 K/UL    ABS. EOSINOPHILS 1.0 (H) 0.0 - 0.4 K/UL    ABS.  BASOPHILS 0.1 0.0 - 0.1 K/UL    DF AUTOMATED     METABOLIC PANEL, COMPREHENSIVE    Collection Time: 02/02/21  5:24 AM   Result Value Ref Range    Sodium 131 (L) 136 - 145 mmol/L    Potassium 4.0 3.5 - 5.5 mmol/L    Chloride 97 (L) 100 - 111 mmol/L    CO2 27 21 - 32 mmol/L    Anion gap 7 3.0 - 18 mmol/L    Glucose 123 (H) 74 - 99 mg/dL    BUN 16 7.0 - 18 MG/DL    Creatinine 1.17 0.6 - 1.3 MG/DL    BUN/Creatinine ratio 14 12 - 20      GFR est AA >60 >60 ml/min/1.73m2    GFR est non-AA >60 >60 ml/min/1.73m2    Calcium 8.3 (L) 8.5 - 10.1 MG/DL    Bilirubin, total 0.4 0.2 - 1.0 MG/DL    ALT (SGPT) 15 (L) 16 - 61 U/L AST (SGOT) 9 (L) 10 - 38 U/L    Alk. phosphatase 101 45 - 117 U/L    Protein, total 6.3 (L) 6.4 - 8.2 g/dL    Albumin 3.1 (L) 3.4 - 5.0 g/dL    Globulin 3.2 2.0 - 4.0 g/dL    A-G Ratio 1.0 0.8 - 1.7     NT-PRO BNP    Collection Time: 02/02/21  5:24 AM   Result Value Ref Range    NT pro- 0 - 1,800 PG/ML   CARDIAC PANEL,(CK, CKMB & TROPONIN)    Collection Time: 02/02/21  5:24 AM   Result Value Ref Range    CK - MB 6.5 (H) <3.6 ng/ml    CK-MB Index 5.1 (H) 0.0 - 4.0 %     39 - 308 U/L    Troponin-I, QT <0.02 0.0 - 0.045 NG/ML   POC G3    Collection Time: 02/02/21  5:50 AM   Result Value Ref Range    Device: BIPAP      FIO2 (POC) 0.30 %    pH (POC) 7.39 7.35 - 7.45      pCO2 (POC) 41.5 35.0 - 45.0 MMHG    pO2 (POC) 125 (H) 80 - 100 MMHG    HCO3 (POC) 25.3 22 - 26 MMOL/L    sO2 (POC) 99 (H) 92 - 97 %    Base excess (POC) 0 mmol/L    PEEP/CPAP (POC) 6 cmH2O    PIP (POC) 16      Pressure support 10 cmH2O    Allens test (POC) YES      Total resp. rate 30      Site RIGHT RADIAL      Patient temp.  98.6      Specimen type (POC) ARTERIAL      Performed by Sai Stannards     Spontaneous timed YES     EKG, 12 LEAD, SUBSEQUENT    Collection Time: 02/02/21  7:07 AM   Result Value Ref Range    Ventricular Rate 138 BPM    Atrial Rate 138 BPM    QRS Duration 88 ms    Q-T Interval 320 ms    QTC Calculation (Bezet) 484 ms    Calculated R Axis 16 degrees    Calculated T Axis 32 degrees    Diagnosis       Atrial fibrillation with rapid ventricular response with premature   ventricular or aberrantly conducted complexes  Abnormal ECG  When compared with ECG of 02-FEB-2021 05:22,  Atrial fibrillation has replaced Sinus rhythm         Radiologic Studies -   XR CHEST PORT    (Results Pending)   RADIOLOGY FINDINGS  Chest x-ray shows no acute process  Pending review by Radiologist  Recorded by Salvatore Andrade, DO      CT Results  (Last 48 hours)    None        CXR Results  (Last 48 hours)    None          Medications given in the ED-  Medications   dilTIAZem IR (CARDIZEM) tablet 60 mg (has no administration in time range)   methylPREDNISolone (PF) (Solu-MEDROL) injection 125 mg (125 mg IntraVENous Given 2/2/21 0529)   magnesium sulfate 2 g/50 ml IVPB (premix or compounded) (0 g IntraVENous IV Completed 2/2/21 0629)   albuterol-ipratropium (DUO-NEB) 2.5 MG-0.5 MG/3 ML (3 mL Nebulization Given 2/2/21 0600)   albuterol-ipratropium (DUO-NEB) 2.5 MG-0.5 MG/3 ML (3 mL Nebulization Given 2/2/21 0559)   dilTIAZem (CARDIZEM) injection 25 mg (25 mg IntraVENous Given 2/2/21 0718)   dilTIAZem (CARDIZEM) injection 25 mg (25 mg IntraVENous Given 2/2/21 0751)         Medical Decision Making     I reviewed the vital signs, available nursing notes, past medical history, past surgical history, family history and social history. Vital Signs interpretation- I have reviewed the patient's vital signs. Pulse Oximetry interpretation - 100% on bipap 30% fiO2    Cardiac Monitor interpretation:  Rate: 103 bpm  Rhythm: sinus    EKG interpretation: (Preliminary)  EKG interpretation by Dr. Mary Larios 103 sinus tachycardia, normal axis, infrequent PVCs, no significant ST changes    EKG interpretation #2: (Preliminary)  EKG interpretation by Dr. Mary Larios 138 possibly atrial fib/flutter although some P waves are potentially noted in V3, PVCs are noted. I was not able to see this on a prior EKG. Will consider treating with antiarrhythmics    EKG interpretation: (Preliminary)  8:31 AM   A. fib 98 bpm.  QTc 451 ms. QRS 82 ms. No acute ST elevation  EKG read by Jaelyn Hernández DO      Records Reviewed: Nursing Notes and Old Medical Records    Procedures:  Procedures    ED Course & MDM:   Patient does have history of recurrent COPD exacerbations, he does have supplemental oxygen at home based on his prior charting. We will continue with his BiPAP and obtain a rapid Covid and get a chest x-ray and blood work.   We will give IV Solu-Medrol and magnesium     Laboratory findings unremarkable, BNP and troponin negative, chest x-ray does show similar appearing streaky coarse regions worse in the right lung versus the left. His rapid Covid is noted to be negative. Will give 2 DuoNeb breathing treatments. His ABG does not appear to have any evidence of acute respiratory acidosis. He does seem to be tolerating the BiPAP well. Patients are developing some tachyarrhythmia heart rate between 130 and 140, upon evaluation the patient he appears comfortable does not have any complaints. Repeat EKG shows possibly atrial fib/flutter although some P waves are potentially noted in V3, PVCs are noted. I was not able to see this on a prior EKG. Will consider treating with antiarrhythmics    SIGN OUT:  7:13 AM  Patient's presentation, labs/imaging and plan of care was reviewed with Dr. Praneeth Tom as part of sign out. They will follow up with labs/imaging/dispo as needed as part of the plan discussed with the patient. Tami Penn, DO    7:24 AM  Since EKG appears to be in A. fib with RVR. Patient given a dose of Cardizem. Heart rates improved to the low 100s however is still appears to be in A. fib with RVR. Will give another dose of Cardizem and reassess. Patient off BiPAP and on 4 L of oxygen. Saturating at 97% on room air. 8:16 AM  Patient given another dose of Cardizem with better rate control with heart rates in the low 100s. Will transition to oral Cardizem. Patient still requiring additional supplemental oxygen, currently at 4 L, baseline of 2 L of chronic oxygen at home. Will admit for COPD exacerbation and A. fib with RVR. Diagnosis and Disposition     7:25 AM  I have spent 75 minutes of critical care time involved in lab review, consultations with specialist, family decision-making, and documentation. During this entire length of time I was immediately available to the patient. Critical Care:   The reason for providing this level of medical care for this critically ill patient was due a critical illness that impaired one or more vital organ systems such that there was a high probability of imminent or life threatening deterioration in the patients condition. This care involved high complexity decision making to assess, manipulate, and support vital system functions, to treat this degreee vital organ system failure and to prevent further life threatening deterioration of the patients condition. Core Measures:  For Hospitalized Patients:    1. Hospitalization Decision Time:  The decision to hospitalize the patient was made by Orysia Mortimer, DO at 8:17 AM on 2/2/2021    2. Aspirin: Aspirin was not given because the patient did not present with a stroke at the time of their Emergency Department evaluation    7:25 AM  Patient is being admitted to the hospital by Eyad Daigle MD. The results of their tests and reasons for their admission have been discussed with them and/or available family. They convey agreement and understanding for the need to be admitted and for their admission diagnosis. CONDITIONS ON ADMISSION:  Sepsis is not present at the time of admission. Pneumonia is not present at the time of admission. MRSA is not present at the time of admission. Wound infection is not present at the time of admission. Pressure Ulcer is not present at the time of admission. CLINICAL IMPRESSION:    1. COPD with acute exacerbation ([address]    2. Respiratory distress    3. Atrial fibrillation with RVR ([address]      _______________________________      Please note that this dictation was completed with [employer], the computer voice recognition software. Quite often unanticipated grammatical, syntax, homophones, and other interpretive errors are inadvertently transcribed by the computer software. Please disregard these errors. Please excuse any errors that have escaped final proofreading.

## 2021-02-02 NOTE — ED NOTES
Patient with increased heart rate, 140-160 bpm noted. Dr. Anny Gregory notified and in to re-assess. Patient denies feeling of palpitations/racing heart or chest pain. Ice chips given as requested with assistance. Will continue to monitor.

## 2021-02-02 NOTE — ED NOTES
Patient resting with eyes closed, heart rate 110-130 bpm at this time - comfort measures offered and declined, will continue to monitor.

## 2021-02-02 NOTE — PROGRESS NOTES
TRANSFER - IN REPORT:    Verbal report received from [address] [place] [address] [address] RN(name) on August Borne  being received from ED (unit) for routine progression of care      Report consisted of patients Situation, Background, Assessment and   Recommendations(SBAR). Information from the following report(s) ED Summary was reviewed with the receiving nurse. Opportunity for questions and clarification was provided. Assessment completed upon patients arrival to unit and care assumed. Pt AO x4, denies pain. On 3L NC, expiratory wheezes heard throughout lung fields. Abdomen soft and intact, pt reports last BM 2/1 prior to admission. Call light within reach. Bedside and Verbal shift change report given to Lesa TUCKER (oncoming nurse) by Viola Lai RN (offgoing nurse). Report included the following information SBAR, Kardex, Intake/Output, MAR and Recent Results.

## 2021-02-02 NOTE — ED NOTES
Patient presents to ER by EMS with complaints of shortness of breath. Per EMS patient with increasing shortness of breath overnight and awoke from sleep. Upon EMS arrival patient administering own nebulizer treatment and initial oxygen saturation 90-92%. Patient placed on CPAP by EMS en-route at 13 L/min. Upon arrival to ER patient with oxygen saturation of 100% on CPAP. Tachycardia and tachypnea noted, skin pink and warm, increased work of breathing with moderate distress noted. Patient with armband in place, bed in low position, call light within reach, monitor in place, comfort measures offered and declined, will continue to monitor.

## 2021-02-03 LAB
ANION GAP SERPL CALC-SCNC: 9 MMOL/L (ref 3–18)
BUN SERPL-MCNC: 23 MG/DL (ref 7–18)
BUN/CREAT SERPL: 20 (ref 12–20)
CALCIUM SERPL-MCNC: 8.8 MG/DL (ref 8.5–10.1)
CHLORIDE SERPL-SCNC: 97 MMOL/L (ref 100–111)
CO2 SERPL-SCNC: 24 MMOL/L (ref 21–32)
CREAT SERPL-MCNC: 1.16 MG/DL (ref 0.6–1.3)
ERYTHROCYTE [DISTWIDTH] IN BLOOD BY AUTOMATED COUNT: 13.3 % (ref 11.6–14.5)
GLUCOSE BLD STRIP.AUTO-MCNC: 135 MG/DL (ref 70–110)
GLUCOSE BLD STRIP.AUTO-MCNC: 137 MG/DL (ref 70–110)
GLUCOSE BLD STRIP.AUTO-MCNC: 141 MG/DL (ref 70–110)
GLUCOSE BLD STRIP.AUTO-MCNC: 142 MG/DL (ref 70–110)
GLUCOSE SERPL-MCNC: 123 MG/DL (ref 74–99)
HCT VFR BLD AUTO: 32.2 % (ref 36–48)
HGB BLD-MCNC: 10.5 G/DL (ref 13–16)
MCH RBC QN AUTO: 28.3 PG (ref 24–34)
MCHC RBC AUTO-ENTMCNC: 32.6 G/DL (ref 31–37)
MCV RBC AUTO: 86.8 FL (ref 74–97)
PLATELET # BLD AUTO: 410 K/UL (ref 135–420)
PMV BLD AUTO: 8.7 FL (ref 9.2–11.8)
POTASSIUM SERPL-SCNC: 4.7 MMOL/L (ref 3.5–5.5)
RBC # BLD AUTO: 3.71 M/UL (ref 4.7–5.5)
SODIUM SERPL-SCNC: 130 MMOL/L (ref 136–145)
WBC # BLD AUTO: 9.8 K/UL (ref 4.6–13.2)

## 2021-02-03 PROCEDURE — 65660000000 HC RM CCU STEPDOWN

## 2021-02-03 PROCEDURE — 82962 GLUCOSE BLOOD TEST: CPT

## 2021-02-03 PROCEDURE — 80048 BASIC METABOLIC PNL TOTAL CA: CPT

## 2021-02-03 PROCEDURE — 36415 COLL VENOUS BLD VENIPUNCTURE: CPT

## 2021-02-03 PROCEDURE — 74011250636 HC RX REV CODE- 250/636: Performed by: HOSPITALIST

## 2021-02-03 PROCEDURE — 85027 COMPLETE CBC AUTOMATED: CPT

## 2021-02-03 PROCEDURE — 74011250637 HC RX REV CODE- 250/637: Performed by: HOSPITALIST

## 2021-02-03 PROCEDURE — 77010033678 HC OXYGEN DAILY

## 2021-02-03 PROCEDURE — 94640 AIRWAY INHALATION TREATMENT: CPT

## 2021-02-03 PROCEDURE — 94760 N-INVAS EAR/PLS OXIMETRY 1: CPT

## 2021-02-03 PROCEDURE — 74011000250 HC RX REV CODE- 250: Performed by: HOSPITALIST

## 2021-02-03 RX ADMIN — METHYLPREDNISOLONE SODIUM SUCCINATE 40 MG: 40 INJECTION, POWDER, FOR SOLUTION INTRAMUSCULAR; INTRAVENOUS at 06:19

## 2021-02-03 RX ADMIN — IPRATROPIUM BROMIDE AND ALBUTEROL SULFATE 3 ML: .5; 3 SOLUTION RESPIRATORY (INHALATION) at 11:25

## 2021-02-03 RX ADMIN — IPRATROPIUM BROMIDE AND ALBUTEROL SULFATE 3 ML: .5; 3 SOLUTION RESPIRATORY (INHALATION) at 07:18

## 2021-02-03 RX ADMIN — BUDESONIDE 500 MCG: 0.5 INHALANT RESPIRATORY (INHALATION) at 20:22

## 2021-02-03 RX ADMIN — IPRATROPIUM BROMIDE AND ALBUTEROL SULFATE 3 ML: .5; 3 SOLUTION RESPIRATORY (INHALATION) at 15:56

## 2021-02-03 RX ADMIN — BUDESONIDE 500 MCG: 0.5 INHALANT RESPIRATORY (INHALATION) at 07:17

## 2021-02-03 RX ADMIN — ARFORMOTEROL TARTRATE 15 MCG: 15 SOLUTION RESPIRATORY (INHALATION) at 07:18

## 2021-02-03 RX ADMIN — ENOXAPARIN SODIUM 40 MG: 40 INJECTION SUBCUTANEOUS at 14:51

## 2021-02-03 RX ADMIN — TAMSULOSIN HYDROCHLORIDE 0.4 MG: 0.4 CAPSULE ORAL at 19:09

## 2021-02-03 RX ADMIN — IPRATROPIUM BROMIDE AND ALBUTEROL SULFATE 3 ML: .5; 3 SOLUTION RESPIRATORY (INHALATION) at 20:22

## 2021-02-03 RX ADMIN — IPRATROPIUM BROMIDE AND ALBUTEROL SULFATE 3 ML: .5; 3 SOLUTION RESPIRATORY (INHALATION) at 23:58

## 2021-02-03 RX ADMIN — FUROSEMIDE 20 MG: 20 TABLET ORAL at 08:55

## 2021-02-03 RX ADMIN — METHYLPREDNISOLONE SODIUM SUCCINATE 40 MG: 40 INJECTION, POWDER, FOR SOLUTION INTRAMUSCULAR; INTRAVENOUS at 22:35

## 2021-02-03 RX ADMIN — ARFORMOTEROL TARTRATE 15 MCG: 15 SOLUTION RESPIRATORY (INHALATION) at 20:22

## 2021-02-03 RX ADMIN — METHYLPREDNISOLONE SODIUM SUCCINATE 40 MG: 40 INJECTION, POWDER, FOR SOLUTION INTRAMUSCULAR; INTRAVENOUS at 14:51

## 2021-02-03 RX ADMIN — IPRATROPIUM BROMIDE AND ALBUTEROL SULFATE 3 ML: .5; 3 SOLUTION RESPIRATORY (INHALATION) at 04:54

## 2021-02-03 NOTE — PROGRESS NOTES
Problem: Falls - Risk of  Goal: *Absence of Falls  Description: Document Torito Esquivel Fall Risk and appropriate interventions in the flowsheet. Outcome: Progressing Towards Goal  Note: Fall Risk Interventions:  Mobility Interventions: Assess mobility with egress test, Patient to call before getting OOB, PT Consult for assist device competence, PT Consult for mobility concerns         Medication Interventions: Teach patient to arise slowly, Patient to call before getting OOB                   Problem: Patient Education: Go to Patient Education Activity  Goal: Patient/Family Education  Outcome: Progressing Towards Goal     Problem: Pressure Injury - Risk of  Goal: *Prevention of pressure injury  Description: Document Nikos Scale and appropriate interventions in the flowsheet. Outcome: Progressing Towards Goal  Note: Pressure Injury Interventions:             Activity Interventions: PT/OT evaluation, Increase time out of bed, Pressure redistribution bed/mattress(bed type)    Mobility Interventions: Pressure redistribution bed/mattress (bed type), PT/OT evaluation, HOB 30 degrees or less    Nutrition Interventions: Offer support with meals,snacks and hydration, Document food/fluid/supplement intake    Friction and Shear Interventions: Minimize layers, HOB 30 degrees or less                Problem: Patient Education: Go to Patient Education Activity  Goal: Patient/Family Education  Outcome: Progressing Towards Goal

## 2021-02-03 NOTE — ADVANCED PRACTICE NURSE
[facility]  Clinical Nurse Specialist Rounding Note      NAME: Bro Campos  MRN: [id number]  AGE: 66 y.o., : 1945  DATE OF ADMISSION: 2021  Rounding Date and Time: 2/3/2021 12:49 PM  Attending Provider: Melissa Goodpasture, MD  Reason for Admission: Shortness of Breath    Primary Diagnosis:COPD exacerbation   Code Status: Prior  Allergies: Allergies   Allergen Reactions    Aspirin Other (comments)     \"messes my stomach up\"       Assessment/Plan:  1. COPD. Grunting, dyspneic at rest. Lung sounds with wheezing. Productive cough. Still able to ambulate to restroom & eat despite dyspnea. On oxygen 2L/min via nasal cannula. Feels somewhat better today. a. Inpt medications: Duo-neb, Brovana, Pulmicort, Solu-medrol. b. Outpt medications: only reports using albuterol nebulizer, ventolin inhaler, and one other inhaler that he says is prescribed by Dr. Angelique Hussein. i. Rob Shook at home - reports that it is working correctly  ii. Inhalers - seem only to help \"to a point. \"  c. Oxygen Therapy- baseline 2L/min at all times. d. Maintain between O2 saturation 88% to 94%  e. Incentive spirometry if he can tolerate 10 reps/hour. f. Follow-up: PCP is Dr. Rachel Thompson, pulmonologist is Dr. Joanna Robbins. i. States that he hasn't seen Dr. Angelique Hussein in a few months. Needs follow up for 6 months. ii. Home environment - Mold? 1. He wasn't able to get his house cleaned of mold since last August.   g. Rec: Limited mobility and increased SoB contribute to decreased quality of life - pt understands this about COPD. CM to assist to make appointment with Dr. Angelique Hussein. Is there a way to help him clean his home? 2. Leg pain - neuropathy vs DVT? Numbness present in both feet, but this discomfort is speciifically on his left side. Slowly worse during last couple of months. Described as \"as if my leg locks up and throws me on the floor. \" BLE have edema +2 - +3, though pt states it is not new. Denies being diabetic.  He has a duplex from 6/2020 that shows no DVT. Denies having arthritis. a. DVT - less likely as swelling is present in both legs. b. DVT prophylaxis - lovenox currently. i. Does not take an anticoagulant at home. c. Rec: Consider left leg side duplex ultrasound if suspicious for DVT. Physical therapy consult for left leg and general mobility. 3. Care transition of older adult - grunting and difficulty breathing with COPD.   a. Code status (order not active): ACP documents on chart indicate from August 2020. Discussed with Palliative Care  GREGORIO Erickson who also indicates that his current code should be DNR.   b. Nutrition - eating well, despite his food being cold. c. Mobility - able to get up from bed with minimal assistance and a walker   d. Skin - intact  e. Elimination - voids frequently (on lasix) and has been regular BM daily. f. Sensory - Extremely hard of hearing despite having hearing aids. He hears better on his left side. g. Rec: Needs DNR order. Subjective:    Chief Complaint: SoB with mucus  Verbatim: \"I starting coming up with a lot of mucus\"    HPI: Abhinav Tobar is a 66 y.o. male who presents with shortness of breath. He is exceedingly hard of hearing despite having hearing aids in both ears. This interview is somewhat limited because of this and current pandemic masking precautions. He states that he is often short of breath but he started to have a productive, mucous cough, which prompted him to seek medical attention. When this episode started, he denies performing any particular activity, saying that it just \"acted up. \" He tried his albuterol, but it only helped him somewhat. He has a history of COPD, prostate cancer, htn, brain aneurysm, and smoking. He has been seen by me before during his last admission at [facility] in August 2020. He states he has not needed to be admitted anywhere else for COPD or breathing problems since then.    When asked about his COPD, he understands that it is not a curable disease. Knowing that his COPD is progressing, he indicates that if his heart stopped or he stopped breathing, he \"would not want to be a vegetable. \" He states that his primary provider is Dr. Lea Elias, and his pulmonologist is Dr. Sujey Gregory, both of which he hasn't seen in a few months. His medication include albuterol nebulizer and ventolin inhaler. He states that he has one other inhaler that Dr. Sujey Gregory prescribed, but he does not recall the name. His oxygen therapy is 2L/min via nasal cannula at home. In a chart review, he had seen Dr. Lea Elias, but has not recently seen Dr. Sujey Gregory. Last time he was admitted, I recommended to him to clean his home of mold, which may exacerbate his COPD - but he hasn't been able to because he could not afford cleaning. Aside from [facility], he also mentions that his left leg has been uncomfortable the last few months, and this makes it harder for him to move. He denies having arthritis. Hx:   Past Medical History:   Diagnosis Date    Brain aneurysm     Cancer ([facility] [address]     prostate    COPD [facility]chronic obstructive pulmonary disease) ([facility])     Hypertension     Nocturia     Pneumonia     Radiation effect           Objective:    Physical Assessment:    General: Alert and no distress. Oriented to Person, Place, Time and Situation    HEENT: PERRLA. Not hard of hearing. Respiratory: wheezing bilat fields    Cardiac: On telemetry box showing NSR with HR 80.    GI: Bowel sounds active. : Void status is voiding well without difficulty  Skin: Skin color, texture, turgor normal. No rashes or lesions   Extremities: BLE +3 pitting edema, pink color. Diminished sensation BLE. Pedal pulses intact. BUE without edema, with palpable radial pulses.      Vitals:  Patient Vitals for the past 12 hrs:   Temp Pulse Resp BP SpO2   02/03/21 1204 97.7 °F (36.5 °C) (!) 103 24 (!) 148/70 96 %   02/03/21 0842 97.5 °F (36.4 °C) (!) 108 26 (!) 141/56 97 %   02/03/21 0403 97.6 °F (36.4 °C) 91 24 (!) 147/75 98 %        Pertinent labs:  Recent Results (from the past 12 hour(s))   METABOLIC PANEL, BASIC    Collection Time: 02/03/21  7:30 AM   Result Value Ref Range    Sodium 130 (L) 136 - 145 mmol/L    Potassium 4.7 3.5 - 5.5 mmol/L    Chloride 97 (L) 100 - 111 mmol/L    CO2 24 21 - 32 mmol/L    Anion gap 9 3.0 - 18 mmol/L    Glucose 123 (H) 74 - 99 mg/dL    BUN 23 (H) 7.0 - 18 MG/DL    Creatinine 1.16 0.6 - 1.3 MG/DL    BUN/Creatinine ratio 20 12 - 20      GFR est AA >60 >60 ml/min/1.73m2    GFR est non-AA >60 >60 ml/min/1.73m2    Calcium 8.8 8.5 - 10.1 MG/DL   CBC W/O DIFF    Collection Time: 02/03/21  7:30 AM   Result Value Ref Range    WBC 9.8 4.6 - 13.2 K/uL    RBC 3.71 (L) 4.70 - 5.50 M/uL    HGB 10.5 (L) 13.0 - 16.0 g/dL    HCT 32.2 (L) 36.0 - 48.0 %    MCV 86.8 74.0 - 97.0 FL    MCH 28.3 24.0 - 34.0 PG    MCHC 32.6 31.0 - 37.0 g/dL    RDW 13.3 11.6 - 14.5 %    PLATELET 109 199 - 176 K/uL    MPV 8.7 (L) 9.2 - 11.8 FL   GLUCOSE, POC    Collection Time: 02/03/21  8:02 AM   Result Value Ref Range    Glucose (POC) 135 (H) 70 - 110 mg/dL   GLUCOSE, POC    Collection Time: 02/03/21 12:00 PM   Result Value Ref Range    Glucose (POC) 137 (H) 70 - 110 mg/dL           Current Facility-Administered Medications:     furosemide (LASIX) tablet 20 mg, 20 mg, Oral, DAILY, Monika Sierra MD, 20 mg at 02/03/21 0855    tamsulosin (FLOMAX) capsule 0.4 mg, 0.4 mg, Oral, QPM, Monika Sierra MD, 0.4 mg at 02/02/21 1742    glucose chewable tablet 16 g, 16 g, Oral, PRN, Monika Grubbs MD    glucagon (GLUCAGEN) injection 1 mg, 1 mg, IntraMUSCular, PRN, Monika Grubbs MD    insulin lispro (HUMALOG) injection, , SubCUTAneous, AC&HS, Monika Grubbs MD, Stopped at 02/02/21 2200    dextrose 10% infusion 125-250 mL, 125-250 mL, IntraVENous, PRN, Monika Grubbs MD    albuterol-ipratropium (DUO-NEB) 2.5 MG-0.5 MG/3 ML, 3 mL, Nebulization, Q4H RT, AmerMonika MD, 3 mL at 02/03/21 1125   methylPREDNISolone (PF) (SOLU-MEDROL) injection 40 mg, 40 mg, IntraVENous, Q8H, Monika Sierra MD, 40 mg at 02/03/21 0619    enoxaparin (LOVENOX) injection 40 mg, 40 mg, SubCUTAneous, Q24H, Monika Sierra MD, 40 mg at 02/02/21 1311    arformoteroL (BROVANA) neb solution 15 mcg, 15 mcg, Nebulization, BID RT, Monika Bee MD, 15 mcg at 02/03/21 6161    budesonide (PULMICORT) 500 mcg/2 ml nebulizer suspension, 500 mcg, Nebulization, BID RT, Tera Pak MD, 500 mcg at 02/03/21 Ul. Cathi Herring, MSN, RN, [facility]    Clinical Nurse Specialist  [address], [address] [place] [address]  Office: [phone] [address]: [phone]

## 2021-02-03 NOTE — PROGRESS NOTES
Problem: Falls - Risk of  Goal: *Absence of Falls  Description: Document Muriel Childress Fall Risk and appropriate interventions in the flowsheet. Outcome: Progressing Towards Goal  Note: Fall Risk Interventions:  Mobility Interventions: Bed/chair exit alarm, Communicate number of staff needed for ambulation/transfer         Medication Interventions: Bed/chair exit alarm, Patient to call before getting OOB                   Problem: Patient Education: Go to Patient Education Activity  Goal: Patient/Family Education  Outcome: Progressing Towards Goal     Problem: Pressure Injury - Risk of  Goal: *Prevention of pressure injury  Description: Document Nikos Scale and appropriate interventions in the flowsheet. Outcome: Progressing Towards Goal  Note: Pressure Injury Interventions:             Activity Interventions: Pressure redistribution bed/mattress(bed type), PT/OT evaluation    Mobility Interventions: Pressure redistribution bed/mattress (bed type), PT/OT evaluation    Nutrition Interventions: Document food/fluid/supplement intake    Friction and Shear Interventions: Minimize layers, HOB 30 degrees or less                Problem: Patient Education: Go to Patient Education Activity  Goal: Patient/Family Education  Outcome: Progressing Towards Goal

## 2021-02-03 NOTE — PROGRESS NOTES
Problem: Falls - Risk of  Goal: *Absence of Falls  Description: Document Earma Carlin Fall Risk and appropriate interventions in the flowsheet. Outcome: Progressing Towards Goal  Note: Fall Risk Interventions:  Mobility Interventions: Assess mobility with egress test, Patient to call before getting OOB, PT Consult for mobility concerns, PT Consult for assist device competence, Utilize walker, cane, or other assistive device         Medication Interventions: Teach patient to arise slowly, Patient to call before getting OOB, Evaluate medications/consider consulting pharmacy    Elimination Interventions: Patient to call for help with toileting needs, Call light in reach              Problem: Patient Education: Go to Patient Education Activity  Goal: Patient/Family Education  Outcome: Progressing Towards Goal     Problem: Pressure Injury - Risk of  Goal: *Prevention of pressure injury  Description: Document Nikos Scale and appropriate interventions in the flowsheet. Outcome: Progressing Towards Goal  Note: Pressure Injury Interventions:             Activity Interventions: Pressure redistribution bed/mattress(bed type), Increase time out of bed, PT/OT evaluation    Mobility Interventions: Pressure redistribution bed/mattress (bed type), PT/OT evaluation    Nutrition Interventions: Document food/fluid/supplement intake, Offer support with meals,snacks and hydration    Friction and Shear Interventions: Minimize layers, HOB 30 degrees or less                Problem: Patient Education: Go to Patient Education Activity  Goal: Patient/Family Education  Outcome: Progressing Towards Goal

## 2021-02-03 NOTE — PROGRESS NOTES
Bedside and Verbal shift change report given to Ralph Torres RN (oncoming nurse) by Jelani Deluca (offgoing nurse). Report included the following information SBAR, Kardex, Intake/Output, MAR and Recent Results. Shift assessment complete. Pt has no concerns at this time and denies pain. Bed locked and in lowest position, call light within reach. Bedside and Verbal shift change report given to Adriel Valenzuela RN (oncoming nurse) by Ralph Torres RN (offgoing nurse). Report included the following information SBAR, Kardex, Intake/Output, MAR and Recent Results.

## 2021-02-03 NOTE — ADVANCED PRACTICE NURSE
Received medication list from Dr. Ana Hoff office to identify inhalers: The only recently-refilled medication is Ipratropium 0.5 mg - albuterol 3 mg QID PRN on 11/11/2020  Ventolin 2 puffs q4-6 refilled last 7/14/2020  Stiolto respimat (inhaler), fluticasone 50 mcg nasal spray, clonazapam 0.5 mg PO were never refilled. Also,   Doxazosin 2 mg every bedtime & chlorthalidone 25 mg daily last refilled in 2017. Placed copy of med list in pt's chart.

## 2021-02-04 LAB
ANION GAP SERPL CALC-SCNC: 9 MMOL/L (ref 3–18)
BUN SERPL-MCNC: 28 MG/DL (ref 7–18)
BUN/CREAT SERPL: 24 (ref 12–20)
CALCIUM SERPL-MCNC: 8.6 MG/DL (ref 8.5–10.1)
CHLORIDE SERPL-SCNC: 97 MMOL/L (ref 100–111)
CO2 SERPL-SCNC: 26 MMOL/L (ref 21–32)
CREAT SERPL-MCNC: 1.15 MG/DL (ref 0.6–1.3)
ERYTHROCYTE [DISTWIDTH] IN BLOOD BY AUTOMATED COUNT: 13.5 % (ref 11.6–14.5)
GLUCOSE BLD STRIP.AUTO-MCNC: 123 MG/DL (ref 70–110)
GLUCOSE BLD STRIP.AUTO-MCNC: 139 MG/DL (ref 70–110)
GLUCOSE BLD STRIP.AUTO-MCNC: 179 MG/DL (ref 70–110)
GLUCOSE BLD STRIP.AUTO-MCNC: 195 MG/DL (ref 70–110)
GLUCOSE SERPL-MCNC: 139 MG/DL (ref 74–99)
HCT VFR BLD AUTO: 31 % (ref 36–48)
HGB BLD-MCNC: 10.1 G/DL (ref 13–16)
MCH RBC QN AUTO: 28.6 PG (ref 24–34)
MCHC RBC AUTO-ENTMCNC: 32.6 G/DL (ref 31–37)
MCV RBC AUTO: 87.8 FL (ref 74–97)
PLATELET # BLD AUTO: 380 K/UL (ref 135–420)
PMV BLD AUTO: 8.6 FL (ref 9.2–11.8)
POTASSIUM SERPL-SCNC: 4.7 MMOL/L (ref 3.5–5.5)
RBC # BLD AUTO: 3.53 M/UL (ref 4.7–5.5)
SODIUM SERPL-SCNC: 132 MMOL/L (ref 136–145)
WBC # BLD AUTO: 8.8 K/UL (ref 4.6–13.2)

## 2021-02-04 PROCEDURE — 82962 GLUCOSE BLOOD TEST: CPT

## 2021-02-04 PROCEDURE — 74011000250 HC RX REV CODE- 250: Performed by: HOSPITALIST

## 2021-02-04 PROCEDURE — 65660000000 HC RM CCU STEPDOWN

## 2021-02-04 PROCEDURE — 77010033678 HC OXYGEN DAILY

## 2021-02-04 PROCEDURE — 74011250636 HC RX REV CODE- 250/636: Performed by: HOSPITALIST

## 2021-02-04 PROCEDURE — 94640 AIRWAY INHALATION TREATMENT: CPT

## 2021-02-04 PROCEDURE — 80048 BASIC METABOLIC PNL TOTAL CA: CPT

## 2021-02-04 PROCEDURE — 74011250637 HC RX REV CODE- 250/637: Performed by: HOSPITALIST

## 2021-02-04 PROCEDURE — 74011636637 HC RX REV CODE- 636/637: Performed by: HOSPITALIST

## 2021-02-04 PROCEDURE — 85027 COMPLETE CBC AUTOMATED: CPT

## 2021-02-04 PROCEDURE — 36415 COLL VENOUS BLD VENIPUNCTURE: CPT

## 2021-02-04 PROCEDURE — 94760 N-INVAS EAR/PLS OXIMETRY 1: CPT

## 2021-02-04 RX ADMIN — ARFORMOTEROL TARTRATE 15 MCG: 15 SOLUTION RESPIRATORY (INHALATION) at 07:22

## 2021-02-04 RX ADMIN — INSULIN LISPRO 2 UNITS: 100 INJECTION, SOLUTION INTRAVENOUS; SUBCUTANEOUS at 13:26

## 2021-02-04 RX ADMIN — IPRATROPIUM BROMIDE AND ALBUTEROL SULFATE 3 ML: .5; 3 SOLUTION RESPIRATORY (INHALATION) at 20:07

## 2021-02-04 RX ADMIN — METHYLPREDNISOLONE SODIUM SUCCINATE 40 MG: 40 INJECTION, POWDER, FOR SOLUTION INTRAMUSCULAR; INTRAVENOUS at 22:25

## 2021-02-04 RX ADMIN — INSULIN LISPRO 2 UNITS: 100 INJECTION, SOLUTION INTRAVENOUS; SUBCUTANEOUS at 22:00

## 2021-02-04 RX ADMIN — TAMSULOSIN HYDROCHLORIDE 0.4 MG: 0.4 CAPSULE ORAL at 17:17

## 2021-02-04 RX ADMIN — ENOXAPARIN SODIUM 40 MG: 40 INJECTION SUBCUTANEOUS at 13:27

## 2021-02-04 RX ADMIN — METHYLPREDNISOLONE SODIUM SUCCINATE 40 MG: 40 INJECTION, POWDER, FOR SOLUTION INTRAMUSCULAR; INTRAVENOUS at 14:00

## 2021-02-04 RX ADMIN — IPRATROPIUM BROMIDE AND ALBUTEROL SULFATE 3 ML: .5; 3 SOLUTION RESPIRATORY (INHALATION) at 11:00

## 2021-02-04 RX ADMIN — IPRATROPIUM BROMIDE AND ALBUTEROL SULFATE 3 ML: .5; 3 SOLUTION RESPIRATORY (INHALATION) at 03:26

## 2021-02-04 RX ADMIN — BUDESONIDE 500 MCG: 0.5 INHALANT RESPIRATORY (INHALATION) at 20:07

## 2021-02-04 RX ADMIN — IPRATROPIUM BROMIDE AND ALBUTEROL SULFATE 3 ML: .5; 3 SOLUTION RESPIRATORY (INHALATION) at 15:35

## 2021-02-04 RX ADMIN — BUDESONIDE 500 MCG: 0.5 INHALANT RESPIRATORY (INHALATION) at 07:22

## 2021-02-04 RX ADMIN — FUROSEMIDE 20 MG: 20 TABLET ORAL at 08:40

## 2021-02-04 RX ADMIN — IPRATROPIUM BROMIDE AND ALBUTEROL SULFATE 3 ML: .5; 3 SOLUTION RESPIRATORY (INHALATION) at 07:22

## 2021-02-04 RX ADMIN — ARFORMOTEROL TARTRATE 15 MCG: 15 SOLUTION RESPIRATORY (INHALATION) at 20:06

## 2021-02-04 RX ADMIN — METHYLPREDNISOLONE SODIUM SUCCINATE 40 MG: 40 INJECTION, POWDER, FOR SOLUTION INTRAMUSCULAR; INTRAVENOUS at 06:57

## 2021-02-04 NOTE — PROGRESS NOTES
No immediate CM needs at this time, CM to follow the patient's progress and be available to assist as needed. Care Management Interventions  PCP Verified by CM:  Yes  Mode of Transport at Discharge: Self  Transition of Care Consult (CM Consult): Discharge Planning  Current Support Network: Own Home, Lives with Spouse  Confirm Follow Up Transport: Family  The Plan for Transition of Care is Related to the Following Treatment Goals : home when medically stable  The Patient and/or Patient Representative was Provided with a Choice of Provider and Agrees with the Discharge Plan?: Yes  Name of the Patient Representative Who was Provided with a Choice of Provider and Agrees with the Discharge Plan: Parker Lezama, patient  Discharge Location  Discharge Placement: Home with family assistance

## 2021-02-04 NOTE — PROGRESS NOTES
Problem: Falls - Risk of  Goal: *Absence of Falls  Description: Document Clydene Bone Fall Risk and appropriate interventions in the flowsheet.   Outcome: Progressing Towards Goal  Note: Fall Risk Interventions:  Mobility Interventions: Assess mobility with egress test         Medication Interventions: Teach patient to arise slowly    Elimination Interventions: Call light in reach

## 2021-02-04 NOTE — PROGRESS NOTES
Hospitalist Progress Note    Patient: Sharyle Furl MRN: [id number]  CSN: [id number]    YOB: 1945  Age: 66 y.o. Sex: male    DOA: 2/2/2021 LOS:  LOS: 1 day                Assessment/Plan     Patient Active Problem List   Diagnosis Code    Hypertension I10    COPD (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 ([facility]) J44.9    Chronic renal disease, stage 3, moderately decreased glomerular filtration rate between 30-59 mL/min/1.73 square meter N18.30    Asbestosis ([address] J61    Acute exacerbation of chronic obstructive pulmonary disease (COPD) ([facility] [address] J44.1    Debility R53.81    Paroxysmal atrial fibrillation ([facility]) I48.0    Chronic respiratory failure with hypoxia ([facility]) J96.11    Tobacco dependence F17.200    Hyponatremia E87.1    Pleural effusion, right J90    COPD with acute exacerbation ([address] J44.1    Acute respiratory failure with hypoxia and hypercarbia ([facility]) J96.01, J96.02    Hyponatremia E87.1    Advanced care planning/counseling discussion Z71.89    Hypokalemia E87.6    COPD (chronic obstructive pulmonary disease) ([facility]) J44.9    CAP (community acquired pneumonia) J18.9    Respiratory distress R06.03    COPD exacerbation ([facility]) J44.1    Atrial fibrillation with RVR ([facility]) I48.91             66 y.o. male with chronic respiratory failure, COPD, PAF, HTN, presents to ER with concerns of SOB for the past several days. Acute on Chronic respiratory failure - continue with home oxygen at 2L NC     COPD exacerbation - start on Intravenous steroids. Inhaled short acting beta 2 agonist and anticholinergics. Empiric antibiotics.     PAF -   NSR     HTN -   controlled, monitor BP     CKD -   Stage 2, monitor renal function.     Hyponatremia -  Follow sodium levels     DVT prophylaxis with lovenox    Disposition : 1-2 days    Review of systems  General: No fevers or chills. Cardiovascular: No chest pain or pressure. No palpitations.    Pulmonary: No shortness of breath. Gastrointestinal: No nausea, vomiting. Physical Exam:  General: Awake, cooperative, no acute distress    HEENT: NC, Atraumatic. PERRLA, anicteric sclerae. Lungs: CTA Bilaterally. No Wheezing/Rhonchi/Rales. Heart:  S1 S2,  No murmur, No Rubs, No Gallops  Abdomen: Soft, Non distended, Non tender.  +Bowel sounds,   Extremities: No c/c/e  Psych:   Not anxious or agitated. Neurologic:  No acute neurological deficit. Vital signs/Intake and Output:  Visit Vitals  /71 (BP 1 Location: Right upper arm, BP Patient Position: At rest)   Pulse 94   Temp 98.2 °F (36.8 °C)   Resp 24   Ht 5' 8\" (1.727 m)   Wt 100.2 kg (221 lb)   SpO2 98%   BMI 33.60 kg/m²     Current Shift:  No intake/output data recorded. Last three shifts:  02/02 0701 - 02/03 1900  In: 1400 [P.O.:1400]  Out: 1750 [Urine:1750]            Labs: Results:       Chemistry Recent Labs     02/03/21  0730 02/02/21  0524   * 123*   * 131*   K 4.7 4.0   CL 97* 97*   CO2 24 27   BUN 23* 16   CREA 1.16 1.17   CA 8.8 8.3*   AGAP 9 7   BUCR 20 14   AP  --  101   TP  --  6.3*   ALB  --  3.1*   GLOB  --  3.2   AGRAT  --  1.0      CBC w/Diff Recent Labs     02/03/21  0730 02/02/21  0524   WBC 9.8 10.4   RBC 3.71* 3.66*   HGB 10.5* 10.4*   HCT 32.2* 31.8*    357   GRANS  --  75*   LYMPH  --  6*   EOS  --  10*      Cardiac Enzymes Recent Labs     02/02/21  0524      CKND1 5.1*      Coagulation No results for input(s): PTP, INR, APTT, INREXT in the last 72 hours. Lipid Panel No results found for: CHOL, CHOLPOCT, CHOLX, CHLST, CHOLV, 840966, HDL, HDLP, LDL, LDLC, DLDLP, 623129, VLDLC, VLDL, TGLX, TRIGL, TRIGP, TGLPOCT, CHHD, CHHDX   BNP No results for input(s): BNPP in the last 72 hours.    Liver Enzymes Recent Labs     02/02/21  0524   TP 6.3*   ALB 3.1*         Thyroid Studies Lab Results   Component Value Date/Time    TSH 0.86 10/20/2019 01:05 AM        Procedures/imaging: see electronic medical records for all procedures/Xrays and details which were not copied into this note but were reviewed prior to creation of Plan

## 2021-02-04 NOTE — ROUTINE PROCESS
Assume care of patient from [facility]. Patient received in bed awake. Patient A&Ox4, denies pain and discomfort. No distress noted. Bed locked in low position. Call bell within reach and patient verbalized understanding of use for assistance and needs. 4380- Bedside and Verbal shift change report given to CAITLIN Tan (oncoming nurse) by Peggy Russ RN (offgoing nurse). Report included the following information SBAR, Kardex, Intake/Output, MAR and Recent Results. [facility] Night Shift Chart Audit    Chart Audit completed?  YES

## 2021-02-04 NOTE — PROGRESS NOTES
Problem: Falls - Risk of  Goal: *Absence of Falls  Description: Document Aneeshmon Juan Mman Fall Risk and appropriate interventions in the flowsheet. Outcome: Progressing Towards Goal  Note: Fall Risk Interventions:  Mobility Interventions: Bed/chair exit alarm, Communicate number of staff needed for ambulation/transfer, Patient to call before getting OOB, Utilize walker, cane, or other assistive device         Medication Interventions: Bed/chair exit alarm, Patient to call before getting OOB, Teach patient to arise slowly    Elimination Interventions: Bed/chair exit alarm, Call light in reach, Patient to call for help with toileting needs, Toilet paper/wipes in reach, Toileting schedule/hourly rounds              Problem: Patient Education: Go to Patient Education Activity  Goal: Patient/Family Education  Outcome: Progressing Towards Goal     Problem: Pressure Injury - Risk of  Goal: *Prevention of pressure injury  Description: Document Nikos Scale and appropriate interventions in the flowsheet. Outcome: Progressing Towards Goal  Note: Pressure Injury Interventions:             Activity Interventions: Pressure redistribution bed/mattress(bed type), Increase time out of bed, PT/OT evaluation    Mobility Interventions: Pressure redistribution bed/mattress (bed type), HOB 30 degrees or less, PT/OT evaluation    Nutrition Interventions: Document food/fluid/supplement intake, Offer support with meals,snacks and hydration    Friction and Shear Interventions: Apply protective barrier, creams and emollients, HOB 30 degrees or less                Problem: Patient Education: Go to Patient Education Activity  Goal: Patient/Family Education  Outcome: Progressing Towards Goal     Problem: Chronic Obstructive Pulmonary Disease (COPD)  Goal: *Oxygen saturation during activity within specified parameters  Outcome: Progressing Towards Goal  Goal: *Able to remain out of bed as prescribed  Outcome: Progressing Towards Goal  Goal: *Absence of hypoxia  Outcome: Progressing Towards Goal  Goal: *Optimize nutritional status  Outcome: Progressing Towards Goal     Problem: Patient Education: Go to Patient Education Activity  Goal: Patient/Family Education  Outcome: Progressing Towards Goal     Problem: General Medical Care Plan  Goal: *Vital signs within specified parameters  Outcome: Progressing Towards Goal  Goal: *Labs within defined limits  Outcome: Progressing Towards Goal  Goal: *Absence of infection signs and symptoms  Outcome: Progressing Towards Goal  Goal: *Optimal pain control at patient's stated goal  Outcome: Progressing Towards Goal  Goal: *Skin integrity maintained  Outcome: Progressing Towards Goal  Goal: *Fluid volume balance  Outcome: Progressing Towards Goal  Goal: *Optimize nutritional status  Outcome: Progressing Towards Goal  Goal: *Anxiety reduced or absent  Outcome: Progressing Towards Goal  Goal: *Progressive mobility and function (eg: ADL's)  Outcome: Progressing Towards Goal     Problem: Patient Education: Go to Patient Education Activity  Goal: Patient/Family Education  Outcome: Progressing Towards Goal     Problem: Gas Exchange - Impaired  Goal: *Absence of hypoxia  Outcome: Progressing Towards Goal     Problem: Patient Education: Go to Patient Education Activity  Goal: Patient/Family Education  Outcome: Progressing Towards Goal

## 2021-02-04 NOTE — PROGRESS NOTES
0720: Assumed patient care from off going nurse Antoinette Vargas RN     1000: This nurse received call from ultrasound regarding patients Thoracentesis scheduled for today. This nurse was unaware lovenox should not be given 12 hrs before procedure. Procedure postponed till tomorrow. Will inform night shift nurse to inform tomorrows dayshift nurse to hold lovenox.

## 2021-02-05 LAB
ANION GAP SERPL CALC-SCNC: 8 MMOL/L (ref 3–18)
BUN SERPL-MCNC: 33 MG/DL (ref 7–18)
BUN/CREAT SERPL: 29 (ref 12–20)
CALCIUM SERPL-MCNC: 8.1 MG/DL (ref 8.5–10.1)
CHLORIDE SERPL-SCNC: 98 MMOL/L (ref 100–111)
CO2 SERPL-SCNC: 28 MMOL/L (ref 21–32)
CREAT SERPL-MCNC: 1.12 MG/DL (ref 0.6–1.3)
ERYTHROCYTE [DISTWIDTH] IN BLOOD BY AUTOMATED COUNT: 13.3 % (ref 11.6–14.5)
GLUCOSE BLD STRIP.AUTO-MCNC: 128 MG/DL (ref 70–110)
GLUCOSE BLD STRIP.AUTO-MCNC: 131 MG/DL (ref 70–110)
GLUCOSE BLD STRIP.AUTO-MCNC: 150 MG/DL (ref 70–110)
GLUCOSE BLD STRIP.AUTO-MCNC: 210 MG/DL (ref 70–110)
GLUCOSE SERPL-MCNC: 132 MG/DL (ref 74–99)
HCT VFR BLD AUTO: 31.2 % (ref 36–48)
HGB BLD-MCNC: 10 G/DL (ref 13–16)
MCH RBC QN AUTO: 28.1 PG (ref 24–34)
MCHC RBC AUTO-ENTMCNC: 32.1 G/DL (ref 31–37)
MCV RBC AUTO: 87.6 FL (ref 74–97)
PLATELET # BLD AUTO: 358 K/UL (ref 135–420)
PMV BLD AUTO: 8.5 FL (ref 9.2–11.8)
POTASSIUM SERPL-SCNC: 4.4 MMOL/L (ref 3.5–5.5)
RBC # BLD AUTO: 3.56 M/UL (ref 4.7–5.5)
SODIUM SERPL-SCNC: 134 MMOL/L (ref 136–145)
WBC # BLD AUTO: 7.4 K/UL (ref 4.6–13.2)

## 2021-02-05 PROCEDURE — 82962 GLUCOSE BLOOD TEST: CPT

## 2021-02-05 PROCEDURE — 94640 AIRWAY INHALATION TREATMENT: CPT

## 2021-02-05 PROCEDURE — 36415 COLL VENOUS BLD VENIPUNCTURE: CPT

## 2021-02-05 PROCEDURE — 85027 COMPLETE CBC AUTOMATED: CPT

## 2021-02-05 PROCEDURE — 65660000000 HC RM CCU STEPDOWN

## 2021-02-05 PROCEDURE — 80048 BASIC METABOLIC PNL TOTAL CA: CPT

## 2021-02-05 PROCEDURE — 94760 N-INVAS EAR/PLS OXIMETRY 1: CPT

## 2021-02-05 PROCEDURE — 74011000250 HC RX REV CODE- 250: Performed by: HOSPITALIST

## 2021-02-05 PROCEDURE — 74011250636 HC RX REV CODE- 250/636: Performed by: HOSPITALIST

## 2021-02-05 PROCEDURE — 77010033678 HC OXYGEN DAILY

## 2021-02-05 PROCEDURE — 74011636637 HC RX REV CODE- 636/637: Performed by: HOSPITALIST

## 2021-02-05 PROCEDURE — 74011250637 HC RX REV CODE- 250/637: Performed by: HOSPITALIST

## 2021-02-05 RX ADMIN — IPRATROPIUM BROMIDE AND ALBUTEROL SULFATE 3 ML: .5; 3 SOLUTION RESPIRATORY (INHALATION) at 04:24

## 2021-02-05 RX ADMIN — INSULIN LISPRO 2 UNITS: 100 INJECTION, SOLUTION INTRAVENOUS; SUBCUTANEOUS at 06:42

## 2021-02-05 RX ADMIN — FUROSEMIDE 20 MG: 20 TABLET ORAL at 10:20

## 2021-02-05 RX ADMIN — IPRATROPIUM BROMIDE AND ALBUTEROL SULFATE 3 ML: .5; 3 SOLUTION RESPIRATORY (INHALATION) at 20:35

## 2021-02-05 RX ADMIN — TAMSULOSIN HYDROCHLORIDE 0.4 MG: 0.4 CAPSULE ORAL at 18:25

## 2021-02-05 RX ADMIN — ARFORMOTEROL TARTRATE 15 MCG: 15 SOLUTION RESPIRATORY (INHALATION) at 07:23

## 2021-02-05 RX ADMIN — INSULIN LISPRO 4 UNITS: 100 INJECTION, SOLUTION INTRAVENOUS; SUBCUTANEOUS at 18:24

## 2021-02-05 RX ADMIN — METHYLPREDNISOLONE SODIUM SUCCINATE 40 MG: 40 INJECTION, POWDER, FOR SOLUTION INTRAMUSCULAR; INTRAVENOUS at 21:57

## 2021-02-05 RX ADMIN — IPRATROPIUM BROMIDE AND ALBUTEROL SULFATE 3 ML: .5; 3 SOLUTION RESPIRATORY (INHALATION) at 00:34

## 2021-02-05 RX ADMIN — BUDESONIDE 500 MCG: 0.5 INHALANT RESPIRATORY (INHALATION) at 20:35

## 2021-02-05 RX ADMIN — IPRATROPIUM BROMIDE AND ALBUTEROL SULFATE 3 ML: .5; 3 SOLUTION RESPIRATORY (INHALATION) at 07:23

## 2021-02-05 RX ADMIN — IPRATROPIUM BROMIDE AND ALBUTEROL SULFATE 3 ML: .5; 3 SOLUTION RESPIRATORY (INHALATION) at 15:01

## 2021-02-05 RX ADMIN — BUDESONIDE 500 MCG: 0.5 INHALANT RESPIRATORY (INHALATION) at 07:23

## 2021-02-05 RX ADMIN — ARFORMOTEROL TARTRATE 15 MCG: 15 SOLUTION RESPIRATORY (INHALATION) at 20:35

## 2021-02-05 RX ADMIN — METHYLPREDNISOLONE SODIUM SUCCINATE 40 MG: 40 INJECTION, POWDER, FOR SOLUTION INTRAMUSCULAR; INTRAVENOUS at 18:27

## 2021-02-05 RX ADMIN — METHYLPREDNISOLONE SODIUM SUCCINATE 40 MG: 40 INJECTION, POWDER, FOR SOLUTION INTRAMUSCULAR; INTRAVENOUS at 06:42

## 2021-02-05 RX ADMIN — IPRATROPIUM BROMIDE AND ALBUTEROL SULFATE 3 ML: .5; 3 SOLUTION RESPIRATORY (INHALATION) at 11:15

## 2021-02-05 NOTE — PROGRESS NOTES
Hospitalist Progress Note    Patient: Sb Valdes MRN: [id number]  [facility]: [id number]    YOB: 1945  Age: 66 y.o. Sex: male    DOA: 2/2/2021 LOS:  LOS: 3 days                Assessment/Plan     Patient Active Problem List   Diagnosis Code    Hypertension I10    COPD (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 ([facility]) J44.9    Chronic renal disease, stage 3, moderately decreased glomerular filtration rate between 30-59 mL/min/1.73 square meter N18.30    Asbestosis ([address] J61    Acute exacerbation of chronic obstructive pulmonary disease (COPD) ([facility] J44.1    Debility R53.81    Paroxysmal atrial fibrillation ([facility]) I48.0    Chronic respiratory failure with hypoxia ([facility]) J96.11    Tobacco dependence F17.200    Hyponatremia E87.1    Pleural effusion, right J90    COPD with acute exacerbation ([address] J44.1    Acute respiratory failure with hypoxia and hypercarbia ([facility]) J96.01, J96.02    Hyponatremia E87.1    Advanced care planning/counseling discussion Z71.89    Hypokalemia E87.6    COPD (chronic obstructive pulmonary disease) ([facility]) J44.9    CAP (community acquired pneumonia) J18.9    Respiratory distress R06.03    COPD exacerbation ([facility]) J44.1    Atrial fibrillation with RVR ([facility]) I48.91             66 y.o. male with chronic respiratory failure, COPD, PAF, HTN, presents to ER with concerns of SOB for the past several days. Acute on Chronic respiratory failure - continue with home oxygen at 2L NC     COPD exacerbation - start on Intravenous steroids. Inhaled short acting beta 2 agonist and anticholinergics. Empiric antibiotics.     PAF -   NSR     HTN -   controlled, monitor BP     CKD -   Stage 2, monitor renal function.     Hyponatremia -  improving  Follow sodium levels     DVT prophylaxis with lovenox    Disposition : 1-2 days    Review of systems  General: No fevers or chills. Cardiovascular: No chest pain or pressure. No palpitations.    Pulmonary: No shortness of breath. Gastrointestinal: No nausea, vomiting. Physical Exam:  General: Awake, cooperative, no acute distress    HEENT: NC, Atraumatic. PERRLA, anicteric sclerae. Lungs: CTA Bilaterally. No Wheezing/Rhonchi/Rales. Heart:  S1 S2,  No murmur, No Rubs, No Gallops  Abdomen: Soft, Non distended, Non tender.  +Bowel sounds,   Extremities: No c/c/e  Psych:   Not anxious or agitated. Neurologic:  No acute neurological deficit. Vital signs/Intake and Output:  Visit Vitals  BP (!) 165/97   Pulse 90   Temp 97.4 °F (36.3 °C)   Resp 17   Ht 5' 8\" (1.727 m)   Wt 100.2 kg (221 lb)   SpO2 97%   BMI 33.60 kg/m²     Current Shift:  No intake/output data recorded. Last three shifts:  02/03 1901 - 02/05 0700  In: 900 [P.O.:900]  Out: 850 [Urine:850]            Labs: Results:       Chemistry Recent Labs     02/05/21  0402 02/04/21  0205 02/03/21  0730   * 139* 123*   * 132* 130*   K 4.4 4.7 4.7   CL 98* 97* 97*   CO2 28 26 24   BUN 33* 28* 23*   CREA 1.12 1.15 1.16   CA 8.1* 8.6 8.8   AGAP 8 9 9   BUCR 29* 24* 20      CBC w/Diff Recent Labs     02/05/21  0402 02/04/21  0205 02/03/21  0730   WBC 7.4 8.8 9.8   RBC 3.56* 3.53* 3.71*   HGB 10.0* 10.1* 10.5*   HCT 31.2* 31.0* 32.2*    380 410      Cardiac Enzymes No results for input(s): CPK, CKND1, WILLARD in the last 72 hours. No lab exists for component: CKRMB, TROIP   Coagulation No results for input(s): PTP, INR, APTT, INREXT, INREXT in the last 72 hours. Lipid Panel No results found for: CHOL, CHOLPOCT, CHOLX, CHLST, CHOLV, 200435, HDL, HDLP, LDL, LDLC, DLDLP, 688303, VLDLC, VLDL, TGLX, TRIGL, TRIGP, TGLPOCT, CHHD, CHHDX   BNP No results for input(s): BNPP in the last 72 hours. Liver Enzymes No results for input(s): TP, ALB, TBIL, AP in the last 72 hours.     No lab exists for component: SGOT, GPT, DBIL   Thyroid Studies Lab Results   Component Value Date/Time    TSH 0.86 10/20/2019 01:05 AM        Procedures/imaging: see electronic medical records for all procedures/Xrays and details which were not copied into this note but were reviewed prior to creation of Plan

## 2021-02-05 NOTE — DISCHARGE INSTRUCTIONS
Patient Education        Breathing Techniques for COPD: Care Instructions  Your Care Instructions     Breathing is hard when you have chronic obstructive pulmonary disease (COPD). You may take quick, short breaths. Breathing this way makes it harder to get air into your lungs. Learning new ways to control your breathing may help. You may feel better and be able to do more. You can try three basic ways to control your breathing. They are pursed-lip breathing, diaphragmatic breathing, and breathing while bending. Use these methods when you are more short of breath than normal. Practice them often so you can do them well. Follow-up care is a key part of your treatment and safety. Be sure to make and go to all appointments, and call your doctor if you are having problems. It's also a good idea to know your test results and keep a list of the medicines you take. How can you care for yourself at home? · Pursed-lip breathing helps you breathe more air out so that your next breath can be deeper. For this type of breathing, you breathe in through your nose and out through your mouth while almost closing your lips. Breathe in for about 2 seconds, and breathe out for 4 to 6 seconds. Pursed-lip breathing decreases shortness of breath and improves your ability to exercise. · Diaphragmatic breathing helps your lungs expand so that they take in more air. ? Lie on your back, or prop yourself up on several pillows. ? Put one hand on your belly and the other on your chest. When you breathe in, push your belly out as far as possible. You should feel the hand on your belly move out, while the hand on your chest does not move. ? When you breathe out, you should feel the hand on your belly move in. When you can do this type of breathing well while lying down, learn to do it while sitting or standing. Many people with COPD find this breathing method helpful.   ? Practice diaphragmatic breathing for 20 minutes, 2 or 3 times a day.  · Breathing while bending forward at the waist may make breathing easier. It can reduce shortness of breath while you exercise or rest. It helps the diaphragm move more easily. The diaphragm is a large muscle that separates your lungs from your belly. It helps draw air into your lungs as you breathe. When should you call for help? Call your doctor now or seek immediate medical care if:    · Your breathing methods do not help.     · Your shortness of breath gets worse.     · You cough up blood.     · You have swelling in your belly and legs.     · You have severe chest pain. Watch closely for changes in your health, and be sure to contact your doctor if you have any problems. Where can you learn more? Go to http://www.gray.com/  Enter [id number] in the search box to learn more about \"Breathing Techniques for COPD: Care Instructions. \"  Current as of: February 24, 2020               Content Version: 12.6  © 7443-1983 [employer]. Care instructions adapted under license by [employer] (which disclaims liability or warranty for this information). If you have questions about a medical condition or this instruction, always ask your healthcare professional. [place] [address] any warranty or liability for your use of this information.

## 2021-02-05 NOTE — ROUTINE PROCESS
Bedside and Verbal shift change report given to Rosa M Gage  (oncoming nurse) by Zeeshan Martinez  (offgoing nurse). Report included the following information SBAR, Kardex, Procedure Summary, Intake/Output, MAR and Recent Results.

## 2021-02-05 NOTE — PROGRESS NOTES
Care Management Update: Home with family assistance and MD follow-up    CM met with the patient at the bedside to discuss discharge plans. The patient states that his wife will be able to pick him up and take him home upon discharge. The patient confirms that he was on 2L of oxygen at home prior to this admission and when CM inquired about potential home health upon discharge the patient states \"no home health! \". CM also inquired about the patient's situation at home as documentation indicates that he has mold at home. CM offered resources for mold erradication and the patient declined, stating that he plans to use his stimulus check to pay for the mold to get removed. No immediate CM needs at this time, CM to follow the patient's progress and be available to assist as needed. Care Management Interventions  PCP Verified by CM: Yes  Mode of Transport at Discharge:  Other (see comment)(wife)  Transition of Care Consult (CM Consult): Discharge Planning  Current Support Network: Lives with Spouse  Confirm Follow Up Transport: Family  The Plan for Transition of Care is Related to the Following Treatment Goals : home when medically stable  The Patient and/or Patient Representative was Provided with a Choice of Provider and Agrees with the Discharge Plan?: Yes  Name of the Patient Representative Who was Provided with a Choice of Provider and Agrees with the Discharge Plan: Mirela Cardenas, patient  Discharge Location  Discharge Placement: Home with family assistance

## 2021-02-05 NOTE — PROGRESS NOTES
Hospitalist Progress Note    Patient: Zeny Roberts MRN: [id number]  CSN: [id number]    YOB: 1945  Age: 66 y.o. Sex: male    DOA: 2/2/2021 LOS:  LOS: 2 days                Assessment/Plan     Patient Active Problem List   Diagnosis Code    Hypertension I10    COPD (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 ([facility]) J44.9    Chronic renal disease, stage 3, moderately decreased glomerular filtration rate between 30-59 mL/min/1.73 square meter N18.30    Asbestosis ([address] J61    Acute exacerbation of chronic obstructive pulmonary disease (COPD) ([facility] J44.1    Debility R53.81    Paroxysmal atrial fibrillation ([facility]) I48.0    Chronic respiratory failure with hypoxia ([facility]) J96.11    Tobacco dependence F17.200    Hyponatremia E87.1    Pleural effusion, right J90    COPD with acute exacerbation ([facility] J44.1    Acute respiratory failure with hypoxia and hypercarbia ([facility]) J96.01, J96.02    Hyponatremia E87.1    Advanced care planning/counseling discussion Z71.89    Hypokalemia E87.6    COPD (chronic obstructive pulmonary disease) ([facility]) J44.9    CAP (community acquired pneumonia) J18.9    Respiratory distress R06.03    COPD exacerbation ([facility]) J44.1    Atrial fibrillation with RVR ([facility]) I48.91             66 y.o. male with chronic respiratory failure, COPD, PAF, HTN, presents to ER with concerns of SOB for the past several days. Acute on Chronic respiratory failure - continue with home oxygen at 2L NC     COPD exacerbation - start on Intravenous steroids. Inhaled short acting beta 2 agonist and anticholinergics. Empiric antibiotics.     PAF -   NSR     HTN -   controlled, monitor BP     CKD -   Stage 2, monitor renal function.     Hyponatremia -  Follow sodium levels     DVT prophylaxis with lovenox    Disposition : 1-2 days    Review of systems  General: No fevers or chills. Cardiovascular: No chest pain or pressure. No palpitations.    Pulmonary: No shortness of breath. Gastrointestinal: No nausea, vomiting. Physical Exam:  General: Awake, cooperative, no acute distress    HEENT: NC, Atraumatic. PERRLA, anicteric sclerae. Lungs: CTA Bilaterally. No Wheezing/Rhonchi/Rales. Heart:  S1 S2,  No murmur, No Rubs, No Gallops  Abdomen: Soft, Non distended, Non tender.  +Bowel sounds,   Extremities: No c/c/e  Psych:   Not anxious or agitated. Neurologic:  No acute neurological deficit. Vital signs/Intake and Output:  Visit Vitals  BP (!) 156/69 (BP 1 Location: Left upper arm, BP Patient Position: At rest;Sitting)   Pulse 87   Temp 98.8 °F (37.1 °C)   Resp 22   Ht 5' 8\" (1.727 m)   Wt 100.2 kg (221 lb)   SpO2 99%   BMI 33.60 kg/m²     Current Shift:  No intake/output data recorded. Last three shifts:  02/03 0701 - 02/04 1900  In: 1160 [P.O.:1160]  Out: 1800 [Urine:1800]            Labs: Results:       Chemistry Recent Labs     02/04/21 0205 02/03/21 0730 02/02/21  0524   * 123* 123*   * 130* 131*   K 4.7 4.7 4.0   CL 97* 97* 97*   CO2 26 24 27   BUN 28* 23* 16   CREA 1.15 1.16 1.17   CA 8.6 8.8 8.3*   AGAP 9 9 7   BUCR 24* 20 14   AP  --   --  101   TP  --   --  6.3*   ALB  --   --  3.1*   GLOB  --   --  3.2   AGRAT  --   --  1.0      CBC w/Diff Recent Labs     02/04/21 0205 02/03/21 0730 02/02/21  0524   WBC 8.8 9.8 10.4   RBC 3.53* 3.71* 3.66*   HGB 10.1* 10.5* 10.4*   HCT 31.0* 32.2* 31.8*    410 357   GRANS  --   --  75*   LYMPH  --   --  6*   EOS  --   --  10*      Cardiac Enzymes Recent Labs     02/02/21  0524      CKND1 5.1*      Coagulation No results for input(s): PTP, INR, APTT, INREXT, INREXT in the last 72 hours. Lipid Panel No results found for: CHOL, CHOLPOCT, CHOLX, CHLST, CHOLV, 662214, HDL, HDLP, LDL, LDLC, DLDLP, 084273, VLDLC, VLDL, TGLX, TRIGL, TRIGP, TGLPOCT, CHHD, CHHDX   BNP No results for input(s): BNPP in the last 72 hours.    Liver Enzymes Recent Labs     02/02/21  0524   TP 6.3*   ALB 3.1*    Thyroid Studies Lab Results   Component Value Date/Time    TSH 0.86 10/20/2019 01:05 AM        Procedures/imaging: see electronic medical records for all procedures/Xrays and details which were not copied into this note but were reviewed prior to creation of Plan

## 2021-02-05 NOTE — PROGRESS NOTES
[facility] from [address]. Shift uneventful. 0730 Bedside and Verbal shift change report given to Josef Lipscomb RN (oncoming nurse) by Ricardo Ortiz RN   (offgoing nurse). Report included the following information SBAR, Kardex, ED Summary, Procedure Summary, Intake/Output, MAR, Recent Results and Med Rec Status.

## 2021-02-05 NOTE — ADVANCED PRACTICE NURSE
[facility]  Clinical Nurse Specialist Rounding Note      NAME: Aurora Luis  MRN: [id number]  AGE: 66 y.o., : 1945  DATE OF ADMISSION: 2021  Rounding Date and Time: 2021 4:56 PM  Attending Provider: Pansy Goltz, MD  Reason for Admission: Shortness of Breath    Primary Diagnosis: <principal problem not specified>   Code Status: Prior  Allergies: Allergies   Allergen Reactions    Aspirin Other (comments)     \"messes my stomach up\"       Assessment/Plan:  1. COPD. Grunting, dyspneic at speaking. No longer wheezing, no crackles. Still able to ambulate to restroom & eat despite dyspnea. On oxygen 2L/min via nasal cannula. Feels much better today, and states that his mucus is also improving.   a. Inpt medications: Duo-neb, Brovana, Pulmicort, Solu-medrol. b. Outpt medications: only reports using albuterol nebulizer, ventolin inhaler, and one other inhaler that he says is prescribed by Dr. Pedro Luis Morris. i. Ankit Forrest at home - reports that it is working correctly. ii. Inhalers - seem only to help \"to a point. \"  iii. Removed from PTA med list Brovana and Pulmicort as pt reports that he does not use nebulizers except ipratropium-albuterol (Duo-neb). c. Oxygen Therapy- baseline 2L/min at all times. i. Maintain between O2 saturation 88% to 94%  d. Incentive spirometry if he can tolerate 10 reps/hour.  e. Follow-up: PCP is Dr. Latha Angulo, pulmonologist is Dr. Sandra Drew. i. States that he hasn't seen Dr. Pedro Luis Morris in a few months. Needs follow up for 6 months. ii. Home environment - Mold? 1. He wasn't able to get his house cleaned of mold since last August.   f. Rec: Taper down Solu-medrol. Needs follow-up with Dr. Pedro Luis Morris after admission. Limited mobility and increased SoB contribute to decreased quality of life - pt understands this about COPD. CM to assist to make appointment with Dr. Pedro Luis Morris. Is there a way to help him clean his home?      2. Leg pain - neuropathy vs DVT?  Numbness present in both feet, but this discomfort is speciifically on his left side. Localizes from knee to hip. Slowly worse during last couple of months. Described as \"as if my leg locks up and throws me on the floor. \" Edema is better, though left extremity is still somewhat more pink. Denies being diabetic. He has a duplex from 6/2020 that shows no DVT. Denies having arthritis. a. DVT - Discussed with Dr. George Kilgore, who explains that it is less likely due to position on knee and hip.   b. DVT prophylaxis - lovenox currently. i. Does not take an anticoagulant at home. c. Rec: Consider left leg side duplex ultrasound if suspicious for DVT. Physical therapy consult for left leg and general mobility.      3. Care transition of older adult - grunting and difficulty breathing with COPD.   a. Code status (order not active): ACP documents on chart indicate from August 2020. Discussed with Palliative Care  GREGORIO Her who also indicates that his current code should be DNR.   b. Nutrition - eating well, despite his food being cold. c. Mobility - able to get up from bed with minimal assistance and a walker   d. Skin - intact  e. Elimination - voids frequently (on lasix) and has been regular BM daily. f. Sensory - Extremely hard of hearing despite having hearing aids. He hears better on his left side. g. Rec: Needs DNR order. Subjective:    Chief Complaint: SoB  Verbatim: \"Feelling a lot better today! \"    Feels that his breathing is better - attributes it to breathing treatments  Better spirits and eating well  Had BM yesterday and is voiding  Is hopeful for a discharge tomorrow     Hx:   Past Medical History:   Diagnosis Date    Brain aneurysm     Cancer ([address]     prostate    COPD (chronic obstructive pulmonary disease) ([address]     Hypertension     Nocturia     Pneumonia     Radiation effect           Objective:    Physical Assessment:    General: Alert and no distress.  Oriented to Person, Place, Time and Situation    HEENT: PERRLA. Hard of hearing bilat. Respiratory: Diminished bilaterally. No wheezing, no crackles. Dyspnea when speaking. Cardiac: HR 76 NSR on telemetry monitor. No CP. GI: Bowel sounds active. : Void status is voiding well without difficulty  Skin: intact   Extremities: BLE +2 edema, pink. Sensation intact. BUE without edema.      Vitals:  Patient Vitals for the past 12 hrs:   Temp Pulse Resp BP SpO2   02/05/21 1149 97.4 °F (36.3 °C) 90 17 (!) 165/97 97 %   02/05/21 0723 -- -- -- -- 98 %        Pertinent labs:  Recent Results (from the past 12 hour(s))   GLUCOSE, POC    Collection Time: 02/05/21  6:13 AM   Result Value Ref Range    Glucose (POC) 150 (H) 70 - 110 mg/dL   GLUCOSE, POC    Collection Time: 02/05/21 11:47 AM   Result Value Ref Range    Glucose (POC) 131 (H) 70 - 110 mg/dL           Current Facility-Administered Medications:     furosemide (LASIX) tablet 20 mg, 20 mg, Oral, DAILY, Monika Sierra MD, 20 mg at 02/05/21 1020    tamsulosin (FLOMAX) capsule 0.4 mg, 0.4 mg, Oral, QPM, Monika Sierra MD, 0.4 mg at 02/04/21 1717    glucose chewable tablet 16 g, 16 g, Oral, PRN, Monika Bee MD    glucagon (GLUCAGEN) injection 1 mg, 1 mg, IntraMUSCular, PRN, Monika Bee MD    insulin lispro (HUMALOG) injection, , SubCUTAneous, AC&HS, Tera Pak MD, Stopped at 02/05/21 1130    dextrose 10% infusion 125-250 mL, 125-250 mL, IntraVENous, PRN, Monika Bee MD    albuterol-ipratropium (DUO-NEB) 2.5 MG-0.5 MG/3 ML, 3 mL, Nebulization, Q4H RT, Monika Sierra MD, 3 mL at 02/05/21 1501    methylPREDNISolone (PF) (SOLU-MEDROL) injection 40 mg, 40 mg, IntraVENous, Q8H, Monika Sierra MD, 40 mg at 02/05/21 0642    enoxaparin (LOVENOX) injection 40 mg, 40 mg, SubCUTAneous, Q24H, Monika Sierra MD, 40 mg at 02/04/21 1327    arformoteroL (BROVANA) neb solution 15 mcg, 15 mcg, Nebulization, BID RT, Tera Pak MD, 15 mcg at 02/05/21 0909   budesonide (PULMICORT) 500 mcg/2 ml nebulizer suspension, 500 mcg, Nebulization, BID RT, Katherin Mccoy MD, 500 mcg at 02/05/21 0140          Taty Gama, MSN, RN, [facility]    Clinical Nurse Specialist  [address]  [facility]  Office: [phone] [address]: [phone]

## 2021-02-05 NOTE — PROGRESS NOTES
Problem: Falls - Risk of  Goal: *Absence of Falls  Description: Document Hilton Arthur Fall Risk and appropriate interventions in the flowsheet.   Outcome: Progressing Towards Goal  Note: Fall Risk Interventions:  Mobility Interventions: Assess mobility with egress test, Patient to call before getting OOB, PT Consult for mobility concerns, Utilize walker, cane, or other assistive device         Medication Interventions: Evaluate medications/consider consulting pharmacy, Patient to call before getting OOB, Teach patient to arise slowly    Elimination Interventions: Call light in reach, Patient to call for help with toileting needs, Urinal in reach              Problem: Chronic Obstructive Pulmonary Disease (COPD)  Goal: *Oxygen saturation during activity within specified parameters  Outcome: Progressing Towards Goal  Goal: *Able to remain out of bed as prescribed  Outcome: Progressing Towards Goal  Goal: *Absence of hypoxia  Outcome: Progressing Towards Goal  Goal: *Optimize nutritional status  Outcome: Progressing Towards Goal

## 2021-02-06 LAB
GLUCOSE BLD STRIP.AUTO-MCNC: 135 MG/DL (ref 70–110)
GLUCOSE BLD STRIP.AUTO-MCNC: 142 MG/DL (ref 70–110)
GLUCOSE BLD STRIP.AUTO-MCNC: 143 MG/DL (ref 70–110)

## 2021-02-06 PROCEDURE — 77010033678 HC OXYGEN DAILY

## 2021-02-06 PROCEDURE — 94760 N-INVAS EAR/PLS OXIMETRY 1: CPT

## 2021-02-06 PROCEDURE — 74011250636 HC RX REV CODE- 250/636: Performed by: INTERNAL MEDICINE

## 2021-02-06 PROCEDURE — 82962 GLUCOSE BLOOD TEST: CPT

## 2021-02-06 PROCEDURE — 74011250637 HC RX REV CODE- 250/637: Performed by: HOSPITALIST

## 2021-02-06 PROCEDURE — 65660000000 HC RM CCU STEPDOWN

## 2021-02-06 PROCEDURE — 74011000250 HC RX REV CODE- 250: Performed by: HOSPITALIST

## 2021-02-06 PROCEDURE — 97162 PT EVAL MOD COMPLEX 30 MIN: CPT

## 2021-02-06 PROCEDURE — 74011250636 HC RX REV CODE- 250/636: Performed by: HOSPITALIST

## 2021-02-06 PROCEDURE — 94640 AIRWAY INHALATION TREATMENT: CPT

## 2021-02-06 RX ADMIN — FUROSEMIDE 20 MG: 20 TABLET ORAL at 09:19

## 2021-02-06 RX ADMIN — IPRATROPIUM BROMIDE AND ALBUTEROL SULFATE 3 ML: .5; 3 SOLUTION RESPIRATORY (INHALATION) at 20:08

## 2021-02-06 RX ADMIN — BUDESONIDE 500 MCG: 0.5 INHALANT RESPIRATORY (INHALATION) at 07:41

## 2021-02-06 RX ADMIN — ARFORMOTEROL TARTRATE 15 MCG: 15 SOLUTION RESPIRATORY (INHALATION) at 07:41

## 2021-02-06 RX ADMIN — IPRATROPIUM BROMIDE AND ALBUTEROL SULFATE 3 ML: .5; 3 SOLUTION RESPIRATORY (INHALATION) at 04:13

## 2021-02-06 RX ADMIN — BUDESONIDE 500 MCG: 0.5 INHALANT RESPIRATORY (INHALATION) at 20:08

## 2021-02-06 RX ADMIN — IPRATROPIUM BROMIDE AND ALBUTEROL SULFATE 3 ML: .5; 3 SOLUTION RESPIRATORY (INHALATION) at 07:41

## 2021-02-06 RX ADMIN — IPRATROPIUM BROMIDE AND ALBUTEROL SULFATE 3 ML: .5; 3 SOLUTION RESPIRATORY (INHALATION) at 01:22

## 2021-02-06 RX ADMIN — METHYLPREDNISOLONE SODIUM SUCCINATE 40 MG: 40 INJECTION, POWDER, FOR SOLUTION INTRAMUSCULAR; INTRAVENOUS at 21:57

## 2021-02-06 RX ADMIN — TAMSULOSIN HYDROCHLORIDE 0.4 MG: 0.4 CAPSULE ORAL at 17:07

## 2021-02-06 RX ADMIN — METHYLPREDNISOLONE SODIUM SUCCINATE 40 MG: 40 INJECTION, POWDER, FOR SOLUTION INTRAMUSCULAR; INTRAVENOUS at 06:41

## 2021-02-06 RX ADMIN — ARFORMOTEROL TARTRATE 15 MCG: 15 SOLUTION RESPIRATORY (INHALATION) at 20:08

## 2021-02-06 RX ADMIN — METHYLPREDNISOLONE SODIUM SUCCINATE 40 MG: 40 INJECTION, POWDER, FOR SOLUTION INTRAMUSCULAR; INTRAVENOUS at 17:07

## 2021-02-06 RX ADMIN — IPRATROPIUM BROMIDE AND ALBUTEROL SULFATE 3 ML: .5; 3 SOLUTION RESPIRATORY (INHALATION) at 13:14

## 2021-02-06 NOTE — PROGRESS NOTES
Problem: Falls - Risk of  Goal: *Absence of Falls  Description: Document Ethan Sandy Fall Risk and appropriate interventions in the flowsheet. Outcome: Progressing Towards Goal  Note: Fall Risk Interventions:  Mobility Interventions: Communicate number of staff needed for ambulation/transfer, Patient to call before getting OOB, Utilize walker, cane, or other assistive device         Medication Interventions: Teach patient to arise slowly, Patient to call before getting OOB    Elimination Interventions: Call light in reach, Patient to call for help with toileting needs              Problem: Pressure Injury - Risk of  Goal: *Prevention of pressure injury  Description: Document Nikos Scale and appropriate interventions in the flowsheet. Outcome: Progressing Towards Goal  Note: Pressure Injury Interventions:             Activity Interventions: Pressure redistribution bed/mattress(bed type), Increase time out of bed    Mobility Interventions: Pressure redistribution bed/mattress (bed type), HOB 30 degrees or less    Nutrition Interventions: Document food/fluid/supplement intake, Offer support with meals,snacks and hydration    Friction and Shear Interventions: HOB 30 degrees or less, Transferring/repositioning devices                Problem: Chronic Obstructive Pulmonary Disease (COPD)  Goal: *Oxygen saturation during activity within specified parameters  Outcome: Progressing Towards Goal  Goal: *Able to remain out of bed as prescribed  Outcome: Progressing Towards Goal  Goal: *Absence of hypoxia  Outcome: Progressing Towards Goal  Goal: *Optimize nutritional status  Outcome: Progressing Towards Goal     Problem: Patient Education: Go to Patient Education Activity  Goal: Patient/Family Education  Outcome: Progressing Towards Goal

## 2021-02-06 NOTE — PROGRESS NOTES
Hospitalist Progress Note    Patient: Zeny Roberts MRN: [id number]  CSN: [id number]    YOB: 1945  Age: 66 y.o. Sex: male    DOA: 2/2/2021 LOS:  LOS: 4 days                Assessment/Plan     Patient Active Problem List   Diagnosis Code    Hypertension I10    COPD (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 ([facility]) J44.9    Chronic renal disease, stage 3, moderately decreased glomerular filtration rate between 30-59 mL/min/1.73 square meter N18.30    Asbestosis ([address] J61    Acute exacerbation of chronic obstructive pulmonary disease (COPD) ([address] J44.1    Debility R53.81    Paroxysmal atrial fibrillation ([facility]) I48.0    Chronic respiratory failure with hypoxia ([facility]) J96.11    Tobacco dependence F17.200    Hyponatremia E87.1    Pleural effusion, right J90    COPD with acute exacerbation ([facility] [address] J44.1    Acute respiratory failure with hypoxia and hypercarbia ([facility]) J96.01, J96.02    Hyponatremia E87.1    Advanced care planning/counseling discussion Z71.89    Hypokalemia E87.6    COPD (chronic obstructive pulmonary disease) ([facility]) J44.9    CAP (community acquired pneumonia) J18.9    Respiratory distress R06.03    COPD exacerbation ([facility]) J44.1    Atrial fibrillation with RVR ([facility]) I48.91             66 y.o. male with chronic respiratory failure, COPD, PAF, HTN, presents to ER with concerns of SOB for the past several days. Acute on Chronic respiratory failure - continue with home oxygen at 2L NC     COPD exacerbation - start on Intravenous steroids. Inhaled short acting beta 2 agonist and anticholinergics. Empiric antibiotics. Start PT     PAF -   NSR     HTN -   controlled, monitor BP     CKD -   Stage 2, monitor renal function.     Hyponatremia -  Follow sodium levels     DVT prophylaxis with lovenox    Disposition : 1-2 days    Review of systems  General: No fevers or chills. Cardiovascular: No chest pain or pressure. No palpitations.    Pulmonary: No shortness of breath. Gastrointestinal: No nausea, vomiting. Physical Exam:  General: Awake, cooperative, no acute distress    HEENT: NC, Atraumatic. PERRLA, anicteric sclerae. Lungs: CTA Bilaterally. No Wheezing/Rhonchi/Rales. Heart:  S1 S2,  No murmur, No Rubs, No Gallops  Abdomen: Soft, Non distended, Non tender.  +Bowel sounds,   Extremities: No c/c/e  Psych:   Not anxious or agitated. Neurologic:  No acute neurological deficit. Vital signs/Intake and Output:  Visit Vitals  BP (!) 159/74   Pulse 88   Temp 97.2 °F (36.2 °C)   Resp 22   Ht 5' 8\" (1.727 m)   Wt 100.2 kg (221 lb)   SpO2 99%   BMI 33.60 kg/m²     Current Shift:  No intake/output data recorded. Last three shifts:  02/04 1901 - 02/06 0700  In: 700 [P.O.:700]  Out: -             Labs: Results:       Chemistry Recent Labs     02/05/21  0402 02/04/21  0205   * 139*   * 132*   K 4.4 4.7   CL 98* 97*   CO2 28 26   BUN 33* 28*   CREA 1.12 1.15   CA 8.1* 8.6   AGAP 8 9   BUCR 29* 24*      CBC w/Diff Recent Labs     02/05/21  0402 02/04/21  0205   WBC 7.4 8.8   RBC 3.56* 3.53*   HGB 10.0* 10.1*   HCT 31.2* 31.0*    380      Cardiac Enzymes No results for input(s): CPK, CKND1, WILLARD in the last 72 hours. No lab exists for component: CKRMB, TROIP   Coagulation No results for input(s): PTP, INR, APTT, INREXT, INREXT in the last 72 hours. Lipid Panel No results found for: CHOL, CHOLPOCT, CHOLX, CHLST, CHOLV, 430907, HDL, HDLP, LDL, LDLC, DLDLP, 753661, VLDLC, VLDL, TGLX, TRIGL, TRIGP, TGLPOCT, CHHD, CHHDX   BNP No results for input(s): BNPP in the last 72 hours. Liver Enzymes No results for input(s): TP, ALB, TBIL, AP in the last 72 hours.     No lab exists for component: SGOT, GPT, DBIL   Thyroid Studies Lab Results   Component Value Date/Time    TSH 0.86 10/20/2019 01:05 AM        Procedures/imaging: see electronic medical records for all procedures/Xrays and details which were not copied into this note but were reviewed prior to creation of Plan

## 2021-02-06 NOTE — PROGRESS NOTES
Problem: Mobility Impaired (Adult and Pediatric)  Goal: *Acute Goals and Plan of Care (Insert Text)  Note:   PHYSICAL THERAPY EVALUATION & DISCHARGE    Patient: Kana Lyon (89 y.o. male)  Date: 2/6/2021  Primary Diagnosis: COPD exacerbation ([facility] [J44.1]  Atrial fibrillation with RVR ([facility] [I48.91]  Precautions:   Fall    ASSESSMENT AND RECOMMENDATIONS:  Based on the objective data described below, the patient presents with fairly good gross LE strength, decreased endurance, no LOB or unsteadiness during session, supervision level for bed mobility, transfers and ambulation with RW. Pt on 2L O2 via NC, which is his baseline at home. He demonstrated mild SOB with mobility however quickly recovered with sitting rest. Pt reports this often happens at home and he takes breaks. Reviewed HEP, home safety, activity recommendations, energy conservation, and fall risk prevention. Pt verbalized understanding and able to repeat back education. Pt appears at baseline level with ambulation and O2 requirements.   Discharge Recommendations: Home Health  Further Equipment Recommendations for Discharge: N/A      SUBJECTIVE:   Patient stated I feel pretty much normal.    OBJECTIVE DATA SUMMARY:     Past Medical History:   Diagnosis Date    Brain aneurysm     Cancer ([facility]     prostate    COPD (chronic obstructive pulmonary disease) ([facility]     Hypertension     Nocturia     Pneumonia     Radiation effect      Past Surgical History:   Procedure Laterality Date    HX HERNIA REPAIR       Barriers to Learning/Limitations: None  Compensate with: visual, verbal, tactile, kinesthetic cues/model  Prior Level of Function/Home Situation: Independent amb c/RW or SPC  Home Situation  Home Environment: Private residence  # Steps to Enter: 4  Rails to Enter: Yes  One/Two Story Residence: One story  Living Alone: No  Support Systems: Spouse/Significant Other/Partner  Patient Expects to be Discharged to[de-identified] Private residence  Current DME Used/Available at Home: Walker, rolling, Cane, straight  Strength:    Strength: Generally decreased, functional  Tone & Sensation:   Tone: Normal  Range Of Motion:  AROM: Generally decreased, functional  Functional Mobility:  Bed Mobility:   Supine to Sit: Supervision;Modified independent  Transfers:  Sit to Stand: Supervision  Stand to Sit: Supervision  Balance:   Sitting: Intact  Standing: Intact; With support  Ambulation/Gait Training:  Distance (ft): 100 Feet (ft)  Assistive Device: Gait belt;Walker, rolling  Ambulation - Level of Assistance: Supervision   Gait Description (WDL): Exceptions to WDL  Gait Abnormalities: Decreased step clearance  Speed/Lisbet: Slow  Step Length: Right shortened;Left shortened  Pain:  Pain Scale 1: Numeric (0 - 10)  Pain Intensity 1: 0  Activity Tolerance:   Good  Please refer to the flowsheet for vital signs taken during this treatment. After treatment:   []         Patient left in no apparent distress sitting up in chair  [x]         Patient left in no apparent distress in bed  [x]         Call bell left within reach  [x]         Nursing notified  []         Caregiver present  []         Bed alarm activated    COMMUNICATION/EDUCATION:   [x]         Fall prevention education was provided and the patient/caregiver indicated understanding. [x]         Patient/family have participated as able in goal setting and plan of care. [x]         Patient/family agree to work toward stated goals and plan of care. []         Patient understands intent and goals of therapy, but is neutral about his/her participation. []         Patient is unable to participate in goal setting and plan of care.     Thank you for this referral.  Baylee Wood   Time Calculation: 19 mins

## 2021-02-06 NOTE — PROGRESS NOTES
Bedside and Verbal shift change report given to MARKOS Bell RN (oncoming nurse) by Tabitha Snowden RN (offgoing nurse). Report included the following information SBAR, Kardex, Intake/Output, MAR, Accordion and Recent Results.

## 2021-02-07 VITALS
WEIGHT: 221 LBS | DIASTOLIC BLOOD PRESSURE: 77 MMHG | OXYGEN SATURATION: 99 % | HEIGHT: 68 IN | RESPIRATION RATE: 18 BRPM | BODY MASS INDEX: 33.49 KG/M2 | TEMPERATURE: 99.3 F | HEART RATE: 82 BPM | SYSTOLIC BLOOD PRESSURE: 172 MMHG

## 2021-02-07 LAB — GLUCOSE BLD STRIP.AUTO-MCNC: 155 MG/DL (ref 70–110)

## 2021-02-07 PROCEDURE — 77010033678 HC OXYGEN DAILY

## 2021-02-07 PROCEDURE — 82962 GLUCOSE BLOOD TEST: CPT

## 2021-02-07 PROCEDURE — 94640 AIRWAY INHALATION TREATMENT: CPT

## 2021-02-07 PROCEDURE — 74011250637 HC RX REV CODE- 250/637: Performed by: HOSPITALIST

## 2021-02-07 PROCEDURE — 74011000250 HC RX REV CODE- 250: Performed by: HOSPITALIST

## 2021-02-07 PROCEDURE — 74011636637 HC RX REV CODE- 636/637: Performed by: HOSPITALIST

## 2021-02-07 PROCEDURE — 94760 N-INVAS EAR/PLS OXIMETRY 1: CPT

## 2021-02-07 PROCEDURE — 74011250636 HC RX REV CODE- 250/636: Performed by: INTERNAL MEDICINE

## 2021-02-07 RX ORDER — BUDESONIDE 0.5 MG/2ML
500 INHALANT ORAL 2 TIMES DAILY
Qty: 60 ML | Refills: 0 | Status: ON HOLD | OUTPATIENT
Start: 2021-02-07 | End: 2021-04-08

## 2021-02-07 RX ORDER — IPRATROPIUM BROMIDE AND ALBUTEROL SULFATE 2.5; .5 MG/3ML; MG/3ML
3 SOLUTION RESPIRATORY (INHALATION)
Qty: 30 NEBULE | Refills: 0 | Status: SHIPPED | OUTPATIENT
Start: 2021-02-07 | End: 2022-03-22

## 2021-02-07 RX ORDER — DOXYCYCLINE 100 MG/1
100 CAPSULE ORAL 2 TIMES DAILY
Qty: 10 CAP | Refills: 0 | Status: SHIPPED | OUTPATIENT
Start: 2021-02-07 | End: 2021-03-03

## 2021-02-07 RX ORDER — PREDNISONE 5 MG/1
TABLET ORAL
Qty: 21 TAB | Refills: 0 | Status: SHIPPED | OUTPATIENT
Start: 2021-02-07 | End: 2021-03-03

## 2021-02-07 RX ORDER — ALBUTEROL SULFATE 90 UG/1
2 AEROSOL, METERED RESPIRATORY (INHALATION)
Qty: 1 INHALER | Refills: 1 | Status: ON HOLD | OUTPATIENT
Start: 2021-02-07 | End: 2022-04-15 | Stop reason: SDUPTHER

## 2021-02-07 RX ORDER — ARFORMOTEROL TARTRATE 15 UG/2ML
15 SOLUTION RESPIRATORY (INHALATION) 2 TIMES DAILY
Qty: 60 VIAL | Refills: 0 | Status: ON HOLD | OUTPATIENT
Start: 2021-02-07 | End: 2021-04-08

## 2021-02-07 RX ADMIN — METHYLPREDNISOLONE SODIUM SUCCINATE 40 MG: 40 INJECTION, POWDER, FOR SOLUTION INTRAMUSCULAR; INTRAVENOUS at 06:09

## 2021-02-07 RX ADMIN — ARFORMOTEROL TARTRATE 15 MCG: 15 SOLUTION RESPIRATORY (INHALATION) at 07:49

## 2021-02-07 RX ADMIN — INSULIN LISPRO 2 UNITS: 100 INJECTION, SOLUTION INTRAVENOUS; SUBCUTANEOUS at 06:09

## 2021-02-07 RX ADMIN — IPRATROPIUM BROMIDE AND ALBUTEROL SULFATE 3 ML: .5; 3 SOLUTION RESPIRATORY (INHALATION) at 07:49

## 2021-02-07 RX ADMIN — BUDESONIDE 500 MCG: 0.5 INHALANT RESPIRATORY (INHALATION) at 07:49

## 2021-02-07 RX ADMIN — IPRATROPIUM BROMIDE AND ALBUTEROL SULFATE 3 ML: .5; 3 SOLUTION RESPIRATORY (INHALATION) at 11:12

## 2021-02-07 RX ADMIN — IPRATROPIUM BROMIDE AND ALBUTEROL SULFATE 3 ML: .5; 3 SOLUTION RESPIRATORY (INHALATION) at 00:53

## 2021-02-07 RX ADMIN — FUROSEMIDE 20 MG: 20 TABLET ORAL at 09:54

## 2021-02-07 RX ADMIN — IPRATROPIUM BROMIDE AND ALBUTEROL SULFATE 3 ML: .5; 3 SOLUTION RESPIRATORY (INHALATION) at 03:59

## 2021-02-07 NOTE — PROGRESS NOTES
Problem: Falls - Risk of  Goal: *Absence of Falls  Description: Document Omaira Ryder Fall Risk and appropriate interventions in the flowsheet. Outcome: Resolved/Met  Note: Fall Risk Interventions:  Mobility Interventions: Patient to call before getting OOB         Medication Interventions: Teach patient to arise slowly, Patient to call before getting OOB    Elimination Interventions: Call light in reach              Problem: Patient Education: Go to Patient Education Activity  Goal: Patient/Family Education  Outcome: Resolved/Met     Problem: Pressure Injury - Risk of  Goal: *Prevention of pressure injury  Description: Document Nikos Scale and appropriate interventions in the flowsheet. Outcome: Resolved/Met  Note: Pressure Injury Interventions:             Activity Interventions: Pressure redistribution bed/mattress(bed type)    Mobility Interventions: Pressure redistribution bed/mattress (bed type)    Nutrition Interventions: Document food/fluid/supplement intake    Friction and Shear Interventions: HOB 30 degrees or less                Problem: Patient Education: Go to Patient Education Activity  Goal: Patient/Family Education  Outcome: Resolved/Met     Problem: Chronic Obstructive Pulmonary Disease (COPD)  Goal: *Oxygen saturation during activity within specified parameters  Outcome: Resolved/Met  Goal: *Able to remain out of bed as prescribed  Outcome: Resolved/Met  Goal: *Absence of hypoxia  Outcome: Resolved/Met  Goal: *Optimize nutritional status  Outcome: Resolved/Met     Problem: Patient Education: Go to Patient Education Activity  Goal: Patient/Family Education  Outcome: Resolved/Met     Problem: General Medical Care Plan  Goal: *Vital signs within specified parameters  Outcome: Resolved/Met  Goal: *Labs within defined limits  Outcome: Resolved/Met  Goal: *Absence of infection signs and symptoms  Outcome: Resolved/Met  Goal: *Optimal pain control at patient's stated goal  Outcome: Resolved/Met  Goal: *Skin integrity maintained  Outcome: Resolved/Met  Goal: *Fluid volume balance  Outcome: Resolved/Met  Goal: *Optimize nutritional status  Outcome: Resolved/Met  Goal: *Anxiety reduced or absent  Outcome: Resolved/Met  Goal: *Progressive mobility and function (eg: ADL's)  Outcome: Resolved/Met     Problem: Patient Education: Go to Patient Education Activity  Goal: Patient/Family Education  Outcome: Resolved/Met     Problem: Gas Exchange - Impaired  Goal: *Absence of hypoxia  Outcome: Resolved/Met     Problem: Patient Education: Go to Patient Education Activity  Goal: Patient/Family Education  Outcome: Resolved/Met     Problem: Patient Education: Go to Patient Education Activity  Goal: Patient/Family Education  Outcome: Resolved/Met

## 2021-02-07 NOTE — PROGRESS NOTES
Problem: Falls - Risk of  Goal: *Absence of Falls  Description: Document Odalys Blood Fall Risk and appropriate interventions in the flowsheet. Outcome: Progressing Towards Goal  Note: Fall Risk Interventions:  Mobility Interventions: Patient to call before getting OOB         Medication Interventions: Teach patient to arise slowly    Elimination Interventions: Call light in reach              Problem: Patient Education: Go to Patient Education Activity  Goal: Patient/Family Education  Outcome: Progressing Towards Goal     Problem: Pressure Injury - Risk of  Goal: *Prevention of pressure injury  Description: Document Nikos Scale and appropriate interventions in the flowsheet. Outcome: Progressing Towards Goal  Note: Pressure Injury Interventions:             Activity Interventions: Pressure redistribution bed/mattress(bed type)    Mobility Interventions: Pressure redistribution bed/mattress (bed type)    Nutrition Interventions: Document food/fluid/supplement intake    Friction and Shear Interventions: HOB 30 degrees or less                Problem: Patient Education: Go to Patient Education Activity  Goal: Patient/Family Education  Outcome: Progressing Towards Goal     Problem: Chronic Obstructive Pulmonary Disease (COPD)  Goal: *Oxygen saturation during activity within specified parameters  Outcome: Progressing Towards Goal  Goal: *Able to remain out of bed as prescribed  Outcome: Progressing Towards Goal  Goal: *Absence of hypoxia  Outcome: Progressing Towards Goal  Goal: *Optimize nutritional status  Outcome: Progressing Towards Goal     Problem: Patient Education: Go to Patient Education Activity  Goal: Patient/Family Education  Outcome: Progressing Towards Goal     Problem: General Medical Care Plan  Goal: *Vital signs within specified parameters  Outcome: Progressing Towards Goal  Goal: *Labs within defined limits  Outcome: Progressing Towards Goal  Goal: *Absence of infection signs and symptoms  Outcome: Progressing Towards Goal  Goal: *Optimal pain control at patient's stated goal  Outcome: Progressing Towards Goal  Goal: *Skin integrity maintained  Outcome: Progressing Towards Goal  Goal: *Fluid volume balance  Outcome: Progressing Towards Goal  Goal: *Optimize nutritional status  Outcome: Progressing Towards Goal  Goal: *Anxiety reduced or absent  Outcome: Progressing Towards Goal  Goal: *Progressive mobility and function (eg: ADL's)  Outcome: Progressing Towards Goal     Problem: Patient Education: Go to Patient Education Activity  Goal: Patient/Family Education  Outcome: Progressing Towards Goal     Problem: Gas Exchange - Impaired  Goal: *Absence of hypoxia  Outcome: Progressing Towards Goal     Problem: Patient Education: Go to Patient Education Activity  Goal: Patient/Family Education  Outcome: Progressing Towards Goal     Problem: Patient Education: Go to Patient Education Activity  Goal: Patient/Family Education  Outcome: Progressing Towards Goal

## 2021-02-07 NOTE — DISCHARGE SUMMARY
Discharge Summary    Patient: Seymour Chris MRN: [id number]  CSN: [id number]    YOB: 1945  Age: 66 y.o.   Sex: male    DOA: 2/2/2021 LOS:  LOS: 5 days   Discharge Date:      Primary Care Provider:  Ron Sanders MD    Admission Diagnoses: COPD exacerbation ([facility] [J44.1]  Atrial fibrillation with RVR ([facility] [I48.91]    Discharge Diagnoses:    Problem List as of 2/7/2021 Date Reviewed: 6/13/2020          Codes Class Noted - Resolved    Respiratory distress ICD-10-CM: R06.03  ICD-9-CM: 786.09  2/2/2021 - Present        COPD exacerbation ([facility] ICD-10-CM: J44.1  ICD-9-CM: 491.21  2/2/2021 - Present        Atrial fibrillation with RVR ([facility] ICD-10-CM: I48.91  ICD-9-CM: 427.31  2/2/2021 - Present        COPD (chronic obstructive pulmonary disease) ([facility] ICD-10-CM: J44.9  ICD-9-CM: 257  8/5/2020 - Present        CAP (community acquired pneumonia) ICD-10-CM: J18.9  ICD-9-CM: 486  8/5/2020 - Present        Advanced care planning/counseling discussion ICD-10-CM: Z71.89  ICD-9-CM: V65.49  Unknown - Present        Hypokalemia ICD-10-CM: E87.6  ICD-9-CM: 276.8  4/14/2020 - Present        Hyponatremia ICD-10-CM: E87.1  ICD-9-CM: 276.1  4/10/2020 - Present        COPD with acute exacerbation ([facility] ICD-10-CM: J44.1  ICD-9-CM: 491.21  4/9/2020 - Present        Acute respiratory failure with hypoxia and hypercarbia ([facility] ICD-10-CM: J96.01, J96.02  ICD-9-CM: 518.81  4/9/2020 - Present        Pleural effusion, right ICD-10-CM: J90  ICD-9-CM: 511.9  2/22/2020 - Present        Tobacco dependence ICD-10-CM: F17.200  ICD-9-CM: 305.1  2/21/2020 - Present        Hyponatremia ICD-10-CM: E87.1  ICD-9-CM: 276.1  2/21/2020 - Present        Paroxysmal atrial fibrillation ([facility] ICD-10-CM: I48.0  ICD-9-CM: 427.31  8/19/2019 - Present        Chronic respiratory failure with hypoxia ([facility] ICD-10-CM: J96.11  ICD-9-CM: 518.83, 799.02  8/19/2019 - Present        Debility ICD-10-CM: R53.81  ICD-9-CM: 799.3  7/8/2019 - Present Acute exacerbation of chronic obstructive pulmonary disease (COPD) ([facility] ICD-10-CM: J44.1  ICD-9-CM: 491.21  2/8/2019 - Present        Asbestosis ([facility] ICD-10-CM: M99  ICD-9-CM: 682  10/19/2018 - Present        Chronic renal disease, stage 3, moderately decreased glomerular filtration rate between 30-59 mL/min/1.73 square meter ICD-10-CM: N18.30  ICD-9-CM: 585.3  7/20/2018 - Present        COPD (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 (HCC) ICD-10-CM: J44.9  ICD-9-CM: 491.20  4/20/2018 - Present        Hypertension ICD-10-CM: I10  ICD-9-CM: 401.9  Unknown - Present        RESOLVED: Acute on chronic respiratory failure with hypoxia and hypercapnia (HCC) ICD-10-CM: J96.21, J96.22  ICD-9-CM: 518.84, 786.09, 799.02  10/30/2019 - 2/25/2020        RESOLVED: COPD with asthma and status asthmaticus ([facility] ICD-10-CM: J44.9, J45.902  ICD-9-CM: 493.21  7/20/2018 - 2/8/2019        RESOLVED: Status asthmaticus with COPD (chronic obstructive pulmonary disease) ([facility] ICD-10-CM: J44.9, J45.902  ICD-9-CM: 493.21  4/20/2018 - 2/8/2019        RESOLVED: PVC's (premature ventricular contractions) ICD-10-CM: I49.3  ICD-9-CM: 427.69  4/20/2018 - 2/8/2019        RESOLVED: Acute respiratory failure ([facility] ICD-10-CM: J96.00  ICD-9-CM: 518.81  10/2/2014 - 3/18/2017        RESOLVED: UREIL (acute kidney injury) ([facility] ICD-10-CM: N17.9  ICD-9-CM: 584.9  10/2/2014 - 2/8/2019        RESOLVED: Pneumonia ICD-10-CM: J18.9  ICD-9-CM: 486  10/2/2014 - 3/18/2017              Discharge Medications:     Current Discharge Medication List      START taking these medications    Details   albuterol-ipratropium (DUO-NEB) 2.5 mg-0.5 mg/3 ml nebu 3 mL by Nebulization route every four (4) hours as needed for Wheezing. Qty: 30 Nebule, Refills: 0      doxycycline (MONODOX) 100 mg capsule Take 1 Cap by mouth two (2) times a day.   Qty: 10 Cap, Refills: 0         CONTINUE these medications which have CHANGED    Details   predniSONE (STERAPRED) 5 mg dose pack See administration instruction per 5mg dose pack  Qty: 21 Tab, Refills: 0      budesonide (PULMICORT) 0.5 mg/2 mL nbsp 2 mL by Nebulization route two (2) times a day. Qty: 60 mL, Refills: 0      albuterol (PROVENTIL HFA, VENTOLIN HFA, PROAIR HFA) 90 mcg/actuation inhaler Take 2 Puffs by inhalation every four (4) hours as needed for Wheezing or Shortness of Breath. Qty: 1 Inhaler, Refills: 1      arformoteroL (BROVANA) 15 mcg/2 mL nebu neb solution 2 mL by Nebulization route two (2) times a day. Qty: 60 Vial, Refills: 0         CONTINUE these medications which have NOT CHANGED    Details   furosemide (Lasix) 20 mg tablet Take 20 mg by mouth daily. dilTIAZem (CARDIZEM) 30 mg tablet Take 1 Tab by mouth Before breakfast, lunch, and dinner. Qty: 90 Tab, Refills: 0      dextran 70/hypromellose (ARTIFICIAL TEARS, PF, OP) Apply 2 Drops to eye as needed for Other (both eyes for dryness). diphenhydrAMINE (BENADRYL) 25 mg capsule Take 25 mg by mouth nightly as needed for Itching. tamsulosin (FLOMAX) 0.4 mg capsule Take 0.4 mg by mouth every evening. OXYGEN-AIR DELIVERY SYSTEMS 2 L/min by Nasal route continuous. acetaminophen (TYLENOL) 325 mg tablet Take 650 mg by mouth every eight (8) hours as needed for Pain. Discharge Condition: Fair    Procedures : none    Consults: None      PHYSICAL EXAM   Visit Vitals  BP (!) 172/77   Pulse 82   Temp 99.3 °F (37.4 °C)   Resp 18   Ht 5' 8\" (1.727 m)   Wt 100.2 kg (221 lb)   SpO2 99%   BMI 33.60 kg/m²     General: Awake, cooperative, no acute distress    HEENT: NC, Atraumatic. PERRLA, EOMI. Anicteric sclerae. Lungs:  CTA Bilaterally. No Wheezing/Rhonchi/Rales. Heart:  Regular  rhythm,  No murmur, No Rubs, No Gallops  Abdomen: Soft, Non distended, Non tender. +Bowel sounds,   Extremities: No c/c/e  Psych:   Not anxious or agitated. Neurologic:  No acute neurological deficits.                                      Admission HPI :   Jolena Prom Lindsey Jain is a 66 y.o. male with chronic respiratory failure, COPD, PAF, HTN, presents to ER with concerns of SOB for the past several days. It has been getting progressively getting worse. He has been using neb treatment with no help. associated with productive cough, He denies any fever/chills, chest pain, N/V.  EMS was called and found patient in tripod position, with difficulty breathing and not able to complete sentences. He was placed on CPAP with improvement in symptoms. In ER he received solumedrol and neb treatment and he was still wheezing.       Hospital Course :   COPD exacerbation - start on Intravenous steroids. Inhaled short acting beta 2 agonist and anticholinergics. Empiric antibiotics. Improved weaned to baseline home oxygen of 2liters  Discharged on steroid taper and doxycline  Xray suggested atlectesis vs pneuonia   Treated with doxy      Start PTdid well did not want home health      PAF -   NSR troponin negative      HTN -   controlled, monitor BP  Continued on home meds     CKD -   Stage 2, monitor renal function.     Hyponatremia -  Follow sodium levels  Improved to 134 on discharge given lasix iv/po     DVT prophylaxis with lovenox    Activity: Activity as tolerated    Diet: Cardiac Diet and Diabetic Diet    Follow-up: PCP    Disposition: home    Minutes spent on discharge: 35 min      Labs: Results:       Chemistry Recent Labs     02/05/21  0402   *   *   K 4.4   CL 98*   CO2 28   BUN 33*   CREA 1.12   CA 8.1*   AGAP 8   BUCR 29*      CBC w/Diff Recent Labs     02/05/21  0402   WBC 7.4   RBC 3.56*   HGB 10.0*   HCT 31.2*         Cardiac Enzymes No results for input(s): CPK, CKND1, WILLARD in the last 72 hours. No lab exists for component: CKRMB, TROIP   Coagulation No results for input(s): PTP, INR, APTT, INREXT, INREXT in the last 72 hours.     Lipid Panel No results found for: CHOL, CHOLPOCT, CHOLX, CHLST, CHOLV, 098359, HDL, HDLP, LDL, LDLC, DLDLP, 569953, VLDLC, VLDL, TGLX, TRIGL, TRIGP, TGLPOCT, CHHD, CHHDX   BNP No results for input(s): BNPP in the last 72 hours. Liver Enzymes No results for input(s): TP, ALB, TBIL, AP in the last 72 hours. No lab exists for component: SGOT, GPT, DBIL   Thyroid Studies Lab Results   Component Value Date/Time    TSH 0.86 10/20/2019 01:05 AM            Significant Diagnostic Studies: Xr Chest Port    Result Date: 2/2/2021  EXAM: XR CHEST PORT CLINICAL INDICATION/HISTORY: sob, copd and CHF history -Additional: None COMPARISON: 12/29/2020 TECHNIQUE: Portable frontal view of the chest _______________ FINDINGS: SUPPORT DEVICES: None. HEART AND MEDIASTINUM: Cardiomediastinal silhouette within normal limits. LUNGS AND PLEURAL SPACES: Right pleural calcified plaque. Superimposed right suprahilar and right lung base interstitial/parenchymal opacities. Left lung is clear. No large effusion or pneumothorax. _______________     Chronic appearing right suprahilar and right lung base interstitial/parenchymal opacities, cannot exclude superimposed acute infiltrate. No results found for this or any previous visit.         CC: Chelle Khoury MD

## 2021-02-07 NOTE — ROUTINE PROCESS
Bedside shift change report given to Melisa Blevins  (oncoming nurse) by Vince Cardona RN  (offgoing nurse). Report included the following information SBAR, Kardex, Procedure Summary, Intake/Output, MAR and Recent Results.

## 2021-02-07 NOTE — PROGRESS NOTES
[id number]: The pt rested well overnight with no complaints. No new clinical issues. Breath sounds equal and symmetrical, call bell was within reach, and bed was in lowest position.      Night shift chart check completed

## 2021-02-07 NOTE — PROGRESS NOTES
0740: Assumed patient care this morning from off going nurse Randy Rich 1937   8613: Shift assessment completed at this time. Patient has no c/o at this time bed locked in lowest position with call bell in reach. 1135: Discharge instructions reviewed with patient, patient had no questions at this time. This nurse removed telebox , IV access removed by aide. Patient stated he would like to eat lunch before leaving.

## 2021-02-08 ENCOUNTER — PATIENT OUTREACH (OUTPATIENT)
Dept: CASE MANAGEMENT | Age: 76
End: 2021-02-08

## 2021-02-08 NOTE — PROGRESS NOTES
2/8/2021 11:15 AM     CTN attempted to contact patient for hospital follow up. Patient admitted to [facility] on 2/2/21 and discharged on 2/7/21 for COPD exacerbation. Message left introducing myself, the purpose of the call and giving my contact information. Requested that patient call back at his earliest convenience.

## 2021-02-09 ENCOUNTER — PATIENT OUTREACH (OUTPATIENT)
Dept: CASE MANAGEMENT | Age: 76
End: 2021-02-09

## 2021-02-09 NOTE — PROGRESS NOTES
2/9/2021 11:57 AM    CTN attempted to contact patient for hospital follow up. [place] [facility] [place] and discharged on 2/7/21 for COPD exacerbation. Message left introducing myself, the purpose of the call and giving my contact information. Requested that patient call back at his earliest convenience.

## 2021-02-15 ENCOUNTER — PATIENT OUTREACH (OUTPATIENT)
Dept: CASE MANAGEMENT | Age: 76
End: 2021-02-15

## 2021-02-15 NOTE — PROGRESS NOTES
Physician Progress Note      Gabbi Benites  [facility] #:                  [id number]  :                       1945  ADMIT DATE:       2021 5:12 AM  DISCH DATE:        2021 12:45 PM  RESPONDING  PROVIDER #:        Bailey Sandy MD          QUERY TEXT:    Dear Hospitalist,    Patient admitted with COPD with exacerbation. Noted documentation of CKD stage 2 in the attending PN  and CKD stage 3 on the Patient Active Problem List throughout this admission. If possible, please document in progress notes and discharge summary if you are evaluating and /or treating any of the following: The medical record reflects the following:    Risk Factors: PMH CKD 3    Clinical Indicators:  > GFR >60    Treatment: Receiving serial BMP, monitoring renal function    Thank you,  Noelle Orellana RN, BSN, [facility]  [phone]  Options provided:  -- CKD Stage 2 GFR 60-90 confirmed and CKD Stage 3a GFR 45-59 ruled out  -- CKD Stage 3a GFR 45-59 confirmed and CKD Stage 2 GFR 60-90  ruled out  -- Other - I will add my own diagnosis  -- Disagree - Not applicable / Not valid  -- Disagree - Clinically unable to determine / Unknown  -- Refer to Clinical Documentation Reviewer    PROVIDER RESPONSE TEXT:    After study, CKD stage 2 confirmed and CKD stage 3 ruled out.     Query created by: Ariel Negrete on 2021 3:35 PM      Electronically signed by:  Bailey Sandy MD 2021 7:34 PM

## 2021-02-26 ENCOUNTER — HOSPITAL ENCOUNTER (INPATIENT)
Age: 76
LOS: 5 days | Discharge: HOME | DRG: 208 | End: 2021-03-03
Attending: EMERGENCY MEDICINE | Admitting: INTERNAL MEDICINE
Payer: MEDICARE

## 2021-02-26 ENCOUNTER — ANESTHESIA (OUTPATIENT)
Dept: EMERGENCY DEPT | Age: 76
DRG: 208 | End: 2021-02-26
Payer: MEDICARE

## 2021-02-26 ENCOUNTER — APPOINTMENT (OUTPATIENT)
Dept: GENERAL RADIOLOGY | Age: 76
DRG: 208 | End: 2021-02-26
Attending: EMERGENCY MEDICINE
Payer: MEDICARE

## 2021-02-26 ENCOUNTER — ANESTHESIA EVENT (OUTPATIENT)
Dept: EMERGENCY DEPT | Age: 76
DRG: 208 | End: 2021-02-26
Payer: MEDICARE

## 2021-02-26 DIAGNOSIS — R06.02 SOB (SHORTNESS OF BREATH): ICD-10-CM

## 2021-02-26 PROBLEM — J96.00 ACUTE RESPIRATORY FAILURE (HCC): Status: ACTIVE | Noted: 2021-02-26

## 2021-02-26 LAB
ALBUMIN SERPL-MCNC: 3.4 G/DL (ref 3.4–5)
ALBUMIN/GLOB SERPL: 1 {RATIO} (ref 0.8–1.7)
ALP SERPL-CCNC: 129 U/L (ref 45–117)
ALT SERPL-CCNC: 23 U/L (ref 16–61)
ANION GAP SERPL CALC-SCNC: 6 MMOL/L (ref 3–18)
ARTERIAL PATENCY WRIST A: YES
AST SERPL-CCNC: 13 U/L (ref 10–38)
ATRIAL RATE: 121 BPM
BASE DEFICIT BLD-SCNC: 2 MMOL/L
BASOPHILS # BLD: 0.1 K/UL (ref 0–0.1)
BASOPHILS NFR BLD: 1 % (ref 0–2)
BDY SITE: ABNORMAL
BILIRUB SERPL-MCNC: 0.3 MG/DL (ref 0.2–1)
BNP SERPL-MCNC: 89 PG/ML (ref 0–1800)
BODY TEMPERATURE: 98.6
BUN SERPL-MCNC: 23 MG/DL (ref 7–18)
BUN/CREAT SERPL: 15 (ref 12–20)
CALCIUM SERPL-MCNC: 8.9 MG/DL (ref 8.5–10.1)
CALCULATED P AXIS, ECG09: 71 DEGREES
CALCULATED R AXIS, ECG10: 35 DEGREES
CALCULATED T AXIS, ECG11: 77 DEGREES
CHLORIDE SERPL-SCNC: 101 MMOL/L (ref 100–111)
CK MB CFR SERPL CALC: 4.8 % (ref 0–4)
CK MB SERPL-MCNC: 3.1 NG/ML (ref 5–25)
CK SERPL-CCNC: 64 U/L (ref 39–308)
CO2 SERPL-SCNC: 29 MMOL/L (ref 21–32)
COVID-19 RAPID TEST, COVR: NOT DETECTED
CREAT SERPL-MCNC: 1.49 MG/DL (ref 0.6–1.3)
DIAGNOSIS, 93000: NORMAL
DIFFERENTIAL METHOD BLD: ABNORMAL
EOSINOPHIL # BLD: 0.8 K/UL (ref 0–0.4)
EOSINOPHIL NFR BLD: 7 % (ref 0–5)
ERYTHROCYTE [DISTWIDTH] IN BLOOD BY AUTOMATED COUNT: 14 % (ref 11.6–14.5)
GAS FLOW.O2 O2 DELIVERY SYS: ABNORMAL L/MIN
GAS FLOW.O2 SETTING OXYMISER: 18 BPM
GLOBULIN SER CALC-MCNC: 3.4 G/DL (ref 2–4)
GLUCOSE SERPL-MCNC: 177 MG/DL (ref 74–99)
HCO3 BLD-SCNC: 28.2 MMOL/L (ref 22–26)
HCT VFR BLD AUTO: 36.4 % (ref 36–48)
HGB BLD-MCNC: 11.7 G/DL (ref 13–16)
LACTATE BLD-SCNC: 0.94 MMOL/L (ref 0.4–2)
LYMPHOCYTES # BLD: 3.4 K/UL (ref 0.9–3.6)
LYMPHOCYTES NFR BLD: 30 % (ref 21–52)
MAGNESIUM SERPL-MCNC: 2.1 MG/DL (ref 1.6–2.6)
MCH RBC QN AUTO: 28.5 PG (ref 24–34)
MCHC RBC AUTO-ENTMCNC: 32.1 G/DL (ref 31–37)
MCV RBC AUTO: 88.6 FL (ref 74–97)
MONOCYTES # BLD: 1 K/UL (ref 0.05–1.2)
MONOCYTES NFR BLD: 9 % (ref 3–10)
NEUTS SEG # BLD: 6.1 K/UL (ref 1.8–8)
NEUTS SEG NFR BLD: 53 % (ref 40–73)
O2/TOTAL GAS SETTING VFR VENT: 1 %
P-R INTERVAL, ECG05: 166 MS
PCO2 BLD: 99.8 MMHG (ref 35–45)
PEEP RESPIRATORY: 5 CMH2O
PH BLD: 7.06 [PH] (ref 7.35–7.45)
PLATELET # BLD AUTO: 377 K/UL (ref 135–420)
PMV BLD AUTO: 8.8 FL (ref 9.2–11.8)
PO2 BLD: 82 MMHG (ref 80–100)
POTASSIUM SERPL-SCNC: 4.2 MMOL/L (ref 3.5–5.5)
PROT SERPL-MCNC: 6.8 G/DL (ref 6.4–8.2)
Q-T INTERVAL, ECG07: 290 MS
QRS DURATION, ECG06: 78 MS
QTC CALCULATION (BEZET), ECG08: 411 MS
RBC # BLD AUTO: 4.11 M/UL (ref 4.7–5.5)
SAO2 % BLD: 89 % (ref 92–97)
SERVICE CMNT-IMP: ABNORMAL
SODIUM SERPL-SCNC: 136 MMOL/L (ref 136–145)
SOURCE, COVRS: NORMAL
SPECIMEN TYPE: ABNORMAL
TOTAL RESP. RATE, ITRR: 18
TROPONIN I SERPL-MCNC: <0.02 NG/ML (ref 0–0.04)
VENTILATION MODE VENT: ABNORMAL
VENTRICULAR RATE, ECG03: 121 BPM
VOLUME CONTROL IVLC: YES
VT SETTING VENT: 450 ML
WBC # BLD AUTO: 11.3 K/UL (ref 4.6–13.2)

## 2021-02-26 PROCEDURE — 99285 EMERGENCY DEPT VISIT HI MDM: CPT

## 2021-02-26 PROCEDURE — 94002 VENT MGMT INPAT INIT DAY: CPT

## 2021-02-26 PROCEDURE — 0BH17EZ INSERTION OF ENDOTRACHEAL AIRWAY INTO TRACHEA, VIA NATURAL OR ARTIFICIAL OPENING: ICD-10-PCS | Performed by: INTERNAL MEDICINE

## 2021-02-26 PROCEDURE — 71045 X-RAY EXAM CHEST 1 VIEW: CPT

## 2021-02-26 PROCEDURE — 74011000250 HC RX REV CODE- 250

## 2021-02-26 PROCEDURE — 80053 COMPREHEN METABOLIC PANEL: CPT

## 2021-02-26 PROCEDURE — 83605 ASSAY OF LACTIC ACID: CPT

## 2021-02-26 PROCEDURE — 74011250636 HC RX REV CODE- 250/636: Performed by: EMERGENCY MEDICINE

## 2021-02-26 PROCEDURE — 74011250636 HC RX REV CODE- 250/636: Performed by: NURSE ANESTHETIST, CERTIFIED REGISTERED

## 2021-02-26 PROCEDURE — 82803 BLOOD GASES ANY COMBINATION: CPT

## 2021-02-26 PROCEDURE — 82553 CREATINE MB FRACTION: CPT

## 2021-02-26 PROCEDURE — 31500 INSERT EMERGENCY AIRWAY: CPT

## 2021-02-26 PROCEDURE — 74011000258 HC RX REV CODE- 258: Performed by: EMERGENCY MEDICINE

## 2021-02-26 PROCEDURE — 65610000006 HC RM INTENSIVE CARE

## 2021-02-26 PROCEDURE — 77010033711 HC HIGH FLOW OXYGEN

## 2021-02-26 PROCEDURE — 77030005513 HC CATH URETH FOL11 MDII -B

## 2021-02-26 PROCEDURE — 36600 WITHDRAWAL OF ARTERIAL BLOOD: CPT

## 2021-02-26 PROCEDURE — 93005 ELECTROCARDIOGRAM TRACING: CPT

## 2021-02-26 PROCEDURE — 94640 AIRWAY INHALATION TREATMENT: CPT

## 2021-02-26 PROCEDURE — 74011000250 HC RX REV CODE- 250: Performed by: EMERGENCY MEDICINE

## 2021-02-26 PROCEDURE — 83880 ASSAY OF NATRIURETIC PEPTIDE: CPT

## 2021-02-26 PROCEDURE — 87635 SARS-COV-2 COVID-19 AMP PRB: CPT

## 2021-02-26 PROCEDURE — 96375 TX/PRO/DX INJ NEW DRUG ADDON: CPT

## 2021-02-26 PROCEDURE — 85025 COMPLETE CBC W/AUTO DIFF WBC: CPT

## 2021-02-26 PROCEDURE — 51702 INSERT TEMP BLADDER CATH: CPT

## 2021-02-26 PROCEDURE — 96374 THER/PROPH/DIAG INJ IV PUSH: CPT

## 2021-02-26 PROCEDURE — 74011250636 HC RX REV CODE- 250/636

## 2021-02-26 PROCEDURE — 74018 RADEX ABDOMEN 1 VIEW: CPT

## 2021-02-26 PROCEDURE — 87040 BLOOD CULTURE FOR BACTERIA: CPT

## 2021-02-26 PROCEDURE — A4615 CANNULA NASAL: HCPCS

## 2021-02-26 PROCEDURE — 83735 ASSAY OF MAGNESIUM: CPT

## 2021-02-26 PROCEDURE — 75810000455 HC PLCMT CENT VENOUS CATH LVL 2 5182

## 2021-02-26 PROCEDURE — 77030020362 HC SOL INJ STRL H2O 1000ML BG LF

## 2021-02-26 PROCEDURE — 5A1945Z RESPIRATORY VENTILATION, 24-96 CONSECUTIVE HOURS: ICD-10-PCS | Performed by: INTERNAL MEDICINE

## 2021-02-26 RX ORDER — PROPOFOL 10 MG/ML
7 VIAL (ML) INTRAVENOUS
Status: DISCONTINUED | OUTPATIENT
Start: 2021-02-26 | End: 2021-02-26 | Stop reason: DRUGHIGH

## 2021-02-26 RX ORDER — IPRATROPIUM BROMIDE AND ALBUTEROL SULFATE 2.5; .5 MG/3ML; MG/3ML
3 SOLUTION RESPIRATORY (INHALATION)
Status: COMPLETED | OUTPATIENT
Start: 2021-02-26 | End: 2021-02-26

## 2021-02-26 RX ORDER — LORAZEPAM 2 MG/ML
2 INJECTION INTRAMUSCULAR
Status: COMPLETED | OUTPATIENT
Start: 2021-02-26 | End: 2021-02-26

## 2021-02-26 RX ORDER — PROPOFOL 10 MG/ML
7 VIAL (ML) INTRAVENOUS
Status: DISCONTINUED | OUTPATIENT
Start: 2021-02-26 | End: 2021-02-26

## 2021-02-26 RX ORDER — SUCCINYLCHOLINE CHLORIDE 100 MG/5ML
SYRINGE (ML) INTRAVENOUS AS NEEDED
Status: DISCONTINUED | OUTPATIENT
Start: 2021-02-26 | End: 2021-02-26 | Stop reason: HOSPADM

## 2021-02-26 RX ORDER — LORAZEPAM 2 MG/ML
INJECTION INTRAMUSCULAR
Status: DISPENSED
Start: 2021-02-26 | End: 2021-02-27

## 2021-02-26 RX ORDER — NOREPINEPHRINE BIT/0.9 % NACL 8 MG/250ML
INFUSION BOTTLE (ML) INTRAVENOUS
Status: COMPLETED
Start: 2021-02-26 | End: 2021-02-26

## 2021-02-26 RX ORDER — LORAZEPAM 2 MG/ML
1 INJECTION INTRAMUSCULAR
Status: COMPLETED | OUTPATIENT
Start: 2021-02-26 | End: 2021-02-26

## 2021-02-26 RX ORDER — PROPOFOL 10 MG/ML
7 VIAL (ML) INTRAVENOUS
Status: DISCONTINUED | OUTPATIENT
Start: 2021-02-26 | End: 2021-02-27

## 2021-02-26 RX ORDER — PROPOFOL 10 MG/ML
INJECTION, EMULSION INTRAVENOUS AS NEEDED
Status: DISCONTINUED | OUTPATIENT
Start: 2021-02-26 | End: 2021-02-26 | Stop reason: HOSPADM

## 2021-02-26 RX ORDER — PANTOPRAZOLE SODIUM 40 MG/10ML
40 INJECTION, POWDER, LYOPHILIZED, FOR SOLUTION INTRAVENOUS EVERY 24 HOURS
Status: DISCONTINUED | OUTPATIENT
Start: 2021-02-27 | End: 2021-02-26 | Stop reason: DRUGHIGH

## 2021-02-26 RX ORDER — VECURONIUM BROMIDE FOR INJECTION 1 MG/ML
10 INJECTION, POWDER, LYOPHILIZED, FOR SOLUTION INTRAVENOUS ONCE
Status: DISPENSED | OUTPATIENT
Start: 2021-02-26 | End: 2021-02-27

## 2021-02-26 RX ORDER — IPRATROPIUM BROMIDE AND ALBUTEROL SULFATE 2.5; .5 MG/3ML; MG/3ML
SOLUTION RESPIRATORY (INHALATION)
Status: COMPLETED
Start: 2021-02-26 | End: 2021-02-26

## 2021-02-26 RX ORDER — NOREPINEPHRINE BIT/0.9 % NACL 8 MG/250ML
.5-16 INFUSION BOTTLE (ML) INTRAVENOUS
Status: DISCONTINUED | OUTPATIENT
Start: 2021-02-26 | End: 2021-02-28

## 2021-02-26 RX ADMIN — METHYLPREDNISOLONE SODIUM SUCCINATE 125 MG: 125 INJECTION, POWDER, FOR SOLUTION INTRAMUSCULAR; INTRAVENOUS at 19:11

## 2021-02-26 RX ADMIN — IPRATROPIUM BROMIDE AND ALBUTEROL SULFATE 3 ML: .5; 3 SOLUTION RESPIRATORY (INHALATION) at 19:11

## 2021-02-26 RX ADMIN — PROPOFOL 30 MCG/KG/MIN: 10 INJECTION, EMULSION INTRAVENOUS at 21:26

## 2021-02-26 RX ADMIN — Medication 4 MCG/MIN: at 21:59

## 2021-02-26 RX ADMIN — SODIUM CHLORIDE 1000 ML: 900 INJECTION, SOLUTION INTRAVENOUS at 21:00

## 2021-02-26 RX ADMIN — PROPOFOL 45 MCG/KG/MIN: 10 INJECTION, EMULSION INTRAVENOUS at 21:57

## 2021-02-26 RX ADMIN — AZITHROMYCIN MONOHYDRATE 500 MG: 500 INJECTION, POWDER, LYOPHILIZED, FOR SOLUTION INTRAVENOUS at 21:39

## 2021-02-26 RX ADMIN — CEFTRIAXONE 2 G: 2 INJECTION, POWDER, FOR SOLUTION INTRAMUSCULAR; INTRAVENOUS at 21:38

## 2021-02-26 RX ADMIN — IPRATROPIUM BROMIDE AND ALBUTEROL SULFATE 3 ML: 2.5; .5 SOLUTION RESPIRATORY (INHALATION) at 19:10

## 2021-02-26 RX ADMIN — IPRATROPIUM BROMIDE AND ALBUTEROL SULFATE 3 ML: .5; 3 SOLUTION RESPIRATORY (INHALATION) at 19:10

## 2021-02-26 RX ADMIN — LORAZEPAM 1 MG: 2 INJECTION INTRAMUSCULAR; INTRAVENOUS at 19:21

## 2021-02-26 RX ADMIN — PROPOFOL 40 MCG/KG/MIN: 10 INJECTION, EMULSION INTRAVENOUS at 21:52

## 2021-02-26 RX ADMIN — FENTANYL CITRATE 50 MCG/HR: 0.05 INJECTION, SOLUTION INTRAMUSCULAR; INTRAVENOUS at 22:47

## 2021-02-26 RX ADMIN — PROPOFOL 35 MCG/KG/MIN: 10 INJECTION, EMULSION INTRAVENOUS at 21:41

## 2021-02-26 RX ADMIN — PROPOFOL 20 MCG/KG/MIN: 10 INJECTION, EMULSION INTRAVENOUS at 20:28

## 2021-02-26 RX ADMIN — PROPOFOL 50 MCG/KG/MIN: 10 INJECTION, EMULSION INTRAVENOUS at 22:04

## 2021-02-26 RX ADMIN — LORAZEPAM 2 MG: 2 INJECTION INTRAMUSCULAR; INTRAVENOUS at 20:15

## 2021-02-26 RX ADMIN — PROPOFOL 25 MCG/KG/MIN: 10 INJECTION, EMULSION INTRAVENOUS at 20:49

## 2021-02-26 RX ADMIN — PROPOFOL 200 MG: 10 INJECTION, EMULSION INTRAVENOUS at 19:56

## 2021-02-26 RX ADMIN — Medication 200 MG: at 19:56

## 2021-02-26 RX ADMIN — LORAZEPAM 2 MG: 2 INJECTION INTRAMUSCULAR; INTRAVENOUS at 20:16

## 2021-02-27 ENCOUNTER — APPOINTMENT (OUTPATIENT)
Dept: NON INVASIVE DIAGNOSTICS | Age: 76
DRG: 208 | End: 2021-02-27
Attending: INTERNAL MEDICINE
Payer: MEDICARE

## 2021-02-27 LAB
ARTERIAL PATENCY WRIST A: ABNORMAL
ATRIAL RATE: 394 BPM
BASE DEFICIT BLD-SCNC: 1 MMOL/L
BDY SITE: ABNORMAL
CALCULATED R AXIS, ECG10: 49 DEGREES
CALCULATED T AXIS, ECG11: 50 DEGREES
CK MB CFR SERPL CALC: 5.5 % (ref 0–4)
CK MB SERPL-MCNC: 3.6 NG/ML (ref 5–25)
CK SERPL-CCNC: 66 U/L (ref 39–308)
DIAGNOSIS, 93000: NORMAL
ECHO AV ANNULUS DIAM: 3.27 CM
ECHO AV AREA PEAK VELOCITY: 2.6 CM2
ECHO AV AREA VTI: 3.3 CM2
ECHO AV AREA/BSA PEAK VELOCITY: 1.3 CM2/M2
ECHO AV AREA/BSA VTI: 1.6 CM2/M2
ECHO AV MEAN GRADIENT: 3.49 MMHG
ECHO AV PEAK GRADIENT: 5.91 MMHG
ECHO AV PEAK VELOCITY: 122 CM/S
ECHO AV VTI: 26.79 CM
ECHO IVC PROX: 1.4 CM
ECHO LA VOL 2C: 25.34 ML (ref 18–58)
ECHO LA VOL 4C: 58.73 ML (ref 18–58)
ECHO LA VOL BP: 48.55 ML (ref 18–58)
ECHO LV E' LATERAL VELOCITY: 11 CM/S
ECHO LV E' SEPTAL VELOCITY: 7 CM/S
ECHO LV EDV A2C: 50.14 ML
ECHO LV EDV A4C: 87.65 ML
ECHO LV EDV BP: 74.7 ML (ref 67–155)
ECHO LV EJECTION FRACTION A2C: 46 %
ECHO LV EJECTION FRACTION A4C: 47 %
ECHO LV EJECTION FRACTION BIPLANE: 48.8 % (ref 55–100)
ECHO LV ESV A2C: 27.3 ML
ECHO LV ESV A4C: 46.41 ML
ECHO LV ESV BP: 38.23 ML (ref 22–58)
ECHO LV INTERNAL DIMENSION DIASTOLIC: 4.87 CM (ref 4.2–5.9)
ECHO LV INTERNAL DIMENSION SYSTOLIC: 3.83 CM
ECHO LV IVSD: 1.1 CM (ref 0.6–1)
ECHO LV MASS 2D: 203.5 G (ref 88–224)
ECHO LV MASS INDEX 2D: 99.2 G/M2 (ref 49–115)
ECHO LV POSTERIOR WALL DIASTOLIC: 1.14 CM (ref 0.6–1)
ECHO LVOT CARDIAC OUTPUT: 14.49 L/MIN
ECHO LVOT DIAM: 2.26 CM
ECHO LVOT PEAK GRADIENT: 2.49 MMHG
ECHO LVOT PEAK VELOCITY: 79 CM/S
ECHO LVOT SV: 88.5 ML
ECHO LVOT VTI: 22.05 CM
ECHO MV A VELOCITY: 107 CM/S
ECHO MV AREA PHT: 6.4 CM2
ECHO MV E DECELERATION TIME (DT): 118.48 MS
ECHO MV E VELOCITY: 86 CM/S
ECHO MV E/A RATIO: 0.8
ECHO MV E/E' LATERAL: 7.82
ECHO MV E/E' RATIO (AVERAGED): 10.05
ECHO MV E/E' SEPTAL: 12.29
ECHO MV PRESSURE HALF TIME (PHT): 34.36 MS
ECHO RA AREA 4C: 15.29 CM2
ECHO RV INTERNAL DIMENSION: 2.81 CM
EST. AVERAGE GLUCOSE BLD GHB EST-MCNC: 120 MG/DL
GAS FLOW.O2 O2 DELIVERY SYS: ABNORMAL L/MIN
GAS FLOW.O2 SETTING OXYMISER: 24 BPM
GLUCOSE BLD STRIP.AUTO-MCNC: 133 MG/DL (ref 70–110)
GLUCOSE BLD STRIP.AUTO-MCNC: 149 MG/DL (ref 70–110)
GLUCOSE BLD STRIP.AUTO-MCNC: 152 MG/DL (ref 70–110)
GLUCOSE BLD STRIP.AUTO-MCNC: 92 MG/DL (ref 70–110)
HBA1C MFR BLD: 5.8 % (ref 4.2–5.6)
HCO3 BLD-SCNC: 25.2 MMOL/L (ref 22–26)
INSPIRATION.DURATION SETTING TIME VENT: 0.9 SEC
L PNEUMO AG UR QL IA: NEGATIVE
LVOT MG: 1.59 MMHG
O2/TOTAL GAS SETTING VFR VENT: 0.9 %
PCO2 BLD: 50.7 MMHG (ref 35–45)
PEEP RESPIRATORY: 5 CMH2O
PH BLD: 7.31 [PH] (ref 7.35–7.45)
PIP ISTAT,IPIP: 21
PO2 BLD: 453 MMHG (ref 80–100)
Q-T INTERVAL, ECG07: 356 MS
QRS DURATION, ECG06: 84 MS
QTC CALCULATION (BEZET), ECG08: 452 MS
SAO2 % BLD: 100 % (ref 92–97)
SERVICE CMNT-IMP: ABNORMAL
SPECIMEN TYPE: ABNORMAL
TOTAL RESP. RATE, ITRR: 24
TROPONIN I SERPL-MCNC: <0.02 NG/ML (ref 0–0.04)
VENTILATION MODE VENT: ABNORMAL
VENTRICULAR RATE, ECG03: 97 BPM
VOLUME CONTROL PLUS IVLCP: YES
VT SETTING VENT: 430 ML

## 2021-02-27 PROCEDURE — 77010033678 HC OXYGEN DAILY

## 2021-02-27 PROCEDURE — C8929 TTE W OR WO FOL WCON,DOPPLER: HCPCS

## 2021-02-27 PROCEDURE — 74011250636 HC RX REV CODE- 250/636: Performed by: EMERGENCY MEDICINE

## 2021-02-27 PROCEDURE — 36415 COLL VENOUS BLD VENIPUNCTURE: CPT

## 2021-02-27 PROCEDURE — 74011250636 HC RX REV CODE- 250/636: Performed by: INTERNAL MEDICINE

## 2021-02-27 PROCEDURE — 77030040392 HC DRSG OPTIFOAM MDII -A

## 2021-02-27 PROCEDURE — 74011000250 HC RX REV CODE- 250: Performed by: INTERNAL MEDICINE

## 2021-02-27 PROCEDURE — 77030040393 HC DRSG OPTIFOAM GENT MDII -B

## 2021-02-27 PROCEDURE — 93005 ELECTROCARDIOGRAM TRACING: CPT

## 2021-02-27 PROCEDURE — 65610000006 HC RM INTENSIVE CARE

## 2021-02-27 PROCEDURE — 36592 COLLECT BLOOD FROM PICC: CPT

## 2021-02-27 PROCEDURE — 87449 NOS EACH ORGANISM AG IA: CPT

## 2021-02-27 PROCEDURE — 36600 WITHDRAWAL OF ARTERIAL BLOOD: CPT

## 2021-02-27 PROCEDURE — 82803 BLOOD GASES ANY COMBINATION: CPT

## 2021-02-27 PROCEDURE — 74011636637 HC RX REV CODE- 636/637: Performed by: INTERNAL MEDICINE

## 2021-02-27 PROCEDURE — 94003 VENT MGMT INPAT SUBQ DAY: CPT

## 2021-02-27 PROCEDURE — 87563 M. GENITALIUM AMP PROBE: CPT

## 2021-02-27 PROCEDURE — C9113 INJ PANTOPRAZOLE SODIUM, VIA: HCPCS | Performed by: INTERNAL MEDICINE

## 2021-02-27 PROCEDURE — 82553 CREATINE MB FRACTION: CPT

## 2021-02-27 PROCEDURE — 83036 HEMOGLOBIN GLYCOSYLATED A1C: CPT

## 2021-02-27 PROCEDURE — 82962 GLUCOSE BLOOD TEST: CPT

## 2021-02-27 PROCEDURE — 74011000258 HC RX REV CODE- 258: Performed by: EMERGENCY MEDICINE

## 2021-02-27 RX ORDER — HEPARIN SODIUM 5000 [USP'U]/ML
5000 INJECTION, SOLUTION INTRAVENOUS; SUBCUTANEOUS EVERY 8 HOURS
Status: DISCONTINUED | OUTPATIENT
Start: 2021-02-27 | End: 2021-03-03 | Stop reason: HOSPADM

## 2021-02-27 RX ORDER — INSULIN LISPRO 100 [IU]/ML
INJECTION, SOLUTION INTRAVENOUS; SUBCUTANEOUS EVERY 4 HOURS
Status: DISCONTINUED | OUTPATIENT
Start: 2021-02-27 | End: 2021-02-27

## 2021-02-27 RX ORDER — ONDANSETRON 2 MG/ML
4 INJECTION INTRAMUSCULAR; INTRAVENOUS
Status: DISCONTINUED | OUTPATIENT
Start: 2021-02-27 | End: 2021-03-03 | Stop reason: HOSPADM

## 2021-02-27 RX ORDER — DEXTROSE MONOHYDRATE 100 MG/ML
125-250 INJECTION, SOLUTION INTRAVENOUS AS NEEDED
Status: DISCONTINUED | OUTPATIENT
Start: 2021-02-27 | End: 2021-03-03 | Stop reason: HOSPADM

## 2021-02-27 RX ORDER — INSULIN LISPRO 100 [IU]/ML
INJECTION, SOLUTION INTRAVENOUS; SUBCUTANEOUS EVERY 6 HOURS
Status: DISCONTINUED | OUTPATIENT
Start: 2021-02-27 | End: 2021-03-01

## 2021-02-27 RX ORDER — PROPOFOL 10 MG/ML
0-50 VIAL (ML) INTRAVENOUS
Status: DISCONTINUED | OUTPATIENT
Start: 2021-02-28 | End: 2021-02-28

## 2021-02-27 RX ORDER — ACETAMINOPHEN 650 MG/1
650 SUPPOSITORY RECTAL
Status: DISCONTINUED | OUTPATIENT
Start: 2021-02-27 | End: 2021-03-03 | Stop reason: HOSPADM

## 2021-02-27 RX ORDER — SODIUM CHLORIDE 0.9 % (FLUSH) 0.9 %
5-40 SYRINGE (ML) INJECTION AS NEEDED
Status: DISCONTINUED | OUTPATIENT
Start: 2021-02-27 | End: 2021-03-03 | Stop reason: HOSPADM

## 2021-02-27 RX ORDER — SODIUM CHLORIDE 0.9 % (FLUSH) 0.9 %
5-40 SYRINGE (ML) INJECTION EVERY 8 HOURS
Status: DISCONTINUED | OUTPATIENT
Start: 2021-02-27 | End: 2021-03-03 | Stop reason: HOSPADM

## 2021-02-27 RX ORDER — INSULIN LISPRO 100 [IU]/ML
INJECTION, SOLUTION INTRAVENOUS; SUBCUTANEOUS
Status: DISPENSED
Start: 2021-02-27 | End: 2021-02-27

## 2021-02-27 RX ORDER — PROMETHAZINE HYDROCHLORIDE 25 MG/1
12.5 TABLET ORAL
Status: DISCONTINUED | OUTPATIENT
Start: 2021-02-27 | End: 2021-03-03 | Stop reason: HOSPADM

## 2021-02-27 RX ORDER — HEPARIN SODIUM 5000 [USP'U]/ML
INJECTION, SOLUTION INTRAVENOUS; SUBCUTANEOUS
Status: DISPENSED
Start: 2021-02-27 | End: 2021-02-27

## 2021-02-27 RX ORDER — CHLORHEXIDINE GLUCONATE 1.2 MG/ML
10 RINSE ORAL EVERY 12 HOURS
Status: DISCONTINUED | OUTPATIENT
Start: 2021-02-27 | End: 2021-02-28

## 2021-02-27 RX ORDER — POLYETHYLENE GLYCOL 3350 17 G/17G
17 POWDER, FOR SOLUTION ORAL DAILY PRN
Status: DISCONTINUED | OUTPATIENT
Start: 2021-02-27 | End: 2021-03-03 | Stop reason: HOSPADM

## 2021-02-27 RX ORDER — ACETAMINOPHEN 325 MG/1
650 TABLET ORAL
Status: DISCONTINUED | OUTPATIENT
Start: 2021-02-27 | End: 2021-03-03 | Stop reason: HOSPADM

## 2021-02-27 RX ORDER — MAGNESIUM SULFATE 100 %
4 CRYSTALS MISCELLANEOUS AS NEEDED
Status: DISCONTINUED | OUTPATIENT
Start: 2021-02-27 | End: 2021-03-03 | Stop reason: HOSPADM

## 2021-02-27 RX ADMIN — FENTANYL CITRATE 50 MCG/HR: 0.05 INJECTION, SOLUTION INTRAMUSCULAR; INTRAVENOUS at 07:20

## 2021-02-27 RX ADMIN — PROPOFOL 50 MCG/KG/MIN: 10 INJECTION, EMULSION INTRAVENOUS at 00:08

## 2021-02-27 RX ADMIN — 0.12% CHLORHEXIDINE GLUCONATE 10 ML: 1.2 RINSE ORAL at 09:43

## 2021-02-27 RX ADMIN — METHYLPREDNISOLONE SODIUM SUCCINATE 60 MG: 40 INJECTION, POWDER, FOR SOLUTION INTRAMUSCULAR; INTRAVENOUS at 06:08

## 2021-02-27 RX ADMIN — PROPOFOL 40 MCG/KG/MIN: 10 INJECTION, EMULSION INTRAVENOUS at 22:54

## 2021-02-27 RX ADMIN — PROPOFOL 40 MCG/KG/MIN: 10 INJECTION, EMULSION INTRAVENOUS at 14:29

## 2021-02-27 RX ADMIN — SODIUM BICARBONATE: 84 INJECTION, SOLUTION INTRAVENOUS at 01:53

## 2021-02-27 RX ADMIN — AZITHROMYCIN MONOHYDRATE 500 MG: 500 INJECTION, POWDER, LYOPHILIZED, FOR SOLUTION INTRAVENOUS at 21:16

## 2021-02-27 RX ADMIN — METHYLPREDNISOLONE SODIUM SUCCINATE 60 MG: 40 INJECTION, POWDER, FOR SOLUTION INTRAMUSCULAR; INTRAVENOUS at 18:11

## 2021-02-27 RX ADMIN — HEPARIN SODIUM 5000 UNITS: 5000 INJECTION INTRAVENOUS; SUBCUTANEOUS at 01:55

## 2021-02-27 RX ADMIN — INSULIN LISPRO 2 UNITS: 100 INJECTION, SOLUTION INTRAVENOUS; SUBCUTANEOUS at 01:55

## 2021-02-27 RX ADMIN — PROPOFOL 50 MCG/KG/MIN: 10 INJECTION, EMULSION INTRAVENOUS at 03:31

## 2021-02-27 RX ADMIN — HEPARIN SODIUM 5000 UNITS: 5000 INJECTION INTRAVENOUS; SUBCUTANEOUS at 09:42

## 2021-02-27 RX ADMIN — CEFTRIAXONE 1 G: 1 INJECTION, POWDER, FOR SOLUTION INTRAMUSCULAR; INTRAVENOUS at 21:16

## 2021-02-27 RX ADMIN — Medication 10 ML: at 21:21

## 2021-02-27 RX ADMIN — 0.12% CHLORHEXIDINE GLUCONATE 10 ML: 1.2 RINSE ORAL at 21:03

## 2021-02-27 RX ADMIN — SODIUM CHLORIDE 1 ML: 9 INJECTION INTRAMUSCULAR; INTRAVENOUS; SUBCUTANEOUS at 13:50

## 2021-02-27 RX ADMIN — PROPOFOL 50 MCG/KG/MIN: 10 INJECTION, EMULSION INTRAVENOUS at 10:57

## 2021-02-27 RX ADMIN — HEPARIN SODIUM 5000 UNITS: 5000 INJECTION INTRAVENOUS; SUBCUTANEOUS at 18:11

## 2021-02-27 RX ADMIN — METHYLPREDNISOLONE SODIUM SUCCINATE 60 MG: 40 INJECTION, POWDER, FOR SOLUTION INTRAMUSCULAR; INTRAVENOUS at 13:13

## 2021-02-27 RX ADMIN — PANTOPRAZOLE SODIUM 40 MG: 40 INJECTION, POWDER, FOR SOLUTION INTRAVENOUS at 09:43

## 2021-02-27 RX ADMIN — PROPOFOL 40 MCG/KG/MIN: 10 INJECTION, EMULSION INTRAVENOUS at 18:36

## 2021-02-27 RX ADMIN — PROPOFOL 50 MCG/KG/MIN: 10 INJECTION, EMULSION INTRAVENOUS at 07:16

## 2021-02-27 NOTE — ANESTHESIA PROCEDURE NOTES
Emergent Intubation  Performed by: Yusra Becerra CRNA  Authorized by: Yusra Becerra CRNA     Emergent Intubation:   Location:  ED  Date/Time:  2/26/2021 7:58 PM  Indications:  Respiratory failure  Spontaneous Ventilation: present    Level of Consciousness: awake  Preoxygenated: Yes      Airway Documentation:   Airway:  ETT - Cuffed  Technique:  Intubating stylet (RSI)  Advanced Technique:  Meadows  Insertion Site:  Oral  Blade Size:  3  ETT size (mm):  8.0  ETT Line Gustavo:  Lips  ETT Insertion depth (cm):  23  Placement verified by: auscultation, EtCO2 and BBS    Attempts:  1  Difficult airway: No    Did not attempt BMV. Grade 1 view VL. % after preoxygenation. Pulse Ox >97% throughout induction, intubation and post intubation.

## 2021-02-27 NOTE — PROGRESS NOTES
Comprehensive Nutrition Assessment    Type and Reason for Visit: Initial, NPO/clear liquid    Nutrition Recommendations/Plan: EN: start tube feeds with recc below when clinically possible      02/27/21 1517   Enteral Nutrition   Feeding Route Nasogastric   EN Formula Jevity 1.2 @ 65ml/hr   Feeding Regimen recc starting @ 10ml/hr an increase by 10ml/hr Q6 until goal rate met   Water Flushes will defer to MD   Current EN & Flush Order Provides propofol @ 22.1ml/hr provides 583kcal 65g Fat   Goal EN & Flush Order Provides 1716kcal 79g PRO 1154ml H20 242g CHO 56g Fat       Nutrition Assessment:  pt admitted w/ Acute on chronic hypercapnic hypoxic respiratory failure, COPD exacerbation        Estimated Daily Nutrient Needs:  Energy (kcal): 2268; Weight Used for Energy Requirements: Current  Protein (g): ; Weight Used for Protein Requirements: Ideal(1.2-1.5)  Fluid (ml/day): 2268; Method Used for Fluid Requirements: 1 ml/kcal      Nutrition Related Findings:  Meds: humalog, methylpredisolone, protonix, propofol Labs reviewed      Wounds:    Pressure injury, Wound consult pending(per nurse documenation awaiting wound care note)       Current Nutrition Therapies:  Current Tube Feeding (TF) Orders:   Feeding Route: (P) Nasogastric  Formula: (P) Jevity 1.2 @ 65ml/hr  Schedule:     Regimen: (P) recc starting @ 10ml/hr an increase by 10ml/hr Q6 until goal rate met  Additives/Modulars:    Water Flushes: (P) will defer to MD  Current TF & Flush Orders Provides: (P) propofol @ 22.1ml/hr provides 583kcal 65g Fat  Goal TF & Flush Orders Provides: (P) 1716kcal 79g PRO 1154ml H20 242g CHO 56g Fat    Anthropometric Measures:  Height:  5' 8\" (172.7 cm)(Simultaneous filing.  User may not have seen previous data.)  Current Body Wt:  91.6 kg (202 lb)   Admission Body Wt:       Usual Body Wt:  87.1 kg (192 lb)(2019)     Ideal Body Wt:  154 lbs:  131.2 %   Adjusted Body Weight:   ; Weight Adjustment for:     Adjusted BMI:       BMI Category:  Obese class 1 (BMI 30.0-34.9)       Nutrition Diagnosis:   Inadequate oral intake related to impaired respiratory function as evidenced by NPO or clear liquid status due to medical condition, intubation    Nutrition Interventions:   Food and/or Nutrient Delivery: Start tube feeding  Nutrition Education and Counseling: No recommendations at this time  Coordination of Nutrition Care: Continue to monitor while inpatient    Goals:  start tube feeds in the next 1-3days       Nutrition Monitoring and Evaluation:   Behavioral-Environmental Outcomes:    Food/Nutrient Intake Outcomes: Diet advancement/tolerance, Enteral nutrition intake/tolerance  Physical Signs/Symptoms Outcomes: Biochemical data, Weight, Skin, GI status    Discharge Planning:    Too soon to determine     Electronically signed by Muna Donovan on 2/27/2021 at 3:18 PM

## 2021-02-27 NOTE — PROGRESS NOTES
Problem: Non-Violent Restraints  Goal: Removal from restraints as soon as assessed to be safe  Outcome: Progressing Towards Goal  Goal: No harm/injury to patient while restraints in use  Outcome: Progressing Towards Goal  Goal: Patient's dignity will be maintained  Outcome: Progressing Towards Goal  Goal: Patient Interventions  Outcome: Progressing Towards Goal     Problem: Pressure Injury - Risk of  Goal: *Prevention of pressure injury  Description: Document Nikos Scale and appropriate interventions in the flowsheet. Outcome: Progressing Towards Goal  Note: Pressure Injury Interventions:  Sensory Interventions: Assess changes in LOC, Avoid rigorous massage over bony prominences, Float heels, Keep linens dry and wrinkle-free, Maintain/enhance activity level, Minimize linen layers, Monitor skin under medical devices, Pad between skin to skin, Turn and reposition approx. every two hours (pillows and wedges if needed), Pressure redistribution bed/mattress (bed type)    Moisture Interventions: Apply protective barrier, creams and emollients, Internal/External urinary devices, Limit adult briefs, Minimize layers, Maintain skin hydration (lotion/cream), Moisture barrier, Offer toileting Q_hr    Activity Interventions: Pressure redistribution bed/mattress(bed type)    Mobility Interventions: Pressure redistribution bed/mattress (bed type)    Nutrition Interventions: Document food/fluid/supplement intake, Discuss nutritional consult with provider    Friction and Shear Interventions: Lift sheet, Transferring/repositioning devices, Apply protective barrier, creams and emollients                Problem: Falls - Risk of  Goal: *Absence of Falls  Description: Document Darell Fall Risk and appropriate interventions in the flowsheet.   Outcome: Progressing Towards Goal  Note: Fall Risk Interventions:  Mobility Interventions: Communicate number of staff needed for ambulation/transfer, Strengthening exercises (ROM-active/passive), PT Consult for mobility concerns    Mentation Interventions: Adequate sleep, hydration, pain control, Evaluate medications/consider consulting pharmacy, Eyeglasses and hearing aids, More frequent rounding, Update white board, Toileting rounds    Medication Interventions: Evaluate medications/consider consulting pharmacy    Elimination Interventions: Patient to call for help with toileting needs, Toileting schedule/hourly rounds

## 2021-02-27 NOTE — CONSULTS
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.9    Chronic renal disease, stage 3, moderately decreased glomerular filtration rate between 30-59 mL/min/1.73 square meter N18.30    Asbestosis ([facility] [address] J61    Acute exacerbation of chronic obstructive pulmonary disease (COPD) ([facility] [address] J44.1    Debility R53.81    Paroxysmal atrial fibrillation ([facility]) I48.0    Chronic respiratory failure with hypoxia ([facility]) J96.11    Tobacco dependence F17.200    Hyponatremia E87.1    Pleural effusion, right J90    COPD with acute exacerbation ([facility]) J44.1    Acute respiratory failure with hypoxia and hypercarbia ([facility]) J96.01, J96.02    Hyponatremia E87.1    Advanced care planning/counseling discussion Z71.89    Hypokalemia E87.6    COPD (chronic obstructive pulmonary disease) ([facility]) J44.9    CAP (community acquired pneumonia) J18.9    Respiratory distress R06.03    COPD exacerbation ([facility]) J44.1    Atrial fibrillation with RVR ([facility]) I48.91    Acute respiratory failure ([facility]) J96.00 ·      RECOMMENDATIONS:   Respiratory: Mech. Ventilated patients- aim to keep peak plateau pressure less than or equal to 30cm H2O.  Titrate FiO2 for goal SPO2> 91%  VAP prevention bundle and sedation bundle followed, head of the bed at 30' all times  Daily sedation holiday and assessment for weaning with SBT as tolerated  Sputum culture per ET tube if sample available  Continue bronchodilators, pulmonary hygiene care  Steroids    ID: no evidence of pneumonia  Await SARS CoV2 PCR result; Rapid test negative  Antibiotic choice: ceftriaxone, azithromycin  Blood Cultures will be followed. Deescalate antibiotic when appropriate. Lactic acid - normal    CVS: Monitor Hemodynamics  Actively titrate vasopressors aim SBP > 100 mmHg  Check ECHO    Renal: stop bicarb drip  Monitor renal functions, lytes  Replace lytes per unit protocol  CK normal    Heme: Monitor CBC  No evidence of active bleeding  Endo: Glycemic control as needed with steroid  GI: diet may start TF  Neurology: sedated with Propofol and Fentanyl  AM labs    Will defer respective systems problem management to primary and other consultant and follow patient in ICU with primary and other medical team  Further recommendations will be based on the patient's response to recommended treatment and results of the investigation ordered. Quality Care: PPI, DVT prophylaxis, HOB elevated, Infection control all reviewed and addressed. PAIN AND SEDATION: Fentanyl, Propofol  Skin/Wound: none  Prophylaxis: DVT and GI Prophylaxis reviewed. Restraints: Wrist soft restraints for patient interfering with medical therapy/management and patient safety. PT/OT eval and treat: as needed when stable   Lines/Tubes: Central Line, Humphreys Bundle and Vent Bundles will be followed, Vent Day 1   ETT: 2/26/21. OGT/NGT: 2/26/21. Central line: 2/26/21 (site examined, no erythema, induration, discharge or sign of infection. Dressing intact. Medically necessary, will remove it when not needed. Central line bundle followed). Humphreys: 2/26/21 (Medically necessary for strict input/output monitoring in critically ill patient, will remove it when not needed. Humphreys bundle followed). ADVANCE DIRECTIVE: full code. Palliative care consult per clinical course  DISCUSSION: Events and notes from last 24 hours reviewed.  Care plan discussed with nursing, hospitalist, RT    Quality Care: PPI, DVT prophylaxis, HOB elevated, Infection control all reviewed and addressed. High complexity decision making was performed during the evaluation of this patient at high risk for decompensation with multiple organ involvement. Total critical care time spent rendering complex decision making and > 50% time spent in face to face evaluation exclusive of procedures/family discussion/coordination of care: 50 minutes. Subjective/History:   Mr. Michelle Ford has been seen and evaluated as Dr. Ananth Burnett requested for assisting with Acute on chronic hypercapnic hypoxic respiratory failure, COPD exacerbation. The patient can not provide additional history due to Ventilated and sedated. Patient is a 66 y.o. male with following PMHx presented via EMS with progressive worsening in his SOB, tripoding and failed to respond to NRB, HFNC and remained obtunded requiring intubation and ventilator support. He was recently discharged from [facility] 3 weeks ago after COPD exacerbation related admission. He followed with Dr. Osorio Martin in our office. Poor adherence to treatments. In ER, SARS CoV2 rapid negative; CXR no pulmonary infiltrates. After intubation required multiple sedatives for ventilator synchrony. Developed hypotension requiring vasopressor support. CVC placed by ER provider. No reported fever, chills, sore throat per staff. Other information limited. Pt seen in ICU at bedside rm 105 in droplet isolation. Review of Systems:  Review of systems not obtained due to patient factors.                 Latest lactic acid:   Lactic acid   Date Value Ref Range Status   08/04/2020 1.3 0.4 - 2.0 MMOL/L Final   06/13/2020 1.2 0.4 - 2.0 MMOL/L Final   04/09/2020 1.1 0.4 - 2.0 MMOL/L Final       Past Medical History:  Past Medical History:   Diagnosis Date    Brain aneurysm     Cancer ([facility] [address]     prostate    COPD (chronic obstructive pulmonary disease) ([facility] [address]     Hypertension     Nocturia     Pneumonia     Radiation effect         Past Surgical History:  Past Surgical History: Procedure Laterality Date    HX HERNIA REPAIR          Medications:  Prior to Admission medications    Medication Sig Start Date End Date Taking? Authorizing Provider   predniSONE (STERAPRED) 5 mg dose pack See administration instruction per 5mg dose pack 2/7/21   Anai Jordan MD   budesonide (PULMICORT) 0.5 mg/2 mL nbsp 2 mL by Nebulization route two (2) times a day. 2/7/21   Anai Jordan MD   albuterol-ipratropium (DUO-NEB) 2.5 mg-0.5 mg/3 ml nebu 3 mL by Nebulization route every four (4) hours as needed for Wheezing. 2/7/21   Anai Jodran MD   albuterol (PROVENTIL HFA, VENTOLIN HFA, PROAIR HFA) 90 mcg/actuation inhaler Take 2 Puffs by inhalation every four (4) hours as needed for Wheezing or Shortness of Breath. 2/7/21   Anai Jordan MD   arformoteroL (BROVANA) 15 mcg/2 mL nebu neb solution 2 mL by Nebulization route two (2) times a day. 2/7/21   Anai Jordan MD   doxycycline (MONODOX) 100 mg capsule Take 1 Cap by mouth two (2) times a day. 2/7/21   Anai Jordan MD   OXYGEN-AIR DELIVERY SYSTEMS 2 L/min by Nasal route continuous. Provider, Historical   furosemide (Lasix) 20 mg tablet Take 20 mg by mouth daily. Provider, Historical   dilTIAZem (CARDIZEM) 30 mg tablet Take 1 Tab by mouth Before breakfast, lunch, and dinner. Patient taking differently: Take 30 mg by mouth daily. Daily 4/14/20   Radha Sanchez MD   acetaminophen (TYLENOL) 325 mg tablet Take 650 mg by mouth every eight (8) hours as needed for Pain. Provider, Historical   dextran 70/hypromellose (ARTIFICIAL TEARS, PF, OP) Apply 2 Drops to eye as needed for Other (both eyes for dryness). Provider, Historical   diphenhydrAMINE (BENADRYL) 25 mg capsule Take 25 mg by mouth nightly as needed for Itching. Provider, Historical   tamsulosin (FLOMAX) 0.4 mg capsule Take 0.4 mg by mouth every evening.     Other, Phys, MD       Current Facility-Administered Medications   Medication Dose Route Frequency    azithromycin (ZITHROMAX) 500 mg in 0.9% sodium chloride 250 mL (VIAL-MATE)  500 mg IntraVENous Q24H    sodium bicarbonate (8.4%) 50 mEq in dextrose 5% 1,000 mL infusion   IntraVENous CONTINUOUS    methylPREDNISolone (PF) (SOLU-MEDROL) injection 60 mg  60 mg IntraVENous Q6H    insulin lispro (HUMALOG) injection   SubCUTAneous Q4H    heparin (porcine) injection 5,000 Units  5,000 Units SubCUTAneous Q8H    propofol (DIPRIVAN) 10 mg/mL infusion  7 mcg/kg/min IntraVENous TITRATE    NOREPINephrine (LEVOPHED) 8 mg in 0.9% NS 250ml infusion  4 mcg/min IntraVENous TITRATE    fentaNYL (PF) 900 mcg/30 ml infusion soln  0-200 mcg/hr IntraVENous TITRATE    vecuronium (NORCURON) injection 10 mg  10 mg IntraVENous ONCE    cefTRIAXone (ROCEPHIN) 1 g in sterile water (preservative free) 10 mL IV syringe  1 g IntraVENous Q24H    pantoprazole (PROTONIX) 40 mg in 0.9% sodium chloride 10 mL injection  40 mg IntraVENous DAILY       Allergy:  Allergies   Allergen Reactions    Aspirin Other (comments)     \"messes my stomach up\"        Social History:  Social History     Tobacco Use    Smoking status: Former Smoker     Types: Cigarettes    Smokeless tobacco: Never Used   Substance Use Topics    Alcohol use: No    Drug use: Never        Family History:  Family History   Problem Relation Age of Onset    Heart Disease Mother           Objective:   Vital Signs:    Blood pressure (!) 187/73, pulse 73, temperature 97.4 °F (36.3 °C), resp. rate 24, height 5' 8\" (1.727 m), weight 91.9 kg (202 lb 9.6 oz), SpO2 100 %. Body mass index is 30.81 kg/m².    O2 Device: Endotracheal tube, Ventilator   O2 Flow Rate (L/min): 60 l/min   Temp (24hrs), Av.6 °F (37 °C), Min:97.4 °F (36.3 °C), Max:99 °F (37.2 °C)         Intake/Output:   Last shift:       07 - 1900  In: -   Out: 100   Last 3 shifts: 1901 - 700  In: 1752.9 [I.V.:1752.9]  Out: -     Intake/Output Summary (Last 24 hours) at 2021 6484  Last data filed at 2/27/2021 0711  Gross per 24 hour   Intake 1752.92 ml   Output 100 ml   Net 1652.92 ml       Ventilator Settings:  Mode Rate Tidal Volume Pressure FiO2 PEEP   Assist control, VC+   430 ml    40 % 5 cm H20     Peak airway pressure: 21 cm H2O    Minute ventilation: 10.6 l/min      Lung protective strategy, Pl pressure goals less than or equal to 30. Physical Exam:  General: sedated, in no respiratory distress and acyanotic, appears older than stated age, on ventilator  HEENT: PERRL, fundi benign, orally intubated  Neck: No abnormally enlarged lymph nodes or thyroid, supple  Chest: increased AP diameter  Lungs: moderate air entry, no end expiratory wheeze, few rhonchi scattered bilaterally, breathing normal , normal percussion anterior chest wall bilaterally, no tenderness/ rash  Heart: Regular rate and rhythm, S1S2 present or without murmur or extra heart sounds  Abdomen: non distended, bowel sounds normoactive, tympanic, abdomen is soft without significant tenderness, masses, organomegaly or guarding, rigidity, rebound  Extremity: negative for edema, cyanosis, clubbing  Capillary refill: normal  Neuro: sedated, moves all extremities to deep touch, no involuntary movements, exam limitation on ventilator and sedation  Skin: Skin color, texture, turgor fair.  Skin dry, warm, non-diaphoretic    Data:     Recent Results (from the past 24 hour(s))   CULTURE, BLOOD    Collection Time: 02/26/21  7:00 PM    Specimen: Blood   Result Value Ref Range    Special Requests: NO SPECIAL REQUESTS      Culture result: NO GROWTH AFTER 11 HOURS     COVID-19 RAPID TEST    Collection Time: 02/26/21  7:10 PM   Result Value Ref Range    Specimen source Nasopharyngeal      COVID-19 rapid test Not detected NOTD     EKG, 12 LEAD, INITIAL    Collection Time: 02/26/21  7:10 PM   Result Value Ref Range    Ventricular Rate 121 BPM    Atrial Rate 121 BPM    P-R Interval 166 ms    QRS Duration 78 ms    Q-T Interval 290 ms QTC Calculation (Bezet) 411 ms    Calculated P Axis 71 degrees    Calculated R Axis 35 degrees    Calculated T Axis 77 degrees    Diagnosis       Poor data quality, interpretation may be adversely affected  Sinus tachycardia  Possible Left atrial enlargement  Cannot rule out Anterior infarct , age undetermined  Abnormal ECG  Confirmed by Trung Bhatt MD, [facility] ([id number]) on 2/26/2021 10:31:21 PM     POC LACTIC ACID    Collection Time: 02/26/21  7:12 PM   Result Value Ref Range    Lactic Acid (POC) 0.94 0.40 - 2.00 mmol/L   CBC WITH AUTOMATED DIFF    Collection Time: 02/26/21  7:15 PM   Result Value Ref Range    WBC 11.3 4.6 - 13.2 K/uL    RBC 4.11 (L) 4.70 - 5.50 M/uL    HGB 11.7 (L) 13.0 - 16.0 g/dL    HCT 36.4 36.0 - 48.0 %    MCV 88.6 74.0 - 97.0 FL    MCH 28.5 24.0 - 34.0 PG    MCHC 32.1 31.0 - 37.0 g/dL    RDW 14.0 11.6 - 14.5 %    PLATELET 603 476 - 902 K/uL    MPV 8.8 (L) 9.2 - 11.8 FL    NEUTROPHILS 53 40 - 73 %    LYMPHOCYTES 30 21 - 52 %    MONOCYTES 9 3 - 10 %    EOSINOPHILS 7 (H) 0 - 5 %    BASOPHILS 1 0 - 2 %    ABS. NEUTROPHILS 6.1 1.8 - 8.0 K/UL    ABS. LYMPHOCYTES 3.4 0.9 - 3.6 K/UL    ABS. MONOCYTES 1.0 0.05 - 1.2 K/UL    ABS. EOSINOPHILS 0.8 (H) 0.0 - 0.4 K/UL    ABS. BASOPHILS 0.1 0.0 - 0.1 K/UL    DF AUTOMATED     METABOLIC PANEL, COMPREHENSIVE    Collection Time: 02/26/21  7:15 PM   Result Value Ref Range    Sodium 136 136 - 145 mmol/L    Potassium 4.2 3.5 - 5.5 mmol/L    Chloride 101 100 - 111 mmol/L    CO2 29 21 - 32 mmol/L    Anion gap 6 3.0 - 18 mmol/L    Glucose 177 (H) 74 - 99 mg/dL    BUN 23 (H) 7.0 - 18 MG/DL    Creatinine 1.49 (H) 0.6 - 1.3 MG/DL    BUN/Creatinine ratio 15 12 - 20      GFR est AA 55 (L) >60 ml/min/1.73m2    GFR est non-AA 46 (L) >60 ml/min/1.73m2    Calcium 8.9 8.5 - 10.1 MG/DL    Bilirubin, total 0.3 0.2 - 1.0 MG/DL    ALT (SGPT) 23 16 - 61 U/L    AST (SGOT) 13 10 - 38 U/L    Alk.  phosphatase 129 (H) 45 - 117 U/L    Protein, total 6.8 6.4 - 8.2 g/dL    Albumin 3.4 3.4 - 5.0 g/dL    Globulin 3.4 2.0 - 4.0 g/dL    A-G Ratio 1.0 0.8 - 1.7     CARDIAC PANEL,(CK, CKMB & TROPONIN)    Collection Time: 02/26/21  7:15 PM   Result Value Ref Range    CK - MB 3.1 <3.6 ng/ml    CK-MB Index 4.8 (H) 0.0 - 4.0 %    CK 64 39 - 308 U/L    Troponin-I, QT <0.02 0.0 - 0.045 NG/ML   NT-PRO BNP    Collection Time: 02/26/21  7:15 PM   Result Value Ref Range    NT pro-BNP 89 0 - 1,800 PG/ML   MAGNESIUM    Collection Time: 02/26/21  7:15 PM   Result Value Ref Range    Magnesium 2.1 1.6 - 2.6 mg/dL   CULTURE, BLOOD    Collection Time: 02/26/21  7:15 PM    Specimen: Blood   Result Value Ref Range    Special Requests: NO SPECIAL REQUESTS      Culture result: NO GROWTH AFTER 11 HOURS     POC G3    Collection Time: 02/26/21  8:51 PM   Result Value Ref Range    Device: VENT      FIO2 (POC) 1.0 %    pH (POC) 7.06 (LL) 7.35 - 7.45      pCO2 (POC) 99.8 (H) 35.0 - 45.0 MMHG    pO2 (POC) 82 80 - 100 MMHG    HCO3 (POC) 28.2 (H) 22 - 26 MMOL/L    sO2 (POC) 89 (L) 92 - 97 %    Base deficit (POC) 2 mmol/L    Mode ASSIST CONTROL      Tidal volume 450 ml    Set Rate 18 bpm    PEEP/CPAP (POC) 5 cmH2O    Allens test (POC) YES      Total resp. rate 18      Site RIGHT RADIAL      Patient temp.  98.6      Specimen type (POC) ARTERIAL      Performed by Kristina Levy     Volume control YES     EKG, 12 LEAD, INITIAL    Collection Time: 02/27/21 12:51 AM   Result Value Ref Range    Ventricular Rate 97 BPM    Atrial Rate 394 BPM    QRS Duration 84 ms    Q-T Interval 356 ms    QTC Calculation (Bezet) 452 ms    Calculated R Axis 49 degrees    Calculated T Axis 50 degrees    Diagnosis       Atrial fibrillation with premature ventricular or aberrantly conducted   complexes  Abnormal ECG  When compared with ECG of 26-FEB-2021 19:10,  Atrial fibrillation has replaced Sinus rhythm  ST no longer depressed in Anterior leads     POC G3    Collection Time: 02/27/21  1:11 AM   Result Value Ref Range    Device: VENT      FIO2 (POC) 0.9 %    pH (POC) 7.31 (L) 7.35 - 7.45      pCO2 (POC) 50.7 (H) 35.0 - 45.0 MMHG    pO2 (POC) 453 (H) 80 - 100 MMHG    HCO3 (POC) 25.2 22 - 26 MMOL/L    sO2 (POC) 100 (H) 92 - 97 %    Base deficit (POC) 1 mmol/L    Mode ASSIST CONTROL      Tidal volume 430 ml    Set Rate 24 bpm    PEEP/CPAP (POC) 5 cmH2O    PIP (POC) 21      Allens test (POC) N/A      Inspiratory Time 0.9 sec    Total resp.  rate 24      Site RIGHT RADIAL      Specimen type (POC) ARTERIAL      Performed by Quintin Isaacs     Volume control plus YES     CARDIAC PANEL,(CK, CKMB & TROPONIN)    Collection Time: 02/27/21  1:44 AM   Result Value Ref Range    CK - MB 3.6 (H) <3.6 ng/ml    CK-MB Index 5.5 (H) 0.0 - 4.0 %    CK 66 39 - 308 U/L    Troponin-I, QT <0.02 0.0 - 0.045 NG/ML   GLUCOSE, POC    Collection Time: 02/27/21  1:49 AM   Result Value Ref Range    Glucose (POC) 152 (H) 70 - 110 mg/dL   GLUCOSE, POC    Collection Time: 02/27/21  6:00 AM   Result Value Ref Range    Glucose (POC) 92 70 - 110 mg/dL           Recent Labs     02/27/21  0111 02/26/21 2051   FIO2I 0.9 1.0   HCO3I 25.2 28.2*   PCO2I 50.7* 99.8*   PHI 7.31* 7.06*   PO2I 453* 82       All Micro Results     Procedure Component Value Units Date/Time    CULTURE, BLOOD [[id number]] Collected: 02/26/21 1900    Order Status: Completed Specimen: Blood Updated: 02/27/21 0748     Special Requests: NO SPECIAL REQUESTS        Culture result: NO GROWTH AFTER 11 HOURS       CULTURE, BLOOD [[id number]] Collected: 02/26/21 1915    Order Status: Completed Specimen: Blood Updated: 02/27/21 0748     Special Requests: NO SPECIAL REQUESTS        Culture result: NO GROWTH AFTER 11 HOURS       LEGIONELLA PNEUMOPHILA AG, URINE [[id number]] Collected: 02/27/21 0550    Order Status: Completed Specimen: Urine Updated: 02/27/21 0610    MYCOPLASMA GENITALIUM, ANKITA, URINE [[id number]] Collected: 02/27/21 0550    Order Status: Completed Specimen: Urine Updated: 02/27/21 8943    COVID-19 RAPID TEST [[id number]] Collected: 02/26/21 1910 Order Status: Completed Specimen: Nasopharyngeal Updated: 02/26/21 1937     Specimen source Nasopharyngeal        COVID-19 rapid test Not detected        Comment: Rapid Abbott ID Now       Rapid NAAT:  The specimen is NEGATIVE for SARS-CoV-2, the novel coronavirus associated with COVID-19. Negative results should be treated as presumptive and, if inconsistent with clinical signs and symptoms or necessary for patient management, should be tested with an alternative molecular assay. Negative results do not preclude SARS-CoV-2 infection and should not be used as the sole basis for patient management decisions. This test has been authorized by the FDA under an Emergency Use Authorization (EUA) for use by authorized laboratories. Fact sheet for Healthcare Providers: [employer]  Fact sheet for Patients: [employer]       Methodology: Isothermal Nucleic Acid Amplification               Telemetry: normal sinus rhythm    6/15/21 ECHO  · Image quality for this study was technically difficult. Contrast used: DEFINITY. · Normal cavity size, wall thickness and systolic function (ejection fraction normal). Estimated left ventricular ejection fraction is 60 - 65%. Mild (grade 1) left ventricular diastolic dysfunction E/E' ratio is 11. 16.  · Mildly dilated left atrium. · Mildly dilated right ventricle. · Mildly dilated right atrium. · Probably trileaflet aortic valve. · Mitral valve non-specific thickening. Mild mitral annular calcification. Trace mitral valve regurgitation is present. · Pulmonary arterial systolic pressure is 25 mmHg. Pulmonary hypertension not suggested by Doppler findings. · Severely elevated central venous pressure (15+ mmHg); IVC diameter is larger than 21 mm and collapses less than 50% with respiration.     Imaging:  [x]I have personally reviewed the patients chest radiographs images and report   2/26/21  CHEST RADIOGRAPH   CLINICAL INDICATION/HISTORY: ACUTE RESP DISTRESS  -Additional: None   COMPARISON: 2/2/2021   TECHNIQUE: Portable frontal view of the chest   _______________   FINDINGS:   SUPPORT DEVICES: Endotracheal tube 6.8 cm above the juliana.   HEART AND MEDIASTINUM: No appreciable cardiomegaly. Remaining mediastinal  contours within normal limits.   LUNGS AND PLEURAL SPACES: The calcified pleural plaque remains. Parenchymal  scarring is seen in the upper and lower lobe on the right about the same. Left  lung appears clear. No effusion. No pneumothorax.   BONY THORAX AND SOFT TISSUES: Unremarkable.   _______________   IMPRESSION   1. No significant change other than Saqib of endotracheal tube, which appears  in satisfactory position        Results from Hospital Encounter encounter on 02/26/21   XR ABD (KUB)    Narrative History: Tube placement. COMPARISON: 7/7/2019    Single portable view supine of the abdomen performed. Visualized bowel gas  appears unremarkable. There is a nasogastric-type tube with its tip and sidehole  in the region of the distal stomach. Impression NG tube in the distal stomach. Results from [place] encounter on 05/29/20   CT LOW DOSE LUNG CANCER SCREENING    Narrative EXAM: CT Chest, lung cancer screening    CLINICAL INDICATION/HISTORY: Lung cancer screening, cigarette/nicotine  dependence. COMPARISON: CTA of the chest 10/31/2019. TECHNIQUE: Chest CT from thoracic inlet through the diaphragm without contrast.  A low-dose lung cancer screening protocol was performed. Note that visualization  of non-pulmonary soft tissue structures is limited with this protocol. Coronal  and sagittal reformats were generated and reviewed. One or more dose reduction  techniques were used on this CT: automated exposure control, adjustment of the  mAs and/or kVp according to patient size, and iterative reconstruction  techniques.   The specific techniques used on this CT exam have been documented  in the patient's electronic medical record. Digital Imaging and Communications  in Medicine (DICOM) format image data are available to nonaffiliated external  healthcare facilities or entities on a secure, media free, reciprocally  searchable basis with patient authorization for at least a 12-month period after  this study. DLP: 67    _______________    FINDINGS:    LUNGS:    Nodules: No suspicious nodule. Other pulmonary findings:  Focally calcified area of soft tissue thickening along the right pleural margin  both anteriorly and posteriorly is unchanged in appearance since prior  examination. OTHER:     Coronary artery ossifications are present.     _______________        Impression IMPRESSION:    No suspicious pulmonary nodule. Focal areas of peripheral pleural thickening  with peripheral calcification is unchanged in appearance since prior  examinations. Lung-RADS 1 - Negative. Management: Continue annual screening with low dose CT in 12 months. Lung cancer screening categorization and recommendations per the [employer] of radiology Lung-RADS version 1.1.          [x]See my orders for details          Cesar Gabriel MD

## 2021-02-27 NOTE — PROGRESS NOTES
Problem: Non-Violent Restraints  Goal: Removal from restraints as soon as assessed to be safe  Outcome: Progressing Towards Goal  Goal: No harm/injury to patient while restraints in use  Outcome: Progressing Towards Goal  Goal: Patient's dignity will be maintained  Outcome: Progressing Towards Goal  Goal: Patient Interventions  Outcome: Progressing Towards Goal     Problem: Pressure Injury - Risk of  Goal: *Prevention of pressure injury  Description: Document Nikos Scale and appropriate interventions in the flowsheet. Outcome: Progressing Towards Goal  Note: Pressure Injury Interventions:  Sensory Interventions: Assess changes in LOC, Avoid rigorous massage over bony prominences, Float heels, Keep linens dry and wrinkle-free, Maintain/enhance activity level, Minimize linen layers, Monitor skin under medical devices, Pad between skin to skin, Turn and reposition approx.  every two hours (pillows and wedges if needed), Pressure redistribution bed/mattress (bed type)    Moisture Interventions: Apply protective barrier, creams and emollients, Internal/External urinary devices, Limit adult briefs, Minimize layers, Maintain skin hydration (lotion/cream), Moisture barrier, Offer toileting Q_hr    Activity Interventions: Pressure redistribution bed/mattress(bed type)    Mobility Interventions: Pressure redistribution bed/mattress (bed type)    Nutrition Interventions: Document food/fluid/supplement intake, Discuss nutritional consult with provider    Friction and Shear Interventions: Lift sheet, Transferring/repositioning devices, Apply protective barrier, creams and emollients

## 2021-02-27 NOTE — PROGRESS NOTES
Hospitalist Progress Note    Patient: Abhinav Tobar MRN: [id number]  CSN: [id number]    YOB: 1945  Age: 66 y.o.   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.9    Chronic renal disease, stage 3, moderately decreased glomerular filtration rate between 30-59 mL/min/1.73 square meter N18.30    Asbestosis ([facility] J61    Acute exacerbation of chronic obstructive pulmonary disease (COPD) ([facility] J44.1    Debility R53.81    Paroxysmal atrial fibrillation (HCC) I48.0    Chronic respiratory failure with hypoxia ([facility]) J96.11    Tobacco dependence F17.200    Hyponatremia E87.1    Pleural effusion, right J90    COPD with acute exacerbation ([address] J44.1    Acute respiratory failure with hypoxia and hypercarbia (HCC) J96.01, J96.02    Hyponatremia E87.1    Advanced care planning/counseling discussion Z71.89    Hypokalemia E87.6    COPD (chronic obstructive pulmonary disease) (HCC) J44.9    CAP (community acquired pneumonia) J18.9    Respiratory distress R06.03    COPD exacerbation (HCC) J44.1    Atrial fibrillation with RVR ([facility]) I48.91    Acute respiratory failure ([facility]) J96.00        IMPRESSION and Plan:    Abhinav Tobar is a 66 y.o. male with   Patient Active Problem List    Diagnosis Date Noted    Acute respiratory failure ([address] 02/26/2021    Respiratory distress 02/02/2021    COPD exacerbation ([address] 02/02/2021    Atrial fibrillation with RVR ([address] 02/02/2021    COPD (chronic obstructive pulmonary disease) ([address] 08/05/2020    CAP (community acquired pneumonia) 08/05/2020    Hypokalemia 04/14/2020    Advanced care planning/counseling discussion     Hyponatremia 04/10/2020    COPD with acute exacerbation ([address] 04/09/2020    Acute respiratory failure with hypoxia and hypercarbia (HCC) 04/09/2020    Pleural effusion, right 02/22/2020    Tobacco dependence 02/21/2020    Hyponatremia 02/21/2020    Paroxysmal atrial fibrillation ([facility] 08/19/2019    Chronic respiratory failure with hypoxia ([facility] 08/19/2019    Debility 07/08/2019    Acute exacerbation of chronic obstructive pulmonary disease (COPD) ([facility] 02/08/2019    Asbestosis ([facility] 10/19/2018    Chronic renal disease, stage 3, moderately decreased glomerular filtration rate between 30-59 mL/min/1.73 square meter 07/20/2018    COPD (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 ([facility] 04/20/2018    Hypertension      Principal Problem:    Acute respiratory failure ([facility] (2/26/2021)     plan  Vent per ICU  Await COVID   Labs as ordered  Echo pending  Fu lab as ordered      Patient's condition is guarded          Recommend to continue hospitalization. Discussed with patient. Chief Complaints:   Chief Complaint   Patient presents with    Shortness of Breath     SUBJECTIVE:  Pt is seen and examined.   Chart revivewed     On vent      Review of systems:    Review of Systems   Unable to perform ROS: Intubated       PE:  Patient Vitals for the past 24 hrs:   BP Temp Pulse Resp SpO2 Height Weight   02/27/21 1200 (!) 162/69 -- 68 18 98 % -- --   02/27/21 1132 -- -- 67 18 100 % -- --   02/27/21 1130 (!) 154/67 -- 68 18 99 % -- --   02/27/21 1100 (!) 151/63 -- 73 21 99 % -- --   02/27/21 1030 (!) 170/72 -- 78 18 100 % -- --   02/27/21 1000 (!) 169/69 -- 69 11 99 % -- --   02/27/21 0930 (!) 166/67 -- 68 18 100 % -- --   02/27/21 0904 -- -- -- -- 100 % -- --   02/27/21 0900 (!) 166/84 -- 88 19 100 % -- --   02/27/21 0830 (!) 187/73 -- 73 24 100 % -- --   02/27/21 0800 (!) 182/72 -- 60 24 100 % -- --   02/27/21 0734 -- -- (!) 58 24 100 % -- --   02/27/21 0730 (!) 89/45 -- 65 24 100 % -- --   02/27/21 0700 (!) 85/41 -- 75 24 100 % -- --   02/27/21 0645 (!) 92/46 -- 83 21 100 % -- --   02/27/21 0630 (!) 148/64 -- 78 18 100 % -- --   02/27/21 0615 (!) 158/64 -- 78 14 100 % -- --   02/27/21 0613 -- 97.4 °F (36.3 °C) -- -- -- -- --   02/27/21 0600 (!) 168/63 -- 90 19 100 % -- --   02/27/21 0553 -- -- 83 24 100 % -- --   02/27/21 0545 (!) 176/80 -- 68 23 100 % -- --   02/27/21 0530 (!) 176/73 -- 76 24 100 % -- --   02/27/21 0515 (!) 158/71 -- 71 24 100 % -- --   02/27/21 0500 (!) 166/69 -- 74 24 100 % -- --   02/27/21 0445 (!) 149/69 98.8 °F (37.1 °C) 76 26 100 % -- --   02/27/21 0430 (!) 148/71 -- 70 20 100 % -- --   02/27/21 0415 (!) 152/82 -- 100 15 100 % -- --   02/27/21 0400 (!) 160/77 -- 81 24 100 % -- --   02/27/21 0345 (!) 148/68 -- 79 23 100 % -- --   02/27/21 0330 (!) 165/109 -- 84 24 100 % -- --   02/27/21 0315 (!) 159/103 -- (!) 117 24 100 % -- --   02/27/21 0300 (!) 151/89 -- (!) 119 24 100 % -- --   02/27/21 0245 (!) 159/74 -- (!) 121 24 100 % -- --   02/27/21 0230 (!) 156/67 -- (!) 114 24 100 % -- --   02/27/21 0215 (!) 151/81 -- 99 24 100 % -- --   02/27/21 0200 (!) 162/76 -- 100 24 100 % -- --   02/27/21 0149 -- 98.8 °F (37.1 °C) (!) 110 24 100 % -- --   02/27/21 0145 -- -- (!) 113 19 100 % -- --   02/27/21 0130 -- -- (!) 113 16 100 % -- --   02/27/21 0119 -- -- 97 24 100 % -- --   02/27/21 0115 -- -- (!) 112 17 100 % -- --   02/27/21 0100 (!) 153/85 -- (!) 114 13 100 % -- --   02/27/21 0045 -- -- (!) 107 27 100 % -- --   02/27/21 0030 (!) 163/73 98.8 °F (37.1 °C) 92 21 100 % -- --   02/27/21 0015 -- -- 62 24 100 % -- --   02/27/21 0000 (!) 156/83 -- 73 21 100 % -- --   02/26/21 2345 135/69 -- 89 14 100 % -- --   02/26/21 2334 -- -- 94 12 100 % -- --   02/26/21 2333 -- -- 95 12 100 % -- --   02/26/21 2332 -- -- 93 13 100 % -- --   02/26/21 2331 -- -- 92 17 100 % -- --   02/26/21 2330 (!) 140/67 -- 92 21 100 % -- --   02/26/21 2329 -- -- 90 17 100 % -- --   02/26/21 2328 -- -- 89 17 100 % -- --   02/26/21 2327 -- -- 89 20 100 % -- --   02/26/21 2326 -- -- 89 11 100 % -- --   02/26/21 2325 -- -- 90 11 100 % -- --   02/26/21 2324 -- -- 89 -- -- -- --   02/26/21 2323 (!) 120/56 -- -- -- -- -- --   02/26/21 2310 (!) 143/60 -- (!) 102 18 100 % -- --   02/26/21 2305 (!) 160/60 -- (!) 107 23 -- -- --   02/26/21 2300 138/67 -- (!) 101 24 100 % -- --   02/26/21 2215 (!) 121/51 -- (!) 105 25 100 % -- --   02/26/21 2210 (!) 148/53 -- (!) 108 24 100 % -- --   02/26/21 2200 (!) 142/50 -- (!) 103 26 100 % -- --   02/26/21 2155 (!) 133/51 -- (!) 101 21 100 % -- --   02/26/21 2150 (!) 123/46 -- 99 24 100 % -- --   02/26/21 2145 (!) 121/51 -- 96 22 100 % -- --   02/26/21 2115 (!) 121/41 -- 99 28 100 % -- --   02/26/21 2110 (!) 107/38 -- 95 24 100 % -- --   02/26/21 2105 (!) 100/35 -- 96 24 99 % -- --   02/26/21 2100 (!) 109/36 -- 98 23 98 % -- --   02/26/21 2040 (!) 125/53 -- (!) 101 20 100 % -- --   02/26/21 2035 (!) 106/39 -- 100 22 100 % -- --   02/26/21 2030 (!) 153/129 -- (!) 101 19 99 % -- --   02/26/21 2025 (!) 139/41 -- (!) 103 19 100 % -- --   02/26/21 2020 137/65 -- (!) 104 21 96 % -- --   02/26/21 2015 (!) 154/72 -- (!) 103 22 97 % -- --   02/26/21 1958 -- -- 97 18 100 % -- --   02/26/21 1920 -- -- -- 29 -- -- --   02/26/21 1910 -- -- -- -- 97 % -- --   02/26/21 1902 (!) 183/69 99 °F (37.2 °C) (!) 112 27 92 % 5' 8\" (1.727 m) 91.9 kg (202 lb 9.6 oz)       Intake/Output Summary (Last 24 hours) at 2/27/2021 1222  Last data filed at 2/27/2021 0711  Gross per 24 hour   Intake 1752.92 ml   Output 100 ml   Net 1652.92 ml     Patient Vitals for the past 120 hrs:   Weight   02/26/21 1902 91.9 kg (202 lb 9.6 oz)         Physical Exam  Vitals signs and nursing note reviewed. Constitutional:       General: He is in acute distress. Appearance: He is ill-appearing. Neck:      Musculoskeletal: Normal range of motion and neck supple. Vascular: No JVD. Cardiovascular:      Rate and Rhythm: Normal rate and regular rhythm. Heart sounds: Normal heart sounds. Pulmonary:      Effort: Respiratory distress present. Breath sounds: Normal breath sounds.    Abdominal:      General: Bowel sounds are normal. There is no distension. Palpations: Abdomen is soft. Tenderness: There is no abdominal tenderness. There is no rebound. Musculoskeletal: Normal range of motion. Skin:     General: Skin is warm and dry. Neurological:      Mental Status: He is alert.    Psychiatric:         Mood and Affect: Affect normal.             Intake and Output:  Current Shift:  02/27 0701 - 02/27 1900  In: -   Out: 100   Last three shifts:  02/25 1901 - 02/27 0700  In: 1752.9 [I.V.:1752.9]  Out: -     Lab/Data Reviewed:  Recent Results (from the past 8 hour(s))   GLUCOSE, POC    Collection Time: 02/27/21  6:00 AM   Result Value Ref Range    Glucose (POC) 92 70 - 110 mg/dL     Medications:  Current Facility-Administered Medications   Medication Dose Route Frequency    azithromycin (ZITHROMAX) 500 mg in 0.9% sodium chloride 250 mL (VIAL-MATE)  500 mg IntraVENous Q24H    methylPREDNISolone (PF) (SOLU-MEDROL) injection 60 mg  60 mg IntraVENous Q6H    glucose chewable tablet 16 g  4 Tab Oral PRN    glucagon (GLUCAGEN) injection 1 mg  1 mg IntraMUSCular PRN    dextrose 10% infusion 125-250 mL  125-250 mL IntraVENous PRN    heparin (porcine) injection 5,000 Units  5,000 Units SubCUTAneous Q8H    chlorhexidine (PERIDEX) 0.12 % mouthwash 10 mL  10 mL Oral Q12H    insulin lispro (HUMALOG) injection   SubCUTAneous Q6H    propofol (DIPRIVAN) 10 mg/mL infusion  7 mcg/kg/min IntraVENous TITRATE    NOREPINephrine (LEVOPHED) 8 mg in 0.9% NS 250ml infusion  4 mcg/min IntraVENous TITRATE    fentaNYL (PF) 900 mcg/30 ml infusion soln  0-200 mcg/hr IntraVENous TITRATE    cefTRIAXone (ROCEPHIN) 1 g in sterile water (preservative free) 10 mL IV syringe  1 g IntraVENous Q24H    ELECTROLYTE REPLACEMENT PROTOCOL - Potassium Renal Dosing  1 Each Other PRN    ELECTROLYTE REPLACEMENT PROTOCOL  - Phosphorus Renal Dosing  1 Each Other PRN    pantoprazole (PROTONIX) 40 mg in 0.9% sodium chloride 10 mL injection  40 mg IntraVENous DAILY       Recent Results (from the past 24 hour(s))   CULTURE, BLOOD    Collection Time: 02/26/21  7:00 PM    Specimen: Blood   Result Value Ref Range    Special Requests: NO SPECIAL REQUESTS      Culture result: NO GROWTH AFTER 11 HOURS     COVID-19 RAPID TEST    Collection Time: 02/26/21  7:10 PM   Result Value Ref Range    Specimen source Nasopharyngeal      COVID-19 rapid test Not detected NOTD     EKG, 12 LEAD, INITIAL    Collection Time: 02/26/21  7:10 PM   Result Value Ref Range    Ventricular Rate 121 BPM    Atrial Rate 121 BPM    P-R Interval 166 ms    QRS Duration 78 ms    Q-T Interval 290 ms    QTC Calculation (Bezet) 411 ms    Calculated P Axis 71 degrees    Calculated R Axis 35 degrees    Calculated T Axis 77 degrees    Diagnosis       Poor data quality, interpretation may be adversely affected  Sinus tachycardia  Possible Left atrial enlargement  Cannot rule out Anterior infarct , age undetermined  Abnormal ECG  Confirmed by Wili Walker MD, [facility] ([id number]) on 2/26/2021 10:31:21 PM     POC LACTIC ACID    Collection Time: 02/26/21  7:12 PM   Result Value Ref Range    Lactic Acid (POC) 0.94 0.40 - 2.00 mmol/L   CBC WITH AUTOMATED DIFF    Collection Time: 02/26/21  7:15 PM   Result Value Ref Range    WBC 11.3 4.6 - 13.2 K/uL    RBC 4.11 (L) 4.70 - 5.50 M/uL    HGB 11.7 (L) 13.0 - 16.0 g/dL    HCT 36.4 36.0 - 48.0 %    MCV 88.6 74.0 - 97.0 FL    MCH 28.5 24.0 - 34.0 PG    MCHC 32.1 31.0 - 37.0 g/dL    RDW 14.0 11.6 - 14.5 %    PLATELET 918 032 - 834 K/uL    MPV 8.8 (L) 9.2 - 11.8 FL    NEUTROPHILS 53 40 - 73 %    LYMPHOCYTES 30 21 - 52 %    MONOCYTES 9 3 - 10 %    EOSINOPHILS 7 (H) 0 - 5 %    BASOPHILS 1 0 - 2 %    ABS. NEUTROPHILS 6.1 1.8 - 8.0 K/UL    ABS. LYMPHOCYTES 3.4 0.9 - 3.6 K/UL    ABS. MONOCYTES 1.0 0.05 - 1.2 K/UL    ABS. EOSINOPHILS 0.8 (H) 0.0 - 0.4 K/UL    ABS.  BASOPHILS 0.1 0.0 - 0.1 K/UL    DF AUTOMATED     METABOLIC PANEL, COMPREHENSIVE    Collection Time: 02/26/21  7:15 PM   Result Value Ref Range    Sodium 136 136 - 145 mmol/L    Potassium 4.2 3.5 - 5.5 mmol/L    Chloride 101 100 - 111 mmol/L    CO2 29 21 - 32 mmol/L    Anion gap 6 3.0 - 18 mmol/L    Glucose 177 (H) 74 - 99 mg/dL    BUN 23 (H) 7.0 - 18 MG/DL    Creatinine 1.49 (H) 0.6 - 1.3 MG/DL    BUN/Creatinine ratio 15 12 - 20      GFR est AA 55 (L) >60 ml/min/1.73m2    GFR est non-AA 46 (L) >60 ml/min/1.73m2    Calcium 8.9 8.5 - 10.1 MG/DL    Bilirubin, total 0.3 0.2 - 1.0 MG/DL    ALT (SGPT) 23 16 - 61 U/L    AST (SGOT) 13 10 - 38 U/L    Alk. phosphatase 129 (H) 45 - 117 U/L    Protein, total 6.8 6.4 - 8.2 g/dL    Albumin 3.4 3.4 - 5.0 g/dL    Globulin 3.4 2.0 - 4.0 g/dL    A-G Ratio 1.0 0.8 - 1.7     CARDIAC PANEL,(CK, CKMB & TROPONIN)    Collection Time: 02/26/21  7:15 PM   Result Value Ref Range    CK - MB 3.1 <3.6 ng/ml    CK-MB Index 4.8 (H) 0.0 - 4.0 %    CK 64 39 - 308 U/L    Troponin-I, QT <0.02 0.0 - 0.045 NG/ML   NT-PRO BNP    Collection Time: 02/26/21  7:15 PM   Result Value Ref Range    NT pro-BNP 89 0 - 1,800 PG/ML   MAGNESIUM    Collection Time: 02/26/21  7:15 PM   Result Value Ref Range    Magnesium 2.1 1.6 - 2.6 mg/dL   CULTURE, BLOOD    Collection Time: 02/26/21  7:15 PM    Specimen: Blood   Result Value Ref Range    Special Requests: NO SPECIAL REQUESTS      Culture result: NO GROWTH AFTER 11 HOURS     POC G3    Collection Time: 02/26/21  8:51 PM   Result Value Ref Range    Device: VENT      FIO2 (POC) 1.0 %    pH (POC) 7.06 (LL) 7.35 - 7.45      pCO2 (POC) 99.8 (H) 35.0 - 45.0 MMHG    pO2 (POC) 82 80 - 100 MMHG    HCO3 (POC) 28.2 (H) 22 - 26 MMOL/L    sO2 (POC) 89 (L) 92 - 97 %    Base deficit (POC) 2 mmol/L    Mode ASSIST CONTROL      Tidal volume 450 ml    Set Rate 18 bpm    PEEP/CPAP (POC) 5 cmH2O    Allens test (POC) YES      Total resp. rate 18      Site RIGHT RADIAL      Patient temp.  98.6      Specimen type (POC) ARTERIAL      Performed by Sathya Hayes     Volume control YES     EKG, 12 LEAD, INITIAL Collection Time: 02/27/21 12:51 AM   Result Value Ref Range    Ventricular Rate 97 BPM    Atrial Rate 394 BPM    QRS Duration 84 ms    Q-T Interval 356 ms    QTC Calculation (Bezet) 452 ms    Calculated R Axis 49 degrees    Calculated T Axis 50 degrees    Diagnosis       Atrial fibrillation with premature ventricular or aberrantly conducted   complexes  Abnormal ECG  Confirmed by Patrick Vinson MD, [facility] ([id number]) on 2/27/2021 10:11:54 AM     POC G3    Collection Time: 02/27/21  1:11 AM   Result Value Ref Range    Device: VENT      FIO2 (POC) 0.9 %    pH (POC) 7.31 (L) 7.35 - 7.45      pCO2 (POC) 50.7 (H) 35.0 - 45.0 MMHG    pO2 (POC) 453 (H) 80 - 100 MMHG    HCO3 (POC) 25.2 22 - 26 MMOL/L    sO2 (POC) 100 (H) 92 - 97 %    Base deficit (POC) 1 mmol/L    Mode ASSIST CONTROL      Tidal volume 430 ml    Set Rate 24 bpm    PEEP/CPAP (POC) 5 cmH2O    PIP (POC) 21      Allens test (POC) N/A      Inspiratory Time 0.9 sec    Total resp.  rate 24      Site RIGHT RADIAL      Specimen type (POC) ARTERIAL      Performed by Ernesto Zavala     Volume control plus YES     HEMOGLOBIN A1C WITH EAG    Collection Time: 02/27/21  1:44 AM   Result Value Ref Range    Hemoglobin A1c 5.8 (H) 4.2 - 5.6 %    Est. average glucose 120 mg/dL   CARDIAC PANEL,(CK, CKMB & TROPONIN)    Collection Time: 02/27/21  1:44 AM   Result Value Ref Range    CK - MB 3.6 (H) <3.6 ng/ml    CK-MB Index 5.5 (H) 0.0 - 4.0 %    CK 66 39 - 308 U/L    Troponin-I, QT <0.02 0.0 - 0.045 NG/ML   GLUCOSE, POC    Collection Time: 02/27/21  1:49 AM   Result Value Ref Range    Glucose (POC) 152 (H) 70 - 110 mg/dL   GLUCOSE, POC    Collection Time: 02/27/21  6:00 AM   Result Value Ref Range    Glucose (POC) 92 70 - 110 mg/dL       Procedures/imaging: see electronic medical records for all procedures/Xrays and details which were not copied into this note but were reviewed prior to creation of Karen Cavazos MD   2/27/2021, 12:22 PM

## 2021-02-27 NOTE — PROGRESS NOTES
Called wife Mrs. TK Park.  Updated and answered all the questions to her satisfaction.     Summer Dodge MD

## 2021-02-27 NOTE — H&P
History & Physical    Patient: Donis Pantoja MRN: [id number]  CSN: [id number]    YOB: 1945  Age: 66 y.o. Sex: male      DOA: 2/26/2021    Chief Complaint:   Chief Complaint   Patient presents with    Shortness of Breath          HPI:     Donis Pantoja is a 66 y.o.  male who history of COPD on home oxygen, hypertension, brain aneurysm, pneumonia comes into the emergency room with acute onset of shortness of breath wife states that he has been in his usual stuff state of health and recently became acutely short of breath he had been using his home nebulizers without any difficulties he was recently discharged about 3 weeks ago for COPD flare  Past Medical History:   Diagnosis Date    Brain aneurysm     Cancer ([facility] [address]     prostate    COPD (chronic obstructive pulmonary disease) ([facility] [address]     Hypertension     Nocturia     Pneumonia     Radiation effect        Past Surgical History:   Procedure Laterality Date    HX HERNIA REPAIR         Family History   Problem Relation Age of Onset    Heart Disease Mother        Social History     Socioeconomic History    Marital status:      Spouse name: Not on file    Number of children: Not on file    Years of education: Not on file    Highest education level: Not on file   Tobacco Use    Smoking status: Former Smoker     Types: Cigarettes    Smokeless tobacco: Never Used   Substance and Sexual Activity    Alcohol use: No    Drug use: Never       Prior to Admission medications    Medication Sig Start Date End Date Taking? Authorizing Provider   predniSONE (STERAPRED) 5 mg dose pack See administration instruction per 5mg dose pack 2/7/21   Jone Jordan MD   budesonide (PULMICORT) 0.5 mg/2 mL nbsp 2 mL by Nebulization route two (2) times a day. 2/7/21   Jone Jordan MD   albuterol-ipratropium (DUO-NEB) 2.5 mg-0.5 mg/3 ml nebu 3 mL by Nebulization route every four (4) hours as needed for Wheezing.  2/7/21   Nikki Lizette Vazquez MD   albuterol (PROVENTIL HFA, VENTOLIN HFA, PROAIR HFA) 90 mcg/actuation inhaler Take 2 Puffs by inhalation every four (4) hours as needed for Wheezing or Shortness of Breath. 21   Lizette Jordan MD   arformoteroL (BROVANA) 15 mcg/2 mL nebu neb solution 2 mL by Nebulization route two (2) times a day. 21   Lizette Jordan MD   doxycycline (MONODOX) 100 mg capsule Take 1 Cap by mouth two (2) times a day. 21   Lizette Jordan MD   OXYGEN-AIR DELIVERY SYSTEMS 2 L/min by Nasal route continuous. Provider, Historical   furosemide (Lasix) 20 mg tablet Take 20 mg by mouth daily. Provider, Historical   dilTIAZem (CARDIZEM) 30 mg tablet Take 1 Tab by mouth Before breakfast, lunch, and dinner. Patient taking differently: Take 30 mg by mouth daily. Daily 20   Mel Enrique MD   acetaminophen (TYLENOL) 325 mg tablet Take 650 mg by mouth every eight (8) hours as needed for Pain. Provider, Historical   dextran 70/hypromellose (ARTIFICIAL TEARS, PF, OP) Apply 2 Drops to eye as needed for Other (both eyes for dryness). Provider, Historical   diphenhydrAMINE (BENADRYL) 25 mg capsule Take 25 mg by mouth nightly as needed for Itching. Provider, Historical   tamsulosin (FLOMAX) 0.4 mg capsule Take 0.4 mg by mouth every evening. Other, MD Blayne       Allergies   Allergen Reactions    Aspirin Other (comments)     \"messes my stomach up\"         Review of Systems  Limited due to intubation    Physical Exam:     Physical Exam:  Visit Vitals  BP (!) 183/69 (BP Patient Position: At rest)   Pulse (!) 112   Temp 99 °F (37.2 °C)   Resp 27   Ht 5' 8\" (1.727 m)   Wt 91.9 kg (202 lb 9.6 oz)   SpO2 92%   BMI 30.81 kg/m²      O2 Device: Non-rebreather mask    Temp (24hrs), Av °F (37.2 °C), Min:99 °F (37.2 °C), Max:99 °F (37.2 °C)    No intake/output data recorded. No intake/output data recorded.     General:  A intubated and sedated              Head: Normocephalic, without obvious abnormality, atraumatic. Eyes:  Conjunctivae/corneas clear. Nose: Nares normal. No drainage or sinus tenderness. Neck: Supple, symmetrical, trachea midline, no adenopathy, thyroid: no enlargement, no carotid bruit and no JVD. Epistaxis of the nose   Lungs:   Clear to auscultation bilaterally. Diminished throughout   Heart:   Irregularly irregular     Abdomen: Soft, non-tender. Bowel sounds normal.    Extremities: Extremities normal, atraumatic, no cyanosis or edema. Pulses: 2+ and symmetric all extremities. Skin:  No rashes or lesions   Neurologic:  Sedated on vent t.        Labs Reviewed:  Recent Results (from the past 24 hour(s))   COVID-19 RAPID TEST    Collection Time: 02/26/21  7:10 PM   Result Value Ref Range    Specimen source Nasopharyngeal      COVID-19 rapid test Not detected NOTD     EKG, 12 LEAD, INITIAL    Collection Time: 02/26/21  7:10 PM   Result Value Ref Range    Ventricular Rate 121 BPM    Atrial Rate 121 BPM    P-R Interval 166 ms    QRS Duration 78 ms    Q-T Interval 290 ms    QTC Calculation (Bezet) 411 ms    Calculated P Axis 71 degrees    Calculated R Axis 35 degrees    Calculated T Axis 77 degrees    Diagnosis       Poor data quality, interpretation may be adversely affected  Sinus tachycardia  Possible Left atrial enlargement  Cannot rule out Anterior infarct , age undetermined  Abnormal ECG  Confirmed by Ana Rangel MD, Shreya Gray ([id number]) on 2/26/2021 10:31:21 PM     POC LACTIC ACID    Collection Time: 02/26/21  7:12 PM   Result Value Ref Range    Lactic Acid (POC) 0.94 0.40 - 2.00 mmol/L   CBC WITH AUTOMATED DIFF    Collection Time: 02/26/21  7:15 PM   Result Value Ref Range    WBC 11.3 4.6 - 13.2 K/uL    RBC 4.11 (L) 4.70 - 5.50 M/uL    HGB 11.7 (L) 13.0 - 16.0 g/dL    HCT 36.4 36.0 - 48.0 %    MCV 88.6 74.0 - 97.0 FL    MCH 28.5 24.0 - 34.0 PG    MCHC 32.1 31.0 - 37.0 g/dL    RDW 14.0 11.6 - 14.5 %    PLATELET 377 105 - 908 K/uL    MPV 8.8 (L) 9.2 - 11.8 FL    NEUTROPHILS 53 40 - 73 % LYMPHOCYTES 30 21 - 52 %    MONOCYTES 9 3 - 10 %    EOSINOPHILS 7 (H) 0 - 5 %    BASOPHILS 1 0 - 2 %    ABS. NEUTROPHILS 6.1 1.8 - 8.0 K/UL    ABS. LYMPHOCYTES 3.4 0.9 - 3.6 K/UL    ABS. MONOCYTES 1.0 0.05 - 1.2 K/UL    ABS. EOSINOPHILS 0.8 (H) 0.0 - 0.4 K/UL    ABS. BASOPHILS 0.1 0.0 - 0.1 K/UL    DF AUTOMATED     METABOLIC PANEL, COMPREHENSIVE    Collection Time: 02/26/21  7:15 PM   Result Value Ref Range    Sodium 136 136 - 145 mmol/L    Potassium 4.2 3.5 - 5.5 mmol/L    Chloride 101 100 - 111 mmol/L    CO2 29 21 - 32 mmol/L    Anion gap 6 3.0 - 18 mmol/L    Glucose 177 (H) 74 - 99 mg/dL    BUN 23 (H) 7.0 - 18 MG/DL    Creatinine 1.49 (H) 0.6 - 1.3 MG/DL    BUN/Creatinine ratio 15 12 - 20      GFR est AA 55 (L) >60 ml/min/1.73m2    GFR est non-AA 46 (L) >60 ml/min/1.73m2    Calcium 8.9 8.5 - 10.1 MG/DL    Bilirubin, total 0.3 0.2 - 1.0 MG/DL    ALT (SGPT) 23 16 - 61 U/L    AST (SGOT) 13 10 - 38 U/L    Alk.  phosphatase 129 (H) 45 - 117 U/L    Protein, total 6.8 6.4 - 8.2 g/dL    Albumin 3.4 3.4 - 5.0 g/dL    Globulin 3.4 2.0 - 4.0 g/dL    A-G Ratio 1.0 0.8 - 1.7     CARDIAC PANEL,(CK, CKMB & TROPONIN)    Collection Time: 02/26/21  7:15 PM   Result Value Ref Range    CK - MB 3.1 <3.6 ng/ml    CK-MB Index 4.8 (H) 0.0 - 4.0 %    CK 64 39 - 308 U/L    Troponin-I, QT <0.02 0.0 - 0.045 NG/ML   NT-PRO BNP    Collection Time: 02/26/21  7:15 PM   Result Value Ref Range    NT pro-BNP 89 0 - 1,800 PG/ML   MAGNESIUM    Collection Time: 02/26/21  7:15 PM   Result Value Ref Range    Magnesium 2.1 1.6 - 2.6 mg/dL   POC G3    Collection Time: 02/26/21  8:51 PM   Result Value Ref Range    Device: VENT      FIO2 (POC) 1.0 %    pH (POC) 7.06 (LL) 7.35 - 7.45      pCO2 (POC) 99.8 (H) 35.0 - 45.0 MMHG    pO2 (POC) 82 80 - 100 MMHG    HCO3 (POC) 28.2 (H) 22 - 26 MMOL/L    sO2 (POC) 89 (L) 92 - 97 %    Base deficit (POC) 2 mmol/L    Mode ASSIST CONTROL      Tidal volume 450 ml    Set Rate 18 bpm    PEEP/CPAP (POC) 5 cmH2O    Myrna test (POC) YES      Total resp. rate 18      Site RIGHT RADIAL      Patient temp. 98.6      Specimen type (POC) ARTERIAL      Performed by [employer]     Volume control YES     EKG, 12 LEAD, INITIAL    Collection Time: 02/27/21 12:51 AM   Result Value Ref Range    Ventricular Rate 97 BPM    Atrial Rate 394 BPM    QRS Duration 84 ms    Q-T Interval 356 ms    QTC Calculation (Bezet) 452 ms    Calculated R Axis 49 degrees    Calculated T Axis 50 degrees    Diagnosis       Atrial fibrillation with premature ventricular or aberrantly conducted   complexes  Abnormal ECG  When compared with ECG of 26-FEB-2021 19:10,  Atrial fibrillation has replaced Sinus rhythm  ST no longer depressed in Anterior leads       All lab results for the last 24 hours reviewed.   CXR,  and EKG    Procedures/imaging: see electronic medical records for all procedures/Xrays and details which were not copied into this note but were reviewed prior to creation of Plan      Assessment/Plan     Active Problems:  Cardiovascular:   Atrial fib we will check cardiac enzymes hypotensive on mel-    Pulmonary:   Respiratory failure hypoxic requiring urgent intubation with COPD exacerbation starting Solu-Medrol empiric Rocephin and Zithromax check urine Legionella rapid Covid and PCR Covid no exposure x-ray not consistent vitamin cocktail not started      GI Protonix for protection      Fen: Placed on electrolyte protocol    Renal   started bicarb drip  Neuro   sedation with fentanyl and propofol  Hematology     DVT prevention monitor CBC monitor for bleeding has mild epistasis will use  DVT/GI Prophylaxis: hep    Discussed with wife   Glenn guarded   Kat Prieto MD  2/26/2021 8:12 PM

## 2021-02-27 NOTE — PROGRESS NOTES
2309: received report from Geoff Lind RN. Freddy at bedside to assist transfer to ICU.    2323: Pt in ICU. CHG bathed on arrival. Skin intact. Redness noted to bilateral feet and ankles, blanchable. Sacrum pink and blanchable. Assessment complete on arrival, See flow sheets. 0400: Reassessment, no changes. 0600: Urine specimen sent to lab.    0430: Bedside and Verbal shift change report given to MEGHNA Metcalf RN (oncoming nurse) by Mane Jose RN   (offgoing nurse). Report included the following information SBAR, Kardex, Intake/Output, MAR, Recent Results, Cardiac Rhythm  , Alarm Parameters  and Quality Measures.

## 2021-02-27 NOTE — PROGRESS NOTES
0720-received report from Nakul Ambrose 90 included SBAR MAR and Kardex. Shift Summary-maintained on vent. Propofol @40. Fentanyl @ 50. Levo @ 1. Bedside and Verbal shift change report given to Bruce Medrano RN (oncoming nurse) by Karen Coley RN (offgoing nurse). Report included the following information SBAR, Kardex and MAR.

## 2021-02-28 ENCOUNTER — APPOINTMENT (OUTPATIENT)
Dept: GENERAL RADIOLOGY | Age: 76
DRG: 208 | End: 2021-02-28
Attending: INTERNAL MEDICINE
Payer: MEDICARE

## 2021-02-28 LAB
ALBUMIN SERPL-MCNC: 2.7 G/DL (ref 3.4–5)
ALBUMIN/GLOB SERPL: 0.8 {RATIO} (ref 0.8–1.7)
ALP SERPL-CCNC: 112 U/L (ref 45–117)
ALT SERPL-CCNC: 17 U/L (ref 16–61)
ANION GAP SERPL CALC-SCNC: 12 MMOL/L (ref 3–18)
ARTERIAL PATENCY WRIST A: ABNORMAL
ARTERIAL PATENCY WRIST A: YES
AST SERPL-CCNC: 9 U/L (ref 10–38)
BASE DEFICIT BLD-SCNC: 3 MMOL/L
BASE EXCESS BLD CALC-SCNC: 0 MMOL/L
BASOPHILS # BLD: 0 K/UL (ref 0–0.1)
BASOPHILS NFR BLD: 0 % (ref 0–2)
BDY SITE: ABNORMAL
BDY SITE: ABNORMAL
BILIRUB SERPL-MCNC: 0.2 MG/DL (ref 0.2–1)
BUN SERPL-MCNC: 24 MG/DL (ref 7–18)
BUN/CREAT SERPL: 17 (ref 12–20)
CALCIUM SERPL-MCNC: 8.5 MG/DL (ref 8.5–10.1)
CHLORIDE SERPL-SCNC: 102 MMOL/L (ref 100–111)
CO2 SERPL-SCNC: 23 MMOL/L (ref 21–32)
CREAT SERPL-MCNC: 1.42 MG/DL (ref 0.6–1.3)
DIFFERENTIAL METHOD BLD: ABNORMAL
EOSINOPHIL # BLD: 0 K/UL (ref 0–0.4)
EOSINOPHIL NFR BLD: 0 % (ref 0–5)
ERYTHROCYTE [DISTWIDTH] IN BLOOD BY AUTOMATED COUNT: 13.7 % (ref 11.6–14.5)
GAS FLOW.O2 O2 DELIVERY SYS: ABNORMAL L/MIN
GAS FLOW.O2 O2 DELIVERY SYS: ABNORMAL L/MIN
GAS FLOW.O2 SETTING OXYMISER: 18 BPM
GLOBULIN SER CALC-MCNC: 3.4 G/DL (ref 2–4)
GLUCOSE BLD STRIP.AUTO-MCNC: 130 MG/DL (ref 70–110)
GLUCOSE BLD STRIP.AUTO-MCNC: 132 MG/DL (ref 70–110)
GLUCOSE BLD STRIP.AUTO-MCNC: 138 MG/DL (ref 70–110)
GLUCOSE BLD STRIP.AUTO-MCNC: 147 MG/DL (ref 70–110)
GLUCOSE SERPL-MCNC: 139 MG/DL (ref 74–99)
HCO3 BLD-SCNC: 23.1 MMOL/L (ref 22–26)
HCO3 BLD-SCNC: 25.2 MMOL/L (ref 22–26)
HCT VFR BLD AUTO: 33.6 % (ref 36–48)
HGB BLD-MCNC: 10.7 G/DL (ref 13–16)
INSPIRATION.DURATION SETTING TIME VENT: 0.9 SEC
LYMPHOCYTES # BLD: 0.6 K/UL (ref 0.9–3.6)
LYMPHOCYTES NFR BLD: 11 % (ref 21–52)
MAGNESIUM SERPL-MCNC: 2.2 MG/DL (ref 1.6–2.6)
MCH RBC QN AUTO: 27.6 PG (ref 24–34)
MCHC RBC AUTO-ENTMCNC: 31.8 G/DL (ref 31–37)
MCV RBC AUTO: 86.6 FL (ref 74–97)
MONOCYTES # BLD: 0.1 K/UL (ref 0.05–1.2)
MONOCYTES NFR BLD: 2 % (ref 3–10)
NEUTS SEG # BLD: 4.9 K/UL (ref 1.8–8)
NEUTS SEG NFR BLD: 87 % (ref 40–73)
O2/TOTAL GAS SETTING VFR VENT: 0.3 %
O2/TOTAL GAS SETTING VFR VENT: 30 %
PCO2 BLD: 42.1 MMHG (ref 35–45)
PCO2 BLD: 43.5 MMHG (ref 35–45)
PEEP RESPIRATORY: 5 CMH2O
PEEP RESPIRATORY: 5 CMH2O
PH BLD: 7.35 [PH] (ref 7.35–7.45)
PH BLD: 7.37 [PH] (ref 7.35–7.45)
PHOSPHATE SERPL-MCNC: 4.7 MG/DL (ref 2.5–4.9)
PIP ISTAT,IPIP: 21
PLATELET # BLD AUTO: 296 K/UL (ref 135–420)
PMV BLD AUTO: 8.6 FL (ref 9.2–11.8)
PO2 BLD: 103 MMHG (ref 80–100)
PO2 BLD: 72 MMHG (ref 80–100)
POTASSIUM SERPL-SCNC: 4.2 MMOL/L (ref 3.5–5.5)
PRESSURE SUPPORT SETTING VENT: 7 CMH2O
PROT SERPL-MCNC: 6.1 G/DL (ref 6.4–8.2)
RBC # BLD AUTO: 3.88 M/UL (ref 4.7–5.5)
SAO2 % BLD: 93 % (ref 92–97)
SAO2 % BLD: 98 % (ref 92–97)
SERVICE CMNT-IMP: ABNORMAL
SERVICE CMNT-IMP: ABNORMAL
SODIUM SERPL-SCNC: 137 MMOL/L (ref 136–145)
SPECIMEN TYPE: ABNORMAL
SPECIMEN TYPE: ABNORMAL
TOTAL RESP. RATE, ITRR: 11
TOTAL RESP. RATE, ITRR: 18
VENTILATION MODE VENT: ABNORMAL
VENTILATION MODE VENT: ABNORMAL
VOLUME CONTROL PLUS IVLCP: YES
VT SETTING VENT: 430 ML
WBC # BLD AUTO: 5.7 K/UL (ref 4.6–13.2)

## 2021-02-28 PROCEDURE — 94003 VENT MGMT INPAT SUBQ DAY: CPT

## 2021-02-28 PROCEDURE — 65610000006 HC RM INTENSIVE CARE

## 2021-02-28 PROCEDURE — C9113 INJ PANTOPRAZOLE SODIUM, VIA: HCPCS | Performed by: INTERNAL MEDICINE

## 2021-02-28 PROCEDURE — 77010033678 HC OXYGEN DAILY

## 2021-02-28 PROCEDURE — 74011250636 HC RX REV CODE- 250/636: Performed by: INTERNAL MEDICINE

## 2021-02-28 PROCEDURE — 83735 ASSAY OF MAGNESIUM: CPT

## 2021-02-28 PROCEDURE — 84100 ASSAY OF PHOSPHORUS: CPT

## 2021-02-28 PROCEDURE — 85025 COMPLETE CBC W/AUTO DIFF WBC: CPT

## 2021-02-28 PROCEDURE — 74011000250 HC RX REV CODE- 250: Performed by: INTERNAL MEDICINE

## 2021-02-28 PROCEDURE — 36600 WITHDRAWAL OF ARTERIAL BLOOD: CPT

## 2021-02-28 PROCEDURE — 82962 GLUCOSE BLOOD TEST: CPT

## 2021-02-28 PROCEDURE — 94640 AIRWAY INHALATION TREATMENT: CPT

## 2021-02-28 PROCEDURE — 77030013140 HC MSK NEB VYRM -A

## 2021-02-28 PROCEDURE — 82803 BLOOD GASES ANY COMBINATION: CPT

## 2021-02-28 PROCEDURE — 80053 COMPREHEN METABOLIC PANEL: CPT

## 2021-02-28 PROCEDURE — 71045 X-RAY EXAM CHEST 1 VIEW: CPT

## 2021-02-28 RX ORDER — MIDAZOLAM HYDROCHLORIDE 1 MG/ML
1 INJECTION INTRAMUSCULAR; INTRAVENOUS
Status: DISCONTINUED | OUTPATIENT
Start: 2021-02-28 | End: 2021-02-28

## 2021-02-28 RX ORDER — IPRATROPIUM BROMIDE AND ALBUTEROL SULFATE 2.5; .5 MG/3ML; MG/3ML
3 SOLUTION RESPIRATORY (INHALATION)
Status: DISCONTINUED | OUTPATIENT
Start: 2021-02-28 | End: 2021-03-03 | Stop reason: HOSPADM

## 2021-02-28 RX ORDER — MIDAZOLAM HYDROCHLORIDE 1 MG/ML
INJECTION INTRAMUSCULAR; INTRAVENOUS
Status: DISCONTINUED
Start: 2021-02-28 | End: 2021-02-28

## 2021-02-28 RX ADMIN — PANTOPRAZOLE SODIUM 40 MG: 40 INJECTION, POWDER, FOR SOLUTION INTRAVENOUS at 08:28

## 2021-02-28 RX ADMIN — AZITHROMYCIN MONOHYDRATE 500 MG: 500 INJECTION, POWDER, LYOPHILIZED, FOR SOLUTION INTRAVENOUS at 21:58

## 2021-02-28 RX ADMIN — Medication 10 ML: at 05:29

## 2021-02-28 RX ADMIN — 0.12% CHLORHEXIDINE GLUCONATE 10 ML: 1.2 RINSE ORAL at 08:25

## 2021-02-28 RX ADMIN — Medication 10 ML: at 14:52

## 2021-02-28 RX ADMIN — PROPOFOL 50 MCG/KG/MIN: 10 INJECTION, EMULSION INTRAVENOUS at 07:23

## 2021-02-28 RX ADMIN — HEPARIN SODIUM 5000 UNITS: 5000 INJECTION INTRAVENOUS; SUBCUTANEOUS at 01:24

## 2021-02-28 RX ADMIN — PROPOFOL 35 MCG/KG/MIN: 10 INJECTION, EMULSION INTRAVENOUS at 03:40

## 2021-02-28 RX ADMIN — METHYLPREDNISOLONE SODIUM SUCCINATE 60 MG: 40 INJECTION, POWDER, FOR SOLUTION INTRAMUSCULAR; INTRAVENOUS at 18:08

## 2021-02-28 RX ADMIN — METHYLPREDNISOLONE SODIUM SUCCINATE 60 MG: 40 INJECTION, POWDER, FOR SOLUTION INTRAMUSCULAR; INTRAVENOUS at 07:30

## 2021-02-28 RX ADMIN — METHYLPREDNISOLONE SODIUM SUCCINATE 60 MG: 40 INJECTION, POWDER, FOR SOLUTION INTRAMUSCULAR; INTRAVENOUS at 00:17

## 2021-02-28 RX ADMIN — CEFTRIAXONE 1 G: 1 INJECTION, POWDER, FOR SOLUTION INTRAMUSCULAR; INTRAVENOUS at 21:58

## 2021-02-28 RX ADMIN — FENTANYL CITRATE 50 MCG/HR: 0.05 INJECTION, SOLUTION INTRAMUSCULAR; INTRAVENOUS at 01:18

## 2021-02-28 RX ADMIN — MIDAZOLAM HYDROCHLORIDE 1 MG: 1 INJECTION, SOLUTION INTRAMUSCULAR; INTRAVENOUS at 05:29

## 2021-02-28 RX ADMIN — IPRATROPIUM BROMIDE AND ALBUTEROL SULFATE 3 ML: .5; 3 SOLUTION RESPIRATORY (INHALATION) at 20:35

## 2021-02-28 RX ADMIN — METHYLPREDNISOLONE SODIUM SUCCINATE 60 MG: 40 INJECTION, POWDER, FOR SOLUTION INTRAMUSCULAR; INTRAVENOUS at 14:50

## 2021-02-28 RX ADMIN — HEPARIN SODIUM 5000 UNITS: 5000 INJECTION INTRAVENOUS; SUBCUTANEOUS at 10:45

## 2021-02-28 RX ADMIN — HEPARIN SODIUM 5000 UNITS: 5000 INJECTION INTRAVENOUS; SUBCUTANEOUS at 18:08

## 2021-02-28 RX ADMIN — Medication 10 ML: at 21:59

## 2021-02-28 NOTE — PROGRESS NOTES
0710 Bedside shift change report received from Dang Bran RN. Report included the following information SBAR, Kardex, MAR, Recent Results and Cardiac Rhythm SR.     0746 Sedation Vacation Started    0800 SBT started. 0910 Extubated to NC on 2L.    1800 Attempted bedside swallow, patient stated water was painful to swallow, tolerated ice chips.

## 2021-02-28 NOTE — PROGRESS NOTES
1930-Bedside verbal report received from MEGHNA Sainz RN. Sbar, mar, labs, kardex and patient status reviewed. Assumed care of patient. 2000-BP above map goal. Levophed drip turned off at this time. 2030-Assessment completed. No changes from off-going report. 0000-Assessment completed. No changes. 0415-Assessment completed. Pt is restless with care and difficult to settle. Propofol increased per RASS goal.    0500-Pt increasingly more agitated at this time. Flailing head back and forth and kicked feet OOB. Increased propofol per orders. While in room to reposition patient, orders received for PRN Versed pushes for agitation. 0529-1Mg Versed IVP given per orders. Patient appears comfortable at this time. Resting quietly. 0710-Bedside verbal report given to PAUL Castro RN. Anamar, mar, labs, kardex and patient status reviewed. Relinquished care of patient.

## 2021-02-28 NOTE — PROGRESS NOTES
02/28/21 0800   Weaning Parameters   Resp Rate Observed 23   Ve 19.8      RSBI 40   Placed on SBT of Pressure support 7/PEEP 5 FIO2 30%.  RN aware. Will monitor.

## 2021-02-28 NOTE — PROGRESS NOTES
02/28/21 0910   Weaning Parameters   Spontaneous Breathing Trial Complete Yes   Resp Rate Observed 19   Ve 11.8      RSBI 38   ABG's on SBT x 1 hr. ( PS 7/PEEP 5/ FIO2 30%) Ph 7.37 PCo2 44 PO2 103 HCO3 25 SO2 98 DR. Daisy Watson aware. Patient extubated and placed on 2 liter nasal cannula.  RR 22, lungs clear. RN at bedside.

## 2021-02-28 NOTE — PROGRESS NOTES
[facility]ists  Pulmonary, Critical Care, and Sleep Medicine    Name: Sharyle Furl MRN: [id number]   : 1945 Hospital: [facility] [place]   Date: 2021        Pulmonary Critical Care Note    IMPRESSION:   · Acute on chronic hypercapnic hypoxic respiratory failure · J96.22 ·   COPD exacerbation · J44.1 ·   Hypotension · I95.9 ·   URIEL · N17.9     Patient Active Problem List   Diagnosis Code    Hypertension I10    COPD (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 ([facility]) J44.9    Chronic renal disease, stage 3, moderately decreased glomerular filtration rate between 30-59 mL/min/1.73 square meter N18.30    Asbestosis ([address] J61    Acute exacerbation of chronic obstructive pulmonary disease (COPD) ([address] J44.1    Debility R53.81    Paroxysmal atrial fibrillation ([facility]) I48.0    Chronic respiratory failure with hypoxia ([facility]) J96.11    Tobacco dependence F17.200    Hyponatremia E87.1    Pleural effusion, right J90    COPD with acute exacerbation ([facility]) J44.1    Acute respiratory failure with hypoxia and hypercarbia ([facility]) J96.01, J96.02    Hyponatremia E87.1    Advanced care planning/counseling discussion Z71.89    Hypokalemia E87.6    COPD (chronic obstructive pulmonary disease) ([facility]) J44.9    CAP (community acquired pneumonia) J18.9    Respiratory distress R06.03    COPD exacerbation ([facility]) J44.1    Atrial fibrillation with RVR ([facility]) I48.91    Acute respiratory failure ([facility]) J96.00      RECOMMENDATIONS:   Respiratory: Mech. Ventilated patients- aim to keep peak plateau pressure less than or equal to 30cm H2O.  Titrate FiO2 for goal SPO2> 91%  AM ABG and CXR reviewed - stable overall  VAP prevention bundle and sedation bundle followed, head of the bed at 30' all times  On sedation holiday - awake, following all commands and assessment for weaning with SBT tolerated with RSBi < 50; good cough and gag reflex, minimal ET secretions - will f/up ABG and liberate from ventilator as tolerated  Sputum culture per ET tube if sample available  Continue bronchodilators, pulmonary hygiene care  Steroids - taper with clinical stability    ID: no evidence of pneumonia  Rapid test negative for SARS CoV2 - low clinical suspicion for COVID19  Antibiotic choice: ceftriaxone, azithromycin  Blood Cultures will be followed. Deescalate antibiotic when appropriate. Lactic acid - normal    CVS: improved hemodynamics  Actively titrated off of vasopressors aim SBP > 100 mmHg  ECHO 2/27/21  · Image quality for this study was suboptimal.  · Contrast used: DEFINITY. · Left Ventricle: Normal cavity size, wall thickness and systolic function (ejection fraction normal). The estimated EF is 50 - 55%. There is mild (grade 1) left ventricular diastolic dysfunction E'E= 10.05. Poor resolution of endocardial border. Can not comment on wall motion abnormality  · Tricuspid Valve: Tricuspid valve not well visualized. No stenosis. Tricuspid regurgitation is inadequate for estimation of right ventricular systolic pressure. Renal: stable renal functions, UO  Replace lytes per unit protocol, recheck as needed  CK normal    Heme: stable Hgb, plt  No evidence of active bleeding  Endo: Glycemic control as needed with steroid  GI: diet per nursing protocol for swallowing post-ventilator liberation   Neurology: on sedation holiday from Propofol and Fentanyl  AM labs    Will defer respective systems problem management to primary and other consultant and follow patient in ICU with primary and other medical team  Further recommendations will be based on the patient's response to recommended treatment and results of the investigation ordered. Quality Care: PPI, DVT prophylaxis, HOB elevated, Infection control all reviewed and addressed. PAIN AND SEDATION: Fentanyl, Propofol  Skin/Wound: none  Prophylaxis: DVT and GI Prophylaxis reviewed.    Restraints: Wrist soft restraints for patient interfering with medical therapy/management and patient safety. PT/OT eval and treat: as needed when stable   Lines/Tubes: Central Line, Humphreys and Vent Bundles will be followed, Vent Day 2   ETT: 2/26/21. OGT/NGT: 2/26/21. Central line: 2/26/21 (site examined, no erythema, induration, discharge or sign of infection. Dressing intact. Medically necessary, will remove it when not needed. Central line bundle followed). Humphreys: 2/26/21 (Medically necessary for strict input/output monitoring in critically ill patient, will remove it when not needed. Humphreys bundle followed). ADVANCE DIRECTIVE: full code. Palliative care consult per clinical course  DISCUSSION: Events and notes from last 24 hours reviewed. Care plan discussed with nursing, hospitalist, RT    Quality Care: PPI, DVT prophylaxis, HOB elevated, Infection control all reviewed and addressed. High complexity decision making was performed during the evaluation of this patient at high risk for decompensation with multiple organ involvement. Total critical care time spent rendering complex decision making and > 50% time spent in face to face evaluation exclusive of procedures/family discussion/coordination of care: 45 minutes. Subjective/History:   Mr. Sharyle Furl has been seen and evaluated as Dr. China Sullivan requested for assisting with Acute on chronic hypercapnic hypoxic respiratory failure, COPD exacerbation. The patient can not provide additional history due to Ventilated and sedated. Patient is a 66 y.o. male with following PMHx presented via EMS with progressive worsening in his SOB, tripoding and failed to respond to NRB, HFNC and remained obtunded requiring intubation and ventilator support. He was recently discharged from [facility] 3 weeks ago after COPD exacerbation related admission. He followed with Dr. Keily Gandhi in our office. Poor adherence to treatments. In ER, SARS CoV2 rapid negative; CXR no pulmonary infiltrates.  After intubation required multiple sedatives for ventilator synchrony. Developed hypotension requiring vasopressor support. CVC placed by ER provider. No reported fever, chills, sore throat per staff. Other information limited. Pt seen in ICU at bedside rm 105 in droplet isolation. 02/28/21  Pt in ICU and seen at bedside in rm 105  Off of droplet isolation as other explanation for respiratory failure, stable CXR and negative rapid test  Off sedation; awake, following all simple commands, tolerating bedside SBT  Minimal ET secretions, good cough and gag reflex  Off of vasopressor titrated y'day; BP stable  Afebrile. UO fair  I was not contacted by staff on anything about patient overnight. Review of Systems:  Review of systems not obtained due to patient factors. Latest lactic acid:   Lactic acid   Date Value Ref Range Status   08/04/2020 1.3 0.4 - 2.0 MMOL/L Final   06/13/2020 1.2 0.4 - 2.0 MMOL/L Final   04/09/2020 1.1 0.4 - 2.0 MMOL/L Final       Past Medical History:  Past Medical History:   Diagnosis Date    Brain aneurysm     Cancer ([address]     prostate    COPD (chronic obstructive pulmonary disease) ([address]     Hypertension     Nocturia     Pneumonia     Radiation effect         Past Surgical History:  Past Surgical History:   Procedure Laterality Date    HX HERNIA REPAIR          Medications:  Prior to Admission medications    Medication Sig Start Date End Date Taking? Authorizing Provider   predniSONE (STERAPRED) 5 mg dose pack See administration instruction per 5mg dose pack 2/7/21   Sheree Jordan MD   budesonide (PULMICORT) 0.5 mg/2 mL nbsp 2 mL by Nebulization route two (2) times a day. 2/7/21   Sheree Jordan MD   albuterol-ipratropium (DUO-NEB) 2.5 mg-0.5 mg/3 ml nebu 3 mL by Nebulization route every four (4) hours as needed for Wheezing.  2/7/21   Sheree Jordan MD   albuterol (PROVENTIL HFA, VENTOLIN HFA, PROAIR HFA) 90 mcg/actuation inhaler Take 2 Puffs by inhalation every four (4) hours as needed for Wheezing or Shortness of Breath. 2/7/21   Jonathan Jordan MD   arformoteroL (BROVANA) 15 mcg/2 mL nebu neb solution 2 mL by Nebulization route two (2) times a day. 2/7/21   Jonathan Jordan MD   doxycycline (MONODOX) 100 mg capsule Take 1 Cap by mouth two (2) times a day. 2/7/21   Jonathan Jordan MD   OXYGEN-AIR DELIVERY SYSTEMS 2 L/min by Nasal route continuous. Provider, Historical   furosemide (Lasix) 20 mg tablet Take 20 mg by mouth daily. Provider, Historical   dilTIAZem (CARDIZEM) 30 mg tablet Take 1 Tab by mouth Before breakfast, lunch, and dinner. Patient taking differently: Take 30 mg by mouth daily. Daily 4/14/20   Umer Childress MD   acetaminophen (TYLENOL) 325 mg tablet Take 650 mg by mouth every eight (8) hours as needed for Pain. Provider, Historical   dextran 70/hypromellose (ARTIFICIAL TEARS, PF, OP) Apply 2 Drops to eye as needed for Other (both eyes for dryness). Provider, Historical   diphenhydrAMINE (BENADRYL) 25 mg capsule Take 25 mg by mouth nightly as needed for Itching. Provider, Historical   tamsulosin (FLOMAX) 0.4 mg capsule Take 0.4 mg by mouth every evening.     Other, MD Blayne       Current Facility-Administered Medications   Medication Dose Route Frequency    azithromycin (ZITHROMAX) 500 mg in 0.9% sodium chloride 250 mL (VIAL-MATE)  500 mg IntraVENous Q24H    methylPREDNISolone (PF) (SOLU-MEDROL) injection 60 mg  60 mg IntraVENous Q6H    sodium chloride (NS) flush 5-40 mL  5-40 mL IntraVENous Q8H    heparin (porcine) injection 5,000 Units  5,000 Units SubCUTAneous Q8H    chlorhexidine (PERIDEX) 0.12 % mouthwash 10 mL  10 mL Oral Q12H    insulin lispro (HUMALOG) injection   SubCUTAneous Q6H    propofol (DIPRIVAN) 10 mg/mL infusion  0-50 mcg/kg/min IntraVENous TITRATE    NOREPINephrine (LEVOPHED) 8 mg in 0.9% NS 250ml infusion  0.5-16 mcg/min IntraVENous TITRATE    fentaNYL (PF) 900 mcg/30 ml infusion soln  0-200 mcg/hr IntraVENous TITRATE    cefTRIAXone (ROCEPHIN) 1 g in sterile water (preservative free) 10 mL IV syringe  1 g IntraVENous Q24H    pantoprazole (PROTONIX) 40 mg in 0.9% sodium chloride 10 mL injection  40 mg IntraVENous DAILY       Allergy:  Allergies   Allergen Reactions    Aspirin Other (comments)     \"messes my stomach up\"        Social History:  Social History     Tobacco Use    Smoking status: Former Smoker     Types: Cigarettes    Smokeless tobacco: Never Used   Substance Use Topics    Alcohol use: No    Drug use: Never        Family History:  Family History   Problem Relation Age of Onset    Heart Disease Mother           Objective:   Vital Signs:    Blood pressure (!) 177/96, pulse (!) 105, temperature 98.5 °F (36.9 °C), resp. rate 20, height 5' 8\" (1.727 m), weight 91.6 kg (202 lb), SpO2 98 %. Body mass index is 30.71 kg/m². O2 Device: Endotracheal tube   O2 Flow Rate (L/min): 60 l/min   Temp (24hrs), Av.4 °F (36.9 °C), Min:98.3 °F (36.8 °C), Max:98.5 °F (36.9 °C)         Intake/Output:   Last shift:      No intake/output data recorded. Last 3 shifts:  1901 -  0700  In: 2792.8 [I.V.:2792.8]  Out: 2759 [Urine:1350]    Intake/Output Summary (Last 24 hours) at 2021 0915  Last data filed at 2021 0636  Gross per 24 hour   Intake 908.53 ml   Output 1350 ml   Net -441.47 ml       Ventilator Settings:  Mode Rate Tidal Volume Pressure FiO2 PEEP   Spontaneous   430 ml  7 cm H2O 30 % 5 cm H20     Peak airway pressure: 21 cm H2O    Minute ventilation: 11.1 l/min      Lung protective strategy, Pl pressure goals less than or equal to 30.     Physical Exam:  General: awake, follows commands, in no respiratory distress, appears older than stated age, on ventilator  HEENT: PERRL, fundi benign, orally intubated  Neck: No abnormally enlarged lymph nodes or thyroid, supple  Chest: increased AP diameter  Lungs: moderate air entry, no end expiratory wheeze, few rhonchi scattered bilaterally, normal percussion anterior chest wall bilaterally, no tenderness/ rash  Heart: Regular rate and rhythm, S1S2 present or without murmur or extra heart sounds  Abdomen: non distended, bowel sounds normoactive, tympanic, soft without significant tenderness, masses, organomegaly or guarding, rigidity, rebound  Extremity: no edema, cyanosis, clubbing  Capillary refill: normal  Neuro: awake, following simple commands, no involuntary movements, exam limitation on ventilator and sedation  Skin: Skin color, texture, turgor fair.  Skin dry, warm, non-diaphoretic    Data:     Recent Results (from the past 24 hour(s))   GLUCOSE, POC    Collection Time: 02/27/21  1:19 PM   Result Value Ref Range    Glucose (POC) 133 (H) 70 - 110 mg/dL   ECHO ADULT COMPLETE    Collection Time: 02/27/21  3:11 PM   Result Value Ref Range    IVSd 1.10 (A) 0.6 - 1.0 cm    LVIDd 4.87 4.2 - 5.9 cm    LVIDs 3.83 cm    LVOT d 2.26 cm    LVPWd 1.14 (A) 0.6 - 1.0 cm    BP EF 48.8 (A) 55 - 100 %    LV Ejection Fraction MOD 2C 46 %    LV Ejection Fraction MOD 4C 47 %    LV ED Vol A2C 50.14 ml    LV ED Vol A4C 87.65 ml    LV ED Vol BP 74.70 67 - 155 ml    LV ES Vol A2C 27.30 ml    LV ES Vol A4C 46.41 ml    LV ES Vol BP 38.23 22 - 58 ml    LVOT SV 88.5 ml    LVOT Cardiac Output 14.49 l/min    LVOT Peak Gradient 2.49 mmHg    Left Ventricular Outflow Tract Mean Gradient 1.59 mmHg    LVOT Peak Velocity 79.00 cm/s    LVOT VTI 22.05 cm    RVIDd 2.81 cm    LA Volume 48.55 18 - 58 ml    LA Vol 2C 25.34 18 - 58 ml    LA Vol 4C 58.73 (A) 18 - 58 ml    Right Atrial Area 4C 15.29 cm2    AV Annulus 3.27 cm    Aortic Valve Area by Continuity of Peak Velocity 2.60 cm2    Aortic Valve Area by Continuity of VTI 3.30 cm2    AoV PG 5.91 mmHg    Aortic Valve Systolic Mean Gradient 2.30 mmHg    Aortic Valve Systolic Peak Velocity 184.80 cm/s    AoV VTI 26.79 cm    MV A Raul 107.00 cm/s    Mitral Valve E Wave Deceleration Time 118.48 ms    MV E Raul 86.00 cm/s    Mitral Valve Pressure Half-time 34.36 ms    MVA (PHT) 6.40 cm2    LV E' Septal Velocity 7.00 cm/s    LV E' Lateral Velocity 11.00 cm/s    MV E/A 0.80     LV Mass .5 88 - 224 g    LV Mass AL Index 99.2 49 - 115 g/m2    E/E' lateral 7.82     E/E' septal 12.29     IVC proximal 1.40 cm    E/E' ratio (averaged) 10.05     GRANT/BSA Pk Raul 1.3 cm2/m2    GRANT/BSA VTI 1.6 cm2/m2   GLUCOSE, POC    Collection Time: 02/27/21  6:20 PM   Result Value Ref Range    Glucose (POC) 149 (H) 70 - 110 mg/dL   GLUCOSE, POC    Collection Time: 02/28/21 12:05 AM   Result Value Ref Range    Glucose (POC) 147 (H) 70 - 110 mg/dL   CBC WITH AUTOMATED DIFF    Collection Time: 02/28/21  4:45 AM   Result Value Ref Range    WBC 5.7 4.6 - 13.2 K/uL    RBC 3.88 (L) 4.70 - 5.50 M/uL    HGB 10.7 (L) 13.0 - 16.0 g/dL    HCT 33.6 (L) 36.0 - 48.0 %    MCV 86.6 74.0 - 97.0 FL    MCH 27.6 24.0 - 34.0 PG    MCHC 31.8 31.0 - 37.0 g/dL    RDW 13.7 11.6 - 14.5 %    PLATELET 006 090 - 836 K/uL    MPV 8.6 (L) 9.2 - 11.8 FL    NEUTROPHILS 87 (H) 40 - 73 %    LYMPHOCYTES 11 (L) 21 - 52 %    MONOCYTES 2 (L) 3 - 10 %    EOSINOPHILS 0 0 - 5 %    BASOPHILS 0 0 - 2 %    ABS. NEUTROPHILS 4.9 1.8 - 8.0 K/UL    ABS. LYMPHOCYTES 0.6 (L) 0.9 - 3.6 K/UL    ABS. MONOCYTES 0.1 0.05 - 1.2 K/UL    ABS. EOSINOPHILS 0.0 0.0 - 0.4 K/UL    ABS.  BASOPHILS 0.0 0.0 - 0.1 K/UL    DF AUTOMATED     MAGNESIUM    Collection Time: 02/28/21  4:45 AM   Result Value Ref Range    Magnesium 2.2 1.6 - 2.6 mg/dL   PHOSPHORUS    Collection Time: 02/28/21  4:45 AM   Result Value Ref Range    Phosphorus 4.7 2.5 - 4.9 MG/DL   METABOLIC PANEL, COMPREHENSIVE    Collection Time: 02/28/21  4:45 AM   Result Value Ref Range    Sodium 137 136 - 145 mmol/L    Potassium 4.2 3.5 - 5.5 mmol/L    Chloride 102 100 - 111 mmol/L    CO2 23 21 - 32 mmol/L    Anion gap 12 3.0 - 18 mmol/L    Glucose 139 (H) 74 - 99 mg/dL    BUN 24 (H) 7.0 - 18 MG/DL    Creatinine 1.42 (H) 0.6 - 1.3 MG/DL    BUN/Creatinine ratio 17 12 - 20      GFR est AA 59 (L) >60 ml/min/1.73m2    GFR est non-AA 48 (L) >60 ml/min/1.73m2    Calcium 8.5 8.5 - 10.1 MG/DL    Bilirubin, total 0.2 0.2 - 1.0 MG/DL    ALT (SGPT) 17 16 - 61 U/L    AST (SGOT) 9 (L) 10 - 38 U/L    Alk. phosphatase 112 45 - 117 U/L    Protein, total 6.1 (L) 6.4 - 8.2 g/dL    Albumin 2.7 (L) 3.4 - 5.0 g/dL    Globulin 3.4 2.0 - 4.0 g/dL    A-G Ratio 0.8 0.8 - 1.7     GLUCOSE, POC    Collection Time: 02/28/21  5:33 AM   Result Value Ref Range    Glucose (POC) 138 (H) 70 - 110 mg/dL   POC G3    Collection Time: 02/28/21  5:58 AM   Result Value Ref Range    Device: VENT      FIO2 (POC) 0.3 %    pH (POC) 7.35 7.35 - 7.45      pCO2 (POC) 42.1 35.0 - 45.0 MMHG    pO2 (POC) 72 (L) 80 - 100 MMHG    HCO3 (POC) 23.1 22 - 26 MMOL/L    sO2 (POC) 93 92 - 97 %    Base deficit (POC) 3 mmol/L    Mode ASSIST CONTROL      Tidal volume 430 ml    Set Rate 18 bpm    PEEP/CPAP (POC) 5 cmH2O    PIP (POC) 21      Allens test (POC) N/A      Inspiratory Time 0.9 sec    Total resp. rate 18      Site RIGHT RADIAL      Specimen type (POC) ARTERIAL      Performed by [employer] [facility]     Volume control plus YES     POC G3    Collection Time: 02/28/21  9:03 AM   Result Value Ref Range    Device: VENT      FIO2 (POC) 30 %    pH (POC) 7.37 7.35 - 7.45      pCO2 (POC) 43.5 35.0 - 45.0 MMHG    pO2 (POC) 103 (H) 80 - 100 MMHG    HCO3 (POC) 25.2 22 - 26 MMOL/L    sO2 (POC) 98 (H) 92 - 97 %    Base excess (POC) 0 mmol/L    Mode CPAP/SPON      PEEP/CPAP (POC) 5 cmH2O    Pressure support 7 cmH2O    Allens test (POC) YES      Total resp.  rate 11      Site LEFT RADIAL      Specimen type (POC) ARTERIAL      Performed by Cortes Yanes            Recent Labs     02/28/21  0903 02/28/21  0558 02/27/21  0111   FIO2I 30 0.3 0.9   HCO3I 25.2 23.1 25.2   PCO2I 43.5 42.1 50.7*   PHI 7.37 7.35 7.31*   PO2I 103* 72* 453*       All Micro Results     Procedure Component Value Units Date/Time    CULTURE, BLOOD [[id number]] Collected: 02/26/21 1900    Order Status: Completed Specimen: Blood Updated: 02/28/21 0747     Special Requests: NO SPECIAL REQUESTS        Culture result: NO GROWTH 2 DAYS       CULTURE, BLOOD [[id number]] Collected: 02/26/21 1915    Order Status: Completed Specimen: Blood Updated: 02/28/21 0747     Special Requests: NO SPECIAL REQUESTS        Culture result: NO GROWTH 2 DAYS       LEGIONELLA PNEUMOPHILA AG, URINE [[id number]] Collected: 02/27/21 0550    Order Status: Completed Specimen: Urine, random Updated: 02/27/21 1330     Legionella Ag, urine Negative       CULTURE, RESPIRATORY/SPUTUM/BRONCH [place] STAIN [[id number]]     Order Status: Sent Specimen: Sputum,ET Suction     MYCOPLASMA GENITALIUM, ANKITA, URINE [[id number]] Collected: 02/27/21 0550    Order Status: Completed Specimen: Urine Updated: 02/27/21 0609    COVID-19 RAPID TEST [[id number]] Collected: 02/26/21 1910    Order Status: Completed Specimen: Nasopharyngeal Updated: 02/26/21 1937     Specimen source Nasopharyngeal        COVID-19 rapid test Not detected        Comment: Rapid Abbott ID Now       Rapid NAAT:  The specimen is NEGATIVE for SARS-CoV-2, the novel coronavirus associated with COVID-19. Negative results should be treated as presumptive and, if inconsistent with clinical signs and symptoms or necessary for patient management, should be tested with an alternative molecular assay. Negative results do not preclude SARS-CoV-2 infection and should not be used as the sole basis for patient management decisions. This test has been authorized by the FDA under an Emergency Use Authorization (EUA) for use by authorized laboratories. Fact sheet for Healthcare Providers: ConventionUpdate.co.nz  Fact sheet for Patients: ConventionUpdate.co.nz       Methodology: Isothermal Nucleic Acid Amplification               Telemetry: normal sinus rhythm    6/15/21 ECHO  · Image quality for this study was technically difficult. Contrast used: DEFINITY. · Normal cavity size, wall thickness and systolic function (ejection fraction normal). Estimated left ventricular ejection fraction is 60 - 65%. Mild (grade 1) left ventricular diastolic dysfunction E/E' ratio is 11. 16.  · Mildly dilated left atrium. · Mildly dilated right ventricle. · Mildly dilated right atrium. · Probably trileaflet aortic valve. · Mitral valve non-specific thickening. Mild mitral annular calcification. Trace mitral valve regurgitation is present. · Pulmonary arterial systolic pressure is 25 mmHg. Pulmonary hypertension not suggested by Doppler findings. · Severely elevated central venous pressure (15+ mmHg); IVC diameter is larger than 21 mm and collapses less than 50% with respiration. Imaging:  [x]I have personally reviewed the patients chest radiographs images and report   2/28/21  CXR port [preliminary]  ET tube OG tube in position  No new pulmonary infiltrates          2/26/21  CHEST RADIOGRAPH   CLINICAL INDICATION/HISTORY: ACUTE RESP DISTRESS  -Additional: None   COMPARISON: 2/2/2021   TECHNIQUE: Portable frontal view of the chest   _______________   FINDINGS:   SUPPORT DEVICES: Endotracheal tube 6.8 cm above the juliana.   HEART AND MEDIASTINUM: No appreciable cardiomegaly. Remaining mediastinal  contours within normal limits.   LUNGS AND PLEURAL SPACES: The calcified pleural plaque remains. Parenchymal  scarring is seen in the upper and lower lobe on the right about the same. Left  lung appears clear. No effusion. No pneumothorax.   BONY THORAX AND SOFT TISSUES: Unremarkable.   _______________   IMPRESSION   1. No significant change other than Saqib of endotracheal tube, which appears  in satisfactory position        Results from Hospital Encounter encounter on 02/26/21   XR ABD (KUB)    Narrative History: Tube placement. COMPARISON: 7/7/2019    Single portable view supine of the abdomen performed. Visualized bowel gas  appears unremarkable.  There is a nasogastric-type tube with its tip and sidehole  in the region of the distal stomach. Impression NG tube in the distal stomach. Results from [place] encounter on 05/29/20   CT LOW DOSE LUNG CANCER SCREENING    Narrative EXAM: CT Chest, lung cancer screening    CLINICAL INDICATION/HISTORY: Lung cancer screening, cigarette/nicotine  dependence. COMPARISON: CTA of the chest 10/31/2019. TECHNIQUE: Chest CT from thoracic inlet through the diaphragm without contrast.  A low-dose lung cancer screening protocol was performed. Note that visualization  of non-pulmonary soft tissue structures is limited with this protocol. Coronal  and sagittal reformats were generated and reviewed. One or more dose reduction  techniques were used on this CT: automated exposure control, adjustment of the  mAs and/or kVp according to patient size, and iterative reconstruction  techniques. The specific techniques used on this CT exam have been documented  in the patient's electronic medical record. Digital Imaging and Communications  in Medicine (DICOM) format image data are available to nonaffiliated external  healthcare facilities or entities on a secure, media free, reciprocally  searchable basis with patient authorization for at least a 12-month period after  this study. DLP: 67    _______________    FINDINGS:    LUNGS:    Nodules: No suspicious nodule. Other pulmonary findings:  Focally calcified area of soft tissue thickening along the right pleural margin  both anteriorly and posteriorly is unchanged in appearance since prior  examination. OTHER:     Coronary artery ossifications are present.     _______________        Impression IMPRESSION:    No suspicious pulmonary nodule. Focal areas of peripheral pleural thickening  with peripheral calcification is unchanged in appearance since prior  examinations. Lung-RADS 1 - Negative.     Management: Continue annual screening with low dose CT in 12 months. Lung cancer screening categorization and recommendations per the [employer] of radiology Lung-RADS version 1.1.          [x]See my orders for details          Calli Arreguin MD

## 2021-03-01 LAB
ANION GAP SERPL CALC-SCNC: 7 MMOL/L (ref 3–18)
BASOPHILS # BLD: 0 K/UL (ref 0–0.1)
BASOPHILS NFR BLD: 0 % (ref 0–2)
BUN SERPL-MCNC: 38 MG/DL (ref 7–18)
BUN/CREAT SERPL: 31 (ref 12–20)
CALCIUM SERPL-MCNC: 8.3 MG/DL (ref 8.5–10.1)
CHLORIDE SERPL-SCNC: 106 MMOL/L (ref 100–111)
CO2 SERPL-SCNC: 25 MMOL/L (ref 21–32)
CREAT SERPL-MCNC: 1.23 MG/DL (ref 0.6–1.3)
DIFFERENTIAL METHOD BLD: ABNORMAL
EOSINOPHIL # BLD: 0 K/UL (ref 0–0.4)
EOSINOPHIL NFR BLD: 0 % (ref 0–5)
ERYTHROCYTE [DISTWIDTH] IN BLOOD BY AUTOMATED COUNT: 13.8 % (ref 11.6–14.5)
GLUCOSE BLD STRIP.AUTO-MCNC: 138 MG/DL (ref 70–110)
GLUCOSE BLD STRIP.AUTO-MCNC: 142 MG/DL (ref 70–110)
GLUCOSE BLD STRIP.AUTO-MCNC: 149 MG/DL (ref 70–110)
GLUCOSE BLD STRIP.AUTO-MCNC: 153 MG/DL (ref 70–110)
GLUCOSE SERPL-MCNC: 126 MG/DL (ref 74–99)
HCT VFR BLD AUTO: 31.2 % (ref 36–48)
HGB BLD-MCNC: 10.1 G/DL (ref 13–16)
LYMPHOCYTES # BLD: 0.5 K/UL (ref 0.9–3.6)
LYMPHOCYTES NFR BLD: 7 % (ref 21–52)
MAGNESIUM SERPL-MCNC: 2.3 MG/DL (ref 1.6–2.6)
MCH RBC QN AUTO: 28.4 PG (ref 24–34)
MCHC RBC AUTO-ENTMCNC: 32.4 G/DL (ref 31–37)
MCV RBC AUTO: 87.6 FL (ref 74–97)
MONOCYTES # BLD: 0.3 K/UL (ref 0.05–1.2)
MONOCYTES NFR BLD: 5 % (ref 3–10)
NEUTS SEG # BLD: 6 K/UL (ref 1.8–8)
NEUTS SEG NFR BLD: 88 % (ref 40–73)
PHOSPHATE SERPL-MCNC: 4 MG/DL (ref 2.5–4.9)
PLATELET # BLD AUTO: 268 K/UL (ref 135–420)
PMV BLD AUTO: 8.4 FL (ref 9.2–11.8)
POTASSIUM SERPL-SCNC: 3.9 MMOL/L (ref 3.5–5.5)
RBC # BLD AUTO: 3.56 M/UL (ref 4.7–5.5)
SODIUM SERPL-SCNC: 138 MMOL/L (ref 136–145)
WBC # BLD AUTO: 6.7 K/UL (ref 4.6–13.2)

## 2021-03-01 PROCEDURE — 74011000250 HC RX REV CODE- 250: Performed by: INTERNAL MEDICINE

## 2021-03-01 PROCEDURE — 92610 EVALUATE SWALLOWING FUNCTION: CPT

## 2021-03-01 PROCEDURE — 92526 ORAL FUNCTION THERAPY: CPT

## 2021-03-01 PROCEDURE — 82962 GLUCOSE BLOOD TEST: CPT

## 2021-03-01 PROCEDURE — 85025 COMPLETE CBC W/AUTO DIFF WBC: CPT

## 2021-03-01 PROCEDURE — 84100 ASSAY OF PHOSPHORUS: CPT

## 2021-03-01 PROCEDURE — 77030027138 HC INCENT SPIROMETER -A

## 2021-03-01 PROCEDURE — 65660000000 HC RM CCU STEPDOWN

## 2021-03-01 PROCEDURE — 74011250636 HC RX REV CODE- 250/636: Performed by: INTERNAL MEDICINE

## 2021-03-01 PROCEDURE — 74011250637 HC RX REV CODE- 250/637: Performed by: HOSPITALIST

## 2021-03-01 PROCEDURE — 94640 AIRWAY INHALATION TREATMENT: CPT

## 2021-03-01 PROCEDURE — 77010033678 HC OXYGEN DAILY

## 2021-03-01 PROCEDURE — 74011250636 HC RX REV CODE- 250/636: Performed by: HOSPITALIST

## 2021-03-01 PROCEDURE — 74011636637 HC RX REV CODE- 636/637: Performed by: INTERNAL MEDICINE

## 2021-03-01 PROCEDURE — 80048 BASIC METABOLIC PNL TOTAL CA: CPT

## 2021-03-01 PROCEDURE — 83735 ASSAY OF MAGNESIUM: CPT

## 2021-03-01 PROCEDURE — C9113 INJ PANTOPRAZOLE SODIUM, VIA: HCPCS | Performed by: INTERNAL MEDICINE

## 2021-03-01 RX ORDER — TAMSULOSIN HYDROCHLORIDE 0.4 MG/1
0.4 CAPSULE ORAL DAILY
Status: DISCONTINUED | OUTPATIENT
Start: 2021-03-02 | End: 2021-03-03 | Stop reason: HOSPADM

## 2021-03-01 RX ORDER — BUDESONIDE 0.5 MG/2ML
500 INHALANT ORAL
Status: DISCONTINUED | OUTPATIENT
Start: 2021-03-01 | End: 2021-03-03 | Stop reason: HOSPADM

## 2021-03-01 RX ORDER — AMLODIPINE BESYLATE 5 MG/1
5 TABLET ORAL DAILY
Status: DISCONTINUED | OUTPATIENT
Start: 2021-03-01 | End: 2021-03-03 | Stop reason: HOSPADM

## 2021-03-01 RX ORDER — INSULIN LISPRO 100 [IU]/ML
INJECTION, SOLUTION INTRAVENOUS; SUBCUTANEOUS
Status: DISCONTINUED | OUTPATIENT
Start: 2021-03-01 | End: 2021-03-03 | Stop reason: HOSPADM

## 2021-03-01 RX ADMIN — INSULIN LISPRO 3 UNITS: 100 INJECTION, SOLUTION INTRAVENOUS; SUBCUTANEOUS at 16:51

## 2021-03-01 RX ADMIN — AZITHROMYCIN MONOHYDRATE 500 MG: 500 INJECTION, POWDER, LYOPHILIZED, FOR SOLUTION INTRAVENOUS at 21:48

## 2021-03-01 RX ADMIN — Medication 10 ML: at 06:37

## 2021-03-01 RX ADMIN — Medication 10 ML: at 21:49

## 2021-03-01 RX ADMIN — CEFTRIAXONE 1 G: 1 INJECTION, POWDER, FOR SOLUTION INTRAMUSCULAR; INTRAVENOUS at 21:48

## 2021-03-01 RX ADMIN — Medication 10 ML: at 15:25

## 2021-03-01 RX ADMIN — METHYLPREDNISOLONE SODIUM SUCCINATE 60 MG: 40 INJECTION, POWDER, FOR SOLUTION INTRAMUSCULAR; INTRAVENOUS at 06:37

## 2021-03-01 RX ADMIN — AMLODIPINE BESYLATE 5 MG: 5 TABLET ORAL at 15:24

## 2021-03-01 RX ADMIN — IPRATROPIUM BROMIDE AND ALBUTEROL SULFATE 3 ML: .5; 3 SOLUTION RESPIRATORY (INHALATION) at 15:25

## 2021-03-01 RX ADMIN — METHYLPREDNISOLONE SODIUM SUCCINATE 40 MG: 40 INJECTION, POWDER, FOR SOLUTION INTRAMUSCULAR; INTRAVENOUS at 15:24

## 2021-03-01 RX ADMIN — IPRATROPIUM BROMIDE AND ALBUTEROL SULFATE 3 ML: .5; 3 SOLUTION RESPIRATORY (INHALATION) at 07:21

## 2021-03-01 RX ADMIN — IPRATROPIUM BROMIDE AND ALBUTEROL SULFATE 3 ML: .5; 3 SOLUTION RESPIRATORY (INHALATION) at 04:55

## 2021-03-01 RX ADMIN — HEPARIN SODIUM 5000 UNITS: 5000 INJECTION INTRAVENOUS; SUBCUTANEOUS at 17:34

## 2021-03-01 RX ADMIN — IPRATROPIUM BROMIDE AND ALBUTEROL SULFATE 3 ML: .5; 3 SOLUTION RESPIRATORY (INHALATION) at 19:58

## 2021-03-01 RX ADMIN — METHYLPREDNISOLONE SODIUM SUCCINATE 60 MG: 40 INJECTION, POWDER, FOR SOLUTION INTRAMUSCULAR; INTRAVENOUS at 00:33

## 2021-03-01 RX ADMIN — HEPARIN SODIUM 5000 UNITS: 5000 INJECTION INTRAVENOUS; SUBCUTANEOUS at 09:48

## 2021-03-01 RX ADMIN — IPRATROPIUM BROMIDE AND ALBUTEROL SULFATE 3 ML: .5; 3 SOLUTION RESPIRATORY (INHALATION) at 00:06

## 2021-03-01 RX ADMIN — IPRATROPIUM BROMIDE AND ALBUTEROL SULFATE 3 ML: .5; 3 SOLUTION RESPIRATORY (INHALATION) at 11:44

## 2021-03-01 RX ADMIN — METHYLPREDNISOLONE SODIUM SUCCINATE 40 MG: 40 INJECTION, POWDER, FOR SOLUTION INTRAMUSCULAR; INTRAVENOUS at 21:48

## 2021-03-01 RX ADMIN — HEPARIN SODIUM 5000 UNITS: 5000 INJECTION INTRAVENOUS; SUBCUTANEOUS at 03:10

## 2021-03-01 RX ADMIN — BUDESONIDE 500 MCG: 0.5 INHALANT RESPIRATORY (INHALATION) at 19:58

## 2021-03-01 RX ADMIN — PANTOPRAZOLE SODIUM 40 MG: 40 INJECTION, POWDER, FOR SOLUTION INTRAVENOUS at 09:48

## 2021-03-01 NOTE — PROGRESS NOTES
Occupational Therapy Evaluation Attempt    Chart reviewed. Attempted Occupational Therapy Evaluation, however, patient unable to be seen due to:  []  Nausea/vomiting  [x]  Eating  []  Pain  []  Patient too lethargic  []  Off Unit for testing/procedure  []  Dialysis treatment in progress   []  Telemetry Results  []  Other:     Will follow up later as patient's schedule allows.    Thank you for this referral.  Jeannie Tillman OTRAMSEY/GLENN MOT

## 2021-03-01 NOTE — PROGRESS NOTES
Problem: Dysphagia (Adult)  Goal: *Acute Goals and Plan of Care (Insert Text)  Description: Recommendations:  Diet: Soft solids/thin liquid   Meds: Per patient preference  Aspiration Precautions  Oral Care TID  Other: Slow rate, small bites/sips    Goals:  Patient will:  1. Tolerate PO trials with 0 s/s overt distress in 4/5 trials  2. Utilize compensatory swallow strategies/maneuvers (decrease bite/sip, size/rate, alt. liq/sol) with min cues in 4/5 trials  3. Perform oral-motor/laryngeal exercises to increase oropharyngeal swallow function with min cues  4. Complete an objective swallow study (i.e., MBSS) to assess swallow integrity, r/o aspiration, and determine of safest LRD, min A  Outcome: Progressing Towards Goal      SPEECH LANGUAGE PATHOLOGY BEDSIDE SWALLOW   EVALUATION & TREATMENT     Patient: Sb Valdes (64 y.o. male)  Date: 3/1/2021  Primary Diagnosis: Acute respiratory failure ([address] [J96.00]        Precautions: Aspiration     PLOF: Independent    ASSESSMENT :  Based on the objective data described below, the patient presents with mild oropharyngeal dysphagia s/p extubation 2/28/21. A&Ox4. [place]. Patient familiar to this caseload from previous hospitalizations. Most recent MBSS completed 6/15/21 without aspiration, rec: dental soft, thin liquid. Oral-motor exam revealed pt with incomplete dentition; however, all other structures grossly intact for mastication and deglutition. Patient observed self-feeding thin liquid +/- straw, puree and solid trials. Demo x1 throat clear following first of 10 trials. Pt exhibited mildly delayed mastication of solids with otherwise adequate bolus cohesion, manipulation and A-P transit. Further exhibited adequate swallow timing/reflex and hyolaryngeal excursion. Recommend soft solid, thin liquid diet with aspiration precautions. D/w RN, Ashley Multani and Dr. Oliverio Stauffer.      TREATMENT :  Skilled therapy initiated; Educated pt on aspiration precautions and importance of compensatory swallow techniques to decrease aspiration risk (decrease rate of intake & sip/bite size, upright @HOB for all po intake and ~30 minutes after po); verbalized comprehension. SLP to follow as indicated. Patient will benefit from skilled intervention to address the above impairments. Patient's rehabilitation potential is considered to be Good  Factors which may influence rehabilitation potential include:   []            None noted  []            Mental ability/status  [x]            Medical condition  []            Home/family situation and support systems  []            Safety awareness  []            Pain tolerance/management  []            Other:      PLAN :  Recommendations and Planned Interventions:  Soft solids, thin liquid   Frequency/Duration: Patient will be followed by speech-language pathology 1-2 times per day/4-7 days per week to address goals. Discharge Recommendations: To Be Determined     SUBJECTIVE:   Patient stated I haven't eaten since Friday. OBJECTIVE:     Past Medical History:   Diagnosis Date    Brain aneurysm     Cancer ([facility]     prostate    COPD (chronic obstructive pulmonary disease) ([facility] [address]     Hypertension     Nocturia     Pneumonia     Radiation effect      Past Surgical History:   Procedure Laterality Date    HX HERNIA REPAIR       Home Situation:        Diet prior to admission: Regular/thin liquid   Current Diet:  Soft solid/thin liquid      Cognitive and Communication Status:  Neurologic State: Alert  Orientation Level: Oriented X4  Cognition: Appropriate decision making, Appropriate for age attention/concentration, Appropriate safety awareness  Perception: Appears intact  Perseveration: No perseveration noted  Safety/Judgement: Awareness of environment  Oral Assessment:  Oral Assessment  Labial: No impairment  Dentition: Natural;Extractions  Oral Hygiene: Good  Lingual: No impairment  Velum: No impairment  Mandible: No impairment  P.O.  Trials:  Patient Position: HOB 60  Vocal quality prior to P.O.: No impairment  Consistency Presented: Thin liquid;Puree; Solid  How Presented: Self-fed/presented;Straw;Successive swallows     Bolus Acceptance: No impairment  Bolus Formation/Control: Impaired  Type of Impairment: Delayed;Mastication  Propulsion: No impairment  Oral Residue: None  Initiation of Swallow: No impairment  Laryngeal Elevation: Functional  Aspiration Signs/Symptoms: None  Pharyngeal Phase Characteristics: Effortful swallow  Effective Modifications: Alternate liquids/solids;Small sips and bites  Cues for Modifications: Minimal       Oral Phase Severity: Mild  Pharyngeal Phase Severity : Mild    PAIN:  Pain level pre-treatment: 0/10   Pain level post-treatment: 0/10     After treatment:   []            Patient left in no apparent distress sitting up in chair  [x]            Patient left in no apparent distress in bed  [x]            Call bell left within reach  [x]            Nursing notified  []            Family present  []            Caregiver present  []            Bed alarm activated    COMMUNICATION/EDUCATION:   [x]            Aspiration precautions; swallow safety; compensatory techniques. [x]            Patient/family have participated as able in goal setting and plan of care. [x]            Patient/family agree to work toward stated goals and plan of care. []            Patient understands intent and goals of therapy; neutral about participation. []            Patient unable to participate in goal setting/plan of care; educ ongoing with interdisciplinary staff  []         Posted safety precautions in patient's room.     Thank you for this referral.  FADI Chaudhary  Time Calculation: 16 mins  Evaluation Time: 8 minutes   Treatment Time: 8 minutes

## 2021-03-01 NOTE — PROGRESS NOTES
1920-Bedside verbal report received from PAUL Wooten RN. Sbar, mar, labs, kardex and patient status reviewed. Assumed care of patient. 2000-Assessment completed. Pt is alert and oriented x4. Follows commands. Coarse, diminished lung sounds. Remains NPO after failing bedside swallow. 0000-Assessment completed. No changes    0400-Assessment completed. No changes. 0715-Bedside verbal report given to PAUL Wooten RN. Sbar,mar, labs, kardex and patient status reviewed. Relinquished care of patient.

## 2021-03-01 NOTE — PROGRESS NOTES
Hospitalist Progress Note    Patient: Jacqueline Antunez MRN: [id number]  CSN: [id number]    YOB: 1945  Age: 66 y.o. Sex: male    DOA: 2/26/2021 LOS:  LOS: 3 days          Chief Complaint:     Ac resp failure      Assessment/Plan       Acute on chronic resp failure-extubated 2/28, covid negative  COPD exacerbation, improved, wean steroids, nebs PRN, empiric IV abx  Dysphagia, cleared by speech for soft diet today  Tobacco use  HTN, start norvasc  PAF, monitor    Transfer to tele  Start PT  Labs reviewed    DVT proph-heparin    D/c planning for 1-2 days        Disposition :  Patient Active Problem List   Diagnosis Code    Hypertension I10    COPD (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 ([facility]) J44.9    Chronic renal disease, stage 3, moderately decreased glomerular filtration rate between 30-59 mL/min/1.73 square meter N18.30    Asbestosis ([facility] [facility] J61    Acute exacerbation of chronic obstructive pulmonary disease (COPD) ([facility] [facility] J44.1    Debility R53.81    Paroxysmal atrial fibrillation ([facility]) I48.0    Chronic respiratory failure with hypoxia ([facility]) J96.11    Tobacco dependence F17.200    Hyponatremia E87.1    Pleural effusion, right J90    COPD with acute exacerbation ([facility] [address] J44.1    Acute respiratory failure with hypoxia and hypercarbia ([facility]) J96.01, J96.02    Hyponatremia E87.1    Advanced care planning/counseling discussion Z71.89    Hypokalemia E87.6    COPD (chronic obstructive pulmonary disease) ([facility]) J44.9    CAP (community acquired pneumonia) J18.9    Respiratory distress R06.03    COPD exacerbation ([facility]) J44.1    Atrial fibrillation with RVR ([facility]) I48.91    Acute respiratory failure ([facility]) J96.00       Subjective:    I feel ok  [place] but hungry  Has one hearing aid    Denies CP, nausea  No issues per nursing    Review of systems:    Constitutional: denies fevers  Respiratory: denies SOB, cough  Cardiovascular: denies chest pain  Gastrointestinal: denies nausea, vomiting, diarrhea      Vital signs/Intake and Output:  Visit Vitals  BP (!) 159/64   Pulse 81   Temp 98.6 °F (37 °C)   Resp 25   Ht 5' 8\" (1.727 m)   Wt 94.2 kg (207 lb 10.8 oz)   SpO2 98%   BMI 31.58 kg/m²     Current Shift:  No intake/output data recorded. Last three shifts:  02/27 1901 - 03/01 0700  In: 648.5 [I.V.:648.5]  Out: 1175 [Urine:1175]    Exam:    General: elderly obese Wm, NAD, alert and talking  CVS:Regular rate and rhythm, no M/R/G, S1/S2 heard, no thrill  Lungs:Clear to auscultation bilaterally, no wheezes, rhonchi, or rales  Abdomen: Soft, Nontender, No distention, Normal Bowel sounds, No hepatomegaly  Extremities: No C/C/E, pulses palpable 2+  Neuro:grossly normal , follows commands  Psych:appropriate                Labs: Results:       Chemistry Recent Labs     03/01/21  0510 02/28/21 0445 02/26/21 1915   * 139* 177*    137 136   K 3.9 4.2 4.2    102 101   CO2 25 23 29   BUN 38* 24* 23*   CREA 1.23 1.42* 1.49*   CA 8.3* 8.5 8.9   AGAP 7 12 6   BUCR 31* 17 15   AP  --  112 129*   TP  --  6.1* 6.8   ALB  --  2.7* 3.4   GLOB  --  3.4 3.4   AGRAT  --  0.8 1.0      CBC w/Diff Recent Labs     03/01/21  0510 02/28/21 0445 02/26/21 1915   WBC 6.7 5.7 11.3   RBC 3.56* 3.88* 4.11*   HGB 10.1* 10.7* 11.7*   HCT 31.2* 33.6* 36.4    296 377   GRANS 88* 87* 53   LYMPH 7* 11* 30   EOS 0 0 7*      Cardiac Enzymes Recent Labs     02/27/21  0144 02/26/21 1915   CPK 66 64   CKND1 5.5* 4.8*      Coagulation No results for input(s): PTP, INR, APTT, INREXT in the last 72 hours. Lipid Panel No results found for: CHOL, CHOLPOCT, CHOLX, CHLST, CHOLV, 209804, HDL, HDLP, LDL, LDLC, DLDLP, 554944, VLDLC, VLDL, TGLX, TRIGL, TRIGP, TGLPOCT, CHHD, CHHDX   BNP No results for input(s): BNPP in the last 72 hours.    Liver Enzymes Recent Labs     02/28/21  0445   TP 6.1*   ALB 2.7*         Thyroid Studies Lab Results   Component Value Date/Time    TSH 0.86 10/20/2019 01:05 AM Procedures/imaging: see electronic medical records for all procedures/Xrays and details which were not copied into this note but were reviewed prior to creation of Moe Davidson MD

## 2021-03-01 NOTE — PROGRESS NOTES
[facility]ists  Pulmonary, Critical Care, and Sleep Medicine    Name: Sedrick Trujillo MRN: [id number]   : 1945 Hospital: [facility] [place]   Date: 3/1/2021        Pulmonary Critical Care Note    IMPRESSION:   · Acute on chronic hypercapnic hypoxic respiratory failure · J96.22 ·   COPD exacerbation · J44.1 ·   Hypotension · I95.9 ·   URIEL · N17.9     Patient Active Problem List   Diagnosis Code    Hypertension I10    COPD (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 ([facility]) J44.9    Chronic renal disease, stage 3, moderately decreased glomerular filtration rate between 30-59 mL/min/1.73 square meter N18.30    Asbestosis ([facility] [address] J61    Acute exacerbation of chronic obstructive pulmonary disease (COPD) ([facility] [address] J44.1    Debility R53.81    Paroxysmal atrial fibrillation ([facility]) I48.0    Chronic respiratory failure with hypoxia ([facility]) J96.11    Tobacco dependence F17.200    Hyponatremia E87.1    Pleural effusion, right J90    COPD with acute exacerbation ([facility]) J44.1    Acute respiratory failure with hypoxia and hypercarbia ([facility]) J96.01, J96.02    Hyponatremia E87.1    Advanced care planning/counseling discussion Z71.89    Hypokalemia E87.6    COPD (chronic obstructive pulmonary disease) ([facility]) J44.9    CAP (community acquired pneumonia) J18.9    Respiratory distress R06.03    COPD exacerbation ([facility]) J44.1    Atrial fibrillation with RVR ([facility]) I48.91    Acute respiratory failure ([facility]) J96.00      RECOMMENDATIONS:   Respiratory:   Extubated successfully to room air on 2021. Remains on 2 LPM O2. Protecting airway well. Bronchodilators: Continue DuoNeb every 4 hours. Add Pulmicort nebs twice daily. Continue DuoNeb as needed. Antibiotics: Rocephin and Zithromax for COPD exacerbation/bronchitis. Steroids: Continue Solu-Medrol 40 mg IV every 8 hours today; titrate per clinical course.     ID: no evidence of pneumonia  Rapid test negative for SARS CoV2 2021- low clinical suspicion for COVID19  Blood culture 2/26/2021: NGTD. Urine antigen panel 2/27/2021: Negative. Antibiotic choice: Rocephin and Zithromax for COPD exacerbation and possible bronchitis. Follow cultures and de-escalate antibiotics appropriately. CVS: improved hemodynamics  Not on any vasopressors. ECHO 2/27/21  · Image quality for this study was suboptimal.  · Contrast used: DEFINITY. · Left Ventricle: Normal cavity size, wall thickness and systolic function (ejection fraction normal). The estimated EF is 50 - 55%. There is mild (grade 1) left ventricular diastolic dysfunction E'E= 10.05. Poor resolution of endocardial border. Can not comment on wall motion abnormality  · Tricuspid Valve: Tricuspid valve not well visualized. No stenosis. Tricuspid regurgitation is inadequate for estimation of right ventricular systolic pressure. Renal: Creatinine improving. Making good urine output. Replace electrolytes per ICU protocol as needed. Heme: stable Hgb, plt  No evidence of active bleeding  Endo: Glycemic control as needed with steroid  GI: Discussed with speech therapist; passed swallow evaluation. Diet per speech therapist recommendation. Neurology: Hard of hearing but nothing acute. Will defer respective systems problem management to primary and other consultant and follow patient in ICU with primary and other medical team  Further recommendations will be based on the patient's response to recommended treatment and results of the investigation ordered. Quality Care: PPI, DVT prophylaxis, HOB elevated, Infection control all reviewed and addressed. PAIN AND SEDATION: None  Skin/Wound: none  Prophylaxis:  DVT prophylaxis Heparin. GI prophylaxis Protonix. Restraints: None. PT/OT eval and treat: Ordered. Lines/Tubes:   ETT: 2/26/21 - 1/28/21  OGT/NGT: 2/26/21- 1/28/21  Central line: 2/26/21 (site examined, no erythema, induration, discharge or sign of infection. Dressing intact. Medically necessary, will remove it when not needed. Central line bundle followed). Central line to be d/w 3/1/21 before transfer out of icu, d/w CAITLIN Humphreys: 2/26/21 (Medically necessary for strict input/output monitoring in critically ill patient, will remove it when not needed. Humphreys bundle followed). To be d/c today, d/w RN    ADVANCE DIRECTIVE: full code. Palliative care consult per clinical course  DISCUSSION: Events and notes from last 24 hours reviewed. Care plan discussed with nursing, hospitalist, RT    Quality Care: PPI, DVT prophylaxis, HOB elevated, Infection control all reviewed and addressed. Moderate complexity decision making was performed during the evaluation of this patient at high risk for decompensation with multiple organ involvement. Transfer to telemetry. Once transferred, PCCM will follow from pulmonary perspective. Subjective/History:   Mr. Gwendolyn Barakat has been seen and evaluated as Dr. Randell Gowers requested for assisting with Acute on chronic hypercapnic hypoxic respiratory failure, COPD exacerbation. The patient can not provide additional history due to Ventilated and sedated. Patient is a 66 y.o. male with following PMHx presented via EMS with progressive worsening in his SOB, tripoding and failed to respond to NRB, HFNC and remained obtunded requiring intubation and ventilator support. He was recently discharged from [facility] 3 weeks ago after COPD exacerbation related admission. He followed with Dr. Karolina Rodriguez in our office. Poor adherence to treatments. In ER, SARS CoV2 rapid negative; CXR no pulmonary infiltrates. After intubation required multiple sedatives for ventilator synchrony. Developed hypotension requiring vasopressor support. CVC placed by ER provider. No reported fever, chills, sore throat per staff. Other information limited. Pt seen in ICU at bedside rm 105 in droplet isolation.       03/01/21  Remains in icu room 105  Extubated yesterday  respi status remains stable overnight. On 2 LPM O2  Hard of hearing  D/w ST for eval now. No fever  BP stable  No other issues overnight   PCCM was not called for any issues overnight. Review of Systems:  Review of systems not obtained due to patient factors. Latest lactic acid:   Lactic acid   Date Value Ref Range Status   08/04/2020 1.3 0.4 - 2.0 MMOL/L Final   06/13/2020 1.2 0.4 - 2.0 MMOL/L Final   04/09/2020 1.1 0.4 - 2.0 MMOL/L Final       Past Medical History:  Past Medical History:   Diagnosis Date    Brain aneurysm     Cancer ([facility] [address]     prostate    COPD (chronic obstructive pulmonary disease) ([facility] [address]     Hypertension     Nocturia     Pneumonia     Radiation effect         Past Surgical History:  Past Surgical History:   Procedure Laterality Date    HX HERNIA REPAIR          Medications:  Prior to Admission medications    Medication Sig Start Date End Date Taking? Authorizing Provider   predniSONE (STERAPRED) 5 mg dose pack See administration instruction per 5mg dose pack 2/7/21   Ronak Jordan MD   budesonide (PULMICORT) 0.5 mg/2 mL nbsp 2 mL by Nebulization route two (2) times a day. 2/7/21   Ronak Jordan MD   albuterol-ipratropium (DUO-NEB) 2.5 mg-0.5 mg/3 ml nebu 3 mL by Nebulization route every four (4) hours as needed for Wheezing. 2/7/21   Ronak Jordan MD   albuterol (PROVENTIL HFA, VENTOLIN HFA, PROAIR HFA) 90 mcg/actuation inhaler Take 2 Puffs by inhalation every four (4) hours as needed for Wheezing or Shortness of Breath. 2/7/21   Ronak Jordan MD   arformoteroL (BROVANA) 15 mcg/2 mL nebu neb solution 2 mL by Nebulization route two (2) times a day. 2/7/21   Ronak Jordan MD   doxycycline (MONODOX) 100 mg capsule Take 1 Cap by mouth two (2) times a day. 2/7/21   Ronak Jordan MD   OXYGEN-AIR DELIVERY SYSTEMS 2 L/min by Nasal route continuous. Provider, Renata   furosemide (Lasix) 20 mg tablet Take 20 mg by mouth daily. Provider, Historical   dilTIAZem (CARDIZEM) 30 mg tablet Take 1 Tab by mouth Before breakfast, lunch, and dinner. Patient taking differently: Take 30 mg by mouth daily. Daily 4/14/20   Gini Severe, MD   acetaminophen (TYLENOL) 325 mg tablet Take 650 mg by mouth every eight (8) hours as needed for Pain. Provider, Historical   dextran 70/hypromellose (ARTIFICIAL TEARS, PF, OP) Apply 2 Drops to eye as needed for Other (both eyes for dryness). Provider, Historical   diphenhydrAMINE (BENADRYL) 25 mg capsule Take 25 mg by mouth nightly as needed for Itching. Provider, Historical   tamsulosin (FLOMAX) 0.4 mg capsule Take 0.4 mg by mouth every evening. Other, MD Blayne       Current Facility-Administered Medications   Medication Dose Route Frequency    albuterol-ipratropium (DUO-NEB) 2.5 MG-0.5 MG/3 ML  3 mL Nebulization Q4H RT    azithromycin (ZITHROMAX) 500 mg in 0.9% sodium chloride 250 mL (VIAL-MATE)  500 mg IntraVENous Q24H    methylPREDNISolone (PF) (SOLU-MEDROL) injection 60 mg  60 mg IntraVENous Q6H    sodium chloride (NS) flush 5-40 mL  5-40 mL IntraVENous Q8H    heparin (porcine) injection 5,000 Units  5,000 Units SubCUTAneous Q8H    insulin lispro (HUMALOG) injection   SubCUTAneous Q6H    cefTRIAXone (ROCEPHIN) 1 g in sterile water (preservative free) 10 mL IV syringe  1 g IntraVENous Q24H    pantoprazole (PROTONIX) 40 mg in 0.9% sodium chloride 10 mL injection  40 mg IntraVENous DAILY       Allergy:  Allergies   Allergen Reactions    Aspirin Other (comments)     \"messes my stomach up\"        Social History:  Social History     Tobacco Use    Smoking status: Former Smoker     Types: Cigarettes    Smokeless tobacco: Never Used   Substance Use Topics    Alcohol use: No    Drug use: Never        Family History:  Family History   Problem Relation Age of Onset    Heart Disease Mother           Objective:   Vital Signs:    Blood pressure (!) 159/64, pulse 81, temperature 98.6 °F (37 °C), resp. rate 25, height 5' 8\" (1.727 m), weight 94.2 kg (207 lb 10.8 oz), SpO2 98 %. Body mass index is 31.58 kg/m². O2 Device: Nasal cannula   O2 Flow Rate (L/min): 2 l/min   Temp (24hrs), Av.5 °F (36.9 °C), Min:98.3 °F (36.8 °C), Max:98.6 °F (37 °C)         Intake/Output:   Last shift:      No intake/output data recorded. Last 3 shifts:  190 -  0700  In: 648.5 [I.V.:648.5]  Out: 1175 [Urine:1175]    Intake/Output Summary (Last 24 hours) at 3/1/2021 0859  Last data filed at 3/1/2021 0400  Gross per 24 hour   Intake 32.2 ml   Output 600 ml   Net -567.8 ml       Ventilator Settings:  Mode Rate Tidal Volume Pressure FiO2 PEEP   Spontaneous   430 ml  7 cm H2O 28 % 5 cm H20     Peak airway pressure: 21 cm H2O    Minute ventilation: 11.1 l/min      Lung protective strategy, Pl pressure goals less than or equal to 30. Physical Exam:  General/Neurology: Alert, awake and oriented. NAD. On NC O2. Hard of hearing. Head:   NCAT. Eye:   EOM intact. No icterus/pallor/cyanosis. Nose:   No nasal drainage/discharge. Throat:  Moist mucosa. Neck:   Trachea midline. Lung: Moderate air entry bilateral equal.  No rhonchi or wheezing. No prolonged expiration. No accessory muscle use. Heart:   S1 S2 present. No murmur or JVD. Abdomen:  Soft. NT. ND. No palpable masses. Extremities:  No edema. No cyanosis or clubbing. Pulses: 2+ and symmetric in DP. Data:     Recent Results (from the past 24 hour(s))   POC G3    Collection Time: 21  9:03 AM   Result Value Ref Range    Device: VENT      FIO2 (POC) 30 %    pH (POC) 7.37 7.35 - 7.45      pCO2 (POC) 43.5 35.0 - 45.0 MMHG    pO2 (POC) 103 (H) 80 - 100 MMHG    HCO3 (POC) 25.2 22 - 26 MMOL/L    sO2 (POC) 98 (H) 92 - 97 %    Base excess (POC) 0 mmol/L    Mode CPAP/SPON      PEEP/CPAP (POC) 5 cmH2O    Pressure support 7 cmH2O    Allens test (POC) YES      Total resp.  rate 11      Site LEFT RADIAL      Specimen type (POC) ARTERIAL      Performed by [facility]    GLUCOSE, POC    Collection Time: 02/28/21 12:28 PM   Result Value Ref Range    Glucose (POC) 130 (H) 70 - 110 mg/dL   GLUCOSE, POC    Collection Time: 02/28/21  5:54 PM   Result Value Ref Range    Glucose (POC) 132 (H) 70 - 110 mg/dL   GLUCOSE, POC    Collection Time: 03/01/21 12:37 AM   Result Value Ref Range    Glucose (POC) 138 (H) 70 - 110 mg/dL   CBC WITH AUTOMATED DIFF    Collection Time: 03/01/21  5:10 AM   Result Value Ref Range    WBC 6.7 4.6 - 13.2 K/uL    RBC 3.56 (L) 4.70 - 5.50 M/uL    HGB 10.1 (L) 13.0 - 16.0 g/dL    HCT 31.2 (L) 36.0 - 48.0 %    MCV 87.6 74.0 - 97.0 FL    MCH 28.4 24.0 - 34.0 PG    MCHC 32.4 31.0 - 37.0 g/dL    RDW 13.8 11.6 - 14.5 %    PLATELET 593 355 - 399 K/uL    MPV 8.4 (L) 9.2 - 11.8 FL    NEUTROPHILS 88 (H) 40 - 73 %    LYMPHOCYTES 7 (L) 21 - 52 %    MONOCYTES 5 3 - 10 %    EOSINOPHILS 0 0 - 5 %    BASOPHILS 0 0 - 2 %    ABS. NEUTROPHILS 6.0 1.8 - 8.0 K/UL    ABS. LYMPHOCYTES 0.5 (L) 0.9 - 3.6 K/UL    ABS. MONOCYTES 0.3 0.05 - 1.2 K/UL    ABS. EOSINOPHILS 0.0 0.0 - 0.4 K/UL    ABS.  BASOPHILS 0.0 0.0 - 0.1 K/UL    DF AUTOMATED     METABOLIC PANEL, BASIC    Collection Time: 03/01/21  5:10 AM   Result Value Ref Range    Sodium 138 136 - 145 mmol/L    Potassium 3.9 3.5 - 5.5 mmol/L    Chloride 106 100 - 111 mmol/L    CO2 25 21 - 32 mmol/L    Anion gap 7 3.0 - 18 mmol/L    Glucose 126 (H) 74 - 99 mg/dL    BUN 38 (H) 7.0 - 18 MG/DL    Creatinine 1.23 0.6 - 1.3 MG/DL    BUN/Creatinine ratio 31 (H) 12 - 20      GFR est AA >60 >60 ml/min/1.73m2    GFR est non-AA 57 (L) >60 ml/min/1.73m2    Calcium 8.3 (L) 8.5 - 10.1 MG/DL   MAGNESIUM    Collection Time: 03/01/21  5:10 AM   Result Value Ref Range    Magnesium 2.3 1.6 - 2.6 mg/dL   PHOSPHORUS    Collection Time: 03/01/21  5:10 AM   Result Value Ref Range    Phosphorus 4.0 2.5 - 4.9 MG/DL           Recent Labs     02/28/21  0903 02/28/21  0558 02/27/21  0111   FIO2I 30 0.3 0.9   HCO3I 25.2 23.1 25.2   PCO2I 43.5 42.1 50.7* PHI 7.37 7.35 7.31*   PO2I 103* 72* 453*       All Micro Results     Procedure Component Value Units Date/Time    CULTURE, BLOOD [[id number]] Collected: 02/26/21 1900    Order Status: Completed Specimen: Blood Updated: 03/01/21 0711     Special Requests: NO SPECIAL REQUESTS        Culture result: NO GROWTH 3 DAYS       CULTURE, BLOOD [[id number]] Collected: 02/26/21 1915    Order Status: Completed Specimen: Blood Updated: 03/01/21 0711     Special Requests: NO SPECIAL REQUESTS        Culture result: NO GROWTH 3 DAYS       LEGIONELLA PNEUMOPHILA AG, URINE [[id number]] Collected: 02/27/21 0550    Order Status: Completed Specimen: Urine, random Updated: 02/27/21 1330     Legionella Ag, urine Negative       CULTURE, RESPIRATORY/SPUTUM/BRONCH [place] [employer] STAIN [[id number]]     Order Status: Sent Specimen: Sputum,ET Suction     MYCOPLASMA GENITALIUM, ANKITA, URINE [[id number]] Collected: 02/27/21 0550    Order Status: Completed Specimen: Urine Updated: 02/27/21 0609    COVID-19 RAPID TEST [[id number]] Collected: 02/26/21 1910    Order Status: Completed Specimen: Nasopharyngeal Updated: 02/26/21 1937     Specimen source Nasopharyngeal        COVID-19 rapid test Not detected        Comment: Rapid Abbott ID Now       Rapid NAAT:  The specimen is NEGATIVE for SARS-CoV-2, the novel coronavirus associated with COVID-19. Negative results should be treated as presumptive and, if inconsistent with clinical signs and symptoms or necessary for patient management, should be tested with an alternative molecular assay. Negative results do not preclude SARS-CoV-2 infection and should not be used as the sole basis for patient management decisions. This test has been authorized by the FDA under an Emergency Use Authorization (EUA) for use by authorized laboratories.    Fact sheet for Healthcare Providers: ConventionUpdate.co.nz  Fact sheet for Patients: ConventionUpdate.co.nz       Methodology: Isothermal Nucleic Acid Amplification               6/15/21 ECHO  · Image quality for this study was technically difficult. Contrast used: DEFINITY. · Normal cavity size, wall thickness and systolic function (ejection fraction normal). Estimated left ventricular ejection fraction is 60 - 65%. Mild (grade 1) left ventricular diastolic dysfunction E/E' ratio is 11. 16.  · Mildly dilated left atrium. · Mildly dilated right ventricle. · Mildly dilated right atrium. · Probably trileaflet aortic valve. · Mitral valve non-specific thickening. Mild mitral annular calcification. Trace mitral valve regurgitation is present. · Pulmonary arterial systolic pressure is 25 mmHg. Pulmonary hypertension not suggested by Doppler findings. · Severely elevated central venous pressure (15+ mmHg); IVC diameter is larger than 21 mm and collapses less than 50% with respiration. Imaging:  [x]I have personally reviewed the patients chest radiographs images and report     Results from Hospital Encounter encounter on 02/26/21   XR CHEST PORT    Narrative EXAM: XR CHEST PORT    CLINICAL INDICATION/HISTORY: Acute on chronic hypoxic hypercapnic respiratory  failure, COPD exacerbation, ET tube position    > Additional: None. COMPARISON: 2/26/2021    TECHNIQUE: Portable chest    _______________    FINDINGS:    SUPPORT DEVICES: The endotracheal tube terminates approximately 6.3 cm above the  juliana. Enteric tube courses inferolaterally below the field-of-view. HEART AND MEDIASTINUM: Normal size and contour. Normal pulmonary vasculature. LUNGS AND PLEURAL SPACES: Multifocal calcified pleural plaques are  redemonstrated with areas of scarring and atelectasis in the right lung base an  apical pleural thickening. No focal consolidation. The left lung is clear. BONY THORAX AND SOFT TISSUES: No acute osseous abnormality. _______________      Impression Support lines and tubes as above. Additional stable chronic findings as above. Results from [place] encounter on 05/29/20   CT LOW DOSE LUNG CANCER SCREENING    Narrative EXAM: CT Chest, lung cancer screening    CLINICAL INDICATION/HISTORY: Lung cancer screening, cigarette/nicotine  dependence. COMPARISON: CTA of the chest 10/31/2019. TECHNIQUE: Chest CT from thoracic inlet through the diaphragm without contrast.  A low-dose lung cancer screening protocol was performed. Note that visualization  of non-pulmonary soft tissue structures is limited with this protocol. Coronal  and sagittal reformats were generated and reviewed. One or more dose reduction  techniques were used on this CT: automated exposure control, adjustment of the  mAs and/or kVp according to patient size, and iterative reconstruction  techniques. The specific techniques used on this CT exam have been documented  in the patient's electronic medical record. Digital Imaging and Communications  in Medicine (DICOM) format image data are available to nonaffiliated external  healthcare facilities or entities on a secure, media free, reciprocally  searchable basis with patient authorization for at least a 12-month period after  this study. DLP: 67    _______________    FINDINGS:    LUNGS:    Nodules: No suspicious nodule. Other pulmonary findings:  Focally calcified area of soft tissue thickening along the right pleural margin  both anteriorly and posteriorly is unchanged in appearance since prior  examination. OTHER:     Coronary artery ossifications are present.     _______________        Impression IMPRESSION:    No suspicious pulmonary nodule. Focal areas of peripheral pleural thickening  with peripheral calcification is unchanged in appearance since prior  examinations. Lung-RADS 1 - Negative. Management: Continue annual screening with low dose CT in 12 months. Lung cancer screening categorization and recommendations per the [facility] of radiology Lung-RADS version 1.1. [x]See my orders for details    Edmar Head MD  3/1/2021

## 2021-03-01 NOTE — PROGRESS NOTES
0700 Bedside shift change report received from Melba Johns RN.  Report included the following information SBAR, Kardex, MAR, Recent Results and Cardiac Rhythm SR.

## 2021-03-01 NOTE — PROGRESS NOTES
Reason for Admission:   C/o SOB               RUR Score:     23    PCP: First and Last name: LIZETH Nelson   Name of Practice:   Are you a current patient: Yes/No:   Approximate date of last visit:    Can you do a virtual visit with your PCP:              Resources/supports as identified by patient/family:                   Top Challenges facing patient (as identified by patient/family and CM):  TBD                     Finances/Medication cost?     Pt has medicare and blue cross               Transportation? Support system or lack thereof?  family                     Living arrangements? Lives with spouse           Self-care/ADLs/Cognition?  independent          Current Advanced Directive/Advance Care Plan: On chart    Healthcare Decision Maker: spouse Click here to complete [address] including selection of the Healthcare Decision Maker Relationship (ie \"Primary\")                          Plan for utilizing home health:    TBD Transition of Care Plan: cart reviewed at this time transition of care not finalized, PT and OT has been ordered, cm will cont to monitor for recommendations, Hx includes COPD, prostate cancer,HTN,Brain aneurysm, cm did attempt to call spouse at  [phone] no answer, cm will cont to review chart for d/c planning. Care Management Interventions  PCP Verified by CM: Yes  Palliative Care Criteria Met (RRAT>21 & CHF Dx)?: No  Mode of Transport at Discharge:  Other (see comment)  Physical Therapy Consult: Yes  Occupational Therapy Consult: Yes  Speech Therapy Consult: Yes  Current Support Network: Lives with Spouse  Confirm Follow Up Transport: Family

## 2021-03-02 LAB
ANION GAP SERPL CALC-SCNC: 10 MMOL/L (ref 3–18)
BASOPHILS # BLD: 0 K/UL (ref 0–0.1)
BASOPHILS NFR BLD: 0 % (ref 0–2)
BUN SERPL-MCNC: 41 MG/DL (ref 7–18)
BUN/CREAT SERPL: 33 (ref 12–20)
CALCIUM SERPL-MCNC: 8.2 MG/DL (ref 8.5–10.1)
CHLORIDE SERPL-SCNC: 105 MMOL/L (ref 100–111)
CO2 SERPL-SCNC: 25 MMOL/L (ref 21–32)
CREAT SERPL-MCNC: 1.25 MG/DL (ref 0.6–1.3)
DIFFERENTIAL METHOD BLD: ABNORMAL
EOSINOPHIL # BLD: 0 K/UL (ref 0–0.4)
EOSINOPHIL NFR BLD: 0 % (ref 0–5)
ERYTHROCYTE [DISTWIDTH] IN BLOOD BY AUTOMATED COUNT: 13.6 % (ref 11.6–14.5)
GLUCOSE BLD STRIP.AUTO-MCNC: 119 MG/DL (ref 70–110)
GLUCOSE BLD STRIP.AUTO-MCNC: 124 MG/DL (ref 70–110)
GLUCOSE BLD STRIP.AUTO-MCNC: 126 MG/DL (ref 70–110)
GLUCOSE BLD STRIP.AUTO-MCNC: 140 MG/DL (ref 70–110)
GLUCOSE BLD STRIP.AUTO-MCNC: 158 MG/DL (ref 70–110)
GLUCOSE SERPL-MCNC: 143 MG/DL (ref 74–99)
HCT VFR BLD AUTO: 30 % (ref 36–48)
HGB BLD-MCNC: 9.8 G/DL (ref 13–16)
LYMPHOCYTES # BLD: 0.3 K/UL (ref 0.9–3.6)
LYMPHOCYTES NFR BLD: 5 % (ref 21–52)
MAGNESIUM SERPL-MCNC: 2.4 MG/DL (ref 1.6–2.6)
MCH RBC QN AUTO: 28.7 PG (ref 24–34)
MCHC RBC AUTO-ENTMCNC: 32.7 G/DL (ref 31–37)
MCV RBC AUTO: 87.7 FL (ref 74–97)
MONOCYTES # BLD: 0.3 K/UL (ref 0.05–1.2)
MONOCYTES NFR BLD: 5 % (ref 3–10)
NEUTS SEG # BLD: 5.1 K/UL (ref 1.8–8)
NEUTS SEG NFR BLD: 90 % (ref 40–73)
PHOSPHATE SERPL-MCNC: 3.4 MG/DL (ref 2.5–4.9)
PLATELET # BLD AUTO: 267 K/UL (ref 135–420)
PMV BLD AUTO: 8.6 FL (ref 9.2–11.8)
POTASSIUM SERPL-SCNC: 4 MMOL/L (ref 3.5–5.5)
RBC # BLD AUTO: 3.42 M/UL (ref 4.7–5.5)
SODIUM SERPL-SCNC: 140 MMOL/L (ref 136–145)
WBC # BLD AUTO: 5.6 K/UL (ref 4.6–13.2)

## 2021-03-02 PROCEDURE — 74011250637 HC RX REV CODE- 250/637: Performed by: HOSPITALIST

## 2021-03-02 PROCEDURE — 36415 COLL VENOUS BLD VENIPUNCTURE: CPT

## 2021-03-02 PROCEDURE — 82962 GLUCOSE BLOOD TEST: CPT

## 2021-03-02 PROCEDURE — 97535 SELF CARE MNGMENT TRAINING: CPT

## 2021-03-02 PROCEDURE — 74011000250 HC RX REV CODE- 250: Performed by: INTERNAL MEDICINE

## 2021-03-02 PROCEDURE — 74011636637 HC RX REV CODE- 636/637: Performed by: INTERNAL MEDICINE

## 2021-03-02 PROCEDURE — 97166 OT EVAL MOD COMPLEX 45 MIN: CPT

## 2021-03-02 PROCEDURE — C9113 INJ PANTOPRAZOLE SODIUM, VIA: HCPCS | Performed by: INTERNAL MEDICINE

## 2021-03-02 PROCEDURE — 85025 COMPLETE CBC W/AUTO DIFF WBC: CPT

## 2021-03-02 PROCEDURE — 97162 PT EVAL MOD COMPLEX 30 MIN: CPT

## 2021-03-02 PROCEDURE — 65660000000 HC RM CCU STEPDOWN

## 2021-03-02 PROCEDURE — 97116 GAIT TRAINING THERAPY: CPT

## 2021-03-02 PROCEDURE — 94760 N-INVAS EAR/PLS OXIMETRY 1: CPT

## 2021-03-02 PROCEDURE — 77010033678 HC OXYGEN DAILY

## 2021-03-02 PROCEDURE — 74011250636 HC RX REV CODE- 250/636: Performed by: INTERNAL MEDICINE

## 2021-03-02 PROCEDURE — 74011250637 HC RX REV CODE- 250/637: Performed by: INTERNAL MEDICINE

## 2021-03-02 PROCEDURE — 92526 ORAL FUNCTION THERAPY: CPT

## 2021-03-02 PROCEDURE — 83735 ASSAY OF MAGNESIUM: CPT

## 2021-03-02 PROCEDURE — 94640 AIRWAY INHALATION TREATMENT: CPT

## 2021-03-02 PROCEDURE — 84100 ASSAY OF PHOSPHORUS: CPT

## 2021-03-02 PROCEDURE — 74011250636 HC RX REV CODE- 250/636: Performed by: HOSPITALIST

## 2021-03-02 PROCEDURE — 80048 BASIC METABOLIC PNL TOTAL CA: CPT

## 2021-03-02 RX ORDER — PREDNISONE 20 MG/1
60 TABLET ORAL
Status: DISCONTINUED | OUTPATIENT
Start: 2021-03-02 | End: 2021-03-03 | Stop reason: HOSPADM

## 2021-03-02 RX ORDER — AMLODIPINE BESYLATE 5 MG/1
5 TABLET ORAL DAILY
Qty: 30 TAB | Refills: 0 | Status: SHIPPED | OUTPATIENT
Start: 2021-03-03 | End: 2022-04-15

## 2021-03-02 RX ORDER — PANTOPRAZOLE SODIUM 40 MG/1
40 TABLET, DELAYED RELEASE ORAL
Status: DISCONTINUED | OUTPATIENT
Start: 2021-03-03 | End: 2021-03-03 | Stop reason: HOSPADM

## 2021-03-02 RX ORDER — PREDNISONE 10 MG/1
TABLET ORAL
Qty: 21 TAB | Refills: 0 | Status: SHIPPED | OUTPATIENT
Start: 2021-03-02 | End: 2021-04-14

## 2021-03-02 RX ORDER — DOXYCYCLINE 100 MG/1
100 CAPSULE ORAL 2 TIMES DAILY
Qty: 15 CAP | Refills: 0 | Status: SHIPPED | OUTPATIENT
Start: 2021-03-02 | End: 2021-03-10

## 2021-03-02 RX ORDER — AZITHROMYCIN 250 MG/1
500 TABLET, FILM COATED ORAL EVERY 24 HOURS
Status: DISCONTINUED | OUTPATIENT
Start: 2021-03-02 | End: 2021-03-03 | Stop reason: HOSPADM

## 2021-03-02 RX ORDER — DOXYCYCLINE 100 MG/1
100 CAPSULE ORAL EVERY 12 HOURS
Status: DISCONTINUED | OUTPATIENT
Start: 2021-03-02 | End: 2021-03-03 | Stop reason: HOSPADM

## 2021-03-02 RX ADMIN — IPRATROPIUM BROMIDE AND ALBUTEROL SULFATE 3 ML: .5; 3 SOLUTION RESPIRATORY (INHALATION) at 15:51

## 2021-03-02 RX ADMIN — Medication 10 ML: at 13:30

## 2021-03-02 RX ADMIN — IPRATROPIUM BROMIDE AND ALBUTEROL SULFATE 3 ML: .5; 3 SOLUTION RESPIRATORY (INHALATION) at 11:26

## 2021-03-02 RX ADMIN — METHYLPREDNISOLONE SODIUM SUCCINATE 40 MG: 40 INJECTION, POWDER, FOR SOLUTION INTRAMUSCULAR; INTRAVENOUS at 13:30

## 2021-03-02 RX ADMIN — HEPARIN SODIUM 5000 UNITS: 5000 INJECTION INTRAVENOUS; SUBCUTANEOUS at 10:09

## 2021-03-02 RX ADMIN — METHYLPREDNISOLONE SODIUM SUCCINATE 40 MG: 40 INJECTION, POWDER, FOR SOLUTION INTRAMUSCULAR; INTRAVENOUS at 06:27

## 2021-03-02 RX ADMIN — PANTOPRAZOLE SODIUM 40 MG: 40 INJECTION, POWDER, FOR SOLUTION INTRAVENOUS at 08:29

## 2021-03-02 RX ADMIN — IPRATROPIUM BROMIDE AND ALBUTEROL SULFATE 3 ML: .5; 3 SOLUTION RESPIRATORY (INHALATION) at 19:49

## 2021-03-02 RX ADMIN — BUDESONIDE 500 MCG: 0.5 INHALANT RESPIRATORY (INHALATION) at 19:49

## 2021-03-02 RX ADMIN — TAMSULOSIN HYDROCHLORIDE 0.4 MG: 0.4 CAPSULE ORAL at 08:29

## 2021-03-02 RX ADMIN — HEPARIN SODIUM 5000 UNITS: 5000 INJECTION INTRAVENOUS; SUBCUTANEOUS at 17:15

## 2021-03-02 RX ADMIN — AMLODIPINE BESYLATE 5 MG: 5 TABLET ORAL at 08:29

## 2021-03-02 RX ADMIN — HEPARIN SODIUM 5000 UNITS: 5000 INJECTION INTRAVENOUS; SUBCUTANEOUS at 03:36

## 2021-03-02 RX ADMIN — PREDNISONE 60 MG: 20 TABLET ORAL at 22:37

## 2021-03-02 RX ADMIN — IPRATROPIUM BROMIDE AND ALBUTEROL SULFATE 3 ML: .5; 3 SOLUTION RESPIRATORY (INHALATION) at 07:12

## 2021-03-02 RX ADMIN — ACETAMINOPHEN 650 MG: 325 TABLET, FILM COATED ORAL at 08:29

## 2021-03-02 RX ADMIN — IPRATROPIUM BROMIDE AND ALBUTEROL SULFATE 3 ML: .5; 3 SOLUTION RESPIRATORY (INHALATION) at 00:07

## 2021-03-02 RX ADMIN — DOXYCYCLINE 100 MG: 100 CAPSULE ORAL at 22:37

## 2021-03-02 RX ADMIN — IPRATROPIUM BROMIDE AND ALBUTEROL SULFATE 3 ML: .5; 3 SOLUTION RESPIRATORY (INHALATION) at 03:19

## 2021-03-02 RX ADMIN — Medication 10 ML: at 06:28

## 2021-03-02 RX ADMIN — AZITHROMYCIN MONOHYDRATE 500 MG: 250 TABLET ORAL at 22:37

## 2021-03-02 RX ADMIN — BUDESONIDE 500 MCG: 0.5 INHALANT RESPIRATORY (INHALATION) at 07:12

## 2021-03-02 RX ADMIN — IPRATROPIUM BROMIDE AND ALBUTEROL SULFATE 3 ML: .5; 3 SOLUTION RESPIRATORY (INHALATION) at 23:34

## 2021-03-02 RX ADMIN — INSULIN LISPRO 3 UNITS: 100 INJECTION, SOLUTION INTRAVENOUS; SUBCUTANEOUS at 11:47

## 2021-03-02 NOTE — PROGRESS NOTES
Reason for Admission: Acute respiratory failure    Chart reviewed. Per H&P: \"Dre Domínguez is a 66 y.o.  male who history of COPD on home oxygen, hypertension, brain aneurysm, pneumonia comes into the emergency room with acute onset of shortness of breath wife states that he has been in his usual stuff state of health and recently became acutely short of breath he had been using his home nebulizers without any difficulties he was recently discharged about 3 weeks ago for COPD flare. \"               RUR Score: 34%         PCP: First and Last name: Dr. Arnoldo Ham   Name of Practice: [facility]   Are you a current patient: Yes/No: Yes   Approximate date of last visit: 2-3 months per patient   Can you do a virtual visit with your PCP:              Resources/supports as identified by patient/family: Lives with wife, good support system                  Top Challenges facing patient (as identified by patient/family and CM): Finances/Medication cost? Insurance covers                   Transportation? Wife provides              Support system or lack thereof? Good family and MD support per patient                     Living arrangements? Own home with wife               Self-care/ADLs/Cognition? Wife assists           Current Advanced Directive/Advance Care Plan: DNR (POST form on file)     Healthcare Decision Maker: Wife                          Plan for utilizing home health: TBD                    Transition of Care Plan: In progress, likely home with family assistance and MD follow-up    1206: Chart reviewed. CM spoke with the patient and informed him of the CM's role. The patient confirmed demographics and PCP. CM and the patient discussed discharge plans to include transportation upon discharge, DME, family support, and transportation to and from appointments.  The patient states that he lives at home with his wife and that she will be able to pick him up at discharge in addition to taking him to and from any future MD visits. The patient states that he currently wears 2L of oxygen at home and states that he was not active with a home health agency prior to this admission. The patient states that he has both a cane and walker and uses them both as needed at home. No immediate CM needs at this time, CM to follow the patient's progress and be available to assist as needed. CMS referral placed. 1555: CM met with the patient again at the bedside and discussed this readmission further. The patient states that even if home health services were indicated upon discharge he is not interested. CM also confirmed that the patient's POST form from August 2020 is up to date and current. MD made aware. Care Management Interventions  PCP Verified by CM: Yes  Palliative Care Criteria Met (RRAT>21 & CHF Dx)?: No  Mode of Transport at Discharge:  Other (see comment)(wife)  Transition of Care Consult (CM Consult): Discharge Planning  Physical Therapy Consult: Yes  Occupational Therapy Consult: Yes  Speech Therapy Consult: Yes  Current Support Network: Lives with Spouse  Confirm Follow Up Transport: Family  The Plan for Transition of Care is Related to the Following Treatment Goals : Acute respiratory failure  Discharge Location  Discharge Placement: Home with family assistance     Readmission Assessment  Number of days since last admission?: 8-30 days  Previous disposition: Home with Family  Who is being interviewed?: Patient  What was the patient's/caregiver's perception as to why they think they needed to return back to the hospital?: Other (Comment)(Patient states \"I don't know, I felt great when I left last time\")  Did you visit your Primary Care Physician after you left the hospital, before you returned this time?: No  Did you see a specialist, such as Cardiac, Pulmonary, Orthopedic Physician, etc. after you left the hospital?: No  Who advised the patient to return to the hospital?: Self-referral  Does the patient report anything that got in the way of taking their medications?: No  In our efforts to provide the best possible care to you and others like you, can you think of anything that we could have done to help you after you left the hospital the first time, so that you might not have needed to return so soon?: Other (Comment)(Patient states \"No, I don't think so\")

## 2021-03-02 NOTE — PROGRESS NOTES
Problem: Pressure Injury - Risk of  Goal: *Prevention of pressure injury  Description: Document Nikos Scale and appropriate interventions in the flowsheet. Outcome: Progressing Towards Goal  Note: Pressure Injury Interventions:  Sensory Interventions: Assess changes in LOC    Moisture Interventions: Absorbent underpads    Activity Interventions: Increase time out of bed, Pressure redistribution bed/mattress(bed type)    Mobility Interventions: HOB 30 degrees or less, Pressure redistribution bed/mattress (bed type)    Nutrition Interventions: Document food/fluid/supplement intake    Friction and Shear Interventions: Apply protective barrier, creams and emollients                Problem: Patient Education: Go to Patient Education Activity  Goal: Patient/Family Education  Outcome: Progressing Towards Goal     Problem: Falls - Risk of  Goal: *Absence of Falls  Description: Document Darell Fall Risk and appropriate interventions in the flowsheet.   Outcome: Progressing Towards Goal  Note: Fall Risk Interventions:  Mobility Interventions: Bed/chair exit alarm    Mentation Interventions: Adequate sleep, hydration, pain control    Medication Interventions: Bed/chair exit alarm    Elimination Interventions: Call light in reach              Problem: Patient Education: Go to Patient Education Activity  Goal: Patient/Family Education  Outcome: Progressing Towards Goal

## 2021-03-02 NOTE — PROGRESS NOTES
Hospitalist Progress Note    Patient: June Astudillo MRN: [id number]  CSN: [id number]    YOB: 1945  Age: 66 y.o. Sex: male    DOA: 2/26/2021 LOS:  LOS: 4 days          Chief Complaint:     Ac resp failure      Assessment/Plan     Acute on chronic hypercapnic hypoxic respiratory failure -extubated 2/28, covid negative, pneumonia negative, DC antibiotics, on home oxygen    COPD exacerbation -improved  discharge patient home on PO prednisone and taper over next 7 days  Discharge patient home on doxycycline to complete total 10-day antibiotic course  Continue outpatient regimen of Pulmicort nebs twice a day, Brovana nebs twice a day, DuoNeb and albuterol p.o. as needed     Dysphagia, ST assessment, soft solids, thin liquids, aspiration precautions    Tobacco use, advised permanent cessation    HTN, continue norvasc    PAF, no acute issues    Per case management note, patient refusing home health services even though PT has recommended them    Discharge home today, discussed with pulmonologist, Dr. Edmar Head    Disposition : Home   Patient Active Problem List   Diagnosis Code    Hypertension I10    COPD (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 (HCC) J44.9    Chronic renal disease, stage 3, moderately decreased glomerular filtration rate between 30-59 mL/min/1.73 square meter N18.30    Asbestosis ([facility] [address] J61    Acute exacerbation of chronic obstructive pulmonary disease (COPD) ([address] J44.1    Debility R53.81    Paroxysmal atrial fibrillation (HCC) I48.0    Chronic respiratory failure with hypoxia ([facility] [address] J96.11    Tobacco dependence F17.200    Hyponatremia E87.1    Pleural effusion, right J90    COPD with acute exacerbation (HCC) J44.1    Acute respiratory failure with hypoxia and hypercarbia (HCC) J96.01, J96.02    Hyponatremia E87.1    Advanced care planning/counseling discussion Z71.89    Hypokalemia E87.6    COPD (chronic obstructive pulmonary disease) ([facility] [address] J44.9    CAP (community acquired pneumonia) J18.9    Respiratory distress R06.03    COPD exacerbation ([facility]) J44.1    Atrial fibrillation with RVR ([facility]) I48.91    Acute respiratory failure ([facility]) J96.00       Subjective:    Pleased that he can go home. Feels much better. Review of systems:    Constitutional: denies fevers  Respiratory: denies SOB, cough  Cardiovascular: denies chest pain  Gastrointestinal: denies nausea, vomiting, diarrhea      Vital signs/Intake and Output:  Visit Vitals  BP (!) 152/61 (BP 1 Location: Left upper arm)   Pulse 79   Temp 97.2 °F (36.2 °C)   Resp 19   Ht 5' 8\" (1.727 m)   Wt 94.2 kg (207 lb 10.8 oz)   SpO2 97%   BMI 31.58 kg/m²     Current Shift:  03/02 0701 - 03/02 1900  In: 240 [P.O.:240]  Out: 300 [Urine:300]  Last three shifts:  02/28 1901 - 03/02 0700  In: 840 [P.O.:840]  Out: 2717 [Urine:1830]    Exam:    General: elderly obese Wm, NAD, alert and talking  CVS:Regular rate and rhythm, no M/R/G, S1/S2 heard, no thrill  Lungs:Clear to auscultation bilaterally, no wheezes, rhonchi, or rales  Abdomen: Soft, Nontender, No distention, Normal Bowel sounds, No hepatomegaly  Extremities: No C/C/E, pulses palpable 2+  Neuro:grossly normal , follows commands  Psych:appropriate                Labs: Results:       Chemistry Recent Labs     03/02/21 0142 03/01/21  0510 02/28/21  0445   * 126* 139*    138 137   K 4.0 3.9 4.2    106 102   CO2 25 25 23   BUN 41* 38* 24*   CREA 1.25 1.23 1.42*   CA 8.2* 8.3* 8.5   AGAP 10 7 12   BUCR 33* 31* 17   AP  --   --  112   TP  --   --  6.1*   ALB  --   --  2.7*   GLOB  --   --  3.4   AGRAT  --   --  0.8      CBC w/Diff Recent Labs     03/02/21 0142 03/01/21  0510 02/28/21  0445   WBC 5.6 6.7 5.7   RBC 3.42* 3.56* 3.88*   HGB 9.8* 10.1* 10.7*   HCT 30.0* 31.2* 33.6*    268 296   GRANS 90* 88* 87*   LYMPH 5* 7* 11*   EOS 0 0 0      Cardiac Enzymes No results for input(s): CPK, CKND1, WILLARD in the last 72 hours.     No lab exists for component: CKRMB, TROIP   Coagulation No results for input(s): PTP, INR, APTT, INREXT, INREXT in the last 72 hours. Lipid Panel No results found for: CHOL, CHOLPOCT, CHOLX, CHLST, CHOLV, 490866, HDL, HDLP, LDL, LDLC, DLDLP, 035188, VLDLC, VLDL, TGLX, TRIGL, TRIGP, TGLPOCT, CHHD, CHHDX   BNP No results for input(s): BNPP in the last 72 hours.    Liver Enzymes Recent Labs     02/28/21  0445   TP 6.1*   ALB 2.7*         Thyroid Studies Lab Results   Component Value Date/Time    TSH 0.86 10/20/2019 01:05 AM        Procedures/imaging: see electronic medical records for all procedures/Xrays and details which were not copied into this note but were reviewed prior to creation of Ulices Sim MD

## 2021-03-02 NOTE — PROGRESS NOTES
Problem: Dysphagia (Adult)  Goal: *Acute Goals and Plan of Care (Insert Text)  Description: Recommendations:  Diet: Soft solids/thin liquid   Meds: Per patient preference  Aspiration Precautions  Oral Care TID  Other: Slow rate, small bites/sips    Goals:  Patient will:  1. Tolerate PO trials with 0 s/s overt distress in 4/5 trials  2. Utilize compensatory swallow strategies/maneuvers (decrease bite/sip, size/rate, alt. liq/sol) with min cues in 4/5 trials  3. Perform oral-motor/laryngeal exercises to increase oropharyngeal swallow function with min cues  4. Complete an objective swallow study (i.e., MBSS) to assess swallow integrity, r/o aspiration, and determine of safest LRD, min A  3/2/2021 1401 by Nesha Siddiqui SLP  Outcome: Progressing Towards Goal    SPEECH LANGUAGE PATHOLOGY DYSPHAGIA TREATMENT    Patient: Livan Stark (19 y.o. male)  Date: 3/2/2021  Diagnosis: Acute respiratory failure ([facility] [address] [J96.00] Acute respiratory failure (HCC)       Precautions: Aspiration Fall  PLOF: Independent     ASSESSMENT:  Followed up with dysphagia intervention. A&Ox4. Patient observed self-feeding thin liquid + straw, puree and solid trials. Pt exhibited mildly delayed mastication of solids with otherwise adequate bolus cohesion, manipulation and A-P transit. Further exhibited adequate swallow timing/reflex and hyolaryngeal excursion. 0 overt s/sx of aspiration across all consistencies. Recommend soft solid, thin liquid diet with aspiration precautions. Educated pt on aspiration precautions and importance of compensatory swallow techniques to decrease aspiration risk (decrease rate of intake & sip/bite size, upright @HOB for all po intake and ~30 minutes after po); verbalized comprehension. SLP will follow up 1-2x to assess diet tolerance and education.      Progression toward goals:  []         Improving appropriately and progressing toward goals  [x]         Improving slowly and progressing toward goals  []         Not making progress toward goals and plan of care will be adjusted     PLAN:  Recommendations and Planned Interventions:  Soft solids/thin liquid   Patient continues to benefit from skilled intervention to address the above impairments. Continue treatment per established plan of care. Discharge Recommendations: To Be Determined     SUBJECTIVE:   Patient stated I'm going home around 3:15. OBJECTIVE:   Cognitive and Communication Status:  Neurologic State: Alert  Orientation Level: Oriented X4  Cognition: Follows commands  Perception: Appears intact  Perseveration: No perseveration noted  Safety/Judgement: Awareness of environment  Dysphagia Treatment:  Oral Assessment:  Oral Assessment  Labial: No impairment  Dentition: Natural, Extractions  Oral Hygiene: Good  Lingual: No impairment  Velum: No impairment  Mandible: No impairment  P.O. Trials:   Patient Position: HOB 60   Vocal quality prior to P.O.: No impairment   Consistency Presented: Thin liquid, Puree, Solid   How Presented: Self-fed/presented, Straw, Successive swallows       Bolus Acceptance: No impairment   Bolus Formation/Control: Impaired   Type of Impairment: Delayed, Mastication   Propulsion: No impairment   Oral Residue: None   Initiation of Swallow: No impairment   Laryngeal Elevation: Functional   Aspiration Signs/Symptoms: None   Pharyngeal Phase Characteristics: Effortful swallow   Effective Modifications:  Alternate liquids/solids, Small sips and bites   Cues for Modifications: Minimal         Oral Phase Severity: Mild   Pharyngeal Phase Severity : Mild    PAIN:  Pain level pre-treatment: 6/10   Pain level post-treatment: 6/10     After treatment:   []              Patient left in no apparent distress sitting up in chair  [x]              Patient left in no apparent distress in bed  [x]              Call bell left within reach  []              Nursing notified  []              Family present  []              Caregiver present  []              Bed alarm activated      COMMUNICATION/EDUCATION:   [x] Aspiration precautions; swallow safety; compensatory techniques  []        Patient unable to participate in education; education ongoing with staff  []  Posted safety precautions in patient's room.   [] Oral-motor/laryngeal strengthening exercises      FADI Zaragoza  Time Calculation: 9 mins

## 2021-03-02 NOTE — PROGRESS NOTES
IV to PO Conversion Recommendation     Patient: Chantel Bonilla   MRN#: [id number]   Admission Date: 695825     IV TO PO  Medication(s) Azithromycin and Pantoprazole   Oral Meds  Yes   Diet:     Active Orders   Diet    DIET DENTAL SOFT (SOFT SOLID)      TEMP 97.2 °F (36.2 °C)   WBC Lab Results   Component Value Date/Time    WBC 5.6 03/02/2021 01:42 AM           Pharmacy Dosing Services:  Summary:   Does the patient meet all criteria for IV to PO switch as listed below? Yes   If no, list:    Is the patient excluded from IV to PO automatic switch based on criteria listed below? No   If yes, list:      Criteria for IV to PO switch - Antibiotics   Received IV therapy for at least 24 hours   2. Functioning GI tract   Taking scheduled oral medications  Tolerating diet more advanced than clear liquids   3. No signs/symptoms of shock  No vasopressor support    Criteria for IV to PO switch - Nonantibiotics   Functioning GI tract   Taking scheduled oral medications  Toleratind duet more advanced than clear liquids  2. No signs/symptoms of shock  No vasopressor support        3. No seizures for >72 hours (antiepileptic medications only)    Exclusion Criteria   Patient is being treated for an infection that requires IV therapy such as: endocarditis, osteomyelitis, meningitis, pancreatitis (antibiotics only)   NG tube with continous suction   Nausea and/or vomiting or severe diarrhea within past 24 hours  Malabsorption syndromes, partial or total gastrectomy, ileus, gastric outlet or bowel obstruction  Active GI bleeding   Significant painful oral ulceration  Unable to swallow  On total parenteral nutrition with a NPO order  NPO  Febrile in last 24 hours (antibiotics only)  Clinically deteriorating or unstable (antibiotics only)    This IV to PO conversion is based on the [facility]  [employer] approved automatic conversion policy for eligible patients. Please call with questions.     ERICH Turner  Clinical Pharmacist  [phone]

## 2021-03-02 NOTE — PROGRESS NOTES
Comprehensive Nutrition Assessment    Type and Reason for Visit: Reassess, NPO/clear liquid    Nutrition Recommendations/Plan: Other: continue w/ POC    Nutrition Assessment:  pt admitted w/ Acute on chronic hypercapnic hypoxic respiratory failure, COPD exacerbation, extubated, SLP following for dysphagia        Estimated Daily Nutrient Needs:  Energy (kcal): (P) 2100; Weight Used for Energy Requirements: (P) Ideal  Protein (g): (P) ; Weight Used for Protein Requirements: (P) Ideal  Fluid (ml/day): (P) 2100; Method Used for Fluid Requirements: (P) 1 ml/kcal      Nutrition Related Findings:  (P) Meds: humalog, methylpredisolone, protonix, zofran, miralax labs reviewed; pt reports eating well       Wounds:    Pressure injury, Wound consult pending(per nurse documenation awaiting wound care note)       Current Nutrition Therapies:  DIET DENTAL SOFT (SOFT SOLID)    Anthropometric Measures:  · Height:  5' 8\" (172.7 cm)  · Current Body Wt:  93.9 kg (207 lb)   · Admission Body Wt:  202 lb    · Usual Body Wt:  87.1 kg (192 lb)     · Ideal Body Wt:  154 lbs:  134.4 %   · Adjusted Body Weight:   ; Weight Adjustment for:     · Adjusted BMI:       · BMI Category:  Obese class 1 (BMI 30.0-34. 9)       Nutrition Diagnosis:   · (P) Inadequate oral intake related to (P) biting/chewing (masticatory) difficulty, swallowing difficulty as evidenced by (P) other (specify)(need for modify diet per SLP)    Nutrition Interventions:   Food and/or Nutrient Delivery: (P) Continue current diet  Nutrition Education and Counseling: (P) No recommendations at this time  Coordination of Nutrition Care: (P) Continue to monitor while inpatient, Interdisciplinary rounds    Goals:  (P) continue to encourage PO intake >50% in the next 5-7days       Nutrition Monitoring and Evaluation:   Behavioral-Environmental Outcomes:    Food/Nutrient Intake Outcomes: (P) Diet advancement/tolerance, Food and nutrient intake  Physical Signs/Symptoms Outcomes: (P) Biochemical data, Skin, Weight, GI status    Discharge Planning:    (P) Continue current diet     Electronically signed by Hakan Dyson on 3/2/2021 at 8:52 AM

## 2021-03-02 NOTE — DISCHARGE SUMMARY
Discharge Summary    Patient: Aurora Luis MRN: [id number]  CSN: [id number]    YOB: 1945  Age: 66 y.o.   Sex: male    DOA: 2/26/2021 LOS:  LOS: 4 days   Discharge Date:      Primary Care Provider:  Jr Augustin MD    Admission Diagnoses: Acute respiratory failure ([place] [address] [J96.00]    Discharge Diagnoses:    Problem List as of 3/2/2021 Date Reviewed: 2/27/2021          Codes Class Noted - Resolved    * (Principal) Acute respiratory failure ([place] [address] ICD-10-CM: J96.00  ICD-9-CM: 518.81  2/26/2021 - Present        Respiratory distress ICD-10-CM: R06.03  ICD-9-CM: 786.09  2/2/2021 - Present        COPD exacerbation ([place] [address] ICD-10-CM: J44.1  ICD-9-CM: 491.21  2/2/2021 - Present        Atrial fibrillation with RVR ([place] [address] ICD-10-CM: I48.91  ICD-9-CM: 427.31  2/2/2021 - Present        COPD (chronic obstructive pulmonary disease) ([place] [address] ICD-10-CM: J44.9  ICD-9-CM: 918  8/5/2020 - Present        CAP (community acquired pneumonia) ICD-10-CM: J18.9  ICD-9-CM: 486  8/5/2020 - Present        Advanced care planning/counseling discussion ICD-10-CM: Z71.89  ICD-9-CM: V65.49  Unknown - Present        Hypokalemia ICD-10-CM: E87.6  ICD-9-CM: 276.8  4/14/2020 - Present        Hyponatremia ICD-10-CM: E87.1  ICD-9-CM: 276.1  4/10/2020 - Present        COPD with acute exacerbation ([place] [address] ICD-10-CM: J44.1  ICD-9-CM: 491.21  4/9/2020 - Present        Acute respiratory failure with hypoxia and hypercarbia ([place] [address] ICD-10-CM: J96.01, J96.02  ICD-9-CM: 518.81  4/9/2020 - Present        Pleural effusion, right ICD-10-CM: J90  ICD-9-CM: 511.9  2/22/2020 - Present        Tobacco dependence ICD-10-CM: F17.200  ICD-9-CM: 305.1  2/21/2020 - Present        Hyponatremia ICD-10-CM: E87.1  ICD-9-CM: 276.1  2/21/2020 - Present        Paroxysmal atrial fibrillation ([facility] ICD-10-CM: I48.0  ICD-9-CM: 427.31  8/19/2019 - Present        Chronic respiratory failure with hypoxia ([facility] ICD-10-CM: J96.11  ICD-9-CM: 518.83, 799.02  8/19/2019 - Present Debility ICD-10-CM: R53.81  ICD-9-CM: 799.3  7/8/2019 - Present        Acute exacerbation of chronic obstructive pulmonary disease (COPD) ([facility] ICD-10-CM: J44.1  ICD-9-CM: 491.21  2/8/2019 - Present        Asbestosis ([facility] ICD-10-CM: E41  ICD-9-CM: 029  10/19/2018 - Present        Chronic renal disease, stage 3, moderately decreased glomerular filtration rate between 30-59 mL/min/1.73 square meter ICD-10-CM: N18.30  ICD-9-CM: 585.3  7/20/2018 - Present        COPD (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 (HCC) ICD-10-CM: J44.9  ICD-9-CM: 491.20  4/20/2018 - Present        Hypertension ICD-10-CM: I10  ICD-9-CM: 401.9  Unknown - Present        RESOLVED: Acute on chronic respiratory failure with hypoxia and hypercapnia (HCC) ICD-10-CM: J96.21, J96.22  ICD-9-CM: 518.84, 786.09, 799.02  10/30/2019 - 2/25/2020        RESOLVED: COPD with asthma and status asthmaticus ([facility] ICD-10-CM: J44.9, J45.902  ICD-9-CM: 493.21  7/20/2018 - 2/8/2019        RESOLVED: Status asthmaticus with COPD (chronic obstructive pulmonary disease) ([facility] ICD-10-CM: J44.9, J45.902  ICD-9-CM: 493.21  4/20/2018 - 2/8/2019        RESOLVED: PVC's (premature ventricular contractions) ICD-10-CM: I49.3  ICD-9-CM: 427.69  4/20/2018 - 2/8/2019        RESOLVED: Acute respiratory failure ([facility] ICD-10-CM: J96.00  ICD-9-CM: 518.81  10/2/2014 - 3/18/2017        RESOLVED: URIEL (acute kidney injury) ([facility] ICD-10-CM: N17.9  ICD-9-CM: 584.9  10/2/2014 - 2/8/2019        RESOLVED: Pneumonia ICD-10-CM: J18.9  ICD-9-CM: 486  10/2/2014 - 3/18/2017              Discharge Medications:     Current Discharge Medication List      START taking these medications    Details   amLODIPine (NORVASC) 5 mg tablet Take 1 Tab by mouth daily. Qty: 30 Tab, Refills: 0         CONTINUE these medications which have CHANGED    Details   doxycycline (MONODOX) 100 mg capsule Take 1 Cap by mouth two (2) times a day for 15 doses.   Qty: 15 Cap, Refills: 0      predniSONE (STERAPRED DS) 10 mg dose pack Take 6 tablets p.o. daily x1 day, take 5 tablets p.o. daily x1 day, take 4 tablets p.o. daily x1 day, take 3 tabs p.o. daily x1 day, take 2 tablets p.o. daily x1 day, and take 1 tablet p.o. daily x1 day. Qty: 21 Tab, Refills: 0         CONTINUE these medications which have NOT CHANGED    Details   budesonide (PULMICORT) 0.5 mg/2 mL nbsp 2 mL by Nebulization route two (2) times a day. Qty: 60 mL, Refills: 0      albuterol-ipratropium (DUO-NEB) 2.5 mg-0.5 mg/3 ml nebu 3 mL by Nebulization route every four (4) hours as needed for Wheezing. Qty: 30 Nebule, Refills: 0      albuterol (PROVENTIL HFA, VENTOLIN HFA, PROAIR HFA) 90 mcg/actuation inhaler Take 2 Puffs by inhalation every four (4) hours as needed for Wheezing or Shortness of Breath. Qty: 1 Inhaler, Refills: 1      arformoteroL (BROVANA) 15 mcg/2 mL nebu neb solution 2 mL by Nebulization route two (2) times a day. Qty: 60 Vial, Refills: 0      OXYGEN-AIR DELIVERY SYSTEMS 2 L/min by Nasal route continuous. furosemide (Lasix) 20 mg tablet Take 20 mg by mouth daily. dilTIAZem (CARDIZEM) 30 mg tablet Take 1 Tab by mouth Before breakfast, lunch, and dinner. Qty: 90 Tab, Refills: 0      acetaminophen (TYLENOL) 325 mg tablet Take 650 mg by mouth every eight (8) hours as needed for Pain. dextran 70/hypromellose (ARTIFICIAL TEARS, PF, OP) Apply 2 Drops to eye as needed for Other (both eyes for dryness). diphenhydrAMINE (BENADRYL) 25 mg capsule Take 25 mg by mouth nightly as needed for Itching. tamsulosin (FLOMAX) 0.4 mg capsule Take 0.4 mg by mouth every evening.              Discharge Condition: Improved    Procedures : None    Consults: Pulmonology      PHYSICAL EXAM   Visit Vitals  BP (!) 153/66 (BP 1 Location: Left upper arm, BP Patient Position: At rest)   Pulse 77   Temp 97.7 °F (36.5 °C)   Resp 19   Ht 5' 8\" (1.727 m)   Wt 94.2 kg (207 lb 10.8 oz)   SpO2 99%   BMI 31.58 kg/m²     General: Awake, cooperative, no acute distress    HEENT: NC, Atraumatic. PERRLA, EOMI. Anicteric sclerae. Lungs:  CTA Bilaterally. No Wheezing/Rhonchi/Rales. Heart:  Regular  rhythm,  No murmur, No Rubs, No Gallops  Abdomen: Soft, Non distended, Non tender. +Bowel sounds,   Extremities: No c/c/e  Psych:   Not anxious or agitated. Neurologic:  No acute neurological deficits. Admission HPI : Sb Valdes is a 66 y.o.  male who history of COPD on home oxygen, hypertension, brain aneurysm, pneumonia comes into the emergency room with acute onset of shortness of breath wife states that he has been in his usual stuff state of health and recently became acutely short of breath he had been using his home nebulizers without any difficulties he was recently discharged about 3 weeks ago for COPD flare    Hospital Course : Acute on chronic hypercapnic hypoxic respiratory failure -extubated 2/28, covid negative, pneumonia negative, DC antibiotics, on home oxygen     COPD exacerbation -improved  discharge patient home on PO prednisone from [facility] IV and taper over next 7 days  Discharge patient home on doxycycline to complete total 10-day antibiotic course, was on IV Rocephin and azithromycin while inpatient  Continue outpatient regimen of Pulmicort nebs twice a day, Brovana nebs twice a day, DuoNeb and albuterol p.o. as needed      Dysphagia, ST assessment, soft solids, thin liquids, aspiration precautions     Tobacco use, advised permanent cessation     HTN, continue norvasc     PAF, no acute issues     Per case management note, patient refusing home health services even though PT has recommended them       Activity: As tolerated    Diet: Has history of dysphagia, diet needs to be soft solids, thin liquids with for aspiration precautions. Patient was educated on aspiration precautions by speech therapy while inpatient    Follow-up:  With Dr. Minnie Correia in 1 week, pulmonology    Disposition: Home, refusing home health services even though recommended by PT    Minutes spent on discharge: 40 minutes      Labs: Results:       Chemistry Recent Labs     03/02/21 0142 03/01/21 0510 02/28/21 0445   * 126* 139*    138 137   K 4.0 3.9 4.2    106 102   CO2 25 25 23   BUN 41* 38* 24*   CREA 1.25 1.23 1.42*   CA 8.2* 8.3* 8.5   AGAP 10 7 12   BUCR 33* 31* 17   AP  --   --  112   TP  --   --  6.1*   ALB  --   --  2.7*   GLOB  --   --  3.4   AGRAT  --   --  0.8      CBC w/Diff Recent Labs     03/02/21 0142 03/01/21 0510 02/28/21 0445   WBC 5.6 6.7 5.7   RBC 3.42* 3.56* 3.88*   HGB 9.8* 10.1* 10.7*   HCT 30.0* 31.2* 33.6*    268 296   GRANS 90* 88* 87*   LYMPH 5* 7* 11*   EOS 0 0 0      Cardiac Enzymes No results for input(s): CPK, CKND1, WILLARD in the last 72 hours. No lab exists for component: CKRMB, TROIP   Coagulation No results for input(s): PTP, INR, APTT, INREXT, INREXT in the last 72 hours. Lipid Panel No results found for: CHOL, CHOLPOCT, CHOLX, CHLST, CHOLV, 415953, HDL, HDLP, LDL, LDLC, DLDLP, 248204, VLDLC, VLDL, TGLX, TRIGL, TRIGP, TGLPOCT, CHHD, CHHDX   BNP No results for input(s): BNPP in the last 72 hours. Liver Enzymes Recent Labs     02/28/21 0445   TP 6.1*   ALB 2.7*         Thyroid Studies Lab Results   Component Value Date/Time    TSH 0.86 10/20/2019 01:05 AM            Significant Diagnostic Studies: Xr Abd (kub)    Result Date: 2/26/2021  History: Tube placement. COMPARISON: 7/7/2019 Single portable view supine of the abdomen performed. Visualized bowel gas appears unremarkable. There is a nasogastric-type tube with its tip and sidehole in the region of the distal stomach. NG tube in the distal stomach. Xr Chest Port    Result Date: 2/28/2021  EXAM: XR CHEST PORT CLINICAL INDICATION/HISTORY: Acute on chronic hypoxic hypercapnic respiratory failure, COPD exacerbation, ET tube position   > Additional: None.  COMPARISON: 2/26/2021 TECHNIQUE: Portable chest _______________ FINDINGS: SUPPORT DEVICES: The endotracheal tube terminates approximately 6.3 cm above the juliana. Enteric tube courses inferolaterally below the field-of-view. HEART AND MEDIASTINUM: Normal size and contour. Normal pulmonary vasculature. LUNGS AND PLEURAL SPACES: Multifocal calcified pleural plaques are redemonstrated with areas of scarring and atelectasis in the right lung base an apical pleural thickening. No focal consolidation. The left lung is clear. BONY THORAX AND SOFT TISSUES: No acute osseous abnormality. _______________     Support lines and tubes as above. Additional stable chronic findings as above. Xr Chest Port    Result Date: 2/26/2021  EXAM: CHEST RADIOGRAPH CLINICAL INDICATION/HISTORY: ACUTE RESP DISTRESS -Additional: None COMPARISON: 2/2/2021 TECHNIQUE: Portable frontal view of the chest _______________ FINDINGS: SUPPORT DEVICES: Endotracheal tube 6.8 cm above the juliana. HEART AND MEDIASTINUM: No appreciable cardiomegaly. Remaining mediastinal contours within normal limits. LUNGS AND PLEURAL SPACES: The calcified pleural plaque remains. Parenchymal scarring is seen in the upper and lower lobe on the right about the same. Left lung appears clear. No effusion. No pneumothorax. BONY THORAX AND SOFT TISSUES: Unremarkable. _______________     1. No significant change other than Saqib of endotracheal tube, which appears in satisfactory position. Xr Chest Port    Result Date: 2/2/2021  EXAM: XR CHEST PORT CLINICAL INDICATION/HISTORY: sob, copd and CHF history -Additional: None COMPARISON: 12/29/2020 TECHNIQUE: Portable frontal view of the chest _______________ FINDINGS: SUPPORT DEVICES: None. HEART AND MEDIASTINUM: Cardiomediastinal silhouette within normal limits. LUNGS AND PLEURAL SPACES: Right pleural calcified plaque. Superimposed right suprahilar and right lung base interstitial/parenchymal opacities. Left lung is clear. No large effusion or pneumothorax. _______________     Chronic appearing right suprahilar and right lung base interstitial/parenchymal opacities, cannot exclude superimposed acute infiltrate. Echo Adult Complete    Result Date: 2/27/2021  · Image quality for this study was suboptimal. · Contrast used: DEFINITY. · Left Ventricle: Normal cavity size, wall thickness and systolic function (ejection fraction normal). The estimated EF is 50 - 55%. There is mild (grade 1) left ventricular diastolic dysfunction E'E= 10.05. Poor resolution of endocardial border. Can not comment on wall motion abnormality · Tricuspid Valve: Tricuspid valve not well visualized. No stenosis. Tricuspid regurgitation is inadequate for estimation of right ventricular systolic pressure. No results found for this or any previous visit.         CC: Briana Chavez MD

## 2021-03-02 NOTE — PROGRESS NOTES
Problem: Self Care Deficits Care Plan (Adult)  Goal: *Acute Goals and Plan of Care (Insert Text)  Description: Initial Occupational Therapy Goals (3/2/2021) Within 7 day(s):    1. Patient will perform grooming standing at sink with supervision x 8 minutes for increased independence with ADLs. 2. Patient will perform UB dressing with mod I for increased independence with ADLs. 3. Patient will perform LB dressing with SBA & A/E PRN for increased independence with ADLs. 4. Patient will perform all aspects of toileting with supervision for increased independence in ADLs  5. Patient will independently apply energy conservation techniques with 1 verbal cue(s) for increased independence with ADLs. 6. Patient will utilize good body mechanics during ADLs with 1 verbal cue(s). 7. Patient will independently manage O2 tubing to safely ambulate to/from bathroom. Outcome: Progressing Towards Goal   OCCUPATIONAL THERAPY EVALUATION    Patient: Chris Cardoso (69 y.o. male)  Date: 3/2/2021  Primary Diagnosis: Acute respiratory failure ([facility] [J96.00]        Precautions: Fall  PLOF: pt ambulates with SPC/RW at home and for community mobility, pt lives with spouse who assists with lower body dressing, pt owns hip kit at home, uses 2L O2 at home    ASSESSMENT :  Based on the objective data described below, the patient presents with decreased endurance, strength, ROM limiting independence with ADLs. Pt found supine in bed, reporting pain 6/10 in R hip and R knee which is chronic for pt. Pt supervision for med mobility and to sit up to EOB. Pt required total A for sock donning, pt not able to perform legs crossed or c sitting position. Pt reports he wears slip on shoes at home, or has spouse assist with sock/shoe donning. Pt CGA for STS with additional time to complete, ambulated to bathroom with RW and CGA. Pt ambulated back to EOB, pt experiencing KUMAR.  Encouraged pt to be OOB for meals, and to walk to the bathroom with assist instead of using urinal. Pt left seated EOB, call bell/phone within reach, instructed pt to call for help before getting out of bed. Education: role of OT in acute care, Energy Conservation/Work Simplification Techniques, fall prevention    Patient will benefit from skilled intervention to address the above impairments. Patient's rehabilitation potential is considered to be Fair  Factors which may influence rehabilitation potential include:   []             None noted  []             Mental ability/status  [x]             Medical condition  []             Home/family situation and support systems  []             Safety awareness  []             Pain tolerance/management  []             Other:      PLAN :  Recommendations and Planned Interventions:   [x]               Self Care Training                  [x]      Therapeutic Activities  [x]               Functional Mobility Training   []      Cognitive Retraining  [x]               Therapeutic Exercises           [x]      Endurance Activities  []               Balance Training                    []      Neuromuscular Re-Education  []               Visual/Perceptual Training     [x]      Home Safety Training  [x]               Patient Education                   [x]      Family Training/Education  []               Other (comment):    Frequency/Duration: Patient will be followed by occupational therapy 1-2 times per day/4-7 days per week to address goals.   Discharge Recommendations: Home Health  Further Equipment Recommendations for Discharge: N/A     SUBJECTIVE:   Patient stated my wife puts my socks on.    OBJECTIVE DATA SUMMARY:     Past Medical History:   Diagnosis Date    Brain aneurysm     Cancer ([facility]     prostate    COPD (chronic obstructive pulmonary disease) ([facility]     Hypertension     Nocturia     Pneumonia     Radiation effect      Past Surgical History:   Procedure Laterality Date    HX HERNIA REPAIR       Barriers to Learning/Limitations: yes; physical  Compensate with: visual, verbal, tactile, kinesthetic cues/model    Home Situation:   Home Situation  Home Environment: Private residence  # Steps to Enter: 6  Rails to Enter: Yes  One/Two Story Residence: One story  Living Alone: No  Support Systems: Spouse/Significant Other/Partner  Patient Expects to be Discharged to:: Private residence  Current DME Used/Available at Home: Walker, rolling, Cane, straight, Adaptive dressing aides  Tub or Shower Type: Shower  []  Right hand dominant   []  Left hand dominant    Cognitive/Behavioral Status:  Neurologic State: Alert  Orientation Level: Oriented X4  Cognition: Follows commands  Safety/Judgement: Awareness of environment    Coordination: BUE  Coordination: Within functional limits  Fine Motor Skills-Upper: Left Intact;Right Intact    Gross Motor Skills-Upper: Left Intact;Right Intact    Balance:  Sitting: Intact  Standing: Intact;With support    Strength: BUE  Strength: Generally decreased, functional    Tone & Sensation: BUE  Tone: Normal  Sensation: Intact    Range of Motion: BUE  AROM: Generally decreased, functional    Functional Mobility and Transfers for ADLs:  Bed Mobility:  Supine to Sit: Supervision  Sit to Supine: Supervision    Transfers:  Sit to Stand: Contact guard assistance   Assistive Device: Walker, rolling   Bathroom Mobility: Contact guard assistance(vc)    ADL Assessment:   Feeding: Modified independent  Oral Facial Hygiene/Grooming: Contact guard assistance  Bathing: Moderate assistance  Upper Body Dressing: Stand-by assistance  Lower Body Dressing: Maximum assistance  Toileting: Contact guard assistance    ADL Intervention:  Lower Body Dressing Assistance  Dressing Assistance: Total assistance(dependent)  Socks: Total assistance (dependent)  Leg Crossed Method Used: No  Position Performed: Seated edge of bed  Cues: Verbal cues provided    Cognitive Retraining  Safety/Judgement: Awareness of environment    Pain:  Pain level pre-treatment:  6/10   Pain level post-treatment: 6/10   Pain Intervention(s): Medication provided by Nursing (see MAR); Rest, Ice, Repositioning   Response to intervention: Nurse notified, See doc flow sheet    Activity Tolerance:   Fair-. Patient able to stand ~5 minute(s). Patient requires intermittent rest breaks. Patient limited by endurance, strength, pain, ROM. Patient unsteady. Please refer to the flowsheet for vital signs taken during this treatment. After treatment:   [] Patient left in no apparent distress sitting up in chair  [x] Patient left in no apparent distress seated EOB  [x] Call bell left within reach  [x] Nursing notified  [] Caregiver present  [] Bed alarm activated    COMMUNICATION/EDUCATION:   [x] Role of Occupational Therapy in the acute care setting  [x] Home safety education was provided and the patient/caregiver indicated understanding. [x] Patient/family have participated as able in goal setting and plan of care. [x] Patient/family agree to work toward stated goals and plan of care. [] Patient understands intent and goals of therapy, but is neutral about his/her participation. [] Patient is unable to participate in goal setting and plan of care. Thank you for this referral.  Vishal Hameed, OTR/L  Time Calculation: 23 mins    Eval Complexity: History: MEDIUM Complexity : Expanded review of history including physical, cognitive and psychosocial  history ; Examination: MEDIUM Complexity : 3-5 performance deficits relating to physical, cognitive , or psychosocial skils that result in activity limitations and / or participation restrictions; Decision Making:MEDIUM Complexity : Patient may present with comorbidities that affect occupational performnce.  Miniml to moderate modification of tasks or assistance (eg, physical or verbal ) with assesment(s) is necessary to enable patient to complete evaluation

## 2021-03-02 NOTE — PROGRESS NOTES
[facility]  Pulmonary, Critical Care, and Sleep Medicine    Name: Sedrick Trujillo MRN: [id number]   : 1945 Hospital: [place] [address]   Date: 3/2/2021        Pulmonary Critical Care Note    IMPRESSION:   · Acute on chronic hypercapnic hypoxic respiratory failure · J96.22 ·   COPD exacerbation · J44.1 ·   Hypotension · I95.9 ·   URIEL · N17.9     Patient Active Problem List   Diagnosis Code    Hypertension I10    COPD (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 ([facility]) J44.9    Chronic renal disease, stage 3, moderately decreased glomerular filtration rate between 30-59 mL/min/1.73 square meter N18.30    Asbestosis ([facility] [address] J61    Acute exacerbation of chronic obstructive pulmonary disease (COPD) ([facility] [address] J44.1    Debility R53.81    Paroxysmal atrial fibrillation ([facility]) I48.0    Chronic respiratory failure with hypoxia ([facility]) J96.11    Tobacco dependence F17.200    Hyponatremia E87.1    Pleural effusion, right J90    COPD with acute exacerbation ([facility]) J44.1    Acute respiratory failure with hypoxia and hypercarbia ([facility]) J96.01, J96.02    Hyponatremia E87.1    Advanced care planning/counseling discussion Z71.89    Hypokalemia E87.6    COPD (chronic obstructive pulmonary disease) ([facility]) J44.9    CAP (community acquired pneumonia) J18.9    Respiratory distress R06.03    COPD exacerbation ([facility]) J44.1    Atrial fibrillation with RVR ([facility]) I48.91    Acute respiratory failure ([facility]) J96.00      RECOMMENDATIONS:   Respiratory:   Extubated successfully to room air on 2021. Remains on 1-1.5 LPM O2. Protecting airway well. Bronchodilators: change bronchodilators to outpatient regimen on discharge, I.e pulmicort nebs bid, brovana nebs bid, duoneb and albuterol prn. Add atrovent nebs qid on dicshrge. Antibiotics: Rocephin and Zithromax for COPD exacerbation/bronchitis. Can be changed to doxy to complete total 10 day antibiotic course.   Steroids: change solumedrol To  Prednisone and taper over next 7 days   Check homw o2 eval, ordered      THE FOLLOWING ISSUES WERE LAST ADDRESSED ON 3/1/21 WHEN PATIENT WAS IN ICU. ID: no evidence of pneumonia  Rapid test negative for SARS CoV2 2/26/2021- low clinical suspicion for COVID19  Blood culture 2/26/2021: NGTD. Urine antigen panel 2/27/2021: Negative. Antibiotic choice: Rocephin and Zithromax for COPD exacerbation and possible bronchitis. Follow cultures and de-escalate antibiotics appropriately. CVS: improved hemodynamics  Not on any vasopressors. ECHO 2/27/21  · Image quality for this study was suboptimal.  · Contrast used: DEFINITY. · Left Ventricle: Normal cavity size, wall thickness and systolic function (ejection fraction normal). The estimated EF is 50 - 55%. There is mild (grade 1) left ventricular diastolic dysfunction E'E= 10.05. Poor resolution of endocardial border. Can not comment on wall motion abnormality  · Tricuspid Valve: Tricuspid valve not well visualized. No stenosis. Tricuspid regurgitation is inadequate for estimation of right ventricular systolic pressure. Renal: Creatinine improving. Making good urine output. Replace electrolytes per ICU protocol as needed. Heme: stable Hgb, plt  No evidence of active bleeding  Endo: Glycemic control as needed with steroid  GI: Discussed with speech therapist; passed swallow evaluation. Diet per speech therapist recommendation. Neurology: Hard of hearing but nothing acute. Will defer respective systems problem management to primary and other consultant and follow patient in ICU with primary and other medical team  Further recommendations will be based on the patient's response to recommended treatment and results of the investigation ordered. Quality Care: PPI, DVT prophylaxis, HOB elevated, Infection control all reviewed and addressed. PAIN AND SEDATION: None  Skin/Wound: none  Prophylaxis:  DVT prophylaxis Heparin.   GI prophylaxis Protonix. Restraints: None. PT/OT eval and treat: Ordered. Lines/Tubes:   ETT: 2/26/21 - 1/28/21  OGT/NGT: 2/26/21- 1/28/21  Central line: 2/26/21 (site examined, no erythema, induration, discharge or sign of infection. Dressing intact. Medically necessary, will remove it when not needed. Central line bundle followed). Central line to be d/w 3/1/21 before transfer out of icu, d/w RN  Juliann: 2/26/21 (Medically necessary for strict input/output monitoring in critically ill patient, will remove it when not needed. Humphreys bundle followed). To be d/c today, d/w RN      Moderate complexity decision making was performed during the evaluation of this patient at high risk for decompensation with multiple organ involvement. OK to d/c from pulmonary standpoint. D/w Dr. David Castellanos. F/u with me in 1 week at office. Subjective/History:   Mr. Chris Cardoso has been seen and evaluated as Dr. Tegan Aguirre requested for assisting with Acute on chronic hypercapnic hypoxic respiratory failure, COPD exacerbation. The patient can not provide additional history due to Ventilated and sedated. Patient is a 66 y.o. male with following PMHx presented via EMS with progressive worsening in his SOB, tripoding and failed to respond to NRB, HFNC and remained obtunded requiring intubation and ventilator support. He was recently discharged from [facility] weeks ago after COPD exacerbation related admission. He followed with Dr. Nidia Hicks in our office. Poor adherence to treatments. In ER, SARS CoV2 rapid negative; CXR no pulmonary infiltrates. After intubation required multiple sedatives for ventilator synchrony. Developed hypotension requiring vasopressor support. CVC placed by ER provider. No reported fever, chills, sore throat per staff. Other information limited. Pt seen in ICU at bedside rm 105 in droplet isolation.       03/02/21  Transferred  out of icu to medical floor on 3/1/21  On 1-1.5 LPM o2  No respi  isssues  No cough dyspnea wheezingg fever  No fever      Review of Systems:  Review of systems not obtained due to patient factors. Latest lactic acid:   Lactic acid   Date Value Ref Range Status   08/04/2020 1.3 0.4 - 2.0 MMOL/L Final   06/13/2020 1.2 0.4 - 2.0 MMOL/L Final   04/09/2020 1.1 0.4 - 2.0 MMOL/L Final       Past Medical History:  Past Medical History:   Diagnosis Date    Brain aneurysm     Cancer ([facility] [address]     prostate    COPD (chronic obstructive pulmonary disease) ([facility] [address]     Hypertension     Nocturia     Pneumonia     Radiation effect         Past Surgical History:  Past Surgical History:   Procedure Laterality Date    HX HERNIA REPAIR          Medications:  Prior to Admission medications    Medication Sig Start Date End Date Taking? Authorizing Provider   predniSONE (STERAPRED) 5 mg dose pack See administration instruction per 5mg dose pack 2/7/21   Alen Jordan MD   budesonide (PULMICORT) 0.5 mg/2 mL nbsp 2 mL by Nebulization route two (2) times a day. 2/7/21   Alen Jordan MD   albuterol-ipratropium (DUO-NEB) 2.5 mg-0.5 mg/3 ml nebu 3 mL by Nebulization route every four (4) hours as needed for Wheezing. 2/7/21   Alen Jordan MD   albuterol (PROVENTIL HFA, VENTOLIN HFA, PROAIR HFA) 90 mcg/actuation inhaler Take 2 Puffs by inhalation every four (4) hours as needed for Wheezing or Shortness of Breath. 2/7/21   Alen Jordan MD   arformoteroL (BROVANA) 15 mcg/2 mL nebu neb solution 2 mL by Nebulization route two (2) times a day. 2/7/21   Alen Jordan MD   doxycycline (MONODOX) 100 mg capsule Take 1 Cap by mouth two (2) times a day. 2/7/21   Alen Jordan MD   OXYGEN-AIR DELIVERY SYSTEMS 2 L/min by Nasal route continuous. Provider, Historical   furosemide (Lasix) 20 mg tablet Take 20 mg by mouth daily. Provider, Historical   dilTIAZem (CARDIZEM) 30 mg tablet Take 1 Tab by mouth Before breakfast, lunch, and dinner.   Patient taking differently: Take 30 mg by mouth daily. Daily 4/14/20   Chacha Plasencia MD   acetaminophen (TYLENOL) 325 mg tablet Take 650 mg by mouth every eight (8) hours as needed for Pain. Provider, Historical   dextran 70/hypromellose (ARTIFICIAL TEARS, PF, OP) Apply 2 Drops to eye as needed for Other (both eyes for dryness). Provider, Historical   diphenhydrAMINE (BENADRYL) 25 mg capsule Take 25 mg by mouth nightly as needed for Itching. Provider, Historical   tamsulosin (FLOMAX) 0.4 mg capsule Take 0.4 mg by mouth every evening.     Other, MD Blayne       Current Facility-Administered Medications   Medication Dose Route Frequency    methylPREDNISolone (PF) (SOLU-MEDROL) injection 40 mg  40 mg IntraVENous Q8H    budesonide (PULMICORT) 500 mcg/2 ml nebulizer suspension  500 mcg Nebulization BID RT    tamsulosin (FLOMAX) capsule 0.4 mg  0.4 mg Oral DAILY    amLODIPine (NORVASC) tablet 5 mg  5 mg Oral DAILY    insulin lispro (HUMALOG) injection   SubCUTAneous AC&HS    albuterol-ipratropium (DUO-NEB) 2.5 MG-0.5 MG/3 ML  3 mL Nebulization Q4H RT    azithromycin (ZITHROMAX) 500 mg in 0.9% sodium chloride 250 mL (VIAL-MATE)  500 mg IntraVENous Q24H    sodium chloride (NS) flush 5-40 mL  5-40 mL IntraVENous Q8H    heparin (porcine) injection 5,000 Units  5,000 Units SubCUTAneous Q8H    cefTRIAXone (ROCEPHIN) 1 g in sterile water (preservative free) 10 mL IV syringe  1 g IntraVENous Q24H    pantoprazole (PROTONIX) 40 mg in 0.9% sodium chloride 10 mL injection  40 mg IntraVENous DAILY       Allergy:  Allergies   Allergen Reactions    Aspirin Other (comments)     \"messes my stomach up\"        Social History:  Social History     Tobacco Use    Smoking status: Former Smoker     Types: Cigarettes    Smokeless tobacco: Never Used   Substance Use Topics    Alcohol use: No    Drug use: Never        Family History:  Family History   Problem Relation Age of Onset    Heart Disease Mother           Objective:   Vital Signs: Blood pressure (!) 152/61, pulse 79, temperature 97.2 °F (36.2 °C), resp. rate 19, height 5' 8\" (1.727 m), weight 94.2 kg (207 lb 10.8 oz), SpO2 97 %. Body mass index is 31.58 kg/m². O2 Device: Nasal cannula   O2 Flow Rate (L/min): 1.5 l/min   Temp (24hrs), Av.9 °F (36.6 °C), Min:97.2 °F (36.2 °C), Max:98.4 °F (36.9 °C)         Intake/Output:   Last shift:       07 -  1900  In: 240 [P.O.:240]  Out: 300 [Urine:300]  Last 3 shifts:  190 -  0700  In: 840 [P.O.:840]  Out: 8428 [Urine:1830]    Intake/Output Summary (Last 24 hours) at 3/2/2021 1312  Last data filed at 3/2/2021 1108  Gross per 24 hour   Intake 960 ml   Output 980 ml   Net -20 ml       Ventilator Settings:  Mode Rate Tidal Volume Pressure FiO2 PEEP   Spontaneous   430 ml  7 cm H2O 26 % 5 cm H20     Peak airway pressure: 21 cm H2O    Minute ventilation: 11.1 l/min      Lung protective strategy, Pl pressure goals less than or equal to 30. Physical Exam:  General/Neurology: Alert, awake and oriented. NAD. Head:   NCAT. Eye:   EOM intact. No icterus/pallor/cyanosis. Nose:   No nasal drainage/discharge. Throat:  Moist mucosa. Neck:   Trachea midline. Lung: Moderate air entry bilateral equal.  No rales, rhonchi. No wheezing or stridor. No prolonged expiration or accessory muscle use. Heart:   S1 S2 present. No murmur or JVD. Abdomen:  Soft. NT. ND. No palpable masses. Extremities:  No edema. No cyanosis or clubbing. Pulses: 2+ and symmetric in DP.             Data:     Recent Results (from the past 24 hour(s))   GLUCOSE, POC    Collection Time: 21  3:49 PM   Result Value Ref Range    Glucose (POC) 153 (H) 70 - 110 mg/dL   GLUCOSE, POC    Collection Time: 21  9:28 PM   Result Value Ref Range    Glucose (POC) 142 (H) 70 - 110 mg/dL   CBC WITH AUTOMATED DIFF    Collection Time: 21  1:42 AM   Result Value Ref Range    WBC 5.6 4.6 - 13.2 K/uL    RBC 3.42 (L) 4.70 - 5.50 M/uL    HGB 9.8 (L) 13.0 - 16.0 g/dL    HCT 30.0 (L) 36.0 - 48.0 %    MCV 87.7 74.0 - 97.0 FL    MCH 28.7 24.0 - 34.0 PG    MCHC 32.7 31.0 - 37.0 g/dL    RDW 13.6 11.6 - 14.5 %    PLATELET 712 853 - 207 K/uL    MPV 8.6 (L) 9.2 - 11.8 FL    NEUTROPHILS 90 (H) 40 - 73 %    LYMPHOCYTES 5 (L) 21 - 52 %    MONOCYTES 5 3 - 10 %    EOSINOPHILS 0 0 - 5 %    BASOPHILS 0 0 - 2 %    ABS. NEUTROPHILS 5.1 1.8 - 8.0 K/UL    ABS. LYMPHOCYTES 0.3 (L) 0.9 - 3.6 K/UL    ABS. MONOCYTES 0.3 0.05 - 1.2 K/UL    ABS. EOSINOPHILS 0.0 0.0 - 0.4 K/UL    ABS.  BASOPHILS 0.0 0.0 - 0.1 K/UL    DF AUTOMATED     METABOLIC PANEL, BASIC    Collection Time: 03/02/21  1:42 AM   Result Value Ref Range    Sodium 140 136 - 145 mmol/L    Potassium 4.0 3.5 - 5.5 mmol/L    Chloride 105 100 - 111 mmol/L    CO2 25 21 - 32 mmol/L    Anion gap 10 3.0 - 18 mmol/L    Glucose 143 (H) 74 - 99 mg/dL    BUN 41 (H) 7.0 - 18 MG/DL    Creatinine 1.25 0.6 - 1.3 MG/DL    BUN/Creatinine ratio 33 (H) 12 - 20      GFR est AA >60 >60 ml/min/1.73m2    GFR est non-AA 56 (L) >60 ml/min/1.73m2    Calcium 8.2 (L) 8.5 - 10.1 MG/DL   MAGNESIUM    Collection Time: 03/02/21  1:42 AM   Result Value Ref Range    Magnesium 2.4 1.6 - 2.6 mg/dL   PHOSPHORUS    Collection Time: 03/02/21  1:42 AM   Result Value Ref Range    Phosphorus 3.4 2.5 - 4.9 MG/DL   GLUCOSE, POC    Collection Time: 03/02/21  6:26 AM   Result Value Ref Range    Glucose (POC) 126 (H) 70 - 110 mg/dL   GLUCOSE, POC    Collection Time: 03/02/21  7:34 AM   Result Value Ref Range    Glucose (POC) 119 (H) 70 - 110 mg/dL   GLUCOSE, POC    Collection Time: 03/02/21 10:41 AM   Result Value Ref Range    Glucose (POC) 158 (H) 70 - 110 mg/dL           Recent Labs     02/28/21  0903 02/28/21  0558   FIO2I 30 0.3   HCO3I 25.2 23.1   PCO2I 43.5 42.1   PHI 7.37 7.35   PO2I 103* 72*       All Micro Results     Procedure Component Value Units Date/Time    CULTURE, BLOOD [[id number]] Collected: 02/26/21 1900    Order Status: Completed Specimen: Blood Updated: 03/02/21 0727     Special Requests: NO SPECIAL REQUESTS        Culture result: NO GROWTH 4 DAYS       CULTURE, BLOOD [[id number]] Collected: 02/26/21 1915    Order Status: Completed Specimen: Blood Updated: 03/02/21 0727     Special Requests: NO SPECIAL REQUESTS        Culture result: NO GROWTH 4 DAYS       LEGIONELLA PNEUMOPHILA AG, URINE [[id number]] Collected: 02/27/21 0550    Order Status: Completed Specimen: Urine, random Updated: 02/27/21 1330     Legionella Ag, urine Negative       CULTURE, RESPIRATORY/SPUTUM/BRONCH Merlene Daigle STAIN [[id number]] Collected: 02/27/21 0900    Order Status: Canceled Specimen: Sputum,ET Suction     MYCOPLASMA GENITALIUM, ANKITA, URINE [[id number]] Collected: 02/27/21 0550    Order Status: Completed Specimen: Urine Updated: 02/27/21 0609    COVID-19 RAPID TEST [[id number]] Collected: 02/26/21 1910    Order Status: Completed Specimen: Nasopharyngeal Updated: 02/26/21 1937     Specimen source Nasopharyngeal        COVID-19 rapid test Not detected        Comment: Rapid Abbott ID Now       Rapid NAAT:  The specimen is NEGATIVE for SARS-CoV-2, the novel coronavirus associated with COVID-19. Negative results should be treated as presumptive and, if inconsistent with clinical signs and symptoms or necessary for patient management, should be tested with an alternative molecular assay. Negative results do not preclude SARS-CoV-2 infection and should not be used as the sole basis for patient management decisions. This test has been authorized by the FDA under an Emergency Use Authorization (EUA) for use by authorized laboratories. Fact sheet for Healthcare Providers: ConventionUpdate.co.nz  Fact sheet for Patients: ConventionUpdate.co.nz       Methodology: Isothermal Nucleic Acid Amplification               6/15/21 ECHO  · Image quality for this study was technically difficult. Contrast used: DEFINITY.   · Normal cavity size, wall thickness and systolic function (ejection fraction normal). Estimated left ventricular ejection fraction is 60 - 65%. Mild (grade 1) left ventricular diastolic dysfunction E/E' ratio is 11. 16.  · Mildly dilated left atrium. · Mildly dilated right ventricle. · Mildly dilated right atrium. · Probably trileaflet aortic valve. · Mitral valve non-specific thickening. Mild mitral annular calcification. Trace mitral valve regurgitation is present. · Pulmonary arterial systolic pressure is 25 mmHg. Pulmonary hypertension not suggested by Doppler findings. · Severely elevated central venous pressure (15+ mmHg); IVC diameter is larger than 21 mm and collapses less than 50% with respiration. Imaging:  [x]I have personally reviewed the patients chest radiographs images and report     Results from Hospital Encounter encounter on 02/26/21   XR CHEST PORT    Narrative EXAM: XR CHEST PORT    CLINICAL INDICATION/HISTORY: Acute on chronic hypoxic hypercapnic respiratory  failure, COPD exacerbation, ET tube position    > Additional: None. COMPARISON: 2/26/2021    TECHNIQUE: Portable chest    _______________    FINDINGS:    SUPPORT DEVICES: The endotracheal tube terminates approximately 6.3 cm above the  juliana. Enteric tube courses inferolaterally below the field-of-view. HEART AND MEDIASTINUM: Normal size and contour. Normal pulmonary vasculature. LUNGS AND PLEURAL SPACES: Multifocal calcified pleural plaques are  redemonstrated with areas of scarring and atelectasis in the right lung base an  apical pleural thickening. No focal consolidation. The left lung is clear. BONY THORAX AND SOFT TISSUES: No acute osseous abnormality. _______________      Impression Support lines and tubes as above. Additional stable chronic findings as above.        Results from [place] encounter on 05/29/20   CT LOW DOSE LUNG CANCER SCREENING    Narrative EXAM: CT Chest, lung cancer screening    CLINICAL INDICATION/HISTORY: Lung cancer screening, cigarette/nicotine  dependence. COMPARISON: CTA of the chest 10/31/2019. TECHNIQUE: Chest CT from thoracic inlet through the diaphragm without contrast.  A low-dose lung cancer screening protocol was performed. Note that visualization  of non-pulmonary soft tissue structures is limited with this protocol. Coronal  and sagittal reformats were generated and reviewed. One or more dose reduction  techniques were used on this CT: automated exposure control, adjustment of the  mAs and/or kVp according to patient size, and iterative reconstruction  techniques. The specific techniques used on this CT exam have been documented  in the patient's electronic medical record. Digital Imaging and Communications  in Medicine (DICOM) format image data are available to nonaffiliated external  healthcare facilities or entities on a secure, media free, reciprocally  searchable basis with patient authorization for at least a 12-month period after  this study. DLP: 67    _______________    FINDINGS:    LUNGS:    Nodules: No suspicious nodule. Other pulmonary findings:  Focally calcified area of soft tissue thickening along the right pleural margin  both anteriorly and posteriorly is unchanged in appearance since prior  examination. OTHER:     Coronary artery ossifications are present.     _______________        Impression IMPRESSION:    No suspicious pulmonary nodule. Focal areas of peripheral pleural thickening  with peripheral calcification is unchanged in appearance since prior  examinations. Lung-RADS 1 - Negative. Management: Continue annual screening with low dose CT in 12 months. Lung cancer screening categorization and recommendations per the [employer] of radiology Lung-RADS version 1.1.          [x]See my orders for details    Lucie Chu MD  3/2/2021

## 2021-03-02 NOTE — PROGRESS NOTES
Nurse spoke to wife, wife attempts to send cab for patient's discharge transportation. Nurse informs wife that she can not send a cab for the patient. Wife states that she can not drive at night. Wife states that she will come in the morning to pick patient up.

## 2021-03-02 NOTE — ROUTINE PROCESS
Assume care of patient from Marissa/CAITLIN Cottrell. Patient received in bed awake. Patient A&Ox4, denies pain and discomfort. No distress noted. Bed locked in low position. Call bell within reach and patient verbalized understanding of use for assistance and needs. [id number]- Bedside and Verbal shift change report given to Nicole Field RN (oncoming nurse) by Kandi Harada, RN (offgoing nurse). Report included the following information SBAR, Kardex, Intake/Output, MAR and Recent Results. [facility] [place] [facility] Night Shift Chart Audit    Chart Audit completed?  YES

## 2021-03-02 NOTE — PROGRESS NOTES
Problem: Non-Violent Restraints  Goal: Removal from restraints as soon as assessed to be safe  3/1/2021 2027 by Juvencio Eddy  Outcome: Resolved/Not Met  3/1/2021 2026 by Juvencio Eddy  Outcome: Resolved/Not Met  Goal: No harm/injury to patient while restraints in use  3/1/2021 2027 by Juvencio Eddy  Outcome: Resolved/Not Met  3/1/2021 2026 by Juvencio Eddy  Outcome: Resolved/Not Met  Goal: Patient's dignity will be maintained  3/1/2021 2027 by Juvencio Eddy  Outcome: Resolved/Not Met  3/1/2021 2026 by Juvencio Eddy  Outcome: Resolved/Not Met  Goal: Patient Interventions  3/1/2021 2027 by Juvencio Eddy  Outcome: Resolved/Not Met  3/1/2021 2026 by Juvencio Eddy  Outcome: Resolved/Not Met     Problem: Pressure Injury - Risk of  Goal: *Prevention of pressure injury  Description: Document Nikos Scale and appropriate interventions in the flowsheet.   Outcome: Progressing Towards Goal  Note: Pressure Injury Interventions:  Sensory Interventions: Float heels, Keep linens dry and wrinkle-free, Assess changes in LOC, Monitor skin under medical devices, Pressure redistribution bed/mattress (bed type)    Moisture Interventions: Absorbent underpads    Activity Interventions: Pressure redistribution bed/mattress(bed type), Increase time out of bed, PT/OT evaluation    Mobility Interventions: Pressure redistribution bed/mattress (bed type), HOB 30 degrees or less, PT/OT evaluation    Nutrition Interventions: Document food/fluid/supplement intake, Offer support with meals,snacks and hydration    Friction and Shear Interventions: Apply protective barrier, creams and emollients, Foam dressings/transparent film/skin sealants, HOB 30 degrees or less, Minimize layers                Problem: Patient Education: Go to Patient Education Activity  Goal: Patient/Family Education  Outcome: Progressing Towards Goal     Problem: Falls - Risk of  Goal: *Absence of Falls  Description: Document Darell Fall Risk and appropriate interventions in the flowsheet.   Outcome: Progressing Towards Goal  Note: Fall Risk Interventions:  Mobility Interventions: Bed/chair exit alarm    Mentation Interventions: Adequate sleep, hydration, pain control    Medication Interventions: Bed/chair exit alarm, Patient to call before getting OOB, Teach patient to arise slowly    Elimination Interventions: Bed/chair exit alarm, Urinal in reach, Toilet paper/wipes in reach              Problem: Patient Education: Go to Patient Education Activity  Goal: Patient/Family Education  Outcome: Progressing Towards Goal     Problem: Ventilator Management  Goal: *Adequate oxygenation and ventilation  3/1/2021 2027 by Paul Cano  Outcome: Resolved/Not Met  3/1/2021 2026 by Paul Cano  Outcome: Resolved/Not Met  Goal: *Patient maintains clear airway/free of aspiration  3/1/2021 2027 by Paul Cano  Outcome: Resolved/Not Met  3/1/2021 2026 by Paul Cano  Outcome: Resolved/Not Met  Goal: *Absence of infection signs and symptoms  3/1/2021 2027 by Paul Cano  Outcome: Resolved/Not Met  3/1/2021 2026 by Paul Cano  Outcome: Resolved/Not Met  Goal: *Normal spontaneous ventilation  3/1/2021 2027 by Paul Cano  Outcome: Resolved/Not Met  3/1/2021 2026 by Paul Cano  Outcome: Resolved/Not Met     Problem: Patient Education: Go to Patient Education Activity  Goal: Patient/Family Education  3/1/2021 2027 by Paul Cano  Outcome: Progressing Towards Goal  3/1/2021 2026 by Paul Cano  Outcome: Resolved/Not Met     Problem: Patient Education: Go to Patient Education Activity  Goal: Patient/Family Education  Outcome: Progressing Towards Goal     Problem: Breathing Pattern - Ineffective  Goal: *Absence of hypoxia  Outcome: Progressing Towards Goal  Goal: *Use of effective breathing techniques  Outcome: Progressing Towards Goal  Goal: *PALLIATIVE CARE:  Alleviation of Dyspnea  Outcome: Progressing Towards Goal     Problem: Patient Education: Go to Patient Education Activity  Goal: Patient/Family Education  Outcome: Progressing Towards Goal

## 2021-03-03 VITALS
HEART RATE: 71 BPM | OXYGEN SATURATION: 98 % | TEMPERATURE: 98.3 F | BODY MASS INDEX: 31.47 KG/M2 | DIASTOLIC BLOOD PRESSURE: 67 MMHG | WEIGHT: 207.67 LBS | RESPIRATION RATE: 18 BRPM | HEIGHT: 68 IN | SYSTOLIC BLOOD PRESSURE: 138 MMHG

## 2021-03-03 LAB
ANION GAP SERPL CALC-SCNC: 8 MMOL/L (ref 3–18)
BASOPHILS # BLD: 0 K/UL (ref 0–0.1)
BASOPHILS NFR BLD: 0 % (ref 0–2)
BUN SERPL-MCNC: 39 MG/DL (ref 7–18)
BUN/CREAT SERPL: 33 (ref 12–20)
CALCIUM SERPL-MCNC: 7.9 MG/DL (ref 8.5–10.1)
CHLORIDE SERPL-SCNC: 103 MMOL/L (ref 100–111)
CO2 SERPL-SCNC: 28 MMOL/L (ref 21–32)
COMMENT, 180044: NORMAL
CREAT SERPL-MCNC: 1.2 MG/DL (ref 0.6–1.3)
DIFFERENTIAL METHOD BLD: ABNORMAL
EOSINOPHIL # BLD: 0 K/UL (ref 0–0.4)
EOSINOPHIL NFR BLD: 0 % (ref 0–5)
ERYTHROCYTE [DISTWIDTH] IN BLOOD BY AUTOMATED COUNT: 13.6 % (ref 11.6–14.5)
GLUCOSE BLD STRIP.AUTO-MCNC: 131 MG/DL (ref 70–110)
GLUCOSE SERPL-MCNC: 124 MG/DL (ref 74–99)
HCT VFR BLD AUTO: 31.7 % (ref 36–48)
HGB BLD-MCNC: 10 G/DL (ref 13–16)
LYMPHOCYTES # BLD: 0.5 K/UL (ref 0.9–3.6)
LYMPHOCYTES NFR BLD: 8 % (ref 21–52)
M GENITALIUM DNA SPEC QL NAA+PROBE: NEGATIVE
MAGNESIUM SERPL-MCNC: 2.3 MG/DL (ref 1.6–2.6)
MCH RBC QN AUTO: 27.7 PG (ref 24–34)
MCHC RBC AUTO-ENTMCNC: 31.5 G/DL (ref 31–37)
MCV RBC AUTO: 87.8 FL (ref 74–97)
MONOCYTES # BLD: 0.6 K/UL (ref 0.05–1.2)
MONOCYTES NFR BLD: 10 % (ref 3–10)
NEUTS SEG # BLD: 5.1 K/UL (ref 1.8–8)
NEUTS SEG NFR BLD: 82 % (ref 40–73)
PHOSPHATE SERPL-MCNC: 3.1 MG/DL (ref 2.5–4.9)
PLATELET # BLD AUTO: 271 K/UL (ref 135–420)
PMV BLD AUTO: 8.7 FL (ref 9.2–11.8)
POTASSIUM SERPL-SCNC: 3.8 MMOL/L (ref 3.5–5.5)
RBC # BLD AUTO: 3.61 M/UL (ref 4.7–5.5)
SODIUM SERPL-SCNC: 139 MMOL/L (ref 136–145)
SPECIMEN SOURCE: NORMAL
WBC # BLD AUTO: 6.1 K/UL (ref 4.6–13.2)

## 2021-03-03 PROCEDURE — 74011250637 HC RX REV CODE- 250/637: Performed by: INTERNAL MEDICINE

## 2021-03-03 PROCEDURE — 74011000250 HC RX REV CODE- 250: Performed by: INTERNAL MEDICINE

## 2021-03-03 PROCEDURE — 82962 GLUCOSE BLOOD TEST: CPT

## 2021-03-03 PROCEDURE — 80048 BASIC METABOLIC PNL TOTAL CA: CPT

## 2021-03-03 PROCEDURE — 74011250637 HC RX REV CODE- 250/637: Performed by: HOSPITALIST

## 2021-03-03 PROCEDURE — 85025 COMPLETE CBC W/AUTO DIFF WBC: CPT

## 2021-03-03 PROCEDURE — 84100 ASSAY OF PHOSPHORUS: CPT

## 2021-03-03 PROCEDURE — 36415 COLL VENOUS BLD VENIPUNCTURE: CPT

## 2021-03-03 PROCEDURE — 83735 ASSAY OF MAGNESIUM: CPT

## 2021-03-03 PROCEDURE — 74011250636 HC RX REV CODE- 250/636: Performed by: INTERNAL MEDICINE

## 2021-03-03 PROCEDURE — 77010033678 HC OXYGEN DAILY

## 2021-03-03 PROCEDURE — 74011636637 HC RX REV CODE- 636/637: Performed by: INTERNAL MEDICINE

## 2021-03-03 PROCEDURE — 92526 ORAL FUNCTION THERAPY: CPT

## 2021-03-03 PROCEDURE — 94760 N-INVAS EAR/PLS OXIMETRY 1: CPT

## 2021-03-03 PROCEDURE — 94640 AIRWAY INHALATION TREATMENT: CPT

## 2021-03-03 RX ADMIN — HEPARIN SODIUM 5000 UNITS: 5000 INJECTION INTRAVENOUS; SUBCUTANEOUS at 09:09

## 2021-03-03 RX ADMIN — TAMSULOSIN HYDROCHLORIDE 0.4 MG: 0.4 CAPSULE ORAL at 09:07

## 2021-03-03 RX ADMIN — DOXYCYCLINE 100 MG: 100 CAPSULE ORAL at 09:08

## 2021-03-03 RX ADMIN — IPRATROPIUM BROMIDE AND ALBUTEROL SULFATE 3 ML: .5; 3 SOLUTION RESPIRATORY (INHALATION) at 03:23

## 2021-03-03 RX ADMIN — AMLODIPINE BESYLATE 5 MG: 5 TABLET ORAL at 09:08

## 2021-03-03 RX ADMIN — PREDNISONE 60 MG: 20 TABLET ORAL at 09:08

## 2021-03-03 RX ADMIN — BUDESONIDE 500 MCG: 0.5 INHALANT RESPIRATORY (INHALATION) at 07:13

## 2021-03-03 RX ADMIN — IPRATROPIUM BROMIDE AND ALBUTEROL SULFATE 3 ML: .5; 3 SOLUTION RESPIRATORY (INHALATION) at 07:13

## 2021-03-03 RX ADMIN — IPRATROPIUM BROMIDE AND ALBUTEROL SULFATE 3 ML: .5; 3 SOLUTION RESPIRATORY (INHALATION) at 11:07

## 2021-03-03 RX ADMIN — HEPARIN SODIUM 5000 UNITS: 5000 INJECTION INTRAVENOUS; SUBCUTANEOUS at 03:01

## 2021-03-03 RX ADMIN — PANTOPRAZOLE SODIUM 40 MG: 40 TABLET, DELAYED RELEASE ORAL at 09:07

## 2021-03-03 NOTE — PROGRESS NOTES
0800: Assumed patient care,,this nurse spoke to patient's wife about the discharge yesterday per patient's wife she was unable to lift patient's home oxygen tank and needed assistance with getting oxygen tank in the car. Jen Sujey She will have someone to come over this morning to get oxygen tank in car. .she will be picking patient up around 11:30    0900:  All of patient's AM medications given this morning without difficulty,,Dr Chapo Lindsey and Chad Villasenor from speech is at the bedside following up with patient before discharge in the late morning    1130: Patient had telemetry and IVs removed yesterday 3/2/21,,this nurse reviewed patient's discharge instruction with him,,patient verbalized understanding,,his left hearing aid is intact in his ear,,transport called

## 2021-03-03 NOTE — DISCHARGE SUMMARY
[address] [facility] SUMMARY    Name:  Christian Talavera  MR#:   [id number]  :  1945  ACCOUNT #:  [de-identified]  ADMIT DATE:  2021  DISCHARGE DATE:  2021      ADDENDUM    DIAGNOSES AT DISCHARGE:  1.  Acute on chronic hypercapnic hypoxic respiratory failure. 2.  Chronic obstructive pulmonary disease exacerbation. 3.  Chronic respiratory failure, on home oxygen. 4.  Dysphasia. 5.  Tobacco use. 6.  Hypertension. 7.  Paroxysmal atrial fibrillation. 8.  Obesity. CLINICAL UPDATE:  The patient was discharged yesterday but due to complications with his wife's availability to pick him up at night, he was held overnight. There were no acute clinical changes from last night until this morning. He is sitting comfortably up on the edge of the bed this morning in good spirits. He is hard of hearing but otherwise no new issues. He complains of a mild dry cough. Blood pressure is 166/81, pulse 88, temperature 97.8, respiratory rate is 20, he is saturating 100% on 2 L. Lungs are clear. Cardiac exam, regular rate and rhythm. Abdomen is obese, soft, nontender. Lower extremities, no pitting edema. H and H are 10 and 31.7, platelets are 994,744. His metabolic panel is unremarkable. His glucose is 131. The patient has discharge medications and instructions already in place with followup with Dr. Edgardo Booker on ; Dr. Claire Kenny, his PCP, on 03/10. He declined home health apparently. DISCHARGE MEDICATIONS:  His meds for discharge are the same as were dictated already by Dr. Lisa Macias, and those include the following just to update:  1. Tylenol 650 every eight hours as needed for pain. 2.  Albuterol two puffs every four hours as needed. 3.  DuoNeb one neb every four hours needed for wheezing. 4.  Norvasc 5 mg daily. 5.  Brovana nebs twice daily. 6.  Benadryl 25 mg at night as needed for itching. 7.  Budesonide 2 mL by nebs twice daily.   8.  Diltiazem IR 30 mg before breakfast, lunch, and dinner. 9.  Doxycycline 100 mg twice daily for seven more days. 10.  Lasix 20 mg daily. 11.  Oxygen 2 L.  12.  Prednisone dose taper pack from 60 mg to 10 mg over the next five to seven days. 13.  Flomax 0.4 mg nightly. He has no new concerns at this time. He is stable to release from the hospital.  Tobacco cessation has been counseled. Total time on discharge, 45 minutes.       Ayala Montero MD      [id number]  D:  03/03/2021 10:27  T:  03/03/2021 11:20  JOB #:  [id number]

## 2021-03-03 NOTE — ROUTINE PROCESS
Assume care of patient from [facility]. Patient received in bed awake. Patient A&Ox4, denies pain and discomfort. No distress noted. Bed locked in low position. Call bell within reach and patient verbalized understanding of use for assistance and needs. 4142- Bedside and Verbal shift change report given to CAITLIN Cota (oncoming nurse) by Marni Gonzalez RN (offgoing nurse). Report included the following information SBAR, Kardex, Intake/Output, MAR and Recent Results. [facility] Cardiac[facility]Medical Night Shift Chart Audit    Chart Audit completed?  YES

## 2021-03-04 ENCOUNTER — PATIENT OUTREACH (OUTPATIENT)
Dept: CASE MANAGEMENT | Age: 76
End: 2021-03-04

## 2021-03-04 NOTE — PROGRESS NOTES
Care Transitions Initial Follow Up Call    Call within 2 business days of discharge: Yes     Patient: Chantel Bonilla Patient : 1945 MRN: [id number]    Last Discharge [address]       Complaint Diagnosis Description Type Department Provider    21 Shortness of Breath SOB (shortness of breath) ED to Hosp-Admission (Discharged) (ADMIT) [facility] Jose Jordan MD; Lidia. .. Challenges to be reviewed by the provider   Additional needs identified to be addressed with provider no  none         Method of communication with provider : phone    Discussed COVID-19 related testing which was available at this time. Test results were negative. Patient informed of results, if available? yes     Advance Care Planning:   Does patient have an Advance Directive: yes, reviewed and current     Inpatient Readmission Risk score: Unplanned Readmit Risk Score: 39    Was this a readmission? yes   Patient stated reason for the admission: could not breathe    Patients top risk factors for readmission: medical condition and smoking COPD  Interventions to address risk factors: Obtained and reviewed discharge summary and/or continuity of care documents, Education of patient/family/caregiver/guardian to support self-management-COPD and AMD education started and Assessment and support for treatment adherence and medication management-reviewed medications and reason for taking them with patient and wife    Care Transition Nurse (CTN) contacted the patient by telephone to perform post hospital discharge assessment. Verified name and  with patient as identifiers. Provided introduction to self, and explanation of the CTN role. CTN reviewed discharge instructions, medical action plan and red flags with patient who verbalized understanding. Were discharge instructions available to patient? yes.  Reviewed appropriate site of care based on symptoms and resources available to patient including: PCP, Specialist and When to call 911. Patient given an opportunity to ask questions and does not have any further questions or concerns at this time. The patient agrees to contact the PCP office for questions related to their healthcare. Patient was getting a little of short of breath talking on the phone so CTN got his verbal permission to speak with his wife. Medication reconciliation was performed with family, who verbalizes understanding of administration of home medications. Advised obtaining a 90-day supply of all daily and as-needed medications. Referral to Pharm D needed: no     Home Health/Outpatient orders at discharge: refused    [facility] ordered at discharge: None    Covid Risk Education    Education provided regarding infection prevention, and signs and symptoms of COVID-19 and when to seek medical attention with family who verbalized understanding. Discussed exposure protocols and quarantine From CDC: Are you at higher risk for severe illness?  and given an opportunity for questions and concerns. The family agrees to contact the COVID-19 hotline [phone] or PCP office for questions related to COVID-19. For more information on steps you can take to protect yourself, see CDC's How to Protect Yourself     Was patient discharged with a pulse oximeter? no Discussed and confirmed pulse oximeter discharge instructions and when to notify provider or seek emergency care. Patient/family/caregiver given information for [employer] and agrees to enroll no  Patient's preferred e-mail: declines  Patient's preferred phone number: declines    Discussed follow-up appointments. If no appointment was previously scheduled, appointment scheduling offered: yes Is follow up appointment scheduled within 7 days of discharge? yes   [facility] follow up appointment(s): No future appointments.   [facility] follow up appointment(s): PCP appointment Jonnie Downey:3/10/21  Pulmonology 3/11/21    Plan for follow-up call in 5-7 days based on severity of symptoms and risk factors. Plan for next call: self management-smoking sessation and follow up appointment-PCP and Pulmonology  CTN provided contact information for future needs. Goals Addressed                 This Visit's Progress     Prevent complications post hospitalization. 1. CTN will monitor X 4 weeks    2. Ensure provider appt is scheduled within 7 days post-discharge  PCP: 3/10/21  Pulmonology: 3/11/21  3. Confirm patient attended post-discharge provider apt    4. Complete post-visit call to confirm attendance and update care needs      5. Review/educate common or potential \"red flags\" of condition worsening   CALL MD if you have:  · Shortness of breath does not cease with bronchodilator treatments inhaled one hour apart  · Wheezing does not cease with bronchodilator treatments inhaled one hour apart  · Sputum changes in color, odor, stickiness or thickness  · Ankle swelling persists after a night of sleeping with feet up  · Waking up  at night short of breath  · Severe fatigue    CALL MD IMMEDIATELY IF :  · Inability to speak  · Severe shortness of breath  · Exhaustion during acute respiratory infection  · Confusion during acute respiratory infection  · Slurring of speech during acute respiratory infection  · Forgetfulness during acute respiratory infection  · Severe weakness  · Fever of 101 degrees Fahrenheit or above  · Shaking chills  · Coughing up blood     CALL 911IMMEDIATELY if:  · Trouble breathing  · Inability to speak in full sentences  · Cannot be woken up easily by others    6. Evaluate adherence to medications and priority barriers to resolve      3/4/2021 no barriers to obtaining medications    Patient receives 90 day supplies for as many medications as possible  7. Instruct on adherence to medications as ordered and assess for therapeutic response and side-effects         8. Discuss and evaluate ADL performance.   Provide recommendations on energy conservation, particularly related to transition home from an inpatient admission.

## 2021-03-07 NOTE — ED PROVIDER NOTES
EMERGENCY DEPARTMENT HISTORY AND PHYSICAL EXAM    Date: 2/26/2021  Patient Name: Sedrick Trujillo    History of Presenting Illness     Chief Complaint   Patient presents with    Shortness of Breath         History Provided By: Patient and EMS    Additional History (Context):   7:07 PM  Sedrick Trujillo is a 66 y.o. male with PMHX of COPD, brain aneurysm who presents to the emergency department by EMS for acute respiratory failure. He was just admitted on February 2, 3-1/2 weeks ago for shortness of breath. Paramedics found on his home sitting upright in chair tripoding unable to speak with O2 sats of 90%. He delivered an JEREMIAH treatment attempted CPAP and BiPAP without success. Is been no history of fevers or no neuro ill contacts according to paramedics. No further history is available. Social History  Unknown    Family History  Unknown    PCP: Alexi Calles MD    Current Outpatient Medications   Medication Sig Dispense Refill    doxycycline (MONODOX) 100 mg capsule Take 1 Cap by mouth two (2) times a day for 15 doses. 15 Cap 0    predniSONE (STERAPRED DS) 10 mg dose pack Take 6 tablets p.o. daily x1 day, take 5 tablets p.o. daily x1 day, take 4 tablets p.o. daily x1 day, take 3 tabs p.o. daily x1 day, take 2 tablets p.o. daily x1 day, and take 1 tablet p.o. daily x1 day. 21 Tab 0    amLODIPine (NORVASC) 5 mg tablet Take 1 Tab by mouth daily. 30 Tab 0    budesonide (PULMICORT) 0.5 mg/2 mL nbsp 2 mL by Nebulization route two (2) times a day. 60 mL 0    albuterol-ipratropium (DUO-NEB) 2.5 mg-0.5 mg/3 ml nebu 3 mL by Nebulization route every four (4) hours as needed for Wheezing. 30 Nebule 0    albuterol (PROVENTIL HFA, VENTOLIN HFA, PROAIR HFA) 90 mcg/actuation inhaler Take 2 Puffs by inhalation every four (4) hours as needed for Wheezing or Shortness of Breath. 1 Inhaler 1    arformoteroL (BROVANA) 15 mcg/2 mL nebu neb solution 2 mL by Nebulization route two (2) times a day.  [address] DELIVERY SYSTEMS 2 L/min by Nasal route continuous.  furosemide (Lasix) 20 mg tablet Take 20 mg by mouth daily.  dilTIAZem (CARDIZEM) 30 mg tablet Take 1 Tab by mouth Before breakfast, lunch, and dinner. (Patient taking differently: Take 30 mg by mouth daily. Daily) 90 Tab 0    acetaminophen (TYLENOL) 325 mg tablet Take 650 mg by mouth every eight (8) hours as needed for Pain.  dextran 70/hypromellose (ARTIFICIAL TEARS, PF, OP) Apply 2 Drops to eye as needed for Other (both eyes for dryness).  diphenhydrAMINE (BENADRYL) 25 mg capsule Take 25 mg by mouth nightly as needed for Itching.  tamsulosin (FLOMAX) 0.4 mg capsule Take 0.4 mg by mouth every evening. Past History     Past Medical History:  Past Medical History:   Diagnosis Date    Brain aneurysm     Cancer ([address]     prostate    [facility]     Hypertension     Nocturia     Pneumonia     Radiation effect        Past Surgical History:  Past Surgical History:   Procedure Laterality Date    HX HERNIA REPAIR         Family History:  Family History   Problem Relation Age of Onset    Heart Disease Mother        Social History:  Social History     Tobacco Use    Smoking status: Former Smoker     Types: Cigarettes    Smokeless tobacco: Never Used   Substance Use Topics    Alcohol use: No    Drug use: Never       Allergies: Allergies   Allergen Reactions    Aspirin Other (comments)     \"messes my stomach up\"         Review of Systems   Review of Systems   Unable to perform ROS: Severe respiratory distress     Physical Exam     Vitals:    03/03/21 0805 03/03/21 0808 03/03/21 1035 03/03/21 1108   BP: (!) 163/86 (!) 166/81 138/67    Pulse: 88  71    Resp: 20  18    Temp: 97.8 °F (36.6 °C)  98.3 °F (36.8 °C)    SpO2: 100%  96% 98%   Weight:       Height:         Physical Exam  Vitals signs and nursing note reviewed. Constitutional:       General: He is in acute distress.       Appearance: He is well-developed and overweight. He is ill-appearing and diaphoretic. HENT:      Head: Normocephalic and atraumatic. Eyes:      General: No scleral icterus. Extraocular Movements:      Right eye: Normal extraocular motion. Left eye: Normal extraocular motion. Conjunctiva/sclera: Conjunctivae normal.      Pupils: Pupils are equal, round, and reactive to light. Neck:      Musculoskeletal: Normal range of motion and neck supple. Trachea: No tracheal deviation. Cardiovascular:      Rate and Rhythm: Regular rhythm. Tachycardia present. Heart sounds: Normal heart sounds. Pulmonary:      Effort: Tachypnea, accessory muscle usage, prolonged expiration, respiratory distress and retractions present. Breath sounds: Decreased air movement present. No stridor. Comments: Diffuse inspiratory and expiratory wheezing with neck scattered rhonchi. There is diminished respiratory effort and volume of air movement  Abdominal:      General: Bowel sounds are decreased. There is no distension. Palpations: Abdomen is soft. Musculoskeletal: Normal range of motion. General: No tenderness. Comments: Grossly unremarkable without abnormalities   Skin:     General: Skin is cool and moist.      Capillary Refill: Capillary refill takes 2 to 3 seconds. Findings: No erythema or rash. Neurological:      Mental Status: He is alert. He is disoriented. GCS: GCS eye subscore is 3. GCS verbal subscore is 2. GCS motor subscore is 5. Comments: No focal motor deficit   Psychiatric:      Comments: Unable to assess due to condition       Diagnostic Study Results     Labs -     Lab Results        Contains critical data POC G3 (Final result)   Component (Lab Inquiry)  Collection Time Result Time IDEV FIO2I PHI PCO2I PO2I   02/26/21 20:51:00 02/26/21 20:59:18 VENT 1.0 7.06Low Panic   99.8   (NOTE)   In patient kylah.. Heddy  High  82       Collection Time Result Time HCO3I SO2I IBD IMODE IVT   02/26/21 20:51:00 02/26/21 20:59:18 28. 2High  89Low  2   THIS IS A NEGATIVE BASE EXCESS RESULT ASSIST CONTROL 450       Collection Time Result Time Michelle SUAREZ   02/26/21 20:51:00 02/26/21 20:59:18 18 5 YES 18 RIGHT RADIAL       Collection Time Result Time PTEMPI SPECTI [facility]   02/26/21 20:51:00 02/26/21 20:59:18 98.6 ARTERIAL Wicher Freddy YES       Final result                        Contains abnormal data CBC WITH AUTOMATED DIFF (Final result)   Component (Lab Inquiry)  Collection Time Result Time WBC RBC HGB HCT MCV   02/26/21 19:15:00 02/26/21 19:30:27 11.3 4.11Low  11.7Low  36.4 88.6       Collection Time Result Time MCH MCHC RDW PLT MPLV   02/26/21 19:15:00 02/26/21 19:30:27 28.5 32.1 14.0 377 8.8Low        Collection Time Result Time GRANS LYMPH MONOS EOS BASOS   02/26/21 19:15:00 02/26/21 19:30:27 53 30 9 7High  1       Collection Time Result Time ABG ABL ABM JAIR ABB   02/26/21 19:15:00 02/26/21 19:30:27 6.1 3.4 1.0 0.8High  0.1       Collection Time Result Time DF   02/26/21 19:15:00 02/26/21 19:30:27 AUTOMATED       Final result                        Contains abnormal data METABOLIC PANEL, COMPREHENSIVE (Final result)   Component (Lab Inquiry)  Collection Time Result Time NA K CL CO2 AGAP   02/26/21 19:15:00 02/26/21 19:48:27 136 4.2 101 29 6       Collection Time Result Time GLU BUN CREA BUCR GFRAA   02/26/21 19:15:00 02/26/21 19:48:27 177High  23High  1. 49High  15 55Low        Collection Time Result Time GFRNA CA TBIL GPT SGOT   02/26/21 19:15:00 02/26/21 19:48:27 46   (NOTE)   Estimated GFR . Pilo Rathke Pilo Rathke Low  8.9 0.3 23 13       Collection Time Result Time AP TP ALB GLOB AGRAT   02/26/21 19:15:00 02/26/21 19:48:27 129High  6.8 3.4 3.4 1.0       Final result                        Contains abnormal data CARDIAC PANEL,(CK, CKMB & TROPONIN) (Final result)   Component (Lab Inquiry)  Collection Time Result Time CKRMB CKND1 CPK TROQR   02/26/21 19:15:00 02/26/21 19:51:28 3.1 4.8High  64 <0.02   The presence of detect. ..       Final result                        NT-PRO BNP (Final result)   Component (Lab Inquiry)  Collection Time Result Time PBNP   02/26/21 19:15:00 02/26/21 20:28:17 89       For patients. ..         Final result                          MAGNESIUM (Final result)   Component (Lab Inquiry)  Collection Time Result Time MG   02/26/21 19:15:00 02/26/21 19:46:27 2.1         Final result             POC LACTIC ACID (Final result)   Component (Lab Inquiry)  Collection Time Result Time [facility]   02/26/21 19:12:00 02/26/21 19:19:27 0.94         Final result                          COVID-19 RAPID TEST (Final result)   Component (Lab Inquiry)  Collection Time Result Time COVRS COVR   02/26/21 19:10:00 02/26/21 19:37:36 Nasopharyngeal Not detected   Rapid Abbott ID Now   . ..         Final result               Radiologic Studies -   XR CHEST PORT   Final Result      Support lines and tubes as above. Additional stable chronic findings as above. XR ABD (KUB)   Final Result      NG tube in the distal stomach. XR CHEST PORT   Final Result         1. No significant change other than Saqib of endotracheal tube, which appears   in satisfactory position.         CT Results  (Last 48 hours)    None        CXR Results  (Last 48 hours)    None          Medications given in the ED-  Medications   vecuronium (NORCURON) injection 10 mg (0 mg IntraVENous Held 2/26/21 2235)   albuterol-ipratropium (DUO-NEB) 2.5 MG-0.5 MG/3 ML (3 mL Nebulization Given 2/26/21 1911)   albuterol-ipratropium (DUO-NEB) 2.5 MG-0.5 MG/3 ML (3 mL Nebulization Given 2/26/21 1910)   methylPREDNISolone (PF) (Solu-MEDROL) injection 125 mg (125 mg IntraVENous Given 2/26/21 1911)   LORazepam (ATIVAN) injection 1 mg (1 mg IntraVENous Given 2/26/21 1921)   LORazepam (ATIVAN) injection 2 mg (2 mg IntraVENous Given 2/26/21 2016)   LORazepam (ATIVAN) injection 2 mg (2 mg IntraVENous Given 2/26/21 2015)   sodium chloride 0.9 % bolus infusion 1,000 mL (0 mL IntraVENous IV Completed 2/26/21 2145)   cefTRIAXone (ROCEPHIN) 2 g in sterile water (preservative free) 20 mL IV syringe (2 g IntraVENous Given 2/26/21 2138)   azithromycin (ZITHROMAX) 500 mg in 0.9% sodium chloride 250 mL (VIAL-MATE) (500 mg IntraVENous Given 2/26/21 2139)   perflutren lipid microspheres (DEFINITY) in NS bolus IV (1 mL IntraVENous Given 2/27/21 1350)         Medical Decision Making   I am the first provider for this patient. I reviewed the vital signs, available nursing notes, past medical history, past surgical history, family history and social history. Vital Signs-Reviewed the patient's vital signs. Pulse Oximetry Analysis -  92% on nonrebreather  97% on heated 60 L/min high flow    Cardiac Monitor:  Rate: 108 bpm  Rhythm: Sinus rhythm    EKG interpretation: (Preliminary)  7:10 PM   Sinus tachycardia rate 121, nonspecific ST changes, QTC is 411  EKG read by Abdirizak Bay MD     Records Reviewed: NURSING NOTES AND PREVIOUS MEDICAL RECORDS    Provider Notes (Medical Decision Making):   Patient comes by EMS with severe respiratory distress immediately upon presentation. His O2 sats improved to 78 however patient looks anxious and somewhat fatigued. We attempted a quick trial of Ativan to see if he could tolerate transition to BiPAP with only improvement over the next 5 minutes or so. Duo nebs and steroids. Anti-inflammatory immediate effect. Due to ambiguous history Covid exposure we called [facility] for assistance with intubation we continue with ordering labs and make preparations for propofol and or Ativan for sedation along with central line placement. Antibiotics were ordered for bacterial infectious sources of sepsis however fluids were reserved due to potential Covid until more clear determination can be made.   Other possible but unlikely considerations would be for ACS PE or TIA followed more likely by aspiration or intra-abdominal infection. Rocephin and Zithromax were ordered almost reflexively however increased coverage for hospital-acquired pneumonia in speaking with the hospitalist that we discovered he was just in the hospital earlier in the month. Procedures:  Central Line    Date/Time: 2/26/2021 9:01 PM  Performed by: Josh Prater MD  Authorized by: Josh Prater MD     Consent:     Consent obtained:  Emergent situation  Universal protocol:     Relevant documents present and verified: yes      Test results available and properly labeled: yes      Imaging studies available: yes      Immediately prior to procedure, a time out was called: yes      Patient identity confirmed:  Arm band  Pre-procedure details:     Hand hygiene: Hand hygiene performed prior to insertion      Sterile barrier technique: All elements of maximal sterile technique followed      Skin preparation:  2% chlorhexidine and Betadine  Sedation:     Sedation type: patient intubated. Anesthesia (see MAR for exact dosages): Anesthesia method:  Local infiltration    Local anesthetic:  Lidocaine 1% w/o epi  Procedure details:     Site selection rationale:  Resp failure, no PTX risk    Patient position:  Flat    Procedural supplies:  Triple lumen    Catheter size:  7 Fr    Landmarks identified: yes      Number of attempts:  1    Successful placement: yes    Post-procedure details:     Post-procedure:  Dressing applied    Assessment:  Blood return through all ports and free fluid flow    Patient tolerance of procedure: Tolerated well, no immediate complications        ED Course:   7:07 PM: Initial assessment performed. The patients presenting problems have been discussed, and they are in agreement with the care plan formulated and outlined with them. I have encouraged them to ask questions as they arise throughout their visit.       CONSULT NOTE:   9:45 PM  Dr. Liz Estrada   Specialty: Pulmonary critical care specialist  Discussed pt's hx, disposition, and available diagnostic and imaging results  over the telephone. Reviewed care plans. Consulting physician agrees with plans as outlined. Agrees to continue care within the ICU. CONSULT NOTE:   10:18 PM  Elaine Ta MD   spoke with Dr. Allison Camacho  Specialty: Hospitalist  Discussed pt's hx, disposition, and available diagnostic and imaging results  over the telephone. Reviewed care plans. Consulting physician agrees with plans as outlined. Agrees to continue the care of the patient within the ICU. Diagnosis and Disposition     CRITICAL CARE NOTE:  10:17 PM  I have spent 75 minutes of critical care time involved in lab review, consultations with specialist, family decision-making, and documentation. During this entire length of time I was immediately available to the patient. Critical Care: The reason for providing this level of medical care for this critically ill patient was due a critical illness that impaired one or more vital organ systems such that there was a high probability of imminent or life threatening deterioration in the patients condition. This care involved high complexity decision making to assess, manipulate, and support vital system functions, to treat this degreee vital organ system failure and to prevent further life threatening deterioration of the patients condition. Core Measures:  For Hospitalized Patients:    1. Hospitalization Decision Time:  The decision to hospitalize the patient was made by Elaine Ta MD   at 7:20 PM on 2/26/2021    2. Aspirin: Aspirin was not given because the patient did not present with a stroke at the time of their Emergency Department evaluation    9:34 PM  Patient is being admitted to the hospital by Dr. Allison Camacho. The results of their tests and reasons for their admission have been discussed with them and/or available family. They convey agreement and understanding for the need to be admitted and for their admission diagnosis. CONDITIONS ON ADMISSION:  Sepsis is not present at the time of admission. Deep Vein Thrombosis is not present at the time of admission. Thrombosis is present at the time of admission. Urinary Tract Infection is not present at the time of admission. Pneumonia is not present at the time of admission. MRSA is not present at the time of admission. Wound infection is not present at the time of admission. Pressure Ulcer is not present at the time of admission. CLINICAL IMPRESSION:    1. SOB (shortness of breath)      _______________________________    This note was partially transcribed via voice recognition software. Although efforts have been made to catch any discrepancies, it may contain sound alike words, grammatical errors, or nonsensical words.

## 2021-03-24 NOTE — ROUTINE PROCESS
Behavioral Health Belongings     Informed of Valuables Policy  Yes No         Item                  Qty   Clothing:     Home   Bin   Security Lock Up Room Returned   Date/Initials   2 Pants black  x       Shirts with strings         Shoes / boots with strings         Coats / jackets with strings         Other                               Behavioral Health Belongings List                     [id number]                          Qty       Luggage / Bags:   Home   Bin   Security Lock Up Room Returned Date/Initials    Suitcases / Luggage with straps         Gym Bags / purses with long straps         Backpack         Plastic bags (including Ziploc)         Other                     Qty   Grooming / Hygiene Items:   Home   Bin   Security Lock Up Room Returned Date/Initials    Perfume / [place]         Mouthwash         Items in glass bottles (nail polish, makeup, etc.)         Aerosol cans (hair spray, mousse, etc         Electric razor         Disposable razor         Shaving cream         Dental floss         Metal files, nail clippers, tweezers, etc.         Hairdryer         Curling iron         Other                                                             Behavioral Health Belongings List                            [id number]                               Qty   Electronic Devices:   Home   Bin   Security Lock Up Room Returned Date/Initials    Cell phone, iPhone, Blackberry, etc.         iPod / Media player         Chargers / cords         Headphones         Other                              Qty   Smoking Items:   Home   Bin   Security Lock Up Room Returned Date/Initials    Cigarettes, cigars         Lighters         Matches         Loose tobacco for rolling cigarettes, Chew, Pipe tobacco         Tobacco pipes         Rolling Papers         Other                                                  Behavioral Health Belongings List                          [id number]                                              Bedside and Verbal shift change report given to EDWIN Aguayo RN (oncoming nurse) by Samanta Godfrey (offgoing nurse). Report included the following information SBAR, Kardex, Intake/Output and MAR.       .                                .            Qty   Cards, Keys, Valuables:   Home   Bin   Security Lock Up Room Returned Date/Initials    Keys         Wallet / Purse         Cash (Encourage  to home or Loss Prevention)         Credit Cards (Encourage home or Loss Prevention)         Checkbook (Encourage home or Loss Prevention)         Jewelry - Specify         Other                              Qty   Other / Various Sharps:   Home   Bin   Security Lock Up Room Returned Date/Initials    Brace         Cane         Contacts         Dentures U / L         Hearing Aide L / R         Partials / Retainer         Prothesis         Scooter         Walker         W/C         Medication         Other                                                                             Behavioral Health Belongings List                            [id number]                           Qty   Other Misc Items   Home   Bin   Security Lock Up Room Returned Date/Initials                                                                    Signature at Admission: April DAX Chavez                                                                Date: 03-    Signature at Update ______________________________________________Date: _________    Signature at Discharge ____________________________________________Date: _________                                                                    Behavioral Health Belongings List                       [id number]

## 2021-04-05 ENCOUNTER — APPOINTMENT (OUTPATIENT)
Dept: GENERAL RADIOLOGY | Age: 76
DRG: 469 | End: 2021-04-05
Attending: PHYSICIAN ASSISTANT
Payer: MEDICARE

## 2021-04-05 ENCOUNTER — APPOINTMENT (OUTPATIENT)
Dept: VASCULAR SURGERY | Age: 76
DRG: 469 | End: 2021-04-05
Attending: PHYSICIAN ASSISTANT
Payer: MEDICARE

## 2021-04-05 ENCOUNTER — HOSPITAL ENCOUNTER (INPATIENT)
Age: 76
LOS: 9 days | Discharge: NURSING, REHAB OR LONG-TERM CARE | DRG: 469 | End: 2021-04-14
Attending: EMERGENCY MEDICINE | Admitting: HOSPITALIST
Payer: MEDICARE

## 2021-04-05 DIAGNOSIS — E87.6 HYPOKALEMIA: ICD-10-CM

## 2021-04-05 DIAGNOSIS — M16.12 OSTEOARTHRITIS OF LEFT HIP, UNSPECIFIED OSTEOARTHRITIS TYPE: ICD-10-CM

## 2021-04-05 DIAGNOSIS — R26.2 IMPAIRED AMBULATION: ICD-10-CM

## 2021-04-05 DIAGNOSIS — E87.1 HYPONATREMIA: ICD-10-CM

## 2021-04-05 DIAGNOSIS — J44.1 ACUTE EXACERBATION OF CHRONIC OBSTRUCTIVE PULMONARY DISEASE (COPD) (HCC): Primary | ICD-10-CM

## 2021-04-05 PROBLEM — I48.0 PAF (PAROXYSMAL ATRIAL FIBRILLATION) (HCC): Status: ACTIVE | Noted: 2021-04-05

## 2021-04-05 PROBLEM — M25.552 LEFT HIP PAIN: Status: ACTIVE | Noted: 2021-04-05

## 2021-04-05 LAB
ALBUMIN SERPL-MCNC: 3.5 G/DL (ref 3.4–5)
ANION GAP SERPL CALC-SCNC: 9 MMOL/L (ref 3–18)
ARTERIAL PATENCY WRIST A: YES
BASE EXCESS BLD CALC-SCNC: 6 MMOL/L
BASOPHILS # BLD: 0.1 K/UL (ref 0–0.1)
BASOPHILS NFR BLD: 1 % (ref 0–2)
BDY SITE: ABNORMAL
BNP SERPL-MCNC: 55 PG/ML (ref 0–1800)
BODY TEMPERATURE: 98.4
BUN SERPL-MCNC: 24 MG/DL (ref 7–18)
BUN/CREAT SERPL: 19 (ref 12–20)
CALCIUM SERPL-MCNC: 9.3 MG/DL (ref 8.5–10.1)
CHLORIDE SERPL-SCNC: 85 MMOL/L (ref 100–111)
CK MB CFR SERPL CALC: 3.4 % (ref 0–4)
CK MB SERPL-MCNC: 4.6 NG/ML (ref 5–25)
CK SERPL-CCNC: 137 U/L (ref 39–308)
CO2 SERPL-SCNC: 32 MMOL/L (ref 21–32)
COVID-19 RAPID TEST, COVR: NOT DETECTED
CREAT SERPL-MCNC: 1.29 MG/DL (ref 0.6–1.3)
DIFFERENTIAL METHOD BLD: ABNORMAL
EOSINOPHIL # BLD: 0.5 K/UL (ref 0–0.4)
EOSINOPHIL NFR BLD: 4 % (ref 0–5)
ERYTHROCYTE [DISTWIDTH] IN BLOOD BY AUTOMATED COUNT: 13.8 % (ref 11.6–14.5)
GAS FLOW.O2 O2 DELIVERY SYS: ABNORMAL L/MIN
GLUCOSE SERPL-MCNC: 109 MG/DL (ref 74–99)
HCO3 BLD-SCNC: 29.7 MMOL/L (ref 22–26)
HCT VFR BLD AUTO: 35.3 % (ref 36–48)
HGB BLD-MCNC: 12.2 G/DL (ref 13–16)
LYMPHOCYTES # BLD: 1.2 K/UL (ref 0.9–3.6)
LYMPHOCYTES NFR BLD: 9 % (ref 21–52)
MAGNESIUM SERPL-MCNC: 1.9 MG/DL (ref 1.6–2.6)
MCH RBC QN AUTO: 29.3 PG (ref 24–34)
MCHC RBC AUTO-ENTMCNC: 34.6 G/DL (ref 31–37)
MCV RBC AUTO: 84.9 FL (ref 74–97)
MONOCYTES # BLD: 0.8 K/UL (ref 0.05–1.2)
MONOCYTES NFR BLD: 6 % (ref 3–10)
NEUTS SEG # BLD: 10.4 K/UL (ref 1.8–8)
NEUTS SEG NFR BLD: 80 % (ref 40–73)
O2/TOTAL GAS SETTING VFR VENT: 21 %
PCO2 BLD: 40.5 MMHG (ref 35–45)
PH BLD: 7.47 [PH] (ref 7.35–7.45)
PLATELET # BLD AUTO: 391 K/UL (ref 135–420)
PMV BLD AUTO: 8.3 FL (ref 9.2–11.8)
PO2 BLD: 69 MMHG (ref 80–100)
POTASSIUM SERPL-SCNC: 2.8 MMOL/L (ref 3.5–5.5)
RBC # BLD AUTO: 4.16 M/UL (ref 4.7–5.5)
SAO2 % BLD: 95 % (ref 92–97)
SERVICE CMNT-IMP: ABNORMAL
SODIUM SERPL-SCNC: 126 MMOL/L (ref 136–145)
SOURCE, COVRS: NORMAL
SPECIMEN TYPE: ABNORMAL
TROPONIN I SERPL-MCNC: <0.02 NG/ML (ref 0–0.04)
TSH SERPL DL<=0.05 MIU/L-ACNC: 3.06 UIU/ML (ref 0.36–3.74)
WBC # BLD AUTO: 13.1 K/UL (ref 4.6–13.2)

## 2021-04-05 PROCEDURE — 94640 AIRWAY INHALATION TREATMENT: CPT

## 2021-04-05 PROCEDURE — 83735 ASSAY OF MAGNESIUM: CPT

## 2021-04-05 PROCEDURE — 74011250637 HC RX REV CODE- 250/637: Performed by: HOSPITALIST

## 2021-04-05 PROCEDURE — 73560 X-RAY EXAM OF KNEE 1 OR 2: CPT

## 2021-04-05 PROCEDURE — 74011250636 HC RX REV CODE- 250/636: Performed by: EMERGENCY MEDICINE

## 2021-04-05 PROCEDURE — 82803 BLOOD GASES ANY COMBINATION: CPT

## 2021-04-05 PROCEDURE — 36600 WITHDRAWAL OF ARTERIAL BLOOD: CPT

## 2021-04-05 PROCEDURE — 74011250636 HC RX REV CODE- 250/636: Performed by: PHYSICIAN ASSISTANT

## 2021-04-05 PROCEDURE — 84443 ASSAY THYROID STIM HORMONE: CPT

## 2021-04-05 PROCEDURE — 77010033678 HC OXYGEN DAILY

## 2021-04-05 PROCEDURE — 83880 ASSAY OF NATRIURETIC PEPTIDE: CPT

## 2021-04-05 PROCEDURE — 85025 COMPLETE CBC W/AUTO DIFF WBC: CPT

## 2021-04-05 PROCEDURE — 93005 ELECTROCARDIOGRAM TRACING: CPT

## 2021-04-05 PROCEDURE — 99285 EMERGENCY DEPT VISIT HI MDM: CPT

## 2021-04-05 PROCEDURE — 65660000000 HC RM CCU STEPDOWN

## 2021-04-05 PROCEDURE — 82550 ASSAY OF CK (CPK): CPT

## 2021-04-05 PROCEDURE — 74011250636 HC RX REV CODE- 250/636: Performed by: HOSPITALIST

## 2021-04-05 PROCEDURE — 96375 TX/PRO/DX INJ NEW DRUG ADDON: CPT

## 2021-04-05 PROCEDURE — 71045 X-RAY EXAM CHEST 1 VIEW: CPT

## 2021-04-05 PROCEDURE — 74011000250 HC RX REV CODE- 250: Performed by: HOSPITALIST

## 2021-04-05 PROCEDURE — 73502 X-RAY EXAM HIP UNI 2-3 VIEWS: CPT

## 2021-04-05 PROCEDURE — 80048 BASIC METABOLIC PNL TOTAL CA: CPT

## 2021-04-05 PROCEDURE — 87635 SARS-COV-2 COVID-19 AMP PRB: CPT

## 2021-04-05 PROCEDURE — 82040 ASSAY OF SERUM ALBUMIN: CPT

## 2021-04-05 PROCEDURE — 96374 THER/PROPH/DIAG INJ IV PUSH: CPT

## 2021-04-05 PROCEDURE — 93970 EXTREMITY STUDY: CPT

## 2021-04-05 PROCEDURE — 74011000250 HC RX REV CODE- 250: Performed by: PHYSICIAN ASSISTANT

## 2021-04-05 PROCEDURE — 74011250637 HC RX REV CODE- 250/637: Performed by: EMERGENCY MEDICINE

## 2021-04-05 RX ORDER — IPRATROPIUM BROMIDE AND ALBUTEROL SULFATE 2.5; .5 MG/3ML; MG/3ML
3 SOLUTION RESPIRATORY (INHALATION) ONCE
Status: COMPLETED | OUTPATIENT
Start: 2021-04-05 | End: 2021-04-05

## 2021-04-05 RX ORDER — IPRATROPIUM BROMIDE AND ALBUTEROL SULFATE 2.5; .5 MG/3ML; MG/3ML
3 SOLUTION RESPIRATORY (INHALATION)
Status: COMPLETED | OUTPATIENT
Start: 2021-04-05 | End: 2021-04-05

## 2021-04-05 RX ORDER — AZITHROMYCIN 250 MG/1
500 TABLET, FILM COATED ORAL DAILY
Status: DISCONTINUED | OUTPATIENT
Start: 2021-04-06 | End: 2021-04-05

## 2021-04-05 RX ORDER — BUDESONIDE 0.5 MG/2ML
500 INHALANT ORAL
Status: DISCONTINUED | OUTPATIENT
Start: 2021-04-05 | End: 2021-04-14 | Stop reason: HOSPADM

## 2021-04-05 RX ORDER — AMLODIPINE BESYLATE 5 MG/1
5 TABLET ORAL DAILY
Status: DISCONTINUED | OUTPATIENT
Start: 2021-04-06 | End: 2021-04-14 | Stop reason: HOSPADM

## 2021-04-05 RX ORDER — FUROSEMIDE 10 MG/ML
20 INJECTION INTRAMUSCULAR; INTRAVENOUS ONCE
Status: DISCONTINUED | OUTPATIENT
Start: 2021-04-05 | End: 2021-04-05

## 2021-04-05 RX ORDER — IPRATROPIUM BROMIDE AND ALBUTEROL SULFATE 2.5; .5 MG/3ML; MG/3ML
3 SOLUTION RESPIRATORY (INHALATION)
Status: DISCONTINUED | OUTPATIENT
Start: 2021-04-05 | End: 2021-04-14 | Stop reason: HOSPADM

## 2021-04-05 RX ORDER — ENOXAPARIN SODIUM 100 MG/ML
40 INJECTION SUBCUTANEOUS DAILY
Status: DISCONTINUED | OUTPATIENT
Start: 2021-04-06 | End: 2021-04-14 | Stop reason: HOSPADM

## 2021-04-05 RX ORDER — ACETAMINOPHEN 325 MG/1
650 TABLET ORAL
Status: DISCONTINUED | OUTPATIENT
Start: 2021-04-05 | End: 2021-04-09 | Stop reason: SDUPTHER

## 2021-04-05 RX ORDER — POTASSIUM CHLORIDE 20 MEQ/1
40 TABLET, EXTENDED RELEASE ORAL
Status: COMPLETED | OUTPATIENT
Start: 2021-04-05 | End: 2021-04-05

## 2021-04-05 RX ORDER — POTASSIUM CHLORIDE 7.45 MG/ML
10 INJECTION INTRAVENOUS
Status: COMPLETED | OUTPATIENT
Start: 2021-04-05 | End: 2021-04-05

## 2021-04-05 RX ORDER — FAMOTIDINE 20 MG/1
20 TABLET, FILM COATED ORAL 2 TIMES DAILY
Status: DISCONTINUED | OUTPATIENT
Start: 2021-04-05 | End: 2021-04-07

## 2021-04-05 RX ORDER — ARFORMOTEROL TARTRATE 15 UG/2ML
15 SOLUTION RESPIRATORY (INHALATION)
Status: DISCONTINUED | OUTPATIENT
Start: 2021-04-05 | End: 2021-04-14 | Stop reason: HOSPADM

## 2021-04-05 RX ORDER — SODIUM CHLORIDE 0.9 % (FLUSH) 0.9 %
5-40 SYRINGE (ML) INJECTION EVERY 8 HOURS
Status: DISCONTINUED | OUTPATIENT
Start: 2021-04-05 | End: 2021-04-11 | Stop reason: SDUPTHER

## 2021-04-05 RX ORDER — PROMETHAZINE HYDROCHLORIDE 25 MG/1
12.5 TABLET ORAL
Status: DISCONTINUED | OUTPATIENT
Start: 2021-04-05 | End: 2021-04-13 | Stop reason: SDUPTHER

## 2021-04-05 RX ORDER — SODIUM CHLORIDE 0.9 % (FLUSH) 0.9 %
5-40 SYRINGE (ML) INJECTION AS NEEDED
Status: DISCONTINUED | OUTPATIENT
Start: 2021-04-05 | End: 2021-04-11 | Stop reason: SDUPTHER

## 2021-04-05 RX ORDER — AZITHROMYCIN 250 MG/1
500 TABLET, FILM COATED ORAL EVERY 24 HOURS
Status: DISPENSED | OUTPATIENT
Start: 2021-04-05 | End: 2021-04-10

## 2021-04-05 RX ORDER — FACIAL-BODY WIPES
10 EACH TOPICAL DAILY PRN
Status: DISCONTINUED | OUTPATIENT
Start: 2021-04-05 | End: 2021-04-14 | Stop reason: HOSPADM

## 2021-04-05 RX ORDER — FUROSEMIDE 20 MG/1
20 TABLET ORAL DAILY
Status: DISCONTINUED | OUTPATIENT
Start: 2021-04-06 | End: 2021-04-14 | Stop reason: HOSPADM

## 2021-04-05 RX ORDER — DILTIAZEM HYDROCHLORIDE 30 MG/1
30 TABLET, FILM COATED ORAL
Status: DISCONTINUED | OUTPATIENT
Start: 2021-04-05 | End: 2021-04-14 | Stop reason: HOSPADM

## 2021-04-05 RX ORDER — ACETAMINOPHEN 650 MG/1
650 SUPPOSITORY RECTAL
Status: DISCONTINUED | OUTPATIENT
Start: 2021-04-05 | End: 2021-04-14 | Stop reason: HOSPADM

## 2021-04-05 RX ORDER — ONDANSETRON 2 MG/ML
4 INJECTION INTRAMUSCULAR; INTRAVENOUS
Status: DISCONTINUED | OUTPATIENT
Start: 2021-04-05 | End: 2021-04-13 | Stop reason: SDUPTHER

## 2021-04-05 RX ORDER — TAMSULOSIN HYDROCHLORIDE 0.4 MG/1
0.4 CAPSULE ORAL EVERY EVENING
Status: DISCONTINUED | OUTPATIENT
Start: 2021-04-05 | End: 2021-04-14 | Stop reason: HOSPADM

## 2021-04-05 RX ADMIN — POTASSIUM CHLORIDE 10 MEQ: 7.46 INJECTION, SOLUTION INTRAVENOUS at 14:46

## 2021-04-05 RX ADMIN — BUDESONIDE 500 MCG: 0.5 INHALANT RESPIRATORY (INHALATION) at 20:06

## 2021-04-05 RX ADMIN — POTASSIUM BICARBONATE 40 MEQ: 782 TABLET, EFFERVESCENT ORAL at 14:36

## 2021-04-05 RX ADMIN — METHYLPREDNISOLONE SODIUM SUCCINATE 40 MG: 125 INJECTION, POWDER, FOR SOLUTION INTRAMUSCULAR; INTRAVENOUS at 18:03

## 2021-04-05 RX ADMIN — METHYLPREDNISOLONE SODIUM SUCCINATE 125 MG: 125 INJECTION, POWDER, FOR SOLUTION INTRAMUSCULAR; INTRAVENOUS at 14:03

## 2021-04-05 RX ADMIN — CEFTRIAXONE 1 G: 1 INJECTION, POWDER, FOR SOLUTION INTRAMUSCULAR; INTRAVENOUS at 18:03

## 2021-04-05 RX ADMIN — AZITHROMYCIN MONOHYDRATE 500 MG: 250 TABLET ORAL at 18:03

## 2021-04-05 RX ADMIN — TAMSULOSIN HYDROCHLORIDE 0.4 MG: 0.4 CAPSULE ORAL at 18:03

## 2021-04-05 RX ADMIN — POTASSIUM CHLORIDE 40 MEQ: 1500 TABLET, EXTENDED RELEASE ORAL at 18:03

## 2021-04-05 RX ADMIN — DILTIAZEM HYDROCHLORIDE 30 MG: 30 TABLET, FILM COATED ORAL at 18:02

## 2021-04-05 RX ADMIN — Medication 10 ML: at 21:23

## 2021-04-05 RX ADMIN — METHYLPREDNISOLONE SODIUM SUCCINATE 40 MG: 125 INJECTION, POWDER, FOR SOLUTION INTRAMUSCULAR; INTRAVENOUS at 21:23

## 2021-04-05 RX ADMIN — FAMOTIDINE 20 MG: 20 TABLET, FILM COATED ORAL at 21:22

## 2021-04-05 RX ADMIN — IPRATROPIUM BROMIDE AND ALBUTEROL SULFATE 3 ML: .5; 3 SOLUTION RESPIRATORY (INHALATION) at 14:03

## 2021-04-05 RX ADMIN — Medication 10 ML: at 18:03

## 2021-04-05 RX ADMIN — ARFORMOTEROL TARTRATE 15 MCG: 15 SOLUTION RESPIRATORY (INHALATION) at 20:06

## 2021-04-05 RX ADMIN — IPRATROPIUM BROMIDE AND ALBUTEROL SULFATE 3 ML: .5; 3 SOLUTION RESPIRATORY (INHALATION) at 14:35

## 2021-04-05 NOTE — CONSULTS
Consult Note  Consult requested by: Dr Larry Schmitt is a 66 y.o. male WHITE who is being seen on consult for hyponatremia. Chief Complaint   Patient presents with    Shortness of Breath     Admission diagnosis: SOB    HPI:  67 yo PMH HTN, COPD on home O2, P Afib, hx Covid 2/26 s/p J&J Covid vax 3 weeks ago now presenting with SOB. Pt c/o swelling, arthirtis- no N/V, diarrhea, CP, Denies fever. Na here 126.    Past Medical History:   Diagnosis Date    Brain aneurysm     Cancer ([facility] [address]     prostate    COPD (chronic obstructive pulmonary disease) (HCC)     Hypertension     Nocturia     Pneumonia     Radiation effect      Past Surgical History:   Procedure Laterality Date    HX HERNIA REPAIR       Social History     Socioeconomic History    Marital status:      Spouse name: Not on file    Number of children: Not on file    Years of education: Not on file    Highest education level: Not on file   Occupational History    Not on file   Social Needs    Financial resource strain: Not on file    Food insecurity     Worry: Not on file     Inability: Not on file    Transportation needs     Medical: Not on file     Non-medical: Not on file   Tobacco Use    Smoking status: Former Smoker     Types: Cigarettes    Smokeless tobacco: Never Used   Substance and Sexual Activity    Alcohol use: No    Drug use: Never    Sexual activity: Not on file   Lifestyle    Physical activity     Days per week: Not on file     Minutes per session: Not on file    Stress: Not on file   Relationships    Social connections     Talks on phone: Not on file     Gets together: Not on file     Attends Jew service: Not on file     Active member of club or organization: Not on file     Attends meetings of clubs or organizations: Not on file     Relationship status: Not on file    Intimate partner violence     Fear of current or ex partner: Not on file     Emotionally abused: Not on file     Physically abused: Not on file     Forced sexual activity: Not on file   Other Topics Concern    Not on file   Social History Narrative    Not on file       Family Hx:no hx kidney disease  Allergies   Allergen Reactions    Aspirin Other (comments)     \"messes my stomach up\"       Home Medications:     reviewed  Review of Systems:       Visit Vitals  BP (!) 163/69   Pulse 85   Temp 97.5 °F (36.4 °C)   Resp 22   Ht 5' 7\" (1.702 m)   Wt 104.8 kg (231 lb)   SpO2 97%   BMI 36.18 kg/m²     Constitutional: Negative for fever or chills. HEENT- [place], no headache, blurry vision  Respiratory: Positive for shortness of breath. Cardiovascular: Negative for chest pain. Gastrointestinal: Negative for abdominal pain. Musculoskeletal: Positive for arthralgias and myalgias. Skin- no rash  Neuro- No seizures, numbness  Psych- no depression        Physical Assessment:     Wt Readings from Last 3 Encounters:   04/05/21 104.8 kg (231 lb)   03/01/21 94.2 kg (207 lb 10.8 oz)   02/02/21 100.2 kg (221 lb)     Temp Readings from Last 3 Encounters:   04/05/21 97.5 °F (36.4 °C)   03/03/21 98.3 °F (36.8 °C)   02/07/21 99.3 °F (37.4 °C)     BP Readings from Last 3 Encounters:   04/05/21 (!) 163/69   03/03/21 138/67   02/07/21 (!) 172/77     Pulse Readings from Last 3 Encounters:   04/05/21 85   03/03/21 71   02/07/21 82   CONSTITUTIONAL: Alert. Chronically ill-appearing elderly male, mildly tachypneic in mild respiratory distress with audible wheezes and occasional congested cough  HEAD: Normocephalic; atraumatic. EYES: PERRL; EOM's intact. No nystagmus. Conjunctiva clear. ENT: TM's normal. External ear normal. Normal nose; no rhinorrhea. Normal pharynx. Tonsils not enlarged without exudate. Moist mucus membranes. NECK: Supple; FROM without difficulty, non-tender; no cervical lymphadenopathy. CV: Normal S1, S2; no murmurs, rubs, or gallops. No chest wall tenderness.   RESPIRATORY: Mildly tachypneic with expiratory wheezes bilaterally, mild crackles at the bases. GI: Non-distended; non-tender. BACK:  No evidence of trauma or deformity. Non-tender to palpation. EXT: LLE: Hip and knee are slightly flexed and externally rotated, decreased range of motion due to pain. Sensation intact. 2+ pitting edema bilateral lower extremities with mild erythema around the ankles. 2+ DP pulses. RLE: nontender. SKIN: Normal for age and race; warm; dry; good turgor; no apparent lesions or exudate. NEURO: A & O x3. PSYCH:  Mood and affect appropriate. WBC 13.1 H/H 12.2/35. 3 plt 391  Na 126 K 2.8 Cl 85 Co2 32 BUN/Cr 24/1.2     CXR-  Improved aeration with mild, less evident ill-defined opacity in the right  infrahilar region, which can reflect chronic atelectasis or infiltrate.  No  additional change  Impression:   Hyponatremia, hypervolemic  COPD  Hypokalemia  HTN  P Afib  Arthiritis        Plan:   Replete K  PRASANNA, Sosm  Lasix  Monitor labs    Ana Bustos MD  W- [phone]  [phone]

## 2021-04-05 NOTE — PROGRESS NOTES
Exam on hold until clarification of Brain Aneurysm. Also, concern patient unable to lie flat for scan when cleared for machine. Nurse aware and will work on getting the questions answered. Will attempt in AM if all safety issues are cleared.    Marven Gosselin  Lead MRI

## 2021-04-05 NOTE — Clinical Note
Status[de-identified] INPATIENT [101]   Type of Bed: Telemetry [19]   Inpatient Hospitalization Certified Necessary for the Following Reasons: 3.  Patient receiving treatment that can only be provided in an inpatient setting (further clarification in H&P documentation)   Admitting Diagnosis: Acute exacerbation of chronic obstructive pulmonary disease (COPD) [facility]) [[id number]]   Admitting Physician: Mikhail Rodriguez   Attending Physician: Yolande Delcid [[id number]]   Estimated Length of Stay: 3-4 Midnights   Discharge Plan[de-identified] Home with Office Follow-up

## 2021-04-05 NOTE — H&P
History & Physical    Patient: Cici Lutz MRN: [id number]  CSN: [id number]    YOB: 1945  Age: 66 y.o.   Sex: male      DOA: 4/5/2021  Primary Care Provider:  Josephine Lynn MD      Assessment/Plan     Hospital Problems  Date Reviewed: 2/27/2021          Codes Class Noted POA    Left hip pain ICD-10-CM: M25.552  ICD-9-CM: 719.45  4/5/2021 Unknown        PAF (paroxysmal atrial fibrillation) (HCC) ICD-10-CM: I48.0  ICD-9-CM: 427.31  4/5/2021 Unknown        Hypokalemia ICD-10-CM: E87.6  ICD-9-CM: 276.8  4/14/2020 Yes        Hyponatremia ICD-10-CM: E87.1  ICD-9-CM: 276.1  4/10/2020 Yes        Debility ICD-10-CM: R53.81  ICD-9-CM: 799.3  7/8/2019 Yes        * (Principal) Acute exacerbation of chronic obstructive pulmonary disease (COPD) ([facility] ICD-10-CM: J44.1  ICD-9-CM: 491.21  2/8/2019 Unknown        Asbestosis ([facility] ICD-10-CM: J55  ICD-9-CM: 045  10/19/2018 Yes        Hypertension ICD-10-CM: L98  ICD-9-CM: 401.9  Unknown Yes              Patient was admitted due to hyponatremia and hypokalemia and COPD exacerbation.       Acute COPD exacerbation  Iv steroid, breathing tx, empiric abx   Rapid covid 19 undetected      Asbestosis and chronic respiration failure with hypoxia   On home O2 2 L   Continue   Will have abg stat      paf   Continue cardizem      Hypertension: On Cardizem      Hypokalemia and hyponatremia   K replacement,   Case discussed with Dr. Reyna La -sodium Na -hypervolemic -lasix per renal   Continue monitoring     Fluid overloaded-leg swelling   dvt negative  Lasix per renal   Echo ef 60-78 % mild systolic dysfunction-done in Feb, 2021    Hearing loss   Having hearing aid -like not work well   communication board     Hip pain   X-ray reviewed, will have mri -r/o avn       Multiple admissions -palliative care consult     Estimate  length of stay : 2-3 day    DVT : heparin ppi proph  CC: hip pain and sob        HPI:     Cici Lutz is a 66 y.o. male with history of PAF, COPD,asbetosis, chronic respiratory failure with hypoxia 2 L came to ER due to hip pain and sob. He reported that he has pain in his left hip and knee with difficult ambulation for 2-3 weeks. He used O2 at home, reported sob worsening for 1-2 weeks. He received iv steroid and breathing tx in ER, x-ray of hip/chest/knee no acute process. He was found to sodium 126 potassium 2.8. He received 40 M EQ p.o. potassium and 10 M EQ potassium per IV. He is hard hearing with hearing aid. He reported leg swelling for months. Visit Vitals  /73   Pulse 89   Temp 98.4 °F (36.9 °C)   Resp 22   Ht 5' 7\" (1.702 m)   Wt 104.8 kg (231 lb)   SpO2 95%   BMI 36.18 kg/m²    O2 Flow Rate (L/min): 2 l/min O2 Device: Nasal cannula      Past Medical History:   Diagnosis Date    Brain aneurysm     Cancer (HCC)     prostate    COPD (chronic obstructive pulmonary disease) (HCC)     Hypertension     Nocturia     Pneumonia     Radiation effect        Past Surgical History:   Procedure Laterality Date    HX HERNIA REPAIR         Family History   Problem Relation Age of Onset    Heart Disease Mother        Social History     Socioeconomic History    Marital status:      Spouse name: Not on file    Number of children: Not on file    Years of education: Not on file    Highest education level: Not on file   Tobacco Use    Smoking status: Former Smoker     Types: Cigarettes    Smokeless tobacco: Never Used   Substance and Sexual Activity    Alcohol use: No    Drug use: Never       Prior to Admission medications    Medication Sig Start Date End Date Taking? Authorizing Provider   predniSONE (STERAPRED DS) 10 mg dose pack Take 6 tablets p.o. daily x1 day, take 5 tablets p.o. daily x1 day, take 4 tablets p.o. daily x1 day, take 3 tabs p.o. daily x1 day, take 2 tablets p.o. daily x1 day, and take 1 tablet p.o. daily x1 day. 3/2/21   Lety Zapata MD   amLODIPine (NORVASC) 5 mg tablet Take 1 Tab by mouth daily.  3/3/21 Mireya Martínez MD   budesonide (PULMICORT) 0.5 mg/2 mL nbsp 2 mL by Nebulization route two (2) times a day. 2/7/21   Anai Jordan MD   albuterol-ipratropium (DUO-NEB) 2.5 mg-0.5 mg/3 ml nebu 3 mL by Nebulization route every four (4) hours as needed for Wheezing. 2/7/21   Anai Jordan MD   albuterol (PROVENTIL HFA, VENTOLIN HFA, PROAIR HFA) 90 mcg/actuation inhaler Take 2 Puffs by inhalation every four (4) hours as needed for Wheezing or Shortness of Breath. 2/7/21   Anai Jordan MD   arformoteroL (BROVANA) 15 mcg/2 mL nebu neb solution 2 mL by Nebulization route two (2) times a day. 2/7/21   Anai Jordan MD   OXYGEN-AIR DELIVERY SYSTEMS 2 L/min by Nasal route continuous. Provider, Historical   furosemide (Lasix) 20 mg tablet Take 20 mg by mouth daily. Provider, Historical   dilTIAZem (CARDIZEM) 30 mg tablet Take 1 Tab by mouth Before breakfast, lunch, and dinner. Patient taking differently: Take 30 mg by mouth daily. Daily 4/14/20   Brittney Zhang MD   acetaminophen (TYLENOL) 325 mg tablet Take 650 mg by mouth every eight (8) hours as needed for Pain. Provider, Historical   dextran 70/hypromellose (ARTIFICIAL TEARS, PF, OP) Apply 2 Drops to eye as needed for Other (both eyes for dryness). Provider, Historical   diphenhydrAMINE (BENADRYL) 25 mg capsule Take 25 mg by mouth nightly as needed for Itching. Provider, Historical   tamsulosin (FLOMAX) 0.4 mg capsule Take 0.4 mg by mouth every evening. Shant, MD Blayne       Allergies   Allergen Reactions    Aspirin Other (comments)     \"messes my stomach up\"       Review of Systems  Gen: No fever, chills, malaise, weight loss/gain. Heent: No headache, rhinorrhea, epistaxis, ear pain, hearing loss, sinus pain, neck pain/stiffness, sore throat. Heart: No chest pain, palpitations, KUMAR, pnd, or orthopnea. Resp: No cough, hemoptysis,  +wheezing and  +shortness of breath.    GI: No nausea, vomiting, diarrhea, constipation, melena or hematochezia. : No urinary obstruction, dysuria or hematuria. Derm: No rash, new skin lesion or pruritis. Musc/skeletal: left hip pain and knee pain   Vasc: +edema-leg ,  No cyanosis or claudication. Endo: No heat/cold intolerance, no polyuria,polydipsia or polyphagia. Neuro: No unilateral weakness, numbness, tingling. No seizures. Heme: No easy bruising or bleeding. Physical Exam:     Physical Exam:  Visit Vitals  /73   Pulse 89   Temp 98.4 °F (36.9 °C)   Resp 22   Ht 5' 7\" (1.702 m)   Wt 104.8 kg (231 lb)   SpO2 95%   BMI 36.18 kg/m²    O2 Flow Rate (L/min): 2 l/min O2 Device: Nasal cannula    Temp (24hrs), Av °F (36.7 °C), Min:97.5 °F (36.4 °C), Max:98.4 °F (36.9 °C)    No intake/output data recorded. No intake/output data recorded. General:  Awake, cooperative, no distress. Head:  Normocephalic, without obvious abnormality, atraumatic. Eyes:  Conjunctivae/corneas clear, sclera anicteric, PERRL, EOMs intact. Nose: Nares normal. No drainage or sinus tenderness. Throat: Lips, mucosa, and tongue normal. .   Neck: Supple, symmetrical, trachea midline, no adenopathy. Lungs:   Clear to auscultation bilaterally. Heart:  Regular rate and rhythm, S1, S2 normal, no murmur, click, rub or gallop. Abdomen: Soft, non-tender. Bowel sounds normal. No masses,  No organomegaly. Extremities: Extremities normal, atraumatic, no cyanosis. + edema. Left hip tenderness laterally, rom mild limited    Pulses: 2+ and symmetric all extremities. Skin: Skin color-pink, texture, turgor normal. No rashes or lesions. Capillary refill normal    Neurologic: CNII-XII intact. No focal motor or sensory deficit.  Hard hearing        Labs Reviewed:    BMP:   Lab Results   Component Value Date/Time     (L) 2021 01:30 PM    K 2.8 (LL) 2021 01:30 PM    CL 85 (L) 2021 01:30 PM    CO2 32 2021 01:30 PM    AGAP 9 2021 01:30 PM    GLU 109 (H) 04/05/2021 01:30 PM    BUN 24 (H) 04/05/2021 01:30 PM    CREA 1.29 04/05/2021 01:30 PM    GFRAA >60 04/05/2021 01:30 PM    GFRNA 54 (L) 04/05/2021 01:30 PM     CMP:   Lab Results   Component Value Date/Time     (L) 04/05/2021 01:30 PM    K 2.8 (LL) 04/05/2021 01:30 PM    CL 85 (L) 04/05/2021 01:30 PM    CO2 32 04/05/2021 01:30 PM    AGAP 9 04/05/2021 01:30 PM     (H) 04/05/2021 01:30 PM    BUN 24 (H) 04/05/2021 01:30 PM    CREA 1.29 04/05/2021 01:30 PM    GFRAA >60 04/05/2021 01:30 PM    GFRNA 54 (L) 04/05/2021 01:30 PM    CA 9.3 04/05/2021 01:30 PM    MG 1.9 04/05/2021 01:30 PM     CBC:   Lab Results   Component Value Date/Time    WBC 13.1 04/05/2021 01:30 PM    HGB 12.2 (L) 04/05/2021 01:30 PM    HCT 35.3 (L) 04/05/2021 01:30 PM     04/05/2021 01:30 PM     All Cardiac Markers in the last 24 hours:   Lab Results   Component Value Date/Time     04/05/2021 01:30 PM    CKMB 4.6 (H) 04/05/2021 01:30 PM    CKND1 3.4 04/05/2021 01:30 PM    TROIQ <0.02 04/05/2021 01:30 PM     Recent Glucose Results:   Lab Results   Component Value Date/Time     (H) 04/05/2021 01:30 PM     ABG:   Lab Results   Component Value Date/Time    PHI 7.47 (H) 04/05/2021 05:31 PM    PCO2I 40.5 04/05/2021 05:31 PM    PO2I 69 (L) 04/05/2021 05:31 PM    HCO3I 29.7 (H) 04/05/2021 05:31 PM    FIO2I 21 04/05/2021 05:31 PM     COAGS: No results found for: APTT, PTP, INR, INREXT, INREXT  Liver Panel: No results found for: ALB, CBIL, TBIL, TP, GLOB, AGRAT, ASTPOC, ALTPOC, ALT, AP  Pancreatic Markers: No results found for: AMYLPOCT, AML, LIPPOCT, LPSE    Xr Hip Lt W Or Wo Pelv 2-3 Vws    Result Date: 4/5/2021  EXAM: LEFT HIP RADIOGRAPHS CLINICAL INDICATION/HISTORY: left hip pain, difficulty ambulating -Additional: None COMPARISON: Supine with 2/26/2021 TECHNIQUE: AP pelvis and frog-leg lateral view of left hip. _______________ FINDINGS: BONES: Severe left hip osteoarthrosis with femoral head remodeling and collapse. No evidence of acute displaced fracture or dislocation. SOFT TISSUES: Unremarkable. _______________     Severe left hip osteoarthrosis with femoral head collapse/remodeling. Sclerosis and lucency in femoral head may reflect collapse/remodeling versus AVN. Consider MRI in the appropriate clinical setting. No evidence of acute fracture or dislocation. Xr Knee Lt Max 2 Vws    Result Date: 4/5/2021  HISTORY: -Provided on order: pain, difficulty ambulating -Additional: None Technique: 2 views of left knee are presented for interpretation. Comparison study: none. Findings: The bones are osteopenic. There is no plain film evident fracture or dislocation. No radiopaque foreign body or significant arthropathy. No significant arthropathy. No plain film evident knee joint effusion is seen. 1. No acute bony abnormality is identified by plain films. Intrinsic knee ligamentous, meniscal and cartilaginous integrity can be best evaluated by routine knee MRI if clinically warranted. Xr Chest Port    Result Date: 4/5/2021  HISTORY: -Provided with order: SOB -Additional: Bilateral lower extremity swelling. Technique : AP PORTABLE CHEST Comparison : 02/28/21 FINDINGS: Patient is rotated to the right. The chin obscures the upper thorax. There is mild motion degradation and external artifact which additionally limits visibility. HEART AND MEDIASTINUM: Unremarkable. LUNGS AND PLEURAL SPACES: Calcified pleural plaques present on the right. Vague opacity in the right infrahilar region is less evident than the prior, but degraded by motion. There is stable elevation of the right diaphragm. BONY THORAX AND SOFT TISSUES: No acute osseous abnormality. Improved aeration with mild, less evident ill-defined opacity in the right infrahilar region, which can reflect chronic atelectasis or infiltrate. No additional change.      Duplex Lower Ext Venous Bilat    Result Date: 4/5/2021  · No evidence of deep vein thrombosis in the right lower extremity veins assessed. · No evidence of deep vein thrombosis in the left lower extremity veins assessed.       Procedures/imaging: see electronic medical records for all procedures/Xrays and details which were not copied into this note but were reviewed prior to creation of Jose Eduardo Schultz MD, Internal Medicine     CC: Mauro Olivarez MD

## 2021-04-05 NOTE — ED PROVIDER NOTES
EMERGENCY DEPARTMENT HISTORY AND PHYSICAL EXAM    Date: 4/5/2021  Patient Name: Terrell Lockhart    History of Presenting Illness     Chief Complaint   Patient presents with    Shortness of Breath         History Provided By: Patient and EMS    1:20 PM  Terrell Lockhart is a 66 y.o. male with PMHX of hypertension, COPD, paroxysmal A. fib who presents to the emergency department via [facility] transport from home C/O left hip and knee pain and difficulty ambulating. Eyes any injury trauma or fall. Patient also reports chronic lower extremity swelling and shortness of breath which is a little worse over the past 1 to 2 weeks. He is on oxygen by nasal cannula chronically, 2 L. Most recent hospitalization 2/26/2021 for COPD exacerbation requiring intubation. Received [employer] COVID-19 vaccine 3 weeks ago. Pt denies fever, worsening cough, chest pain, abdominal pain, and any other sxs or complaints. PCP: Marleny Houser MD    Current Facility-Administered Medications   Medication Dose Route Frequency Provider Last Rate Last Admin    [START ON 4/6/2021] amLODIPine (NORVASC) tablet 5 mg  5 mg Oral DAILY Roro Viera MD         Current Outpatient Medications   Medication Sig Dispense Refill    predniSONE (STERAPRED DS) 10 mg dose pack Take 6 tablets p.o. daily x1 day, take 5 tablets p.o. daily x1 day, take 4 tablets p.o. daily x1 day, take 3 tabs p.o. daily x1 day, take 2 tablets p.o. daily x1 day, and take 1 tablet p.o. daily x1 day. 21 Tab 0    amLODIPine (NORVASC) 5 mg tablet Take 1 Tab by mouth daily. 30 Tab 0    budesonide (PULMICORT) 0.5 mg/2 mL nbsp 2 mL by Nebulization route two (2) times a day. 60 mL 0    albuterol-ipratropium (DUO-NEB) 2.5 mg-0.5 mg/3 ml nebu 3 mL by Nebulization route every four (4) hours as needed for Wheezing.  30 Nebule 0    albuterol (PROVENTIL HFA, VENTOLIN HFA, PROAIR HFA) 90 mcg/actuation inhaler Take 2 Puffs by inhalation every four (4) hours as needed for Wheezing or Shortness of Breath. 1 Inhaler 1    arformoteroL (BROVANA) 15 mcg/2 mL nebu neb solution 2 mL by Nebulization route two (2) times a day. 60 Vial 0    OXYGEN-AIR DELIVERY SYSTEMS 2 L/min by Nasal route continuous.  furosemide (Lasix) 20 mg tablet Take 20 mg by mouth daily.  dilTIAZem (CARDIZEM) 30 mg tablet Take 1 Tab by mouth Before breakfast, lunch, and dinner. (Patient taking differently: Take 30 mg by mouth daily. Daily) 90 Tab 0    acetaminophen (TYLENOL) 325 mg tablet Take 650 mg by mouth every eight (8) hours as needed for Pain.  dextran 70/hypromellose (ARTIFICIAL TEARS, PF, OP) Apply 2 Drops to eye as needed for Other (both eyes for dryness).  diphenhydrAMINE (BENADRYL) 25 mg capsule Take 25 mg by mouth nightly as needed for Itching.  tamsulosin (FLOMAX) 0.4 mg capsule Take 0.4 mg by mouth every evening. Past History     Past Medical History:  Past Medical History:   Diagnosis Date    Brain aneurysm     Cancer ([facility] [address]     prostate    COPD (chronic obstructive pulmonary disease) ([facility] [address]     Hypertension     Nocturia     Pneumonia     Radiation effect        Past Surgical History:  Past Surgical History:   Procedure Laterality Date    HX HERNIA REPAIR         Family History:  Family History   Problem Relation Age of Onset    Heart Disease Mother        Social History:  Social History     Tobacco Use    Smoking status: Former Smoker     Types: Cigarettes    Smokeless tobacco: Never Used   Substance Use Topics    Alcohol use: No    Drug use: Never       Allergies: Allergies   Allergen Reactions    Aspirin Other (comments)     \"messes my stomach up\"         Review of Systems   Review of Systems   Constitutional: Negative. Negative for fever. Respiratory: Positive for shortness of breath. Cardiovascular: Negative for chest pain. Gastrointestinal: Negative for abdominal pain. Musculoskeletal: Positive for arthralgias and myalgias.    All other systems reviewed and are negative. Physical Exam     Vitals:    04/05/21 1445 04/05/21 1500 04/05/21 1515 04/05/21 1530   BP: (!) 142/70 (!) 180/81 (!) 168/73 (!) 163/69   Pulse: 84 95 90 85   Resp: 20 23 30 22   Temp:       SpO2: 100% 100% 98% 97%   Weight:       Height:         Physical Exam    Vital signs and nursing notes reviewed. CONSTITUTIONAL: Alert. Chronically ill-appearing elderly male, mildly tachypneic in mild respiratory distress with audible wheezes and occasional congested cough  HEAD: Normocephalic; atraumatic. EYES: PERRL; EOM's intact. No nystagmus. Conjunctiva clear. ENT: TM's normal. External ear normal. Normal nose; no rhinorrhea. Normal pharynx. Tonsils not enlarged without exudate. Moist mucus membranes. NECK: Supple; FROM without difficulty, non-tender; no cervical lymphadenopathy. CV: Normal S1, S2; no murmurs, rubs, or gallops. No chest wall tenderness. RESPIRATORY: Mildly tachypneic with expiratory wheezes bilaterally, mild crackles at the bases. GI: Non-distended; non-tender. BACK:  No evidence of trauma or deformity. Non-tender to palpation. EXT: LLE: Hip and knee are slightly flexed and externally rotated, decreased range of motion due to pain. Sensation intact. 2+ pitting edema bilateral lower extremities with mild erythema around the ankles. 2+ DP pulses. RLE: nontender. SKIN: Normal for age and race; warm; dry; good turgor; no apparent lesions or exudate. NEURO: A & O x3. PSYCH:  Mood and affect appropriate.          Diagnostic Study Results     Labs -     Recent Results (from the past 12 hour(s))   EKG, 12 LEAD, INITIAL    Collection Time: 04/05/21  1:22 PM   Result Value Ref Range    Ventricular Rate 89 BPM    Atrial Rate 89 BPM    P-R Interval 170 ms    QRS Duration 90 ms    Q-T Interval 386 ms    QTC Calculation (Bezet) 469 ms    Calculated P Axis 42 degrees    Calculated T Axis 30 degrees    Diagnosis       Normal sinus rhythm  Normal ECG  When compared with ECG of 27-FEB-2021 00:51,  Sinus rhythm has replaced Atrial fibrillation  Nonspecific T wave abnormality no longer evident in Lateral leads     CBC WITH AUTOMATED DIFF    Collection Time: 04/05/21  1:30 PM   Result Value Ref Range    WBC 13.1 4.6 - 13.2 K/uL    RBC 4.16 (L) 4.70 - 5.50 M/uL    HGB 12.2 (L) 13.0 - 16.0 g/dL    HCT 35.3 (L) 36.0 - 48.0 %    MCV 84.9 74.0 - 97.0 FL    MCH 29.3 24.0 - 34.0 PG    MCHC 34.6 31.0 - 37.0 g/dL    RDW 13.8 11.6 - 14.5 %    PLATELET 433 854 - 300 K/uL    MPV 8.3 (L) 9.2 - 11.8 FL    NEUTROPHILS 80 (H) 40 - 73 %    LYMPHOCYTES 9 (L) 21 - 52 %    MONOCYTES 6 3 - 10 %    EOSINOPHILS 4 0 - 5 %    BASOPHILS 1 0 - 2 %    ABS. NEUTROPHILS 10.4 (H) 1.8 - 8.0 K/UL    ABS. LYMPHOCYTES 1.2 0.9 - 3.6 K/UL    ABS. MONOCYTES 0.8 0.05 - 1.2 K/UL    ABS. EOSINOPHILS 0.5 (H) 0.0 - 0.4 K/UL    ABS.  BASOPHILS 0.1 0.0 - 0.1 K/UL    DF AUTOMATED     METABOLIC PANEL, BASIC    Collection Time: 04/05/21  1:30 PM   Result Value Ref Range    Sodium 126 (L) 136 - 145 mmol/L    Potassium 2.8 (LL) 3.5 - 5.5 mmol/L    Chloride 85 (L) 100 - 111 mmol/L    CO2 32 21 - 32 mmol/L    Anion gap 9 3.0 - 18 mmol/L    Glucose 109 (H) 74 - 99 mg/dL    BUN 24 (H) 7.0 - 18 MG/DL    Creatinine 1.29 0.6 - 1.3 MG/DL    BUN/Creatinine ratio 19 12 - 20      GFR est AA >60 >60 ml/min/1.73m2    GFR est non-AA 54 (L) >60 ml/min/1.73m2    Calcium 9.3 8.5 - 10.1 MG/DL   CARDIAC PANEL,(CK, CKMB & TROPONIN)    Collection Time: 04/05/21  1:30 PM   Result Value Ref Range    CK - MB 4.6 (H) <3.6 ng/ml    CK-MB Index 3.4 0.0 - 4.0 %     39 - 308 U/L    Troponin-I, QT <0.02 0.0 - 0.045 NG/ML   NT-PRO BNP    Collection Time: 04/05/21  1:30 PM   Result Value Ref Range    NT pro-BNP 55 0 - 1,800 PG/ML   MAGNESIUM    Collection Time: 04/05/21  1:30 PM   Result Value Ref Range    Magnesium 1.9 1.6 - 2.6 mg/dL       Radiologic Studies -   XR CHEST PORT   Final Result      Improved aeration with mild, less evident ill-defined opacity in the right   infrahilar region, which can reflect chronic atelectasis or infiltrate. No   additional change. XR HIP LT W OR WO PELV 2-3 VWS   Final Result      Severe left hip osteoarthrosis with femoral head collapse/remodeling. Sclerosis   and lucency in femoral head may reflect collapse/remodeling versus AVN. Consider MRI in the appropriate clinical setting. No evidence of acute fracture or dislocation. XR KNEE LT MAX 2 VWS   Final Result      1. No acute bony abnormality is identified by plain films. Intrinsic knee ligamentous, meniscal and cartilaginous integrity can be best   evaluated by routine knee MRI if clinically warranted. CT Results  (Last 48 hours)    None        CXR Results  (Last 48 hours)               04/05/21 1514  XR CHEST PORT Final result    Impression:      Improved aeration with mild, less evident ill-defined opacity in the right   infrahilar region, which can reflect chronic atelectasis or infiltrate. No   additional change. Narrative:  HISTORY:    -Provided with order: SOB   -Additional: Bilateral lower extremity swelling. Technique : AP PORTABLE CHEST       Comparison : 02/28/21        FINDINGS: Patient is rotated to the right. The chin obscures the upper thorax. There is mild motion degradation and external artifact which additionally limits   visibility. HEART AND MEDIASTINUM: Unremarkable. LUNGS AND PLEURAL SPACES: Calcified pleural plaques present on the right. Vague   opacity in the right infrahilar region is less evident than the prior, but   degraded by motion. There is stable elevation of the right diaphragm. BONY THORAX AND SOFT TISSUES: No acute osseous abnormality.                  Medications given in the ED-  Medications   amLODIPine (NORVASC) tablet 5 mg (has no administration in time range)   albuterol-ipratropium (DUO-NEB) 2.5 MG-0.5 MG/3 ML (3 mL Nebulization Given 4/5/21 8429) methylPREDNISolone (PF) (Solu-MEDROL) injection 125 mg (125 mg IntraVENous Given 4/5/21 1403)   potassium bicarb-citric acid (EFFER-K) tablet 40 mEq (40 mEq Oral Given 4/5/21 1436)   potassium chloride 10 mEq in 100 ml IVPB (10 mEq IntraVENous New Bag 4/5/21 1446)   albuterol-ipratropium (DUO-NEB) 2.5 MG-0.5 MG/3 ML (3 mL Nebulization Given 4/5/21 1435)         Medical Decision Making   I am the first provider for this patient. I reviewed the vital signs, available nursing notes, past medical history, past surgical history, family history and social history. Vital Signs-Reviewed the patient's vital signs. Pulse Oximetry Analysis - 100% on RA     Cardiac Monitor:  Rate: 92 bpm  Rhythm: NSR    EKG interpretation: (Preliminary)  Normal sinus rhythm, rate 89, no STEMI or acute changes    Records Reviewed: Nursing Notes, Old Medical Records, Previous Radiology Studies and Previous Laboratory Studies      Procedures:  Procedures    ED Course:  1:20 PM   Initial assessment performed. The patients presenting problems have been discussed, and they are in agreement with the care plan formulated and outlined with them. I have encouraged them to ask questions as they arise throughout their visit. FACE-TO-FACE PROGRESS NOTE:  2:30 PM  The patient presents with shortness of breath and left lower extremity pain. Per patient he cannot bear weight on the left lower extremity but denies any injury. He does hold the leg flexed and externally rotated and has some edema. Will require x-rays and Doppler of the lower extremity to further evaluate. Patient is short of breath and wheezing and has history of COPD. Will treat as COPD exacerbation. Labs revealed critically low potassium. Will begin repletion with IV and p.o. potassium. Patient will require further inpatient management. .  I have personally seen and examined the patient, reviewed the ILIA's note and agree with findings and plan.   Written by Dr. Jose Santacruz Anest       3:15 PM progress note  Patient felt better after breathing treatments, in no respiratory distress and O2 sats 97% on his normal 2 L of oxygen. Will admit for mild COPD exacerbation as well as hyponatremia and hypokalemia. He also has pain with movement of his left lower extremity but left lower extremity duplex negative for DVT and x-rays of the left hip and knee show severe degenerative changes but no acute abnormalities or fracture. 3:25 PM consult note  Case discussed with hospitalist Dr. Jaquelin Edwards, who will admit to telemetry floor. She does request a rapid Covid test and an ABG. Diagnosis and Disposition       4:00 PM  I have spent 30 minutes of critical care time involved in lab review, consultations with specialist, family decision-making, and documentation. During this entire length of time I was immediately available to the patient. Critical Care: The reason for providing this level of medical care for this critically ill patient was due a critical illness that impaired one or more vital organ systems such that there was a high probability of imminent or life threatening deterioration in the patients condition. This care involved high complexity decision making to assess, manipulate, and support vital system functions, to treat this degreee vital organ system failure and to prevent further life threatening deterioration of the patients condition. ADMISSION NOTE:  3:29 PM  Patient is being admitted to the hospital by Dr. Jaquelin Edwards. The results of their tests and reasons for their admission have been discussed with them and/or available family. They convey agreement and understanding for the need to be admitted and for their admission diagnosis. CONDITIONS ON ADMISSION:  Deep Vein Thrombosis is not present at the time of admission. Thrombosis is not present at the time of admission. Urinary Tract Infection is not present at the time of admission.  Pneumonia is not present at the time of admission. MRSA is not present at the time of admission. Wound infection is not present at the time of admission. Pressure Ulcer is not present at the time of admission. CLINICAL IMPRESSION:    1. Acute exacerbation of chronic obstructive pulmonary disease (COPD) ([address]    2. Hyponatremia    3. Hypokalemia    4. Osteoarthritis of left hip, unspecified osteoarthritis type    5. Impaired ambulation        PLAN:      1. Admit      Please note that this dictation was completed with [employer], the computer voice recognition software. Quite often unanticipated grammatical, syntax, homophones, and other interpretive errors are inadvertently transcribed by the computer software. Please disregard these errors. Please excuse any errors that have escaped final proofreading.

## 2021-04-05 NOTE — PROGRESS NOTES
1735 TRANSFER - IN REPORT:    Verbal report received from [address] (name) on [facility]  being received from ED (unit) for routine progression of care      Report consisted of patients Situation, Background, Assessment and   Recommendations(SBAR). Information from the following report(s) SBAR, Kardex, [facility] and Cardiac Rhythm SR was reviewed with the receiving nurse. Opportunity for questions and clarification was provided. Assessment completed upon patients arrival to unit and care assumed. 1800 Pt arrived on the unit. Care assumed. 1924 Bedside and Verbal shift change report given to Genevieve Brice RN (oncoming nurse) by Rj Peterson RN (offgoing nurse). Report included the following information SBAR, Kardex, MAR, Recent Results and Cardiac Rhythm .

## 2021-04-06 ENCOUNTER — TRANSCRIBE ORDER (OUTPATIENT)
Dept: SCHEDULING | Age: 76
End: 2021-04-06

## 2021-04-06 ENCOUNTER — APPOINTMENT (OUTPATIENT)
Dept: CT IMAGING | Age: 76
DRG: 469 | End: 2021-04-06
Attending: HOSPITALIST
Payer: MEDICARE

## 2021-04-06 DIAGNOSIS — F17.211 NICOTINE DEPENDENCE, CIGARETTES, IN REMISSION: Primary | ICD-10-CM

## 2021-04-06 DIAGNOSIS — Z12.2 ENCOUNTER FOR SCREENING FOR MALIGNANT NEOPLASM OF RESPIRATORY ORGANS: ICD-10-CM

## 2021-04-06 LAB
ANION GAP SERPL CALC-SCNC: 10 MMOL/L (ref 3–18)
ATRIAL RATE: 89 BPM
BASOPHILS # BLD: 0 K/UL (ref 0–0.1)
BASOPHILS NFR BLD: 0 % (ref 0–2)
BUN SERPL-MCNC: 31 MG/DL (ref 7–18)
BUN/CREAT SERPL: 21 (ref 12–20)
CALCIUM SERPL-MCNC: 8.7 MG/DL (ref 8.5–10.1)
CALCULATED P AXIS, ECG09: 42 DEGREES
CALCULATED T AXIS, ECG11: 30 DEGREES
CHLORIDE SERPL-SCNC: 89 MMOL/L (ref 100–111)
CO2 SERPL-SCNC: 27 MMOL/L (ref 21–32)
CREAT SERPL-MCNC: 1.48 MG/DL (ref 0.6–1.3)
DIAGNOSIS, 93000: NORMAL
DIFFERENTIAL METHOD BLD: ABNORMAL
EOSINOPHIL # BLD: 0 K/UL (ref 0–0.4)
EOSINOPHIL NFR BLD: 0 % (ref 0–5)
ERYTHROCYTE [DISTWIDTH] IN BLOOD BY AUTOMATED COUNT: 13.7 % (ref 11.6–14.5)
GLUCOSE SERPL-MCNC: 149 MG/DL (ref 74–99)
HCT VFR BLD AUTO: 29.8 % (ref 36–48)
HGB BLD-MCNC: 10.1 G/DL (ref 13–16)
LYMPHOCYTES # BLD: 0.6 K/UL (ref 0.9–3.6)
LYMPHOCYTES NFR BLD: 5 % (ref 21–52)
MCH RBC QN AUTO: 28.5 PG (ref 24–34)
MCHC RBC AUTO-ENTMCNC: 33.9 G/DL (ref 31–37)
MCV RBC AUTO: 84.2 FL (ref 74–97)
MONOCYTES # BLD: 0.1 K/UL (ref 0.05–1.2)
MONOCYTES NFR BLD: 1 % (ref 3–10)
NEUTS SEG # BLD: 10.7 K/UL (ref 1.8–8)
NEUTS SEG NFR BLD: 94 % (ref 40–73)
P-R INTERVAL, ECG05: 170 MS
PLATELET # BLD AUTO: 393 K/UL (ref 135–420)
PMV BLD AUTO: 8.3 FL (ref 9.2–11.8)
POTASSIUM SERPL-SCNC: 4 MMOL/L (ref 3.5–5.5)
Q-T INTERVAL, ECG07: 386 MS
QRS DURATION, ECG06: 90 MS
QTC CALCULATION (BEZET), ECG08: 469 MS
RBC # BLD AUTO: 3.54 M/UL (ref 4.7–5.5)
SODIUM SERPL-SCNC: 126 MMOL/L (ref 136–145)
VENTRICULAR RATE, ECG03: 89 BPM
WBC # BLD AUTO: 11.4 K/UL (ref 4.6–13.2)

## 2021-04-06 PROCEDURE — 74011250637 HC RX REV CODE- 250/637: Performed by: HOSPITALIST

## 2021-04-06 PROCEDURE — 94760 N-INVAS EAR/PLS OXIMETRY 1: CPT

## 2021-04-06 PROCEDURE — 94640 AIRWAY INHALATION TREATMENT: CPT

## 2021-04-06 PROCEDURE — 36415 COLL VENOUS BLD VENIPUNCTURE: CPT

## 2021-04-06 PROCEDURE — 65660000000 HC RM CCU STEPDOWN

## 2021-04-06 PROCEDURE — 74011250636 HC RX REV CODE- 250/636: Performed by: HOSPITALIST

## 2021-04-06 PROCEDURE — 85025 COMPLETE CBC W/AUTO DIFF WBC: CPT

## 2021-04-06 PROCEDURE — 76450000000

## 2021-04-06 PROCEDURE — 83930 ASSAY OF BLOOD OSMOLALITY: CPT

## 2021-04-06 PROCEDURE — 80048 BASIC METABOLIC PNL TOTAL CA: CPT

## 2021-04-06 PROCEDURE — 74011250637 HC RX REV CODE- 250/637: Performed by: INTERNAL MEDICINE

## 2021-04-06 PROCEDURE — 73700 CT LOWER EXTREMITY W/O DYE: CPT

## 2021-04-06 PROCEDURE — 74011000250 HC RX REV CODE- 250: Performed by: HOSPITALIST

## 2021-04-06 PROCEDURE — 77010033678 HC OXYGEN DAILY

## 2021-04-06 RX ORDER — FUROSEMIDE 20 MG/1
20 TABLET ORAL ONCE
Status: COMPLETED | OUTPATIENT
Start: 2021-04-06 | End: 2021-04-06

## 2021-04-06 RX ADMIN — ARFORMOTEROL TARTRATE 15 MCG: 15 SOLUTION RESPIRATORY (INHALATION) at 07:24

## 2021-04-06 RX ADMIN — DILTIAZEM HYDROCHLORIDE 30 MG: 30 TABLET, FILM COATED ORAL at 06:51

## 2021-04-06 RX ADMIN — IPRATROPIUM BROMIDE AND ALBUTEROL SULFATE 3 ML: .5; 3 SOLUTION RESPIRATORY (INHALATION) at 07:24

## 2021-04-06 RX ADMIN — IPRATROPIUM BROMIDE AND ALBUTEROL SULFATE 3 ML: .5; 3 SOLUTION RESPIRATORY (INHALATION) at 20:17

## 2021-04-06 RX ADMIN — METHYLPREDNISOLONE SODIUM SUCCINATE 40 MG: 125 INJECTION, POWDER, FOR SOLUTION INTRAMUSCULAR; INTRAVENOUS at 21:45

## 2021-04-06 RX ADMIN — ARFORMOTEROL TARTRATE 15 MCG: 15 SOLUTION RESPIRATORY (INHALATION) at 20:17

## 2021-04-06 RX ADMIN — FUROSEMIDE 20 MG: 20 TABLET ORAL at 21:45

## 2021-04-06 RX ADMIN — CEFTRIAXONE 1 G: 1 INJECTION, POWDER, FOR SOLUTION INTRAMUSCULAR; INTRAVENOUS at 18:12

## 2021-04-06 RX ADMIN — ACETAMINOPHEN 650 MG: 325 TABLET ORAL at 06:54

## 2021-04-06 RX ADMIN — Medication 10 ML: at 14:00

## 2021-04-06 RX ADMIN — TAMSULOSIN HYDROCHLORIDE 0.4 MG: 0.4 CAPSULE ORAL at 18:13

## 2021-04-06 RX ADMIN — IPRATROPIUM BROMIDE AND ALBUTEROL SULFATE 3 ML: .5; 3 SOLUTION RESPIRATORY (INHALATION) at 00:09

## 2021-04-06 RX ADMIN — FAMOTIDINE 20 MG: 20 TABLET, FILM COATED ORAL at 08:11

## 2021-04-06 RX ADMIN — AMLODIPINE BESYLATE 5 MG: 5 TABLET ORAL at 08:53

## 2021-04-06 RX ADMIN — FUROSEMIDE 20 MG: 20 TABLET ORAL at 08:53

## 2021-04-06 RX ADMIN — IPRATROPIUM BROMIDE AND ALBUTEROL SULFATE 3 ML: .5; 3 SOLUTION RESPIRATORY (INHALATION) at 13:46

## 2021-04-06 RX ADMIN — BUDESONIDE 500 MCG: 0.5 INHALANT RESPIRATORY (INHALATION) at 07:24

## 2021-04-06 RX ADMIN — DILTIAZEM HYDROCHLORIDE 30 MG: 30 TABLET, FILM COATED ORAL at 18:13

## 2021-04-06 RX ADMIN — FAMOTIDINE 20 MG: 20 TABLET, FILM COATED ORAL at 21:45

## 2021-04-06 RX ADMIN — AZITHROMYCIN MONOHYDRATE 500 MG: 250 TABLET ORAL at 18:13

## 2021-04-06 RX ADMIN — DILTIAZEM HYDROCHLORIDE 30 MG: 30 TABLET, FILM COATED ORAL at 13:38

## 2021-04-06 RX ADMIN — METHYLPREDNISOLONE SODIUM SUCCINATE 40 MG: 125 INJECTION, POWDER, FOR SOLUTION INTRAMUSCULAR; INTRAVENOUS at 13:38

## 2021-04-06 RX ADMIN — BUDESONIDE 500 MCG: 0.5 INHALANT RESPIRATORY (INHALATION) at 20:17

## 2021-04-06 RX ADMIN — ENOXAPARIN SODIUM 40 MG: 40 INJECTION SUBCUTANEOUS at 08:53

## 2021-04-06 RX ADMIN — METHYLPREDNISOLONE SODIUM SUCCINATE 40 MG: 125 INJECTION, POWDER, FOR SOLUTION INTRAMUSCULAR; INTRAVENOUS at 06:51

## 2021-04-06 NOTE — PROGRESS NOTES
OT order received and chart reviewed. Pt awaiting ortho consult re: L hip pain, AVN. Will hold skilled OT evaluation until after seen by ortho. RN notified.         Thank you for this referral,   Get Mendoza MS, OTR/L

## 2021-04-06 NOTE — PROGRESS NOTES
0710 : Bedside shift change report given to [address] (oncoming nurse) by Saritha Santos RN (offgoing nurse). Report included the following information SBAR, Kardex, Intake/Output and MAR.     0855 : morning meds and shift assessment completed. Attempted to do MRI screening form and PT refused. Stated he gets really bad anxiety and claustrophilic, he will not attempt MRI. This RN explained we can try to get medication ordered to help anxiety and pt still refused. Pt is requesting for pain medications for leg and hip pain. Does not want tylenol because \"it does not touch it\"      [phone] : spoke with tech for MRI regarding safety issues. Needs further clarification on what kind of clip he received for his brain anurism, Dr Judah Wiley made aware of situation. 1338 : reassessment completed, no hcanges to shift assessment. Pt sitting on side of bed. 1744 : reassessment completed, no changes to shift assessment. 1935 : Bedside shift change report given to [address] (oncoming nurse) by Annelise Harry RN (offgoing nurse). Report included the following information SBAR, Kardex, Intake/Output and MAR.

## 2021-04-06 NOTE — PROGRESS NOTES
Palliative Medicine    CODE STATUS: PARTIAL CODE (DNR, okay for intubation with two week trial of ventilation, no feeding tubes) Has POST on file dated 8/7/2020 . Dr Mj Russell updated on patient's wishes    AMD Status: Has completed AMD naming his wife Pascale Iglesias as his primary surrogate decision maker and his daughter, Richard Henry, as his surrogate decision maker. Reviewed this document and he says his wishes remain the same     Date of Initial Consult: 4/6/2021  Reason for Consult: goals of care discussion; support  Requesting Provider: Dr Mj Russell  Primary Care Physician: Dr Joaquina Flood   (seen with TAMI Bridges)     PMH: (obtained from medical record and patient)   Prostate Ca s/p radiation; brain aneurysm; COPD; HTN; Afib    History relative to admission: Came to the ED 4/5/2021 from home with shortness of breath that has been worsening over the past 1-2 weeks and left knee and hip pain. He is on home O2. Has had multiple admissions recently for worsening COPD    Relevant Diagnostic Information:     Ref. Range 4/5/2021 13:30 4/6/2021 05:05   WBC Latest Ref Range: 4.6 - 13.2 K/uL 13.1 11.4   RBC Latest Ref Range: 4.70 - 5.50 M/uL 4.16 (L) 3.54 (L)   HGB Latest Ref Range: 13.0 - 16.0 g/dL 12.2 (L) 10.1 (L)   HCT Latest Ref Range: 36.0 - 48.0 % 35.3 (L) 29.8 (L)   MCV Latest Ref Range: 74.0 - 97.0 FL 84.9 84.2   MCH Latest Ref Range: 24.0 - 34.0 PG 29.3 28.5   MCHC Latest Ref Range: 31.0 - 37.0 g/dL 34.6 33.9   RDW Latest Ref Range: 11.6 - 14.5 % 13.8 13.7   PLATELET Latest Ref Range: 135 - 420 K/uL 391 393      Ref.  Range 4/5/2021 13:30 4/6/2021 05:05   Sodium Latest Ref Range: 136 - 145 mmol/L 126 (L) 126 (L)   Potassium Latest Ref Range: 3.5 - 5.5 mmol/L 2.8 (LL) 4.0   Chloride Latest Ref Range: 100 - 111 mmol/L 85 (L) 89 (L)   CO2 Latest Ref Range: 21 - 32 mmol/L 32 27   Anion gap Latest Ref Range: 3.0 - 18 mmol/L 9 10   Glucose Latest Ref Range: 74 - 99 mg/dL 109 (H) 149 (H)   BUN Latest Ref Range: 7.0 - 18 MG/DL 24 (H) 31 (H)   Creatinine Latest Ref Range: 0.6 - 1.3 MG/DL 1.29 1.48 (H)      Ref. Range 4/5/2021 13:30   Troponin-I, Qt. Latest Ref Range: 0.0 - 0.045 NG/ML <0.02   Results for Serina Ryder (MRN [id number]) as of 4/6/2021 09:58   Ref. Range 4/5/2021 17:31   pH (POC) Latest Ref Range: 7.35 - 7.45   7.47 (H)   pCO2 (POC) Latest Ref Range: 35.0 - 45.0 MMHG 40.5   pO2 (POC) Latest Ref Range: 80 - 100 MMHG 69 (L)   HCO3 (POC) Latest Ref Range: 22 - 26 MMOL/L 29.7 (H)   sO2 (POC) Latest Ref Range: 92 - 97 % 95   Base excess (POC) Latest Units: mmol/L 6   FIO2 (POC) Latest Units: % 21   Patient temp. Latest Units:   98.4   Specimen type (POC) Latest Units:   ARTERIAL   Site Latest Units:   RIGHT RADIAL   Device: Latest Units:   ROOM AIR   4/5/2021: rapid COVID NEGATIVE  4/5/2021: CXR: Improved aeration with mild, less evident ill-defined opacity in the right infrahilar region, which can reflect chronic atelectasis or infiltrate. No additional change. 4/5/2021: Left hip xray: Severe left hip osteoarthrosis with femoral head collapse/remodeling. Sclerosis  and lucency in femoral head may reflect collapse/remodeling versus AVN. Consider MRI in the appropriate clinical setting. No evidence of acute fracture or dislocation.   4/5/2021: left knee xray: No acute bony abnormality is identified by plain films. 4/6/2021 Seen today in room 341. Awake, alert. Oriented x 4. Respirations unlabored with O2 on 2 lpm per [place] States having significant pain in left knee and hip. \"This is making it difficult for me to live at home. I can't do anything. \" Very hard of hearing despite having his hearing aids in. Lives with his wife, Sarah Bay. They have been  since 200. He has five children from his second marriage, all of whom live in the local area. He lives in a single floor home    Independent in ADLs. Uses walker and cane for ambulation assistance.  When feeling well enough he does drive but he has been getting so short of breath he has nto been able to walk to his car. His wife does drive. Introduced the role of palliative medicine for the hospitalized patient. Reviewed the documents he has on file. He has an AMD naming his wife and daughter as his surrogate decision makers and has a POST stating DNR, intubation okay with a two week trial of ventilation, and no feeding tubes. During the discussion we described the four components of cardiac resuscitation: compressions, medications, electric shucks, and intubation/ventilation. Reviewed benefits and burdens to include fractured ribs, punctured lungs, long-term ventilations with need for discussion of tracheostomy or compassionate extubation. At this point he will be  PARTIAL CODE--DNR, okay for intubation, and NO FEEDING TUBES.  Dr Judah Wiley notified of patient's wishes    He is very concerned about his ability to care for himself at home, even with his wife's assist.    Disposition plan: Pending further management of his COPD and evaluation of his musculoskeletal pain    Palliative Medicine remains available for any questions or concerns    Danny Riggins RN, MSN  P: [phone]

## 2021-04-06 NOTE — PROGRESS NOTES
4/6/2021 PT note: consult received and chart reviewed. Pt awaiting ortho consult re: L hip pain, AVN. Will hold PT evaluation until after seen by ortho. Pt nurse made aware.   Thank you for this referral.   Narcisa Tracey, PT

## 2021-04-06 NOTE — PROGRESS NOTES
Nephrology Progress Note    Subjective:     Beba You is a 66 y.o.  male with a PMH of HTN, COPD on home O2, P Afib, hx Covid 2/26 then s/p J&J Covid vax 3 weeks ago now presenting with SOB. Pt c/o swelling, arthirtis- no N/V, diarrhea, CP, Denies fever. Na here 126 on 4/5/21. Back on 3/3/21, baseline Na was 139. Today, Na unchanged at 126. Cr 1.4 from 1.2 baseline. UO was over 300cc recorded yesterday. Reports improving breathing.     Admit Date: 4/5/2021  Patient Active Problem List   Diagnosis Code    Hypertension I10    COPD (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 ([facility]) J44.9    Chronic renal disease, stage 3, moderately decreased glomerular filtration rate between 30-59 mL/min/1.73 square meter ([facility]) N18.30    Asbestosis ([facility] J61    Acute exacerbation of chronic obstructive pulmonary disease (COPD) ([address] J44.1    Debility R53.81    Paroxysmal atrial fibrillation ([facility]) I48.0    Chronic respiratory failure with hypoxia ([facility]) J96.11    Tobacco dependence F17.200    Hyponatremia E87.1    Pleural effusion, right J90    COPD with acute exacerbation ([facility] J44.1    Acute respiratory failure with hypoxia and hypercarbia ([facility]) J96.01, J96.02    Hyponatremia E87.1    Advanced care planning/counseling discussion Z71.89    Hypokalemia E87.6    COPD (chronic obstructive pulmonary disease) ([facility]) J44.9    CAP (community acquired pneumonia) J18.9    Respiratory distress R06.03    COPD exacerbation ([facility]) J44.1    Atrial fibrillation with RVR ([facility]) I48.91    Acute respiratory failure ([facility]) J96.00    Left hip pain M25.552    PAF (paroxysmal atrial fibrillation) ([facility]) I48.0     Current Facility-Administered Medications   Medication Dose Route Frequency    amLODIPine (NORVASC) tablet 5 mg  5 mg Oral DAILY    tamsulosin (FLOMAX) capsule 0.4 mg  0.4 mg Oral QPM    dilTIAZem IR (CARDIZEM) tablet 30 mg  30 mg Oral TIDAC    furosemide (LASIX) tablet 20 mg  20 mg Oral DAILY  methylPREDNISolone (PF) (Solu-MEDROL) injection 40 mg  40 mg IntraVENous Q8H    azithromycin (ZITHROMAX) tablet 500 mg  500 mg Oral Q24H    cefTRIAXone (ROCEPHIN) 1 g in sterile water (preservative free) 10 mL IV syringe  1 g IntraVENous Q24H    sodium chloride (NS) flush 5-40 mL  5-40 mL IntraVENous Q8H    sodium chloride (NS) flush 5-40 mL  5-40 mL IntraVENous PRN    acetaminophen (TYLENOL) tablet 650 mg  650 mg Oral Q6H PRN    Or    acetaminophen (TYLENOL) suppository 650 mg  650 mg Rectal Q6H PRN    bisacodyL (DULCOLAX) suppository 10 mg  10 mg Rectal DAILY PRN    promethazine (PHENERGAN) tablet 12.5 mg  12.5 mg Oral Q6H PRN    Or    ondansetron (ZOFRAN) injection 4 mg  4 mg IntraVENous Q6H PRN    famotidine (PEPCID) tablet 20 mg  20 mg Oral BID    enoxaparin (LOVENOX) injection 40 mg  40 mg SubCUTAneous DAILY    budesonide (PULMICORT) 500 mcg/2 ml nebulizer suspension  500 mcg Nebulization BID RT    arformoteroL (BROVANA) neb solution 15 mcg  15 mcg Nebulization BID RT    albuterol-ipratropium (DUO-NEB) 2.5 MG-0.5 MG/3 ML  3 mL Nebulization Q4H PRN       Allergy:   Allergies   Allergen Reactions    Aspirin Other (comments)     \"messes my stomach up\"        Objective:     Visit Vitals  /62   Pulse 93   Temp 97.7 °F (36.5 °C)   Resp 20   Ht 5' 7\" (1.702 m)   Wt 104.8 kg (231 lb)   SpO2 99%   BMI 36.18 kg/m²       Intake/Output Summary (Last 24 hours) at 4/6/2021 1848  Last data filed at 4/6/2021 1813  Gross per 24 hour   Intake --   Output 625 ml   Net -625 ml       Physical Exam:       General: No acute distress   HENT: Atraumatic and normocephalic   Eyes: Normal conjunctiva   Neck: Supple with no JVD   Cardiovascular: Normal S1 & S2, no m/r/g   Pulmonary/Chest Wall: Clear to auscultation bilaterally   Abdominal: Soft and non-tender   Musculoskeletal: + edema    Neurological: No focal deficits       Data Review:  Recent Labs     04/06/21  0505 04/05/21  1330   * 126*   K 4.0 2.8* CL 89* 85*   CO2 27 32   BUN 31* 24*   CREA 1.48* 1.29   * 109*   CA 8.7 9.3     Recent Labs     04/06/21  0505 04/05/21  1330   WBC 11.4 13.1   HGB 10.1* 12.2*   HCT 29.8* 35.3*    391     Recent Labs     04/05/21  1330   ALB 3.5     No results for input(s): INR, PTP, APTT, INREXT in the last 72 hours. No results for input(s): IRON, FE, TIBC, IBCT, PSAT, FERR in the last 72 hours. Most recent labs: reviewed. Impression:     Hyponatremia, hypervolemic, acute. Na 126 this AM.  Pending serum osm. Has normal TSH. COPD, stable. Neg rapid COVID test  Hypokalemia, resolved, K 4.0 today  HTN  Paroxysmal Afib  Arthiritis with left hip pain  Anemia    Plan:     Cardiazem for afib  Cont lasix 20mg po daily. Give an extra dose of 20mg po lasix in evening  Strict I/Os  AM labs with BMP, urine Na and urine osm.   Avoid NSAIDs      MD Eric Rowan  [phone]

## 2021-04-06 NOTE — PROGRESS NOTES
Hospitalist Progress Note-critical care note     Patient: Chrystal Loera MRN: [id number]  CSN: [id number]    YOB: 1945  Age: 66 y.o. Sex: male    DOA: 4/5/2021 LOS:  LOS: 1 day            Chief complaint: left hip pain, paf, hyponatremia.  Debility , copd     Assessment/Plan         Hospital Problems  Date Reviewed: 2/27/2021          Codes Class Noted POA    Left hip pain ICD-10-CM: M25.552  ICD-9-CM: 719.45  4/5/2021 Unknown        PAF (paroxysmal atrial fibrillation) (H[facility]) ICD-10-CM: I48.0  ICD-9-CM: 427.31  4/5/2021 Unknown        Hypokalemia ICD-10-CM: E87.6  ICD-9-CM: 276.8  4/14/2020 Yes        Hyponatremia ICD-10-CM: E87.1  ICD-9-CM: 276.1  4/10/2020 Yes        Debility ICD-10-CM: R53.81  ICD-9-CM: 799.3  7/8/2019 Yes        * (Principal) Acute exacerbation of chronic obstructive pulmonary disease (COPD) ([facility] ICD-10-CM: J44.1  ICD-9-CM: 491.21  2/8/2019 Unknown        Asbestosis ([facility] ICD-10-CM: I72  ICD-9-CM: 126  10/19/2018 Yes        Hypertension ICD-10-CM: H61  ICD-9-CM: 401.9  Unknown Yes                Patient was admitted due to hyponatremia and hypokalemia and COPD exacerbation.        Acute COPD exacerbation-mild improving-will start weaning steroid tomorrow   Iv steroid, breathing tx, empiric abx   Rapid covid 19 undetected      Asbestosis and chronic respiration failure with hypoxia   On home O2 2 L    Continue      paf   Continue cardizem      Hypertension: On Cardizem      Hypokalemia and hyponatremia   K is good now, na still low   Nephrologist f/u with pt   Continue monitoring      Fluid overloaded-leg swelling -improving   dvt negative  Lasix per renal   Echo ef 14-86 % mild systolic dysfunction-done in Feb, 2021     Hearing loss   Having hearing aid   communication board      Hip pain   X-ray reviewed,   He refused to do mri hip-will have ct hip     Subjective: I can not walk, I will not do mri, my breathing is better.      Discussed with palliative care team,-partial code     [address] [phone]     All questions have been answered. 35 total min's spent on patient care including >50% on counseling/coordinating care. Discussed the above assessments. also discussed labs, medications and hospital course        Disposition :tbd,   Review of systems:    General: No fevers or chills. Cardiovascular: No chest pain or pressure. No palpitations. Pulmonary: No shortness of breath. Gastrointestinal: No nausea, vomiting. Msk: left hip pain     Vital signs/Intake and Output:  Visit Vitals  BP (!) 144/62   Pulse 99   Temp 97.4 °F (36.3 °C)   Resp 20   Ht 5' 7\" (1.702 m)   Wt 104.8 kg (231 lb)   SpO2 (P) 96%   BMI 36.18 kg/m²     Current Shift:  No intake/output data recorded. Last three shifts:  04/04 1901 - 04/06 0700  In: -   Out: 300 [Urine:300]    Physical Exam:  General: WD, WN. Alert, cooperative, no acute distress    HEENT: NC, Atraumatic. PERRLA, anicteric sclerae. Lungs: CTA Bilaterally. No Wheezing/Rhonchi/Rales. Heart:  Regular  rhythm,  No murmur, No Rubs, No Gallops  Abdomen: Soft, Non distended, Non tender. +Bowel sounds,   Extremities: No c/c mild edema   Psych:   Not anxious or agitated. Neurologic:  No acute neurological deficit. Labs: Results:       Chemistry Recent Labs     04/06/21  0505 04/05/21  1330   * 109*   * 126*   K 4.0 2.8*   CL 89* 85*   CO2 27 32   BUN 31* 24*   CREA 1.48* 1.29   CA 8.7 9.3   AGAP 10 9   BUCR 21* 19   ALB  --  3.5      CBC w/Diff Recent Labs     04/06/21  0505 04/05/21  1330   WBC 11.4 13.1   RBC 3.54* 4.16*   HGB 10.1* 12.2*   HCT 29.8* 35.3*    391   GRANS 94* 80*   LYMPH 5* 9*   EOS 0 4      Cardiac Enzymes Recent Labs     04/05/21  1330      CKND1 3.4      Coagulation No results for input(s): PTP, INR, APTT, INREXT in the last 72 hours.     Lipid Panel No results found for: CHOL, CHOLPOCT, CHOLX, CHLST, CHOLV, 357997, HDL, HDLP, LDL, LDLC, DLDLP, 806960, VLDLC, VLDL, TGLX, TRIGL, TRIGP, TGLPOCT, CHHD, CHHDX   BNP No results for input(s): BNPP in the last 72 hours. Liver Enzymes Recent Labs     04/05/21  1330   ALB 3.5      Thyroid Studies Lab Results   Component Value Date/Time    TSH 3.06 04/05/2021 01:30 PM        Procedures/imaging: see electronic medical records for all procedures/Xrays and details which were not copied into this note but were reviewed prior to creation of Plan    Xr Hip Lt W Or Wo Pelv 2-3 Vws    Result Date: 4/5/2021  EXAM: LEFT HIP RADIOGRAPHS CLINICAL INDICATION/HISTORY: left hip pain, difficulty ambulating -Additional: None COMPARISON: Supine with 2/26/2021 TECHNIQUE: AP pelvis and frog-leg lateral view of left hip. _______________ FINDINGS: BONES: Severe left hip osteoarthrosis with femoral head remodeling and collapse. No evidence of acute displaced fracture or dislocation. SOFT TISSUES: Unremarkable. _______________     Severe left hip osteoarthrosis with femoral head collapse/remodeling. Sclerosis and lucency in femoral head may reflect collapse/remodeling versus AVN. Consider MRI in the appropriate clinical setting. No evidence of acute fracture or dislocation. Xr Knee Lt Max 2 Vws    Result Date: 4/5/2021  HISTORY: -Provided on order: pain, difficulty ambulating -Additional: None Technique: 2 views of left knee are presented for interpretation. Comparison study: none. Findings: The bones are osteopenic. There is no plain film evident fracture or dislocation. No radiopaque foreign body or significant arthropathy. No significant arthropathy. No plain film evident knee joint effusion is seen. 1. No acute bony abnormality is identified by plain films. Intrinsic knee ligamentous, meniscal and cartilaginous integrity can be best evaluated by routine knee MRI if clinically warranted. Xr Chest Port    Result Date: 4/5/2021  HISTORY: -Provided with order: SOB -Additional: Bilateral lower extremity swelling.  Technique : AP PORTABLE CHEST Comparison : 02/28/21 FINDINGS: Patient is rotated to the right. The chin obscures the upper thorax. There is mild motion degradation and external artifact which additionally limits visibility. HEART AND MEDIASTINUM: Unremarkable. LUNGS AND PLEURAL SPACES: Calcified pleural plaques present on the right. Vague opacity in the right infrahilar region is less evident than the prior, but degraded by motion. There is stable elevation of the right diaphragm. BONY THORAX AND SOFT TISSUES: No acute osseous abnormality. Improved aeration with mild, less evident ill-defined opacity in the right infrahilar region, which can reflect chronic atelectasis or infiltrate. No additional change. Duplex Lower Ext Venous Bilat    Result Date: 4/5/2021  · No evidence of deep vein thrombosis in the right lower extremity veins assessed. · No evidence of deep vein thrombosis in the left lower extremity veins assessed.         Namita Colin MD

## 2021-04-06 NOTE — PROGRESS NOTES
Problem: Chronic Obstructive Pulmonary Disease (COPD)  Goal: *Oxygen saturation during activity within specified parameters  Outcome: Progressing Towards Goal  Goal: *Able to remain out of bed as prescribed  Outcome: Progressing Towards Goal  Goal: *Absence of hypoxia  Outcome: Progressing Towards Goal  Goal: *Optimize nutritional status  Outcome: Progressing Towards Goal     Problem: Falls - Risk of  Goal: *Absence of Falls  Description: Document Darell Fall Risk and appropriate interventions in the flowsheet.   Outcome: Progressing Towards Goal  Note: Fall Risk Interventions:  Mobility Interventions: Assess mobility with egress test, PT Consult for mobility concerns, OT consult for ADLs         Medication Interventions: Patient to call before getting OOB, Teach patient to arise slowly    Elimination Interventions: Call light in reach, Patient to call for help with toileting needs

## 2021-04-06 NOTE — PROGRESS NOTES
Bedside and Verbal shift change report given to Serena Soulier, RN (oncoming nurse) by Sincere Randhawa RN (offgoing nurse). Report included the following information SBAR, Kardex, ED Summary, Intake/Output, MAR, Recent Results, Med Rec Status and Cardiac Rhythm NSR     1952  Shift assessment complete  Pt resting quietly sitting on side of bed   Eyes open and chest rising and falling evenly   No sign of distress   0021  Reassessment complete  Pt resting quietly with eyes closed and chest rising and falling evenly   0503  Reassessment complete  Pt resting with eyes closed and chest rising and falling evenly     Shift uneventful     [facility] [place] [facility] Night Shift Chart Audit    Chart Audit completed?  YES

## 2021-04-06 NOTE — PROGRESS NOTES
Reason for Admission:   hip pain and sob                  RUR Score:  High; 31%         PCP: First and Last name:  Huy High MD     Name of Practice:   Are you a current patient: Yes/No:   Approximate date of last visit:    Can you do a virtual visit with your PCP:              Resources/supports as identified by patient/family:                   Top Challenges facing patient (as identified by patient/family and CM): Finances/Medication cost?                    Transportation? Support system or lack thereof? Living arrangements? Self-care/ADLs/Cognition? Current Advanced Directive/Advance Care Plan:  Full Code      Healthcare Decision Maker:   Click here to complete HealthCare Decision Makers including selection of the Healthcare Decision Maker Relationship (ie \"Primary\")      Primary Decision MakerSshahzad Turk - [phone]    Secondary Decision Maker: Nikajad Guyshayy Marin - [phone]    Payor Source Payor: [place] MED[place]ARE / Plan: [place] MEDICARE PART A / Product Type: Medicare /                             Plan for utilizing home health:    TBD                 Transition of Care Plan:   [facility] and physician follow up vs rehab               Chart reviewed. Per H&P \"Dre Gonsales is a 66 y.o. male with history of PAF, COPD,asbetosis, chronic respiratory failure with hypoxia 2 L came to ER due to hip pain and sob. He reported that he has pain in his left hip and knee with difficult ambulation for 2-3 weeks. He used O2 at home, reported sob worsening for 1-2 weeks. He received iv steroid and breathing tx in ER, x-ray of hip/chest/knee no acute process. He was found to sodium 126 potassium 2.8. He received 40 M EQ p.o. potassium and 10 M EQ potassium per IV. \"    CM reviewed previous admissions. Noted pt has home O2 through [employer]. Pt also has a cane and a RW. Pt is currently pending therapy evaluations.   CM to await outcome of therapy evaluations to further assist with identifying a transition of care. CM to continue to follow and assist.    2571:  CM met with pt at bedside to discuss transition of care. Pt is extremely [place]. Pt has given permission for CM to contact his wife, Shekhar Lr ([phone]). CM to continue to follow and assist.      5743:  CM contacted pt's wife, Shekhar Lr ([phone]), to discuss transition of care. Pt's wife has indicated she is not able to care for this pt at this time and would like SNF. CM offered FOC. Pt/family have indicated [facility] and they would like to return to this facility if possible. CMS has been notfied to assist.  CM notified pt's wife that the pt would have to be agreeable to SNF as well. Pt's wife has indicated she will be speaking with her  today.   CM to continue to follow and assist.      Care Management Interventions  Mode of Transport at Discharge: BLS  Transition of Care Consult (CM Consult): Discharge Planning, SNF  Health Maintenance Reviewed: Yes  Physical Therapy Consult: Yes  Occupational Therapy Consult: Yes  Current Support Network: Lives with Spouse  The Plan for Transition of Care is Related to the Following Treatment Goals : [facility] and physician follow up vs SNF  The Patient and/or Patient Representative was Provided with a Choice of Provider and Agrees with the Discharge Plan?: Yes  Name of the Patient Representative Who was Provided with a Choice of Provider and Agrees with the Discharge Plan: Shekhar Lr  Freedom of Choice List was Provided with Basic Dialogue that Supports the Patient's Individualized Plan of Care/Goals, Treatment Preferences and Shares the Quality Data Associated with the Providers?: Yes  Discharge Location  Discharge Placement: Skilled nursing facility(vs [facility])

## 2021-04-06 NOTE — PROGRESS NOTES
[address] [facility] [phone]  [facility] [phone]      Palliative Care Support: This writer, along with Carson Campos RN, with the Palliative Care team; met with patient to offer support and to verify the existing Advance Medical Directive (AMD) and POST form. Patient was sitting on the edge of the bed and was in good spirits. Patient is extremely hard of hearing, but was alert and oriented x 4. Patient lives with his wife Lyssa Little, HI#[phone]). He has children who live close by. Patient was fairly independent. He uses a walker and a cane for mobility purposes. He reported that he is not able to walk now and is in a lot of pain in his left knee and left hip. He reported that he would not be able to return home if he cannot walk and care for himself. He uses home O2 (2L). The Palliative Care team then asked him about his AMD on file. He verified that the AMD is accurate and no changes to be made. His primary decision maker is Lyssa Cali, his wife. His secondary decision maker is Klarissa Sanon, #[phone]), his daughter. Patient was then asked about the POST form, that is on file. He at first stated that he wanted CPR; however, after being re-educated regarding the risks and burdens of CPR, he stated that he did not want CPR. Patient is okay with being put on the ventilator for two weeks. No trach. Patient does not want a feeding tube. At this time, patient is a DNR and is okay with intubation for a two week trial period and no feeding tube and no trach. Recommendations: The Palliative Care team will continue to offer support to patient; while he remains hospitalized. Tapan Venegas., [facility] [place] [facility]  Palliative Care   COB[facility]#[phone]

## 2021-04-07 LAB
ANION GAP SERPL CALC-SCNC: 11 MMOL/L (ref 3–18)
BASOPHILS # BLD: 0 K/UL (ref 0–0.1)
BASOPHILS NFR BLD: 0 % (ref 0–2)
BUN SERPL-MCNC: 39 MG/DL (ref 7–18)
BUN/CREAT SERPL: 25 (ref 12–20)
CALCIUM SERPL-MCNC: 8.4 MG/DL (ref 8.5–10.1)
CHLORIDE SERPL-SCNC: 91 MMOL/L (ref 100–111)
CO2 SERPL-SCNC: 28 MMOL/L (ref 21–32)
CREAT SERPL-MCNC: 1.56 MG/DL (ref 0.6–1.3)
DIFFERENTIAL METHOD BLD: ABNORMAL
EOSINOPHIL # BLD: 0 K/UL (ref 0–0.4)
EOSINOPHIL NFR BLD: 0 % (ref 0–5)
ERYTHROCYTE [DISTWIDTH] IN BLOOD BY AUTOMATED COUNT: 13.5 % (ref 11.6–14.5)
GLUCOSE SERPL-MCNC: 163 MG/DL (ref 74–99)
HCT VFR BLD AUTO: 28 % (ref 36–48)
HGB BLD-MCNC: 9.7 G/DL (ref 13–16)
LYMPHOCYTES # BLD: 0.6 K/UL (ref 0.9–3.6)
LYMPHOCYTES NFR BLD: 4 % (ref 21–52)
MCH RBC QN AUTO: 29.4 PG (ref 24–34)
MCHC RBC AUTO-ENTMCNC: 34.6 G/DL (ref 31–37)
MCV RBC AUTO: 84.8 FL (ref 74–97)
MONOCYTES # BLD: 0.5 K/UL (ref 0.05–1.2)
MONOCYTES NFR BLD: 3 % (ref 3–10)
NEUTS SEG # BLD: 13.1 K/UL (ref 1.8–8)
NEUTS SEG NFR BLD: 91 % (ref 40–73)
OSMOLALITY SERPL: 270 MOSMOL/KG (ref 280–301)
PLATELET # BLD AUTO: 369 K/UL (ref 135–420)
PMV BLD AUTO: 8.6 FL (ref 9.2–11.8)
POTASSIUM SERPL-SCNC: 3.2 MMOL/L (ref 3.5–5.5)
RBC # BLD AUTO: 3.3 M/UL (ref 4.35–5.65)
SODIUM SERPL-SCNC: 130 MMOL/L (ref 136–145)
SODIUM UR-SCNC: 29 MMOL/L (ref 20–110)
WBC # BLD AUTO: 14.4 K/UL (ref 4.6–13.2)

## 2021-04-07 PROCEDURE — 80048 BASIC METABOLIC PNL TOTAL CA: CPT

## 2021-04-07 PROCEDURE — 74011000250 HC RX REV CODE- 250: Performed by: HOSPITALIST

## 2021-04-07 PROCEDURE — 77010033678 HC OXYGEN DAILY

## 2021-04-07 PROCEDURE — 74011250637 HC RX REV CODE- 250/637: Performed by: INTERNAL MEDICINE

## 2021-04-07 PROCEDURE — 84300 ASSAY OF URINE SODIUM: CPT

## 2021-04-07 PROCEDURE — 74011250636 HC RX REV CODE- 250/636: Performed by: HOSPITALIST

## 2021-04-07 PROCEDURE — 83935 ASSAY OF URINE OSMOLALITY: CPT

## 2021-04-07 PROCEDURE — 99221 1ST HOSP IP/OBS SF/LOW 40: CPT | Performed by: NURSE PRACTITIONER

## 2021-04-07 PROCEDURE — 65660000000 HC RM CCU STEPDOWN

## 2021-04-07 PROCEDURE — 36415 COLL VENOUS BLD VENIPUNCTURE: CPT

## 2021-04-07 PROCEDURE — 85025 COMPLETE CBC W/AUTO DIFF WBC: CPT

## 2021-04-07 PROCEDURE — 74011250637 HC RX REV CODE- 250/637: Performed by: HOSPITALIST

## 2021-04-07 PROCEDURE — 94640 AIRWAY INHALATION TREATMENT: CPT

## 2021-04-07 RX ORDER — POTASSIUM CHLORIDE 20 MEQ/1
40 TABLET, EXTENDED RELEASE ORAL 2 TIMES DAILY
Status: COMPLETED | OUTPATIENT
Start: 2021-04-07 | End: 2021-04-07

## 2021-04-07 RX ORDER — IPRATROPIUM BROMIDE 0.5 MG/2.5ML
0.5 SOLUTION RESPIRATORY (INHALATION)
Status: DISCONTINUED | OUTPATIENT
Start: 2021-04-07 | End: 2021-04-14 | Stop reason: HOSPADM

## 2021-04-07 RX ORDER — FAMOTIDINE 20 MG/1
20 TABLET, FILM COATED ORAL DAILY
Status: DISCONTINUED | OUTPATIENT
Start: 2021-04-08 | End: 2021-04-09

## 2021-04-07 RX ADMIN — TAMSULOSIN HYDROCHLORIDE 0.4 MG: 0.4 CAPSULE ORAL at 17:05

## 2021-04-07 RX ADMIN — Medication 10 ML: at 14:00

## 2021-04-07 RX ADMIN — METHYLPREDNISOLONE SODIUM SUCCINATE 40 MG: 125 INJECTION, POWDER, FOR SOLUTION INTRAMUSCULAR; INTRAVENOUS at 06:25

## 2021-04-07 RX ADMIN — ENOXAPARIN SODIUM 40 MG: 40 INJECTION SUBCUTANEOUS at 08:13

## 2021-04-07 RX ADMIN — AMLODIPINE BESYLATE 5 MG: 5 TABLET ORAL at 08:14

## 2021-04-07 RX ADMIN — CEFTRIAXONE 1 G: 1 INJECTION, POWDER, FOR SOLUTION INTRAMUSCULAR; INTRAVENOUS at 17:05

## 2021-04-07 RX ADMIN — POTASSIUM CHLORIDE 40 MEQ: 1500 TABLET, EXTENDED RELEASE ORAL at 11:33

## 2021-04-07 RX ADMIN — AZITHROMYCIN MONOHYDRATE 500 MG: 250 TABLET ORAL at 17:05

## 2021-04-07 RX ADMIN — METHYLPREDNISOLONE SODIUM SUCCINATE 40 MG: 125 INJECTION, POWDER, FOR SOLUTION INTRAMUSCULAR; INTRAVENOUS at 21:26

## 2021-04-07 RX ADMIN — DILTIAZEM HYDROCHLORIDE 30 MG: 30 TABLET, FILM COATED ORAL at 06:25

## 2021-04-07 RX ADMIN — FAMOTIDINE 20 MG: 20 TABLET, FILM COATED ORAL at 08:14

## 2021-04-07 RX ADMIN — POTASSIUM CHLORIDE 40 MEQ: 1500 TABLET, EXTENDED RELEASE ORAL at 21:26

## 2021-04-07 RX ADMIN — IPRATROPIUM BROMIDE AND ALBUTEROL SULFATE 3 ML: .5; 3 SOLUTION RESPIRATORY (INHALATION) at 00:07

## 2021-04-07 RX ADMIN — IPRATROPIUM BROMIDE AND ALBUTEROL SULFATE 3 ML: .5; 3 SOLUTION RESPIRATORY (INHALATION) at 04:15

## 2021-04-07 RX ADMIN — DILTIAZEM HYDROCHLORIDE 30 MG: 30 TABLET, FILM COATED ORAL at 11:33

## 2021-04-07 RX ADMIN — IPRATROPIUM BROMIDE AND ALBUTEROL SULFATE 3 ML: .5; 3 SOLUTION RESPIRATORY (INHALATION) at 12:54

## 2021-04-07 RX ADMIN — BUDESONIDE 500 MCG: 0.5 INHALANT RESPIRATORY (INHALATION) at 07:47

## 2021-04-07 RX ADMIN — Medication 10 ML: at 21:28

## 2021-04-07 RX ADMIN — ARFORMOTEROL TARTRATE 15 MCG: 15 SOLUTION RESPIRATORY (INHALATION) at 07:47

## 2021-04-07 RX ADMIN — DILTIAZEM HYDROCHLORIDE 30 MG: 30 TABLET, FILM COATED ORAL at 17:05

## 2021-04-07 RX ADMIN — FUROSEMIDE 20 MG: 20 TABLET ORAL at 08:14

## 2021-04-07 NOTE — PROGRESS NOTES
OT NOTE:    Occupational therapy orders received. Chart review completed. Per RN, ortho saw pt last night (4/6/21) and told pt that he needs to get cardio and pulmonary clearance to proceed with L hip surgery. OT evaluation held and RN aware.     Thank you for this referral.  Clara Tillman OTR/[facility] SPAULDING

## 2021-04-07 NOTE — PROGRESS NOTES
Hospitalist Progress Note-critical care note     Patient: Emigdio Lopez MRN: [id number]  CSN: [id number]    YOB: 1945  Age: 66 y.o. Sex: male    DOA: 4/5/2021 LOS:  LOS: 2 days            Chief complaint: left hip pain, paf, hyponatremia. Debility , copd     Assessment/Plan         Hospital Problems  Date Reviewed: 2/27/2021          Codes Class Noted POA    Left hip pain ICD-10-CM: M25.552  ICD-9-CM: 719.45  4/5/2021 Unknown        PAF (paroxysmal atrial fibrillation) (HCC) ICD-10-CM: I48.0  ICD-9-CM: 427.31  4/5/2021 Unknown        Hypokalemia ICD-10-CM: E87.6  ICD-9-CM: 276.8  4/14/2020 Yes        Hyponatremia ICD-10-CM: E87.1  ICD-9-CM: 276.1  4/10/2020 Yes        Debility ICD-10-CM: R53.81  ICD-9-CM: 799.3  7/8/2019 Yes        * (Principal) Acute exacerbation of chronic obstructive pulmonary disease (COPD) ([facility] ICD-10-CM: J44.1  ICD-9-CM: 491.21  2/8/2019 Unknown        Asbestosis ([facility] ICD-10-CM: D52  ICD-9-CM: 767  10/19/2018 Yes        Hypertension ICD-10-CM: J30  ICD-9-CM: 401.9  Unknown Yes                Patient was admitted due to hyponatremia and hypokalemia and COPD exacerbation.        Acute COPD exacerbation  Improving ,will weaning iv steroid   Iv steroid, breathing tx, empiric abx   Rapid covid 19 undetected      Asbestosis and chronic respiration failure with hypoxia   On home O2 2 L       paf   Continue cardizem      Hypertension: On Cardizem      Hypokalemia and hyponatremia   K  Replacement, na is better 130 today   Nephrologist f/u with pt      Fluid overloaded-leg swelling -improving   dvt negative  Lasix per renal   Echo ef 97-10 % mild systolic dysfunction-done in Feb, 2021     Hearing loss   Having hearing aid   communication board      Hip pain -AVN -appreciated Dr. Vikki Best saw patient yesterday.  Pt talked with wife, he decided to have surgery done   Will have cardiologist/pulmonologist on board for surgical clearance     Subjective: I can not walk, I talked with wife, I know the risk. I want to have surgery done  Nasal congestion       Disposition :tbd,   Review of systems:    General: No fevers or chills. Cardiovascular: No chest pain or pressure. No palpitations. Pulmonary: No shortness of breath. Gastrointestinal: No nausea, vomiting. Msk: left hip pain     Vital signs/Intake and Output:  Visit Vitals  /63   Pulse 86   Temp 97.6 °F (36.4 °C)   Resp 22   Ht 5' 7\" (1.702 m)   Wt 104.8 kg (231 lb)   SpO2 100%   BMI 36.18 kg/m²     Current Shift:  04/07 0701 - 04/07 1900  In: 360 [P.O.:360]  Out: 300 [Urine:300]  Last three shifts:  04/05 1901 - 04/07 0700  In: -   Out: 8156 [Urine:1775]    Physical Exam:  General: WD, WN. Alert, cooperative, no acute distress    HEENT: NC, Atraumatic. PERRLA, anicteric sclerae. Lungs: CTA Bilaterally. No Wheezing/Rhonchi/Rales. Heart:  Regular  rhythm,  No murmur, No Rubs, No Gallops  Abdomen: Soft, Non distended, Non tender. +Bowel sounds,   Extremities: No c/c mild edema   Psych:   Not anxious or agitated. Neurologic:  No acute neurological deficit. Labs: Results:       Chemistry Recent Labs     04/07/21 0055 04/06/21  0505 04/05/21  1330   * 149* 109*   * 126* 126*   K 3.2* 4.0 2.8*   CL 91* 89* 85*   CO2 28 27 32   BUN 39* 31* 24*   CREA 1.56* 1.48* 1.29   CA 8.4* 8.7 9.3   AGAP 11 10 9   BUCR 25* 21* 19   ALB  --   --  3.5      CBC w/Diff Recent Labs     04/07/21 0055 04/06/21  0505 04/05/21  1330   WBC 14.4* 11.4 13.1   RBC 3.30* 3.54* 4.16*   HGB 9.7* 10.1* 12.2*   HCT 28.0* 29.8* 35.3*    393 391   GRANS 91* 94* 80*   LYMPH 4* 5* 9*   EOS 0 0 4      Cardiac Enzymes Recent Labs     04/05/21  1330      CKND1 3.4      Coagulation No results for input(s): PTP, INR, APTT, INREXT, INREXT in the last 72 hours.     Lipid Panel No results found for: CHOL, CHOLPOCT, CHOLX, CHLST, CHOLV, 547834, HDL, HDLP, LDL, LDLC, DLDLP, 879228, VLDLC, VLDL, TGLX, TRIGL, TRIGP, TGLPOCT, CHHD, CHHDX BNP No results for input(s): BNPP in the last 72 hours. Liver Enzymes Recent Labs     04/05/21  1330   ALB 3.5      Thyroid Studies Lab Results   Component Value Date/Time    TSH 3.06 04/05/2021 01:30 PM        Procedures/imaging: see electronic medical records for all procedures/Xrays and details which were not copied into this note but were reviewed prior to creation of Plan    Xr Hip Lt W Or Wo Pelv 2-3 Vws    Result Date: 4/5/2021  EXAM: LEFT HIP RADIOGRAPHS CLINICAL INDICATION/HISTORY: left hip pain, difficulty ambulating -Additional: None COMPARISON: Supine with 2/26/2021 TECHNIQUE: AP pelvis and frog-leg lateral view of left hip. _______________ FINDINGS: BONES: Severe left hip osteoarthrosis with femoral head remodeling and collapse. No evidence of acute displaced fracture or dislocation. SOFT TISSUES: Unremarkable. _______________     Severe left hip osteoarthrosis with femoral head collapse/remodeling. Sclerosis and lucency in femoral head may reflect collapse/remodeling versus AVN. Consider MRI in the appropriate clinical setting. No evidence of acute fracture or dislocation. Xr Knee Lt Max 2 Vws    Result Date: 4/5/2021  HISTORY: -Provided on order: pain, difficulty ambulating -Additional: None Technique: 2 views of left knee are presented for interpretation. Comparison study: none. Findings: The bones are osteopenic. There is no plain film evident fracture or dislocation. No radiopaque foreign body or significant arthropathy. No significant arthropathy. No plain film evident knee joint effusion is seen. 1. No acute bony abnormality is identified by plain films. Intrinsic knee ligamentous, meniscal and cartilaginous integrity can be best evaluated by routine knee MRI if clinically warranted. Xr Chest Port    Result Date: 4/5/2021  HISTORY: -Provided with order: SOB -Additional: Bilateral lower extremity swelling.  Technique : AP PORTABLE CHEST Comparison : 02/28/21 FINDINGS: Patient is rotated to the right. The chin obscures the upper thorax. There is mild motion degradation and external artifact which additionally limits visibility. HEART AND MEDIASTINUM: Unremarkable. LUNGS AND PLEURAL SPACES: Calcified pleural plaques present on the right. Vague opacity in the right infrahilar region is less evident than the prior, but degraded by motion. There is stable elevation of the right diaphragm. BONY THORAX AND SOFT TISSUES: No acute osseous abnormality. Improved aeration with mild, less evident ill-defined opacity in the right infrahilar region, which can reflect chronic atelectasis or infiltrate. No additional change. Duplex Lower Ext Venous Bilat    Result Date: 4/5/2021  · No evidence of deep vein thrombosis in the right lower extremity veins assessed. · No evidence of deep vein thrombosis in the left lower extremity veins assessed.         Sam Russell MD

## 2021-04-07 NOTE — PROGRESS NOTES
Problem: Chronic Obstructive Pulmonary Disease (COPD)  Goal: *Oxygen saturation during activity within specified parameters  Outcome: Progressing Towards Goal  Goal: *Able to remain out of bed as prescribed  Outcome: Progressing Towards Goal  Goal: *Absence of hypoxia  Outcome: Progressing Towards Goal  Goal: *Optimize nutritional status  Outcome: Progressing Towards Goal     Problem: Falls - Risk of  Goal: *Absence of Falls  Description: Document Darell Fall Risk and appropriate interventions in the flowsheet.   Outcome: Progressing Towards Goal  Note: Fall Risk Interventions:  Mobility Interventions: Assess mobility with egress test, PT Consult for mobility concerns, OT consult for ADLs         Medication Interventions: Bed/chair exit alarm    Elimination Interventions: Call light in reach

## 2021-04-07 NOTE — PROGRESS NOTES
Pharmacy Dosing Services: Renal Dosing    The following medication: Pepcid was automatically dose-adjusted per THE [facility] P&T Committee Protocol, with respect to renal function. Consult provided for this   66 y.o. , male , for the indication of GERD. Pt Weight:   Wt Readings from Last 1 Encounters:   04/05/21 104.8 kg (231 lb)         Previous Regimen Pepcid 20 mg tab BID   Serum Creatinine Lab Results   Component Value Date/Time    Creatinine 1.56 (H) 04/07/2021 12:55 AM    Creatinine, POC 1.3 10/30/2019 03:27 PM       Creatinine Clearance Estimated Creatinine Clearance: 45.8 mL/min (A) (based on SCr of 1.56 mg/dL (H)). BUN Lab Results   Component Value Date/Time    BUN 39 (H) 04/07/2021 12:55 AM    BUN, POC 30 (H) 10/30/2019 03:27 PM       Dosage changed to:  Pepcid 20 mg tab daily    Pharmacy to continue to monitor patient daily. Will make dosage adjustments based upon changing renal function.   Signed Millie Garcia, [address] Jaime

## 2021-04-07 NOTE — PROGRESS NOTES
0435-2539 Shift Summary: Pt rested poorly overnight and was up and down on the side of the bed constantly. He would not talk about what was bothering him until this morning when he expressed worry over possibly needing hip surgery with COPD. He denied any pain overnight and stated if he positioned himself well he wasn't in pain but had 10/10 pain when ambulating. No physical clinical concerns noted overnight.      Nightshift Chart Audit Completed

## 2021-04-07 NOTE — PROGRESS NOTES
Physical Therapy Note:  Chart review completed. Spoke with CAITLIN Tracey who reports pt was seen last night (4/6/21) by ortho and told that he needs to get cardiology and pulmonary clearance to proceed with L hip surgery. PT evaluation held today; RN made aware.  Thank you for this referral.  Destiny Torres, PT

## 2021-04-07 NOTE — ROUTINE PROCESS
Bedside and Verbal shift change report given to Zoey TUCKER  (oncoming nurse) by Ginny Eldridge RN  (offgoing nurse). Report given with SBAR, Kardex, Intake/Output and Recent Results.

## 2021-04-07 NOTE — PROGRESS NOTES
0719 : assumed care of patient from [address] : morning meds and shift assessment completed. 1014 : Dr. Alison Forrester in room speaking with patient, This RN in room to assist with communication. Pt stated one of his hearing aid batteries has . 1255 : Reassessment completed, no changes to shift assessment. 1400 pt visibly upset regarding upcoming potential surgery. This RN sat with patient and listened to concerns. Pt very nervous awaiting cardio consult. 1624 : reassessment completed, no changes to shift assessment, pt sitting on edge of bed.     1900 : Bedside shift change report given to 103Georgia Silva (oncoming nurse) by Raza Oswald RN (offgoing nurse). Report included the following information SBAR, Kardex, Intake/Output and MAR.

## 2021-04-07 NOTE — ROUTINE PROCESS
1920 Bedside and Verbal shift change report given to GREGORIO Del Castillo Cha (oncoming nurse) by Flores Xiong RN (offgoing nurse). Report included the following information SBAR, Kardex, MAR, Accordion and Recent Results. 2040 Report given to PAUL Mendoza RN.

## 2021-04-07 NOTE — PROGRESS NOTES
Problem: Chronic Obstructive Pulmonary Disease (COPD)  Goal: *Oxygen saturation during activity within specified parameters  Outcome: Progressing Towards Goal  Goal: *Able to remain out of bed as prescribed  Outcome: Progressing Towards Goal  Goal: *Absence of hypoxia  Outcome: Progressing Towards Goal  Goal: *Optimize nutritional status  Outcome: Progressing Towards Goal     Problem: Patient Education: Go to Patient Education Activity  Goal: Patient/Family Education  Outcome: Progressing Towards Goal     Problem: Falls - Risk of  Goal: *Absence of Falls  Description: Document Darell Fall Risk and appropriate interventions in the flowsheet.   Outcome: Progressing Towards Goal  Note: Fall Risk Interventions:  Mobility Interventions: Assess mobility with egress test, PT Consult for mobility concerns, OT consult for ADLs         Medication Interventions: Patient to call before getting OOB, Teach patient to arise slowly    Elimination Interventions: Call light in reach, Patient to call for help with toileting needs              Problem: Patient Education: Go to Patient Education Activity  Goal: Patient/Family Education  Outcome: Progressing Towards Goal     Problem: Pain  Goal: *Control of Pain  Outcome: Progressing Towards Goal  Goal: *PALLIATIVE CARE:  Alleviation of Pain  Outcome: Progressing Towards Goal     Problem: Patient Education: Go to Patient Education Activity  Goal: Patient/Family Education  Outcome: Progressing Towards Goal     Problem: Fluid Volume - Risk of, Imbalanced  Goal: *Balanced intake and output  Outcome: Progressing Towards Goal     Problem: Patient Education: Go to Patient Education Activity  Goal: Patient/Family Education  Outcome: Progressing Towards Goal

## 2021-04-07 NOTE — CONSULTS
Pulmonary and Sleep Medicine     Pulmonary Note    Patient: Kingsley Hinson               Sex: male          DOA: 4/5/2021       YOB: 1945      Age:  66 y.o.        LOS:  LOS: 2 days        Reason for Consult: COPD exacerbation, pre-op pulmonary clearance    IMPRESSION:   Patient Active Problem List   Diagnosis Code    Hypertension I10    COPD (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 ([facility]) J44.9    Chronic renal disease, stage 3, moderately decreased glomerular filtration rate between 30-59 mL/min/1.73 square meter ([facility]) N18.30    Asbestosis ([facility] J61    Acute exacerbation of chronic obstructive pulmonary disease (COPD) ([facility] J44.1    Debility R53.81    Paroxysmal atrial fibrillation ([facility]) I48.0    Chronic respiratory failure with hypoxia ([facility]) J96.11    Tobacco dependence F17.200    Hyponatremia E87.1    Pleural effusion, right J90    COPD with acute exacerbation ([facility] J44.1    Acute respiratory failure with hypoxia and hypercarbia ([facility]) J96.01, J96.02    Hyponatremia E87.1    Advanced care planning/counseling discussion Z71.89    Hypokalemia E87.6    COPD (chronic obstructive pulmonary disease) ([facility]) J44.9    CAP (community acquired pneumonia) J18.9    Respiratory distress R06.03    COPD exacerbation ([facility]) J44.1    Atrial fibrillation with RVR ([facility]) I48.91    Acute respiratory failure ([facility]) J96.00    Left hip pain M25.552    PAF (paroxysmal atrial fibrillation) ([facility]) I48.0 ·   Pre-op pulmonary exam- Z01.811   RECOMMENDATIONS:   Continue supplemental O2 via NC, titrate flow for goal SPO2> 91%  XR chest 4/5/21- nil acute; no pneumonia  ABG from 4/5/21 reviewed with chronic hypoxia  Pt previously has been on ventilator an last episode 02/21 - extubated after 3 day on ventilator.  Previously used BiPAP for COPD exacerbation  He takes Stiolto and Pulmicort neb for COPD at home  He is on O2 at 2 Lpm at home  Former smoker, quit about 1.5 yrs ago  PFT 6/29/20 in our office with his regular pulmonologist showed - FEV1 54% [GOLD CLASS 2], FEV1% 58, %, %, DLCO 67% - moderate COPD with hyperinflation and reduced DLCO    Present COPD exacerbation appears to be mild and clinically improving  Patient is moderately high risk for post-op pulmonary complication   He may need urgent surgery as patients hip pain limiting his active lifestyle which may adversely affect his COPD as well  His risk cannot be further diminished. Be alert for perioperative complications such as aspiration or nosocomial pneumonia, respiratory failure, prolonged mechanical ventilation, atelectasis, wound healing on steroids. Surgical decision should be based on acceptance of such risks and potential benefits of surgery between orthopedics and the patient. \"I want to take my chances. I can't live my life in wheelchair. \"  He would need rachid-operative O2 supplement, bronchodilators, DVT prophylaxis per surgery, judicious aqiuborw-hrujvrndhy-oskwad mediation/s, aggressive pulmonary toilet, airways monitoring with aspiration precautions and HOB30'. Early mobilization, regular use of incentive spirometry should be goals- patient agrees. If option of regional anesthesia available over [place] then should be considered  If unable to be extubated post-op, then monitored in ICU post-op and planned extubation could be done. Our pulmonary group would be happy to assist as needed post operatively. Start Atrovent QID and continue budesonide, Brovana nebs  Pulmonary hygiene care, IS when awake  Steroids - taper as much possible per clinical course  Antibiotic choice: Rocephin, Azithromycin    Aspiration prevention bundle, head of the bed at 30' all times  Glycemic control as needed while on steroids  Stress ulcer and DVT prophylaxis    I spoke with wife Edgar Arevalo at he listed number [phone] on chart and discussed above.  She feels pt in distress from hip pain and would support 's decision  PT and OT  Palliative care consult following. Partial DNR noted. Will defer respective systems problem management to primary and other consultant and follow patient with primary and other team  Further recommendations will be based on the patient's response to recommended treatment and results of the investigation ordered. HPI:   Mr. Agustin Wallace is a 66 y.o. male who is seen at the request of Dr. Ashley Delacruz for COPD exacerbation, pre-op respiratory exam. He is know to my associate Dr. Maral Ware and follows with him for COPD. He presented with PMHx of PAF, COPD, asbestos pleural plaque, chronic respiratory failure with hypoxia 2 L O2 at home came to ER due to hip pain and SOB worsening in 1-2 weeks PTA, associated with cough, wheezing. He received iv steroid and breathing tx in ER, while x-ray of chest no acute process. ABG on room air showed hypoxia. He has remained on steroid and bronchodilator therapy. Reports feeling improving close to baseline. During this hospitalization w/up for LT hip pain showed avascular necrosis and orthopedics surgery is consulted for their opinion about THR. The patient denies fever, chills, chest pain, hemoptysis, sore throat, sepsis recently. He feels cough is now intermittent, improved wheezing and improving dyspnea.       Review of Systems   General ROS: negative for - fever, chills, weight loss, fatigue and malaise  Psychological ROS: positive for - anxiety, negative for - depression  Ophthalmic ROS: negative for - eye pain, loss of vision or photophobia  ENT ROS: negative for - headaches, sinus pain or vocal changes  Allergy and Immunology ROS: negative for - hives or postnasal drip  Hematological and Lymphatic ROS: negative for - bleeding problems, blood clots, jaundice, pallor or swollen lymph nodes  Endocrine ROS: negative for - skin changes, temperature intolerance or unexpected weight changes  Cardiovascular ROS: negative for - chest pain, edema, murmur, orthopnea, palpitations or pnd  Gastrointestinal ROS: no nausea, vomiting, abdominal pain, change in bowel habits, or black or bloody stools  Genito-Urinary ROS: no dysuria, trouble voiding, or hematuria  Musculoskeletal ROS: negative for - muscle pain or muscular weakness; positive for - hip pain  Neurological ROS: no TIA or stroke symptoms  Dermatological ROS: negative for - pruritus, rash or skin lesion changes       Past Medical History  Past Medical History:   Diagnosis Date    Brain aneurysm     Cancer ([facility]     prostate    COPD [facility]ch[facility] disease) [facility]     Hypertension     Nocturia     Pneumonia     Radiation effect        Past Surgical History  Past Surgical History:   Procedure Laterality Date    HX HERNIA REPAIR         Family History  Family History   Problem Relation Age of Onset    Heart Disease Mother        Social History  Social History     Tobacco Use    Smoking status: Former Smoker     Types: Cigarettes    Smokeless tobacco: Never Used   Substance Use Topics    Alcohol use: No    Drug use: Never        Medications  Current Facility-Administered Medications   Medication Dose Route Frequency    potassium chloride (K-DUR, KLOR-CON) SR tablet 40 mEq  40 mEq Oral BID    sodium chloride (OCEAN) 0.65 % nasal squeeze bottle 2 Spray  2 Spray Both Nostrils Q2H PRN    methylPREDNISolone (PF) (Solu-MEDROL) injection 40 mg  40 mg IntraVENous Q12H    [START ON 4/8/2021] famotidine (PEPCID) tablet 20 mg  20 mg Oral DAILY    amLODIPine (NORVASC) tablet 5 mg  5 mg Oral DAILY    tamsulosin (FLOMAX) capsule 0.4 mg  0.4 mg Oral QPM    dilTIAZem IR (CARDIZEM) tablet 30 mg  30 mg Oral TIDAC    furosemide (LASIX) tablet 20 mg  20 mg Oral DAILY    azithromycin (ZITHROMAX) tablet 500 mg  500 mg Oral Q24H    cefTRIAXone (ROCEPHIN) 1 g in sterile water (preservative free) 10 mL IV syringe  1 g IntraVENous Q24H    sodium chloride (NS) flush 5-40 mL  5-40 mL IntraVENous Q8H    sodium chloride (NS) flush 5-40 mL  5-40 mL IntraVENous PRN    acetaminophen (TYLENOL) tablet 650 mg  650 mg Oral Q6H PRN    Or    acetaminophen (TYLENOL) suppository 650 mg  650 mg Rectal Q6H PRN    bisacodyL (DULCOLAX) suppository 10 mg  10 mg Rectal DAILY PRN    promethazine (PHENERGAN) tablet 12.5 mg  12.5 mg Oral Q6H PRN    Or    ondansetron (ZOFRAN) injection 4 mg  4 mg IntraVENous Q6H PRN    enoxaparin (LOVENOX) injection 40 mg  40 mg SubCUTAneous DAILY    budesonide (PULMICORT) 500 mcg/2 ml nebulizer suspension  500 mcg Nebulization BID RT    arformoteroL (BROVANA) neb solution 15 mcg  15 mcg Nebulization BID RT    albuterol-ipratropium (DUO-NEB) 2.5 MG-0.5 MG/3 ML  3 mL Nebulization Q4H PRN     Prior to Admission medications    Medication Sig Start Date End Date Taking? Authorizing Provider   predniSONE (STERAPRED DS) 10 mg dose pack Take 6 tablets p.o. daily x1 day, take 5 tablets p.o. daily x1 day, take 4 tablets p.o. daily x1 day, take 3 tabs p.o. daily x1 day, take 2 tablets p.o. daily x1 day, and take 1 tablet p.o. daily x1 day. 3/2/21   Tara Zapata MD   amLODIPine (NORVASC) 5 mg tablet Take 1 Tab by mouth daily. 3/3/21   Tara Zapata MD   budesonide (PULMICORT) 0.5 mg/2 mL nbsp 2 mL by Nebulization route two (2) times a day. 2/7/21   Agustin Jordan MD   albuterol-ipratropium (DUO-NEB) 2.5 mg-0.5 mg/3 ml nebu 3 mL by Nebulization route every four (4) hours as needed for Wheezing. 2/7/21   Agustin Jordan MD   albuterol (PROVENTIL HFA, VENTOLIN HFA, PROAIR HFA) 90 mcg/actuation inhaler Take 2 Puffs by inhalation every four (4) hours as needed for Wheezing or Shortness of Breath. 2/7/21   Agustin Jordan MD   arformoteroL (BROVANA) 15 mcg/2 mL nebu neb solution 2 mL by Nebulization route two (2) times a day. 2/7/21   Agustin Jordan MD   OXYGEN-AIR DELIVERY SYSTEMS 2 L/min by Nasal route continuous.     Provider, Historical   furosemide (Lasix) 20 mg tablet Take 20 mg by mouth daily. Provider, Historical   dilTIAZem (CARDIZEM) 30 mg tablet Take 1 Tab by mouth Before breakfast, lunch, and dinner. Patient taking differently: Take 30 mg by mouth daily. Daily 20   Desiree Naik MD   acetaminophen (TYLENOL) 325 mg tablet Take 650 mg by mouth every eight (8) hours as needed for Pain. Provider, Historical   dextran 70/hypromellose (ARTIFICIAL TEARS, PF, OP) Apply 2 Drops to eye as needed for Other (both eyes for dryness). Provider, Historical   diphenhydrAMINE (BENADRYL) 25 mg capsule Take 25 mg by mouth nightly as needed for Itching. Provider, Historical   tamsulosin (FLOMAX) 0.4 mg capsule Take 0.4 mg by mouth every evening. Other, MD Blayne       Allergy  Allergies   Allergen Reactions    Aspirin Other (comments)     \"messes my stomach up\"       Physical Exam:   Vital Signs:    Blood pressure 128/70, pulse 81, temperature 97.2 °F (36.2 °C), resp. rate 22, height 5' 7\" (1.702 m), weight 104.8 kg (231 lb), SpO2 98 %. Body mass index is 36.18 kg/m².     O2 Device: Nasal cannula   O2 Flow Rate (L/min): 2 l/min   Temp (24hrs), Av.5 °F (36.4 °C), Min:97.2 °F (36.2 °C), Max:97.7 °F (36.5 °C)       Intake/Output:   Last shift:      701 - 1900  In: 540 [P.O.:540]  Out: 625 [Urine:625]  Last 3 shifts: 1901 -  07  In: -   Out: 3955 [Urine:1775]    Intake/Output Summary (Last 24 hours) at 2021 1356  Last data filed at 2021 1225  Gross per 24 hour   Intake 540 ml   Output 2100 ml   Net -1560 ml      General: in no respiratory distress and acyanotic, alert and oriented times 3, no distress, appears stated age, moderately obese, on O2 via NC  HEENT: PERRLA, EOMI, fundi benign, throat normal without erythema or exudate  Neck: No abnormally enlarged lymph nodes or thyroid, supple  Chest: increased AP diameter  Lungs: decreased air exchange bilaterally, no wheezing bl, normal percussion bilaterally, no tenderness/ rash  Heart: Regular rate and rhythm, S1S2 present or without murmur or extra heart sounds  Abdomen: protuberant, bowel sounds normoactive, tympanic, abdomen is soft without significant tenderness, masses, organomegaly or guarding  Extremity: negative for edema, cyanosis, clubbing  Neuro: alert, oriented x 3, no defects noted in general exam.  Skin: Skin color, texture, turgor normal. No rashes or lesions    Labs Reviewed:  Recent Results (from the past 24 hour(s))   METABOLIC PANEL, BASIC    Collection Time: 04/07/21 12:55 AM   Result Value Ref Range    Sodium 130 (L) 136 - 145 mmol/L    Potassium 3.2 (L) 3.5 - 5.5 mmol/L    Chloride 91 (L) 100 - 111 mmol/L    CO2 28 21 - 32 mmol/L    Anion gap 11 3.0 - 18 mmol/L    Glucose 163 (H) 74 - 99 mg/dL    BUN 39 (H) 7.0 - 18 MG/DL    Creatinine 1.56 (H) 0.6 - 1.3 MG/DL    BUN/Creatinine ratio 25 (H) 12 - 20      GFR est AA 53 (L) >60 ml/min/1.73m2    GFR est non-AA 43 (L) >60 ml/min/1.73m2    Calcium 8.4 (L) 8.5 - 10.1 MG/DL   CBC WITH AUTOMATED DIFF    Collection Time: 04/07/21 12:55 AM   Result Value Ref Range    WBC 14.4 (H) 4.6 - 13.2 K/uL    RBC 3.30 (L) 4.35 - 5.65 M/uL    HGB 9.7 (L) 13.0 - 16.0 g/dL    HCT 28.0 (L) 36.0 - 48.0 %    MCV 84.8 74.0 - 97.0 FL    MCH 29.4 24.0 - 34.0 PG    MCHC 34.6 31.0 - 37.0 g/dL    RDW 13.5 11.6 - 14.5 %    PLATELET 248 455 - 210 K/uL    MPV 8.6 (L) 9.2 - 11.8 FL    NEUTROPHILS 91 (H) 40 - 73 %    LYMPHOCYTES 4 (L) 21 - 52 %    MONOCYTES 3 3 - 10 %    EOSINOPHILS 0 0 - 5 %    BASOPHILS 0 0 - 2 %    ABS. NEUTROPHILS 13.1 (H) 1.8 - 8.0 K/UL    ABS. LYMPHOCYTES 0.6 (L) 0.9 - 3.6 K/UL    ABS. MONOCYTES 0.5 0.05 - 1.2 K/UL    ABS. EOSINOPHILS 0.0 0.0 - 0.4 K/UL    ABS.  BASOPHILS 0.0 0.0 - 0.1 K/UL    DF AUTOMATED     SODIUM, UR, RANDOM    Collection Time: 04/07/21  2:20 AM   Result Value Ref Range    Sodium,urine random 29 20 - 110 MMOL/L           Recent Labs     04/05/21  1731   FIO2I 21   HCO3I 29.7*   PCO2I 40.5   PHI 7.47*   PO2I 71*       All Micro Results     Procedure Component Value Units Date/Time    COVID-19 RAPID TEST [[id number]] Collected: 04/05/21 1539    Order Status: Completed Specimen: Nasopharyngeal Updated: 04/05/21 1616     Specimen source Nasopharyngeal        COVID-19 rapid test Not detected        Comment: Rapid Abbott ID Now       Rapid NAAT:  The specimen is NEGATIVE for SARS-CoV-2, the novel coronavirus associated with COVID-19. Negative results should be treated as presumptive and, if inconsistent with clinical signs and symptoms or necessary for patient management, should be tested with an alternative molecular assay. Negative results do not preclude SARS-CoV-2 infection and should not be used as the sole basis for patient management decisions. This test has been authorized by the FDA under an Emergency Use Authorization (EUA) for use by authorized laboratories. Fact sheet for Healthcare Providers: [employer]  Fact sheet for Patients: [employer]       Methodology: Isothermal Nucleic Acid Amplification                 2/27/21 ECHO   · Left Ventricle: Normal cavity size, wall thickness and systolic function (ejection fraction normal). The estimated EF is 50 - 55%. There is mild (grade 1) left ventricular diastolic dysfunction E'E= 10.05. Poor resolution of endocardial border. Can not comment on wall motion abnormality  · Tricuspid Valve: Tricuspid valve not well visualized. No stenosis. Tricuspid regurgitation is inadequate for estimation of right ventricular systolic pressure      Imaging:  [x]I have personally reviewed the patients chest radiographs images and report with the patient  4/5/21  AP PORTABLE CHEST 4/5/21   Comparison : 02/28/21    FINDINGS: Patient is rotated to the right. The chin obscures the upper thorax.   There is mild motion degradation and external artifact which additionally limits visibility.     HEART AND MEDIASTINUM: Unremarkable.     LUNGS AND PLEURAL SPACES: Calcified pleural plaques present on the right. Vague  opacity in the right infrahilar region is less evident than the prior, but  degraded by motion. There is stable elevation of the right diaphragm.     BONY THORAX AND SOFT TISSUES: No acute osseous abnormality.     IMPRESSION     Improved aeration with mild, less evident ill-defined opacity in the right  infrahilar region, which can reflect chronic atelectasis or infiltrate. No  additional change. Results from [place] encounter on 04/05/21   XR KNEE LT MAX 2 VWS    Narrative HISTORY:   -Provided on order: pain, difficulty ambulating  -Additional: None    Technique: 2 views of left knee are presented for interpretation. Comparison study: none. Findings: The bones are osteopenic. There is no plain film evident fracture or  dislocation. No radiopaque foreign body or significant arthropathy. No  significant arthropathy. No plain film evident knee joint effusion is seen. Impression 1. No acute bony abnormality is identified by plain films. Intrinsic knee ligamentous, meniscal and cartilaginous integrity can be best  evaluated by routine knee MRI if clinically warranted. Results from [place] encounter on 04/05/21   CT HIP LT WO CONT    Narrative ---------------------------------------------------------------------------  <<<<<<<<<           [facility]           >>>>>>>>>   ---------------------------------------------------------------------------    HISTORY:   -From Provider:r/o lavernen, not candidate for mri  -Additional: None    COMPARISON EXAMINATIONS:   None. TECHNIQUE:   CT of the left hip without intravenous contrast administration. Coronal and sagittal reformats were generated and reviewed.     One or more dose reduction techniques were used on this CT: automated exposure  control, adjustment of the mAs and/or kVp according to patient size, and  iterative reconstruction techniques. The specific techniques used on this CT  exam have been documented in the patient's electronic medical record.      --------------------------     FINDINGS    --------------------------    OSSEOUS STRUCTURES:    Left hip: There is irregular subchondral lucency and cortical irregularity with  slight flattening/collapse of the left femoral head. Severe degenerative changes  in the right hip with severe joint space narrowing, subchondral cysts and slight  marginal spurring. Soft tissue thickening about the joint space suggesting hip  joint effusion. Visualized bony pelvis and proximal femur: Degenerative changes in the SI joints  and spine. Relatively severe central and foraminal stenosis at L4/5 and to  lesser degree at L3/4. Visualized pelvic soft tissues:  LYMPH NODES:  Subcentimeter short axis left groin lymph nodes are present. [ ]  GI TRACT:  Colonic diverticulosis, without CT evidence diverticulitis. Feculent  distention of the rectum. PELVIC ORGANS:  The visualized portion of the bladder is unremarkable. VASCULATURE:  Atherosclerotic calcification in visualized left iliac and femoral  arteries. OTHER:   No ascites or free intraperitoneal air in visualized extent. Fat-containing left inguinal hernia. Fatty change in the gluteal musculature  bilaterally. Subcutaneous stranding/edema along left more than right lateral  flank.      ---------------------------------------------------------------------------    Impression ---------------------------------------------------------------------------    1. Subchondral serpiginous lucencies in the weightbearing left femoral head  suspicious for avascular necrosis, with associated cortical irregularity in  keeping with flattening/collapse. Suspected left hip joint effusion. 2. Relatively severe mild degenerative changes in the left hip. Mild  degenerative changes in the right hip.  Degenerative changes in the SI joints and  spine. In the setting of radiculopathy, routine lumbar spine MRI could best  further assess. [x]See my orders for details    My assessment, plan of care, findings, medications, side effects etc were discussed with:  [x]nursing []PT/OT    []respiratory therapy [x]Dr. Claire Malloy [x]Patient     [x]Total care time exclusive of procedures 60 minutes with complex decision making performed and > 50% time spent in face to face consultation.     Sapphire Miller MD

## 2021-04-07 NOTE — CONSULTS
Palliative Medicine Consult    Patient Name: Luke Fernandes  YOB: 1945    Date of Initial Consult: April 7, 2021  Reason for Consult: Goals of care discussions  Requesting Provider: Dr. Regla Alexander  Primary Care Physician: Junior Noe MD      SUMMARY:   Luke Fernandes is a 66 y.o. with a past history of PAF, COPD, asbetosis, chronic respiratory failure with hypoxia 2 L, who was admitted on 4/5/2021 from home with a diagnosis of COPD exacerbation, hyponatremia, hypokalemia, and left hip pain. Current medical issues leading to Palliative Medicine involvement include: Support and goals of care discussions. PALLIATIVE DIAGNOSES:   1. Goals of care discussions  2. Acute on chronic COPD  3. Left hip pain  4. Advanced age/debility       PLAN:   1. Goals of care discussions: Palliative medicine team including Meghann Szymanski RN and I met with patient and patient's bedside. Patient is awake, alert, sitting up on the edge of the bed and crying. Support offered as patient shares he is a little sad and anxious about needing left hip surgery because he knows he is older in age and a high risk surgical candidate due to his chronic comorbidities. He states he is not afraid to die, but he is sad of the idea of leaving his family behind. Empathetic listening provided. Patient states he is willing to go through with a hip surgery, because as of right now he cannot walk due to excruciating pain to the left hip upon ambulation. He states he would rather take the risk of surgery then to live the rest of his life wheelchair-bound. Support offered. Palliative team met with patient yesterday and reviewed current AMD and POST form which remains his current wishes. Patient is a Partial code: DNR in the event of cardiopulmonary arrest, okay with intubation prearrest (2-week trial, no tracheostomy), no feeding tubes. We will continue to follow for support. 2. Acute on chronic COPD: COPD exacerbation.   Weaning steroids, mildly improving, known history of asbestosis and chronic respiratory failure with hypoxia, he is on home O2 at 2 L. Able to talk in complete sentences and breathing comfortably even when crying. 3. Left hip pain: Ortho consulted. Per patient, he needs hip surgery because \"the hip is gone\". 4. Advanced age/debility: 13-year-old male who lives with his wife, Kavitha Alvarado. He has five children. He lives in a single floor home and usually independent with ADLs but does use assistive devices including a cane and a walker for ambulatory assist.  Has not been able to drive recently due to shortness of breath and debility. 5. Initial consult note routed to primary continuity provider  6. Communicated plan of care with: Palliative IDT       GOALS OF CARE / TREATMENT PREFERENCES:   [====Goals of Care====]  GOALS OF CARE: Partial code: DNR in the event of cardiopulmonary arrest, okay with intubation pre-arrest (2-week trial, no tracheostomy), no feeding tubes  Patient/Health Care Proxy Stated Goals: Prolong life      TREATMENT PREFERENCES:   Code Status: Partial Code    Advance Care Planning:  Advance Care Planning 4/5/2021   Patient's Healthcare Decision Maker is: -   Primary Decision Maker Name -   Primary Decision Maker Phone Number -   Primary Decision Maker Relationship to Patient -   Confirm Advance Directive Yes, on file   Patient Would Like to Complete Advance Directive -   Does the patient have other document types -       Medical Interventions: Full interventions           The palliative care team has discussed with patient / health care proxy about goals of care / treatment preferences for patient.  [====Goals of Care====]         HISTORY:     History obtained from: Patient, chart    CHIEF COMPLAINT: Anxious about the potential need for surgery for his left hip, because he knows he is a high risk surgical candidate but he is willing to do the surgery so that he can be ambulatory again.     HPI/SUBJECTIVE: The patient is:   [x] Verbal and participatory  [] Non-participatory due to:   Oriented x4    Clinical Pain Assessment (nonverbal scale for severity on nonverbal patients):   Clinical Pain Assessment  Severity: 0            FUNCTIONAL ASSESSMENT:     Palliative Performance Scale (PPS):  PPS: 50       PSYCHOSOCIAL/SPIRITUAL SCREENING:     Advance Care Planning:  Advance Care Planning 4/5/2021   Patient's Healthcare Decision Maker is: -   Primary Decision Maker Name -   Primary Decision Maker Phone Number -   Primary Decision Maker Relationship to Patient -   Confirm Advance Directive Yes, on file   Patient Would Like to Complete Advance Directive -   Does the patient have other document types -        Any spiritual / Zoroastrianism concerns:  [] Yes /  [x] No    Caregiver Burnout:  [] Yes /  [] No /  [x] No Caregiver Present      Anticipatory grief assessment:   [x] Normal  / [] Maladaptive              REVIEW OF SYSTEMS:     Positive and pertinent negative findings in ROS are noted above in HPI. The following systems were [x] reviewed / [] unable to be reviewed as noted in HPI  Other findings are noted below. Systems: constitutional, ears/nose/mouth/throat, respiratory, gastrointestinal, genitourinary, musculoskeletal, integumentary, neurologic, psychiatric, endocrine. Positive findings noted below. Modified ESAS Completed by: provider           Pain: 0(No pain at rest, but pain 10 out of 10 with ambulation to left hip)   Anxiety: 4(States he needs hip surgery, but afraid because he knows he is a high risk surgical candidate) Nausea: 0     Dyspnea: 0     Constipation: No              PHYSICAL EXAM:     From RN flowsheet:  Wt Readings from Last 3 Encounters:   04/05/21 104.8 kg (231 lb)   04/06/21 104.8 kg (231 lb 0.7 oz)   03/01/21 94.2 kg (207 lb 10.8 oz)     Blood pressure 139/63, pulse 86, temperature 97.6 °F (36.4 °C), resp. rate 22, height 5' 7\" (1.702 m), weight 104.8 kg (231 lb), SpO2 100 %.     Pain Scale 1: Numeric (0 - 10)  Pain Intensity 1: 0     Pain Location 1: Head  Pain Orientation 1: Left  Pain Description 1: Aching  Pain Intervention(s) 1: Medication (see MAR)      Constitutional: Awake, alert, sitting up on edge of bed, crying  Eyes: pupils equal, anicteric  ENMT: no nasal discharge, moist mucous membranes  Cardiovascular: regular rhythm  Respiratory: breathing not labored, symmetric  Musculoskeletal: no deformity  Skin: warm, dry  Neurologic: following commands, moving all extremities, oriented x4  Psychiatric: full affect, no hallucinations, tearful       HISTORY:     Principal Problem:    Acute exacerbation of chronic obstructive pulmonary disease (COPD) ([facility] (2/8/2019)    Active Problems:    Hypertension ()      Asbestosis ([facility] [facility] (10/19/2018)      Debility (7/8/2019)      Hyponatremia (4/10/2020)      Hypokalemia (4/14/2020)      Left hip pain (4/5/2021)      PAF (paroxysmal atrial fibrillation) ([facility] (4/5/2021)      Past Medical History:   Diagnosis Date    Brain aneurysm     Cancer ([facility]     prostate    COPD (chronic obstructive pulmonary disease) ([facility] [facility]     Hypertension     Nocturia     Pneumonia     Radiation effect       Past Surgical History:   Procedure Laterality Date    HX HERNIA REPAIR        Family History   Problem Relation Age of Onset    Heart Disease Mother       History reviewed, no pertinent family history.   Social History     Tobacco Use    Smoking status: Former Smoker     Types: Cigarettes    Smokeless tobacco: Never Used   Substance Use Topics    Alcohol use: No     Allergies   Allergen Reactions    Aspirin Other (comments)     \"messes my stomach up\"      Current Facility-Administered Medications   Medication Dose Route Frequency    potassium chloride (K-DUR, KLOR-CON) SR tablet 40 mEq  40 mEq Oral BID    sodium chloride (OCEAN) 0.65 % nasal squeeze bottle 2 Spray  2 Spray Both Nostrils Q2H PRN    amLODIPine (NORVASC) tablet 5 mg  5 mg Oral DAILY    tamsulosin (FLOMAX) capsule 0.4 mg  0.4 mg Oral QPM    dilTIAZem IR (CARDIZEM) tablet 30 mg  30 mg Oral TIDAC    furosemide (LASIX) tablet 20 mg  20 mg Oral DAILY    methylPREDNISolone (PF) (Solu-MEDROL) injection 40 mg  40 mg IntraVENous Q8H    azithromycin (ZITHROMAX) tablet 500 mg  500 mg Oral Q24H    cefTRIAXone (ROCEPHIN) 1 g in sterile water (preservative free) 10 mL IV syringe  1 g IntraVENous Q24H    sodium chloride (NS) flush 5-40 mL  5-40 mL IntraVENous Q8H    sodium chloride (NS) flush 5-40 mL  5-40 mL IntraVENous PRN    acetaminophen (TYLENOL) tablet 650 mg  650 mg Oral Q6H PRN    Or    acetaminophen (TYLENOL) suppository 650 mg  650 mg Rectal Q6H PRN    bisacodyL (DULCOLAX) suppository 10 mg  10 mg Rectal DAILY PRN    promethazine (PHENERGAN) tablet 12.5 mg  12.5 mg Oral Q6H PRN    Or    ondansetron (ZOFRAN) injection 4 mg  4 mg IntraVENous Q6H PRN    famotidine (PEPCID) tablet 20 mg  20 mg Oral BID    enoxaparin (LOVENOX) injection 40 mg  40 mg SubCUTAneous DAILY    budesonide (PULMICORT) 500 mcg/2 ml nebulizer suspension  500 mcg Nebulization BID RT    arformoteroL (BROVANA) neb solution 15 mcg  15 mcg Nebulization BID RT    albuterol-ipratropium (DUO-NEB) 2.5 MG-0.5 MG/3 ML  3 mL Nebulization Q4H PRN          LAB AND IMAGING FINDINGS:     Lab Results   Component Value Date/Time    WBC 14.4 (H) 04/07/2021 12:55 AM    HGB 9.7 (L) 04/07/2021 12:55 AM    PLATELET 583 23/49/5235 12:55 AM     Lab Results   Component Value Date/Time    Sodium 130 (L) 04/07/2021 12:55 AM    Potassium 3.2 (L) 04/07/2021 12:55 AM    Chloride 91 (L) 04/07/2021 12:55 AM    CO2 28 04/07/2021 12:55 AM    BUN 39 (H) 04/07/2021 12:55 AM    Creatinine 1.56 (H) 04/07/2021 12:55 AM    Calcium 8.4 (L) 04/07/2021 12:55 AM    Magnesium 1.9 04/05/2021 01:30 PM    Phosphorus 3.1 03/03/2021 02:25 AM      Lab Results   Component Value Date/Time    Alk.  phosphatase 112 02/28/2021 04:45 AM    Protein, total 6.1 (L) 02/28/2021 04:45 AM    Albumin 3.5 04/05/2021 01:30 PM    Globulin 3.4 02/28/2021 04:45 AM     Lab Results   Component Value Date/Time    INR 0.9 07/09/2019 05:20 AM    Prothrombin time 12.3 07/09/2019 05:20 AM    aPTT 110.0 (H) 11/01/2019 04:40 AM      No results found for: IRON, FE, TIBC, IBCT, PSAT, FERR   No results found for: PH, PCO2, PO2  No components found for: [place]   Lab Results   Component Value Date/Time     04/05/2021 01:30 PM    CK - MB 4.6 (H) 04/05/2021 01:30 PM                Total time: 30 minutes  Counseling / coordination time, spent as noted above: 25 minutes  > 50% counseling / coordination?:  Yes, patient    Prolonged service was provided for  []30 min   []75 min in face to face time in the presence of the patient, spent as noted above. Time Start:   Time End:   Note: this can only be billed with 20362 (initial) or 10561 (follow up). If multiple start / stop times, list each separately.

## 2021-04-07 NOTE — PROGRESS NOTES
D/C Plan: SNF vs  and physician follow up     CM met with pt at bedside to discuss transition of care. CM discussed SNF at [facility]. Pt has indicated he does not need rehab, he needs surgery. Pt currently with an ortho consult pending. CM notified pt that we will follow up with his wife. CM contacted pt's wife, Ad Limon ([phone]), and notified her of the above. Pt's wife continues to state she is unable to care for this pt at this time and would prefer pt transition to [facility] when medically stable. CM notified pt's wife that we can not force him to go to SNF. Pt's wife has indicated she will speak with the pt again as he was agreeable when she spoke with him yesterday. Anticipate pt will transition ot [facility]. CM to continue to follow and await outcome of ortho consult. 1415:  CM has been notified [facility] is no longer able to assist with this pt. CM attempted to contact pt's wife and a vm was left requesting a return call. CM to continue to follow and assist.      6715:  CM received a return call from pt's wife. CM notified her that [facility] is not able to assist with this to. Pt's wife is agreeable to having clinical information being sent to area facilities and she will make a decision based on the accepting facilities.   CMS has been notified to assist.  CM to continue to follow and assist.    Care Management Interventions  Mode of Transport at Discharge: BLS  Transition of Care Consult (CM Consult): Discharge Planning, SNF  Health Maintenance Reviewed: Yes  Physical Therapy Consult: Yes  Occupational Therapy Consult: Yes  Current Support Network: Lives with Spouse  The Plan for Transition of Care is Related to the Following Treatment Goals [facility] vs SNF  The Patient and/or Patient Representative was Provided with a Choice of Provider and Agrees with the Discharge Plan?: Yes  Name of the Patient Representative Who was Provided with a Choice of Provider and Agrees with the Discharge Plan: Koby Egan  Freedom of Choice List was Provided with Basic Dialogue that Supports the Patient's Individualized Plan of Care/Goals, Treatment Preferences and Shares the Quality Data Associated with the Providers?: Yes  Discharge Location  Discharge Placement: Skilled nursing facility(vs )\

## 2021-04-07 NOTE — PROGRESS NOTES
Nephrology Inpatient Progress Note    Subjective:     Monika Silva is a 66 y.o.  male with a PMH of HTN, COPD on home O2, P Afib, hx Covid 2/26 then s/p J&J Covid vax 3 weeks ago now presenting with SOB. Pt c/o swelling, arthirtis- no N/V, diarrhea, CP, Denies fever. Na here 126 on 4/5/21. Back on 3/3/21, baseline Na was 139. Na up to 130 from 126. Cr unchanged at 1.5 today. UO 1.3 today so far. No cp/sob.     Admit Date: 4/5/2021  Patient Active Problem List   Diagnosis Code    Hypertension I10    COPD (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 [facility] J44.9    Chronic renal disease, stage 3, moderately decreased glomerular filtration rate between 30-59 mL/min/1.73 square meter ([facility]) N18.30    Asbestosis ([facility] [address] J61    Acute exacerbation of chronic obstructive pulmonary disease (COPD) ([facility] [address] J44.1    Debility R53.81    Paroxysmal atrial fibrillation ([facility]) I48.0    Chronic respiratory failure with hypoxia ([facility]) J96.11    Tobacco dependence F17.200    Hyponatremia E87.1    Pleural effusion, right J90    COPD with acute exacerbation ([facility] [facility] J44.1    Acute respiratory failure with hypoxia and hypercarbia ([facility]) J96.01, J96.02    Hyponatremia E87.1    Advanced care planning/counseling discussion Z71.89    Hypokalemia E87.6    COPD (chronic obstructive pulmonary disease) ([facility]) J44.9    CAP (community acquired pneumonia) J18.9    Respiratory distress R06.03    COPD exacerbation ([facility]) J44.1    Atrial fibrillation with RVR ([facility]) I48.91    Acute respiratory failure ([facility]) J96.00    Left hip pain M25.552    PAF (paroxysmal atrial fibrillation) ([facility]) I48.0     Current Facility-Administered Medications   Medication Dose Route Frequency    potassium chloride (K-DUR, KLOR-CON) SR tablet 40 mEq  40 mEq Oral BID    sodium chloride (OCEAN) 0.65 % nasal squeeze bottle 2 Spray  2 Spray Both Nostrils Q2H PRN    methylPREDNISolone (PF) (Solu-MEDROL) injection 40 mg  40 mg IntraVENous Q12H    [START ON 4/8/2021] famotidine (PEPCID) tablet 20 mg  20 mg Oral DAILY    ipratropium (ATROVENT) 0.02 % nebulizer solution 0.5 mg  0.5 mg Nebulization QID RT    amLODIPine (NORVASC) tablet 5 mg  5 mg Oral DAILY    tamsulosin (FLOMAX) capsule 0.4 mg  0.4 mg Oral QPM    dilTIAZem IR (CARDIZEM) tablet 30 mg  30 mg Oral TIDAC    furosemide (LASIX) tablet 20 mg  20 mg Oral DAILY    azithromycin (ZITHROMAX) tablet 500 mg  500 mg Oral Q24H    cefTRIAXone (ROCEPHIN) 1 g in sterile water (preservative free) 10 mL IV syringe  1 g IntraVENous Q24H    sodium chloride (NS) flush 5-40 mL  5-40 mL IntraVENous Q8H    sodium chloride (NS) flush 5-40 mL  5-40 mL IntraVENous PRN    acetaminophen (TYLENOL) tablet 650 mg  650 mg Oral Q6H PRN    Or    acetaminophen (TYLENOL) suppository 650 mg  650 mg Rectal Q6H PRN    bisacodyL (DULCOLAX) suppository 10 mg  10 mg Rectal DAILY PRN    promethazine (PHENERGAN) tablet 12.5 mg  12.5 mg Oral Q6H PRN    Or    ondansetron (ZOFRAN) injection 4 mg  4 mg IntraVENous Q6H PRN    enoxaparin (LOVENOX) injection 40 mg  40 mg SubCUTAneous DAILY    budesonide (PULMICORT) 500 mcg/2 ml nebulizer suspension  500 mcg Nebulization BID RT    arformoteroL (BROVANA) neb solution 15 mcg  15 mcg Nebulization BID RT    albuterol-ipratropium (DUO-NEB) 2.5 MG-0.5 MG/3 ML  3 mL Nebulization Q4H PRN       Allergy:   Allergies   Allergen Reactions    Aspirin Other (comments)     \"messes my stomach up\"        Objective:     Visit Vitals  /61   Pulse 89   Temp 97.7 °F (36.5 °C)   Resp 22   Ht 5' 7\" (1.702 m)   Wt 104.8 kg (231 lb)   SpO2 97%   BMI 36.18 kg/m²       Intake/Output Summary (Last 24 hours) at 4/7/2021 1524  Last data filed at 4/7/2021 1444  Gross per 24 hour   Intake 540 ml   Output 2775 ml   Net -2235 ml       Physical Exam:       General: No acute distress   HENT: Atraumatic and normocephalic   Eyes: Normal conjunctiva   Neck: Supple with no JVD/mass   Cardiovascular: Normal S1 & S2, no m/r/g   Pulmonary/Chest Wall: Clear to auscultation bilaterally   Abdominal: Soft and non-tender   Musculoskeletal: + edema    Neurological: No focal deficits       Data Review:  Recent Labs     04/07/21  0055 04/06/21  0505   * 126*   K 3.2* 4.0   CL 91* 89*   CO2 28 27   BUN 39* 31*   CREA 1.56* 1.48*   * 149*   CA 8.4* 8.7     Recent Labs     04/07/21  0055 04/06/21  0505   WBC 14.4* 11.4   HGB 9.7* 10.1*   HCT 28.0* 29.8*    393     Recent Labs     04/05/21  1330   ALB 3.5     No results for input(s): INR, PTP, APTT, INREXT, INREXT in the last 72 hours. No results for input(s): IRON, FE, TIBC, IBCT, PSAT, FERR in the last 72 hours. Most recent labs: reviewed. Impression:     Hyponatremia, hypervolemic, acute. Na up to 130 today. Baseline Na 139. Veronica 29. Has normal TSH. COPD, stable. Neg rapid COVID test  Hypokalemia, mild, K 3.2  CKD stage 3b, baseline 1.2 to 1.5 in recent months  HTN  Paroxysmal Afib  Arthiritis with left hip pain  Anemia    Plan:     Cardiazem for afib  Cont lasix 20mg po daily.     Replete K as indicated  Strict I/Os  AM labs with BMP  Avoid NSAIDs      Elodia Ramirez MD  [facility]  [phone]

## 2021-04-07 NOTE — PROGRESS NOTES
Palliative Medicine     CODE STATUS: PARTIAL CODE (DNR, okay for intubation with two week trial of ventilation, no feeding tubes) Has POST on file dated 8/7/2020 . Dr Jimmie Carpio updated on patient's wishes     AMD Status: Has completed AMD naming his wife Ruddy Mcallister as his primary surrogate decision maker and his daughter, Bonnie Rios, as his surrogate decision maker. Reviewed this document and he says his wishes remain the same                Date of Initial Consult: 4/6/2021  F/U visit: 4/7/2021  Reason for Consult: goals of care discussion; support  Requesting Provider: Dr Jimmie Carpio  Primary Care Physician: Dr Dacia Pop     F/U VISIT   (seen with Anmol Childress NP     PMH: (obtained from medical record and patient)   Prostate Ca s/p radiation; brain aneurysm; COPD; HTN; Afib     History relative to admission: Came to the ED 4/5/2021 from home with shortness of breath that has been worsening over the past 1-2 weeks and left knee and hip pain. He is on home O2. Has had multiple admissions recently for worsening COPD    4/7/2021 Seen today in room 341. Awake, alert. Oriented x 4 Respirations unlabored on O2 @ 2 lpm per NC. Pain persists in left hip. Tearful stating \"I have to have hip surgery. It's shot. \"  Acknowledges he is a high risk patient 2/2 pulmonary and cardiac co-morbidities but \"I have to dot it. I can't be an invalid\" States he is not afraid of dying but is fearful of leaving his family alone. Reinforce support for his decision. Disposition plan: Pending outcome of hip surgery.     Palliative Medicine remains available for any questions or concerns    Carlos Pagan RN, MSN  P: [phone]

## 2021-04-07 NOTE — CONSULTS
[facility] Consult Note      Patient: Hollis Lazo MRN: [id number]  SSN: [id number]    YOB: 1945  Age: 66 y.o. Sex: male        Date of Consultation: 4/7/2021  Referring Physician:   Reason for Consultation: Preoperative cardiac clearance for hip surgery    HPI:  I was asked by Dr. Deborah Celis for preoperative cardiac clearance. Hollis Lazo is a 75-year-old gentleman who is being scheduled for hip surgery and Cardiology was called in for preoperative clearance. Patient physical capacity is limited unable to give assessment of functional capacity. Patient have multiple risk factors for CAD but denied any history of chest pain. Patient have history of chronic shortness of breath and lower extremity edema, shortness of breath is from significant COPD and lower extremity edema is probably due to prolong sitting non ambulatory status.     Past Medical History:   Diagnosis Date    Brain aneurysm     Cancer [facility])     prostate    COPD (chronic obstructive pulmonary disease) [place] [address]     Hypertension     Nocturia     Pneumonia     Radiation effect      Past Surgical History:   Procedure Laterality Date    HX HERNIA REPAIR       Current Facility-Administered Medications   Medication Dose Route Frequency    potassium chloride (K-DUR, KLOR-CON) SR tablet 40 mEq  40 mEq Oral BID    sodium chloride (OCEAN) 0.65 % nasal squeeze bottle 2 Spray  2 Spray Both Nostrils Q2H PRN    methylPREDNISolone (PF) (Solu-MEDROL) injection 40 mg  40 mg IntraVENous Q12H    [START ON 4/8/2021] famotidine (PEPCID) tablet 20 mg  20 mg Oral DAILY    ipratropium (ATROVENT) 0.02 % nebulizer solution 0.5 mg  0.5 mg Nebulization QID RT    amLODIPine (NORVASC) tablet 5 mg  5 mg Oral DAILY    tamsulosin (FLOMAX) capsule 0.4 mg  0.4 mg Oral QPM    dilTIAZem IR (CARDIZEM) tablet 30 mg  30 mg Oral TIDAC    furosemide (LASIX) tablet 20 mg  20 mg Oral DAILY    azithromycin (ZITHROMAX) tablet 500 mg  500 mg Oral Q24H    cefTRIAXone (ROCEPHIN) 1 g in sterile water (preservative free) 10 mL IV syringe  1 g IntraVENous Q24H    sodium chloride (NS) flush 5-40 mL  5-40 mL IntraVENous Q8H    sodium chloride (NS) flush 5-40 mL  5-40 mL IntraVENous PRN    acetaminophen (TYLENOL) tablet 650 mg  650 mg Oral Q6H PRN    Or    acetaminophen (TYLENOL) suppository 650 mg  650 mg Rectal Q6H PRN    bisacodyL (DULCOLAX) suppository 10 mg  10 mg Rectal DAILY PRN    promethazine (PHENERGAN) tablet 12.5 mg  12.5 mg Oral Q6H PRN    Or    ondansetron (ZOFRAN) injection 4 mg  4 mg IntraVENous Q6H PRN    enoxaparin (LOVENOX) injection 40 mg  40 mg SubCUTAneous DAILY    budesonide (PULMICORT) 500 mcg/2 ml nebulizer suspension  500 mcg Nebulization BID RT    arformoteroL (BROVANA) neb solution 15 mcg  15 mcg Nebulization BID RT    albuterol-ipratropium (DUO-NEB) 2.5 MG-0.5 MG/3 ML  3 mL Nebulization Q4H PRN       Allergies and Intolerances: Allergies   Allergen Reactions    Aspirin Other (comments)     \"messes my stomach up\"       Family History:   Family History   Problem Relation Age of Onset    Heart Disease Mother        Social History:   He  reports that he has quit smoking. His smoking use included cigarettes. He has never used smokeless tobacco.  He  reports no history of alcohol use. Review of Systems:     Review of Systems  Gen: No fever, chills, malaise, weight loss/gain. Heent: No headache, rhinorrhea, epistaxis, ear pain, hearing loss, sinus pain, neck pain/stiffness, sore throat. Heart: No chest pain, palpitations, KUMAR, pnd, or orthopnea. Resp: No cough, hemoptysis, wheezing and +shortness of breath. GI: No nausea, vomiting, diarrhea, constipation, melena or hematochezia. : No urinary obstruction, dysuria or hematuria. Derm: No rash, new skin lesion or pruritis. Musc/skeletal: no bone or joint complains. Vasc: ++ edema, cyanosis or claudication. Endo: No heat/cold intolerance, no polyuria,polydipsia or polyphagia. Neuro: No unilateral weakness, numbness, tingling. No seizures. Heme: No easy bruising or bleeding. Physical:   Patient Vitals for the past 6 hrs:   Temp Pulse Resp BP SpO2   04/07/21 1454 97.7 °F (36.5 °C) 89 22 127/61 97 %   04/07/21 1255 -- -- -- -- 98 %         Exam:   General Appearance: Comfortable, not using accessory muscles of respiration. HEENT: TAMI. HEAD: Atraumatic  NECK: No JVD, no thyroidomeglay. LUNGS:   Bilateral prolonged expiration   HEART: S1+S2     ABD: Non-tender, BS Audible    EXT: ++ edema, and no cysnosis. VASCULAR EXAM: Pulses are intact. PSYCHIATRIC EXAM: Mood is appropriate. MUSCULOSKELETAL: Grossly no joint deformity. NEUROLOGICAL: Motor and sensory sytem intact and Cranial nerves II-XII intact. Review of Data:   LABS:   Lab Results   Component Value Date/Time    WBC 14.4 (H) 04/07/2021 12:55 AM    HGB 9.7 (L) 04/07/2021 12:55 AM    HCT 28.0 (L) 04/07/2021 12:55 AM    PLATELET 061 84/31/4866 12:55 AM     Lab Results   Component Value Date/Time    Sodium 130 (L) 04/07/2021 12:55 AM    Potassium 3.2 (L) 04/07/2021 12:55 AM    Chloride 91 (L) 04/07/2021 12:55 AM    CO2 28 04/07/2021 12:55 AM    Glucose 163 (H) 04/07/2021 12:55 AM    BUN 39 (H) 04/07/2021 12:55 AM    Creatinine 1.56 (H) 04/07/2021 12:55 AM     No results found for: CHOL, CHOLX, CHLST, CHOLV, HDL, HDLP, LDL, LDLC, DLDLP, TGLX, TRIGL, TRIGP  No components found for: GPT  Lab Results   Component Value Date/Time    Hemoglobin A1c 5.8 (H) 02/27/2021 01:44 AM       RADIOLOGY:  CT Results  (Last 48 hours)               04/06/21 1247  CT HIP LT WO CONT Final result    Impression:  ---------------------------------------------------------------------------       1. Subchondral serpiginous lucencies in the weightbearing left femoral head   suspicious for avascular necrosis, with associated cortical irregularity in   keeping with flattening/collapse. Suspected left hip joint effusion.        2. Relatively severe mild degenerative changes in the left hip. Mild   degenerative changes in the right hip. Degenerative changes in the SI joints and   spine. In the setting of radiculopathy, routine lumbar spine MRI could best   further assess. Narrative:  ---------------------------------------------------------------------------   <<<<<<<<<           [facility]           >>>>>>>>>    ---------------------------------------------------------------------------       HISTORY:    -From Provider:r/o avn, not candidate for mri   -Additional: None       COMPARISON EXAMINATIONS:   None. TECHNIQUE:   CT of the left hip without intravenous contrast administration. Coronal and sagittal reformats were generated and reviewed. One or more dose reduction techniques were used on this CT: automated exposure   control, adjustment of the mAs and/or kVp according to patient size, and   iterative reconstruction techniques. The specific techniques used on this CT   exam have been documented in the patient's electronic medical record.           --------------------------     FINDINGS    --------------------------       OSSEOUS STRUCTURES:     Left hip: There is irregular subchondral lucency and cortical irregularity with   slight flattening/collapse of the left femoral head. Severe degenerative changes   in the right hip with severe joint space narrowing, subchondral cysts and slight   marginal spurring. Soft tissue thickening about the joint space suggesting hip   joint effusion. Visualized bony pelvis and proximal femur: Degenerative changes in the SI joints   and spine. Relatively severe central and foraminal stenosis at L4/5 and to   lesser degree at L3/4. Visualized pelvic soft tissues:   LYMPH NODES:  Subcentimeter short axis left groin lymph nodes are present. [ ]   GI TRACT:  Colonic diverticulosis, without CT evidence diverticulitis. Feculent   distention of the rectum. PELVIC ORGANS:  The visualized portion of the bladder is unremarkable. VASCULATURE:  Atherosclerotic calcification in visualized left iliac and femoral   arteries. OTHER:   No ascites or free intraperitoneal air in visualized extent. Fat-containing left inguinal hernia. Fatty change in the gluteal musculature   bilaterally.  Subcutaneous stranding/edema along left more than right lateral   flank.           ---------------------------------------------------------------------------           CXR Results  (Last 48 hours)    None            Cardiology Procedures:   Results for orders placed or performed during the hospital encounter of 04/05/21   EKG, 12 LEAD, INITIAL   Result Value Ref Range    Ventricular Rate 89 BPM    Atrial Rate 89 BPM    P-R Interval 170 ms    QRS Duration 90 ms    Q-T Interval 386 ms    QTC Calculation (Bezet) 469 ms    Calculated P Axis 42 degrees    Calculated T Axis 30 degrees    Diagnosis       Poor data quality, interpretation may be adversely affected  Normal sinus rhythm  Poor R Wave Progression  Abnormal ECG  Confirmed by Ousmane Robles MD, Cinda Landry ([id number]) on 4/6/2021 1:08:42 AM        Echo Results  (Last 48 hours)    None       Cardiolite (Tc-99m Sestamibi) stress test        Impression / Plan:    Patient Active Problem List   Diagnosis Code    Hypertension I10    COPD (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 ([facility]) J44.9    Chronic renal disease, stage 3, moderately decreased glomerular filtration rate between 30-59 mL/min/1.73 square meter ([facility]) N18.30    Asbestosis ([address] J61    Acute exacerbation of chronic obstructive pulmonary disease (COPD) ([facility]) J44.1    Debility R53.81    Paroxysmal atrial fibrillation ([facility]) I48.0    Chronic respiratory failure with hypoxia ([facility]) J96.11    Tobacco dependence F17.200    Hyponatremia E87.1    Pleural effusion, right J90    COPD with acute exacerbation ([facility]) J44.1    Acute respiratory failure with hypoxia and hypercarbia ([facility]) J96.01, J96.02    Hyponatremia E87.1    Advanced care planning/counseling discussion Z71.89    Hypokalemia E87.6    COPD (chronic obstructive pulmonary disease) ([facility]) J44.9    CAP (community acquired pneumonia) J18.9    Respiratory distress R06.03    COPD exacerbation ([facility]) J44.1    Atrial fibrillation with RVR ([facility]) I48.91    Acute respiratory failure ([facility]) J96.00    Left hip pain M25.552    PAF (paroxysmal atrial fibrillation) ([facility]) I48.0        ASSESSMENT AND PLAN     preoperative cardiac clearance for hip surgery  Chronic Symptomatic COPD  Paroxysmal atrial fibrillation    Plan. At this point patient functional capacity is limited due to multiple comorbidities and have not have any ischemic coronary/cardiac testing done. Due to significant COPD I will schedule a dobutamine stress echocardiogram.  I will also assess PA pressure from bilateral lower extremity edema standpoint. Patient may need IV diuretic for bilateral lower extremity edema. Consider therapeutic anticoagulation after hip surgery from paroxysmal atrial fibrillation standpoint if there is no contraindication. management plan was discussed with patient.           Signed By: Lorne Agrawal MD     April 7, 2021

## 2021-04-08 ENCOUNTER — APPOINTMENT (OUTPATIENT)
Dept: NON INVASIVE DIAGNOSTICS | Age: 76
DRG: 469 | End: 2021-04-08
Attending: INTERNAL MEDICINE
Payer: MEDICARE

## 2021-04-08 PROBLEM — M87.00 AVASCULAR NECROSIS (HCC): Status: RESOLVED | Noted: 2021-04-08 | Resolved: 2021-04-08

## 2021-04-08 PROBLEM — M87.00 AVASCULAR NECROSIS (HCC): Status: ACTIVE | Noted: 2021-04-08

## 2021-04-08 PROBLEM — M87.052 AVASCULAR NECROSIS OF BONE OF LEFT HIP (HCC): Status: ACTIVE | Noted: 2021-04-08

## 2021-04-08 LAB
ALBUMIN SERPL-MCNC: 2.9 G/DL (ref 3.4–5)
ANION GAP SERPL CALC-SCNC: 8 MMOL/L (ref 3–18)
BASOPHILS # BLD: 0 K/UL (ref 0–0.1)
BASOPHILS NFR BLD: 0 % (ref 0–2)
BUN SERPL-MCNC: 41 MG/DL (ref 7–18)
BUN/CREAT SERPL: 32 (ref 12–20)
CALCIUM SERPL-MCNC: 7.9 MG/DL (ref 8.5–10.1)
CHLORIDE SERPL-SCNC: 93 MMOL/L (ref 100–111)
CO2 SERPL-SCNC: 30 MMOL/L (ref 21–32)
CREAT SERPL-MCNC: 1.27 MG/DL (ref 0.6–1.3)
DIFFERENTIAL METHOD BLD: ABNORMAL
ECHO AO ROOT DIAM: 2.87 CM
ECHO AV MEAN GRADIENT: 6.83 MMHG
ECHO AV PEAK GRADIENT: 10.65 MMHG
ECHO AV PEAK VELOCITY: 163.15 CM/S
ECHO AV VTI: 36.28 CM
ECHO IVC PROX: 1.96 CM
ECHO LA MAJOR AXIS: 4.42 CM
ECHO LA MINOR AXIS: 2.06 CM
ECHO LV E' LATERAL VELOCITY: 10 CM/S
ECHO LV E' SEPTAL VELOCITY: 9 CM/S
ECHO LV EJECTION FRACTION BIPLANE: 50 % (ref 55–100)
ECHO LV INTERNAL DIMENSION DIASTOLIC: 5.86 CM (ref 4.2–5.9)
ECHO LV INTERNAL DIMENSION SYSTOLIC: 4.36 CM
ECHO LV IVSD: 1.51 CM (ref 0.6–1)
ECHO LV MASS 2D: 349.6 G (ref 88–224)
ECHO LV MASS INDEX 2D: 162.6 G/M2 (ref 49–115)
ECHO LV POSTERIOR WALL DIASTOLIC: 1.16 CM (ref 0.6–1)
ECHO MV A VELOCITY: 100.14 CM/S
ECHO MV AREA PHT: 5.71 CM2
ECHO MV E DECELERATION TIME (DT): 132.93 MS
ECHO MV E VELOCITY: 81.25 CM/S
ECHO MV E/A RATIO: 0.81
ECHO MV E/E' LATERAL: 8.13
ECHO MV E/E' RATIO (AVERAGED): 8.58
ECHO MV E/E' SEPTAL: 9.03
ECHO MV PRESSURE HALF TIME (PHT): 38.55 MS
EOSINOPHIL # BLD: 0 K/UL (ref 0–0.4)
EOSINOPHIL NFR BLD: 0 % (ref 0–5)
ERYTHROCYTE [DISTWIDTH] IN BLOOD BY AUTOMATED COUNT: 13.3 % (ref 11.6–14.5)
GLUCOSE SERPL-MCNC: 145 MG/DL (ref 74–99)
HCT VFR BLD AUTO: 27.9 % (ref 36–48)
HGB BLD-MCNC: 9.7 G/DL (ref 13–16)
LYMPHOCYTES # BLD: 0.5 K/UL (ref 0.9–3.6)
LYMPHOCYTES NFR BLD: 4 % (ref 21–52)
MCH RBC QN AUTO: 29.6 PG (ref 24–34)
MCHC RBC AUTO-ENTMCNC: 34.8 G/DL (ref 31–37)
MCV RBC AUTO: 85.1 FL (ref 74–97)
MONOCYTES # BLD: 0.4 K/UL (ref 0.05–1.2)
MONOCYTES NFR BLD: 4 % (ref 3–10)
NEUTS SEG # BLD: 10.9 K/UL (ref 1.8–8)
NEUTS SEG NFR BLD: 91 % (ref 40–73)
OSMOLALITY UR: 311 MOSMOL/KG
PHOSPHATE SERPL-MCNC: 3.3 MG/DL (ref 2.5–4.9)
PLATELET # BLD AUTO: 333 K/UL (ref 135–420)
PMV BLD AUTO: 8.4 FL (ref 9.2–11.8)
POTASSIUM SERPL-SCNC: 3.5 MMOL/L (ref 3.5–5.5)
RBC # BLD AUTO: 3.28 M/UL (ref 4.35–5.65)
SODIUM SERPL-SCNC: 131 MMOL/L (ref 136–145)
STRESS BASELINE HR: 81 BPM
STRESS ESTIMATED WORKLOAD: 1 METS
STRESS EXERCISE DUR MIN: ABNORMAL
STRESS PEAK DIAS BP: 80 MMHG
STRESS PEAK SYS BP: 160 MMHG
STRESS PERCENT HR ACHIEVED: 96 %
STRESS POST PEAK HR: 137 BPM
STRESS RATE PRESSURE PRODUCT: ABNORMAL BPM*MMHG
STRESS ST DEPRESSION: 0 MM
STRESS ST ELEVATION: 0 MM
STRESS TARGET HR: 143 BPM
WBC # BLD AUTO: 12.1 K/UL (ref 4.6–13.2)

## 2021-04-08 PROCEDURE — 74011000250 HC RX REV CODE- 250: Performed by: INTERNAL MEDICINE

## 2021-04-08 PROCEDURE — 74011250636 HC RX REV CODE- 250/636: Performed by: INTERNAL MEDICINE

## 2021-04-08 PROCEDURE — 74011250637 HC RX REV CODE- 250/637: Performed by: INTERNAL MEDICINE

## 2021-04-08 PROCEDURE — 74011250636 HC RX REV CODE- 250/636: Performed by: HOSPITALIST

## 2021-04-08 PROCEDURE — 94640 AIRWAY INHALATION TREATMENT: CPT

## 2021-04-08 PROCEDURE — 77010033678 HC OXYGEN DAILY

## 2021-04-08 PROCEDURE — 99231 SBSQ HOSP IP/OBS SF/LOW 25: CPT | Performed by: NURSE PRACTITIONER

## 2021-04-08 PROCEDURE — 80069 RENAL FUNCTION PANEL: CPT

## 2021-04-08 PROCEDURE — C8930 TTE W OR W/O CONTR, CONT ECG: HCPCS

## 2021-04-08 PROCEDURE — 74011000250 HC RX REV CODE- 250: Performed by: HOSPITALIST

## 2021-04-08 PROCEDURE — 74011250637 HC RX REV CODE- 250/637: Performed by: HOSPITALIST

## 2021-04-08 PROCEDURE — 65660000000 HC RM CCU STEPDOWN

## 2021-04-08 PROCEDURE — 36415 COLL VENOUS BLD VENIPUNCTURE: CPT

## 2021-04-08 PROCEDURE — 85025 COMPLETE CBC W/AUTO DIFF WBC: CPT

## 2021-04-08 RX ORDER — DOBUTAMINE HYDROCHLORIDE 200 MG/100ML
0-10 INJECTION INTRAVENOUS
Status: DISCONTINUED | OUTPATIENT
Start: 2021-04-08 | End: 2021-04-08

## 2021-04-08 RX ADMIN — Medication 10 ML: at 21:22

## 2021-04-08 RX ADMIN — FAMOTIDINE 20 MG: 20 TABLET, FILM COATED ORAL at 08:25

## 2021-04-08 RX ADMIN — BUDESONIDE 500 MCG: 0.5 INHALANT RESPIRATORY (INHALATION) at 07:11

## 2021-04-08 RX ADMIN — BUDESONIDE 500 MCG: 0.5 INHALANT RESPIRATORY (INHALATION) at 18:03

## 2021-04-08 RX ADMIN — METHYLPREDNISOLONE SODIUM SUCCINATE 30 MG: 125 INJECTION, POWDER, FOR SOLUTION INTRAMUSCULAR; INTRAVENOUS at 21:22

## 2021-04-08 RX ADMIN — DILTIAZEM HYDROCHLORIDE 30 MG: 30 TABLET, FILM COATED ORAL at 11:58

## 2021-04-08 RX ADMIN — ENOXAPARIN SODIUM 40 MG: 40 INJECTION SUBCUTANEOUS at 08:25

## 2021-04-08 RX ADMIN — IPRATROPIUM BROMIDE AND ALBUTEROL SULFATE 3 ML: .5; 3 SOLUTION RESPIRATORY (INHALATION) at 00:04

## 2021-04-08 RX ADMIN — DILTIAZEM HYDROCHLORIDE 30 MG: 30 TABLET, FILM COATED ORAL at 17:14

## 2021-04-08 RX ADMIN — METHYLPREDNISOLONE SODIUM SUCCINATE 40 MG: 125 INJECTION, POWDER, FOR SOLUTION INTRAMUSCULAR; INTRAVENOUS at 08:25

## 2021-04-08 RX ADMIN — IPRATROPIUM BROMIDE 0.5 MG: 0.5 SOLUTION RESPIRATORY (INHALATION) at 07:11

## 2021-04-08 RX ADMIN — Medication 10 ML: at 17:14

## 2021-04-08 RX ADMIN — CEFTRIAXONE 1 G: 1 INJECTION, POWDER, FOR SOLUTION INTRAMUSCULAR; INTRAVENOUS at 17:14

## 2021-04-08 RX ADMIN — DILTIAZEM HYDROCHLORIDE 30 MG: 30 TABLET, FILM COATED ORAL at 08:25

## 2021-04-08 RX ADMIN — AZITHROMYCIN MONOHYDRATE 500 MG: 250 TABLET ORAL at 17:14

## 2021-04-08 RX ADMIN — ARFORMOTEROL TARTRATE 15 MCG: 15 SOLUTION RESPIRATORY (INHALATION) at 18:02

## 2021-04-08 RX ADMIN — DOBUTAMINE HYDROCHLORIDE 10 MCG/KG/MIN: 200 INJECTION INTRAVENOUS at 11:14

## 2021-04-08 RX ADMIN — ARFORMOTEROL TARTRATE 15 MCG: 15 SOLUTION RESPIRATORY (INHALATION) at 07:12

## 2021-04-08 RX ADMIN — TAMSULOSIN HYDROCHLORIDE 0.4 MG: 0.4 CAPSULE ORAL at 17:14

## 2021-04-08 RX ADMIN — SODIUM CHLORIDE 1 ML: 9 INJECTION INTRAMUSCULAR; INTRAVENOUS; SUBCUTANEOUS at 11:15

## 2021-04-08 RX ADMIN — IPRATROPIUM BROMIDE 0.5 MG: 0.5 SOLUTION RESPIRATORY (INHALATION) at 15:09

## 2021-04-08 RX ADMIN — Medication 10 ML: at 06:14

## 2021-04-08 RX ADMIN — IPRATROPIUM BROMIDE 0.5 MG: 0.5 SOLUTION RESPIRATORY (INHALATION) at 18:04

## 2021-04-08 RX ADMIN — DOBUTAMINE HYDROCHLORIDE 20 MCG/KG/MIN: 200 INJECTION INTRAVENOUS at 11:16

## 2021-04-08 RX ADMIN — FUROSEMIDE 20 MG: 20 TABLET ORAL at 08:25

## 2021-04-08 RX ADMIN — AMLODIPINE BESYLATE 5 MG: 5 TABLET ORAL at 08:25

## 2021-04-08 NOTE — PROGRESS NOTES
Pulmonary and Sleep Medicine     Pulmonary Note    Patient: Sergio Handley               Sex: male          DOA: 4/5/2021       YOB: 1945      Age:  66 y.o.        LOS:  LOS: 3 days        Reason for Consult: COPD exacerbation, pre-op pulmonary clearance    IMPRESSION:   Patient Active Problem List   Diagnosis Code    Hypertension I10    COPD (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 ([facility]) J44.9    Chronic renal disease, stage 3, moderately decreased glomerular filtration rate between 30-59 mL/min/1.73 square meter ([facility]) N18.30    Asbestosis ([facility] J61    Acute exacerbation of chronic obstructive pulmonary disease (COPD) ([address] J44.1    Debility R53.81    Paroxysmal atrial fibrillation ([facility]) I48.0    Chronic respiratory failure with hypoxia ([facility]) J96.11    Tobacco dependence F17.200    Hyponatremia E87.1    Pleural effusion, right J90    COPD with acute exacerbation ([facility] J44.1    Acute respiratory failure with hypoxia and hypercarbia ([facility]) J96.01, J96.02    Hyponatremia E87.1    Advanced care planning/counseling discussion Z71.89    Hypokalemia E87.6    COPD (chronic obstructive pulmonary disease) ([facility]) J44.9    CAP (community acquired pneumonia) J18.9    Respiratory distress R06.03    COPD exacerbation ([facility]) J44.1    Atrial fibrillation with RVR ([facility]) I48.91    Acute respiratory failure ([facility]) J96.00    Left hip pain M25.552    PAF (paroxysmal atrial fibrillation) ([facility]) I48.0 ·   Pre-op pulmonary exam- Z01.811   RECOMMENDATIONS:   Supplemental O2 via NC @ 2 Lpm, titrate flow for goal SPO2> 91%  Reports feeling better since y'day with able to stand and walk in rm with less difficulty  Taper steroid to 30 mg Q12  Continue Atrovent QID and continue budesonide, Brovana nebs  Pulmonary hygiene care, IS when awake  Antibiotic choice: Rocephin, Azithromycin    XR chest 4/5/21- nil acute; no pneumonia  ABG from 4/5/21 reviewed with chronic hypoxia  Pt previously has been on ventilator an last episode 02/21 - extubated after 3 day on ventilator. Previously used BiPAP for COPD exacerbation  He takes Stiolto and Pulmicort neb for COPD at home  He is on O2 at 2 Lpm at home  Former smoker, quit about 1.5 yrs ago  PFT 6/29/20 in our office with his regular pulmonologist showed - FEV1 54% [GOLD CLASS 2], FEV1% 58, %, %, DLCO 67% - moderate COPD with hyperinflation and reduced DLCO    COPD exacerbation appears to be mild and clinically improving  Patient is moderately high risk for post-op pulmonary complication   He may need urgent surgery as patients hip pain limiting his active lifestyle which may adversely affect his COPD as well  His risk cannot be further diminished. Be alert for perioperative complications such as aspiration or nosocomial pneumonia, respiratory failure, prolonged mechanical ventilation, atelectasis, wound healing on steroids. Surgical decision should be based on acceptance of such risks and potential benefits of surgery between orthopedics and the patient. \"I want to take my chances. I can't live my life in wheelchair. \"  He would need rachid-operative O2 supplement, bronchodilators, DVT prophylaxis per surgery, judicious luqelgey-lawhwjvtia-kfaixl mediation/s, aggressive pulmonary toilet, airways monitoring with aspiration precautions and HOB30'. Early mobilization, regular use of incentive spirometry should be goals- patient agrees. If option of regional anesthesia available over [place] then should be considered  If unable to be extubated post-op, then monitored in ICU post-op and planned extubation could be done. Our pulmonary group would be happy to assist as needed post operatively.      Aspiration prevention bundle, head of the bed at 30' all times  Glycemic control as needed while on steroids  Stress ulcer and DVT prophylaxis    Await cardiology work up  Pt is possibly going to have regional anesthesia for surgery   I spoke with wife Erinn Lat at her listed number [[phone]] on chart 4/7/21 and discussed above. She feels pt in distress from hip pain affecting his quality of life and would support 's decision  PT and OT  Palliative care consult following. Partial DNR noted. Will defer respective systems problem management to primary and other consultant and follow patient with primary and other team  Further recommendations will be based on the patient's response to recommended treatment and results of the investigation ordered. HPI:   Mr. Rene Jacobson is a 66 y.o. male who is seen at the request of Dr. Debbi Neff for COPD exacerbation, pre-op respiratory exam. He is know to my associate Dr. Jono Osborn and follows with him for COPD. He presented with PMHx of PAF, COPD, asbestos pleural plaque, chronic respiratory failure with hypoxia 2 L O2 at home came to ER due to hip pain and SOB worsening in 1-2 weeks PTA, associated with cough, wheezing. He received iv steroid and breathing tx in ER, while x-ray of chest no acute process. ABG on room air showed hypoxia. He has remained on steroid and bronchodilator therapy. Reports feeling improving close to baseline. During this hospitalization w/up for LT hip pain showed avascular necrosis and orthopedics surgery is consulted for their opinion about THR. The patient denies fever, chills, chest pain, hemoptysis, sore throat, sepsis recently. He feels cough is now intermittent, improved wheezing and improving dyspnea. 04/08/21   Pt seen at bedside rm # [phone]  Sitting and bed edge; able to stand up easily  Reports ambulating in rm with less dyspnea  Remained on 2 Lpm O2 via NC  The patient denies fever, chills, cough, chest pain, wheezing or hemoptysis  Waiting cardiology work up  I was not contacted by staff on anything about patient overnight.        Review of Systems   General ROS: negative for - fever, chills, weight loss, fatigue and malaise  Cardiovascular ROS: negative for - chest pain, edema, murmur, orthopnea, palpitations or pnd  Gastrointestinal ROS: no nausea, vomiting, abdominal pain, change in bowel habits, or black or bloody stools  Genito-Urinary ROS: no dysuria, trouble voiding, or hematuria  Dermatological ROS: negative for - pruritus, rash or skin lesion changes       Past Medical History  Past Medical History:   Diagnosis Date    Brain aneurysm     Cancer ([address]     prostate    [facility]     Hypertension     Nocturia     Pneumonia     Radiation effect        Past Surgical History  Past Surgical History:   Procedure Laterality Date    HX HERNIA REPAIR         Family History  Family History   Problem Relation Age of Onset    Heart Disease Mother        Social History  Social History     Tobacco Use    Smoking status: Former Smoker     Types: Cigarettes    Smokeless tobacco: Never Used   Substance Use Topics    Alcohol use: No    Drug use: Never        Medications  Current Facility-Administered Medications   Medication Dose Route Frequency    perflutren lipid microspheres (DEFINITY) in NS bolus IV  1 mL IntraVENous RAD ONCE    methylPREDNISolone (PF) (Solu-MEDROL) injection 30 mg  30 mg IntraVENous Q12H    sodium chloride (OCEAN) 0.65 % nasal squeeze bottle 2 Spray  2 Spray Both Nostrils Q2H PRN    famotidine (PEPCID) tablet 20 mg  20 mg Oral DAILY    ipratropium (ATROVENT) 0.02 % nebulizer solution 0.5 mg  0.5 mg Nebulization QID RT    amLODIPine (NORVASC) tablet 5 mg  5 mg Oral DAILY    tamsulosin (FLOMAX) capsule 0.4 mg  0.4 mg Oral QPM    dilTIAZem IR (CARDIZEM) tablet 30 mg  30 mg Oral TIDAC    furosemide (LASIX) tablet 20 mg  20 mg Oral DAILY    azithromycin (ZITHROMAX) tablet 500 mg  500 mg Oral Q24H    cefTRIAXone (ROCEPHIN) 1 g in sterile water (preservative free) 10 mL IV syringe  1 g IntraVENous Q24H    sodium chloride (NS) flush 5-40 mL  5-40 mL IntraVENous Q8H    sodium chloride (NS) flush 5-40 mL 5-40 mL IntraVENous PRN    acetaminophen (TYLENOL) tablet 650 mg  650 mg Oral Q6H PRN    Or    acetaminophen (TYLENOL) suppository 650 mg  650 mg Rectal Q6H PRN    bisacodyL (DULCOLAX) suppository 10 mg  10 mg Rectal DAILY PRN    promethazine (PHENERGAN) tablet 12.5 mg  12.5 mg Oral Q6H PRN    Or    ondansetron (ZOFRAN) injection 4 mg  4 mg IntraVENous Q6H PRN    enoxaparin (LOVENOX) injection 40 mg  40 mg SubCUTAneous DAILY    budesonide (PULMICORT) 500 mcg/2 ml nebulizer suspension  500 mcg Nebulization BID RT    arformoteroL (BROVANA) neb solution 15 mcg  15 mcg Nebulization BID RT    albuterol-ipratropium (DUO-NEB) 2.5 MG-0.5 MG/3 ML  3 mL Nebulization Q4H PRN     Prior to Admission medications    Medication Sig Start Date End Date Taking? Authorizing Provider   predniSONE (STERAPRED DS) 10 mg dose pack Take 6 tablets p.o. daily x1 day, take 5 tablets p.o. daily x1 day, take 4 tablets p.o. daily x1 day, take 3 tabs p.o. daily x1 day, take 2 tablets p.o. daily x1 day, and take 1 tablet p.o. daily x1 day. 3/2/21   Elham Zapata MD   amLODIPine (NORVASC) 5 mg tablet Take 1 Tab by mouth daily. 3/3/21   Elham Zapata MD   albuterol-ipratropium (DUO-NEB) 2.5 mg-0.5 mg/3 ml nebu 3 mL by Nebulization route every four (4) hours as needed for Wheezing. 2/7/21   Gardenia Jordan MD   albuterol (PROVENTIL HFA, VENTOLIN HFA, PROAIR HFA) 90 mcg/actuation inhaler Take 2 Puffs by inhalation every four (4) hours as needed for Wheezing or Shortness of Breath. 2/7/21   Gardenia Jordan MD   OXYGEN-AIR DELIVERY SYSTEMS 2 L/min by Nasal route continuous. Provider, Historical   furosemide (Lasix) 20 mg tablet Take 20 mg by mouth daily. Provider, Historical   dilTIAZem (CARDIZEM) 30 mg tablet Take 1 Tab by mouth Before breakfast, lunch, and dinner. Patient taking differently: Take 30 mg by mouth daily.  Daily 4/14/20   Jo Monroy MD   acetaminophen (TYLENOL) 325 mg tablet Take 650 mg by mouth every eight (8) hours as needed for Pain. Provider, Historical   dextran 70/hypromellose (ARTIFICIAL TEARS, PF, OP) Apply 2 Drops to eye as needed for Other (both eyes for dryness). Provider, Historical   diphenhydrAMINE (BENADRYL) 25 mg capsule Take 25 mg by mouth nightly as needed for Itching. Provider, Historical   tamsulosin (FLOMAX) 0.4 mg capsule Take 0.4 mg by mouth every evening. Other, MD Blayne       Allergy  Allergies   Allergen Reactions    Aspirin Other (comments)     \"messes my stomach up\"       Physical Exam:   Vital Signs:    Blood pressure (!) 148/73, pulse 95, temperature 97.8 °F (36.6 °C), resp. rate 22, height 5' 7\" (1.702 m), weight 104.8 kg (231 lb), SpO2 98 %. Body mass index is 36.18 kg/m². O2 Device: Nasal cannula   O2 Flow Rate (L/min): 2 l/min   Temp (24hrs), Av.6 °F (36.4 °C), Min:97.3 °F (36.3 °C), Max:97.8 °F (36.6 °C)       Intake/Output:   Last shift:      No intake/output data recorded.   Last 3 shifts:  1901 -  0700  In: 1480 [P.O.:1480]  Out: 3400 [Urine:3400]    Intake/Output Summary (Last 24 hours) at 2021 1410  Last data filed at 2021 0433  Gross per 24 hour   Intake 940 ml   Output 1625 ml   Net -685 ml        Physical Exam:   General: AAO x 3, in no respiratory distress, cooperative, on O2 via NC  HEENT: PERRLA, throat normal without erythema or exudate  Neck: No abnormally enlarged lymph nodes or thyroid, supple  Chest: increased AP diameter  Lungs: decreased air exchange bilaterally, clear to auscultation bilaterally, normal percussion bilaterally, no tenderness/ rash  Heart: Regular rate and rhythm, S1S2 present or without murmur or extra heart sounds  Abdomen: protuberant, bowel sounds normoactive, tympanic, abdomen is soft without significant tenderness, masses, organomegaly or guarding  Extremity: no edema, cyanosis, clubbing  Neuro: alert, oriented x 3, no defects noted in limited exam.  Skin: Skin color, texture, turgor normal.      Labs Reviewed:  Recent Results (from the past 24 hour(s))   CBC WITH AUTOMATED DIFF    Collection Time: 04/08/21  2:40 AM   Result Value Ref Range    WBC 12.1 4.6 - 13.2 K/uL    RBC 3.28 (L) 4.35 - 5.65 M/uL    HGB 9.7 (L) 13.0 - 16.0 g/dL    HCT 27.9 (L) 36.0 - 48.0 %    MCV 85.1 74.0 - 97.0 FL    MCH 29.6 24.0 - 34.0 PG    MCHC 34.8 31.0 - 37.0 g/dL    RDW 13.3 11.6 - 14.5 %    PLATELET 270 779 - 919 K/uL    MPV 8.4 (L) 9.2 - 11.8 FL    NEUTROPHILS 91 (H) 40 - 73 %    LYMPHOCYTES 4 (L) 21 - 52 %    MONOCYTES 4 3 - 10 %    EOSINOPHILS 0 0 - 5 %    BASOPHILS 0 0 - 2 %    ABS. NEUTROPHILS 10.9 (H) 1.8 - 8.0 K/UL    ABS. LYMPHOCYTES 0.5 (L) 0.9 - 3.6 K/UL    ABS. MONOCYTES 0.4 0.05 - 1.2 K/UL    ABS. EOSINOPHILS 0.0 0.0 - 0.4 K/UL    ABS.  BASOPHILS 0.0 0.0 - 0.1 K/UL    DF AUTOMATED     RENAL FUNCTION PANEL    Collection Time: 04/08/21  2:40 AM   Result Value Ref Range    Sodium 131 (L) 136 - 145 mmol/L    Potassium 3.5 3.5 - 5.5 mmol/L    Chloride 93 (L) 100 - 111 mmol/L    CO2 30 21 - 32 mmol/L    Anion gap 8 3.0 - 18 mmol/L    Glucose 145 (H) 74 - 99 mg/dL    BUN 41 (H) 7.0 - 18 MG/DL    Creatinine 1.27 0.6 - 1.3 MG/DL    BUN/Creatinine ratio 32 (H) 12 - 20      GFR est AA >60 >60 ml/min/1.73m2    GFR est non-AA 55 (L) >60 ml/min/1.73m2    Calcium 7.9 (L) 8.5 - 10.1 MG/DL    Phosphorus 3.3 2.5 - 4.9 MG/DL    Albumin 2.9 (L) 3.4 - 5.0 g/dL   ECHO STRESS    Collection Time: 04/08/21 11:31 AM   Result Value Ref Range    Target  bpm    IVSd 1.51 (A) 0.60 - 1.00 cm    LVIDd 5.86 4.20 - 5.90 cm    LVIDs 4.36 cm    LVPWd 1.16 (A) 0.60 - 1.00 cm    Left Atrium Major Axis 4.42 cm    AoV PG 10.65 mmHg    Aortic Valve Systolic Mean Gradient 2.49 mmHg    Aortic Valve Systolic Peak Velocity 252.83 cm/s    AoV VTI 36.28 cm    MV A Raul 100.14 cm/s    Mitral Valve E Wave Deceleration Time 132.93 ms    MV E Raul 81.25 cm/s    Mitral Valve Pressure Half-time 38.55 ms    MVA (PHT) 5.71 cm2    Ao Root D 2.87 cm IVC proximal 1.96 cm    MV E/A 0.81     LV Mass .6 88.0 - 224.0 g    LV Mass AL Index 162.6 49.0 - 115.0 g/m2    Left Atrium Minor Axis 2.06 cm    LV E' Septal Velocity 9.00 cm/s    LV E' Lateral Velocity 10.00 cm/s    BP EF 50.0 55.0 - 100.0 %    E/E' lateral 8.13     E/E' septal 9.03     ST Elevation (mm) 0 mm    ST Depression (mm) 0 mm    E/E' ratio (averaged) 8.58     Exercise duration time 00:05:12     Stress Base Systolic  mmHg    Stress Base Diastolic BP 80 mmHg    Post peak  BPM    Baseline HR 81 BPM    Estimated workload 1.0 METS           Recent Labs     04/05/21  1731   FIO2I 21   HCO3I 29.7*   PCO2I 40.5   PHI 7.47*   PO2I 69*       All Micro Results     Procedure Component Value Units Date/Time    COVID-19 RAPID TEST [[id number]] Collected: 04/05/21 1539    Order Status: Completed Specimen: Nasopharyngeal Updated: 04/05/21 1616     Specimen source Nasopharyngeal        COVID-19 rapid test Not detected        Comment: Rapid Abbott ID Now       Rapid NAAT:  The specimen is NEGATIVE for SARS-CoV-2, the novel coronavirus associated with COVID-19. Negative results should be treated as presumptive and, if inconsistent with clinical signs and symptoms or necessary for patient management, should be tested with an alternative molecular assay. Negative results do not preclude SARS-CoV-2 infection and should not be used as the sole basis for patient management decisions. This test has been authorized by the FDA under an Emergency Use Authorization (EUA) for use by authorized laboratories. Fact sheet for Healthcare Providers: ConventionUpdate.co.nz  Fact sheet for Patients: ConventionUpdate.co.nz       Methodology: Isothermal Nucleic Acid Amplification                 2/27/21 ECHO   · Left Ventricle: Normal cavity size, wall thickness and systolic function (ejection fraction normal). The estimated EF is 50 - 55%.  There is mild (grade 1) left ventricular diastolic dysfunction E'E= 10.05. Poor resolution of endocardial border. Can not comment on wall motion abnormality  · Tricuspid Valve: Tricuspid valve not well visualized. No stenosis. Tricuspid regurgitation is inadequate for estimation of right ventricular systolic pressure      Imaging:  [x]I have personally reviewed the patients chest radiographs images and report with the patient  4/5/21  AP PORTABLE CHEST 4/5/21   Comparison : 02/28/21    FINDINGS: Patient is rotated to the right. The chin obscures the upper thorax. There is mild motion degradation and external artifact which additionally limits visibility.     HEART AND MEDIASTINUM: Unremarkable.     LUNGS AND PLEURAL SPACES: Calcified pleural plaques present on the right. Vague  opacity in the right infrahilar region is less evident than the prior, but  degraded by motion. There is stable elevation of the right diaphragm.     BONY THORAX AND SOFT TISSUES: No acute osseous abnormality.     IMPRESSION     Improved aeration with mild, less evident ill-defined opacity in the right  infrahilar region, which can reflect chronic atelectasis or infiltrate. No  additional change. Results from [place] encounter on 04/05/21   XR KNEE LT MAX 2 VWS    Narrative HISTORY:   -Provided on order: pain, difficulty ambulating  -Additional: None    Technique: 2 views of left knee are presented for interpretation. Comparison study: none. Findings: The bones are osteopenic. There is no plain film evident fracture or  dislocation. No radiopaque foreign body or significant arthropathy. No  significant arthropathy. No plain film evident knee joint effusion is seen. Impression 1. No acute bony abnormality is identified by plain films. Intrinsic knee ligamentous, meniscal and cartilaginous integrity can be best  evaluated by routine knee MRI if clinically warranted.        Results from [place] encounter on 04/05/21   CT HIP LT WO CONT    Narrative ---------------------------------------------------------------------------  <<<<<<<<<           [facility]           >>>>>>>>>   ---------------------------------------------------------------------------    HISTORY:   -From Provider:r/o avn, not candidate for mri  -Additional: None    COMPARISON EXAMINATIONS:   None. TECHNIQUE:   CT of the left hip without intravenous contrast administration. Coronal and sagittal reformats were generated and reviewed. One or more dose reduction techniques were used on this CT: automated exposure  control, adjustment of the mAs and/or kVp according to patient size, and  iterative reconstruction techniques. The specific techniques used on this CT  exam have been documented in the patient's electronic medical record.      --------------------------     FINDINGS    --------------------------    OSSEOUS STRUCTURES:    Left hip: There is irregular subchondral lucency and cortical irregularity with  slight flattening/collapse of the left femoral head. Severe degenerative changes  in the right hip with severe joint space narrowing, subchondral cysts and slight  marginal spurring. Soft tissue thickening about the joint space suggesting hip  joint effusion. Visualized bony pelvis and proximal femur: Degenerative changes in the SI joints  and spine. Relatively severe central and foraminal stenosis at L4/5 and to  lesser degree at L3/4. Visualized pelvic soft tissues:  LYMPH NODES:  Subcentimeter short axis left groin lymph nodes are present. [ ]  GI TRACT:  Colonic diverticulosis, without CT evidence diverticulitis. Feculent  distention of the rectum. PELVIC ORGANS:  The visualized portion of the bladder is unremarkable. VASCULATURE:  Atherosclerotic calcification in visualized left iliac and femoral  arteries. OTHER:   No ascites or free intraperitoneal air in visualized extent. Fat-containing left inguinal hernia. Fatty change in the gluteal musculature  bilaterally. Subcutaneous stranding/edema along left more than right lateral  flank.      ---------------------------------------------------------------------------    Impression ---------------------------------------------------------------------------    1. Subchondral serpiginous lucencies in the weightbearing left femoral head  suspicious for avascular necrosis, with associated cortical irregularity in  keeping with flattening/collapse. Suspected left hip joint effusion. 2. Relatively severe mild degenerative changes in the left hip. Mild  degenerative changes in the right hip. Degenerative changes in the SI joints and  spine. In the setting of radiculopathy, routine lumbar spine MRI could best  further assess. [x]See my orders for details    My assessment, plan of care, findings, medications, side effects etc were discussed with:  [x]nursing []PT/OT    []respiratory therapy [x]Dr. Amadeo Villasenor   [x]family [x]Patient     [x]High complex decision making performed and > 50% time spent in face to face consultation.     Rosemarie Dillard MD

## 2021-04-08 NOTE — PROGRESS NOTES
Physical Therapy Evaluation/Treatment Attempt     Orders received, pt chart reviewed. Pt is planning to undergo L hip surgery pending pulmonary/cardiac clearance. Will hold hold PT evaluation until after surgical intervention. Please re-order Physical Therapy services post-operatively as appropriate.      Thank you for this referral.    Mica Luz, PT, DPT

## 2021-04-08 NOTE — PROGRESS NOTES
Hospitalist Progress Note-critical care note     Patient: Naomi Iyer MRN: [id number]  CSN: [id number]    YOB: 1945  Age: 66 y.o. Sex: male    DOA: 4/5/2021 LOS:  LOS: 3 days            Chief complaint: left hip pain, paf, hyponatremia. Debility , copd     Assessment/Plan         Hospital Problems  Date Reviewed: 2/27/2021          Codes Class Noted POA    Avascular necrosis of bone of left hip ([facility] ICD-10-CM: M87.052  ICD-9-CM: 733.42  4/8/2021 Unknown        Left hip pain ICD-10-CM: M25.552  ICD-9-CM: 719.45  4/5/2021 Unknown        PAF (paroxysmal atrial fibrillation) (HCC) ICD-10-CM: I48.0  ICD-9-CM: 427.31  4/5/2021 Unknown        Hypokalemia ICD-10-CM: E87.6  ICD-9-CM: 276.8  4/14/2020 Yes        Hyponatremia ICD-10-CM: E87.1  ICD-9-CM: 276.1  4/10/2020 Yes        Debility ICD-10-CM: R53.81  ICD-9-CM: 799.3  7/8/2019 Yes        * (Principal) Acute exacerbation of chronic obstructive pulmonary disease (COPD) ([facility] ICD-10-CM: J44.1  ICD-9-CM: 491.21  2/8/2019 Unknown        Asbestosis ([facility] ICD-10-CM: X41  ICD-9-CM: 997  10/19/2018 Yes        Hypertension ICD-10-CM: M05  ICD-9-CM: 401.9  Unknown Yes                Patient was admitted due to hyponatremia and hypokalemia and COPD exacerbation.        Acute COPD exacerbation  Continue improving   Iv steroid, breathing tx, empiric abx   Rapid covid 19 undetected      Asbestosis and chronic respiration failure with hypoxia   On home O2 2 L       paf   Continue cardizem      Hypertension: On Cardizem      Hypokalemia and hyponatremia   K  Replacement , Na is 131   Nephrologist f/u with pt      Fluid overloaded-resolving   dvt negative  Lasix per renal   Echo ef 43-14 % mild systolic dysfunction-done in Feb, 2021     Hearing loss   Having hearing aid   communication board      Hip pain -AVN -appreciated Dr. Jaclyn Diaz f/u with pt  Appreciated ortho/card/pulm on board       Subjective: how I feel better I can not walk.  I want to have surgery done Ok to go to rehab     CM was at the bedside     Disposition :tbd,   Review of systems:    General: No fevers or chills. Cardiovascular: No chest pain or pressure. No palpitations. Pulmonary: No shortness of breath. Gastrointestinal: No nausea, vomiting. Msk: left hip pain     Vital signs/Intake and Output:  Visit Vitals  /67   Pulse 80   Temp 97.7 °F (36.5 °C)   Resp 22   Ht 5' 7\" (1.702 m)   Wt 104.8 kg (231 lb)   SpO2 99%   BMI 36.18 kg/m²     Current Shift:  04/08 0701 - 04/08 1900  In: -   Out: 550 [Urine:550]  Last three shifts:  04/06 1901 - 04/08 0700  In: 1480 [P.O.:1480]  Out: 3400 [Urine:3400]    Physical Exam:  General: WD, WN. Alert, cooperative, no acute distress    HEENT: NC, Atraumatic. PERRLA, anicteric sclerae. Lungs: CTA Bilaterally. No Wheezing/Rhonchi/Rales. Heart:  Regular  rhythm,  No murmur, No Rubs, No Gallops  Abdomen: Soft, Non distended, Non tender. +Bowel sounds,   Extremities: No c/c/e   Psych:   Not anxious or agitated. Neurologic:  No acute neurological deficit. Labs: Results:       Chemistry Recent Labs     04/08/21 0240 04/07/21 0055 04/06/21  0505   * 163* 149*   * 130* 126*   K 3.5 3.2* 4.0   CL 93* 91* 89*   CO2 30 28 27   BUN 41* 39* 31*   CREA 1.27 1.56* 1.48*   CA 7.9* 8.4* 8.7   AGAP 8 11 10   BUCR 32* 25* 21*   ALB 2.9*  --   --       CBC w/Diff Recent Labs     04/08/21 0240 04/07/21 0055 04/06/21  0505   WBC 12.1 14.4* 11.4   RBC 3.28* 3.30* 3.54*   HGB 9.7* 9.7* 10.1*   HCT 27.9* 28.0* 29.8*    369 393   GRANS 91* 91* 94*   LYMPH 4* 4* 5*   EOS 0 0 0      Cardiac Enzymes No results for input(s): CPK, CKND1, WILLARD in the last 72 hours. No lab exists for component: CKRMB, TROIP   Coagulation No results for input(s): PTP, INR, APTT, INREXT, INREXT in the last 72 hours.     Lipid Panel No results found for: CHOL, CHOLPOCT, CHOLX, CHLST, CHOLV, 877918, HDL, HDLP, LDL, LDLC, DLDLP, 690867, VLDLC, VLDL, TGLX, TRIGL, TRIGP, TGLPOCT, CHHD, CHHDX   BNP No results for input(s): BNPP in the last 72 hours. Liver Enzymes Recent Labs     04/08/21  0240   ALB 2.9*      Thyroid Studies Lab Results   Component Value Date/Time    TSH 3.06 04/05/2021 01:30 PM        Procedures/imaging: see electronic medical records for all procedures/Xrays and details which were not copied into this note but were reviewed prior to creation of Plan    Xr Hip Lt W Or Wo Pelv 2-3 Vws    Result Date: 4/5/2021  EXAM: LEFT HIP RADIOGRAPHS CLINICAL INDICATION/HISTORY: left hip pain, difficulty ambulating -Additional: None COMPARISON: Supine with 2/26/2021 TECHNIQUE: AP pelvis and frog-leg lateral view of left hip. _______________ FINDINGS: BONES: Severe left hip osteoarthrosis with femoral head remodeling and collapse. No evidence of acute displaced fracture or dislocation. SOFT TISSUES: Unremarkable. _______________     Severe left hip osteoarthrosis with femoral head collapse/remodeling. Sclerosis and lucency in femoral head may reflect collapse/remodeling versus AVN. Consider MRI in the appropriate clinical setting. No evidence of acute fracture or dislocation. Xr Knee Lt Max 2 Vws    Result Date: 4/5/2021  HISTORY: -Provided on order: pain, difficulty ambulating -Additional: None Technique: 2 views of left knee are presented for interpretation. Comparison study: none. Findings: The bones are osteopenic. There is no plain film evident fracture or dislocation. No radiopaque foreign body or significant arthropathy. No significant arthropathy. No plain film evident knee joint effusion is seen. 1. No acute bony abnormality is identified by plain films. Intrinsic knee ligamentous, meniscal and cartilaginous integrity can be best evaluated by routine knee MRI if clinically warranted. Xr Chest Port    Result Date: 4/5/2021  HISTORY: -Provided with order: SOB -Additional: Bilateral lower extremity swelling.  Technique : AP PORTABLE CHEST Comparison : 02/28/21 FINDINGS: Patient is rotated to the right. The chin obscures the upper thorax. There is mild motion degradation and external artifact which additionally limits visibility. HEART AND MEDIASTINUM: Unremarkable. LUNGS AND PLEURAL SPACES: Calcified pleural plaques present on the right. Vague opacity in the right infrahilar region is less evident than the prior, but degraded by motion. There is stable elevation of the right diaphragm. BONY THORAX AND SOFT TISSUES: No acute osseous abnormality. Improved aeration with mild, less evident ill-defined opacity in the right infrahilar region, which can reflect chronic atelectasis or infiltrate. No additional change. Duplex Lower Ext Venous Bilat    Result Date: 4/5/2021  · No evidence of deep vein thrombosis in the right lower extremity veins assessed. · No evidence of deep vein thrombosis in the left lower extremity veins assessed.         Caitlyn Ward MD

## 2021-04-08 NOTE — PROGRESS NOTES
Physician Progress Note      Cristobal Rooney  CSN #:                  [id number]  :                       1945  ADMIT DATE:       2021 1:08 PM  [address] [place] DATE:  RESPONDING  PROVIDER #:        Blanca Juarez MD          QUERY TEXT:    Dear Hospitalist,    Pt admitted with Acute exacerbation of chronic obstructive pulmonary disease. Pt noted to have elevated WBC, tachycardia and increased RR . If possible, please document in the progress notes and discharge summary if you are evaluating and /or treating any of the following: The medical record reflects the following:    Risk Factors: COPD with chronic bronchitis, Debility, PMH CAP, Asbestosis    Clinical Indicators:  > WBC 14.4  > HR 91, 93, 94, 95, 97, 99  > RR 22, 23, 25, 29  > /57, 115/58, 118/56  > Sodium 126, 130, 131  > Potassium 2.8, 3.2    Treatment: Receiving Rocephin, Zithromax    Thank you,  Hodan Ordoñez, RN, BSN, [place], [address] [facility]  Options provided:  -- Sepsis, not present on admission  -- No Sepsis, COPD with chronic bronchitis only  -- Other - I will add my own diagnosis  -- Disagree - Not applicable / Not valid  -- Disagree - Clinically unable to determine / Unknown  -- Refer to Clinical Documentation Reviewer    PROVIDER RESPONSE TEXT:    This patient has sepsis that was not present on admission.     Query created by: Chel Tejeda on 2021 2:02 PM      Electronically signed by:  Blanca Juarez MD 2021 2:08 PM

## 2021-04-08 NOTE — CONSULTS
Palliative Medicine Consult    Patient Name: Sanya Simms  YOB: 1945    Date of Initial Consult: April 7, 2021, April 8, 2021  Reason for Consult: Goals of care discussions  Requesting Provider: Dr. Juan Pablo Cervantes  Primary Care Physician: Lise Hodgkin, MD      SUMMARY:   Sanya Simms is a 66 y.o. with a past history of PAF, COPD, asbetosis, chronic respiratory failure with hypoxia 2 L, who was admitted on 4/5/2021 from home with a diagnosis of COPD exacerbation, hyponatremia, hypokalemia, and left hip pain. Current medical issues leading to Palliative Medicine involvement include: Support and goals of care discussions. April 8, 2021:  Patient is awake, alert, sitting up on edge of bed, less tearful today. PALLIATIVE DIAGNOSES:   1. Goals of care discussions  2. Acute on chronic COPD  3. Left hip pain  4. Advanced age/debility       PLAN:   April 8, 2021: Palliative medicine team including Kendy Hopper RN and I met with patient at patient's bedside. Patient is awake, alert, sitting up on edge of bed, less tearful today. He is a little bit more positive about the surgery, and looking forward to the preop testing. He states \"I am hoping for the best.\"  No family at bedside, but states that a daughter will come in to see him today. No changes in goals of care; patient is a Partial code: DNR in the event of cardiopulmonary arrest, okay with intubation prearrest (2-week trial, no tracheostomy), no feeding tubes. We will continue to follow for support. See previous discussions below    April 7, 2021: Goals of care discussions: Palliative medicine team including Kendy Hopper RN and I met with patient and patient's bedside. Patient is awake, alert, sitting up on the edge of the bed and crying. Support offered as patient shares he is a little sad and anxious about needing left hip surgery because he knows he is older in age and a high risk surgical candidate due to his chronic comorbidities. He states he is not afraid to die, but he is sad of the idea of leaving his family behind. Empathetic listening provided. Patient states he is willing to go through with a hip surgery, because as of right now he cannot walk due to excruciating pain to the left hip upon ambulation. He states he would rather take the risk of surgery then to live the rest of his life wheelchair-bound. Support offered. Palliative team met with patient yesterday and reviewed current AMD and POST form which remains his current wishes. Patient is a Partial code: DNR in the event of cardiopulmonary arrest, okay with intubation prearrest (2-week trial, no tracheostomy), no feeding tubes. We will continue to follow for support. 1. Acute on chronic COPD: COPD exacerbation. Weaning steroids, mildly improving, known history of asbestosis and chronic respiratory failure with hypoxia, he is on home O2 at 2 L. Able to talk in complete sentences and breathing comfortably even when crying. 2. Left hip pain: Ortho consulted. Per patient, he needs hip surgery because \"the hip is gone\". 3. Advanced age/debility: 13-year-old male who lives with his wife, Herbert Woodard. He has five children. He lives in a single floor home and usually independent with ADLs but does use assistive devices including a cane and a walker for ambulatory assist.  Has not been able to drive recently due to shortness of breath and debility. 4. Initial consult note routed to primary continuity provider  5.  Communicated plan of care with: Palliative IDT       GOALS OF CARE / TREATMENT PREFERENCES:   [====Goals of Care====]  GOALS OF CARE: Partial code: DNR in the event of cardiopulmonary arrest, okay with intubation pre-arrest (2-week trial, no tracheostomy), no feeding tubes  Patient/Health Care Proxy Stated Goals: Prolong life      TREATMENT PREFERENCES:   Code Status: Partial Code    Advance Care Planning:  Advance Care Planning 4/5/2021   Patient's Healthcare Decision Maker is: -   Primary Decision Maker Name -   Primary Decision Maker Phone Number -   Primary Decision Maker Relationship to Patient -   Confirm Advance Directive Yes, on file   Patient Would Like to Complete Advance Directive -   Does the patient have other document types -       Medical Interventions: Full interventions           The palliative care team has discussed with patient / health care proxy about goals of care / treatment preferences for patient.  [====Goals of Care====]         HISTORY:     History obtained from: Patient, chart    CHIEF COMPLAINT: Anxious about the potential need for surgery for his left hip, because he knows he is a high risk surgical candidate but he is willing to do the surgery so that he can be ambulatory again. HPI/SUBJECTIVE:    The patient is:   [x] Verbal and participatory  [] Non-participatory due to:   Oriented x4    Clinical Pain Assessment (nonverbal scale for severity on nonverbal patients):   Clinical Pain Assessment  Severity: 0            FUNCTIONAL ASSESSMENT:     Palliative Performance Scale (PPS):  PPS: 50       PSYCHOSOCIAL/SPIRITUAL SCREENING:     Advance Care Planning:  Advance Care Planning 4/5/2021   Patient's Healthcare Decision Maker is: -   Primary Decision Maker Name -   Primary Decision Maker Phone Number -   Primary Decision Maker Relationship to Patient -   Confirm Advance Directive Yes, on file   Patient Would Like to Complete Advance Directive -   Does the patient have other document types -        Any spiritual / Latter day concerns:  [] Yes /  [x] No    Caregiver Burnout:  [] Yes /  [] No /  [x] No Caregiver Present      Anticipatory grief assessment:   [x] Normal  / [] Maladaptive              REVIEW OF SYSTEMS:     Positive and pertinent negative findings in ROS are noted above in HPI. The following systems were [x] reviewed / [] unable to be reviewed as noted in HPI  Other findings are noted below.   Systems: constitutional, ears/nose/mouth/throat, respiratory, gastrointestinal, genitourinary, musculoskeletal, integumentary, neurologic, psychiatric, endocrine. Positive findings noted below. Modified ESAS Completed by: provider   Fatigue: 4       Pain: 0(10 out of 10 with ambulation to left hip)   Anxiety: 0 Nausea: 0     Dyspnea: 0     Constipation: No     Stool Occurrence(s): 1        PHYSICAL EXAM:     From RN flowsheet:  Wt Readings from Last 3 Encounters:   04/05/21 104.8 kg (231 lb)   04/06/21 104.8 kg (231 lb 0.7 oz)   03/01/21 94.2 kg (207 lb 10.8 oz)     Blood pressure (!) 151/76, pulse 91, temperature 97.3 °F (36.3 °C), resp. rate 20, height 5' 7\" (1.702 m), weight 104.8 kg (231 lb), SpO2 98 %.     Pain Scale 1: Numeric (0 - 10)  Pain Intensity 1: 0     Pain Location 1: Head  Pain Orientation 1: Left  Pain Description 1: Aching  Pain Intervention(s) 1: Medication (see MAR)      Constitutional: Awake, alert, sitting up on edge of bed, less sad today  Eyes: pupils equal, anicteric  ENMT: no nasal discharge, moist mucous membranes  Cardiovascular: regular rhythm  Respiratory: breathing not labored, symmetric  Musculoskeletal: no deformity  Skin: warm, dry  Neurologic: following commands, moving all extremities, oriented x4  Psychiatric: full affect, no hallucinations       HISTORY:     Principal Problem:    Acute exacerbation of chronic obstructive pulmonary disease (COPD) ([facility] (2/8/2019)    Active Problems:    Hypertension ()      Asbestosis ([address] (10/19/2018)      Debility (7/8/2019)      Hyponatremia (4/10/2020)      Hypokalemia (4/14/2020)      Left hip pain (4/5/2021)      PAF (paroxysmal atrial fibrillation) ([address] (4/5/2021)      Past Medical History:   Diagnosis Date    Brain aneurysm     Cancer ([address]     prostate    COPD (chronic obstructive pulmonary disease) ([address]     Hypertension     Nocturia     Pneumonia     Radiation effect       Past Surgical History:   Procedure Laterality Date    HX HERNIA REPAIR Family History   Problem Relation Age of Onset    Heart Disease Mother       History reviewed, no pertinent family history.   Social History     Tobacco Use    Smoking status: Former Smoker     Types: Cigarettes    Smokeless tobacco: Never Used   Substance Use Topics    Alcohol use: No     Allergies   Allergen Reactions    Aspirin Other (comments)     \"messes my stomach up\"      Current Facility-Administered Medications   Medication Dose Route Frequency    perflutren lipid microspheres (DEFINITY) in NS bolus IV  1 mL IntraVENous RAD ONCE    perflutren lipid microspheres (DEFINITY) in NS bolus IV  1 mL IntraVENous RAD ONCE    sodium chloride (OCEAN) 0.65 % nasal squeeze bottle 2 Spray  2 Spray Both Nostrils Q2H PRN    methylPREDNISolone (PF) (Solu-MEDROL) injection 40 mg  40 mg IntraVENous Q12H    famotidine (PEPCID) tablet 20 mg  20 mg Oral DAILY    ipratropium (ATROVENT) 0.02 % nebulizer solution 0.5 mg  0.5 mg Nebulization QID RT    amLODIPine (NORVASC) tablet 5 mg  5 mg Oral DAILY    tamsulosin (FLOMAX) capsule 0.4 mg  0.4 mg Oral QPM    dilTIAZem IR (CARDIZEM) tablet 30 mg  30 mg Oral TIDAC    furosemide (LASIX) tablet 20 mg  20 mg Oral DAILY    azithromycin (ZITHROMAX) tablet 500 mg  500 mg Oral Q24H    cefTRIAXone (ROCEPHIN) 1 g in sterile water (preservative free) 10 mL IV syringe  1 g IntraVENous Q24H    sodium chloride (NS) flush 5-40 mL  5-40 mL IntraVENous Q8H    sodium chloride (NS) flush 5-40 mL  5-40 mL IntraVENous PRN    acetaminophen (TYLENOL) tablet 650 mg  650 mg Oral Q6H PRN    Or    acetaminophen (TYLENOL) suppository 650 mg  650 mg Rectal Q6H PRN    bisacodyL (DULCOLAX) suppository 10 mg  10 mg Rectal DAILY PRN    promethazine (PHENERGAN) tablet 12.5 mg  12.5 mg Oral Q6H PRN    Or    ondansetron (ZOFRAN) injection 4 mg  4 mg IntraVENous Q6H PRN    enoxaparin (LOVENOX) injection 40 mg  40 mg SubCUTAneous DAILY    budesonide (PULMICORT) 500 mcg/2 ml nebulizer suspension 500 mcg Nebulization BID RT    arformoteroL (BROVANA) neb solution 15 mcg  15 mcg Nebulization BID RT    albuterol-ipratropium (DUO-NEB) 2.5 MG-0.5 MG/3 ML  3 mL Nebulization Q4H PRN          LAB AND IMAGING FINDINGS:     Lab Results   Component Value Date/Time    WBC 12.1 04/08/2021 02:40 AM    HGB 9.7 (L) 04/08/2021 02:40 AM    PLATELET 894 93/78/0598 02:40 AM     Lab Results   Component Value Date/Time    Sodium 131 (L) 04/08/2021 02:40 AM    Potassium 3.5 04/08/2021 02:40 AM    Chloride 93 (L) 04/08/2021 02:40 AM    CO2 30 04/08/2021 02:40 AM    BUN 41 (H) 04/08/2021 02:40 AM    Creatinine 1.27 04/08/2021 02:40 AM    Calcium 7.9 (L) 04/08/2021 02:40 AM    Magnesium 1.9 04/05/2021 01:30 PM    Phosphorus 3.3 04/08/2021 02:40 AM      Lab Results   Component Value Date/Time    Alk. phosphatase 112 02/28/2021 04:45 AM    Protein, total 6.1 (L) 02/28/2021 04:45 AM    Albumin 2.9 (L) 04/08/2021 02:40 AM    Globulin 3.4 02/28/2021 04:45 AM     Lab Results   Component Value Date/Time    INR 0.9 07/09/2019 05:20 AM    Prothrombin time 12.3 07/09/2019 05:20 AM    aPTT 110.0 (H) 11/01/2019 04:40 AM      No results found for: IRON, FE, TIBC, IBCT, PSAT, FERR   No results found for: PH, PCO2, PO2  No components found for: [place]   Lab Results   Component Value Date/Time     04/05/2021 01:30 PM    CK - MB 4.6 (H) 04/05/2021 01:30 PM                Total time: 15 minutes  Counseling / coordination time, spent as noted above: 10 minutes  > 50% counseling / coordination?:  Yes, patient    Prolonged service was provided for  []30 min   []75 min in face to face time in the presence of the patient, spent as noted above. Time Start:   Time End:   Note: this can only be billed with 79379 (initial) or 20782 (follow up). If multiple start / stop times, list each separately.

## 2021-04-08 NOTE — PROGRESS NOTES
OT orders received, pt chart reviewed. Note plan for surgical intervention for L hip, pt pending cadiology/pulmonary clearance. Please re-order Occupational Therapy services post-op when appropriate.     Thank you for this referral.  Kaila Hernandez OTR/GLENN

## 2021-04-08 NOTE — PROGRESS NOTES
D/C Plan: SNF    Noted plan for orthopedic surgical intervention (left hip surgery). Pt needs to get cardiology and pulmonary clearance to proceed with L hip surgery. Please re consult therapy following surgical intervention to assist with transition of care. CM to continue to follow and assist.    1015:  CM and provider met with pt at bedside to discuss transition of care. Pt is now agreeable to going to SNF. Pt is aware he is pending surgery.   CM to continue to follow and assist.    Care Management Interventions  Mode of Transport at Discharge: S  Transition of Care Consult (CM Consult): Discharge Planning, SNF  Health Maintenance Reviewed: Yes  Physical Therapy Consult: Yes  Occupational Therapy Consult: Yes  Current Support Network: Lives with Spouse  The Plan for Transition of Care is Related to the Following Treatment Goals : [facility] [place] and physician follow up vs SNF  The Patient and/or Patient Representative was Provided with a Choice of Provider and Agrees with the Discharge Plan?: Yes  Name of the Patient Representative Who was Provided with a Choice of Provider and Agrees with the Discharge Plan: [facility]  Freedom of Choice List was Provided with Basic Dialogue that Supports the Patient's Individualized Plan of Care/Goals, Treatment Preferences and Shares the Quality Data Associated with the Providers?: Yes  Discharge Location  Discharge Placement: Skilled nursing facility(vs [facility])

## 2021-04-08 NOTE — PROGRESS NOTES
Problem: Chronic Obstructive Pulmonary Disease (COPD)  Goal: *Absence of hypoxia  Outcome: Progressing Towards Goal     Problem: Falls - Risk of  Goal: *Absence of Falls  Description: Document Darell Fall Risk and appropriate interventions in the flowsheet.   Outcome: Progressing Towards Goal  Note: Fall Risk Interventions:  Mobility Interventions: Patient to call before getting OOB         Medication Interventions: Patient to call before getting OOB    Elimination Interventions: Bed/chair exit alarm, Patient to call for help with toileting needs

## 2021-04-08 NOTE — PROGRESS NOTES
Palliative Medicine     CODE STATUS: PARTIAL CODE (DNR, okay for intubation with two week trial of ventilation, no feeding tubes) Has POST on file dated 8/7/2020 . Dr Fab Abbasi updated on patient's wishes     AMD Status: Has completed AMD naming his wife Sarah Bay as his primary surrogate decision maker and his daughter, Vamshi Morales, as his surrogate decision maker. Reviewed this document and he says his wishes remain the same                Date of Initial Consult: 4/6/2021  F/U visit: 4/7/2021; 4/8/2021  Reason for Consult: goals of care discussion; support  Requesting Provider: Dr Fab Abbasi  Primary Care Physician: Dr Melissa Salmon     F/U VISIT   (seen with Marissa Hall NP)     PMH: (obtained from medical record and patient)   Prostate Ca s/p radiation; brain aneurysm; COPD; HTN; Afib     History relative to admission: Came to the ED 4/5/2021 from home with shortness of breath that has been worsening over the past 1-2 weeks and left knee and hip pain. He is on home O2. Has had multiple admissions recently for worsening COPD    4/8/2021 Seen today in room 341. Awake, alert. Oriented x 4 Respirations unlabored with O2 on at 2 lpm per NC. Pain remains in his left hip. Has seen cardiology and pulmonology as part of his preoperative workup. Remains hopeful for a good surgical outcome. Hopeful that family will visit today. Disposition plan:pending surgery scheduling.     Palliative Medicine remains available for any questions or concerns    Flavia Wells RN, MSN  P: [phone]

## 2021-04-08 NOTE — PROGRESS NOTES
Nephrology Inpatient Progress Note    Subjective:     Maru Wall is a 66 y.o.  male with a PMH of HTN, COPD on home O2, P Afib, hx Covid 2/26 then s/p J&J Covid vax 3 weeks ago now presenting with SOB. Pt c/o swelling, arthirtis- no N/V, diarrhea, CP, Denies fever. Na here 126 on 4/5/21. Back on 3/3/21, baseline Na was 139. Na up to 131 today and Cr down to 1.27.  UO over 2.2 L yesterday. Seeing cardiology for pre-op clearance.     Admit Date: 4/5/2021  Patient Active Problem List   Diagnosis Code    Hypertension I10    COPD (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 ([facility]) J44.9    Chronic renal disease, stage 3, moderately decreased glomerular filtration rate between 30-59 mL/min/1.73 square meter ([facility]) N18.30    Asbestosis ([facility] [address] J61    Acute exacerbation of chronic obstructive pulmonary disease (COPD) ([facility] [address] J44.1    Debility R53.81    Paroxysmal atrial fibrillation ([facility]) I48.0    Chronic respiratory failure with hypoxia ([facility]) J96.11    Tobacco dependence F17.200    Hyponatremia E87.1    Pleural effusion, right J90    COPD with acute exacerbation ([facility] [address] J44.1    Acute respiratory failure with hypoxia and hypercarbia ([facility]) J96.01, J96.02    Hyponatremia E87.1    Advanced care planning/counseling discussion Z71.89    Hypokalemia E87.6    COPD (chronic obstructive pulmonary disease) ([facility]) J44.9    CAP (community acquired pneumonia) J18.9    Respiratory distress R06.03    COPD exacerbation ([facility]) J44.1    Atrial fibrillation with RVR ([facility]) I48.91    Acute respiratory failure ([facility]) J96.00    Left hip pain M25.552    PAF (paroxysmal atrial fibrillation) ([facility]) I48.0     Current Facility-Administered Medications   Medication Dose Route Frequency    perflutren lipid microspheres (DEFINITY) in NS bolus IV  1 mL IntraVENous RAD ONCE    sodium chloride (OCEAN) 0.65 % nasal squeeze bottle 2 Spray  2 Spray Both Nostrils Q2H PRN    methylPREDNISolone (PF) (Solu-MEDROL) injection 40 mg  40 mg IntraVENous Q12H    famotidine (PEPCID) tablet 20 mg  20 mg Oral DAILY    ipratropium (ATROVENT) 0.02 % nebulizer solution 0.5 mg  0.5 mg Nebulization QID RT    amLODIPine (NORVASC) tablet 5 mg  5 mg Oral DAILY    tamsulosin (FLOMAX) capsule 0.4 mg  0.4 mg Oral QPM    dilTIAZem IR (CARDIZEM) tablet 30 mg  30 mg Oral TIDAC    furosemide (LASIX) tablet 20 mg  20 mg Oral DAILY    azithromycin (ZITHROMAX) tablet 500 mg  500 mg Oral Q24H    cefTRIAXone (ROCEPHIN) 1 g in sterile water (preservative free) 10 mL IV syringe  1 g IntraVENous Q24H    sodium chloride (NS) flush 5-40 mL  5-40 mL IntraVENous Q8H    sodium chloride (NS) flush 5-40 mL  5-40 mL IntraVENous PRN    acetaminophen (TYLENOL) tablet 650 mg  650 mg Oral Q6H PRN    Or    acetaminophen (TYLENOL) suppository 650 mg  650 mg Rectal Q6H PRN    bisacodyL (DULCOLAX) suppository 10 mg  10 mg Rectal DAILY PRN    promethazine (PHENERGAN) tablet 12.5 mg  12.5 mg Oral Q6H PRN    Or    ondansetron (ZOFRAN) injection 4 mg  4 mg IntraVENous Q6H PRN    enoxaparin (LOVENOX) injection 40 mg  40 mg SubCUTAneous DAILY    budesonide (PULMICORT) 500 mcg/2 ml nebulizer suspension  500 mcg Nebulization BID RT    arformoteroL (BROVANA) neb solution 15 mcg  15 mcg Nebulization BID RT    albuterol-ipratropium (DUO-NEB) 2.5 MG-0.5 MG/3 ML  3 mL Nebulization Q4H PRN       Allergy:   Allergies   Allergen Reactions    Aspirin Other (comments)     \"messes my stomach up\"        Objective:     Visit Vitals  BP (!) 148/73   Pulse 95   Temp 97.8 °F (36.6 °C)   Resp 22   Ht 5' 7\" (1.702 m)   Wt 104.8 kg (231 lb)   SpO2 98%   BMI 36.18 kg/m²       Intake/Output Summary (Last 24 hours) at 4/8/2021 1153  Last data filed at 4/8/2021 0433  Gross per 24 hour   Intake 1120 ml   Output 1950 ml   Net -830 ml       Physical Exam:       General: No acute distress   HENT: Atraumatic and normocephalic   Eyes: Normal conjunctiva   Neck: Supple with no JVD/mass Cardiovascular: Normal S1 & S2, no m/r/g   Pulmonary/Chest Wall: Clear to auscultation bilaterally, no w/c   Abdominal: Soft and non-tender   Musculoskeletal: + edema    Neurological: No focal deficits       Data Review:  Recent Labs     04/08/21 0240 04/07/21  0055   * 130*   K 3.5 3.2*   CL 93* 91*   CO2 30 28   BUN 41* 39*   CREA 1.27 1.56*   * 163*   PHOS 3.3  --    CA 7.9* 8.4*     Recent Labs     04/08/21  0240 04/07/21  0055   WBC 12.1 14.4*   HGB 9.7* 9.7*   HCT 27.9* 28.0*    369     Recent Labs     04/08/21 0240 04/05/21  1330   ALB 2.9* 3.5     No results for input(s): INR, PTP, APTT, INREXT, INREXT in the last 72 hours. No results for input(s): IRON, FE, TIBC, IBCT, PSAT, FERR in the last 72 hours. Most recent labs: reviewed. Impression:     Hyponatremia, hypervolemic, acute. Na up to 131 now, with baseline Na 139. Veronica 29. Has normal TSH. COPD, stable. Neg rapid COVID test  Hypokalemia, mild, K up to 3.5  CKD stage 3b, baseline 1.2 to 1.5 in recent months, Cr 1.27 now  HTN  Paroxysmal Afib  Arthiritis with left hip pain  Anemia    Plan:     Cardiazem for afib  Cont lasix 20mg po daily.     Strict I/Os  Following serum Na in AM  Avoid NSAIDs  Pending dobutamine stress echo per cardiology    Carlyn Mathur 6 [phone]

## 2021-04-08 NOTE — PROGRESS NOTES
Cardiology Progress Note        Patient: Brandi Garland        Sex: male          DOA: 4/5/2021  YOB: 1945      Age:  66 y.o.        LOS:  LOS: 3 days   Assessment/Plan     Principal Problem:    Acute exacerbation of chronic obstructive pulmonary disease (COPD) ([facility] [address] (2/8/2019)    Active Problems:    Hypertension ()      Asbestosis ([facility] [address] (10/19/2018)      Debility (7/8/2019)      Hyponatremia (4/10/2020)      Hypokalemia (4/14/2020)      Left hip pain (4/5/2021)      PAF (paroxysmal atrial fibrillation) ([facility] [address] (4/5/2021)      Avascular necrosis of bone of left hip ([facility] [address] (4/8/2021)        Plan:   preoperative cardiac clearance     dobutamine stress echocardiogram is negative for ischemia. No evidence of ACS / acute CHF. On the basis of AHA/ACC guidelines patient is a acceptable risk for hip surgery. consider therapeutic anticoagulation after hip surgery for stroke prevention due to history of paroxysmal atrial fibrillation. I will sign off, please call if you have any questions or concerns. Discussed with patient                      Subjective:    cc:  Preoperative cardiac clearance        REVIEW OF SYSTEMS:     General: No fevers or chills. Cardiovascular: No chest pain or pressure. No palpitations. No ankle swelling  Pulmonary: No SOB, orthopnea, PND  Gastrointestinal: No nausea, vomiting or diarrhea      Objective:      Visit Vitals  /67   Pulse 80   Temp 97.7 °F (36.5 °C)   Resp 22   Ht 5' 7\" (1.702 m)   Wt 104.8 kg (231 lb)   SpO2 99%   BMI 36.18 kg/m²     Body mass index is 36.18 kg/m². Physical Exam:  General Appearance: Comfortable, not using accessory muscles of respiration. NECK: No JVD, no thyroidomeglay. LUNGS: Clear bilaterally. HEART: S1+S2 audible,    ABD: Non-tender, BS Audible    EXT: + edema, and no cysnosis. VASCULAR EXAM: Pulses are intact. PSYCHIATRIC EXAM: Mood is appropriate.     Medication:  Current Facility-Administered Medications   Medication Dose Route Frequency    perflutren lipid microspheres (DEFINITY) in NS bolus IV  1 mL IntraVENous RAD ONCE    methylPREDNISolone (PF) (Solu-MEDROL) injection 30 mg  30 mg IntraVENous Q12H    sodium chloride (OCEAN) 0.65 % nasal squeeze bottle 2 Spray  2 Spray Both Nostrils Q2H PRN    famotidine (PEPCID) tablet 20 mg  20 mg Oral DAILY    ipratropium (ATROVENT) 0.02 % nebulizer solution 0.5 mg  0.5 mg Nebulization QID RT    amLODIPine (NORVASC) tablet 5 mg  5 mg Oral DAILY    tamsulosin (FLOMAX) capsule 0.4 mg  0.4 mg Oral QPM    dilTIAZem IR (CARDIZEM) tablet 30 mg  30 mg Oral TIDAC    furosemide (LASIX) tablet 20 mg  20 mg Oral DAILY    azithromycin (ZITHROMAX) tablet 500 mg  500 mg Oral Q24H    cefTRIAXone (ROCEPHIN) 1 g in sterile water (preservative free) 10 mL IV syringe  1 g IntraVENous Q24H    sodium chloride (NS) flush 5-40 mL  5-40 mL IntraVENous Q8H    sodium chloride (NS) flush 5-40 mL  5-40 mL IntraVENous PRN    acetaminophen (TYLENOL) tablet 650 mg  650 mg Oral Q6H PRN    Or    acetaminophen (TYLENOL) suppository 650 mg  650 mg Rectal Q6H PRN    bisacodyL (DULCOLAX) suppository 10 mg  10 mg Rectal DAILY PRN    promethazine (PHENERGAN) tablet 12.5 mg  12.5 mg Oral Q6H PRN    Or    ondansetron (ZOFRAN) injection 4 mg  4 mg IntraVENous Q6H PRN    enoxaparin (LOVENOX) injection 40 mg  40 mg SubCUTAneous DAILY    budesonide (PULMICORT) 500 mcg/2 ml nebulizer suspension  500 mcg Nebulization BID RT    arformoteroL (BROVANA) neb solution 15 mcg  15 mcg Nebulization BID RT    albuterol-ipratropium (DUO-NEB) 2.5 MG-0.5 MG/3 ML  3 mL Nebulization Q4H PRN               Lab/Data Reviewed:  Procedures/imaging: see electronic medical records for all procedures/Xrays   and details which were not copied into this note but were reviewed prior to creation of Plan       All lab results for the last 24 hours reviewed.      Recent Labs     04/08/21  0028 04/07/21  0055 04/06/21  0505   WBC 12.1 14.4* 11.4   HGB 9.7* 9.7* 10.1*   HCT 27.9* 28.0* 29.8*    369 393     Recent Labs     04/08/21  0240 04/07/21  0055 04/06/21  0505   * 130* 126*   K 3.5 3.2* 4.0   CL 93* 91* 89*   CO2 30 28 27   * 163* 149*   BUN 41* 39* 31*   CREA 1.27 1.56* 1.48*   CA 7.9* 8.4* 8.7       RADIOLOGY:  CT Results  (Last 48 hours)    None        CXR Results  (Last 48 hours)    None            Cardiology Procedures:   Results for orders placed or performed during the hospital encounter of 04/05/21   EKG, 12 LEAD, INITIAL   Result Value Ref Range    Ventricular Rate 89 BPM    Atrial Rate 89 BPM    P-R Interval 170 ms    QRS Duration 90 ms    Q-T Interval 386 ms    QTC Calculation (Bezet) 469 ms    Calculated P Axis 42 degrees    Calculated T Axis 30 degrees    Diagnosis       Poor data quality, interpretation may be adversely affected  Normal sinus rhythm  Poor R Wave Progression  Abnormal ECG  Confirmed by Golden Rivera MD, [facility] ([id number]) on 4/6/2021 1:08:42 AM        Echo Results  (Last 48 hours)    None       Cardiolite (Tc-99m Sestamibi) stress test    Signed By: Matt Rico MD     April 8, 2021

## 2021-04-08 NOTE — PROGRESS NOTES
0720 Assumed care of the patient from VINCE Mendoza RN (offgoing Nurse). Patient is alert and oriented. Pt denies any pain or discomfort at this moment. bed in low position, call bell within reach. 1900 Patient had an uneventful shift and remained stable. Purposeful hourly rounding completed throughout the shift, NAD noted at this time, and patient is resting quietly in bed. No concerns or requests voiced    1917 Bedside and Verbal shift change report given to Tony Figueroa RN (oncoming nurse) by Afua Vazquez RN (offgoing nurse). Report included the following information SBAR, Kardex, MAR, Recent Results and Cardiac Rhythm .

## 2021-04-09 ENCOUNTER — APPOINTMENT (OUTPATIENT)
Dept: GENERAL RADIOLOGY | Age: 76
DRG: 469 | End: 2021-04-09
Attending: PHYSICIAN ASSISTANT
Payer: MEDICARE

## 2021-04-09 ENCOUNTER — ANESTHESIA (OUTPATIENT)
Dept: SURGERY | Age: 76
DRG: 469 | End: 2021-04-09
Payer: MEDICARE

## 2021-04-09 ENCOUNTER — APPOINTMENT (OUTPATIENT)
Dept: GENERAL RADIOLOGY | Age: 76
DRG: 469 | End: 2021-04-09
Attending: ORTHOPAEDIC SURGERY
Payer: MEDICARE

## 2021-04-09 ENCOUNTER — ANESTHESIA EVENT (OUTPATIENT)
Dept: SURGERY | Age: 76
DRG: 469 | End: 2021-04-09
Payer: MEDICARE

## 2021-04-09 LAB
ALBUMIN SERPL-MCNC: 2.9 G/DL (ref 3.4–5)
ANION GAP SERPL CALC-SCNC: 8 MMOL/L (ref 3–18)
BASOPHILS # BLD: 0 K/UL (ref 0–0.1)
BASOPHILS NFR BLD: 0 % (ref 0–2)
BUN SERPL-MCNC: 38 MG/DL (ref 7–18)
BUN/CREAT SERPL: 31 (ref 12–20)
CALCIUM SERPL-MCNC: 7.9 MG/DL (ref 8.5–10.1)
CHLORIDE SERPL-SCNC: 96 MMOL/L (ref 100–111)
CO2 SERPL-SCNC: 30 MMOL/L (ref 21–32)
CREAT SERPL-MCNC: 1.21 MG/DL (ref 0.6–1.3)
DIFFERENTIAL METHOD BLD: ABNORMAL
EOSINOPHIL # BLD: 0 K/UL (ref 0–0.4)
EOSINOPHIL NFR BLD: 0 % (ref 0–5)
ERYTHROCYTE [DISTWIDTH] IN BLOOD BY AUTOMATED COUNT: 13.4 % (ref 11.6–14.5)
GLUCOSE SERPL-MCNC: 140 MG/DL (ref 74–99)
HCT VFR BLD AUTO: 29.3 % (ref 36–48)
HGB BLD-MCNC: 9.6 G/DL (ref 13–16)
LYMPHOCYTES # BLD: 0.4 K/UL (ref 0.9–3.6)
LYMPHOCYTES NFR BLD: 5 % (ref 21–52)
MCH RBC QN AUTO: 28.3 PG (ref 24–34)
MCHC RBC AUTO-ENTMCNC: 32.8 G/DL (ref 31–37)
MCV RBC AUTO: 86.4 FL (ref 74–97)
MONOCYTES # BLD: 0.4 K/UL (ref 0.05–1.2)
MONOCYTES NFR BLD: 5 % (ref 3–10)
NEUTS SEG # BLD: 7.4 K/UL (ref 1.8–8)
NEUTS SEG NFR BLD: 86 % (ref 40–73)
PHOSPHATE SERPL-MCNC: 2.5 MG/DL (ref 2.5–4.9)
PLATELET # BLD AUTO: 332 K/UL (ref 135–420)
PMV BLD AUTO: 8.5 FL (ref 9.2–11.8)
POTASSIUM SERPL-SCNC: 3.5 MMOL/L (ref 3.5–5.5)
RBC # BLD AUTO: 3.39 M/UL (ref 4.35–5.65)
SARS-COV-2, COV2: NORMAL
SODIUM SERPL-SCNC: 134 MMOL/L (ref 136–145)
WBC # BLD AUTO: 8.6 K/UL (ref 4.6–13.2)

## 2021-04-09 PROCEDURE — 77030020407 HC IV BLD WRMR ST 3M -A: Performed by: SPECIALIST

## 2021-04-09 PROCEDURE — U0003 INFECTIOUS AGENT DETECTION BY NUCLEIC ACID (DNA OR RNA); SEVERE ACUTE RESPIRATORY SYNDROME CORONAVIRUS 2 (SARS-COV-2) (CORONAVIRUS DISEASE [COVID-19]), AMPLIFIED PROBE TECHNIQUE, MAKING USE OF HIGH THROUGHPUT TECHNOLOGIES AS DESCRIBED BY CMS-2020-01-R: HCPCS

## 2021-04-09 PROCEDURE — 74011250636 HC RX REV CODE- 250/636: Performed by: PHYSICIAN ASSISTANT

## 2021-04-09 PROCEDURE — 65660000000 HC RM CCU STEPDOWN

## 2021-04-09 PROCEDURE — 74011250636 HC RX REV CODE- 250/636: Performed by: ANESTHESIOLOGY

## 2021-04-09 PROCEDURE — 77030031140 HC SUT VCRL3 J&J -A: Performed by: ORTHOPAEDIC SURGERY

## 2021-04-09 PROCEDURE — 76210000006 HC OR PH I REC 0.5 TO 1 HR: Performed by: ORTHOPAEDIC SURGERY

## 2021-04-09 PROCEDURE — C1776 JOINT DEVICE (IMPLANTABLE): HCPCS | Performed by: ORTHOPAEDIC SURGERY

## 2021-04-09 PROCEDURE — 86923 COMPATIBILITY TEST ELECTRIC: CPT

## 2021-04-09 PROCEDURE — 36415 COLL VENOUS BLD VENIPUNCTURE: CPT

## 2021-04-09 PROCEDURE — 77030034479 HC ADH SKN CLSR PRINEO J&J -B: Performed by: ORTHOPAEDIC SURGERY

## 2021-04-09 PROCEDURE — 77030014144 HC TY SPN ANES BBMI -B: Performed by: SPECIALIST

## 2021-04-09 PROCEDURE — 74011250637 HC RX REV CODE- 250/637: Performed by: PHYSICIAN ASSISTANT

## 2021-04-09 PROCEDURE — 77030002933 HC SUT MCRYL J&J -A: Performed by: ORTHOPAEDIC SURGERY

## 2021-04-09 PROCEDURE — 77030013708 HC HNDPC SUC IRR PULS STRY –B: Performed by: ORTHOPAEDIC SURGERY

## 2021-04-09 PROCEDURE — 77030013079 HC BLNKT BAIR HGGR 3M -A: Performed by: SPECIALIST

## 2021-04-09 PROCEDURE — 74011000250 HC RX REV CODE- 250: Performed by: ORTHOPAEDIC SURGERY

## 2021-04-09 PROCEDURE — 73501 X-RAY EXAM HIP UNI 1 VIEW: CPT

## 2021-04-09 PROCEDURE — 74011000250 HC RX REV CODE- 250: Performed by: INTERNAL MEDICINE

## 2021-04-09 PROCEDURE — C1713 ANCHOR/SCREW BN/BN,TIS/BN: HCPCS | Performed by: ORTHOPAEDIC SURGERY

## 2021-04-09 PROCEDURE — 77030003666 HC NDL SPINAL BD -A: Performed by: ORTHOPAEDIC SURGERY

## 2021-04-09 PROCEDURE — 80069 RENAL FUNCTION PANEL: CPT

## 2021-04-09 PROCEDURE — 74011250636 HC RX REV CODE- 250/636: Performed by: REGISTERED NURSE

## 2021-04-09 PROCEDURE — 76010000153 HC OR TIME 1.5 TO 2 HR: Performed by: ORTHOPAEDIC SURGERY

## 2021-04-09 PROCEDURE — 76060000034 HC ANESTHESIA 1.5 TO 2 HR: Performed by: ORTHOPAEDIC SURGERY

## 2021-04-09 PROCEDURE — 74011250636 HC RX REV CODE- 250/636: Performed by: INTERNAL MEDICINE

## 2021-04-09 PROCEDURE — 74011000250 HC RX REV CODE- 250: Performed by: REGISTERED NURSE

## 2021-04-09 PROCEDURE — 85025 COMPLETE CBC W/AUTO DIFF WBC: CPT

## 2021-04-09 PROCEDURE — 74011250636 HC RX REV CODE- 250/636: Performed by: ORTHOPAEDIC SURGERY

## 2021-04-09 PROCEDURE — 77030040361 HC SLV COMPR DVT MDII -B: Performed by: ORTHOPAEDIC SURGERY

## 2021-04-09 PROCEDURE — 74011000250 HC RX REV CODE- 250: Performed by: ANESTHESIOLOGY

## 2021-04-09 PROCEDURE — 77030035643 HC BLD SAW OSC PRECIS STRY -C: Performed by: ORTHOPAEDIC SURGERY

## 2021-04-09 PROCEDURE — 74011000250 HC RX REV CODE- 250: Performed by: HOSPITALIST

## 2021-04-09 PROCEDURE — 77010033678 HC OXYGEN DAILY

## 2021-04-09 PROCEDURE — 99231 SBSQ HOSP IP/OBS SF/LOW 25: CPT | Performed by: NURSE PRACTITIONER

## 2021-04-09 PROCEDURE — 77030034694 HC SCPL CANADY PLSM DISP USMD -E: Performed by: ORTHOPAEDIC SURGERY

## 2021-04-09 PROCEDURE — 86901 BLOOD TYPING SEROLOGIC RH(D): CPT

## 2021-04-09 PROCEDURE — 74011250637 HC RX REV CODE- 250/637: Performed by: HOSPITALIST

## 2021-04-09 PROCEDURE — 74011250637 HC RX REV CODE- 250/637: Performed by: INTERNAL MEDICINE

## 2021-04-09 PROCEDURE — 94640 AIRWAY INHALATION TREATMENT: CPT

## 2021-04-09 PROCEDURE — 77030031139 HC SUT VCRL2 J&J -A: Performed by: ORTHOPAEDIC SURGERY

## 2021-04-09 PROCEDURE — 77030029099 HC BN WAX SSPC -A: Performed by: ORTHOPAEDIC SURGERY

## 2021-04-09 PROCEDURE — 2709999900 HC NON-CHARGEABLE SUPPLY: Performed by: ORTHOPAEDIC SURGERY

## 2021-04-09 DEVICE — TRIDENT II TRITANIUM CLUSTER 58F
Type: IMPLANTABLE DEVICE | Site: HIP | Status: FUNCTIONAL
Brand: TRIDENT II

## 2021-04-09 DEVICE — HIP H2 TOT ADV OTHER HD -- IMPL CAPPED H2: Type: IMPLANTABLE DEVICE | Site: HIP | Status: FUNCTIONAL

## 2021-04-09 DEVICE — CERAMIC V40 FEMORAL HEAD
Type: IMPLANTABLE DEVICE | Site: HIP | Status: FUNCTIONAL
Brand: BIOLOX

## 2021-04-09 DEVICE — 132 DEGREE NECK ANGLE HIP STEM
Type: IMPLANTABLE DEVICE | Site: HIP | Status: FUNCTIONAL
Brand: ACCOLADE

## 2021-04-09 DEVICE — 0 DEGREE POLYETHYLENE INSERT
Type: IMPLANTABLE DEVICE | Site: HIP | Status: FUNCTIONAL
Brand: TRIDENT

## 2021-04-09 RX ORDER — NALOXONE HYDROCHLORIDE 0.4 MG/ML
0.1 INJECTION, SOLUTION INTRAMUSCULAR; INTRAVENOUS; SUBCUTANEOUS AS NEEDED
Status: DISCONTINUED | OUTPATIENT
Start: 2021-04-09 | End: 2021-04-09 | Stop reason: HOSPADM

## 2021-04-09 RX ORDER — SODIUM CHLORIDE 9 MG/ML
75 INJECTION, SOLUTION INTRAVENOUS CONTINUOUS
Status: DISCONTINUED | OUTPATIENT
Start: 2021-04-09 | End: 2021-04-10

## 2021-04-09 RX ORDER — FENTANYL CITRATE 50 UG/ML
INJECTION, SOLUTION INTRAMUSCULAR; INTRAVENOUS AS NEEDED
Status: DISCONTINUED | OUTPATIENT
Start: 2021-04-09 | End: 2021-04-09

## 2021-04-09 RX ORDER — FENTANYL CITRATE 50 UG/ML
50 INJECTION, SOLUTION INTRAMUSCULAR; INTRAVENOUS AS NEEDED
Status: DISCONTINUED | OUTPATIENT
Start: 2021-04-09 | End: 2021-04-09 | Stop reason: HOSPADM

## 2021-04-09 RX ORDER — PROCHLORPERAZINE EDISYLATE 5 MG/ML
5 INJECTION INTRAMUSCULAR; INTRAVENOUS ONCE
Status: DISCONTINUED | OUTPATIENT
Start: 2021-04-09 | End: 2021-04-09 | Stop reason: HOSPADM

## 2021-04-09 RX ORDER — MIDAZOLAM HYDROCHLORIDE 1 MG/ML
INJECTION, SOLUTION INTRAMUSCULAR; INTRAVENOUS AS NEEDED
Status: DISCONTINUED | OUTPATIENT
Start: 2021-04-09 | End: 2021-04-09 | Stop reason: HOSPADM

## 2021-04-09 RX ORDER — ENOXAPARIN SODIUM 100 MG/ML
40 INJECTION SUBCUTANEOUS DAILY
Status: DISCONTINUED | OUTPATIENT
Start: 2021-04-10 | End: 2021-04-09 | Stop reason: SDUPTHER

## 2021-04-09 RX ORDER — SODIUM CHLORIDE 0.9 % (FLUSH) 0.9 %
5-40 SYRINGE (ML) INJECTION AS NEEDED
Status: DISCONTINUED | OUTPATIENT
Start: 2021-04-09 | End: 2021-04-09 | Stop reason: HOSPADM

## 2021-04-09 RX ORDER — SODIUM CHLORIDE 0.9 % (FLUSH) 0.9 %
5-40 SYRINGE (ML) INJECTION EVERY 8 HOURS
Status: DISCONTINUED | OUTPATIENT
Start: 2021-04-09 | End: 2021-04-09 | Stop reason: HOSPADM

## 2021-04-09 RX ORDER — HYDROCODONE BITARTRATE AND ACETAMINOPHEN 7.5; 325 MG/1; MG/1
1 TABLET ORAL
Status: DISCONTINUED | OUTPATIENT
Start: 2021-04-09 | End: 2021-04-11

## 2021-04-09 RX ORDER — NALOXONE HYDROCHLORIDE 0.4 MG/ML
0.4 INJECTION, SOLUTION INTRAMUSCULAR; INTRAVENOUS; SUBCUTANEOUS AS NEEDED
Status: DISCONTINUED | OUTPATIENT
Start: 2021-04-09 | End: 2021-04-14 | Stop reason: HOSPADM

## 2021-04-09 RX ORDER — KETOROLAC TROMETHAMINE 30 MG/ML
15 INJECTION, SOLUTION INTRAMUSCULAR; INTRAVENOUS
Status: DISPENSED | OUTPATIENT
Start: 2021-04-09 | End: 2021-04-12

## 2021-04-09 RX ORDER — PROPOFOL 10 MG/ML
VIAL (ML) INTRAVENOUS
Status: DISCONTINUED | OUTPATIENT
Start: 2021-04-09 | End: 2021-04-09 | Stop reason: HOSPADM

## 2021-04-09 RX ORDER — FLUMAZENIL 0.1 MG/ML
0.2 INJECTION INTRAVENOUS
Status: DISCONTINUED | OUTPATIENT
Start: 2021-04-09 | End: 2021-04-09 | Stop reason: HOSPADM

## 2021-04-09 RX ORDER — SODIUM CHLORIDE, SODIUM LACTATE, POTASSIUM CHLORIDE, CALCIUM CHLORIDE 600; 310; 30; 20 MG/100ML; MG/100ML; MG/100ML; MG/100ML
125 INJECTION, SOLUTION INTRAVENOUS CONTINUOUS
Status: DISCONTINUED | OUTPATIENT
Start: 2021-04-09 | End: 2021-04-10

## 2021-04-09 RX ORDER — BUPIVACAINE HYDROCHLORIDE 2.5 MG/ML
INJECTION, SOLUTION EPIDURAL; INFILTRATION; INTRACAUDAL AS NEEDED
Status: DISCONTINUED | OUTPATIENT
Start: 2021-04-09 | End: 2021-04-09 | Stop reason: HOSPADM

## 2021-04-09 RX ORDER — LANOLIN ALCOHOL/MO/W.PET/CERES
1 CREAM (GRAM) TOPICAL 2 TIMES DAILY WITH MEALS
Status: DISCONTINUED | OUTPATIENT
Start: 2021-04-10 | End: 2021-04-14 | Stop reason: HOSPADM

## 2021-04-09 RX ORDER — SENNOSIDES 8.6 MG/1
1 TABLET ORAL 2 TIMES DAILY
Status: DISCONTINUED | OUTPATIENT
Start: 2021-04-09 | End: 2021-04-14 | Stop reason: HOSPADM

## 2021-04-09 RX ORDER — KETAMINE HCL IN 0.9 % NACL 50 MG/5 ML
SYRINGE (ML) INTRAVENOUS AS NEEDED
Status: DISCONTINUED | OUTPATIENT
Start: 2021-04-09 | End: 2021-04-09 | Stop reason: HOSPADM

## 2021-04-09 RX ORDER — DEXTROSE MONOHYDRATE 100 MG/ML
125-250 INJECTION, SOLUTION INTRAVENOUS AS NEEDED
Status: DISCONTINUED | OUTPATIENT
Start: 2021-04-09 | End: 2021-04-09 | Stop reason: HOSPADM

## 2021-04-09 RX ORDER — LIDOCAINE HYDROCHLORIDE 20 MG/ML
INJECTION, SOLUTION EPIDURAL; INFILTRATION; INTRACAUDAL; PERINEURAL AS NEEDED
Status: DISCONTINUED | OUTPATIENT
Start: 2021-04-09 | End: 2021-04-09 | Stop reason: HOSPADM

## 2021-04-09 RX ORDER — FENTANYL CITRATE 50 UG/ML
INJECTION, SOLUTION INTRAMUSCULAR; INTRAVENOUS
Status: COMPLETED | OUTPATIENT
Start: 2021-04-09 | End: 2021-04-09

## 2021-04-09 RX ORDER — SODIUM CHLORIDE 0.9 % (FLUSH) 0.9 %
5-40 SYRINGE (ML) INJECTION AS NEEDED
Status: DISCONTINUED | OUTPATIENT
Start: 2021-04-09 | End: 2021-04-14 | Stop reason: HOSPADM

## 2021-04-09 RX ORDER — HYDROMORPHONE HYDROCHLORIDE 1 MG/ML
0.5 INJECTION, SOLUTION INTRAMUSCULAR; INTRAVENOUS; SUBCUTANEOUS
Status: DISCONTINUED | OUTPATIENT
Start: 2021-04-09 | End: 2021-04-09 | Stop reason: HOSPADM

## 2021-04-09 RX ORDER — EPHEDRINE SULFATE/0.9% NACL/PF 50 MG/5 ML
SYRINGE (ML) INTRAVENOUS AS NEEDED
Status: DISCONTINUED | OUTPATIENT
Start: 2021-04-09 | End: 2021-04-09 | Stop reason: HOSPADM

## 2021-04-09 RX ORDER — BUPIVACAINE HYDROCHLORIDE 5 MG/ML
INJECTION, SOLUTION EPIDURAL; INTRACAUDAL
Status: COMPLETED | OUTPATIENT
Start: 2021-04-09 | End: 2021-04-09

## 2021-04-09 RX ORDER — MAGNESIUM SULFATE 100 %
4 CRYSTALS MISCELLANEOUS AS NEEDED
Status: DISCONTINUED | OUTPATIENT
Start: 2021-04-09 | End: 2021-04-09 | Stop reason: HOSPADM

## 2021-04-09 RX ORDER — CEFAZOLIN SODIUM/WATER 2 G/20 ML
2 SYRINGE (ML) INTRAVENOUS ONCE
Status: COMPLETED | OUTPATIENT
Start: 2021-04-09 | End: 2021-04-09

## 2021-04-09 RX ORDER — CEFAZOLIN SODIUM/WATER 2 G/20 ML
2 SYRINGE (ML) INTRAVENOUS EVERY 8 HOURS
Status: COMPLETED | OUTPATIENT
Start: 2021-04-10 | End: 2021-04-10

## 2021-04-09 RX ORDER — PROMETHAZINE HYDROCHLORIDE 25 MG/1
25 TABLET ORAL
Status: DISCONTINUED | OUTPATIENT
Start: 2021-04-09 | End: 2021-04-14 | Stop reason: HOSPADM

## 2021-04-09 RX ORDER — ACETAMINOPHEN 325 MG/1
650 TABLET ORAL
Status: DISCONTINUED | OUTPATIENT
Start: 2021-04-09 | End: 2021-04-14 | Stop reason: HOSPADM

## 2021-04-09 RX ORDER — SODIUM CHLORIDE 0.9 % (FLUSH) 0.9 %
5-40 SYRINGE (ML) INJECTION EVERY 8 HOURS
Status: DISCONTINUED | OUTPATIENT
Start: 2021-04-09 | End: 2021-04-14 | Stop reason: HOSPADM

## 2021-04-09 RX ORDER — FAMOTIDINE 20 MG/1
20 TABLET, FILM COATED ORAL 2 TIMES DAILY
Status: DISCONTINUED | OUTPATIENT
Start: 2021-04-09 | End: 2021-04-12

## 2021-04-09 RX ORDER — SODIUM CHLORIDE, SODIUM LACTATE, POTASSIUM CHLORIDE, CALCIUM CHLORIDE 600; 310; 30; 20 MG/100ML; MG/100ML; MG/100ML; MG/100ML
INJECTION, SOLUTION INTRAVENOUS
Status: DISCONTINUED | OUTPATIENT
Start: 2021-04-09 | End: 2021-04-09 | Stop reason: HOSPADM

## 2021-04-09 RX ADMIN — IPRATROPIUM BROMIDE 0.5 MG: 0.5 SOLUTION RESPIRATORY (INHALATION) at 08:03

## 2021-04-09 RX ADMIN — SODIUM CHLORIDE, SODIUM LACTATE, POTASSIUM CHLORIDE, AND CALCIUM CHLORIDE 125 ML/HR: 600; 310; 30; 20 INJECTION, SOLUTION INTRAVENOUS at 18:00

## 2021-04-09 RX ADMIN — MIDAZOLAM 0.5 MG: 1 INJECTION INTRAMUSCULAR; INTRAVENOUS at 17:54

## 2021-04-09 RX ADMIN — HYDROCODONE BITARTRATE AND ACETAMINOPHEN 1 TABLET: 7.5; 325 TABLET ORAL at 22:23

## 2021-04-09 RX ADMIN — BUDESONIDE 500 MCG: 0.5 INHALANT RESPIRATORY (INHALATION) at 20:00

## 2021-04-09 RX ADMIN — Medication 10 MG: at 18:47

## 2021-04-09 RX ADMIN — SODIUM CHLORIDE, SODIUM LACTATE, POTASSIUM CHLORIDE, AND CALCIUM CHLORIDE: 600; 310; 30; 20 INJECTION, SOLUTION INTRAVENOUS at 17:51

## 2021-04-09 RX ADMIN — FAMOTIDINE 20 MG: 20 TABLET, FILM COATED ORAL at 21:58

## 2021-04-09 RX ADMIN — IPRATROPIUM BROMIDE AND ALBUTEROL SULFATE 3 ML: .5; 3 SOLUTION RESPIRATORY (INHALATION) at 04:20

## 2021-04-09 RX ADMIN — Medication 10 MG: at 18:49

## 2021-04-09 RX ADMIN — Medication 10 MG: at 17:56

## 2021-04-09 RX ADMIN — Medication 10 MG: at 18:03

## 2021-04-09 RX ADMIN — FAMOTIDINE 20 MG: 20 TABLET, FILM COATED ORAL at 08:57

## 2021-04-09 RX ADMIN — IPRATROPIUM BROMIDE 0.5 MG: 0.5 SOLUTION RESPIRATORY (INHALATION) at 11:29

## 2021-04-09 RX ADMIN — MIDAZOLAM 1.5 MG: 1 INJECTION INTRAMUSCULAR; INTRAVENOUS at 18:07

## 2021-04-09 RX ADMIN — BUPIVACAINE HYDROCHLORIDE 15 MG: 5 INJECTION, SOLUTION EPIDURAL; INTRACAUDAL; PERINEURAL at 18:20

## 2021-04-09 RX ADMIN — Medication 10 ML: at 13:28

## 2021-04-09 RX ADMIN — FUROSEMIDE 20 MG: 20 TABLET ORAL at 08:56

## 2021-04-09 RX ADMIN — Medication 10 ML: at 21:59

## 2021-04-09 RX ADMIN — SENNOSIDES 8.6 MG: 8.6 TABLET, FILM COATED ORAL at 21:58

## 2021-04-09 RX ADMIN — IPRATROPIUM BROMIDE 0.5 MG: 0.5 SOLUTION RESPIRATORY (INHALATION) at 15:31

## 2021-04-09 RX ADMIN — ARFORMOTEROL TARTRATE 15 MCG: 15 SOLUTION RESPIRATORY (INHALATION) at 07:17

## 2021-04-09 RX ADMIN — CEFAZOLIN 2 G: 1 INJECTION, POWDER, FOR SOLUTION INTRAVENOUS at 18:23

## 2021-04-09 RX ADMIN — Medication 10 MG: at 18:51

## 2021-04-09 RX ADMIN — Medication 10 MG: at 17:54

## 2021-04-09 RX ADMIN — BUDESONIDE 500 MCG: 0.5 INHALANT RESPIRATORY (INHALATION) at 07:17

## 2021-04-09 RX ADMIN — FENTANYL CITRATE 25 MCG: 50 INJECTION, SOLUTION INTRAMUSCULAR; INTRAVENOUS at 18:20

## 2021-04-09 RX ADMIN — Medication 10 MG: at 18:06

## 2021-04-09 RX ADMIN — SODIUM CHLORIDE 75 ML/HR: 900 INJECTION, SOLUTION INTRAVENOUS at 21:57

## 2021-04-09 RX ADMIN — LIDOCAINE HYDROCHLORIDE 100 MG: 20 INJECTION, SOLUTION EPIDURAL; INFILTRATION; INTRACAUDAL; PERINEURAL at 18:24

## 2021-04-09 RX ADMIN — Medication 10 MG: at 18:41

## 2021-04-09 RX ADMIN — PROPOFOL 30 MCG/KG/MIN: 10 INJECTION, EMULSION INTRAVENOUS at 18:25

## 2021-04-09 RX ADMIN — Medication 10 ML: at 21:58

## 2021-04-09 RX ADMIN — SODIUM CHLORIDE, SODIUM LACTATE, POTASSIUM CHLORIDE, AND CALCIUM CHLORIDE: 600; 310; 30; 20 INJECTION, SOLUTION INTRAVENOUS at 19:23

## 2021-04-09 RX ADMIN — AMLODIPINE BESYLATE 5 MG: 5 TABLET ORAL at 08:57

## 2021-04-09 RX ADMIN — Medication 10 MG: at 18:08

## 2021-04-09 RX ADMIN — METHYLPREDNISOLONE SODIUM SUCCINATE 30 MG: 125 INJECTION, POWDER, FOR SOLUTION INTRAMUSCULAR; INTRAVENOUS at 08:57

## 2021-04-09 RX ADMIN — METHYLPREDNISOLONE SODIUM SUCCINATE 20 MG: 40 INJECTION, POWDER, FOR SOLUTION INTRAMUSCULAR; INTRAVENOUS at 21:58

## 2021-04-09 NOTE — ROUTINE PROCESS
Bedside and Verbal shift change report given to Bethanie Campos RN (oncoming nurse) by Deshawn White RN (offgoing nurse). Report included the following information SBAR, Kardex, Intake/Output, MAR, Recent Results, and Cardiac Rhythm NSR.

## 2021-04-09 NOTE — PROGRESS NOTES
OT orders received, pt chart reviewed. Note plan for surgical intervention for L hip, pt on surgical schedule for later this afternoon.  Please re-order Occupational Therapy services post-op when appropriate.     Thank you for this referral.  Anuj Chao OTR/GLENN

## 2021-04-09 NOTE — CONSULTS
Palliative Medicine Consult    Patient Name: Smith Pineda  YOB: 1945    Date of Initial Consult: April 7, 2021, April 8, 2021, April 9, 2021  Reason for Consult: Goals of care discussions  Requesting Provider: Dr. Bear Hong  Primary Care Physician: Margie Brooks MD      SUMMARY:   Smith Pineda is a 66 y.o. with a past history of PAF, COPD, asbetosis, chronic respiratory failure with hypoxia 2 L, who was admitted on 4/5/2021 from home with a diagnosis of COPD exacerbation, hyponatremia, hypokalemia, and left hip pain. Current medical issues leading to Palliative Medicine involvement include: Support and goals of care discussions. April 8, 2021:  Patient is awake, alert, sitting up on edge of bed, less tearful today. April 9, 2021:  Patient is awake, alert, lying in bed, in good spirits waiting for surgery today. He hopes it sooner rather than later as he has quite the appetite and wants to eat and drink. PALLIATIVE DIAGNOSES:   1. Goals of care discussions  2. Acute on chronic COPD  3. Left hip pain  4. Advanced age/debility       PLAN:   April 9, 2021: Palpation of patient's bedside, patient is awake, alert, lying in bed, in good spirits waiting for surgery today. He hopes it sooner rather than later as he has quite the appetite and wants to eat and drink. He feels better about the surgery knowing that his presurgical clearance was completed. Supportive visit today. No changes in goals of care; patient is a Partial code: DNR in the event of cardiopulmonary arrest, okay with intubation prearrest (2-week trial, no tracheostomy), no feeding tubes. We will continue to follow for support. See previous discussions below    April 8, 2021: Palliative medicine team including Chino Rogers RN and I met with patient at patient's bedside. Patient is awake, alert, sitting up on edge of bed, less tearful today.   He is a little bit more positive about the surgery, and looking forward to the preop testing. He states \"I am hoping for the best.\"  No family at bedside, but states that a daughter will come in to see him today. No changes in goals of care; patient is a Partial code: DNR in the event of cardiopulmonary arrest, okay with intubation prearrest (2-week trial, no tracheostomy), no feeding tubes. We will continue to follow for support. April 7, 2021: Goals of care discussions: Palliative medicine team including Chino Rogers RN and I met with patient and patient's bedside. Patient is awake, alert, sitting up on the edge of the bed and crying. Support offered as patient shares he is a little sad and anxious about needing left hip surgery because he knows he is older in age and a high risk surgical candidate due to his chronic comorbidities. He states he is not afraid to die, but he is sad of the idea of leaving his family behind. Empathetic listening provided. Patient states he is willing to go through with a hip surgery, because as of right now he cannot walk due to excruciating pain to the left hip upon ambulation. He states he would rather take the risk of surgery then to live the rest of his life wheelchair-bound. Support offered. Palliative team met with patient yesterday and reviewed current AMD and POST form which remains his current wishes. Patient is a Partial code: DNR in the event of cardiopulmonary arrest, okay with intubation prearrest (2-week trial, no tracheostomy), no feeding tubes. We will continue to follow for support. 1. Acute on chronic COPD: COPD exacerbation. Weaning steroids, mildly improving, known history of asbestosis and chronic respiratory failure with hypoxia, he is on home O2 at 2 L. Able to talk in complete sentences and breathing comfortably even when crying. 2. Left hip pain: Ortho consulted. Per patient, he needs hip surgery because \"the hip is gone\". 3. Advanced age/debility: 13-year-old male who lives with his wife, Nadeem Ortiz.   He has five children. He lives in a single floor home and usually independent with ADLs but does use assistive devices including a cane and a walker for ambulatory assist.  Has not been able to drive recently due to shortness of breath and debility. 4. Initial consult note routed to primary continuity provider  5. Communicated plan of care with: Palliative IDT       GOALS OF CARE / TREATMENT PREFERENCES:   [====Goals of Care====]  GOALS OF CARE: Partial code: DNR in the event of cardiopulmonary arrest, okay with intubation pre-arrest (2-week trial, no tracheostomy), no feeding tubes  Patient/Health Care Proxy Stated Goals: Prolong life      TREATMENT PREFERENCES:   Code Status: Partial Code    Advance Care Planning:  Advance Care Planning 4/5/2021   Patient's Healthcare Decision Maker is: -   Primary Decision Maker Name -   Primary Decision Maker Phone Number -   Primary Decision Maker Relationship to Patient -   Confirm Advance Directive Yes, on file   Patient Would Like to Complete Advance Directive -   Does the patient have other document types -       Medical Interventions: Full interventions           The palliative care team has discussed with patient / health care proxy about goals of care / treatment preferences for patient.  [====Goals of Care====]         HISTORY:     History obtained from: Patient, chart    CHIEF COMPLAINT: Anxious about the potential need for surgery for his left hip, because he knows he is a high risk surgical candidate but he is willing to do the surgery so that he can be ambulatory again.     HPI/SUBJECTIVE:    The patient is:   [x] Verbal and participatory  [] Non-participatory due to:   Oriented x4    Clinical Pain Assessment (nonverbal scale for severity on nonverbal patients):   Clinical Pain Assessment  Severity: 2            FUNCTIONAL ASSESSMENT:     Palliative Performance Scale (PPS):  PPS: 50       PSYCHOSOCIAL/SPIRITUAL SCREENING:     Advance Care Planning:  Advance Care Planning 4/5/2021   Patient's Healthcare Decision Maker is: -   Primary Decision Maker Name -   Primary Decision Maker Phone Number -   Primary Decision Maker Relationship to Patient -   Confirm Advance Directive Yes, on file   Patient Would Like to Complete Advance Directive -   Does the patient have other document types -        Any spiritual / Orthodoxy concerns:  [] Yes /  [x] No    Caregiver Burnout:  [] Yes /  [] No /  [x] No Caregiver Present      Anticipatory grief assessment:   [x] Normal  / [] Maladaptive              REVIEW OF SYSTEMS:     Positive and pertinent negative findings in ROS are noted above in HPI. The following systems were [x] reviewed / [] unable to be reviewed as noted in HPI  Other findings are noted below. Systems: constitutional, ears/nose/mouth/throat, respiratory, gastrointestinal, genitourinary, musculoskeletal, integumentary, neurologic, psychiatric, endocrine. Positive findings noted below. Modified ESAS Completed by: provider   Fatigue: 4       Pain: 2   Anxiety: 0 Nausea: 0     Dyspnea: 0     Constipation: No     Stool Occurrence(s): 1        PHYSICAL EXAM:     From RN flowsheet:  Wt Readings from Last 3 Encounters:   04/09/21 98.6 kg (217 lb 4.8 oz)   04/06/21 104.8 kg (231 lb 0.7 oz)   03/01/21 94.2 kg (207 lb 10.8 oz)     Blood pressure 138/70, pulse 85, temperature 97.3 °F (36.3 °C), resp. rate 22, height 5' 7\" (1.702 m), weight 98.6 kg (217 lb 4.8 oz), SpO2 98 %.     Pain Scale 1: Numeric (0 - 10)  Pain Intensity 1: 0     Pain Location 1: Head  Pain Orientation 1: Left  Pain Description 1: Aching  Pain Intervention(s) 1: Medication (see MAR)      Constitutional: Awake, alert, lying in bed resting, in no acute distress  Eyes: pupils equal, anicteric  ENMT: no nasal discharge, moist mucous membranes  Cardiovascular: regular rhythm  Respiratory: breathing not labored, symmetric  Musculoskeletal: no deformity  Skin: warm, dry  Neurologic: following commands, moving all extremities, oriented x4  Psychiatric: full affect, no hallucinations       HISTORY:     Principal Problem:    Acute exacerbation of chronic obstructive pulmonary disease (COPD) ([facility] [address] (2/8/2019)    Active Problems:    Hypertension ()      Asbestosis ([facility] [address] (10/19/2018)      Debility (7/8/2019)      Hyponatremia (4/10/2020)      Hypokalemia (4/14/2020)      Left hip pain (4/5/2021)      PAF (paroxysmal atrial fibrillation) ([facility] [address] (4/5/2021)      Avascular necrosis of bone of left hip ([facility] [address] (4/8/2021)      Past Medical History:   Diagnosis Date    Brain aneurysm     Cancer ([facility] [address]     prostate    COPD (chronic obstructive pulmonary disease) ([facility] [address]     Hypertension     Nocturia     Pneumonia     Radiation effect       Past Surgical History:   Procedure Laterality Date    HX HERNIA REPAIR        Family History   Problem Relation Age of Onset    Heart Disease Mother       History reviewed, no pertinent family history.   Social History     Tobacco Use    Smoking status: Former Smoker     Types: Cigarettes    Smokeless tobacco: Never Used   Substance Use Topics    Alcohol use: No     Allergies   Allergen Reactions    Aspirin Other (comments)     \"messes my stomach up\"      Current Facility-Administered Medications   Medication Dose Route Frequency    famotidine (PEPCID) tablet 20 mg  20 mg Oral BID    methylPREDNISolone (PF) (Solu-MEDROL) injection 30 mg  30 mg IntraVENous Q12H    sodium chloride (OCEAN) 0.65 % nasal squeeze bottle 2 Spray  2 Spray Both Nostrils Q2H PRN    ipratropium (ATROVENT) 0.02 % nebulizer solution 0.5 mg  0.5 mg Nebulization QID RT    amLODIPine (NORVASC) tablet 5 mg  5 mg Oral DAILY    tamsulosin (FLOMAX) capsule 0.4 mg  0.4 mg Oral QPM    dilTIAZem IR (CARDIZEM) tablet 30 mg  30 mg Oral TIDAC    furosemide (LASIX) tablet 20 mg  20 mg Oral DAILY    azithromycin (ZITHROMAX) tablet 500 mg  500 mg Oral Q24H    cefTRIAXone (ROCEPHIN) 1 g in sterile water (preservative free) 10 mL IV syringe  1 g IntraVENous Q24H    sodium chloride (NS) flush 5-40 mL  5-40 mL IntraVENous Q8H    sodium chloride (NS) flush 5-40 mL  5-40 mL IntraVENous PRN    acetaminophen (TYLENOL) tablet 650 mg  650 mg Oral Q6H PRN    Or    acetaminophen (TYLENOL) suppository 650 mg  650 mg Rectal Q6H PRN    bisacodyL (DULCOLAX) suppository 10 mg  10 mg Rectal DAILY PRN    promethazine (PHENERGAN) tablet 12.5 mg  12.5 mg Oral Q6H PRN    Or    ondansetron (ZOFRAN) injection 4 mg  4 mg IntraVENous Q6H PRN    enoxaparin (LOVENOX) injection 40 mg  40 mg SubCUTAneous DAILY    budesonide (PULMICORT) 500 mcg/2 ml nebulizer suspension  500 mcg Nebulization BID RT    arformoteroL (BROVANA) neb solution 15 mcg  15 mcg Nebulization BID RT    albuterol-ipratropium (DUO-NEB) 2.5 MG-0.5 MG/3 ML  3 mL Nebulization Q4H PRN          LAB AND IMAGING FINDINGS:     Lab Results   Component Value Date/Time    WBC 8.6 04/09/2021 01:00 AM    HGB 9.6 (L) 04/09/2021 01:00 AM    PLATELET 748 27/14/5481 01:00 AM     Lab Results   Component Value Date/Time    Sodium 134 (L) 04/09/2021 01:00 AM    Potassium 3.5 04/09/2021 01:00 AM    Chloride 96 (L) 04/09/2021 01:00 AM    CO2 30 04/09/2021 01:00 AM    BUN 38 (H) 04/09/2021 01:00 AM    Creatinine 1.21 04/09/2021 01:00 AM    Calcium 7.9 (L) 04/09/2021 01:00 AM    Magnesium 1.9 04/05/2021 01:30 PM    Phosphorus 2.5 04/09/2021 01:00 AM      Lab Results   Component Value Date/Time    Alk.  phosphatase 112 02/28/2021 04:45 AM    Protein, total 6.1 (L) 02/28/2021 04:45 AM    Albumin 2.9 (L) 04/09/2021 01:00 AM    Globulin 3.4 02/28/2021 04:45 AM     Lab Results   Component Value Date/Time    INR 0.9 07/09/2019 05:20 AM    Prothrombin time 12.3 07/09/2019 05:20 AM    aPTT 110.0 (H) 11/01/2019 04:40 AM      No results found for: IRON, FE, TIBC, IBCT, PSAT, FERR   No results found for: PH, PCO2, PO2  No components found for: [place]   Lab Results   Component Value Date/Time     04/05/2021 01:30 PM    CK - MB 4.6 (H) 04/05/2021 01:30 PM                Total time: 15 minutes  Counseling / coordination time, spent as noted above: 10 minutes  > 50% counseling / coordination?:  Yes, patient    Prolonged service was provided for  []30 min   []75 min in face to face time in the presence of the patient, spent as noted above. Time Start:   Time End:   Note: this can only be billed with 18525 (initial) or 64234 (follow up). If multiple start / stop times, list each separately.

## 2021-04-09 NOTE — PROGRESS NOTES
4/9/2021 PT note: consult received and chart reviewed. Per chart pt planning to undergo L hip surgery pending pulmonary/cardiac clearance. Continuing to hold PT evaluation until after surgical intervention. Please re-consult Physical Therapy as appropriate, including Wt Bearing status. Thank you.   Theo Hall, PT

## 2021-04-09 NOTE — PERIOP NOTES
2 Brooklynn Moe made aware that SBAR is ready for review. Patient assigned room #341.  Chris La will be the nurse

## 2021-04-09 NOTE — PROGRESS NOTES
Nephrology Inpatient Progress Note    Subjective:     Flaquita Lira is a 66 y.o.  male with a PMH of HTN, COPD on home O2, P Afib, hx Covid 2/26 then s/p J&J Covid vax 3 weeks ago now presenting with SOB. Pt c/o swelling, arthirtis- no N/V, diarrhea, CP, Denies fever. Na here 126 on 4/5/21. Back on 3/3/21, baseline Na was 139. Na up to 131 then 134 today and Cr unchanged at 1.2.  UO over 1.5 L yesterday. Pending ortho for left hip pain.     Admit Date: 4/5/2021  Patient Active Problem List   Diagnosis Code    Hypertension I10    COPD (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 ([facility]) J44.9    Chronic renal disease, stage 3, moderately decreased glomerular filtration rate between 30-59 mL/min/1.73 square meter ([facility]) N18.30    Asbestosis ([facility] [address] J61    Acute exacerbation of chronic obstructive pulmonary disease (COPD) ([facility] [address] J44.1    Debility R53.81    Paroxysmal atrial fibrillation ([facility]) I48.0    Chronic respiratory failure with hypoxia ([facility]) J96.11    Tobacco dependence F17.200    Hyponatremia E87.1    Pleural effusion, right J90    COPD with acute exacerbation ([facility] [address] J44.1    Acute respiratory failure with hypoxia and hypercarbia ([facility]) J96.01, J96.02    Hyponatremia E87.1    Advanced care planning/counseling discussion Z71.89    Hypokalemia E87.6    COPD (chronic obstructive pulmonary disease) ([facility]) J44.9    CAP (community acquired pneumonia) J18.9    Respiratory distress R06.03    COPD exacerbation ([facility]) J44.1    Atrial fibrillation with RVR ([facility]) I48.91    Acute respiratory failure ([facility]) J96.00    Left hip pain M25.552    PAF (paroxysmal atrial fibrillation) ([facility]) I48.0    Avascular necrosis of bone of left hip ([facility]) M87.052     Current Facility-Administered Medications   Medication Dose Route Frequency    methylPREDNISolone (PF) (Solu-MEDROL) injection 30 mg  30 mg IntraVENous Q12H    sodium chloride (OCEAN) 0.65 % nasal squeeze bottle 2 Spray  2 Spray Both Nostrils Q2H PRN    famotidine (PEPCID) tablet 20 mg  20 mg Oral DAILY    ipratropium (ATROVENT) 0.02 % nebulizer solution 0.5 mg  0.5 mg Nebulization QID RT    amLODIPine (NORVASC) tablet 5 mg  5 mg Oral DAILY    tamsulosin (FLOMAX) capsule 0.4 mg  0.4 mg Oral QPM    dilTIAZem IR (CARDIZEM) tablet 30 mg  30 mg Oral TIDAC    furosemide (LASIX) tablet 20 mg  20 mg Oral DAILY    azithromycin (ZITHROMAX) tablet 500 mg  500 mg Oral Q24H    cefTRIAXone (ROCEPHIN) 1 g in sterile water (preservative free) 10 mL IV syringe  1 g IntraVENous Q24H    sodium chloride (NS) flush 5-40 mL  5-40 mL IntraVENous Q8H    sodium chloride (NS) flush 5-40 mL  5-40 mL IntraVENous PRN    acetaminophen (TYLENOL) tablet 650 mg  650 mg Oral Q6H PRN    Or    acetaminophen (TYLENOL) suppository 650 mg  650 mg Rectal Q6H PRN    bisacodyL (DULCOLAX) suppository 10 mg  10 mg Rectal DAILY PRN    promethazine (PHENERGAN) tablet 12.5 mg  12.5 mg Oral Q6H PRN    Or    ondansetron (ZOFRAN) injection 4 mg  4 mg IntraVENous Q6H PRN    enoxaparin (LOVENOX) injection 40 mg  40 mg SubCUTAneous DAILY    budesonide (PULMICORT) 500 mcg/2 ml nebulizer suspension  500 mcg Nebulization BID RT    arformoteroL (BROVANA) neb solution 15 mcg  15 mcg Nebulization BID RT    albuterol-ipratropium (DUO-NEB) 2.5 MG-0.5 MG/3 ML  3 mL Nebulization Q4H PRN       Allergy:   Allergies   Allergen Reactions    Aspirin Other (comments)     \"messes my stomach up\"        Objective:     Visit Vitals  /68   Pulse 85   Temp 97.5 °F (36.4 °C)   Resp 22   Ht 5' 7\" (1.702 m)   Wt 98.6 kg (217 lb 4.8 oz)   SpO2 99%   BMI 34.03 kg/m²       Intake/Output Summary (Last 24 hours) at 4/9/2021 0749  Last data filed at 4/9/2021 0414  Gross per 24 hour   Intake 120 ml   Output 1575 ml   Net -1455 ml       Physical Exam:       General: No acute distress   HENT: Atraumatic and normocephalic   Eyes: Normal conjunctiva   Neck: Supple with no JVD   Cardiovascular: Normal S1 & S2, no m/r/g   Pulmonary/Chest Wall: Clear to auscultation bilaterally, no w/c   Abdominal: Soft and non-tender   Musculoskeletal: + edema   Neurological: No focal deficits       Data Review:  Recent Labs     04/09/21 0100 04/08/21  0240   * 131*   K 3.5 3.5   CL 96* 93*   CO2 30 30   BUN 38* 41*   CREA 1.21 1.27   * 145*   PHOS 2.5 3.3   CA 7.9* 7.9*     Recent Labs     04/09/21 0100 04/08/21  0240   WBC 8.6 12.1   HGB 9.6* 9.7*   HCT 29.3* 27.9*    333     Recent Labs     04/09/21 0100 04/08/21  0240   ALB 2.9* 2.9*     No results for input(s): INR, PTP, APTT, INREXT, INREXT in the last 72 hours. No results for input(s): IRON, FE, TIBC, IBCT, PSAT, FERR in the last 72 hours. Most recent labs: reviewed. Impression:     Hyponatremia, hypervolemic, acute. Na up to 131 llhg482 now, with baseline Na 139. Veronica 29. Has normal TSH. COPD, stable. Neg rapid COVID test  Hypokalemia, mild, K up to 3.5  CKD stage 3b, baseline 1.2 to 1.5 in recent months, Cr 1.2 now  HTN  Paroxysmal Afib  Arthiritis with left hip pain, AVN  Anemia    Plan:     Cardiazem for afib  Cont lasix 20mg po daily.     Strict I/Os  Following serum Na in AM  Avoid NSAIDs   Dobutamine stress echo per cardiology  Ortho to eval left hip AVN    MD Eric Tinoco  [phone]

## 2021-04-09 NOTE — PROGRESS NOTES
Pulmonary and Sleep Medicine     Pulmonary Note    Patient: Sanya Simms               Sex: male          DOA: 4/5/2021       YOB: 1945      Age:  66 y.o.        LOS:  LOS: 4 days        Reason for Consult: COPD exacerbation, pre-op pulmonary clearance    IMPRESSION:   Patient Active Problem List   Diagnosis Code    Hypertension I10    COPD (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 ([facility]) J44.9    Chronic renal disease, stage 3, moderately decreased glomerular filtration rate between 30-59 mL/min/1.73 square meter ([facility]) N18.30    Asbestosis ([facility] J61    Acute exacerbation of chronic obstructive pulmonary disease (COPD) ([facility] J44.1    Debility R53.81    Paroxysmal atrial fibrillation ([facility]) I48.0    Chronic respiratory failure with hypoxia ([facility]) J96.11    Tobacco dependence F17.200    Hyponatremia E87.1    Pleural effusion, right J90    COPD with acute exacerbation ([facility] J44.1    Acute respiratory failure with hypoxia and hypercarbia ([facility]) J96.01, J96.02    Hyponatremia E87.1    Advanced care planning/counseling discussion Z71.89    Hypokalemia E87.6    COPD (chronic obstructive pulmonary disease) ([facility]) J44.9    CAP (community acquired pneumonia) J18.9    Respiratory distress R06.03    COPD exacerbation ([facility]) J44.1    Atrial fibrillation with RVR ([facility]) I48.91    Acute respiratory failure ([facility]) J96.00    Left hip pain M25.552    PAF (paroxysmal atrial fibrillation) ([facility]) I48.0    Avascular necrosis of bone of left hip ([facility]) M87.052 ·   Pre-op pulmonary exam- Z01.811   RECOMMENDATIONS:   Overall feels improvement in dyspnea, wheezing since admitted   Supplemental O2 via NC @ 2 Lpm; on home O2 at 2 Lpm, monitor for goal SPO2> 91%  Continue steroid and taper to 20 mg Q12  Continue Atrovent QID and continue budesonide, Brovana nebs  Pulmonary hygiene care, IS when awake  Antibiotic choice: Rocephin, Azithromycin finish 5 days course today    XR chest 4/5/21- nil acute; no pneumonia  ABG from 4/5/21 reviewed with chronic hypoxia  Pt previously has been on ventilator - last episode 02/21 - extubated after 3 day on ventilator. Previously used BiPAP for COPD exacerbation  He takes Stiolto and Pulmicort neb for COPD at home  Former smoker, quit about 1.5 yrs ago  PFT 6/29/20 in our office with his regular pulmonologist showed - FEV1 54% [GOLD CLASS 2], FEV1% 58, %, %, DLCO 67% - moderate COPD with hyperinflation and reduced DLCO    COPD exacerbation appears to be mild and clinically improving - \"I am almost close to my baseline with my breathing than I came. \"  Patient is moderately high risk for post-op pulmonary complication   He may need urgent surgery as patients hip pain limiting his active lifestyle which may adversely affect his COPD as well  His risk cannot be further diminished. Be alert for perioperative complications such as aspiration or nosocomial pneumonia, respiratory failure, prolonged mechanical ventilation, atelectasis, wound healing on steroids. Surgical decision should be based on acceptance of such risks and potential benefits of surgery between orthopedics and the patient. \"I want to take my chances. I can't live my life in wheelchair. \"  He would need rachid-operative O2 supplement, bronchodilators, DVT prophylaxis per surgery, judicious tmivokbc-[id number]-[id number] mediation/s, aggressive pulmonary toilet, airways monitoring with aspiration precautions and HOB30'. Early mobilization, regular use of incentive spirometry should be goals- patient agrees. If option of regional anesthesia available over [place] then should be considered  If unable to be extubated post-op, then monitored in ICU post-op and planned extubation could be done. Our pulmonary group would be happy to assist as needed post operatively.      Aspiration prevention bundle, head of the bed at 30' all times  Glycemic control as needed while on steroids  Stress ulcer and DVT prophylaxis    Per discussion with Dr. Adán Youngblood, cardiology work up didn't show need for any interventions  Pt is possibly going to have regional anesthesia for surgery   I spoke with wife Gilmar Marques at her listed number [phone] on chart 4/7/21 and discussed above. She feels pt in distress from hip pain affecting his quality of life and would support 's decision  PT and OT  Palliative care consult following. Partial DNR noted. Will defer respective systems problem management to primary and other consultant and follow patient with primary and other team  Further recommendations will be based on the patient's response to recommended treatment and results of the investigation ordered. HPI:   Mr. Ed Gray is a 66 y.o. male who is seen at the request of Dr. González Caal for COPD exacerbation, pre-op respiratory exam. He is know to my associate Dr. Soila Whittington and follows with him for COPD. He presented with PMHx of PAF, COPD, asbestos pleural plaque, chronic respiratory failure with hypoxia 2 L O2 at home came to ER due to hip pain and SOB worsening in 1-2 weeks PTA, associated with cough, wheezing. He received iv steroid and breathing tx in ER, while x-ray of chest no acute process. ABG on room air showed hypoxia. He has remained on steroid and bronchodilator therapy. Reports feeling improving close to baseline. During this hospitalization w/up for LT hip pain showed avascular necrosis and orthopedics surgery is consulted for their opinion about THR. The patient denies fever, chills, chest pain, hemoptysis, sore throat, sepsis recently. He feels cough is now intermittent, improved wheezing and improving dyspnea. 04/09/21   Pt seen at bedside in # [phone]  \"I am almost close to my baseline with my breathing than I came. \"  Had intermittent wheezing improved with neb treatments  Sitting up at the edge of bed  Ambulates in  with 2 Lpm O2  Denies fever, chills, cough, chest pain, phlegm, hemoptysis  Pulmonary was not contacted by staff on anything about patient overnight.        Review of Systems   General ROS: negative for - fever, chills, weight loss, fatigue and malaise  Cardiovascular ROS: negative for - chest pain, edema, murmur, orthopnea, palpitations or pnd  Gastrointestinal ROS: no nausea, vomiting, abdominal pain, change in bowel habits, or black or bloody stools  Genito-Urinary ROS: no dysuria, trouble voiding, or hematuria  Dermatological ROS: negative for - pruritus, rash or skin lesion changes       Past Medical History  Past Medical History:   Diagnosis Date    Brain aneurysm     Cancer ([facility] [address]     prostate    [facility]     Hypertension     Nocturia     Pneumonia     Radiation effect        Past Surgical History  Past Surgical History:   Procedure Laterality Date    HX HERNIA REPAIR         Family History  Family History   Problem Relation Age of Onset    Heart Disease Mother        Social History  Social History     Tobacco Use    Smoking status: Former Smoker     Types: Cigarettes    Smokeless tobacco: Never Used   Substance Use Topics    Alcohol use: No    Drug use: Never        Medications  Current Facility-Administered Medications   Medication Dose Route Frequency    famotidine (PEPCID) tablet 20 mg  20 mg Oral BID    methylPREDNISolone (PF) (Solu-MEDROL) injection 30 mg  30 mg IntraVENous Q12H    sodium chloride (OCEAN) 0.65 % nasal squeeze bottle 2 Spray  2 Spray Both Nostrils Q2H PRN    ipratropium (ATROVENT) 0.02 % nebulizer solution 0.5 mg  0.5 mg Nebulization QID RT    amLODIPine (NORVASC) tablet 5 mg  5 mg Oral DAILY    tamsulosin (FLOMAX) capsule 0.4 mg  0.4 mg Oral QPM    dilTIAZem IR (CARDIZEM) tablet 30 mg  30 mg Oral TIDAC    furosemide (LASIX) tablet 20 mg  20 mg Oral DAILY    azithromycin (ZITHROMAX) tablet 500 mg  500 mg Oral Q24H    cefTRIAXone (ROCEPHIN) 1 g in sterile water (preservative free) 10 mL IV syringe  1 g IntraVENous Q24H    sodium chloride (NS) flush 5-40 mL  5-40 mL IntraVENous Q8H    sodium chloride (NS) flush 5-40 mL  5-40 mL IntraVENous PRN    acetaminophen (TYLENOL) tablet 650 mg  650 mg Oral Q6H PRN    Or    acetaminophen (TYLENOL) suppository 650 mg  650 mg Rectal Q6H PRN    bisacodyL (DULCOLAX) suppository 10 mg  10 mg Rectal DAILY PRN    promethazine (PHENERGAN) tablet 12.5 mg  12.5 mg Oral Q6H PRN    Or    ondansetron (ZOFRAN) injection 4 mg  4 mg IntraVENous Q6H PRN    enoxaparin (LOVENOX) injection 40 mg  40 mg SubCUTAneous DAILY    budesonide (PULMICORT) 500 mcg/2 ml nebulizer suspension  500 mcg Nebulization BID RT    arformoteroL (BROVANA) neb solution 15 mcg  15 mcg Nebulization BID RT    albuterol-ipratropium (DUO-NEB) 2.5 MG-0.5 MG/3 ML  3 mL Nebulization Q4H PRN     Prior to Admission medications    Medication Sig Start Date End Date Taking? Authorizing Provider   predniSONE (STERAPRED DS) 10 mg dose pack Take 6 tablets p.o. daily x1 day, take 5 tablets p.o. daily x1 day, take 4 tablets p.o. daily x1 day, take 3 tabs p.o. daily x1 day, take 2 tablets p.o. daily x1 day, and take 1 tablet p.o. daily x1 day. 3/2/21   Lindsay Zapata MD   amLODIPine (NORVASC) 5 mg tablet Take 1 Tab by mouth daily. 3/3/21   Lindsay Zapata MD   albuterol-ipratropium (DUO-NEB) 2.5 mg-0.5 mg/3 ml nebu 3 mL by Nebulization route every four (4) hours as needed for Wheezing. 2/7/21   Viviana Jordan MD   albuterol (PROVENTIL HFA, VENTOLIN HFA, PROAIR HFA) 90 mcg/actuation inhaler Take 2 Puffs by inhalation every four (4) hours as needed for Wheezing or Shortness of Breath. 2/7/21   Viviana Jordan MD   OXYGEN-AIR DELIVERY SYSTEMS 2 L/min by Nasal route continuous. Provider, Historical   furosemide (Lasix) 20 mg tablet Take 20 mg by mouth daily. Provider, Historical   dilTIAZem (CARDIZEM) 30 mg tablet Take 1 Tab by mouth Before breakfast, lunch, and dinner.   Patient taking differently: Take 30 mg by mouth daily. Daily 20   Tamela Sanchez MD   acetaminophen (TYLENOL) 325 mg tablet Take 650 mg by mouth every eight (8) hours as needed for Pain. Provider, Historical   dextran 70/hypromellose (ARTIFICIAL TEARS, PF, OP) Apply 2 Drops to eye as needed for Other (both eyes for dryness). Provider, Historical   diphenhydrAMINE (BENADRYL) 25 mg capsule Take 25 mg by mouth nightly as needed for Itching. Provider, Historical   tamsulosin (FLOMAX) 0.4 mg capsule Take 0.4 mg by mouth every evening. Other, MD Blayne       Allergy  Allergies   Allergen Reactions    Aspirin Other (comments)     \"messes my stomach up\"       Physical Exam:   Vital Signs:    Blood pressure (!) 140/68, pulse 80, temperature 97.8 °F (36.6 °C), resp. rate 24, height 5' 7\" (1.702 m), weight 98.6 kg (217 lb 4.8 oz), SpO2 98 %. Body mass index is 34.03 kg/m².     O2 Device: Nasal cannula   O2 Flow Rate (L/min): 2 l/min   Temp (24hrs), Av.7 °F (36.5 °C), Min:97.3 °F (36.3 °C), Max:98 °F (36.7 °C)       Intake/Output:   Last shift:      701 - 1900  In: -   Out: 575 [Urine:575]  Last 3 shifts: 1901 - 700  In: 36 [P.O.:820]  Out: 2325 [Urine:2325]    Intake/Output Summary (Last 24 hours) at 2021 1252  Last data filed at 2021 0744  Gross per 24 hour   Intake 120 ml   Output 2150 ml   Net -2030 ml        Physical Exam:   General: in no respiratory distress and acyanotic, alert and oriented times 3, cooperative, appears stated age, on O2 via NC  HEENT: PERRLA, EOMI, fundi benign, throat normal without erythema or exudate  Neck: No abnormally enlarged lymph nodes or thyroid, supple  Chest: increased AP diameter   Lungs: moderate air entry, clear to auscultation bilaterally, normal percussion bilaterally, no tenderness/ rash  Heart: Regular rate and rhythm, S1S2 present or without murmur or extra heart sounds  Abdomen: obese, bowel sounds normoactive, tympanic, abdomen is soft without significant tenderness, masses, organomegaly or guarding  Extremity: 1+, pitting and bl LE edema; no cyanosis, clubbing  Neuro: alert, oriented x 3, no defects noted in general exam.  Skin: Skin color, texture, turgor fair       Labs Reviewed:  Recent Results (from the past 24 hour(s))   CBC WITH AUTOMATED DIFF    Collection Time: 04/09/21  1:00 AM   Result Value Ref Range    WBC 8.6 4.6 - 13.2 K/uL    RBC 3.39 (L) 4.35 - 5.65 M/uL    HGB 9.6 (L) 13.0 - 16.0 g/dL    HCT 29.3 (L) 36.0 - 48.0 %    MCV 86.4 74.0 - 97.0 FL    MCH 28.3 24.0 - 34.0 PG    MCHC 32.8 31.0 - 37.0 g/dL    RDW 13.4 11.6 - 14.5 %    PLATELET 538 982 - 700 K/uL    MPV 8.5 (L) 9.2 - 11.8 FL    NEUTROPHILS 86 (H) 40 - 73 %    LYMPHOCYTES 5 (L) 21 - 52 %    MONOCYTES 5 3 - 10 %    EOSINOPHILS 0 0 - 5 %    BASOPHILS 0 0 - 2 %    ABS. NEUTROPHILS 7.4 1.8 - 8.0 K/UL    ABS. LYMPHOCYTES 0.4 (L) 0.9 - 3.6 K/UL    ABS. MONOCYTES 0.4 0.05 - 1.2 K/UL    ABS. EOSINOPHILS 0.0 0.0 - 0.4 K/UL    ABS.  BASOPHILS 0.0 0.0 - 0.1 K/UL    DF AUTOMATED     RENAL FUNCTION PANEL    Collection Time: 04/09/21  1:00 AM   Result Value Ref Range    Sodium 134 (L) 136 - 145 mmol/L    Potassium 3.5 3.5 - 5.5 mmol/L    Chloride 96 (L) 100 - 111 mmol/L    CO2 30 21 - 32 mmol/L    Anion gap 8 3.0 - 18 mmol/L    Glucose 140 (H) 74 - 99 mg/dL    BUN 38 (H) 7.0 - 18 MG/DL    Creatinine 1.21 0.6 - 1.3 MG/DL    BUN/Creatinine ratio 31 (H) 12 - 20      GFR est AA >60 >60 ml/min/1.73m2    GFR est non-AA 58 (L) >60 ml/min/1.73m2    Calcium 7.9 (L) 8.5 - 10.1 MG/DL    Phosphorus 2.5 2.5 - 4.9 MG/DL    Albumin 2.9 (L) 3.4 - 5.0 g/dL   TYPE & SCREEN    Collection Time: 04/09/21 11:00 AM   Result Value Ref Range    Crossmatch Expiration 04/12/2021,2359     ABO/Rh(D) A POSITIVE     Antibody screen NEG     Comment       ABORH PERFORMED IN TUBE DUE TO BACKTYPE DISCREPANCY           No results for input(s): FIO2I, IFO2, HCO3I, IHCO3, HCOPOC, PCO2I, PCOPOC, IPHI, PHI, PHPOC, PO2I, PO2POC in the last 72 hours. No lab exists for component: IPOC2    All Micro Results     Procedure Component Value Units Date/Time    COVID-19 RAPID TEST [[id number]] Collected: 04/05/21 1539    Order Status: Completed Specimen: Nasopharyngeal Updated: 04/05/21 1616     Specimen source Nasopharyngeal        COVID-19 rapid test Not detected        Comment: Rapid Abbott ID Now       Rapid NAAT:  The specimen is NEGATIVE for SARS-CoV-2, the novel coronavirus associated with COVID-19. Negative results should be treated as presumptive and, if inconsistent with clinical signs and symptoms or necessary for patient management, should be tested with an alternative molecular assay. Negative results do not preclude SARS-CoV-2 infection and should not be used as the sole basis for patient management decisions. This test has been authorized by the FDA under an Emergency Use Authorization (EUA) for use by authorized laboratories. Fact sheet for Healthcare Providers: [employer]  Fact sheet for Patients: [employer]       Methodology: Isothermal Nucleic Acid Amplification                 2/27/21 ECHO   · Left Ventricle: Normal cavity size, wall thickness and systolic function (ejection fraction normal). The estimated EF is 50 - 55%. There is mild (grade 1) left ventricular diastolic dysfunction E'E= 10.05. Poor resolution of endocardial border. Can not comment on wall motion abnormality  · Tricuspid Valve: Tricuspid valve not well visualized. No stenosis. Tricuspid regurgitation is inadequate for estimation of right ventricular systolic pressure      Imaging:  [x]I have personally reviewed the patients chest radiographs images and report with the patient  4/5/21  AP PORTABLE CHEST 4/5/21   Comparison : 02/28/21    FINDINGS: Patient is rotated to the right. The chin obscures the upper thorax.   There is mild motion degradation and external artifact which additionally limits visibility.     HEART AND MEDIASTINUM: Unremarkable.     LUNGS AND PLEURAL SPACES: Calcified pleural plaques present on the right. Vague  opacity in the right infrahilar region is less evident than the prior, but  degraded by motion. There is stable elevation of the right diaphragm.     BONY THORAX AND SOFT TISSUES: No acute osseous abnormality.     IMPRESSION     Improved aeration with mild, less evident ill-defined opacity in the right  infrahilar region, which can reflect chronic atelectasis or infiltrate. No  additional change. Results from [place] encounter on 04/05/21   XR KNEE LT MAX 2 VWS    Narrative HISTORY:   -Provided on order: pain, difficulty ambulating  -Additional: None    Technique: 2 views of left knee are presented for interpretation. Comparison study: none. Findings: The bones are osteopenic. There is no plain film evident fracture or  dislocation. No radiopaque foreign body or significant arthropathy. No  significant arthropathy. No plain film evident knee joint effusion is seen. Impression 1. No acute bony abnormality is identified by plain films. Intrinsic knee ligamentous, meniscal and cartilaginous integrity can be best  evaluated by routine knee MRI if clinically warranted. Results from [place] encounter on 04/05/21   CT HIP LT WO CONT    Narrative ---------------------------------------------------------------------------  <<<<<<<<<           [facility]           >>>>>>>>>   ---------------------------------------------------------------------------    HISTORY:   -From Provider:r/o avn, not candidate for mri  -Additional: None    COMPARISON EXAMINATIONS:   None. TECHNIQUE:   CT of the left hip without intravenous contrast administration. Coronal and sagittal reformats were generated and reviewed.     One or more dose reduction techniques were used on this CT: automated exposure  control, adjustment of the mAs and/or kVp according to patient size, and  iterative reconstruction techniques. The specific techniques used on this CT  exam have been documented in the patient's electronic medical record.      --------------------------     FINDINGS    --------------------------    OSSEOUS STRUCTURES:    Left hip: There is irregular subchondral lucency and cortical irregularity with  slight flattening/collapse of the left femoral head. Severe degenerative changes  in the right hip with severe joint space narrowing, subchondral cysts and slight  marginal spurring. Soft tissue thickening about the joint space suggesting hip  joint effusion. Visualized bony pelvis and proximal femur: Degenerative changes in the SI joints  and spine. Relatively severe central and foraminal stenosis at L4/5 and to  lesser degree at L3/4. Visualized pelvic soft tissues:  LYMPH NODES:  Subcentimeter short axis left groin lymph nodes are present. [ ]  GI TRACT:  Colonic diverticulosis, without CT evidence diverticulitis. Feculent  distention of the rectum. PELVIC ORGANS:  The visualized portion of the bladder is unremarkable. VASCULATURE:  Atherosclerotic calcification in visualized left iliac and femoral  arteries. OTHER:   No ascites or free intraperitoneal air in visualized extent. Fat-containing left inguinal hernia. Fatty change in the gluteal musculature  bilaterally. Subcutaneous stranding/edema along left more than right lateral  flank.      ---------------------------------------------------------------------------    Impression ---------------------------------------------------------------------------    1. Subchondral serpiginous lucencies in the weightbearing left femoral head  suspicious for avascular necrosis, with associated cortical irregularity in  keeping with flattening/collapse. Suspected left hip joint effusion.     2. Relatively severe mild degenerative changes in the left hip. Mild  degenerative changes in the right hip. Degenerative changes in the SI joints and  spine. In the setting of radiculopathy, routine lumbar spine MRI could best  further assess. [x]See my orders for details    My assessment, plan of care, findings, medications, side effects etc were discussed with:  [x]nursing []PT/OT    []respiratory therapy []Dr. Yaya Miles [x]Patient     [x]High complex decision making performed and > 50% time spent in face to face consultation.     Elena Merrill MD

## 2021-04-09 NOTE — CONSULTS
Consultation Note    Assessment:         Principal Problem:    Acute exacerbation of chronic obstructive pulmonary disease (COPD) ([facility] (2/8/2019)    Active Problems:    Hypertension ()      Asbestosis ([facility] (10/19/2018)      Debility (7/8/2019)      Hyponatremia (4/10/2020)      Hypokalemia (4/14/2020)      Left hip pain (4/5/2021)      PAF (paroxysmal atrial fibrillation) ([facility] (4/5/2021)      Avascular necrosis of bone of left hip ([facility] (4/8/2021)        Plan:     Left LESLIE    Subjective:     I was asked by Dr. Kenrick Jose to evaluate Noe Shukla for left hip pain. He  is a 66 y.o. male admitted on 4/5/2021 for Acute exacerbation of chronic obstructive pulmonary disease (COPD) ([facility] [J44.1]. He is unable to ambulate. . Painful ROM. Mike Angry       Allergies   Allergen Reactions    Aspirin Other (comments)     \"messes my stomach up\"       Current Facility-Administered Medications   Medication Dose Route Frequency Provider Last Rate Last Admin    famotidine (PEPCID) tablet 20 mg  20 mg Oral BID Karolina Cee MD   20 mg at 04/09/21 0857    methylPREDNISolone (PF) (Solu-MEDROL) injection 20 mg  20 mg IntraVENous Q12H Elena Colon MD        ceFAZolin (ANCEF) 2 g/20 mL in sterile water IV syringe  2 g IntraVENous Arlin Mendoza MD        sodium chloride (OCEAN) 0.65 % nasal squeeze bottle 2 Spray  2 Spray Both Nostrils Q2H PRN Karolina Cee MD        ipratropium (ATROVENT) 0.02 % nebulizer solution 0.5 mg  0.5 mg Nebulization QID RT Zach Brewer MD   0.5 mg at 04/09/21 1531    amLODIPine (NORVASC) tablet 5 mg  5 mg Oral DAILY Karolina Cee MD   5 mg at 04/09/21 0857    tamsulosin (FLOMAX) capsule 0.4 mg  0.4 mg Oral QPM Karolina Cee MD   0.4 mg at 04/08/21 1714    dilTIAZem IR (CARDIZEM) tablet 30 mg  30 mg Oral Cullen Mckeon MD   Stopped at 04/09/21 0730    furosemide (LASIX) tablet 20 mg  20 mg Oral DAILY Zahra Joseph MD   20 mg at 04/09/21 0856    azithromycin (ZITHROMAX) tablet 500 mg  500 mg Oral Q24H Karolina Cee MD   500 mg at 04/08/21 1714    cefTRIAXone (ROCEPHIN) 1 g in sterile water (preservative free) 10 mL IV syringe  1 g IntraVENous Q24H Kimberley Pallas, MD   1 g at 04/08/21 1714    sodium chloride (NS) flush 5-40 mL  5-40 mL IntraVENous Q8H Kimberley Pallas, MD   10 mL at 04/09/21 1328    sodium chloride (NS) flush 5-40 mL  5-40 mL IntraVENous PRN Kimberley Pallas, MD        acetaminophen (TYLENOL) tablet 650 mg  650 mg Oral Q6H PRN Kimberley Pallas, MD   650 mg at 04/06/21 0654    Or    acetaminophen (TYLENOL) suppository 650 mg  650 mg Rectal Q6H PRN Kimberley Pallas, MD        bisacodyL (DULCOLAX) suppository 10 mg  10 mg Rectal DAILY PRN Kimberley Pallas, MD        promethazine (PHENERGAN) tablet 12.5 mg  12.5 mg Oral Q6H PRN Kimberley Pallas, MD        Or    ondansetron [facility]) injection 4 mg  4 mg IntraVENous Q6H PRN Kimberley Pallas, MD        enoxaparin (LOVENOX) injection 40 mg  40 mg SubCUTAneous DAILY Kimberley Pallas, MD   Stopped at 04/09/21 0900    budesonide (PULMICORT) 500 mcg/2 ml nebulizer suspension  500 mcg Nebulization BID RT Kimberley Pallas, MD   500 mcg at 04/09/21 0717    arformoteroL (BROVANA) neb solution 15 mcg  15 mcg Nebulization BID RT Kimberley Pallas, MD   15 mcg at 04/09/21 0717    albuterol-ipratropium (DUO-NEB) 2.5 MG-0.5 MG/3 ML  3 mL Nebulization Q4H PRN Kimberley Pallas, MD   3 mL at 04/09/21 0420       Past Medical History:   Diagnosis Date    Brain aneurysm     Cancer [facility])     prostate    COPD (chronic obstructive pulmonary disease) ([address]     Hypertension     Nocturia     Pneumonia     Radiation effect      Past Surgical History:   Procedure Laterality Date    HX HERNIA REPAIR       Family History   Problem Relation Age of Onset    Heart Disease Mother      Social History     Socioeconomic History    Marital status:      Spouse name: Not on file    Number of children: Not on file    Years of education: Not on file    Highest education level: Not on file   Tobacco Use    Smoking status: Former Smoker     Types: Cigarettes    Smokeless tobacco: Never Used Substance and Sexual Activity    Alcohol use: No    Drug use: Never       Constitutional:  No Fever, chills, sweats; No weight loss    Skin:  No rash, ulcers; No itching   HEENT:  No changes in vision or hearing. No difficulty swallowing   Cardiovascular:  No CP, pressure, palpitations. No neck, jaw, or arm pain. No nausea or diaphoresis. No SOB, KUMAR, orthopnea, or PND. Respiratory:  Pt reports no cough, wheezing or SOB. No sputum production   Gastrointestinal:  No nausea, vomiting, constipation, or diarrhea. Genitourinary:  No dysuria;   Musculoskeletal:  + joint pain,   No back pain. Endo:     Heme:  . No unexplained fevers   Neurological:  No headache  No focal motor or sensory symptoms    Psychiatric:  No depression       Vitals:    04/09/21 1125 04/09/21 1131 04/09/21 1625 04/09/21 1716   BP: (!) 140/68  (!) 141/75 (!) 159/88   Pulse: 80  80 77   Resp: 24  22 20   Temp: 97.8 °F (36.6 °C)  97.6 °F (36.4 °C) 98.6 °F (37 °C)   SpO2: 98% 98% 99% 100%   Weight:       Height:           General appearance: alert, cooperative, no distress, appears stated age  Lungs: clear to auscultation bilaterally, wheezes global,   Heart: normal apical impulse  Abdomen:  soft  The left groin is TTP w/ painful very limited PROM. Adonna Dayhoff No swelling    Labs: Results:   CBC Recent Labs     04/09/21  0100   WBC 8.6   HCT 29.3*   MCV 86.4      Chemistry Recent Labs     04/09/21  0100   *   CO2 30   BUN 38*       No results for input(s): ALBUMIN, AGRAT in the last 72 hours. No lab exists for component: TOTPR, SGOTAST, ALKPHOS, BILIT, BILID, SGPTALT, GLOBULIN   Urine No results for input(s): UGLU in the last 72 hours. No lab exists for component: SOURCEUR, UBILIRUBIN, UKET, USPG, UOCB, UPH, UPSC, UUROBILISQ, UNITR, URINELEUKOC   POC Glucose  No components found for: [place]       Radiographs: AVN w/ multiple cystic changes/ collapse of the head. . ..joint interval narrowing/sclerosis     Rito Alicea MD

## 2021-04-09 NOTE — ANESTHESIA PROCEDURE NOTES
Spinal Block    Start time: 4/9/2021 5:54 PM  End time: 4/9/2021 6:20 PM  Performed by: Mel Mauro MD  Authorized by: John Appiah MD     Pre-procedure: Indications: primary anesthetic  Preanesthetic Checklist: risks and benefits discussed, site marked and timeout performed      Spinal Block:   Patient Position:  Seated  Prep Region:  Lumbar  Prep: chlorhexidine      Location:  L4-5          Needle:   Needle Type:  Pencil-tip  Needle Gauge:  25 G  Attempts:  1      Events: CSF confirmed, no blood with aspiration and no paresthesia        Assessment:  Insertion:  Uncomplicated    Spinal Placement    Patient placed in sitting position, back prepped and draped. Lidocaine infiltrated, L4-5. 25 ga 3.5 inch to hub by May-Such    Change to 22ga indra. Single pass. Crystal clear CSF. Superb flow. Inject      Good flow before, mid injection and after.

## 2021-04-09 NOTE — ANESTHESIA PREPROCEDURE EVALUATION
Relevant Problems   No relevant active problems       Anesthetic History   No history of anesthetic complications     Pertinent negatives: No PONV       Review of Systems / Medical History  Patient summary reviewed, nursing notes reviewed and pertinent labs reviewed    Pulmonary    COPD: severe      Shortness of breath    Pertinent negatives: Smoker: quit 19 months ago.      Neuro/Psych   Within defined limits        Pertinent negatives: No CVA   Cardiovascular  Within defined limits  Hypertension        Dysrhythmias            GI/Hepatic/Renal  Within defined limits           Pertinent negatives: No liver disease and renal disease   Endo/Other  Within defined limits        Pertinent negatives: No diabetes   Other Findings              Physical Exam    Airway  Mallampati: IV  TM Distance: 4 - 6 cm  Neck ROM: decreased range of motion   Mouth opening: Normal     Cardiovascular               Dental      Comments: A few left   Pulmonary                 Abdominal         Other Findings            Anesthetic Plan    ASA: 4  Anesthesia type: spinal          Induction: Intravenous  Anesthetic plan and risks discussed with: Patient

## 2021-04-09 NOTE — PROGRESS NOTES
0700 Assumed care of the patient from  Davidlesly Cooper (offgoing Nurse). Patient is alert and oriented. Pt denies any pain or discomfort at this moment. bed in low position, call bell within reach. 1657 TRANSFER - OUT REPORT:    Verbal report given to Maxi Jurado RN (name) on Smith Pineda  being transferred to OR (unit) for ordered procedure       Report consisted of patients Situation, Background, Assessment and   Recommendations(SBAR). Information from the following report(s) SBAR, Kardex, [facility] and Cardiac Rhythm SR was reviewed with the receiving nurse. Lines:   Peripheral IV 04/05/21 Left;Distal Forearm (Active)   Site Assessment Clean, dry, & intact 04/09/21 1143   Phlebitis Assessment 0 04/09/21 1143   Infiltration Assessment 0 04/09/21 1143   Dressing Status Clean, dry, & intact 04/09/21 1143   Dressing Type Tape;Transparent 04/09/21 1143   Hub Color/Line Status Capped;Flushed 04/09/21 1143   Action Taken Open ports on tubing capped 04/09/21 1143   Alcohol Cap Used Yes 04/09/21 1143       Peripheral IV 04/08/21 Anterior;Right Hand (Active)   Site Assessment Clean, dry, & intact 04/09/21 1143   Phlebitis Assessment 0 04/09/21 1143   Infiltration Assessment 0 04/09/21 1143   Dressing Status Clean, dry, & intact 04/09/21 1143   Dressing Type Tape;Transparent 04/09/21 1143   Hub Color/Line Status Capped;Flushed 04/09/21 1143   Action Taken Open ports on tubing capped 04/09/21 1143   Alcohol Cap Used Yes 04/09/21 1143        Opportunity for questions and clarification was provided. Patient transported with:   Monitor  Registered Nurse  Tech    4655 Verbal shift change report given to Clarence Thurston RN (oncoming nurse) by Nubia Siddiqi RN (offgoing nurse). Report included the following information SBAR, Kardex, MAR, Recent Results and Cardiac Rhythm .

## 2021-04-09 NOTE — PROGRESS NOTES
[id number]: The pt rested well overnight with no complaints. No new clinical issues. ! Breathing treatment given because of expiratory wheezing and pt request. Pt has been NPO since 0000.     Night shift chart check completed

## 2021-04-09 NOTE — PROGRESS NOTES
Pharmacy Dosing Services: Famotidine     Famotidine 20 mg po daily was automatically dose-adjusted to Famotidine 20 mg po BID per THE [facility] P&T Committee Protocol, with respect to renal function. Indication: symptomatic GERD    Pt Weight:   Wt Readings from Last 1 Encounters:   04/09/21 98.6 kg (217 lb 4.8 oz)     Serum Creatinine Lab Results   Component Value Date/Time    Creatinine 1.21 04/09/2021 01:00 AM    Creatinine, POC 1.3 10/30/2019 03:27 PM       Creatinine Clearance Estimated Creatinine Clearance: 57.2 mL/min (based on SCr of 1.21 mg/dL). BUN Lab Results   Component Value Date/Time    BUN 38 (H) 04/09/2021 01:00 AM    BUN, POC 30 (H) 10/30/2019 03:27 PM         Pharmacy to continue to monitor patient daily. Will make dosage adjustments based upon changing renal function. Signed Liliane Prince.  Contact information: [phone]

## 2021-04-09 NOTE — PROGRESS NOTES
D/C Plan: SNF     Noted plan for orthopedic surgical intervention (left hip surgery). Pt needs to get cardiology and pulmonary clearance to proceed with L hip surgery. Please re consult therapy following surgical intervention to assist with transition of care. Anticipate pt will remain inpt through the weekend. Please consider obtaining a standard COVID (PCR) in the event some facilities will not accept a rapid COVID. CM to continue to follow and assist.    1200:  Noted an order has been placed for a standard COVID (PCR). CM notified nursing of order to assist with transition of care.   CM to continue to follow and assist.     Care Management Interventions  Mode of Transport at Discharge: S  Transition of Care Consult (CM Consult): Discharge Planning, SNF  Health Maintenance Reviewed: Yes  Physical Therapy Consult: Yes  Occupational Therapy Consult: Yes  Current Support Network: Lives with Spouse  The Plan for Transition of Care is Related to the Following Treatment Goals : [place] and physician follow up vs SNF  The Patient and/or Patient Representative was Provided with a Choice of Provider and Agrees with the Discharge Plan?: Yes  Name of the Patient Representative Who was Provided with a Choice of Provider and Agrees with the Discharge Plan: Valerio Plant  [place] of Choice List was Provided with Basic Dialogue that Supports the Patient's Individualized Plan of Care/Goals, Treatment Preferences and Shares the Quality Data Associated with the Providers?: Yes  Discharge Location  Discharge Placement: Skilled nursing facility(vs [facility])

## 2021-04-09 NOTE — PROGRESS NOTES
Patient seen and surgical plan discussed with patient by Dr. Odalys Valverde. Dr. Odalys Valverde unable to log on to Epic-- help desk line unavailable.

## 2021-04-09 NOTE — PROGRESS NOTES
Hospitalist Progress Note-critical care note     Patient: Luke Fernandes MRN: [id number]  CSN: [id number]    YOB: 1945  Age: 66 y.o. Sex: male    DOA: 4/5/2021 LOS:  LOS: 4 days            Chief complaint: left hip pain, paf, hyponatremia.  Debility , copd     Assessment/Plan         Hospital Problems  Date Reviewed: 2/27/2021          Codes Class Noted POA    Avascular necrosis of bone of left hip ([facility] ICD-10-CM: M87.052  ICD-9-CM: 733.42  4/8/2021 Unknown        Left hip pain ICD-10-CM: M25.552  ICD-9-CM: 719.45  4/5/2021 Unknown        PAF (paroxysmal atrial fibrillation) ([facility]) ICD-10-CM: I48.0  ICD-9-CM: 427.31  4/5/2021 Unknown        Hypokalemia ICD-10-CM: E87.6  ICD-9-CM: 276.8  4/14/2020 Yes        Hyponatremia ICD-10-CM: E87.1  ICD-9-CM: 276.1  4/10/2020 Yes        Debility ICD-10-CM: R53.81  ICD-9-CM: 799.3  7/8/2019 Yes        * (Principal) Acute exacerbation of chronic obstructive pulmonary disease (COPD) ([facility] ICD-10-CM: J44.1  ICD-9-CM: 491.21  2/8/2019 Unknown        Asbestosis ([facility] ICD-10-CM: Y84  ICD-9-CM: 526  10/19/2018 Yes        Hypertension ICD-10-CM: H97  ICD-9-CM: 401.9  Unknown Yes                Patient was admitted due to hyponatremia and hypokalemia and COPD exacerbation.   also needs left hip surgery not able to walk hopefully today      Acute COPD exacerbation  Continue improving   Iv steroid, breathing tx, empiric abx   Rapid covid 19 undetected      Asbestosis and chronic respiration failure with hypoxia   On home O2 2 L       paf   Continue cardizem   Post op needs theraputic anticoagulation start  Per cardio recommendations      Hypertension: On Cardizem      Hypokalemia and hyponatremia   K  Replacement , Na is 131   Nephrologist f/u with pt      Fluid overloaded-resolving   dvt negative  Lasix per renal   Echo ef 36-15 % mild systolic dysfunction-done in Feb, 2021     Hearing loss   Having hearing aid   communication board      Hip pain -AVN -appreciated  Brian f/u with pt  Appreciated ortho/card/pulm on board will have surgery today  Cardiac testing negative       Subjective: how I feel better I can not walk. I want to have surgery done   I have not eating all day im hungry   CM was at the bedside     Disposition :tbd,   Review of systems:    General: No fevers or chills. Cardiovascular: No chest pain or pressure. No palpitations. Pulmonary: No shortness of breath. Gastrointestinal: No nausea, vomiting. Msk: left hip pain     Vital signs/Intake and Output:  Visit Vitals  /68   Pulse 85   Temp 97.5 °F (36.4 °C)   Resp 22   Ht 5' 7\" (1.702 m)   Wt 98.6 kg (217 lb 4.8 oz)   SpO2 99%   BMI 34.03 kg/m²     Current Shift:  No intake/output data recorded. Last three shifts:  04/07 1901 - 04/09 0700  In: 36 [P.O.:820]  Out: 2325 [Urine:2325]    Physical Exam:  General: WD, WN. Alert, cooperative, no acute distress  [place] grunts on chronic home oxygen of 2liters   HEENT: NC, Atraumatic. PERRLA, anicteric sclerae. Lungs: CTA Bilaterally. +Wheezing/Rhonchi/Rales. Heart:  Regular  rhythm,  No murmur, No Rubs, No Gallops  Abdomen: Soft, Non distended, Non tender. +Bowel sounds,   Extremities: Plus one pitting edema noted   Psych:   Not anxious or agitated. Neurologic:  Left hip TTP             Labs: Results:       Chemistry Recent Labs     04/09/21  0100 04/08/21  0240 04/07/21  0055   * 145* 163*   * 131* 130*   K 3.5 3.5 3.2*   CL 96* 93* 91*   CO2 30 30 28   BUN 38* 41* 39*   CREA 1.21 1.27 1.56*   CA 7.9* 7.9* 8.4*   AGAP 8 8 11   BUCR 31* 32* 25*   ALB 2.9* 2.9*  --       CBC w/Diff Recent Labs     04/09/21  0100 04/08/21  0240 04/07/21  0055   WBC 8.6 12.1 14.4*   RBC 3.39* 3.28* 3.30*   HGB 9.6* 9.7* 9.7*   HCT 29.3* 27.9* 28.0*    333 369   GRANS 86* 91* 91*   LYMPH 5* 4* 4*   EOS 0 0 0      Cardiac Enzymes No results for input(s): CPK, CKND1, WILLARD in the last 72 hours.     No lab exists for component: CKRMB, TROIP   Coagulation No results for input(s): PTP, INR, APTT, INREXT, INREXT in the last 72 hours. Lipid Panel No results found for: CHOL, CHOLPOCT, CHOLX, CHLST, CHOLV, 643412, HDL, HDLP, LDL, LDLC, DLDLP, 811583, VLDLC, VLDL, TGLX, TRIGL, TRIGP, TGLPOCT, CHHD, CHHDX   BNP No results for input(s): BNPP in the last 72 hours. Liver Enzymes Recent Labs     04/09/21  0100   ALB 2.9*      Thyroid Studies Lab Results   Component Value Date/Time    TSH 3.06 04/05/2021 01:30 PM        Procedures/imaging: see electronic medical records for all procedures/Xrays and details which were not copied into this note but were reviewed prior to creation of Plan    Xr Hip Lt W Or Wo Pelv 2-3 Vws    Result Date: 4/5/2021  EXAM: LEFT HIP RADIOGRAPHS CLINICAL INDICATION/HISTORY: left hip pain, difficulty ambulating -Additional: None COMPARISON: Supine with 2/26/2021 TECHNIQUE: AP pelvis and frog-leg lateral view of left hip. _______________ FINDINGS: BONES: Severe left hip osteoarthrosis with femoral head remodeling and collapse. No evidence of acute displaced fracture or dislocation. SOFT TISSUES: Unremarkable. _______________     Severe left hip osteoarthrosis with femoral head collapse/remodeling. Sclerosis and lucency in femoral head may reflect collapse/remodeling versus AVN. Consider MRI in the appropriate clinical setting. No evidence of acute fracture or dislocation. Xr Knee Lt Max 2 Vws    Result Date: 4/5/2021  HISTORY: -Provided on order: pain, difficulty ambulating -Additional: None Technique: 2 views of left knee are presented for interpretation. Comparison study: none. Findings: The bones are osteopenic. There is no plain film evident fracture or dislocation. No radiopaque foreign body or significant arthropathy. No significant arthropathy. No plain film evident knee joint effusion is seen. 1. No acute bony abnormality is identified by plain films.  Intrinsic knee ligamentous, meniscal and cartilaginous integrity can be best evaluated by routine knee MRI if clinically warranted. Xr Chest Port    Result Date: 4/5/2021  HISTORY: -Provided with order: SOB -Additional: Bilateral lower extremity swelling. Technique : AP PORTABLE CHEST Comparison : 02/28/21 FINDINGS: Patient is rotated to the right. The chin obscures the upper thorax. There is mild motion degradation and external artifact which additionally limits visibility. HEART AND MEDIASTINUM: Unremarkable. LUNGS AND PLEURAL SPACES: Calcified pleural plaques present on the right. Vague opacity in the right infrahilar region is less evident than the prior, but degraded by motion. There is stable elevation of the right diaphragm. BONY THORAX AND SOFT TISSUES: No acute osseous abnormality. Improved aeration with mild, less evident ill-defined opacity in the right infrahilar region, which can reflect chronic atelectasis or infiltrate. No additional change. Duplex Lower Ext Venous Bilat    Result Date: 4/5/2021  · No evidence of deep vein thrombosis in the right lower extremity veins assessed. · No evidence of deep vein thrombosis in the left lower extremity veins assessed.

## 2021-04-10 ENCOUNTER — APPOINTMENT (OUTPATIENT)
Dept: CT IMAGING | Age: 76
DRG: 469 | End: 2021-04-10
Attending: INTERNAL MEDICINE
Payer: MEDICARE

## 2021-04-10 ENCOUNTER — APPOINTMENT (OUTPATIENT)
Dept: VASCULAR SURGERY | Age: 76
DRG: 469 | End: 2021-04-10
Attending: INTERNAL MEDICINE
Payer: MEDICARE

## 2021-04-10 ENCOUNTER — APPOINTMENT (OUTPATIENT)
Dept: GENERAL RADIOLOGY | Age: 76
DRG: 469 | End: 2021-04-10
Attending: INTERNAL MEDICINE
Payer: MEDICARE

## 2021-04-10 PROBLEM — J81.0 ACUTE PULMONARY EDEMA (HCC): Status: ACTIVE | Noted: 2021-04-10

## 2021-04-10 PROBLEM — J18.9 CAP (COMMUNITY ACQUIRED PNEUMONIA): Status: RESOLVED | Noted: 2020-08-05 | Resolved: 2021-04-10

## 2021-04-10 PROBLEM — J44.9 COPD (CHRONIC OBSTRUCTIVE PULMONARY DISEASE) (HCC): Status: RESOLVED | Noted: 2020-08-05 | Resolved: 2021-04-10

## 2021-04-10 PROBLEM — J61 ASBESTOSIS (HCC): Status: RESOLVED | Noted: 2018-10-19 | Resolved: 2021-04-10

## 2021-04-10 PROBLEM — J96.01 ACUTE RESPIRATORY FAILURE WITH HYPOXIA AND HYPERCARBIA (HCC): Status: RESOLVED | Noted: 2020-04-09 | Resolved: 2021-04-10

## 2021-04-10 PROBLEM — J96.02 ACUTE RESPIRATORY FAILURE WITH HYPOXIA AND HYPERCARBIA (HCC): Status: RESOLVED | Noted: 2020-04-09 | Resolved: 2021-04-10

## 2021-04-10 PROBLEM — J96.11 CHRONIC RESPIRATORY FAILURE WITH HYPOXIA (HCC): Status: RESOLVED | Noted: 2019-08-19 | Resolved: 2021-04-10

## 2021-04-10 PROBLEM — E87.1 HYPONATREMIA: Status: RESOLVED | Noted: 2020-04-10 | Resolved: 2021-04-10

## 2021-04-10 PROBLEM — J96.01 ACUTE RESPIRATORY FAILURE WITH HYPOXEMIA (HCC): Status: ACTIVE | Noted: 2021-04-10

## 2021-04-10 PROBLEM — I48.91 ATRIAL FIBRILLATION WITH RVR (HCC): Status: RESOLVED | Noted: 2021-02-02 | Resolved: 2021-04-10

## 2021-04-10 PROBLEM — R06.03 RESPIRATORY DISTRESS: Status: RESOLVED | Noted: 2021-02-02 | Resolved: 2021-04-10

## 2021-04-10 PROBLEM — J96.02 ACUTE RESPIRATORY FAILURE WITH HYPOXIA AND HYPERCAPNIA (HCC): Status: ACTIVE | Noted: 2021-04-10

## 2021-04-10 PROBLEM — J90 PLEURAL EFFUSION, RIGHT: Status: RESOLVED | Noted: 2020-02-22 | Resolved: 2021-04-10

## 2021-04-10 PROBLEM — E66.9 OBESITY: Status: ACTIVE | Noted: 2021-04-10

## 2021-04-10 PROBLEM — J44.1 COPD EXACERBATION (HCC): Status: RESOLVED | Noted: 2021-02-02 | Resolved: 2021-04-10

## 2021-04-10 PROBLEM — I48.0 PAROXYSMAL ATRIAL FIBRILLATION (HCC): Status: RESOLVED | Noted: 2019-08-19 | Resolved: 2021-04-10

## 2021-04-10 PROBLEM — E87.1 HYPONATREMIA: Status: RESOLVED | Noted: 2020-02-21 | Resolved: 2021-04-10

## 2021-04-10 PROBLEM — E87.6 HYPOKALEMIA: Status: RESOLVED | Noted: 2020-04-14 | Resolved: 2021-04-10

## 2021-04-10 PROBLEM — J96.00 ACUTE RESPIRATORY FAILURE (HCC): Status: RESOLVED | Noted: 2021-02-26 | Resolved: 2021-04-10

## 2021-04-10 PROBLEM — J44.1 COPD WITH ACUTE EXACERBATION (HCC): Status: RESOLVED | Noted: 2020-04-09 | Resolved: 2021-04-10

## 2021-04-10 LAB
ALBUMIN SERPL-MCNC: 2.6 G/DL (ref 3.4–5)
ALBUMIN SERPL-MCNC: 2.9 G/DL (ref 3.4–5)
ALBUMIN/GLOB SERPL: 1.1 {RATIO} (ref 0.8–1.7)
ALP SERPL-CCNC: 68 U/L (ref 45–117)
ALT SERPL-CCNC: 21 U/L (ref 16–61)
ANION GAP SERPL CALC-SCNC: 6 MMOL/L (ref 3–18)
ANION GAP SERPL CALC-SCNC: 8 MMOL/L (ref 3–18)
ARTERIAL PATENCY WRIST A: YES
AST SERPL-CCNC: 21 U/L (ref 10–38)
BASE EXCESS BLD CALC-SCNC: 6 MMOL/L
BASOPHILS # BLD: 0 K/UL (ref 0–0.1)
BASOPHILS # BLD: 0 K/UL (ref 0–0.1)
BASOPHILS NFR BLD: 0 % (ref 0–2)
BASOPHILS NFR BLD: 0 % (ref 0–2)
BDY SITE: ABNORMAL
BILIRUB SERPL-MCNC: 0.3 MG/DL (ref 0.2–1)
BUN SERPL-MCNC: 35 MG/DL (ref 7–18)
BUN SERPL-MCNC: 39 MG/DL (ref 7–18)
BUN/CREAT SERPL: 25 (ref 12–20)
BUN/CREAT SERPL: 30 (ref 12–20)
CALCIUM SERPL-MCNC: 7.6 MG/DL (ref 8.5–10.1)
CALCIUM SERPL-MCNC: 7.7 MG/DL (ref 8.5–10.1)
CHLORIDE SERPL-SCNC: 98 MMOL/L (ref 100–111)
CHLORIDE SERPL-SCNC: 98 MMOL/L (ref 100–111)
CK MB CFR SERPL CALC: 1.6 % (ref 0–4)
CK MB CFR SERPL CALC: 2.5 % (ref 0–4)
CK MB SERPL-MCNC: 1.8 NG/ML (ref 5–25)
CK MB SERPL-MCNC: 2.8 NG/ML (ref 5–25)
CK SERPL-CCNC: 113 U/L (ref 39–308)
CK SERPL-CCNC: 113 U/L (ref 39–308)
CO2 SERPL-SCNC: 31 MMOL/L (ref 21–32)
CO2 SERPL-SCNC: 32 MMOL/L (ref 21–32)
CREAT SERPL-MCNC: 1.16 MG/DL (ref 0.6–1.3)
CREAT SERPL-MCNC: 1.59 MG/DL (ref 0.6–1.3)
D DIMER PPP FEU-MCNC: 8.26 UG/ML(FEU)
DIFFERENTIAL METHOD BLD: ABNORMAL
DIFFERENTIAL METHOD BLD: ABNORMAL
EOSINOPHIL # BLD: 0 K/UL (ref 0–0.4)
EOSINOPHIL # BLD: 0 K/UL (ref 0–0.4)
EOSINOPHIL NFR BLD: 0 % (ref 0–5)
EOSINOPHIL NFR BLD: 0 % (ref 0–5)
ERYTHROCYTE [DISTWIDTH] IN BLOOD BY AUTOMATED COUNT: 13.3 % (ref 11.6–14.5)
ERYTHROCYTE [DISTWIDTH] IN BLOOD BY AUTOMATED COUNT: 13.4 % (ref 11.6–14.5)
GAS FLOW.O2 O2 DELIVERY SYS: ABNORMAL L/MIN
GLOBULIN SER CALC-MCNC: 2.7 G/DL (ref 2–4)
GLUCOSE SERPL-MCNC: 141 MG/DL (ref 74–99)
GLUCOSE SERPL-MCNC: 183 MG/DL (ref 74–99)
HCO3 BLD-SCNC: 32.4 MMOL/L (ref 22–26)
HCT VFR BLD AUTO: 28.8 % (ref 36–48)
HCT VFR BLD AUTO: 30.7 % (ref 36–48)
HGB BLD-MCNC: 10.1 G/DL (ref 13–16)
HGB BLD-MCNC: 9.4 G/DL (ref 13–16)
LYMPHOCYTES # BLD: 0.4 K/UL (ref 0.9–3.6)
LYMPHOCYTES # BLD: 1.3 K/UL (ref 0.9–3.6)
LYMPHOCYTES NFR BLD: 2 % (ref 21–52)
LYMPHOCYTES NFR BLD: 5 % (ref 21–52)
MAGNESIUM SERPL-MCNC: 2.2 MG/DL (ref 1.6–2.6)
MCH RBC QN AUTO: 28.7 PG (ref 24–34)
MCH RBC QN AUTO: 29.1 PG (ref 24–34)
MCHC RBC AUTO-ENTMCNC: 32.6 G/DL (ref 31–37)
MCHC RBC AUTO-ENTMCNC: 32.9 G/DL (ref 31–37)
MCV RBC AUTO: 87.8 FL (ref 74–97)
MCV RBC AUTO: 88.5 FL (ref 74–97)
METAMYELOCYTES NFR BLD MANUAL: 1 %
MONOCYTES # BLD: 1.4 K/UL (ref 0.05–1.2)
MONOCYTES # BLD: 1.6 K/UL (ref 0.05–1.2)
MONOCYTES NFR BLD: 6 % (ref 3–10)
MONOCYTES NFR BLD: 7 % (ref 3–10)
NEUTS SEG # BLD: 18 K/UL (ref 1.8–8)
NEUTS SEG # BLD: 23.1 K/UL (ref 1.8–8)
NEUTS SEG NFR BLD: 88 % (ref 40–73)
NEUTS SEG NFR BLD: 91 % (ref 40–73)
O2/TOTAL GAS SETTING VFR VENT: 100 %
PCO2 BLD: 68.2 MMHG (ref 35–45)
PEEP RESPIRATORY: 7 CMH2O
PH BLD: 7.29 [PH] (ref 7.35–7.45)
PHOSPHATE SERPL-MCNC: 3.3 MG/DL (ref 2.5–4.9)
PIP ISTAT,IPIP: 13
PLATELET # BLD AUTO: 344 K/UL (ref 135–420)
PLATELET # BLD AUTO: 430 K/UL (ref 135–420)
PMV BLD AUTO: 8.4 FL (ref 9.2–11.8)
PMV BLD AUTO: 8.7 FL (ref 9.2–11.8)
PO2 BLD: 447 MMHG (ref 80–100)
POTASSIUM SERPL-SCNC: 3.5 MMOL/L (ref 3.5–5.5)
POTASSIUM SERPL-SCNC: 3.8 MMOL/L (ref 3.5–5.5)
PROT SERPL-MCNC: 5.6 G/DL (ref 6.4–8.2)
RBC # BLD AUTO: 3.28 M/UL (ref 4.35–5.65)
RBC # BLD AUTO: 3.47 M/UL (ref 4.35–5.65)
RBC MORPH BLD: ABNORMAL
RBC MORPH BLD: ABNORMAL
SAO2 % BLD: 100 % (ref 92–97)
SARS-COV-2, COV2NT: NOT DETECTED
SERVICE CMNT-IMP: 10 L/MIN
SERVICE CMNT-IMP: ABNORMAL
SODIUM SERPL-SCNC: 136 MMOL/L (ref 136–145)
SODIUM SERPL-SCNC: 137 MMOL/L (ref 136–145)
SPECIMEN TYPE: ABNORMAL
TOTAL RESP. RATE, ITRR: 26
TROPONIN I SERPL-MCNC: <0.02 NG/ML (ref 0–0.04)
TROPONIN I SERPL-MCNC: <0.02 NG/ML (ref 0–0.04)
WBC # BLD AUTO: 19.8 K/UL (ref 4.6–13.2)
WBC # BLD AUTO: 26.2 K/UL (ref 4.6–13.2)
WBC MORPH BLD: ABNORMAL
WBC MORPH BLD: ABNORMAL

## 2021-04-10 PROCEDURE — 74011250636 HC RX REV CODE- 250/636: Performed by: PHYSICIAN ASSISTANT

## 2021-04-10 PROCEDURE — 74011000250 HC RX REV CODE- 250: Performed by: INTERNAL MEDICINE

## 2021-04-10 PROCEDURE — 93970 EXTREMITY STUDY: CPT

## 2021-04-10 PROCEDURE — 77010033678 HC OXYGEN DAILY

## 2021-04-10 PROCEDURE — 74011000250 HC RX REV CODE- 250

## 2021-04-10 PROCEDURE — 36415 COLL VENOUS BLD VENIPUNCTURE: CPT

## 2021-04-10 PROCEDURE — 97116 GAIT TRAINING THERAPY: CPT

## 2021-04-10 PROCEDURE — 74011250637 HC RX REV CODE- 250/637: Performed by: PHYSICIAN ASSISTANT

## 2021-04-10 PROCEDURE — 74011250636 HC RX REV CODE- 250/636: Performed by: INTERNAL MEDICINE

## 2021-04-10 PROCEDURE — 85025 COMPLETE CBC W/AUTO DIFF WBC: CPT

## 2021-04-10 PROCEDURE — 71045 X-RAY EXAM CHEST 1 VIEW: CPT

## 2021-04-10 PROCEDURE — 74011250637 HC RX REV CODE- 250/637: Performed by: HOSPITALIST

## 2021-04-10 PROCEDURE — 36600 WITHDRAWAL OF ARTERIAL BLOOD: CPT

## 2021-04-10 PROCEDURE — 94640 AIRWAY INHALATION TREATMENT: CPT

## 2021-04-10 PROCEDURE — 85379 FIBRIN DEGRADATION QUANT: CPT

## 2021-04-10 PROCEDURE — 97162 PT EVAL MOD COMPLEX 30 MIN: CPT

## 2021-04-10 PROCEDURE — 74011000250 HC RX REV CODE- 250: Performed by: HOSPITALIST

## 2021-04-10 PROCEDURE — 82553 CREATINE MB FRACTION: CPT

## 2021-04-10 PROCEDURE — 80069 RENAL FUNCTION PANEL: CPT

## 2021-04-10 PROCEDURE — 94660 CPAP INITIATION&MGMT: CPT

## 2021-04-10 PROCEDURE — 80053 COMPREHEN METABOLIC PANEL: CPT

## 2021-04-10 PROCEDURE — 74011000250 HC RX REV CODE- 250: Performed by: PHYSICIAN ASSISTANT

## 2021-04-10 PROCEDURE — 65610000006 HC RM INTENSIVE CARE

## 2021-04-10 PROCEDURE — 74011250636 HC RX REV CODE- 250/636: Performed by: HOSPITALIST

## 2021-04-10 PROCEDURE — 0SRB0JZ REPLACEMENT OF LEFT HIP JOINT WITH SYNTHETIC SUBSTITUTE, OPEN APPROACH: ICD-10-PCS | Performed by: ORTHOPAEDIC SURGERY

## 2021-04-10 PROCEDURE — 94761 N-INVAS EAR/PLS OXIMETRY MLT: CPT

## 2021-04-10 PROCEDURE — 74011250637 HC RX REV CODE- 250/637: Performed by: INTERNAL MEDICINE

## 2021-04-10 PROCEDURE — 82803 BLOOD GASES ANY COMBINATION: CPT

## 2021-04-10 PROCEDURE — 83735 ASSAY OF MAGNESIUM: CPT

## 2021-04-10 RX ORDER — FUROSEMIDE 10 MG/ML
40 INJECTION INTRAMUSCULAR; INTRAVENOUS ONCE
Status: COMPLETED | OUTPATIENT
Start: 2021-04-10 | End: 2021-04-10

## 2021-04-10 RX ORDER — MORPHINE SULFATE 2 MG/ML
1 INJECTION, SOLUTION INTRAMUSCULAR; INTRAVENOUS
Status: DISCONTINUED | OUTPATIENT
Start: 2021-04-10 | End: 2021-04-11

## 2021-04-10 RX ORDER — BUDESONIDE 0.25 MG/2ML
INHALANT ORAL
Status: COMPLETED
Start: 2021-04-10 | End: 2021-04-10

## 2021-04-10 RX ORDER — BUDESONIDE 0.5 MG/2ML
INHALANT ORAL
Status: DISPENSED
Start: 2021-04-10 | End: 2021-04-11

## 2021-04-10 RX ADMIN — METHYLPREDNISOLONE SODIUM SUCCINATE 40 MG: 40 INJECTION, POWDER, FOR SOLUTION INTRAMUSCULAR; INTRAVENOUS at 14:16

## 2021-04-10 RX ADMIN — IPRATROPIUM BROMIDE 0.5 MG: 0.5 SOLUTION RESPIRATORY (INHALATION) at 07:12

## 2021-04-10 RX ADMIN — ARFORMOTEROL TARTRATE 15 MCG: 15 SOLUTION RESPIRATORY (INHALATION) at 00:36

## 2021-04-10 RX ADMIN — KETOROLAC TROMETHAMINE 15 MG: 30 INJECTION, SOLUTION INTRAMUSCULAR at 12:24

## 2021-04-10 RX ADMIN — SENNOSIDES 8.6 MG: 8.6 TABLET, FILM COATED ORAL at 09:03

## 2021-04-10 RX ADMIN — DILTIAZEM HYDROCHLORIDE 30 MG: 30 TABLET, FILM COATED ORAL at 16:15

## 2021-04-10 RX ADMIN — IPRATROPIUM BROMIDE 0.5 MG: 0.5 SOLUTION RESPIRATORY (INHALATION) at 00:36

## 2021-04-10 RX ADMIN — BUDESONIDE 500 MCG: 0.5 INHALANT RESPIRATORY (INHALATION) at 07:12

## 2021-04-10 RX ADMIN — FUROSEMIDE 20 MG: 20 TABLET ORAL at 09:12

## 2021-04-10 RX ADMIN — HYDROCODONE BITARTRATE AND ACETAMINOPHEN 1 TABLET: 7.5; 325 TABLET ORAL at 09:02

## 2021-04-10 RX ADMIN — IPRATROPIUM BROMIDE 0.5 MG: 0.5 SOLUTION RESPIRATORY (INHALATION) at 20:33

## 2021-04-10 RX ADMIN — METHYLPREDNISOLONE SODIUM SUCCINATE 20 MG: 40 INJECTION, POWDER, FOR SOLUTION INTRAMUSCULAR; INTRAVENOUS at 09:03

## 2021-04-10 RX ADMIN — ENOXAPARIN SODIUM 40 MG: 40 INJECTION SUBCUTANEOUS at 09:11

## 2021-04-10 RX ADMIN — Medication 10 ML: at 21:05

## 2021-04-10 RX ADMIN — METHYLPREDNISOLONE SODIUM SUCCINATE 20 MG: 40 INJECTION, POWDER, FOR SOLUTION INTRAMUSCULAR; INTRAVENOUS at 21:02

## 2021-04-10 RX ADMIN — SENNOSIDES 8.6 MG: 8.6 TABLET, FILM COATED ORAL at 21:07

## 2021-04-10 RX ADMIN — AMLODIPINE BESYLATE 5 MG: 5 TABLET ORAL at 09:03

## 2021-04-10 RX ADMIN — DILTIAZEM HYDROCHLORIDE 30 MG: 30 TABLET, FILM COATED ORAL at 12:23

## 2021-04-10 RX ADMIN — FAMOTIDINE 20 MG: 20 TABLET, FILM COATED ORAL at 09:03

## 2021-04-10 RX ADMIN — IPRATROPIUM BROMIDE 0.5 MG: 0.5 SOLUTION RESPIRATORY (INHALATION) at 15:02

## 2021-04-10 RX ADMIN — Medication 10 ML: at 14:00

## 2021-04-10 RX ADMIN — IPRATROPIUM BROMIDE AND ALBUTEROL SULFATE 3 ML: .5; 3 SOLUTION RESPIRATORY (INHALATION) at 14:00

## 2021-04-10 RX ADMIN — Medication 10 ML: at 06:00

## 2021-04-10 RX ADMIN — CEFAZOLIN 2 G: 10 INJECTION, POWDER, FOR SOLUTION INTRAVENOUS at 03:12

## 2021-04-10 RX ADMIN — FERROUS SULFATE TAB 325 MG (65 MG ELEMENTAL FE) 325 MG: 325 (65 FE) TAB at 09:03

## 2021-04-10 RX ADMIN — TAMSULOSIN HYDROCHLORIDE 0.4 MG: 0.4 CAPSULE ORAL at 16:15

## 2021-04-10 RX ADMIN — SODIUM CHLORIDE 75 ML/HR: 900 INJECTION, SOLUTION INTRAVENOUS at 09:18

## 2021-04-10 RX ADMIN — FUROSEMIDE 40 MG: 10 INJECTION, SOLUTION INTRAMUSCULAR; INTRAVENOUS at 14:28

## 2021-04-10 RX ADMIN — MORPHINE SULFATE 1 MG: 2 INJECTION, SOLUTION INTRAMUSCULAR; INTRAVENOUS at 13:51

## 2021-04-10 RX ADMIN — BUDESONIDE 250 MCG: 0.25 INHALANT RESPIRATORY (INHALATION) at 20:33

## 2021-04-10 RX ADMIN — ARFORMOTEROL TARTRATE 15 MCG: 15 SOLUTION RESPIRATORY (INHALATION) at 20:33

## 2021-04-10 RX ADMIN — FERROUS SULFATE TAB 325 MG (65 MG ELEMENTAL FE) 325 MG: 325 (65 FE) TAB at 16:14

## 2021-04-10 RX ADMIN — DILTIAZEM HYDROCHLORIDE 30 MG: 30 TABLET, FILM COATED ORAL at 07:05

## 2021-04-10 RX ADMIN — ARFORMOTEROL TARTRATE 15 MCG: 15 SOLUTION RESPIRATORY (INHALATION) at 07:12

## 2021-04-10 RX ADMIN — BUDESONIDE 500 MCG: 0.5 INHALANT RESPIRATORY (INHALATION) at 20:35

## 2021-04-10 RX ADMIN — IPRATROPIUM BROMIDE 0.5 MG: 0.5 SOLUTION RESPIRATORY (INHALATION) at 11:57

## 2021-04-10 RX ADMIN — CEFAZOLIN 2 G: 10 INJECTION, POWDER, FOR SOLUTION INTRAVENOUS at 09:16

## 2021-04-10 RX ADMIN — FAMOTIDINE 20 MG: 20 TABLET, FILM COATED ORAL at 21:02

## 2021-04-10 NOTE — PROGRESS NOTES
Progress Note     Patient: Mikayla Mcconnell MRN: [id number]  SSN: [id number]    YOB: 1945  Age: 66 y.o. Sex: male      POD:    1 Day Post-Op  S/P:    Procedure(s):  HIP ARTHROPLASTY TOTAL ANTERIOR APPROACH - WITH C-ARM,     Subjective:   Pt is with complaints of pain in the left hip. He states he has not yet been up to walk or done any bed transfers as of yet. He has taken PO pain medication and has been using ice to control his discomfort. Objective:     Patient Vitals for the past 12 hrs:   BP Temp Pulse Resp SpO2   04/10/21 0805 (!) 137/47 98 °F (36.7 °C) 72 18 95 %   04/10/21 0705 (!) 142/71 -- 76 -- --   04/10/21 0053 121/66 98 °F (36.7 °C) 99 18 93 %     Recent Labs     04/10/21  0435   HGB 9.4*   HCT 28.8*      K 3.5   CL 98*   CO2 32   BUN 35*   CREA 1.16   *       Pt. resting in bed. Lower extremity operative dressing clean/dry/intact; moderate ecchymosis and swelling lateral to incision as can be expected, scant blood shadowing on honeycomb dressing. Neurovascularly intact. Assessment:     Awake and alert. In good spirits. S/P L THR, anterior approach         Plan:   1. Continue pain management/ ice to operative area. 2. Continue to progress with PT/OT. Patient encouraged to work on bed transfers and mobility over the weekend.   3. Discharge planning per primary team.

## 2021-04-10 NOTE — ANESTHESIA POSTPROCEDURE EVALUATION
Procedure(s):  HIP ARTHROPLASTY TOTAL ANTERIOR APPROACH - WITH C-ARM,.    spinal, MAC    Anesthesia Post Evaluation        Comments: Post-Anesthesia Evaluation and Assessment    Cardiovascular Function/Vital Signs  /78   Pulse 76   Temp 36.2 °C (97.1 °F)   Resp 21   Ht 5' 7\" (1.702 m)   Wt 98.6 kg (217 lb 4.8 oz)   SpO2 93%   BMI 34.03 kg/m²     Patient is status post Procedure(s):  HIP ARTHROPLASTY TOTAL ANTERIOR APPROACH - WITH C-ARM,. Nausea/Vomiting: Controlled. Postoperative hydration reviewed and adequate. Pain:  Pain Scale 1: Numeric (0 - 10) (04/09/21 2002)  Pain Intensity 1: 0 (04/09/21 2002)   Managed. Neurological Status:   Neuro (WDL): Within Defined Limits (04/09/21 2002)   At baseline. Mental Status and Level of Consciousness: Arousable. Pulmonary Status:   O2 Device: None (Room air) (04/09/21 2012)   Adequate oxygenation and airway patent. Complications related to anesthesia: None    Post-anesthesia assessment completed. No concerns. Patient has met all discharge requirements. Signed By: Mariana Chávez MD    April 9, 2021                     INITIAL Post-op Vital signs:   Vitals Value Taken Time   /66 04/09/21 2021   Temp 36.2 °C (97.1 °F) 04/09/21 1934   Pulse 74 04/09/21 2022   Resp 23 04/09/21 2022   SpO2 97 % 04/09/21 2022   Vitals shown include unvalidated device data.

## 2021-04-10 NOTE — PERIOP NOTES
TRANSFER - OUT REPORT:    Verbal report given to [facility] on Brandi Garland  being transferred to [phone] for routine post - op       Report consisted of patients Situation, Background, Assessment and   Recommendations(SBAR). Information from the following report(s) SBAR, OR Summary and Recent Results was reviewed with the receiving nurse. Lines:   Peripheral IV 04/05/21 Left;Distal Forearm (Active)   Site Assessment Clean, dry, & intact 04/09/21 2002   Phlebitis Assessment 0 04/09/21 2002   Infiltration Assessment 0 04/09/21 2002   Dressing Status Clean, dry, & intact 04/09/21 2002   Dressing Type Transparent;Tape 04/09/21 2002   Hub Color/Line Status Pink 04/09/21 2002   Action Taken Open ports on tubing capped 04/09/21 1625   Alcohol Cap Used Yes 04/09/21 1625       Peripheral IV 04/08/21 Anterior;Right Hand (Active)   Site Assessment Clean, dry, & intact 04/09/21 2002   Phlebitis Assessment 0 04/09/21 2002   Infiltration Assessment 0 04/09/21 2002   Dressing Status Clean, dry, & intact 04/09/21 2002   Dressing Type Transparent;Tape 04/09/21 2002   Hub Color/Line Status Blue 04/09/21 2002   Action Taken Open ports on tubing capped 04/09/21 1625   Alcohol Cap Used Yes 04/09/21 1625        Opportunity for questions and clarification was provided.       Patient transported with:   Registered Nurse

## 2021-04-10 NOTE — PROGRESS NOTES
Patient transferred to [facility] via bed.  VSS     04/09/21 2040   Vital Signs   Temp 97.6 °F (36.4 °C)   Temp Source Oral   Pulse (Heart Rate) 70   Resp Rate 18   /61   Oxygen Therapy   O2 Sat (%) 94 %

## 2021-04-10 NOTE — PROGRESS NOTES
Problem: Mobility Impaired (Adult and Pediatric)  Goal: *Acute Goals and Plan of Care (Insert Text)  Description: Physical Therapy Goals   Initiated 4/10/2021 and to be accomplished within 3-5 day(s)  1. Patient will move from supine <> sit with S in prep for out of bed activity and change of position. 2.  Patient will perform sit<> stand with S with LRAD in prep for transfers/ambulation. 3.  Patient will transfer from bed <> chair with S with LRAD for time up in chair for completion of ADL activity. 4.  Patient will ambulate >50 feet with CGA/LRAD for improved functional mobility at discharge. 5.  Patient will ascend/descend 3-5 stairs with handrails with minimal assistance/contact guard assist for home re-entry as needed. Outcome: Progressing Towards Goal   physical Therapy EVALUATION    Patient: Danya Finch (58 y.o. male)  Date: 4/10/2021  Primary Diagnosis: Acute exacerbation of chronic obstructive pulmonary disease (COPD) ([facility]) [J44.1]  Procedure(s) (LRB):  HIP ARTHROPLASTY TOTAL ANTERIOR APPROACH - WITH C-ARM, (Left) 1 Day Post-Op   Precautions: Fall, WBAT    ASSESSMENT :  Based on the objective data described below, the patient presents with decrease independence with bed mobility, transfers, gait, and step negotiation. Pt seen in supine prior to session. Pt initially did not want to participate in session but with encouragement pt more willing pt reported having 10/10 pain in the L hip but has already had pain medication. Pt is very [place] and additional time needed for mobility. Pt able to sit at the EOB with cues provided. Pt able to stand with bed elevated for assistance with use of RW. Pt in standing only stood on this toes on the L LE despite cues of WBAT. Pt able to use urinal in standing with assistance. Pt able to take a few side steps with RW towards the [facility], cues provided for sequencing.  Pt transferred back to bed where pt was left in supine after session, call bell and tray in reach, nurse notified after session. Patient will benefit from skilled intervention to address the above impairments. Patients rehabilitation potential is considered to be Fair  Factors which may influence rehabilitation potential include:   []         None noted  []         Mental ability/status  []         Medical condition  []         Home/family situation and support systems  []         Safety awareness  []         Pain tolerance/management  []         Other:      PLAN :  Recommendations and Planned Interventions:  []           Bed Mobility Training             []    Neuromuscular Re-Education  []           Transfer Training                   []    Orthotic/Prosthetic Training  []           Gait Training                          []    Modalities  []           Therapeutic Exercises          []    Edema Management/Control  []           Therapeutic Activities            []    Patient and Family Training/Education  []           Other (comment):    Frequency/Duration: Patient will be followed by physical therapy 1-2 times per day to address goals. Discharge Recommendations: Shree Wiley  Further Equipment Recommendations for Discharge: N/A     SUBJECTIVE:   Patient stated [facility] I can't get out of this bed today, I am in too much pain.     OBJECTIVE DATA SUMMARY:     Past Medical History:   Diagnosis Date    Brain aneurysm     Cancer ([facility] [address]     prostate    COPD (chronic obstructive pulmonary disease) ([facility] [address]     Hypertension     Nocturia     Pneumonia     Radiation effect      Past Surgical History:   Procedure Laterality Date    HX HERNIA REPAIR       Barriers to Learning/Limitations: yes;  physical  Compensate with: Verbal Cues and Tactile Cues  Prior Level of Function/Home Situation:   Home Situation  Home Environment: Trailer/mobile home  # Steps to Enter: 6  One/Two Story Residence: One story  Living Alone: No  Support Systems: Spouse/Significant Other/Partner  Patient Expects to be Discharged to[de-identified] Skilled nursing facility  Current DME Used/Available at Home: Walker, rolling  Critical Behavior:  Neurologic State: Alert  Orientation Level: Oriented X4  Strength:    Strength: Generally decreased, functional  Tone & Sensation:   Sensation: Intact  Range Of Motion:  AROM: Generally decreased, functional  Functional Mobility:  Bed Mobility:  Supine to Sit: Moderate assistance;Bed Modified  Sit to Supine: Moderate assistance  Scooting: Contact guard assistance  Transfers:  Sit to Stand: Contact guard assistance;Minimum assistance(bed elevated for assistance. )  Stand to Sit: Contact guard assistance;Minimum assistance  Balance:   Sitting: Intact  Standing: Intact; With support  Ambulation/Gait Training:  Distance (ft): 2 Feet (ft)  Assistive Device: Gait belt;Walker, rolling  Ambulation - Level of Assistance: Moderate assistance  Gait Description (WDL): Exceptions to WDL  Gait Abnormalities: Decreased step clearance;Shuffling gait; Step to gait; Toe walking  Left Side Weight Bearing: As tolerated  Base of Support: Shift to right  Stance: Right increased;Time  Speed/[place]: Delayed; Shuffled; Slow  Step Length: Left shortened;Right shortened  Swing Pattern: Left asymmetrical;Right asymmetrical  Therapeutic Exercises: Ankle pumps x 10  Pain:  Pain Scale 1: Numeric (0 - 10)  Pain Intensity 1: 9  Pain Location 1: Hip  Pain Orientation 1: Left  Pain Description 1: Aching  Pain Intervention(s) 1: Medication (see MAR)  Activity Tolerance:   Fair  Please refer to the flowsheet for vital signs taken during this treatment.   After treatment:   []         Patient left in no apparent distress sitting up in chair  [x]         Patient left in no apparent distress in bed  [x]         Call bell left within reach  [x]         Nursing notified  []         Caregiver present  []         Bed alarm activated    COMMUNICATION/EDUCATION:   [x]         Fall prevention education was provided and the patient/caregiver indicated understanding. [x]         Patient/family have participated as able in goal setting and plan of care. [x]         Patient/family agree to work toward stated goals and plan of care. []         Patient understands intent and goals of therapy, but is neutral about his/her participation. []         Patient is unable to participate in goal setting and plan of care.     Thank you for this referral.  Emmanuel Kim, PT   Time Calculation: 32 mins   Eval Complexity: History: HIGH Complexity :3+ comorbidities / personal factors will impact the outcome/ POC Exam:LOW Complexity : 1-2 Standardized tests and measures addressing body structure, function, activity limitation and / or participation in recreation  Presentation: LOW Complexity : Stable, uncomplicated  Clinical Decision Making:Medium Complexity ambulate <30ft  Overall Complexity:MEDIUM

## 2021-04-10 NOTE — PROGRESS NOTES
2040  TRANSFER - IN REPORT:    Verbal report received from Nia Albert RN(name) on Sergio Handley  being received from PACU(unit) for routine progression of care      Report consisted of patients Situation, Background, Assessment and   Recommendations(SBAR). Information from the following report(s) SBAR, Kardex, ED Summary, Intake/Output, MAR, Recent Results and Cardiac Rhythm NSR was reviewed with the receiving nurse. Opportunity for questions and clarification was provided. Assessment completed upon patients arrival to unit and care assumed. 2040  Shift assessment complete  Pt awake and alert; no sign of pain or distress  Hip incision assessed with offgoing nurse; honeycomb and transparent dressing; clean dry and intact     2331  Reassessment complete  Pt resting quietly with eyes closed and chest rising and falling evenly   No signs and symptoms of distress; Hip dressing clean dry and intact  0343  Reassessment complete  Pt resting quietly with eyes closed and chest rising and falling evenly   No signs or symptoms of distress   Hip dressing clean dry and intact     3 Igo Cardiac/Medical Night Shift Chart Audit    Chart Audit completed? YES    Bedside and Verbal shift change report given to Larry Bell (oncoming nurse) by Tsering Wayne RN (offgoing nurse). Report included the following information SBAR, Kardex, ED Summary, Intake/Output, MAR, Recent Results, Med Rec Status and Cardiac Rhythm NSR.

## 2021-04-10 NOTE — PROGRESS NOTES
Pulmonary and Sleep Medicine     Pulmonary/CC Note    Patient: Balaji Oliveros               Sex: male          DOA: 4/5/2021       YOB: 1945      Age:  66 y.o.        LOS:  LOS: 5 days          HPI:   Mr. Balaji Oliveros is a 66 y.o. male who with history of chronic COPD, paroxysmal atrial fibrillation, asbestos pleural disease,. He was recently admitted to the ICU in March 2021 with COPD exacerbation and needed to be intubated. This admission, patient came with left hip pain, and found to have left hip avascular necrosis. He underwent pulmonary preop clearance, and subsequently underwent left hip replacement 4/9/2021.    04/10/21   Chart reviewed; discussed during signout. Patient seen in medical unit. He has gone into acute respiratory distress. Patient sitting by side of bed, on BiPAP. He reports shortness of breath. No hematemesis or cough or hemoptysis. Afebrile; blood pressure good.       Review of Systems:  Ears, nose, mouth, throat, and face: No epistaxis, No nasal drainage, no difficulty in swallowing  Respiratory: as above  Cardiovascular: no chest pain or palpitations, chronic leg edema, no syncope  Gastrointestinal: no abd pain, vomitting or diarrhea, no bleeding symptoms  Genitourinary: No urinary symptoms  Integument/breast: No ulcers  Musculoskeletal: Left hip pains   Neurological: No focal weakness or seizures or headaches  Behvioral/Psych: No anxiety or depression  Constitutional: No fever or chills or weight loss or night sweats       Past Medical History  Past Medical History:   Diagnosis Date    Brain aneurysm     Cancer ([facility] [address]     prostate    COPD (chronic obstructive pulmonary disease) ([facility] [address]     Hypertension     Nocturia     Pneumonia     Radiation effect        Past Surgical History  Past Surgical History:   Procedure Laterality Date    HX HERNIA REPAIR         Family History  Family History   Problem Relation Age of Onset    Heart Disease Mother Social History  Social History     Tobacco Use    Smoking status: Former Smoker     Types: Cigarettes    Smokeless tobacco: Never Used   Substance Use Topics    Alcohol use: No    Drug use: Never        Medications  Current Facility-Administered Medications   Medication Dose Route Frequency    [START ON 4/11/2021] cefTRIAXone (ROCEPHIN) 1 g in sterile water (preservative free) 10 mL IV syringe  1 g IntraVENous ONCE    morphine injection 1 mg  1 mg IntraVENous Q4H PRN    famotidine (PEPCID) tablet 20 mg  20 mg Oral BID    methylPREDNISolone (PF) (Solu-MEDROL) injection 20 mg  20 mg IntraVENous Q12H    sodium chloride (NS) flush 5-40 mL  5-40 mL IntraVENous Q8H    sodium chloride (NS) flush 5-40 mL  5-40 mL IntraVENous PRN    ferrous sulfate tablet 325 mg  1 Tab Oral BID WITH MEALS    HYDROcodone-acetaminophen (NORCO) 7.5-325 mg per tablet 1 Tab  1 Tab Oral Q4H PRN    acetaminophen (TYLENOL) tablet 650 mg  650 mg Oral Q4H PRN    ketorolac (TORADOL) injection 15 mg  15 mg IntraVENous Q6H PRN    naloxone (NARCAN) injection 0.4 mg  0.4 mg IntraVENous PRN    promethazine (PHENERGAN) tablet 25 mg  25 mg Oral Q6H PRN    senna (SENOKOT) tablet 8.6 mg  1 Tab Oral BID    sodium chloride (OCEAN) 0.65 % nasal squeeze bottle 2 Spray  2 Spray Both Nostrils Q2H PRN    ipratropium (ATROVENT) 0.02 % nebulizer solution 0.5 mg  0.5 mg Nebulization QID RT    amLODIPine (NORVASC) tablet 5 mg  5 mg Oral DAILY    tamsulosin (FLOMAX) capsule 0.4 mg  0.4 mg Oral QPM    dilTIAZem IR (CARDIZEM) tablet 30 mg  30 mg Oral TIDAC    furosemide (LASIX) tablet 20 mg  20 mg Oral DAILY    azithromycin (ZITHROMAX) tablet 500 mg  500 mg Oral Q24H    sodium chloride (NS) flush 5-40 mL  5-40 mL IntraVENous Q8H    sodium chloride (NS) flush 5-40 mL  5-40 mL IntraVENous PRN    acetaminophen (TYLENOL) suppository 650 mg  650 mg Rectal Q6H PRN    bisacodyL (DULCOLAX) suppository 10 mg  10 mg Rectal DAILY PRN    promethazine (PHENERGAN) tablet 12.5 mg  12.5 mg Oral Q6H PRN    Or    ondansetron (ZOFRAN) injection 4 mg  4 mg IntraVENous Q6H PRN    enoxaparin (LOVENOX) injection 40 mg  40 mg SubCUTAneous DAILY    budesonide (PULMICORT) 500 mcg/2 ml nebulizer suspension  500 mcg Nebulization BID RT    arformoteroL (BROVANA) neb solution 15 mcg  15 mcg Nebulization BID RT    albuterol-ipratropium (DUO-NEB) 2.5 MG-0.5 MG/3 ML  3 mL Nebulization Q4H PRN     Prior to Admission medications    Medication Sig Start Date End Date Taking? Authorizing Provider   predniSONE (STERAPRED DS) 10 mg dose pack Take 6 tablets p.o. daily x1 day, take 5 tablets p.o. daily x1 day, take 4 tablets p.o. daily x1 day, take 3 tabs p.o. daily x1 day, take 2 tablets p.o. daily x1 day, and take 1 tablet p.o. daily x1 day. 3/2/21   Babita Zapata MD   amLODIPine (NORVASC) 5 mg tablet Take 1 Tab by mouth daily. 3/3/21   Babita Zapata MD   albuterol-ipratropium (DUO-NEB) 2.5 mg-0.5 mg/3 ml nebu 3 mL by Nebulization route every four (4) hours as needed for Wheezing. 2/7/21   Cali Jordan MD   albuterol (PROVENTIL HFA, VENTOLIN HFA, PROAIR HFA) 90 mcg/actuation inhaler Take 2 Puffs by inhalation every four (4) hours as needed for Wheezing or Shortness of Breath. 2/7/21   Cali Jordan MD   OXYGEN-AIR DELIVERY SYSTEMS 2 L/min by Nasal route continuous. Provider, Historical   furosemide (Lasix) 20 mg tablet Take 20 mg by mouth daily. Provider, Historical   dilTIAZem (CARDIZEM) 30 mg tablet Take 1 Tab by mouth Before breakfast, lunch, and dinner. Patient taking differently: Take 30 mg by mouth daily. Daily 4/14/20   Harshal Mireles MD   acetaminophen (TYLENOL) 325 mg tablet Take 650 mg by mouth every eight (8) hours as needed for Pain. Provider, Historical   dextran 70/hypromellose (ARTIFICIAL TEARS, PF, OP) Apply 2 Drops to eye as needed for Other (both eyes for dryness).     Provider, Historical   diphenhydrAMINE (BENADRYL) 25 mg capsule Take 25 mg by mouth nightly as needed for Itching. Provider, Historical   tamsulosin (FLOMAX) 0.4 mg capsule Take 0.4 mg by mouth every evening. Other, MD Blayne       Allergy  Allergies   Allergen Reactions    Aspirin Other (comments)     \"messes my stomach up\"       Physical Exam:   Vital Signs:    Blood pressure 114/69, pulse (!) 105, temperature 97.8 °F (36.6 °C), resp. rate 24, height 5' 7\" (1.702 m), weight 98.6 kg (217 lb 4.8 oz), SpO2 95 %. Body mass index is 34.03 kg/m².     O2 Device: None (Room air)   O2 Flow Rate (L/min): 3 l/min   Temp (24hrs), Av.8 °F (36.6 °C), Min:97.1 °F (36.2 °C), Max:98.6 °F (37 °C)       Intake/Output:   Last shift:      04/10 0701 - 04/10 1900  In: -   Out: 400 [Urine:400]  Last 3 shifts: 1901 - 04/10 07  In: 550 [I.V.:550]  Out: 2825 [Urine:2375]    Intake/Output Summary (Last 24 hours) at 4/10/2021 1448  Last data filed at 4/10/2021 1051  Gross per 24 hour   Intake 550 ml   Output 2025 ml   Net -1475 ml         Physical Exam:   Patient tachypneic; on BiPAP; sitting on side of bed; acyanotic  HEENT: pupils not dilated, reactive, no scleral jaundice  Neck: No adenopathy or thyroid swelling  CVS: S1S2 no murmurs; JVD not elevated; telemetry-sinus rhythm  RS: Moderate to poor air entry bilateral; bilateral wheezing; decreased breath sound at bases; mild bibasal crackles; tachypneic; not in respiratory distress  Abd: soft, non tender, no hepatosplenomegaly, no abd distension, no guarding or rigidity, bowel sounds heard; obese abdomen  Neuro: non focal, awake, alert  Extrm: Mild bilateral pitting leg edema; no focal swelling or clubbing  Skin: no rash  Lymphatic: no cervical or supraclavicular adenopathy  MS: Left hip tenderness; no bleeding or hematoma; surgical dressing noted      Labs Reviewed:  Recent Results (from the past 24 hour(s))   RENAL FUNCTION PANEL    Collection Time: 04/10/21  4:35 AM   Result Value Ref Range    Sodium 136 136 - 145 mmol/L Potassium 3.5 3.5 - 5.5 mmol/L    Chloride 98 (L) 100 - 111 mmol/L    CO2 32 21 - 32 mmol/L    Anion gap 6 3.0 - 18 mmol/L    Glucose 141 (H) 74 - 99 mg/dL    BUN 35 (H) 7.0 - 18 MG/DL    Creatinine 1.16 0.6 - 1.3 MG/DL    BUN/Creatinine ratio 30 (H) 12 - 20      GFR est AA >60 >60 ml/min/1.73m2    GFR est non-AA >60 >60 ml/min/1.73m2    Calcium 7.6 (L) 8.5 - 10.1 MG/DL    Phosphorus 3.3 2.5 - 4.9 MG/DL    Albumin 2.6 (L) 3.4 - 5.0 g/dL   CBC WITH AUTOMATED DIFF    Collection Time: 04/10/21  4:35 AM   Result Value Ref Range    WBC 19.8 (H) 4.6 - 13.2 K/uL    RBC 3.28 (L) 4.35 - 5.65 M/uL    HGB 9.4 (L) 13.0 - 16.0 g/dL    HCT 28.8 (L) 36.0 - 48.0 %    MCV 87.8 74.0 - 97.0 FL    MCH 28.7 24.0 - 34.0 PG    MCHC 32.6 31.0 - 37.0 g/dL    RDW 13.3 11.6 - 14.5 %    PLATELET 727 875 - 484 K/uL    MPV 8.7 (L) 9.2 - 11.8 FL    NEUTROPHILS 91 (H) 40 - 73 %    LYMPHOCYTES 2 (L) 21 - 52 %    MONOCYTES 7 3 - 10 %    EOSINOPHILS 0 0 - 5 %    BASOPHILS 0 0 - 2 %    ABS. NEUTROPHILS 18.0 (H) 1.8 - 8.0 K/UL    ABS. LYMPHOCYTES 0.4 (L) 0.9 - 3.6 K/UL    ABS. MONOCYTES 1.4 (H) 0.05 - 1.2 K/UL    ABS. EOSINOPHILS 0.0 0.0 - 0.4 K/UL    ABS. BASOPHILS 0.0 0.0 - 0.1 K/UL    DF MANUAL      RBC COMMENTS BASOPHILIC STIPPLING  OCCASIONAL        WBC COMMENTS TOXIC GRANULATION     POC G3    Collection Time: 04/10/21  2:15 PM   Result Value Ref Range    Device: BIPAP      FIO2 (POC) 100 %    pH (POC) 7.29 (L) 7.35 - 7.45      pCO2 (POC) 68.2 (H) 35.0 - 45.0 MMHG    pO2 (POC) 447 (H) 80 - 100 MMHG    HCO3 (POC) 32.4 (H) 22 - 26 MMOL/L    sO2 (POC) 100 (H) 92 - 97 %    Base excess (POC) 6 mmol/L    PEEP/CPAP (POC) 7 cmH2O    PIP (POC) 13      Allens test (POC) YES      Bleed In 10 L/min    Total resp.  rate 26      Site RIGHT RADIAL      Specimen type (POC) ARTERIAL      Performed by Gisela Fuentes (Respiratory)            Recent Labs     04/10/21  1415   FIO2I 100   HCO3I 32.4*   PCO2I 68.2*   PHI 7.29*   PO2I 447*       All Micro Results Procedure Component Value Units Date/Time    COVID-19 RAPID TEST [[id number]] Collected: 04/05/21 1539    Order Status: Completed Specimen: Nasopharyngeal Updated: 04/05/21 1616     Specimen source Nasopharyngeal        COVID-19 rapid test Not detected        Comment: Rapid Abbott ID Now       Rapid NAAT:  The specimen is NEGATIVE for SARS-CoV-2, the novel coronavirus associated with COVID-19. Negative results should be treated as presumptive and, if inconsistent with clinical signs and symptoms or necessary for patient management, should be tested with an alternative molecular assay. Negative results do not preclude SARS-CoV-2 infection and should not be used as the sole basis for patient management decisions. This test has been authorized by the FDA under an Emergency Use Authorization (EUA) for use by authorized laboratories. Fact sheet for Healthcare Providers: ConventionUpdate.co.nz  Fact sheet for Patients: ConventionUpdate.co.nz       Methodology: Isothermal Nucleic Acid Amplification                 2/27/21 ECHO   · Left Ventricle: Normal cavity size, wall thickness and systolic function (ejection fraction normal). The estimated EF is 50 - 55%. There is mild (grade 1) left ventricular diastolic dysfunction E'E= 10.05. Poor resolution of endocardial border. Can not comment on wall motion abnormality  · Tricuspid Valve: Tricuspid valve not well visualized. No stenosis. Tricuspid regurgitation is inadequate for estimation of right ventricular systolic pressure      Imaging:  [x]I have personally reviewed the patients chest radiographs images and report with the patient      4/5/21  AP PORTABLE CHEST 4/5/21   Comparison : 02/28/21    FINDINGS: Patient is rotated to the right. The chin obscures the upper thorax. There is mild motion degradation and external artifact which additionally limits visibility. HEART AND MEDIASTINUM: Unremarkable.   LUNGS AND PLEURAL SPACES: Calcified pleural plaques present on the right. Vague  opacity in the right infrahilar region is less evident than the prior, but  degraded by motion. There is stable elevation of the right diaphragm. BONY THORAX AND SOFT TISSUES: No acute osseous abnormality. IMPRESSION  Improved aeration with mild, less evident ill-defined opacity in the right  infrahilar region, which can reflect chronic atelectasis or infiltrate. No  additional change. Results from [place] encounter on 04/05/21   CT HIP LT WO CONT    Narrative ---------------------------------------------------------------------------  <<<<<<<<<           [facility]           >>>>>>>>>   ---------------------------------------------------------------------------    HISTORY:   -From Provider:r/o arias, not candidate for mri  -Additional: None    COMPARISON EXAMINATIONS:   None. TECHNIQUE:   CT of the left hip without intravenous contrast administration. Coronal and sagittal reformats were generated and reviewed. One or more dose reduction techniques were used on this CT: automated exposure  control, adjustment of the mAs and/or kVp according to patient size, and  iterative reconstruction techniques. The specific techniques used on this CT  exam have been documented in the patient's electronic medical record.      --------------------------     FINDINGS    --------------------------    OSSEOUS STRUCTURES:    Left hip: There is irregular subchondral lucency and cortical irregularity with  slight flattening/collapse of the left femoral head. Severe degenerative changes  in the right hip with severe joint space narrowing, subchondral cysts and slight  marginal spurring. Soft tissue thickening about the joint space suggesting hip  joint effusion. Visualized bony pelvis and proximal femur: Degenerative changes in the SI joints  and spine.    Relatively severe central and foraminal stenosis at L4/5 and to  lesser degree at L3/4. Visualized pelvic soft tissues:  LYMPH NODES:  Subcentimeter short axis left groin lymph nodes are present. [ ]  GI TRACT:  Colonic diverticulosis, without CT evidence diverticulitis. Feculent  distention of the rectum. PELVIC ORGANS:  The visualized portion of the bladder is unremarkable. VASCULATURE:  Atherosclerotic calcification in visualized left iliac and femoral  arteries. OTHER:   No ascites or free intraperitoneal air in visualized extent. Fat-containing left inguinal hernia. Fatty change in the gluteal musculature  bilaterally. Subcutaneous stranding/edema along left more than right lateral  flank.      ---------------------------------------------------------------------------    Impression ---------------------------------------------------------------------------    1. Subchondral serpiginous lucencies in the weightbearing left femoral head  suspicious for avascular necrosis, with associated cortical irregularity in  keeping with flattening/collapse. Suspected left hip joint effusion. 2. Relatively severe mild degenerative changes in the left hip. Mild  degenerative changes in the right hip. Degenerative changes in the SI joints and  spine. In the setting of radiculopathy, routine lumbar spine MRI could best  further assess.               PFT 6/29/20 - FEV1 54% [GOLD CLASS 2], FEV1% 58, %, %, DLCO 67% - moderate COPD with hyperinflation and reduced DLCO      IMPRESSION:   · Acute respiratory failure with hypoxemia and hypercapnia  · COPD exacerbation  · Acute pulmonary edema  · Left hip avascular necrosis  · S/p left hip replacement  · Obesity  ·   ·   Patient Active Problem List   Diagnosis Code    Hypertension I10    COPD (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 ([facility]) J44.9    Chronic renal disease, stage 3, moderately decreased glomerular filtration rate between 30-59 mL/min/1.73 square meter ([facility]) N18.30    Acute exacerbation of chronic obstructive pulmonary disease (COPD) ([facility]) J44.1    Debility R53.81    Chronic respiratory failure with hypoxia ([facility]) J96.11    Tobacco dependence F17.200    Left hip pain M25.552    PAF (paroxysmal atrial fibrillation) ([facility]) I48.0    Avascular necrosis of bone of left hip ([facility]) M87.052    Acute respiratory failure with hypoxia and hypercapnia ([facility]) J96.01, J96.02    Obesity E66.9    Acute pulmonary edema ([facility]) J81.0       CODE STATUS-partial code -no CPR or chest compressions; okay for intubation. RECOMMENDATIONS:   Respiratory: Patient has become acutely short of breath; differential diagnosis of COPD exacerbation versus acute pulmonary edema versus thromboembolism. Patient given DuoNeb in the medical unit; Lasix 40 mg 1 dose IV. Chest x-ray stat reviewed-chronic right diaphragm elevation; bilateral emphysematous changes; chronic right pleural calcification seen on prior chest CT. Started on BiPAP-14/7; FiO2 as needed to keep O2 sats greater than 91%. ABG reviewed-shows hypercapnia. Bronchodilators-Brovana and budesonide nebulizer twice daily; ipratropium nebulizer 4 times daily. Solu-Medrol 20 mg every 12 hours; limit steroids due to risk of poor wound healing left hip. Check CTA chest to evaluate for PEs. Transfer to ICU; low threshold for intubation. CVS: Monitor hemodynamics; patient on Cardizem 30 mg 3 times a day; Lasix 40 mg IV 1 dose given. Prior echocardiogram showed normal LV function. ID: Antibiotics-empiric ceftriaxone; complete 7 days. Rapid Covid test negative this admission; SARS-CoV-2 PCR repeated for rehab placement-pending. Renal: Monitor renal function; creatinine improved to 1.16 today. Nephrology on case. GI: No active issues; keep n.p.o. while on BiPAP. Endo: Monitor blood sugars; sliding scale insulin if needed. Neuro: Normal mentation. Hem: Monitor hemoglobin and platelets.   MS: S/p left hip replacement for avascular necrosis; patient complaining of pain and tenderness; plan for CT left hip to evaluate for hematoma. DVT and GI prophylaxis: Enoxaparin and Pepcid. Transfer to ICU pending. Discussed with hospitalist regarding management plans. High complexity review and management; critical care time 45 minutes excluding discussion of procedures. Family discussion-wife [Gloria Howe -[phone]. I have called and discussed with wife; updated management plans; discussed that patient has acute respiratory distress, and coming to the ICU for stabilization and management. Partial CODE STATUS reviewed-wife agrees.            Kimberlee Flores MD

## 2021-04-10 NOTE — PROGRESS NOTES
Problem: Chronic Obstructive Pulmonary Disease (COPD)  Goal: *Oxygen saturation during activity within specified parameters  Outcome: Progressing Towards Goal  Goal: *Able to remain out of bed as prescribed  Outcome: Progressing Towards Goal  Goal: *Absence of hypoxia  Outcome: Progressing Towards Goal  Goal: *Optimize nutritional status  Outcome: Progressing Towards Goal     Problem: Patient Education: Go to Patient Education Activity  Goal: Patient/Family Education  Outcome: Progressing Towards Goal     Problem: Falls - Risk of  Goal: *Absence of Falls  Description: Document Darell Fall Risk and appropriate interventions in the flowsheet.   Outcome: Progressing Towards Goal  Note: Fall Risk Interventions:  Mobility Interventions: Assess mobility with egress test, Communicate number of staff needed for ambulation/transfer, Patient to call before getting OOB, PT Consult for mobility concerns, PT Consult for assist device competence, Utilize walker, cane, or other assistive device         Medication Interventions: Teach patient to arise slowly, Patient to call before getting OOB    Elimination Interventions: Call light in reach, Urinal in reach              Problem: Patient Education: Go to Patient Education Activity  Goal: Patient/Family Education  Outcome: Progressing Towards Goal     Problem: Pain  Goal: *Control of Pain  Outcome: Progressing Towards Goal  Goal: *PALLIATIVE CARE:  Alleviation of Pain  Outcome: Progressing Towards Goal     Problem: Patient Education: Go to Patient Education Activity  Goal: Patient/Family Education  Outcome: Progressing Towards Goal     Problem: Fluid Volume - Risk of, Imbalanced  Goal: *Balanced intake and output  Outcome: Progressing Towards Goal     Problem: Patient Education: Go to Patient Education Activity  Goal: Patient/Family Education  Outcome: Progressing Towards Goal     Problem: Pressure Injury - Risk of  Goal: *Prevention of pressure injury  Description: Document Nikos Scale and appropriate interventions in the flowsheet.   Outcome: Progressing Towards Goal  Note: Pressure Injury Interventions:  Sensory Interventions: Assess changes in LOC, Avoid rigorous massage over bony prominences, Discuss PT/OT consult with provider, Float heels, Keep linens dry and wrinkle-free, Maintain/enhance activity level, Minimize linen layers, Monitor skin under medical devices, Pad between skin to skin, Pressure redistribution bed/mattress (bed type)         Activity Interventions: Pressure redistribution bed/mattress(bed type), Increase time out of bed, PT/OT evaluation    Mobility Interventions: HOB 30 degrees or less, Pressure redistribution bed/mattress (bed type), PT/OT evaluation    Nutrition Interventions: Document food/fluid/supplement intake    Friction and Shear Interventions: HOB 30 degrees or less, Lift sheet, Lift team/patient mobility team, Minimize layers                Problem: Patient Education: Go to Patient Education Activity  Goal: Patient/Family Education  Outcome: Progressing Towards Goal

## 2021-04-10 NOTE — PROGRESS NOTES
Hospitalist Progress Note-critical care note     Patient: Nikos Youngblood MRN: [id number]  CSN: [id number]    YOB: 1945  Age: 66 y.o. Sex: male    DOA: 4/5/2021 LOS:  LOS: 5 days            Chief complaint: left hip pain, paf, hyponatremia.  Debility , copd     Assessment/Plan         Hospital Problems  Date Reviewed: 2/27/2021          Codes Class Noted POA    Avascular necrosis of bone of left hip ([facility] ICD-10-CM: M87.052  ICD-9-CM: 733.42  4/8/2021 Unknown        Left hip pain ICD-10-CM: M25.552  ICD-9-CM: 719.45  4/5/2021 Unknown        PAF (paroxysmal atrial fibrillation) (HCC) ICD-10-CM: I48.0  ICD-9-CM: 427.31  4/5/2021 Unknown        Hypokalemia ICD-10-CM: E87.6  ICD-9-CM: 276.8  4/14/2020 Yes        Hyponatremia ICD-10-CM: E87.1  ICD-9-CM: 276.1  4/10/2020 Yes        Debility ICD-10-CM: R53.81  ICD-9-CM: 799.3  7/8/2019 Yes        * (Principal) Acute exacerbation of chronic obstructive pulmonary disease (COPD) ([facility] ICD-10-CM: J44.1  ICD-9-CM: 491.21  2/8/2019 Unknown        Asbestosis ([facility] ICD-10-CM: L14  ICD-9-CM: 782  10/19/2018 Yes        Hypertension ICD-10-CM: S33  ICD-9-CM: 401.9  Unknown Yes                Patient was admitted due to hyponatremia and hypokalemia and COPD exacerbation.   also needs left hip surgery he post surgery 4/9/21     Acute COPD exacerbation  Continue improving   Iv steroid, breathing tx, empiric abx   Rapid covid 19 undetected   Repeat sent PCR for rehab placement  Off oxygen but not moving      Asbestosis and chronic respiration failure with hypoxia   On home O2 2 L       paf   Continue cardizem   Post op needs theraputic anticoagulation start  Per cardio recommendations      Hypertension: On Cardizem      Hypokalemia and hyponatremia   K  Replacement , Na is 131   Nephrologist f/u with pt      Fluid overloaded-resolving   dvt negative  Lasix per renal   Echo ef 44-58 % mild systolic dysfunction-done in Feb, 2021     Hearing loss   Having hearing aid communication board      Hip pain -AVN -appreciated Dr. Nelson Media f/u with pt  Appreciated ortho/card/pulm on board   Cardiac testing negative but needs ac  Will increase pain meds and check duplex post op       Subjective:   Pain in hip post surgery hungry  States 10/10 despite torodol and hydrocodone  Disposition :tbd,   Review of systems:    General: No fevers or chills. Cardiovascular: No chest pain or pressure. No palpitations. Pulmonary: No shortness of breath. Gastrointestinal: No nausea, vomiting. Msk: left hip pain     Vital signs/Intake and Output:  Visit Vitals  BP (!) 137/47   Pulse 72   Temp 98 °F (36.7 °C)   Resp 18   Ht 5' 7\" (1.702 m)   Wt 98.6 kg (217 lb 4.8 oz)   SpO2 95%   BMI 34.03 kg/m²     Current Shift:  No intake/output data recorded. Last three shifts:  04/08 1901 - 04/10 0700  In: 550 [I.V.:550]  Out: 2825 [Urine:2375]    Physical Exam:  General: WD, WN. Alert, cooperative, no acute distress  [place] grunts on chronic home oxygen of 2liters   HEENT: NC, Atraumatic. PERRLA, anicteric sclerae. Lungs: CTA Bilaterally. +Wheezing/Rhonchi/Rales. Heart:  Regular  rhythm,  No murmur, No Rubs, No Gallops  Abdomen: Soft, Non distended, Non tender. +Bowel sounds,   Extremities: Plus one pitting edema noted  Left hip incision c/di/intact severe ttp swelling noted on thigh   Psych:   Not anxious or agitated.   Neurologic:  Left hip TTP             Labs: Results:       Chemistry Recent Labs     04/10/21  0435 04/09/21  0100 04/08/21  0240   * 140* 145*    134* 131*   K 3.5 3.5 3.5   CL 98* 96* 93*   CO2 32 30 30   BUN 35* 38* 41*   CREA 1.16 1.21 1.27   CA 7.6* 7.9* 7.9*   AGAP 6 8 8   BUCR 30* 31* 32*   ALB 2.6* 2.9* 2.9*      CBC w/Diff Recent Labs     04/10/21  0435 04/09/21  0100 04/08/21  0240   WBC 19.8* 8.6 12.1   RBC 3.28* 3.39* 3.28*   HGB 9.4* 9.6* 9.7*   HCT 28.8* 29.3* 27.9*    332 333   GRANS 91* 86* 91*   LYMPH 2* 5* 4*   EOS 0 0 0      Cardiac Enzymes No results for input(s): CPK, CKND1, WILLARD in the last 72 hours. No lab exists for component: CKRMB, TROIP   Coagulation No results for input(s): PTP, INR, APTT, INREXT, INREXT in the last 72 hours. Lipid Panel No results found for: CHOL, CHOLPOCT, CHOLX, CHLST, CHOLV, 041934, HDL, HDLP, LDL, LDLC, DLDLP, 931164, VLDLC, VLDL, TGLX, TRIGL, TRIGP, TGLPOCT, CHHD, CHHDX   BNP No results for input(s): BNPP in the last 72 hours. Liver Enzymes Recent Labs     04/10/21  0435   ALB 2.6*      Thyroid Studies Lab Results   Component Value Date/Time    TSH 3.06 04/05/2021 01:30 PM        Procedures/imaging: see electronic medical records for all procedures/Xrays and details which were not copied into this note but were reviewed prior to creation of Plan    Xr Hip Lt W Or Wo Pelv 2-3 Vws    Result Date: 4/5/2021  EXAM: LEFT HIP RADIOGRAPHS CLINICAL INDICATION/HISTORY: left hip pain, difficulty ambulating -Additional: None COMPARISON: Supine with 2/26/2021 TECHNIQUE: AP pelvis and frog-leg lateral view of left hip. _______________ FINDINGS: BONES: Severe left hip osteoarthrosis with femoral head remodeling and collapse. No evidence of acute displaced fracture or dislocation. SOFT TISSUES: Unremarkable. _______________     Severe left hip osteoarthrosis with femoral head collapse/remodeling. Sclerosis and lucency in femoral head may reflect collapse/remodeling versus AVN. Consider MRI in the appropriate clinical setting. No evidence of acute fracture or dislocation. Xr Knee Lt Max 2 Vws    Result Date: 4/5/2021  HISTORY: -Provided on order: pain, difficulty ambulating -Additional: None Technique: 2 views of left knee are presented for interpretation. Comparison study: none. Findings: The bones are osteopenic. There is no plain film evident fracture or dislocation. No radiopaque foreign body or significant arthropathy. No significant arthropathy. No plain film evident knee joint effusion is seen.       1. No acute bony abnormality is identified by plain films. Intrinsic knee ligamentous, meniscal and cartilaginous integrity can be best evaluated by routine knee MRI if clinically warranted. Xr Chest Port    Result Date: 4/5/2021  HISTORY: -Provided with order: SOB -Additional: Bilateral lower extremity swelling. Technique : AP PORTABLE CHEST Comparison : 02/28/21 FINDINGS: Patient is rotated to the right. The chin obscures the upper thorax. There is mild motion degradation and external artifact which additionally limits visibility. HEART AND MEDIASTINUM: Unremarkable. LUNGS AND PLEURAL SPACES: Calcified pleural plaques present on the right. Vague opacity in the right infrahilar region is less evident than the prior, but degraded by motion. There is stable elevation of the right diaphragm. BONY THORAX AND SOFT TISSUES: No acute osseous abnormality. Improved aeration with mild, less evident ill-defined opacity in the right infrahilar region, which can reflect chronic atelectasis or infiltrate. No additional change. Duplex Lower Ext Venous Bilat    Result Date: 4/5/2021  · No evidence of deep vein thrombosis in the right lower extremity veins assessed. · No evidence of deep vein thrombosis in the left lower extremity veins assessed.

## 2021-04-10 NOTE — OP NOTES
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 POSTOPERATIVE DIAGNOSIS:  Avascular necrosis of the left femoral head with collapse. PROCEDURE PERFORMED:  Left total hip arthroplasty. SURGEON:  Diogo Ahuja. Lee Ann Kim MD.    ASSISTANT:  TRINA Elizondo.    ANESTHESIA:  spinal.    COMPLICATIONS:  No complications were encountered. SPECIMENS REMOVED:  Femoral head. IMPLANTS:  By Antonietta. ESTIMATED BLOOD LOSS:  150 mL. INDICATIONS:  The patient is a 33-year-old man with multiple comorbidities. Unfortunately, he was not able to ambulate. The pain in his hip was profound. He had no range of motion on examination. Radiographs and CT scan demonstrated heavy involvement of the femoral head with AVN and collapse. The indications, risks, benefits, complications, alternative forms of therapy, and expected outcomes were explained. He seemed to understand and agreed to proceed. PROCEDURE:  The patient was brought to the operating room. Once brought to the operating room, after successful induction of anesthesia, the patient's left lower extremity was prepped and draped with ChloraPrep solution. An anterior approach was made to the hip. The capsule was excised. Retractors were placed anteriorly and posteriorly. The Kaleigh retractor was placed anteriorly. The double osteotomy of the femoral neck was accomplished, the head was extracted. The acetabulum was reamed to accept a 58-mm cluster hole acetabular shell, Trident. The region was irrigated. The cup was impacted into position in 20 degrees of anteversion and 45 degrees of abduction. It was secured with a single 25 x 6.5 mm screw, a 36 mm 0-degree polyethelene insert was impacted into position. Its locking mechanism was confirmed.     The hip was brought into surgical field. It was rasped to accept a size 8 Accolade II stem. The trials were accomplished and the leg length was symmetrical.  The actual size 8, 37 mm x 117, 132 Accolade II stem was then impacted into position. This was configured with the +5 mm neck. The component was stable with a 90-degree and 45-degree shuck test.  The region was irrigated. Hemostasis was maintained. The fascia was repaired using locking suture #2 Ethibond. The subcutaneous tissue was repaired using interrupted sutures of 2-0 Vicryl, and the skin was repaired with Monocryl and Dermabond. Sterile dressings were applied. He tolerated the procedure well and was wheeled to the recovery room in stable condition.       Emely Lr MD [id number]  D:  04/09/2021 19:16  T:  04/10/2021 6:27  JOB #:  [id number]

## 2021-04-10 NOTE — ROUTINE PROCESS
1545  TRANSFER - OUT REPORT:    Verbal report given to Jairo Pablo RN(name) on Sanya Simms  being transferred to ICU(unit) for change in patient condition(SOB, wheezing, needs Bipap)       Report consisted of patients Situation, Background, Assessment and   Recommendations(SBAR). Information from the following report(s) SBAR, Kardex and MAR was reviewed with the receiving nurse. Lines:   Peripheral IV 04/05/21 Left;Distal Forearm (Active)   Site Assessment Clean, dry, & intact 04/10/21 0857   Phlebitis Assessment 0 04/10/21 0857   Infiltration Assessment 0 04/10/21 0857   Dressing Status Clean, dry, & intact 04/10/21 0857   Dressing Type Tape;Transparent 04/10/21 0857   Hub Color/Line Status Pink; Infusing 04/10/21 0857   Action Taken Open ports on tubing capped 04/10/21 0857   Alcohol Cap Used Yes 04/10/21 0857       Peripheral IV 04/08/21 Anterior;Right Hand (Active)   Site Assessment Clean, dry, & intact 04/10/21 0343   Phlebitis Assessment 0 04/10/21 0343   Infiltration Assessment 0 04/10/21 0343   Dressing Status Clean, dry, & intact 04/10/21 0343   Dressing Type Transparent;Tape 04/10/21 0343   Hub Color/Line Status Blue;Capped 04/10/21 0343   Action Taken Open ports on tubing capped 04/10/21 0343   Alcohol Cap Used Yes 04/10/21 0343        Opportunity for questions and clarification was provided. Patient transported with: O2 at 6 l nc   Nurse and RT.

## 2021-04-10 NOTE — PROGRESS NOTES
0750   Pt is awake, alert, oriented x4. Resting in bed. Honey comb dressing to Left hip dry and intact. 0919  Resting in bed. Pain level 10/10 . Medicated with Norco 7.5/325 mg po. Lungs are clear. Abdomen soft with active BS. Not in resp distress. 1000   RN requested diet order from Dr Heather Pena. Ordered Cardiac regular diet. 1045   Pt was seen by PT. Pt was out of bed. Pt did side steps at bedside. Pt voided 400 ml  Via urinal.  1347   Tech in to do Ultrasound but pt became short of breathe and wheezing. Pt has audible expiratory and inspiratory wheezing. RT was paged for Neb tx. Dr Heather Pena was paged. in to see pt.   1420   Pt was placed on Bipap by RT. .  1434  Seen by Dr Manuel Smyth. Port CXR done. Lasix 40 mg IV. Bloods being drawn for BCultures, BMP, CBC with diff, Cardiac Panel. D Dimer STAT. Blood gas drwn by RT. Brigitte Gutierres Pt needs CTA of Chest, Duplex Study of BLEs. Pt for transfer   [id number] 71 79  REport given to  Jennifer Ortiz, CAITLIN in ICU.  9486  Pt still with slight Expiratory and inspiratory wheezes. Breathing at 24/min. O2 sat 100 % on Bipap. 1545  Pt transferred to ICU bed 3 via bed.

## 2021-04-10 NOTE — PROGRESS NOTES
Called for worsening distress  Given oxygen and bipap  Started stat dose of solumedrol given and lasix IV times one   Discussed with icu attending

## 2021-04-11 ENCOUNTER — APPOINTMENT (OUTPATIENT)
Dept: GENERAL RADIOLOGY | Age: 76
DRG: 469 | End: 2021-04-11
Attending: INTERNAL MEDICINE
Payer: MEDICARE

## 2021-04-11 ENCOUNTER — APPOINTMENT (OUTPATIENT)
Dept: CT IMAGING | Age: 76
DRG: 469 | End: 2021-04-11
Attending: INTERNAL MEDICINE
Payer: MEDICARE

## 2021-04-11 PROBLEM — D64.9 ANEMIA: Status: ACTIVE | Noted: 2021-04-11

## 2021-04-11 LAB
ALBUMIN SERPL-MCNC: 2.2 G/DL (ref 3.4–5)
ANION GAP SERPL CALC-SCNC: 8 MMOL/L (ref 3–18)
BASOPHILS # BLD: 0 K/UL (ref 0–0.1)
BASOPHILS NFR BLD: 0 % (ref 0–2)
BUN SERPL-MCNC: 47 MG/DL (ref 7–18)
BUN/CREAT SERPL: 28 (ref 12–20)
CALCIUM SERPL-MCNC: 7.3 MG/DL (ref 8.5–10.1)
CHLORIDE SERPL-SCNC: 98 MMOL/L (ref 100–111)
CO2 SERPL-SCNC: 30 MMOL/L (ref 21–32)
CREAT SERPL-MCNC: 1.65 MG/DL (ref 0.6–1.3)
DIFFERENTIAL METHOD BLD: ABNORMAL
EOSINOPHIL # BLD: 0 K/UL (ref 0–0.4)
EOSINOPHIL NFR BLD: 0 % (ref 0–5)
ERYTHROCYTE [DISTWIDTH] IN BLOOD BY AUTOMATED COUNT: 13.3 % (ref 11.6–14.5)
GLUCOSE BLD STRIP.AUTO-MCNC: 163 MG/DL (ref 70–110)
GLUCOSE BLD STRIP.AUTO-MCNC: 171 MG/DL (ref 70–110)
GLUCOSE BLD STRIP.AUTO-MCNC: 176 MG/DL (ref 70–110)
GLUCOSE SERPL-MCNC: 147 MG/DL (ref 74–99)
HCT VFR BLD AUTO: 20.9 % (ref 36–48)
HCT VFR BLD AUTO: 23.5 % (ref 36–48)
HCT VFR BLD AUTO: 24.2 % (ref 36–48)
HGB BLD-MCNC: 7 G/DL (ref 13–16)
HGB BLD-MCNC: 7.9 G/DL (ref 13–16)
HGB BLD-MCNC: 8.1 G/DL (ref 13–16)
HISTORY CHECKED?,CKHIST: NORMAL
LYMPHOCYTES # BLD: 0.6 K/UL (ref 0.9–3.6)
LYMPHOCYTES NFR BLD: 3 % (ref 21–52)
MCH RBC QN AUTO: 29.1 PG (ref 24–34)
MCHC RBC AUTO-ENTMCNC: 33.5 G/DL (ref 31–37)
MCV RBC AUTO: 87.1 FL (ref 74–97)
MONOCYTES # BLD: 1 K/UL (ref 0.05–1.2)
MONOCYTES NFR BLD: 5 % (ref 3–10)
NEUTS SEG # BLD: 17.4 K/UL (ref 1.8–8)
NEUTS SEG NFR BLD: 89 % (ref 40–73)
PHOSPHATE SERPL-MCNC: 3.6 MG/DL (ref 2.5–4.9)
PLATELET # BLD AUTO: 242 K/UL (ref 135–420)
PMV BLD AUTO: 9 FL (ref 9.2–11.8)
POTASSIUM SERPL-SCNC: 3.9 MMOL/L (ref 3.5–5.5)
RBC # BLD AUTO: 2.78 M/UL (ref 4.35–5.65)
SODIUM SERPL-SCNC: 136 MMOL/L (ref 136–145)
WBC # BLD AUTO: 19.6 K/UL (ref 4.6–13.2)

## 2021-04-11 PROCEDURE — 80069 RENAL FUNCTION PANEL: CPT

## 2021-04-11 PROCEDURE — 74011250636 HC RX REV CODE- 250/636: Performed by: INTERNAL MEDICINE

## 2021-04-11 PROCEDURE — 85014 HEMATOCRIT: CPT

## 2021-04-11 PROCEDURE — 82962 GLUCOSE BLOOD TEST: CPT

## 2021-04-11 PROCEDURE — 97110 THERAPEUTIC EXERCISES: CPT

## 2021-04-11 PROCEDURE — 85025 COMPLETE CBC W/AUTO DIFF WBC: CPT

## 2021-04-11 PROCEDURE — 77030027138 HC INCENT SPIROMETER -A

## 2021-04-11 PROCEDURE — 74011250637 HC RX REV CODE- 250/637: Performed by: PHYSICIAN ASSISTANT

## 2021-04-11 PROCEDURE — 97530 THERAPEUTIC ACTIVITIES: CPT

## 2021-04-11 PROCEDURE — 71275 CT ANGIOGRAPHY CHEST: CPT

## 2021-04-11 PROCEDURE — 94640 AIRWAY INHALATION TREATMENT: CPT

## 2021-04-11 PROCEDURE — 74011000636 HC RX REV CODE- 636: Performed by: HOSPITALIST

## 2021-04-11 PROCEDURE — 73502 X-RAY EXAM HIP UNI 2-3 VIEWS: CPT

## 2021-04-11 PROCEDURE — 74011250636 HC RX REV CODE- 250/636: Performed by: PHYSICIAN ASSISTANT

## 2021-04-11 PROCEDURE — 74011000250 HC RX REV CODE- 250: Performed by: INTERNAL MEDICINE

## 2021-04-11 PROCEDURE — 74011250637 HC RX REV CODE- 250/637: Performed by: HOSPITALIST

## 2021-04-11 PROCEDURE — 74011000250 HC RX REV CODE- 250: Performed by: HOSPITALIST

## 2021-04-11 PROCEDURE — 65610000006 HC RM INTENSIVE CARE

## 2021-04-11 PROCEDURE — 74011250637 HC RX REV CODE- 250/637: Performed by: INTERNAL MEDICINE

## 2021-04-11 PROCEDURE — 74011250636 HC RX REV CODE- 250/636: Performed by: HOSPITALIST

## 2021-04-11 PROCEDURE — 74011000258 HC RX REV CODE- 258: Performed by: INTERNAL MEDICINE

## 2021-04-11 PROCEDURE — 97116 GAIT TRAINING THERAPY: CPT

## 2021-04-11 PROCEDURE — 36430 TRANSFUSION BLD/BLD COMPNT: CPT

## 2021-04-11 PROCEDURE — 74011636637 HC RX REV CODE- 636/637: Performed by: INTERNAL MEDICINE

## 2021-04-11 PROCEDURE — 72193 CT PELVIS W/DYE: CPT

## 2021-04-11 PROCEDURE — P9016 RBC LEUKOCYTES REDUCED: HCPCS

## 2021-04-11 PROCEDURE — 30233N1 TRANSFUSION OF NONAUTOLOGOUS RED BLOOD CELLS INTO PERIPHERAL VEIN, PERCUTANEOUS APPROACH: ICD-10-PCS | Performed by: HOSPITALIST

## 2021-04-11 PROCEDURE — 36415 COLL VENOUS BLD VENIPUNCTURE: CPT

## 2021-04-11 PROCEDURE — 77010033678 HC OXYGEN DAILY

## 2021-04-11 RX ORDER — CALCIUM GLUCONATE 20 MG/ML
1 INJECTION, SOLUTION INTRAVENOUS ONCE
Status: COMPLETED | OUTPATIENT
Start: 2021-04-11 | End: 2021-04-11

## 2021-04-11 RX ORDER — DEXTROSE MONOHYDRATE 100 MG/ML
125-250 INJECTION, SOLUTION INTRAVENOUS AS NEEDED
Status: DISCONTINUED | OUTPATIENT
Start: 2021-04-11 | End: 2021-04-14 | Stop reason: HOSPADM

## 2021-04-11 RX ORDER — INSULIN LISPRO 100 [IU]/ML
INJECTION, SOLUTION INTRAVENOUS; SUBCUTANEOUS
Status: DISCONTINUED | OUTPATIENT
Start: 2021-04-11 | End: 2021-04-14 | Stop reason: HOSPADM

## 2021-04-11 RX ORDER — FUROSEMIDE 10 MG/ML
20 INJECTION INTRAMUSCULAR; INTRAVENOUS EVERY 24 HOURS
Status: DISCONTINUED | OUTPATIENT
Start: 2021-04-11 | End: 2021-04-14 | Stop reason: HOSPADM

## 2021-04-11 RX ORDER — LORAZEPAM 2 MG/ML
0.5 INJECTION INTRAMUSCULAR ONCE
Status: COMPLETED | OUTPATIENT
Start: 2021-04-11 | End: 2021-04-11

## 2021-04-11 RX ORDER — VANCOMYCIN/0.9 % SOD CHLORIDE 1.5G/250ML
1500 PLASTIC BAG, INJECTION (ML) INTRAVENOUS EVERY 24 HOURS
Status: DISCONTINUED | OUTPATIENT
Start: 2021-04-11 | End: 2021-04-14 | Stop reason: HOSPADM

## 2021-04-11 RX ORDER — FUROSEMIDE 10 MG/ML
20 INJECTION INTRAMUSCULAR; INTRAVENOUS ONCE
Status: DISCONTINUED | OUTPATIENT
Start: 2021-04-11 | End: 2021-04-11

## 2021-04-11 RX ORDER — MAGNESIUM SULFATE 100 %
4 CRYSTALS MISCELLANEOUS AS NEEDED
Status: DISCONTINUED | OUTPATIENT
Start: 2021-04-11 | End: 2021-04-14 | Stop reason: HOSPADM

## 2021-04-11 RX ORDER — SODIUM CHLORIDE 9 MG/ML
250 INJECTION, SOLUTION INTRAVENOUS AS NEEDED
Status: DISCONTINUED | OUTPATIENT
Start: 2021-04-11 | End: 2021-04-14 | Stop reason: HOSPADM

## 2021-04-11 RX ORDER — CALCIUM GLUCONATE 20 MG/ML
1 INJECTION, SOLUTION INTRAVENOUS ONCE
Status: DISCONTINUED | OUTPATIENT
Start: 2021-04-11 | End: 2021-04-11

## 2021-04-11 RX ADMIN — KETOROLAC TROMETHAMINE 15 MG: 30 INJECTION, SOLUTION INTRAMUSCULAR at 08:02

## 2021-04-11 RX ADMIN — IOPAMIDOL 100 ML: 755 INJECTION, SOLUTION INTRAVENOUS at 09:16

## 2021-04-11 RX ADMIN — ARFORMOTEROL TARTRATE 15 MCG: 15 SOLUTION RESPIRATORY (INHALATION) at 19:36

## 2021-04-11 RX ADMIN — SENNOSIDES 8.6 MG: 8.6 TABLET, FILM COATED ORAL at 08:02

## 2021-04-11 RX ADMIN — DILTIAZEM HYDROCHLORIDE 30 MG: 30 TABLET, FILM COATED ORAL at 08:02

## 2021-04-11 RX ADMIN — METHYLPREDNISOLONE SODIUM SUCCINATE 20 MG: 40 INJECTION, POWDER, FOR SOLUTION INTRAMUSCULAR; INTRAVENOUS at 08:02

## 2021-04-11 RX ADMIN — FERROUS SULFATE TAB 325 MG (65 MG ELEMENTAL FE) 325 MG: 325 (65 FE) TAB at 08:02

## 2021-04-11 RX ADMIN — FAMOTIDINE 20 MG: 20 TABLET, FILM COATED ORAL at 08:02

## 2021-04-11 RX ADMIN — ENOXAPARIN SODIUM 40 MG: 40 INJECTION SUBCUTANEOUS at 08:03

## 2021-04-11 RX ADMIN — DILTIAZEM HYDROCHLORIDE 30 MG: 30 TABLET, FILM COATED ORAL at 17:02

## 2021-04-11 RX ADMIN — PIPERACILLIN SODIUM AND TAZOBACTAM SODIUM 3.38 G: 3; .375 INJECTION, POWDER, LYOPHILIZED, FOR SOLUTION INTRAVENOUS at 11:09

## 2021-04-11 RX ADMIN — INSULIN LISPRO 2 UNITS: 100 INJECTION, SOLUTION INTRAVENOUS; SUBCUTANEOUS at 17:03

## 2021-04-11 RX ADMIN — DILTIAZEM HYDROCHLORIDE 30 MG: 30 TABLET, FILM COATED ORAL at 11:09

## 2021-04-11 RX ADMIN — METHYLPREDNISOLONE SODIUM SUCCINATE 20 MG: 40 INJECTION, POWDER, FOR SOLUTION INTRAMUSCULAR; INTRAVENOUS at 13:16

## 2021-04-11 RX ADMIN — FUROSEMIDE 20 MG: 10 INJECTION, SOLUTION INTRAMUSCULAR; INTRAVENOUS at 11:09

## 2021-04-11 RX ADMIN — PIPERACILLIN SODIUM AND TAZOBACTAM SODIUM 3.38 G: 3; .375 INJECTION, POWDER, LYOPHILIZED, FOR SOLUTION INTRAVENOUS at 17:02

## 2021-04-11 RX ADMIN — BUDESONIDE 500 MCG: 0.5 INHALANT RESPIRATORY (INHALATION) at 07:28

## 2021-04-11 RX ADMIN — METHYLPREDNISOLONE SODIUM SUCCINATE 20 MG: 40 INJECTION, POWDER, FOR SOLUTION INTRAMUSCULAR; INTRAVENOUS at 20:45

## 2021-04-11 RX ADMIN — SENNOSIDES 8.6 MG: 8.6 TABLET, FILM COATED ORAL at 20:45

## 2021-04-11 RX ADMIN — ARFORMOTEROL TARTRATE 15 MCG: 15 SOLUTION RESPIRATORY (INHALATION) at 07:28

## 2021-04-11 RX ADMIN — FERROUS SULFATE TAB 325 MG (65 MG ELEMENTAL FE) 325 MG: 325 (65 FE) TAB at 17:02

## 2021-04-11 RX ADMIN — IPRATROPIUM BROMIDE 0.5 MG: 0.5 SOLUTION RESPIRATORY (INHALATION) at 07:28

## 2021-04-11 RX ADMIN — IPRATROPIUM BROMIDE 0.5 MG: 0.5 SOLUTION RESPIRATORY (INHALATION) at 11:00

## 2021-04-11 RX ADMIN — HYDROCODONE BITARTRATE AND ACETAMINOPHEN 1 TABLET: 7.5; 325 TABLET ORAL at 08:02

## 2021-04-11 RX ADMIN — IPRATROPIUM BROMIDE 0.5 MG: 0.5 SOLUTION RESPIRATORY (INHALATION) at 19:36

## 2021-04-11 RX ADMIN — TAMSULOSIN HYDROCHLORIDE 0.4 MG: 0.4 CAPSULE ORAL at 17:02

## 2021-04-11 RX ADMIN — INSULIN LISPRO 2 UNITS: 100 INJECTION, SOLUTION INTRAVENOUS; SUBCUTANEOUS at 13:16

## 2021-04-11 RX ADMIN — CALCIUM GLUCONATE 1000 MG: 20 INJECTION, SOLUTION INTRAVENOUS at 13:16

## 2021-04-11 RX ADMIN — VANCOMYCIN HYDROCHLORIDE 1500 MG: 10 INJECTION, POWDER, LYOPHILIZED, FOR SOLUTION INTRAVENOUS at 11:09

## 2021-04-11 RX ADMIN — FAMOTIDINE 20 MG: 20 TABLET, FILM COATED ORAL at 20:45

## 2021-04-11 RX ADMIN — IPRATROPIUM BROMIDE 0.5 MG: 0.5 SOLUTION RESPIRATORY (INHALATION) at 15:52

## 2021-04-11 RX ADMIN — LORAZEPAM 0.5 MG: 2 INJECTION INTRAMUSCULAR; INTRAVENOUS at 09:09

## 2021-04-11 RX ADMIN — BUDESONIDE 500 MCG: 0.5 INHALANT RESPIRATORY (INHALATION) at 19:37

## 2021-04-11 RX ADMIN — FUROSEMIDE 20 MG: 20 TABLET ORAL at 08:02

## 2021-04-11 RX ADMIN — AMLODIPINE BESYLATE 5 MG: 5 TABLET ORAL at 08:02

## 2021-04-11 NOTE — PROGRESS NOTES
Pulm/CC    CT left hip  IMPRESSION  1. Recent left hip arthroplasty with no fracture or dislocation identified. Soft  tissue swelling, hematoma, and gas identified around the left hip from the  recent surgery. CTA chest  IMPRESSION  1. No pulmonary embolism identified. 2. Chronic right-sided pleural calcifications and chronic pleural collections  which are unchanged compared to prior exams. No new focal consolidation. Scattered atelectasis and/or scarring in the lungs. Ref. Range 4/11/2021 11:45   HGB Latest Ref Range: 13.0 - 16.0 g/dL 7.0 (L)   HCT Latest Ref Range: 36.0 - 48.0 % 20.9 (L)        Impression  Anemia postop. Plan  1 unit PRBC.     Edgar Bell MD

## 2021-04-11 NOTE — PROGRESS NOTES
Vancomycin - Pharmacy to Dose    Consult provided for this 66y.o. year old ,male for indication of Skin and Soft Tissue Infection - 7 day Tx. Therapy day 1        Wt Readings from Last 1 Encounters:   04/09/21 98.6 kg (217 lb 4.8 oz)       Ht Readings from Last 1 Encounters:   04/08/21 170.2 cm (67\")     Dosing Weight: 98.6 kg    Additional Antibiotics:  Zosyn    Date:  4/11/21   Lab Results   Component Value Date/Time    Creatinine 1.65 (H) 04/11/2021 04:05 AM    Creatinine, POC 1.3 10/30/2019 03:27 PM     creatinine clearance:  41.9 ml/min    white blood cell count:  19.6    Will dose Vancomycin 1500 mg IV q24hrs x 7 days. Trough after 3-4 doses infused. Dose calculated to approximate a therapeutic trough of 15-20 mcg/mL. Pharmacy to follow daily and will make changes to dose and/or frequency based on clinical status.       [address] [facility], [address] [place] [address]

## 2021-04-11 NOTE — PROGRESS NOTES
Subjective:     Post-Operative Day: 2   Status Post left Hip replacement   Systemic or Specific Complaints:No Complaints    Objective:     Patient Vitals for the past 24 hrs:   BP Temp Pulse Resp SpO2   04/11/21 1058 -- -- -- -- 95 %   04/11/21 0800 -- -- 73 -- --   04/11/21 0732 -- -- -- -- 99 %   04/11/21 0706 -- 97.9 °F (36.6 °C) -- -- --   04/11/21 0530 (!) 135/56 -- 73 20 99 %   04/11/21 0500 (!) 110/49 -- 63 21 96 %   04/11/21 0430 (!) 121/58 -- 72 20 98 %   04/11/21 0400 (!) 108/41 -- 63 19 100 %   04/11/21 0330 (!) 122/59 -- 74 19 98 %   04/11/21 0300 (!) 120/51 -- 63 20 99 %   04/11/21 0230 (!) 105/50 -- 66 19 97 %   04/11/21 0200 (!) 107/49 -- 65 20 97 %   04/11/21 0100 (!) 111/56 -- 72 18 98 %   04/11/21 0030 (!) 102/50 -- 67 19 97 %   04/11/21 0000 (!) 108/45 -- 65 19 99 %   04/10/21 2330 (!) 109/46 -- 65 17 98 %   04/10/21 2300 (!) 106/48 -- 66 18 97 %   04/10/21 2230 (!) 95/46 -- 66 19 96 %   04/10/21 2200 (!) 102/46 -- 66 18 97 %   04/10/21 2130 (!) 95/45 -- 68 19 97 %   04/10/21 2100 (!) 111/52 -- 75 19 97 %   04/10/21 2035 -- -- -- -- 97 %   04/10/21 2030 (!) 100/56 -- 72 20 95 %   04/10/21 2000 (!) 110/51 97.8 °F (36.6 °C) 74 20 96 %   04/10/21 1930 (!) 112/55 -- 76 20 97 %   04/10/21 1900 (!) 111/50 -- 76 20 97 %   04/10/21 1800 (!) 105/59 -- 90 18 98 %   04/10/21 1745 -- -- 92 21 98 %   04/10/21 1730 (!) 116/56 -- 97 25 98 %   04/10/21 1700 105/61 -- 89 22 98 %   04/10/21 1630 (!) 92/55 -- 95 20 97 %   04/10/21 1503 -- -- -- -- 100 %   04/10/21 1452 (!) 112/53 -- (!) 113 24 100 %   04/10/21 1343 114/69 -- (!) 105 24 95 %       General: alert, cooperative, no distress   Dressing: Clean and dry   Neurovascular: Grossly intact distally   Hip Exam: There is a blister proximally w/ underlying ecchymosis. . Dressing is intact. Painless PROM of flexion IR/ER and abduction . Veena Fernandez was not particularly TTP and soft to papation   DVT Exam: No evidence of DVT seen on physical exam.     Data Review      Labs: Results:       Chemistry Recent Labs     04/11/21  0405 04/10/21  1440 04/10/21  0435 04/09/21  0100   * 183* 141* 140*    137 136 134*   K 3.9 3.8 3.5 3.5   CL 98* 98* 98* 96*   CO2 30 31 32 30   BUN 47* 39* 35* 38*   CREA 1.65* 1.59* 1.16 1.21   CA 7.3* 7.7* 7.6* 7.9*   AGAP 8 8 6 8      CBC w/Diff Recent Labs     04/11/21  1145 04/11/21  0405 04/10/21  1440 04/10/21  0435 04/09/21  0100   WBC  --  19.6* 26.2* 19.8* 8.6   RBC  --  2.78* 3.47* 3.28* 3.39*   HGB 7.0* 8.1* 10.1* 9.4* 9.6*   HCT 20.9* 24.2* 30.7* 28.8* 29.3*   PLT  --  242 430* 344 332      Coagulation            Assessment:     Status Post  Hip replacement. Doing well postoperatively\ as can be expected. Transferred to the ICU due to respiratory distress. Resolved . . communicated to MR Park the need to ambulate ASAP which is possible. Post-op anemia. .his Hgb was below 10 in the recent past.. not symptomatic from the anemia. . breathing via NC on 3 Ls. Plan: Mobilize with PT. . needs to ambulate ASAP  DVT Prophylaxis. Lovenox  Pain control. advised to avoid narcotics. . Toradol x 5 dosages. Dafne Jo

## 2021-04-11 NOTE — PROGRESS NOTES
Patient does not wish to use bipap, patient is in no distress and is speaking in complete sentences.

## 2021-04-11 NOTE — PROGRESS NOTES
Hospitalist Progress Note    Patient: Danya Finch MRN: [id number]  CSN: [id number]    YOB: 1945  Age: 66 y.o. Sex: male    DOA: 4/5/2021 LOS:  LOS: 6 days          Chief Complaint:    Transferred to unit  for acute respiratory distress  Now off of bipap likely combo of fluid overload and copd   Discussed case with Intensivist and Ortho      Assessment/Plan     Cv:  Fluid overload /afib  Needs post op AC once per cardio  IV lasix prn   Recent cardiac work up ok    Pulm  Acute hypxoichypercapniec  Respiratory Failuure   Copd/on home oxygen with Asbestosis recent intubation   Cont steroids/now on zosyn for excerbation  CTA pending     Ortho  Post hip replacement  Duplex leg neg for DVT  Resume PT  Per ortho  Stopping all narcotics   New blistering checking xrays  Ortho input appreciated   Prefers less narcotics if possible     Fen  On Icu protocol  Hx of low sodium and potassium    ID:  Zosyn Copd excerb  Leukocytosis? Steroids  ? Stress rx ?  Hip infectin CT and xray of hip    GI  Mild drop in hemoglobin  Pepcid for protection  Likely with hematoma of hip    Heme  DVT prevention Lovenox on hold till xrays done and see by orhto   Monitor cbc and platlets  Needs theraputic NOAC for afib  Hematoma post op monitor counts  Transfuse rbc if less than 7 or symptomatic   Patient Active Problem List   Diagnosis Code    Hypertension I10    COPD (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 ([facility]) J44.9    Chronic renal disease, stage 3, moderately decreased glomerular filtration rate between 30-59 mL/min/1.73 square meter ([facility]) N18.30    Acute exacerbation of chronic obstructive pulmonary disease (COPD) ([address] J44.1    Debility R53.81    Chronic respiratory failure with hypoxia ([facility]) J96.11    Tobacco dependence F17.200    Left hip pain M25.552    PAF (paroxysmal atrial fibrillation) ([facility]) I48.0    Avascular necrosis of bone of left hip ([facility]) M87.052    Acute respiratory failure with hypoxia and hypercapnia (HCC) J96.01, J96.02    Obesity E66.9    Acute pulmonary edema (HCC) J81.0       Subjective:        Review of systems:    Constitutional: denies fevers, chills, myalgias  Respiratory: +s SOB, cough  Cardiovascular: denies chest pain, palpitations  Gastrointestinal: denies nausea, vomiting, diarrhea      Vital signs/Intake and Output:  Visit Vitals  BP (!) 135/56   Pulse 73   Temp 97.9 °F (36.6 °C)   Resp 20   Ht 5' 7\" (1.702 m)   Wt 98.6 kg (217 lb 4.8 oz)   SpO2 99%   BMI 34.03 kg/m²     Current Shift:  No intake/output data recorded.   Last three shifts:  04/09 1901 - 04/11 0700  In: 550 [I.V.:550]  Out: 1250 [Urine:800]    Exam:    General: Well developed, alert, NAD, OX3 [place]   Head/Neck: NCAT, supple, No masses, No lymphadenopathy  CVS:Regular rate a nd rhythm, no M/R/G, S1/S2 heard, no thrill  Lungs:diminished through out   Abdomen: Soft, Nontender, No distention, Normal Bowel sounds, No hepatomegaly  Extremities:left hip pain tender swelling noted  inision c//d/i blistering noted not present yesterday   Skin:normal texture and turgor, no rashes, no lesions  Neuro:grossly normal , follows commands  Psych:appropriate                Labs: Results:       Chemistry Recent Labs     04/11/21  0405 04/10/21  1440 04/10/21  0435   * 183* 141*    137 136   K 3.9 3.8 3.5   CL 98* 98* 98*   CO2 30 31 32   BUN 47* 39* 35*   CREA 1.65* 1.59* 1.16   CA 7.3* 7.7* 7.6*   AGAP 8 8 6   BUCR 28* 25* 30*   AP  --  68  --    TP  --  5.6*  --    ALB 2.2* 2.9* 2.6*   GLOB  --  2.7  --    AGRAT  --  1.1  --       CBC w/Diff Recent Labs     04/11/21  0405 04/10/21  1440 04/10/21  0435   WBC 19.6* 26.2* 19.8*   RBC 2.78* 3.47* 3.28*   HGB 8.1* 10.1* 9.4*   HCT 24.2* 30.7* 28.8*    430* 344   GRANS 89* 88* 91*   LYMPH 3* 5* 2*   EOS 0 0 0      Cardiac Enzymes Recent Labs     04/10/21  2005 04/10/21  1440    113   CKND1 2.5 1.6      Coagulation No results for input(s): PTP, INR, APTT, INREXT in the last 72 hours. Lipid Panel No results found for: CHOL, CHOLPOCT, CHOLX, CHLST, CHOLV, 909638, HDL, HDLP, LDL, LDLC, DLDLP, 179101, VLDLC, VLDL, TGLX, TRIGL, TRIGP, TGLPOCT, CHHD, CHHDX   BNP No results for input(s): BNPP in the last 72 hours.    Liver Enzymes Recent Labs     04/11/21  0405 04/10/21  1440   TP  --  5.6*   ALB 2.2* 2.9*   AP  --  68      Thyroid Studies Lab Results   Component Value Date/Time    TSH 3.06 04/05/2021 01:30 PM        Procedures/imaging: see electronic medical records for all procedures/Xrays and details which were not copied into this note but were reviewed prior to creation of Levi Shen MD

## 2021-04-11 NOTE — PROGRESS NOTES
Zosyn (Piperacillin/Tazobactam) Extended Infusion    Gilberto Abebe, a 66 y.o. yo male, has been converted to an extended infusion of Zosyn while in the intensive care unit. A loading dose of 3.375 or 4.5 gm will be given over 30 minutes depending on indication. Extended infusions will begin 4 hours after the initial dose if CrCl  >/= 20 ml/min or 8 hours after the initial dose if CrCl < 20 ml/min. Extended infusions will run over 4 hours (240 minutes).     Recent Labs     04/11/21  0405 04/10/21  1440 04/10/21  0435   CREA 1.65* 1.59* 1.16     Ht Readings from Last 1 Encounters:   04/08/21 170.2 cm (67\")     Wt Readings from Last 1 Encounters:   04/09/21 98.6 kg (217 lb 4.8 oz)       CrCl : Serum creatinine: 1.65 mg/dL (H) 04/11/21 0405  Estimated creatinine clearance: 41.9 mL/min (A)    Renal adjustment of extended infusion of Zosyn  3.375 or 4.5 gm every 8 hours for CrCl >/= 20 ml/min  3.375 or 4.5 gm every 12 hours for CrCl < 20 ml/min, intermittent HD or PD

## 2021-04-11 NOTE — PROGRESS NOTES
DEVICE INTERROGATION    Device Type: [facility] Pulmonary and Sleep Medicine     Pulmonary/CC Note    Patient: Beba You               Sex: male          DOA: 4/5/2021       YOB: 1945      Age:  66 y.o.        LOS:  LOS: 6 days          HPI:   Mr. Beba You is a 66 y.o. male who with history of chronic COPD, paroxysmal atrial fibrillation, asbestos pleural disease,. He was recently admitted to the ICU in March 2021 with COPD exacerbation and needed to be intubated. This admission, patient came with left hip pain, and found to have left hip avascular necrosis. He underwent pulmonary preop clearance, and subsequently underwent left hip replacement 4/9/2021.    04/11/21   Patient moved to ICU 4/10 due to respiratory distress, and left hip pain and swelling. The patient remains in ICU. He is awake and alert. Breathing seems to have improved; on nasal cannula oxygen. Patient complaining of severe pain left hip; nurse reports swelling left hip. Occasional cough-nonproductive. No chest pain or palpitations. Afebrile; blood pressure good. Telemetry-sinus rhythm. Urine output-400 mL overnight. Patient does not have Humphreys cath.       Review of Systems:   Ears, nose, mouth, throat, and face: No epistaxis, No nasal drainage, no difficulty in swallowing  Respiratory: as above  Cardiovascular: no chest pain or palpitations, chronic leg edema, no syncope  Gastrointestinal: no abd pain, vomitting or diarrhea, no bleeding symptoms  Genitourinary: No urinary symptoms  Integument/breast: No ulcers  Musculoskeletal: Left hip pains and swelling  Neurological: No focal weakness or seizures or headaches  Constitutional: No fever or chills       Past Medical History  Past Medical History:   Diagnosis Date    Brain aneurysm     Cancer [facility] [address]     prostate    COPD (chronic obstructive pulmonary disease) [facility] [address]     Hypertension     Nocturia     Pneumonia     Radiation effect        Past Surgical History  Past Surgical History:   Procedure Laterality Date    HX HERNIA REPAIR         Family History  Family History   Problem Relation Age of Onset    Heart Disease Mother        Social History  Social History     Tobacco Use    Smoking status: Former Smoker     Types: Cigarettes    Smokeless tobacco: Never Used   Substance Use Topics    Alcohol use: No    Drug use: Never        Medications  Current Facility-Administered Medications   Medication Dose Route Frequency    cefTRIAXone (ROCEPHIN) 1 g in sterile water (preservative free) 10 mL IV syringe  1 g IntraVENous ONCE    morphine injection 1 mg  1 mg IntraVENous Q4H PRN    budesonide (PULMICORT) 0.5 mg/2 mL nebulizer suspension        famotidine (PEPCID) tablet 20 mg  20 mg Oral BID    methylPREDNISolone (PF) (Solu-MEDROL) injection 20 mg  20 mg IntraVENous Q12H    sodium chloride (NS) flush 5-40 mL  5-40 mL IntraVENous Q8H    sodium chloride (NS) flush 5-40 mL  5-40 mL IntraVENous PRN    ferrous sulfate tablet 325 mg  1 Tab Oral BID WITH MEALS    HYDROcodone-acetaminophen (NORCO) 7.5-325 mg per tablet 1 Tab  1 Tab Oral Q4H PRN    acetaminophen (TYLENOL) tablet 650 mg  650 mg Oral Q4H PRN    ketorolac (TORADOL) injection 15 mg  15 mg IntraVENous Q6H PRN    naloxone (NARCAN) injection 0.4 mg  0.4 mg IntraVENous PRN    promethazine (PHENERGAN) tablet 25 mg  25 mg Oral Q6H PRN    senna (SENOKOT) tablet 8.6 mg  1 Tab Oral BID    sodium chloride (OCEAN) 0.65 % nasal squeeze bottle 2 Spray  2 Spray Both Nostrils Q2H PRN    ipratropium (ATROVENT) 0.02 % nebulizer solution 0.5 mg  0.5 mg Nebulization QID RT    amLODIPine (NORVASC) tablet 5 mg  5 mg Oral DAILY    tamsulosin (FLOMAX) capsule 0.4 mg  0.4 mg Oral QPM    dilTIAZem IR (CARDIZEM) tablet 30 mg  30 mg Oral TIDAC    furosemide (LASIX) tablet 20 mg  20 mg Oral DAILY    sodium chloride (NS) flush 5-40 mL  5-40 mL IntraVENous Q8H    sodium chloride (NS) flush 5-40 mL  5-40 mL IntraVENous PRN    acetaminophen (TYLENOL) suppository 650 mg  650 mg Rectal Q6H PRN    bisacodyL (DULCOLAX) suppository 10 mg  10 mg Rectal DAILY PRN    promethazine (PHENERGAN) tablet 12.5 mg  12.5 mg Oral Q6H PRN    Or    ondansetron (ZOFRAN) injection 4 mg  4 mg IntraVENous Q6H PRN    enoxaparin (LOVENOX) injection 40 mg  40 mg SubCUTAneous DAILY    budesonide (PULMICORT) 500 mcg/2 ml nebulizer suspension  500 mcg Nebulization BID RT    arformoteroL (BROVANA) neb solution 15 mcg  15 mcg Nebulization BID RT    albuterol-ipratropium (DUO-NEB) 2.5 MG-0.5 MG/3 ML  3 mL Nebulization Q4H PRN     Prior to Admission medications    Medication Sig Start Date End Date Taking? Authorizing Provider   predniSONE (STERAPRED DS) 10 mg dose pack Take 6 tablets p.o. daily x1 day, take 5 tablets p.o. daily x1 day, take 4 tablets p.o. daily x1 day, take 3 tabs p.o. daily x1 day, take 2 tablets p.o. daily x1 day, and take 1 tablet p.o. daily x1 day. 3/2/21   Lety Zapata MD   amLODIPine (NORVASC) 5 mg tablet Take 1 Tab by mouth daily. 3/3/21   Lety Zapata MD   albuterol-ipratropium (DUO-NEB) 2.5 mg-0.5 mg/3 ml nebu 3 mL by Nebulization route every four (4) hours as needed for Wheezing. 2/7/21   Arnold Jordan MD   albuterol (PROVENTIL HFA, VENTOLIN HFA, PROAIR HFA) 90 mcg/actuation inhaler Take 2 Puffs by inhalation every four (4) hours as needed for Wheezing or Shortness of Breath. 2/7/21   Arnold Jordan MD   OXYGEN-AIR DELIVERY SYSTEMS 2 L/min by Nasal route continuous. Provider, Historical   furosemide (Lasix) 20 mg tablet Take 20 mg by mouth daily. Provider, Historical   dilTIAZem (CARDIZEM) 30 mg tablet Take 1 Tab by mouth Before breakfast, lunch, and dinner. Patient taking differently: Take 30 mg by mouth daily. Daily 4/14/20   Teodoro Severe, MD   acetaminophen (TYLENOL) 325 mg tablet Take 650 mg by mouth every eight (8) hours as needed for Pain.     Provider, Historical   dextran 70/hypromellose (ARTIFICIAL TEARS, PF, OP) Apply 2 Drops to eye as needed for Other (both eyes for dryness). Provider, Historical   diphenhydrAMINE (BENADRYL) 25 mg capsule Take 25 mg by mouth nightly as needed for Itching. Provider, Historical   tamsulosin (FLOMAX) 0.4 mg capsule Take 0.4 mg by mouth every evening. Other, MD Blayne       Allergy  Allergies   Allergen Reactions    Aspirin Other (comments)     \"messes my stomach up\"       Physical Exam:   Vital Signs:    Blood pressure (!) 135/56, pulse 73, temperature 97.9 °F (36.6 °C), resp. rate 20, height 5' 7\" (1.702 m), weight 98.6 kg (217 lb 4.8 oz), SpO2 99 %. Body mass index is 34.03 kg/m². O2 Device: Nasal cannula   O2 Flow Rate (L/min): 3 l/min   Temp (24hrs), Av.8 °F (36.6 °C), Min:97.8 °F (36.6 °C), Max:97.9 °F (36.6 °C)       Intake/Output:   Last shift:      No intake/output data recorded. Last 3 shifts:  1901 -  0700  In: 550 [I.V.:550]  Out: 1250 [Urine:800]    Intake/Output Summary (Last 24 hours) at 2021 0851  Last data filed at 2021 0530  Gross per 24 hour   Intake --   Output 800 ml   Net -800 ml         Physical Exam:   Patient on nasal cannula oxygen; breathing pattern better; acyanotic  HEENT: pupils not dilated, reactive, no scleral jaundice  Neck: No adenopathy or thyroid swelling  CVS: Normal heart sounds; S1 and S2 with no murmur; JVD not elevated; telemetry-sinus rhythm.   RS: Symmetrical breath sounds; moderate air entry bilaterally; mild bilateral wheezes; decreased breath sounds bases with some bibasal crackles; not tachypneic or in distress   Abd: Soft, obese, no tenderness, no distention, no guarding or rigidity; bowel sounds present; no hepatosplenomegaly  Neuro: non focal, awake, alert  Extrm: Mild to moderate bilateral pitting leg edema; no focal swelling or clubbing  Skin: no rash  Lymphatic: no cervical or supraclavicular adenopathy  MS: Left thigh swollen, erythematous and tender; mild bruising noted; large blister noted       Labs Reviewed:    Recent Results (from the past 12 hour(s))   RENAL FUNCTION PANEL    Collection Time: 04/11/21  4:05 AM   Result Value Ref Range    Sodium 136 136 - 145 mmol/L    Potassium 3.9 3.5 - 5.5 mmol/L    Chloride 98 (L) 100 - 111 mmol/L    CO2 30 21 - 32 mmol/L    Anion gap 8 3.0 - 18 mmol/L    Glucose 147 (H) 74 - 99 mg/dL    BUN 47 (H) 7.0 - 18 MG/DL    Creatinine 1.65 (H) 0.6 - 1.3 MG/DL    BUN/Creatinine ratio 28 (H) 12 - 20      GFR est AA 49 (L) >60 ml/min/1.73m2    GFR est non-AA 41 (L) >60 ml/min/1.73m2    Calcium 7.3 (L) 8.5 - 10.1 MG/DL    Phosphorus 3.6 2.5 - 4.9 MG/DL    Albumin 2.2 (L) 3.4 - 5.0 g/dL   CBC WITH AUTOMATED DIFF    Collection Time: 04/11/21  4:05 AM   Result Value Ref Range    WBC 19.6 (H) 4.6 - 13.2 K/uL    RBC 2.78 (L) 4.35 - 5.65 M/uL    HGB 8.1 (L) 13.0 - 16.0 g/dL    HCT 24.2 (L) 36.0 - 48.0 %    MCV 87.1 74.0 - 97.0 FL    MCH 29.1 24.0 - 34.0 PG    MCHC 33.5 31.0 - 37.0 g/dL    RDW 13.3 11.6 - 14.5 %    PLATELET 329 838 - 766 K/uL    MPV 9.0 (L) 9.2 - 11.8 FL    NEUTROPHILS 89 (H) 40 - 73 %    LYMPHOCYTES 3 (L) 21 - 52 %    MONOCYTES 5 3 - 10 %    EOSINOPHILS 0 0 - 5 %    BASOPHILS 0 0 - 2 %    ABS. NEUTROPHILS 17.4 (H) 1.8 - 8.0 K/UL    ABS. LYMPHOCYTES 0.6 (L) 0.9 - 3.6 K/UL    ABS. MONOCYTES 1.0 0.05 - 1.2 K/UL    ABS. EOSINOPHILS 0.0 0.0 - 0.4 K/UL    ABS.  BASOPHILS 0.0 0.0 - 0.1 K/UL    DF AUTOMATED     GLUCOSE, POC    Collection Time: 04/11/21  6:17 AM   Result Value Ref Range    Glucose (POC) 171 (H) 70 - 110 mg/dL               Recent Labs     04/10/21  1415   FIO2I 100   HCO3I 32.4*   PCO2I 68.2*   PHI 7.29*   PO2I 447*       All Micro Results     Procedure Component Value Units Date/Time    COVID-19 RAPID TEST [[id number]] Collected: 04/05/21 1539    Order Status: Completed Specimen: Nasopharyngeal Updated: 04/05/21 1616     Specimen source Nasopharyngeal        COVID-19 rapid test Not detected        Comment: Rapid Abbott ID Now       Rapid NAAT:  The specimen is NEGATIVE for SARS-CoV-2, the novel coronavirus associated with COVID-19. Negative results should be treated as presumptive and, if inconsistent with clinical signs and symptoms or necessary for patient management, should be tested with an alternative molecular assay. Negative results do not preclude SARS-CoV-2 infection and should not be used as the sole basis for patient management decisions. This test has been authorized by the FDA under an Emergency Use Authorization (EUA) for use by authorized laboratories. Fact sheet for Healthcare Providers: kstattoo.com  Fact sheet for Patients: kstattoo.com       Methodology: Isothermal Nucleic Acid Amplification                 2/27/21 ECHO   · Left Ventricle: Normal cavity size, wall thickness and systolic function (ejection fraction normal). The estimated EF is 50 - 55%. There is mild (grade 1) left ventricular diastolic dysfunction E'E= 10.05. Poor resolution of endocardial border. Can not comment on wall motion abnormality  · Tricuspid Valve: Tricuspid valve not well visualized. No stenosis. Tricuspid regurgitation is inadequate for estimation of right ventricular systolic pressure      Imaging:  [x]I have personally reviewed the patients chest radiographs images and report with the patient    CT study 4/6/2021  [facility]   CT HIP LT WO CONT    Narrative ---------------------------------------------------------------------------  <<<<<<<<<           [facility]           >>>>>>>>>   ---------------------------------------------------------------------------    HISTORY:   -From Provider:r/o avn, not candidate for mri  -Additional: None    COMPARISON EXAMINATIONS:   None. TECHNIQUE:   CT of the left hip without intravenous contrast administration. Coronal and sagittal reformats were generated and reviewed.     One or more dose reduction techniques were used on this CT: automated exposure  control, adjustment of the mAs and/or kVp according to patient size, and  iterative reconstruction techniques. The specific techniques used on this CT  exam have been documented in the patient's electronic medical record.      --------------------------     FINDINGS    --------------------------    OSSEOUS STRUCTURES:    Left hip: There is irregular subchondral lucency and cortical irregularity with  slight flattening/collapse of the left femoral head. Severe degenerative changes  in the right hip with severe joint space narrowing, subchondral cysts and slight  marginal spurring. Soft tissue thickening about the joint space suggesting hip  joint effusion. Visualized bony pelvis and proximal femur: Degenerative changes in the SI joints  and spine. Relatively severe central and foraminal stenosis at L4/5 and to  lesser degree at L3/4. Visualized pelvic soft tissues:  LYMPH NODES:  Subcentimeter short axis left groin lymph nodes are present. [ ]  GI TRACT:  Colonic diverticulosis, without CT evidence diverticulitis. Feculent  distention of the rectum. PELVIC ORGANS:  The visualized portion of the bladder is unremarkable. VASCULATURE:  Atherosclerotic calcification in visualized left iliac and femoral  arteries. OTHER:   No ascites or free intraperitoneal air in visualized extent. Fat-containing left inguinal hernia. Fatty change in the gluteal musculature  bilaterally. Subcutaneous stranding/edema along left more than right lateral  flank.      ---------------------------------------------------------------------------    Impression ---------------------------------------------------------------------------    1. Subchondral serpiginous lucencies in the weightbearing left femoral head  suspicious for avascular necrosis, with associated cortical irregularity in  keeping with flattening/collapse. Suspected left hip joint effusion.     2. Relatively severe mild degenerative changes in the left hip. Mild  degenerative changes in the right hip. Degenerative changes in the SI joints and  spine. In the setting of radiculopathy, routine lumbar spine MRI could best  further assess. Chest x-ray 4/10/2021  COMPARISON: 4/5/2021  TECHNIQUE: Single portable view. FINDINGS:  SUPPORT DEVICES: None  HEART AND MEDIASTINUM: Cardiac size is normal. Mediastinal contours are normal.  Normal pulmonary vasculature. LUNGS AND PLEURAL SPACES: Right base pleuroparenchymal scarring without change. Multiple areas of right pleural calcification without change. BONES AND SOFT TISSUES: Bony structures are normal. Chronic elevation right  hemidiaphragm. IMPRESSION  No evidence of active pulmonary process. Chronic right basilar pleuroparenchymal  scarring and multiple right sided pleural plaques without change. Ultrasound legs 4/10/2021  Interpretation Summary  · No evidence of deep vein thrombosis in the right lower extremity. · No evidence of deep vein thrombosis in the left lower extremity.          PFT 6/29/20 - FEV1 54% [GOLD CLASS 2], FEV1% 58, %, %, DLCO 67% - moderate COPD with hyperinflation and reduced DLCO      IMPRESSION:   · Acute respiratory failure with hypoxemia and hypercapnia  · COPD exacerbation  · Acute pulmonary edema  · Left hip avascular necrosis  · S/p left hip replacement  · Obesity  · Anemia  ·   ·   Patient Active Problem List   Diagnosis Code    Hypertension I10    COPD (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 ([facility]) J44.9    Chronic renal disease, stage 3, moderately decreased glomerular filtration rate between 30-59 mL/min/1.73 square meter ([facility]) N18.30    Acute exacerbation of chronic obstructive pulmonary disease (COPD) ([facility] [address] J44.1    Debility R53.81    Chronic respiratory failure with hypoxia ([facility]) J96.11    Tobacco dependence F17.200    Left hip pain M25.552    PAF (paroxysmal atrial fibrillation) ([facility]) I48.0    Avascular necrosis of bone of left hip ([facility]) M87.052    Acute respiratory failure with hypoxia and hypercapnia ([facility]) J96.01, J96.02    Obesity E66.9    Acute pulmonary edema ([facility]) J81.0    Anemia D64.9       CODE STATUS-partial code -no CPR or chest compressions; okay for intubation. RECOMMENDATIONS:   Respiratory: Patient's respiratory status seems to have improved; still wheezing; s/p left hip surgery with concerns for acute pulmonary thromboembolism. Continue bronchodilators-Brovana and budesonide 0.5 mg nebulizer twice a day; ipratropium nebulizer 4 times a day. IV Solu-Medrol-increased to 20 mg every 6 hours due to persistent wheezing. CTA chest study pending to rule out PEs. Chest x-ray from 4/10-chronic right diaphragm elevation; bilateral emphysematous changes; chronic right pleural calcification seen on prior chest CT. Continue nasal cannula oxygen supplementation-currently at 3 L; keep oxygen saturation greater than 91%; incentive spirometry as tolerated. BiPAP as needed during daytime and nighttime. Continue to monitor patient for respiratory status, and low threshold for intubation. CVS: Stable hemodynamics; continue Cardizem 30 mg 3 times a day. Lasix 20 mg in the morning; additional Lasix 20 mg IV noontime. Prior echocardiogram showed normal LV function. Troponins 4/10-negative x 2. ID: Persistent leukocytosis; afebrile; s/p left hip replacement. Expanded antibiotic coverage-Zosyn and IV vancomycin. Rapid Covid test negative this admission; SARS-CoV-2 PCR repeated 4/9 for rehab placement-negative. Renal: Monitor renal function; patient has CKD-creatinine 1.65 today. Also CT angiogram planned to evaluate for PEs which is necessary to rule out. Calcium replaced 1 g. Nephrology on case. GI: No active issues; oral diet as tolerated off BiPAP. Endo: Monitor blood sugars-mildly elevated; added sliding scale insulin. Neuro: Normal mentation. Hem: Monitor hemoglobin and platelets; hemoglobin drifting down; s/p left hip replacement; hold Lovenox DVT prophylaxis; monitor H&H every 6 hours; transfuse if hemoglobin less than 7 g/dL. MS: S/p left hip replacement for avascular necrosis; increased pain and swelling; planned for CT left hip to evaluate for hematoma-pending this morning; orthopedic team aware. Vasc: Ultrasound legs negative for DVT. DVT and GI prophylaxis: SCDs and Pepcid. Discussed with hospitalist regarding management plans. I have discussed and updated management plans with patient as well; discussed with RN. High complexity review and management; critical care time 42 minutes excluding discussion of procedures. Family discussion-wife [Gloria Shukla -[phone].            Loraine Hawthorne MD

## 2021-04-12 LAB
ABO + RH BLD: NORMAL
ALBUMIN SERPL-MCNC: 2.1 G/DL (ref 3.4–5)
ANION GAP SERPL CALC-SCNC: 7 MMOL/L (ref 3–18)
BASOPHILS # BLD: 0 K/UL (ref 0–0.1)
BASOPHILS NFR BLD: 0 % (ref 0–2)
BLD PROD TYP BPU: NORMAL
BLOOD BANK CMNT PATIENT-IMP: NORMAL
BLOOD GROUP ANTIBODIES SERPL: NORMAL
BPU ID: NORMAL
BUN SERPL-MCNC: 51 MG/DL (ref 7–18)
BUN/CREAT SERPL: 31 (ref 12–20)
CALCIUM SERPL-MCNC: 7.1 MG/DL (ref 8.5–10.1)
CALLED TO:,BCALL1: NORMAL
CHLORIDE SERPL-SCNC: 98 MMOL/L (ref 100–111)
CO2 SERPL-SCNC: 31 MMOL/L (ref 21–32)
CREAT SERPL-MCNC: 1.67 MG/DL (ref 0.6–1.3)
CROSSMATCH RESULT,%XM: NORMAL
DIFFERENTIAL METHOD BLD: ABNORMAL
EOSINOPHIL # BLD: 0 K/UL (ref 0–0.4)
EOSINOPHIL NFR BLD: 0 % (ref 0–5)
ERYTHROCYTE [DISTWIDTH] IN BLOOD BY AUTOMATED COUNT: 13.5 % (ref 11.6–14.5)
GLUCOSE BLD STRIP.AUTO-MCNC: 155 MG/DL (ref 70–110)
GLUCOSE BLD STRIP.AUTO-MCNC: 164 MG/DL (ref 70–110)
GLUCOSE BLD STRIP.AUTO-MCNC: 169 MG/DL (ref 70–110)
GLUCOSE BLD STRIP.AUTO-MCNC: 200 MG/DL (ref 70–110)
GLUCOSE SERPL-MCNC: 149 MG/DL (ref 74–99)
HCT VFR BLD AUTO: 21.9 % (ref 36–48)
HCT VFR BLD AUTO: 22.4 % (ref 36–48)
HCT VFR BLD AUTO: 22.8 % (ref 36–48)
HCT VFR BLD AUTO: 23.2 % (ref 36–48)
HGB BLD-MCNC: 7.5 G/DL (ref 13–16)
HGB BLD-MCNC: 7.6 G/DL (ref 13–16)
HGB BLD-MCNC: 7.7 G/DL (ref 13–16)
HGB BLD-MCNC: 7.9 G/DL (ref 13–16)
LYMPHOCYTES # BLD: 0.6 K/UL (ref 0.9–3.6)
LYMPHOCYTES NFR BLD: 4 % (ref 21–52)
MCH RBC QN AUTO: 29.3 PG (ref 24–34)
MCHC RBC AUTO-ENTMCNC: 33.3 G/DL (ref 31–37)
MCV RBC AUTO: 88 FL (ref 74–97)
MONOCYTES # BLD: 1 K/UL (ref 0.05–1.2)
MONOCYTES NFR BLD: 6 % (ref 3–10)
NEUTS SEG # BLD: 13.7 K/UL (ref 1.8–8)
NEUTS SEG NFR BLD: 87 % (ref 40–73)
PHOSPHATE SERPL-MCNC: 3.5 MG/DL (ref 2.5–4.9)
PLATELET # BLD AUTO: 241 K/UL (ref 135–420)
PMV BLD AUTO: 9.4 FL (ref 9.2–11.8)
POTASSIUM SERPL-SCNC: 3.6 MMOL/L (ref 3.5–5.5)
RBC # BLD AUTO: 2.59 M/UL (ref 4.35–5.65)
SODIUM SERPL-SCNC: 136 MMOL/L (ref 136–145)
SPECIMEN EXP DATE BLD: NORMAL
STATUS OF UNIT,%ST: NORMAL
UNIT DIVISION, %UDIV: 0
WBC # BLD AUTO: 15.7 K/UL (ref 4.6–13.2)

## 2021-04-12 PROCEDURE — 85014 HEMATOCRIT: CPT

## 2021-04-12 PROCEDURE — 74011000250 HC RX REV CODE- 250: Performed by: INTERNAL MEDICINE

## 2021-04-12 PROCEDURE — 85025 COMPLETE CBC W/AUTO DIFF WBC: CPT

## 2021-04-12 PROCEDURE — 82962 GLUCOSE BLOOD TEST: CPT

## 2021-04-12 PROCEDURE — 74011000258 HC RX REV CODE- 258: Performed by: INTERNAL MEDICINE

## 2021-04-12 PROCEDURE — 94640 AIRWAY INHALATION TREATMENT: CPT

## 2021-04-12 PROCEDURE — 74011250637 HC RX REV CODE- 250/637: Performed by: INTERNAL MEDICINE

## 2021-04-12 PROCEDURE — 74011000250 HC RX REV CODE- 250: Performed by: HOSPITALIST

## 2021-04-12 PROCEDURE — 74011250637 HC RX REV CODE- 250/637: Performed by: HOSPITALIST

## 2021-04-12 PROCEDURE — 65610000006 HC RM INTENSIVE CARE

## 2021-04-12 PROCEDURE — 74011250637 HC RX REV CODE- 250/637: Performed by: PHYSICIAN ASSISTANT

## 2021-04-12 PROCEDURE — 97166 OT EVAL MOD COMPLEX 45 MIN: CPT

## 2021-04-12 PROCEDURE — 97530 THERAPEUTIC ACTIVITIES: CPT

## 2021-04-12 PROCEDURE — 80069 RENAL FUNCTION PANEL: CPT

## 2021-04-12 PROCEDURE — 77010033678 HC OXYGEN DAILY

## 2021-04-12 PROCEDURE — 97110 THERAPEUTIC EXERCISES: CPT

## 2021-04-12 PROCEDURE — 74011250636 HC RX REV CODE- 250/636: Performed by: INTERNAL MEDICINE

## 2021-04-12 PROCEDURE — 74011250636 HC RX REV CODE- 250/636: Performed by: PHYSICIAN ASSISTANT

## 2021-04-12 PROCEDURE — 36415 COLL VENOUS BLD VENIPUNCTURE: CPT

## 2021-04-12 PROCEDURE — 76450000000

## 2021-04-12 PROCEDURE — 97535 SELF CARE MNGMENT TRAINING: CPT

## 2021-04-12 PROCEDURE — 99231 SBSQ HOSP IP/OBS SF/LOW 25: CPT | Performed by: NURSE PRACTITIONER

## 2021-04-12 PROCEDURE — 74011636637 HC RX REV CODE- 636/637: Performed by: INTERNAL MEDICINE

## 2021-04-12 PROCEDURE — 97116 GAIT TRAINING THERAPY: CPT

## 2021-04-12 RX ORDER — FAMOTIDINE 20 MG/1
20 TABLET, FILM COATED ORAL DAILY
Status: DISCONTINUED | OUTPATIENT
Start: 2021-04-13 | End: 2021-04-14 | Stop reason: HOSPADM

## 2021-04-12 RX ADMIN — INSULIN LISPRO 2 UNITS: 100 INJECTION, SOLUTION INTRAVENOUS; SUBCUTANEOUS at 17:11

## 2021-04-12 RX ADMIN — INSULIN LISPRO 4 UNITS: 100 INJECTION, SOLUTION INTRAVENOUS; SUBCUTANEOUS at 21:28

## 2021-04-12 RX ADMIN — METHYLPREDNISOLONE SODIUM SUCCINATE 20 MG: 40 INJECTION, POWDER, FOR SOLUTION INTRAMUSCULAR; INTRAVENOUS at 21:23

## 2021-04-12 RX ADMIN — VANCOMYCIN HYDROCHLORIDE 1500 MG: 10 INJECTION, POWDER, LYOPHILIZED, FOR SOLUTION INTRAVENOUS at 10:35

## 2021-04-12 RX ADMIN — INSULIN LISPRO 2 UNITS: 100 INJECTION, SOLUTION INTRAVENOUS; SUBCUTANEOUS at 11:58

## 2021-04-12 RX ADMIN — FAMOTIDINE 20 MG: 20 TABLET, FILM COATED ORAL at 08:29

## 2021-04-12 RX ADMIN — IPRATROPIUM BROMIDE 0.5 MG: 0.5 SOLUTION RESPIRATORY (INHALATION) at 20:42

## 2021-04-12 RX ADMIN — PIPERACILLIN SODIUM AND TAZOBACTAM SODIUM 3.38 G: 3; .375 INJECTION, POWDER, LYOPHILIZED, FOR SOLUTION INTRAVENOUS at 09:38

## 2021-04-12 RX ADMIN — IPRATROPIUM BROMIDE 0.5 MG: 0.5 SOLUTION RESPIRATORY (INHALATION) at 15:39

## 2021-04-12 RX ADMIN — METHYLPREDNISOLONE SODIUM SUCCINATE 20 MG: 40 INJECTION, POWDER, FOR SOLUTION INTRAMUSCULAR; INTRAVENOUS at 03:24

## 2021-04-12 RX ADMIN — PIPERACILLIN SODIUM AND TAZOBACTAM SODIUM 3.38 G: 3; .375 INJECTION, POWDER, LYOPHILIZED, FOR SOLUTION INTRAVENOUS at 03:24

## 2021-04-12 RX ADMIN — Medication 10 ML: at 06:00

## 2021-04-12 RX ADMIN — IPRATROPIUM BROMIDE 0.5 MG: 0.5 SOLUTION RESPIRATORY (INHALATION) at 11:17

## 2021-04-12 RX ADMIN — Medication 10 ML: at 21:28

## 2021-04-12 RX ADMIN — KETOROLAC TROMETHAMINE 15 MG: 30 INJECTION, SOLUTION INTRAMUSCULAR at 09:38

## 2021-04-12 RX ADMIN — FERROUS SULFATE TAB 325 MG (65 MG ELEMENTAL FE) 325 MG: 325 (65 FE) TAB at 08:27

## 2021-04-12 RX ADMIN — SENNOSIDES 8.6 MG: 8.6 TABLET, FILM COATED ORAL at 21:23

## 2021-04-12 RX ADMIN — FUROSEMIDE 20 MG: 20 TABLET ORAL at 08:28

## 2021-04-12 RX ADMIN — BUDESONIDE 500 MCG: 0.5 INHALANT RESPIRATORY (INHALATION) at 07:35

## 2021-04-12 RX ADMIN — METHYLPREDNISOLONE SODIUM SUCCINATE 20 MG: 40 INJECTION, POWDER, FOR SOLUTION INTRAMUSCULAR; INTRAVENOUS at 14:00

## 2021-04-12 RX ADMIN — FUROSEMIDE 20 MG: 10 INJECTION, SOLUTION INTRAMUSCULAR; INTRAVENOUS at 10:31

## 2021-04-12 RX ADMIN — IPRATROPIUM BROMIDE 0.5 MG: 0.5 SOLUTION RESPIRATORY (INHALATION) at 07:34

## 2021-04-12 RX ADMIN — ARFORMOTEROL TARTRATE 15 MCG: 15 SOLUTION RESPIRATORY (INHALATION) at 07:34

## 2021-04-12 RX ADMIN — ARFORMOTEROL TARTRATE 15 MCG: 15 SOLUTION RESPIRATORY (INHALATION) at 20:42

## 2021-04-12 RX ADMIN — AMLODIPINE BESYLATE 5 MG: 5 TABLET ORAL at 08:27

## 2021-04-12 RX ADMIN — SENNOSIDES 8.6 MG: 8.6 TABLET, FILM COATED ORAL at 09:00

## 2021-04-12 RX ADMIN — DILTIAZEM HYDROCHLORIDE 30 MG: 30 TABLET, FILM COATED ORAL at 08:27

## 2021-04-12 RX ADMIN — DILTIAZEM HYDROCHLORIDE 30 MG: 30 TABLET, FILM COATED ORAL at 10:30

## 2021-04-12 RX ADMIN — BUDESONIDE 500 MCG: 0.5 INHALANT RESPIRATORY (INHALATION) at 20:42

## 2021-04-12 RX ADMIN — METHYLPREDNISOLONE SODIUM SUCCINATE 20 MG: 40 INJECTION, POWDER, FOR SOLUTION INTRAMUSCULAR; INTRAVENOUS at 08:28

## 2021-04-12 RX ADMIN — DILTIAZEM HYDROCHLORIDE 30 MG: 30 TABLET, FILM COATED ORAL at 17:11

## 2021-04-12 RX ADMIN — FERROUS SULFATE TAB 325 MG (65 MG ELEMENTAL FE) 325 MG: 325 (65 FE) TAB at 17:11

## 2021-04-12 RX ADMIN — PIPERACILLIN SODIUM AND TAZOBACTAM SODIUM 3.38 G: 3; .375 INJECTION, POWDER, LYOPHILIZED, FOR SOLUTION INTRAVENOUS at 17:11

## 2021-04-12 RX ADMIN — INSULIN LISPRO 2 UNITS: 100 INJECTION, SOLUTION INTRAVENOUS; SUBCUTANEOUS at 08:28

## 2021-04-12 RX ADMIN — TAMSULOSIN HYDROCHLORIDE 0.4 MG: 0.4 CAPSULE ORAL at 18:00

## 2021-04-12 NOTE — CONSULTS
Palliative Medicine Consult    Patient Name: Naomi Iyer  YOB: 1945    Date of Initial Consult: April 7, 2021, April 8, 2021, April 9, 2021, April 12, 2021  Reason for Consult: Goals of care discussions  Requesting Provider: Dr. Jimmie Carpio  Primary Care Physician: Alia Ge MD      SUMMARY:   Naomi Iyer is a 66 y.o. with a past history of PAF, COPD, asbetosis, chronic respiratory failure with hypoxia 2 L, who was admitted on 4/5/2021 from home with a diagnosis of COPD exacerbation, hyponatremia, hypokalemia, and left hip pain. Current medical issues leading to Palliative Medicine involvement include: Support and goals of care discussions. April 8, 2021:  Patient is awake, alert, sitting up on edge of bed, less tearful today. April 9, 2021:  Patient is awake, alert, lying in bed, in good spirits waiting for surgery today. He hopes it sooner rather than later as he has quite the appetite and wants to eat and drink. April 12, 2021: Patient is awake, alert, lying in bed, in no acute distress, wants to start working with PT/OT   PALLIATIVE DIAGNOSES:   1. Goals of care discussions  2. Acute on chronic COPD  3. Left hip pain  4. Advanced age/debility       PLAN:   April 12, 2021: Met with patient at patient's bedside, patient is awake, alert, lying in bed, in no acute distress, wants to start working with PT/OT. He states he worked with PT this morning, and was able to sit on edge of the bed and stand though he \"did not walk too far\". Encouragement provided. He is glad he did the surgery, and admits he was having some respiratory distress over the weekend which has been improving. He is actively being treated for COPD exacerbation, on oxygen via nasal cannula, IV steroids, breathing treatment, and empiric antibiotics.  No changes in goals of care; patient is a Partial code: DNR in the event of cardiopulmonary arrest, okay with intubation prearrest (2-week trial, no tracheostomy), no feeding tubes. Will sign off as goals of care have been established. Please re-consult should it be warranted. Thank you for the opportunity to provide care to this patient. See previous discussions below    April 9, 2021: Palpation of patient's bedside, patient is awake, alert, lying in bed, in good spirits waiting for surgery today. He hopes it sooner rather than later as he has quite the appetite and wants to eat and drink. He feels better about the surgery knowing that his presurgical clearance was completed. Supportive visit today. No changes in goals of care; patient is a Partial code: DNR in the event of cardiopulmonary arrest, okay with intubation prearrest (2-week trial, no tracheostomy), no feeding tubes. We will continue to follow for support. April 8, 2021: Palliative medicine team including Yesica Frausto RN and I met with patient at patient's bedside. Patient is awake, alert, sitting up on edge of bed, less tearful today. He is a little bit more positive about the surgery, and looking forward to the preop testing. He states \"I am hoping for the best.\"  No family at bedside, but states that a daughter will come in to see him today. No changes in goals of care; patient is a Partial code: DNR in the event of cardiopulmonary arrest, okay with intubation prearrest (2-week trial, no tracheostomy), no feeding tubes. We will continue to follow for support. April 7, 2021: Goals of care discussions: Palliative medicine team including Yesica Frausto RN and I met with patient and patient's bedside. Patient is awake, alert, sitting up on the edge of the bed and crying. Support offered as patient shares he is a little sad and anxious about needing left hip surgery because he knows he is older in age and a high risk surgical candidate due to his chronic comorbidities. He states he is not afraid to die, but he is sad of the idea of leaving his family behind. Empathetic listening provided. Patient states he is willing to go through with a hip surgery, because as of right now he cannot walk due to excruciating pain to the left hip upon ambulation. He states he would rather take the risk of surgery then to live the rest of his life wheelchair-bound. Support offered. Palliative team met with patient yesterday and reviewed current AMD and POST form which remains his current wishes. Patient is a Partial code: DNR in the event of cardiopulmonary arrest, okay with intubation prearrest (2-week trial, no tracheostomy), no feeding tubes. We will continue to follow for support. 1. Acute on chronic COPD: COPD exacerbation. Weaning steroids, mildly improving, known history of asbestosis and chronic respiratory failure with hypoxia, he is on home O2 at 2 L. Able to talk in complete sentences and breathing comfortably even when crying. 2. Left hip pain: Ortho consulted. Per patient, he needs hip surgery because \"the hip is gone\". 3. Advanced age/debility: 68-year-old male who lives with his wife, Andrade Gallegos. He has five children. He lives in a single floor home and usually independent with ADLs but does use assistive devices including a cane and a walker for ambulatory assist.  Has not been able to drive recently due to shortness of breath and debility. 4. Initial consult note routed to primary continuity provider  5.  Communicated plan of care with: Palliative IDT       GOALS OF CARE / TREATMENT PREFERENCES:   [====Goals of Care====]  GOALS OF CARE: Partial code: DNR in the event of cardiopulmonary arrest, okay with intubation pre-arrest (2-week trial, no tracheostomy), no feeding tubes  Patient/Health Care Proxy Stated Goals: Prolong life      TREATMENT PREFERENCES:   Code Status: Partial Code    Advance Care Planning:  Advance Care Planning 4/5/2021   Patient's Healthcare Decision Maker is: -   Primary Decision Maker Name -   Primary Decision Maker Phone Number -   Primary Decision Maker Relationship to Patient -   Confirm Advance Directive Yes, on file   Patient Would Like to Complete Advance Directive -   Does the patient have other document types -       Medical Interventions: Full interventions           The palliative care team has discussed with patient / health care proxy about goals of care / treatment preferences for patient.  [====Goals of Care====]         HISTORY:     History obtained from: Patient, chart    CHIEF COMPLAINT: Feeling well today, ready to work with PT/OT, breathing has improved    HPI/SUBJECTIVE:    The patient is:   [x] Verbal and participatory  [] Non-participatory due to:   Oriented x4    Clinical Pain Assessment (nonverbal scale for severity on nonverbal patients):   Clinical Pain Assessment  Severity: 3            FUNCTIONAL ASSESSMENT:     Palliative Performance Scale (PPS):  PPS: 50       PSYCHOSOCIAL/SPIRITUAL SCREENING:     Advance Care Planning:  Advance Care Planning 4/5/2021   Patient's Healthcare Decision Maker is: -   Primary Decision Maker Name -   Primary Decision Maker Phone Number -   Primary Decision Maker Relationship to Patient -   Confirm Advance Directive Yes, on file   Patient Would Like to Complete Advance Directive -   Does the patient have other document types -        Any spiritual / Zoroastrianism concerns:  [] Yes /  [x] No    Caregiver Burnout:  [] Yes /  [] No /  [x] No Caregiver Present      Anticipatory grief assessment:   [x] Normal  / [] Maladaptive              REVIEW OF SYSTEMS:     Positive and pertinent negative findings in ROS are noted above in HPI. The following systems were [x] reviewed / [] unable to be reviewed as noted in HPI  Other findings are noted below. Systems: constitutional, ears/nose/mouth/throat, respiratory, gastrointestinal, genitourinary, musculoskeletal, integumentary, neurologic, psychiatric, endocrine. Positive findings noted below.   Modified ESAS Completed by: provider   Fatigue: 4       Pain: 3   Anxiety: 0 Nausea: 0 Dyspnea: 1     Constipation: No     Stool Occurrence(s): 1        PHYSICAL EXAM:     From RN flowsheet:  Wt Readings from Last 3 Encounters:   04/09/21 98.6 kg (217 lb 4.8 oz)   04/06/21 104.8 kg (231 lb 0.7 oz)   03/01/21 94.2 kg (207 lb 10.8 oz)     Blood pressure (!) 120/57, pulse 81, temperature 97.6 °F (36.4 °C), resp. rate 19, height 5' 7\" (1.702 m), weight 98.6 kg (217 lb 4.8 oz), SpO2 97 %. Pain Scale 1: Numeric (0 - 10)  Pain Intensity 1: 0     Pain Location 1: Hip  Pain Orientation 1: Left  Pain Description 1: Aching  Pain Intervention(s) 1: Medication (see MAR)      Constitutional: Awake, alert, lying in bed resting, in no acute distress  Eyes: pupils equal, anicteric  ENMT: no nasal discharge, moist mucous membranes  Cardiovascular: regular rhythm  Respiratory: breathing not labored, symmetric, on nasal cannula  Musculoskeletal: no deformity  Skin: warm, dry  Neurologic: following commands, moving all extremities, oriented x4  Psychiatric: full affect, no hallucinations       HISTORY:     Principal Problem:    Acute exacerbation of chronic obstructive pulmonary disease (COPD) ([address] (2/8/2019)    Active Problems:    Hypertension ()      Debility (7/8/2019)      Left hip pain (4/5/2021)      PAF (paroxysmal atrial fibrillation) ([address] (4/5/2021)      Avascular necrosis of bone of left hip ([address] (4/8/2021)      Acute respiratory failure with hypoxia and hypercapnia (HCC) (4/10/2021)      Obesity (4/10/2021)      Acute pulmonary edema (HCC) (4/10/2021)      Anemia (4/11/2021)      Past Medical History:   Diagnosis Date    Brain aneurysm     Cancer ([address]     prostate    COPD (chronic obstructive pulmonary disease) ([address]     Hypertension     Nocturia     Pneumonia     Radiation effect       Past Surgical History:   Procedure Laterality Date    HX HERNIA REPAIR        Family History   Problem Relation Age of Onset    Heart Disease Mother       History reviewed, no pertinent family history.   Social History     Tobacco Use    Smoking status: Former Smoker     Types: Cigarettes    Smokeless tobacco: Never Used   Substance Use Topics    Alcohol use: No     Allergies   Allergen Reactions    Aspirin Other (comments)     \"messes my stomach up\"      Current Facility-Administered Medications   Medication Dose Route Frequency    [START ON 4/13/2021] famotidine (PEPCID) tablet 20 mg  20 mg Oral DAILY    furosemide (LASIX) injection 20 mg  20 mg IntraVENous Q24H    Vancomycin - Pharmacokinetic Dosing  1 Each Other Rx Dosing/Monitoring    vancomycin (VANCOCIN) 1500 mg in  ml infusion  1,500 mg IntraVENous Q24H    methylPREDNISolone (PF) (SOLU-MEDROL) injection 20 mg  20 mg IntraVENous Q6H    piperacillin-tazobactam (ZOSYN) 3.375 g in 0.9% sodium chloride (MBP/ADV) 100 mL MBP  3.375 g IntraVENous Q8H    insulin lispro (HUMALOG) injection   SubCUTAneous AC&HS    glucose chewable tablet 16 g  4 Tab Oral PRN    glucagon (GLUCAGEN) injection 1 mg  1 mg IntraMUSCular PRN    dextrose 10% infusion 125-250 mL  125-250 mL IntraVENous PRN    0.9% sodium chloride infusion 250 mL  250 mL IntraVENous PRN    sodium chloride (NS) flush 5-40 mL  5-40 mL IntraVENous Q8H    sodium chloride (NS) flush 5-40 mL  5-40 mL IntraVENous PRN    ferrous sulfate tablet 325 mg  1 Tab Oral BID WITH MEALS    acetaminophen (TYLENOL) tablet 650 mg  650 mg Oral Q4H PRN    ketorolac (TORADOL) injection 15 mg  15 mg IntraVENous Q6H PRN    naloxone (NARCAN) injection 0.4 mg  0.4 mg IntraVENous PRN    promethazine (PHENERGAN) tablet 25 mg  25 mg Oral Q6H PRN    senna (SENOKOT) tablet 8.6 mg  1 Tab Oral BID    sodium chloride (OCEAN) 0.65 % nasal squeeze bottle 2 Spray  2 Spray Both Nostrils Q2H PRN    ipratropium (ATROVENT) 0.02 % nebulizer solution 0.5 mg  0.5 mg Nebulization QID RT    amLODIPine (NORVASC) tablet 5 mg  5 mg Oral DAILY    tamsulosin (FLOMAX) capsule 0.4 mg  0.4 mg Oral QPM    dilTIAZem IR (CARDIZEM) tablet 30 mg  30 mg Oral TIDAC    furosemide (LASIX) tablet 20 mg  20 mg Oral DAILY    acetaminophen (TYLENOL) suppository 650 mg  650 mg Rectal Q6H PRN    bisacodyL (DULCOLAX) suppository 10 mg  10 mg Rectal DAILY PRN    promethazine (PHENERGAN) tablet 12.5 mg  12.5 mg Oral Q6H PRN    Or    ondansetron (ZOFRAN) injection 4 mg  4 mg IntraVENous Q6H PRN    [Held by provider] enoxaparin (LOVENOX) injection 40 mg  40 mg SubCUTAneous DAILY    budesonide (PULMICORT) 500 mcg/2 ml nebulizer suspension  500 mcg Nebulization BID RT    arformoteroL (BROVANA) neb solution 15 mcg  15 mcg Nebulization BID RT    albuterol-ipratropium (DUO-NEB) 2.5 MG-0.5 MG/3 ML  3 mL Nebulization Q4H PRN          LAB AND IMAGING FINDINGS:     Lab Results   Component Value Date/Time    WBC 15.7 (H) 04/12/2021 04:09 AM    HGB 7.7 (L) 04/12/2021 11:27 AM    PLATELET 876 65/86/7142 04:09 AM     Lab Results   Component Value Date/Time    Sodium 136 04/12/2021 04:09 AM    Potassium 3.6 04/12/2021 04:09 AM    Chloride 98 (L) 04/12/2021 04:09 AM    CO2 31 04/12/2021 04:09 AM    BUN 51 (H) 04/12/2021 04:09 AM    Creatinine 1.67 (H) 04/12/2021 04:09 AM    Calcium 7.1 (L) 04/12/2021 04:09 AM    Magnesium 2.2 04/10/2021 02:40 PM    Phosphorus 3.5 04/12/2021 04:09 AM      Lab Results   Component Value Date/Time    Alk.  phosphatase 68 04/10/2021 02:40 PM    Protein, total 5.6 (L) 04/10/2021 02:40 PM    Albumin 2.1 (L) 04/12/2021 04:09 AM    Globulin 2.7 04/10/2021 02:40 PM     Lab Results   Component Value Date/Time    INR 0.9 07/09/2019 05:20 AM    Prothrombin time 12.3 07/09/2019 05:20 AM    aPTT 110.0 (H) 11/01/2019 04:40 AM      No results found for: IRON, FE, TIBC, IBCT, PSAT, FERR   No results found for: PH, PCO2, PO2  No components found for: [facility]   Lab Results   Component Value Date/Time     04/10/2021 08:05 PM    CK - MB 2.8 04/10/2021 08:05 PM                Total time: 15 minutes  Counseling / coordination time, spent as noted above: 10 minutes  > 50% counseling / coordination?:  Yes, patient    Prolonged service was provided for  []30 min   []75 min in face to face time in the presence of the patient, spent as noted above. Time Start:   Time End:   Note: this can only be billed with 42723 (initial) or 39134 (follow up). If multiple start / stop times, list each separately.

## 2021-04-12 NOTE — WOUND CARE
IP WOUND CONSULT    Danya Finch  MEDICAL RECORD NUMBER:  [id number]  AGE: 66 y.o. GENDER: male  : 1945  TODAY'S DATE:  2021    GENERAL     [] Follow-up   [x] Low randall Finch is a 66 y.o. male referred by:   [] Physician  [x] Nursing  [] Other:         PAST MEDICAL HISTORY     Past Medical History:   Diagnosis Date    Brain aneurysm     [address]     prostate    [facility]     Hypertension     Nocturia     Pneumonia     Radiation effect         PAST SURGICAL HISTORY    Past Surgical History:   Procedure Laterality Date    HX HERNIA REPAIR         FAMILY HISTORY    Family History   Problem Relation Age of Onset    Heart Disease Mother        SOCIAL HISTORY    Social History     Tobacco Use    Smoking status: Former Smoker     Types: Cigarettes    Smokeless tobacco: Never Used   Substance Use Topics    Alcohol use: No    Drug use: Never       ALLERGIES    Allergies   Allergen Reactions    Aspirin Other (comments)     \"messes my stomach up\"       MEDICATIONS    No current facility-administered medications on file prior to encounter. Current Outpatient Medications on File Prior to Encounter   Medication Sig Dispense Refill    predniSONE (STERAPRED DS) 10 mg dose pack Take 6 tablets p.o. daily x1 day, take 5 tablets p.o. daily x1 day, take 4 tablets p.o. daily x1 day, take 3 tabs p.o. daily x1 day, take 2 tablets p.o. daily x1 day, and take 1 tablet p.o. daily x1 day. 21 Tab 0    amLODIPine (NORVASC) 5 mg tablet Take 1 Tab by mouth daily. 30 Tab 0    albuterol-ipratropium (DUO-NEB) 2.5 mg-0.5 mg/3 ml nebu 3 mL by Nebulization route every four (4) hours as needed for Wheezing. 30 Nebule 0    albuterol (PROVENTIL HFA, VENTOLIN HFA, PROAIR HFA) 90 mcg/actuation inhaler Take 2 Puffs by inhalation every four (4) hours as needed for Wheezing or Shortness of Breath.  1 Inhaler 1    OXYGEN-AIR DELIVERY SYSTEMS 2 L/min by Nasal route continuous.  furosemide (Lasix) 20 mg tablet Take 20 mg by mouth daily.  dilTIAZem (CARDIZEM) 30 mg tablet Take 1 Tab by mouth Before breakfast, lunch, and dinner. (Patient taking differently: Take 30 mg by mouth daily. Daily) 90 Tab 0    acetaminophen (TYLENOL) 325 mg tablet Take 650 mg by mouth every eight (8) hours as needed for Pain.  dextran 70/hypromellose (ARTIFICIAL TEARS, PF, OP) Apply 2 Drops to eye as needed for Other (both eyes for dryness).  diphenhydrAMINE (BENADRYL) 25 mg capsule Take 25 mg by mouth nightly as needed for Itching.  tamsulosin (FLOMAX) 0.4 mg capsule Take 0.4 mg by mouth every evening. Wt Readings from Last 3 Encounters:   04/09/21 98.6 kg (217 lb 4.8 oz)   04/06/21 104.8 kg (231 lb 0.7 oz)   03/01/21 94.2 kg (207 lb 10.8 oz)       [unfilled]  Visit Vitals  BP (!) 120/57   Pulse 81   Temp 97.8 °F (36.6 °C)   Resp 19   Ht 5' 7\" (1.702 m)   Wt 98.6 kg (217 lb 4.8 oz)   SpO2 97%   BMI 34.03 kg/m²       ASSESSMENT     Ulcer Identification:  Ulcer Type: non-healing surgical    Contributing Factors: edema, diabetes, decreased mobility and obesity    Incision 04/09/21 Hip Left (Active)   Dressing Status Dry; Intact; Old drainage noted 04/11/21 1453   Dressing/Treatment Other (Comment) 04/10/21 0343   Closure Surgical glue; Sutures 04/09/21 1914   Margins Approximated 04/09/21 1914   Drainage Amount None 04/10/21 0858   Number of days: 3          PLAN     Skin Care & Pressure Relief Recommendations  Minimize layers of linen  Turn/reposition approximately every 2 hours    Physician/Provider notified: Yes  Recommendations: keep blistered area covered with a mepilex border until dry and or healed    Teaching completed with:   [x] Patient           [] Family member       [] Caregiver          [] Nursing  [] Other    Patient/Caregiver Teaching:  Level of patient/caregiver understanding able to:   [x] Indicates understanding       [] Needs reinforcement  [] Unsuccessful      [] Verbal Understanding  [] Demonstrated understanding       [] No evidence of learning  [] Refused teaching         [] N/A       Electronically signed by Su Bautista RN on 4/12/2021 at 11:08 AM

## 2021-04-12 NOTE — PROGRESS NOTES
Chart reviewed and placed [facility] inside patients chart, cm updated [facility] and to cont to follow case for d/c planning, pt was initially declined by facility.

## 2021-04-12 NOTE — WOUND CARE
04/12/21 1113   Incision 04/09/21 Hip Left   Date First Assessed/Time First Assessed: 04/09/21 1914   Location: Hip  Wound Location Orientation: Left   Wound Image    Dressing Status Intact   Dressing Change Due   (PRN)   Cleansed Irrigated with saline   Dressing/Treatment Silicone border   Drainage Amount Small   Drainage Description Serosanguinous   Wound Odor None   Camryn-Wound/Incision Assessment Warm;Edematous; Ecchymosis; Blisters

## 2021-04-12 NOTE — PROGRESS NOTES
Problem: Mobility Impaired (Adult and Pediatric)  Goal: *Acute Goals and Plan of Care (Insert Text)  Description: Physical Therapy Goals   Initiated 4/10/2021 and to be accomplished within 3-5 day(s)  1. Patient will move from supine <> sit with S in prep for out of bed activity and change of position. 2.  Patient will perform sit<> stand with S with LRAD in prep for transfers/ambulation. 3.  Patient will transfer from bed <> chair with S with LRAD for time up in chair for completion of ADL activity. 4.  Patient will ambulate >50 feet with CGA/LRAD for improved functional mobility at discharge. 5.  Patient will ascend/descend 3-5 stairs with handrails with minimal assistance/contact guard assist for home re-entry as needed. Note:     PHYSICAL THERAPY TREATMENT    Patient: Flaquita Lira (31 y.o. male)  Date: 4/12/2021  Diagnosis: Acute exacerbation of chronic obstructive pulmonary disease (COPD) ([facility]) [J44.1] Acute exacerbation of chronic obstructive pulmonary disease (COPD) ([facility])  Procedure(s) (LRB):  HIP ARTHROPLASTY TOTAL ANTERIOR APPROACH - WITH C-ARM, (Left) 3 Days Post-Op  Precautions: Fall, WBAT    ASSESSMENT:  Pt supine in bed on PT entry, crying out in pain with light touch to L hip. Educated pt that incr in pain is likely d/t stiffness from being immobile in bed. Pt performed LE therapeutic exercise as described below for LE strength and ROM, pt with decr ability to perform therapeutic exercise with L LE d/t pain. Pt noted to have been incontinent of urine. Pt transitioned to sit EOB with Catina. Pt agreeable to attempt standing, stood from bed with RW and CGA, vc for safe hand placement. Pt able to side step at EOB in order to move up in bed. Pt desat to 85% with OOB mobility, from 96% at rest. Pt returned to sitting EOB, O2 incr to 97% within 2 minutes of sitting. Pt returned to supine in bed, CNA assisting with changing linens, with pt rolling in bed.  Pt left supine in bed, reported decr in pain after session, ice to L hip. Discussed pt's performance with CAITLIN Alas. Progression toward goals:   []      Improving appropriately and progressing toward goals  [x]      Improving slowly and progressing toward goals  []      Not making progress toward goals and plan of care will be adjusted     PLAN:  Patient continues to benefit from skilled intervention to address the above impairments. Continue treatment per established plan of care. Discharge Recommendations:  Rehab  Further Equipment Recommendations for Discharge:  TBD at next level of care     SUBJECTIVE:   Patient stated I 'm hurting so bad and nothing is helping (on PT entry).     OBJECTIVE DATA SUMMARY:   Critical Behavior:  Neurologic State: Alert  Orientation Level: Oriented X4  Cognition: Follows commands, Appropriate for age attention/concentration  Safety/Judgement: Awareness of environment, Insight into deficits  Functional Mobility Training:  Bed Mobility:  Supine to Sit: Minimum assistance  Sit to Supine: Stand-by assistance  Scooting: Contact guard assistance  Transfers:  Sit to Stand: Contact guard assistance(vc)  Stand to Sit: Contact guard assistance(vc)  Balance:  Sitting: Intact  Standing: Intact; With support   Range Of Motion:   AROM: Generally decreased, functional  Ambulation/Gait Training:  Distance (ft): 4 Feet (ft)  Assistive Device: Gait belt;Walker, rolling  Ambulation - Level of Assistance: Minimal assistance(vc)  Gait Abnormalities: Decreased step clearance;Shuffling gait  Stance: Left decreased  Speed/[place]isbet: Slow;Shuffled  Step Length: Left shortened;Right shortened  Swing Pattern: Left asymmetrical;Right asymmetrical  Therapeutic Exercises:     EXERCISE   Sets   Reps   Active Active Assist   Passive Self ROM   Comments   Ankle Pumps 1 10  [x] [] [] []    Quad Sets/Glut Sets 1 10  [x] [] [] [] Hold for 5 secs   Hamstring Sets 1 10 [x] [] [] [] R only   Short Arc Quads   [] [] [] []    Heel Slides 1 10 [] [x] [] [] R only   Straight Leg Raises   [] [] [] []      Pain:  Pain level pre-treatment: 10/10  Pain level post-treatment: 5/10   Pain Intervention(s): Medication (see MAR); Rest, Ice, Repositioning   Response to intervention: Nurse notified, See doc flow  Activity Tolerance:   Pt with decr in pain with mobility, tolerated sitting EOB, pt desat to 85% with OOB mobility, recovered on return to sitting within 2 minutes  Please refer to the flowsheet for vital signs taken during this treatment. After treatment:   [] Patient left in no apparent distress sitting up in chair  [x] Patient left in no apparent distress in bed  [x] Call bell left within reach  [x] Nursing notified  [] Caregiver present  [x] Bed alarm activated  [] SCDs applied      COMMUNICATION/EDUCATION:   [x]         Role of Physical Therapy in the acute care setting. [x]         Fall prevention education was provided and the patient/caregiver indicated understanding. [x]         Patient/family have participated as able in working toward goals and plan of care. [x]         Patient/family agree to work toward stated goals and plan of care. []         Patient understands intent and goals of therapy, but is neutral about his/her participation. []         Patient is unable to participate in stated goals/plan of care: ongoing with therapy staff.  []         Other:         Maren Serum   Time Calculation: 48 mins

## 2021-04-12 NOTE — PROGRESS NOTES
Pulmonary and Sleep Medicine     Pulmonary/CC Note    Patient: Agustin Wallace               Sex: male          DOA: 4/5/2021       YOB: 1945      Age:  66 y.o.        LOS:  LOS: 7 days          HPI:   Mr. Agustin Wallace is a 66 y.o. male who with history of chronic COPD, paroxysmal atrial fibrillation, asbestos pleural disease,. He was recently admitted to the ICU in March 2021 with COPD exacerbation and needed to be intubated. This admission, patient came with left hip pain, and found to have left hip avascular necrosis. He underwent pulmonary preop clearance, and subsequently underwent left hip replacement 4/9/2021.    04/12/21   Patient remains in ICU room 103. Respiratory status is now at his baseline with history of COPD. On O2 2 LPM.  Left hip pain +  Denies cough, wheezing, chest pain, sputum production, leg edema, PND. No fever. Hemodynamically stable. [facility] was not called for any issues overnight. No other overnight issues reported.     Review of Systems:   Ears, nose, mouth, throat, and face: No epistaxis, No nasal drainage, no difficulty in swallowing  Respiratory: as above  Cardiovascular: no chest pain or palpitations, chronic leg edema, no syncope  Gastrointestinal: no abd pain, vomitting or diarrhea, no bleeding symptoms  Genitourinary: No urinary symptoms  Integument/breast: No ulcers  Musculoskeletal: Left hip pains and swelling  Neurological: No focal weakness or seizures or headaches  Constitutional: No fever or chills       Past Medical History  Past Medical History:   Diagnosis Date    Brain aneurysm     Cancer [facility] [address]     prostate    COPD [facility]chronic obstructive pulmonary disease) [facility] [address]     Hypertension     Nocturia     Pneumonia     Radiation effect        Past Surgical History  Past Surgical History:   Procedure Laterality Date    HX HERNIA REPAIR         Family History  Family History   Problem Relation Age of Onset    Heart Disease Mother        Social History  Social History     Tobacco Use    Smoking status: Former Smoker     Types: Cigarettes    Smokeless tobacco: Never Used   Substance Use Topics    Alcohol use: No    Drug use: Never        Medications  Current Facility-Administered Medications   Medication Dose Route Frequency    [START ON 4/13/2021] famotidine (PEPCID) tablet 20 mg  20 mg Oral DAILY    furosemide (LASIX) injection 20 mg  20 mg IntraVENous Q24H    Vancomycin - Pharmacokinetic Dosing  1 Each Other Rx Dosing/Monitoring    vancomycin (VANCOCIN) 1500 mg in  ml infusion  1,500 mg IntraVENous Q24H    methylPREDNISolone (PF) (SOLU-MEDROL) injection 20 mg  20 mg IntraVENous Q6H    piperacillin-tazobactam (ZOSYN) 3.375 g in 0.9% sodium chloride (MBP/ADV) 100 mL MBP  3.375 g IntraVENous Q8H    insulin lispro (HUMALOG) injection   SubCUTAneous AC&HS    glucose chewable tablet 16 g  4 Tab Oral PRN    glucagon (GLUCAGEN) injection 1 mg  1 mg IntraMUSCular PRN    dextrose 10% infusion 125-250 mL  125-250 mL IntraVENous PRN    0.9% sodium chloride infusion 250 mL  250 mL IntraVENous PRN    sodium chloride (NS) flush 5-40 mL  5-40 mL IntraVENous Q8H    sodium chloride (NS) flush 5-40 mL  5-40 mL IntraVENous PRN    ferrous sulfate tablet 325 mg  1 Tab Oral BID WITH MEALS    acetaminophen (TYLENOL) tablet 650 mg  650 mg Oral Q4H PRN    ketorolac (TORADOL) injection 15 mg  15 mg IntraVENous Q6H PRN    naloxone (NARCAN) injection 0.4 mg  0.4 mg IntraVENous PRN    promethazine (PHENERGAN) tablet 25 mg  25 mg Oral Q6H PRN    senna (SENOKOT) tablet 8.6 mg  1 Tab Oral BID    sodium chloride (OCEAN) 0.65 % nasal squeeze bottle 2 Spray  2 Spray Both Nostrils Q2H PRN    ipratropium (ATROVENT) 0.02 % nebulizer solution 0.5 mg  0.5 mg Nebulization QID RT    amLODIPine (NORVASC) tablet 5 mg  5 mg Oral DAILY    tamsulosin (FLOMAX) capsule 0.4 mg  0.4 mg Oral QPM    dilTIAZem IR (CARDIZEM) tablet 30 mg  30 mg Oral TIDAC    furosemide (LASIX) tablet 20 mg  20 mg Oral DAILY    acetaminophen (TYLENOL) suppository 650 mg  650 mg Rectal Q6H PRN    bisacodyL (DULCOLAX) suppository 10 mg  10 mg Rectal DAILY PRN    promethazine (PHENERGAN) tablet 12.5 mg  12.5 mg Oral Q6H PRN    Or    ondansetron (ZOFRAN) injection 4 mg  4 mg IntraVENous Q6H PRN    [Held by provider] enoxaparin (LOVENOX) injection 40 mg  40 mg SubCUTAneous DAILY    budesonide (PULMICORT) 500 mcg/2 ml nebulizer suspension  500 mcg Nebulization BID RT    arformoteroL (BROVANA) neb solution 15 mcg  15 mcg Nebulization BID RT    albuterol-ipratropium (DUO-NEB) 2.5 MG-0.5 MG/3 ML  3 mL Nebulization Q4H PRN     Prior to Admission medications    Medication Sig Start Date End Date Taking? Authorizing Provider   predniSONE (STERAPRED DS) 10 mg dose pack Take 6 tablets p.o. daily x1 day, take 5 tablets p.o. daily x1 day, take 4 tablets p.o. daily x1 day, take 3 tabs p.o. daily x1 day, take 2 tablets p.o. daily x1 day, and take 1 tablet p.o. daily x1 day. 3/2/21   Melissa Zapata MD   amLODIPine (NORVASC) 5 mg tablet Take 1 Tab by mouth daily. 3/3/21   Melissa Zapata MD   albuterol-ipratropium (DUO-NEB) 2.5 mg-0.5 mg/3 ml nebu 3 mL by Nebulization route every four (4) hours as needed for Wheezing. 2/7/21   Ameena Jordan MD   albuterol (PROVENTIL HFA, VENTOLIN HFA, PROAIR HFA) 90 mcg/actuation inhaler Take 2 Puffs by inhalation every four (4) hours as needed for Wheezing or Shortness of Breath. 2/7/21   Ameena Jordan MD   OXYGEN-AIR DELIVERY SYSTEMS 2 L/min by Nasal route continuous. Provider, Historical   furosemide (Lasix) 20 mg tablet Take 20 mg by mouth daily. Provider, Historical   dilTIAZem (CARDIZEM) 30 mg tablet Take 1 Tab by mouth Before breakfast, lunch, and dinner. Patient taking differently: Take 30 mg by mouth daily.  Daily 4/14/20   Staci Martin MD   acetaminophen (TYLENOL) 325 mg tablet Take 650 mg by mouth every eight (8) hours as needed for Pain. Provider, Historical   dextran 70/hypromellose (ARTIFICIAL TEARS, PF, OP) Apply 2 Drops to eye as needed for Other (both eyes for dryness). Provider, Historical   diphenhydrAMINE (BENADRYL) 25 mg capsule Take 25 mg by mouth nightly as needed for Itching. Provider, Historical   tamsulosin (FLOMAX) 0.4 mg capsule Take 0.4 mg by mouth every evening. Other, MD Blayne       Allergy  Allergies   Allergen Reactions    Aspirin Other (comments)     \"messes my stomach up\"       Physical Exam:   Vital Signs:    Blood pressure (!) 120/57, pulse 81, temperature 97.6 °F (36.4 °C), resp. rate 19, height 5' 7\" (1.702 m), weight 98.6 kg (217 lb 4.8 oz), SpO2 97 %. Body mass index is 34.03 kg/m². O2 Device: Nasal cannula   O2 Flow Rate (L/min): 2 l/min   Temp (24hrs), Av.8 °F (30.4 °C), Min:47.5 °F (8.6 °C), Max:98.6 °F (37 °C)       Intake/Output:   Last shift:       07 -  1900  In: -   Out: 425 [Urine:425]  Last 3 shifts: 04/10 1901 -  0700  In: 1177.5 [I.V.:700]  Out: 1300 [Urine:1300]    Intake/Output Summary (Last 24 hours) at 2021 1305  Last data filed at 2021 0746  Gross per 24 hour   Intake 1177.5 ml   Output 1325 ml   Net -147.5 ml         Physical Exam:   General/Neurology: Alert, awake and oriented. NAD. Head:   NCAT. Eye:   EOM intact. No icterus/pallor/cyanosis. Nose:   No nasal drainage/discharge. Neck:   Trachea midline. Lung: Moderate air entry bilateral equal.  No rales, rhonchi. No wheezing or stridor. No prolonged expiration or accessory muscle use. Heart:   S1 S2 present. No murmur or JVD. Abdomen:  Soft. NT. ND. No palpable masses. Extremities:  No edema. No cyanosis or clubbing. Pulses: 2+ and symmetric in DP. Left thigh swelling and erythema with bruising.     Labs Reviewed:    Recent Results (from the past 12 hour(s))   RENAL FUNCTION PANEL    Collection Time: 21  4:09 AM   Result Value Ref Range    Sodium 136 136 - 145 mmol/L Potassium 3.6 3.5 - 5.5 mmol/L    Chloride 98 (L) 100 - 111 mmol/L    CO2 31 21 - 32 mmol/L    Anion gap 7 3.0 - 18 mmol/L    Glucose 149 (H) 74 - 99 mg/dL    BUN 51 (H) 7.0 - 18 MG/DL    Creatinine 1.67 (H) 0.6 - 1.3 MG/DL    BUN/Creatinine ratio 31 (H) 12 - 20      GFR est AA 49 (L) >60 ml/min/1.73m2    GFR est non-AA 40 (L) >60 ml/min/1.73m2    Calcium 7.1 (L) 8.5 - 10.1 MG/DL    Phosphorus 3.5 2.5 - 4.9 MG/DL    Albumin 2.1 (L) 3.4 - 5.0 g/dL   CBC WITH AUTOMATED DIFF    Collection Time: 04/12/21  4:09 AM   Result Value Ref Range    WBC 15.7 (H) 4.6 - 13.2 K/uL    RBC 2.59 (L) 4.35 - 5.65 M/uL    HGB 7.6 (L) 13.0 - 16.0 g/dL    HCT 22.8 (L) 36.0 - 48.0 %    MCV 88.0 74.0 - 97.0 FL    MCH 29.3 24.0 - 34.0 PG    MCHC 33.3 31.0 - 37.0 g/dL    RDW 13.5 11.6 - 14.5 %    PLATELET 395 766 - 634 K/uL    MPV 9.4 9.2 - 11.8 FL    NEUTROPHILS 87 (H) 40 - 73 %    LYMPHOCYTES 4 (L) 21 - 52 %    MONOCYTES 6 3 - 10 %    EOSINOPHILS 0 0 - 5 %    BASOPHILS 0 0 - 2 %    ABS. NEUTROPHILS 13.7 (H) 1.8 - 8.0 K/UL    ABS. LYMPHOCYTES 0.6 (L) 0.9 - 3.6 K/UL    ABS. MONOCYTES 1.0 0.05 - 1.2 K/UL    ABS. EOSINOPHILS 0.0 0.0 - 0.4 K/UL    ABS.  BASOPHILS 0.0 0.0 - 0.1 K/UL    DF AUTOMATED     GLUCOSE, POC    Collection Time: 04/12/21  7:43 AM   Result Value Ref Range    Glucose (POC) 164 (H) 70 - 110 mg/dL   HGB & HCT    Collection Time: 04/12/21 11:27 AM   Result Value Ref Range    HGB 7.7 (L) 13.0 - 16.0 g/dL    HCT 23.2 (L) 36.0 - 48.0 %   GLUCOSE, POC    Collection Time: 04/12/21 11:32 AM   Result Value Ref Range    Glucose (POC) 155 (H) 70 - 110 mg/dL               Recent Labs     04/10/21  1415   FIO2I 100   HCO3I 32.4*   PCO2I 68.2*   PHI 7.29*   PO2I 447*       All Micro Results     Procedure Component Value Units Date/Time    COVID-19 RAPID TEST [[id number]] Collected: 04/05/21 1539    Order Status: Completed Specimen: Nasopharyngeal Updated: 04/05/21 1616     Specimen source Nasopharyngeal        COVID-19 rapid test Not detected Comment: Rapid Abbott ID Now       Rapid NAAT:  The specimen is NEGATIVE for SARS-CoV-2, the novel coronavirus associated with COVID-19. Negative results should be treated as presumptive and, if inconsistent with clinical signs and symptoms or necessary for patient management, should be tested with an alternative molecular assay. Negative results do not preclude SARS-CoV-2 infection and should not be used as the sole basis for patient management decisions. This test has been authorized by the FDA under an Emergency Use Authorization (EUA) for use by authorized laboratories. Fact sheet for Healthcare Providers: iTendency.uy  Fact sheet for Patients: iTendency.uy       Methodology: Isothermal Nucleic Acid Amplification                 2/27/21 ECHO   · Left Ventricle: Normal cavity size, wall thickness and systolic function (ejection fraction normal). The estimated EF is 50 - 55%. There is mild (grade 1) left ventricular diastolic dysfunction E'E= 10.05. Poor resolution of endocardial border. Can not comment on wall motion abnormality  · Tricuspid Valve: Tricuspid valve not well visualized. No stenosis. Tricuspid regurgitation is inadequate for estimation of right ventricular systolic pressure      Imaging:  [x]I have personally reviewed the patients chest radiographs images and report with the patient    CT study 4/6/2021  Results from [place] encounter on 04/05/21   CT PELV W CONT    Narrative EXAM: CT of the pelvis    CLINICAL INDICATION/HISTORY: Recent left hip arthroplasty with left hip pain    COMPARISON: CT left hip dated 4/6/2021, left hip radiographs dated 4/11/2021    TECHNIQUE: Axial CT imaging of the pelvis was performed with intravenous  contrast. Multiplanar reformats were generated.  One or more dose reduction  techniques were used on this CT: automated exposure control, adjustment of the  mAs and/or kVp according to patient size, and iterative reconstruction  techniques. The specific techniques used on this CT exam have been documented  in the patient's electronic medical record. Digital Imaging and Communications  in Medicine (DICOM) format image data are available to nonaffiliated external  healthcare facilities or entities on a secured, media free, reciprocally  searchable basis with patient authorization for at least a 12-month period after  this study. _______________    FINDINGS:    PELVIC ORGANS: Mild bladder wall thickening. Limited evaluation of the prostate  secondary to streak artifact from left hip arthroplasty. LYMPH NODES: No enlarged lymph nodes. GASTROINTESTINAL TRACT: Moderate stool burden in the rectum. No bowel  obstruction identified in the lower abdomen or pelvis. VASCULATURE: Aortoiliac atherosclerosis. BONES: Recent left hip arthroplasty. Hardware appears intact. No dislocation. No  acute fracture identified. Soft tissue swelling, hematoma, and gas identified  around the left hip from the recent surgery. More focal organized hematoma near  the left hip on image 57 measures 3.4 cm. Hematoma also seen tracking along the  thigh musculature. OTHER: Small fat-containing left inguinal hernia.    _______________      Impression 1. Recent left hip arthroplasty with no fracture or dislocation identified. Soft  tissue swelling, hematoma, and gas identified around the left hip from the  recent surgery. CT left hip 4/11/2021  IMPRESSION  1. Recent left hip arthroplasty with no fracture or dislocation identified. Soft  tissue swelling, hematoma, and gas identified around the left hip from the  recent surgery.     CTA chest 4/11/2021  IMPRESSION  1.  No pulmonary embolism identified. 2. Chronic right-sided pleural calcifications and chronic pleural collections  which are unchanged compared to prior exams. No new focal consolidation.   Scattered atelectasis and/or scarring in the lungs. Ultrasound legs 4/10/2021  Interpretation Summary  · No evidence of deep vein thrombosis in the right lower extremity. · No evidence of deep vein thrombosis in the left lower extremity. PFT 6/29/20 - FEV1 54% [GOLD CLASS 2], FEV1% 58, %, %, DLCO 67% - moderate COPD with hyperinflation and reduced DLCO      IMPRESSION:   · Acute respiratory failure with hypoxemia and hypercapnia  · COPD exacerbation  · Acute pulmonary edema  · Left hip avascular necrosis  · S/p left hip replacement  · Obesity  · Anemia  ·   ·   Patient Active Problem List   Diagnosis Code    Hypertension I10    COPD (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 ([facility]) J44.9    Chronic renal disease, stage 3, moderately decreased glomerular filtration rate between 30-59 mL/min/1.73 square meter ([facility]) N18.30    Acute exacerbation of chronic obstructive pulmonary disease (COPD) ([facility] J44.1    Debility R53.81    Chronic respiratory failure with hypoxia ([facility]) J96.11    Tobacco dependence F17.200    Left hip pain M25.552    PAF (paroxysmal atrial fibrillation) ([facility]) I48.0    Avascular necrosis of bone of left hip ([facility]) M87.052    Acute respiratory failure with hypoxia and hypercapnia ([facility]) J96.01, J96.02    Obesity E66.9    Acute pulmonary edema ([facility]) J81.0    Anemia D64.9       CODE STATUS-partial code -no CPR or chest compressions; okay for intubation. RECOMMENDATIONS:   Respiratory:   Respiratory status has improved. No wheezing, dyspnea at rest.  CTA chest 4/11/2021: No PE. Chronic left calcification, unchanged. Continue bronchodilators-continue Brovana twice daily, Pulmicort twice daily, ipratropium 4 times daily. (Home regimen: Stiolto Respimat 2 puff daily, albuterol as needed). Steroids: Continue Solu-Medrol 20 mg IV every 6 hours. Titrate off quickly recent hip surgery.   Continue nasal cannula oxygen supplementation-currently at 2 L (patient is not on oxygen at home); keep oxygen saturation greater than 91%; incentive spirometry as tolerated. BiPAP as needed during daytime and nighttime. Continue to monitor patient for respiratory status, and low threshold for intubation. CVS: Stable hemodynamics; continue Cardizem 30 mg 3 times a day. Lasix 20 mg in the morning daily. Continue Norvasc, Cardizem. Prior echocardiogram showed normal LV function. Troponins 4/10-negative x 2. ID: No fever but leukocytosis which is improving. Hemodynamically stable. Status post left hip replacement. Antibiotics: Continue Zosyn. DC vancomycin in a.m if leukocytosis continues to improve and no sign of action. Rapid Covid test negative this admission; SARS-CoV-2 PCR repeated 4/9 for rehab placement-negative. Renal: Monitor renal function; patient has CKD-creatinine 1.67 today. Patient received contrast for CTA; monitor creatinine. Nephrology on case. GI: No active issues; oral diet as tolerated. Endo: Monitor blood sugars. Continue Humalog sliding scale. Neuro: Normal mentation. Hem: Monitor hemoglobin and platelets; hemoglobin drifting down but stable now; s/p left hip replacement; hold Lovenox DVT prophylaxis; monitor H&H every 6 hours; transfuse if hemoglobin less than 7 g/dL. MS: S/p left hip replacement for avascular necrosis; persistent pain and swelling. CT left hip 4/11/2021: Soft tissue swelling, hematoma, and gas identified around the left hip from the recent surgery. Defer to orthopedics. Vasc: Ultrasound legs negative for DVT. DVT and GI prophylaxis: SCDs and Pepcid. Discussed with hospitalist regarding management plans, patient, RN, MDR. High complexity review and management; critical care time 32 minutes excluding discussion of procedures. Wife Megan [facility] -[phone]. Transfer to telemetry. Once transferred, [facility]. Call us with any questions.        ·Please note: Voice-recognition software may have been used to generate this report, which may have resulted in some phonetic-based errors in grammar and contents. Even though attempts were made to correct all the mistakes, some may have been missed, and remained in the body of the document.     Kathryn Bay MD   4/12/2021

## 2021-04-12 NOTE — PROGRESS NOTES
Pharmacy Dosing Services: Famotidine    Famotidine 20mg PO BID was automatically dose-adjusted to Famotidine 20mg PO Daily per THE [facility] P&T Committee Protocol, with respect to renal function. Indication: Symptomatic GERD    Pt Weight:   Wt Readings from Last 1 Encounters:   04/09/21 98.6 kg (217 lb 4.8 oz)         Serum Creatinine Lab Results   Component Value Date/Time    Creatinine 1.67 (H) 04/12/2021 04:09 AM    Creatinine, POC 1.3 10/30/2019 03:27 PM       Creatinine Clearance Estimated Creatinine Clearance: 41.4 mL/min (A) (based on SCr of 1.67 mg/dL (H)). BUN Lab Results   Component Value Date/Time    BUN 51 (H) 04/12/2021 04:09 AM    BUN, POC 30 (H) 10/30/2019 03:27 PM           Pharmacy to continue to monitor patient daily and make dosage adjustments based upon changing renal function.     Jazz Ingram, PharmD  [phone]

## 2021-04-12 NOTE — PROGRESS NOTES
Hospitalist Progress Note-critical care note     Patient: Luke Fernandes MRN: [id number]  [facility]: [id number]    YOB: 1945  Age: 66 y.o.   Sex: male    DOA: 4/5/2021 LOS:  LOS: 7 days            Chief complaint: anemia , avn, paf, debility     Assessment/Plan         Hospital Problems  Date Reviewed: 2/27/2021          Codes Class Noted POA    Anemia ICD-10-CM: D64.9  ICD-9-CM: 285.9  4/11/2021 Unknown        Acute respiratory failure with hypoxia and hypercapnia (HCC) ICD-10-CM: J96.01, J96.02  ICD-9-CM: 518.81  4/10/2021 Unknown        Obesity ICD-10-CM: E66.9  ICD-9-CM: 278.00  4/10/2021 Unknown        Acute pulmonary edema ([facility] ICD-10-CM: J81.0  ICD-9-CM: 518.4  4/10/2021 Unknown        Avascular necrosis of bone of left hip ([facility] ICD-10-CM: M87.052  ICD-9-CM: 733.42  4/8/2021 Unknown        Left hip pain ICD-10-CM: M25.552  ICD-9-CM: 719.45  4/5/2021 Unknown        PAF (paroxysmal atrial fibrillation) (HCC) ICD-10-CM: I48.0  ICD-9-CM: 427.31  4/5/2021 Unknown        Debility ICD-10-CM: R53.81  ICD-9-CM: 799.3  7/8/2019 Yes        * (Principal) Acute exacerbation of chronic obstructive pulmonary disease (COPD) ([facility] ICD-10-CM: J44.1  ICD-9-CM: 491.21  2/8/2019 Unknown        Hypertension ICD-10-CM: I10  ICD-9-CM: 401.9  Unknown Yes                Patient was admitted due to hyponatremia and hypokalemia and COPD exacerbation.   also needs left hip surgery he post surgery 4/9/21     Acute COPD exacerbation  Continue improving , off bipap now   Iv steroid, breathing tx, empiric abx        Asbestosis and chronic respiration failure with hypoxia   On home O2 2 L       paf   Continue cardizem   Post op needs theraputic anticoagulation start  Per cardio recommendations      Hypertension: On Cardizem      Hypokalemia and hyponatremia        Fluid overloaded-resolving   dvt negative  Lasix per renal   Echo ef 05-44 % mild systolic dysfunction-done in Feb, 2021     Hearing loss   Having hearing aid communication board      Hip pain -AVN   Post surgery,  Continue pt.ot   Pain control     Anemia   Due to acute bleeding   Received infusion   Continue monitoring, transfuse as needed     Ac was hold due to hematoma        Case discussed with:  []Patient  []Family  [x]Nursing  []Case Management  DVT Prophylaxis:  []Lovenox  []Hep SQ  [x]SCDs  []Coumadin   []On Heparin gtt    Disposition :tbd,   Review of systems:    General: No fevers or chills. Cardiovascular: No chest pain or pressure. No palpitations. Pulmonary: No shortness of breath. Gastrointestinal: No nausea, vomiting. Msk: hip pain     Vital signs/Intake and Output:  Visit Vitals  BP (!) 120/57   Pulse 81   Temp 97.6 °F (36.4 °C)   Resp 19   Ht 5' 7\" (1.702 m)   Wt 98.6 kg (217 lb 4.8 oz)   SpO2 97%   BMI 34.03 kg/m²     Current Shift:  04/12 0701 - 04/12 1900  In: -   Out: 425 [Urine:425]  Last three shifts:  04/10 1901 - 04/12 0700  In: 1177.5 [I.V.:700]  Out: 1300 [Urine:1300]    Physical Exam:  General: WD, WN. Alert, cooperative, no acute distress    HEENT: NC, Atraumatic. PERRLA, anicteric sclerae. Lungs: CTA Bilaterally. No Wheezing/Rhonchi/Rales. Heart:  Regular  rhythm,  No murmur, No Rubs, No Gallops  Abdomen: Soft, Non distended, Non tender. +Bowel sounds,   Extremities: No c/c left hip swelling and hematoma noted   Psych:   Not anxious or agitated. Neurologic:  No acute neurological deficit.              Labs: Results:       Chemistry Recent Labs     04/12/21  0409 04/11/21  0405 04/10/21  1440   * 147* 183*    136 137   K 3.6 3.9 3.8   CL 98* 98* 98*   CO2 31 30 31   BUN 51* 47* 39*   CREA 1.67* 1.65* 1.59*   CA 7.1* 7.3* 7.7*   AGAP 7 8 8   BUCR 31* 28* 25*   AP  --   --  68   TP  --   --  5.6*   ALB 2.1* 2.2* 2.9*   GLOB  --   --  2.7   AGRAT  --   --  1.1      CBC w/Diff Recent Labs     04/12/21  1127 04/12/21  0409 04/11/21 1954 04/11/21 0405 04/11/21  0405 04/10/21  1440   WBC  --  15.7*  --   --  19.6* 26.2* RBC  --  2.59*  --   --  2.78* 3.47*   HGB 7.7* 7.6* 7.9*   < > 8.1* 10.1*   HCT 23.2* 22.8* 23.5*   < > 24.2* 30.7*   PLT  --  241  --   --  242 430*   GRANS  --  87*  --   --  89* 88*   LYMPH  --  4*  --   --  3* 5*   EOS  --  0  --   --  0 0    < > = values in this interval not displayed. Cardiac Enzymes Recent Labs     04/10/21  2005 04/10/21  1440    113   CKND1 2.5 1.6      Coagulation No results for input(s): PTP, INR, APTT, INREXT in the last 72 hours. Lipid Panel No results found for: CHOL, CHOLPOCT, CHOLX, CHLST, CHOLV, 756165, HDL, HDLP, LDL, LDLC, DLDLP, 495476, VLDLC, VLDL, TGLX, TRIGL, TRIGP, TGLPOCT, CHHD, CHHDX   BNP No results for input(s): BNPP in the last 72 hours. Liver Enzymes Recent Labs     04/12/21  0409 04/10/21  1440 04/10/21  1440   TP  --   --  5.6*   ALB 2.1*   < > 2.9*   AP  --   --  68    < > = values in this interval not displayed. Thyroid Studies Lab Results   Component Value Date/Time    TSH 3.06 04/05/2021 01:30 PM        Procedures/imaging: see electronic medical records for all procedures/Xrays and details which were not copied into this note but were reviewed prior to creation of Plan    Xr Hip Lt W Or Wo Pelv 2-3 Vws    Result Date: 4/11/2021  EXAM: 2 views left hip CLINICAL INDICATION/HISTORY: Left hip pain COMPARISON: 4/5/2021 _______________ FINDINGS: Status post left arthroplasty. Hardware is intact and aligned. No fracture or dislocation. Soft tissue swelling and gas around the left hip from the recent surgery. _______________     1. Status post left hip arthroplasty with no fracture or dislocation identified. Soft tissue swelling and gas identified around the left hip from recent surgery. Xr Hip Lt W Or Wo Pelv 2-3 Vws    Result Date: 4/5/2021  EXAM: LEFT HIP RADIOGRAPHS CLINICAL INDICATION/HISTORY: left hip pain, difficulty ambulating -Additional: None COMPARISON: Supine with 2/26/2021 TECHNIQUE: AP pelvis and frog-leg lateral view of left hip. _______________ FINDINGS: BONES: Severe left hip osteoarthrosis with femoral head remodeling and collapse. No evidence of acute displaced fracture or dislocation. SOFT TISSUES: Unremarkable. _______________     Severe left hip osteoarthrosis with femoral head collapse/remodeling. Sclerosis and lucency in femoral head may reflect collapse/remodeling versus AVN. Consider MRI in the appropriate clinical setting. No evidence of acute fracture or dislocation. Xr Knee Lt Max 2 Vws    Result Date: 4/5/2021  HISTORY: -Provided on order: pain, difficulty ambulating -Additional: None Technique: 2 views of left knee are presented for interpretation. Comparison study: none. Findings: The bones are osteopenic. There is no plain film evident fracture or dislocation. No radiopaque foreign body or significant arthropathy. No significant arthropathy. No plain film evident knee joint effusion is seen. 1. No acute bony abnormality is identified by plain films. Intrinsic knee ligamentous, meniscal and cartilaginous integrity can be best evaluated by routine knee MRI if clinically warranted. Cta Chest W Or W Wo Cont    Result Date: 4/11/2021  EXAM: CTA chest CLINICAL INDICATION/HISTORY: Shortness of breath COMPARISON: Chest radiograph dated 4/10/2021 chest CTA dated 10/31/2019 TECHNIQUE: Axial CT imaging from the thoracic inlet through the diaphragm with intravenous contrast. Coronal and sagittal MIP reformats were generated. One or more dose reduction techniques were used on this CT: automated exposure control, adjustment of the mAs and/or kVp according to patient size, and iterative reconstruction techniques. The specific techniques used on this CT exam have been documented in the patient's electronic medical record.  Digital Imaging and Communications in Medicine (DICOM) format image data are available to nonaffiliated external healthcare facilities or entities on a secured, media free, reciprocally searchable basis with patient authorization for at least a 12-month period after this study. _______________ FINDINGS: EXAM QUALITY: Adequate PULMONARY ARTERIES: No pulmonary embolism identified. MEDIASTINUM: Normal heart size. No aortic dissection. Atherosclerotic calcifications in the aorta and coronary arteries. No pericardial effusion. LUNGS: Scattered atelectasis and scarring. No focal consolidation. No suspicious nodules or masses. AIRWAY: Normal. PLEURA: Chronic right-sided pleural calcifications with chronic pleural collections. No pneumothorax. LYMPH NODES: No enlarged nodes. UPPER ABDOMEN: Calcified granuloma in the liver. Right upper pole renal cyst is unchanged from 10/31/2019. No follow-up imaging necessary. BONES: No acute or aggressive osseous abnormalities identified. OTHER: None. _______________     1. No pulmonary embolism identified. 2. Chronic right-sided pleural calcifications and chronic pleural collections which are unchanged compared to prior exams. No new focal consolidation. Scattered atelectasis and/or scarring in the lungs. Ct Pelv W Cont    Result Date: 4/11/2021  EXAM: CT of the pelvis CLINICAL INDICATION/HISTORY: Recent left hip arthroplasty with left hip pain COMPARISON: CT left hip dated 4/6/2021, left hip radiographs dated 4/11/2021 TECHNIQUE: Axial CT imaging of the pelvis was performed with intravenous contrast. Multiplanar reformats were generated. One or more dose reduction techniques were used on this CT: automated exposure control, adjustment of the mAs and/or kVp according to patient size, and iterative reconstruction techniques. The specific techniques used on this CT exam have been documented in the patient's electronic medical record.  Digital Imaging and Communications in Medicine (DICOM) format image data are available to nonaffiliated external healthcare facilities or entities on a secured, media free, reciprocally searchable basis with patient authorization for at least a 12-month period after this study. _______________ FINDINGS: PELVIC ORGANS: Mild bladder wall thickening. Limited evaluation of the prostate secondary to streak artifact from left hip arthroplasty. LYMPH NODES: No enlarged lymph nodes. GASTROINTESTINAL TRACT: Moderate stool burden in the rectum. No bowel obstruction identified in the lower abdomen or pelvis. VASCULATURE: Aortoiliac atherosclerosis. BONES: Recent left hip arthroplasty. Hardware appears intact. No dislocation. No acute fracture identified. Soft tissue swelling, hematoma, and gas identified around the left hip from the recent surgery. More focal organized hematoma near the left hip on image 57 measures 3.4 cm. Hematoma also seen tracking along the thigh musculature. OTHER: Small fat-containing left inguinal hernia. _______________     1. Recent left hip arthroplasty with no fracture or dislocation identified. Soft tissue swelling, hematoma, and gas identified around the left hip from the recent surgery. Ct Hip Lt Wo Cont    Result Date: 4/6/2021  --------------------------------------------------------------------------- <<<<<<<<<           [facility]           >>>>>>>>> --------------------------------------------------------------------------- HISTORY: -From Provider:r/o avn, not candidate for mri -Additional: None COMPARISON EXAMINATIONS:   None. TECHNIQUE:   CT of the left hip without intravenous contrast administration. Coronal and sagittal reformats were generated and reviewed. One or more dose reduction techniques were used on this CT: automated exposure control, adjustment of the mAs and/or kVp according to patient size, and iterative reconstruction techniques.   The specific techniques used on this CT exam have been documented in the patient's electronic medical record. --------------------------     FINDINGS    -------------------------- OSSEOUS STRUCTURES:  Left hip: There is irregular subchondral lucency and cortical irregularity with slight flattening/collapse of the left femoral head. Severe degenerative changes in the right hip with severe joint space narrowing, subchondral cysts and slight marginal spurring. Soft tissue thickening about the joint space suggesting hip joint effusion. Visualized bony pelvis and proximal femur: Degenerative changes in the SI joints and spine. Relatively severe central and foraminal stenosis at L4/5 and to lesser degree at L3/4. Visualized pelvic soft tissues: LYMPH NODES:  Subcentimeter short axis left groin lymph nodes are present. [ ] GI TRACT:  Colonic diverticulosis, without CT evidence diverticulitis. Feculent distention of the rectum. PELVIC ORGANS:  The visualized portion of the bladder is unremarkable. VASCULATURE:  Atherosclerotic calcification in visualized left iliac and femoral arteries. OTHER:   No ascites or free intraperitoneal air in visualized extent. Fat-containing left inguinal hernia. Fatty change in the gluteal musculature bilaterally. Subcutaneous stranding/edema along left more than right lateral flank. ---------------------------------------------------------------------------    --------------------------------------------------------------------------- 1. Subchondral serpiginous lucencies in the weightbearing left femoral head suspicious for avascular necrosis, with associated cortical irregularity in keeping with flattening/collapse. Suspected left hip joint effusion. 2. Relatively severe mild degenerative changes in the left hip. Mild degenerative changes in the right hip. Degenerative changes in the SI joints and spine. In the setting of radiculopathy, routine lumbar spine MRI could best further assess. Xr Chest Port    Result Date: 4/10/2021  EXAM: PORTABLE CHEST HISTORY: Chest pain and shortness of breath. COMPARISON: 4/5/2021 TECHNIQUE: Single portable view.  _______________ FINDINGS: SUPPORT DEVICES: None HEART AND MEDIASTINUM: Cardiac size is normal. Mediastinal contours are normal. Normal pulmonary vasculature. LUNGS AND PLEURAL SPACES: Right base pleuroparenchymal scarring without change. Multiple areas of right pleural calcification without change. BONES AND SOFT TISSUES: Bony structures are normal. Chronic elevation right hemidiaphragm. _______________     No evidence of active pulmonary process. Chronic right basilar pleuroparenchymal scarring and multiple right sided pleural plaques without change. Xr Chest Port    Result Date: 4/5/2021  HISTORY: -Provided with order: SOB -Additional: Bilateral lower extremity swelling. Technique : AP PORTABLE CHEST Comparison : 02/28/21 FINDINGS: Patient is rotated to the right. The chin obscures the upper thorax. There is mild motion degradation and external artifact which additionally limits visibility. HEART AND MEDIASTINUM: Unremarkable. LUNGS AND PLEURAL SPACES: Calcified pleural plaques present on the right. Vague opacity in the right infrahilar region is less evident than the prior, but degraded by motion. There is stable elevation of the right diaphragm. BONY THORAX AND SOFT TISSUES: No acute osseous abnormality. Improved aeration with mild, less evident ill-defined opacity in the right infrahilar region, which can reflect chronic atelectasis or infiltrate. No additional change. Xr Hip Su Hoot Or Wo Pelv  1 Vw    Result Date: 4/9/2021  EXAM:LEFT HIP HISTORY: Left hip pain. COMPARISON: 4/5/2021 TECHNIQUE: Single AP view _______________ FINDINGS: BONES: Left hip total arthroplasty with prosthetic components properly positioned and aligned on the single limited frontal view. No additional bony abnormality. SOFT TISSUES: Expected reactive and emphysematous changes in soft tissues adjacent to left hip. _______________     Normal postoperative appearance left hip arthroplasty with expected postoperative changes in adjacent soft tissues. Echo Stress    Addendum Date: 4/8/2021    · Baseline ECG: Normal EKG.  · Stress test: Negative stress test. Exercise stress test: EKG stress test results correlate with a low risk of inducible myocardial ischemia. · Echo: Normal stress echocardiogram. · Technically difficult study due to severe COPD and suboptimal echocardiographic windows in spite of using definity contrast reduces accurarcy of test.  Overall Normal Dobutamine stress echocardiogram without evidence of ischemia. Resting EF 55% Peak dubatminestress EF 75%. Discussed with patient. Result Date: 4/8/2021  · Baseline ECG: Normal EKG. · Stress test: Negative stress test. Exercise stress test: EKG stress test results correlate with a low risk of inducible myocardial ischemia. · Echo: Normal stress echocardiogram. · Technically difficult study due to severe COPD and suboptimal echocardiographic windows in spite of using definity contrast reduces accurarcy of test.  Overall Normal Dobutamine stress echocardiogram without evidence of ischemia. Resting EF 55% Peak dubatminestress EF 75%. Discussed with patient. Duplex Lower Ext Venous Bilat    Result Date: 4/12/2021  · No evidence of deep vein thrombosis in the right lower extremity. · No evidence of deep vein thrombosis in the left lower extremity. Duplex Lower Ext Venous Bilat    Result Date: 4/5/2021  · No evidence of deep vein thrombosis in the right lower extremity veins assessed. · No evidence of deep vein thrombosis in the left lower extremity veins assessed.         Enmanuel Wright MD

## 2021-04-12 NOTE — PROGRESS NOTES
Problem: Self Care Deficits Care Plan (Adult)  Goal: *Acute Goals and Plan of Care (Insert Text)  Description: Initial Occupational Therapy Goals (4/12/2021) Within 7 day(s):    1. Patient will perform grooming standing sinkside with CGA for increased independence with ADLs. 2. Patient will perform LB dressing with min A & A/E PRN for increased independence with ADLs. 3. Patient will perform toilet transfer with CGA for increased independence with ADLs. 4. Patient will perform all aspects of toileting with CGA for increased independence with ADLs. 5. Patient will independently apply energy conservation techniques with 1 verbal cue(s)for increased independence with ADLs. 6. Patient will perform bathroom mobility with CGA for increased independence/safety with ADLs. Outcome: Progressing Towards Goal   OCCUPATIONAL THERAPY EVALUATION    Patient: Smith Pineda (73 y.o. male)  Date: 4/12/2021  Primary Diagnosis: Acute exacerbation of chronic obstructive pulmonary disease (COPD) ([facility]) [J44.1]  Procedure(s) (LRB):  HIP ARTHROPLASTY TOTAL ANTERIOR APPROACH - WITH C-ARM, (Left) 3 Days Post-Op   Precautions: Fall, WBAT  PLOF: Pt grossly mod I PTA, pt owns hip kit at home and reports spouse assists with lower body ADLs    ASSESSMENT AND RECOMMENDATIONS:  Based on the objective data described below, the patient presents with decreased LLE decreased ROM and strength affecting LE ADLs. Pt found supine in bed, pt reporting pain 10/10, pt is very [place]. Reviewed proper body mechanics/anterior hip precautions with pt, pt verbalized understanding. Pt sat up to EOB with min A for LLE, and additional time to complete. Pt demonstrated KUMAR and required frequent rest breaks, SpO2 remained in 90s throughout session. Pt performed STS with min A, upon standing noted bed pad soiled, pt stood ~ 1 minute and returned to seated with CGA. Pt performed STS with CGA the second trial, clean pad placed on bed.  Pt was able to take ~4 side steps to L, and sat back down. Pt was able to returned to supine with CGA. Pt doffed soiled gown and donned clean gown with CGA and assist to manage lines. Pt left supine in bed with HOB elevated, call bell/phone within reach, ice applied to L hip. Patient will benefit from skilled Occupational Therapy intervention to maximize safety/independence with ADLs at d/c.    Education: Reviewed home safety, body mechanics, importance of moving every hour to prevent joint stiffness, role of ice for edema/pain control, Rolling Walker management/safety, and adaptive dressing techniques with patient verbalizing  understanding at this time     Patient will benefit from skilled intervention to address the above impairments. Patient's rehabilitation potential is considered to be Good  Factors which may influence rehabilitation potential include:    []             None noted  []             Mental ability/status  []             Medical condition  []             Home/family situation and support systems  []             Safety awareness  [x]             Pain tolerance/management  []             Other:        PLAN :  Recommendations and Planned Interventions:   [x]               Self Care Training                  [x]      Therapeutic Activities  [x]               Functional Mobility Training   []      Cognitive Retraining  []               Therapeutic Exercises           [x]      Endurance Activities  []               Balance Training                    []      Neuromuscular Re-Education  []               Visual/Perceptual Training     [x]      Home Safety Training  [x]               Patient Education                   [x]      Family Training/Education  []               Other (comment):    Frequency/Duration: Patient will be followed by Occupational Therapy 1-2 times per day/4-7 days per week to address goals. Discharge Recommendations: Skilled nursing facility  Further Equipment Recommendations for Discharge:  To Be Determined (TBD) at next level of care     SUBJECTIVE:   Patient stated I want to do anything I need to get better.     OBJECTIVE DATA SUMMARY:     Past Medical History:   Diagnosis Date    Brain aneurysm     Cancer ([facility] [address]     prostate    COPD (chronic obstructive pulmonary disease) ([facility] [address]     Hypertension     Nocturia     Pneumonia     Radiation effect      Past Surgical History:   Procedure Laterality Date    HX HERNIA REPAIR       Barriers to Learning/Limitations: yes;  sensory deficits-vision/hearing/speech and physical  Compensate with: visual, verbal, tactile, kinesthetic cues/model    Home Situation/Prior Level of Function:   Home Situation  Home Environment: Trailer/mobile home  # Steps to Enter: 6  One/Two Story Residence: One story  Living Alone: No  Support Systems: Spouse/Significant Other/Partner  Patient Expects to be Discharged to[de-identified] Skilled nursing facility  Current DME Used/Available at Home: Walker, rolling, Adaptive dressing aides  []  Right hand dominant   []  Left hand dominant    Cognitive/Behavioral Status:  Neurologic State: Alert  Orientation Level: Oriented X4  Cognition: Follows commands; Appropriate for age attention/concentration  Safety/Judgement: Awareness of environment; Insight into deficits    Skin: L hip incision w/ Mepilex   Edema: compression hose in place & applied ice     Coordination: BUE  Coordination: Generally decreased, functional  Fine Motor Skills-Upper: Left Intact; Right Intact    Gross Motor Skills-Upper: Left Intact; Right Intact    Balance:  Sitting: Intact  Standing: Intact; With support    Strength: BUE  Strength: Generally decreased, functional    Tone & Sensation:BUE  Sensation: Intact    Range of Motion: BUE  AROM: Generally decreased, functional    Functional Mobility and Transfers for ADLs:  Bed Mobility:  Rolling: Minimum assistance  Supine to Sit: Minimum assistance; Additional time  Sit to Supine: Contact guard assistance  Scooting: Contact guard assistance  Transfers:  Sit to Stand: Minimum assistance;Contact guard assistance(vc)    ADL Assessment:  Feeding: Modified independent  Oral Facial Hygiene/Grooming: Supervision(seated)  Bathing: Maximum assistance  Upper Body Dressing: Contact guard assistance  Lower Body Dressing: Total assistance  Toileting: Moderate assistance    ADL Intervention:  Feeding  Feeding Assistance: Modified independent  Drink to Mouth: Modified independent    Upper Body Dressing Assistance  Dressing Assistance: Contact guard assistance  Hospital Gown: Contact guard assistance    Lower Body Dressing Assistance  Dressing Assistance: Total assistance(dependent)  Socks: Total assistance (dependent)  Leg Crossed Method Used: No  Position Performed: Seated edge of bed    Cognitive Retraining  Safety/Judgement: Awareness of environment; Insight into deficits    Pain:  Pain level pre-treatment: 10/10  Pain level post-treatment: 8/10  Pain Intervention(s): Medication administer by Nursing (see MAR); Rest, Ice, Repositioning   Response to intervention: Nurse notified, see doc flow     Activity Tolerance:   Fair-. Patient able to stand ~2 minute(s). Patient able to complete ADLs with frequent rest breaks. Patient limited by pain, strength, ROM, endurance. Patient unsteady. Please refer to the flowsheet for vital signs taken during this treatment. After treatment:   []  Patient left in no apparent distress sitting up in chair  []  Patient sitting on EOB  [x]  Patient left in no apparent distress in bed  [x]  Call bell left within reach  [x]  Nursing notified  []  Caregiver present  [x]  Ice applied  []  SCD's on while back in bed  [] Bed alarm activated    COMMUNICATION/EDUCATION:   Communication/Collaboration:  [x]       Role of Occupational Therapy in the acute care setting. [x]      Home safety education was provided and the patient/caregiver indicated understanding. [x]      Patient/family have participated as able in goal setting and plan of care.   [x] Patient/family agree to work toward stated goals and plan of care. []      Patient understands intent and goals of therapy, but is neutral about his/her participation. []      Patient is unable to participate in plan of care at this time. Thank you for this referral.  Leticia Man OTR/L  Time Calculation: 40 mins    Eval Complexity: History: MEDIUM Complexity : Expanded review of history including physical, cognitive and psychosocial  history ; Examination: MEDIUM Complexity : 3-5 performance deficits relating to physical, cognitive , or psychosocial skils that result in activity limitations and / or participation restrictions; Decision Making:MEDIUM Complexity : Patient may present with comorbidities that affect occupational performnce.  Miniml to moderate modification of tasks or assistance (eg, physical or verbal ) with assesment(s) is necessary to enable patient to complete evaluation

## 2021-04-12 NOTE — DIABETES MGMT
GLYCEMIC CONTROL PROGRESS NOTE:    - chart reviewed, no known h/o DM  - Solumedrol 20 mg Q6 hours, sterios associated hyperglycemia  - BG in target range ICU: 140-180 mg/dL  - TDD = 4 units - Humalog Normal Insulin Sensitivity Corrective Coverage, recommend continue      Recent Glucose Results:   Lab Results   Component Value Date/Time     (H) 04/12/2021 04:09 AM    GLUCPOC 164 (H) 04/12/2021 07:43 AM    GLUCPOC 176 (H) 04/11/2021 04:35 PM    GLUCPOC 163 (H) 04/11/2021 11:53 AM         Gisel Mariano MS, RN, CDE  Glycemic Control Team  [phone]  Pager [phone] (M-TH 8:00-4:30P)  *After Hours pager [phone]

## 2021-04-12 NOTE — PROGRESS NOTES
conducted a follow up consultation and spiritual assessment for Patrick Arango, who is a 66 y. o.,male. Patient is very hard of hearing. We are working to get a new battery for his hearing aid. Patient was weepy and asked for prayer. Continued the relationship of care and support. Listened empathically. Offered prayer and assurance of continued prayer on patients behalf. Chart reviewed. Patient expressed gratitude for Spiritual Care visit. Patient was reviewed in ICU Interdisciplinary Rounds. Chaplains will continue to follow and will provide pastoral care as needed or requested.  recommends bedside caregivers page the  on duty if patient shows signs of acute spiritual or emotional distress. [address] [facility] [address].    Board Certified   [phone] - Office

## 2021-04-12 NOTE — PROGRESS NOTES
Problem: Mobility Impaired (Adult and Pediatric)  Goal: *Acute Goals and Plan of Care (Insert Text)  Description: Physical Therapy Goals   Initiated 4/10/2021 and to be accomplished within 3-5 day(s)  1. Patient will move from supine <> sit with S in prep for out of bed activity and change of position. 2.  Patient will perform sit<> stand with S with LRAD in prep for transfers/ambulation. 3.  Patient will transfer from bed <> chair with S with LRAD for time up in chair for completion of ADL activity. 4.  Patient will ambulate >50 feet with CGA/LRAD for improved functional mobility at discharge. 5.  Patient will ascend/descend 3-5 stairs with handrails with minimal assistance/contact guard assist for home re-entry as needed. Outcome: Progressing Towards Goal  physical Therapy TREATMENT    Patient: Ed Gray (23 y.o. male)  Date: 4/11/2021  Diagnosis: Acute exacerbation of chronic obstructive pulmonary disease (COPD) ([facility]) [J44.1] Acute exacerbation of chronic obstructive pulmonary disease (COPD) ([facility])  Procedure(s) (LRB):  HIP ARTHROPLASTY TOTAL ANTERIOR APPROACH - WITH C-ARM, (Left) 2 Days Post-Op  Precautions: Fall, WBAT   Chart, physical therapy assessment, plan of care and goals were reviewed. ASSESSMENT:  Pt tolerates exercise, but performed with decreased quad control. AAROM used to move through ROM and facilitate muscular activity. Standing transfers are fair, with decresase of assist requirement on each of 5. Advancing of standing tolerance to incorporate amb. SOB leads to frequent rest break, but pt maintains 90-97% SPO2 on RA. 3L used prior to and post session. Report given to RN and Pulmo.    Progression toward goals:  [x]      Improving appropriately and progressing toward goals  []      Improving slowly and progressing toward goals  []      Not making progress toward goals and plan of care will be adjusted     PLAN:  Patient continues to benefit from skilled intervention to address the above impairments. Continue treatment per established plan of care. Discharge Recommendations:  Shree Wiley  Further Equipment Recommendations for Discharge:  bedside commode, gait belt, and rolling walker     SUBJECTIVE:   Patient stated I'm not in any pain, until I move it.     OBJECTIVE DATA SUMMARY:   Critical Behavior:  Neurologic State: Alert  Orientation Level: Oriented X4  Functional Mobility Training:  Bed Mobility:  Rolling: Minimum assistance  Supine to Sit: Minimum assistance  Sit to Supine: Minimum assistance  Scooting: Minimum assistance  Transfers:  Sit to Stand: Minimum assistance  Stand to Sit: Minimum assistance  Balance:  Sitting: Intact  Standing: Intact; With support  Ambulation/Gait Training:  Distance (ft): 20 Feet (ft)  Assistive Device: Gait belt;Walker, rolling  Ambulation - Level of Assistance: Maximum assistance  Gait Abnormalities: Decreased step clearance;Shuffling gait; Step to gait; Toe walking  Left Side Weight Bearing: As tolerated  Base of Support: Shift to right  Stance: Right increased;Time  Speed/[place]: Delayed  Step Length: Left shortened;Right shortened  Swing Pattern: Left asymmetrical;Right asymmetrical  Therapeutic Exercises:       EXERCISE   Sets   Reps   Active Active Assist   Passive Self ROM   Comments   Ankle Pumps 1 20  [x] [] [] []    Quad Sets/Glut Sets 1 10 [] [x] [] []    Hamstring Sets   [] [] [] []    Short Arc Quads 1 10 [] [x] [] []    Heel Slides 1 10 [] [x] [] []    Straight Leg Raises   [] [] [] []    Hip Abd/Add 1 10 [x] [] [] []    Long Arc Quads 1 10 [x] [] [] []    Seated Marching 1 10 [] [x] [] []    Standing Marching 1 5 [x] [] [] []       [] [] [] []        Pain:  Pain Scale 1: Numeric (0 - 10)  Pain Intensity 1: 0  Pain out: 0  Activity Tolerance:   Fair+  Please refer to the flowsheet for vital signs taken during this treatment.   After treatment:   [] Patient left in no apparent distress sitting up in chair  [x] Patient left in no apparent distress in bed  [x] Call bell left within reach  [x] Nursing notified  [] Caregiver present  [] Bed alarm activated      Escobar Omalley PTA   Time Calculation: 43 mins

## 2021-04-13 LAB
ALBUMIN SERPL-MCNC: 2.3 G/DL (ref 3.4–5)
ANION GAP SERPL CALC-SCNC: 8 MMOL/L (ref 3–18)
BASOPHILS # BLD: 0 K/UL (ref 0–0.1)
BASOPHILS NFR BLD: 0 % (ref 0–2)
BUN SERPL-MCNC: 51 MG/DL (ref 7–18)
BUN/CREAT SERPL: 34 (ref 12–20)
CALCIUM SERPL-MCNC: 7.1 MG/DL (ref 8.5–10.1)
CHLORIDE SERPL-SCNC: 100 MMOL/L (ref 100–111)
CO2 SERPL-SCNC: 31 MMOL/L (ref 21–32)
CREAT SERPL-MCNC: 1.51 MG/DL (ref 0.6–1.3)
DIFFERENTIAL METHOD BLD: ABNORMAL
EOSINOPHIL # BLD: 0 K/UL (ref 0–0.4)
EOSINOPHIL NFR BLD: 0 % (ref 0–5)
ERYTHROCYTE [DISTWIDTH] IN BLOOD BY AUTOMATED COUNT: 13.7 % (ref 11.6–14.5)
GLUCOSE BLD STRIP.AUTO-MCNC: 171 MG/DL (ref 70–110)
GLUCOSE BLD STRIP.AUTO-MCNC: 178 MG/DL (ref 70–110)
GLUCOSE BLD STRIP.AUTO-MCNC: 189 MG/DL (ref 70–110)
GLUCOSE BLD STRIP.AUTO-MCNC: 250 MG/DL (ref 70–110)
GLUCOSE SERPL-MCNC: 160 MG/DL (ref 74–99)
HCT VFR BLD AUTO: 22 % (ref 36–48)
HCT VFR BLD AUTO: 23.2 % (ref 36–48)
HCT VFR BLD AUTO: 26.1 % (ref 36–48)
HGB BLD-MCNC: 7.6 G/DL (ref 13–16)
HGB BLD-MCNC: 7.8 G/DL (ref 13–16)
HGB BLD-MCNC: 8.7 G/DL (ref 13–16)
LYMPHOCYTES # BLD: 0.4 K/UL (ref 0.9–3.6)
LYMPHOCYTES NFR BLD: 3 % (ref 21–52)
MCH RBC QN AUTO: 29.2 PG (ref 24–34)
MCHC RBC AUTO-ENTMCNC: 33.6 G/DL (ref 31–37)
MCV RBC AUTO: 86.9 FL (ref 74–97)
MONOCYTES # BLD: 0.8 K/UL (ref 0.05–1.2)
MONOCYTES NFR BLD: 5 % (ref 3–10)
NEUTS SEG # BLD: 14.8 K/UL (ref 1.8–8)
NEUTS SEG NFR BLD: 88 % (ref 40–73)
PHOSPHATE SERPL-MCNC: 3.5 MG/DL (ref 2.5–4.9)
PLATELET # BLD AUTO: 244 K/UL (ref 135–420)
PMV BLD AUTO: 9.9 FL (ref 9.2–11.8)
POTASSIUM SERPL-SCNC: 3.5 MMOL/L (ref 3.5–5.5)
RBC # BLD AUTO: 2.67 M/UL (ref 4.35–5.65)
SODIUM SERPL-SCNC: 139 MMOL/L (ref 136–145)
WBC # BLD AUTO: 16.7 K/UL (ref 4.6–13.2)

## 2021-04-13 PROCEDURE — 82962 GLUCOSE BLOOD TEST: CPT

## 2021-04-13 PROCEDURE — 74011000258 HC RX REV CODE- 258: Performed by: INTERNAL MEDICINE

## 2021-04-13 PROCEDURE — 74011000250 HC RX REV CODE- 250: Performed by: INTERNAL MEDICINE

## 2021-04-13 PROCEDURE — 74011250637 HC RX REV CODE- 250/637: Performed by: HOSPITALIST

## 2021-04-13 PROCEDURE — 97530 THERAPEUTIC ACTIVITIES: CPT

## 2021-04-13 PROCEDURE — 74011250637 HC RX REV CODE- 250/637: Performed by: PHYSICIAN ASSISTANT

## 2021-04-13 PROCEDURE — 80069 RENAL FUNCTION PANEL: CPT

## 2021-04-13 PROCEDURE — 85025 COMPLETE CBC W/AUTO DIFF WBC: CPT

## 2021-04-13 PROCEDURE — 94640 AIRWAY INHALATION TREATMENT: CPT

## 2021-04-13 PROCEDURE — 85014 HEMATOCRIT: CPT

## 2021-04-13 PROCEDURE — 74011250637 HC RX REV CODE- 250/637: Performed by: INTERNAL MEDICINE

## 2021-04-13 PROCEDURE — 74011000250 HC RX REV CODE- 250: Performed by: HOSPITALIST

## 2021-04-13 PROCEDURE — 74011250636 HC RX REV CODE- 250/636: Performed by: INTERNAL MEDICINE

## 2021-04-13 PROCEDURE — 74011636637 HC RX REV CODE- 636/637: Performed by: INTERNAL MEDICINE

## 2021-04-13 PROCEDURE — 36415 COLL VENOUS BLD VENIPUNCTURE: CPT

## 2021-04-13 PROCEDURE — 97116 GAIT TRAINING THERAPY: CPT

## 2021-04-13 PROCEDURE — 77010033678 HC OXYGEN DAILY

## 2021-04-13 PROCEDURE — 65660000000 HC RM CCU STEPDOWN

## 2021-04-13 RX ORDER — POTASSIUM CHLORIDE 20 MEQ/1
20 TABLET, EXTENDED RELEASE ORAL
Status: COMPLETED | OUTPATIENT
Start: 2021-04-13 | End: 2021-04-13

## 2021-04-13 RX ADMIN — METHYLPREDNISOLONE SODIUM SUCCINATE 20 MG: 40 INJECTION, POWDER, FOR SOLUTION INTRAMUSCULAR; INTRAVENOUS at 03:05

## 2021-04-13 RX ADMIN — ACETAMINOPHEN 650 MG: 325 TABLET ORAL at 12:07

## 2021-04-13 RX ADMIN — IPRATROPIUM BROMIDE 0.5 MG: 0.5 SOLUTION RESPIRATORY (INHALATION) at 12:36

## 2021-04-13 RX ADMIN — IPRATROPIUM BROMIDE AND ALBUTEROL SULFATE 3 ML: .5; 3 SOLUTION RESPIRATORY (INHALATION) at 01:33

## 2021-04-13 RX ADMIN — DILTIAZEM HYDROCHLORIDE 30 MG: 30 TABLET, FILM COATED ORAL at 17:10

## 2021-04-13 RX ADMIN — IPRATROPIUM BROMIDE 0.5 MG: 0.5 SOLUTION RESPIRATORY (INHALATION) at 16:11

## 2021-04-13 RX ADMIN — Medication 10 ML: at 06:46

## 2021-04-13 RX ADMIN — VANCOMYCIN HYDROCHLORIDE 1500 MG: 10 INJECTION, POWDER, LYOPHILIZED, FOR SOLUTION INTRAVENOUS at 12:30

## 2021-04-13 RX ADMIN — ARFORMOTEROL TARTRATE 15 MCG: 15 SOLUTION RESPIRATORY (INHALATION) at 07:34

## 2021-04-13 RX ADMIN — IPRATROPIUM BROMIDE 0.5 MG: 0.5 SOLUTION RESPIRATORY (INHALATION) at 07:34

## 2021-04-13 RX ADMIN — BUDESONIDE 500 MCG: 0.5 INHALANT RESPIRATORY (INHALATION) at 20:08

## 2021-04-13 RX ADMIN — BUDESONIDE 500 MCG: 0.5 INHALANT RESPIRATORY (INHALATION) at 07:34

## 2021-04-13 RX ADMIN — SENNOSIDES 8.6 MG: 8.6 TABLET, FILM COATED ORAL at 22:09

## 2021-04-13 RX ADMIN — DILTIAZEM HYDROCHLORIDE 30 MG: 30 TABLET, FILM COATED ORAL at 06:46

## 2021-04-13 RX ADMIN — PIPERACILLIN SODIUM AND TAZOBACTAM SODIUM 3.38 G: 3; .375 INJECTION, POWDER, LYOPHILIZED, FOR SOLUTION INTRAVENOUS at 03:05

## 2021-04-13 RX ADMIN — INSULIN LISPRO 2 UNITS: 100 INJECTION, SOLUTION INTRAVENOUS; SUBCUTANEOUS at 06:46

## 2021-04-13 RX ADMIN — FERROUS SULFATE TAB 325 MG (65 MG ELEMENTAL FE) 325 MG: 325 (65 FE) TAB at 17:11

## 2021-04-13 RX ADMIN — DILTIAZEM HYDROCHLORIDE 30 MG: 30 TABLET, FILM COATED ORAL at 12:07

## 2021-04-13 RX ADMIN — PIPERACILLIN AND TAZOBACTAM 3.38 G: 3; .375 INJECTION, POWDER, LYOPHILIZED, FOR SOLUTION INTRAVENOUS at 22:08

## 2021-04-13 RX ADMIN — SENNOSIDES 8.6 MG: 8.6 TABLET, FILM COATED ORAL at 09:05

## 2021-04-13 RX ADMIN — ARFORMOTEROL TARTRATE 15 MCG: 15 SOLUTION RESPIRATORY (INHALATION) at 20:08

## 2021-04-13 RX ADMIN — FUROSEMIDE 20 MG: 10 INJECTION, SOLUTION INTRAMUSCULAR; INTRAVENOUS at 12:07

## 2021-04-13 RX ADMIN — TAMSULOSIN HYDROCHLORIDE 0.4 MG: 0.4 CAPSULE ORAL at 17:13

## 2021-04-13 RX ADMIN — PIPERACILLIN AND TAZOBACTAM 3.38 G: 3; .375 INJECTION, POWDER, LYOPHILIZED, FOR SOLUTION INTRAVENOUS at 09:02

## 2021-04-13 RX ADMIN — METHYLPREDNISOLONE SODIUM SUCCINATE 20 MG: 40 INJECTION, POWDER, FOR SOLUTION INTRAMUSCULAR; INTRAVENOUS at 15:11

## 2021-04-13 RX ADMIN — AMLODIPINE BESYLATE 5 MG: 5 TABLET ORAL at 09:04

## 2021-04-13 RX ADMIN — INSULIN LISPRO 2 UNITS: 100 INJECTION, SOLUTION INTRAVENOUS; SUBCUTANEOUS at 22:09

## 2021-04-13 RX ADMIN — Medication 10 ML: at 15:12

## 2021-04-13 RX ADMIN — POTASSIUM CHLORIDE 20 MEQ: 1500 TABLET, EXTENDED RELEASE ORAL at 17:10

## 2021-04-13 RX ADMIN — METHYLPREDNISOLONE SODIUM SUCCINATE 20 MG: 40 INJECTION, POWDER, FOR SOLUTION INTRAMUSCULAR; INTRAVENOUS at 09:06

## 2021-04-13 RX ADMIN — INSULIN LISPRO 6 UNITS: 100 INJECTION, SOLUTION INTRAVENOUS; SUBCUTANEOUS at 17:10

## 2021-04-13 RX ADMIN — INSULIN LISPRO 2 UNITS: 100 INJECTION, SOLUTION INTRAVENOUS; SUBCUTANEOUS at 12:07

## 2021-04-13 RX ADMIN — FAMOTIDINE 20 MG: 20 TABLET, FILM COATED ORAL at 09:06

## 2021-04-13 RX ADMIN — FUROSEMIDE 20 MG: 20 TABLET ORAL at 09:05

## 2021-04-13 RX ADMIN — PIPERACILLIN AND TAZOBACTAM 3.38 G: 3; .375 INJECTION, POWDER, LYOPHILIZED, FOR SOLUTION INTRAVENOUS at 15:11

## 2021-04-13 RX ADMIN — IPRATROPIUM BROMIDE 0.5 MG: 0.5 SOLUTION RESPIRATORY (INHALATION) at 20:08

## 2021-04-13 RX ADMIN — FERROUS SULFATE TAB 325 MG (65 MG ELEMENTAL FE) 325 MG: 325 (65 FE) TAB at 09:03

## 2021-04-13 NOTE — PROGRESS NOTES
Comprehensive Nutrition Assessment    Type and Reason for Visit: Initial, RD nutrition re-screen/LOS  LOS Day 7    Nutrition Recommendations/Plan:    Continue with current diet (cardiac)   Start Ensure enlive with all meals (provides 350 kcals and 20 g protein per bottle)     Nutrition Assessment:   Nutrition History:      Previous admission with dysphagia issues, which seemed to be resolved. Nutrition Background: Patient presented with acute exacerbation of COPD and hip pain. Patient underwent hip surgery on 4/9. PMH significant for CHD3, HTN  Daily Update:  Spoke with patient who is very hard of hearing. He states he is eating well now and doing fine prior to surgery. He states he had a bad hip and wants to make it better. Showed him pictures of ensure to show drink that would provide extra nutrition for healing, patient understood. Since last visit patient seems to have gained weight per EMR.     Nutrition Related Findings:     Wound Type: Surgical incision    Current Nutrition Therapies:  DIET CARDIAC Regular    Current Intake:   Average Meal Intake: Unable to assess Average Supplement Intake: None ordered      Anthropometric Measures:  Height: 5' 7\" (170.2 cm)  Current Body Wt: 98.6 kg (217 lb 6 oz)(4/9), Weight source: Not specified  BMI: 34, Obese class 1 (BMI 30.0-34.9)     Ideal Body Weight (lbs) (Calculated): 148 lbs (67 kg), 146.9 %  Usual Body Wt:  , Percent weight change:            Edema: LLE: 2+ (4/12/2021  7:58 AM)  RLE: 2+ (4/12/2021  7:58 AM)     Estimated Daily Nutrient Needs:  Energy (kcal/day): 4544-5450 (Kcal/kg(25-30), Weight Used: Ideal(67 kg))  Protein (g/day): 67-80 Weight Used: (Ideal)  Fluid (ml/day):   (1 ml/kcal)    Nutrition Diagnosis:   · Inadequate oral intake related to increased demand for energy/nutrients as evidenced by wounds(NPO for several day for surgery)    Nutrition Interventions:   Food and/or Nutrient Delivery: Continue current diet, Start oral nutrition supplement Coordination of Nutrition Care: Continue to monitor while inpatient    Goals: Active Goal: Intake >75% of nutritional needs within 7 days    Nutrition Monitoring and Evaluation:      Food/Nutrient Intake Outcomes: Food and nutrient intake, Supplement intake       Discharge Planning:     Too soon to determine    Electronically signed by Ad Chau MS, RD, LD on 4/13/2021 at 9:41 AM.  Contact: [phone]

## 2021-04-13 NOTE — PROGRESS NOTES
Discharge Planning: SNF    CM met with the patient and his wife at the bedside to discuss plans for discharge. CM provided the patient's wife with a copy of his recently completed UAI. CM currently working on SNF placement and informed the patient and his wife that he has two accepting facilities and that CM is waiting to hear from [facility] as this was the patient's first choice. The patient denies any additional questions or concerns at this time. CM to follow the patient's progress and be available to assist with discharge planning as needed. CMS referral placed.     Care Management Interventions  Mode of Transport at Discharge: BLS  Transition of Care Consult (CM Consult): Discharge Planning, SNF  Health Maintenance Reviewed: Yes  Physical Therapy Consult: Yes  Occupational Therapy Consult: Yes  Current Support Network: Lives with Spouse  The Plan for Transition of Care is Related to the Following Treatment Goals : HH and physician follow up vs SNF  The Patient and/or Patient Representative was Provided with a Choice of Provider and Agrees with the Discharge Plan?: Yes  Name of the Patient Representative Who was Provided with a Choice of Provider and Agrees with the Discharge Plan: Jaiden Fuentes  Freedom of Choice List was Provided with Basic Dialogue that Supports the Patient's Individualized Plan of Care/Goals, Treatment Preferences and Shares the Quality Data Associated with the Providers?: Yes  Discharge Location  Discharge Placement: Skilled nursing facility(vs [facility])

## 2021-04-13 NOTE — PROGRESS NOTES
0020 -TRANSFER - IN REPORT:    Verbal report received from [address] (name) on Sanya Simms  being received from ICU (unit) for routine progression of care      Report consisted of patients Situation, Background, Assessment and   Recommendations(SBAR). Information from the following report(s) SBAR, Kardex, MAR, Recent Results, Med Rec Status and Cardiac Rhythm SR was reviewed with the receiving nurse. 0100- Pt arrived to room 346 via Wheelchair fromICU. Pt is A & O X 4 and reports no chest pain or SOB PT is [place] his dentures and hearing aids are with him as well as his other belongings. Pt was orientated to room, call bell and unit routines. Tele box 15 placed on pt reading SR. VSS. Pt has Bipap at bedside but refuses to wear it at night. RT at bedside to give a treatment.

## 2021-04-13 NOTE — ADVANCED PRACTICE NURSE
[facility]  Clinical Nurse Specialist Rounding Note      NAME: Fer Wade  MRN: [id number]  AGE: 66 y.o., : 1945  DATE OF ADMISSION: 2021  Rounding Date and Time: 2021 11:09 AM  Attending Provider: Azucena Vazquez MD  Reason for Admission: Shortness of Breath    Primary Diagnosis: Acute exacerbation of chronic obstructive pulmonary disease (COPD) ([facility] [address]   Code Status: Partial Code  Allergies: Allergies   Allergen Reactions    Aspirin Other (comments)     \"messes my stomach up\"       Assessment/Plan:  1. Hx COPD. Pt states that the hip pain started first, then he had more difficulty breathing. Today, he feels better overall. He is short of breath at rest and grunting, but based on previous admissions, this may be his baseline. He states that he has been using his inhalers and nebulizers at home. a. Inpt meds: ipratropium, budesonide, arformoterol, Duo-Neb PRN, Solu-medrol   b. PTA meds: Duo-neb, albuterol inhaler  c. Follow-up - pulmonolgist is Dr. Brodie Gomez, which he says he saw on 3/11 as outpt.   d. Oxygen therapy - uses 2 L/min at home, and is at baseline now.   e. Ambulate as can tolerate. f. Rec: Taper off Solu-medrol and discontinue sliding scale insulin as pt has no Hx DM. 2. Anxious mood r/t pain and. Pain is in left hip, which he feels hasn't improved since surgery. Hematoma, bruising is on his left hip.   a. Hematoma. Educated pt on hematoma, how it causes pain, relationship to bleeding, and how to monitor bleeding with blood draw.  b. Hgb & Hct is currently q6h: 7.8, slightly improved since yesterday. c. Pain: In consideration for pt's age and Beers criteria, be sparing with narcotics. Tylenol is ordered now as PRN. 1. Nursing:  a. Apply ice to site  b. Give tylenol around the clock for pain. d. Communication: Hard of hearing, which makes it difficult to communicate. i.  Spent 30 mins with pt to answer questions and describe plan, and pt had no other questions at this time. e. Rec: Pt asks for anxiolytic, and worries that he may have to start taking them at home. If we give a PRN dose of anxiety medication here, please start with a lower dose in consideration for pt's age. Decrease H&H checks to qDay as it appears stable now. 3. Left hip arthroplasty with Dr. Katie Black on 4/9, due to avascular necrosis of the hip. Avascular necrosis is possibly due to glucocorticoid use, though according to UpToDate, the likelihood of avascular necrosis from prednisone is rare. a. Follow-up: He plans to go to rehab after this and prefers [facility]. CM is following.   b. Bruising & blister around incision site - wound care is following and recommends to cover with mepilex. Visualized under mepliex - today there is no raised area or bulla. The skin is sensitive to touch at this time, so the area can stay covered. c. Dressing - order is active to change as needed with mepilex. The present honeycomb dressing has old dry-appearing blood. d. Ambulate as above.   e. Rec: Discontinue solu-medrol as above. Change left hip dressings. Subjective:    Chief Complaint: left hip pain  Verbatim: \"feels about the same\"    HPI: Denise Bueno is a 66 y.o. male who presents with left hip pain. When he came to the hospital, he was also found with COPD exacerbation. He was diagnosed with avascular necrosis of the left hip. He was cleared by cardiology and pulmonology for surgery. Dr. Katie Black performed the surgery on 4/9. Afterward the surgery, he had more difficulty breathing and was transferred to the ICU. Earlier today, he was returned to the Cardiac-Medical floor. Regarding his COPD, he has had several readmissions within the last year. He was here last time in February for COPD exacerbation. He is tearful today about the combination of his breathing and his hip.  He last followed up with his pulmonologist Dr. Bob Dumont on 3/11, after he was discharged last. Today, he feels that his breathing is not too bad. He is extremely hard of hearing, but is able to read and write to communicate. He is an ex-smoker. Hx:   Past Medical History:   Diagnosis Date    Brain aneurysm     Cancer ([facility] [address]     prostate    COPD (chronic obstructive pulmonary disease) ([facility] [address]     Hypertension     Nocturia     Pneumonia     Radiation effect           Objective:    Physical Assessment:    General: Alert. Grunting, and in tripod position. Oriented to Person, Place, Time and Situation    HEENT: PERRLA. Hard of hearing. Respiratory: crackles in lower left lung fields. No wheezing. Cardiac: Regular rhythm. GI: Bowel sounds active. : Void status is voiding well without difficulty  Skin: ecchymoses - hip(s) left. Extremities: +2-+3 pitting edema in BLE. Neurovascular status intact otherwise.      Vitals:  Patient Vitals for the past 12 hrs:   Temp Pulse Resp BP SpO2   04/13/21 0813 98 °F (36.7 °C) 81 21 (!) 140/53 100 %   04/13/21 0331 97.6 °F (36.4 °C) 71 24 (!) 143/60 99 %   04/13/21 0000 -- 66 23 (!) 110/55 96 %        Pertinent labs:  Recent Results (from the past 12 hour(s))   RENAL FUNCTION PANEL    Collection Time: 04/13/21  6:15 AM   Result Value Ref Range    Sodium 139 136 - 145 mmol/L    Potassium 3.5 3.5 - 5.5 mmol/L    Chloride 100 100 - 111 mmol/L    CO2 31 21 - 32 mmol/L    Anion gap 8 3.0 - 18 mmol/L    Glucose 160 (H) 74 - 99 mg/dL    BUN 51 (H) 7.0 - 18 MG/DL    Creatinine 1.51 (H) 0.6 - 1.3 MG/DL    BUN/Creatinine ratio 34 (H) 12 - 20      GFR est AA 55 (L) >60 ml/min/1.73m2    GFR est non-AA 45 (L) >60 ml/min/1.73m2    Calcium 7.1 (L) 8.5 - 10.1 MG/DL    Phosphorus 3.5 2.5 - 4.9 MG/DL    Albumin 2.3 (L) 3.4 - 5.0 g/dL   CBC WITH AUTOMATED DIFF    Collection Time: 04/13/21  6:15 AM   Result Value Ref Range    WBC 16.7 (H) 4.6 - 13.2 K/uL    RBC 2.67 (L) 4.35 - 5.65 M/uL    HGB 7.8 (L) 13.0 - 16.0 g/dL    HCT 23.2 (L) 36.0 - 48.0 %    MCV 86.9 74.0 - 97.0 FL    MCH 29.2 24.0 - 34.0 PG    MCHC 33.6 31.0 - 37.0 g/dL    RDW 13.7 11.6 - 14.5 %    PLATELET 111 910 - 998 K/uL    MPV 9.9 9.2 - 11.8 FL    NEUTROPHILS 88 (H) 40 - 73 %    LYMPHOCYTES 3 (L) 21 - 52 %    MONOCYTES 5 3 - 10 %    EOSINOPHILS 0 0 - 5 %    BASOPHILS 0 0 - 2 %    ABS. NEUTROPHILS 14.8 (H) 1.8 - 8.0 K/UL    ABS. LYMPHOCYTES 0.4 (L) 0.9 - 3.6 K/UL    ABS. MONOCYTES 0.8 0.05 - 1.2 K/UL    ABS. EOSINOPHILS 0.0 0.0 - 0.4 K/UL    ABS.  BASOPHILS 0.0 0.0 - 0.1 K/UL    DF AUTOMATED     GLUCOSE, POC    Collection Time: 04/13/21  6:23 AM   Result Value Ref Range    Glucose (POC) 189 (H) 70 - 110 mg/dL           Current Facility-Administered Medications:     piperacillin-tazobactam (ZOSYN) 3.375 g in 0.9% sodium chloride (MBP/ADV) 100 mL MBP, 3.375 g, IntraVENous, Q6H, Andrei Pagan MD, Last Rate: 200 mL/hr at 04/13/21 0902, 3.375 g at 04/13/21 0902    famotidine (PEPCID) tablet 20 mg, 20 mg, Oral, DAILY, Melba Felton MD, 20 mg at 04/13/21 1889    furosemide (LASIX) injection 20 mg, 20 mg, IntraVENous, Q24H, Andrei Pagan MD, 20 mg at 04/12/21 1031    Vancomycin - Pharmacokinetic Dosing, 1 Each, Other, Rx Dosing/Monitoring, Kelsea Castle MD    vancomycin (VANCOCIN) 1500 mg in  ml infusion, 1,500 mg, IntraVENous, Q24H, Kelsea Castle MD, Last Rate: 250 mL/hr at 04/12/21 1035, 1,500 mg at 04/12/21 1035    methylPREDNISolone (PF) (SOLU-MEDROL) injection 20 mg, 20 mg, IntraVENous, Q6H, Kashif Pagan MD, 20 mg at 04/13/21 0906    insulin lispro (HUMALOG) injection, , SubCUTAneous, AC&HS, Kelsea Castle MD, 2 Units at 04/13/21 0646    glucose chewable tablet 16 g, 4 Tab, Oral, PRN, Kelsea Castle MD    glucagon (GLUCAGEN) injection 1 mg, 1 mg, IntraMUSCular, PRN, Kelsea Castle MD    dextrose 10% infusion 125-250 mL, 125-250 mL, IntraVENous, PRN, Kelsea Castle MD    0.9% sodium chloride infusion 250 mL, 250 mL, IntraVENous, PRN, Ej, Tony Marino MD    sodium chloride (NS) flush 5-40 mL, 5-40 mL, IntraVENous, Q8H, Zulay Gillis PA, 10 mL at 04/13/21 0646    sodium chloride (NS) flush 5-40 mL, 5-40 mL, IntraVENous, PRN, Zulay Gillis PA    ferrous sulfate tablet 325 mg, 1 Tab, Oral, BID WITH MEALS, TRINA Foster, 325 mg at 04/13/21 5655    acetaminophen (TYLENOL) tablet 650 mg, 650 mg, Oral, Q4H PRN, Zulay Gillis PA    naloxone [facility]) injection 0.4 mg, 0.4 mg, IntraVENous, PRN, Zulay Gillis PA    promethazine (PHENERGAN) tablet 25 mg, 25 mg, Oral, Q6H PRN, Zulay Gillis PA    senna (SENOKOT) tablet 8.6 mg, 1 Tab, Oral, BID, TRINA Foster, 8.6 mg at 04/13/21 9115    sodium chloride (OCEAN) 0.65 % nasal squeeze bottle 2 Spray, 2 Spray, Both Nostrils, Q2H PRN, Iva Cline MD    ipratropium (ATROVENT) 0.02 % nebulizer solution 0.5 mg, 0.5 mg, Nebulization, QID RT, Zach Montano MD, 0.5 mg at 04/13/21 0734    amLODIPine (NORVASC) tablet 5 mg, 5 mg, Oral, DAILY, Iva Cline MD, 5 mg at 04/13/21 0904    tamsulosin (FLOMAX) capsule 0.4 mg, 0.4 mg, Oral, QPM, Iva Cline MD, 0.4 mg at 04/12/21 1800    dilTIAZem IR (CARDIZEM) tablet 30 mg, 30 mg, Oral, TIDAC, Iva Cline MD, 30 mg at 04/13/21 0646    furosemide (LASIX) tablet 20 mg, 20 mg, Oral, DAILY, Michelle Lozano MD, 20 mg at 04/13/21 5742    [DISCONTINUED] acetaminophen (TYLENOL) tablet 650 mg, 650 mg, Oral, Q6H PRN, Stopped at 04/10/21 1859 **OR** acetaminophen (TYLENOL) suppository 650 mg, 650 mg, Rectal, Q6H PRN, Iva Cline MD    bisacodyL (DULCOLAX) suppository 10 mg, 10 mg, Rectal, DAILY PRN, Iva Cline MD    promethazine (PHENERGAN) tablet 12.5 mg, 12.5 mg, Oral, Q6H PRN **OR** ondansetron (ZOFRAN) injection 4 mg, 4 mg, IntraVENous, Q6H PRN, Iva Cline MD    [Held by provider] enoxaparin (LOVENOX) injection 40 mg, 40 mg, SubCUTAneous, DAILY, Iva Cline MD, 40 mg at 04/11/21 0803    budesonide (PULMICORT) 500 mcg/2 ml nebulizer suspension, 500 mcg, Nebulization, BID RT, Iva Cline MD, 500 mcg at 04/13/21 0734    arformoteroL (BROVANA) neb solution 15 mcg, 15 mcg, Nebulization, BID RT, Mckenna Stuart MD, 15 mcg at 04/13/21 0734    albuterol-ipratropium (DUO-NEB) 2.5 MG-0.5 MG/3 ML, 3 mL, Nebulization, Q4H PRN, Mckenna Stuart MD, 3 mL at 04/13/21 0133          Gilma Tierney, MSN, RN, [facility]    Clinical Nurse Specialist  [address]  [address] [facility]  Office: [phone]: [phone]

## 2021-04-13 NOTE — ROUTINE PROCESS
Bedside and Verbal shift change report given to Paola Crawford RN (oncoming nurse) by Deshawn White RN (offgoing nurse). Report included the following information SBAR, Kardex, Procedure Summary, Intake/Output, MAR, and Cardiac Rhythm NSR.

## 2021-04-13 NOTE — PROGRESS NOTES
Transfer Acknowledgement: CM notes that the patient has transferred from the ICU to [facility], [place]. CM to follow the patient's progress and be available to assist as needed.

## 2021-04-13 NOTE — PROGRESS NOTES
Hospitalist Progress Note-critical care note     Patient: Brenda Roger MRN: [id number]  CSN: [id number]    YOB: 1945  Age: 66 y.o.   Sex: male    DOA: 4/5/2021 LOS:  LOS: 8 days            Chief complaint: anemia , avn, paf, debility     Assessment/Plan         Hospital Problems  Date Reviewed: 2/27/2021          Codes Class Noted POA    Anemia ICD-10-CM: D64.9  ICD-9-CM: 285.9  4/11/2021 Unknown        Acute respiratory failure with hypoxia and hypercapnia (HCC) ICD-10-CM: J96.01, J96.02  ICD-9-CM: 518.81  4/10/2021 Unknown        Obesity ICD-10-CM: E66.9  ICD-9-CM: 278.00  4/10/2021 Unknown        Acute pulmonary edema ([facility] ICD-10-CM: J81.0  ICD-9-CM: 518.4  4/10/2021 Unknown        Avascular necrosis of bone of left hip ([facility] ICD-10-CM: M87.052  ICD-9-CM: 733.42  4/8/2021 Unknown        Left hip pain ICD-10-CM: M25.552  ICD-9-CM: 719.45  4/5/2021 Unknown        PAF (paroxysmal atrial fibrillation) (HCC) ICD-10-CM: I48.0  ICD-9-CM: 427.31  4/5/2021 Unknown        Debility ICD-10-CM: R53.81  ICD-9-CM: 799.3  7/8/2019 Yes        * (Principal) Acute exacerbation of chronic obstructive pulmonary disease (COPD) ([facility] ICD-10-CM: J44.1  ICD-9-CM: 491.21  2/8/2019 Unknown        Hypertension ICD-10-CM: I10  ICD-9-CM: 401.9  Unknown Yes                Patient was admitted due to hyponatremia and hypokalemia and COPD exacerbation.   also needs left hip surgery he post surgery 4/9/21     Acute COPD exacerbation  Resolving, weaning off Iv steroid, breathing tx, empiric abx        Asbestosis and chronic respiration failure with hypoxia   On home O2 2 L       paf   Continue cardizem   Post op needs theraputic anticoagulation -hold due to hematoma   Per cardio recommendations      Hypertension: On Cardizem        Fluid overloaded-resolving   dvt negative  Lasix per renal   Echo ef 74-81 % mild systolic dysfunction-done in Feb, 2021     Hearing loss   Having hearing aid   communication board      Hip pain -AVN   Post surgery,  Continue pt.ot   Pain control     Anemia   Due to acute bleeding   Received transfusion   Continue monitoring, transfuse as needed     Subjective: feel fine, pain is better          Disposition :tbd,   Review of systems:    General: No fevers or chills. Cardiovascular: No chest pain or pressure. No palpitations. Pulmonary: No shortness of breath. Gastrointestinal: No nausea, vomiting. Msk: hip pain     Vital signs/Intake and Output:  Visit Vitals  BP (!) 133/99   Pulse 93   Temp 97.9 °F (36.6 °C)   Resp 16   Ht 5' 7\" (1.702 m)   Wt 98.6 kg (217 lb 4.8 oz)   SpO2 100%   BMI 34.03 kg/m²     Current Shift:  04/13 0701 - 04/13 1900  In: -   Out: 300 [Urine:300]  Last three shifts:  04/11 1901 - 04/13 0700  In: 200 [I.V.:200]  Out: 8429 [Urine:2725]    Physical Exam:  General: WD, WN. Alert, cooperative, no acute distress    HEENT: NC, Atraumatic. PERRLA, anicteric sclerae. Lungs: CTA Bilaterally. No Wheezing/Rhonchi/Rales. Heart:  Regular  rhythm,  No murmur, No Rubs, No Gallops  Abdomen: Soft, Non distended, Non tender. +Bowel sounds,   Extremities: No c/c left hip swelling and hematoma noted -improving   Psych:   Not anxious or agitated. Neurologic:  No acute neurological deficit.              Labs: Results:       Chemistry Recent Labs     04/13/21  0615 04/12/21  0409 04/11/21  0405   * 149* 147*    136 136   K 3.5 3.6 3.9    98* 98*   CO2 31 31 30   BUN 51* 51* 47*   CREA 1.51* 1.67* 1.65*   CA 7.1* 7.1* 7.3*   AGAP 8 7 8   BUCR 34* 31* 28*   ALB 2.3* 2.1* 2.2*      CBC w/Diff Recent Labs     04/13/21  1210 04/13/21  0615 04/12/21  2255 04/12/21  0409 04/12/21  0409 04/11/21  0405 04/11/21  0405   WBC  --  16.7*  --   --  15.7*  --  19.6*   RBC  --  2.67*  --   --  2.59*  --  2.78*   HGB 8.7* 7.8* 7.5*   < > 7.6*   < > 8.1*   HCT 26.1* 23.2* 21.9*   < > 22.8*   < > 24.2*   PLT  --  244  --   --  241  --  242   GRANS  --  88*  --   --  87*  --  89*   LYMPH  --  3* --   --  4*  --  3*   EOS  --  0  --   --  0  --  0    < > = values in this interval not displayed. Cardiac Enzymes Recent Labs     04/10/21  2005      CKND1 2.5      Coagulation No results for input(s): PTP, INR, APTT, INREXT, INREXT in the last 72 hours. Lipid Panel No results found for: CHOL, CHOLPOCT, CHOLX, CHLST, CHOLV, 135269, HDL, HDLP, LDL, LDLC, DLDLP, 542631, VLDLC, VLDL, TGLX, TRIGL, TRIGP, TGLPOCT, CHHD, CHHDX   BNP No results for input(s): BNPP in the last 72 hours. Liver Enzymes Recent Labs     04/13/21  0615   ALB 2.3*      Thyroid Studies Lab Results   Component Value Date/Time    TSH 3.06 04/05/2021 01:30 PM        Procedures/imaging: see electronic medical records for all procedures/Xrays and details which were not copied into this note but were reviewed prior to creation of Plan    Xr Hip Lt W Or Wo Pelv 2-3 Vws    Result Date: 4/11/2021  EXAM: 2 views left hip CLINICAL INDICATION/HISTORY: Left hip pain COMPARISON: 4/5/2021 _______________ FINDINGS: Status post left arthroplasty. Hardware is intact and aligned. No fracture or dislocation. Soft tissue swelling and gas around the left hip from the recent surgery. _______________     1. Status post left hip arthroplasty with no fracture or dislocation identified. Soft tissue swelling and gas identified around the left hip from recent surgery. Xr Hip Lt W Or Wo Pelv 2-3 Vws    Result Date: 4/5/2021  EXAM: LEFT HIP RADIOGRAPHS CLINICAL INDICATION/HISTORY: left hip pain, difficulty ambulating -Additional: None COMPARISON: Supine with 2/26/2021 TECHNIQUE: AP pelvis and frog-leg lateral view of left hip. _______________ FINDINGS: BONES: Severe left hip osteoarthrosis with femoral head remodeling and collapse. No evidence of acute displaced fracture or dislocation. SOFT TISSUES: Unremarkable. _______________     Severe left hip osteoarthrosis with femoral head collapse/remodeling.   Sclerosis and lucency in femoral head may reflect collapse/remodeling versus AVN. Consider MRI in the appropriate clinical setting. No evidence of acute fracture or dislocation. Xr Knee Lt Max 2 Vws    Result Date: 4/5/2021  HISTORY: -Provided on order: pain, difficulty ambulating -Additional: None Technique: 2 views of left knee are presented for interpretation. Comparison study: none. Findings: The bones are osteopenic. There is no plain film evident fracture or dislocation. No radiopaque foreign body or significant arthropathy. No significant arthropathy. No plain film evident knee joint effusion is seen. 1. No acute bony abnormality is identified by plain films. Intrinsic knee ligamentous, meniscal and cartilaginous integrity can be best evaluated by routine knee MRI if clinically warranted. Cta Chest W Or W Wo Cont    Result Date: 4/11/2021  EXAM: CTA chest CLINICAL INDICATION/HISTORY: Shortness of breath COMPARISON: Chest radiograph dated 4/10/2021 chest CTA dated 10/31/2019 TECHNIQUE: Axial CT imaging from the thoracic inlet through the diaphragm with intravenous contrast. Coronal and sagittal MIP reformats were generated. One or more dose reduction techniques were used on this CT: automated exposure control, adjustment of the mAs and/or kVp according to patient size, and iterative reconstruction techniques. The specific techniques used on this CT exam have been documented in the patient's electronic medical record. Digital Imaging and Communications in Medicine (DICOM) format image data are available to nonaffiliated external healthcare facilities or entities on a secured, media free, reciprocally searchable basis with patient authorization for at least a 12-month period after this study. _______________ FINDINGS: EXAM QUALITY: Adequate PULMONARY ARTERIES: No pulmonary embolism identified. MEDIASTINUM: Normal heart size. No aortic dissection. Atherosclerotic calcifications in the aorta and coronary arteries. No pericardial effusion.  LUNGS: Scattered atelectasis and scarring. No focal consolidation. No suspicious nodules or masses. AIRWAY: Normal. PLEURA: Chronic right-sided pleural calcifications with chronic pleural collections. No pneumothorax. LYMPH NODES: No enlarged nodes. UPPER ABDOMEN: Calcified granuloma in the liver. Right upper pole renal cyst is unchanged from 10/31/2019. No follow-up imaging necessary. BONES: No acute or aggressive osseous abnormalities identified. OTHER: None. _______________     1. No pulmonary embolism identified. 2. Chronic right-sided pleural calcifications and chronic pleural collections which are unchanged compared to prior exams. No new focal consolidation. Scattered atelectasis and/or scarring in the lungs. Ct Pelv W Cont    Result Date: 4/11/2021  EXAM: CT of the pelvis CLINICAL INDICATION/HISTORY: Recent left hip arthroplasty with left hip pain COMPARISON: CT left hip dated 4/6/2021, left hip radiographs dated 4/11/2021 TECHNIQUE: Axial CT imaging of the pelvis was performed with intravenous contrast. Multiplanar reformats were generated. One or more dose reduction techniques were used on this CT: automated exposure control, adjustment of the mAs and/or kVp according to patient size, and iterative reconstruction techniques. The specific techniques used on this CT exam have been documented in the patient's electronic medical record. Digital Imaging and Communications in Medicine (DICOM) format image data are available to nonaffiliated external healthcare facilities or entities on a secured, media free, reciprocally searchable basis with patient authorization for at least a 12-month period after this study. _______________ FINDINGS: PELVIC ORGANS: Mild bladder wall thickening. Limited evaluation of the prostate secondary to streak artifact from left hip arthroplasty. LYMPH NODES: No enlarged lymph nodes. GASTROINTESTINAL TRACT: Moderate stool burden in the rectum.  No bowel obstruction identified in the lower abdomen or pelvis. VASCULATURE: Aortoiliac atherosclerosis. BONES: Recent left hip arthroplasty. Hardware appears intact. No dislocation. No acute fracture identified. Soft tissue swelling, hematoma, and gas identified around the left hip from the recent surgery. More focal organized hematoma near the left hip on image 57 measures 3.4 cm. Hematoma also seen tracking along the thigh musculature. OTHER: Small fat-containing left inguinal hernia. _______________     1. Recent left hip arthroplasty with no fracture or dislocation identified. Soft tissue swelling, hematoma, and gas identified around the left hip from the recent surgery. Ct Hip Lt Wo Cont    Result Date: 4/6/2021  --------------------------------------------------------------------------- <<<<<<<<<           [facility]           >>>>>>>>> --------------------------------------------------------------------------- HISTORY: -From Provider:r/o avn, not candidate for mri -Additional: None COMPARISON EXAMINATIONS:   None. TECHNIQUE:   CT of the left hip without intravenous contrast administration. Coronal and sagittal reformats were generated and reviewed. One or more dose reduction techniques were used on this CT: automated exposure control, adjustment of the mAs and/or kVp according to patient size, and iterative reconstruction techniques. The specific techniques used on this CT exam have been documented in the patient's electronic medical record. --------------------------     FINDINGS    -------------------------- OSSEOUS STRUCTURES:  Left hip: There is irregular subchondral lucency and cortical irregularity with slight flattening/collapse of the left femoral head. Severe degenerative changes in the right hip with severe joint space narrowing, subchondral cysts and slight marginal spurring. Soft tissue thickening about the joint space suggesting hip joint effusion.  Visualized bony pelvis and proximal femur: Degenerative changes in the SI joints and spine. Relatively severe central and foraminal stenosis at L4/5 and to lesser degree at L3/4. Visualized pelvic soft tissues: LYMPH NODES:  Subcentimeter short axis left groin lymph nodes are present. [ ] GI TRACT:  Colonic diverticulosis, without CT evidence diverticulitis. Feculent distention of the rectum. PELVIC ORGANS:  The visualized portion of the bladder is unremarkable. VASCULATURE:  Atherosclerotic calcification in visualized left iliac and femoral arteries. OTHER:   No ascites or free intraperitoneal air in visualized extent. Fat-containing left inguinal hernia. Fatty change in the gluteal musculature bilaterally. Subcutaneous stranding/edema along left more than right lateral flank. ---------------------------------------------------------------------------    --------------------------------------------------------------------------- 1. Subchondral serpiginous lucencies in the weightbearing left femoral head suspicious for avascular necrosis, with associated cortical irregularity in keeping with flattening/collapse. Suspected left hip joint effusion. 2. Relatively severe mild degenerative changes in the left hip. Mild degenerative changes in the right hip. Degenerative changes in the SI joints and spine. In the setting of radiculopathy, routine lumbar spine MRI could best further assess. Xr Chest Port    Result Date: 4/10/2021  EXAM: PORTABLE CHEST HISTORY: Chest pain and shortness of breath. COMPARISON: 4/5/2021 TECHNIQUE: Single portable view. _______________ FINDINGS: SUPPORT DEVICES: None HEART AND MEDIASTINUM: Cardiac size is normal. Mediastinal contours are normal. Normal pulmonary vasculature. LUNGS AND PLEURAL SPACES: Right base pleuroparenchymal scarring without change. Multiple areas of right pleural calcification without change. BONES AND SOFT TISSUES: Bony structures are normal. Chronic elevation right hemidiaphragm.  _______________     No evidence of active pulmonary process. Chronic right basilar pleuroparenchymal scarring and multiple right sided pleural plaques without change. Xr Chest Port    Result Date: 4/5/2021  HISTORY: -Provided with order: SOB -Additional: Bilateral lower extremity swelling. Technique : AP PORTABLE CHEST Comparison : 02/28/21 FINDINGS: Patient is rotated to the right. The chin obscures the upper thorax. There is mild motion degradation and external artifact which additionally limits visibility. HEART AND MEDIASTINUM: Unremarkable. LUNGS AND PLEURAL SPACES: Calcified pleural plaques present on the right. Vague opacity in the right infrahilar region is less evident than the prior, but degraded by motion. There is stable elevation of the right diaphragm. BONY THORAX AND SOFT TISSUES: No acute osseous abnormality. Improved aeration with mild, less evident ill-defined opacity in the right infrahilar region, which can reflect chronic atelectasis or infiltrate. No additional change. Xr Hip Chago Feathers Or Wo Pelv  1 Vw    Result Date: 4/9/2021  EXAM:LEFT HIP HISTORY: Left hip pain. COMPARISON: 4/5/2021 TECHNIQUE: Single AP view _______________ FINDINGS: BONES: Left hip total arthroplasty with prosthetic components properly positioned and aligned on the single limited frontal view. No additional bony abnormality. SOFT TISSUES: Expected reactive and emphysematous changes in soft tissues adjacent to left hip. _______________     Normal postoperative appearance left hip arthroplasty with expected postoperative changes in adjacent soft tissues. Echo Stress    Addendum Date: 4/8/2021    · Baseline ECG: Normal EKG. · Stress test: Negative stress test. Exercise stress test: EKG stress test results correlate with a low risk of inducible myocardial ischemia.  · Echo: Normal stress echocardiogram. · Technically difficult study due to severe COPD and suboptimal echocardiographic windows in spite of using definity contrast reduces accurarcy of test. Overall Normal Dobutamine stress echocardiogram without evidence of ischemia. Resting EF 55% Peak dubatminestress EF 75%. Discussed with patient. Result Date: 4/8/2021  · Baseline ECG: Normal EKG. · Stress test: Negative stress test. Exercise stress test: EKG stress test results correlate with a low risk of inducible myocardial ischemia. · Echo: Normal stress echocardiogram. · Technically difficult study due to severe COPD and suboptimal echocardiographic windows in spite of using definity contrast reduces accurarcy of test.  Overall Normal Dobutamine stress echocardiogram without evidence of ischemia. Resting EF 55% Peak dubatminestress EF 75%. Discussed with patient. Duplex Lower Ext Venous Bilat    Result Date: 4/12/2021  · No evidence of deep vein thrombosis in the right lower extremity. · No evidence of deep vein thrombosis in the left lower extremity. Duplex Lower Ext Venous Bilat    Result Date: 4/5/2021  · No evidence of deep vein thrombosis in the right lower extremity veins assessed. · No evidence of deep vein thrombosis in the left lower extremity veins assessed.         Sam Russell MD

## 2021-04-13 NOTE — ROUTINE PROCESS
0207 Bedside and Verbal shift change report given to Dom Reid RN (oncoming nurse) by Osmin Costello. Valentina Downey RN (offgoing nurse). Report included the following information SBAR, Kardex, Intake/Output, MAR and Recent Results. 1661 Bedside and Verbal shift change report given to Zoey TUCKER (oncoming nurse) by Dom Reid RN (offgoing nurse). Report included the following information SBAR, Kardex, Intake/Output, MAR and Recent Results.

## 2021-04-13 NOTE — PROGRESS NOTES
0730 : Bedside shift change report given to Jeremy Flores RN (oncoming nurse) by Aleyda Grace RN (offgoing nurse). Report included the following information SBAR, Kardex, Intake/Output and MAR.     1400 : patient had a BM, Physical therapy assist to bedside commode. 1930 : Bedside shift change report given to RAMSEY Valentino (oncoming nurse) by Jeremy Flores RN (offgoing nurse). Report included the following information SBAR, Kardex, Intake/Output and MAR.

## 2021-04-13 NOTE — PROGRESS NOTES
Problem: Chronic Obstructive Pulmonary Disease (COPD)  Goal: *Oxygen saturation during activity within specified parameters  Outcome: Progressing Towards Goal  Goal: *Able to remain out of bed as prescribed  Outcome: Progressing Towards Goal  Goal: *Absence of hypoxia  Outcome: Progressing Towards Goal  Goal: *Optimize nutritional status  Outcome: Progressing Towards Goal     Problem: Falls - Risk of  Goal: *Absence of Falls  Description: Document Darell Fall Risk and appropriate interventions in the flowsheet. Outcome: Progressing Towards Goal  Note: Fall Risk Interventions:  Mobility Interventions: Bed/chair exit alarm    Mentation Interventions: Adequate sleep, hydration, pain control    Medication Interventions: Teach patient to arise slowly, Patient to call before getting OOB    Elimination Interventions: Call light in reach    History of Falls Interventions:  Investigate reason for fall

## 2021-04-13 NOTE — PROGRESS NOTES
Problem: Mobility Impaired (Adult and Pediatric)  Goal: *Acute Goals and Plan of Care (Insert Text)  Description: Physical Therapy Goals   Initiated 4/10/2021 and to be accomplished within 3-5 day(s)  1. Patient will move from supine <> sit with S in prep for out of bed activity and change of position. 2.  Patient will perform sit<> stand with S with LRAD in prep for transfers/ambulation. 3.  Patient will transfer from bed <> chair with S with LRAD for time up in chair for completion of ADL activity. 4.  Patient will ambulate >50 feet with CGA/LRAD for improved functional mobility at discharge. 5.  Patient will ascend/descend 3-5 stairs with handrails with minimal assistance/contact guard assist for home re-entry as needed. Outcome: Progressing Towards Goal   []  Patient has met MD mobilization kelli for d/c home   []  Recommend HH with 24 hour adult care   [x]  Benefit from additional acute PT session to address:  increase activity tolerance, SNF placement    PHYSICAL THERAPY TREATMENT    Patient: Danya Finch (38 y.o. male)  Date: 4/13/2021  Diagnosis: Acute exacerbation of chronic obstructive pulmonary disease (COPD) ([facility] [J44.1] Acute exacerbation of chronic obstructive pulmonary disease (COPD) ([facility]  Procedure(s) (LRB):  HIP ARTHROPLASTY TOTAL ANTERIOR APPROACH - WITH C-ARM, (Left) 4 Days Post-Op  Precautions: Fall, WBAT  PLOF:     ASSESSMENT:  Pt on BSC upon arrival.  Retrieved towels to dry floor (urinated on floor) and wipes for rachid-care. Donned grippy socks. Crystal for sit to stand, CNA assisted with rachid-care. Pt transferred over to bed, sat EOB to rest.  SOB throughout session, SpO2 98% with supplemental O2. Pt ambulated 20ft with RW in room. Pt very fatigued after 10ft. Returned to sitting EOB.   Progression toward goals:   []      Improving appropriately and progressing toward goals  [x]      Improving slowly and progressing toward goals  []      Not making progress toward goals and plan of care will be adjusted     PLAN:  Patient continues to benefit from skilled intervention to address the above impairments. Continue treatment per established plan of care. Discharge Recommendations:  Shree Wiley  Further Equipment Recommendations for Discharge:  rolling walker     SUBJECTIVE:   Patient stated I need help getting cleaned up.     OBJECTIVE DATA SUMMARY:   Critical Behavior:  Neurologic State: Alert  Orientation Level: Oriented X4  Cognition: Follows commands  Safety/Judgement: Awareness of environment, Insight into deficits  Functional Mobility Training:  Transfers:  Sit to Stand: Minimum assistance  Stand to Sit: Contact guard assistance  Balance:  Sitting: Intact  Standing: Intact; With support   Ambulation/Gait Training:  Distance (ft): 20 Feet (ft)  Assistive Device: Gait belt;Walker, rolling  Ambulation - Level of Assistance: Minimal assistance  Gait Abnormalities: Antalgic;Decreased step clearance  Left Side Weight Bearing: As tolerated  Base of Support: Center of gravity altered  Speed/[place]isbet: Slow  Step Length: Right shortened;Left shortened  Pain:  Pain level pre-treatment: 5/10  Pain level post-treatment: 5/10   Pain Intervention(s): Medication (see MAR); Rest, Ice, Repositioning   Response to intervention: Nurse notified, See doc flow    Activity Tolerance:   Fair-  Please refer to the flowsheet for vital signs taken during this treatment. After treatment:   [] Patient left in no apparent distress sitting up in chair  [x] Patient left in no apparent distress in bed  [x] Call bell left within reach  [] Nursing notified  [x] Caregiver present  [] Bed alarm activated  [] SCDs applied      COMMUNICATION/EDUCATION:   [x]         Role of Physical Therapy in the acute care setting. [x]         Fall prevention education was provided and the patient/caregiver indicated understanding.   [x]         Patient/family have participated as able in working toward goals and plan of care. [x]         Patient/family agree to work toward stated goals and plan of care. []         Patient understands intent and goals of therapy, but is neutral about his/her participation.   []         Patient is unable to participate in stated goals/plan of care: ongoing with therapy staff.  []         Other:        Kavya Solorzano, MARIBEL   Time Calculation: 23 mins

## 2021-04-14 VITALS
BODY MASS INDEX: 34.11 KG/M2 | TEMPERATURE: 98 F | DIASTOLIC BLOOD PRESSURE: 51 MMHG | RESPIRATION RATE: 18 BRPM | HEIGHT: 67 IN | WEIGHT: 217.3 LBS | SYSTOLIC BLOOD PRESSURE: 132 MMHG | HEART RATE: 79 BPM | OXYGEN SATURATION: 100 %

## 2021-04-14 PROBLEM — Z96.642 STATUS POST TOTAL REPLACEMENT OF LEFT HIP: Status: ACTIVE | Noted: 2021-04-14

## 2021-04-14 LAB
ALBUMIN SERPL-MCNC: 2.4 G/DL (ref 3.4–5)
ANION GAP SERPL CALC-SCNC: 8 MMOL/L (ref 3–18)
BASOPHILS # BLD: 0 K/UL (ref 0–0.1)
BASOPHILS NFR BLD: 0 % (ref 0–2)
BUN SERPL-MCNC: 55 MG/DL (ref 7–18)
BUN/CREAT SERPL: 40 (ref 12–20)
CALCIUM SERPL-MCNC: 6.9 MG/DL (ref 8.5–10.1)
CHLORIDE SERPL-SCNC: 100 MMOL/L (ref 100–111)
CO2 SERPL-SCNC: 30 MMOL/L (ref 21–32)
CREAT SERPL-MCNC: 1.36 MG/DL (ref 0.6–1.3)
DIFFERENTIAL METHOD BLD: ABNORMAL
EOSINOPHIL # BLD: 0 K/UL (ref 0–0.4)
EOSINOPHIL NFR BLD: 0 % (ref 0–5)
ERYTHROCYTE [DISTWIDTH] IN BLOOD BY AUTOMATED COUNT: 14 % (ref 11.6–14.5)
GLUCOSE BLD STRIP.AUTO-MCNC: 124 MG/DL (ref 70–110)
GLUCOSE BLD STRIP.AUTO-MCNC: 204 MG/DL (ref 70–110)
GLUCOSE BLD STRIP.AUTO-MCNC: 271 MG/DL (ref 70–110)
GLUCOSE SERPL-MCNC: 133 MG/DL (ref 74–99)
HCT VFR BLD AUTO: 24.1 % (ref 36–48)
HGB BLD-MCNC: 8.1 G/DL (ref 13–16)
LYMPHOCYTES # BLD: 0.6 K/UL (ref 0.9–3.6)
LYMPHOCYTES NFR BLD: 3 % (ref 21–52)
MCH RBC QN AUTO: 30 PG (ref 24–34)
MCHC RBC AUTO-ENTMCNC: 33.6 G/DL (ref 31–37)
MCV RBC AUTO: 89.3 FL (ref 74–97)
MONOCYTES # BLD: 1.4 K/UL (ref 0.05–1.2)
MONOCYTES NFR BLD: 8 % (ref 3–10)
NEUTS SEG # BLD: 14.8 K/UL (ref 1.8–8)
NEUTS SEG NFR BLD: 84 % (ref 40–73)
PHOSPHATE SERPL-MCNC: 2.7 MG/DL (ref 2.5–4.9)
PLATELET # BLD AUTO: 312 K/UL (ref 135–420)
PMV BLD AUTO: 9.5 FL (ref 9.2–11.8)
POTASSIUM SERPL-SCNC: 3.5 MMOL/L (ref 3.5–5.5)
RBC # BLD AUTO: 2.7 M/UL (ref 4.35–5.65)
SODIUM SERPL-SCNC: 138 MMOL/L (ref 136–145)
WBC # BLD AUTO: 17.7 K/UL (ref 4.6–13.2)

## 2021-04-14 PROCEDURE — 85025 COMPLETE CBC W/AUTO DIFF WBC: CPT

## 2021-04-14 PROCEDURE — 74011000250 HC RX REV CODE- 250: Performed by: INTERNAL MEDICINE

## 2021-04-14 PROCEDURE — 74011250637 HC RX REV CODE- 250/637: Performed by: HOSPITALIST

## 2021-04-14 PROCEDURE — 74011000250 HC RX REV CODE- 250: Performed by: HOSPITALIST

## 2021-04-14 PROCEDURE — 36415 COLL VENOUS BLD VENIPUNCTURE: CPT

## 2021-04-14 PROCEDURE — 74011250636 HC RX REV CODE- 250/636: Performed by: INTERNAL MEDICINE

## 2021-04-14 PROCEDURE — 74011250636 HC RX REV CODE- 250/636: Performed by: HOSPITALIST

## 2021-04-14 PROCEDURE — 80069 RENAL FUNCTION PANEL: CPT

## 2021-04-14 PROCEDURE — 94640 AIRWAY INHALATION TREATMENT: CPT

## 2021-04-14 PROCEDURE — 74011000258 HC RX REV CODE- 258: Performed by: INTERNAL MEDICINE

## 2021-04-14 PROCEDURE — 74011250637 HC RX REV CODE- 250/637: Performed by: INTERNAL MEDICINE

## 2021-04-14 PROCEDURE — 94760 N-INVAS EAR/PLS OXIMETRY 1: CPT

## 2021-04-14 PROCEDURE — 77010033678 HC OXYGEN DAILY

## 2021-04-14 PROCEDURE — 74011250637 HC RX REV CODE- 250/637: Performed by: PHYSICIAN ASSISTANT

## 2021-04-14 PROCEDURE — 74011636637 HC RX REV CODE- 636/637: Performed by: INTERNAL MEDICINE

## 2021-04-14 PROCEDURE — 82962 GLUCOSE BLOOD TEST: CPT

## 2021-04-14 RX ORDER — ENOXAPARIN SODIUM 100 MG/ML
40 INJECTION SUBCUTANEOUS DAILY
Qty: 21 SYRINGE | Refills: 0 | Status: SHIPPED
Start: 2021-04-14 | End: 2021-10-07

## 2021-04-14 RX ORDER — DOXYCYCLINE 100 MG/1
100 CAPSULE ORAL 2 TIMES DAILY
Qty: 20 CAP | Refills: 0 | Status: SHIPPED
Start: 2021-04-14 | End: 2021-04-23

## 2021-04-14 RX ORDER — LANOLIN ALCOHOL/MO/W.PET/CERES
325 CREAM (GRAM) TOPICAL 2 TIMES DAILY WITH MEALS
Qty: 30 TAB | Refills: 0 | Status: ON HOLD
Start: 2021-04-14 | End: 2022-04-15 | Stop reason: SDUPTHER

## 2021-04-14 RX ORDER — PREDNISONE 5 MG/1
TABLET ORAL
Qty: 21 TAB | Refills: 0 | Status: SHIPPED | OUTPATIENT
Start: 2021-04-14 | End: 2021-04-23

## 2021-04-14 RX ORDER — FAMOTIDINE 20 MG/1
20 TABLET, FILM COATED ORAL DAILY
Qty: 30 TAB | Refills: 0 | Status: SHIPPED
Start: 2021-04-14 | End: 2022-04-06

## 2021-04-14 RX ORDER — ARFORMOTEROL TARTRATE 15 UG/2ML
15 SOLUTION RESPIRATORY (INHALATION) 2 TIMES DAILY
Qty: 30 VIAL | Refills: 0 | Status: ON HOLD
Start: 2021-04-14 | End: 2022-04-15 | Stop reason: SDUPTHER

## 2021-04-14 RX ORDER — BUDESONIDE 0.5 MG/2ML
500 INHALANT ORAL 2 TIMES DAILY
Qty: 30 EACH | Refills: 0 | Status: ON HOLD
Start: 2021-04-14 | End: 2022-04-15 | Stop reason: SDUPTHER

## 2021-04-14 RX ADMIN — FUROSEMIDE 20 MG: 10 INJECTION, SOLUTION INTRAMUSCULAR; INTRAVENOUS at 10:43

## 2021-04-14 RX ADMIN — METHYLPREDNISOLONE SODIUM SUCCINATE 20 MG: 40 INJECTION, POWDER, FOR SOLUTION INTRAMUSCULAR; INTRAVENOUS at 10:37

## 2021-04-14 RX ADMIN — VANCOMYCIN HYDROCHLORIDE 1500 MG: 10 INJECTION, POWDER, LYOPHILIZED, FOR SOLUTION INTRAVENOUS at 13:18

## 2021-04-14 RX ADMIN — IPRATROPIUM BROMIDE 0.5 MG: 0.5 SOLUTION RESPIRATORY (INHALATION) at 07:12

## 2021-04-14 RX ADMIN — SENNOSIDES 8.6 MG: 8.6 TABLET, FILM COATED ORAL at 10:37

## 2021-04-14 RX ADMIN — FUROSEMIDE 20 MG: 20 TABLET ORAL at 10:37

## 2021-04-14 RX ADMIN — FAMOTIDINE 20 MG: 20 TABLET, FILM COATED ORAL at 10:37

## 2021-04-14 RX ADMIN — ARFORMOTEROL TARTRATE 15 MCG: 15 SOLUTION RESPIRATORY (INHALATION) at 07:12

## 2021-04-14 RX ADMIN — PIPERACILLIN AND TAZOBACTAM 3.38 G: 3; .375 INJECTION, POWDER, LYOPHILIZED, FOR SOLUTION INTRAVENOUS at 03:12

## 2021-04-14 RX ADMIN — AMLODIPINE BESYLATE 5 MG: 5 TABLET ORAL at 10:43

## 2021-04-14 RX ADMIN — INSULIN LISPRO 4 UNITS: 100 INJECTION, SOLUTION INTRAVENOUS; SUBCUTANEOUS at 06:47

## 2021-04-14 RX ADMIN — IPRATROPIUM BROMIDE 0.5 MG: 0.5 SOLUTION RESPIRATORY (INHALATION) at 11:01

## 2021-04-14 RX ADMIN — PIPERACILLIN AND TAZOBACTAM 3.38 G: 3; .375 INJECTION, POWDER, LYOPHILIZED, FOR SOLUTION INTRAVENOUS at 10:37

## 2021-04-14 RX ADMIN — FERROUS SULFATE TAB 325 MG (65 MG ELEMENTAL FE) 325 MG: 325 (65 FE) TAB at 10:37

## 2021-04-14 RX ADMIN — BUDESONIDE 500 MCG: 0.5 INHALANT RESPIRATORY (INHALATION) at 07:12

## 2021-04-14 RX ADMIN — IPRATROPIUM BROMIDE 0.5 MG: 0.5 SOLUTION RESPIRATORY (INHALATION) at 15:06

## 2021-04-14 RX ADMIN — DILTIAZEM HYDROCHLORIDE 30 MG: 30 TABLET, FILM COATED ORAL at 11:09

## 2021-04-14 RX ADMIN — DILTIAZEM HYDROCHLORIDE 30 MG: 30 TABLET, FILM COATED ORAL at 06:47

## 2021-04-14 NOTE — DIABETES MGMT
GLYCEMIC CONTROL PROGRESS NOTE:    - chart reviewed, no known h/o DM  - Solumedrol 20 mg Q6 hours, sterios associated hyperglycemia  - BG out of target range ICU: 140-180 mg/dL  - TDD = 12 units - Humalog Normal Insulin Sensitivity Corrective Coverage, recommend continue  - monitor BG trends as steroids taper, corrective coverage will need to be discontinued to minimize risk of hypoglycemia    Recent Glucose Results:   Lab Results   Component Value Date/Time     (H) 04/14/2021 03:00 AM    GLUCPOC 204 (H) 04/14/2021 06:17 AM    GLUCPOC 178 (H) 04/13/2021 09:40 PM    GLUCPOC 250 (H) 04/13/2021 04:35 PM         Guy Mejia MS, RN, CDE  Glycemic Control Team  [phone]  Pager [phone] (M-TH 8:00-4:30P)  *After Hours pager [phone]

## 2021-04-14 NOTE — PROGRESS NOTES
Bedside and Verbal shift change report given to KATHLEEN Quintero, RN (oncoming nurse) by VINCE Arreguin, CAITLIN (offgoing nurse). Report included the following information SBAR, Kardex, Intake/Output, MAR and Recent Results.

## 2021-04-14 NOTE — PROGRESS NOTES
Transition of care to SNF: Марина Segura today @ 3:30 pm     Communication to Patient/Family:  Met with patient and family and they are agreeable to the transition plan. The Plan for Transition of Care is related to the following treatment goals: Acute exacerbation of COPD    The Patient and/or patient representative, wife Donny Willett, was provided with a choice of provider and agrees   with the discharge plan. Yes [x] No []    Freedom of choice list was provided with basic dialogue that supports the patient's individualized plan of care/goals and shares the quality data associated with the providers. Yes [x] No []    SNF/Rehab Transition:  Patient has been accepted to [facility] at [address] [place] [address] for SNF/Rehab and meets criteria for admission. Patient will transported by [facility] and expected to leave at 3:30 pm.    Communication to SNF/Rehab:  Bedside RN has been notified to update the transition plan to the facility and call report [phone]. Discharge information has been updated on the AVS and communicated via [facility] and/or CC link. Discharge instructions to be fax'd to facility at [phone] per request.     Please include all hard scripts for controlled substances, med rec and dc summary in packet. Please medicate for pain prior to dc if possible and needed to help offset delay when patient first arrives to facility. Reviewed and confirmed with facility, [facility] can manage the patient care needs for the following:     Dorota [place] with (X) only those applicable:  Medication:  [x]Medications are available at the facility  []IV Antibiotics    []Controlled Substance - hard copies available sent.   []Weekly Labs    Equipment:  []CPAP/BiPAP  []Wound Vacuum  []Humphreys or Urinary Device  []PICC/Central Line  []Nebulizer  []Ventilator    Treatment:  []Isolation (for MRSA, VRE, etc.)  []Surgical Drain Management  []Tracheostomy Care  [x]Dressing Changes  []Dialysis with transportation  []PEG Care  [x]Oxygen  []Daily Weights for Heart Failure    Dietary:  [x]Any diet limitations  []Tube Feedings   []Total Parenteral Management (TPN)    Financial Resources:  []Medicaid Application Completed    [x]UAI Completed and copy given to pt/family    []A screening has previously been completed. []Level II Completed    [] Private pay individual who will not become   financially eligible for Medicaid within 6 months from admission to a [address] [place] [address] facility. [] Individual refused to have screening conducted. []Medicaid Application Completed    []The screening denied because it was determined individual did not need/did not qualify for nursing facility level of care. [] Out of state residents seeking direct admission to a [address] facility. [] Individuals who are inpatients of an out of state hospital, or in state or out of state veterans/ hospital and seek direct admission to a [address] facility  [] Individuals who are pateints or residents of a state owned/operated facility that is licensed by [employer] ([employer]) and seek direct admission to [address] [place] [address]  [] A screening not required for enrollment in [facility] services as set out in [address] [place] [address] 99-  [] [facility]) staff shall perform screenings of the [facility]3] clients. Advanced Care Plan:  []Surrogate Decision Maker of Care  []POA  []Communicated Code Status and copy sent.     Other:

## 2021-04-14 NOTE — DISCHARGE SUMMARY
Discharge Summary    Patient: Fer Wade MRN: [id number]  CSN: [id number]    YOB: 1945  Age: 66 y.o. Sex: male    DOA: 4/5/2021 LOS:  LOS: 9 days   Discharge Date:      Primary Care Provider:  Karey Tejada MD    Admission Diagnoses: Acute exacerbation of chronic obstructive pulmonary disease (COPD) ([facility] [J44.1]    Discharge Diagnoses:    Hospital Problems  Date Reviewed: 2/27/2021          Codes Class Noted POA    Status post total replacement of left hip ICD-10-CM: N97.892  ICD-9-CM: V43.64  4/14/2021 Unknown        Anemia ICD-10-CM: D64.9  ICD-9-CM: 285.9  4/11/2021 Unknown        Acute respiratory failure with hypoxia and hypercapnia ([facility] ICD-10-CM: J96.01, J96.02  ICD-9-CM: 518.81  4/10/2021 Unknown        Obesity ICD-10-CM: E66.9  ICD-9-CM: 278.00  4/10/2021 Unknown        Acute pulmonary edema ([facility] ICD-10-CM: J81.0  ICD-9-CM: 518.4  4/10/2021 Unknown        Avascular necrosis of bone of left hip ([facility] ICD-10-CM: M87.052  ICD-9-CM: 733.42  4/8/2021 Unknown        Left hip pain ICD-10-CM: M25.552  ICD-9-CM: 719.45  4/5/2021 Unknown        PAF (paroxysmal atrial fibrillation) ([facility] ICD-10-CM: I48.0  ICD-9-CM: 427.31  4/5/2021 Unknown        Debility ICD-10-CM: R53.81  ICD-9-CM: 799.3  7/8/2019 Yes        * (Principal) Acute exacerbation of chronic obstructive pulmonary disease (COPD) ([facility] ICD-10-CM: J44.1  ICD-9-CM: 491.21  2/8/2019 Unknown        Hypertension ICD-10-CM: I10  ICD-9-CM: 401.9  Unknown Yes              Discharge Condition: stable     Discharge Medications:     Current Discharge Medication List      START taking these medications    Details   arformoteroL (BROVANA) 15 mcg/2 mL nebu neb solution 2 mL by Nebulization route two (2) times a day. Qty: 30 Vial, Refills: 0      budesonide (PULMICORT) 0.5 mg/2 mL nbsp 2 mL by Nebulization route two (2) times a day. Qty: 30 Each, Refills: 0      enoxaparin (LOVENOX) 40 mg/0.4 mL 0.4 mL by SubCUTAneous route daily.   Qty: 21 Syringe, Refills: 0      famotidine (PEPCID) 20 mg tablet Take 1 Tab by mouth daily. Qty: 30 Tab, Refills: 0      ferrous sulfate 325 mg (65 mg iron) tablet Take 1 Tab by mouth two (2) times daily (with meals). Qty: 30 Tab, Refills: 0      doxycycline (MONODOX) 100 mg capsule Take 1 Cap by mouth two (2) times a day for 10 days. Qty: 20 Cap, Refills: 0         CONTINUE these medications which have CHANGED    Details   predniSONE (STERAPRED) 5 mg dose pack See administration instruction per 5mg dose pack  Qty: 21 Tab, Refills: 0         CONTINUE these medications which have NOT CHANGED    Details   amLODIPine (NORVASC) 5 mg tablet Take 1 Tab by mouth daily. Qty: 30 Tab, Refills: 0      albuterol-ipratropium (DUO-NEB) 2.5 mg-0.5 mg/3 ml nebu 3 mL by Nebulization route every four (4) hours as needed for Wheezing. Qty: 30 Nebule, Refills: 0      albuterol (PROVENTIL HFA, VENTOLIN HFA, PROAIR HFA) 90 mcg/actuation inhaler Take 2 Puffs by inhalation every four (4) hours as needed for Wheezing or Shortness of Breath. Qty: 1 Inhaler, Refills: 1      OXYGEN-AIR DELIVERY SYSTEMS 2 L/min by Nasal route continuous. furosemide (Lasix) 20 mg tablet Take 20 mg by mouth daily. dilTIAZem (CARDIZEM) 30 mg tablet Take 1 Tab by mouth Before breakfast, lunch, and dinner. Qty: 90 Tab, Refills: 0      acetaminophen (TYLENOL) 325 mg tablet Take 650 mg by mouth every eight (8) hours as needed for Pain. dextran 70/hypromellose (ARTIFICIAL TEARS, PF, OP) Apply 2 Drops to eye as needed for Other (both eyes for dryness). diphenhydrAMINE (BENADRYL) 25 mg capsule Take 25 mg by mouth nightly as needed for Itching. tamsulosin (FLOMAX) 0.4 mg capsule Take 0.4 mg by mouth every evening.              Procedures : left hip placement     Consults: Cardiology, Orthopedics and Pulmonary/Critical Care, nephrologist       PHYSICAL EXAM   Visit Vitals  BP (!) 135/57 (BP 1 Location: Left arm, BP Patient Position: At rest)   Pulse 69   Temp 97.5 °F (36.4 °C)   Resp 16   Ht 5' 7\" (1.702 m)   Wt 98.6 kg (217 lb 4.8 oz)   SpO2 100%   BMI 34.03 kg/m²     General: Awake, cooperative, no acute distress    HEENT: NC, Atraumatic. PERRLA, EOMI. Anicteric sclerae. Lungs:  CTA Bilaterally. No Wheezing/Rhonchi/Rales. Heart:  Regular  rhythm,  No murmur, No Rubs, No Gallops  Abdomen: Soft, Non distended, Non tender. +Bowel sounds,   Extremities: No c/c. Left hip swelling, stable  Hematoma   Psych:   Not anxious or agitated. Neurologic:  No acute neurological deficits. Admission HPI :  Beba You is a 66 y.o. male with history of PAF, COPD,asbetosis, chronic respiratory failure with hypoxia 2 L came to ER due to hip pain and sob. He reported that he has pain in his left hip and knee with difficult ambulation for 2-3 weeks. He used O2 at home, reported sob worsening for 1-2 weeks. He received iv steroid and breathing tx in ER, x-ray of hip/chest/knee no acute process. He was found to sodium 126 potassium 2.8. He received 40 M EQ p.o. potassium and 10 M EQ potassium per IV.     He is hard hearing with hearing aid. He reported leg swelling for months. Hospital Course :     Patient was admitted due to hyponatremia and hypokalemia and COPD exacerbation. He also reported pain in his left hip-unable to put wt on.        Acute COPD exacerbation -iv steroid has been started since admission with empiric treatment for copd exacerbation. Pulmonologist was on board. He needs continue breathing tx and continue doxy. He has chronic respiratory failure with home 2 L O2, he was put on bipap during his icu stay and narcotics was hold due to respiratory issue. On discharge, he has no wheezing, his sob was back to his baseline. He had hip pain on admission, ct indicated AVN, DR. Reyes Blalock did left hip replacement. He was in icu after surgery. AC was hold due to hematoma of left hip.  He received blood transfusion and his h/h was monitored closely. His h/h is stable, he is high risk for dvt, recommend to try lovenox for dvt prophylaxis and monitoring h/h as out-pt. Cardiologist was on board for cardiac clearance and recommend AC after surgery-hold it due to hematoma. Recommend to try low dose lovenox -if tolerated well, can start low dose eliquis 2.5 mg twice a day. He also has leukocytosis, he remained afebrile , like due to steroid induced. Nephrologist was on board for hyponatremia-resolved on discharge.        Discharge planning discussed with patient, pt agrees  with the plan and no questions and concerns at this point. Activity: Activity as tolerated    Diet: Cardiac Diet    Follow-up: PCP, pulmonologist, cardiologist, orthopedics     Disposition: rehab     Minutes spent on discharge: 45 min       Labs: Results:       Chemistry Recent Labs     04/14/21  0300 04/13/21  0615 04/12/21  0409   * 160* 149*    139 136   K 3.5 3.5 3.6    100 98*   CO2 30 31 31   BUN 55* 51* 51*   CREA 1.36* 1.51* 1.67*   CA 6.9* 7.1* 7.1*   AGAP 8 8 7   BUCR 40* 34* 31*   ALB 2.4* 2.3* 2.1*      CBC w/Diff Recent Labs     04/14/21  0300 04/13/21  1700 04/13/21  1210 04/13/21  0615 04/12/21  0409 04/12/21  0409   WBC 17.7*  --   --  16.7*  --  15.7*   RBC 2.70*  --   --  2.67*  --  2.59*   HGB 8.1* 7.6* 8.7* 7.8*   < > 7.6*   HCT 24.1* 22.0* 26.1* 23.2*   < > 22.8*     --   --  244  --  241   GRANS 84*  --   --  88*  --  87*   LYMPH 3*  --   --  3*  --  4*   EOS 0  --   --  0  --  0    < > = values in this interval not displayed. Cardiac Enzymes No results for input(s): CPK, CKND1, WILLARD in the last 72 hours. No lab exists for component: CKRMB, TROIP   Coagulation No results for input(s): PTP, INR, APTT, INREXT, INREXT in the last 72 hours.     Lipid Panel No results found for: CHOL, CHOLPOCT, CHOLX, CHLST, CHOLV, 461845, HDL, HDLP, LDL, LDLC, DLDLP, 051003, VLDLC, VLDL, TGLX, TRIGL, TRIGP, TGLPOCT, CHHD, CHHDX   BNP No results for input(s): BNPP in the last 72 hours. Liver Enzymes Recent Labs     04/14/21  0300   ALB 2.4*      Thyroid Studies Lab Results   Component Value Date/Time    TSH 3.06 04/05/2021 01:30 PM          @micro    Significant Diagnostic Studies: Xr Hip Lt W Or Wo Pelv 2-3 Vws    Result Date: 4/11/2021  EXAM: 2 views left hip CLINICAL INDICATION/HISTORY: Left hip pain COMPARISON: 4/5/2021 _______________ FINDINGS: Status post left arthroplasty. Hardware is intact and aligned. No fracture or dislocation. Soft tissue swelling and gas around the left hip from the recent surgery. _______________     1. Status post left hip arthroplasty with no fracture or dislocation identified. Soft tissue swelling and gas identified around the left hip from recent surgery. Xr Hip Lt W Or Wo Pelv 2-3 Vws    Result Date: 4/5/2021  EXAM: LEFT HIP RADIOGRAPHS CLINICAL INDICATION/HISTORY: left hip pain, difficulty ambulating -Additional: None COMPARISON: Supine with 2/26/2021 TECHNIQUE: AP pelvis and frog-leg lateral view of left hip. _______________ FINDINGS: BONES: Severe left hip osteoarthrosis with femoral head remodeling and collapse. No evidence of acute displaced fracture or dislocation. SOFT TISSUES: Unremarkable. _______________     Severe left hip osteoarthrosis with femoral head collapse/remodeling. Sclerosis and lucency in femoral head may reflect collapse/remodeling versus AVN. Consider MRI in the appropriate clinical setting. No evidence of acute fracture or dislocation. Xr Knee Lt Max 2 Vws    Result Date: 4/5/2021  HISTORY: -Provided on order: pain, difficulty ambulating -Additional: None Technique: 2 views of left knee are presented for interpretation. Comparison study: none. Findings: The bones are osteopenic. There is no plain film evident fracture or dislocation. No radiopaque foreign body or significant arthropathy. No significant arthropathy. No plain film evident knee joint effusion is seen.       1. No acute bony abnormality is identified by plain films. Intrinsic knee ligamentous, meniscal and cartilaginous integrity can be best evaluated by routine knee MRI if clinically warranted. Cta Chest W Or W Wo Cont    Result Date: 4/11/2021  EXAM: CTA chest CLINICAL INDICATION/HISTORY: Shortness of breath COMPARISON: Chest radiograph dated 4/10/2021 chest CTA dated 10/31/2019 TECHNIQUE: Axial CT imaging from the thoracic inlet through the diaphragm with intravenous contrast. Coronal and sagittal MIP reformats were generated. One or more dose reduction techniques were used on this CT: automated exposure control, adjustment of the mAs and/or kVp according to patient size, and iterative reconstruction techniques. The specific techniques used on this CT exam have been documented in the patient's electronic medical record. Digital Imaging and Communications in Medicine (DICOM) format image data are available to nonaffiliated external healthcare facilities or entities on a secured, media free, reciprocally searchable basis with patient authorization for at least a 12-month period after this study. _______________ FINDINGS: EXAM QUALITY: Adequate PULMONARY ARTERIES: No pulmonary embolism identified. MEDIASTINUM: Normal heart size. No aortic dissection. Atherosclerotic calcifications in the aorta and coronary arteries. No pericardial effusion. LUNGS: Scattered atelectasis and scarring. No focal consolidation. No suspicious nodules or masses. AIRWAY: Normal. PLEURA: Chronic right-sided pleural calcifications with chronic pleural collections. No pneumothorax. LYMPH NODES: No enlarged nodes. UPPER ABDOMEN: Calcified granuloma in the liver. Right upper pole renal cyst is unchanged from 10/31/2019. No follow-up imaging necessary. BONES: No acute or aggressive osseous abnormalities identified. OTHER: None. _______________     1. No pulmonary embolism identified.  2. Chronic right-sided pleural calcifications and chronic pleural collections which are unchanged compared to prior exams. No new focal consolidation. Scattered atelectasis and/or scarring in the lungs. Ct Pelv W Cont    Result Date: 4/11/2021  EXAM: CT of the pelvis CLINICAL INDICATION/HISTORY: Recent left hip arthroplasty with left hip pain COMPARISON: CT left hip dated 4/6/2021, left hip radiographs dated 4/11/2021 TECHNIQUE: Axial CT imaging of the pelvis was performed with intravenous contrast. Multiplanar reformats were generated. One or more dose reduction techniques were used on this CT: automated exposure control, adjustment of the mAs and/or kVp according to patient size, and iterative reconstruction techniques. The specific techniques used on this CT exam have been documented in the patient's electronic medical record. Digital Imaging and Communications in Medicine (DICOM) format image data are available to nonaffiliated external healthcare facilities or entities on a secured, media free, reciprocally searchable basis with patient authorization for at least a 12-month period after this study. _______________ FINDINGS: PELVIC ORGANS: Mild bladder wall thickening. Limited evaluation of the prostate secondary to streak artifact from left hip arthroplasty. LYMPH NODES: No enlarged lymph nodes. GASTROINTESTINAL TRACT: Moderate stool burden in the rectum. No bowel obstruction identified in the lower abdomen or pelvis. VASCULATURE: Aortoiliac atherosclerosis. BONES: Recent left hip arthroplasty. Hardware appears intact. No dislocation. No acute fracture identified. Soft tissue swelling, hematoma, and gas identified around the left hip from the recent surgery. More focal organized hematoma near the left hip on image 57 measures 3.4 cm. Hematoma also seen tracking along the thigh musculature. OTHER: Small fat-containing left inguinal hernia. _______________     1. Recent left hip arthroplasty with no fracture or dislocation identified.  Soft tissue swelling, hematoma, and gas identified around the left hip from the recent surgery. Ct Hip Lt Wo Cont    Result Date: 4/6/2021  --------------------------------------------------------------------------- <<<<<<<<<           [facility]           >>>>>>>>> --------------------------------------------------------------------------- HISTORY: -From Provider:r/o avn, not candidate for mri -Additional: None COMPARISON EXAMINATIONS:   None. TECHNIQUE:   CT of the left hip without intravenous contrast administration. Coronal and sagittal reformats were generated and reviewed. One or more dose reduction techniques were used on this CT: automated exposure control, adjustment of the mAs and/or kVp according to patient size, and iterative reconstruction techniques. The specific techniques used on this CT exam have been documented in the patient's electronic medical record. --------------------------     FINDINGS    -------------------------- OSSEOUS STRUCTURES:  Left hip: There is irregular subchondral lucency and cortical irregularity with slight flattening/collapse of the left femoral head. Severe degenerative changes in the right hip with severe joint space narrowing, subchondral cysts and slight marginal spurring. Soft tissue thickening about the joint space suggesting hip joint effusion. Visualized bony pelvis and proximal femur: Degenerative changes in the SI joints and spine. Relatively severe central and foraminal stenosis at L4/5 and to lesser degree at L3/4. Visualized pelvic soft tissues: LYMPH NODES:  Subcentimeter short axis left groin lymph nodes are present. [ ] GI TRACT:  Colonic diverticulosis, without CT evidence diverticulitis. Feculent distention of the rectum. PELVIC ORGANS:  The visualized portion of the bladder is unremarkable. VASCULATURE:  Atherosclerotic calcification in visualized left iliac and femoral arteries. OTHER:   No ascites or free intraperitoneal air in visualized extent. Fat-containing left inguinal hernia. Fatty change in the gluteal musculature bilaterally. Subcutaneous stranding/edema along left more than right lateral flank. ---------------------------------------------------------------------------    --------------------------------------------------------------------------- 1. Subchondral serpiginous lucencies in the weightbearing left femoral head suspicious for avascular necrosis, with associated cortical irregularity in keeping with flattening/collapse. Suspected left hip joint effusion. 2. Relatively severe mild degenerative changes in the left hip. Mild degenerative changes in the right hip. Degenerative changes in the SI joints and spine. In the setting of radiculopathy, routine lumbar spine MRI could best further assess. Xr Chest Port    Result Date: 4/10/2021  EXAM: PORTABLE CHEST HISTORY: Chest pain and shortness of breath. COMPARISON: 4/5/2021 TECHNIQUE: Single portable view. _______________ FINDINGS: SUPPORT DEVICES: None HEART AND MEDIASTINUM: Cardiac size is normal. Mediastinal contours are normal. Normal pulmonary vasculature. LUNGS AND PLEURAL SPACES: Right base pleuroparenchymal scarring without change. Multiple areas of right pleural calcification without change. BONES AND SOFT TISSUES: Bony structures are normal. Chronic elevation right hemidiaphragm. _______________     No evidence of active pulmonary process. Chronic right basilar pleuroparenchymal scarring and multiple right sided pleural plaques without change. Xr Chest Port    Result Date: 4/5/2021  HISTORY: -Provided with order: SOB -Additional: Bilateral lower extremity swelling. Technique : AP PORTABLE CHEST Comparison : 02/28/21 FINDINGS: Patient is rotated to the right. The chin obscures the upper thorax. There is mild motion degradation and external artifact which additionally limits visibility. HEART AND MEDIASTINUM: Unremarkable.  LUNGS AND PLEURAL SPACES: Calcified pleural plaques present on the right. Vague opacity in the right infrahilar region is less evident than the prior, but degraded by motion. There is stable elevation of the right diaphragm. BONY THORAX AND SOFT TISSUES: No acute osseous abnormality. Improved aeration with mild, less evident ill-defined opacity in the right infrahilar region, which can reflect chronic atelectasis or infiltrate. No additional change. Maximiliano Kenny Technologist Service    Result Date: 4/13/2021  See impression. Fluoroscopy was provided for this procedure under the supervision and/or direction of the attending provider. ? For further information regarding this procedure, see patient medical record. Xr Hip Vernette Aaron Or Wo Pelv  1 Vw    Result Date: 4/9/2021  EXAM:LEFT HIP HISTORY: Left hip pain. COMPARISON: 4/5/2021 TECHNIQUE: Single AP view _______________ FINDINGS: BONES: Left hip total arthroplasty with prosthetic components properly positioned and aligned on the single limited frontal view. No additional bony abnormality. SOFT TISSUES: Expected reactive and emphysematous changes in soft tissues adjacent to left hip. _______________     Normal postoperative appearance left hip arthroplasty with expected postoperative changes in adjacent soft tissues. Echo Stress    Addendum Date: 4/8/2021    · Baseline ECG: Normal EKG. · Stress test: Negative stress test. Exercise stress test: EKG stress test results correlate with a low risk of inducible myocardial ischemia. · Echo: Normal stress echocardiogram. · Technically difficult study due to severe COPD and suboptimal echocardiographic windows in spite of using definity contrast reduces accurarcy of test.  Overall Normal Dobutamine stress echocardiogram without evidence of ischemia. Resting EF 55% Peak dubatminestress EF 75%. Discussed with patient. Result Date: 4/8/2021  · Baseline ECG: Normal EKG.  · Stress test: Negative stress test. Exercise stress test: EKG stress test results correlate with a low risk of inducible myocardial ischemia. · Echo: Normal stress echocardiogram. · Technically difficult study due to severe COPD and suboptimal echocardiographic windows in spite of using definity contrast reduces accurarcy of test.  Overall Normal Dobutamine stress echocardiogram without evidence of ischemia. Resting EF 55% Peak dubatminestress EF 75%. Discussed with patient. Duplex Lower Ext Venous Bilat    Result Date: 4/12/2021  · No evidence of deep vein thrombosis in the right lower extremity. · No evidence of deep vein thrombosis in the left lower extremity. Duplex Lower Ext Venous Bilat    Result Date: 4/5/2021  · No evidence of deep vein thrombosis in the right lower extremity veins assessed. · No evidence of deep vein thrombosis in the left lower extremity veins assessed.               [facility]     CC: Nisreen Ngo MD

## 2021-04-16 NOTE — PROGRESS NOTES
Physician Progress Note      Kiara Miles  CSN #:                  [id number]  :                       1945  ADMIT DATE:       2021 1:08 PM  [address] [place] DATE:        2021 5:11 PM  RESPONDING  PROVIDER #:        Pepe Chacon MD          QUERY TEXT:    Patient admitted with COPD exacerbation. Noted documentation of sepsis, not present on admission in response to previous query on 21, however Sepsis is not mentioned in further progress notes or discharge summary. In order to support the diagnosis of sepsis, please include additional clinical indicators in your documentation. Or please document if the diagnosis of sepsis has been ruled out after further study. The medical record reflects the following:  Risk Factors:PAF, COPD, chronic resp failure, asbestosis  Clinical Indicators: Pt presented w/ COPD exacerbation and placed on IV steroids. WBC up to 14.4. VS: T 97.1-98.6 P 83-99 RR 20-25  Additionally patient was found to have AVN and underwent a L THR on 21  Per the DC summary: He also has leukocytosis, he remained afebrile , like due to steroid induced. Treatment: IV Zosyn, Vanco, Solumedrol, duonebs,  Options provided:  -- Sepsis present as evidenced by, Please document evidence. -- Sepsis was ruled out after study  -- Other - I will add my own diagnosis  -- Disagree - Not applicable / Not valid  -- Disagree - Clinically unable to determine / Unknown  -- Refer to Clinical Documentation Reviewer    PROVIDER RESPONSE TEXT:    Sepsis was ruled out after study.     Query created by: Jesse Stevens on 2021 2:30 PM      Electronically signed by:  Pepe Chacon MD 2021 8:33 AM

## 2021-04-18 ENCOUNTER — APPOINTMENT (OUTPATIENT)
Dept: VASCULAR SURGERY | Age: 76
DRG: 920 | End: 2021-04-18
Attending: PHYSICIAN ASSISTANT
Payer: MEDICARE

## 2021-04-18 ENCOUNTER — HOSPITAL ENCOUNTER (INPATIENT)
Age: 76
LOS: 5 days | Discharge: NURSING, REHAB OR LONG-TERM CARE | DRG: 920 | End: 2021-04-23
Attending: EMERGENCY MEDICINE | Admitting: INTERNAL MEDICINE
Payer: MEDICARE

## 2021-04-18 ENCOUNTER — APPOINTMENT (OUTPATIENT)
Dept: CT IMAGING | Age: 76
DRG: 920 | End: 2021-04-18
Attending: PHYSICIAN ASSISTANT
Payer: MEDICARE

## 2021-04-18 ENCOUNTER — APPOINTMENT (OUTPATIENT)
Dept: GENERAL RADIOLOGY | Age: 76
DRG: 920 | End: 2021-04-18
Attending: INTERNAL MEDICINE
Payer: MEDICARE

## 2021-04-18 DIAGNOSIS — M96.840 POSTOPERATIVE HEMATOMA OF MUSCULOSKELETAL STRUCTURE FOLLOWING MUSCULOSKELETAL PROCEDURE: ICD-10-CM

## 2021-04-18 DIAGNOSIS — E83.51 HYPOCALCEMIA: ICD-10-CM

## 2021-04-18 DIAGNOSIS — L03.90 CELLULITIS, UNSPECIFIED CELLULITIS SITE: Primary | ICD-10-CM

## 2021-04-18 PROBLEM — M25.559 ACUTE HIP PAIN: Status: ACTIVE | Noted: 2021-04-18

## 2021-04-18 LAB
ALBUMIN SERPL-MCNC: 2.4 G/DL (ref 3.4–5)
ALBUMIN/GLOB SERPL: 0.8 {RATIO} (ref 0.8–1.7)
ALP SERPL-CCNC: 78 U/L (ref 45–117)
ALT SERPL-CCNC: 33 U/L (ref 16–61)
ANION GAP SERPL CALC-SCNC: 9 MMOL/L (ref 3–18)
APTT PPP: 32.4 SEC (ref 23–36.4)
AST SERPL-CCNC: 25 U/L (ref 10–38)
BASOPHILS # BLD: 0 K/UL (ref 0–0.1)
BASOPHILS NFR BLD: 0 % (ref 0–2)
BILIRUB SERPL-MCNC: 0.5 MG/DL (ref 0.2–1)
BNP SERPL-MCNC: 73 PG/ML (ref 0–1800)
BUN SERPL-MCNC: 43 MG/DL (ref 7–18)
BUN/CREAT SERPL: 32 (ref 12–20)
CALCIUM SERPL-MCNC: 6.4 MG/DL (ref 8.5–10.1)
CHLORIDE SERPL-SCNC: 98 MMOL/L (ref 100–111)
CO2 SERPL-SCNC: 29 MMOL/L (ref 21–32)
CREAT SERPL-MCNC: 1.35 MG/DL (ref 0.6–1.3)
DIFFERENTIAL METHOD BLD: ABNORMAL
EOSINOPHIL # BLD: 0.1 K/UL (ref 0–0.4)
EOSINOPHIL NFR BLD: 0 % (ref 0–5)
ERYTHROCYTE [DISTWIDTH] IN BLOOD BY AUTOMATED COUNT: 15.1 % (ref 11.6–14.5)
ERYTHROCYTE [SEDIMENTATION RATE] IN BLOOD: 134 MM/HR (ref 0–20)
GLOBULIN SER CALC-MCNC: 3 G/DL (ref 2–4)
GLUCOSE SERPL-MCNC: 125 MG/DL (ref 74–99)
HCT VFR BLD AUTO: 24.2 % (ref 36–48)
HGB BLD-MCNC: 7.8 G/DL (ref 13–16)
INR PPP: 1.1 (ref 0.8–1.2)
LACTATE BLD-SCNC: 1.4 MMOL/L (ref 0.4–2)
LYMPHOCYTES # BLD: 1.1 K/UL (ref 0.9–3.6)
LYMPHOCYTES NFR BLD: 5 % (ref 21–52)
MCH RBC QN AUTO: 30 PG (ref 24–34)
MCHC RBC AUTO-ENTMCNC: 32.2 G/DL (ref 31–37)
MCV RBC AUTO: 93.1 FL (ref 74–97)
MONOCYTES # BLD: 1.1 K/UL (ref 0.05–1.2)
MONOCYTES NFR BLD: 6 % (ref 3–10)
NEUTS SEG # BLD: 16.7 K/UL (ref 1.8–8)
NEUTS SEG NFR BLD: 84 % (ref 40–73)
PLATELET # BLD AUTO: 288 K/UL (ref 135–420)
PMV BLD AUTO: 9.6 FL (ref 9.2–11.8)
POTASSIUM SERPL-SCNC: 3.7 MMOL/L (ref 3.5–5.5)
PROT SERPL-MCNC: 5.4 G/DL (ref 6.4–8.2)
PROTHROMBIN TIME: 13.7 SEC (ref 11.5–15.2)
RBC # BLD AUTO: 2.6 M/UL (ref 4.35–5.65)
SODIUM SERPL-SCNC: 136 MMOL/L (ref 136–145)
WBC # BLD AUTO: 19.7 K/UL (ref 4.6–13.2)

## 2021-04-18 PROCEDURE — 94762 N-INVAS EAR/PLS OXIMTRY CONT: CPT

## 2021-04-18 PROCEDURE — 74011000250 HC RX REV CODE- 250: Performed by: PHYSICIAN ASSISTANT

## 2021-04-18 PROCEDURE — 83605 ASSAY OF LACTIC ACID: CPT

## 2021-04-18 PROCEDURE — 74011000636 HC RX REV CODE- 636: Performed by: EMERGENCY MEDICINE

## 2021-04-18 PROCEDURE — 93005 ELECTROCARDIOGRAM TRACING: CPT

## 2021-04-18 PROCEDURE — 86140 C-REACTIVE PROTEIN: CPT

## 2021-04-18 PROCEDURE — 83880 ASSAY OF NATRIURETIC PEPTIDE: CPT

## 2021-04-18 PROCEDURE — 85610 PROTHROMBIN TIME: CPT

## 2021-04-18 PROCEDURE — 87205 SMEAR GRAM STAIN: CPT

## 2021-04-18 PROCEDURE — 65270000029 HC RM PRIVATE

## 2021-04-18 PROCEDURE — 85730 THROMBOPLASTIN TIME PARTIAL: CPT

## 2021-04-18 PROCEDURE — 80053 COMPREHEN METABOLIC PANEL: CPT

## 2021-04-18 PROCEDURE — 99285 EMERGENCY DEPT VISIT HI MDM: CPT

## 2021-04-18 PROCEDURE — 74011250636 HC RX REV CODE- 250/636: Performed by: INTERNAL MEDICINE

## 2021-04-18 PROCEDURE — 74011250636 HC RX REV CODE- 250/636: Performed by: PHYSICIAN ASSISTANT

## 2021-04-18 PROCEDURE — 96374 THER/PROPH/DIAG INJ IV PUSH: CPT

## 2021-04-18 PROCEDURE — 71045 X-RAY EXAM CHEST 1 VIEW: CPT

## 2021-04-18 PROCEDURE — 85025 COMPLETE CBC W/AUTO DIFF WBC: CPT

## 2021-04-18 PROCEDURE — 74011250637 HC RX REV CODE- 250/637: Performed by: PHYSICIAN ASSISTANT

## 2021-04-18 PROCEDURE — 85652 RBC SED RATE AUTOMATED: CPT

## 2021-04-18 PROCEDURE — 96375 TX/PRO/DX INJ NEW DRUG ADDON: CPT

## 2021-04-18 PROCEDURE — 93971 EXTREMITY STUDY: CPT

## 2021-04-18 PROCEDURE — 96361 HYDRATE IV INFUSION ADD-ON: CPT

## 2021-04-18 PROCEDURE — 74177 CT ABD & PELVIS W/CONTRAST: CPT

## 2021-04-18 PROCEDURE — 87040 BLOOD CULTURE FOR BACTERIA: CPT

## 2021-04-18 RX ORDER — VANCOMYCIN 1.75 GRAM/500 ML IN 0.9 % SODIUM CHLORIDE INTRAVENOUS
1750
Status: DISCONTINUED | OUTPATIENT
Start: 2021-04-19 | End: 2021-04-19

## 2021-04-18 RX ORDER — ONDANSETRON 2 MG/ML
4 INJECTION INTRAMUSCULAR; INTRAVENOUS
Status: DISCONTINUED | OUTPATIENT
Start: 2021-04-18 | End: 2021-04-23 | Stop reason: HOSPADM

## 2021-04-18 RX ORDER — OXYCODONE AND ACETAMINOPHEN 5; 325 MG/1; MG/1
1 TABLET ORAL
Status: COMPLETED | OUTPATIENT
Start: 2021-04-18 | End: 2021-04-18

## 2021-04-18 RX ORDER — VANCOMYCIN 2 GRAM/500 ML IN 0.9 % SODIUM CHLORIDE INTRAVENOUS
2000 ONCE
Status: COMPLETED | OUTPATIENT
Start: 2021-04-18 | End: 2021-04-18

## 2021-04-18 RX ORDER — SODIUM CHLORIDE 0.9 % (FLUSH) 0.9 %
5-40 SYRINGE (ML) INJECTION EVERY 8 HOURS
Status: DISCONTINUED | OUTPATIENT
Start: 2021-04-18 | End: 2021-04-23 | Stop reason: HOSPADM

## 2021-04-18 RX ORDER — ALBUTEROL SULFATE 90 UG/1
2 AEROSOL, METERED RESPIRATORY (INHALATION)
Status: DISCONTINUED | OUTPATIENT
Start: 2021-04-18 | End: 2021-04-18

## 2021-04-18 RX ORDER — ACETAMINOPHEN 325 MG/1
650 TABLET ORAL
Status: DISCONTINUED | OUTPATIENT
Start: 2021-04-18 | End: 2021-04-23 | Stop reason: SDUPTHER

## 2021-04-18 RX ORDER — DILTIAZEM HYDROCHLORIDE 30 MG/1
30 TABLET, FILM COATED ORAL
Status: DISCONTINUED | OUTPATIENT
Start: 2021-04-19 | End: 2021-04-20

## 2021-04-18 RX ORDER — FAMOTIDINE 20 MG/1
20 TABLET, FILM COATED ORAL DAILY
Status: DISCONTINUED | OUTPATIENT
Start: 2021-04-19 | End: 2021-04-23 | Stop reason: HOSPADM

## 2021-04-18 RX ORDER — TAMSULOSIN HYDROCHLORIDE 0.4 MG/1
0.4 CAPSULE ORAL EVERY EVENING
Status: DISCONTINUED | OUTPATIENT
Start: 2021-04-19 | End: 2021-04-23 | Stop reason: HOSPADM

## 2021-04-18 RX ORDER — ENOXAPARIN SODIUM 100 MG/ML
40 INJECTION SUBCUTANEOUS DAILY
Status: DISCONTINUED | OUTPATIENT
Start: 2021-04-19 | End: 2021-04-18 | Stop reason: SDUPTHER

## 2021-04-18 RX ORDER — ENOXAPARIN SODIUM 100 MG/ML
40 INJECTION SUBCUTANEOUS DAILY
Status: DISCONTINUED | OUTPATIENT
Start: 2021-04-19 | End: 2021-04-23 | Stop reason: HOSPADM

## 2021-04-18 RX ORDER — ARFORMOTEROL TARTRATE 15 UG/2ML
15 SOLUTION RESPIRATORY (INHALATION) 2 TIMES DAILY
Status: DISCONTINUED | OUTPATIENT
Start: 2021-04-19 | End: 2021-04-19

## 2021-04-18 RX ORDER — ACETAMINOPHEN 650 MG/1
650 SUPPOSITORY RECTAL
Status: DISCONTINUED | OUTPATIENT
Start: 2021-04-18 | End: 2021-04-23 | Stop reason: HOSPADM

## 2021-04-18 RX ORDER — FUROSEMIDE 20 MG/1
20 TABLET ORAL DAILY
Status: DISCONTINUED | OUTPATIENT
Start: 2021-04-19 | End: 2021-04-19

## 2021-04-18 RX ORDER — PROMETHAZINE HYDROCHLORIDE 25 MG/1
12.5 TABLET ORAL
Status: DISCONTINUED | OUTPATIENT
Start: 2021-04-18 | End: 2021-04-23 | Stop reason: HOSPADM

## 2021-04-18 RX ORDER — ACETAMINOPHEN 325 MG/1
650 TABLET ORAL
Status: DISCONTINUED | OUTPATIENT
Start: 2021-04-18 | End: 2021-04-23 | Stop reason: HOSPADM

## 2021-04-18 RX ORDER — BUDESONIDE 0.5 MG/2ML
500 INHALANT ORAL 2 TIMES DAILY
Status: DISCONTINUED | OUTPATIENT
Start: 2021-04-19 | End: 2021-04-19

## 2021-04-18 RX ORDER — SODIUM CHLORIDE 0.9 % (FLUSH) 0.9 %
5-40 SYRINGE (ML) INJECTION AS NEEDED
Status: DISCONTINUED | OUTPATIENT
Start: 2021-04-18 | End: 2021-04-23 | Stop reason: HOSPADM

## 2021-04-18 RX ORDER — AMLODIPINE BESYLATE 5 MG/1
5 TABLET ORAL DAILY
Status: DISCONTINUED | OUTPATIENT
Start: 2021-04-19 | End: 2021-04-23 | Stop reason: HOSPADM

## 2021-04-18 RX ORDER — LANOLIN ALCOHOL/MO/W.PET/CERES
325 CREAM (GRAM) TOPICAL 2 TIMES DAILY WITH MEALS
Status: DISCONTINUED | OUTPATIENT
Start: 2021-04-19 | End: 2021-04-23 | Stop reason: HOSPADM

## 2021-04-18 RX ORDER — POLYETHYLENE GLYCOL 3350 17 G/17G
17 POWDER, FOR SOLUTION ORAL DAILY PRN
Status: DISCONTINUED | OUTPATIENT
Start: 2021-04-18 | End: 2021-04-23 | Stop reason: HOSPADM

## 2021-04-18 RX ORDER — IPRATROPIUM BROMIDE AND ALBUTEROL SULFATE 2.5; .5 MG/3ML; MG/3ML
3 SOLUTION RESPIRATORY (INHALATION)
Status: DISCONTINUED | OUTPATIENT
Start: 2021-04-18 | End: 2021-04-23 | Stop reason: HOSPADM

## 2021-04-18 RX ADMIN — METHYLPREDNISOLONE SODIUM SUCCINATE 40 MG: 40 INJECTION, POWDER, FOR SOLUTION INTRAMUSCULAR; INTRAVENOUS at 23:47

## 2021-04-18 RX ADMIN — Medication 10 ML: at 23:47

## 2021-04-18 RX ADMIN — IOPAMIDOL 100 ML: 612 INJECTION, SOLUTION INTRAVENOUS at 17:03

## 2021-04-18 RX ADMIN — SODIUM CHLORIDE 1000 ML: 900 INJECTION, SOLUTION INTRAVENOUS at 18:04

## 2021-04-18 RX ADMIN — OXYCODONE HYDROCHLORIDE AND ACETAMINOPHEN 1 TABLET: 5; 325 TABLET ORAL at 18:25

## 2021-04-18 RX ADMIN — VANCOMYCIN HYDROCHLORIDE 2000 MG: 10 INJECTION, POWDER, LYOPHILIZED, FOR SOLUTION INTRAVENOUS at 17:56

## 2021-04-18 RX ADMIN — CEFEPIME HYDROCHLORIDE 2 G: 2 INJECTION, POWDER, FOR SOLUTION INTRAVENOUS at 17:52

## 2021-04-18 NOTE — PROGRESS NOTES
Pharmacy Dosing Services: Vancomycin    Consult for Vancomycin Dosing by Pharmacy by TIRNA Alfaro  Consult provided for this 66y.o. year old male , for indication of Bone and Joint Infection. Day of Therapy 1    Ht Readings from Last 1 Encounters:   04/18/21 172.7 cm (68\")        Wt Readings from Last 1 Encounters:   04/18/21 105.6 kg (232 lb 11.2 oz)        Other Current Antibiotics Cefepime    Significant Cultures pending   Serum Creatinine Lab Results   Component Value Date/Time    Creatinine 1.35 (H) 04/18/2021 03:17 PM    Creatinine, POC 1.3 10/30/2019 03:27 PM      Creatinine Clearance Estimated Creatinine Clearance: 54 mL/min (A) (based on SCr of 1.35 mg/dL (H)). BUN Lab Results   Component Value Date/Time    BUN 43 (H) 04/18/2021 03:17 PM    BUN, POC 30 (H) 10/30/2019 03:27 PM      WBC Lab Results   Component Value Date/Time    WBC 19.7 (H) 04/18/2021 03:17 PM      H/H Lab Results   Component Value Date/Time    HGB 7.8 (L) 04/18/2021 03:17 PM      Platelets Lab Results   Component Value Date/Time    PLATELET 475 18/63/0908 03:17 PM      Temp 98.8 °F (37.1 °C)     Start Vancomycin therapy, with loading dose:  Vancomycin 2000 mg IV once, scheduled for 4/18/21 at ~ 16:45  Follow with maintenance dose:  Vancomycin 1750 mg IV q18h    Dose calculated to approximate a therapeutic trough of 15 - 20 mcg/mL. Pharmacy to follow daily and will make changes to dose and/or frequency based on clinical status.   Pharmacist Theresa Bueno, [address]

## 2021-04-18 NOTE — ED NOTES
Pts wife updated via telephone at this time by this RN. Phone call transferred to pts room, pt is awake and alert and on the phone with his wife at this time.  Call bell working and within reach

## 2021-04-18 NOTE — PROGRESS NOTES
Pharmacy Renal Dosing Services:     Cefepime was automatically dose-adjusted per THE [facility] P&T Committee Protocol, with respect to renal function. Consult provided for this   66 y.o. , male , for the indication of Bone and Joint Infection. Dose adjusted to:  Cefepime 2 grams IV q12h    Pt Weight:   Wt Readings from Last 1 Encounters:   04/18/21 105.6 kg (232 lb 11.2 oz)     Previous Regimen    Cefepime 2 grams IV q8h   Serum Creatinine Lab Results   Component Value Date/Time    Creatinine 1.35 (H) 04/18/2021 03:17 PM    Creatinine, POC 1.3 10/30/2019 03:27 PM       Creatinine Clearance Estimated Creatinine Clearance: 54 mL/min (A) (based on SCr of 1.35 mg/dL (H)). BUN Lab Results   Component Value Date/Time    BUN 43 (H) 04/18/2021 03:17 PM    BUN, POC 30 (H) 10/30/2019 03:27 PM         Pharmacy to continue to monitor patient daily. Will make dosage adjustments based upon changing renal function.   Signed Anatoly Kaur information:  [phone]

## 2021-04-18 NOTE — ED NOTES
shift change report given to Clare Mcghee RN (oncoming nurse) by Geovanni Marroquin RN (offgoingVerbal nurse). Report included the following information SBAR, Kardex, ED Summary, [facility] and Recent Results.

## 2021-04-18 NOTE — Clinical Note
Status[de-identified] INPATIENT [101]   Type of Bed: Medical [8]   Inpatient Hospitalization Certified Necessary for the Following Reasons: 3.  Patient receiving treatment that can only be provided in an inpatient setting (further clarification in H&P documentation)   Admitting Diagnosis: Cellulitis [737672]   Admitting Diagnosis: Acute hip pain [[id number]]   Admitting Physician: Vickie Carrillo [[id number]]   Attending Physician: Vickie Carrillo [[id number]]   Estimated Length of Stay: 2 Midnights   Discharge Plan[de-identified] Home with Office Follow-up

## 2021-04-18 NOTE — ED PROVIDER NOTES
EMERGENCY DEPARTMENT HISTORY AND PHYSICAL EXAM    Date: 4/18/2021  Patient Name: Balaji Oliveros    History of Presenting Illness     Chief Complaint   Patient presents with    Post OP Complication     left hip sx on 4/9       History Provided By: Patient    Chief Complaint: left hip pain     Additional History (Context):   4:08 PM  Balaji Oliveros is a 66 y.o. male with PMHX HTN, COPD, paroxysmal A. 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 Anne Marie Tran presents to the emergency department via EMS from [facility] C/O left hip pain and inability to move the hip surgery. Patient has had swelling to the left hip and leg as well as his scrotum. States his hip is painful but he is not having any pain to the scrotal area. He does take doxycycline daily. He has been on daily prednisone due to his COPD. He denies any new chest pain or shortness of breath. Patient denies fevers. Patient states he has chronic bilateral lower extremity swelling but has been worse on the left. Patient currently takes aspirin and is on Lovenox.       PCP: Amando Leong MD    Current Facility-Administered Medications   Medication Dose Route Frequency Provider Last Rate Last Admin    Vancomycin - Pharmacy to Dose  1 Each Other Rx Dosing/Monitoring Ange Leal PA        cefepime (MAXIPIME) 2 g in sterile water (preservative free) 10 mL IV syringe  2 g IntraVENous Q12H TRINA Bonner   2 g at 04/18/21 1752    vancomycin (VANCOCIN) 1750 mg in  ml infusion  1,750 mg IntraVENous Q18H Ange Leal PA        acetaminophen (TYLENOL) tablet 650 mg  650 mg Oral Q4H PRN Efraín Jordan MD        albuterol-ipratropium (DUO-NEB) 2.5 MG-0.5 MG/3 ML  3 mL Nebulization Q4H PRN Efraín Jordan MD        amLODIPine (NORVASC) tablet 5 mg  5 mg Oral DAILY Efraín Jordan MD        arformoteroL (BROVANA) neb solution 15 mcg  15 mcg Nebulization BID Agustin Jordan MD        budesonide (PULMICORT) 500 mcg/2 ml nebulizer suspension  500 mcg Nebulization BID Agustin Jordan MD        dilTIAZem IR (CARDIZEM) tablet 30 mg  30 mg Oral TIDAC Agustin Jordan MD        enoxaparin (LOVENOX) injection 40 mg  40 mg SubCUTAneous DAILY Agustin Jordan MD        furosemide (LASIX) tablet 20 mg  20 mg Oral DAILY Agustin Jordan MD        ferrous sulfate tablet 325 mg  325 mg Oral BID WITH MEALS Agustin Jordan MD        famotidine (PEPCID) tablet 20 mg  20 mg Oral DAILY Agustin Jordan MD        tamsulosin (FLOMAX) capsule 0.4 mg  0.4 mg Oral QPM Agustin Jordan MD        methylPREDNISolone (PF) (SOLU-MEDROL) injection 40 mg  40 mg IntraVENous Q12H Agustin Jordan MD   40 mg at 04/18/21 2347    sodium chloride (NS) flush 5-40 mL  5-40 mL IntraVENous Q8H Agustin Jordan MD   10 mL at 04/18/21 2347    sodium chloride (NS) flush 5-40 mL  5-40 mL IntraVENous PRN Agustin Jordan MD        acetaminophen (TYLENOL) tablet 650 mg  650 mg Oral Q6H PRN Agustin Jordan MD        Or   [facility] acetaminophen (TYLENOL) suppository 650 mg  650 mg Rectal Q6H PRN Agustin Jordan MD        polyethylene glycol (MIRALAX) packet 17 g  17 g Oral DAILY PRN Agustin Jordan MD        promethazine (PHENERGAN) tablet 12.5 mg  12.5 mg Oral Q6H PRN Agustin Jordan MD        Or    ondansetron (ZOFRAN) injection 4 mg  4 mg IntraVENous Q6H PRN Agustin Jordan MD           Past History     Past Medical History:  Past Medical History:   Diagnosis Date    Brain aneurysm     Cancer ([facility] [address]     prostate    COPD (chronic obstructive pulmonary disease) ([facility] [address]     Hypertension     Nocturia     Pneumonia     Prostate neoplasm     Radiation effect        Past Surgical History:  Past Surgical History:   Procedure Laterality Date    HX HERNIA REPAIR      HX HIP ARTHROSCOPY 04/09/2021       Family History:  Family History   Problem Relation Age of Onset    Heart Disease Mother        Social History:  Social History     Tobacco Use    Smoking status: Former Smoker     Types: Cigarettes    Smokeless tobacco: Never Used   Substance Use Topics    Alcohol use: No    Drug use: Never       Allergies: Allergies   Allergen Reactions    Aspirin Other (comments)     \"messes my stomach up\"    Bactrim [Sulfamethoxazole-Trimethoprim] Unknown (comments)       Review of Systems   Review of Systems   Constitutional: Negative for chills and fever. HENT: Positive for congestion. Respiratory: Negative for shortness of breath. Cardiovascular: Positive for leg swelling. Negative for chest pain. Gastrointestinal: Negative for anal bleeding, diarrhea, nausea and vomiting. Genitourinary: Positive for scrotal swelling. Musculoskeletal: Negative for back pain and neck pain. Skin: Positive for color change and wound. Neurological: Negative for weakness and numbness. All other systems reviewed and are negative. Physical Exam     Vitals:    04/18/21 1530 04/18/21 1800 04/18/21 1815 04/18/21 2144   BP:  (!) 123/53 (!) 129/49 (!) 128/54   Pulse:  83 84 77   Resp:  26 18 20   Temp:    98 °F (36.7 °C)   SpO2: 100% 100% 100% 100%   Weight:       Height:         Physical Exam  Vitals signs and nursing note reviewed. Constitutional:       Appearance: He is well-developed. Comments: Pt is alert sitting up on stretcher nasal cannula in place   HENT:      Head: Normocephalic and atraumatic. Neck:      Musculoskeletal: Normal range of motion and neck supple. Cardiovascular:      Rate and Rhythm: Normal rate and regular rhythm. Heart sounds: Normal heart sounds. No murmur. Pulmonary:      Effort: Pulmonary effort is normal. No respiratory distress. Breath sounds: Normal breath sounds. No wheezing or rales.    Abdominal:      General: Bowel sounds are normal. Palpations: Abdomen is soft. Tenderness: There is no abdominal tenderness. Genitourinary:     Comments: Moderate scrotal swelling and induration worse on the left, some skin peeling to the scrotum  Musculoskeletal:        Legs:       Comments: 2+ pitting edema to the bilateral lower extremities, erythema to the distal aspect of the left lower extremity, pulses 2+ for DP unable to palpate PT secondary to edema   Neurological:      Mental Status: He is alert and oriented to person, place, and time. Psychiatric:         Judgment: Judgment normal.           Diagnostic Study Results     Labs:     Recent Results (from the past 12 hour(s))   EKG, 12 LEAD, INITIAL    Collection Time: 04/18/21  3:05 PM   Result Value Ref Range    Ventricular Rate 96 BPM    Atrial Rate 96 BPM    P-R Interval 164 ms    QRS Duration 84 ms    Q-T Interval 354 ms    QTC Calculation (Bezet) 447 ms    Calculated P Axis 56 degrees    Calculated R Axis 16 degrees    Calculated T Axis 49 degrees    Diagnosis       Normal sinus rhythm  Normal ECG  When compared with ECG of 05-APR-2021 13:22,  No significant change was found     CBC WITH AUTOMATED DIFF    Collection Time: 04/18/21  3:17 PM   Result Value Ref Range    WBC 19.7 (H) 4.6 - 13.2 K/uL    RBC 2.60 (L) 4.35 - 5.65 M/uL    HGB 7.8 (L) 13.0 - 16.0 g/dL    HCT 24.2 (L) 36.0 - 48.0 %    MCV 93.1 74.0 - 97.0 FL    MCH 30.0 24.0 - 34.0 PG    MCHC 32.2 31.0 - 37.0 g/dL    RDW 15.1 (H) 11.6 - 14.5 %    PLATELET 246 414 - 823 K/uL    MPV 9.6 9.2 - 11.8 FL    NEUTROPHILS 84 (H) 40 - 73 %    LYMPHOCYTES 5 (L) 21 - 52 %    MONOCYTES 6 3 - 10 %    EOSINOPHILS 0 0 - 5 %    BASOPHILS 0 0 - 2 %    ABS. NEUTROPHILS 16.7 (H) 1.8 - 8.0 K/UL    ABS. LYMPHOCYTES 1.1 0.9 - 3.6 K/UL    ABS. MONOCYTES 1.1 0.05 - 1.2 K/UL    ABS. EOSINOPHILS 0.1 0.0 - 0.4 K/UL    ABS.  BASOPHILS 0.0 0.0 - 0.1 K/UL    DF AUTOMATED     METABOLIC PANEL, COMPREHENSIVE    Collection Time: 04/18/21  3:17 PM   Result Value Ref Range Sodium 136 136 - 145 mmol/L    Potassium 3.7 3.5 - 5.5 mmol/L    Chloride 98 (L) 100 - 111 mmol/L    CO2 29 21 - 32 mmol/L    Anion gap 9 3.0 - 18 mmol/L    Glucose 125 (H) 74 - 99 mg/dL    BUN 43 (H) 7.0 - 18 MG/DL    Creatinine 1.35 (H) 0.6 - 1.3 MG/DL    BUN/Creatinine ratio 32 (H) 12 - 20      GFR est AA >60 >60 ml/min/1.73m2    GFR est non-AA 51 (L) >60 ml/min/1.73m2    Calcium 6.4 (L) 8.5 - 10.1 MG/DL    Bilirubin, total 0.5 0.2 - 1.0 MG/DL    ALT (SGPT) 33 16 - 61 U/L    AST (SGOT) 25 10 - 38 U/L    Alk. phosphatase 78 45 - 117 U/L    Protein, total 5.4 (L) 6.4 - 8.2 g/dL    Albumin 2.4 (L) 3.4 - 5.0 g/dL    Globulin 3.0 2.0 - 4.0 g/dL    A-G Ratio 0.8 0.8 - 1.7     PROTHROMBIN TIME + INR    Collection Time: 04/18/21  3:17 PM   Result Value Ref Range    Prothrombin time 13.7 11.5 - 15.2 sec    INR 1.1 0.8 - 1.2     PTT    Collection Time: 04/18/21  3:17 PM   Result Value Ref Range    aPTT 32.4 23.0 - 36.4 SEC   NT-PRO BNP    Collection Time: 04/18/21  3:17 PM   Result Value Ref Range    NT pro-BNP 73 0 - 1,800 PG/ML   POC LACTIC ACID    Collection Time: 04/18/21  4:33 PM   Result Value Ref Range    Lactic Acid (POC) 1.40 0.40 - 2.00 mmol/L   SED RATE (ESR)    Collection Time: 04/18/21  8:25 PM   Result Value Ref Range    Sed rate, automated 134 (H) 0 - 20 mm/hr       Radiologic Studies:   XR CHEST PORT   Final Result   Interstitial infiltrates of both lung bases right greater than left   base and the possibility of interstitial pneumonia. CT ABD PELV W CONT   Final Result      No evidence of Susana's gangrene. Extensive stranding of the subcutaneous   tissues of the hips and thighs bilaterally superficial fluid suggesting   cellulitis and/or edema. Correlate with clinical findings to exclude compartment   syndrome. There is a left hip arthroplasty. There is high density focus seen anterior to   the left femur at the level of the arthroplasty suggesting a postoperative   hematoma.  Hematoma is more prominent than on prior exam. Small dot of gas is   seen adjacent to the left hip joint which may be postoperative, however If there   is any clinical concern for septic arthritis then I would advise aspiration the   hip joint. Loculated pleural fluid collection in the right hemithorax with calcification   the parietal and visceral pleura unchanged. Right adrenal adenoma. Bilateral fairly large hydroceles. CT Results  (Last 48 hours)               04/18/21 1714  CT ABD PELV W CONT Final result    Impression:      No evidence of Susana's gangrene. Extensive stranding of the subcutaneous   tissues of the hips and thighs bilaterally superficial fluid suggesting   cellulitis and/or edema. Correlate with clinical findings to exclude compartment   syndrome. There is a left hip arthroplasty. There is high density focus seen anterior to   the left femur at the level of the arthroplasty suggesting a postoperative   hematoma. Hematoma is more prominent than on prior exam. Small dot of gas is   seen adjacent to the left hip joint which may be postoperative, however If there   is any clinical concern for septic arthritis then I would advise aspiration the   hip joint. Loculated pleural fluid collection in the right hemithorax with calcification   the parietal and visceral pleura unchanged. Right adrenal adenoma. Bilateral fairly large hydroceles. Narrative:  EXAM: CT of the abdomen and pelvis       INDICATION: Infection of hip, evaluate for scrotal infection       COMPARISON: April 11, 2021       TECHNIQUE: Axial CT imaging of the abdomen and pelvis was performed with   intravenous contrast. Multiplanar reformats were generated. One or more dose   reduction techniques were used on this CT: automated exposure control,   adjustment of the mAs and/or kVp according to patient size, and iterative   reconstruction techniques.   The specific techniques used on this CT exam have been documented in the patient's electronic medical record. Digital Imaging and   Communications in Medicine (DICOM) format image data are available to   nonaffiliated external healthcare facilities or entities on a secure, media   free, reciprocally searchable basis with patient authorization for at least a   12-month period after this study. _______________       FINDINGS:       LOWER CHEST: There is loculated pleural fluid with calcification of the parietal   and visceral pleura. Adjacent parenchymal scarring seen. LIVER, BILIARY: Liver is normal. No biliary dilation. Gallbladder is   unremarkable. PANCREAS: Normal.       SPLEEN: Normal.       ADRENALS: Left adrenal is unremarkable. There is a right adrenal nodule   measuring 14 mm suggesting a myelolipoma. Faint areas of fat density are seen in   this nodule. KIDNEYS: Right kidney: No suspicious appearing renal lesion seen. Right kidney   demonstrates no hydronephrosis or nephrolithiasis. No ureteral obstruction. Left kidney: Unremarkable without ureteral obstruction. VASCULATURE: Mild to moderate calcific atherosclerosis present. LYMPH NODES: No enlarged lymph nodes. GASTROINTESTINAL TRACT: No bowel dilation or wall thickening. There is colonic   diverticulosis without diverticulitis. Moderate to large amount of retained   stool is present in the colon. Appendix is normal.       PELVIC ORGANS: Evaluation the pelvis is limited by the beam hardening artifacts   from left hip arthroplasty. Patulous left internal canal with fat content seen. There is no evidence of gas to suggest Susana's gangrene. There is extensive superficial stranding/edema of the subcutaneous tissues of   both hips and proximal thigh, suggesting cellulitis. No soft tissue gas is seen   in the thigh. Correlate with clinical findings to exclude any compartment   syndrome. Bilateral hydroceles seen.        BONES: There is a left hip arthroplasty. There is no loosening of the hardware. There is a lobulated high density area measuring 8.7 x 6.5 cm anterior to the   left proximal femur suggesting postoperative hematoma. Another subcutaneous high   density focus seen measuring 3.6 x 2.3 cm left thigh anteriorly again suggesting   hematoma. Small dot of gas is seen superior to the left hip arthroplasty on   axial image 117. The degree of gas is decreased from prior exam is likely   postsurgical. If there is any clinical concern for septic arthritis then I would   advise aspiration of the joint. There is severe osteopenia. OTHER: None.       _______________               CXR Results  (Last 48 hours)               04/18/21 6449  XR CHEST PORT Final result    Impression:  Interstitial infiltrates of both lung bases right greater than left   base and the possibility of interstitial pneumonia. Narrative:  EXAM:  AP Portable Chest X-ray 1 view        INDICATION: Shortness of breath       COMPARISON: April 10, 2021       _______________       FINDINGS:  Heart and mediastinal contours are within normal limits for portable   radiograph. There are interstitial infiltrates of both lung bases raising the   possibility of interstitial pneumonia. . There is a teardrop shaped rim calcified   pleural density as seen on CT. No pleural effusion seen otherwise. No acute   osseous findings. ________________                     Medical Decision Making   I am the first provider for this patient. I reviewed the vital signs, available nursing notes, past medical history, past surgical history, family history and social history. Vital Signs: Reviewed the patient's vital signs.     Pulse Oximetry Analysis: 100% on 2 L nasal cannula    Cardiac Monitor:  Rate: 90 bpm  Rhythm: NSR    EKG interpretation: (Preliminary)  4:08 PM   Normal sinus rhythm rate 96 bpm no STEMI  EKG read by Cedric Siemens, MD     Records Reviewed: Nursing Notes, Old Medical Records and Previous electrocardiograms    Procedures:  Procedures    ED Course:   4:08 PM Initial assessment performed. The patients presenting problems have been discussed, and they are in agreement with the care plan formulated and outlined with them. I have encouraged them to ask questions as they arise throughout their visit. Case discussed with Dr. Cruzito Hagan, will consult. Will see patient tomorrow in the morning to determine if he requires aspiration of the hip    Core Measures:  For Hospitalized Patients:    1. Hospitalization Decision Time:  The decision to hospitalize the patient was made by Yahir Silva PA-C at 8:37 PM on 4/18/2021    2. Aspirin: Aspirin was not given because the patient did not present with a stroke at the time of their Emergency Department evaluation    8:37 PM  Patient is being admitted to the hospital by Dr. Myke Mai . The results of their tests and reasons for their admission have been discussed with them and/or available family. They convey agreement and understanding for the need to be admitted and for their admission diagnosis. CONDITIONS ON ADMISSION:  Sepsis is not present at the time of admission. Deep Vein Thrombosis is not present at the time of admission. Thrombosis is not present at the time of admission. Urinary Tract Infection is not present at the time of admission. .  Pressure Ulcer is not present at the time of admission. CLINICAL IMPRESSION:    1. Cellulitis, unspecified cellulitis site    2. Postoperative hematoma of musculoskeletal structure following musculoskeletal procedure    3. Hypocalcemia    4. interstitial infiltrates     Discussion:  Pt presents with right hip pain and scrotal swelling following hip surgery 9 days ago. He was found to have elevated white blood cell count though he does take prednisone chronically for his COPD.   He is afebrile and lactic is normal.  CT of the abdomen pelvis including scrotum shows cellulitis to the scrotal sac and hematoma to the hip. Unclear if findings represent septic joint. Case was discussed with Ortho who will see the patient tomorrow and determine if aspiration is indicated. PVL of the leg was negative for DVT. He was started on vancomycin and cefepime. Chest x-ray could show findings representing a pneumonia. Patient will be admitted to the hospital by hospitalist    Diagnosis and Disposition       CLINICAL IMPRESSION:    1. Cellulitis, unspecified cellulitis site    2. Postoperative hematoma of musculoskeletal structure following musculoskeletal procedure    3. Hypocalcemia        PLAN:  1. admit         Please note that this dictation was completed with [employer], the computer voice recognition software. Quite often unanticipated grammatical, syntax, homophones, and other interpretive errors are inadvertently transcribed by the computer software. Please disregard these errors. Please excuse any errors that have escaped final proofreading.

## 2021-04-18 NOTE — ED TRIAGE NOTES
Patient arrived via EMS from [facility] for possible post op complication of the left hip that was done on 4/9/21 and swollen scrotum. Pt states he is non ambulatory and unable to bear any weight and that is his baseline even prior to left hip sx.

## 2021-04-19 PROBLEM — S70.02XA HEMATOMA OF LEFT HIP: Status: ACTIVE | Noted: 2021-04-19

## 2021-04-19 LAB
ALBUMIN SERPL-MCNC: 2.4 G/DL (ref 3.4–5)
ALBUMIN/GLOB SERPL: 1 {RATIO} (ref 0.8–1.7)
ALP SERPL-CCNC: 72 U/L (ref 45–117)
ALT SERPL-CCNC: 27 U/L (ref 16–61)
ANION GAP SERPL CALC-SCNC: 6 MMOL/L (ref 3–18)
AST SERPL-CCNC: 21 U/L (ref 10–38)
ATRIAL RATE: 96 BPM
BASOPHILS # BLD: 0 K/UL (ref 0–0.1)
BASOPHILS NFR BLD: 0 % (ref 0–2)
BILIRUB SERPL-MCNC: 0.7 MG/DL (ref 0.2–1)
BUN SERPL-MCNC: 36 MG/DL (ref 7–18)
BUN/CREAT SERPL: 31 (ref 12–20)
CALCIUM SERPL-MCNC: 6.7 MG/DL (ref 8.5–10.1)
CALCULATED P AXIS, ECG09: 56 DEGREES
CALCULATED R AXIS, ECG10: 16 DEGREES
CALCULATED T AXIS, ECG11: 49 DEGREES
CHLORIDE SERPL-SCNC: 100 MMOL/L (ref 100–111)
CO2 SERPL-SCNC: 30 MMOL/L (ref 21–32)
CREAT SERPL-MCNC: 1.18 MG/DL (ref 0.6–1.3)
CRP SERPL-MCNC: 10.6 MG/DL (ref 0–0.3)
DIAGNOSIS, 93000: NORMAL
DIFFERENTIAL METHOD BLD: ABNORMAL
EOSINOPHIL # BLD: 0.2 K/UL (ref 0–0.4)
EOSINOPHIL NFR BLD: 1 % (ref 0–5)
ERYTHROCYTE [DISTWIDTH] IN BLOOD BY AUTOMATED COUNT: 15.2 % (ref 11.6–14.5)
GLOBULIN SER CALC-MCNC: 2.5 G/DL (ref 2–4)
GLUCOSE SERPL-MCNC: 97 MG/DL (ref 74–99)
HCT VFR BLD AUTO: 20.7 % (ref 36–48)
HGB BLD-MCNC: 6.7 G/DL (ref 13–16)
HISTORY CHECKED?,CKHIST: NORMAL
LYMPHOCYTES # BLD: 0.3 K/UL (ref 0.9–3.6)
LYMPHOCYTES NFR BLD: 2 % (ref 21–52)
MAGNESIUM SERPL-MCNC: 2 MG/DL (ref 1.6–2.6)
MCH RBC QN AUTO: 29.9 PG (ref 24–34)
MCHC RBC AUTO-ENTMCNC: 32.4 G/DL (ref 31–37)
MCV RBC AUTO: 92.4 FL (ref 74–97)
MONOCYTES # BLD: 0.3 K/UL (ref 0.05–1.2)
MONOCYTES NFR BLD: 2 % (ref 3–10)
NEUTS SEG # BLD: 12 K/UL (ref 1.8–8)
NEUTS SEG NFR BLD: 91 % (ref 40–73)
P-R INTERVAL, ECG05: 164 MS
PLATELET # BLD AUTO: 239 K/UL (ref 135–420)
PMV BLD AUTO: 9.6 FL (ref 9.2–11.8)
POTASSIUM SERPL-SCNC: 3.7 MMOL/L (ref 3.5–5.5)
PROT SERPL-MCNC: 4.9 G/DL (ref 6.4–8.2)
Q-T INTERVAL, ECG07: 354 MS
QRS DURATION, ECG06: 84 MS
QTC CALCULATION (BEZET), ECG08: 447 MS
RBC # BLD AUTO: 2.24 M/UL (ref 4.35–5.65)
SODIUM SERPL-SCNC: 136 MMOL/L (ref 136–145)
VENTRICULAR RATE, ECG03: 96 BPM
WBC # BLD AUTO: 13.2 K/UL (ref 4.6–13.2)

## 2021-04-19 PROCEDURE — P9016 RBC LEUKOCYTES REDUCED: HCPCS

## 2021-04-19 PROCEDURE — 85025 COMPLETE CBC W/AUTO DIFF WBC: CPT

## 2021-04-19 PROCEDURE — P9047 ALBUMIN (HUMAN), 25%, 50ML: HCPCS | Performed by: INTERNAL MEDICINE

## 2021-04-19 PROCEDURE — 94640 AIRWAY INHALATION TREATMENT: CPT

## 2021-04-19 PROCEDURE — 77010033678 HC OXYGEN DAILY

## 2021-04-19 PROCEDURE — 86923 COMPATIBILITY TEST ELECTRIC: CPT

## 2021-04-19 PROCEDURE — 97116 GAIT TRAINING THERAPY: CPT

## 2021-04-19 PROCEDURE — 74011000250 HC RX REV CODE- 250: Performed by: INTERNAL MEDICINE

## 2021-04-19 PROCEDURE — 30233N1 TRANSFUSION OF NONAUTOLOGOUS RED BLOOD CELLS INTO PERIPHERAL VEIN, PERCUTANEOUS APPROACH: ICD-10-PCS | Performed by: INTERNAL MEDICINE

## 2021-04-19 PROCEDURE — 74011250637 HC RX REV CODE- 250/637: Performed by: INTERNAL MEDICINE

## 2021-04-19 PROCEDURE — 97530 THERAPEUTIC ACTIVITIES: CPT

## 2021-04-19 PROCEDURE — 74011250636 HC RX REV CODE- 250/636: Performed by: INTERNAL MEDICINE

## 2021-04-19 PROCEDURE — 36430 TRANSFUSION BLD/BLD COMPNT: CPT

## 2021-04-19 PROCEDURE — 74011250636 HC RX REV CODE- 250/636: Performed by: PHYSICIAN ASSISTANT

## 2021-04-19 PROCEDURE — 36415 COLL VENOUS BLD VENIPUNCTURE: CPT

## 2021-04-19 PROCEDURE — 74011000250 HC RX REV CODE- 250: Performed by: PHYSICIAN ASSISTANT

## 2021-04-19 PROCEDURE — 65270000029 HC RM PRIVATE

## 2021-04-19 PROCEDURE — 80053 COMPREHEN METABOLIC PANEL: CPT

## 2021-04-19 PROCEDURE — 77030027138 HC INCENT SPIROMETER -A

## 2021-04-19 PROCEDURE — 86901 BLOOD TYPING SEROLOGIC RH(D): CPT

## 2021-04-19 PROCEDURE — 97162 PT EVAL MOD COMPLEX 30 MIN: CPT

## 2021-04-19 PROCEDURE — 83735 ASSAY OF MAGNESIUM: CPT

## 2021-04-19 RX ORDER — ARFORMOTEROL TARTRATE 15 UG/2ML
15 SOLUTION RESPIRATORY (INHALATION)
Status: DISCONTINUED | OUTPATIENT
Start: 2021-04-19 | End: 2021-04-23 | Stop reason: HOSPADM

## 2021-04-19 RX ORDER — ALBUMIN HUMAN 250 G/1000ML
12.5 SOLUTION INTRAVENOUS ONCE
Status: COMPLETED | OUTPATIENT
Start: 2021-04-19 | End: 2021-04-19

## 2021-04-19 RX ORDER — BUDESONIDE 0.5 MG/2ML
500 INHALANT ORAL
Status: DISCONTINUED | OUTPATIENT
Start: 2021-04-19 | End: 2021-04-23 | Stop reason: HOSPADM

## 2021-04-19 RX ORDER — FUROSEMIDE 10 MG/ML
40 INJECTION INTRAMUSCULAR; INTRAVENOUS EVERY 12 HOURS
Status: DISCONTINUED | OUTPATIENT
Start: 2021-04-19 | End: 2021-04-23 | Stop reason: HOSPADM

## 2021-04-19 RX ORDER — SODIUM CHLORIDE 9 MG/ML
250 INJECTION, SOLUTION INTRAVENOUS AS NEEDED
Status: DISCONTINUED | OUTPATIENT
Start: 2021-04-19 | End: 2021-04-23 | Stop reason: HOSPADM

## 2021-04-19 RX ADMIN — FAMOTIDINE 20 MG: 20 TABLET, FILM COATED ORAL at 10:35

## 2021-04-19 RX ADMIN — ARFORMOTEROL TARTRATE 15 MCG: 15 SOLUTION RESPIRATORY (INHALATION) at 07:12

## 2021-04-19 RX ADMIN — VANCOMYCIN HYDROCHLORIDE 1750 MG: 10 INJECTION, POWDER, LYOPHILIZED, FOR SOLUTION INTRAVENOUS at 15:00

## 2021-04-19 RX ADMIN — ENOXAPARIN SODIUM 40 MG: 40 INJECTION SUBCUTANEOUS at 10:35

## 2021-04-19 RX ADMIN — IPRATROPIUM BROMIDE AND ALBUTEROL SULFATE 3 ML: .5; 3 SOLUTION RESPIRATORY (INHALATION) at 15:16

## 2021-04-19 RX ADMIN — METHYLPREDNISOLONE SODIUM SUCCINATE 40 MG: 40 INJECTION, POWDER, FOR SOLUTION INTRAMUSCULAR; INTRAVENOUS at 10:35

## 2021-04-19 RX ADMIN — FERROUS SULFATE TAB 325 MG (65 MG ELEMENTAL FE) 325 MG: 325 (65 FE) TAB at 18:46

## 2021-04-19 RX ADMIN — Medication 10 ML: at 15:10

## 2021-04-19 RX ADMIN — FERROUS SULFATE TAB 325 MG (65 MG ELEMENTAL FE) 325 MG: 325 (65 FE) TAB at 08:15

## 2021-04-19 RX ADMIN — DILTIAZEM HYDROCHLORIDE 30 MG: 30 TABLET, FILM COATED ORAL at 18:46

## 2021-04-19 RX ADMIN — IPRATROPIUM BROMIDE AND ALBUTEROL SULFATE 3 ML: .5; 3 SOLUTION RESPIRATORY (INHALATION) at 11:58

## 2021-04-19 RX ADMIN — DILTIAZEM HYDROCHLORIDE 30 MG: 30 TABLET, FILM COATED ORAL at 11:58

## 2021-04-19 RX ADMIN — ALBUMIN (HUMAN) 12.5 G: 0.25 INJECTION, SOLUTION INTRAVENOUS at 01:40

## 2021-04-19 RX ADMIN — Medication 10 ML: at 21:44

## 2021-04-19 RX ADMIN — BUDESONIDE 500 MCG: 0.5 INHALANT RESPIRATORY (INHALATION) at 07:12

## 2021-04-19 RX ADMIN — CEFEPIME HYDROCHLORIDE 2 G: 2 INJECTION, POWDER, FOR SOLUTION INTRAVENOUS at 18:46

## 2021-04-19 RX ADMIN — CEFEPIME HYDROCHLORIDE 2 G: 2 INJECTION, POWDER, FOR SOLUTION INTRAVENOUS at 05:40

## 2021-04-19 RX ADMIN — FUROSEMIDE 40 MG: 10 INJECTION INTRAMUSCULAR; INTRAVENOUS at 14:22

## 2021-04-19 RX ADMIN — DILTIAZEM HYDROCHLORIDE 30 MG: 30 TABLET, FILM COATED ORAL at 08:15

## 2021-04-19 RX ADMIN — FUROSEMIDE 40 MG: 10 INJECTION INTRAMUSCULAR; INTRAVENOUS at 01:40

## 2021-04-19 RX ADMIN — ARFORMOTEROL TARTRATE 15 MCG: 15 SOLUTION RESPIRATORY (INHALATION) at 19:43

## 2021-04-19 RX ADMIN — TAMSULOSIN HYDROCHLORIDE 0.4 MG: 0.4 CAPSULE ORAL at 18:47

## 2021-04-19 RX ADMIN — METHYLPREDNISOLONE SODIUM SUCCINATE 40 MG: 40 INJECTION, POWDER, FOR SOLUTION INTRAMUSCULAR; INTRAVENOUS at 21:44

## 2021-04-19 RX ADMIN — BUDESONIDE 500 MCG: 0.5 INHALANT RESPIRATORY (INHALATION) at 19:43

## 2021-04-19 RX ADMIN — Medication 10 ML: at 05:40

## 2021-04-19 RX ADMIN — IPRATROPIUM BROMIDE AND ALBUTEROL SULFATE 3 ML: .5; 3 SOLUTION RESPIRATORY (INHALATION) at 07:15

## 2021-04-19 RX ADMIN — AMLODIPINE BESYLATE 5 MG: 5 TABLET ORAL at 10:35

## 2021-04-19 NOTE — PROGRESS NOTES
Reason for Admission:   Post OP Complication                 RUR Score:     High; 37%      PCP: First and Last name:  Ellie Barraza MD     Name of Practice:   Are you a current patient: Yes/No:   Approximate date of last visit:    Can you do a virtual visit with your PCP:              Resources/supports as identified by patient/family:                   Top Challenges facing patient (as identified by patient/family and CM): Finances/Medication cost?                    Transportation? Support system or lack thereof? Living arrangements? Self-care/ADLs/Cognition? Current Advanced Directive/Advance Care Plan:  Partial Code      Healthcare Decision Maker:   Click here to complete HealthCare Decision Makers including selection of the Healthcare Decision Maker Relationship (ie \"Primary\")      Primary Decision MakerDpraveena Graham  [phone]    Secondary Decision Maker: Fran Marin - [phone]    Payor Source Payor: Zuleyka Leo / Plan: [place]ARE [place] / Product Type: Medicare /                             Plan for utilizing home health:    Unlikely                  Transition of Care Plan:     SNF             Chart reviewed. Per H&P \"Dre Haji is a 66 y.o.  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 Patient had an extensive hospital stay and was discharged 4 days ago please see discharge summary and last H&P for further details. Patient was discharged from ICU several days ago had developed hypotension respiratory failure due to narcotics and developed a large hip hematoma.   His hemoglobin had been stable and he did not require multiple transfusions he was discharged on Lovenox to rehab. However there is been oozing through the left hip area worsening leukocytosis. In the emergency room the lateral skin area was cultured patient had exquisite pain in the scrotal area with edema his orthopedic surgeon was contacted who will evaluate in the morning for possible hip aspiration. \"    Noted pt was recently discharged and was transferred to [place] [address] (4/14/21). CM contacted [facility] and they have confirmed pt was transferred from their facility and they are able to accept pt back when medically stable. CM attempted to contact pt's wife, Shanna Andrews ([phone]), and left a message requesting a return call. CM to continue to follow and   assist with transition of care. 1500:  CM spoke with pt's wife to discuss transition of care. Pt/family would prefer to not return to [place] [address] and would like to have clinical information sent to [address] [place] [address] to see if they are able to accept this pt.   CMS has been notified to assist.  CM to continue to follow and assist.      Care Management Interventions  Mode of Transport at Discharge: S  Transition of Care Consult (CM Consult): Discharge Planning, SNF  Health Maintenance Reviewed: Yes  Physical Therapy Consult: Yes  Current Support Network: Lives with Spouse  Confirm Follow Up Transport: Family  The Plan for Transition of Care is Related to the Following Treatment Goals : SNF  The Patient and/or Patient Representative was Provided with a Choice of Provider and Agrees with the Discharge Plan?: Yes  Name of the Patient Representative Who was Provided with a Choice of Provider and Agrees with the Discharge Plan: spouse  Freedom of Choice List was Provided with Basic Dialogue that Supports the Patient's Individualized Plan of Care/Goals, Treatment Preferences and Shares the Quality Data Associated with the Providers?: Yes  Discharge Location  Discharge Placement: Skilled nursing facility      Readmission Assessment  Number of days since last admission?: 1-7 days  Previous disposition: SNF  What was the patient's/caregiver's perception as to why they think they needed to return back to the hospital?: Other (Comment)(post op complication)  Did you visit your Primary Care Physician after you left the hospital, before you returned this time?: No  Why weren't you able to visit your PCP?: Other (Comment)(discharged to SNF)  Did you see a specialist, such as Cardiac, Pulmonary, Orthopedic Physician, etc. after you left the hospital?: No  Who advised the patient to return to the hospital?: Skilled Unit  Does the patient report anything that got in the way of taking their medications?: No

## 2021-04-19 NOTE — PROGRESS NOTES
Problem: Mobility Impaired (Adult and Pediatric)  Goal: *Acute Goals and Plan of Care (Insert Text)  Description: Physical Therapy Goals   Initiated 4/19/2021 and to be accomplished within 7 day(s)  1. Patient will move from supine <> sit with SBA/S in prep for out of bed activity and change of position. 2.  Patient will perform sit<> stand with SBA/RW WBAT L LE in prep for transfers/ambulation. 3.  Patient will transfer from bed <> chair with SBA/RW/WBAT LLE for time up in chair  as tolerated for completion of ADL activity. 4.  Patient will ambulate  30 feet with CGA/RW/WBAT L LE for improved functional mobility at discharge. Outcome: Progressing Towards Goal     []  Patient has met MD mobilization kelli for d/c home   []  Recommend HH with 24 hour adult care   [x]  Benefit from additional acute PT session to address:  deficits in functional mobility, strength and ROM LE's    PHYSICAL THERAPY EVALUATION    Patient: Polly Meyer (10 y.o. male)  Date: 4/19/2021  Primary Diagnosis: Cellulitis [L03.90]  Acute hip pain [M25.559]  Precautions:   Fall, WBAT  WBAT  PLOF: Pt admitted from [place] [address] and reports not ambulating yet while there due to pain L hip. Pt s/p L Hip anterior LESLIE at [facility] 4/09/2021. Having difficulty ambulating prior to surgery. ASSESSMENT :  Based on the objective data described below, the patient is seen on medical unit. Pt presents with decr'd ROM/motor performance LLE, decrease independence in overall functional mobility with regard to bed mobility, transfers, gait, and balance. Pt found supine in bed; reports L hip pain 5/10 pre, 5/10 post session. Note pt is very [place] and only has L hearing aide in place currently. Nurses in to during PT session for skin inspection. Pt with large purple and red hematoma/bruise posterior and lateral L buttocks and hip. Dressing ant hip incision in place. Pt demonstrates supine >sit CGA. Sitting balance intact EOB.   Pt STS with RW/min A of 2 and able to take several small side steps to R toward [facility] with min A of 2. Lisbet slow, antalgic with decr'd foot clearance and decr's stance time L LE. Pt educated in PT POC, advised to call for assist when ready to return back to supine and pt expressed understanding. Pt left seated EOB with all needs in reach and nurse [facility] aware. O2 sat 100% on 2Lnc during session. Recommend pt return to SNF to resume rehab  upon discharge to reach maximal level of independence/safety with functional mobility. Patient education: Mobility safety, WB, ice application/use, elevation, environmental safety. Patient will benefit from skilled intervention to address the above impairments. Patient's rehabilitation potential is considered to be Fair  Factors which may influence rehabilitation potential include:   []         None noted  []         Mental ability/status  [x]         Medical condition  []         Home/family situation and support systems  []         Safety awareness  [x]         Pain tolerance/management  []         Other:      PLAN :  Recommendations and Planned Interventions:   [x]           Bed Mobility Training             []    Neuromuscular Re-Education  [x]           Transfer Training                   []    Orthotic/Prosthetic Training  [x]           Gait Training                          []    Modalities  [x]           Therapeutic Exercises           []    Edema Management/Control  [x]           Therapeutic Activities            []    Family Training/Education  [x]           Patient Education  []           Other (comment):    Frequency/Duration: Patient will be followed by physical therapy twice daily as tolerated to address goals. Discharge Recommendations: Shree Wiley  Further Equipment Recommendations for Discharge: N/A     SUBJECTIVE:   Patient stated I'm not too bad, not too good, just hanging in there.     OBJECTIVE DATA SUMMARY:     Past Medical History:   Diagnosis Date Brain aneurysm     Cancer [facility])     prostate    COPD (chronic obstructive pulmonary disease) ([facility] [address]     Hypertension     Nocturia     Pneumonia     Prostate neoplasm     Radiation effect      Past Surgical History:   Procedure Laterality Date    HX HERNIA REPAIR      HX HIP ARTHROSCOPY  04/09/2021     Barriers to Learning/Limitations: yes;  sensory deficits-vision/hearing/speech and physical  Compensate with: Visual Cues, Verbal Cues, and Tactile Cues  Home Situation:  Home Situation  Home Environment: Trailer/mobile home  # Steps to Enter: 6  Rails to Enter: Yes  Hand Rails : Bilateral  Wheelchair Ramp: No  One/Two Story Residence: One story  Living Alone: No  Support Systems: Spouse/Significant Other/Partner  Patient Expects to be Discharged to[de-identified] Skilled nursing facility  Current DME Used/Available at Home: Walker, rolling, Cane, quad  Tub or Shower Type: Tub/Shower combination(but pt does sponge baths)  Critical Behavior:  Neurologic State: Alert; Appropriate for age  Orientation Level: Oriented X4  Cognition: Follows commands; Appropriate safety awareness; Appropriate for age attention/concentration; Appropriate decision making  Psychosocial  Purposeful Interaction: Yes  Skin Integrity: Incision (comment)(ant left hip with dressing in place)  Skin Integumentary  Skin Color: Red;Purple(large hematoma/bruise L posterior/lateral hip)  Skin Integrity: Incision (comment)(ant left hip with dressing in place)  Strength:    Strength: Generally decreased, functional  Tone & Sensation:   Tone: Normal  Sensation: Intact  Range Of Motion:  AROM: Generally decreased, functional  PROM: Generally decreased, functional  Functional Mobility:  Bed Mobility:  Supine to Sit: Contact guard assistance  Scooting: Contact guard assistance  Transfers:  Sit to Stand: Minimum assistance;Assist x2(bed elevated)  Stand to Sit: Minimum assistance;Assist x2(bed elevated)  Balance:   Sitting: Intact  Standing: Impaired; With support  Standing - Static: Fair;Constant support  Standing - Dynamic : Fair;Constant support  Ambulation/Gait Training:  Distance (ft): 2 Feet (ft)(side steps )  Assistive Device: Gait belt;Walker, rolling  Ambulation - Level of Assistance: Minimal assistance;Assist x2  Gait Description (WDL): Exceptions to WDL  Gait Abnormalities: Antalgic;Decreased step clearance; Step to gait  Left Side Weight Bearing: As tolerated  Stance: Left decreased  Speed/Lisbet: Slow  Step Length: Right shortened;Left shortened  Swing Pattern: Right asymmetrical;Left asymmetrical  Interventions: Safety awareness training; Tactile cues; Verbal cues; Visual/Demos  Pain:  Pain level pre-treatment: 5/10   Pain level post-treatment: 5/10   Pain Intervention(s) : Medication (see MAR); Rest, Ice, Repositioning  Response to intervention: Nurse notified, See doc flow  Activity Tolerance:   Fair   Please refer to the flowsheet for vital signs taken during this treatment. After treatment:   [x]         Patient left in no apparent distress sitting up EOB  []         Patient left in no apparent distress in bed  [x]         Call bell left within reach  [x]         Nursing notified  []         Caregiver present  []         Bed alarm activated  []         SCDs applied    COMMUNICATION/EDUCATION:   [x]         Role of Physical Therapy in the acute care setting. [x]         Fall prevention education was provided and the patient/caregiver indicated understanding. [x]         Patient/family have participated as able in goal setting and plan of care. [x]         Patient/family agree to work toward stated goals and plan of care. []         Patient understands intent and goals of therapy, but is neutral about his/her participation. []         Patient is unable to participate in goal setting/plan of care: ongoing with therapy staff.  []         Other:     Thank you for this referral.  Debbie Kamara, PT   Time Calculation: 31 mins      Eval Complexity: History: HIGH Complexity :3+ comorbidities / personal factors will impact the outcome/ POC Exam:MEDIUM Complexity : 3 Standardized tests and measures addressing body structure, function, activity limitation and / or participation in recreation  Presentation: MEDIUM Complexity : Evolving with changing characteristics  Clinical Decision Making:Medium Complexity    Overall Complexity:MEDIUM

## 2021-04-19 NOTE — PROGRESS NOTES
Problem: Mobility Impaired (Adult and Pediatric)  Goal: *Acute Goals and Plan of Care (Insert Text)  Description: Physical Therapy Goals   Initiated 4/19/2021 and to be accomplished within 7 day(s)  1. Patient will move from supine <> sit with SBA/S in prep for out of bed activity and change of position. 2.  Patient will perform sit<> stand with SBA/RW WBAT L LE in prep for transfers/ambulation. 3.  Patient will transfer from bed <> chair with SBA/RW/WBAT LLE for time up in chair  as tolerated for completion of ADL activity. 4.  Patient will ambulate  30 feet with CGA/RW/WBAT L LE for improved functional mobility at discharge. Outcome: Progressing Towards Goal  []  Patient has met MD livier pereira for d/c home   []  Recommend HH with 24 hour adult care   [x]  Benefit from additional acute PT session to address:      PHYSICAL THERAPY TREATMENT    Patient: Sergio Handley (65 y.o. male)  Date: 4/19/2021  Diagnosis: Cellulitis [L03.90]  Acute hip pain [M25.559] Cellulitis  Precautions: Fall, WBAT  PLOF: reports not amb'g yet with PT at [facility]    ASSESSMENT:  Pt found seated EOB and requesting to use bathroom. Fair tolerance for LE ex as noted below. Educated pt on elevation of LE's due to swelling and pt expressed understanding. Nurses present and retrieved BSC. Pt sit <>stand with min A/RW. Able to progress gt distance to 4ft with RW to [facility] with min A of 2. Lisbet slow with step to gt and decr'd stance time L LE, WBAT. Left seated on [facility] with nursing present. Will continue per POC  Progression toward goals:   [x]      Improving appropriately and progressing toward goals  []      Improving slowly and progressing toward goals  []      Not making progress toward goals and plan of care will be adjusted     PLAN:  Patient continues to benefit from skilled intervention to address the above impairments. Continue treatment per established plan of care.   Discharge Recommendations:  Skilled Nursing Facility  Further Equipment Recommendations for Discharge:  N/A     SUBJECTIVE:   Patient stated Can I use the bathroom?     OBJECTIVE DATA SUMMARY:   Critical Behavior:  Neurologic State: Alert, Appropriate for age  Orientation Level: Oriented X4  Cognition: Follows commands, Appropriate safety awareness, Appropriate for age attention/concentration, Appropriate decision making  Functional Mobility Training:  Bed Mobility:  Scooting: Contact guard assistance  Transfers:  Sit to Stand: Minimum assistance;Assist x1  Stand to Sit: Minimum assistance;Assist x1  Bed to Chair: Minimum assistance;Assist x2(toBSC)  Balance:  Sitting: Intact  Standing: Impaired; With support  Standing - Static: Fair;Constant support  Standing - Dynamic : Fair;Constant support   Ambulation/Gait Training:  Distance (ft): 4 Feet (ft)  Assistive Device: Gait belt;Walker, rolling  Ambulation - Level of Assistance: Minimal assistance;Assist x2  Gait Description (WDL): Exceptions to WDL  Gait Abnormalities: Antalgic;Decreased step clearance; Step to gait  Left Side Weight Bearing: As tolerated  Stance: Left decreased  Speed/Lisbet: Slow  Step Length: Right shortened;Left shortened  Swing Pattern: Right asymmetrical;Left asymmetrical  Interventions: Safety awareness training; Tactile cues; Verbal cues; Visual/Demos  Therapeutic Exercises:       EXERCISE   Sets   Reps   Active Active Assist   Passive Self ROM   Comments   Ankle Pumps 1 10  [x] [] [] []    Long Arc Quads 1 10 [x] [x] [] [] AAL; Active R     Pain:  Pain level pre-treatment: 4/10  Pain level post-treatment: 4/10   Pain Intervention(s): Medication (see MAR); Rest, Ice, Repositioning   Response to intervention: Nurse notified, See doc flow  Activity Tolerance:   Fair   Please refer to the flowsheet for vital signs taken during this treatment.   After treatment:   [x] Patient left in no apparent distress sitting upon [facility]  [] Patient left in no apparent distress in bed  [x] Call bell left within reach  [x] Nursing notified  [] Caregiver present  [] Bed alarm activated  [] SCDs applied      COMMUNICATION/EDUCATION:   [x]         Role of Physical Therapy in the acute care setting. [x]         Fall prevention education was provided and the patient/caregiver indicated understanding. [x]         Patient/family have participated as able in working toward goals and plan of care. [x]         Patient/family agree to work toward stated goals and plan of care. []         Patient understands intent and goals of therapy, but is neutral about his/her participation.   []         Patient is unable to participate in stated goals/plan of care: ongoing with therapy staff.  []         Other:        Hollis Barrera PT   Time Calculation: 15 mins

## 2021-04-19 NOTE — ED NOTES
TRANSFER - OUT REPORT:    Verbal report given to Arvind Velazquez RN(name) on Sanya Simms  being transferred to [address] [place] [address](unit) for routine progression of care       Report consisted of patients Situation, Background, Assessment and   Recommendations(SBAR). Information from the following report(s) SBAR, Kardex, ED Summary, [facility] and Recent Results was reviewed with the receiving nurse. Lines:   Peripheral IV 04/18/21 Right Antecubital (Active)   Site Assessment Clean, dry, & intact 04/18/21 1700   Phlebitis Assessment 0 04/18/21 1700   Infiltration Assessment 0 04/18/21 1700   Dressing Status Clean, dry, & intact 04/18/21 1700   Dressing Type Transparent;Tape 04/18/21 1700   Hub Color/Line Status Green;Flushed;Patent 04/18/21 1700   Action Taken Blood drawn 04/18/21 1700        Opportunity for questions and clarification was provided.       Patient transported with:   Registered Nurse

## 2021-04-19 NOTE — PROGRESS NOTES
TRANSFER - IN REPORT:    Verbal report received from [facility]) on Beba You  being received from ED(unit) for routine progression of care      Report consisted of patients Situation, Background, Assessment and   Recommendations(SBAR). Information from the following report(s) SBAR was reviewed with the receiving nurse. Opportunity for questions and clarification was provided. Assessment completed upon patients arrival to unit and care assumed.

## 2021-04-19 NOTE — NURSE NAVIGATOR
[place]re [place] rounded on post anterior hip replacement. Discussed the following during rounding: Activity:  OOB for all meals. Promoting Circulation  Ankle pumps 10 times an hour at hospital & home. Follow the exercises and mobility instructions provided by Physical Therapy. Change positions every hour to help ease stiffness and soreness. Pain Control:  Pain medications side effects discussed. Use ice, distraction, moving, & change position to help with pain. Rest between activity. Reminded narcotics and anesthesia cause constipation so need to take stool softener/mild laxative daily while on narcotics. Reviewed how to safely elevate the leg after exercises for 30 minutes and before bed to decrease swelling. Incentive Spirometry:    Use of incentive spirometer 10 x/hr 1000ml x 5  Diet:   Eat for healing. Drink enough fluids so urine is lemonade in color. .   Reminded medication can cause nausea if taken on an empty stomach so need to eat before taking them. Patient Safety:   Call light & belongings in reach. Call for help when want to walk or get OOB. Hollis Lazo verbalized understand. Given the opportunity for asking questions.    Orthopedic Navigator

## 2021-04-19 NOTE — CONSULTS
[facility]  (A Division of [address] [place] [address])                                                                                                                       Consultation Note    Lillian Lees MD  Work Cell #: [phone]. If no call or text back within 30 minutes, please call office number. (Prefer text when possible)  Office #:     [phone]-CHANDA #[phone]   Office Fax: [phone]    Date of Admission: 4/18/2021    Date of Note: 4/19/2021      Reason for Referral: I was asked to see patient by ELBA Smalls for evaluation and management of cellulitis    Thank you for involving me in the care of this patient. Please do not hesitate to contact me on the above number if question or concern. Current Antimicrobials:    Prior Antimicrobials:  Cefepime 4/18 to date  Vanco 4/18 to date Oral doxycycline  POA       Assessment: Recommendation/Plan:    Doubt left hip Cellulitis- redness and bruising associated with hematoma- which is apparent on clinical exam as well as on CT   S/P left LESLIE for AVN 4/11/21   Leucocytosis-  Likely multifactorial- reactive to bleeding + on IV  Steroid on board. Doubt sepsis   Anemia 2/2 Hematoma-post op   , CRP -elevated to 134 and 10 respectively- 4/18. Multiple issues can contribute\   Scrotal swelling without erythema/cellulitis    Other Medical issues going on:   PAF   Hx of acute pul edema   HTN   CKD   Recent COPD exacerbation- dc 4/14   Chronic resp failure, on home O2   CKD    Past ID history:  Past ID   Doxy for 10 days on d/c 4/14/21 for COPD exacerbation    Clinical picture fitting with hematoma with SIRS. Would be cautious with ABX on his case. Risk/side effect >> benefit. Can DC vancomycin today and cut back Cefepime all together in 48 hours unless meaningful pathogen growth from wound culture if both were targeting his soft tissue changes.  If Cefepime on for COPD issues, will defer to primary. Will check procal--for baseline    ESR/CRP noted elevated but likely reactive to acute medical issues. D/w Dr Thierry Rice. Will follow with you. Microbiology:  Blood culture:   = NGSF    Urine culture:N/A    Respiratory culture:N/A      Body Fluid/ CSF/ wound/drainage culture:  : pending gram stain, culture    Cath tip/ PICC culture: N/A      Lines / Catheters: right arm PIV      HPI:  Cici Lutz is 66 y.o. WHITE male patient with PMH  Chronic resp failure on home O2 2L per day, with recent admission for COPD exacerbation with recent dc from hospital on , hx of AVN left hip and post LESLIE 21 post op course complicated by hematoma and sent home on oral doxycycline for COPD exacerbation, comes in to ED from SNF for left hip pain and inablity to move. Denies F/C/worsening of cough/SOB. Urinates Ok, but  Noted his scrotal swelling. He is started on IV abx for suspected cellulitis of left hip. Seen by Ortho since admission, HB is low. And ZULUAGA for transfusion in progress.     COVID 10- ne/3/38    Retired        C/[employer] [address] Problems    Diagnosis Date Noted    Cellulitis 2021    Acute hip pain 2021     Past Medical History:   Diagnosis Date    Brain aneurysm     Cancer [facility])     prostate    COPD (chronic obstructive pulmonary disease) (HCC)     Hypertension     Nocturia     Pneumonia     Prostate neoplasm     Radiation effect      Past Surgical History:   Procedure Laterality Date    HX HERNIA REPAIR      HX HIP ARTHROSCOPY  2021     Family History   Problem Relation Age of Onset    Heart Disease Mother      Social History     Socioeconomic History    Marital status:      Spouse name: Not on file    Number of children: Not on file    Years of education: Not on file    Highest education level: Not on file   Occupational History    Not on file   Social Needs    Financial resource strain: Not on file    Food insecurity Worry: Not on file     Inability: Not on file    Transportation needs     Medical: Not on file     Non-medical: Not on file   Tobacco Use    Smoking status: Former Smoker     Types: Cigarettes    Smokeless tobacco: Never Used   Substance and Sexual Activity    Alcohol use: No    Drug use: Never    Sexual activity: Not on file   Lifestyle    Physical activity     Days per week: Not on file     Minutes per session: Not on file    Stress: Not on file   Relationships    Social connections     Talks on phone: Not on file     Gets together: Not on file     Attends Sikh service: Not on file     Active member of club or organization: Not on file     Attends meetings of clubs or organizations: Not on file     Relationship status: Not on file    Intimate partner violence     Fear of current or ex partner: Not on file     Emotionally abused: Not on file     Physically abused: Not on file     Forced sexual activity: Not on file   Other Topics Concern    Not on file   Social History Narrative    Not on file       Allergies:  Aspirin and Bactrim [sulfamethoxazole-trimethoprim]     Medications:  Current Facility-Administered Medications   Medication Dose Route Frequency    furosemide (LASIX) injection 40 mg  40 mg IntraVENous Q12H    budesonide (PULMICORT) 500 mcg/2 ml nebulizer suspension  500 mcg Nebulization BID RT    arformoteroL (BROVANA) neb solution 15 mcg  15 mcg Nebulization BID RT    0.9% sodium chloride infusion 250 mL  250 mL IntraVENous PRN    Vancomycin - Pharmacy to Dose  1 Each Other Rx Dosing/Monitoring    cefepime (MAXIPIME) 2 g in sterile water (preservative free) 10 mL IV syringe  2 g IntraVENous Q12H    vancomycin (VANCOCIN) 1750 mg in  ml infusion  1,750 mg IntraVENous Q18H    acetaminophen (TYLENOL) tablet 650 mg  650 mg Oral Q4H PRN    albuterol-ipratropium (DUO-NEB) 2.5 MG-0.5 MG/3 ML  3 mL Nebulization Q4H PRN    amLODIPine (NORVASC) tablet 5 mg  5 mg Oral DAILY    dilTIAZem IR (CARDIZEM) tablet 30 mg  30 mg Oral TIDAC    enoxaparin (LOVENOX) injection 40 mg  40 mg SubCUTAneous DAILY    ferrous sulfate tablet 325 mg  325 mg Oral BID WITH MEALS    famotidine (PEPCID) tablet 20 mg  20 mg Oral DAILY    tamsulosin (FLOMAX) capsule 0.4 mg  0.4 mg Oral QPM    methylPREDNISolone (PF) (SOLU-MEDROL) injection 40 mg  40 mg IntraVENous Q12H    sodium chloride (NS) flush 5-40 mL  5-40 mL IntraVENous Q8H    sodium chloride (NS) flush 5-40 mL  5-40 mL IntraVENous PRN    acetaminophen (TYLENOL) tablet 650 mg  650 mg Oral Q6H PRN    Or    acetaminophen (TYLENOL) suppository 650 mg  650 mg Rectal Q6H PRN    polyethylene glycol (MIRALAX) packet 17 g  17 g Oral DAILY PRN    promethazine (PHENERGAN) tablet 12.5 mg  12.5 mg Oral Q6H PRN    Or    ondansetron (ZOFRAN) injection 4 mg  4 mg IntraVENous Q6H PRN        ROS:  A comprehensive review of systems was negative except for that written in the History of Present Illness. Physical Exam:    HPI:  Temp (24hrs), Av.2 °F (36.8 °C), Min:98 °F (36.7 °C), Max:98.8 °F (37.1 °C)    Visit Vitals  /62   Pulse 80   Temp 98 °F (36.7 °C)   Resp 18   Ht 5' 8\" (1.727 m)   Wt 105.6 kg (232 lb 11.2 oz)   SpO2 100%   BMI 35.38 kg/m²       General: Well developed, well nourished 66 y.o. WHITE male in no acute distress. ENT: ENT exam normal, no neck nodes or sinus tenderness  Head: normocephalic, without obvious abnormality  Neck: supple, symmetrical, trachea midline   Cardio:  regular rate and rhythm, S1, S2 normal, no murmur, click, rub or gallop  Chest: inspection normal - no chest wall deformities or tenderness, respiratory effort normal  Lungs: unlabored breathing and has scattered expiratory wheezes  Abdomen: soft, Obese, non-tender. Bowel sounds normal. No masses, no organomegaly. Extremities: has B/L LE edema and scrotal edema  SKIN: erythema/deep bruising over the surgical area on left hip, anteriorly and posteriorly.  No calor  Neuro: Grossly normal  Alert and oriented to person, place, and time; normal strength and tone. Hard of hearing, even with hearing aid-left ear. Mental status: Alert, oriented, thought content appropriate       Lab results:    Chemistry  Recent Labs     21  02321  1517   GLU 97 125*    136   K 3.7 3.7    98*   CO2 30 29   BUN 36* 43*   CREA 1.18 1.35*   CA 6.7* 6.4*   AGAP 6 9   BUCR 31* 32*   AP 72 78   TP 4.9* 5.4*   ALB 2.4* 2.4*   GLOB 2.5 3.0   AGRAT 1.0 0.8       CBC w/ Diff  Recent Labs     21  1517   WBC 13.2 19.7*   RBC 2.24* 2.60*   HGB 6.7* 7.8*   HCT 20.7* 24.2*    288   GRANS 91* 84*   LYMPH 2* 5*   EOS 1 0       Microbiology  All Micro Results     Procedure Component Value Units Date/Time    CULTURE, Kimberlee Elizabeth STAIN [[id number]] Collected: 21    Order Status: Completed Specimen: Wound from Hip Updated: 21 0910    CULTURE, BLOOD [[id number]] Collected: 21    Order Status: Completed Specimen: Blood Updated: 21     Special Requests: NO SPECIAL REQUESTS        Culture result: NO GROWTH AFTER 15 HOURS       CULTURE, BLOOD [[id number]] Collected: 21    Order Status: Completed Specimen: Blood Updated: 21     Special Requests: NO SPECIAL REQUESTS        Culture result: NO GROWTH AFTER 15 HOURS              Imagin/18: CT abd/pelvis with contrast     No evidence of Susana's gangrene. Extensive stranding of the subcutaneous  tissues of the hips and thighs bilaterally superficial fluid suggesting  cellulitis and/or edema. Correlate with clinical findings to exclude compartment  syndrome.     There is a left hip arthroplasty. There is high density focus seen anterior to  the left femur at the level of the arthroplasty suggesting a postoperative  hematoma.  Hematoma is more prominent than on prior exam. Small dot of gas is  seen adjacent to the left hip joint which may be postoperative, however If there  is any clinical concern for septic arthritis then I would advise aspiration the  hip joint.     Loculated pleural fluid collection in the right hemithorax with calcification  the parietal and visceral pleura unchanged.     Right adrenal adenoma.     Bilateral fairly large hydroceles.     Total duration of time spent with patient interview and exam and discussion of plan of care, review of chart in care everywhere, discussion with medical staff- nursing/attending and additional specialities as indicated: 65 minutes

## 2021-04-19 NOTE — PROGRESS NOTES
Problem: Pressure Injury - Risk of  Goal: *Prevention of pressure injury  Description: Document Nikos Scale and appropriate interventions in the flowsheet.   Outcome: Progressing Towards Goal  Note: Pressure Injury Interventions:  Sensory Interventions: Assess changes in LOC    Moisture Interventions: Absorbent underpads    Activity Interventions: Assess need for specialty bed, PT/OT evaluation    Mobility Interventions: Assess need for specialty bed, HOB 30 degrees or less, PT/OT evaluation    Nutrition Interventions: Document food/fluid/supplement intake                     Problem: Patient Education: Go to Patient Education Activity  Goal: Patient/Family Education  Outcome: Progressing Towards Goal

## 2021-04-19 NOTE — CONSULTS
Consultation Note    Assessment:     S/P left LESLIE. Delsa Severance no evidence of sepsis. . probable hematoma. .marked LLE swelling. . no evidence of DVT    Principal Problem:    Cellulitis (4/18/2021)    Active Problems:    Acute hip pain (4/18/2021)        Plan:     He needs to be mobilized. .. aggressive PT . Delsa Severance OOB to chair to mobilize the edema. . RANDAL hose. Delsa Severance lymphedema management. . ID has been consulted. .. incisional care. .    Subjective:     I was asked by Dr. Alondra Gerber to evaluate Luciano for possible septic hip . He  is a 66 y.o. male admitted on 4/18/2021 for Cellulitis [L03.90]  Acute hip pain [M25.559]. He is s/p a left LESLIE +/- 09 April 21. Delsa Severance He developed a hematoma and marked swelling of the proximal thigh. The pain at this time is localized to the anterior thigh and not the groin. The swelling is circumferential and has much ecchymosis on the buttock region. Enlarged scrotum. . chronic erythema on the lower tibial region. ..+ difficulty weight bearing. . he has not been mobilized . . arthritic knee is painful as well    Allergies   Allergen Reactions    Aspirin Other (comments)     \"messes my stomach up\"    Bactrim [Sulfamethoxazole-Trimethoprim] Unknown (comments)       Current Facility-Administered Medications   Medication Dose Route Frequency Provider Last Rate Last Admin    furosemide (LASIX) injection 40 mg  40 mg IntraVENous Q12H Kitty Jordan MD   40 mg at 04/19/21 0140    budesonide (PULMICORT) 500 mcg/2 ml nebulizer suspension  500 mcg Nebulization BID RT Kitty Jordan MD        arformoteroL (BROVANA) neb solution 15 mcg  15 mcg Nebulization BID RT Kitty Jordan MD        Vancomycin - Pharmacy to Dose  1 Each Other Rx Dosing/Monitoring Harry Leal, [address]        cefepime (MAXIPIME) 2 g in sterile water (preservative free) 10 mL IV syringe  2 g IntraVENous Q12H TRINA Wyatt   2 g at 04/19/21 0540    vancomycin (VANCOCIN) 1750 mg in  ml infusion  1,750 mg IntraVENous Q18H Jasper, Scarlet Nip, PA        acetaminophen (TYLENOL) tablet 650 mg  650 mg Oral Q4H PRN Corey Jordan MD        albuterol-ipratropium (DUO-NEB) 2.5 MG-0.5 MG/3 ML  3 mL Nebulization Q4H PRN Corey Jordan MD        amLODIPine (NORVASC) tablet 5 mg  5 mg Oral DAILY Corey Jordan MD        dilTIAZem IR (CARDIZEM) tablet 30 mg  30 mg Oral TIDAC Corey Jordan MD        enoxaparin (LOVENOX) injection 40 mg  40 mg SubCUTAneous DAILY Corey Jordan MD        ferrous sulfate tablet 325 mg  325 mg Oral BID WITH MEALS Corey Jordan MD        famotidine (PEPCID) tablet 20 mg  20 mg Oral DAILY Corey Jordan MD        tamsulosin (FLOMAX) capsule 0.4 mg  0.4 mg Oral QPM Corey Jordan MD        methylPREDNISolone (PF) (SOLU-MEDROL) injection 40 mg  40 mg IntraVENous Q12H Corey Jordan MD   40 mg at 04/18/21 2347    sodium chloride (NS) flush 5-40 mL  5-40 mL IntraVENous Q8H Corey Jordan MD   10 mL at 04/19/21 0540    sodium chloride (NS) flush 5-40 mL  5-40 mL IntraVENous PRN Corey Jordan MD        acetaminophen (TYLENOL) tablet 650 mg  650 mg Oral Q6H PRN Corey Jordan MD        [address] acetaminophen (TYLENOL) suppository 650 mg  650 mg Rectal Q6H PRN Corey Jordan MD        polyethylene glycol (MIRALAX) packet 17 g  17 g Oral DAILY PRN Corey Jordan MD        promethazine (PHENERGAN) tablet 12.5 mg  12.5 mg Oral Q6H PRN Corey Jordan MD        Or    ondansetron (ZOFRAN) injection 4 mg  4 mg IntraVENous Q6H PRN Corey Jordan MD           Past Medical History:   Diagnosis Date    Brain aneurysm     Cancer [facility])     prostate    COPD [facility]chronic obstructive pulmonary disease) ([facility] [address]     Hypertension     Nocturia     Pneumonia     Prostate neoplasm     Radiation effect      Past Surgical History:   Procedure Laterality Date    HX HERNIA REPAIR      HX HIP ARTHROSCOPY  04/09/2021     Family History   Problem Relation Age of Onset    Heart Disease Mother      Social History     Socioeconomic History    Marital status:      Spouse name: Not on file    Number of children: Not on file    Years of education: Not on file    Highest education level: Not on file   Tobacco Use    Smoking status: Former Smoker     Types: Cigarettes    Smokeless tobacco: Never Used   Substance and Sexual Activity    Alcohol use: No    Drug use: Never       Constitutional:  No Fever, chills, sweats; No weight loss    Skin:  + ecchymosis   HEENT:     Cardiovascular:  No CP,    No nausea or diaphoresis. + SOB, KUMAR,   Respiratory:  Pt reports no cough, wheezing or SOB. No sputum production   Gastrointestinal:  No nausea, vomiting, constipation, or diarrhea. No abdominal pain. Genitourinary:  No dysuria; No urinary frequency or urgency   Musculoskeletal:  No joint pain, + redness, or swelling. No back pain. Endo:  No polyuria; No heat or cold intolerance   Heme:  + buttock bruising; No unexplained fevers   Neurological:    No focal motor or sensory symptoms. Psychiatric:         Vitals:    04/18/21 1800 04/18/21 1815 04/18/21 2144 04/19/21 0420   BP: (!) 123/53 (!) 129/49 (!) 128/54 (!) 118/55   Pulse: 83 84 77 75   Resp: 26 18 20 18   Temp:   98 °F (36.7 °C) 98 °F (36.7 °C)   SpO2: 100% 100% 100% 100%   Weight:       Height:           General appearance: alert, cooperative, no distress, appears stated age  Lungs: normal respiratory effort  Heart: normal apical impulse  Abdomen: soft  The entire LLE is swolllen . . the RLA is swollen. . + distal pulses . intact to light touch. .. neg. Kaleigh's. . negative log roll and no hip pain with abduction and limited flexion. . Marked TTP over the lateral and anterior thigh>>> groin TTP which is minimal.  Knee is markedly TTP w/ painful ROM. Rose Kendrick Well healed incision. ..no drainage.  + superficial skin loss related to previous blistering on the lateral hip region. . + marked ecchymosis. . GT region and buttocks. Labs: Results:   CBC Recent Labs     04/19/21 0235   WBC 13.2   HCT 20.7*   MCV 92.4      Chemistry Recent Labs     04/19/21 0235 04/18/21  1517    136   CO2 30 29   BUN 36* 43*   INR  --  1.1       Recent Labs     04/19/21 0235   AGRAT 1.0      Urine No results for input(s): UGLU in the last 72 hours. No lab exists for component: SOURCEUR, UBILIRUBIN, UKET, USPG, UOCB, UPH, UPSC, UUROBILISQ, UNITR, URINELEUKOC   POC Glucose  No components found for: [place]       Radiographs: CT scan . .+ hematoma. . no joint regional fluid. Lakisha Corbett MD

## 2021-04-19 NOTE — PROGRESS NOTES
Bedside and Verbal shift change report given to [address] (oncoming nurse) by Ricardo Quinones (offgoing nurse). Report included the following information SBAR, Kardex, Intake/Output, MAR and Recent Results. 0103- Pt /50, Pt BP trending low.  notified. Orders for albumin placed by MD. 0200 dose of lasix is to be held per MD.     Shift Summary-  Patient Vitals for the past 12 hrs:   Temp Pulse Resp BP SpO2   04/20/21 0641 -- 72 -- (!) 116/54 --   04/20/21 0213 98.2 °F (36.8 °C) 70 18 (!) 95/59 99 %   04/20/21 0137 98.3 °F (36.8 °C) 72 18 97/65 99 %   04/20/21 0030 98 °F (36.7 °C) 65 19 (!) 107/50 98 %   04/19/21 2344 98.2 °F (36.8 °C) 69 18 (!) 105/44 99 %   04/19/21 2328 98 °F (36.7 °C) 70 18 (!) 107/46 98 %   04/19/21 2142 98.1 °F (36.7 °C) 79 19 (!) 118/50 99 %   Pt denied pain/discomfort    Bedside and Verbal shift change report given to PAUL Castellon (oncoming nurse) by [address] (offgoing nurse). Report included the following information SBAR, Kardex, Intake/Output, MAR and Recent Results.

## 2021-04-19 NOTE — PROGRESS NOTES
Bedside and Verbal shift change report given to Allie Bhatt RN (oncoming nurse) by Nikki Jacobs RN (offgoing nurse). Report included the following information SBAR, Kardex, OR Summary, Procedure Summary, Intake/Output and MAR.  
 
0272- Paged MD due to Hemoglobin of 6.7. Received orders to transfuse 1 unit RBCs.

## 2021-04-19 NOTE — PROGRESS NOTES
Hospitalist Progress Note    Patient: Beba You MRN: [id number]  [facility]: [id number]    YOB: 1945  Age: 66 y.o. Sex: male    DOA: 4/18/2021 LOS:  LOS: 1 day                Assessment/Plan     Patient Active Problem List   Diagnosis Code    Hypertension I10    COPD (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 ([facility]) J44.9    Chronic renal disease, stage 3, moderately decreased glomerular filtration rate between 30-59 mL/min/1.73 square meter ([facility]) N18.30    [facility] of [facility] J44.1    Debility R53.81    Chronic respiratory failure with hypoxia ([facility]) J96.11    Tobacco dependence F17.200    Left hip pain M25.552    PAF (paroxysmal atrial fibrillation) ([facility]) I48.0    Avascular necrosis of bone of left hip ([facility]) M87.052    Acute respiratory failure with hypoxia and hypercapnia ([facility]) J96.01, J96.02    Obesity E66.9    Acute pulmonary edema ([facility]) J81.0    Anemia D64.9    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 0XA            66 y.o.  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 Very hard of hearing      Left hip pain/Hematoma -  Recent left total hip arthroplasty for avascular necrosis of the left femoral head with collapse. CT scan of abdomen and pelvis showed Extensive stranding of the subcutaneous  tissues of the hips and thighs bilaterally superficial fluid suggesting  cellulitis and/or edema. There is high density focus seen anterior to the left femur at the level of the arthroplasty suggesting a postoperative hematoma. Hematoma is more prominent than on prior exam.  Discussed with Dr. Aydee Veliz (ID). Do not think this is infection. Skin changes are ecchymosis/hematoma. Recommended stopping vancomycin. Continue with cefepime if no evidence of infection in next 48 hours, then d/c cefepime. Scrotal edema/Hydrocele -  Started on lasix  Continue with IV albumin    COPD -  Continue with steroids, neb treatment as needed. PAF -  On cardizem    Anemia -  Blood transfusion ordered  Secondary to acute blood loss. Monitor H/H.    DVT prophylaxis will stop lovenox due to hematoma and anemia. Disposition : TBD    Review of systems  General: No fevers or chills. Cardiovascular: No chest pain or pressure. No palpitations. Pulmonary: No shortness of breath. Gastrointestinal: No nausea, vomiting. Physical Exam:  General: Awake, cooperative, no acute distress    HEENT: NC, Atraumatic. PERRLA, anicteric sclerae. Lungs: CTA Bilaterally. No Wheezing/Rhonchi/Rales. Heart:  S1 S2,  No murmur, No Rubs, No Gallops  Abdomen: Soft, Non distended, Non tender.  +Bowel sounds, scrotal edema  Extremities: Left hip bruising. Psych:   Not anxious or agitated. Neurologic:  No acute neurological deficit.            Vital signs/Intake and Output:  Visit Vitals  BP (!) 106/50   Pulse 75   Temp 98 °F (36.7 °C)   Resp 19   Ht 5' 8\" (1.727 m)   Wt 105.6 kg (232 lb 11.2 oz)   SpO2 100%   BMI 35.38 kg/m²     Current Shift:  04/19 0701 - 04/19 1900  In: -   Out: 500 [Urine:500]  Last three shifts:  04/17 1901 - 04/19 0700  In: 1000 [I.V.:1000]  Out: 900 [Urine:900]            Labs: Results:       Chemistry Recent Labs     04/19/21  0235 04/18/21  1517   GLU 97 125*    136   K 3.7 3.7    98*   CO2 30 29   BUN 36* 43*   CREA 1.18 1.35*   CA 6.7* 6.4*   AGAP 6 9   BUCR 31* 32*   AP 72 78   TP 4.9* 5.4*   ALB 2.4* 2.4*   GLOB 2.5 3.0   AGRAT 1.0 0.8      CBC w/Diff Recent Labs     04/19/21  0235 04/18/21  1517   WBC 13.2 19.7*   RBC 2.24* 2.60*   HGB 6.7* 7.8*   HCT 20.7* 24.2*    288   GRANS 91* 84*   LYMPH 2* 5*   EOS 1 0      Cardiac Enzymes No results for input(s): CPK, CKND1, WILLARD in the last 72 hours. No lab exists for component: CKRMB, TROIP   Coagulation Recent Labs     04/18/21  1517   PTP 13.7   INR 1.1   APTT 32.4       Lipid Panel No results found for: CHOL, CHOLPOCT, CHOLX, CHLST, CHOLV, 506535, HDL, HDLP, LDL, LDLC, DLDLP, 264734, VLDLC, VLDL, TGLX, TRIGL, TRIGP, TGLPOCT, CHHD, CHHDX   BNP No results for input(s): BNPP in the last 72 hours.    Liver Enzymes Recent Labs     04/19/21  0235   TP 4.9*   ALB 2.4*   AP 72      Thyroid Studies Lab Results   Component Value Date/Time    TSH 3.06 04/05/2021 01:30 PM        Procedures/imaging: see electronic medical records for all procedures/Xrays and details which were not copied into this note but were reviewed prior to creation of Plan

## 2021-04-19 NOTE — H&P
History & Physical    Patient: Cici Lutz MRN: [id number]  [facility]: [id number]    YOB: 1945  Age: 66 y.o. Sex: male      DOA: 4/18/2021    Chief Complaint:   Chief Complaint   Patient presents with    Post OP Complication     left hip sx on 4/9          HPI:     Cici Lutz is a 66 y.o.  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 Patient had an extensive hospital stay and was discharged 4 days ago please see discharge summary and last H&P for further details. Patient was discharged from ICU several days ago had developed hypotension respiratory failure due to narcotics and developed a large hip hematoma. His hemoglobin had been stable and he did not require multiple transfusions he was discharged on Lovenox to rehab. However there is been oozing through the left hip area worsening leukocytosis.   In the emergency room the lateral skin area was cultured patient had exquisite pain in the scrotal area with edema his orthopedic surgeon was contacted who will evaluate in the morning for possible hip aspiration            Past Medical History:   Diagnosis Date    Brain aneurysm     [facility])     prostate    COPD (chronic obstructive pulmonary disease) ([facility] [address]     Hypertension     Nocturia     Pneumonia     Prostate neoplasm     Radiation effect        Past Surgical History:   Procedure Laterality Date    HX HERNIA REPAIR      HX HIP ARTHROSCOPY  04/09/2021       Family History   Problem Relation Age of Onset    Heart Disease Mother        Social History     Socioeconomic History    Marital status:      Spouse name: Not on file    Number of children: Not on file    Years of education: Not on file    Highest education level: Not on file   Tobacco Use  Smoking status: Former Smoker     Types: Cigarettes    Smokeless tobacco: Never Used   Substance and Sexual Activity    Alcohol use: No    Drug use: Never       Prior to Admission medications    Medication Sig Start Date End Date Taking? Authorizing Provider   arformoteroL (BROVANA) 15 mcg/2 mL nebu neb solution 2 mL by Nebulization route two (2) times a day. 4/14/21   Adebayo Sampson MD   budesonide (PULMICORT) 0.5 mg/2 mL nbsp 2 mL by Nebulization route two (2) times a day. 4/14/21   Adebayo Sampson MD   enoxaparin (LOVENOX) 40 mg/0.4 mL 0.4 mL by SubCUTAneous route daily. 4/14/21   Adebayo Sampson MD   famotidine (PEPCID) 20 mg tablet Take 1 Tab by mouth daily. 4/14/21   Adebayo Sampson MD   ferrous sulfate 325 mg (65 mg iron) tablet Take 1 Tab by mouth two (2) times daily (with meals). 4/14/21   Adebayo Sampson MD   doxycycline (MONODOX) 100 mg capsule Take 1 Cap by mouth two (2) times a day for 10 days. 4/14/21 4/24/21  Adebayo Sampson MD   predniSONE (STERAPRED) 5 mg dose pack See administration instruction per 5mg dose pack 4/14/21   Adebayo Sampson MD   amLODIPine (NORVASC) 5 mg tablet Take 1 Tab by mouth daily. 3/3/21   Tara Zapata MD   albuterol-ipratropium (DUO-NEB) 2.5 mg-0.5 mg/3 ml nebu 3 mL by Nebulization route every four (4) hours as needed for Wheezing. 2/7/21   Agustin Jordan MD   albuterol (PROVENTIL HFA, VENTOLIN HFA, PROAIR HFA) 90 mcg/actuation inhaler Take 2 Puffs by inhalation every four (4) hours as needed for Wheezing or Shortness of Breath. 2/7/21   Agustin Jordan MD   OXYGEN-AIR DELIVERY SYSTEMS 2 L/min by Nasal route continuous. Provider, Historical   furosemide (Lasix) 20 mg tablet Take 20 mg by mouth daily. Provider, Historical   dilTIAZem (CARDIZEM) 30 mg tablet Take 1 Tab by mouth Before breakfast, lunch, and dinner. Patient taking differently: Take 30 mg by mouth daily.  Daily 4/14/20   Adebayo Sampson MD   acetaminophen (TYLENOL) 325 mg tablet Take 650 mg by mouth every eight (8) hours as needed for Pain. Provider, Historical   dextran 70/hypromellose (ARTIFICIAL TEARS, PF, OP) Apply 2 Drops to eye as needed for Other (both eyes for dryness). Provider, Historical   diphenhydrAMINE (BENADRYL) 25 mg capsule Take 25 mg by mouth nightly as needed for Itching. Provider, Historical   tamsulosin (FLOMAX) 0.4 mg capsule Take 0.4 mg by mouth every evening. Other, MD Blayne       Allergies   Allergen Reactions    Aspirin Other (comments)     \"messes my stomach up\"    Bactrim [Sulfamethoxazole-Trimethoprim] Unknown (comments)         Review of Systems  GENERAL: Patient alert, awake and oriented times 3, able to communicate full sentences and not in distress. HEENT: No change in vision, no earache, tinnitus, sore throat or sinus congestion. NECK: No pain or stiffness. PULMONARY: No shortness of breath, cough or wheeze. Cardiovascular: no pnd or orthopnea, no CP  GASTROINTESTINAL: No abdominal pain, nausea, vomiting or diarrhea, melena or bright red blood per rectum. GENITOURINARY: No urinary frequency, urgency, hesitancy or dysuria. MUSCULOSKELETAL: No joint or muscle pain, no back pain, no recent trauma. DERMATOLOGIC: + rash, no itching, no lesions. ENDOCRINE: No polyuria, polydipsia, no heat or cold intolerance. No recent change in weight. HEMATOLOGICAL: N+anemia or easy bruising or bleeding. NEUROLOGIC: No headache, seizures, numbness, tingling + weakness. Physical Exam:     Physical Exam:  Visit Vitals  BP (!) 129/49   Pulse 84   Temp 98.8 °F (37.1 °C)   Resp 18   Ht 5' 8\" (1.727 m)   Wt 105.6 kg (232 lb 11.2 oz)   SpO2 100%   BMI 35.38 kg/m²    O2 Flow Rate (L/min): 2 l/min O2 Device: Nasal cannula    Temp (24hrs), Av.8 °F (37.1 °C), Min:98.8 °F (37.1 °C), Max:98.8 °F (37.1 °C)     1901 -  0700  In: 1000 [I.V.:1000]  Out: -    No intake/output data recorded. General:  Alert, cooperative, no distress, appears stated age.   Extremely hard of hearing Head: Normocephalic, without obvious abnormality, atraumatic. Eyes:  Conjunctivae/corneas clear. PERRL, EOMs intact. Nose: Nares normal. No drainage or sinus tenderness. Neck: Supple, symmetrical, trachea midline, no adenopathy, thyroid: no enlargement, no carotid bruit and no JVD. Lungs:   Clear to auscultation bilaterally. Diminished breath sounds throughout occasional expiratory wheeze   Heart:  Regular rate and rhythm, S1, S2 normal.     Abdomen: Soft, non-tender. Bowel sounds normal.    Extremities: Extremities normal, atraumatic, no cyanosis +3 pitting edema edema. Pulses: 2+ and symmetric all extremities. Skin:  Erythema is noted across his left hip area near incision area as well as distal lower extremity there is also some skin breakdown of his scrotum with ulcerations at the tip both legs have edema   Neurologic: AAOx3, No focal motor or sensory deficit. Labs Reviewed: All lab results for the last 24 hours reviewed.    and EKG    Procedures/imaging: see electronic medical records for all procedures/Xrays and details which were not copied into this note but were reviewed prior to creation of Plan  Recent Results (from the past 12 hour(s))   EKG, 12 LEAD, INITIAL    Collection Time: 04/18/21  3:05 PM   Result Value Ref Range    Ventricular Rate 96 BPM    Atrial Rate 96 BPM    P-R Interval 164 ms    QRS Duration 84 ms    Q-T Interval 354 ms    QTC Calculation (Bezet) 447 ms    Calculated P Axis 56 degrees    Calculated R Axis 16 degrees    Calculated T Axis 49 degrees    Diagnosis       Normal sinus rhythm  Normal ECG  When compared with ECG of 05-APR-2021 13:22,  No significant change was found     CBC WITH AUTOMATED DIFF    Collection Time: 04/18/21  3:17 PM   Result Value Ref Range    WBC 19.7 (H) 4.6 - 13.2 K/uL    RBC 2.60 (L) 4.35 - 5.65 M/uL    HGB 7.8 (L) 13.0 - 16.0 g/dL    HCT 24.2 (L) 36.0 - 48.0 %    MCV 93.1 74.0 - 97.0 FL    MCH 30.0 24.0 - 34.0 PG    MCHC 32.2 31.0 - 37.0 g/dL    RDW 15.1 (H) 11.6 - 14.5 %    PLATELET 277 187 - 028 K/uL    MPV 9.6 9.2 - 11.8 FL    NEUTROPHILS 84 (H) 40 - 73 %    LYMPHOCYTES 5 (L) 21 - 52 %    MONOCYTES 6 3 - 10 %    EOSINOPHILS 0 0 - 5 %    BASOPHILS 0 0 - 2 %    ABS. NEUTROPHILS 16.7 (H) 1.8 - 8.0 K/UL    ABS. LYMPHOCYTES 1.1 0.9 - 3.6 K/UL    ABS. MONOCYTES 1.1 0.05 - 1.2 K/UL    ABS. EOSINOPHILS 0.1 0.0 - 0.4 K/UL    ABS. BASOPHILS 0.0 0.0 - 0.1 K/UL    DF AUTOMATED     METABOLIC PANEL, COMPREHENSIVE    Collection Time: 04/18/21  3:17 PM   Result Value Ref Range    Sodium 136 136 - 145 mmol/L    Potassium 3.7 3.5 - 5.5 mmol/L    Chloride 98 (L) 100 - 111 mmol/L    CO2 29 21 - 32 mmol/L    Anion gap 9 3.0 - 18 mmol/L    Glucose 125 (H) 74 - 99 mg/dL    BUN 43 (H) 7.0 - 18 MG/DL    Creatinine 1.35 (H) 0.6 - 1.3 MG/DL    BUN/Creatinine ratio 32 (H) 12 - 20      GFR est AA >60 >60 ml/min/1.73m2    GFR est non-AA 51 (L) >60 ml/min/1.73m2    Calcium 6.4 (L) 8.5 - 10.1 MG/DL    Bilirubin, total 0.5 0.2 - 1.0 MG/DL    ALT (SGPT) 33 16 - 61 U/L    AST (SGOT) 25 10 - 38 U/L    Alk. phosphatase 78 45 - 117 U/L    Protein, total 5.4 (L) 6.4 - 8.2 g/dL    Albumin 2.4 (L) 3.4 - 5.0 g/dL    Globulin 3.0 2.0 - 4.0 g/dL    A-G Ratio 0.8 0.8 - 1.7     PROTHROMBIN TIME + INR    Collection Time: 04/18/21  3:17 PM   Result Value Ref Range    Prothrombin time 13.7 11.5 - 15.2 sec    INR 1.1 0.8 - 1.2     PTT    Collection Time: 04/18/21  3:17 PM   Result Value Ref Range    aPTT 32.4 23.0 - 36.4 SEC   NT-PRO BNP    Collection Time: 04/18/21  3:17 PM   Result Value Ref Range    NT pro-BNP 73 0 - 1,800 PG/ML   POC LACTIC ACID    Collection Time: 04/18/21  4:33 PM   Result Value Ref Range    Lactic Acid (POC) 1.40 0.40 - 2.00 mmol/L       Assessment/Plan     Active Problems:    * No active hospital problems. *  1.   Left hip pain with large amount of edema and known hematoma patient received blood cultures in the emergency room there was superficial cultures obtained of the hip area and ID consult will be placed for the a.m. in the meantime he is started on empiric antibiotics in the form of cefepime and vancomycin for his chronic leukocytosis and stasis changes of the lower extremity with erythema  2. Hypoalbuminemia worsening lower extremity edema he is being given IV albumin as well as diuretics  3. COPD patient has been on chronic prednisone likely worsening leukocytosis and edema I have placed him on low-dose IV steroids as he appears back to his baseline from COPD he is continued on his nebulizers  4. Paroxysmal atrial fibrillation patient was on Lovenox DVT prevention dose he will need NOAC once hematoma from hip has resolved  5. Ambulatory dysfunction post hip placement will obtain PT consult  6.   Chronic leukocytosis patient was discharged on doxycycline appropriately this is likely worsened by superficial skin cellulitis and cellulitis and steroids unsure if patient would benefit from hip aspiration Ortho was consulted in the emergency room  DVT/GI Prophylaxis: Lovenox  Per patient's previous wishes he has a partial code with intubation but no CPR shock or chest compressions    Dusty Frias MD  4/18/2021 8:26 PM

## 2021-04-19 NOTE — WOUND CARE
21 1035   Wound Leg upper Anterior;Left;Proximal large red skin tear   Date First Assessed/Time First Assessed: 21 2300   Primary Wound Type: Skin Tear  Location: Leg upper  Wound Location Orientation: Anterior;Left;Proximal  Wound Description: large red skin tear   Wound Image    Wound Etiology Surgical   Dressing Status Breakthrough drainage noted   Cleansed Cleansed with saline   Dressing/Treatment Alginate with Ag;Silicone border   Dressing Change Due   (PRN)   Wound Length (cm) 13 cm   Wound Width (cm) 4.5 cm   Wound Depth (cm) 0.1 cm   Wound Surface Area (cm^2) 58.5 cm^2   Wound Volume (cm^3) 5.85 cm^3   Wound Assessment Erythema;Pink/red;Ruptured blister   Drainage Amount Small   Drainage Description Serosanguinous   Wound Odor None   Camryn-Wound/Incision Assessment Ecchymosis;Edematous   Edges Unattached edges   Wound Thickness Description Partial thickness       IP WOUND CONSULT    Naomi Iyer  MEDICAL RECORD NUMBER:  [id number]  AGE: 66 y.o.    GENDER: male  : 1945  TODAY'S DATE:  2021    GENERAL     [] Follow-up   [x] New Consult    Naomi Iyer is a 66 y.o. male referred by:   [] Physician  [x] Nursing  [] Other:         PAST MEDICAL HISTORY    Past Medical History:   Diagnosis Date    Brain aneurysm     Cancer ([facility] [address]     prostate    COPD [facility]chronic obstructive pulmonary disease) ([facility] [address]     Hypertension     Nocturia     Pneumonia     Prostate neoplasm     Radiation effect         PAST SURGICAL HISTORY    Past Surgical History:   Procedure Laterality Date    HX HERNIA REPAIR      HX HIP ARTHROSCOPY  2021       FAMILY HISTORY    Family History   Problem Relation Age of Onset    Heart Disease Mother        SOCIAL HISTORY    Social History     Tobacco Use    Smoking status: Former Smoker     Types: Cigarettes    Smokeless tobacco: Never Used   Substance Use Topics    Alcohol use: No    Drug use: Never       ALLERGIES    Allergies   Allergen Reactions    Aspirin Other (comments)     \"messes my stomach up\"    Bactrim [Sulfamethoxazole-Trimethoprim] Unknown (comments)       MEDICATIONS    No current facility-administered medications on file prior to encounter. Current Outpatient Medications on File Prior to Encounter   Medication Sig Dispense Refill    arformoteroL (BROVANA) 15 mcg/2 mL nebu neb solution 2 mL by Nebulization route two (2) times a day. 30 Vial 0    budesonide (PULMICORT) 0.5 mg/2 mL nbsp 2 mL by Nebulization route two (2) times a day. 30 Each 0    enoxaparin (LOVENOX) 40 mg/0.4 mL 0.4 mL by SubCUTAneous route daily. 21 Syringe 0    famotidine (PEPCID) 20 mg tablet Take 1 Tab by mouth daily. 30 Tab 0    ferrous sulfate 325 mg (65 mg iron) tablet Take 1 Tab by mouth two (2) times daily (with meals). 30 Tab 0    doxycycline (MONODOX) 100 mg capsule Take 1 Cap by mouth two (2) times a day for 10 days. 20 Cap 0    predniSONE (STERAPRED) 5 mg dose pack See administration instruction per 5mg dose pack 21 Tab 0    amLODIPine (NORVASC) 5 mg tablet Take 1 Tab by mouth daily. 30 Tab 0    albuterol-ipratropium (DUO-NEB) 2.5 mg-0.5 mg/3 ml nebu 3 mL by Nebulization route every four (4) hours as needed for Wheezing. 30 Nebule 0    albuterol (PROVENTIL HFA, VENTOLIN HFA, PROAIR HFA) 90 mcg/actuation inhaler Take 2 Puffs by inhalation every four (4) hours as needed for Wheezing or Shortness of Breath. 1 Inhaler 1    OXYGEN-AIR DELIVERY SYSTEMS 2 L/min by Nasal route continuous.  furosemide (Lasix) 20 mg tablet Take 20 mg by mouth daily.  dilTIAZem (CARDIZEM) 30 mg tablet Take 1 Tab by mouth Before breakfast, lunch, and dinner. (Patient taking differently: Take 30 mg by mouth daily. Daily) 90 Tab 0    acetaminophen (TYLENOL) 325 mg tablet Take 650 mg by mouth every eight (8) hours as needed for Pain.  dextran 70/hypromellose (ARTIFICIAL TEARS, PF, OP) Apply 2 Drops to eye as needed for Other (both eyes for dryness).       diphenhydrAMINE (BENADRYL) 25 mg capsule Take 25 mg by mouth nightly as needed for Itching.  tamsulosin (FLOMAX) 0.4 mg capsule Take 0.4 mg by mouth every evening.          Wt Readings from Last 3 Encounters:   04/18/21 105.6 kg (232 lb 11.2 oz)   04/09/21 98.6 kg (217 lb 4.8 oz)   04/06/21 104.8 kg (231 lb 0.7 oz)       [unfilled]  Visit Vitals  /62   Pulse 80   Temp 98 °F (36.7 °C)   Resp 18   Ht 5' 8\" (1.727 m)   Wt 105.6 kg (232 lb 11.2 oz)   SpO2 100%   BMI 35.38 kg/m²       ASSESSMENT     Skin impairment Identification:  Type: non-healing surgical    Contributing Factors: edema    Wound Leg upper Anterior;Left;Proximal large red skin tear (Active)   Wound Image   04/19/21 1035   Wound Etiology Surgical 04/19/21 1035   Dressing Status Breakthrough drainage noted 04/19/21 1035   Cleansed Cleansed with saline 04/19/21 1035   Dressing/Treatment Alginate with Ag;Silicone border 39/17/28 1035   Wound Length (cm) 13 cm 04/19/21 1035   Wound Width (cm) 4.5 cm 04/19/21 1035   Wound Depth (cm) 0.1 cm 04/19/21 1035   Wound Surface Area (cm^2) 58.5 cm^2 04/19/21 1035   Wound Volume (cm^3) 5.85 cm^3 04/19/21 1035   Wound Assessment Erythema;Pink/red;Ruptured blister 04/19/21 1035   Drainage Amount Small 04/19/21 1035   Drainage Description Serosanguinous 04/19/21 1035   Wound Odor None 04/19/21 1035   Camryn-Wound/Incision Assessment Ecchymosis;Edematous 04/19/21 1035   Edges Unattached edges 04/19/21 1035   Wound Thickness Description Partial thickness 04/19/21 1035   Number of days: 1       Incision 04/09/21 Hip Left (Active)   Number of days: 10          PLAN     Skin Care & Pressure Relief Recommendations  Minimize layers of linen  Pads under patient to optimize support surface  Turn/reposition approximately every 2 hours  Promote continence; Skin Protective lotion/cream to buttocks and sacrum daily and as needed with incontinence care    Physician/Provider notified: Yes  Recommendations: keep area clean and dry, change silicone border dressing PRN, reposition ofter at least q 2 hours    Teaching completed with:   [x] Patient           [] Family member       [] Caregiver          [x] Nursing  [] Other    Patient/Caregiver Teaching:  Level of patient/caregiver understanding able to:   [x] Indicates understanding       [] Needs reinforcement  [] Unsuccessful      [] Verbal Understanding  [] Demonstrated understanding       [] No evidence of learning  [] Refused teaching         [] N/A       Electronically signed by Gerald Brown RN on 4/19/2021 at 10:50 AM

## 2021-04-20 PROBLEM — L03.90 CELLULITIS: Status: RESOLVED | Noted: 2021-04-18 | Resolved: 2021-04-20

## 2021-04-20 LAB
ABO + RH BLD: NORMAL
ALBUMIN SERPL-MCNC: 2.4 G/DL (ref 3.4–5)
ALBUMIN/GLOB SERPL: 1 {RATIO} (ref 0.8–1.7)
ALP SERPL-CCNC: 62 U/L (ref 45–117)
ALT SERPL-CCNC: 22 U/L (ref 16–61)
ANION GAP SERPL CALC-SCNC: 7 MMOL/L (ref 3–18)
AST SERPL-CCNC: 14 U/L (ref 10–38)
BASOPHILS # BLD: 0 K/UL (ref 0–0.1)
BASOPHILS NFR BLD: 0 % (ref 0–2)
BILIRUB SERPL-MCNC: 0.9 MG/DL (ref 0.2–1)
BLD PROD TYP BPU: NORMAL
BLOOD GROUP ANTIBODIES SERPL: NORMAL
BPU ID: NORMAL
BUN SERPL-MCNC: 38 MG/DL (ref 7–18)
BUN/CREAT SERPL: 32 (ref 12–20)
CALCIUM SERPL-MCNC: 6.7 MG/DL (ref 8.5–10.1)
CALLED TO:,BCALL1: NORMAL
CHLORIDE SERPL-SCNC: 101 MMOL/L (ref 100–111)
CO2 SERPL-SCNC: 30 MMOL/L (ref 21–32)
CREAT SERPL-MCNC: 1.19 MG/DL (ref 0.6–1.3)
CROSSMATCH RESULT,%XM: NORMAL
DIFFERENTIAL METHOD BLD: ABNORMAL
EOSINOPHIL # BLD: 0 K/UL (ref 0–0.4)
EOSINOPHIL NFR BLD: 0 % (ref 0–5)
ERYTHROCYTE [DISTWIDTH] IN BLOOD BY AUTOMATED COUNT: 14.8 % (ref 11.6–14.5)
GLOBULIN SER CALC-MCNC: 2.5 G/DL (ref 2–4)
GLUCOSE SERPL-MCNC: 162 MG/DL (ref 74–99)
HCT VFR BLD AUTO: 22.2 % (ref 36–48)
HGB BLD-MCNC: 7.2 G/DL (ref 13–16)
LYMPHOCYTES # BLD: 0.3 K/UL (ref 0.9–3.6)
LYMPHOCYTES NFR BLD: 3 % (ref 21–52)
MAGNESIUM SERPL-MCNC: 2.1 MG/DL (ref 1.6–2.6)
MCH RBC QN AUTO: 29.9 PG (ref 24–34)
MCHC RBC AUTO-ENTMCNC: 32.4 G/DL (ref 31–37)
MCV RBC AUTO: 92.1 FL (ref 74–97)
MONOCYTES # BLD: 0.3 K/UL (ref 0.05–1.2)
MONOCYTES NFR BLD: 3 % (ref 3–10)
NEUTS SEG # BLD: 9.3 K/UL (ref 1.8–8)
NEUTS SEG NFR BLD: 92 % (ref 40–73)
PLATELET # BLD AUTO: 224 K/UL (ref 135–420)
PMV BLD AUTO: 9.5 FL (ref 9.2–11.8)
POTASSIUM SERPL-SCNC: 4 MMOL/L (ref 3.5–5.5)
PROCALCITONIN SERPL-MCNC: <0.05 NG/ML
PROT SERPL-MCNC: 4.9 G/DL (ref 6.4–8.2)
RBC # BLD AUTO: 2.41 M/UL (ref 4.35–5.65)
SODIUM SERPL-SCNC: 138 MMOL/L (ref 136–145)
SPECIMEN EXP DATE BLD: NORMAL
STATUS OF UNIT,%ST: NORMAL
UNIT DIVISION, %UDIV: 0
WBC # BLD AUTO: 10.1 K/UL (ref 4.6–13.2)

## 2021-04-20 PROCEDURE — 83735 ASSAY OF MAGNESIUM: CPT

## 2021-04-20 PROCEDURE — 84145 PROCALCITONIN (PCT): CPT

## 2021-04-20 PROCEDURE — 74011250637 HC RX REV CODE- 250/637: Performed by: INTERNAL MEDICINE

## 2021-04-20 PROCEDURE — 94760 N-INVAS EAR/PLS OXIMETRY 1: CPT

## 2021-04-20 PROCEDURE — 80053 COMPREHEN METABOLIC PANEL: CPT

## 2021-04-20 PROCEDURE — 74011250636 HC RX REV CODE- 250/636: Performed by: PHYSICIAN ASSISTANT

## 2021-04-20 PROCEDURE — 97110 THERAPEUTIC EXERCISES: CPT

## 2021-04-20 PROCEDURE — P9047 ALBUMIN (HUMAN), 25%, 50ML: HCPCS | Performed by: INTERNAL MEDICINE

## 2021-04-20 PROCEDURE — 94640 AIRWAY INHALATION TREATMENT: CPT

## 2021-04-20 PROCEDURE — 74011000250 HC RX REV CODE- 250: Performed by: INTERNAL MEDICINE

## 2021-04-20 PROCEDURE — 74011000250 HC RX REV CODE- 250: Performed by: PHYSICIAN ASSISTANT

## 2021-04-20 PROCEDURE — 36415 COLL VENOUS BLD VENIPUNCTURE: CPT

## 2021-04-20 PROCEDURE — 74011250636 HC RX REV CODE- 250/636: Performed by: INTERNAL MEDICINE

## 2021-04-20 PROCEDURE — 65270000029 HC RM PRIVATE

## 2021-04-20 PROCEDURE — 97530 THERAPEUTIC ACTIVITIES: CPT

## 2021-04-20 PROCEDURE — 85025 COMPLETE CBC W/AUTO DIFF WBC: CPT

## 2021-04-20 RX ORDER — ALBUMIN HUMAN 250 G/1000ML
25 SOLUTION INTRAVENOUS EVERY 6 HOURS
Status: DISCONTINUED | OUTPATIENT
Start: 2021-04-20 | End: 2021-04-23 | Stop reason: HOSPADM

## 2021-04-20 RX ADMIN — DILTIAZEM HYDROCHLORIDE 30 MG: 30 TABLET, FILM COATED ORAL at 06:43

## 2021-04-20 RX ADMIN — METHYLPREDNISOLONE SODIUM SUCCINATE 40 MG: 40 INJECTION, POWDER, FOR SOLUTION INTRAMUSCULAR; INTRAVENOUS at 20:47

## 2021-04-20 RX ADMIN — IPRATROPIUM BROMIDE AND ALBUTEROL SULFATE 3 ML: .5; 3 SOLUTION RESPIRATORY (INHALATION) at 07:10

## 2021-04-20 RX ADMIN — ALBUMIN (HUMAN) 25 G: 0.25 INJECTION, SOLUTION INTRAVENOUS at 20:47

## 2021-04-20 RX ADMIN — Medication 10 ML: at 21:47

## 2021-04-20 RX ADMIN — ALBUMIN (HUMAN) 25 G: 0.25 INJECTION, SOLUTION INTRAVENOUS at 10:26

## 2021-04-20 RX ADMIN — IPRATROPIUM BROMIDE AND ALBUTEROL SULFATE 3 ML: .5; 3 SOLUTION RESPIRATORY (INHALATION) at 03:19

## 2021-04-20 RX ADMIN — FAMOTIDINE 20 MG: 20 TABLET, FILM COATED ORAL at 09:28

## 2021-04-20 RX ADMIN — CEFEPIME HYDROCHLORIDE 2 G: 2 INJECTION, POWDER, FOR SOLUTION INTRAVENOUS at 04:45

## 2021-04-20 RX ADMIN — DILTIAZEM HYDROCHLORIDE 30 MG: 30 TABLET, FILM COATED ORAL at 12:01

## 2021-04-20 RX ADMIN — ALBUMIN (HUMAN) 25 G: 0.25 INJECTION, SOLUTION INTRAVENOUS at 02:15

## 2021-04-20 RX ADMIN — AMLODIPINE BESYLATE 5 MG: 5 TABLET ORAL at 09:29

## 2021-04-20 RX ADMIN — IPRATROPIUM BROMIDE AND ALBUTEROL SULFATE 3 ML: .5; 3 SOLUTION RESPIRATORY (INHALATION) at 15:05

## 2021-04-20 RX ADMIN — ARFORMOTEROL TARTRATE 15 MCG: 15 SOLUTION RESPIRATORY (INHALATION) at 07:10

## 2021-04-20 RX ADMIN — BUDESONIDE 500 MCG: 0.5 INHALANT RESPIRATORY (INHALATION) at 21:43

## 2021-04-20 RX ADMIN — Medication 10 ML: at 16:05

## 2021-04-20 RX ADMIN — ARFORMOTEROL TARTRATE 15 MCG: 15 SOLUTION RESPIRATORY (INHALATION) at 21:43

## 2021-04-20 RX ADMIN — IPRATROPIUM BROMIDE AND ALBUTEROL SULFATE 3 ML: .5; 3 SOLUTION RESPIRATORY (INHALATION) at 11:27

## 2021-04-20 RX ADMIN — ALBUMIN (HUMAN) 25 G: 0.25 INJECTION, SOLUTION INTRAVENOUS at 16:05

## 2021-04-20 RX ADMIN — BUDESONIDE 500 MCG: 0.5 INHALANT RESPIRATORY (INHALATION) at 07:10

## 2021-04-20 RX ADMIN — ACETAMINOPHEN 650 MG: 325 TABLET ORAL at 09:34

## 2021-04-20 RX ADMIN — METHYLPREDNISOLONE SODIUM SUCCINATE 40 MG: 40 INJECTION, POWDER, FOR SOLUTION INTRAMUSCULAR; INTRAVENOUS at 09:27

## 2021-04-20 RX ADMIN — FUROSEMIDE 40 MG: 10 INJECTION INTRAMUSCULAR; INTRAVENOUS at 16:05

## 2021-04-20 RX ADMIN — FERROUS SULFATE TAB 325 MG (65 MG ELEMENTAL FE) 325 MG: 325 (65 FE) TAB at 09:28

## 2021-04-20 RX ADMIN — FERROUS SULFATE TAB 325 MG (65 MG ELEMENTAL FE) 325 MG: 325 (65 FE) TAB at 16:05

## 2021-04-20 RX ADMIN — TAMSULOSIN HYDROCHLORIDE 0.4 MG: 0.4 CAPSULE ORAL at 17:12

## 2021-04-20 NOTE — NURSE NAVIGATOR
Reviewed education with the patient. He is doing fine today. No change from yesterday. Motivated to continue walking with therapy. Still has edema to both legs. Encouraged to continue using incentive spirometer. To drink fluids and eat healthy for healing. Call light in reach.

## 2021-04-20 NOTE — PROGRESS NOTES
[facility]                         (A Division of [address])                                                                                                                       Lillian Chi MD  Work Cell #: [phone]. If no call or text back within 30 minutes, please call office number. (Prefer text when possible)  Office #:     -[phone] #[phone]   Office Fax: [phone]    Date of Admission: 2021       Date of Service:  2021  Consult FU for :     Summary:    Rene Jacobson is 66 y.o. WHITE male patient with PMH  Chronic resp failure on home O2 2L per day, with recent admission for COPD exacerbation with recent dc from hospital on , hx of AVN left hip and post LESLIE 21 post op course complicated by hematoma and sent home on oral doxycycline for COPD exacerbation, comes in to ED from SNF for left hip pain and inablity to move. Denies F/C/worsening of cough/SOB. Urinates Ok, but  Noted his scrotal swelling. He is started on IV abx for suspected cellulitis of left hip. Seen by Ortho since admission, HB is low. And ZULUAGA for transfusion in progress.     COVID 10- ne04     Retired        Current Antimicrobials:    Prior Antimicrobials:  Cefepime  to date   Vanco  to   Oral doxycycline POA       Subjective:  Left hip pain persist. No fever/chills/ issue with N or V     Review of System:  No SOB  No itching or rash  No new onset joint pain  No diarrhea       Assessment: Rec / Plan:   · Doubt left hip Cellulitis- redness and bruising associated with hematoma- which is apparent on clinical exam as well as on CT  · S/P left LESLIE for AVN 21  · SIRS/Leucocytosis-  Likely multifactorial- reactive to bleeding + on IV  Steroid on board. Doubt sepsis  · Anemia 2/2 Hematoma-post op  · , CRP -elevated to 134 and 10 respectively- .  Multiple issues can contribute\  · Scrotal swelling without erythema/cellulitis     Other Medical issues going on:  · PAF  · Hx of acute pul edema  · HTN  · CKD  · Recent COPD exacerbation- dc 4/14  · Chronic resp failure, on home O2  · CKD     Past ID history:  Past ID  · Doxy for 10 days on d/c 4/14/21 for COPD exacerbation      BCX no growth so far    SIRS due to post op hematoma- no apparent infection      Will sign off. Please call if questions. Microbiology:    Blood culture:   4/18= NGSF     Urine culture:N/A     Respiratory culture:N/A        Body Fluid/ CSF/ wound/drainage culture:  4/18: pending gram stain, culture     Cath tip/ PICC culture: N/A      Lines / Catheters: right arm PIV          Patient Active Problem List   Diagnosis Code    Hypertension I10    COPD (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 ([facility]) J44.9    Chronic renal disease, stage 3, moderately decreased glomerular filtration rate between 30-59 mL/min/1.73 square meter ([facility]) N18.30    Acute exacerbation of chronic obstructive pulmonary disease (COPD) ([facility] [address] J44.1    Debility R53.81    Chronic respiratory failure with hypoxia ([facility]) J96.11    Tobacco dependence F17.200    Left hip pain M25.552    PAF (paroxysmal atrial fibrillation) ([facility]) I48.0    Avascular necrosis of bone of left hip ([facility]) M87.052    Acute respiratory failure with hypoxia and hypercapnia ([facility]) J96.01, J96.02    Obesity E66.9    Acute pulmonary edema ([facility]) J81.0    Anemia D64.9    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 02XA       Current Facility-Administered Medications   Medication Dose Route Frequency    albumin human 25% (BUMINATE) solution 25 g  25 g IntraVENous Q6H    furosemide (LASIX) injection 40 mg  40 mg IntraVENous Q12H    budesonide (PULMICORT) 500 mcg/2 ml nebulizer suspension  500 mcg Nebulization BID RT    arformoteroL (BROVANA) neb solution 15 mcg  15 mcg Nebulization BID RT    0.9% sodium chloride infusion 250 mL  250 mL IntraVENous PRN    cefepime (MAXIPIME) 2 g in sterile water (preservative free) 10 mL IV syringe  2 g IntraVENous Q12H    acetaminophen (TYLENOL) tablet 650 mg  650 mg Oral Q4H PRN    albuterol-ipratropium (DUO-NEB) 2.5 MG-0.5 MG/3 ML  3 mL Nebulization Q4H PRN    amLODIPine (NORVASC) tablet 5 mg  5 mg Oral DAILY    dilTIAZem IR (CARDIZEM) tablet 30 mg  30 mg Oral TIDAC    [Held by provider] enoxaparin (LOVENOX) injection 40 mg  40 mg SubCUTAneous DAILY    ferrous sulfate tablet 325 mg  325 mg Oral BID WITH MEALS    famotidine (PEPCID) tablet 20 mg  20 mg Oral DAILY    tamsulosin (FLOMAX) capsule 0.4 mg  0.4 mg Oral QPM    methylPREDNISolone (PF) (SOLU-MEDROL) injection 40 mg  40 mg IntraVENous Q12H    sodium chloride (NS) flush 5-40 mL  5-40 mL IntraVENous Q8H    sodium chloride (NS) flush 5-40 mL  5-40 mL IntraVENous PRN    acetaminophen (TYLENOL) tablet 650 mg  650 mg Oral Q6H PRN    Or    acetaminophen (TYLENOL) suppository 650 mg  650 mg Rectal Q6H PRN    polyethylene glycol (MIRALAX) packet 17 g  17 g Oral DAILY PRN    promethazine (PHENERGAN) tablet 12.5 mg  12.5 mg Oral Q6H PRN    Or    ondansetron (ZOFRAN) injection 4 mg  4 mg IntraVENous Q6H PRN               Objective:    Visit Vitals  BP (!) 114/51 (BP 1 Location: Right upper arm, BP Patient Position: At rest)   Pulse 78   Temp 98 °F (36.7 °C)   Resp 18   Ht 5' 8\" (1.727 m)   Wt 105.6 kg (232 lb 11.2 oz)   SpO2 98%   BMI 35.38 kg/m²       Temp (24hrs), Av.1 °F (36.7 °C), Min:98 °F (36.7 °C), Max:98.5 °F (36.9 °C)     GEN: WDWN, not in resp distress. On O2 support  HEENT: Unicteric. EOMI intact  CHEST: Non laboured breathing  ABD: Obese/soft. Non tender. AMANDA: Deferred  Skin: Dry and intact. Hematoma left hip  CNS: A, OX3. Moves all extremity. CN grossly ok.              Lab results:    Chemistry  Recent Labs     21  0410 21  0235 21  1517   * 97 125*    136 136   K 4.0 3.7 3.7    100 98*   CO2 30 30 29   BUN 38* 36* 43*   CREA 1.19 1.18 1.35*   CA 6.7* 6.7* 6.4*   AGAP 7 6 9   BUCR 32* 31* 32*   AP 62 72 78   TP 4.9* 4.9* 5.4*   ALB 2.4* 2.4* 2.4*   GLOB 2.5 2.5 3.0   AGRAT 1.0 1.0 0.8       CBC w/ Diff  Recent Labs     21  0410 21  0235 21  1517   WBC 10.1 13.2 19.7*   RBC 2.41* 2.24* 2.60*   HGB 7.2* 6.7* 7.8*   HCT 22.2* 20.7* 24.2*    239 288   GRANS 92* 91* 84*   LYMPH 3* 2* 5*   EOS 0 1 0         Imagin/18: CT abd/pelvis with contrast     No evidence of Susana's gangrene. Extensive stranding of the subcutaneous  tissues of the hips and thighs bilaterally superficial fluid suggesting  cellulitis and/or edema. Correlate with clinical findings to exclude compartment  syndrome.     There is a left hip arthroplasty. There is high density focus seen anterior to  the left femur at the level of the arthroplasty suggesting a postoperative  hematoma. Hematoma is more prominent than on prior exam. Small dot of gas is  seen adjacent to the left hip joint which may be postoperative, however If there  is any clinical concern for septic arthritis then I would advise aspiration the  hip joint.     Loculated pleural fluid collection in the right hemithorax with calcification  the parietal and visceral pleura unchanged.     Right adrenal adenoma.     Bilateral fairly large hydroceles.     Total duration of time spent with patient interview and exam and discussion of plan of care, review of chart in care everywhere, discussion with medical staff- nursing/attending and additional specialities as indicated: 20 minutes

## 2021-04-20 NOTE — PROGRESS NOTES
Problem: Mobility Impaired (Adult and Pediatric)  Goal: *Acute Goals and Plan of Care (Insert Text)  Description: Physical Therapy Goals   Initiated 4/19/2021 and to be accomplished within 7 day(s)  1. Patient will move from supine <> sit with SBA/S in prep for out of bed activity and change of position. 2.  Patient will perform sit<> stand with SBA/RW WBAT L LE in prep for transfers/ambulation. 3.  Patient will transfer from bed <> chair with SBA/RW/WBAT LLE for time up in chair  as tolerated for completion of ADL activity. 4.  Patient will ambulate  30 feet with CGA/RW/WBAT L LE for improved functional mobility at discharge. Outcome: Progressing Towards Goal  []  Patient has met MD livier pereira for d/c home   []  Recommend HH with 24 hour adult care   [x]  Benefit from additional acute PT session to address: To address functional mobility deficits    PHYSICAL THERAPY TREATMENT    Patient: Donny Rangel (73 y.o. male)  Date: 4/20/2021  Diagnosis: Cellulitis [L03.90]  Acute hip pain [M25.559] Cellulitis  Precautions: Fall, WBAT  PLOF: pt admitted from [place] [address]. Reports not ambulating yet. ASSESSMENT:  Pt found seated EOB with LE's down. Reports no pain. States he has been sitting up for \"a couple of hours\". Again educated pt on limited time sitting EOB with LE's dangling due to BLE edema R>L. Pt performed STS transfers x3 with RW/min A additional time. Returned to supine with mod A for LE mgmt. Scrotum elevated w/towel and pillowcase d/t swelling. Pt completed LE strengthening ex as noted below and tolerated same well. Will resume GT next session. Progression toward goals:   []      Improving appropriately and progressing toward goals  [x]      Improving slowly and progressing toward goals  []      Not making progress toward goals and plan of care will be adjusted     PLAN:  Patient continues to benefit from skilled intervention to address the above impairments.   Continue treatment per established plan of care. Discharge Recommendations:  Shree Wiley  Further Equipment Recommendations for Discharge:  N/A     SUBJECTIVE:   Patient stated Not too bad.     OBJECTIVE DATA SUMMARY:   Critical Behavior:  Neurologic State: Alert  Orientation Level: Oriented X4  Cognition: Appropriate decision making, Appropriate for age attention/concentration, Appropriate safety awareness, Follows commands   Functional Mobility Training:  Bed Mobility:  Sit to Supine: Moderate assistance(for LE mgmt)  Scooting: Supervision(seated EOB)  Transfers:  Sit to Stand: Minimum assistance;Assist x1;Additional time  Stand to Sit: Minimum assistance;Assist x1;Additional time  Balance:  Sitting: Intact  Standing: Impaired; With support  Standing - Static: Fair;Constant support  Standing - Dynamic : Fair;Constant support  Therapeutic Exercises:       EXERCISE   Sets   Reps   Active Active Assist   Passive Self ROM   Comments   Ankle Pumps 1 20  [x] [] [] []    Quad Sets/Glut Sets 1 10  [x] [] [] [] Hold for 5 secs   Hamstring Sets   [] [] [] []    Short Arc Quads   [] [] [] []    Heel Slides 1 10 [x] [x] R [] []    Straight Leg Raises   [] [] [] []        Pain:  Pain level pre-treatment:4-5 /10  Pain level post-treatment:4-5 /10   Pain Intervention(s): Medication (see MAR); Rest, Ice, Repositioning  Response to intervention: Nurse notified, See doc flow  Activity Tolerance:   Fair   Please refer to the flowsheet for vital signs taken during this treatment. After treatment:   [] Patient left in no apparent distress sitting up in chair  [x] Patient left in no apparent distress in bed  [x] Call bell left within reach  [] Nursing notified  [] Caregiver present  [] Bed alarm activated  [] SCDs applied      COMMUNICATION/EDUCATION:   [x]         Role of Physical Therapy in the acute care setting. [x]         Fall prevention education was provided and the patient/caregiver indicated understanding.   [x] Patient/family have participated as able in working toward goals and plan of care. [x]         Patient/family agree to work toward stated goals and plan of care. []         Patient understands intent and goals of therapy, but is neutral about his/her participation.   []         Patient is unable to participate in stated goals/plan of care: ongoing with therapy staff.  []         Other:        Vishnu Nguyen, PT   Time Calculation: 28 mins

## 2021-04-20 NOTE — PROGRESS NOTES
Bedside and Verbal shift change report given to [address] [place] [address]  (oncoming nurse) by Sunday ADEN, RN  (offgoing nurse). Report included the following information SBAR, Kardex and MAR. SHIFT UPDATES:  Patient presented with cellulitis of left hip surgical wound after having hip replacement according to nightshift RN staff. Patient presented with 3+ pitting edema in his bilateral extremities and received scheduled IV Lasix 40 mg every 12 hours and last received a dose around 15    Pm. Patient presented on bed rest and did not ambulate and also presented with mild headache pain earlier during shift and was given Tylenol 650 mg by mouth at 934 am. Patient can receive  Tylenol 650 mg by mouth every 6 hours PRN for pain. ABNORMAL LAB:  
Results for Kami Bowers (MRN [id number]) as of 4/20/2021 15:29 Ref. Range 4/19/2021 19:10 4/20/2021 04:10  
CALLED TO: Unknown 1 UNIT READY NOTIFIED Frances Mosher RN 3S AT 2155 ON 04/19/21 BY SHERRI. WBC Latest Ref Range: 4.6 - 13.2 K/uL  10.1 RBC Latest Ref Range: 4.35 - 5.65 M/uL  2.41 (L) HGB Latest Ref Range: 13.0 - 16.0 g/dL  7.2 (L) HCT Latest Ref Range: 36.0 - 48.0 %  22.2 (L) MCV Latest Ref Range: 74.0 - 97.0 FL  92.1 MCH Latest Ref Range: 24.0 - 34.0 PG  29.9 MCHC Latest Ref Range: 31.0 - 37.0 g/dL  32.4 RDW Latest Ref Range: 11.6 - 14.5 %  14.8 (H) PLATELET Latest Ref Range: 135 - 420 K/uL  224 MPV Latest Ref Range: 9.2 - 11.8 FL  9.5 NEUTROPHILS Latest Ref Range: 40 - 73 %  92 (H) LYMPHOCYTES Latest Ref Range: 21 - 52 %  3 (L) MONOCYTES Latest Ref Range: 3 - 10 %  3 EOSINOPHILS Latest Ref Range: 0 - 5 %  0  
BASOPHILS Latest Ref Range: 0 - 2 %  0  
DF Latest Units:    AUTOMATED  
ABS. NEUTROPHILS Latest Ref Range: 1.8 - 8.0 K/UL  9.3 (H)  
ABS. LYMPHOCYTES Latest Ref Range: 0.9 - 3.6 K/UL  0.3 (L)  
ABS. MONOCYTES Latest Ref Range: 0.05 - 1.2 K/UL  0.3  
ABS. EOSINOPHILS Latest Ref Range: 0.0 - 0.4 K/UL  0.0  
ABS.  BASOPHILS Latest Ref Range: 0.0 - 0.1 K/UL  0.0 Sodium Latest Ref Range: 136 - 145 mmol/L  138 Potassium Latest Ref Range: 3.5 - 5.5 mmol/L  4.0 Chloride Latest Ref Range: 100 - 111 mmol/L  101 CO2 Latest Ref Range: 21 - 32 mmol/L  30 Anion gap Latest Ref Range: 3.0 - 18 mmol/L  7 Glucose Latest Ref Range: 74 - 99 mg/dL  162 (H) BUN Latest Ref Range: 7.0 - 18 MG/DL  38 (H) Creatinine Latest Ref Range: 0.6 - 1.3 MG/DL  1.19  
BUN/Creatinine ratio Latest Ref Range: 12 - 20    32 (H) Calcium Latest Ref Range: 8.5 - 10.1 MG/DL  6.7 (L) Magnesium Latest Ref Range: 1.6 - 2.6 mg/dL  2.1 GFR est non-AA Latest Ref Range: >60 ml/min/1.73m2  59 (L) GFR est AA Latest Ref Range: >60 ml/min/1.73m2  >60 Bilirubin, total Latest Ref Range: 0.2 - 1.0 MG/DL  0.9 Protein, total Latest Ref Range: 6.4 - 8.2 g/dL  4.9 (L) Albumin Latest Ref Range: 3.4 - 5.0 g/dL  2.4 (L) Globulin Latest Ref Range: 2.0 - 4.0 g/dL  2.5 A-G Ratio Latest Ref Range: 0.8 - 1.7    1.0 ALT Latest Ref Range: 16 - 61 U/L  22 AST Latest Ref Range: 10 - 38 U/L  14 Alk. phosphatase Latest Ref Range: 45 - 117 U/L  62 Procalcitonin Latest Units: ng/mL  <0.05 Crossmatch Expiration Unknown 04/22/2021,2359 Unit number Unknown [id number] Unit division Unknown 00 Status of unit Unknown TRANSFUSED Crossmatch result Unknown Compatible TYPE & SCREEN Unknown Rpt Wounds? Left Hip surgical wound (Mepilex intact. Managed by surgery team) & Skin tear (Mepilex dressing intact). Central Lines? None. Last BM:  04/20/2021 Pending Labs for AM Draw: CBC with diff, BMP & CMP levels at 4 am on 4/21/2021. Discharge plan: According to case management on 4/19/2021 at 940 am states that patient will discharge to skilled nursing facility once medically stable.  Nightshift RN staff will be notified to inform Dayshift RN staff on 4/21/2021 to inquire from case management staff patient's specific skilled nursing facility for discharge. Shital Matson 4/20/2021

## 2021-04-20 NOTE — PROGRESS NOTES
Hospitalist Progress Note    Patient: Kingsley Hinson MRN: [id number]  CSN: [id number]    YOB: 1945  Age: 66 y.o. Sex: male    DOA: 4/18/2021 LOS:  LOS: 2 days                Assessment/Plan     Patient Active Problem List   Diagnosis Code    Hypertension I10    COPD (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 ([facility]) J44.9    Chronic renal disease, stage 3, moderately decreased glomerular filtration rate between 30-59 mL/min/1.73 square meter ([facility]) N18.30    Acute exacerbation of chronic obstructive pulmonary disease (COPD) ([facility] [address] J44.1    Debility R53.81    Chronic respiratory failure with hypoxia ([facility]) J96.11    Tobacco dependence F17.200    Left hip pain M25.552    PAF (paroxysmal atrial fibrillation) ([facility]) I48.0    Avascular necrosis of bone of left hip ([facility]) M87.052    Acute respiratory failure with hypoxia and hypercapnia ([facility]) J96.01, J96.02    Obesity E66.9    Acute pulmonary edema ([facility]) J81.0    Anemia D64.9    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 0XA            66 y.o.  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 Very hard of hearing      Left hip pain/Hematoma -  Recent left total hip arthroplasty for avascular necrosis of the left femoral head with collapse. CT scan of abdomen and pelvis showed Extensive stranding of the subcutaneous  tissues of the hips and thighs bilaterally superficial fluid suggesting  cellulitis and/or edema. There is high density focus seen anterior to the left femur at the level of the arthroplasty suggesting a postoperative hematoma. Hematoma is more prominent than on prior exam.  Discussed with Dr. Dilan Hilton (ID).  Do not think this is infection. Skin changes are ecchymosis/hematoma. Recommended stopping vancomycin. procalcitonin <0.05, will also discontinue cefepime. Scrotal edema/Hydrocele -  improving  on lasix  Continue with IV albumin    Chronic respiratory failure -  On home oxygen by NC.    COPD -  Continue with steroids, neb treatment as needed. PAF -  On cardizem    CKD -  Stage 3, monitor renal function. HTN -  Controlled, continue meds, monitor BP. Anemia -  S/p Blood transfusion   Secondary to acute blood loss. Monitor H/H.    DVT prophylaxis will stop lovenox due to hematoma and anemia. Disposition : 1-2 days    Review of systems  General: No fevers or chills. Cardiovascular: No chest pain or pressure. No palpitations. Pulmonary: No shortness of breath. Gastrointestinal: No nausea, vomiting. Physical Exam:  General: Awake, cooperative, no acute distress    HEENT: NC, Atraumatic. PERRLA, anicteric sclerae. Lungs: CTA Bilaterally. No Wheezing/Rhonchi/Rales. Heart:  S1 S2,  No murmur, No Rubs, No Gallops  Abdomen: Soft, Non distended, Non tender.  +Bowel sounds, scrotal edema  Extremities: Left significant hip bruising also involving all of posterior thigh and buttock. Psych:   Not anxious or agitated. Neurologic:  No acute neurological deficit. Vital signs/Intake and Output:  Visit Vitals  BP (!) 127/54 (BP 1 Location: Left upper arm, BP Patient Position: Sitting)   Pulse 94   Temp 97.5 °F (36.4 °C)   Resp 22   Ht 5' 8\" (1.727 m)   Wt 105.6 kg (232 lb 11.2 oz)   SpO2 99%   BMI 35.38 kg/m²     Current Shift:  No intake/output data recorded.   Last three shifts:  04/18 1901 - 04/20 0700  In: 1329.2 [I.V.:1000]  Out: 1900 [Urine:1900]            Labs: Results:       Chemistry Recent Labs     04/20/21  0410 04/19/21  0235 04/18/21  1517   * 97 125*    136 136   K 4.0 3.7 3.7    100 98*   CO2 30 30 29   BUN 38* 36* 43*   CREA 1.19 1.18 1.35*   CA 6.7* 6.7* 6.4*   AGAP 7 6 9 BUCR 32* 31* 32*   AP 62 72 78   TP 4.9* 4.9* 5.4*   ALB 2.4* 2.4* 2.4*   GLOB 2.5 2.5 3.0   AGRAT 1.0 1.0 0.8      CBC w/Diff Recent Labs     04/20/21  0410 04/19/21  0235 04/18/21  1517   WBC 10.1 13.2 19.7*   RBC 2.41* 2.24* 2.60*   HGB 7.2* 6.7* 7.8*   HCT 22.2* 20.7* 24.2*    239 288   GRANS 92* 91* 84*   LYMPH 3* 2* 5*   EOS 0 1 0      Cardiac Enzymes No results for input(s): CPK, CKND1, WILLARD in the last 72 hours. No lab exists for component: CKRMB, TROIP   Coagulation Recent Labs     04/18/21  1517   PTP 13.7   INR 1.1   APTT 32.4       Lipid Panel No results found for: CHOL, CHOLPOCT, CHOLX, CHLST, CHOLV, 852480, HDL, HDLP, LDL, LDLC, DLDLP, 356853, VLDLC, VLDL, TGLX, TRIGL, TRIGP, TGLPOCT, CHHD, CHHDX   BNP No results for input(s): BNPP in the last 72 hours.    Liver Enzymes Recent Labs     04/20/21  0410   TP 4.9*   ALB 2.4*   AP 62      Thyroid Studies Lab Results   Component Value Date/Time    TSH 3.06 04/05/2021 01:30 PM        Procedures/imaging: see electronic medical records for all procedures/Xrays and details which were not copied into this note but were reviewed prior to creation of Plan

## 2021-04-20 NOTE — PROGRESS NOTES
Physician Progress Note      Carmen Briones  CSN #:                  [id number]  :                       1945  ADMIT DATE:       2021 2:57 PM  [address] [place] DATE:  RESPONDING  PROVIDER #:        Rashaun Alvarez MD          QUERY TEXT:    Pt admitted with left hip pain . Pt noted to have postoperative hematoma. If possible, please document in progress notes and discharge summary:    The medical record reflects the following:  Risk Factors: S/P hip replacement on   Clinical Indicators: Per Ortho -He is s/p a left LESLIE +/- . Porter Shawjustin He developed a hematoma and marked swelling of the proximal thigh. Radiographs: CT scan . .+ hematoma. . no joint regional fluid. .  Treatment: CT scan, Ortho consult, ID consult    Thank You  Xena Concepcion RN [facility] [phone]  Options provided:  -- Hematoma  as a postoperative complication  -- Hematoma  as an expected/inherent condition that occurred postoperatively and not a complication  -- Hematoma related to other incidental risk factor (please specify) occurring following surgery and not a complication, Please document incidental risk factor. -- Other - I will add my own diagnosis  -- Disagree - Not applicable / Not valid  -- Disagree - Clinically unable to determine / Unknown  -- Refer to Clinical Documentation Reviewer    PROVIDER RESPONSE TEXT:    Patient has Hematoma as a postoperative complication.     Query created by: Shant Webster on 2021 5:31 PM      Electronically signed by:  Rashaun Alvarez MD 2021 5:37 PM

## 2021-04-20 NOTE — PROGRESS NOTES
Patient presents compliant to current plan of care as directed.    Ron Castellon  4/20/2021  12:58 PM

## 2021-04-21 LAB
ALBUMIN SERPL-MCNC: 3.4 G/DL (ref 3.4–5)
ALBUMIN/GLOB SERPL: 1.5 {RATIO} (ref 0.8–1.7)
ALP SERPL-CCNC: 62 U/L (ref 45–117)
ALT SERPL-CCNC: 28 U/L (ref 16–61)
ANION GAP SERPL CALC-SCNC: 6 MMOL/L (ref 3–18)
AST SERPL-CCNC: 18 U/L (ref 10–38)
BACTERIA SPEC CULT: ABNORMAL
BASOPHILS # BLD: 0 K/UL (ref 0–0.1)
BASOPHILS NFR BLD: 0 % (ref 0–2)
BILIRUB SERPL-MCNC: 0.8 MG/DL (ref 0.2–1)
BUN SERPL-MCNC: 41 MG/DL (ref 7–18)
BUN/CREAT SERPL: 33 (ref 12–20)
CALCIUM SERPL-MCNC: 7.1 MG/DL (ref 8.5–10.1)
CHLORIDE SERPL-SCNC: 102 MMOL/L (ref 100–111)
CO2 SERPL-SCNC: 30 MMOL/L (ref 21–32)
CREAT SERPL-MCNC: 1.25 MG/DL (ref 0.6–1.3)
DIFFERENTIAL METHOD BLD: ABNORMAL
EOSINOPHIL # BLD: 0 K/UL (ref 0–0.4)
EOSINOPHIL NFR BLD: 0 % (ref 0–5)
ERYTHROCYTE [DISTWIDTH] IN BLOOD BY AUTOMATED COUNT: 15 % (ref 11.6–14.5)
GLOBULIN SER CALC-MCNC: 2.3 G/DL (ref 2–4)
GLUCOSE SERPL-MCNC: 154 MG/DL (ref 74–99)
GRAM STN SPEC: ABNORMAL
GRAM STN SPEC: ABNORMAL
HCT VFR BLD AUTO: 21.2 % (ref 36–48)
HGB BLD-MCNC: 7 G/DL (ref 13–16)
LYMPHOCYTES # BLD: 0.6 K/UL (ref 0.9–3.6)
LYMPHOCYTES NFR BLD: 6 % (ref 21–52)
MAGNESIUM SERPL-MCNC: 1.9 MG/DL (ref 1.6–2.6)
MCH RBC QN AUTO: 30.2 PG (ref 24–34)
MCHC RBC AUTO-ENTMCNC: 33 G/DL (ref 31–37)
MCV RBC AUTO: 91.4 FL (ref 74–97)
METAMYELOCYTES NFR BLD MANUAL: 1 %
MONOCYTES # BLD: 0.2 K/UL (ref 0.05–1.2)
MONOCYTES NFR BLD: 2 % (ref 3–10)
NEUTS BAND NFR BLD MANUAL: 3 % (ref 0–5)
NEUTS SEG # BLD: 8.6 K/UL (ref 1.8–8)
NEUTS SEG NFR BLD: 88 % (ref 40–73)
PLATELET # BLD AUTO: 201 K/UL (ref 135–420)
PMV BLD AUTO: 9.3 FL (ref 9.2–11.8)
POTASSIUM SERPL-SCNC: 3.7 MMOL/L (ref 3.5–5.5)
PROT SERPL-MCNC: 5.7 G/DL (ref 6.4–8.2)
RBC # BLD AUTO: 2.32 M/UL (ref 4.35–5.65)
RBC MORPH BLD: ABNORMAL
SERVICE CMNT-IMP: ABNORMAL
SODIUM SERPL-SCNC: 138 MMOL/L (ref 136–145)
WBC # BLD AUTO: 9.5 K/UL (ref 4.6–13.2)
WBC MORPH BLD: ABNORMAL

## 2021-04-21 PROCEDURE — 97110 THERAPEUTIC EXERCISES: CPT

## 2021-04-21 PROCEDURE — 65270000029 HC RM PRIVATE

## 2021-04-21 PROCEDURE — 80053 COMPREHEN METABOLIC PANEL: CPT

## 2021-04-21 PROCEDURE — 74011250637 HC RX REV CODE- 250/637: Performed by: INTERNAL MEDICINE

## 2021-04-21 PROCEDURE — 85025 COMPLETE CBC W/AUTO DIFF WBC: CPT

## 2021-04-21 PROCEDURE — 36415 COLL VENOUS BLD VENIPUNCTURE: CPT

## 2021-04-21 PROCEDURE — 77010033678 HC OXYGEN DAILY

## 2021-04-21 PROCEDURE — P9047 ALBUMIN (HUMAN), 25%, 50ML: HCPCS | Performed by: INTERNAL MEDICINE

## 2021-04-21 PROCEDURE — 74011000250 HC RX REV CODE- 250: Performed by: INTERNAL MEDICINE

## 2021-04-21 PROCEDURE — 74011250636 HC RX REV CODE- 250/636: Performed by: INTERNAL MEDICINE

## 2021-04-21 PROCEDURE — 97116 GAIT TRAINING THERAPY: CPT

## 2021-04-21 PROCEDURE — 94640 AIRWAY INHALATION TREATMENT: CPT

## 2021-04-21 PROCEDURE — 83735 ASSAY OF MAGNESIUM: CPT

## 2021-04-21 RX ADMIN — Medication 10 ML: at 14:05

## 2021-04-21 RX ADMIN — Medication 10 ML: at 21:22

## 2021-04-21 RX ADMIN — METHYLPREDNISOLONE SODIUM SUCCINATE 40 MG: 40 INJECTION, POWDER, FOR SOLUTION INTRAMUSCULAR; INTRAVENOUS at 10:16

## 2021-04-21 RX ADMIN — FERROUS SULFATE TAB 325 MG (65 MG ELEMENTAL FE) 325 MG: 325 (65 FE) TAB at 10:16

## 2021-04-21 RX ADMIN — TAMSULOSIN HYDROCHLORIDE 0.4 MG: 0.4 CAPSULE ORAL at 18:01

## 2021-04-21 RX ADMIN — IPRATROPIUM BROMIDE AND ALBUTEROL SULFATE 3 ML: .5; 3 SOLUTION RESPIRATORY (INHALATION) at 02:45

## 2021-04-21 RX ADMIN — ARFORMOTEROL TARTRATE 15 MCG: 15 SOLUTION RESPIRATORY (INHALATION) at 21:06

## 2021-04-21 RX ADMIN — Medication 10 ML: at 05:20

## 2021-04-21 RX ADMIN — ACETAMINOPHEN 650 MG: 325 TABLET ORAL at 21:29

## 2021-04-21 RX ADMIN — ALBUMIN (HUMAN) 25 G: 0.25 INJECTION, SOLUTION INTRAVENOUS at 10:16

## 2021-04-21 RX ADMIN — IPRATROPIUM BROMIDE AND ALBUTEROL SULFATE 3 ML: .5; 3 SOLUTION RESPIRATORY (INHALATION) at 07:14

## 2021-04-21 RX ADMIN — METHYLPREDNISOLONE SODIUM SUCCINATE 40 MG: 40 INJECTION, POWDER, FOR SOLUTION INTRAMUSCULAR; INTRAVENOUS at 21:22

## 2021-04-21 RX ADMIN — BUDESONIDE 500 MCG: 0.5 INHALANT RESPIRATORY (INHALATION) at 21:06

## 2021-04-21 RX ADMIN — ALBUMIN (HUMAN) 25 G: 0.25 INJECTION, SOLUTION INTRAVENOUS at 13:59

## 2021-04-21 RX ADMIN — FERROUS SULFATE TAB 325 MG (65 MG ELEMENTAL FE) 325 MG: 325 (65 FE) TAB at 18:01

## 2021-04-21 RX ADMIN — IPRATROPIUM BROMIDE AND ALBUTEROL SULFATE 3 ML: .5; 3 SOLUTION RESPIRATORY (INHALATION) at 15:45

## 2021-04-21 RX ADMIN — IPRATROPIUM BROMIDE AND ALBUTEROL SULFATE 3 ML: .5; 3 SOLUTION RESPIRATORY (INHALATION) at 11:29

## 2021-04-21 RX ADMIN — ARFORMOTEROL TARTRATE 15 MCG: 15 SOLUTION RESPIRATORY (INHALATION) at 07:14

## 2021-04-21 RX ADMIN — FUROSEMIDE 40 MG: 10 INJECTION INTRAMUSCULAR; INTRAVENOUS at 01:30

## 2021-04-21 RX ADMIN — FAMOTIDINE 20 MG: 20 TABLET, FILM COATED ORAL at 10:17

## 2021-04-21 RX ADMIN — AMLODIPINE BESYLATE 5 MG: 5 TABLET ORAL at 10:16

## 2021-04-21 RX ADMIN — ALBUMIN (HUMAN) 25 G: 0.25 INJECTION, SOLUTION INTRAVENOUS at 01:29

## 2021-04-21 RX ADMIN — FUROSEMIDE 40 MG: 10 INJECTION INTRAMUSCULAR; INTRAVENOUS at 13:59

## 2021-04-21 RX ADMIN — ALBUMIN (HUMAN) 25 G: 0.25 INJECTION, SOLUTION INTRAVENOUS at 21:23

## 2021-04-21 RX ADMIN — BUDESONIDE 500 MCG: 0.5 INHALANT RESPIRATORY (INHALATION) at 07:14

## 2021-04-21 NOTE — PROGRESS NOTES
D/C Plan:    [address] [place] [address] SNF is willing to accept pt. Facility needs proof of COVID vaccines. Spouse will bring vaccination record today. CMS will update [address] [place] [address] and [facility]. Pt will transport via medical transportation. CM continuing to follow for d/c needs. Transition of Care:    Noted pt was recently discharged and was transferred to [place] [address] (4/14/21). CM contacted [facility] and they have confirmed pt was transferred from their facility and they are able to accept pt back when medically stable. CM attempted to contact pt's wife, Kale Chaudhary ([phone]), and left a message requesting a return call. CM to continue to follow and   assist with transition of care.       CM spoke with pt's wife to discuss transition of care. Pt/family would prefer to not return to [place] [address] and would like to have clinical information sent to [address] [place] [address] to see if they are able to accept this pt.   CMS has been notified to assist.  CM to continue to follow and assist.

## 2021-04-21 NOTE — PROGRESS NOTES
4/21/2021  PT treatment not completed due to:  [] Off Unit for testing/procedure  [] Pain  [x] Eating  [] Patient too lethargic  [] Nausea/vomiting  [] Dialysis treatment in progress   [x] Other: pt states \"my breathing is still not right\". Pt seated EOB eating. Will f/up tomorrow accordingly.   Kourtney Mora, PT

## 2021-04-21 NOTE — PROGRESS NOTES
Hospitalist Progress Note    Patient: Hollis Lazo MRN: [id number]  CSN: [id number]    YOB: 1945  Age: 66 y.o. Sex: male    DOA: 4/18/2021 LOS:  LOS: 3 days                Assessment/Plan     Patient Active Problem List   Diagnosis Code    Hypertension I10    COPD (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 ([facility]) J44.9    Chronic renal disease, stage 3, moderately decreased glomerular filtration rate between 30-59 mL/min/1.73 square meter ([facility]) N18.30    [facility] of [facility] J44.1    Debility R53.81    Chronic respiratory failure with hypoxia ([facility]) J96.11    Tobacco dependence F17.200    Left hip pain M25.552    PAF (paroxysmal atrial fibrillation) ([facility]) I48.0    Avascular necrosis of bone of left hip ([facility]) M87.052    Acute respiratory failure with hypoxia and hypercapnia ([facility]) J96.01, J96.02    Obesity E66.9    Acute pulmonary edema ([facility]) J81.0    Anemia D64.9    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 0XA            66 y.o.  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 Very hard of hearing      Left hip pain/Hematoma -  Recent left total hip arthroplasty for avascular necrosis of the left femoral head with collapse. CT scan of abdomen and pelvis showed Extensive stranding of the subcutaneous  tissues of the hips and thighs bilaterally superficial fluid suggesting  cellulitis and/or edema. There is high density focus seen anterior to the left femur at the level of the arthroplasty suggesting a postoperative hematoma. Hematoma is more prominent than on prior exam.  Discussed with Dr. Brandy Banks (ID).  Do not think this is infection. Skin changes are ecchymosis/hematoma. Recommended stopping vancomycin. procalcitonin <0.05, will also discontinue cefepime. Scrotal edema/Hydrocele -  improving  on lasix  Continue with IV albumin    Chronic respiratory failure -  On home oxygen by NC.    COPD -  Continue with steroids, neb treatment as needed. PAF -  On cardizem    CKD -  Stage 3, monitor renal function. HTN -  Controlled, continue meds, monitor BP. Anemia -  S/p Blood transfusion   Secondary to acute blood loss. Monitor H/H.    DVT prophylaxis will stop lovenox due to hematoma and anemia. Disposition : 1-2 days    Review of systems  General: No fevers or chills. Cardiovascular: No chest pain or pressure. No palpitations. Pulmonary: No shortness of breath. Gastrointestinal: No nausea, vomiting. Physical Exam:  General: Awake, cooperative, no acute distress    HEENT: NC, Atraumatic. PERRLA, anicteric sclerae. Lungs: CTA Bilaterally. No Wheezing/Rhonchi/Rales. Heart:  S1 S2,  No murmur, No Rubs, No Gallops  Abdomen: Soft, Non distended, Non tender.  +Bowel sounds, scrotal edema  Extremities: Left significant hip bruising also involving all of posterior thigh and buttock. Psych:   Not anxious or agitated. Neurologic:  No acute neurological deficit.            Vital signs/Intake and Output:  Visit Vitals  BP (!) 139/58 (BP 1 Location: Left upper arm, BP Patient Position: At rest)   Pulse 96   Temp 98.4 °F (36.9 °C)   Resp 22   Ht 5' 7.99\" (1.727 m)   Wt 106.7 kg (235 lb 4.8 oz)   SpO2 99%   BMI 35.79 kg/m²     Current Shift:  04/21 0701 - 04/21 1900  In: 240 [P.O.:240]  Out: -   Last three shifts:  04/19 1901 - 04/21 0700  In: 1049.2 [P.O.:720]  Out: 2300 [Urine:2300]            Labs: Results:       Chemistry Recent Labs     04/21/21  0220 04/20/21  0410 04/19/21  0235   * 162* 97    138 136   K 3.7 4.0 3.7    101 100   CO2 30 30 30   BUN 41* 38* 36*   CREA 1.25 1.19 1.18   CA 7.1* 6.7* 6.7*   AGAP 6 7 6   BUCR 33* 32* 31*   AP 62 62 72   TP 5.7* 4.9* 4.9*   ALB 3.4 2.4* 2.4*   GLOB 2.3 2.5 2.5   AGRAT 1.5 1.0 1.0      CBC w/Diff Recent Labs     04/21/21  0220 04/20/21  0410 04/19/21  0235   WBC 9.5 10.1 13.2   RBC 2.32* 2.41* 2.24*   HGB 7.0* 7.2* 6.7*   HCT 21.2* 22.2* 20.7*    224 239   GRANS 88* 92* 91*   LYMPH 6* 3* 2*   EOS 0 0 1      Cardiac Enzymes No results for input(s): CPK, CKND1, WILLARD in the last 72 hours. No lab exists for component: CKRMB, TROIP   Coagulation No results for input(s): PTP, INR, APTT, INREXT, INREXT in the last 72 hours. Lipid Panel No results found for: CHOL, CHOLPOCT, CHOLX, CHLST, CHOLV, 868664, HDL, HDLP, LDL, LDLC, DLDLP, 262339, VLDLC, VLDL, TGLX, TRIGL, TRIGP, TGLPOCT, CHHD, CHHDX   BNP No results for input(s): BNPP in the last 72 hours.    Liver Enzymes Recent Labs     04/21/21  0220   TP 5.7*   ALB 3.4   AP 62      Thyroid Studies Lab Results   Component Value Date/Time    TSH 3.06 04/05/2021 01:30 PM        Procedures/imaging: see electronic medical records for all procedures/Xrays and details which were not copied into this note but were reviewed prior to creation of Plan

## 2021-04-21 NOTE — PROGRESS NOTES
Bedside and Verbal shift change report given to [address] (oncoming nurse) by PAUL Castellon (offgoing nurse). Report included the following information SBAR, Kardex, Intake/Output, MAR and Recent Results. Shift Summary-   Patient Vitals for the past 12 hrs:   Temp Pulse Resp BP SpO2   04/21/21 0500 97.9 °F (36.6 °C) 85 20 (!) 119/57 98 %   04/21/21 0014 97.4 °F (36.3 °C) (!) 101 20 101/67 97 %   04/20/21 2028 97.6 °F (36.4 °C) 63 23 (!) 132/57 100 %    Pt had uneventful shift    Bedside and Verbal shift change report given to PAUL Castellon (oncoming nurse) by [address] (offgoing nurse). Report included the following information SBAR, Kardex, Intake/Output, MAR and Recent Results.

## 2021-04-21 NOTE — NURSE NAVIGATOR
Rounded on patient to reviewed education:    Eating for healing  Using Incentive Spirometer  Doing ankle pumps  Elevating both legs with toes higher then heart level. Patient given opportunity to ask questions. No problems identified. Left with call light in reach.

## 2021-04-21 NOTE — PROGRESS NOTES
04/21/21 0805 04/21/21 0900 04/21/21 1151   Vital Signs   Temp 98 °F (36.7 °C)  --  98.9 °F (37.2 °C)   Temp Source Oral  --  Oral   Pulse (Heart Rate) (!) 116  --  97   Heart Rate Source Monitor  --  Monitor   Cardiac Rhythm A Fib  --   --    Resp Rate 20  --  22   O2 Sat (%) 100 %  --  100 %   Level of Consciousness Alert (0)  --  Alert (0)   /72  --  (!) 120/51   MAP (Calculated) 89  --  74   BP 1 Method Automatic  --  Automatic   BP 1 Location Left arm  --  Left arm   BP Patient Position At rest  --  At rest   MEWS Score 3  --  2   Pain 1   Pain Scale 1 Numeric (0 - 10) Numeric (0 - 10) Numeric (0 - 10)   Pain Intensity 1 0 0 0   Patient Stated Pain Goal 0  --  0       S: Patient's management of tachycardia indicative of atrial fibrillation history & elevated MEWS score/ Reassessment of MEWs score and vital signs after intervention. B: At 0805 am, patient presented with the following vital signs listed above and a MEWS score of 3. Patient was given scheduled Amlodipine 5 mg by mouth as directed for management of patient's atrial fibrillation history around 1016 Am. Patient was informed that vital signs would be reassessed 1-2 hours after intervention and verbalized understanding. A: At 1151 am patient presented awake, alert and responsive with the most recent set of vital signs listed above and a MEWs score of 2. R: will continue with prescribed plan of care as directed.    Ron Castellon  4/21/2021  12:21 PM

## 2021-04-21 NOTE — PROGRESS NOTES
Problem: Mobility Impaired (Adult and Pediatric)  Goal: *Acute Goals and Plan of Care (Insert Text)  Description: Physical Therapy Goals   Initiated 4/19/2021 and to be accomplished within 7 day(s)  1. Patient will move from supine <> sit with SBA/S in prep for out of bed activity and change of position. 2.  Patient will perform sit<> stand with SBA/RW WBAT L LE in prep for transfers/ambulation. 3.  Patient will transfer from bed <> chair with SBA/RW/WBAT LLE for time up in chair  as tolerated for completion of ADL activity. 4.  Patient will ambulate  30 feet with CGA/RW/WBAT L LE for improved functional mobility at discharge. Outcome: Progressing Towards Goal    []  Patient has met MD livier pereira for d/c home   []  Recommend HH with 24 hour adult care   [x]  Benefit from additional acute PT session to address:      PHYSICAL THERAPY TREATMENT    Patient: Denise Bueno (73 y.o. male)  Date: 4/21/2021  Diagnosis: Cellulitis [L03.90]  Acute hip pain [M25.559] Cellulitis  Precautions: Fall, WBAT  PLOF: as previously noted    ASSESSMENT:  Pt seated EOB. Reminded of limitation of time EOB with LE's down and pt acknowledged understanding but reports ability to breath better sitting up. Completed strengthening/ROM ex BLE's as noted below. Able to  perform LAQ w/o assist L LE today (<full extension). GT with RW/min/CGA along side of bed with slow, step to gt. SOB post activity,though SpO2 100% (long h/o COPD). Assisted back to supine, HOB elevated and LE's elevated to address continued edema L >R LE. Overall improving mobility independence, however, still rec SNF at discharge. Progression toward goals:   []      Improving appropriately and progressing toward goals  [x]      Improving slowly and progressing toward goals  []      Not making progress toward goals and plan of care will be adjusted     PLAN:  Patient continues to benefit from skilled intervention to address the above impairments. Continue treatment per established plan of care. Discharge Recommendations:  Skilled Nursing Facility  Further Equipment Recommendations for Discharge:  N/a     SUBJECTIVE:   Patient stated I don't have any pain.     OBJECTIVE DATA SUMMARY:   Critical Behavior:  Neurologic State: Alert  Orientation Level: Oriented X4  Cognition: Follows commands  Functional Mobility Training:  Bed Mobility:  Rolling: Minimum assistance  Sit to Supine: Moderate assistance(LE's)  Scooting: Other (comment)([facility] bed for moving up in bed)  Transfers:  Sit to Stand: Minimum assistance;Contact guard assistance  Stand to Sit: Minimum assistance;Contact guard assistance  Balance:  Sitting: Intact  Standing: Impaired; With support  Standing - Static: Fair;Constant support  Standing - Dynamic : Fair;Constant support   Ambulation/Gait Training:  Distance (ft): 5 Feet (ft)(side steps left and right)  Assistive Device: Gait belt;Walker, rolling  Ambulation - Level of Assistance: Minimal assistance; Additional time  Gait Abnormalities: Antalgic;Decreased step clearance; Step to gait  Left Side Weight Bearing: As tolerated  Stance: Left decreased  Speed/[place]isbet: Slow  Step Length: Right shortened;Left shortened  Swing Pattern: Right asymmetrical;Left asymmetrical  Interventions: Safety awareness training;Verbal cues; Other (comment)(pacing self)  Therapeutic Exercises:     EXERCISE   Sets   Reps   Active Active Assist   Passive Self ROM   Comments   Ankle Pumps 1 20  [x] [] [] []    Straight Leg Raises   [] [] [] []    Long Arc Quads 1 10 [x] [] [] [] BLE   Seated Marching   [] [] [] []    Standing Marching   [] [] [] []       [] [] [] []      Pain:  Pain level pre-treatment: 0/10  Pain level post-treatment: 0/10   Pain Intervention(s): Medication (see MAR);  Rest, Ice, Repositioning   Response to intervention: Nurse notified, See doc flow  Activity Tolerance:   Fair-: limited by SOB  Please refer to the flowsheet for vital signs taken during this treatment. After treatment:   [] Patient left in no apparent distress sitting up in chair  [x] Patient left in no apparent distress in bed  [x] Call bell left within reach  [x] Nursing notified-Ron present  [] Caregiver present  [] Bed alarm activated  [] SCDs applied      COMMUNICATION/EDUCATION:   [x]         Role of Physical Therapy in the acute care setting. [x]         Fall prevention education was provided and the patient/caregiver indicated understanding. [x]         Patient/family have participated as able in working toward goals and plan of care. [x]         Patient/family agree to work toward stated goals and plan of care. []         Patient understands intent and goals of therapy, but is neutral about his/her participation.   []         Patient is unable to participate in stated goals/plan of care: ongoing with therapy staff.  []         Other:        Nicole Tom, PT   Time Calculation: 28 mins

## 2021-04-21 NOTE — PROGRESS NOTES
Patient presents compliant to current plan of care as directed.    Ron Castellon  4/21/2021  7:59 AM

## 2021-04-22 LAB
HCT VFR BLD AUTO: 21.7 % (ref 36–48)
HCT VFR BLD AUTO: 21.9 % (ref 36–48)
HGB BLD-MCNC: 6.8 G/DL (ref 13–16)
HGB BLD-MCNC: 7.1 G/DL (ref 13–16)
MAGNESIUM SERPL-MCNC: 1.9 MG/DL (ref 1.6–2.6)

## 2021-04-22 PROCEDURE — 65270000029 HC RM PRIVATE

## 2021-04-22 PROCEDURE — 97116 GAIT TRAINING THERAPY: CPT

## 2021-04-22 PROCEDURE — 36415 COLL VENOUS BLD VENIPUNCTURE: CPT

## 2021-04-22 PROCEDURE — P9047 ALBUMIN (HUMAN), 25%, 50ML: HCPCS | Performed by: INTERNAL MEDICINE

## 2021-04-22 PROCEDURE — 97530 THERAPEUTIC ACTIVITIES: CPT

## 2021-04-22 PROCEDURE — 83735 ASSAY OF MAGNESIUM: CPT

## 2021-04-22 PROCEDURE — 77010033678 HC OXYGEN DAILY

## 2021-04-22 PROCEDURE — 85014 HEMATOCRIT: CPT

## 2021-04-22 PROCEDURE — 74011000250 HC RX REV CODE- 250: Performed by: INTERNAL MEDICINE

## 2021-04-22 PROCEDURE — 94640 AIRWAY INHALATION TREATMENT: CPT

## 2021-04-22 PROCEDURE — 74011250636 HC RX REV CODE- 250/636: Performed by: INTERNAL MEDICINE

## 2021-04-22 PROCEDURE — 74011250637 HC RX REV CODE- 250/637: Performed by: INTERNAL MEDICINE

## 2021-04-22 RX ADMIN — IPRATROPIUM BROMIDE AND ALBUTEROL SULFATE 3 ML: .5; 3 SOLUTION RESPIRATORY (INHALATION) at 06:05

## 2021-04-22 RX ADMIN — METHYLPREDNISOLONE SODIUM SUCCINATE 40 MG: 40 INJECTION, POWDER, FOR SOLUTION INTRAMUSCULAR; INTRAVENOUS at 09:15

## 2021-04-22 RX ADMIN — Medication 10 ML: at 06:38

## 2021-04-22 RX ADMIN — FERROUS SULFATE TAB 325 MG (65 MG ELEMENTAL FE) 325 MG: 325 (65 FE) TAB at 18:11

## 2021-04-22 RX ADMIN — AMLODIPINE BESYLATE 5 MG: 5 TABLET ORAL at 09:14

## 2021-04-22 RX ADMIN — FUROSEMIDE 40 MG: 10 INJECTION INTRAMUSCULAR; INTRAVENOUS at 14:57

## 2021-04-22 RX ADMIN — TAMSULOSIN HYDROCHLORIDE 0.4 MG: 0.4 CAPSULE ORAL at 18:11

## 2021-04-22 RX ADMIN — FAMOTIDINE 20 MG: 20 TABLET, FILM COATED ORAL at 09:15

## 2021-04-22 RX ADMIN — FERROUS SULFATE TAB 325 MG (65 MG ELEMENTAL FE) 325 MG: 325 (65 FE) TAB at 09:14

## 2021-04-22 RX ADMIN — ALBUMIN (HUMAN) 25 G: 0.25 INJECTION, SOLUTION INTRAVENOUS at 22:19

## 2021-04-22 RX ADMIN — ALBUMIN (HUMAN) 25 G: 0.25 INJECTION, SOLUTION INTRAVENOUS at 09:14

## 2021-04-22 RX ADMIN — IPRATROPIUM BROMIDE AND ALBUTEROL SULFATE 3 ML: .5; 3 SOLUTION RESPIRATORY (INHALATION) at 20:03

## 2021-04-22 RX ADMIN — Medication 10 ML: at 18:13

## 2021-04-22 RX ADMIN — ALBUMIN (HUMAN) 25 G: 0.25 INJECTION, SOLUTION INTRAVENOUS at 14:57

## 2021-04-22 RX ADMIN — ALBUMIN (HUMAN) 25 G: 0.25 INJECTION, SOLUTION INTRAVENOUS at 01:24

## 2021-04-22 RX ADMIN — Medication 10 ML: at 22:19

## 2021-04-22 RX ADMIN — BUDESONIDE 500 MCG: 0.5 INHALANT RESPIRATORY (INHALATION) at 20:04

## 2021-04-22 RX ADMIN — BUDESONIDE 500 MCG: 0.5 INHALANT RESPIRATORY (INHALATION) at 07:23

## 2021-04-22 RX ADMIN — FUROSEMIDE 40 MG: 10 INJECTION INTRAMUSCULAR; INTRAVENOUS at 01:25

## 2021-04-22 RX ADMIN — ARFORMOTEROL TARTRATE 15 MCG: 15 SOLUTION RESPIRATORY (INHALATION) at 20:03

## 2021-04-22 RX ADMIN — ARFORMOTEROL TARTRATE 15 MCG: 15 SOLUTION RESPIRATORY (INHALATION) at 07:23

## 2021-04-22 RX ADMIN — METHYLPREDNISOLONE SODIUM SUCCINATE 40 MG: 40 INJECTION, POWDER, FOR SOLUTION INTRAMUSCULAR; INTRAVENOUS at 22:19

## 2021-04-22 RX ADMIN — IPRATROPIUM BROMIDE AND ALBUTEROL SULFATE 3 ML: .5; 3 SOLUTION RESPIRATORY (INHALATION) at 12:39

## 2021-04-22 NOTE — ROUTINE PROCESS
Bedside and Verbal shift change report given to CAITLIN Banks (oncoming nurse) by Lynn Dunbar (offgoing nurse). Report included the following information SBAR, Kardex and MAR.

## 2021-04-22 NOTE — PROGRESS NOTES
Problem: Mobility Impaired (Adult and Pediatric)  Goal: *Acute Goals and Plan of Care (Insert Text)  Description: Physical Therapy Goals   Initiated 4/19/2021 and to be accomplished within 7 day(s)  1. Patient will move from supine <> sit with SBA/S in prep for out of bed activity and change of position. 2.  Patient will perform sit<> stand with SBA/RW WBAT L LE in prep for transfers/ambulation. 3.  Patient will transfer from bed <> chair with SBA/RW/WBAT LLE for time up in chair  as tolerated for completion of ADL activity. 4.  Patient will ambulate  30 feet with CGA/RW/WBAT L LE for improved functional mobility at discharge. 4/22/2021  PT treatment not completed due to:  [] Off Unit for testing/procedure  [] Pain  [] Eating  [] Patient too lethargic  [] Nausea/vomiting  [] Dialysis treatment in progress   [x] Other: nursing with pt on attempt attending to IV. Will f/u at later time as pt schedule allows. Thank you.    Tawanna Wolfe, PT

## 2021-04-22 NOTE — PROGRESS NOTES
[id number] Bedside and verbal shift change report given to Marylyn Boeck, RN (on coming nurse) by Tillie Krabbe, RN (off going nurse). Report included the following information SBAR, Kardex, OR Summary, Intake/Output and MAR. Shift summary: Dressing to L hip CDI. No c/o any pain while on this shift. No s/s of distress noted. 1917 Bedside and verbal shift change report given by Marylyn Boeck, RN (off going nurse) to Camilla Lincoln RN(on coming nurse). Report included the following information SBAR, Kardex, OR Summary, Intake/Output and MAR.

## 2021-04-22 NOTE — PROGRESS NOTES
Bedside and verbal report given to LIZETH Cason RN (oncoming nurse) by Maryanne Pina RN  (off going nurse). Report included the following information SBAR, Kardex, OR Summary, Intake/Output, and MAR. Uneventful shift; no complaints of pain, NAD    Bedside and verbal report given by (off going nurse) LIZETH Cason RN to (oncoming nurse) Alo Rodriguez RN. Report included the following information SBAR, Kardex, OR Summary, Intake/Output, and MAR.

## 2021-04-22 NOTE — PROGRESS NOTES
Problem: Pressure Injury - Risk of  Goal: *Prevention of pressure injury  Description: Document Nikos Scale and appropriate interventions in the flowsheet. Outcome: Progressing Towards Goal  Note: Pressure Injury Interventions:  Sensory Interventions: Assess changes in LOC, Float heels, Minimize linen layers    Moisture Interventions: Absorbent underpads, Moisture barrier    Activity Interventions: Increase time out of bed, Pressure redistribution bed/mattress(bed type), PT/OT evaluation    Mobility Interventions: Pressure redistribution bed/mattress (bed type), PT/OT evaluation    Nutrition Interventions: Document food/fluid/supplement intake    Friction and Shear Interventions: Minimize layers                Problem: Falls - Risk of  Goal: *Absence of Falls  Description: Document Darell Fall Risk and appropriate interventions in the flowsheet.   Outcome: Progressing Towards Goal  Note: Fall Risk Interventions:  Mobility Interventions: Communicate number of staff needed for ambulation/transfer, Patient to call before getting OOB, PT Consult for assist device competence, Utilize walker, cane, or other assistive device         Medication Interventions: Evaluate medications/consider consulting pharmacy, Patient to call before getting OOB    Elimination Interventions: Urinal in reach, Call light in reach

## 2021-04-22 NOTE — PROGRESS NOTES
Hospitalist Progress Note    Patient: Sergio Handley MRN: [id number]  CSN: [id number]    YOB: 1945  Age: 66 y.o. Sex: male    DOA: 4/18/2021 LOS:  LOS: 4 days                Assessment/Plan     Patient Active Problem List   Diagnosis Code    Hypertension I10    COPD (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 ([facility]) J44.9    Chronic renal disease, stage 3, moderately decreased glomerular filtration rate between 30-59 mL/min/1.73 square meter ([facility]) N18.30    Acute exacerbation of chronic obstructive pulmonary disease (COPD) ([address] J44.1    Debility R53.81    Chronic respiratory failure with hypoxia ([facility]) J96.11    Tobacco dependence F17.200    Left hip pain M25.552    PAF (paroxysmal atrial fibrillation) ([facility]) I48.0    Avascular necrosis of bone of left hip ([facility]) M87.052    Acute respiratory failure with hypoxia and hypercapnia ([facility]) J96.01, J96.02    Obesity E66.9    Acute pulmonary edema ([facility]) J81.0    Anemia D64.9    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 0XA            66 y.o.  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 Very hard of hearing      Left hip pain/Hematoma -  Recent left total hip arthroplasty for avascular necrosis of the left femoral head with collapse. CT scan of abdomen and pelvis showed Extensive stranding of the subcutaneous  tissues of the hips and thighs bilaterally superficial fluid suggesting  cellulitis and/or edema. There is high density focus seen anterior to the left femur at the level of the arthroplasty suggesting a postoperative hematoma. Hematoma is more prominent than on prior exam.  Discussed with Dr. Yaneth Knight (ID).  Do not think this is infection. Skin changes are ecchymosis/hematoma. Recommended stopping vancomycin. procalcitonin <0.05, will also discontinue cefepime. Scrotal edema/Hydrocele -  improving  on lasix  Continue with IV albumin    Chronic respiratory failure -  On home oxygen by NC.    COPD -  Continue with steroids, neb treatment as needed. PAF -  On cardizem    CKD -  Stage 3, monitor renal function. HTN -  Controlled, continue meds, monitor BP. Anemia -  S/p Blood transfusion   Secondary to acute blood loss. Monitor H/H.    DVT prophylaxis will stop lovenox due to hematoma and anemia. Disposition : 1-2 days    Review of systems  General: No fevers or chills. Cardiovascular: No chest pain or pressure. No palpitations. Pulmonary: No shortness of breath. Gastrointestinal: No nausea, vomiting. Physical Exam:  General: Awake, cooperative, no acute distress    HEENT: NC, Atraumatic. PERRLA, anicteric sclerae. Lungs: CTA Bilaterally. No Wheezing/Rhonchi/Rales. Heart:  S1 S2,  No murmur, No Rubs, No Gallops  Abdomen: Soft, Non distended, Non tender.  +Bowel sounds, scrotal edema  Extremities: Left significant hip bruising also involving all of posterior thigh and buttock. Psych:   Not anxious or agitated. Neurologic:  No acute neurological deficit.            Vital signs/Intake and Output:  Visit Vitals  BP (!) 144/66   Pulse 95   Temp 98 °F (36.7 °C)   Resp 18   Ht 5' 7.99\" (1.727 m)   Wt 108.1 kg (238 lb 6.4 oz)   SpO2 100%   BMI 36.26 kg/m²     Current Shift:  04/22 0701 - 04/22 1900  In: -   Out: 250 [Urine:250]  Last three shifts:  04/20 1901 - 04/22 0700  In: 240 [P.O.:240]  Out: 2525 [Urine:2525]            Labs: Results:       Chemistry Recent Labs     04/21/21  0220 04/20/21  0410   * 162*    138   K 3.7 4.0    101   CO2 30 30   BUN 41* 38*   CREA 1.25 1.19   CA 7.1* 6.7*   AGAP 6 7   BUCR 33* 32*   AP 62 62   TP 5.7* 4.9*   ALB 3.4 2.4*   GLOB 2.3 2.5   AGRAT 1.5 1.0      CBC w/Diff Recent Labs     04/22/21  1455 04/22/21  0211 04/21/21  0220 04/20/21  0410   WBC  --   --  9.5 10.1   RBC  --   --  2.32* 2.41*   HGB 7.1* 6.8* 7.0* 7.2*   HCT 21.9* 21.7* 21.2* 22.2*   PLT  --   --  201 224   GRANS  --   --  88* 92*   LYMPH  --   --  6* 3*   EOS  --   --  0 0      Cardiac Enzymes No results for input(s): CPK, CKND1, WILLARD in the last 72 hours. No lab exists for component: CKRMB, TROIP   Coagulation No results for input(s): PTP, INR, APTT, INREXT, INREXT in the last 72 hours. Lipid Panel No results found for: CHOL, CHOLPOCT, CHOLX, CHLST, CHOLV, 071294, HDL, HDLP, LDL, LDLC, DLDLP, [id number], VLDLC, VLDL, TGLX, TRIGL, TRIGP, TGLPOCT, CHHD, CHHDX   BNP No results for input(s): BNPP in the last 72 hours.    Liver Enzymes Recent Labs     04/21/21  0220   TP 5.7*   ALB 3.4   AP 62      Thyroid Studies Lab Results   Component Value Date/Time    TSH 3.06 04/05/2021 01:30 PM        Procedures/imaging: see electronic medical records for all procedures/Xrays and details which were not copied into this note but were reviewed prior to creation of Plan

## 2021-04-22 NOTE — PROGRESS NOTES
Problem: Pressure Injury - Risk of  Goal: *Prevention of pressure injury  Description: Document Nikos Scale and appropriate interventions in the flowsheet. Outcome: Progressing Towards Goal  Note: Pressure Injury Interventions:  Sensory Interventions: Assess changes in LOC, Avoid rigorous massage over bony prominences, Check visual cues for pain, Discuss PT/OT consult with provider, Float heels, Keep linens dry and wrinkle-free, Maintain/enhance activity level, Minimize linen layers, Monitor skin under medical devices, Pad between skin to skin, Pressure redistribution bed/mattress (bed type)    Moisture Interventions: Absorbent underpads, Apply protective barrier, creams and emollients, Assess need for specialty bed, Check for incontinence Q2 hours and as needed, Maintain skin hydration (lotion/cream), Minimize layers, Moisture barrier    Activity Interventions: Increase time out of bed, Pressure redistribution bed/mattress(bed type), PT/OT evaluation    Mobility Interventions: HOB 30 degrees or less, Pressure redistribution bed/mattress (bed type), PT/OT evaluation    Nutrition Interventions: Document food/fluid/supplement intake, Offer support with meals,snacks and hydration    Friction and Shear Interventions: Apply protective barrier, creams and emollients, Foam dressings/transparent film/skin sealants, HOB 30 degrees or less, Lift sheet, Lift team/patient mobility team, Minimize layers                Problem: Falls - Risk of  Goal: *Absence of Falls  Description: Document Darell Fall Risk and appropriate interventions in the flowsheet.   Outcome: Progressing Towards Goal  Note: Fall Risk Interventions:  Mobility Interventions: Communicate number of staff needed for ambulation/transfer, OT consult for ADLs, Patient to call before getting OOB, PT Consult for mobility concerns, PT Consult for assist device competence, Utilize walker, cane, or other assistive device         Medication Interventions: Evaluate medications/consider consulting pharmacy, Patient to call before getting OOB, Teach patient to arise slowly    Elimination Interventions: Call light in reach, Patient to call for help with toileting needs, Stay With Me (per policy), Toilet paper/wipes in reach, Toileting schedule/hourly rounds, Urinal in reach

## 2021-04-22 NOTE — PROGRESS NOTES
Physician Progress Note      Sonia Diaz  CSN #:                  [id number]  :                       1945  ADMIT DATE:       2021 2:57 PM  [address] [place] DATE:  RESPONDING  PROVIDER #:        Ayesha Solo MD          QUERY TEXT:    Patient admitted with left hip hematoma , noted to have  abnormal CXR. If possible, please document in progress notes and discharge summary if you are evaluating and/or treating any of the following: The medical record reflects the following:  Risk Factors: COPD on home o2  Clinical Indicators: CXR , Interstitial infiltrates of both lung bases right greater than left base and the possibility of interstitial pneumonia. Positive for congestion    Thank You  China Cagle RN [facility] [phone]  Treatment: ***  Options provided:  -- Pneumonia  -- pneumonia not clinically significant  -- Other - I will add my own diagnosis  -- Disagree - Not applicable / Not valid  -- Disagree - Clinically unable to determine / Unknown  -- Refer to Clinical Documentation Reviewer    PROVIDER RESPONSE TEXT:    pneumonia is not clinically significant.     Query created by: Camila Prieto on 2021 12:30 PM      Electronically signed by:  Ayesha Solo MD 2021 12:48 PM

## 2021-04-22 NOTE — PROGRESS NOTES
Problem: Mobility Impaired (Adult and Pediatric)  Goal: *Acute Goals and Plan of Care (Insert Text)  Description: Physical Therapy Goals   Initiated 4/19/2021 and to be accomplished within 7 day(s)  1. Patient will move from supine <> sit with SBA/S in prep for out of bed activity and change of position. 2.  Patient will perform sit<> stand with SBA/RW WBAT L LE in prep for transfers/ambulation. 3.  Patient will transfer from bed <> chair with SBA/RW/WBAT LLE for time up in chair  as tolerated for completion of ADL activity. 4.  Patient will ambulate  30 feet with CGA/RW/WBAT L LE for improved functional mobility at discharge. Outcome: Progressing Towards Goal  []  Patient has met MD livier pereira for d/c home   []  Recommend HH with 24 hour adult care   [x]  Benefit from additional acute PT session to address:      PHYSICAL THERAPY TREATMENT    Patient: Lexii Morgan (51 y.o. male)  Date: 4/22/2021  Diagnosis: Cellulitis [L03.90]  Acute hip pain [M25.559] Cellulitis  Precautions: Fall, WBAT  PLOF: pt admitted from [place] [address]. Reports not ambulating yet    ASSESSMENT:  Pt seated EOB on arrival and expresses frustration as noted below. Explained to pt complications with regard to respiratory distress following surgery, leading to ICU admission, presence of co-morbidities including COPD, A-fib, and now hematoma with edema L LE limiting speed of progress following THR surgery. Pt expressed understanding and thanked this writer for time spent explaining and assisting pt. Performed transfers x 4, and gt with RW/min/CGA 4ft forward/backward x 2 with seated rest ~8 min break between trials. With O2 at 2 Lnc, O2 sat 100% and HR mid 90's at rest. Post gt with O2 in place, after 2nd trial O2 desat to 66%,  (h/o A-fib). Pt with abdominal breathing upon sitting. Pt required ~4 min recovery time with vc for proper breathing (smell the roses, blow out the candles). O2 sat back to 100%, HR 95. Pt expressed understanding regarding importance of breathing correctly. Left with CNA Radha present. Progression toward goals:   []      Improving appropriately and progressing toward goals  [x]      Improving slowly and progressing toward goals  []      Not making progress toward goals and plan of care will be adjusted     PLAN:  Patient continues to benefit from skilled intervention to address the above impairments. Continue treatment per established plan of care. Discharge Recommendations:  Skilled Nursing Facility  Further Equipment Recommendations for Discharge:  N/A     SUBJECTIVE:   Patient stated I'm frustrated; nobody is telling me anything. Why is it taking me so long to heal? The doctor said I'd be walking the day after surgery.     OBJECTIVE DATA SUMMARY:   Critical Behavior:  Neurologic State: Alert, Appropriate for age  Orientation Level: Oriented X4  Cognition: Follows commands  Functional Mobility Training:  Transfers:  Sit to Stand: Minimum assistance;Contact guard assistance  Stand to Sit: Minimum assistance;Contact guard assistance  Balance:  Sitting: Intact  Standing: Impaired; With support  Standing - Static: Good  Standing - Dynamic : Fair   Ambulation/Gait Training:  Distance (ft): 4 Feet (ft)(forward and backward x 2)  Assistive Device: Gait belt;Walker, rolling  Ambulation - Level of Assistance: Contact guard assistance;Minimal assistance  Gait Abnormalities: Antalgic;Decreased step clearance  Left Side Weight Bearing: As tolerated  Speed/Lisbet: Pace decreased (<100 feet/min)  Step Length: Right shortened;Left shortened  Swing Pattern: Right asymmetrical;Left asymmetrical  Interventions: Other (comment)(pacing activity and proper recovery breathing technique)  Therapeutic Exercises:   Encouraging AP's frequently on own while sitting up  Pain:  Pain level pre-treatment: 0/10  Pain level post-treatment: 0/10   Pain Intervention(s): Medication (see MAR);  Rest, Ice, Repositioning Response to intervention: Nurse notified, See doc flow  Activity Tolerance:   Fair -: limited by h/o COPD, A-fib  Please refer to the flowsheet for vital signs taken during this treatment. After treatment:   [x] Patient left in no apparent distress sitting up EOB awaiting dinner  [] Patient left in no apparent distress in bed  [x] Call bell left within reach  [x] Nursing notified-Meliza  [] Caregiver present  [] Bed alarm activated  [] SCDs applied      COMMUNICATION/EDUCATION:   [x]         Role of Physical Therapy in the acute care setting. [x]         Fall prevention education was provided and the patient/caregiver indicated understanding. [x]         Patient/family have participated as able in working toward goals and plan of care. [x]         Patient/family agree to work toward stated goals and plan of care. []         Patient understands intent and goals of therapy, but is neutral about his/her participation.   []         Patient is unable to participate in stated goals/plan of care: ongoing with therapy staff.  []         Other:        Adelfa Paget, PT   Time Calculation: 32 mins

## 2021-04-23 VITALS
BODY MASS INDEX: 35.63 KG/M2 | OXYGEN SATURATION: 100 % | WEIGHT: 235.1 LBS | TEMPERATURE: 97.9 F | SYSTOLIC BLOOD PRESSURE: 154 MMHG | DIASTOLIC BLOOD PRESSURE: 75 MMHG | RESPIRATION RATE: 20 BRPM | HEIGHT: 68 IN | HEART RATE: 89 BPM

## 2021-04-23 PROBLEM — N50.89 SCROTAL EDEMA: Status: ACTIVE | Noted: 2021-04-23

## 2021-04-23 LAB
BASOPHILS # BLD: 0 K/UL (ref 0–0.1)
BASOPHILS NFR BLD: 0 % (ref 0–2)
COVID-19 RAPID TEST, COVR: NOT DETECTED
DIFFERENTIAL METHOD BLD: ABNORMAL
EOSINOPHIL # BLD: 0 K/UL (ref 0–0.4)
EOSINOPHIL NFR BLD: 0 % (ref 0–5)
ERYTHROCYTE [DISTWIDTH] IN BLOOD BY AUTOMATED COUNT: 15.2 % (ref 11.6–14.5)
EST. AVERAGE GLUCOSE BLD GHB EST-MCNC: 120 MG/DL
GLUCOSE BLD STRIP.AUTO-MCNC: 136 MG/DL (ref 70–110)
GLUCOSE BLD STRIP.AUTO-MCNC: 210 MG/DL (ref 70–110)
GLUCOSE BLD STRIP.AUTO-MCNC: 223 MG/DL (ref 70–110)
HBA1C MFR BLD: 5.8 % (ref 4.2–5.6)
HCT VFR BLD AUTO: 22 % (ref 36–48)
HGB BLD-MCNC: 7 G/DL (ref 13–16)
LYMPHOCYTES # BLD: 0.2 K/UL (ref 0.9–3.6)
LYMPHOCYTES NFR BLD: 2 % (ref 21–52)
MAGNESIUM SERPL-MCNC: 1.9 MG/DL (ref 1.6–2.6)
MCH RBC QN AUTO: 29.9 PG (ref 24–34)
MCHC RBC AUTO-ENTMCNC: 31.8 G/DL (ref 31–37)
MCV RBC AUTO: 94 FL (ref 74–97)
METAMYELOCYTES NFR BLD MANUAL: 1 %
MONOCYTES # BLD: 0.6 K/UL (ref 0.05–1.2)
MONOCYTES NFR BLD: 7 % (ref 3–10)
NEUTS BAND NFR BLD MANUAL: 2 % (ref 0–5)
NEUTS SEG # BLD: 7.6 K/UL (ref 1.8–8)
NEUTS SEG NFR BLD: 88 % (ref 40–73)
PLATELET # BLD AUTO: 155 K/UL (ref 135–420)
PMV BLD AUTO: 9.1 FL (ref 9.2–11.8)
RBC # BLD AUTO: 2.34 M/UL (ref 4.35–5.65)
RBC MORPH BLD: ABNORMAL
SOURCE, COVRS: NORMAL
WBC # BLD AUTO: 8.4 K/UL (ref 4.6–13.2)
WBC MORPH BLD: ABNORMAL

## 2021-04-23 PROCEDURE — 82962 GLUCOSE BLOOD TEST: CPT

## 2021-04-23 PROCEDURE — 74011250637 HC RX REV CODE- 250/637: Performed by: FAMILY MEDICINE

## 2021-04-23 PROCEDURE — 83735 ASSAY OF MAGNESIUM: CPT

## 2021-04-23 PROCEDURE — P9047 ALBUMIN (HUMAN), 25%, 50ML: HCPCS | Performed by: INTERNAL MEDICINE

## 2021-04-23 PROCEDURE — 87635 SARS-COV-2 COVID-19 AMP PRB: CPT

## 2021-04-23 PROCEDURE — 74011636637 HC RX REV CODE- 636/637: Performed by: FAMILY MEDICINE

## 2021-04-23 PROCEDURE — 83036 HEMOGLOBIN GLYCOSYLATED A1C: CPT

## 2021-04-23 PROCEDURE — 74011000250 HC RX REV CODE- 250: Performed by: FAMILY MEDICINE

## 2021-04-23 PROCEDURE — 85025 COMPLETE CBC W/AUTO DIFF WBC: CPT

## 2021-04-23 PROCEDURE — 74011000250 HC RX REV CODE- 250: Performed by: INTERNAL MEDICINE

## 2021-04-23 PROCEDURE — 94760 N-INVAS EAR/PLS OXIMETRY 1: CPT

## 2021-04-23 PROCEDURE — 74011250636 HC RX REV CODE- 250/636: Performed by: INTERNAL MEDICINE

## 2021-04-23 PROCEDURE — 93005 ELECTROCARDIOGRAM TRACING: CPT

## 2021-04-23 PROCEDURE — 97530 THERAPEUTIC ACTIVITIES: CPT

## 2021-04-23 PROCEDURE — 94640 AIRWAY INHALATION TREATMENT: CPT

## 2021-04-23 PROCEDURE — 36415 COLL VENOUS BLD VENIPUNCTURE: CPT

## 2021-04-23 PROCEDURE — 74011250637 HC RX REV CODE- 250/637: Performed by: INTERNAL MEDICINE

## 2021-04-23 RX ORDER — DILTIAZEM HYDROCHLORIDE 5 MG/ML
5 INJECTION INTRAVENOUS ONCE
Status: COMPLETED | OUTPATIENT
Start: 2021-04-23 | End: 2021-04-23

## 2021-04-23 RX ORDER — INSULIN LISPRO 100 [IU]/ML
INJECTION, SOLUTION INTRAVENOUS; SUBCUTANEOUS
Status: DISCONTINUED | OUTPATIENT
Start: 2021-04-23 | End: 2021-04-23 | Stop reason: HOSPADM

## 2021-04-23 RX ORDER — MAGNESIUM SULFATE 100 %
16 CRYSTALS MISCELLANEOUS AS NEEDED
Status: DISCONTINUED | OUTPATIENT
Start: 2021-04-23 | End: 2021-04-23 | Stop reason: HOSPADM

## 2021-04-23 RX ORDER — DEXTROSE 50 % IN WATER (D50W) INTRAVENOUS SYRINGE
25-50 AS NEEDED
Status: DISCONTINUED | OUTPATIENT
Start: 2021-04-23 | End: 2021-04-23 | Stop reason: HOSPADM

## 2021-04-23 RX ORDER — DILTIAZEM HYDROCHLORIDE 30 MG/1
30 TABLET, FILM COATED ORAL DAILY
Status: DISCONTINUED | OUTPATIENT
Start: 2021-04-23 | End: 2021-04-23 | Stop reason: HOSPADM

## 2021-04-23 RX ADMIN — IPRATROPIUM BROMIDE AND ALBUTEROL SULFATE 3 ML: .5; 3 SOLUTION RESPIRATORY (INHALATION) at 16:09

## 2021-04-23 RX ADMIN — INSULIN LISPRO 4 UNITS: 100 INJECTION, SOLUTION INTRAVENOUS; SUBCUTANEOUS at 15:55

## 2021-04-23 RX ADMIN — METHYLPREDNISOLONE SODIUM SUCCINATE 40 MG: 40 INJECTION, POWDER, FOR SOLUTION INTRAMUSCULAR; INTRAVENOUS at 09:30

## 2021-04-23 RX ADMIN — FUROSEMIDE 40 MG: 10 INJECTION INTRAMUSCULAR; INTRAVENOUS at 02:00

## 2021-04-23 RX ADMIN — DILTIAZEM HYDROCHLORIDE 5 MG: 5 INJECTION INTRAVENOUS at 04:45

## 2021-04-23 RX ADMIN — Medication 10 ML: at 13:28

## 2021-04-23 RX ADMIN — IPRATROPIUM BROMIDE AND ALBUTEROL SULFATE 3 ML: .5; 3 SOLUTION RESPIRATORY (INHALATION) at 11:21

## 2021-04-23 RX ADMIN — IPRATROPIUM BROMIDE AND ALBUTEROL SULFATE 3 ML: .5; 3 SOLUTION RESPIRATORY (INHALATION) at 00:46

## 2021-04-23 RX ADMIN — FERROUS SULFATE TAB 325 MG (65 MG ELEMENTAL FE) 325 MG: 325 (65 FE) TAB at 16:05

## 2021-04-23 RX ADMIN — IPRATROPIUM BROMIDE AND ALBUTEROL SULFATE 3 ML: .5; 3 SOLUTION RESPIRATORY (INHALATION) at 03:51

## 2021-04-23 RX ADMIN — FAMOTIDINE 20 MG: 20 TABLET, FILM COATED ORAL at 09:30

## 2021-04-23 RX ADMIN — AMLODIPINE BESYLATE 5 MG: 5 TABLET ORAL at 09:30

## 2021-04-23 RX ADMIN — ARFORMOTEROL TARTRATE 15 MCG: 15 SOLUTION RESPIRATORY (INHALATION) at 07:12

## 2021-04-23 RX ADMIN — INSULIN LISPRO 4 UNITS: 100 INJECTION, SOLUTION INTRAVENOUS; SUBCUTANEOUS at 06:59

## 2021-04-23 RX ADMIN — ALBUMIN (HUMAN) 25 G: 0.25 INJECTION, SOLUTION INTRAVENOUS at 09:30

## 2021-04-23 RX ADMIN — FUROSEMIDE 40 MG: 10 INJECTION INTRAMUSCULAR; INTRAVENOUS at 13:28

## 2021-04-23 RX ADMIN — DILTIAZEM HYDROCHLORIDE 30 MG: 30 TABLET, FILM COATED ORAL at 04:45

## 2021-04-23 RX ADMIN — BUDESONIDE 500 MCG: 0.5 INHALANT RESPIRATORY (INHALATION) at 07:12

## 2021-04-23 RX ADMIN — FERROUS SULFATE TAB 325 MG (65 MG ELEMENTAL FE) 325 MG: 325 (65 FE) TAB at 09:30

## 2021-04-23 RX ADMIN — IPRATROPIUM BROMIDE AND ALBUTEROL SULFATE 3 ML: .5; 3 SOLUTION RESPIRATORY (INHALATION) at 07:12

## 2021-04-23 RX ADMIN — IPRATROPIUM BROMIDE AND ALBUTEROL SULFATE 3 ML: .5; 3 SOLUTION RESPIRATORY (INHALATION) at 04:59

## 2021-04-23 RX ADMIN — ALBUMIN (HUMAN) 25 G: 0.25 INJECTION, SOLUTION INTRAVENOUS at 02:15

## 2021-04-23 NOTE — ROUTINE PROCESS
Assume care of patient from [facility]. Patient received in bed awake. Patient A&Ox4, denies pain and discomfort. Transferred from [facility] due to A-fib RVR. PO and IV 5 mg bolus of Cardizem was given. Patient HR sustaining 140's- 150's. Patient denies SOB but also received breathing treatments before transfer. No distress noted. Bed locked in low position. Call bell within reach and patient verbalized understanding of use for assistance and needs. 12- Spoke with Dr. Anam Fang as HR still elevated in 140's A-fib. Received orders for Cardizem 5 mg drip. RBV.     2016- Patient converted into NSR in 80's, will hold off on drip and continue to monitor, MD made aware. 5196- Bedside and Verbal shift change report given to Kamila Bermudez, RN (oncoming nurse) by Shazia Lindquist RN (offgoing nurse). Report included the following information SBAR, Kardex, Intake/Output, MAR and Recent Results. [facility] [facility] Night Shift Chart Audit    Chart Audit completed?  YES

## 2021-04-23 NOTE — PROGRESS NOTES
0745:Received verbal bedside report from off going nurse JACKIE Adams Patient care received. Patient alert and oriented x 4. Patient resting in bed denies pain. Patient stable. Call light with in reach bed in lowest position.     1546:Called  and gave report to Nathalia Mccann L.P.N.

## 2021-04-23 NOTE — ROUTINE PROCESS
Dual AVS reviewed with ALEXSANDER Escamilla. All medications reviewed individually with patient. Opportunities for questions and concerns provided. Patient discharged via (mode of transport ie. Car, ambulance or air transport) [address] [place] [address],[address]  Patient's arm band appropriately discarded.

## 2021-04-23 NOTE — PROGRESS NOTES
Problem: Mobility Impaired (Adult and Pediatric)  Goal: *Acute Goals and Plan of Care (Insert Text)  Description: Physical Therapy Goals   Initiated 4/19/2021 and to be accomplished within 7 day(s)  1. Patient will move from supine <> sit with SBA/S in prep for out of bed activity and change of position. 2.  Patient will perform sit<> stand with SBA/RW WBAT L LE in prep for transfers/ambulation. 3.  Patient will transfer from bed <> chair with SBA/RW/WBAT LLE for time up in chair  as tolerated for completion of ADL activity. 4.  Patient will ambulate  30 feet with CGA/RW/WBAT L LE for improved functional mobility at discharge. Outcome: Not Progressing Towards Goal    physical Therapy TREATMENT    Patient: Agustin Wallace (78 y.o. male)  Date: 4/23/2021  Diagnosis: Cellulitis [L03.90]  Acute hip pain [M25.559] Cellulitis       Precautions: Fall, WBAT   Chart, physical therapy assessment, plan of care and goals were reviewed. ASSESSMENT:  Pt is found in chair resting. Pt noting upcoming transfer to a SNF and concern about continued health problems. Assist is needed for standing and pt is not abl eot tolerate weight shift for standing marches. Pt request to return to sitting and rest until transport. Progression toward goals:  []      Improving appropriately and progressing toward goals  [x]      Improving slowly and progressing toward goals  []      Not making progress toward goals and plan of care will be adjusted     PLAN:  Patient continues to benefit from skilled intervention to address the above impairments. Continue treatment per established plan of care. Discharge Recommendations:  Home Health  Further Equipment Recommendations for Discharge:  gait belt and rolling walker     SUBJECTIVE:   Patient stated [place] had a heart attack last night! Did you know that.     OBJECTIVE DATA SUMMARY:   Critical Behavior:  Neurologic State: Alert  Orientation Level: Oriented X4  Cognition: Appropriate decision making, Appropriate for age attention/concentration, Appropriate safety awareness, Follows commands  Functional Mobility Training:  Transfers:  Sit to Stand: Minimum assistance  Stand to Sit: Minimum assistance  Balance:  Sitting: Intact  Standing: Impaired; With support  Standing - Static: Good  Standing - Dynamic : Fair  Ambulation/Gait Training:  Distance (ft): 0 Feet (ft)(2 standing marches)  Assistive Device: Gait belt;Walker, rolling  Ambulation - Level of Assistance: Contact guard assistance;Minimal assistance  Gait Abnormalities: Antalgic;Decreased step clearance  Left Side Weight Bearing: As tolerated  Speed/Lisbet: Pace decreased (<100 feet/min)  Step Length: Right shortened;Left shortened  Swing Pattern: Right asymmetrical;Left asymmetrical  Interventions: Other (comment)  Therapeutic Exercises:   Standing march   Pain:  Pain Scale 1: Numeric (0 - 10)  Pain Intensity 1: 0  Pain out: 0  Activity Tolerance:   Fair  Please refer to the flowsheet for vital signs taken during this treatment.   After treatment:   [] Patient left in no apparent distress sitting up in chair  [x] Patient left in no apparent distress in bed  [x] Call bell left within reach  [x] Nursing notified  [] Caregiver present  [] Bed alarm activated      Tamir Moulton PTA   Time Calculation: 13 mins

## 2021-04-23 NOTE — PROGRESS NOTES
Hospitalist Progress Note    Patient: Mikayla Mcconnell MRN: [id number]  CSN: [id number]    YOB: 1945  Age: 66 y.o. Sex: male    DOA: 4/18/2021 LOS:  LOS: 5 days          Assessment/Plan     Patient Active Problem List   Diagnosis Code    Hypertension I10    COPD (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 ([facility]) J44.9    Chronic renal disease, stage 3, moderately decreased glomerular filtration rate between 30-59 mL/min/1.73 square meter ([facility]) N18.30    Acute exacerbation of chronic obstructive pulmonary disease (COPD) ([facility] [address] J44.1    Debility R53.81    Chronic respiratory failure with hypoxia ([facility]) J96.11    Tobacco dependence F17.200    Left hip pain M25.552    PAF (paroxysmal atrial fibrillation) ([facility]) I48.0    Avascular necrosis of bone of left hip ([facility]) M87.052    Acute respiratory failure with hypoxia and hypercapnia ([facility]) J96.01, J96.02    Obesity E66.9    Acute pulmonary edema ([facility]) J81.0    Anemia D64.9    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 0XA        66 y.o.  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 Overnight had episode of Afib with RVR, transferred to telemetry. Given cardizem bolus and PO. Drip not needed. Back to NSR now. 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 Postoperative hematoma. Skin changes are ecchymosis/hematoma    Scrotal edema/Hydrocele -  improving  on lasix  Continue with IV albumin    Chronic respiratory failure -  On home oxygen by NC.    COPD -  Continue with steroids, neb treatment as needed.     PAF -  On cardizem    CKD -  Stage 3, monitor renal function. HTN -  Controlled, continue meds, monitor BP. Anemia -  S/p Blood transfusion   Secondary to acute blood loss. Monitor H/H. Stable low at 7.0    DVT prophylaxis no AC, SCDs    Disposition : Accepted at [facility] today, screening covid ordered today    S: Patient mildly upset that his breakfast is not here yet. It came during my evaluation; he was very happy. Review of systems  General: No fevers or chills. Cardiovascular: No chest pain or pressure. No palpitations. Pulmonary: No shortness of breath. Gastrointestinal: No nausea, vomiting. Physical Exam:  General: Awake, cooperative, no acute distress    HEENT: NC, Atraumatic. PERRLA, anicteric sclerae. Lungs: CTA Bilaterally. No Wheezing/Rhonchi/Rales. Heart:  S1 S2,  No murmur, No Rubs, No Gallops  Abdomen: Soft, Non distended, Non tender.  +Bowel sounds, scrotal edema  Extremities: Left significant hip bruising also involving all of posterior thigh and buttock. Psych:   Not anxious or agitated. Neurologic:  No acute neurological deficit. Vital signs/Intake and Output:  Visit Vitals  BP (!) 140/67 (BP 1 Location: Left upper arm, BP Patient Position: At rest)   Pulse 86   Temp 98.6 °F (37 °C)   Resp 20   Ht 5' 7.99\" (1.727 m)   Wt 106.6 kg (235 lb 1.6 oz)   SpO2 100%   BMI 35.76 kg/m²     Current Shift:  No intake/output data recorded.   Last three shifts:  04/21 1901 - 04/23 0700  In: -   Out: 3000 [Urine:3000]      Labs: Results:       Chemistry Recent Labs     04/21/21  0220   *      K 3.7      CO2 30   BUN 41*   CREA 1.25   CA 7.1*   AGAP 6   BUCR 33*   AP 62   TP 5.7*   ALB 3.4   GLOB 2.3   AGRAT 1.5      CBC w/Diff Recent Labs     04/23/21  0300 04/22/21  1455 04/22/21  0211 04/21/21  0220   WBC 8.4  --   --  9.5   RBC 2.34*  --   --  2.32*   HGB 7.0* 7.1* 6.8* 7.0*   HCT 22.0* 21.9* 21.7* 21.2*     --   --  201   GRANS 88*  --   --  88*   LYMPH 2*  --   --  6*   EOS 0 --   --  0      Cardiac Enzymes No results for input(s): CPK, CKND1, WILLARD in the last 72 hours. No lab exists for component: CKRMB, TROIP   Coagulation No results for input(s): PTP, INR, APTT, INREXT, INREXT in the last 72 hours. Lipid Panel No results found for: CHOL, CHOLPOCT, CHOLX, CHLST, CHOLV, 717375, HDL, HDLP, LDL, LDLC, DLDLP, 364223, VLDLC, VLDL, TGLX, TRIGL, TRIGP, TGLPOCT, CHHD, CHHDX   BNP No results for input(s): BNPP in the last 72 hours.    Liver Enzymes Recent Labs     04/21/21  0220   TP 5.7*   ALB 3.4   AP 62      Thyroid Studies Lab Results   Component Value Date/Time    TSH 3.06 04/05/2021 01:30 PM        Procedures/imaging: see electronic medical records for all procedures/Xrays and details which were not copied into this note but were reviewed prior to creation of Plan

## 2021-04-23 NOTE — PROGRESS NOTES
Transition of Care Plan: [facility] SNF - 4/23/21    Communication to Patient/Family:    Met with patient and family, and they are agreeable to the transition plan. The Plan for Transition of Care is related to the following treatment goals: L hip pain, Hypoalbuminemia, Paroxysmal Afib, Chronic Leukocytosis    The Patient and/or patient representative (Ms. Adrienne Wolfe) was provided with a choice of provider and agrees   with the discharge plan. Yes [x] No []    Freedom of choice list was provided with basic dialogue that supports the patient's individualized plan of care/goals and shares the quality data associated with the providers. Yes [x] No []    SNF/Rehab Transition:  Patient has been accepted to [facility] at [address] [facility] for SNF and meets criteria for admission. Patient will transported by medical transportation and expected to leave at 1600. Communication to SNF/Rehab:  Bedside RN,  has been notified to update the transition plan to the facility and call report [phone]. Discharge information has been updated on the AVS and communicated through [place] or [facility]. Discharge instructions to be fax'd to facility at [phone]     Please include all hard scripts for controlled substances, med rec and dc summary, and AVS in packet. Please medicate for pain prior to dc if possible and needed to help offset delay when patient first arrives to facility. Reviewed and confirmed with facility, [facility] can manage the patient care needs for the following:     Lisa Bhakta with (X) only those applicable:  Medication:  [x]Medications are available at the facility  []IV Antibiotics    []Controlled Substance - hard copies available sent.   []Weekly Labs    Equipment:  []CPAP/BiPAP  []Wound Vacuum  []Humphreys or Urinary Device  []PICC/Central Line  []Nebulizer  []Ventilator    Treatment:  []Isolation (for MRSA, VRE, etc.)  []Surgical Drain Management  []Tracheostomy Care  []Dressing Changes  []Dialysis with transportation  []PEG Care  []Oxygen  []Daily Weights for Heart Failure    Dietary:  []Any diet limitations  []Tube Feedings   []Total Parenteral Management (TPN)    Financial Resources:  []Medicaid Application Completed    [x]UAI Completed and copy given to pt/family. []A screening has previously been completed. []Level II Completed    [] Private pay individual who will not become   financially eligible for Medicaid within 6 months from admission to a [address] [place] [address] facility. [] Individual refused to have screening conducted. []Medicaid Application Completed    []The screening denied because it was determined individual did not need/did not qualify for nursing facility level of care. [] Out of state residents seeking direct admission to a [address] facility. [] Individuals who are inpatients of an out of state hospital, or in state or out of state veterans/ hospital and seek direct admission to a [address] facility  [] Individuals who are pateints or residents of a state owned/operated facility that is licensed by Department of [employer] ([employer]) and seek direct admission to [address] [place] [address]  [] A screening not required for enrollment in [facility] services as set out in [facility]-  [] [facility]) staff shall perform screenings of the [facility] clients. Advanced Care Plan:  []Surrogate Decision Maker of Care  []POA  []Communicated Code Status and copy sent.     Other:

## 2021-04-23 NOTE — DISCHARGE SUMMARY
Discharge Summary    Patient: Lexii Morgan MRN: [id number]  CSN: [id number]    YOB: 1945  Age: 66 y.o.   Sex: male    DOA: 4/18/2021 LOS:  LOS: 5 days   Discharge Date:      Primary Care Provider:  Ortega Dunaway MD    Admission Diagnoses: Cellulitis [L03.90]  Acute hip pain [M25.559]    Discharge Diagnoses:    Problem List as of 4/23/2021 Date Reviewed: 2/27/2021          Codes Class Noted - Resolved    Hematoma of left hip ICD-10-CM: S70.02XA  ICD-9-CM: 924.01  4/19/2021 - Present        Acute hip pain ICD-10-CM: M25.559  ICD-9-CM: 719.45  4/18/2021 - Present        Status post total replacement of left hip ICD-10-CM: Z96.642  ICD-9-CM: V43.64  4/14/2021 - Present        Anemia ICD-10-CM: D64.9  ICD-9-CM: 285.9  4/11/2021 - Present        Acute respiratory failure with hypoxia and hypercapnia ([facility] ICD-10-CM: J96.01, J96.02  ICD-9-CM: 518.81  4/10/2021 - Present        Obesity ICD-10-CM: E66.9  ICD-9-CM: 278.00  4/10/2021 - Present        Acute pulmonary edema ([facility] ICD-10-CM: J81.0  ICD-9-CM: 518.4  4/10/2021 - Present        Avascular necrosis of bone of left hip ([facility] ICD-10-CM: M87.052  ICD-9-CM: 733.42  4/8/2021 - Present        Left hip pain ICD-10-CM: M25.552  ICD-9-CM: 719.45  4/5/2021 - Present        PAF (paroxysmal atrial fibrillation) ([facility] ICD-10-CM: I48.0  ICD-9-CM: 427.31  4/5/2021 - Present        Tobacco dependence ICD-10-CM: F17.200  ICD-9-CM: 305.1  2/21/2020 - Present        Chronic respiratory failure with hypoxia ([facility] ICD-10-CM: J96.11  ICD-9-CM: 518.83, 799.02  8/19/2019 - Present        Debility ICD-10-CM: R53.81  ICD-9-CM: 799.3  7/8/2019 - Present        Acute exacerbation of chronic obstructive pulmonary disease (COPD) ([facility] ICD-10-CM: J44.1  ICD-9-CM: 491.21  2/8/2019 - Present        Chronic renal disease, stage 3, moderately decreased glomerular filtration rate between 30-59 mL/min/1.73 square meter ([facility] ICD-10-CM: N18.30  ICD-9-CM: 585.3  7/20/2018 - Present COPD (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 ([facility] ICD-10-CM: J44.9  ICD-9-CM: 491.20  4/20/2018 - Present        Hypertension ICD-10-CM: I10  ICD-9-CM: 401.9  Unknown - Present        * (Principal) RESOLVED: Cellulitis ICD-10-CM: L03.90  ICD-9-CM: 682.9  4/18/2021 - 4/20/2021        RESOLVED: Avascular necrosis ([facility] ICD-10-CM: M87.00  ICD-9-CM: 733.40  4/8/2021 - 4/8/2021        RESOLVED: Acute respiratory failure ([facility] ICD-10-CM: J96.00  ICD-9-CM: 518.81  2/26/2021 - 4/10/2021        RESOLVED: Respiratory distress ICD-10-CM: R06.03  ICD-9-CM: 786.09  2/2/2021 - 4/10/2021        RESOLVED: COPD exacerbation ([facility] ICD-10-CM: J44.1  ICD-9-CM: 491.21  2/2/2021 - 4/10/2021        RESOLVED: Atrial fibrillation with RVR ([facility] ICD-10-CM: I48.91  ICD-9-CM: 427.31  2/2/2021 - 4/10/2021        RESOLVED: COPD (chronic obstructive pulmonary disease) ([facility] ICD-10-CM: J44.9  ICD-9-CM: 496  8/5/2020 - 4/10/2021        RESOLVED: CAP (community acquired pneumonia) ICD-10-CM: J18.9  ICD-9-CM: 486  8/5/2020 - 4/10/2021        RESOLVED: Advanced care planning/counseling discussion ICD-10-CM: J42.28  ICD-9-CM: V65.49  Unknown - 4/10/2021        RESOLVED: Hypokalemia ICD-10-CM: E87.6  ICD-9-CM: 276.8  4/14/2020 - 4/10/2021        RESOLVED: Hyponatremia ICD-10-CM: E87.1  ICD-9-CM: 276.1  4/10/2020 - 4/10/2021        RESOLVED: COPD with acute exacerbation ([email or web address] ICD-10-CM: J44.1  ICD-9-CM: 491.21  4/9/2020 - 4/10/2021        RESOLVED: Acute respiratory failure with hypoxia and hypercarbia (HCC) ICD-10-CM: J96.01, J96.02  ICD-9-CM: 518.81  4/9/2020 - 4/10/2021        RESOLVED: Pleural effusion, right ICD-10-CM: J90  ICD-9-CM: 511.9  2/22/2020 - 4/10/2021        RESOLVED: Hyponatremia ICD-10-CM: E87.1  ICD-9-CM: 276.1  2/21/2020 - 4/10/2021        RESOLVED: Acute on chronic respiratory failure with hypoxia and hypercapnia (HCC) ICD-10-CM: J96.21, J96.22  ICD-9-CM: 518.84, 786.09, 799.02  10/30/2019 - 2/25/2020 RESOLVED: Paroxysmal atrial fibrillation (HCC) ICD-10-CM: I48.0  ICD-9-CM: 427.31  8/19/2019 - 4/10/2021        RESOLVED: Asbestosis ([place] [address] ICD-10-CM: O23  ICD-9-CM: 190  10/19/2018 - 4/10/2021        RESOLVED: COPD with asthma and status asthmaticus ([facility] ICD-10-CM: J44.9, J45.902  ICD-9-CM: 493.21  7/20/2018 - 2/8/2019        RESOLVED: Status asthmaticus with COPD (chronic obstructive pulmonary disease) ([facility] ICD-10-CM: J44.9, J45.902  ICD-9-CM: 493.21  4/20/2018 - 2/8/2019        RESOLVED: PVC's (premature ventricular contractions) ICD-10-CM: I49.3  ICD-9-CM: 427.69  4/20/2018 - 2/8/2019        RESOLVED: Acute respiratory failure ([place] [address] ICD-10-CM: J96.00  ICD-9-CM: 518.81  10/2/2014 - 3/18/2017        RESOLVED: URIEL (acute kidney injury) ([place] [address] ICD-10-CM: N17.9  ICD-9-CM: 584.9  10/2/2014 - 2/8/2019        RESOLVED: Pneumonia ICD-10-CM: J18.9  ICD-9-CM: 486  10/2/2014 - 3/18/2017              Discharge Medications:     Current Discharge Medication List      CONTINUE these medications which have NOT CHANGED    Details   arformoteroL (BROVANA) 15 mcg/2 mL nebu neb solution 2 mL by Nebulization route two (2) times a day. Qty: 30 Vial, Refills: 0      budesonide (PULMICORT) 0.5 mg/2 mL nbsp 2 mL by Nebulization route two (2) times a day. Qty: 30 Each, Refills: 0      enoxaparin (LOVENOX) 40 mg/0.4 mL 0.4 mL by SubCUTAneous route daily. Qty: 21 Syringe, Refills: 0      famotidine (PEPCID) 20 mg tablet Take 1 Tab by mouth daily. Qty: 30 Tab, Refills: 0      ferrous sulfate 325 mg (65 mg iron) tablet Take 1 Tab by mouth two (2) times daily (with meals). Qty: 30 Tab, Refills: 0      amLODIPine (NORVASC) 5 mg tablet Take 1 Tab by mouth daily. Qty: 30 Tab, Refills: 0      albuterol-ipratropium (DUO-NEB) 2.5 mg-0.5 mg/3 ml nebu 3 mL by Nebulization route every four (4) hours as needed for Wheezing.   Qty: 30 Nebule, Refills: 0      albuterol (PROVENTIL HFA, VENTOLIN HFA, PROAIR HFA) 90 mcg/actuation inhaler Take 2 Puffs by inhalation every four (4) hours as needed for Wheezing or Shortness of Breath. Qty: 1 Inhaler, Refills: 1      OXYGEN-AIR DELIVERY SYSTEMS 2 L/min by Nasal route continuous. furosemide (Lasix) 20 mg tablet Take 20 mg by mouth daily. dilTIAZem (CARDIZEM) 30 mg tablet Take 1 Tab by mouth Before breakfast, lunch, and dinner. Qty: 90 Tab, Refills: 0      acetaminophen (TYLENOL) 325 mg tablet Take 650 mg by mouth every eight (8) hours as needed for Pain. dextran 70/hypromellose (ARTIFICIAL TEARS, PF, OP) Apply 2 Drops to eye as needed for Other (both eyes for dryness). diphenhydrAMINE (BENADRYL) 25 mg capsule Take 25 mg by mouth nightly as needed for Itching. tamsulosin (FLOMAX) 0.4 mg capsule Take 0.4 mg by mouth every evening. STOP taking these medications       doxycycline (MONODOX) 100 mg capsule Comments:   Reason for Stopping:         predniSONE (STERAPRED) 5 mg dose pack Comments:   Reason for Stopping:               Discharge Condition: Improved    Procedures : None    Consults: Infectious disease specialist      PHYSICAL EXAM   Visit Vitals  /61 (BP 1 Location: Left upper arm, BP Patient Position: At rest)   Pulse 91   Temp 97.5 °F (36.4 °C)   Resp 20   Ht 5' 7.99\" (1.727 m)   Wt 106.6 kg (235 lb 1.6 oz)   SpO2 100%   BMI 35.76 kg/m²     General: Awake, cooperative, no acute distress    HEENT: NC, Atraumatic. PERRLA, EOMI. Anicteric sclerae. Lungs:  CTA Bilaterally. No Wheezing/Rhonchi/Rales. Heart:  Regular  rhythm,  No murmur, No Rubs, No Gallops  Abdomen: Soft, Non distended, Non tender. +Bowel sounds,   Extremities: No c/c/e  Psych:   Not anxious or agitated. Neurologic:  No acute neurological deficits.                                  Admission HPI : Beba You is a 66 y.o.  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 Patient had an extensive hospital stay and was discharged 4 days ago please see discharge summary and last H&P for further details. Patient was discharged from ICU several days ago had developed hypotension respiratory failure due to narcotics and developed a large hip hematoma. His hemoglobin had been stable and he did not require multiple transfusions he was discharged on Lovenox to rehab. However there is been oozing through the left hip area worsening leukocytosis. In the emergency room the lateral skin area was cultured patient had exquisite pain in the scrotal area with edema his orthopedic surgeon was contacted who will evaluate in the morning for possible hip aspiration    Hospital Course : Patient is a 68-year-old male with a history of COPD on home oxygen asbestosis, hypertension, recent hip replacement left side presents from rehab with worsening pain and inability to walk as well as worsening lower extremity edema and scrotal edema that have been progressively worsening since discharge. Patient was found to have a postoperative hematoma and some skin changes consistent with ecchymosis and hematoma. Originally was thought the patient might have had an infection in the same area but that was determined not to be the case. As a result of the hematoma patient did become significantly anemic and required a blood transfusion secondary to acute blood loss. His hemoglobin hematocrit stabilized and at the time of discharge his hemoglobin was 7. Patient was also diagnosed and treated in patient with scrotal edema/hydrocele which was improving. Patient was given IV Lasix and IV albumin. At the time of discharge he was discharged on p.o. Lasix with expected continued improvement. Patient has a history of chronic respiratory failure and COPD and those were treated with home medications.   During his hospitalization his hypertension was controlled and he was placed back on his home medications and his blood pressure was monitored. Patient has a history of chronic kidney disease stage III renal function was monitored while he was inpatient no acute concerns. The night before discharge patient had an episode where he went into A. fib with RVR and had to be given an extra dose of Cardizem. However he converted back to normal sinus rhythm pretty quickly and did not have to be started on drip and has been stable all day prior to discharge. At the time of discharge Lovenox was not continued and it was advised the patient not continue on anticoagulation to use SCDs instead as a result of patient having hematoma and required a blood transfusion. Activity: As tolerated per PT OT at rehab facility    Diet: Cardiac diet    Follow-up: With PCP in 1 week    Disposition: Transition back to [facility]    Minutes spent on discharge: 40 minutes      Labs: Results:       Chemistry Recent Labs     04/21/21  0220   *      K 3.7      CO2 30   BUN 41*   CREA 1.25   CA 7.1*   AGAP 6   BUCR 33*   AP 62   TP 5.7*   ALB 3.4   GLOB 2.3   AGRAT 1.5      CBC w/Diff Recent Labs     04/23/21  0300 04/22/21  1455 04/22/21  0211 04/21/21  0220   WBC 8.4  --   --  9.5   RBC 2.34*  --   --  2.32*   HGB 7.0* 7.1* 6.8* 7.0*   HCT 22.0* 21.9* 21.7* 21.2*     --   --  201   GRANS 88*  --   --  88*   LYMPH 2*  --   --  6*   EOS 0  --   --  0      Cardiac Enzymes No results for input(s): CPK, CKND1, WILLARD in the last 72 hours. No lab exists for component: CKRMB, TROIP   Coagulation No results for input(s): PTP, INR, APTT, INREXT in the last 72 hours. Lipid Panel No results found for: CHOL, CHOLPOCT, CHOLX, CHLST, CHOLV, 677863, HDL, HDLP, LDL, LDLC, DLDLP, 677524, VLDLC, VLDL, TGLX, TRIGL, TRIGP, TGLPOCT, CHHD, CHHDX   BNP No results for input(s): BNPP in the last 72 hours.    Liver Enzymes Recent Labs 04/21/21  0220   TP 5.7*   ALB 3.4   AP 62      Thyroid Studies Lab Results   Component Value Date/Time    TSH 3.06 04/05/2021 01:30 PM            Significant Diagnostic Studies: Xr Hip Lt W Or Wo Pelv 2-3 Vws    Result Date: 4/11/2021  EXAM: 2 views left hip CLINICAL INDICATION/HISTORY: Left hip pain COMPARISON: 4/5/2021 _______________ FINDINGS: Status post left arthroplasty. Hardware is intact and aligned. No fracture or dislocation. Soft tissue swelling and gas around the left hip from the recent surgery. _______________     1. Status post left hip arthroplasty with no fracture or dislocation identified. Soft tissue swelling and gas identified around the left hip from recent surgery. Xr Hip Lt W Or Wo Pelv 2-3 Vws    Result Date: 4/5/2021  EXAM: LEFT HIP RADIOGRAPHS CLINICAL INDICATION/HISTORY: left hip pain, difficulty ambulating -Additional: None COMPARISON: Supine with 2/26/2021 TECHNIQUE: AP pelvis and frog-leg lateral view of left hip. _______________ FINDINGS: BONES: Severe left hip osteoarthrosis with femoral head remodeling and collapse. No evidence of acute displaced fracture or dislocation. SOFT TISSUES: Unremarkable. _______________     Severe left hip osteoarthrosis with femoral head collapse/remodeling. Sclerosis and lucency in femoral head may reflect collapse/remodeling versus AVN. Consider MRI in the appropriate clinical setting. No evidence of acute fracture or dislocation. Xr Knee Lt Max 2 Vws    Result Date: 4/5/2021  HISTORY: -Provided on order: pain, difficulty ambulating -Additional: None Technique: 2 views of left knee are presented for interpretation. Comparison study: none. Findings: The bones are osteopenic. There is no plain film evident fracture or dislocation. No radiopaque foreign body or significant arthropathy. No significant arthropathy. No plain film evident knee joint effusion is seen. 1. No acute bony abnormality is identified by plain films.  Intrinsic knee ligamentous, meniscal and cartilaginous integrity can be best evaluated by routine knee MRI if clinically warranted. Cta Chest W Or W Wo Cont    Result Date: 4/11/2021  EXAM: CTA chest CLINICAL INDICATION/HISTORY: Shortness of breath COMPARISON: Chest radiograph dated 4/10/2021 chest CTA dated 10/31/2019 TECHNIQUE: Axial CT imaging from the thoracic inlet through the diaphragm with intravenous contrast. Coronal and sagittal MIP reformats were generated. One or more dose reduction techniques were used on this CT: automated exposure control, adjustment of the mAs and/or kVp according to patient size, and iterative reconstruction techniques. The specific techniques used on this CT exam have been documented in the patient's electronic medical record. Digital Imaging and Communications in Medicine (DICOM) format image data are available to nonaffiliated external healthcare facilities or entities on a secured, media free, reciprocally searchable basis with patient authorization for at least a 12-month period after this study. _______________ FINDINGS: EXAM QUALITY: Adequate PULMONARY ARTERIES: No pulmonary embolism identified. MEDIASTINUM: Normal heart size. No aortic dissection. Atherosclerotic calcifications in the aorta and coronary arteries. No pericardial effusion. LUNGS: Scattered atelectasis and scarring. No focal consolidation. No suspicious nodules or masses. AIRWAY: Normal. PLEURA: Chronic right-sided pleural calcifications with chronic pleural collections. No pneumothorax. LYMPH NODES: No enlarged nodes. UPPER ABDOMEN: Calcified granuloma in the liver. Right upper pole renal cyst is unchanged from 10/31/2019. No follow-up imaging necessary. BONES: No acute or aggressive osseous abnormalities identified. OTHER: None. _______________     1. No pulmonary embolism identified. 2. Chronic right-sided pleural calcifications and chronic pleural collections which are unchanged compared to prior exams.  No new focal consolidation. Scattered atelectasis and/or scarring in the lungs. Ct Pelv W Cont    Result Date: 4/11/2021  EXAM: CT of the pelvis CLINICAL INDICATION/HISTORY: Recent left hip arthroplasty with left hip pain COMPARISON: CT left hip dated 4/6/2021, left hip radiographs dated 4/11/2021 TECHNIQUE: Axial CT imaging of the pelvis was performed with intravenous contrast. Multiplanar reformats were generated. One or more dose reduction techniques were used on this CT: automated exposure control, adjustment of the mAs and/or kVp according to patient size, and iterative reconstruction techniques. The specific techniques used on this CT exam have been documented in the patient's electronic medical record. Digital Imaging [employer] Communications in Medicine (DICOM) format image data are available to nonaffiliated external healthcare facilities or entities on a secured, media free, reciprocally searchable basis with patient authorization for at least a 12-month period after this study. _______________ FINDINGS: PELVIC ORGANS: Mild bladder wall thickening. Limited evaluation of the prostate secondary to streak artifact from left hip arthroplasty. LYMPH NODES: No enlarged lymph nodes. GASTROINTESTINAL TRACT: Moderate stool burden in the rectum. No bowel obstruction identified in the lower abdomen or pelvis. VASCULATURE: Aortoiliac atherosclerosis. BONES: Recent left hip arthroplasty. Hardware appears intact. No dislocation. No acute fracture identified. Soft tissue swelling, hematoma, and gas identified around the left hip from the recent surgery. More focal organized hematoma near the left hip on image 57 measures 3.4 cm. Hematoma also seen tracking along the thigh musculature. OTHER: Small fat-containing left inguinal hernia. _______________     1. Recent left hip arthroplasty with no fracture or dislocation identified. Soft tissue swelling, hematoma, and gas identified around the left hip from the recent surgery.      Ct Abd Pelv W Cont    Result Date: 4/18/2021  EXAM: CT of the abdomen and pelvis INDICATION: Infection of hip, evaluate for scrotal infection COMPARISON: April 11, 2021 TECHNIQUE: Axial CT imaging of the abdomen and pelvis was performed with intravenous contrast. Multiplanar reformats were generated. One or more dose reduction techniques were used on this CT: automated exposure control, adjustment of the mAs and/or kVp according to patient size, and iterative reconstruction techniques. The specific techniques used on this CT exam have been documented in the patient's electronic medical record. Digital Imaging [employer] Communications in Medicine (DICOM) format image data are available to nonaffiliated external healthcare facilities or entities on a secure, media free, reciprocally searchable basis with patient authorization for at least a 12-month period after this study. _______________ FINDINGS: LOWER CHEST: There is loculated pleural fluid with calcification of the parietal and visceral pleura. Adjacent parenchymal scarring seen. LIVER, BILIARY: Liver is normal. No biliary dilation. Gallbladder is unremarkable. PANCREAS: Normal. SPLEEN: Normal. ADRENALS: Left adrenal is unremarkable. There is a right adrenal nodule measuring 14 mm suggesting a myelolipoma. Faint areas of fat density are seen in this nodule. KIDNEYS: Right kidney: No suspicious appearing renal lesion seen. Right kidney demonstrates no hydronephrosis or nephrolithiasis. No ureteral obstruction. Left kidney: Unremarkable without ureteral obstruction. VASCULATURE: Mild to moderate calcific atherosclerosis present. LYMPH NODES: No enlarged lymph nodes. GASTROINTESTINAL TRACT: No bowel dilation or wall thickening. There is colonic diverticulosis without diverticulitis. Moderate to large amount of retained stool is present in the colon. Appendix is normal. PELVIC ORGANS: Evaluation the pelvis is limited by the beam hardening artifacts from left hip arthroplasty. Patulous left internal canal with fat content seen. There is no evidence of gas to suggest Susana's gangrene. There is extensive superficial stranding/edema of the subcutaneous tissues of both hips and proximal thigh, suggesting cellulitis. No soft tissue gas is seen in the thigh. Correlate with clinical findings to exclude any compartment syndrome. Bilateral hydroceles seen. BONES: There is a left hip arthroplasty. There is no loosening of the hardware. There is a lobulated high density area measuring 8.7 x 6.5 cm anterior to the left proximal femur suggesting postoperative hematoma. Another subcutaneous high density focus seen measuring 3.6 x 2.3 cm left thigh anteriorly again suggesting hematoma. Small dot of gas is seen superior to the left hip arthroplasty on axial image 117. The degree of gas is decreased from prior exam is likely postsurgical. If there is any clinical concern for septic arthritis then I would advise aspiration of the joint. There is severe osteopenia. OTHER: None. _______________     No evidence of Susana's gangrene. Extensive stranding of the subcutaneous tissues of the hips and thighs bilaterally superficial fluid suggesting cellulitis and/or edema. Correlate with clinical findings to exclude compartment syndrome. There is a left hip arthroplasty. There is high density focus seen anterior to the left femur at the level of the arthroplasty suggesting a postoperative hematoma. Hematoma is more prominent than on prior exam. Small dot of gas is seen adjacent to the left hip joint which may be postoperative, however If there is any clinical concern for septic arthritis then I would advise aspiration the hip joint. Loculated pleural fluid collection in the right hemithorax with calcification the parietal and visceral pleura unchanged. Right adrenal adenoma. Bilateral fairly large hydroceles.     Ct Hip Lt Wo Cont    Result Date: 4/6/2021  --------------------------------------------------------------------------- <<<<<<<<<           [facility]           >>>>>>>>> --------------------------------------------------------------------------- HISTORY: -From Provider:r/o avn, not candidate for mri -Additional: None COMPARISON EXAMINATIONS:   None. TECHNIQUE:   CT of the left hip without intravenous contrast administration. Coronal and sagittal reformats were generated and reviewed. One or more dose reduction techniques were used on this CT: automated exposure control, adjustment of the mAs and/or kVp according to patient size, and iterative reconstruction techniques. The specific techniques used on this CT exam have been documented in the patient's electronic medical record. --------------------------     FINDINGS    -------------------------- OSSEOUS STRUCTURES:  Left hip: There is irregular subchondral lucency and cortical irregularity with slight flattening/collapse of the left femoral head. Severe degenerative changes in the right hip with severe joint space narrowing, subchondral cysts and slight marginal spurring. Soft tissue thickening about the joint space suggesting hip joint effusion. Visualized bony pelvis and proximal femur: Degenerative changes in the SI joints and spine. Relatively severe central and foraminal stenosis at L4/5 and to lesser degree at L3/4. Visualized pelvic soft tissues: LYMPH NODES:  Subcentimeter short axis left groin lymph nodes are present. [ ] GI TRACT:  Colonic diverticulosis, without CT evidence diverticulitis. Feculent distention of the rectum. PELVIC ORGANS:  The visualized portion of the bladder is unremarkable. VASCULATURE:  Atherosclerotic calcification in visualized left iliac and femoral arteries. OTHER:   No ascites or free intraperitoneal air in visualized extent. Fat-containing left inguinal hernia. Fatty change in the gluteal musculature bilaterally.  Subcutaneous stranding/edema along left more than right lateral flank. ---------------------------------------------------------------------------    --------------------------------------------------------------------------- 1. Subchondral serpiginous lucencies in the weightbearing left femoral head suspicious for avascular necrosis, with associated cortical irregularity in keeping with flattening/collapse. Suspected left hip joint effusion. 2. Relatively severe mild degenerative changes in the left hip. Mild degenerative changes in the right hip. Degenerative changes in the SI joints and spine. In the setting of radiculopathy, routine lumbar spine MRI could best further assess. Xr Chest Port    Result Date: 4/18/2021  EXAM:  AP Portable Chest X-ray 1 view INDICATION: Shortness of breath COMPARISON: April 10, 2021 _______________ FINDINGS:  Heart and mediastinal contours are within normal limits for portable radiograph. There are interstitial infiltrates of both lung bases raising the possibility of interstitial pneumonia. . There is a teardrop shaped rim calcified pleural density as seen on CT. No pleural effusion seen otherwise. No acute osseous findings. ________________      Interstitial infiltrates of both lung bases right greater than left base and the possibility of interstitial pneumonia. Xr Chest Port    Result Date: 4/10/2021  EXAM: PORTABLE CHEST HISTORY: Chest pain and shortness of breath. COMPARISON: 4/5/2021 TECHNIQUE: Single portable view. _______________ FINDINGS: SUPPORT DEVICES: None HEART AND MEDIASTINUM: Cardiac size is normal. Mediastinal contours are normal. Normal pulmonary vasculature. LUNGS AND PLEURAL SPACES: Right base pleuroparenchymal scarring without change. Multiple areas of right pleural calcification without change. BONES AND SOFT TISSUES: Bony structures are normal. Chronic elevation right hemidiaphragm. _______________     No evidence of active pulmonary process.  Chronic right basilar pleuroparenchymal scarring and multiple right sided pleural plaques without change. Xr Chest Port    Result Date: 4/5/2021  HISTORY: -Provided with order: SOB -Additional: Bilateral lower extremity swelling. Technique : AP PORTABLE CHEST Comparison : 02/28/21 FINDINGS: Patient is rotated to the right. The chin obscures the upper thorax. There is mild motion degradation and external artifact which additionally limits visibility. HEART AND MEDIASTINUM: Unremarkable. LUNGS AND PLEURAL SPACES: Calcified pleural plaques present on the right. Vague opacity in the right infrahilar region is less evident than the prior, but degraded by motion. There is stable elevation of the right diaphragm. BONY THORAX AND SOFT TISSUES: No acute osseous abnormality. Improved aeration with mild, less evident ill-defined opacity in the right infrahilar region, which can reflect chronic atelectasis or infiltrate. No additional change. Edna Askew Technologist Service    Result Date: 4/13/2021  See impression. Fluoroscopy was provided for this procedure under the supervision and/or direction of the attending provider. ? For further information regarding this procedure, see patient medical record. Xr Hip Harriet [place] Or Wo Pelv  1 Vw    Result Date: 4/9/2021  EXAM:LEFT HIP HISTORY: Left hip pain. COMPARISON: 4/5/2021 TECHNIQUE: Single AP view _______________ FINDINGS: BONES: Left hip total arthroplasty with prosthetic components properly positioned and aligned on the single limited frontal view. No additional bony abnormality. SOFT TISSUES: Expected reactive and emphysematous changes in soft tissues adjacent to left hip. _______________     Normal postoperative appearance left hip arthroplasty with expected postoperative changes in adjacent soft tissues. Echo Stress    Addendum Date: 4/8/2021    · Baseline ECG: Normal EKG.  · Stress test: Negative stress test. Exercise stress test: EKG stress test results correlate with a low risk of inducible myocardial ischemia. · Echo: Normal stress echocardiogram. · Technically difficult study due to severe COPD and suboptimal echocardiographic windows in spite of using definity contrast reduces accurarcy of test.  Overall Normal Dobutamine stress echocardiogram without evidence of ischemia. Resting EF 55% Peak dubatminestress EF 75%. Discussed with patient. Result Date: 4/8/2021  · Baseline ECG: Normal EKG. · Stress test: Negative stress test. Exercise stress test: EKG stress test results correlate with a low risk of inducible myocardial ischemia. · Echo: Normal stress echocardiogram. · Technically difficult study due to severe COPD and suboptimal echocardiographic windows in spite of using definity contrast reduces accurarcy of test.  Overall Normal Dobutamine stress echocardiogram without evidence of ischemia. Resting EF 55% Peak dubatminestress EF 75%. Discussed with patient. Duplex Lower Ext Venous Left    Result Date: 4/19/2021  · No evidence of acute deep vein thrombosis in the left common femoral, proximal to mid superficial femoral, popliteal and distal posterior tibial veins. The veins were imaged in the transverse and longitudinal planes. The vessels showed normal color filling and compressibility. Doppler interrogation showed phasic and spontaneous flow. · No evidence of deep vein thrombosis in the right common femoral vein. · Unable to perform compression analysis on the left common femoral and saphenofemoral veins due to pain. · Left distal femoral, peroneal and proximal to mid posterior tibial veins were not visualized due to edema. Duplex Lower Ext Venous Bilat    Result Date: 4/12/2021  · No evidence of deep vein thrombosis in the right lower extremity. · No evidence of deep vein thrombosis in the left lower extremity. Duplex Lower Ext Venous Bilat    Result Date: 4/5/2021  · No evidence of deep vein thrombosis in the right lower extremity veins assessed.  · No evidence of deep vein thrombosis in the left lower extremity veins assessed. No results found for this or any previous visit.         CC: Kayode Trujillo MD

## 2021-04-23 NOTE — PROGRESS NOTES
Problem: Pressure Injury - Risk of  Goal: *Prevention of pressure injury  Description: Document Nikos Scale and appropriate interventions in the flowsheet. Outcome: Progressing Towards Goal  Note: Pressure Injury Interventions:  Sensory Interventions: Assess changes in LOC, Float heels, Keep linens dry and wrinkle-free, Maintain/enhance activity level, Monitor skin under medical devices, Pressure redistribution bed/mattress (bed type)    Moisture Interventions: Absorbent underpads, Moisture barrier    Activity Interventions: Pressure redistribution bed/mattress(bed type), Increase time out of bed, PT/OT evaluation    Mobility Interventions: HOB 30 degrees or less, PT/OT evaluation, Pressure redistribution bed/mattress (bed type)    Nutrition Interventions: Document food/fluid/supplement intake, Offer support with meals,snacks and hydration    Friction and Shear Interventions: Minimize layers                Problem: Patient Education: Go to Patient Education Activity  Goal: Patient/Family Education  Outcome: Progressing Towards Goal     Problem: Falls - Risk of  Goal: *Absence of Falls  Description: Document Darell Fall Risk and appropriate interventions in the flowsheet.   Outcome: Progressing Towards Goal  Note: Fall Risk Interventions:  Mobility Interventions: Bed/chair exit alarm, Communicate number of staff needed for ambulation/transfer, Patient to call before getting OOB         Medication Interventions: Bed/chair exit alarm, Patient to call before getting OOB, Teach patient to arise slowly    Elimination Interventions: Bed/chair exit alarm, Call light in reach, Patient to call for help with toileting needs, Toilet paper/wipes in reach, Toileting schedule/hourly rounds    History of Falls Interventions: Bed/chair exit alarm, Door open when patient unattended, Investigate reason for fall         Problem: Patient Education: Go to Patient Education Activity  Goal: Patient/Family Education  Outcome: Progressing Towards Goal     Problem: Patient Education: Go to Patient Education Activity  Goal: Patient/Family Education  Outcome: Progressing Towards Goal     Problem: Patient Education: Go to Patient Education Activity  Goal: Patient/Family Education  Outcome: Progressing Towards Goal     Problem: Patient Education: Go to Patient Education Activity  Goal: Patient/Family Education  Outcome: Progressing Towards Goal     Problem: Afib Pathway: Day 1  Goal: Off Pathway (Use only if patient is Off Pathway)  Outcome: Progressing Towards Goal  Goal: Activity/Safety  Outcome: Progressing Towards Goal  Goal: Consults, if ordered  Outcome: Progressing Towards Goal  Goal: Diagnostic Test/Procedures  Outcome: Progressing Towards Goal  Goal: Nutrition/Diet  Outcome: Progressing Towards Goal  Goal: Discharge Planning  Outcome: Progressing Towards Goal  Goal: Medications  Outcome: Progressing Towards Goal  Goal: Respiratory  Outcome: Progressing Towards Goal  Goal: Treatments/Interventions/Procedures  Outcome: Progressing Towards Goal  Goal: Psychosocial  Outcome: Progressing Towards Goal  Goal: *Optimal pain control at patient's stated goal  Outcome: Progressing Towards Goal  Goal: *Hemodynamically stable  Outcome: Progressing Towards Goal  Goal: *Stable cardiac rhythm  Outcome: Progressing Towards Goal  Goal: *Lungs clear or at baseline  Outcome: Progressing Towards Goal  Goal: *Labs within defined limits  Outcome: Progressing Towards Goal  Goal: *Describes available resources and support systems  Outcome: Progressing Towards Goal     Problem: Afib Pathway: Day 2  Goal: Off Pathway (Use only if patient is Off Pathway)  Outcome: Progressing Towards Goal  Goal: Activity/Safety  Outcome: Progressing Towards Goal  Goal: Consults, if ordered  Outcome: Progressing Towards Goal  Goal: Diagnostic Test/Procedures  Outcome: Progressing Towards Goal  Goal: Nutrition/Diet  Outcome: Progressing Towards Goal  Goal: Discharge Planning  Outcome: Progressing Towards Goal  Goal: Medications  Outcome: Progressing Towards Goal  Goal: Respiratory  Outcome: Progressing Towards Goal  Goal: Treatments/Interventions/Procedures  Outcome: Progressing Towards Goal  Goal: Psychosocial  Outcome: Progressing Towards Goal  Goal: *Hemodynamically stable  Outcome: Progressing Towards Goal  Goal: *Optimal pain control at patient's stated goal  Outcome: Progressing Towards Goal  Goal: *Stable cardiac rhythm  Outcome: Progressing Towards Goal  Goal: *Lungs clear or at baseline  Outcome: Progressing Towards Goal  Goal: *Describes available resources and support systems  Outcome: Progressing Towards Goal     Problem: Afib Pathway: Day 3  Goal: Off Pathway (Use only if patient is Off Pathway)  Outcome: Progressing Towards Goal  Goal: Activity/Safety  Outcome: Progressing Towards Goal  Goal: Diagnostic Test/Procedures  Outcome: Progressing Towards Goal  Goal: Nutrition/Diet  Outcome: Progressing Towards Goal  Goal: Discharge Planning  Outcome: Progressing Towards Goal  Goal: Medications  Outcome: Progressing Towards Goal  Goal: Respiratory  Outcome: Progressing Towards Goal  Goal: Treatments/Interventions/Procedures  Outcome: Progressing Towards Goal  Goal: Psychosocial  Outcome: Progressing Towards Goal     Problem: Afib: Discharge Outcomes (not in CAT)  Goal: *Hemodynamically stable  Outcome: Progressing Towards Goal  Goal: *Stable cardiac rhythm  Outcome: Progressing Towards Goal  Goal: *Lungs clear or at baseline  Outcome: Progressing Towards Goal  Goal: *Optimal pain control at patient's stated goal  Outcome: Progressing Towards Goal  Goal: *Identifies cardiac risk factors  Outcome: Progressing Towards Goal  Goal: *Verbalizes home exercise program, activity guidelines, cardiac precautions  Outcome: Progressing Towards Goal  Goal: *Verbalizes understanding and describes prescribed diet  Outcome: Progressing Towards Goal  Goal: *Verbalizes understanding and describes medication purposes and frequencies  Outcome: Progressing Towards Goal  Goal: *Anxiety reduced or absent  Outcome: Progressing Towards Goal  Goal: *Understands and describes signs and symptoms to report to providers(Stroke Metric)  Outcome: Progressing Towards Goal  Goal: *Describes follow-up/return visits to physicians  Outcome: Progressing Towards Goal  Goal: *Describes available resources and support systems  Outcome: Progressing Towards Goal  Goal: *Influenza immunization  Outcome: Progressing Towards Goal  Goal: *Pneumococcal immunization  Outcome: Progressing Towards Goal  Goal: *Describes smoking cessation resources  Outcome: Progressing Towards Goal     Problem: Gas Exchange - Impaired  Goal: *Absence of hypoxia  Outcome: Progressing Towards Goal     Problem: Patient Education: Go to Patient Education Activity  Goal: Patient/Family Education  Outcome: Progressing Towards Goal

## 2021-04-23 NOTE — ROUTINE PROCESS
Pt called out for significantly increased SOB. Immediately gave duodeb and obtained vitals. Pt reporting some chest pain with no radiating to the arm, neck, or back. VS WNL, except HR which was in the 160s. Provider notified. Obtained stat EKG showing a fib with RVR. Provider ordered 5mg IV diltiazem and 30mg PO diltiazem, which were both given. Lab called, CBC to be added. Pt will transfer to telemetry.

## 2021-04-23 NOTE — PROGRESS NOTES
Problem: Pressure Injury - Risk of  Goal: *Prevention of pressure injury  Description: Document Nikos Scale and appropriate interventions in the flowsheet. Outcome: Progressing Towards Goal  Note: Pressure Injury Interventions:  Sensory Interventions: Assess changes in LOC, Discuss PT/OT consult with provider, Float heels, Keep linens dry and wrinkle-free, Minimize linen layers, Pressure redistribution bed/mattress (bed type)    Moisture Interventions: Absorbent underpads, Minimize layers    Activity Interventions: Pressure redistribution bed/mattress(bed type), PT/OT evaluation    Mobility Interventions: Pressure redistribution bed/mattress (bed type), PT/OT evaluation    Nutrition Interventions: Document food/fluid/supplement intake    Friction and Shear Interventions: Apply protective barrier, creams and emollients, Minimize layers                Problem: Patient Education: Go to Patient Education Activity  Goal: Patient/Family Education  Outcome: Progressing Towards Goal     Problem: Falls - Risk of  Goal: *Absence of Falls  Description: Document Darell Fall Risk and appropriate interventions in the flowsheet.   Outcome: Progressing Towards Goal  Note: Fall Risk Interventions:  Mobility Interventions: Communicate number of staff needed for ambulation/transfer, OT consult for ADLs, PT Consult for mobility concerns, Patient to call before getting OOB, PT Consult for assist device competence         Medication Interventions: Teach patient to arise slowly, Patient to call before getting OOB    Elimination Interventions: Call light in reach, Patient to call for help with toileting needs    History of Falls Interventions: Door open when patient unattended, Investigate reason for fall, Room close to nurse's station         Problem: Patient Education: Go to Patient Education Activity  Goal: Patient/Family Education  Outcome: Progressing Towards Goal

## 2021-04-23 NOTE — PROGRESS NOTES
He went into a-fib RVR (documented on EKG). He has a history of paroxysmal afib. Will give 5 mg diltiazem now and transfer to tele. Will also order 30 mg tablet of IR diltiazem. AM labs ordered to check lytes and hemoglobin as possible triggers.

## 2021-04-24 LAB
ATRIAL RATE: 163 BPM
BACTERIA SPEC CULT: NORMAL
BACTERIA SPEC CULT: NORMAL
CALCULATED R AXIS, ECG10: 44 DEGREES
CALCULATED T AXIS, ECG11: 35 DEGREES
DIAGNOSIS, 93000: NORMAL
Q-T INTERVAL, ECG07: 280 MS
QRS DURATION, ECG06: 72 MS
QTC CALCULATION (BEZET), ECG08: 399 MS
SERVICE CMNT-IMP: NORMAL
SERVICE CMNT-IMP: NORMAL
VENTRICULAR RATE, ECG03: 122 BPM

## 2021-04-27 ENCOUNTER — HOSPITAL ENCOUNTER (OUTPATIENT)
Dept: LAB | Age: 76
Discharge: HOME | End: 2021-04-27

## 2021-04-27 LAB
ALBUMIN SERPL-MCNC: 3.6 G/DL (ref 3.4–5)
ALBUMIN/GLOB SERPL: 1.6 {RATIO} (ref 0.8–1.7)
ALP SERPL-CCNC: 86 U/L (ref 45–117)
ALT SERPL-CCNC: 28 U/L (ref 16–61)
ANION GAP SERPL CALC-SCNC: 8 MMOL/L (ref 3–18)
AST SERPL-CCNC: 22 U/L (ref 10–38)
BASOPHILS # BLD: 0 K/UL (ref 0–0.1)
BASOPHILS NFR BLD: 0 % (ref 0–2)
BILIRUB SERPL-MCNC: 1.3 MG/DL (ref 0.2–1)
BUN SERPL-MCNC: 39 MG/DL (ref 7–18)
BUN/CREAT SERPL: 35 (ref 12–20)
CALCIUM SERPL-MCNC: 6.7 MG/DL (ref 8.5–10.1)
CHLORIDE SERPL-SCNC: 101 MMOL/L (ref 100–111)
CO2 SERPL-SCNC: 30 MMOL/L (ref 21–32)
CREAT SERPL-MCNC: 1.11 MG/DL (ref 0.6–1.3)
DIFFERENTIAL METHOD BLD: ABNORMAL
EOSINOPHIL # BLD: 0.5 K/UL (ref 0–0.4)
EOSINOPHIL NFR BLD: 5 % (ref 0–5)
ERYTHROCYTE [DISTWIDTH] IN BLOOD BY AUTOMATED COUNT: 15.5 % (ref 11.6–14.5)
GLOBULIN SER CALC-MCNC: 2.2 G/DL (ref 2–4)
GLUCOSE SERPL-MCNC: 108 MG/DL (ref 74–99)
HCT VFR BLD AUTO: 26.1 % (ref 36–48)
HGB BLD-MCNC: 8.4 G/DL (ref 13–16)
LYMPHOCYTES # BLD: 0.6 K/UL (ref 0.9–3.6)
LYMPHOCYTES NFR BLD: 7 % (ref 21–52)
MCH RBC QN AUTO: 30.8 PG (ref 24–34)
MCHC RBC AUTO-ENTMCNC: 32.2 G/DL (ref 31–37)
MCV RBC AUTO: 95.6 FL (ref 74–97)
MONOCYTES # BLD: 0.6 K/UL (ref 0.05–1.2)
MONOCYTES NFR BLD: 6 % (ref 3–10)
NEUTS SEG # BLD: 7.8 K/UL (ref 1.8–8)
NEUTS SEG NFR BLD: 80 % (ref 40–73)
PLATELET # BLD AUTO: 180 K/UL (ref 135–420)
PMV BLD AUTO: 9.8 FL (ref 9.2–11.8)
POTASSIUM SERPL-SCNC: 3.8 MMOL/L (ref 3.5–5.5)
PROT SERPL-MCNC: 5.8 G/DL (ref 6.4–8.2)
RBC # BLD AUTO: 2.73 M/UL (ref 4.35–5.65)
SODIUM SERPL-SCNC: 139 MMOL/L (ref 136–145)
WBC # BLD AUTO: 9.7 K/UL (ref 4.6–13.2)

## 2021-04-27 PROCEDURE — 80053 COMPREHEN METABOLIC PANEL: CPT

## 2021-04-27 PROCEDURE — 85025 COMPLETE CBC W/AUTO DIFF WBC: CPT

## 2021-04-29 ENCOUNTER — PATIENT OUTREACH (OUTPATIENT)
Dept: CASE MANAGEMENT | Age: 76
End: 2021-04-29

## 2021-04-29 NOTE — PROGRESS NOTES
Post Acute Facility Update     *The information contained in this note was received during a weekly care coordination call with the post acute facility*    [place] [facility] [place] [facility] [place] ([facility])   on 4/23/21. Hospital Discharge Diagnosis per Dr. Davidson Amen:    Cellutis  Left Hip Hematoma  Anemia  Acute Resp Failure with Hypoxia and Hypercapnia  Obesity  PAF  COPD      Current Facility: [address] ([facility])  Anticipated Discharge Date: TBD    Facility Nursing Update: Erythema and Edema in both legs and scrotum  Facility Social Work Update: lives with wife  Upper Extremity Assistance: Contact Guard Assist - hands on patient for balance   Lower Extremity Assistance: Maximum Assistance  Bed Mobility: Moderate Assistance   Transfers: Moderate Assistance   Ambulation: Maximum Assistance  How far (in feet) is the patient ambulating?  15-20 feet  Device Used: walker  Barriers to Discharge: none at this time  Other:

## 2021-04-30 ENCOUNTER — HOSPITAL ENCOUNTER (OUTPATIENT)
Dept: LAB | Age: 76
Discharge: HOME | End: 2021-04-30

## 2021-04-30 LAB
ANION GAP SERPL CALC-SCNC: 11 MMOL/L (ref 3–18)
BNP SERPL-MCNC: 183 PG/ML (ref 0–1800)
BUN SERPL-MCNC: 40 MG/DL (ref 7–18)
BUN/CREAT SERPL: 30 (ref 12–20)
CALCIUM SERPL-MCNC: 7.1 MG/DL (ref 8.5–10.1)
CHLORIDE SERPL-SCNC: 100 MMOL/L (ref 100–111)
CO2 SERPL-SCNC: 27 MMOL/L (ref 21–32)
CREAT SERPL-MCNC: 1.32 MG/DL (ref 0.6–1.3)
GLUCOSE SERPL-MCNC: 101 MG/DL (ref 74–99)
POTASSIUM SERPL-SCNC: 3.3 MMOL/L (ref 3.5–5.5)
SODIUM SERPL-SCNC: 138 MMOL/L (ref 136–145)

## 2021-04-30 PROCEDURE — 80048 BASIC METABOLIC PNL TOTAL CA: CPT

## 2021-04-30 PROCEDURE — 83880 ASSAY OF NATRIURETIC PEPTIDE: CPT

## 2021-05-03 ENCOUNTER — HOSPITAL ENCOUNTER (OUTPATIENT)
Dept: LAB | Age: 76
Discharge: HOME | End: 2021-05-03

## 2021-05-03 LAB
ANION GAP SERPL CALC-SCNC: 10 MMOL/L (ref 3–18)
BUN SERPL-MCNC: 29 MG/DL (ref 7–18)
BUN/CREAT SERPL: 23 (ref 12–20)
CALCIUM SERPL-MCNC: 7.5 MG/DL (ref 8.5–10.1)
CHLORIDE SERPL-SCNC: 99 MMOL/L (ref 100–111)
CO2 SERPL-SCNC: 29 MMOL/L (ref 21–32)
CREAT SERPL-MCNC: 1.26 MG/DL (ref 0.6–1.3)
GLUCOSE SERPL-MCNC: 89 MG/DL (ref 74–99)
MAGNESIUM SERPL-MCNC: 1.6 MG/DL (ref 1.6–2.6)
POTASSIUM SERPL-SCNC: 3.5 MMOL/L (ref 3.5–5.5)
SODIUM SERPL-SCNC: 138 MMOL/L (ref 136–145)

## 2021-05-03 PROCEDURE — 80048 BASIC METABOLIC PNL TOTAL CA: CPT

## 2021-05-03 PROCEDURE — 83735 ASSAY OF MAGNESIUM: CPT

## 2021-05-06 ENCOUNTER — PATIENT OUTREACH (OUTPATIENT)
Dept: CASE MANAGEMENT | Age: 76
End: 2021-05-06

## 2021-05-06 ENCOUNTER — HOSPITAL ENCOUNTER (OUTPATIENT)
Dept: LAB | Age: 76
Discharge: HOME | End: 2021-05-06

## 2021-05-06 LAB
ANION GAP SERPL CALC-SCNC: 6 MMOL/L (ref 3–18)
BASOPHILS # BLD: 0.1 K/UL (ref 0–0.1)
BASOPHILS NFR BLD: 1 % (ref 0–2)
BUN SERPL-MCNC: 23 MG/DL (ref 7–18)
BUN/CREAT SERPL: 19 (ref 12–20)
CALCIUM SERPL-MCNC: 7.4 MG/DL (ref 8.5–10.1)
CHLORIDE SERPL-SCNC: 99 MMOL/L (ref 100–111)
CO2 SERPL-SCNC: 33 MMOL/L (ref 21–32)
CREAT SERPL-MCNC: 1.21 MG/DL (ref 0.6–1.3)
DIFFERENTIAL METHOD BLD: ABNORMAL
EOSINOPHIL # BLD: 0.4 K/UL (ref 0–0.4)
EOSINOPHIL NFR BLD: 6 % (ref 0–5)
ERYTHROCYTE [DISTWIDTH] IN BLOOD BY AUTOMATED COUNT: 14.2 % (ref 11.6–14.5)
GLUCOSE SERPL-MCNC: 95 MG/DL (ref 74–99)
HCT VFR BLD AUTO: 21 % (ref 36–48)
HGB BLD-MCNC: 6.7 G/DL (ref 13–16)
LYMPHOCYTES # BLD: 0.8 K/UL (ref 0.9–3.6)
LYMPHOCYTES NFR BLD: 12 % (ref 21–52)
MAGNESIUM SERPL-MCNC: 1.6 MG/DL (ref 1.6–2.6)
MCH RBC QN AUTO: 29.9 PG (ref 24–34)
MCHC RBC AUTO-ENTMCNC: 31.9 G/DL (ref 31–37)
MCV RBC AUTO: 93.8 FL (ref 74–97)
METAMYELOCYTES NFR BLD MANUAL: 1 %
MONOCYTES # BLD: 0.6 K/UL (ref 0.05–1.2)
MONOCYTES NFR BLD: 9 % (ref 3–10)
MYELOCYTES NFR BLD MANUAL: 2 %
NEUTS SEG # BLD: 4.4 K/UL (ref 1.8–8)
NEUTS SEG NFR BLD: 69 % (ref 40–73)
PLATELET # BLD AUTO: 374 K/UL (ref 135–420)
PMV BLD AUTO: 9.3 FL (ref 9.2–11.8)
POTASSIUM SERPL-SCNC: 3.7 MMOL/L (ref 3.5–5.5)
RBC # BLD AUTO: 2.24 M/UL (ref 4.35–5.65)
RBC MORPH BLD: ABNORMAL
SODIUM SERPL-SCNC: 138 MMOL/L (ref 136–145)
WBC # BLD AUTO: 6.4 K/UL (ref 4.6–13.2)

## 2021-05-06 PROCEDURE — 85025 COMPLETE CBC W/AUTO DIFF WBC: CPT

## 2021-05-06 PROCEDURE — 80048 BASIC METABOLIC PNL TOTAL CA: CPT

## 2021-05-06 PROCEDURE — 83735 ASSAY OF MAGNESIUM: CPT

## 2021-05-07 ENCOUNTER — NURSE NAVIGATOR (OUTPATIENT)
Dept: OTHER | Age: 76
End: 2021-05-07

## 2021-05-07 NOTE — PROGRESS NOTES
Referring Provider: Ajay Veliz MD      Lung Cancer Risk Profile:   Age: 66  Gender: Male  Height: 68\"  Weight: 235#    Smoking History:  Smoking Status: past use  # years smokin  # years quit: 2  Packs/day: 1  Pack years: 61    Patient discussed smoking cessation with PCP: Unknown    Patient participated in shared decision making process with PCP: Unknown    Patient is currently experiencing symptoms: No    If yes what symptoms:     Co-Morbidities:  COPD    Cancer History:  Prostate    Additional Risk Factors:     Aunt with lung cancer, exposure to second hand smoke, occupational exposure to asbestos      Patient's smoking history verified via EMR. Patient meets LDCT lung cancer screening criteria.        SHYANNE VasquezN, RN, OCN  Lung Health Nurse Navigator

## 2021-05-07 NOTE — PROGRESS NOTES
Post Acute Facility Update     *The information contained in this note was received during a weekly care coordination call with the post acute facility*    [place] [facility] [place] [facility] [place] ([facility])   on 4/23/21. Hospital Discharge Diagnosis per Dr. Nakita Arango:    Cellutis  Left Hip Hematoma  Anemia  Acute Resp Failure with Hypoxia and Hypercapnia  Obesity  PAF  COPD      Current Facility: [address] ([facility])  Anticipated Discharge Date: D    Facility Nursing Update: Erythema and Edema in both legs and scrotum, To ED this week due to edema and Erythema, returned with no new orders. On antibiotics  Facility Social Work Update: lives with wife  Upper Extremity Assistance: Stand by Assist - Presence and Cueing  Lower Extremity Assistance: Dependent  Bed Mobility: Mod/Max Assistance  Transfers: Minimal Assistance   Ambulation: Minimal Assistance   How far (in feet) is the patient ambulating?  15-20 feet  Device Used: wheelchair  Barriers to Discharge: none at this time  Other:

## 2021-05-10 ENCOUNTER — HOSPITAL ENCOUNTER (OUTPATIENT)
Dept: LAB | Age: 76
Discharge: HOME | End: 2021-05-10

## 2021-05-10 LAB
ANION GAP SERPL CALC-SCNC: 6 MMOL/L (ref 3–18)
BASOPHILS # BLD: 0.1 K/UL (ref 0–0.1)
BASOPHILS NFR BLD: 2 % (ref 0–2)
BNP SERPL-MCNC: 628 PG/ML (ref 0–1800)
BUN SERPL-MCNC: 18 MG/DL (ref 7–18)
BUN/CREAT SERPL: 15 (ref 12–20)
CALCIUM SERPL-MCNC: 7.9 MG/DL (ref 8.5–10.1)
CHLORIDE SERPL-SCNC: 98 MMOL/L (ref 100–111)
CO2 SERPL-SCNC: 31 MMOL/L (ref 21–32)
CREAT SERPL-MCNC: 1.17 MG/DL (ref 0.6–1.3)
DIFFERENTIAL METHOD BLD: ABNORMAL
EOSINOPHIL # BLD: 0.5 K/UL (ref 0–0.4)
EOSINOPHIL NFR BLD: 7 % (ref 0–5)
ERYTHROCYTE [DISTWIDTH] IN BLOOD BY AUTOMATED COUNT: 14 % (ref 11.6–14.5)
GLUCOSE SERPL-MCNC: 91 MG/DL (ref 74–99)
HCT VFR BLD AUTO: 21.6 % (ref 36–48)
HGB BLD-MCNC: 7 G/DL (ref 13–16)
LYMPHOCYTES # BLD: 1.2 K/UL (ref 0.9–3.6)
LYMPHOCYTES NFR BLD: 16 % (ref 21–52)
MAGNESIUM SERPL-MCNC: 1.7 MG/DL (ref 1.6–2.6)
MCH RBC QN AUTO: 30.2 PG (ref 24–34)
MCHC RBC AUTO-ENTMCNC: 32.4 G/DL (ref 31–37)
MCV RBC AUTO: 93.1 FL (ref 74–97)
METAMYELOCYTES NFR BLD MANUAL: 1 %
MONOCYTES # BLD: 0.4 K/UL (ref 0.05–1.2)
MONOCYTES NFR BLD: 6 % (ref 3–10)
MYELOCYTES NFR BLD MANUAL: 2 %
NEUTS BAND NFR BLD MANUAL: 7 % (ref 0–5)
NEUTS SEG # BLD: 4.8 K/UL (ref 1.8–8)
NEUTS SEG NFR BLD: 59 % (ref 40–73)
PLATELET # BLD AUTO: 414 K/UL (ref 135–420)
PMV BLD AUTO: 9.1 FL (ref 9.2–11.8)
POTASSIUM SERPL-SCNC: 4.4 MMOL/L (ref 3.5–5.5)
RBC # BLD AUTO: 2.32 M/UL (ref 4.35–5.65)
RBC MORPH BLD: ABNORMAL
RBC MORPH BLD: ABNORMAL
SODIUM SERPL-SCNC: 135 MMOL/L (ref 136–145)
WBC # BLD AUTO: 7.3 K/UL (ref 4.6–13.2)
WBC MORPH BLD: ABNORMAL

## 2021-05-10 PROCEDURE — 83880 ASSAY OF NATRIURETIC PEPTIDE: CPT

## 2021-05-10 PROCEDURE — 83735 ASSAY OF MAGNESIUM: CPT

## 2021-05-10 PROCEDURE — 80048 BASIC METABOLIC PNL TOTAL CA: CPT

## 2021-05-10 PROCEDURE — 85025 COMPLETE CBC W/AUTO DIFF WBC: CPT

## 2021-05-12 ENCOUNTER — HOSPITAL ENCOUNTER (OUTPATIENT)
Dept: LAB | Age: 76
Discharge: HOME | End: 2021-05-12

## 2021-05-12 LAB
ANION GAP SERPL CALC-SCNC: 6 MMOL/L (ref 3–18)
BASOPHILS # BLD: 0.1 K/UL (ref 0–0.1)
BASOPHILS NFR BLD: 1 % (ref 0–2)
BUN SERPL-MCNC: 19 MG/DL (ref 7–18)
BUN/CREAT SERPL: 15 (ref 12–20)
CALCIUM SERPL-MCNC: 8 MG/DL (ref 8.5–10.1)
CHLORIDE SERPL-SCNC: 99 MMOL/L (ref 100–111)
CO2 SERPL-SCNC: 32 MMOL/L (ref 21–32)
CREAT SERPL-MCNC: 1.3 MG/DL (ref 0.6–1.3)
DIFFERENTIAL METHOD BLD: ABNORMAL
EOSINOPHIL # BLD: 0.2 K/UL (ref 0–0.4)
EOSINOPHIL NFR BLD: 2 % (ref 0–5)
ERYTHROCYTE [DISTWIDTH] IN BLOOD BY AUTOMATED COUNT: 14.2 % (ref 11.6–14.5)
GLUCOSE SERPL-MCNC: 95 MG/DL (ref 74–99)
HCT VFR BLD AUTO: 23.5 % (ref 36–48)
HGB BLD-MCNC: 7.5 G/DL (ref 13–16)
LYMPHOCYTES # BLD: 1.6 K/UL (ref 0.9–3.6)
LYMPHOCYTES NFR BLD: 17 % (ref 21–52)
MAGNESIUM SERPL-MCNC: 1.8 MG/DL (ref 1.6–2.6)
MCH RBC QN AUTO: 29.9 PG (ref 24–34)
MCHC RBC AUTO-ENTMCNC: 31.9 G/DL (ref 31–37)
MCV RBC AUTO: 93.6 FL (ref 74–97)
MONOCYTES # BLD: 0.7 K/UL (ref 0.05–1.2)
MONOCYTES NFR BLD: 7 % (ref 3–10)
MYELOCYTES NFR BLD MANUAL: 3 %
NEUTS BAND NFR BLD MANUAL: 2 % (ref 0–5)
NEUTS SEG # BLD: 6.4 K/UL (ref 1.8–8)
NEUTS SEG NFR BLD: 67 % (ref 40–73)
PLATELET # BLD AUTO: 398 K/UL (ref 135–420)
PMV BLD AUTO: 8.8 FL (ref 9.2–11.8)
POTASSIUM SERPL-SCNC: 4.3 MMOL/L (ref 3.5–5.5)
PROMYELOCYTES NFR BLD MANUAL: 1 %
RBC # BLD AUTO: 2.51 M/UL (ref 4.35–5.65)
RBC MORPH BLD: ABNORMAL
RBC MORPH BLD: ABNORMAL
SODIUM SERPL-SCNC: 137 MMOL/L (ref 136–145)
WBC # BLD AUTO: 9.3 K/UL (ref 4.6–13.2)
WBC MORPH BLD: ABNORMAL

## 2021-05-12 PROCEDURE — 85025 COMPLETE CBC W/AUTO DIFF WBC: CPT

## 2021-05-12 PROCEDURE — 80048 BASIC METABOLIC PNL TOTAL CA: CPT

## 2021-05-12 PROCEDURE — 83735 ASSAY OF MAGNESIUM: CPT

## 2021-05-13 ENCOUNTER — PATIENT OUTREACH (OUTPATIENT)
Dept: CASE MANAGEMENT | Age: 76
End: 2021-05-13

## 2021-05-14 NOTE — PROGRESS NOTES
Post Acute Facility Update     *The information contained in this note was received during a weekly care coordination call with the post acute facility*    This patient was [place] [facility] [place] [facility] [place] ([facility])   on 4/23/21. Hospital Discharge Diagnosis per Dr. Smooth Rasmussen:    Cellutis  Left Hip Hematoma  Anemia  Acute Resp Failure with Hypoxia and Hypercapnia  Obesity  PAF  COPD      Current Facility: [address] ([facility])  Anticipated Discharge Date: 5/19/21 Tentatively    Facility Nursing Update: Erythema and Edema in both legs and scrotum but better this week   On antibiotics  Facility Social Work Update: lives with wife  Upper Extremity Assistance: Stand by Assist - Presence and Cueing  Lower Extremity Assistance: Moderate Assistance   Bed Mobility: Moderate Assistance   Transfers: Minimal Assistance   Ambulation: Minimal Assistance   How far (in feet) is the patient ambulating?  15-20 feet  Device Used: wheelchair  Barriers to Discharge: none at this time  Other:

## 2021-05-18 ENCOUNTER — HOSPITAL ENCOUNTER (OUTPATIENT)
Dept: LAB | Age: 76
Discharge: HOME | End: 2021-05-18

## 2021-05-18 LAB
ANION GAP SERPL CALC-SCNC: 6 MMOL/L (ref 3–18)
BASOPHILS # BLD: 0.1 K/UL (ref 0–0.1)
BASOPHILS NFR BLD: 1 % (ref 0–2)
BNP SERPL-MCNC: 149 PG/ML (ref 0–1800)
BUN SERPL-MCNC: 25 MG/DL (ref 7–18)
BUN/CREAT SERPL: 17 (ref 12–20)
CALCIUM SERPL-MCNC: 8.6 MG/DL (ref 8.5–10.1)
CHLORIDE SERPL-SCNC: 98 MMOL/L (ref 100–111)
CO2 SERPL-SCNC: 32 MMOL/L (ref 21–32)
CREAT SERPL-MCNC: 1.5 MG/DL (ref 0.6–1.3)
DIFFERENTIAL METHOD BLD: ABNORMAL
EOSINOPHIL # BLD: 0.9 K/UL (ref 0–0.4)
EOSINOPHIL NFR BLD: 8 % (ref 0–5)
ERYTHROCYTE [DISTWIDTH] IN BLOOD BY AUTOMATED COUNT: 14.1 % (ref 11.6–14.5)
GLUCOSE SERPL-MCNC: 135 MG/DL (ref 74–99)
HCT VFR BLD AUTO: 26.1 % (ref 36–48)
HGB BLD-MCNC: 8.3 G/DL (ref 13–16)
LYMPHOCYTES # BLD: 1.4 K/UL (ref 0.9–3.6)
LYMPHOCYTES NFR BLD: 12 % (ref 21–52)
MAGNESIUM SERPL-MCNC: 2.1 MG/DL (ref 1.6–2.6)
MCH RBC QN AUTO: 29.9 PG (ref 24–34)
MCHC RBC AUTO-ENTMCNC: 31.8 G/DL (ref 31–37)
MCV RBC AUTO: 93.9 FL (ref 74–97)
MONOCYTES # BLD: 1.1 K/UL (ref 0.05–1.2)
MONOCYTES NFR BLD: 10 % (ref 3–10)
NEUTS SEG # BLD: 7.8 K/UL (ref 1.8–8)
NEUTS SEG NFR BLD: 69 % (ref 40–73)
PLATELET # BLD AUTO: 402 K/UL (ref 135–420)
PLATELET COMMENTS,PCOM: ABNORMAL
PMV BLD AUTO: 9 FL (ref 9.2–11.8)
POTASSIUM SERPL-SCNC: 4.1 MMOL/L (ref 3.5–5.5)
RBC # BLD AUTO: 2.78 M/UL (ref 4.35–5.65)
RBC MORPH BLD: ABNORMAL
SODIUM SERPL-SCNC: 136 MMOL/L (ref 136–145)
WBC # BLD AUTO: 11.3 K/UL (ref 4.6–13.2)

## 2021-05-18 PROCEDURE — 80048 BASIC METABOLIC PNL TOTAL CA: CPT

## 2021-05-18 PROCEDURE — 83880 ASSAY OF NATRIURETIC PEPTIDE: CPT

## 2021-05-18 PROCEDURE — 85025 COMPLETE CBC W/AUTO DIFF WBC: CPT

## 2021-05-18 PROCEDURE — 83735 ASSAY OF MAGNESIUM: CPT

## 2021-05-24 ENCOUNTER — PATIENT OUTREACH (OUTPATIENT)
Dept: CASE MANAGEMENT | Age: 76
End: 2021-05-24

## 2021-05-26 ENCOUNTER — HOSPITAL ENCOUNTER (OUTPATIENT)
Dept: WOUND CARE | Age: 76
Discharge: HOME | End: 2021-05-26
Attending: HOSPITALIST
Payer: COMMERCIAL

## 2021-05-26 VITALS
OXYGEN SATURATION: 99 % | TEMPERATURE: 98.4 F | DIASTOLIC BLOOD PRESSURE: 44 MMHG | HEART RATE: 99 BPM | RESPIRATION RATE: 22 BRPM | SYSTOLIC BLOOD PRESSURE: 142 MMHG

## 2021-05-26 PROBLEM — S71.002A OPEN WOUND OF LEFT HIP: Status: ACTIVE | Noted: 2021-05-26

## 2021-05-26 PROCEDURE — 11042 DBRDMT SUBQ TIS 1ST 20SQCM/<: CPT

## 2021-05-26 NOTE — WOUND CARE
05/26/21 1441   Wound Hip Left   Date First Assessed/Time First Assessed: 05/26/21 1440   Present on Hospital Admission: Yes  Primary Wound Type: Open incision/surgical site  Location: Hip  Wound Location Orientation: Left   Wound Image     Wound Etiology Non-Healing Surgical   Dressing Status Breakthrough drainage noted   Cleansed Wound cleanser   Dressing/Treatment Other (Comment)  (mesalt, foam)   Dressing Change Due 06/02/21   Wound Length (cm) 3 cm   Wound Width (cm) 1.5 cm   Wound Depth (cm)   (slough CATHIE)   Wound Surface Area (cm^2) 4.5 cm^2   Post-Procedure Length (cm) 3 cm   Post-Procedure Width (cm) 1.5 cm   Post-Procedure Depth (cm) 0.4 cm   Post-Procedure Surface Area (cm^2) 4.5 cm^2   Post-Procedure Volume (cm^3) 1.8 cm^3   Wound Assessment Slough   Drainage Amount Moderate   Drainage Description Serosanguinous   Wound Odor None   Camryn-Wound/Incision Assessment Blanchable erythema   Edges Undefined edges   Wound Thickness Description Full thickness

## 2021-05-26 NOTE — WOUND CARE
[facility]  Follow up note. Chief Complaint:  Charity Wilson is a 66 y.o.  male who presents for follow up of left hip open wound along incision site. 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 Cardiovascular: No chest pain or pressure. No palpitations. Pulmonary: No shortness of breath. Gastrointestinal: No nausea, vomiting. Derm: See above                Physical Exam:     Visit Vitals  BP (!) 142/44   Pulse 99   Temp 98.4 °F (36.9 °C)   Resp 22   SpO2 99%     General: well developed, well nourished, pleasant , NAD. Hygiene good  Psych: cooperative. Pleasant. No anxiety or depression. Normal mood and affect. Neuro: alert and oriented to person/place/situation. Otherwise nonfocal.  Derm: Skin turgor normal for age, dry skin  HEENT: Normocephalic, atraumatic. EOMI. Conjunctiva clear. No scleral icterus. Chest: Good air entry bilaterally. Respirations non labored  Cardio[de-identified] Normal heart sounds,no rubs, murmurs or gallops  Abdomen: Soft, nontender, nondistended, normoactive bowel sounds  Lower extremities: color normal; temperature normal. Calves are supple, nontender.  Capillary refill <3 sec      Wound Description:   Wound Length:      Wound Width :     Wound Depth :     Wound Hip Left   Date First Assessed/Time First Assessed: 05/26/21 1440   Present on Hospital Admission: Yes  Primary Wound Type: Open incision/surgical site  Location: Hip  Wound Location Orientation: Left   Wound Image                Wound Etiology Non-Healing Surgical   Dressing Status Breakthrough drainage noted   Cleansed Wound cleanser   Dressing/Treatment Other (Comment)  (mesalt, foam)   Dressing Change Due 06/02/21   Wound Length (cm) 3 cm   Wound Width (cm) 1.5 cm   Wound Depth (cm)    (slough CATHIE)   Wound Surface Area (cm^2) 4.5 cm^2   Post-Procedure Length (cm) 3 cm   Post-Procedure Width (cm) 1.5 cm Post-Procedure Depth (cm) 0.4 cm   Post-Procedure Surface Area (cm^2) 4.5 cm^2   Post-Procedure Volume (cm^3) 1.8 cm^3   Wound Assessment Slough   Drainage Amount Moderate   Drainage Description Serosanguinous   Wound Odor None   Camryn-Wound/Incision Assessment Blanchable erythema   Edges Undefined edges   Wound Thickness Description Full thickness             Data Review:   No results found for this or any previous visit (from the past 24 hour(s)). Assessment:     Patient Active Problem List   Diagnosis Code    Hypertension I10    COPD (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 ([facility]) J44.9    Chronic renal disease, stage 3, moderately decreased glomerular filtration rate between 30-59 mL/min/1.73 square meter ([facility]) N18.30    Acute exacerb[facility] of chronic obstructive pulmonary disease [facility]D) [facility] J44.1    Debility R53.81    Chronic respiratory failure with hypoxia ([facility]) J96.11    Tobacco dependence F17.200    Left hip pain M25.552    PAF (paroxysmal atrial fibrillation) ([facility]) I48.0    Avascular necrosis of bone of left hip ([facility]) M87.052    Acute respiratory failure with hypoxia and hypercapnia ([facility]) J96.01, J96.02    Obesity E66.9    Acute pulmonary edema ([facility]) J81.0    Anemia D64.9    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 02XA    Scrotal edema N50.89    Open wound of left hip S71.002A     66 y.o. male with open wound of left hip. Recent left hip surgery     Needs :  Serial debridement- debrided today- see note below  Good local wound care  Edema management  Nutrition optimization    Plan:   Informed consent obtained. Patient/family member explained about the need of the procedure.   Consent in chart  Debridement:   Excisional debridement of non pressure ulcer of left hip  Indication: to remove necrotic tissue/ devitalized tissue/ soft eschar/ infected tissue through subcutaneous tissue epidermis and dermis layer of wound bed  Anesthesia: Topical 2% lidocaine jelly   Instrument: Curette, Blade,     Residual Necrosis: Present and scored  Bleeding: <1ml   Hemostasis: Pressure   Patient tolerated procedure well   Procedural Pain: mild  Post - procedural pain: mild    Post debridement measurements: 1.5 x 3 cm  Surface area debrided: 4.5 sq. cm        In wound care clinic today:   Cleanse wound with NS or soap and water or commercial wound cleanser   Apply the following topically to wound bed: mesalt   Apply the following dressings: bordered foam     For Home Care/Self Care   Keep dressing dry and intact when bathing     Leave dressings in place until next visit     Patient to return for wound care in: 7 Days     PLEASE CONTACT OFFICE AS SOON AS POSSIBLE IF UNABLE TO MAKE THIS APPOINTMENT. Inspect your wounds, looking for signs of infection which may include the following:  Increase in redness  Red \"streaks\" from wound  Increase in swelling  Fever  Unusual odor  Change in the amount of wound drainage. Should you experience any significant changes in your wound(s) or have any questions regarding your home care instructions please contact the wound center or your home health company. If after regular business hours, please call your family doctor or local emergency room. Edema Control: Elevate legs as much as possible. Avoid standing in one position for more than 10 minutes. Avoid setting with legs down. Do not cross legs while sitting. Off-Loading:Frequent position changes. Do not cross legs while sitting. Shift weight every 20 minutes or more when sitting for prolonged periods of time.     Signed By: Marylene Awkward, MD     May 26, 2021

## 2021-05-26 NOTE — DISCHARGE INSTRUCTIONS
Discharge Instructions for  [address]  [address] [place], [address] [facility]  [phone] Fax [phone]    NAME:  Brenda Roger  YOB: 1945  MEDICAL RECORD NUMBER:  [id number]  DATE:  5/26/2021    Wound Cleansing:   Do not scrub or use excessive force. Topical Treatments:  Do not apply lotions, creams, or ointments to wound bed unless directed. Dressings:           Wound Location Left hip   [] Apply Primary Dressing:       [] MediHoney Gel [] Alginate with Silver [] Alginate   [] Collagen [] Collagen with Silver   [] Santyl with Moisten saline gauze     [] Hydrocolloid   [] MediHoney Alginate [] Foam with Silver   [] Foam   [] Hydrofera Blue    [] Mepilex Border    [] Moisten with Saline [] Hydrogel [] Mepitel     [] Bactroban/Mupirocin [] Polysporin  [] Other:    [] Pack wound loosely with  [] Iodoform   [] Plain Packing  [] Other   [] Cover and Secure with:     [] Gauze [] Shireen [] Kerlix   [] Ace Wrap [] Cover Roll Tape [] ABD     [] Other:    Avoid contact of tape with skin.   [] Change dressing: [] Daily    [] Every Other Day [] Three times per week   [] Once a week [x] Do Not Change Dressing   [] Other:     Dietary:  [x] Diet as tolerated: [] Calorie Diabetic Diet: [] No Added Salt:  [] Increase Protein: [] Other:   Activity:  [x] Activity as tolerated:  [] Patient has no activity restrictions     [] Strict Bedrest: [] Remain off Work:     [] May return to full duty work:                                   [] Return to work with restrictions:             Return Appointment:  [] Wound and dressing supply provider:   [] ECF or Home Healthcare:  [] Wound Assessment: [] Physician or NP scheduled for Wound Assessment:   [x] Return Appointment: With MD  in  1 Week(s)  [] Ordered tests:      Electronically signed Melody Mccann RN on 5/26/2021 at 2:54 PM     [address] [place] [address] Information: Should you experience any significant changes in your wound(s) or have questions about your wound care, please contact the [address] at [address] [place] [address] 8:00 am - 4:30. If you need help with your wound outside these hours and cannot wait until we are again available, contact your PCP or go to the hospital emergency room. PLEASE NOTE: IF YOU ARE UNABLE TO OBTAIN WOUND SUPPLIES, CONTINUE TO USE THE SUPPLIES YOU HAVE AVAILABLE UNTIL YOU ARE ABLE TO REACH US. IT IS MOST IMPORTANT TO KEEP THE WOUND COVERED AT ALL TIMES.

## 2021-05-28 NOTE — PROGRESS NOTES
Care Transitions Initial Call    Call within 2 business days of discharge: Yes     Patient: Sanya Simms Patient : 1945 MRN: [id number]    Last Discharge [address]       Complaint Diagnosis Description Type Department Provider     Post OP Complication Cellulitis, unspecified cellulitis site . .. ED to Hosp-Admission (Discharged) (ADMIT) [facility] Tone Jordan MD; Onofre. .. This patient was discharged from [facility] to [facility] [place] [facility]   on 21. Hewas discharged home from [facility] on 21. Was this an external facility discharge? No     Challenges to be reviewed by the provider   Additional needs identified to be addressed with provider no  none         Method of communication with provider : none    Discussed COVID-19 related testing which was done regularly while at [facility] at this time. Test results were negative. Patient informed of results, if available? yes     Advance Care Planning:   Does patient have an Advance Directive: yes, reviewed and current     Inpatient Readmission Risk score: Unplanned Readmit Risk Score: 44    Was this a readmission? no   Patient stated reason for the admission: n/a    Patients top risk factors for readmission: medical condition-Resp Failure   Interventions to address risk factors: Scheduled appointment with PCP-has already seen his PCP    Care Transition Nurse (CTN) contacted the patient by telephone to perform post hospital discharge assessment. Verified name and  with patient as identifiers. Provided introduction to self, and explanation of the CTN role. Patient requested that I speak with his wife, Luis Felipe Scanlon, as the patient is hard of hearing. CTN reviewed discharge instructions, medical action plan and red flags with wife who verbalized understanding. Were discharge instructions available to patient? yes.  Reviewed appropriate site of care based on symptoms and resources available to patient including: PCP, Specialist, Urgent [address] Wife given an opportunity to ask questions and does not have any further questions or concerns at this time. The wife agrees to contact the PCP office for questions related to their healthcare. Medication reconciliation was not performed  - wife states the home health nurse reviewed all of his medications with them. Advised obtaining a 90-day supply of all daily and as-needed medications. Referral to Pharm D needed: no     Home Health/Outpatient orders at discharge: home health care  [address] [place] [address] Way: [facility]  Date of initial visit: 5/22/21    Durable Medical Equipment ordered at discharge: None  [facility] n[facility]a    Covid Risk Education    Educated patient about risk for severe COVID-19 due to risk factors according to CDC guidelines. ACM reviewed discharge instructions, medical action plan and red flag symptoms wife who verbalized understanding. Discussed COVID vaccination status yes. Education provided on COVID-19 vaccination as appropriate  - Wife states she and her  are both fully vaccinated. Discussed exposure protocols and quarantine with CDC Guidelines. Patient was given an opportunity to verbalize any questions and concerns and agrees to contact ACM or health care provider for questions related to their healthcare. Discussed follow-up appointments. If no appointment was previously scheduled, appointment scheduling offered: no Is follow up appointment scheduled within 7 days of discharge? yes   [facility] follow up appointment(s):   Future Appointments   Date Time Provider Mike Lamar   6/2/2021  2:45 PM Mary Barillas MD [facility]     [facility] follow up appointment(s): Saw PCP already    Plan for follow-up call in 5-7 days based on severity of symptoms and risk factors.   Plan for next call: check on how patient and his wife are doing. CTN provided contact information for future needs.     Goals Addressed    None

## 2021-06-02 ENCOUNTER — HOSPITAL ENCOUNTER (OUTPATIENT)
Dept: WOUND CARE | Age: 76
Discharge: HOME | End: 2021-06-02
Attending: HOSPITALIST
Payer: COMMERCIAL

## 2021-06-02 VITALS — RESPIRATION RATE: 24 BRPM | HEART RATE: 110 BPM | TEMPERATURE: 98 F | OXYGEN SATURATION: 99 %

## 2021-06-02 PROCEDURE — 11042 DBRDMT SUBQ TIS 1ST 20SQCM/<: CPT

## 2021-06-02 NOTE — DISCHARGE INSTRUCTIONS
Discharge Instructions for  [facility]  [address] [facility], [address] [facility]  Telephone: [phone]    NAME:  Nikos Youngblood  YOB: 1945  MEDICAL RECORD NUMBER:  [id number]  DATE:  6/2/2021    Wound Cleansing:   Do not scrub or use excessive force. Cleanse wound prior to applying a clean dressing with:  [] Normal Saline [x] Keep Wound Dry in Shower    [] Wound Cleanser   [] Cleanse wound with Mild Soap & Water  [] May Shower at Discharge   [] Other:         Edema Control:  Apply: [x] Compression Stocking [x]Right Leg [x]Left Leg   [x] Elevate leg(s) above the level of the heart when sitting. [x] Avoid prolonged standing in one place. Compression:  Apply: [] Multilayer Compression Wrap Applied in Clinic []RightLeg []Left Leg   [] Multi-layer compression. Do not get leg(s) with wrap wet. If wraps become too tight call the center or completely remove the wrap. [x] Elevate leg(s) above the level of the heart when sitting. [x] Avoid prolonged standing in one place. Dietary:  [x] Diet as tolerated: [] Calorie Diabetic Diet: [] No Added Salt:  [x] Increase Protein: [] Other:   Activity:  [x] Activity as tolerated:  [] Patient has no activity restrictions     [] Strict Bedrest: [] Remain off Work:     [] May return to full duty work:                                   [] Return to work with restrictions:             Return Appointment:  [x] Wound Assessment: [x] Physician or NP scheduled for Wound Assessment:   [x] Return Appointment: With Dr Pat Horvath  in  1 Week(s)        Electronically signed Alana Hodges RN on 6/2/2021 at [address] [place] [address] Shira Information: Should you experience any significant changes in your wound(s) or have questions about your wound care, please contact the [address] [place] [address] at [address] [place] [address] 8:00 am - 4:30.   If you need help with your wound outside these hours and cannot wait until we are again available, contact your PCP or go to the hospital emergency room. PLEASE NOTE: IF YOU ARE UNABLE TO OBTAIN WOUND SUPPLIES, CONTINUE TO USE THE SUPPLIES YOU HAVE AVAILABLE UNTIL YOU ARE ABLE TO REACH US. IT IS MOST IMPORTANT TO KEEP THE WOUND COVERED AT ALL TIMES.      Physician Signature:_______________________    Date: ___________ Time:  ____________

## 2021-06-02 NOTE — WOUND CARE
06/02/21 1624   Wound Hip Left   Date First Assessed/Time First Assessed: 05/26/21 1440   Present on Hospital Admission: Yes  Primary Wound Type: Open incision/surgical site  Location: Hip  Wound Location Orientation: Left   Wound Image    Wound Etiology Non-Healing Surgical   Dressing Status Breakthrough drainage noted   Cleansed Wound cleanser   Dressing/Treatment Silicone border;Tape/Soft cloth adhesive tape  (mesalt)   Dressing Change Due 06/09/21   Wound Length (cm) 2 cm   Wound Width (cm) 1 cm   Wound Depth (cm) 0.5 cm   Wound Surface Area (cm^2) 2 cm^2   Change in Wound Size % 55.56   Wound Volume (cm^3) 1 cm^3   Post-Procedure Length (cm) 2.1 cm   Post-Procedure Width (cm) 1.1 cm   Post-Procedure Depth (cm) 0.5 cm   Post-Procedure Surface Area (cm^2) 2.31 cm^2   Post-Procedure Volume (cm^3) 1.16 cm^3   Wound Assessment Slough   Drainage Amount Small   Drainage Description Serosanguinous   Wound Odor None   Camryn-Wound/Incision Assessment Blanchable erythema   Edges Undefined edges   Wound Thickness Description Full thickness

## 2021-06-09 ENCOUNTER — HOSPITAL ENCOUNTER (OUTPATIENT)
Dept: WOUND CARE | Age: 76
Discharge: HOME | End: 2021-06-09
Attending: HOSPITALIST
Payer: COMMERCIAL

## 2021-06-09 VITALS
DIASTOLIC BLOOD PRESSURE: 48 MMHG | TEMPERATURE: 98.5 F | HEART RATE: 86 BPM | OXYGEN SATURATION: 98 % | SYSTOLIC BLOOD PRESSURE: 136 MMHG | RESPIRATION RATE: 22 BRPM

## 2021-06-09 PROCEDURE — 11042 DBRDMT SUBQ TIS 1ST 20SQCM/<: CPT

## 2021-06-10 ENCOUNTER — PATIENT OUTREACH (OUTPATIENT)
Dept: CASE MANAGEMENT | Age: 76
End: 2021-06-10

## 2021-06-14 NOTE — WOUND CARE
06/09/21 1342   Wound Hip Left   Date First Assessed/Time First Assessed: 05/26/21 1440   Present on Hospital Admission: Yes  Primary Wound Type: Open incision/surgical site  Location: Hip  Wound Location Orientation: Left   Wound Image    Wound Etiology Non-Healing Surgical   Dressing Status Breakthrough drainage noted   Cleansed Wound cleanser   Dressing/Treatment Foam   Dressing Change Due 06/16/21   Wound Length (cm) 3 cm   Wound Width (cm) 1.5 cm   Wound Depth (cm)   (CATHIE d/t slough in wound bed )   Wound Surface Area (cm^2) 4.5 cm^2   Change in Wound Size % 0   Post-Procedure Length (cm) 3 cm   Post-Procedure Width (cm) 1.5 cm   Post-Procedure Depth (cm) 0.9 cm   Post-Procedure Surface Area (cm^2) 4.5 cm^2   Post-Procedure Volume (cm^3) 4.05 cm^3   Wound Assessment Slough   Drainage Amount Moderate   Drainage Description Serosanguinous   Wound Odor None   Camryn-Wound/Incision Assessment Blanchable erythema   Edges Undefined edges   Wound Thickness Description Full thickness

## 2021-06-16 ENCOUNTER — HOSPITAL ENCOUNTER (OUTPATIENT)
Dept: WOUND CARE | Age: 76
Discharge: HOME | End: 2021-06-16
Attending: HOSPITALIST
Payer: COMMERCIAL

## 2021-06-16 PROCEDURE — 11042 DBRDMT SUBQ TIS 1ST 20SQCM/<: CPT

## 2021-06-17 NOTE — WOUND CARE
[facility]  Follow up note. Chief Complaint:  Naomi Iyer is a 66 y.o.  male who presents for follow up of left hip open wound along incision site. 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 Cardiovascular: No chest pain or pressure. No palpitations. Pulmonary: No shortness of breath. Gastrointestinal: No nausea, vomiting. Derm: See above                Physical Exam:     Visit Vitals  Pulse (!) 110   Temp 98 °F (36.7 °C)   Resp 24   SpO2 99%     General: well developed, well nourished, pleasant , NAD. Hygiene good  Psych: cooperative. Pleasant. No anxiety or depression. Normal mood and affect. Neuro: alert and oriented to person/place/situation. Otherwise nonfocal.  Derm: Skin turgor normal for age, dry skin  HEENT: Normocephalic, atraumatic. EOMI. Conjunctiva clear. No scleral icterus. Chest: Good air entry bilaterally. Respirations non labored  Cardio[de-identified] Normal heart sounds,no rubs, murmurs or gallops  Abdomen: Soft, nontender, nondistended, normoactive bowel sounds  Lower extremities: color normal; temperature normal. Calves are supple, nontender.  Capillary refill <3 sec      Wound Description:   Wound Length:      Wound Width :     Wound Depth :      06/02/21 1624   Wound Hip Left   Date First Assessed/Time First Assessed: 05/26/21 1440   Present on Hospital Admission: Yes  Primary Wound Type: Open incision/surgical site  Location: Hip  Wound Location Orientation: Left   Wound Image    Wound Etiology Non-Healing Surgical   Dressing Status Breakthrough drainage noted   Cleansed Wound cleanser   Dressing/Treatment Silicone border;Tape/Soft cloth adhesive tape  (mesalt)   Dressing Change Due 06/09/21   Wound Length (cm) 2 cm   Wound Width (cm) 1 cm   Wound Depth (cm) 0.5 cm   Wound Surface Area (cm^2) 2 cm^2   Change in Wound Size % 55.56   Wound Volume (cm^3) 1 cm^3 Post-Procedure Length (cm) 2.1 cm   Post-Procedure Width (cm) 1.1 cm   Post-Procedure Depth (cm) 0.5 cm   Post-Procedure Surface Area (cm^2) 2.31 cm^2   Post-Procedure Volume (cm^3) 1.16 cm^3   Wound Assessment Slough   Drainage Amount Small   Drainage Description Serosanguinous   Wound Odor None   Camryn-Wound/Incision Assessment Blanchable erythema   Edges Undefined edges   Wound Thickness Description Full thickness         Data Review:   No results found for this or any previous visit (from the past 24 hour(s)). Assessment:     Patient Active Problem List   Diagnosis Code    Hypertension I10    COPD (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 ([facility]) J44.9    Chronic renal disease, stage 3, moderately decreased glomerular filtration rate between 30-59 mL/min/1.73 square meter ([facility]) N18.30    Acute exacerbation of chronic obstructive pulmonary disease (COPD) [facility] J44.1    Debility R53.81    Chronic respiratory failure with hypoxia ([facility]) J96.11    Tobacco dependence F17.200    Left hip pain M25.552    PAF (paroxysmal atrial fibrillation) ([facility]) I48.0    Avascular necrosis of bone of left hip ([facility]) M87.052    Acute respiratory failure with hypoxia and hypercapnia ([facility]) J96.01, J96.02    Obesity E66.9    Acute pulmonary edema ([facility]) J81.0    Anemia D64.9    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 02XA    Scrotal edema N50.89    Open wound of left hip S71.002A     66 y.o. male with open wound of left hip. Recent left hip surgery     Needs :  Serial debridement- debrided today- see note below  Good local wound care  Edema management  Nutrition optimization    Plan:   Informed consent obtained. Patient/family member explained about the need of the procedure.   Consent in chart  Debridement:   Excisional debridement of non pressure ulcer of left hip  Indication: to remove necrotic tissue/ devitalized tissue/ soft eschar/ infected tissue through subcutaneous tissue epidermis and dermis layer of wound bed  Anesthesia: Topical 2% lidocaine jelly   Instrument: Curette, Blade,     Residual Necrosis: Present and scored  Bleeding: <1ml   Hemostasis: Pressure   Patient tolerated procedure well   Procedural Pain: mild  Post - procedural pain: mild    Post debridement measurements: 1.1 x 2.1 cm  Surface area debrided: 2.2 sq. cm      In wound care clinic today: 06/02/2021   Cleanse wound with NS or soap and water or commercial wound cleanser   Apply the following topically to wound bed: 2% lidocaine topical, bacitracin   Apply the following to rachid-wound:   Apply the following dressings:  Mesalt,silicone border,tape     Keep dressing dry and intact when bathing       Leave dressings in place until next visit     Patient to return for wound care in: 7 Days     PLEASE CONTACT OFFICE AS SOON AS POSSIBLE IF UNABLE TO MAKE THIS APPOINTMENT. Inspect your wounds, looking for signs of infection which may include the following:  Increase in redness  Red \"streaks\" from wound  Increase in swelling  Fever  Unusual odor  Change in the amount of wound drainage. Should you experience any significant changes in your wound(s) or have any questions regarding your home care instructions please contact the wound center or your home health company. If after regular business hours, please call your family doctor or local emergency room. Edema Control: Elevate legs as much as possible. Avoid standing in one position for more than 10 minutes. Avoid setting with legs down. Do not cross legs while sitting. Off-Loading:Frequent position changes. Do not cross legs while sitting. Shift weight every 20 minutes or more when sitting for prolonged periods of time.       Signed By: Agata Horn MD     June 17, 2021

## 2021-06-18 NOTE — PROGRESS NOTES
Contacted patient for Transitions of Care Coordination  follow up x three. No answer. Left message  Requesting return call. Contact information provided. Patient has already seen PCP and is past 30 days post hospital so episode resolved at this time.        Hiram Hull RN

## 2021-06-21 VITALS
SYSTOLIC BLOOD PRESSURE: 133 MMHG | TEMPERATURE: 98.1 F | DIASTOLIC BLOOD PRESSURE: 55 MMHG | HEART RATE: 80 BPM | RESPIRATION RATE: 22 BRPM | OXYGEN SATURATION: 97 %

## 2021-06-21 NOTE — WOUND CARE
06/16/21 1446   Wound Hip Left   Date First Assessed/Time First Assessed: 05/26/21 1440   Present on Hospital Admission: Yes  Primary Wound Type: Open incision/surgical site  Location: Hip  Wound Location Orientation: Left   Wound Image     Wound Etiology Non-Healing Surgical   Dressing Status Breakthrough drainage noted   Cleansed Wound cleanser   Dressing/Treatment Foam;Silicone border;Collagen;Alginate with Ag   Dressing Change Due 06/16/21   Wound Length (cm) 1 cm   Wound Width (cm) 1.4 cm   Wound Depth (cm) 0.4 cm   Wound Surface Area (cm^2) 1.4 cm^2   Change in Wound Size % 68.89   Wound Volume (cm^3) 0.56 cm^3   Wound Healing % 44   Post-Procedure Length (cm) 1.5 cm   Post-Procedure Width (cm) 1.6 cm   Post-Procedure Depth (cm) 0.5 cm   Post-Procedure Surface Area (cm^2) 2.4 cm^2   Post-Procedure Volume (cm^3) 1.2 cm^3   Wound Assessment Slough   Drainage Amount Small   Drainage Description Serosanguinous   Wound Odor None   Camryn-Wound/Incision Assessment Blanchable erythema   Edges Undefined edges   Wound Thickness Description Full thickness

## 2021-06-23 ENCOUNTER — HOSPITAL ENCOUNTER (OUTPATIENT)
Dept: WOUND CARE | Age: 76
Discharge: HOME | End: 2021-06-23
Attending: HOSPITALIST
Payer: COMMERCIAL

## 2021-06-23 VITALS
DIASTOLIC BLOOD PRESSURE: 44 MMHG | TEMPERATURE: 98.6 F | OXYGEN SATURATION: 100 % | RESPIRATION RATE: 22 BRPM | HEART RATE: 87 BPM | SYSTOLIC BLOOD PRESSURE: 120 MMHG

## 2021-06-23 PROCEDURE — 99213 OFFICE O/P EST LOW 20 MIN: CPT

## 2021-06-23 RX ORDER — DOXYCYCLINE 100 MG/1
100 CAPSULE ORAL 2 TIMES DAILY
Qty: 20 CAPSULE | Refills: 0 | Status: SHIPPED | OUTPATIENT
Start: 2021-06-23 | End: 2021-07-03

## 2021-06-23 NOTE — WOUND CARE
06/23/21 1431   Wound Hip Left   Date First Assessed/Time First Assessed: 05/26/21 1440   Present on Hospital Admission: Yes  Primary Wound Type: Open incision/surgical site  Location: Hip  Wound Location Orientation: Left   Wound Image    Wound Etiology Non-Healing Surgical   Dressing Status Other (Comment)  (pt states it fell off, pt presents with wound uncovered )   Cleansed Cleansed with saline   Dressing/Treatment Other (Comment)  (santyl)   Dressing Change Due 06/24/21   Wound Length (cm) 1 cm   Wound Width (cm) 2 cm   Wound Depth (cm) 1 cm   Wound Surface Area (cm^2) 2 cm^2   Change in Wound Size % 55.56   Wound Volume (cm^3) 2 cm^3   Wound Healing % -100   Distance Tunneling (cm) 2.5 cm   Direction of Tunnel 2 o'clock   Wound Assessment Slough   Drainage Amount Moderate   Drainage Description Serous   Wound Odor None   Camryn-Wound/Incision Assessment Blanchable erythema; Warm   Edges Undefined edges   Wound Thickness Description Full thickness

## 2021-06-23 NOTE — WOUND CARE
[address] Hospitalpatsy Rahman  Ref# [id number]  Fax: [facility]:     Shree Kidd [address] [address]  [facility] [phone] [address] [facility] NUMBER [id number]    Patient Information:      Patrick Vo [address] [id number]   [phone]   : 1945  AGE: 66 y.o. GENDER: male   TODAYS DATE:  2021    Insurance:      PRIMARY INSURANCE:  [id number] - [facility]    Payor/Plan Subscr  Sex Relation Sub. Ins. ID Effective Group Num   1. [place] MEDICARE -* Vishnu Marcial 1945 Male Self [id number]                                    [address]   2.  [facility] 1945 [facility] Self [id number]                                   [address] [address]       Patient Wound Information:      Problem List Items Addressed This Visit     None          WOUNDS REQUIRING DRESSING SUPPLIES:     Wound Leg upper Anterior;Left;Proximal large red skin tear (Active)   Number of days: 66       Wound Hip Left (Active)   Wound Image   21 1431   Wound Etiology Non-Healing Surgical 21 1431   Dressing Status Other (Comment) 21 1431   Cleansed Cleansed with saline 21 1431   Dressing/Treatment Other (Comment) 21 1431   Dressing Change Due 21 1431   Wound Length (cm) 1 cm 21 1431   Wound Width (cm) 2 cm 21 1431   Wound Depth (cm) 1 cm 21 1431   Wound Surface Area (cm^2) 2 cm^2 21 1431   Change in Wound Size % 55.56 21 1431   Wound Volume (cm^3) 2 cm^3 21 1431   Wound Healing % -100 21 1431   Post-Procedure Length (cm) 1.5 cm 21 1446   Post-Procedure Width (cm) 1.6 cm 21 1446   Post-Procedure Depth (cm) 0.5 cm 21 1446   Post-Procedure Surface Area (cm^2) 2.4 cm^2 21 1446   Post-Procedure Volume (cm^3) 1.2 cm^3 21 1446   Distance Tunneling (cm) 2.5 cm 21 1431   Direction of Tunnel 2 o'clock 06/23/21 1431   Wound Assessment [facility] 06/23/21 1431   Drainage Amount Moderate 06/23/21 1431   Drainage Description Serous 06/23/21 1431   Wound Odor None 06/23/21 1431   Camryn-Wound/Incision Assessment Blanchable erythema; Warm 06/23/21 1431   Edges Undefined edges 06/23/21 1431   Wound Thickness Description Full thickness 06/23/21 1431   Number of days: 28        Supplies Requested :      WOUND #:1   PRIMARY DRESSING:  Other: super absorbent with silcone border    None     FREQUENCY OF DRESSING CHANGES:  Daily     ADDITIONAL ITEMS:  [] Gloves Small  [x] Gloves Medium [] Gloves Large [] Gloves XLarge  [] Tape 1\" [x] Tape 2\" [] Tape 3\"  [] Medipore Tape  [x] Saline  [] Skin Prep   [] Adhesive Remover   [] Cotton Tip Applicators   [x] Other: tongue depressors to apply ointment if available    Patient Wound(s) Debrided: [x] Yes if yes please add date 6/16/2021   [] No    Debribement Type: Excisional/Sharp    Patient currently being seen by Home Health: [] Yes   [x] No    Duration for needed supplies:  []15  [x]30  []60  []90 Days    Electronically signed by Melody Brumfield RN on 6/23/2021 at 3:18 PM    Provider Information:      PROVIDER'S NAME: Dr. Stef Lemon    NPI: [id number]

## 2021-06-23 NOTE — DISCHARGE INSTRUCTIONS
Discharge Instructions for  [address]  [address], [facility]  [phone] Fax [phone]    NAME:  Brandi Garland  YOB: 1945  MEDICAL RECORD NUMBER:  [id number]  DATE:  6/23/2021    Wound Cleansing:   Do not scrub or use excessive force. Topical Treatments:  Do not apply lotions, creams, or ointments to wound bed unless directed.       Dressings:           Wound Location L hip   [] Apply Primary Dressing:       [] MediHoney Gel [] Alginate with Silver [] Alginate   [] Collagen [] Collagen with Silver   [x] Santyl with Moisten saline gauze     [] Hydrocolloid   [] MediHoney Alginate [] Foam with Silver   [] Foam   [] Hydrofera Blue    [] Mepilex Border    [] Moisten with Saline [] Hydrogel [] Mepitel     [] Bactroban/Mupirocin [] Polysporin  [] Other:    [] Pack wound loosely with  [] Iodoform   [] Plain Packing  [] Other   [x] Cover and Secure with:     [] Gauze [] Shireen [] Kerlix   [] Ace Wrap [] Cover Roll Tape [] ABD     [x] Other: dry dressing   [x] Change dressing: [x] Daily    [] Every Other Day [] Three times per week   [] Once a week [] Do Not Change Dressing   [] Other:     Dietary:  [x] Diet as tolerated: [] Calorie Diabetic Diet: [] No Added Salt:  [] Increase Protein: [] Other:   Activity:  [x] Activity as tolerated:  [] Patient has no activity restrictions     [] Strict Bedrest: [] Remain off Work:     [] May return to full duty work:                                   [] Return to work with restrictions:             Return Appointment:  [] Wound and dressing supply provider:   [] ECF or Home Healthcare:  [] Wound Assessment: [] Physician or NP scheduled for Wound Assessment:   [x] Return Appointment: With MD  in  1 Week(s)  [] Ordered tests:      Electronically signed Melody Mccann RN on 6/23/2021 at 3:27 PM     [address] [place] [address] Information: Should you experience any significant changes in your wound(s) or have questions about your wound care, please contact the [address] at [address] [place] [address] 8:00 am - 4:30. If you need help with your wound outside these hours and cannot wait until we are again available, contact your PCP or go to the hospital emergency room. PLEASE NOTE: IF YOU ARE UNABLE TO OBTAIN WOUND SUPPLIES, CONTINUE TO USE THE SUPPLIES YOU HAVE AVAILABLE UNTIL YOU ARE ABLE TO REACH US. IT IS MOST IMPORTANT TO KEEP THE WOUND COVERED AT ALL TIMES.

## 2021-06-28 NOTE — WOUND CARE
[facility]  Follow up note. Chief Complaint:  Brenda Roger is a 66 y.o.  male who presents for follow up of left hip open wound along incision site. 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 Cardiovascular: No chest pain or pressure. No palpitations. Pulmonary: No shortness of breath. Gastrointestinal: No nausea, vomiting. Derm: See above                Physical Exam:     Visit Vitals  BP (!) 136/48   Pulse 86   Temp 98.5 °F (36.9 °C)   Resp 22   SpO2 98%     General: well developed, well nourished, pleasant , NAD. Hygiene good  Psych: cooperative. Pleasant. No anxiety or depression. Normal mood and affect. Neuro: alert and oriented to person/place/situation. Otherwise nonfocal.  Derm: Skin turgor normal for age, dry skin  HEENT: Normocephalic, atraumatic. EOMI. Conjunctiva clear. No scleral icterus. Chest: Good air entry bilaterally. Respirations non labored  Cardio[de-identified] Normal heart sounds,no rubs, murmurs or gallops  Abdomen: Soft, nontender, nondistended, normoactive bowel sounds  Lower extremities: color normal; temperature normal. Calves are supple, nontender.  Capillary refill <3 sec      Wound Description:   Wound Length:      Wound Width :     Wound Depth :     Wound Hip Left   Date First Assessed/Time First Assessed: 05/26/21 1440   Present on Hospital Admission: Yes  Primary Wound Type: Open incision/surgical site  Location: Hip  Wound Location Orientation: Left   Wound Image    Wound Etiology Non-Healing Surgical   Dressing Status Breakthrough drainage noted   Cleansed Wound cleanser   Dressing/Treatment Foam   Dressing Change Due 06/16/21   Wound Length (cm) 3 cm   Wound Width (cm) 1.5 cm   Wound Depth (cm)    (CATHIE d/t slough in wound bed )   Wound Surface Area (cm^2) 4.5 cm^2   Change in Wound Size % 0   Post-Procedure Length (cm) 3 cm   Post-Procedure Width (cm) 1.5 cm Post-Procedure Depth (cm) 0.9 cm   Post-Procedure Surface Area (cm^2) 4.5 cm^2   Post-Procedure Volume (cm^3) 4.05 cm^3   Wound Assessment Slough   Drainage Amount Moderate   Drainage Description Serosanguinous   Wound Odor None   Camryn-Wound/Incision Assessment Blanchable erythema   Edges Undefined edges   Wound Thickness Description Full thickness        Data Review:   No results found for this or any previous visit (from the past 24 hour(s)). Assessment:     Patient Active Problem List   Diagnosis Code    Hypertension I10    COPD (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 ([facility]) J44.9    Chronic renal disease, stage 3, moderately decreased glomerular filtration rate between 30-59 mL/min/1.73 square meter ([facility]) N18.30    Acute exacerbation of chronic obstructive pulmonary disease (COPD) [facility] J44.1    Debility R53.81    Chronic respiratory failure with hypoxia ([facility]) J96.11    Tobacco dependence F17.200    Left hip pain M25.552    PAF (paroxysmal atrial fibrillation) ([facility]) I48.0    Avascular necrosis of bone of left hip ([facility]) M87.052    Acute respiratory failure with hypoxia and hypercapnia ([facility]) J96.01, J96.02    Obesity E66.9    Acute pulmonary edema ([facility]) J81.0    Anemia D64.9    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 02XA    Scrotal edema N50.89    Open wound of left hip S71.002A     66 y.o. male with open wound of left hip. Recent left hip surgery     Needs :  Serial debridement- debrided today- see note below  Good local wound care  Edema management  Nutrition optimization    Plan:   Informed consent obtained. Patient/family member explained about the need of the procedure.   Consent in chart  Debridement:   Excisional debridement of non pressure ulcer of left hip  Indication: to remove necrotic tissue/ devitalized tissue/ soft eschar/ infected tissue through subcutaneous tissue epidermis and dermis layer of wound bed  Anesthesia: Topical 2% lidocaine jelly   Instrument: Curette, Blade,     Residual Necrosis: Present and scored  Bleeding: <1ml   Hemostasis: Pressure   Patient tolerated procedure well   Procedural Pain: mild  Post - procedural pain: mild    Post debridement measurements: 1.1.5 x 3 cm  Surface area debrided: 4.5 sq. cm      In wound care clinic today: 06/09/2021   Cleanse wound with NS or soap and water or commercial wound cleanser   Apply the following topically to wound bed: 2% lidocaine topical, bacitracin   Apply the following to rachid-wound:   Apply the following dressings:  Mesalt,silicone border,tape     Keep dressing dry and intact when bathing       Leave dressings in place until next visit     Patient to return for wound care in: 7 Days     PLEASE CONTACT OFFICE AS SOON AS POSSIBLE IF UNABLE TO MAKE THIS APPOINTMENT. Inspect your wounds, looking for signs of infection which may include the following:  Increase in redness  Red \"streaks\" from wound  Increase in swelling  Fever  Unusual odor  Change in the amount of wound drainage. Should you experience any significant changes in your wound(s) or have any questions regarding your home care instructions please contact the wound center or your home health company. If after regular business hours, please call your family doctor or local emergency room. Edema Control: Elevate legs as much as possible. Avoid standing in one position for more than 10 minutes. Avoid setting with legs down. Do not cross legs while sitting. Off-Loading:Frequent position changes. Do not cross legs while sitting. Shift weight every 20 minutes or more when sitting for prolonged periods of time.       Signed By: Ronak Ramesh MD     June 28, 2021

## 2021-06-30 ENCOUNTER — HOSPITAL ENCOUNTER (OUTPATIENT)
Dept: WOUND CARE | Age: 76
Discharge: HOME | End: 2021-06-30
Attending: HOSPITALIST
Payer: COMMERCIAL

## 2021-06-30 PROCEDURE — 99213 OFFICE O/P EST LOW 20 MIN: CPT

## 2021-07-01 VITALS
SYSTOLIC BLOOD PRESSURE: 117 MMHG | OXYGEN SATURATION: 100 % | DIASTOLIC BLOOD PRESSURE: 40 MMHG | HEART RATE: 81 BPM | TEMPERATURE: 98.5 F | RESPIRATION RATE: 22 BRPM

## 2021-07-01 NOTE — DISCHARGE INSTRUCTIONS
Discharge Instructions for  [address]  [address], [address] [facility]  [phone] Fax [phone]    NAME:  Nickolas Nesbitt  YOB: 1945  MEDICAL RECORD NUMBER:  [id number]  DATE:  6/30/2021    Wound Cleansing:   Do not scrub or use excessive force. Topical Treatments:  Do not apply lotions, creams, or ointments to wound bed unless directed. Dressings:           Wound Location L hip   [] Apply Primary Dressing:       [] MediHoney Gel [] Alginate with Silver [] Alginate   [] Collagen [] Collagen with Silver   [x] Santyl with Moisten saline gauze     [] Hydrocolloid   [] MediHoney Alginate [] Foam with Silver   [] Foam   [] Hydrofera Blue    [] Mepilex Border    [] Moisten with Saline [] Hydrogel [] Mepitel     [] Bactroban/Mupirocin [] Polysporin  [] Other:    [] Pack wound loosely with  [] Iodoform   [] Plain Packing  [] Other   [] Cover and Secure with:     [] Gauze [] Shireen [] Kerlix   [] Ace Wrap [] Cover Roll Tape [] ABD     [] Other:    Avoid contact of tape with skin.   [] Change dressing: [] Daily    [] Every Other Day [] Three times per week   [] Once a week [] Do Not Change Dressing   [] Other:     Dietary:  [x] Diet as tolerated: [] Calorie Diabetic Diet: [] No Added Salt:  [] Increase Protein: [] Other:   Activity:  [x] Activity as tolerated:  [] Patient has no activity restrictions     [] Strict Bedrest: [] Remain off Work:     [] May return to full duty work:                                   [] Return to work with restrictions:             Return Appointment:  [] Wound and dressing supply provider:   [] ECF or Home Healthcare:  [] Wound Assessment: [] Physician or NP scheduled for Wound Assessment:   [x] Return Appointment: With MD  in  1 Week(s)  [] Ordered tests:      Electronically signed Melody Mccann RN on 7/1/2021 at 10:14 AM     [address] [place] [address] Information: Should you experience any significant changes in your wound(s) or have questions about your wound care, please contact the [address] at [address] [place] [address] 8:00 am - 4:30. If you need help with your wound outside these hours and cannot wait until we are again available, contact your PCP or go to the hospital emergency room. PLEASE NOTE: IF YOU ARE UNABLE TO OBTAIN WOUND SUPPLIES, CONTINUE TO USE THE SUPPLIES YOU HAVE AVAILABLE UNTIL YOU ARE ABLE TO REACH US. IT IS MOST IMPORTANT TO KEEP THE WOUND COVERED AT ALL TIMES.

## 2021-07-01 NOTE — WOUND CARE
06/30/21 1512   Wound Hip Left   Date First Assessed/Time First Assessed: 05/26/21 1440   Present on Hospital Admission: Yes  Primary Wound Type: Open incision/surgical site  Location: Hip  Wound Location Orientation: Left   Wound Image    Wound Etiology Non-Healing Surgical   Dressing Status Intact   Cleansed Cleansed with saline   Dressing/Treatment Other (Comment)  (santyl)   Dressing Change Due 07/07/21   Wound Length (cm) 1.5 cm   Wound Width (cm) 2 cm   Wound Depth (cm) 0.5 cm   Wound Surface Area (cm^2) 3 cm^2   Change in Wound Size % 33.33   Wound Volume (cm^3) 1.5 cm^3   Wound Healing % -50   Wound Assessment Slough   Drainage Amount Small   Drainage Description Serous; Yellow   Wound Odor None   Camryn-Wound/Incision Assessment Intact   Edges Unattached edges   Wound Thickness Description Full thickness

## 2021-07-07 ENCOUNTER — HOSPITAL ENCOUNTER (OUTPATIENT)
Dept: WOUND CARE | Age: 76
Discharge: HOME | End: 2021-07-07
Attending: HOSPITALIST
Payer: COMMERCIAL

## 2021-07-07 VITALS
DIASTOLIC BLOOD PRESSURE: 54 MMHG | SYSTOLIC BLOOD PRESSURE: 121 MMHG | RESPIRATION RATE: 22 BRPM | HEART RATE: 78 BPM | TEMPERATURE: 98.5 F | OXYGEN SATURATION: 100 %

## 2021-07-07 PROCEDURE — 99213 OFFICE O/P EST LOW 20 MIN: CPT

## 2021-07-07 NOTE — WOUND CARE
07/07/21 1444   Wound Hip Left   Date First Assessed/Time First Assessed: 05/26/21 1440   Present on Hospital Admission: Yes  Primary Wound Type: Open incision/surgical site  Location: Hip  Wound Location Orientation: Left   Wound Image    Wound Etiology Non-Healing Surgical   Dressing Status Intact   Cleansed Wound cleanser   Dressing/Treatment Other (Comment)   Dressing Change Due 07/08/21   Wound Length (cm) 1.5 cm   Wound Width (cm) 2 cm   Wound Depth (cm) 0.3 cm   Wound Surface Area (cm^2) 3 cm^2   Change in Wound Size % 33.33   Wound Volume (cm^3) 0.9 cm^3   Wound Healing % 10   Wound Assessment Pale granulation tissue;Slough   Drainage Amount Moderate   Drainage Description Serosanguinous   Wound Odor None   Camryn-Wound/Incision Assessment Intact   Edges Unattached edges   Wound Thickness Description Full thickness

## 2021-07-10 NOTE — WOUND CARE
[address] [facility]  Follow up note. Chief Complaint:  Sergio Handley is a 66 y.o.  male who presents for follow up of left hip open wound along incision site. 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 Cardiovascular: No chest pain or pressure. No palpitations. Pulmonary: No shortness of breath. Gastrointestinal: No nausea, vomiting. Derm: See above                Physical Exam:     Visit Vitals  BP (!) 120/44   Pulse 87   Temp 98.6 °F (37 °C)   Resp 22   SpO2 100%     General: well developed, well nourished, pleasant , NAD. Hygiene good  Psych: cooperative. Pleasant. No anxiety or depression. Normal mood and affect. Neuro: alert and oriented to person/place/situation. Otherwise nonfocal.  Derm: Skin turgor normal for age, dry skin  HEENT: Normocephalic, atraumatic. EOMI. Conjunctiva clear. No scleral icterus. Chest: Good air entry bilaterally. Respirations non labored  Cardio[de-identified] Normal heart sounds,no rubs, murmurs or gallops  Abdomen: Soft, nontender, nondistended, normoactive bowel sounds  Lower extremities: color normal; temperature normal. Calves are supple, nontender.  Capillary refill <3 sec      Wound Description:   Wound Length:      Wound Width :     Wound Depth :     Wound Hip Left   Date First Assessed/Time First Assessed: 05/26/21 1440   Present on Hospital Admission: Yes  Primary Wound Type: Open incision/surgical site  Location: Hip  Wound Location Orientation: Left   Wound Image    Wound Etiology Non-Healing Surgical   Dressing Status Other (Comment)  (pt states it fell off, pt presents with wound uncovered )   Cleansed Cleansed with saline   Dressing/Treatment Other (Comment)  (santyl)   Dressing Change Due 06/24/21   Wound Length (cm) 1 cm   Wound Width (cm) 2 cm   Wound Depth (cm) 1 cm   Wound Surface Area (cm^2) 2 cm^2   Change in Wound Size % 55.56   Wound Volume (cm^3) 2 cm^3   Wound Healing % -100   Distance Tunneling (cm) 2.5 cm   Direction of Tunnel 2 o'clock   Wound Assessment Slough   Drainage Amount Moderate   Drainage Description Serous   Wound Odor None   Camryn-Wound/Incision Assessment Blanchable erythema; Warm   Edges Undefined edges   Wound Thickness Description Full thickness          Data Review:   No results found for this or any previous visit (from the past 24 hour(s)). Assessment:     Patient Active Problem List   Diagnosis Code    Hypertension I10    COPD (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 ([facility]) J44.9    Chronic renal disease, stage 3, moderately decreased glomerular filtration rate between 30-59 mL/min/1.73 square meter ([facility]) N18.30    Acute exacerbation of chronic obstructive pulmonary disease (COPD) ([address] J44.1    Debility R53.81    Chronic respiratory failure with hypoxia ([facility]) J96.11    Tobacco dependence F17.200    Left hip pain M25.552    PAF (paroxysmal atrial fibrillation) ([facility]) I48.0    Avascular necrosis of bone of left hip ([facility]) M87.052    Acute respiratory failure with hypoxia and hypercapnia ([facility]) J96.01, J96.02    Obesity E66.9    Acute pulmonary edema ([facility]) J81.0    Anemia D64.9    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 02XA    Scrotal edema N50.89    Open wound of left hip S71.002A     66 y.o. male with open wound of left hip. Recent left hip surgery   Concern for infection, prescribed course of doxycycline.   Needs :  Serial debridement- debrided today- see note below  Good local wound care  Edema management  Nutrition optimization    Plan:     In wound care clinic today:   Cleanse wound with NS or soap and water or commercial wound cleanser   Apply the following topically to wound bed:santyl   Apply the following dressings: bordered dressing     For Home Care/Self Care   Frequency:daily   Cleanse wound with NS or soap and water or commercial wound cleanser   Keep dressing dry and intact when bathing   Apply the following to wound bed:santyl   Apply the following dressings: cover with dry dressing     Leave dressings in place until next visit     Patient to return for wound care in: 7 Days     PLEASE CONTACT OFFICE AS SOON AS POSSIBLE IF UNABLE TO MAKE THIS APPOINTMENT. Inspect your wounds, looking for signs of infection which may include the following:  Increase in redness  Red \"streaks\" from wound  Increase in swelling  Fever  Unusual odor  Change in the amount of wound drainage. Should you experience any significant changes in your wound(s) or have any questions regarding your home care instructions please contact the wound center or your home health company. If after regular business hours, please call your family doctor or local emergency room. Edema Control: Elevate legs as much as possible. Avoid standing in one position for more than 10 minutes. Avoid setting with legs down. Do not cross legs while sitting. Off-Loading:Frequent position changes. Do not cross legs while sitting. Shift weight every 20 minutes or more when sitting for prolonged periods of time.     Signed By: Ino Galeana MD     July 10, 2021

## 2021-07-10 NOTE — WOUND CARE
[address] [facility]  Follow up note. Chief Complaint:  Mikayla Mcconnell is a 66 y.o.  male who presents for follow up of left hip open wound along incision site. 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 Cardiovascular: No chest pain or pressure. No palpitations. Pulmonary: No shortness of breath. Gastrointestinal: No nausea, vomiting. Derm: See above                Physical Exam:     Visit Vitals  BP (!) 133/55 (BP 1 Location: Left upper arm, BP Patient Position: At rest;Sitting)   Pulse 80   Temp 98.1 °F (36.7 °C)   Resp 22   SpO2 97%     General: well developed, well nourished, pleasant , NAD. Hygiene good  Psych: cooperative. Pleasant. No anxiety or depression. Normal mood and affect. Neuro: alert and oriented to person/place/situation. Otherwise nonfocal.  Derm: Skin turgor normal for age, dry skin  HEENT: Normocephalic, atraumatic. EOMI. Conjunctiva clear. No scleral icterus. Chest: Good air entry bilaterally. Respirations non labored  Cardio[de-identified] Normal heart sounds,no rubs, murmurs or gallops  Abdomen: Soft, nontender, nondistended, normoactive bowel sounds  Lower extremities: color normal; temperature normal. Calves are supple, nontender.  Capillary refill <3 sec      Wound Description:   Wound Length:      Wound Width :     Wound Depth :   Wound Hip Left   Date First Assessed/Time First Assessed: 05/26/21 1440   Present on Hospital Admission: Yes  Primary Wound Type: Open incision/surgical site  Location: Hip  Wound Location Orientation: Left   Wound Image                Wound Etiology Non-Healing Surgical   Dressing Status Breakthrough drainage noted   Cleansed Wound cleanser   Dressing/Treatment Foam;Silicone border;Collagen;Alginate with Ag   Dressing Change Due 06/16/21   Wound Length (cm) 1 cm   Wound Width (cm) 1.4 cm   Wound Depth (cm) 0.4 cm   Wound Surface Area (cm^2) 1.4 cm^2 Change in Wound Size % 68.89   Wound Volume (cm^3) 0.56 cm^3   Wound Healing % 44   Post-Procedure Length (cm) 1.5 cm   Post-Procedure Width (cm) 1.6 cm   Post-Procedure Depth (cm) 0.5 cm   Post-Procedure Surface Area (cm^2) 2.4 cm^2   Post-Procedure Volume (cm^3) 1.2 cm^3   Wound Assessment Slough   Drainage Amount Small   Drainage Description Serosanguinous   Wound Odor None   Camryn-Wound/Incision Assessment Blanchable erythema   Edges Undefined edges   Wound Thickness Description Full thickness           Data Review:   No results found for this or any previous visit (from the past 24 hour(s)). Assessment:     Patient Active Problem List   Diagnosis Code    Hypertension I10    COPD (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 ([facility]) J44.9    Chronic renal disease, stage 3, moderately decreased glomerular filtration rate between 30-59 mL/min/1.73 square meter ([facility]) N18.30    [facility] of [facility] disease [facility]) [facility] J44.1    Debility R53.81    Chronic respiratory failure with hypoxia ([facility]) J96.11    Tobacco dependence F17.200    Left hip pain M25.552    PAF (paroxysmal atrial fibrillation) ([facility]) I48.0    Avascular necrosis of bone of left hip ([facility]) M87.052    Acute respiratory failure with hypoxia and hypercapnia ([facility]) J96.01, J96.02    Obesity E66.9    Acute pulmonary edema ([facility]) J81.0    Anemia D64.9    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 02XA    Scrotal edema N50.89    Open wound of left hip S71.002A     66 y.o. male with open wound of left hip. Recent left hip surgery     Needs :  Serial debridement- debrided today- see note below  Good local wound care  Edema management  Nutrition optimization    Plan:   Informed consent obtained. Patient/family member explained about the need of the procedure.   Consent in chart  Debridement:   Excisional debridement of non pressure ulcer of left hip  Indication: to remove necrotic tissue/ devitalized tissue/ soft eschar/ infected tissue through subcutaneous tissue epidermis and dermis layer of wound bed  Anesthesia: Topical 2% lidocaine jelly   Instrument: Curette, Blade,     Residual Necrosis: Present and scored  Bleeding: <1ml   Hemostasis: Pressure   Patient tolerated procedure well   Procedural Pain: mild  Post - procedural pain: mild    Post debridement measurements: 1.5 x 1.6 cm  Surface area debrided: 2.4 sq. cm      In wound care clinic today: 06/16/2021   Cleanse wound with NS or soap and water or commercial wound cleanser   Apply the following topically to wound bed: 2% lidocaine topical, bacitracin   Apply the following to rachid-wound:   Apply the following dressings: promagran,silver alginate,silocone border,tape     For Home Care/Self Care   Keep dressing dry and intact when bathing     Leave dressings in place until next visit     Patient to return for wound care in: 7 Days     PLEASE CONTACT OFFICE AS SOON AS POSSIBLE IF UNABLE TO MAKE THIS APPOINTMENT. Inspect your wounds, looking for signs of infection which may include the following:  Increase in redness  Red \"streaks\" from wound  Increase in swelling  Fever  Unusual odor  Change in the amount of wound drainage. Should you experience any significant changes in your wound(s) or have any questions regarding your home care instructions please contact the wound center or your home health company. If after regular business hours, please call your family doctor or local emergency room. Edema Control: Elevate legs as much as possible. Avoid standing in one position for more than 10 minutes. Avoid setting with legs down. Do not cross legs while sitting. Off-Loading:Frequent position changes. Do not cross legs while sitting. Shift weight every 20 minutes or more when sitting for prolonged periods of time.     Signed By: Ruba Briceno MD     July 9, 2021

## 2021-07-12 ENCOUNTER — HOSPITAL ENCOUNTER (INPATIENT)
Age: 76
LOS: 3 days | Discharge: NURSING, REHAB OR LONG-TERM CARE | DRG: 543 | End: 2021-07-15
Attending: EMERGENCY MEDICINE | Admitting: FAMILY MEDICINE
Payer: MEDICARE

## 2021-07-12 ENCOUNTER — APPOINTMENT (OUTPATIENT)
Dept: GENERAL RADIOLOGY | Age: 76
DRG: 543 | End: 2021-07-12
Attending: PHYSICIAN ASSISTANT
Payer: MEDICARE

## 2021-07-12 DIAGNOSIS — S72.413A INTERCONDYLAR FRACTURE OF FEMUR (HCC): Primary | ICD-10-CM

## 2021-07-12 DIAGNOSIS — D64.9 ANEMIA, UNSPECIFIED TYPE: ICD-10-CM

## 2021-07-12 PROBLEM — M79.605 LEFT LEG PAIN: Status: ACTIVE | Noted: 2021-07-12

## 2021-07-12 LAB
ALBUMIN SERPL-MCNC: 2.7 G/DL (ref 3.4–5)
ALBUMIN/GLOB SERPL: 0.7 {RATIO} (ref 0.8–1.7)
ALP SERPL-CCNC: 139 U/L (ref 45–117)
ALT SERPL-CCNC: 13 U/L (ref 16–61)
ANION GAP SERPL CALC-SCNC: 8 MMOL/L (ref 3–18)
AST SERPL-CCNC: 12 U/L (ref 10–38)
BASOPHILS # BLD: 0.1 K/UL (ref 0–0.1)
BASOPHILS NFR BLD: 1 % (ref 0–2)
BILIRUB SERPL-MCNC: 0.2 MG/DL (ref 0.2–1)
BNP SERPL-MCNC: 503 PG/ML (ref 0–1800)
BUN SERPL-MCNC: 25 MG/DL (ref 7–18)
BUN/CREAT SERPL: 18 (ref 12–20)
CALCIUM SERPL-MCNC: 8 MG/DL (ref 8.5–10.1)
CHLORIDE SERPL-SCNC: 102 MMOL/L (ref 100–111)
CK MB CFR SERPL CALC: 3 % (ref 0–4)
CK MB SERPL-MCNC: 2.7 NG/ML (ref 5–25)
CK SERPL-CCNC: 90 U/L (ref 39–308)
CO2 SERPL-SCNC: 28 MMOL/L (ref 21–32)
CREAT SERPL-MCNC: 1.38 MG/DL (ref 0.6–1.3)
DIFFERENTIAL METHOD BLD: ABNORMAL
EOSINOPHIL # BLD: 1.1 K/UL (ref 0–0.4)
EOSINOPHIL NFR BLD: 11 % (ref 0–5)
ERYTHROCYTE [DISTWIDTH] IN BLOOD BY AUTOMATED COUNT: 14.4 % (ref 11.6–14.5)
GLOBULIN SER CALC-MCNC: 3.8 G/DL (ref 2–4)
GLUCOSE SERPL-MCNC: 114 MG/DL (ref 74–99)
HCT VFR BLD AUTO: 27.2 % (ref 36–48)
HGB BLD-MCNC: 8.7 G/DL (ref 13–16)
LYMPHOCYTES # BLD: 0.8 K/UL (ref 0.9–3.6)
LYMPHOCYTES NFR BLD: 9 % (ref 21–52)
MCH RBC QN AUTO: 28.2 PG (ref 24–34)
MCHC RBC AUTO-ENTMCNC: 32 G/DL (ref 31–37)
MCV RBC AUTO: 88 FL (ref 74–97)
MONOCYTES # BLD: 0.7 K/UL (ref 0.05–1.2)
MONOCYTES NFR BLD: 8 % (ref 3–10)
NEUTS SEG # BLD: 7 K/UL (ref 1.8–8)
NEUTS SEG NFR BLD: 71 % (ref 40–73)
PLATELET # BLD AUTO: 511 K/UL (ref 135–420)
PMV BLD AUTO: 8.7 FL (ref 9.2–11.8)
POTASSIUM SERPL-SCNC: 3.5 MMOL/L (ref 3.5–5.5)
PROT SERPL-MCNC: 6.5 G/DL (ref 6.4–8.2)
RBC # BLD AUTO: 3.09 M/UL (ref 4.35–5.65)
SODIUM SERPL-SCNC: 138 MMOL/L (ref 136–145)
TROPONIN I SERPL-MCNC: <0.02 NG/ML (ref 0–0.04)
WBC # BLD AUTO: 9.8 K/UL (ref 4.6–13.2)

## 2021-07-12 PROCEDURE — 65270000029 HC RM PRIVATE

## 2021-07-12 PROCEDURE — 99285 EMERGENCY DEPT VISIT HI MDM: CPT

## 2021-07-12 PROCEDURE — 73552 X-RAY EXAM OF FEMUR 2/>: CPT

## 2021-07-12 PROCEDURE — 74011000250 HC RX REV CODE- 250: Performed by: FAMILY MEDICINE

## 2021-07-12 PROCEDURE — 96374 THER/PROPH/DIAG INJ IV PUSH: CPT

## 2021-07-12 PROCEDURE — 74011250636 HC RX REV CODE- 250/636: Performed by: FAMILY MEDICINE

## 2021-07-12 PROCEDURE — 74011000250 HC RX REV CODE- 250: Performed by: PHYSICIAN ASSISTANT

## 2021-07-12 PROCEDURE — 82550 ASSAY OF CK (CPK): CPT

## 2021-07-12 PROCEDURE — 94640 AIRWAY INHALATION TREATMENT: CPT

## 2021-07-12 PROCEDURE — 74011250637 HC RX REV CODE- 250/637: Performed by: PHYSICIAN ASSISTANT

## 2021-07-12 PROCEDURE — 85025 COMPLETE CBC W/AUTO DIFF WBC: CPT

## 2021-07-12 PROCEDURE — 71045 X-RAY EXAM CHEST 1 VIEW: CPT

## 2021-07-12 PROCEDURE — 80053 COMPREHEN METABOLIC PANEL: CPT

## 2021-07-12 PROCEDURE — 83880 ASSAY OF NATRIURETIC PEPTIDE: CPT

## 2021-07-12 PROCEDURE — 77030013140 HC MSK NEB VYRM -A

## 2021-07-12 PROCEDURE — 74011250637 HC RX REV CODE- 250/637: Performed by: FAMILY MEDICINE

## 2021-07-12 PROCEDURE — 74011250636 HC RX REV CODE- 250/636: Performed by: PHYSICIAN ASSISTANT

## 2021-07-12 RX ORDER — SODIUM CHLORIDE 0.9 % (FLUSH) 0.9 %
5-40 SYRINGE (ML) INJECTION AS NEEDED
Status: DISCONTINUED | OUTPATIENT
Start: 2021-07-12 | End: 2021-07-15 | Stop reason: HOSPADM

## 2021-07-12 RX ORDER — ACETAMINOPHEN 325 MG/1
650 TABLET ORAL
Status: DISCONTINUED | OUTPATIENT
Start: 2021-07-12 | End: 2021-07-15 | Stop reason: HOSPADM

## 2021-07-12 RX ORDER — MORPHINE SULFATE 2 MG/ML
2 INJECTION, SOLUTION INTRAMUSCULAR; INTRAVENOUS
Status: DISCONTINUED | OUTPATIENT
Start: 2021-07-12 | End: 2021-07-15 | Stop reason: HOSPADM

## 2021-07-12 RX ORDER — POLYETHYLENE GLYCOL 3350 17 G/17G
17 POWDER, FOR SOLUTION ORAL DAILY PRN
Status: DISCONTINUED | OUTPATIENT
Start: 2021-07-12 | End: 2021-07-15 | Stop reason: HOSPADM

## 2021-07-12 RX ORDER — ONDANSETRON 4 MG/1
4 TABLET, ORALLY DISINTEGRATING ORAL
Status: DISCONTINUED | OUTPATIENT
Start: 2021-07-12 | End: 2021-07-15 | Stop reason: HOSPADM

## 2021-07-12 RX ORDER — CARBOXYMETHYLCELLULOSE SODIUM 5 MG/ML
2 SOLUTION/ DROPS OPHTHALMIC AS NEEDED
Status: DISCONTINUED | OUTPATIENT
Start: 2021-07-12 | End: 2021-07-15 | Stop reason: HOSPADM

## 2021-07-12 RX ORDER — SODIUM CHLORIDE 0.9 % (FLUSH) 0.9 %
5-40 SYRINGE (ML) INJECTION EVERY 8 HOURS
Status: DISCONTINUED | OUTPATIENT
Start: 2021-07-12 | End: 2021-07-15 | Stop reason: HOSPADM

## 2021-07-12 RX ORDER — IPRATROPIUM BROMIDE AND ALBUTEROL SULFATE 2.5; .5 MG/3ML; MG/3ML
3 SOLUTION RESPIRATORY (INHALATION)
Status: COMPLETED | OUTPATIENT
Start: 2021-07-12 | End: 2021-07-12

## 2021-07-12 RX ORDER — LANOLIN ALCOHOL/MO/W.PET/CERES
325 CREAM (GRAM) TOPICAL 2 TIMES DAILY WITH MEALS
Status: DISCONTINUED | OUTPATIENT
Start: 2021-07-13 | End: 2021-07-15 | Stop reason: HOSPADM

## 2021-07-12 RX ORDER — TAMSULOSIN HYDROCHLORIDE 0.4 MG/1
0.4 CAPSULE ORAL EVERY EVENING
Status: DISCONTINUED | OUTPATIENT
Start: 2021-07-12 | End: 2021-07-15 | Stop reason: HOSPADM

## 2021-07-12 RX ORDER — DIPHENHYDRAMINE HCL 25 MG
25 CAPSULE ORAL
Status: DISCONTINUED | OUTPATIENT
Start: 2021-07-12 | End: 2021-07-15 | Stop reason: HOSPADM

## 2021-07-12 RX ORDER — AMLODIPINE BESYLATE 5 MG/1
5 TABLET ORAL DAILY
Status: DISCONTINUED | OUTPATIENT
Start: 2021-07-13 | End: 2021-07-12

## 2021-07-12 RX ORDER — ACETAMINOPHEN 500 MG
1000 TABLET ORAL
Status: COMPLETED | OUTPATIENT
Start: 2021-07-12 | End: 2021-07-12

## 2021-07-12 RX ORDER — HEPARIN SODIUM 5000 [USP'U]/ML
5000 INJECTION, SOLUTION INTRAVENOUS; SUBCUTANEOUS EVERY 8 HOURS
Status: DISCONTINUED | OUTPATIENT
Start: 2021-07-12 | End: 2021-07-15 | Stop reason: HOSPADM

## 2021-07-12 RX ORDER — BUDESONIDE 0.5 MG/2ML
500 INHALANT ORAL 2 TIMES DAILY
Status: DISCONTINUED | OUTPATIENT
Start: 2021-07-12 | End: 2021-07-13

## 2021-07-12 RX ORDER — OXYCODONE HYDROCHLORIDE 5 MG/1
5 TABLET ORAL
Status: DISCONTINUED | OUTPATIENT
Start: 2021-07-12 | End: 2021-07-15 | Stop reason: HOSPADM

## 2021-07-12 RX ORDER — IPRATROPIUM BROMIDE AND ALBUTEROL SULFATE 2.5; .5 MG/3ML; MG/3ML
3 SOLUTION RESPIRATORY (INHALATION)
Status: DISCONTINUED | OUTPATIENT
Start: 2021-07-12 | End: 2021-07-15 | Stop reason: HOSPADM

## 2021-07-12 RX ORDER — FUROSEMIDE 10 MG/ML
40 INJECTION INTRAMUSCULAR; INTRAVENOUS ONCE
Status: COMPLETED | OUTPATIENT
Start: 2021-07-12 | End: 2021-07-12

## 2021-07-12 RX ORDER — ACETAMINOPHEN 650 MG/1
650 SUPPOSITORY RECTAL
Status: DISCONTINUED | OUTPATIENT
Start: 2021-07-12 | End: 2021-07-15 | Stop reason: HOSPADM

## 2021-07-12 RX ORDER — FUROSEMIDE 20 MG/1
20 TABLET ORAL DAILY
Status: DISCONTINUED | OUTPATIENT
Start: 2021-07-13 | End: 2021-07-15 | Stop reason: HOSPADM

## 2021-07-12 RX ORDER — FAMOTIDINE 20 MG/1
20 TABLET, FILM COATED ORAL DAILY
Status: DISCONTINUED | OUTPATIENT
Start: 2021-07-13 | End: 2021-07-15 | Stop reason: HOSPADM

## 2021-07-12 RX ORDER — DILTIAZEM HYDROCHLORIDE 30 MG/1
30 TABLET, FILM COATED ORAL
Status: DISCONTINUED | OUTPATIENT
Start: 2021-07-13 | End: 2021-07-15 | Stop reason: HOSPADM

## 2021-07-12 RX ORDER — ARFORMOTEROL TARTRATE 15 UG/2ML
15 SOLUTION RESPIRATORY (INHALATION) 2 TIMES DAILY
Status: DISCONTINUED | OUTPATIENT
Start: 2021-07-12 | End: 2021-07-13

## 2021-07-12 RX ORDER — ONDANSETRON 2 MG/ML
4 INJECTION INTRAMUSCULAR; INTRAVENOUS
Status: DISCONTINUED | OUTPATIENT
Start: 2021-07-12 | End: 2021-07-15 | Stop reason: HOSPADM

## 2021-07-12 RX ADMIN — FUROSEMIDE 40 MG: 10 INJECTION, SOLUTION INTRAMUSCULAR; INTRAVENOUS at 17:55

## 2021-07-12 RX ADMIN — BUDESONIDE 500 MCG: 0.5 INHALANT RESPIRATORY (INHALATION) at 21:22

## 2021-07-12 RX ADMIN — ACETAMINOPHEN 1000 MG: 500 TABLET ORAL at 16:54

## 2021-07-12 RX ADMIN — IPRATROPIUM BROMIDE AND ALBUTEROL SULFATE 3 ML: .5; 3 SOLUTION RESPIRATORY (INHALATION) at 16:55

## 2021-07-12 RX ADMIN — MORPHINE SULFATE 2 MG: 2 INJECTION, SOLUTION INTRAMUSCULAR; INTRAVENOUS at 21:17

## 2021-07-12 RX ADMIN — HEPARIN SODIUM 5000 UNITS: 5000 INJECTION INTRAVENOUS; SUBCUTANEOUS at 21:16

## 2021-07-12 RX ADMIN — ONDANSETRON 4 MG: 2 INJECTION INTRAMUSCULAR; INTRAVENOUS at 21:17

## 2021-07-12 RX ADMIN — Medication 10 ML: at 23:31

## 2021-07-12 RX ADMIN — ARFORMOTEROL TARTRATE 15 MCG: 15 SOLUTION RESPIRATORY (INHALATION) at 21:22

## 2021-07-12 RX ADMIN — TAMSULOSIN HYDROCHLORIDE 0.4 MG: 0.4 CAPSULE ORAL at 23:31
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
## 2021-07-13 LAB
ANION GAP SERPL CALC-SCNC: 9 MMOL/L (ref 3–18)
BUN SERPL-MCNC: 23 MG/DL (ref 7–18)
BUN/CREAT SERPL: 19 (ref 12–20)
CALCIUM SERPL-MCNC: 7.3 MG/DL (ref 8.5–10.1)
CHLORIDE SERPL-SCNC: 101 MMOL/L (ref 100–111)
CO2 SERPL-SCNC: 28 MMOL/L (ref 21–32)
CREAT SERPL-MCNC: 1.2 MG/DL (ref 0.6–1.3)
ERYTHROCYTE [DISTWIDTH] IN BLOOD BY AUTOMATED COUNT: 14.2 % (ref 11.6–14.5)
GLUCOSE SERPL-MCNC: 100 MG/DL (ref 74–99)
HCT VFR BLD AUTO: 23.9 % (ref 36–48)
HGB BLD-MCNC: 7.4 G/DL (ref 13–16)
MCH RBC QN AUTO: 27.2 PG (ref 24–34)
MCHC RBC AUTO-ENTMCNC: 31 G/DL (ref 31–37)
MCV RBC AUTO: 87.9 FL (ref 74–97)
PLATELET # BLD AUTO: 378 K/UL (ref 135–420)
PMV BLD AUTO: 9 FL (ref 9.2–11.8)
POTASSIUM SERPL-SCNC: 3.3 MMOL/L (ref 3.5–5.5)
RBC # BLD AUTO: 2.72 M/UL (ref 4.35–5.65)
SODIUM SERPL-SCNC: 138 MMOL/L (ref 136–145)
WBC # BLD AUTO: 9.2 K/UL (ref 4.6–13.2)

## 2021-07-13 PROCEDURE — 74011250637 HC RX REV CODE- 250/637: Performed by: HOSPITALIST

## 2021-07-13 PROCEDURE — 65270000029 HC RM PRIVATE

## 2021-07-13 PROCEDURE — 94640 AIRWAY INHALATION TREATMENT: CPT

## 2021-07-13 PROCEDURE — 74011000250 HC RX REV CODE- 250: Performed by: FAMILY MEDICINE

## 2021-07-13 PROCEDURE — 74011250636 HC RX REV CODE- 250/636: Performed by: FAMILY MEDICINE

## 2021-07-13 PROCEDURE — 77010033678 HC OXYGEN DAILY

## 2021-07-13 PROCEDURE — 94760 N-INVAS EAR/PLS OXIMETRY 1: CPT

## 2021-07-13 PROCEDURE — 36415 COLL VENOUS BLD VENIPUNCTURE: CPT

## 2021-07-13 PROCEDURE — 80048 BASIC METABOLIC PNL TOTAL CA: CPT

## 2021-07-13 PROCEDURE — 85027 COMPLETE CBC AUTOMATED: CPT

## 2021-07-13 PROCEDURE — 74011250637 HC RX REV CODE- 250/637: Performed by: FAMILY MEDICINE

## 2021-07-13 PROCEDURE — 76450000000

## 2021-07-13 RX ORDER — BUDESONIDE 0.5 MG/2ML
500 INHALANT ORAL
Status: DISCONTINUED | OUTPATIENT
Start: 2021-07-13 | End: 2021-07-15 | Stop reason: HOSPADM

## 2021-07-13 RX ORDER — ARFORMOTEROL TARTRATE 15 UG/2ML
15 SOLUTION RESPIRATORY (INHALATION)
Status: DISCONTINUED | OUTPATIENT
Start: 2021-07-13 | End: 2021-07-15 | Stop reason: HOSPADM

## 2021-07-13 RX ORDER — POTASSIUM CHLORIDE 20 MEQ/1
40 TABLET, EXTENDED RELEASE ORAL
Status: COMPLETED | OUTPATIENT
Start: 2021-07-13 | End: 2021-07-13

## 2021-07-13 RX ADMIN — Medication 10 ML: at 14:00

## 2021-07-13 RX ADMIN — FAMOTIDINE 20 MG: 20 TABLET ORAL at 09:29

## 2021-07-13 RX ADMIN — Medication 10 ML: at 07:02

## 2021-07-13 RX ADMIN — DILTIAZEM HYDROCHLORIDE 30 MG: 30 TABLET, FILM COATED ORAL at 11:29

## 2021-07-13 RX ADMIN — COLLAGENASE SANTYL: 250 OINTMENT TOPICAL at 11:31

## 2021-07-13 RX ADMIN — ARFORMOTEROL TARTRATE 15 MCG: 15 SOLUTION RESPIRATORY (INHALATION) at 07:09

## 2021-07-13 RX ADMIN — DILTIAZEM HYDROCHLORIDE 30 MG: 30 TABLET, FILM COATED ORAL at 07:02

## 2021-07-13 RX ADMIN — FERROUS SULFATE TAB 325 MG (65 MG ELEMENTAL FE) 325 MG: 325 (65 FE) TAB at 09:29

## 2021-07-13 RX ADMIN — FUROSEMIDE 20 MG: 20 TABLET ORAL at 09:29

## 2021-07-13 RX ADMIN — TAMSULOSIN HYDROCHLORIDE 0.4 MG: 0.4 CAPSULE ORAL at 17:57

## 2021-07-13 RX ADMIN — HEPARIN SODIUM 5000 UNITS: 5000 INJECTION INTRAVENOUS; SUBCUTANEOUS at 11:30

## 2021-07-13 RX ADMIN — Medication 10 ML: at 21:49

## 2021-07-13 RX ADMIN — IPRATROPIUM BROMIDE AND ALBUTEROL SULFATE 3 ML: .5; 3 SOLUTION RESPIRATORY (INHALATION) at 18:09

## 2021-07-13 RX ADMIN — MORPHINE SULFATE 2 MG: 2 INJECTION, SOLUTION INTRAMUSCULAR; INTRAVENOUS at 11:30

## 2021-07-13 RX ADMIN — IPRATROPIUM BROMIDE AND ALBUTEROL SULFATE 3 ML: .5; 3 SOLUTION RESPIRATORY (INHALATION) at 04:20

## 2021-07-13 RX ADMIN — MORPHINE SULFATE 2 MG: 2 INJECTION, SOLUTION INTRAMUSCULAR; INTRAVENOUS at 21:47

## 2021-07-13 RX ADMIN — POTASSIUM CHLORIDE 40 MEQ: 20 TABLET, EXTENDED RELEASE ORAL at 11:29

## 2021-07-13 RX ADMIN — ONDANSETRON 4 MG: 2 INJECTION INTRAMUSCULAR; INTRAVENOUS at 21:47

## 2021-07-13 RX ADMIN — HEPARIN SODIUM 5000 UNITS: 5000 INJECTION INTRAVENOUS; SUBCUTANEOUS at 04:25

## 2021-07-13 RX ADMIN — HEPARIN SODIUM 5000 UNITS: 5000 INJECTION INTRAVENOUS; SUBCUTANEOUS at 21:46

## 2021-07-13 RX ADMIN — DILTIAZEM HYDROCHLORIDE 30 MG: 30 TABLET, FILM COATED ORAL at 17:57

## 2021-07-13 RX ADMIN — FERROUS SULFATE TAB 325 MG (65 MG ELEMENTAL FE) 325 MG: 325 (65 FE) TAB at 17:57

## 2021-07-13 RX ADMIN — MORPHINE SULFATE 2 MG: 2 INJECTION, SOLUTION INTRAMUSCULAR; INTRAVENOUS at 07:02

## 2021-07-13 RX ADMIN — BUDESONIDE 500 MCG: 0.5 INHALANT RESPIRATORY (INHALATION) at 07:09

## 2021-07-13 NOTE — CONSULTS
History and Physical        Patient: Zana Erazo               Sex: male          DOA: 7/12/2021         YOB: 1945      Age:  66 y.o.        LOS:  LOS: 1 day        HPI:     Zana Erazo is a 66 y.o. male who complains of left hip pain. He has suffered a fall at home. He typically ambulates with a walker for balance. He has multiple comorbidities. He presented to the ER where they confirmed a femur fracture. He complains of pain in the anterior thigh left 8/10. He was unable to ambulated after the fall. He had a left hip replacement this past April. He has not had a previous fracture to this leg. He denies any numbness or tingling. Past Medical History:   Diagnosis Date    Brain aneurysm     Cancer [facility])     prostate    COPD (chronic obstructive pulmonary disease) (HCC)     Hypertension     Nocturia     On home oxygen therapy     Pneumonia     Prostate neoplasm     Radiation effect        Past Surgical History:   Procedure Laterality Date    HX HERNIA REPAIR      HX HIP ARTHROSCOPY  04/09/2021       Family History   Problem Relation Age of Onset    Heart Disease Mother        Social History     Socioeconomic History    Marital status:      Spouse name: Not on file    Number of children: Not on file    Years of education: Not on file    Highest education level: Not on file   Tobacco Use    Smoking status: Former Smoker     Types: Cigarettes    Smokeless tobacco: Never Used   Substance and Sexual Activity    Alcohol use: No    Drug use: Never     Social Determinants of Health     Financial Resource Strain:     Difficulty of Paying Living Expenses:    Food Insecurity:     Worried About Running Out of Food in the Last Year:     Ran Out of Food in the Last Year:    Transportation Needs:     Lack of Transportation (Medical):      Lack of Transportation (Non-Medical):    Physical Activity:     Days of Exercise per Week:     Minutes of Exercise per Session:    Stress:     Feeling of Stress :    Social Connections:     Frequency of Communication with Friends and Family:     Frequency of Social Gatherings with Friends and Family:     Attends Jew Services:     Active Member of Clubs or Organizations:     Attends Club or Organization Meetings:     Marital Status:        Prior to Admission medications    Medication Sig Start Date End Date Taking? Authorizing Provider   collagenase (SANTYL) 250 unit/gram ointment Apply  to affected area daily. 6/23/21  Yes Abdelrahman Jc MD   arformoteroL (BROVANA) 15 mcg/2 mL nebu neb solution 2 mL by Nebulization route two (2) times a day. 4/14/21  Yes Sepideh Goss MD   budesonide (PULMICORT) 0.5 mg/2 mL nbsp 2 mL by Nebulization route two (2) times a day. 4/14/21  Yes Sepideh Goss MD   enoxaparin (LOVENOX) 40 mg/0.4 mL 0.4 mL by SubCUTAneous route daily. 4/14/21  Yes Sepideh Goss MD   famotidine (PEPCID) 20 mg tablet Take 1 Tab by mouth daily. 4/14/21  Yes Sepideh Goss MD   ferrous sulfate 325 mg (65 mg iron) tablet Take 1 Tab by mouth two (2) times daily (with meals). 4/14/21  Yes Sepideh Goss MD   amLODIPine (NORVASC) 5 mg tablet Take 1 Tab by mouth daily. 3/3/21  Yes Gennaro Zapata MD   albuterol-ipratropium (DUO-NEB) 2.5 mg-0.5 mg/3 ml nebu 3 mL by Nebulization route every four (4) hours as needed for Wheezing. 2/7/21  Yes Aniya Jordan MD   albuterol (PROVENTIL HFA, VENTOLIN HFA, PROAIR HFA) 90 mcg/actuation inhaler Take 2 Puffs by inhalation every four (4) hours as needed for Wheezing or Shortness of Breath. 2/7/21  Yes Aniya Jordan MD   OXYGEN-AIR DELIVERY SYSTEMS 2 L/min by Nasal route continuous. Yes Provider, Historical   furosemide (Lasix) 20 mg tablet Take 20 mg by mouth daily. Yes Provider, Historical   dilTIAZem (CARDIZEM) 30 mg tablet Take 1 Tab by mouth Before breakfast, lunch, and dinner. Patient taking differently: Take 30 mg by mouth daily.  Daily 4/14/20  Yes Sepideh Goss MD acetaminophen (TYLENOL) 325 mg tablet Take 650 mg by mouth every eight (8) hours as needed for Pain. Yes Provider, Historical   dextran 70/hypromellose (ARTIFICIAL TEARS, PF, OP) Apply 2 Drops to eye as needed for Other (both eyes for dryness). Yes Provider, Historical   diphenhydrAMINE (BENADRYL) 25 mg capsule Take 25 mg by mouth nightly as needed for Itching. Yes Provider, Historical   tamsulosin (FLOMAX) 0.4 mg capsule Take 0.4 mg by mouth every evening. Yes Other, MD Blayne       Allergies   Allergen Reactions    Aspirin Other (comments)     \"messes my stomach up\"    Bactrim [Sulfamethoxazole-Trimethoprim] Unknown (comments)       Review of Systems  A comprehensive review of systems was negative except for that written in the History of Present Illness. Physical Exam:      Visit Vitals  BP (!) 129/54 (BP 1 Location: Left upper arm, BP Patient Position: At rest)   Pulse 89   Temp 98 °F (36.7 °C)   Resp 19   Ht 5' 8\" (1.727 m)   Wt 90.7 kg (200 lb)   SpO2 99%   BMI 30.41 kg/m²       Physical Exam:  General: Alert and Oriented X 3  Lungs: Clear to ausculation bilaterally  Cardiovascular: Regular Rate and Rhythm, without murmur  Abdomen: Soft, nontender with positive bowel sounds in all four quadrants  Gential/Rectal: deferred  Musculoskeletal: The paitent has full range of motion of the lumbar spine in flexion, extension and side bending bilaterally. The patient has pain with flexion. There is pain with internal and external rotation of the left hip. The patient is tender across the left anterior thigh. The patient is neurologically intact in the lower extremities. There is a Negative straight leg raise. His pulses are 2+. Calves are nontender. Radiographic evaluation shows left intercondylar femur fracture nondisplaced  Labs Reviewed: All lab results for the last 24 hours reviewed.     Assessment/Plan     Principal Problem:    Intercondylar fracture of femur ([address] (7/12/2021)    Active Problems:    Hypertension ()      COPD (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 ([facility] [address] (4/20/2018)      Chronic renal disease, stage 3, moderately decreased glomerular filtration rate between 30-59 mL/min/1.73 square meter (HCC) (7/20/2018)      Anemia (4/11/2021)      Left leg pain (7/12/2021)         At this time we discussed conservative versus interventional treatment. Patient was informed that we could place pins across the fracture and he could toe touch weight bear versus placing patient in a mandel full leg fx brace and he would be nonweight bearing for approx 6 weeks. He would like to move forward with conservative treatment at this time and order brace. He understands the importance of being completely nonweight bearing and may need surgery if fracture shifts. Will discuss with patient again tomorrow.

## 2021-07-13 NOTE — PROGRESS NOTES
0734-Bedside and Verbal shift change report given to Melissa King (oncoming nurse) by Eliezer Barreto (offgoing nurse). Report included the following information SBAR, Kardex, Intake/Output, and MAR. Patient A/OX4 Patient in bed resting, no complaints at this time. 1130-Patient requested something for pain, administered Morphine 2 MG IV Pain 7/10. Bedside and Verbal shift change report given to SHWETA Vega RN (oncoming nurse) by Melissa King (offgoing nurse). Report included the following information SBAR, Kardex, Intake/Output, and MAR.

## 2021-07-13 NOTE — PROGRESS NOTES
7/13/2021 PT note: consult received and chart reviewed. Note pt admitted with L distal femoral fracture and awaiting ortho consult. Will await ortho consult and f/up accordingly thereafter. Please advise regarding wt bearing L LE.    Thank you for this referral.   Guanaco Delacruz, PT

## 2021-07-13 NOTE — PROGRESS NOTES
Problem: Pressure Injury - Risk of  Goal: *Prevention of pressure injury  Description: Document Nikos Scale and appropriate interventions in the flowsheet.   Outcome: Progressing Towards Goal  Note: Pressure Injury Interventions:  Sensory Interventions: Keep linens dry and wrinkle-free, Minimize linen layers         Activity Interventions: Pressure redistribution bed/mattress(bed type)    Mobility Interventions: Pressure redistribution bed/mattress (bed type)    Nutrition Interventions: Document food/fluid/supplement intake

## 2021-07-13 NOTE — ACP (ADVANCE CARE PLANNING)
Advance Care Planning     General Advance Care Planning (ACP)     Date of record review: 7/12/2021  Not able to have conversation with patient. He is very hard of hearing and was in pain and could not concentrate on the discussion. 1210: message left on spouse's voice mail for telephone call back to have an in depth conversation about goals of care. Healthcare Decision Maker:     Primary Decision Maker: Marzena Alfaro - Spouse - [phone]    Secondary Decision Maker: Donna Alcazar - Daughter - [phone]    Today we documented Decision Maker(s) consistent with ACP documents on file. Content/Action Overview: Has ACP document(s) on file - reflects the patient's care preferences as far as the medical record states. This needs to be confirmed by the patient. Reviewed DNR/DNI and medical record (POST form dated 8/7/2021) confirms current DNR status. Again, need to review with patient and spouse for confirmation. 7/13/2021 1150 Seen today in room 303 along with Pieter Su NP  . Awake, alert. Oriented x 4. Extremely hard of hearing even with hearing aid in. Respirations unlabored but he does grunt with breathing. Oxygen on at 3 lpm per NC (Uses O2 at 2 lpm per NC at home) . Able to speak in 3-4 words but this seems to be more pain related. States having significant back itching. Attempted to reposition pt in bed and he had significant left leg pain. He is able to use his right leg well. Pain--had received morphine ~ 45 mins before our assessment. He states he fell while getting out of a chair at home and broke his leg. He was seen by ortho today who offered surgical vs conservative treatment and he chose the conservative regimen. Lives in a single family home with his wife, Kwabena Diamond. A message was left for her at 1210 so that we could further discuss his hospitalization and confirm his previous choices for end of life care.  Awaiting call back    CODE STATUS:  FULL CODE his POST form choices can be verified. Disposition plan: anticipate there is may be a strong need for inpatient rehabilitation. Palliative care will continue to follow Jenni Perez during his hospitalization and support him and his family as they make healthcare decisions and establish goals of care.       Johanna Gutierrez RN, MSN  Palliative Medicine  P: [phone]
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
## 2021-07-13 NOTE — PROGRESS NOTES
Hospitalist Progress Note    Patient: Nii Bear MRN: [id number]  CSN: [id number]    YOB: 1945  Age: 66 y.o. Sex: male    DOA: 7/12/2021 LOS:  LOS: 1 day                Assessment/Plan     Patient Active Problem List   Diagnosis Code    Hypertension I10    COPD (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 ([facility]) J44.9    Chronic renal disease, stage 3, moderately decreased glomerular filtration rate between 30-59 mL/min/1.73 square meter ([facility]) N18.30    Acute exacerbation of chronic obstructive pulmonary disease ([facility]D) [facility] J44.1    Debility R53.81    Chronic respiratory failure with hypoxia ([facility]) J96.11    Tobacco dependence F17.200    Left hip pain M25.552    PAF (paroxysmal atrial fibrillation) ([facility]) I48.0    Avascular necrosis of bone of left hip ([facility]) M87.052    Acute respiratory failure with hypoxia and hypercapnia ([facility]) J96.01, J96.02    Obesity E66.9    Acute pulmonary edema ([facility]) J81.0    Anemia D64.9    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 02XA    Scrotal edema N50.89    Open wound of left hip S71.002A    Intercondylar fracture of femur ([facility]) S72.413A    Left leg pain M79.605            Nii Bear is a 66 y.o. male with PMHX hypertension, COPD, CRD, prostate cancer, brain aneurysm, on home O2  due to chronic respiratory failure and medical noncompliance.  Admitted for fracture of left leg.        Intercondylar fracture of left distal femur -  Orthopedic surgery consulted   Further management per orthopedics     Controlled hypertension -  Resume home medication     Chronic COPD with chronic respiratory failure and oxygen supplementation-  Resume home medication  DuoNebs as needed  Oxygen supplementation     Chronic renal disease stage III -  Avoid nephrotoxins  Creatinine at baseline  Normal electrolytes     Paroxysmal atrial fibrillation -  No acute issues  Resume home medication     Anemia -  Chronic, monitor H/H    Open wound left hip -  Continue with santyl.     DVT prophylaxis with heparin and GI prophylaxis with pepcid. Disposition : TBD    Review of systems  General: No fevers or chills. Cardiovascular: No chest pain or pressure. No palpitations. Pulmonary: No shortness of breath. Gastrointestinal: No nausea, vomiting. Physical Exam:  General: Awake, cooperative, no acute distress    HEENT: NC, Atraumatic. PERRLA, anicteric sclerae. Lungs: CTA Bilaterally. No Wheezing/Rhonchi/Rales. Heart:  S1 S2,  No murmur, No Rubs, No Gallops  Abdomen: Soft, Non distended, Non tender.  +Bowel sounds,   Extremities: No c/c/e  Psych:   Not anxious or agitated. Neurologic:  No acute neurological deficit. Vital signs/Intake and Output:  Visit Vitals  BP (!) 127/49   Pulse 83   Temp 98.5 °F (36.9 °C)   Resp 20   Ht 5' 8\" (1.727 m)   Wt 90.7 kg (200 lb)   SpO2 99%   BMI 30.41 kg/m²     Current Shift:  07/13 0701 - 07/13 1900  In: 450 [P.O.:450]  Out: 875 [Urine:875]  Last three shifts:  07/11 1901 - 07/13 0700  In: 120 [P.O.:120]  Out: 1800 [Urine:1800]            Labs: Results:       Chemistry Recent Labs     07/13/21  0515 07/12/21  1748   * 114*    138   K 3.3* 3.5    102   CO2 28 28   BUN 23* 25*   CREA 1.20 1.38*   CA 7.3* 8.0*   AGAP 9 8   BUCR 19 18   AP  --  139*   TP  --  6.5   ALB  --  2.7*   GLOB  --  3.8   AGRAT  --  0.7*      CBC w/Diff Recent Labs     07/13/21  0515 07/12/21  1748   WBC 9.2 9.8   RBC 2.72* 3.09*   HGB 7.4* 8.7*   HCT 23.9* 27.2*    511*   GRANS  --  71   LYMPH  --  9*   EOS  --  11*      Cardiac Enzymes Recent Labs     07/12/21  1748   CPK 90   CKND1 3.0      Coagulation No results for input(s): PTP, INR, APTT, INREXT in the last 72 hours.     Lipid Panel No results found for: CHOL, CHOLPOCT, CHOLX, CHLST, CHOLV, 346941, HDL, HDLP, LDL, LDLC, DLDLP, 013954, VLDLC, VLDL, TGLX, TRIGL, TRIGP, TGLPOCT, CHHD, CHHDX BNP No results for input(s): BNPP in the last 72 hours.    Liver Enzymes Recent Labs     07/12/21  1748   TP 6.5   ALB 2.7*   *      Thyroid Studies Lab Results   Component Value Date/Time    TSH 3.06 04/05/2021 01:30 PM        Procedures/imaging: see electronic medical records for all procedures/Xrays and details which were not copied into this note but were reviewed prior to creation of Plan

## 2021-07-13 NOTE — PROGRESS NOTES
TRANSFER - IN REPORT:    Verbal report received from CAITLIN Wilkinson on [facility] [facility]  being received from ER(unit) for routine progression of care fracture  R femur. Patient has no pain medication on med list, ER nurse obtaining orders for PRN pain medication. Report consisted of patients Situation, Background, Assessment and   Recommendations(SBAR). Information from the following report(s) SBAR, Kardex, ED Summary, MAR, Accordion, Recent Results, Med Rec Status, Cardiac Rhythm NSR and Quality Measures was reviewed with the receiving nurse. Opportunity for questions and clarification was provided.       Awaiting arrival

## 2021-07-13 NOTE — PROGRESS NOTES
OT Consult received. Chart reviewed. Pt with vertical intercondylar fracture at L femur. Ortho consult note in chart, recommends mandel full leg fx brace with NWB at L LE. Will follow after the brace is obtained for this pt. Thank you.    Oli Quach OTR/GLENN

## 2021-07-13 NOTE — ED NOTES
TRANSFER - OUT REPORT:    Verbal report given to CAITLIN Brock(name) on Jelly Ponce  being transferred to Medical(unit) for routine progression of care       Report consisted of patients Situation, Background, Assessment and   Recommendations(SBAR). Information from the following report(s) SBAR, ED Summary and MAR was reviewed with the receiving nurse. Lines:   Peripheral IV 07/12/21 Right Antecubital (Active)        Opportunity for questions and clarification was provided.       Patient transported with:   [employer]

## 2021-07-14 ENCOUNTER — APPOINTMENT (OUTPATIENT)
Dept: WOUND CARE | Age: 76
End: 2021-07-14
Attending: HOSPITALIST

## 2021-07-14 LAB
ANION GAP SERPL CALC-SCNC: 7 MMOL/L (ref 3–18)
BUN SERPL-MCNC: 21 MG/DL (ref 7–18)
BUN/CREAT SERPL: 19 (ref 12–20)
CALCIUM SERPL-MCNC: 7.5 MG/DL (ref 8.5–10.1)
CHLORIDE SERPL-SCNC: 103 MMOL/L (ref 100–111)
CO2 SERPL-SCNC: 30 MMOL/L (ref 21–32)
CREAT SERPL-MCNC: 1.1 MG/DL (ref 0.6–1.3)
ERYTHROCYTE [DISTWIDTH] IN BLOOD BY AUTOMATED COUNT: 14.2 % (ref 11.6–14.5)
GLUCOSE SERPL-MCNC: 97 MG/DL (ref 74–99)
HCT VFR BLD AUTO: 24.2 % (ref 36–48)
HGB BLD-MCNC: 7.7 G/DL (ref 13–16)
MCH RBC QN AUTO: 28.3 PG (ref 24–34)
MCHC RBC AUTO-ENTMCNC: 31.8 G/DL (ref 31–37)
MCV RBC AUTO: 89 FL (ref 74–97)
PLATELET # BLD AUTO: 349 K/UL (ref 135–420)
PMV BLD AUTO: 9.1 FL (ref 9.2–11.8)
POTASSIUM SERPL-SCNC: 3.7 MMOL/L (ref 3.5–5.5)
RBC # BLD AUTO: 2.72 M/UL (ref 4.35–5.65)
SODIUM SERPL-SCNC: 140 MMOL/L (ref 136–145)
WBC # BLD AUTO: 9.2 K/UL (ref 4.6–13.2)

## 2021-07-14 PROCEDURE — 36415 COLL VENOUS BLD VENIPUNCTURE: CPT

## 2021-07-14 PROCEDURE — 74011250636 HC RX REV CODE- 250/636: Performed by: FAMILY MEDICINE

## 2021-07-14 PROCEDURE — 65270000029 HC RM PRIVATE

## 2021-07-14 PROCEDURE — 74011250637 HC RX REV CODE- 250/637: Performed by: FAMILY MEDICINE

## 2021-07-14 PROCEDURE — 94640 AIRWAY INHALATION TREATMENT: CPT

## 2021-07-14 PROCEDURE — 80048 BASIC METABOLIC PNL TOTAL CA: CPT

## 2021-07-14 PROCEDURE — 85027 COMPLETE CBC AUTOMATED: CPT

## 2021-07-14 PROCEDURE — 74011000250 HC RX REV CODE- 250: Performed by: FAMILY MEDICINE

## 2021-07-14 PROCEDURE — 99221 1ST HOSP IP/OBS SF/LOW 40: CPT | Performed by: NURSE PRACTITIONER

## 2021-07-14 RX ADMIN — TAMSULOSIN HYDROCHLORIDE 0.4 MG: 0.4 CAPSULE ORAL at 16:26

## 2021-07-14 RX ADMIN — IPRATROPIUM BROMIDE AND ALBUTEROL SULFATE 3 ML: .5; 3 SOLUTION RESPIRATORY (INHALATION) at 07:23

## 2021-07-14 RX ADMIN — FERROUS SULFATE TAB 325 MG (65 MG ELEMENTAL FE) 325 MG: 325 (65 FE) TAB at 16:27

## 2021-07-14 RX ADMIN — DILTIAZEM HYDROCHLORIDE 30 MG: 30 TABLET, FILM COATED ORAL at 16:27

## 2021-07-14 RX ADMIN — HEPARIN SODIUM 5000 UNITS: 5000 INJECTION INTRAVENOUS; SUBCUTANEOUS at 11:56

## 2021-07-14 RX ADMIN — BUDESONIDE 500 MCG: 0.5 INHALANT RESPIRATORY (INHALATION) at 20:42

## 2021-07-14 RX ADMIN — IPRATROPIUM BROMIDE AND ALBUTEROL SULFATE 3 ML: .5; 3 SOLUTION RESPIRATORY (INHALATION) at 12:06

## 2021-07-14 RX ADMIN — OXYCODONE 5 MG: 5 TABLET ORAL at 08:29

## 2021-07-14 RX ADMIN — DILTIAZEM HYDROCHLORIDE 30 MG: 30 TABLET, FILM COATED ORAL at 08:24

## 2021-07-14 RX ADMIN — IPRATROPIUM BROMIDE AND ALBUTEROL SULFATE 3 ML: .5; 3 SOLUTION RESPIRATORY (INHALATION) at 02:33

## 2021-07-14 RX ADMIN — Medication 10 ML: at 21:18

## 2021-07-14 RX ADMIN — Medication 10 ML: at 16:27

## 2021-07-14 RX ADMIN — BUDESONIDE 500 MCG: 0.5 INHALANT RESPIRATORY (INHALATION) at 07:23

## 2021-07-14 RX ADMIN — COLLAGENASE SANTYL: 250 OINTMENT TOPICAL at 09:38

## 2021-07-14 RX ADMIN — DILTIAZEM HYDROCHLORIDE 30 MG: 30 TABLET, FILM COATED ORAL at 11:56

## 2021-07-14 RX ADMIN — HEPARIN SODIUM 5000 UNITS: 5000 INJECTION INTRAVENOUS; SUBCUTANEOUS at 05:49

## 2021-07-14 RX ADMIN — MORPHINE SULFATE 2 MG: 2 INJECTION, SOLUTION INTRAMUSCULAR; INTRAVENOUS at 05:53

## 2021-07-14 RX ADMIN — FAMOTIDINE 20 MG: 20 TABLET ORAL at 08:24

## 2021-07-14 RX ADMIN — ARFORMOTEROL TARTRATE 15 MCG: 15 SOLUTION RESPIRATORY (INHALATION) at 20:42

## 2021-07-14 RX ADMIN — FERROUS SULFATE TAB 325 MG (65 MG ELEMENTAL FE) 325 MG: 325 (65 FE) TAB at 08:24

## 2021-07-14 RX ADMIN — ARFORMOTEROL TARTRATE 15 MCG: 15 SOLUTION RESPIRATORY (INHALATION) at 07:23

## 2021-07-14 RX ADMIN — FUROSEMIDE 20 MG: 20 TABLET ORAL at 08:24

## 2021-07-14 RX ADMIN — HEPARIN SODIUM 5000 UNITS: 5000 INJECTION INTRAVENOUS; SUBCUTANEOUS at 21:18

## 2021-07-14 RX ADMIN — MORPHINE SULFATE 2 MG: 2 INJECTION, SOLUTION INTRAMUSCULAR; INTRAVENOUS at 12:05

## 2021-07-14 NOTE — PROGRESS NOTES
7/14/2021 PT note: Ortho consult note in chart, recommends mandel full leg fx brace with NWB at L LE. Spoke with nurse Eloisa Cortez. Pt fitted for brace yesterday and awaiting brace arrival.  Will follow after the brace is obtained for this pt.    Thank you for this referral.   Regan Branham, PT

## 2021-07-14 NOTE — PROGRESS NOTES
D/C Plan: SNF    CM attempted to contact pt's wife, Papa Jurado ([phone]), FTZ left a message requesting a return call. CM also attempted to contact pt's daughter, Alberto Castillo ([phone]), and left a message requesting a return call. CM to continue to follow and assist.    0935:  CM spoke with pt's  wife, Papa Jurado ([phone]), XIR discussed transition of care. Pt's wife has indicated she has been having difficulty with caring for pt at home and would like SNF placement. She would like to have [facility] as a first choice and is also agreeable to having clinical information sent to facilities in [address] to see what is available in the event [facility] is unable to accept this pt. CMS has been notified to assist.  Pt's wife is aware that pt may be in copay days as he was recently sent to [facility] in April and stayed about a month (per family). Mrs. Brent Gonzalez verbalized an understanding.   CM to continue to follow and assist.    Care Management Interventions  Transition of Care Consult (CM Consult): Discharge Planning  Health Maintenance Reviewed: Yes  Physical Therapy Consult: Yes  Occupational Therapy Consult: Yes  Current Support Network: Lives with Spouse  The Plan for Transition of Care is Related to the Following Treatment Goals : Home with [facility] vs SNF  The Patient and/or Patient Representative was Provided with a Choice of Provider and Agrees with the Discharge Plan?: Yes  Freedom of Choice List was Provided with Basic Dialogue that Supports the Patient's Individualized Plan of Care/Goals, Treatment Preferences and Shares the Quality Data Associated with the Providers?: Yes  Discharge Location  Discharge Placement: Home with home health (vs SNF)

## 2021-07-14 NOTE — PROGRESS NOTES
Progress Note      Patient: Marcin Salgado               Sex: male          DOA: 7/12/2021         YOB: 1945      Age:  66 y.o.        LOS:  LOS: 2 days              Subjective:     Marcin Salgado is a 66 y.o. male who c/o left hip/knee pain after a fall at home. He ambulates with walker, uses supplemental oxygen, and lives with wife at home. Patient has multiple comorbidities, extremely [place]. Imaging studies reveal distal femur fracture. He denies numbness/tingling in left lower extremity. Objective:      Visit Vitals  BP (!) 126/42 (BP 1 Location: Left upper arm, BP Patient Position: At rest)   Pulse 88   Temp 98.7 °F (37.1 °C)   Resp 18   Ht 5' 8\" (1.727 m)   Wt 95.3 kg (210 lb)   SpO2 98%   BMI 31.93 kg/m²       Physical Exam:  General: Alert and Oriented X 3  Lungs: Audible expiratory wheezing  Cardiovascular: Regular Rate and Rhythm, without murmur  Abdomen: Soft, nontender with positive bowel sounds in all four quadrants  Gential/Rectal: deferred  Musculoskeletal: The paitent has full range of motion of the lumbar spine in flexion, extension and side bending bilaterally. The patient has pain with flexion. There is pain with internal and external rotation of the left hip. The patient is tender across the left anterior thigh. The patient is neurologically intact in the lower extremities. There is a Negative straight leg raise. His pulses are 2+. Calves are nontender. Dressing: C/D/I    Intake and Output:  Current Shift:  No intake/output data recorded. Last three shifts:  07/12 1901 - 07/14 0700  In: 1392 [P.O.:1392]  Out: 3875 [Urine:3875]    Drain Output:    Lab/Data Reviewed: All lab results for the last 24 hours reviewed. Medications Reviewed    Continued hospitalization is indicated due to continued management of medical conditions, placement of mandel full leg fracture brace.        Assessment/Plan     Principal Problem:    Intercondylar fracture of femur ([address] (7/12/2021)    Active Problems:    Hypertension ()      COPD (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 ([facility] (4/20/2018)      Chronic renal disease, stage 3, moderately decreased glomerular filtration rate between 30-59 mL/min/1.73 square meter ([facility]) (7/20/2018)      Anemia (4/11/2021)      Left leg pain (7/12/2021)        We have discussed conservative versus surgical options for this patient. Patient has expressed he wants to move forward conservative care: with mandel fracture brace for left leg and placement into rehab facility. He states he has been to [address] [place] [address] in the past and has been happy with his care there. Will have case management discuss with patient and wife re: Rehab Placement. Patient will need to have Mandel brace applied prior to discharge to Rehab. He verbalizes understanding on non-weight bearing to Left Leg for the next 6 weeks approximately while in brace. Patient will f/u with us in office in 4-6 weeks. Discharge to Rehab- Case Management to discuss.

## 2021-07-14 NOTE — PROGRESS NOTES
OT consult note: Pt awaiting brace for L LE to be delivered. Will f/u when brace is obtained.      Zander Lutz, OTR/L

## 2021-07-14 NOTE — PROGRESS NOTES
Hospitalist Progress Note    Patient: Floyd Pantoja MRN: [id number]  [facility]: [id number]    YOB: 1945  Age: 66 y.o. Sex: male    DOA: 7/12/2021 LOS:  LOS: 2 days                Assessment/Plan     Patient Active Problem List   Diagnosis Code    Hypertension I10    COPD (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 ([facility]) J44.9    Chronic renal disease, stage 3, moderately decreased glomerular filtration rate between 30-59 mL/min/1.73 square meter ([facility]) N18.30    Acute exacerbation of chronic obstructive pulmonary disease (COPD) ([address] J44.1    Debility R53.81    Chronic respiratory failure with hypoxia ([facility]) J96.11    Tobacco dependence F17.200    Left hip pain M25.552    PAF (paroxysmal atrial fibrillation) ([facility]) I48.0    Avascular necrosis of bone of left hip ([facility]) M87.052    Acute respiratory failure with hypoxia and hypercapnia ([facility]) J96.01, J96.02    Obesity E66.9    Acute pulmonary edema ([facility]) J81.0    Anemia D64.9    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 02XA    Scrotal edema N50.89    Open wound of left hip S71.002A    Intercondylar fracture of femur ([facility]) S72.413A    Left leg pain M79.605            Floyd Pantoja is a 66 y.o. male with PMHX hypertension, COPD, CRD, prostate cancer, brain aneurysm, on home O2  due to chronic respiratory failure and medical noncompliance.  Admitted for fracture of left leg.        Intercondylar fracture of left distal femur -  Orthopedic surgery consulted   Conservative management.     Controlled hypertension -  Resume home medication     Chronic COPD with chronic respiratory failure and oxygen supplementation-  Resume home medication  DuoNebs as needed  Oxygen supplementation     Chronic renal disease stage III -  Avoid nephrotoxins  Creatinine at baseline  Normal electrolytes     Paroxysmal atrial fibrillation -  No acute issues  Resume home medication     Anemia -  Chronic, monitor H/H    Open wound left hip -  Continue with santyl.     DVT prophylaxis with heparin and GI prophylaxis with pepcid. Disposition : to [facility]    Review of systems  General: No fevers or chills. Cardiovascular: No chest pain or pressure. No palpitations. Pulmonary: No shortness of breath. Gastrointestinal: No nausea, vomiting. Physical Exam:  General: Awake, cooperative, no acute distress    HEENT: NC, Atraumatic. PERRLA, anicteric sclerae. Lungs: CTA Bilaterally. No Wheezing/Rhonchi/Rales. Heart:  S1 S2,  No murmur, No Rubs, No Gallops  Abdomen: Soft, Non distended, Non tender.  +Bowel sounds,   Extremities: No c/c/e  Psych:   Not anxious or agitated. Neurologic:  No acute neurological deficit. Vital signs/Intake and Output:  Visit Vitals  BP (!) 120/54   Pulse 78   Temp 97.7 °F (36.5 °C)   Resp 22   Ht 5' 8\" (1.727 m)   Wt 95.3 kg (210 lb)   SpO2 96%   BMI 31.93 kg/m²     Current Shift:  No intake/output data recorded. Last three shifts:  07/13 0701 - 07/14 1900  In: 1705 [P.O.:1494]  Out: 2450 [Urine:2450]            Labs: Results:       Chemistry Recent Labs     07/14/21 0545 07/13/21  0515 07/12/21  1748   GLU 97 100* 114*    138 138   K 3.7 3.3* 3.5    101 102   CO2 30 28 28   BUN 21* 23* 25*   CREA 1.10 1.20 1.38*   CA 7.5* 7.3* 8.0*   AGAP 7 9 8   BUCR 19 19 18   AP  --   --  139*   TP  --   --  6.5   ALB  --   --  2.7*   GLOB  --   --  3.8   AGRAT  --   --  0.7*      CBC w/Diff Recent Labs     07/14/21 0545 07/13/21  0515 07/12/21  1748   WBC 9.2 9.2 9.8   RBC 2.72* 2.72* 3.09*   HGB 7.7* 7.4* 8.7*   HCT 24.2* 23.9* 27.2*    378 511*   GRANS  --   --  71   LYMPH  --   --  9*   EOS  --   --  11*      Cardiac Enzymes Recent Labs     07/12/21  1748   CPK 90   CKND1 3.0      Coagulation No results for input(s): PTP, INR, APTT, INREXT, INREXT in the last 72 hours.     Lipid Panel No results found for: CHOL, CHOLPOCT, [facility], CHLST, CHOLV, [id number], HDL, HDLP, LDL, LDLC, DLDLP, 613198, VLDLC, VLDL, TGLX, TRIGL, TRIGP, TGLPOCT, CHHD, CHHDX   BNP No results for input(s): BNPP in the last 72 hours.    Liver Enzymes Recent Labs     07/12/21  1748   TP 6.5   ALB 2.7*   *      Thyroid Studies Lab Results   Component Value Date/Time    TSH 3.06 04/05/2021 01:30 PM        Procedures/imaging: see electronic medical records for all procedures/Xrays and details which were not copied into this note but were reviewed prior to creation of Plan

## 2021-07-14 NOTE — ACP (ADVANCE CARE PLANNING)
[facility] [phone]  [phone]      Palliative Care Support: This writer, along with Samir Smith NP, with the Palliative Care team; met with patient to offer support and to verified the accuracy of his current POST form. Patient was in bed and alert. He is extremely hard of hearing. He put his hearing aid in and was able to hear good enough to answer the team's questions. Patient resides with his wife. Patient's wife is his legal next of kin. Patient has a good support system, at home. Patient verbalized to being in pain, when he moves that leg that he broke. He verbalized understanding that he would be getting a leg brace, to help immobilized that injured leg. When asked about his previously completed POST form; patient stated that he wants everything to stay the same. Patient wants to be a DNR; however, he is okay with intubation pre-arrest and ventilation for two weeks. He also wants NO trach. After the two weeks, on the vent; patient want the artificial ventilation to be stopped. He current wishes has remained the same as when he originally completed the POST form. At this time, patient is a Partial Code: DNR but OK with intubation pre-arrest and ventilation for 2 weeks and NO trach. Recommendations: The Palliative Care team will continue to offer support to patient and his family; while he remains hospitalized. Tapan Horton, [facility]  Palliative Care   CHRISTIAN#[phone]

## 2021-07-14 NOTE — PROGRESS NOTES
Problem: Falls - Risk of  Goal: *Absence of Falls  Description: Document Rosanne Shukla Fall Risk and appropriate interventions in the flowsheet.   Outcome: Progressing Towards Goal  Note: Fall Risk Interventions:  Mobility Interventions: Bed/chair exit alarm         Medication Interventions: Patient to call before getting OOB

## 2021-07-14 NOTE — PROGRESS NOTES
Bedside and verbal shift change report given to [address] Concepcion (on coming nurse) by Vanetta Sicard, RN (off going nurse). Report included the following information SBAR, Kardex, OR Summary, Intake/Output and MAR.

## 2021-07-14 NOTE — PROGRESS NOTES
Bedside and verbal shift change report given to Asiya Sanders RN (on coming nurse) by Saba Muro RN (off going nurse). Report included the following information SBAR, Kardex, OR Summary, Intake/Output and MAR.

## 2021-07-14 NOTE — PALLIATIVE CARE
Full note to follow. Spoke with patient and reviewed POST form on file. Patient is OK with intubation pre-arrest and ventilation for 2 weeks - NO tracheostomy. Patient does not wish for chest compressions, shocks or ACLS medications in the event of cardiac arrest.     Partial code order placed. Attending and BSRN notified. Thank you for the opportunity to participate in the care of Mr. Marielena Rosa.     Smiley Xiong, [facility]-BC  Palliative Medicine

## 2021-07-15 VITALS
WEIGHT: 210 LBS | HEIGHT: 68 IN | SYSTOLIC BLOOD PRESSURE: 114 MMHG | OXYGEN SATURATION: 96 % | TEMPERATURE: 98.6 F | HEART RATE: 90 BPM | RESPIRATION RATE: 18 BRPM | DIASTOLIC BLOOD PRESSURE: 50 MMHG | BODY MASS INDEX: 31.83 KG/M2

## 2021-07-15 LAB
ANION GAP SERPL CALC-SCNC: 10 MMOL/L (ref 3–18)
BUN SERPL-MCNC: 22 MG/DL (ref 7–18)
BUN/CREAT SERPL: 17 (ref 12–20)
CALCIUM SERPL-MCNC: 7.5 MG/DL (ref 8.5–10.1)
CHLORIDE SERPL-SCNC: 101 MMOL/L (ref 100–111)
CO2 SERPL-SCNC: 28 MMOL/L (ref 21–32)
COVID-19 RAPID TEST, COVR: NOT DETECTED
CREAT SERPL-MCNC: 1.3 MG/DL (ref 0.6–1.3)
ERYTHROCYTE [DISTWIDTH] IN BLOOD BY AUTOMATED COUNT: 14 % (ref 11.6–14.5)
GLUCOSE SERPL-MCNC: 90 MG/DL (ref 74–99)
HCT VFR BLD AUTO: 23.8 % (ref 36–48)
HGB BLD-MCNC: 7.5 G/DL (ref 13–16)
MCH RBC QN AUTO: 27.7 PG (ref 24–34)
MCHC RBC AUTO-ENTMCNC: 31.5 G/DL (ref 31–37)
MCV RBC AUTO: 87.8 FL (ref 74–97)
PLATELET # BLD AUTO: 364 K/UL (ref 135–420)
PMV BLD AUTO: 9.2 FL (ref 9.2–11.8)
POTASSIUM SERPL-SCNC: 3.7 MMOL/L (ref 3.5–5.5)
RBC # BLD AUTO: 2.71 M/UL (ref 4.35–5.65)
SODIUM SERPL-SCNC: 139 MMOL/L (ref 136–145)
SOURCE, COVRS: NORMAL
WBC # BLD AUTO: 8.5 K/UL (ref 4.6–13.2)

## 2021-07-15 PROCEDURE — 97530 THERAPEUTIC ACTIVITIES: CPT

## 2021-07-15 PROCEDURE — 87635 SARS-COV-2 COVID-19 AMP PRB: CPT

## 2021-07-15 PROCEDURE — 85027 COMPLETE CBC AUTOMATED: CPT

## 2021-07-15 PROCEDURE — 97163 PT EVAL HIGH COMPLEX 45 MIN: CPT

## 2021-07-15 PROCEDURE — 80048 BASIC METABOLIC PNL TOTAL CA: CPT

## 2021-07-15 PROCEDURE — 36415 COLL VENOUS BLD VENIPUNCTURE: CPT

## 2021-07-15 PROCEDURE — 74011000250 HC RX REV CODE- 250: Performed by: FAMILY MEDICINE

## 2021-07-15 PROCEDURE — 74011250637 HC RX REV CODE- 250/637: Performed by: FAMILY MEDICINE

## 2021-07-15 PROCEDURE — 74011250636 HC RX REV CODE- 250/636: Performed by: FAMILY MEDICINE

## 2021-07-15 PROCEDURE — 94640 AIRWAY INHALATION TREATMENT: CPT

## 2021-07-15 RX ADMIN — HEPARIN SODIUM 5000 UNITS: 5000 INJECTION INTRAVENOUS; SUBCUTANEOUS at 12:46

## 2021-07-15 RX ADMIN — IPRATROPIUM BROMIDE AND ALBUTEROL SULFATE 3 ML: .5; 3 SOLUTION RESPIRATORY (INHALATION) at 03:02

## 2021-07-15 RX ADMIN — IPRATROPIUM BROMIDE AND ALBUTEROL SULFATE 3 ML: .5; 3 SOLUTION RESPIRATORY (INHALATION) at 07:43

## 2021-07-15 RX ADMIN — FERROUS SULFATE TAB 325 MG (65 MG ELEMENTAL FE) 325 MG: 325 (65 FE) TAB at 08:59

## 2021-07-15 RX ADMIN — FAMOTIDINE 20 MG: 20 TABLET ORAL at 08:59

## 2021-07-15 RX ADMIN — MORPHINE SULFATE 2 MG: 2 INJECTION, SOLUTION INTRAMUSCULAR; INTRAVENOUS at 11:07

## 2021-07-15 RX ADMIN — Medication 10 ML: at 14:00

## 2021-07-15 RX ADMIN — MORPHINE SULFATE 2 MG: 2 INJECTION, SOLUTION INTRAMUSCULAR; INTRAVENOUS at 14:31

## 2021-07-15 RX ADMIN — IPRATROPIUM BROMIDE AND ALBUTEROL SULFATE 3 ML: .5; 3 SOLUTION RESPIRATORY (INHALATION) at 11:53

## 2021-07-15 RX ADMIN — Medication 10 ML: at 06:40

## 2021-07-15 RX ADMIN — MORPHINE SULFATE 2 MG: 2 INJECTION, SOLUTION INTRAMUSCULAR; INTRAVENOUS at 03:28

## 2021-07-15 RX ADMIN — DILTIAZEM HYDROCHLORIDE 30 MG: 30 TABLET, FILM COATED ORAL at 06:40

## 2021-07-15 RX ADMIN — HEPARIN SODIUM 5000 UNITS: 5000 INJECTION INTRAVENOUS; SUBCUTANEOUS at 03:28

## 2021-07-15 RX ADMIN — DILTIAZEM HYDROCHLORIDE 30 MG: 30 TABLET, FILM COATED ORAL at 12:45

## 2021-07-15 RX ADMIN — FUROSEMIDE 20 MG: 20 TABLET ORAL at 08:59

## 2021-07-15 RX ADMIN — BUDESONIDE 500 MCG: 0.5 INHALANT RESPIRATORY (INHALATION) at 07:43

## 2021-07-15 RX ADMIN — COLLAGENASE SANTYL: 250 OINTMENT TOPICAL at 08:59

## 2021-07-15 RX ADMIN — OXYCODONE 5 MG: 5 TABLET ORAL at 04:10

## 2021-07-15 RX ADMIN — ARFORMOTEROL TARTRATE 15 MCG: 15 SOLUTION RESPIRATORY (INHALATION) at 07:43

## 2021-07-15 NOTE — PROGRESS NOTES
Problem: Pressure Injury - Risk of  Goal: *Prevention of pressure injury  Description: Document Nikos Scale and appropriate interventions in the flowsheet.   Outcome: Progressing Towards Goal  Note: Pressure Injury Interventions:  Sensory Interventions: Assess changes in LOC    Moisture Interventions: Minimize layers, Apply protective barrier, creams and emollients    Activity Interventions: Pressure redistribution bed/mattress(bed type)    Mobility Interventions: Pressure redistribution bed/mattress (bed type)    Nutrition Interventions: Document food/fluid/supplement intake    Friction and Shear Interventions: HOB 30 degrees or less          Problem: Patient Education: Go to Patient Education Activity  Goal: Patient/Family Education  Outcome: Progressing Towards Goal
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
## 2021-07-15 NOTE — PROGRESS NOTES
Problem: Mobility Impaired (Adult and Pediatric)  Goal: *Acute Goals and Plan of Care (Insert Text)  Description: Physical Therapy Goals   Initiated 7/15/2021 and to be accomplished within 7 day(s)  1. Patient will move from supine <> sit with mod/max assist in prep for out of bed activity and change of position. 2.  Patient will tolerate sitting EOB x 5-10 min for change of position. 3.  Patient will demonstrate participation in [address] ex program RLE to promote achievement of above goals. Outcome: Progressing Towards Goal   PHYSICAL THERAPY EVALUATION    Patient: Gail Nichole (89 y.o. male)  Date: 7/15/2021  Primary Diagnosis: Intercondylar fracture of femur ([facility] [S72.413A]  Anemia [D64.9]  Precautions:   Fall, NWB (LLE; L LE brace)  PLOF: amb with SPC , but has RW also, [place] [employer] hearing aide in at this time    ASSESSMENT :  Based on the objective data described below, the patient is seen on medical unit. Pt admitted for above dx and has received long leg brace in place with knee locked in ext currently and O2 in place. Pt pleasant and cooperative. Reports pain 10/10, therefore nurse Erma Coleet in to give pain meds at start of session. Pt presents with decreased ROM/motor performance R LE grossly 3-/5, L LE in brace as noted above and with min passive hip ab/add ~10deg. Pt with subsequent imitations in overall functional mobility. Pt able to roll L/R with mod/max assist while bed pad adjusted. Pt declined transition to sit EOB at this time. Left with all needs in reach and nurse Erma Banks made aware of 100 cc emptied from pt urinal.  Pt for discharge to SNF to continue rehab today. If not discharged will continue for goals as noted above. Patient will benefit from skilled intervention to address the above impairments.     Pt Education: Role of physical therapy in acute care setting, fall prevention and safety/technique during functional mobility tasks    Patient's rehabilitation potential is considered to be Fair  Factors which may influence rehabilitation potential include:   []         None noted  []         Mental ability/status  [x]         Medical condition  []         Home/family situation and support systems  []         Safety awareness  [x]         Pain tolerance/management  []         Other:      PLAN :  Recommendations and Planned Interventions:   []           Bed Mobility Training             []    Neuromuscular Re-Education  []           Transfer Training                   []    Orthotic/Prosthetic Training  []           Gait Training                          []    Modalities  [x]           Therapeutic Exercises           []    Edema Management/Control  [x]           Therapeutic Activities            []    Family Training/Education  [x]           Patient Education  []           Other (comment):    Frequency/Duration: Patient will be followed by physical therapy 1-2 times per day/4-7 days per week to address goals. Discharge Recommendations: Skilled Nursing Facility  Further Equipment Recommendations for Discharge: N/A     SUBJECTIVE:   Patient stated I don't like being restricted but I don't have a choice.     OBJECTIVE DATA SUMMARY:     Past Medical History:   Diagnosis Date    Brain aneurysm     Cancer ([address]     prostate    COPD (chronic obstructive pulmonary disease) ([facility]     Hypertension     Nocturia     On home oxygen therapy     Pneumonia     Prostate neoplasm     Radiation effect      Past Surgical History:   Procedure Laterality Date    HX HERNIA REPAIR      HX HIP ARTHROSCOPY  04/09/2021     Barriers to Learning/Limitations: yes;  sensory deficits-vision/hearing/speech and physical  Compensate with: Visual Cues, Verbal Cues and Tactile Cues  Home Situation:  Home Situation  Home Environment: Private residence  # Steps to Enter: 6  Rails to Enter: Yes  Hand Rails : Bilateral  One/Two Story Residence: One story  Living Alone: No  Support Systems: Spouse/Significant Other/Partner  Patient Expects to be Discharged to[de-identified] Rehabilitation facility  Critical Behavior:  Neurologic State: Alert; Appropriate for age  Orientation Level: Oriented X4  Cognition: Follows commands  Safety/Judgement: Awareness of environment  Psychosocial  Patient Behaviors: Calm; Cooperative   Purposeful Interaction: No (comment)  Pt Identified Daily Priority: Clinical issues (comment)  Caritas Process: Nurture loving kindness;Enable irais/hope;Establish trust  Caring Interventions: Reassure  Reassure: Therapeutic listening  Therapeutic Modalities: Humor; Intentional therapeutic touch  Skin Condition/Temp: Warm;Dry  Skin Integrity: Cracked; Incision (comment)  Skin Integumentary  Skin Color: Appropriate for ethnicity  Skin Condition/Temp: Warm;Dry  Skin Integrity: Cracked; Incision (comment)  Strength:    Strength: Generally decreased, functional (L LE knee not tested)  Tone & Sensation:   Sensation: Intact  Range Of Motion:  AROM: Generally decreased, functional (L LE knee not tested)  PROM: Generally decreased, functional (L LE  knee not tested)  Functional Mobility:  Bed Mobility:  Rolling: Moderate assistance;Maximum assistance  Pain:  Pain level pre-treatment: 10/10   Pain level post-treatment: 10/10   Pain Intervention(s) : Medication (see MAR); Rest, Ice, Repositioning  Response to intervention: Nurse notified, See doc flow    Activity Tolerance:   Fair   Please refer to the flowsheet for vital signs taken during this treatment. After treatment:   []         Patient left in no apparent distress sitting up in chair  [x]         Patient left in no apparent distress in bed  [x]         Call bell left within reach  [x]         Nursing notified  []         Caregiver present  []         Bed alarm activated  []         SCDs applied    COMMUNICATION/EDUCATION:   [x]         Role of Physical Therapy in the acute care setting.   [x]         Fall prevention education was provided and the patient/caregiver indicated understanding. [x]         Patient/family have participated as able in goal setting and plan of care. [x]         Patient/family agree to work toward stated goals and plan of care. []         Patient understands intent and goals of therapy, but is neutral about his/her participation. []         Patient is unable to participate in goal setting/plan of care: ongoing with therapy staff.  []         Other:     Thank you for this referral.  Jayne Hashimoto, PT   Time Calculation: 26 mins      Eval Complexity: History: HIGH Complexity :3+ comorbidities / personal factors will impact the outcome/ POC Exam:MEDIUM Complexity : 3 Standardized tests and measures addressing body structure, function, activity limitation and / or participation in recreation  Presentation: MEDIUM Complexity : Evolving with changing characteristics  Clinical Decision Making:Medium Complexity  Overall Complexity:MEDIUM

## 2021-07-15 NOTE — PROGRESS NOTES
Bedside and Verbal shift change report given to Ly (oncoming nurse) by BETTE Greenberg (offgoing nurse). Report included the following information SBAR, Kardex, Intake/Output, MAR and Recent Results. Shift Summary-   Patient Vitals for the past 12 hrs:   Temp Pulse Resp BP SpO2   07/15/21 0422 98.1 °F (36.7 °C) 93 19 (!) 122/45 98 %   07/14/21 2254 99.5 °F (37.5 °C) (!) 102 20 (!) 121/49 98 %   Pt given PRN morphine x1 overnight for pain to L leg. Pt given PRN vanessa x1 overnight for pain to L leg. Bedside and Verbal shift change report given to JASWANT Calle (oncoming nurse) by Ly (offgoing nurse). Report included the following information SBAR, Kardex, Intake/Output, MAR and Recent Results.

## 2021-07-15 NOTE — CONSULTS
[facility]: [phone]  [facility]: [phone]     Patient Name: Abraham Richey  YOB: 1945    Date of Initial Consult : 7/14/2021  Reason for Consult: Care decisions  Requesting Provider: Dr. Toy Mckenzie  Primary Care Physician: Debbie Wing MD      SUMMARY:   Abraham Richey is a 66 y.o. male with a past history of hypertension, COPD, CRD, prostate cancer, brain aneurysm and on home oxygen, who was admitted on 7/12/2021 from home with a diagnosis of fracture of left leg. Current medical issues leading to Palliative Medicine involvement include: Chronically ill-appearing 49-year-old gentleman with multiple comorbid conditions including COPD on home oxygen admitted with a left femur fracture. Palliative medicine is consulted for care decisions. .    CHIEF COMPLAINT: Fracture left leg    HPI/SUBJECTIVE:    Past history as above. Mr. Zahra Vela is a chronically ill-appearing 49-year-old gentleman who lives at home with his wife and is admitted with a left leg fracture after falling out of a chair at home. Reports being on home oxygen at 2 L for COPD. Endorses pain in left leg. Denies any increased shortness of breath from normal.    The patient is:   [x] Verbal and participatory  [] Non-participatory due to:     GOALS OF CARE: Partial code-NO chest compressions, shock or ACLS medications in the event of cardiac arrest.  OK with intubation pre-arrest and ventilation for 2 weeks-NO tracheostomy. Patient/Health Care Proxy Stated Goals: Prolong life      TREATMENT PREFERENCES:   Code Status: Partial Code         PALLIATIVE DIAGNOSES:   1. Encounter for palliative care/goals of care  2. Left femur fracture  3. COPD  4. Debility       PLAN:   1. Encounter for palliative care/goals of care -seen at bedside along with Mr. Sen Humphries MSW. Mr. Zahra Vela is lying in bed, awake alert and oriented x4. Nasal oxygen at 3 L in place.   He is very hard of hearing and uses a hearing aid. Once he put his hearing aid and he was able to engage in conversation with our team and answer our questions. Patient lives at home with his wife and reports having a good support system at home. Reports having pain in his left leg, worse with any movement. Patient shared with us that he is getting a full leg brace to immobilize his broken leg. Patient is known to the palliative medicine team from previous hospitalizations and a physician order for scope of treatment (POST) form is on file. Reviewed POST form with patient who shared that there have been no changes and his wishes remain the same which are: No CPR, chest compressions, shock or ACLS medications in the event of cardiac arrest.  Patient is OK with intubation and mechanical ventilation in the event of respiratory decline for a 2-week trial-NO tracheostomy. No feeding tube. Partial code order placed to reflect these wishes. Attending and bedside RN notified. 2. Left femur fracture -primary team managing, Ortho consulted. Patient has elected conservative treatment with full leg brace and nonweightbearing x6 weeks    3. COPD - primary team managing, nasal oxygen at 3 L, nebulizers    4. Debility -PPS 40 which indicates an overall poor prognosis, lives at home with his wife. On home oxygen at 2 L. PT/OT consult    5. Initial consult note routed to primary continuity provider    6. Communicated plan of care with: Palliative IDT, patient      Advance Care Planning:  [] [facility] Interdisciplinary Team has updated the ACP Navigator with [address] and Patient Capacity    Primary Decision [facility] ([address]):   Primary Decision MakerRorestes Solares - [phone]    Secondary Decision Maker:  Ange Ramy - Daughter - [phone]    Medical Interventions: Full interventions   Artificially Administered Nutrition: No feeding tube     As far as possible, the palliative care team has discussed with patient / health care proxy about goals of care / treatment preferences for patient. HISTORY:     History obtained from: Chart and patient    Principal Problem:    Intercondylar fracture of femur ([facility] [address] (7/12/2021)    Active Problems:    Hypertension ()      COPD (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 (HCC) (4/20/2018)      Chronic renal disease, stage 3, moderately decreased glomerular filtration rate between 30-59 mL/min/1.73 square meter (HCC) (7/20/2018)      Anemia (4/11/2021)      Left leg pain (7/12/2021)      Past Medical History:   Diagnosis Date    Brain aneurysm     Cancer [facility])     prostate    COPD (chronic obstructive pulmonary disease) ([facility] [address]     Hypertension     Nocturia     On home oxygen therapy     Pneumonia     Prostate neoplasm     Radiation effect       Past Surgical History:   Procedure Laterality Date    HX HERNIA REPAIR      HX HIP ARTHROSCOPY  04/09/2021      Family History   Problem Relation Age of Onset    Heart Disease Mother      History reviewed, no pertinent family history.   Social History     Tobacco Use    Smoking status: Former Smoker     Types: Cigarettes    Smokeless tobacco: Never Used   Substance Use Topics    Alcohol use: No     Allergies   Allergen Reactions    Aspirin Other (comments)     \"messes my stomach up\"    Bactrim [Sulfamethoxazole-Trimethoprim] Unknown (comments)      Current Facility-Administered Medications   Medication Dose Route Frequency    arformoteroL (BROVANA) neb solution 15 mcg  15 mcg Nebulization BID RT    budesonide (PULMICORT) 500 mcg/2 ml nebulizer suspension  500 mcg Nebulization BID RT    sodium chloride (NS) flush 5-40 mL  5-40 mL IntraVENous Q8H    sodium chloride (NS) flush 5-40 mL  5-40 mL IntraVENous PRN    acetaminophen (TYLENOL) tablet 650 mg  650 mg Oral Q6H PRN    Or    acetaminophen (TYLENOL) suppository 650 mg  650 mg Rectal Q6H PRN    polyethylene glycol (MIRALAX) packet 17 g  17 g Oral DAILY PRN    ondansetron (ZOFRAN ODT) tablet 4 mg  4 mg Oral Q8H PRN    Or    ondansetron (ZOFRAN) injection 4 mg  4 mg IntraVENous Q6H PRN    famotidine (PEPCID) tablet 20 mg  20 mg Oral DAILY    heparin (porcine) injection 5,000 Units  5,000 Units SubCUTAneous Q8H    oxyCODONE IR (ROXICODONE) tablet 5 mg  5 mg Oral Q4H PRN    morphine injection 2 mg  2 mg IntraVENous Q3H PRN    albuterol-ipratropium (DUO-NEB) 2.5 MG-0.5 MG/3 ML  3 mL Nebulization Q4H PRN    collagenase (SANTYL) 250 unit/gram ointment   Topical DAILY    dilTIAZem IR (CARDIZEM) tablet 30 mg  30 mg Oral TIDAC    diphenhydrAMINE (BENADRYL) capsule 25 mg  25 mg Oral QHS PRN    ferrous sulfate tablet 325 mg  325 mg Oral BID WITH MEALS    furosemide (LASIX) tablet 20 mg  20 mg Oral DAILY    tamsulosin (FLOMAX) capsule 0.4 mg  0.4 mg Oral QPM    carboxymethylcellulose sodium (REFRESH PLUS) 0.5 % ophthalmic solution 2 Drop  2 Drop Both Eyes PRN          Clinical Pain Assessment (nonverbal scale for nonverbal patients): Clinical Pain Assessment  Severity: 4          Duration: for how long has pt been experiencing pain (e.g., 2 days, 1 month, years)  Frequency: how often pain is an issue (e.g., several times per day, once every few days, constant)     FUNCTIONAL ASSESSMENT:     Palliative Performance Scale (PPS):  PPS: 40    ECOG  ECOG Status : Limited self-care     PSYCHOSOCIAL/SPIRITUAL SCREENING:      Any spiritual / Judaism concerns:  [] Yes /  [x] No    Caregiver Burnout:  [] Yes /  [] No /  [x] No Caregiver Present      Anticipatory grief assessment:   [x] Normal  / [] Maladaptive        REVIEW OF SYSTEMS:     Systems: constitutional, ears/nose/mouth/throat, respiratory, gastrointestinal, genitourinary, musculoskeletal, integumentary, neurologic, psychiatric, endocrine. Positive findings noted below.   Modified ESAS Completed by: provider           Pain: 4           Dyspnea: 1           Stool Occurrence(s): 0 Positive and pertinent negative findings in ROS are noted above in HPI. The following systems were [x] reviewed / [] unable to be reviewed as noted in HPI  Other findings are noted below. PHYSICAL EXAM:     Constitutional: Chronically ill-appearing lying in bed awake alert and oriented x4  Eyes: pupils equal, anicteric  ENMT: no nasal discharge, dry mucous membranes, very hard of hearing  Cardiovascular: regular rhythm  Respiratory: Audible wheezing at times, nasal oxygen at 3 L  Gastrointestinal: soft non-tender  Last bowel movement: None recorded  Musculoskeletal:  + pain left thigh  Skin: warm, dry  Neurologic: following commands, moving all extremities  Psychiatric: full affect, no hallucinations    Other: Wt Readings from Last 3 Encounters:   07/14/21 95.3 kg (210 lb)   04/22/21 106.6 kg (235 lb 1.6 oz)   04/09/21 98.6 kg (217 lb 4.8 oz)     Blood pressure (!) 105/40, pulse 87, temperature 98.5 °F (36.9 °C), resp. rate 18, height 5' 8\" (1.727 m), weight 95.3 kg (210 lb), SpO2 92 %. Pain:  Pain Scale 1: Numeric (0 - 10)  Pain Intensity 1: 0     Pain Location 1: Leg  Pain Orientation 1: Left  Pain Description 1: Sharp, Shooting  Pain Intervention(s) 1: Medication (see MAR)       LAB AND IMAGING FINDINGS:     Lab Results   Component Value Date/Time    WBC 8.5 07/15/2021 06:25 AM    HGB 7.5 (L) 07/15/2021 06:25 AM    PLATELET 135 99/95/9348 06:25 AM     Lab Results   Component Value Date/Time    Sodium 140 07/14/2021 05:45 AM    Potassium 3.7 07/14/2021 05:45 AM    Chloride 103 07/14/2021 05:45 AM    CO2 30 07/14/2021 05:45 AM    BUN 21 (H) 07/14/2021 05:45 AM    Creatinine 1.10 07/14/2021 05:45 AM    Calcium 7.5 (L) 07/14/2021 05:45 AM    Magnesium 2.1 05/18/2021 08:30 AM    Phosphorus 2.7 04/14/2021 03:00 AM      Lab Results   Component Value Date/Time    Alk.  phosphatase 139 (H) 07/12/2021 05:48 PM    Protein, total 6.5 07/12/2021 05:48 PM    Albumin 2.7 (L) 07/12/2021 05:48 PM    Globulin 3.8 07/12/2021 05:48 PM     Lab Results   Component Value Date/Time    INR 1.1 04/18/2021 03:17 PM    Prothrombin time 13.7 04/18/2021 03:17 PM    aPTT 32.4 04/18/2021 03:17 PM      No results found for: IRON, FE, TIBC, IBCT, PSAT, FERR   No results found for: PH, PCO2, PO2  No components found for: [place]   Lab Results   Component Value Date/Time    CK 90 07/12/2021 05:48 PM    CK - MB 2.7 07/12/2021 05:48 PM              Total time: 30 minutes     > 50% counseling / coordination:  Time spent in direct consultation with the patient, medical team, and family     Prolonged service was provided for  []30 min   []75 min in face to face time in the presence of the patient, spent as noted above. Time Start:   Time End:     Disclaimer: Sections of this note are dictated using utilizing voice recognition software, which may have resulted in some phonetic based errors in grammar and contents. Even though attempts were made to correct all the mistakes, some may have been missed, and remained in the body of the document. If questions arise, please contact our department.

## 2021-07-15 NOTE — PROGRESS NOTES
D/C Plan: [facility]    Pt has been accepted to [address] [place] [address] for admission today. Provider has indicated pt is medically stable to transition to [address] [place] [address] today. CM spoke with pt's  wife, Kendall Zamora ([phone]), DMU notified her of the above. Pt's wife is in agreement with plan transfer to [address] [place] [address] today. Pt had a brace ordered and it will have to be sent with pt to assist with transition of care. Transition of Care Plan:     Communication to Patient/Family:  Met with patient and family, and they are agreeable to the transition plan. The Plan for Transition of Care is related to the following treatment goals: SNF    The Patient and/or patient representative was provided with a choice of provider and agrees   with the discharge plan. Yes [x] No []    Freedom of choice list was provided with basic dialogue that supports the patient's individualized plan of care/goals and shares the quality data associated with the providers. Yes [x] No []    SNF/Rehab Transition:  Patient has been accepted to [facility] at [address] [place] [address] for SNF and meets criteria for admission. Patient will transported by medical transport and expected to leave at 3:00pm pending delivery of brace. Communication to SNF/Rehab:  Bedside RN,  has been notified to update the transition plan to the facility and call report [phone]. Discharge information has been updated on the AVS and communicated through [facility] or CC Link. Discharge instructions to be fax'd to facility at [phone]     Please include all hard scripts for controlled substances, med rec and dc summary, and AVS in packet. Please medicate for pain prior to dc if possible and needed to help offset delay when patient first arrives to facility.     Reviewed and confirmed with facility, [facility] can manage the patient care needs for the following:     Mega Simmons with (X) only those applicable:  Medication:  []Medications are available at the facility  []IV Antibiotics    []Controlled Substance - hard copies available sent. []Weekly Labs    Equipment:  []CPAP/BiPAP  []Wound Vacuum  []Humphreys or Urinary Device  []PICC/Central Line  []Nebulizer  []Ventilator    Treatment:  []Isolation (for MRSA, VRE, etc.)  []Surgical Drain Management  []Tracheostomy Care  []Dressing Changes  []Dialysis with transportation  []PEG Care  []Oxygen  []Daily Weights for Heart Failure    Dietary:  []Any diet limitations  []Tube Feedings   []Total Parenteral Management (TPN)    Financial Resources:  []Medicaid Application Completed    []UAI Completed and copy given to pt/family. []A screening has previously been completed. []Level II Completed    [] Private pay individual who will not become   financially eligible for Medicaid within 6 months from admission to a [address] [place] [address] facility. [] Individual refused to have screening conducted. []Medicaid Application Completed    []The screening denied because it was determined individual did not need/did not qualify for nursing facility level of care. [] Out of state residents seeking direct admission to a [address] facility. [] Individuals who are inpatients of an out of state hospital, or in state or out of state veterans/ hospital and seek direct admission to a [address] facility  [] Individuals who are pateints or residents of a state owned/operated facility that is licensed by Department of [employer] and seek direct admission to [address] [place] [address]  [] A screening not required for enrollment in [address] [address] [address] services as set out in [address] [place] [address] 61-  [] [facility]) staff shall perform screenings of the [facility] clients. Advanced Care Plan:  []Surrogate Decision Maker of Care  []POA  []Communicated Code Status and copy sent.     Other:         Care Management Interventions  Mode of Transport at Discharge: BLS  Transition of Care Consult (CM Consult): SNF  Partner SNF: Yes  Health Maintenance Reviewed: Yes  Physical Therapy Consult: Yes  Occupational Therapy Consult: Yes  Current Support Network: Lives with Spouse, Family Lives Nearby  Confirm Follow Up Transport: Family  The Plan for Transition of Care is Related to the Following Treatment Goals : [address]  The Patient and/or Patient Representative was Provided with a Choice of Provider and Agrees with the Discharge Plan?: Yes  Name of the Patient Representative Who was Provided with a Choice of Provider and Agrees with the Discharge Plan: spouse Sadie Feldmna)  [facility] of Choice List was Provided with Basic Dialogue that Supports the Patient's Individualized Plan of Care/Goals, Treatment Preferences and Shares the Quality Data Associated with the Providers?: Yes  Discharge Location  Discharge Placement: Skilled nursing facility

## 2021-07-15 NOTE — PROGRESS NOTES
1425- Report given to receiving facility RN, [facility], [address].     1436- tele box 38 returned

## 2021-07-15 NOTE — PROGRESS NOTES
Palliative Medicine    After meeting with patient, the goals of care have been defined. Code status remains: Partial code-NO chest compressions, shock or ACLS medications in the event of cardiac arrest.  OK with intubation pre-arrest and ventilation for 2 weeks-NO tracheostomy. Will sign off but remain available for reconsult as needed/if appropriate.      Thank you for the Palliative Medicine consult and allowing us to participate in the care of  Irma Browning RN, MSN  Palliative Medicine     [phone]

## 2021-07-19 ENCOUNTER — HOSPITAL ENCOUNTER (OUTPATIENT)
Dept: LAB | Age: 76
Discharge: HOME | End: 2021-07-19

## 2021-07-19 LAB
ALBUMIN SERPL-MCNC: 1.9 G/DL (ref 3.4–5)
ALBUMIN/GLOB SERPL: 0.5 {RATIO} (ref 0.8–1.7)
ALP SERPL-CCNC: 130 U/L (ref 45–117)
ALT SERPL-CCNC: 10 U/L (ref 16–61)
ANION GAP SERPL CALC-SCNC: 8 MMOL/L (ref 3–18)
AST SERPL-CCNC: 14 U/L (ref 10–38)
BASOPHILS # BLD: 0 K/UL (ref 0–0.1)
BASOPHILS NFR BLD: 0 % (ref 0–2)
BILIRUB SERPL-MCNC: 0.2 MG/DL (ref 0.2–1)
BUN SERPL-MCNC: 29 MG/DL (ref 7–18)
BUN/CREAT SERPL: 27 (ref 12–20)
CALCIUM SERPL-MCNC: 8.1 MG/DL (ref 8.5–10.1)
CHLORIDE SERPL-SCNC: 102 MMOL/L (ref 100–111)
CO2 SERPL-SCNC: 30 MMOL/L (ref 21–32)
CREAT SERPL-MCNC: 1.08 MG/DL (ref 0.6–1.3)
DIFFERENTIAL METHOD BLD: ABNORMAL
EOSINOPHIL # BLD: 1.4 K/UL (ref 0–0.4)
EOSINOPHIL NFR BLD: 14 % (ref 0–5)
ERYTHROCYTE [DISTWIDTH] IN BLOOD BY AUTOMATED COUNT: 13.9 % (ref 11.6–14.5)
GLOBULIN SER CALC-MCNC: 3.5 G/DL (ref 2–4)
GLUCOSE SERPL-MCNC: 96 MG/DL (ref 74–99)
HCT VFR BLD AUTO: 27.4 % (ref 36–48)
HGB BLD-MCNC: 8.2 G/DL (ref 13–16)
LYMPHOCYTES # BLD: 0.8 K/UL (ref 0.9–3.6)
LYMPHOCYTES NFR BLD: 8 % (ref 21–52)
MAGNESIUM SERPL-MCNC: 1.9 MG/DL (ref 1.6–2.6)
MCH RBC QN AUTO: 26.9 PG (ref 24–34)
MCHC RBC AUTO-ENTMCNC: 29.9 G/DL (ref 31–37)
MCV RBC AUTO: 89.8 FL (ref 74–97)
MONOCYTES # BLD: 0.8 K/UL (ref 0.05–1.2)
MONOCYTES NFR BLD: 8 % (ref 3–10)
NEUTS SEG # BLD: 7 K/UL (ref 1.8–8)
NEUTS SEG NFR BLD: 70 % (ref 40–73)
PLATELET # BLD AUTO: 492 K/UL (ref 135–420)
PMV BLD AUTO: 9.3 FL (ref 9.2–11.8)
POTASSIUM SERPL-SCNC: 3.6 MMOL/L (ref 3.5–5.5)
PROT SERPL-MCNC: 5.4 G/DL (ref 6.4–8.2)
RBC # BLD AUTO: 3.05 M/UL (ref 4.35–5.65)
RBC MORPH BLD: ABNORMAL
SODIUM SERPL-SCNC: 140 MMOL/L (ref 136–145)
WBC # BLD AUTO: 10 K/UL (ref 4.6–13.2)

## 2021-07-19 PROCEDURE — 80053 COMPREHEN METABOLIC PANEL: CPT

## 2021-07-19 PROCEDURE — 85025 COMPLETE CBC W/AUTO DIFF WBC: CPT

## 2021-07-19 PROCEDURE — 83735 ASSAY OF MAGNESIUM: CPT

## 2021-07-22 ENCOUNTER — PATIENT OUTREACH (OUTPATIENT)
Dept: CASE MANAGEMENT | Age: 76
End: 2021-07-22

## 2021-07-22 NOTE — PROGRESS NOTES
Physician Progress Note      Connor Cam  CSN #:                  [id number]  :                       1945  ADMIT DATE:       2021 4:34 PM  [address] [place] DATE:        7/15/2021 3:43 PM  RESPONDING  PROVIDER #:        Sienna Duvall MD          QUERY TEXT:    Pt admitted with left femur fracture. Pt noted to have osteopenia on xray. If possible, please document in progress notes and discharge summary if you are evaluating and/or treating any of the following: The medical record reflects the following:  Risk Factors: 66yo male w/ PMH of hypertension, COPD on home O2, CKD3, prostate cancer, chronic anemia,  Clinical Indicators: Pt presented w/ left leg pain after he fell out of chair and states the lucila fell on top of his leg. Left femur xray: There is osteopenia. There is a total left hip arthroplasty without loosening of the hardware. Femoral head component is seated in the acetabular component. There is a vertical intercondylar fracture the distal femur with fracture extending approximately 13 cm  Treatment: Ortho consult, discussed surgical intervention vs. conservative management. Pt opted for conservative management with Virgen brace applied, NWB x6 weeks. Vincenzo Santana RN, BSN, [address]  [phone]  Options provided:  -- Pathological left femur fracture  -- Osteoporotic left femur fracture  -- Osteoporotic left femur fracture following fall which would not usually break a normal, healthy bone  -- Traumatic left femur fracture  -- Other - I will add my own diagnosis  -- Disagree - Not applicable / Not valid  -- Disagree - Clinically unable to determine / Unknown  -- Refer to Clinical Documentation Reviewer    PROVIDER RESPONSE TEXT:    This patient has an osteoporotic fracture of left femur fracture.     Query created by: Marco Antonio Webber on 2021 12:19 PM      Electronically signed by:  Sienna Duvall MD 2021 5:55 PM

## 2021-07-23 NOTE — PROGRESS NOTES
Post Acute Facility Update     *The information contained in this note was received during a weekly care coordination call with the post acute facility*    This patient was discharged from [facility] to [facility] [place] ([facility])   on 7/15/21. Hospital Discharge Diagnosis per Dr. James Mcdaniel:    Fracture left Femur  Open wound Left Hip  Hip replacement Left Hip  Chronic Resp Failure with Hypoxia  CKD  COPD  HTN    Current Facility: [address] ([facility])  Anticipated Discharge Date: TBD    Facility Nursing Update: no new orders  Facility Social Work Update: lives with wife  Bundle Program Status: none  Upper Extremity Assistance: Moderate Assistance   Lower Extremity Assistance: Mod/Max Assistance  Bed Mobility: Moderate Assistance   Transfers: Maximum Assistance  Ambulation: Dependent  How far (in feet) is the patient ambulating?  None at this time  Device Used:    Barriers to Discharge: lives w/ wife, on home oxygen  Other:

## 2021-07-29 ENCOUNTER — PATIENT OUTREACH (OUTPATIENT)
Dept: CASE MANAGEMENT | Age: 76
End: 2021-07-29

## 2021-07-29 NOTE — PROGRESS NOTES
Post Acute Facility Update     *The information contained in this note was received during a weekly care coordination call with the post acute facility*    This patient was [place] [facility] [place] ([facility])   on 7/15/21. Hospital Discharge Diagnosis per Dr. Rosina Galvan:    Fracture left Femur  Open wound Left Hip  Hip replacement Left Hip  Chronic Resp Failure with Hypoxia  CKD  COPD  HTN    Current Facility: [address] ([facility])  Anticipated Discharge Date: TBD    Facility Nursing Update: no new orders  Facility Social Work Update: lives with wife  Bundle Program Status: none  Upper Extremity Assistance: Minimal Assistance   Lower Extremity Assistance: Min/Mod Assistance  Bed Mobility: Minimal Assistance   Transfers: Moderate Assistance   Ambulation: Dependent  How far (in feet) is the patient ambulating?  None at this time  Device Used:    Barriers to Discharge: lives w/ wife, on home oxygen  Other:  NWB on Left, non ambulatory at this time

## 2021-08-03 ENCOUNTER — HOSPITAL ENCOUNTER (OUTPATIENT)
Dept: LAB | Age: 76
Discharge: HOME | End: 2021-08-03

## 2021-08-03 LAB
ALBUMIN SERPL-MCNC: 2.6 G/DL (ref 3.4–5)
ALBUMIN/GLOB SERPL: 0.7 {RATIO} (ref 0.8–1.7)
ALP SERPL-CCNC: 165 U/L (ref 45–117)
ALT SERPL-CCNC: 9 U/L (ref 16–61)
ANION GAP SERPL CALC-SCNC: 4 MMOL/L (ref 3–18)
AST SERPL-CCNC: 12 U/L (ref 10–38)
BASOPHILS # BLD: 0 K/UL (ref 0–0.1)
BASOPHILS NFR BLD: 0 % (ref 0–2)
BILIRUB SERPL-MCNC: 0.2 MG/DL (ref 0.2–1)
BUN SERPL-MCNC: 21 MG/DL (ref 7–18)
BUN/CREAT SERPL: 22 (ref 12–20)
CALCIUM SERPL-MCNC: 8.5 MG/DL (ref 8.5–10.1)
CHLORIDE SERPL-SCNC: 106 MMOL/L (ref 100–111)
CO2 SERPL-SCNC: 29 MMOL/L (ref 21–32)
CREAT SERPL-MCNC: 0.97 MG/DL (ref 0.6–1.3)
DIFFERENTIAL METHOD BLD: ABNORMAL
EOSINOPHIL # BLD: 1.9 K/UL (ref 0–0.4)
EOSINOPHIL NFR BLD: 17 % (ref 0–5)
ERYTHROCYTE [DISTWIDTH] IN BLOOD BY AUTOMATED COUNT: 14.3 % (ref 11.6–14.5)
GLOBULIN SER CALC-MCNC: 3.5 G/DL (ref 2–4)
GLUCOSE SERPL-MCNC: 81 MG/DL (ref 74–99)
HCT VFR BLD AUTO: 32.3 % (ref 36–48)
HGB BLD-MCNC: 9.6 G/DL (ref 13–16)
LYMPHOCYTES # BLD: 1.1 K/UL (ref 0.9–3.6)
LYMPHOCYTES NFR BLD: 10 % (ref 21–52)
MAGNESIUM SERPL-MCNC: 2 MG/DL (ref 1.6–2.6)
MCH RBC QN AUTO: 27 PG (ref 24–34)
MCHC RBC AUTO-ENTMCNC: 29.7 G/DL (ref 31–37)
MCV RBC AUTO: 91 FL (ref 74–97)
MONOCYTES # BLD: 0.9 K/UL (ref 0.05–1.2)
MONOCYTES NFR BLD: 8 % (ref 3–10)
NEUTS SEG # BLD: 7.3 K/UL (ref 1.8–8)
NEUTS SEG NFR BLD: 65 % (ref 40–73)
PLATELET # BLD AUTO: 487 K/UL (ref 135–420)
PLATELET COMMENTS,PCOM: ABNORMAL
PMV BLD AUTO: 9 FL (ref 9.2–11.8)
POTASSIUM SERPL-SCNC: 4.3 MMOL/L (ref 3.5–5.5)
PROT SERPL-MCNC: 6.1 G/DL (ref 6.4–8.2)
RBC # BLD AUTO: 3.55 M/UL (ref 4.35–5.65)
RBC MORPH BLD: ABNORMAL
SODIUM SERPL-SCNC: 139 MMOL/L (ref 136–145)
WBC # BLD AUTO: 11.2 K/UL (ref 4.6–13.2)

## 2021-08-03 PROCEDURE — 85025 COMPLETE CBC W/AUTO DIFF WBC: CPT

## 2021-08-03 PROCEDURE — 80053 COMPREHEN METABOLIC PANEL: CPT

## 2021-08-03 PROCEDURE — 83735 ASSAY OF MAGNESIUM: CPT

## 2021-08-05 ENCOUNTER — PATIENT OUTREACH (OUTPATIENT)
Dept: CASE MANAGEMENT | Age: 76
End: 2021-08-05

## 2021-08-06 NOTE — PROGRESS NOTES
Post Acute Facility Update     *The information contained in this note was received during a weekly care coordination call with the post acute facility*    This patient was discharged from [place] [address] to [facility] [place] ([facility])   on 7/15/21. Hospital Discharge Diagnosis per Dr. Silvana Pabon:    Fracture left Femur  Open wound Left Hip  Hip replacement Left Hip  Chronic Resp Failure with Hypoxia  CKD  COPD  HTN    Current Facility: [address] ([facility])  Anticipated Discharge Date: TBD    Facility Nursing Update: no new orders  Facility Social Work Update: lives with wife  Bundle Program Status: none  Upper Extremity Assistance: Minimal Assistance   Lower Extremity Assistance: Min/Mod Assistance  Bed Mobility: Minimal Assistance   Transfers: Mod/Max Assistance  Ambulation: Dependent  How far (in feet) is the patient ambulating?  None at this time  Device Used:    Barriers to Discharge: lives w/ wife, on home oxygen  Other:  NWB on Left, non ambulatory at this time

## 2021-08-07 ENCOUNTER — HOSPITAL ENCOUNTER (OUTPATIENT)
Dept: LAB | Age: 76
Discharge: HOME | End: 2021-08-07

## 2021-08-07 PROCEDURE — U0003 INFECTIOUS AGENT DETECTION BY NUCLEIC ACID (DNA OR RNA); SEVERE ACUTE RESPIRATORY SYNDROME CORONAVIRUS 2 (SARS-COV-2) (CORONAVIRUS DISEASE [COVID-19]), AMPLIFIED PROBE TECHNIQUE, MAKING USE OF HIGH THROUGHPUT TECHNOLOGIES AS DESCRIBED BY CMS-2020-01-R: HCPCS

## 2021-08-09 NOTE — WOUND CARE
[address] [facility]  Follow up note. Chief Complaint:  Fabrice Adams is a 66 y.o.  male who presents for follow up of left hip open wound along incision site. 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 Cardiovascular: No chest pain or pressure. No palpitations. Pulmonary: No shortness of breath. Gastrointestinal: No nausea, vomiting. Derm: See above                Physical Exam:     Visit Vitals  BP (!) 121/54   Pulse 78   Temp 98.5 °F (36.9 °C)   Resp 22   SpO2 100%     General: well developed, well nourished, pleasant , NAD. Hygiene good  Psych: cooperative. Pleasant. No anxiety or depression. Normal mood and affect. Neuro: alert and oriented to person/place/situation. Otherwise nonfocal.  Derm: Skin turgor normal for age, dry skin  HEENT: Normocephalic, atraumatic. EOMI. Conjunctiva clear. No scleral icterus. Chest: Good air entry bilaterally. Respirations non labored  Cardio[de-identified] Normal heart sounds,no rubs, murmurs or gallops  Abdomen: Soft, nontender, nondistended, normoactive bowel sounds  Lower extremities: color normal; temperature normal. Calves are supple, nontender.  Capillary refill <3 sec      Wound Description:   Wound Length:      Wound Width :     Wound Depth :     Wound Hip Left   Date First Assessed/Time First Assessed: 05/26/21 1440   Present on Hospital Admission: Yes  Primary Wound Type: Open incision/surgical site  Location: Hip  Wound Location Orientation: Left   Wound Image    Wound Etiology Non-Healing Surgical   Dressing Status Intact   Cleansed Wound cleanser   Dressing/Treatment Other (Comment)   Dressing Change Due 07/08/21   Wound Length (cm) 1.5 cm   Wound Width (cm) 2 cm   Wound Depth (cm) 0.3 cm   Wound Surface Area (cm^2) 3 cm^2   Change in Wound Size % 33.33   Wound Volume (cm^3) 0.9 cm^3   Wound Healing % 10   Wound Assessment Pale granulation tissue;Slough Drainage Amount Moderate   Drainage Description Serosanguinous   Wound Odor None   Camryn-Wound/Incision Assessment Intact   Edges Unattached edges   Wound Thickness Description Full thickness          Data Review:   No results found for this or any previous visit (from the past 24 hour(s)). Assessment:     Patient Active Problem List   Diagnosis Code    Hypertension I10    COPD (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 ([facility]) J44.9    Chronic renal disease, stage 3, moderately decreased glomerular filtration rate between 30-59 mL/min/1.73 square meter ([facility]) N18.30    Debility R53.81    Chronic respiratory failure with hypoxia ([facility]) J96.11    Tobacco dependence F17.200    Left hip pain M25.552    PAF (paroxysmal atrial fibrillation) ([facility]) I48.0    Avascular necrosis of bone of left hip ([facility]) M87.052    Obesity E66.9    Acute pulmonary edema ([facility]) J81.0    Anemia D64.9    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 02XA    Scrotal edema N50.89    Open wound of left hip S71.002A    Intercondylar fracture of femur ([facility]) S72.413A    Left leg pain M79.605     66 y.o. male with open wound of left hip. Recent left hip surgery   Concern for infection, prescribed course of doxycycline.   Needs :  Serial debridement- debrided today- see note below  Good local wound care  Edema management  Nutrition optimization    Plan:   In wound care clinic today:   Cleanse wound with NS or soap and water or commercial wound cleanser   Apply the following topically to wound bed:santyl   Apply the following dressings: bordered dressing     For Home Care/Self Care   Frequency:daily   Cleanse wound with NS or soap and water or commercial wound cleanser   Keep dressing dry and intact when bathing   Apply the following to wound bed:santyl   Apply the following dressings: cover with dry dressing     Leave dressings in place until next visit Patient to return for wound care in: 7 Days     PLEASE CONTACT OFFICE AS SOON AS POSSIBLE IF UNABLE TO MAKE THIS APPOINTMENT. Inspect your wounds, looking for signs of infection which may include the following:  Increase in redness  Red \"streaks\" from wound  Increase in swelling  Fever  Unusual odor  Change in the amount of wound drainage. Should you experience any significant changes in your wound(s) or have any questions regarding your home care instructions please contact the wound center or your home health company. If after regular business hours, please call your family doctor or local emergency room. Edema Control: Elevate legs as much as possible. Avoid standing in one position for more than 10 minutes. Avoid setting with legs down. Do not cross legs while sitting. Off-Loading:Frequent position changes. Do not cross legs while sitting. Shift weight every 20 minutes or more when sitting for prolonged periods of time.     Signed By: Jesenia Gilmore MD     August 8, 2021

## 2021-08-10 LAB — SARS-COV-2, COV2NT: NOT DETECTED

## 2021-08-11 ENCOUNTER — HOSPITAL ENCOUNTER (OUTPATIENT)
Dept: LAB | Age: 76
Discharge: HOME | End: 2021-08-11

## 2021-08-11 PROCEDURE — 87205 SMEAR GRAM STAIN: CPT

## 2021-08-11 PROCEDURE — 87077 CULTURE AEROBIC IDENTIFY: CPT

## 2021-08-11 PROCEDURE — U0003 INFECTIOUS AGENT DETECTION BY NUCLEIC ACID (DNA OR RNA); SEVERE ACUTE RESPIRATORY SYNDROME CORONAVIRUS 2 (SARS-COV-2) (CORONAVIRUS DISEASE [COVID-19]), AMPLIFIED PROBE TECHNIQUE, MAKING USE OF HIGH THROUGHPUT TECHNOLOGIES AS DESCRIBED BY CMS-2020-01-R: HCPCS

## 2021-08-11 PROCEDURE — 87186 SC STD MICRODIL/AGAR DIL: CPT

## 2021-08-12 ENCOUNTER — PATIENT OUTREACH (OUTPATIENT)
Dept: CASE MANAGEMENT | Age: 76
End: 2021-08-12

## 2021-08-12 LAB — SARS-COV-2, COV2NT: NOT DETECTED

## 2021-08-13 NOTE — PROGRESS NOTES
Post Acute Facility Update     *The information contained in this note was received during a weekly care coordination call with the post acute facility*    This patient was discharged from [place] [address] to [facility] [place] ([facility])   on 7/15/21. Hospital Discharge Diagnosis per Dr. James Mcdaniel:    Fracture left Femur  Open wound Left Hip  Hip replacement Left Hip  Chronic Resp Failure with Hypoxia  CKD  COPD  HTN    Current Facility: [address] ([facility])  Anticipated Discharge Date: D    Facility Nursing Update: WBAT now but due to pain, pt will not put any weight on Left foot. Facility Social Work Update: lives with wife  Bundle Program Status: none  Upper Extremity Assistance: Minimal Assistance   Lower Extremity Assistance: Maximum Assistance  Bed Mobility: Stand by Assist - Presence and Cueing  Transfers: Maximum Assistance  Ambulation: Dependent  How far (in feet) is the patient ambulating?  None at this time  Device Used:    Barriers to Discharge: lives w/ wife, on home oxygen  Other:  none

## 2021-08-14 LAB
BACTERIA SPEC CULT: ABNORMAL
BACTERIA SPEC CULT: ABNORMAL
GRAM STN SPEC: ABNORMAL
GRAM STN SPEC: ABNORMAL
SERVICE CMNT-IMP: ABNORMAL

## 2021-08-14 NOTE — WOUND CARE
[address] [facility]  Follow up note. Chief Complaint:  Fabrice Adams is a 66 y.o.  male who presents for follow up of left hip open wound along incision site. 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 Cardiovascular: No chest pain or pressure. No palpitations. Pulmonary: No shortness of breath. Gastrointestinal: No nausea, vomiting. Derm: See above                Physical Exam:     Visit Vitals  BP (!) 117/40 (BP 1 Location: Left upper arm)   Pulse 81   Temp 98.5 °F (36.9 °C)   Resp 22   SpO2 100%     General: well developed, well nourished, pleasant , NAD. Hygiene good  Psych: cooperative. Pleasant. No anxiety or depression. Normal mood and affect. Neuro: alert and oriented to person/place/situation. Otherwise nonfocal.  Derm: Skin turgor normal for age, dry skin  HEENT: Normocephalic, atraumatic. EOMI. Conjunctiva clear. No scleral icterus. Chest: Good air entry bilaterally. Respirations non labored  Cardio[de-identified] Normal heart sounds,no rubs, murmurs or gallops  Abdomen: Soft, nontender, nondistended, normoactive bowel sounds  Lower extremities: color normal; temperature normal. Calves are supple, nontender.  Capillary refill <3 sec      Wound Description:   Wound Length:      Wound Width :     Wound Depth :     Wound Hip Left   Date First Assessed/Time First Assessed: 05/26/21 1440   Present on Hospital Admission: Yes  Primary Wound Type: Open incision/surgical site  Location: Hip  Wound Location Orientation: Left   Wound Image    Wound Etiology Non-Healing Surgical   Dressing Status Intact   Cleansed Cleansed with saline   Dressing/Treatment Other (Comment)  (santyl)   Dressing Change Due 07/07/21   Wound Length (cm) 1.5 cm   Wound Width (cm) 2 cm   Wound Depth (cm) 0.5 cm   Wound Surface Area (cm^2) 3 cm^2   Change in Wound Size % 33.33   Wound Volume (cm^3) 1.5 cm^3   Wound Healing % -50 Wound Assessment Slough   Drainage Amount Small   Drainage Description Serous; Yellow   Wound Odor None   Camryn-Wound/Incision Assessment Intact   Edges Unattached edges   Wound Thickness Description Full thickness        Data Review:   No results found for this or any previous visit (from the past 24 hour(s)). Assessment:     Patient Active Problem List   Diagnosis Code    Hypertension I10    COPD (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 ([facility]) J44.9    Chronic renal disease, stage 3, moderately decreased glomerular filtration rate between 30-59 mL/min/1.73 square meter ([facility]) N18.30    Debility R53.81    Chronic respiratory failure with hypoxia ([facility]) J96.11    Tobacco dependence F17.200    Left hip pain M25.552    PAF (paroxysmal atrial fibrillation) ([facility]) I48.0    Avascular necrosis of bone of left hip ([facility]) M87.052    Obesity E66.9    Acute pulmonary edema ([facility]) J81.0    Anemia D64.9    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 02XA    Scrotal edema N50.89    Open wound of left hip S71.002A    Intercondylar fracture of femur ([facility]) S72.413A    Left leg pain M79.605     66 y.o. male with open wound of left hip. Recent left hip surgery   Concern for infection, prescribed course of doxycycline.   Needs :  Serial debridement- debrided today- see note below  Good local wound care  Edema management  Nutrition optimization    Plan:   In wound care clinic today:   Cleanse wound with NS or soap and water or commercial wound cleanser   Apply the following topically to wound bed:santyl   Apply the following dressings: bordered dressing     For Home Care/Self Care   Frequency:daily   Cleanse wound with NS or soap and water or commercial wound cleanser   Keep dressing dry and intact when bathing   Apply the following to wound bed:santyl   Apply the following dressings: cover with dry dressing     Leave dressings in place until next visit     Patient to return for wound care in: 7 Days     PLEASE CONTACT OFFICE AS SOON AS POSSIBLE IF UNABLE TO MAKE THIS APPOINTMENT. Inspect your wounds, looking for signs of infection which may include the following:  Increase in redness  Red \"streaks\" from wound  Increase in swelling  Fever  Unusual odor  Change in the amount of wound drainage. Should you experience any significant changes in your wound(s) or have any questions regarding your home care instructions please contact the wound center or your home health company. If after regular business hours, please call your family doctor or local emergency room. Edema Control: Elevate legs as much as possible. Avoid standing in one position for more than 10 minutes. Avoid setting with legs down. Do not cross legs while sitting. Off-Loading:Frequent position changes. Do not cross legs while sitting. Shift weight every 20 minutes or more when sitting for prolonged periods of time.       Signed By: Rakesh Veronica MD     August 14, 2021

## 2021-08-18 ENCOUNTER — HOSPITAL ENCOUNTER (OUTPATIENT)
Dept: LAB | Age: 76
Discharge: HOME | End: 2021-08-18

## 2021-08-18 PROCEDURE — U0003 INFECTIOUS AGENT DETECTION BY NUCLEIC ACID (DNA OR RNA); SEVERE ACUTE RESPIRATORY SYNDROME CORONAVIRUS 2 (SARS-COV-2) (CORONAVIRUS DISEASE [COVID-19]), AMPLIFIED PROBE TECHNIQUE, MAKING USE OF HIGH THROUGHPUT TECHNOLOGIES AS DESCRIBED BY CMS-2020-01-R: HCPCS

## 2021-08-19 ENCOUNTER — HOSPITAL ENCOUNTER (OUTPATIENT)
Dept: LAB | Age: 76
Discharge: HOME | End: 2021-08-19

## 2021-08-19 LAB
ALBUMIN SERPL-MCNC: 2.4 G/DL (ref 3.4–5)
ALBUMIN/GLOB SERPL: 0.7 {RATIO} (ref 0.8–1.7)
ALP SERPL-CCNC: 99 U/L (ref 45–117)
ALT SERPL-CCNC: 11 U/L (ref 16–61)
ANION GAP SERPL CALC-SCNC: 4 MMOL/L (ref 3–18)
AST SERPL-CCNC: 9 U/L (ref 10–38)
BASOPHILS # BLD: 0 K/UL (ref 0–0.1)
BASOPHILS NFR BLD: 0 % (ref 0–2)
BILIRUB SERPL-MCNC: 0.2 MG/DL (ref 0.2–1)
BUN SERPL-MCNC: 32 MG/DL (ref 7–18)
BUN/CREAT SERPL: 33 (ref 12–20)
CALCIUM SERPL-MCNC: 8.5 MG/DL (ref 8.5–10.1)
CHLORIDE SERPL-SCNC: 108 MMOL/L (ref 100–111)
CO2 SERPL-SCNC: 28 MMOL/L (ref 21–32)
CREAT SERPL-MCNC: 0.96 MG/DL (ref 0.6–1.3)
DIFFERENTIAL METHOD BLD: ABNORMAL
EOSINOPHIL # BLD: 0.4 K/UL (ref 0–0.4)
EOSINOPHIL NFR BLD: 5 % (ref 0–5)
ERYTHROCYTE [DISTWIDTH] IN BLOOD BY AUTOMATED COUNT: 14.2 % (ref 11.6–14.5)
GLOBULIN SER CALC-MCNC: 3.3 G/DL (ref 2–4)
GLUCOSE SERPL-MCNC: 92 MG/DL (ref 74–99)
HCT VFR BLD AUTO: 27.1 % (ref 36–48)
HGB BLD-MCNC: 8.3 G/DL (ref 13–16)
LYMPHOCYTES # BLD: 1.1 K/UL (ref 0.9–3.6)
LYMPHOCYTES NFR BLD: 15 % (ref 21–52)
MAGNESIUM SERPL-MCNC: 1.9 MG/DL (ref 1.6–2.6)
MCH RBC QN AUTO: 26.6 PG (ref 24–34)
MCHC RBC AUTO-ENTMCNC: 30.6 G/DL (ref 31–37)
MCV RBC AUTO: 86.9 FL (ref 74–97)
MONOCYTES # BLD: 0.6 K/UL (ref 0.05–1.2)
MONOCYTES NFR BLD: 8 % (ref 3–10)
NEUTS SEG # BLD: 5.2 K/UL (ref 1.8–8)
NEUTS SEG NFR BLD: 70 % (ref 40–73)
PLATELET # BLD AUTO: 291 K/UL (ref 135–420)
PMV BLD AUTO: 9.2 FL (ref 9.2–11.8)
POTASSIUM SERPL-SCNC: 3.9 MMOL/L (ref 3.5–5.5)
PROT SERPL-MCNC: 5.7 G/DL (ref 6.4–8.2)
RBC # BLD AUTO: 3.12 M/UL (ref 4.35–5.65)
SARS-COV-2, COV2NT: NOT DETECTED
SODIUM SERPL-SCNC: 140 MMOL/L (ref 136–145)
WBC # BLD AUTO: 7.4 K/UL (ref 4.6–13.2)

## 2021-08-19 PROCEDURE — 85025 COMPLETE CBC W/AUTO DIFF WBC: CPT

## 2021-08-19 PROCEDURE — 80053 COMPREHEN METABOLIC PANEL: CPT

## 2021-08-19 PROCEDURE — 83735 ASSAY OF MAGNESIUM: CPT

## 2021-08-20 ENCOUNTER — HOSPITAL ENCOUNTER (INPATIENT)
Age: 76
LOS: 6 days | Discharge: NURSING, REHAB OR LONG-TERM CARE | DRG: 464 | End: 2021-08-26
Attending: EMERGENCY MEDICINE | Admitting: FAMILY MEDICINE
Payer: MEDICARE

## 2021-08-20 ENCOUNTER — PATIENT OUTREACH (OUTPATIENT)
Dept: CASE MANAGEMENT | Age: 76
End: 2021-08-20

## 2021-08-20 ENCOUNTER — APPOINTMENT (OUTPATIENT)
Dept: MRI IMAGING | Age: 76
DRG: 464 | End: 2021-08-20
Attending: EMERGENCY MEDICINE
Payer: MEDICARE

## 2021-08-20 ENCOUNTER — APPOINTMENT (OUTPATIENT)
Dept: GENERAL RADIOLOGY | Age: 76
DRG: 464 | End: 2021-08-20
Attending: EMERGENCY MEDICINE
Payer: MEDICARE

## 2021-08-20 DIAGNOSIS — Z22.322 MRSA (METHICILLIN RESISTANT STAPH AUREUS) CULTURE POSITIVE: ICD-10-CM

## 2021-08-20 DIAGNOSIS — Z99.81 SUPPLEMENTAL OXYGEN DEPENDENT: ICD-10-CM

## 2021-08-20 DIAGNOSIS — S91.002A OPEN WOUND OF LEFT ANKLE, INITIAL ENCOUNTER: ICD-10-CM

## 2021-08-20 DIAGNOSIS — R54 AGE-RELATED PHYSICAL DEBILITY: ICD-10-CM

## 2021-08-20 DIAGNOSIS — L08.9 INFECTED WOUND: ICD-10-CM

## 2021-08-20 DIAGNOSIS — M86.162 OTHER ACUTE OSTEOMYELITIS OF LEFT TIBIA (HCC): Primary | ICD-10-CM

## 2021-08-20 DIAGNOSIS — J44.9 CHRONIC OBSTRUCTIVE PULMONARY DISEASE, UNSPECIFIED COPD TYPE (HCC): ICD-10-CM

## 2021-08-20 DIAGNOSIS — T14.8XXA INFECTED WOUND: ICD-10-CM

## 2021-08-20 PROBLEM — M86.9 PYOGENIC INFLAMMATION OF BONE (HCC): Status: ACTIVE | Noted: 2021-08-20

## 2021-08-20 PROBLEM — M86.9 OSTEOMYELITIS OF LEFT ANKLE (HCC): Status: ACTIVE | Noted: 2021-08-20

## 2021-08-20 LAB
ANION GAP SERPL CALC-SCNC: 4 MMOL/L (ref 3–18)
APPEARANCE UR: CLEAR
BASOPHILS # BLD: 0.1 K/UL (ref 0–0.1)
BASOPHILS NFR BLD: 1 % (ref 0–2)
BILIRUB UR QL: NEGATIVE
BUN SERPL-MCNC: 32 MG/DL (ref 7–18)
BUN/CREAT SERPL: 32 (ref 12–20)
CALCIUM SERPL-MCNC: 9 MG/DL (ref 8.5–10.1)
CHLORIDE SERPL-SCNC: 104 MMOL/L (ref 100–111)
CO2 SERPL-SCNC: 31 MMOL/L (ref 21–32)
COLOR UR: YELLOW
CREAT SERPL-MCNC: 1.01 MG/DL (ref 0.6–1.3)
DIFFERENTIAL METHOD BLD: ABNORMAL
EOSINOPHIL # BLD: 2.1 K/UL (ref 0–0.4)
EOSINOPHIL NFR BLD: 19 % (ref 0–5)
ERYTHROCYTE [DISTWIDTH] IN BLOOD BY AUTOMATED COUNT: 14.2 % (ref 11.6–14.5)
GLUCOSE SERPL-MCNC: 83 MG/DL (ref 74–99)
GLUCOSE UR STRIP.AUTO-MCNC: NEGATIVE MG/DL
HCT VFR BLD AUTO: 32.5 % (ref 36–48)
HGB BLD-MCNC: 10.1 G/DL (ref 13–16)
HGB UR QL STRIP: NEGATIVE
KETONES UR QL STRIP.AUTO: NEGATIVE MG/DL
LACTATE SERPL-SCNC: 0.7 MMOL/L (ref 0.4–2)
LEUKOCYTE ESTERASE UR QL STRIP.AUTO: NEGATIVE
LYMPHOCYTES # BLD: 1.1 K/UL (ref 0.9–3.6)
LYMPHOCYTES NFR BLD: 10 % (ref 21–52)
MCH RBC QN AUTO: 27.1 PG (ref 24–34)
MCHC RBC AUTO-ENTMCNC: 31.1 G/DL (ref 31–37)
MCV RBC AUTO: 87.1 FL (ref 74–97)
MONOCYTES # BLD: 0.6 K/UL (ref 0.05–1.2)
MONOCYTES NFR BLD: 6 % (ref 3–10)
NEUTS SEG # BLD: 6.9 K/UL (ref 1.8–8)
NEUTS SEG NFR BLD: 64 % (ref 40–73)
NITRITE UR QL STRIP.AUTO: NEGATIVE
PH UR STRIP: 6.5 [PH] (ref 5–8)
PLATELET # BLD AUTO: 344 K/UL (ref 135–420)
PLATELET COMMENTS,PCOM: ABNORMAL
PMV BLD AUTO: 9 FL (ref 9.2–11.8)
POTASSIUM SERPL-SCNC: 4.2 MMOL/L (ref 3.5–5.5)
PROT UR STRIP-MCNC: NEGATIVE MG/DL
RBC # BLD AUTO: 3.73 M/UL (ref 4.35–5.65)
RBC MORPH BLD: ABNORMAL
SODIUM SERPL-SCNC: 139 MMOL/L (ref 136–145)
SP GR UR REFRACTOMETRY: 1.01 (ref 1–1.03)
UROBILINOGEN UR QL STRIP.AUTO: 0.2 EU/DL (ref 0.2–1)
WBC # BLD AUTO: 10.8 K/UL (ref 4.6–13.2)

## 2021-08-20 PROCEDURE — 94640 AIRWAY INHALATION TREATMENT: CPT

## 2021-08-20 PROCEDURE — 73610 X-RAY EXAM OF ANKLE: CPT

## 2021-08-20 PROCEDURE — 73630 X-RAY EXAM OF FOOT: CPT

## 2021-08-20 PROCEDURE — 81003 URINALYSIS AUTO W/O SCOPE: CPT

## 2021-08-20 PROCEDURE — 87205 SMEAR GRAM STAIN: CPT

## 2021-08-20 PROCEDURE — 74011250636 HC RX REV CODE- 250/636: Performed by: EMERGENCY MEDICINE

## 2021-08-20 PROCEDURE — 85025 COMPLETE CBC W/AUTO DIFF WBC: CPT

## 2021-08-20 PROCEDURE — 83605 ASSAY OF LACTIC ACID: CPT

## 2021-08-20 PROCEDURE — 87040 BLOOD CULTURE FOR BACTERIA: CPT

## 2021-08-20 PROCEDURE — 96375 TX/PRO/DX INJ NEW DRUG ADDON: CPT

## 2021-08-20 PROCEDURE — 73590 X-RAY EXAM OF LOWER LEG: CPT

## 2021-08-20 PROCEDURE — 96365 THER/PROPH/DIAG IV INF INIT: CPT

## 2021-08-20 PROCEDURE — 87147 CULTURE TYPE IMMUNOLOGIC: CPT

## 2021-08-20 PROCEDURE — 74011250637 HC RX REV CODE- 250/637: Performed by: FAMILY MEDICINE

## 2021-08-20 PROCEDURE — 73620 X-RAY EXAM OF FOOT: CPT

## 2021-08-20 PROCEDURE — 87077 CULTURE AEROBIC IDENTIFY: CPT

## 2021-08-20 PROCEDURE — 65270000029 HC RM PRIVATE

## 2021-08-20 PROCEDURE — 74011250636 HC RX REV CODE- 250/636: Performed by: FAMILY MEDICINE

## 2021-08-20 PROCEDURE — 74011000250 HC RX REV CODE- 250: Performed by: FAMILY MEDICINE

## 2021-08-20 PROCEDURE — 99285 EMERGENCY DEPT VISIT HI MDM: CPT

## 2021-08-20 PROCEDURE — 80048 BASIC METABOLIC PNL TOTAL CA: CPT

## 2021-08-20 PROCEDURE — 87186 SC STD MICRODIL/AGAR DIL: CPT

## 2021-08-20 PROCEDURE — 74011000250 HC RX REV CODE- 250: Performed by: EMERGENCY MEDICINE

## 2021-08-20 RX ORDER — SODIUM CHLORIDE 0.9 % (FLUSH) 0.9 %
5-40 SYRINGE (ML) INJECTION AS NEEDED
Status: DISCONTINUED | OUTPATIENT
Start: 2021-08-20 | End: 2021-08-26 | Stop reason: HOSPADM

## 2021-08-20 RX ORDER — AMLODIPINE BESYLATE 5 MG/1
5 TABLET ORAL DAILY
Status: DISCONTINUED | OUTPATIENT
Start: 2021-08-21 | End: 2021-08-26 | Stop reason: HOSPADM

## 2021-08-20 RX ORDER — POLYVINYL ALCOHOL 14 MG/ML
2 SOLUTION/ DROPS OPHTHALMIC AS NEEDED
Status: DISCONTINUED | OUTPATIENT
Start: 2021-08-20 | End: 2021-08-26 | Stop reason: HOSPADM

## 2021-08-20 RX ORDER — MAG HYDROX/ALUMINUM HYD/SIMETH 200-200-20
15 SUSPENSION, ORAL (FINAL DOSE FORM) ORAL
Status: DISCONTINUED | OUTPATIENT
Start: 2021-08-20 | End: 2021-08-26 | Stop reason: HOSPADM

## 2021-08-20 RX ORDER — ACETAMINOPHEN 325 MG/1
650 TABLET ORAL
Status: DISCONTINUED | OUTPATIENT
Start: 2021-08-20 | End: 2021-08-26 | Stop reason: HOSPADM

## 2021-08-20 RX ORDER — VANCOMYCIN 2 GRAM/500 ML IN 0.9 % SODIUM CHLORIDE INTRAVENOUS
2000 ONCE
Status: COMPLETED | OUTPATIENT
Start: 2021-08-20 | End: 2021-08-20

## 2021-08-20 RX ORDER — TAMSULOSIN HYDROCHLORIDE 0.4 MG/1
0.4 CAPSULE ORAL EVERY EVENING
Status: DISCONTINUED | OUTPATIENT
Start: 2021-08-21 | End: 2021-08-26 | Stop reason: HOSPADM

## 2021-08-20 RX ORDER — HEPARIN SODIUM 5000 [USP'U]/ML
5000 INJECTION, SOLUTION INTRAVENOUS; SUBCUTANEOUS EVERY 8 HOURS
Status: DISCONTINUED | OUTPATIENT
Start: 2021-08-20 | End: 2021-08-26 | Stop reason: HOSPADM

## 2021-08-20 RX ORDER — IPRATROPIUM BROMIDE AND ALBUTEROL SULFATE 2.5; .5 MG/3ML; MG/3ML
3 SOLUTION RESPIRATORY (INHALATION)
Status: DISCONTINUED | OUTPATIENT
Start: 2021-08-20 | End: 2021-08-20

## 2021-08-20 RX ORDER — IPRATROPIUM BROMIDE AND ALBUTEROL SULFATE 2.5; .5 MG/3ML; MG/3ML
3 SOLUTION RESPIRATORY (INHALATION) EVERY 4 HOURS
Status: DISCONTINUED | OUTPATIENT
Start: 2021-08-21 | End: 2021-08-26 | Stop reason: HOSPADM

## 2021-08-20 RX ORDER — FUROSEMIDE 20 MG/1
20 TABLET ORAL DAILY
Status: DISCONTINUED | OUTPATIENT
Start: 2021-08-21 | End: 2021-08-26 | Stop reason: HOSPADM

## 2021-08-20 RX ORDER — LANOLIN ALCOHOL/MO/W.PET/CERES
325 CREAM (GRAM) TOPICAL 2 TIMES DAILY WITH MEALS
Status: DISCONTINUED | OUTPATIENT
Start: 2021-08-21 | End: 2021-08-26 | Stop reason: HOSPADM

## 2021-08-20 RX ORDER — ARFORMOTEROL TARTRATE 15 UG/2ML
15 SOLUTION RESPIRATORY (INHALATION) 2 TIMES DAILY
Status: DISCONTINUED | OUTPATIENT
Start: 2021-08-20 | End: 2021-08-21

## 2021-08-20 RX ORDER — BUDESONIDE 0.5 MG/2ML
500 INHALANT ORAL 2 TIMES DAILY
Status: DISCONTINUED | OUTPATIENT
Start: 2021-08-20 | End: 2021-08-21

## 2021-08-20 RX ORDER — ONDANSETRON 2 MG/ML
4 INJECTION INTRAMUSCULAR; INTRAVENOUS
Status: DISCONTINUED | OUTPATIENT
Start: 2021-08-20 | End: 2021-08-26 | Stop reason: HOSPADM

## 2021-08-20 RX ORDER — ONDANSETRON 4 MG/1
4 TABLET, ORALLY DISINTEGRATING ORAL
Status: DISCONTINUED | OUTPATIENT
Start: 2021-08-20 | End: 2021-08-26 | Stop reason: HOSPADM

## 2021-08-20 RX ORDER — KETOROLAC TROMETHAMINE 30 MG/ML
15 INJECTION, SOLUTION INTRAMUSCULAR; INTRAVENOUS ONCE
Status: COMPLETED | OUTPATIENT
Start: 2021-08-20 | End: 2021-08-20

## 2021-08-20 RX ORDER — POLYETHYLENE GLYCOL 3350 17 G/17G
17 POWDER, FOR SOLUTION ORAL DAILY PRN
Status: DISCONTINUED | OUTPATIENT
Start: 2021-08-20 | End: 2021-08-26 | Stop reason: HOSPADM

## 2021-08-20 RX ORDER — ACETAMINOPHEN 650 MG/1
650 SUPPOSITORY RECTAL
Status: DISCONTINUED | OUTPATIENT
Start: 2021-08-20 | End: 2021-08-26 | Stop reason: HOSPADM

## 2021-08-20 RX ORDER — IPRATROPIUM BROMIDE AND ALBUTEROL SULFATE 2.5; .5 MG/3ML; MG/3ML
3 SOLUTION RESPIRATORY (INHALATION)
Status: COMPLETED | OUTPATIENT
Start: 2021-08-20 | End: 2021-08-20

## 2021-08-20 RX ORDER — SODIUM CHLORIDE 0.9 % (FLUSH) 0.9 %
5-40 SYRINGE (ML) INJECTION EVERY 8 HOURS
Status: DISCONTINUED | OUTPATIENT
Start: 2021-08-20 | End: 2021-08-26 | Stop reason: HOSPADM

## 2021-08-20 RX ORDER — FAMOTIDINE 20 MG/1
20 TABLET, FILM COATED ORAL 2 TIMES DAILY
Status: DISCONTINUED | OUTPATIENT
Start: 2021-08-20 | End: 2021-08-26 | Stop reason: HOSPADM

## 2021-08-20 RX ORDER — DILTIAZEM HYDROCHLORIDE 30 MG/1
30 TABLET, FILM COATED ORAL DAILY
Status: DISCONTINUED | OUTPATIENT
Start: 2021-08-21 | End: 2021-08-26 | Stop reason: HOSPADM

## 2021-08-20 RX ADMIN — ACETAMINOPHEN 650 MG: 325 TABLET, FILM COATED ORAL at 23:39

## 2021-08-20 RX ADMIN — BUDESONIDE 500 MCG: 0.5 INHALANT RESPIRATORY (INHALATION) at 22:30

## 2021-08-20 RX ADMIN — KETOROLAC TROMETHAMINE 15 MG: 30 INJECTION, SOLUTION INTRAMUSCULAR; INTRAVENOUS at 16:44

## 2021-08-20 RX ADMIN — IPRATROPIUM BROMIDE AND ALBUTEROL SULFATE 3 ML: .5; 3 SOLUTION RESPIRATORY (INHALATION) at 21:13

## 2021-08-20 RX ADMIN — HEPARIN SODIUM 5000 UNITS: 5000 INJECTION INTRAVENOUS; SUBCUTANEOUS at 22:32

## 2021-08-20 RX ADMIN — Medication 10 ML: at 23:40

## 2021-08-20 RX ADMIN — VANCOMYCIN HYDROCHLORIDE 2000 MG: 10 INJECTION, POWDER, LYOPHILIZED, FOR SOLUTION INTRAVENOUS at 18:10

## 2021-08-20 RX ADMIN — FAMOTIDINE 20 MG: 20 TABLET, FILM COATED ORAL at 22:32

## 2021-08-20 RX ADMIN — ARFORMOTEROL TARTRATE 15 MCG: 15 SOLUTION RESPIRATORY (INHALATION) at 22:29

## 2021-08-20 RX ADMIN — CEFEPIME HYDROCHLORIDE 2 G: 2 INJECTION, POWDER, FOR SOLUTION INTRAVENOUS at 17:50

## 2021-08-20 RX ADMIN — IPRATROPIUM BROMIDE AND ALBUTEROL SULFATE 3 ML: .5; 3 SOLUTION RESPIRATORY (INHALATION) at 17:50

## 2021-08-20 NOTE — WOUND CARE
IP WOUND CONSULT    Ling Hogue  MEDICAL RECORD NUMBER:  [id number]  AGE: 64 y.o. GENDER: male  : 1945  TODAY'S DATE:  2021    GENERAL     [] Follow-up   [x] New Consult    Ling Hogue is a 64 y.o. male referred by:   [x] Physician  [] Nursing  [] Other:         PAST MEDICAL HISTORY    Past Medical History:   Diagnosis Date    Brain aneurysm     Cancer ([address]     prostate    COPD (chronic obstructive pulmonary disease) ([facility] [facility]     Hypertension     Nocturia     On home oxygen therapy     Pneumonia     Prostate neoplasm     Radiation effect         PAST SURGICAL HISTORY    Past Surgical History:   Procedure Laterality Date    HX HERNIA REPAIR      HX HIP ARTHROSCOPY  2021       FAMILY HISTORY    Family History   Problem Relation Age of Onset    Heart Disease Mother        SOCIAL HISTORY    Social History     Tobacco Use    Smoking status: Former Smoker     Types: Cigarettes    Smokeless tobacco: Never Used   Substance Use Topics    Alcohol use: No    Drug use: Never       ALLERGIES    Allergies   Allergen Reactions    Aspirin Other (comments)     \"messes my stomach up\"    Bactrim [Sulfamethoxazole-Trimethoprim] Unknown (comments)       MEDICATIONS    No current facility-administered medications on file prior to encounter. Current Outpatient Medications on File Prior to Encounter   Medication Sig Dispense Refill    collagenase (SANTYL) 250 unit/gram ointment Apply  to affected area daily. 30 g 1    arformoteroL (BROVANA) 15 mcg/2 mL nebu neb solution 2 mL by Nebulization route two (2) times a day. 30 Vial 0    budesonide (PULMICORT) 0.5 mg/2 mL nbsp 2 mL by Nebulization route two (2) times a day. 30 Each 0    enoxaparin (LOVENOX) 40 mg/0.4 mL 0.4 mL by SubCUTAneous route daily. 21 Syringe 0    famotidine (PEPCID) 20 mg tablet Take 1 Tab by mouth daily. 30 Tab 0    ferrous sulfate 325 mg (65 mg iron) tablet Take 1 Tab by mouth two (2) times daily (with meals).  30 Tab 0    amLODIPine (NORVASC) 5 mg tablet Take 1 Tab by mouth daily. 30 Tab 0    albuterol-ipratropium (DUO-NEB) 2.5 mg-0.5 mg/3 ml nebu 3 mL by Nebulization route every four (4) hours as needed for Wheezing. 30 Nebule 0    albuterol (PROVENTIL HFA, VENTOLIN HFA, PROAIR HFA) 90 mcg/actuation inhaler Take 2 Puffs by inhalation every four (4) hours as needed for Wheezing or Shortness of Breath. 1 Inhaler 1    OXYGEN-AIR DELIVERY SYSTEMS 2 L/min by Nasal route continuous.  furosemide (Lasix) 20 mg tablet Take 20 mg by mouth daily.  dilTIAZem (CARDIZEM) 30 mg tablet Take 1 Tab by mouth Before breakfast, lunch, and dinner. (Patient taking differently: Take 30 mg by mouth daily. Daily) 90 Tab 0    acetaminophen (TYLENOL) 325 mg tablet Take 650 mg by mouth every eight (8) hours as needed for Pain.  dextran 70/hypromellose (ARTIFICIAL TEARS, PF, OP) Apply 2 Drops to eye as needed for Other (both eyes for dryness).  tamsulosin (FLOMAX) 0.4 mg capsule Take 0.4 mg by mouth every evening.          Wt Readings from Last 3 Encounters:   08/20/21 95.3 kg (210 lb)   07/14/21 95.3 kg (210 lb)   04/22/21 106.6 kg (235 lb 1.6 oz)       [unfilled]  Visit Vitals  BP (!) 132/55   Pulse 70   Temp 98.2 °F (36.8 °C)   Resp 18   Wt 95.3 kg (210 lb)   SpO2 100%   BMI 31.93 kg/m²       ASSESSMENT     Skin impairment Identification:  Type: pressure    Contributing Factors: Leg brace left on patient and obvious signs of neglect and brace being left, should be removed daily for skin assessment and pressure relief     Wound Hip Left (Active)   Number of days: 86       Wound Ankle Left;Posterior (Active)   Wound Image   08/20/21 1350   Wound Etiology Pressure Stage 4 08/20/21 1350   Cleansed Wound cleanser 08/20/21 1350   Dressing/Treatment Xeroform 08/20/21 1350   Wound Length (cm) 3 cm 08/20/21 1350   Wound Width (cm) 3 cm 08/20/21 1350   Wound Depth (cm) 0.3 cm 08/20/21 1350   Wound Surface Area (cm^2) 9 cm^2 08/20/21 1350   Wound Volume (cm^3) 2.7 cm^3 08/20/21 1350   Wound Assessment Dry;Exposed Structure Tendon;Eschar dry 08/20/21 1350   Drainage Amount Scant 08/20/21 1350   Drainage Description Serous 08/20/21 1350   Wound Odor None 08/20/21 1350   Camryn-Wound/Incision Assessment Dry/flaky 08/20/21 1350   Edges Undefined edges 08/20/21 1350   Wound Thickness Description Full thickness 08/20/21 1350   Number of days: 0       Wound Leg lower Lateral (Active)   Wound Image   08/20/21 1350   Wound Etiology Pressure Stage 3 08/20/21 1350   Cleansed Wound cleanser 08/20/21 1350   Dressing/Treatment Xeroform 08/20/21 1350   Wound Length (cm) 1.5 cm 08/20/21 1350   Wound Width (cm) 1.5 cm 08/20/21 1350   Wound Depth (cm) 0.1 cm 08/20/21 1350   Wound Surface Area (cm^2) 2.25 cm^2 08/20/21 1350   Wound Volume (cm^3) 0.225 cm^3 08/20/21 1350   Wound Assessment Hyper granulation tissue 08/20/21 1350   Drainage Amount Small 08/20/21 1350   Drainage Description Serous 08/20/21 1350   Wound Odor None 08/20/21 1350   Camryn-Wound/Incision Assessment Dry/flaky 08/20/21 1350   Edges Unattached edges 08/20/21 1350   Wound Thickness Description Full thickness 08/20/21 1350   Number of days: 0       Wound Foot Left;Lateral (Active)   Wound Image   08/20/21 1350   Wound Etiology Pressure Unstageable 08/20/21 1350   Cleansed Wound cleanser 08/20/21 1350   Dressing/Treatment Xeroform 08/20/21 1350   Wound Length (cm) 0.9 cm 08/20/21 1350   Wound Width (cm) 2 cm 08/20/21 1350   Wound Surface Area (cm^2) 1.8 cm^2 08/20/21 1350   Wound Assessment Eschar dry;Slough 08/20/21 1350   Drainage Amount Small 08/20/21 1350   Drainage Description Serous 08/20/21 1350   Wound Odor None 08/20/21 1350   Camryn-Wound/Incision Assessment Dry/flaky 08/20/21 1350   Edges Undefined edges 08/20/21 1350   Wound Thickness Description Full thickness 08/20/21 1350   Number of days: 0       [REMOVED] Wound Leg upper Anterior;Left;Proximal large red skin tear (Removed)   Number of days: 74       [REMOVED] Incision 04/09/21 Hip Left (Removed)   Number of days: 75          PLAN     Skin Care & Pressure Relief Recommendations  Minimize layers of linen  Pads under patient to optimize support surface  Turn/reposition approximately every 2 hours  Manage incontinence     Physician/Provider notified: Yes  Recommendations: Pressure relieving strategies, follow up with podiatry     Teaching completed with:   [] Patient           [] Family member       [] Caregiver          [x] Nursing  [] Other    Patient/Caregiver Teaching:  Level of patient/caregiver understanding able to:   [x] Indicates understanding       [] Needs reinforcement  [] Unsuccessful      [] Verbal Understanding  [] Demonstrated understanding       [] No evidence of learning  [] Refused teaching         [] N/A       Electronically signed by Jamal Umana RN on 8/20/2021 at 1:57 PM

## 2021-08-20 NOTE — ED TRIAGE NOTES
Patient came from [facility] with reports that has a unhealed MRSA wound to left foot. Patient has broken femur and is at facility for rehab.

## 2021-08-20 NOTE — ED PROVIDER NOTES
EMERGENCY DEPARTMENT HISTORY AND PHYSICAL EXAM    Date: 8/20/2021  Patient Name: Lai Dillon    History of Presenting Illness     Chief Complaint   Patient presents with    Wound Check         History Provided By: Too MARLON FISHER is a 64 y.o. male who presents to the emergency department C/O left ankle/foot wound. Patient is currently at [address] for rehabilitation. He was recently seen at our facility for a fracture of his left femur. At that time he was admitted to the hospital and did not undergo surgical treatment and opted for conservative management. He has significant osteoporosis. He has history of hypertension, COPD on home O2, chronic kidney disease stage III, prostate cancer and chronic anemia. The patient has hard time hearing and is not a good historian. History is obtained from prior charting as well as the nursing staff facility at [facility]. They stated that the patient's wound grew out MRSA. States he was started on doxycycline on 8/15/2021 but stated that the wound appears to be worsening and is starting to drain purulence with an odor. Notes that he has a wound on the top part of his left foot and on the back part of his left ankle.         PCP: Keisha Grossman MD    Current Facility-Administered Medications   Medication Dose Route Frequency Provider Last Rate Last Admin    cefepime (MAXIPIME) 2 g in sterile water (preservative free) 10 mL IV syringe  2 g IntraVENous Q8H Dinesh Lamb  mL/hr at 08/20/21 1750 2 g at 08/20/21 1750    vancomycin (VANCOCIN) 2000 mg in  ml infusion  2,000 mg IntraVENous ONCE Dinesh Lamb  mL/hr at 08/20/21 1810 2,000 mg at 08/20/21 1810    Vancomycin Pharmacy to Dose  1 Each Other Rx Dosing/Monitoring Dinesh Wang DO        [START ON 8/21/2021] vancomycin (VANCOCIN) 750 mg in 0.9% sodium chloride 250 mL (VIAL-MATE)  750 mg IntraVENous Q12H Larry BURROWS DO Current Outpatient Medications   Medication Sig Dispense Refill    collagenase (SANTYL) 250 unit/gram ointment Apply  to affected area daily. 30 g 1    arformoteroL (BROVANA) 15 mcg/2 mL nebu neb solution 2 mL by Nebulization route two (2) times a day. 30 Vial 0    budesonide (PULMICORT) 0.5 mg/2 mL nbsp 2 mL by Nebulization route two (2) times a day. 30 Each 0    enoxaparin (LOVENOX) 40 mg/0.4 mL 0.4 mL by SubCUTAneous route daily. 21 Syringe 0    famotidine (PEPCID) 20 mg tablet Take 1 Tab by mouth daily. 30 Tab 0    ferrous sulfate 325 mg (65 mg iron) tablet Take 1 Tab by mouth two (2) times daily (with meals). 30 Tab 0    amLODIPine (NORVASC) 5 mg tablet Take 1 Tab by mouth daily. 30 Tab 0    albuterol-ipratropium (DUO-NEB) 2.5 mg-0.5 mg/3 ml nebu 3 mL by Nebulization route every four (4) hours as needed for Wheezing. 30 Nebule 0    albuterol (PROVENTIL HFA, VENTOLIN HFA, PROAIR HFA) 90 mcg/actuation inhaler Take 2 Puffs by inhalation every four (4) hours as needed for Wheezing or Shortness of Breath. 1 Inhaler 1    OXYGEN-AIR DELIVERY SYSTEMS 2 L/min by Nasal route continuous.  furosemide (Lasix) 20 mg tablet Take 20 mg by mouth daily.  dilTIAZem (CARDIZEM) 30 mg tablet Take 1 Tab by mouth Before breakfast, lunch, and dinner. (Patient taking differently: Take 30 mg by mouth daily. Daily) 90 Tab 0    acetaminophen (TYLENOL) 325 mg tablet Take 650 mg by mouth every eight (8) hours as needed for Pain.  dextran 70/hypromellose (ARTIFICIAL TEARS, PF, OP) Apply 2 Drops to eye as needed for Other (both eyes for dryness).  tamsulosin (FLOMAX) 0.4 mg capsule Take 0.4 mg by mouth every evening.          Past History     Past Medical History:  Past Medical History:   Diagnosis Date    Brain aneurysm     Cancer [facility])     prostate    COPD (chronic obstructive pulmonary disease) ([facility] [address]     Hypertension     Nocturia     On home oxygen therapy     Pneumonia     Prostate neoplasm     Radiation effect        Past Surgical History:  Past Surgical History:   Procedure Laterality Date    HX HERNIA REPAIR      HX HIP ARTHROSCOPY  04/09/2021       Family History:  Family History   Problem Relation Age of Onset    Heart Disease Mother        Social History:  Social History     Tobacco Use    Smoking status: Former Smoker     Types: Cigarettes    Smokeless tobacco: Never Used   Substance Use Topics    Alcohol use: No    Drug use: Never       Allergies: Allergies   Allergen Reactions    Aspirin Other (comments)     \"messes my stomach up\"    Bactrim [Sulfamethoxazole-Trimethoprim] Unknown (comments)         Review of Systems   Review of Systems   Constitutional: Negative for fever. Respiratory: Negative for shortness of breath. Cardiovascular: Negative for chest pain. Gastrointestinal: Negative for abdominal pain, nausea and vomiting. Genitourinary: Positive for flank pain. Skin: Positive for wound. All other systems reviewed and are negative. All other systems reviewed and are negative. Physical Exam     Vitals:    08/20/21 1749 08/20/21 1755 08/20/21 1800 08/20/21 1802   BP: (!) 132/51  (!) 122/51    Pulse: 77      Resp: 21      Temp:       SpO2: 95%  100%    Weight:    95.3 kg (210 lb)   Height:  5' 9\" (1.753 m)  5' 9\" (1.753 m)     Physical Exam    Nursing notes and vital signs reviewed    Constitutional: Hard of hearing, significantly chronically ill-appearing and severely debilitated.   Non toxic appearing, no acute distress, appearing stated age  Eyes: PERRL, EOMI, No conjunctival injection  ENT: external ears normal, No rhinorrhea, external nose normal, mucous membranes moist  Cardiovascular: Regular rate and rhythm, no murmurs, No JVD  Respiratory: Clear to ausculation bilaterally, No stridor, Normal work of breathing and chest excursion bilaterally  Abdomen: Soft, non tender, non distended, normoactive bowel sounds, No rigidity, no peritoneal signs  Musculoskeletal: No evidence of obvious injury to Head, Neck, Back, Extremities, no LE edema  Skin: Warm, dry, No obvious rashes there is significant dryness of his skin with chronic skin changes bilaterally. There is a wound on the dorsal aspect of his left foot measuring about 2 cm in diameter that appears clean with pink tissue. There is a second wound on the posterior aspect of the patient's ankle along his lower Achilles tendon. This wound is horrible appearing. It is about 4 cm x 2 cm in size with purulent drainage and significant malodorous purulent drainage. The Achilles tendon is completely exposed. Neuro: Awake oriented x 2, CN 2-12 intact, normal speech, patient has poor strength throughout all extremities and no movement in his left lower extremity, sensation full and symmetric bilaterally  Psychiatric: Normal mood and affect      Diagnostic Study Results     Labs -     Recent Results (from the past 72 hour(s))   NOVEL CORONAVIRUS (COVID-19)    Collection Time: 08/18/21  4:37 PM   Result Value Ref Range    SARS-CoV-2 Not Detected Not Detected     CBC WITH AUTOMATED DIFF    Collection Time: 08/19/21  7:15 AM   Result Value Ref Range    WBC 7.4 4.6 - 13.2 K/uL    RBC 3.12 (L) 4.35 - 5.65 M/uL    HGB 8.3 (L) 13.0 - 16.0 g/dL    HCT 27.1 (L) 36.0 - 48.0 %    MCV 86.9 74.0 - 97.0 FL    MCH 26.6 24.0 - 34.0 PG    MCHC 30.6 (L) 31.0 - 37.0 g/dL    RDW 14.2 11.6 - 14.5 %    PLATELET 547 933 - 042 K/uL    MPV 9.2 9.2 - 11.8 FL    NEUTROPHILS 70 40 - 73 %    LYMPHOCYTES 15 (L) 21 - 52 %    MONOCYTES 8 3 - 10 %    EOSINOPHILS 5 0 - 5 %    BASOPHILS 0 0 - 2 %    ABS. NEUTROPHILS 5.2 1.8 - 8.0 K/UL    ABS. LYMPHOCYTES 1.1 0.9 - 3.6 K/UL    ABS. MONOCYTES 0.6 0.05 - 1.2 K/UL    ABS. EOSINOPHILS 0.4 0.0 - 0.4 K/UL    ABS.  BASOPHILS 0.0 0.0 - 0.1 K/UL    DF AUTOMATED     MAGNESIUM    Collection Time: 08/19/21  7:15 AM   Result Value Ref Range    Magnesium 1.9 1.6 - 2.6 mg/dL   METABOLIC PANEL, COMPREHENSIVE    Collection Time: 08/19/21  7:15 AM   Result Value Ref Range    Sodium 140 136 - 145 mmol/L    Potassium 3.9 3.5 - 5.5 mmol/L    Chloride 108 100 - 111 mmol/L    CO2 28 21 - 32 mmol/L    Anion gap 4 3.0 - 18 mmol/L    Glucose 92 74 - 99 mg/dL    BUN 32 (H) 7.0 - 18 MG/DL    Creatinine 0.96 0.6 - 1.3 MG/DL    BUN/Creatinine ratio 33 (H) 12 - 20      GFR est AA >60 >60 ml/min/1.73m2    GFR est non-AA >60 >60 ml/min/1.73m2    Calcium 8.5 8.5 - 10.1 MG/DL    Bilirubin, total 0.2 0.2 - 1.0 MG/DL    ALT (SGPT) 11 (L) 16 - 61 U/L    AST (SGOT) 9 (L) 10 - 38 U/L    Alk. phosphatase 99 45 - 117 U/L    Protein, total 5.7 (L) 6.4 - 8.2 g/dL    Albumin 2.4 (L) 3.4 - 5.0 g/dL    Globulin 3.3 2.0 - 4.0 g/dL    A-G Ratio 0.7 (L) 0.8 - 1.7     CBC WITH AUTOMATED DIFF    Collection Time: 08/20/21  2:15 PM   Result Value Ref Range    WBC 10.8 4.6 - 13.2 K/uL    RBC 3.73 (L) 4.35 - 5.65 M/uL    HGB 10.1 (L) 13.0 - 16.0 g/dL    HCT 32.5 (L) 36.0 - 48.0 %    MCV 87.1 74.0 - 97.0 FL    MCH 27.1 24.0 - 34.0 PG    MCHC 31.1 31.0 - 37.0 g/dL    RDW 14.2 11.6 - 14.5 %    PLATELET 490 809 - 112 K/uL    MPV 9.0 (L) 9.2 - 11.8 FL    NEUTROPHILS 64 40 - 73 %    LYMPHOCYTES 10 (L) 21 - 52 %    MONOCYTES 6 3 - 10 %    EOSINOPHILS 19 (H) 0 - 5 %    BASOPHILS 1 0 - 2 %    ABS. NEUTROPHILS 6.9 1.8 - 8.0 K/UL    ABS. LYMPHOCYTES 1.1 0.9 - 3.6 K/UL    ABS. MONOCYTES 0.6 0.05 - 1.2 K/UL    ABS. EOSINOPHILS 2.1 (H) 0.0 - 0.4 K/UL    ABS.  BASOPHILS 0.1 0.0 - 0.1 K/UL    DF AUTOMATED      PLATELET COMMENTS ADEQUATE PLATELETS      RBC COMMENTS BASOPHILIC STIPPLING  SLIGHT       METABOLIC PANEL, BASIC    Collection Time: 08/20/21  2:15 PM   Result Value Ref Range    Sodium 139 136 - 145 mmol/L    Potassium 4.2 3.5 - 5.5 mmol/L    Chloride 104 100 - 111 mmol/L    CO2 31 21 - 32 mmol/L    Anion gap 4 3.0 - 18 mmol/L    Glucose 83 74 - 99 mg/dL    BUN 32 (H) 7.0 - 18 MG/DL    Creatinine 1.01 0.6 - 1.3 MG/DL    BUN/Creatinine ratio 32 (H) 12 - 20      GFR est AA >60 >60 ml/min/1.73m2    GFR est non-AA >60 >60 ml/min/1.73m2    Calcium 9.0 8.5 - 10.1 MG/DL   LACTIC ACID    Collection Time: 08/20/21  2:15 PM   Result Value Ref Range    Lactic acid 0.7 0.4 - 2.0 MMOL/L       Radiologic Studies -   XR TIB/FIB LT   Final Result      Possible lucency in the posterior distal tibia versus osteopenia with overlying   ulcer, cannot exclude osteomyelitis. Correlate with MRI. XR ANKLE LT MIN 3 V   Final Result      Possible lucency in the posterior distal tibia versus osteopenia with overlying   ulcer, cannot exclude osteomyelitis. Correlate with MRI. XR FOOT LT AP/LAT   Final Result      Possible lucency in the posterior distal tibia versus osteopenia with overlying   ulcer, cannot exclude osteomyelitis. Correlate with MRI. NM BONE SCAN LTD    (Results Pending)     CT Results  (Last 48 hours)    None        CXR Results  (Last 48 hours)    None          Medications given in the ED-  Medications   cefepime (MAXIPIME) 2 g in sterile water (preservative free) 10 mL IV syringe (2 g IntraVENous New Bag 8/20/21 1750)   vancomycin (VANCOCIN) 2000 mg in  ml infusion (2,000 mg IntraVENous New Bag 8/20/21 1810)   Vancomycin Pharmacy to Dose (has no administration in time range)   vancomycin (VANCOCIN) 750 mg in 0.9% sodium chloride 250 mL (VIAL-MATE) (has no administration in time range)   ketorolac (TORADOL) injection 15 mg (15 mg IntraVENous Given 8/20/21 1644)   albuterol-ipratropium (DUO-NEB) 2.5 MG-0.5 MG/3 ML (3 mL Nebulization Given 8/20/21 1750)         Medical Decision Making     I reviewed the vital signs, available nursing notes, past medical history, past surgical history, family history and social history. Vital Signs interpretation- I have reviewed the patient's vital signs.     Pulse Oximetry interpretation - 100% on 2 L NC    Cardiac Monitor interpretation:  Rate: 70 bpm  Rhythm: sinus    Records Reviewed: Nursing Notes, Old Medical Records, Previous Radiology Studies and Previous Laboratory Studies  The patient's prior left leg fracture was a vertical intercondylar fracture of the distal femur without separation of fracture fragments. They noted that the fracture extends approximately 13 cm in craniocaudal dimension from the level of the condyles to the distal medial femoral diaphysis. The left hip arthroplasty was in place without loosening or dislocation    Procedures:  Procedures    ED Course and MDM:  Wound culture was obtained. Will obtain blood work and cultures and lactic acid to evaluate for potential of sepsis. Laboratory findings unremarkable, x-rays of the foot ankle and tib-fib showed concerning signs for potential of osteomyelitis. MRI was recommended by radiology although upon MRI screening the patient had an aneurysm in his brain 19 years ago for which clips were placed in his brain. The patient is unsure what these metal clips are made of and at this point would be too much of a risk to try the MRI as it would likely kill him. CONSULT NOTE:   Dr. Hayden Bermudez spoke with Dr. Leyla Zheng   Specialty: podiatry  Discussed pt's hx, disposition, and available diagnostic and imaging results over the telephone. Reviewed care plans. He states that this point we could treat him for presumed osteomyelitis with IV antibiotics and obtain a bone scan. States he will evaluate the patient inpatient. Patient was started on vancomycin and cefepime for potential bone and joint infection. I discussed with the patient the results of his laboratory findings and imaging studies and recommended admission for continued IV antibiotics for his open wound and potential for osteomyelitis. He understands and is agreeable to plan to get a urine on him he said something about a urine I thought we run in a urine but I did not order a urine    Diagnosis and Disposition       Core Measures:  For Hospitalized Patients:    1.  Hospitalization Decision Time:  The decision to hospitalize the patient was made by Jesús Plata DO at 7:02 PM on 8/20/2021    2. Aspirin: Aspirin was not given because the patient did not present with a stroke at the time of their Emergency Department evaluation    7:01 PM  Patient is being admitted to the hospital by Dr. Yogesh Arceo. The results of their tests and reasons for their admission have been discussed with them and/or available family. They convey agreement and understanding for the need to be admitted and for their admission diagnosis. CONDITIONS ON ADMISSION:  Sepsis is not present at the time of admission. Deep Vein Thrombosis is not present at the time of admission. Thrombosis is not present at the time of admission. Urinary Tract Infection is not present at the time of admission. Pneumonia is not present at the time of admission. MRSA is present at the time of admission. Wound infection is present at the time of admission. Pressure Ulcer is present at the time of admission. CLINICAL IMPRESSION:    1. Other acute osteomyelitis of left tibia ([address]    2. Infected wound    3. Open wound of left ankle, initial encounter    4. Age-related physical debility    5. Chronic obstructive pulmonary disease, unspecified COPD type ([address]    6. Supplemental oxygen dependent    7. MRSA (methicillin resistant staph aureus) culture positive          Please note that this dictation was completed with [employer], the computer voice recognition software. Quite often unanticipated grammatical, syntax, homophones, and other interpretive errors are inadvertently transcribed by the computer software. Please disregard these errors. Please excuse any errors that have escaped final proofreading.

## 2021-08-20 NOTE — Clinical Note
Status[de-identified] INPATIENT [101]   Type of Bed: Medical [8]   Cardiac Monitoring Required?: No   Inpatient Hospitalization Certified Necessary for the Following Reasons: 3.  Patient receiving treatment that can only be provided in an inpatient setting (further clarification in H&P documentation)   Admitting Diagnosis: Osteomyelitis of left ankle [facility]) [[id number]]   Admitting Physician: Karri Delay [[id number]]   Attending Physician: Karri Sifuentes [[id number]]   Estimated Length of Stay: 5-7 Midnights   Discharge Plan[de-identified] Extended Care Facility (e.g. Adult Home, Nursing Home, etc.)

## 2021-08-20 NOTE — PROGRESS NOTES
Pharmacy Dosing Services: Vancomycin    Consult for Vancomycin Dosing by Pharmacy by Dr. Tushar Langley provided for this 64y.o. year old male , for indication of Bone and Joint Infection. Day of Therapy 1    Ht Readings from Last 1 Encounters:   08/20/21 175.3 cm (69\")        Wt Readings from Last 1 Encounters:   08/20/21 95.3 kg (210 lb)        Previous Regimen Vancomycin 2 g once   Other Current Antibiotics Cefepime   Serum Creatinine Lab Results   Component Value Date/Time    Creatinine 1.01 08/20/2021 02:15 PM    Creatinine, POC 1.3 10/30/2019 03:27 PM      Creatinine Clearance Estimated Creatinine Clearance: 68.6 mL/min (based on SCr of 1.01 mg/dL). BUN Lab Results   Component Value Date/Time    BUN 32 (H) 08/20/2021 02:15 PM    BUN, POC 30 (H) 10/30/2019 03:27 PM      WBC Lab Results   Component Value Date/Time    WBC 10.8 08/20/2021 02:15 PM      H/H Lab Results   Component Value Date/Time    HGB 10.1 (L) 08/20/2021 02:15 PM      Platelets Lab Results   Component Value Date/Time    PLATELET 794 66/95/2255 02:15 PM      Temp 98.2 °F (36.8 °C)     Start Vancomycin therapy, with loading dose of 2000 (mg) at 8/20/21(time/date). Follow with maintenance dose of 750(mg) at 8/21/21  @ 0600(time/date), every 12 hours (frequency). Dose calculated to approximate a therapeutic AUC of 447 mcg/ ml and a  trough at ss of 15.2 mcg/mL. Pharmacy to follow daily and will make changes to dose and/or frequency based on clinical status.       Pharmacist Aniyah Hollins, [facility]

## 2021-08-20 NOTE — PROGRESS NOTES
Post Acute Facility Update     *The information contained in this note was received during a weekly care coordination call with the post acute facility*    [facility] [place] ([facility])   on 7/15/21. Hospital Discharge Diagnosis per Dr. Beckwith Ax:    Fracture left Femur  Open wound Left Hip  Hip replacement Left Hip  Chronic Resp Failure with Hypoxia  CKD  COPD  HTN    Current Facility: [address] ([facility])  Anticipated Discharge Date: D    Facility Nursing Update: WBAT now but due to pain, pt will not put any weight on Left foot. * Therapy placed on hold as of 8/25 x 2 weeks - cast is causing some irritations and sores on legs - holding therapy to see if these areas heal.    Facility Social Work Update: lives with wife  Bundle Program Status: none  Upper Extremity Assistance: Stand by Assist - Presence and Cueing  Lower Extremity Assistance: Moderate Assistance   Bed Mobility: Stand by Assist - Presence and Cueing  Transfers: Minimal Assistance   Ambulation: Dependent  How far (in feet) is the patient ambulating?  None at this time  Device Used:    Barriers to Discharge: lives w/ wife, on home oxygen  Other:  none

## 2021-08-21 ENCOUNTER — APPOINTMENT (OUTPATIENT)
Dept: GENERAL RADIOLOGY | Age: 76
DRG: 464 | End: 2021-08-21
Attending: PODIATRIST
Payer: MEDICARE

## 2021-08-21 LAB
ANION GAP SERPL CALC-SCNC: 6 MMOL/L (ref 3–18)
BUN SERPL-MCNC: 34 MG/DL (ref 7–18)
BUN/CREAT SERPL: 33 (ref 12–20)
CALCIUM SERPL-MCNC: 8.6 MG/DL (ref 8.5–10.1)
CHLORIDE SERPL-SCNC: 107 MMOL/L (ref 100–111)
CO2 SERPL-SCNC: 27 MMOL/L (ref 21–32)
CREAT SERPL-MCNC: 1.02 MG/DL (ref 0.6–1.3)
ERYTHROCYTE [DISTWIDTH] IN BLOOD BY AUTOMATED COUNT: 14.3 % (ref 11.6–14.5)
GLUCOSE SERPL-MCNC: 108 MG/DL (ref 74–99)
HCT VFR BLD AUTO: 28.3 % (ref 36–48)
HGB BLD-MCNC: 8.8 G/DL (ref 13–16)
MCH RBC QN AUTO: 27.1 PG (ref 24–34)
MCHC RBC AUTO-ENTMCNC: 31.1 G/DL (ref 31–37)
MCV RBC AUTO: 87.1 FL (ref 74–97)
PLATELET # BLD AUTO: 278 K/UL (ref 135–420)
PMV BLD AUTO: 9 FL (ref 9.2–11.8)
POTASSIUM SERPL-SCNC: 3.9 MMOL/L (ref 3.5–5.5)
RBC # BLD AUTO: 3.25 M/UL (ref 4.35–5.65)
SODIUM SERPL-SCNC: 140 MMOL/L (ref 136–145)
WBC # BLD AUTO: 9.8 K/UL (ref 4.6–13.2)

## 2021-08-21 PROCEDURE — 85027 COMPLETE CBC AUTOMATED: CPT

## 2021-08-21 PROCEDURE — 73552 X-RAY EXAM OF FEMUR 2/>: CPT

## 2021-08-21 PROCEDURE — 80048 BASIC METABOLIC PNL TOTAL CA: CPT

## 2021-08-21 PROCEDURE — 94640 AIRWAY INHALATION TREATMENT: CPT

## 2021-08-21 PROCEDURE — 65270000029 HC RM PRIVATE

## 2021-08-21 PROCEDURE — 74011000250 HC RX REV CODE- 250: Performed by: FAMILY MEDICINE

## 2021-08-21 PROCEDURE — 77010033678 HC OXYGEN DAILY

## 2021-08-21 PROCEDURE — 74011250637 HC RX REV CODE- 250/637: Performed by: FAMILY MEDICINE

## 2021-08-21 PROCEDURE — 94760 N-INVAS EAR/PLS OXIMETRY 1: CPT

## 2021-08-21 PROCEDURE — 74011250636 HC RX REV CODE- 250/636: Performed by: FAMILY MEDICINE

## 2021-08-21 PROCEDURE — 36415 COLL VENOUS BLD VENIPUNCTURE: CPT

## 2021-08-21 RX ORDER — ARFORMOTEROL TARTRATE 15 UG/2ML
15 SOLUTION RESPIRATORY (INHALATION)
Status: DISCONTINUED | OUTPATIENT
Start: 2021-08-21 | End: 2021-08-26 | Stop reason: HOSPADM

## 2021-08-21 RX ORDER — BUDESONIDE 0.5 MG/2ML
500 INHALANT ORAL
Status: DISCONTINUED | OUTPATIENT
Start: 2021-08-21 | End: 2021-08-26 | Stop reason: HOSPADM

## 2021-08-21 RX ADMIN — IPRATROPIUM BROMIDE AND ALBUTEROL SULFATE 3 ML: .5; 3 SOLUTION RESPIRATORY (INHALATION) at 15:41

## 2021-08-21 RX ADMIN — FUROSEMIDE 20 MG: 20 TABLET ORAL at 10:12

## 2021-08-21 RX ADMIN — BUDESONIDE 500 MCG: 0.5 INHALANT RESPIRATORY (INHALATION) at 20:09

## 2021-08-21 RX ADMIN — FAMOTIDINE 20 MG: 20 TABLET, FILM COATED ORAL at 20:49

## 2021-08-21 RX ADMIN — HEPARIN SODIUM 5000 UNITS: 5000 INJECTION INTRAVENOUS; SUBCUTANEOUS at 20:54

## 2021-08-21 RX ADMIN — ARFORMOTEROL TARTRATE 15 MCG: 15 SOLUTION RESPIRATORY (INHALATION) at 20:09

## 2021-08-21 RX ADMIN — IPRATROPIUM BROMIDE AND ALBUTEROL SULFATE 3 ML: .5; 3 SOLUTION RESPIRATORY (INHALATION) at 00:33

## 2021-08-21 RX ADMIN — VANCOMYCIN HYDROCHLORIDE 750 MG: 750 INJECTION, POWDER, LYOPHILIZED, FOR SOLUTION INTRAVENOUS at 05:28

## 2021-08-21 RX ADMIN — Medication 10 ML: at 05:32

## 2021-08-21 RX ADMIN — IPRATROPIUM BROMIDE AND ALBUTEROL SULFATE 3 ML: .5; 3 SOLUTION RESPIRATORY (INHALATION) at 04:31

## 2021-08-21 RX ADMIN — HEPARIN SODIUM 5000 UNITS: 5000 INJECTION INTRAVENOUS; SUBCUTANEOUS at 11:39

## 2021-08-21 RX ADMIN — IPRATROPIUM BROMIDE AND ALBUTEROL SULFATE 3 ML: .5; 3 SOLUTION RESPIRATORY (INHALATION) at 12:29

## 2021-08-21 RX ADMIN — IPRATROPIUM BROMIDE AND ALBUTEROL SULFATE 3 ML: .5; 3 SOLUTION RESPIRATORY (INHALATION) at 07:47

## 2021-08-21 RX ADMIN — TAMSULOSIN HYDROCHLORIDE 0.4 MG: 0.4 CAPSULE ORAL at 20:51

## 2021-08-21 RX ADMIN — Medication 10 ML: at 22:00

## 2021-08-21 RX ADMIN — IPRATROPIUM BROMIDE AND ALBUTEROL SULFATE 3 ML: .5; 3 SOLUTION RESPIRATORY (INHALATION) at 20:09

## 2021-08-21 RX ADMIN — CEFEPIME HYDROCHLORIDE 2 G: 2 INJECTION, POWDER, FOR SOLUTION INTRAVENOUS at 01:39

## 2021-08-21 RX ADMIN — FERROUS SULFATE TAB 325 MG (65 MG ELEMENTAL FE) 325 MG: 325 (65 FE) TAB at 16:49

## 2021-08-21 RX ADMIN — CEFEPIME HYDROCHLORIDE 2 G: 2 INJECTION, POWDER, FOR SOLUTION INTRAVENOUS at 20:51

## 2021-08-21 RX ADMIN — BUDESONIDE 500 MCG: 0.5 INHALANT RESPIRATORY (INHALATION) at 07:47

## 2021-08-21 RX ADMIN — DILTIAZEM HYDROCHLORIDE 30 MG: 30 TABLET, FILM COATED ORAL at 10:09

## 2021-08-21 RX ADMIN — ARFORMOTEROL TARTRATE 15 MCG: 15 SOLUTION RESPIRATORY (INHALATION) at 07:47

## 2021-08-21 RX ADMIN — VANCOMYCIN HYDROCHLORIDE 750 MG: 750 INJECTION, POWDER, LYOPHILIZED, FOR SOLUTION INTRAVENOUS at 20:53

## 2021-08-21 RX ADMIN — CEFEPIME HYDROCHLORIDE 2 G: 2 INJECTION, POWDER, FOR SOLUTION INTRAVENOUS at 11:38

## 2021-08-21 NOTE — PROGRESS NOTES
2100- Pt admitted from ed, pt assessed, database completed. Pt instructed on bed and call light. Bed alarm activated. Skin assessment done with Nico Lozano RN, pt with scratch on right hip. Left hip old incision , swollen +2. Left foot drsg c/d/i, sacrum red no breakdown  0453- Dr. Caro Roman in to see pt, to hold Heparin at 0600, Pt to be NPO at this time until seen by Dr. Rosie Low. Pt has only had few sips of water since arrival to floor .

## 2021-08-21 NOTE — H&P
History & Physical    Patient: Alva Quintana MRN: [id number]  [facility]: [id number]    YOB: 1945  Age: 64 y.o. Sex: male      DOA: 8/20/2021  Primary Care Provider:  Lisset Mascorro MD    Assessment/Plan   Alva Quintana is a 64 y.o. male with history of multiple medical problems including COPD with oxygen requirement, chronic respiratory failure with hypoxia requiring oxygen, paroxysmal atrial fibrillation, hypertension, chronic kidney disease stage III, history of prostate cancer and osteoporosis admitted for infected left ankle wound that is MRSA+ and is likely has bone involvement c/w osteomyelitis. Infected left ankle wound -  Admit to Medr floor  Continue IV vancomycin and cefepime  Dr. Naheed Gerber consulted, appreciate podiatry expertise, will see patient in a.m. and   determine if surgical debridement needed  Bone scan ordered  Wound care consult  Brace removed  No signs of sepsis, lactic acid normal, even white blood count normal  Blood cultures and wound cultures obtained today, will follow    COPD with chronic respiratory failure with hypoxia requiring oxygen -  Resume home respiratory medications include DuoNebs every 4 hours  Oxygen supplementation with, requires 2 L at home, to keep at 92%    Paroxysmal atrial fibrillation -  On subcutaneous Lovenox at home for DVT prophylaxis  Ordered heparin for DVT prophylaxis considering the patient is likely to go to the OR in a.m.   Resume Cardizem    Hypertension-  Blood pressure controlled  Resume home medications    Chronic kidney disease stage III -  Creatinine stable and within normal limits at 1.01    DVT prophylaxis: Heparin  GI prophylaxis: Pepcid    Patient Active Problem List   Diagnosis Code    Hypertension I10    Chronic obstructive pulmonary disease (HCC) J44.9    Chronic renal disease, stage 3, moderately decreased glomerular filtration rate between 30-59 mL/min/1.73 square meter (HCC) N18.30    Age-related physical debility R54  Chronic respiratory failure with hypoxia ([facility]) J96.11    Tobacco dependence F17.200    Left hip pain M25.552    PAF (paroxysmal atrial fibrillation) ([facility]) I48.0    Avascular necrosis of bone of left hip ([facility]) M87.052    Obesity E66.9    Acute pulmonary edema ([facility]) J81.0    Anemia D64.9    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 02XA    Scrotal edema N50.89    Open wound of left hip S71.002A    Intercondylar fracture of femur ([facility]) S72.413A    Left leg pain M79.605    Open wound of left ankle S91.002A    Infected wound T14. 8XXA, L08.9    Supplemental oxygen dependent Z99.81    MRSA (methicillin resistant staph aureus) culture positive Z22.322    Pyogenic inflammation of bone ([facility]) M86.9    Osteomyelitis of left ankle ([facility]) M86.9     Estimated length of stay: 2-3 days    CC: infected left ankle wound       HPI:     Jelly Ponce is a 64 y.o. male with history of multiple medical problems including COPD with oxygen requirement, chronic respiratory failure with hypoxia requiring oxygen, paroxysmal atrial fibrillation, hypertension, chronic kidney disease stage III, history of prostate cancer and osteoporosis who presents to the emergency department C/O left ankle/foot wound. Patient is currently at [address] for rehabilitation. He was recently seen at our facility for a fracture of his left femur. At that time he was admitted to the hospital and did not undergo surgical treatment and opted for conservative management. Patient is a poor historian and hard of hearing so most of his history is obtained from chart review and discussion with the ER physician who also spoke to staff at [place] [address] facility. Apparently, when patient opted for medical management only and not surgical management he was given a brace and is thought that the brace may have caused a worsening wound on his left ankle.   Back to [facility]. Patsy Velasquez was apparently concerned that the wound was very close to his Achilles tendon. They cultured the wound and it grew out MRSA so they started him on doxycycline which he has been doing for the last 4 to 5 days. However they decided to send him over to the ED today because he became concerned that his wound was continuing to worsen and that purulent material is copiously oozing from the wound. Emergency room physician relayed that the wound was quite concerning with significant amount of odor and drainage and Achilles tendons actually visible because of the deep involvement of the wound. X-rays were taken which demonstrated likely osteomyelitis. Did try to obtain MRI but there was some history the patient had aneurysm with coil and so MRI was advised not to be safe. ER physician spoke to podiatrist, Dr. Nanette Osei who recommended a bone scan which has been ordered and advised that patient be treated like he has osteomyelitis and Dr. Nanette Osei will see him tomorrow.    Past Medical History:   Diagnosis Date    Brain aneurysm     Cancer [facility])     prostate    COPD (chronic obstructive pulmonary disease) (HCC)     Hypertension     Nocturia     On home oxygen therapy     Pneumonia     Prostate neoplasm     Radiation effect        Past Surgical History:   Procedure Laterality Date    HX HERNIA REPAIR      HX HIP ARTHROSCOPY  04/09/2021       Family History   Problem Relation Age of Onset    Heart Disease Mother        Social History     Socioeconomic History    Marital status:      Spouse name: Not on file    Number of children: Not on file    Years of education: Not on file    Highest education level: Not on file   Tobacco Use    Smoking status: Former Smoker     Types: Cigarettes    Smokeless tobacco: Never Used   Substance and Sexual Activity    Alcohol use: No    Drug use: Never     Social Determinants of Health     Financial Resource Strain:     Difficulty of Paying Living Expenses: Food Insecurity:     Worried About Running Out of Food in the Last Year:     [address] in the Last Year:    Transportation Needs:     Lack of Transportation (Medical):  Lack of Transportation (Non-Medical):    Physical Activity:     Days of Exercise per Week:     Minutes of Exercise per Session:    Stress:     Feeling of Stress :    Social Connections:     Frequency of Communication with Friends and Family:     Frequency of Social Gatherings with Friends and Family:     Attends Sikh Services:     Active Member of Clubs or Organizations:     Attends Club or Organization Meetings:     Marital Status:        Prior to Admission medications    Medication Sig Start Date End Date Taking? Authorizing Provider   collagenase (SANTYL) 250 unit/gram ointment Apply  to affected area daily. 6/23/21   Renee Howell MD   arformoteroL (BROVANA) 15 mcg/2 mL nebu neb solution 2 mL by Nebulization route two (2) times a day. 4/14/21   Mariusz Childress MD   budesonide (PULMICORT) 0.5 mg/2 mL nbsp 2 mL by Nebulization route two (2) times a day. 4/14/21   Mariusz Childress MD   enoxaparin (LOVENOX) 40 mg/0.4 mL 0.4 mL by SubCUTAneous route daily. 4/14/21   Mariusz Childress MD   famotidine (PEPCID) 20 mg tablet Take 1 Tab by mouth daily. 4/14/21   Mariusz Childress MD   ferrous sulfate 325 mg (65 mg iron) tablet Take 1 Tab by mouth two (2) times daily (with meals). 4/14/21   Mariusz Childress MD   amLODIPine (NORVASC) 5 mg tablet Take 1 Tab by mouth daily. 3/3/21   Wesley Zapata MD   albuterol-ipratropium (DUO-NEB) 2.5 mg-0.5 mg/3 ml nebu 3 mL by Nebulization route every four (4) hours as needed for Wheezing. 2/7/21   Smita Jordan MD   albuterol (PROVENTIL HFA, VENTOLIN HFA, PROAIR HFA) 90 mcg/actuation inhaler Take 2 Puffs by inhalation every four (4) hours as needed for Wheezing or Shortness of Breath. 2/7/21   Smita Jordan MD   OXYGEN-AIR DELIVERY SYSTEMS 2 L/min by Nasal route continuous.     Provider, Historical furosemide (Lasix) 20 mg tablet Take 20 mg by mouth daily. Provider, Historical   dilTIAZem (CARDIZEM) 30 mg tablet Take 1 Tab by mouth Before breakfast, lunch, and dinner. Patient taking differently: Take 30 mg by mouth daily. Daily 20   Mendoza Hanson MD   acetaminophen (TYLENOL) 325 mg tablet Take 650 mg by mouth every eight (8) hours as needed for Pain. Provider, Historical   dextran 70/hypromellose (ARTIFICIAL TEARS, PF, OP) Apply 2 Drops to eye as needed for Other (both eyes for dryness). Provider, Historical   tamsulosin (FLOMAX) 0.4 mg capsule Take 0.4 mg by mouth every evening. Other, MD Blayne       Allergies   Allergen Reactions    Aspirin Other (comments)     \"messes my stomach up\"    Bactrim [Sulfamethoxazole-Trimethoprim] Unknown (comments)       Review of Systems  Gen: No fever, chills, malaise, weight loss/gain. Heent: No headache, rhinorrhea, epistaxis, ear pain, hearing loss, sinus pain, neck pain/stiffness, sore throat. Heart: No chest pain, palpitations, KUMAR, pnd, or orthopnea. Resp: No cough, hemoptysis, wheezing and shortness of breath. GI: No nausea, vomiting, diarrhea, constipation, melena or hematochezia. : No urinary obstruction, dysuria or hematuria. Derm: No rash, new skin lesion or pruritis. Musc/skeletal: no bone or joint complains. Vasc: No edema, cyanosis or claudication. Endo: No heat/cold intolerance, no polyuria,polydipsia or polyphagia. Neuro: No unilateral weakness, numbness, tingling. No seizures. Heme: No easy bruising or bleeding. Physical Exam:     Physical Exam:  Visit Vitals  BP (!) 132/56 (BP 1 Location: Left upper arm, BP Patient Position: Sitting; At rest)   Pulse 84   Temp 97.9 °F (36.6 °C)   Resp 20   Ht 5' 9\" (1.753 m)   Wt 95.3 kg (210 lb)   SpO2 99%   BMI 31.01 kg/m²    O2 Flow Rate (L/min): 2 l/min O2 Device: Nasal cannula    Temp (24hrs), Av.1 °F (36.7 °C), Min:97.9 °F (36.6 °C), Max:98.2 °F (36.8 °C)    No intake/output data recorded. No intake/output data recorded. General:   Significantly hard of hearing, chronically ill-appearing, debilitated  man, nontoxic, no acute distress   Head:  Normocephalic, without obvious abnormality, atraumatic. Eyes:  Conjunctivae/corneas clear, sclera anicteric, PERRL, EOMs intact. Nose: Nares normal. No drainage or sinus tenderness. Throat: Lips, mucosa, and tongue normal.    Neck: Supple, symmetrical, trachea midline, no adenopathy. Lungs:   Clear to auscultation bilaterally. Heart:  Regular rate and rhythm, S1, S2 normal, no murmur, click, rub or gallop. Abdomen: Soft, non-tender. Bowel sounds normal. No masses,  No organomegaly. Extremities: Extremities normal, atraumatic, no cyanosis or edema. Capillary refill normal.   Pulses: 2+ and symmetric all extremities. Skin: wound on the dorsal aspect of his left foot measuring about 2 cm in diameter that does not appear infected or inflamed, second wound on the posterior aspect of ankle approximate to his lower Achilles tendon, about 4 cm x 2 cm in size of purulent drainage, appears inflamed and infected, Achilles tendon visible   Neurologic: CNII-XII intact. No focal motor or sensory deficit. Labs Reviewed:  Recent Results (from the past 24 hour(s))   CBC WITH AUTOMATED DIFF    Collection Time: 08/20/21  2:15 PM   Result Value Ref Range    WBC 10.8 4.6 - 13.2 K/uL    RBC 3.73 (L) 4.35 - 5.65 M/uL    HGB 10.1 (L) 13.0 - 16.0 g/dL    HCT 32.5 (L) 36.0 - 48.0 %    MCV 87.1 74.0 - 97.0 FL    MCH 27.1 24.0 - 34.0 PG    MCHC 31.1 31.0 - 37.0 g/dL    RDW 14.2 11.6 - 14.5 %    PLATELET 924 077 - 501 K/uL    MPV 9.0 (L) 9.2 - 11.8 FL    NEUTROPHILS 64 40 - 73 %    LYMPHOCYTES 10 (L) 21 - 52 %    MONOCYTES 6 3 - 10 %    EOSINOPHILS 19 (H) 0 - 5 %    BASOPHILS 1 0 - 2 %    ABS. NEUTROPHILS 6.9 1.8 - 8.0 K/UL    ABS. LYMPHOCYTES 1.1 0.9 - 3.6 K/UL    ABS. MONOCYTES 0.6 0.05 - 1.2 K/UL    ABS. EOSINOPHILS 2.1 (H) 0.0 - 0.4 K/UL    ABS.  BASOPHILS 0.1 0.0 - 0.1 K/UL    DF AUTOMATED      PLATELET COMMENTS ADEQUATE PLATELETS      RBC COMMENTS BASOPHILIC STIPPLING  SLIGHT       METABOLIC PANEL, BASIC    Collection Time: 08/20/21  2:15 PM   Result Value Ref Range    Sodium 139 136 - 145 mmol/L    Potassium 4.2 3.5 - 5.5 mmol/L    Chloride 104 100 - 111 mmol/L    CO2 31 21 - 32 mmol/L    Anion gap 4 3.0 - 18 mmol/L    Glucose 83 74 - 99 mg/dL    BUN 32 (H) 7.0 - 18 MG/DL    Creatinine 1.01 0.6 - 1.3 MG/DL    BUN/Creatinine ratio 32 (H) 12 - 20      GFR est AA >60 >60 ml/min/1.73m2    GFR est non-AA >60 >60 ml/min/1.73m2    Calcium 9.0 8.5 - 10.1 MG/DL   LACTIC ACID    Collection Time: 08/20/21  2:15 PM   Result Value Ref Range    Lactic acid 0.7 0.4 - 2.0 MMOL/L   URINALYSIS W/ RFLX MICROSCOPIC    Collection Time: 08/20/21  2:15 PM   Result Value Ref Range    Color YELLOW      Appearance CLEAR      Specific gravity 1.010 1.005 - 1.030      pH (UA) 6.5 5.0 - 8.0      Protein Negative NEG mg/dL    Glucose Negative NEG mg/dL    Ketone Negative NEG mg/dL    Bilirubin Negative NEG      Blood Negative NEG      Urobilinogen 0.2 0.2 - 1.0 EU/dL    Nitrites Negative NEG      Leukocyte Esterase Negative NEG         Procedures/imaging: see electronic medical records for all procedures/Xrays and details which were not copied into this note but were reviewed prior to creation of Plan        CC: Lisset Mascorro MD

## 2021-08-21 NOTE — ED NOTES
Assumed care of patient. Report completed with Kishan Cox RN. Plan of care discussed, patient waiting inpatient bed assignment.

## 2021-08-21 NOTE — PROGRESS NOTES
1807  Bedside shift change report given to Rcio Babb RN (oncoming nurse) by Melody TUCKER (offgoing nurse). Report included the following information SBAR, Kardex, Intake/Output and MAR.     1081  Verbal order with readback Dr. Kera Gould: Regular diet and give heparin. Surgery likely Monday. 1000  Patient A&Ox4. Dressing to L foot CDI. Denies pain or SOB. Lungs coarse, O2 2 L Nasal canula. 9339  Verbal order with readback Dr Jacky Juarez: Xray L femur stat, bone scan stat. 2045  Bedside and verbal shift change report given to [address] by Telma Peters RN. Report included SBAR, kardex, MAR, and recent results.

## 2021-08-21 NOTE — PROGRESS NOTES
Pt complains of right sided flank pain. Pt stated \"this is not new, I had it in ED and they gave me a shot and it worked until now\". . medications given per mar was Toradol  Dr. Chandu Underwood paged.  Awaiting orders

## 2021-08-21 NOTE — CONSULTS
Podiatry Consult    Subjective:         Date of Consultation: August 21, 2021     Referring Physician: Dr. Mya Ovalle    Patient is a 64 y.o.  male who is being seen for painful infected open ulcers of the left ankle. He states that he fell out of his chair and fractured his left femur on July 12. He was treated by Dr. Isaac Skinner and decided against surgery. He has worn a leg brace that subsequently rubbed on the back of his left ankle causing this new ulcer. He has been at [facility] for rehabilitation where they took wound cultures and put him on doxycycline for the last 4 to 5 days. Unfortunately the wound worsened and he was sent to the ED for admission.     Patient Active Problem List    Diagnosis Date Noted    Open wound of left ankle 08/20/2021    Infected wound 08/20/2021    Supplemental oxygen dependent 08/20/2021    MRSA (methicillin resistant staph aureus) culture positive 08/20/2021    Pyogenic inflammation of bone ([address] 08/20/2021    Osteomyelitis of left ankle ([address] 08/20/2021    Intercondylar fracture of femur ([address] 07/12/2021    Left leg pain 07/12/2021    Open wound of left hip 05/26/2021    Scrotal edema 04/23/2021    Hematoma of left hip 04/19/2021    Acute hip pain 04/18/2021    Status post total replacement of left hip 04/14/2021    Anemia 04/11/2021    Obesity 04/10/2021    Acute pulmonary edema (HCC) 04/10/2021    Avascular necrosis of bone of left hip ([address] 04/08/2021    Left hip pain 04/05/2021    PAF (paroxysmal atrial fibrillation) ([address] 04/05/2021    Tobacco dependence 02/21/2020    Chronic respiratory failure with hypoxia ([address] 08/19/2019    Age-related physical debility 07/08/2019    Chronic renal disease, stage 3, moderately decreased glomerular filtration rate between 30-59 mL/min/1.73 square meter ([address] 07/20/2018    Chronic obstructive pulmonary disease ([address] 04/20/2018    Hypertension      Past Medical History:   Diagnosis Date    Brain aneurysm  Cancer ([address]     prostate    COPD (chronic obstructive pulmonary disease) ([facility]     Hypertension     Nocturia     On home oxygen therapy     Pneumonia     Prostate neoplasm     Radiation effect       Past Surgical History:   Procedure Laterality Date    HX HERNIA REPAIR      HX HIP ARTHROSCOPY  04/09/2021      Family History   Problem Relation Age of Onset    Heart Disease Mother       Social History     Tobacco Use    Smoking status: Former Smoker     Types: Cigarettes    Smokeless tobacco: Never Used   Substance Use Topics    Alcohol use: No     Current Facility-Administered Medications   Medication Dose Route Frequency Provider Last Rate Last Admin    budesonide (PULMICORT) 500 mcg/2 ml nebulizer suspension  500 mcg Nebulization BID RT Berhane Zapata MD   500 mcg at 08/21/21 0747    arformoteroL (BROVANA) neb solution 15 mcg  15 mcg Nebulization BID RT Berhane Zapata MD   15 mcg at 08/21/21 0747    cefepime (MAXIPIME) 2 g in sterile water (preservative free) 10 mL IV syringe  2 g IntraVENous Q8H Brandan Zapata  mL/hr at 08/21/21 1138 2 g at 08/21/21 1138    Vancomycin Pharmacy to Dose  1 Each Other Rx Dosing/Monitoring Brandan Zapata MD        vancomycin (VANCOCIN) 750 mg in 0.9% sodium chloride 250 mL (VIAL-MATE)  750 mg IntraVENous Q12H Brandan Zapata  mL/hr at 08/21/21 0528 750 mg at 08/21/21 0528    sodium chloride (NS) flush 5-40 mL  5-40 mL IntraVENous Q8H Brandan Zapata MD   10 mL at 08/21/21 0532    sodium chloride (NS) flush 5-40 mL  5-40 mL IntraVENous PRN Brandan Zapata MD        acetaminophen (TYLENOL) tablet 650 mg  650 mg Oral Q6H PRN Brandan Zapata MD   650 mg at 08/20/21 2339    Or    acetaminophen (TYLENOL) suppository 650 mg  650 mg Rectal Q6H PRN Brandan Zapata MD        polyethylene glycol (MIRALAX) packet 17 g  17 g Oral DAILY PRN Brandan Zapata MD        ondansetron (ZOFRAN ODT) tablet 4 mg  4 mg Oral Q8H PRN Rahel Zapata MD        Or    ondansetron (ZOFRAN) injection 4 mg  4 mg IntraVENous Q6H PRN Rahel Zapata MD        famotidine (PEPCID) tablet 20 mg  20 mg Oral BID Rahel Zapata MD   20 mg at 21 2232    heparin (porcine) injection 5,000 Units  5,000 Units SubCUTAneous Q8H Rahel Zapata MD   5,000 Units at 21 1139    alum-mag hydroxide-simeth (MYLANTA) oral suspension 15 mL  15 mL Oral Q6H PRN Berhane Zapata MD        amLODIPine (NORVASC) tablet 5 mg  5 mg Oral DAILY Rahel Zapata MD        polyvinyl alcohol (LIQUIFILM TEARS) 1.4 % ophthalmic solution 2 Drop  2 Drop Both Eyes PRN Berhane Zapata MD        dilTIAZem IR (CARDIZEM) tablet 30 mg  30 mg Oral DAILY Berhane Zapata MD   30 mg at 21 1009    ferrous sulfate tablet 325 mg  325 mg Oral BID WITH MEALS Rahel Zapata MD   325 mg at 21 1649    furosemide (LASIX) tablet 20 mg  20 mg Oral DAILY Berhane Zapata MD   20 mg at 21 1012    tamsulosin (FLOMAX) capsule 0.4 mg  0.4 mg Oral QPM Berhane Zapata MD        albuterol-ipratropium (DUO-NEB) 2.5 MG-0.5 MG/3 ML  3 mL Nebulization Q4H Berhane Zapata MD   3 mL at 21 1541      Allergies   Allergen Reactions    Aspirin Other (comments)     \"messes my stomach up\"    Bactrim [Sulfamethoxazole-Trimethoprim] Unknown (comments)        Review of Systems:  A comprehensive review of systems was negative except for that written in the History of Present Illness.     Objective:     Patient Vitals for the past 8 hrs:   BP Temp Pulse Resp SpO2   21 1844 (!) 107/55 98.4 °F (36.9 °C) 80 18 98 %   21 1656 (!) 125/55 97.9 °F (36.6 °C) 85 18 100 %   21 1542 -- -- -- -- 98 %   21 1231 -- -- -- -- 98 %   21 1155 (!) 118/56 98.6 °F (37 °C) 81 18 100 %     Temp (24hrs), Av.9 °F (36.6 °C), Min:97.5 °F (36.4 °C), Max:98.6 °F (37 °C)      Physical Exam:        Vascular:  Dorsalis pedis pulses are 1/4 bilateral.  Posterior tibial pulses are 1/4 bilateral.  Capillary fill time is less than 3 seconds, bilateral.  Edema is observed bilateral lower extremities. Varicosities are not observed bilateral lower extremities. Derm:  Skin temperature of lower extremities warm to cool proximal to distal bilateral.  Inspection of skin shows open decubitus ulcer left posterior ankle 3 x 2 x 0.3 cm with exposed Achilles tendon. Partial yellow fibrotic base with hyperkeratotic skin edges. Positive purulent drainage. Positive malodor. Positive pain on palpation. Open ulcer left medial ankle measuring 1 x 1.5 x 0.2 cm with tan fibrotic base deep to the subcutaneous tissue. Positive serosanguineous drainage, positive surrounding erythema, positive pain on palpation. Ortho:  Muscle strength 3/5 for all groups tested. Muscle tone normal. Inspection/palpation of bones - joints- muscle shows - within normal limits right lower extremity. Left lower extremity was not tested secondary to recent femur fracture. Neuro:  Light touch, sharp/dull, vibratory, and proprioception sensations all intact bilateral lower extremities. Deep tendon reflexes intact right lower extremity.       Lab Review:   Recent Results (from the past 24 hour(s))   METABOLIC PANEL, BASIC    Collection Time: 08/21/21  5:48 AM   Result Value Ref Range    Sodium 140 136 - 145 mmol/L    Potassium 3.9 3.5 - 5.5 mmol/L    Chloride 107 100 - 111 mmol/L    CO2 27 21 - 32 mmol/L    Anion gap 6 3.0 - 18 mmol/L    Glucose 108 (H) 74 - 99 mg/dL    BUN 34 (H) 7.0 - 18 MG/DL    Creatinine 1.02 0.6 - 1.3 MG/DL    BUN/Creatinine ratio 33 (H) 12 - 20      GFR est AA >60 >60 ml/min/1.73m2    GFR est non-AA >60 >60 ml/min/1.73m2    Calcium 8.6 8.5 - 10.1 MG/DL   CBC W/O DIFF    Collection Time: 08/21/21  5:48 AM   Result Value Ref Range    WBC 9.8 4.6 - 13.2 K/uL    RBC 3.25 (L) 4.35 - 5.65 M/uL    HGB 8.8 (L) 13.0 - 16.0 g/dL    HCT 28.3 (L) 36.0 - 48.0 %    MCV 87.1 74.0 - 97.0 FL    MCH 27.1 24.0 - 34.0 PG    MCHC 31.1 31.0 - 37.0 g/dL    RDW 14.3 11.6 - 14.5 %    PLATELET 657 191 - 911 K/uL    MPV 9.0 (L) 9.2 - 11.8 FL       Impression:   Status post left femur fracture 5 weeks with nonsurgical treatment. Dr. Sai Vegas has been following patient. Grade 2 [place] ulcer posterior left ankle with exposed tendon and MRSA infection. Rule out distal tibial osteomyelitis. Grade 1 [place] ulcer left medial ankle. Recommendation:   1) Continue IV antibiotics as per medicine for MRSA and Pseudomonas aeruginosa infection. Would consider ID consult. 2)  X-ray left foot and ankle shows lucency posterior distal tibia possible osteomyelitis. Patient unable to to have MRI secondary to past brain surgery with vascular clips. ED ordered bone scan left lower extremity to help rule out osteomyelitis distal tibia. 3)  Ordered x-ray left femur to image fracture for stability prior to left ankle ulcer debridement in OR. We'll try and contact Dr. Sai Vegas for his okay prior to taking patient to the OR secondary to the femur fracture. 4)  Spoke with Shirley Loya in nuclear medicine regarding technetium bone scan, and she stated that she would try to get it done this weekend, to help us determine osteomyelitis of the distal tibia, prior to the OR. If positive for OM, would do a distal tibia bone biopsy for ID reference for 6-8 weeks of IV antibiotics. 5)  Planning for possible OR deep debridement with Stravix  grafting and wound VAC, of the left ankle ulcers, on Monday if possible.

## 2021-08-21 NOTE — PROGRESS NOTES
Podiatry:  Patient seen at bedside today for treatment of painful infected left ankle ulcers with exposed Achilles tendon. Patient states he fell out of his chair on July 12 and fractured his left femur. He has been following with Dr. Mily Reed but opted for no surgery. He has been wearing a brace on his left leg which subsequently rubbed and caused the ulcer in the back of his left ankle. Nurses at [facility] culture the wound was positive for MRSA and Pseudomonas. He was taking doxycycline, but the wound worsened and was admitted through the ED for more aggressive care. Left ankle x-ray shows possible lucency at the posterior distal tibia suggesting osteomyelitis. Patient is unable to have an MRI secondary to vascular clips in his brain. Therefore three-phase bone scan has been ordered to help determine tibial osteomyelitis. Patient will require surgical debridement with grafting to cover his Achilles tendon wound. This can be done on Monday depending on bone scan results, which might require distal tibia bone biopsy if the bone scan is positive for OM, then 6-8 weeks IV antibiotics. See consult for details.

## 2021-08-21 NOTE — PROGRESS NOTES
Reason for Admission:   infected left ankle wound                 RUR Score:  High; 31%         PCP: First and Last name:  Alla Arthur MD     Name of Practice:   Are you a current patient: Yes/No:   Approximate date of last visit:    Can you do a virtual visit with your PCP:              Resources/supports as identified by patient/family:                   Top Challenges facing patient (as identified by patient/family and CM): Finances/Medication cost?                    Transportation? Support system or lack thereof? Living arrangements? Self-care/ADLs/Cognition? Current Advanced Directive/Advance Care Plan:  Full Code      Healthcare Decision Maker:   Click here to complete HealthCare Decision Makers including selection of the Healthcare Decision Maker Relationship (ie \"Primary\")      [facility] - [phone]    Secondary Decision Maker: Lenora Tirado - Daughter - [phone]    Payor Source Payor: Laci Gilliland / Plan: [place] MEDICARE PART A / Product Type: Medicare /                             Plan for utilizing home health:    Unlikely                  Transition of Care Plan:   Return to [address] [place] [address]               Chart reviewed.   Per H&P \"Dre Natarajan is a 64 y.o. male with history of multiple medical problems including COPD with oxygen requirement, chronic respiratory failure with hypoxia requiring oxygen, paroxysmal atrial fibrillation, hypertension, chronic kidney disease stage III, history of prostate cancer and osteoporosis who presents to the emergency department C/O left ankle/foot wound.  Patient is currently at [address] for rehabilitation. Yunior Barraza was recently seen at our facility for a fracture of his left femur.  At that time he was admitted to the hospital and did not undergo surgical treatment and opted for conservative management.    Patient is a poor historian and hard of hearing so most of his history is obtained from chart review and discussion with the ER physician who also spoke to staff at [address]. Apparently, when patient opted for medical management only and not surgical management he was given a brace and is thought that the brace may have caused a worsening wound on his left ankle. Back to [facility] was apparently concerned that the wound was very close to his Achilles tendon. They cultured the wound and it grew out MRSA so they started him on doxycycline which he has been doing for the last 4 to 5 days. However they decided to send him over to the ED today because he became concerned that his wound was continuing to worsen and that purulent material is copiously oozing from the wound. Emergency room physician relayed that the wound was quite concerning with significant amount of odor and drainage and Achilles tendons actually visible because of the deep involvement of the wound. X-rays were taken which demonstrated likely osteomyelitis. Did try to obtain MRI but there was some history the patient had aneurysm with coil and so MRI was advised not to be safe. ER physician spoke to podiatrist, Dr. Malika Valdez who recommended a bone scan which has been ordered and advised that patient be treated like he has osteomyelitis and Dr. Malika Valdez will see him tomorrow. \"    Noted pt is pending a podiatry consult. Pt was transferred from [facility] and was transferred to [facility] 7/15/21. Please consider ordering therapy services when appropriate to assist with identifying a transition of care. CM spoke with pt's  wife, Rachna Leal ([phone]), and she has indicated the plan is for pt to return to [facility] when medically stable. Anticipate pt will return to [facility] when medically stable.   CM to continue to follow and assist.      Care Management Interventions  Mode of Transport at Discharge: BLS  Transition of Care Consult (CM Consult): Discharge Planning, SNF  Health Maintenance Reviewed: Yes  Current Support Network: Lives with Spouse, [address]  The Plan for Transition of Care is Related to the Following Treatment Goals : Return to [facility]  The Patient and/or Patient Representative was Provided with a Choice of Provider and Agrees with the Discharge Plan?: Yes  Name of the Patient Representative Who was Provided with a Choice of Provider and Agrees with the Discharge Plan: spouse  Freedom of Choice List was Provided with Basic Dialogue that Supports the Patient's Individualized Plan of Care/Goals, Treatment Preferences and Shares the Quality Data Associated with the Providers?: Yes  Discharge Location  Discharge Placement: Skilled nursing facility ([facility] )

## 2021-08-21 NOTE — ED NOTES
Unit notified that SBAR ready for review, may call this RN to answer any questions. ER Charge Nurse made aware, patient will be taken to medical unit at this time.

## 2021-08-21 NOTE — PROGRESS NOTES
Hospitalist Progress Note    Patient: Porter Shankar MRN: [id number]  [facility]: [id number]    YOB: 1945  Age: 64 y.o. Sex: male    DOA: 8/20/2021 LOS:  LOS: 1 day            Assessment/Plan     Principal Problem:    Osteomyelitis of left ankle ([address] (8/20/2021)    Active Problems:    Chronic obstructive pulmonary disease ([address] (4/20/2018)      Age-related physical debility (7/8/2019)      Open wound of left ankle (8/20/2021)      Infected wound (8/20/2021)      Supplemental oxygen dependent (8/20/2021)      MRSA (methicillin resistant staph aureus) culture positive (8/20/2021)      Pyogenic inflammation of bone ([facility] [address] (8/20/2021)      CC: Porter Shankar is a 64 y.o. male with history of multiple medical problems including COPD with oxygen requirement, chronic respiratory failure with hypoxia requiring oxygen, paroxysmal atrial fibrillation, hypertension, chronic kidney disease stage III, history of prostate cancer and osteoporosis admitted for infected left ankle wound that is MRSA+ and is likely has bone involvement c/w osteomyelitis.      Infected left ankle wound/Oseomyelitis: Continue IV vancomycin and cefepime  Dr. Claudia Biswas consulted. Discussed the case with Dr. Claudia Biswas who will see patient in a.m. and possible graft on Monday. Bone scan ordered  Wound care consult  Brace removed  No signs of sepsis, lactic acid normal, even white blood count normal  Blood cultures and wound cultures obtained today, will follow     COPD with chronic respiratory failure with hypoxia requiring oxygen: Resume home respiratory medications include DuoNebs every 4 hours  Oxygen supplementation with, requires 2 L at home, to keep at 92%     Paroxysmal atrial fibrillation: On subcutaneous Lovenox at home for DVT prophylaxis. Resume Cardizem     Hypertension: Controlled.  Resume home medications     Chronic kidney disease stage III : Creatinine stable and within normal limits at 1.01     DVT prophylaxis: Heparin    GI prophylaxis: Pepcid     Remain hospital care             Subjective:     Pt was seen and examined with the nurse in the morning round. \" I feel better already When is my surgery? \"    Review of systems  General: No fevers or chills. Cardiovascular: No chest pain or pressure. No palpitations. Pulmonary: No cough, SOB  Gastrointestinal: No nausea, vomiting. Objective:      Visit Vitals  /64   Pulse 100   Temp 97.5 °F (36.4 °C)   Resp 18   Ht 5' 9.02\" (1.753 m)   Wt 87.1 kg (192 lb)   SpO2 97%   BMI 28.34 kg/m²       Physical Exam:    Gen: NAD, non-toxic. On NC O2  Heent:  MMM, NC, AT. Cor: s1s2 RRR. No MRG. PMI mid 5th intercostal space. Resp:  CTA b/l. No w/r/r. Nml effort and diaphragmatic excursion. Abd:  NT ND.  BS positive. No rebound or guarding. No masses. Ext: wound on the dorsal aspect of his left foot measuring about 2 cm in diameter that does not appear infected or inflamed, second wound on the posterior aspect of ankle approximate to his lower Achilles tendon, about 4 cm x 2 cm in size of purulent drainage, appears inflamed and infected. Intake and Output:  Current Shift:  No intake/output data recorded. Last three shifts:  08/19 1901 - 08/21 0700  In: 750 [I.V.:750]  Out: 300 [Urine:300]    Labs: Results:       Chemistry Recent Labs     08/21/21  0548 08/20/21  1415 08/19/21  0715   * 83 92    139 140   K 3.9 4.2 3.9    104 108   CO2 27 31 28   BUN 34* 32* 32*   CREA 1.02 1.01 0.96   CA 8.6 9.0 8.5   AGAP 6 4 4   BUCR 33* 32* 33*   AP  --   --  99   TP  --   --  5.7*   ALB  --   --  2.4*   GLOB  --   --  3.3   AGRAT  --   --  0.7*      CBC w/Diff Recent Labs     08/21/21  0548 08/20/21  1415 08/19/21  0715   WBC 9.8 10.8 7.4   RBC 3.25* 3.73* 3.12*   HGB 8.8* 10.1* 8.3*   HCT 28.3* 32.5* 27.1*    344 291   GRANS  --  64 70   LYMPH  --  10* 15*   EOS  --  19* 5      Cardiac Enzymes No results for input(s): CPK, CKND1, WILLARD in the last 72 hours.     No lab exists for component: CKRMB, TROIP   Coagulation No results for input(s): PTP, INR, APTT, INREXT in the last 72 hours. Lipid Panel No results found for: CHOL, CHOLPOCT, CHOLX, CHLST, CHOLV, 237878, HDL, HDLP, LDL, LDLC, DLDLP, 875325, VLDLC, VLDL, TGLX, TRIGL, TRIGP, TGLPOCT, CHHD, CHHDX   BNP No results for input(s): BNPP in the last 72 hours.    Liver Enzymes Recent Labs     08/19/21  0715   TP 5.7*   ALB 2.4*   AP 99      Thyroid Studies Lab Results   Component Value Date/Time    TSH 3.06 04/05/2021 01:30 PM        Procedures/imaging: see electronic medical records for all procedures/Xrays and details which were not copied into this note but were reviewed prior to creation of Plan      Medications Reviewed  Alejo Rehman MD

## 2021-08-21 NOTE — PROGRESS NOTES
Bedside shift change report given to Thierry Douglass RN (oncoming nurse) by Melody Suárez Rn (offgoing nurse). Report included the following information SBAR, Kardex and Recent Results.

## 2021-08-21 NOTE — PROGRESS NOTES
Spoke With Dr. Kory Porter, I explained to him that we do not normally do bone scans on the weekend; however, due to patient situation will speak with radiologist in the morning before coming in to do bone scan tomorrow. Dr. Kory Porter was wanting a three phase bone scan done before patient was going to surgery on Monday. Will be in contact with Dr. Kory Porter and the floor in the morning to figure out timing of bone scan. Also Spoke to Xray stating that Dr. Kory Porter was wondering about coordination of xray and bone scan, but xray said that Femur xray would be done before tomorrow.

## 2021-08-22 ENCOUNTER — APPOINTMENT (OUTPATIENT)
Dept: NUCLEAR MEDICINE | Age: 76
DRG: 464 | End: 2021-08-22
Attending: PODIATRIST
Payer: MEDICARE

## 2021-08-22 LAB
ANION GAP SERPL CALC-SCNC: 8 MMOL/L (ref 3–18)
BUN SERPL-MCNC: 30 MG/DL (ref 7–18)
BUN/CREAT SERPL: 25 (ref 12–20)
CALCIUM SERPL-MCNC: 8.6 MG/DL (ref 8.5–10.1)
CHLORIDE SERPL-SCNC: 105 MMOL/L (ref 100–111)
CO2 SERPL-SCNC: 27 MMOL/L (ref 21–32)
CREAT SERPL-MCNC: 1.21 MG/DL (ref 0.6–1.3)
ERYTHROCYTE [DISTWIDTH] IN BLOOD BY AUTOMATED COUNT: 14.4 % (ref 11.6–14.5)
GLUCOSE SERPL-MCNC: 110 MG/DL (ref 74–99)
HCT VFR BLD AUTO: 29.1 % (ref 36–48)
HGB BLD-MCNC: 8.9 G/DL (ref 13–16)
MCH RBC QN AUTO: 27.3 PG (ref 24–34)
MCHC RBC AUTO-ENTMCNC: 30.6 G/DL (ref 31–37)
MCV RBC AUTO: 89.3 FL (ref 74–97)
PLATELET # BLD AUTO: 288 K/UL (ref 135–420)
PMV BLD AUTO: 9.3 FL (ref 9.2–11.8)
POTASSIUM SERPL-SCNC: 3.8 MMOL/L (ref 3.5–5.5)
RBC # BLD AUTO: 3.26 M/UL (ref 4.35–5.65)
SODIUM SERPL-SCNC: 140 MMOL/L (ref 136–145)
VANCOMYCIN TROUGH SERPL-MCNC: 25.1 UG/ML (ref 10–20)
WBC # BLD AUTO: 10.1 K/UL (ref 4.6–13.2)

## 2021-08-22 PROCEDURE — 74011250636 HC RX REV CODE- 250/636: Performed by: FAMILY MEDICINE

## 2021-08-22 PROCEDURE — 80048 BASIC METABOLIC PNL TOTAL CA: CPT

## 2021-08-22 PROCEDURE — 65270000029 HC RM PRIVATE

## 2021-08-22 PROCEDURE — 36415 COLL VENOUS BLD VENIPUNCTURE: CPT

## 2021-08-22 PROCEDURE — 94640 AIRWAY INHALATION TREATMENT: CPT

## 2021-08-22 PROCEDURE — 80202 ASSAY OF VANCOMYCIN: CPT

## 2021-08-22 PROCEDURE — 94760 N-INVAS EAR/PLS OXIMETRY 1: CPT

## 2021-08-22 PROCEDURE — 77010033678 HC OXYGEN DAILY

## 2021-08-22 PROCEDURE — 85027 COMPLETE CBC AUTOMATED: CPT

## 2021-08-22 PROCEDURE — 74011250637 HC RX REV CODE- 250/637: Performed by: FAMILY MEDICINE

## 2021-08-22 PROCEDURE — 74011000250 HC RX REV CODE- 250: Performed by: FAMILY MEDICINE

## 2021-08-22 RX ADMIN — ARFORMOTEROL TARTRATE 15 MCG: 15 SOLUTION RESPIRATORY (INHALATION) at 07:38

## 2021-08-22 RX ADMIN — CEFEPIME HYDROCHLORIDE 2 G: 2 INJECTION, POWDER, FOR SOLUTION INTRAVENOUS at 13:00

## 2021-08-22 RX ADMIN — IPRATROPIUM BROMIDE AND ALBUTEROL SULFATE 3 ML: .5; 3 SOLUTION RESPIRATORY (INHALATION) at 04:22

## 2021-08-22 RX ADMIN — CEFEPIME HYDROCHLORIDE 2 G: 2 INJECTION, POWDER, FOR SOLUTION INTRAVENOUS at 22:00

## 2021-08-22 RX ADMIN — CEFEPIME HYDROCHLORIDE 2 G: 2 INJECTION, POWDER, FOR SOLUTION INTRAVENOUS at 04:58

## 2021-08-22 RX ADMIN — FAMOTIDINE 20 MG: 20 TABLET, FILM COATED ORAL at 08:55

## 2021-08-22 RX ADMIN — VANCOMYCIN HYDROCHLORIDE 750 MG: 750 INJECTION, POWDER, LYOPHILIZED, FOR SOLUTION INTRAVENOUS at 21:25

## 2021-08-22 RX ADMIN — FERROUS SULFATE TAB 325 MG (65 MG ELEMENTAL FE) 325 MG: 325 (65 FE) TAB at 08:55

## 2021-08-22 RX ADMIN — IPRATROPIUM BROMIDE AND ALBUTEROL SULFATE 3 ML: .5; 3 SOLUTION RESPIRATORY (INHALATION) at 00:28

## 2021-08-22 RX ADMIN — Medication 10 ML: at 21:25

## 2021-08-22 RX ADMIN — TAMSULOSIN HYDROCHLORIDE 0.4 MG: 0.4 CAPSULE ORAL at 17:55

## 2021-08-22 RX ADMIN — VANCOMYCIN HYDROCHLORIDE 750 MG: 750 INJECTION, POWDER, LYOPHILIZED, FOR SOLUTION INTRAVENOUS at 08:55

## 2021-08-22 RX ADMIN — IPRATROPIUM BROMIDE AND ALBUTEROL SULFATE 3 ML: .5; 3 SOLUTION RESPIRATORY (INHALATION) at 17:10

## 2021-08-22 RX ADMIN — IPRATROPIUM BROMIDE AND ALBUTEROL SULFATE 3 ML: .5; 3 SOLUTION RESPIRATORY (INHALATION) at 07:39

## 2021-08-22 RX ADMIN — IPRATROPIUM BROMIDE AND ALBUTEROL SULFATE 3 ML: .5; 3 SOLUTION RESPIRATORY (INHALATION) at 19:31

## 2021-08-22 RX ADMIN — Medication 10 ML: at 05:00

## 2021-08-22 RX ADMIN — BUDESONIDE 500 MCG: 0.5 INHALANT RESPIRATORY (INHALATION) at 19:32

## 2021-08-22 RX ADMIN — FERROUS SULFATE TAB 325 MG (65 MG ELEMENTAL FE) 325 MG: 325 (65 FE) TAB at 17:55

## 2021-08-22 RX ADMIN — ARFORMOTEROL TARTRATE 15 MCG: 15 SOLUTION RESPIRATORY (INHALATION) at 19:32

## 2021-08-22 RX ADMIN — ACETAMINOPHEN 650 MG: 325 TABLET, FILM COATED ORAL at 18:25

## 2021-08-22 RX ADMIN — IPRATROPIUM BROMIDE AND ALBUTEROL SULFATE 3 ML: .5; 3 SOLUTION RESPIRATORY (INHALATION) at 11:48

## 2021-08-22 RX ADMIN — HEPARIN SODIUM 5000 UNITS: 5000 INJECTION INTRAVENOUS; SUBCUTANEOUS at 04:58

## 2021-08-22 RX ADMIN — HEPARIN SODIUM 5000 UNITS: 5000 INJECTION INTRAVENOUS; SUBCUTANEOUS at 21:24

## 2021-08-22 RX ADMIN — BUDESONIDE 500 MCG: 0.5 INHALANT RESPIRATORY (INHALATION) at 07:38

## 2021-08-22 RX ADMIN — HEPARIN SODIUM 5000 UNITS: 5000 INJECTION INTRAVENOUS; SUBCUTANEOUS at 13:00

## 2021-08-22 RX ADMIN — FAMOTIDINE 20 MG: 20 TABLET, FILM COATED ORAL at 21:25

## 2021-08-22 RX ADMIN — FUROSEMIDE 20 MG: 20 TABLET ORAL at 08:55

## 2021-08-22 NOTE — PROGRESS NOTES
Nutrition Assessment     Type and Reason for Visit:  Patient with a stage 4 wound to left foot. Nutrition Recommendations/Plan: recommend to add vitamin C + 220 mg zinc. Recommend to add Ensure once per day (350 calories, 13 grams protein)    Nutrition Assessment:   Patient with stage 4 wound to left foot. Estimated Daily Nutrient Needs:  Energy (kcal):   1742 (20 kcal/kg)  Protein (g): 87.7 (one gram/kg)          Fluid (ml/day):   1742    Nutrition Related Findings:    labs and meds noted. Current Nutrition Therapies:  ADULT DIET Regular    Anthropometric Measures:  · Height:  5' 9.02\" (175.3 cm)  · Current Body Wt: 87.1 kg    · BMI:  28.3    Nutrition Diagnosis:   ·   Increased nutrient needs related to wound as evidenced by patient with a stage 4 wounds. Goals:      Patient to continue to eat greater than 50% of meals + one Ensure plus per day.              Electronically signed by Cyn Perkins RD on 8/22/2021 at 10:31 AM

## 2021-08-22 NOTE — PROGRESS NOTES
Patient IV was not working, Nurse came to attempt IV access tried four times with no success. Patient said he did not want to be stuck anymore.

## 2021-08-22 NOTE — PROGRESS NOTES
Bedside and Verbal shift change report given to Delmy WANG RN (oncoming nurse) by  Tejinder Price RN (offgoing nurse).  Report included the following information SBAR, Kardex, Intake/Output, MAR, Recent Results

## 2021-08-22 NOTE — PROGRESS NOTES
Problem: Risk for Spread of Infection  Goal: Prevent transmission of infectious organism to others  Description: Prevent the transmission of infectious organisms to other patients, staff members, and visitors.   Outcome: Progressing Towards Goal     Problem: Patient Education:  Go to Education Activity  Goal: Patient/Family Education  Outcome: Progressing Towards Goal     Problem: General Infection Care Plan (Adult and Pediatric)  Goal: Improvement in signs and symptoms of infection  Outcome: Progressing Towards Goal  Goal: *Optimize nutritional status  Outcome: Progressing Towards Goal     Problem: Patient Education: Go to Patient Education Activity  Goal: Patient/Family Education  Outcome: Progressing Towards Goal

## 2021-08-22 NOTE — PROGRESS NOTES
Hospitalist Progress Note    Patient: Betty Cook MRN: [id number]  CSN: [id number]    YOB: 1945  Age: 64 y.o. Sex: male    DOA: 8/20/2021 LOS:  LOS: 2 days            Assessment/Plan     Principal Problem:    Osteomyelitis of left ankle ([facility] (8/20/2021)    Active Problems:    Chronic obstructive pulmonary disease ([address] (4/20/2018)      Age-related physical debility (7/8/2019)      Open wound of left ankle (8/20/2021)      Infected wound (8/20/2021)      Supplemental oxygen dependent (8/20/2021)      MRSA (methicillin resistant staph aureus) culture positive (8/20/2021)      Pyogenic inflammation of bone ([facility] [address] (8/20/2021)      CC: Aimee Park is a 64 y. o. male with history of multiple medical problems including COPD with oxygen requirement, chronic respiratory failure with hypoxia requiring oxygen, paroxysmal atrial fibrillation, hypertension, chronic kidney disease stage III, history of prostate cancer and osteoporosis admitted for infected left ankle wound that is MRSA+ and is likely has bone involvement c/w osteomyelitis, left femur fracture     Infected left ankle wound/Oseomyelitis: Continue IV vancomycin and cefepime  Dr. Naomi Noel consulted and flowing. Bone scan ordered/Stat today  Wound care consult  Brace removed  No signs of sepsis, lactic acid normal, even white blood count normal  Blood cultures and wound cultures obtained today, will follow    Left femur fracture on 07/12: He was treated by Dr. Altagracia Burks and decided against surgery at that time. Will consult to ortho. Likely need surgery     COPD with chronic respiratory failure with hypoxia requiring oxygen: Resume home respiratory medications include DuoNebs every 4 hours  Oxygen supplementation with, requires 2 L at home, to keep at 92%     Paroxysmal atrial fibrillation: On subcutaneous Lovenox at home for DVT prophylaxis. Resume Cardizem     Hypertension: Controlled.  Resume home medications     Chronic kidney disease stage III : Creatinine stable and within normal limits at 1.01     DVT prophylaxis: Heparin     GI prophylaxis: Pepcid     Remain hospital care           Subjective:     Pt was seen and examined with the nurse in the morning round. \" I need my bone scan but I feel better\"    Review of systems  General: No fevers or chills. Cardiovascular: No chest pain or pressure. No palpitations. Pulmonary: No cough, SOB  Gastrointestinal: No nausea, vomiting. Objective:      Visit Vitals  BP (!) 107/53   Pulse 84   Temp 98.3 °F (36.8 °C)   Resp 17   Ht 5' 9.02\" (1.753 m)   Wt 87.1 kg (192 lb)   SpO2 98%   BMI 28.34 kg/m²       Physical Exam:    Gen: NAD, non-toxic. On NC O2  Heent:  MMM, NC, AT. Cor: s1s2 RRR. No MRG. PMI mid 5th intercostal space. Resp:  CTA b/l. No w/r/r. Nml effort and diaphragmatic excursion. Abd:  NT ND.  BS positive. No rebound or guarding. No masses. Ext: wound on the dorsal aspect of his left foot measuring about 2 cm in diameter that does not appear infected or inflamed, second wound on the posterior aspect of ankle approximate to his lower Achilles tendon, about 4 cm x 2 cm in size of purulent drainage, appears inflamed and infected. Intake and Output:  Current Shift:  No intake/output data recorded. Last three shifts:  08/20 1901 - 08/22 0700  In: 1180 [P.O.:420;  I.V.:760]  Out: 2200 [Urine:2200]    Labs: Results:       Chemistry Recent Labs     08/22/21  0535 08/21/21  0548 08/20/21  1415   * 108* 83    140 139   K 3.8 3.9 4.2    107 104   CO2 27 27 31   BUN 30* 34* 32*   CREA 1.21 1.02 1.01   CA 8.6 8.6 9.0   AGAP 8 6 4   BUCR 25* 33* 32*      CBC w/Diff Recent Labs     08/22/21  0535 08/21/21  0548 08/20/21  1415   WBC 10.1 9.8 10.8   RBC 3.26* 3.25* 3.73*   HGB 8.9* 8.8* 10.1*   HCT 29.1* 28.3* 32.5*    278 344   GRANS  --   --  64   LYMPH  --   --  10*   EOS  --   --  19*      Cardiac Enzymes No results for input(s): CPK, CKND1, WILLARD in the last 72 hours.    No lab exists for component: CKRMB, TROIP   Coagulation No results for input(s): PTP, INR, APTT, INREXT in the last 72 hours. Lipid Panel No results found for: CHOL, CHOLPOCT, CHOLX, CHLST, CHOLV, 177983, HDL, HDLP, LDL, LDLC, DLDLP, 403214, VLDLC, VLDL, TGLX, TRIGL, TRIGP, TGLPOCT, CHHD, CHHDX   BNP No results for input(s): BNPP in the last 72 hours. Liver Enzymes No results for input(s): TP, ALB, TBIL, AP in the last 72 hours.     No lab exists for component: SGOT, GPT, DBIL   Thyroid Studies Lab Results   Component Value Date/Time    TSH 3.06 04/05/2021 01:30 PM        Procedures/imaging: see electronic medical records for all procedures/Xrays and details which were not copied into this note but were reviewed prior to creation of Plan      Medications Reviewed  Chivo Dewey MD

## 2021-08-22 NOTE — PROGRESS NOTES
Problem: Risk for Spread of Infection  Goal: Prevent transmission of infectious organism to others  Description: Prevent the transmission of infectious organisms to other patients, staff members, and visitors. Outcome: Progressing Towards Goal     Problem: Patient Education:  Go to Education Activity  Goal: Patient/Family Education  Outcome: Progressing Towards Goal     Problem: General Infection Care Plan (Adult and Pediatric)  Goal: Improvement in signs and symptoms of infection  Outcome: Progressing Towards Goal  Goal: *Optimize nutritional status  Outcome: Progressing Towards Goal     Problem: Patient Education: Go to Patient Education Activity  Goal: Patient/Family Education  Outcome: Progressing Towards Goal     Problem: Falls - Risk of  Goal: *Absence of Falls  Description: Document Darell Fall Risk and appropriate interventions in the flowsheet. Outcome: Progressing Towards Goal  Note: Fall Risk Interventions:            Medication Interventions: Bed/chair exit alarm    Elimination Interventions:  Toilet paper/wipes in reach, Toileting schedule/hourly rounds    History of Falls Interventions: Door open when patient unattended         Problem: Patient Education: Go to Patient Education Activity  Goal: Patient/Family Education  Outcome: Progressing Towards Goal

## 2021-08-23 ENCOUNTER — ANESTHESIA (OUTPATIENT)
Dept: SURGERY | Age: 76
DRG: 464 | End: 2021-08-23
Payer: MEDICARE

## 2021-08-23 ENCOUNTER — ANESTHESIA EVENT (OUTPATIENT)
Dept: SURGERY | Age: 76
DRG: 464 | End: 2021-08-23
Payer: MEDICARE

## 2021-08-23 ENCOUNTER — APPOINTMENT (OUTPATIENT)
Dept: NUCLEAR MEDICINE | Age: 76
DRG: 464 | End: 2021-08-23
Attending: PODIATRIST
Payer: MEDICARE

## 2021-08-23 PROBLEM — L97.323 NON-PRESSURE CHRONIC ULCER OF LEFT ANKLE WITH NECROSIS OF MUSCLE (HCC): Status: ACTIVE | Noted: 2021-08-23

## 2021-08-23 PROBLEM — M86.9 OSTEOMYELITIS OF LEFT ANKLE (HCC): Status: RESOLVED | Noted: 2021-08-20 | Resolved: 2021-08-23

## 2021-08-23 PROBLEM — L97.323 NON-PRESSURE CHRONIC ULCER OF LEFT ANKLE WITH NECROSIS OF MUSCLE (HCC): Status: RESOLVED | Noted: 2021-08-23 | Resolved: 2021-08-23

## 2021-08-23 PROBLEM — S91.002A OPEN WOUND OF LEFT ANKLE: Status: RESOLVED | Noted: 2021-08-20 | Resolved: 2021-08-23

## 2021-08-23 PROBLEM — L03.116 CELLULITIS OF LEFT ANKLE: Status: ACTIVE | Noted: 2021-08-23

## 2021-08-23 LAB
ANION GAP SERPL CALC-SCNC: 7 MMOL/L (ref 3–18)
BUN SERPL-MCNC: 26 MG/DL (ref 7–18)
BUN/CREAT SERPL: 25 (ref 12–20)
CALCIUM SERPL-MCNC: 8.6 MG/DL (ref 8.5–10.1)
CHLORIDE SERPL-SCNC: 108 MMOL/L (ref 100–111)
CO2 SERPL-SCNC: 26 MMOL/L (ref 21–32)
CREAT SERPL-MCNC: 1.02 MG/DL (ref 0.6–1.3)
DATE LAST DOSE: NORMAL
ERYTHROCYTE [DISTWIDTH] IN BLOOD BY AUTOMATED COUNT: 14.5 % (ref 11.6–14.5)
GLUCOSE BLD STRIP.AUTO-MCNC: 103 MG/DL (ref 70–110)
GLUCOSE SERPL-MCNC: 96 MG/DL (ref 74–99)
HCT VFR BLD AUTO: 26.4 % (ref 36–48)
HGB BLD-MCNC: 8.3 G/DL (ref 13–16)
MCH RBC QN AUTO: 26.6 PG (ref 24–34)
MCHC RBC AUTO-ENTMCNC: 31.4 G/DL (ref 31–37)
MCV RBC AUTO: 84.6 FL (ref 74–97)
PLATELET # BLD AUTO: 265 K/UL (ref 135–420)
PMV BLD AUTO: 8.9 FL (ref 9.2–11.8)
POTASSIUM SERPL-SCNC: 3.4 MMOL/L (ref 3.5–5.5)
RBC # BLD AUTO: 3.12 M/UL (ref 4.35–5.65)
REPORTED DOSE,DOSE: NORMAL UNITS
REPORTED DOSE/TIME,TMG: 1020
SODIUM SERPL-SCNC: 141 MMOL/L (ref 136–145)
VANCOMYCIN TROUGH SERPL-MCNC: 18 UG/ML (ref 10–20)
WBC # BLD AUTO: 10.9 K/UL (ref 4.6–13.2)

## 2021-08-23 PROCEDURE — 36415 COLL VENOUS BLD VENIPUNCTURE: CPT

## 2021-08-23 PROCEDURE — 74011250636 HC RX REV CODE- 250/636: Performed by: FAMILY MEDICINE

## 2021-08-23 PROCEDURE — 74011250636 HC RX REV CODE- 250/636: Performed by: HOSPITALIST

## 2021-08-23 PROCEDURE — 85027 COMPLETE CBC AUTOMATED: CPT

## 2021-08-23 PROCEDURE — A9503 TC99M MEDRONATE: HCPCS

## 2021-08-23 PROCEDURE — 87205 SMEAR GRAM STAIN: CPT

## 2021-08-23 PROCEDURE — 74011000250 HC RX REV CODE- 250: Performed by: NURSE ANESTHETIST, CERTIFIED REGISTERED

## 2021-08-23 PROCEDURE — 74011000250 HC RX REV CODE- 250: Performed by: FAMILY MEDICINE

## 2021-08-23 PROCEDURE — 76060000033 HC ANESTHESIA 1 TO 1.5 HR: Performed by: PODIATRIST

## 2021-08-23 PROCEDURE — 74011250636 HC RX REV CODE- 250/636: Performed by: NURSE ANESTHETIST, CERTIFIED REGISTERED

## 2021-08-23 PROCEDURE — 80048 BASIC METABOLIC PNL TOTAL CA: CPT

## 2021-08-23 PROCEDURE — 77030031741 HC HNDPC IRR VRSAJET PLS S&N -F: Performed by: PODIATRIST

## 2021-08-23 PROCEDURE — 87075 CULTR BACTERIA EXCEPT BLOOD: CPT

## 2021-08-23 PROCEDURE — 2709999900 HC NON-CHARGEABLE SUPPLY: Performed by: PODIATRIST

## 2021-08-23 PROCEDURE — 80202 ASSAY OF VANCOMYCIN: CPT

## 2021-08-23 PROCEDURE — 87186 SC STD MICRODIL/AGAR DIL: CPT

## 2021-08-23 PROCEDURE — 74011250637 HC RX REV CODE- 250/637: Performed by: FAMILY MEDICINE

## 2021-08-23 PROCEDURE — 94640 AIRWAY INHALATION TREATMENT: CPT

## 2021-08-23 PROCEDURE — 0HRNXK3 REPLACEMENT OF LEFT FOOT SKIN WITH NONAUTOLOGOUS TISSUE SUBSTITUTE, FULL THICKNESS, EXTERNAL APPROACH: ICD-10-PCS | Performed by: PODIATRIST

## 2021-08-23 PROCEDURE — 77030000032 HC CUF TRNQT ZIMM -B: Performed by: PODIATRIST

## 2021-08-23 PROCEDURE — 77030031139 HC SUT VCRL2 J&J -A: Performed by: PODIATRIST

## 2021-08-23 PROCEDURE — 94760 N-INVAS EAR/PLS OXIMETRY 1: CPT

## 2021-08-23 PROCEDURE — 82962 GLUCOSE BLOOD TEST: CPT

## 2021-08-23 PROCEDURE — 77030040361 HC SLV COMPR DVT MDII -B: Performed by: PODIATRIST

## 2021-08-23 PROCEDURE — 77030002916 HC SUT ETHLN J&J -A: Performed by: PODIATRIST

## 2021-08-23 PROCEDURE — 76010000149 HC OR TIME 1 TO 1.5 HR: Performed by: PODIATRIST

## 2021-08-23 PROCEDURE — 74011250636 HC RX REV CODE- 250/636: Performed by: PODIATRIST

## 2021-08-23 PROCEDURE — 77030002933 HC SUT MCRYL J&J -A: Performed by: PODIATRIST

## 2021-08-23 PROCEDURE — 65270000029 HC RM PRIVATE

## 2021-08-23 PROCEDURE — 76210000063 HC OR PH I REC FIRST 0.5 HR: Performed by: PODIATRIST

## 2021-08-23 PROCEDURE — 74011000250 HC RX REV CODE- 250: Performed by: PODIATRIST

## 2021-08-23 PROCEDURE — 77010033678 HC OXYGEN DAILY

## 2021-08-23 PROCEDURE — 87077 CULTURE AEROBIC IDENTIFY: CPT

## 2021-08-23 DEVICE — GRAFT HUM TISS W3XL6CM CRYOPRESERVED PLCNTA TISS UMB AMNION: Type: IMPLANTABLE DEVICE | Site: ANKLE | Status: FUNCTIONAL

## 2021-08-23 RX ORDER — DIPHENHYDRAMINE HYDROCHLORIDE 50 MG/ML
25 INJECTION, SOLUTION INTRAMUSCULAR; INTRAVENOUS
Status: DISCONTINUED | OUTPATIENT
Start: 2021-08-23 | End: 2021-08-26 | Stop reason: HOSPADM

## 2021-08-23 RX ORDER — SODIUM CHLORIDE, SODIUM LACTATE, POTASSIUM CHLORIDE, CALCIUM CHLORIDE 600; 310; 30; 20 MG/100ML; MG/100ML; MG/100ML; MG/100ML
50 INJECTION, SOLUTION INTRAVENOUS CONTINUOUS
Status: DISCONTINUED | OUTPATIENT
Start: 2021-08-23 | End: 2021-08-23 | Stop reason: HOSPADM

## 2021-08-23 RX ORDER — OXYCODONE AND ACETAMINOPHEN 5; 325 MG/1; MG/1
1 TABLET ORAL AS NEEDED
Status: DISCONTINUED | OUTPATIENT
Start: 2021-08-23 | End: 2021-08-23 | Stop reason: HOSPADM

## 2021-08-23 RX ORDER — SODIUM CHLORIDE, SODIUM LACTATE, POTASSIUM CHLORIDE, CALCIUM CHLORIDE 600; 310; 30; 20 MG/100ML; MG/100ML; MG/100ML; MG/100ML
125 INJECTION, SOLUTION INTRAVENOUS CONTINUOUS
Status: DISCONTINUED | OUTPATIENT
Start: 2021-08-23 | End: 2021-08-25

## 2021-08-23 RX ORDER — FENTANYL CITRATE 50 UG/ML
25 INJECTION, SOLUTION INTRAMUSCULAR; INTRAVENOUS
Status: DISCONTINUED | OUTPATIENT
Start: 2021-08-23 | End: 2021-08-23 | Stop reason: HOSPADM

## 2021-08-23 RX ORDER — ONDANSETRON 2 MG/ML
4 INJECTION INTRAMUSCULAR; INTRAVENOUS ONCE
Status: DISCONTINUED | OUTPATIENT
Start: 2021-08-23 | End: 2021-08-23 | Stop reason: HOSPADM

## 2021-08-23 RX ORDER — BUPIVACAINE HYDROCHLORIDE AND EPINEPHRINE 5; 5 MG/ML; UG/ML
INJECTION, SOLUTION EPIDURAL; INTRACAUDAL; PERINEURAL AS NEEDED
Status: DISCONTINUED | OUTPATIENT
Start: 2021-08-23 | End: 2021-08-23 | Stop reason: HOSPADM

## 2021-08-23 RX ORDER — HYDROMORPHONE HYDROCHLORIDE 1 MG/ML
0.5 INJECTION, SOLUTION INTRAMUSCULAR; INTRAVENOUS; SUBCUTANEOUS
Status: DISCONTINUED | OUTPATIENT
Start: 2021-08-23 | End: 2021-08-23 | Stop reason: HOSPADM

## 2021-08-23 RX ORDER — LIDOCAINE HYDROCHLORIDE 20 MG/ML
INJECTION, SOLUTION EPIDURAL; INFILTRATION; INTRACAUDAL; PERINEURAL AS NEEDED
Status: DISCONTINUED | OUTPATIENT
Start: 2021-08-23 | End: 2021-08-23 | Stop reason: HOSPADM

## 2021-08-23 RX ORDER — PROPOFOL 10 MG/ML
INJECTION, EMULSION INTRAVENOUS AS NEEDED
Status: DISCONTINUED | OUTPATIENT
Start: 2021-08-23 | End: 2021-08-23 | Stop reason: HOSPADM

## 2021-08-23 RX ORDER — NALOXONE HYDROCHLORIDE 0.4 MG/ML
0.1 INJECTION, SOLUTION INTRAMUSCULAR; INTRAVENOUS; SUBCUTANEOUS
Status: DISCONTINUED | OUTPATIENT
Start: 2021-08-23 | End: 2021-08-23 | Stop reason: HOSPADM

## 2021-08-23 RX ADMIN — BUDESONIDE 500 MCG: 0.5 INHALANT RESPIRATORY (INHALATION) at 22:10

## 2021-08-23 RX ADMIN — HEPARIN SODIUM 5000 UNITS: 5000 INJECTION INTRAVENOUS; SUBCUTANEOUS at 21:00

## 2021-08-23 RX ADMIN — FAMOTIDINE 20 MG: 20 TABLET, FILM COATED ORAL at 10:17

## 2021-08-23 RX ADMIN — ARFORMOTEROL TARTRATE 15 MCG: 15 SOLUTION RESPIRATORY (INHALATION) at 07:19

## 2021-08-23 RX ADMIN — IPRATROPIUM BROMIDE AND ALBUTEROL SULFATE 3 ML: .5; 3 SOLUTION RESPIRATORY (INHALATION) at 07:19

## 2021-08-23 RX ADMIN — DILTIAZEM HYDROCHLORIDE 30 MG: 30 TABLET, FILM COATED ORAL at 10:16

## 2021-08-23 RX ADMIN — IPRATROPIUM BROMIDE AND ALBUTEROL SULFATE 3 ML: .5; 3 SOLUTION RESPIRATORY (INHALATION) at 11:34

## 2021-08-23 RX ADMIN — HEPARIN SODIUM 5000 UNITS: 5000 INJECTION INTRAVENOUS; SUBCUTANEOUS at 05:04

## 2021-08-23 RX ADMIN — IPRATROPIUM BROMIDE AND ALBUTEROL SULFATE 3 ML: .5; 3 SOLUTION RESPIRATORY (INHALATION) at 22:10

## 2021-08-23 RX ADMIN — SODIUM CHLORIDE, SODIUM LACTATE, POTASSIUM CHLORIDE, AND CALCIUM CHLORIDE 125 ML/HR: 600; 310; 30; 20 INJECTION, SOLUTION INTRAVENOUS at 22:50

## 2021-08-23 RX ADMIN — CEFEPIME HYDROCHLORIDE 2 G: 2 INJECTION, POWDER, FOR SOLUTION INTRAVENOUS at 21:00

## 2021-08-23 RX ADMIN — AMLODIPINE BESYLATE 5 MG: 5 TABLET ORAL at 10:16

## 2021-08-23 RX ADMIN — DIPHENHYDRAMINE HYDROCHLORIDE 25 MG: 50 INJECTION, SOLUTION INTRAMUSCULAR; INTRAVENOUS at 07:47

## 2021-08-23 RX ADMIN — ARFORMOTEROL TARTRATE 15 MCG: 15 SOLUTION RESPIRATORY (INHALATION) at 22:10

## 2021-08-23 RX ADMIN — Medication 10 ML: at 14:00

## 2021-08-23 RX ADMIN — Medication 10 ML: at 05:04

## 2021-08-23 RX ADMIN — IPRATROPIUM BROMIDE AND ALBUTEROL SULFATE 3 ML: .5; 3 SOLUTION RESPIRATORY (INHALATION) at 16:01

## 2021-08-23 RX ADMIN — VANCOMYCIN HYDROCHLORIDE 750 MG: 750 INJECTION, POWDER, LYOPHILIZED, FOR SOLUTION INTRAVENOUS at 10:18

## 2021-08-23 RX ADMIN — METHYLPREDNISOLONE SODIUM SUCCINATE 40 MG: 40 INJECTION, POWDER, FOR SOLUTION INTRAMUSCULAR; INTRAVENOUS at 07:47

## 2021-08-23 RX ADMIN — PROPOFOL 100 MCG/KG/MIN: 10 INJECTION, EMULSION INTRAVENOUS at 17:11

## 2021-08-23 RX ADMIN — CEFEPIME HYDROCHLORIDE 2 G: 2 INJECTION, POWDER, FOR SOLUTION INTRAVENOUS at 05:04

## 2021-08-23 RX ADMIN — LIDOCAINE HYDROCHLORIDE 80 MG: 20 INJECTION, SOLUTION INTRAVENOUS at 17:01

## 2021-08-23 RX ADMIN — PROPOFOL 50 MG: 10 INJECTION, EMULSION INTRAVENOUS at 17:01

## 2021-08-23 RX ADMIN — IPRATROPIUM BROMIDE AND ALBUTEROL SULFATE 3 ML: .5; 3 SOLUTION RESPIRATORY (INHALATION) at 00:00

## 2021-08-23 RX ADMIN — FERROUS SULFATE TAB 325 MG (65 MG ELEMENTAL FE) 325 MG: 325 (65 FE) TAB at 07:49

## 2021-08-23 RX ADMIN — IPRATROPIUM BROMIDE AND ALBUTEROL SULFATE 3 ML: .5; 3 SOLUTION RESPIRATORY (INHALATION) at 04:30

## 2021-08-23 RX ADMIN — PROPOFOL 50 MG: 10 INJECTION, EMULSION INTRAVENOUS at 17:08

## 2021-08-23 RX ADMIN — FUROSEMIDE 20 MG: 20 TABLET ORAL at 10:16

## 2021-08-23 RX ADMIN — PROPOFOL 50 MG: 10 INJECTION, EMULSION INTRAVENOUS at 17:04

## 2021-08-23 RX ADMIN — VANCOMYCIN HYDROCHLORIDE 750 MG: 750 INJECTION, POWDER, LYOPHILIZED, FOR SOLUTION INTRAVENOUS at 22:40

## 2021-08-23 RX ADMIN — FAMOTIDINE 20 MG: 20 TABLET, FILM COATED ORAL at 21:00

## 2021-08-23 RX ADMIN — SODIUM CHLORIDE, SODIUM LACTATE, POTASSIUM CHLORIDE, AND CALCIUM CHLORIDE 125 ML/HR: 600; 310; 30; 20 INJECTION, SOLUTION INTRAVENOUS at 16:45

## 2021-08-23 RX ADMIN — Medication 10 ML: at 22:41

## 2021-08-23 RX ADMIN — CEFEPIME HYDROCHLORIDE 2 G: 2 INJECTION, POWDER, FOR SOLUTION INTRAVENOUS at 13:59

## 2021-08-23 RX ADMIN — BUDESONIDE 500 MCG: 0.5 INHALANT RESPIRATORY (INHALATION) at 07:19

## 2021-08-23 NOTE — BRIEF OP NOTE
Brief Postoperative Note    Patient: Crystal Solano  YOB: 1945  MRN: [id number]    Date of Procedure: 8/23/2021     Pre-Op Diagnosis: infected left ankle and foot ulcers with exposed Achilles' Tendon    Post-Op Diagnosis: Same as preoperative diagnosis. Procedure(s):  INCISION AND DRAINAGE, DEBRIDEMENT AND APPLICATION OF STRAVIX SKIN GRAFT OF LEFT ANKLE AND FOOT  WOUND     Surgeon(s):  Flora Mas DPM    Surgical Assistant: Surg Asst-1: Frank Joyner    Anesthesia: MAC     Estimated Blood Loss (mL): Minimal    Complications: None    Specimens:   ID Type Source Tests Collected by Time Destination   1 :     [facility] 8/23/2021 1715 Pathology   1 : swab from left ankle ulcer Wound Ankle CULTURE, ANAEROBIC, CULTURE, WOUND W Gustavo Escalante 8/23/2021 1715 Microbiology        Implants:   Implant Name Type Inv.  Item Serial No.  Lot No. LRB No. Used Action   GRAFT HUM TISS [id number] CRYOPRESERVED PLCNTA TISS [employer] - [id number]  GRAFT HUM TISS [id number] CRYOPRESERVED PLCNTA TISS [employer] [id number] Left 1 Implanted       Drains:   External Urinary Catheter 08/20/21 (Active)   Site Assessment Clean, dry, & intact 08/23/21 1345   Repositioned Yes 08/23/21 1345   Perineal Care Yes 08/23/21 1345   Wick Changed Yes 08/23/21 1345   Suction Canister/Tubing Changed No 08/23/21 1345   Urine Output (mL) 150 ml 08/23/21 1345       [REMOVED] Orogastric Tube 02/26/21 (Removed)       Findings: Cellulitis/MRSA infection left ankle wound with exposed Achilles' Tendon and foot ulcers times 2    Electronically Signed by Trina Garcia DPM on 8/23/2021 at 6:06 PM

## 2021-08-23 NOTE — ANESTHESIA PREPROCEDURE EVALUATION
Relevant Problems   No relevant active problems       Anesthetic History   No history of anesthetic complications            Review of Systems / Medical History  Patient summary reviewed, nursing notes reviewed and pertinent labs reviewed    Pulmonary    COPD: severe          Pertinent negatives: No sleep apnea and smoker     Neuro/Psych   Within defined limits           Cardiovascular    Hypertension: well controlled        Dysrhythmias : atrial fibrillation      Exercise tolerance: <4 METS     GI/Hepatic/Renal             Pertinent negatives: No hiatal hernia and GERD   Endo/Other             Other Findings              Physical Exam    Airway  Mallampati: II  TM Distance: > 6 cm  Neck ROM: normal range of motion   Mouth opening: Normal     Cardiovascular    Rhythm: regular  Rate: normal         Dental    Dentition: Poor dentition  Comments: Missing most of his teeth   Pulmonary  Breath sounds clear to auscultation               Abdominal  GI exam deferred       Other Findings            Anesthetic Plan    ASA: 3  Anesthesia type: MAC          Induction: Intravenous  Anesthetic plan and risks discussed with: Patient

## 2021-08-23 NOTE — PROGRESS NOTES
Medic contacted for placement of midline. Medic informed patient needs midline for a bone scan that needs to be completed this morning.

## 2021-08-23 NOTE — PROGRESS NOTES
Patient transferred to [address] [place] [address]     08/23/21 1902   Vital Signs   Temp 98.1 °F (36.7 °C)   Temp Source Oral   Pulse (Heart Rate) 64   Resp Rate 16   BP (!) 144/56   BP 1 Location Left arm   BP Patient Position At rest   BP 1 Method Automatic   Oxygen Therapy   O2 Sat (%) 99 %   Pulse via Oximetry 64 beats per minute   O2 Device None (Room air)

## 2021-08-23 NOTE — PROGRESS NOTES
Hospitalist Progress Note-critical care note     Patient: Star Dubose MRN: [id number]  [facility]: [id number]    YOB: 1945  Age: 64 y.o. Sex: male    DOA: 8/20/2021 LOS:  LOS: 3 days            Chief complaint: OM , femur fracture , copd     Assessment/Plan         Hospital Problems  Date Reviewed: 8/22/2021        Codes Class Noted POA    Open wound of left ankle ICD-10-CM: S91.002A  ICD-9-CM: 891.0  8/20/2021 Yes        Infected wound ICD-10-CM: T14. 8XXA, L08.9  ICD-9-CM: 958.3  8/20/2021 Yes        Supplemental oxygen dependent ICD-10-CM: Z99.81  ICD-9-CM: V46.2  8/20/2021 Yes        MRSA (methicillin resistant staph aureus) culture positive ICD-10-CM: Z22.322  ICD-9-CM: V02.54  8/20/2021 Yes        Py[facility] ([facility] ICD-10-CM: M86.9  ICD-9-CM: 730.20  8/20/2021 Yes        * (Principal) Osteomyelitis of left ankle ([facility] ICD-10-CM: M86.9  ICD-9-CM: 730.27  8/20/2021 Unknown        Intercondylar fracture of femur ([facility] ICD-10-CM: O19.234L  ICD-9-CM: 821.23  7/12/2021 Yes        Age-related physical debility ICD-10-CM: R54  ICD-9-CM: 998  7/8/2019 Unknown        Chronic renal disease, stage 3, moderately decreased glomerular filtration rate between 30-59 mL/min/1.73 square meter (HCC) ICD-10-CM: N18.30  ICD-9-CM: 585.3  7/20/2018 Yes        Chronic obstructive pulmonary disease ([facility] ICD-10-CM: J44.9  ICD-9-CM: 496  4/20/2018 Unknown                Dre Park is a 64 y. o. male with history of multiple medical problems including COPD with oxygen requirement, chronic respiratory failure with hypoxia requiring oxygen, paroxysmal atrial fibrillation, hypertension, chronic kidney disease stage III, history of prostate cancer and osteoporosis admitted for infected left ankle wound that is MRSA+ and is likely has bone involvement c/w osteomyelitis, left femur fracture-poa      Infected left ankle wound/Oseomyelitis: Continue IV vancomycin and cefepime  Dr. Hilaria Chavez on board .  Bone scan ordered-not done  Wound care consult  Possible will be debridement per Dr. Claudia Biswas   On vanc and cefepime   Id consult        Left femur fracture on 07/12:   F/u with  Dr. Kelly Schultz and decided medical tx   Will consult to ortho.     COPD with chronic respiratory failure with hypoxia requiring oxygen:   Resume home respiratory medications include DuoNebs every 4 hours  Oxygen supplementation with, requires 2 L at home, to keep at 92%  Will challenge for procedure      Paroxysmal atrial fibrillation:   On subcutaneous Lovenox at home for DVT prophylaxis. Resume Cardizem     Hypertension: Controlled per current regimen      Chronic kidney disease stage III :   Continue monitoring     Will have pvl bilateral to r/o dvt     Prognosis is guarded. Put palliative care consult    rrt called AM due to \"itchiness\"  Disposition :tbd,   Review of systems:    General: No fevers or chills. Cardiovascular: No chest pain or pressure. No palpitations. Pulmonary: No shortness of breath. Gastrointestinal: No nausea, vomiting. Msk: pain in ankle and hip     Vital signs/Intake and Output:  Visit Vitals  /67   Pulse 77   Temp 98.1 °F (36.7 °C)   Resp 20   Ht 5' 9.02\" (1.753 m)   Wt 87.1 kg (192 lb)   SpO2 98%   BMI 28.34 kg/m²     Current Shift:  08/23 0701 - 08/23 1900  In: -   Out: 650 [Urine:650]  Last three shifts:  08/21 1901 - 08/23 0700  In: 20 [P.O.:20]  Out: 1500 [Urine:1500]    Physical Exam:  General: WD, WN. Alert, cooperative, no acute distress    HEENT: NC, Atraumatic. PERRLA, anicteric sclerae. Lungs: CTA Bilaterally. No Wheezing/Rhonchi/Rales. Heart:  Regular  rhythm,  No murmur, No Rubs, No Gallops  Abdomen: Soft, Non distended, Non tender. +Bowel sounds,   Extremities: No c/c,left ankle wrapped, rt leg rom limited due to the pain   Psych:   Not anxious or agitated. Neurologic:  No acute neurological deficit.              Labs: Results:       Chemistry Recent Labs     08/23/21  0545 08/22/21  0501 08/21/21  0548   GLU 96 110* 108*    140 140   K 3.4* 3.8 3.9    105 107   CO2 26 27 27   BUN 26* 30* 34*   CREA 1.02 1.21 1.02   CA 8.6 8.6 8.6   AGAP 7 8 6   BUCR 25* 25* 33*      CBC w/Diff Recent Labs     08/23/21  0545 08/22/21  0535 08/21/21  0548   WBC 10.9 10.1 9.8   RBC 3.12* 3.26* 3.25*   HGB 8.3* 8.9* 8.8*   HCT 26.4* 29.1* 28.3*    288 278      Cardiac Enzymes No results for input(s): CPK, CKND1, WILLARD in the last 72 hours. No lab exists for component: CKRMB, TROIP   Coagulation No results for input(s): PTP, INR, APTT, INREXT, INREXT in the last 72 hours. Lipid Panel No results found for: CHOL, CHOLPOCT, CHOLX, CHLST, CHOLV, 104223, HDL, HDLP, LDL, LDLC, DLDLP, 979003, VLDLC, VLDL, TGLX, TRIGL, TRIGP, TGLPOCT, CHHD, CHHDX   BNP No results for input(s): BNPP in the last 72 hours. Liver Enzymes No results for input(s): TP, ALB, TBIL, AP in the last 72 hours. No lab exists for component: SGOT, GPT, DBIL   Thyroid Studies Lab Results   Component Value Date/Time    TSH 3.06 04/05/2021 01:30 PM        Procedures/imaging: see electronic medical records for all procedures/Xrays and details which were not copied into this note but were reviewed prior to creation of Plan    XR FEMUR LT 2 V    Result Date: 8/21/2021  EXAM: XR FEMUR LT 2 V CLINICAL INDICATION/HISTORY: Left leg pain  COMPARISON: None. TECHNIQUE: AP and lateral views of the left femur were obtained. _______________ FINDINGS: Intact left hip arthroplasty. Subacute appearing, nondisplaced spiral fracture extends through the diaphysis and distal metaphysis of the left femur with mild adjacent callus formation. Prominent lateral left hip and proximal left thigh soft tissue swelling and curvilinear density, concerning for focal fluid collection. No acute fracture. _______________     Subacute-appearing, nondisplaced distal left femoral fracture with ongoing osseous remodeling.  Prominent left hip and proximal left thigh soft swelling with more focal curvilinear fluid collection, possible phlegmonous collection or focal soft tissue abscess. XR TIB/FIB LT    Result Date: 8/20/2021  EXAM: XR ANKLE LT MIN 3 V, XR FOOT LT AP/LAT, XR TIB/FIB LT CLINICAL INDICATION/HISTORY: left lower leg wound, left foot/posterior ankle wound   > Additional: None. COMPARISON: None. TECHNIQUE: 2 views of the left tibia-fibula. 3 views of the left ankle. 2 views of the left foot. _______________ FINDINGS: Diffuse osseous mineralization. Possible lucency in the posterior distal tibia versus osteopenia with overlying ulcer, cannot exclude osteomyelitis. Additional nonspecific patchy sclerosis in the calcaneus, reflect chronic infection. No evidence of acute fracture or dislocation. _______________     Possible lucency in the posterior distal tibia versus osteopenia with overlying ulcer, cannot exclude osteomyelitis. Correlate with MRI. XR ANKLE LT MIN 3 V    Result Date: 8/20/2021  EXAM: XR ANKLE LT MIN 3 V, XR FOOT LT AP/LAT, XR TIB/FIB LT CLINICAL INDICATION/HISTORY: left lower leg wound, left foot/posterior ankle wound   > Additional: None. COMPARISON: None. TECHNIQUE: 2 views of the left tibia-fibula. 3 views of the left ankle. 2 views of the left foot. _______________ FINDINGS: Diffuse osseous mineralization. Possible lucency in the posterior distal tibia versus osteopenia with overlying ulcer, cannot exclude osteomyelitis. Additional nonspecific patchy sclerosis in the calcaneus, reflect chronic infection. No evidence of acute fracture or dislocation. _______________     Possible lucency in the posterior distal tibia versus osteopenia with overlying ulcer, cannot exclude osteomyelitis. Correlate with MRI. XR FOOT LT AP/LAT    Result Date: 8/20/2021  EXAM: XR ANKLE LT MIN 3 V, XR FOOT LT AP/LAT, XR TIB/FIB LT CLINICAL INDICATION/HISTORY: left lower leg wound, left foot/posterior ankle wound   > Additional: None. COMPARISON: None.  TECHNIQUE: 2 views of the left tibia-fibula. 3 views of the left ankle. 2 views of the left foot. _______________ FINDINGS: Diffuse osseous mineralization. Possible lucency in the posterior distal tibia versus osteopenia with overlying ulcer, cannot exclude osteomyelitis. Additional nonspecific patchy sclerosis in the calcaneus, reflect chronic infection. No evidence of acute fracture or dislocation. _______________     Possible lucency in the posterior distal tibia versus osteopenia with overlying ulcer, cannot exclude osteomyelitis. Correlate with MRI.       Enoc Domínguez MD

## 2021-08-23 NOTE — H&P
Date of Surgery Update:  Keiko Hernandez was seen and examined. History and physical has been reviewed. The patient has been examined.  There have been no significant clinical changes since the completion of the originally dated History and Physical.    Signed By: Darrius Zamarripa DPM     August 23, 2021 4:04 PM

## 2021-08-23 NOTE — ANESTHESIA POSTPROCEDURE EVALUATION
Post-Anesthesia Evaluation and Assessment    Cardiovascular Function/Vital Signs  Visit Vitals  BP (!) 141/63   Pulse 74   Temp 36.7 °C (98.1 °F)   Resp 18   Ht 5' 9.02\" (1.753 m)   Wt 87.1 kg (192 lb)   SpO2 100%   BMI 28.34 kg/m²       Patient is status post Procedure(s):  INCISION AND DRAINAGE, DEBRIDEMENT AND APPLICATION OF STRAVIX SKIN GRAFT OF LEFT ANKLE AND FOOT  WOUND . Nausea/Vomiting: Controlled. Postoperative hydration reviewed and adequate. Pain:  Pain Scale 1: Numeric (0 - 10) (08/23/21 1820)  Pain Intensity 1: 0 (08/23/21 1820)   Managed. Neurological Status: At baseline. Mental Status and Level of Consciousness: Baseline and appropriate for discharge. Pulmonary Status:   O2 Device: Nasal cannula (08/23/21 1810)   Adequate oxygenation and airway patent. Complications related to anesthesia: None    Post-anesthesia assessment completed. No concerns. Patient has met all discharge requirements.     Signed By: Tristan Abad MD    August 23, 2021

## 2021-08-23 NOTE — PROGRESS NOTES
Podiatry:  Patient is status post left foot and ankle wound I&D debridement with application of Stravix grafts. He completed the bone scan that was negative for distal tibial osteomyelitis. He is non-weight bearing left leg/foot. Wound care will apply a Wound VAC left ankle on Wednesday morning. I spoke with Dr. Polo Conte this morning who stated that Mr. Regulo Spicer left femur fracture was stable enough to take to the OR with gentle transfer. He stated that his left leg brace needs to be shortened to resolve the rubbing/ulcer issue causing his Achilles' Tendon trauma. Patient does not need long term IV antibiotics for OM and we will follow up with him in the [facility] wound care center after discharge.

## 2021-08-23 NOTE — PERIOP NOTES
TRANSFER - OUT REPORT:    Verbal report given to Krysta TUCKER(name) on Shannen Gasper  being transferred to [address] [place] [address](unit) for routine post - op       Report consisted of patients Situation, Background, Assessment and   Recommendations(SBAR). Information from the following report(s) SBAR, OR Summary, Procedure Summary, Intake/Output and MAR was reviewed with the receiving nurse. Lines:   Peripheral IV 08/21/21 Right Arm (Active)   Site Assessment Clean, dry, & intact 08/23/21 1830   Phlebitis Assessment 0 08/23/21 1830   Infiltration Assessment 0 08/23/21 1830   Dressing Status Clean, dry, & intact 08/23/21 1830   Dressing Type Transparent;Tape 08/23/21 1830   Hub Color/Line Status Pink; Infusing 08/23/21 1830   Action Taken Open ports on tubing capped 08/23/21 1345   Alcohol Cap Used Yes 08/23/21 1345        Opportunity for questions and clarification was provided.       Patient transported with:   O2 @ 2 liters  Registered Nurse  [facility]

## 2021-08-23 NOTE — PROGRESS NOTES
1345  Bedside and Verbal shift change report given to GREGORIO Zaragoza (oncoming nurse) by VINCE Valentine RN (offgoing nurse). Report included the following information SBAR, Kardex, OR Summary, Intake/Output and MAR.    1415  Shift assessment completed. Pt AOX4 2L NC. Lung sounds diminished, bowel sounds active. Pt has a wound to the left ankle that is enforced with ace wrap dressing that has slight breakthrough drainage. Pt has flaky skin with scattered bruising but skin is otherwise intact. Pt has 49W Right Cephalic that is CDI. Pt has a condom catheter that is patent and draining. Pt denies any reports of pain, discomfort, or n/v. Pt remains NPO.     1440  Pt left the floor for Nuclear Med.    3917  TRANSFER - OUT REPORT:    Verbal report given to Giovany Ramos on Crystal Solano being transferred to OR for ordered procedure       Report consisted of patients Situation, Background, Assessment and   Recommendations(SBAR). Information from the following report(s) SBAR, Kardex, [facility] and Recent Results was reviewed with the receiving nurse. Lines:   Peripheral IV 08/21/21 Right Arm (Active)   Site Assessment Clean, dry, & intact 08/23/21 0000   Phlebitis Assessment 0 08/23/21 0000   Infiltration Assessment 0 08/23/21 0000   Dressing Status Clean, dry, & intact 08/23/21 0000   Dressing Type Transparent 08/23/21 0000   Hub Color/Line Status Infusing 08/23/21 0000   Action Taken Open ports on tubing capped 08/23/21 0000   Alcohol Cap Used Yes 08/23/21 0000        Opportunity for questions and clarification was provided. Patient transported with:   O2 @ 2 liters  Tech    Pt titrated to RA and tolerating well. Pt has surgical wound to left ankle that is enforced with abd padding, kerlex, and an ace wrap that is CDI. Pt denies any reports of pain, discomfort, or n/v.      Patient Vitals for the past 4 hrs:   Temp Pulse Resp BP SpO2   08/23/21 1902 98.1 °F (36.7 °C) 64 16 (!) 144/56 99 %   08/23/21 1840 -- 76 21 (!) 137/56 100 %   08/23/21 1835 -- 75 22 (!) 129/54 100 %   08/23/21 1830 -- 75 20 (!) 141/63 100 %   08/23/21 1825 -- 75 22 (!) 143/62 100 %   08/23/21 1819 98.1 °F (36.7 °C) 74 18 (!) 141/63 100 %   08/23/21 1815 -- 79 14 (!) 143/64 99 %   08/23/21 1810 -- 77 15 (!) 140/76 100 %   08/23/21 1805 98.1 °F (36.7 °C) 68 16 130/61 98 %   08/23/21 1626 99 °F (37.2 °C) 72 18 (!) 140/60 100 %         1950  Bedside and Verbal shift change report given by GREGORIO Pierre, RN (off going nurse) to CHRYSTAL Morris RN (on coming nurse). Report included the following information SBAR, Kardex, OR Summary, Intake/Output and MAR.

## 2021-08-23 NOTE — PROGRESS NOTES
D/C Plan:  [address] [place] [address]     Noted possible OR deep debridement with Stravix  grafting and wound VAC, of the left ankle ulcers later today if possible.   CM to continue to follow and assist.    Care Management Interventions  Mode of Transport at Discharge: BLS  Transition of Care Consult (CM Consult): Discharge Planning, SNF  Health Maintenance Reviewed: Yes  Current Support Network: Lives with Spouse, [address] [place] [address] Ave  The Plan for Transition of Care is Related to the Following Treatment Goals : Return to [address] [place] [address]  The Patient and/or Patient Representative was Provided with a Choice of Provider and Agrees with the Discharge Plan?: Yes  Name of the Patient Representative Who was Provided with a Choice of Provider and Agrees with the Discharge Plan: spouse  Freedom of Choice List was Provided with Basic Dialogue that Supports the Patient's Individualized Plan of Care/Goals, Treatment Preferences and Shares the Quality Data Associated with the Providers?: Yes  Discharge Location  Discharge Placement: Skilled nursing facility ([address] [place] [address] )

## 2021-08-24 ENCOUNTER — APPOINTMENT (OUTPATIENT)
Dept: VASCULAR SURGERY | Age: 76
DRG: 464 | End: 2021-08-24
Attending: HOSPITALIST
Payer: MEDICARE

## 2021-08-24 LAB
ANION GAP SERPL CALC-SCNC: 8 MMOL/L (ref 3–18)
BUN SERPL-MCNC: 26 MG/DL (ref 7–18)
BUN/CREAT SERPL: 26 (ref 12–20)
CALCIUM SERPL-MCNC: 8.7 MG/DL (ref 8.5–10.1)
CHLORIDE SERPL-SCNC: 108 MMOL/L (ref 100–111)
CO2 SERPL-SCNC: 25 MMOL/L (ref 21–32)
CREAT SERPL-MCNC: 0.99 MG/DL (ref 0.6–1.3)
GLUCOSE SERPL-MCNC: 105 MG/DL (ref 74–99)
POTASSIUM SERPL-SCNC: 3.7 MMOL/L (ref 3.5–5.5)
SODIUM SERPL-SCNC: 141 MMOL/L (ref 136–145)

## 2021-08-24 PROCEDURE — 74011000250 HC RX REV CODE- 250: Performed by: FAMILY MEDICINE

## 2021-08-24 PROCEDURE — 74011250637 HC RX REV CODE- 250/637: Performed by: FAMILY MEDICINE

## 2021-08-24 PROCEDURE — 74011250636 HC RX REV CODE- 250/636: Performed by: FAMILY MEDICINE

## 2021-08-24 PROCEDURE — 74011250637 HC RX REV CODE- 250/637: Performed by: INTERNAL MEDICINE

## 2021-08-24 PROCEDURE — 93970 EXTREMITY STUDY: CPT

## 2021-08-24 PROCEDURE — 36415 COLL VENOUS BLD VENIPUNCTURE: CPT

## 2021-08-24 PROCEDURE — 77010033678 HC OXYGEN DAILY

## 2021-08-24 PROCEDURE — 94760 N-INVAS EAR/PLS OXIMETRY 1: CPT

## 2021-08-24 PROCEDURE — 74011250636 HC RX REV CODE- 250/636: Performed by: HOSPITALIST

## 2021-08-24 PROCEDURE — 74011250636 HC RX REV CODE- 250/636: Performed by: PODIATRIST

## 2021-08-24 PROCEDURE — 65270000029 HC RM PRIVATE

## 2021-08-24 PROCEDURE — 94640 AIRWAY INHALATION TREATMENT: CPT

## 2021-08-24 PROCEDURE — 99222 1ST HOSP IP/OBS MODERATE 55: CPT | Performed by: NURSE PRACTITIONER

## 2021-08-24 PROCEDURE — 80048 BASIC METABOLIC PNL TOTAL CA: CPT

## 2021-08-24 RX ORDER — CIPROFLOXACIN 500 MG/1
500 TABLET ORAL EVERY 12 HOURS
Status: DISCONTINUED | OUTPATIENT
Start: 2021-08-24 | End: 2021-08-26 | Stop reason: HOSPADM

## 2021-08-24 RX ORDER — DOXYCYCLINE 100 MG/1
100 CAPSULE ORAL EVERY 12 HOURS
Status: DISCONTINUED | OUTPATIENT
Start: 2021-08-24 | End: 2021-08-26 | Stop reason: HOSPADM

## 2021-08-24 RX ADMIN — DILTIAZEM HYDROCHLORIDE 30 MG: 30 TABLET, FILM COATED ORAL at 09:49

## 2021-08-24 RX ADMIN — AMLODIPINE BESYLATE 5 MG: 5 TABLET ORAL at 09:49

## 2021-08-24 RX ADMIN — CIPROFLOXACIN 500 MG: 500 TABLET, FILM COATED ORAL at 15:15

## 2021-08-24 RX ADMIN — TAMSULOSIN HYDROCHLORIDE 0.4 MG: 0.4 CAPSULE ORAL at 17:43

## 2021-08-24 RX ADMIN — CIPROFLOXACIN 500 MG: 500 TABLET, FILM COATED ORAL at 22:00

## 2021-08-24 RX ADMIN — DOXYCYCLINE 100 MG: 100 CAPSULE ORAL at 15:15

## 2021-08-24 RX ADMIN — DOXYCYCLINE 100 MG: 100 CAPSULE ORAL at 22:00

## 2021-08-24 RX ADMIN — HEPARIN SODIUM 5000 UNITS: 5000 INJECTION INTRAVENOUS; SUBCUTANEOUS at 22:00

## 2021-08-24 RX ADMIN — CEFEPIME HYDROCHLORIDE 2 G: 2 INJECTION, POWDER, FOR SOLUTION INTRAVENOUS at 04:17

## 2021-08-24 RX ADMIN — HEPARIN SODIUM 5000 UNITS: 5000 INJECTION INTRAVENOUS; SUBCUTANEOUS at 15:15

## 2021-08-24 RX ADMIN — IPRATROPIUM BROMIDE AND ALBUTEROL SULFATE 3 ML: .5; 3 SOLUTION RESPIRATORY (INHALATION) at 07:10

## 2021-08-24 RX ADMIN — FAMOTIDINE 20 MG: 20 TABLET, FILM COATED ORAL at 09:49

## 2021-08-24 RX ADMIN — SODIUM CHLORIDE, SODIUM LACTATE, POTASSIUM CHLORIDE, AND CALCIUM CHLORIDE 125 ML/HR: 600; 310; 30; 20 INJECTION, SOLUTION INTRAVENOUS at 09:49

## 2021-08-24 RX ADMIN — ARFORMOTEROL TARTRATE 15 MCG: 15 SOLUTION RESPIRATORY (INHALATION) at 07:10

## 2021-08-24 RX ADMIN — FUROSEMIDE 20 MG: 20 TABLET ORAL at 09:49

## 2021-08-24 RX ADMIN — IPRATROPIUM BROMIDE AND ALBUTEROL SULFATE 3 ML: .5; 3 SOLUTION RESPIRATORY (INHALATION) at 03:24

## 2021-08-24 RX ADMIN — FERROUS SULFATE TAB 325 MG (65 MG ELEMENTAL FE) 325 MG: 325 (65 FE) TAB at 09:49

## 2021-08-24 RX ADMIN — BUDESONIDE 500 MCG: 0.5 INHALANT RESPIRATORY (INHALATION) at 07:10

## 2021-08-24 RX ADMIN — Medication 10 ML: at 23:02

## 2021-08-24 RX ADMIN — VANCOMYCIN HYDROCHLORIDE 750 MG: 750 INJECTION, POWDER, LYOPHILIZED, FOR SOLUTION INTRAVENOUS at 09:49

## 2021-08-24 RX ADMIN — IPRATROPIUM BROMIDE AND ALBUTEROL SULFATE 3 ML: .5; 3 SOLUTION RESPIRATORY (INHALATION) at 23:46

## 2021-08-24 RX ADMIN — IPRATROPIUM BROMIDE AND ALBUTEROL SULFATE 3 ML: .5; 3 SOLUTION RESPIRATORY (INHALATION) at 16:43

## 2021-08-24 RX ADMIN — ARFORMOTEROL TARTRATE 15 MCG: 15 SOLUTION RESPIRATORY (INHALATION) at 21:20

## 2021-08-24 RX ADMIN — IPRATROPIUM BROMIDE AND ALBUTEROL SULFATE 3 ML: .5; 3 SOLUTION RESPIRATORY (INHALATION) at 11:29

## 2021-08-24 RX ADMIN — FERROUS SULFATE TAB 325 MG (65 MG ELEMENTAL FE) 325 MG: 325 (65 FE) TAB at 17:43

## 2021-08-24 RX ADMIN — FAMOTIDINE 20 MG: 20 TABLET, FILM COATED ORAL at 22:00

## 2021-08-24 RX ADMIN — IPRATROPIUM BROMIDE AND ALBUTEROL SULFATE 3 ML: .5; 3 SOLUTION RESPIRATORY (INHALATION) at 21:20

## 2021-08-24 RX ADMIN — HEPARIN SODIUM 5000 UNITS: 5000 INJECTION INTRAVENOUS; SUBCUTANEOUS at 04:17

## 2021-08-24 RX ADMIN — BUDESONIDE 500 MCG: 0.5 INHALANT RESPIRATORY (INHALATION) at 21:20

## 2021-08-24 RX ADMIN — DIPHENHYDRAMINE HYDROCHLORIDE 25 MG: 50 INJECTION, SOLUTION INTRAMUSCULAR; INTRAVENOUS at 04:16

## 2021-08-24 NOTE — CONSULTS
[facility]  (A Division of [address] [place] [address])                                                                                                                       Lillian Pavon MD  Office #:     [phone]   Office Fax: [phone]     Date of Admission: 8/20/2021Date of Note: 8/24/2021      Reason for Referral: I was asked to see patient by Dr Chastity Monroe for evaluation and antibiotic management of L foot wound infection. Thank you for involving me in the care of this patient. Please do not hesitate to contact me on the above number if question or concern. Current Antimicrobials:    Prior Antimicrobials:  Vanco/ cefepime 8/20 to date   Doxycycline 100 BID X5 days POA       Assessment- ID related:  --------------------------------------------------------------------------  · Left foot ulcers- no evidence of OM on bone scan  · MRSA + Pseudmonas from wound culture 8/11, WCX 8/20 and 8/23- Pending    Other Medical Issues- Mx per respective team:  -left fem fracture- managed conservativel with brace  -COPD  -hx of brain aneruism- not able to do MRI of L foot    Past ID Issues:  -hx of MRSA PNA-4/2021   Recommendation for ID issues I am following:  ------------------------------------------------------------------------------  -Switch ABX to oral-  Cipro 500 mg BID and doxycycline to complete total of 10 days from bridement- 8/23/21, can discharge on oral ABX        Main treatment --wound care until healed      Not much to add from ID side. Will sign off. Please call as needed       HPI:  Harrison Cain is 64 y.o. WHITE/NON- male with previous co-morbidities as listed in PMH COPD on home O2, fracture of L femur had brace on. He developed skin break on L ankle/foot area from the friction. Getting wound care and he had WCX done on 8/11- with pseudomonas and MRSA. While on PO doxy at SNF, was felt he had more drainage and came in to ED.  No F/C/worsening of chest issues. X-ray of L foot with osteopenia/ concern for OM on distal tibia. Unable to do MRI due to brain aneurism and had bone scan, which was negative for OM. Cefepime/Vanco since admission, debridement and grafting done by Dr Karin Barahona on 8/23/21.          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 ([address] 08/23/2021    Open wound of left ankle 08/20/2021    Infected wound 08/20/2021    Supplemental oxygen dependent 08/20/2021    MRSA (methicillin resistant staph aureus) culture positive 08/20/2021    Pyogenic inflammation of bone ([address] 08/20/2021    Osteomyelitis of left ankle ([address] 08/20/2021    Intercondylar fracture of femur ([address] 07/12/2021    Age-related physical debility 07/08/2019    Chronic renal disease, stage 3, moderately decreased glomerular filtration rate between 30-59 mL/min/1.73 square meter ([address] 07/20/2018    Chronic obstructive pulmonary disease ([address] 04/20/2018     Past Medical History:   Diagnosis Date    Brain aneurysm     Cancer ([address]     prostate    COPD (chronic obstructive pulmonary disease) ([address]     Hypertension     Nocturia     On home oxygen therapy     Pneumonia     Prostate neoplasm     Radiation effect      Past Surgical History:   Procedure Laterality Date    HX HERNIA REPAIR      HX HIP ARTHROSCOPY  04/09/2021     Family History   Problem Relation Age of Onset    Heart Disease Mother      Social History     Socioeconomic History    Marital status:      Spouse name: Not on file    Number of children: Not on file    Years of education: Not on file    Highest education level: Not on file   Occupational History    Not on file   Tobacco Use    Smoking status: Former Smoker     Types: Cigarettes    Smokeless tobacco: Never Used   Substance and Sexual Activity    Alcohol use: No    Drug use: Never    Sexual activity: Not on file   Other Topics Concern    Not on file   Social History Narrative    Not on file     Social Determinants of Health     Financial Resource Strain:     Difficulty of Paying Living Expenses:    Food Insecurity:     Worried About Running Out of Food in the Last Year:     [address] in the Last Year:    Transportation Needs:     Lack of Transportation (Medical):  Lack of Transportation (Non-Medical):    Physical Activity:     Days of Exercise per Week:     Minutes of Exercise per Session:    Stress:     Feeling of Stress :    Social Connections:     Frequency of Communication with Friends and Family:     Frequency of Social Gatherings with Friends and Family:     Attends Evangelical Services:     Active Member of Clubs or Organizations:     Attends Club or Organization Meetings:     Marital Status:    Intimate Partner Violence:     Fear of Current or Ex-Partner:     Emotionally Abused:     Physically Abused:     Sexually Abused:         Allergies:  Aspirin and Bactrim [sulfamethoxazole-trimethoprim]     Medications:  Current Facility-Administered Medications   Medication Dose Route Frequency    diphenhydrAMINE (BENADRYL) injection 25 mg  25 mg IntraVENous Q6H PRN    lactated Ringers infusion  125 mL/hr IntraVENous CONTINUOUS    budesonide (PULMICORT) 500 mcg/2 ml nebulizer suspension  500 mcg Nebulization BID RT    arformoteroL (BROVANA) neb solution 15 mcg  15 mcg Nebulization BID RT    cefepime (MAXIPIME) 2 g in sterile water (preservative free) 10 mL IV syringe  2 g IntraVENous Q8H    Vancomycin Pharmacy to Dose  1 Each Other Rx Dosing/Monitoring    vancomycin (VANCOCIN) 750 mg in 0.9% sodium chloride 250 mL (VIAL-MATE)  750 mg IntraVENous Q12H    sodium chloride (NS) flush 5-40 mL  5-40 mL IntraVENous Q8H    sodium chloride (NS) flush 5-40 mL  5-40 mL IntraVENous PRN    acetaminophen (TYLENOL) tablet 650 mg  650 mg Oral Q6H PRN    Or    acetaminophen (TYLENOL) suppository 650 mg  650 mg Rectal Q6H PRN    polyethylene glycol (MIRALAX) packet 17 g  17 g Oral DAILY PRN    ondansetron (ZOFRAN ODT) tablet 4 mg  4 mg Oral Q8H PRN    Or    ondansetron (ZOFRAN) injection 4 mg  4 mg IntraVENous Q6H PRN    famotidine (PEPCID) tablet 20 mg  20 mg Oral BID    heparin (porcine) injection 5,000 Units  5,000 Units SubCUTAneous Q8H    alum-mag hydroxide-simeth (MYLANTA) oral suspension 15 mL  15 mL Oral Q6H PRN    amLODIPine (NORVASC) tablet 5 mg  5 mg Oral DAILY    polyvinyl alcohol (LIQUIFILM TEARS) 1.4 % ophthalmic solution 2 Drop  2 Drop Both Eyes PRN    dilTIAZem IR (CARDIZEM) tablet 30 mg  30 mg Oral DAILY    ferrous sulfate tablet 325 mg  325 mg Oral BID WITH MEALS    furosemide (LASIX) tablet 20 mg  20 mg Oral DAILY    tamsulosin (FLOMAX) capsule 0.4 mg  0.4 mg Oral QPM    albuterol-ipratropium (DUO-NEB) 2.5 MG-0.5 MG/3 ML  3 mL Nebulization Q4H        ROS:  A comprehensive review of systems was negative. Physical Exam:    Temp (24hrs), Av.1 °F (36.7 °C), Min:97.6 °F (36.4 °C), Max:99 °F (37.2 °C)    Visit Vitals  BP (!) 131/55 (BP 1 Location: Left upper arm, BP Patient Position: At rest;Supine)   Pulse 84   Temp 98.1 °F (36.7 °C)   Resp 24   Ht 5' 9.02\" (1.753 m)   Wt 87.1 kg (192 lb)   SpO2 100%   BMI 28.34 kg/m²          GEN: WDWN, not in resp distress. HEENT: Unicteric. EOMI intact  CHEST: Non laboured breathing  CVS:RRR, no mur/gallop  ABD: Obese/soft. Non tender. AMANDA: Deferred  EXT: No apparent swelling or redness on UE/LE joints. Skin: Dry and intact. No rash, no redness. CNS: A, OX3. Moves all extremity. CN grossly ok.       Microbiology  All Micro Results     Procedure Component Value Units Date/Time    CULTURE, Edwina Moulding [[id number]] Collected: 21    Order Status: Completed Specimen: Wound from Ankle Updated: 21    CULTURE, ANAEROBIC [[id number]] Collected: 21    Order Status: Completed Specimen: Ankle Updated: 21    CULTURE, WOUND Nicole Combe STAIN [[id number]]  (Abnormal) Collected: 21 1245    Order Status: Completed Specimen: Wound from Foot Updated: 21 1104     Special Requests: NO SPECIAL REQUESTS        GRAM STAIN OCCASIONAL WBCS SEEN               3+ GRAM POSITIVE COCCI IN PAIRS            3+ GRAM NEGATIVE RODS        Culture result: HEAVY PSEUDOMONAS SPECIES               HEAVY ENTEROCOCCUS SPECIES                  MODERATE 2ND GRAM NEGATIVE LALO                  CHECKING FOR POSSIBLE  3RD GRAM NEGATIVE LALO        CHECKING FOR POSSIBLE  OTHER ORGANISMS       CULTURE, BLOOD [[id number]] Collected: 21 141    Order Status: Completed Specimen: Blood Updated: 21 0724     Special Requests: NO SPECIAL REQUESTS        Culture result: NO GROWTH 3 DAYS       CULTURE, BLOOD [[id number]] Collected: 21 141    Order Status: Completed Specimen: Blood Updated: 21     Special Requests: NO SPECIAL REQUESTS        Culture result: NO GROWTH 3 DAYS              Lab results:    Chemistry  Recent Labs     21  0335 21  0545 21  0535   * 96 110*    141 140   K 3.7 3.4* 3.8    108 105   CO2 25 26 27   BUN 26* 26* 30*   CREA 0.99 1.02 1.21   CA 8.7 8.6 8.6   AGAP 8 7 8   BUCR 26* 25* 25*     CBC w/ Diff  Recent Labs     21  0545 21  0535   WBC 10.9 10.1   RBC 3.12* 3.26*   HGB 8.3* 8.9*   HCT 26.4* 29.1*    288       Imagin/20- L ankle  Possible lucency in the posterior distal tibia versus osteopenia with overlying  ulcer, cannot exclude osteomyelitis. Correlate with MRI. L distal leg  Possible lucency in the posterior distal tibia versus osteopenia with overlying  ulcer, cannot exclude osteomyelitis. Correlate with MRI. L femur  Subacute-appearing, nondisplaced distal left femoral fracture with ongoing  osseous remodeling.  Prominent left hip and proximal left thigh soft swelling  with more focal curvilinear fluid collection, possible phlegmonous collection or  focal soft tissue abscess. Bone scan  1. Soft tissue findings in keeping with known ulcer. No scintigraphic findings  to suggest osteomyelitis.

## 2021-08-24 NOTE — ACP (ADVANCE CARE PLANNING)
[facility] [phone]  [phone]      Palliative Care Support: This writer, along with Brien Garcia NP, with the Palliative Care team; met with patient to offer support and to also review patient's existing POST form. Patient was resting comfortably in bed. Patient is alert and oriented. He is hard of hearing. Patient resides with his wife and has family supports. Patient has an advance medical directive (AMD) already scanned; naming his wife Neeraj Glover, LX#[phone]) as his primary decision maker and his daughter Chacha Cuevas, BE#[phone]) as his secondary decision maker. Patient's existing POST form was then reviewed with patient to determine if he wanted to update or change the POST form. Patient's scanned POST form is DNR with Full Interventions. Patient is okay with ventilation for a 2 week trial and no trach. Patient verified that those are currently his wishes and he does not want to change anything. The nurse was informed and the MD was informed of this conversation. The POST form is available and scanned in the EMR. At this time, patient will remain a DNR with Full Interventions and okay with ventilation for a 2 week trial period and NO trach. Recommendations: The Palliative Care team will continue to offer support to patient and his family; at this time. Tapan Orr., [facility] [place]   RANDOLPH#[phone]

## 2021-08-24 NOTE — PROGRESS NOTES
Patient at this time will not replace knee brace onto left leg. Will plan to just use a short knee immobilizer at this point and patient can be WBAT with walker and max assist once he is cleared. Follow up with me in office as scheduled.

## 2021-08-24 NOTE — PROGRESS NOTES
Bedside and Verbal shift change report given to CAITLIN Brock (oncoming nurse) by  Davene Bence RN (offgoing nurse).  Report included the following information SBAR, Kardex, Intake/Output, MAR, Recent Results

## 2021-08-24 NOTE — PROGRESS NOTES
Problem: Risk for Spread of Infection  Goal: Prevent transmission of infectious organism to others  Description: Prevent the transmission of infectious organisms to other patients, staff members, and visitors. Outcome: Progressing Towards Goal     Problem: Patient Education:  Go to Education Activity  Goal: Patient/Family Education  Outcome: Progressing Towards Goal     Problem: General Infection Care Plan (Adult and Pediatric)  Goal: Improvement in signs and symptoms of infection  Outcome: Progressing Towards Goal  Goal: *Optimize nutritional status  Outcome: Progressing Towards Goal     Problem: Patient Education: Go to Patient Education Activity  Goal: Patient/Family Education  Outcome: Progressing Towards Goal     Problem: Falls - Risk of  Goal: *Absence of Falls  Description: Document Darell Fall Risk and appropriate interventions in the flowsheet. Outcome: Progressing Towards Goal  Note: Fall Risk Interventions:            Medication Interventions: Assess postural VS orthostatic hypotension    Elimination Interventions: Call light in reach    History of Falls Interventions: Investigate reason for fall         Problem: Patient Education: Go to Patient Education Activity  Goal: Patient/Family Education  Outcome: Progressing Towards Goal     Problem: Pressure Injury - Risk of  Goal: *Prevention of pressure injury  Description: Document Nikos Scale and appropriate interventions in the flowsheet.   Outcome: Progressing Towards Goal  Note: Pressure Injury Interventions:  Sensory Interventions: Assess changes in LOC    Moisture Interventions: Absorbent underpads, Assess need for specialty bed    Activity Interventions: Increase time out of bed    Mobility Interventions: HOB 30 degrees or less, Pressure redistribution bed/mattress (bed type)    Nutrition Interventions: Document food/fluid/supplement intake    Friction and Shear Interventions: HOB 30 degrees or less                Problem: Patient Education: Go to Patient Education Activity  Goal: Patient/Family Education  Outcome: Progressing Towards Goal

## 2021-08-24 NOTE — PROGRESS NOTES
Vancomycin trough level= 18.00 @ 2103 on 0823/2021   No changes required presently   pharmacy shall continue to monitor and adjust based on clinical status and outcomes

## 2021-08-24 NOTE — PROGRESS NOTES
Hospitalist Progress Note-critical care note     Patient: Delphine Atwood MRN: [id number]  CSN: [id number]    YOB: 1945  Age: 64 y.o. 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 8XXA, L08.9  ICD-9-CM: 958.3  8/20/2021 Yes        Supplemental oxygen dependent ICD-10-CM: Z99.81  ICD-9-CM: V46.2  8/20/2021 Yes        MRSA (methicillin resistant staph aureus) culture positive ICD-10-CM: Z22.322  ICD-9-CM: V02.54  8/20/2021 Yes        Pyogenic inflammation of bone ([facility] ICD-10-CM: M86.9  ICD-9-CM: 730.20  8/20/2021 Yes        Intercondylar fracture of femur ([facility] ICD-10-CM: K13.264E  ICD-9-CM: 821.23  7/12/2021 Yes        Age-related physical debility ICD-10-CM: R54  ICD-9-CM: 660  7/8/2019 Unknown        Chronic renal disease, stage 3, moderately decreased glomerular filtration rate between 30-59 mL/min/1.73 square meter (HCC) ICD-10-CM: N18.30  ICD-9-CM: 585.3  7/20/2018 Yes        Chronic obstructive pulmonary disease ([facility] ICD-10-CM: J44.9  ICD-9-CM: 496  4/20/2018 Unknown                Dre Park is a 64 y. o. male with history of multiple medical problems including COPD with oxygen requirement, chronic respiratory failure with hypoxia requiring oxygen, paroxysmal atrial fibrillation, hypertension, chronic kidney disease stage III, history of prostate cancer and osteoporosis admitted for infected left ankle wound that is MRSA+ and is likely has bone involvement c/w osteomyelitis, left femur fracture-poa      Infected left ankle wound  Debridement per Dr. Isaias Logan on board . Id on board and recommend po abx for 10 days   Wound care consult  Nuclear scan not support OM        Left femur fracture on 07/12:   F/u with  Dr. Edwar Leal and decided medical tx       COPD with chronic respiratory failure with hypoxia requiring oxygen:   Resume home respiratory medications include DuoNebs every 4 hours  Oxygen supplementation with, requires 2 L at home, to keep at 92%       Paroxysmal atrial fibrillation:   On subcutaneous Lovenox at home for DVT prophylaxis. Resume Cardizem     Hypertension: Controlled per current regimen      Chronic kidney disease stage III :   Continue monitoring     Will have pvl bilateral to r/o dvt     Partial code now     Need pt/ot and placement     Subjective: feel better     Disposition :1-2 days   Review of systems:    General: No fevers or chills. Cardiovascular: No chest pain or pressure. No palpitations. Pulmonary: No shortness of breath. Gastrointestinal: No nausea, vomiting. Msk: pain is better     Vital signs/Intake and Output:  Visit Vitals  BP (!) 114/53 (BP 1 Location: Left upper arm, BP Patient Position: Sitting)   Pulse 85   Temp 97.9 °F (36.6 °C)   Resp 20   Ht 5' 9.02\" (1.753 m)   Wt 87.1 kg (192 lb)   SpO2 99%   BMI 28.34 kg/m²     Current Shift:  08/24 0701 - 08/24 1900  In: -   Out: 200 [Urine:200]  Last three shifts:  08/22 1901 - 08/24 0700  In: 720 [P.O.:20; I.V.:700]  Out: 5469 [Urine:2500]    Physical Exam:  General: WD, WN. Alert, cooperative, no acute distress    HEENT: NC, Atraumatic. PERRLA, anicteric sclerae. Lungs: CTA Bilaterally. No Wheezing/Rhonchi/Rales. Heart:  Regular  rhythm,  No murmur, No Rubs, No Gallops  Abdomen: Soft, Non distended, Non tender. +Bowel sounds,   Extremities: No c/c,left ankle wrapped, move rt leg per his own   Psych:   Not anxious or agitated. Neurologic:  No acute neurological deficit.              Labs: Results:       Chemistry Recent Labs     08/24/21  0335 08/23/21  0545 08/22/21  0535   * 96 110*    141 140   K 3.7 3.4* 3.8    108 105   CO2 25 26 27   BUN 26* 26* 30*   CREA 0.99 1.02 1.21   CA 8.7 8.6 8.6   AGAP 8 7 8   BUCR 26* 25* 25*      CBC w/Diff Recent Labs     08/23/21  0545 08/22/21  0535   WBC 10.9 10.1   RBC 3.12* 3.26*   HGB 8.3* 8.9*   HCT 26.4* 29.1*    288      Cardiac Enzymes No results for input(s): CPK, CKND1, WILLARD in the last 72 hours. No lab exists for component: CKRMB, TROIP   Coagulation No results for input(s): PTP, INR, APTT, INREXT, INREXT in the last 72 hours. Lipid Panel No results found for: CHOL, CHOLPOCT, CHOLX, CHLST, CHOLV, 601287, HDL, HDLP, LDL, LDLC, DLDLP, 071679, VLDLC, VLDL, TGLX, TRIGL, TRIGP, TGLPOCT, CHHD, CHHDX   BNP No results for input(s): BNPP in the last 72 hours. Liver Enzymes No results for input(s): TP, ALB, TBIL, AP in the last 72 hours. No lab exists for component: SGOT, GPT, DBIL   Thyroid Studies Lab Results   Component Value Date/Time    TSH 3.06 04/05/2021 01:30 PM        Procedures/imaging: see electronic medical records for all procedures/Xrays and details which were not copied into this note but were reviewed prior to creation of Plan    XR FEMUR LT 2 V    Result Date: 8/21/2021  EXAM: XR FEMUR LT 2 V CLINICAL INDICATION/HISTORY: Left leg pain  COMPARISON: None. TECHNIQUE: AP and lateral views of the left femur were obtained. _______________ FINDINGS: Intact left hip arthroplasty. Subacute appearing, nondisplaced spiral fracture extends through the diaphysis and distal metaphysis of the left femur with mild adjacent callus formation. Prominent lateral left hip and proximal left thigh soft tissue swelling and curvilinear density, concerning for focal fluid collection. No acute fracture. _______________     Subacute-appearing, nondisplaced distal left femoral fracture with ongoing osseous remodeling.  Prominent left hip and proximal left thigh soft swelling with more focal curvilinear fluid collection, possible phlegmonous collection or focal soft tissue abscess. XR TIB/FIB LT    Result Date: 8/20/2021  EXAM: XR ANKLE LT MIN 3 V, XR FOOT LT AP/LAT, XR TIB/FIB LT CLINICAL INDICATION/HISTORY: left lower leg wound, left foot/posterior ankle wound   > Additional: None. COMPARISON: None. TECHNIQUE: 2 views of the left tibia-fibula. 3 views of the left ankle. 2 views of the left foot. _______________ FINDINGS: Diffuse osseous mineralization. Possible lucency in the posterior distal tibia versus osteopenia with overlying ulcer, cannot exclude osteomyelitis. Additional nonspecific patchy sclerosis in the calcaneus, reflect chronic infection. No evidence of acute fracture or dislocation. _______________     Possible lucency in the posterior distal tibia versus osteopenia with overlying ulcer, cannot exclude osteomyelitis. Correlate with MRI. XR ANKLE LT MIN 3 V    Result Date: 8/20/2021  EXAM: XR ANKLE LT MIN 3 V, XR FOOT LT AP/LAT, XR TIB/FIB LT CLINICAL INDICATION/HISTORY: left lower leg wound, left foot/posterior ankle wound   > Additional: None. COMPARISON: None. TECHNIQUE: 2 views of the left tibia-fibula. 3 views of the left ankle. 2 views of the left foot. _______________ FINDINGS: Diffuse osseous mineralization. Possible lucency in the posterior distal tibia versus osteopenia with overlying ulcer, cannot exclude osteomyelitis. Additional nonspecific patchy sclerosis in the calcaneus, reflect chronic infection. No evidence of acute fracture or dislocation. _______________     Possible lucency in the posterior distal tibia versus osteopenia with overlying ulcer, cannot exclude osteomyelitis. Correlate with MRI. XR FOOT LT AP/LAT    Result Date: 8/20/2021  EXAM: XR ANKLE LT MIN 3 V, XR FOOT LT AP/LAT, XR TIB/FIB LT CLINICAL INDICATION/HISTORY: left lower leg wound, left foot/posterior ankle wound   > Additional: None. COMPARISON: None. TECHNIQUE: 2 views of the left tibia-fibula. 3 views of the left ankle. 2 views of the left foot. _______________ FINDINGS: Diffuse osseous mineralization. Possible lucency in the posterior distal tibia versus osteopenia with overlying ulcer, cannot exclude osteomyelitis. Additional nonspecific patchy sclerosis in the calcaneus, reflect chronic infection. No evidence of acute fracture or dislocation. _______________     Possible lucency in the posterior distal tibia versus osteopenia with overlying ulcer, cannot exclude osteomyelitis. Correlate with MRI.       Gail Estrada MD

## 2021-08-24 NOTE — CONSULTS
Palliative Medicine Consult    Patient Name: Keiko Hernandez  YOB: 1945    Date of Initial Consult: 8/24/2021  Reason for Consult: Goals of care discussions  Requesting Provider: Dr. Magnolia Ward  Primary Care Physician: Geovany Mejia MD      SUMMARY:   Keiko Hernandez is a 64 y.o. with a past history of multiple medical problems including COPD with oxygen requirement, chronic respiratory failure with hypoxia requiring oxygen, paroxysmal atrial fibrillation, hypertension, chronic kidney disease stage III, history of prostate cancer and osteoporosis with recent left femur fracture on 7/12/2021, who was admitted on 8/20/2021 from home with a diagnosis of infected left ankle wound MRSA positive, and left femur fracture present on admission. Current medical issues leading to Palliative Medicine involvement include: Support and goals of care discussions. Of note, patient is known to our team, and has a POST form on file. PALLIATIVE DIAGNOSES:   1. Goals of care discussions  2. Chronic respiratory failure with hypoxia  3. Infected left ankle wound  4. Left femur fracture  5. Advanced age/debility       PLAN:   1. Goals of care discussions: Palliative medicine team including TAMI Alcocer and I met with patient and patient's bedside. Patient is awake, alert, oriented x4. He is hard of hearing, but improved with insertion of his left hearing aid. Patient is known to our services and has a POST form on file. Reviewed current form with patient who shared that these remain his current wishes. Goals of care: Partial code: DNR in the event of cardiopulmonary arrest.  Okay with intubation in the event of respiratory decline prearrest (for up to a 2-week trial, no tracheostomy), no feeding tubes. We explained the concerns of intubation in the event of respiratory decline, and the likely possibility of difficulty ventilating/inability to wean from vent.   Discussed with patient that if he becomes more weak or wants to consider pursuing comfort measures, that hospice services can be initiated. Patient wants to continue current level of care. We will continue to follow for support. 2. Chronic respiratory failure with hypoxia: Patient with known COPD with home O2 requirement. Respiratory status at baseline on nasal cannula 2 L/min. 3. Infected left ankle wound: + MRSA. Patient contributes this to the full leg brace he has been wearing as conservative treatment for his left femur fracture 7/12/2021. He is status post debridement, podiatry following. Wound care following. ID on board and recommends p.o. antibiotics for 10 days. Nuclear scan data did not support osteomyelitis. 4.  Left femur fracture: Occurred prior to admission, hospital admission 7/12/2021 for same. Ortho has been consulted previous admission and patient had elected for conservative treatment with full leg brace and nonweightbearing for 6 weeks. 5. Advanced age/debility: Has not been out of bed for several weeks, needs assist with all ADLs except he can feed himself. Lives at home with his wife. 1. Initial consult note routed to primary continuity provider  2.  Communicated plan of care with: Palliative IDT       GOALS OF CARE / TREATMENT PREFERENCES:   [====Goals of Care====]  GOALS OF CARE: Partial code: DNR in the event of cardiopulmonary arrest.  Okay with intubation in the event of respiratory decline prearrest (for up to a 2-week trial, no tracheostomy)  Patient/Health Care Proxy Stated Goals: Prolong life      TREATMENT PREFERENCES:   Code Status: Partial Code    Advance Care Planning:  Advance Care Planning 8/21/2021   Patient's Healthcare Decision Maker is: -   Primary Decision Maker Name -   Primary Decision Maker Phone Number -   Primary Decision Maker Relationship to Patient -   Confirm Advance Directive Yes, on file   Patient Would Like to Complete Advance Directive -   Does the patient have other document types -       Medical Interventions: Full interventions (OK with intubation pre-arrest for up to a two week trial)    Artificially Administered Nutrition: No feeding tube      The palliative care team has discussed with patient / health care proxy about goals of care / treatment preferences for patient.  [====Goals of Care====]         HISTORY:     History obtained from: Patient, chart    CHIEF COMPLAINT: Weakness/inability to walk    HPI/SUBJECTIVE:    The patient is:   [x] Verbal and participatory  [] Non-participatory due to:   Oriented x4, hard of hearing but has hearing aid for the left ear    Clinical Pain Assessment (nonverbal scale for severity on nonverbal patients):   Clinical Pain Assessment  Severity: 1            FUNCTIONAL ASSESSMENT:     Palliative Performance Scale (PPS):  PPS: 40       PSYCHOSOCIAL/SPIRITUAL SCREENING:     Advance Care Planning:  Advance Care Planning 8/21/2021   Patient's [address] is: -   Primary Decision Maker Name -   Primary Decision [facility] Phone Number -   Primary Decision Maker Relationship to Patient -   Confirm Advance Directive Yes, on file   Patient Would Like to Complete Advance Directive -   Does the patient have other document types -        Any spiritual / Orthodoxy concerns:  [] Yes /  [x] No    Caregiver Burnout:  [] Yes /  [] No /  [x] No Caregiver Present      Anticipatory grief assessment:   [x] Normal  / [] Maladaptive          REVIEW OF SYSTEMS:     Positive and pertinent negative findings in ROS are noted above in HPI. The following systems were [x] reviewed / [] unable to be reviewed as noted in HPI  Other findings are noted below. Systems: constitutional, ears/nose/mouth/throat, respiratory, gastrointestinal, genitourinary, musculoskeletal, integumentary, neurologic, psychiatric, endocrine. Positive findings noted below.   Modified ESAS Completed by: provider   Fatigue: 4       Pain: 1   Anxiety: 0 Nausea: 0     Dyspnea: 3 (at baseline)     Constipation: No PHYSICAL EXAM:     From RN flowsheet:  Wt Readings from Last 3 Encounters:   08/20/21 87.1 kg (192 lb)   07/14/21 95.3 kg (210 lb)   04/22/21 106.6 kg (235 lb 1.6 oz)     Blood pressure (!) 114/53, pulse 85, temperature 97.9 °F (36.6 °C), resp. rate 20, height 5' 9.02\" (1.753 m), weight 87.1 kg (192 lb), SpO2 99 %.     Pain Scale 1: Numeric (0 - 10)  Pain Intensity 1: 0     Pain Location 1: Head     Pain Description 1: Aching  Pain Intervention(s) 1: Medication (see MAR)      Constitutional: Awake ,alert, NAD, lying in bed, appears chronically ill  Eyes: pupils equal, anicteric  ENMT: no nasal discharge, moist mucous membranes  Cardiovascular: regular rhythm, distal pulses intact  Respiratory: breathing not labored, symmetric, on NC  Gastrointestinal: soft non-tender, +bowel sounds  Musculoskeletal: no deformity, no tenderness to palpation  Skin: warm, dry, dressing left heel/foot clean, dry, and intact  Neurologic: following commands, moving all extremities, oriented x 4  Psychiatric: full affect, no hallucinations         HISTORY:     Principal Problem:    Cellulitis of left ankle (8/23/2021)    Active Problems:    Chronic obstructive pulmonary disease ([facility]) (4/20/2018)      Chronic renal disease, stage 3, moderately decreased glomerular filtration rate between 30-59 mL/min/1.73 square meter ([facility]) (7/20/2018)      Age-related physical debility (7/8/2019)      Intercondylar fracture of femur (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 (8/23/2021)      Past Medical History:   Diagnosis Date    Brain aneurysm     Cancer [facility])     prostate    COPD (chronic obstructive pulmonary disease) ([facility]     Hypertension     Nocturia     On home oxygen therapy     Pneumonia     Prostate neoplasm     Radiation effect       Past Surgical History:   Procedure Laterality Date    HX HERNIA REPAIR      HX HIP ARTHROSCOPY  04/09/2021      Family History   Problem Relation Age of Onset    Heart Disease Mother       History reviewed, no pertinent family history.   Social History     Tobacco Use    Smoking status: Former Smoker     Types: Cigarettes    Smokeless tobacco: Never Used   Substance Use Topics    Alcohol use: No     Allergies   Allergen Reactions    Aspirin Other (comments)     \"messes my stomach up\"    Bactrim [Sulfamethoxazole-Trimethoprim] Unknown (comments)      Current Facility-Administered Medications   Medication Dose Route Frequency    ciprofloxacin HCl (CIPRO) tablet 500 mg  500 mg Oral Q12H    doxycycline (MONODOX) capsule 100 mg  100 mg Oral Q12H    diphenhydrAMINE (BENADRYL) injection 25 mg  25 mg IntraVENous Q6H PRN    lactated Ringers infusion  125 mL/hr IntraVENous CONTINUOUS    budesonide (PULMICORT) 500 mcg/2 ml nebulizer suspension  500 mcg Nebulization BID RT    arformoteroL (BROVANA) neb solution 15 mcg  15 mcg Nebulization BID RT    sodium chloride (NS) flush 5-40 mL  5-40 mL IntraVENous Q8H    sodium chloride (NS) flush 5-40 mL  5-40 mL IntraVENous PRN    acetaminophen (TYLENOL) tablet 650 mg  650 mg Oral Q6H PRN    Or    acetaminophen (TYLENOL) suppository 650 mg  650 mg Rectal Q6H PRN    polyethylene glycol (MIRALAX) packet 17 g  17 g Oral DAILY PRN    ondansetron (ZOFRAN ODT) tablet 4 mg  4 mg Oral Q8H PRN    Or    ondansetron (ZOFRAN) injection 4 mg  4 mg IntraVENous Q6H PRN    famotidine (PEPCID) tablet 20 mg  20 mg Oral BID    heparin (porcine) injection 5,000 Units  5,000 Units SubCUTAneous Q8H    alum-mag hydroxide-simeth (MYLANTA) oral suspension 15 mL  15 mL Oral Q6H PRN    amLODIPine (NORVASC) tablet 5 mg  5 mg Oral DAILY    polyvinyl alcohol (LIQUIFILM TEARS) 1.4 % ophthalmic solution 2 Drop  2 Drop Both Eyes PRN    dilTIAZem IR (CARDIZEM) tablet 30 mg  30 mg Oral DAILY    ferrous sulfate tablet 325 mg  325 mg Oral BID WITH MEALS    furosemide (LASIX) tablet 20 mg  20 mg Oral DAILY    tamsulosin (FLOMAX) capsule 0.4 mg  0.4 mg Oral QPM    albuterol-ipratropium (DUO-NEB) 2.5 MG-0.5 MG/3 ML  3 mL Nebulization Q4H          LAB AND IMAGING FINDINGS:     Lab Results   Component Value Date/Time    WBC 10.9 08/23/2021 05:45 AM    HGB 8.3 (L) 08/23/2021 05:45 AM    PLATELET 707 00/17/8733 05:45 AM     Lab Results   Component Value Date/Time    Sodium 141 08/24/2021 03:35 AM    Potassium 3.7 08/24/2021 03:35 AM    Chloride 108 08/24/2021 03:35 AM    CO2 25 08/24/2021 03:35 AM    BUN 26 (H) 08/24/2021 03:35 AM    Creatinine 0.99 08/24/2021 03:35 AM    Calcium 8.7 08/24/2021 03:35 AM    Magnesium 1.9 08/19/2021 07:15 AM    Phosphorus 2.7 04/14/2021 03:00 AM      Lab Results   Component Value Date/Time    Alk. phosphatase 99 08/19/2021 07:15 AM    Protein, total 5.7 (L) 08/19/2021 07:15 AM    Albumin 2.4 (L) 08/19/2021 07:15 AM    Globulin 3.3 08/19/2021 07:15 AM     Lab Results   Component Value Date/Time    INR 1.1 04/18/2021 03:17 PM    Prothrombin time 13.7 04/18/2021 03:17 PM    aPTT 32.4 04/18/2021 03:17 PM      No results found for: IRON, FE, TIBC, IBCT, PSAT, FERR   No results found for: PH, PCO2, PO2  No components found for: [place]   Lab Results   Component Value Date/Time    CK 90 07/12/2021 05:48 PM    CK - MB 2.7 07/12/2021 05:48 PM                Total time: 50 minutes  Counseling / coordination time, spent as noted above: 45 minutes  > 50% counseling / coordination?: yes, patient    Prolonged service was provided for  []30 min   []75 min in face to face time in the presence of the patient, spent as noted above. Time Start:   Time End:   Note: this can only be billed with 02649 (initial) or 92229 (follow up). If multiple start / stop times, list each separately.

## 2021-08-25 LAB
ANION GAP SERPL CALC-SCNC: 7 MMOL/L (ref 3–18)
BACTERIA SPEC CULT: ABNORMAL
BUN SERPL-MCNC: 27 MG/DL (ref 7–18)
BUN/CREAT SERPL: 23 (ref 12–20)
CALCIUM SERPL-MCNC: 8.8 MG/DL (ref 8.5–10.1)
CHLORIDE SERPL-SCNC: 108 MMOL/L (ref 100–111)
CO2 SERPL-SCNC: 26 MMOL/L (ref 21–32)
CREAT SERPL-MCNC: 1.17 MG/DL (ref 0.6–1.3)
GLUCOSE SERPL-MCNC: 99 MG/DL (ref 74–99)
GRAM STN SPEC: ABNORMAL
POTASSIUM SERPL-SCNC: 3.5 MMOL/L (ref 3.5–5.5)
SERVICE CMNT-IMP: ABNORMAL
SODIUM SERPL-SCNC: 141 MMOL/L (ref 136–145)

## 2021-08-25 PROCEDURE — 94760 N-INVAS EAR/PLS OXIMETRY 1: CPT

## 2021-08-25 PROCEDURE — 97110 THERAPEUTIC EXERCISES: CPT

## 2021-08-25 PROCEDURE — 97530 THERAPEUTIC ACTIVITIES: CPT

## 2021-08-25 PROCEDURE — 74011250637 HC RX REV CODE- 250/637: Performed by: FAMILY MEDICINE

## 2021-08-25 PROCEDURE — 74011250637 HC RX REV CODE- 250/637: Performed by: INTERNAL MEDICINE

## 2021-08-25 PROCEDURE — 65270000029 HC RM PRIVATE

## 2021-08-25 PROCEDURE — 80048 BASIC METABOLIC PNL TOTAL CA: CPT

## 2021-08-25 PROCEDURE — 36415 COLL VENOUS BLD VENIPUNCTURE: CPT

## 2021-08-25 PROCEDURE — 2709999900 HC NON-CHARGEABLE SUPPLY

## 2021-08-25 PROCEDURE — 94640 AIRWAY INHALATION TREATMENT: CPT

## 2021-08-25 PROCEDURE — 97167 OT EVAL HIGH COMPLEX 60 MIN: CPT

## 2021-08-25 PROCEDURE — 77010033678 HC OXYGEN DAILY

## 2021-08-25 PROCEDURE — 74011000250 HC RX REV CODE- 250: Performed by: FAMILY MEDICINE

## 2021-08-25 PROCEDURE — 74011250636 HC RX REV CODE- 250/636: Performed by: FAMILY MEDICINE

## 2021-08-25 PROCEDURE — 97605 NEG PRS WND THER DME<=50SQCM: CPT

## 2021-08-25 PROCEDURE — 97162 PT EVAL MOD COMPLEX 30 MIN: CPT

## 2021-08-25 PROCEDURE — 77030019934 HC DRSG VAC ASST KCON -B

## 2021-08-25 RX ORDER — ASCORBIC ACID 250 MG
500 TABLET ORAL DAILY
Status: DISCONTINUED | OUTPATIENT
Start: 2021-08-26 | End: 2021-08-26 | Stop reason: HOSPADM

## 2021-08-25 RX ADMIN — BUDESONIDE 500 MCG: 0.5 INHALANT RESPIRATORY (INHALATION) at 20:08

## 2021-08-25 RX ADMIN — TAMSULOSIN HYDROCHLORIDE 0.4 MG: 0.4 CAPSULE ORAL at 17:54

## 2021-08-25 RX ADMIN — ARFORMOTEROL TARTRATE 15 MCG: 15 SOLUTION RESPIRATORY (INHALATION) at 20:08

## 2021-08-25 RX ADMIN — DILTIAZEM HYDROCHLORIDE 30 MG: 30 TABLET, FILM COATED ORAL at 10:02

## 2021-08-25 RX ADMIN — IPRATROPIUM BROMIDE AND ALBUTEROL SULFATE 3 ML: .5; 3 SOLUTION RESPIRATORY (INHALATION) at 23:32

## 2021-08-25 RX ADMIN — CIPROFLOXACIN 500 MG: 500 TABLET, FILM COATED ORAL at 20:32

## 2021-08-25 RX ADMIN — CIPROFLOXACIN 500 MG: 500 TABLET, FILM COATED ORAL at 10:02

## 2021-08-25 RX ADMIN — Medication 10 ML: at 06:00

## 2021-08-25 RX ADMIN — DOXYCYCLINE 100 MG: 100 CAPSULE ORAL at 10:02

## 2021-08-25 RX ADMIN — AMLODIPINE BESYLATE 5 MG: 5 TABLET ORAL at 10:02

## 2021-08-25 RX ADMIN — IPRATROPIUM BROMIDE AND ALBUTEROL SULFATE 3 ML: .5; 3 SOLUTION RESPIRATORY (INHALATION) at 12:10

## 2021-08-25 RX ADMIN — IPRATROPIUM BROMIDE AND ALBUTEROL SULFATE 3 ML: .5; 3 SOLUTION RESPIRATORY (INHALATION) at 03:41

## 2021-08-25 RX ADMIN — FAMOTIDINE 20 MG: 20 TABLET, FILM COATED ORAL at 20:32

## 2021-08-25 RX ADMIN — FAMOTIDINE 20 MG: 20 TABLET, FILM COATED ORAL at 10:02

## 2021-08-25 RX ADMIN — IPRATROPIUM BROMIDE AND ALBUTEROL SULFATE 3 ML: .5; 3 SOLUTION RESPIRATORY (INHALATION) at 16:28

## 2021-08-25 RX ADMIN — FERROUS SULFATE TAB 325 MG (65 MG ELEMENTAL FE) 325 MG: 325 (65 FE) TAB at 10:02

## 2021-08-25 RX ADMIN — FERROUS SULFATE TAB 325 MG (65 MG ELEMENTAL FE) 325 MG: 325 (65 FE) TAB at 17:54

## 2021-08-25 RX ADMIN — ARFORMOTEROL TARTRATE 15 MCG: 15 SOLUTION RESPIRATORY (INHALATION) at 07:13

## 2021-08-25 RX ADMIN — Medication 10 ML: at 22:00

## 2021-08-25 RX ADMIN — HEPARIN SODIUM 5000 UNITS: 5000 INJECTION INTRAVENOUS; SUBCUTANEOUS at 12:07

## 2021-08-25 RX ADMIN — HEPARIN SODIUM 5000 UNITS: 5000 INJECTION INTRAVENOUS; SUBCUTANEOUS at 20:32

## 2021-08-25 RX ADMIN — IPRATROPIUM BROMIDE AND ALBUTEROL SULFATE 3 ML: .5; 3 SOLUTION RESPIRATORY (INHALATION) at 07:13

## 2021-08-25 RX ADMIN — HEPARIN SODIUM 5000 UNITS: 5000 INJECTION INTRAVENOUS; SUBCUTANEOUS at 03:41

## 2021-08-25 RX ADMIN — BUDESONIDE 500 MCG: 0.5 INHALANT RESPIRATORY (INHALATION) at 07:13

## 2021-08-25 RX ADMIN — Medication 10 ML: at 14:00

## 2021-08-25 RX ADMIN — FUROSEMIDE 20 MG: 20 TABLET ORAL at 10:02

## 2021-08-25 RX ADMIN — IPRATROPIUM BROMIDE AND ALBUTEROL SULFATE 3 ML: .5; 3 SOLUTION RESPIRATORY (INHALATION) at 20:08

## 2021-08-25 RX ADMIN — DOXYCYCLINE 100 MG: 100 CAPSULE ORAL at 20:32

## 2021-08-25 NOTE — PROGRESS NOTES
Written shift change report given to TK Berg RN (oncoming nurse) by Leighann Serrano RN (offgoing nurse). Report included the following information SBAR, Kardex, Intake/Output, and MAR. Wound care in to see patient, wound vac applied to left ankle. Patient to be discharged to [address] [place] [address] tomorrow. Transportation to be set up for 1500    Bedside and Verbal shift change report given to CAITLIN Scherer (oncoming nurse) by Herminia Pantoja (offgoing nurse). Report included the following information SBAR, Kardex, Intake/Output, and MAR.

## 2021-08-25 NOTE — PROGRESS NOTES
Hospitalist Progress Note-critical care note     Patient: Crystal Solano MRN: [id number]  CSN: [id number]    YOB: 1945  Age: 64 y.o. 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 8XXA, L08.9  ICD-9-CM: 958.3  8/20/2021 Yes        Supplemental oxygen dependent ICD-10-CM: Z99.81  ICD-9-CM: V46.2  8/20/2021 Yes        MRSA (methicillin resistant staph aureus) culture positive ICD-10-CM: Z22.322  ICD-9-CM: V02.54  8/20/2021 Yes        Pyogenic inflammation of bone ([facility] ICD-10-CM: M86.9  ICD-9-CM: 730.20  8/20/2021 Yes        Intercondylar fracture of femur ([facility] ICD-10-CM: U38.073L  ICD-9-CM: 821.23  7/12/2021 Yes        Age-related physical debility ICD-10-CM: R54  ICD-9-CM: 751  7/8/2019 Unknown        Chronic renal disease, stage 3, moderately decreased glomerular filtration rate between 30-59 mL/min/1.73 square meter (HCC) ICD-10-CM: N18.30  ICD-9-CM: 585.3  7/20/2018 Yes        Chronic obstructive pulmonary disease ([facility] ICD-10-CM: J44.9  ICD-9-CM: 496  4/20/2018 Unknown                Dre Park is a 64 y. o. male with history of multiple medical problems including COPD with oxygen requirement, chronic respiratory failure with hypoxia requiring oxygen, paroxysmal atrial fibrillation, hypertension, chronic kidney disease stage III, history of prostate cancer and osteoporosis admitted for infected left ankle wound that is MRSA+ and is likely has bone involvement c/w osteomyelitis, left femur fracture-poa      Infected left ankle wound  Debridement per Dr. Aries Cheney on board . Id on board and recommend po abx for 10 days after procedure   Wound care consult  Nuclear scan not support OM   Need rehab /snf   pvl negative for dvt        Left femur fracture on 07/12:   F/u with  Dr. Isaac Skinner as out -pt        COPD with chronic respiratory failure with hypoxia requiring oxygen:   Stable   Resume home respiratory medications include DuoNebs every 4 hours  Oxygen supplementation with, requires 2 L at home, to keep at 92%       Paroxysmal atrial fibrillation:   On subcutaneous Lovenox at home for DVT prophylaxis. Resume Cardizem       Hypertension: Controlled per current regimen      Chronic kidney disease stage III :   Continue monitoring       Subjective:  I feel fine. Disposition :1-2 days   Review of systems:    General: No fevers or chills. Cardiovascular: No chest pain or pressure. No palpitations. Pulmonary: No shortness of breath. Gastrointestinal: No nausea, vomiting. Vital signs/Intake and Output:  Visit Vitals  BP (!) 142/58   Pulse 81   Temp 97.8 °F (36.6 °C)   Resp 20   Ht 5' 9.02\" (1.753 m)   Wt 87.1 kg (192 lb)   SpO2 100%   BMI 28.34 kg/m²     Current Shift:  08/25 0701 - 08/25 1900  In: -   Out: 600 [Urine:600]  Last three shifts:  08/23 1901 - 08/25 0700  In: -   Out: 5331 [Urine:2275]    Physical Exam:  General: WD, WN. Alert, cooperative, no acute distress    HEENT: NC, Atraumatic. PERRLA, anicteric sclerae. Lungs: CTA Bilaterally. No Wheezing/Rhonchi/Rales. Heart:  Regular  rhythm,  No murmur, No Rubs, No Gallops  Abdomen: Soft, Non distended, Non tender. +Bowel sounds,   Extremities: No c/c,left ankle wrapped,  Psych:   Not anxious or agitated. Neurologic:  No acute neurological deficit.              Labs: Results:       Chemistry Recent Labs     08/25/21  0229 08/24/21  0335 08/23/21  0545   GLU 99 105* 96    141 141   K 3. 5 3.7 3.4*    108 108   CO2 26 25 26   BUN 27* 26* 26*   CREA 1.17 0.99 1.02   CA 8.8 8.7 8.6   AGAP 7 8 7   BUCR 23* 26* 25*      CBC w/Diff Recent Labs     08/23/21  0545   WBC 10.9   RBC 3.12*   HGB 8.3*   HCT 26.4*         Cardiac Enzymes No results for input(s): CPK, CKND1, WILLARD in the last 72 hours. No lab exists for component: CKRMB, TROIP   Coagulation No results for input(s): PTP, INR, APTT, INREXT, INREXT in the last 72 hours. Lipid Panel No results found for: CHOL, CHOLPOCT, CHOLX, CHLST, CHOLV, 608567, HDL, HDLP, LDL, LDLC, DLDLP, 228451, VLDLC, VLDL, TGLX, TRIGL, TRIGP, TGLPOCT, CHHD, CHHDX   BNP No results for input(s): BNPP in the last 72 hours. Liver Enzymes No results for input(s): TP, ALB, TBIL, AP in the last 72 hours. No lab exists for component: SGOT, GPT, DBIL   Thyroid Studies Lab Results   Component Value Date/Time    TSH 3.06 04/05/2021 01:30 PM        Procedures/imaging: see electronic medical records for all procedures/Xrays and details which were not copied into this note but were reviewed prior to creation of Plan    XR FEMUR LT 2 V    Result Date: 8/21/2021  EXAM: XR FEMUR LT 2 V CLINICAL INDICATION/HISTORY: Left leg pain  COMPARISON: None. TECHNIQUE: AP and lateral views of the left femur were obtained. _______________ FINDINGS: Intact left hip arthroplasty. Subacute appearing, nondisplaced spiral fracture extends through the diaphysis and distal metaphysis of the left femur with mild adjacent callus formation. Prominent lateral left hip and proximal left thigh soft tissue swelling and curvilinear density, concerning for focal fluid collection. No acute fracture. _______________     Subacute-appearing, nondisplaced distal left femoral fracture with ongoing osseous remodeling. Prominent left hip and proximal left thigh soft swelling with more focal curvilinear fluid collection, possible phlegmonous collection or focal soft tissue abscess.      XR TIB/FIB LT    Result Date: 8/20/2021  EXAM: XR ANKLE LT MIN 3 V, XR FOOT LT AP/LAT, XR TIB/FIB LT CLINICAL INDICATION/HISTORY: left lower leg wound, left foot/posterior ankle wound   > Additional: None. COMPARISON: None. TECHNIQUE: 2 views of the left tibia-fibula. 3 views of the left ankle. 2 views of the left foot. _______________ FINDINGS: Diffuse osseous mineralization. Possible lucency in the posterior distal tibia versus osteopenia with overlying ulcer, cannot exclude osteomyelitis. Additional nonspecific patchy sclerosis in the calcaneus, reflect chronic infection. No evidence of acute fracture or dislocation. _______________     Possible lucency in the posterior distal tibia versus osteopenia with overlying ulcer, cannot exclude osteomyelitis. Correlate with MRI. XR ANKLE LT MIN 3 V    Result Date: 8/20/2021  EXAM: XR ANKLE LT MIN 3 V, XR FOOT LT AP/LAT, XR TIB/FIB LT CLINICAL INDICATION/HISTORY: left lower leg wound, left foot/posterior ankle wound   > Additional: None. COMPARISON: None. TECHNIQUE: 2 views of the left tibia-fibula. 3 views of the left ankle. 2 views of the left foot. _______________ FINDINGS: Diffuse osseous mineralization. Possible lucency in the posterior distal tibia versus osteopenia with overlying ulcer, cannot exclude osteomyelitis. Additional nonspecific patchy sclerosis in the calcaneus, reflect chronic infection. No evidence of acute fracture or dislocation. _______________     Possible lucency in the posterior distal tibia versus osteopenia with overlying ulcer, cannot exclude osteomyelitis. Correlate with MRI. XR FOOT LT AP/LAT    Result Date: 8/20/2021  EXAM: XR ANKLE LT MIN 3 V, XR FOOT LT AP/LAT, XR TIB/FIB LT CLINICAL INDICATION/HISTORY: left lower leg wound, left foot/posterior ankle wound   > Additional: None. COMPARISON: None. TECHNIQUE: 2 views of the left tibia-fibula. 3 views of the left ankle.  2 views of the left foot. _______________ FINDINGS: Diffuse osseous mineralization. Possible lucency in the posterior distal tibia versus osteopenia with overlying ulcer, cannot exclude osteomyelitis. Additional nonspecific patchy sclerosis in the calcaneus, reflect chronic infection. No evidence of acute fracture or dislocation. _______________     Possible lucency in the posterior distal tibia versus osteopenia with overlying ulcer, cannot exclude osteomyelitis. Correlate with MRI.       Lubna Bonilla MD

## 2021-08-25 NOTE — WOUND CARE
08/25/21 1332   Wound Ankle Left;Posterior   Date First Assessed/Time First Assessed: 08/20/21 1347   Primary Wound Type: Pressure Injury  Location: Ankle  Wound Location Orientation: Left;Posterior   Wound Image    Wound Etiology Pressure Stage 3   Dressing Status Clean;Dry; Intact   Cleansed Wound cleanser;Irrigated with saline   Dressing/Treatment Negative Pressure Wound Therapy   Wound Length (cm)   (graft in place, unable to measure)   Wound Assessment Graft   Drainage Amount Small   Drainage Description Serosanguinous   Wound Odor None   Camryn-Wound/Incision Assessment Dry/flaky;Fragile   Edges Undefined edges   Wound Thickness Description Full thickness   Wound Buttocks Lower;Right excoriation lower right    Date First Assessed/Time First Assessed: 08/25/21 1336   Present on Hospital Admission: No  Primary Wound Type: Abrasion  Location: Buttocks  Wound Location Orientation: Lower;Right  Wound Description: excoriation lower right    Wound Image    Wound Etiology Other (Comment)  (excoriation)   Dressing/Treatment   (skin moisturizer)   Wound Assessment Erythema   Drainage Amount None   Camryn-Wound/Incision Assessment Blanchable erythema; Excoriated   Wound Thickness Description Partial thickness

## 2021-08-25 NOTE — PROGRESS NOTES
Nutrition Assessment     Type and Reason for Visit: Reassess    Nutrition Recommendations/Plan: Will order Justin BID. Will order daily MVI and daily vitamin C 500mg for wound healing. Nutrition Assessment:  PMHx: COPD, chronic respiratory failure with hypoxia, paroxysmal atrial fibrillation, htn, CKD stage III, prostate cancer and osteoporosis. admitted for infected left ankle wound that is MRSA+. Estimated Daily Nutrient Needs:  Energy (kcal):  7608-3119  Protein (g):  113-130       Fluid (ml/day):  8415-3901    Nutrition Related Findings:  Med: Flomax, lactated ringer, lasix, ferrous sulfate, pepcid. +BM 8/24.       Current Nutrition Therapies:  ADULT DIET Regular    Anthropometric Measures:  · Height:  5' 9.02\" (175.3 cm)  · Current Body Wt:  87.1 kg (192 lb 0.3 oz)  · BMI: 28.3    Nutrition Diagnosis:   · In context of acute illness or injury related to increased demand for energy/nutrients as evidenced by wounds (Pressure injury)    Nutrition Intervention:  Food and/or Nutrient Delivery: Continue current diet, Start oral nutrition supplement, Vitamin supplement  Nutrition Education and Counseling: No recommendations at this time  Coordination of Nutrition Care: Continue to monitor while inpatient    Goals:  Intake >75% of nutritional needs within 5-7 days       Nutrition Monitoring and Evaluation:   Behavioral-Environmental Outcomes: None identified  Food/Nutrient Intake Outcomes: Food and nutrient intake, Supplement intake  Physical Signs/Symptoms Outcomes: Biochemical data, Meal time behavior, Skin, Weight, GI status, Nausea/vomiting    Discharge Planning:    Continue current diet     Electronically signed by Des Butterfield RD on 8/25/2021 at 2:18 PM

## 2021-08-25 NOTE — PROGRESS NOTES
Problem: Patient Education:  Go to Education Activity  Goal: Patient/Family Education  Outcome: Progressing Towards Goal     Problem: Falls - Risk of  Goal: *Absence of Falls  Description: Document Juan Carlos Sites Fall Risk and appropriate interventions in the flowsheet.   Outcome: Progressing Towards Goal  Note: Fall Risk Interventions:            Medication Interventions: Bed/chair exit alarm    Elimination Interventions: Bed/chair exit alarm    History of Falls Interventions: Bed/chair exit alarm

## 2021-08-25 NOTE — PROGRESS NOTES
D/C Plan:  Return to [facility]    Noted pt is having a wound vac placed. CM notified [facility] that this will be need to be ordered for their facility in preporation for pt's return. CM to continue to follow and assist.      1535:  CM has been notified pt will be medically stable to return to [facility] tomorrow. CM attempted to contact pt's wife, Berto Coleman ([phone]), and left a message requesting a return call.   CM to continue to follow and assist.        Care Management Interventions  Mode of Transport at Discharge: BLS  Transition of Care Consult (CM Consult): Discharge Planning, SNF  Health Maintenance Reviewed: Yes  Current Support Network: Lives with Spouse, [facility] [address]  The Plan for Transition of Care is Related to the Following Treatment Goals : Return to [facility]  The Patient and/or Patient Representative was Provided with a Choice of Provider and Agrees with the Discharge Plan?: Yes  Name of the Patient Representative Who was Provided with a Choice of Provider and Agrees with the Discharge Plan: spouse  Freedom of Choice List was Provided with Basic Dialogue that Supports the Patient's Individualized Plan of Care/Goals, Treatment Preferences and Shares the Quality Data Associated with the Providers?: Yes  Discharge Location  Discharge Placement: Skilled nursing facility ([facility] )

## 2021-08-25 NOTE — PROGRESS NOTES
PT note: consult received and chart reviewed. Evaluation attempted. Maintenance staff in pt room at this time. Will f/up at later time as pt schedule allows.   Thank you for this referral.  Agueda Troncoso, PT

## 2021-08-25 NOTE — PROGRESS NOTES
Problem: Mobility Impaired (Adult and Pediatric)  Goal: *Acute Goals and Plan of Care (Insert Text)  Description: Physical Therapy Goals   Initiated 8/25/2021 and to be accomplished within 7 day(s)  1. Patient will move from supine <> sit with S with short L KI in place for change of position. 2.  Patient will tolerated sitting EOB x 15 min for ADL performance. 3.  Patient will transfer bed to chair via scooting (NWB L LE with short KI) for time up in chair  4. Patient will demonstrate R LE and L LE (as tolerated) strengthening HEP accurately for safe performance. Outcome: Progressing Towards Goal  PHYSICAL THERAPY EVALUATION    Patient: Jason Lee (54 y.o. male)  Date: 8/25/2021  Primary Diagnosis: Osteomyelitis of left ankle (HCC) [M86.9]  Procedure(s) (LRB):  INCISION AND DRAINAGE, DEBRIDEMENT AND APPLICATION OF STRAVIX SKIN GRAFT OF LEFT ANKLE AND FOOT  WOUND  (Left) 2 Days Post-Op   Precautions:   Fall, NWB (L LE), Short Knee Immobilizer L LE  PLOF: pt has been non ambulatory since fall in July with femoral fx, ORIF repair and use of subsequent long leg brace with knee locked in extension and SNF admission for rehab (pt admitted from SNF)    ASSESSMENT :  Based on the objective data described below, the patient is seen on medical unit with OT for second set of skilled hands and max pt safety during functional tasks. Pt is very Tribal (has L Hearing Aide) and presents with L ankle wound vac in place, with decreased ROM/motor performance BLE's and subsequent limitations in overall functional mobility. Patient reports no pain. L LE resting in moderate frog leg position. Pt requires total assist of 2 to shift up in bed. Pillow support placed beneath LE (avoiding ankle) for comfort and to improve positioning; LE placed in neutral position as tolerated. Unable to extend knee past ~40 deg; nurse [facility] notified of orders for short KI LLE and aware of same (will obtain same).   No further functional mobility tasks performed as KI not available. Pt left in NAD with all needs in reach, and nurse Suman aware. Answered pt questions regarding PT and mobility. Recommend pt return to SNF to resume rehab upon discharge. Patient will benefit from skilled intervention to address the above impairments. Pt Education: Role of physical therapy in acute care setting, fall prevention and safety/technique during functional mobility tasks    Patient's rehabilitation potential is considered to be Fair  Factors which may influence rehabilitation potential include:   []         None noted  []         Mental ability/status  [x]         Medical condition  [x]         Home/family situation and support systems  []         Safety awareness  []         Pain tolerance/management  []         Other:      PLAN :  Recommendations and Planned Interventions:   [x]           Bed Mobility Training             []    Neuromuscular Re-Education  [x]           Transfer Training                   []    Orthotic/Prosthetic Training  []           Gait Training                          []    Modalities  [x]           Therapeutic Exercises           []    Edema Management/Control  [x]           Therapeutic Activities            []    Family Training/Education  [x]           Patient Education  []           Other (comment):    Frequency/Duration: Patient will be followed by physical therapy daily to address goals. Discharge Recommendations: Skilled Nursing Facility  Further Equipment Recommendations for Discharge: N/A     SUBJECTIVE:   Patient stated I want to get home; I been on my back since July twelfth.     OBJECTIVE DATA SUMMARY:     Past Medical History:   Diagnosis Date    Brain aneurysm     [address]     prostate    COPD [facility]chronic obstructive pulmonary disease) [facility]     Hypertension     Nocturia     On home oxygen therapy     Pneumonia     Prostate neoplasm     Radiation effect      Past Surgical History: Procedure Laterality Date    HX HERNIA REPAIR      HX HIP ARTHROSCOPY  04/09/2021     Barriers to Learning/Limitations: yes;  sensory deficits-vision/hearing/speech and physical  Compensate with: Visual Cues, Verbal Cues, and Tactile Cues  Home Situation:  Home Situation  Home Environment: Private residence  # Steps to Enter: 6  Rails to Enter: Yes  Hand Rails : Bilateral  One/Two Story Residence: One story  Living Alone: No  Support Systems: Spouse/Significant Other/Partner  Patient Expects to be Discharged to[de-identified] Rehabilitation facility  Current DME Used/Available at Home: [place], straight, Walker, rolling  Critical Behavior:  Neurologic State: Alert; Appropriate for age  Orientation Level: Oriented X4  Cognition: Follows commands  Safety/Judgement: Awareness of environment  Psychosocial  Patient Behaviors: Calm; Cooperative  Family  Behaviors: Calm  Needs Expressed: Educational;Emotional  Purposeful Interaction: Yes  Pt Identified Daily Priority: Clinical issues (comment)  Caritas Process: Nurture loving kindness;Enable irais/hope;Establish trust;Attend basic human needs;Create healing environment  Caring Interventions: Reassure; Therapeutic modalities  Reassure: Therapeutic listening;Caring rounds  Therapeutic Modalities: Humor; Intentional therapeutic touch  Skin Condition/Temp: Flaky; Warm  Family  Behaviors: Calm  Skin Integrity: Wound (add Wound LDA)  Skin Integumentary  Skin Color: Appropriate for ethnicity  Skin Condition/Temp: Flaky; Warm  Skin Integrity: Wound (add Wound LDA)  Strength:    Strength: Generally decreased, functional  Tone & Sensation:   Tone: Normal  Sensation: Intact  Range Of Motion:  AROM: Generally decreased, functional  PROM: Generally decreased, functional  Functional Mobility:  Bed Mobility:  Scooting:  Total assistance;Assist x2  Therapeutic Exercises:   Encouraged HS's and AP's R LE  Pain:  Pain level pre-treatment: 0/10   Pain level post-treatment: 0/10   Pain Intervention(s) : Medication (see MAR); Rest, Ice, Repositioning  Response to intervention: Nurse notified, See doc flow  Activity Tolerance:   Fair   Please refer to the flowsheet for vital signs taken during this treatment. After treatment:   []         Patient left in no apparent distress sitting up in chair  [x]         Patient left in no apparent distress in bed  [x]         Call bell left within reach  [x]         Nursing notified  []         Caregiver present  []         Bed alarm activated  []         SCDs applied    COMMUNICATION/EDUCATION:   [x]         Role of Physical Therapy in the acute care setting. [x]         Fall prevention education was provided and the patient/caregiver indicated understanding. [x]         Patient/family have participated as able in goal setting and plan of care. [x]         Patient/family agree to work toward stated goals and plan of care. []         Patient understands intent and goals of therapy, but is neutral about his/her participation. []         Patient is unable to participate in goal setting/plan of care: ongoing with therapy staff.  []         Other:     Thank you for this referral.  Monica Clarke, PT   Time Calculation: 24 mins      Eval Complexity: History: HIGH Complexity :3+ comorbidities / personal factors will impact the outcome/ POC Exam:MEDIUM Complexity : 3 Standardized tests and measures addressing body structure, function, activity limitation and / or participation in recreation  Presentation: MEDIUM Complexity : Evolving with changing characteristics  Clinical Decision Making:High Complexity    Overall Complexity:MEDIUM

## 2021-08-25 NOTE — WOUND CARE
Negative Pressure    NAME:  Tommy Glover  YOB: 1945  MEDICAL RECORD NUMBER:  [id number]  DATE:  8/20/2021     Applied Negative Pressure to left achillies area wound(s)/ulcer(s).  [x] Applied skin barrier prep to rachid-wound.  [x] Cut strips of plastic drape to picture frame wound so that rachid-wound is     covered with the drape.  [x] If bridging dressing to less prominent site, cover any intact skin that will come in contact with the Negative Pressure Therapy sponge, gauze or channel drain with plastic drape. The sponge should never touch intact skin.  [x] Cut sponge, gauze or channel drain to size which will fit into the wound/ulcer bed without being forced.  [x] Be sure the sponge is large enough to hold the entire round plastic flange which is attached to the tubing. Never allow flange to be larger than the sponge or it will produce suction damaging intact skin.  Total number of individual pieces of foam used within the wound bed: 3 pieces of black sponge   [x] If bridging the dressing away from the primary site, be sure the bridge leads to a piece of sponge large enough to hold the entire flange without allowing any of the flange to overlap onto intact skin.  [x] Covered sponge, gauze or channel drain with plastic drape.  [x] Cut a hole in this plastic drape directly over the sponge the same size as the plastic drain tubing.  [x] Removed plastic liner from flange and apply it directly over the hole you cut.  [x] Removed the plastic cover from the flange.  [x] Attached the tubing to the wound/ulcer Negative Pressure Therapy and turn it on to be sure a vacuum is created and that there are no leaks.  [x] If air leaks occur, use plastic drape to patch them.  [x] Secured Negative Pressure Therapy dressing with ace wrap loosely if located on an extremity. Maintain tubing outside of ace wrap. Tubing must not exert pressure on intact skin.     Response to treatment: Patient tolerated treatment with moderate discomfort but relieved after procedure was completed     Applied per  Guidelines      Electronically signed by Tristan Mota RN on 8/25/2021 at 1:40 PM

## 2021-08-26 VITALS
WEIGHT: 192 LBS | TEMPERATURE: 98 F | BODY MASS INDEX: 28.44 KG/M2 | RESPIRATION RATE: 19 BRPM | DIASTOLIC BLOOD PRESSURE: 48 MMHG | OXYGEN SATURATION: 100 % | HEART RATE: 80 BPM | SYSTOLIC BLOOD PRESSURE: 122 MMHG | HEIGHT: 69 IN

## 2021-08-26 LAB
ANION GAP SERPL CALC-SCNC: 7 MMOL/L (ref 3–18)
BACTERIA SPEC CULT: ABNORMAL
BACTERIA SPEC CULT: ABNORMAL
BACTERIA SPEC CULT: NORMAL
BACTERIA SPEC CULT: NORMAL
BUN SERPL-MCNC: 26 MG/DL (ref 7–18)
BUN/CREAT SERPL: 23 (ref 12–20)
CALCIUM SERPL-MCNC: 8.3 MG/DL (ref 8.5–10.1)
CHLORIDE SERPL-SCNC: 107 MMOL/L (ref 100–111)
CO2 SERPL-SCNC: 28 MMOL/L (ref 21–32)
COVID-19 RAPID TEST, COVR: NOT DETECTED
CREAT SERPL-MCNC: 1.11 MG/DL (ref 0.6–1.3)
GLUCOSE SERPL-MCNC: 106 MG/DL (ref 74–99)
POTASSIUM SERPL-SCNC: 3.4 MMOL/L (ref 3.5–5.5)
SERVICE CMNT-IMP: ABNORMAL
SERVICE CMNT-IMP: NORMAL
SERVICE CMNT-IMP: NORMAL
SODIUM SERPL-SCNC: 142 MMOL/L (ref 136–145)
SOURCE, COVRS: NORMAL

## 2021-08-26 PROCEDURE — 74011250636 HC RX REV CODE- 250/636: Performed by: HOSPITALIST

## 2021-08-26 PROCEDURE — 87635 SARS-COV-2 COVID-19 AMP PRB: CPT

## 2021-08-26 PROCEDURE — 74011250637 HC RX REV CODE- 250/637: Performed by: FAMILY MEDICINE

## 2021-08-26 PROCEDURE — 74011250637 HC RX REV CODE- 250/637: Performed by: INTERNAL MEDICINE

## 2021-08-26 PROCEDURE — 94760 N-INVAS EAR/PLS OXIMETRY 1: CPT

## 2021-08-26 PROCEDURE — 74011000250 HC RX REV CODE- 250: Performed by: FAMILY MEDICINE

## 2021-08-26 PROCEDURE — 36415 COLL VENOUS BLD VENIPUNCTURE: CPT

## 2021-08-26 PROCEDURE — 94640 AIRWAY INHALATION TREATMENT: CPT

## 2021-08-26 PROCEDURE — 77010033678 HC OXYGEN DAILY

## 2021-08-26 PROCEDURE — 74011250636 HC RX REV CODE- 250/636: Performed by: FAMILY MEDICINE

## 2021-08-26 PROCEDURE — 80048 BASIC METABOLIC PNL TOTAL CA: CPT

## 2021-08-26 RX ORDER — CIPROFLOXACIN 500 MG/1
500 TABLET ORAL EVERY 12 HOURS
Qty: 16 TABLET | Refills: 0 | Status: SHIPPED | OUTPATIENT
Start: 2021-08-26 | End: 2021-09-03

## 2021-08-26 RX ORDER — DOXYCYCLINE 100 MG/1
100 CAPSULE ORAL EVERY 12 HOURS
Qty: 16 CAPSULE | Refills: 0 | Status: SHIPPED | OUTPATIENT
Start: 2021-08-26 | End: 2021-09-03

## 2021-08-26 RX ADMIN — FAMOTIDINE 20 MG: 20 TABLET, FILM COATED ORAL at 09:31

## 2021-08-26 RX ADMIN — Medication 10 ML: at 14:00

## 2021-08-26 RX ADMIN — HEPARIN SODIUM 5000 UNITS: 5000 INJECTION INTRAVENOUS; SUBCUTANEOUS at 05:05

## 2021-08-26 RX ADMIN — DOXYCYCLINE 100 MG: 100 CAPSULE ORAL at 09:31

## 2021-08-26 RX ADMIN — AMLODIPINE BESYLATE 5 MG: 5 TABLET ORAL at 09:32

## 2021-08-26 RX ADMIN — DILTIAZEM HYDROCHLORIDE 30 MG: 30 TABLET, FILM COATED ORAL at 09:32

## 2021-08-26 RX ADMIN — MULTIPLE VITAMINS W/ MINERALS TAB 1 TABLET: TAB at 09:32

## 2021-08-26 RX ADMIN — FERROUS SULFATE TAB 325 MG (65 MG ELEMENTAL FE) 325 MG: 325 (65 FE) TAB at 09:32

## 2021-08-26 RX ADMIN — BUDESONIDE 500 MCG: 0.5 INHALANT RESPIRATORY (INHALATION) at 07:16

## 2021-08-26 RX ADMIN — FUROSEMIDE 20 MG: 20 TABLET ORAL at 09:32

## 2021-08-26 RX ADMIN — ACETAMINOPHEN 650 MG: 325 TABLET, FILM COATED ORAL at 09:38

## 2021-08-26 RX ADMIN — IPRATROPIUM BROMIDE AND ALBUTEROL SULFATE 3 ML: .5; 3 SOLUTION RESPIRATORY (INHALATION) at 11:24

## 2021-08-26 RX ADMIN — Medication 500 MG: at 09:32

## 2021-08-26 RX ADMIN — DIPHENHYDRAMINE HYDROCHLORIDE 25 MG: 50 INJECTION, SOLUTION INTRAMUSCULAR; INTRAVENOUS at 09:33

## 2021-08-26 RX ADMIN — Medication 10 ML: at 05:05

## 2021-08-26 RX ADMIN — CIPROFLOXACIN 500 MG: 500 TABLET, FILM COATED ORAL at 09:32

## 2021-08-26 RX ADMIN — IPRATROPIUM BROMIDE AND ALBUTEROL SULFATE 3 ML: .5; 3 SOLUTION RESPIRATORY (INHALATION) at 07:16

## 2021-08-26 RX ADMIN — HEPARIN SODIUM 5000 UNITS: 5000 INJECTION INTRAVENOUS; SUBCUTANEOUS at 11:38

## 2021-08-26 RX ADMIN — IPRATROPIUM BROMIDE AND ALBUTEROL SULFATE 3 ML: .5; 3 SOLUTION RESPIRATORY (INHALATION) at 04:56

## 2021-08-26 RX ADMIN — ARFORMOTEROL TARTRATE 15 MCG: 15 SOLUTION RESPIRATORY (INHALATION) at 07:16

## 2021-08-26 NOTE — PROGRESS NOTES
D/C Plan: [facility]     CM has attempted to contact pt's wife, Donavan Meneses ([phone]), multiple times today and left a messages notifying her of plan transfer back to [facility] today at 3:00pm  and  requested a return call. CM also attempted to contact pt's daughter, Ana Horvath ([phone]), multiple times today and left messages requesting a return call. Anticipate pt will transition back to [facility] today at 3:00. Please note, CM spoke with pt's wife within 24 hours of admission and indicated plan is for pt to return to [facility] as he is a LTC resident at this facility. 1600:  CM received a call from pt's wife and she has indicated she received previous messages from CM and was in agreement with transfer and apologized for not returning call in a timely manner. Transition of Care Plan: [facility]    Communication to Patient/Family:  Met with patient and family, and they are agreeable to the transition plan. The Plan for Transition of Care is related to the following treatment goals: LTC/SNF    The Patient and/or patient representative was provided with a choice of provider and agrees   with the discharge plan. Yes [x] No []    Freedom of choice list was provided with basic dialogue that supports the patient's individualized plan of care/goals and shares the quality data associated with the providers. Yes [x] No []    SNF/Rehab Transition:  Patient has been accepted to [facility] at [address] [facility] for SNF and meets criteria for admission. Patient will transported by medical transport and expected to leave at 3:00pm.    Communication to SNF/Rehab:  Bedside RN,  has been notified to update the transition plan to the facility and call report [phone]. Discharge information has been updated on the AVS and communicated through [facility] or [facility].      Discharge instructions to be fax'd to facility at [phone]     Please include all hard scripts for controlled substances, med rec and dc summary, and AVS in packet. Please medicate for pain prior to dc if possible and needed to help offset delay when patient first arrives to facility. Reviewed and confirmed with facility, [facility] can manage the patient care needs for the following:     Marleni Ma with (X) only those applicable:  Medication:  []Medications are available at the facility  []IV Antibiotics    []Controlled Substance - hard copies available sent. []Weekly Labs    Equipment:  []CPAP/BiPAP  []Wound Vacuum  []Humphreys or Urinary Device  []PICC/Central Line  []Nebulizer  []Ventilator    Treatment:  []Isolation (for MRSA, VRE, etc.)  []Surgical Drain Management  []Tracheostomy Care  []Dressing Changes  []Dialysis with transportation  []PEG Care  []Oxygen  []Daily Weights for Heart Failure    Dietary:  []Any diet limitations  []Tube Feedings   []Total Parenteral Management (TPN)    Financial Resources:  []Medicaid Application Completed    []UAI Completed and copy given to pt/family. []A screening has previously been completed. []Level II Completed    [] Private pay individual who will not become   financially eligible for Medicaid within 6 months from admission to a [address] [place] [address]. [] Individual refused to have screening conducted. []Medicaid Application Completed    []The screening denied because it was determined individual did not need/did not qualify for nursing facility level of care. [] Out of state residents seeking direct admission to a [address] facility.   [] Individuals who are inpatients of an out of state hospital, or in state or out of state veterans/ hospital and seek direct admission to a [address] facility  [] Individuals who are pateints or residents of a state owned/operated facility that is licensed by Department of [employer] and seek direct admission to [address] [place] [address]  [] A screening not required for enrollment in [facility] services as set out in [facility]-  [] [facility]) staff shall perform screenings of the [facility] clients. Advanced Care Plan:  []Surrogate Decision Maker of Care  []POA  []Communicated Code Status and copy sent. Other:         Care Management Interventions  Mode of Transport at Discharge: BLS  Transition of Care Consult (CM Consult): Discharge Planning, SNF  Health Maintenance Reviewed: Yes  Current Support Network: Lives with Spouse, [address]  The Plan for Transition of Care is Related to the Following Treatment Goals : Return to [address] [place] [address]  The Patient and/or Patient Representative was Provided with a Choice of Provider and Agrees with the Discharge Plan?: Yes  Name of the Patient Representative Who was Provided with a Choice of Provider and Agrees with the Discharge Plan: spouse  Freedom of Choice List was Provided with Basic Dialogue that Supports the Patient's Individualized Plan of Care/Goals, Treatment Preferences and Shares the Quality Data Associated with the Providers?: Yes  Discharge Location  Discharge Placement: [address]illed nursing facility ([address] )      .

## 2021-08-26 NOTE — PROGRESS NOTES
Problem: Risk for Spread of Infection  Goal: Prevent transmission of infectious organism to others  Description: Prevent the transmission of infectious organisms to other patients, staff members, and visitors. Outcome: Progressing Towards Goal     Problem: Falls - Risk of  Goal: *Absence of Falls  Description: Document Slade Fall Risk and appropriate interventions in the flowsheet.   Outcome: Progressing Towards Goal  Note: Fall Risk Interventions:            Medication Interventions: Bed/chair exit alarm, Patient to call before getting OOB    Elimination Interventions: Bed/chair exit alarm    History of Falls Interventions: Bed/chair exit alarm

## 2021-08-26 NOTE — PROGRESS NOTES
Bedside and Verbal shift change report given to TK Berg RN (oncoming nurse) by Genna Burgess (offgoing nurse). Report included the following information SBAR, Kardex, Intake/Output, and MAR. Patient to discharged to [facility] today. Medical transport set up for 1500.    0940-Patient complains of itching and pain. Administered benadryl 25 MG IV and Tylenol 650 MG PO. Pain 5/10. Obtained short knee immobalizer from materials management. 1140-Rapid Covid swab done and sent to the lab. 1445-Telephone/Verbal shift change report given to Jailene at [facility] (oncoming nurse) by Shilpa Kaufman (offgoing nurse). Report included the following information SBAR, Kardex, Intake/Output and MAR. Patient discharged.

## 2021-08-26 NOTE — PROGRESS NOTES
Podiatry:  Patient was seen for treatment of open ulcer on the posterior left ankle with exposed Achilles tendon secondary to leg brace for his femur fracture that rubbed and caused his ulcer. Also patient noted to have open ulcer on the left medial ankle. He is receiving IV antibiotics for MRSA infection. He is status post surgical debridement left ankle and foot ulcers and Stravix grafting with wound VAC. Patient's bone scan was negative for osteomyelitis of the posterior distal tibia and therefore should not need long-term IV antibiotic treatment. Patient should be ready for discharge from podiatry standpoint back to [address] [place] [address]  in a few days after medicine decides to DC his IV treatment for MRSA. He should have follow-up with Dr. Claudia Biswas in the wound care center for continued wound treatment of his left ankle and foot. He should also continue to follow-up with Dr. Kelly Schultz for his left femur fracture and to adjust/shorten his left leg brace to prevent further Achilles tendon/ankle ulceration.

## 2021-08-26 NOTE — PROGRESS NOTES
Assumed care of pt shortly after 1930. Pt A&Ox4, VSS. Pt medicated as ordered. No issues throughout the night. Safety measures in place. Call light within reach.

## 2021-08-26 NOTE — DISCHARGE SUMMARY
Discharge Summary    Patient: Glenn Snow MRN: [id number]  CSN: [id number]    YOB: 1945  Age: 64 y.o. 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 8XXA, L08.9  ICD-9-CM: 958.3  8/20/2021 Yes        Supplemental oxygen dependent ICD-10-CM: Z99.81  ICD-9-CM: V46.2  8/20/2021 Yes        MRSA (methicillin resistant staph aureus) culture positive ICD-10-CM: Z22.322  ICD-9-CM: V02.54  8/20/2021 Yes        Pyogenic inflammation of bone ([facility] ICD-10-CM: M86.9  ICD-9-CM: 730.20  8/20/2021 Yes        Intercondylar fracture of femur ([facility] ICD-10-CM: F46.534S  ICD-9-CM: 821.23  7/12/2021 Yes        Age-related physical debility ICD-10-CM: R54  ICD-9-CM: 601  7/8/2019 Unknown        Chronic renal disease, stage 3, moderately decreased glomerular filtration rate between 30-59 mL/min/1.73 square meter (HCC) ICD-10-CM: N18.30  ICD-9-CM: 585.3  7/20/2018 Yes        Chronic obstructive pulmonary disease ([facility] ICD-10-CM: J44.9  ICD-9-CM: 560  4/20/2018 Unknown              Discharge Condition: stable     Discharge Medications:     Current Discharge Medication List      START taking these medications    Details   ciprofloxacin HCl (CIPRO) 500 mg tablet Take 1 Tablet by mouth every twelve (12) hours for 8 days.   Qty: 16 Tablet, Refills: 0  Start date: 8/26/2021, End date: 9/3/2021      doxycycline (MONODOX) 100 mg capsule Take 1 Capsule by mouth every twelve (12) hours for 8 days. Qty: 16 Capsule, Refills: 0  Start date: 8/26/2021, End date: 9/3/2021         CONTINUE these medications which have NOT CHANGED    Details   collagenase (SANTYL) 250 unit/gram ointment Apply  to affected area daily. Qty: 30 g, Refills: 1      arformoteroL (BROVANA) 15 mcg/2 mL nebu neb solution 2 mL by Nebulization route two (2) times a day. Qty: 30 Vial, Refills: 0      budesonide (PULMICORT) 0.5 mg/2 mL nbsp 2 mL by Nebulization route two (2) times a day. Qty: 30 Each, Refills: 0      enoxaparin (LOVENOX) 40 mg/0.4 mL 0.4 mL by SubCUTAneous route daily. Qty: 21 Syringe, Refills: 0      famotidine (PEPCID) 20 mg tablet Take 1 Tab by mouth daily. Qty: 30 Tab, Refills: 0      ferrous sulfate 325 mg (65 mg iron) tablet Take 1 Tab by mouth two (2) times daily (with meals). Qty: 30 Tab, Refills: 0      amLODIPine (NORVASC) 5 mg tablet Take 1 Tab by mouth daily. Qty: 30 Tab, Refills: 0      albuterol-ipratropium (DUO-NEB) 2.5 mg-0.5 mg/3 ml nebu 3 mL by Nebulization route every four (4) hours as needed for Wheezing. Qty: 30 Nebule, Refills: 0      albuterol (PROVENTIL HFA, VENTOLIN HFA, PROAIR HFA) 90 mcg/actuation inhaler Take 2 Puffs by inhalation every four (4) hours as needed for Wheezing or Shortness of Breath. Qty: 1 Inhaler, Refills: 1      OXYGEN-AIR DELIVERY SYSTEMS 2 L/min by Nasal route continuous. furosemide (Lasix) 20 mg tablet Take 20 mg by mouth daily. dilTIAZem (CARDIZEM) 30 mg tablet Take 1 Tab by mouth Before breakfast, lunch, and dinner. Qty: 90 Tab, Refills: 0      acetaminophen (TYLENOL) 325 mg tablet Take 650 mg by mouth every eight (8) hours as needed for Pain. dextran 70/hypromellose (ARTIFICIAL TEARS, PF, OP) Apply 2 Drops to eye as needed for Other (both eyes for dryness). tamsulosin (FLOMAX) 0.4 mg capsule Take 0.4 mg by mouth every evening.              Procedures :   INCISION AND DRAINAGE, DEBRIDEMENT AND APPLICATION OF STRAVIX SKIN GRAFT OF LEFT ANKLE AND FOOT  WOUND   Consults: Podiatrist, infection disease specialist      PHYSICAL EXAM   Visit Vitals  BP (!) 130/48 (BP 1 Location: Left upper arm, BP Patient Position: At rest)   Pulse 81   Temp 98.2 °F (36.8 °C)   Resp 19   Ht 5' 9.02\" (1.753 m)   Wt 87.1 kg (192 lb)   SpO2 100%   BMI 28.34 kg/m²     General: Awake, cooperative, no acute distress    HEENT: NC, Atraumatic. PERRLA, EOMI. Anicteric sclerae. Lungs:  CTA Bilaterally. No Wheezing/Rhonchi/Rales. Heart:  Regular  rhythm,  No murmur, No Rubs, No Gallops  Abdomen: Soft, Non distended, Non tender. +Bowel sounds,   Extremities: No c/c, Ace wrapped -left ankle wound vac noted   Psych:   Not anxious or agitated. Neurologic:  No acute neurological deficits. Admission HPI :   Keiko Hernandez is a 64 y.o. male with history of multiple medical problems including COPD with oxygen requirement, chronic respiratory failure with hypoxia requiring oxygen, paroxysmal atrial fibrillation, hypertension, chronic kidney disease stage III, history of prostate cancer and osteoporosis who presents to the emergency department C/O left ankle/foot wound.  Patient is currently at [address] for rehabilitation. Christen Merritt was recently seen at our facility for a fracture of his left femur.  At that time he was admitted to the hospital and did not undergo surgical treatment and opted for conservative management.    Patient is a poor historian and hard of hearing so most of his history is obtained from chart review and discussion with the ER physician who also spoke to staff at [address] [place] [address]. Apparently, when patient opted for medical management only and not surgical management he was given a brace and is thought that the brace may have caused a worsening wound on his left ankle.   Back to [facility] was apparently concerned that the wound was very close to his Achilles tendon. They cultured the wound and it grew out MRSA so they started him on doxycycline which he has been doing for the last 4 to 5 days. However they decided to send him over to the ED today because he became concerned that his wound was continuing to worsen and that purulent material is copiously oozing from the wound. Emergency room physician relayed that the wound was quite concerning with significant amount of odor and drainage and Achilles tendons actually visible because of the deep involvement of the wound. X-rays were taken which demonstrated likely osteomyelitis. Did try to obtain MRI but there was some history the patient had aneurysm with coil and so MRI was advised not to be safe. ER physician spoke to podiatrist, Dr. Kory Porter who recommended a bone scan which has been ordered and advised that patient be treated like he has osteomyelitis and Dr. Kory Porter will see him tomorrow. Hospital Course :   Since patient was admitted to the hospital, IV antibiotics was  started for possible OM. Bone scan was performed no OM indicated. Podiatrist was consulted. INCISION AND DRAINAGE, DEBRIDEMENT AND APPLICATION OF STRAVIX SKIN GRAFT OF LEFT ANKLE AND FOOT  WOUND was performed, he tolerated the procedure very well. Infection disease specialist was on board for wound infection. Recommended Cipro and doxycycline for total 10 days treatment after debridement. He had 8 more days left for IV antibiotics recommended continue complete after discharge. Orthopedics was consulted recommended follow-up as outpatient for fracture. His COPD has been stable, we will continue breathing treatment. Palliative care was consulted . Lovenox for DVT prophylaxis     Discharge planning discussed with patient, pt agrees  with the plan and no questions and concerns at this point.        Activity: Activity as tolerated    Diet: Cardiac Diet    Follow-up: PCP Dr. Singh Galvez orthopedics podiatrist    Disposition:      Minutes spent on discharge: 45 min       Labs: Results:       Chemistry Recent Labs     08/26/21  0325 08/25/21  0229 08/24/21  0335   * 99 105*    141 141   K 3.4* 3.5 3.7    108 108   CO2 28 26 25   BUN 26* 27* 26*   CREA 1.11 1.17 0.99   CA 8.3* 8.8 8.7   AGAP 7 7 8   BUCR 23* 23* 26*      CBC w/Diff No results for input(s): WBC, RBC, HGB, HCT, PLT, GRANS, LYMPH, EOS, HGBEXT, HCTEXT, PLTEXT in the last 72 hours. Cardiac Enzymes No results for input(s): CPK, CKND1, WILLARD in the last 72 hours. No lab exists for component: CKRMB, TROIP   Coagulation No results for input(s): PTP, INR, APTT, INREXT in the last 72 hours. Lipid Panel No results found for: CHOL, CHOLPOCT, CHOLX, CHLST, CHOLV, 792368, HDL, HDLP, LDL, LDLC, DLDLP, 028721, VLDLC, VLDL, TGLX, TRIGL, TRIGP, TGLPOCT, CHHD, CHHDX   BNP No results for input(s): BNPP in the last 72 hours. Liver Enzymes No results for input(s): TP, ALB, TBIL, AP in the last 72 hours. No lab exists for component: SGOT, GPT, DBIL   Thyroid Studies Lab Results   Component Value Date/Time    TSH 3.06 04/05/2021 01:30 PM          @micro    Significant Diagnostic Studies: NM BONE SCAN 3 PHASE    Result Date: 8/23/2021  THREE PHASE BONE SCAN INDICATION:  Soft tissue infection along the posterior aspect of the left ankle with exposed Achilles tendon. Evaluation for potential distal tibial osteomyelitis. TECHNIQUE:  25.3 mCi of Technetium-99 MDP was administered IV and three phase imaging of bilateral feet and ankles was performed. FINDINGS:  Angiographic phase of imaging demonstrates minor hyperemia along the posterior soft tissues of the left leg. Similar soft tissue uptake noted on blood pool phase imaging. No definite osseous uptake appreciated on delayed phase imaging. 1.  Soft tissue findings in keeping with known ulcer. No scintigraphic findings to suggest osteomyelitis.     XR FEMUR LT 2 V    Result Date: 8/21/2021  EXAM: XR FEMUR LT 2 V CLINICAL INDICATION/HISTORY: Left leg pain  COMPARISON: None. TECHNIQUE: AP and lateral views of the left femur were obtained. _______________ FINDINGS: Intact left hip arthroplasty. Subacute appearing, nondisplaced spiral fracture extends through the diaphysis and distal metaphysis of the left femur with mild adjacent callus formation. Prominent lateral left hip and proximal left thigh soft tissue swelling and curvilinear density, concerning for focal fluid collection. No acute fracture. _______________     Subacute-appearing, nondisplaced distal left femoral fracture with ongoing osseous remodeling. Prominent left hip and proximal left thigh soft swelling with more focal curvilinear fluid collection, possible phlegmonous collection or focal soft tissue abscess. XR TIB/FIB LT    Result Date: 8/20/2021  EXAM: XR ANKLE LT MIN 3 V, XR FOOT LT AP/LAT, XR TIB/FIB LT CLINICAL INDICATION/HISTORY: left lower leg wound, left foot/posterior ankle wound   > Additional: None. COMPARISON: None. TECHNIQUE: 2 views of the left tibia-fibula. 3 views of the left ankle. 2 views of the left foot. _______________ FINDINGS: Diffuse osseous mineralization. Possible lucency in the posterior distal tibia versus osteopenia with overlying ulcer, cannot exclude osteomyelitis. Additional nonspecific patchy sclerosis in the calcaneus, reflect chronic infection. No evidence of acute fracture or dislocation. _______________     Possible lucency in the posterior distal tibia versus osteopenia with overlying ulcer, cannot exclude osteomyelitis. Correlate with MRI. XR ANKLE LT MIN 3 V    Result Date: 8/20/2021  EXAM: XR ANKLE LT MIN 3 V, XR FOOT LT AP/LAT, XR TIB/FIB LT CLINICAL INDICATION/HISTORY: left lower leg wound, left foot/posterior ankle wound   > Additional: None. COMPARISON: None. TECHNIQUE: 2 views of the left tibia-fibula. 3 views of the left ankle. 2 views of the left foot. _______________ FINDINGS: Diffuse osseous mineralization. Possible lucency in the posterior distal tibia versus osteopenia with overlying ulcer, cannot exclude osteomyelitis. Additional nonspecific patchy sclerosis in the calcaneus, reflect chronic infection. No evidence of acute fracture or dislocation. _______________     Possible lucency in the posterior distal tibia versus osteopenia with overlying ulcer, cannot exclude osteomyelitis. Correlate with MRI. XR FOOT LT AP/LAT    Result Date: 8/20/2021  EXAM: XR ANKLE LT MIN 3 V, XR FOOT LT AP/LAT, XR TIB/FIB LT CLINICAL INDICATION/HISTORY: left lower leg wound, left foot/posterior ankle wound   > Additional: None. COMPARISON: None. TECHNIQUE: 2 views of the left tibia-fibula. 3 views of the left ankle. 2 views of the left foot. _______________ FINDINGS: Diffuse osseous mineralization. Possible lucency in the posterior distal tibia versus osteopenia with overlying ulcer, cannot exclude osteomyelitis. Additional nonspecific patchy sclerosis in the calcaneus, reflect chronic infection. No evidence of acute fracture or dislocation. _______________     Possible lucency in the posterior distal tibia versus osteopenia with overlying ulcer, cannot exclude osteomyelitis. Correlate with MRI. DUPLEX LOWER EXT VENOUS BILAT    Result Date: 8/24/2021  · No evidence of deep vein thrombosis in the right lower extremity veins assessed. · No evidence of deep vein thrombosis in the left lower extremity veins assessed.               [facility]     CC: Chapo Espinoza MD

## 2021-08-28 LAB
BACTERIA SPEC CULT: ABNORMAL
GRAM STN SPEC: ABNORMAL
GRAM STN SPEC: ABNORMAL
SERVICE CMNT-IMP: ABNORMAL

## 2021-08-30 NOTE — PROCEDURES
Lois Maurice DPM   Physician   Podiatry   Progress Notes       Signed   Date of Service:  08/21/21 1932                    []Hide copied text    []Jessa for details  Podiatry:  Patient seen at bedside today for treatment of painful infected left ankle ulcers with exposed Achilles tendon. Patient states he fell out of his chair on July 12 and fractured his left femur. He has been following with Dr. Vickey Campos but opted for no surgery. He has been wearing a brace on his left leg which subsequently rubbed and caused the ulcer in the back of his left ankle. Nurses at [facility] culture the wound was positive for MRSA and Pseudomonas. He was taking doxycycline, but the wound worsened and was admitted through the ED for more aggressive care. Left ankle x-ray shows possible lucency at the posterior distal tibia suggesting osteomyelitis. Patient is unable to have an MRI secondary to vascular clips in his brain. Therefore three-phase bone scan has been ordered to help determine tibial osteomyelitis. Patient will require surgical debridement with grafting to cover his Achilles tendon wound. This can be done on Monday depending on bone scan results, which might require distal tibia bone biopsy if the bone scan is positive for OM, then 6-8 weeks IV antibiotics. See consult for details.       Electronically signed by Lois Maurice DPM at 08/21/21 1937  Note Details    Author Lois Maurice DPM File Time 08/21/21 1937   Author Type Physician Status Signed   Last  Lois Maurice, [facility] # [de-identified] Admit Date 8/20/2021    ED to Hosp-Admission (Discharged) on 8/20/2021     ED to Hosp-Admission (Discharged) on 8/20/2021        Detailed Report      Note shared with patient

## 2021-09-01 ENCOUNTER — HOSPITAL ENCOUNTER (OUTPATIENT)
Dept: LAB | Age: 76
Discharge: HOME | End: 2021-09-01

## 2021-09-01 PROCEDURE — U0003 INFECTIOUS AGENT DETECTION BY NUCLEIC ACID (DNA OR RNA); SEVERE ACUTE RESPIRATORY SYNDROME CORONAVIRUS 2 (SARS-COV-2) (CORONAVIRUS DISEASE [COVID-19]), AMPLIFIED PROBE TECHNIQUE, MAKING USE OF HIGH THROUGHPUT TECHNOLOGIES AS DESCRIBED BY CMS-2020-01-R: HCPCS

## 2021-09-04 LAB — SARS-COV-2, NAA: DETECTED

## 2021-09-08 NOTE — ROUTINE PROCESS
Chief Complaint   Patient presents with   • Derm Problem     TBSE   • Office Visit     Referred from:  Shoaib Cummings MD / PCP:  Shoaib Cummings MD  History of present illness:  Ricky Perales presents with:    Complaint:  skin lesions - pt request skin cancer screening  Duration: months to years  Location:  entire body  Previous treatments:  none to date  Patient is here for skin cancer screening examination of multiple skin lesions on body today.    Complaint:  skin lesion(s)  Duration:  Years  Location:  Bilateral axilla   Symptoms:  Itches: Yes []   No [x]     Skin Pain: Yes []   No [x]  Previous treatments:  None  Other: Patient would like to discuss skin tag removal    Complaint:  skin lesion(s)  Duration:  1 year  Location:  Chest  Symptoms:  Itches: Yes []   No [x]     Skin Pain: Yes []   No [x]  Previous treatments:  None  Other: Patient believes this is a lipoma    Complaint:  skin lesion(s)  Duration:  4 month  Location:  Scalp  Symptoms:  Itches: Yes [x]   No []     Skin Pain: Yes []   No [x]  Previous treatments:  None  Other:  Scaly and bleeding    Past dermatologic specific history:   None     Family history/social history:   He does not  have a family history of skin cancer.     Review of systems:   Constitutional: No fevers, no chills, no unintentional weight loss   Skin: no other skin complaints    Physical examination:   General: well developed, well nourished, in no acute distress.   ORAL:  Inspection of the oropharynx, lips, teeth, and gums reveals no abnormality.   SKIN:  Complete skin examination including palpation and careful visual examination of the feet, legs, buttocks, back, chest, abdomen, arms, hands, neck, scalp, and face revealed the following significant findings:     3 cm Subcutaneous nodule present on the left abdomen   Stuck on, verrucous papule with gyriform surface change consistent with seborrheic keratosis   Scattered hyperpigmented macules in sun exposed areas consistent with  TRANSFER - OUT REPORT:    Verbal report given to MARKOS Wright RN(name) on Taylor Colunga  being transferred to [facility](unit) for routine progression of care       Report consisted of patients Situation, Background, Assessment and   Recommendations(SBAR). Information from the following report(s) SBAR, Kardex, ED Summary, [facility] and Recent Results was reviewed with the receiving nurse. Lines:   Peripheral IV 04/20/18 Right Antecubital (Active)   Site Assessment Clean, dry, & intact 4/20/2018  4:55 PM   Phlebitis Assessment 0 4/20/2018  4:55 PM   Infiltration Assessment 0 4/20/2018  4:55 PM   Dressing Status Clean, dry, & intact 4/20/2018  4:55 PM   Dressing Type Transparent 4/20/2018  4:55 PM   Hub Color/Line Status Green 4/20/2018  4:55 PM        Opportunity for questions and clarification was provided.       Patient transported with:   Registered Nurse solar lentigines  Multiple tan to hyperpigmented macules, no ugly duckling, no concerning dermoscopic features  Verrucous papule(s) on the: left lower leg  Gritty pink papule(s) with white to yellow scale with a background of photodamage on the: face, scalp  Firm dome shaped hyperpigmented papule, + dimple sign, dermoscopy with stellate white area, consistent with a dermatofibroma on the left forearm   Subcutaneous nodule present on the left earlobe     Assessment and plan:   Diagnoses and all orders for this visit:  Notalgia paresthetica  -     clobetasol (TEMOVATE) 0.05 % cream; Apply thin layer to affected areas on skin bid prn rash/itch.  Don't use on face, groin, or normal skin. Use for max 21d per month.  Sun-damaged skin  Lipoma of torso  Other viral warts  -     DESTRUCTION BENIGN LESIONS 1-14 LESIONS  Lentigo  Multiple nevi  Skin tag  Porokeratosis  Stucco keratosis  Actinic keratoses  -     DESTRUCTION PREMALIGNANT 1ST LESION  -     DESTRUCTION PREMALIGNANT 2-14 LESIONS EACH  Nevus vs MIS  -     DERM SURGICAL PATHOLOGY SEND OUT  -     SPECIMEN HANDLING  -     SHAV SKIN LES 0.6-1.0CM TRUNK,ARM,LEG  -     SHAV SKIN LES 0.6-1.0CM REMAINDR BODY  Dermatofibroma  EIC (epidermal inclusion cyst)       Patient has chronic actinic damage (ie, chronic sun damage) which increases risk for skin cancer.  Sunscreen recommendations provided - active ingredients of zinc or titanium dioxide.      Actinic Keratoses found on exam represent progression/exacerbation of underlying chronic actinic damage.    We will have a team nurse contact the patient to set up a procedure visit (40 minutes, removal/treatment of suspected lipoma on left abdomen)    Procedures performed today:   Shave Removal -- Size:  10 mm   Location:  Right lateral neck    Shave Removal -- Size:  6 mm   Location:  Lower back    Verbal consent obtained.  Risk of scarring, skin color change, bleeding, and infection discussed.   Site prepped with isopropyl  alcohol and anesthetized with 1% lidocaine with epinephrine.  Specimen obtained using shave technique.  No immediate complications encountered.  Hemostasis was achieved using aluminum chloride. Specimen sent to Pathology.  Petroleum jelly and bandage applied.  Wound care discussed with patient.  Will contact patient with results.  ---    Liquid nitrogen procedure  Diagnosis:  Wart  Location(s): left lower leg  Quantity: 2    Diagnosis:  Actinic keratosis   Location(s): face, scalp  Quantity: 3  Verbal consent obtained.  Confirmed correct patient, procedure, side and site.   Patient informed of alternative treatments and the likely effects of freezing including local pain/swelling, blister formation, and the development of a scab.  The patient was also informed of possible side effects including infection, color changes, and scar formation.  The chance for incomplete therapeutic response and the need for further liquid nitrogen treatments was discussed.  Cryotherapy administered to lesion(s) until adequate freeze depth and diameter achieved with rapid freeze and slow thaw.  Procedure tolerated well.  There were no complications.    Return in about 1 year (around 9/8/2022) for TBSE.     On 9/8/2021, Kristen AZAR MA scribed the services personally performed by Nicho Hong MD  The documentation recorded by the scribe accurately and completely reflects the service(s) I personally performed and the decisions made by me.

## 2021-09-09 ENCOUNTER — PATIENT OUTREACH (OUTPATIENT)
Dept: CASE MANAGEMENT | Age: 76
End: 2021-09-09

## 2021-09-09 NOTE — PROGRESS NOTES
Post Acute Facility Update     *The information contained in this note was received during a weekly care coordination call with the post acute facility*    This patient was discharged from [facility] to [facility] [place] ([facility])   on 7/15/21. Hospital Discharge Diagnosis per Dr. Bowie Forward:    Fracture left Femur  Open wound Left Hip  Hip replacement Left Hip  Chronic Resp Failure with Hypoxia  CKD  COPD  HTN    Current Facility: [address] ([facility])  Anticipated Discharge Date: D    Facility Nursing Update: WBAT now able to bear weight on graft sites. * Therapy remains on hold at this time. Patient is now COVID +. Facility Social Work Update: lives with wife  Bundle Program Status: none  Upper Extremity Assistance: Stand by Assist - Presence and Cueing  Lower Extremity Assistance: Moderate Assistance   Bed Mobility: Stand by Assist - Presence and Cueing  Transfers: Minimal Assistance   Ambulation: Dependent  How far (in feet) is the patient ambulating?  None at this time  Device Used:    Barriers to Discharge: lives w/ wife, on home oxygen  Other:  none

## 2021-09-10 ENCOUNTER — HOSPITAL ENCOUNTER (EMERGENCY)
Age: 76
Discharge: HOME | End: 2021-09-10
Attending: EMERGENCY MEDICINE
Payer: COMMERCIAL

## 2021-09-10 VITALS
DIASTOLIC BLOOD PRESSURE: 62 MMHG | OXYGEN SATURATION: 91 % | SYSTOLIC BLOOD PRESSURE: 92 MMHG | TEMPERATURE: 97.9 F | RESPIRATION RATE: 28 BRPM | HEART RATE: 96 BPM

## 2021-09-10 DIAGNOSIS — U07.1 COVID-19: Primary | ICD-10-CM

## 2021-09-10 PROCEDURE — M0243 CASIRIVI AND IMDEVI INFUSION: HCPCS

## 2021-09-10 PROCEDURE — 74011000258 HC RX REV CODE- 258: Performed by: EMERGENCY MEDICINE

## 2021-09-10 PROCEDURE — 99285 EMERGENCY DEPT VISIT HI MDM: CPT

## 2021-09-10 PROCEDURE — 74011000636 HC RX REV CODE- 636: Performed by: EMERGENCY MEDICINE

## 2021-09-10 RX ADMIN — CASIRIVIMAB AND IMDEVIMAB: 600; 600 INJECTION, SOLUTION, CONCENTRATE INTRAVENOUS at 08:00

## 2021-09-10 NOTE — ED NOTES
Report given to [facility] LPN at [facility]. [facility] arranged stretcher transport back. Awaiting transport arrival at this time.

## 2021-09-10 NOTE — ED NOTES
Pt awake and alert, resting comfortably, no s/s adverse reactions noted as of this time r/t infusion. Attempt x1 to call report to [address] [place] [address] and unable to get pts nurse on the phone. Will f/u and re-attempt. [facility] to have transport arranged for p/u of 1000.

## 2021-09-10 NOTE — ED NOTES
[facility] transport on site. I have reviewed discharge instructions with the patient and caregiver. The patient and caregiver verbalized understanding.

## 2021-09-10 NOTE — ED TRIAGE NOTES
Her white cells remain slightly high, but much improved from previously and the same as in May.  If she develops further infectious symptoms, she needs to see Dr Cain again. Otherwise, she will need to be seen by the hematologists Pt to ED via [address] [place] [address] for scheduled Monoclonal Antibody Infusion from [facility]

## 2021-09-10 NOTE — ED NOTES
This nurse remained at the bedside during the first 15min of infusion. No s/s adverse reactions noted as of this time. Will continue to monitor.

## 2021-09-10 NOTE — ED NOTES
Attempt x2 to call report to [facility]. Unable to reach nurse or nursing sup.

## 2021-09-10 NOTE — ED PROVIDER NOTES
EMERGENCY DEPARTMENT HISTORY AND PHYSICAL EXAM    Date: 9/10/2021  Patient Name: Clement Moura    History of Presenting Illness     Chief Complaint   Patient presents with    Other         History Provided By: Patient and EMS    7:05 AM  Clement Moura is a 64 y.o. male with PMHX of COPD, hypertension, chronic renal disease, chronic wounds, diagnosed with COVID-19 infection on September 2 at his nursing facility who presents to the emergency department C/O request for monoclonal antibody infusion. Patient was sent by nursing facility for monoclonal antibody infusion due to his COVID-19 positive status. Patient himself has no complaints at this time. He denies any chest pain, shortness of breath, vomiting. PCP: Wai Molina MD    Current Outpatient Medications   Medication Sig Dispense Refill    collagenase (SANTYL) 250 unit/gram ointment Apply  to affected area daily. 30 g 1    arformoteroL (BROVANA) 15 mcg/2 mL nebu neb solution 2 mL by Nebulization route two (2) times a day. 30 Vial 0    budesonide (PULMICORT) 0.5 mg/2 mL nbsp 2 mL by Nebulization route two (2) times a day. 30 Each 0    enoxaparin (LOVENOX) 40 mg/0.4 mL 0.4 mL by SubCUTAneous route daily. 21 Syringe 0    famotidine (PEPCID) 20 mg tablet Take 1 Tab by mouth daily. 30 Tab 0    ferrous sulfate 325 mg (65 mg iron) tablet Take 1 Tab by mouth two (2) times daily (with meals). 30 Tab 0    amLODIPine (NORVASC) 5 mg tablet Take 1 Tab by mouth daily. 30 Tab 0    albuterol-ipratropium (DUO-NEB) 2.5 mg-0.5 mg/3 ml nebu 3 mL by Nebulization route every four (4) hours as needed for Wheezing. 30 Nebule 0    albuterol (PROVENTIL HFA, VENTOLIN HFA, PROAIR HFA) 90 mcg/actuation inhaler Take 2 Puffs by inhalation every four (4) hours as needed for Wheezing or Shortness of Breath. 1 Inhaler 1    OXYGEN-AIR DELIVERY SYSTEMS 2 L/min by Nasal route continuous.  furosemide (Lasix) 20 mg tablet Take 20 mg by mouth daily.       dilTIAZem (CARDIZEM) 30 mg tablet Take 1 Tab by mouth Before breakfast, lunch, and dinner. (Patient taking differently: Take 30 mg by mouth daily. Daily) 90 Tab 0    acetaminophen (TYLENOL) 325 mg tablet Take 650 mg by mouth every eight (8) hours as needed for Pain.  dextran 70/hypromellose (ARTIFICIAL TEARS, PF, OP) Apply 2 Drops to eye as needed for Other (both eyes for dryness).  tamsulosin (FLOMAX) 0.4 mg capsule Take 0.4 mg by mouth every evening. Past History     Past Medical History:  Past Medical History:   Diagnosis Date    Brain aneurysm     Brain aneurysm     Cancer ([facility]     prostate    [facility]est[facility] failure[facility]     CKD (chronic kidney disease)     Closed nondisplaced supracondylar fracture of lower end of left femur without intracondylar extension with delayed healing     COPD (chronic obstructive pulmonary disease) ([facility]     Debility     History of home oxygen therapy     [place] (hard of hearing)     Hypertension     Nocturia     On home oxygen therapy     PAF [facility]paroxysmal atrial fibrillation) ([facility])     Pneumonia     Prostate CA ([facility]     Prostate neoplasm     Radiation effect        Past Surgical History:  Past Surgical History:   Procedure Laterality Date    HX HERNIA REPAIR      HX HIP ARTHROSCOPY  04/09/2021       Family History:  Family History   Problem Relation Age of Onset    Heart Disease Mother        Social History:  Social History     Tobacco Use    Smoking status: Former Smoker     Types: Cigarettes    Smokeless tobacco: Never Used   Substance Use Topics    Alcohol use: No    Drug use: Never       Allergies: Allergies   Allergen Reactions    Aspirin Other (comments)     \"messes my stomach up\"    Bactrim [Sulfamethoxazole-Trimethoprim] Unknown (comments)         Review of Systems   Review of Systems   Constitutional: Negative for fever. Respiratory: Negative for shortness of breath. Cardiovascular: Negative for chest pain.    Gastrointestinal: Negative for abdominal pain. All other systems reviewed and are negative. Physical Exam     Vitals:    09/10/21 0809 09/10/21 0814 09/10/21 0815 09/10/21 0817   BP:   109/60    Pulse: 86  85 76   Resp: 27  23 24   Temp:  97.9 °F (36.6 °C)     SpO2: 95%  91% 95%     Physical Exam    Nursing notes and vital signs reviewed    Constitutional: Non toxic, elderly appearing, moderate distress  Head: Normocephalic, Atraumatic  Eyes: EOMI  Neck: Supple  Cardiovascular: Regular rate and rhythm, no murmurs, rubs, or gallops  Chest: Normal work of breathing and chest excursion bilaterally  Lungs: Clear to ausculation bilaterally  Back: No evidence of trauma or deformity  Extremities: Wounds on bilateral lower extremities that are dressed with clean dry dressings  Skin: Warm and dry, normal cap refill  Neuro: Alert and appropriate  Psychiatric: Normal mood and affect      Diagnostic Study Results     Labs -   No results found for this or any previous visit (from the past 12 hour(s)). Radiologic Studies -   No orders to display     CT Results  (Last 48 hours)    None        CXR Results  (Last 48 hours)    None          Medications given in the ED-  Medications   casirivimab-imdevimab (REGEN-COV) 600 mg in 0.9% sodium chloride 110 mL IVPB ( IntraVENous IV Completed 9/10/21 1199)         Medical Decision Making   I am the first provider for this patient. I reviewed the vital signs, available nursing notes, past medical history, past surgical history, family history and social history. Vital Signs-Reviewed the patient's vital signs. Pulse Oximetry Analysis - 95% on room air, not hypoxic     Records Reviewed: Nursing Notes and Old Medical Records    Provider Notes (Medical Decision Making): Melyssa Unger is a 64 y.o. male presenting for monoclonal antibody infusion in setting of diagnosis of COVID-19 infection with significant comorbidities.   Patient is hemodynamically stable and saturating well on room air without evidence of respiratory distress. He has received monoclonal infusion without any adverse reaction. Plan for discharge back to nursing facility with primary care follow-up and return precautions. Procedures:  Procedures    ED Course:       Diagnosis and Disposition     Critical Care: None    DISCHARGE NOTE:    Brandy Park's  results have been reviewed with him. He has been counseled regarding his diagnosis, treatment, and plan. He verbally conveys understanding and agreement of the signs, symptoms, diagnosis, treatment and prognosis and additionally agrees to follow up as discussed. He also agrees with the care-plan and conveys that all of his questions have been answered. I have also provided discharge instructions for him that include: educational information regarding their diagnosis and treatment, and list of reasons why they would want to return to the ED prior to their follow-up appointment, should his condition change. He has been provided with education for proper emergency department utilization. CLINICAL IMPRESSION:    1. COVID-19        PLAN:  1. D/C Home  2. Current Discharge Medication List        3. Follow-up Information     Follow up With Specialties Details Why Contact Info    Kendra Downey MD Family Medicine Schedule an appointment as soon as possible for a visit   [address] [facility] [address] [facility] [address]  [phone]      THE [facility] EMERGENCY DEPT Emergency Medicine  If symptoms worsen [address] [facility] [address]  [phone]        _______________________________      Please note that this dictation was completed with [employer], the computer voice recognition software. Quite often unanticipated grammatical, syntax, homophones, and other interpretive errors are inadvertently transcribed by the computer software. Please disregard these errors. Please excuse any errors that have escaped final proofreading.

## 2021-09-15 ENCOUNTER — HOSPITAL ENCOUNTER (OUTPATIENT)
Dept: LAB | Age: 76
Discharge: HOME | End: 2021-09-15

## 2021-09-15 LAB
ALBUMIN SERPL-MCNC: 2.3 G/DL (ref 3.4–5)
ALBUMIN/GLOB SERPL: 0.7 {RATIO} (ref 0.8–1.7)
ALP SERPL-CCNC: 59 U/L (ref 45–117)
ALT SERPL-CCNC: 17 U/L (ref 16–61)
ANION GAP SERPL CALC-SCNC: 6 MMOL/L (ref 3–18)
AST SERPL-CCNC: 15 U/L (ref 10–38)
BASOPHILS # BLD: 0 K/UL (ref 0–0.1)
BASOPHILS NFR BLD: 0 % (ref 0–2)
BILIRUB SERPL-MCNC: 0.3 MG/DL (ref 0.2–1)
BUN SERPL-MCNC: 49 MG/DL (ref 7–18)
BUN/CREAT SERPL: 36 (ref 12–20)
CALCIUM SERPL-MCNC: 7.6 MG/DL (ref 8.5–10.1)
CHLORIDE SERPL-SCNC: 109 MMOL/L (ref 100–111)
CO2 SERPL-SCNC: 30 MMOL/L (ref 21–32)
CREAT SERPL-MCNC: 1.38 MG/DL (ref 0.6–1.3)
DIFFERENTIAL METHOD BLD: ABNORMAL
EOSINOPHIL # BLD: 0 K/UL (ref 0–0.4)
EOSINOPHIL NFR BLD: 0 % (ref 0–5)
ERYTHROCYTE [DISTWIDTH] IN BLOOD BY AUTOMATED COUNT: 14.6 % (ref 11.6–14.5)
GLOBULIN SER CALC-MCNC: 3.5 G/DL (ref 2–4)
GLUCOSE SERPL-MCNC: 120 MG/DL (ref 74–99)
HCT VFR BLD AUTO: 33.8 % (ref 36–48)
HGB BLD-MCNC: 10.3 G/DL (ref 13–16)
LYMPHOCYTES # BLD: 0.5 K/UL (ref 0.9–3.6)
LYMPHOCYTES NFR BLD: 8 % (ref 21–52)
MAGNESIUM SERPL-MCNC: 2.1 MG/DL (ref 1.6–2.6)
MCH RBC QN AUTO: 27.2 PG (ref 24–34)
MCHC RBC AUTO-ENTMCNC: 30.5 G/DL (ref 31–37)
MCV RBC AUTO: 89.4 FL (ref 78–100)
MONOCYTES # BLD: 0.4 K/UL (ref 0.05–1.2)
MONOCYTES NFR BLD: 7 % (ref 3–10)
NEUTS SEG # BLD: 5.3 K/UL (ref 1.8–8)
NEUTS SEG NFR BLD: 83 % (ref 40–73)
PLATELET # BLD AUTO: 425 K/UL (ref 135–420)
PMV BLD AUTO: 9.2 FL (ref 9.2–11.8)
POTASSIUM SERPL-SCNC: 4.6 MMOL/L (ref 3.5–5.5)
PROT SERPL-MCNC: 5.8 G/DL (ref 6.4–8.2)
RBC # BLD AUTO: 3.78 M/UL (ref 4.35–5.65)
SODIUM SERPL-SCNC: 145 MMOL/L (ref 136–145)
WBC # BLD AUTO: 6.3 K/UL (ref 4.6–13.2)

## 2021-09-15 PROCEDURE — 80053 COMPREHEN METABOLIC PANEL: CPT

## 2021-09-15 PROCEDURE — 83735 ASSAY OF MAGNESIUM: CPT

## 2021-09-15 PROCEDURE — 85025 COMPLETE CBC W/AUTO DIFF WBC: CPT

## 2021-09-16 ENCOUNTER — PATIENT OUTREACH (OUTPATIENT)
Dept: CASE MANAGEMENT | Age: 76
End: 2021-09-16

## 2021-09-20 NOTE — PROGRESS NOTES
Post Acute Facility Update     *The information contained in this note was received during a weekly care coordination call with the post acute facility*    This patient was discharged from [place] [address] to [facility] [place] ([facility])   on 7/15/21. Hospital Discharge Diagnosis per Dr. Rosina Galvan:    Fracture left Femur  Open wound Left Hip  Hip replacement Left Hip  Chronic Resp Failure with Hypoxia  CKD  COPD  HTN    Current Facility: [address] ([facility])  Anticipated Discharge Date: D    Facility Nursing Update: WBAT now able to bear weight on graft sites. Will resume therapy this week - eval being completed today. Facility Social Work Update: lives with wife  Bundle Program Status: none  Upper Extremity Assistance: Stand by Assist - Presence and Cueing  Lower Extremity Assistance: Moderate Assistance   Bed Mobility: Stand by Assist - Presence and Cueing  Transfers: Minimal Assistance   Ambulation: Dependent  How far (in feet) is the patient ambulating?  None at this time  Device Used:    Barriers to Discharge: lives w/ wife, on home oxygen  Other:  none

## 2021-09-23 ENCOUNTER — PATIENT OUTREACH (OUTPATIENT)
Dept: CASE MANAGEMENT | Age: 76
End: 2021-09-23

## 2021-09-23 NOTE — PROGRESS NOTES
Post Acute Facility Update     *The information contained in this note was received during a weekly care coordination call with the post acute facility*    This patient was discharged from [facility] to [facility] [place] ([facility])   on 7/15/21. Hospital Discharge Diagnosis per Dr. Salazar Stage:    Fracture left Femur  Open wound Left Hip  Hip replacement Left Hip  Chronic Resp Failure with Hypoxia  CKD  COPD  HTN    Current Facility: [address] ([facility])  Anticipated Discharge Date: 9/30/21    Facility Nursing Update: WBAT now able to bear weight on graft sites. Facility Social Work Update: lives with wife, planning on d/c next week, home with wife and care givers at home  Bundle Program Status: none  Upper Extremity Assistance: Stand by Assist - Presence and Cueing  Lower Extremity Assistance: Moderate Assistance   Bed Mobility: Stand by Assist - Presence and Cueing  Transfers: Minimal Assistance   Ambulation: Moderate Assistance   How far (in feet) is the patient ambulating? 5 steps only   Device Used:    Barriers to Discharge: lives w/ wife, on home oxygen  Other:  None    Will need 24 care on discharge - will complete medicaid breanne to see if qualifies for PCA at home. Home w/ [facility] when discharged.

## 2021-09-24 ENCOUNTER — APPOINTMENT (OUTPATIENT)
Dept: WOUND CARE | Age: 76
End: 2021-09-24
Attending: PODIATRIST

## 2021-09-30 ENCOUNTER — APPOINTMENT (OUTPATIENT)
Dept: GENERAL RADIOLOGY | Age: 76
DRG: 191 | End: 2021-09-30
Attending: EMERGENCY MEDICINE
Payer: MEDICARE

## 2021-09-30 ENCOUNTER — HOSPITAL ENCOUNTER (INPATIENT)
Age: 76
LOS: 7 days | Discharge: HOME HEALTH | DRG: 191 | End: 2021-10-07
Attending: EMERGENCY MEDICINE | Admitting: HOSPITALIST
Payer: MEDICARE

## 2021-09-30 ENCOUNTER — PATIENT OUTREACH (OUTPATIENT)
Dept: CASE MANAGEMENT | Age: 76
End: 2021-09-30

## 2021-09-30 ENCOUNTER — HOME HEALTH ADMISSION (OUTPATIENT)
Dept: HOME HEALTH SERVICES | Facility: HOME HEALTH | Age: 76
End: 2021-09-30

## 2021-09-30 DIAGNOSIS — J44.1 COPD EXACERBATION (HCC): ICD-10-CM

## 2021-09-30 DIAGNOSIS — R50.9 FEBRILE ILLNESS: Primary | ICD-10-CM

## 2021-09-30 LAB
ALBUMIN SERPL-MCNC: 2 G/DL (ref 3.4–5)
ALBUMIN/GLOB SERPL: 0.6 {RATIO} (ref 0.8–1.7)
ALP SERPL-CCNC: 82 U/L (ref 45–117)
ALT SERPL-CCNC: 13 U/L (ref 16–61)
ANION GAP SERPL CALC-SCNC: 6 MMOL/L (ref 3–18)
APPEARANCE UR: CLEAR
AST SERPL-CCNC: 15 U/L (ref 10–38)
BACTERIA URNS QL MICRO: ABNORMAL /HPF
BASOPHILS # BLD: 0 K/UL (ref 0–0.1)
BASOPHILS NFR BLD: 0 % (ref 0–2)
BILIRUB SERPL-MCNC: 0.3 MG/DL (ref 0.2–1)
BILIRUB UR QL: NEGATIVE
BNP SERPL-MCNC: 475 PG/ML (ref 0–1800)
BUN SERPL-MCNC: 27 MG/DL (ref 7–18)
BUN/CREAT SERPL: 16 (ref 12–20)
CALCIUM SERPL-MCNC: 8.3 MG/DL (ref 8.5–10.1)
CHLORIDE SERPL-SCNC: 104 MMOL/L (ref 100–111)
CK MB CFR SERPL CALC: 6 % (ref 0–4)
CK MB SERPL-MCNC: 1.5 NG/ML (ref 5–25)
CK SERPL-CCNC: 25 U/L (ref 39–308)
CO2 SERPL-SCNC: 28 MMOL/L (ref 21–32)
COLOR UR: YELLOW
COVID-19 RAPID TEST, COVR: NOT DETECTED
CREAT SERPL-MCNC: 1.64 MG/DL (ref 0.6–1.3)
DIFFERENTIAL METHOD BLD: ABNORMAL
EOSINOPHIL # BLD: 0.4 K/UL (ref 0–0.4)
EOSINOPHIL NFR BLD: 3 % (ref 0–5)
EPITH CASTS URNS QL MICRO: NEGATIVE /LPF (ref 0–5)
ERYTHROCYTE [DISTWIDTH] IN BLOOD BY AUTOMATED COUNT: 14.7 % (ref 11.6–14.5)
FLUAV AG NPH QL IA: NEGATIVE
FLUBV AG NOSE QL IA: NEGATIVE
GLOBULIN SER CALC-MCNC: 3.6 G/DL (ref 2–4)
GLUCOSE SERPL-MCNC: 102 MG/DL (ref 74–99)
GLUCOSE UR STRIP.AUTO-MCNC: NEGATIVE MG/DL
HCT VFR BLD AUTO: 28.2 % (ref 36–48)
HGB BLD-MCNC: 8.9 G/DL (ref 13–16)
HGB UR QL STRIP: NEGATIVE
KETONES UR QL STRIP.AUTO: NEGATIVE MG/DL
LACTATE SERPL-SCNC: 1.6 MMOL/L (ref 0.4–2)
LEUKOCYTE ESTERASE UR QL STRIP.AUTO: NEGATIVE
LYMPHOCYTES # BLD: 0.7 K/UL (ref 0.9–3.6)
LYMPHOCYTES NFR BLD: 6 % (ref 21–52)
MCH RBC QN AUTO: 26.6 PG (ref 24–34)
MCHC RBC AUTO-ENTMCNC: 31.6 G/DL (ref 31–37)
MCV RBC AUTO: 84.4 FL (ref 78–100)
MONOCYTES # BLD: 0.8 K/UL (ref 0.05–1.2)
MONOCYTES NFR BLD: 6 % (ref 3–10)
NEUTS SEG # BLD: 9.9 K/UL (ref 1.8–8)
NEUTS SEG NFR BLD: 83 % (ref 40–73)
NITRITE UR QL STRIP.AUTO: NEGATIVE
PH UR STRIP: 5 [PH] (ref 5–8)
PLATELET # BLD AUTO: 201 K/UL (ref 135–420)
PMV BLD AUTO: 10.3 FL (ref 9.2–11.8)
POTASSIUM SERPL-SCNC: 4.2 MMOL/L (ref 3.5–5.5)
PROT SERPL-MCNC: 5.6 G/DL (ref 6.4–8.2)
PROT UR STRIP-MCNC: 30 MG/DL
RBC # BLD AUTO: 3.34 M/UL (ref 4.35–5.65)
RBC #/AREA URNS HPF: ABNORMAL /HPF (ref 0–5)
SODIUM SERPL-SCNC: 138 MMOL/L (ref 136–145)
SOURCE, COVRS: NORMAL
SP GR UR REFRACTOMETRY: 1.02 (ref 1–1.03)
TROPONIN I SERPL-MCNC: <0.02 NG/ML (ref 0–0.04)
UROBILINOGEN UR QL STRIP.AUTO: 1 EU/DL (ref 0.2–1)
WBC # BLD AUTO: 12 K/UL (ref 4.6–13.2)
WBC URNS QL MICRO: ABNORMAL /HPF (ref 0–5)

## 2021-09-30 PROCEDURE — 83880 ASSAY OF NATRIURETIC PEPTIDE: CPT

## 2021-09-30 PROCEDURE — 74011250637 HC RX REV CODE- 250/637: Performed by: EMERGENCY MEDICINE

## 2021-09-30 PROCEDURE — 99218 HC RM OBSERVATION: CPT

## 2021-09-30 PROCEDURE — 82553 CREATINE MB FRACTION: CPT

## 2021-09-30 PROCEDURE — 94762 N-INVAS EAR/PLS OXIMTRY CONT: CPT

## 2021-09-30 PROCEDURE — 87040 BLOOD CULTURE FOR BACTERIA: CPT

## 2021-09-30 PROCEDURE — 96365 THER/PROPH/DIAG IV INF INIT: CPT

## 2021-09-30 PROCEDURE — 93005 ELECTROCARDIOGRAM TRACING: CPT

## 2021-09-30 PROCEDURE — 85025 COMPLETE CBC W/AUTO DIFF WBC: CPT

## 2021-09-30 PROCEDURE — 83605 ASSAY OF LACTIC ACID: CPT

## 2021-09-30 PROCEDURE — 65270000029 HC RM PRIVATE

## 2021-09-30 PROCEDURE — 87635 SARS-COV-2 COVID-19 AMP PRB: CPT

## 2021-09-30 PROCEDURE — 71045 X-RAY EXAM CHEST 1 VIEW: CPT

## 2021-09-30 PROCEDURE — 81001 URINALYSIS AUTO W/SCOPE: CPT

## 2021-09-30 PROCEDURE — 74011250636 HC RX REV CODE- 250/636: Performed by: EMERGENCY MEDICINE

## 2021-09-30 PROCEDURE — 74011000258 HC RX REV CODE- 258: Performed by: EMERGENCY MEDICINE

## 2021-09-30 PROCEDURE — 72170 X-RAY EXAM OF PELVIS: CPT

## 2021-09-30 PROCEDURE — 87804 INFLUENZA ASSAY W/OPTIC: CPT

## 2021-09-30 PROCEDURE — 99285 EMERGENCY DEPT VISIT HI MDM: CPT

## 2021-09-30 PROCEDURE — 80053 COMPREHEN METABOLIC PANEL: CPT

## 2021-09-30 PROCEDURE — 96375 TX/PRO/DX INJ NEW DRUG ADDON: CPT

## 2021-09-30 RX ORDER — ACETAMINOPHEN 325 MG/1
650 TABLET ORAL
Status: COMPLETED | OUTPATIENT
Start: 2021-09-30 | End: 2021-09-30

## 2021-09-30 RX ADMIN — PIPERACILLIN AND TAZOBACTAM 3.38 G: 3; .375 INJECTION, POWDER, LYOPHILIZED, FOR SOLUTION INTRAVENOUS at 21:08

## 2021-09-30 RX ADMIN — METHYLPREDNISOLONE SODIUM SUCCINATE 125 MG: 125 INJECTION, POWDER, FOR SOLUTION INTRAMUSCULAR; INTRAVENOUS at 21:08

## 2021-09-30 RX ADMIN — ACETAMINOPHEN 650 MG: 325 TABLET ORAL at 21:08

## 2021-10-01 ENCOUNTER — APPOINTMENT (OUTPATIENT)
Dept: NUCLEAR MEDICINE | Age: 76
DRG: 191 | End: 2021-10-01
Attending: INTERNAL MEDICINE
Payer: MEDICARE

## 2021-10-01 ENCOUNTER — APPOINTMENT (OUTPATIENT)
Dept: CT IMAGING | Age: 76
DRG: 191 | End: 2021-10-01
Attending: INTERNAL MEDICINE
Payer: MEDICARE

## 2021-10-01 ENCOUNTER — APPOINTMENT (OUTPATIENT)
Dept: GENERAL RADIOLOGY | Age: 76
DRG: 191 | End: 2021-10-01
Attending: INTERNAL MEDICINE
Payer: MEDICARE

## 2021-10-01 ENCOUNTER — APPOINTMENT (OUTPATIENT)
Dept: VASCULAR SURGERY | Age: 76
DRG: 191 | End: 2021-10-01
Attending: INTERNAL MEDICINE
Payer: MEDICARE

## 2021-10-01 PROBLEM — I82.409 DVT (DEEP VENOUS THROMBOSIS) (HCC): Status: ACTIVE | Noted: 2021-10-01

## 2021-10-01 LAB
APTT PPP: 37 SEC (ref 23–36.4)
ATRIAL RATE: 101 BPM
CALCULATED P AXIS, ECG09: 68 DEGREES
CALCULATED R AXIS, ECG10: 40 DEGREES
CALCULATED T AXIS, ECG11: 68 DEGREES
DIAGNOSIS, 93000: NORMAL
P-R INTERVAL, ECG05: 150 MS
Q-T INTERVAL, ECG07: 340 MS
QRS DURATION, ECG06: 74 MS
QTC CALCULATION (BEZET), ECG08: 440 MS
VENTRICULAR RATE, ECG03: 101 BPM

## 2021-10-01 PROCEDURE — 73610 X-RAY EXAM OF ANKLE: CPT

## 2021-10-01 PROCEDURE — 87186 SC STD MICRODIL/AGAR DIL: CPT

## 2021-10-01 PROCEDURE — 74011250636 HC RX REV CODE- 250/636: Performed by: INTERNAL MEDICINE

## 2021-10-01 PROCEDURE — 2709999900 HC NON-CHARGEABLE SUPPLY

## 2021-10-01 PROCEDURE — 74176 CT ABD & PELVIS W/O CONTRAST: CPT

## 2021-10-01 PROCEDURE — 85730 THROMBOPLASTIN TIME PARTIAL: CPT

## 2021-10-01 PROCEDURE — 65270000029 HC RM PRIVATE

## 2021-10-01 PROCEDURE — 74011000258 HC RX REV CODE- 258: Performed by: INTERNAL MEDICINE

## 2021-10-01 PROCEDURE — 87070 CULTURE OTHR SPECIMN AEROBIC: CPT

## 2021-10-01 PROCEDURE — 74011250637 HC RX REV CODE- 250/637: Performed by: INTERNAL MEDICINE

## 2021-10-01 PROCEDURE — 99218 HC RM OBSERVATION: CPT

## 2021-10-01 PROCEDURE — 73630 X-RAY EXAM OF FOOT: CPT

## 2021-10-01 PROCEDURE — 77030040392 HC DRSG OPTIFOAM MDII -A

## 2021-10-01 PROCEDURE — 94640 AIRWAY INHALATION TREATMENT: CPT

## 2021-10-01 PROCEDURE — 74011000250 HC RX REV CODE- 250: Performed by: INTERNAL MEDICINE

## 2021-10-01 PROCEDURE — 87077 CULTURE AEROBIC IDENTIFY: CPT

## 2021-10-01 PROCEDURE — 93970 EXTREMITY STUDY: CPT

## 2021-10-01 PROCEDURE — 74011250637 HC RX REV CODE- 250/637: Performed by: HOSPITALIST

## 2021-10-01 PROCEDURE — 94760 N-INVAS EAR/PLS OXIMETRY 1: CPT

## 2021-10-01 PROCEDURE — 77010033678 HC OXYGEN DAILY

## 2021-10-01 PROCEDURE — 97602 WOUND(S) CARE NON-SELECTIVE: CPT

## 2021-10-01 RX ORDER — FUROSEMIDE 20 MG/1
20 TABLET ORAL DAILY
Status: DISCONTINUED | OUTPATIENT
Start: 2021-10-01 | End: 2021-10-07 | Stop reason: HOSPADM

## 2021-10-01 RX ORDER — ENOXAPARIN SODIUM 100 MG/ML
40 INJECTION SUBCUTANEOUS DAILY
Status: DISCONTINUED | OUTPATIENT
Start: 2021-10-01 | End: 2021-10-01

## 2021-10-01 RX ORDER — FAMOTIDINE 20 MG/1
20 TABLET, FILM COATED ORAL DAILY
Status: DISCONTINUED | OUTPATIENT
Start: 2021-10-01 | End: 2021-10-07 | Stop reason: HOSPADM

## 2021-10-01 RX ORDER — LANOLIN ALCOHOL/MO/W.PET/CERES
325 CREAM (GRAM) TOPICAL 2 TIMES DAILY WITH MEALS
Status: DISCONTINUED | OUTPATIENT
Start: 2021-10-01 | End: 2021-10-07 | Stop reason: HOSPADM

## 2021-10-01 RX ORDER — ACETAMINOPHEN 650 MG/1
650 SUPPOSITORY RECTAL
Status: DISCONTINUED | OUTPATIENT
Start: 2021-10-01 | End: 2021-10-07 | Stop reason: HOSPADM

## 2021-10-01 RX ORDER — ARFORMOTEROL TARTRATE 15 UG/2ML
15 SOLUTION RESPIRATORY (INHALATION)
Status: DISCONTINUED | OUTPATIENT
Start: 2021-10-01 | End: 2021-10-07 | Stop reason: HOSPADM

## 2021-10-01 RX ORDER — ONDANSETRON 4 MG/1
4 TABLET, ORALLY DISINTEGRATING ORAL
Status: DISCONTINUED | OUTPATIENT
Start: 2021-10-01 | End: 2021-10-07 | Stop reason: HOSPADM

## 2021-10-01 RX ORDER — BUDESONIDE 0.5 MG/2ML
500 INHALANT ORAL
Status: DISCONTINUED | OUTPATIENT
Start: 2021-10-01 | End: 2021-10-07 | Stop reason: HOSPADM

## 2021-10-01 RX ORDER — IPRATROPIUM BROMIDE AND ALBUTEROL SULFATE 2.5; .5 MG/3ML; MG/3ML
3 SOLUTION RESPIRATORY (INHALATION)
Status: DISCONTINUED | OUTPATIENT
Start: 2021-10-01 | End: 2021-10-07 | Stop reason: HOSPADM

## 2021-10-01 RX ORDER — ACETAMINOPHEN 325 MG/1
650 TABLET ORAL
Status: DISCONTINUED | OUTPATIENT
Start: 2021-10-01 | End: 2021-10-02 | Stop reason: ALTCHOICE

## 2021-10-01 RX ORDER — BUDESONIDE 0.5 MG/2ML
500 INHALANT ORAL 2 TIMES DAILY
Status: DISCONTINUED | OUTPATIENT
Start: 2021-10-01 | End: 2021-10-01

## 2021-10-01 RX ORDER — SAME BUTANEDISULFONATE/BETAINE 400-600 MG
250 POWDER IN PACKET (EA) ORAL 2 TIMES DAILY
Status: DISCONTINUED | OUTPATIENT
Start: 2021-10-01 | End: 2021-10-07 | Stop reason: HOSPADM

## 2021-10-01 RX ORDER — ALBUTEROL SULFATE 90 UG/1
2 AEROSOL, METERED RESPIRATORY (INHALATION)
Status: DISCONTINUED | OUTPATIENT
Start: 2021-10-01 | End: 2021-10-07 | Stop reason: HOSPADM

## 2021-10-01 RX ORDER — DILTIAZEM HYDROCHLORIDE 30 MG/1
30 TABLET, FILM COATED ORAL
Status: DISCONTINUED | OUTPATIENT
Start: 2021-10-01 | End: 2021-10-07 | Stop reason: HOSPADM

## 2021-10-01 RX ORDER — TAMSULOSIN HYDROCHLORIDE 0.4 MG/1
0.4 CAPSULE ORAL EVERY EVENING
Status: DISCONTINUED | OUTPATIENT
Start: 2021-10-01 | End: 2021-10-07 | Stop reason: HOSPADM

## 2021-10-01 RX ORDER — ONDANSETRON 2 MG/ML
4 INJECTION INTRAMUSCULAR; INTRAVENOUS
Status: DISCONTINUED | OUTPATIENT
Start: 2021-10-01 | End: 2021-10-07 | Stop reason: HOSPADM

## 2021-10-01 RX ORDER — ASCORBIC ACID 250 MG
500 TABLET ORAL DAILY
Status: DISCONTINUED | OUTPATIENT
Start: 2021-10-02 | End: 2021-10-07 | Stop reason: HOSPADM

## 2021-10-01 RX ORDER — HEPARIN SODIUM 10000 [USP'U]/100ML
18-36 INJECTION, SOLUTION INTRAVENOUS
Status: DISPENSED | OUTPATIENT
Start: 2021-10-01 | End: 2021-10-07

## 2021-10-01 RX ORDER — SODIUM CHLORIDE 0.9 % (FLUSH) 0.9 %
5-40 SYRINGE (ML) INJECTION EVERY 8 HOURS
Status: DISCONTINUED | OUTPATIENT
Start: 2021-10-01 | End: 2021-10-01

## 2021-10-01 RX ORDER — POLYETHYLENE GLYCOL 3350 17 G/17G
17 POWDER, FOR SOLUTION ORAL DAILY PRN
Status: DISCONTINUED | OUTPATIENT
Start: 2021-10-01 | End: 2021-10-07 | Stop reason: HOSPADM

## 2021-10-01 RX ORDER — SODIUM CHLORIDE 0.9 % (FLUSH) 0.9 %
5-40 SYRINGE (ML) INJECTION AS NEEDED
Status: DISCONTINUED | OUTPATIENT
Start: 2021-10-01 | End: 2021-10-01

## 2021-10-01 RX ORDER — ACETAMINOPHEN 325 MG/1
650 TABLET ORAL
Status: DISCONTINUED | OUTPATIENT
Start: 2021-10-01 | End: 2021-10-07 | Stop reason: HOSPADM

## 2021-10-01 RX ORDER — ARFORMOTEROL TARTRATE 15 UG/2ML
15 SOLUTION RESPIRATORY (INHALATION) 2 TIMES DAILY
Status: DISCONTINUED | OUTPATIENT
Start: 2021-10-01 | End: 2021-10-01

## 2021-10-01 RX ORDER — AMLODIPINE BESYLATE 5 MG/1
5 TABLET ORAL DAILY
Status: CANCELLED | OUTPATIENT
Start: 2021-10-01

## 2021-10-01 RX ADMIN — Medication 10 ML: at 18:20

## 2021-10-01 RX ADMIN — FERROUS SULFATE TAB 325 MG (65 MG ELEMENTAL FE) 325 MG: 325 (65 FE) TAB at 18:17

## 2021-10-01 RX ADMIN — DILTIAZEM HYDROCHLORIDE 30 MG: 30 TABLET, FILM COATED ORAL at 08:29

## 2021-10-01 RX ADMIN — TAMSULOSIN HYDROCHLORIDE 0.4 MG: 0.4 CAPSULE ORAL at 18:17

## 2021-10-01 RX ADMIN — Medication 10 ML: at 01:00

## 2021-10-01 RX ADMIN — DILTIAZEM HYDROCHLORIDE 30 MG: 30 TABLET, FILM COATED ORAL at 18:19

## 2021-10-01 RX ADMIN — PIPERACILLIN AND TAZOBACTAM 3.38 G: 3; .375 INJECTION, POWDER, LYOPHILIZED, FOR SOLUTION INTRAVENOUS at 01:01

## 2021-10-01 RX ADMIN — DOXYCYCLINE 100 MG: 100 INJECTION, POWDER, LYOPHILIZED, FOR SOLUTION INTRAVENOUS at 13:26

## 2021-10-01 RX ADMIN — PIPERACILLIN AND TAZOBACTAM 3.38 G: 3; .375 INJECTION, POWDER, LYOPHILIZED, FOR SOLUTION INTRAVENOUS at 12:29

## 2021-10-01 RX ADMIN — FUROSEMIDE 20 MG: 20 TABLET ORAL at 08:29

## 2021-10-01 RX ADMIN — HEPARIN SODIUM 18 UNITS/KG/HR: 10000 INJECTION, SOLUTION INTRAVENOUS at 22:34

## 2021-10-01 RX ADMIN — PIPERACILLIN AND TAZOBACTAM 3.38 G: 3; .375 INJECTION, POWDER, LYOPHILIZED, FOR SOLUTION INTRAVENOUS at 18:17

## 2021-10-01 RX ADMIN — BUDESONIDE 500 MCG: 0.5 INHALANT RESPIRATORY (INHALATION) at 20:14

## 2021-10-01 RX ADMIN — COLLAGENASE SANTYL: 250 OINTMENT TOPICAL at 08:30

## 2021-10-01 RX ADMIN — DOXYCYCLINE 100 MG: 100 INJECTION, POWDER, LYOPHILIZED, FOR SOLUTION INTRAVENOUS at 01:00

## 2021-10-01 RX ADMIN — FAMOTIDINE 20 MG: 20 TABLET ORAL at 08:29

## 2021-10-01 RX ADMIN — PIPERACILLIN AND TAZOBACTAM 3.38 G: 3; .375 INJECTION, POWDER, LYOPHILIZED, FOR SOLUTION INTRAVENOUS at 08:27

## 2021-10-01 RX ADMIN — Medication 250 MG: at 20:54

## 2021-10-01 RX ADMIN — ARFORMOTEROL TARTRATE 15 MCG: 15 SOLUTION RESPIRATORY (INHALATION) at 07:17

## 2021-10-01 RX ADMIN — FERROUS SULFATE TAB 325 MG (65 MG ELEMENTAL FE) 325 MG: 325 (65 FE) TAB at 08:29

## 2021-10-01 RX ADMIN — ARFORMOTEROL TARTRATE 15 MCG: 15 SOLUTION RESPIRATORY (INHALATION) at 20:14

## 2021-10-01 RX ADMIN — BUDESONIDE 500 MCG: 0.5 INHALANT RESPIRATORY (INHALATION) at 07:18

## 2021-10-01 RX ADMIN — ENOXAPARIN SODIUM 40 MG: 40 INJECTION SUBCUTANEOUS at 08:29

## 2021-10-01 RX ADMIN — DILTIAZEM HYDROCHLORIDE 30 MG: 30 TABLET, FILM COATED ORAL at 12:29

## 2021-10-01 RX ADMIN — Medication 10 ML: at 06:00

## 2021-10-01 NOTE — PROGRESS NOTES
Started 3 phase bone scan at 1200 and will need patient back in Nuclear medicine department at 1430 to finish examination.

## 2021-10-01 NOTE — PROGRESS NOTES
Hospitalist Progress Note    Patient: Bobbi Horton MRN: [id number]  CSN: [id number]    YOB: 1945  Age: 64 y.o. Sex: male    DOA: 9/30/2021 LOS:  LOS: 0 days                Assessment/Plan     Patient Active Problem List   Diagnosis Code    Hypertension I10    Chronic obstructive pulmonary disease ([facility] J44.9    Chronic renal disease, stage 3, moderately decreased glomerular filtration rate between 30-59 mL/min/1.73 square meter ([facility]) N18.30    Age-related physical debility R54    Chronic respiratory failure with hypoxia ([facility]) J96.11    Tobacco dependence F17.200    Left hip pain M25.552    PAF (paroxysmal atrial fibrillation) ([facility]) I48.0    Avascular necrosis of bone of left hip ([facility]) M87.052    Obesity E66.9    Acute pulmonary edema ([facility]) J81.0    Anemia D64.9    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 02XA    Scrotal edema N50.89    Open wound of left hip S71.002A    Intercondylar fracture of femur ([facility]) S72.413A    Left leg pain M79.605    Open wound of left ankle S91.002A    Infected wound T14. 8XXA, L08.9    Supplemental oxygen dependent Z99.81    MRSA (methicillin resistant staph aureus) culture positive Z22.322    Pyogenic inflammation of bone ([facility]) M86.9    Osteomyelitis of left ankle ([facility]) M86.9    Cellulitis of left ankle L03. 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 male with PMHX hypertension, COPD, CRD, prostate cancer, brain aneurysm, on home O2  due to chronic respiratory failure and medical noncompliance. fracture of left ankle.    Multiple admissions to this hospital.    Fever/sepsis    LE cellulitis -  Continue with antibiotics    Recent right ankle open wound, cultures growing MRSA on chronic doxycycline. ID and podiatry consulted    Right leg DVT -  Not started on anticoagulation secondary to significant hematuria. Start on anticoagulation once hematuria resolves. Hematuria -  Likely traumatic, if not improving then need urology consult. hypertension -  continue home medication     Chronic COPD with chronic respiratory failure and oxygen supplementation-  Resume home medication  DuoNebs as needed  Oxygen supplementation     Chronic renal disease stage III -  Avoid nephrotoxins  Creatinine at baseline  Normal electrolytes     Paroxysmal atrial fibrillation -  No acute issues  Resume home medication     Anemia -  Chronic, monitor H/H    Recent Intercondylar fracture of left distal femur -   Conservative management recommended.     GI prophylaxis with pepcid. Prognosis poor with multiple hospital admissions. Disposition : will need long term care. Review of systems  General: No fevers or chills. Cardiovascular: No chest pain or pressure. No palpitations. Pulmonary: No shortness of breath. Gastrointestinal: No nausea, vomiting. Physical Exam:  General: Awake, cooperative, no acute distress    HEENT: NC, Atraumatic. PERRLA, anicteric sclerae. Lungs: CTA Bilaterally. No Wheezing/Rhonchi/Rales. Heart:  S1 S2,  No murmur, No Rubs, No Gallops  Abdomen: Soft, Non distended, Non tender.  +Bowel sounds,   Extremities:   Psych:   Not anxious or agitated. Neurologic:  No acute neurological deficit.            Vital signs/Intake and Output:  Visit Vitals  BP (!) 115/56   Pulse 73   Temp 98 °F (36.7 °C)   Resp 22   Ht 5' 9.02\" (1.753 m)   Wt 91.6 kg (201 lb 14.4 oz)   SpO2 99%   BMI 29.80 kg/m²     Current Shift:  10/01 0701 - 10/01 1900  In: 240 [P.O.:240]  Out: 600 [Urine:600]  Last three shifts:  09/29 1901 - 10/01 0700  In: 100 [I.V.:100]  Out: -             Labs: Results:       Chemistry Recent Labs     09/30/21 2042   *      K 4.2    CO2 28   BUN 27*   CREA 1.64*   CA 8.3*   AGAP 6   BUCR 16   AP 82   TP 5.6*   ALB 2.0*   GLOB 3.6   AGRAT 0.6*      CBC w/Diff Recent Labs     09/30/21 2042   WBC 12.0   RBC 3.34*   HGB 8.9*   HCT 28.2*      GRANS 83*   LYMPH 6*   EOS 3      Cardiac Enzymes Recent Labs     09/30/21 2042   CPK 25*   CKND1 6.0*      Coagulation No results for input(s): PTP, INR, APTT, INREXT, INREXT in the last 72 hours. Lipid Panel No results found for: CHOL, CHOLPOCT, CHOLX, CHLST, CHOLV, 199050, HDL, HDLP, LDL, LDLC, DLDLP, 062377, VLDLC, VLDL, TGLX, TRIGL, TRIGP, TGLPOCT, CHHD, CHHDX   BNP No results for input(s): BNPP in the last 72 hours.    Liver Enzymes Recent Labs     09/30/21 2042   TP 5.6*   ALB 2.0*   AP 82      Thyroid Studies Lab Results   Component Value Date/Time    TSH 3.06 04/05/2021 01:30 PM        Procedures/imaging: see electronic medical records for all procedures/Xrays and details which were not copied into this note but were reviewed prior to creation of Plan

## 2021-10-01 NOTE — PROGRESS NOTES
Podiatry; Patient seen at bedside today for chronic left Achilles' Tendon wound. Patient had surgical debridement 6 weeks ago and Stravix graft to left Achilles' tendon wound that is still intact and looks stable. Some slight drainage so we cultured wound. Applied gauze dressing left ankle. No need at this time for surgical treatment. I will follow him as needed. See consult for details.

## 2021-10-01 NOTE — PROGRESS NOTES
PT orders received and chart was reviewed. Holding evaluation today per nursing as patient has an acute non-occlusive thrombus in his right proximal femoral vein. Will continue to follow.  Wayne Sterling, PT

## 2021-10-01 NOTE — PROGRESS NOTES
Assumed care of pt shortly after 0015. Pt A&Ox4, very [place]. Pt medicated as ordered. Stage III pressure ulcer noted in sacral area. Covered with mepilex. Excoriation in groin area also noted. Barrier cream applied. External catheter in place. No issues throughout the night. Pt remains afebrile. Daughter Brenda Jeffery updated about pt. All questions answered. Safety measures in place.

## 2021-10-01 NOTE — PROGRESS NOTES
Comprehensive Nutrition Assessment    Type and Reason for Visit: Initial, Wound (Pressure injury, unstageable)    Nutrition Recommendations/Plan: Suppelement: will order Justin BID to aid in wound healing. Will order daily MVI and daily vitamin C 500mg. Nutrition Assessment:  PMHx: COPD, COVID 23 (9/2021), osteomyelitis of ankle, HTN, CKD. patient admitted due to COPD exacerbation and febrile illness. recieved in basket for pressure injury-unstageable    Estimated Daily Nutrient Needs:  Energy (kcal): 9783-8106; Weight Used for Energy Requirements: Ideal  Protein (g): ; Weight Used for Protein Requirements: Current (1-1.2g due to pressure injury)  Fluid (ml/day): 3173-2257; Method Used for Fluid Requirements: 1 ml/kcal    Nutrition Related Findings:  Med: flomax, lasix, ferrous sulfate, pepcid. No BM noted at this time. PO per documentation % as of 10/1. Wounds:    Pressure injury, Unstageable       Current Nutrition Therapies:  ADULT DIET Regular; 4 carb choices (60 gm/meal); Low Fat/Low Chol/High Fiber/ELOY; No Salt Added (3-4 gm)    Anthropometric Measures:  · Height:  5' 9.02\" (175.3 cm)  · Current Body Wt:  91.6 kg (201 lb 15.1 oz)   · Admission Body Wt:  201 lb    · Usual Body Wt:  87.1 kg (192 lb) (Last RD note 8/25/2021)     · Ideal Body Wt:  160 lbs:  126.2 %    · BMI Category: Overweight (BMI 25.0-29. 9)       Nutrition Diagnosis:   · In context of acute illness or injury related to increased demand for energy/nutrients as evidenced by wounds (unstageable pressure injury)    Nutrition Interventions:   Food and/or Nutrient Delivery: Continue current diet, Start oral nutrition supplement, Vitamin supplement  Nutrition Education and Counseling: No recommendations at this time  Coordination of Nutrition Care: Continue to monitor while inpatient    Goals:  Start Justin BID within the next 24-48 hour.  PO intake >50% of most meals and supplements throughout the next 3-5 days       Nutrition Monitoring and Evaluation:   Behavioral-Environmental Outcomes: None identified  Food/Nutrient Intake Outcomes: Food and nutrient intake, Supplement intake, Vitamin/mineral intake  Physical Signs/Symptoms Outcomes: Biochemical data, Meal time behavior, Skin, Weight, Nausea/vomiting, GI status    Discharge Planning:    Continue current diet     Electronically signed by Keith Ramirez RD on 10/1/2021 at 1:56 PM

## 2021-10-01 NOTE — PROGRESS NOTES
Post Acute Facility Update     *The information contained in this note was received during a weekly care coordination call with the post acute facility*    This patient was discharged from [facility] to [facility] [place] ([facility])   on 7/15/21. Hospital Discharge Diagnosis per Dr. Roddy Goldberg:    Fracture left Femur  Open wound Left Hip  Hip replacement Left Hip  Chronic Resp Failure with Hypoxia  CKD  COPD  HTN    Current Facility: [address] ([facility])  Anticipated Discharge Date: 9/30/21    Facility Nursing Update: WBAT now able to bear weight on graft sites. Facility Social Work Update: lives with wife, to be d/c today home with wife and care givers at home  [facility]  Bundle Program Status: none  Upper Extremity Assistance: Stand by Assist - Presence and Cueing  Lower Extremity Assistance: Moderate Assistance   Bed Mobility: Stand by Assist - Presence and Cueing  Transfers: Minimal Assistance   Ambulation: Moderate Assistance   How far (in feet) is the patient ambulating? 5 steps only   Device Used:    Barriers to Discharge: lives w/ wife, on home oxygen  Other:  None    Will need 24 care on discharge - wife is working on completing medicaid breanne to see if qualifies for PCA at home.

## 2021-10-01 NOTE — WOUND CARE
IP WOUND CONSULT    Kana Lyon  MEDICAL RECORD NUMBER:  [id number]  AGE: 64 y.o. GENDER: male  : 1945  TODAY'S DATE:  10/1/2021    GENERAL     [] Follow-up   [x] New Consult    Kana Lyon is a 64 y.o. male referred by:   [x] Physician  [x] Nursing  [] Other:         PAST MEDICAL HISTORY    Past Medical History:   Diagnosis Date    Brain aneurysm     Brain aneurysm     Cancer ([facility]     prostate    CHF [facility]congestive heart failure) ([facility]     CKD (chronic kidney disease)     Closed nondisplaced supracondylar fracture of lower end of left femur without intracondylar extension with delayed healing     COPD [facility] obstructive pulmonary disease) [facility]     Debility     History of home oxygen therapy     [place] (hard of hearing)     Hypertension     Nocturia     On home oxygen therapy     PAF (paroxysmal atrial fibrillation) ([facility]     Pneumonia     Prostate CA ([facility]     Prostate neoplasm     Radiation effect         PAST SURGICAL HISTORY    Past Surgical History:   Procedure Laterality Date    HX HERNIA REPAIR      HX HIP ARTHROSCOPY  2021       FAMILY HISTORY    Family History   Problem Relation Age of Onset    Heart Disease Mother        SOCIAL HISTORY    Social History     Tobacco Use    Smoking status: Former Smoker     Types: Cigarettes    Smokeless tobacco: Never Used   Substance Use Topics    Alcohol use: No    Drug use: Never       ALLERGIES    Allergies   Allergen Reactions    Aspirin Other (comments)     \"messes my stomach up\"    Bactrim [Sulfamethoxazole-Trimethoprim] Unknown (comments)       MEDICATIONS    No current facility-administered medications on file prior to encounter. Current Outpatient Medications on File Prior to Encounter   Medication Sig Dispense Refill    collagenase (SANTYL) 250 unit/gram ointment Apply  to affected area daily. 30 g 1    arformoteroL (BROVANA) 15 mcg/2 mL nebu neb solution 2 mL by Nebulization route two (2) times a day.  30 Vial 0    budesonide (PULMICORT) 0.5 mg/2 mL nbsp 2 mL by Nebulization route two (2) times a day. 30 Each 0    enoxaparin (LOVENOX) 40 mg/0.4 mL 0.4 mL by SubCUTAneous route daily. 21 Syringe 0    famotidine (PEPCID) 20 mg tablet Take 1 Tab by mouth daily. 30 Tab 0    ferrous sulfate 325 mg (65 mg iron) tablet Take 1 Tab by mouth two (2) times daily (with meals). 30 Tab 0    amLODIPine (NORVASC) 5 mg tablet Take 1 Tab by mouth daily. 30 Tab 0    albuterol-ipratropium (DUO-NEB) 2.5 mg-0.5 mg/3 ml nebu 3 mL by Nebulization route every four (4) hours as needed for Wheezing. 30 Nebule 0    albuterol (PROVENTIL HFA, VENTOLIN HFA, PROAIR HFA) 90 mcg/actuation inhaler Take 2 Puffs by inhalation every four (4) hours as needed for Wheezing or Shortness of Breath. 1 Inhaler 1    OXYGEN-AIR DELIVERY SYSTEMS 2 L/min by Nasal route continuous.  furosemide (Lasix) 20 mg tablet Take 20 mg by mouth daily.  dilTIAZem (CARDIZEM) 30 mg tablet Take 1 Tab by mouth Before breakfast, lunch, and dinner. (Patient taking differently: Take 30 mg by mouth daily. Daily) 90 Tab 0    acetaminophen (TYLENOL) 325 mg tablet Take 650 mg by mouth every eight (8) hours as needed for Pain.  dextran 70/hypromellose (ARTIFICIAL TEARS, PF, OP) Apply 2 Drops to eye as needed for Other (both eyes for dryness).  tamsulosin (FLOMAX) 0.4 mg capsule Take 0.4 mg by mouth every evening.          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 and pressure to ankle     Contributing Factors: edema, chronic pressure, decreased mobility, shear force, incontinence of stool and incontinence of urine    Wound Hip Left (Active)   Number of days: 128       Wound Ankle Left;Posterior (Active)   Wound Image    10/01/21 1144   Wound Etiology Pressure Unstageable 10/01/21 1144   Dressing Status Breakthrough drainage noted 10/01/21 1144   Cleansed Wound cleanser 10/01/21 1144   Dressing/Treatment Petroleum gauze 10/01/21 1144   Dressing Change Due 10/04/21 10/01/21 1144   Wound Length (cm) 2 cm 10/01/21 1144   Wound Width (cm) 2.5 cm 10/01/21 1144   Wound Depth (cm) 0.1 cm 10/01/21 1144   Wound Surface Area (cm^2) 5 cm^2 10/01/21 1144   Change in Wound Size % 44.44 10/01/21 1144   Wound Volume (cm^3) 0.5 cm^3 10/01/21 1144   Wound Healing % 81 10/01/21 1144   Wound Assessment Graft 10/01/21 1144   Drainage Amount Small 10/01/21 1144   Drainage Description Serosanguinous 10/01/21 1144   Wound Odor None 10/01/21 1144   Camryn-Wound/Incision Assessment Blanchable erythema 10/01/21 1144   Edges Defined edges 10/01/21 1144   Wound Thickness Description Full thickness 10/01/21 1144   Number of days: 42       Wound Leg lower Lateral (Active)   Number of days: 42       Wound Foot Left;Lateral (Active)   Number of days: 42       Wound Buttocks Lower;Right excoriation lower right  (Active)   Wound Image   10/01/21 1144   Wound Etiology Other (Comment) 10/01/21 1144   Dressing Status Breakthrough drainage noted 10/01/21 1144   Cleansed Soap and water 10/01/21 1144   Dressing/Treatment Other (Comment) 10/01/21 1144   Dressing Change Due 10/01/21 10/01/21 1144   Wound Assessment Other (Comment) 10/01/21 1144   Drainage Amount Moderate 10/01/21 1144   Drainage Description Serosanguinous 10/01/21 1144   Wound Odor Fecal 10/01/21 1144   Camryn-Wound/Incision Assessment Erosion 10/01/21 1144   Edges Undefined edges 10/01/21 1144   Wound Thickness Description Full thickness 10/01/21 1144   Number of days: 37       Incision 08/23/21 Ankle Left (Active)   Number of days: 39       [REMOVED] Wound Leg upper Anterior;Left;Proximal large red skin tear (Removed)   Number of days: 74       [REMOVED] Incision 04/09/21 Hip Left (Removed)   Number of days: 75          PLAN     Skin Care & Pressure Relief Recommendations  Minimize layers of linen  Pads under patient to optimize support surface  Turn/reposition approximately every 2 hours  Pillow wedges  Manage incontinence   Offload heels pillows    Physician/Provider notified: Yes  Recommendations: adaptic to posterior ankle, gauze, abd, kerlix   Barrier cream to buttocks      Teaching completed with:   [x] Patient           [] Family member       [] Caregiver          [] Nursing  [x] Other    Patient/Caregiver Teaching:  Level of patient/caregiver understanding able to:   [x] Indicates understanding       [] Needs reinforcement  [] Unsuccessful      [] Verbal Understanding  [] Demonstrated understanding       [] No evidence of learning  [] Refused teaching         [] N/A       Electronically signed by Melody Nichols RN on 10/1/2021 at 11:58 AM

## 2021-10-01 NOTE — PROGRESS NOTES
Reason for Admission:  Fever; fall                     RUR Score:   N/A                  Plan for utilizing home health:     [facility]     PCP: First and Last name:  Lanre Tovar MD     Name of Practice:    Are you a current patient: Yes/No:    Approximate date of last visit:    Can you participate in a virtual visit with your PCP:                     Current Advanced Directive/Advance Care Plan: Partial Code      Healthcare Decision Maker:   Click here to complete [employer] including selection of the Healthcare Decision Maker Relationship (ie \"Primary\")             Primary Decision MakeLeopoldo Jeong - [phone]    Secondary Decision Maker: Cisco Angel - Tomas - [phone]                  Transition of Care Plan:     [facility] with physician follow up               Chart reviewed. Per H&P \"Dre Zambrano is a 64 y.o.  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 An attempt to get into his house after a prolonged hospital stay and rehab stay patient fell to the floor. Patient is nonambulatory but it was difficult to get him home. In the emergency room he was found to be wheezing with high fever and cough. When patient was questioned he denies any pain or discomfort. He agrees to stays in the hospital for further evaluation as he has a fever and has been difficult getting him home patient has been in and out of hospital consistently for several months. Repeat COVID-19 rapid test was negative in our hospital chest x-ray shows no pneumonia there is persistent redness of his lower extremity from his ankle injury dressing was changed where he had open wound with? osteomyelitis patient denies any significant pain. \"    CM spoke with pt's wife, Henrique Bautista ([phone]), to discuss transition of care. Pt's wife has indicated pt was discharged from [facility] yesterday with [facility]. Family would like pt to return to [facility] if possible, but is willing to have pt return home if [facility] is unable to accept. Clinical has been sent to this facility for review. CM to continue to follow and assist with transition of care. 0930:  CM has been notified [facility] is unable to accept this pt at this time. CM contacted pt's wife and have notified her of the above. Pt's wife is in agreement with having pt return home with [facility] when medically stable. Anticipate pt will need medical transport home when medically stable.   CM to continue to follow and assist.       Care Management Interventions  Mode of Transport at Discharge: S  Transition of Care Consult (CM Consult): Discharge Planning, [address]: Yes  Health Maintenance Reviewed: Yes  Physical Therapy Consult: Yes  Occupational Therapy Consult: Yes  Support Systems: Spouse/Significant Other, Child(prashant)  The Plan for Transition of Care is Related to the Following Treatment Goals :  [facility] with physician follow up       The Patient and/or Patient Representative was Provided with a Choice of Provider and Agrees with the Discharge Plan?: Yes  Name of the Patient Representative Who was Provided with a Choice of Provider and Agrees with the Discharge Plan: spouse  Freedom of Choice List was Provided with Basic Dialogue that Supports the Patient's Individualized Plan of Care/Goals, Treatment Preferences and Shares the Quality Data Associated with the Providers?: Yes  Discharge Location  Discharge Placement: Home with home health [facility] with physician follow up  )

## 2021-10-01 NOTE — H&P
History & Physical    Patient: Amish Gonzalez MRN: [id number]  CSN: [id number]    YOB: 1945  Age: 64 y.o. Sex: male      DOA: 9/30/2021    Chief Complaint:   Chief Complaint   Patient presents with    Fever    Fall          HPI:     Amish Gonzalez is a 64 y.o.  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 An attempt to get into his house after a prolonged hospital stay and rehab stay patient fell to the floor. Patient is nonambulatory but it was difficult to get him home. In the emergency room he was found to be wheezing with high fever and cough. When patient was questioned he denies any pain or discomfort. He agrees to stays in the hospital for further evaluation as he has a fever and has been difficult getting him home patient has been in and out of hospital consistently for several months.   Repeat COVID-19 rapid test was negative in our hospital chest x-ray shows no pneumonia there is persistent redness of his lower extremity from his ankle injury dressing was changed where he had open wound with? osteomyelitis patient denies any significant pain      Past Medical History:   Diagnosis Date    Brain aneurysm     Brain aneurysm     Cancer ([address]     prostate    CHF (congestive heart failure) (HCC)     CKD (chronic kidney disease)     Closed nondisplaced supracondylar fracture of lower end of left femur without intracondylar extension with delayed healing     COPD (chronic obstructive pulmonary disease) ([address]     Debility     History of home oxygen therapy     [place] (hard of hearing)     Hypertension     Nocturia     On home oxygen therapy     PAF (paroxysmal atrial fibrillation) ([address]     Pneumonia     Prostate CA ([address]     Prostate neoplasm     Radiation effect        Past Surgical History:   Procedure Laterality Date    HX HERNIA REPAIR      HX HIP ARTHROSCOPY  04/09/2021       Family History   Problem Relation Age of Onset    Heart Disease Mother        Social History     Socioeconomic History    Marital status:      Spouse name: Not on file    Number of children: Not on file    Years of education: Not on file    Highest education level: Not on file   Tobacco Use    Smoking status: Former Smoker     Types: Cigarettes    Smokeless tobacco: Never Used   Substance and Sexual Activity    Alcohol use: No    Drug use: Never     Social Determinants of Health     Financial Resource Strain:     Difficulty of Paying Living Expenses:    Food Insecurity:     Worried About Running Out of Food in the Last Year:     [address] in the Last Year:    Transportation Needs:     Lack of Transportation (Medical):  Lack of Transportation (Non-Medical):    Physical Activity:     Days of Exercise per Week:     Minutes of Exercise per Session:    Stress:     Feeling of Stress :    Social Connections:     Frequency of Communication with Friends and Family:     Frequency of Social Gatherings with Friends and Family:     Attends [employer] Services:     Active Member of Clubs or Organizations:     Attends Club or Organization Meetings:     Marital Status:        Prior to Admission medications    Medication Sig Start Date End Date Taking? Authorizing Provider   collagenase (SANTYL) 250 unit/gram ointment Apply  to affected area daily. 6/23/21   Tera Pak MD   arformoteroL (BROVANA) 15 mcg/2 mL nebu neb solution 2 mL by Nebulization route two (2) times a day. 4/14/21   Tanna Teague MD   budesonide (PULMICORT) 0.5 mg/2 mL nbsp 2 mL by Nebulization route two (2) times a day. 4/14/21   Tanna Teague MD   enoxaparin (LOVENOX) 40 mg/0.4 mL 0.4 mL by SubCUTAneous route daily.  4/14/21   Tanna Teague MD   famotidine (PEPCID) 20 mg tablet Take 1 Tab by mouth daily. 4/14/21   Umer Childress MD   ferrous sulfate 325 mg (65 mg iron) tablet Take 1 Tab by mouth two (2) times daily (with meals). 4/14/21   Umer Childress MD   amLODIPine (NORVASC) 5 mg tablet Take 1 Tab by mouth daily. 3/3/21   Froy Zapata MD   albuterol-ipratropium (DUO-NEB) 2.5 mg-0.5 mg/3 ml nebu 3 mL by Nebulization route every four (4) hours as needed for Wheezing. 2/7/21   Jonathan Jordan MD   albuterol (PROVENTIL HFA, VENTOLIN HFA, PROAIR HFA) 90 mcg/actuation inhaler Take 2 Puffs by inhalation every four (4) hours as needed for Wheezing or Shortness of Breath. 2/7/21   Jonathan Jordan MD   OXYGEN-AIR DELIVERY SYSTEMS 2 L/min by Nasal route continuous. Provider, Historical   furosemide (Lasix) 20 mg tablet Take 20 mg by mouth daily. Provider, Historical   dilTIAZem (CARDIZEM) 30 mg tablet Take 1 Tab by mouth Before breakfast, lunch, and dinner. Patient taking differently: Take 30 mg by mouth daily. Daily 4/14/20   Umer Childress MD   acetaminophen (TYLENOL) 325 mg tablet Take 650 mg by mouth every eight (8) hours as needed for Pain. Provider, Historical   dextran 70/hypromellose (ARTIFICIAL TEARS, PF, OP) Apply 2 Drops to eye as needed for Other (both eyes for dryness). Provider, Historical   tamsulosin (FLOMAX) 0.4 mg capsule Take 0.4 mg by mouth every evening. Other, MD Blayne       Allergies   Allergen Reactions    Aspirin Other (comments)     \"messes my stomach up\"    Bactrim [Sulfamethoxazole-Trimethoprim] Unknown (comments)         Review of Systems  GENERAL: Patient alert, awake and oriented times 3, able to communicate full sentences and not in distress. HEENT: No change in vision, no earache, tinnitus, sore throat or sinus congestion. NECK: No pain or stiffness. PULMONARY: + shortness of breath, cough or wheeze.   On home oxygen  Cardiovascular: no pnd or orthopnea, no CP  GASTROINTESTINAL: No abdominal pain, nausea, vomiting or diarrhea, melena or bright red blood per rectum. GENITOURINARY: No urinary frequency, urgency, hesitancy or dysuria. MUSCULOSKELETAL: No joint or muscle pain, no back pain, no recent trauma. DERMATOLOGIC: N worsening erythema and rash over the left leg with edema and swelling  ENDOCRINE: No polyuria, polydipsia, no heat or cold intolerance. No recent change in weight. HEMATOLOGICAL: No anemia or easy bruising or bleeding. NEUROLOGIC: No headache, seizures, numbness, tingling or weakness. Physical Exam:     Physical Exam:  Visit Vitals  /60   Pulse (!) 102   Temp (!) 100.7 °F (38.2 °C)   Resp 30   Wt 91.6 kg (201 lb 14.4 oz)   SpO2 97%   BMI 29.80 kg/m²      O2 Device: None (Room air)    Temp (24hrs), Av.7 °F (38.2 °C), Min:100.7 °F (38.2 °C), Max:100.7 °F (38.2 °C)    No intake/output data recorded. No intake/output data recorded. General:  Alert, cooperative, no distress, appears stated age. Head: Normocephalic, without obvious abnormality, atraumatic. Eyes:  Conjunctivae/corneas clear. PERRL, EOMs intact. Nose: Nares normal. No drainage or sinus tenderness. Neck: Supple, symmetrical, trachea midline, no adenopathy, thyroid: no enlargement, no carotid bruit and no JVD. Lungs:   Clear to auscultation bilaterally. Heart:  Regular rate and rhythm, S1, S2 normal.     Abdomen: Soft, non-tender. Bowel sounds normal.    Extremities: Extremities normal, atraumatic, no cyanosis or edema. Pulses: 2+ and symmetric all extremities. Skin:  Redness and swelling of his left ankle and lower leg with eschar formation of a dime size opening ulcer multiple scratches noted on that leg as well as edema left greater than right   Neurologic: AAOx3, No focal motor or sensory deficit. Labs Reviewed: All lab results for the last 24 hours reviewed.    and EKG    Procedures/imaging: see electronic medical records for all procedures/Xrays and details which were not copied into this note but were reviewed prior to creation of Plan      Assessment/Plan     Active Problems:  1. COPD exacerbation  We will continue on home nebulizers and Zosyn for now I did not add steroids as his wheezing was not that bad if his hypoxemia progressed can add steroids  2. Fever with sepsis  Code sepsis was called he is on blood cultures and lactate we will continue with Zosyn for now  3. Recent ankle open wound infection on chronic doxycycline with MRSA  Will have wound care see in consultation  As well as podiatry in consultation and and infectious disease  4. Lower leg cellulitis  We will start on IV antibiotics for now continue doxycycline IV as well as Zosyn IV check lower extremity duplex for DVT continue on Lovenox for DVT prevention since he is post surgery will have podiatry see him as well as infectious disease and get wound care consult  5. CHF  Compensated continue home medications  6.   Paroxysmal atrial fib  On DVT prophylaxis Lovenox reviewing x-rays rate control and compensated at this time       DVT/GI Prophylaxis: lovenox        Paula Tejeda MD  9/30/2021 10:34 PM

## 2021-10-01 NOTE — ED PROVIDER NOTES
EMERGENCY DEPARTMENT HISTORY AND PHYSICAL EXAM      Date: 9/30/2021  Patient Name: Bro Campos      History of Presenting Illness     Chief Complaint   Patient presents with    Fever    Fall       History Provided By: Patient and EMS    HPI: Bro Campso, 64 y.o. male with a past medical history significant Multiple medical problems including COPD and is O2 dependent 2 L nasal cannula, history of a brain aneurysm, prostate cancer, hypertension, patient is s/p left hip replacement May 2021, he fell and had left hip femur fracture July 2021, he then developed osteomyelitis left tibia, he required admission to rehab and apparently was discharged to home today, EMS called at scene after patient apparently fell. Details of fall unavailable. However at scene EMS report patient was noted to have temperature 103. 1. He was thus transported to the ED. patient is rather poor historian, he states he feels okay, he is denying any pain. He is unable to explain how he fell. He denies headache, chest pain, abdomen pain. There are no other complaints, changes, or physical findings at this time. PCP: Skip Villalobos MD    Current Outpatient Medications   Medication Sig Dispense Refill    collagenase (SANTYL) 250 unit/gram ointment Apply  to affected area daily. 30 g 1    arformoteroL (BROVANA) 15 mcg/2 mL nebu neb solution 2 mL by Nebulization route two (2) times a day. 30 Vial 0    budesonide (PULMICORT) 0.5 mg/2 mL nbsp 2 mL by Nebulization route two (2) times a day. 30 Each 0    enoxaparin (LOVENOX) 40 mg/0.4 mL 0.4 mL by SubCUTAneous route daily. 21 Syringe 0    famotidine (PEPCID) 20 mg tablet Take 1 Tab by mouth daily. 30 Tab 0    ferrous sulfate 325 mg (65 mg iron) tablet Take 1 Tab by mouth two (2) times daily (with meals). 30 Tab 0    amLODIPine (NORVASC) 5 mg tablet Take 1 Tab by mouth daily.  30 Tab 0    albuterol-ipratropium (DUO-NEB) 2.5 mg-0.5 mg/3 ml nebu 3 mL by Nebulization route every four (4) hours as needed for Wheezing. 30 Nebule 0    albuterol (PROVENTIL HFA, VENTOLIN HFA, PROAIR HFA) 90 mcg/actuation inhaler Take 2 Puffs by inhalation every four (4) hours as needed for Wheezing or Shortness of Breath. 1 Inhaler 1    OXYGEN-AIR DELIVERY SYSTEMS 2 L/min by Nasal route continuous.  furosemide (Lasix) 20 mg tablet Take 20 mg by mouth daily.  dilTIAZem (CARDIZEM) 30 mg tablet Take 1 Tab by mouth Before breakfast, lunch, and dinner. (Patient taking differently: Take 30 mg by mouth daily. Daily) 90 Tab 0    acetaminophen (TYLENOL) 325 mg tablet Take 650 mg by mouth every eight (8) hours as needed for Pain.  dextran 70/hypromellose (ARTIFICIAL TEARS, PF, OP) Apply 2 Drops to eye as needed for Other (both eyes for dryness).  tamsulosin (FLOMAX) 0.4 mg capsule Take 0.4 mg by mouth every evening. Past History     Past Medical History:  Past Medical History:   Diagnosis Date    Brain aneurysm     Brain aneurysm     Cancer ([address]     prostate    [facility]estive [facility] failure[facility]     CKD (chronic kidney disease)     Closed nondisplaced supracondylar fracture of lower end of left femur without intracondylar extension with delayed healing     COPD (chronic obstructive pulmonary disease) ([address]     Debility     History of home oxygen therapy     Kake (hard of hearing)     Hypertension     Nocturia     On home oxygen therapy     PAF [facility]paroxysmal atrial fibrillation) ([facility])     Pneumonia     Prostate CA ([address]     Prostate neoplasm     Radiation effect        Past Surgical History:  Past Surgical History:   Procedure Laterality Date    HX HERNIA REPAIR      HX HIP ARTHROSCOPY  04/09/2021       Family History:  Family History   Problem Relation Age of Onset    Heart Disease Mother        Social History:  Social History     Tobacco Use    Smoking status: Former Smoker     Types: Cigarettes    Smokeless tobacco: Never Used   Substance Use Topics    Alcohol use:  No  Drug use: Never       Allergies: Allergies   Allergen Reactions    Aspirin Other (comments)     \"messes my stomach up\"    Bactrim [Sulfamethoxazole-Trimethoprim] Unknown (comments)         Review of Systems     Review of Systems   Unable to perform ROS: Other (Poor historian.)       Physical Exam     Physical Exam  Vitals and nursing note reviewed. Constitutional:       General: He is not in acute distress. Appearance: He is well-developed. He is not diaphoretic. Comments: Chronically ill emaciated male who appears in minimal distress with audible wheezes. HENT:      Head: Normocephalic and atraumatic. No right periorbital erythema or left periorbital erythema. Jaw: No trismus. Right Ear: External ear normal. No drainage or swelling. Tympanic membrane is not perforated, erythematous or bulging. Left Ear: External ear normal. No drainage or swelling. Tympanic membrane is not perforated, erythematous or bulging. Nose: Nose normal. No mucosal edema or rhinorrhea. Right Sinus: No maxillary sinus tenderness or frontal sinus tenderness. Left Sinus: No maxillary sinus tenderness or frontal sinus tenderness. Mouth/Throat:      Mouth: No oral lesions. Dentition: No dental abscesses. Pharynx: Uvula midline. No oropharyngeal exudate, posterior oropharyngeal erythema or uvula swelling. Tonsils: No tonsillar abscesses. Eyes:      General: No scleral icterus. Right eye: No discharge. Left eye: No discharge. Conjunctiva/sclera: Conjunctivae normal.   Cardiovascular:      Rate and Rhythm: Regular rhythm. Tachycardia present. Heart sounds: Normal heart sounds. No murmur heard. No friction rub. No gallop. Pulmonary:      Effort: Pulmonary effort is normal. No tachypnea, accessory muscle usage or respiratory distress. Breath sounds: Wheezing present. No decreased breath sounds, rhonchi or rales.    Abdominal:      General: Bowel sounds are normal. There is no distension. Palpations: Abdomen is soft. Tenderness: There is no abdominal tenderness. Musculoskeletal:         General: No tenderness. Normal range of motion. Cervical back: Normal range of motion and neck supple. Comments: Left leg is 1+ edematous, his some abrasions of the left leg. There is wound dressing at the ankle, on removal of this days what appears to be a chronic ulceration medial ankle, there is no erythema, there is new tenderness. The left leg is warm to touch. Lymphadenopathy:      Cervical: No cervical adenopathy. Skin:     General: Skin is warm and dry. Neurological:      General: No focal deficit present. Mental Status: He is alert and oriented to person, place, and time. Cranial Nerves: No cranial nerve deficit. Sensory: No sensory deficit. Comments: Patient is oriented to name and place. He attempts to follow commands. He is able to raise each leg against gravity.   Hand  are symmetrical.   Psychiatric:         Judgment: Judgment normal.         Lab and Diagnostic Study Results     Labs -     Recent Results (from the past 12 hour(s))   EKG, 12 LEAD, INITIAL    Collection Time: 09/30/21  8:07 PM   Result Value Ref Range    Ventricular Rate 101 BPM    Atrial Rate 101 BPM    P-R Interval 150 ms    QRS Duration 74 ms    Q-T Interval 340 ms    QTC Calculation (Bezet) 440 ms    Calculated P Axis 68 degrees    Calculated R Axis 40 degrees    Calculated T Axis 68 degrees    Diagnosis       Sinus tachycardia  Otherwise normal ECG  When compared with ECG of 23-APR-2021 04:14,  Sinus rhythm has replaced Atrial fibrillation  ST no longer depressed in Anterior leads     CBC WITH AUTOMATED DIFF    Collection Time: 09/30/21  8:42 PM   Result Value Ref Range    WBC 12.0 4.6 - 13.2 K/uL    RBC 3.34 (L) 4.35 - 5.65 M/uL    HGB 8.9 (L) 13.0 - 16.0 g/dL    HCT 28.2 (L) 36.0 - 48.0 %    MCV 84.4 78.0 - 100.0 FL    MCH 26.6 24.0 - 34.0 PG    MCHC 31.6 31.0 - 37.0 g/dL    RDW 14.7 (H) 11.6 - 14.5 %    PLATELET 994 028 - 131 K/uL    MPV 10.3 9.2 - 11.8 FL    NEUTROPHILS 83 (H) 40 - 73 %    LYMPHOCYTES 6 (L) 21 - 52 %    MONOCYTES 6 3 - 10 %    EOSINOPHILS 3 0 - 5 %    BASOPHILS 0 0 - 2 %    ABS. NEUTROPHILS 9.9 (H) 1.8 - 8.0 K/UL    ABS. LYMPHOCYTES 0.7 (L) 0.9 - 3.6 K/UL    ABS. MONOCYTES 0.8 0.05 - 1.2 K/UL    ABS. EOSINOPHILS 0.4 0.0 - 0.4 K/UL    ABS. BASOPHILS 0.0 0.0 - 0.1 K/UL    DF AUTOMATED     CARDIAC PANEL,(CK, CKMB & TROPONIN)    Collection Time: 09/30/21  8:42 PM   Result Value Ref Range    CK - MB 1.5 <3.6 ng/ml    CK-MB Index 6.0 (H) 0.0 - 4.0 %    CK 25 (L) 39 - 308 U/L    Troponin-I, QT <0.02 0.0 - 8.798 NG/ML   METABOLIC PANEL, COMPREHENSIVE    Collection Time: 09/30/21  8:42 PM   Result Value Ref Range    Sodium 138 136 - 145 mmol/L    Potassium 4.2 3.5 - 5.5 mmol/L    Chloride 104 100 - 111 mmol/L    CO2 28 21 - 32 mmol/L    Anion gap 6 3.0 - 18 mmol/L    Glucose 102 (H) 74 - 99 mg/dL    BUN 27 (H) 7.0 - 18 MG/DL    Creatinine 1.64 (H) 0.6 - 1.3 MG/DL    BUN/Creatinine ratio 16 12 - 20      GFR est AA 49 (L) >60 ml/min/1.73m2    GFR est non-AA 41 (L) >60 ml/min/1.73m2    Calcium 8.3 (L) 8.5 - 10.1 MG/DL    Bilirubin, total 0.3 0.2 - 1.0 MG/DL    ALT (SGPT) 13 (L) 16 - 61 U/L    AST (SGOT) 15 10 - 38 U/L    Alk.  phosphatase 82 45 - 117 U/L    Protein, total 5.6 (L) 6.4 - 8.2 g/dL    Albumin 2.0 (L) 3.4 - 5.0 g/dL    Globulin 3.6 2.0 - 4.0 g/dL    A-G Ratio 0.6 (L) 0.8 - 1.7     NT-PRO BNP    Collection Time: 09/30/21  8:42 PM   Result Value Ref Range    NT pro- 0 - 1,800 PG/ML   LACTIC ACID    Collection Time: 09/30/21  8:52 PM   Result Value Ref Range    Lactic acid 1.6 0.4 - 2.0 MMOL/L   INFLUENZA A & B AG (RAPID TEST)    Collection Time: 09/30/21  8:52 PM   Result Value Ref Range    Influenza A Antigen Negative NEG      Influenza B Antigen Negative NEG     COVID-19 RAPID TEST    Collection Time: 09/30/21  8:52 PM Result Value Ref Range    Specimen source Nasopharyngeal      COVID-19 rapid test Not detected NOTD     URINALYSIS W/ RFLX MICROSCOPIC    Collection Time: 09/30/21  9:30 PM   Result Value Ref Range    Color YELLOW      Appearance CLEAR      Specific gravity 1.016 1.005 - 1.030      pH (UA) 5.0 5.0 - 8.0      Protein 30 (A) NEG mg/dL    Glucose Negative NEG mg/dL    Ketone Negative NEG mg/dL    Bilirubin Negative NEG      Blood Negative NEG      Urobilinogen 1.0 0.2 - 1.0 EU/dL    Nitrites Negative NEG      Leukocyte Esterase Negative NEG     URINE MICROSCOPIC ONLY    Collection Time: 09/30/21  9:30 PM   Result Value Ref Range    WBC 0 to 1 0 - 5 /hpf    RBC 0 to 3 0 - 5 /hpf    Epithelial cells Negative 0 - 5 /lpf    Bacteria 1+ (A) NEG /hpf       Radiologic Studies -   [unfilled]  CT Results  (Last 48 hours)    None        CXR Results  (Last 48 hours)               09/30/21 2121  XR CHEST PORT Final result    Impression:  Chronic lung changes and pleural calcification. No infiltrates or   effusion. Narrative:  EXAM:  AP Portable Chest X-ray 1 view        INDICATION: Fever       COMPARISON: July 12, 2021       _______________       FINDINGS:  Heart and mediastinal contours are within normal limits for portable   radiograph. Moderate to severe emphysema present. Chronic lung changes and   pleural calcifications are seen. Again seen is a teardrop shaped peripherally   rim calcified density in the right midlung unchanged. No focal airspace disease. There are no pleural effusions. No acute osseous findings. ________________                     Medical Decision Making and ED Course   - I am the first and primary provider for this patient AND AM THE PRIMARY PROVIDER OF RECORD. - I reviewed the vital signs, available nursing notes, past medical history, past surgical history, family history and social history. - Initial assessment performed.  The patients presenting problems have been discussed, and the staff are in agreement with the care plan formulated and outlined with them. I have encouraged them to ask questions as they arise throughout their visit. Vital Signs-Reviewed the patient's vital signs. Patient Vitals for the past 12 hrs:   Temp Pulse Resp BP SpO2   09/30/21 2330 98.4 °F (36.9 °C) -- -- -- --   09/30/21 2244 -- -- -- -- 100 %   09/30/21 2230 -- (!) 102 22 (!) 97/50 96 %   09/30/21 2009 (!) 100.7 °F (38.2 °C) (!) 102 30 120/60 97 %       EKG interpretation: (Preliminary): Performed at 20:07, and read at 20:07  Rhythm: normal sinus rhythm; and regular . Rate (approx.): 101; Axis: normal; [place] interval: normal; QRS interval: normal ; ST/T wave: normal; Other findings: .      Records Reviewed: Nursing Notes, Old Medical Records, Previous Radiology Studies and Previous Laboratory Studies    The patient presents with fever and for with a differential diagnosis of syncope, seizure, delirium, viral illness, sepsis, UTI, pneumonia, cellulitis, electrolyte abnormalities, medication reaction    ED Course:     Presents with questionable fall and fever. He has no pain. He is slightly tachycardic, and febrile. He has history of osteomyelitis and also history of COPD. Apparently this appears he he was recently on some antibiotics for the osteomyelitis. Also has recent history of Covid. Lactic acid was 1.6 in ED. Patient wheezing given a neb treatment and chest x-ray shows questionable haziness right lower lobe, this may be chronic with his COPD. He has no leukocytosis. Urine is clear. Patient given 3.3 g of Zosyn in the ED. He is being admitted for observation pending cultures.          Provider Notes (Medical Decision Making):               Consultations:       Consultations:         Procedures and Critical Care                         Disposition     Disposition: Admitted to telemetry the case was discussed with the admitting physician Yoan Pina              Diagnosis     Clinical Impression:   1. Febrile illness    2. COPD exacerbation ([facility] [address] [address]        Attestations:    Kay Gabriel MD    Please note that this dictation was completed with [employer], the computer voice recognition software. Quite often unanticipated grammatical, syntax, homophones, and other interpretive errors are inadvertently transcribed by the computer software. Please disregard these errors. Please excuse any errors that have escaped final proofreading. Thank you.

## 2021-10-01 NOTE — CONSULTS
Podiatry Consult    Subjective:         Date of Consultation: October 1, 2021     Referring Physician: Dr. Yady Shoemaker  Patient is a 64 y.o.  male who is being seen for chronic open wound left posterior ankle at the Achilles' tendon. He was admitted here about 6 weeks ago with the same problem, and I took him to the OR for debridement and Grafting, that is still intact and looks good.     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 ([address] 08/23/2021    Open wound of left ankle 08/20/2021    Infected wound 08/20/2021    Supplemental oxygen dependent 08/20/2021    MRSA (methicillin resistant staph aureus) culture positive 08/20/2021    Pyogenic inflammation of bone ([address] 08/20/2021    Osteomyelitis of left ankle ([address] 08/20/2021    Intercondylar fracture of femur ([address] 07/12/2021    Left leg pain 07/12/2021    Open wound of left hip 05/26/2021    Scrotal edema 04/23/2021    Hematoma of left hip 04/19/2021    Acute hip pain 04/18/2021    Status post total replacement of left hip 04/14/2021    Anemia 04/11/2021    Obesity 04/10/2021    Acute pulmonary edema (HCC) 04/10/2021    Avascular necrosis of bone of left hip ([address] 04/08/2021    Left hip pain 04/05/2021    PAF (paroxysmal atrial fibrillation) ([address] 04/05/2021    Tobacco dependence 02/21/2020    Chronic respiratory failure with hypoxia ([address] 08/19/2019    Age-related physical debility 07/08/2019    Chronic renal disease, stage 3, moderately decreased glomerular filtration rate between 30-59 mL/min/1.73 square meter ([address] 07/20/2018    Chronic obstructive pulmonary disease ([address] 04/20/2018    Hypertension      Past Medical History:   Diagnosis Date    Brain aneurysm     Brain aneurysm     Cancer (HCC)     prostate    CHF (congestive heart failure) (HCC)     CKD (chronic kidney disease)     Closed nondisplaced supracondylar fracture of lower end of left femur without intracondylar extension with delayed healing     [facility]     Debility     History of home oxygen therapy     [place] (hard of hearing)     Hypertension     Nocturia     On home oxygen therapy     PAF (paroxysmal atrial fibrillation) ([facility])     Pneumonia     Prostate CA ([facility]     Prostate neoplasm     Radiation effect       Past Surgical History:   Procedure Laterality Date    HX HERNIA REPAIR      HX HIP ARTHROSCOPY  04/09/2021      Family History   Problem Relation Age of Onset    Heart Disease Mother       Social History     Tobacco Use    Smoking status: Former Smoker     Types: Cigarettes    Smokeless tobacco: Never Used   Substance Use Topics    Alcohol use: No     Current Facility-Administered Medications   Medication Dose Route Frequency Provider Last Rate Last Admin    piperacillin-tazobactam (ZOSYN) 3.375 g in 0.9% sodium chloride (MBP/ADV) 100 mL MBP  3.375 g IntraVENous Q6H Sayra Jordan  mL/hr at 10/01/21 0827 3.375 g at 10/01/21 0827    doxycycline (VIBRAMYCIN) 100 mg in 0.9% sodium chloride (MBP/ADV) 100 mL MBP  100 mg IntraVENous Q12H Sayra Jordan  mL/hr at 10/01/21 0100 100 mg at 10/01/21 0100    acetaminophen (TYLENOL) tablet 650 mg  650 mg Oral Q8H PRN Sayra Jordan MD        albuterol (PROVENTIL HFA, VENTOLIN HFA, PROAIR HFA) inhaler 2 Puff  2 Puff Inhalation Q4H PRN Sayra Jordan MD        albuterol-ipratropium (DUO-NEB) 2.5 MG-0.5 MG/3 ML  3 mL Nebulization Q4H PRN Sayra Jordan MD        collagenase (SANTYL) 250 unit/gram ointment   Topical DAILY Sayra Jordan MD   Given at 10/01/21 0830    dilTIAZem IR (CARDIZEM) tablet 30 mg  30 mg Oral TIDAC Sayra Jordan MD   30 mg at 10/01/21 0829    famotidine (PEPCID) tablet 20 mg 20 mg Oral DAILY Ector Jordan MD   20 mg at 10/01/21 2334    ferrous sulfate tablet 325 mg  325 mg Oral BID WITH MEALS Ector Jordan MD   325 mg at 10/01/21 1036    furosemide (LASIX) tablet 20 mg  20 mg Oral DAILY Ector Jordan MD   20 mg at 10/01/21 0829    tamsulosin (FLOMAX) capsule 0.4 mg  0.4 mg Oral QPM Ector Jordan MD        sodium chloride (NS) flush 5-40 mL  5-40 mL IntraVENous Q8H Ector Jordan MD   10 mL at 10/01/21 0600    sodium chloride (NS) flush 5-40 mL  5-40 mL IntraVENous PRN Ector Jordan MD        acetaminophen (TYLENOL) tablet 650 mg  650 mg Oral Q6H PRN Ector Jordan MD        Or   [facility] acetaminophen (TYLENOL) suppository 650 mg  650 mg Rectal Q6H PRN Ector Jordan MD        polyethylene glycol (MIRALAX) packet 17 g  17 g Oral DAILY PRN Ector Jordan MD        ondansetron (ZOFRAN ODT) tablet 4 mg  4 mg Oral Q8H PRN Ector Jordan MD        Or    ondansetron (ZOFRAN) injection 4 mg  4 mg IntraVENous Q6H PRN Ector Jordan MD        enoxaparin (LOVENOX) injection 40 mg  40 mg SubCUTAneous DAILY Ector Jordan MD   40 mg at 10/01/21 0829    arformoteroL (BROVANA) neb solution 15 mcg  15 mcg Nebulization BID RT Ector Jordan MD   15 mcg at 10/01/21 0717    budesonide (PULMICORT) 500 mcg/2 ml nebulizer suspension  500 mcg Nebulization BID RT Ector Jordan MD   500 mcg at 10/01/21 4284      Allergies   Allergen Reactions    Aspirin Other (comments)     \"messes my stomach up\"    Bactrim [Sulfamethoxazole-Trimethoprim] Unknown (comments)        Review of Systems:  A comprehensive review of systems was negative except for that written in the History of Present Illness.     Objective:     Patient Vitals for the past 8 hrs:   BP Temp Pulse Resp SpO2   10/01/21 0823 (!) 123/53 97.3 °F (36.3 °C) 65 22 99 %   10/01/21 0718 -- -- -- -- 97 %   10/01/21 0409 (!) 113/53 97.4 °F (36.3 °C) 70 24 100 %     Temp (24hrs), Av.4 °F (36.9 °C), Min:97.3 °F (36.3 °C), Max:100.7 °F (38.2 °C)      Physical Exam:      Vascular:  Dorsalis pedis pulses are 2/4 bilateral.  Posterior tibial pulses are 2/4 bilateral.  Capillary fill time is less than 3 seconds, bilateral.  Edema is observed bilateral lower extremities. Varicosities are not observed bilateral lower extremities. Derm:  Skin temperature of lower extremities warm to cool proximal to distal bilateral.  Inspection of skin shows open wound left posterior ankle area at the Achilles' tendon area 2.0 x 2.5 x .01 cm with intact Stravix graft covering the Achilles' tendon. Neg erythema, Neg signs of infection. Some serous drainage. Ortho:  Muscle strength 5/5 for all groups tested. Muscle tone normal. Inspection/palpation of bones - joints- muscle shows - within normal limits bilateral lower extremities. Neuro:  Light touch, sharp/dull, vibratory, and proprioception sensations all intact decreased bilateral lower extremities. Deep tendon reflexes intact decreased bilaterally.     Lab Review:   Recent Results (from the past 24 hour(s))   EKG, 12 LEAD, INITIAL    Collection Time: 21  8:07 PM   Result Value Ref Range    Ventricular Rate 101 BPM    Atrial Rate 101 BPM    P-R Interval 150 ms    QRS Duration 74 ms    Q-T Interval 340 ms    QTC Calculation (Bezet) 440 ms    Calculated P Axis 68 degrees    Calculated R Axis 40 degrees    Calculated T Axis 68 degrees    Diagnosis       Sinus tachycardia  Otherwise normal ECG  When compared with ECG of 2021 04:14,  Sinus rhythm has replaced Atrial fibrillation  ST no longer depressed in Anterior leads     CULTURE, BLOOD    Collection Time: 21  8:32 PM    Specimen: Blood   Result Value Ref Range    Special Requests: NO SPECIAL REQUESTS      Culture result: NO GROWTH AFTER 11 HOURS     CBC WITH AUTOMATED DIFF    Collection Time: 21  8:42 PM   Result Value Ref Range    WBC 12.0 4.6 - 13.2 K/uL    RBC 3.34 (L) 4.35 - 5.65 M/uL    HGB 8.9 (L) 13.0 - 16.0 g/dL    HCT 28.2 (L) 36.0 - 48.0 %    MCV 84.4 78.0 - 100.0 FL    MCH 26.6 24.0 - 34.0 PG    MCHC 31.6 31.0 - 37.0 g/dL    RDW 14.7 (H) 11.6 - 14.5 %    PLATELET 465 922 - 794 K/uL    MPV 10.3 9.2 - 11.8 FL    NEUTROPHILS 83 (H) 40 - 73 %    LYMPHOCYTES 6 (L) 21 - 52 %    MONOCYTES 6 3 - 10 %    EOSINOPHILS 3 0 - 5 %    BASOPHILS 0 0 - 2 %    ABS. NEUTROPHILS 9.9 (H) 1.8 - 8.0 K/UL    ABS. LYMPHOCYTES 0.7 (L) 0.9 - 3.6 K/UL    ABS. MONOCYTES 0.8 0.05 - 1.2 K/UL    ABS. EOSINOPHILS 0.4 0.0 - 0.4 K/UL    ABS. BASOPHILS 0.0 0.0 - 0.1 K/UL    DF AUTOMATED     CARDIAC PANEL,(CK, CKMB & TROPONIN)    Collection Time: 09/30/21  8:42 PM   Result Value Ref Range    CK - MB 1.5 <3.6 ng/ml    CK-MB Index 6.0 (H) 0.0 - 4.0 %    CK 25 (L) 39 - 308 U/L    Troponin-I, QT <0.02 0.0 - 1.920 NG/ML   METABOLIC PANEL, COMPREHENSIVE    Collection Time: 09/30/21  8:42 PM   Result Value Ref Range    Sodium 138 136 - 145 mmol/L    Potassium 4.2 3.5 - 5.5 mmol/L    Chloride 104 100 - 111 mmol/L    CO2 28 21 - 32 mmol/L    Anion gap 6 3.0 - 18 mmol/L    Glucose 102 (H) 74 - 99 mg/dL    BUN 27 (H) 7.0 - 18 MG/DL    Creatinine 1.64 (H) 0.6 - 1.3 MG/DL    BUN/Creatinine ratio 16 12 - 20      GFR est AA 49 (L) >60 ml/min/1.73m2    GFR est non-AA 41 (L) >60 ml/min/1.73m2    Calcium 8.3 (L) 8.5 - 10.1 MG/DL    Bilirubin, total 0.3 0.2 - 1.0 MG/DL    ALT (SGPT) 13 (L) 16 - 61 U/L    AST (SGOT) 15 10 - 38 U/L    Alk.  phosphatase 82 45 - 117 U/L    Protein, total 5.6 (L) 6.4 - 8.2 g/dL    Albumin 2.0 (L) 3.4 - 5.0 g/dL    Globulin 3.6 2.0 - 4.0 g/dL    A-G Ratio 0.6 (L) 0.8 - 1.7     NT-PRO BNP    Collection Time: 09/30/21  8:42 PM   Result Value Ref Range    NT pro- 0 - 1,800 PG/ML   CULTURE, BLOOD    Collection Time: 09/30/21  8:52 PM    Specimen: Blood   Result Value Ref Range    Special Requests: NO SPECIAL REQUESTS      Culture result: NO GROWTH AFTER 11 HOURS LACTIC ACID    Collection Time: 09/30/21  8:52 PM   Result Value Ref Range    Lactic acid 1.6 0.4 - 2.0 MMOL/L   INFLUENZA A & B AG (RAPID TEST)    Collection Time: 09/30/21  8:52 PM   Result Value Ref Range    Influenza A Antigen Negative NEG      Influenza B Antigen Negative NEG     COVID-19 RAPID TEST    Collection Time: 09/30/21  8:52 PM   Result Value Ref Range    Specimen source Nasopharyngeal      COVID-19 rapid test Not detected NOTD     URINALYSIS W/ RFLX MICROSCOPIC    Collection Time: 09/30/21  9:30 PM   Result Value Ref Range    Color YELLOW      Appearance CLEAR      Specific gravity 1.016 1.005 - 1.030      pH (UA) 5.0 5.0 - 8.0      Protein 30 (A) NEG mg/dL    Glucose Negative NEG mg/dL    Ketone Negative NEG mg/dL    Bilirubin Negative NEG      Blood Negative NEG      Urobilinogen 1.0 0.2 - 1.0 EU/dL    Nitrites Negative NEG      Leukocyte Esterase Negative NEG     URINE MICROSCOPIC ONLY    Collection Time: 09/30/21  9:30 PM   Result Value Ref Range    WBC 0 to 1 0 - 5 /hpf    RBC 0 to 3 0 - 5 /hpf    Epithelial cells Negative 0 - 5 /lpf    Bacteria 1+ (A) NEG /hpf       Impression:   Chronic open wound left Achilles' Tendon with Stravix graft intact. Rule out infection from drainage. Recommendation:   Wound cultures taken today left Achilles' tendon wound  Recommend continue IV antibiotics as per medicine. Patient showed MRSA on last admission 6 weeks ago  Cleaned wound today and applied dressing left Achilles' wound. Consult Wound Care for continued wound care and I will follow him as needed.

## 2021-10-01 NOTE — PROGRESS NOTES
Occupational Therapy Evaluation Attempt     OT eval orders received. Chart reviewed. Attempted Occupational Therapy Evaluation, however, patient unable to be seen due to:  []  Nausea/vomiting  []  Eating  []  Pain  []  Patient too lethargic  []  Off Unit for testing/procedure  []  Dialysis treatment in progress  []  Telemetry Results  [x]  Other:  Duplex study reveal: Acute non-occlusive thrombus present in the right proximal femoral vein. CAITLIN Salazar requests to hold therapy for today. Will f/u tomorrow as patient's schedule allows.   Rosa Torres, OTR/L

## 2021-10-01 NOTE — CONSULTS
[facility]  (A Division of [address] [place] [address])                                                                                                                       Lillian Lambert MD  Office #:     [phone]  [phone]   Office Fax: [phone]     Date of Admission: 9/30/2021Date of Note: 10/1/2021      Reason for Consult: Evaluation and antibiotic management of fever    Thank you for involving me in the care of this patient. Please do not hesitate to contact me on the above number if question or concern. Dr Luh Vargas covering the weekend. I will resume rounding on Monday-Oct 4, 2021. Please call via  or [employer] for questions/consults. Thanks. Current Antimicrobials:    Prior Antimicrobials:  Zosyn 9/30 to date  Doxycycline IV 9/30 to date   NA       Assessment- ID related:  --------------------------------------------------------------------------  · Possible sepsis. Fever with mild leucocytosis--Not sure what the source is. Possible UTI?has hematuria. Doubt cellulitis- graft site- L ankle looks ok, skin changes noted. Doubt OM with normal x-ray- no mention of OM with chronic wound in place. CXR with chronic changes as well. · URIEL- Cr bump to 1.64-- due to sepsis Vs other causes   Covid rapid test negative  BCX 9/30- [facility]  History of MRSA pna- 4/2021  Wound infection 8/2021-- treated with doxy/cipro  X10 days from 8/23/21    Other Medical Issues- Mx per respective team:  L fem fracture-hx  COPD O2 dependent  Hx of brain aneurism  L femur # 07/2021-> OM left tibia   Recommendation for ID issues I am following:  -----------------------------------------------------------------------------    Cont with emperic Zosyn for possible sepsis  If hypotension- Add vanco.  -FU blood culture  - check UA with micro/ culture- STAT  -check CT AP- ordered- to [facility] source. Bone scan order noted-- may light up on Left ankle, as he has a graft in place. HPI:  John Kelley is a 64 y.o. WHITE/NON- with PMH as listed below, known to me from admission in August 2021. He is very hard of hearing, and difficult to piece together the history from him alone. Reviewed chart in detail and DW with Podiatyr/nursing. Seems like he was DC'ed from SNF to home recently, and he had a fall when EMS called. Temp was high to 103.1 by EMS ED chart stated. He doesn't recall high fevers/chills/cough/SOB/abd pain- but does have discomfort when passing urine. Doesn't recall for how  Long. Denies increasing pain on left hip-prior fracture site. In ED, temp 100.7, WBC 12K, 100% on 2L, BP in ED 97/50. CPK is ok. Cr is bumped up to 1.6. CXR with no acute changes. X-ray of foot and ankle L- no mention of OM/fracture. He had graft by Podiatry and healing taking place nicely. Found to have acute dVT LE. Currently on IV doxycycline and Zosyn. Not clear when he tool oral doxy last.    He was in Ed for Regeneron infusion on 9/10/21.          Active Hospital Problems    Diagnosis Date Noted    COPD with acute exacerbation ([address] 09/30/2021    Febrile illness 09/30/2021     Past Medical History:   Diagnosis Date    Brain aneurysm     Brain aneurysm     Cancer (HCC)     prostate    CHF (congestive heart failure) (HCC)     CKD (chronic kidney disease)     Closed nondisplaced supracondylar fracture of lower end of left femur without intracondylar extension with delayed healing     COPD (chronic obstructive pulmonary disease) ([address]     Debility     History of home oxygen therapy     [place] (hard of hearing)     Hypertension     Nocturia     On home oxygen therapy     PAF (paroxysmal atrial fibrillation) (HCC)     Pneumonia     Prostate CA ([address]     Prostate neoplasm     Radiation effect      Past Surgical History:   Procedure Laterality Date    HX HERNIA REPAIR      HX HIP ARTHROSCOPY  04/09/2021     Family History   Problem Relation Age of Onset    Heart Disease Mother      Social History     Socioeconomic History    Marital status:      Spouse name: Not on file    Number of children: Not on file    Years of education: Not on file    Highest education level: Not on file   Occupational History    Not on file   Tobacco Use    Smoking status: Former Smoker     Types: Cigarettes    Smokeless tobacco: Never Used   Substance and Sexual Activity    Alcohol use: No    Drug use: Never    Sexual activity: Not on file   Other Topics Concern    Not on file   Social History Narrative    Not on file     Social Determinants of Health     Financial Resource Strain:     Difficulty of Paying Living Expenses:    Food Insecurity:     Worried About [address] in the Last Year:     [address] [employer] in the Last Year:    Transportation Needs:     Lack of Transportation (Medical):  Lack of Transportation (Non-Medical):    Physical Activity:     Days of Exercise per Week:     Minutes of Exercise per Session:    Stress:     Feeling of Stress :    Social Connections:     Frequency of Communication with Friends and Family:     Frequency of Social Gatherings with Friends and Family:     Attends Rastafari Services:     Active Member of Clubs or Organizations:     Attends Club or Organization Meetings:     Marital Status:    Intimate Partner Violence:     Fear of Current or Ex-Partner:     Emotionally Abused:     Physically Abused:     Sexually Abused:         Allergies:  Aspirin and Bactrim [sulfamethoxazole-trimethoprim]     Medications:  Current Facility-Administered Medications   Medication Dose Route Frequency    piperacillin-tazobactam (ZOSYN) 3.375 g in 0.9% sodium chloride (MBP/ADV) 100 mL MBP  3.375 g IntraVENous Q6H    doxycycline (VIBRAMYCIN) 100 mg in 0.9% sodium chloride (MBP/ADV) 100 mL MBP  100 mg IntraVENous Q12H    acetaminophen (TYLENOL) tablet 650 mg  650 mg Oral Q8H PRN    albuterol (PROVENTIL HFA, VENTOLIN HFA, PROAIR HFA) inhaler 2 Puff  2 Puff Inhalation Q4H PRN    albuterol-ipratropium (DUO-NEB) 2.5 MG-0.5 MG/3 ML  3 mL Nebulization Q4H PRN    collagenase (SANTYL) 250 unit/gram ointment   Topical DAILY    dilTIAZem IR (CARDIZEM) tablet 30 mg  30 mg Oral TIDAC    famotidine (PEPCID) tablet 20 mg  20 mg Oral DAILY    ferrous sulfate tablet 325 mg  325 mg Oral BID WITH MEALS    furosemide (LASIX) tablet 20 mg  20 mg Oral DAILY    tamsulosin (FLOMAX) capsule 0.4 mg  0.4 mg Oral QPM    sodium chloride (NS) flush 5-40 mL  5-40 mL IntraVENous Q8H    sodium chloride (NS) flush 5-40 mL  5-40 mL IntraVENous PRN    acetaminophen (TYLENOL) tablet 650 mg  650 mg Oral Q6H PRN    Or    acetaminophen (TYLENOL) suppository 650 mg  650 mg Rectal Q6H PRN    polyethylene glycol (MIRALAX) packet 17 g  17 g Oral DAILY PRN    ondansetron (ZOFRAN ODT) tablet 4 mg  4 mg Oral Q8H PRN    Or    ondansetron (ZOFRAN) injection 4 mg  4 mg IntraVENous Q6H PRN    enoxaparin (LOVENOX) injection 40 mg  40 mg SubCUTAneous DAILY    arformoteroL (BROVANA) neb solution 15 mcg  15 mcg Nebulization BID RT    budesonide (PULMICORT) 500 mcg/2 ml nebulizer suspension  500 mcg Nebulization BID RT        ROS:  Pertinent items are noted in the History of Present Illness. Physical Exam:    Temp (24hrs), Av.4 °F (36.9 °C), Min:97.3 °F (36.3 °C), Max:100.7 °F (38.2 °C)    Visit Vitals  BP (!) 123/53   Pulse 65   Temp 97.3 °F (36.3 °C)   Resp 22   Wt 91.6 kg (201 lb 14.4 oz)   SpO2 99%   BMI 29.80 kg/m²          GEN: WD frail, chronically ill looking,  not in resp distress. HEENT: Unicteric. EOMI intact  CHEST: Non laboured breathing. CTA  CVS:RRR, no mur/gallop  ABD: Obese/soft. Non tender. AMANDA: Condom catheter removed- dark urine in bag  EXT: No apparent swelling or redness on UE/LE joints. Left LE- dressed, pictures reviweed with podiatry  Skin: Dry and intact. No rash, no redness. CNS: A, OX3. Moves all extremity. CN grossly ok.  Except hard of hearing Microbiology  All Micro Results     Procedure Component Value Units Date/Time    CULTURE, URINE [[id number]]     Order Status: Sent Specimen: Cath Urine     CULTURE, [place] STAIN [[id number]] Collected: 10/01/21 1115    Order Status: Completed Specimen: Wound Drainage Updated: 10/01/21 1249    CULTURE, BLOOD [[id number]] Collected: 09/30/21 2052    Order Status: Completed Specimen: Blood Updated: 10/01/21 0844     Special Requests: NO SPECIAL REQUESTS        Culture result: NO GROWTH AFTER 11 HOURS       CULTURE, BLOOD [[id number]] Collected: 09/30/21 2032    Order Status: Completed Specimen: Blood Updated: 10/01/21 0844     Special Requests: NO SPECIAL REQUESTS        Culture result: NO GROWTH AFTER 11 HOURS       INFLUENZA A & B AG (RAPID TEST) [[id number]] Collected: 09/30/21 2052    Order Status: Completed Specimen: Nasopharyngeal from Nasal washing Updated: 09/30/21 2124     Influenza A Antigen Negative        Comment: A negative result does not exclude influenza virus infection, seasonal or H1N1 due to suboptimal sensitivity. If influenza is circulating in your community, a diagnosis of influenza should be considered based on a patients clinical presentation and empiric antiviral treatment should be considered, if indicated. Influenza B Antigen Negative       COVID-19 RAPID TEST [[id number]] Collected: 09/30/21 2052    Order Status: Completed Specimen: Nasopharyngeal Updated: 09/30/21 2122     Specimen source Nasopharyngeal        COVID-19 rapid test Not detected        Comment: Rapid Abbott ID Now       Rapid NAAT:  The specimen is NEGATIVE for SARS-CoV-2, the novel coronavirus associated with COVID-19. Negative results should be treated as presumptive and, if inconsistent with clinical signs and symptoms or necessary for patient management, should be tested with an alternative molecular assay.   Negative results do not preclude SARS-CoV-2 infection and should not be used as the sole basis for patient management decisions. This test has been authorized by the FDA under an Emergency Use Authorization (EUA) for use by authorized laboratories.    Fact sheet for Healthcare Providers: ConventionUpdate.co.nz  Fact sheet for Patients: ConventionUpdate.co.nz       Methodology: Isothermal Nucleic Acid Amplification                    Lines / Catheters:  Lab results:    Chemistry  Recent Labs     09/30/21 2042   *      K 4.2      CO2 28   BUN 27*   CREA 1.64*   CA 8.3*   AGAP 6   BUCR 16   AP 82   TP 5.6*   ALB 2.0*   GLOB 3.6   AGRAT 0.6*       CBC w/ Diff  Recent Labs     09/30/21 2042   WBC 12.0   RBC 3.34*   HGB 8.9*   HCT 28.2*      GRANS 83*   LYMPH 6*   EOS 3       Imaging: report posted below as per radiologist

## 2021-10-01 NOTE — PROGRESS NOTES
Problem: Falls - Risk of  Goal: *Absence of Falls  Description: Document [place] Fall Risk and appropriate interventions in the flowsheet. Outcome: Progressing Towards Goal  Note: Fall Risk Interventions:            Medication Interventions: Patient to call before getting OOB, Teach patient to arise slowly    Elimination Interventions: Call light in reach    History of Falls Interventions: Investigate reason for fall, Room close to nurse's station         Problem: Pressure Injury - Risk of  Goal: *Prevention of pressure injury  Description: Document Nikos Scale and appropriate interventions in the flowsheet.   Outcome: Progressing Towards Goal  Note: Pressure Injury Interventions:  Sensory Interventions: Assess changes in LOC         Activity Interventions: Increase time out of bed, Pressure redistribution bed/mattress(bed type)    Mobility Interventions: HOB 30 degrees or less, Pressure redistribution bed/mattress (bed type)    Nutrition Interventions: Document food/fluid/supplement intake, Offer support with meals,snacks and hydration    Friction and Shear Interventions: HOB 30 degrees or less

## 2021-10-01 NOTE — PROGRESS NOTES
0720 Bedside and Verbal shift change report given to GREGORIO Bowen RN (oncoming nurse) by EDWIN Parish RN (offgoing nurse). Report included the following information SBAR, Kardex, Intake/Output, and MAR. Pt in bed, call light in reach. 8369 Pt assessed. No signs of acute distress. Pt in bed. 1058 Paged MD Adele Cartwright. Pt has dark red urine with many clots. [address] to MD Adele Cartwright, stated to continue to monitor urine. 80 Paged MD Adele Cartwright, pt found to have DVT in R femoral acute nonocclusive. Randy kirk [address] 112 to MD Adele Cartwright. Pt having multiple BMs, Adele Cartwright gave telephone order for florastor bid. 1730 Pt is refusing CT. States he does not want to do it. Offered sedation medication, pt continues to refuse. LIZZETTE Wayne notified. 1915 Bedside and Verbal shift change report given to Juanita Cha RN (oncoming nurse) by Mansi Putnam. Ashlyn Bowen RN (offgoing nurse). Report included the following information SBAR, Kardex, Intake/Output, and MAR. Pt in bed, call light in reach. Pt has had 4 medium loose brown BMs today.

## 2021-10-02 LAB
ALBUMIN SERPL-MCNC: 1.7 G/DL (ref 3.4–5)
ALBUMIN/GLOB SERPL: 0.5 {RATIO} (ref 0.8–1.7)
ALP SERPL-CCNC: 59 U/L (ref 45–117)
ALT SERPL-CCNC: 10 U/L (ref 16–61)
APPEARANCE UR: ABNORMAL
APTT PPP: 92.6 SEC (ref 23–36.4)
APTT PPP: 96 SEC (ref 23–36.4)
AST SERPL-CCNC: 9 U/L (ref 10–38)
BACTERIA URNS QL MICRO: ABNORMAL /HPF
BASOPHILS # BLD: 0 K/UL (ref 0–0.1)
BASOPHILS NFR BLD: 0 % (ref 0–2)
BILIRUB DIRECT SERPL-MCNC: <0.1 MG/DL (ref 0–0.2)
BILIRUB SERPL-MCNC: 0.2 MG/DL (ref 0.2–1)
BILIRUB UR QL: NEGATIVE
COLOR UR: YELLOW
DIFFERENTIAL METHOD BLD: ABNORMAL
EOSINOPHIL # BLD: 0 K/UL (ref 0–0.4)
EOSINOPHIL NFR BLD: 0 % (ref 0–5)
EPITH CASTS URNS QL MICRO: ABNORMAL /LPF (ref 0–5)
ERYTHROCYTE [DISTWIDTH] IN BLOOD BY AUTOMATED COUNT: 14.5 % (ref 11.6–14.5)
GLOBULIN SER CALC-MCNC: 3.3 G/DL (ref 2–4)
GLUCOSE UR STRIP.AUTO-MCNC: NEGATIVE MG/DL
HCT VFR BLD AUTO: 22.7 % (ref 36–48)
HGB BLD-MCNC: 7.4 G/DL (ref 13–16)
HGB UR QL STRIP: ABNORMAL
KETONES UR QL STRIP.AUTO: NEGATIVE MG/DL
LEUKOCYTE ESTERASE UR QL STRIP.AUTO: ABNORMAL
LYMPHOCYTES # BLD: 0.7 K/UL (ref 0.9–3.6)
LYMPHOCYTES NFR BLD: 8 % (ref 21–52)
MAGNESIUM SERPL-MCNC: 1.8 MG/DL (ref 1.6–2.6)
MCH RBC QN AUTO: 27.2 PG (ref 24–34)
MCHC RBC AUTO-ENTMCNC: 32.6 G/DL (ref 31–37)
MCV RBC AUTO: 83.5 FL (ref 78–100)
MONOCYTES # BLD: 0.4 K/UL (ref 0.05–1.2)
MONOCYTES NFR BLD: 4 % (ref 3–10)
NEUTS SEG # BLD: 7.6 K/UL (ref 1.8–8)
NEUTS SEG NFR BLD: 87 % (ref 40–73)
NITRITE UR QL STRIP.AUTO: NEGATIVE
PH UR STRIP: 5 [PH] (ref 5–8)
PLATELET # BLD AUTO: 173 K/UL (ref 135–420)
PMV BLD AUTO: 11 FL (ref 9.2–11.8)
PROT SERPL-MCNC: 5 G/DL (ref 6.4–8.2)
PROT UR STRIP-MCNC: 100 MG/DL
RBC # BLD AUTO: 2.72 M/UL (ref 4.35–5.65)
RBC #/AREA URNS HPF: ABNORMAL /HPF (ref 0–5)
SP GR UR REFRACTOMETRY: 1.02 (ref 1–1.03)
UROBILINOGEN UR QL STRIP.AUTO: 1 EU/DL (ref 0.2–1)
WBC # BLD AUTO: 8.8 K/UL (ref 4.6–13.2)
WBC URNS QL MICRO: ABNORMAL /HPF (ref 0–5)

## 2021-10-02 PROCEDURE — 97116 GAIT TRAINING THERAPY: CPT

## 2021-10-02 PROCEDURE — 87086 URINE CULTURE/COLONY COUNT: CPT

## 2021-10-02 PROCEDURE — 85025 COMPLETE CBC W/AUTO DIFF WBC: CPT

## 2021-10-02 PROCEDURE — 97163 PT EVAL HIGH COMPLEX 45 MIN: CPT

## 2021-10-02 PROCEDURE — 94640 AIRWAY INHALATION TREATMENT: CPT

## 2021-10-02 PROCEDURE — 65270000029 HC RM PRIVATE

## 2021-10-02 PROCEDURE — 74011000250 HC RX REV CODE- 250: Performed by: INTERNAL MEDICINE

## 2021-10-02 PROCEDURE — 80076 HEPATIC FUNCTION PANEL: CPT

## 2021-10-02 PROCEDURE — 74011250637 HC RX REV CODE- 250/637: Performed by: INTERNAL MEDICINE

## 2021-10-02 PROCEDURE — 74011250636 HC RX REV CODE- 250/636: Performed by: INTERNAL MEDICINE

## 2021-10-02 PROCEDURE — 83735 ASSAY OF MAGNESIUM: CPT

## 2021-10-02 PROCEDURE — 74011250637 HC RX REV CODE- 250/637: Performed by: HOSPITALIST

## 2021-10-02 PROCEDURE — 77010033678 HC OXYGEN DAILY

## 2021-10-02 PROCEDURE — 97530 THERAPEUTIC ACTIVITIES: CPT

## 2021-10-02 PROCEDURE — 85730 THROMBOPLASTIN TIME PARTIAL: CPT

## 2021-10-02 PROCEDURE — 81001 URINALYSIS AUTO W/SCOPE: CPT

## 2021-10-02 PROCEDURE — 99218 HC RM OBSERVATION: CPT

## 2021-10-02 PROCEDURE — 36415 COLL VENOUS BLD VENIPUNCTURE: CPT

## 2021-10-02 PROCEDURE — 74011000258 HC RX REV CODE- 258: Performed by: INTERNAL MEDICINE

## 2021-10-02 PROCEDURE — 94760 N-INVAS EAR/PLS OXIMETRY 1: CPT

## 2021-10-02 RX ORDER — POLYETHYLENE GLYCOL 3350 17 G/17G
17 POWDER, FOR SOLUTION ORAL DAILY
Status: DISCONTINUED | OUTPATIENT
Start: 2021-10-02 | End: 2021-10-07 | Stop reason: HOSPADM

## 2021-10-02 RX ADMIN — DILTIAZEM HYDROCHLORIDE 30 MG: 30 TABLET, FILM COATED ORAL at 09:05

## 2021-10-02 RX ADMIN — TAMSULOSIN HYDROCHLORIDE 0.4 MG: 0.4 CAPSULE ORAL at 19:09

## 2021-10-02 RX ADMIN — BUDESONIDE 500 MCG: 0.5 INHALANT RESPIRATORY (INHALATION) at 19:53

## 2021-10-02 RX ADMIN — HEPARIN SODIUM 18 UNITS/KG/HR: 10000 INJECTION, SOLUTION INTRAVENOUS at 15:50

## 2021-10-02 RX ADMIN — DILTIAZEM HYDROCHLORIDE 30 MG: 30 TABLET, FILM COATED ORAL at 11:42

## 2021-10-02 RX ADMIN — PIPERACILLIN AND TAZOBACTAM 3.38 G: 3; .375 INJECTION, POWDER, LYOPHILIZED, FOR SOLUTION INTRAVENOUS at 01:18

## 2021-10-02 RX ADMIN — COLLAGENASE SANTYL: 250 OINTMENT TOPICAL at 09:05

## 2021-10-02 RX ADMIN — Medication 500 MG: at 09:05

## 2021-10-02 RX ADMIN — FUROSEMIDE 20 MG: 20 TABLET ORAL at 09:04

## 2021-10-02 RX ADMIN — FERROUS SULFATE TAB 325 MG (65 MG ELEMENTAL FE) 325 MG: 325 (65 FE) TAB at 16:57

## 2021-10-02 RX ADMIN — IPRATROPIUM BROMIDE AND ALBUTEROL SULFATE 3 ML: .5; 3 SOLUTION RESPIRATORY (INHALATION) at 16:00

## 2021-10-02 RX ADMIN — Medication 1 TABLET: at 09:05

## 2021-10-02 RX ADMIN — PIPERACILLIN AND TAZOBACTAM 3.38 G: 3; .375 INJECTION, POWDER, LYOPHILIZED, FOR SOLUTION INTRAVENOUS at 19:09

## 2021-10-02 RX ADMIN — DILTIAZEM HYDROCHLORIDE 30 MG: 30 TABLET, FILM COATED ORAL at 16:57

## 2021-10-02 RX ADMIN — PIPERACILLIN AND TAZOBACTAM 3.38 G: 3; .375 INJECTION, POWDER, LYOPHILIZED, FOR SOLUTION INTRAVENOUS at 14:17

## 2021-10-02 RX ADMIN — PIPERACILLIN AND TAZOBACTAM 3.38 G: 3; .375 INJECTION, POWDER, LYOPHILIZED, FOR SOLUTION INTRAVENOUS at 06:31

## 2021-10-02 RX ADMIN — ARFORMOTEROL TARTRATE 15 MCG: 15 SOLUTION RESPIRATORY (INHALATION) at 07:29

## 2021-10-02 RX ADMIN — FAMOTIDINE 20 MG: 20 TABLET ORAL at 09:04

## 2021-10-02 RX ADMIN — Medication 250 MG: at 23:10

## 2021-10-02 RX ADMIN — BUDESONIDE 500 MCG: 0.5 INHALANT RESPIRATORY (INHALATION) at 07:29

## 2021-10-02 RX ADMIN — FERROUS SULFATE TAB 325 MG (65 MG ELEMENTAL FE) 325 MG: 325 (65 FE) TAB at 09:05

## 2021-10-02 RX ADMIN — ARFORMOTEROL TARTRATE 15 MCG: 15 SOLUTION RESPIRATORY (INHALATION) at 19:53

## 2021-10-02 RX ADMIN — Medication 250 MG: at 09:05

## 2021-10-02 NOTE — PROGRESS NOTES
Hospitalist Progress Note    Patient: Vivian La MRN: [id number]  [facility]: [id number]    YOB: 1945  Age: 64 y.o. Sex: male    DOA: 9/30/2021 LOS:  LOS: 0 days            Patient Active Problem List   Diagnosis Code    Hypertension I10    Chronic obstructive pulmonary disease ([facility] [address] J44.9    Chronic renal disease, stage 3, moderately decreased glomerular filtration rate between 30-59 mL/min/1.73 square meter ([facility]) N18.30    Age-related physical debility R54    Chronic respiratory failure with hypoxia ([facility]) J96.11    Tobacco dependence F17.200    Left hip pain M25.552    PAF (paroxysmal atrial fibrillation) ([facility]) I48.0    Avascular necrosis of bone of left hip ([facility]) M87.052    Obesity E66.9    Acute pulmonary edema ([facility]) J81.0    Anemia D64.9    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 02XA    Scrotal edema N50.89    Open wound of left hip S71.002A    Intercondylar fracture of femur ([facility]) S72.413A    Left leg pain M79.605    Open wound of left ankle S91.002A    Infected wound T14. 8XXA, L08.9    Supplemental oxygen dependent Z99.81    MRSA (methicillin resistant staph aureus) culture positive Z22.322    Pyogenic inflammation of bone ([facility]) M86.9    Osteomyelitis of left ankle ([facility]) M86.9    Cellulitis of left ankle L03. 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 ([address] 08/23/2021    Open wound of left ankle 08/20/2021    Infected wound 08/20/2021    Supplemental oxygen dependent 08/20/2021    MRSA (methicillin resistant staph aureus) culture positive 08/20/2021    Pyogenic inflammation of bone ([address] 08/20/2021    Osteomyelitis of left ankle ([facility] [address] 08/20/2021    Intercondylar fracture of femur ([facility] [address] 07/12/2021    Left leg pain 07/12/2021    Open wound of left hip 05/26/2021    Scrotal edema 04/23/2021    Hematoma of left hip 04/19/2021    Acute hip pain 04/18/2021    Status post total replacement of left hip 04/14/2021    Anemia 04/11/2021    Obesity 04/10/2021    Acute pulmonary edema (H[facility]) 04/10/2021    Avascular necrosis of bone of left hip ([address] 04/08/2021    Left hip pain 04/05/2021    PAF (paroxysmal atrial fibrillation) ([facility] [address] 04/05/2021    Tobacco dependence 02/21/2020    Chronic respiratory failure with hypoxia ([facility] [address] 08/19/2019    Age-related physical debility 07/08/2019    Chronic renal disease, stage 3, moderately decreased glomerular filtration rate between 30-59 mL/min/1.73 square meter ([address] 07/20/2018    Chronic obstructive pulmonary disease ([facility] [address] 04/20/2018    Hypertension      Principal Problem:    Febrile illness (9/30/2021)    Active Problems:    COPD with acute exacerbation ([address] (9/30/2021)      DVT (deep venous thrombosis) ([facility]) (10/1/2021)      Fever/possible sepsis     LE cellulitis -  Continue with antibiotics     Recent right ankle open wound, cultures growing MRSA on chronic doxycycline.     ID and podiatry consult appricated  Bone scan-- unremarkable    Fu UC     Right leg DVT - on iV hep    Hematuria - improving.         hypertension - bp/hr stable     Chronic COPD with chronic respiratory failure and oxygen supplementation-  Resume home medication  DuoNebs as needed  Oxygen supplementation     Chronic renal disease stage III - cr is stable        Paroxysmal atrial fibrillation - hr stable        Anemia - due to ACD and acute blood loss. Transfuse if hgb <7. Check iron profile       Recent Intercondylar fracture of left distal femur -   Conservative management recommended.      Patient's condition is fair        Recommend to continue hospitalization. Discussed with patient. Chief Complaints:   Chief Complaint   Patient presents with    Fever    Fall   Shelli Meyer a 64 y. o. male with PMHX hypertension, COPD, CRD, prostate cancer, brain aneurysm, on home O2  due to chronic respiratory failure and medical noncompliance. fracture of left ankle. Multiple admissions to this hospital.  SUBJECTIVE:  Pt is seen and examined. Chart reviewed  Feels okay        Review of systems:    Review of Systems   Constitutional: Positive for malaise/fatigue. HENT: Negative. Eyes: Negative. Respiratory: Negative for shortness of breath. Cardiovascular: Negative for chest pain, palpitations, orthopnea and leg swelling. Gastrointestinal: Negative. Negative for abdominal pain, diarrhea and heartburn. Genitourinary: Negative for dysuria and hematuria. Skin: Negative. Neurological: Positive for weakness. Psychiatric/Behavioral: Negative for depression, substance abuse and suicidal ideas. The patient is not nervous/anxious.         PE:  Patient Vitals for the past 24 hrs:   BP Temp Pulse Resp SpO2 Height Weight   10/02/21 0829 103/60 97 °F (36.1 °C) 90 20 98 % -- --   10/02/21 0036 -- -- -- -- -- -- 85.9 kg (189 lb 4.8 oz)   10/02/21 0019 (!) 110/54 97.3 °F (36.3 °C) 68 20 99 % -- --   10/01/21 2014 -- -- -- -- 99 % -- --   10/01/21 1940 (!) 113/55 97.8 °F (36.6 °C) 64 22 99 % -- --   10/01/21 1819 -- -- 69 -- -- -- --   10/01/21 1711 (!) 115/56 98 °F (36.7 °C) 73 22 99 % -- --   10/01/21 1345 -- -- -- -- -- 5' 9.02\" (1.753 m) --   10/01/21 1243 (!) 117/47 97.9 °F (36.6 °C) 68 22 100 % -- --       Intake/Output Summary (Last 24 hours) at 10/2/2021 0908  Last data filed at 10/1/2021 1940  Gross per 24 hour   Intake 400 ml   Output 600 ml   Net -200 ml     Patient Vitals for the past 120 hrs:   Weight   09/30/21 2009 91.6 kg (201 lb 14.4 oz)   10/02/21 0036 85.9 kg (189 lb 4.8 oz)         Physical Exam  Vitals and nursing note reviewed. Constitutional:       General: He is in acute distress. Appearance: He is ill-appearing. Neck:      Vascular: No JVD. Cardiovascular:      Rate and Rhythm: Normal rate and regular rhythm. Heart sounds: Normal heart sounds. Pulmonary:      Effort: No respiratory distress. Breath sounds: Normal breath sounds. Abdominal:      General: Bowel sounds are normal. There is no distension. Palpations: Abdomen is soft. Tenderness: There is no abdominal tenderness. There is no rebound. Musculoskeletal:         General: Normal range of motion. Cervical back: Normal range of motion and neck supple. Skin:     General: Skin is warm and dry. Neurological:      Mental Status: He is alert and oriented to person, place, and time. Psychiatric:         Mood and Affect: Affect normal.             Intake and Output:  Current Shift:  No intake/output data recorded. Last three shifts:  09/30 1901 - 10/02 0700  In: 740 [P.O.:240; I.V.:500]  Out: 600 [Urine:600]    Lab/Data Reviewed:  Recent Results (from the past 8 hour(s))   MAGNESIUM    Collection Time: 10/02/21  3:25 AM   Result Value Ref Range    Magnesium 1.8 1.6 - 2.6 mg/dL   HEPATIC FUNCTION PANEL    Collection Time: 10/02/21  3:25 AM   Result Value Ref Range    Protein, total 5.0 (L) 6.4 - 8.2 g/dL    Albumin 1.7 (L) 3.4 - 5.0 g/dL    Globulin 3.3 2.0 - 4.0 g/dL    A-G Ratio 0.5 (L) 0.8 - 1.7      Bilirubin, total 0.2 0.2 - 1.0 MG/DL    Bilirubin, direct <0.1 0.0 - 0.2 MG/DL    Alk.  phosphatase 59 45 - 117 U/L    AST (SGOT) 9 (L) 10 - 38 U/L    ALT (SGPT) 10 (L) 16 - 61 U/L   CBC WITH AUTOMATED DIFF    Collection Time: 10/02/21  3:25 AM   Result Value Ref Range    WBC 8.8 4.6 - 13.2 K/uL    RBC 2.72 (L) 4.35 - 5.65 M/uL    HGB 7.4 (L) 13.0 - 16.0 g/dL    HCT 22.7 (L) 36.0 - 48.0 %    MCV 83.5 78.0 - 100.0 FL    MCH 27.2 24.0 - 34.0 PG    MCHC 32.6 31.0 - 37.0 g/dL    RDW 14.5 11.6 - 14.5 %    PLATELET 896 707 - 219 K/uL    MPV 11.0 9.2 - 11.8 FL    NEUTROPHILS 87 (H) 40 - 73 %    LYMPHOCYTES 8 (L) 21 - 52 %    MONOCYTES 4 3 - 10 %    EOSINOPHILS 0 0 - 5 %    BASOPHILS 0 0 - 2 %    ABS. NEUTROPHILS 7.6 1.8 - 8.0 K/UL    ABS. LYMPHOCYTES 0.7 (L) 0.9 - 3.6 K/UL    ABS. MONOCYTES 0.4 0.05 - 1.2 K/UL    ABS. EOSINOPHILS 0.0 0.0 - 0.4 K/UL    ABS.  BASOPHILS 0.0 0.0 - 0.1 K/UL    DF AUTOMATED     PTT    Collection Time: 10/02/21  3:25 AM   Result Value Ref Range    aPTT 92.6 (H) 23.0 - 36.4 SEC   URINALYSIS W/ RFLX MICROSCOPIC    Collection Time: 10/02/21  6:18 AM   Result Value Ref Range    Color YELLOW      Appearance TURBID      Specific gravity 1.019 1.005 - 1.030      pH (UA) 5.0 5.0 - 8.0      Protein 100 (A) NEG mg/dL    Glucose Negative NEG mg/dL    Ketone Negative NEG mg/dL    Bilirubin Negative NEG      Blood LARGE (A) NEG      Urobilinogen 1.0 0.2 - 1.0 EU/dL    Nitrites Negative NEG      Leukocyte Esterase SMALL (A) NEG     URINE MICROSCOPIC ONLY    Collection Time: 10/02/21  6:18 AM   Result Value Ref Range    WBC 4 to 10 0 - 5 /hpf    RBC TOO NUMEROUS TO COUNT 0 - 5 /hpf    Epithelial cells 1+ 0 - 5 /lpf    Bacteria 3+ (A) NEG /hpf     Medications:  Current Facility-Administered Medications   Medication Dose Route Frequency    polyethylene glycol (MIRALAX) packet 17 g  17 g Oral DAILY    piperacillin-tazobactam (ZOSYN) 3.375 g in 0.9% sodium chloride (MBP/ADV) 100 mL MBP  3.375 g IntraVENous Q6H    acetaminophen (TYLENOL) tablet 650 mg  650 mg Oral Q8H PRN    albuterol (PROVENTIL HFA, VENTOLIN HFA, PROAIR HFA) inhaler 2 Puff  2 Puff Inhalation Q4H PRN    albuterol-ipratropium (DUO-NEB) 2.5 MG-0.5 MG/3 ML  3 mL Nebulization Q4H PRN    collagenase (SANTYL) 250 unit/gram ointment   Topical DAILY    dilTIAZem IR (CARDIZEM) tablet 30 mg  30 mg Oral TIDAC    famotidine (PEPCID) tablet 20 mg  20 mg Oral DAILY    ferrous sulfate tablet 325 mg  325 mg Oral BID WITH MEALS    furosemide (LASIX) tablet 20 mg  20 mg Oral DAILY    tamsulosin (FLOMAX) capsule 0.4 mg  0.4 mg Oral QPM    acetaminophen (TYLENOL) tablet 650 mg  650 mg Oral Q6H PRN    Or    acetaminophen (TYLENOL) suppository 650 mg  650 mg Rectal Q6H PRN    polyethylene glycol (MIRALAX) packet 17 g  17 g Oral DAILY PRN    ondansetron (ZOFRAN ODT) tablet 4 mg  4 mg Oral Q8H PRN    Or    ondansetron (ZOFRAN) injection 4 mg  4 mg IntraVENous Q6H PRN    arformoteroL (BROVANA) neb solution 15 mcg  15 mcg Nebulization BID RT    budesonide (PULMICORT) 500 mcg/2 ml nebulizer suspension  500 mcg Nebulization BID RT    multivitamin, tx-iron-ca-min (THERA-M w/ IRON) tablet 1 Tablet  1 Tablet Oral DAILY    ascorbic acid (vitamin C) (VITAMIN C) tablet 500 mg  500 mg Oral DAILY    Saccharomyces boulardii (FLORASTOR) capsule 250 mg  250 mg Oral BID    heparin 25,000 units in D5W 250 ml infusion  18-36 Units/kg/hr IntraVENous TITRATE       Recent Results (from the past 24 hour(s))   CULTURE, WOUND W GRAM STAIN    Collection Time: 10/01/21 11:15 AM    Specimen: Wound Drainage   Result Value Ref Range    Special Requests: NO SPECIAL REQUESTS      GRAM STAIN FEW WBCS SEEN      GRAM STAIN NO ORGANISMS SEEN      Culture result: PENDING    PTT    Collection Time: 10/01/21  9:06 PM   Result Value Ref Range    aPTT 37.0 (H) 23.0 - 36.4 SEC   MAGNESIUM    Collection Time: 10/02/21  3:25 AM   Result Value Ref Range    Magnesium 1.8 1.6 - 2.6 mg/dL   HEPATIC FUNCTION PANEL    Collection Time: 10/02/21  3:25 AM   Result Value Ref Range    Protein, total 5.0 (L) 6.4 - 8.2 g/dL    Albumin 1.7 (L) 3.4 - 5.0 g/dL    Globulin 3.3 2.0 - 4.0 g/dL    A-G Ratio 0.5 (L) 0.8 - 1.7      Bilirubin, total 0.2 0.2 - 1.0 MG/DL    Bilirubin, direct <0.1 0.0 - 0.2 MG/DL    Alk.  phosphatase 59 45 - 117 U/L    AST (SGOT) 9 (L) 10 - 38 U/L    ALT (SGPT) 10 (L) 16 - 61 U/L   CBC WITH AUTOMATED DIFF    Collection Time: 10/02/21  3:25 AM   Result Value Ref Range    WBC 8.8 4.6 - 13.2 K/uL    RBC 2.72 (L) 4.35 - 5.65 M/uL    HGB 7.4 (L) 13.0 - 16.0 g/dL    HCT 22.7 (L) 36.0 - 48.0 %    MCV 83.5 78.0 - 100.0 FL    MCH 27.2 24.0 - 34.0 PG    MCHC 32.6 31.0 - 37.0 g/dL    RDW 14.5 11.6 - 14.5 %    PLATELET 584 780 - 301 K/uL    MPV 11.0 9.2 - 11.8 FL    NEUTROPHILS 87 (H) 40 - 73 %    LYMPHOCYTES 8 (L) 21 - 52 %    MONOCYTES 4 3 - 10 %    EOSINOPHILS 0 0 - 5 %    BASOPHILS 0 0 - 2 %    ABS. NEUTROPHILS 7.6 1.8 - 8.0 K/UL    ABS. LYMPHOCYTES 0.7 (L) 0.9 - 3.6 K/UL    ABS. MONOCYTES 0.4 0.05 - 1.2 K/UL    ABS. EOSINOPHILS 0.0 0.0 - 0.4 K/UL    ABS.  BASOPHILS 0.0 0.0 - 0.1 K/UL    DF AUTOMATED     PTT    Collection Time: 10/02/21  3:25 AM   Result Value Ref Range    aPTT 92.6 (H) 23.0 - 36.4 SEC   URINALYSIS W/ RFLX MICROSCOPIC    Collection Time: 10/02/21  6:18 AM   Result Value Ref Range    Color YELLOW      Appearance TURBID      Specific gravity 1.019 1.005 - 1.030      pH (UA) 5.0 5.0 - 8.0      Protein 100 (A) NEG mg/dL    Glucose Negative NEG mg/dL    Ketone Negative NEG mg/dL    Bilirubin Negative NEG      Blood LARGE (A) NEG      Urobilinogen 1.0 0.2 - 1.0 EU/dL    Nitrites Negative NEG      Leukocyte Esterase SMALL (A) NEG     URINE MICROSCOPIC ONLY    Collection Time: 10/02/21  6:18 AM   Result Value Ref Range    WBC 4 to 10 0 - 5 /hpf    RBC TOO NUMEROUS TO COUNT 0 - 5 /hpf    Epithelial cells 1+ 0 - 5 /lpf    Bacteria 3+ (A) NEG /hpf       Procedures/imaging: see electronic medical records for all procedures/Xrays and details which were not copied into this note but were reviewed prior to creation of Sofia Anthony MD   10/2/2021, 9:08 AM

## 2021-10-02 NOTE — PROGRESS NOTES
Occupational Therapy Evaluation Attempt     OT eval orders received. Chart reviewed. Attempted Occupational Therapy Evaluation, however, patient unable to be seen due to:  []  Nausea/vomiting  []  Eating  []  Pain  [x]  Patient too lethargic  []  Off Unit for testing/procedure  []  Dialysis treatment in progress  []  Telemetry Results  []  Other:     Second attempt: pt reports fatigue and requests to rest.      Will f/u later as patient's schedule allows.   Kat Leonardo, OTR/L

## 2021-10-02 NOTE — ROUTINE PROCESS
Bedside and Verbal shift change report given to Trinity Cruz RN (oncoming nurse) by Kush Turk RN (offgoing nurse). Report included the following information SBAR, Kardex, Intake/Output, MAR, Recent Results, and Cardiac Rhythm: SR/Afib.

## 2021-10-02 NOTE — PROGRESS NOTES
2200 Informed patient that a urine sample is needed. He expressed understanding and agreed he will use the clean urinal provided or call for help as needed. 0200 The patient states he does not need to urinate. He will use the urinal and call when he does need to.    0330 The patient requested that his brief be changed. A small amount of urine is noted, and he does not need to go anymore. No blood was visible in the urine. 0345 bladder scan shows 104mL     0530 The patient is in Afib with a heart rate of 64. He is asymptomatic and has a history of PAF.     0700 The patient is still in Afib with no symptoms.

## 2021-10-02 NOTE — PROGRESS NOTES
Problem: Mobility Impaired (Adult and Pediatric)  Goal: *Acute Goals and Plan of Care (Insert Text)  Outcome: Progressing Towards Goal  Note: Physical Therapy Goals  Initiated 10/2/2021 and to be accomplished within 7 day(s)  1. Patient will move from supine to sit and sit to supine  in bed with supervision/set-up. 2.  Patient will transfer from bed to chair and chair to bed with supervision/set-up using the least restrictive device. 3.  Patient will perform sit to stand with supervision/set-up. 4.  Patient will ambulate with supervision/set-up for 50 feet with the least restrictive device. 5.  Patient will ascend/descend 6 stairs with B handrail(s) with minimal assistance/contact guard assist.     Prior Level of Function:   Patient was recently at [facility] ([facility]) and per chart review, pt working with therapists at [facility] - pt only able to take 5 steps w/ RW, mod A. Pt performs STS with min A. Patient lives with wife in a mobile home with 6 JOLYNN, B railing. Pt reports he sits on a ledge in the shower to bathe. Pt is extremely [place] -bilateral.       PHYSICAL THERAPY EVALUATION    Patient: Kana Lyon (16 y.o. male)  Date: 10/2/2021  Primary Diagnosis: Febrile illness [R50.9]  COPD with acute exacerbation (HCC) [J44.1]        Precautions:   Fall, Skin CONTACT for MRSA, HOB > 30 deg, WBAT per notes, extremely [place] - bilateral    ASSESSMENT :  Based on the objective data described below, the patient presents with decreased strength, activity tolerance, balance affecting functional mobility. RN ok's PT eval. Pt is on IV anticoagulation therapy d/t clot found previously. Pt supine upon arrival, agreeable to tx. Supine to sit at R EOB with mod A. Pt assisted to scoot towards EOB, feet on floor to improve sitting balance/posture. STS w/ RW, GB, min A (bed has to be elevated for mechanical advantage). Pt only able to tolerate ~2 mins in standing before he has to sit down.   Pt able to perform minimal pre-gait marching and small shuffling side steps for 1' towards [facility] with RW, GB, min A. Pt reports pain when weight bearing on L foot. Pt returns to supine upon request (reports fatigue). HOB elevated to 30 deg. Throughout session, pt maintains O2 saturations at % with 2L NC. Pt left with all needs within reach, RN updated. Patient will benefit from skilled intervention to address the above impairments. Patient's rehabilitation potential is considered to be Fair  Factors which may influence rehabilitation potential include:   []         None noted  []         Mental ability/status  [x]         Medical condition  [x]         Home/family situation and support systems  [x]         Safety awareness  [x]         Pain tolerance/management  []         Other:      PLAN :  Recommendations and Planned Interventions:   [x]           Bed Mobility Training             [x]    Neuromuscular Re-Education  [x]           Transfer Training                   []    Orthotic/Prosthetic Training  [x]           Gait Training                          []    Modalities  [x]           Therapeutic Exercises           []    Edema Management/Control  [x]           Therapeutic Activities            [x]    Family Training/Education  [x]           Patient Education  []           Other (comment):    Frequency/Duration: Patient will be followed by physical therapy 1-2 times per day/4-7 days per week to address goals. Discharge Recommendations: Rec SNF. However, if patient goes home, he will needs HHPT as well as additional equipment to allow him to navigate safely in his home considering he is unable to ambulate even to the bathroom at this time. Further Equipment Recommendations for Discharge: TBD     SUBJECTIVE:   Patient stated I can't walk because of my left foot and I will fall.     OBJECTIVE DATA SUMMARY:     Past Medical History:   Diagnosis Date    Brain aneurysm     Brain aneurysm     Cancer ([facility] [address]     prostate    CHF (congestive heart failure) (HCC)     CKD (chronic kidney disease)     Closed nondisplaced supracondylar fracture of lower end of left femur without intracondylar extension with delayed healing     COPD [facility] obstructive [facility] disease) [facility] [address]     Debility     History of home oxygen therapy     [place] (hard of hearing)     Hypertension     Nocturia     On home oxygen therapy     PAF (paroxysmal atrial fibrillation) ([facility] [address]     Pneumonia     Prostate CA ([facility] [address]     Prostate neoplasm     Radiation effect      Past Surgical History:   Procedure Laterality Date    HX HERNIA REPAIR      HX HIP ARTHROSCOPY  04/09/2021     Barriers to Learning/Limitations: yes;  sensory deficits-hearing  Compensate with: Visual Cues and Tactile Cues  Home Situation:  Home Situation  Home Environment: Trailer/mobile home  # Steps to Enter: 6  Rails to Enter: Yes  Hand Rails : Bilateral  Wheelchair Ramp: No  One/Two Story Residence: One story  Living Alone: No  Support Systems: Spouse/Significant Other  Patient Expects to be Discharged to[de-identified] Trailer/mobile home  Current DME Used/Available at Home: Walker, rolling, Cane, straight  Tub or Shower Type: Shower (pt reports he sits on a ledge in the shower to bathe)  Critical Behavior:  Neurologic State: Alert; Appropriate for age  Orientation Level: Oriented X4  Cognition: Follows commands  Safety/Judgement: Awareness of environment  Psychosocial  Patient Behaviors: Calm; Cooperative  Strength:    Strength: Generally decreased, functional   Tone & Sensation:   Tone: Normal  Range Of Motion:  AROM: Generally decreased, functional  Functional Mobility:  Bed Mobility:  Rolling: Contact guard assistance  Supine to Sit: Moderate assistance  Sit to Supine: Contact guard assistance  Scooting: Total assistance; Adaptive equipment (with hercules bed draw sheet)  Transfers:  Sit to Stand: Adaptive equipment;Minimum assistance (bed has to be elevated for mechanical advantage)  Stand to Sit: Minimum assistance  Balance:   Sitting: Intact; With support  Standing: Impaired  Standing - Static: Fair  Standing - Dynamic : Poor  Ambulation/Gait Training:  Side steps for only 1 foot with RW, GB, CGA  Shuffling steps, poor weight shifting, needs cues for sequencing, RW management, energy conservation, PLB. Pain:  Pain level pre-treatment: 0/10   Pain level post-treatment: 0/10   Pain noted only during weight bearing on L foot    Activity Tolerance:   Fair-     Please refer to the flowsheet for vital signs taken during this treatment. After treatment:   []         Patient left in no apparent distress sitting up in chair  [x]         Patient left in no apparent distress in bed  [x]         Call bell left within reach  [x]         Nursing notified  []         Caregiver present  []         Bed alarm activated  []         SCDs applied    COMMUNICATION/EDUCATION:   [x]         Role of Physical Therapy in the acute care setting. [x]         Fall prevention education was provided and the patient/caregiver indicated understanding. [x]         Patient/family have participated as able in goal setting and plan of care. [x]         Patient/family agree to work toward stated goals and plan of care. []         Patient understands intent and goals of therapy, but is neutral about his/her participation. []         Patient is unable to participate in goal setting/plan of care: ongoing with therapy staff.  []         Other:     Thank you for this referral.  Carmen Chawla, PT   Time Calculation: 40 mins      Eval Complexity: History: HIGH Complexity :3+ comorbidities / personal factors will impact the outcome/ POC Exam:HIGH Complexity : 4+ Standardized tests and measures addressing body structure, function, activity limitation and / or participation in recreation  Presentation: HIGH Complexity : Unstable and unpredictable characteristics  Clinical Decision Making:High Complexity   Overall Complexity:HIGH

## 2021-10-03 LAB
ALBUMIN SERPL-MCNC: 1.8 G/DL (ref 3.4–5)
ALBUMIN/GLOB SERPL: 0.5 {RATIO} (ref 0.8–1.7)
ALP SERPL-CCNC: 57 U/L (ref 45–117)
ALT SERPL-CCNC: 10 U/L (ref 16–61)
ANION GAP SERPL CALC-SCNC: 5 MMOL/L (ref 3–18)
APTT PPP: 159.5 SEC (ref 23–36.4)
APTT PPP: 76.3 SEC (ref 23–36.4)
AST SERPL-CCNC: 10 U/L (ref 10–38)
BASOPHILS # BLD: 0 K/UL (ref 0–0.1)
BASOPHILS NFR BLD: 0 % (ref 0–2)
BILIRUB SERPL-MCNC: 0.2 MG/DL (ref 0.2–1)
BUN SERPL-MCNC: 38 MG/DL (ref 7–18)
BUN/CREAT SERPL: 20 (ref 12–20)
CALCIUM SERPL-MCNC: 7.7 MG/DL (ref 8.5–10.1)
CHLORIDE SERPL-SCNC: 109 MMOL/L (ref 100–111)
CO2 SERPL-SCNC: 29 MMOL/L (ref 21–32)
CREAT SERPL-MCNC: 1.86 MG/DL (ref 0.6–1.3)
DIFFERENTIAL METHOD BLD: ABNORMAL
EOSINOPHIL # BLD: 0.3 K/UL (ref 0–0.4)
EOSINOPHIL NFR BLD: 3 % (ref 0–5)
ERYTHROCYTE [DISTWIDTH] IN BLOOD BY AUTOMATED COUNT: 15 % (ref 11.6–14.5)
GLOBULIN SER CALC-MCNC: 3.7 G/DL (ref 2–4)
GLUCOSE SERPL-MCNC: 91 MG/DL (ref 74–99)
HCT VFR BLD AUTO: 24.8 % (ref 36–48)
HGB BLD-MCNC: 7.8 G/DL (ref 13–16)
IRON SATN MFR SERPL: 22 % (ref 20–50)
IRON SERPL-MCNC: 45 UG/DL (ref 50–175)
LYMPHOCYTES # BLD: 1 K/UL (ref 0.9–3.6)
LYMPHOCYTES NFR BLD: 12 % (ref 21–52)
MAGNESIUM SERPL-MCNC: 1.7 MG/DL (ref 1.6–2.6)
MCH RBC QN AUTO: 27.6 PG (ref 24–34)
MCHC RBC AUTO-ENTMCNC: 31.5 G/DL (ref 31–37)
MCV RBC AUTO: 87.6 FL (ref 78–100)
MONOCYTES # BLD: 0.5 K/UL (ref 0.05–1.2)
MONOCYTES NFR BLD: 6 % (ref 3–10)
NEUTS SEG # BLD: 6.3 K/UL (ref 1.8–8)
NEUTS SEG NFR BLD: 75 % (ref 40–73)
PHOSPHATE SERPL-MCNC: 3.4 MG/DL (ref 2.5–4.9)
PLATELET # BLD AUTO: 193 K/UL (ref 135–420)
PMV BLD AUTO: 10.6 FL (ref 9.2–11.8)
POTASSIUM SERPL-SCNC: 3.6 MMOL/L (ref 3.5–5.5)
PROT SERPL-MCNC: 5.5 G/DL (ref 6.4–8.2)
RBC # BLD AUTO: 2.83 M/UL (ref 4.35–5.65)
SODIUM SERPL-SCNC: 143 MMOL/L (ref 136–145)
TIBC SERPL-MCNC: 203 UG/DL (ref 250–450)
WBC # BLD AUTO: 8.4 K/UL (ref 4.6–13.2)

## 2021-10-03 PROCEDURE — 94640 AIRWAY INHALATION TREATMENT: CPT

## 2021-10-03 PROCEDURE — 83550 IRON BINDING TEST: CPT

## 2021-10-03 PROCEDURE — 74011250637 HC RX REV CODE- 250/637: Performed by: INTERNAL MEDICINE

## 2021-10-03 PROCEDURE — 85025 COMPLETE CBC W/AUTO DIFF WBC: CPT

## 2021-10-03 PROCEDURE — 77010033678 HC OXYGEN DAILY

## 2021-10-03 PROCEDURE — 80053 COMPREHEN METABOLIC PANEL: CPT

## 2021-10-03 PROCEDURE — 74011250636 HC RX REV CODE- 250/636: Performed by: INTERNAL MEDICINE

## 2021-10-03 PROCEDURE — 83735 ASSAY OF MAGNESIUM: CPT

## 2021-10-03 PROCEDURE — 74011000250 HC RX REV CODE- 250: Performed by: INTERNAL MEDICINE

## 2021-10-03 PROCEDURE — 97166 OT EVAL MOD COMPLEX 45 MIN: CPT

## 2021-10-03 PROCEDURE — 36415 COLL VENOUS BLD VENIPUNCTURE: CPT

## 2021-10-03 PROCEDURE — 97535 SELF CARE MNGMENT TRAINING: CPT

## 2021-10-03 PROCEDURE — 84100 ASSAY OF PHOSPHORUS: CPT

## 2021-10-03 PROCEDURE — 65270000029 HC RM PRIVATE

## 2021-10-03 PROCEDURE — 85730 THROMBOPLASTIN TIME PARTIAL: CPT

## 2021-10-03 PROCEDURE — 99218 HC RM OBSERVATION: CPT

## 2021-10-03 PROCEDURE — 74011250637 HC RX REV CODE- 250/637: Performed by: HOSPITALIST

## 2021-10-03 PROCEDURE — 74011000258 HC RX REV CODE- 258: Performed by: INTERNAL MEDICINE

## 2021-10-03 RX ADMIN — TAMSULOSIN HYDROCHLORIDE 0.4 MG: 0.4 CAPSULE ORAL at 17:16

## 2021-10-03 RX ADMIN — PIPERACILLIN AND TAZOBACTAM 2.25 G: 2; .25 INJECTION, POWDER, LYOPHILIZED, FOR SOLUTION INTRAVENOUS at 20:07

## 2021-10-03 RX ADMIN — Medication 500 MG: at 08:10

## 2021-10-03 RX ADMIN — Medication 1 TABLET: at 08:10

## 2021-10-03 RX ADMIN — ARFORMOTEROL TARTRATE 15 MCG: 15 SOLUTION RESPIRATORY (INHALATION) at 19:35

## 2021-10-03 RX ADMIN — PIPERACILLIN AND TAZOBACTAM 3.38 G: 3; .375 INJECTION, POWDER, LYOPHILIZED, FOR SOLUTION INTRAVENOUS at 07:38

## 2021-10-03 RX ADMIN — COLLAGENASE SANTYL: 250 OINTMENT TOPICAL at 08:15

## 2021-10-03 RX ADMIN — FERROUS SULFATE TAB 325 MG (65 MG ELEMENTAL FE) 325 MG: 325 (65 FE) TAB at 16:26

## 2021-10-03 RX ADMIN — BUDESONIDE 500 MCG: 0.5 INHALANT RESPIRATORY (INHALATION) at 19:35

## 2021-10-03 RX ADMIN — IPRATROPIUM BROMIDE AND ALBUTEROL SULFATE 3 ML: .5; 3 SOLUTION RESPIRATORY (INHALATION) at 04:12

## 2021-10-03 RX ADMIN — DILTIAZEM HYDROCHLORIDE 30 MG: 30 TABLET, FILM COATED ORAL at 08:11

## 2021-10-03 RX ADMIN — Medication 250 MG: at 20:07

## 2021-10-03 RX ADMIN — Medication 250 MG: at 08:10

## 2021-10-03 RX ADMIN — ARFORMOTEROL TARTRATE 15 MCG: 15 SOLUTION RESPIRATORY (INHALATION) at 07:38

## 2021-10-03 RX ADMIN — DILTIAZEM HYDROCHLORIDE 30 MG: 30 TABLET, FILM COATED ORAL at 11:21

## 2021-10-03 RX ADMIN — BUDESONIDE 500 MCG: 0.5 INHALANT RESPIRATORY (INHALATION) at 07:38

## 2021-10-03 RX ADMIN — POLYETHYLENE GLYCOL 3350 17 G: 17 POWDER, FOR SOLUTION ORAL at 08:11

## 2021-10-03 RX ADMIN — PIPERACILLIN AND TAZOBACTAM 2.25 G: 2; .25 INJECTION, POWDER, LYOPHILIZED, FOR SOLUTION INTRAVENOUS at 14:19

## 2021-10-03 RX ADMIN — FAMOTIDINE 20 MG: 20 TABLET ORAL at 08:10

## 2021-10-03 RX ADMIN — HEPARIN SODIUM 15 UNITS/KG/HR: 10000 INJECTION, SOLUTION INTRAVENOUS at 11:21

## 2021-10-03 RX ADMIN — PIPERACILLIN AND TAZOBACTAM 3.38 G: 3; .375 INJECTION, POWDER, LYOPHILIZED, FOR SOLUTION INTRAVENOUS at 02:06

## 2021-10-03 RX ADMIN — FERROUS SULFATE TAB 325 MG (65 MG ELEMENTAL FE) 325 MG: 325 (65 FE) TAB at 08:11

## 2021-10-03 RX ADMIN — IPRATROPIUM BROMIDE AND ALBUTEROL SULFATE 3 ML: .5; 3 SOLUTION RESPIRATORY (INHALATION) at 14:19

## 2021-10-03 RX ADMIN — IPRATROPIUM BROMIDE AND ALBUTEROL SULFATE 3 ML: .5; 3 SOLUTION RESPIRATORY (INHALATION) at 09:05

## 2021-10-03 RX ADMIN — FUROSEMIDE 20 MG: 20 TABLET ORAL at 08:10

## 2021-10-03 RX ADMIN — DILTIAZEM HYDROCHLORIDE 30 MG: 30 TABLET, FILM COATED ORAL at 16:26

## 2021-10-03 NOTE — PROGRESS NOTES
Pharmacy Renal Dosing Services:    Zosyn was automatically dose-adjusted per [facility] P&T Committee Protocol, with respect to renal function. Consult provided for this   64 y.o. , male , for the indication of Skin and Soft Tissue Infection, CAP. Dose adjusted to:  Zosyn 2.25 grams IV q6h  Dose adjusted due to increase in Scr  = 1.86     Prior Scr = 1.64  on 9/30/21    Pt Weight:   Wt Readings from Last 1 Encounters:   10/02/21 85.9 kg (189 lb 6 oz)       Previous Regimen      Zosyn 3.375 grams IV q6h    Serum Creatinine Lab Results   Component Value Date/Time    Creatinine 1.86 (H) 10/03/2021 05:55 AM    Creatinine, POC 1.3 10/30/2019 03:27 PM       Creatinine Clearance Estimated Creatinine Clearance: 35.6 mL/min (A) (based on SCr of 1.86 mg/dL (H)). BUN Lab Results   Component Value Date/Time    BUN 38 (H) 10/03/2021 05:55 AM    BUN, POC 30 (H) 10/30/2019 03:27 PM         Pharmacy to continue to monitor patient daily. Will make dosage adjustments based upon changing renal function.   Signed Anatoly Morgan information:  [phone]

## 2021-10-03 NOTE — ROUTINE PROCESS
Bedside and Verbal shift change report given to Diana Banegas RN (oncoming nurse) by Elliott Obregon RN (offgoing nurse). Report included the following information SBAR and Kardex.

## 2021-10-03 NOTE — PROGRESS NOTES
Hospitalist Progress Note    Patient: Renetta Cervantes MRN: [id number]  CSN: [id number]    YOB: 1945  Age: 64 y.o. Sex: male    DOA: 9/30/2021 LOS:  LOS: 0 days            Patient Active Problem List   Diagnosis Code    Hypertension I10    Chronic obstructive pulmonary disease ([facility] [address] J44.9    Chronic renal disease, stage 3, moderately decreased glomerular filtration rate between 30-59 mL/min/1.73 square meter ([facility]) N18.30    Age-related physical debility R54    Chronic respiratory failure with hypoxia ([facility]) J96.11    Tobacco dependence F17.200    Left hip pain M25.552    PAF (paroxysmal atrial fibrillation) ([facility]) I48.0    Avascular necrosis of bone of left hip ([facility]) M87.052    Obesity E66.9    Acute pulmonary edema ([facility]) J81.0    Anemia D64.9    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 02XA    Scrotal edema N50.89    Open wound of left hip S71.002A    Intercondylar fracture of femur ([facility]) S72.413A    Left leg pain M79.605    Open wound of left ankle S91.002A    Infected wound T14. 8XXA, L08.9    Supplemental oxygen dependent Z99.81    MRSA (methicillin resistant staph aureus) culture positive Z22.322    Pyogenic inflammation of bone ([facility]) M86.9    Osteomyelitis of left ankle ([facility]) M86.9    Cellulitis of left ankle L03. 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 ([address] 08/23/2021    Open wound of left ankle 08/20/2021    Infected wound 08/20/2021    Supplemental oxygen dependent 08/20/2021    MRSA (methicillin resistant staph aureus) culture positive 08/20/2021    Pyogenic inflammation of bone ([address] 08/20/2021    Osteomyelitis of left ankle ([facility] [address] 08/20/2021    Intercondylar fracture of femur ([facility] [address] 07/12/2021    Left leg pain 07/12/2021    Open wound of left hip 05/26/2021    Scrotal edema 04/23/2021    Hematoma of left hip 04/19/2021    Acute hip pain 04/18/2021    Status post total replacement of left hip 04/14/2021    Anemia 04/11/2021    Obesity 04/10/2021    Acute pulmonary edema (HCC) 04/10/2021    Avascular necrosis of bone of left hip ([facility] [address] 04/08/2021    Left hip pain 04/05/2021    PAF (paroxysmal atrial fibrillation) ([facility] [address] 04/05/2021    Tobacco dependence 02/21/2020    Chronic respiratory failure with hypoxia ([facility] [address] 08/19/2019    Age-related physical debility 07/08/2019    Chronic renal disease, stage 3, moderately decreased glomerular filtration rate between 30-59 mL/min/1.73 square meter ([facility] [address] 07/20/2018    Chronic obstructive pulmonary disease ([facility] [address] 04/20/2018    Hypertension      Principal Problem:    Febrile illness (9/30/2021)    Active Problems:    COPD with acute exacerbation ([address] (9/30/2021)      DVT (deep venous thrombosis) ([facility]) (10/1/2021)      Fever/possible sepsis     LE cellulitis -  Continue with antibiotics     Recent right ankle open wound, cultures growing MRSA on chronic doxycycline.     ID and podiatry consult appricated  Bone scan-- unremarkable    Fu UC     Right leg DVT - on iV hep. Consider changing to po anticoag     Hematuria - improving. None further        hypertension - bp/hr stable     Chronic COPD with chronic respiratory failure and oxygen supplementation- cont currnt rx           Chronic renal disease stage III - cr is somewhat worse today. Will rechcek in a.  Avoid nephrotoxins.         Paroxysmal atrial fibrillation - hr stable    Anemia - due to ACD and acute blood loss. Transfuse if hgb <7. H/h improved. Iron profile pending     Recent Intercondylar fracture of left distal femur -   Conservative management recommended.        Weakness-- oob . Pt/ot  Patient's condition is fair        Recommend to continue hospitalization. Discussed with patient. Chief Complaints:   Chief Complaint   Patient presents with    Fever    Fall   Pee Mauro a 64 y. o. male with PMHX hypertension, COPD, CRD, prostate cancer, brain aneurysm, on home O2  due to chronic respiratory failure and medical noncompliance. fracture of left ankle. Multiple admissions to this hospital.  SUBJECTIVE:  Pt is seen and examined. \" I feel better but still very weak. I would like to work with PT\"  No further hematuria. Review of systems:    Review of Systems   Constitutional: Positive for malaise/fatigue. HENT: Negative. Eyes: Negative. Respiratory: Negative for shortness of breath. Cardiovascular: Negative for chest pain, palpitations, orthopnea and leg swelling. Gastrointestinal: Negative. Negative for abdominal pain, diarrhea and heartburn. Genitourinary: Negative for dysuria and hematuria. Skin: Negative. Neurological: Positive for weakness. Psychiatric/Behavioral: Negative for depression, substance abuse and suicidal ideas. The patient is not nervous/anxious.         PE:  Patient Vitals for the past 24 hrs:   BP Temp Pulse Resp SpO2 Height Weight   10/03/21 0905 -- -- -- -- 98 % -- --   10/03/21 0738 -- -- -- -- 99 % -- --   10/03/21 0551 (!) 106/48 97.8 °F (36.6 °C) (!) 115 20 98 % -- --   10/03/21 0412 -- -- -- -- 97 % -- --   10/02/21 2335 (!) 113/51 97.9 °F (36.6 °C) 65 20 97 % -- --   10/02/21 2011 (!) 100/42 98.1 °F (36.7 °C) 66 20 98 % -- --   10/02/21 1957 -- -- -- -- 98 % -- --   10/02/21 1925 -- -- -- -- 98 % 5' 9\" (1.753 m) 85.9 kg (189 lb 6 oz)   10/02/21 1647 (!) 106/43 98.1 °F (36.7 °C) 69 20 100 % -- -- 10/02/21 1601 -- -- -- -- 99 % -- --   10/02/21 1210 -- -- -- -- 97 % -- --   10/02/21 1201 (!) 115/56 98.1 °F (36.7 °C) 63 20 95 % -- --       Intake/Output Summary (Last 24 hours) at 10/3/2021 1009  Last data filed at 10/2/2021 1925  Gross per 24 hour   Intake 360 ml   Output 450 ml   Net -90 ml     Patient Vitals for the past 120 hrs:   Weight   09/30/21 2009 91.6 kg (201 lb 14.4 oz)   10/02/21 0036 85.9 kg (189 lb 4.8 oz)   10/02/21 1925 85.9 kg (189 lb 6 oz)         Physical Exam  Vitals and nursing note reviewed. Constitutional:       General: He is in acute distress. Appearance: He is ill-appearing. Neck:      Vascular: No JVD. Cardiovascular:      Rate and Rhythm: Normal rate and regular rhythm. Heart sounds: Normal heart sounds. Pulmonary:      Effort: No respiratory distress. Breath sounds: Normal breath sounds. Abdominal:      General: Bowel sounds are normal. There is no distension. Palpations: Abdomen is soft. Tenderness: There is no abdominal tenderness. There is no rebound. Musculoskeletal:         General: Normal range of motion. Cervical back: Normal range of motion and neck supple. Skin:     General: Skin is warm and dry. Neurological:      Mental Status: He is alert and oriented to person, place, and time. Psychiatric:         Mood and Affect: Affect normal.             Intake and Output:  Current Shift:  No intake/output data recorded. Last three shifts:  10/01 1901 - 10/03 0700  In: 1000 [P.O.:600;  I.V.:400]  Out: 600 [Urine:600]    Lab/Data Reviewed:  Recent Results (from the past 8 hour(s))   CBC WITH AUTOMATED DIFF    Collection Time: 10/03/21  5:55 AM   Result Value Ref Range    WBC 8.4 4.6 - 13.2 K/uL    RBC 2.83 (L) 4.35 - 5.65 M/uL    HGB 7.8 (L) 13.0 - 16.0 g/dL    HCT 24.8 (L) 36.0 - 48.0 %    MCV 87.6 78.0 - 100.0 FL    MCH 27.6 24.0 - 34.0 PG    MCHC 31.5 31.0 - 37.0 g/dL    RDW 15.0 (H) 11.6 - 14.5 %    PLATELET 123 886 - 711 K/uL MPV 10.6 9.2 - 11.8 FL    NEUTROPHILS 75 (H) 40 - 73 %    LYMPHOCYTES 12 (L) 21 - 52 %    MONOCYTES 6 3 - 10 %    EOSINOPHILS 3 0 - 5 %    BASOPHILS 0 0 - 2 %    ABS. NEUTROPHILS 6.3 1.8 - 8.0 K/UL    ABS. LYMPHOCYTES 1.0 0.9 - 3.6 K/UL    ABS. MONOCYTES 0.5 0.05 - 1.2 K/UL    ABS. EOSINOPHILS 0.3 0.0 - 0.4 K/UL    ABS. BASOPHILS 0.0 0.0 - 0.1 K/UL    DF AUTOMATED     MAGNESIUM    Collection Time: 10/03/21  5:55 AM   Result Value Ref Range    Magnesium 1.7 1.6 - 2.6 mg/dL   METABOLIC PANEL, COMPREHENSIVE    Collection Time: 10/03/21  5:55 AM   Result Value Ref Range    Sodium 143 136 - 145 mmol/L    Potassium 3.6 3.5 - 5.5 mmol/L    Chloride 109 100 - 111 mmol/L    CO2 29 21 - 32 mmol/L    Anion gap 5 3.0 - 18 mmol/L    Glucose 91 74 - 99 mg/dL    BUN 38 (H) 7.0 - 18 MG/DL    Creatinine 1.86 (H) 0.6 - 1.3 MG/DL    BUN/Creatinine ratio 20 12 - 20      GFR est AA 43 (L) >60 ml/min/1.73m2    GFR est non-AA 35 (L) >60 ml/min/1.73m2    Calcium 7.7 (L) 8.5 - 10.1 MG/DL    Bilirubin, total 0.2 0.2 - 1.0 MG/DL    ALT (SGPT) 10 (L) 16 - 61 U/L    AST (SGOT) 10 10 - 38 U/L    Alk.  phosphatase 57 45 - 117 U/L    Protein, total 5.5 (L) 6.4 - 8.2 g/dL    Albumin 1.8 (L) 3.4 - 5.0 g/dL    Globulin 3.7 2.0 - 4.0 g/dL    A-G Ratio 0.5 (L) 0.8 - 1.7     PHOSPHORUS    Collection Time: 10/03/21  5:55 AM   Result Value Ref Range    Phosphorus 3.4 2.5 - 4.9 MG/DL   PTT    Collection Time: 10/03/21  5:55 AM   Result Value Ref Range    aPTT 159.5 (HH) 23.0 - 36.4 SEC     Medications:  Current Facility-Administered Medications   Medication Dose Route Frequency    polyethylene glycol (MIRALAX) packet 17 g  17 g Oral DAILY    piperacillin-tazobactam (ZOSYN) 3.375 g in 0.9% sodium chloride (MBP/ADV) 100 mL MBP  3.375 g IntraVENous Q6H    albuterol (PROVENTIL HFA, VENTOLIN HFA, PROAIR HFA) inhaler 2 Puff  2 Puff Inhalation Q4H PRN    albuterol-ipratropium (DUO-NEB) 2.5 MG-0.5 MG/3 ML  3 mL Nebulization Q4H PRN    collagenase (SANTYL) 250 unit/gram ointment   Topical DAILY    dilTIAZem IR (CARDIZEM) tablet 30 mg  30 mg Oral TIDAC    famotidine (PEPCID) tablet 20 mg  20 mg Oral DAILY    ferrous sulfate tablet 325 mg  325 mg Oral BID WITH MEALS    furosemide (LASIX) tablet 20 mg  20 mg Oral DAILY    tamsulosin (FLOMAX) capsule 0.4 mg  0.4 mg Oral QPM    acetaminophen (TYLENOL) tablet 650 mg  650 mg Oral Q6H PRN    Or    acetaminophen (TYLENOL) suppository 650 mg  650 mg Rectal Q6H PRN    polyethylene glycol (MIRALAX) packet 17 g  17 g Oral DAILY PRN    ondansetron (ZOFRAN ODT) tablet 4 mg  4 mg Oral Q8H PRN    Or    ondansetron (ZOFRAN) injection 4 mg  4 mg IntraVENous Q6H PRN    arformoteroL (BROVANA) neb solution 15 mcg  15 mcg Nebulization BID RT    budesonide (PULMICORT) 500 mcg/2 ml nebulizer suspension  500 mcg Nebulization BID RT    multivitamin, tx-iron-ca-min (THERA-M w/ IRON) tablet 1 Tablet  1 Tablet Oral DAILY    ascorbic acid (vitamin C) (VITAMIN C) tablet 500 mg  500 mg Oral DAILY    Saccharomyces boulardii (FLORASTOR) capsule 250 mg  250 mg Oral BID    heparin 25,000 units in D5W 250 ml infusion  18-36 Units/kg/hr IntraVENous TITRATE       Recent Results (from the past 24 hour(s))   PTT    Collection Time: 10/02/21 10:35 AM   Result Value Ref Range    aPTT 96.0 (H) 23.0 - 36.4 SEC   CBC WITH AUTOMATED DIFF    Collection Time: 10/03/21  5:55 AM   Result Value Ref Range    WBC 8.4 4.6 - 13.2 K/uL    RBC 2.83 (L) 4.35 - 5.65 M/uL    HGB 7.8 (L) 13.0 - 16.0 g/dL    HCT 24.8 (L) 36.0 - 48.0 %    MCV 87.6 78.0 - 100.0 FL    MCH 27.6 24.0 - 34.0 PG    MCHC 31.5 31.0 - 37.0 g/dL    RDW 15.0 (H) 11.6 - 14.5 %    PLATELET 859 390 - 004 K/uL    MPV 10.6 9.2 - 11.8 FL    NEUTROPHILS 75 (H) 40 - 73 %    LYMPHOCYTES 12 (L) 21 - 52 %    MONOCYTES 6 3 - 10 %    EOSINOPHILS 3 0 - 5 %    BASOPHILS 0 0 - 2 %    ABS. NEUTROPHILS 6.3 1.8 - 8.0 K/UL    ABS. LYMPHOCYTES 1.0 0.9 - 3.6 K/UL    ABS. MONOCYTES 0.5 0.05 - 1.2 K/UL    ABS. EOSINOPHILS 0.3 0.0 - 0.4 K/UL    ABS. BASOPHILS 0.0 0.0 - 0.1 K/UL    DF AUTOMATED     MAGNESIUM    Collection Time: 10/03/21  5:55 AM   Result Value Ref Range    Magnesium 1.7 1.6 - 2.6 mg/dL   METABOLIC PANEL, COMPREHENSIVE    Collection Time: 10/03/21  5:55 AM   Result Value Ref Range    Sodium 143 136 - 145 mmol/L    Potassium 3.6 3.5 - 5.5 mmol/L    Chloride 109 100 - 111 mmol/L    CO2 29 21 - 32 mmol/L    Anion gap 5 3.0 - 18 mmol/L    Glucose 91 74 - 99 mg/dL    BUN 38 (H) 7.0 - 18 MG/DL    Creatinine 1.86 (H) 0.6 - 1.3 MG/DL    BUN/Creatinine ratio 20 12 - 20      GFR est AA 43 (L) >60 ml/min/1.73m2    GFR est non-AA 35 (L) >60 ml/min/1.73m2    Calcium 7.7 (L) 8.5 - 10.1 MG/DL    Bilirubin, total 0.2 0.2 - 1.0 MG/DL    ALT (SGPT) 10 (L) 16 - 61 U/L    AST (SGOT) 10 10 - 38 U/L    Alk.  phosphatase 57 45 - 117 U/L    Protein, total 5.5 (L) 6.4 - 8.2 g/dL    Albumin 1.8 (L) 3.4 - 5.0 g/dL    Globulin 3.7 2.0 - 4.0 g/dL    A-G Ratio 0.5 (L) 0.8 - 1.7     PHOSPHORUS    Collection Time: 10/03/21  5:55 AM   Result Value Ref Range    Phosphorus 3.4 2.5 - 4.9 MG/DL   PTT    Collection Time: 10/03/21  5:55 AM   Result Value Ref Range    aPTT 159.5 (HH) 23.0 - 36.4 SEC       Procedures/imaging: see electronic medical records for all procedures/Xrays and details which were not copied into this note but were reviewed prior to creation of Marina Abel MD   10/3/2021, 9:08 AM

## 2021-10-03 NOTE — PROGRESS NOTES
1925: Assumed patient care from [address]. Patient is alert and oriented to person, place, time and situation. Respiratory status is stable on 2L via nasal cannula. Vital signs are stable. MEWS score is a two. Patient dre any pain, discomfort, nausea vomiting dizziness or anxiety. White board and fall card is updated. Bed is locked and in lowest position. Call bell, water and personal belongings are within reach. Patient has no questions, comments or concerns after bedside shift report. 1591: Lab called this nurse with a critical value, the patient's aPTT was 159.5. His heparin infusion was stopped and will be restarted and decreased by 3 units/kg/hr in one hour by the patient's day shift nurse. 0700: Patient had an uneventful shift. Respiratory status, vital signs and MEWS score remained stable. Patient was resting quietly with no signs of distress noted. Bed locked and in lowest position. Call bell water and personal belongings were within reach. Patient had no questions, comments or concerns after bedside shift report.  Bedside report given to Lizette Yen R.N.

## 2021-10-03 NOTE — PROGRESS NOTES
0730 Bedside and Verbal shift change report given to GREGORIO Zaragoza (oncoming nurse) by Olga Taylor RN (offgoing nurse). Report included the following information SBAR, Kardex, Intake/Output, and MAR. Pt in bed, call light in reach. 1142 Pt assessed. No signs of acute distress. Pt in bed.    1550 Pt assessed. No signs of acute distress. Pt in bed. 1930 Bedside and Verbal shift change report given to Safia Murphy RN (oncoming nurse) by Jean-Pierre Adair RN (offgoing nurse). Report included the following information SBAR, Kardex, Intake/Output, and MAR. Pt in bed, call light in reach.

## 2021-10-03 NOTE — PROGRESS NOTES
OCCUPATIONAL THERAPY EVALUATION    Problem: Self Care Deficits Care Plan (Adult)  Goal: *Acute Goals and Plan of Care (Insert Text)  10/3/2021 1350 by Vishal Wood  Note:   Initial Occupational Therapy Goals (10/3/2021) Within 7 day(s):    1. Patient will perform perform grooming seated EOB for 5 minutes for increased independence in ADLs. 2. Patient will perform UB dressing with min A seated EOB for increased independence with ADLs. 3. Patient will perform LB dressing with min A & A/E PRN for increased independence with ADLs. 4. Patient will perform all aspects of toileting with min A for increased independence with ADLs. 5. Patient will perform LE ADLs utilizing body mechanics & adaptive strategies with 1 verbal cue for increased safety in ADLs. 6. Patient will independently apply energy conservation techniques with  verbal cue(s) for increased independence with ADLs. 10/3/2021 1341 by Vishal Wood  Outcome: Progressing Towards Goal     Patient: Seymour Chris (8 y.o. male)  Date: 10/3/2021  Primary Diagnosis: Febrile illness [R50.9]  COPD with acute exacerbation ([facility] [address] [J44.1]        Precautions:   Fall, Skin, Contact  PLOF: History obtained through chart review, pt lives with spouse in mobile home, previously ambulatory with cane, reports min A with self care tasks such as dressing     ASSESSMENT :  Based on the objective data described below, the patient presents with decreased independence in self care, decreased activity tolerance. Pt received supine in bed, on 2L via NC of O2. Pt O2 levels stay in upper 90s through session. Pt is very hard of hearing, able to instructed in bed mobility however when performing supine to sit, pt notices he is soiled and begins to void bowels. Pt is layed back down to perform bedding change and brief change. Pt does have pressure ulcers along sacrum, we clean out from stool as best as possible and notify nursing assistance about replacing bandage.  Pt needs total A for self care and is anxious about getting cleaned up. Min A for rolling. Pt is fatigued following this process, we leave all needs in reach with [facility] elevated to 30 degrees for comfort. Education: Pt educated of importance in rolling to relieve pressure on sacrum, pt educated on role of OT in acute setting. Patient will benefit from skilled intervention to address the above impairments. Patient's rehabilitation potential is considered to be Fair  Factors which may influence rehabilitation potential include:   []             None noted  []             Mental ability/status  [x]             Medical condition  [x]             Home/family situation and support systems  [x]             Safety awareness  [x]             Pain tolerance/management  []             Other:      PLAN : Pt will be seen by skilled OT daily  Recommendations and Planned Interventions:   [x]               Self Care Training                  [x]      Therapeutic Activities  [x]               Functional Mobility Training   [x]      Cognitive Retraining  [x]               Therapeutic Exercises           [x]      Endurance Activities  [x]               Balance Training                    [x]      Neuromuscular Re-Education  []               Visual/Perceptual Training     [x]      Home Safety Training  [x]               Patient Education                   [x]      Family Training/Education  []               Other (comment):    Frequency/Duration: Patient will be followed by occupational therapy daily to address goals. Discharge Recommendations: Inpatient Rehab  Further Equipment Recommendations for Discharge: N/A     SUBJECTIVE:   Patient stated I am soiled.     OBJECTIVE DATA SUMMARY:     Past Medical History:   Diagnosis Date    Brain aneurysm     Brain aneurysm     Cancer ([facility] [address]     prostate    CHF (congestive heart failure) (HCC)     CKD (chronic kidney disease)     Closed nondisplaced supracondylar fracture of lower end of left femur without intracondylar extension with delayed healing     COPD (chronic obstructive pulmonary disease) ([facility] [address]     Debility     History of home oxygen therapy     [place] (hard of hearing)     Hypertension     Nocturia     On home oxygen therapy     PAF (paroxysmal atrial fibrillation) (HCC)     Pneumonia     Prostate CA ([facility] [address]     Prostate neoplasm     Radiation effect      Past Surgical History:   Procedure Laterality Date    HX HERNIA REPAIR      HX HIP ARTHROSCOPY  04/09/2021     Barriers to Learning/Limitations: None  Compensate with: visual, verbal, tactile, kinesthetic cues/model    Home Situation:   Home Situation  Home Environment: Trailer/mobile home  # Steps to Enter: 6  Rails to Enter: Yes  Hand Rails : Bilateral  Wheelchair Ramp: No  One/Two Story Residence: One story  Living Alone: No  Support Systems: Spouse/Significant Other  Patient Expects to be Discharged to[de-identified] Trailer/mobile home  Current DME Used/Available at Home: Walker, rolling  Tub or Shower Type: Shower  [x]  Right hand dominant   []  Left hand dominant    Cognitive/Behavioral Status:  Neurologic State: Alert; Appropriate for age  Orientation Level: Oriented X4  Cognition: Follows commands  Safety/Judgement: Awareness of environment    Skin: Pt has pressure ulcers along sacrum with protective padding in place. Nursing is notified following brief change of replacement. Edema: noted through B LE    Vision/Perceptual:    Tracking: Able to track stimulus in all quadrants w/o difficulty        Coordination: BUE  Coordination: Generally decreased, functional  Fine Motor Skills-Upper: Left Intact; Right Intact    Gross Motor Skills-Upper: Left Intact; Right Intact    Balance:    Strength: BUE  Strength: Generally decreased, functional    Tone & Sensation: BUE  Tone: Normal  Sensation: Intact    Range of Motion: BUE  AROM: Generally decreased, functional    Functional Mobility and Transfers for ADLs:  Bed Mobility:  Rolling: Minimum assistance        ADL Assessment: Feeding: Setup    Oral Facial Hygiene/Grooming: Contact guard assistance    Bathing: Moderate assistance    Upper Body Dressing: Moderate assistance    Lower Body Dressing: Maximum assistance    Toileting: Total assistance       ADL Intervention:      Toileting  Toileting Assistance: Total assistance(dependent)    Cognitive Retraining  Executive Functions: Managing time;Regulating behavior  Safety/Judgement: Awareness of environment      Pain:  Pain level pre-treatment: 0/10   Pain level post-treatment: 0/10   Pain Intervention(s): Medication provided by Nursing (see MAR); Rest,  Repositioning   Response to intervention: Nurse notified, See doc flow sheet    Activity Tolerance:   Fair. Pt fatgiued following brief change and multiple rolling. Please refer to the flowsheet for vital signs taken during this treatment. After treatment:   [] Patient left in no apparent distress sitting up in chair  [x] Patient left in no apparent distress in bed  [x] Call bell left within reach  [x] Nursing notified  [] Caregiver present  [] Bed alarm activated    COMMUNICATION/EDUCATION:   [x] Role of Occupational Therapy in the acute care setting  [x] Home safety education was provided and the patient/caregiver indicated understanding. [x] Patient/family have participated as able in goal setting and plan of care. [x] Patient/family agree to work toward stated goals and plan of care. [] Patient understands intent and goals of therapy, but is neutral about his/her participation. [] Patient is unable to participate in goal setting and plan of care. Thank you for this referral.  Lowell Valentin OTD, OTR/L   Time Calculation: 25 mins    Eval Complexity: History: MEDIUM Complexity : Expanded review of history including physical, cognitive and psychosocial  history ;    Examination: MEDIUM Complexity : 3-5 performance deficits relating to physical, cognitive , or psychosocial skils that result in activity limitations and / or participation restrictions; Decision Making:MEDIUM Complexity : Patient may present with comorbidities that affect occupational performnce.  Miniml to moderate modification of tasks or assistance (eg, physical or verbal ) with assesment(s) is necessary to enable patient to complete evaluation

## 2021-10-03 NOTE — PROGRESS NOTES
Problem: Falls - Risk of  Goal: *Absence of Falls  Description: Document Aracelis Starch Fall Risk and appropriate interventions in the flowsheet. Outcome: Progressing Towards Goal  Note: Fall Risk Interventions:            Medication Interventions: Patient to call before getting OOB    Elimination Interventions: Call light in reach, Bed/chair exit alarm    History of Falls Interventions: Door open when patient unattended         Problem: Patient Education: Go to Patient Education Activity  Goal: Patient/Family Education  Outcome: Progressing Towards Goal     Problem: Pressure Injury - Risk of  Goal: *Prevention of pressure injury  Description: Document Nikos Scale and appropriate interventions in the flowsheet.   Outcome: Progressing Towards Goal  Note: Pressure Injury Interventions:  Sensory Interventions: Assess changes in LOC    Moisture Interventions: Maintain skin hydration (lotion/cream), Offer toileting Q_hr, Check for incontinence Q2 hours and as needed    Activity Interventions: PT/OT evaluation    Mobility Interventions: HOB 30 degrees or less    Nutrition Interventions: Document food/fluid/supplement intake    Friction and Shear Interventions: HOB 30 degrees or less                Problem: Patient Education: Go to Patient Education Activity  Goal: Patient/Family Education  Outcome: Progressing Towards Goal     Problem: Patient Education: Go to Patient Education Activity  Goal: Patient/Family Education  Outcome: Progressing Towards Goal     Problem: Deep Venous Thrombosis - Risk of  Goal: *Absence of deep venous thrombosis signs and symptoms(Stroke Metric)  Outcome: Progressing Towards Goal  Goal: *Absence of impaired coagulation signs and symptoms  Outcome: Progressing Towards Goal  Goal: *Knowledge of prescribed medications  Outcome: Progressing Towards Goal  Goal: *Absence of bleeding  Outcome: Progressing Towards Goal     Problem: Patient Education: Go to Patient Education Activity  Goal: Patient/Family Education  Outcome: Progressing Towards Goal

## 2021-10-04 LAB
ALBUMIN SERPL-MCNC: 1.8 G/DL (ref 3.4–5)
ALBUMIN/GLOB SERPL: 0.6 {RATIO} (ref 0.8–1.7)
ALP SERPL-CCNC: 61 U/L (ref 45–117)
ALT SERPL-CCNC: 11 U/L (ref 16–61)
ANION GAP SERPL CALC-SCNC: 9 MMOL/L (ref 3–18)
APTT PPP: 87.5 SEC (ref 23–36.4)
AST SERPL-CCNC: 19 U/L (ref 10–38)
BACTERIA SPEC CULT: NORMAL
BASOPHILS # BLD: 0.1 K/UL (ref 0–0.1)
BASOPHILS NFR BLD: 1 % (ref 0–2)
BILIRUB SERPL-MCNC: 0.2 MG/DL (ref 0.2–1)
BUN SERPL-MCNC: 33 MG/DL (ref 7–18)
BUN/CREAT SERPL: 22 (ref 12–20)
CALCIUM SERPL-MCNC: 7.5 MG/DL (ref 8.5–10.1)
CHLORIDE SERPL-SCNC: 109 MMOL/L (ref 100–111)
CO2 SERPL-SCNC: 25 MMOL/L (ref 21–32)
CREAT SERPL-MCNC: 1.49 MG/DL (ref 0.6–1.3)
DIFFERENTIAL METHOD BLD: ABNORMAL
EOSINOPHIL # BLD: 0.8 K/UL (ref 0–0.4)
EOSINOPHIL NFR BLD: 8 % (ref 0–5)
ERYTHROCYTE [DISTWIDTH] IN BLOOD BY AUTOMATED COUNT: 15.2 % (ref 11.6–14.5)
GLOBULIN SER CALC-MCNC: 3.2 G/DL (ref 2–4)
GLUCOSE SERPL-MCNC: 92 MG/DL (ref 74–99)
HCT VFR BLD AUTO: 24.8 % (ref 36–48)
HGB BLD-MCNC: 7.9 G/DL (ref 13–16)
LYMPHOCYTES # BLD: 1.1 K/UL (ref 0.9–3.6)
LYMPHOCYTES NFR BLD: 11 % (ref 21–52)
MCH RBC QN AUTO: 27 PG (ref 24–34)
MCHC RBC AUTO-ENTMCNC: 31.9 G/DL (ref 31–37)
MCV RBC AUTO: 84.6 FL (ref 78–100)
MONOCYTES # BLD: 0.6 K/UL (ref 0.05–1.2)
MONOCYTES NFR BLD: 6 % (ref 3–10)
NEUTS SEG # BLD: 6.3 K/UL (ref 1.8–8)
NEUTS SEG NFR BLD: 67 % (ref 40–73)
PLATELET # BLD AUTO: 229 K/UL (ref 135–420)
PMV BLD AUTO: 10.4 FL (ref 9.2–11.8)
POTASSIUM SERPL-SCNC: 3.5 MMOL/L (ref 3.5–5.5)
PROT SERPL-MCNC: 5 G/DL (ref 6.4–8.2)
RBC # BLD AUTO: 2.93 M/UL (ref 4.35–5.65)
SERVICE CMNT-IMP: NORMAL
SODIUM SERPL-SCNC: 143 MMOL/L (ref 136–145)
WBC # BLD AUTO: 9.4 K/UL (ref 4.6–13.2)

## 2021-10-04 PROCEDURE — 74011250636 HC RX REV CODE- 250/636: Performed by: INTERNAL MEDICINE

## 2021-10-04 PROCEDURE — 74011250637 HC RX REV CODE- 250/637: Performed by: HOSPITALIST

## 2021-10-04 PROCEDURE — 85730 THROMBOPLASTIN TIME PARTIAL: CPT

## 2021-10-04 PROCEDURE — 97110 THERAPEUTIC EXERCISES: CPT

## 2021-10-04 PROCEDURE — 80053 COMPREHEN METABOLIC PANEL: CPT

## 2021-10-04 PROCEDURE — 77030040392 HC DRSG OPTIFOAM MDII -A

## 2021-10-04 PROCEDURE — 85025 COMPLETE CBC W/AUTO DIFF WBC: CPT

## 2021-10-04 PROCEDURE — 74011250637 HC RX REV CODE- 250/637: Performed by: INTERNAL MEDICINE

## 2021-10-04 PROCEDURE — 74011000250 HC RX REV CODE- 250: Performed by: INTERNAL MEDICINE

## 2021-10-04 PROCEDURE — 65270000029 HC RM PRIVATE

## 2021-10-04 PROCEDURE — 36415 COLL VENOUS BLD VENIPUNCTURE: CPT

## 2021-10-04 PROCEDURE — 74011000258 HC RX REV CODE- 258: Performed by: INTERNAL MEDICINE

## 2021-10-04 PROCEDURE — 94640 AIRWAY INHALATION TREATMENT: CPT

## 2021-10-04 PROCEDURE — 77010033678 HC OXYGEN DAILY

## 2021-10-04 PROCEDURE — 97530 THERAPEUTIC ACTIVITIES: CPT

## 2021-10-04 PROCEDURE — 99218 HC RM OBSERVATION: CPT

## 2021-10-04 RX ORDER — AMOXICILLIN AND CLAVULANATE POTASSIUM 500; 125 MG/1; MG/1
1 TABLET, FILM COATED ORAL EVERY 12 HOURS
Status: DISCONTINUED | OUTPATIENT
Start: 2021-10-04 | End: 2021-10-07 | Stop reason: HOSPADM

## 2021-10-04 RX ADMIN — Medication 500 MG: at 09:27

## 2021-10-04 RX ADMIN — ALBUTEROL SULFATE 2 PUFF: 108 AEROSOL, METERED RESPIRATORY (INHALATION) at 00:55

## 2021-10-04 RX ADMIN — DILTIAZEM HYDROCHLORIDE 30 MG: 30 TABLET, FILM COATED ORAL at 16:39

## 2021-10-04 RX ADMIN — ALBUTEROL SULFATE 2 PUFF: 108 AEROSOL, METERED RESPIRATORY (INHALATION) at 14:34

## 2021-10-04 RX ADMIN — Medication 250 MG: at 22:09

## 2021-10-04 RX ADMIN — ARFORMOTEROL TARTRATE 15 MCG: 15 SOLUTION RESPIRATORY (INHALATION) at 07:22

## 2021-10-04 RX ADMIN — COLLAGENASE SANTYL: 250 OINTMENT TOPICAL at 09:33

## 2021-10-04 RX ADMIN — ALBUTEROL SULFATE 2 PUFF: 108 AEROSOL, METERED RESPIRATORY (INHALATION) at 05:21

## 2021-10-04 RX ADMIN — PIPERACILLIN AND TAZOBACTAM 2.25 G: 2; .25 INJECTION, POWDER, LYOPHILIZED, FOR SOLUTION INTRAVENOUS at 02:17

## 2021-10-04 RX ADMIN — FAMOTIDINE 20 MG: 20 TABLET ORAL at 09:27

## 2021-10-04 RX ADMIN — DILTIAZEM HYDROCHLORIDE 30 MG: 30 TABLET, FILM COATED ORAL at 12:47

## 2021-10-04 RX ADMIN — IPRATROPIUM BROMIDE AND ALBUTEROL SULFATE 3 ML: .5; 3 SOLUTION RESPIRATORY (INHALATION) at 18:05

## 2021-10-04 RX ADMIN — BUDESONIDE 500 MCG: 0.5 INHALANT RESPIRATORY (INHALATION) at 20:22

## 2021-10-04 RX ADMIN — AMOXICILLIN AND CLAVULANATE POTASSIUM 1 TABLET: 500; 125 TABLET, FILM COATED ORAL at 22:09

## 2021-10-04 RX ADMIN — Medication 1 TABLET: at 09:26

## 2021-10-04 RX ADMIN — BUDESONIDE 500 MCG: 0.5 INHALANT RESPIRATORY (INHALATION) at 07:22

## 2021-10-04 RX ADMIN — DILTIAZEM HYDROCHLORIDE 30 MG: 30 TABLET, FILM COATED ORAL at 06:42

## 2021-10-04 RX ADMIN — FERROUS SULFATE TAB 325 MG (65 MG ELEMENTAL FE) 325 MG: 325 (65 FE) TAB at 16:39

## 2021-10-04 RX ADMIN — PIPERACILLIN AND TAZOBACTAM 2.25 G: 2; .25 INJECTION, POWDER, LYOPHILIZED, FOR SOLUTION INTRAVENOUS at 09:27

## 2021-10-04 RX ADMIN — Medication 250 MG: at 09:26

## 2021-10-04 RX ADMIN — ALBUTEROL SULFATE 2 PUFF: 108 AEROSOL, METERED RESPIRATORY (INHALATION) at 09:32

## 2021-10-04 RX ADMIN — TAMSULOSIN HYDROCHLORIDE 0.4 MG: 0.4 CAPSULE ORAL at 17:53

## 2021-10-04 RX ADMIN — ARFORMOTEROL TARTRATE 15 MCG: 15 SOLUTION RESPIRATORY (INHALATION) at 20:22

## 2021-10-04 RX ADMIN — FUROSEMIDE 20 MG: 20 TABLET ORAL at 09:26

## 2021-10-04 RX ADMIN — FERROUS SULFATE TAB 325 MG (65 MG ELEMENTAL FE) 325 MG: 325 (65 FE) TAB at 09:27

## 2021-10-04 NOTE — PROGRESS NOTES
DC Plan: SNF    Care manager met with patient who is extremely Ruby; spoke with him about discharge plans - per patient he fell in driveway and was not able to make it into his home when he was discharged from [facility] and was brought back to [facility] ED. Per PT assessment they are still recommending SNF or home with adaptive equipment - however, patient lives in a mobile home with 6 steps to entrance and per discussion with wife, their mobile home is not wheelchair adaptable and as patient is essentially nonambulatory, SNF to possible LTC may be the only safe discharge plan worth pursuing; this has been discussed with patient's wife.

## 2021-10-04 NOTE — PROGRESS NOTES
Hospitalist Progress Note    Patient: Gwendolyn Barakat MRN: [id number]  CSN: [id number]    YOB: 1945  Age: 64 y.o. Sex: male    DOA: 9/30/2021 LOS:  LOS: 0 days                Assessment/Plan     Patient Active Problem List   Diagnosis Code    Hypertension I10    Chronic obstructive pulmonary disease ([facility] [address] J44.9    Chronic renal disease, stage 3, moderately decreased glomerular filtration rate between 30-59 mL/min/1.73 square meter ([facility]) N18.30    Age-related physical debility R54    Chronic respiratory failure with hypoxia ([facility]) J96.11    Tobacco dependence F17.200    Left hip pain M25.552    PAF (paroxysmal atrial fibrillation) ([facility]) I48.0    Avascular necrosis of bone of left hip ([facility]) M87.052    Obesity E66.9    Acute pulmonary edema ([facility]) J81.0    Anemia D64.9    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 02XA    Scrotal edema N50.89    Open wound of left hip S71.002A    Intercondylar fracture of femur ([facility]) S72.413A    Left leg pain M79.605    Open wound of left ankle S91.002A    Infected wound T14. 8XXA, L08.9    Supplemental oxygen dependent Z99.81    MRSA (methicillin resistant staph aureus) culture positive Z22.322    Pyogenic inflammation of bone ([facility]) M86.9    Osteomyelitis of left ankle ([facility]) M86.9    Cellulitis of left ankle L03. 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 male with PMHX hypertension, COPD, CRD, prostate cancer, brain aneurysm, on home O2  due to chronic respiratory failure and medical noncompliance. fracture of left ankle. Multiple admissions to this hospital.  Presents to ER due to falls at home and fever.     Passing clots in urine,     LE cellulitis -  Continue with antibiotics    Recent right ankle open wound, cultures growing MRSA on chronic doxycycline. ID and podiatry consulted    Right leg DVT -  Hold heparin drip due to hematuria    Hematuria -  History of prostate ca  Hold heparin drip. Consult urology    hypertension -  continue home medication     Chronic COPD with chronic respiratory failure and oxygen supplementation-  Resume home medication  DuoNebs as needed  Oxygen supplementation     Chronic renal disease stage III -  Avoid nephrotoxins  Creatinine at baseline  Normal electrolytes     Paroxysmal atrial fibrillation -  No acute issues  Resume home medication     Anemia -  Chronic, monitor H/H    Recent Intercondylar fracture of left distal femur -   Conservative management recommended.     GI prophylaxis with pepcid. Prognosis poor with multiple hospital admissions. Disposition : TBD    Review of systems  General: No fevers or chills. Cardiovascular: No chest pain or pressure. No palpitations. Pulmonary: No shortness of breath. Gastrointestinal: No nausea, vomiting. Physical Exam:  General: Awake, cooperative, no acute distress    HEENT: NC, Atraumatic. PERRLA, anicteric sclerae. Lungs: CTA Bilaterally. No Wheezing/Rhonchi/Rales. Heart:  S1 S2,  No murmur, No Rubs, No Gallops  Abdomen: Soft, Non distended, Non tender.  +Bowel sounds,   Extremities:   Psych:   Not anxious or agitated. Neurologic:  No acute neurological deficit. Vital signs/Intake and Output:  Visit Vitals  /60   Pulse 81   Temp 97.9 °F (36.6 °C)   Resp 22   Ht 5' 9\" (1.753 m)   Wt 84.2 kg (185 lb 10 oz)   SpO2 99%   BMI 27.41 kg/m²     Current Shift:  No intake/output data recorded.   Last three shifts:  10/02 1901 - 10/04 0700  In: 1080 [P.O.:1080]  Out: 1000 [Urine:1000]            Labs: Results:       Chemistry Recent Labs     10/04/21  0520 10/03/21  0555 10/02/21  0325   GLU 92 91  --     143  --    K 3.5 3.6  --     109  --    CO2 25 29  -- BUN 33* 38*  --    CREA 1.49* 1.86*  --    CA 7.5* 7.7*  --    AGAP 9 5  --    BUCR 22* 20  --    AP 61 57 59   TP 5.0* 5.5* 5.0*   ALB 1.8* 1.8* 1.7*   GLOB 3.2 3.7 3.3   AGRAT 0.6* 0.5* 0.5*      CBC w/Diff Recent Labs     10/04/21  0520 10/03/21  0555 10/02/21  0325   WBC 9.4 8.4 8.8   RBC 2.93* 2.83* 2.72*   HGB 7.9* 7.8* 7.4*   HCT 24.8* 24.8* 22.7*    193 173   GRANS 67 75* 87*   LYMPH 11* 12* 8*   EOS 8* 3 0      Cardiac Enzymes No results for input(s): CPK, CKND1, WILLARD in the last 72 hours. No lab exists for component: CKRMB, TROIP   Coagulation Recent Labs     10/04/21  0520 10/03/21  1414   APTT 87.5* 76.3*       Lipid Panel No results found for: CHOL, CHOLPOCT, CHOLX, CHLST, CHOLV, 695410, HDL, HDLP, LDL, LDLC, DLDLP, 175566, VLDLC, VLDL, TGLX, TRIGL, TRIGP, TGLPOCT, CHHD, CHHDX   BNP No results for input(s): BNPP in the last 72 hours.    Liver Enzymes Recent Labs     10/04/21  0520   TP 5.0*   ALB 1.8*   AP 61      Thyroid Studies Lab Results   Component Value Date/Time    TSH 3.06 04/05/2021 01:30 PM        Procedures/imaging: see electronic medical records for all procedures/Xrays and details which were not copied into this note but were reviewed prior to creation of Plan

## 2021-10-04 NOTE — PROGRESS NOTES
Problem: Mobility Impaired (Adult and Pediatric)  Goal: *Acute Goals and Plan of Care (Insert Text)  Outcome: Progressing Towards Goal   PHYSICAL THERAPY TREATMENT    Patient: Yann Stewart (85 y.o. male)  Date: 10/4/2021  Diagnosis: Febrile illness [R50.9]  COPD with acute exacerbation (HCC) [J44.1] Febrile illness       Precautions: Fall, Skin, Contact  PLOF: ambulating several steps with RW, recent d/c from SNF    ASSESSMENT:  Pt is pleasant and motivated to participate with PT. Pt transferred supine to sit with min A and demonstrated good static sitting balance. Pt transferred sit to stand 3 times with min A. Pt sidestepped 3 feet along the edge of the bed with RW and min A. Pt declined ambulating away from the bed due to fear of falling but stated that he would like to try it during the next session. Pt returned to supine in bed with min A and was left with needs in reach. Progression toward goals:   []      Improving appropriately and progressing toward goals  [x]      Improving slowly and progressing toward goals  []      Not making progress toward goals and plan of care will be adjusted     PLAN:  Patient continues to benefit from skilled intervention to address the above impairments. Continue treatment per established plan of care. Discharge Recommendations:  Skilled Nursing Facility  Further Equipment Recommendations for Discharge:  N/A     SUBJECTIVE:   Patient stated I need to get stronger.     OBJECTIVE DATA SUMMARY:   Critical Behavior:  Neurologic State: Alert, Appropriate for age  Orientation Level: Oriented X4  Cognition: Follows commands  Safety/Judgement: Awareness of environment  Functional Mobility Training:  Bed Mobility:  Rolling: Supervision  Supine to Sit: Minimum assistance  Sit to Supine: Minimum assistance  Transfers:  Sit to Stand: Minimum assistance  Stand to Sit: Minimum assistance        Balance:  Sitting - Static: Good (unsupported)  Standing: With support  Standing - Static: Fair  Standing - Dynamic :  (fair-)  Ambulation/Gait Training:  Distance (ft): 3 Feet (ft) (sidestepping along the edge of the bed)  Assistive Device: Walker, rolling  Ambulation - Level of Assistance: Minimal assistance  Gait Abnormalities: Decreased step clearance  Therapeutic Exercises:         EXERCISE   Sets   Reps   Active Active Assist   Passive Self ROM   Comments   Ankle Pumps 1 10  [x] [] [] []    Quad Sets/Glut Sets    [] [] [] [] Hold for 5 secs   Hamstring Sets   [] [] [] []    Short Arc Quads   [] [] [] []    Heel Slides 1 10 [] [x] [] []    Straight Leg Raises   [] [] [] []    Hip Abd/Add 1 10 [] [x] [] []    Long Arc Quads 1 10 [x] [] [] []    Seated Marching 1 5 [x] [] [] []    Standing Marching   [] [] [] []       [] [] [] []        Pain:  Pain level pre-treatment: 2/10  Pain level post-treatment: 3/10   Pain Intervention(s): Rest, Repositioning   Response to intervention: Nurse notified, See doc flow    Activity Tolerance:   Poor+  Please refer to the flowsheet for vital signs taken during this treatment. After treatment:   [] Patient left in no apparent distress sitting up in chair  [x] Patient left in no apparent distress in bed  [x] Call bell left within reach  [x] Nursing notified  [] Caregiver present  [] Bed alarm activated  [] SCDs applied      COMMUNICATION/EDUCATION:   [x]         Role of Physical Therapy in the acute care setting. [x]         Fall prevention education was provided and the patient/caregiver indicated understanding. [x]         Patient/family have participated as able in working toward goals and plan of care. [x]         Patient/family agree to work toward stated goals and plan of care. []         Patient understands intent and goals of therapy, but is neutral about his/her participation.   []         Patient is unable to participate in stated goals/plan of care: ongoing with therapy staff.  []         Other:        Socrates Bermudez, PT   Time Calculation: 24 mins

## 2021-10-04 NOTE — PROGRESS NOTES
Problem: Falls - Risk of  Goal: *Absence of Falls  Description: Document Ericka Gary Fall Risk and appropriate interventions in the flowsheet. Outcome: Progressing Towards Goal  Note: Fall Risk Interventions:            Medication Interventions: Patient to call before getting OOB, Teach patient to arise slowly    Elimination Interventions: Call light in reach, Patient to call for help with toileting needs    History of Falls Interventions: Door open when patient unattended, Room close to nurse's station         Problem: Patient Education: Go to Patient Education Activity  Goal: Patient/Family Education  Outcome: Progressing Towards Goal     Problem: Pressure Injury - Risk of  Goal: *Prevention of pressure injury  Description: Document Nikos Scale and appropriate interventions in the flowsheet.   Outcome: Progressing Towards Goal  Note: Pressure Injury Interventions:  Sensory Interventions: Assess changes in LOC, Minimize linen layers, Pressure redistribution bed/mattress (bed type)    Moisture Interventions: Absorbent underpads, Minimize layers    Activity Interventions: PT/OT evaluation, Pressure redistribution bed/mattress(bed type)    Mobility Interventions: PT/OT evaluation, Pressure redistribution bed/mattress (bed type)    Nutrition Interventions: Document food/fluid/supplement intake    Friction and Shear Interventions: Minimize layers, Apply protective barrier, creams and emollients                Problem: Patient Education: Go to Patient Education Activity  Goal: Patient/Family Education  Outcome: Progressing Towards Goal     Problem: Deep Venous Thrombosis - Risk of  Goal: *Absence of deep venous thrombosis signs and symptoms(Stroke Metric)  Outcome: Progressing Towards Goal  Goal: *Absence of impaired coagulation signs and symptoms  Outcome: Progressing Towards Goal  Goal: *Knowledge of prescribed medications  Outcome: Progressing Towards Goal  Goal: *Absence of bleeding  Outcome: Progressing Towards Goal Problem: Patient Education: Go to Patient Education Activity  Goal: Patient/Family Education  Outcome: Progressing Towards Goal

## 2021-10-04 NOTE — PROGRESS NOTES
Problem: Falls - Risk of  Goal: *Absence of Falls  Description: Document Clemente Ramirez Fall Risk and appropriate interventions in the flowsheet.   Outcome: Progressing Towards Goal  Note: Fall Risk Interventions:            Medication Interventions: Patient to call before getting OOB    Elimination Interventions: Patient to call for help with toileting needs, Toilet paper/wipes in reach    History of Falls Interventions: Door open when patient unattended

## 2021-10-04 NOTE — PROGRESS NOTES
0755:Received verbal bedside report from off going nurse GREGORIO De Luna R.N. Patient care received. Patient alert and oriented x 4. Patient resting in bed denies pain. Patient stable. Call light with in reach bed in lowest position. 1449:Informed  that patient had blood tinged urine that contained a large clot.  gave orders to stop drip. Will continue to monitor patient. 1625: called and gave orders to put in a 20 [place] dewitt 3- way dewitt and continue to hold heparin. 1905: Informed  that patient's urine was yellow with red flecks when catheter was inserted. Melvin Ayers gave orders that patient can be restarted on heparin drip as long urine is not \"cherry\" red.    1925: Informed  that Melvin Ayers said that patient can be restarted on heparin drip. He gave verbal orders to restart heparin drip.  Will continue to monitor patient,

## 2021-10-04 NOTE — ROUTINE PROCESS
Patient received inhaler, started felling \"a little better. \" Readjust in bed, continue to moan. Resting in bed.

## 2021-10-04 NOTE — PROGRESS NOTES
[facility]  (A Division of [address] [place] [address])                                                                                                                       Lillian Rowland MD  Office #:     -[id number] #[phone]   Office Fax: [phone]     Date of Admission: 9/30/2021Date of Note: 10/4/2021  Reason for FU: Evaluation and antibiotic management of fever      Current Antimicrobials:    Prior Antimicrobials:  Zosyn 9/30 to date     Doxycycline IV 9/30 to 10/1       Assessment- ID related:  --------------------------------------------------------------------------  · Possible sepsis. Fever with mild leucocytosis--Not sure what the source is. Possible UTI?has hematuria. Doubt cellulitis- graft site- L ankle looks ok, skin changes noted. Doubt OM with normal x-ray- no mention of OM with chronic wound in place. CXR with chronic changes as well. · URIEL- Cr bump to 1.64-- due to sepsis Vs other causes   Covid rapid test negative  BCX 9/30- NGSF  UCX 10/2: NGSF( ABX)  WCX- 10/1-mod dipheroids  History of MRSA pna- 4/2021  Wound infection 8/2021-- treated with doxy/cipro  X10 days from 8/23/21    Other Medical Issues- Mx per respective team:  Anemia- 7.9  L fem fracture-hx  COPD O2 dependent  Hx of brain aneurism  L femur # 07/2021-> OM left tibia  Diverticulosis without diverticulitis   Recommendation for ID issues I am following:  -----------------------------------------------------------------------------    Switch over to oral Augmeint 500/125 BID  Can give for 5-7 days more       Subjective:  \" feel all right\"  Very hard of hearing, voices no complaint    Afebrile- 97.6, WBC 9.4K. 99% on 2 L, not tachcyardic  UA with many rbc 10/2. Cr down to 1.49  Denies new issue - urine clearing up  CT AP didn't show acute abd process  Nuc med imaging didn't show OM  UA had hematuria        HPI:  Kana Lyon is a 64 y.o.  WHITE/NON- with PMH as listed below, known to me from admission in August 2021. He is very hard of hearing, and difficult to piece together the history from him alone. Reviewed chart in detail and DW with Podiatyr/nursing. Seems like he was DC'ed from SNF to home recently, and he had a fall when EMS called. Temp was high to 103.1 by EMS ED chart stated. He doesn't recall high fevers/chills/cough/SOB/abd pain- but does have discomfort when passing urine. Doesn't recall for how  Long. Denies increasing pain on left hip-prior fracture site. In ED, temp 100.7, WBC 12K, 100% on 2L, BP in ED 97/50. CPK is ok. Cr is bumped up to 1.6. CXR with no acute changes. X-ray of foot and ankle L- no mention of OM/fracture. He had graft by Podiatry and healing taking place nicely. Found to have acute dVT LE. Currently on IV doxycycline and Zosyn. Not clear when he tool oral doxy last.    He was in Ed for Regeneron infusion on 9/10/21.          Active Hospital Problems    Diagnosis Date Noted    DVT (deep venous thrombosis) ([address] 10/01/2021    COPD with acute exacerbation ([address] 09/30/2021    Febrile illness 09/30/2021     Past Medical History:   Diagnosis Date    Brain aneurysm     Brain aneurysm     Cancer ([address]     prostate    CHF (congestive heart failure) (HCC)     CKD (chronic kidney disease)     Closed nondisplaced supracondylar fracture of lower end of left femur without intracondylar extension with delayed healing     COPD (chronic obstructive pulmonary disease) ([address]     Debility     History of home oxygen therapy     [place] (hard of hearing)     Hypertension     Nocturia     On home oxygen therapy     PAF (paroxysmal atrial fibrillation) (HCC)     Pneumonia     Prostate CA ([address]     Prostate neoplasm     Radiation effect      Past Surgical History:   Procedure Laterality Date    HX HERNIA REPAIR      HX HIP ARTHROSCOPY  04/09/2021     Family History   Problem Relation Age of Onset    Heart Disease Mother      Social History Socioeconomic History    Marital status:      Spouse name: Not on file    Number of children: Not on file    Years of education: Not on file    Highest education level: Not on file   Occupational History    Not on file   Tobacco Use    Smoking status: Former Smoker     Types: Cigarettes    Smokeless tobacco: Never Used   Substance and Sexual Activity    Alcohol use: No    Drug use: Never    Sexual activity: Not on file   Other Topics Concern    Not on file   Social History Narrative    Not on file     Social Determinants of Health     Financial Resource Strain:     Difficulty of Paying Living Expenses:    Food Insecurity:     Worried About Running Out of Food in the Last Year:     [address] in the Last Year:    Transportation Needs:     Lack of Transportation (Medical):  Lack of Transportation (Non-Medical):    Physical Activity:     Days of Exercise per Week:     Minutes of Exercise per Session:    Stress:     Feeling of Stress :    Social Connections:     Frequency of Communication with Friends and Family:     Frequency of Social Gatherings with Friends and Family:     Attends Holiness Services:     Active Member of Clubs or Organizations:     Attends Club or Organization Meetings:     Marital Status:    Intimate Partner Violence:     Fear of Current or Ex-Partner:     Emotionally Abused:     Physically Abused:     Sexually Abused:         Allergies:  Aspirin and Bactrim [sulfamethoxazole-trimethoprim]     Medications:  Current Facility-Administered Medications   Medication Dose Route Frequency    piperacillin-tazobactam (ZOSYN) 2.25 g in 0.9% sodium chloride (MBP/ADV) 50 mL MBP  2.25 g IntraVENous Q6H    polyethylene glycol (MIRALAX) packet 17 g  17 g Oral DAILY    albuterol (PROVENTIL HFA, VENTOLIN HFA, PROAIR HFA) inhaler 2 Puff  2 Puff Inhalation Q4H PRN    albuterol-ipratropium (DUO-NEB) 2.5 MG-0.5 MG/3 ML  3 mL Nebulization Q4H PRN    collagenase (SANTYL) 250 unit/gram ointment   Topical DAILY    dilTIAZem IR (CARDIZEM) tablet 30 mg  30 mg Oral TIDAC    famotidine (PEPCID) tablet 20 mg  20 mg Oral DAILY    ferrous sulfate tablet 325 mg  325 mg Oral BID WITH MEALS    furosemide (LASIX) tablet 20 mg  20 mg Oral DAILY    tamsulosin (FLOMAX) capsule 0.4 mg  0.4 mg Oral QPM    acetaminophen (TYLENOL) tablet 650 mg  650 mg Oral Q6H PRN    Or    acetaminophen (TYLENOL) suppository 650 mg  650 mg Rectal Q6H PRN    polyethylene glycol (MIRALAX) packet 17 g  17 g Oral DAILY PRN    ondansetron (ZOFRAN ODT) tablet 4 mg  4 mg Oral Q8H PRN    Or    ondansetron (ZOFRAN) injection 4 mg  4 mg IntraVENous Q6H PRN    arformoteroL (BROVANA) neb solution 15 mcg  15 mcg Nebulization BID RT    budesonide (PULMICORT) 500 mcg/2 ml nebulizer suspension  500 mcg Nebulization BID RT    multivitamin, tx-iron-ca-min (THERA-M w/ IRON) tablet 1 Tablet  1 Tablet Oral DAILY    ascorbic acid (vitamin C) (VITAMIN C) tablet 500 mg  500 mg Oral DAILY    Saccharomyces boulardii (FLORASTOR) capsule 250 mg  250 mg Oral BID    heparin 25,000 units in D5W 250 ml infusion  18-36 Units/kg/hr IntraVENous TITRATE        ROS:  Pertinent items are noted in the History of Present Illness. Physical Exam:    Temp (24hrs), Av.7 °F (36.5 °C), Min:97.3 °F (36.3 °C), Max:98 °F (36.7 °C)    Visit Vitals  /60   Pulse 81   Temp 97.9 °F (36.6 °C)   Resp 22   Ht 5' 9\" (1.753 m)   Wt 84.2 kg (185 lb 10 oz)   SpO2 99%   BMI 27.41 kg/m²          GEN: WD frail, chronically ill looking,  not in resp distress. HEENT: Unicteric. EOMI intact  CHEST: Non laboured breathing. CTA  CVS:RRR, no mur/gallop  ABD: Obese/soft. Non tender. AMANDA: Condom catheter removed- dark urine in bag  EXT: No apparent swelling or redness on UE/LE joints. Left LE- dressed, pictures reviweed with podiatry  Skin: Dry and intact. No rash, no redness. CNS: A, OX3. Moves all extremity. CN grossly ok.  Except hard of hearing      Microbiology  All Micro Results     Procedure Component Value Units Date/Time    CULTURE, URINE [[id number]] Collected: 10/02/21 0618    Order Status: Completed Specimen: Cath Urine Updated: 10/04/21 0811     Special Requests: NO SPECIAL REQUESTS        Culture result: No growth (<1,000 CFU/ML)       CULTURE, BLOOD [[id number]] Collected: 09/30/21 2052    Order Status: Completed Specimen: Blood Updated: 10/04/21 0726     Special Requests: NO SPECIAL REQUESTS        Culture result: NO GROWTH 4 DAYS       CULTURE, BLOOD [[id number]] Collected: 09/30/21 2032    Order Status: Completed Specimen: Blood Updated: 10/04/21 0726     Special Requests: NO SPECIAL REQUESTS        Culture result: NO GROWTH 4 DAYS       CULTURE, Tang Merl STAIN [[id number]]  (Abnormal) Collected: 10/01/21 1115    Order Status: Completed Specimen: Wound Drainage Updated: 10/03/21 1255     Special Requests: NO SPECIAL REQUESTS        GRAM STAIN FEW WBCS SEEN         NO ORGANISMS SEEN        Culture result:       MODERATE DIPHTHEROIDS , SO FAR          INFLUENZA A & B AG (RAPID TEST) [[id number]] Collected: 09/30/21 2052    Order Status: Completed Specimen: Nasopharyngeal from Nasal washing Updated: 09/30/21 2124     Influenza A Antigen Negative        Comment: A negative result does not exclude influenza virus infection, seasonal or H1N1 due to suboptimal sensitivity. If influenza is circulating in your community, a diagnosis of influenza should be considered based on a patients clinical presentation and empiric antiviral treatment should be considered, if indicated.         Influenza B Antigen Negative       COVID-19 RAPID TEST [[id number]] Collected: 09/30/21 2052    Order Status: Completed Specimen: Nasopharyngeal Updated: 09/30/21 2122     Specimen source Nasopharyngeal        COVID-19 rapid test Not detected        Comment: Rapid Abbott ID Now       Rapid NAAT:  The specimen is NEGATIVE for SARS-CoV-2, the novel coronavirus associated with COVID-19. Negative results should be treated as presumptive and, if inconsistent with clinical signs and symptoms or necessary for patient management, should be tested with an alternative molecular assay. Negative results do not preclude SARS-CoV-2 infection and should not be used as the sole basis for patient management decisions. This test has been authorized by the FDA under an Emergency Use Authorization (EUA) for use by authorized laboratories. Fact sheet for Healthcare Providers: OPTIMIZERx.co.nz  Fact sheet for Patients: OPTIMIZERx.co.nz       Methodology: Isothermal Nucleic Acid Amplification                    Lines / Catheters:  Lab results:    Chemistry  Recent Labs     10/04/21  0520 10/03/21  0555 10/02/21  0325   GLU 92 91  --     143  --    K 3.5 3.6  --     109  --    CO2 25 29  --    BUN 33* 38*  --    CREA 1.49* 1.86*  --    CA 7.5* 7.7*  --    AGAP 9 5  --    BUCR 22* 20  --    AP 61 57 59   TP 5.0* 5.5* 5.0*   ALB 1.8* 1.8* 1.7*   GLOB 3.2 3.7 3.3   AGRAT 0.6* 0.5* 0.5*       CBC w/ Diff  Recent Labs     10/04/21  0520 10/03/21  0555 10/02/21  0325   WBC 9.4 8.4 8.8   RBC 2.93* 2.83* 2.72*   HGB 7.9* 7.8* 7.4*   HCT 24.8* 24.8* 22.7*    193 173   GRANS 67 75* 87*   LYMPH 11* 12* 8*   EOS 8* 3 0       Imaging: report posted below as per radiologist     CT A/P- 10/1     Moderate amount of stool in the rectum with perirectal stranding suggesting  stercoral colitis.     2.7 x 1.6 x 3.1 centimeter filling defect in the urinary bladder with  differential diagnoses including blood clot versus bladder mass. Advise urology  consultation.     Colonic diverticulosis without diverticular colitis.     New sclerotic lesion at the S1 level raising the concern for osteoblastic  metastasis versus healing fracture. Advise MRI for further evaluation. nuc med: 10/1  1.   Mild persistent but improved hyperemia and soft tissue uptake without  definite osseous uptake to suggest osteoarthritis.

## 2021-10-04 NOTE — ROUTINE PROCESS
Patient c/o having difficulty breathing, requesting for a breathing tx. Resp called. Patient noted to have O2 NC tubing around neck, not in place. Tubing reaply to nasal cavity and encouraged to keep it in plac. O2 2L sat 98%. Patient sitting at side of bed, moaning with increased breathing. No s/s cyanosis at this time. PRN Inhaler ordered from pharmacy.

## 2021-10-05 PROBLEM — J44.1 COPD EXACERBATION (HCC): Status: ACTIVE | Noted: 2021-10-05

## 2021-10-05 LAB
APTT PPP: 93 SEC (ref 23–36.4)
BACTERIA SPEC CULT: ABNORMAL
BACTERIA SPEC CULT: ABNORMAL
GRAM STN SPEC: ABNORMAL
GRAM STN SPEC: ABNORMAL
SERVICE CMNT-IMP: ABNORMAL

## 2021-10-05 PROCEDURE — 65270000029 HC RM PRIVATE

## 2021-10-05 PROCEDURE — 97530 THERAPEUTIC ACTIVITIES: CPT

## 2021-10-05 PROCEDURE — 74011250637 HC RX REV CODE- 250/637: Performed by: HOSPITALIST

## 2021-10-05 PROCEDURE — 94640 AIRWAY INHALATION TREATMENT: CPT

## 2021-10-05 PROCEDURE — 74011250637 HC RX REV CODE- 250/637: Performed by: INTERNAL MEDICINE

## 2021-10-05 PROCEDURE — 85730 THROMBOPLASTIN TIME PARTIAL: CPT

## 2021-10-05 PROCEDURE — 74011000250 HC RX REV CODE- 250: Performed by: INTERNAL MEDICINE

## 2021-10-05 PROCEDURE — 99218 HC RM OBSERVATION: CPT

## 2021-10-05 PROCEDURE — 74011250636 HC RX REV CODE- 250/636: Performed by: INTERNAL MEDICINE

## 2021-10-05 PROCEDURE — 97535 SELF CARE MNGMENT TRAINING: CPT

## 2021-10-05 PROCEDURE — 77010033678 HC OXYGEN DAILY

## 2021-10-05 PROCEDURE — 36415 COLL VENOUS BLD VENIPUNCTURE: CPT

## 2021-10-05 RX ORDER — LINEZOLID 600 MG/1
600 TABLET, FILM COATED ORAL EVERY 12 HOURS
Status: DISCONTINUED | OUTPATIENT
Start: 2021-10-05 | End: 2021-10-07 | Stop reason: HOSPADM

## 2021-10-05 RX ADMIN — TAMSULOSIN HYDROCHLORIDE 0.4 MG: 0.4 CAPSULE ORAL at 18:37

## 2021-10-05 RX ADMIN — DILTIAZEM HYDROCHLORIDE 30 MG: 30 TABLET, FILM COATED ORAL at 16:05

## 2021-10-05 RX ADMIN — FAMOTIDINE 20 MG: 20 TABLET ORAL at 09:17

## 2021-10-05 RX ADMIN — IPRATROPIUM BROMIDE AND ALBUTEROL SULFATE 3 ML: .5; 3 SOLUTION RESPIRATORY (INHALATION) at 11:56

## 2021-10-05 RX ADMIN — AMOXICILLIN AND CLAVULANATE POTASSIUM 1 TABLET: 500; 125 TABLET, FILM COATED ORAL at 09:16

## 2021-10-05 RX ADMIN — FUROSEMIDE 20 MG: 20 TABLET ORAL at 09:18

## 2021-10-05 RX ADMIN — COLLAGENASE SANTYL: 250 OINTMENT TOPICAL at 12:49

## 2021-10-05 RX ADMIN — ARFORMOTEROL TARTRATE 15 MCG: 15 SOLUTION RESPIRATORY (INHALATION) at 07:24

## 2021-10-05 RX ADMIN — DILTIAZEM HYDROCHLORIDE 30 MG: 30 TABLET, FILM COATED ORAL at 06:21

## 2021-10-05 RX ADMIN — AMOXICILLIN AND CLAVULANATE POTASSIUM 1 TABLET: 500; 125 TABLET, FILM COATED ORAL at 22:42

## 2021-10-05 RX ADMIN — LINEZOLID 600 MG: 600 TABLET, FILM COATED ORAL at 22:42

## 2021-10-05 RX ADMIN — Medication 1 TABLET: at 09:19

## 2021-10-05 RX ADMIN — BUDESONIDE 500 MCG: 0.5 INHALANT RESPIRATORY (INHALATION) at 20:54

## 2021-10-05 RX ADMIN — IPRATROPIUM BROMIDE AND ALBUTEROL SULFATE 3 ML: .5; 3 SOLUTION RESPIRATORY (INHALATION) at 15:04

## 2021-10-05 RX ADMIN — IPRATROPIUM BROMIDE AND ALBUTEROL SULFATE 3 ML: .5; 3 SOLUTION RESPIRATORY (INHALATION) at 20:54

## 2021-10-05 RX ADMIN — Medication 250 MG: at 22:42

## 2021-10-05 RX ADMIN — FERROUS SULFATE TAB 325 MG (65 MG ELEMENTAL FE) 325 MG: 325 (65 FE) TAB at 16:05

## 2021-10-05 RX ADMIN — IPRATROPIUM BROMIDE AND ALBUTEROL SULFATE 3 ML: .5; 3 SOLUTION RESPIRATORY (INHALATION) at 03:31

## 2021-10-05 RX ADMIN — FERROUS SULFATE TAB 325 MG (65 MG ELEMENTAL FE) 325 MG: 325 (65 FE) TAB at 09:18

## 2021-10-05 RX ADMIN — DILTIAZEM HYDROCHLORIDE 30 MG: 30 TABLET, FILM COATED ORAL at 12:49

## 2021-10-05 RX ADMIN — LINEZOLID 600 MG: 600 TABLET, FILM COATED ORAL at 16:04

## 2021-10-05 RX ADMIN — IPRATROPIUM BROMIDE AND ALBUTEROL SULFATE 3 ML: .5; 3 SOLUTION RESPIRATORY (INHALATION) at 07:24

## 2021-10-05 RX ADMIN — Medication 500 MG: at 09:17

## 2021-10-05 RX ADMIN — Medication 250 MG: at 09:16

## 2021-10-05 RX ADMIN — ARFORMOTEROL TARTRATE 15 MCG: 15 SOLUTION RESPIRATORY (INHALATION) at 20:54

## 2021-10-05 RX ADMIN — BUDESONIDE 500 MCG: 0.5 INHALANT RESPIRATORY (INHALATION) at 07:25

## 2021-10-05 RX ADMIN — HEPARIN SODIUM 15 UNITS/KG/HR: 10000 INJECTION, SOLUTION INTRAVENOUS at 07:49

## 2021-10-05 NOTE — PROGRESS NOTES
Problem: Mobility Impaired (Adult and Pediatric)  Goal: *Acute Goals and Plan of Care (Insert Text)  Description: Initiated 10/2/2021 and to be accomplished within 7 day(s)  1. Patient will move from supine to sit and sit to supine  in bed with supervision/set-up. 2.  Patient will transfer from bed to chair and chair to bed with supervision/set-up using the least restrictive device. 3.  Patient will perform sit to stand with supervision/set-up. 4.  Patient will ambulate with supervision/set-up for 50 feet with the least restrictive device. 5.  Patient will ascend/descend 6 stairs with B handrail(s) with minimal assistance/contact guard assist.      Prior Level of Function:   Patient was recently at [facility] (Paulino Kale) and per chart review, pt working with therapists at [facility] - pt only able to take 5 steps w/ RW, mod A. Pt performs STS with min A. Patient lives with wife in a mobile home with 6 [address], B railing. Pt reports he sits on a ledge in the shower to bathe. Pt is extremely [place] -bilateral.       Outcome: Progressing Towards Goal   PHYSICAL THERAPY TREATMENT    Patient: Zehra Martins (52 y.o. male)  Date: 10/5/2021  Diagnosis: Febrile illness [R50.9]  COPD with acute exacerbation (HCC) [J44.1] Febrile illness       Precautions: Fall, Skin, Contact  PLOF: see above    ASSESSMENT:  Pt is cooperative and motivated to participate in PT session. Prior to initiating mobility patient's O2 sat was 98% and HR 93bpm.  Pt performed ankle pumps and heel slides x10 and heart rate increased to 120bpm and it decreased appropriately with rest.  Pt transferred supine to sit with SBA and additional time. When sitting patient's heart rate increased into the 130's and jumped between 100-130 and then started reading 40s-50s on the portable vitals machine. Pt was returned to supine and heart rate was back in the 90s prior to leaving his room.   Spoke with cardiac monitor tech who verbalized that heart rate was in the 130s but did not go into the 40s on their monitor so may have been a misreading on the vitals machine. Progression toward goals:   []      Improving appropriately and progressing toward goals  [x]      Improving slowly and progressing toward goals  []      Not making progress toward goals and plan of care will be adjusted     PLAN:  Patient continues to benefit from skilled intervention to address the above impairments. Continue treatment per established plan of care. Discharge Recommendations:  Skilled Nursing Facility  Further Equipment Recommendations for Discharge:  N/A     SUBJECTIVE:   Patient stated I'm sorry I couldn't do better today.     OBJECTIVE DATA SUMMARY:   Critical Behavior:  Neurologic State: Alert  Orientation Level: Oriented X4  Cognition: Appropriate for age attention/concentration, Appropriate safety awareness, Follows commands  Safety/Judgement: Awareness of environment  Functional Mobility Training:  Bed Mobility:  Rolling: Supervision  Supine to Sit: Stand-by assistance; Additional time  Sit to Supine: Minimum assistance     Balance:  Sitting - Static: Good (unsupported)      Therapeutic Exercises:         EXERCISE   Sets   Reps   Active Active Assist   Passive Self ROM   Comments   Ankle Pumps 1 10  [x] [] [] []    Quad Sets/Glut Sets    [] [] [] [] Hold for 5 secs   Hamstring Sets   [] [] [] []    Short Arc Quads   [] [] [] []    Heel Slides 1 10 [x] [] [] []    Straight Leg Raises   [] [] [] []    Hip Add   [] [] [] [] Hold for 5 secs, w/ pillow squeeze   Long Arc Quads   [] [] [] []    Seated Marching   [] [] [] []    Standing Marching   [] [] [] []       [] [] [] []        Pain:  Pain level pre-treatment: 0/10  Pain level post-treatment: 0/10   Pain Intervention(s): n/a    Activity Tolerance:   Poor+    Please refer to the flowsheet for vital signs taken during this treatment.   After treatment:   [] Patient left in no apparent distress sitting up in chair  [x] Patient left in no apparent distress in bed  [x] Call bell left within reach  [x] Nursing notified  [] Caregiver present  [] Bed alarm activated  [] SCDs applied      COMMUNICATION/EDUCATION:   [x]         Role of Physical Therapy in the acute care setting. [x]         Fall prevention education was provided and the patient/caregiver indicated understanding. [x]         Patient/family have participated as able in working toward goals and plan of care. [x]         Patient/family agree to work toward stated goals and plan of care. []         Patient understands intent and goals of therapy, but is neutral about his/her participation.   []         Patient is unable to participate in stated goals/plan of care: ongoing with therapy staff.  []         Other:        Baudilio Salgado PT   Time Calculation: 15 mins

## 2021-10-05 NOTE — PROGRESS NOTES
0730 Bedside and Verbal shift change report given to GREGORIO Sauer (oncoming nurse) by Preston Bautista RN (offgoing nurse). Report included the following information SBAR, Kardex, Intake/Output, and MAR. Pt in bed, call light in reach. 7825 Pt assessed. No signs of acute distress. Pt in bed.    1249 Pt assessed. No signs of acute distress. Pt in bed.    1604 Pt assessed. No signs of acute distress. Pt in bed. 1915 Bedside and Verbal shift change report given to Leilani Read RN (oncoming nurse) by Ivanna Carlos RN (offgoing nurse). Report included the following information SBAR, Kardex, Intake/Output, and MAR. Pt in bed, call light in reach.

## 2021-10-05 NOTE — PROGRESS NOTES
Comprehensive Nutrition Assessment    Type and Reason for Visit: Reassess    Nutrition Recommendations/Plan: Continue current diet and ONS. Nutrition Assessment:  PMHx: COPD, COVID 23 (9/2021), osteomyelitis of ankle, HTN, CKD. patient admitted due to COPD exacerbation and febrile illness. Last BM 10/4/21. Meds and labs reviewed- Florastor, Miralax, Zofran, MVI, ferrous sulfate, pepcid, vitamin C. Estimated Daily Nutrient Needs:  Energy (kcal): 2103; Weight Used for Energy Requirements: Current  Protein (g): 84; Weight Used for Protein Requirements: Current (1 g/kg)  Fluid (ml/day): 2103; Method Used for Fluid Requirements: 1 ml/kcal    Nutrition Related Findings:  Per documentation, PO intake % as of 10/05/21. [place] to pt's nurse- confirmed intake of about 100% at meals, was unsure of intake of Justin supplements, but will keep sending them. Wounds:    Unstageable (unspecified wound noted in pt's chart)       Current Nutrition Therapies:  ADULT DIET Regular; 4 carb choices (60 gm/meal); Low Fat/Low Chol/High Fiber/ELOY; No Salt Added (3-4 gm)  ADULT ORAL NUTRITION SUPPLEMENT Lunch, Dinner; Wound Healing Supplement    Anthropometric Measures:  · Height:  5' 9\" (175.3 cm)  · Current Body Wt:  84.1 kg (185 lb 6.5 oz)   · Admission Body Wt:  201 lb    · Usual Body Wt:  87.1 kg (192 lb) (RD note from 8/25/21)     · Ideal Body Wt:  160 lbs:  115.9 %    · BMI Category: Overweight (BMI 25.0-29. 9)       Nutrition Diagnosis:   · Overweight/obesity related to excessive energy intake as evidenced by BMI    Nutrition Interventions:   Food and/or Nutrient Delivery: Continue current diet, Mineral supplement, Vitamin supplement, Continue oral nutrition supplement  Nutrition Education and Counseling: No recommendations at this time  Coordination of Nutrition Care: Continue to monitor while inpatient    Goals:  Pt will maintain intake >50% of most meals and supplements throughout the next 5-7 days.        Nutrition Monitoring and Evaluation:   Behavioral-Environmental Outcomes: None identified  Food/Nutrient Intake Outcomes: Food and nutrient intake, Supplement intake, Vitamin/mineral intake  Physical Signs/Symptoms Outcomes: Biochemical data, Meal time behavior, GI status, Skin, Weight    Discharge Planning:    Continue current diet     Electronically signed by Dorrie Schwab on 10/5/2021 at 2:49 PM

## 2021-10-05 NOTE — ROUTINE PROCESS
Bedside and Verbal shift change report given to ALEXSANDER Gaspar (oncoming nurse) by CHRIS Grewal R.N. (offgoing nurse). Report included the following information SBAR, Kardex, Intake/Output, MAR, Accordion, Recent Results, and Med Rec Status.

## 2021-10-05 NOTE — PROGRESS NOTES
DC Plan: TBD    Based off of yesterday's care , [facility] admissions admissions coordinator was contacted to ask for potential readmission - unfortunately, patient has used up his SNF benefit; per admissions coordinator they attempted to get his wife to apply for Medicaid as it appears this patient may need to have LTC; they live in a mobile home and patient is not ambulatory at this time and home is not equipped for wheelchair access. Care manager will forward request to [facility] to have them start LTC Medicaid screening and have called and left a message with patient's daughter requesting a call back to discuss care options.

## 2021-10-05 NOTE — PROGRESS NOTES
Hospitalist Progress Note    Patient: Lina Christiansen MRN: [id number]  CSN: [id number]    YOB: 1945  Age: 64 y.o. Sex: male    DOA: 9/30/2021 LOS:  LOS: 0 days                Assessment/Plan     Patient Active Problem List   Diagnosis Code    Hypertension I10    Chronic obstructive pulmonary disease ([facility] J44.9    Chronic renal disease, stage 3, moderately decreased glomerular filtration rate between 30-59 mL/min/1.73 square meter ([facility]) N18.30    Age-related physical debility R54    Chronic respiratory failure with hypoxia ([facility]) J96.11    Tobacco dependence F17.200    Left hip pain M25.552    PAF (paroxysmal atrial fibrillation) ([facility]) I48.0    Avascular necrosis of bone of left hip ([facility]) M87.052    Obesity E66.9    Acute pulmonary edema ([facility]) J81.0    Anemia D64.9    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 02XA    Scrotal edema N50.89    Open wound of left hip S71.002A    Intercondylar fracture of femur ([facility]) S72.413A    Left leg pain M79.605    Open wound of left ankle S91.002A    Infected wound T14. 8XXA, L08.9    Supplemental oxygen dependent Z99.81    MRSA (methicillin resistant staph aureus) culture positive Z22.322    Pyogenic inflammation of bone ([facility]) M86.9    Osteomyelitis of left ankle ([facility]) M86.9    Cellulitis of left ankle L03. 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 [address] L97.323    COPD with acute exacerbation ([facility]) J44.1    Febrile illness R50.9    DVT (deep venous thrombosis) ([facility]) I82.409    COPD exacerbation ([facility] [address] J44.1          Chief Complain:  Lina Christiansen is a 64 y.o. male with PMHX hypertension, COPD, CRD, prostate cancer, brain aneurysm, on home O2  due to chronic respiratory failure and medical noncompliance. fracture of left ankle. Multiple admissions to this hospital.  Presents to ER due to falls at home and fever.     Passing clots in urine, LE cellulitis -  Continue with antibiotics    Recent right ankle open wound, cultures growing MRSA on chronic doxycycline. ID and podiatry consulted    Right leg DVT -  Hold heparin drip due to hematuria    Hematuria -  History of prostate ca  Hold heparin drip. Seen by urology, dewitt placed    hypertension -  continue home medication     Chronic COPD with chronic respiratory failure and oxygen supplementation-  Resume home medication  DuoNebs as needed  Oxygen supplementation     Chronic renal disease stage III -  Avoid nephrotoxins  Creatinine at baseline  Normal electrolytes     Paroxysmal atrial fibrillation -  No acute issues  Resume home medication     Anemia -  Chronic, monitor H/H    Recent Intercondylar fracture of left distal femur -   Conservative management recommended.     GI prophylaxis with pepcid. Prognosis poor with multiple hospital admissions. Disposition : TBD    Review of systems  General: No fevers or chills. Cardiovascular: No chest pain or pressure. No palpitations. Pulmonary: No shortness of breath. Gastrointestinal: No nausea, vomiting. Physical Exam:  General: Awake, cooperative, no acute distress    HEENT: NC, Atraumatic. PERRLA, anicteric sclerae. Lungs: CTA Bilaterally. No Wheezing/Rhonchi/Rales. Heart:  S1 S2,  No murmur, No Rubs, No Gallops  Abdomen: Soft, Non distended, Non tender.  +Bowel sounds,   Extremities:   Psych:   Not anxious or agitated. Neurologic:  No acute neurological deficit.            Vital signs/Intake and Output:  Visit Vitals  BP (!) 124/59   Pulse 79   Temp 98.6 °F (37 °C)   Resp 20   Ht 5' 9\" (1.753 m)   Wt 84.1 kg (185 lb 6.5 oz)   SpO2 94%   BMI 27.38 kg/m²     Current Shift:  10/05 0701 - 10/05 1900  In: 600 [P.O.:600]  Out: -   Last three shifts:  10/03 1901 - 10/05 0700  In: 240 [P.O.:240]  Out: 675 [Urine:675]            Labs: Results:       Chemistry Recent Labs     10/04/21  0520 10/03/21  0555   GLU 92 91    143   K 3.5 3.6    109   CO2 25 29   BUN 33* 38*   CREA 1.49* 1.86*   CA 7.5* 7.7*   AGAP 9 5   BUCR 22* 20   AP 61 57   TP 5.0* 5.5*   ALB 1.8* 1.8*   GLOB 3.2 3.7   AGRAT 0.6* 0.5*      CBC w/Diff Recent Labs     10/04/21  0520 10/03/21  0555   WBC 9.4 8.4   RBC 2.93* 2.83*   HGB 7.9* 7.8*   HCT 24.8* 24.8*    193   GRANS 67 75*   LYMPH 11* 12*   EOS 8* 3      Cardiac Enzymes No results for input(s): CPK, CKND1, WILLARD in the last 72 hours. No lab exists for component: CKRMB, TROIP   Coagulation Recent Labs     10/05/21  0207 10/04/21  0520   APTT 93.0* 87.5*       Lipid Panel No results found for: CHOL, CHOLPOCT, CHOLX, CHLST, CHOLV, 487649, HDL, HDLP, LDL, LDLC, DLDLP, 936584, VLDLC, VLDL, TGLX, TRIGL, TRIGP, TGLPOCT, CHHD, CHHDX   BNP No results for input(s): BNPP in the last 72 hours.    Liver Enzymes Recent Labs     10/04/21  0520   TP 5.0*   ALB 1.8*   AP 61      Thyroid Studies Lab Results   Component Value Date/Time    TSH 3.06 04/05/2021 01:30 PM        Procedures/imaging: see electronic medical records for all procedures/Xrays and details which were not copied into this note but were reviewed prior to creation of Plan

## 2021-10-05 NOTE — PROGRESS NOTES
[facility]  (A Division of [address] [place] [address])                                                                                                                       Lillian Handley MD  Office #:     [phone]   Office Fax: [phone]     Date of Admission: 9/30/2021Date of Note: 10/5/2021  Reason for FU: Evaluation and antibiotic management of fever      Current Antimicrobials:    Prior Antimicrobials:  Zosyn 9/30 to -> augmentin 10/4 to date     Doxycycline IV 9/30 to 10/1       Assessment- ID related:  --------------------------------------------------------------------------  · Possible sepsis: Fever with mild leucocytosis--Not sure what the source is. Possible UTI?has hematuria. Doubt cellulitis- graft site- L ankle looks ok, skin changes noted. Doubt OM with normal x-ray- no mention of OM with chronic wound in place. CXR with chronic changes as well. · URIEL- Cr bump to 1.64-- due to sepsis Vs other causes   Covid rapid test negative  BCX 9/30- NGSF  UCX 10/2: NGSF( ABX)  WCX- 10/1-mod dipheroids + S.aureus  History of MRSA pna- 4/2021  Wound infection 8/2021-- treated with doxy/cipro  X10 days from 8/23/21    Other Medical Issues- Mx per respective team:  Anemia- 7.9  L fem fracture-hx  COPD O2 dependent  Hx of brain aneurism  L femur # 07/2021-> OM left tibia  Diverticulosis without diverticulitis   Recommendation for ID issues I am following:  -----------------------------------------------------------------------------    Switch over to oral Augmeint 500/125 BID  Can give for 5-7 days more    WCX growing S.aurues-> add Linezolid 600mg BID until final ID available( likely doxy RR as he was on doxy before)       Subjective:  No complaints, very hard of hearing. No issue with current oral abx    10/4Afebrile- 97.6, WBC 9.4K. 99% on 2 L, not tachcyardic  UA with many rbc 10/2.  Cr down to 1.49  Denies new issue - urine clearing up  CT AP didn't show acute abd process  Nuc med imaging didn't show OM  UA had hematuria        HPI:  Cris Trevizo is a 64 y.o. WHITE/NON- with PMH as listed below, known to me from admission in August 2021. He is very hard of hearing, and difficult to piece together the history from him alone. Reviewed chart in detail and DW with Podiatyr/nursing. Seems like he was DC'ed from SNF to home recently, and he had a fall when EMS called. Temp was high to 103.1 by EMS ED chart stated. He doesn't recall high fevers/chills/cough/SOB/abd pain- but does have discomfort when passing urine. Doesn't recall for how  Long. Denies increasing pain on left hip-prior fracture site. In ED, temp 100.7, WBC 12K, 100% on 2L, BP in ED 97/50. CPK is ok. Cr is bumped up to 1.6. CXR with no acute changes. X-ray of foot and ankle L- no mention of OM/fracture. He had graft by Podiatry and healing taking place nicely. Found to have acute dVT LE. Currently on IV doxycycline and Zosyn. Not clear when he tool oral doxy last.    He was in Ed for Regeneron infusion on 9/10/21.          Active Hospital Problems    Diagnosis Date Noted    DVT (deep venous thrombosis) ([address] 10/01/2021    COPD with acute exacerbation ([address] 09/30/2021    Febrile illness 09/30/2021     Past Medical History:   Diagnosis Date    Brain aneurysm     Brain aneurysm     Cancer ([address]     prostate    CHF (congestive heart failure) (HCC)     CKD (chronic kidney disease)     Closed nondisplaced supracondylar fracture of lower end of left femur without intracondylar extension with delayed healing     COPD (chronic obstructive pulmonary disease) ([address]     Debility     History of home oxygen therapy     [place] (hard of hearing)     Hypertension     Nocturia     On home oxygen therapy     PAF (paroxysmal atrial fibrillation) ([address]     Pneumonia     Prostate CA ([address]     Prostate neoplasm     Radiation effect      Past Surgical History:   Procedure Laterality Date    HX HERNIA REPAIR      HX HIP ARTHROSCOPY  04/09/2021     Family History   Problem Relation Age of Onset    Heart Disease Mother      Social History     Socioeconomic History    Marital status:      Spouse name: Not on file    Number of children: Not on file    Years of education: Not on file    Highest education level: Not on file   Occupational History    Not on file   Tobacco Use    Smoking status: Former Smoker     Types: Cigarettes    Smokeless tobacco: Never Used   Substance and Sexual Activity    Alcohol use: No    Drug use: Never    Sexual activity: Not on file   Other Topics Concern    Not on file   Social History Narrative    Not on file     Social Determinants of Health     Financial Resource Strain:     Difficulty of Paying Living Expenses:    Food Insecurity:     Worried About Running Out of Food in the Last Year:     [address] in the Last Year:    Transportation Needs:     Lack of Transportation (Medical):  Lack of Transportation (Non-Medical):    Physical Activity:     Days of Exercise per Week:     Minutes of Exercise per Session:    Stress:     Feeling of Stress :    Social Connections:     Frequency of Communication with Friends and Family:     Frequency of Social Gatherings with Friends and Family:     Attends [employer] Services:     Active Member of Clubs or Organizations:     Attends Club or Organization Meetings:     Marital Status:    Intimate Partner Violence:     Fear of Current or Ex-Partner:     Emotionally Abused:     Physically Abused:     Sexually Abused:         Allergies:  Aspirin and Bactrim [sulfamethoxazole-trimethoprim]     Medications:  Current Facility-Administered Medications   Medication Dose Route Frequency    linezolid (ZYVOX) tablet 600 mg  600 mg Oral Q12H    amoxicillin-clavulanate (AUGMENTIN) 500-125 mg per tablet 1 Tablet  1 Tablet Oral Q12H    polyethylene glycol (MIRALAX) packet 17 g  17 g Oral DAILY    albuterol (PROVENTIL HFA, VENTOLIN HFA, PROAIR HFA) inhaler 2 Puff  2 Puff Inhalation Q4H PRN    albuterol-ipratropium (DUO-NEB) 2.5 MG-0.5 MG/3 ML  3 mL Nebulization Q4H PRN    collagenase (SANTYL) 250 unit/gram ointment   Topical DAILY    dilTIAZem IR (CARDIZEM) tablet 30 mg  30 mg Oral TIDAC    famotidine (PEPCID) tablet 20 mg  20 mg Oral DAILY    ferrous sulfate tablet 325 mg  325 mg Oral BID WITH MEALS    furosemide (LASIX) tablet 20 mg  20 mg Oral DAILY    tamsulosin (FLOMAX) capsule 0.4 mg  0.4 mg Oral QPM    acetaminophen (TYLENOL) tablet 650 mg  650 mg Oral Q6H PRN    Or    acetaminophen (TYLENOL) suppository 650 mg  650 mg Rectal Q6H PRN    polyethylene glycol (MIRALAX) packet 17 g  17 g Oral DAILY PRN    ondansetron (ZOFRAN ODT) tablet 4 mg  4 mg Oral Q8H PRN    Or    ondansetron (ZOFRAN) injection 4 mg  4 mg IntraVENous Q6H PRN    arformoteroL (BROVANA) neb solution 15 mcg  15 mcg Nebulization BID RT    budesonide (PULMICORT) 500 mcg/2 ml nebulizer suspension  500 mcg Nebulization BID RT    multivitamin, tx-iron-ca-min (THERA-M w/ IRON) tablet 1 Tablet  1 Tablet Oral DAILY    ascorbic acid (vitamin C) (VITAMIN C) tablet 500 mg  500 mg Oral DAILY    Saccharomyces boulardii (FLORASTOR) capsule 250 mg  250 mg Oral BID    heparin 25,000 units in D5W 250 ml infusion  18-36 Units/kg/hr IntraVENous TITRATE        ROS:  Pertinent items are noted in the History of Present Illness. Physical Exam:    Temp (24hrs), Av.9 °F (36.6 °C), Min:97.5 °F (36.4 °C), Max:98.4 °F (36.9 °C)    Visit Vitals  BP (!) 123/50   Pulse 79   Temp 98.4 °F (36.9 °C)   Resp 20   Ht 5' 9\" (1.753 m)   Wt 84.1 kg (185 lb 6.5 oz)   SpO2 99%   BMI 27.38 kg/m²          GEN: WD frail, chronically ill looking,  not in resp distress. HEENT: Unicteric. EOMI intact  CHEST: Non laboured breathing. Skin: Dry and intact. No rash, no redness. CNS: A, OX3. Moves all extremity.  hard of hearing      Microbiology  All Micro Results Procedure Component Value Units Date/Time    CULTURE, BLOOD [[id number]] Collected: 09/30/21 2052    Order Status: Completed Specimen: Blood Updated: 10/05/21 0645     Special Requests: NO SPECIAL REQUESTS        Culture result: NO GROWTH 5 DAYS       CULTURE, BLOOD [[id number]] Collected: 09/30/21 2032    Order Status: Completed Specimen: Blood Updated: 10/05/21 0645     Special Requests: NO SPECIAL REQUESTS        Culture result: NO GROWTH 5 DAYS       CULTURE, WOUND Suellen Pucker STAIN [[id number]]  (Abnormal) Collected: 10/01/21 1115    Order Status: Completed Specimen: Wound Drainage Updated: 10/04/21 1410     Special Requests: NO SPECIAL REQUESTS        GRAM STAIN FEW WBCS SEEN         NO ORGANISMS SEEN        Culture result:       SCANT POSSIBLE STAPHYLOCOCCUS AUREUS            HEAVY DIPHTHEROIDS       CULTURE, URINE [[id number]] Collected: 10/02/21 0618    Order Status: Completed Specimen: Cath Urine Updated: 10/04/21 0811     Special Requests: NO SPECIAL REQUESTS        Culture result: No growth (<1,000 CFU/ML)       INFLUENZA A & B AG (RAPID TEST) [[id number]] Collected: 09/30/21 2052    Order Status: Completed Specimen: Nasopharyngeal from Nasal washing Updated: 09/30/21 2124     Influenza A Antigen Negative        Comment: A negative result does not exclude influenza virus infection, seasonal or H1N1 due to suboptimal sensitivity. If influenza is circulating in your community, a diagnosis of influenza should be considered based on a patients clinical presentation and empiric antiviral treatment should be considered, if indicated.         Influenza B Antigen Negative       COVID-19 RAPID TEST [[id number]] Collected: 09/30/21 2052    Order Status: Completed Specimen: Nasopharyngeal Updated: 09/30/21 2122     Specimen source Nasopharyngeal        COVID-19 rapid test Not detected        Comment: Rapid [employer] ID Now       Rapid NAAT:  The specimen is NEGATIVE for SARS-CoV-2, the novel coronavirus associated with COVID-19. Negative results should be treated as presumptive and, if inconsistent with clinical signs and symptoms or necessary for patient management, should be tested with an alternative molecular assay. Negative results do not preclude SARS-CoV-2 infection and should not be used as the sole basis for patient management decisions. This test has been authorized by the FDA under an Emergency Use Authorization (EUA) for use by authorized laboratories. Fact sheet for Healthcare Providers: eKonnektte.co.nz  Fact sheet for Patients: MirDeneg.co.nz       Methodology: Isothermal Nucleic Acid Amplification                    Lines / Catheters:  Lab results:    Chemistry  Recent Labs     10/04/21  0520 10/03/21  0555   GLU 92 91    143   K 3.5 3.6    109   CO2 25 29   BUN 33* 38*   CREA 1.49* 1.86*   CA 7.5* 7.7*   AGAP 9 5   BUCR 22* 20   AP 61 57   TP 5.0* 5.5*   ALB 1.8* 1.8*   GLOB 3.2 3.7   AGRAT 0.6* 0.5*       CBC w/ Diff  Recent Labs     10/04/21  0520 10/03/21  0555   WBC 9.4 8.4   RBC 2.93* 2.83*   HGB 7.9* 7.8*   HCT 24.8* 24.8*    193   GRANS 67 75*   LYMPH 11* 12*   EOS 8* 3       Imaging: report posted below as per radiologist     CT A/P- 10/1     Moderate amount of stool in the rectum with perirectal stranding suggesting  stercoral colitis.     2.7 x 1.6 x 3.1 centimeter filling defect in the urinary bladder with  differential diagnoses including blood clot versus bladder mass. Advise urology  consultation.     Colonic diverticulosis without diverticular colitis.     New sclerotic lesion at the S1 level raising the concern for osteoblastic  metastasis versus healing fracture. Advise MRI for further evaluation. nuc med: 10/1  1. Mild persistent but improved hyperemia and soft tissue uptake without  definite osseous uptake to suggest osteoarthritis.

## 2021-10-05 NOTE — CONSULTS
[facility]. Urology    Subjective:     Date of Consultation:  October 5, 2021    Referring Physician: Dr. Anya Guillory    Reason for Consultation:  Gross hematuria after heparin gtt for DVT    History of Present Illness:   Patient is a 64 y.o.  male who PMHX hypertension, COPD, CRD, prostate cancer, brain aneurysm, on home O2  due to chronic respiratory failure and medical noncompliance. fracture of left ankle. Humphreys cath 20fr 3 way with CBI port closed was placed and urine had small tiny clots but otherwise urine is clear yellow and draining. Heparin gtt has been resumed and urine is yellow. Pt on Heparin gtt for rt leg DVT. Hx of prostate cancer treated with radiation years ago.        BMP: No results found for: NA, K, CL, CO2, AGAP, GLU, BUN, CREA, GFRAA, GFRNA  CMP: No results found for: NA, K, CL, CO2, AGAP, GLU, BUN, CREA, GFRAA, GFRNA, CA, MG, PHOS, ALB, TBIL, TP, ALB, GLOB, AGRAT, ALT  CBC: No results found for: WBC, HGB, HGBEXT, HCT, HCTEXT, PLT, PLTEXT, HGBEXT, HCTEXT, PLTEXT     Date: 10/1/2021 Department: [address] Card/Med Released By/Authorizing: Julisa Connor MD (auto-released)   Contains abnormal data URINALYSIS W/ RFLX MICROSCOPIC  Order: [id number]  Collected:  10/2/2021 06:18 Status:  Final result   0 Result Notes   Ref Range & Units 10/2/21 0618 9/30/21 2130 8/20/21 1415   Color   YELLOW  YELLOW  YELLOW    Appearance   TURBID  CLEAR  CLEAR    Specific gravity 1.005 - 1.030   1.019  1.016  1.010    pH (UA) 5.0 - 8.0   5.0  5.0  6.5    Protein NEG mg/dL 100Abnormal   30Abnormal   Negative    Glucose NEG mg/dL Negative  Negative  Negative    Ketone NEG mg/dL Negative  Negative  Negative    Bilirubin NEG   Negative  Negative  Negative    Blood NEG   LARGEAbnormal   Negative  Negative    Urobilinogen 0.2 - 1.0 EU/dL 1.0  1.0  0.2    Nitrites NEG   Negative  Negative  Negative    Leukocyte Esterase NEG   SMALLAbnormal   Negative  Negative    Resulting                 Ref Range & Units 10/4/21 0520 10/3/21 0555 10/2/21 0325   Sodium 136 - 145 mmol/L 143  143     Potassium 3.5 - 5.5 mmol/L 3.5  3.6     Chloride 100 - 111 mmol/L 109  109     CO2 21 - 32 mmol/L 25  29     Anion gap 3.0 - 18 mmol/L 9  5     Glucose 74 - 99 mg/dL 92  91     BUN 7.0 - 18 MG/DL 33High   38High      Creatinine 0.6 - 1.3 MG/DL 1. 49High   1. 86High      BUN/Creatinine ratio 12 - 20   22High   20     GFR est AA >60 ml/min/1.73m2 55Low   43Low      GFR est non-AA >60 ml/min/1.73m2 46Low   35Low  CM     Calcium 8.5 - 10.1 MG/DL 7.5Low   7.7Low      Bilirubin, total 0.2 - 1.0 MG/DL 0.2  0.2  0.2    ALT (SGPT) 16 - 61 U/L 11Low         Date: 10/3/2021 Department: [facility] [address] [place] [address] Card/Med Released By/Authorizing: Edgard Michel MD (auto-released)   Contains abnormal data CBC WITH AUTOMATED DIFF  Order: [id number]  Collected:  10/4/2021 05:20 Status:  Final result   0 Result Notes   Ref Range & Units 10/4/21 0520 10/3/21 0555 10/2/21 0325   WBC 4.6 - 13.2 K/uL 9.4  8.4  8.8    RBC 4.35 - 5.65 M/uL 2.93Low   2.83Low   2. 72Low     HGB 13.0 - 16.0 g/dL 7.9Low   7.8Low   7.4Low     HCT 36.0 - 48.0 % 24.8Low   24.8Low   22.7Low     MCV 78.0 - 100.0 FL 84.6  87.6  83.5    MCH 24.0 - 34.0 PG 27.0  27.6  27.2    MCHC 31.0 - 37.0 g/dL                      Past Medical History:   Diagnosis Date    Brain aneurysm     Brain aneurysm     Cancer ([facility] [address]     prostate    CHF (congestive heart failure) (HCC)     CKD (chronic kidney disease)     Closed nondisplaced supracondylar fracture of lower end of left femur without intracondylar extension with delayed healing     COPD (chronic obstructive pulmonary disease) ([address]     Debility     History of home oxygen therapy     [place] (hard of hearing)     Hypertension     Nocturia     On home oxygen therapy     PAF (paroxysmal atrial fibrillation) ([address]     Pneumonia     Prostate CA ([address]     Prostate neoplasm     Radiation effect       Past Surgical History:   Procedure Laterality Date    HX HERNIA REPAIR  HX HIP ARTHROSCOPY  04/09/2021      Family History   Problem Relation Age of Onset    Heart Disease Mother       Social History     Tobacco Use    Smoking status: Former Smoker     Types: Cigarettes    Smokeless tobacco: Never Used   Substance Use Topics    Alcohol use: No     Allergies   Allergen Reactions    Aspirin Other (comments)     \"messes my stomach up\"    Bactrim [Sulfamethoxazole-Trimethoprim] Unknown (comments)      Prior to Admission medications    Medication Sig Start Date End Date Taking? Authorizing Provider   collagenase (SANTYL) 250 unit/gram ointment Apply  to affected area daily. 6/23/21   Eder Natarajan MD   arformoteroL (BROVANA) 15 mcg/2 mL nebu neb solution 2 mL by Nebulization route two (2) times a day. 4/14/21   Minus MD Casey   budesonide (PULMICORT) 0.5 mg/2 mL nbsp 2 mL by Nebulization route two (2) times a day. 4/14/21   Minus MD Casey   enoxaparin (LOVENOX) 40 mg/0.4 mL 0.4 mL by SubCUTAneous route daily. 4/14/21   Minus MD Casey   famotidine (PEPCID) 20 mg tablet Take 1 Tab by mouth daily. 4/14/21   Minus MD Casey   ferrous sulfate 325 mg (65 mg iron) tablet Take 1 Tab by mouth two (2) times daily (with meals). 4/14/21   Minus MD Casey   amLODIPine (NORVASC) 5 mg tablet Take 1 Tab by mouth daily. 3/3/21   Gal Zapata MD   albuterol-ipratropium (DUO-NEB) 2.5 mg-0.5 mg/3 ml nebu 3 mL by Nebulization route every four (4) hours as needed for Wheezing. 2/7/21   Jose Jordan MD   albuterol (PROVENTIL HFA, VENTOLIN HFA, PROAIR HFA) 90 mcg/actuation inhaler Take 2 Puffs by inhalation every four (4) hours as needed for Wheezing or Shortness of Breath. 2/7/21   Jose Jordan MD   OXYGEN-AIR DELIVERY SYSTEMS 2 L/min by Nasal route continuous. Provider, Historical   furosemide (Lasix) 20 mg tablet Take 20 mg by mouth daily. Provider, Historical   dilTIAZem (CARDIZEM) 30 mg tablet Take 1 Tab by mouth Before breakfast, lunch, and dinner.   Patient taking differently: Take 30 mg by mouth daily. Daily 20   Yoselin Julien MD   acetaminophen (TYLENOL) 325 mg tablet Take 650 mg by mouth every eight (8) hours as needed for Pain. Provider, Historical   dextran 70/hypromellose (ARTIFICIAL TEARS, PF, OP) Apply 2 Drops to eye as needed for Other (both eyes for dryness). Provider, Historical   tamsulosin (FLOMAX) 0.4 mg capsule Take 0.4 mg by mouth every evening. Other, MD Blayne         REVIEW OF SYSTEMS  System Neg/Pos Details   Constitutional Negative Chills and fever. ENMT Negative Ear infections and sore throat. Eyes Negative Blurred vision, double vision and eye pain. Respiratory Negative Asthma, chronic cough, dyspnea and wheezing. Cardio Negative Chest pain. GI Neg Constipation. GI Negative Decreased appetite, diarrhea, nausea and vomiting.  Neg Incomplete emptying, Intermittent urinary stream, Slow stream, Urinary straining.  Pos Hematuria resolved. Endocrine Negative Cold intolerance, heat intolerance, increased thirst and weight loss. Neuro Negative Headache and tremors. Psych Negative Anxiety and depression. Integumentary Negative Itching skin and rash. MS Negative Back pain and joint pain. Hema/Lymph Negative Easy bleeding. Objective:     Patient Vitals for the past 8 hrs:   BP Temp Pulse Resp SpO2 Weight   10/05/21 0338 (!) 126/55 97.7 °F (36.5 °C) 71 20 99 % --   10/05/21 0047 -- -- -- -- -- 84.1 kg (185 lb 6.5 oz)   10/05/21 0009 (!) 106/49 98 °F (36.7 °C) 78 21 100 % --     Temp (24hrs), Av.8 °F (36.6 °C), Min:97.5 °F (36.4 °C), Max:98 °F (36.7 °C)        Intake and Output:   10/03 1901 - 10/05 0700  In: 240 [P.O.:240]  Out: 675 [Urine:675]    Physical Exam:  Constitutional Normal Well developed. Neck Exam Normal Inspection - Normal. Palpation - Normal. Thyroid gland - Normal.   Respiratory Normal Inspection - No labored breathing   Abdomen Normal No abdominal tenderness. No hepatic enlargement.  No splenic enlargement. No hernia. Genitourinary Normal No Suprapubic tenderness. 20fr dewitt 3 way in place urine yellow. Skin Normal Inspection - Normal.   Musculoskeletal Normal Gait - Normal.   Extremity Normal No Edema. Neurological Normal Level of consciousness - Normal. Orientation - Normal.   Psychiatric Normal Oriented to time, place, person and situation. Appropriate mood and affect. Assessment:     Principal Problem:    Febrile illness (9/30/2021)    Active Problems:    COPD with acute exacerbation ([facility] [address] (9/30/2021)      DVT (deep venous thrombosis) ([facility] [address] (10/1/2021)    Gross hematuria resolved. Plan:     Hct is stable. Cont dewitt cath while getting heparin gtt until therapeutic level reached to assess if any worsening hematuria. Once dewitt cath not needed anymore, primary team can d/c for voiding trial.   Pt should follow up as outpatient for office cysto to r/o any bladder pathology. Call with questions.      Signed By: Michelle Lamas MD                         October 5, 2021

## 2021-10-05 NOTE — PROGRESS NOTES
PRN duo requested by patient, treatment given. No acute distress noted, patient grunting/ groaning vocal noise  which is his normal. SPO2 99% on 2 lpm cannula.

## 2021-10-05 NOTE — ROUTINE PROCESS
Bedside and Verbal shift change report given to Josef Molina RN (oncoming nurse) by Anusha Oliveros RN (offgoing nurse).  Report included the following information SBAR, Kardex, Intake/Output, MAR, Recent Results, and Cardiac Rhythm: Afib controlled converted to SR.

## 2021-10-05 NOTE — PROGRESS NOTES
Problem: Self Care Deficits Care Plan (Adult)  Goal: *Acute Goals and Plan of Care (Insert Text)  Description: Initial Occupational Therapy Goals (10/3/2021) Within 7 day(s):     1. Patient will perform perform grooming seated EOB for 5 minutes for increased independence in ADLs. 2. Patient will perform UB dressing with min A seated EOB for increased independence with ADLs. 3. Patient will perform LB dressing with min A & A/E PRN for increased independence with ADLs. 4. Patient will perform all aspects of toileting with min A for increased independence with ADLs. 5. Patient will perform LE ADLs utilizing body mechanics & adaptive strategies with 1 verbal cue for increased safety in ADLs. 6. Patient will independently apply energy conservation techniques with  verbal cue(s) for increased independence with ADLs. 10/5/2021 1952 by Samreen England OT  Outcome: Progressing Towards Goal    OCCUPATIONAL THERAPY TREATMENT    Patient: Fabby Whitlock (70 y.o. male)  Date: 10/5/2021  Diagnosis: Febrile illness [R50.9]  COPD with acute exacerbation ([facility]) [J44.1]  COPD exacerbation ([facility] [address] [J44.1] Febrile illness       Precautions: Fall, Skin, Contact  PLOF:     Chart, occupational therapy assessment, plan of care, and goals were reviewed. ASSESSMENT:  Pt presented seated EOB, fidgeting with call bell and room telephone. Requests to be assisted for rachid care and brief change. Pt encouraged to stand to assist with rachid care, but pt returned to bed stating he cannot stand up. Pt performed bed mobility, roll R<>L for rachid care and brief don/doff, and scooting towards HOB with min-CGA during this session. Pt refusing further participation with therapy and requests to rest. Pt was left long sitting in bed at the end of session in NAD, SpO2 at 98% on 2 L supplemental O2.      Progression toward goals:  []          Improving appropriately and progressing toward goals  [x]          Improving slowly and progressing toward goals  [] Not making progress toward goals and plan of care will be adjusted     PLAN:  Patient continues to benefit from skilled intervention to address the above impairments. Continue treatment per established plan of care. Discharge Recommendations:  Home Health and [facility] Equipment Recommendations for Discharge:  N/A     SUBJECTIVE:   Patient stated  I cannot stand.     OBJECTIVE DATA SUMMARY:   Cognitive/Behavioral Status:  Neurologic State: Alert  Orientation Level: Oriented X4  Cognition: Appropriate for age attention/concentration, Follows commands  Safety/Judgement: Fall prevention    Functional Mobility and Transfers for ADLs:   Bed Mobility:  Rolling: Supervision  Supine to Sit: Stand-by assistance  Sit to Supine: Stand-by assistance  Scooting: Stand-by assistance  Balance:  Sitting: Intact  Sitting - Static: Good (unsupported)    ADL Intervention:  Lower Body Bathing  Bathing Assistance: Total assistance(dependent)  Perineal  : Total assistance (dependent)  Position Performed: Supine    Lower Body Dressing Assistance  Dressing Assistance: Maximum assistance  Protective Undergarmet: Maximum assistance  Leg Crossed Method Used: No  Position Performed: Supine  Cues: Don;Doff    Toileting  Toileting Assistance: Total assistance(dependent)  Bladder Hygiene: Total assistance (dependent)  Bowel Hygiene: Total assistance (dependent)  Clothing Management: Total assistance (dependent)    Cognitive Retraining  Safety/Judgement: Fall prevention  Pain:  Pain level pre-treatment: 0/10   Pain level post-treatment: 0/10  Pain Intervention(s): Medication (see MAR); Rest, Ice, Repositioning   Response to intervention: Nurse notified, See doc flow    Activity Tolerance:    Fair     Please refer to the flowsheet for vital signs taken during this treatment.   After treatment:   []  Patient left in no apparent distress sitting up in chair  [x]  Patient left in no apparent distress in bed  [x]  Call perez left within reach  [x]  Nursing notified  []  Caregiver present  []  Bed alarm activated    COMMUNICATION/EDUCATION:   [x] Role of Occupational Therapy in the acute care setting  [x] Home safety education was provided and the patient/caregiver indicated understanding. [x] Patient/family have participated as able in working towards goals and plan of care. [x] Patient/family agree to work toward stated goals and plan of care. [] Patient understands intent and goals of therapy, but is neutral about his/her participation. [] Patient is unable to participate in goal setting and plan of care.       Thank you for this referral.  Leah Boo OTR/L  Time Calculation: 20 mins

## 2021-10-06 LAB
APTT PPP: 108.9 SEC (ref 23–36.4)
BACTERIA SPEC CULT: NORMAL
BACTERIA SPEC CULT: NORMAL
SERVICE CMNT-IMP: NORMAL
SERVICE CMNT-IMP: NORMAL

## 2021-10-06 PROCEDURE — 36415 COLL VENOUS BLD VENIPUNCTURE: CPT

## 2021-10-06 PROCEDURE — 97116 GAIT TRAINING THERAPY: CPT

## 2021-10-06 PROCEDURE — 74011250637 HC RX REV CODE- 250/637: Performed by: INTERNAL MEDICINE

## 2021-10-06 PROCEDURE — 97530 THERAPEUTIC ACTIVITIES: CPT

## 2021-10-06 PROCEDURE — 85730 THROMBOPLASTIN TIME PARTIAL: CPT

## 2021-10-06 PROCEDURE — 74011000250 HC RX REV CODE- 250: Performed by: INTERNAL MEDICINE

## 2021-10-06 PROCEDURE — 94640 AIRWAY INHALATION TREATMENT: CPT

## 2021-10-06 PROCEDURE — 74011250636 HC RX REV CODE- 250/636: Performed by: INTERNAL MEDICINE

## 2021-10-06 PROCEDURE — 74011250637 HC RX REV CODE- 250/637: Performed by: HOSPITALIST

## 2021-10-06 PROCEDURE — 97110 THERAPEUTIC EXERCISES: CPT

## 2021-10-06 PROCEDURE — 65270000029 HC RM PRIVATE

## 2021-10-06 PROCEDURE — 77010033678 HC OXYGEN DAILY

## 2021-10-06 RX ADMIN — DILTIAZEM HYDROCHLORIDE 30 MG: 30 TABLET, FILM COATED ORAL at 11:25

## 2021-10-06 RX ADMIN — DILTIAZEM HYDROCHLORIDE 30 MG: 30 TABLET, FILM COATED ORAL at 17:06

## 2021-10-06 RX ADMIN — TAMSULOSIN HYDROCHLORIDE 0.4 MG: 0.4 CAPSULE ORAL at 17:06

## 2021-10-06 RX ADMIN — ARFORMOTEROL TARTRATE 15 MCG: 15 SOLUTION RESPIRATORY (INHALATION) at 19:43

## 2021-10-06 RX ADMIN — LINEZOLID 600 MG: 600 TABLET, FILM COATED ORAL at 22:13

## 2021-10-06 RX ADMIN — Medication 1 TABLET: at 08:17

## 2021-10-06 RX ADMIN — FERROUS SULFATE TAB 325 MG (65 MG ELEMENTAL FE) 325 MG: 325 (65 FE) TAB at 08:17

## 2021-10-06 RX ADMIN — AMOXICILLIN AND CLAVULANATE POTASSIUM 1 TABLET: 500; 125 TABLET, FILM COATED ORAL at 22:14

## 2021-10-06 RX ADMIN — ARFORMOTEROL TARTRATE 15 MCG: 15 SOLUTION RESPIRATORY (INHALATION) at 07:19

## 2021-10-06 RX ADMIN — DILTIAZEM HYDROCHLORIDE 30 MG: 30 TABLET, FILM COATED ORAL at 08:17

## 2021-10-06 RX ADMIN — AMOXICILLIN AND CLAVULANATE POTASSIUM 1 TABLET: 500; 125 TABLET, FILM COATED ORAL at 08:17

## 2021-10-06 RX ADMIN — Medication 250 MG: at 22:14

## 2021-10-06 RX ADMIN — FAMOTIDINE 20 MG: 20 TABLET ORAL at 08:17

## 2021-10-06 RX ADMIN — FERROUS SULFATE TAB 325 MG (65 MG ELEMENTAL FE) 325 MG: 325 (65 FE) TAB at 17:06

## 2021-10-06 RX ADMIN — COLLAGENASE SANTYL: 250 OINTMENT TOPICAL at 08:18

## 2021-10-06 RX ADMIN — LINEZOLID 600 MG: 600 TABLET, FILM COATED ORAL at 08:17

## 2021-10-06 RX ADMIN — Medication 250 MG: at 08:17

## 2021-10-06 RX ADMIN — IPRATROPIUM BROMIDE AND ALBUTEROL SULFATE 3 ML: .5; 3 SOLUTION RESPIRATORY (INHALATION) at 19:43

## 2021-10-06 RX ADMIN — Medication 500 MG: at 08:17

## 2021-10-06 RX ADMIN — BUDESONIDE 500 MCG: 0.5 INHALANT RESPIRATORY (INHALATION) at 07:19

## 2021-10-06 RX ADMIN — BUDESONIDE 500 MCG: 0.5 INHALANT RESPIRATORY (INHALATION) at 19:43

## 2021-10-06 RX ADMIN — FUROSEMIDE 20 MG: 20 TABLET ORAL at 08:17

## 2021-10-06 RX ADMIN — HEPARIN SODIUM 15 UNITS/KG/HR: 10000 INJECTION, SOLUTION INTRAVENOUS at 07:36

## 2021-10-06 RX ADMIN — IPRATROPIUM BROMIDE AND ALBUTEROL SULFATE 3 ML: .5; 3 SOLUTION RESPIRATORY (INHALATION) at 07:19

## 2021-10-06 NOTE — ROUTINE PROCESS
Bedside and Verbal shift change report given to Deepika Harrison RN (oncoming nurse) by Lawanda Vogel RN (offgoing nurse). Report included the following information SBAR and Kardex.

## 2021-10-06 NOTE — PROGRESS NOTES
Care manager verified that patient has no more SNF days with [facility]; discussed with MD; plan will be that dewitt will be removed and patient should be ready for discharge in 1-2 days; have called and left message with daughter Blayne Amos and wife Lisandro Abdul to discuss plans to take patient home with home health services.     Care Management Interventions  Mode of Transport at Discharge: BLS  Transition of Care Consult (CM Consult): Discharge Planning, [address]: Yes  Health Maintenance Reviewed: Yes  Physical Therapy Consult: Yes  Occupational Therapy Consult: Yes  Support Systems: Spouse/Significant Other  The Plan for Transition of Care is Related to the Following Treatment Goals :  [facility] with physician follow up       The Patient and/or Patient Representative was Provided with a Choice of Provider and Agrees with the Discharge Plan?: Yes  Name of the Patient Representative Who was Provided with a Choice of Provider and Agrees with the Discharge Plan: spouse  Freedom of Choice List was Provided with Basic Dialogue that Supports the Patient's Individualized Plan of Care/Goals, Treatment Preferences and Shares the Quality Data Associated with the Providers?: Yes  Discharge Location  Discharge Placement: Home with home health [facility] with physician follow up  )

## 2021-10-06 NOTE — PROGRESS NOTES
Problem: Falls - Risk of  Goal: *Absence of Falls  Description: Document [place] Flow Fall Risk and appropriate interventions in the flowsheet. Outcome: Progressing Towards Goal  Note: Fall Risk Interventions:            Medication Interventions: Bed/chair exit alarm, Patient to call before getting OOB    Elimination Interventions: Call light in reach    History of Falls Interventions: Door open when patient unattended         Problem: Patient Education: Go to Patient Education Activity  Goal: Patient/Family Education  Outcome: Progressing Towards Goal     Problem: Pressure Injury - Risk of  Goal: *Prevention of pressure injury  Description: Document Nikos Scale and appropriate interventions in the flowsheet.   Outcome: Progressing Towards Goal  Note: Pressure Injury Interventions:  Sensory Interventions: Assess changes in LOC    Moisture Interventions: Absorbent underpads, Maintain skin hydration (lotion/cream), Offer toileting Q_hr    Activity Interventions: Pressure redistribution bed/mattress(bed type)    Mobility Interventions: HOB 30 degrees or less    Nutrition Interventions: Document food/fluid/supplement intake    Friction and Shear Interventions: HOB 30 degrees or less                Problem: Patient Education: Go to Patient Education Activity  Goal: Patient/Family Education  Outcome: Progressing Towards Goal     Problem: Patient Education: Go to Patient Education Activity  Goal: Patient/Family Education  Outcome: Progressing Towards Goal     Problem: Deep Venous Thrombosis - Risk of  Goal: *Absence of deep venous thrombosis signs and symptoms(Stroke Metric)  Outcome: Progressing Towards Goal  Goal: *Absence of impaired coagulation signs and symptoms  Outcome: Progressing Towards Goal  Goal: *Knowledge of prescribed medications  Outcome: Progressing Towards Goal  Goal: *Absence of bleeding  Outcome: Progressing Towards Goal     Problem: Patient Education: Go to Patient Education Activity  Goal: Patient/Family Education  Outcome: Progressing Towards Goal     Problem: Patient Education: Go to Patient Education Activity  Goal: Patient/Family Education  Outcome: Progressing Towards Goal

## 2021-10-06 NOTE — PROGRESS NOTES
[facility]  (A Division of [address] [place] [address])                                                                                                                       Lillian Pulido MD  Office #:     [phone]  [id number] [phone]   Office Fax: [phone]     Date of Admission: 9/30/2021Date of Note: 10/5/2021  Reason for FU: Evaluation and antibiotic management of fever      Current Antimicrobials:    Prior Antimicrobials:  Zosyn 9/30 to -> augmentin 10/4 to date  linezolid 10/5 to date     Doxycycline IV 9/30 to 10/1       Assessment- ID related:  --------------------------------------------------------------------------  · Possible sepsis: Fever with mild leucocytosis--Not sure what the source is. Possible UTI?has hematuria. Doubt cellulitis- graft site- L ankle looks ok, skin changes noted. Doubt OM with normal x-ray- no mention of OM with chronic wound in place. CXR with chronic changes as well. · URIEL- Cr bump to 1.64-- due to sepsis Vs other causes   Covid rapid test negative  BCX 9/30- NGSF  UCX 10/2: NGSF( ABX)  WCX- 10/1-mod dipheroids + MRSA  History of MRSA pna- 4/2021  Wound infection 8/2021-- treated with doxy/cipro  X10 days from 8/23/21    Other Medical Issues- Mx per respective team:  Anemia- 7.9  L fem fracture-hx  COPD O2 dependent  Hx of brain aneurism  L femur # 07/2021-> OM left tibia  Diverticulosis without diverticulitis   Recommendation for ID issues I am following:  -----------------------------------------------------------------------------    Switch over to oral Augmeint 500/125 BID  Can give for 5-7 days more- end date Oct 10    Linezolid 600 mg PO BID X7 days- Oct 12    Discharge per primary    Will sign off. Please re-consult as needed. Subjective:  No complaints, appears comfortable. Very hard of hearing. No issue with current oral abx    No fever/chills or vomiting. HPI:  June Astudillo is a 64 y.o.  WHITE/NON- with PMH as listed below, known to me from admission in August 2021. He is very hard of hearing, and difficult to piece together the history from him alone. Reviewed chart in detail and DW with Podiatyr/nursing. Seems like he was DC'ed from SNF to home recently, and he had a fall when EMS called. Temp was high to 103.1 by EMS ED chart stated. He doesn't recall high fevers/chills/cough/SOB/abd pain- but does have discomfort when passing urine. Doesn't recall for how  Long. Denies increasing pain on left hip-prior fracture site. In ED, temp 100.7, WBC 12K, 100% on 2L, BP in ED 97/50. CPK is ok. Cr is bumped up to 1.6. CXR with no acute changes. X-ray of foot and ankle L- no mention of OM/fracture. He had graft by Podiatry and healing taking place nicely. Found to have acute dVT LE. Currently on IV doxycycline and Zosyn. Not clear when he tool oral doxy last.    He was in Ed for Regeneron infusion on 9/10/21.          Active Hospital Problems    Diagnosis Date Noted    DVT (deep venous thrombosis) ([address] 10/01/2021    COPD with acute exacerbation ([address] 09/30/2021    Febrile illness 09/30/2021     Past Medical History:   Diagnosis Date    Brain aneurysm     Brain aneurysm     Cancer ([address]     prostate    CHF (congestive heart failure) (HCC)     CKD (chronic kidney disease)     Closed nondisplaced supracondylar fracture of lower end of left femur without intracondylar extension with delayed healing     COPD (chronic obstructive pulmonary disease) ([address]     Debility     History of home oxygen therapy     [place] (hard of hearing)     Hypertension     Nocturia     On home oxygen therapy     PAF (paroxysmal atrial fibrillation) (HCC)     Pneumonia     Prostate CA ([address]     Prostate neoplasm     Radiation effect      Past Surgical History:   Procedure Laterality Date    HX HERNIA REPAIR      HX HIP ARTHROSCOPY  04/09/2021     Family History   Problem Relation Age of Onset    Heart Disease Mother      Social History     Socioeconomic History    Marital status:      Spouse name: Not on file    Number of children: Not on file    Years of education: Not on file    Highest education level: Not on file   Occupational History    Not on file   Tobacco Use    Smoking status: Former Smoker     Types: Cigarettes    Smokeless tobacco: Never Used   Substance and Sexual Activity    Alcohol use: No    Drug use: Never    Sexual activity: Not on file   Other Topics Concern    Not on file   Social History Narrative    Not on file     Social Determinants of Health     Financial Resource Strain:     Difficulty of Paying Living Expenses:    Food Insecurity:     Worried About [employer] [employer] in the Last Year:     [address] [place] in the Last Year:    Transportation Needs:     Lack of Transportation (Medical):  Lack of Transportation (Non-Medical):    Physical Activity:     Days of Exercise per Week:     Minutes of Exercise per Session:    Stress:     Feeling of Stress :    Social Connections:     Frequency of Communication with Friends and Family:     Frequency of Social Gatherings with Friends and Family:     Attends Mu-ism Services:     Active Member of Clubs or Organizations:     Attends Club or Organization Meetings:     Marital Status:    Intimate Partner Violence:     Fear of Current or Ex-Partner:     Emotionally Abused:     Physically Abused:     Sexually Abused:         Allergies:  Aspirin and Bactrim [sulfamethoxazole-trimethoprim]     Medications:  Current Facility-Administered Medications   Medication Dose Route Frequency    linezolid (ZYVOX) tablet 600 mg  600 mg Oral Q12H    amoxicillin-clavulanate (AUGMENTIN) 500-125 mg per tablet 1 Tablet  1 Tablet Oral Q12H    polyethylene glycol (MIRALAX) packet 17 g  17 g Oral DAILY    albuterol (PROVENTIL HFA, VENTOLIN HFA, PROAIR HFA) inhaler 2 Puff  2 Puff Inhalation Q4H PRN    albuterol-ipratropium (DUO-NEB) 2.5 MG-0.5 MG/3 ML  3 mL Nebulization Q4H PRN    collagenase (SANTYL) 250 unit/gram ointment   Topical DAILY    dilTIAZem IR (CARDIZEM) tablet 30 mg  30 mg Oral TIDAC    famotidine (PEPCID) tablet 20 mg  20 mg Oral DAILY    ferrous sulfate tablet 325 mg  325 mg Oral BID WITH MEALS    furosemide (LASIX) tablet 20 mg  20 mg Oral DAILY    tamsulosin (FLOMAX) capsule 0.4 mg  0.4 mg Oral QPM    acetaminophen (TYLENOL) tablet 650 mg  650 mg Oral Q6H PRN    Or    acetaminophen (TYLENOL) suppository 650 mg  650 mg Rectal Q6H PRN    polyethylene glycol (MIRALAX) packet 17 g  17 g Oral DAILY PRN    ondansetron (ZOFRAN ODT) tablet 4 mg  4 mg Oral Q8H PRN    Or    ondansetron (ZOFRAN) injection 4 mg  4 mg IntraVENous Q6H PRN    arformoteroL (BROVANA) neb solution 15 mcg  15 mcg Nebulization BID RT    budesonide (PULMICORT) 500 mcg/2 ml nebulizer suspension  500 mcg Nebulization BID RT    multivitamin, tx-iron-ca-min (THERA-M w/ IRON) tablet 1 Tablet  1 Tablet Oral DAILY    ascorbic acid (vitamin C) (VITAMIN C) tablet 500 mg  500 mg Oral DAILY    Saccharomyces boulardii (FLORASTOR) capsule 250 mg  250 mg Oral BID    heparin 25,000 units in D5W 250 ml infusion  18-36 Units/kg/hr IntraVENous TITRATE        ROS:  Pertinent items are noted in the History of Present Illness. Physical Exam:    Temp (24hrs), Av.9 °F (36.6 °C), Min:97.5 °F (36.4 °C), Max:98.4 °F (36.9 °C)    Visit Vitals  BP (!) 123/50   Pulse 79   Temp 98.4 °F (36.9 °C)   Resp 20   Ht 5' 9\" (1.753 m)   Wt 84.1 kg (185 lb 6.5 oz)   SpO2 99%   BMI 27.38 kg/m²          GEN: WD frail, chronically ill looking,  not in resp distress. HEENT: Unicteric. EOMI intact  CHEST: Non laboured breathing. Skin: Dry and intact. No rash, no redness. wound not seen  CNS: A, OX3. Moves all extremity.  hard of hearing      Microbiology  All Micro Results     Procedure Component Value Units Date/Time    CULTURE, BLOOD [[id number]] Collected: 21    Order Status: Completed Specimen: Blood Updated: 10/05/21 0645     Special Requests: NO SPECIAL REQUESTS        Culture result: NO GROWTH 5 DAYS       CULTURE, BLOOD [[id number]] Collected: 09/30/21 2032    Order Status: Completed Specimen: Blood Updated: 10/05/21 0645     Special Requests: NO SPECIAL REQUESTS        Culture result: NO GROWTH 5 DAYS       CULTURE, WOUND Marifer Alosa STAIN [[id number]]  (Abnormal) Collected: 10/01/21 1115    Order Status: Completed Specimen: Wound Drainage Updated: 10/04/21 1410     Special Requests: NO SPECIAL REQUESTS        GRAM STAIN FEW WBCS SEEN         NO ORGANISMS SEEN        Culture result:       SCANT POSSIBLE STAPHYLOCOCCUS AUREUS            HEAVY DIPHTHEROIDS       CULTURE, URINE [[id number]] Collected: 10/02/21 0618    Order Status: Completed Specimen: Cath Urine Updated: 10/04/21 0811     Special Requests: NO SPECIAL REQUESTS        Culture result: No growth (<1,000 CFU/ML)       INFLUENZA A & B AG (RAPID TEST) [[id number]] Collected: 09/30/21 2052    Order Status: Completed Specimen: Nasopharyngeal from Nasal washing Updated: 09/30/21 2124     Influenza A Antigen Negative        Comment: A negative result does not exclude influenza virus infection, seasonal or H1N1 due to suboptimal sensitivity. If influenza is circulating in your community, a diagnosis of influenza should be considered based on a patients clinical presentation and empiric antiviral treatment should be considered, if indicated. Influenza B Antigen Negative       COVID-19 RAPID TEST [[id number]] Collected: 09/30/21 2052    Order Status: Completed Specimen: Nasopharyngeal Updated: 09/30/21 2122     Specimen source Nasopharyngeal        COVID-19 rapid test Not detected        Comment: Rapid Abbott ID Now       Rapid NAAT:  The specimen is NEGATIVE for SARS-CoV-2, the novel coronavirus associated with COVID-19.        Negative results should be treated as presumptive and, if inconsistent with clinical signs and symptoms or necessary for patient management, should be tested with an alternative molecular assay. Negative results do not preclude SARS-CoV-2 infection and should not be used as the sole basis for patient management decisions. This test has been authorized by the FDA under an Emergency Use Authorization (EUA) for use by authorized laboratories. Fact sheet for Healthcare Providers: ConventionNotis.tvdate.co.nz  Fact sheet for Patients: Chargebackdate.co.nz       Methodology: Isothermal Nucleic Acid Amplification                    Lines / Catheters:  Lab results:    Chemistry  Recent Labs     10/04/21  0520 10/03/21  0555   GLU 92 91    143   K 3.5 3.6    109   CO2 25 29   BUN 33* 38*   CREA 1.49* 1.86*   CA 7.5* 7.7*   AGAP 9 5   BUCR 22* 20   AP 61 57   TP 5.0* 5.5*   ALB 1.8* 1.8*   GLOB 3.2 3.7   AGRAT 0.6* 0.5*       CBC w/ Diff  Recent Labs     10/04/21  0520 10/03/21  0555   WBC 9.4 8.4   RBC 2.93* 2.83*   HGB 7.9* 7.8*   HCT 24.8* 24.8*    193   GRANS 67 75*   LYMPH 11* 12*   EOS 8* 3       Imaging: report posted below as per radiologist     CT A/P- 10/1     Moderate amount of stool in the rectum with perirectal stranding suggesting  stercoral colitis.     2.7 x 1.6 x 3.1 centimeter filling defect in the urinary bladder with  differential diagnoses including blood clot versus bladder mass. Advise urology  consultation.     Colonic diverticulosis without diverticular colitis.     New sclerotic lesion at the S1 level raising the concern for osteoblastic  metastasis versus healing fracture. Advise MRI for further evaluation. nuc med: 10/1  1. Mild persistent but improved hyperemia and soft tissue uptake without  definite osseous uptake to suggest osteoarthritis.

## 2021-10-06 NOTE — PROGRESS NOTES
Hospitalist Progress Note    Patient: Sharyle Furl MRN: [id number]  CSN: [id number]    YOB: 1945  Age: 64 y.o. Sex: male    DOA: 9/30/2021 LOS:  LOS: 1 day                Assessment/Plan     Patient Active Problem List   Diagnosis Code    Hypertension I10    Chronic obstructive pulmonary disease ([facility] J44.9    Chronic renal disease, stage 3, moderately decreased glomerular filtration rate between 30-59 mL/min/1.73 square meter ([facility]) N18.30    Age-related physical debility R54    Chronic respiratory failure with hypoxia ([facility]) J96.11    Tobacco dependence F17.200    Left hip pain M25.552    PAF (paroxysmal atrial fibrillation) ([facility]) I48.0    Avascular necrosis of bone of left hip ([facility]) M87.052    Obesity E66.9    Acute pulmonary edema ([facility]) J81.0    Anemia D64.9    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 02XA    Scrotal edema N50.89    Open wound of left hip S71.002A    Intercondylar fracture of femur ([facility]) S72.413A    Left leg pain M79.605    Open wound of left ankle S91.002A    Infected wound T14. 8XXA, L08.9    Supplemental oxygen dependent Z99.81    MRSA (methicillin resistant staph aureus) culture positive Z22.322    Pyogenic inflammation of bone ([facility]) M86.9    Osteomyelitis of left ankle ([facility]) M86.9    Cellulitis of left ankle L03. 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 male with PMHX hypertension, COPD, CRD, prostate cancer, brain aneurysm, on home O2  due to chronic respiratory failure and medical noncompliance. fracture of left ankle. Multiple admissions to this hospital.  Presents to ER due to falls at home and fever.     Urine clear, d/c renate    LE cellulitis -  Continue with antibiotics    Recent right ankle open wound, cultures growing MRSA   ID and podiatry following. Antibiotics - augmentin, linezolid. Right leg DVT -  on heparin drip     Hematuria -  resolved    hypertension -  continue home medication     Chronic COPD with chronic respiratory failure and oxygen supplementation-  Resume home medication  DuoNebs as needed  Oxygen supplementation     Chronic renal disease stage III -  Avoid nephrotoxins  Creatinine at baseline  Normal electrolytes     Paroxysmal atrial fibrillation -  No acute issues  Resume home medication     Anemia -  Chronic, monitor H/H    Recent Intercondylar fracture of left distal femur -   Conservative management recommended.     GI prophylaxis with pepcid. Prognosis poor with multiple hospital admissions. Disposition : TBD    Review of systems  General: No fevers or chills. Cardiovascular: No chest pain or pressure. No palpitations. Pulmonary: No shortness of breath. Gastrointestinal: No nausea, vomiting. Physical Exam:  General: Awake, cooperative, no acute distress    HEENT: NC, Atraumatic. PERRLA, anicteric sclerae. Lungs: CTA Bilaterally. No Wheezing/Rhonchi/Rales. Heart:  S1 S2,  No murmur, No Rubs, No Gallops  Abdomen: Soft, Non distended, Non tender.  +Bowel sounds,   Extremities:   Psych:   Not anxious or agitated. Neurologic:  No acute neurological deficit.            Vital signs/Intake and Output:  Visit Vitals  BP (!) 115/49   Pulse 64   Temp 98.6 °F (37 °C)   Resp 20   Ht 5' 9\" (1.753 m)   Wt 84.1 kg (185 lb 8 oz)   SpO2 96%   BMI 27.39 kg/m²     Current Shift:  10/06 0701 - 10/06 1900  In: 600 [P.O.:600]  Out: -   Last three shifts:  10/04 1901 - 10/06 0700  In: 840 [P.O.:840]  Out: 1155 [Urine:1155]            Labs: Results:       Chemistry Recent Labs     10/04/21  0520   GLU 92      K 3.5      CO2 25   BUN 33*   CREA 1.49*   CA 7.5*   AGAP 9   BUCR 22*   AP 61   TP 5.0*   ALB 1.8*   GLOB 3.2   AGRAT 0.6*      CBC w/Diff Recent Labs     10/04/21  0520   WBC 9.4   RBC 2.93*   HGB 7.9*   HCT 24.8*      GRANS 67   LYMPH 11*   EOS 8*      Cardiac Enzymes No results for input(s): CPK, CKND1, WILLARD in the last 72 hours. No lab exists for component: CKRMB, TROIP   Coagulation Recent Labs     10/06/21  0508 10/05/21  0207   APTT 108.9* 93.0*       Lipid Panel No results found for: CHOL, CHOLPOCT, CHOLX, CHLST, CHOLV, 898236, HDL, HDLP, LDL, LDLC, DLDLP, 112747, VLDLC, VLDL, TGLX, TRIGL, TRIGP, TGLPOCT, CHHD, CHHDX   BNP No results for input(s): BNPP in the last 72 hours.    Liver Enzymes Recent Labs     10/04/21  0520   TP 5.0*   ALB 1.8*   AP 61      Thyroid Studies Lab Results   Component Value Date/Time    TSH 3.06 04/05/2021 01:30 PM        Procedures/imaging: see electronic medical records for all procedures/Xrays and details which were not copied into this note but were reviewed prior to creation of Plan

## 2021-10-06 NOTE — PROGRESS NOTES
Pt seen. Dewitt inspected. Urine is clear to very light tea colored. No significant gross hematuria on heparin gtt. D/c dewitt cath when ready by hospitalist.  Call with questions.

## 2021-10-06 NOTE — PROGRESS NOTES
Problem: Mobility Impaired (Adult and Pediatric)  Goal: *Acute Goals and Plan of Care (Insert Text)  Description: Initiated 10/2/2021 and to be accomplished within 7 day(s)  1. Patient will move from supine to sit and sit to supine  in bed with supervision/set-up. 2.  Patient will transfer from bed to chair and chair to bed with supervision/set-up using the least restrictive device. 3.  Patient will perform sit to stand with supervision/set-up. 4.  Patient will ambulate with supervision/set-up for 50 feet with the least restrictive device. 5.  Patient will ascend/descend 6 stairs with B handrail(s) with minimal assistance/contact guard assist.      Prior Level of Function:   Patient was recently at [facility] ([address] [place] [address]) and per chart review, pt working with therapists at [facility] - pt only able to take 5 steps w/ RW, mod A. Pt performs STS with min A. Patient lives with wife in a mobile home with 6 [address], B railing. Pt reports he sits on a ledge in the shower to bathe. Pt is extremely [place] -bilateral.       Outcome: Progressing Towards Goal   physical Therapy TREATMENT    Patient: Donis Pantoja (88 y.o. male)  Date: 10/6/2021  Diagnosis: Febrile illness [R50.9]  COPD with acute exacerbation (HCC) [J44.1]  COPD exacerbation (HCC) [J44.1] Febrile illness       Precautions: Fall, Skin, Contact   Chart, physical therapy assessment, plan of care and goals were reviewed. ASSESSMENT:  Pt continues to benefit from skilled intervention. Pt is agreeable and actually enthused to attempt OOB activity. Pt transitions to EOB without assist and is able to complete 4 standing trials. Gait training is accomplished on final standing trial. Limited by line management and fatigue. SOB observed, but maintains 97% + on 2L. Pt will benefit from placement to enhance OOB tolerance and restore independence. Pt agrees with recommendation.      Progression toward goals:  []      Improving appropriately and progressing toward goals  [x] Improving slowly and progressing toward goals  []      Not making progress toward goals and plan of care will be adjusted     PLAN:  Patient continues to benefit from skilled intervention to address the above impairments. Continue treatment per established plan of care. Discharge Recommendations:  Shree Wiley  Further Equipment Recommendations for Discharge:  gait belt and rolling walker     SUBJECTIVE:   Patient stated I think I remember you! Therapy, right? Let's try.     OBJECTIVE DATA SUMMARY:   Critical Behavior:  Neurologic State: Alert  Orientation Level: Oriented X4  Cognition: Appropriate decision making, Appropriate for age attention/concentration, Appropriate safety awareness, Follows commands  Safety/Judgement: Fall prevention  Functional Mobility Training:  Bed Mobility:  Rolling: Stand-by assistance  Supine to Sit: Stand-by assistance  Sit to Supine: Stand-by assistance  Scooting: Stand-by assistance   Transfers:  Sit to Stand: Contact guard assistance;Stand-by assistance  Stand to Sit: Contact guard assistance;Stand-by assistance  Balance:  Sitting: Intact  Sitting - Static: Good (unsupported)  Standing: Impaired; With support  Standing - Static: Fair  Standing - Dynamic : Fair  Ambulation/Gait Training:  Distance (ft): 20 Feet (ft)  Assistive Device: Walker, rolling;Gait belt  Ambulation - Level of Assistance: Contact guard assistance  Gait Abnormalities: Decreased step clearance  Base of Support: Widened  Stance: Right decreased; Left decreased  Speed/[place]: Slow  Step Length: Right shortened;Left shortened  Therapeutic Exercises:       EXERCISE   Sets   Reps   Active Active Assist   Passive Self ROM   Comments   Ankle Pumps 1 20 [x] [] [] []    Quad Sets/Glut Sets   [] [] [] []    Hamstring Sets   [] [] [] []    Short Arc Quads   [] [] [] []    Heel Slides   [] [] [] []    Straight Leg Raises   [] [] [] []    Hip Abd/Add 1 10 [x] [] [] []    Long Arc Quads 1 15 [x] [] [] [] Shortened HS Dread   Seated Marching 1 10 [x] [] [] []    Standing Marching 1 10 [x] [] [] []       [] [] [] []        Pain:  Pain Scale 1: Numeric (0 - 10)  Pain Intensity 1: 0  Activity Tolerance:   Fair  Please refer to the flowsheet for vital signs taken during this treatment.   After treatment:   [] Patient left in no apparent distress sitting up in chair  [] Patient left in no apparent distress in bed  [] Call bell left within reach  [] Nursing notified  [] Caregiver present  [] Bed alarm activated      Serjio Oquendo PTA   Time Calculation: 41 mins

## 2021-10-07 ENCOUNTER — HOME HEALTH ADMISSION (OUTPATIENT)
Dept: HOME HEALTH SERVICES | Facility: HOME HEALTH | Age: 76
End: 2021-10-07
Payer: MEDICARE

## 2021-10-07 VITALS
TEMPERATURE: 98.3 F | DIASTOLIC BLOOD PRESSURE: 49 MMHG | WEIGHT: 185.41 LBS | SYSTOLIC BLOOD PRESSURE: 124 MMHG | OXYGEN SATURATION: 98 % | BODY MASS INDEX: 27.46 KG/M2 | HEART RATE: 70 BPM | RESPIRATION RATE: 19 BRPM | HEIGHT: 69 IN

## 2021-10-07 LAB
APTT PPP: 105 SEC (ref 23–36.4)
APTT PPP: 130.8 SEC (ref 23–36.4)
ERYTHROCYTE [DISTWIDTH] IN BLOOD BY AUTOMATED COUNT: 15.8 % (ref 11.6–14.5)
HCT VFR BLD AUTO: 24.3 % (ref 36–48)
HGB BLD-MCNC: 7.8 G/DL (ref 13–16)
MCH RBC QN AUTO: 27.5 PG (ref 24–34)
MCHC RBC AUTO-ENTMCNC: 32.1 G/DL (ref 31–37)
MCV RBC AUTO: 85.6 FL (ref 78–100)
PLATELET # BLD AUTO: 308 K/UL (ref 135–420)
PMV BLD AUTO: 10.4 FL (ref 9.2–11.8)
RBC # BLD AUTO: 2.84 M/UL (ref 4.35–5.65)
WBC # BLD AUTO: 9.5 K/UL (ref 4.6–13.2)

## 2021-10-07 PROCEDURE — 74011250637 HC RX REV CODE- 250/637: Performed by: HOSPITALIST

## 2021-10-07 PROCEDURE — 85730 THROMBOPLASTIN TIME PARTIAL: CPT

## 2021-10-07 PROCEDURE — 97530 THERAPEUTIC ACTIVITIES: CPT

## 2021-10-07 PROCEDURE — 74011250636 HC RX REV CODE- 250/636: Performed by: INTERNAL MEDICINE

## 2021-10-07 PROCEDURE — 97535 SELF CARE MNGMENT TRAINING: CPT

## 2021-10-07 PROCEDURE — 74011250637 HC RX REV CODE- 250/637: Performed by: INTERNAL MEDICINE

## 2021-10-07 PROCEDURE — 85027 COMPLETE CBC AUTOMATED: CPT

## 2021-10-07 PROCEDURE — 94760 N-INVAS EAR/PLS OXIMETRY 1: CPT

## 2021-10-07 PROCEDURE — 77010033678 HC OXYGEN DAILY

## 2021-10-07 PROCEDURE — 36415 COLL VENOUS BLD VENIPUNCTURE: CPT

## 2021-10-07 PROCEDURE — 74011000250 HC RX REV CODE- 250: Performed by: INTERNAL MEDICINE

## 2021-10-07 PROCEDURE — 94640 AIRWAY INHALATION TREATMENT: CPT

## 2021-10-07 RX ORDER — AMOXICILLIN AND CLAVULANATE POTASSIUM 500; 125 MG/1; MG/1
1 TABLET, FILM COATED ORAL EVERY 12 HOURS
Qty: 6 TABLET | Refills: 0 | Status: SHIPPED | OUTPATIENT
Start: 2021-10-07 | End: 2021-10-10

## 2021-10-07 RX ORDER — LINEZOLID 600 MG/1
600 TABLET, FILM COATED ORAL EVERY 12 HOURS
Qty: 12 TABLET | Refills: 0 | Status: SHIPPED | OUTPATIENT
Start: 2021-10-07 | End: 2021-10-13

## 2021-10-07 RX ADMIN — Medication 250 MG: at 08:32

## 2021-10-07 RX ADMIN — DILTIAZEM HYDROCHLORIDE 30 MG: 30 TABLET, FILM COATED ORAL at 17:16

## 2021-10-07 RX ADMIN — TAMSULOSIN HYDROCHLORIDE 0.4 MG: 0.4 CAPSULE ORAL at 17:16

## 2021-10-07 RX ADMIN — ARFORMOTEROL TARTRATE 15 MCG: 15 SOLUTION RESPIRATORY (INHALATION) at 07:17

## 2021-10-07 RX ADMIN — ACETAMINOPHEN 650 MG: 325 TABLET ORAL at 08:32

## 2021-10-07 RX ADMIN — Medication 1 TABLET: at 08:32

## 2021-10-07 RX ADMIN — Medication 500 MG: at 08:32

## 2021-10-07 RX ADMIN — COLLAGENASE SANTYL: 250 OINTMENT TOPICAL at 08:33

## 2021-10-07 RX ADMIN — LINEZOLID 600 MG: 600 TABLET, FILM COATED ORAL at 08:32

## 2021-10-07 RX ADMIN — DILTIAZEM HYDROCHLORIDE 30 MG: 30 TABLET, FILM COATED ORAL at 08:32

## 2021-10-07 RX ADMIN — FERROUS SULFATE TAB 325 MG (65 MG ELEMENTAL FE) 325 MG: 325 (65 FE) TAB at 17:16

## 2021-10-07 RX ADMIN — BUDESONIDE 500 MCG: 0.5 INHALANT RESPIRATORY (INHALATION) at 07:17

## 2021-10-07 RX ADMIN — AMOXICILLIN AND CLAVULANATE POTASSIUM 1 TABLET: 500; 125 TABLET, FILM COATED ORAL at 08:32

## 2021-10-07 RX ADMIN — HEPARIN SODIUM 15 UNITS/KG/HR: 10000 INJECTION, SOLUTION INTRAVENOUS at 05:46

## 2021-10-07 RX ADMIN — APIXABAN 5 MG: 5 TABLET, FILM COATED ORAL at 12:33

## 2021-10-07 RX ADMIN — FAMOTIDINE 20 MG: 20 TABLET ORAL at 08:32

## 2021-10-07 RX ADMIN — FERROUS SULFATE TAB 325 MG (65 MG ELEMENTAL FE) 325 MG: 325 (65 FE) TAB at 08:32

## 2021-10-07 RX ADMIN — FUROSEMIDE 20 MG: 20 TABLET ORAL at 08:32

## 2021-10-07 RX ADMIN — DILTIAZEM HYDROCHLORIDE 30 MG: 30 TABLET, FILM COATED ORAL at 12:33

## 2021-10-07 NOTE — DISCHARGE INSTRUCTIONS
DISCHARGE SUMMARY from Nurse    PATIENT INSTRUCTIONS:    After general anesthesia or intravenous sedation, for 24 hours or while taking prescription Narcotics:  · Limit your activities  · Do not drive and operate hazardous machinery  · Do not make important personal or business decisions  · Do  not drink alcoholic beverages  · If you have not urinated within 8 hours after discharge, please contact your surgeon on call. Report the following to your surgeon:  · Excessive pain, swelling, redness or odor of or around the surgical area  · Temperature over 100.5  · Nausea and vomiting lasting longer than 4 hours or if unable to take medications  · Any signs of decreased circulation or nerve impairment to extremity: change in color, persistent  numbness, tingling, coldness or increase pain  · Any questions    What to do at Home:  Recommended activity: Activity as tolerated,     If you experience any of the following symptoms , please follow up with . *  Please give a list of your current medications to your Primary Care Provider. *  Please update this list whenever your medications are discontinued, doses are      changed, or new medications (including over-the-counter products) are added. *  Please carry medication information at all times in case of emergency situations. These are general instructions for a healthy lifestyle:    No smoking/ No tobacco products/ Avoid exposure to second hand smoke  Surgeon General's Warning:  Quitting smoking now greatly reduces serious risk to your health.     Obesity, smoking, and sedentary lifestyle greatly increases your risk for illness    A healthy diet, regular physical exercise & weight monitoring are important for maintaining a healthy lifestyle    You may be retaining fluid if you have a history of heart failure or if you experience any of the following symptoms:  Weight gain of 3 pounds or more overnight or 5 pounds in a week, increased swelling in our hands or feet or shortness of breath while lying flat in bed. Please call your doctor as soon as you notice any of these symptoms; do not wait until your next office visit. The discharge information has been reviewed with the patient. The patient verbalized understanding. Discharge medications reviewed with the patient  Patient Education        Chronic Obstructive Pulmonary Disease (COPD): Care Instructions  Overview     Chronic obstructive pulmonary disease (COPD) is a lung disease that makes it hard to breathe. With COPD, the airways that lead to the lungs are narrowed, and the tiny air sacs in the lungs are damaged and lose their stretch. People with COPD have decreased airflow in and out of the lungs, which makes it hard to breathe. The airways also can get clogged with thick mucus. Cigarette smoking is a major cause of COPD. Although there is no cure for COPD, you can slow its progress. Following your treatment plan and taking care of yourself can help you feel better and live longer. Follow-up care is a key part of your treatment and safety. Be sure to make and go to all appointments, and call your doctor if you are having problems. It's also a good idea to know your test results and keep a list of the medicines you take. How can you care for yourself at home? Staying healthy    · Do not smoke. This is the most important step you can take to prevent more damage to your lungs. If you need help quitting, talk to your doctor about stop-smoking programs and medicines. These can increase your chances of quitting for good.     · Avoid colds and other infections. Get the pneumococcal and whooping cough (pertussis) vaccines. If you have had these vaccines before, ask your doctor if you need another dose. Get the flu vaccine every fall. If you must be around people with colds or the flu, wash your hands often.     · Avoid secondhand smoke and air pollution.  Try to stay inside with your windows closed when air pollution is bad. Medicines and oxygen therapy    · Take your medicines exactly as prescribed. Call your doctor if you think you are having a problem with your medicine. You may be taking medicines such as:  ? Bronchodilators. These help open your airways and make breathing easier. They are either short-acting (work for 4 to 9 hours) or long-acting (work for 12 to 24 hours). You inhale most bronchodilators, so they start to act quickly. Always carry your quick-relief inhaler with you in case you need it. ? Corticosteroids (prednisone, budesonide). These reduce airway inflammation. They come in inhaled or pill form.     · Ask your doctor or pharmacist if you are using each type of inhaler correctly. With correct use, the medicine is more likely to get to your lungs.     · See if a spacer is right for you. A spacer may also help you get more inhaled medicine to your lungs. If you use one, ask how to use it properly.     · Do not take any vitamins, over-the-counter medicine, or herbal products without talking to your doctor first.     · If your doctor prescribed antibiotics, take them as directed. Do not stop taking them just because you feel better. You need to take the full course of antibiotics.     · If you use oxygen therapy, use the flow rate your doctor has recommended. Don't change it without talking to your doctor first. Oxygen therapy boosts the amount of oxygen in your blood and helps you breathe easier. Activity    · Get regular exercise. Walking is an easy way to get exercise. Start out slowly, and walk a little more each day.     · Pay attention to your breathing.  You are exercising too hard if you can't talk while you exercise.     · Take short rest breaks when doing household chores and other activities.     · Learn breathing methods--such as breathing through pursed lips--to help you become less short of breath.     · If your doctor has not set you up with a pulmonary rehabilitation program, ask if rehab is right for you. Rehab includes exercise programs, education about your disease and how to manage it, help with diet and other changes, and emotional support. Diet    · Eat regular, healthy meals.     · Use bronchodilators about 1 hour before you eat to make it easier to eat.     · Eat several smaller, frequent meals to prevent getting too full. A full stomach can push on the muscle that helps you breathe (your diaphragm) and make it harder to breathe.     · Drink beverages at the end of the meal.     · Avoid foods that are hard to chew.     · Eat foods that contain protein so you don't lose muscle mass.     · Talk with your doctor if you gain too much weight or if you lose weight without trying. Mental health    · Talk to your family, friends, or a therapist about your feelings. Some people feel frightened, angry, hopeless, helpless, and even guilty. Talking openly about feelings may help you cope. If these feelings last, talk to your doctor. When should you call for help? Call 911 anytime you think you may need emergency care. For example, call if:    · You have severe trouble breathing.     · You are having chest pain that is different or worse than usual.   Call your doctor now or seek immediate medical care if:    · You have new or worse trouble breathing.     · You cough up blood.     · You have a fever.     · You have feelings of anxiety or depression. Watch closely for changes in your health, and be sure to contact your doctor if:    · You cough more deeply or more often, especially if you notice more mucus or a change in the color of your mucus.     · You have new or worse swelling in your legs or belly.     · You are not getting better as expected. Where can you learn more? Go to http://www.gray.com/  Enter B049 in the search box to learn more about \"Chronic Obstructive Pulmonary Disease (COPD): Care Instructions. \"  Current as of: July 6, 2021               Content Version: 13.0  © 2006-2021 [email or web address]. Care instructions adapted under license by [employer] (which disclaims liability or warranty for this information). If you have questions about a medical condition or this instruction, always ask your healthcare professional. [address] any warranty or liability for your use of this information. Patient Education        Chronic Obstructive Pulmonary Disease (COPD) Flare-Ups: Care Instructions  Overview     Chronic obstructive pulmonary disease (COPD) is a lung disease that makes it hard to breathe. It is caused by damage to the lungs over many years, usually from smoking. Chronic bronchitis and emphysema are two lung problems that are types of COPD:  · Chronic bronchitis: The airways that carry air to the lungs (bronchial tubes) get inflamed and make a lot of mucus. This can narrow or block the airways. It can also make you cough. · Emphysema: The tiny air sacs (alveoli) at the end of the airways in the lungs are damaged. When the air sacs are damaged or destroyed, the inner walls break down, and the sacs become larger. These larger air sacs move less oxygen into the blood. This causes difficulty breathing or shortness of breath that gets worse over time. After air sacs are destroyed, they cannot be replaced. Many people with COPD have attacks called flare-ups or exacerbations. This is when your usual symptoms quickly get worse and stay worse. If you notice any problems or new symptoms, get medical treatment right away. Follow-up care is a key part of your treatment and safety. Be sure to make and go to all appointments, and call your doctor if you are having problems. It's also a good idea to know your test results and keep a list of the medicines you take. How can you care for yourself at home? · Be safe with medicines. Take your medicines exactly as prescribed.  Call your doctor if you think you are having a problem with your medicine. You may be taking medicines such as:  ? Bronchodilators. These help open your airways and make breathing easier. ? Corticosteroids. These reduce airway inflammation. They may be given as pills, in a vein, or in an inhaled form. You may go home with pills in addition to an inhaler that you already use. · A spacer may help you get more inhaled medicine to your lungs. Ask your doctor or pharmacist if a spacer is right for you. If it is, ask how to use it properly. · If your doctor prescribed antibiotics, take them as directed. Do not stop taking them just because you feel better. You need to take the full course of antibiotics. · If your doctor prescribed oxygen, use the flow rate your doctor has recommended. Do not change it without talking to your doctor first.  · Do not smoke. Smoking makes COPD worse. If you need help quitting, talk to your doctor about stop-smoking programs and medicines. These can increase your chances of quitting for good. When should you call for help? Call 911 anytime you think you may need emergency care. For example, call if:    · You have severe trouble breathing. Call your doctor now or seek immediate medical care if:    · You have new or worse trouble breathing.     · Your coughing or wheezing gets worse.     · You cough up dark brown or bloody mucus (sputum).     · You have a new or higher fever.     · You have severe chest pain, or chest pain is quickly getting worse. Watch closely for changes in your health, and be sure to contact your doctor if:    · You notice more mucus or a change in the color of your mucus.     · You need to use your antibiotic or steroid pills.     · You do not get better as expected. Where can you learn more? Go to http://www.gray.com/  Enter D989 in the search box to learn more about \"Chronic Obstructive Pulmonary Disease (COPD) Flare-Ups: Care Instructions. \"  Current as of: July 6, 2021               Content Version: 13.0  © 2006-2021 [employer] Care instructions adapted under license by [employer] (which disclaims liability or warranty for this information). If you have questions about a medical condition or this instruction, always ask your healthcare professional. [address] any warranty or liability for your use of this information. Patient Education        Deep Vein Thrombosis: Care Instructions  Overview     A deep vein thrombosis (DVT) is a blood clot in certain veins, usually in the legs, pelvis, or arms. Blood clots in these veins need to be treated because they can get bigger, break loose, and travel through the bloodstream to the lungs. A blood clot in a lung can be life-threatening. The doctor may have given you a blood thinner (anticoagulant). A blood thinner can stop the blood clot from growing larger and prevent new clots from forming. You will need to take a blood thinner for at least 3 months. The doctor has checked you carefully, but problems can develop later. If you notice any problems or new symptoms, get medical treatment right away. Follow-up care is a key part of your treatment and safety. Be sure to make and go to all appointments, and call your doctor if you are having problems. It's also a good idea to know your test results and keep a list of the medicines you take. How can you care for yourself at home? · Take your medicines exactly as prescribed. Call your doctor if you think you are having a problem with your medicine. · If you are taking a blood thinner, be sure you get instructions about how to take your medicine safely. Blood thinners can cause serious bleeding problems. · Try to walk several times a day. · Wear compression stockings if your doctor recommends them. These stockings are tighter at the feet than on the legs. They may reduce pain and swelling in your legs.  But there are different types of stockings, and they need to fit right. So your doctor will recommend what you need. · When you sit, use a pillow to raise the arm or leg that has the blood clot. Try to keep it above the level of your heart. When should you call for help? Call 911 anytime you think you may need emergency care. For example, call if:    · You passed out (lost consciousness).     · You have symptoms of a blood clot in your lung (called a pulmonary embolism). These include:  ? Sudden chest pain. ? Trouble breathing. ? Coughing up blood. Call your doctor now or seek immediate medical care if:    · You have new or worse trouble breathing.     · You are dizzy or lightheaded, or you feel like you may faint.     · You have symptoms of a blood clot in your arm or leg. These may include:  ? Pain in the arm, calf, back of the knee, thigh, or groin. ? Redness and swelling in the arm, leg, or groin. Watch closely for changes in your health, and be sure to contact your doctor if:    · You do not get better as expected. Where can you learn more? Go to http://www.gray.com/  Enter A942 in the search box to learn more about \"Deep Vein Thrombosis: Care Instructions. \"  Current as of: July 6, 2021               Content Version: 13.0  © 4772-0722 [employer]. Care instructions adapted under license by [employer] (which disclaims liability or warranty for this information). If you have questions about a medical condition or this instruction, always ask your healthcare professional. [address] any warranty or liability for your use of this information. and appropriate educational materials and side effects teaching were provided.   ___________________________________________________________________________________________________________________________________  Patient armband removed and shredded

## 2021-10-07 NOTE — DISCHARGE SUMMARY
Discharge Summary    Patient: Amish Gonzalez MRN: [id number]  CSN: [id number]    YOB: 1945  Age: 64 y.o. Sex: male    DOA: 9/30/2021 LOS:  LOS: 2 days   Discharge Date: 10/7/2021     Primary Care Provider:  Merlene Espino MD    Admission Diagnoses: Febrile illness [R50.9]  COPD with acute exacerbation ([facility] [J44.1]  COPD exacerbation ([facility] [J44.1]    Discharge Diagnoses:    Problem List as of 10/7/2021 Date Reviewed: 10/1/2021        Codes Class Noted - Resolved    COPD exacerbation ([facility] ICD-10-CM: J44.1  ICD-9-CM: 491.21  10/5/2021 - Present        DVT (deep venous thrombosis) ([facility] ICD-10-CM: I82.409  ICD-9-CM: 453.40  10/1/2021 - Present        COPD with acute exacerbation ([facility] ICD-10-CM: J44.1  ICD-9-CM: 491.21  9/30/2021 - Present        * (Principal) Febrile illness ICD-10-CM: R50.9  ICD-9-CM: 780.60  9/30/2021 - Present        Cellulitis of left ankle ICD-10-CM: L03.116  ICD-9-CM: 682.6  8/23/2021 - Present        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 8XXA, L08.9  ICD-9-CM: 958.3  8/20/2021 - Present        Supplemental oxygen dependent ICD-10-CM: Z99.81  ICD-9-CM: V46.2  8/20/2021 - Present        MRSA (methicillin resistant staph aureus) culture positive ICD-10-CM: Z22.322  ICD-9-CM: V02.54  8/20/2021 - Present        Pyogenic inflammation of bone (HCC) ICD-10-CM: M86.9  ICD-9-CM: 730.20  8/20/2021 - Present        Osteomyelitis of left ankle (HCC) ICD-10-CM: M86.9  ICD-9-CM: 730.27  8/20/2021 - Present        Intercondylar fracture of femur ([facility] ICD-10-CM: X51.331X  ICD-9-CM: 821.23  7/12/2021 - Present        Left leg pain ICD-10-CM: M79.605  ICD-9-CM: 729.5  7/12/2021 - Present        Open wound of left hip ICD-10-CM: M18.994B  ICD-9-CM: 890.0  5/26/2021 - Present        Scrotal edema ICD-10-CM: N50.89  ICD-9-CM: 608.86  4/23/2021 - Present        Hematoma of left hip ICD-10-CM: S70.02XA  ICD-9-CM: 924.01  4/19/2021 - Present        Acute hip pain ICD-10-CM: M25.559  ICD-9-CM: 719.45  4/18/2021 - Present        Status post total replacement of left hip ICD-10-CM: Z96.642  ICD-9-CM: V43.64  4/14/2021 - Present        Anemia ICD-10-CM: D64.9  ICD-9-CM: 285.9  4/11/2021 - Present        Obesity ICD-10-CM: E66.9  ICD-9-CM: 278.00  4/10/2021 - Present        Acute pulmonary edema ([facility] ICD-10-CM: J81.0  ICD-9-CM: 518.4  4/10/2021 - Present        Avascular necrosis of bone of left hip ([facility] ICD-10-CM: M87.052  ICD-9-CM: 733.42  4/8/2021 - Present        Left hip pain ICD-10-CM: M25.552  ICD-9-CM: 719.45  4/5/2021 - Present        PAF (paroxysmal atrial fibrillation) (HCC) ICD-10-CM: I48.0  ICD-9-CM: 427.31  4/5/2021 - Present        Tobacco dependence ICD-10-CM: F17.200  ICD-9-CM: 305.1  2/21/2020 - Present        Chronic respiratory failure with hypoxia (HCC) ICD-10-CM: J96.11  ICD-9-CM: 518.83, 799.02  8/19/2019 - Present        Age-related physical debility ICD-10-CM: R54  ICD-9-CM: 514  7/8/2019 - Present        Chronic renal disease, stage 3, moderately decreased glomerular filtration rate between 30-59 mL/min/1.73 square meter (HCC) ICD-10-CM: N18.30  ICD-9-CM: 585.3  7/20/2018 - Present        Chronic obstructive pulmonary disease ([facility] ICD-10-CM: J44.9  ICD-9-CM: 496  4/20/2018 - Present        Hypertension ICD-10-CM: I10  ICD-9-CM: 401.9  Unknown - Present        RESOLVED: Cellulitis ICD-10-CM: L03.90  ICD-9-CM: 682.9  4/18/2021 - 4/20/2021        RESOLVED: Avascular necrosis ([facility] ICD-10-CM: M87.00  ICD-9-CM: 733.40  4/8/2021 - 4/8/2021        RESOLVED: Acute respiratory failure ([facility] ICD-10-CM: J96.00  ICD-9-CM: 518.81  2/26/2021 - 4/10/2021        RESOLVED: Respiratory distress ICD-10-CM: R06.03  ICD-9-CM: 786.09  2/2/2021 - 4/10/2021        RESOLVED: COPD exacerbation ([place] [address].) ICD-10-CM: J44.1  ICD-9-CM: 491.21  2/2/2021 - 4/10/2021        RESOLVED: Atrial fibrillation with RVR (HCC) ICD-10-CM: I48.91  ICD-9-CM: 427.31  2/2/2021 - 4/10/2021        RESOLVED: COPD (chronic obstructive pulmonary disease) ([facility] ICD-10-CM: J44.9  ICD-9-CM: 496  8/5/2020 - 4/10/2021        RESOLVED: CAP (community acquired pneumonia) ICD-10-CM: J18.9  ICD-9-CM: 486  8/5/2020 - 4/10/2021        RESOLVED: Advanced care planning/counseling discussion ICD-10-CM: Y66.50  ICD-9-CM: V65.49  Unknown - 4/10/2021        RESOLVED: Hypokalemia ICD-10-CM: E87.6  ICD-9-CM: 276.8  4/14/2020 - 4/10/2021        RESOLVED: Hyponatremia ICD-10-CM: E87.1  ICD-9-CM: 276.1  4/10/2020 - 4/10/2021        RESOLVED: COPD with acute exacerbation ([place] [address].) ICD-10-CM: J44.1  ICD-9-CM: 491.21  4/9/2020 - 4/10/2021        RESOLVED: Acute respiratory failure with hypoxia and hypercarbia (HCC) ICD-10-CM: J96.01, J96.02  ICD-9-CM: 518.81  4/9/2020 - 4/10/2021        RESOLVED: Pleural effusion, right ICD-10-CM: J90  ICD-9-CM: 511.9  2/22/2020 - 4/10/2021        RESOLVED: Hyponatremia ICD-10-CM: E87.1  ICD-9-CM: 276.1  2/21/2020 - 4/10/2021        RESOLVED: Acute on chronic respiratory failure with hypoxia and hypercapnia (HCC) ICD-10-CM: J96.21, J96.22  ICD-9-CM: 518.84, 786.09, 799.02  10/30/2019 - 2/25/2020        RESOLVED: Paroxysmal atrial fibrillation ([facility] ICD-10-CM: I48.0  ICD-9-CM: 427.31  8/19/2019 - 4/10/2021        RESOLVED: Asbestosis ([facility] ICD-10-CM: X28  ICD-9-CM: 933  10/19/2018 - 4/10/2021        RESOLVED: COPD with asthma and status asthmaticus ([facility] ICD-10-CM: J44.9, J45.902  ICD-9-CM: 493.21  7/20/2018 - 2/8/2019        RESOLVED: Status asthmaticus with COPD (chronic obstructive pulmonary disease) ([facility] ICD-10-CM: J44.9, J45.902  ICD-9-CM: 493.21  4/20/2018 - 2/8/2019        RESOLVED: PVC's (premature ventricular contractions) ICD-10-CM: I49.3  ICD-9-CM: 427.69  4/20/2018 - 2/8/2019        RESOLVED: Acute respiratory failure ([facility] ICD-10-CM: J96.00  ICD-9-CM: 518.81  10/2/2014 - 3/18/2017        RESOLVED: URIEL (acute kidney injury) ([facility] ICD-10-CM: N17.9  ICD-9-CM: 584.9  10/2/2014 - 2/8/2019        RESOLVED: Pneumonia ICD-10-CM: J18.9  ICD-9-CM: 707  10/2/2014 - 3/18/2017              Discharge Medications:     Discharge Medication List as of 10/7/2021  4:13 PM      START taking these medications    Details   amoxicillin-clavulanate (AUGMENTIN) 500-125 mg per tablet Take 1 Tablet by mouth every twelve (12) hours for 3 days. , Normal, Disp-6 Tablet, R-0      linezolid (ZYVOX) 600 mg tablet Take 1 Tablet by mouth every twelve (12) hours for 6 days. , Normal, Disp-12 Tablet, R-0      apixaban (ELIQUIS) 5 mg tablet Take 1 Tablet by mouth two (2) times a day., Normal, Disp-60 Tablet, R-0         CONTINUE these medications which have NOT CHANGED    Details   collagenase (SANTYL) 250 unit/gram ointment Apply  to affected area daily. , Normal, Disp-30 g, R-1      arformoteroL (BROVANA) 15 mcg/2 mL nebu neb solution 2 mL by Nebulization route two (2) times a day., No Print, Disp-30 Vial, R-0      budesonide (PULMICORT) 0.5 mg/2 mL nbsp 2 mL by Nebulization route two (2) times a day., No Print, Disp-30 Each, R-0      famotidine (PEPCID) 20 mg tablet Take 1 Tab by mouth daily. , No Print, Disp-30 Tab, R-0      ferrous sulfate 325 mg (65 mg iron) tablet Take 1 Tab by mouth two (2) times daily (with meals). , No Print, Disp-30 Tab, R-0      amLODIPine (NORVASC) 5 mg tablet Take 1 Tab by mouth daily. , Normal, Disp-30 Tab, R-0      albuterol-ipratropium (DUO-NEB) 2.5 mg-0.5 mg/3 ml nebu 3 mL by Nebulization route every four (4) hours as needed for Wheezing., Normal, Disp-30 Nebule, R-0      albuterol (PROVENTIL HFA, VENTOLIN HFA, PROAIR HFA) 90 mcg/actuation inhaler Take 2 Puffs by inhalation every four (4) hours as needed for Wheezing or Shortness of Breath., Normal, Disp-1 Inhaler, R-1      OXYGEN-AIR DELIVERY SYSTEMS 2 L/min by Nasal route continuous. , Historical Med      furosemide (Lasix) 20 mg tablet Take 20 mg by mouth daily. , Historical Med      dilTIAZem (CARDIZEM) 30 mg tablet Take 1 Tab by mouth Before breakfast, lunch, and dinner., Normal, Disp-90 Tab, R-0      acetaminophen (TYLENOL) 325 mg tablet Take 650 mg by mouth every eight (8) hours as needed for Pain., Historical Med      dextran 70/hypromellose (ARTIFICIAL TEARS, PF, OP) Apply 2 Drops to eye as needed for Other (both eyes for dryness). , Historical Med      tamsulosin (FLOMAX) 0.4 mg capsule Take 0.4 mg by mouth every evening., Historical Med         STOP taking these medications       enoxaparin (LOVENOX) 40 mg/0.4 mL Comments:   Reason for Stopping:               Discharge Condition: Good    Procedures : none    Consults: Infectious Disease, Urology and podiatry      PHYSICAL EXAM   Visit Vitals  BP (!) 124/49   Pulse 70   Temp 98.3 °F (36.8 °C)   Resp 19   Ht 5' 9\" (1.753 m)   Wt 84.1 kg (185 lb 6.5 oz)   SpO2 98%   BMI 27.38 kg/m²     General: Awake, cooperative, no acute distress    HEENT: NC, Atraumatic. PERRLA, EOMI. Anicteric sclerae. Lungs:  CTA Bilaterally. No Wheezing/Rhonchi/Rales. Heart:  Regular  rhythm,  No murmur, No Rubs, No Gallops  Abdomen: Soft, Non distended, Non tender. +Bowel sounds,   Extremities: Open wound left posterior ankle. Psych:   Not anxious or agitated. Neurologic:  No acute neurological deficits. Admission HPI :   Bobbi Horton is a 64 y.o.  male who has history of COPD on home oxygen, recent left posterior ankle debridement,  COVID-19 infection September 1 of this year, osteomyelitis of his ankle on chronic doxycycline presents to the emergency room after recently being discharged from [facility] rehab. An attempt to get into his house after a prolonged hospital stay and rehab stay patient fell to the floor. Patient is nonambulatory but it was difficult to get him home.   In the emergency room he was found to be wheezing with high fever and cough. When patient was questioned he denies any pain or discomfort. He agrees to stays in the hospital for further evaluation as he has a fever and has been difficult getting him home patient has been in and out of hospital consistently for several months. Repeat COVID-19 rapid test was negative in our hospital chest x-ray shows no pneumonia there is persistent redness of his lower extremity from his ankle injury dressing was changed where he had open wound with? osteomyelitis patient denies any significant pain. Hospital Course :   Mr. Calista Sheriff was admitted to monitored floor, he was seen and followed by ID, podiatry, urology. He did not had any acute events during hospitalization. LE cellulitis -  Recent right ankle open wound, cultures growing MRSA    Antibiotics - initially started on IV antibiotics, at discharge, ID recommended  augmentin, linezolid.     Right leg DVT -  Started on heparin drip, switched to eliquis at discharge      Hematuria -  Resolved, ok from urology to start anticoagulation  Dewitt removed, voiding after dewitt removed.     hypertension -  continue home medication     Chronic COPD with chronic respiratory failure and oxygen supplementation-  continued home inhalers  DuoNebs as needed  Oxygen supplementation     Chronic renal disease stage III -  Monitored renal function     Paroxysmal atrial fibrillation -  Rate controlled     Anemia -  Chronic, monitored H/H     Recent Intercondylar fracture of left distal femur -   Conservative management recommended. Activity: Activity as tolerated    Diet: Cardiac Diet    Follow-up: PCP, podiatry    Disposition: home with home health    Minutes spent on discharge: 45       Labs: Results:       Chemistry No results for input(s): GLU, NA, K, CL, CO2, BUN, CREA, CA, AGAP, BUCR, TBIL, AP, TP, ALB, GLOB, AGRAT in the last 72 hours.     No lab exists for component: GPT   CBC w/Diff Recent Labs 10/07/21  1230   WBC 9.5   RBC 2.84*   HGB 7.8*   HCT 24.3*         Cardiac Enzymes No results for input(s): CPK, CKND1, WILLARD in the last 72 hours. No lab exists for component: CKRMB, TROIP   Coagulation Recent Labs     10/07/21  1230 10/07/21  0300   APTT 105.0* 130.8*       Lipid Panel No results found for: CHOL, CHOLPOCT, CHOLX, CHLST, CHOLV, 257328, HDL, HDLP, LDL, LDLC, DLDLP, 998698, VLDLC, VLDL, TGLX, TRIGL, TRIGP, TGLPOCT, CHHD, CHHDX   BNP No results for input(s): BNPP in the last 72 hours. Liver Enzymes No results for input(s): TP, ALB, TBIL, AP in the last 72 hours. No lab exists for component: SGOT, GPT, DBIL   Thyroid Studies Lab Results   Component Value Date/Time    TSH 3.06 04/05/2021 01:30 PM            Significant Diagnostic Studies: NM BONE SCAN 3 PHASE    Result Date: 10/1/2021  THREE PHASE BONE SCAN INDICATION:  Chronic open wound posterior left ankle. TECHNIQUE:  24.9 mCi of Technetium-99 MDP was administered IV and three phase imaging of the feet and ankles was performed. COMPARISON: Left foot and ankle radiographs 10/1/2021; prior 3 phase bone scan 8/23/2021. FINDINGS:  Mild residual hyperemia involving the posterior aspects of the distal left leg. This appears improved from prior study. Diminished blood pool uptake noted. On delayed phase imaging, there is no definite localizing activity to the adjacent calcaneus. .     1.  Mild persistent but improved hyperemia and soft tissue uptake without definite osseous uptake to suggest osteoarthritis. XR ANKLE LT MIN 3 V    Result Date: 10/1/2021  EXAM: LEFT ANKLE AND FOOT RADIOGRAPHS CLINICAL INDICATION/HISTORY: fever fall -Additional: None COMPARISON: 8/20/2021 TECHNIQUE: 3 views of the left ankle and foot _______________ FINDINGS: BONES: Diffuse osseous demineralization limits evaluation. No evidence of acute fracture or dislocation. Old avulsion fracture injury of the fifth metatarsal base.  No area of erosion or aggressive-appearing bone destruction. SOFT TISSUES: Unremarkable. _______________     Diffuse osseous demineralization limits evaluation. No obvious displaced fracture or dislocation. XR FOOT LT MIN 3 V    Result Date: 10/1/2021  EXAM: LEFT ANKLE AND FOOT RADIOGRAPHS CLINICAL INDICATION/HISTORY: fever fall -Additional: None COMPARISON: 8/20/2021 TECHNIQUE: 3 views of the left ankle and foot _______________ FINDINGS: BONES: Diffuse osseous demineralization limits evaluation. No evidence of acute fracture or dislocation. Old avulsion fracture injury of the fifth metatarsal base. No area of erosion or aggressive-appearing bone destruction. SOFT TISSUES: Unremarkable. _______________     Diffuse osseous demineralization limits evaluation. No obvious displaced fracture or dislocation. CT ABD PELV WO CONT    Result Date: 10/1/2021  EXAM: CT of the abdomen and pelvis INDICATION: Sepsis COMPARISON: April 18, 2021 TECHNIQUE: Axial CT imaging of the abdomen and pelvis was performed without intravenous contrast. Multiplanar reformats were generated. One or more dose reduction techniques were used on this CT: automated exposure control, adjustment of the mAs and/or kVp according to patient size, and iterative reconstruction techniques. The specific techniques used on this CT exam have been documented in the patient's electronic medical record. Digital Imaging and Communications in Medicine (DICOM) format image data are available to nonaffiliated external healthcare facilities or entities on a secure, media free, reciprocally searchable basis with patient authorization for at least a 12-month period after this study. _______________ FINDINGS: Study is limited by motion artifacts. LOWER CHEST: There is rim calcified loculated right pleural collection unchanged. No focal airspace disease seen. LIVER, BILIARY: Liver is normal. No biliary dilation. Gallbladder is unremarkable.  PANCREAS: Normal. SPLEEN: Normal. ADRENALS: There is a 14 mm right adrenal nodule with Hounsfield units of 7 suggesting an adenoma. Left adrenal is unremarkable. KIDNEYS: Cortical cyst seen in the upper pole of the right kidney. Kidneys demonstrate no hydronephrosis or nephrolithiasis. VASCULATURE: Moderate calcific atherosclerosis present. LYMPH NODES: No enlarged lymph nodes. GASTROINTESTINAL TRACT: Appendix is normal. Colonic diverticulosis present. There is a moderate amount of stool in the rectum with rectal wall thickening and perirectal stranding suggesting stercoral colitis. Darlynn Magic PELVIC ORGANS: Prostate is normal in size. There is a 2.7 x 1.6 x 3.9 cm filling defect in the urinary bladder Prior exam. Differential diagnoses includes bladder mass versus hematoma. Correlate with urinalysis. Advise urology consultation. There is a patulous left internal canal with fat content. BONES: No new sclerotic density is seen at the level of S1 raising the concern for osteoblastic metastasis versus healing fracture. Advise MRI for further evaluation. There is a left hip arthroplasty. There is osteopenia. OTHER: None. _______________     Moderate amount of stool in the rectum with perirectal stranding suggesting stercoral colitis. 2.7 x 1.6 x 3.1 centimeter filling defect in the urinary bladder with differential diagnoses including blood clot versus bladder mass. Advise urology consultation. Colonic diverticulosis without diverticular colitis. New sclerotic lesion at the S1 level raising the concern for osteoblastic metastasis versus healing fracture. Advise MRI for further evaluation. XR PELV 1 OR 2 V    Result Date: 9/30/2021  EXAM: AP pelvis one to 2 views INDICATION: Fall COMPARISON: August 21, 2021 and April 11, 2021 _______________ FINDINGS: There is osteopenia. There is a total left hip arthroplasty without loosening. SI joints and pubic symphysis are intact. No acute fracture or subluxation seen. _______________     No acute fracture or subluxation.  No loosening of the left hip arthroplasty. XR CHEST PORT    Result Date: 9/30/2021  EXAM:  AP Portable Chest X-ray 1 view INDICATION: Fever COMPARISON: July 12, 2021 _______________ FINDINGS:  Heart and mediastinal contours are within normal limits for portable radiograph. Moderate to severe emphysema present. Chronic lung changes and pleural calcifications are seen. Again seen is a teardrop shaped peripherally rim calcified density in the right midlung unchanged. No focal airspace disease. There are no pleural effusions. No acute osseous findings. ________________      Chronic lung changes and pleural calcification. No infiltrates or effusion. DUPLEX LOWER EXT VENOUS BILAT    Result Date: 10/1/2021  · Acute non-occlusive thrombus present in the right proximal femoral vein. · No evidence of deep vein thrombosis in the left lower extremity. No results found for this or any previous visit. Please note that this dictation was completed with slinkset, the computer voice recognition software. Quite often unanticipated grammatical, syntax, homophones, and other interpretive errors are inadvertently transcribed by the computer software. Please disregard these errors. Please excuse any errors that have escaped final proofreading.      CC: Steph Ramos MD

## 2021-10-07 NOTE — PROGRESS NOTES
Problem: Falls - Risk of  Goal: *Absence of Falls  Description: Document Marilu Led Fall Risk and appropriate interventions in the flowsheet. Outcome: Progressing Towards Goal  Note: Fall Risk Interventions:            Medication Interventions: Evaluate medications/consider consulting pharmacy, Bed/chair exit alarm    Elimination Interventions: Bed/chair exit alarm, Call light in reach    History of Falls Interventions: Bed/chair exit alarm         Problem: Patient Education: Go to Patient Education Activity  Goal: Patient/Family Education  Outcome: Progressing Towards Goal     Problem: Pressure Injury - Risk of  Goal: *Prevention of pressure injury  Description: Document Nikos Scale and appropriate interventions in the flowsheet.   Outcome: Progressing Towards Goal  Note: Pressure Injury Interventions:  Sensory Interventions: Assess changes in LOC    Moisture Interventions: Absorbent underpads    Activity Interventions: Pressure redistribution bed/mattress(bed type)    Mobility Interventions: HOB 30 degrees or less    Nutrition Interventions: Document food/fluid/supplement intake, Offer support with meals,snacks and hydration    Friction and Shear Interventions: HOB 30 degrees or less                Problem: Deep Venous Thrombosis - Risk of  Goal: *Absence of deep venous thrombosis signs and symptoms(Stroke Metric)  Outcome: Progressing Towards Goal  Goal: *Absence of impaired coagulation signs and symptoms  Outcome: Progressing Towards Goal  Goal: *Knowledge of prescribed medications  Outcome: Progressing Towards Goal  Goal: *Absence of bleeding  Outcome: Progressing Towards Goal

## 2021-10-07 NOTE — PROGRESS NOTES
1925: Assumed patient care from [address]. Patient is alert and oriented to person, place, time and situation. Respiratory status is stable on 2L via nasal cannula. Vital signs are stable. MEWS score is a one. Patient denies any pain, discomfort, nausea vomiting dizziness or anxiety. White board and fall card is updated. Bed is locked and in lowest position. Call bell, water and personal belongings are within reach. Patient has no questions, comments or concerns after bedside shift report. 0700: Patient had an uneventful shift. Respiratory status, vital signs and MEWS score remained stable. Patient was resting quietly with no signs of distress noted. Bed locked and in lowest position. Call bell water and personal belongings were within reach. Patient had no questions, comments or concerns after bedside shift report.  Bedside report given to Carmell Romberg R.N.

## 2021-10-07 NOTE — PROGRESS NOTES
Physician Progress Note      Nannette La  CSN #:                  [id number]  :                       1945  ADMIT DATE:       2021 8:00 PM  [address] [place] DATE:  RESPONDING  PROVIDER #:        Rosenda Coughlin MD          QUERY TEXT:    Patient admitted with  febrile . Noted documentation of sepsis  in  H&P by hospitalist  progress notes on  . In order to support the diagnosis of sepsis, please include additional clinical indicators in your documentation. Or please document if the diagnosis of sepsis has been ruled out after further study. The medical record reflects the following:  Risk Factors: lower leg cellulitis,  Clinical Indicators:Recent ankle open wound infection on chronic doxycycline with MRSA, cellulitis. WBC WNL, Temp 100.7 . Documented \"Code sepsis was called he is on blood cultures and lactate we will continue with Zosyn for now \"  Treatment: antibiotic    Thank You  Nery Genao RN [address] [place] [address] CRCR  [phone]  Options provided:  -- Sepsis present as evidenced by, Please document evidence. -- Sepsis was ruled out after study  -- Other - I will add my own diagnosis  -- Disagree - Not applicable / Not valid  -- Disagree - Clinically unable to determine / Unknown  -- Refer to Clinical Documentation Reviewer    PROVIDER RESPONSE TEXT:    Sepsis was ruled out after study.     Query created by: Winter Kelly on 10/7/2021 1:20 PM      Electronically signed by:  Rosenda Coughlin MD 10/7/2021 3:19 PM

## 2021-10-07 NOTE — PROGRESS NOTES
Step 1)     Oxygen saturation at ________87_____% on room air at rest.    If less than 88%: STOP! No further documentation needed; Otherwise proceeded to step 2 and 3      Step 2)     Oxygen saturation at  _____________% on room air with exertion     while walking for 6 minutes. Oxygen added to patient.       3)________96______% Rest with O2    Step 4)     Oxygen saturation at _____________% while walking on Oxygen at _____2____LPM      Via:   [x] Nasal Cannula         [] Simple Face Mask          [] Non-Re-Breather Mask        [] Partial Re-Breather Mask          [] Venturi Mask

## 2021-10-07 NOTE — PROGRESS NOTES
DC Plan: home with [facility], O2    Notified by MD that patient is stable for discharge; called wife to inform her that patient will be stretcher transport home with O2. Will verify time and let her know. Discussed home O2 needs with [facility] as patient stated he had sent O2 equipment back - per rep, with patient's frequent admissions for COPD, intubation in Feb this year patient may benefit from Noninvasive respiratory support (ie Astral) - per patient his pulmonologist is Nidia Hicks and this will be shared with [address]. Stretcher transport confirmed for 1700 pickup, wife made aware. [facility] made aware of patient dc by CMS.     Care Management Interventions  Mode of Transport at Discharge: S  Transition of Care Consult (CM Consult): Discharge Planning, [address]: Yes  Health Maintenance Reviewed: Yes  Physical Therapy Consult: Yes  Occupational Therapy Consult: Yes  Support Systems: Spouse/Significant Other  The Plan for Transition of Care is Related to the Following Treatment Goals :  [facility] with physician follow up       The Patient and/or Patient Representative was Provided with a Choice of Provider and Agrees with the Discharge Plan?: Yes  Name of the Patient Representative Who was Provided with a Choice of Provider and Agrees with the Discharge Plan: spouse  Freedom of Choice List was Provided with Basic Dialogue that Supports the Patient's Individualized Plan of Care/Goals, Treatment Preferences and Shares the Quality Data Associated with the Providers?: Yes  Discharge Location  Discharge Placement: Home with home health [facility] [place] with physician follow up  )

## 2021-10-07 NOTE — PROGRESS NOTES
1925: Assumed patient care from [address]. Patient is alert and oriented to person, place, time and situation. Respiratory status is stable on 2L via nasal cannula. Vital signs are stable. MEWS score is a one. Patient denies any pain, discomfort, nausea vomiting dizziness or anxiety. White board and fall card is updated. Bed is locked and in lowest position. Call bell, water and personal belongings are within reach. Patient has no questions, comments or concerns after bedside shift report. 0210: Patient's aPTT was 93.0 which is therapeutic. No rate change at this time. 0700: Patient had an uneventful shift. Respiratory status, vital signs and MEWS score remained stable. Patient was resting quietly with no signs of distress noted. Bed locked and in lowest position. Call bell water and personal belongings were within reach. Patient had no questions, comments or concerns after bedside shift report.  Bedside report given to Tam Chong R.N.

## 2021-10-09 ENCOUNTER — HOME CARE VISIT (OUTPATIENT)
Dept: SCHEDULING | Facility: HOME HEALTH | Age: 76
End: 2021-10-09
Payer: MEDICARE

## 2021-10-09 PROCEDURE — 3331090001 HH PPS REVENUE CREDIT

## 2021-10-09 PROCEDURE — 400013 HH SOC

## 2021-10-09 PROCEDURE — G0299 HHS/HOSPICE OF RN EA 15 MIN: HCPCS

## 2021-10-09 PROCEDURE — 400018 HH-NO PAY CLAIM PROCEDURE

## 2021-10-09 PROCEDURE — 3331090002 HH PPS REVENUE DEBIT

## 2021-10-10 PROCEDURE — 3331090002 HH PPS REVENUE DEBIT

## 2021-10-10 PROCEDURE — 3331090001 HH PPS REVENUE CREDIT

## 2021-10-11 ENCOUNTER — HOME CARE VISIT (OUTPATIENT)
Dept: SCHEDULING | Facility: HOME HEALTH | Age: 76
End: 2021-10-11
Payer: MEDICARE

## 2021-10-11 ENCOUNTER — HOME CARE VISIT (OUTPATIENT)
Dept: HOME HEALTH SERVICES | Facility: HOME HEALTH | Age: 76
End: 2021-10-11
Payer: MEDICARE

## 2021-10-11 VITALS
OXYGEN SATURATION: 96 % | TEMPERATURE: 98.5 F | HEART RATE: 95 BPM | SYSTOLIC BLOOD PRESSURE: 122 MMHG | RESPIRATION RATE: 16 BRPM | DIASTOLIC BLOOD PRESSURE: 59 MMHG

## 2021-10-11 PROCEDURE — 3331090002 HH PPS REVENUE DEBIT

## 2021-10-11 PROCEDURE — G0151 HHCP-SERV OF PT,EA 15 MIN: HCPCS

## 2021-10-11 PROCEDURE — 3331090001 HH PPS REVENUE CREDIT

## 2021-10-11 NOTE — Clinical Note
Mohsen Pulliam is a 64 y.o. male referred with a dx of COPD. Pt's wife reports he has been in/out of the hospital and rehab since July. 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 He does not have any f/u apt's scheduled and is unable to ambulate stairs to leave home. Plan: 2W4, 1W1.    Therapy Functional Score Assessment  Question   Score   Grooming  3       Upper Dressing 3      Lower Dressing 3      Bathing  5      Toilet Transfer  2    Transfer  2            Ambulation  3   Dyspnea                     4       Pain Interfering with activity 3  Est number therapy visits      9

## 2021-10-12 ENCOUNTER — HOME CARE VISIT (OUTPATIENT)
Dept: SCHEDULING | Facility: HOME HEALTH | Age: 76
End: 2021-10-12
Payer: MEDICARE

## 2021-10-12 ENCOUNTER — HOME CARE VISIT (OUTPATIENT)
Dept: HOME HEALTH SERVICES | Facility: HOME HEALTH | Age: 76
End: 2021-10-12
Payer: MEDICARE

## 2021-10-12 VITALS
RESPIRATION RATE: 16 BRPM | OXYGEN SATURATION: 95 % | SYSTOLIC BLOOD PRESSURE: 130 MMHG | DIASTOLIC BLOOD PRESSURE: 60 MMHG | TEMPERATURE: 96.8 F | HEART RATE: 94 BPM

## 2021-10-12 PROCEDURE — G0495 RN CARE TRAIN/EDU IN HH: HCPCS

## 2021-10-12 PROCEDURE — 3331090001 HH PPS REVENUE CREDIT

## 2021-10-12 PROCEDURE — 3331090002 HH PPS REVENUE DEBIT

## 2021-10-12 NOTE — CASE COMMUNICATION
Call placed to Dr Saba Kent office by [facility] today, trying to get the patient seen before his 11/22/21 PCP visit. Patient's bilateral feet and legs are +4 with pitting edema today and the patient refuses to put his feet up and elevate. SN and his wife tried to get him to elevate both legs and he would not do it. Patient also has pressure areas to his buttocks and they need to be evaluated for wound care orders. Dr Saba Kent office informed SN that the patient is supposed to be seen tomorrow in the office at 11;30 am, the patient's wife was unaware of this appt and SN called her several times to tell her, left voicemails and even sent her a text, with no call back. Unable to reach anyone about this visit tomorrow. The patient is very non-compliant and it is difficult it reach anyone at times. Case communication with Merna Garibay, Arlin Gonzalez, PT ad OT about concerns with patient.

## 2021-10-12 NOTE — HOME HEALTH
S: Pt stated he could hear and I would have to ask his wife questions. Pt can only find one hearing aide. O: PAIN: Pt reports pain in legs when standing and walking, see pain tab. WOUND: See RN notes for wounds on LLE, BLE edema is severe. Pt/cg instructed to keep legs elevated at all times when not walking or sitting up for meals. ROM: Severe forward head and rounded shoulder posture with thoracic kyphosis. STRENGTH:Patient demonstrates decreased muscle strength as follows:  R hip flex 3-/5  R hip abd 3/5  R hip add 3/5  R hip ext 3-/5  R knee flex 3/5  R knee ext 3-/5  R ankle DF 2/5  L hip flex 3-/5  L hip abd 3/5  L hip ext 3-/5  L hip add 3/5  L knee flex 3-/5  L knee ext 3-/5  L ankle DF 2/5  FTSTS failed due to pt needing assistance for transfers   BED MOBILITY: Pt sleeps in his recliner and requires mod-max A for mobility   EQUIPMENT:FWW, quad cane, O2 2L continuous  TRANSFERS: patient requires Max A from recliner and uses BUE to push up and significant support of back of legs on couch for support and balance upon standing, has a wide base of support and requires AD for initial standing balance. GAIT: patient ambulating <10 ft with Mod A using a FWW. Patient demonstrates the following gait deficits:forward flexed posture with COG anterior to ALEXIA, antalgic gait with poor foot clearance BLE and unsteady gait. Patient requires VC's for use of his walker at all times to improve safety and decrease risk of falling. STEPS: there are/is 4 to enter home. Pt is unable to do steps and was carried into the home by medical transport. BALANCE: Tinetti 2/28 and TUG (failed) seconds - high fall risk due to reduced strength, gait deviations and decreased efficiency of balance reactions and pain. Patient demonstrates poor use and activation of ankle and hip strategy during standing balance testing and activities.     A:ASSESSMENT AND PROGRESS TOWARD GOALS:  Patient demonstrated a positive result to therapy this date as evidenced by an improvement in gait pattern with cues, an understanding of his fall risk and agreement to not walk alone,  and patient expressing an understanding of the rehab plan due to therapy verbal and written instructions. Goals established for increased independence in the home, safe mobility in the home, improvement in strength and balance all designed to reduce fall risk and progress toward independence. Patient will benefit from continued PT intervention to progress toward meeting all established goals     P:2W4, 1W1      PMH/Summary of clinical condition: Joe Andrea is a 64 y.o. male referred with a dx of COPD. Pt's wife reports he has been in/out of the hospital and rehab since July. 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 He does not have any f/u apt's scheduled and is unable to ambulate stairs to leave home. Medications reconciled. No changes in medications at this time. Medications are effective at this time. Social history/ caregivers: lives with his wife and daughter in a mobile home with 4 steps to enter. He requires assistance from all caregivers for ADL's, IADL's, medication management and transportation. Pt/Caregiver instructed on plan of care and are agreeable to plan of care at this time. Plan of care and admission to home health status reported to attending Melanie Malloy MD   Discharge planning discussed with patient and caregiver. Discharge planning as follows: DC to self and family under MD supervision when all goals are met or maximum potential is reached  Pt/Caregiver did verbalize understanding.   Patient education provided this visit: safety, fall risk, HEP, need for assistance at all times with transfers and ambulation  Home exercise program: 1. always have someone with her for assistance when transferring or ambulating   2. stand and walk short distances every hour during the day to increase strength, provide pressure relief and increase independence with transfers  3. Patient/CG instructed in a  HEP as follows: COPD prototocol: pursed lipped breathing x 4, toe taps and LAQ x 30 reps 2 times daily. Continued need for the following skills: MD referral for HHPT following recent hospital stay. HHPT is medically necessary to address  dx and clinical findings: decreased ROM, decreased strength, impaired gait, decreased ability w stair negotiation, increased swelling, decreased transfer status, decreased endurance, decreased balance and decreased safety, increased pain in order to improve functional mobility/quality of life, decrease burden of care, reduce risk for re-hospitalization, work towards patient's personal goals of return to PLOF w decrease risk for falls.

## 2021-10-13 ENCOUNTER — HOME CARE VISIT (OUTPATIENT)
Dept: HOME HEALTH SERVICES | Facility: HOME HEALTH | Age: 76
End: 2021-10-13
Payer: MEDICARE

## 2021-10-13 VITALS
SYSTOLIC BLOOD PRESSURE: 115 MMHG | HEART RATE: 83 BPM | OXYGEN SATURATION: 97 % | TEMPERATURE: 97.8 F | DIASTOLIC BLOOD PRESSURE: 62 MMHG | RESPIRATION RATE: 20 BRPM

## 2021-10-13 PROCEDURE — 3331090001 HH PPS REVENUE CREDIT

## 2021-10-13 PROCEDURE — 3331090002 HH PPS REVENUE DEBIT

## 2021-10-13 NOTE — HOME HEALTH
Skilled reason for visit: Patient was recently hospitalized for  COPD, requiring observation by a SN for s/s of decomposition or adverse effects resulting from newly prescribed medications. Skilled observation needed to determine if new medication regimen prescribed requires modifications or other therapeutic interventions considered until pt's clinical condition or treatment has stabilized. Caregiver involvement:  Patient's caregiver is his spouse. Caregiver assists patient with bathing, dressing, walking, bathroom, meal prep and setup, medication management, grocery shopping, household chores, transportation to MD appointment and home exercise program.  Medications reconciled and all medications are available in the home this visit. The following education was provided regarding medications, medication interactions, and look alike medications:  furosemide (Lasix) 20 mg tablet - Contact Agency or MD with questions. Medications are effective at this time. Patient states understanding. Patient education provided this visit:  . ISADORA Luna Disease Management: COPD, edema    teaching, pain management, constipation prevention, fall precautions, s/s of infection, deep breathing exercises. Goals/teaching progressing. Patient's goal is to breathe better. Patient has remained free from falls, free from infection; no safety concerns at this time and is ambulating independently with. SN to complete education of patient and patient to follow up with Dr Neli Lock exercise program: PT  Continued need for the following skills: Nursing, PT   Patient and/or caregiver notified and agree to changes in the Plan of Care: No changes    The following discharge planning was discussed with the pt/caregiver:  SN to continue education of patient and discharge patient when teaching and goals are met. Call placed to Dr Gino Martinez office by [facility], trying to get the patient seen before his 10/22/21 PCP visit.  Patient's bilateral feet and legs are +4 with pitting edema today and the patient refuses to put his feet up and elevate. SN and his wife tried to get him to elevate both legs and he would not do it. Patient also has pressure areas to his buttocks and they need to be evaluated for wound care orders. Dr Faye Dias office informed SN that the patient is supposed to be seen tomorrow in the office at 11;30 am, the patient's wife was unaware of this appt and SN called her several times to tell her, left voicemails and even sent her a text, with no call back. Unable to reach anyone about this visit tomorrow. The patient is very non-compliant and it is difficult it reach anyone at times. Case communication with Katie Evans, Luther Lopez, PT ad OT about concerns with patient. No call was ever received back from patient;s wife about MD appt tomorrow. Messages and texts sent, no call back.

## 2021-10-13 NOTE — PROGRESS NOTES
Patient was seen on 10-1-2021 for left leg ulcer with exposed Achilles tendon. Wound cultures were taken due to slight drainage, but no cellulitis was noted. Only the Stravix graft from August surgery.

## 2021-10-13 NOTE — HOME HEALTH
Skilled services/Home bound verification:   Skilled Reason for admission/summary of clinical condition:  COPD exacerbation requiring disease process teaching and medication management, possible wound management . This patient is homebound for the following reasons Requires considerable and taxing effort to leave the home  and Requires the assistance of 1 or more persons to leave the home . Caregiver: spouse. Caregiver assists with iadls, adls, meal prep, med management, taking to md appointments. Medications reconciled and all medications are not available in the home this visit. pt taking tramadol, gabapentin. cg stating eliquis was too expensive, did not fill and will discuss with pcp on next visit- sn to notify MD. pt not taking duo-neb, santyl or tylenol. The following education was provided regarding medications, medication interactions, and look alike medications (specify): reviewed side effects, purposes, dosage, frequencies. Medications  are effective at this time. High risk medication teaching regarding anticoagulants, hyperglycemic agents or opiod narcotics performed (specify) tramadol-reviewed side effects, purposes, dosage, frequencies. MD notified of any discrepancies/medication interactions- tramadol and zyvox; gabapentin and ultram; cardizem and flomax, MD office called and no return call. Home health supplies by type and quantity ordered/delivered this visit include: sn will order supplies once wound care orders are obtained. Patient education provided this visit to include: patient/cg instructed to monitor for edema/increase in edema, to elevate extremity when edema occurs and to notify md if edema exceeds normal limits for patient, pitting edema noted to bilateral lower extremities, pt/cg reporting baseline and that it has improved since hospital-SN to monitor and instructed patient on importance of elevating which is reporting he is not doing at this time.  patient made aware to monitor for s/s of infection [increased swelling, increased redness around site, increased pain, foul smelling drainage, fever] aware who to report to/when. pt aware to keep dressing clean, dry and intact as ordered. wound noted to left posterior calf- removed dressing and assessed- cleansed with saline and replaced with foam until orders are obtained. wound noted to right buttocks, patient has protective barrier and cg is using at this time until orders are obtained. sn called MD office to notify of left posterior calf wound and right buttocks pressure ulcer, requesting wound care with recommendations for aquacel and foam dressings to both. sn left message with dr. Dave Ngo staff, awaiting returned call with wound care orders (10/11) *sn checked chart and noted MD will not give orders until MD sees wounds, referring to wound clinic- no returned call for SN from office. encouraged patient to get three nutritional meals daily and to stay hydrated. patient instructed to follow copd diet- patient needing a well balanced diet with the 5 food groups, patient to increase fiber in diet, fresh fruits and vegetables, whole grains and increase water intake. patient to limit sodium intake, read nutritional labels and monitor sodium content. reviewed foods to avoid, how to order foods when eating out, how to read nutrition labels and measure sodium intake. discussed importance of monitoring blood pressure daily and recording for review, discussed hypertension, causes/long term effects of uncontrolled hypertension. discussed afib and how to take radial pulse for 1 minute and to notify MD of any heart rate changes.  also notified patient of heart healthy diet- instructed patient to reduce intake of saturated and fatty acids (butter, creams, red meats, fried foods, margarines, high fat baked goods, shortening, cheeses, etc) and to increase daily fresh fruits and vegetables and whole grains, encouraged to avoid canned and processed foods as much as possible. pt aware to use oxygen therapy as prescribed by MD. patient instructed to be very careful when ambulating around oxygen tubing to prevent any falls from occurring. discussed fall precautions in detail- having lighted hallways, removing throw rugs, monitoring medication that may alter mental status. patient made aware to turn every 2 hours and to keep pressure off of bony prominences, to monitor for any pressure ulcer development/worsening. pt denies any questions or concerns at this time. Patient/caregiver degree of understanding: cg- partial understanding. pt- poor. Home exercise program/Homework provided: patient instructed to perform sob hep 4-5 x daily and prn for sob, to promote lung expansion. pt also encouraged to use ICS q 2 hours and to perform sob hep during therapy. patient instructed to perform incontinence hep 3-4 x daily to strengthen muscles and to perform timed voiding q 2 hours to prevent incontinence from occurring. Pt/Caregiver instructed on plan of care and are agreeable to plan of care at this time. Physician Dr. Michael Barrett notified of patient admission to home health and plan of care including anticipated frequency of 1w1, 2w2, 1w5, 2 prn and treatments/interventions/modalities of disease process teaching and medication management, wound care. Discharge planning discussed with patient and caregiver. Discharge planning as follows: Patient will be discharged once education has completed, patient is medically stable and pt/cg are able to independently manage wound care/ wound has healed or no longer requires skilled care. Pt/Caregiver did verbalize understanding of discharge planning. Next MD appointment 10/13/21 (date) with Dr. Michael Barrett. Patient/caregiver encouraged/instructed to keep appointment as lack of follow through with physician appointment could result in discontinuation of home care services for non-compliance.

## 2021-10-14 ENCOUNTER — HOME CARE VISIT (OUTPATIENT)
Dept: SCHEDULING | Facility: HOME HEALTH | Age: 76
End: 2021-10-14
Payer: MEDICARE

## 2021-10-14 PROCEDURE — 3331090002 HH PPS REVENUE DEBIT

## 2021-10-14 PROCEDURE — G0157 HHC PT ASSISTANT EA 15: HCPCS

## 2021-10-14 PROCEDURE — G0152 HHCP-SERV OF OT,EA 15 MIN: HCPCS

## 2021-10-14 PROCEDURE — 3331090001 HH PPS REVENUE CREDIT

## 2021-10-14 NOTE — PROGRESS NOTES
Physician Progress Note      Nannette La  CSN #:                  [id number]  :                       1945  ADMIT DATE:       2021 8:00 PM  [address] [place] DATE:        10/7/2021 5:32 PM  RESPONDING  PROVIDER #:        Rosenda Coughlin MD          QUERY TEXT:    Patient presented with wheezing, cough and shortness of breath. They were noted to have a COPD exacerbation. Patient was noted to have a prior history of,\"COPD with asthma and status asthmaticus. \"Based on clinical indicators and treatment, can the patient's respiratory condition be further specified as: The medical record reflects the following:  Risk Factors: SOB, cough, wheezing  Clinical Indicators: presented with wheezing, cough and shortness of breath. COPD and of prior history of,\"COPD with asthma and status asthmaticus. \"    Treatment: treated with IV Solumedrol, Prednisone, Duonebs, Brovana and  Pulmicort with improvement of presenting symptoms. Thank You  Nery Genao RN ,[facility], CRCR  [phone]  Options provided:  -- COPD exacerbation  -- Asthma exacerbation  -- Both COPD exacerbation and asthma exacerbation  -- Other - I will add my own diagnosis  -- Disagree - Not applicable / Not valid  -- Disagree - Clinically unable to determine / Unknown  -- Refer to Clinical Documentation Reviewer    PROVIDER RESPONSE TEXT:    This patient is being treated for COPD exacerbation.     Query created by: Winter Kelly on 10/13/2021 7:26 AM      Electronically signed by:  Rosenda Coughlin MD 10/14/2021 8:10 AM

## 2021-10-14 NOTE — PROGRESS NOTES
Physician Progress Note      Melissa Roland  CSN #:                  [id number]  :                       1945  ADMIT DATE:       2021 8:00 PM  [address] [place] DATE:        10/7/2021 5:32 PM  RESPONDING  PROVIDER #:        Vahe Montez MD          QUERY TEXT:    Patient was noted to have cellulitis documented in discharge summary , However . Per DR Nadira Mitchell in progress not 10/13 \" no cellulitis was noted \". After study, can the cellulitis be further specified as: The medical record reflects the following:  ?? Risk Factors: cellulitis of the lower extremity  ? ? Clinical Indicators: Patient was noted to have cellulitis of the lower extremity. They are status post a recent left  posterior ankle debridement and have been on antibiotics for osteomyelitis of the ankle. Progress  notes stated, \"Persistent redness of his lower extremity from his ankle injury  dressing was changed where he had open wound. \" Podiatry noted, \"Being seen for chronic open  wound left posterior ankle at the Achilles' tendon. He was admitted here about 6 weeks ago with  the same problem, and I took him to the OR for debridement and Grafting, that is still intact and  looks good. Chronic open wound left Achilles' Tendon with Stravix graft intact. Rule out infection  from drainage. \"  ?? Treatment: status post a recent left  posterior ankle debridement and have been on antibiotics    Thank You  Frank Boykin RN [facility] CRCR  [phone]  Options provided:  -- Cellulitis due to recent debridement procedure  -- Other - I will add my own diagnosis  -- Disagree - Not applicable / Not valid  -- Disagree - Clinically unable to determine / Unknown  -- Refer to Clinical Documentation Reviewer    PROVIDER RESPONSE TEXT:    Provider is clinically unable to determine a response to this query.     Query created by: Elizabeth Cartwright on 10/14/2021 10:05 AM      Electronically signed by:  Vahe Montez MD 10/14/2021 4:39 PM

## 2021-10-15 VITALS
RESPIRATION RATE: 16 BRPM | SYSTOLIC BLOOD PRESSURE: 120 MMHG | DIASTOLIC BLOOD PRESSURE: 60 MMHG | OXYGEN SATURATION: 98 % | TEMPERATURE: 98.2 F | HEART RATE: 88 BPM

## 2021-10-15 PROCEDURE — 3331090002 HH PPS REVENUE DEBIT

## 2021-10-15 PROCEDURE — 3331090001 HH PPS REVENUE CREDIT

## 2021-10-15 NOTE — HOME HEALTH
Summary of clinical condition:  Pt was seen in ED on 9/30/2021 with COPD exacerbation. He had a fever and cough. He was negative for COVID and was placed on antibiotics. 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 No adverse reactions noted. Caregiver involvement: Pt lives with his wife and adult daughter who assist with ADLs, IADLs, and transfers. Patient education provided this visit:  Pt and his wife were educated about tub transfer bench and toilet rails. The were instructed how to raise his recliner for safer sit to stand. Home exercise program:  Pt was trained in HEP of UE exercises including shoulder flexion, straight arm clap in front x 10. He was instructed to perform daily. ASSESSMENT:   Pt has decreased transfers for recliner, commode, bed, and shower. He has decreased ability to dress LE and needs WATT for UE dressing. Pt needs assistance for sponge bathing and is unable to shower with help. Pt has decreased ROM in his shoulders that makes his ADLs more difficult. Pt does not understand energy conservation techniques.   Pt will be seen for environmental adaptation education, transfer training, ADL training, and UE thera  ex. Patient Verbalized Goals:  Pt wants to sleep in his bed. then discharge when goals are met. Continued need for the following skills: Occupational Therapy  Pt/Caregiver instructed on plan of care and are agreeable to plan of care at this time. Discharge planning discussed with patient and caregiver. Discharge planning as follows: Pt will be seen 1 x week x 1, 2 x week x 2, 1 x week x 1. Pt/Caregiver did verbalize understanding.

## 2021-10-16 PROCEDURE — 3331090001 HH PPS REVENUE CREDIT

## 2021-10-16 PROCEDURE — 3331090002 HH PPS REVENUE DEBIT

## 2021-10-17 VITALS
OXYGEN SATURATION: 98 % | TEMPERATURE: 98.5 F | SYSTOLIC BLOOD PRESSURE: 131 MMHG | DIASTOLIC BLOOD PRESSURE: 72 MMHG | HEART RATE: 80 BPM

## 2021-10-17 PROCEDURE — 3331090001 HH PPS REVENUE CREDIT

## 2021-10-17 PROCEDURE — 3331090002 HH PPS REVENUE DEBIT

## 2021-10-18 PROCEDURE — 3331090001 HH PPS REVENUE CREDIT

## 2021-10-18 PROCEDURE — 3331090002 HH PPS REVENUE DEBIT

## 2021-10-18 NOTE — HOME HEALTH
SUBJECTIVE: I'm not hurting anywhere  CAREGIVER ASSISTANCE NEEDED FOR: transportation  ASSESSMENT AND PROGRESS TOWARD GOALS:  Patient demonstrated a positive result to therapy this date as evidenced by tolerating gait training and seated therex. Progressing fair toward goals for gait and strength. Patient continues to have deficits in gait, strength, and endurace. Patient will benefit from continued intervention with progression of gait training and therex. Significant B foot edema noted this session. Pt educated edema reduction techniques- use of compression stockings, elevating BLEs throughout the day, ankle pumps every hour. CONTINUED NEED FOR THE FOLLOWING SKILLS:  PT is medically necessary to address pain, decreased ROM, decreased strength, increased swelling, impaired bed mobility, decreased independence with functional transfers, impaired gait, impaired stair negotiation, and impaired balance in order to improve functional independence, quality of life, return to PLOF, reduce the risk for falls, and reduce pain.    [facility]  LAST COMPLETED ON:  10/2/21  PLAN: 2w1   DISCHARGE PLANNING DISCUSSED: Discharge to self and family under MD supervision once all goals have been met or patient has reached maximum potential.

## 2021-10-19 ENCOUNTER — HOME CARE VISIT (OUTPATIENT)
Dept: SCHEDULING | Facility: HOME HEALTH | Age: 76
End: 2021-10-19

## 2021-10-19 ENCOUNTER — HOME CARE VISIT (OUTPATIENT)
Dept: SCHEDULING | Facility: HOME HEALTH | Age: 76
End: 2021-10-19
Payer: MEDICARE

## 2021-10-19 VITALS
HEART RATE: 100 BPM | DIASTOLIC BLOOD PRESSURE: 69 MMHG | RESPIRATION RATE: 22 BRPM | SYSTOLIC BLOOD PRESSURE: 132 MMHG | TEMPERATURE: 97.5 F | OXYGEN SATURATION: 97 %

## 2021-10-19 PROCEDURE — 3331090001 HH PPS REVENUE CREDIT

## 2021-10-19 PROCEDURE — 3331090002 HH PPS REVENUE DEBIT

## 2021-10-19 PROCEDURE — G0299 HHS/HOSPICE OF RN EA 15 MIN: HCPCS

## 2021-10-19 NOTE — CASE COMMUNICATION
SN discipline discharge for non-compliance issues. SN has tried to reach the patient over the last week, with no answer and no calls back from messages left. Today I did a drive by to see the patient, since I cannot reach anyone, after calling the wife and the daughter multiple times and leaving messages again. Arrived to the home and explained I'd been trying to reach someone for a week. The wife states her cell is not working properly, I let her know I also called the daughter and left messages, no response to why no response from the daughter either. The patient was coming out of the bathroom when I entered the home, he was extremley SOB, wheezing, hard time breathing, with his O2 off. His bilateral feet and legs are still at a +4 pitting edema, his wife reports that the patient refuses to elevate his legs and wear his oxygen, he's had it off since yesterday and demands fluid to drink all the time, not watching his fluid restrictions. SN tried again to talk to the patient, do teaching, he refuses to listen and cooperate.  informed the patient and his wife that he was being discharged for non-compliance. Dr Anna Wright office was called and I spoke to his nurse Susana Irwin and made then aware I am discharging the patient for non-compliance issues. She stated the patient did not show up for his MD appt last week with DR Claire Kenny, as scheduled, they are very aware of his non-compliance issues. Case communication with Dr Claire Kenny, PT and OT.

## 2021-10-19 NOTE — HOME HEALTH
Skilled reason for visit: Patient was recently hospitalized for COPD, requiring observation by a SN for s/s of decomposition or adverse effects resulting from newly prescribed medications. Skilled observation needed to determine if new medication regimen prescribed requires modifications or other therapeutic interventions considered until pt's clinical condition or treatment has stabilized. Caregiver involvement: Patient lives with wife. Caregiver assists patient with meal prep and setup, medication management, grocery shopping, household chores, transportation to MD appointment and home exercise program.  Medications reconciled and all medications are available in the home this visit. The following education was provided regarding medications, medication interactions, and look alike medications:  Report medication questions or problems to [address] or MD.  Medications are effective currently. Patient states understanding. Patient education provided this visit:  Reviewed all meds with patient on discharge, all questions addressed and answered. Progress toward goals: Goals/teaching not completed. Patient refused further care and services. Patient to follow up with PCP  Home exercise program: PT  Continued need for the following skills: PT  Patient and/or caregiver notified and agrees to changes in the Plan of Care YES  The following discharge planning was discussed with the pt/caregiver: SN discharge due to non-compliance  Patient states understanding of patient's meds, precautions and interactions      SN discipline discharge for non-compliance issues. SN has tried to reach the patient over the last week, with no answer and no calls back from messages left. Today I did a drive by to see the patient, since I cannot reach anyone, after calling the wife and the daughter multiple times and leaving messages again. Arrived to the home and explained I'd been trying to reach someone for a week.  The wife states her cell is not working properly, I let her know I also called the daughter and left messages, no answer as to why no response from the daughter either. The patient was coming out of the bathroom when I entered the home, he was extremely SOB, wheezing, hard time breathing, with his O2 off. His bilateral feet and legs are still at a +4 pitting edema, his wife reports that the patient refuses to elevate his legs and wear his oxygen, he's had it off since yesterday and demands fluid to drink all the time, not watching his fluid restrictions. SN tried again to talk to the patient, do teaching, he refuses to listen and cooperate. SN informed the patient and his wife that he was being discharged for non-compliance. Dr Omi Harris office was called and I spoke to his nurse Porter Sanchez and made them aware I am discharging the patient for non-compliance issues. She stated the patient did not show up for his MD appt last week with Dr Jeniffer Bertrand, as scheduled, they are very aware of his non-compliance issues. Case communication with Dr Jeniffer Bertrand, PT and OT.

## 2021-10-20 ENCOUNTER — HOME CARE VISIT (OUTPATIENT)
Dept: SCHEDULING | Facility: HOME HEALTH | Age: 76
End: 2021-10-20
Payer: MEDICARE

## 2021-10-20 PROCEDURE — 3331090001 HH PPS REVENUE CREDIT

## 2021-10-20 PROCEDURE — G0152 HHCP-SERV OF OT,EA 15 MIN: HCPCS

## 2021-10-20 PROCEDURE — 3331090002 HH PPS REVENUE DEBIT

## 2021-10-21 ENCOUNTER — HOME CARE VISIT (OUTPATIENT)
Dept: HOME HEALTH SERVICES | Facility: HOME HEALTH | Age: 76
End: 2021-10-21
Payer: MEDICARE

## 2021-10-21 VITALS
SYSTOLIC BLOOD PRESSURE: 110 MMHG | RESPIRATION RATE: 16 BRPM | HEART RATE: 84 BPM | DIASTOLIC BLOOD PRESSURE: 68 MMHG | OXYGEN SATURATION: 99 % | TEMPERATURE: 98.8 F

## 2021-10-21 PROCEDURE — 3331090002 HH PPS REVENUE DEBIT

## 2021-10-21 PROCEDURE — 3331090001 HH PPS REVENUE CREDIT

## 2021-10-21 NOTE — HOME HEALTH
Patient/Caregiver Subjective:  Pt said he is not getting better over the past few days. Caregiver involvement: Pt's wife assists with ADLs and IADLs. Patient education provided this visit:  Home exercise program: See intervention  Progress toward goals: Pt verbalized and demonstrated how to position his feet in the recliner to help with edema control. Continued need for the following skills: Pt continues to have decreased ability to dress, transfer safely, and has decreased UE function. Occupational Therapy will continue for there ex, transfer training, and ADL training. The following discharge planning was discussed with the pt/caregiver: Pt will be seen 1 x week x 1, 2 x week x 1, 1 x week x 1 then discharged if goals are met.

## 2021-10-22 ENCOUNTER — HOME CARE VISIT (OUTPATIENT)
Dept: SCHEDULING | Facility: HOME HEALTH | Age: 76
End: 2021-10-22
Payer: MEDICARE

## 2021-10-22 PROCEDURE — 3331090002 HH PPS REVENUE DEBIT

## 2021-10-22 PROCEDURE — 3331090001 HH PPS REVENUE CREDIT

## 2021-10-22 PROCEDURE — G0152 HHCP-SERV OF OT,EA 15 MIN: HCPCS

## 2021-10-23 VITALS
TEMPERATURE: 98.4 F | OXYGEN SATURATION: 95 % | SYSTOLIC BLOOD PRESSURE: 108 MMHG | HEART RATE: 94 BPM | RESPIRATION RATE: 16 BRPM | DIASTOLIC BLOOD PRESSURE: 62 MMHG

## 2021-10-23 PROCEDURE — 3331090001 HH PPS REVENUE CREDIT

## 2021-10-23 PROCEDURE — 3331090002 HH PPS REVENUE DEBIT

## 2021-10-24 PROCEDURE — 3331090002 HH PPS REVENUE DEBIT

## 2021-10-24 PROCEDURE — 3331090001 HH PPS REVENUE CREDIT

## 2021-10-24 NOTE — HOME HEALTH
Patient/Caregiver Subjective:  Pt said he has been elevating his legs 5 x daily and he thinks his feet are better. Caregiver involvement: Pt's wife assists with ADLs and IADLs. Patient education provided this visit:  I educated pt and his wife on raising his recliner by placing 4x4 under recliner. Home exercise program: See intervention  Progress toward goals: Pt and his wife verbalized understanding of environmental.  He was improved in commode transfer and was able to take his walker all the way into the bathroom. .    Continued need for the following skills:  Pt continues to need environmental changes for safety, transfer problems, and ADL deficits Occupational Therapy will continue for thera ex, ADL, transfer training. The following discharge planning was discussed with the pt/caregiver: Pt will be seen 2 x week x 1 then discharge.

## 2021-10-25 ENCOUNTER — HOME CARE VISIT (OUTPATIENT)
Dept: SCHEDULING | Facility: HOME HEALTH | Age: 76
End: 2021-10-25
Payer: MEDICARE

## 2021-10-25 PROCEDURE — G0155 HHCP-SVS OF CSW,EA 15 MIN: HCPCS

## 2021-10-25 PROCEDURE — 3331090001 HH PPS REVENUE CREDIT

## 2021-10-25 PROCEDURE — 3331090002 HH PPS REVENUE DEBIT

## 2021-10-26 ENCOUNTER — HOME CARE VISIT (OUTPATIENT)
Dept: HOME HEALTH SERVICES | Facility: HOME HEALTH | Age: 76
End: 2021-10-26
Payer: MEDICARE

## 2021-10-26 PROCEDURE — 3331090001 HH PPS REVENUE CREDIT

## 2021-10-26 PROCEDURE — 3331090002 HH PPS REVENUE DEBIT

## 2021-10-27 ENCOUNTER — HOME CARE VISIT (OUTPATIENT)
Dept: SCHEDULING | Facility: HOME HEALTH | Age: 76
End: 2021-10-27
Payer: MEDICARE

## 2021-10-27 PROCEDURE — 3331090002 HH PPS REVENUE DEBIT

## 2021-10-27 PROCEDURE — G0152 HHCP-SERV OF OT,EA 15 MIN: HCPCS

## 2021-10-27 PROCEDURE — 3331090001 HH PPS REVENUE CREDIT

## 2021-10-28 ENCOUNTER — HOME CARE VISIT (OUTPATIENT)
Dept: HOME HEALTH SERVICES | Facility: HOME HEALTH | Age: 76
End: 2021-10-28
Payer: MEDICARE

## 2021-10-28 VITALS
HEART RATE: 84 BPM | OXYGEN SATURATION: 98 % | RESPIRATION RATE: 15 BRPM | DIASTOLIC BLOOD PRESSURE: 60 MMHG | TEMPERATURE: 98.1 F | SYSTOLIC BLOOD PRESSURE: 110 MMHG

## 2021-10-28 PROCEDURE — 3331090002 HH PPS REVENUE DEBIT

## 2021-10-28 PROCEDURE — 3331090001 HH PPS REVENUE CREDIT

## 2021-10-28 NOTE — HOME HEALTH
Patient/Caregiver Subjective:  Pt said he has not been doing his exercises except when therapy is with him. Caregiver involvement: Pt's wife and family assist with ADLs and IADLs. Patient education provided this visit:  Energy conservation  Home exercise program: See intervention  Progress toward goals: Pt has improved in UE and LE dressing. He has not made changes to environment to make him safe, obtained AE as instructed, or perform his HEP as directed. Continued need for the following skills: Pt continues to have decreased ability for safe transfers, ADLs, and UE function. This is partly due to his not following through with instructions. Occupational Therapy will continue for transfer training, ADL training, and UE thera ex. The following discharge planning was discussed with the pt/caregiver: Pt will be seen 1 x week x 2 then discharged when goals are met.

## 2021-10-29 ENCOUNTER — HOME CARE VISIT (OUTPATIENT)
Dept: SCHEDULING | Facility: HOME HEALTH | Age: 76
End: 2021-10-29
Payer: MEDICARE

## 2021-10-29 PROCEDURE — G0152 HHCP-SERV OF OT,EA 15 MIN: HCPCS

## 2021-10-29 PROCEDURE — 3331090002 HH PPS REVENUE DEBIT

## 2021-10-29 PROCEDURE — 3331090001 HH PPS REVENUE CREDIT

## 2021-10-30 VITALS
OXYGEN SATURATION: 97 % | RESPIRATION RATE: 16 BRPM | TEMPERATURE: 98.4 F | HEART RATE: 74 BPM | DIASTOLIC BLOOD PRESSURE: 65 MMHG | SYSTOLIC BLOOD PRESSURE: 128 MMHG

## 2021-10-30 PROCEDURE — 3331090002 HH PPS REVENUE DEBIT

## 2021-10-30 PROCEDURE — 3331090001 HH PPS REVENUE CREDIT

## 2021-10-30 NOTE — HOME HEALTH
Patient/Caregiver Subjective:  Pt said he has been elevating his feet daily. He described the correct method. Caregiver involvement: Pt's wife assists with IADLs. Patient education provided this visit:  Pt was educated about the reason he should buy AE and make environmental changes for endurance and safety. He is choosing not to make changes. Home exercise program: See intervention  Progress toward goals: Pt has improved in UE and LE dressing and is now able to dress with WATT without his O2 levels dropping below 90%. He reports washing at the sink without help. He has decided not to buy a tub transfer bench and will not attempt showering. Pt has met his goal for understanding energy conservation but does not use the techniques. Pt's family has not made environmental changes for his safety. Continued need for the following skills: Occupational Therapy will continue treatment for decreased UE function and will be trained in ther ex. The following discharge planning was discussed with the pt/caregiver: Pt will be seen 1 x 1 week then discharged to community.

## 2021-10-31 PROCEDURE — 3331090001 HH PPS REVENUE CREDIT

## 2021-10-31 PROCEDURE — 3331090002 HH PPS REVENUE DEBIT

## 2021-11-01 ENCOUNTER — HOME CARE VISIT (OUTPATIENT)
Dept: SCHEDULING | Facility: HOME HEALTH | Age: 76
End: 2021-11-01
Payer: MEDICARE

## 2021-11-01 VITALS
OXYGEN SATURATION: 98 % | TEMPERATURE: 98.4 F | SYSTOLIC BLOOD PRESSURE: 118 MMHG | HEART RATE: 89 BPM | DIASTOLIC BLOOD PRESSURE: 74 MMHG | RESPIRATION RATE: 15 BRPM

## 2021-11-01 PROCEDURE — 3331090001 HH PPS REVENUE CREDIT

## 2021-11-01 PROCEDURE — 3331090002 HH PPS REVENUE DEBIT

## 2021-11-01 PROCEDURE — G0152 HHCP-SERV OF OT,EA 15 MIN: HCPCS

## 2021-11-02 PROCEDURE — 3331090001 HH PPS REVENUE CREDIT

## 2021-11-02 PROCEDURE — 3331090002 HH PPS REVENUE DEBIT

## 2021-11-02 NOTE — HOME HEALTH
Patient/Caregiver Subjective:  Pt said he is a lot better than when he started. Caregiver involvement: Pt's wife assists with IADLs. Patient education provided this visit:  I reviewed HEP with pt and explained why he needed to continue even after discharge. Home exercise program: See intervention. Progress toward goals: Pt and his family did not follow through with obtaining AE or changing his environment for safety. He originally said he wanted to be able to shower and sleep in his bed. He later said he did not want to work on tub transfer or showering and wanted to continue sleeping in his recliner instead of his bed. Pt admitted he did not do his HEP except when I was with him. Pt has improved in his shoulder flexion ROM and strength. He has improved his ability to dress UE and LE to WATT. Pt is now giving himself a sponge bath but his wife said he doesn't do it often. Pt verbalizes energy conservation techniques but does not use then during functional tasks. Pt has reached his maximum level of function at this time and is discharged from OT. PT and MD notified.

## 2021-11-03 PROCEDURE — 3331090001 HH PPS REVENUE CREDIT

## 2021-11-03 PROCEDURE — 3331090002 HH PPS REVENUE DEBIT

## 2021-11-04 PROCEDURE — 3331090002 HH PPS REVENUE DEBIT

## 2021-11-04 PROCEDURE — 3331090001 HH PPS REVENUE CREDIT

## 2021-11-05 PROCEDURE — 3331090001 HH PPS REVENUE CREDIT

## 2021-11-05 PROCEDURE — 3331090002 HH PPS REVENUE DEBIT

## 2021-11-06 PROCEDURE — 3331090001 HH PPS REVENUE CREDIT

## 2021-11-06 PROCEDURE — 3331090002 HH PPS REVENUE DEBIT

## 2021-11-07 PROCEDURE — 3331090002 HH PPS REVENUE DEBIT

## 2021-11-07 PROCEDURE — 3331090001 HH PPS REVENUE CREDIT

## 2021-11-08 PROCEDURE — 400018 HH-NO PAY CLAIM PROCEDURE

## 2021-11-10 ENCOUNTER — HOME CARE VISIT (OUTPATIENT)
Dept: SCHEDULING | Facility: HOME HEALTH | Age: 76
End: 2021-11-10
Payer: MEDICARE

## 2021-11-12 NOTE — HOME HEALTH
Gustavo Ring received skilled PT s/p COPD exacerbation. This patient was seen for a total of 2 PT visits and did not return calls for additional scheduling. He was unable to be reached for his final discharge visit. He has been generally non-compliant with home health care presently and in the past. He will be discharge due to failure to follow plan of care at this time.

## 2022-02-02 ENCOUNTER — APPOINTMENT (OUTPATIENT)
Dept: GENERAL RADIOLOGY | Age: 77
DRG: 191 | End: 2022-02-02
Attending: EMERGENCY MEDICINE
Payer: MEDICARE

## 2022-02-02 ENCOUNTER — HOSPITAL ENCOUNTER (INPATIENT)
Age: 77
LOS: 2 days | Discharge: HOME | DRG: 191 | End: 2022-02-05
Attending: EMERGENCY MEDICINE | Admitting: INTERNAL MEDICINE
Payer: MEDICARE

## 2022-02-02 DIAGNOSIS — R06.02 SHORTNESS OF BREATH ON EXERTION: ICD-10-CM

## 2022-02-02 DIAGNOSIS — R06.03 ACUTE RESPIRATORY DISTRESS: Primary | ICD-10-CM

## 2022-02-02 DIAGNOSIS — J44.1 ACUTE EXACERBATION OF CHRONIC OBSTRUCTIVE PULMONARY DISEASE (COPD) (HCC): ICD-10-CM

## 2022-02-02 LAB
ALBUMIN SERPL-MCNC: 3 G/DL (ref 3.4–5)
ALBUMIN/GLOB SERPL: 0.8 {RATIO} (ref 0.8–1.7)
ALP SERPL-CCNC: 112 U/L (ref 45–117)
ALT SERPL-CCNC: 12 U/L (ref 16–61)
ANION GAP SERPL CALC-SCNC: 7 MMOL/L (ref 3–18)
AST SERPL-CCNC: 12 U/L (ref 10–38)
BASOPHILS # BLD: 0.1 K/UL (ref 0–0.1)
BASOPHILS NFR BLD: 1 % (ref 0–2)
BILIRUB SERPL-MCNC: 0.3 MG/DL (ref 0.2–1)
BNP SERPL-MCNC: 851 PG/ML (ref 0–1800)
BUN SERPL-MCNC: 28 MG/DL (ref 7–18)
BUN/CREAT SERPL: 20 (ref 12–20)
CALCIUM SERPL-MCNC: 8.8 MG/DL (ref 8.5–10.1)
CHLORIDE SERPL-SCNC: 106 MMOL/L (ref 100–111)
CO2 SERPL-SCNC: 25 MMOL/L (ref 21–32)
CREAT SERPL-MCNC: 1.37 MG/DL (ref 0.6–1.3)
DIFFERENTIAL METHOD BLD: ABNORMAL
EOSINOPHIL # BLD: 2.1 K/UL (ref 0–0.4)
EOSINOPHIL NFR BLD: 14 % (ref 0–5)
ERYTHROCYTE [DISTWIDTH] IN BLOOD BY AUTOMATED COUNT: 13.5 % (ref 11.6–14.5)
GLOBULIN SER CALC-MCNC: 3.9 G/DL (ref 2–4)
GLUCOSE SERPL-MCNC: 134 MG/DL (ref 74–99)
HCT VFR BLD AUTO: 34.8 % (ref 36–48)
HGB BLD-MCNC: 10.9 G/DL (ref 13–16)
IMM GRANULOCYTES # BLD AUTO: 0.1 K/UL (ref 0–0.04)
IMM GRANULOCYTES NFR BLD AUTO: 1 % (ref 0–0.5)
LYMPHOCYTES # BLD: 1 K/UL (ref 0.9–3.6)
LYMPHOCYTES NFR BLD: 7 % (ref 21–52)
MCH RBC QN AUTO: 26.7 PG (ref 24–34)
MCHC RBC AUTO-ENTMCNC: 31.3 G/DL (ref 31–37)
MCV RBC AUTO: 85.1 FL (ref 78–100)
MONOCYTES # BLD: 1 K/UL (ref 0.05–1.2)
MONOCYTES NFR BLD: 7 % (ref 3–10)
NEUTS SEG # BLD: 10.4 K/UL (ref 1.8–8)
NEUTS SEG NFR BLD: 70 % (ref 40–73)
NRBC # BLD: 0 K/UL (ref 0–0.01)
NRBC BLD-RTO: 0 PER 100 WBC
PLATELET # BLD AUTO: 397 K/UL (ref 135–420)
PLATELET COMMENTS,PCOM: ABNORMAL
PMV BLD AUTO: 9 FL (ref 9.2–11.8)
POTASSIUM SERPL-SCNC: 3.6 MMOL/L (ref 3.5–5.5)
PROT SERPL-MCNC: 6.9 G/DL (ref 6.4–8.2)
RBC # BLD AUTO: 4.09 M/UL (ref 4.35–5.65)
RBC MORPH BLD: ABNORMAL
SODIUM SERPL-SCNC: 138 MMOL/L (ref 136–145)
TROPONIN-HIGH SENSITIVITY: 8 NG/L (ref 0–78)
WBC # BLD AUTO: 14.7 K/UL (ref 4.6–13.2)

## 2022-02-02 PROCEDURE — 96374 THER/PROPH/DIAG INJ IV PUSH: CPT

## 2022-02-02 PROCEDURE — 85025 COMPLETE CBC W/AUTO DIFF WBC: CPT

## 2022-02-02 PROCEDURE — 71045 X-RAY EXAM CHEST 1 VIEW: CPT

## 2022-02-02 PROCEDURE — 74011250636 HC RX REV CODE- 250/636: Performed by: EMERGENCY MEDICINE

## 2022-02-02 PROCEDURE — 74011000250 HC RX REV CODE- 250: Performed by: EMERGENCY MEDICINE

## 2022-02-02 PROCEDURE — 99285 EMERGENCY DEPT VISIT HI MDM: CPT

## 2022-02-02 PROCEDURE — 93005 ELECTROCARDIOGRAM TRACING: CPT

## 2022-02-02 PROCEDURE — 80053 COMPREHEN METABOLIC PANEL: CPT

## 2022-02-02 PROCEDURE — 94762 N-INVAS EAR/PLS OXIMTRY CONT: CPT

## 2022-02-02 PROCEDURE — 84484 ASSAY OF TROPONIN QUANT: CPT

## 2022-02-02 PROCEDURE — 83880 ASSAY OF NATRIURETIC PEPTIDE: CPT

## 2022-02-02 RX ORDER — IPRATROPIUM BROMIDE AND ALBUTEROL SULFATE 2.5; .5 MG/3ML; MG/3ML
3 SOLUTION RESPIRATORY (INHALATION)
Status: COMPLETED | OUTPATIENT
Start: 2022-02-02 | End: 2022-02-02

## 2022-02-02 RX ADMIN — IPRATROPIUM BROMIDE AND ALBUTEROL SULFATE 3 ML: .5; 2.5 SOLUTION RESPIRATORY (INHALATION) at 21:19

## 2022-02-02 RX ADMIN — METHYLPREDNISOLONE SODIUM SUCCINATE 125 MG: 125 INJECTION, POWDER, FOR SOLUTION INTRAMUSCULAR; INTRAVENOUS at 21:17

## 2022-02-02 RX ADMIN — IPRATROPIUM BROMIDE AND ALBUTEROL SULFATE 3 ML: .5; 2.5 SOLUTION RESPIRATORY (INHALATION) at 21:17

## 2022-02-02 NOTE — Clinical Note
Status[de-identified] INPATIENT [101]   Type of Bed: Telemetry [19]   Cardiac Monitoring Required?: No   Inpatient Hospitalization Certified Necessary for the Following Reasons: 3.  Patient receiving treatment that can only be provided in an inpatient setting (further clarification in H&P documentation)   Admitting Diagnosis: Acute respiratory disease [871233]   Admitting Diagnosis: COPD with acute exacerbation [facility] [[id number]]   Admitting Physician: Floyd Graham [[id number]]   Attending Physician: Floyd Graham [[id number]]   Estimated Length of Stay: 2 Midnights   Discharge Plan[de-identified] Home with Office Follow-up

## 2022-02-03 ENCOUNTER — APPOINTMENT (OUTPATIENT)
Dept: CT IMAGING | Age: 77
DRG: 191 | End: 2022-02-03
Attending: EMERGENCY MEDICINE
Payer: MEDICARE

## 2022-02-03 ENCOUNTER — APPOINTMENT (OUTPATIENT)
Dept: NON INVASIVE DIAGNOSTICS | Age: 77
DRG: 191 | End: 2022-02-03
Attending: INTERNAL MEDICINE
Payer: MEDICARE

## 2022-02-03 PROBLEM — J90 LOCULATED PLEURAL EFFUSION: Status: ACTIVE | Noted: 2022-02-03

## 2022-02-03 PROBLEM — S91.002A OPEN WOUND OF LEFT ANKLE: Status: RESOLVED | Noted: 2021-08-20 | Resolved: 2022-02-03

## 2022-02-03 PROBLEM — S70.02XA HEMATOMA OF LEFT HIP: Status: RESOLVED | Noted: 2021-04-19 | Resolved: 2022-02-03

## 2022-02-03 PROBLEM — L97.323 NON-PRESSURE CHRONIC ULCER OF LEFT ANKLE WITH NECROSIS OF MUSCLE (HCC): Status: RESOLVED | Noted: 2021-08-23 | Resolved: 2022-02-03

## 2022-02-03 PROBLEM — Z99.81 SUPPLEMENTAL OXYGEN DEPENDENT: Status: RESOLVED | Noted: 2021-08-20 | Resolved: 2022-02-03

## 2022-02-03 PROBLEM — Z22.322 MRSA (METHICILLIN RESISTANT STAPH AUREUS) CULTURE POSITIVE: Status: RESOLVED | Noted: 2021-08-20 | Resolved: 2022-02-03

## 2022-02-03 PROBLEM — M25.552 LEFT HIP PAIN: Status: RESOLVED | Noted: 2021-04-05 | Resolved: 2022-02-03

## 2022-02-03 PROBLEM — J06.9 ACUTE RESPIRATORY DISEASE: Status: ACTIVE | Noted: 2022-02-03

## 2022-02-03 PROBLEM — R91.8 RIGHT LOWER LOBE LUNG MASS: Status: ACTIVE | Noted: 2022-02-03

## 2022-02-03 PROBLEM — M86.9 PYOGENIC INFLAMMATION OF BONE (HCC): Status: RESOLVED | Noted: 2021-08-20 | Resolved: 2022-02-03

## 2022-02-03 PROBLEM — J96.21 ACUTE ON CHRONIC RESPIRATORY FAILURE WITH HYPOXEMIA (HCC): Status: ACTIVE | Noted: 2019-08-19

## 2022-02-03 PROBLEM — R50.9 FEBRILE ILLNESS: Status: RESOLVED | Noted: 2021-09-30 | Resolved: 2022-02-03

## 2022-02-03 PROBLEM — F17.200 TOBACCO DEPENDENCE: Status: RESOLVED | Noted: 2020-02-21 | Resolved: 2022-02-03

## 2022-02-03 PROBLEM — L08.9 INFECTED WOUND: Status: RESOLVED | Noted: 2021-08-20 | Resolved: 2022-02-03

## 2022-02-03 PROBLEM — M25.559 ACUTE HIP PAIN: Status: RESOLVED | Noted: 2021-04-18 | Resolved: 2022-02-03

## 2022-02-03 PROBLEM — N50.89 SCROTAL EDEMA: Status: RESOLVED | Noted: 2021-04-23 | Resolved: 2022-02-03

## 2022-02-03 PROBLEM — S72.413A INTERCONDYLAR FRACTURE OF FEMUR (HCC): Status: RESOLVED | Noted: 2021-07-12 | Resolved: 2022-02-03

## 2022-02-03 PROBLEM — J06.9 ACUTE RESPIRATORY DISEASE: Status: RESOLVED | Noted: 2022-02-03 | Resolved: 2022-02-03

## 2022-02-03 PROBLEM — H91.93 BILATERAL DEAFNESS: Status: ACTIVE | Noted: 2022-02-03

## 2022-02-03 PROBLEM — J81.0 ACUTE PULMONARY EDEMA (HCC): Status: RESOLVED | Noted: 2021-04-10 | Resolved: 2022-02-03

## 2022-02-03 PROBLEM — I82.409 DVT (DEEP VENOUS THROMBOSIS) (HCC): Status: RESOLVED | Noted: 2021-10-01 | Resolved: 2022-02-03

## 2022-02-03 PROBLEM — M79.605 LEFT LEG PAIN: Status: RESOLVED | Noted: 2021-07-12 | Resolved: 2022-02-03

## 2022-02-03 PROBLEM — J92.0 CALCIFIED PLEURAL PLAQUE DUE TO ASBESTOS EXPOSURE: Status: ACTIVE | Noted: 2022-02-03

## 2022-02-03 PROBLEM — M86.9 OSTEOMYELITIS OF LEFT ANKLE (HCC): Status: RESOLVED | Noted: 2021-08-20 | Resolved: 2022-02-03

## 2022-02-03 PROBLEM — Z96.642 STATUS POST TOTAL REPLACEMENT OF LEFT HIP: Status: RESOLVED | Noted: 2021-04-14 | Resolved: 2022-02-03

## 2022-02-03 PROBLEM — S71.002A OPEN WOUND OF LEFT HIP: Status: RESOLVED | Noted: 2021-05-26 | Resolved: 2022-02-03

## 2022-02-03 PROBLEM — E66.9 OBESITY: Status: RESOLVED | Noted: 2021-04-10 | Resolved: 2022-02-03

## 2022-02-03 PROBLEM — T14.8XXA INFECTED WOUND: Status: RESOLVED | Noted: 2021-08-20 | Resolved: 2022-02-03

## 2022-02-03 PROBLEM — L03.116 CELLULITIS OF LEFT ANKLE: Status: RESOLVED | Noted: 2021-08-23 | Resolved: 2022-02-03

## 2022-02-03 PROBLEM — I82.411 ACUTE DEEP VEIN THROMBOSIS (DVT) OF FEMORAL VEIN OF RIGHT LOWER EXTREMITY (HCC): Status: ACTIVE | Noted: 2022-02-03

## 2022-02-03 LAB
ALBUMIN SERPL-MCNC: 2.6 G/DL (ref 3.4–5)
ALBUMIN/GLOB SERPL: 0.7 {RATIO} (ref 0.8–1.7)
ALP SERPL-CCNC: 98 U/L (ref 45–117)
ALT SERPL-CCNC: 10 U/L (ref 16–61)
ANION GAP SERPL CALC-SCNC: 6 MMOL/L (ref 3–18)
AST SERPL-CCNC: 8 U/L (ref 10–38)
ATRIAL RATE: 103 BPM
BASOPHILS # BLD: 0 K/UL (ref 0–0.1)
BASOPHILS NFR BLD: 0 % (ref 0–2)
BILIRUB SERPL-MCNC: 0.2 MG/DL (ref 0.2–1)
BUN SERPL-MCNC: 27 MG/DL (ref 7–18)
BUN/CREAT SERPL: 21 (ref 12–20)
CALCIUM SERPL-MCNC: 8.3 MG/DL (ref 8.5–10.1)
CALCULATED P AXIS, ECG09: 79 DEGREES
CALCULATED R AXIS, ECG10: 36 DEGREES
CALCULATED T AXIS, ECG11: 66 DEGREES
CHLORIDE SERPL-SCNC: 107 MMOL/L (ref 100–111)
CO2 SERPL-SCNC: 26 MMOL/L (ref 21–32)
COVID-19 RAPID TEST, COVR: NOT DETECTED
CREAT SERPL-MCNC: 1.27 MG/DL (ref 0.6–1.3)
DIAGNOSIS, 93000: NORMAL
DIFFERENTIAL METHOD BLD: ABNORMAL
ECHO AO ROOT DIAM: 3.6 CM
ECHO AO ROOT INDEX: 1.8 CM/M2
ECHO AV MEAN GRADIENT: 8 MMHG
ECHO AV MEAN VELOCITY: 1.3 M/S
ECHO AV PEAK GRADIENT: 14 MMHG
ECHO AV PEAK VELOCITY: 1.9 M/S
ECHO AV VTI: 42.2 CM
ECHO LA VOL 2C: 49 ML (ref 18–58)
ECHO LA VOL 4C: 48 ML (ref 18–58)
ECHO LA VOL BP: 53 ML (ref 18–58)
ECHO LA VOL/BSA BIPLANE: 27 ML/M2 (ref 16–34)
ECHO LA VOLUME AREA LENGTH: 55 ML
ECHO LA VOLUME INDEX A2C: 25 ML/M2 (ref 16–34)
ECHO LA VOLUME INDEX A4C: 24 ML/M2 (ref 16–34)
ECHO LA VOLUME INDEX AREA LENGTH: 28 ML/M2 (ref 16–34)
ECHO LV E' LATERAL VELOCITY: 11 CM/S
ECHO LV E' SEPTAL VELOCITY: 8 CM/S
ECHO LV EDV A2C: 110 ML
ECHO LV EDV A4C: 113 ML
ECHO LV EDV BP: 117 ML (ref 67–155)
ECHO LV EDV INDEX A4C: 57 ML/M2
ECHO LV EDV INDEX BP: 59 ML/M2
ECHO LV EDV NDEX A2C: 55 ML/M2
ECHO LV EJECTION FRACTION A2C: 50 %
ECHO LV EJECTION FRACTION A4C: 63 %
ECHO LV EJECTION FRACTION BIPLANE: 58 % (ref 55–100)
ECHO LV ESV A2C: 55 ML
ECHO LV ESV A4C: 42 ML
ECHO LV ESV BP: 50 ML (ref 22–58)
ECHO LV ESV INDEX A2C: 28 ML/M2
ECHO LV ESV INDEX A4C: 21 ML/M2
ECHO LV ESV INDEX BP: 25 ML/M2
ECHO LV FRACTIONAL SHORTENING: 25 % (ref 28–44)
ECHO LV INTERNAL DIMENSION DIASTOLE INDEX: 2.75 CM/M2
ECHO LV INTERNAL DIMENSION DIASTOLIC: 5.5 CM (ref 4.2–5.9)
ECHO LV INTERNAL DIMENSION SYSTOLIC INDEX: 2.05 CM/M2
ECHO LV INTERNAL DIMENSION SYSTOLIC: 4.1 CM
ECHO LV IVSD: 1.2 CM (ref 0.6–1)
ECHO LV MASS 2D: 227.4 G (ref 88–224)
ECHO LV MASS INDEX 2D: 113.7 G/M2 (ref 49–115)
ECHO LV POSTERIOR WALL DIASTOLIC: 0.9 CM (ref 0.6–1)
ECHO LV RELATIVE WALL THICKNESS RATIO: 0.33
ECHO LVOT AREA: 3.1 CM2
ECHO LVOT DIAM: 2 CM
ECHO MV A VELOCITY: 1.3 M/S
ECHO MV E DECELERATION TIME (DT): 301 MS
ECHO MV E VELOCITY: 0.9 M/S
ECHO MV E/A RATIO: 0.69
ECHO MV E/E' LATERAL: 8.18
ECHO MV E/E' RATIO (AVERAGED): 9.72
ECHO MV E/E' SEPTAL: 11.25
ECHO RV FREE WALL PEAK S': 13 CM/S
ECHO RV INTERNAL DIMENSION: 3.3 CM
ECHO RV TAPSE: 2.7 CM (ref 1.5–2)
EOSINOPHIL # BLD: 0 K/UL (ref 0–0.4)
EOSINOPHIL NFR BLD: 0 % (ref 0–5)
ERYTHROCYTE [DISTWIDTH] IN BLOOD BY AUTOMATED COUNT: 13.4 % (ref 11.6–14.5)
FLUAV AG NPH QL IA: NEGATIVE
FLUBV AG NOSE QL IA: NEGATIVE
GLOBULIN SER CALC-MCNC: 3.8 G/DL (ref 2–4)
GLUCOSE SERPL-MCNC: 170 MG/DL (ref 74–99)
HCT VFR BLD AUTO: 31.7 % (ref 36–48)
HGB BLD-MCNC: 10 G/DL (ref 13–16)
IMM GRANULOCYTES # BLD AUTO: 0.1 K/UL (ref 0–0.04)
IMM GRANULOCYTES NFR BLD AUTO: 1 % (ref 0–0.5)
L PNEUMO AG UR QL IA: NEGATIVE
LYMPHOCYTES # BLD: 0.2 K/UL (ref 0.9–3.6)
LYMPHOCYTES NFR BLD: 1 % (ref 21–52)
MAGNESIUM SERPL-MCNC: 1.9 MG/DL (ref 1.6–2.6)
MCH RBC QN AUTO: 27.2 PG (ref 24–34)
MCHC RBC AUTO-ENTMCNC: 31.5 G/DL (ref 31–37)
MCV RBC AUTO: 86.1 FL (ref 78–100)
MONOCYTES # BLD: 0.1 K/UL (ref 0.05–1.2)
MONOCYTES NFR BLD: 1 % (ref 3–10)
NEUTS SEG # BLD: 12.2 K/UL (ref 1.8–8)
NEUTS SEG NFR BLD: 97 % (ref 40–73)
NRBC # BLD: 0 K/UL (ref 0–0.01)
NRBC BLD-RTO: 0 PER 100 WBC
P-R INTERVAL, ECG05: 176 MS
PLATELET # BLD AUTO: 355 K/UL (ref 135–420)
PMV BLD AUTO: 9 FL (ref 9.2–11.8)
POTASSIUM SERPL-SCNC: 3.9 MMOL/L (ref 3.5–5.5)
PROT SERPL-MCNC: 6.4 G/DL (ref 6.4–8.2)
Q-T INTERVAL, ECG07: 354 MS
QRS DURATION, ECG06: 82 MS
QTC CALCULATION (BEZET), ECG08: 463 MS
RBC # BLD AUTO: 3.68 M/UL (ref 4.35–5.65)
S PNEUM AG UR QL: NEGATIVE
SARS-COV-2, COV2: NORMAL
SARS-COV-2, NAA: NOT DETECTED
SODIUM SERPL-SCNC: 139 MMOL/L (ref 136–145)
SOURCE, COVRS: NORMAL
TROPONIN-HIGH SENSITIVITY: 9 NG/L (ref 0–78)
TROPONIN-HIGH SENSITIVITY: 9 NG/L (ref 0–78)
VENTRICULAR RATE, ECG03: 103 BPM
WBC # BLD AUTO: 12.6 K/UL (ref 4.6–13.2)

## 2022-02-03 PROCEDURE — 87040 BLOOD CULTURE FOR BACTERIA: CPT

## 2022-02-03 PROCEDURE — 65660000000 HC RM CCU STEPDOWN

## 2022-02-03 PROCEDURE — 87449 NOS EACH ORGANISM AG IA: CPT

## 2022-02-03 PROCEDURE — 87635 SARS-COV-2 COVID-19 AMP PRB: CPT

## 2022-02-03 PROCEDURE — 71275 CT ANGIOGRAPHY CHEST: CPT

## 2022-02-03 PROCEDURE — U0003 INFECTIOUS AGENT DETECTION BY NUCLEIC ACID (DNA OR RNA); SEVERE ACUTE RESPIRATORY SYNDROME CORONAVIRUS 2 (SARS-COV-2) (CORONAVIRUS DISEASE [COVID-19]), AMPLIFIED PROBE TECHNIQUE, MAKING USE OF HIGH THROUGHPUT TECHNOLOGIES AS DESCRIBED BY CMS-2020-01-R: HCPCS

## 2022-02-03 PROCEDURE — 74011250636 HC RX REV CODE- 250/636: Performed by: EMERGENCY MEDICINE

## 2022-02-03 PROCEDURE — 83735 ASSAY OF MAGNESIUM: CPT

## 2022-02-03 PROCEDURE — 74011000250 HC RX REV CODE- 250: Performed by: INTERNAL MEDICINE

## 2022-02-03 PROCEDURE — 74011250636 HC RX REV CODE- 250/636: Performed by: INTERNAL MEDICINE

## 2022-02-03 PROCEDURE — 77010033678 HC OXYGEN DAILY

## 2022-02-03 PROCEDURE — 80053 COMPREHEN METABOLIC PANEL: CPT

## 2022-02-03 PROCEDURE — 94640 AIRWAY INHALATION TREATMENT: CPT

## 2022-02-03 PROCEDURE — 93306 TTE W/DOPPLER COMPLETE: CPT

## 2022-02-03 PROCEDURE — 74011250637 HC RX REV CODE- 250/637: Performed by: INTERNAL MEDICINE

## 2022-02-03 PROCEDURE — 84484 ASSAY OF TROPONIN QUANT: CPT

## 2022-02-03 PROCEDURE — 74011000636 HC RX REV CODE- 636: Performed by: INTERNAL MEDICINE

## 2022-02-03 PROCEDURE — 74011000258 HC RX REV CODE- 258: Performed by: EMERGENCY MEDICINE

## 2022-02-03 PROCEDURE — 85025 COMPLETE CBC W/AUTO DIFF WBC: CPT

## 2022-02-03 PROCEDURE — 87804 INFLUENZA ASSAY W/OPTIC: CPT

## 2022-02-03 RX ORDER — GABAPENTIN 100 MG/1
100 CAPSULE ORAL 3 TIMES DAILY
Status: DISCONTINUED | OUTPATIENT
Start: 2022-02-03 | End: 2022-02-05 | Stop reason: HOSPADM

## 2022-02-03 RX ORDER — ARFORMOTEROL TARTRATE 15 UG/2ML
15 SOLUTION RESPIRATORY (INHALATION)
Status: DISCONTINUED | OUTPATIENT
Start: 2022-02-03 | End: 2022-02-05 | Stop reason: HOSPADM

## 2022-02-03 RX ORDER — POLYETHYLENE GLYCOL 3350 17 G/17G
17 POWDER, FOR SOLUTION ORAL DAILY PRN
Status: DISCONTINUED | OUTPATIENT
Start: 2022-02-03 | End: 2022-02-05 | Stop reason: HOSPADM

## 2022-02-03 RX ORDER — BUDESONIDE 0.5 MG/2ML
500 INHALANT ORAL
Status: DISCONTINUED | OUTPATIENT
Start: 2022-02-03 | End: 2022-02-05 | Stop reason: HOSPADM

## 2022-02-03 RX ORDER — TRAMADOL HYDROCHLORIDE 50 MG/1
50 TABLET ORAL
Status: DISCONTINUED | OUTPATIENT
Start: 2022-02-03 | End: 2022-02-05 | Stop reason: HOSPADM

## 2022-02-03 RX ORDER — BUDESONIDE 0.5 MG/2ML
500 INHALANT ORAL 2 TIMES DAILY
Status: DISCONTINUED | OUTPATIENT
Start: 2022-02-03 | End: 2022-02-03

## 2022-02-03 RX ORDER — PANTOPRAZOLE SODIUM 40 MG/1
40 TABLET, DELAYED RELEASE ORAL
Status: DISCONTINUED | OUTPATIENT
Start: 2022-02-04 | End: 2022-02-05 | Stop reason: HOSPADM

## 2022-02-03 RX ORDER — ACETAMINOPHEN 650 MG/1
650 SUPPOSITORY RECTAL
Status: DISCONTINUED | OUTPATIENT
Start: 2022-02-03 | End: 2022-02-05 | Stop reason: HOSPADM

## 2022-02-03 RX ORDER — SODIUM CHLORIDE 0.9 % (FLUSH) 0.9 %
5-40 SYRINGE (ML) INJECTION EVERY 8 HOURS
Status: DISCONTINUED | OUTPATIENT
Start: 2022-02-03 | End: 2022-02-05 | Stop reason: HOSPADM

## 2022-02-03 RX ORDER — ALBUTEROL SULFATE 90 UG/1
2 AEROSOL, METERED RESPIRATORY (INHALATION)
Status: DISCONTINUED | OUTPATIENT
Start: 2022-02-03 | End: 2022-02-05 | Stop reason: HOSPADM

## 2022-02-03 RX ORDER — FUROSEMIDE 40 MG/1
20 TABLET ORAL DAILY
Status: DISCONTINUED | OUTPATIENT
Start: 2022-02-03 | End: 2022-02-03

## 2022-02-03 RX ORDER — SODIUM CHLORIDE 0.9 % (FLUSH) 0.9 %
5-40 SYRINGE (ML) INJECTION AS NEEDED
Status: DISCONTINUED | OUTPATIENT
Start: 2022-02-03 | End: 2022-02-05 | Stop reason: HOSPADM

## 2022-02-03 RX ORDER — FUROSEMIDE 10 MG/ML
20 INJECTION INTRAMUSCULAR; INTRAVENOUS DAILY
Status: COMPLETED | OUTPATIENT
Start: 2022-02-03 | End: 2022-02-04

## 2022-02-03 RX ORDER — TAMSULOSIN HYDROCHLORIDE 0.4 MG/1
0.4 CAPSULE ORAL EVERY EVENING
Status: DISCONTINUED | OUTPATIENT
Start: 2022-02-03 | End: 2022-02-05 | Stop reason: HOSPADM

## 2022-02-03 RX ORDER — ONDANSETRON 4 MG/1
4 TABLET, ORALLY DISINTEGRATING ORAL
Status: DISCONTINUED | OUTPATIENT
Start: 2022-02-03 | End: 2022-02-05 | Stop reason: HOSPADM

## 2022-02-03 RX ORDER — IPRATROPIUM BROMIDE AND ALBUTEROL SULFATE 2.5; .5 MG/3ML; MG/3ML
3 SOLUTION RESPIRATORY (INHALATION)
Status: DISCONTINUED | OUTPATIENT
Start: 2022-02-03 | End: 2022-02-05 | Stop reason: HOSPADM

## 2022-02-03 RX ORDER — ARFORMOTEROL TARTRATE 15 UG/2ML
15 SOLUTION RESPIRATORY (INHALATION) 2 TIMES DAILY
Status: DISCONTINUED | OUTPATIENT
Start: 2022-02-03 | End: 2022-02-03

## 2022-02-03 RX ORDER — AMLODIPINE BESYLATE 5 MG/1
5 TABLET ORAL DAILY
Status: DISCONTINUED | OUTPATIENT
Start: 2022-02-03 | End: 2022-02-05 | Stop reason: HOSPADM

## 2022-02-03 RX ORDER — ACETAMINOPHEN 325 MG/1
650 TABLET ORAL
Status: DISCONTINUED | OUTPATIENT
Start: 2022-02-03 | End: 2022-02-05 | Stop reason: HOSPADM

## 2022-02-03 RX ORDER — ONDANSETRON 2 MG/ML
4 INJECTION INTRAMUSCULAR; INTRAVENOUS
Status: DISCONTINUED | OUTPATIENT
Start: 2022-02-03 | End: 2022-02-05 | Stop reason: HOSPADM

## 2022-02-03 RX ORDER — IPRATROPIUM BROMIDE AND ALBUTEROL SULFATE 2.5; .5 MG/3ML; MG/3ML
3 SOLUTION RESPIRATORY (INHALATION)
Status: DISCONTINUED | OUTPATIENT
Start: 2022-02-03 | End: 2022-02-04

## 2022-02-03 RX ADMIN — FUROSEMIDE 20 MG: 10 INJECTION INTRAMUSCULAR; INTRAVENOUS at 09:22

## 2022-02-03 RX ADMIN — ARFORMOTEROL TARTRATE 15 MCG: 15 SOLUTION RESPIRATORY (INHALATION) at 07:34

## 2022-02-03 RX ADMIN — METHYLPREDNISOLONE SODIUM SUCCINATE 60 MG: 40 INJECTION, POWDER, FOR SOLUTION INTRAMUSCULAR; INTRAVENOUS at 06:28

## 2022-02-03 RX ADMIN — IPRATROPIUM BROMIDE AND ALBUTEROL SULFATE 3 ML: .5; 2.5 SOLUTION RESPIRATORY (INHALATION) at 12:15

## 2022-02-03 RX ADMIN — TAMSULOSIN HYDROCHLORIDE 0.4 MG: 0.4 CAPSULE ORAL at 17:41

## 2022-02-03 RX ADMIN — APIXABAN 5 MG: 5 TABLET, FILM COATED ORAL at 21:29

## 2022-02-03 RX ADMIN — CEFTRIAXONE SODIUM 2 G: 2 INJECTION, POWDER, FOR SOLUTION INTRAMUSCULAR; INTRAVENOUS at 03:15

## 2022-02-03 RX ADMIN — AZITHROMYCIN MONOHYDRATE 500 MG: 500 INJECTION, POWDER, LYOPHILIZED, FOR SOLUTION INTRAVENOUS at 02:11

## 2022-02-03 RX ADMIN — BUDESONIDE 500 MCG: 0.5 INHALANT RESPIRATORY (INHALATION) at 07:34

## 2022-02-03 RX ADMIN — BUDESONIDE 500 MCG: 0.5 INHALANT RESPIRATORY (INHALATION) at 19:01

## 2022-02-03 RX ADMIN — AMLODIPINE BESYLATE 5 MG: 5 TABLET ORAL at 09:22

## 2022-02-03 RX ADMIN — METHYLPREDNISOLONE SODIUM SUCCINATE 60 MG: 40 INJECTION, POWDER, FOR SOLUTION INTRAMUSCULAR; INTRAVENOUS at 14:42

## 2022-02-03 RX ADMIN — ARFORMOTEROL TARTRATE 15 MCG: 15 SOLUTION RESPIRATORY (INHALATION) at 19:01

## 2022-02-03 RX ADMIN — METHYLPREDNISOLONE SODIUM SUCCINATE 60 MG: 40 INJECTION, POWDER, FOR SOLUTION INTRAMUSCULAR; INTRAVENOUS at 21:29

## 2022-02-03 RX ADMIN — APIXABAN 5 MG: 5 TABLET, FILM COATED ORAL at 09:22

## 2022-02-03 RX ADMIN — GABAPENTIN 100 MG: 100 CAPSULE ORAL at 09:22

## 2022-02-03 RX ADMIN — GABAPENTIN 100 MG: 100 CAPSULE ORAL at 17:41

## 2022-02-03 RX ADMIN — SODIUM CHLORIDE, PRESERVATIVE FREE 10 ML: 5 INJECTION INTRAVENOUS at 06:37

## 2022-02-03 RX ADMIN — GABAPENTIN 100 MG: 100 CAPSULE ORAL at 21:29

## 2022-02-03 RX ADMIN — IPRATROPIUM BROMIDE AND ALBUTEROL SULFATE 3 ML: .5; 2.5 SOLUTION RESPIRATORY (INHALATION) at 19:01

## 2022-02-03 RX ADMIN — IPRATROPIUM BROMIDE AND ALBUTEROL SULFATE 3 ML: .5; 2.5 SOLUTION RESPIRATORY (INHALATION) at 19:02

## 2022-02-03 RX ADMIN — IOPAMIDOL 75 ML: 755 INJECTION, SOLUTION INTRAVENOUS at 02:11

## 2022-02-03 NOTE — ED NOTES
TRANSFER - OUT REPORT:    Verbal report given on Casey Apa  being transferred to [facility](unit) for routine progression of care       Report consisted of patients Situation, Background, Assessment and   Recommendations(SBAR). Information from the following report(s) SBAR was reviewed with the receiving nurse. Lines:   Peripheral IV 02/02/22 Left Antecubital (Active)   Site Assessment Clean, dry, & intact 02/02/22 2109   Phlebitis Assessment 0 02/02/22 2109   Infiltration Assessment 0 02/02/22 2109   Dressing Status Clean, dry, & intact; New 02/02/22 2109   Hub Color/Line Status Flushed 02/02/22 2109        Opportunity for questions and clarification was provided.       Patient transported with:   Monitor  Registered Nurse

## 2022-02-03 NOTE — CONSULTS
[facility]  Pulmonary, Critical Care, and Sleep Medicine    Initial Patient Consult    Name: Viktor Gonsalez MRN: [id number]   : 1945 Hospital: [facility] [place]   Date: 2/3/2022  Room: [facility]     Subjective: This patient has been seen and evaluated at the request of Dr. Edward Oppenheim for routine pulmonary consultation. Patient is a 64 y.o. male with history of COPD, on home O2. Patient sees Dr. Madeilne Denson in our office, and was last seen 2021. Patient has history of COPD, prior FEV1 of 54%, on chronic home O2 at 2 L. He is also a former smoker, and has history of asbestos exposure. He has history of pleural calcifications, chronic right subpleural mass. He also has a history of atrial fibrillation, prostate cancer, chronic kidney disease. Patient has been admitted with worsening shortness of breath, cough and wheezing symptoms. He received nebulizers and steroids in the ER. He reports improvement in his breathing. He now has mostly nonproductive cough. No chest pain or palpitations. He has severe hard of hearing.     S/p Covid vaccination-does not remember name.       Past Medical History:   Diagnosis Date    Brain aneurysm     Brain aneurysm     Cancer ([address]     prostate    CHF (congestive heart failure) (HCC)     CKD (chronic kidney disease)     Closed nondisplaced supracondylar fracture of lower end of left femur without intracondylar extension with delayed healing     COPD (chronic obstructive pulmonary disease) ([address]     Debility     History of home oxygen therapy     [place] (hard of hearing)     Hypertension     Nocturia     On home oxygen therapy     PAF (paroxysmal atrial fibrillation) ([address]     Pneumonia     Prostate CA ([address]     Prostate neoplasm     Radiation effect       Past Surgical History:   Procedure Laterality Date    HX HERNIA REPAIR      HX HIP ARTHROSCOPY  2021      Prior to Admission medications    Medication Sig Start Date End Date Taking? Authorizing Provider   chlorthalidone (HYGROTON) 25 mg tablet Take 25 mg by mouth daily. Provider, Historical   traMADoL (ULTRAM) 50 mg tablet Take 50 mg by mouth every eight (8) hours as needed for Pain. Provider, Historical   gabapentin (NEURONTIN) 100 mg capsule Take 100 mg by mouth three (3) times daily. Provider, Historical   apixaban (ELIQUIS) 5 mg tablet Take 1 Tablet by mouth two (2) times a day. 10/7/21   Elsa Arceo MD   collagenase (SANTYL) 250 unit/gram ointment Apply  to affected area daily. 6/23/21   Elsa Arceo MD   arformoteroL (BROVANA) 15 mcg/2 mL nebu neb solution 2 mL by Nebulization route two (2) times a day. 4/14/21   Vance Lozano MD   budesonide (PULMICORT) 0.5 mg/2 mL nbsp 2 mL by Nebulization route two (2) times a day. 4/14/21   Vance Lozano MD   famotidine (PEPCID) 20 mg tablet Take 1 Tab by mouth daily. 4/14/21   Vance Lozano MD   ferrous sulfate 325 mg (65 mg iron) tablet Take 1 Tab by mouth two (2) times daily (with meals). 4/14/21   Vance Lozano MD   amLODIPine (NORVASC) 5 mg tablet Take 1 Tab by mouth daily. 3/3/21   Sol Zapata MD   albuterol-ipratropium (DUO-NEB) 2.5 mg-0.5 mg/3 ml nebu 3 mL by Nebulization route every four (4) hours as needed for Wheezing. Patient not taking: Reported on 10/9/2021 2/7/21   Johana Jordan MD   albuterol (PROVENTIL HFA, VENTOLIN HFA, PROAIR HFA) 90 mcg/actuation inhaler Take 2 Puffs by inhalation every four (4) hours as needed for Wheezing or Shortness of Breath. 2/7/21   Johana Jordan MD   OXYGEN-AIR DELIVERY SYSTEMS 2 L/min by Nasal route continuous. Provider, Historical   furosemide (Lasix) 20 mg tablet Take 20 mg by mouth daily. Provider, Historical   dilTIAZem (CARDIZEM) 30 mg tablet Take 1 Tab by mouth Before breakfast, lunch, and dinner.  4/14/20   Vance Lozano MD   dextran 70/hypromellose (ARTIFICIAL TEARS, PF, OP) Administer 2 Drops to both eyes daily as needed for Other (dry eyes). Provider, Historical   tamsulosin (FLOMAX) 0.4 mg capsule Take 0.4 mg by mouth every evening.     Other, MD Blayne     Allergies   Allergen Reactions    Aspirin Other (comments)     \"messes my stomach up\"    Bactrim [Sulfamethoxazole-Trimethoprim] Unknown (comments)      Social History     Tobacco Use    Smoking status: Former Smoker     Types: Cigarettes    Smokeless tobacco: Never Used   Substance Use Topics    Alcohol use: No      Family History   Problem Relation Age of Onset    Heart Disease Mother         Current Facility-Administered Medications   Medication Dose Route Frequency    sodium chloride (NS) flush 5-40 mL  5-40 mL IntraVENous Q8H    amLODIPine (NORVASC) tablet 5 mg  5 mg Oral DAILY    apixaban (ELIQUIS) tablet 5 mg  5 mg Oral BID    gabapentin (NEURONTIN) capsule 100 mg  100 mg Oral TID    tamsulosin (FLOMAX) capsule 0.4 mg  0.4 mg Oral QPM    [START ON 2/4/2022] azithromycin (ZITHROMAX) 500 mg in 0.9% sodium chloride 250 mL (VIAL-MATE)  500 mg IntraVENous Q24H    [START ON 2/4/2022] cefTRIAXone (ROCEPHIN) 1 g in 0.9% sodium chloride (MBP/ADV) 50 mL MBP  1 g IntraVENous Q24H    methylPREDNISolone (PF) (SOLU-MEDROL) injection 60 mg  60 mg IntraVENous Q8H    furosemide (LASIX) injection 20 mg  20 mg IntraVENous DAILY    arformoteroL (BROVANA) neb solution 15 mcg  15 mcg Nebulization BID RT    budesonide (PULMICORT) 500 mcg/2 ml nebulizer suspension  500 mcg Nebulization BID RT       Review of Systems: Limited due to severe hard of hearing  Ears, nose, mouth, throat, and face: No epistaxis, no difficulty in swallowing  Respiratory: No hemoptysis  Cardiovascular: no chest pain or palpitations  Gastrointestinal: no abd pain, vomitting or diarrhea  Neurological: No focal weakness or seizures   Behvioral/Psych: No anxiety or depression  Constitutional: No fever or chills       Objective:   Vital Signs:    Visit Vitals  BP (!) 141/71 (BP 1 Location: Right upper arm, BP Patient Position: At rest)   Pulse 88   Temp 97.6 °F (36.4 °C)   Resp 25   Wt 84.1 kg (185 lb 6.5 oz)   SpO2 97%   BMI 27.38 kg/m²       O2 Device: Nasal cannula   O2 Flow Rate (L/min): 3 l/min   Temp (24hrs), Av.9 °F (36.6 °C), Min:97.6 °F (36.4 °C), Max:98.2 °F (36.8 °C)       Intake/Output:   Last shift:      701 - 1900  In: -   Out: 450 [Urine:450]  Last 3 shifts: 1901 - 700  In: 300 [I.V.:300]  Out: -     Intake/Output Summary (Last 24 hours) at 2/3/2022 1308  Last data filed at 2/3/2022 1144  Gross per 24 hour   Intake 300 ml   Output 450 ml   Net -150 ml         Physical Exam:   Patient appears comfortable; elderly and stated age; on nasal cannula oxygen-3 L; acyanotic  HEENT: pupils not dilated, reactive, no scleral jaundice, moist oral mucosa  Neck: No adenopathy or thyroid swelling  CVS: S1S2 no murmurs; JVD not elevated; regular rhythm  RS: Mod air entry bilaterally, decreased BS at bases, mild bilateral wheezing, no focal crackles, not tachypneic   Abd: soft, non tender, no hepatosplenomegaly, no abd distension  Neuro: Awake, alert, nonfocal exam; severe hard of hearing  Extrm: no leg edema or swelling or clubbing  Skin: no rash  Lymphatic: no cervical or supraclavicular adenopathy  Psych: Cooperative      Data review:     Recent Results (from the past 24 hour(s))   EKG, 12 LEAD, INITIAL    Collection Time: 22  9:04 PM   Result Value Ref Range    Ventricular Rate 103 BPM    Atrial Rate 103 BPM    P-R Interval 176 ms    QRS Duration 82 ms    Q-T Interval 354 ms    QTC Calculation (Bezet) 463 ms    Calculated P Axis 79 degrees    Calculated R Axis 36 degrees    Calculated T Axis 66 degrees    Diagnosis       Sinus tachycardia  Otherwise normal ECG  When compared with ECG of 30-SEP-2021 20:07,  No significant change was found  Confirmed by Carlene Lefort, MD. ([id number]) on 2022 11:59:26 PM  Also confirmed by Carlene Lefort, MD. ([id number]),  Zurdo Gusman ([id number])    on 2/3/2022 8:55:55 AM     CBC WITH AUTOMATED DIFF    Collection Time: 02/02/22  9:13 PM   Result Value Ref Range    WBC 14.7 (H) 4.6 - 13.2 K/uL    RBC 4.09 (L) 4.35 - 5.65 M/uL    HGB 10.9 (L) 13.0 - 16.0 g/dL    HCT 34.8 (L) 36.0 - 48.0 %    MCV 85.1 78.0 - 100.0 FL    MCH 26.7 24.0 - 34.0 PG    MCHC 31.3 31.0 - 37.0 g/dL    RDW 13.5 11.6 - 14.5 %    PLATELET 877 576 - 155 K/uL    MPV 9.0 (L) 9.2 - 11.8 FL    NRBC 0.0 0  WBC    ABSOLUTE NRBC 0.00 0.00 - 0.01 K/uL    NEUTROPHILS 70 40 - 73 %    LYMPHOCYTES 7 (L) 21 - 52 %    MONOCYTES 7 3 - 10 %    EOSINOPHILS 14 (H) 0 - 5 %    BASOPHILS 1 0 - 2 %    IMMATURE GRANULOCYTES 1 (H) 0.0 - 0.5 %    ABS. NEUTROPHILS 10.4 (H) 1.8 - 8.0 K/UL    ABS. LYMPHOCYTES 1.0 0.9 - 3.6 K/UL    ABS. MONOCYTES 1.0 0.05 - 1.2 K/UL    ABS. EOSINOPHILS 2.1 (H) 0.0 - 0.4 K/UL    ABS. BASOPHILS 0.1 0.0 - 0.1 K/UL    ABS. IMM. GRANS. 0.1 (H) 0.00 - 0.04 K/UL    DF AUTOMATED      PLATELET COMMENTS LARGE PLATELETS      RBC COMMENTS ANISOCYTOSIS  SIGHT       METABOLIC PANEL, COMPREHENSIVE    Collection Time: 02/02/22  9:13 PM   Result Value Ref Range    Sodium 138 136 - 145 mmol/L    Potassium 3.6 3.5 - 5.5 mmol/L    Chloride 106 100 - 111 mmol/L    CO2 25 21 - 32 mmol/L    Anion gap 7 3.0 - 18 mmol/L    Glucose 134 (H) 74 - 99 mg/dL    BUN 28 (H) 7.0 - 18 MG/DL    Creatinine 1.37 (H) 0.6 - 1.3 MG/DL    BUN/Creatinine ratio 20 12 - 20      GFR est AA >60 >60 ml/min/1.73m2    GFR est non-AA 50 (L) >60 ml/min/1.73m2    Calcium 8.8 8.5 - 10.1 MG/DL    Bilirubin, total 0.3 0.2 - 1.0 MG/DL    ALT (SGPT) 12 (L) 16 - 61 U/L    AST (SGOT) 12 10 - 38 U/L    Alk.  phosphatase 112 45 - 117 U/L    Protein, total 6.9 6.4 - 8.2 g/dL    Albumin 3.0 (L) 3.4 - 5.0 g/dL    Globulin 3.9 2.0 - 4.0 g/dL    A-G Ratio 0.8 0.8 - 1.7     NT-PRO BNP    Collection Time: 02/02/22  9:13 PM   Result Value Ref Range    NT pro- 0 - 1,800 PG/ML   TROPONIN-HIGH SENSITIVITY    Collection Time: 02/02/22  9:13 PM   Result Value Ref Range    Troponin-High Sensitivity 8 0 - 78 ng/L   COVID-19 RAPID TEST    Collection Time: 02/03/22  2:25 AM   Result Value Ref Range    Specimen source Nasopharyngeal      COVID-19 rapid test Not detected NOTD     SARS-COV-2    Collection Time: 02/03/22  2:25 AM   Result Value Ref Range    SARS-CoV-2 Please find results under separate order     LEGIONELLA PNEUMOPHILA AG, URINE    Collection Time: 02/03/22  4:20 AM    Specimen: Urine, random   Result Value Ref Range    Legionella Ag, urine Negative NEG     STREP PNEUMO AG, URINE    Collection Time: 02/03/22  4:20 AM    Specimen: Urine, random   Result Value Ref Range    Strep pneumo Ag, urine Negative NEG     TROPONIN-HIGH SENSITIVITY    Collection Time: 02/03/22  4:20 AM   Result Value Ref Range    Troponin-High Sensitivity 9 0 - 78 ng/L   METABOLIC PANEL, COMPREHENSIVE    Collection Time: 02/03/22  4:20 AM   Result Value Ref Range    Sodium 139 136 - 145 mmol/L    Potassium 3.9 3.5 - 5.5 mmol/L    Chloride 107 100 - 111 mmol/L    CO2 26 21 - 32 mmol/L    Anion gap 6 3.0 - 18 mmol/L    Glucose 170 (H) 74 - 99 mg/dL    BUN 27 (H) 7.0 - 18 MG/DL    Creatinine 1.27 0.6 - 1.3 MG/DL    BUN/Creatinine ratio 21 (H) 12 - 20      GFR est AA >60 >60 ml/min/1.73m2    GFR est non-AA 55 (L) >60 ml/min/1.73m2    Calcium 8.3 (L) 8.5 - 10.1 MG/DL    Bilirubin, total 0.2 0.2 - 1.0 MG/DL    ALT (SGPT) 10 (L) 16 - 61 U/L    AST (SGOT) 8 (L) 10 - 38 U/L    Alk.  phosphatase 98 45 - 117 U/L    Protein, total 6.4 6.4 - 8.2 g/dL    Albumin 2.6 (L) 3.4 - 5.0 g/dL    Globulin 3.8 2.0 - 4.0 g/dL    A-G Ratio 0.7 (L) 0.8 - 1.7     CBC WITH AUTOMATED DIFF    Collection Time: 02/03/22  4:20 AM   Result Value Ref Range    WBC 12.6 4.6 - 13.2 K/uL    RBC 3.68 (L) 4.35 - 5.65 M/uL    HGB 10.0 (L) 13.0 - 16.0 g/dL    HCT 31.7 (L) 36.0 - 48.0 %    MCV 86.1 78.0 - 100.0 FL    MCH 27.2 24.0 - 34.0 PG    MCHC 31.5 31.0 - 37.0 g/dL    RDW 13.4 11.6 - 14.5 %    PLATELET 975 921 - 568 K/uL    MPV 9.0 (L) 9.2 - 11.8 FL    NRBC 0.0 0  WBC    ABSOLUTE NRBC 0.00 0.00 - 0.01 K/uL    NEUTROPHILS 97 (H) 40 - 73 %    LYMPHOCYTES 1 (L) 21 - 52 %    MONOCYTES 1 (L) 3 - 10 %    EOSINOPHILS 0 0 - 5 %    BASOPHILS 0 0 - 2 %    IMMATURE GRANULOCYTES 1 (H) 0.0 - 0.5 %    ABS. NEUTROPHILS 12.2 (H) 1.8 - 8.0 K/UL    ABS. LYMPHOCYTES 0.2 (L) 0.9 - 3.6 K/UL    ABS. MONOCYTES 0.1 0.05 - 1.2 K/UL    ABS. EOSINOPHILS 0.0 0.0 - 0.4 K/UL    ABS. BASOPHILS 0.0 0.0 - 0.1 K/UL    ABS. IMM. GRANS. 0.1 (H) 0.00 - 0.04 K/UL    DF AUTOMATED     MAGNESIUM    Collection Time: 02/03/22  4:20 AM   Result Value Ref Range    Magnesium 1.9 1.6 - 2.6 mg/dL   INFLUENZA A & B AG (RAPID TEST)    Collection Time: 02/03/22  4:20 AM   Result Value Ref Range    Influenza A Antigen Negative NEG      Influenza B Antigen Negative NEG     CULTURE, BLOOD    Collection Time: 02/03/22  6:05 AM    Specimen: Blood   Result Value Ref Range    Special Requests: NO SPECIAL REQUESTS      Culture result: NO GROWTH AFTER 1 HOUR     CULTURE, BLOOD    Collection Time: 02/03/22  6:05 AM    Specimen: Blood   Result Value Ref Range    Special Requests: NO SPECIAL REQUESTS      Culture result: NO GROWTH AFTER 1 HOUR     TROPONIN-HIGH SENSITIVITY    Collection Time: 02/03/22  7:00 AM   Result Value Ref Range    Troponin-High Sensitivity 9 0 - 78 ng/L           No results for input(s): FIO2I, IFO2, HCO3I, IHCO3, HCOPOC, PCO2I, PCOPOC, IPHI, PHI, PHPOC, PO2I, PO2POC in the last 72 hours.     No lab exists for component: IPOC2    All Micro Results     Procedure Component Value Units Date/Time    Caryn Blanchard, URINE [[id number]] Collected: 02/03/22 0420    Order Status: Completed Specimen: Urine, random Updated: 02/03/22 1202     Strep pneumo Ag, urine Negative       LEGIONELLA PNEUMOPHILA AG, URINE [[id number]] Collected: 02/03/22 0420    Order Status: Completed Specimen: Urine, random Updated: 02/03/22 1201     Legionella Ag, urine Negative       CULTURE, BLOOD [[id number]] Collected: 02/03/22 2358    Order Status: Completed Specimen: Blood Updated: 02/03/22 0826     Special Requests: NO SPECIAL REQUESTS        Culture result: NO GROWTH AFTER 1 HOUR       CULTURE, BLOOD [[id number]] Collected: 02/03/22 0605    Order Status: Completed Specimen: Blood Updated: 02/03/22 0826     Special Requests: NO SPECIAL REQUESTS        Culture result: NO GROWTH AFTER 1 HOUR       INFLUENZA A & B AG (RAPID TEST) [[id number]] Collected: 02/03/22 0420    Order Status: Completed Specimen: Nasopharyngeal from Nasal washing Updated: 02/03/22 0456     Influenza A Antigen Negative        Comment: A negative result does not exclude influenza virus infection, seasonal or H1N1 due to suboptimal sensitivity. If influenza is circulating in your community, a diagnosis of influenza should be considered based on a patients clinical presentation and empiric antiviral treatment should be considered, if indicated. Influenza B Antigen Negative       COVID-19 RAPID TEST [[id number]] Collected: 02/03/22 0225    Order Status: Completed Specimen: Nasopharyngeal Updated: 02/03/22 0259     Specimen source Nasopharyngeal        COVID-19 rapid test Not detected        Comment: Rapid Abbott ID Now       Rapid NAAT:  The specimen is NEGATIVE for SARS-CoV-2, the novel coronavirus associated with COVID-19. Negative results should be treated as presumptive and, if inconsistent with clinical signs and symptoms or necessary for patient management, should be tested with an alternative molecular assay. Negative results do not preclude SARS-CoV-2 infection and should not be used as the sole basis for patient management decisions. This test has been authorized by the FDA under an Emergency Use Authorization (EUA) for use by authorized laboratories.    Fact sheet for Healthcare Providers: ConventionUpdate.co.nz  Fact sheet for Patients: ConventionUpdate.co.nz       Methodology: Isothermal Nucleic Acid Amplification         SARS-COV-2 BY ANKITA [[id number]] Collected: 02/03/22 0225    Order Status: Completed Updated: 02/03/22 0231            ECHO February 2021  Interpretation Summary    Result status: Final result  · Image quality for this study was suboptimal.  · Contrast used: DEFINITY. · Left Ventricle: Normal cavity size, wall thickness and systolic function (ejection fraction normal). The estimated EF is 50 - 55%. There is mild (grade 1) left ventricular diastolic dysfunction E'E= 10.05. Poor resolution of endocardial border. Can not comment on wall motion abnormality  · Tricuspid Valve: Tricuspid valve not well visualized. No stenosis. Tricuspid regurgitation is inadequate for estimation of right ventricular systolic pressure. Imaging:  [x]I have personally reviewed the patients chest radiographs images and report     Results from Hospital Encounter encounter on 02/02/22    XR CHEST PORT    Narrative  EXAM: CHEST RADIOGRAPH    CLINICAL INDICATION/HISTORY: sob  -Additional: None    COMPARISON: 9/30/2021    TECHNIQUE: Portable frontal view of the chest    _______________    FINDINGS:    SUPPORT DEVICES: None. HEART AND MEDIASTINUM: No appreciable cardiomegaly. Remaining mediastinal  contours within normal limits. LUNGS AND PLEURAL SPACES: Unusual pattern of scarring with calcification in the  right lung base is unchanged. Otherwise, the lungs are clear. No vascular  congestion. No effusions. BONY THORAX AND SOFT TISSUES: Unremarkable.    _______________    Impression  No active cardiopulmonary disease. Results from [place] encounter on 02/02/22    CTA CHEST W OR W WO CONT    Narrative  EXAM: CTA chest    INDICATION: Pain. Abnormal chest x-ray. COMPARISON: April 11, 2021. TECHNIQUE: Axial CT imaging from the thoracic inlet through the diaphragm with  intravenous contrast. Coronal and sagittal MIP reformats were generated.  Dose  reduction techniques used: automated exposure control, adjustment of the mAs  and/or kVp according to patient size, and iterative reconstruction techniques. Digital imaging and communications in Medicine (DICOM) format image data are  available to nonaffiliated external healthcare facilities or entities on a  secure, media free, reciprocally searchable basis with patient authorization for  at least 12 months after this study. _______________    FINDINGS:    EXAM QUALITY: Adequate. PULMONARY ARTERIES: No evidence of pulmonary embolism. MEDIASTINUM: Normal heart size. No evidence of right heart strain. Aorta is  unremarkable. No pericardial effusion. LUNGS: There is bilateral lower lobe atelectasis and/or scarring. There is  minimal right upper lobe scarring. There is a 2.5 cm pleural-based right  middle/right upper lobe chronic nodular density. PLEURA: There is a chronic loculated small right pleural effusion with  peripheral calcification similar to previous. AIRWAY: There is partial right lower lobe peripheral bronchial opacification. LYMPH NODES: No enlarged nodes. UPPER ABDOMEN: Unremarkable. OTHER: No acute or aggressive osseous abnormalities identified. _______________    Impression  1. No evidence of pulmonary embolism. 2.  Chronic loculated right pleural effusion. 3. Bilateral lower lobe and right upper lobe scarring and/or chronic  atelectasis. 4. Partial right lower lobe peripheral bronchial opacification. Ultrasound of the legs 10/1/2021  Interpretation Summary    · Acute non-occlusive thrombus present in the right proximal femoral vein. · No evidence of deep vein thrombosis in the left lower extremity.         IMPRESSION:   · Acute on chronic respiratory failure with hypoxemia  · COPD exacerbation  · Asbestos pleural calcifications  · Loculated right pleural effusion  · Right subpleural calcified mass  · Chronic kidney disease stage III  · Paroxysmal atrial fibrillation  · DVT right lower extremity-October 2021  ·   Patient Active Problem List   Diagnosis Code    Hypertension I10    Chronic obstructive pulmonary disease ([facility]) J44.9    Chronic renal disease, stage 3, moderately decreased glomerular filtration rate between 30-59 mL/min/1.73 square meter ([facility]) N18.30    Age-related physical debility R54    Acute on chronic respiratory failure with hypoxemia ([facility]) J96.21    PAF (paroxysmal atrial fibrillation) ([facility]) I48.0    Avascular necrosis of bone of left hip ([facility]) M87.052    Anemia D64.9    COPD with acute exacerbation ([facility]) J44.1    Acute exacerbation of chronic obstructive pulmonary disease (COPD) ([facility]) J44.1    Loculated pleural effusion J90    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 · Patient presently with COPD exacerbation; continue bronchodilator therapy-budesonide and Brovana nebulization twice a day; added DuoNeb 4 times daily. · Continue oxygen supplemental therapy-currently weaned to 3 L.  · IV steroids-Solu-Medrol 60 mg every 8 hours-wean based on clinical improvement. · Antibiotics-empiric ceftriaxone and IV azithromycin-complete 5 to 7 days therapy. · Anticoagulation-currently on Eliquis 5 mg twice daily; history of paroxysmal atrial fibrillation and right leg DVT [October 2021]; recheck ultrasound of the legs. · Mild CKD-creatinine stable. · Mild anemia-monitor while on Eliquis. · DVT prophylaxis reviewed. · PPI prophylaxis-pantoprazole. ·   · Prior PFTs reviewed-moderate/stage II COPD, with mild air trapping and mildly decreased diffusion capacity.   -PFT 6/29/20:  FEV1/FVC 58%, FEV1 1.4 L/54%, FVC 2.41 L/73%, %, %, DLCO 67%, DLCO %. Moderate obstruction. Air trapping. Reduced DLCO but normal DLCO VA. CTA chest reviewed  No PEs; bilateral emphysematous lung disease in the upper lobes; chronic pleural calcification; right lower lobe loculated pleural effusion with calcified pleural lining; chronic right lower lobe subpleural calcified mass anteriorly; right lung mild volume loss compared to left lung. Discussed with patient and updated management plans. CODE STATUS-full code. · Thank you for the consult      High complexity decision making was performed in this consultation and evaluation of this patient who is at high risk for decompensation with multiple organ involvement      Please note: Voice-recognition software may have been used to generate this report, which may have resulted in some phonetic-based errors in grammar and contents. Even though attempts were made to correct all the mistakes, some may have been missed, and remained in the body of the document.          Ellie Carballo MD

## 2022-02-03 NOTE — ED PROVIDER NOTES
EMERGENCY DEPARTMENT HISTORY AND PHYSICAL EXAM    Date: 2/2/2022  Patient Name: Abena Vincent    History of Presenting Illness     Chief Complaint   Patient presents with    Shortness of Breath         History Provided By: Patient and EMS    Additional History (Context):   9:27 PM  Abena Vincent is a 64 y.o. male with PMHX of COPD with many years of smoking use. He quit smoking about 10 years ago according the patient, paroxysmal A. 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 This gentleman is home O2 dependent at 2 L COPD exacerbation. He had a prior history of COVID    Social History  ***    Family History  ***    PCP: Conrad Mcadams MD    Current Outpatient Medications   Medication Sig Dispense Refill    chlorthalidone (HYGROTON) 25 mg tablet Take 25 mg by mouth daily.  traMADoL (ULTRAM) 50 mg tablet Take 50 mg by mouth every eight (8) hours as needed for Pain.  gabapentin (NEURONTIN) 100 mg capsule Take 100 mg by mouth three (3) times daily.  apixaban (ELIQUIS) 5 mg tablet Take 1 Tablet by mouth two (2) times a day. 60 Tablet 0    collagenase (SANTYL) 250 unit/gram ointment Apply  to affected area daily. 30 g 1    arformoteroL (BROVANA) 15 mcg/2 mL nebu neb solution 2 mL by Nebulization route two (2) times a day. 30 Vial 0    budesonide (PULMICORT) 0.5 mg/2 mL nbsp 2 mL by Nebulization route two (2) times a day. 30 Each 0    famotidine (PEPCID) 20 mg tablet Take 1 Tab by mouth daily. 30 Tab 0    ferrous sulfate 325 mg (65 mg iron) tablet Take 1 Tab by mouth two (2) times daily (with meals). 30 Tab 0    amLODIPine (NORVASC) 5 mg tablet Take 1 Tab by mouth daily.  30 Tab 0    albuterol-ipratropium (DUO-NEB) 2.5 mg-0.5 mg/3 ml nebu 3 mL by Nebulization route every four (4) hours as needed for Wheezing. (Patient not taking: Reported on 10/9/2021) 30 Nebule 0    albuterol (PROVENTIL HFA, VENTOLIN HFA, PROAIR HFA) 90 mcg/actuation inhaler Take 2 Puffs by inhalation every four (4) hours as needed for Wheezing or Shortness of Breath. 1 Inhaler 1    OXYGEN-AIR DELIVERY SYSTEMS 2 L/min by Nasal route continuous.  furosemide (Lasix) 20 mg tablet Take 20 mg by mouth daily.  dilTIAZem (CARDIZEM) 30 mg tablet Take 1 Tab by mouth Before breakfast, lunch, and dinner. 90 Tab 0    dextran 70/hypromellose (ARTIFICIAL TEARS, PF, OP) Administer 2 Drops to both eyes daily as needed for Other (dry eyes).  tamsulosin (FLOMAX) 0.4 mg capsule Take 0.4 mg by mouth every evening. Past History     Past Medical History:  Past Medical History:   Diagnosis Date    Brain aneurysm     Brain aneurysm     Cancer ([facility] [address]     prostate    [facility]     CKD (chronic kidney disease)     Closed nondisplaced supracondylar fracture of lower end of left femur without intracondylar extension with delayed healing     COPD (chronic obstructive pulmonary disease) ([address]     Debility     History of home oxygen therapy     [place] (hard of hearing)     Hypertension     Nocturia     On home oxygen therapy     PA[facility]paroxysmal atrial fibrillation) ([facility])     Pneumonia     Prostate CA ([address]     Prostate neoplasm     Radiation effect        Past Surgical History:  Past Surgical History:   Procedure Laterality Date    HX HERNIA REPAIR      HX HIP ARTHROSCOPY  04/09/2021       Family History:  Family History   Problem Relation Age of Onset    Heart Disease Mother        Social History:  Social History     Tobacco Use    Smoking status: Former Smoker     Types: Cigarettes    Smokeless tobacco: Never Used   Substance Use Topics    Alcohol use: No    Drug use: Never       Allergies:   Allergies   Allergen Reactions    Aspirin Other (comments)     \"messes my stomach up\"    Bactrim [Sulfamethoxazole-Trimethoprim] Unknown (comments)         Review of Systems   Review of Systems    ***  Physical Exam     Vitals:    02/02/22 2105 02/02/22 2107   BP: (!) 160/67    Pulse: (!) 102    Resp: 24    Temp: 98.2 °F (36.8 °C)    SpO2: 98% 100%   Weight: 84.1 kg (185 lb 6.5 oz)      Physical Exam    ***    Diagnostic Study Results     Labs -  Recent Results (from the past 24 hour(s))   CBC WITH AUTOMATED DIFF    Collection Time: 02/02/22  9:13 PM   Result Value Ref Range    WBC 14.7 (H) 4.6 - 13.2 K/uL    RBC 4.09 (L) 4.35 - 5.65 M/uL    HGB 10.9 (L) 13.0 - 16.0 g/dL    HCT 34.8 (L) 36.0 - 48.0 %    MCV 85.1 78.0 - 100.0 FL    MCH 26.7 24.0 - 34.0 PG    MCHC 31.3 31.0 - 37.0 g/dL    RDW 13.5 11.6 - 14.5 %    PLATELET 752 422 - 161 K/uL    MPV 9.0 (L) 9.2 - 11.8 FL    NRBC 0.0 0  WBC    ABSOLUTE NRBC 0.00 0.00 - 0.01 K/uL    NEUTROPHILS PENDING %    LYMPHOCYTES PENDING %    MONOCYTES PENDING %    EOSINOPHILS PENDING %    BASOPHILS PENDING %    IMMATURE GRANULOCYTES PENDING %    ABS. NEUTROPHILS PENDING K/UL    ABS. LYMPHOCYTES PENDING K/UL    ABS. MONOCYTES PENDING K/UL    ABS. EOSINOPHILS PENDING K/UL    ABS. BASOPHILS PENDING K/UL    ABS. IMM. GRANS. PENDING K/UL    DF PENDING         Radiologic Studies - ***  XR CHEST PORT    (Results Pending)     CT Results  (Last 48 hours)    None        CXR Results  (Last 48 hours)    None          Medications given in the ED-  Medications   albuterol-ipratropium (DUO-NEB) 2.5 MG-0.5 MG/3 ML (3 mL Nebulization Given 2/2/22 2119)   albuterol-ipratropium (DUO-NEB) 2.5 MG-0.5 MG/3 ML (3 mL Nebulization Given 2/2/22 2117)   methylPREDNISolone (PF) (Solu-MEDROL) injection 125 mg (125 mg IntraVENous Given 2/2/22 2117)         Medical Decision Making   I am the first provider for this patient. I reviewed the vital signs, available nursing notes, past medical history, past surgical history, family history and social history.     Vital Signs-Reviewed the patient's vital signs. Pulse Oximetry Analysis - ***% on ***     Cardiac Monitor:  Rate: *** bpm  Rhythm: ***    EKG interpretation: (Preliminary)  9:27 PM   ***  EKG read by Elif Junior MD      Records Reviewed: NURSING NOTES AND PREVIOUS MEDICAL RECORDS    Provider Notes (Medical Decision Making):   ***    Procedures:  Procedures    ED Course:   9:27 PM: Initial assessment performed. The patients presenting problems have been discussed, and they are in agreement with the care plan formulated and outlined with them. I have encouraged them to ask questions as they arise throughout their visit. Diagnosis and Disposition       DISCHARGE NOTE:  ***  Fabiana Park's  results have been reviewed with him. He has been counseled regarding his diagnosis, treatment, and plan. He verbally conveys understanding and agreement of the signs, symptoms, diagnosis, treatment and prognosis and additionally agrees to follow up as discussed. He also agrees with the care-plan and conveys that all of his questions have been answered. I have also provided discharge instructions for him that include: educational information regarding their diagnosis and treatment, and list of reasons why they would want to return to the ED prior to their follow-up appointment, should his condition change. He has been provided with education for proper emergency department utilization. CLINICAL IMPRESSION:    No diagnosis found. PLAN:  1. D/C Home  2. Current Discharge Medication List        3. Follow-up Information    None       _______________________________    This note was partially transcribed via voice recognition software. Although efforts have been made to catch any discrepancies, it may contain sound alike words, grammatical errors, or nonsensical words.

## 2022-02-03 NOTE — ED PROVIDER NOTES
EMERGENCY DEPARTMENT HISTORY AND PHYSICAL EXAM    Date: 2/2/2022  Patient Name: Josse Youssef    History of Presenting Illness     Chief Complaint   Patient presents with    Shortness of Breath         History Provided By: Patient and EMS    Additional History (Context):   9: 27 PM    Josse Youssef is a 64 y.o. male with PMHX of COPD with many years of smoking use. He quit smoking about 10 years ago according the patient, paroxysmal A. 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 This gentleman is home O2 dependent at 2 L COPD exacerbation. He had a prior history of COVID who presents to the emergency department C/O respiratory difficulty and COPD exacerbation. He was brought by paramedics with limited history but no onset of's breathing difficulty within the last day. Symptoms started earlier today and did not respond to home cough medicine. He had duo nebs as well as albuterol treatment in route. He did not have steroids he is a long-term smoker quit roughly 10 years ago. He denies current use of any steroids. He had a prolonged hospital stay for fever and COPD exacerbation in September 2021. He was diagnosed with COVID 3 weeks before that. Social History  Denies smoking drinking or drug    Family History  ***    PCP: Wanda Juarez MD    Current Outpatient Medications   Medication Sig Dispense Refill    chlorthalidone (HYGROTON) 25 mg tablet Take 25 mg by mouth daily.  traMADoL (ULTRAM) 50 mg tablet Take 50 mg by mouth every eight (8) hours as needed for Pain.  gabapentin (NEURONTIN) 100 mg capsule Take 100 mg by mouth three (3) times daily.  apixaban (ELIQUIS) 5 mg tablet Take 1 Tablet by mouth two (2) times a day.  60 Tablet 0    collagenase (SANTYL) 250 unit/gram ointment Apply  to affected area daily. 30 g 1    arformoteroL (BROVANA) 15 mcg/2 mL nebu neb solution 2 mL by Nebulization route two (2) times a day. 30 Vial 0    budesonide (PULMICORT) 0.5 mg/2 mL nbsp 2 mL by Nebulization route two (2) times a day. 30 Each 0    famotidine (PEPCID) 20 mg tablet Take 1 Tab by mouth daily. 30 Tab 0    ferrous sulfate 325 mg (65 mg iron) tablet Take 1 Tab by mouth two (2) times daily (with meals). 30 Tab 0    amLODIPine (NORVASC) 5 mg tablet Take 1 Tab by mouth daily. 30 Tab 0    albuterol-ipratropium (DUO-NEB) 2.5 mg-0.5 mg/3 ml nebu 3 mL by Nebulization route every four (4) hours as needed for Wheezing. (Patient not taking: Reported on 10/9/2021) 30 Nebule 0    albuterol (PROVENTIL HFA, VENTOLIN HFA, PROAIR HFA) 90 mcg/actuation inhaler Take 2 Puffs by inhalation every four (4) hours as needed for Wheezing or Shortness of Breath. 1 Inhaler 1    OXYGEN-AIR DELIVERY SYSTEMS 2 L/min by Nasal route continuous.  furosemide (Lasix) 20 mg tablet Take 20 mg by mouth daily.  dilTIAZem (CARDIZEM) 30 mg tablet Take 1 Tab by mouth Before breakfast, lunch, and dinner. 90 Tab 0    dextran 70/hypromellose (ARTIFICIAL TEARS, PF, OP) Administer 2 Drops to both eyes daily as needed for Other (dry eyes).  tamsulosin (FLOMAX) 0.4 mg capsule Take 0.4 mg by mouth every evening.          Past History     Past Medical History:  Past Medical History:   Diagnosis Date    Brain aneurysm     Brain aneurysm     Cancer ([facility] [address]     prostate    CHF (congestive heart failure) ([facility])     CKD (chronic kidney disease)     Closed nondisplaced supracondylar fracture of lower end of left femur without intracondylar extension with delayed healing     COPD (chronic obstructive pulmonary disease) ([address]     Debility     History of home oxygen therapy     [place] (hard of hearing)     Hypertension     Nocturia     On home oxygen therapy     PAF (paroxysmal atrial fibrillation) ([address]     Pneumonia     Prostate CA ([address]  Prostate neoplasm     Radiation effect        Past Surgical History:  Past Surgical History:   Procedure Laterality Date    HX HERNIA REPAIR      HX HIP ARTHROSCOPY  04/09/2021       Family History:  Family History   Problem Relation Age of Onset    Heart Disease Mother        Social History:  Social History     Tobacco Use    Smoking status: Former Smoker     Types: Cigarettes    Smokeless tobacco: Never Used   Substance Use Topics    Alcohol use: No    Drug use: Never       Allergies: Allergies   Allergen Reactions    Aspirin Other (comments)     \"messes my stomach up\"    Bactrim [Sulfamethoxazole-Trimethoprim] Unknown (comments)         Review of Systems   Review of Systems   Constitutional: Positive for activity change and fatigue. HENT: Negative. Respiratory: Positive for cough and shortness of breath. Cardiovascular: Negative for chest pain. Gastrointestinal: Positive for nausea. All other systems reviewed and are negative.       ***  Physical Exam     Vitals:    02/02/22 2105 02/02/22 2107 02/02/22 2200   BP: (!) 160/67  (!) 123/58   Pulse: (!) 102  (!) 105   Resp: 24  23   Temp: 98.2 °F (36.8 °C)     SpO2: 98% 100%    Weight: 84.1 kg (185 lb 6.5 oz)       Physical Exam    ***    Diagnostic Study Results     Labs -  Recent Results (from the past 24 hour(s))   CBC WITH AUTOMATED DIFF    Collection Time: 02/02/22  9:13 PM   Result Value Ref Range    WBC 14.7 (H) 4.6 - 13.2 K/uL    RBC 4.09 (L) 4.35 - 5.65 M/uL    HGB 10.9 (L) 13.0 - 16.0 g/dL    HCT 34.8 (L) 36.0 - 48.0 %    MCV 85.1 78.0 - 100.0 FL    MCH 26.7 24.0 - 34.0 PG    MCHC 31.3 31.0 - 37.0 g/dL    RDW 13.5 11.6 - 14.5 %    PLATELET 151 499 - 668 K/uL    MPV 9.0 (L) 9.2 - 11.8 FL    NRBC 0.0 0  WBC    ABSOLUTE NRBC 0.00 0.00 - 0.01 K/uL    NEUTROPHILS 70 40 - 73 %    LYMPHOCYTES 7 (L) 21 - 52 %    MONOCYTES 7 3 - 10 %    EOSINOPHILS 14 (H) 0 - 5 %    BASOPHILS 1 0 - 2 %    IMMATURE GRANULOCYTES 1 (H) 0.0 - 0.5 % ABS. NEUTROPHILS 10.4 (H) 1.8 - 8.0 K/UL    ABS. LYMPHOCYTES 1.0 0.9 - 3.6 K/UL    ABS. MONOCYTES 1.0 0.05 - 1.2 K/UL    ABS. EOSINOPHILS 2.1 (H) 0.0 - 0.4 K/UL    ABS. BASOPHILS 0.1 0.0 - 0.1 K/UL    ABS. IMM. GRANS. 0.1 (H) 0.00 - 0.04 K/UL    DF AUTOMATED      PLATELET COMMENTS LARGE PLATELETS      RBC COMMENTS ANISOCYTOSIS  SIGHT       METABOLIC PANEL, COMPREHENSIVE    Collection Time: 02/02/22  9:13 PM   Result Value Ref Range    Sodium 138 136 - 145 mmol/L    Potassium 3.6 3.5 - 5.5 mmol/L    Chloride 106 100 - 111 mmol/L    CO2 25 21 - 32 mmol/L    Anion gap 7 3.0 - 18 mmol/L    Glucose 134 (H) 74 - 99 mg/dL    BUN 28 (H) 7.0 - 18 MG/DL    Creatinine 1.37 (H) 0.6 - 1.3 MG/DL    BUN/Creatinine ratio 20 12 - 20      GFR est AA >60 >60 ml/min/1.73m2    GFR est non-AA 50 (L) >60 ml/min/1.73m2    Calcium 8.8 8.5 - 10.1 MG/DL    Bilirubin, total 0.3 0.2 - 1.0 MG/DL    ALT (SGPT) 12 (L) 16 - 61 U/L    AST (SGOT) 12 10 - 38 U/L    Alk. phosphatase 112 45 - 117 U/L    Protein, total 6.9 6.4 - 8.2 g/dL    Albumin 3.0 (L) 3.4 - 5.0 g/dL    Globulin 3.9 2.0 - 4.0 g/dL    A-G Ratio 0.8 0.8 - 1.7     NT-PRO BNP    Collection Time: 02/02/22  9:13 PM   Result Value Ref Range    NT pro- 0 - 1,800 PG/ML   TROPONIN-HIGH SENSITIVITY    Collection Time: 02/02/22  9:13 PM   Result Value Ref Range    Troponin-High Sensitivity 8 0 - 78 ng/L        Radiologic Studies - ***  XR CHEST PORT   Final Result      No active cardiopulmonary disease. CT Results  (Last 48 hours)    None        CXR Results  (Last 48 hours)               02/02/22 2133  XR CHEST PORT Final result    Impression:      No active cardiopulmonary disease. Narrative:  EXAM: CHEST RADIOGRAPH       CLINICAL INDICATION/HISTORY: sob   -Additional: None       COMPARISON: 9/30/2021       TECHNIQUE: Portable frontal view of the chest       _______________       FINDINGS:       SUPPORT DEVICES: None. HEART AND MEDIASTINUM: No appreciable cardiomegaly. Remaining mediastinal   contours within normal limits. LUNGS AND PLEURAL SPACES: Unusual pattern of scarring with calcification in the   right lung base is unchanged. Otherwise, the lungs are clear. No vascular   congestion. No effusions. BONY THORAX AND SOFT TISSUES: Unremarkable.       _______________                 Medications given in the ED-  Medications   albuterol-ipratropium (DUO-NEB) 2.5 MG-0.5 MG/3 ML (3 mL Nebulization Given 2/2/22 2119)   albuterol-ipratropium (DUO-NEB) 2.5 MG-0.5 MG/3 ML (3 mL Nebulization Given 2/2/22 2117)   methylPREDNISolone (PF) (Solu-MEDROL) injection 125 mg (125 mg IntraVENous Given 2/2/22 2117)         Medical Decision Making   I am the first provider for this patient. I reviewed the vital signs, available nursing notes, past medical history, past surgical history, family history and social history. Vital Signs-Reviewed the patient's vital signs. Pulse Oximetry Analysis - ***% on ***     Cardiac Monitor:  Rate: *** bpm  Rhythm: ***    EKG interpretation: (Preliminary)  12:39 AM   ***  EKG read by Stephen Sexton MD      Records Reviewed: NURSING NOTES AND PREVIOUS MEDICAL RECORDS    Provider Notes (Medical Decision Making):   Patient O2 dependent COPD exacerbation. He is get sinus tachycardia at 183. His white count is elevated although he denies any current use of steroids. Chest x-ray shows asymmetric findings with lesions strongly suspicious for new malignancy or pneumonia however in comparing old films this looks to be under greater density of old changes. Formal reads in the past suggest chronic interstitial disease    Procedures:  Procedures    ED Course:   12:39 AM: Initial assessment performed. The patients presenting problems have been discussed, and they are in agreement with the care plan formulated and outlined with them. I have encouraged them to ask questions as they arise throughout their visit.     Diagnosis and Disposition DISCHARGE NOTE:  ***  Chiki Park's  results have been reviewed with him. He has been counseled regarding his diagnosis, treatment, and plan. He verbally conveys understanding and agreement of the signs, symptoms, diagnosis, treatment and prognosis and additionally agrees to follow up as discussed. He also agrees with the care-plan and conveys that all of his questions have been answered. I have also provided discharge instructions for him that include: educational information regarding their diagnosis and treatment, and list of reasons why they would want to return to the ED prior to their follow-up appointment, should his condition change. He has been provided with education for proper emergency department utilization. CLINICAL IMPRESSION:    No diagnosis found. PLAN:  1. D/C Home  2. Current Discharge Medication List        3. Follow-up Information    None       _______________________________    This note was partially transcribed via voice recognition software. Although efforts have been made to catch any discrepancies, it may contain sound alike words, grammatical errors, or nonsensical words.

## 2022-02-03 NOTE — H&P
History & Physical    Patient: Oswaldo Villanueva MRN: [id number]  [facility]: [id number]    YOB: 1945  Age: 64 y.o. Sex: male      DOA: 2/2/2022    Chief Complaint:   Chief Complaint   Patient presents with    Shortness of Breath          HPI:     Oswaldo Villanueva is a 64 y.o.  male who has history of COPD on home oxygen, hard of hearing, congestive heart failure, paroxysmal atrial fib, DVT, prostate cancer presents to the emergency room with shortness of breath that has been progressive over the last few days. Patient has history of COVID-19 back in September and has remained on home oxygen. In the emergency room he was given duo nebs steroids with no improvement of his wheezing and respiratory rate. You required no increase of oxygen however it was thought that he should be admitted to due to increased respiratory rate and failure to improve after DuoNeb and steroids.   Patient has had 2 out of 3 vaccines but suspect Covid no new Covid exposures  History is limited as he is extremely hard of hearing attempted to call daughter abdomen is spouse no answer left voice message    Past Medical History:   Diagnosis Date    Brain aneurysm     Brain aneurysm     Cancer ([address]     prostate    CHF (congestive heart failure) ([address]     CKD (chronic kidney disease)     Closed nondisplaced supracondylar fracture of lower end of left femur without intracondylar extension with delayed healing     COPD (chronic obstructive pulmonary disease) ([address]     Debility     History of home oxygen therapy     [place] (hard of hearing)     Hypertension     Nocturia     On home oxygen therapy     PAF (paroxysmal atrial fibrillation) ([address]     Pneumonia     Prostate CA ([address]     Prostate neoplasm     Radiation effect        Past Surgical History:   Procedure Laterality Date    HX HERNIA REPAIR      HX HIP ARTHROSCOPY  04/09/2021       Family History   Problem Relation Age of Onset    Heart Disease Mother        Social History     Socioeconomic History    Marital status:    Tobacco Use    Smoking status: Former Smoker     Types: Cigarettes    Smokeless tobacco: Never Used   Substance and Sexual Activity    Alcohol use: No    Drug use: Never       Prior to Admission medications    Medication Sig Start Date End Date Taking? Authorizing Provider   chlorthalidone (HYGROTON) 25 mg tablet Take 25 mg by mouth daily. Provider, Historical   traMADoL (ULTRAM) 50 mg tablet Take 50 mg by mouth every eight (8) hours as needed for Pain. Provider, Historical   gabapentin (NEURONTIN) 100 mg capsule Take 100 mg by mouth three (3) times daily. Provider, Historical   apixaban (ELIQUIS) 5 mg tablet Take 1 Tablet by mouth two (2) times a day. 10/7/21   Elsa Arceo MD   collagenase (SANTYL) 250 unit/gram ointment Apply  to affected area daily. 6/23/21   Elsa Arceo MD   arformoteroL (BROVANA) 15 mcg/2 mL nebu neb solution 2 mL by Nebulization route two (2) times a day. 4/14/21   Vance Lozano MD   budesonide (PULMICORT) 0.5 mg/2 mL nbsp 2 mL by Nebulization route two (2) times a day. 4/14/21   Vance Lozano MD   famotidine (PEPCID) 20 mg tablet Take 1 Tab by mouth daily. 4/14/21   Vance Loznao MD   ferrous sulfate 325 mg (65 mg iron) tablet Take 1 Tab by mouth two (2) times daily (with meals). 4/14/21   Vance Lozano MD   amLODIPine (NORVASC) 5 mg tablet Take 1 Tab by mouth daily. 3/3/21   Sol Zapata MD   albuterol-ipratropium (DUO-NEB) 2.5 mg-0.5 mg/3 ml nebu 3 mL by Nebulization route every four (4) hours as needed for Wheezing. Patient not taking: Reported on 10/9/2021 2/7/21   Johana Jordan MD   albuterol (PROVENTIL HFA, VENTOLIN HFA, PROAIR HFA) 90 mcg/actuation inhaler Take 2 Puffs by inhalation every four (4) hours as needed for Wheezing or Shortness of Breath. 2/7/21   Johana Jordan MD   OXYGEN-AIR DELIVERY SYSTEMS 2 L/min by Nasal route continuous.     Provider, Historical   furosemide (Lasix) 20 mg tablet Take 20 mg by mouth daily. Provider, Historical   dilTIAZem (CARDIZEM) 30 mg tablet Take 1 Tab by mouth Before breakfast, lunch, and dinner. 20   Jade Pickett MD   dextran 70/hypromellose (ARTIFICIAL TEARS, PF, OP) Administer 2 Drops to both eyes daily as needed for Other (dry eyes). Provider, Historical   tamsulosin (FLOMAX) 0.4 mg capsule Take 0.4 mg by mouth every evening. Other, MD Blayne       Allergies   Allergen Reactions    Aspirin Other (comments)     \"messes my stomach up\"    Bactrim [Sulfamethoxazole-Trimethoprim] Unknown (comments)         Review of Systems  Limited due to hearing failure  Patient has had worsening dyspnea denies chest pain      Physical Exam:     Physical Exam:  Visit Vitals  BP (!) 123/58   Pulse (!) 105   Temp 98.2 °F (36.8 °C)   Resp 23   Wt 84.1 kg (185 lb 6.5 oz)   SpO2 100%   BMI 27.38 kg/m²    O2 Flow Rate (L/min): 2 l/min O2 Device: Nasal cannula    Temp (24hrs), Av.2 °F (36.8 °C), Min:98.2 °F (36.8 °C), Max:98.2 °F (36.8 °C)    No intake/output data recorded. No intake/output data recorded. General:  Alert, cooperative, no distress, appears stated age on nasal cannula no accessory muscle use. Head: Normocephalic, without obvious abnormality, atraumatic. Eyes:  Conjunctivae/corneas clear. PERRL, EOMs intact. Nose: Nares normal. No drainage or sinus tenderness. Neck: Supple, symmetrical, trachea midline, no adenopathy, thyroid: no enlargement, no carotid bruit and no JVD. Lungs:   Clear to auscultation bilaterally. Diffuse expiratory wheeze   Heart:  Regular rate and rhythm, S1, S2 normal.  Tachycardic with ectopy     Abdomen: Soft, non-tender. Bowel sounds normal.    Extremities: Extremities normal, atraumatic, no cyanosis or edema. Pulses: +1+ and symmetric all extremities.    Skin:  No rashes or lesions there are no open wounds on his foot that I could see socks removed   Neurologic:  Patient is awake and follows simple command is able to raise his leg. Labs Reviewed: All lab results for the last 24 hours reviewed. and EKG    Procedures/imaging: see electronic medical records for all procedures/Xrays and details which were not copied into this note but were reviewed prior to creation of Plan      Assessment/Plan     Active Problems:  1.. COPD exacerbation  CT scan is ordered he started on duo nebs Brovana Pulmicort IV steroids urine Legionella and streptococcal antigens ordered  Rapid Covid is negative we will add a influenza antigen    2. Paroxysmal A. fib and history of DVT I continued him on his Eliquis rate control will go with diltiazem    3. History of CHF seems compensated a repeat echo and troponin is ordered  DVT/GI Prophylaxis: Eliquis and Protonix    4.  Extremely hard of hearing which makes medication very difficult family needs to be contacted I spoke to  of his daughter who was asleep when I called    5..  Chronic kidney disease adjust diuretics for creatinine his Lasix is changed to IV  addendum  6..  Loculated pleural effusion  We will continue with Rocephin and Zithromax for now and get a pulmonary consult as well as an echo  Juan Carlos Kent MD  2/3/2022 12:47 AM

## 2022-02-03 NOTE — PROGRESS NOTES
Care Management    Reason for Admission: COPD with acute exacerbation    Chart reviewed. Per H&P: \" Joon Sims is a 64 y.o.  male who has history of COPD on home oxygen, hard of hearing, congestive heart failure, paroxysmal atrial fib, DVT, prostate cancer presents to the emergency room with shortness of breath that has been progressive over the last few days. Patient has history of COVID-19 back in September and has remained on home oxygen. In the emergency room he was given duo nebs steroids with no improvement of his wheezing and respiratory rate. You required no increase of oxygen however it was thought that he should be admitted to due to increased respiratory rate and failure to improve after DuoNeb and steroids. Patient has had 2 out of 3 vaccines but suspect Covid no new Covid exposures  History is limited as he is extremely hard of hearing attempted to call daughter abdomen is spouse no answer left voice message         Prior to admission patient was living: with spouse    Prior to admission patient was using the following DME:       On home O2 , walker and cane           RUR Score:   22%       COVID Vaccine Status:              PCP: First and Last name: Gilberto Melgoza MD    Name of Practice:    Are you a current patient: Yes/No: Yes   Approximate date of last visit: 3-4 months ago   Can you participate in a virtual visit if needed:     Do you (patient/family) have any concerns for transition/discharge?    none              Plan for utilizing home health:TBD       Current Advanced Directive/Advance Care Plan: Full Code     Healthcare Decision Maker:   [facility] - [phone]    Secondary Decision Maker:  Willie Roberts - Daughter - [phone]  Click here to complete [address] including selection of the [address] Relationship (ie \"Primary\")            Transition of Care Plan: In progress     CM spoke with patient at bedside, patient confirmed contact info, primary contact, PCP whom she saw 3-4 months ago. Patient usees a cane and walker at home, uses home oxygen from [address] (2 L at home) and states he is here \"Because I have a breathing problem. I have COPD. \" Patient is extremely hard of hearing and needs staff to be very close in order to hear. The patient states he lives with his spouse. Care Management Interventions  PCP Verified by CM:  Yes  Last Visit to PCP: 10/01/21  Transition of Care Consult (CM Consult): Discharge Planning  Support Systems: Spouse/Significant Other  Confirm Follow Up Transport: Family  Discharge Location  Patient Expects to be Discharged to[de-identified]  (discharge home with physician follow up and family assistance)

## 2022-02-04 ENCOUNTER — HOSPITAL ENCOUNTER (INPATIENT)
Dept: VASCULAR SURGERY | Age: 77
Discharge: HOME | DRG: 191 | End: 2022-02-04
Attending: INTERNAL MEDICINE
Payer: MEDICARE

## 2022-02-04 LAB
ALBUMIN SERPL-MCNC: 2.3 G/DL (ref 3.4–5)
ALBUMIN/GLOB SERPL: 0.5 {RATIO} (ref 0.8–1.7)
ALP SERPL-CCNC: 82 U/L (ref 45–117)
ALT SERPL-CCNC: 11 U/L (ref 16–61)
ANION GAP SERPL CALC-SCNC: 8 MMOL/L (ref 3–18)
AST SERPL-CCNC: 7 U/L (ref 10–38)
BASOPHILS # BLD: 0 K/UL (ref 0–0.1)
BASOPHILS NFR BLD: 0 % (ref 0–2)
BILIRUB SERPL-MCNC: 0.4 MG/DL (ref 0.2–1)
BUN SERPL-MCNC: 41 MG/DL (ref 7–18)
BUN/CREAT SERPL: 27 (ref 12–20)
CALCIUM SERPL-MCNC: 8.4 MG/DL (ref 8.5–10.1)
CHLORIDE SERPL-SCNC: 106 MMOL/L (ref 100–111)
CO2 SERPL-SCNC: 24 MMOL/L (ref 21–32)
CREAT SERPL-MCNC: 1.5 MG/DL (ref 0.6–1.3)
DIFFERENTIAL METHOD BLD: ABNORMAL
EOSINOPHIL # BLD: 0 K/UL (ref 0–0.4)
EOSINOPHIL NFR BLD: 0 % (ref 0–5)
ERYTHROCYTE [DISTWIDTH] IN BLOOD BY AUTOMATED COUNT: 13.4 % (ref 11.6–14.5)
GLOBULIN SER CALC-MCNC: 4.2 G/DL (ref 2–4)
GLUCOSE SERPL-MCNC: 157 MG/DL (ref 74–99)
HCT VFR BLD AUTO: 29.9 % (ref 36–48)
HGB BLD-MCNC: 9.5 G/DL (ref 13–16)
IMM GRANULOCYTES # BLD AUTO: 0.1 K/UL (ref 0–0.04)
IMM GRANULOCYTES NFR BLD AUTO: 1 % (ref 0–0.5)
LYMPHOCYTES # BLD: 0.4 K/UL (ref 0.9–3.6)
LYMPHOCYTES NFR BLD: 4 % (ref 21–52)
MAGNESIUM SERPL-MCNC: 1.9 MG/DL (ref 1.6–2.6)
MCH RBC QN AUTO: 27.3 PG (ref 24–34)
MCHC RBC AUTO-ENTMCNC: 31.8 G/DL (ref 31–37)
MCV RBC AUTO: 85.9 FL (ref 78–100)
MONOCYTES # BLD: 0.2 K/UL (ref 0.05–1.2)
MONOCYTES NFR BLD: 2 % (ref 3–10)
NEUTS SEG # BLD: 8.8 K/UL (ref 1.8–8)
NEUTS SEG NFR BLD: 93 % (ref 40–73)
NRBC # BLD: 0 K/UL (ref 0–0.01)
NRBC BLD-RTO: 0 PER 100 WBC
PLATELET # BLD AUTO: 336 K/UL (ref 135–420)
PMV BLD AUTO: 10 FL (ref 9.2–11.8)
POTASSIUM SERPL-SCNC: 3.6 MMOL/L (ref 3.5–5.5)
PROT SERPL-MCNC: 6.5 G/DL (ref 6.4–8.2)
RBC # BLD AUTO: 3.48 M/UL (ref 4.35–5.65)
SODIUM SERPL-SCNC: 138 MMOL/L (ref 136–145)
WBC # BLD AUTO: 9.5 K/UL (ref 4.6–13.2)

## 2022-02-04 PROCEDURE — 74011250637 HC RX REV CODE- 250/637: Performed by: INTERNAL MEDICINE

## 2022-02-04 PROCEDURE — 94640 AIRWAY INHALATION TREATMENT: CPT

## 2022-02-04 PROCEDURE — 74011250636 HC RX REV CODE- 250/636: Performed by: INTERNAL MEDICINE

## 2022-02-04 PROCEDURE — 83735 ASSAY OF MAGNESIUM: CPT

## 2022-02-04 PROCEDURE — 74011000258 HC RX REV CODE- 258: Performed by: INTERNAL MEDICINE

## 2022-02-04 PROCEDURE — 74011250636 HC RX REV CODE- 250/636: Performed by: HOSPITALIST

## 2022-02-04 PROCEDURE — 93970 EXTREMITY STUDY: CPT

## 2022-02-04 PROCEDURE — 74011000250 HC RX REV CODE- 250: Performed by: INTERNAL MEDICINE

## 2022-02-04 PROCEDURE — 65660000000 HC RM CCU STEPDOWN

## 2022-02-04 PROCEDURE — 36415 COLL VENOUS BLD VENIPUNCTURE: CPT

## 2022-02-04 PROCEDURE — 80053 COMPREHEN METABOLIC PANEL: CPT

## 2022-02-04 PROCEDURE — 77010033678 HC OXYGEN DAILY

## 2022-02-04 PROCEDURE — 94760 N-INVAS EAR/PLS OXIMETRY 1: CPT

## 2022-02-04 PROCEDURE — 85025 COMPLETE CBC W/AUTO DIFF WBC: CPT

## 2022-02-04 RX ORDER — IPRATROPIUM BROMIDE AND ALBUTEROL SULFATE 2.5; .5 MG/3ML; MG/3ML
3 SOLUTION RESPIRATORY (INHALATION)
Status: DISCONTINUED | OUTPATIENT
Start: 2022-02-04 | End: 2022-02-05 | Stop reason: SDUPTHER

## 2022-02-04 RX ADMIN — IPRATROPIUM BROMIDE AND ALBUTEROL SULFATE 3 ML: .5; 2.5 SOLUTION RESPIRATORY (INHALATION) at 07:35

## 2022-02-04 RX ADMIN — SODIUM CHLORIDE 1 G: 900 INJECTION INTRAVENOUS at 02:44

## 2022-02-04 RX ADMIN — TAMSULOSIN HYDROCHLORIDE 0.4 MG: 0.4 CAPSULE ORAL at 17:47

## 2022-02-04 RX ADMIN — GABAPENTIN 100 MG: 100 CAPSULE ORAL at 09:40

## 2022-02-04 RX ADMIN — SODIUM CHLORIDE, PRESERVATIVE FREE 10 ML: 5 INJECTION INTRAVENOUS at 14:13

## 2022-02-04 RX ADMIN — ARFORMOTEROL TARTRATE 15 MCG: 15 SOLUTION RESPIRATORY (INHALATION) at 07:35

## 2022-02-04 RX ADMIN — APIXABAN 5 MG: 5 TABLET, FILM COATED ORAL at 09:40

## 2022-02-04 RX ADMIN — METHYLPREDNISOLONE SODIUM SUCCINATE 60 MG: 40 INJECTION, POWDER, FOR SOLUTION INTRAMUSCULAR; INTRAVENOUS at 06:15

## 2022-02-04 RX ADMIN — GABAPENTIN 100 MG: 100 CAPSULE ORAL at 16:38

## 2022-02-04 RX ADMIN — APIXABAN 5 MG: 5 TABLET, FILM COATED ORAL at 22:02

## 2022-02-04 RX ADMIN — FUROSEMIDE 20 MG: 10 INJECTION INTRAMUSCULAR; INTRAVENOUS at 09:40

## 2022-02-04 RX ADMIN — GABAPENTIN 100 MG: 100 CAPSULE ORAL at 22:02

## 2022-02-04 RX ADMIN — AMLODIPINE BESYLATE 5 MG: 5 TABLET ORAL at 09:40

## 2022-02-04 RX ADMIN — METHYLPREDNISOLONE SODIUM SUCCINATE 60 MG: 40 INJECTION, POWDER, FOR SOLUTION INTRAMUSCULAR; INTRAVENOUS at 14:13

## 2022-02-04 RX ADMIN — AZITHROMYCIN MONOHYDRATE 500 MG: 500 INJECTION, POWDER, LYOPHILIZED, FOR SOLUTION INTRAVENOUS at 01:33

## 2022-02-04 RX ADMIN — PANTOPRAZOLE SODIUM 40 MG: 40 TABLET, DELAYED RELEASE ORAL at 09:40

## 2022-02-04 RX ADMIN — BUDESONIDE 500 MCG: 0.5 INHALANT RESPIRATORY (INHALATION) at 19:58

## 2022-02-04 RX ADMIN — IPRATROPIUM BROMIDE AND ALBUTEROL SULFATE 3 ML: .5; 2.5 SOLUTION RESPIRATORY (INHALATION) at 00:35

## 2022-02-04 RX ADMIN — BUDESONIDE 500 MCG: 0.5 INHALANT RESPIRATORY (INHALATION) at 07:35

## 2022-02-04 RX ADMIN — METHYLPREDNISOLONE SODIUM SUCCINATE 40 MG: 40 INJECTION, POWDER, FOR SOLUTION INTRAMUSCULAR; INTRAVENOUS at 22:02

## 2022-02-04 RX ADMIN — ARFORMOTEROL TARTRATE 15 MCG: 15 SOLUTION RESPIRATORY (INHALATION) at 19:58

## 2022-02-04 RX ADMIN — IPRATROPIUM BROMIDE AND ALBUTEROL SULFATE 3 ML: .5; 2.5 SOLUTION RESPIRATORY (INHALATION) at 11:38

## 2022-02-04 RX ADMIN — SODIUM CHLORIDE, PRESERVATIVE FREE 10 ML: 5 INJECTION INTRAVENOUS at 22:00

## 2022-02-04 NOTE — PROGRESS NOTES
[facility]  Pulmonary, Critical Care, and Sleep Medicine    Pulmonary follow-up    Name: Ted Severino MRN: [id number]   : 1945 Hospital: [facility] [place]   Date: 2022  Room: [facility]     Subjective: This patient has been seen and evaluated at the request of Dr. Tammi Moseley for routine pulmonary consultation. Patient is a 64 y.o. male with history of COPD, on home O2. Patient sees Dr. Melvin Jimenez in our office, and was last seen 2021. Patient has history of COPD, prior FEV1 of 54%, on chronic home O2 at 2 L. He is also a former smoker, and has history of asbestos exposure. He has history of pleural calcifications, chronic right subpleural mass. He also has a history of atrial fibrillation, prostate cancer, chronic kidney disease. Patient has been admitted with worsening shortness of breath, cough and wheezing symptoms. He received nebulizers and steroids in the ER. He reports improvement in his breathing. 2022  Patient seen in telemetry unit. He reports improved breathing. Cough is less. No chest pain or palpitations. Severe hard of hearing.     S/p Covid vaccination-does not remember name.       Past Medical History:   Diagnosis Date    Brain aneurysm     Brain aneurysm     Cancer ([address]     prostate    CHF (congestive heart failure) (HCC)     CKD (chronic kidney disease)     Closed nondisplaced supracondylar fracture of lower end of left femur without intracondylar extension with delayed healing     COPD (chronic obstructive pulmonary disease) ([address]     Debility     History of home oxygen therapy     [place] (hard of hearing)     Hypertension     Nocturia     On home oxygen therapy     PAF (paroxysmal atrial fibrillation) ([address]     Pneumonia     Prostate CA ([address]     Prostate neoplasm     Radiation effect       Past Surgical History:   Procedure Laterality Date    HX HERNIA REPAIR      HX HIP ARTHROSCOPY  2021      Prior to Admission medications    Medication Sig Start Date End Date Taking? Authorizing Provider   chlorthalidone (HYGROTON) 25 mg tablet Take 25 mg by mouth daily. Provider, Historical   traMADoL (ULTRAM) 50 mg tablet Take 50 mg by mouth every eight (8) hours as needed for Pain. Provider, Historical   gabapentin (NEURONTIN) 100 mg capsule Take 100 mg by mouth three (3) times daily. Provider, Historical   apixaban (ELIQUIS) 5 mg tablet Take 1 Tablet by mouth two (2) times a day. 10/7/21   Payton Raymond MD   collagenase (SANTYL) 250 unit/gram ointment Apply  to affected area daily. 6/23/21   Payton Raymond MD   arformoteroL (BROVANA) 15 mcg/2 mL nebu neb solution 2 mL by Nebulization route two (2) times a day. 4/14/21   Talisha Vincent MD   budesonide (PULMICORT) 0.5 mg/2 mL nbsp 2 mL by Nebulization route two (2) times a day. 4/14/21   Talisha Vincent MD   famotidine (PEPCID) 20 mg tablet Take 1 Tab by mouth daily. 4/14/21   Talisha Vincent MD   ferrous sulfate 325 mg (65 mg iron) tablet Take 1 Tab by mouth two (2) times daily (with meals). 4/14/21   Talisha Vincent MD   amLODIPine (NORVASC) 5 mg tablet Take 1 Tab by mouth daily. 3/3/21   Berna Zapata MD   albuterol-ipratropium (DUO-NEB) 2.5 mg-0.5 mg/3 ml nebu 3 mL by Nebulization route every four (4) hours as needed for Wheezing. Patient not taking: Reported on 10/9/2021 2/7/21   Scott-Brown, Porter Lundborg, MD   albuterol (PROVENTIL HFA, VENTOLIN HFA, PROAIR HFA) 90 mcg/actuation inhaler Take 2 Puffs by inhalation every four (4) hours as needed for Wheezing or Shortness of Breath. 2/7/21   Scott-Brown, Porter Lundborg, MD   OXYGEN-AIR DELIVERY SYSTEMS 2 L/min by Nasal route continuous. Provider, Historical   furosemide (Lasix) 20 mg tablet Take 20 mg by mouth daily. Provider, Historical   dilTIAZem (CARDIZEM) 30 mg tablet Take 1 Tab by mouth Before breakfast, lunch, and dinner.  4/14/20   Talisha Vincent MD   dextran 70/hypromellose (ARTIFICIAL TEARS, PF, OP) Administer 2 Drops to both eyes daily as needed for Other (dry eyes). Provider, Historical   tamsulosin (FLOMAX) 0.4 mg capsule Take 0.4 mg by mouth every evening.     Other, MD Blayne     Allergies   Allergen Reactions    Aspirin Other (comments)     \"messes my stomach up\"    Bactrim [Sulfamethoxazole-Trimethoprim] Unknown (comments)      Social History     Tobacco Use    Smoking status: Former Smoker     Types: Cigarettes    Smokeless tobacco: Never Used   Substance Use Topics    Alcohol use: No      Family History   Problem Relation Age of Onset    Heart Disease Mother         Current Facility-Administered Medications   Medication Dose Route Frequency    methylPREDNISolone (PF) (SOLU-MEDROL) injection 40 mg  40 mg IntraVENous Q8H    sodium chloride (NS) flush 5-40 mL  5-40 mL IntraVENous Q8H    amLODIPine (NORVASC) tablet 5 mg  5 mg Oral DAILY    apixaban (ELIQUIS) tablet 5 mg  5 mg Oral BID    gabapentin (NEURONTIN) capsule 100 mg  100 mg Oral TID    tamsulosin (FLOMAX) capsule 0.4 mg  0.4 mg Oral QPM    azithromycin (ZITHROMAX) 500 mg in 0.9% sodium chloride 250 mL (VIAL-MATE)  500 mg IntraVENous Q24H    cefTRIAXone (ROCEPHIN) 1 g in 0.9% sodium chloride (MBP/ADV) 50 mL MBP  1 g IntraVENous Q24H    arformoteroL (BROVANA) neb solution 15 mcg  15 mcg Nebulization BID RT    budesonide (PULMICORT) 500 mcg/2 ml nebulizer suspension  500 mcg Nebulization BID RT    pantoprazole (PROTONIX) tablet 40 mg  40 mg Oral ACB       Review of Systems: Limited due to severe hard of hearing  Ears, nose, mouth, throat, and face: No epistaxis, no difficulty in swallowing  Respiratory: No hemoptysis  Cardiovascular: no chest pain or palpitations  Gastrointestinal: no abd pain, vomitting or diarrhea  Neurological: No focal weakness or seizures   Behvioral/Psych: No anxiety or depression  Constitutional: No fever or chills       Objective:   Vital Signs:    Visit Vitals  BP (!) 141/61   Pulse 85   Temp 98 °F (36.7 °C)   Resp 22   Ht 5' 9\" (1.753 m)   Wt 70.7 kg (155 lb 12.8 oz)   SpO2 95%   BMI 23.01 kg/m²       O2 Device: Nasal cannula   O2 Flow Rate (L/min): 2 l/min   Temp (24hrs), Av.7 °F (36.5 °C), Min:97.4 °F (36.3 °C), Max:98 °F (36.7 °C)       Intake/Output:   Last shift:      701 - 1900  In: 480 [P.O.:480]  Out: 250 [Urine:250]  Last 3 shifts: 1901 -  07  In: 300 [I.V.:300]  Out: 1065 [Urine:1065]    Intake/Output Summary (Last 24 hours) at 2022 1641  Last data filed at 2022 1116  Gross per 24 hour   Intake 480 ml   Output 865 ml   Net -385 ml         Physical Exam:   Patient appears comfortable; elderly and stated age; on nasal cannula oxygen-currently 2 L; acyanotic  HEENT: pupils not dilated, reactive, no scleral jaundice  Neck: No adenopathy or thyroid swelling  CVS: Normal heart sounds; S1 and S2 with no murmur; regular heart rhythm; JVD not elevated   RS: Improved breath sounds; good air entry bilaterally; no wheezing or crackles; normal respirations at rest    Abd: Soft, no tenderness, no distention  Extrm: no leg edema or swelling or clubbing  Skin: no rash  Lymphatic: no cervical or supraclavicular adenopathy  Psych: Cooperative      Data review:     Recent Results (from the past 24 hour(s))   METABOLIC PANEL, COMPREHENSIVE    Collection Time: 22  2:50 AM   Result Value Ref Range    Sodium 138 136 - 145 mmol/L    Potassium 3.6 3.5 - 5.5 mmol/L    Chloride 106 100 - 111 mmol/L    CO2 24 21 - 32 mmol/L    Anion gap 8 3.0 - 18 mmol/L    Glucose 157 (H) 74 - 99 mg/dL    BUN 41 (H) 7.0 - 18 MG/DL    Creatinine 1.50 (H) 0.6 - 1.3 MG/DL    BUN/Creatinine ratio 27 (H) 12 - 20      GFR est AA 55 (L) >60 ml/min/1.73m2    GFR est non-AA 45 (L) >60 ml/min/1.73m2    Calcium 8.4 (L) 8.5 - 10.1 MG/DL    Bilirubin, total 0.4 0.2 - 1.0 MG/DL    ALT (SGPT) 11 (L) 16 - 61 U/L    AST (SGOT) 7 (L) 10 - 38 U/L    Alk.  phosphatase 82 45 - 117 U/L    Protein, total 6.5 6.4 - 8.2 g/dL    Albumin 2.3 (L) 3.4 - 5.0 g/dL    Globulin 4.2 (H) 2.0 - 4.0 g/dL    A-G Ratio 0.5 (L) 0.8 - 1.7     CBC WITH AUTOMATED DIFF    Collection Time: 02/04/22  2:50 AM   Result Value Ref Range    WBC 9.5 4.6 - 13.2 K/uL    RBC 3.48 (L) 4.35 - 5.65 M/uL    HGB 9.5 (L) 13.0 - 16.0 g/dL    HCT 29.9 (L) 36.0 - 48.0 %    MCV 85.9 78.0 - 100.0 FL    MCH 27.3 24.0 - 34.0 PG    MCHC 31.8 31.0 - 37.0 g/dL    RDW 13.4 11.6 - 14.5 %    PLATELET 698 690 - 652 K/uL    MPV 10.0 9.2 - 11.8 FL    NRBC 0.0 0  WBC    ABSOLUTE NRBC 0.00 0.00 - 0.01 K/uL    NEUTROPHILS 93 (H) 40 - 73 %    LYMPHOCYTES 4 (L) 21 - 52 %    MONOCYTES 2 (L) 3 - 10 %    EOSINOPHILS 0 0 - 5 %    BASOPHILS 0 0 - 2 %    IMMATURE GRANULOCYTES 1 (H) 0.0 - 0.5 %    ABS. NEUTROPHILS 8.8 (H) 1.8 - 8.0 K/UL    ABS. LYMPHOCYTES 0.4 (L) 0.9 - 3.6 K/UL    ABS. MONOCYTES 0.2 0.05 - 1.2 K/UL    ABS. EOSINOPHILS 0.0 0.0 - 0.4 K/UL    ABS. BASOPHILS 0.0 0.0 - 0.1 K/UL    ABS. IMM. GRANS. 0.1 (H) 0.00 - 0.04 K/UL    DF AUTOMATED     MAGNESIUM    Collection Time: 02/04/22  2:50 AM   Result Value Ref Range    Magnesium 1.9 1.6 - 2.6 mg/dL   MAGNESIUM    Collection Time: 02/04/22  2:50 AM   Result Value Ref Range    Magnesium 1.9 1.6 - 2.5 mg/dL           No results for input(s): FIO2I, IFO2, HCO3I, IHCO3, HCOPOC, PCO2I, PCOPOC, IPHI, PHI, PHPOC, PO2I, PO2POC in the last 72 hours.     No lab exists for component: IPOC2    All Micro Results     Procedure Component Value Units Date/Time    CULTURE, BLOOD [[id number]] Collected: 02/03/22 0828    Order Status: Completed Specimen: Blood Updated: 02/04/22 0953     Special Requests: NO SPECIAL REQUESTS        Culture result: NO GROWTH 1 DAY       CULTURE, BLOOD [[id number]] Collected: 02/03/22 0605    Order Status: Completed Specimen: Blood Updated: 02/04/22 0953     Special Requests: NO SPECIAL REQUESTS        Culture result: NO GROWTH 1 DAY       SARS-COV-2 BY ANKITA [[id number]] Collected: 02/03/22 0225    Order Status: Completed Specimen: Nasopharyngeal Updated: 02/03/22 2043     SARS-CoV-2, ANKITA Not detected        Comment: This test has been authorized by the FDA under an Emergency Use Authorization (EUA) for use by authorized laboratories. Testing performed by nucleic acid amplification method. Amandeep Nash URINE [[id number]] Collected: 02/03/22 0420    Order Status: Completed Specimen: Urine, random Updated: 02/03/22 1202     Strep pneumo Ag, urine Negative       LEGIONELLA PNEUMOPHILA AG, URINE [[id number]] Collected: 02/03/22 0420    Order Status: Completed Specimen: Urine, random Updated: 02/03/22 1201     Legionella Ag, urine Negative       INFLUENZA A & B AG (RAPID TEST) [[id number]] Collected: 02/03/22 0420    Order Status: Completed Specimen: Nasopharyngeal from Nasal washing Updated: 02/03/22 0456     Influenza A Antigen Negative        Comment: A negative result does not exclude influenza virus infection, seasonal or H1N1 due to suboptimal sensitivity. If influenza is circulating in your community, a diagnosis of influenza should be considered based on a patients clinical presentation and empiric antiviral treatment should be considered, if indicated. Influenza B Antigen Negative       COVID-19 RAPID TEST [[id number]] Collected: 02/03/22 0225    Order Status: Completed Specimen: Nasopharyngeal Updated: 02/03/22 0259     Specimen source Nasopharyngeal        COVID-19 rapid test Not detected        Comment: Rapid Abbott ID Now       Rapid NAAT:  The specimen is NEGATIVE for SARS-CoV-2, the novel coronavirus associated with COVID-19. Negative results should be treated as presumptive and, if inconsistent with clinical signs and symptoms or necessary for patient management, should be tested with an alternative molecular assay. Negative results do not preclude SARS-CoV-2 infection and should not be used as the sole basis for patient management decisions.        This test has been authorized by the FDA under an Emergency Use Authorization (EUA) for use by authorized laboratories. Fact sheet for Healthcare Providers: ConventionUpdate.co.nz  Fact sheet for Patients: ConventionUpdate.co.nz       Methodology: Isothermal Nucleic Acid Amplification                 ECHO February 2021  Interpretation Summary    Result status: Final result  · Image quality for this study was suboptimal.  · Contrast used: DEFINITY. · Left Ventricle: Normal cavity size, wall thickness and systolic function (ejection fraction normal). The estimated EF is 50 - 55%. There is mild (grade 1) left ventricular diastolic dysfunction E'E= 10.05. Poor resolution of endocardial border. Can not comment on wall motion abnormality  · Tricuspid Valve: Tricuspid valve not well visualized. No stenosis. Tricuspid regurgitation is inadequate for estimation of right ventricular systolic pressure. Imaging:  [x]I have personally reviewed the patients chest radiographs images and report     Chest x-ray 2/2  Results from Hospital Encounter encounter on 02/02/22    XR CHEST PORT    Narrative  EXAM: CHEST RADIOGRAPH    CLINICAL INDICATION/HISTORY: sob  -Additional: None    COMPARISON: 9/30/2021    TECHNIQUE: Portable frontal view of the chest    _______________    FINDINGS:    SUPPORT DEVICES: None. HEART AND MEDIASTINUM: No appreciable cardiomegaly. Remaining mediastinal  contours within normal limits. LUNGS AND PLEURAL SPACES: Unusual pattern of scarring with calcification in the  right lung base is unchanged. Otherwise, the lungs are clear. No vascular  congestion. No effusions. BONY THORAX AND SOFT TISSUES: Unremarkable.    _______________    Impression  No active cardiopulmonary disease. CTA chest 2/3  Results from Hospital Encounter encounter on 02/02/22    CTA CHEST W OR W WO CONT    Narrative  EXAM: CTA chest    INDICATION: Pain. Abnormal chest x-ray. COMPARISON: April 11, 2021.     TECHNIQUE: Axial CT imaging from the thoracic inlet through the diaphragm with  intravenous contrast. Coronal and sagittal MIP reformats were generated. Dose  reduction techniques used: automated exposure control, adjustment of the mAs  and/or kVp according to patient size, and iterative reconstruction techniques. Digital imaging and communications in Medicine (DICOM) format image data are  available to nonaffiliated external healthcare facilities or entities on a  secure, media free, reciprocally searchable basis with patient authorization for  at least 12 months after this study. _______________    FINDINGS:    EXAM QUALITY: Adequate. PULMONARY ARTERIES: No evidence of pulmonary embolism. MEDIASTINUM: Normal heart size. No evidence of right heart strain. Aorta is  unremarkable. No pericardial effusion. LUNGS: There is bilateral lower lobe atelectasis and/or scarring. There is  minimal right upper lobe scarring. There is a 2.5 cm pleural-based right  middle/right upper lobe chronic nodular density. PLEURA: There is a chronic loculated small right pleural effusion with  peripheral calcification similar to previous. AIRWAY: There is partial right lower lobe peripheral bronchial opacification. LYMPH NODES: No enlarged nodes. UPPER ABDOMEN: Unremarkable. OTHER: No acute or aggressive osseous abnormalities identified. _______________    Impression  1. No evidence of pulmonary embolism. 2.  Chronic loculated right pleural effusion. 3. Bilateral lower lobe and right upper lobe scarring and/or chronic  atelectasis. 4. Partial right lower lobe peripheral bronchial opacification. Ultrasound of the legs 2/4   Interpretation Summary    · No evidence of deep vein thrombosis in the right lower extremity. · No evidence of deep vein thrombosis in the left lower extremity.            IMPRESSION:   · Acute on chronic respiratory failure with hypoxemia  · COPD exacerbation  · Asbestos pleural calcifications  · Loculated right pleural effusion  · Right subpleural calcified mass  · Chronic kidney disease stage III  · Paroxysmal atrial fibrillation  · DVT right lower extremity-October 2021  ·   Patient Active Problem List   Diagnosis Code    Hypertension I10    Chronic obstructive pulmonary disease ([facility]) J44.9    Chronic renal disease, stage 3, moderately decreased glomerular filtration rate between 30-59 mL/min/1.73 square meter ([facility]) N18.30    Age-related physical debility R54    Acute on chronic respiratory failure with hypoxemia ([facility]) J96.21    PAF (paroxysmal atrial fibrillation) ([facility]) I48.0    Avascular necrosis of bone of left hip ([facility]) M87.052    Anemia D64.9    COPD with acute exacerbation ([facility]) J44.1    Acute exacerbation of chronic obstructive pulmonary disease (COPD) ([facility]) J44.1    Loculated pleural effusion J90    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 · COPD exacerbation-improved; continue bronchodilator therapy-Brovana and budesonide nebulizer twice a day; DuoNeb as needed. · IV steroids-Solu-Medrol decreased to 40 mg every 8 hours. · Oxygen-currently 2 L; patient on chronic home O2 at 2 L at baseline. · Antibiotics-empiric ceftriaxone and azithromycin-complete 5 days. · Chronic chest CT findings as reviewed below. · Patient on Eliquis for history of paroxysmal A. fib. · History of right leg DVT in October 2021-ultrasound legs repeated-negative study. · Mild CKD-monitor kidney function. · Mild anemia-monitor hemoglobin-stable. · DVT prophylaxis reviewed. · PPI prophylaxis-pantoprazole.   ·   · Prior PFTs reviewed-moderate/stage II COPD, with mild air trapping and mildly decreased diffusion capacity. -PFT 6/29/20:  FEV1/FVC 58%, FEV1 1.4 L/54%, FVC 2.41 L/73%, %, %, DLCO 67%, DLCO %. Moderate obstruction. Air trapping. Reduced DLCO but normal DLCO VA. CTA chest reviewed on admission  No PEs; bilateral emphysematous lung disease in the upper lobes; chronic pleural calcification; right lower lobe loculated pleural effusion with calcified pleural lining; chronic right lower lobe subpleural calcified mass anteriorly; right lung mild volume loss compared to left lung. Discussed with patient and updated management plans. CODE STATUS-full code. COPD improved; I would sign off-please call if needed. Plan for follow-up in clinic with Dr. Mikhail Gonzalez; continue home inhaler on discharge. High complexity decision making was performed in this consultation and evaluation of this patient       Please note: Voice-recognition software may have been used to generate this report, which may have resulted in some phonetic-based errors in grammar and contents. Even though attempts were made to correct all the mistakes, some may have been missed, and remained in the body of the document.          Larry Trevizo MD

## 2022-02-04 NOTE — ROUTINE PROCESS
Bedside and Verbal shift change report given to [id number] Quin Shen  (oncoming nurse) by Gayatri Callaway RN  (offgoing nurse). Report given with SBAR, Kardex, Intake/Output and Recent Results.

## 2022-02-04 NOTE — PROGRESS NOTES
[id number] Shift Summary: Pt rested well overnight with no complaints. No new clinical concerns noted.      Nightshift Chart Audit Completed

## 2022-02-04 NOTE — PROGRESS NOTES
CM notes patient remains on 2L NC, patient chronic COPD patient who is very [place]. Cm to continue to monitor and remain available. Please consider ordering therapy to assist with identifying any discharge planning needs. Care Management Interventions  PCP Verified by CM:  Yes  Last Visit to PCP: 10/01/21  Transition of Care Consult (CM Consult): Discharge Planning  Support Systems: Spouse/Significant Other  Confirm Follow Up Transport: Family  Discharge Location  Patient Expects to be Discharged to[de-identified]  (discharge home with physician follow up and family assistance)

## 2022-02-04 NOTE — PROGRESS NOTES
Hospitalist Progress Note    Patient: Kelli Aase MRN: [id number]  CSN: [id number]    YOB: 1945  Age: 64 y.o. Sex: male    DOA: 2/2/2022 LOS:  LOS: 1 day          Chief Complaint:    SOB      Assessment/Plan   65 yo male with known COPD, hx covid in Sept 2021, came with SOB  He is on home 02 2 liters    Acute COPD exacerbation  duo nebs  PN as improved, continue pulmicort  Steroids weaning, 02 support  Empiric antibiotics     Paroxysmal A. fib and history of DVT -continue his eliquis     History of CHF , diastolic, compensated     hard of hearing , can hear from left ear very close     Chronic kidney disease 3    Loculated pleural effusion  Seems stable    Venous duplex is neg    Continue current care  Ambulate as tolerated  Tele monitoring    May be able to d/c tomorrow  Disposition :  Patient Active Problem List   Diagnosis Code    Hypertension I10    Chronic obstructive pulmonary disease ([facility]) J44.9    Chronic renal disease, stage 3, moderately decreased glomerular filtration rate between 30-59 mL/min/1.73 square meter ([facility]) N18.30    Age-related physical debility R54    Acute on chronic respiratory failure with hypoxemia ([facility]) J96.21    PAF (paroxysmal atrial fibrillation) ([facility]) I48.0    Avascular necrosis of bone of left hip ([facility]) M87.052    Anemia D64.9    COPD with acute exacerbation ([facility]) J44.1    Acute exacerbation of chronic obstructive pulmonary disease (COPD) ([facility]) J44.1    Loculated pleural effusion J90    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 ?    Denies any new issue  Breathing easier  No pain    Review of systems:    Constitutional: denies fevers, chills  Respiratory: denies cough  Cardiovascular: denies chest pain, palpitations  Gastrointestinal: denies nausea, vomiting, diarrhea      Vital signs/Intake and Output:  Visit Vitals  BP (!) 130/58   Pulse 73   Temp 97.6 °F (36.4 °C)   Resp 20   Ht 5' 9\" (1.753 m)   Wt 70.7 kg (155 lb 12.8 oz)   SpO2 100%   BMI 23.01 kg/m²     Current Shift:  No intake/output data recorded. Last three shifts:  02/02 1901 - 02/04 0700  In: 300 [I.V.:300]  Out: 1065 [Urine:1065]    Exam:    General: Well developed, elderly WM, sitting edge of bed, pleasant, alert, NAD, OX3  CVS:Regular rate and rhythm, no M/R/G, S1/S2 heard, no thrill  Lungs:Clear to auscultation bilaterally, no wheezes, rhonchi, or rales  Abdomen: Soft, Nontender, No distention  Extremities: No C/C/E, pulses palpable 2+  Neuro:grossly normal , follows commands  Psych:appropriate                Labs: Results:       Chemistry Recent Labs     02/04/22  0250 02/03/22 0420 02/02/22 2113   * 170* 134*    139 138   K 3.6 3.9 3.6    107 106   CO2 24 26 25   BUN 41* 27* 28*   CREA 1.50* 1.27 1.37*   CA 8.4* 8.3* 8.8   AGAP 8 6 7   BUCR 27* 21* 20   AP 82 98 112   TP 6.5 6.4 6.9   ALB 2.3* 2.6* 3.0*   GLOB 4.2* 3.8 3.9   AGRAT 0.5* 0.7* 0.8      CBC w/Diff Recent Labs     02/04/22  0250 02/03/22  0420 02/02/22 2113   WBC 9.5 12.6 14.7*   RBC 3.48* 3.68* 4.09*   HGB 9.5* 10.0* 10.9*   HCT 29.9* 31.7* 34.8*    355 397   GRANS 93* 97* 70   LYMPH 4* 1* 7*   EOS 0 0 14*      Cardiac Enzymes No results for input(s): CPK, CKND1, WILLARD in the last 72 hours. No lab exists for component: CKRMB, TROIP   Coagulation No results for input(s): PTP, INR, APTT, INREXT in the last 72 hours. Lipid Panel No results found for: CHOL, CHOLPOCT, CHOLX, CHLST, CHOLV, 987276, HDL, HDLP, LDL, LDLC, DLDLP, 106905, VLDLC, VLDL, TGLX, TRIGL, TRIGP, TGLPOCT, CHHD, CHHDX   BNP No results for input(s): BNPP in the last 72 hours.    Liver Enzymes Recent Labs     02/04/22  0250   TP 6.5   ALB 2.3*   AP 82      Thyroid Studies Lab Results   Component Value Date/Time    TSH 3.06 04/05/2021 01:30 PM        Procedures/imaging: see electronic medical records for all procedures/Xrays and details which were not copied into this note but were reviewed prior to creation of Rajesh Washburn MD

## 2022-02-05 VITALS
HEART RATE: 73 BPM | DIASTOLIC BLOOD PRESSURE: 78 MMHG | TEMPERATURE: 97.3 F | OXYGEN SATURATION: 99 % | WEIGHT: 158.51 LBS | SYSTOLIC BLOOD PRESSURE: 157 MMHG | BODY MASS INDEX: 23.48 KG/M2 | HEIGHT: 69 IN | RESPIRATION RATE: 18 BRPM

## 2022-02-05 LAB
ALBUMIN SERPL-MCNC: 2.3 G/DL (ref 3.4–5)
ALBUMIN/GLOB SERPL: 0.6 {RATIO} (ref 0.8–1.7)
ALP SERPL-CCNC: 75 U/L (ref 45–117)
ALT SERPL-CCNC: 14 U/L (ref 16–61)
ANION GAP SERPL CALC-SCNC: 6 MMOL/L (ref 3–18)
AST SERPL-CCNC: 11 U/L (ref 10–38)
BASOPHILS # BLD: 0 K/UL (ref 0–0.1)
BASOPHILS NFR BLD: 0 % (ref 0–2)
BILIRUB SERPL-MCNC: 0.3 MG/DL (ref 0.2–1)
BUN SERPL-MCNC: 52 MG/DL (ref 7–18)
BUN/CREAT SERPL: 31 (ref 12–20)
CALCIUM SERPL-MCNC: 8 MG/DL (ref 8.5–10.1)
CHLORIDE SERPL-SCNC: 106 MMOL/L (ref 100–111)
CO2 SERPL-SCNC: 25 MMOL/L (ref 21–32)
CREAT SERPL-MCNC: 1.7 MG/DL (ref 0.6–1.3)
DIFFERENTIAL METHOD BLD: ABNORMAL
EOSINOPHIL # BLD: 0 K/UL (ref 0–0.4)
EOSINOPHIL NFR BLD: 0 % (ref 0–5)
ERYTHROCYTE [DISTWIDTH] IN BLOOD BY AUTOMATED COUNT: 13.5 % (ref 11.6–14.5)
GLOBULIN SER CALC-MCNC: 4.1 G/DL (ref 2–4)
GLUCOSE SERPL-MCNC: 145 MG/DL (ref 74–99)
HCT VFR BLD AUTO: 30.9 % (ref 36–48)
HGB BLD-MCNC: 9.8 G/DL (ref 13–16)
IMM GRANULOCYTES # BLD AUTO: 0.1 K/UL (ref 0–0.04)
IMM GRANULOCYTES NFR BLD AUTO: 1 % (ref 0–0.5)
LYMPHOCYTES # BLD: 0.4 K/UL (ref 0.9–3.6)
LYMPHOCYTES NFR BLD: 5 % (ref 21–52)
MAGNESIUM SERPL-MCNC: 2 MG/DL (ref 1.6–2.6)
MCH RBC QN AUTO: 27.3 PG (ref 24–34)
MCHC RBC AUTO-ENTMCNC: 31.7 G/DL (ref 31–37)
MCV RBC AUTO: 86.1 FL (ref 78–100)
MONOCYTES # BLD: 0.2 K/UL (ref 0.05–1.2)
MONOCYTES NFR BLD: 3 % (ref 3–10)
NEUTS SEG # BLD: 7.4 K/UL (ref 1.8–8)
NEUTS SEG NFR BLD: 92 % (ref 40–73)
NRBC # BLD: 0 K/UL (ref 0–0.01)
NRBC BLD-RTO: 0 PER 100 WBC
PLATELET # BLD AUTO: 367 K/UL (ref 135–420)
PMV BLD AUTO: 9.6 FL (ref 9.2–11.8)
POTASSIUM SERPL-SCNC: 3.6 MMOL/L (ref 3.5–5.5)
PROT SERPL-MCNC: 6.4 G/DL (ref 6.4–8.2)
RBC # BLD AUTO: 3.59 M/UL (ref 4.35–5.65)
SODIUM SERPL-SCNC: 137 MMOL/L (ref 136–145)
WBC # BLD AUTO: 8 K/UL (ref 4.6–13.2)

## 2022-02-05 PROCEDURE — 94640 AIRWAY INHALATION TREATMENT: CPT

## 2022-02-05 PROCEDURE — 74011636637 HC RX REV CODE- 636/637: Performed by: INTERNAL MEDICINE

## 2022-02-05 PROCEDURE — 74011250637 HC RX REV CODE- 250/637: Performed by: INTERNAL MEDICINE

## 2022-02-05 PROCEDURE — 74011000250 HC RX REV CODE- 250: Performed by: INTERNAL MEDICINE

## 2022-02-05 PROCEDURE — 36415 COLL VENOUS BLD VENIPUNCTURE: CPT

## 2022-02-05 PROCEDURE — 83735 ASSAY OF MAGNESIUM: CPT

## 2022-02-05 PROCEDURE — 97116 GAIT TRAINING THERAPY: CPT

## 2022-02-05 PROCEDURE — 80053 COMPREHEN METABOLIC PANEL: CPT

## 2022-02-05 PROCEDURE — 74011000258 HC RX REV CODE- 258: Performed by: INTERNAL MEDICINE

## 2022-02-05 PROCEDURE — 94760 N-INVAS EAR/PLS OXIMETRY 1: CPT

## 2022-02-05 PROCEDURE — 97161 PT EVAL LOW COMPLEX 20 MIN: CPT

## 2022-02-05 PROCEDURE — 74011250636 HC RX REV CODE- 250/636: Performed by: HOSPITALIST

## 2022-02-05 PROCEDURE — 85025 COMPLETE CBC W/AUTO DIFF WBC: CPT

## 2022-02-05 PROCEDURE — 74011250636 HC RX REV CODE- 250/636: Performed by: INTERNAL MEDICINE

## 2022-02-05 RX ORDER — PREDNISONE 20 MG/1
40 TABLET ORAL
Status: DISCONTINUED | OUTPATIENT
Start: 2022-02-05 | End: 2022-02-05 | Stop reason: HOSPADM

## 2022-02-05 RX ORDER — PREDNISONE 20 MG/1
20 TABLET ORAL
Qty: 10 TABLET | Refills: 0 | Status: SHIPPED | OUTPATIENT
Start: 2022-02-06 | End: 2022-03-22

## 2022-02-05 RX ORDER — AMOXICILLIN AND CLAVULANATE POTASSIUM 875; 125 MG/1; MG/1
1 TABLET, FILM COATED ORAL 2 TIMES DAILY
Qty: 16 TABLET | Refills: 0 | Status: SHIPPED | OUTPATIENT
Start: 2022-02-05 | End: 2022-03-19

## 2022-02-05 RX ADMIN — PANTOPRAZOLE SODIUM 40 MG: 40 TABLET, DELAYED RELEASE ORAL at 06:51

## 2022-02-05 RX ADMIN — GABAPENTIN 100 MG: 100 CAPSULE ORAL at 09:14

## 2022-02-05 RX ADMIN — METHYLPREDNISOLONE SODIUM SUCCINATE 40 MG: 40 INJECTION, POWDER, FOR SOLUTION INTRAMUSCULAR; INTRAVENOUS at 06:51

## 2022-02-05 RX ADMIN — APIXABAN 5 MG: 5 TABLET, FILM COATED ORAL at 09:14

## 2022-02-05 RX ADMIN — SODIUM CHLORIDE, PRESERVATIVE FREE 10 ML: 5 INJECTION INTRAVENOUS at 06:52

## 2022-02-05 RX ADMIN — BUDESONIDE 500 MCG: 0.5 INHALANT RESPIRATORY (INHALATION) at 07:40

## 2022-02-05 RX ADMIN — PREDNISONE 40 MG: 20 TABLET ORAL at 12:56

## 2022-02-05 RX ADMIN — AZITHROMYCIN MONOHYDRATE 500 MG: 500 INJECTION, POWDER, LYOPHILIZED, FOR SOLUTION INTRAVENOUS at 04:24

## 2022-02-05 RX ADMIN — AMLODIPINE BESYLATE 5 MG: 5 TABLET ORAL at 09:14

## 2022-02-05 RX ADMIN — ARFORMOTEROL TARTRATE 15 MCG: 15 SOLUTION RESPIRATORY (INHALATION) at 07:40

## 2022-02-05 RX ADMIN — SODIUM CHLORIDE 1 G: 900 INJECTION INTRAVENOUS at 04:24

## 2022-02-05 NOTE — PROGRESS NOTES
2/5/2022 PT note: consult received and chart reviewed. Evaluation completed. Pt demonstrates transfers STS with RW/mod I. Demonstrates gait with RW/S/mod I with O2 at 2 Lnc in salmon. O2 sat 98% pre and 99% post gt activity. Pt returned to room, left seated in bedside chair with all needs in reach and nurse Marita notified of above. No HHPT indicated at this time as pt reports and appears back to baseline. Full report to follow.   Thank you for this referral.   Willam Flores, PT

## 2022-02-05 NOTE — DISCHARGE SUMMARY
Discharge Summary    Patient: Cassidy Charles               Sex: male          DOA: 2/2/2022         YOB: 1945      Age:  64 y.o.        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 ICD-10-CM: J92.0  ICD-9-CM: 511.0, V15.84  2/3/2022 - Present        Right lower lobe lung mass ICD-10-CM: R91.8  ICD-9-CM: 786.6  2/3/2022 - Present        Bilateral deafness ICD-10-CM: H91.93  ICD-9-CM: 389.9  2/3/2022 - Present        Acute deep vein thrombosis (DVT) of femoral vein of right lower extremity (HCC) ICD-10-CM: I82.411  ICD-9-CM: 453.41  2/3/2022 - Present        Acute exacerbation of chronic obstructive pulmonary disease (COPD) ([facility] ICD-10-CM: J44.1  ICD-9-CM: 491.21  10/5/2021 - Present        * (Principal) COPD with acute exacerbation ([facility] ICD-10-CM: J44.1  ICD-9-CM: 491.21  9/30/2021 - Present        Anemia ICD-10-CM: D64.9  ICD-9-CM: 285.9  4/11/2021 - Present        Avascular necrosis of bone of left hip ([facility] ICD-10-CM: M87.052  ICD-9-CM: 733.42  4/8/2021 - Present        PAF (paroxysmal atrial fibrillation) ([facility] ICD-10-CM: I48.0  ICD-9-CM: 427.31  4/5/2021 - Present        Acute on chronic respiratory failure with hypoxemia ([facility] ICD-10-CM: J96.21  ICD-9-CM: 518.84  8/19/2019 - Present        Age-related physical debility ICD-10-CM: R54  ICD-9-CM: 111  7/8/2019 - Present        Chronic renal disease, stage 3, moderately decreased glomerular filtration rate between 30-59 mL/min/1.73 square meter ([facility] ICD-10-CM: N18.30  ICD-9-CM: 585.3  7/20/2018 - Present        Chronic obstructive pulmonary disease ([facility] ICD-10-CM: J44.9  ICD-9-CM: 118  4/20/2018 - Present        Hypertension ICD-10-CM: I10  ICD-9-CM: 401.9  Unknown - Present        RESOLVED: Acute respiratory disease ICD-10-CM: J06.9  ICD-9-CM: 465.9  2/3/2022 - 2/3/2022        RESOLVED: DVT (deep venous thrombosis) ([facility] ICD-10-CM: I82.409  ICD-9-CM: 453.40  10/1/2021 - 2/3/2022        RESOLVED: Febrile illness ICD-10-CM: R50.9  ICD-9-CM: 780.60  9/30/2021 - 2/3/2022        RESOLVED: Cellulitis of left ankle ICD-10-CM: L03.116  ICD-9-CM: 682.6  8/23/2021 - 2/3/2022        RESOLVED: 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 8XXA, L08.9  ICD-9-CM: 958.3  8/20/2021 - 2/3/2022        RESOLVED: Supplemental oxygen dependent ICD-10-CM: Z99.81  ICD-9-CM: V46.2  8/20/2021 - 2/3/2022        RESOLVED: MRSA (methicillin resistant staph aureus) culture positive ICD-10-CM: Z22.322  ICD-9-CM: V02.54  8/20/2021 - 2/3/2022        RESOLVED: Pyogenic inflammation of bone ([facility] ICD-10-CM: M86.9  ICD-9-CM: 730.20  8/20/2021 - 2/3/2022        RESOLVED: Osteomyelitis of left ankle ([facility] ICD-10-CM: M86.9  ICD-9-CM: 730.27  8/20/2021 - 2/3/2022        RESOLVED: Intercondylar fracture of femur ([address] ICD-10-CM: T90.051F  ICD-9-CM: 821.23  7/12/2021 - 2/3/2022        RESOLVED: Left leg pain ICD-10-CM: M79.605  ICD-9-CM: 729.5  7/12/2021 - 2/3/2022        RESOLVED: Open wound of left hip ICD-10-CM: V44.632G  ICD-9-CM: 890.0  5/26/2021 - 2/3/2022        RESOLVED: Scrotal edema ICD-10-CM: N50.89  ICD-9-CM: 608.86  4/23/2021 - 2/3/2022        RESOLVED: Hematoma of left hip ICD-10-CM: G57.77HR  ICD-9-CM: 924.01  4/19/2021 - 2/3/2022        RESOLVED: Cellulitis ICD-10-CM: L03.90  ICD-9-CM: 682.9  4/18/2021 - 4/20/2021        RESOLVED: Acute hip pain ICD-10-CM: M25.559  ICD-9-CM: 719.45  4/18/2021 - 2/3/2022        RESOLVED: Status post total replacement of left hip ICD-10-CM: I77.887  ICD-9-CM: V43.64  4/14/2021 - 2/3/2022        RESOLVED: Obesity ICD-10-CM: E66.9  ICD-9-CM: 278.00  4/10/2021 - 2/3/2022        RESOLVED: Acute pulmonary edema ([facility] ICD-10-CM: J81.0  ICD-9-CM: 518.4  4/10/2021 - 2/3/2022        RESOLVED: Avascular necrosis ([facility] ICD-10-CM: M87.00  ICD-9-CM: 733.40  4/8/2021 - 4/8/2021        RESOLVED: Left hip pain ICD-10-CM: M25.552  ICD-9-CM: 719.45  4/5/2021 - 2/3/2022        RESOLVED: Acute respiratory failure ([facility] ICD-10-CM: J96.00  ICD-9-CM: 518.81  2/26/2021 - 4/10/2021        RESOLVED: Respiratory distress ICD-10-CM: R06.03  ICD-9-CM: 786.09  2/2/2021 - 4/10/2021        RESOLVED: COPD exacerbation ([facility] ICD-10-CM: J44.1  ICD-9-CM: 491.21  2/2/2021 - 4/10/2021        RESOLVED: Atrial fibrillation with RVR ([facility] ICD-10-CM: I48.91  ICD-9-CM: 427.31  2/2/2021 - 4/10/2021        RESOLVED: COPD (chronic obstructive pulmonary disease) ([facility] ICD-10-CM: J44.9  ICD-9-CM: 496  8/5/2020 - 4/10/2021        RESOLVED: CAP (community acquired pneumonia) ICD-10-CM: J18.9  ICD-9-CM: 486  8/5/2020 - 4/10/2021        RESOLVED: Advanced care planning/counseling discussion ICD-10-CM: Q62.29  ICD-9-CM: V65.49  Unknown - 4/10/2021        RESOLVED: Hypokalemia ICD-10-CM: E87.6  ICD-9-CM: 276.8  4/14/2020 - 4/10/2021        RESOLVED: Hyponatremia ICD-10-CM: E87.1  ICD-9-CM: 276.1  4/10/2020 - 4/10/2021        RESOLVED: COPD with acute exacerbation ([facility] ICD-10-CM: J44.1  ICD-9-CM: 491.21  4/9/2020 - 4/10/2021        RESOLVED: Acute respiratory failure with hypoxia and hypercarbia (HCC) ICD-10-CM: J96.01, J96.02  ICD-9-CM: 518.81  4/9/2020 - 4/10/2021        RESOLVED: Pleural effusion, right ICD-10-CM: J90  ICD-9-CM: 511.9  2/22/2020 - 4/10/2021        RESOLVED: Tobacco dependence ICD-10-CM: O54.437  ICD-9-CM: 305.1  2/21/2020 - 2/3/2022        RESOLVED: Hyponatremia ICD-10-CM: E87.1  ICD-9-CM: 276.1  2/21/2020 - 4/10/2021        RESOLVED: Acute on chronic respiratory failure with hypoxia and hypercapnia (HCC) ICD-10-CM: J96.21, J96.22  ICD-9-CM: 518.84, 786.09, 799.02  10/30/2019 - 2/25/2020        RESOLVED: Paroxysmal atrial fibrillation (HCC) ICD-10-CM: I48.0  ICD-9-CM: 427.31  8/19/2019 - 4/10/2021        RESOLVED: Asbestosis ([facility] ICD-10-CM: Z33  ICD-9-CM: 443  10/19/2018 - 4/10/2021        RESOLVED: COPD with asthma and status asthmaticus ([facility] ICD-10-CM: J44.9, J45.902  ICD-9-CM: 493.21  7/20/2018 - 2/8/2019        RESOLVED: Status asthmaticus with COPD (chronic obstructive pulmonary disease) ([facility] ICD-10-CM: J44.9, J45.902  ICD-9-CM: 493.21  4/20/2018 - 2/8/2019        RESOLVED: PVC's (premature ventricular contractions) ICD-10-CM: I49.3  ICD-9-CM: 427.69  4/20/2018 - 2/8/2019        RESOLVED: Acute respiratory failure ([facility] ICD-10-CM: J96.00  ICD-9-CM: 518.81  10/2/2014 - 3/18/2017        RESOLVED: URIEL (acute kidney injury) ([facility] ICD-10-CM: N17.9  ICD-9-CM: 584.9  10/2/2014 - 2/8/2019        RESOLVED: Pneumonia ICD-10-CM: J18.9  ICD-9-CM: 486  10/2/2014 - 3/18/2017              HPI:  Tan Graff is a 64 y.o.  male who has history of COPD on home oxygen, hard of hearing, congestive heart failure, paroxysmal atrial fib, DVT, prostate cancer presents to the emergency room with shortness of breath that has been progressive over the last few days. Patient has history of COVID-19 back in September and has remained on home oxygen. In the emergency room he was given duo nebs steroids with no improvement of his wheezing and respiratory rate. You required no increase of oxygen however it was thought that he should be admitted to due to increased respiratory rate and failure to improve after DuoNeb and steroids.   Patient has had 2 out of 3 vaccines but suspect Covid no new Covid exposures  History is limited as he is extremely     Discharge Condition: stable    Hospital Course: Admitted and placed on steroids and nebs and slowly improved. He wanted to go home and was doing well so we allowed him to go. He needs to see his PCP to get BMp done  He was told symptoms when to come back. .  Consults:  Pulmonary    Significant Diagnostic Studies:   DVT was negative  CTA was negative for PE    Discharge Medications:     Current Discharge Medication List        CONTINUE these medications which have NOT CHANGED    Details   chlorthalidone (HYGROTON) 25 mg tablet Take 25 mg by mouth daily. traMADoL (ULTRAM) 50 mg tablet Take 50 mg by mouth every eight (8) hours as needed for Pain.      gabapentin (NEURONTIN) 100 mg capsule Take 100 mg by mouth three (3) times daily. apixaban (ELIQUIS) 5 mg tablet Take 1 Tablet by mouth two (2) times a day. Qty: 60 Tablet, Refills: 0      collagenase (SANTYL) 250 unit/gram ointment Apply  to affected area daily. Qty: 30 g, Refills: 1      arformoteroL (BROVANA) 15 mcg/2 mL nebu neb solution 2 mL by Nebulization route two (2) times a day. Qty: 30 Vial, Refills: 0      budesonide (PULMICORT) 0.5 mg/2 mL nbsp 2 mL by Nebulization route two (2) times a day. Qty: 30 Each, Refills: 0      famotidine (PEPCID) 20 mg tablet Take 1 Tab by mouth daily. Qty: 30 Tab, Refills: 0      ferrous sulfate 325 mg (65 mg iron) tablet Take 1 Tab by mouth two (2) times daily (with meals). Qty: 30 Tab, Refills: 0      amLODIPine (NORVASC) 5 mg tablet Take 1 Tab by mouth daily. Qty: 30 Tab, Refills: 0      albuterol-ipratropium (DUO-NEB) 2.5 mg-0.5 mg/3 ml nebu 3 mL by Nebulization route every four (4) hours as needed for Wheezing. Qty: 30 Nebule, Refills: 0      albuterol (PROVENTIL HFA, VENTOLIN HFA, PROAIR HFA) 90 mcg/actuation inhaler Take 2 Puffs by inhalation every four (4) hours as needed for Wheezing or Shortness of Breath. Qty: 1 Inhaler, Refills: 1      OXYGEN-AIR DELIVERY SYSTEMS 2 L/min by Nasal route continuous.       furosemide (Lasix) 20 mg tablet Take 20 mg by mouth daily. dilTIAZem (CARDIZEM) 30 mg tablet Take 1 Tab by mouth Before breakfast, lunch, and dinner. Qty: 90 Tab, Refills: 0      dextran 70/hypromellose (ARTIFICIAL TEARS, PF, OP) Administer 2 Drops to both eyes daily as needed for Other (dry eyes). tamsulosin (FLOMAX) 0.4 mg capsule Take 0.4 mg by mouth every evening. Activity: ad duke  Diet: ad duke      Follow-up:  Will need to see PCP this week for follow up BMP    Disposition:  [address] Lisandro Lopez MD2/5/202211:17 AM

## 2022-02-05 NOTE — PROGRESS NOTES
Hospitalist Progress Note    Patient: Felicitas Grewal MRN: [id number]  CSN: [id number]    YOB: 1945  Age: 64 y.o. 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 Review of systems:    General: No fevers or chills. Cardiovascular: No chest pain or pressure. No palpitations. Pulmonary: No shortness of breath. Gastrointestinal: No nausea, vomiting. Objective:    Vital signs/Intake and Output:  Visit Vitals  BP (!) 144/73   Pulse 81   Temp 98.6 °F (37 °C)   Resp 20   Ht 5' 9\" (1.753 m)   Wt 71.9 kg (158 lb 8.2 oz)   SpO2 95%   BMI 23.41 kg/m²     Current Shift:  02/05 0701 - 02/05 1900  In: -   Out: 300 [Urine:300]  Last three shifts:  02/03 1901 - 02/05 0700  In: 480 [P.O.:480]  Out: 790 [Urine:790]    Physical Exam:  General: NAD, AAOx3. Non-toxic. HEENT: NC/AT. PERRLA, EOMI.  MMM. Lungs: Nml inspection. CTA B/L. No wheezing, rales or rhonchi. Heart:  S1S2 RRR,  PMI mid 5th IC space. No M/RG.   Abdomen: Soft, NT/ND.  BS+. No peritoneal signs. Extremities: No C/C/E. Psych:   Nml affect. Neurologic:  2-12 intact. Strength 5/5 throughout. Sensation symmetrical.          Labs: Results:       Chemistry Recent Labs     02/05/22 0226 02/04/22 0250 02/03/22 0420   * 157* 170*    138 139   K 3.6 3.6 3.9    106 107   CO2 25 24 26   BUN 52* 41* 27*   CREA 1.70* 1.50* 1.27   CA 8.0* 8.4* 8.3*   AGAP 6 8 6   BUCR 31* 27* 21*   AP 75 82 98   TP 6.4 6.5 6.4   ALB 2.3* 2.3* 2.6*   GLOB 4.1* 4.2* 3.8   AGRAT 0.6* 0.5* 0.7*      CBC w/Diff Recent Labs     02/05/22 0226 02/04/22 0250 02/03/22 0420   WBC 8.0 9.5 12.6   RBC 3.59* 3.48* 3.68*   HGB 9.8* 9.5* 10.0*   HCT 30.9* 29.9* 31.7*    336 355   GRANS 92* 93* 97*   LYMPH 5* 4* 1*   EOS 0 0 0      Cardiac Enzymes No results for input(s): CPK, CKND1, WILLARD in the last 72 hours. No lab exists for component: CKRMB, TROIP   Coagulation No results for input(s): PTP, INR, APTT, INREXT in the last 72 hours. Lipid Panel No results found for: CHOL, CHOLPOCT, CHOLX, CHLST, CHOLV, 025064, HDL, HDLP, LDL, LDLC, DLDLP, 569254, VLDLC, VLDL, TGLX, TRIGL, TRIGP, TGLPOCT, CHHD, CHHDX   BNP No results for input(s): BNPP in the last 72 hours.    Liver Enzymes Recent Labs     02/05/22 0226   TP 6.4   ALB 2.3*   AP 75      Thyroid Studies Lab Results   Component Value Date/Time    TSH 3.06 04/05/2021 01:30 PM        Procedures/imaging: see electronic medical records for all procedures/Xrays and details which were not copied into this note but were reviewed prior to creation of Plan

## 2022-02-05 NOTE — DISCHARGE INSTRUCTIONS
Patient Education        COPD Exacerbation Plan: Care Instructions  Your Care Instructions     If you have chronic obstructive pulmonary disease (COPD), your usual shortness of breath could suddenly get worse. You may start coughing more and have more mucus. This flare-up is called a COPD exacerbation (say \"[id number]-BAY-gage\"). A lung infection or air pollution could set off an exacerbation. Sometimes it can happen after a quick change in temperature or being around chemicals. Work with your doctor to make a plan for dealing with an exacerbation. You can better manage it if you plan ahead. Follow-up care is a key part of your treatment and safety. Be sure to make and go to all appointments, and call your doctor if you are having problems. It's also a good idea to know your test results and keep a list of the medicines you take. How can you care for yourself at home? During an exacerbation  · Do not panic if you start to have one. Quick treatment at home may help you prevent serious breathing problems. If you have a COPD exacerbation plan that you developed with your doctor, follow it. · Take your medicines exactly as your doctor tells you.  ? Use your inhaler as directed by your doctor. If your symptoms do not get better after you use your medicine, have someone take you to the emergency room. Call an ambulance if necessary. ? With inhaled medicines, a spacer or a nebulizer may help you get more medicine to your lungs. Ask your doctor or pharmacist how to use them properly. Practice using the spacer in front of a mirror before you have an exacerbation. This may help you get the medicine into your lungs quickly. ? If your doctor has given you steroid pills, take them as directed. ? Your doctor may have given you a prescription for antibiotics, which you can fill if you need it. ? Talk to your doctor if you have any problems with your medicine.  And call your doctor if you have to use your antibiotic or steroid pills. Preventing an exacerbation  · Do not smoke. This is the most important step you can take to prevent more damage to your lungs and prevent problems. If you already smoke, it is never too late to stop. If you need help quitting, talk to your doctor about stop-smoking programs and medicines. These can increase your chances of quitting for good. · Take your daily medicines as prescribed. · Avoid colds and flu. ? Get a pneumococcal vaccine. ? Get a flu vaccine each year, as soon as it is available. Ask those you live or work with to do the same, so they will not get the flu and infect you. ? Try to stay away from people with colds or the flu. ? Wash your hands often. · Avoid secondhand smoke; air pollution; cold, dry air; hot, humid air; and high altitudes. Stay at home with your windows closed when air pollution is bad. · Learn breathing techniques for COPD, such as breathing through pursed lips. These techniques can help you breathe easier during an exacerbation. When should you call for help? Call 911 anytime you think you may need emergency care. For example, call if:    · You have severe trouble breathing.     · You have severe chest pain. Call your doctor now or seek immediate medical care if:    · You have new or worse shortness of breath.     · You develop new chest pain.     · You are coughing more deeply or more often, especially if you notice more mucus or a change in the color of your mucus.     · You cough up blood.     · You have new or increased swelling in your legs or belly.     · You have a fever. Watch closely for changes in your health, and be sure to contact your doctor if:    · You need to use your antibiotic or steroid pills.     · Your symptoms are getting worse. Where can you learn more? Go to http://www.gray.com/  Enter U536 in the search box to learn more about \"COPD Exacerbation Plan: Care Instructions. \"  Current as of: July 6, 2021               Content Version: 13.0  © 2551-4807 [employer]. Care instructions adapted under license by [employer] (which disclaims liability or warranty for this information). If you have questions about a medical condition or this instruction, always ask your healthcare professional. [address] any warranty or liability for your use of this information.

## 2022-02-05 NOTE — PROGRESS NOTES
Problem: Mobility Impaired (Adult and Pediatric)  Goal: *Acute Goals and Plan of Care (Insert Text)  Description: No goals necessary at this time as pt demonstrates functional mobility with mod I/S with port O2 at 2 Lnc (baseline). Outcome: Resolved/Met  PHYSICAL THERAPY EVALUATION & DISCHARGE    Patient: Gray Cates (16 y.o. male)  Date: 2/5/2022  Primary Diagnosis: Acute respiratory disease [J06.9]  COPD with acute exacerbation ([facility] [address] [J44.1]  Precautions:   Fall  PLOF: amb with RW    ASSESSMENT AND RECOMMENDATIONS:  Based on the objective data described below, the patient presents with ability to perform functional mobility tasks including bed mobility, transfers and gait level surface at mod I/supervision level. Pt found seated EOB and shoes donned with PT assist.  Pt ambulating ~80ft with decr'd josé and foot clearance with RW/S with O2 at 2 Lnc in place. O2 sat 98% pre and 99% post gt activity. Pt returned to room and left up in chair at bedside. Pt functioning at baseline and eager to go home today. Notified Dr. Aline Hancock and nurse of above and that no HHPT indicated at this time. Pt with all needs in reach. No further PT planned. Skilled physical therapy is not indicated at this time. Pt Education: Role of physical therapy in acute care setting, fall prevention and safety/technique during functional mobility tasks   Discharge Recommendations: None  Further Equipment Recommendations for Discharge: N/A      SUBJECTIVE:   Patient stated I'm ready to go home.     OBJECTIVE DATA SUMMARY:     Past Medical History:   Diagnosis Date    Brain aneurysm     Brain aneurysm     Cancer ([facility] [address]     prostate    CHF (congestive heart failure) (HCC)     CKD (chronic kidney disease)     Closed nondisplaced supracondylar fracture of lower end of left femur without intracondylar extension with delayed healing     COPD (chronic obstructive pulmonary disease) ([address]     Debility     History of home oxygen therapy     [place] (hard of hearing)     Hypertension     Nocturia     On home oxygen therapy     PAF (paroxysmal atrial fibrillation) (HCC)     Pneumonia     Prostate CA ([facility] [address]     Prostate neoplasm     Radiation effect      Past Surgical History:   Procedure Laterality Date    HX HERNIA REPAIR      HX HIP ARTHROSCOPY  04/09/2021     Barriers to Learning/Limitations: yes;  sensory deficits-vision/hearing/speech  Compensate with: visual, verbal, tactile, kinesthetic cues/model  Prior Level of Function/Home Situation: speak into R ear  Home Situation  Home Environment: Trailer/mobile home  # Steps to Enter: 6  Rails to Enter: Yes  Hand Rails : Bilateral  Wheelchair Ramp: No  One/Two Story Residence: One story  Living Alone: No  Support Systems: Spouse/Significant Other  Patient Expects to be Discharged to[de-identified] Home  Current DME Used/Available at Home: Walker, rolling  Tub or Shower Type: Shower  Critical Behavior:  Neurologic State: Alert; Appropriate for age  Orientation Level: Oriented X4  Cognition: Follows commands  Safety/Judgement: Awareness of environment; Fall prevention  Psychosocial  Patient Behaviors: Calm; Cooperative  Purposeful Interaction: Yes  Pt Identified Daily Priority: Clinical issues (comment)  Caritas Process: Nurture loving kindness; Teaching/learning; Attend basic human needs  Caring Interventions: Reassure; Therapeutic modalities  Reassure: Therapeutic listening; Informing  Therapeutic Modalities: Humor; Intentional therapeutic touch  Strength:    Strength: Within functional limits (grossly 4-/5 LE's)  Tone & Sensation:   Sensation: Intact  Range Of Motion:  AROM: Generally decreased, functional  Functional Mobility:  Transfers:  Sit to Stand: Modified independent  Stand to Sit: Modified independent  Bed to Chair: Supervision  Balance:   Sitting: Intact  Standing: Intact; With support  Ambulation/Gait Training:  Distance (ft): 80 Feet (ft)  Assistive Device: Gait belt;Walker, rolling (port O2 at 2 Lnc)  Ambulation - Level of Assistance: Supervision;Modified independent  Gait Description (WDL): Exceptions to WDL  Gait Abnormalities: Decreased step clearance  Speed/[place]: Pace decreased (<100 feet/min)  Interventions: Verbal cues (pacing self)  Pain:  Pain Scale 1: Numeric (0 - 10)  Pain Intensity 1: 0  Activity Tolerance:   Fair (h/o COPD with O2 use)  Please refer to the flowsheet for vital signs taken during this treatment. After treatment:   [x]         Patient left in no apparent distress sitting up in chair  []         Patient left in no apparent distress in bed  [x]         Call bell left within reach  [x]         Nursing notified-Marita  []         Caregiver present  []         Bed alarm activated  []         SCDs applied    COMMUNICATION/EDUCATION:   [x]         Role of Physical Therapy in the acute care setting. [x]         Fall prevention education was provided and the patient/caregiver indicated understanding. [x]         Patient/family have participated as able in goal setting and plan of care. [x]         Patient/family agree to work toward stated goals and plan of care. []         Patient understands intent and goals of therapy, but is neutral about his/her participation.   []         Patient is unable to participate in goal setting/plan of care: ongoing with therapy staff.  []         Other:    Eval Complexity: History: MEDIUM  Complexity : 1-2 comorbidities / personal factors will impact the outcome/ POC Exam:MEDIUM Complexity : 3 Standardized tests and measures addressing body structure, function, activity limitation and / or participation in recreation  Presentation: LOW Complexity : Stable, uncomplicated  Clinical Decision Making:Medium Complexity    Overall Complexity:MEDIUM    Thank you for this referral.  Wesley Pepper, PT   Time Calculation: 24 mins

## 2022-02-05 NOTE — PROGRESS NOTES
Problem: Falls - Risk of  Goal: *Absence of Falls  Description: Document [place] Fall Risk and appropriate interventions in the flowsheet.   Outcome: Progressing Towards Goal  Note: Fall Risk Interventions:  Mobility Interventions: Assess mobility with egress test,Bed/chair exit alarm,Patient to call before getting OOB    Mentation Interventions: Door open when patient unattended    Medication Interventions: Bed/chair exit alarm,Patient to call before getting OOB    Elimination Interventions: Call light in reach              Problem: Patient Education: Go to Patient Education Activity  Goal: Patient/Family Education  Outcome: Progressing Towards Goal     Problem: Chronic Obstructive Pulmonary Disease (COPD)  Goal: *Oxygen saturation during activity within specified parameters  Outcome: Progressing Towards Goal  Goal: *Able to remain out of bed as prescribed  Outcome: Progressing Towards Goal  Goal: *Absence of hypoxia  Outcome: Progressing Towards Goal  Goal: *Optimize nutritional status  Outcome: Progressing Towards Goal     Problem: Patient Education: Go to Patient Education Activity  Goal: Patient/Family Education  Outcome: Progressing Towards Goal

## 2022-02-06 NOTE — PROGRESS NOTES
Physician Progress Note      Rupali Alvarez  CSN #:                  [id number]  :                       1945  ADMIT DATE:       2022 9:01 PM  [address] [place] DATE:        2022 2:58 PM  RESPONDING  PROVIDER #:        Horacio Cote MD          QUERY TEXT:    Pt admitted with COPD exacerbation. Pt noted to have shortness of breath that has been progressive over the last few days. Duo nebs steroids were given in the ED with no improvement of his wheezing and respiratory rate. If possible, please document in the progress notes and discharge summary if you are evaluating and/or treating any of the following: The medical record reflects the following:    Risk Factors: CHF, Hx of COVID-19, COPD    Clinical Indicators:  > Per H&P- duo nebs steroids with no improvement of his wheezing and respiratory rate  Admitted to due to increased respiratory rate and failure to improve after DuoNeb and steroids. > RR 23, 24, 25  > BP  160/67, 123/58, 129/58, 158/80  > , 105, 95, 97    Treatment: Receiving Solu-Medrol, Pulmicort, supplemental oxygen    Thank you,  Rachna Basilio RN, BSN, [facility]  [phone]  Options provided:  -- Acute respiratory failure with hypoxia  -- Acute respiratory failure with hypercapnia  -- Acute respiratory failure with hypoxia and hypercapnia  -- Chronic respiratory failure with hypoxia  -- Chronic respiratory failure with hypercapnia  -- Chronic respiratory failure with hypoxia and hypercapnia  -- Acute on chronic respiratory failure with hypoxia  -- Acute on chronic respiratory failure with hypercapnia  -- Acute on chronic respiratory failure with hypoxia and hypercapnia  -- Other - I will add my own diagnosis  -- Disagree - Not applicable / Not valid  -- Disagree - Clinically unable to determine / Unknown  -- Refer to Clinical Documentation Reviewer    PROVIDER RESPONSE TEXT:    This patient has chronic respiratory failure with hypoxia.     Query created by: Bernabe Burgos Carolina Bailey on 2/4/2022 7:21 AM      Electronically signed by:  Funmilayo Sheriff MD 2/6/2022 6:54 PM

## 2022-02-06 NOTE — ED PROVIDER NOTES
EMERGENCY DEPARTMENT HISTORY AND PHYSICAL EXAM     Date: 2/2/2022  Patient Name: Dickson Arthur     History of Presenting Illness          Chief Complaint   Patient presents with    Shortness of Breath            History Provided By: Patient and EMS     Additional History (Context):   9: 07 PM  Lyssa jang 64 y. o. male with PMHX of COPD with many years of smoking use.  He quit smoking about 10 years ago according the patient, paroxysmal A. 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  This gentleman is home O2 dependent at 2 L COPD exacerbation.  He had a prior history of COVID who presents to the emergency department C/O respiratory difficulty and COPD exacerbation. He was brought by paramedics with limited history but no onset of's breathing difficulty within the last day. Symptoms started earlier today and did not respond to home cough medicine. He had duo nebs as well as albuterol treatment in route. He did not have steroids he is a long-term smoker quit roughly 10 years ago. He denies current use of any steroids. He had a prolonged hospital stay for fever and COPD exacerbation in September 2021. He was diagnosed with COVID 3 weeks before that.     Social History  Denies smoking drinking or drug     Family History  No significant family history     PCP: Irena Wilkerson MD            Current Outpatient Medications   Medication Sig Dispense Refill    chlorthalidone (HYGROTON) 25 mg tablet Take 25 mg by mouth daily.        traMADoL (ULTRAM) 50 mg tablet Take 50 mg by mouth every eight (8) hours as needed for Pain.        gabapentin (NEURONTIN) 100 mg capsule Take 100 mg by mouth three (3) times daily.        apixaban (ELIQUIS) 5 mg tablet Take 1 Tablet by mouth two (2) times a day.  60 Tablet 0    collagenase (SANTYL) 250 unit/gram ointment Apply  to affected area daily. 30 g 1    arformoteroL (BROVANA) 15 mcg/2 mL nebu neb solution 2 mL by Nebulization route two (2) times a day. 30 Vial 0    budesonide (PULMICORT) 0.5 mg/2 mL nbsp 2 mL by Nebulization route two (2) times a day. 30 Each 0    famotidine (PEPCID) 20 mg tablet Take 1 Tab by mouth daily. 30 Tab 0    ferrous sulfate 325 mg (65 mg iron) tablet Take 1 Tab by mouth two (2) times daily (with meals). 30 Tab 0    amLODIPine (NORVASC) 5 mg tablet Take 1 Tab by mouth daily. 30 Tab 0    albuterol-ipratropium (DUO-NEB) 2.5 mg-0.5 mg/3 ml nebu 3 mL by Nebulization route every four (4) hours as needed for Wheezing. (Patient not taking: Reported on 10/9/2021) 30 Nebule 0    albuterol (PROVENTIL HFA, VENTOLIN HFA, PROAIR HFA) 90 mcg/actuation inhaler Take 2 Puffs by inhalation every four (4) hours as needed for Wheezing or Shortness of Breath. 1 Inhaler 1    OXYGEN-AIR DELIVERY SYSTEMS 2 L/min by Nasal route continuous.        furosemide (Lasix) 20 mg tablet Take 20 mg by mouth daily.        dilTIAZem (CARDIZEM) 30 mg tablet Take 1 Tab by mouth Before breakfast, lunch, and dinner. 90 Tab 0    dextran 70/hypromellose (ARTIFICIAL TEARS, PF, OP) Administer 2 Drops to both eyes daily as needed for Other (dry eyes).         tamsulosin (FLOMAX) 0.4 mg capsule Take 0.4 mg by mouth every evening.             Past History      Past Medical History:       Past Medical History:   Diagnosis Date    Brain aneurysm      Brain aneurysm      Cancer ([address]       prostate    CHF (congestive heart failure) (HCC)      CKD (chronic kidney disease)      Closed nondisplaced supracondylar fracture of lower end of left femur without intracondylar extension with delayed healing      COPD (chronic obstructive pulmonary disease) ([address]      Debility      History of home oxygen therapy      [place] (hard of hearing)      Hypertension      Nocturia      On home oxygen therapy      PAF (paroxysmal atrial fibrillation) (HCC)      Pneumonia      Prostate CA ([facility]      Prostate neoplasm      Radiation effect           Past Surgical History:        Past Surgical History:   Procedure Laterality Date    HX HERNIA REPAIR        HX HIP ARTHROSCOPY   04/09/2021         Family History:        Family History   Problem Relation Age of Onset    Heart Disease Mother           Social History:  Social History            Tobacco Use    Smoking status: Former Smoker       Types: Cigarettes    Smokeless tobacco: Never Used   Substance Use Topics    Alcohol use: No    Drug use: Never         Allergies: Allergies   Allergen Reactions    Aspirin Other (comments)       \"messes my stomach up\"    Bactrim [Sulfamethoxazole-Trimethoprim] Unknown (comments)            Review of Systems   Review of Systems   Constitutional: Positive for activity change and fatigue. HENT: Negative. Respiratory: Positive for cough and shortness of breath. Cardiovascular: Negative for chest pain. Gastrointestinal: Positive for nausea. All other systems reviewed and are negative.       Physical Exam            Vitals:     02/02/22 2105 02/02/22 2107 02/02/22 2200   BP: (!) 160/67   (!) 123/58   Pulse: (!) 102   (!) 105   Resp: 24   23   Temp: 98.2 °F (36.8 °C)       SpO2: 98% 100%     Weight: 84.1 kg (185 lb 6.5 oz)        Physical Exam  Vitals and nursing note reviewed. Constitutional:       General: He is not in acute distress. Appearance: He is well-developed. He is ill-appearing (Chronically ill-appearing). He is not toxic-appearing or diaphoretic. Interventions: Face mask in place. HENT:      Head: Normocephalic and atraumatic. Eyes:      General: No scleral icterus. Extraocular Movements:      Right eye: Normal extraocular motion. Left eye: Normal extraocular motion. Conjunctiva/sclera: Conjunctivae normal.      Pupils: Pupils are equal, round, and reactive to light.    Neck: Trachea: No tracheal deviation. Cardiovascular:      Rate and Rhythm: Regular rhythm. Tachycardia present. Heart sounds: Normal heart sounds. Pulmonary:      Effort: Pulmonary effort is normal.      Breath sounds: No stridor. Comments: Increased work of breathing with use of respiratory muscles. Is speaking in 5-7 word sentences. There is inspiratory and expiratory wheezing to all lung fields. Abdominal:      General: Bowel sounds are normal. There is no distension. Palpations: Abdomen is soft. Tenderness: There is no abdominal tenderness. There is no rebound. Musculoskeletal:         General: No tenderness. Normal range of motion. Cervical back: Normal range of motion and neck supple. Comments: Grossly unremarkable without abnormalities   Skin:     General: Skin is warm and dry. Capillary Refill: Capillary refill takes less than 2 seconds. Findings: No erythema or rash. Neurological:      Mental Status: He is oriented to person, place, and time. GCS: GCS eye subscore is 4. GCS verbal subscore is 5. GCS motor subscore is 6. Cranial Nerves: No cranial nerve deficit. Motor: No weakness. Coordination: Coordination is intact. Gait: Gait is intact. Psychiatric:         Mood and Affect: Mood normal.         Behavior: Behavior normal.         Thought Content:  Thought content normal.         Judgment: Judgment normal.        Diagnostic Study Results      Labs -  Recent Results         Recent Results (from the past 24 hour(s))   CBC WITH AUTOMATED DIFF     Collection Time: 02/02/22  9:13 PM   Result Value Ref Range     WBC 14.7 (H) 4.6 - 13.2 K/uL     RBC 4.09 (L) 4.35 - 5.65 M/uL     HGB 10.9 (L) 13.0 - 16.0 g/dL     HCT 34.8 (L) 36.0 - 48.0 %     MCV 85.1 78.0 - 100.0 FL     MCH 26.7 24.0 - 34.0 PG     MCHC 31.3 31.0 - 37.0 g/dL     RDW 13.5 11.6 - 14.5 %     PLATELET 306 156 - 313 K/uL     MPV 9.0 (L) 9.2 - 11.8 FL     NRBC 0.0 0  WBC     ABSOLUTE NRBC 0.00 0.00 - 0.01 K/uL     NEUTROPHILS 70 40 - 73 %     LYMPHOCYTES 7 (L) 21 - 52 %     MONOCYTES 7 3 - 10 %     EOSINOPHILS 14 (H) 0 - 5 %     BASOPHILS 1 0 - 2 %     IMMATURE GRANULOCYTES 1 (H) 0.0 - 0.5 %     ABS. NEUTROPHILS 10.4 (H) 1.8 - 8.0 K/UL     ABS. LYMPHOCYTES 1.0 0.9 - 3.6 K/UL     ABS. MONOCYTES 1.0 0.05 - 1.2 K/UL     ABS. EOSINOPHILS 2.1 (H) 0.0 - 0.4 K/UL     ABS. BASOPHILS 0.1 0.0 - 0.1 K/UL     ABS. IMM. GRANS. 0.1 (H) 0.00 - 0.04 K/UL     DF AUTOMATED       PLATELET COMMENTS LARGE PLATELETS       RBC COMMENTS ANISOCYTOSIS  SIGHT        METABOLIC PANEL, COMPREHENSIVE     Collection Time: 02/02/22  9:13 PM   Result Value Ref Range     Sodium 138 136 - 145 mmol/L     Potassium 3.6 3.5 - 5.5 mmol/L     Chloride 106 100 - 111 mmol/L     CO2 25 21 - 32 mmol/L     Anion gap 7 3.0 - 18 mmol/L     Glucose 134 (H) 74 - 99 mg/dL     BUN 28 (H) 7.0 - 18 MG/DL     Creatinine 1.37 (H) 0.6 - 1.3 MG/DL     BUN/Creatinine ratio 20 12 - 20       GFR est AA >60 >60 ml/min/1.73m2     GFR est non-AA 50 (L) >60 ml/min/1.73m2     Calcium 8.8 8.5 - 10.1 MG/DL     Bilirubin, total 0.3 0.2 - 1.0 MG/DL     ALT (SGPT) 12 (L) 16 - 61 U/L     AST (SGOT) 12 10 - 38 U/L     Alk.  phosphatase 112 45 - 117 U/L     Protein, total 6.9 6.4 - 8.2 g/dL     Albumin 3.0 (L) 3.4 - 5.0 g/dL     Globulin 3.9 2.0 - 4.0 g/dL     A-G Ratio 0.8 0.8 - 1.7     NT-PRO BNP     Collection Time: 02/02/22  9:13 PM   Result Value Ref Range     NT pro- 0 - 1,800 PG/ML   TROPONIN-HIGH SENSITIVITY     Collection Time: 02/02/22  9:13 PM   Result Value Ref Range     Troponin-High Sensitivity 8 0 - 78 ng/L            Radiologic Studies -   XR CHEST PORT   Final Result       No active cardiopulmonary disease.              CT Results  (Last 48 hours)     None                  CXR Results  (Last 48 hours)                 02/02/22 2133   XR CHEST PORT Final result     Impression:       No active cardiopulmonary disease.        Narrative:   EXAM: CHEST RADIOGRAPH       CLINICAL INDICATION/HISTORY: sob   -Additional: None       COMPARISON: 9/30/2021       TECHNIQUE: Portable frontal view of the chest       _______________       FINDINGS:       SUPPORT DEVICES: None. HEART AND MEDIASTINUM: No appreciable cardiomegaly. Remaining mediastinal   contours within normal limits. LUNGS AND PLEURAL SPACES: Unusual pattern of scarring with calcification in the   right lung base is unchanged. Otherwise, the lungs are clear. No vascular   congestion. No effusions. BONY THORAX AND SOFT TISSUES: Unremarkable.       _______________                    Medications given in the ED-  Medications   albuterol-ipratropium (DUO-NEB) 2.5 MG-0.5 MG/3 ML (3 mL Nebulization Given 2/2/22 2119)   albuterol-ipratropium (DUO-NEB) 2.5 MG-0.5 MG/3 ML (3 mL Nebulization Given 2/2/22 2117)   methylPREDNISolone (PF) (Solu-MEDROL) injection 125 mg (125 mg IntraVENous Given 2/2/22 2117)            Medical Decision Making   I am the first provider for this patient.     I reviewed the vital signs, available nursing notes, past medical history, past surgical history, family history and social history.     Vital Signs-Reviewed the patient's vital signs.     Pulse Oximetry Analysis -  98% on face tent     Cardiac Monitor:  Rate: 108 bpm  Rhythm: Sinus tach     EKG interpretation: (Preliminary)  9:04 PM    Sinus tachycardia, rate 103, normal ST segments subtle motion artifact, QTC is 463. EKG read by Bernardo Rodrigez MD        Records Reviewed: NURSING NOTES AND PREVIOUS MEDICAL RECORDS     Provider Notes (Medical Decision Making):   Patient O2 dependent COPD exacerbation. He is get sinus tachycardia at 183. His white count is elevated although he denies any current use of steroids.   Chest x-ray shows asymmetric findings with lesions strongly suspicious for new malignancy or pneumonia however in comparing old films this looks to be under greater density of old changes. Formal reads in the past suggest chronic interstitial disease. Denita Vines Possible but unlikely this is ACS or pulmonary embolism presentation examination findings are consistent with COPD exacerbation. After paramedic treatment and orders added he continues to wheeze. Will consider atypical pneumonia or dehydration obscuring pulmonary findings. We will test him for COVID asked hospital for admission. CHF ACS are also possible.     Procedures:  Procedures     ED Course:   9:04 PM : Initial assessment performed. The patients presenting problems have been discussed, and they are in agreement with the care plan formulated and outlined with them. I have encouraged them to ask questions as they arise throughout their visit.     Diagnosis and Disposition      Critical Care Time: 55 minutes  CRITICAL CARE NOTE:  1:41 AM  I have spent 55 minutes of critical care time involved in lab review, consultations with specialist, family decision-making, and documentation. During this entire length of time I was immediately available to the patient. Critical Care: The reason for providing this level of medical care for this critically ill patient was due a critical illness that impaired one or more vital organ systems such that there was a high probability of imminent or life threatening deterioration in the patients condition. This care involved high complexity decision making to assess, manipulate, and support vital system functions, to treat this degreee vital organ system failure and to prevent further life threatening deterioration of the patients condition. Core Measures:  For Hospitalized Patients:    1. Hospitalization Decision Time:  The decision to hospitalize the patient was made by Stephen Sexton MD   at 11:59 p.m. on 2/2/2022    2.  Aspirin: Aspirin was not given because the patient did not present with a stroke at the time of their Emergency Department evaluation    1:41 AM  Patient is being admitted to the hospital by Dr. Shu Malone. The results of their tests and reasons for their admission have been discussed with them and/or available family. They convey agreement and understanding for the need to be admitted and for their admission diagnosis. CONDITIONS ON ADMISSION:  Sepsis is not present at the time of admission. Deep Vein Thrombosis is not present at the time of admission. Thrombosis is not present at the time of admission. Urinary Tract Infection is not present at the time of admission. Pneumonia is not present at the time of admission. MRSA is not present at the time of admission. Wound infection is not present at the time of admission. Pressure Ulcer IS NOT present at the time of admission. CLINICAL IMPRESSION:    1. Acute respiratory distress    2. Acute exacerbation of chronic obstructive pulmonary disease (COPD) ([address]    3. Shortness of breath on exertion                    _______________________________     This note was partially transcribed via voice recognition software. Although efforts have been made to catch any discrepancies, it may contain sound alike words, grammatical errors, or nonsensical words.                  Doreen Holt MD  2/2/2022 21:53 - Draft  Expand All Collapse All        []Hide copied text    []Hover for details    EMERGENCY DEPARTMENT HISTORY AND PHYSICAL EXAM     Date: 2/2/2022  Patient Name: Xenia Mohs     History of Presenting Illness          Chief Complaint   Patient presents with    Shortness of Breath            History Provided By: Patient and EMS     Additional History (Context):   9:27 PM  Xenia Mohs is a 64 y.o. male with PMHX of COPD with many years of smoking use.   He quit smoking about 10 years ago according the patient, paroxysmal A. 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 This gentleman is home O2 dependent at 2 L COPD exacerbation. He had a prior history of COVID     Social History       Family History       PCP: Ambrosio Souza MD            Current Outpatient Medications   Medication Sig Dispense Refill    chlorthalidone (HYGROTON) 25 mg tablet Take 25 mg by mouth daily.        traMADoL (ULTRAM) 50 mg tablet Take 50 mg by mouth every eight (8) hours as needed for Pain.        gabapentin (NEURONTIN) 100 mg capsule Take 100 mg by mouth three (3) times daily.        apixaban (ELIQUIS) 5 mg tablet Take 1 Tablet by mouth two (2) times a day. 60 Tablet 0    collagenase (SANTYL) 250 unit/gram ointment Apply  to affected area daily. 30 g 1    arformoteroL (BROVANA) 15 mcg/2 mL nebu neb solution 2 mL by Nebulization route two (2) times a day. 30 Vial 0    budesonide (PULMICORT) 0.5 mg/2 mL nbsp 2 mL by Nebulization route two (2) times a day. 30 Each 0    famotidine (PEPCID) 20 mg tablet Take 1 Tab by mouth daily. 30 Tab 0    ferrous sulfate 325 mg (65 mg iron) tablet Take 1 Tab by mouth two (2) times daily (with meals). 30 Tab 0    amLODIPine (NORVASC) 5 mg tablet Take 1 Tab by mouth daily. 30 Tab 0    albuterol-ipratropium (DUO-NEB) 2.5 mg-0.5 mg/3 ml nebu 3 mL by Nebulization route every four (4) hours as needed for Wheezing. (Patient not taking: Reported on 10/9/2021) 30 Nebule 0    albuterol (PROVENTIL HFA, VENTOLIN HFA, PROAIR HFA) 90 mcg/actuation inhaler Take 2 Puffs by inhalation every four (4) hours as needed for Wheezing or Shortness of Breath. 1 Inhaler 1    OXYGEN-AIR DELIVERY SYSTEMS 2 L/min by Nasal route continuous.        furosemide (Lasix) 20 mg tablet Take 20 mg by mouth daily.        dilTIAZem (CARDIZEM) 30 mg tablet Take 1 Tab by mouth Before breakfast, lunch, and dinner. 90 Tab 0    dextran 70/hypromellose (ARTIFICIAL TEARS, PF, OP) Administer 2 Drops to both eyes daily as needed for Other (dry eyes).         tamsulosin (FLOMAX) 0.4 mg capsule Take 0.4 mg by mouth every evening.             Past History      Past Medical History:       Past Medical History:   Diagnosis Date    Brain aneurysm      Brain aneurysm      Cancer ([facility]       prostate    CHF (congestive heart failure) (HCC)      CKD (chronic kidney disease)      Closed nondisplaced supracondylar fracture of lower end of left femur without intracondylar extension with delayed healing      COPD (chronic obstructive [facility] disease) ([facility]      Debility      History of home oxygen therapy      [place] (hard of hearing)      Hypertension      Nocturia      On home oxygen therapy      PAF (paroxysmal atrial fibrillation) (HCC)      Pneumonia      Prostate CA (HCC)      Prostate neoplasm      Radiation effect           Past Surgical History:        Past Surgical History:   Procedure Laterality Date    HX HERNIA REPAIR        HX HIP ARTHROSCOPY   04/09/2021         Family History:        Family History   Problem Relation Age of Onset    Heart Disease Mother           Social History:  Social History            Tobacco Use    Smoking status: Former Smoker       Types: Cigarettes    Smokeless tobacco: Never Used   Substance Use Topics    Alcohol use: No    Drug use: Never         Allergies:         Allergies   Allergen Reactions    Aspirin Other (comments)       \"messes my stomach up\"    Bactrim [Sulfamethoxazole-Trimethoprim] Unknown (comments)              Physical Exam           Vitals:     02/02/22 2105 02/02/22 2107   BP: (!) 160/67     Pulse: (!) 102     Resp: 24     Temp: 98.2 °F (36.8 °C)     SpO2: 98% 100%   Weight: 84.1 kg (185 lb 6.5 oz)        Labs -  Recent Results         Recent Results (from the past 24 hour(s))   CBC WITH AUTOMATED DIFF     Collection Time: 02/02/22  9:13 PM   Result Value Ref Range     WBC 14.7 (H) 4.6 - 13.2 K/uL     RBC 4.09 (L) 4.35 - 5.65 M/uL     HGB 10.9 (L) 13.0 - 16.0 g/dL     HCT 34.8 (L) 36.0 - 48.0 %     MCV 85.1 78.0 - 100.0 FL     MCH 26.7 24.0 - 34.0 PG     MCHC 31.3 31.0 - 37.0 g/dL     RDW 13.5 11.6 - 14.5 %     PLATELET 001 843 - 844 K/uL     MPV 9.0 (L) 9.2 - 11.8 FL     NRBC 0.0 0  WBC     ABSOLUTE NRBC 0.00 0.00 - 0.01 K/uL     NEUTROPHILS PENDING %     LYMPHOCYTES PENDING %     MONOCYTES PENDING %     EOSINOPHILS PENDING %     BASOPHILS PENDING %     IMMATURE GRANULOCYTES PENDING %     ABS. NEUTROPHILS PENDING K/UL     ABS. LYMPHOCYTES PENDING K/UL     ABS. MONOCYTES PENDING K/UL     ABS. EOSINOPHILS PENDING K/UL     ABS. BASOPHILS PENDING K/UL     ABS. IMM. GRANS. PENDING K/UL     DF PENDING              Radiologic Studies -   XR CHEST PORT    (Results Pending)          CT Results  (Last 48 hours)     None              CXR Results  (Last 48 hours)     None             Medications given in the ED-  Medications   albuterol-ipratropium (DUO-NEB) 2.5 MG-0.5 MG/3 ML (3 mL Nebulization Given 2/2/22 2119)   albuterol-ipratropium (DUO-NEB) 2.5 MG-0.5 MG/3 ML (3 mL Nebulization Given 2/2/22 2117)   methylPREDNISolone (PF) (Solu-MEDROL) injection 125 mg (125 mg IntraVENous Given 2/2/22 2117)            Medical Decision Making   I am the first provider for this patient.     I reviewed the vital signs, available nursing notes, past medical history, past surgical history, family history and social history.     Vital Signs-Reviewed the patient's vital signs.               Records Reviewed: NURSING NOTES AND PREVIOUS MEDICAL RECORDS        ED Course:   9:27 PM: Initial assessment performed. The patients presenting problems have been discussed, and they are in agreement with the care plan formulated and outlined with them. I have encouraged them to ask questions as they arise throughout their visit.     Diagnosis and Disposition         DISCHARGE NOTE:    Karina Paz  results have been reviewed with him. He has been counseled regarding his diagnosis, treatment, and plan.   He verbally conveys understanding and agreement of the signs, symptoms, diagnosis, treatment and prognosis and additionally agrees to follow up as discussed. He also agrees with the care-plan and conveys that all of his questions have been answered. I have also provided discharge instructions for him that include: educational information regarding their diagnosis and treatment, and list of reasons why they would want to return to the ED prior to their follow-up appointment, should his condition change. He has been provided with education for proper emergency department utilization.      CLINICAL IMPRESSION:     No diagnosis found.     PLAN:  1. D/C Home  2. Current Discharge Medication List          3. Follow-up Information    None         _______________________________     This note was partially transcribed via voice recognition software. Although efforts have been made to catch any discrepancies, it may contain sound alike words, grammatical errors, or nonsensical words. EMERGENCY DEPARTMENT HISTORY AND PHYSICAL EXAM    Date: 2/2/2022  Patient Name: Josse Youssef    History of Presenting Illness     Chief Complaint   Patient presents with    Shortness of Breath         Physical Exam  Vitals and nursing note reviewed. Constitutional:       General: He is not in acute distress. Appearance: He is well-developed. He is ill-appearing (Chronically ill-appearing). He is not toxic-appearing or diaphoretic. Interventions: Face mask in place. HENT:      Head: Normocephalic and atraumatic. Eyes:      General: No scleral icterus. Extraocular Movements:      Right eye: Normal extraocular motion. Left eye: Normal extraocular motion. Conjunctiva/sclera: Conjunctivae normal.      Pupils: Pupils are equal, round, and reactive to light. Neck:      Trachea: No tracheal deviation. Cardiovascular:      Rate and Rhythm: Regular rhythm. Tachycardia present. Heart sounds: Normal heart sounds.    Pulmonary: Effort: Pulmonary effort is normal.      Breath sounds: No stridor. Comments: Increased work of breathing with use of respiratory muscles. Is speaking in 5-7 word sentences. There is inspiratory and expiratory wheezing to all lung fields. Abdominal:      General: Bowel sounds are normal. There is no distension. Palpations: Abdomen is soft. Tenderness: There is no abdominal tenderness. There is no rebound. Musculoskeletal:         General: No tenderness. Normal range of motion. Cervical back: Normal range of motion and neck supple. Comments: Grossly unremarkable without abnormalities   Skin:     General: Skin is warm and dry. Capillary Refill: Capillary refill takes less than 2 seconds. Findings: No erythema or rash. Neurological:      Mental Status: He is oriented to person, place, and time. GCS: GCS eye subscore is 4. GCS verbal subscore is 5. GCS motor subscore is 6. Cranial Nerves: No cranial nerve deficit. Motor: No weakness. Coordination: Coordination is intact. Gait: Gait is intact. Psychiatric:         Mood and Affect: Mood normal.         Behavior: Behavior normal.         Thought Content: Thought content normal.         Judgment: Judgment normal.       Diagnostic Study Results     Labs -  No results found for this or any previous visit (from the past 24 hour(s)). Radiologic Studies -   DUPLEX LOWER EXT VENOUS BILAT   Final Result      CTA CHEST W OR W WO CONT   Final Result      1. No evidence of pulmonary embolism. 2.  Chronic loculated right pleural effusion. 3. Bilateral lower lobe and right upper lobe scarring and/or chronic   atelectasis. 4. Partial right lower lobe peripheral bronchial opacification. XR CHEST PORT   Final Result      No active cardiopulmonary disease.         CT Results  (Last 48 hours)    None        CXR Results  (Last 48 hours)    None          Medications given in the ED-  Medications   albuterol-ipratropium (DUO-NEB) 2.5 MG-0.5 MG/3 ML (3 mL Nebulization Given 2/2/22 2119)   albuterol-ipratropium (DUO-NEB) 2.5 MG-0.5 MG/3 ML (3 mL Nebulization Given 2/2/22 2117)   methylPREDNISolone (PF) (Solu-MEDROL) injection 125 mg (125 mg IntraVENous Given 2/2/22 2117)   cefTRIAXone (ROCEPHIN) 2 g in 0.9% sodium chloride (MBP/ADV) 50 mL MBP (0 g IntraVENous IV Completed 2/3/22 0350)   azithromycin (ZITHROMAX) 500 mg in 0.9% sodium chloride 250 mL (VIAL-MATE) (0 mg IntraVENous IV Completed 2/3/22 0312)   iopamidoL (ISOVUE-370) 76 % injection 1-75 mL (75 mL IntraVENous Given 2/3/22 0211)   furosemide (LASIX) injection 20 mg (20 mg IntraVENous Given 2/4/22 0940)         Medical Decision Making   I am the first provider for this patient. I reviewed the vital signs, available nursing notes, past medical history, past surgical history, family history and social history. Vital Signs-Reviewed the patient's vital signs. _______________________________    This note was partially transcribed via voice recognition software. Although efforts have been made to catch any discrepancies, it may contain sound alike words, grammatical errors, or nonsensical words.

## 2022-02-07 ENCOUNTER — PATIENT OUTREACH (OUTPATIENT)
Dept: CASE MANAGEMENT | Age: 77
End: 2022-02-07

## 2022-02-07 NOTE — PROGRESS NOTES
Transitions of Care Call  Call within 2 business days of discharge: Yes     Patient: Lucy Fofana Patient : 1945 MRN: [id number]    Last Discharge [address]       Complaint Diagnosis Description Type Department Provider    22 Shortness of Breath Acute respiratory distress . .. ED to Hosp-Admission (Discharged) (ADMIT) [facility] Tom Jordan MD; Lidia. .. Was this an external facility discharge? No Discharge Facility:   / [place] [address] Transitions Nurse ( CTN) attempted to contact patient via telephone call. There was no response. A voicemail message was left requesting a non-emergency return telephone call. CTN  contact information provided.

## 2022-02-08 ENCOUNTER — PATIENT OUTREACH (OUTPATIENT)
Dept: CASE MANAGEMENT | Age: 77
End: 2022-02-08

## 2022-02-08 NOTE — PROGRESS NOTES
Transitions of Care Call  Call within 2 business days of discharge: Yes     Patient: Lucille Myers Patient : 1945 MRN: [id number]    Last Discharge [address]       Complaint Diagnosis Description Type Department Provider    22 Shortness of Breath Acute respiratory distress . .. ED to Hosp-Admission (Discharged) (ADMIT) [facility] Carson Jordan MD; Lidia. .. Was this an external facility discharge? No Discharge Facility:   [facility]     Challenges to be reviewed by the provider   Additional needs identified to be addressed with provider no  none           Care Transitions Nurse ( CTN) attempted to contact patient via telephone call. There was no response. A voicemail message was left requesting a non-emergency return telephone call. CTN  contact information provided. CTN unable to reach patient x2 for follow up. Episode closed.

## 2022-02-10 NOTE — PROGRESS NOTES
Physician Progress Note      Nicolasa Blunt  CSN #:                  [id number]  :                       1945  ADMIT DATE:       2022 9:01 PM  [address] [place] DATE:        2022 2:58 PM  RESPONDING  PROVIDER #:        Kylee Vazquez MD          QUERY TEXT:    Patient was admitted with a COPD exacerbation. They were treated with DuoNebs, Solumedrol, Albuterol, Pulmicort, Prednisone, antibiotics and supplemental oxygen. Patient was noted to have wheezing which improved by discharge. Based on clinical indicators and treatment, can the  patient's respiratory condition be further specified as: The medical record reflects the following:    Risk Factors:    Clinical Indicators:  Admitted with a COPD exacerbation. Pt was treated with DuoNebs, Solumedrol, Albuterol, Pulmicort, Prednisone, antibiotics and supplemental oxygen. Patient was noted to have wheezing which improved by discharge    Treatment:  Receiving  DuoNebs, Solumedrol, Albuterol, Pulmicort, Prednisone, antibiotics and supplemental oxygen    Thank you,  Rowan Keith RN, BSN, CRCR  [email or web address]  Options provided:  -- COPD exacerbation  -- Asthma exacerbation  -- COPD exacerbation and asthma exacerbation  -- Other - I will add my own diagnosis  -- Disagree - Not applicable / Not valid  -- Disagree - Clinically unable to determine / Unknown  -- Refer to Clinical Documentation Reviewer    PROVIDER RESPONSE TEXT:    This patient is being treated for COPD exacerbation.     Query created by: Lissette Laird on 2022 10:26 AM      Electronically signed by:  Kylee Vazquez MD 2/10/2022 10:27 AM

## 2022-02-15 NOTE — PROGRESS NOTES
Problem: Mobility Impaired (Adult and Pediatric)  Goal: *Acute Goals and Plan of Care (Insert Text)  Description: Physical Therapy Goals  Initiated 3/2/2021 and to be accomplished within 6 day(s)  1. Patient will move from supine to sit and sit to supine  in bed with modified independence. 2.  Patient will transfer from bed to chair and chair to bed with modified independence using the least restrictive device. 3.  Patient will perform sit to stand with modified independence. 4.  Patient will ambulate with modified independence for 150 feet with the least restrictive device. 5.  Patient will ascend/descend 6 stairs with handrail(s) with supervision/set-up. Outcome: Progressing Towards Goal   PHYSICAL THERAPY EVALUATION    Patient: Supriya Nielsen (82 y.o. male)  Date: 3/2/2021  Primary Diagnosis: Acute respiratory failure ([facility] [address] [J96.00]  Precautions: Fall  PLOF: Mod I  ASSESSMENT :  Supervision for supine to sit. Good seated balance. Min A for sit to stand with additional time and momentum to achieve standing. Amb 20ft with ww and min A. Returned to seated EOB with supervision. Decreased safety awareness with hand placement and transfers. Seated rest 2 minutes to recover from increased work of breathing. On 1.5 L O2 during session; 2L O2 baseline. Supervision for sit to supine. Educated on need for RN assistance with mobility; verbalized understanding. Call bell in reach. Patient will benefit from skilled intervention to address the above impairments.   Patient's rehabilitation potential is considered to be Fair  Factors which may influence rehabilitation potential include:   []         None noted  []         Mental ability/status  [x]         Medical condition  []         Home/family situation and support systems  []         Safety awareness  []         Pain tolerance/management  []         Other:      PLAN :  Recommendations and Planned Interventions:   [x]           Bed Mobility Training [x]    Neuromuscular Re-Education  [x]           Transfer Training                   []    Orthotic/Prosthetic Training  [x]           Gait Training                          []    Modalities  [x]           Therapeutic Exercises           []    Edema Management/Control  [x]           Therapeutic Activities            [x]    Family Training/Education  [x]           Patient Education  []           Other (comment):    Frequency/Duration: Patient will be followed by physical therapy 3-5 times a week to address goals. Discharge Recommendations: Home Health  Further Equipment Recommendations for Discharge: rolling walker; has     SUBJECTIVE:   Patient stated I'm okay.     OBJECTIVE DATA SUMMARY:     Past Medical History:   Diagnosis Date    Brain aneurysm     Cancer ([facility] [address]     prostate    COPD (chronic obstructive pulmonary disease) ([facility] [address]     Hypertension     Nocturia     Pneumonia     Radiation effect      Past Surgical History:   Procedure Laterality Date    HX HERNIA REPAIR       Barriers to Learning/Limitations: yes;  sensory deficits-vision/hearing/speech  Compensate with: Visual Cues, Verbal Cues, Tactile Cues and Kinesthetic Cues    Home Situation:  Home Situation  Home Environment: Private residence  # Steps to Enter: 6  Rails to Enter: Yes  One/Two Story Residence: One story  Living Alone: No  Support Systems: Spouse/Significant Other/Partner  Patient Expects to be Discharged to[de-identified] Private residence  Current DME Used/Available at Home: [employer] Dom, rolling, Cane, straight    Critical Behavior:  Neurologic State: Alert  Orientation Level: Oriented X4  Cognition: Follows commands     Psychosocial  Patient Behaviors: Cooperative    Strength:    Manual Muscle Testing (LE)         R     L    Hip Flexion:   4+/5  4+/5  Knee EXT:   4+/5  4+/5  Knee FLEX:   4+/5  4+/5  Ankle DF:   4+/5  4+/5    Range Of Motion:  BLE AROM WFL  Functional Mobility:  Bed Mobility:  Supine to Sit: Supervision  Sit to Supine: Supervision  Transfers:  Sit to Stand: Minimum assistance  Stand to Sit: Supervision  Balance:   Sitting: Impaired  Sitting - Static: Good (unsupported)  Sitting - Dynamic: Good (unsupported)  Standing: Impaired  Standing - Static: Good  Standing - Dynamic : Good  Ambulation/Gait Training:  Distance (ft): 20 Feet (ft)   Assistive Device: Walker, rolling  Ambulation - Level of Assistance: Minimal assistance  Pain:  Pain level pre-treatment: 0/10   Pain level post-treatment: 0/10     Activity Tolerance:   Fair    After treatment:   []         Patient left in no apparent distress sitting up in chair  [x]         Patient left in no apparent distress in bed  [x]         Call bell left within reach  [x]         Nursing notified  []         Caregiver present  []         Bed alarm activated  []         SCDs applied    COMMUNICATION/EDUCATION:   [x]         Role of physical therapy and plan of care in the acute care setting. [x]         Fall prevention education was provided and the patient/caregiver indicated understanding. [x]         Patient/family have participated as able in goal setting and plan of care. []         Patient/family agree to work toward stated goals and plan of care. []         Patient understands intent and goals of therapy, but is neutral about his/her participation. []         Patient is unable to participate in goal setting/plan of care: ongoing with therapy staff.     Thank you for this referral.  Pia Vega, PT   Time Calculation: 14 mins    Eval Complexity: History: MEDIUM  Complexity : 1-2 comorbidities / personal factors will impact the outcome/ POC Exam:MEDIUM Complexity : 3 Standardized tests and measures addressing body structure, function, activity limitation and / or participation in recreation  Presentation: MEDIUM Complexity : Evolving with changing characteristics  Clinical Decision Making:Medium Complexity    Clinical judgement; ROM, MMT, functional mobility Overall Complexity:MEDIUM none

## 2022-03-18 PROBLEM — J92.0 CALCIFIED PLEURAL PLAQUE DUE TO ASBESTOS EXPOSURE: Status: ACTIVE | Noted: 2022-02-03

## 2022-03-18 PROBLEM — J44.9 CHRONIC OBSTRUCTIVE PULMONARY DISEASE (HCC): Status: ACTIVE | Noted: 2018-04-20

## 2022-03-18 PROBLEM — D64.9 ANEMIA: Status: ACTIVE | Noted: 2021-04-11

## 2022-03-19 ENCOUNTER — APPOINTMENT (OUTPATIENT)
Dept: GENERAL RADIOLOGY | Age: 77
DRG: 191 | End: 2022-03-19
Attending: EMERGENCY MEDICINE
Payer: MEDICARE

## 2022-03-19 ENCOUNTER — HOSPITAL ENCOUNTER (INPATIENT)
Age: 77
LOS: 3 days | Discharge: HOME | DRG: 191 | End: 2022-03-22
Attending: EMERGENCY MEDICINE | Admitting: INTERNAL MEDICINE
Payer: MEDICARE

## 2022-03-19 DIAGNOSIS — J44.1 ACUTE EXACERBATION OF CHRONIC OBSTRUCTIVE PULMONARY DISEASE (COPD) (HCC): Primary | ICD-10-CM

## 2022-03-19 PROBLEM — J96.21 ACUTE ON CHRONIC RESPIRATORY FAILURE WITH HYPOXEMIA (HCC): Status: ACTIVE | Noted: 2019-08-19

## 2022-03-19 PROBLEM — M87.052 AVASCULAR NECROSIS OF BONE OF LEFT HIP (HCC): Status: ACTIVE | Noted: 2021-04-08

## 2022-03-19 PROBLEM — N18.30 CHRONIC RENAL DISEASE, STAGE 3, MODERATELY DECREASED GLOMERULAR FILTRATION RATE BETWEEN 30-59 ML/MIN/1.73 SQUARE METER (HCC): Status: ACTIVE | Noted: 2018-07-20

## 2022-03-19 PROBLEM — H91.93 BILATERAL DEAFNESS: Status: ACTIVE | Noted: 2022-02-03

## 2022-03-19 PROBLEM — R91.8 RIGHT LOWER LOBE LUNG MASS: Status: ACTIVE | Noted: 2022-02-03

## 2022-03-19 PROBLEM — I82.411 ACUTE DEEP VEIN THROMBOSIS (DVT) OF FEMORAL VEIN OF RIGHT LOWER EXTREMITY (HCC): Status: ACTIVE | Noted: 2022-02-03

## 2022-03-19 PROBLEM — J90 LOCULATED PLEURAL EFFUSION: Status: ACTIVE | Noted: 2022-02-03

## 2022-03-19 PROBLEM — I48.0 PAF (PAROXYSMAL ATRIAL FIBRILLATION) (HCC): Status: ACTIVE | Noted: 2021-04-05

## 2022-03-19 LAB
ALBUMIN SERPL-MCNC: 2.6 G/DL (ref 3.4–5)
ALBUMIN/GLOB SERPL: 0.7 {RATIO} (ref 0.8–1.7)
ALP SERPL-CCNC: 172 U/L (ref 45–117)
ALT SERPL-CCNC: 13 U/L (ref 16–61)
ANION GAP SERPL CALC-SCNC: 14 MMOL/L (ref 3–18)
AST SERPL-CCNC: 7 U/L (ref 10–38)
BASOPHILS # BLD: 0.1 K/UL (ref 0–0.1)
BASOPHILS NFR BLD: 0 % (ref 0–2)
BILIRUB SERPL-MCNC: 0.5 MG/DL (ref 0.2–1)
BNP SERPL-MCNC: 882 PG/ML (ref 0–1800)
BUN SERPL-MCNC: 43 MG/DL (ref 7–18)
BUN/CREAT SERPL: 28 (ref 12–20)
CALCIUM SERPL-MCNC: 8.7 MG/DL (ref 8.5–10.1)
CHLORIDE SERPL-SCNC: 101 MMOL/L (ref 100–111)
CO2 SERPL-SCNC: 20 MMOL/L (ref 21–32)
CREAT SERPL-MCNC: 1.52 MG/DL (ref 0.6–1.3)
DIFFERENTIAL METHOD BLD: ABNORMAL
EOSINOPHIL # BLD: 0.1 K/UL (ref 0–0.4)
EOSINOPHIL NFR BLD: 0 % (ref 0–5)
ERYTHROCYTE [DISTWIDTH] IN BLOOD BY AUTOMATED COUNT: 14.7 % (ref 11.6–14.5)
FLUAV AG NPH QL IA: NEGATIVE
FLUBV AG NOSE QL IA: NEGATIVE
GLOBULIN SER CALC-MCNC: 3.8 G/DL (ref 2–4)
GLUCOSE SERPL-MCNC: 115 MG/DL (ref 74–99)
HCT VFR BLD AUTO: 32.2 % (ref 36–48)
HGB BLD-MCNC: 10.4 G/DL (ref 13–16)
IMM GRANULOCYTES # BLD AUTO: 0.2 K/UL (ref 0–0.04)
IMM GRANULOCYTES NFR BLD AUTO: 1 % (ref 0–0.5)
LACTATE SERPL-SCNC: 0.6 MMOL/L (ref 0.4–2)
LYMPHOCYTES # BLD: 0.6 K/UL (ref 0.9–3.6)
LYMPHOCYTES NFR BLD: 4 % (ref 21–52)
MCH RBC QN AUTO: 27.1 PG (ref 24–34)
MCHC RBC AUTO-ENTMCNC: 32.3 G/DL (ref 31–37)
MCV RBC AUTO: 83.9 FL (ref 78–100)
MONOCYTES # BLD: 0.6 K/UL (ref 0.05–1.2)
MONOCYTES NFR BLD: 4 % (ref 3–10)
NEUTS SEG # BLD: 13 K/UL (ref 1.8–8)
NEUTS SEG NFR BLD: 90 % (ref 40–73)
NRBC # BLD: 0 K/UL (ref 0–0.01)
NRBC BLD-RTO: 0 PER 100 WBC
PLATELET # BLD AUTO: 453 K/UL (ref 135–420)
PMV BLD AUTO: 9.1 FL (ref 9.2–11.8)
POTASSIUM SERPL-SCNC: 3.6 MMOL/L (ref 3.5–5.5)
PROT SERPL-MCNC: 6.4 G/DL (ref 6.4–8.2)
RBC # BLD AUTO: 3.84 M/UL (ref 4.35–5.65)
SODIUM SERPL-SCNC: 135 MMOL/L (ref 136–145)
TROPONIN-HIGH SENSITIVITY: 4 NG/L (ref 0–78)
WBC # BLD AUTO: 14.6 K/UL (ref 4.6–13.2)

## 2022-03-19 PROCEDURE — 94660 CPAP INITIATION&MGMT: CPT

## 2022-03-19 PROCEDURE — 71045 X-RAY EXAM CHEST 1 VIEW: CPT

## 2022-03-19 PROCEDURE — 74011250636 HC RX REV CODE- 250/636: Performed by: EMERGENCY MEDICINE

## 2022-03-19 PROCEDURE — 5A09457 ASSISTANCE WITH RESPIRATORY VENTILATION, 24-96 CONSECUTIVE HOURS, CONTINUOUS POSITIVE AIRWAY PRESSURE: ICD-10-PCS | Performed by: HOSPITALIST

## 2022-03-19 PROCEDURE — 65660000000 HC RM CCU STEPDOWN

## 2022-03-19 PROCEDURE — 74011250636 HC RX REV CODE- 250/636: Performed by: INTERNAL MEDICINE

## 2022-03-19 PROCEDURE — 83880 ASSAY OF NATRIURETIC PEPTIDE: CPT

## 2022-03-19 PROCEDURE — 99285 EMERGENCY DEPT VISIT HI MDM: CPT

## 2022-03-19 PROCEDURE — 74011000250 HC RX REV CODE- 250: Performed by: INTERNAL MEDICINE

## 2022-03-19 PROCEDURE — C9113 INJ PANTOPRAZOLE SODIUM, VIA: HCPCS | Performed by: INTERNAL MEDICINE

## 2022-03-19 PROCEDURE — 94640 AIRWAY INHALATION TREATMENT: CPT

## 2022-03-19 PROCEDURE — 80053 COMPREHEN METABOLIC PANEL: CPT

## 2022-03-19 PROCEDURE — 93005 ELECTROCARDIOGRAM TRACING: CPT

## 2022-03-19 PROCEDURE — 87040 BLOOD CULTURE FOR BACTERIA: CPT

## 2022-03-19 PROCEDURE — 96374 THER/PROPH/DIAG INJ IV PUSH: CPT

## 2022-03-19 PROCEDURE — 87804 INFLUENZA ASSAY W/OPTIC: CPT

## 2022-03-19 PROCEDURE — 83605 ASSAY OF LACTIC ACID: CPT

## 2022-03-19 PROCEDURE — 84484 ASSAY OF TROPONIN QUANT: CPT

## 2022-03-19 PROCEDURE — 85025 COMPLETE CBC W/AUTO DIFF WBC: CPT

## 2022-03-19 PROCEDURE — 74011250637 HC RX REV CODE- 250/637: Performed by: INTERNAL MEDICINE

## 2022-03-19 PROCEDURE — 74011000250 HC RX REV CODE- 250: Performed by: EMERGENCY MEDICINE

## 2022-03-19 PROCEDURE — 74011000258 HC RX REV CODE- 258: Performed by: EMERGENCY MEDICINE

## 2022-03-19 RX ORDER — POLYVINYL ALCOHOL 14 MG/ML
1 SOLUTION/ DROPS OPHTHALMIC AS NEEDED
Status: DISCONTINUED | OUTPATIENT
Start: 2022-03-19 | End: 2022-03-22 | Stop reason: HOSPADM

## 2022-03-19 RX ORDER — IPRATROPIUM BROMIDE AND ALBUTEROL SULFATE 2.5; .5 MG/3ML; MG/3ML
3 SOLUTION RESPIRATORY (INHALATION) ONCE
Status: COMPLETED | OUTPATIENT
Start: 2022-03-19 | End: 2022-03-19

## 2022-03-19 RX ORDER — ALBUTEROL SULFATE 90 UG/1
2 AEROSOL, METERED RESPIRATORY (INHALATION)
Status: DISCONTINUED | OUTPATIENT
Start: 2022-03-19 | End: 2022-03-22 | Stop reason: HOSPADM

## 2022-03-19 RX ORDER — TRAMADOL HYDROCHLORIDE 50 MG/1
50 TABLET ORAL
Status: DISCONTINUED | OUTPATIENT
Start: 2022-03-19 | End: 2022-03-22 | Stop reason: HOSPADM

## 2022-03-19 RX ORDER — ONDANSETRON 2 MG/ML
4 INJECTION INTRAMUSCULAR; INTRAVENOUS
Status: DISCONTINUED | OUTPATIENT
Start: 2022-03-19 | End: 2022-03-22 | Stop reason: HOSPADM

## 2022-03-19 RX ORDER — ACETAMINOPHEN 325 MG/1
650 TABLET ORAL
Status: DISCONTINUED | OUTPATIENT
Start: 2022-03-19 | End: 2022-03-22 | Stop reason: HOSPADM

## 2022-03-19 RX ORDER — POLYETHYLENE GLYCOL 3350 17 G/17G
17 POWDER, FOR SOLUTION ORAL DAILY PRN
Status: DISCONTINUED | OUTPATIENT
Start: 2022-03-19 | End: 2022-03-22 | Stop reason: HOSPADM

## 2022-03-19 RX ORDER — ARFORMOTEROL TARTRATE 15 UG/2ML
15 SOLUTION RESPIRATORY (INHALATION)
Status: DISCONTINUED | OUTPATIENT
Start: 2022-03-19 | End: 2022-03-22 | Stop reason: HOSPADM

## 2022-03-19 RX ORDER — FUROSEMIDE 20 MG/1
20 TABLET ORAL DAILY
Status: DISCONTINUED | OUTPATIENT
Start: 2022-03-20 | End: 2022-03-22 | Stop reason: HOSPADM

## 2022-03-19 RX ORDER — GABAPENTIN 100 MG/1
100 CAPSULE ORAL 3 TIMES DAILY
Status: DISCONTINUED | OUTPATIENT
Start: 2022-03-19 | End: 2022-03-22 | Stop reason: HOSPADM

## 2022-03-19 RX ORDER — LANOLIN ALCOHOL/MO/W.PET/CERES
325 CREAM (GRAM) TOPICAL 2 TIMES DAILY WITH MEALS
Status: DISCONTINUED | OUTPATIENT
Start: 2022-03-20 | End: 2022-03-22 | Stop reason: HOSPADM

## 2022-03-19 RX ORDER — SODIUM CHLORIDE 0.9 % (FLUSH) 0.9 %
5-40 SYRINGE (ML) INJECTION AS NEEDED
Status: DISCONTINUED | OUTPATIENT
Start: 2022-03-19 | End: 2022-03-22 | Stop reason: HOSPADM

## 2022-03-19 RX ORDER — FAMOTIDINE 20 MG/1
20 TABLET, FILM COATED ORAL DAILY
Status: DISCONTINUED | OUTPATIENT
Start: 2022-03-20 | End: 2022-03-22 | Stop reason: HOSPADM

## 2022-03-19 RX ORDER — BUDESONIDE 0.5 MG/2ML
500 INHALANT ORAL
Status: DISCONTINUED | OUTPATIENT
Start: 2022-03-19 | End: 2022-03-22 | Stop reason: HOSPADM

## 2022-03-19 RX ORDER — IPRATROPIUM BROMIDE AND ALBUTEROL SULFATE 2.5; .5 MG/3ML; MG/3ML
3 SOLUTION RESPIRATORY (INHALATION)
Status: DISCONTINUED | OUTPATIENT
Start: 2022-03-19 | End: 2022-03-22 | Stop reason: HOSPADM

## 2022-03-19 RX ORDER — AMLODIPINE BESYLATE 5 MG/1
5 TABLET ORAL DAILY
Status: DISCONTINUED | OUTPATIENT
Start: 2022-03-20 | End: 2022-03-22 | Stop reason: HOSPADM

## 2022-03-19 RX ORDER — ONDANSETRON 4 MG/1
4 TABLET, ORALLY DISINTEGRATING ORAL
Status: DISCONTINUED | OUTPATIENT
Start: 2022-03-19 | End: 2022-03-22 | Stop reason: HOSPADM

## 2022-03-19 RX ORDER — ACETAMINOPHEN 650 MG/1
650 SUPPOSITORY RECTAL
Status: DISCONTINUED | OUTPATIENT
Start: 2022-03-19 | End: 2022-03-22 | Stop reason: HOSPADM

## 2022-03-19 RX ORDER — TAMSULOSIN HYDROCHLORIDE 0.4 MG/1
0.4 CAPSULE ORAL EVERY EVENING
Status: DISCONTINUED | OUTPATIENT
Start: 2022-03-20 | End: 2022-03-22 | Stop reason: HOSPADM

## 2022-03-19 RX ORDER — SODIUM CHLORIDE 0.9 % (FLUSH) 0.9 %
5-40 SYRINGE (ML) INJECTION EVERY 8 HOURS
Status: DISCONTINUED | OUTPATIENT
Start: 2022-03-19 | End: 2022-03-22 | Stop reason: HOSPADM

## 2022-03-19 RX ORDER — IPRATROPIUM BROMIDE AND ALBUTEROL SULFATE 2.5; .5 MG/3ML; MG/3ML
3 SOLUTION RESPIRATORY (INHALATION)
Status: DISCONTINUED | OUTPATIENT
Start: 2022-03-20 | End: 2022-03-22 | Stop reason: HOSPADM

## 2022-03-19 RX ADMIN — APIXABAN 5 MG: 5 TABLET, FILM COATED ORAL at 23:23

## 2022-03-19 RX ADMIN — BUDESONIDE 500 MCG: 0.5 INHALANT RESPIRATORY (INHALATION) at 23:24

## 2022-03-19 RX ADMIN — SODIUM CHLORIDE, PRESERVATIVE FREE 20 ML: 5 INJECTION INTRAVENOUS at 23:23

## 2022-03-19 RX ADMIN — SODIUM CHLORIDE, PRESERVATIVE FREE 40 MG: 5 INJECTION INTRAVENOUS at 23:24

## 2022-03-19 RX ADMIN — IPRATROPIUM BROMIDE AND ALBUTEROL SULFATE 3 ML: .5; 3 SOLUTION RESPIRATORY (INHALATION) at 19:36

## 2022-03-19 RX ADMIN — IPRATROPIUM BROMIDE AND ALBUTEROL SULFATE 3 ML: .5; 3 SOLUTION RESPIRATORY (INHALATION) at 23:30

## 2022-03-19 RX ADMIN — ARFORMOTEROL TARTRATE 15 MCG: 15 SOLUTION RESPIRATORY (INHALATION) at 23:24

## 2022-03-19 RX ADMIN — METHYLPREDNISOLONE SODIUM SUCCINATE 125 MG: 125 INJECTION, POWDER, FOR SOLUTION INTRAMUSCULAR; INTRAVENOUS at 19:36

## 2022-03-19 RX ADMIN — METHYLPREDNISOLONE SODIUM SUCCINATE 60 MG: 40 INJECTION, POWDER, FOR SOLUTION INTRAMUSCULAR; INTRAVENOUS at 23:30

## 2022-03-19 RX ADMIN — PIPERACILLIN AND TAZOBACTAM 3.38 G: 3; .375 INJECTION, POWDER, LYOPHILIZED, FOR SOLUTION INTRAVENOUS at 21:30

## 2022-03-19 RX ADMIN — GABAPENTIN 100 MG: 100 CAPSULE ORAL at 23:23

## 2022-03-19 NOTE — ED TRIAGE NOTES
Patient arrives via ambulance from home with complaints of SOB that began today, attempted to nebulize water after running out of albuterol, temperature of 100.4 pta, 2-4L NC at home baseline

## 2022-03-19 NOTE — ED PROVIDER NOTES
EMERGENCY DEPARTMENT HISTORY AND PHYSICAL EXAM      Date: 3/19/2022  Patient Name: Zeus Dubon      History of Presenting Illness     Chief Complaint   Patient presents with    Shortness of Breath       History Provided By: Patient and EMS    HPI: Zeus Dubon, 64 y.o. male with a past medical history significant COPD and O2 dependent 2 L nasal cannula, prostate cancer s/p radiation 3/2017, BPH, and aneurysm, hypertension, arthritis,presents to the ED, brought by EMS, with cc of shortness of breath. Patient has history of dementia, is also hard of hearing, EMS called because of shortness of breath. Patient is able to state he ran out of his medications a day ago and began having progressively worsening shortness of breath since last night. He became worse  this evening. EMS report he has been using water as nebulizer today. He admits to cough but no fever, no chest pain, no abdomen pain. There are no other complaints, changes, or physical findings at this time. PCP: Atiya Juarez MD    Current Facility-Administered Medications   Medication Dose Route Frequency Provider Last Rate Last Admin    piperacillin-tazobactam (ZOSYN) 3.375 g in 0.9% sodium chloride (MBP/ADV) 100 mL MBP  3.375 g IntraVENous NOW Johnny Villalba  mL/hr at 03/19/22 2130 3.375 g at 03/19/22 2130     Current Outpatient Medications   Medication Sig Dispense Refill    predniSONE (DELTASONE) 20 mg tablet Take 20 mg by mouth daily (with lunch). 10 Tablet 0    chlorthalidone (HYGROTON) 25 mg tablet Take 25 mg by mouth daily.  traMADoL (ULTRAM) 50 mg tablet Take 50 mg by mouth every eight (8) hours as needed for Pain.  gabapentin (NEURONTIN) 100 mg capsule Take 100 mg by mouth three (3) times daily.  apixaban (ELIQUIS) 5 mg tablet Take 1 Tablet by mouth two (2) times a day. 60 Tablet 0    collagenase (SANTYL) 250 unit/gram ointment Apply  to affected area daily.  30 g 1    arformoteroL (BROVANA) 15 mcg/2 mL nebu neb solution 2 mL by Nebulization route two (2) times a day. 30 Vial 0    budesonide (PULMICORT) 0.5 mg/2 mL nbsp 2 mL by Nebulization route two (2) times a day. 30 Each 0    famotidine (PEPCID) 20 mg tablet Take 1 Tab by mouth daily. 30 Tab 0    ferrous sulfate 325 mg (65 mg iron) tablet Take 1 Tab by mouth two (2) times daily (with meals). 30 Tab 0    amLODIPine (NORVASC) 5 mg tablet Take 1 Tab by mouth daily. 30 Tab 0    albuterol-ipratropium (DUO-NEB) 2.5 mg-0.5 mg/3 ml nebu 3 mL by Nebulization route every four (4) hours as needed for Wheezing. (Patient not taking: Reported on 10/9/2021) 30 Nebule 0    albuterol (PROVENTIL HFA, VENTOLIN HFA, PROAIR HFA) 90 mcg/actuation inhaler Take 2 Puffs by inhalation every four (4) hours as needed for Wheezing or Shortness of Breath. 1 Inhaler 1    OXYGEN-AIR DELIVERY SYSTEMS 2 L/min by Nasal route continuous.  furosemide (Lasix) 20 mg tablet Take 20 mg by mouth daily.  dextran 70/hypromellose (ARTIFICIAL TEARS, PF, OP) Administer 2 Drops to both eyes daily as needed for Other (dry eyes).  tamsulosin (FLOMAX) 0.4 mg capsule Take 0.4 mg by mouth every evening.          Past History     Past Medical History:  Past Medical History:   Diagnosis Date    Brain aneurysm     Brain aneurysm     Cancer ([address]     prostate    CHF (congestive heart failure) (HCC)     CKD (chronic kidney disease)     Closed nondisplaced supracondylar fracture of lower end of left femur without intracondylar extension with delayed healing     COPD (chronic obstructive pulmonary disease) ([address]     Debility     History of home oxygen therapy     [place] (hard of hearing)     Hypertension     Nocturia     On home oxygen therapy     PAF (paroxysmal atrial fibrillation) ([address]     Pneumonia     Prostate CA ([address]     Prostate neoplasm     Radiation effect        Past Surgical History:  Past Surgical History:   Procedure Laterality Date    HX HERNIA REPAIR      HX HIP ARTHROSCOPY  04/09/2021       Family History:  Family History   Problem Relation Age of Onset    Heart Disease Mother        Social History:  Social History     Tobacco Use    Smoking status: Former Smoker     Types: Cigarettes    Smokeless tobacco: Never Used   Substance Use Topics    Alcohol use: No    Drug use: Never       Allergies: Allergies   Allergen Reactions    Aspirin Other (comments)     \"messes my stomach up\"    Bactrim [Sulfamethoxazole-Trimethoprim] Unknown (comments)         Review of Systems     Review of Systems   Unable to perform ROS: Dementia       Physical Exam     Physical Exam  Vitals and nursing note reviewed. Constitutional:       General: He is not in acute distress. Appearance: He is well-developed. He is not diaphoretic. Comments: Appears in no acute distress. RN reports at arrival on room air patient was 100% pulse ox. He was put on he is baseline 2 L nasal cannula oxygen. HENT:      Head: Normocephalic and atraumatic. No right periorbital erythema or left periorbital erythema. Jaw: No trismus. Right Ear: External ear normal. No drainage or swelling. Tympanic membrane is not perforated, erythematous or bulging. Left Ear: External ear normal. No drainage or swelling. Tympanic membrane is not perforated, erythematous or bulging. Nose: Nose normal. No mucosal edema or rhinorrhea. Right Sinus: No maxillary sinus tenderness or frontal sinus tenderness. Left Sinus: No maxillary sinus tenderness or frontal sinus tenderness. Mouth/Throat:      Mouth: No oral lesions. Dentition: No dental abscesses. Pharynx: Uvula midline. No oropharyngeal exudate, posterior oropharyngeal erythema or uvula swelling. Tonsils: No tonsillar abscesses. Eyes:      General: No scleral icterus. Right eye: No discharge. Left eye: No discharge.       Conjunctiva/sclera: Conjunctivae normal.   Cardiovascular:      Rate and Rhythm: Normal rate and regular rhythm. Heart sounds: Normal heart sounds. No murmur heard. No friction rub. No gallop. Pulmonary:      Effort: Pulmonary effort is normal. Tachypnea present. No accessory muscle usage or respiratory distress. Breath sounds: Examination of the right-upper field reveals wheezing. Examination of the left-upper field reveals wheezing. Examination of the right-middle field reveals wheezing. Examination of the left-middle field reveals wheezing. Examination of the right-lower field reveals wheezing. Examination of the left-lower field reveals wheezing. Wheezing present. No decreased breath sounds, rhonchi or rales. Comments: He has diffuse, faint, scattered expiratory wheezes. Abdominal:      General: Bowel sounds are normal. There is no distension. Palpations: Abdomen is soft. Tenderness: There is no abdominal tenderness. Musculoskeletal:         General: No tenderness. Normal range of motion. Cervical back: Normal range of motion and neck supple. Lymphadenopathy:      Cervical: No cervical adenopathy. Skin:     General: Skin is warm and dry. Neurological:      Mental Status: He is alert and oriented to person, place, and time.    Psychiatric:         Judgment: Judgment normal.         Lab and Diagnostic Study Results     Labs -     Recent Results (from the past 12 hour(s))   EKG, 12 LEAD, INITIAL    Collection Time: 03/19/22  7:16 PM   Result Value Ref Range    Ventricular Rate 103 BPM    Atrial Rate 103 BPM    P-R Interval 162 ms    QRS Duration 82 ms    Q-T Interval 360 ms    QTC Calculation (Bezet) 471 ms    Calculated P Axis 63 degrees    Calculated R Axis 27 degrees    Calculated T Axis 57 degrees    Diagnosis       Sinus tachycardia  Otherwise normal ECG  When compared with ECG of 02-FEB-2022 21:04,  No significant change was found     CBC WITH AUTOMATED DIFF    Collection Time: 03/19/22  7:24 PM   Result Value Ref Range    WBC 14.6 (H) 4.6 - 13.2 K/uL    RBC 3.84 (L) 4.35 - 5.65 M/uL    HGB 10.4 (L) 13.0 - 16.0 g/dL    HCT 32.2 (L) 36.0 - 48.0 %    MCV 83.9 78.0 - 100.0 FL    MCH 27.1 24.0 - 34.0 PG    MCHC 32.3 31.0 - 37.0 g/dL    RDW 14.7 (H) 11.6 - 14.5 %    PLATELET 492 (H) 172 - 420 K/uL    MPV 9.1 (L) 9.2 - 11.8 FL    NRBC 0.0 0  WBC    ABSOLUTE NRBC 0.00 0.00 - 0.01 K/uL    NEUTROPHILS 90 (H) 40 - 73 %    LYMPHOCYTES 4 (L) 21 - 52 %    MONOCYTES 4 3 - 10 %    EOSINOPHILS 0 0 - 5 %    BASOPHILS 0 0 - 2 %    IMMATURE GRANULOCYTES 1 (H) 0.0 - 0.5 %    ABS. NEUTROPHILS 13.0 (H) 1.8 - 8.0 K/UL    ABS. LYMPHOCYTES 0.6 (L) 0.9 - 3.6 K/UL    ABS. MONOCYTES 0.6 0.05 - 1.2 K/UL    ABS. EOSINOPHILS 0.1 0.0 - 0.4 K/UL    ABS. BASOPHILS 0.1 0.0 - 0.1 K/UL    ABS. IMM. GRANS. 0.2 (H) 0.00 - 0.04 K/UL    DF AUTOMATED     METABOLIC PANEL, COMPREHENSIVE    Collection Time: 03/19/22  7:24 PM   Result Value Ref Range    Sodium 135 (L) 136 - 145 mmol/L    Potassium 3.6 3.5 - 5.5 mmol/L    Chloride 101 100 - 111 mmol/L    CO2 20 (L) 21 - 32 mmol/L    Anion gap 14 3.0 - 18 mmol/L    Glucose 115 (H) 74 - 99 mg/dL    BUN 43 (H) 7.0 - 18 MG/DL    Creatinine 1.52 (H) 0.6 - 1.3 MG/DL    BUN/Creatinine ratio 28 (H) 12 - 20      GFR est AA 54 (L) >60 ml/min/1.73m2    GFR est non-AA 45 (L) >60 ml/min/1.73m2    Calcium 8.7 8.5 - 10.1 MG/DL    Bilirubin, total 0.5 0.2 - 1.0 MG/DL    ALT (SGPT) 13 (L) 16 - 61 U/L    AST (SGOT) 7 (L) 10 - 38 U/L    Alk.  phosphatase 172 (H) 45 - 117 U/L    Protein, total 6.4 6.4 - 8.2 g/dL    Albumin 2.6 (L) 3.4 - 5.0 g/dL    Globulin 3.8 2.0 - 4.0 g/dL    A-G Ratio 0.7 (L) 0.8 - 1.7     NT-PRO BNP    Collection Time: 03/19/22  7:24 PM   Result Value Ref Range    NT pro- 0 - 1,800 PG/ML   TROPONIN-HIGH SENSITIVITY    Collection Time: 03/19/22  7:24 PM   Result Value Ref Range    Troponin-High Sensitivity 4 0 - 78 ng/L   LACTIC ACID    Collection Time: 03/19/22  9:03 PM   Result Value Ref Range    Lactic acid 0.6 0.4 - 2.0 MMOL/L Radiologic Studies -   [unfilled]  CT Results  (Last 48 hours)    None        CXR Results  (Last 48 hours)               03/19/22 2006  XR CHEST PORT Final result    Impression:  Stable interstitial infiltrate at the right lung base with moderate   to severe emphysema and pleural calcifications on the right. Narrative:  EXAM:  AP Portable Chest X-ray 1 view        INDICATION: Shortness of breath       COMPARISON: February 3, 2022       _______________       FINDINGS:  Heart and mediastinal contours are within normal limits for portable   radiograph. Stable interstitial infiltrate right lung base. Moderate to severe   emphysema. There are no pleural effusions. There are pleural calcifications seen   at the right lung base. No acute osseous findings. ________________                     Medical Decision Making and ED Course   - I am the first and primary provider for this patient AND AM THE PRIMARY PROVIDER OF RECORD. - I reviewed the vital signs, available nursing notes, past medical history, past surgical history, family history and social history. - Initial assessment performed. The patients presenting problems have been discussed, and the staff are in agreement with the care plan formulated and outlined with them. I have encouraged them to ask questions as they arise throughout their visit. Vital Signs-Reviewed the patient's vital signs. Patient Vitals for the past 12 hrs:   Temp Pulse Resp BP SpO2   03/19/22 2130 98.6 °F (37 °C) 93 27 (!) 127/53 98 %   03/19/22 2030 -- 95 26 (!) 118/54 99 %   03/19/22 1945 -- (!) 101 28 (!) 122/59 100 %   03/19/22 1916 98.8 °F (37.1 °C) 99 30 138/61 100 %       EKG interpretation: (Preliminary): Performed at 19:16, and read at 19:16  Rhythm: sinus tachycardia; and regular . Rate (approx.): 103  ;  Axis: normal; IN interval: normal; QRS interval: normal ; ST/T wave: normal; Other findings: .      Records Reviewed: Nursing Notes, Old Medical Records, Previous Radiology Studies and Previous Laboratory Studies    The patient presents with SOB with a differential diagnosis of COPD exacerbation, CHF, pneumothorax, pleural effusions, pneumonia, medication reaction, electrolyte abnormalities. ED Course:   Patient with a history of COPD and O2 dependent at home. Admits and has been using water to nebulize himself. Presents with progressively worsening shortness of breath and wheezing. EMS reported temperature 100.4 at home. He is tachypneic at arrival and wheezing. Pulse ox is 100% on room air and on his 2L O2. He has leukocytosis, chest x-ray shows chronic infiltrates right middle lobe, CTA done a month ago. Is likely loculated infiltrate. Improved somewhat with nebulizer treatment but remains tachypneic. Given his low-grade temp, leukocytosis, and a chronic right middle lobe infiltrate, there is suspicion of possible pneumonia, blood cultures were done in ED, antibiotics started in ED, discussed patient with hospitalist who will admit. Provider Notes (Medical Decision Making):         Consultations:       Consultations:         Procedures and Critical Care                     Disposition: Admitted to telemetry the case was discussed with the admitting physician Dr. Brian Kruger    Diagnosis:   1. Acute exacerbation of chronic obstructive pulmonary disease (COPD) ([facility])                  Diagnosis     Clinical Impression:   1. Acute exacerbation of chronic obstructive pulmonary disease (COPD) ([facility]        Attestations:    Jac Thrasher MD    Please note that this dictation was completed with [employer], the computer voice recognition software. Quite often unanticipated grammatical, syntax, homophones, and other interpretive errors are inadvertently transcribed by the computer software. Please disregard these errors. Please excuse any errors that have escaped final proofreading. Thank you.

## 2022-03-20 PROBLEM — J44.1 COPD WITH ACUTE EXACERBATION (HCC): Status: ACTIVE | Noted: 2021-09-30

## 2022-03-20 PROBLEM — J44.1 ACUTE EXACERBATION OF CHRONIC OBSTRUCTIVE PULMONARY DISEASE (COPD) (HCC): Status: ACTIVE | Noted: 2021-10-05

## 2022-03-20 PROBLEM — R54 AGE-RELATED PHYSICAL DEBILITY: Status: ACTIVE | Noted: 2019-07-08

## 2022-03-20 LAB
ALBUMIN SERPL-MCNC: 2.2 G/DL (ref 3.4–5)
ALBUMIN/GLOB SERPL: 0.5 {RATIO} (ref 0.8–1.7)
ALP SERPL-CCNC: 155 U/L (ref 45–117)
ALT SERPL-CCNC: 10 U/L (ref 16–61)
ANION GAP SERPL CALC-SCNC: 10 MMOL/L (ref 3–18)
AST SERPL-CCNC: 10 U/L (ref 10–38)
BASOPHILS # BLD: 0 K/UL (ref 0–0.1)
BASOPHILS NFR BLD: 0 % (ref 0–2)
BILIRUB SERPL-MCNC: 0.3 MG/DL (ref 0.2–1)
BUN SERPL-MCNC: 43 MG/DL (ref 7–18)
BUN/CREAT SERPL: 28 (ref 12–20)
CALCIUM SERPL-MCNC: 8.9 MG/DL (ref 8.5–10.1)
CHLORIDE SERPL-SCNC: 103 MMOL/L (ref 100–111)
CO2 SERPL-SCNC: 19 MMOL/L (ref 21–32)
CREAT SERPL-MCNC: 1.54 MG/DL (ref 0.6–1.3)
DIFFERENTIAL METHOD BLD: ABNORMAL
EOSINOPHIL # BLD: 0 K/UL (ref 0–0.4)
EOSINOPHIL NFR BLD: 0 % (ref 0–5)
ERYTHROCYTE [DISTWIDTH] IN BLOOD BY AUTOMATED COUNT: 14.6 % (ref 11.6–14.5)
GLOBULIN SER CALC-MCNC: 4.8 G/DL (ref 2–4)
GLUCOSE SERPL-MCNC: 163 MG/DL (ref 74–99)
HCT VFR BLD AUTO: 29.9 % (ref 36–48)
HGB BLD-MCNC: 9.5 G/DL (ref 13–16)
IMM GRANULOCYTES # BLD AUTO: 0.2 K/UL (ref 0–0.04)
IMM GRANULOCYTES NFR BLD AUTO: 1 % (ref 0–0.5)
LYMPHOCYTES # BLD: 0.2 K/UL (ref 0.9–3.6)
LYMPHOCYTES NFR BLD: 1 % (ref 21–52)
MAGNESIUM SERPL-MCNC: 2.2 MG/DL (ref 1.6–2.6)
MCH RBC QN AUTO: 27.5 PG (ref 24–34)
MCHC RBC AUTO-ENTMCNC: 31.8 G/DL (ref 31–37)
MCV RBC AUTO: 86.4 FL (ref 78–100)
MONOCYTES # BLD: 0.1 K/UL (ref 0.05–1.2)
MONOCYTES NFR BLD: 1 % (ref 3–10)
NEUTS SEG # BLD: 15.2 K/UL (ref 1.8–8)
NEUTS SEG NFR BLD: 97 % (ref 40–73)
NRBC # BLD: 0 K/UL (ref 0–0.01)
NRBC BLD-RTO: 0 PER 100 WBC
PLATELET # BLD AUTO: 403 K/UL (ref 135–420)
PMV BLD AUTO: 9.5 FL (ref 9.2–11.8)
POTASSIUM SERPL-SCNC: 4.1 MMOL/L (ref 3.5–5.5)
PROT SERPL-MCNC: 7 G/DL (ref 6.4–8.2)
RBC # BLD AUTO: 3.46 M/UL (ref 4.35–5.65)
SODIUM SERPL-SCNC: 132 MMOL/L (ref 136–145)
TROPONIN-HIGH SENSITIVITY: <3 NG/L (ref 0–78)
WBC # BLD AUTO: 15.6 K/UL (ref 4.6–13.2)

## 2022-03-20 PROCEDURE — 85025 COMPLETE CBC W/AUTO DIFF WBC: CPT

## 2022-03-20 PROCEDURE — 74011000258 HC RX REV CODE- 258: Performed by: INTERNAL MEDICINE

## 2022-03-20 PROCEDURE — 94760 N-INVAS EAR/PLS OXIMETRY 1: CPT

## 2022-03-20 PROCEDURE — 77010033678 HC OXYGEN DAILY

## 2022-03-20 PROCEDURE — 80053 COMPREHEN METABOLIC PANEL: CPT

## 2022-03-20 PROCEDURE — C9113 INJ PANTOPRAZOLE SODIUM, VIA: HCPCS | Performed by: INTERNAL MEDICINE

## 2022-03-20 PROCEDURE — 94640 AIRWAY INHALATION TREATMENT: CPT

## 2022-03-20 PROCEDURE — 83735 ASSAY OF MAGNESIUM: CPT

## 2022-03-20 PROCEDURE — 74011000250 HC RX REV CODE- 250: Performed by: INTERNAL MEDICINE

## 2022-03-20 PROCEDURE — 74011250636 HC RX REV CODE- 250/636: Performed by: INTERNAL MEDICINE

## 2022-03-20 PROCEDURE — 84484 ASSAY OF TROPONIN QUANT: CPT

## 2022-03-20 PROCEDURE — 97161 PT EVAL LOW COMPLEX 20 MIN: CPT

## 2022-03-20 PROCEDURE — 65660000000 HC RM CCU STEPDOWN

## 2022-03-20 PROCEDURE — 74011250637 HC RX REV CODE- 250/637: Performed by: INTERNAL MEDICINE

## 2022-03-20 PROCEDURE — 36415 COLL VENOUS BLD VENIPUNCTURE: CPT

## 2022-03-20 PROCEDURE — 97165 OT EVAL LOW COMPLEX 30 MIN: CPT

## 2022-03-20 RX ADMIN — IPRATROPIUM BROMIDE AND ALBUTEROL SULFATE 3 ML: .5; 3 SOLUTION RESPIRATORY (INHALATION) at 23:43

## 2022-03-20 RX ADMIN — SODIUM CHLORIDE, PRESERVATIVE FREE 40 MG: 5 INJECTION INTRAVENOUS at 08:41

## 2022-03-20 RX ADMIN — FUROSEMIDE 20 MG: 20 TABLET ORAL at 08:41

## 2022-03-20 RX ADMIN — APIXABAN 5 MG: 5 TABLET, FILM COATED ORAL at 08:41

## 2022-03-20 RX ADMIN — ARFORMOTEROL TARTRATE 15 MCG: 15 SOLUTION RESPIRATORY (INHALATION) at 20:08

## 2022-03-20 RX ADMIN — FERROUS SULFATE TAB 325 MG (65 MG ELEMENTAL FE) 325 MG: 325 (65 FE) TAB at 17:28

## 2022-03-20 RX ADMIN — METHYLPREDNISOLONE SODIUM SUCCINATE 60 MG: 40 INJECTION, POWDER, FOR SOLUTION INTRAMUSCULAR; INTRAVENOUS at 17:28

## 2022-03-20 RX ADMIN — FAMOTIDINE 20 MG: 20 TABLET ORAL at 08:41

## 2022-03-20 RX ADMIN — IPRATROPIUM BROMIDE AND ALBUTEROL SULFATE 3 ML: .5; 3 SOLUTION RESPIRATORY (INHALATION) at 15:00

## 2022-03-20 RX ADMIN — AMLODIPINE BESYLATE 5 MG: 5 TABLET ORAL at 08:41

## 2022-03-20 RX ADMIN — ARFORMOTEROL TARTRATE 15 MCG: 15 SOLUTION RESPIRATORY (INHALATION) at 07:13

## 2022-03-20 RX ADMIN — FERROUS SULFATE TAB 325 MG (65 MG ELEMENTAL FE) 325 MG: 325 (65 FE) TAB at 08:41

## 2022-03-20 RX ADMIN — IPRATROPIUM BROMIDE AND ALBUTEROL SULFATE 3 ML: .5; 3 SOLUTION RESPIRATORY (INHALATION) at 03:30

## 2022-03-20 RX ADMIN — METHYLPREDNISOLONE SODIUM SUCCINATE 60 MG: 40 INJECTION, POWDER, FOR SOLUTION INTRAMUSCULAR; INTRAVENOUS at 05:32

## 2022-03-20 RX ADMIN — APIXABAN 5 MG: 5 TABLET, FILM COATED ORAL at 21:01

## 2022-03-20 RX ADMIN — SODIUM CHLORIDE, PRESERVATIVE FREE 5 ML: 5 INJECTION INTRAVENOUS at 14:00

## 2022-03-20 RX ADMIN — GABAPENTIN 100 MG: 100 CAPSULE ORAL at 21:01

## 2022-03-20 RX ADMIN — METHYLPREDNISOLONE SODIUM SUCCINATE 60 MG: 40 INJECTION, POWDER, FOR SOLUTION INTRAMUSCULAR; INTRAVENOUS at 23:19

## 2022-03-20 RX ADMIN — GABAPENTIN 100 MG: 100 CAPSULE ORAL at 08:41

## 2022-03-20 RX ADMIN — METHYLPREDNISOLONE SODIUM SUCCINATE 60 MG: 40 INJECTION, POWDER, FOR SOLUTION INTRAMUSCULAR; INTRAVENOUS at 12:01

## 2022-03-20 RX ADMIN — IPRATROPIUM BROMIDE AND ALBUTEROL SULFATE 3 ML: .5; 3 SOLUTION RESPIRATORY (INHALATION) at 20:08

## 2022-03-20 RX ADMIN — PIPERACILLIN AND TAZOBACTAM 3.38 G: 3; .375 INJECTION, POWDER, LYOPHILIZED, FOR SOLUTION INTRAVENOUS at 03:30

## 2022-03-20 RX ADMIN — SODIUM CHLORIDE, PRESERVATIVE FREE 10 ML: 5 INJECTION INTRAVENOUS at 05:32

## 2022-03-20 RX ADMIN — PIPERACILLIN AND TAZOBACTAM 3.38 G: 3; .375 INJECTION, POWDER, LYOPHILIZED, FOR SOLUTION INTRAVENOUS at 14:53

## 2022-03-20 RX ADMIN — BUDESONIDE 500 MCG: 0.5 INHALANT RESPIRATORY (INHALATION) at 20:08

## 2022-03-20 RX ADMIN — SODIUM CHLORIDE, PRESERVATIVE FREE 10 ML: 5 INJECTION INTRAVENOUS at 21:00

## 2022-03-20 RX ADMIN — GABAPENTIN 100 MG: 100 CAPSULE ORAL at 17:28

## 2022-03-20 RX ADMIN — PIPERACILLIN AND TAZOBACTAM 3.38 G: 3; .375 INJECTION, POWDER, LYOPHILIZED, FOR SOLUTION INTRAVENOUS at 08:41

## 2022-03-20 RX ADMIN — IPRATROPIUM BROMIDE AND ALBUTEROL SULFATE 3 ML: .5; 3 SOLUTION RESPIRATORY (INHALATION) at 07:13

## 2022-03-20 RX ADMIN — IPRATROPIUM BROMIDE AND ALBUTEROL SULFATE 3 ML: .5; 3 SOLUTION RESPIRATORY (INHALATION) at 11:12

## 2022-03-20 RX ADMIN — PIPERACILLIN AND TAZOBACTAM 3.38 G: 3; .375 INJECTION, POWDER, LYOPHILIZED, FOR SOLUTION INTRAVENOUS at 21:01

## 2022-03-20 RX ADMIN — SODIUM CHLORIDE, PRESERVATIVE FREE 40 MG: 5 INJECTION INTRAVENOUS at 20:55

## 2022-03-20 RX ADMIN — TAMSULOSIN HYDROCHLORIDE 0.4 MG: 0.4 CAPSULE ORAL at 17:28

## 2022-03-20 RX ADMIN — BUDESONIDE 500 MCG: 0.5 INHALANT RESPIRATORY (INHALATION) at 07:13

## 2022-03-20 NOTE — PROGRESS NOTES
Reason for Admission:   Shortness of Breath                 RUR Score:  High; 26%         PCP: First and Last name:  Nano Chow MD     Name of Practice:   Are you a current patient: Yes/No:   Approximate date of last visit:    Can you do a virtual visit with your PCP:              Resources/supports as identified by patient/family:                   Top Challenges facing patient (as identified by patient/family and CM): Finances/Medication cost?                    Transportation? Support system or lack thereof? Living arrangements? Self-care/ADLs/Cognition? Current Advanced Directive/Advance Care Plan:  Full Code      Healthcare Decision Maker:   Click here to complete HealthCare Decision Makers including selection of the Healthcare Decision Maker Relationship (ie \"Primary\")      Primary Decision MakerEarbriandarryl Shaw - [phone]    Secondary Decision Maker: Judah Mojica daughter - [phone]    Payor Source Payor: Otf Banerjee / Plan: [place] / Product Type: Medicare /                             Plan for utilizing home health:   TBD                  Transition of Care Plan:    formerly [facility] with physician follow up vs SNF to LTC              Chart reviewed. Per H&P \"Dre Salmeron is a 64 y.o.  male who has history of COPD, on home oxygen 2 L, prostate cancer, CHF, chronic kidney disease stage II, hard of hearing and paroxysmal atrial fibrillation on Eliquis presents to the emergency room via ambulance with complaints of fever of 100.4 and increasing cough with dyspnea.   Patient apparently ran out of his medication and has been using water and his nebulizing machine patient has been able to ambulate without any difficulties all his wounds have healed he currently denies any chest pain and feels better since getting his steroids and nebulizing treatments that were given in the emergency room he denies any chest pain palpitations main concern is worsening dyspnea and cough after running out of medications. Patient has already had COVID-19 and the vaccine. \"    CM spoke with pt's wife, Jennifer Murillo ([phone]), to discuss transition of care. Pt's wife has been caring for this pt at home with limited assistance. She is agreeable to SNF. CM offered [facility] and she is agreeable to having clinical information sent to facilities in [facility] and [place]. CM discussed applying for Medicaid and pt's wife would like to think about it. CM notified family that all of pt's income, less $40.00, would have to go to the facility if pt qualifies for Medicaid. Pt has had a LTSS/UAI screening completed in the past.  CM will follow up with family to readdress applying for medicaid. If family is agreeable [employer] will be contacted to assist.  Anticipate pt will transition to SNF with likely transition to LTC vs returning home with [facility].       Care Management Interventions  Mode of Transport at Discharge: S  Transition of Care Consult (CM Consult): Discharge Planning  Health Maintenance Reviewed: Yes  Physical Therapy Consult: Yes  Occupational Therapy Consult: Yes  Support Systems: Spouse/Significant Other,Child(prashant)  The Plan for Transition of Care is Related to the Following Treatment Goals : Home with [facility] and physician follow up vs SNF  The Patient and/or Patient Representative was Provided with a Choice of Provider and Agrees with the Discharge Plan?: Yes  Freedom of Choice List was Provided with Basic Dialogue that Supports the Patient's Individualized Plan of Care/Goals, Treatment Preferences and Shares the Quality Data Associated with the Providers?: Yes  Discharge Location  Patient Expects to be Discharged to[de-identified] Skilled nursing facility (vs [facility])

## 2022-03-20 NOTE — PROGRESS NOTES
0700 Assumed care of pt from [address]. Completed assessment of pt. No complaints of distress/discomfort. Pt sitting on side of bed eating breakfast. Call bell within reach.

## 2022-03-20 NOTE — PROGRESS NOTES
Patient has non blanchable area to sacral area, treatment in progress.     Skin warm, dry, and flaky

## 2022-03-20 NOTE — PROGRESS NOTES
PHYSICAL THERAPY EVALUATION AND DISCHARGE    Patient: Lucy Fofana (27 y.o. male)  Date: 3/20/2022  Primary Diagnosis: COPD with acute exacerbation ([facility]) [J44.1]        Precautions:   Fall     PLOF: Pt reports grossly mod I in self care, able to dress, bathe himself, using walker or cane for ambulation depending on day, lives at home with spouse and grand daughter, on portable o2 at home 2 liters    ASSESSMENT :  Pt moves quickly and cued to slow down for safety. Pt educated on home safety, energy conservation, PLB, assistive device use, fall prevention. Pt ambulates with RW, SBA for 80'. Declines further gait. States he feels confident with mobility. Pt states he does not want HHPT since he has had it before and he knows all the exercises. Patient does not require further skilled intervention at this level of care. PLAN :  Recommendations and Planned Interventions:   No formal PT needs identified at this time. Discharge Recommendations: None  Further Equipment Recommendations for Discharge: pt reports having all DME at home     SUBJECTIVE:   Patient stated I am good. I want to take a nap.     OBJECTIVE DATA SUMMARY:     Past Medical History:   Diagnosis Date    Brain aneurysm     Brain aneurysm     Cancer ([facility]     prostate    CHF (congestive heart failure) ([facility])     CKD (chronic kidney disease)     Closed nondisplaced supracondylar fracture of lower end of left femur without intracondylar extension with delayed healing     COPD (chronic obstructive pulmonary disease) ([address]     Debility     History of home oxygen therapy     [place] (hard of hearing)     Hypertension     Nocturia     On home oxygen therapy     PAF (paroxysmal atrial fibrillation) ([address]     Pneumonia     Prostate CA ([facility]     Prostate neoplasm     Radiation effect      Past Surgical History:   Procedure Laterality Date    HX HERNIA REPAIR      HX HIP ARTHROSCOPY  04/09/2021     Barriers to Learning/Limitations: yes; [place]  Compensate with: Visual Cues  Home Situation:   Home Situation  Home Environment: Private residence  # Steps to Enter: 5  Rails to Enter: Yes  Hand Rails : Right  One/Two Story Residence: One story  Living Alone: No  Support Systems: Spouse/Significant Other  Patient Expects to be Discharged to[de-identified] Skilled nursing facility (vs )  Current DME Used/Available at Home: Elnoria Leno, rolling,Cane, straight,Oxygen, portable  Tub or Shower Type: Tub/Shower combination  Critical Behavior:  Neurologic State: Alert  Orientation Level: Oriented X4  Cognition: Follows commands  Safety/Judgement: Fall prevention;Home safety  Psychosocial  Patient Behaviors: Calm; Cooperative  Purposeful Interaction: Yes  Pt Identified Daily Priority: Clinical issues (comment); Communication issues (comment); Social issues (comment); Family issues (comment); Spiritual (comment)  Caritas Process: Nurture loving kindness; Teaching/learning  Caring Interventions: Reassure  Therapeutic Modalities: Humor  Strength:    Strength: Within functional limits  Tone & Sensation:   Tone: Normal  Sensation: Intact  Range Of Motion:  AROM: Generally decreased, functional  Functional Mobility:  Bed Mobility:  Supine to Sit: Supervision  Sit to Supine: Supervision  Scooting: Supervision  Transfers:  Sit to Stand: Supervision  Stand to Sit: Supervision  Bed to Chair: Contact guard assistance  Balance:   Sitting: Intact  Standing: Intact; With support  Ambulation/Gait Training:  Distance (ft): 80 Feet (ft)  Assistive Device: Walker, rolling;Gait belt  Ambulation - Level of Assistance: Stand-by assistance  Gait Abnormalities: Decreased step clearance  Base of Support: Narrowed  Stance: Left increased;Right increased;Time  Speed/[place]isbet: Accelerated  Interventions: Verbal cues; Tactile cues; Safety awareness training    Therapeutic Exercises:   PLB  Pain:  Pain level pre-treatment: 0/10   Pain level post-treatment: 0/10    Activity Tolerance:   Good  Please refer to the flowsheet for vital signs taken during this treatment. After treatment:   []         Patient left in no apparent distress sitting up in chair  [x]         Patient left in no apparent distress in bed  [x]         Call bell left within reach  [x]         Nursing notified  []         Caregiver present  []         Bed alarm activated  []         SCDs applied    COMMUNICATION/EDUCATION:   [x]         Role of Physical Therapy in the acute care setting. [x]         Fall prevention education was provided and the patient/caregiver indicated understanding. [x]         Patient/family have participated as able in goal setting and plan of care. [x]         Patient/family agree to work toward stated goals and plan of care. []         Patient understands intent and goals of therapy, but is neutral about his/her participation. []         Patient is unable to participate in goal setting/plan of care: ongoing with therapy staff.  []         Other:     Thank you for this referral.  Kerry Rivas, PT   Time Calculation: 10 mins      Eval Complexity: History: LOW Complexity : Zero comorbidities / personal factors that will impact the outcome / POCExam:LOW Complexity : 1-2 Standardized tests and measures addressing body structure, function, activity limitation and / or participation in recreation  Presentation: LOW Complexity : Stable, uncomplicated  Clinical Decision Making:Low Complexity   Overall Complexity:LOW

## 2022-03-20 NOTE — H&P
History & Physical    Patient: Ted Severino MRN: [id number]  CSN: [id number]    YOB: 1945  Age: 64 y.o. Sex: male      DOA: 3/19/2022    Chief Complaint:   Chief Complaint   Patient presents with    Shortness of Breath          HPI:     Ted Severino is a 64 y.o.  male who has history of COPD, on home oxygen 2 L, prostate cancer, CHF, chronic kidney disease stage II, hard of hearing and paroxysmal atrial fibrillation on Eliquis presents to the emergency room via ambulance with complaints of fever of 100.4 and increasing cough with dyspnea. Patient apparently ran out of his medication and has been using water and his nebulizing machine patient has been able to ambulate without any difficulties all his wounds have healed he currently denies any chest pain and feels better since getting his steroids and nebulizing treatments that were given in the emergency room he denies any chest pain palpitations main concern is worsening dyspnea and cough after running out of medications.   Patient has already had COVID-19 and the vaccine    Past Medical History:   Diagnosis Date    Brain aneurysm     Brain aneurysm     Cancer ([address]     prostate    CHF (congestive heart failure) (HCC)     CKD (chronic kidney disease)     Closed nondisplaced supracondylar fracture of lower end of left femur without intracondylar extension with delayed healing     COPD (chronic obstructive pulmonary disease) ([address]     Debility     History of home oxygen therapy     [place] (hard of hearing)     Hypertension     Nocturia     On home oxygen therapy     PAF (paroxysmal atrial fibrillation) (HCC)     Pneumonia     Prostate CA ([address]     Prostate neoplasm     Radiation effect        Past Surgical History:   Procedure Laterality Date    HX HERNIA REPAIR      HX HIP ARTHROSCOPY  04/09/2021       Family History   Problem Relation Age of Onset    Heart Disease Mother        Social History     Socioeconomic History    Marital status:    Tobacco Use    Smoking status: Former Smoker     Types: Cigarettes    Smokeless tobacco: Never Used   Substance and Sexual Activity    Alcohol use: No    Drug use: Never       Prior to Admission medications    Medication Sig Start Date End Date Taking? Authorizing Provider   predniSONE (DELTASONE) 20 mg tablet Take 20 mg by mouth daily (with lunch). 2/6/22   Shen Shah MD   chlorthalidone (HYGROTON) 25 mg tablet Take 25 mg by mouth daily. Provider, Historical   traMADoL (ULTRAM) 50 mg tablet Take 50 mg by mouth every eight (8) hours as needed for Pain. Provider, Historical   gabapentin (NEURONTIN) 100 mg capsule Take 100 mg by mouth three (3) times daily. Provider, Historical   apixaban (ELIQUIS) 5 mg tablet Take 1 Tablet by mouth two (2) times a day. 10/7/21   Sierra Birmingham MD   collagenase (SANTYL) 250 unit/gram ointment Apply  to affected area daily. 6/23/21   Sierra Birmingham MD   arformoteroL (BROVANA) 15 mcg/2 mL nebu neb solution 2 mL by Nebulization route two (2) times a day. 4/14/21   Sola Rushing MD   budesonide (PULMICORT) 0.5 mg/2 mL nbsp 2 mL by Nebulization route two (2) times a day. 4/14/21   Sola Rushing MD   famotidine (PEPCID) 20 mg tablet Take 1 Tab by mouth daily. 4/14/21   Sola Rushing MD   ferrous sulfate 325 mg (65 mg iron) tablet Take 1 Tab by mouth two (2) times daily (with meals). 4/14/21   Sola Rushing MD   amLODIPine (NORVASC) 5 mg tablet Take 1 Tab by mouth daily. 3/3/21   Pilo Zapata MD   albuterol-ipratropium (DUO-NEB) 2.5 mg-0.5 mg/3 ml nebu 3 mL by Nebulization route every four (4) hours as needed for Wheezing. Patient not taking: Reported on 10/9/2021 2/7/21   Nicholas Jordan MD   albuterol (PROVENTIL HFA, VENTOLIN HFA, PROAIR HFA) 90 mcg/actuation inhaler Take 2 Puffs by inhalation every four (4) hours as needed for Wheezing or Shortness of Breath.  2/7/21   Nicholas Jordan MD   OXYGEN-AIR DELIVERY SYSTEMS 2 L/min by Nasal route continuous. Provider, Historical   furosemide (Lasix) 20 mg tablet Take 20 mg by mouth daily. Provider, Historical   dextran 70/hypromellose (ARTIFICIAL TEARS, PF, OP) Administer 2 Drops to both eyes daily as needed for Other (dry eyes). Provider, Historical   tamsulosin (FLOMAX) 0.4 mg capsule Take 0.4 mg by mouth every evening. Other, MD Blayne       Allergies   Allergen Reactions    Aspirin Other (comments)     \"messes my stomach up\"    Bactrim [Sulfamethoxazole-Trimethoprim] Unknown (comments)         Review of Systems review of system is somewhat limited as patient is hard of hearing  GENERAL: Patient alert, awake and oriented times 3, able to communicate full sentences and not in distress. HEENT: No change in vision, no earache, tinnitus, sore throat or sinus congestion. NECK: No pain or stiffness. PULMONARY: + shortness of breath,+ cough or wheeze. Cardiovascular: no pnd or orthopnea, no CP  GASTROINTESTINAL: No abdominal pain, nausea, vomiting or diarrhea, melena or bright red blood per rectum. GENITOURINARY: No urinary frequency, urgency, hesitancy or dysuria. MUSCULOSKELETAL: + joint or muscle pain, no back pain, no recent trauma. DERMATOLOGIC: No rash, no itching, no lesions. ENDOCRINE: No polyuria, polydipsia, no heat or cold intolerance. No recent change in weight. HEMATOLOGICAL: No anemia or easy bruising or bleeding. NEUROLOGIC: No headache, seizures, numbness, tingling +r weakness. Physical Exam:     Physical Exam:  Visit Vitals  BP (!) 127/53   Pulse 93   Temp 98.6 °F (37 °C)   Resp 27   Ht 5' 8\" (1.727 m)   Wt 71.2 kg (157 lb)   SpO2 98%   BMI 23.87 kg/m²    O2 Flow Rate (L/min): 2 l/min O2 Device: Nasal cannula    Temp (24hrs), Av.7 °F (37.1 °C), Min:98.6 °F (37 °C), Max:98.8 °F (37.1 °C)    No intake/output data recorded. No intake/output data recorded. General:  Alert, cooperative, no distress, appears stated age.   Patient is extremely hard of hearing which makes asking questions difficult              Head: Normocephalic, without obvious abnormality, atraumatic. Eyes:  Conjunctivae/corneas clear. PERRL, EOMs intact. Nose: Nares normal. No drainage or sinus tenderness. Neck: Supple, symmetrical, trachea midline, no adenopathy, thyroid: no enlargement, no carotid bruit and no JVD. Lungs:   Clear to auscultation bilaterally. Occasional expiratory wheeze   Heart:  Regular rate and rhythm, S1, S2 normal.  Mild tachycardia     Abdomen: Soft, non-tender. Bowel sounds normal.    Extremities: Extremities normal, atraumatic, no cyanosis or edema. Pulses: 2+ and symmetric all extremities. Skin:  No rashes or lesions left hip lesion has closed as well as ankle lesions I see no open wounds   Neurologic: AAOx3, No focal motor or sensory deficit. Labs Reviewed: All lab results for the last 24 hours reviewed. and EKG    Procedures/imaging: see electronic medical records for all procedures/Xrays and details which were not copied into this note but were reviewed prior to creation of Plan      Assessment/Plan     Principal Problem:  1. COPD with acute exacerbation ([facility] [address] (9/30/2021)  He is started on Brovana and Pulmicort nebulizers  As well as albuterol 4 times daily and every 2 as needed  I started him on IV Solu-Medrol which can be tapered over the next few days  2. Chronic loculated right pleural effusion  Started on IV Zosyn  We will check urine Legionella and streptococcal antigen as well as rapid flu  He is continued on his Lasix  3. Paroxysmal atrial fib last noticed in April of last year  I do not anticipate any procedures so his Eliquis is continued  4. Chronic kidney disease  Avoid nephrotoxic agents trend BMP  Dose medications accordingly monitor for signs of urinary retention  Post void residual every shift strict I's and O's  5. Hard of hearing  With early dementia PT and OT consult  6.   History of CHF currently compensated check cardiac enzymes continue cardiac medications fluid and salt restriction  No repeat echo ordered as his last echo was February 3  DVT/GI Prophylaxis: Avelina Ricks MD  3/19/2022 9:38 PM

## 2022-03-20 NOTE — ROUTINE PROCESS
Bedside and Verbal shift change report given to CAITLIN Faria (oncoming nurse) by Sin Diaz RN (offgoing nurse). Report included the following information SBAR, Kardex, MAR and Recent Results.

## 2022-03-20 NOTE — PROGRESS NOTES
OCCUPATIONAL THERAPY EVALUATION/DISCHARGE    Problem: Self Care Deficits Care Plan (Adult)  Goal: *Acute Goals and Plan of Care (Insert Text)  Outcome: Progressing Towards Goal  Note:   FUNCTIONAL STATUS PRIOR TO ADMISSION: Patient was modified independent using a walker for functional mobility. HOME SUPPORT: The patient lived alone with spouse to provide assistance. Initial Occupational Therapy Goals (3/20/2022) Within 7 day(s):  1. Patient will perform perform grooming standing sink level with walker mod I for 5 minutes for increased independence in ADLs. 2. Patient will perform UB dressing with I seated EOB for increased independence with ADLs. 3. Patient will perform LB dressing with mod I & A/E PRN for increased independence with ADLs. 4. Patient will perform all aspects of toileting with mod I for increased independence with ADLs. 5. Patient will perform LE ADLs utilizing body mechanics & adaptive strategies with 1 verbal cue for increased safety in ADLs. 6. Patient will independently apply energy conservation techniques with no verbal cue(s) for increased independence with ADLs. Patient: Casey Day (70 y.o. male)  Date: 3/20/2022  Primary Diagnosis: COPD with acute exacerbation ([address] [J44.1]        Precautions:   Fall  PLOF: Pt reports grossly mod I in self care, able to dress, bathe himself, using walker or cane for ambulation depending on day, lives at home with spouse and grand daughter, on portable o2 at home 2 liters    ASSESSMENT AND RECOMMENDATIONS:  Based on the objective data described below, the patient presents with generalized decreased activity tolerance for ADLs. Pt presents supine in bed, agreeable to get up. Pt is able to perform LB dressing with CGA, cues for safety when standing with wlaker but able to perform standing dressing with no LOB noted. Pt able to ambulate to bathroom with FWW, perform toilet and shower transfer with no LOB.  Pt able to perfomr standing reaching activities across midline with no LOB. No SOB noted, pt on 2L through session, maintains 98% o2. Pt left sitting upright with all needs in reach. Skilled OT not indicated at this time. . Provided opportunity for patient to voice questions/concerns on ADL performance when home, patient voiced no further concerns. Education: Pt educated on role of OT in acute setting    Skilled Occupational Therapy is not indicated at this time. Discharge Recommendations: Home Health  Further Equipment Recommendations for Discharge: None      SUBJECTIVE:   Patient stated I can go to the bathroom myself.     OBJECTIVE DATA SUMMARY:     Past Medical History:   Diagnosis Date    Brain aneurysm     Brain aneurysm     Cancer (HCC)     prostate    CHF (congestive heart failure) (HCC)     CKD (chronic kidney disease)     Closed nondisplaced supracondylar fracture of lower end of left femur without intracondylar extension with delayed healing     COPD (chronic obstructive pulmonary disease) ([facility] [address]     Debility     History of home oxygen therapy     [place] (hard of hearing)     Hypertension     Nocturia     On home oxygen therapy     PAF (paroxysmal atrial fibrillation) (HCC)     Pneumonia     Prostate CA ([facility] [address]     Prostate neoplasm     Radiation effect      Past Surgical History:   Procedure Laterality Date    HX HERNIA REPAIR      HX HIP ARTHROSCOPY  04/09/2021     Barriers to Learning/Limitations: None  Compensate with: visual, verbal, tactile, kinesthetic cues/model    Home Situation:   Home Situation  Home Environment: Private residence  # Steps to Enter: 5  Rails to Enter: Yes  One/Two Story Residence: One story  Living Alone: No  Support Systems: Spouse/Significant Other  Patient Expects to be Discharged to[de-identified] Skilled nursing facility (vs )  Current DME Used/Available at Home: Oxygen, portable,Walker, rolling,Cane, straight  Tub or Shower Type: Tub/Shower combination  [x]     Right hand dominant   []     Left hand dominant    Cognitive/Behavioral Status:  Neurologic State: Alert  Orientation Level: Oriented X4  Cognition: Follows commands  Safety/Judgement: Fall prevention    Skin: intact  Edema: none noted    Vision/Perceptual:    Tracking: Able to track stimulus in all quadrants w/o difficulty           Coordination: BUE  Coordination: Within functional limits  Fine Motor Skills-Upper: Left Intact; Right Intact    Gross Motor Skills-Upper: Left Intact; Right Intact    Balance:  Sitting: Intact  Standing: Intact; With support    Strength: BUE  Strength: Generally decreased, functional      Tone & Sensation: BUE  Tone: Normal  Sensation: Intact            Range of Motion: BUE  AROM: Generally decreased, functional          Functional Mobility and Transfers for ADLs:  Bed Mobility:     Supine to Sit: Stand-by assistance  Sit to Supine: Stand-by assistance  Scooting: Stand-by assistance  Transfers:  Sit to Stand: Contact guard assistance     Bed to Chair: Contact guard assistance          Toilet Transfer : Contact guard assistance       Shower Transfer: Contact guard assistance   Bathroom Mobility: Contact guard assistance    ADL Assessment:  Feeding: Supervision    Oral Facial Hygiene/Grooming: Stand-by assistance    Bathing: Contact guard assistance    Upper Body Dressing: Stand-by assistance    Lower Body Dressing: Contact guard assistance    Toileting: Contact guard assistance       ADL Intervention:           Lower Body Dressing Assistance  Dressing Assistance: Contact guard assistance  Underpants: Contact guard assistance  Pants With Elastic Waist: Contact guard assistance  Socks: Contact guard assistance  Position Performed: Standing;Seated edge of bed    Toileting  Toileting Assistance: Contact guard assistance  Bladder Hygiene: Contact guard assistance  Clothing Management: Contact guard assistance    Cognitive Retraining  Problem Solving: General alternative solution  Executive Functions: Managing time;Regulating behavior  Safety/Judgement: Fall prevention      Pain:  Pain level pre-treatment: 0/10   Pain level post-treatment: 0/10   Pain Intervention(s): Medication provided by Nursing (see MAR); Rest, Ice, Repositioning   Response to intervention: Nurse notified, See doc flow sheet    Activity Tolerance:   Good, no LOB or SOB noted during ADLs  Please refer to the flowsheet for vital signs taken during this treatment. After treatment:   []  Patient left in no apparent distress sitting up in chair  [x]  Patient left in no apparent distress in bed  [x]  Call bell left within reach  [x]  Nursing notified  []  Caregiver present  []  Bed alarm activated    COMMUNICATION/EDUCATION:   [x]      Role of Occupational Therapy in the acute care setting  [x]      Home safety education was provided and the patient/caregiver indicated understanding. [x]      Patient/family have participated as able and agree with findings and recommendations. []      Patient is unable to participate in plan of care at this time. Thank you for this referral.  Denilson Han OTD, OTR/L   Time Calculation: 14 mins      Eval Complexity: History: LOW Complexity : Brief history review ; Examination: LOW Complexity : 1-3 performance deficits relating to physical, cognitive , or psychosocial skils that result in activity limitations and / or participation restrictions ;    Decision Making:LOW Complexity : No comorbidities that affect functional and no verbal or physical assistance needed to complete eval tasks

## 2022-03-20 NOTE — ACP (ADVANCE CARE PLANNING)
Advance Care Planning     General Advance Care Planning (ACP) Conversation      Date of Conversation: 3/19/2022  Conducted with: Chart reviewed    Healthcare Decision Maker:     Primary Decision Maker: [address] - [phone]    Secondary Decision Maker:  Otilia Mattson - Daughter - [phone]  Click here to complete [employer] including selection of the Healthcare Decision Maker Relationship (ie \"Primary\")          Content/Action Overview:   Pt is a DNR  Reviewed DNR/DNI and patient: pt is a DNR and has an Advance Care Directive and a POST on file        Length of Voluntary ACP Conversation in minutes:  10 min  100 Shree Nicole

## 2022-03-20 NOTE — PROGRESS NOTES
Hospitalist Progress Note    Patient: Shell Quivers MRN: [id number]  CSN: [id number]    YOB: 1945  Age: 64 y.o. 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 On NC O2. COPD with acute exacerbation: on Brovana and Pulmicort nebulizers  As well as albuterol 4 times daily and every 2 as needed  IV Solu-Medrol which can be tapered over the next few days    Chronic loculated right pleural effusion: on IV Zosyn  We will check urine Legionella and streptococcal antigen  rapid flu: neg  On Lasix    Paroxysmal atrial fib last noticed in April of last year: rate controlled. on Eliquis.     Chronic kidney disease: Avoid nephrotoxic agents trend BMP  Dose medications accordingly monitor for signs of urinary retention  Post void residual every shift strict I's and O's    Hard of hearing With early dementia: high risks for fall    PT and OT consult    History of CHF currently compensated check cardiac enzymes continue cardiac medications fluid and salt restriction  No repeat echo ordered as his last echo was February 3    DVT/GI Prophylaxis: ELIQUIS    DNR/DNI    Continue inpatient hospitalization    Long discussion with Pt's wife/Vivian, Allyn Fulton @ [phone] [facility] course. We reviewed and discussed assessment of condition and went over plans of care. Questions answered. Total time 35 min's, including more than 50% on discussion with family and coordinating care. CC:  Acute on chronic respiratory failure. COPD exac          Subjective:     Pt was seen and examined with the nurse in the morning round. \" My breathing is better\"    Review of systems  General: No fevers or chills. Cardiovascular: No chest pain or pressure. No palpitations. Pulmonary: + cough, SOB  Gastrointestinal: No nausea, vomiting. Objective:      Visit Vitals  /73 (BP 1 Location: Right upper arm, BP Patient Position: Sitting)   Pulse 80   Temp 97.4 °F (36.3 °C)   Resp 18   Ht 5' 8\" (1.727 m)   Wt 71.2 kg (157 lb)   SpO2 99%   BMI 23.87 kg/m²       Physical Exam:    Gen: NAD, chronic ill appearing. On NC O2. Hard of hearing  Heent:  MMM, NC, AT. Cor: s1s2 RRR. No MRG. PMI mid 5th intercostal space. Resp:  Decreased BS b/l  Abd:  NT ND.  BS positive. No rebound or guarding. No masses. Ext: No edema or cyanosis. Intake and Output:  Current Shift:  03/20 0701 - 03/20 1900  In: 240 [P.O.:240]  Out: -   Last three shifts:  03/18 1901 - 03/20 0700  In: 100 [I.V.:100]  Out: -     Labs: Results:       Chemistry Recent Labs     03/20/22  0205 03/19/22 1924   * 115*   * 135*   K 4.1 3.6    101   CO2 19* 20*   BUN 43* 43*   CREA 1.54* 1.52*   CA 8.9 8.7   AGAP 10 14   BUCR 28* 28*   * 172*   TP 7.0 6.4   ALB 2.2* 2.6*   GLOB 4.8* 3.8   AGRAT 0.5* 0.7*      CBC w/Diff Recent Labs     03/20/22  0205 03/19/22 1924   WBC 15.6* 14.6*   RBC 3.46* 3.84*   HGB 9.5* 10.4*   HCT 29.9* 32.2*    453*   GRANS 97* 90*   LYMPH 1* 4*   EOS 0 0      Cardiac Enzymes No results for input(s): CPK, CKND1, WILLARD in the last 72 hours. No lab exists for component: CKRMB, TROIP   Coagulation No results for input(s): PTP, INR, APTT, INREXT, INREXT in the last 72 hours.     Lipid Panel No results found for: CHOL, CHOLPOCT, CHOLX, CHLST, CHOLV, 202944, HDL, HDLP, LDL, LDLC, DLDLP, 519228, VLDLC, VLDL, TGLX, TRIGL, TRIGP, TGLPOCT, CHHD, CHHDX   BNP No results for input(s): BNPP in the last 72 hours.    Liver Enzymes Recent Labs     03/20/22  0205   TP 7.0   ALB 2.2*   *      Thyroid Studies Lab Results   Component Value Date/Time    TSH 3.06 04/05/2021 01:30 PM        Procedures/imaging: see electronic medical records for all procedures/Xrays and details which were not copied into this note but were reviewed prior to creation of Plan      Medications Reviewed  Chavo Wood MD

## 2022-03-21 LAB
ALBUMIN SERPL-MCNC: 2.1 G/DL (ref 3.4–5)
ALBUMIN/GLOB SERPL: 0.5 {RATIO} (ref 0.8–1.7)
ALP SERPL-CCNC: 154 U/L (ref 45–117)
ALT SERPL-CCNC: 10 U/L (ref 16–61)
ANION GAP SERPL CALC-SCNC: 8 MMOL/L (ref 3–18)
AST SERPL-CCNC: 11 U/L (ref 10–38)
ATRIAL RATE: 103 BPM
BASOPHILS # BLD: 0 K/UL (ref 0–0.1)
BASOPHILS NFR BLD: 0 % (ref 0–2)
BILIRUB SERPL-MCNC: 0.2 MG/DL (ref 0.2–1)
BUN SERPL-MCNC: 63 MG/DL (ref 7–18)
BUN/CREAT SERPL: 35 (ref 12–20)
CALCIUM SERPL-MCNC: 8.7 MG/DL (ref 8.5–10.1)
CALCULATED P AXIS, ECG09: 63 DEGREES
CALCULATED R AXIS, ECG10: 27 DEGREES
CALCULATED T AXIS, ECG11: 57 DEGREES
CHLORIDE SERPL-SCNC: 104 MMOL/L (ref 100–111)
CO2 SERPL-SCNC: 21 MMOL/L (ref 21–32)
CREAT SERPL-MCNC: 1.82 MG/DL (ref 0.6–1.3)
DIAGNOSIS, 93000: NORMAL
DIFFERENTIAL METHOD BLD: ABNORMAL
EOSINOPHIL # BLD: 0 K/UL (ref 0–0.4)
EOSINOPHIL NFR BLD: 0 % (ref 0–5)
ERYTHROCYTE [DISTWIDTH] IN BLOOD BY AUTOMATED COUNT: 14.6 % (ref 11.6–14.5)
GLOBULIN SER CALC-MCNC: 4.4 G/DL (ref 2–4)
GLUCOSE SERPL-MCNC: 159 MG/DL (ref 74–99)
HCT VFR BLD AUTO: 29 % (ref 36–48)
HGB BLD-MCNC: 9.3 G/DL (ref 13–16)
IMM GRANULOCYTES # BLD AUTO: 0.2 K/UL (ref 0–0.04)
IMM GRANULOCYTES NFR BLD AUTO: 1 % (ref 0–0.5)
LYMPHOCYTES # BLD: 0.4 K/UL (ref 0.9–3.6)
LYMPHOCYTES NFR BLD: 3 % (ref 21–52)
MAGNESIUM SERPL-MCNC: 2.4 MG/DL (ref 1.6–2.6)
MCH RBC QN AUTO: 27.4 PG (ref 24–34)
MCHC RBC AUTO-ENTMCNC: 32.1 G/DL (ref 31–37)
MCV RBC AUTO: 85.5 FL (ref 78–100)
MONOCYTES # BLD: 0.2 K/UL (ref 0.05–1.2)
MONOCYTES NFR BLD: 1 % (ref 3–10)
NEUTS SEG # BLD: 11.3 K/UL (ref 1.8–8)
NEUTS SEG NFR BLD: 94 % (ref 40–73)
NRBC # BLD: 0 K/UL (ref 0–0.01)
NRBC BLD-RTO: 0 PER 100 WBC
P-R INTERVAL, ECG05: 162 MS
PLATELET # BLD AUTO: 421 K/UL (ref 135–420)
PMV BLD AUTO: 9.3 FL (ref 9.2–11.8)
POTASSIUM SERPL-SCNC: 4.3 MMOL/L (ref 3.5–5.5)
PROT SERPL-MCNC: 6.5 G/DL (ref 6.4–8.2)
Q-T INTERVAL, ECG07: 360 MS
QRS DURATION, ECG06: 82 MS
QTC CALCULATION (BEZET), ECG08: 471 MS
RBC # BLD AUTO: 3.39 M/UL (ref 4.35–5.65)
SODIUM SERPL-SCNC: 133 MMOL/L (ref 136–145)
VENTRICULAR RATE, ECG03: 103 BPM
WBC # BLD AUTO: 12 K/UL (ref 4.6–13.2)

## 2022-03-21 PROCEDURE — 74011250636 HC RX REV CODE- 250/636: Performed by: INTERNAL MEDICINE

## 2022-03-21 PROCEDURE — 74011250637 HC RX REV CODE- 250/637: Performed by: INTERNAL MEDICINE

## 2022-03-21 PROCEDURE — 36415 COLL VENOUS BLD VENIPUNCTURE: CPT

## 2022-03-21 PROCEDURE — 74011000258 HC RX REV CODE- 258: Performed by: INTERNAL MEDICINE

## 2022-03-21 PROCEDURE — 80053 COMPREHEN METABOLIC PANEL: CPT

## 2022-03-21 PROCEDURE — 74011000250 HC RX REV CODE- 250: Performed by: INTERNAL MEDICINE

## 2022-03-21 PROCEDURE — 94760 N-INVAS EAR/PLS OXIMETRY 1: CPT

## 2022-03-21 PROCEDURE — 85025 COMPLETE CBC W/AUTO DIFF WBC: CPT

## 2022-03-21 PROCEDURE — 99221 1ST HOSP IP/OBS SF/LOW 40: CPT | Performed by: NURSE PRACTITIONER

## 2022-03-21 PROCEDURE — 65660000000 HC RM CCU STEPDOWN

## 2022-03-21 PROCEDURE — C9113 INJ PANTOPRAZOLE SODIUM, VIA: HCPCS | Performed by: INTERNAL MEDICINE

## 2022-03-21 PROCEDURE — 83735 ASSAY OF MAGNESIUM: CPT

## 2022-03-21 PROCEDURE — 94640 AIRWAY INHALATION TREATMENT: CPT

## 2022-03-21 PROCEDURE — 77010033678 HC OXYGEN DAILY

## 2022-03-21 RX ADMIN — FAMOTIDINE 20 MG: 20 TABLET ORAL at 09:22

## 2022-03-21 RX ADMIN — PIPERACILLIN AND TAZOBACTAM 3.38 G: 3; .375 INJECTION, POWDER, LYOPHILIZED, FOR SOLUTION INTRAVENOUS at 21:56

## 2022-03-21 RX ADMIN — ARFORMOTEROL TARTRATE 15 MCG: 15 SOLUTION RESPIRATORY (INHALATION) at 08:20

## 2022-03-21 RX ADMIN — PIPERACILLIN AND TAZOBACTAM 3.38 G: 3; .375 INJECTION, POWDER, LYOPHILIZED, FOR SOLUTION INTRAVENOUS at 02:49

## 2022-03-21 RX ADMIN — FERROUS SULFATE TAB 325 MG (65 MG ELEMENTAL FE) 325 MG: 325 (65 FE) TAB at 17:02

## 2022-03-21 RX ADMIN — SODIUM CHLORIDE, PRESERVATIVE FREE 40 MG: 5 INJECTION INTRAVENOUS at 21:57

## 2022-03-21 RX ADMIN — PIPERACILLIN AND TAZOBACTAM 3.38 G: 3; .375 INJECTION, POWDER, LYOPHILIZED, FOR SOLUTION INTRAVENOUS at 15:28

## 2022-03-21 RX ADMIN — FERROUS SULFATE TAB 325 MG (65 MG ELEMENTAL FE) 325 MG: 325 (65 FE) TAB at 09:21

## 2022-03-21 RX ADMIN — SODIUM CHLORIDE, PRESERVATIVE FREE 20 ML: 5 INJECTION INTRAVENOUS at 21:57

## 2022-03-21 RX ADMIN — AMLODIPINE BESYLATE 5 MG: 5 TABLET ORAL at 09:21

## 2022-03-21 RX ADMIN — APIXABAN 5 MG: 5 TABLET, FILM COATED ORAL at 09:21

## 2022-03-21 RX ADMIN — SODIUM CHLORIDE, PRESERVATIVE FREE 40 MG: 5 INJECTION INTRAVENOUS at 09:21

## 2022-03-21 RX ADMIN — METHYLPREDNISOLONE SODIUM SUCCINATE 60 MG: 40 INJECTION, POWDER, FOR SOLUTION INTRAMUSCULAR; INTRAVENOUS at 12:27

## 2022-03-21 RX ADMIN — TAMSULOSIN HYDROCHLORIDE 0.4 MG: 0.4 CAPSULE ORAL at 18:21

## 2022-03-21 RX ADMIN — APIXABAN 5 MG: 5 TABLET, FILM COATED ORAL at 21:56

## 2022-03-21 RX ADMIN — GABAPENTIN 100 MG: 100 CAPSULE ORAL at 17:02

## 2022-03-21 RX ADMIN — IPRATROPIUM BROMIDE AND ALBUTEROL SULFATE 3 ML: .5; 3 SOLUTION RESPIRATORY (INHALATION) at 08:20

## 2022-03-21 RX ADMIN — IPRATROPIUM BROMIDE AND ALBUTEROL SULFATE 3 ML: .5; 3 SOLUTION RESPIRATORY (INHALATION) at 12:12

## 2022-03-21 RX ADMIN — GABAPENTIN 100 MG: 100 CAPSULE ORAL at 21:56

## 2022-03-21 RX ADMIN — ARFORMOTEROL TARTRATE 15 MCG: 15 SOLUTION RESPIRATORY (INHALATION) at 20:59

## 2022-03-21 RX ADMIN — BUDESONIDE 500 MCG: 0.5 INHALANT RESPIRATORY (INHALATION) at 20:59

## 2022-03-21 RX ADMIN — IPRATROPIUM BROMIDE AND ALBUTEROL SULFATE 3 ML: .5; 3 SOLUTION RESPIRATORY (INHALATION) at 20:59

## 2022-03-21 RX ADMIN — SODIUM CHLORIDE, PRESERVATIVE FREE 20 ML: 5 INJECTION INTRAVENOUS at 05:42

## 2022-03-21 RX ADMIN — PIPERACILLIN AND TAZOBACTAM 3.38 G: 3; .375 INJECTION, POWDER, LYOPHILIZED, FOR SOLUTION INTRAVENOUS at 09:22

## 2022-03-21 RX ADMIN — IPRATROPIUM BROMIDE AND ALBUTEROL SULFATE 3 ML: .5; 3 SOLUTION RESPIRATORY (INHALATION) at 15:39

## 2022-03-21 RX ADMIN — FUROSEMIDE 20 MG: 20 TABLET ORAL at 09:21

## 2022-03-21 RX ADMIN — GABAPENTIN 100 MG: 100 CAPSULE ORAL at 09:21

## 2022-03-21 RX ADMIN — SODIUM CHLORIDE, PRESERVATIVE FREE 10 ML: 5 INJECTION INTRAVENOUS at 15:32

## 2022-03-21 RX ADMIN — METHYLPREDNISOLONE SODIUM SUCCINATE 60 MG: 40 INJECTION, POWDER, FOR SOLUTION INTRAMUSCULAR; INTRAVENOUS at 23:56

## 2022-03-21 RX ADMIN — BUDESONIDE 500 MCG: 0.5 INHALANT RESPIRATORY (INHALATION) at 08:20

## 2022-03-21 RX ADMIN — METHYLPREDNISOLONE SODIUM SUCCINATE 60 MG: 40 INJECTION, POWDER, FOR SOLUTION INTRAMUSCULAR; INTRAVENOUS at 05:42

## 2022-03-21 RX ADMIN — METHYLPREDNISOLONE SODIUM SUCCINATE 60 MG: 40 INJECTION, POWDER, FOR SOLUTION INTRAMUSCULAR; INTRAVENOUS at 18:21

## 2022-03-21 NOTE — WOUND CARE
IP WOUND CONSULT    Nicky Bumpers  MEDICAL RECORD NUMBER:  [id number]  AGE: 64 y.o. GENDER: male  : 1945  TODAY'S DATE:  3/21/2022    GENERAL     [] Follow-up   [x] pink un blanchable area to sacrum    Nicky Bumpers is a 64 y.o. male referred by:   [] Physician  [] Nursing  [x] Other: found on chart review      PAST MEDICAL HISTORY    Past Medical History:   Diagnosis Date    Brain aneurysm     Brain aneurysm     Cancer ([facility] [address]     prostate    CHF (congestive heart failure) ([facility] [address] [facility]     CKD (chronic kidney disease)     Closed nondisplaced supracondylar fracture of lower end of left femur without intracondylar extension with delayed healing     COPD (chronic obstructive pulmonary disease) ([facility] [address] [facility]     Debility     History of home oxygen therapy     [place] (hard of hearing)     Hypertension     Nocturia     On home oxygen therapy     PAF (paroxysmal atrial fibrillation) ([facility] [address]     Pneumonia     Prostate CA ([facility] [address]     Prostate neoplasm     Radiation effect         PAST SURGICAL HISTORY    Past Surgical History:   Procedure Laterality Date    HX HERNIA REPAIR      HX HIP ARTHROSCOPY  2021       FAMILY HISTORY    Family History   Problem Relation Age of Onset    Heart Disease Mother        SOCIAL HISTORY    Social History     Tobacco Use    Smoking status: Former Smoker     Types: Cigarettes    Smokeless tobacco: Never Used   Substance Use Topics    Alcohol use: No    Drug use: Never       ALLERGIES    Allergies   Allergen Reactions    Aspirin Other (comments)     \"messes my stomach up\"    Bactrim [Sulfamethoxazole-Trimethoprim] Unknown (comments)       MEDICATIONS    No current facility-administered medications on file prior to encounter. Current Outpatient Medications on File Prior to Encounter   Medication Sig Dispense Refill    predniSONE (DELTASONE) 20 mg tablet Take 20 mg by mouth daily (with lunch).  10 Tablet 0    chlorthalidone (HYGROTON) 25 mg tablet Take 25 mg by mouth daily.      traMADoL (ULTRAM) 50 mg tablet Take 50 mg by mouth every eight (8) hours as needed for Pain.  gabapentin (NEURONTIN) 100 mg capsule Take 100 mg by mouth three (3) times daily.  apixaban (ELIQUIS) 5 mg tablet Take 1 Tablet by mouth two (2) times a day. 60 Tablet 0    collagenase (SANTYL) 250 unit/gram ointment Apply  to affected area daily. 30 g 1    arformoteroL (BROVANA) 15 mcg/2 mL nebu neb solution 2 mL by Nebulization route two (2) times a day. 30 Vial 0    budesonide (PULMICORT) 0.5 mg/2 mL nbsp 2 mL by Nebulization route two (2) times a day. 30 Each 0    famotidine (PEPCID) 20 mg tablet Take 1 Tab by mouth daily. 30 Tab 0    ferrous sulfate 325 mg (65 mg iron) tablet Take 1 Tab by mouth two (2) times daily (with meals). 30 Tab 0    amLODIPine (NORVASC) 5 mg tablet Take 1 Tab by mouth daily. 30 Tab 0    albuterol-ipratropium (DUO-NEB) 2.5 mg-0.5 mg/3 ml nebu 3 mL by Nebulization route every four (4) hours as needed for Wheezing. (Patient not taking: Reported on 10/9/2021) 30 Nebule 0    albuterol (PROVENTIL HFA, VENTOLIN HFA, PROAIR HFA) 90 mcg/actuation inhaler Take 2 Puffs by inhalation every four (4) hours as needed for Wheezing or Shortness of Breath. 1 Inhaler 1    OXYGEN-AIR DELIVERY SYSTEMS 2 L/min by Nasal route continuous.  furosemide (Lasix) 20 mg tablet Take 20 mg by mouth daily.  dextran 70/hypromellose (ARTIFICIAL TEARS, PF, OP) Administer 2 Drops to both eyes daily as needed for Other (dry eyes).  tamsulosin (FLOMAX) 0.4 mg capsule Take 0.4 mg by mouth every evening.          Wt Readings from Last 3 Encounters:   03/19/22 71.2 kg (157 lb)   02/05/22 71.9 kg (158 lb 8.2 oz)   10/06/21 84.1 kg (185 lb 6.5 oz)       [unfilled]  Visit Vitals  /75 (BP 1 Location: Left upper arm, BP Patient Position: Sitting)   Pulse 73   Temp 97.8 °F (36.6 °C)   Resp 18   Ht 5' 8\" (1.727 m)   Wt 71.2 kg (157 lb)   SpO2 100%   BMI 23.87 kg/m²       ASSESSMENT Skin impairment Identification:  Type: no wound    Contributing Factors: none      Skin intact and skin blanchable at this time    PLAN     Skin Care & Pressure Relief Recommendations  Minimize layers of linen  Pads under patient to optimize support surface  Turn/reposition approximately every 2 hours  Promote continence; Skin Protective lotion/cream to buttocks and sacrum daily and as needed with incontinence care      Recommendations: keep mepilex border in place  Replace if soiled peal back and assess skin every shift and consult Woundcare if concerns arise.     Teaching completed with:   [x] Patient           [] Family member       [] Caregiver          [x] Nursing  [] Other    Patient/Caregiver Teaching:  Level of patient/caregiver understanding able to:   [x] Indicates understanding       [] Needs reinforcement  [] Unsuccessful      [] Verbal Understanding  [] Demonstrated understanding       [] No evidence of learning  [] Refused teaching         [] N/A       Electronically signed by Eduardo Dyer RN on 3/21/2022 at 3:34 PM

## 2022-03-21 NOTE — PROGRESS NOTES
Chart reviewed, at this time [facility], cm notified spouse to provide update, at this time spouse unsure if she wants pt to go to a inpatient rehab facility, would like to think about it [facility] , spouse also informed cm that her spouse is medicaid insured provided ID # [id number] for long term care if she decides, spouse, at this time spouse informed cm that her daughter has been staying with her and has been helping with patients care so she is a little concerned about taking care of pt alone, spouse plans to think about things more before making a final decision.

## 2022-03-21 NOTE — PROGRESS NOTES
Hospitalist Progress Note    Patient: Jocelyn Ortega MRN: [id number]  CSN: [id number]    YOB: 1945  Age: 64 y.o. Sex: male    DOA: 3/19/2022 LOS:  LOS: 2 days                Assessment/Plan     Patient Active Problem List   Diagnosis Code    Hypertension I10    Chronic obstructive pulmonary disease ([facility]) J44.9    Chronic renal disease, stage 3, moderately decreased glomerular filtration rate between 30-59 mL/min/1.73 square meter ([facility]) N18.30    Age-related physical debility R54    Acute on chronic respiratory failure with hypoxemia ([facility]) J96.21    PAF (paroxysmal atrial fibrillation) ([facility]) I48.0    Avascular necrosis of bone of left hip ([facility]) M87.052    Anemia D64.9    COPD with acute exacerbation ([facility]) J44.1    Acute exacerbation of chronic obstructive pulmonary disease (COPD) ([facility]) J44.1    Loculated pleural effusion J90    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  male who has history of COPD, on home oxygen 2 L, prostate cancer, CHF, chronic kidney disease stage II, hard of hearing and paroxysmal atrial fibrillation on Eliquis presents to the emergency room via ambulance with complaints of fever of 100.4 and increasing cough with dyspnea. Acute on chronic respiratory failure-   2nd to COPD exac. On NC O2.     COPD with acute exacerbation-   on Brovana and Pulmicort nebulizers  Solumedrol  Neb treatment as needed     Chronic loculated right pleural effusion -   on IV Zosyn  On Lasix     Paroxysmal atrial fib last noticed in April of last year -   rate controlled.  on Eliquis.     Chronic kidney disease -   Avoid nephrotoxic agents trend BMP  Dose medications accordingly monitor for signs of urinary retention  Post void residual every shift strict I's and O's     Hard of hearing With early dementia -   high risks for fall     PT and OT consult        DVT/GI Prophylaxis: ELIQUIS    Disposition : 1-2 days    Review of systems  General: No fevers or chills. Cardiovascular: No chest pain or pressure. No palpitations. Pulmonary: see above. Gastrointestinal: No nausea, vomiting. Physical Exam:  General: Awake, cooperative, no acute distress    HEENT: NC, Atraumatic. PERRLA, anicteric sclerae. Lungs: Decreased breath sounds bilaterally. Heart:  S1 S2,  No murmur, No Rubs, No Gallops  Abdomen: Soft, Non distended, Non tender.  +Bowel sounds,   Extremities: No c/c/e  Psych:   Not anxious or agitated. Neurologic:  No acute neurological deficit. Vital signs/Intake and Output:  Visit Vitals  BP (!) 118/44 (BP 1 Location: Left upper arm, BP Patient Position: Sitting)   Pulse 75   Temp 97.3 °F (36.3 °C)   Resp 20   Ht 5' 8\" (1.727 m)   Wt 71.2 kg (157 lb)   SpO2 98%   BMI 23.87 kg/m²     Current Shift:  No intake/output data recorded. Last three shifts:  03/20 0701 - 03/21 1900  In: 1200 [P.O.:1200]  Out: -             Labs: Results:       Chemistry Recent Labs     03/21/22  0125 03/20/22  0205 03/19/22 1924   * 163* 115*   * 132* 135*   K 4.3 4.1 3.6    103 101   CO2 21 19* 20*   BUN 63* 43* 43*   CREA 1.82* 1.54* 1.52*   CA 8.7 8.9 8.7   AGAP 8 10 14   BUCR 35* 28* 28*   * 155* 172*   TP 6.5 7.0 6.4   ALB 2.1* 2.2* 2.6*   GLOB 4.4* 4.8* 3.8   AGRAT 0.5* 0.5* 0.7*      CBC w/Diff Recent Labs     03/21/22  0125 03/20/22  0205 03/19/22 1924   WBC 12.0 15.6* 14.6*   RBC 3.39* 3.46* 3.84*   HGB 9.3* 9.5* 10.4*   HCT 29.0* 29.9* 32.2*   * 403 453*   GRANS 94* 97* 90*   LYMPH 3* 1* 4*   EOS 0 0 0      Cardiac Enzymes No results for input(s): CPK, CKND1, WILLARD in the last 72 hours. No lab exists for component: CKRMB, TROIP   Coagulation No results for input(s): PTP, INR, APTT, INREXT in the last 72 hours.     Lipid Panel No results found for: CHOL, CHOLPOCT, CHOLX, CHLST, CHOLV, 718248, HDL, HDLP, LDL, LDLC, DLDLP, 952708, VLDLC, VLDL, TGLX, TRIGL, TRIGP, TGLPOCT, CHHD, CHHDX   BNP No results for input(s): BNPP in the last 72 hours.    Liver Enzymes Recent Labs     03/21/22  0125   TP 6.5   ALB 2.1*   *      Thyroid Studies Lab Results   Component Value Date/Time    TSH 3.06 04/05/2021 01:30 PM        Procedures/imaging: see electronic medical records for all procedures/Xrays and details which were not copied into this note but were reviewed prior to creation of Plan

## 2022-03-21 NOTE — ACP (ADVANCE CARE PLANNING)
Advance Care Planning     General Advance Care Planning (ACP) Conversation    Date of Conversation: 3/19/2022  Conducted with: Patient with Decision Making Capacity    Healthcare Decision Maker:     Primary Decision Maker: [address] - [phone]    Secondary Decision Maker: Tarsha Mills - Daughter - [phone]      Today we documented Decision Maker(s) consistent with ACP documents on file. Content/Action Overview: Has ACP document(s) on file - reflects the patient's care preferences  Reviewed DNR/DNI and patient confirms current DNR status - completed forms on file   DNR, full interventions (2-week trial of ventilator support with no tracheostomy in the event of respiratory distress pre-arrest), no feeding tubes    Length of Voluntary ACP Conversation in minutes:  <16 minutes (Non-Billable)     3/21/2022 0935 Seen today in room 342 along with Martha Sen, GEMMA. Sitting on side of the bed. Awake, alert. Extremely hard of hearing. Well aware that he is in the hospital and is hoping for discharge soon. Came to the ED on 3/19/2022 from home per EMS  for shortness of breath. He had run out of albuterol and was nebulizing plain water. He uses home oxygen    Admitted for COPD exacerbation (nebulizers, IV steroids(, chronic loculated right pleural effusion (IV antibiotics)    PMH (from record review) significant for prostate cancer, cerebral aneurysm, COPD, atrial fibrillation. CKD, early dementia    The palliative care team has been consulted for end stage disease    Introduced the role of palliative medicine for the hospitalized patient. He was familiar with the team as we had seen him during previous admissions. Lives with his wife. Prior to admission,he was note independent in ADLs and needed assistance for functional. Uses cane and walker for ambulation  assistance. Reviewed current documents on file (AMD and POST).  He confirmed that he did not want resuscitation in the event of cardiac arrest but accepts intubation with two week trial of artificial ventilation but no tracheostomy and no feeding tube and he also confirmed that his wife and daughter are his designated MPOAs. After meeting with patient, the goals of care have been defined. Code status remains: DNR/ okay with intubation/ limited interventions/ no feeding tube  AMD status: on file naming his wife and daughter as his MPOAs Will sign off but remain available for reconsult as needed/if appropriate. Thank you for the Palliative Medicine consult and allowing us to participate in the care of Sloan Barber Gutierrez RN, MSN  Palliative Medicine   [phone]

## 2022-03-21 NOTE — CONSULTS
Palliative Medicine Consult    Patient Name: Tan Graff  YOB: 1945    Date of Initial Consult: 3/21/2022  Reason for Consult: Goals of care discussions  Requesting Provider: Dr. Lidia Dougherty  Primary Care Physician: Jack Rodrigez MD      SUMMARY:   Tan Graff is a 64 y.o. 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 Current medical issues leading to Palliative Medicine involvement include: Goals of care. Of note, patient is well-known to our palliative service from hospitalizations. PALLIATIVE DIAGNOSES:   1. Goals of care discussions  2. COPD with acute exacerbation  3. CHF  4. Advanced age/debility       PLAN:   1. Goals of care discussions: Palliative medicine team including Eliz Carlos RN and I met with patient at patient's bedside. Patient is awake, alert, oriented x4. He is extremely hard of hearing but can hear shouting. Patient is known to our palliative service; he has an AMD on file naming his wife Ritesh Canales as primary medical power of  and daughter Jim Borges as secondary medical power of . Reviewed this with patient and he states that this remains accurate. Patient also has a POST Form on file with the following measures: DNR, full interventions (2-week trial of ventilator support with no tracheostomy in the event of respiratory distress pre-arrest), no feeding tubes. Patient confirms that his healthcare wishes remain the same. Patient feels overall improved and would like to know when he is getting discharged. Will sign off as goals of care have been established. Please re-consult should it be warranted.  Thank you for the opportunity to provide care to this patient. 2. COPD with acute exacerbation: Recent hospitalization 2-2-22 for same. Patient is on 2 L of oxygen at home currently on 2 L here. He states he is back at baseline and wants to know when he can go home. 3. CHF: Currently compensated, continuing with cardiac medications, fluid restriction, and salt restriction  4. Advanced age/debility: 73-year-old male who lives at home with his wife. He needs assistance with functional ADLs. He ambulates with a cane or a walker depending on the day and distance of ambulation. 5. Initial consult note routed to primary continuity provider  6. Communicated plan of care with: Palliative IDT       GOALS OF CARE / TREATMENT PREFERENCES:   [====Goals of Care====]  GOALS OF CARE: DNR, full interventions (2-week trial of ventilator support with no tracheostomy in the event of respiratory distress pre-arrest), no feeding tubes  Patient/Health Care Proxy Stated Goals: Prolong life      TREATMENT PREFERENCES:   Code Status: DNR    Advance Care Planning:  Advance Care Planning 3/19/2022   Patient's Healthcare Decision Maker is: -   Primary Decision Maker Name -   Primary Decision Maker Phone Number -   Primary Decision Maker Relationship to Patient -   Confirm Advance Directive None   Patient Would Like to Complete Advance Directive -   Does the patient have other document types -       Medical Interventions: Full interventions (2-week ventilator trial, no trach)    Artificially Administered Nutrition: No feeding tube      The palliative care team has discussed with patient / health care proxy about goals of care / treatment preferences for patient.  [====Goals of Care====]         HISTORY:     History obtained from: Patient, chart    CHIEF COMPLAINT: Dyspnea improved, back to baseline. Wants to know when he can go home.     HPI/SUBJECTIVE:    The patient is:   [x] Verbal and participatory  [] Non-participatory due to:   Extremely hard of hearing but does hear when shouting. Patient is oriented x4    Clinical Pain Assessment (nonverbal scale for severity on nonverbal patients):   Clinical Pain Assessment  Severity: 0            FUNCTIONAL ASSESSMENT:     Palliative Performance Scale (PPS):  PPS: 50       PSYCHOSOCIAL/SPIRITUAL SCREENING:     Advance Care Planning:  Advance Care Planning 3/19/2022   Patient's Healthcare Decision Maker is: -   Primary Decision Maker Name -   Primary Decision Maker Phone Number -   Primary Decision Maker Relationship to Patient -   Confirm Advance Directive None   Patient Would Like to Complete Advance Directive -   Does the patient have other document types -        Any spiritual / Taoist concerns:  [] Yes /  [x] No    Caregiver Burnout:  [] Yes /  [] No /  [x] No Caregiver Present      Anticipatory grief assessment:   [x] Normal  / [] Maladaptive          REVIEW OF SYSTEMS:     Positive and pertinent negative findings in ROS are noted above in HPI. The following systems were [x] reviewed / [] unable to be reviewed as noted in HPI  Other findings are noted below. Systems: constitutional, ears/nose/mouth/throat, respiratory, gastrointestinal, genitourinary, musculoskeletal, integumentary, neurologic, psychiatric, endocrine. Positive findings noted below. Modified ESAS Completed by: provider   Fatigue: 5       Pain: 0   Anxiety: 0 Nausea: 0     Dyspnea: 2 (This is patient's baseline)     Constipation: No              PHYSICAL EXAM:     From RN flowsheet:  Wt Readings from Last 3 Encounters:   03/19/22 71.2 kg (157 lb)   02/05/22 71.9 kg (158 lb 8.2 oz)   10/06/21 84.1 kg (185 lb 6.5 oz)     Blood pressure (!) 127/59, pulse 75, temperature 98.3 °F (36.8 °C), resp. rate 18, height 5' 8\" (1.727 m), weight 71.2 kg (157 lb), SpO2 99 %.     Pain Scale 1: Numeric (0 - 10)  Pain Intensity 1: 0                   Constitutional: Awake, alert, sitting on the edge of his bed, extremely hard of hearing (hears only when shouting)  Eyes: pupils equal, anicteric  ENMT: no nasal discharge, moist mucous membranes  Cardiovascular: regular rhythm, distal pulses intact  Respiratory: breathing not labored, symmetric, on 2 L nasal cannula  Gastrointestinal: soft non-tender  Musculoskeletal: no deformity, no tenderness to palpation  Skin: warm, dry  Neurologic: following commands, moving all extremities, oriented to person place and time  Psychiatric: full affect, no hallucinations         HISTORY:     Principal Problem:    COPD with acute exacerbation ([address] (9/30/2021)    Active Problems:    Hypertension ()      Chronic renal disease, stage 3, moderately decreased glomerular filtration rate between 30-59 mL/min/1.73 square meter ([facility]) (7/20/2018)      Acute on chronic respiratory failure with hypoxemia ([facility]) (8/19/2019)      PAF (paroxysmal atrial fibrillation) ([address] (4/5/2021)      Anemia (4/11/2021)      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 (2/3/2022)      Right lower lobe lung mass (2/3/2022)      Bilateral deafness (2/3/2022)      Acute deep vein thrombosis (DVT) of femoral vein of right lower extremity ([facility]) (2/3/2022)      Past Medical History:   Diagnosis Date    Brain aneurysm     Brain aneurysm     Cancer ([address]     prostate    CHF (congestive heart failure) ([facility])     CKD (chronic kidney disease)     Closed nondisplaced supracondylar fracture of lower end of left femur without intracondylar extension with delayed healing     COPD (chronic obstructive pulmonary disease) ([facility])     Debility     History of home oxygen therapy     [place] (hard of hearing)     Hypertension     Nocturia     On home oxygen therapy     PAF (paroxysmal atrial fibrillation) ([facility])     Pneumonia     Prostate CA ([address]     Prostate neoplasm     Radiation effect       Past Surgical History:   Procedure Laterality Date    HX HERNIA REPAIR      HX HIP ARTHROSCOPY  04/09/2021      Family History   Problem Relation Age of Onset    Heart Disease Mother       History reviewed, no pertinent family history.   Social History     Tobacco Use    Smoking status: Former Smoker     Types: Cigarettes    Smokeless tobacco: Never Used   Substance Use Topics    Alcohol use: No     Allergies   Allergen Reactions    Aspirin Other (comments)     \"messes my stomach up\"    Bactrim [Sulfamethoxazole-Trimethoprim] Unknown (comments)      Current Facility-Administered Medications   Medication Dose Route Frequency    albuterol (PROVENTIL HFA, VENTOLIN HFA, PROAIR HFA) inhaler 2 Puff  2 Puff Inhalation Q4H PRN    albuterol-ipratropium (DUO-NEB) 2.5 MG-0.5 MG/3 ML  3 mL Nebulization Q4H PRN    amLODIPine (NORVASC) tablet 5 mg  5 mg Oral DAILY    apixaban (ELIQUIS) tablet 5 mg  5 mg Oral BID    arformoteroL (BROVANA) neb solution 15 mcg  15 mcg Nebulization BID RT    budesonide (PULMICORT) 500 mcg/2 ml nebulizer suspension  500 mcg Nebulization BID RT    collagenase (SANTYL) 250 unit/gram ointment   Topical DAILY    polyvinyl alcohol (LIQUIFILM TEARS) 1.4 % ophthalmic solution 1 Drop  1 Drop Ophthalmic PRN    famotidine (PEPCID) tablet 20 mg  20 mg Oral DAILY    ferrous sulfate tablet 325 mg  325 mg Oral BID WITH MEALS    furosemide (LASIX) tablet 20 mg  20 mg Oral DAILY    tamsulosin (FLOMAX) capsule 0.4 mg  0.4 mg Oral QPM    gabapentin (NEURONTIN) capsule 100 mg  100 mg Oral TID    traMADoL (ULTRAM) tablet 50 mg  50 mg Oral Q8H PRN    albuterol-ipratropium (DUO-NEB) 2.5 MG-0.5 MG/3 ML  3 mL Nebulization Q4H RT    methylPREDNISolone (PF) (SOLU-MEDROL) injection 60 mg  60 mg IntraVENous Q6H    pantoprazole (PROTONIX) 40 mg in 0.9% sodium chloride 10 mL injection  40 mg IntraVENous Q12H    piperacillin-tazobactam (ZOSYN) 3.375 g in 0.9% sodium chloride (MBP/ADV) 100 mL MBP  3.375 g IntraVENous Q6H    sodium chloride (NS) flush 5-40 mL  5-40 mL IntraVENous Q8H    sodium chloride (NS) flush 5-40 mL  5-40 mL IntraVENous PRN    acetaminophen (TYLENOL) tablet 650 mg  650 mg Oral Q6H PRN    Or    acetaminophen (TYLENOL) suppository 650 mg  650 mg Rectal Q6H PRN    polyethylene glycol (MIRALAX) packet 17 g  17 g Oral DAILY PRN    ondansetron (ZOFRAN ODT) tablet 4 mg  4 mg Oral Q8H PRN    Or    ondansetron (ZOFRAN) injection 4 mg  4 mg IntraVENous Q6H PRN          LAB AND IMAGING FINDINGS:     Lab Results   Component Value Date/Time    WBC 12.0 03/21/2022 01:25 AM    HGB 9.3 (L) 03/21/2022 01:25 AM    PLATELET 817 (H) 05/52/6957 01:25 AM     Lab Results   Component Value Date/Time    Sodium 133 (L) 03/21/2022 01:25 AM    Potassium 4.3 03/21/2022 01:25 AM    Chloride 104 03/21/2022 01:25 AM    CO2 21 03/21/2022 01:25 AM    BUN 63 (H) 03/21/2022 01:25 AM    Creatinine 1.82 (H) 03/21/2022 01:25 AM    Calcium 8.7 03/21/2022 01:25 AM    Magnesium 2.4 03/21/2022 01:25 AM    Phosphorus 3.4 10/03/2021 05:55 AM      Lab Results   Component Value Date/Time    Alk. phosphatase 154 (H) 03/21/2022 01:25 AM    Protein, total 6.5 03/21/2022 01:25 AM    Albumin 2.1 (L) 03/21/2022 01:25 AM    Globulin 4.4 (H) 03/21/2022 01:25 AM     Lab Results   Component Value Date/Time    INR 1.1 04/18/2021 03:17 PM    Prothrombin time 13.7 04/18/2021 03:17 PM    aPTT 105.0 (H) 10/07/2021 12:30 PM      Lab Results   Component Value Date/Time    Iron 45 (L) 10/03/2021 05:55 AM    TIBC 203 (L) 10/03/2021 05:55 AM    Iron % saturation 22 10/03/2021 05:55 AM      No results found for: PH, PCO2, PO2  No components found for: [place]   Lab Results   Component Value Date/Time    CK 25 (L) 09/30/2021 08:42 PM    CK - MB 1.5 09/30/2021 08:42 PM                Total time: 30 minutes  Counseling / coordination time, spent as noted above:   > 50% counseling / coordination?:  Yes, patient, medical team    Prolonged service was provided for  []30 min   []75 min in face to face time in the presence of the patient, spent as noted above.   Time Start:   Time End:   Note: this can only be billed with 55783 (initial) or 99731 (follow up). If multiple start / stop times, list each separately.

## 2022-03-22 VITALS
OXYGEN SATURATION: 99 % | TEMPERATURE: 97.6 F | WEIGHT: 157 LBS | DIASTOLIC BLOOD PRESSURE: 64 MMHG | HEART RATE: 83 BPM | HEIGHT: 68 IN | SYSTOLIC BLOOD PRESSURE: 123 MMHG | RESPIRATION RATE: 22 BRPM | BODY MASS INDEX: 23.79 KG/M2

## 2022-03-22 LAB
ALBUMIN SERPL-MCNC: 2.2 G/DL (ref 3.4–5)
ALBUMIN/GLOB SERPL: 0.6 {RATIO} (ref 0.8–1.7)
ALP SERPL-CCNC: 140 U/L (ref 45–117)
ALT SERPL-CCNC: 12 U/L (ref 16–61)
ANION GAP SERPL CALC-SCNC: 9 MMOL/L (ref 3–18)
AST SERPL-CCNC: 9 U/L (ref 10–38)
BASOPHILS # BLD: 0 K/UL (ref 0–0.1)
BASOPHILS NFR BLD: 0 % (ref 0–2)
BILIRUB SERPL-MCNC: 0.2 MG/DL (ref 0.2–1)
BUN SERPL-MCNC: 68 MG/DL (ref 7–18)
BUN/CREAT SERPL: 33 (ref 12–20)
CALCIUM SERPL-MCNC: 8 MG/DL (ref 8.5–10.1)
CHLORIDE SERPL-SCNC: 104 MMOL/L (ref 100–111)
CO2 SERPL-SCNC: 24 MMOL/L (ref 21–32)
CREAT SERPL-MCNC: 2.04 MG/DL (ref 0.6–1.3)
DIFFERENTIAL METHOD BLD: ABNORMAL
EOSINOPHIL # BLD: 0 K/UL (ref 0–0.4)
EOSINOPHIL NFR BLD: 0 % (ref 0–5)
ERYTHROCYTE [DISTWIDTH] IN BLOOD BY AUTOMATED COUNT: 14.5 % (ref 11.6–14.5)
GLOBULIN SER CALC-MCNC: 3.4 G/DL (ref 2–4)
GLUCOSE SERPL-MCNC: 172 MG/DL (ref 74–99)
HCT VFR BLD AUTO: 27.4 % (ref 36–48)
HGB BLD-MCNC: 8.7 G/DL (ref 13–16)
IMM GRANULOCYTES # BLD AUTO: 0.1 K/UL (ref 0–0.04)
IMM GRANULOCYTES NFR BLD AUTO: 1 % (ref 0–0.5)
LYMPHOCYTES # BLD: 0.3 K/UL (ref 0.9–3.6)
LYMPHOCYTES NFR BLD: 4 % (ref 21–52)
MAGNESIUM SERPL-MCNC: 2.2 MG/DL (ref 1.6–2.6)
MCH RBC QN AUTO: 27.7 PG (ref 24–34)
MCHC RBC AUTO-ENTMCNC: 31.8 G/DL (ref 31–37)
MCV RBC AUTO: 87.3 FL (ref 78–100)
MONOCYTES # BLD: 0.1 K/UL (ref 0.05–1.2)
MONOCYTES NFR BLD: 1 % (ref 3–10)
NEUTS SEG # BLD: 6.8 K/UL (ref 1.8–8)
NEUTS SEG NFR BLD: 94 % (ref 40–73)
NRBC # BLD: 0 K/UL (ref 0–0.01)
NRBC BLD-RTO: 0 PER 100 WBC
PLATELET # BLD AUTO: 369 K/UL (ref 135–420)
PMV BLD AUTO: 9.3 FL (ref 9.2–11.8)
POTASSIUM SERPL-SCNC: 3.4 MMOL/L (ref 3.5–5.5)
PROT SERPL-MCNC: 5.6 G/DL (ref 6.4–8.2)
RBC # BLD AUTO: 3.14 M/UL (ref 4.35–5.65)
SODIUM SERPL-SCNC: 137 MMOL/L (ref 136–145)
WBC # BLD AUTO: 7.3 K/UL (ref 4.6–13.2)

## 2022-03-22 PROCEDURE — 83735 ASSAY OF MAGNESIUM: CPT

## 2022-03-22 PROCEDURE — 74011250637 HC RX REV CODE- 250/637: Performed by: INTERNAL MEDICINE

## 2022-03-22 PROCEDURE — 80053 COMPREHEN METABOLIC PANEL: CPT

## 2022-03-22 PROCEDURE — 36415 COLL VENOUS BLD VENIPUNCTURE: CPT

## 2022-03-22 PROCEDURE — 74011250637 HC RX REV CODE- 250/637: Performed by: HOSPITALIST

## 2022-03-22 PROCEDURE — C9113 INJ PANTOPRAZOLE SODIUM, VIA: HCPCS | Performed by: INTERNAL MEDICINE

## 2022-03-22 PROCEDURE — 85025 COMPLETE CBC W/AUTO DIFF WBC: CPT

## 2022-03-22 PROCEDURE — 74011250636 HC RX REV CODE- 250/636: Performed by: INTERNAL MEDICINE

## 2022-03-22 PROCEDURE — 74011000250 HC RX REV CODE- 250: Performed by: INTERNAL MEDICINE

## 2022-03-22 PROCEDURE — 94640 AIRWAY INHALATION TREATMENT: CPT

## 2022-03-22 PROCEDURE — 74011000258 HC RX REV CODE- 258: Performed by: INTERNAL MEDICINE

## 2022-03-22 RX ORDER — AMOXICILLIN AND CLAVULANATE POTASSIUM 875; 125 MG/1; MG/1
1 TABLET, FILM COATED ORAL DAILY
Qty: 7 TABLET | Refills: 0 | Status: SHIPPED | OUTPATIENT
Start: 2022-03-22 | End: 2022-03-29

## 2022-03-22 RX ORDER — POTASSIUM CHLORIDE 20 MEQ/1
40 TABLET, EXTENDED RELEASE ORAL
Status: COMPLETED | OUTPATIENT
Start: 2022-03-22 | End: 2022-03-22

## 2022-03-22 RX ORDER — PREDNISONE 10 MG/1
TABLET ORAL
Qty: 21 TABLET | Refills: 0 | Status: SHIPPED | OUTPATIENT
Start: 2022-03-22 | End: 2022-04-03

## 2022-03-22 RX ADMIN — GABAPENTIN 100 MG: 100 CAPSULE ORAL at 15:13

## 2022-03-22 RX ADMIN — IPRATROPIUM BROMIDE AND ALBUTEROL SULFATE 3 ML: .5; 3 SOLUTION RESPIRATORY (INHALATION) at 07:38

## 2022-03-22 RX ADMIN — SODIUM CHLORIDE, PRESERVATIVE FREE 40 MG: 5 INJECTION INTRAVENOUS at 09:00

## 2022-03-22 RX ADMIN — GABAPENTIN 100 MG: 100 CAPSULE ORAL at 09:00

## 2022-03-22 RX ADMIN — ARFORMOTEROL TARTRATE 15 MCG: 15 SOLUTION RESPIRATORY (INHALATION) at 07:38

## 2022-03-22 RX ADMIN — IPRATROPIUM BROMIDE AND ALBUTEROL SULFATE 3 ML: .5; 3 SOLUTION RESPIRATORY (INHALATION) at 04:36

## 2022-03-22 RX ADMIN — IPRATROPIUM BROMIDE AND ALBUTEROL SULFATE 3 ML: .5; 3 SOLUTION RESPIRATORY (INHALATION) at 15:28

## 2022-03-22 RX ADMIN — FAMOTIDINE 20 MG: 20 TABLET ORAL at 09:00

## 2022-03-22 RX ADMIN — AMLODIPINE BESYLATE 5 MG: 5 TABLET ORAL at 09:00

## 2022-03-22 RX ADMIN — SODIUM CHLORIDE, PRESERVATIVE FREE 20 ML: 5 INJECTION INTRAVENOUS at 05:54

## 2022-03-22 RX ADMIN — METHYLPREDNISOLONE SODIUM SUCCINATE 60 MG: 40 INJECTION, POWDER, FOR SOLUTION INTRAMUSCULAR; INTRAVENOUS at 05:54

## 2022-03-22 RX ADMIN — IPRATROPIUM BROMIDE AND ALBUTEROL SULFATE 3 ML: .5; 3 SOLUTION RESPIRATORY (INHALATION) at 00:26

## 2022-03-22 RX ADMIN — FUROSEMIDE 20 MG: 20 TABLET ORAL at 09:00

## 2022-03-22 RX ADMIN — BUDESONIDE 500 MCG: 0.5 INHALANT RESPIRATORY (INHALATION) at 07:38

## 2022-03-22 RX ADMIN — FERROUS SULFATE TAB 325 MG (65 MG ELEMENTAL FE) 325 MG: 325 (65 FE) TAB at 09:00

## 2022-03-22 RX ADMIN — METHYLPREDNISOLONE SODIUM SUCCINATE 60 MG: 40 INJECTION, POWDER, FOR SOLUTION INTRAMUSCULAR; INTRAVENOUS at 11:47

## 2022-03-22 RX ADMIN — IPRATROPIUM BROMIDE AND ALBUTEROL SULFATE 3 ML: .5; 3 SOLUTION RESPIRATORY (INHALATION) at 11:27

## 2022-03-22 RX ADMIN — POTASSIUM CHLORIDE 40 MEQ: 20 TABLET, EXTENDED RELEASE ORAL at 16:02

## 2022-03-22 RX ADMIN — APIXABAN 5 MG: 5 TABLET, FILM COATED ORAL at 09:00

## 2022-03-22 RX ADMIN — PIPERACILLIN AND TAZOBACTAM 3.38 G: 3; .375 INJECTION, POWDER, LYOPHILIZED, FOR SOLUTION INTRAVENOUS at 09:01

## 2022-03-22 RX ADMIN — PIPERACILLIN AND TAZOBACTAM 3.38 G: 3; .375 INJECTION, POWDER, LYOPHILIZED, FOR SOLUTION INTRAVENOUS at 15:13

## 2022-03-22 RX ADMIN — PIPERACILLIN AND TAZOBACTAM 3.38 G: 3; .375 INJECTION, POWDER, LYOPHILIZED, FOR SOLUTION INTRAVENOUS at 02:07

## 2022-03-22 NOTE — PROGRESS NOTES
Problem: Falls - Risk of  Goal: *Absence of Falls  Description: Document Ameena Gregory Fall Risk and appropriate interventions in the flowsheet.   Outcome: Progressing Towards Goal  Note: Fall Risk Interventions:  Mobility Interventions: Patient to call before getting OOB,Utilize walker, cane, or other assistive device         Medication Interventions: Teach patient to arise slowly    Elimination Interventions: Call light in reach,Patient to call for help with toileting needs,Urinal in reach

## 2022-03-22 NOTE — PROGRESS NOTES
conducted an initial consultation and Spiritual Assessment for Cleo Estrella, who is a 64 y. o.,male. Patients Primary Language is: [place]. According to the patients EMR Tenriism Affiliation is: Catholic. The reason the Patient came to the hospital is:   Patient Active Problem List    Diagnosis Date Noted    Loculated pleural effusion 02/03/2022    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 02/03/2022    Right lower lobe lung mass 02/03/2022    Bilateral deafness 02/03/2022    Acute deep vein thrombosis (DVT) of femoral vein of right lower extremity ([address] 02/03/2022    Acute exacerbation of chronic [facility] pulmonary disease (COPD) [facility] 10/05/2021    COPD with acute exacerbation ([address] 09/30/2021    Anemia 04/11/2021    Avascular necrosis of bone of left hip ([address] 04/08/2021    PAF (paroxysmal atrial fibrillation) ([facility] 04/05/2021    Acute on chronic respiratory failure with hypoxemia ([facility] 08/19/2019    Age-related physical debility 07/08/2019    Chronic renal disease, stage 3, moderately decreased glomerular filtration rate between 30-59 mL/min/1.73 square meter ([facility]) 07/20/2018    Chronic obstructive pulmonary disease ([address] 04/20/2018    Hypertension         The  provided the following Interventions:  Initiated a relationship of care and support. Explored issues of irais, belief, spirituality and Yazidi/ritual needs while hospitalized. Listened empathically. Provided chaplaincy education. Provided information about Spiritual Care Services. Offered assurance of prayer on patient's behalf. Chart reviewed. The following outcomes where achieved:  Patient shared limited information about both their medical narrative and spiritual journey/beliefs.  confirmed Patient's Tenriism Affiliation. Patient processed feeling about current hospitalization. Patient expressed gratitude for 's visit.     Assessment:  Patient does not have any Faith/cultural needs that will affect patients preferences in health care. Plan:  Chaplains will continue to follow and will provide pastoral care on an as needed/requested basis.  recommends bedside caregivers page  on duty if patient shows signs of acute spiritual or emotional distress. Rev.  Jose Balderas, Certified Respecting [address] [place] [address]  [phone]

## 2022-03-22 NOTE — PROGRESS NOTES
D/C plan: Home. Pt's spouse will transport him home. Met w/ pt at bedside. Pt dressed himself for d/c. Pt has declined HH. [facility] reviewed w/ pt and a copy has been left w/ the pt. Pt's spouse has been notified of the d/c home and that the pt has declined [facility] and she is in agreement w/ the d/c plan. Pt's spouse states that she will transport the pt home and that they live just a few minutes from the hospital and any time he is d/c'd, he transports w/out O2 on and they go straight home and he puts his O2 back on and she states that works well for him and has declined transport. Educated her to have him put back on the O2 ASAP. She verbalized an understanding. Care Management Interventions  PCP Verified by CM: Yes (Dr. Romero Santos)  Palliative Care Criteria Met (RRAT>21 & CHF Dx)?: No  Mode of Transport at Discharge: Other (see comment) (Spouse. )  Transition of Care Consult (CM Consult): Other (Home. Pt has declined HH. )  Discharge Durable Medical Equipment: No (NO.)  Health Maintenance Reviewed: Yes  Physical Therapy Consult: Yes  Occupational Therapy Consult: Yes  Support Systems: Spouse/Significant Other  The Plan for Transition of Care is Related to the Following Treatment Goals : home. Pt has declined [facility]  The Patient and/or Patient Representative was Provided with a Choice of Provider and Agrees with the Discharge Plan?: Yes (The pt is alert and oriented and is in agreement w/ the plan. )  Name of the Patient Representative Who was Provided with a Choice of Provider and Agrees with the Discharge Plan: Pt's spouse: Delio Ibrahim via phone. [place] of Choice List was Provided with Basic Dialogue that Supports the Patient's Individualized Plan of Care/Goals, Treatment Preferences and Shares the Quality Data Associated with the Providers?:  (n/a.  Pt has declined [facility]. )  Discharge Location  Patient Expects to be Discharged to[de-identified] Home (.)

## 2022-03-22 NOTE — PROGRESS NOTES
Physician Progress Note      Laure Jenkins  [facility] #:                  [id number]  :                       1945  ADMIT DATE:       3/19/2022 7:11 PM  [address] [place] DATE:        3/22/2022 5:00 PM  RESPONDING  PROVIDER #:        Deborah Funes MD          QUERY TEXT:    Patient admitted with COPD exacerbation . 02 dependent 2L NC. Noted documentation of acute on chronic  respiratory failure in Hospitalist progress note on 3/20. In order to support the diagnosis of acute respiratory failure, please include additional clinical indicators in your documentation. Or please document if the diagnosis of acute respiratory failure has been ruled out after further study. The medical record reflects the following:  Risk Factors: COPD exacerbation  Clinical Indicators: 3/20/ Hospitalist note \" Acute on chronic respiratory failure: 2nd to COPD exac. On NC O2.\" SOB , wheezing, Tachypena . O2 sats 93 to 100%. No accessory muscle usage or respiratory . Patient ran out of his medication and has been using water and his nebulizing machine  Treatment: 02 L , NC. Acute Respiratory Failure Clinical Indicators per [employer] [employer] Training Guide and Quick Reference Guide:  pO2 < 60 mmHg or SpO2 (pulse oximetry) < 91% breathing room air  pCO2 > 50 and pH < 7.35  P/F ratio (pO2 / FIO2) < 300  pO2 decrease or pCO2 increase by 10 mmHg from baseline (if known)  Supplemental oxygen of 40% or more  Presence of respiratory distress, tachypnea, dyspnea, shortness of breath, wheezing  Unable to speak in complete sentences  Use of accessory muscles to breathe  Extreme anxiety and feeling of impending doom  Tripod position  Confusion/altered mental status/obtunded    Thank You  Cherie Acevedo RN CDI CRCR  Options provided:  -- Acute Respiratory Failure as evidenced by, Please document evidence.   -- Acute Respiratory Failure ruled out after study  -- Acute Respiratory Failure ruled out after study and Chronic Respiratory Failure confirmed  -- Other - I will add my own diagnosis  -- Disagree - Not applicable / Not valid  -- Disagree - Clinically unable to determine / Unknown  -- Refer to Clinical Documentation Reviewer    PROVIDER RESPONSE TEXT:    Acute Respiratory Failure has been ruled out after study. Query created by: Frida Garcia on 3/21/2022 1:06 PM      QUERY TEXT:    Pt admitted with COPD exacerbation and has history of CHF documented and is currently compensated  . If possible, please document in progress notes and discharge summary further specificity regarding the type and acuity of CHF:    The medical record reflects the following:  Risk Factors:SOB  Clinical Indicators:  Hx of  CHF, , SOB, Last echo was 2/3/ 22. Left Ventricle: Left ventricle size is normal. Normal wall thickness. Normal wall motion. Low normal left ventricular systolic function with a visually estimated EF of 50 - 55%. Grade I diastolic dysfunction with normal LAP. There are prominent trabeculations. Treatment:  Lasix 20mg PO    Thank You  Jenise Montez RN CDI CRCR  Options provided:  -- Chronic Systolic CHF/HFrEF  -- Chronic Diastolic CHF/HFpEF  -- Chronic Systolic and Diastolic CHF  -- Other - I will add my own diagnosis  -- Disagree - Not applicable / Not valid  -- Disagree - Clinically unable to determine / Unknown  -- Refer to Clinical Documentation Reviewer    PROVIDER RESPONSE TEXT:    This patient has chronic diastolic CHF/HFpEF.     Query created by: Frida Garcia on 3/21/2022 1:15 PM      Electronically signed by:  Azucena Mcnair MD 3/22/2022 6:19 PM

## 2022-03-22 NOTE — DISCHARGE SUMMARY
Discharge Summary    Patient: Ted Severino MRN: [id number]  CSN: [id number]    YOB: 1945  Age: 64 y.o.   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 ICD-10-CM: J92.0  ICD-9-CM: 511.0, V15.84  2/3/2022 - Present        Right lower lobe lung mass ICD-10-CM: R91.8  ICD-9-CM: 786.6  2/3/2022 - Present        Bilateral deafness ICD-10-CM: H91.93  ICD-9-CM: 389.9  2/3/2022 - Present        Acute deep vein thrombosis (DVT) of femoral vein of right lower extremity (HCC) ICD-10-CM: I82.411  ICD-9-CM: 453.41  2/3/2022 - Present        Acute exacerbation of chronic obstructive pulmonary disease (COPD) ([facility] ICD-10-CM: J44.1  ICD-9-CM: 491.21  10/5/2021 - Present        * (Principal) COPD with acute exacerbation ([facility] ICD-10-CM: J44.1  ICD-9-CM: 491.21  9/30/2021 - Present        Anemia ICD-10-CM: D64.9  ICD-9-CM: 285.9  4/11/2021 - Present        Avascular necrosis of bone of left hip ([facility] ICD-10-CM: M87.052  ICD-9-CM: 733.42  4/8/2021 - Present        PAF (paroxysmal atrial fibrillation) ([facility] ICD-10-CM: I48.0  ICD-9-CM: 427.31  4/5/2021 - Present        Acute on chronic respiratory failure with hypoxemia ([facility] ICD-10-CM: J96.21  ICD-9-CM: 518.84  8/19/2019 - Present        Age-related physical debility ICD-10-CM: R54  ICD-9-CM: 406  7/8/2019 - Present        Chronic renal disease, stage 3, moderately decreased glomerular filtration rate between 30-59 mL/min/1.73 square meter ([facility] ICD-10-CM: N18.30  ICD-9-CM: 585.3  7/20/2018 - Present        Chronic obstructive pulmonary disease ([facility] ICD-10-CM: J44.9  ICD-9-CM: 496  4/20/2018 - Present        Hypertension ICD-10-CM: I10  ICD-9-CM: 401.9  Unknown - Present        RESOLVED: Acute respiratory disease ICD-10-CM: J06.9  ICD-9-CM: 465.9  2/3/2022 - 2/3/2022        RESOLVED: DVT (deep venous thrombosis) ([facility] ICD-10-CM: I82.409  ICD-9-CM: 453.40  10/1/2021 - 2/3/2022        RESOLVED: Febrile illness ICD-10-CM: R50.9  ICD-9-CM: 780.60  9/30/2021 - 2/3/2022        RESOLVED: Cellulitis of left ankle ICD-10-CM: L03.116  ICD-9-CM: 682.6  8/23/2021 - 2/3/2022        RESOLVED: 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 8XXA, L08.9  ICD-9-CM: 958.3  8/20/2021 - 2/3/2022        RESOLVED: Supplemental oxygen dependent ICD-10-CM: Z99.81  ICD-9-CM: V46.2  8/20/2021 - 2/3/2022        RESOLVED: MRSA (methicillin resistant staph aureus) culture positive ICD-10-CM: Z22.322  ICD-9-CM: V02.54  8/20/2021 - 2/3/2022        RESOLVED: Pyogenic inflammation of bone ([facility] ICD-10-CM: M86.9  ICD-9-CM: 730.20  8/20/2021 - 2/3/2022        RESOLVED: Osteomyelitis of left ankle ([facility] ICD-10-CM: M86.9  ICD-9-CM: 730.27  8/20/2021 - 2/3/2022        RESOLVED: Intercondylar fracture of femur ([address] ICD-10-CM: I69.212X  ICD-9-CM: 821.23  7/12/2021 - 2/3/2022        RESOLVED: Left leg pain ICD-10-CM: M79.605  ICD-9-CM: 729.5  7/12/2021 - 2/3/2022        RESOLVED: Open wound of left hip ICD-10-CM: V40.491H  ICD-9-CM: 890.0  5/26/2021 - 2/3/2022        RESOLVED: Scrotal edema ICD-10-CM: N50.89  ICD-9-CM: 608.86  4/23/2021 - 2/3/2022        RESOLVED: Hematoma of left hip ICD-10-CM: I60.95AB  ICD-9-CM: 924.01  4/19/2021 - 2/3/2022        RESOLVED: Cellulitis ICD-10-CM: L03.90  ICD-9-CM: 682.9  4/18/2021 - 4/20/2021        RESOLVED: Acute hip pain ICD-10-CM: M25.559  ICD-9-CM: 719.45  4/18/2021 - 2/3/2022        RESOLVED: Status post total replacement of left hip ICD-10-CM: Q94.119  ICD-9-CM: V43.64  4/14/2021 - 2/3/2022        RESOLVED: Obesity ICD-10-CM: E66.9  ICD-9-CM: 278.00  4/10/2021 - 2/3/2022        RESOLVED: Acute pulmonary edema ([facility] ICD-10-CM: J81.0  ICD-9-CM: 518.4  4/10/2021 - 2/3/2022        RESOLVED: Avascular necrosis ([facility] ICD-10-CM: M87.00  ICD-9-CM: 733.40  4/8/2021 - 4/8/2021        RESOLVED: Left hip pain ICD-10-CM: M25.552  ICD-9-CM: 719.45  4/5/2021 - 2/3/2022        RESOLVED: Acute respiratory failure ([facility] ICD-10-CM: J96.00  ICD-9-CM: 518.81  2/26/2021 - 4/10/2021        RESOLVED: Respiratory distress ICD-10-CM: R06.03  ICD-9-CM: 786.09  2/2/2021 - 4/10/2021        RESOLVED: COPD exacerbation ([facility] ICD-10-CM: J44.1  ICD-9-CM: 491.21  2/2/2021 - 4/10/2021        RESOLVED: Atrial fibrillation with RVR ([facility] ICD-10-CM: I48.91  ICD-9-CM: 427.31  2/2/2021 - 4/10/2021        RESOLVED: COPD (chronic obstructive pulmonary disease) ([facility] ICD-10-CM: J44.9  ICD-9-CM: 496  8/5/2020 - 4/10/2021        RESOLVED: CAP (community acquired pneumonia) ICD-10-CM: J18.9  ICD-9-CM: 486  8/5/2020 - 4/10/2021        RESOLVED: Advanced care planning/counseling discussion ICD-10-CM: P57.74  ICD-9-CM: V65.49  Unknown - 4/10/2021        RESOLVED: Hypokalemia ICD-10-CM: E87.6  ICD-9-CM: 276.8  4/14/2020 - 4/10/2021        RESOLVED: Hyponatremia ICD-10-CM: E87.1  ICD-9-CM: 276.1  4/10/2020 - 4/10/2021        RESOLVED: COPD with acute exacerbation ([facility] ICD-10-CM: J44.1  ICD-9-CM: 491.21  4/9/2020 - 4/10/2021        RESOLVED: Acute respiratory failure with hypoxia and hypercarbia (HCC) ICD-10-CM: J96.01, J96.02  ICD-9-CM: 518.81  4/9/2020 - 4/10/2021        RESOLVED: Pleural effusion, right ICD-10-CM: J90  ICD-9-CM: 511.9  2/22/2020 - 4/10/2021        RESOLVED: Tobacco dependence ICD-10-CM: S24.177  ICD-9-CM: 305.1  2/21/2020 - 2/3/2022        RESOLVED: Hyponatremia ICD-10-CM: E87.1  ICD-9-CM: 276.1  2/21/2020 - 4/10/2021        RESOLVED: Acute on chronic respiratory failure with hypoxia and hypercapnia (HCC) ICD-10-CM: J96.21, J96.22  ICD-9-CM: 518.84, 786.09, 799.02  10/30/2019 - 2/25/2020        RESOLVED: Paroxysmal atrial fibrillation (HCC) ICD-10-CM: I48.0  ICD-9-CM: 427.31  8/19/2019 - 4/10/2021        RESOLVED: Asbestosis ([facility] ICD-10-CM: L42  ICD-9-CM: 525  10/19/2018 - 4/10/2021        RESOLVED: COPD with asthma and status asthmaticus ([facility] ICD-10-CM: J44.9, J45.902  ICD-9-CM: 493.21  7/20/2018 - 2/8/2019        RESOLVED: Status asthmaticus with COPD (chronic obstructive pulmonary disease) ([facility] ICD-10-CM: J44.9, J45.902  ICD-9-CM: 493.21  4/20/2018 - 2/8/2019        RESOLVED: PVC's (premature ventricular contractions) ICD-10-CM: I49.3  ICD-9-CM: 427.69  4/20/2018 - 2/8/2019        RESOLVED: Acute respiratory failure ([facility] ICD-10-CM: J96.00  ICD-9-CM: 518.81  10/2/2014 - 3/18/2017        RESOLVED: URIEL (acute kidney injury) ([facility] ICD-10-CM: N17.9  ICD-9-CM: 584.9  10/2/2014 - 2/8/2019        RESOLVED: Pneumonia ICD-10-CM: J18.9  ICD-9-CM: 486  10/2/2014 - 3/18/2017              Discharge Medications:     Discharge Medication List as of 3/22/2022  4:17 PM      CONTINUE these medications which have NOT CHANGED    Details   chlorthalidone (HYGROTON) 25 mg tablet Take 25 mg by mouth daily. , Historical Med      traMADoL (ULTRAM) 50 mg tablet Take 50 mg by mouth every eight (8) hours as needed for Pain., Historical Med      gabapentin (NEURONTIN) 100 mg capsule Take 100 mg by mouth three (3) times daily. , Historical Med      apixaban (ELIQUIS) 5 mg tablet Take 1 Tablet by mouth two (2) times a day., Normal, Disp-60 Tablet, R-0      collagenase (SANTYL) 250 unit/gram ointment Apply  to affected area daily. , Normal, Disp-30 g, R-1      arformoteroL (BROVANA) 15 mcg/2 mL nebu neb solution 2 mL by Nebulization route two (2) times a day., No Print, Disp-30 Vial, R-0 budesonide (PULMICORT) 0.5 mg/2 mL nbsp 2 mL by Nebulization route two (2) times a day., No Print, Disp-30 Each, R-0      famotidine (PEPCID) 20 mg tablet Take 1 Tab by mouth daily. , No Print, Disp-30 Tab, R-0      ferrous sulfate 325 mg (65 mg iron) tablet Take 1 Tab by mouth two (2) times daily (with meals). , No Print, Disp-30 Tab, R-0      amLODIPine (NORVASC) 5 mg tablet Take 1 Tab by mouth daily. , Normal, Disp-30 Tab, R-0      albuterol (PROVENTIL HFA, VENTOLIN HFA, PROAIR HFA) 90 mcg/actuation inhaler Take 2 Puffs by inhalation every four (4) hours as needed for Wheezing or Shortness of Breath., Normal, Disp-1 Inhaler, R-1      OXYGEN-AIR DELIVERY SYSTEMS 2 L/min by Nasal route continuous. , Historical Med      furosemide (Lasix) 20 mg tablet Take 20 mg by mouth daily. , Historical Med      dextran 70/hypromellose (ARTIFICIAL TEARS, PF, OP) Administer 2 Drops to both eyes daily as needed for Other (dry eyes). , Historical Med      tamsulosin (FLOMAX) 0.4 mg capsule Take 0.4 mg by mouth every evening., Historical Med         STOP taking these medications       predniSONE (DELTASONE) 20 mg tablet Comments:   Reason for Stopping:         albuterol-ipratropium (DUO-NEB) 2.5 mg-0.5 mg/3 ml nebu Comments:   Reason for Stopping:               Discharge Condition: Good    Procedures : None    Consults: None      PHYSICAL EXAM   Visit Vitals  /64 (BP 1 Location: Right upper arm, BP Patient Position: At rest)   Pulse 83   Temp 97.6 °F (36.4 °C)   Resp 22   Ht 5' 8\" (1.727 m)   Wt 71.2 kg (157 lb)   SpO2 99%   BMI 23.87 kg/m²     General: Awake, cooperative, no acute distress    HEENT: NC, Atraumatic. PERRLA, EOMI. Anicteric sclerae. Lungs:  CTA Bilaterally. No Wheezing/Rhonchi/Rales. Heart:  Regular  rhythm,  No murmur, No Rubs, No Gallops  Abdomen: Soft, Non distended, Non tender. +Bowel sounds,   Extremities: No c/c/e  Psych:   Not anxious or agitated. Neurologic:  No acute neurological deficits. Admission HPI :   Casey Day is a 64 y.o.  male who has history of COPD, on home oxygen 2 L, prostate cancer, CHF, chronic kidney disease stage II, hard of hearing and paroxysmal atrial fibrillation on Eliquis presents to the emergency room via ambulance with complaints of fever of 100.4 and increasing cough with dyspnea. Patient apparently ran out of his medication and has been using water and his nebulizing machine patient has been able to ambulate without any difficulties all his wounds have healed he currently denies any chest pain and feels better since getting his steroids and nebulizing treatments that were given in the emergency room he denies any chest pain palpitations main concern is worsening dyspnea and cough after running out of medications. Patient has already had COVID-19 and the vaccine. Hospital Course :   Mr. Brea Wilson was admitted to monitored floor, he was continued on oxygen by NC. His condition improved, he is now at his baseline. COPD with acute exacerbation-   Started on Solumedrol, Brovana and Pulmicort nebulizers. Neb treatment as needed. Will discharge on prednisone taper dose.     Chronic loculated right pleural effusion -   Started on IV Zosyn  Will discharge on augmentin.     Paroxysmal atrial fib -   rate controlled.  Continue on Eliquis.     Chronic kidney disease -   Monitored renal function.     Activity: Activity as tolerated    Diet: Cardiac Diet    Follow-up: PCP    Disposition: home    Minutes spent on discharge: 45       Labs: Results:       Chemistry Recent Labs     03/22/22  0025 03/21/22  0125 03/20/22  0205   * 159* 163*    133* 132*   K 3.4* 4.3 4.1    104 103   CO2 24 21 19*   BUN 68* 63* 43*   CREA 2.04* 1.82* 1.54*   CA 8.0* 8.7 8.9   AGAP 9 8 10   BUCR 33* 35* 28*   * 154* 155*   TP 5.6* 6.5 7.0   ALB 2.2* 2.1* 2.2*   GLOB 3.4 4.4* 4.8*   AGRAT 0.6* 0.5* 0.5*      CBC w/Diff Recent Labs     03/22/22  0025 03/21/22  0125 03/20/22  0205   WBC 7.3 12.0 15.6*   RBC 3.14* 3.39* 3.46*   HGB 8.7* 9.3* 9.5*   HCT 27.4* 29.0* 29.9*    421* 403   GRANS 94* 94* 97*   LYMPH 4* 3* 1*   EOS 0 0 0      Cardiac Enzymes No results for input(s): CPK, CKND1, WILLARD in the last 72 hours. No lab exists for component: CKRMB, TROIP   Coagulation No results for input(s): PTP, INR, APTT, INREXT, INREXT in the last 72 hours. Lipid Panel No results found for: CHOL, CHOLPOCT, CHOLX, CHLST, CHOLV, 641508, HDL, HDLP, LDL, LDLC, DLDLP, 220931, VLDLC, VLDL, TGLX, TRIGL, TRIGP, TGLPOCT, CHHD, CHHDX   BNP No results for input(s): BNPP in the last 72 hours. Liver Enzymes Recent Labs     03/22/22  0025   TP 5.6*   ALB 2.2*   *      Thyroid Studies Lab Results   Component Value Date/Time    TSH 3.06 04/05/2021 01:30 PM            Significant Diagnostic Studies: XR CHEST PORT    Result Date: 3/19/2022  EXAM:  AP Portable Chest X-ray 1 view INDICATION: Shortness of breath COMPARISON: February 3, 2022 _______________ FINDINGS:  Heart and mediastinal contours are within normal limits for portable radiograph. Stable interstitial infiltrate right lung base. Moderate to severe emphysema. There are no pleural effusions. There are pleural calcifications seen at the right lung base. No acute osseous findings. ________________      Stable interstitial infiltrate at the right lung base with moderate to severe emphysema and pleural calcifications on the right. No results found for this or any previous visit. Please note that this dictation was completed with [employer], the computer voice recognition software. Quite often unanticipated grammatical, syntax, homophones, and other interpretive errors are inadvertently transcribed by the computer software. Please disregard these errors. Please excuse any errors that have escaped final proofreading.      CC: Jez Blackman MD

## 2022-03-23 ENCOUNTER — PATIENT OUTREACH (OUTPATIENT)
Dept: CASE MANAGEMENT | Age: 77
End: 2022-03-23

## 2022-03-23 NOTE — PROGRESS NOTES
Care Transitions Initial Call    Call within 2 business days of discharge: Yes     Patient: Casey Day Patient : 1945 MRN: [id number]    Last Discharge [address]       Complaint Diagnosis Description Type Department Provider    3/19/22 Shortness of Breath Acute exacerbation of chronic obstructive pulmonary disease (COPD) ([facility] ED to Hosp-Admission (Discharged) (ADMIT) [id number] Escobedo MD; Clarence Meléndez. .. Was this an external facility discharge? No Discharge Facility: n/a    CTN Attempt to contact patient via telephone on 3/23/22 for post hospital  follow up. Unable to reach. Left message on voicemail with office contact information. No Patient medical information left on message.

## 2022-03-24 ENCOUNTER — PATIENT OUTREACH (OUTPATIENT)
Dept: CASE MANAGEMENT | Age: 77
End: 2022-03-24

## 2022-03-24 NOTE — PROGRESS NOTES
Care Transitions Initial Call    Call within 2 business days of discharge: Yes     Patient: Ted Severino Patient : 1945 MRN: [id number]    Last Discharge [address]       Complaint Diagnosis Description Type Department Provider    3/19/22 Shortness of Breath Acute exacerbation of chronic obstructive pulmonary disease (COPD) ([facility] ED to Hosp-Admission (Discharged) (ADMIT) [id number] Escobedo MD; Thea Forbes. .. Was this an external facility discharge? No Discharge Facility: n/a    CTN Attempt to contact patient via telephone on 3/24/22 for post hospital  follow up. Unable to reach. Left message on voicemail with office contact information. No Patient medical information left on message. I have been unable to reach Patient on phone. This episode is closed and resolved.

## 2022-03-28 NOTE — PROGRESS NOTES
Physician Progress Note      Rhea Alfaro  CSN #:                  [id number]  :                       1945  ADMIT DATE:       3/19/2022 7:11 PM  [address] [place] DATE:        3/22/2022 5:00 PM  RESPONDING  PROVIDER #:        Heather Marcos MD          QUERY TEXT:    Patient was admitted with COPD exacerbation with associated wheezing and also has a history of asthma. This admission patient was treated with Solumedrol, Duonebs, Brovana & Pulmicort. Wheezing improved by discharge. Based on clinical indicators and treatment, can the patient's respiratory condition be further specified as: The medical record reflects the following:  Risk Factors: Hx asthma, wheezing, copd  Clinical Indicators: SOB, wheezing, : He has diffuse, faint, scattered expiratory wheezes. , COPD  Treatment: with Solumedrol, Duonebs,Brovana & Pulmicort. Thank You  Azael Salinas RN CDI CRCR  Options provided:  -- COPD exacerbation  -- Asthma exacerbation  -- Other - I will add my own diagnosis  -- Disagree - Not applicable / Not valid  -- Disagree - Clinically unable to determine / Unknown  -- Refer to Clinical Documentation Reviewer    PROVIDER RESPONSE TEXT:    This patient is being treated for COPD exacerbation.     Query created by: Marzena Wiseman on 3/25/2022 9:32 AM      Electronically signed by:  Heather Marcos MD 3/27/2022 8:32 PM

## 2022-03-30 NOTE — ROUTINE PROCESS
TRANSFER - IN REPORT:    Verbal report received from CHRIS Cruz RN(name) on [place]  being received from ED(unit) for routine progression of care      Report consisted of patients Situation, Background, Assessment and   Recommendations(SBAR). Information from the following report(s) SBAR, ED Summary, Intake/Output and MAR was reviewed with the receiving nurse. Opportunity for questions and clarification was provided. Assessment completed upon patients arrival to unit and care assumed. verbal cues/1 person assist

## 2022-04-01 ENCOUNTER — APPOINTMENT (OUTPATIENT)
Dept: CT IMAGING | Age: 77
DRG: 191 | End: 2022-04-01
Attending: EMERGENCY MEDICINE
Payer: MEDICARE

## 2022-04-01 ENCOUNTER — APPOINTMENT (OUTPATIENT)
Dept: GENERAL RADIOLOGY | Age: 77
DRG: 191 | End: 2022-04-01
Attending: EMERGENCY MEDICINE
Payer: MEDICARE

## 2022-04-01 ENCOUNTER — HOSPITAL ENCOUNTER (INPATIENT)
Age: 77
LOS: 2 days | Discharge: HOME | DRG: 191 | End: 2022-04-03
Attending: EMERGENCY MEDICINE | Admitting: FAMILY MEDICINE
Payer: MEDICARE

## 2022-04-01 DIAGNOSIS — R10.13 ABDOMINAL PAIN, EPIGASTRIC: ICD-10-CM

## 2022-04-01 DIAGNOSIS — J44.1 ACUTE EXACERBATION OF CHRONIC OBSTRUCTIVE PULMONARY DISEASE (COPD) (HCC): Primary | ICD-10-CM

## 2022-04-01 LAB
ALBUMIN SERPL-MCNC: 2.7 G/DL (ref 3.4–5)
ALBUMIN/GLOB SERPL: 0.8 {RATIO} (ref 0.8–1.7)
ALP SERPL-CCNC: 106 U/L (ref 45–117)
ALT SERPL-CCNC: 11 U/L (ref 16–61)
ANION GAP BLD CALC-SCNC: 11 MMOL/L (ref 10–20)
ANION GAP SERPL CALC-SCNC: 8 MMOL/L (ref 3–18)
ARTERIAL PATENCY WRIST A: POSITIVE
AST SERPL-CCNC: 14 U/L (ref 10–38)
BASE EXCESS BLD CALC-SCNC: 3.4 MMOL/L
BASE EXCESS BLD CALC-SCNC: 5.3 MMOL/L
BASOPHILS # BLD: 0.1 K/UL (ref 0–0.1)
BASOPHILS NFR BLD: 0 % (ref 0–2)
BDY SITE: ABNORMAL
BILIRUB SERPL-MCNC: 0.4 MG/DL (ref 0.2–1)
BNP SERPL-MCNC: 1966 PG/ML (ref 0–1800)
BUN SERPL-MCNC: 23 MG/DL (ref 7–18)
BUN/CREAT SERPL: 19 (ref 12–20)
CA-I BLD-MCNC: 0.9 MMOL/L (ref 1.12–1.32)
CALCIUM SERPL-MCNC: 8.7 MG/DL (ref 8.5–10.1)
CHLORIDE BLD-SCNC: 93 MMOL/L (ref 98–107)
CHLORIDE SERPL-SCNC: 94 MMOL/L (ref 100–111)
CO2 BLD-SCNC: 26 MMOL/L (ref 19–24)
CO2 SERPL-SCNC: 31 MMOL/L (ref 21–32)
CREAT BLD-MCNC: 1.32 MG/DL (ref 0.6–1.3)
CREAT SERPL-MCNC: 1.2 MG/DL (ref 0.6–1.3)
DIFFERENTIAL METHOD BLD: ABNORMAL
EOSINOPHIL # BLD: 0.1 K/UL (ref 0–0.4)
EOSINOPHIL NFR BLD: 0 % (ref 0–5)
ERYTHROCYTE [DISTWIDTH] IN BLOOD BY AUTOMATED COUNT: 13.7 % (ref 11.6–14.5)
GAS FLOW.O2 O2 DELIVERY SYS: ABNORMAL L/MIN
GAS FLOW.O2 SETTING OXYMISER: 18 BPM
GLOBULIN SER CALC-MCNC: 3.6 G/DL (ref 2–4)
GLUCOSE BLD-MCNC: 100 MG/DL (ref 65–100)
GLUCOSE SERPL-MCNC: 109 MG/DL (ref 74–99)
HCO3 BLD-SCNC: 25.7 MMOL/L (ref 22–26)
HCO3 BLD-SCNC: 28.2 MMOL/L (ref 22–26)
HCT VFR BLD AUTO: 30.9 % (ref 36–48)
HGB BLD-MCNC: 10.2 G/DL (ref 13–16)
IMM GRANULOCYTES # BLD AUTO: 0.1 K/UL (ref 0–0.04)
IMM GRANULOCYTES NFR BLD AUTO: 1 % (ref 0–0.5)
LACTATE BLD-SCNC: 1.75 MMOL/L (ref 0.4–2)
LIPASE SERPL-CCNC: 87 U/L (ref 73–393)
LYMPHOCYTES # BLD: 2.1 K/UL (ref 0.9–3.6)
LYMPHOCYTES NFR BLD: 13 % (ref 21–52)
MCH RBC QN AUTO: 27.1 PG (ref 24–34)
MCHC RBC AUTO-ENTMCNC: 33 G/DL (ref 31–37)
MCV RBC AUTO: 82.2 FL (ref 78–100)
MONOCYTES # BLD: 1.2 K/UL (ref 0.05–1.2)
MONOCYTES NFR BLD: 7 % (ref 3–10)
NEUTS SEG # BLD: 12.6 K/UL (ref 1.8–8)
NEUTS SEG NFR BLD: 79 % (ref 40–73)
NRBC # BLD: 0 K/UL (ref 0–0.01)
NRBC BLD-RTO: 0 PER 100 WBC
O2/TOTAL GAS SETTING VFR VENT: 28 %
PCO2 BLD: 43.7 MMHG (ref 35–45)
PCO2 BLDV: 24.7 MMHG (ref 41–51)
PH BLD: 7.42 [PH] (ref 7.35–7.45)
PH BLDV: 7.63 [PH] (ref 7.32–7.42)
PLATELET # BLD AUTO: 550 K/UL (ref 135–420)
PMV BLD AUTO: 8.7 FL (ref 9.2–11.8)
PO2 BLD: 101 MMHG (ref 80–100)
PO2 BLDV: 23 MMHG (ref 25–40)
POTASSIUM BLD-SCNC: 5.3 MMOL/L (ref 3.5–5.1)
POTASSIUM SERPL-SCNC: 4.2 MMOL/L (ref 3.5–5.5)
PROT SERPL-MCNC: 6.3 G/DL (ref 6.4–8.2)
RBC # BLD AUTO: 3.76 M/UL (ref 4.35–5.65)
SAO2 % BLD: 55 %
SAO2 % BLD: 97.9 % (ref 92–97)
SERVICE CMNT-IMP: ABNORMAL
SERVICE CMNT-IMP: ABNORMAL
SODIUM BLD-SCNC: 129 MMOL/L (ref 136–145)
SODIUM SERPL-SCNC: 133 MMOL/L (ref 136–145)
SPECIMEN SITE: ABNORMAL
SPECIMEN TYPE: ABNORMAL
TROPONIN-HIGH SENSITIVITY: 6 NG/L (ref 0–78)
WBC # BLD AUTO: 16 K/UL (ref 4.6–13.2)

## 2022-04-01 PROCEDURE — 82803 BLOOD GASES ANY COMBINATION: CPT

## 2022-04-01 PROCEDURE — 65270000029 HC RM PRIVATE

## 2022-04-01 PROCEDURE — 74176 CT ABD & PELVIS W/O CONTRAST: CPT

## 2022-04-01 PROCEDURE — 80053 COMPREHEN METABOLIC PANEL: CPT

## 2022-04-01 PROCEDURE — 83880 ASSAY OF NATRIURETIC PEPTIDE: CPT

## 2022-04-01 PROCEDURE — 96374 THER/PROPH/DIAG INJ IV PUSH: CPT

## 2022-04-01 PROCEDURE — 36600 WITHDRAWAL OF ARTERIAL BLOOD: CPT

## 2022-04-01 PROCEDURE — 94761 N-INVAS EAR/PLS OXIMETRY MLT: CPT

## 2022-04-01 PROCEDURE — 84484 ASSAY OF TROPONIN QUANT: CPT

## 2022-04-01 PROCEDURE — 71045 X-RAY EXAM CHEST 1 VIEW: CPT

## 2022-04-01 PROCEDURE — 93005 ELECTROCARDIOGRAM TRACING: CPT

## 2022-04-01 PROCEDURE — 86677 HELICOBACTER PYLORI ANTIBODY: CPT

## 2022-04-01 PROCEDURE — 74011250636 HC RX REV CODE- 250/636: Performed by: EMERGENCY MEDICINE

## 2022-04-01 PROCEDURE — 83690 ASSAY OF LIPASE: CPT

## 2022-04-01 PROCEDURE — 99285 EMERGENCY DEPT VISIT HI MDM: CPT

## 2022-04-01 PROCEDURE — 85025 COMPLETE CBC W/AUTO DIFF WBC: CPT

## 2022-04-01 PROCEDURE — 96375 TX/PRO/DX INJ NEW DRUG ADDON: CPT

## 2022-04-01 PROCEDURE — 82947 ASSAY GLUCOSE BLOOD QUANT: CPT

## 2022-04-01 RX ORDER — SODIUM CHLORIDE 0.9 % (FLUSH) 0.9 %
5-40 SYRINGE (ML) INJECTION AS NEEDED
Status: DISCONTINUED | OUTPATIENT
Start: 2022-04-01 | End: 2022-04-03 | Stop reason: HOSPADM

## 2022-04-01 RX ORDER — ACETAMINOPHEN 325 MG/1
650 TABLET ORAL
Status: DISCONTINUED | OUTPATIENT
Start: 2022-04-01 | End: 2022-04-03 | Stop reason: HOSPADM

## 2022-04-01 RX ORDER — ONDANSETRON 4 MG/1
4 TABLET, ORALLY DISINTEGRATING ORAL
Status: DISCONTINUED | OUTPATIENT
Start: 2022-04-01 | End: 2022-04-03 | Stop reason: HOSPADM

## 2022-04-01 RX ORDER — POLYETHYLENE GLYCOL 3350 17 G/17G
17 POWDER, FOR SOLUTION ORAL DAILY PRN
Status: DISCONTINUED | OUTPATIENT
Start: 2022-04-01 | End: 2022-04-03 | Stop reason: HOSPADM

## 2022-04-01 RX ORDER — MORPHINE SULFATE 4 MG/ML
4 INJECTION INTRAVENOUS
Status: COMPLETED | OUTPATIENT
Start: 2022-04-01 | End: 2022-04-01

## 2022-04-01 RX ORDER — ACETAMINOPHEN 650 MG/1
650 SUPPOSITORY RECTAL
Status: DISCONTINUED | OUTPATIENT
Start: 2022-04-01 | End: 2022-04-03 | Stop reason: HOSPADM

## 2022-04-01 RX ORDER — IPRATROPIUM BROMIDE AND ALBUTEROL SULFATE 2.5; .5 MG/3ML; MG/3ML
3 SOLUTION RESPIRATORY (INHALATION)
Status: DISCONTINUED | OUTPATIENT
Start: 2022-04-02 | End: 2022-04-03 | Stop reason: HOSPADM

## 2022-04-01 RX ORDER — BUDESONIDE 0.5 MG/2ML
500 INHALANT ORAL
Status: DISCONTINUED | OUTPATIENT
Start: 2022-04-01 | End: 2022-04-03 | Stop reason: HOSPADM

## 2022-04-01 RX ORDER — ARFORMOTEROL TARTRATE 15 UG/2ML
15 SOLUTION RESPIRATORY (INHALATION)
Status: DISCONTINUED | OUTPATIENT
Start: 2022-04-01 | End: 2022-04-03 | Stop reason: HOSPADM

## 2022-04-01 RX ORDER — ONDANSETRON 2 MG/ML
4 INJECTION INTRAMUSCULAR; INTRAVENOUS
Status: COMPLETED | OUTPATIENT
Start: 2022-04-01 | End: 2022-04-01

## 2022-04-01 RX ORDER — ONDANSETRON 2 MG/ML
4 INJECTION INTRAMUSCULAR; INTRAVENOUS
Status: DISCONTINUED | OUTPATIENT
Start: 2022-04-01 | End: 2022-04-03 | Stop reason: HOSPADM

## 2022-04-01 RX ORDER — SODIUM CHLORIDE 0.9 % (FLUSH) 0.9 %
5-40 SYRINGE (ML) INJECTION EVERY 8 HOURS
Status: DISCONTINUED | OUTPATIENT
Start: 2022-04-01 | End: 2022-04-03 | Stop reason: HOSPADM

## 2022-04-01 RX ADMIN — MORPHINE SULFATE 4 MG: 4 INJECTION INTRAVENOUS at 21:05

## 2022-04-01 RX ADMIN — ONDANSETRON 4 MG: 2 INJECTION INTRAMUSCULAR; INTRAVENOUS at 21:05

## 2022-04-01 RX ADMIN — SODIUM CHLORIDE 1000 ML: 9 INJECTION, SOLUTION INTRAVENOUS at 21:08

## 2022-04-02 PROBLEM — K29.90 GASTRITIS AND DUODENITIS: Status: ACTIVE | Noted: 2022-04-02

## 2022-04-02 PROBLEM — Z78.9 ON SUPPLEMENTAL OXYGEN BY NASAL CANNULA: Status: ACTIVE | Noted: 2022-04-02

## 2022-04-02 PROBLEM — N32.89 BLADDER WALL THICKENING: Status: ACTIVE | Noted: 2022-04-02

## 2022-04-02 LAB
ALBUMIN SERPL-MCNC: 2.2 G/DL (ref 3.4–5)
ALBUMIN/GLOB SERPL: 0.8 {RATIO} (ref 0.8–1.7)
ALP SERPL-CCNC: 85 U/L (ref 45–117)
ALT SERPL-CCNC: 8 U/L (ref 16–61)
ANION GAP SERPL CALC-SCNC: 7 MMOL/L (ref 3–18)
AST SERPL-CCNC: 6 U/L (ref 10–38)
ATRIAL RATE: 96 BPM
BILIRUB SERPL-MCNC: 0.3 MG/DL (ref 0.2–1)
BUN SERPL-MCNC: 25 MG/DL (ref 7–18)
BUN/CREAT SERPL: 20 (ref 12–20)
CALCIUM SERPL-MCNC: 7.9 MG/DL (ref 8.5–10.1)
CALCULATED P AXIS, ECG09: 79 DEGREES
CALCULATED R AXIS, ECG10: 23 DEGREES
CALCULATED T AXIS, ECG11: 73 DEGREES
CHLORIDE SERPL-SCNC: 100 MMOL/L (ref 100–111)
CHOLEST SERPL-MCNC: 172 MG/DL
CO2 SERPL-SCNC: 29 MMOL/L (ref 21–32)
CREAT SERPL-MCNC: 1.28 MG/DL (ref 0.6–1.3)
DIAGNOSIS, 93000: NORMAL
ERYTHROCYTE [DISTWIDTH] IN BLOOD BY AUTOMATED COUNT: 13.8 % (ref 11.6–14.5)
GLOBULIN SER CALC-MCNC: 2.8 G/DL (ref 2–4)
GLUCOSE BLD STRIP.AUTO-MCNC: 195 MG/DL (ref 70–110)
GLUCOSE SERPL-MCNC: 141 MG/DL (ref 74–99)
HBA1C MFR BLD: 5.5 % (ref 4.2–5.6)
HCT VFR BLD AUTO: 27.1 % (ref 36–48)
HDLC SERPL-MCNC: 39 MG/DL (ref 40–60)
HDLC SERPL: 4.4 {RATIO} (ref 0–5)
HGB BLD-MCNC: 8.8 G/DL (ref 13–16)
LDLC SERPL CALC-MCNC: 109.2 MG/DL (ref 0–100)
LIPID PROFILE,FLP: ABNORMAL
MCH RBC QN AUTO: 27.7 PG (ref 24–34)
MCHC RBC AUTO-ENTMCNC: 32.5 G/DL (ref 31–37)
MCV RBC AUTO: 85.2 FL (ref 78–100)
NRBC # BLD: 0 K/UL (ref 0–0.01)
NRBC BLD-RTO: 0 PER 100 WBC
P-R INTERVAL, ECG05: 144 MS
PLATELET # BLD AUTO: 406 K/UL (ref 135–420)
PMV BLD AUTO: 9 FL (ref 9.2–11.8)
POTASSIUM SERPL-SCNC: 3.9 MMOL/L (ref 3.5–5.5)
PROT SERPL-MCNC: 5 G/DL (ref 6.4–8.2)
Q-T INTERVAL, ECG07: 364 MS
QRS DURATION, ECG06: 76 MS
QTC CALCULATION (BEZET), ECG08: 459 MS
RBC # BLD AUTO: 3.18 M/UL (ref 4.35–5.65)
SODIUM SERPL-SCNC: 136 MMOL/L (ref 136–145)
TRIGL SERPL-MCNC: 119 MG/DL (ref ?–150)
VENTRICULAR RATE, ECG03: 96 BPM
VLDLC SERPL CALC-MCNC: 23.8 MG/DL
WBC # BLD AUTO: 12.1 K/UL (ref 4.6–13.2)

## 2022-04-02 PROCEDURE — 83036 HEMOGLOBIN GLYCOSYLATED A1C: CPT

## 2022-04-02 PROCEDURE — 74011250637 HC RX REV CODE- 250/637: Performed by: HOSPITALIST

## 2022-04-02 PROCEDURE — 77010033678 HC OXYGEN DAILY

## 2022-04-02 PROCEDURE — 74011250636 HC RX REV CODE- 250/636: Performed by: FAMILY MEDICINE

## 2022-04-02 PROCEDURE — C9113 INJ PANTOPRAZOLE SODIUM, VIA: HCPCS | Performed by: EMERGENCY MEDICINE

## 2022-04-02 PROCEDURE — 94640 AIRWAY INHALATION TREATMENT: CPT

## 2022-04-02 PROCEDURE — 74011000250 HC RX REV CODE- 250: Performed by: FAMILY MEDICINE

## 2022-04-02 PROCEDURE — 74011250636 HC RX REV CODE- 250/636: Performed by: HOSPITALIST

## 2022-04-02 PROCEDURE — 65660000000 HC RM CCU STEPDOWN

## 2022-04-02 PROCEDURE — C9113 INJ PANTOPRAZOLE SODIUM, VIA: HCPCS | Performed by: FAMILY MEDICINE

## 2022-04-02 PROCEDURE — 85027 COMPLETE CBC AUTOMATED: CPT

## 2022-04-02 PROCEDURE — 74011250636 HC RX REV CODE- 250/636: Performed by: EMERGENCY MEDICINE

## 2022-04-02 PROCEDURE — 80061 LIPID PANEL: CPT

## 2022-04-02 PROCEDURE — 74011000250 HC RX REV CODE- 250: Performed by: EMERGENCY MEDICINE

## 2022-04-02 PROCEDURE — 80053 COMPREHEN METABOLIC PANEL: CPT

## 2022-04-02 PROCEDURE — 36415 COLL VENOUS BLD VENIPUNCTURE: CPT

## 2022-04-02 PROCEDURE — 74011250637 HC RX REV CODE- 250/637: Performed by: FAMILY MEDICINE

## 2022-04-02 PROCEDURE — 82962 GLUCOSE BLOOD TEST: CPT

## 2022-04-02 RX ORDER — GABAPENTIN 100 MG/1
100 CAPSULE ORAL 3 TIMES DAILY
Status: DISCONTINUED | OUTPATIENT
Start: 2022-04-02 | End: 2022-04-03 | Stop reason: HOSPADM

## 2022-04-02 RX ORDER — FUROSEMIDE 20 MG/1
20 TABLET ORAL DAILY
Status: DISCONTINUED | OUTPATIENT
Start: 2022-04-02 | End: 2022-04-03 | Stop reason: HOSPADM

## 2022-04-02 RX ORDER — TAMSULOSIN HYDROCHLORIDE 0.4 MG/1
0.4 CAPSULE ORAL EVERY EVENING
Status: DISCONTINUED | OUTPATIENT
Start: 2022-04-02 | End: 2022-04-03 | Stop reason: HOSPADM

## 2022-04-02 RX ORDER — TRAMADOL HYDROCHLORIDE 50 MG/1
50 TABLET ORAL
Status: DISCONTINUED | OUTPATIENT
Start: 2022-04-02 | End: 2022-04-03 | Stop reason: HOSPADM

## 2022-04-02 RX ORDER — CHLORTHALIDONE 25 MG/1
25 TABLET ORAL DAILY
Status: DISCONTINUED | OUTPATIENT
Start: 2022-04-02 | End: 2022-04-03 | Stop reason: HOSPADM

## 2022-04-02 RX ORDER — POLYVINYL ALCOHOL 14 MG/ML
SOLUTION/ DROPS OPHTHALMIC
Status: DISCONTINUED | OUTPATIENT
Start: 2022-04-02 | End: 2022-04-03 | Stop reason: HOSPADM

## 2022-04-02 RX ORDER — DOCUSATE SODIUM 100 MG/1
100 CAPSULE, LIQUID FILLED ORAL 2 TIMES DAILY
Status: DISCONTINUED | OUTPATIENT
Start: 2022-04-02 | End: 2022-04-03 | Stop reason: HOSPADM

## 2022-04-02 RX ORDER — AMLODIPINE BESYLATE 5 MG/1
5 TABLET ORAL DAILY
Status: DISCONTINUED | OUTPATIENT
Start: 2022-04-02 | End: 2022-04-03 | Stop reason: HOSPADM

## 2022-04-02 RX ORDER — LANOLIN ALCOHOL/MO/W.PET/CERES
1 CREAM (GRAM) TOPICAL
Status: DISCONTINUED | OUTPATIENT
Start: 2022-04-03 | End: 2022-04-03 | Stop reason: HOSPADM

## 2022-04-02 RX ADMIN — IPRATROPIUM BROMIDE AND ALBUTEROL SULFATE 3 ML: .5; 3 SOLUTION RESPIRATORY (INHALATION) at 04:30

## 2022-04-02 RX ADMIN — SODIUM CHLORIDE, PRESERVATIVE FREE 10 ML: 5 INJECTION INTRAVENOUS at 00:35

## 2022-04-02 RX ADMIN — CHLORTHALIDONE 25 MG: 25 TABLET ORAL at 08:43

## 2022-04-02 RX ADMIN — GABAPENTIN 100 MG: 100 CAPSULE ORAL at 08:43

## 2022-04-02 RX ADMIN — METHYLPREDNISOLONE SODIUM SUCCINATE 125 MG: 40 INJECTION, POWDER, FOR SOLUTION INTRAMUSCULAR; INTRAVENOUS at 05:35

## 2022-04-02 RX ADMIN — BUDESONIDE 500 MCG: 0.5 INHALANT RESPIRATORY (INHALATION) at 19:38

## 2022-04-02 RX ADMIN — METHYLPREDNISOLONE SODIUM SUCCINATE 125 MG: 40 INJECTION, POWDER, FOR SOLUTION INTRAMUSCULAR; INTRAVENOUS at 02:24

## 2022-04-02 RX ADMIN — SODIUM CHLORIDE, PRESERVATIVE FREE 10 ML: 5 INJECTION INTRAVENOUS at 05:35

## 2022-04-02 RX ADMIN — APIXABAN 5 MG: 5 TABLET, FILM COATED ORAL at 21:43

## 2022-04-02 RX ADMIN — METHYLPREDNISOLONE SODIUM SUCCINATE 40 MG: 40 INJECTION, POWDER, FOR SOLUTION INTRAMUSCULAR; INTRAVENOUS at 17:13

## 2022-04-02 RX ADMIN — TAMSULOSIN HYDROCHLORIDE 0.4 MG: 0.4 CAPSULE ORAL at 17:13

## 2022-04-02 RX ADMIN — SODIUM CHLORIDE 40 MG: 9 INJECTION, SOLUTION INTRAMUSCULAR; INTRAVENOUS; SUBCUTANEOUS at 08:43

## 2022-04-02 RX ADMIN — GABAPENTIN 100 MG: 100 CAPSULE ORAL at 21:43

## 2022-04-02 RX ADMIN — IPRATROPIUM BROMIDE AND ALBUTEROL SULFATE 3 ML: .5; 3 SOLUTION RESPIRATORY (INHALATION) at 07:12

## 2022-04-02 RX ADMIN — GABAPENTIN 100 MG: 100 CAPSULE ORAL at 17:13

## 2022-04-02 RX ADMIN — ARFORMOTEROL TARTRATE 15 MCG: 15 SOLUTION RESPIRATORY (INHALATION) at 19:38

## 2022-04-02 RX ADMIN — DOCUSATE SODIUM 100 MG: 100 CAPSULE ORAL at 21:43

## 2022-04-02 RX ADMIN — ARFORMOTEROL TARTRATE 15 MCG: 15 SOLUTION RESPIRATORY (INHALATION) at 00:34

## 2022-04-02 RX ADMIN — AZITHROMYCIN MONOHYDRATE 500 MG: 500 INJECTION, POWDER, LYOPHILIZED, FOR SOLUTION INTRAVENOUS at 00:35

## 2022-04-02 RX ADMIN — APIXABAN 5 MG: 5 TABLET, FILM COATED ORAL at 08:43

## 2022-04-02 RX ADMIN — ARFORMOTEROL TARTRATE 15 MCG: 15 SOLUTION RESPIRATORY (INHALATION) at 07:12

## 2022-04-02 RX ADMIN — METHYLPREDNISOLONE SODIUM SUCCINATE 40 MG: 40 INJECTION, POWDER, FOR SOLUTION INTRAMUSCULAR; INTRAVENOUS at 00:33

## 2022-04-02 RX ADMIN — FUROSEMIDE 20 MG: 20 TABLET ORAL at 08:43

## 2022-04-02 RX ADMIN — SODIUM CHLORIDE, PRESERVATIVE FREE 10 ML: 5 INJECTION INTRAVENOUS at 22:57

## 2022-04-02 RX ADMIN — IPRATROPIUM BROMIDE AND ALBUTEROL SULFATE 3 ML: .5; 3 SOLUTION RESPIRATORY (INHALATION) at 00:35

## 2022-04-02 RX ADMIN — IPRATROPIUM BROMIDE AND ALBUTEROL SULFATE 3 ML: .5; 3 SOLUTION RESPIRATORY (INHALATION) at 11:11

## 2022-04-02 RX ADMIN — SODIUM CHLORIDE 40 MG: 9 INJECTION, SOLUTION INTRAMUSCULAR; INTRAVENOUS; SUBCUTANEOUS at 00:29

## 2022-04-02 RX ADMIN — AMLODIPINE BESYLATE 5 MG: 5 TABLET ORAL at 08:43

## 2022-04-02 RX ADMIN — IPRATROPIUM BROMIDE AND ALBUTEROL SULFATE 3 ML: .5; 3 SOLUTION RESPIRATORY (INHALATION) at 15:23

## 2022-04-02 RX ADMIN — BUDESONIDE 500 MCG: 0.5 INHALANT RESPIRATORY (INHALATION) at 07:11

## 2022-04-02 RX ADMIN — BUDESONIDE 500 MCG: 0.5 INHALANT RESPIRATORY (INHALATION) at 00:35

## 2022-04-02 RX ADMIN — IPRATROPIUM BROMIDE AND ALBUTEROL SULFATE 3 ML: .5; 3 SOLUTION RESPIRATORY (INHALATION) at 19:38

## 2022-04-02 NOTE — H&P
History & Physical    Patient: Fabiola Lind MRN: [id number]  [facility]: [id number]    YOB: 1945  Age: 64 y.o. Sex: male      DOA: 4/1/2022  Primary Care Provider:  Britney Mcclure MD      Assessment/Plan     Patient Active Problem List   Diagnosis Code    Hypertension I10    Chronic obstructive pulmonary disease ([facility]) J44.9    Chronic renal disease, stage 3, moderately decreased glomerular filtration rate between 30-59 mL/min/1.73 square meter ([facility]) N18.30    Age-related physical debility R54    Acute on chronic respiratory failure with hypoxemia ([facility]) J96.21    PAF (paroxysmal atrial fibrillation) ([facility]) I48.0    Avascular necrosis of bone of left hip ([facility]) M87.052    Anemia D64.9    COPD with acute exacerbation ([facility]) J44.1    Acute exacerbation of chronic obstructive pulmonary disease (COPD) ([facility]) J44.1    Loculated pleural effusion J90    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     Acute COPD exacerbation with recent treatment for loculated pleural effusion on 2 L home oxygen  -Pulmicort and Brovana nebulizers  -DuoNebs every 4 hours  -Solu-Medrol 125 mg every 8 hours  -Azithromycin 500 mg IV daily    Gastritis and duodenitis-likely precipitated by frequent steroid use for COPD  --Check occult blood of the stool   --GI cocktail as needed  --Follow up H. pylori studies  -Protonix IV  -GI consult for EGD    Bladder wall thickening-hematuria likely due to eliquis  --Follow up urine culture  --Consider outpatient urology evaluation    Atrial fibrillation on anticoagulation  -Continue Eliquis  --Monitor CBC and for signs of bleeding    Hypertension  -Continue home medications    CKD stage III-improved creatinine from baseline  -Avoid nephrotoxins    Bilateral deafness-very hard of hearing, risk factor for delirium    BMI 30.4  --Lifestyle modification needed  --Follow up hemoglobin A1C and lipid panel    DNR/DNI-on file and confirmed again by the patient    Prophylaxis: protonix IV, Eliquis p.o. Estimated length of stay : 3 nights    Balbir Lucero MD  Nocturnist  ----------------------------------------------------------------------------------------------------------------------------------------------------------------------------------------------------------------  CC: shortness of breath, abdominal pain       HPI:     Althea Fernandes is a 64 y.o. male with COPD on 2 L home oxygen, hypertension, paroxysmal atrial fibrillation, and anticoagulated for prior thromboembolus presents via EMS tonight for shortness of breath and abdominal pain. History is mostly unobtainable due to extreme difficulty hearing. Per EMS report, he was using water in his nebulizer. He does not take ASA or NSAIDs. He denies nausea/vomiting or fever.     Past Medical History:   Diagnosis Date    BMI 30.0-30.9,adult 4/2/2022    Brain aneurysm     Brain aneurysm     Cancer [facility])     prostate    CHF (congestive heart failure) (HCC)     CKD (chronic kidney disease)     Closed nondisplaced supracondylar fracture of lower end of left femur without intracondylar extension with delayed healing     COPD (chronic obstructive pulmonary disease) ([address]     Debility     History of home oxygen therapy     [place] (hard of hearing)     Hypertension     Nocturia     On home oxygen therapy     PAF (paroxysmal atrial fibrillation) ([address]     Pneumonia     Prostate CA [facility]     Prostate neoplasm     Radiation effect        Past Surgical History:   Procedure Laterality Date    HX HERNIA REPAIR      HX HIP ARTHROSCOPY  04/09/2021       Family History   Problem Relation Age of Onset    Heart Disease Mother        Social History     Socioeconomic History    Marital status:    Tobacco Use    Smoking status: Former Smoker     Types: Cigarettes    Smokeless tobacco: Never Used   Substance and Sexual Activity    Alcohol use: No    Drug use: Never       Prior to Admission medications    Medication Sig Start Date End Date Taking? Authorizing Provider   predniSONE (STERAPRED DS) 10 mg dose pack See administration instruction per 10mg dose pack 3/22/22   Farhan Tavarez MD   chlorthalidone (HYGROTON) 25 mg tablet Take 25 mg by mouth daily. Provider, Historical   traMADoL (ULTRAM) 50 mg tablet Take 50 mg by mouth every eight (8) hours as needed for Pain. Provider, Historical   gabapentin (NEURONTIN) 100 mg capsule Take 100 mg by mouth three (3) times daily. Provider, Historical   apixaban (ELIQUIS) 5 mg tablet Take 1 Tablet by mouth two (2) times a day. 10/7/21   Farhan Tavarez MD   collagenase (SANTYL) 250 unit/gram ointment Apply  to affected area daily. 6/23/21   Farhan Tavarez MD   arformoteroL (BROVANA) 15 mcg/2 mL nebu neb solution 2 mL by Nebulization route two (2) times a day. 4/14/21   Adrian Reese MD   budesonide (PULMICORT) 0.5 mg/2 mL nbsp 2 mL by Nebulization route two (2) times a day. 4/14/21   Adrian Reese MD   famotidine (PEPCID) 20 mg tablet Take 1 Tab by mouth daily. 4/14/21   Adrian Reese MD   ferrous sulfate 325 mg (65 mg iron) tablet Take 1 Tab by mouth two (2) times daily (with meals). 4/14/21   Adrian Reese MD   amLODIPine (NORVASC) 5 mg tablet Take 1 Tab by mouth daily.  3/3/21   Elle Zapata MD   albuterol (PROVENTIL HFA, VENTOLIN HFA, PROAIR HFA) 90 mcg/actuation inhaler Take 2 Puffs by inhalation every four (4) hours as needed for Wheezing or Shortness of Breath. 21   Sabine Jordan MD   OXYGEN-AIR DELIVERY SYSTEMS 2 L/min by Nasal route continuous. Provider, Historical   furosemide (Lasix) 20 mg tablet Take 20 mg by mouth daily. Provider, Historical   dextran 70/hypromellose (ARTIFICIAL TEARS, PF, OP) Administer 2 Drops to both eyes daily as needed for Other (dry eyes). Provider, Historical   tamsulosin (FLOMAX) 0.4 mg capsule Take 0.4 mg by mouth every evening. Other, MD Blayne       Allergies   Allergen Reactions    Aspirin Other (comments)     \"messes my stomach up\"    Bactrim [Sulfamethoxazole-Trimethoprim] Unknown (comments)       Review of Systems  See HPI, limited by severe hearing impairment    Physical Exam:     Physical Exam:  Visit Vitals  BP (!) 128/52 (BP 1 Location: Right arm, BP Patient Position: Lying)   Pulse 75   Temp 98.2 °F (36.8 °C)   Resp 20   Ht 5' 8\" (1.727 m)   Wt 90.7 kg (200 lb)   SpO2 100%   BMI 30.41 kg/m²    O2 Flow Rate (L/min): 3 l/min O2 Device: Nasal cannula    Temp (24hrs), Av.4 °F (36.9 °C), Min:98.2 °F (36.8 °C), Max:98.6 °F (37 °C)    No intake/output data recorded. No intake/output data recorded. General:  Awake, cooperative, no distress. Head:  Normocephalic, without obvious abnormality, atraumatic. Eyes:  Conjunctivae/corneas clear, sclera anicteric. Neck: Supple, symmetrical, trachea midline. Lungs:   Coarse breath sounds bilaterally throughout all lung fields, +wheezing. Heart:  Regular rate and rhythm, S1, S2 normal, no murmur, click, rub or gallop. Abdomen: Soft, tenderness to palpation in epigastric region w/o rebound tenderness. Bowel sounds normal. No masses,  No organomegaly. Extremities: Extremities normal, atraumatic, no cyanosis or edema. Capillary refill normal.   Pulses: 2+ and symmetric all extremities. Skin: Skin color pink, turgor normal. No rashes or lesions   Neurologic: No focal motor or sensory deficit.        Labs Reviewed: All lab results for the last 24 hours reviewed. Recent Results (from the past 24 hour(s))   CBC WITH AUTOMATED DIFF    Collection Time: 04/01/22  8:00 PM   Result Value Ref Range    WBC 16.0 (H) 4.6 - 13.2 K/uL    RBC 3.76 (L) 4.35 - 5.65 M/uL    HGB 10.2 (L) 13.0 - 16.0 g/dL    HCT 30.9 (L) 36.0 - 48.0 %    MCV 82.2 78.0 - 100.0 FL    MCH 27.1 24.0 - 34.0 PG    MCHC 33.0 31.0 - 37.0 g/dL    RDW 13.7 11.6 - 14.5 %    PLATELET 915 (H) 126 - 420 K/uL    MPV 8.7 (L) 9.2 - 11.8 FL    NRBC 0.0 0  WBC    ABSOLUTE NRBC 0.00 0.00 - 0.01 K/uL    NEUTROPHILS 79 (H) 40 - 73 %    LYMPHOCYTES 13 (L) 21 - 52 %    MONOCYTES 7 3 - 10 %    EOSINOPHILS 0 0 - 5 %    BASOPHILS 0 0 - 2 %    IMMATURE GRANULOCYTES 1 (H) 0.0 - 0.5 %    ABS. NEUTROPHILS 12.6 (H) 1.8 - 8.0 K/UL    ABS. LYMPHOCYTES 2.1 0.9 - 3.6 K/UL    ABS. MONOCYTES 1.2 0.05 - 1.2 K/UL    ABS. EOSINOPHILS 0.1 0.0 - 0.4 K/UL    ABS. BASOPHILS 0.1 0.0 - 0.1 K/UL    ABS. IMM. GRANS. 0.1 (H) 0.00 - 0.04 K/UL    DF AUTOMATED     METABOLIC PANEL, COMPREHENSIVE    Collection Time: 04/01/22  8:00 PM   Result Value Ref Range    Sodium 133 (L) 136 - 145 mmol/L    Potassium 4.2 3.5 - 5.5 mmol/L    Chloride 94 (L) 100 - 111 mmol/L    CO2 31 21 - 32 mmol/L    Anion gap 8 3.0 - 18 mmol/L    Glucose 109 (H) 74 - 99 mg/dL    BUN 23 (H) 7.0 - 18 MG/DL    Creatinine 1.20 0.6 - 1.3 MG/DL    BUN/Creatinine ratio 19 12 - 20      GFR est AA >60 >60 ml/min/1.73m2    GFR est non-AA 59 (L) >60 ml/min/1.73m2    Calcium 8.7 8.5 - 10.1 MG/DL    Bilirubin, total 0.4 0.2 - 1.0 MG/DL    ALT (SGPT) 11 (L) 16 - 61 U/L    AST (SGOT) 14 10 - 38 U/L    Alk.  phosphatase 106 45 - 117 U/L    Protein, total 6.3 (L) 6.4 - 8.2 g/dL    Albumin 2.7 (L) 3.4 - 5.0 g/dL    Globulin 3.6 2.0 - 4.0 g/dL    A-G Ratio 0.8 0.8 - 1.7     NT-PRO BNP    Collection Time: 04/01/22  8:00 PM   Result Value Ref Range    NT pro-BNP 1,966 (H) 0 - 1,800 PG/ML   LIPASE    Collection Time: 04/01/22  8:00 PM   Result Value Ref Range    Lipase 87 73 - 393 U/L   EKG, 12 LEAD, INITIAL    Collection Time: 04/01/22  8:00 PM   Result Value Ref Range    Ventricular Rate 96 BPM    Atrial Rate 96 BPM    P-R Interval 144 ms    QRS Duration 76 ms    Q-T Interval 364 ms    QTC Calculation (Bezet) 459 ms    Calculated P Axis 79 degrees    Calculated R Axis 23 degrees    Calculated T Axis 73 degrees    Diagnosis       Normal sinus rhythm  Normal ECG  When compared with ECG of 19-MAR-2022 19:16,  No significant change was found     BLOOD GAS,CHEM8,LACTIC ACID POC    Collection Time: 04/01/22  8:01 PM   Result Value Ref Range    Calcium, ionized (POC) 0.90 (L) 1.12 - 1.32 mmol/L    Base excess (POC) 5.3 mmol/L    HCO3 (POC) 25.7 22 - 26 MMOL/L    CO2, POC 26 (H) 19 - 24 MMOL/L    O2 SAT 55 %    Sample source VENOUS BLOOD      Performed by [facility]     Sodium (POC) 129 (L) 136 - 145 mmol/L    Potassium (POC) 5.3 (H) 3.5 - 5.1 mmol/L    Glucose (POC) 100 65 - 100 mg/dL    Creatinine (POC) 1.32 (H) 0.6 - 1.3 mg/dL    Lactic Acid (POC) 1.75 0.40 - 2.00 mmol/L    Chloride (POC) 93 (L) 98 - 107 mmol/L    Anion gap, POC 11 10 - 20      GFRAA, POC >60 >60 ml/min/1.73m2    GFRNA, POC 52 (L) >60 ml/min/1.73m2    pH, venous (POC) 7.63 (HH) 7.32 - 7.42      pCO2, venous (POC) 24.7 (L) 41 - 51 MMHG    pO2, venous (POC) 23 (L) 25 - 40 mmHg   TROPONIN-HIGH SENSITIVITY    Collection Time: 04/01/22  8:15 PM   Result Value Ref Range    Troponin-High Sensitivity 6 0 - 78 ng/L   BLOOD GAS, ARTERIAL POC    Collection Time: 04/01/22  9:31 PM   Result Value Ref Range    Device: NASAL CANNULA      FIO2 (POC) 28 %    pH (POC) 7.42 7.35 - 7.45      pCO2 (POC) 43.7 35.0 - 45.0 MMHG    pO2 (POC) 101 (H) 80 - 100 MMHG    HCO3 (POC) 28.2 (H) 22 - 26 MMOL/L    sO2 (POC) 97.9 (H) 92 - 97 %    Base excess (POC) 3.4 mmol/L    Set Rate 18 bpm    Allens test (POC) Positive      Site RIGHT RADIAL      Specimen type (POC) ARTERIAL      Performed by Ezequiel Vega Results  XR CHEST PORT (Accession [id number]) (Order [id number])    Allergies       Not Specified: Aspirin; Bactrim [Sulfamethoxazole-trimethoprim]     Exam Information    Status Exam Begun  Exam Ended    Final [99] 4/01/2022 20:10 4/01/2022  8:20 PM [id number]  8:20 PM     Result Information    Status: Final result (Exam End: 4/1/2022 20:20) Provider Status: Open       XR CHEST PORT: Patient Communication     Released  Not seen     Study Result    Narrative & Impression   EXAM: AP portable chest     INDICATION: COPD, chest pain and shortness of breath     COMPARISON: None.     _______________     FINDINGS:     Heart and mediastinal contours are normal and stable. There is moderate to  severe emphysema. There are no pleural effusions. Stable interstitial infiltrate  again seen at the right lung base. Again seen a pleural calcifications right  lung base.     _______________     IMPRESSION     Moderate to severe emphysema and stable interstitial infiltrate at the right  lung base. No significant interval change. Results  CT ABD PELV WO CONT (Accession [id number]) (Order [id number])    Allergies       Not Specified: Aspirin; Bactrim [Sulfamethoxazole-trimethoprim]     Exam Information    Status Exam Begun  Exam Ended    Final [99] 4/01/2022 20:50 4/01/2022  9:11 PM [id number]  9:11 PM     Result Information    Status: Final result (Exam End: 4/1/2022 21:11) Provider Status: Open       CT ABD PELV WO CONT: Patient Communication     Released  Not seen     Study Result    Narrative & Impression   EXAM: CT of the abdomen and pelvis     INDICATION: Epigastric pain     COMPARISON: October 1, 2021     TECHNIQUE: Axial CT imaging of the abdomen and pelvis was performed without  intravenous contrast. Multiplanar reformats were generated.  One or more dose  reduction techniques were used on this CT: automated exposure control,  adjustment of the mAs and/or kVp according to patient size, and iterative  reconstruction techniques. The specific techniques used on this CT exam have  been documented in the patient's electronic medical record. Digital Imaging and  Communications in Medicine (DICOM) format image data are available to  nonaffiliated external healthcare facilities or entities on a secure, media  free, reciprocally searchable basis with patient authorization for at least a  12-month period after this study.     _______________     FINDINGS:     LOWER CHEST: There is pleural calcification in the right hemithorax posteriorly. Lung bases are clear otherwise.     LIVER, BILIARY: Liver is normal. No biliary dilation. Gallbladder is  unremarkable.     PANCREAS: Normal.     SPLEEN: Normal.     ADRENALS: Normal.     KIDNEYS: Moderate size cyst seen in the upper pole the right kidney. Kidneys  demonstrate no hydronephrosis or nephrolithiasis.     VASCULATURE: Moderate calcific atherosclerosis present.     LYMPH NODES: No enlarged lymph nodes.     GASTROINTESTINAL TRACT: There is wall thickening of the antrum and first portion  the duodenum with adjacent stranding suggesting peptic disease. No extraluminal  gas seen. No evidence of acute appendicitis. Colonic diverticulosis.     PELVIC ORGANS: There is urinary bladder wall thickening which may reflect under  distention or cystitis correlate with urinalysis.     BONES: No acute or aggressive osseous abnormalities identified. There is  osteopenia.     OTHER: None.     _______________     IMPRESSION     There is wall thickening of the duodenum and antrum of the stomach concerning  for peptic ulcer disease. No extraluminal gas seen to suggest perforation. Consider upper GI series and/or endoscopy for further evaluation. Advise GI  consultation.     Urinary bladder wall thickening which may reflect under distention or cystitis  correlate with urinalysis.

## 2022-04-02 NOTE — ED NOTES
Pt medicated per [facility] following allergy verification. Endorses pain as 4/10 prior to med admin.

## 2022-04-02 NOTE — PROGRESS NOTES
Bedside and Verbal shift change report given to Randy Whittington (oncoming nurse) by Darrell Cerda RN (offgoing nurse). Report included the following information SBAR, Kardex, MAR, Recent Results and Cardiac Rhythm .

## 2022-04-02 NOTE — PROGRESS NOTES
Hospitalist Progress Note    Patient: Alesia Cowden MRN: [id number]  CSN: [id number]    YOB: 1945  Age: 64 y.o.   Sex: male    DOA: 4/1/2022 LOS:  LOS: 1 day          Chief Complaint:    SOB      Assessment/Plan     75-year-old male with COPD on 2 L home oxygen, hypertension, paroxysmal atrial fibrillation, and anticoagulated for prior thromboembolus is admitted for an acute COPD exacerbation with gastritis and duodenitis concerning for peptic ulcer disease.     Acute COPD exacerbation with recent treatment for loculated pleural effusion on 2 L home oxygen  Pulmicort and Brovana nebulizers  Steroids can be weaned as feeling better  empiric abx     Gastritis and duodenitis-likely precipitated by frequent steroid use for COPD  h pylori test  PPI    Iron def anemia-start iron PO, stool softener    Bladder wall thickening-hematuria likely due to eliquis    Atrial fibrillation on anticoagulation  Continue Eliquis    Hypertension  Continue home medications     CKD stage III-improved creatinine from baseline    deafness-very hard of hearing     Consult PT    Advance diet     DNR/DNI    dispo-HHN 1-2 days    Disposition :  Patient Active Problem List   Diagnosis Code    Hypertension I10    Chronic obstructive pulmonary disease ([facility]) J44.9    Chronic renal disease, stage 3, moderately decreased glomerular filtration rate between 30-59 mL/min/1.73 square meter ([facility]) N18.30    Age-related physical debility R54    Acute on chronic respiratory failure with hypoxemia ([facility]) J96.21    PAF (paroxysmal atrial fibrillation) ([facility]) I48.0    Avascular necrosis of bone of left hip ([facility]) M87.052    Anemia D64.9    COPD with acute exacerbation ([facility]) J44.1    Acute exacerbation of chronic obstructive pulmonary disease (COPD) ([facility]) J44.1    Loculated pleural effusion J90    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    Right lower lobe lung mass R91.8    Bilateral deafness H91.93    Acute deep vein thrombosis (DVT) of femoral vein of right lower extremity (HCC) I82.411    Gastritis and duodenitis K29.90    BMI 30.0-30.9,adult Z68.30    On supplemental oxygen by nasal cannula Z78.9    Bladder wall thickening N32.89       Subjective:    I feel better  No complaints this am  No cough  No chest pains    Review of systems:    Constitutional: denies fevers, chills    Gastrointestinal: denies nausea, vomiting, diarrhea      Vital signs/Intake and Output:  Visit Vitals  BP (!) 126/59 (BP 1 Location: Right upper arm, BP Patient Position: At rest)   Pulse 79   Temp 98.1 °F (36.7 °C)   Resp 22   Ht 5' 8\" (1.727 m)   Wt 90.7 kg (200 lb)   SpO2 95%   BMI 30.41 kg/m²     Current Shift:  No intake/output data recorded. Last three shifts:  03/31 1901 - 04/02 0700  In: -   Out: 175 [Urine:175]    Exam:    General: Well developed, alert, NAD, OX3  Head/Neck: NCAT, supple, No masses, No lymphadenopathy  CVS:Regular rate and rhythm, no M/R/G, S1/S2 heard, no thrill  Lungs:Clear to auscultation bilaterally, no wheezes, rhonchi, or rales  Abdomen: Soft, Nontender, No distention, Normal Bowel sounds, No hepatomegaly  Extremities: No C/C/E, pulses palpable 2+  Skin:normal texture and turgor, no rashes, no lesions  Neuro:grossly normal , follows commands  Psych:appropriate                Labs: Results:       Chemistry Recent Labs     04/02/22 0323 04/01/22 2000   * 109*    133*   K 3.9 4.2    94*   CO2 29 31   BUN 25* 23*   CREA 1.28 1.20   CA 7.9* 8.7   AGAP 7 8   BUCR 20 19   AP 85 106   TP 5.0* 6.3*   ALB 2.2* 2.7*   GLOB 2.8 3.6   AGRAT 0.8 0.8      CBC w/Diff Recent Labs     04/02/22 0323 04/01/22 2000   WBC 12.1 16.0*   RBC 3.18* 3.76*   HGB 8.8* 10.2*   HCT 27.1* 30.9*    550*   GRANS  --  79*   LYMPH  --  13*   EOS  --  0      Cardiac Enzymes No results for input(s): CPK, CKND1, WILLARD in the last 72 hours.     No lab exists for component: CKRMB, TROIP   Coagulation No results for input(s): PTP, INR, APTT, INREXT in the last 72 hours. Lipid Panel No results found for: CHOL, CHOLPOCT, CHOLX, CHLST, CHOLV, 142918, HDL, HDLP, LDL, LDLC, DLDLP, 110046, VLDLC, VLDL, TGLX, TRIGL, TRIGP, TGLPOCT, CHHD, CHHDX   BNP No results for input(s): BNPP in the last 72 hours.    Liver Enzymes Recent Labs     04/02/22  0323   TP 5.0*   ALB 2.2*   AP 85      Thyroid Studies Lab Results   Component Value Date/Time    TSH 3.06 04/05/2021 01:30 PM        Procedures/imaging: see electronic medical records for all procedures/Xrays and details which were not copied into this note but were reviewed prior to creation of Vijaya Forte MD

## 2022-04-02 NOTE — ED TRIAGE NOTES
Patient brought to ED by EMS c/c SOB, C/P, and abdominal pain. Patient given 2 Albuterol treatments en route, also 1 Atrovent.  Patient has hx of COPD, increased O2 to 3L via NC en route, SpO2 100% upon arrival.
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
## 2022-04-02 NOTE — ED PROVIDER NOTES
EMERGENCY DEPARTMENT HISTORY AND PHYSICAL EXAM      Date: 4/1/2022  Patient Name: Guille Coates      History of Presenting Illness     Chief Complaint   Patient presents with    Shortness of Breath    Chest Pain       History Provided By: Patient and EMS    HPI: Guille Coates, 64 y.o. male with a past medical history significant COPD and O2 dependent 2 L nasal cannula, prostate cancer s/p radiation 3/2017, BPH, and aneurysm, hypertension, arthritis,presents to the ED, brought by EMS, with cc of shortness of breath, chest and abdomen pain. Patient has history of dementia, is also hard of hearing, and poor historian. Symptoms began at this morning, and points to the epigastric area for his pain. He is unable to describe the pain but rates it as 6 out of 10. Pain is localized. There is no history of fever, nausea vomiting or diarrhea. Patient given 2 neb treatments by EMS, oxygen was also increased to 3 L. He admits to cough but no fever, no chest pain, no abdomen pain.     There are no other complaints, changes, or physical findings at this time.     PCP: Daylin Chin MD    Facility-Administered Medications Ordered in Other Encounters   Medication Dose Route Frequency Provider Last Rate Last Admin    [START ON 4/12/2022] Vancomycin Random level to be drawn on 4/12 @ 1500  1 Each Other ONCE Lillian Lanier MD        iron sucrose (VENOFER) 200 mg in 0.9% sodium chloride 100 mL IVPB  200 mg IntraVENous Q24H Batsheva Lopez  mL/hr at 04/11/22 1542 200 mg at 04/11/22 1542    pantoprazole (PROTONIX) tablet 40 mg  40 mg Oral [facility] MD Fab   40 mg at 04/11/22 1701    sodium chloride (NS) flush 5-10 mL  5-10 mL IntraVENous PRN Yoana Escalera MD   10 mL at 04/09/22 2154    albuterol-ipratropium (DUO-NEB) 2.5 MG-0.5 MG/3 ML  3 mL Nebulization Q4H PRN Batsheva Lopez MD        [Held by provider] apixaban Karthik Linea) tablet 5 mg  5 mg Oral BID Batsheva Lopez MD   5 mg at 04/07/22 1208    [Held by provider] furosemide (LASIX) tablet 20 mg  20 mg Oral DAILY Batsheva Lopez MD   20 mg at 04/10/22 1008    gabapentin (NEURONTIN) capsule 100 mg  100 mg Oral TID Batsheva Lopez MD   100 mg at 04/11/22 1701    tamsulosin (FLOMAX) capsule 0.4 mg  0.4 mg Oral QPM Batsheva Lopez MD   0.4 mg at 04/11/22 1701    traMADoL (ULTRAM) tablet 50 mg  50 mg Oral Q8H PRN Batsheva Lopez MD        acetaminophen (TYLENOL) tablet 650 mg  650 mg Oral Q6H PRN Batsheva Lopez MD        ondansetron [facility]) injection 4 mg  4 mg IntraVENous Q6H PRN Batsheva Lopez MD        carboxymethylcellulose sodium (REFRESH PLUS) 0.5 % ophthalmic solution 2 Drop  2 Drop Both Eyes DAILY PRN Batsheva Lopez MD        vancomycin (VANCOCIN) 1250 mg in  ml infusion  1,250 mg IntraVENous Q24H Batsheva Lopez  mL/hr at 04/11/22 1701 1,250 mg at 04/11/22 1701    Vancomycin Pharmacy To Dose  1 Each Other Rx Dosing/Monitoring Batsheva Lopez MD        arformoteroL [facility]) neb solution 15 mcg  15 mcg Nebulization BID RT Batsheva Lopez MD   15 mcg at 04/11/22 6986    budesonide (PULMICORT) 500 mcg/2 ml nebulizer suspension  500 mcg Nebulization BID RT Batsheva Lopez MD   500 mcg at 04/11/22 8501    amLODIPine (NORVASC) tablet 10 mg  10 mg Oral DAILY Batsheva Lopez MD   10 mg at 04/11/22 0900       Past History     Past Medical History:  Past Medical History:   Diagnosis Date    BMI 30.0-30.9,adult 4/2/2022    Brain aneurysm     Brain aneurysm     Cancer [facility])     prostate    CHF (congestive heart failure) (HCC)     CKD (chronic kidney disease)     Closed nondisplaced supracondylar fracture of lower end of left femur without intracondylar extension with delayed healing     COPD [facility] [address]     Debility     History of home oxygen therapy     [place] (hard of hearing)     Hypertension     Nocturia     On home oxygen therapy     PAF (paroxysmal atrial fibrillation) ([address]     Pneumonia     Prostate CA [facility] [address]     Prostate neoplasm     Radiation effect        Past Surgical History:  Past Surgical History:   Procedure Laterality Date    HX HERNIA REPAIR      HX HIP ARTHROSCOPY  04/09/2021       Family History:  Family History   Problem Relation Age of Onset    Heart Disease Mother        Social History:  Social History     Tobacco Use    Smoking status: Former Smoker     Types: Cigarettes    Smokeless tobacco: Never Used   Substance Use Topics    Alcohol use: No    Drug use: Never       Allergies: Allergies   Allergen Reactions    Aspirin Other (comments)     \"messes my stomach up\"    Bactrim [Sulfamethoxazole-Trimethoprim] Unknown (comments)         Review of Systems     Review of Systems   Unable to perform ROS: Dementia       Physical Exam     Physical Exam  Vitals and nursing note reviewed. Constitutional:       General: He is not in acute distress. Appearance: He is well-developed. He is not diaphoretic. Comments: Elderly male, anxious, in mild respiratory distress. HENT:      Head: Normocephalic and atraumatic. No right periorbital erythema or left periorbital erythema. Jaw: No trismus. Right Ear: External ear normal. No drainage or swelling. Tympanic membrane is not perforated, erythematous or bulging. Left Ear: External ear normal. No drainage or swelling. Tympanic membrane is not perforated, erythematous or bulging. Nose: Nose normal. No mucosal edema or rhinorrhea. Right Sinus: No maxillary sinus tenderness or frontal sinus tenderness. Left Sinus: No maxillary sinus tenderness or frontal sinus tenderness. Mouth/Throat:      Mouth: No oral lesions. Dentition: No dental abscesses. Pharynx: Uvula midline. No oropharyngeal exudate, posterior oropharyngeal erythema or uvula swelling. Tonsils: No tonsillar abscesses. Eyes:      General: No scleral icterus. Right eye: No discharge. Left eye: No discharge. Conjunctiva/sclera: Conjunctivae normal.   Cardiovascular:      Rate and Rhythm: Normal rate and regular rhythm. Heart sounds: Normal heart sounds. No murmur heard. No friction rub. No gallop. Pulmonary:      Effort: Pulmonary effort is normal. No tachypnea, accessory muscle usage or respiratory distress. Breath sounds: Examination of the right-upper field reveals wheezing. Examination of the left-upper field reveals wheezing. Examination of the right-middle field reveals wheezing. Examination of the left-middle field reveals wheezing. Examination of the right-lower field reveals wheezing. Examination of the left-lower field reveals wheezing. Wheezing present. No decreased breath sounds, rhonchi or rales. Abdominal:      General: Bowel sounds are normal. There is no distension. Palpations: Abdomen is soft. Tenderness: There is no abdominal tenderness. Comments: There is a tenderness to palpation of the right upper quadrant and left upper quadrant. There is voluntary guarding. There is no rebound. Musculoskeletal:         General: No tenderness. Normal range of motion. Cervical back: Normal range of motion and neck supple. Lymphadenopathy:      Cervical: No cervical adenopathy. Skin:     General: Skin is warm and dry. Neurological:      Mental Status: He is alert and oriented to person, place, and time. Psychiatric:         Judgment: Judgment normal.         Lab and Diagnostic Study Results     Labs -     No results found for this or any previous visit (from the past 12 hour(s)). Radiologic Studies -   [unfilled]  CT Results  (Last 48 hours)    None        CXR Results  (Last 48 hours)    None          Medical Decision Making and ED Course   - I am the first and primary provider for this patient AND AM THE PRIMARY PROVIDER OF RECORD.     - I reviewed the vital signs, available nursing notes, past medical history, past surgical history, family history and social history. - Initial assessment performed. The patients presenting problems have been discussed, and the staff are in agreement with the care plan formulated and outlined with them. I have encouraged them to ask questions as they arise throughout their visit. Vital Signs-Reviewed the patient's vital signs. No data found. EKG interpretation: (Preliminary): Performed at 20:00, and read at 20:00  Rhythm: normal sinus rhythm; and regular . Rate (approx.): 96; Axis: normal; ND interval: normal; QRS interval: normal ; ST/T wave: normal; Other findings: .      Records Reviewed: Nursing Notes and Old Medical Records    The patient presents with shortness of breath with chest and abdomen pain with a differential diagnosis of COPD exacerbation, MI, CHF, pneumothorax, dissection, PE, pancreatitis, GERD, gallbladder disease, AAA, diverticulitis, perforated viscus, pneumonia    ED Course:     Patient with her history of COPD and presents with shortness of breath and epigastric pain. CT scan of abdomen shows concerning features of peptic ulcer disease. Discussed the patient with the hospitalist admission for further management of his COPD and also further evaluation of his abdomen pain. Provider Notes (Medical Decision Making):               Consultations:       Consultations:         Procedures and Critical Care                       Disposition     Disposition: Admitted to telemetry the case was discussed with the admitting physician Lily    Diagnosis:   1. Acute exacerbation of chronic obstructive pulmonary disease (COPD) (HCC)    2. Abdominal pain, epigastric              Diagnosis     Clinical Impression:   1. Acute exacerbation of chronic obstructive pulmonary disease (COPD) (HCC)    2. Abdominal pain, epigastric        Attestations:    Yuliana Johnson MD    Please note that this dictation was completed with [email or web address], the computer voice recognition software.   Quite often unanticipated grammatical, syntax, homophones, and other interpretive errors are inadvertently transcribed by the computer software. Please disregard these errors. Please excuse any errors that have escaped final proofreading. Thank you.

## 2022-04-02 NOTE — PROGRESS NOTES
Pt found with NC in and o2 flow meter off. SP02 on RA is 97-98%. Pt tolerated treatment well. No distress noted. NC taken off and pt is left on RA.

## 2022-04-03 VITALS
BODY MASS INDEX: 25.08 KG/M2 | OXYGEN SATURATION: 100 % | WEIGHT: 165.5 LBS | HEIGHT: 68 IN | RESPIRATION RATE: 20 BRPM | SYSTOLIC BLOOD PRESSURE: 134 MMHG | TEMPERATURE: 97.3 F | HEART RATE: 92 BPM | DIASTOLIC BLOOD PRESSURE: 53 MMHG

## 2022-04-03 LAB
ALBUMIN SERPL-MCNC: 2.5 G/DL (ref 3.4–5)
ALBUMIN/GLOB SERPL: 0.8 {RATIO} (ref 0.8–1.7)
ALP SERPL-CCNC: 88 U/L (ref 45–117)
ALT SERPL-CCNC: 10 U/L (ref 16–61)
ANION GAP SERPL CALC-SCNC: 11 MMOL/L (ref 3–18)
AST SERPL-CCNC: 5 U/L (ref 10–38)
BILIRUB SERPL-MCNC: 0.2 MG/DL (ref 0.2–1)
BUN SERPL-MCNC: 36 MG/DL (ref 7–18)
BUN/CREAT SERPL: 19 (ref 12–20)
CALCIUM SERPL-MCNC: 7.6 MG/DL (ref 8.5–10.1)
CHLORIDE SERPL-SCNC: 97 MMOL/L (ref 100–111)
CO2 SERPL-SCNC: 27 MMOL/L (ref 21–32)
CREAT SERPL-MCNC: 1.89 MG/DL (ref 0.6–1.3)
ERYTHROCYTE [DISTWIDTH] IN BLOOD BY AUTOMATED COUNT: 14 % (ref 11.6–14.5)
FOLATE SERPL-MCNC: 11.1 NG/ML (ref 3.1–17.5)
GLOBULIN SER CALC-MCNC: 3.3 G/DL (ref 2–4)
GLUCOSE SERPL-MCNC: 149 MG/DL (ref 74–99)
HCT VFR BLD AUTO: 26.3 % (ref 36–48)
HGB BLD-MCNC: 8.3 G/DL (ref 13–16)
MCH RBC QN AUTO: 27.2 PG (ref 24–34)
MCHC RBC AUTO-ENTMCNC: 31.6 G/DL (ref 31–37)
MCV RBC AUTO: 86.2 FL (ref 78–100)
NRBC # BLD: 0 K/UL (ref 0–0.01)
NRBC BLD-RTO: 0 PER 100 WBC
PLATELET # BLD AUTO: 393 K/UL (ref 135–420)
PMV BLD AUTO: 9.3 FL (ref 9.2–11.8)
POTASSIUM SERPL-SCNC: 4.1 MMOL/L (ref 3.5–5.5)
PROT SERPL-MCNC: 5.8 G/DL (ref 6.4–8.2)
RBC # BLD AUTO: 3.05 M/UL (ref 4.35–5.65)
RETICS/RBC NFR AUTO: 2.1 % (ref 0.5–2.5)
SODIUM SERPL-SCNC: 135 MMOL/L (ref 136–145)
VIT B12 SERPL-MCNC: 249 PG/ML (ref 211–911)
WBC # BLD AUTO: 8.7 K/UL (ref 4.6–13.2)

## 2022-04-03 PROCEDURE — 85027 COMPLETE CBC AUTOMATED: CPT

## 2022-04-03 PROCEDURE — 85045 AUTOMATED RETICULOCYTE COUNT: CPT

## 2022-04-03 PROCEDURE — 74011250636 HC RX REV CODE- 250/636: Performed by: FAMILY MEDICINE

## 2022-04-03 PROCEDURE — 80053 COMPREHEN METABOLIC PANEL: CPT

## 2022-04-03 PROCEDURE — 94760 N-INVAS EAR/PLS OXIMETRY 1: CPT

## 2022-04-03 PROCEDURE — 74011000250 HC RX REV CODE- 250: Performed by: FAMILY MEDICINE

## 2022-04-03 PROCEDURE — 74011250637 HC RX REV CODE- 250/637: Performed by: HOSPITALIST

## 2022-04-03 PROCEDURE — 94640 AIRWAY INHALATION TREATMENT: CPT

## 2022-04-03 PROCEDURE — 36415 COLL VENOUS BLD VENIPUNCTURE: CPT

## 2022-04-03 PROCEDURE — 77010033678 HC OXYGEN DAILY

## 2022-04-03 PROCEDURE — 74011250636 HC RX REV CODE- 250/636: Performed by: HOSPITALIST

## 2022-04-03 PROCEDURE — 74011250637 HC RX REV CODE- 250/637: Performed by: FAMILY MEDICINE

## 2022-04-03 PROCEDURE — 82607 VITAMIN B-12: CPT

## 2022-04-03 PROCEDURE — C9113 INJ PANTOPRAZOLE SODIUM, VIA: HCPCS | Performed by: FAMILY MEDICINE

## 2022-04-03 RX ORDER — PREDNISONE 20 MG/1
TABLET ORAL
Qty: 15 TABLET | Refills: 0 | Status: SHIPPED | OUTPATIENT
Start: 2022-04-03 | End: 2022-04-15

## 2022-04-03 RX ORDER — DOXYCYCLINE 100 MG/1
100 CAPSULE ORAL 2 TIMES DAILY
Qty: 10 CAPSULE | Refills: 0 | Status: SHIPPED | OUTPATIENT
Start: 2022-04-03 | End: 2022-04-15

## 2022-04-03 RX ADMIN — METHYLPREDNISOLONE SODIUM SUCCINATE 40 MG: 40 INJECTION, POWDER, FOR SOLUTION INTRAMUSCULAR; INTRAVENOUS at 00:41

## 2022-04-03 RX ADMIN — FERROUS SULFATE TAB 325 MG (65 MG ELEMENTAL FE) 325 MG: 325 (65 FE) TAB at 08:28

## 2022-04-03 RX ADMIN — ARFORMOTEROL TARTRATE 15 MCG: 15 SOLUTION RESPIRATORY (INHALATION) at 07:21

## 2022-04-03 RX ADMIN — DOCUSATE SODIUM 100 MG: 100 CAPSULE ORAL at 08:27

## 2022-04-03 RX ADMIN — BUDESONIDE 500 MCG: 0.5 INHALANT RESPIRATORY (INHALATION) at 07:21

## 2022-04-03 RX ADMIN — IPRATROPIUM BROMIDE AND ALBUTEROL SULFATE 3 ML: .5; 3 SOLUTION RESPIRATORY (INHALATION) at 11:20

## 2022-04-03 RX ADMIN — COLLAGENASE SANTYL: 250 OINTMENT TOPICAL at 08:27

## 2022-04-03 RX ADMIN — AZITHROMYCIN MONOHYDRATE 500 MG: 500 INJECTION, POWDER, LYOPHILIZED, FOR SOLUTION INTRAVENOUS at 00:42

## 2022-04-03 RX ADMIN — IPRATROPIUM BROMIDE AND ALBUTEROL SULFATE 3 ML: .5; 3 SOLUTION RESPIRATORY (INHALATION) at 00:25

## 2022-04-03 RX ADMIN — METHYLPREDNISOLONE SODIUM SUCCINATE 40 MG: 40 INJECTION, POWDER, FOR SOLUTION INTRAMUSCULAR; INTRAVENOUS at 06:08

## 2022-04-03 RX ADMIN — GABAPENTIN 100 MG: 100 CAPSULE ORAL at 08:28

## 2022-04-03 RX ADMIN — SODIUM CHLORIDE 40 MG: 9 INJECTION, SOLUTION INTRAMUSCULAR; INTRAVENOUS; SUBCUTANEOUS at 08:27

## 2022-04-03 RX ADMIN — SODIUM CHLORIDE, PRESERVATIVE FREE 10 ML: 5 INJECTION INTRAVENOUS at 08:28

## 2022-04-03 RX ADMIN — IPRATROPIUM BROMIDE AND ALBUTEROL SULFATE 3 ML: .5; 3 SOLUTION RESPIRATORY (INHALATION) at 07:21

## 2022-04-03 RX ADMIN — APIXABAN 5 MG: 5 TABLET, FILM COATED ORAL at 08:28

## 2022-04-03 RX ADMIN — CHLORTHALIDONE 25 MG: 25 TABLET ORAL at 08:28

## 2022-04-03 RX ADMIN — IPRATROPIUM BROMIDE AND ALBUTEROL SULFATE 3 ML: .5; 3 SOLUTION RESPIRATORY (INHALATION) at 04:01

## 2022-04-03 RX ADMIN — FUROSEMIDE 20 MG: 20 TABLET ORAL at 08:28

## 2022-04-03 RX ADMIN — AMLODIPINE BESYLATE 5 MG: 5 TABLET ORAL at 08:28

## 2022-04-03 NOTE — PROGRESS NOTES
CM notified patient will discharge today, CM notes patient has therapy ordered but no notes at this time, CM to watch for recommendations to assist in identifying any discharge needs. CM notes PT has no recommendations for discharge. No CM needs identified. Care Management Interventions  PCP Verified by CM:  Yes  Mode of Transport at Discharge: Self  Transition of Care Consult (CM Consult): Discharge Planning  Physical Therapy Consult: Yes  Occupational Therapy Consult: Yes  Support Systems: Spouse/Significant Other,Child(prashant)  Confirm Follow Up Transport: Family  The Plan for Transition of Care is Related to the Following Treatment Goals : COPD with acute exacerbation  The Patient and/or Patient Representative was Provided with a Choice of Provider and Agrees with the Discharge Plan?: Yes  Name of the Patient Representative Who was Provided with a Choice of Provider and Agrees with the Discharge Plan: Veronica Hidalgo, patient  Discharge Location  Patient Expects to be Discharged to[de-identified] Home with family assistance

## 2022-04-03 NOTE — DISCHARGE SUMMARY
[facility] SUMMARY    Name:  Jim Briggs  MR#:   [id number]  :  1945  ACCOUNT #:  [de-identified]  ADMIT DATE:  2022  DISCHARGE DATE:  2022      DISCHARGE DIAGNOSES:  Acute chronic obstructive pulmonary disease exacerbation. HOSPITAL SUMMARY:  This is a 68-year-old gentleman on home oxygen. He does have advanced COPD, otherwise he is fairly independent and mobilizes on his own. He does have a history of prostate cancer, chronic kidney disease, hearing loss, paroxysmal AFib. He does take an anticoagulant which is Eliquis and he was recently here in the hospital from  to . He came back on the aforementioned date with worsening dyspnea on exertion and has responded very well to nebulizer treatments, steroids, and empiric antibiotics. In fact, he feels well enough to go home today. He denies any worsening cough, wheezing, or shortness of breath. He is up and ambulatory in the room and dressed himself today without issue. He has eaten breakfast.  He denies any new complaints of chest pain, productive cough, or shortness of breath. With that, there was concern about gastritis and duodenitis. He has had no GI symptoms here. No abdominal pain, blood in the stool, nausea, or vomiting. There was noted bladder wall thickening that could be associated with hematuria likely due to his chronic anticoagulation. He is very hard of hearing, but with writing things down and talking very loudly he is able to understand fully and has no dementia. He has a Do Not Resuscitate code status. Again he has responded well to treatment here. Today's exam shows a well-developed elderly male in no acute distress. Lungs are clear bilaterally. He is on a 2-liter  nasal cannula at 99% saturation, blood pressure 148/56, temperature 98.2, respiratory rate 19. Cardiac exam, irregular rhythm, regular rate. Abdomen is benign. Lower extremities are without edema.   His H and H is 8.3 and 26.3, not unlike where he was between 8 and 9 at the end of March. His BUN and creatinine 36 and 1.89 consistent with his chronic kidney disease. LFTs are low. His hemoglobin A1c is 5.5%. Troponin was normal on admission. Potassium is 4.1. The patient had a CT abdomen and pelvis when he came in. There was wall thickening of the duodenum and antrum concerning for peptic ulcer disease, but nothing to suggest perforation and he has had no complaints of dysuria or hematuria at this point. He can follow up for peptic ulcer disease and urinary issues as an outpatient as he seems anxious to leave the hospital at this point. X-ray on the 1st showed moderate to severe emphysema and stable interstitial infiltrate at the right lung base. With that, the patient has had antibiotics here. We will transition to oral steroids and antibiotics for his discharge. Follow up with Dr. Emilie Jin in 1 week. Please note to check urinalysis and review peptic ulcer disease treatment and evaluation further as an outpatient. DISCHARGE MEDICATIONS:  For his new meds,  I have prescribed:  1. Doxycycline 100 mg twice daily for 5 days and prednisone taper from 40 mg daily for three days to 20 mg daily for three days then 10 mg daily for three days. He will otherwise resume his:  1. Home oxygen as needed. 2.  Albuterol two puffs every 4 hours as needed. 3.  Pepcid 20 mg daily. 4.  Eliquis 5 mg twice daily. 5.  Hygroton 25 mg daily. 6.  Lasix 20 mg daily. 7.  Tramadol 50 mg q.8 h. as needed for pain. 8.  Norvasc 5 mg daily. 9.  Brovana nebs twice daily. 10.  Pulmicort nebs twice daily. 11.  Ferrous sulfate 325 mg twice daily. 12.  Gabapentin 100 mg three times daily. 13.  Flomax 0.4 mg daily. He is stable for release from the hospital today. He has no questions. He feels good. He does not need any support. He has declined any PT this morning as he says he is already doing what he needs to do.   He has a Do Not Resuscitate. He should be on a low-sodium diet for home. Plan, discharge home today. 40 minutes discharge time.       Santo Ayala MD      [id number]  D:  04/03/2022 11:31  T:  04/03/2022 12:45  JOB #:  [id number]  CC:  Phuong Mcleod MD

## 2022-04-03 NOTE — PROGRESS NOTES
Discharge instructions reviewed with the patient and pt spouse. Patient verbalized understanding and verified by teach back. All questions answered. IV discontinued, no redness, swelling or pain noted. Patient discharged off the unit via wheelchair. Patient armband removed and shredded.

## 2022-04-03 NOTE — PROGRESS NOTES
Problem: Chronic Obstructive Pulmonary Disease (COPD)  Goal: *Oxygen saturation during activity within specified parameters  Outcome: Progressing Towards Goal  Goal: *Able to remain out of bed as prescribed  Outcome: Progressing Towards Goal  Goal: *Absence of hypoxia  Outcome: Progressing Towards Goal  Goal: *Optimize nutritional status  Outcome: Progressing Towards Goal     Problem: Falls - Risk of  Goal: *Absence of Falls  Description: Document Adrell Fall Risk and appropriate interventions in the flowsheet.   Outcome: Progressing Towards Goal  Note: Fall Risk Interventions:  Mobility Interventions: Bed/chair exit alarm         Medication Interventions: Evaluate medications/consider consulting pharmacy    Elimination Interventions: Call light in reach

## 2022-04-03 NOTE — DISCHARGE INSTRUCTIONS
DISCHARGE SUMMARY from Nurse    PATIENT INSTRUCTIONS:    After general anesthesia or intravenous sedation, for 24 hours or while taking prescription Narcotics:  · Limit your activities  · Do not drive and operate hazardous machinery  · Do not make important personal or business decisions  · Do  not drink alcoholic beverages  · If you have not urinated within 8 hours after discharge, please contact your surgeon on call. Report the following to your surgeon:  · Excessive pain, swelling, redness or odor of or around the surgical area  · Temperature over 100.5  · Nausea and vomiting lasting longer than 4 hours or if unable to take medications  · Any signs of decreased circulation or nerve impairment to extremity: change in color, persistent  numbness, tingling, coldness or increase pain  · Any questions    What to do at Home:  Recommended activity: Activity as tolerated,     If you experience any of the following symptoms , please follow up with . *  Please give a list of your current medications to your Primary Care Provider. *  Please update this list whenever your medications are discontinued, doses are      changed, or new medications (including over-the-counter products) are added. *  Please carry medication information at all times in case of emergency situations. These are general instructions for a healthy lifestyle:    No smoking/ No tobacco products/ Avoid exposure to second hand smoke  Surgeon General's Warning:  Quitting smoking now greatly reduces serious risk to your health.     Obesity, smoking, and sedentary lifestyle greatly increases your risk for illness    A healthy diet, regular physical exercise & weight monitoring are important for maintaining a healthy lifestyle    You may be retaining fluid if you have a history of heart failure or if you experience any of the following symptoms:  Weight gain of 3 pounds or more overnight or 5 pounds in a week, increased swelling in our hands or feet or shortness of breath while lying flat in bed. Please call your doctor as soon as you notice any of these symptoms; do not wait until your next office visit. The discharge information has been reviewed with the patient. The patient verbalized understanding. Discharge medications reviewed with the patient  Patient Education        COPD Exacerbation Plan: Care Instructions  Overview     If you have chronic obstructive pulmonary disease (COPD), your usual shortness of breath could suddenly get worse. You may start coughing more and have more mucus. This flare-up is called a COPD exacerbation (say \"mz-[id number]-BAY-[id number]\"). A lung infection or air pollution could set off an exacerbation. Sometimes it can happen after being around chemicals. Work with your doctor to make a plan for dealing with an exacerbation. You can better manage it if you plan ahead. Follow-up care is a key part of your treatment and safety. Be sure to make and go to all appointments, and call your doctor if you are having problems. It's also a good idea to know your test results and keep a list of the medicines you take. How can you care for yourself at home? During an exacerbation  · Do not panic if you start to have one. Quick treatment may help you prevent serious breathing problems. If you have a COPD exacerbation plan that you developed with your doctor, follow it. · Take your medicines exactly as your doctor tells you.  ? Use your inhaler as directed by your doctor. If your symptoms do not get better after you use your medicine, have someone take you to the emergency room. Call an ambulance if necessary. ? With inhaled medicines, a spacer or a nebulizer may help you get more medicine to your lungs. Ask your doctor or pharmacist how to use them properly. Practice using the spacer in front of a mirror before you have an exacerbation. This may help you get the medicine into your lungs quickly.   ? If your doctor has given you steroid pills, take them as directed. ? Your doctor may have given you a prescription for antibiotics, which you can fill if you need it. ? Talk to your doctor if you have any problems with your medicine. And call your doctor if you have to use your antibiotic or steroid pills. Preventing an exacerbation  · Do not smoke. This is the most important step you can take to prevent more damage to your lungs and prevent problems. If you already smoke, it is never too late to stop. If you need help quitting, talk to your doctor about stop-smoking programs and medicines. These may increase your chances of quitting for good. · Take your daily medicines as prescribed. · Avoid colds and other infections. ? Get a flu vaccine each year, as soon as it is available. Ask those you live or work with to do the same, so they will not get the flu and infect you.  ? Get the pneumococcal and whooping cough (pertussis) vaccines. ? Try to stay away from people with colds or the flu. ? Wash your hands often. · Avoid secondhand smoke and air pollution. Try to stay inside with your windows closed when air pollution is bad. · Learn breathing techniques for COPD, such as pursed-lip breathing. These techniques may help you breathe easier during an exacerbation. When should you call for help? Call 911 anytime you think you may need emergency care. For example, call if:    · You have severe trouble breathing.     · You have severe chest pain. Call your doctor now or seek immediate medical care if:    · You have new or worse shortness of breath.     · You develop new chest pain.     · You are coughing more deeply or more often, especially if you notice more mucus or a change in the color of your mucus.     · You cough up blood.     · You have new or increased swelling in your legs or belly.     · You have a fever.    Watch closely for changes in your health, and be sure to contact your doctor if:    · You need to use your antibiotic or steroid pills.     · Your symptoms are getting worse. Where can you learn more? Go to http://www.gray.com/  Enter U536 in the search box to learn more about \"COPD Exacerbation Plan: Care Instructions. \"  Current as of: July 6, 2021               Content Version: 13.2  © 2006-2022 [employer]. Care instructions adapted under license by [employer] (which disclaims liability or warranty for this information). If you have questions about a medical condition or this instruction, always ask your healthcare professional. [address] any warranty or liability for your use of this information. and appropriate educational materials and side effects teaching were provided.   ___________________________________________________________________________________________________________________________________

## 2022-04-03 NOTE — PROGRESS NOTES
4/3/2022 PT note: consult received and chart reviewed. Spoke with pt who is found seated EOB and dressed in street clothes awaiting discharge. Pt is extremely [place], therefore written communication used. Pt states \"Oh yeah, I don't have any trouble walking. I can do anything\". Pt reports amb w/o AD to/from bathroom in room and uses RW at home. Reports no PT services received at home at this time. PT screen completed. Pt w/o skilled PT needs at this time. Will acknowledge and discontinue PT orders.   Thank you for this referral.   Fabián Martell, PT

## 2022-04-04 ENCOUNTER — PATIENT OUTREACH (OUTPATIENT)
Dept: CASE MANAGEMENT | Age: 77
End: 2022-04-04

## 2022-04-04 NOTE — PROGRESS NOTES
Care Transitions Initial Call    Call within 2 business days of discharge: Yes     Patient: Kaley Artis Patient : 1945 MRN: [id number]    Last Discharge [address]       Complaint Diagnosis Description Type Department Provider    22 Shortness of Breath; Chest Pain Acute exacerbation of chronic obstructive pulmonary disease (COPD) ([facility] [address] . .. ED to Hosp-Admission (Discharged) (ADMIT) Rafaela Trujillo MD; Myesha Michael. .. Was this an external facility discharge? No [facility]. CTN reviewed patient chart and attempted to call him for hospital follow up. He was unavailable at the time of the call. Message left introducing myself, explaining the reason for my call and giving my contact information. Requested that patient return my call at his earliest convenience. Will follow.

## 2022-04-05 ENCOUNTER — HOSPITAL ENCOUNTER (EMERGENCY)
Age: 77
Discharge: HOME | DRG: 872 | End: 2022-04-06
Attending: EMERGENCY MEDICINE
Payer: MEDICARE

## 2022-04-05 ENCOUNTER — PATIENT OUTREACH (OUTPATIENT)
Dept: CASE MANAGEMENT | Age: 77
End: 2022-04-05

## 2022-04-05 DIAGNOSIS — K29.80 DUODENITIS: Primary | ICD-10-CM

## 2022-04-05 LAB
ALBUMIN SERPL-MCNC: 2.6 G/DL (ref 3.4–5)
ALBUMIN/GLOB SERPL: 0.8 {RATIO} (ref 0.8–1.7)
ALP SERPL-CCNC: 89 U/L (ref 45–117)
ALT SERPL-CCNC: 17 U/L (ref 16–61)
ANION GAP SERPL CALC-SCNC: 4 MMOL/L (ref 3–18)
AST SERPL-CCNC: 10 U/L (ref 10–38)
BASOPHILS # BLD: 0 K/UL (ref 0–0.1)
BASOPHILS NFR BLD: 0 % (ref 0–2)
BILIRUB SERPL-MCNC: 0.2 MG/DL (ref 0.2–1)
BUN SERPL-MCNC: 39 MG/DL (ref 7–18)
BUN/CREAT SERPL: 30 (ref 12–20)
CALCIUM SERPL-MCNC: 6.8 MG/DL (ref 8.5–10.1)
CHLORIDE SERPL-SCNC: 104 MMOL/L (ref 100–111)
CO2 SERPL-SCNC: 31 MMOL/L (ref 21–32)
CREAT SERPL-MCNC: 1.28 MG/DL (ref 0.6–1.3)
DIFFERENTIAL METHOD BLD: ABNORMAL
EOSINOPHIL # BLD: 0 K/UL (ref 0–0.4)
EOSINOPHIL NFR BLD: 0 % (ref 0–5)
ERYTHROCYTE [DISTWIDTH] IN BLOOD BY AUTOMATED COUNT: 14.7 % (ref 11.6–14.5)
GLOBULIN SER CALC-MCNC: 3.1 G/DL (ref 2–4)
GLUCOSE SERPL-MCNC: 109 MG/DL (ref 74–99)
HCT VFR BLD AUTO: 28.3 % (ref 36–48)
HGB BLD-MCNC: 8.8 G/DL (ref 13–16)
IMM GRANULOCYTES # BLD AUTO: 0.1 K/UL (ref 0–0.04)
IMM GRANULOCYTES NFR BLD AUTO: 1 % (ref 0–0.5)
LACTATE SERPL-SCNC: 1 MMOL/L (ref 0.4–2)
LIPASE SERPL-CCNC: 161 U/L (ref 73–393)
LYMPHOCYTES # BLD: 0.8 K/UL (ref 0.9–3.6)
LYMPHOCYTES NFR BLD: 8 % (ref 21–52)
MAGNESIUM SERPL-MCNC: 1.8 MG/DL (ref 1.6–2.6)
MCH RBC QN AUTO: 27 PG (ref 24–34)
MCHC RBC AUTO-ENTMCNC: 31.1 G/DL (ref 31–37)
MCV RBC AUTO: 86.8 FL (ref 78–100)
MONOCYTES # BLD: 0.4 K/UL (ref 0.05–1.2)
MONOCYTES NFR BLD: 4 % (ref 3–10)
NEUTS SEG # BLD: 8.7 K/UL (ref 1.8–8)
NEUTS SEG NFR BLD: 87 % (ref 40–73)
NRBC # BLD: 0 K/UL (ref 0–0.01)
NRBC BLD-RTO: 0 PER 100 WBC
PLATELET # BLD AUTO: 416 K/UL (ref 135–420)
PMV BLD AUTO: 9.2 FL (ref 9.2–11.8)
POTASSIUM SERPL-SCNC: 4 MMOL/L (ref 3.5–5.5)
PROT SERPL-MCNC: 5.7 G/DL (ref 6.4–8.2)
RBC # BLD AUTO: 3.26 M/UL (ref 4.35–5.65)
SODIUM SERPL-SCNC: 139 MMOL/L (ref 136–145)
WBC # BLD AUTO: 10 K/UL (ref 4.6–13.2)

## 2022-04-05 PROCEDURE — 74011250636 HC RX REV CODE- 250/636: Performed by: EMERGENCY MEDICINE

## 2022-04-05 PROCEDURE — 83735 ASSAY OF MAGNESIUM: CPT

## 2022-04-05 PROCEDURE — 96374 THER/PROPH/DIAG INJ IV PUSH: CPT

## 2022-04-05 PROCEDURE — 99285 EMERGENCY DEPT VISIT HI MDM: CPT

## 2022-04-05 PROCEDURE — 83690 ASSAY OF LIPASE: CPT

## 2022-04-05 PROCEDURE — 83605 ASSAY OF LACTIC ACID: CPT

## 2022-04-05 PROCEDURE — 80053 COMPREHEN METABOLIC PANEL: CPT

## 2022-04-05 PROCEDURE — 85025 COMPLETE CBC W/AUTO DIFF WBC: CPT

## 2022-04-05 RX ORDER — MORPHINE SULFATE 4 MG/ML
4 INJECTION INTRAVENOUS
Status: COMPLETED | OUTPATIENT
Start: 2022-04-05 | End: 2022-04-05

## 2022-04-05 RX ADMIN — MORPHINE SULFATE 4 MG: 4 INJECTION INTRAVENOUS at 23:38

## 2022-04-05 NOTE — PROGRESS NOTES
Care Transitions Initial Call    Call within 2 business days of discharge: Yes     Patient: Melisa Neri Patient : 1945 MRN: [id number]    Last Discharge [address]       Complaint Diagnosis Description Type Department Provider    22 Shortness of Breath; Chest Pain Acute exacerbation of chronic obstructive pulmonary disease (COPD) ([facility] [address] . .. ED to Hosp-Admission (Discharged) (ADMIT) Denise Hawley MD; Piedad Qureshi. .. Was this an external facility discharge? No Discharge Facility: [facility] 2022 to 4/3/2022. CTN reviewed patient chart and attempted to call him for hospital follow up. He was unavailable at the time of the call. Message left introducing myself, explaining the reason for my call and giving my contact information. Requested that patient return my call at his earliest convenience. This is second attempt to contact patient. Will follow.

## 2022-04-05 NOTE — ED NOTES
TRANSFER - OUT REPORT:    Verbal report given to [facility] RN(name) on Zachery Leventhal  being transferred to Tele(unit) for routine progression of care       Report consisted of patients Situation, Background, Assessment and   Recommendations(SBAR). Information from the following report(s) SBAR, ED Summary, Recent Results and Cardiac Rhythm Sinus Tach was reviewed with the receiving nurse. Lines:   Peripheral IV 10/19/19 Left Antecubital (Active)        Opportunity for questions and clarification was provided.       Patient transported with:   Monitor  Registered Nurse 64

## 2022-04-06 ENCOUNTER — APPOINTMENT (OUTPATIENT)
Dept: CT IMAGING | Age: 77
DRG: 872 | End: 2022-04-06
Attending: EMERGENCY MEDICINE
Payer: MEDICARE

## 2022-04-06 VITALS
HEART RATE: 75 BPM | RESPIRATION RATE: 13 BRPM | TEMPERATURE: 98.4 F | SYSTOLIC BLOOD PRESSURE: 152 MMHG | BODY MASS INDEX: 25.17 KG/M2 | OXYGEN SATURATION: 100 % | DIASTOLIC BLOOD PRESSURE: 62 MMHG | WEIGHT: 165.57 LBS

## 2022-04-06 LAB
ABO + RH BLD: NORMAL
APPEARANCE UR: ABNORMAL
BACTERIA URNS QL MICRO: ABNORMAL /HPF
BILIRUB UR QL: NEGATIVE
BLOOD BANK CMNT PATIENT-IMP: NORMAL
BLOOD GROUP ANTIBODIES SERPL: NORMAL
COLOR UR: ABNORMAL
EPITH CASTS URNS QL MICRO: ABNORMAL /LPF (ref 0–5)
GLUCOSE UR STRIP.AUTO-MCNC: NEGATIVE MG/DL
H PYLORI IGA SER-ACNC: <9 UNITS (ref 0–8.9)
H PYLORI IGG SER IA-ACNC: 0.54 INDEX VALUE (ref 0–0.79)
H PYLORI IGM SER-ACNC: <9 UNITS (ref 0–8.9)
HGB UR QL STRIP: NEGATIVE
KETONES UR QL STRIP.AUTO: NEGATIVE MG/DL
LEUKOCYTE ESTERASE UR QL STRIP.AUTO: NEGATIVE
NITRITE UR QL STRIP.AUTO: NEGATIVE
PH UR STRIP: 5 [PH] (ref 5–8)
PROT UR STRIP-MCNC: ABNORMAL MG/DL
RBC #/AREA URNS HPF: NEGATIVE /HPF (ref 0–5)
SP GR UR REFRACTOMETRY: >1.03 (ref 1–1.03)
SPECIMEN EXP DATE BLD: NORMAL
UROBILINOGEN UR QL STRIP.AUTO: 1 EU/DL (ref 0.2–1)
WBC URNS QL MICRO: NEGATIVE /HPF (ref 0–5)

## 2022-04-06 PROCEDURE — 81001 URINALYSIS AUTO W/SCOPE: CPT

## 2022-04-06 PROCEDURE — 74011000636 HC RX REV CODE- 636: Performed by: EMERGENCY MEDICINE

## 2022-04-06 PROCEDURE — 87147 CULTURE TYPE IMMUNOLOGIC: CPT

## 2022-04-06 PROCEDURE — 74177 CT ABD & PELVIS W/CONTRAST: CPT

## 2022-04-06 PROCEDURE — 86900 BLOOD TYPING SEROLOGIC ABO: CPT

## 2022-04-06 PROCEDURE — 87040 BLOOD CULTURE FOR BACTERIA: CPT

## 2022-04-06 PROCEDURE — 87077 CULTURE AEROBIC IDENTIFY: CPT

## 2022-04-06 PROCEDURE — 87186 SC STD MICRODIL/AGAR DIL: CPT

## 2022-04-06 RX ORDER — HYDROCODONE BITARTRATE AND ACETAMINOPHEN 5; 325 MG/1; MG/1
1 TABLET ORAL
Qty: 12 TABLET | Refills: 0 | Status: SHIPPED | OUTPATIENT
Start: 2022-04-06 | End: 2022-04-15

## 2022-04-06 RX ORDER — FAMOTIDINE 20 MG/1
20 TABLET, FILM COATED ORAL 2 TIMES DAILY
Qty: 20 TABLET | Refills: 0 | Status: SHIPPED | OUTPATIENT
Start: 2022-04-06 | End: 2022-04-15

## 2022-04-06 RX ADMIN — IOPAMIDOL 100 ML: 612 INJECTION, SOLUTION INTRAVENOUS at 01:16

## 2022-04-06 NOTE — ED NOTES
Patient is resting with eyes closed. Patient is easily aroused with verbal stimuli. Bed rails up X2. Call bell in reach. No distress noted. No needs voiced.

## 2022-04-06 NOTE — ED PROVIDER NOTES
73-year-old male with multiple medical problems including congestive heart failure CKD COPD hypertension prostate cancer and brain aneurysm presents to the emergency department with abdominal pain. It is very difficult to get a history from him because he is uncomfortable and has difficulty hearing. Patient stated this morning he had pain he points to the top of his stomach no vomiting no diarrhea last bowel movement was yesterday. That is all the history I can obtain for now.            Past Medical History:   Diagnosis Date    BMI 30.0-30.9,adult 4/2/2022    Brain aneurysm     Brain aneurysm     Cancer [facility])     prostate    CHF (congestive heart failure) (HCC)     CKD (chronic kidney disease)     Closed nondisplaced supracondylar fracture of lower end of left femur without intracondylar extension with delayed healing     COPD (chronic obstructive pulmonary disease) ([address]     Debility     History of home oxygen therapy     [place] (hard of hearing)     Hypertension     Nocturia     On home oxygen therapy     PAF (paroxysmal atrial fibrillation) (HCC)     Pneumonia     Prostate CA ([address]     Prostate neoplasm     Radiation effect        Past Surgical History:   Procedure Laterality Date    HX HERNIA REPAIR      HX HIP ARTHROSCOPY  04/09/2021         Family History:   Problem Relation Age of Onset    Heart Disease Mother        Social History     Socioeconomic History    Marital status:      Spouse name: Not on file    Number of children: Not on file    Years of education: Not on file    Highest education level: Not on file   Occupational History    Not on file   Tobacco Use    Smoking status: Former Smoker     Types: Cigarettes    Smokeless tobacco: Never Used   Substance and Sexual Activity    Alcohol use: No    Drug use: Never    Sexual activity: Not on file   Other Topics Concern    Not on file   Social History Narrative    Not on file     Social Determinants of Health Financial Resource Strain:     Difficulty of Paying Living Expenses: Not on file   Food Insecurity:     Worried About Running Out of Food in the Last Year: Not on file    Amaya of Food in the Last Year: Not on file   Transportation Needs:     Lack of Transportation (Medical): Not on file    Lack of Transportation (Non-Medical): Not on file   Physical Activity:     Days of Exercise per Week: Not on file    Minutes of Exercise per Session: Not on file   Stress:     Feeling of Stress : Not on file   Social Connections:     Frequency of Communication with Friends and Family: Not on file    Frequency of Social Gatherings with Friends and Family: Not on file    Attends [employer] Services: Not on file    Active Member of Clubs or Organizations: Not on file    Attends Club or Organization Meetings: Not on file    Marital Status: Not on file   Intimate Partner Violence:     Fear of Current or Ex-Partner: Not on file    Emotionally Abused: Not on file    Physically Abused: Not on file    Sexually Abused: Not on file   Housing Stability:     Unable to Pay for Housing in the Last Year: Not on file    Number of [place] in the Last Year: Not on file    Unstable Housing in the Last Year: Not on file         ALLERGIES: Aspirin and Bactrim [sulfamethoxazole-trimethoprim]    Review of Systems   Unable to perform ROS: Acuity of condition       Vitals:    04/05/22 2221 04/05/22 2257   BP: (!) 165/99    Pulse: 90    Resp: 18    Temp: 98.4 °F (36.9 °C)    SpO2: 100% 100%   Weight: 75.1 kg (165 lb 9.1 oz)             Physical Exam  Vitals and nursing note reviewed. Constitutional:       General: He is in acute distress. Appearance: He is well-developed. HENT:      Head: Normocephalic and atraumatic. Mouth/Throat:      Mouth: Mucous membranes are moist.   Eyes:      Extraocular Movements: Extraocular movements intact. Cardiovascular:      Rate and Rhythm: Normal rate. Rhythm irregular. Heart sounds: Normal heart sounds. No murmur heard. No friction rub. No gallop. Pulmonary:      Effort: Pulmonary effort is normal.   Abdominal:      Palpations: Abdomen is soft. Tenderness: There is generalized abdominal tenderness. Hernia: No hernia is present. Skin:     General: Skin is warm. Capillary Refill: Capillary refill takes less than 2 seconds. Neurological:      General: No focal deficit present. Mental Status: He is alert and oriented to person, place, and time. MDM  Number of Diagnoses or Management Options  Diagnosis management comments: Has unchanged CAT scan is much better after morphine. He is at his baseline. Will make sure he is on a PPI and follow-up with gastroenterology as soon as possible. I will give his name for  to make sure he goes to GI.        Amount and/or Complexity of Data Reviewed  Clinical lab tests: ordered and reviewed  Tests in the radiology section of CPT®: ordered and reviewed    Risk of Complications, Morbidity, and/or Mortality  Presenting problems: moderate  Diagnostic procedures: moderate  Management options: moderate    Patient Progress  Patient progress: stable         Procedures

## 2022-04-06 NOTE — ED NOTES
Patient requested to wait for his wife to pick him up in the am.  He states, \"My wife cannot drive in the dark. \"  Patient resting in bed with lights turned low and warm blanket provided. Patient remains on cardiac monitor while he waits.

## 2022-04-07 ENCOUNTER — HOSPITAL ENCOUNTER (INPATIENT)
Age: 77
LOS: 8 days | Discharge: HOME HEALTH | DRG: 872 | End: 2022-04-15
Attending: EMERGENCY MEDICINE | Admitting: HOSPITALIST
Payer: MEDICARE

## 2022-04-07 ENCOUNTER — APPOINTMENT (OUTPATIENT)
Dept: GENERAL RADIOLOGY | Age: 77
DRG: 872 | End: 2022-04-07
Attending: EMERGENCY MEDICINE
Payer: MEDICARE

## 2022-04-07 DIAGNOSIS — R78.81 BACTEREMIA: Primary | ICD-10-CM

## 2022-04-07 LAB
ALBUMIN SERPL-MCNC: 2.9 G/DL (ref 3.4–5)
ALBUMIN/GLOB SERPL: 0.9 {RATIO} (ref 0.8–1.7)
ALP SERPL-CCNC: 90 U/L (ref 45–117)
ALT SERPL-CCNC: 15 U/L (ref 16–61)
ANION GAP SERPL CALC-SCNC: 5 MMOL/L (ref 3–18)
AST SERPL-CCNC: 9 U/L (ref 10–38)
BASOPHILS # BLD: 0 K/UL (ref 0–0.1)
BASOPHILS NFR BLD: 0 % (ref 0–2)
BILIRUB SERPL-MCNC: 0.3 MG/DL (ref 0.2–1)
BUN SERPL-MCNC: 26 MG/DL (ref 7–18)
BUN/CREAT SERPL: 23 (ref 12–20)
CALCIUM SERPL-MCNC: 7.1 MG/DL (ref 8.5–10.1)
CHLORIDE SERPL-SCNC: 104 MMOL/L (ref 100–111)
CO2 SERPL-SCNC: 32 MMOL/L (ref 21–32)
CREAT SERPL-MCNC: 1.11 MG/DL (ref 0.6–1.3)
DIFFERENTIAL METHOD BLD: ABNORMAL
EOSINOPHIL # BLD: 0.1 K/UL (ref 0–0.4)
EOSINOPHIL NFR BLD: 1 % (ref 0–5)
ERYTHROCYTE [DISTWIDTH] IN BLOOD BY AUTOMATED COUNT: 14.8 % (ref 11.6–14.5)
GLOBULIN SER CALC-MCNC: 3.1 G/DL (ref 2–4)
GLUCOSE SERPL-MCNC: 89 MG/DL (ref 74–99)
HCT VFR BLD AUTO: 33.1 % (ref 36–48)
HGB BLD-MCNC: 10.1 G/DL (ref 13–16)
IMM GRANULOCYTES # BLD AUTO: 0.1 K/UL (ref 0–0.04)
IMM GRANULOCYTES NFR BLD AUTO: 1 % (ref 0–0.5)
LACTATE BLD-SCNC: 1.41 MMOL/L (ref 0.4–2)
LYMPHOCYTES # BLD: 2 K/UL (ref 0.9–3.6)
LYMPHOCYTES NFR BLD: 20 % (ref 21–52)
MCH RBC QN AUTO: 27.1 PG (ref 24–34)
MCHC RBC AUTO-ENTMCNC: 30.5 G/DL (ref 31–37)
MCV RBC AUTO: 88.7 FL (ref 78–100)
MONOCYTES # BLD: 0.6 K/UL (ref 0.05–1.2)
MONOCYTES NFR BLD: 6 % (ref 3–10)
NEUTS SEG # BLD: 7.4 K/UL (ref 1.8–8)
NEUTS SEG NFR BLD: 73 % (ref 40–73)
NRBC # BLD: 0 K/UL (ref 0–0.01)
NRBC BLD-RTO: 0 PER 100 WBC
PLATELET # BLD AUTO: 424 K/UL (ref 135–420)
PMV BLD AUTO: 8.8 FL (ref 9.2–11.8)
POTASSIUM SERPL-SCNC: 3.5 MMOL/L (ref 3.5–5.5)
PROT SERPL-MCNC: 6 G/DL (ref 6.4–8.2)
RBC # BLD AUTO: 3.73 M/UL (ref 4.35–5.65)
SODIUM SERPL-SCNC: 141 MMOL/L (ref 136–145)
WBC # BLD AUTO: 10 K/UL (ref 4.6–13.2)

## 2022-04-07 PROCEDURE — 99285 EMERGENCY DEPT VISIT HI MDM: CPT

## 2022-04-07 PROCEDURE — 83605 ASSAY OF LACTIC ACID: CPT

## 2022-04-07 PROCEDURE — 74011250636 HC RX REV CODE- 250/636: Performed by: EMERGENCY MEDICINE

## 2022-04-07 PROCEDURE — 86677 HELICOBACTER PYLORI ANTIBODY: CPT

## 2022-04-07 PROCEDURE — 80053 COMPREHEN METABOLIC PANEL: CPT

## 2022-04-07 PROCEDURE — 85025 COMPLETE CBC W/AUTO DIFF WBC: CPT

## 2022-04-07 PROCEDURE — 87040 BLOOD CULTURE FOR BACTERIA: CPT

## 2022-04-07 PROCEDURE — 74011250637 HC RX REV CODE- 250/637: Performed by: HOSPITALIST

## 2022-04-07 PROCEDURE — 74011000250 HC RX REV CODE- 250: Performed by: HOSPITALIST

## 2022-04-07 PROCEDURE — 71045 X-RAY EXAM CHEST 1 VIEW: CPT

## 2022-04-07 PROCEDURE — 65270000029 HC RM PRIVATE

## 2022-04-07 PROCEDURE — 94640 AIRWAY INHALATION TREATMENT: CPT

## 2022-04-07 RX ORDER — AMLODIPINE BESYLATE 5 MG/1
10 TABLET ORAL DAILY
Status: DISCONTINUED | OUTPATIENT
Start: 2022-04-07 | End: 2022-04-15 | Stop reason: HOSPADM

## 2022-04-07 RX ORDER — TRAMADOL HYDROCHLORIDE 50 MG/1
50 TABLET ORAL
Status: DISCONTINUED | OUTPATIENT
Start: 2022-04-07 | End: 2022-04-15 | Stop reason: HOSPADM

## 2022-04-07 RX ORDER — ARFORMOTEROL TARTRATE 15 UG/2ML
15 SOLUTION RESPIRATORY (INHALATION) 2 TIMES DAILY
Status: DISCONTINUED | OUTPATIENT
Start: 2022-04-07 | End: 2022-04-07

## 2022-04-07 RX ORDER — ACETAMINOPHEN 325 MG/1
650 TABLET ORAL
Status: DISCONTINUED | OUTPATIENT
Start: 2022-04-07 | End: 2022-04-07 | Stop reason: SDUPTHER

## 2022-04-07 RX ORDER — BUDESONIDE 0.5 MG/2ML
500 INHALANT ORAL
Status: CANCELLED | OUTPATIENT
Start: 2022-04-07

## 2022-04-07 RX ORDER — BUDESONIDE 0.5 MG/2ML
500 INHALANT ORAL 2 TIMES DAILY
Status: DISCONTINUED | OUTPATIENT
Start: 2022-04-07 | End: 2022-04-07

## 2022-04-07 RX ORDER — CHLORTHALIDONE 25 MG/1
25 TABLET ORAL DAILY
Status: DISCONTINUED | OUTPATIENT
Start: 2022-04-08 | End: 2022-04-07

## 2022-04-07 RX ORDER — AMLODIPINE BESYLATE 5 MG/1
5 TABLET ORAL DAILY
Status: DISCONTINUED | OUTPATIENT
Start: 2022-04-08 | End: 2022-04-07

## 2022-04-07 RX ORDER — VANCOMYCIN 1.75 GRAM/500 ML IN 0.9 % SODIUM CHLORIDE INTRAVENOUS
1750 ONCE
Status: COMPLETED | OUTPATIENT
Start: 2022-04-07 | End: 2022-04-07

## 2022-04-07 RX ORDER — SODIUM CHLORIDE 0.9 % (FLUSH) 0.9 %
5-10 SYRINGE (ML) INJECTION AS NEEDED
Status: DISCONTINUED | OUTPATIENT
Start: 2022-04-07 | End: 2022-04-15 | Stop reason: HOSPADM

## 2022-04-07 RX ORDER — IPRATROPIUM BROMIDE AND ALBUTEROL SULFATE 2.5; .5 MG/3ML; MG/3ML
3 SOLUTION RESPIRATORY (INHALATION)
Status: DISCONTINUED | OUTPATIENT
Start: 2022-04-07 | End: 2022-04-15 | Stop reason: HOSPADM

## 2022-04-07 RX ORDER — ARFORMOTEROL TARTRATE 15 UG/2ML
15 SOLUTION RESPIRATORY (INHALATION)
Status: DISCONTINUED | OUTPATIENT
Start: 2022-04-07 | End: 2022-04-15 | Stop reason: HOSPADM

## 2022-04-07 RX ORDER — ACETAMINOPHEN 325 MG/1
650 TABLET ORAL
Status: DISCONTINUED | OUTPATIENT
Start: 2022-04-07 | End: 2022-04-15 | Stop reason: HOSPADM

## 2022-04-07 RX ORDER — BUDESONIDE 0.5 MG/2ML
500 INHALANT ORAL
Status: DISCONTINUED | OUTPATIENT
Start: 2022-04-07 | End: 2022-04-15 | Stop reason: HOSPADM

## 2022-04-07 RX ORDER — CARBOXYMETHYLCELLULOSE SODIUM 5 MG/ML
2 SOLUTION/ DROPS OPHTHALMIC DAILY PRN
Status: DISCONTINUED | OUTPATIENT
Start: 2022-04-07 | End: 2022-04-15 | Stop reason: HOSPADM

## 2022-04-07 RX ORDER — ARFORMOTEROL TARTRATE 15 UG/2ML
15 SOLUTION RESPIRATORY (INHALATION)
Status: CANCELLED | OUTPATIENT
Start: 2022-04-07

## 2022-04-07 RX ORDER — CHLORTHALIDONE 25 MG/1
25 TABLET ORAL DAILY
Status: DISCONTINUED | OUTPATIENT
Start: 2022-04-07 | End: 2022-04-11

## 2022-04-07 RX ORDER — GABAPENTIN 100 MG/1
100 CAPSULE ORAL 3 TIMES DAILY
Status: DISCONTINUED | OUTPATIENT
Start: 2022-04-07 | End: 2022-04-15

## 2022-04-07 RX ORDER — TAMSULOSIN HYDROCHLORIDE 0.4 MG/1
0.4 CAPSULE ORAL EVERY EVENING
Status: DISCONTINUED | OUTPATIENT
Start: 2022-04-07 | End: 2022-04-15 | Stop reason: HOSPADM

## 2022-04-07 RX ORDER — FUROSEMIDE 20 MG/1
20 TABLET ORAL DAILY
Status: DISCONTINUED | OUTPATIENT
Start: 2022-04-08 | End: 2022-04-14

## 2022-04-07 RX ORDER — ONDANSETRON 2 MG/ML
4 INJECTION INTRAMUSCULAR; INTRAVENOUS
Status: DISCONTINUED | OUTPATIENT
Start: 2022-04-07 | End: 2022-04-15 | Stop reason: HOSPADM

## 2022-04-07 RX ORDER — VANCOMYCIN HYDROCHLORIDE
1250 EVERY 24 HOURS
Status: DISCONTINUED | OUTPATIENT
Start: 2022-04-08 | End: 2022-04-14 | Stop reason: DRUGHIGH

## 2022-04-07 RX ORDER — DILTIAZEM HYDROCHLORIDE 30 MG/1
30 TABLET, FILM COATED ORAL DAILY
COMMUNITY
End: 2022-04-15

## 2022-04-07 RX ORDER — ENOXAPARIN SODIUM 100 MG/ML
40 INJECTION SUBCUTANEOUS EVERY 24 HOURS
Status: DISCONTINUED | OUTPATIENT
Start: 2022-04-07 | End: 2022-04-07

## 2022-04-07 RX ADMIN — GABAPENTIN 100 MG: 100 CAPSULE ORAL at 21:51

## 2022-04-07 RX ADMIN — APIXABAN 5 MG: 5 TABLET, FILM COATED ORAL at 12:08

## 2022-04-07 RX ADMIN — GABAPENTIN 100 MG: 100 CAPSULE ORAL at 17:30

## 2022-04-07 RX ADMIN — ARFORMOTEROL TARTRATE 15 MCG: 15 SOLUTION RESPIRATORY (INHALATION) at 20:52

## 2022-04-07 RX ADMIN — ARFORMOTEROL TARTRATE 15 MCG: 15 SOLUTION RESPIRATORY (INHALATION) at 12:08

## 2022-04-07 RX ADMIN — BUDESONIDE 500 MCG: 0.5 INHALANT RESPIRATORY (INHALATION) at 20:52

## 2022-04-07 RX ADMIN — CHLORTHALIDONE 25 MG: 25 TABLET ORAL at 17:30

## 2022-04-07 RX ADMIN — BUDESONIDE 500 MCG: 0.5 INHALANT RESPIRATORY (INHALATION) at 12:08

## 2022-04-07 RX ADMIN — AMLODIPINE BESYLATE 10 MG: 5 TABLET ORAL at 17:30

## 2022-04-07 RX ADMIN — TAMSULOSIN HYDROCHLORIDE 0.4 MG: 0.4 CAPSULE ORAL at 17:30

## 2022-04-07 RX ADMIN — VANCOMYCIN HYDROCHLORIDE 1750 MG: 10 INJECTION, POWDER, LYOPHILIZED, FOR SOLUTION INTRAVENOUS at 12:55

## 2022-04-07 NOTE — PROGRESS NOTES
Patient arrived to unit into bed 319. He is alert and oriented at this time,very hard of hearing. He denies pain. Patient sitting at edge of bed wearing 2L via nasal cannula, he is in no acute distress, will continue to monitor. Report received from [address] [place] [address].

## 2022-04-07 NOTE — H&P
[facility] [place]  HISTORY AND PHYSICAL    Name:  Monique Gilmore  MR#:   [id number]  :  1945  ACCOUNT #:  [de-identified]  ADMIT DATE:  2022    ADMITTING DIAGNOSIS:  Bacteremia. HISTORY OF PRESENT ILLNESS:  This 78-year-old gentleman recently discharged from the hospital on . He was admitted for a COPD exacerbation. During that time, there were no gastrointestinal issues. He responded quickly to steroids, nebulizer treatments, and was able to discharge on the  after being admitted on the .  This patient has chronic AFib, on Eliquis. He has COPD, on home oxygen. He has partial deafness and history of brain aneurysm. With that, the patient came back to the hospital on  with abdominal pain. He had a CT scan that showed duodenitis, possible ulcer, but no perforation. He was recommended to go on medication therapy and follow up with GI as an outpatient as he got better after treatment in the emergency room, and with that he was called back today because blood cultures that had been done from this recent visit came back with MRSA. The patient is actually feeling okay today. He has had no nausea or vomiting, no blood in the stool. Intermittent abdominal discomfort, but nothing serious in the last 24 hours. He is taking his medications as prescribed. He has had no myalgias or chills. PCP is Dr. Krsi Peres. PAST MEDICAL HISTORY:  1. COPD. 2.  Brain aneurysm. 3.  Chronic kidney disease. 4.  Chronic oxygen dependence. 5.  Hypertension. 6.  Paroxysmal AFib. 7.  Prostate cancer. 8.  Congestive heart failure, diastolic. FAMILY HISTORY:  Heart disease. PAST SURGICAL HISTORY:  1. Hernia repair. 2.  Hip surgery. SOCIAL HISTORY:  Former smoker, has quit for a number of years. Lives at home with his wife. He is on chronic oxygen therapy. No alcohol use. ALLERGIES:  HE HAS ALLERGIES TO ASPIRIN AND BACTRIM.     MEDICATIONS:  His meds that he is taking at home include the followin. Eliquis. 2.  Norvasc. 3.  Brovana. 4.  Pulmicort. 5.  Hygroton. 6.  Pepcid. 7.  Iron sulfate. 8.  Lasix. 9.  Neurontin. 10.  He was on a prednisone taper. 11.  He has Flomax and tramadol. REVIEW OF SYSTEMS:  GENERAL:  No fevers or chills. RESPIRATORY:  No worsening wheezing, coughing, or congestion recently. CARDIOVASCULAR:  No chest pain or palpitations or leg swelling. GASTROINTESTINAL:  No nausea or vomiting. No blood in the stool. No hematemesis. Mid abdominal pain that prompted his ER visit the other day, but that has been better. No change in his bowel habits. No constipation. GENITOURINARY:  No dysuria or hematuria. MUSCULOSKELETAL:  No new myalgias or arthralgias. PHYSICAL EXAMINATION:  GENERAL:  This gentleman is 64years old elderly  male, in no acute distress, hard of hearing, but pleasant and cooperative. VITAL SIGNS:  Blood pressure 164/99, pulse 68, temperature 98.7, respiratory rate 22, SaO2 is 95%. He is on 2 L.  ABDOMEN:  Soft. No distention or peritoneal signs. Few normal bowel sounds. No ascites or mass effect. LUNGS:  Clear. CARDIAC:  Irregular rhythm, regular rate. No murmur noted. EXTREMITIES:  Lower extremities are without edema. NEUROLOGIC:  Mentation is appropriate. LABORATORY DATA:  EKG from six days ago showed normal sinus rhythm. His H and H today are 10.1 and 33.1. White count and platelet count are acceptable. Chemistry is within normal range. BUN 26. LFTs are unremarkable. Blood cultures from  came back with MRSA x2 bottles, repeats have been done today. Infectious Disease was consulted from the emergency room. Chest x-ray shows no acute cardiopulmonary disease. CT abdomen and pelvis done yesterday on his ER visit showed persistent inflammatory stranding associated with descending duodenum concerning for duodenitis, possible underlying ulcer without perforation.   There is a calcification or high-density ingested body again noted along the medial aspect of the duodenum in this region, possibly within a duodenal diverticulum. The patient's last echocardiogram was 02/03 that showed an ejection fraction of 50 to 55%. ASSESSMENT:  1.  Methicillin-resistant Staphylococcus aureus bacteremia. 2.  Duodenitis with possible foreign body in a diverticulum. 3.  Chronic obstructive pulmonary disease, chronic oxygen dependence. 4.  Chronic diastolic heart failure. 5.  Chronic atrial fibrillation. 6.  Chronic kidney disease, stage III. PLAN:  IV vancomycin, pharmacy to dose. Infectious Disease consult. Continue his home medications. Hold Eliquis for now in case he needs Endoscopy shortly. I think, he will consult with GI. We will send an enteric bacterial panel. Plan echocardiogram to be repeated due to the bacteremia. Repeat blood cultures have been done in the emergency room and admission to the medical unit. Continue oxygen therapy. He is a Do Not Resuscitate. Anticipated length of stay three to five days.       Castillo Saleh MD      [id number]  D:  04/07/2022 12:20  T:  04/07/2022 14:49  JOB #:  [id number]

## 2022-04-07 NOTE — ED PROVIDER NOTES
EMERGENCY DEPARTMENT HISTORY AND PHYSICAL EXAM    Date: 4/7/2022  Patient Name: Kan Lorenzana    History of Presenting Illness     Chief Complaint   Patient presents with    Abnormal Lab Results         History Provided By: Patient and Patient's Wife    10:25 AM  Kan Lorenzana is a 64 y.o. male with PMHX of COPD on home oxygen, CKD who presents to the emergency department C/O to return for positive blood cultures. Per patient's wife he was hospitalized over the weekend for COPD exacerbation. She reports he returned to the ED on Tuesday, 2 days ago for abdominal pain. She reports they went home early Wednesday morning and he been doing well but was called today and told to return for positive blood cultures. Patient and wife deny any fever, nausea, vomiting, diarrhea, urinary complaints, continued abdominal pain, chest pain, shortness of breath. No clear leaving or exacerbating factors identified. Rafita Hull      PCP: Shelia Phipps MD    Current Facility-Administered Medications   Medication Dose Route Frequency Provider Last Rate Last Admin    sodium chloride (NS) flush 5-10 mL  5-10 mL IntraVENous PRN Bradford Escalera MD        [START ON 4/8/2022] pantoprazole (PROTONIX) 40 mg in 0.9% sodium chloride 10 mL injection  40 mg IntraVENous DAILY Ye Martinez MD        albuterol-ipratropium (DUO-NEB) 2.5 MG-0.5 MG/3 ML  3 mL Nebulization Q4H PRN Ye Martinez MD        [facility] ON 4/8/2022] amLODIPine (NORVASC) tablet 5 mg  5 mg Oral DAILY Ye Martinez MD        [Held by provider] apixaban Isabela Ocean) tablet 5 mg  5 mg Oral BID Ye Martinez MD   5 mg at 04/07/22 1208    [START ON 4/8/2022] chlorthalidone (HYGROTON) tablet 25 mg  25 mg Oral DAILY Ye Martinez MD        [START ON 4/8/2022] furosemide (LASIX) tablet 20 mg  20 mg Oral DAILY Ye Martinez MD        gabapentin (NEURONTIN) capsule 100 mg  100 mg Oral TID Ye Martinez MD        tamsulosin [facility]) capsule 0.4 mg  0.4 mg Oral QPM Ye Martinez MD        traMADoL Adriano Lacy) tablet 50 mg  50 mg Oral Q8H PRN Jc Sheppard MD        acetaminophen (TYLENOL) tablet 650 mg  650 mg Oral Q6H PRN Jc Sheppard MD        ondansetron [facility]) injection 4 mg  4 mg IntraVENous Q6H PRN Jc Sheppard MD        vancomycin (VANCOCIN) 1750 mg in  ml infusion  1,750 mg IntraVENous ONCE Anest, Coutr Drake  mL/hr at 04/07/22 1255 1,750 mg at 04/07/22 1255    [START ON 4/9/2022] Vancomycin Random level to be drawn on 4/9 @ 1100  1 Each Other ONCE Jc Sheppard MD        carboxymethylcellulose sodium (REFRESH PLUS) 0.5 % ophthalmic solution 2 Drop  2 Drop Both Eyes DAILY PRN Jc Sheppard MD        [START ON 4/8/2022] vancomycin (VANCOCIN) 1250 mg in  ml infusion  1,250 mg IntraVENous Q24H Jc Sheppard MD        Vancomycin Pharmacy To Dose  1 Each Other Rx Dosing/Monitoring Jc Sheppard MD        arformoteroL [facility]) neb solution 15 mcg  15 mcg Nebulization BID RT Jc Sheppard MD        budesonide (PULMICORT) 500 mcg/2 ml nebulizer suspension  500 mcg Nebulization BID RT Jc Sheppard MD           Past History       Past Medical History:  Past Medical History:   Diagnosis Date    BMI 30.0-30.9,adult 4/2/2022    Brain aneurysm     Brain aneurysm     Cancer [facility])     prostate    CHF (congestive heart failure) (HCC)     CKD (chronic kidney disease)     Closed nondisplaced supracondylar fracture of lower end of left femur without intracondylar extension with delayed healing     COPD (chronic obstructive pulmonary disease) ([address]     Debility     History of home oxygen therapy     [place]hard of hearing[place]     Hypertension     Nocturia     On home oxygen therapy     PAF (paroxysmal atrial fibrillation) ([address]     Pneumonia     Prostate CA ([address]     Prostate neoplasm     Radiation effect        Past Surgical History:  Past Surgical History:   Procedure Laterality Date    HX HERNIA REPAIR      HX HIP ARTHROSCOPY  04/09/2021 Family History:  Family History   Problem Relation Age of Onset    Heart Disease Mother        Social History:  Social History     Tobacco Use    Smoking status: Former Smoker     Types: Cigarettes    Smokeless tobacco: Never Used   Substance Use Topics    Alcohol use: No    Drug use: Never       Allergies: Allergies   Allergen Reactions    Aspirin Other (comments)     \"messes my stomach up\"    Bactrim [Sulfamethoxazole-Trimethoprim] Unknown (comments)         Review of Systems   Review of Systems   Constitutional: Negative for fever. Respiratory: Negative for shortness of breath. Cardiovascular: Negative for chest pain. Gastrointestinal: Negative for abdominal pain. All other systems reviewed and are negative.         Physical Exam     Vitals:    04/07/22 1202 04/07/22 1203 04/07/22 1251 04/07/22 1438   BP:   (!) 176/78    Pulse: 72 68 60    Resp: 18 22 21    Temp:   97.8 °F (36.6 °C)    SpO2: 100% 95% 98% 98%   Weight:         Physical Exam    Nursing notes and vital signs reviewed    Constitutional: Non toxic appearing, moderate distress  Head: Normocephalic, Atraumatic  Eyes: EOMI  Neck: Supple  Cardiovascular: Regular rate and rhythm, no murmurs, rubs, or gallops  Chest: Normal work of breathing and chest excursion bilaterally  Lungs: Clear to ausculation bilaterally  Abdomen: Soft, tender across the lower abdomen without rebound or guarding, otherwise nontender, non distended, normoactive bowel sounds  Back: No evidence of trauma or deformity  Extremities: No evidence of trauma or deformity  Skin: Warm and dry, normal cap refill  Neuro: Alert and appropriate  Psychiatric: Normal mood and affect      Diagnostic Study Results     Labs -     Recent Results (from the past 12 hour(s))   METABOLIC PANEL, COMPREHENSIVE    Collection Time: 04/07/22 10:22 AM   Result Value Ref Range    Sodium 141 136 - 145 mmol/L    Potassium 3.5 3.5 - 5.5 mmol/L    Chloride 104 100 - 111 mmol/L    CO2 32 21 - 32 mmol/L    Anion gap 5 3.0 - 18 mmol/L    Glucose 89 74 - 99 mg/dL    BUN 26 (H) 7.0 - 18 MG/DL    Creatinine 1.11 0.6 - 1.3 MG/DL    BUN/Creatinine ratio 23 (H) 12 - 20      GFR est AA >60 >60 ml/min/1.73m2    GFR est non-AA >60 >60 ml/min/1.73m2    Calcium 7.1 (L) 8.5 - 10.1 MG/DL    Bilirubin, total 0.3 0.2 - 1.0 MG/DL    ALT (SGPT) 15 (L) 16 - 61 U/L    AST (SGOT) 9 (L) 10 - 38 U/L    Alk. phosphatase 90 45 - 117 U/L    Protein, total 6.0 (L) 6.4 - 8.2 g/dL    Albumin 2.9 (L) 3.4 - 5.0 g/dL    Globulin 3.1 2.0 - 4.0 g/dL    A-G Ratio 0.9 0.8 - 1.7     CBC WITH AUTOMATED DIFF    Collection Time: 04/07/22 10:22 AM   Result Value Ref Range    WBC 10.0 4.6 - 13.2 K/uL    RBC 3.73 (L) 4.35 - 5.65 M/uL    HGB 10.1 (L) 13.0 - 16.0 g/dL    HCT 33.1 (L) 36.0 - 48.0 %    MCV 88.7 78.0 - 100.0 FL    MCH 27.1 24.0 - 34.0 PG    MCHC 30.5 (L) 31.0 - 37.0 g/dL    RDW 14.8 (H) 11.6 - 14.5 %    PLATELET 990 (H) 473 - 420 K/uL    MPV 8.8 (L) 9.2 - 11.8 FL    NRBC 0.0 0  WBC    ABSOLUTE NRBC 0.00 0.00 - 0.01 K/uL    NEUTROPHILS 73 40 - 73 %    LYMPHOCYTES 20 (L) 21 - 52 %    MONOCYTES 6 3 - 10 %    EOSINOPHILS 1 0 - 5 %    BASOPHILS 0 0 - 2 %    IMMATURE GRANULOCYTES 1 (H) 0.0 - 0.5 %    ABS. NEUTROPHILS 7.4 1.8 - 8.0 K/UL    ABS. LYMPHOCYTES 2.0 0.9 - 3.6 K/UL    ABS. MONOCYTES 0.6 0.05 - 1.2 K/UL    ABS. EOSINOPHILS 0.1 0.0 - 0.4 K/UL    ABS. BASOPHILS 0.0 0.0 - 0.1 K/UL    ABS. IMM. GRANS. 0.1 (H) 0.00 - 0.04 K/UL    DF AUTOMATED     POC LACTIC ACID    Collection Time: 04/07/22 10:26 AM   Result Value Ref Range    Lactic Acid (POC) 1.41 0.40 - 2.00 mmol/L       Radiologic Studies -   XR CHEST PORT   Final Result      1. Stable plain film appearance, without evidence of acute cardiopulmonary   disease, allowing for technique.            CT Results  (Last 48 hours)               04/06/22 0055  CT ABD PELV W CONT Final result    Impression:      There is persistent inflammatory stranding associated with the descending duodenum concerning for duodenitis, possible underlying ulcer without   perforation. Calcification or high density ingested body is again noted along   the medial aspect of the duodenum in this region, possibly within a duodenal   diverticulum. Additional partially gas-filled duodenal diverticulum noted. Narrative:  EXAM: CT of the abdomen and pelvis       INDICATION: Abdominal pain       COMPARISON: April 1, 2022       TECHNIQUE: Axial CT imaging of the abdomen and pelvis was performed with   intravenous contrast. Multiplanar reformats were generated. One or more dose   reduction techniques were used on this CT: automated exposure control,   adjustment of the mAs and/or kVp according to patient size, and iterative   reconstruction techniques. The specific techniques used on this CT exam have   been documented in the patient's electronic medical record. Digital Imaging and   Communications in the Medicine (DICOM) format image data are available to   nonaffiliated external healthcare facilities or entities on a secure, media   free, reciprocally searchable basis with patient authorization for at least a 12   month period after this study. _______________       FINDINGS:       LOWER CHEST: Chronic peripherally calcified right basilar pleural collection   without change. Mild regions of basilar atelectasis. Coarse pleural-based   calcifications right middle lobe region without change. LIVER, BILIARY: Calcified granulomas, liver otherwise unremarkable. Unremarkable   gallbladder. No biliary tract dilatation. No biliary dilation. Gallbladder is   unremarkable. PANCREAS: No focal pancreatic modalities. Stranding adjacent to the pancreatic   head, periduodenal regions again noted. SPLEEN: Unremarkable. ADRENALS: Unremarkable. KIDNEYS: Renal cysts, kidneys are without acute findings. LYMPH NODES: No enlarged lymph nodes.        GASTROINTESTINAL TRACT: There is persistent inflammatory stranding associated   with the descending duodenum concerning for duodenitis, possible underlying   ulcer without perforation. Calcification or high density ingested body is again   noted along the medial aspect of the duodenum in this region, possibly within a   duodenal diverticulum. Additional partially gas-filled duodenal diverticulum   noted. No bowel obstruction. Unremarkable appendix. Noninflamed colonic diverticula are   present. PELVIC ORGANS: Atherosclerosis. VASCULATURE: Unremarkable. BONES: No acute or aggressive osseous abnormalities identified. Nothing acute. Left hip prosthetic. Previous fracture of the upper sacrum. OTHER: None.       _______________               CXR Results  (Last 48 hours)               04/07/22 1050  XR CHEST PORT Final result    Impression:      1. Stable plain film appearance, without evidence of acute cardiopulmonary   disease, allowing for technique. Narrative:  HISTORY:    -Provided with order: meets SIRS criteria   -Additional: None       Technique : AP PORTABLE CHEST       Comparison : 04/01/22        FINDINGS:       HEART AND MEDIASTINUM: Stable in visualized extent. LUNGS AND PLEURAL SPACES: There is chronic elevation of the right diaphragm,   blunting of right costophrenic angle, right pleural thickening and   calcification, with ill-defined right mid and lower lung zone opacities. Right   apical thickening. No acute consolidation, congestive heart failure, pleural   effusion or pneumothorax is seen. BONY THORAX AND SOFT TISSUES: No acute osseous abnormality.                  Medications given in the ED-  Medications   sodium chloride (NS) flush 5-10 mL (has no administration in time range)   pantoprazole (PROTONIX) 40 mg in 0.9% sodium chloride 10 mL injection (has no administration in time range)   albuterol-ipratropium (DUO-NEB) 2.5 MG-0.5 MG/3 ML (has no administration in time range)   amLODIPine (NORVASC) tablet 5 mg (has no administration in time range)   apixaban (ELIQUIS) tablet 5 mg ( Oral Automatically Held 4/21/22 2100)   chlorthalidone (HYGROTON) tablet 25 mg (has no administration in time range)   furosemide (LASIX) tablet 20 mg (has no administration in time range)   gabapentin (NEURONTIN) capsule 100 mg (has no administration in time range)   tamsulosin (FLOMAX) capsule 0.4 mg (has no administration in time range)   traMADoL (ULTRAM) tablet 50 mg (has no administration in time range)   acetaminophen (TYLENOL) tablet 650 mg (has no administration in time range)   ondansetron (ZOFRAN) injection 4 mg (has no administration in time range)   vancomycin (VANCOCIN) 1750 mg in  ml infusion (1,750 mg IntraVENous New Bag 4/7/22 1255)   Vancomycin Random level to be drawn on 4/9 @ 1100 (has no administration in time range)   carboxymethylcellulose sodium (REFRESH PLUS) 0.5 % ophthalmic solution 2 Drop (has no administration in time range)   vancomycin (VANCOCIN) 1250 mg in  ml infusion (has no administration in time range)   Vancomycin Pharmacy To Dose (has no administration in time range)   arformoteroL (BROVANA) neb solution 15 mcg (has no administration in time range)   budesonide (PULMICORT) 500 mcg/2 ml nebulizer suspension (has no administration in time range)         Medical Decision Making   I am the first provider for this patient. I reviewed the vital signs, available nursing notes, past medical history, past surgical history, family history and social history. Vital Signs-Reviewed the patient's vital signs. Pulse Oximetry Analysis - 98% on room air, not hypoxic     Cardiac Monitor:  Rate: 64 bpm  Rhythm: Normal Sinus    Records Reviewed: Nursing Notes and Old Medical Records    Provider Notes (Medical Decision Making): Navi Sen is a 64 y.o. male returned to the ED for positive blood culture.   Patient has been gram-positive cocci in 3 out of 4 blood cultures from prior ED visit. No obvious source for this bacteremia but does not seem to be a contaminant. Patient is hemodynamically stable. Repeat blood cultures were sent. Discussed with infectious disease and vancomycin initiated. Discussed with hospitalist for further in-hospital valuation management. Patient is wife understand and agree with this plan. Procedures:  Procedures    ED Course:   CONSULT NOTE:   11:18 AM  Dr. Ellie Calle spoke with Dr. Janell Lewis   Specialty: ID  Discussed pt's hx, disposition, and available diagnostic and imaging results over the telephone. Reviewed care plans. Recommends admission for IV antibiotics, repeat cultures and start vanc, will consult. 11:23 AM  Updated patient on all results and plan. All questions answered. CONSULT NOTE:   11:29 AM  Dr. Ellie Calle spoke with Dr. Kai Juarez   Specialty: Hospitalist  Discussed pt's hx, disposition, and available diagnostic and imaging results over the telephone. Reviewed care plans. Accepts to medical.       Diagnosis and Disposition     Critical Care Time: None    Core Measures:  For Hospitalized Patients:    1. Hospitalization Decision Time:  The decision to hospitalize the patient was made by Dr. Ellie Calle at 11:18 AM on 4/7/2022    2. Aspirin: Aspirin was not given because the patient did not present with a stroke at the time of their Emergency Department evaluation    11:19 AM  Patient is being admitted to the hospital by Dr. Merry Serrano. The results of their tests and reasons for their admission have been discussed with them and/or available family. They convey agreement and understanding for the need to be admitted and for their admission diagnosis. CONDITIONS ON ADMISSION:  Sepsis maybe present at the time of admission. Deep Vein Thrombosis is not present at the time of admission. Thrombosis is not present at the time of admission. Urinary Tract Infection is not present at the time of admission.  Pneumonia is not present at the time of admission. MRSA is present at the time of admission. Wound infection is not present at the time of admission. Pressure Ulcer is not present at the time of admission. CLINICAL IMPRESSION:    1. Bacteremia      _______________________________      Please note that this dictation was completed with MOGL, the computer voice recognition software. Quite often unanticipated grammatical, syntax, homophones, and other interpretive errors are inadvertently transcribed by the computer software. Please disregard these errors. Please excuse any errors that have escaped final proofreading.

## 2022-04-07 NOTE — CONSULTS
Brief [facility] ID Note    Admitted out of ED due to call back BCx (+) MRSA (two sites)  Chart(s) reviewed/pt NOT seen yet (will do so tomorrow). Has been in/out/in/out of hospital multiple times in past 3m mostly due to COPD exacerbations - multiple IVs.    MRSA colonization skin well-known, but (+) BCx quickly at around 17-18h growth from 2 different sites/arms makes this serious. Had more BCx drawn today in ED and started on IV vancomycin whilst we figure this out. Definitely needs TTE, and if that is negative, NATY before closing the book on this. IF TTE (+), stop. We have our dx then.     Armida Cervantes MD  Cell [phone]  [facility]

## 2022-04-07 NOTE — ED NOTES
TRANSFER - OUT REPORT:    Verbal report given to 3S(name) on Loc Jackson  being transferred to medical(unit) for routine progression of care       Report consisted of patients Situation, Background, Assessment and   Recommendations(SBAR). Information from the following report(s) SBAR, ED Summary, [facility] and Recent Results was reviewed with the receiving nurse. Lines:       Opportunity for questions and clarification was provided.       Patient transported with:   Tech   2L NC

## 2022-04-07 NOTE — PROGRESS NOTES
Problem: Falls - Risk of  Goal: *Absence of Falls  Description: Document Darvin Shane Fall Risk and appropriate interventions in the flowsheet.   Outcome: Progressing Towards Goal  Note: Fall Risk Interventions:                 Elimination Interventions: Call light in reach              Problem: Patient Education: Go to Patient Education Activity  Goal: Patient/Family Education  Outcome: Progressing Towards Goal     Problem: Pain  Goal: *Control of Pain  Outcome: Progressing Towards Goal  Goal: *PALLIATIVE CARE:  Alleviation of Pain  Outcome: Progressing Towards Goal     Problem: Patient Education: Go to Patient Education Activity  Goal: Patient/Family Education  Outcome: Progressing Towards Goal     Problem: Injury - Risk of, Adverse Drug Event  Goal: *Absence of adverse drug events  Outcome: Progressing Towards Goal  Goal: *Absence of medication errors  Outcome: Progressing Towards Goal  Goal: *Knowledge of prescribed medications  Outcome: Progressing Towards Goal     Problem: Patient Education: Go to Patient Education Activity  Goal: Patient/Family Education  Outcome: Progressing Towards Goal

## 2022-04-08 ENCOUNTER — APPOINTMENT (OUTPATIENT)
Dept: NON INVASIVE DIAGNOSTICS | Age: 77
DRG: 872 | End: 2022-04-08
Attending: HOSPITALIST
Payer: MEDICARE

## 2022-04-08 PROBLEM — B95.62 MRSA BACTEREMIA: Status: ACTIVE | Noted: 2022-04-07

## 2022-04-08 LAB
ALBUMIN SERPL-MCNC: 2.1 G/DL (ref 3.4–5)
ALBUMIN/GLOB SERPL: 0.8 {RATIO} (ref 0.8–1.7)
ALP SERPL-CCNC: 63 U/L (ref 45–117)
ALT SERPL-CCNC: 14 U/L (ref 16–61)
ANION GAP SERPL CALC-SCNC: 2 MMOL/L (ref 3–18)
AST SERPL-CCNC: 11 U/L (ref 10–38)
BACTERIA SPEC CULT: ABNORMAL
BASOPHILS # BLD: 0.1 K/UL (ref 0–0.1)
BASOPHILS NFR BLD: 1 % (ref 0–2)
BILIRUB SERPL-MCNC: 0.2 MG/DL (ref 0.2–1)
BUN SERPL-MCNC: 28 MG/DL (ref 7–18)
BUN/CREAT SERPL: 25 (ref 12–20)
CALCIUM SERPL-MCNC: 6.4 MG/DL (ref 8.5–10.1)
CHLORIDE SERPL-SCNC: 105 MMOL/L (ref 100–111)
CO2 SERPL-SCNC: 33 MMOL/L (ref 21–32)
CREAT SERPL-MCNC: 1.1 MG/DL (ref 0.6–1.3)
DIFFERENTIAL METHOD BLD: ABNORMAL
ECHO AO ROOT DIAM: 3 CM
ECHO AO ROOT INDEX: 1.6 CM/M2
ECHO AV AREA PEAK VELOCITY: 1.8 CM2
ECHO AV AREA VTI: 1.9 CM2
ECHO AV AREA/BSA PEAK VELOCITY: 1 CM2/M2
ECHO AV AREA/BSA VTI: 1 CM2/M2
ECHO AV MEAN GRADIENT: 6 MMHG
ECHO AV MEAN VELOCITY: 1.1 M/S
ECHO AV PEAK GRADIENT: 11 MMHG
ECHO AV PEAK VELOCITY: 1.7 M/S
ECHO AV VELOCITY RATIO: 0.59
ECHO AV VTI: 36.8 CM
ECHO LA VOL 2C: 88 ML (ref 18–58)
ECHO LA VOL 4C: 65 ML (ref 18–58)
ECHO LA VOL BP: 76 ML (ref 18–58)
ECHO LA VOL/BSA BIPLANE: 40 ML/M2 (ref 16–34)
ECHO LA VOLUME AREA LENGTH: 83 ML
ECHO LA VOLUME INDEX A2C: 47 ML/M2 (ref 16–34)
ECHO LA VOLUME INDEX A4C: 35 ML/M2 (ref 16–34)
ECHO LA VOLUME INDEX AREA LENGTH: 44 ML/M2 (ref 16–34)
ECHO LV E' LATERAL VELOCITY: 12 CM/S
ECHO LV E' SEPTAL VELOCITY: 9 CM/S
ECHO LV EDV A2C: 74 ML
ECHO LV EDV A4C: 95 ML
ECHO LV EDV BP: 87 ML (ref 67–155)
ECHO LV EDV INDEX A4C: 51 ML/M2
ECHO LV EDV INDEX BP: 46 ML/M2
ECHO LV EDV NDEX A2C: 39 ML/M2
ECHO LV EJECTION FRACTION A2C: 55 %
ECHO LV EJECTION FRACTION A4C: 47 %
ECHO LV EJECTION FRACTION BIPLANE: 47 % (ref 55–100)
ECHO LV ESV A2C: 33 ML
ECHO LV ESV A4C: 50 ML
ECHO LV ESV BP: 46 ML (ref 22–58)
ECHO LV ESV INDEX A2C: 18 ML/M2
ECHO LV ESV INDEX A4C: 27 ML/M2
ECHO LV ESV INDEX BP: 24 ML/M2
ECHO LV IVSD: 0.8 CM (ref 0.6–1)
ECHO LV POSTERIOR WALL DIASTOLIC: 0.9 CM (ref 0.6–1)
ECHO LVOT AREA: 3.1 CM2
ECHO LVOT AV VTI INDEX: 0.63
ECHO LVOT CARDIAC OUTPUT: 0 LITER/MINUTE
ECHO LVOT DIAM: 2 CM
ECHO LVOT MEAN GRADIENT: 2 MMHG
ECHO LVOT PEAK GRADIENT: 4 MMHG
ECHO LVOT PEAK VELOCITY: 1 M/S
ECHO LVOT STROKE VOLUME INDEX: 38.7 ML/M2
ECHO LVOT SV: 72.8 ML
ECHO LVOT VTI: 23.2 CM
ECHO MV A VELOCITY: 0.91 M/S
ECHO MV E DECELERATION TIME (DT): 384 MS
ECHO MV E VELOCITY: 0.78 M/S
ECHO MV E/A RATIO: 0.86
ECHO MV E/E' LATERAL: 6.5
ECHO MV E/E' RATIO (AVERAGED): 7.58
ECHO MV E/E' SEPTAL: 8.67
ECHO RV INTERNAL DIMENSION: 4.1 CM
ECHO RV TAPSE: 1.6 CM (ref 1.7–?)
ECHO TV REGURGITANT MAX VELOCITY: 1.63 M/S
ECHO TV REGURGITANT PEAK GRADIENT: 11 MMHG
EOSINOPHIL # BLD: 0.2 K/UL (ref 0–0.4)
EOSINOPHIL NFR BLD: 3 % (ref 0–5)
ERYTHROCYTE [DISTWIDTH] IN BLOOD BY AUTOMATED COUNT: 15 % (ref 11.6–14.5)
GLOBULIN SER CALC-MCNC: 2.7 G/DL (ref 2–4)
GLUCOSE SERPL-MCNC: 98 MG/DL (ref 74–99)
GRAM STN SPEC: ABNORMAL
HCT VFR BLD AUTO: 24 % (ref 36–48)
HGB BLD-MCNC: 7.5 G/DL (ref 13–16)
IMM GRANULOCYTES # BLD AUTO: 0.1 K/UL (ref 0–0.04)
IMM GRANULOCYTES NFR BLD AUTO: 1 % (ref 0–0.5)
IRON SATN MFR SERPL: 18 % (ref 20–50)
IRON SERPL-MCNC: 45 UG/DL (ref 50–175)
LYMPHOCYTES # BLD: 1.5 K/UL (ref 0.9–3.6)
LYMPHOCYTES NFR BLD: 19 % (ref 21–52)
MCH RBC QN AUTO: 27.4 PG (ref 24–34)
MCHC RBC AUTO-ENTMCNC: 31.3 G/DL (ref 31–37)
MCV RBC AUTO: 87.6 FL (ref 78–100)
MONOCYTES # BLD: 0.5 K/UL (ref 0.05–1.2)
MONOCYTES NFR BLD: 7 % (ref 3–10)
NEUTS SEG # BLD: 5.3 K/UL (ref 1.8–8)
NEUTS SEG NFR BLD: 69 % (ref 40–73)
NRBC # BLD: 0 K/UL (ref 0–0.01)
NRBC BLD-RTO: 0 PER 100 WBC
PLATELET # BLD AUTO: 318 K/UL (ref 135–420)
PMV BLD AUTO: 8.9 FL (ref 9.2–11.8)
POTASSIUM SERPL-SCNC: 3.7 MMOL/L (ref 3.5–5.5)
PROT SERPL-MCNC: 4.8 G/DL (ref 6.4–8.2)
RBC # BLD AUTO: 2.74 M/UL (ref 4.35–5.65)
SERVICE CMNT-IMP: ABNORMAL
SODIUM SERPL-SCNC: 140 MMOL/L (ref 136–145)
TIBC SERPL-MCNC: 253 UG/DL (ref 250–450)
WBC # BLD AUTO: 7.7 K/UL (ref 4.6–13.2)

## 2022-04-08 PROCEDURE — 0DB98ZX EXCISION OF DUODENUM, VIA NATURAL OR ARTIFICIAL OPENING ENDOSCOPIC, DIAGNOSTIC: ICD-10-PCS | Performed by: INTERNAL MEDICINE

## 2022-04-08 PROCEDURE — 76040000007: Performed by: INTERNAL MEDICINE

## 2022-04-08 PROCEDURE — 74011250636 HC RX REV CODE- 250/636: Performed by: INTERNAL MEDICINE

## 2022-04-08 PROCEDURE — 77030040361 HC SLV COMPR DVT MDII -B: Performed by: INTERNAL MEDICINE

## 2022-04-08 PROCEDURE — 65270000029 HC RM PRIVATE

## 2022-04-08 PROCEDURE — 94760 N-INVAS EAR/PLS OXIMETRY 1: CPT

## 2022-04-08 PROCEDURE — 88305 TISSUE EXAM BY PATHOLOGIST: CPT

## 2022-04-08 PROCEDURE — 77030039961 HC KT CUST COLON BSC -D: Performed by: INTERNAL MEDICINE

## 2022-04-08 PROCEDURE — 2709999900 HC NON-CHARGEABLE SUPPLY: Performed by: INTERNAL MEDICINE

## 2022-04-08 PROCEDURE — 74011250637 HC RX REV CODE- 250/637: Performed by: HOSPITALIST

## 2022-04-08 PROCEDURE — 80053 COMPREHEN METABOLIC PANEL: CPT

## 2022-04-08 PROCEDURE — 77030021593 HC FCPS BIOP ENDOSC BSC -A: Performed by: INTERNAL MEDICINE

## 2022-04-08 PROCEDURE — 83540 ASSAY OF IRON: CPT

## 2022-04-08 PROCEDURE — 85025 COMPLETE CBC W/AUTO DIFF WBC: CPT

## 2022-04-08 PROCEDURE — 36415 COLL VENOUS BLD VENIPUNCTURE: CPT

## 2022-04-08 PROCEDURE — 94640 AIRWAY INHALATION TREATMENT: CPT

## 2022-04-08 PROCEDURE — 74011250637 HC RX REV CODE- 250/637: Performed by: INTERNAL MEDICINE

## 2022-04-08 PROCEDURE — 77010033678 HC OXYGEN DAILY

## 2022-04-08 PROCEDURE — 74011250636 HC RX REV CODE- 250/636: Performed by: HOSPITALIST

## 2022-04-08 PROCEDURE — G0500 MOD SEDAT ENDO SERVICE >5YRS: HCPCS | Performed by: INTERNAL MEDICINE

## 2022-04-08 PROCEDURE — 87040 BLOOD CULTURE FOR BACTERIA: CPT

## 2022-04-08 PROCEDURE — C9113 INJ PANTOPRAZOLE SODIUM, VIA: HCPCS | Performed by: HOSPITALIST

## 2022-04-08 PROCEDURE — 74011000250 HC RX REV CODE- 250: Performed by: HOSPITALIST

## 2022-04-08 PROCEDURE — 93306 TTE W/DOPPLER COMPLETE: CPT

## 2022-04-08 RX ORDER — FLUMAZENIL 0.1 MG/ML
0.2 INJECTION INTRAVENOUS
Status: CANCELLED | OUTPATIENT
Start: 2022-04-08 | End: 2022-04-08

## 2022-04-08 RX ORDER — EPINEPHRINE 0.1 MG/ML
1 INJECTION INTRACARDIAC; INTRAVENOUS
Status: CANCELLED | OUTPATIENT
Start: 2022-04-08 | End: 2022-04-09

## 2022-04-08 RX ORDER — ATROPINE SULFATE 0.1 MG/ML
0.5 INJECTION INTRAVENOUS
Status: CANCELLED | OUTPATIENT
Start: 2022-04-08 | End: 2022-04-09

## 2022-04-08 RX ORDER — MIDAZOLAM HYDROCHLORIDE 1 MG/ML
.25-5 INJECTION, SOLUTION INTRAMUSCULAR; INTRAVENOUS
Status: DISCONTINUED | OUTPATIENT
Start: 2022-04-08 | End: 2022-04-08 | Stop reason: HOSPADM

## 2022-04-08 RX ORDER — SODIUM CHLORIDE 0.9 % (FLUSH) 0.9 %
5-40 SYRINGE (ML) INJECTION AS NEEDED
Status: CANCELLED | OUTPATIENT
Start: 2022-04-08

## 2022-04-08 RX ORDER — SODIUM CHLORIDE 0.9 % (FLUSH) 0.9 %
5-40 SYRINGE (ML) INJECTION EVERY 8 HOURS
Status: CANCELLED | OUTPATIENT
Start: 2022-04-08

## 2022-04-08 RX ORDER — PANTOPRAZOLE SODIUM 40 MG/1
40 TABLET, DELAYED RELEASE ORAL
Status: DISCONTINUED | OUTPATIENT
Start: 2022-04-08 | End: 2022-04-15 | Stop reason: HOSPADM

## 2022-04-08 RX ORDER — CALCIUM GLUCONATE 20 MG/ML
1 INJECTION, SOLUTION INTRAVENOUS ONCE
Status: COMPLETED | OUTPATIENT
Start: 2022-04-08 | End: 2022-04-08

## 2022-04-08 RX ORDER — NALOXONE HYDROCHLORIDE 0.4 MG/ML
0.4 INJECTION, SOLUTION INTRAMUSCULAR; INTRAVENOUS; SUBCUTANEOUS
Status: CANCELLED | OUTPATIENT
Start: 2022-04-08 | End: 2022-04-08

## 2022-04-08 RX ORDER — SODIUM CHLORIDE 9 MG/ML
1000 INJECTION, SOLUTION INTRAVENOUS CONTINUOUS
Status: DISPENSED | OUTPATIENT
Start: 2022-04-08 | End: 2022-04-08

## 2022-04-08 RX ORDER — DIPHENHYDRAMINE HYDROCHLORIDE 50 MG/ML
50 INJECTION, SOLUTION INTRAMUSCULAR; INTRAVENOUS ONCE
Status: CANCELLED | OUTPATIENT
Start: 2022-04-08 | End: 2022-04-08

## 2022-04-08 RX ORDER — FENTANYL CITRATE 50 UG/ML
100 INJECTION, SOLUTION INTRAMUSCULAR; INTRAVENOUS
Status: DISCONTINUED | OUTPATIENT
Start: 2022-04-08 | End: 2022-04-08 | Stop reason: HOSPADM

## 2022-04-08 RX ORDER — DEXTROMETHORPHAN/PSEUDOEPHED 2.5-7.5/.8
1.2 DROPS ORAL
Status: CANCELLED | OUTPATIENT
Start: 2022-04-08

## 2022-04-08 RX ADMIN — ARFORMOTEROL TARTRATE 15 MCG: 15 SOLUTION RESPIRATORY (INHALATION) at 20:08

## 2022-04-08 RX ADMIN — BUDESONIDE 500 MCG: 0.5 INHALANT RESPIRATORY (INHALATION) at 08:07

## 2022-04-08 RX ADMIN — TAMSULOSIN HYDROCHLORIDE 0.4 MG: 0.4 CAPSULE ORAL at 18:00

## 2022-04-08 RX ADMIN — GABAPENTIN 100 MG: 100 CAPSULE ORAL at 08:35

## 2022-04-08 RX ADMIN — CHLORTHALIDONE 25 MG: 25 TABLET ORAL at 08:37

## 2022-04-08 RX ADMIN — ARFORMOTEROL TARTRATE 15 MCG: 15 SOLUTION RESPIRATORY (INHALATION) at 08:07

## 2022-04-08 RX ADMIN — CALCIUM GLUCONATE 1000 MG: 20 INJECTION, SOLUTION INTRAVENOUS at 12:32

## 2022-04-08 RX ADMIN — BUDESONIDE 500 MCG: 0.5 INHALANT RESPIRATORY (INHALATION) at 20:08

## 2022-04-08 RX ADMIN — PANTOPRAZOLE SODIUM 40 MG: 40 TABLET, DELAYED RELEASE ORAL at 18:07

## 2022-04-08 RX ADMIN — FUROSEMIDE 20 MG: 20 TABLET ORAL at 08:36

## 2022-04-08 RX ADMIN — GABAPENTIN 100 MG: 100 CAPSULE ORAL at 19:09

## 2022-04-08 RX ADMIN — GABAPENTIN 100 MG: 100 CAPSULE ORAL at 22:38

## 2022-04-08 RX ADMIN — VANCOMYCIN HYDROCHLORIDE 1250 MG: 10 INJECTION, POWDER, LYOPHILIZED, FOR SOLUTION INTRAVENOUS at 13:50

## 2022-04-08 RX ADMIN — SODIUM CHLORIDE 40 MG: 9 INJECTION INTRAMUSCULAR; INTRAVENOUS; SUBCUTANEOUS at 08:35

## 2022-04-08 NOTE — CONSULTS
[facility]  (A Division of [address] [place] [address])      Consultation Note  My partner Ed Prasad MD will  ID service Monday morning. Date of Admission: 4/7/2022      Date of Note: 4/8/2022      Reason for Referral: MRSA bacteremia      Current Antimicrobials:    Prior Antimicrobials:  1. Vanco IV (4/7-) #1        Assessment: Rec / Plan:   Occult MRSA bacteremia  Could all be contaminant, but I dunno -- 2 different sites both grew MRSA <18h. Just got to figure out where from and how much. TTE done earlier today; if that is (-), do a NATY Monday/next week. Sensitivity for TTE is only 25%. IF NATY also (-), I'd favor a 2-4wk course of IV vanco placing a PICC in him next week as his bloodstream sterilizes ->Vanco #1    I'll scut MICRO between naps over weekend and if they pop (+), I'll add more daily sets. IF they remain negative, no more BCx is fine. Dr Alina Mas will  on Monday and drive from there. Gastritis  Not my monkey, but I imagine MRSA could come from any disrupted endothelium. IT would be odd. COPD  I'm speculating the MRSA came from previous multiple admissions for his COPD attacks    CKD Stage II  Better this admission with IVF support    Hard of Hearing  Shout into his L ear. Microbiology:  4/8 - BCx sent  `   4/7 - BCx 2/2 (-)       4/6 - BCx 2/2 (+) MRSA with one set also growing CoNS      Lines / Catheters: peripheral      HPI:  CC:  \"My stomach has been hurting\"  Mr Regina Cornejo is a 63y WM with COPD/frequent admission here for that over last 3m who was called back yesterday after a visit to the ED 4/6 popped (+) MRSA (one set out of both arms); these grew within 18h apiece. He feels fine. No f/s/c. Tired/fatigued, but ascribes that to his lungs. Prior (+) MRSA infection wounds.     Retired          Active Hospital Problems    Diagnosis Date Noted    Bacteremia 04/07/2022    On supplemental oxygen by nasal cannula 04/02/2022    Gastritis and duodenitis 04/02/2022    Bladder wall thickening 04/02/2022    Acute deep vein thrombosis (DVT) of femoral vein of right lower extremity ([address] 02/03/2022    Acute exacerbation of chronic obstructive pulmonary disease (COPD) ([address] 10/05/2021    PAF (paroxysmal atrial fibrillation) [facility] 04/05/2021    Acute on chronic respiratory failure with hypoxemia (HCC) 08/19/2019    Age-related physical debility 07/08/2019    Chronic renal disease, stage 3, moderately decreased glomerular filtration rate between 30-59 mL/min/1.73 square meter ([facility] 07/20/2018    Chronic obstructive pulmonary disease ([address] 04/20/2018    Hypertension      Past Medical History:   Diagnosis Date    BMI 30.0-30.9,adult 4/2/2022    Brain aneurysm     Brain aneurysm     Cancer [facility])     prostate    CHF (congestive heart failure) [facility]     CKD (chronic kidney disease)     Closed nondisplaced supracondylar fracture of lower end of left femur without intracondylar extension with delayed healing     COPD (chronic obstructive pulmonary disease) ([address]     Debility     History of home oxygen therapy     [place] (hard of hearing)     Hypertension     Nocturia     On home oxygen therapy     PAF (paroxysmal atrial fibrillation) ([facility]     Pneumonia     Prostate CA ([facility]     Prostate neoplasm     Radiation effect      Past Surgical History:   Procedure Laterality Date    HX HERNIA REPAIR      HX HIP ARTHROSCOPY  04/09/2021     Family History   Problem Relation Age of Onset    Heart Disease Mother      Social History     Socioeconomic History    Marital status:      Spouse name: Not on file    Number of children: Not on file    Years of education: Not on file    Highest education level: Not on file   Occupational History    Not on file   Tobacco Use    Smoking status: Former Smoker     Types: Cigarettes    Smokeless tobacco: Never Used   Substance and Sexual Activity    Alcohol use: No    Drug use: Never    Sexual activity: Not on file   Other Topics Concern    Not on file   Social History Narrative    Not on file     Social Determinants of Health     Financial Resource Strain:     Difficulty of Paying Living Expenses: Not on file   Food Insecurity:     Worried About Running Out of Food in the Last Year: Not on file    Amaya of Food in the Last Year: Not on file   Transportation Needs:     Lack of Transportation (Medical): Not on file    Lack of Transportation (Non-Medical):  Not on file   Physical Activity:     Days of Exercise per Week: Not on file    Minutes of Exercise per Session: Not on file   Stress:     Feeling of Stress : Not on file   Social Connections:     Frequency of Communication with Friends and Family: Not on file    Frequency of Social Gatherings with Friends and Family: Not on file    Attends Mu-ism Services: Not on file    Active Member of [address] [place] [address] getupp or Organizations: Not on file    Attends Club or Organization Meetings: Not on file    Marital Status: Not on file   Intimate Partner Violence:     Fear of Current or Ex-Partner: Not on file    Emotionally Abused: Not on file    Physically Abused: Not on file    Sexually Abused: Not on file   Housing Stability:     Unable to Pay for Housing in the Last Year: Not on file    Number of [place] in the Last Year: Not on file    Unstable Housing in the Last Year: Not on file       Allergies:  Aspirin and Bactrim [sulfamethoxazole-trimethoprim]     Medications:  Current Facility-Administered Medications   Medication Dose Route Frequency    [START ON 4/9/2022] Vancomycin Random level to be drawn on 4/9 with am labs  1 Each Other ONCE    sodium chloride (NS) flush 5-10 mL  5-10 mL IntraVENous PRN    pantoprazole (PROTONIX) 40 mg in 0.9% sodium chloride 10 mL injection  40 mg IntraVENous DAILY    albuterol-ipratropium (DUO-NEB) 2.5 MG-0.5 MG/3 ML  3 mL Nebulization Q4H PRN    [Held by provider] apixaban (ELIQUIS) tablet 5 mg  5 mg Oral BID    furosemide (LASIX) tablet 20 mg  20 mg Oral DAILY    gabapentin (NEURONTIN) capsule 100 mg  100 mg Oral TID    tamsulosin (FLOMAX) capsule 0.4 mg  0.4 mg Oral QPM    traMADoL (ULTRAM) tablet 50 mg  50 mg Oral Q8H PRN    acetaminophen (TYLENOL) tablet 650 mg  650 mg Oral Q6H PRN    ondansetron (ZOFRAN) injection 4 mg  4 mg IntraVENous Q6H PRN    carboxymethylcellulose sodium (REFRESH PLUS) 0.5 % ophthalmic solution 2 Drop  2 Drop Both Eyes DAILY PRN    vancomycin (VANCOCIN) 1250 mg in  ml infusion  1,250 mg IntraVENous Q24H    Vancomycin Pharmacy To Dose  1 Each Other Rx Dosing/Monitoring    arformoteroL (BROVANA) neb solution 15 mcg  15 mcg Nebulization BID RT    budesonide (PULMICORT) 500 mcg/2 ml nebulizer suspension  500 mcg Nebulization BID RT    amLODIPine (NORVASC) tablet 10 mg  10 mg Oral DAILY    [Held by provider] chlorthalidone (HYGROTON) tablet 25 mg  25 mg Oral DAILY        ROS:  Constitutional: negative for fevers, chills and sweats  Respiratory: negative for cough  Cardiovascular: negative for chest pain, dyspnea  Gastrointestinal: negative for nausea, vomiting and diarrhea  Genitourinary:negative for dysuria     Physical Exam:    Temp (24hrs), Av °F (36.7 °C), Min:97.6 °F (36.4 °C), Max:98.7 °F (37.1 °C)    Visit Vitals  BP (!) 121/47   Pulse 65   Temp 98 °F (36.7 °C)   Resp 15   Ht 5' 8\" (1.727 m)   Wt 74.8 kg (165 lb)   SpO2 99%   BMI 25.09 kg/m²       General: Well developed, well nourished 64 y.o.  WM in no acute distress.   ENT: ENT exam normal, no neck nodes or sinus tenderness (+) [place]  Head: normocephalic, without obvious abnormality  Mouth:  mucous membranes moist, pharynx normal without lesions  Neck: supple, symmetrical, trachea midline   Cardio:  regular rate and rhythm, S1, S2 normal, no murmur, click, rub or gallop  Chest: inspection normal - no chest wall deformities or tenderness, respiratory effort normal  Lungs: clear to auscultation, no wheezes or rales and unlabored breathing  Abdomen: soft, non-tender. Bowel sounds normal. No masses, no organomegaly.   Extremities:  no redness or tenderness in the calves or thighs, no edema  Neuro: Grossly normal       Lab results:    Chemistry  Recent Labs     04/08/22 0214 04/07/22 1022 04/05/22 2239   GLU 98 89 109*    141 139   K 3.7 3.5 4.0    104 104   CO2 33* 32 31   BUN 28* 26* 39*   CREA 1.10 1.11 1.28   CA 6.4* 7.1* 6.8*   AGAP 2* 5 4   BUCR 25* 23* 30*   AP 63 90 89   TP 4.8* 6.0* 5.7*   ALB 2.1* 2.9* 2.6*   GLOB 2.7 3.1 3.1   AGRAT 0.8 0.9 0.8       CBC w/ Diff  Recent Labs     04/08/22 0214 04/07/22 1022 04/05/22 2239   WBC 7.7 10.0 10.0   RBC 2.74* 3.73* 3.26*   HGB 7.5* 10.1* 8.8*   HCT 24.0* 33.1* 28.3*    424* 416   GRANS 69 73 87*   LYMPH 19* 20* 8*   EOS 3 1 0       Microbiology  All Micro Results     Procedure Component Value Units Date/Time    CULTURE, BLOOD [[id number]] Collected: 04/07/22 1022    Order Status: Completed Specimen: Blood Updated: 04/08/22 0950     Special Requests: NO SPECIAL REQUESTS        Culture result: NO GROWTH AFTER 22 HOURS       CULTURE, BLOOD [[id number]] Collected: 04/08/22 0214    Order Status: Completed Specimen: Blood Updated: 04/08/22 0950     Special Requests: NO SPECIAL REQUESTS        Culture result: NO GROWTH AFTER 7 HOURS       CULTURE, BLOOD [[id number]] Collected: 04/07/22 1022    Order Status: Completed Specimen: Blood Updated: 04/08/22 0950     Special Requests: NO SPECIAL REQUESTS        Culture result: NO GROWTH AFTER 22 HOURS       ENTERIC BACTERIA PANEL, DNA [[id number]]     Order Status: Sent Specimen: Stool            Alisa Vasquez MD  Cell [phone]  [facility]  4/8/2022   4:06 PM

## 2022-04-08 NOTE — PROGRESS NOTES
Problem: Falls - Risk of  Goal: *Absence of Falls  Description: Document Kaitlyn Lopez Fall Risk and appropriate interventions in the flowsheet.   Outcome: Progressing Towards Goal  Note: Fall Risk Interventions:                 Elimination Interventions: Call light in reach,Bed/chair exit alarm              Problem: Patient Education: Go to Patient Education Activity  Goal: Patient/Family Education  Outcome: Progressing Towards Goal

## 2022-04-08 NOTE — PROGRESS NOTES
Reason for Admission:    Bacteremia. RUR Score:  High; 32%         PCP: First and Last name:  Cinthya Bowers MD     Name of Practice:   Are you a current patient: Yes/No:   Approximate date of last visit:    Can you do a virtual visit with your PCP:              Resources/supports as identified by patient/family:                   Top Challenges facing patient (as identified by patient/family and CM): Finances/Medication cost?                    Transportation? Support system or lack thereof? Living arrangements? Self-care/ADLs/Cognition? Current Advanced Directive/Advance Care Plan:  DNR      Healthcare Decision Maker:   Click here to complete HealthCare Decision Makers including selection of the Healthcare Decision Maker Relationship (ie \"Primary\")      Primary Decision MakerCcoltlee Phelan - [phone]    Secondary Decision Maker: Adelso Marin - [phone]    Payor Source Payor: Tameka Combe / Plan: [place] MEDICARE PART A / Product Type: Medicare /                             Plan for utilizing home health:   TBD                  Transition of Care Plan:     [facility] and physician follow up vs Home with physician follow up              Chart reviewed. Per H&P \"This 73-year-old gentleman recently discharged from the hospital on 04/03. He was admitted for a COPD exacerbation. During that time, there were no gastrointestinal issues. He responded quickly to steroids, nebulizer treatments, and was able to discharge on the 3rd after being admitted on the 1st.  This patient has chronic AFib, on Eliquis. He has COPD, on home oxygen. He has partial deafness and history of brain aneurysm. With that, the patient came back to the hospital on 04/05 with abdominal pain. He had a CT scan that showed duodenitis, possible ulcer, but no perforation.   He was recommended to go on medication therapy and follow up with GI as an outpatient as he got better after treatment in the emergency room, and with that he was called back today because blood cultures that had been done from this recent visit came back with MRSA. The patient is actually feeling okay today. He has had no nausea or vomiting, no blood in the stool. Intermittent abdominal discomfort, but nothing serious in the last 24 hours. He is taking his medications as prescribed. He has had no myalgias or chills. PCP is Dr. Jud Chao. \"    Noted pt was ambulating independently in the room. CM attempted to contact pt's wife, Vickie Hawthorne ([phone]), IT discuss transition of care. CM left a message requesting a return call. Anticipate pt will transition home with physician follow up when medically stable. CM to continue to follow and assist.    Care Management Interventions  Mode of Transport at Discharge:  Other (see comment) (Family)  Transition of Care Consult (CM Consult): Discharge Planning  Health Maintenance Reviewed: Yes  Support Systems: Spouse/Significant Other,Child(prashant)  The Plan for Transition of Care is Related to the Following Treatment Goals : [facility] and physician follow up vs Home with physician follow up  Discharge Location  Patient Expects to be Discharged to[de-identified] Home with family assistance     Readmission Assessment  Number of days since last admission?: 1-7 days  Previous disposition: Home with Family  What was the patient's/caregiver's perception as to why they think they needed to return back to the hospital?: Other (Comment) (called to return due to blood cultures)

## 2022-04-08 NOTE — PROGRESS NOTES
[address] Pharmacokinetic Monitoring Service - Vancomycin     Jacque Colon is a 64 y.o. male on vancomycin therapy for bloodstream infection . Pharmacy consulted by Dr. Marilu Dunlap for monitoring and adjustment. Target Concentration: Goal AUC/MARTÍN 400-600 mg*hr/L    Additional Antimicrobials: none    Pertinent Laboratory Values: Wt Readings from Last 1 Encounters:   04/08/22 76.4 kg (168 lb 8 oz)     Temp Readings from Last 1 Encounters:   04/08/22 98 °F (36.7 °C)     No components found for: PROCAL  Estimated Creatinine Clearance: 53.5 mL/min (based on SCr of 1.1 mg/dL). Recent Labs     04/08/22  0214 04/07/22  1022   WBC 7.7 10.0       Pertinent Cultures:  Culture Date Source Results   4/6/22 blood MRSA     Plan:  Dosing recommendations based on Bayesian software  Vancomycin 1750 mg IVPB x 1 dose given 4/7/22 @ 1255.  Maintenance dose: Vancomycin 1250 mg IV q24h  Anticipated AUC of 486 mg/l.hr and trough concentration of 14.4 mg/l at steady state  Renal labs as indicated   Vancomycin concentration ordered for 4/9/22 with am labs    Pharmacy will continue to monitor patient and adjust therapy as indicated    Estephania Johnston [facility], BCPS  4/8/2022 11:30 AM

## 2022-04-08 NOTE — PROGRESS NOTES
Comprehensive Nutrition Assessment    Type and Reason for Visit: Reassess    Nutrition Recommendations/Plan: NPO at the moment. Continue with oral diet after EGD- noted regular diet order 4/8 at 1500. Continue with ensure enlive TID (breakfast, lunch, HS snack). Nutrition Assessment:  PMHx: COPD, Brain aneurysm, CKD, oxygen dependence, HTN,  Paroxysmal AFib, Prostate cancer, CHF. Admitted for MRSA bacteremia. Noted plan for EGD today- NPO status. 04/08/22 1254   Malnutrition Assessment   Context of Malnutrition Chronic illness   Chronic Illness - Energy Intake No significant decrease in energy intake   Chronic Illness - Weight Loss 7.00 - Greater than 10% over 6 months   Chronic Illness - Body Fat Loss 1 - Mild body fat loss   Chronic Illness - Body Fat Loss Locations Triceps;Orbital;Buccal region   Chronic Illness - Muscle Mass Loss 1 - Mild muscle mass loss   Chronic Illness - Muscle Mass Location Temples (temporalis); Clavicles (pectoralis &deltoids); Hand (interosseous)   Chronic Illness - Fluid Accumulation Unable to assess   Chronic Illness -  Strength Not performed   Chronic Illness - Malnutrition Score  9   Malnutrition Status Moderate malnutrition     Estimated Daily Nutrient Needs:  Energy (kcal): 3137-4502; Weight Used for Energy Requirements: Current  Protein (g): 75-82; Weight Used for Protein Requirements: Current (1-1.1g)  Fluid (ml/day): 2951-3057; Method Used for Fluid Requirements: 1 ml/kcal    Nutrition Related Findings:  Labs and meds reviewed- flomax, protonix, lasix. BM 4/7. NPO since 4/8 @ 1115. Noted regular diet to start 4/8 @ 1500. Pt also has Ensure enlive TID (breakfast, lunch, HS snack). Pt was hard of hearing. Was able to write some questions and pt was able to respond. Pt reports appetite is fine and has been fine prior to admission. However, pt was upset because pt was hungry at the time of visit due to NPO status. Ask about weight and states no weight lost or gain. Stated normally weights around 200lb. Weight hx reviewed and noted significant weight lost:   217lb 4/2021 (24% x1 year)  200lb 5/2021  (17.5% x11m)  192lb 8/2021 ( 14.1% x8m)  185lb 10/2021 (10.9% x6m)    Wounds:    None       Current Nutrition Therapies:  ADULT ORAL NUTRITION SUPPLEMENT Breakfast, Lunch, HS Snack; Standard High Calorie/High Protein  DIET NPO  ADULT DIET Regular    Anthropometric Measures:  · Height:  5' 8\" (172.7 cm)  · Current Body Wt:  74.8 kg (164 lb 14.5 oz)   · Admission Body Wt:  164 lb 14.5 oz    · Usual Body Wt:  83.9 kg (185 lb) (10/2021; -10.9% x6m)     · Ideal Body Wt:  154 lbs:  107.1 %   · BMI Category: Overweight (BMI 25.0-29. 9)       Nutrition Diagnosis:   · Inadequate oral intake related to acute injury/trauma as evidenced by NPO or clear liquid status due to medical condition     · Moderate malnutrition related to inadequate protein-energy intake as evidenced by weight lost greater than or equal to 10% in 6 months, mild muscle loss, mild loss of subcutaneous fat    Nutrition Interventions:   Food and/or Nutrient Delivery: Start oral diet,Start oral nutrition supplement  Nutrition Education and Counseling: No recommendations at this time  Coordination of Nutrition Care: Continue to monitor while inpatient    Goals:  Advance diet and PO intake >75% of needs throughout the next 3-4 days     Nutrition Monitoring and Evaluation:   Behavioral-Environmental Outcomes: None identified  Food/Nutrient Intake Outcomes: Food and nutrient intake,Diet advancement/tolerance,Supplement intake  Physical Signs/Symptoms Outcomes: Biochemical data,Nutrition focused physical findings,Skin,Weight,Meal time behavior    Discharge Planning:    Continue current diet,Continue oral nutrition supplement     Electronically signed by Yehuda Calvert RD on 4/8/2022 at 12:57 PM

## 2022-04-08 NOTE — PERIOP NOTES
Report called to [facility], RN, Patient administered Versed 5 mg IV, Fentanyl 100 mcg IV in Right PIV. Patient had a total of 100 ml NS. Patient opens eyes to name and answers questions appropriately, Sp02 98% on 2L via nasal cannula, VSS This writer and ENDO staff to transport patient via bed to room 319.

## 2022-04-08 NOTE — PROGRESS NOTES
Patient arrived to ENDO room 2, no IV access. This RN attempted to start an IV x 2, charge nurse notified, Aleksander Billingsley, RN attempted x 2.  Supervisor notified, IV team to be notified

## 2022-04-08 NOTE — PROGRESS NOTES
Physician Progress Note      PATIENTTwanda Core  [facility] #:                  [id number]  :                       1945  ADMIT DATE:       2022 10:12 AM  DISCH DATE:  RESPONDING  PROVIDER #:        Cherelle Rodarte MD          QUERY TEXT:    Patient admitted with bactermia, noted to have chronic atrial fibrillation and is maintained on Eliquis. If possible, please document in progress notes and discharge summary if you are evaluating and/or treating any of the following: The medical record reflects the following:    Risk Factors: Chronic atrial fibrillation, long term use of anticoagulants, 64year old, HTN, HX of Acute deep vein thrombosis (DVT) of femoral vein of right lower extremity    Clinical Indicators:  > Chronic atrial fibrillation and is maintained on Eliquis    Treatment: Receiving Eliquis    Thank you,  Neri Fulton RN, BSN, CRCR  [email or web address]  Options provided:  -- Secondary hypercoagulable state in a patient with atrial fibrillation  -- Other - I will add my own diagnosis  -- Disagree - Not applicable / Not valid  -- Disagree - Clinically unable to determine / Unknown  -- Refer to Clinical Documentation Reviewer    PROVIDER RESPONSE TEXT:    Provider is clinically unable to determine a response to this query.     Query created by: Kiara Rooney on 2022 3:32 PM      Electronically signed by:  Cherelle Rodarte MD 2022 7:12 AM

## 2022-04-08 NOTE — PROGRESS NOTES
Spoke to RN informed pt to have EGD this afternoon, pt currently not NPO.  Patient to stop all liquids and foods, Dr. Destin Rees notified of NPO status

## 2022-04-08 NOTE — PROGRESS NOTES
Physician Progress Note      [address]  [facility] #:                  [id number]  :                       1945  ADMIT DATE:       2022 10:12 AM  DISCH DATE:  RESPONDING  PROVIDER #:        Tyler Lewis MD          QUERY TEXT:    Patient admitted with fluid overload and CHF. Noted documentation of Acute exacerbation of CHF in the patient active problem list, Per H&P severe systolic CHF, CHF exacerbation, Per H&P Assessment- Chronic diastolic heart failure. If possible, please clarify and document in progress notes and discharge summary if you are evaluating and /or treating any of the following: The medical record reflects the following:    Risk Factors: ESRD, CHF    Clinical Indicators:  > Echocardiogram 3/31/22- Severely reduced left ventricular systolic function with a visually estimated EF of 20 - 25%. Grade II diastolic dysfunction with increased LAP.  > Pt came in with acute volume overload and respiratory insufficiency, requiring intubation    Treatment: receiving Cardiology, Nephrology, Dialysis, echocardiogram    Thank you,  Fernando Ayoub RN, BSN, CRCR  [email or web address]  Options provided:  -- Acute on Chronic Systolic CHF/HFrEF  -- Acute on Chronic Diastolic CHF/HFpEF  -- Acute on Chronic Systolic and Diastolic CHF  -- Chronic Diastolic CHF/HFpEF  -- Other - I will add my own diagnosis  -- Disagree - Not applicable / Not valid  -- Disagree - Clinically unable to determine / Unknown  -- Refer to Clinical Documentation Reviewer    PROVIDER RESPONSE TEXT:    This patient has chronic diastolic CHF/HFpEF.     Query created by: Byron De Los Santos on 2022 8:46 AM      Electronically signed by:  Tyler Lewis MD 2022 12:38 PM

## 2022-04-08 NOTE — PROGRESS NOTES
4046  Assesment complete. Pt alert and oriented x4. Lungs clear on 2 Liters Nasal Cannula. Stated pain 0/10. Incentive spirometer at bedside. Call bell within reach. Bed in lowest position. 36  Notified MD of EGD scheduled this afternoon. Verbal order for NPO effective immediately. 1630  Pt off the floor to [facility]     1818  Verbal report given to Scotty Escalona RN by Miguel Zazueta RN on patien being transferred from [facility] to [address] [place] [address] Drive for questions were provided. Verbal and Bedside change of shift report given to Tracie Fuentes RN by Scotty Escalona RN. Opportunity for questions were provided.

## 2022-04-08 NOTE — PROCEDURES
(EGD) Esophagogastroduodenoscopy (UPPER ENDOSCOPY) Procedure Note  [facility] [place]  __________________________________________________________________________________________________________________________      4/8/2022     Patient: Dionne Allen YOB: 1945 Gender: male Age: 64 y.o. INDICATION:  63 yo male on home 02 recently in for COPD exacerbation, came in to ER for abd pain again 2 days ago, then had blood cx which returned positive for MRSA bacteremia of unknown source. He did have last year in 8 2021 MRSA positive infected wound. abd pain/duodenitis and worsening anemia noted  CT scan abd on 4/6/ 2022: There is persistent inflammatory stranding associated with the descending duodenum concerning for duodenitis, possible underlying ulcer without perforation. Calcification or high density ingested body is again noted along the medial aspect of the duodenum in this region, possibly within a duodenal diverticulum. Additional partially gas-filled duodenal diverticulum noted. H Pylori serology Ig A and Ig G were negative  3.  Chronic obstructive pulmonary disease, chronic oxygen dependence. No ac exacerbation  4.  Chronic diastolic heart failure. Compensated, daily lasix  5.  Chronic atrial fibrillation. on eliquis was on hold since yesterday   6.  Chronic kidney disease, stage III. 7.Anemia, severe 7.5, check occult stool, pending. Denied having GI symptoms. Had a negative colonoscopy 5 years ago. Was told to have ulcer few years ago. : Clay Wen MD    Referring Provider:  Cinthya Bowers MD    Sedation:  Versed 5 mg IV, Fentanyl 100 mcg IV    Procedure Details:  After infomed consent was obtained for the procedure, with all risks and benefits of procedure explained to the patient. He was taken to the endoscopy suite and placed in the left lateral decubitus position.   Following sequential administration of sedation as per above, the endoscope was inserted into the mouth and advanced under direct vision to third portion of the duodenum. A careful inspection was made as the gastroscope was withdrawn, including a retroflexed view of the proximal stomach; findings and interventions are described below. OROPHARYNX: The vocal Cords and the larynx are normal.   ESOPHAGUS: The proximal, mid, and distal oesophagus are normal. The Z-Line is intact. No Hiatal hernia. Diaphragmatic opening or notch is located at 46 cm. STOMACH: Presence of small amount solid food retention but no evidence of blood, fluid. The fundus on antegrade and retroflex views is normal. The cardia, body, lesser curvature, greater curvature, the antrum, and pylorus are normal. The gastric mucosa is normal.  DUODENUM: In the distal bulb there are 2 medium and large ulcers. The larger one is deeper well respected  respected margins measuring> 1.5 cm with 2 red flat red spots in the center. Further down into the second and the third portions there is diffuse mucosal congestion with more superficial serpiginous ulcerations few superficial biopsies taken. The major papilla is normal. No diverticulum or mass are identified. PROXIMAL JEJUNUM:  Not examined. Therapies:  Tiny Duodenal biopsies x 7    Specimen: one           Complications:   None    EBL:  Negligible. IMPLANTS: * No implants in log *  IMPRESSION: Presence of small amount solid food retention but no evidence of blood, fluid. In the distal bulb there are 2 medium and large ulcers. The larger one is deeper with well respected margins measuring > 1.5 cm with 2 red flat red spots in the center. Further down into the second and the third portions there is diffuse mucosal congestion with more superficial serpiginous ulcerations few superficial biopsies taken. The cause of these ulcer is not clear. It could be due to ischemia? ? But no abdominal pain.  May due duplex of the abdominal arterial vessels and Need to exclude Gastrinoma by checking the Gastrin level.  RECOMMENDATION:  May resume regular diet. Avoid NSAID's. Make a FU appointment at the office. Start taking Protonix 40 mg twice daily. Avoid all NSAID's and ASA. Can resume the Eliquis in 2 days.     Assistant: None    --Theodore Quintanilla MD on 4/8/2022 at 4:39 PM

## 2022-04-08 NOTE — PROGRESS NOTES
Hospitalist Progress Note    Patient: Delphine Atwood MRN: [id number]  CSN: [id number]    YOB: 1945  Age: 64 y.o. Sex: male    DOA: 4/7/2022 LOS:  LOS: 1 day          Chief Complaint:    bacteremia      Assessment/Plan     63 yo male on home 02 recently in for COPD exacerbation, came in to ER for abd pain again 2 days ago, then had blood cx which returned positive and he was called in for admission    Very [place], does not have hearing aides here, he can understand slow loud speech    Admitted for MRSA bacteremia, source uncertain yet-appreciate ID help    For echo today  For EGD today also as abd pain/duodenitis and worsening anemia noted    1. Methicillin-resistant Staphylococcus aureus bacteremia. Vanco, repeats pending, echo today, NATY possibly  2. Duodenitis with possible foreign body in a diverticulum. EGD  3. Chronic obstructive pulmonary disease, chronic oxygen dependence. No ac exacerbation  4. Chronic diastolic heart failure. Compensated, daily lasix  5. Chronic atrial fibrillation. Stable, hold eliquis fir EGD  6. Chronic kidney disease, stage III.   7.Anemia, severe, check occult stool, and send iron profile    Continue IV antibiotics and further evaluation    Appreciate GI and ID consults      Disposition :  Patient Active Problem List   Diagnosis Code    Hypertension I10    Chronic obstructive pulmonary disease ([facility]) J44.9    Chronic renal disease, stage 3, moderately decreased glomerular filtration rate between 30-59 mL/min/1.73 square meter ([facility]) N18.30    Age-related physical debility R54    Acute on chronic respiratory failure with hypoxemia ([facility]) J96.21    PAF (paroxysmal atrial fibrillation) ([facility]) I48.0    Avascular necrosis of bone of left hip ([facility]) M87.052    Anemia D64.9    COPD with acute exacerbation ([facility]) J44.1    Acute exacerbation of chronic obstructive pulmonary disease (COPD) ([facility]) J44.1    Loculated pleural effusion J90    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    Right lower lobe lung mass R91.8    Bilateral deafness H91.93    Acute deep vein thrombosis (DVT) of femoral vein of right lower extremity (HCC) I82.411    Gastritis and duodenitis K29.90    BMI 30.0-30.9,adult Z68.30    On supplemental oxygen by nasal cannula Z78.9    Bladder wall thickening N32.89    Bacteremia R78.81       Subjective:  \"im fine\"  he has no complaints  Breathing is baseline  no cough  No chest pain  No myalgias      Review of systems:    Constitutional: denies fevers, chills  Respiratory: denies SOB,   Cardiovascular: denies chest pain  Gastrointestinal: denies nausea, vomiting, diarrhea      Vital signs/Intake and Output:  Visit Vitals  BP (!) 122/52 (BP 1 Location: Left upper arm, BP Patient Position: Sitting)   Pulse 79   Temp 98.1 °F (36.7 °C)   Resp 14   Wt 76.2 kg (168 lb)   SpO2 95%   BMI 25.54 kg/m²     Current Shift:  No intake/output data recorded.   Last three shifts:  04/06 1901 - 04/08 0700  In: -   Out: 300 [Urine:300]    Exam:    General: elderly Alutiiq Wm, alert, NAD, OX3  CVS:Regular rate and rhythm, no M/R/G, S1/S2 heard, no thrill  Lungs:Clear to auscultation bilaterally, no wheezes, rhonchi, or rales  Abdomen: Soft, Nontender, No distention, Normal Bowel sounds  Extremities: No C/C/E, pulses palpable 2+  Neuro:grossly normal , follows commands  Psych:appropriate                Labs: Results:       Chemistry Recent Labs     04/08/22  0214 04/07/22  1022 04/05/22  2239   GLU 98 89 109*    141 139   K 3.7 3.5 4.0    104 104   CO2 33* 32 31   BUN 28* 26* 39*   CREA 1.10 1.11 1.28   CA 6.4* 7.1* 6.8*   AGAP 2* 5 4   BUCR 25* 23* 30*   AP 63 90 89   TP 4.8* 6.0* 5.7*   ALB 2.1* 2.9* 2.6*   GLOB 2.7 3.1 3.1   AGRAT 0.8 0.9 0.8      CBC w/Diff Recent Labs     04/08/22  0214 04/07/22  1022 04/05/22  2239   WBC 7.7 10.0 10.0   RBC 2.74* 3.73* 3.26*   HGB 7.5* 10.1* 8.8*   HCT 24.0* 33.1* 28.3*    424* 416   GRANS 69 73 87*   LYMPH 19* 20* 8*   EOS 3 1 0      Cardiac Enzymes No results for input(s): CPK, CKND1, WILLARD in the last 72 hours. No lab exists for component: CKRMB, TROIP   Coagulation No results for input(s): PTP, INR, APTT, INREXT in the last 72 hours. Lipid Panel Lab Results   Component Value Date/Time    Cholesterol, total 172 04/02/2022 03:23 AM    HDL Cholesterol 39 (L) 04/02/2022 03:23 AM    LDL, calculated 109.2 (H) 04/02/2022 03:23 AM    VLDL, calculated 23.8 04/02/2022 03:23 AM    Triglyceride 119 04/02/2022 03:23 AM    CHOL/HDL Ratio 4.4 04/02/2022 03:23 AM      BNP No results for input(s): BNPP in the last 72 hours.    Liver Enzymes Recent Labs     04/08/22  0214   TP 4.8*   ALB 2.1*   AP 63      Thyroid Studies Lab Results   Component Value Date/Time    TSH 3.06 04/05/2021 01:30 PM        Procedures/imaging: see electronic medical records for all procedures/Xrays and details which were not copied into this note but were reviewed prior to creation of Chrissie Barney MD

## 2022-04-09 LAB
ALBUMIN SERPL-MCNC: 2.1 G/DL (ref 3.4–5)
ALBUMIN/GLOB SERPL: 0.7 {RATIO} (ref 0.8–1.7)
ALP SERPL-CCNC: 70 U/L (ref 45–117)
ALT SERPL-CCNC: 13 U/L (ref 16–61)
ANION GAP SERPL CALC-SCNC: 2 MMOL/L (ref 3–18)
AST SERPL-CCNC: 8 U/L (ref 10–38)
BASOPHILS # BLD: 0 K/UL (ref 0–0.1)
BASOPHILS NFR BLD: 1 % (ref 0–2)
BILIRUB SERPL-MCNC: 0.3 MG/DL (ref 0.2–1)
BUN SERPL-MCNC: 27 MG/DL (ref 7–18)
BUN/CREAT SERPL: 21 (ref 12–20)
CALCIUM SERPL-MCNC: 6.9 MG/DL (ref 8.5–10.1)
CHLORIDE SERPL-SCNC: 104 MMOL/L (ref 100–111)
CO2 SERPL-SCNC: 33 MMOL/L (ref 21–32)
CREAT SERPL-MCNC: 1.3 MG/DL (ref 0.6–1.3)
DIFFERENTIAL METHOD BLD: ABNORMAL
EOSINOPHIL # BLD: 0.5 K/UL (ref 0–0.4)
EOSINOPHIL NFR BLD: 6 % (ref 0–5)
ERYTHROCYTE [DISTWIDTH] IN BLOOD BY AUTOMATED COUNT: 15.2 % (ref 11.6–14.5)
GLOBULIN SER CALC-MCNC: 3 G/DL (ref 2–4)
GLUCOSE SERPL-MCNC: 99 MG/DL (ref 74–99)
HCT VFR BLD AUTO: 25.9 % (ref 36–48)
HGB BLD-MCNC: 8.1 G/DL (ref 13–16)
IMM GRANULOCYTES # BLD AUTO: 0.1 K/UL (ref 0–0.04)
IMM GRANULOCYTES NFR BLD AUTO: 1 % (ref 0–0.5)
LYMPHOCYTES # BLD: 1 K/UL (ref 0.9–3.6)
LYMPHOCYTES NFR BLD: 13 % (ref 21–52)
MCH RBC QN AUTO: 27.7 PG (ref 24–34)
MCHC RBC AUTO-ENTMCNC: 31.3 G/DL (ref 31–37)
MCV RBC AUTO: 88.7 FL (ref 78–100)
MONOCYTES # BLD: 0.7 K/UL (ref 0.05–1.2)
MONOCYTES NFR BLD: 9 % (ref 3–10)
NEUTS SEG # BLD: 5.8 K/UL (ref 1.8–8)
NEUTS SEG NFR BLD: 71 % (ref 40–73)
NRBC # BLD: 0 K/UL (ref 0–0.01)
NRBC BLD-RTO: 0 PER 100 WBC
PLATELET # BLD AUTO: 309 K/UL (ref 135–420)
PMV BLD AUTO: 9 FL (ref 9.2–11.8)
POTASSIUM SERPL-SCNC: 3.9 MMOL/L (ref 3.5–5.5)
PROT SERPL-MCNC: 5.1 G/DL (ref 6.4–8.2)
RBC # BLD AUTO: 2.92 M/UL (ref 4.35–5.65)
SODIUM SERPL-SCNC: 139 MMOL/L (ref 136–145)
VANCOMYCIN SERPL-MCNC: 12.2 UG/ML (ref 5–40)
WBC # BLD AUTO: 8.2 K/UL (ref 4.6–13.2)

## 2022-04-09 PROCEDURE — 36415 COLL VENOUS BLD VENIPUNCTURE: CPT

## 2022-04-09 PROCEDURE — 74011250637 HC RX REV CODE- 250/637: Performed by: HOSPITALIST

## 2022-04-09 PROCEDURE — 74011250637 HC RX REV CODE- 250/637: Performed by: INTERNAL MEDICINE

## 2022-04-09 PROCEDURE — 83516 IMMUNOASSAY NONANTIBODY: CPT

## 2022-04-09 PROCEDURE — 85025 COMPLETE CBC W/AUTO DIFF WBC: CPT

## 2022-04-09 PROCEDURE — 80053 COMPREHEN METABOLIC PANEL: CPT

## 2022-04-09 PROCEDURE — 65270000029 HC RM PRIVATE

## 2022-04-09 PROCEDURE — 74011000250 HC RX REV CODE- 250: Performed by: EMERGENCY MEDICINE

## 2022-04-09 PROCEDURE — 82941 ASSAY OF GASTRIN: CPT

## 2022-04-09 PROCEDURE — 80202 ASSAY OF VANCOMYCIN: CPT

## 2022-04-09 PROCEDURE — 74011250636 HC RX REV CODE- 250/636: Performed by: HOSPITALIST

## 2022-04-09 PROCEDURE — 94640 AIRWAY INHALATION TREATMENT: CPT

## 2022-04-09 PROCEDURE — 74011000250 HC RX REV CODE- 250: Performed by: HOSPITALIST

## 2022-04-09 PROCEDURE — 77010033678 HC OXYGEN DAILY

## 2022-04-09 PROCEDURE — 94760 N-INVAS EAR/PLS OXIMETRY 1: CPT

## 2022-04-09 RX ADMIN — ARFORMOTEROL TARTRATE 15 MCG: 15 SOLUTION RESPIRATORY (INHALATION) at 08:22

## 2022-04-09 RX ADMIN — VANCOMYCIN HYDROCHLORIDE 1250 MG: 10 INJECTION, POWDER, LYOPHILIZED, FOR SOLUTION INTRAVENOUS at 14:32

## 2022-04-09 RX ADMIN — BUDESONIDE 500 MCG: 0.5 INHALANT RESPIRATORY (INHALATION) at 08:22

## 2022-04-09 RX ADMIN — GABAPENTIN 100 MG: 100 CAPSULE ORAL at 16:16

## 2022-04-09 RX ADMIN — GABAPENTIN 100 MG: 100 CAPSULE ORAL at 08:14

## 2022-04-09 RX ADMIN — FUROSEMIDE 20 MG: 20 TABLET ORAL at 08:13

## 2022-04-09 RX ADMIN — GABAPENTIN 100 MG: 100 CAPSULE ORAL at 21:53

## 2022-04-09 RX ADMIN — AMLODIPINE BESYLATE 10 MG: 5 TABLET ORAL at 08:14

## 2022-04-09 RX ADMIN — ARFORMOTEROL TARTRATE 15 MCG: 15 SOLUTION RESPIRATORY (INHALATION) at 20:46

## 2022-04-09 RX ADMIN — PANTOPRAZOLE SODIUM 40 MG: 40 TABLET, DELAYED RELEASE ORAL at 08:14

## 2022-04-09 RX ADMIN — TAMSULOSIN HYDROCHLORIDE 0.4 MG: 0.4 CAPSULE ORAL at 17:13

## 2022-04-09 RX ADMIN — SODIUM CHLORIDE, PRESERVATIVE FREE 10 ML: 5 INJECTION INTRAVENOUS at 21:54

## 2022-04-09 RX ADMIN — PANTOPRAZOLE SODIUM 40 MG: 40 TABLET, DELAYED RELEASE ORAL at 16:16

## 2022-04-09 RX ADMIN — BUDESONIDE 500 MCG: 0.5 INHALANT RESPIRATORY (INHALATION) at 20:46

## 2022-04-09 NOTE — PROGRESS NOTES
Problem: Falls - Risk of  Goal: *Absence of Falls  Description: Document Nickolas Pool Fall Risk and appropriate interventions in the flowsheet.   Outcome: Progressing Towards Goal  Note: Fall Risk Interventions:            Medication Interventions: Patient to call before getting OOB    Elimination Interventions: Call light in reach              Problem: Patient Education: Go to Patient Education Activity  Goal: Patient/Family Education  Outcome: Progressing Towards Goal

## 2022-04-09 NOTE — CONSULTS
[id number] [facility]    Name:  Arnold Meyer  MR#:   [id number]  :  1945  ACCOUNT #:  [de-identified]  DATE OF SERVICE:  2022    HISTORY OF PRESENT ILLNESS:  This is a 77-year-old male who was admitted on 2022 because of MRSA positive blood test.  The patient was already discharged on  for COPD exacerbation. The patient was started on vancomycin. The source of that bacteremia was unknown. CT scan of the abdomen and pelvis done on  revealed persistent inflammatory stranding associated with the descending duodenum concerning for duodenitis and possibly underlying ulcer. There was a calcification of high density ingested body along the medial aspect of the duodenum in this region suggesting possibly a duodenal diverticulum. Also, there is partially filled duodenal diverticulum. H. pylori serology for IgA and IgG were negative. The patient denies having any dyspepsia, heartburns, nausea, vomiting, or dysphagia. He claims that usually he has two bowel movements everyday. He claimed that many years ago he was told to have an ulcer. He had a negative colonoscopy about five years ago, but does not remember where. PAST MEDICAL HISTORY:  Remarkable for COPD, on home oxygen. He has diastolic congestive heart failure, compensated with Lasix. He has paroxysmal atrial fibrillation, on Eliquis, presently in sinus rhythm. He has stage III kidney disease and chronic severe anemia that has been present since at least last year. In fact, last year there were times where his hemoglobin dropped to 6.7 on . Presently, his hemoglobin is 7.5. It was 8.8 on . Reticulocyte count was normal at 2.1. The iron saturation was 22, vitamin J61 was 689, folic acid 11. We do not have yet occult blood in his stool. The patient claims that he takes occasional NSAIDs like once every three months. He does not smoke or drink alcohol.   He does not have diabetes, coronary artery disease, or history of stroke. He is very hard of hearing. He has a history of prostate cancer, treated with radiation; hip surgery and hernia repair. FAMILY HISTORY:  He has a strong family history of heart disease. No colon cancer. ALLERGIES:  HE IS ALLERGIC TO ASPIRIN AND BACTRIM. MEDICATIONS:  At home takes:  1. Cardizem 30 mg daily. 2.  Norco 5/325 as needed. 3.  Cortisone 40 mg in a tapered fashion. 4.  Doxycycline 100 mg twice a day. 5.  Brovana 15 mcg nebulizer. 6.   0.5 mcg 2 mL twice a day. 7.  Oxygen at 2 L per minute. 8.  Hygroton 25 mg.  9.  Ultram 50 mg p.r.n.  10.  Neurontin 100 mg t.i.d.  11.  Eliquis 5 mg b.i.d.  12.  Ferrous sulfate 325. 13.  Norvasc 5 mg. 14.  Proventil. 15.  Lasix 20 mg. 16.  Flomax 0.4 mg. PHYSICAL EXAMINATION:  GENERAL:  The patient is short of breath. He claims that he is able to walk. He is on oxygen. He is very hard of hearing. Appears to be in no distress. VITAL SIGNS:  Weight is 165 pounds. Temperature 98, pulse 65, blood pressure 121/47, breathing 15, saturation 99% on nasal cannula at 2 L per minute. SKIN:  Very dry. HEENT:  The eyes are remarkable for pale conjunctivae. The sclerae are anicteric. The pupils are equal and reactive to light. He has cataract on the left side. Oropharyngeal cavity has dry and pale mucous membranes. Many missing teeth. NECK:  Unremarkable. No palpable mass. No enlarged thyroid. LUNGS:  Remarkable for diffuse air entry. HEART:  Cardiac rhythm is regular. S1 and S2 are distant, but no murmur. ABDOMEN:  Not distended, soft, not tender. No mass or organomegaly. Bowel sounds are normal.  EXTREMITIES:  No leg edema. He does have diffuse onychodystrophy in his both feet. NEUROLOGIC:  He is alert and oriented. Moves all his four extremities. LABORATORY DATA:  Blood Tests:  WBC 7.7, hemoglobin 7.5, normal MCV and MCH, platelets 429. Normal differential.  Reticulocyte count 2.1. Sedimentation rate 134 a year ago on 04/18/2021. Urinalysis at this time is negative. Complete metabolic panel, normal electrolytes. BUN 28, creatinine 1.1. Normal LFT. Albumin is 2.1. Vitamin B12 of 249. Folic acid 42.0. Hemoglobin A1c 5.5. Iron saturation was 22 on 10/03/2021. I do not have any more recent value. ASSESSMENT AND PLAN:  In conclusion, this is a 77-year-old male with severe chronic obstructive pulmonary disease, on home oxygen. He has diastolic congestive heart failure; paroxysmal atrial fibrillation, on Eliquis, who has chronic anemia of unknown cause, possibly iron deficiency. There is an abnormal finding in the duodenum on CT scan suggestive possibly of peptic ulcer disease, although the Helicobacter pylori serology was negative. The patient is currently asymptomatic. We are going to do an endoscopy to investigate this further. I explained to the patient the procedure of EGD . We have to make sure that we check the occult blood in his stools. Repeat iron saturation.           MD CHANTELL Carrillo [id number]  D:  04/08/2022 17:10  T:  04/08/2022 22:52  JOB #:  [id number]  CC:

## 2022-04-09 NOTE — PROGRESS NOTES
Hospitalist Progress Note    Patient: Debbie Ge MRN: [id number]  CSN: [id number]    YOB: 1945  Age: 64 y.o. Sex: male    DOA: 4/7/2022 LOS:  LOS: 2 days                Assessment and Plan:    Principal Problem:    MRSA bacteremia (4/7/2022)    Active Problems:    Hypertension ()      Chronic obstructive pulmonary disease ([address] (4/20/2018)      Chronic renal disease, stage 3, moderately decreased glomerular filtration rate between 30-59 mL/min/1.73 square meter ([facility]) (7/20/2018)      Age-related physical debility (7/8/2019)      Acute on chronic respiratory failure with hypoxemia ([facility]) (8/19/2019)      PAF (paroxysmal atrial fibrillation) ([address] (4/5/2021)      Acute exacerbation of chronic obstructive pulmonary disease (COPD) ([facility] (10/5/2021)      Acute deep vein thrombosis (DVT) of femoral vein of right lower extremity ([address] (2/3/2022)      Gastritis and duodenitis (4/2/2022)      On supplemental oxygen by nasal cannula (4/2/2022)      Bladder wall thickening (4/2/2022)          1.  Methicillin-resistant Staphylococcus aureus bacteremia. Vanco, repeats pending, echo was ok  ID following and possibly we will need a NATY Monday   Hard to know where source is . .. multiple possibilities    2.  Duodenitis with possible foreign body in a diverticulum. EGD showed 2 ulcers with unclear etiology. GI is working up more. 3.  Chronic obstructive pulmonary disease, chronic oxygen dependence. No ac exacerbation  4.  Chronic diastolic heart failure. Compensated, daily lasix  5.  Chronic atrial fibrillation. can restart eliquis tomorrow  6.  Chronic kidney disease, stage III.   7.Anemia, severe, check occult stool, and send iron profile     Continue IV antibiotics and further evaluation      Chief complaint:  63 yo male on home 02 recently in for COPD exacerbation, came in to ER for abd pain again 2 days ago, then had blood cx which returned positive and he was called in for admission      Subjective:    He feels ok but is hungry and wants a snack. No new problems noted      Review of systems:    General: No fevers or chills. Cardiovascular: No chest pain or pressure. No palpitations. Pulmonary: No shortness of breath. Gastrointestinal: No nausea, vomiting. Objective:    Vital signs/Intake and Output:  Visit Vitals  BP (!) 110/39   Pulse 82   Temp 98.2 °F (36.8 °C)   Resp 16   Ht 5' 8\" (1.727 m)   Wt 74.8 kg (165 lb)   SpO2 97%   BMI 25.09 kg/m²     Current Shift:  No intake/output data recorded. Last three shifts:  No intake/output data recorded. Physical Exam:  General: NAD, AAOx3. Non-toxic. HEENT: NC/AT. PERRLA, EOMI.  MMM. Lungs: Nml inspection. CTA B/L. No wheezing, rales or rhonchi. Heart:  S1S2 RRR,  PMI mid 5th IC space. No M/RG. Abdomen: Soft, NT/ND.  BS+. No peritoneal signs. Extremities: No C/C/E. Psych:   Nml affect. Neurologic:  2-12 intact. Strength 5/5 throughout. Sensation symmetrical.          Labs: Results:       Chemistry Recent Labs     04/09/22  0540 04/08/22  0214 04/07/22  1022   GLU 99 98 89    140 141   K 3.9 3.7 3.5    105 104   CO2 33* 33* 32   BUN 27* 28* 26*   CREA 1.30 1.10 1.11   CA 6.9* 6.4* 7.1*   AGAP 2* 2* 5   BUCR 21* 25* 23*   AP 70 63 90   TP 5.1* 4.8* 6.0*   ALB 2.1* 2.1* 2.9*   GLOB 3.0 2.7 3.1   AGRAT 0.7* 0.8 0.9      CBC w/Diff Recent Labs     04/09/22  0540 04/08/22  0214 04/07/22  1022   WBC 8.2 7.7 10.0   RBC 2.92* 2.74* 3.73*   HGB 8.1* 7.5* 10.1*   HCT 25.9* 24.0* 33.1*    318 424*   GRANS 71 69 73   LYMPH 13* 19* 20*   EOS 6* 3 1      Cardiac Enzymes No results for input(s): CPK, CKND1, WILLARD in the last 72 hours. No lab exists for component: CKRMB, TROIP   Coagulation No results for input(s): PTP, INR, APTT, INREXT in the last 72 hours.     Lipid Panel Lab Results   Component Value Date/Time    Cholesterol, total 172 04/02/2022 03:23 AM    HDL Cholesterol 39 (L) 04/02/2022 03:23 AM    LDL, calculated 109.2 (H) 04/02/2022 03:23 AM    VLDL, calculated 23.8 04/02/2022 03:23 AM    Triglyceride 119 04/02/2022 03:23 AM    CHOL/HDL Ratio 4.4 04/02/2022 03:23 AM      BNP No results for input(s): BNPP in the last 72 hours.    Liver Enzymes Recent Labs     04/09/22  0540   TP 5.1*   ALB 2.1*   AP 70      Thyroid Studies Lab Results   Component Value Date/Time    TSH 3.06 04/05/2021 01:30 PM        Procedures/imaging: see electronic medical records for all procedures/Xrays and details which were not copied into this note but were reviewed prior to creation of Plan

## 2022-04-09 NOTE — ROUTINE PROCESS
Bedside and Verbal shift change report given to Kale Calderon RN (oncoming nurse) by Melchor Mirza RN   (offgoing nurse). Report included the following information SBAR, Kardex, Intake/Output and Recent Results.

## 2022-04-09 NOTE — PROGRESS NOTES
Assumed care of patient from night shift nurse. Patient sitting up eating breakfast.  No  Complaints at this time. He is extremely hard of hearing, and does not have hearing aids.

## 2022-04-10 LAB
ALBUMIN SERPL-MCNC: 2.1 G/DL (ref 3.4–5)
ALBUMIN/GLOB SERPL: 0.9 {RATIO} (ref 0.8–1.7)
ALP SERPL-CCNC: 69 U/L (ref 45–117)
ALT SERPL-CCNC: 11 U/L (ref 16–61)
ANION GAP SERPL CALC-SCNC: 8 MMOL/L (ref 3–18)
AST SERPL-CCNC: 7 U/L (ref 10–38)
BASOPHILS # BLD: 0.1 K/UL (ref 0–0.1)
BASOPHILS NFR BLD: 1 % (ref 0–2)
BILIRUB SERPL-MCNC: 0.2 MG/DL (ref 0.2–1)
BUN SERPL-MCNC: 40 MG/DL (ref 7–18)
BUN/CREAT SERPL: 27 (ref 12–20)
CALCIUM SERPL-MCNC: 6.3 MG/DL (ref 8.5–10.1)
CHLORIDE SERPL-SCNC: 104 MMOL/L (ref 100–111)
CO2 SERPL-SCNC: 29 MMOL/L (ref 21–32)
CREAT SERPL-MCNC: 1.48 MG/DL (ref 0.6–1.3)
DIFFERENTIAL METHOD BLD: ABNORMAL
EOSINOPHIL # BLD: 0.6 K/UL (ref 0–0.4)
EOSINOPHIL NFR BLD: 7 % (ref 0–5)
ERYTHROCYTE [DISTWIDTH] IN BLOOD BY AUTOMATED COUNT: 15.6 % (ref 11.6–14.5)
GLOBULIN SER CALC-MCNC: 2.3 G/DL (ref 2–4)
GLUCOSE SERPL-MCNC: 145 MG/DL (ref 74–99)
HCT VFR BLD AUTO: 24.3 % (ref 36–48)
HGB BLD-MCNC: 7.5 G/DL (ref 13–16)
IMM GRANULOCYTES # BLD AUTO: 0.3 K/UL (ref 0–0.04)
IMM GRANULOCYTES NFR BLD AUTO: 4 % (ref 0–0.5)
LYMPHOCYTES # BLD: 1.3 K/UL (ref 0.9–3.6)
LYMPHOCYTES NFR BLD: 17 % (ref 21–52)
MCH RBC QN AUTO: 27.8 PG (ref 24–34)
MCHC RBC AUTO-ENTMCNC: 30.9 G/DL (ref 31–37)
MCV RBC AUTO: 90 FL (ref 78–100)
MONOCYTES # BLD: 0.8 K/UL (ref 0.05–1.2)
MONOCYTES NFR BLD: 10 % (ref 3–10)
NEUTS SEG # BLD: 4.5 K/UL (ref 1.8–8)
NEUTS SEG NFR BLD: 60 % (ref 40–73)
NRBC # BLD: 0 K/UL (ref 0–0.01)
NRBC BLD-RTO: 0 PER 100 WBC
PLATELET # BLD AUTO: 289 K/UL (ref 135–420)
PMV BLD AUTO: 9.4 FL (ref 9.2–11.8)
POTASSIUM SERPL-SCNC: 3.6 MMOL/L (ref 3.5–5.5)
PROT SERPL-MCNC: 4.4 G/DL (ref 6.4–8.2)
RBC # BLD AUTO: 2.7 M/UL (ref 4.35–5.65)
SODIUM SERPL-SCNC: 141 MMOL/L (ref 136–145)
WBC # BLD AUTO: 7.5 K/UL (ref 4.6–13.2)

## 2022-04-10 PROCEDURE — 65270000029 HC RM PRIVATE

## 2022-04-10 PROCEDURE — 85025 COMPLETE CBC W/AUTO DIFF WBC: CPT

## 2022-04-10 PROCEDURE — 77010033678 HC OXYGEN DAILY

## 2022-04-10 PROCEDURE — 94640 AIRWAY INHALATION TREATMENT: CPT

## 2022-04-10 PROCEDURE — 36415 COLL VENOUS BLD VENIPUNCTURE: CPT

## 2022-04-10 PROCEDURE — 74011250637 HC RX REV CODE- 250/637: Performed by: INTERNAL MEDICINE

## 2022-04-10 PROCEDURE — 74011000250 HC RX REV CODE- 250: Performed by: HOSPITALIST

## 2022-04-10 PROCEDURE — 94760 N-INVAS EAR/PLS OXIMETRY 1: CPT

## 2022-04-10 PROCEDURE — 74011250637 HC RX REV CODE- 250/637: Performed by: HOSPITALIST

## 2022-04-10 PROCEDURE — 74011250636 HC RX REV CODE- 250/636: Performed by: HOSPITALIST

## 2022-04-10 PROCEDURE — 80053 COMPREHEN METABOLIC PANEL: CPT

## 2022-04-10 RX ADMIN — GABAPENTIN 100 MG: 100 CAPSULE ORAL at 10:08

## 2022-04-10 RX ADMIN — TAMSULOSIN HYDROCHLORIDE 0.4 MG: 0.4 CAPSULE ORAL at 17:49

## 2022-04-10 RX ADMIN — ARFORMOTEROL TARTRATE 15 MCG: 15 SOLUTION RESPIRATORY (INHALATION) at 20:31

## 2022-04-10 RX ADMIN — ARFORMOTEROL TARTRATE 15 MCG: 15 SOLUTION RESPIRATORY (INHALATION) at 07:54

## 2022-04-10 RX ADMIN — PANTOPRAZOLE SODIUM 40 MG: 40 TABLET, DELAYED RELEASE ORAL at 06:38

## 2022-04-10 RX ADMIN — AMLODIPINE BESYLATE 10 MG: 5 TABLET ORAL at 10:08

## 2022-04-10 RX ADMIN — BUDESONIDE 500 MCG: 0.5 INHALANT RESPIRATORY (INHALATION) at 07:54

## 2022-04-10 RX ADMIN — PANTOPRAZOLE SODIUM 40 MG: 40 TABLET, DELAYED RELEASE ORAL at 15:34

## 2022-04-10 RX ADMIN — FUROSEMIDE 20 MG: 20 TABLET ORAL at 10:08

## 2022-04-10 RX ADMIN — BUDESONIDE 500 MCG: 0.5 INHALANT RESPIRATORY (INHALATION) at 20:31

## 2022-04-10 RX ADMIN — GABAPENTIN 100 MG: 100 CAPSULE ORAL at 21:46

## 2022-04-10 RX ADMIN — VANCOMYCIN HYDROCHLORIDE 1250 MG: 10 INJECTION, POWDER, LYOPHILIZED, FOR SOLUTION INTRAVENOUS at 15:32

## 2022-04-10 RX ADMIN — GABAPENTIN 100 MG: 100 CAPSULE ORAL at 15:32

## 2022-04-10 NOTE — PROGRESS NOTES
[facility]  (A Division of [address] [place] [address])      ID Follow Up Note      Date of Admission: 4/7/2022      Date of Note: 4/10/2022    Reason for FU: MRSA bacteremia      Current Antimicrobials:    Prior Antimicrobials:  1. Vanco IV 4/7 to date        Assessment: Rec / Plan:   Community onset high- grade MRSA bacteremia  BCX- 4/6- 3/4 + MRSA + CoNS- 1/4   BCX: 4/7- NGSF  BCX 4/8-NGSF  TTE--4/8-- no finding of IE on report    CoNS bacteremia-- likely contaminant   ->Vanco #3    Pharmacy to dose, monitor Cr closely for toxicity  If Cr continues to creep up, will switch to daptomycin     [address] 4/7 and 4/8-- remain negative-- will need long term PICC for abx     NATY indicated for further evaluation of endocarditis   Gastritis  Not my monkey, but I imagine MRSA could come from any disrupted endothelium. IT would be odd. COPD  --+ MRSA in Epic 06/2020 and 08/2019  I'm speculating the MRSA came from previous multiple admissions for his COPD attacks    CKD Stage II  Better this admission with IVF support    Hard of Hearing  Shout into his L ear. Subjective:  Pt seen and examined. Sitting up and eating meal.   Denies SOB/cough/F/C/R  No issue with current ABX    Notes and labs reviewed. Imaging reports reviewed- today             HPI:  CC:  \"My stomach has been hurting\"  Mr Angelo Stone is a 63y WM with COPD/frequent admission here for that over last 3m who was called back yesterday after a visit to the ED 4/6 popped (+) MRSA (one set out of both arms); these grew within 18h apiece. He feels fine. No f/s/c. Tired/fatigued, but ascribes that to his lungs. Prior (+) MRSA infection wounds.     Retired          Microbiology:  4/8 - BCx sent  `   4/7 - BCx 2/2 (-)       4/6 - BCx 2/2 (+) MRSA with one set also growing CoNS      Lines / Catheters: peripheral    Active Hospital Problems    Diagnosis Date Noted    MRSA bacteremia 04/07/2022    On supplemental oxygen by nasal cannula 04/02/2022    Gastritis and duodenitis 04/02/2022    Bladder wall thickening 04/02/2022    Acute deep vein thrombosis (DVT) of femoral vein of right lower extremity ([facility] 02/03/2022    Acute exacerbation of chronic obstructive pulmonary disease (COPD) ([facility] 10/05/2021    PAF (paroxysmal atrial fibrillation) ([facility] 04/05/2021    Acute on chronic respiratory failure with hypoxemia (HCC) 08/19/2019    Age-related physical debility 07/08/2019    Chronic renal disease, stage 3, moderately decreased glomerular filtration rate between 30-59 mL/min/1.73 square meter (HCC) 07/20/2018    Chronic obstructive pulmonary disease ([address] 04/20/2018    Hypertension      Past Medical History:   Diagnosis Date    BMI 30.0-30.9,adult 4/2/2022    Brain aneurysm     Brain aneurysm     Cancer [facility])     prostate    CH[facility]congestive heart failure) [facility])     CKD (chronic kidney disease)     Closed nondisplaced supracondylar fracture of lower end of left femur without intracondylar extension with delayed healing     COPD (chronic obstructive pulmonary disease) ([facility]     Debility     History of home oxygen therapy     [place] (hard of hearing)     Hypertension     Nocturia     On home oxygen therapy     PAF (paroxysmal atrial fibrillation) ([facility]     Pneumonia     Prostate CA ([facility]     Prostate neoplasm     Radiation effect      Past Surgical History:   Procedure Laterality Date    HX HERNIA REPAIR      HX HIP ARTHROSCOPY  04/09/2021     Family History   Problem Relation Age of Onset    Heart Disease Mother      Social History     Socioeconomic History    Marital status:      Spouse name: Not on file    Number of children: Not on file    Years of education: Not on file    Highest education level: Not on file   Occupational History    Not on file   Tobacco Use    Smoking status: Former Smoker     Types: Cigarettes    Smokeless tobacco: Never Used   Substance and Sexual Activity    Alcohol use: No    Drug use: Never    Sexual activity: Not on file   Other Topics Concern    Not on file   Social History Narrative    Not on file     Social Determinants of Health     Financial Resource Strain:     Difficulty of Paying Living Expenses: Not on file   Food Insecurity:     Worried About Running Out of Food in the Last Year: Not on file    Amaya of Food in the Last Year: Not on file   Transportation Needs:     Lack of Transportation (Medical): Not on file    Lack of Transportation (Non-Medical):  Not on file   Physical Activity:     Days of Exercise per Week: Not on file    Minutes of Exercise per Session: Not on file   Stress:     Feeling of Stress : Not on file   Social Connections:     Frequency of Communication with Friends and Family: Not on file    Frequency of Social Gatherings with Friends and Family: Not on file    Attends Sabianism Services: Not on file    Active Member of [address] [place] [address] [employer] or Organizations: Not on file    Attends Club or Organization Meetings: Not on file    Marital Status: Not on file   Intimate Partner Violence:     Fear of Current or Ex-Partner: Not on file    Emotionally Abused: Not on file    Physically Abused: Not on file    Sexually Abused: Not on file   Housing Stability:     Unable to Pay for Housing in the Last Year: Not on file    Number of [place] in the Last Year: Not on file    Unstable Housing in the Last Year: Not on file       Allergies:  Aspirin and Bactrim [sulfamethoxazole-trimethoprim]     Medications:  Current Facility-Administered Medications   Medication Dose Route Frequency    pantoprazole (PROTONIX) tablet 40 mg  40 mg Oral ACB&D    sodium chloride (NS) flush 5-10 mL  5-10 mL IntraVENous PRN    albuterol-ipratropium (DUO-NEB) 2.5 MG-0.5 MG/3 ML  3 mL Nebulization Q4H PRN    [Held by provider] apixaban (ELIQUIS) tablet 5 mg  5 mg Oral BID    furosemide (LASIX) tablet 20 mg  20 mg Oral DAILY    gabapentin (NEURONTIN) capsule 100 mg 100 mg Oral TID    tamsulosin (FLOMAX) capsule 0.4 mg  0.4 mg Oral QPM    traMADoL (ULTRAM) tablet 50 mg  50 mg Oral Q8H PRN    acetaminophen (TYLENOL) tablet 650 mg  650 mg Oral Q6H PRN    ondansetron (ZOFRAN) injection 4 mg  4 mg IntraVENous Q6H PRN    carboxymethylcellulose sodium (REFRESH PLUS) 0.5 % ophthalmic solution 2 Drop  2 Drop Both Eyes DAILY PRN    vancomycin (VANCOCIN) 1250 mg in  ml infusion  1,250 mg IntraVENous Q24H    Vancomycin Pharmacy To Dose  1 Each Other Rx Dosing/Monitoring    arformoteroL (BROVANA) neb solution 15 mcg  15 mcg Nebulization BID RT    budesonide (PULMICORT) 500 mcg/2 ml nebulizer suspension  500 mcg Nebulization BID RT    amLODIPine (NORVASC) tablet 10 mg  10 mg Oral DAILY    [Held by provider] chlorthalidone (HYGROTON) tablet 25 mg  25 mg Oral DAILY        ROS:  Constitutional: negative for fevers, chills and sweats  Respiratory: negative for cough  Cardiovascular: negative for chest pain, dyspnea  Gastrointestinal: negative for nausea, vomiting and diarrhea  Genitourinary:negative for dysuria     Physical Exam:    Temp (24hrs), Av.3 °F (36.8 °C), Min:97.9 °F (36.6 °C), Max:98.6 °F (37 °C)    Visit Vitals  BP (!) 118/58 (BP 1 Location: Left upper arm, BP Patient Position: At rest)   Pulse 83   Temp 97.9 °F (36.6 °C)   Resp 18   Ht 5' 8\" (1.727 m)   Wt 76.7 kg (169 lb)   SpO2 99%   BMI 25.70 kg/m²       General: Well developed, well nourished 64 y.o.  WM in no acute distress.   ENT: ENT exam normal, no neck nodes or sinus tenderness (+) [place]  Head: normocephalic, without obvious abnormality  Mouth:  mucous membranes moist, pharynx normal without lesions  Neck: supple, symmetrical, trachea midline   Cardio:  regular rate and rhythm, S1, S2 normal, no murmur, click, rub or gallop  Chest: inspection normal - no chest wall deformities or tenderness, respiratory effort normal  Lungs: clear to auscultation, no wheezes or rales and unlabored breathing  Abdomen: soft, non-tender. Bowel sounds normal  Extremities:  no redness or tenderness in the calves or thighs, no edema  Neuro: awake, Alert X3. Hard of hearing- on right ear       Lab results:    Chemistry  Recent Labs     04/10/22  0515 04/09/22  0540 04/08/22 0214   * 99 98    139 140   K 3.6 3.9 3.7    104 105   CO2 29 33* 33*   BUN 40* 27* 28*   CREA 1.48* 1.30 1.10   CA 6.3* 6.9* 6.4*   AGAP 8 2* 2*   BUCR 27* 21* 25*   AP 69 70 63   TP 4.4* 5.1* 4.8*   ALB 2.1* 2.1* 2.1*   GLOB 2.3 3.0 2.7   AGRAT 0.9 0.7* 0.8       CBC w/ Diff  Recent Labs     04/10/22  0515 04/09/22  0540 04/08/22 0214   WBC 7.5 8.2 7.7   RBC 2.70* 2.92* 2.74*   HGB 7.5* 8.1* 7.5*   HCT 24.3* 25.9* 24.0*    309 318   GRANS 60 71 69   LYMPH 17* 13* 19*   EOS 7* 6* 3       Microbiology  All Micro Results     Procedure Component Value Units Date/Time    CULTURE, BLOOD [[id number]] Collected: 04/08/22 0214    Order Status: Completed Specimen: Blood Updated: 04/10/22 0730     Special Requests: NO SPECIAL REQUESTS        Culture result: NO GROWTH 2 DAYS       CULTURE, BLOOD [[id number]] Collected: 04/07/22 1022    Order Status: Completed Specimen: Blood Updated: 04/10/22 0730     Special Requests: NO SPECIAL REQUESTS        Culture result: NO GROWTH 3 DAYS       CULTURE, BLOOD [[id number]] Collected: 04/07/22 1022    Order Status: Completed Specimen: Blood Updated: 04/10/22 0730     Special Requests: NO SPECIAL REQUESTS        Culture result: NO GROWTH 3 DAYS       ENTERIC BACTERIA PANEL, DNA [[id number]] Collected: 04/07/22 1030    Order Status: Canceled Specimen: Stool          Lillian Wilkinson MD  [facility])  Office #:     [phone]  [phone]   Office Fax: [phone]

## 2022-04-10 NOTE — PROGRESS NOTES
End of Shift Note    Bedside shift change report given to Viky TUCKER (oncoming nurse) by Jose Wood RN (offgoing nurse). Report included the following information SBAR, Intake/Output, MAR and Recent Results    Shift worked:  1900-0730     Shift summary and any significant changes:     Patient rested well this shift; no c/o pain/discomfort; PIV patent, dressing clean/dry/intact; skin fragile/dry/intact; patient passing clear maynor urine via urinal, with occasional incontinence; tolerating PO without complication; no complications with antibiotic therapy note/reported; anticoagulant therapy on hold per provider. Concerns for physician to address: Awaiting treatment for gastric ulcer per patient report. Zone phone for oncoming shift:       Activity:  Activity Level: Up with Assistance  Number times ambulated in hallways past shift: 0  Number of times OOB to chair past shift: 0    Cardiac:   Cardiac Monitoring: No      Cardiac Rhythm: Sinus Rhythm    Access:   Current line(s): midline     Genitourinary:   Urinary status: voiding and incontinent    Respiratory:   O2 Device: Nasal cannula  Chronic home O2 use?: YES  Incentive spirometer at bedside: NO       GI:  Last Bowel Movement Date: 04/07/22  Current diet:  ADULT ORAL NUTRITION SUPPLEMENT Breakfast, Lunch, HS Snack; Standard High Calorie/High Protein  ADULT DIET Regular  Passing flatus: YES  Tolerating current diet: YES       Pain Management:   Patient states pain is manageable on current regimen: YES    Skin:  Nikos Score: 20  Interventions: float heels, increase time out of bed, PT/OT consult, limit briefs and nutritional support     Patient Safety:  Fall Score:  Total Score: 1  Interventions: gripper socks and pt to call before getting OOB       Length of Stay:  Expected LOS: 3d 12h  Actual LOS: 3      Jose Wood RN

## 2022-04-10 NOTE — PROGRESS NOTES
Bedside shift change report given to Ger Santos RN (oncoming nurse) by Ruth Blas RN   (offgoing nurse). Report included the following information SBAR, Kardex, Intake/Output and Recent Results.

## 2022-04-10 NOTE — PROGRESS NOTES
Hospitalist Progress Note    Patient: Cierra Arevalo MRN: [id number]  CSN: [id number]    YOB: 1945  Age: 64 y.o. Sex: male    DOA: 4/7/2022 LOS:  LOS: 3 days                Assessment and Plan:    Principal Problem:    MRSA bacteremia (4/7/2022)    Active Problems:    Hypertension ()      Chronic obstructive pulmonary disease ([facility] (4/20/2018)      Chronic renal disease, stage 3, moderately decreased glomerular filtration rate between 30-59 mL/min/1.73 square meter (HCC) (7/20/2018)      Age-related physical debility (7/8/2019)      Acute on chronic respiratory failure with hypoxemia ([facility]) (8/19/2019)      PAF (paroxysmal atrial fibrillation) ([address] (4/5/2021)      Acute exacerbation of chronic obstructive pulmonary disease (COPD) ([facility] (10/5/2021)      Acute deep vein thrombosis (DVT) of femoral vein of right lower extremity ([address] (2/3/2022)      Gastritis and duodenitis (4/2/2022)      On supplemental oxygen by nasal cannula (4/2/2022)      Bladder wall thickening (4/2/2022)          1.  Methicillin-resistant Staphylococcus aureus bacteremia. Vanco, repeats pending, echo was ok  ID following and possibly we will need a NATY Monday   Hard to know where source is . .. multiple possibilities     2.  Duodenitis with possible foreign body in a diverticulum. EGD showed 2 ulcers with unclear etiology. GI is working up more. 3.  Chronic obstructive pulmonary disease, chronic oxygen dependence. No ac exacerbation  4.  Chronic diastolic heart failure. Compensated, daily lasix  We had small discussion about the amount of salt in his chosen snack today  5.  Chronic atrial fibrillation. can restart eliquis today but will await GI as they may want to do another procedure. If not will restart  6.  Chronic kidney disease, stage III.   7.Anemia, severe, iron deficient     it is a little lower today    Continue IV antibiotics and further evaluation  Stool cards pending        Chief complaint:  63 yo male on home 18 recently in for COPD exacerbation, came in to ER for abd pain again 2 days ago, then had blood cx which returned positive and he was called in for admission      Subjective:    No complaints. Happily munching on his potato chips and jelly beans  No issues. Feels pretty good and conversant as long as you shout in his left ear      Review of systems:    General: No fevers or chills. Cardiovascular: No chest pain or pressure. No palpitations. Pulmonary: No shortness of breath. Gastrointestinal: No nausea, vomiting. Objective:    Vital signs/Intake and Output:  Visit Vitals  BP (!) 118/58 (BP 1 Location: Left upper arm, BP Patient Position: At rest)   Pulse 83   Temp 97.9 °F (36.6 °C)   Resp 18   Ht 5' 8\" (1.727 m)   Wt 76.7 kg (169 lb)   SpO2 99%   BMI 25.70 kg/m²     Current Shift:  04/10 0701 - 04/10 1900  In: 480 [P.O.:480]  Out: -   Last three shifts:  04/08 1901 - 04/10 0700  In: 1460 [P.O.:960; I.V.:500]  Out: 1 [Urine:1]    Physical Exam:  General: NAD, AAOx3. Non-toxic. HEENT: NC/AT. PERRLA, EOMI.  MMM. Lungs: Nml inspection. CTA B/L. No wheezing, rales or rhonchi. Heart:  S1S2 RRR,  PMI mid 5th IC space. No M/RG. Abdomen: Soft, NT/ND.  BS+. No peritoneal signs. Extremities: No C/C/E. Psych:   Nml affect. Neurologic:  2-12 intact. Strength 5/5 throughout.   Sensation symmetrical.          Labs: Results:       Chemistry Recent Labs     04/10/22  0515 04/09/22  0540 04/08/22  0214   * 99 98    139 140   K 3.6 3.9 3.7    104 105   CO2 29 33* 33*   BUN 40* 27* 28*   CREA 1.48* 1.30 1.10   CA 6.3* 6.9* 6.4*   AGAP 8 2* 2*   BUCR 27* 21* 25*   AP 69 70 63   TP 4.4* 5.1* 4.8*   ALB 2.1* 2.1* 2.1*   GLOB 2.3 3.0 2.7   AGRAT 0.9 0.7* 0.8      CBC w/Diff Recent Labs     04/10/22  0515 04/09/22  0540 04/08/22  0214   WBC 7.5 8.2 7.7   RBC 2.70* 2.92* 2.74*   HGB 7.5* 8.1* 7.5*   HCT 24.3* 25.9* 24.0*    309 318   GRANS 60 71 69   LYMPH 17* 13* 19*   EOS 7* 6* 3      Cardiac Enzymes No results for input(s): CPK, CKND1, WILLARD in the last 72 hours. No lab exists for component: CKRMB, TROIP   Coagulation No results for input(s): PTP, INR, APTT, INREXT in the last 72 hours. Lipid Panel Lab Results   Component Value Date/Time    Cholesterol, total 172 04/02/2022 03:23 AM    HDL Cholesterol 39 (L) 04/02/2022 03:23 AM    LDL, calculated 109.2 (H) 04/02/2022 03:23 AM    VLDL, calculated 23.8 04/02/2022 03:23 AM    Triglyceride 119 04/02/2022 03:23 AM    CHOL/HDL Ratio 4.4 04/02/2022 03:23 AM      BNP No results for input(s): BNPP in the last 72 hours.    Liver Enzymes Recent Labs     04/10/22  0515   TP 4.4*   ALB 2.1*   AP 69      Thyroid Studies Lab Results   Component Value Date/Time    TSH 3.06 04/05/2021 01:30 PM        Procedures/imaging: see electronic medical records for all procedures/Xrays and details which were not copied into this note but were reviewed prior to creation of Plan

## 2022-04-11 PROBLEM — R78.81 MRSA BACTEREMIA: Status: ACTIVE | Noted: 2022-04-07

## 2022-04-11 PROBLEM — B95.62 MRSA BACTEREMIA: Status: ACTIVE | Noted: 2022-04-07

## 2022-04-11 PROBLEM — R78.81 BACTEREMIA: Status: ACTIVE | Noted: 2022-04-07

## 2022-04-11 LAB
ALBUMIN SERPL-MCNC: 2.1 G/DL (ref 3.4–5)
ALBUMIN/GLOB SERPL: 0.8 {RATIO} (ref 0.8–1.7)
ALP SERPL-CCNC: 69 U/L (ref 45–117)
ALT SERPL-CCNC: 10 U/L (ref 16–61)
ANION GAP SERPL CALC-SCNC: 5 MMOL/L (ref 3–18)
AST SERPL-CCNC: 8 U/L (ref 10–38)
BASOPHILS # BLD: 0 K/UL (ref 0–0.1)
BASOPHILS NFR BLD: 1 % (ref 0–2)
BILIRUB SERPL-MCNC: 0.2 MG/DL (ref 0.2–1)
BUN SERPL-MCNC: 37 MG/DL (ref 7–18)
BUN/CREAT SERPL: 24 (ref 12–20)
CALCIUM SERPL-MCNC: 6.3 MG/DL (ref 8.5–10.1)
CHLORIDE SERPL-SCNC: 104 MMOL/L (ref 100–111)
CO2 SERPL-SCNC: 30 MMOL/L (ref 21–32)
CREAT SERPL-MCNC: 1.53 MG/DL (ref 0.6–1.3)
DIFFERENTIAL METHOD BLD: ABNORMAL
EOSINOPHIL # BLD: 0.7 K/UL (ref 0–0.4)
EOSINOPHIL NFR BLD: 9 % (ref 0–5)
ERYTHROCYTE [DISTWIDTH] IN BLOOD BY AUTOMATED COUNT: 15.7 % (ref 11.6–14.5)
GASTRIN SERPL-MCNC: 43 PG/ML (ref 0–115)
GLOBULIN SER CALC-MCNC: 2.5 G/DL (ref 2–4)
GLUCOSE SERPL-MCNC: 98 MG/DL (ref 74–99)
H PYLORI IGA SER-ACNC: <9 UNITS (ref 0–8.9)
H PYLORI IGG SER IA-ACNC: 0.55 INDEX VALUE (ref 0–0.79)
HCT VFR BLD AUTO: 23.3 % (ref 36–48)
HGB BLD-MCNC: 7.2 G/DL (ref 13–16)
IMM GRANULOCYTES # BLD AUTO: 0.3 K/UL (ref 0–0.04)
IMM GRANULOCYTES NFR BLD AUTO: 4 % (ref 0–0.5)
LYMPHOCYTES # BLD: 1.3 K/UL (ref 0.9–3.6)
LYMPHOCYTES NFR BLD: 18 % (ref 21–52)
MCH RBC QN AUTO: 28 PG (ref 24–34)
MCHC RBC AUTO-ENTMCNC: 30.9 G/DL (ref 31–37)
MCV RBC AUTO: 90.7 FL (ref 78–100)
MONOCYTES # BLD: 0.8 K/UL (ref 0.05–1.2)
MONOCYTES NFR BLD: 11 % (ref 3–10)
NEUTS SEG # BLD: 4.2 K/UL (ref 1.8–8)
NEUTS SEG NFR BLD: 57 % (ref 40–73)
NRBC # BLD: 0 K/UL (ref 0–0.01)
NRBC BLD-RTO: 0 PER 100 WBC
PLATELET # BLD AUTO: 261 K/UL (ref 135–420)
PMV BLD AUTO: 9.3 FL (ref 9.2–11.8)
POTASSIUM SERPL-SCNC: 3.9 MMOL/L (ref 3.5–5.5)
PROT SERPL-MCNC: 4.6 G/DL (ref 6.4–8.2)
RBC # BLD AUTO: 2.57 M/UL (ref 4.35–5.65)
SODIUM SERPL-SCNC: 139 MMOL/L (ref 136–145)
WBC # BLD AUTO: 7.4 K/UL (ref 4.6–13.2)

## 2022-04-11 PROCEDURE — 94760 N-INVAS EAR/PLS OXIMETRY 1: CPT

## 2022-04-11 PROCEDURE — 85025 COMPLETE CBC W/AUTO DIFF WBC: CPT

## 2022-04-11 PROCEDURE — 77010033678 HC OXYGEN DAILY

## 2022-04-11 PROCEDURE — 80053 COMPREHEN METABOLIC PANEL: CPT

## 2022-04-11 PROCEDURE — 65270000029 HC RM PRIVATE

## 2022-04-11 PROCEDURE — 74011250637 HC RX REV CODE- 250/637: Performed by: HOSPITALIST

## 2022-04-11 PROCEDURE — 94640 AIRWAY INHALATION TREATMENT: CPT

## 2022-04-11 PROCEDURE — 36415 COLL VENOUS BLD VENIPUNCTURE: CPT

## 2022-04-11 PROCEDURE — 74011250637 HC RX REV CODE- 250/637: Performed by: INTERNAL MEDICINE

## 2022-04-11 PROCEDURE — 74011250636 HC RX REV CODE- 250/636: Performed by: HOSPITALIST

## 2022-04-11 PROCEDURE — 74011000258 HC RX REV CODE- 258: Performed by: HOSPITALIST

## 2022-04-11 PROCEDURE — 74011000250 HC RX REV CODE- 250: Performed by: HOSPITALIST

## 2022-04-11 RX ADMIN — PANTOPRAZOLE SODIUM 40 MG: 40 TABLET, DELAYED RELEASE ORAL at 17:01

## 2022-04-11 RX ADMIN — TAMSULOSIN HYDROCHLORIDE 0.4 MG: 0.4 CAPSULE ORAL at 17:01

## 2022-04-11 RX ADMIN — BUDESONIDE 500 MCG: 0.5 INHALANT RESPIRATORY (INHALATION) at 07:22

## 2022-04-11 RX ADMIN — ARFORMOTEROL TARTRATE 15 MCG: 15 SOLUTION RESPIRATORY (INHALATION) at 21:10

## 2022-04-11 RX ADMIN — ARFORMOTEROL TARTRATE 15 MCG: 15 SOLUTION RESPIRATORY (INHALATION) at 07:22

## 2022-04-11 RX ADMIN — PANTOPRAZOLE SODIUM 40 MG: 40 TABLET, DELAYED RELEASE ORAL at 06:41

## 2022-04-11 RX ADMIN — IRON SUCROSE 200 MG: 20 INJECTION, SOLUTION INTRAVENOUS at 15:42

## 2022-04-11 RX ADMIN — VANCOMYCIN HYDROCHLORIDE 1250 MG: 10 INJECTION, POWDER, LYOPHILIZED, FOR SOLUTION INTRAVENOUS at 17:01

## 2022-04-11 RX ADMIN — GABAPENTIN 100 MG: 100 CAPSULE ORAL at 23:01

## 2022-04-11 RX ADMIN — AMLODIPINE BESYLATE 10 MG: 5 TABLET ORAL at 09:00

## 2022-04-11 RX ADMIN — BUDESONIDE 500 MCG: 0.5 INHALANT RESPIRATORY (INHALATION) at 21:10

## 2022-04-11 RX ADMIN — GABAPENTIN 100 MG: 100 CAPSULE ORAL at 09:00

## 2022-04-11 RX ADMIN — GABAPENTIN 100 MG: 100 CAPSULE ORAL at 17:01

## 2022-04-11 NOTE — PROGRESS NOTES
End of Shift Note    Bedside shift change report given to [facility] (oncoming nurse) by Perico Jones RN (offgoing nurse). Report included the following information SBAR, Intake/Output, MAR and Recent Results    Shift worked:  1900-0730     Shift summary and any significant changes:    Patient rested quietly this shift; up ad duke w/ RW to BR, dyspnea on exertion, PRN O2 2 LPM NC, expiratory wheezes/grunting noted; patient eats extraordinary amount of snacks food, only stopping to snack while asleep; no c/o pain/discomfort noted/reported. Concerns for physician to address: Patient eager to be d/c   Zone phone for oncoming shift:  [phone]     Activity:  Activity Level: [place] Side of Bed,Up with Assistance  Number times ambulated in hallways past shift: 0  Number of times OOB to chair past shift: 3    Cardiac:   Cardiac Monitoring: No      Cardiac Rhythm: Sinus Rhythm    Access:   Current line(s): midline     Genitourinary:   Urinary status: voiding and incontinent    Respiratory:   O2 Device: Nasal cannula  Chronic home O2 use?: NO  Incentive spirometer at bedside: NO       GI:  Last Bowel Movement Date: 04/10/22  Current diet:  ADULT ORAL NUTRITION SUPPLEMENT Breakfast, Lunch, HS Snack; Standard High Calorie/High Protein  ADULT DIET Regular  Passing flatus: YES  Tolerating current diet: YES       Pain Management:   Patient states pain is manageable on current regimen: YES    Skin:  Nikos Score: 18  Interventions: increase time out of bed, PT/OT consult and nutritional support     Patient Safety:  Fall Score:  Total Score: 1  Interventions: gripper socks and pt to call before getting OOB       Length of Stay:  Expected LOS: 3d 12h  Actual LOS: 4      Perico Jones RN

## 2022-04-11 NOTE — PROGRESS NOTES
D/C Plan: [address] with [facility] and physician follow up vs SNF    Noted pt will likely need long term IVABX. CM contacted pt's wife, David Fraser ([phone]), WR discuss transition of care options to include HH with [facility] vs SNF. Pt's wife would prefer to have pt return home with [facility]. CM offered [facility] and family would like to use [facility] as they have used this agency in the past.  Clinical information has been sent to [employer] [facility] to assist with identifying potential cost for home IVABX. CM to await finial ID recommendation with drug, frequency and duration to assist with transition of care.   CM to continue to follow and assist.

## 2022-04-11 NOTE — PROGRESS NOTES
Comprehensive Nutrition Assessment    Type and Reason for Visit: Reassess    Nutrition Recommendations/Plan: Continue with POC    Nutrition Assessment:  PMHx: COPD, Brain aneurysm, CKD, oxygen dependence, HTN,  Paroxysmal AFib, Prostate cancer, CHF. Admitted for MRSA bacteremia, Duodenitis, anemia. S/p EGD. Malnutrition Assessment:  Malnutrition Status: Moderate malnutrition    Context:  Chronic illness     Findings of the 6 clinical characteristics of malnutrition:   Energy Intake:  No significant decrease in energy intake  Weight Loss:  7.00 - Greater than 10% over 6 months     Body Fat Loss:  1 - Mild body fat loss, Triceps,Orbital,Buccal region   Muscle Mass Loss:  1 - Mild muscle mass loss, Temples (temporalis),Clavicles (pectoralis &deltoids),Hand (interosseous)  Fluid Accumulation:  Unable to assess,    Strength:  Not performed     Estimated Daily Nutrient Needs:  Energy (kcal): 0652-0543; Weight Used for Energy Requirements: Current  Protein (g): 75-82; Weight Used for Protein Requirements: Current (1-1.1g)  Fluid (ml/day): 0393-4675; Method Used for Fluid Requirements: 1 ml/kcal    Nutrition Related Findings:  Labs: GFR est nonAA 44, BUN 37, creatinine 1.53. Med: flomax, protonix, lasix. Pt with good PO % since last assessment. Pt states appetite has been fine. Stated ensure is fair. Pt has been drinking ensure enlive. Will continue to monitor PO and weight while admitted. Wounds:    None       Current Nutrition Therapies:  ADULT ORAL NUTRITION SUPPLEMENT Breakfast, Lunch, HS Snack; Standard High Calorie/High Protein  ADULT DIET Regular    Anthropometric Measures:  · Height:  5' 8\" (172.7 cm)  · Current Body Wt:  75 kg (165 lb 5.5 oz) (4/10/22)   · Admission Body Wt:  164 lb 14.5 oz    · Usual Body Wt:  83.9 kg (185 lb) (10/2021; -10.9% x6m)     · Ideal Body Wt:  154 lbs:  107.4 %   · BMI Category: Overweight (BMI 25.0-29. 9)       Nutrition Diagnosis:   · Moderate malnutrition related to acute injury/trauma as evidenced by mild loss of subcutaneous fat,mild muscle loss,weight loss greater than or equal to 10% in 6 months    Nutrition Interventions:   Food and/or Nutrient Delivery: Continue current diet,Continue oral nutrition supplement  Nutrition Education and Counseling: No recommendations at this time  Coordination of Nutrition Care: Continue to monitor while inpatient    Goals:  PO >75% of nutritional needs throughout the next 3-4 days       Nutrition Monitoring and Evaluation:   Behavioral-Environmental Outcomes: None identified  Food/Nutrient Intake Outcomes: Food and nutrient intake,Supplement intake  Physical Signs/Symptoms Outcomes: Biochemical data,Meal time behavior,Nutrition focused physical findings,Skin,Weight,Hemodynamic status    Discharge Planning:    Continue current diet,Continue oral nutrition supplement     Electronically signed by Charles Cormier RD on 4/11/2022 at 2:30 PM

## 2022-04-11 NOTE — PROGRESS NOTES
Hospitalist Progress Note    Patient: Mil Khan MRN: [id number]  CSN: [id number]    YOB: 1945  Age: 64 y.o. Sex: male    DOA: 4/7/2022 LOS:  LOS: 4 days          Chief Complaint:    bacteremia      Assessment/Plan     MRSA bacteremia-on IV vanco  Chronic resp failure due to COPD on home 02  Chronic diastolic CHF  HTN  Hx of DVT-hold eliquis for anemia and procedures  Duodenitis  Duodenal ulcers-continue PPI, s/p EGD  Severe anemia, iron is low, order venofer X 3 doses  Paroxysmal afib  Elevated Cr-hold lasix, daily BMP    S/p EGD    Needs NATY for eval of bacteremia.  Asked cardiology to see  Hold eliquis for procedure  picc line when ok with ID    Daily labs ordered    Reviewed meds      Disposition :  Patient Active Problem List   Diagnosis Code    Hypertension I10    Chronic obstructive pulmonary disease ([facility]) J44.9    Chronic renal disease, stage 3, moderately decreased glomerular filtration rate between 30-59 mL/min/1.73 square meter ([facility]) N18.30    Age-related physical debility R54    Acute on chronic respiratory failure with hypoxemia ([facility]) J96.21    PAF (paroxysmal atrial fibrillation) ([facility]) I48.0    Avascular necrosis of bone of left hip ([facility]) M87.052    Anemia D64.9    COPD with acute exacerbation ([facility]) J44.1    Acute exacerbation of chronic obstructive pulmonary disease (COPD) ([facility]) J44.1    Loculated pleural effusion J90    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    Right lower lobe lung mass R91.8    Bilateral deafness H91.93    Acute deep vein thrombosis (DVT) of femoral vein of right lower extremity ([facility]) I82.411    Gastritis and duodenitis K29.90    BMI 30.0-30.9,adult Z68.30    On supplemental oxygen by nasal cannula Z78.9    Bladder wall thickening N32.89    MRSA bacteremia R78.81, B95.62       Subjective:    I feel ok  Has no new complaints  Very [place] but can understand when you talk loudly to ear    Review of systems:    Constitutional: denies fevers, chills  Respiratory: denies SOB,   Cardiovascular: denies chest pain  Gastrointestinal: denies nausea, vomiting, diarrhea      Vital signs/Intake and Output:  Visit Vitals  BP (!) 116/52 (BP 1 Location: Left upper arm, BP Patient Position: Reclining)   Pulse 93   Temp 98.2 °F (36.8 °C)   Resp 20   Ht 5' 8\" (1.727 m)   Wt 75 kg (165 lb 6.4 oz)   SpO2 100%   BMI 25.15 kg/m²     Current Shift:  No intake/output data recorded. Last three shifts:  04/09 1901 - 04/11 0700  In: 1900 [P.O.:1650; I.V.:250]  Out: -     Exam:    General: Well developed, elderly WM, alert, NAD, OX3  CVS:Regular rate and rhythm, no M/R/G, S1/S2 heard, no thrill  Lungs:Clear to auscultation bilaterally, no wheezes, rhonchi, or rales  Abdomen: Soft, Nontender, No distention, Normal Bowel sounds  Extremities: No C/C/E, pulses palpable 2+  Neuro:grossly normal , follows commands  Psych:appropriate                Labs: Results:       Chemistry Recent Labs     04/11/22  0515 04/10/22  0515 04/09/22  0540   GLU 98 145* 99    141 139   K 3.9 3.6 3.9    104 104   CO2 30 29 33*   BUN 37* 40* 27*   CREA 1.53* 1.48* 1.30   CA 6.3* 6.3* 6.9*   AGAP 5 8 2*   BUCR 24* 27* 21*   AP 69 69 70   TP 4.6* 4.4* 5.1*   ALB 2.1* 2.1* 2.1*   GLOB 2.5 2.3 3.0   AGRAT 0.8 0.9 0.7*      CBC w/Diff Recent Labs     04/11/22  0515 04/10/22  0515 04/09/22  0540   WBC 7.4 7.5 8.2   RBC 2.57* 2.70* 2.92*   HGB 7.2* 7.5* 8.1*   HCT 23.3* 24.3* 25.9*    289 309   GRANS 57 60 71   LYMPH 18* 17* 13*   EOS 9* 7* 6*      Cardiac Enzymes No results for input(s): CPK, CKND1, WILLARD in the last 72 hours. No lab exists for component: CKRMB, TROIP   Coagulation No results for input(s): PTP, INR, APTT, INREXT in the last 72 hours.     Lipid Panel Lab Results   Component Value Date/Time    Cholesterol, total 172 04/02/2022 03:23 AM    HDL Cholesterol 39 (L) 04/02/2022 03:23 AM    LDL, calculated 109.2 (H) 04/02/2022 03:23 AM    VLDL, calculated 23.8 04/02/2022 03:23 AM    Triglyceride 119 04/02/2022 03:23 AM    CHOL/HDL Ratio 4.4 04/02/2022 03:23 AM      BNP No results for input(s): BNPP in the last 72 hours.    Liver Enzymes Recent Labs     04/11/22  0515   TP 4.6*   ALB 2.1*   AP 69      Thyroid Studies Lab Results   Component Value Date/Time    TSH 3.06 04/05/2021 01:30 PM        Procedures/imaging: see electronic medical records for all procedures/Xrays and details which were not copied into this note but were reviewed prior to creation of Gloria Prado MD

## 2022-04-11 NOTE — PROGRESS NOTES
[address] [facility] Pharmacokinetic Monitoring Service - Vancomycin    Consulting Provider: Lali   Indication: Bloodstream infection  Target Concentration: Goal AUC/MARTÍN 400-600 mg*hr/L  Day of Therapy: 5  Additional Antimicrobials: None    Pertinent Laboratory Values: Wt Readings from Last 1 Encounters:   04/10/22 75 kg (165 lb 6.4 oz)     Temp Readings from Last 1 Encounters:   04/11/22 98 °F (36.7 °C)     No components found for: PROCAL  Estimated Creatinine Clearance: 38.5 mL/min (A) (based on SCr of 1.53 mg/dL (H)). Recent Labs     04/11/22  0515 04/10/22  0515   WBC 7.4 7.5     Assessment:  Date/Time Current Dose Concentration Timing of Concentration (h) AUC   04/09/22 1008 Vanc 1250mg IV Q24H 12.2 mcg/mL 04/09/22 0540 507 mg/L.hr   Note: Serum concentrations collected for AUC dosing may appear elevated if collected in close proximity to the dose administered, this is not necessarily an indication of toxicity    Plan:  Current dosing regimen is therapeutic  Continue current dose. No adjustment required. Repeat vancomycin concentration to be ordered when appropriate.    Vanc random level to be drawn 4/12 @ 1500  Pharmacy will continue to monitor patient and adjust therapy as indicated      ERICH Rodriguez  4/11/2022

## 2022-04-12 LAB
ANION GAP SERPL CALC-SCNC: 3 MMOL/L (ref 3–18)
BASOPHILS # BLD: 0 K/UL (ref 0–0.1)
BASOPHILS NFR BLD: 1 % (ref 0–2)
BUN SERPL-MCNC: 37 MG/DL (ref 7–18)
BUN/CREAT SERPL: 29 (ref 12–20)
CALCIUM SERPL-MCNC: 6.6 MG/DL (ref 8.5–10.1)
CHLORIDE SERPL-SCNC: 104 MMOL/L (ref 100–111)
CO2 SERPL-SCNC: 32 MMOL/L (ref 21–32)
CREAT SERPL-MCNC: 1.28 MG/DL (ref 0.6–1.3)
DIFFERENTIAL METHOD BLD: ABNORMAL
ENDOMYSIUM IGA SER QL: NEGATIVE
EOSINOPHIL # BLD: 0.6 K/UL (ref 0–0.4)
EOSINOPHIL NFR BLD: 9 % (ref 0–5)
ERYTHROCYTE [DISTWIDTH] IN BLOOD BY AUTOMATED COUNT: 15.5 % (ref 11.6–14.5)
GLIADIN PEPTIDE IGA SER-ACNC: 4 UNITS (ref 0–19)
GLIADIN PEPTIDE IGG SER-ACNC: 1 UNITS (ref 0–19)
GLUCOSE SERPL-MCNC: 97 MG/DL (ref 74–99)
HCT VFR BLD AUTO: 23.5 % (ref 36–48)
HEMOCCULT STL QL: NEGATIVE
HGB BLD-MCNC: 7.3 G/DL (ref 13–16)
IGA SERPL-MCNC: 187 MG/DL (ref 61–437)
IMM GRANULOCYTES # BLD AUTO: 0.3 K/UL (ref 0–0.04)
IMM GRANULOCYTES NFR BLD AUTO: 5 % (ref 0–0.5)
LYMPHOCYTES # BLD: 1.2 K/UL (ref 0.9–3.6)
LYMPHOCYTES NFR BLD: 15 % (ref 21–52)
MCH RBC QN AUTO: 28.1 PG (ref 24–34)
MCHC RBC AUTO-ENTMCNC: 31.1 G/DL (ref 31–37)
MCV RBC AUTO: 90.4 FL (ref 78–100)
MONOCYTES # BLD: 1 K/UL (ref 0.05–1.2)
MONOCYTES NFR BLD: 14 % (ref 3–10)
NEUTS SEG # BLD: 4.4 K/UL (ref 1.8–8)
NEUTS SEG NFR BLD: 58 % (ref 40–73)
NRBC # BLD: 0 K/UL (ref 0–0.01)
NRBC BLD-RTO: 0 PER 100 WBC
PLATELET # BLD AUTO: 293 K/UL (ref 135–420)
PMV BLD AUTO: 9.3 FL (ref 9.2–11.8)
POTASSIUM SERPL-SCNC: 4 MMOL/L (ref 3.5–5.5)
RBC # BLD AUTO: 2.6 M/UL (ref 4.35–5.65)
SODIUM SERPL-SCNC: 139 MMOL/L (ref 136–145)
TTG IGA SER-ACNC: <2 U/ML (ref 0–3)
TTG IGG SER-ACNC: <2 U/ML (ref 0–5)
VANCOMYCIN SERPL-MCNC: 19.6 UG/ML (ref 5–40)
WBC # BLD AUTO: 7.6 K/UL (ref 4.6–13.2)

## 2022-04-12 PROCEDURE — 74011000258 HC RX REV CODE- 258: Performed by: HOSPITALIST

## 2022-04-12 PROCEDURE — 74011000250 HC RX REV CODE- 250: Performed by: HOSPITALIST

## 2022-04-12 PROCEDURE — 65270000029 HC RM PRIVATE

## 2022-04-12 PROCEDURE — 74011250637 HC RX REV CODE- 250/637: Performed by: INTERNAL MEDICINE

## 2022-04-12 PROCEDURE — 74011250636 HC RX REV CODE- 250/636: Performed by: HOSPITALIST

## 2022-04-12 PROCEDURE — 74011250637 HC RX REV CODE- 250/637: Performed by: HOSPITALIST

## 2022-04-12 PROCEDURE — 36415 COLL VENOUS BLD VENIPUNCTURE: CPT

## 2022-04-12 PROCEDURE — 80202 ASSAY OF VANCOMYCIN: CPT

## 2022-04-12 PROCEDURE — 80048 BASIC METABOLIC PNL TOTAL CA: CPT

## 2022-04-12 PROCEDURE — 82272 OCCULT BLD FECES 1-3 TESTS: CPT

## 2022-04-12 PROCEDURE — 85025 COMPLETE CBC W/AUTO DIFF WBC: CPT

## 2022-04-12 PROCEDURE — 94640 AIRWAY INHALATION TREATMENT: CPT

## 2022-04-12 RX ADMIN — VANCOMYCIN HYDROCHLORIDE 1250 MG: 10 INJECTION, POWDER, LYOPHILIZED, FOR SOLUTION INTRAVENOUS at 18:01

## 2022-04-12 RX ADMIN — PANTOPRAZOLE SODIUM 40 MG: 40 TABLET, DELAYED RELEASE ORAL at 08:27

## 2022-04-12 RX ADMIN — TAMSULOSIN HYDROCHLORIDE 0.4 MG: 0.4 CAPSULE ORAL at 18:01

## 2022-04-12 RX ADMIN — GABAPENTIN 100 MG: 100 CAPSULE ORAL at 08:27

## 2022-04-12 RX ADMIN — GABAPENTIN 100 MG: 100 CAPSULE ORAL at 15:51

## 2022-04-12 RX ADMIN — BUDESONIDE 500 MCG: 0.5 INHALANT RESPIRATORY (INHALATION) at 20:20

## 2022-04-12 RX ADMIN — IRON SUCROSE 200 MG: 20 INJECTION, SOLUTION INTRAVENOUS at 15:50

## 2022-04-12 RX ADMIN — ARFORMOTEROL TARTRATE 15 MCG: 15 SOLUTION RESPIRATORY (INHALATION) at 20:20

## 2022-04-12 RX ADMIN — AMLODIPINE BESYLATE 10 MG: 5 TABLET ORAL at 08:27

## 2022-04-12 RX ADMIN — PANTOPRAZOLE SODIUM 40 MG: 40 TABLET, DELAYED RELEASE ORAL at 15:51

## 2022-04-12 RX ADMIN — GABAPENTIN 100 MG: 100 CAPSULE ORAL at 23:06

## 2022-04-12 RX ADMIN — BUDESONIDE 500 MCG: 0.5 INHALANT RESPIRATORY (INHALATION) at 07:36

## 2022-04-12 RX ADMIN — ARFORMOTEROL TARTRATE 15 MCG: 15 SOLUTION RESPIRATORY (INHALATION) at 07:36

## 2022-04-12 NOTE — PROGRESS NOTES
D/C Plan: [address] with [facility] and physician follow up vs SNF    Noted plan NATY tomorrow (Wednsday). Please consider ordering therapy services to assist with identifying a transition of care. CM to continue to follow and assist.    Care Management Interventions  Mode of Transport at Discharge: Other (see comment) (Family)  Transition of Care Consult (CM Consult): Discharge [address]: Yes  Health Maintenance Reviewed: Yes  Support Systems: Spouse/Significant Other,Child(prashant)  Confirm Follow Up Transport: Family  The Plan for Transition of Care is Related to the Following Treatment Goals : D/C Plan:  [address] with [facility] and physician follow up vs SNF  The Patient and/or Patient Representative was Provided with a Choice of Provider and Agrees with the Discharge Plan?: Yes  Name of the Patient Representative Who was Provided with a Choice of Provider and Agrees with the Discharge Plan: spouse  Freedom of Choice List was Provided with Basic Dialogue that Supports the Patient's Individualized Plan of Care/Goals, Treatment Preferences and Shares the Quality Data Associated with the Providers?: Yes  Discharge Location  Patient Expects to be Discharged to[de-identified] Home with home health

## 2022-04-12 NOTE — PROGRESS NOTES
[facility]  (A Division of [address] [place] [address])      ID Follow Up Note      Date of Admission: 4/7/2022      Date of Note: 4/12/2022    Reason for FU: MRSA bacteremia    NB: seen prior to noon, charted late    Current Antimicrobials:    Prior Antimicrobials:  1. Vanco IV 4/7 to date        Assessment: Rec / Plan:   Community onset high- grade MRSA bacteremia  BCX- 4/6- 3/4 + MRSA + CoNS- 1/4   BCX: 4/7- NGSF  BCX 4/8-NGSF  TTE--4/8-- no finding of IE on report    CoNS bacteremia-- likely contaminant   ->Vanco #5 1250mg daily    Pharmacy to dose, monitor Cr closely for toxicity  Monitor Cr daily-->is getting better    FU BCX 4/7 and 4/8-- remain negative-- will need long term PICC for abx     NATY for tomorrow arranged -> DW Dr Richard Ambrose     Gastritis  Not my monkey, but I imagine MRSA could come from any disrupted endothelium. IT would be odd. Plan would be at least 4 weeks of IV 4-6 weeks vanco from 4/7/22. COPD  --+ MRSA in Epic 06/2020 and 08/2019  I'm speculating the MRSA came from previous multiple admissions for his COPD attacks    CKD Stage II  Better this admission with IVF support    Hard of Hearing  Shout into his L ear. Subjective:  Pt seen and examined earlier today  Had no complaints of  SOB/cough/F/C/R  No issue with current ABX- N/V/D/F/C/cough    Notes and labs reviewed. Imaging reports reviewed- Cr is getting better. VAnco level at 19             HPI:  CC:  \"My stomach has been hurting\"  Mr Alma Tabares is a 63y WM with COPD/frequent admission here for that over last 3m who was called back yesterday after a visit to the ED 4/6 popped (+) MRSA (one set out of both arms); these grew within 18h apiece. He feels fine. No f/s/c. Tired/fatigued, but ascribes that to his lungs. Prior (+) MRSA infection wounds.     Retired          Microbiology:  4/8 - BCx sent  `   4/7 - BCx 2/2 (-)       4/6 - BCx 2/2 (+) MRSA with one set also growing CoNS      Lines / Catheters: peripheral    Active Hospital Problems    Diagnosis Date Noted    MRSA bacteremia 04/07/2022    On supplemental oxygen by nasal cannula 04/02/2022    Gastritis and duodenitis 04/02/2022    Bladder wall thickening 04/02/2022    Acute deep vein thrombosis (DVT) of femoral vein of right lower extremity ([address] 02/03/2022    Acute exacerbation of chronic obstructive pulmonary disease (COPD) ([facility] 10/05/2021    PAF (paroxysmal atrial fibrillation) ([facility] 04/05/2021    Acute on chronic respiratory failure with hypoxemia (HCC) 08/19/2019    Age-related physical debility 07/08/2019    Chronic renal disease, stage 3, moderately decreased glomerular filtration rate between 30-59 mL/min/1.73 square meter ([facility] 07/20/2018    Chronic obstructive pulmonary disease ([facility] 04/20/2018    Hypertension      Past Medical History:   Diagnosis Date    BMI 30.0-30.9,adult 4/2/2022    Brain aneurysm     Brain aneurysm     Cancer (HCC)     prostate    CHF (congestive heart failure) (HCC)     CKD (chronic kidney disease)     Closed nondisplaced supracondylar fracture of lower end of left femur without intracondylar extension with delayed healing     COPD (chronic obstructive pulmonary disease) ([address]     Debility     History of home oxygen therapy     [place] (hard of hearing)     Hypertension     Nocturia     On home oxygen therapy     PAF (paroxysmal atrial fibrillation) ([address]     Pneumonia     Prostate CA ([facility]     Prostate neoplasm     Radiation effect      Past Surgical History:   Procedure Laterality Date    HX HERNIA REPAIR      HX HIP ARTHROSCOPY  04/09/2021     Family History   Problem Relation Age of Onset    Heart Disease Mother      Social History     Socioeconomic History    Marital status:      Spouse name: Not on file    Number of children: Not on file    Years of education: Not on file    Highest education level: Not on file   Occupational History    Not on file   Tobacco Use    Smoking status: Former Smoker     Types: Cigarettes    Smokeless tobacco: Never Used   Substance and Sexual Activity    Alcohol use: No    Drug use: Never    Sexual activity: Not on file   Other Topics Concern    Not on file   Social History Narrative    Not on file     Social Determinants of Health     Financial Resource Strain:     Difficulty of Paying Living Expenses: Not on file   Food Insecurity:     Worried About Running Out of Food in the Last Year: Not on file    Amaya of Food in the Last Year: Not on file   Transportation Needs:     Lack of Transportation (Medical): Not on file    Lack of Transportation (Non-Medical):  Not on file   Physical Activity:     Days of Exercise per Week: Not on file    Minutes of Exercise per Session: Not on file   Stress:     Feeling of Stress : Not on file   Social Connections:     Frequency of Communication with Friends and Family: Not on file    Frequency of Social Gatherings with Friends and Family: Not on file    Attends Mandaeism Services: Not on file    Active Member of [address] [place] [address] or Organizations: Not on file    Attends Club or Organization Meetings: Not on file    Marital Status: Not on file   Intimate Partner Violence:     Fear of Current or Ex-Partner: Not on file    Emotionally Abused: Not on file    Physically Abused: Not on file    Sexually Abused: Not on file   Housing Stability:     Unable to Pay for Housing in the Last Year: Not on file    Number of [place] in the Last Year: Not on file    Unstable Housing in the Last Year: Not on file       Allergies:  Aspirin and Bactrim [sulfamethoxazole-trimethoprim]     Medications:  Current Facility-Administered Medications   Medication Dose Route Frequency    Vancomycin Random level to be drawn on 4/12 @ 1500  1 Each Other ONCE    iron sucrose (VENOFER) 200 mg in 0.9% sodium chloride 100 mL IVPB  200 mg IntraVENous Q24H    pantoprazole (PROTONIX) tablet 40 mg  40 mg Oral ACB&D    sodium chloride (NS) flush 5-10 mL  5-10 mL IntraVENous PRN    albuterol-ipratropium (DUO-NEB) 2.5 MG-0.5 MG/3 ML  3 mL Nebulization Q4H PRN    [Held by provider] apixaban (ELIQUIS) tablet 5 mg  5 mg Oral BID    [Held by provider] furosemide (LASIX) tablet 20 mg  20 mg Oral DAILY    gabapentin (NEURONTIN) capsule 100 mg  100 mg Oral TID    tamsulosin (FLOMAX) capsule 0.4 mg  0.4 mg Oral QPM    traMADoL (ULTRAM) tablet 50 mg  50 mg Oral Q8H PRN    acetaminophen (TYLENOL) tablet 650 mg  650 mg Oral Q6H PRN    ondansetron (ZOFRAN) injection 4 mg  4 mg IntraVENous Q6H PRN    carboxymethylcellulose sodium (REFRESH PLUS) 0.5 % ophthalmic solution 2 Drop  2 Drop Both Eyes DAILY PRN    vancomycin (VANCOCIN) 1250 mg in  ml infusion  1,250 mg IntraVENous Q24H    Vancomycin Pharmacy To Dose  1 Each Other Rx Dosing/Monitoring    arformoteroL (BROVANA) neb solution 15 mcg  15 mcg Nebulization BID RT    budesonide (PULMICORT) 500 mcg/2 ml nebulizer suspension  500 mcg Nebulization BID RT    amLODIPine (NORVASC) tablet 10 mg  10 mg Oral DAILY        ROS:  Constitutional: negative for fevers, chills and sweats  Respiratory: negative for cough  Cardiovascular: negative for chest pain, dyspnea  Gastrointestinal: negative for nausea, vomiting and diarrhea  Genitourinary:negative for dysuria     Physical Exam:    Temp (24hrs), Av.9 °F (36.6 °C), Min:97.5 °F (36.4 °C), Max:98.4 °F (36.9 °C)    Visit Vitals  BP (!) 109/56   Pulse 81   Temp 98 °F (36.7 °C)   Resp 18   Ht 5' 8\" (1.727 m)   Wt 79.1 kg (174 lb 6.4 oz)   SpO2 99%   BMI 26.52 kg/m²       General: Well developed, well nourished 64 y.o.  WM in no acute distress.     Head: normocephalic, without obvious abnormality  Mouth:  mucous membranes moist, pharynx normal without lesions  Neck: supple, symmetrical, trachea midline   Cardio:  regular rate and rhythm, S1, S2 normal, no murmur, click, rub or gallop  Chest: inspection normal - no chest wall deformities or tenderness, respiratory effort normal  Lungs: clear to auscultation, no wheezes or rales and unlabored breathing  Abdomen: soft, non-tender. Bowel sounds normal  Extremities:  no redness or tenderness in the calves or thighs, no edema  Skin. No peripheral sign of IE. Dry and coarsened skin over hands  Neuro: awake, Alert X3. Hard of hearing- on right ear       Lab results:    Chemistry  Recent Labs     04/12/22  0250 04/11/22  0515 04/10/22  0515   GLU 97 98 145*    139 141   K 4.0 3.9 3.6    104 104   CO2 32 30 29   BUN 37* 37* 40*   CREA 1.28 1.53* 1.48*   CA 6.6* 6.3* 6.3*   AGAP 3 5 8   BUCR 29* 24* 27*   AP  --  69 69   TP  --  4.6* 4.4*   ALB  --  2.1* 2.1*   GLOB  --  2.5 2.3   AGRAT  --  0.8 0.9       CBC w/ Diff  Recent Labs     04/12/22  0250 04/11/22 0515 04/10/22  0515   WBC 7.6 7.4 7.5   RBC 2.60* 2.57* 2.70*   HGB 7.3* 7.2* 7.5*   HCT 23.5* 23.3* 24.3*    261 289   GRANS 58 57 60   LYMPH 15* 18* 17*   EOS 9* 9* 7*       Microbiology  All Micro Results     Procedure Component Value Units Date/Time    CULTURE, BLOOD [[id number]] Collected: 04/08/22 0214    Order Status: Completed Specimen: Blood Updated: 04/12/22 0703     Special Requests: NO SPECIAL REQUESTS        Culture result: NO GROWTH 4 DAYS       CULTURE, BLOOD [[id number]] Collected: 04/07/22 1022    Order Status: Completed Specimen: Blood Updated: 04/12/22 0703     Special Requests: NO SPECIAL REQUESTS        Culture result: NO GROWTH 5 DAYS       CULTURE, BLOOD [[id number]] Collected: 04/07/22 1022    Order Status: Completed Specimen: Blood Updated: 04/12/22 0703     Special Requests: NO SPECIAL REQUESTS        Culture result: NO GROWTH 5 DAYS       ENTERIC BACTERIA PANEL, DNA [[id number]] Collected: 04/07/22 1030    Order Status: Canceled Specimen: Stool          Lillian Olmedo MD  [facility])  Office #:     [phone]   Office Fax: [phone]

## 2022-04-12 NOTE — PROGRESS NOTES
Problem: Falls - Risk of  Goal: *Absence of Falls  Description: Document Maxim Kovacs Fall Risk and appropriate interventions in the flowsheet.   Outcome: Progressing Towards Goal  Note: Fall Risk Interventions:  Mobility Interventions: Bed/chair exit alarm,Patient to call before getting OOB         Medication Interventions: Patient to call before getting OOB    Elimination Interventions: Call light in reach

## 2022-04-12 NOTE — PROGRESS NOTES
Cardiology Progress Note        Patient: Maria Luz Yeboah        Sex: male          DOA: 4/7/2022  YOB: 1945      Age:  64 y.o.        LOS:  LOS: 5 days   Assessment/Plan     Principal Problem:    MRSA bacteremia (4/7/2022)    Active Problems:    Hypertension ()      Chronic obstructive pulmonary disease ([address] (4/20/2018)      Chronic renal disease, stage 3, moderately decreased glomerular filtration rate between 30-59 mL/min/1.73 square meter (HCC) (7/20/2018)      Age-related physical debility (7/8/2019)      Acute on chronic respiratory failure with hypoxemia ([address] (8/19/2019)      PAF (paroxysmal atrial fibrillation) ([address] (4/5/2021)      Acute exacerbation of chronic obstructive pulmonary disease (COPD) ([address] (10/5/2021)      Acute deep vein thrombosis (DVT) of femoral vein of right lower extremity ([address] (2/3/2022)      Gastritis and duodenitis (4/2/2022)      On supplemental oxygen by nasal cannula (4/2/2022)      Bladder wall thickening (4/2/2022)        Plan:  NATY tomorrow  Risks, benefits and alternatives were discussed in detail. Patient's oral hygiene and dental status is poor discussed with patient about risk of trauma to his teeth, bleeding, perforation, respiratory distress and all other associated complication. We will schedule NATY tomorrow with anesthesia. Subjective:    cc: Bacteremia        REVIEW OF SYSTEMS:     General: + fevers or chills. Cardiovascular: No chest pain or pressure. No palpitations. No ankle swelling  Pulmonary: No SOB, orthopnea, PND  Gastrointestinal: No nausea, vomiting or diarrhea      Objective:      Visit Vitals  BP (!) 109/56   Pulse 81   Temp 98 °F (36.7 °C)   Resp 18   Ht 5' 8\" (1.727 m)   Wt 79.1 kg (174 lb 6.4 oz)   SpO2 99%   BMI 26.52 kg/m²     Body mass index is 26.52 kg/m². Physical Exam:  General Appearance: Comfortable, eating his dinner  NECK: No JVD, no thyroidomeglay.    LUNGS: Clear bilaterally. HEART: S1+S2 audible,    ABD: Non-tender, BS Audible    EXT: No edema, and no cysnosis. VASCULAR EXAM: Pulses are intact. PSYCHIATRIC EXAM: Mood is appropriate. Medication:  Current Facility-Administered Medications   Medication Dose Route Frequency    Vancomycin Random level to be drawn on 4/12 @ 1500  1 Each Other ONCE    iron sucrose (VENOFER) 200 mg in 0.9% sodium chloride 100 mL IVPB  200 mg IntraVENous Q24H    pantoprazole (PROTONIX) tablet 40 mg  40 mg Oral ACB&D    sodium chloride (NS) flush 5-10 mL  5-10 mL IntraVENous PRN    albuterol-ipratropium (DUO-NEB) 2.5 MG-0.5 MG/3 ML  3 mL Nebulization Q4H PRN    [Held by provider] apixaban (ELIQUIS) tablet 5 mg  5 mg Oral BID    [Held by provider] furosemide (LASIX) tablet 20 mg  20 mg Oral DAILY    gabapentin (NEURONTIN) capsule 100 mg  100 mg Oral TID    tamsulosin (FLOMAX) capsule 0.4 mg  0.4 mg Oral QPM    traMADoL (ULTRAM) tablet 50 mg  50 mg Oral Q8H PRN    acetaminophen (TYLENOL) tablet 650 mg  650 mg Oral Q6H PRN    ondansetron (ZOFRAN) injection 4 mg  4 mg IntraVENous Q6H PRN    carboxymethylcellulose sodium (REFRESH PLUS) 0.5 % ophthalmic solution 2 Drop  2 Drop Both Eyes DAILY PRN    vancomycin (VANCOCIN) 1250 mg in  ml infusion  1,250 mg IntraVENous Q24H    Vancomycin Pharmacy To Dose  1 Each Other Rx Dosing/Monitoring    arformoteroL (BROVANA) neb solution 15 mcg  15 mcg Nebulization BID RT    budesonide (PULMICORT) 500 mcg/2 ml nebulizer suspension  500 mcg Nebulization BID RT    amLODIPine (NORVASC) tablet 10 mg  10 mg Oral DAILY               Lab/Data Reviewed:  Procedures/imaging: see electronic medical records for all procedures/Xrays   and details which were not copied into this note but were reviewed prior to creation of Plan       All lab results for the last 24 hours reviewed.      Recent Labs     04/12/22  0250 04/11/22  0515 04/10/22  0515   WBC 7.6 7.4 7.5   HGB 7.3* 7.2* 7.5*   HCT 23.5* 23.3* 24.3*    261 289     Recent Labs     04/12/22  0250 04/11/22  0515 04/10/22  0515    139 141   K 4.0 3.9 3.6    104 104   CO2 32 30 29   GLU 97 98 145*   BUN 37* 37* 40*   CREA 1.28 1.53* 1.48*   CA 6.6* 6.3* 6.3*       RADIOLOGY:  CT Results  (Last 48 hours)    None        CXR Results  (Last 48 hours)    None            Cardiology Procedures:   Results for orders placed or performed during the hospital encounter of 04/01/22   EKG, 12 LEAD, INITIAL   Result Value Ref Range    Ventricular Rate 96 BPM    Atrial Rate 96 BPM    P-R Interval 144 ms    QRS Duration 76 ms    Q-T Interval 364 ms    QTC Calculation (Bezet) 459 ms    Calculated P Axis 79 degrees    Calculated R Axis 23 degrees    Calculated T Axis 73 degrees    Diagnosis       Normal sinus rhythm  Normal ECG  When compared with ECG of 19-MAR-2022 19:16,  No significant change was found  Confirmed by Demar Montalvo MD, -- ([id number]) on 4/2/2022 12:42:53 PM        Echo Results  (Last 48 hours)    None       Cardiolite (Tc-99m Sestamibi) stress test    Signed By: Marissa Tobar MD     April 12, 2022

## 2022-04-12 NOTE — CONSULTS
[facility] Consult Note      Patient: Alesia Cowden MRN: [id number]  SSN: [id number]    YOB: 1945  Age: 64 y.o. Sex: male        Date of Consultation: 4/11/2022  Referring Physician: Dr. Salome Gresham  Reason for Consultation: Bacteremia and requiring transesophageal echocardiogram    HPI:  I was asked by Agueda Davies to see this patient for transesophageal echocardiogram.  Alesia Cowden is a 68-year-old gentleman with complex medical history including diastolic heart failure chronic kidney disease chronic oxygen therapy, hypertension, DVT, debility, COPD. Patient is found to have persistent bacteremia.   Dr. Barb Grande have suggested transesophageal echocardiogram.    Past Medical History:   Diagnosis Date    BMI 30.0-30.9,adult 4/2/2022    Brain aneurysm     Brain aneurysm     Cancer [facility])     prostate    CHF (congestive heart failure) (HCC)     CKD (chronic kidney disease)     Closed nondisplaced supracondylar fracture of lower end of left femur without intracondylar extension with delayed healing     COPD (chronic obstructive pulmonary disease) ([address]     Debility     History of home oxygen therapy     [place] (hard of hearing)     Hypertension     Nocturia     On home oxygen therapy     PAF (paroxysmal atrial fibrillation) ([address]     Pneumonia     Prostate CA ([address]     Prostate neoplasm     Radiation effect      Past Surgical History:   Procedure Laterality Date    HX HERNIA REPAIR      HX HIP ARTHROSCOPY  04/09/2021     Current Facility-Administered Medications   Medication Dose Route Frequency    [START ON 4/12/2022] Vancomycin Random level to be drawn on 4/12 @ 1500  1 Each Other ONCE    iron sucrose (VENOFER) 200 mg in 0.9% sodium chloride 100 mL IVPB  200 mg IntraVENous Q24H    pantoprazole (PROTONIX) tablet 40 mg  40 mg Oral ACB&D    sodium chloride (NS) flush 5-10 mL  5-10 mL IntraVENous PRN    albuterol-ipratropium (DUO-NEB) 2.5 MG-0.5 MG/3 ML  3 mL Nebulization Q4H PRN    [Held by provider] apixaban (ELIQUIS) tablet 5 mg  5 mg Oral BID    [Held by provider] furosemide (LASIX) tablet 20 mg  20 mg Oral DAILY    gabapentin (NEURONTIN) capsule 100 mg  100 mg Oral TID    tamsulosin (FLOMAX) capsule 0.4 mg  0.4 mg Oral QPM    traMADoL (ULTRAM) tablet 50 mg  50 mg Oral Q8H PRN    acetaminophen (TYLENOL) tablet 650 mg  650 mg Oral Q6H PRN    ondansetron (ZOFRAN) injection 4 mg  4 mg IntraVENous Q6H PRN    carboxymethylcellulose sodium (REFRESH PLUS) 0.5 % ophthalmic solution 2 Drop  2 Drop Both Eyes DAILY PRN    vancomycin (VANCOCIN) 1250 mg in  ml infusion  1,250 mg IntraVENous Q24H    Vancomycin Pharmacy To Dose  1 Each Other Rx Dosing/Monitoring    arformoteroL (BROVANA) neb solution 15 mcg  15 mcg Nebulization BID RT    budesonide (PULMICORT) 500 mcg/2 ml nebulizer suspension  500 mcg Nebulization BID RT    amLODIPine (NORVASC) tablet 10 mg  10 mg Oral DAILY       Allergies and Intolerances: Allergies   Allergen Reactions    Aspirin Other (comments)     \"messes my stomach up\"    Bactrim [Sulfamethoxazole-Trimethoprim] Unknown (comments)       Family History:   Family History   Problem Relation Age of Onset    Heart Disease Mother        Social History:   He  reports that he has quit smoking. His smoking use included cigarettes. He has never used smokeless tobacco.  He  reports no history of alcohol use. Review of Systems:     Review of Systems  Gen: No fever, chills, malaise, weight loss/gain. Heent: No headache, rhinorrhea, epistaxis, ear pain, hearing loss, sinus pain, neck pain/stiffness, sore throat. Heart: No chest pain, palpitations, KUMAR, pnd, or orthopnea. Resp: No cough, hemoptysis, wheezing and shortness of breath. GI: No nausea, vomiting, diarrhea, constipation, melena or hematochezia. : No urinary obstruction, dysuria or hematuria. Derm: No rash, new skin lesion or pruritis. Musc/skeletal: no bone or joint complains.   Vasc: No edema, cyanosis or claudication. Endo: No heat/cold intolerance, no polyuria,polydipsia or polyphagia. Neuro: No unilateral weakness, numbness, tingling. No seizures. Heme: No easy bruising or bleeding. Physical:   Patient Vitals for the past 6 hrs:   Temp Pulse Resp BP SpO2   04/11/22 1835 98.4 °F (36.9 °C) 81 18 (!) 116/44 90 %   04/11/22 1533 98.7 °F (37.1 °C) 89 18 (!) 106/41 98 %         Exam:   General Appearance: Comfortable, not using accessory muscles of respiration. HEENT: TAMI. HEAD: Atraumatic  NECK: No JVD, no thyroidomeglay. LUNGS: Clear bilaterally. HEART: S1+S2     ABD: Non-tender, BS Audible    EXT: No edema, and no cysnosis. VASCULAR EXAM: Pulses are intact. PSYCHIATRIC EXAM: Mood is appropriate. MUSCULOSKELETAL: Grossly no joint deformity. NEUROLOGICAL: Motor and sensory sytem intact and Cranial nerves II-XII intact.     Review of Data:   LABS:   Lab Results   Component Value Date/Time    WBC 7.4 04/11/2022 05:15 AM    HGB 7.2 (L) 04/11/2022 05:15 AM    HCT 23.3 (L) 04/11/2022 05:15 AM    PLATELET 396 84/91/7029 05:15 AM     Lab Results   Component Value Date/Time    Sodium 139 04/11/2022 05:15 AM    Potassium 3.9 04/11/2022 05:15 AM    Chloride 104 04/11/2022 05:15 AM    CO2 30 04/11/2022 05:15 AM    Glucose 98 04/11/2022 05:15 AM    BUN 37 (H) 04/11/2022 05:15 AM    Creatinine 1.53 (H) 04/11/2022 05:15 AM     Lab Results   Component Value Date/Time    Cholesterol, total 172 04/02/2022 03:23 AM    HDL Cholesterol 39 (L) 04/02/2022 03:23 AM    LDL, calculated 109.2 (H) 04/02/2022 03:23 AM    Triglyceride 119 04/02/2022 03:23 AM     No components found for: GPT  Lab Results   Component Value Date/Time    Hemoglobin A1c 5.5 04/02/2022 03:22 AM       RADIOLOGY:  CT Results  (Last 48 hours)    None        CXR Results  (Last 48 hours)    None            Cardiology Procedures:   Results for orders placed or performed during the hospital encounter of 04/01/22   EKG, 12 LEAD, INITIAL   Result Value Ref Range    Ventricular Rate 96 BPM    Atrial Rate 96 BPM    P-R Interval 144 ms    QRS Duration 76 ms    Q-T Interval 364 ms    QTC Calculation (Bezet) 459 ms    Calculated P Axis 79 degrees    Calculated R Axis 23 degrees    Calculated T Axis 73 degrees    Diagnosis       Normal sinus rhythm  Normal ECG  When compared with ECG of 19-MAR-2022 19:16,  No significant change was found  Confirmed by Philip Toussaint MD, -- ([id number]) on 4/2/2022 12:42:53 PM        Echo Results  (Last 48 hours)    None       Cardiolite (Tc-99m Sestamibi) stress test        Impression / Plan:    Patient Active Problem List   Diagnosis Code    Hypertension I10    Chronic obstructive pulmonary disease ([facility]) J44.9    Chronic renal disease, stage 3, moderately decreased glomerular filtration rate between 30-59 mL/min/1.73 square meter ([facility]) N18.30    Age-related physical debility R54    Acute on chronic respiratory failure with hypoxemia ([facility]) J96.21    PAF (paroxysmal atrial fibrillation) ([facility]) I48.0    Avascular necrosis of bone of left hip ([facility]) M87.052    Anemia D64.9    COPD with acute exacerbation ([facility]) J44.1    Acute exacerbation of chronic obstructive pulmonary disease (COPD) ([facility]) J44.1    Loculated pleural effusion J90    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   I have discussed with patient risk and benefits about transesophageal echocardiogram.  I will schedule the transesophageal echocardiogram on Wednesday  Discussed with Dr. Drea Ferris By: Lisseth Light MD     April 11, 2022

## 2022-04-12 NOTE — PROGRESS NOTES
Verbal report given to Kirt Ariza (oncoming nurse) by CAITLIN Briscoe (offgoing nurse) at this time. Reviewed SBAR, MAR, Kardex.

## 2022-04-12 NOTE — PROGRESS NOTES
[facility] Pharmacokinetic Monitoring Service - Vancomycin    Consulting Provider: Dr. Jacinta Guan ( ID following also )    Indication: bloodstream infection  Target Concentration: Goal AUC/MARTÍN 400-600 mg*hr/L  Day of Therapy: 6  Additional Antimicrobials: none    Pertinent Laboratory Values: Wt Readings from Last 1 Encounters:   04/11/22 79.1 kg (174 lb 6.4 oz)     Temp Readings from Last 1 Encounters:   04/12/22 98 °F (36.7 °C)     No components found for: PROCAL  Estimated Creatinine Clearance: 46 mL/min (based on SCr of 1.28 mg/dL).   Recent Labs     04/12/22  0250 04/11/22  0515   WBC 7.6 7.4     Assessment:  Date/Time Current Dose Concentration Timing of Concentration (h) AUC   4/12/22 Vancomycin 1250 mg IV q24h Random = 19.6  4/12/22 @ 1440 568   Note: Serum concentrations collected for AUC dosing may appear elevated if collected in close proximity to the dose administered, this is not necessarily an indication of toxicity    Plan:  Current dosing regimen is therapeutic (  mg/l.h and trough 18.1 mg/l )   Continue current dose of Vancomycin 1250 mg IV q24h  Keep monitoring renal function ==> SCr improved today   Pharmacy will continue to monitor patient and adjust therapy as indicated    OSBALDO Trevino [facility]  4/12/2022 4:39 PM

## 2022-04-12 NOTE — PROGRESS NOTES
[facility]  (A Division of [address] [place] [address])      ID Follow Up Note      Date of Admission: 4/7/2022      Date of Note: 4/11/2022    Reason for FU: MRSA bacteremia    NB: seen prior to noon, charted late    Current Antimicrobials:    Prior Antimicrobials:  1. Vanco IV 4/7 to date        Assessment: Rec / Plan:   Community onset high- grade MRSA bacteremia  BCX- 4/6- 3/4 + MRSA + CoNS- 1/4   BCX: 4/7- NGSF  BCX 4/8-NGSF  TTE--4/8-- no finding of IE on report    CoNS bacteremia-- likely contaminant   ->Vanco #4 1250mg daily    Pharmacy to dose, monitor Cr closely for toxicity  Monitor Cr daily--> may need change of ABX with Cr creep up continues    FU BCX 4/7 and 4/8-- remain negative-- will need long term PICC for abx     NATY indicated for further evaluation of endocarditis   Gastritis  Not my monkey, but I imagine MRSA could come from any disrupted endothelium. IT would be odd. Plan would be at least 4 weeks of IV 4-6 weeks vanco from 4/7/22. Will await NATY plans from cardiology     COPD  --+ MRSA in [address] [place] [address] Rd 06/2020 and 08/2019  I'm speculating the MRSA came from previous multiple admissions for his COPD attacks    CKD Stage II  Better this admission with IVF support    Hard of Hearing  Shout into his L ear. Subjective:  Pt seen and examined earlier today  Had no complaints of  SOB/cough/F/C/R  No issue with current ABX    Notes and labs reviewed. Imaging reports reviewed- today             HPI:  CC:  \"My stomach has been hurting\"  Mr Chandu Carreno is a 63y WM with COPD/frequent admission here for that over last 3m who was called back yesterday after a visit to the ED 4/6 popped (+) MRSA (one set out of both arms); these grew within 18h apiece. He feels fine. No f/s/c. Tired/fatigued, but ascribes that to his lungs. Prior (+) MRSA infection wounds.     Retired          Microbiology:  4/8 - BCx sent  `   4/7 - BCx 2/2 (-)       4/6 - BCx 2/2 (+) MRSA with one set also growing CoNS      Lines / Catheters: peripheral    Active Hospital Problems    Diagnosis Date Noted    MRSA bacteremia 04/07/2022    On supplemental oxygen by nasal cannula 04/02/2022    Gastritis and duodenitis 04/02/2022    Bladder wall thickening 04/02/2022    Acute deep vein thrombosis (DVT) of femoral vein of right lower extremity ([address] 02/03/2022    Acute exacerbation of chronic obstructive pulmonary disease (COPD) ([address] 10/05/2021    PAF (paroxysmal atrial fibrillation) ([address] 04/05/2021    Acute on chronic respiratory failure with hypoxemia (HCC) 08/19/2019    Age-related physical debility 07/08/2019    Chronic renal disease, stage 3, moderately decreased glomerular filtration rate between 30-59 mL/min/1.73 square meter ([address] 07/20/2018    Chronic obstructive pulmonary disease ([address] 04/20/2018    Hypertension      Past Medical History:   Diagnosis Date    BMI 30.0-30.9,adult 4/2/2022    Brain aneurysm     Brain aneurysm     Cancer (HCC)     prostate    CHF (congestive heart failure) (HCC)     CKD (chronic kidney disease)     Closed nondisplaced supracondylar fracture of lower end of left femur without intracondylar extension with delayed healing     COPD (chronic obstructive pulmonary disease) ([address]     Debility     History of home oxygen therapy     [place] (hard of hearing)     Hypertension     Nocturia     On home oxygen therapy     PAF (paroxysmal atrial fibrillation) (HCC)     Pneumonia     Prostate CA ([address]     Prostate neoplasm     Radiation effect      Past Surgical History:   Procedure Laterality Date    HX HERNIA REPAIR      HX HIP ARTHROSCOPY  04/09/2021     Family History   Problem Relation Age of Onset    Heart Disease Mother      Social History     Socioeconomic History    Marital status:      Spouse name: Not on file    Number of children: Not on file    Years of education: Not on file    Highest education level: Not on file   Occupational History    Not on file   Tobacco Use    Smoking status: Former Smoker     Types: Cigarettes    Smokeless tobacco: Never Used   Substance and Sexual Activity    Alcohol use: No    Drug use: Never    Sexual activity: Not on file   Other Topics Concern    Not on file   Social History Narrative    Not on file     Social Determinants of Health     Financial Resource Strain:     Difficulty of Paying Living Expenses: Not on file   Food Insecurity:     Worried About Running Out of Food in the Last Year: Not on file    Amaya of Food in the Last Year: Not on file   Transportation Needs:     Lack of Transportation (Medical): Not on file    Lack of Transportation (Non-Medical):  Not on file   Physical Activity:     Days of Exercise per Week: Not on file    Minutes of Exercise per Session: Not on file   Stress:     Feeling of Stress : Not on file   Social Connections:     Frequency of Communication with Friends and Family: Not on file    Frequency of Social Gatherings with Friends and Family: Not on file    Attends Hinduism Services: Not on file    Active Member of [address] [place] [address] or Organizations: Not on file    Attends Club or Organization Meetings: Not on file    Marital Status: Not on file   Intimate Partner Violence:     Fear of Current or Ex-Partner: Not on file    Emotionally Abused: Not on file    Physically Abused: Not on file    Sexually Abused: Not on file   Housing Stability:     Unable to Pay for Housing in the Last Year: Not on file    Number of [place] in the Last Year: Not on file    Unstable Housing in the Last Year: Not on file       Allergies:  Aspirin and Bactrim [sulfamethoxazole-trimethoprim]     Medications:  Current Facility-Administered Medications   Medication Dose Route Frequency    [START ON 4/12/2022] Vancomycin Random level to be drawn on 4/12 @ 1500  1 Each Other ONCE    iron sucrose (VENOFER) 200 mg in 0.9% sodium chloride 100 mL IVPB  200 mg IntraVENous Q24H    pantoprazole (PROTONIX) tablet 40 mg  40 mg Oral ACB&D    sodium chloride (NS) flush 5-10 mL  5-10 mL IntraVENous PRN    albuterol-ipratropium (DUO-NEB) 2.5 MG-0.5 MG/3 ML  3 mL Nebulization Q4H PRN    [Held by provider] apixaban (ELIQUIS) tablet 5 mg  5 mg Oral BID    [Held by provider] furosemide (LASIX) tablet 20 mg  20 mg Oral DAILY    gabapentin (NEURONTIN) capsule 100 mg  100 mg Oral TID    tamsulosin (FLOMAX) capsule 0.4 mg  0.4 mg Oral QPM    traMADoL (ULTRAM) tablet 50 mg  50 mg Oral Q8H PRN    acetaminophen (TYLENOL) tablet 650 mg  650 mg Oral Q6H PRN    ondansetron (ZOFRAN) injection 4 mg  4 mg IntraVENous Q6H PRN    carboxymethylcellulose sodium (REFRESH PLUS) 0.5 % ophthalmic solution 2 Drop  2 Drop Both Eyes DAILY PRN    vancomycin (VANCOCIN) 1250 mg in  ml infusion  1,250 mg IntraVENous Q24H    Vancomycin Pharmacy To Dose  1 Each Other Rx Dosing/Monitoring    arformoteroL (BROVANA) neb solution 15 mcg  15 mcg Nebulization BID RT    budesonide (PULMICORT) 500 mcg/2 ml nebulizer suspension  500 mcg Nebulization BID RT    amLODIPine (NORVASC) tablet 10 mg  10 mg Oral DAILY        ROS:  Constitutional: negative for fevers, chills and sweats  Respiratory: negative for cough  Cardiovascular: negative for chest pain, dyspnea  Gastrointestinal: negative for nausea, vomiting and diarrhea  Genitourinary:negative for dysuria     Physical Exam:    Temp (24hrs), Av.2 °F (36.8 °C), Min:97.1 °F (36.2 °C), Max:98.7 °F (37.1 °C)    Visit Vitals  BP (!) 116/44 (BP 1 Location: Left upper arm, BP Patient Position: Sitting)   Pulse 81   Temp 98.4 °F (36.9 °C)   Resp 18   Ht 5' 8\" (1.727 m)   Wt 75 kg (165 lb 6.4 oz)   SpO2 90%   BMI 25.15 kg/m²       General: Well developed, well nourished 64 y.o.  WM in no acute distress.   ENT: ENT exam normal, no neck nodes or sinus tenderness (+) [place]  Head: normocephalic, without obvious abnormality  Mouth:  mucous membranes moist, pharynx normal without lesions  Neck: supple, symmetrical, trachea midline   Cardio:  regular rate and rhythm, S1, S2 normal, no murmur, click, rub or gallop  Chest: inspection normal - no chest wall deformities or tenderness, respiratory effort normal  Lungs: clear to auscultation, no wheezes or rales and unlabored breathing  Abdomen: soft, non-tender. Bowel sounds normal  Extremities:  no redness or tenderness in the calves or thighs, no edema  Skin. No peripheral sign of IE  Neuro: awake, Alert X3.  Hard of hearing- on right ear       Lab results:    Chemistry  Recent Labs     04/11/22  0515 04/10/22  0515 04/09/22  0540   GLU 98 145* 99    141 139   K 3.9 3.6 3.9    104 104   CO2 30 29 33*   BUN 37* 40* 27*   CREA 1.53* 1.48* 1.30   CA 6.3* 6.3* 6.9*   AGAP 5 8 2*   BUCR 24* 27* 21*   AP 69 69 70   TP 4.6* 4.4* 5.1*   ALB 2.1* 2.1* 2.1*   GLOB 2.5 2.3 3.0   AGRAT 0.8 0.9 0.7*       CBC w/ Diff  Recent Labs     04/11/22  0515 04/10/22  0515 04/09/22  0540   WBC 7.4 7.5 8.2   RBC 2.57* 2.70* 2.92*   HGB 7.2* 7.5* 8.1*   HCT 23.3* 24.3* 25.9*    289 309   GRANS 57 60 71   LYMPH 18* 17* 13*   EOS 9* 7* 6*       Microbiology  All Micro Results     Procedure Component Value Units Date/Time    CULTURE, BLOOD [[id number]] Collected: 04/07/22 1022    Order Status: Completed Specimen: Blood Updated: 04/11/22 0721     Special Requests: NO SPECIAL REQUESTS        Culture result: NO GROWTH 4 DAYS       CULTURE, BLOOD [[id number]] Collected: 04/07/22 1022    Order Status: Completed Specimen: Blood Updated: 04/11/22 0721     Special Requests: NO SPECIAL REQUESTS        Culture result: NO GROWTH 4 DAYS       CULTURE, BLOOD [[id number]] Collected: 04/08/22 0214    Order Status: Completed Specimen: Blood Updated: 04/11/22 0721     Special Requests: NO SPECIAL REQUESTS        Culture result: NO GROWTH 3 DAYS       ENTERIC BACTERIA PANEL, DNA [[id number]] Collected: 04/07/22 1030    Order Status: Canceled Specimen: Stool          Lillian COFFMAN Colleen Betts MD  [facility])  Office #:     [phone]  [phone]   Office Fax: [phone]

## 2022-04-12 NOTE — PROGRESS NOTES
Hospitalist Progress Note    Patient: Julisa Mauro MRN: [id number]  CSN: [id number]    YOB: 1945  Age: 64 y.o. Sex: male    DOA: 4/7/2022 LOS:  LOS: 5 days          Chief Complaint:    bacteremia      Assessment/Plan     MRSA bacteremia-on IV vanco   Pending NATY 4/13    Chronic resp failure due to COPD on home 02    Chronic diastolic CHF    HTN  Stab.e   Hx of DVT-hold eliquis for anemia and procedures  Duodenitis  Duodenal ulcers-continue PPI, s/p EGD  Severe anemia, iron is low, order venofer X 3 doses  Paroxysmal afib  Elevated Cr-hold lasix, daily BMP    S/p EGD    Needs NATY for eval of bacteremia.  Asked cardiology to see  Hold eliquis for procedure  picc line when ok with ID    Daily labs ordered    Reviewed meds      Disposition :  Patient Active Problem List   Diagnosis Code    Hypertension I10    Chronic obstructive pulmonary disease ([facility]) J44.9    Chronic renal disease, stage 3, moderately decreased glomerular filtration rate between 30-59 mL/min/1.73 square meter ([facility]) N18.30    Age-related physical debility R54    Acute on chronic respiratory failure with hypoxemia ([facility]) J96.21    PAF (paroxysmal atrial fibrillation) ([facility]) I48.0    Avascular necrosis of bone of left hip ([facility]) M87.052    Anemia D64.9    COPD with acute exacerbation ([facility]) J44.1    Acute exacerbation of chronic obstructive pulmonary disease (COPD) ([facility]) J44.1    Loculated pleural effusion J90    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    Right lower lobe lung mass R91.8    Bilateral deafness H91.93    Acute deep vein thrombosis (DVT) of femoral vein of right lower extremity ([facility]) I82.411    Gastritis and duodenitis K29.90    BMI 30.0-30.9,adult Z68.30    On supplemental oxygen by nasal cannula Z78.9    Bladder wall thickening N32.89    MRSA bacteremia R78.81, B95.62       Subjective:    I feel ok  Has no new complaints  Very [place] but can understand when you talk loudly to ear    Review of systems:    Constitutional: denies fevers, chills  Respiratory: denies SOB,   Cardiovascular: denies chest pain  Gastrointestinal: denies nausea, vomiting, diarrhea      Vital signs/Intake and Output:  Visit Vitals  BP (!) 128/54   Pulse 87   Temp 97.5 °F (36.4 °C)   Resp 20   Ht 5' 8\" (1.727 m)   Wt 79.1 kg (174 lb 6.4 oz)   SpO2 96%   BMI 26.52 kg/m²     Current Shift:  No intake/output data recorded. Last three shifts:  04/10 1901 - 04/12 0700  In: 450 [P.O.:450]  Out: 201 [Urine:200]    Exam:    General: Well developed, elderly WM, alert, NAD, OX3  CVS:Regular rate and rhythm, no M/R/G, S1/S2 heard, no thrill  Lungs:Clear to auscultation bilaterally, no wheezes, rhonchi, or rales  Abdomen: Soft, Nontender, No distention, Normal Bowel sounds  Extremities: No C/C/E, pulses palpable 2+  Neuro:grossly normal , follows commands  Psych:appropriate                Labs: Results:       Chemistry Recent Labs     04/12/22  0250 04/11/22  0515 04/10/22  0515   GLU 97 98 145*    139 141   K 4.0 3.9 3.6    104 104   CO2 32 30 29   BUN 37* 37* 40*   CREA 1.28 1.53* 1.48*   CA 6.6* 6.3* 6.3*   AGAP 3 5 8   BUCR 29* 24* 27*   AP  --  69 69   TP  --  4.6* 4.4*   ALB  --  2.1* 2.1*   GLOB  --  2.5 2.3   AGRAT  --  0.8 0.9      CBC w/Diff Recent Labs     04/12/22  0250 04/11/22  0515 04/10/22  0515   WBC 7.6 7.4 7.5   RBC 2.60* 2.57* 2.70*   HGB 7.3* 7.2* 7.5*   HCT 23.5* 23.3* 24.3*    261 289   GRANS 58 57 60   LYMPH 15* 18* 17*   EOS 9* 9* 7*      Cardiac Enzymes No results for input(s): CPK, CKND1, WILLARD in the last 72 hours. No lab exists for component: CKRMB, TROIP   Coagulation No results for input(s): PTP, INR, APTT, INREXT, INREXT in the last 72 hours.     Lipid Panel Lab Results   Component Value Date/Time    Cholesterol, total 172 04/02/2022 03:23 AM    HDL Cholesterol 39 (L) 04/02/2022 03:23 AM    LDL, calculated 109.2 (H) 04/02/2022 03:23 AM    VLDL, calculated 23.8 04/02/2022 03:23 AM    Triglyceride 119 04/02/2022 03:23 AM    CHOL/HDL Ratio 4.4 04/02/2022 03:23 AM      BNP No results for input(s): BNPP in the last 72 hours.    Liver Enzymes Recent Labs     04/11/22  0515   TP 4.6*   ALB 2.1*   AP 69      Thyroid Studies Lab Results   Component Value Date/Time    TSH 3.06 04/05/2021 01:30 PM        Procedures/imaging: see electronic medical records for all procedures/Xrays and details which were not copied into this note but were reviewed prior to creation of Diamond Loza MD

## 2022-04-13 ENCOUNTER — ANESTHESIA EVENT (OUTPATIENT)
Dept: NON INVASIVE DIAGNOSTICS | Age: 77
DRG: 872 | End: 2022-04-13
Payer: MEDICARE

## 2022-04-13 ENCOUNTER — APPOINTMENT (OUTPATIENT)
Dept: NON INVASIVE DIAGNOSTICS | Age: 77
DRG: 872 | End: 2022-04-13
Attending: INTERNAL MEDICINE
Payer: MEDICARE

## 2022-04-13 ENCOUNTER — ANESTHESIA (OUTPATIENT)
Dept: NON INVASIVE DIAGNOSTICS | Age: 77
DRG: 872 | End: 2022-04-13
Payer: MEDICARE

## 2022-04-13 LAB
ANION GAP SERPL CALC-SCNC: 4 MMOL/L (ref 3–18)
BACTERIA SPEC CULT: NORMAL
BACTERIA SPEC CULT: NORMAL
BASOPHILS # BLD: 0 K/UL (ref 0–0.1)
BASOPHILS NFR BLD: 1 % (ref 0–2)
BUN SERPL-MCNC: 37 MG/DL (ref 7–18)
BUN/CREAT SERPL: 28 (ref 12–20)
CALCIUM SERPL-MCNC: 7 MG/DL (ref 8.5–10.1)
CHLORIDE SERPL-SCNC: 106 MMOL/L (ref 100–111)
CO2 SERPL-SCNC: 30 MMOL/L (ref 21–32)
CREAT SERPL-MCNC: 1.33 MG/DL (ref 0.6–1.3)
DIFFERENTIAL METHOD BLD: ABNORMAL
EOSINOPHIL # BLD: 0.7 K/UL (ref 0–0.4)
EOSINOPHIL NFR BLD: 10 % (ref 0–5)
ERYTHROCYTE [DISTWIDTH] IN BLOOD BY AUTOMATED COUNT: 15.9 % (ref 11.6–14.5)
GLUCOSE SERPL-MCNC: 96 MG/DL (ref 74–99)
HCT VFR BLD AUTO: 23.6 % (ref 36–48)
HGB BLD-MCNC: 7.3 G/DL (ref 13–16)
IMM GRANULOCYTES # BLD AUTO: 0.3 K/UL (ref 0–0.04)
IMM GRANULOCYTES NFR BLD AUTO: 4 % (ref 0–0.5)
INR PPP: 1.1 (ref 0.8–1.2)
LYMPHOCYTES # BLD: 1.1 K/UL (ref 0.9–3.6)
LYMPHOCYTES NFR BLD: 16 % (ref 21–52)
MCH RBC QN AUTO: 28.1 PG (ref 24–34)
MCHC RBC AUTO-ENTMCNC: 30.9 G/DL (ref 31–37)
MCV RBC AUTO: 90.8 FL (ref 78–100)
MONOCYTES # BLD: 0.9 K/UL (ref 0.05–1.2)
MONOCYTES NFR BLD: 12 % (ref 3–10)
NEUTS SEG # BLD: 4.2 K/UL (ref 1.8–8)
NEUTS SEG NFR BLD: 58 % (ref 40–73)
NRBC # BLD: 0 K/UL (ref 0–0.01)
NRBC BLD-RTO: 0 PER 100 WBC
PLATELET # BLD AUTO: 278 K/UL (ref 135–420)
PMV BLD AUTO: 9.1 FL (ref 9.2–11.8)
POTASSIUM SERPL-SCNC: 4.3 MMOL/L (ref 3.5–5.5)
PROTHROMBIN TIME: 13.4 SEC (ref 11.5–15.2)
RBC # BLD AUTO: 2.6 M/UL (ref 4.35–5.65)
SERVICE CMNT-IMP: NORMAL
SERVICE CMNT-IMP: NORMAL
SODIUM SERPL-SCNC: 140 MMOL/L (ref 136–145)
WBC # BLD AUTO: 7.2 K/UL (ref 4.6–13.2)

## 2022-04-13 PROCEDURE — 93312 ECHO TRANSESOPHAGEAL: CPT

## 2022-04-13 PROCEDURE — 85025 COMPLETE CBC W/AUTO DIFF WBC: CPT

## 2022-04-13 PROCEDURE — 74011000250 HC RX REV CODE- 250: Performed by: SPECIALIST

## 2022-04-13 PROCEDURE — 74011250637 HC RX REV CODE- 250/637: Performed by: INTERNAL MEDICINE

## 2022-04-13 PROCEDURE — 74011250636 HC RX REV CODE- 250/636: Performed by: HOSPITALIST

## 2022-04-13 PROCEDURE — 74011250637 HC RX REV CODE- 250/637: Performed by: HOSPITALIST

## 2022-04-13 PROCEDURE — 74011000250 HC RX REV CODE- 250: Performed by: EMERGENCY MEDICINE

## 2022-04-13 PROCEDURE — B24BZZ4 ULTRASONOGRAPHY OF HEART WITH AORTA, TRANSESOPHAGEAL: ICD-10-PCS | Performed by: INTERNAL MEDICINE

## 2022-04-13 PROCEDURE — 77010033678 HC OXYGEN DAILY

## 2022-04-13 PROCEDURE — 36415 COLL VENOUS BLD VENIPUNCTURE: CPT

## 2022-04-13 PROCEDURE — 74011250636 HC RX REV CODE- 250/636: Performed by: SPECIALIST

## 2022-04-13 PROCEDURE — 74011000258 HC RX REV CODE- 258: Performed by: HOSPITALIST

## 2022-04-13 PROCEDURE — 85610 PROTHROMBIN TIME: CPT

## 2022-04-13 PROCEDURE — 94640 AIRWAY INHALATION TREATMENT: CPT

## 2022-04-13 PROCEDURE — 74011000250 HC RX REV CODE- 250: Performed by: HOSPITALIST

## 2022-04-13 PROCEDURE — 65270000029 HC RM PRIVATE

## 2022-04-13 PROCEDURE — 80048 BASIC METABOLIC PNL TOTAL CA: CPT

## 2022-04-13 PROCEDURE — 74011250636 HC RX REV CODE- 250/636: Performed by: NURSE ANESTHETIST, CERTIFIED REGISTERED

## 2022-04-13 RX ORDER — SODIUM CHLORIDE 9 MG/ML
INJECTION, SOLUTION INTRAVENOUS
Status: DISCONTINUED | OUTPATIENT
Start: 2022-04-13 | End: 2022-04-13 | Stop reason: HOSPADM

## 2022-04-13 RX ORDER — SODIUM CHLORIDE, SODIUM LACTATE, POTASSIUM CHLORIDE, CALCIUM CHLORIDE 600; 310; 30; 20 MG/100ML; MG/100ML; MG/100ML; MG/100ML
50 INJECTION, SOLUTION INTRAVENOUS CONTINUOUS
Status: CANCELLED | OUTPATIENT
Start: 2022-04-13

## 2022-04-13 RX ORDER — NALOXONE HYDROCHLORIDE 0.4 MG/ML
0.1 INJECTION, SOLUTION INTRAMUSCULAR; INTRAVENOUS; SUBCUTANEOUS
Status: CANCELLED | OUTPATIENT
Start: 2022-04-13

## 2022-04-13 RX ORDER — ONDANSETRON 2 MG/ML
4 INJECTION INTRAMUSCULAR; INTRAVENOUS ONCE
Status: CANCELLED | OUTPATIENT
Start: 2022-04-13 | End: 2022-04-13

## 2022-04-13 RX ORDER — PROPOFOL 10 MG/ML
INJECTION, EMULSION INTRAVENOUS AS NEEDED
Status: DISCONTINUED | OUTPATIENT
Start: 2022-04-13 | End: 2022-04-13 | Stop reason: HOSPADM

## 2022-04-13 RX ORDER — LIDOCAINE HYDROCHLORIDE 20 MG/ML
INJECTION, SOLUTION EPIDURAL; INFILTRATION; INTRACAUDAL; PERINEURAL AS NEEDED
Status: DISCONTINUED | OUTPATIENT
Start: 2022-04-13 | End: 2022-04-13 | Stop reason: HOSPADM

## 2022-04-13 RX ADMIN — AMLODIPINE BESYLATE 10 MG: 5 TABLET ORAL at 08:56

## 2022-04-13 RX ADMIN — PANTOPRAZOLE SODIUM 40 MG: 40 TABLET, DELAYED RELEASE ORAL at 15:49

## 2022-04-13 RX ADMIN — GABAPENTIN 100 MG: 100 CAPSULE ORAL at 22:39

## 2022-04-13 RX ADMIN — BUDESONIDE 500 MCG: 0.5 INHALANT RESPIRATORY (INHALATION) at 21:15

## 2022-04-13 RX ADMIN — PROPOFOL 50 MG: 10 INJECTION, EMULSION INTRAVENOUS at 12:50

## 2022-04-13 RX ADMIN — BUDESONIDE 500 MCG: 0.5 INHALANT RESPIRATORY (INHALATION) at 08:00

## 2022-04-13 RX ADMIN — ARFORMOTEROL TARTRATE 15 MCG: 15 SOLUTION RESPIRATORY (INHALATION) at 21:16

## 2022-04-13 RX ADMIN — SODIUM CHLORIDE, PRESERVATIVE FREE 10 ML: 5 INJECTION INTRAVENOUS at 14:00

## 2022-04-13 RX ADMIN — SODIUM CHLORIDE, PRESERVATIVE FREE 10 ML: 5 INJECTION INTRAVENOUS at 22:39

## 2022-04-13 RX ADMIN — PROPOFOL 30 MG: 10 INJECTION, EMULSION INTRAVENOUS at 12:55

## 2022-04-13 RX ADMIN — SODIUM CHLORIDE: 900 INJECTION, SOLUTION INTRAVENOUS at 12:16

## 2022-04-13 RX ADMIN — IRON SUCROSE 200 MG: 20 INJECTION, SOLUTION INTRAVENOUS at 15:49

## 2022-04-13 RX ADMIN — VANCOMYCIN HYDROCHLORIDE 1250 MG: 10 INJECTION, POWDER, LYOPHILIZED, FOR SOLUTION INTRAVENOUS at 17:45

## 2022-04-13 RX ADMIN — TAMSULOSIN HYDROCHLORIDE 0.4 MG: 0.4 CAPSULE ORAL at 17:39

## 2022-04-13 RX ADMIN — PROPOFOL 30 MG: 10 INJECTION, EMULSION INTRAVENOUS at 12:53

## 2022-04-13 RX ADMIN — ARFORMOTEROL TARTRATE 15 MCG: 15 SOLUTION RESPIRATORY (INHALATION) at 08:00

## 2022-04-13 RX ADMIN — LIDOCAINE HYDROCHLORIDE 60 MG: 20 INJECTION, SOLUTION EPIDURAL; INFILTRATION; INTRACAUDAL; PERINEURAL at 12:49

## 2022-04-13 RX ADMIN — SODIUM CHLORIDE, PRESERVATIVE FREE 10 ML: 5 INJECTION INTRAVENOUS at 04:30

## 2022-04-13 RX ADMIN — GABAPENTIN 100 MG: 100 CAPSULE ORAL at 15:42

## 2022-04-13 RX ADMIN — PROPOFOL 50 MG: 10 INJECTION, EMULSION INTRAVENOUS at 12:49

## 2022-04-13 RX ADMIN — PANTOPRAZOLE SODIUM 40 MG: 40 TABLET, DELAYED RELEASE ORAL at 08:56

## 2022-04-13 RX ADMIN — GABAPENTIN 100 MG: 100 CAPSULE ORAL at 08:56

## 2022-04-13 RX ADMIN — PROPOFOL 20 MG: 10 INJECTION, EMULSION INTRAVENOUS at 12:57

## 2022-04-13 NOTE — ANESTHESIA PREPROCEDURE EVALUATION
Relevant Problems   RESPIRATORY SYSTEM   (+) Acute exacerbation of chronic obstructive pulmonary disease (COPD) (HCC)   (+) COPD with acute exacerbation (HCC)   (+) Chronic obstructive pulmonary disease (HCC)      CARDIOVASCULAR   (+) Hypertension   (+) PAF (paroxysmal atrial fibrillation) ([facility])      RENAL FAILURE   (+) Chronic renal disease, stage 3, moderately decreased glomerular filtration rate between 30-59 mL/min/1.73 square meter (HCC)      HEMATOLOGY   (+) Anemia       Anesthetic History   No history of anesthetic complications            Review of Systems / Medical History  Nursing notes reviewed and pertinent labs reviewed    Pulmonary    COPD (home O2): moderate               Neuro/Psych   Within defined limits           Cardiovascular    Hypertension      CHF  Dysrhythmias : atrial fibrillation      Exercise tolerance: <4 METS  Comments: Bacteremia - NATY to check valves   GI/Hepatic/Renal         Renal disease: CRI       Endo/Other        Cancer and anemia  Pertinent negatives: Blood dyscrasia: prostate. Other Findings              Physical Exam    Airway  Mallampati: II  TM Distance: 4 - 6 cm  Neck ROM: normal range of motion   Mouth opening: Normal     Cardiovascular               Dental    Dentition: Poor dentition     Pulmonary                 Abdominal         Other Findings            Anesthetic Plan    ASA: 3  Anesthesia type: MAC          Induction: Intravenous  Anesthetic plan and risks discussed with: Patient      Patient very hard of hearing but appropriately answered my questions. Most details from chart. Frail.  Light MAC

## 2022-04-13 NOTE — PROGRESS NOTES
Cardiology Progress Note        Patient: Jacque Colon        Sex: male          DOA: 4/7/2022  YOB: 1945      Age:  64 y.o.        LOS:  LOS: 6 days   Assessment/Plan     NATY done  No evidence of infective.   Complete report to follow  Discussed with Dr. Norman Mims By: Merline Fraise, MD     April 13, 2022

## 2022-04-13 NOTE — PROGRESS NOTES
Pt is all prepped and ready for procedure. Pt very [place], to speak clearly to face and able to read lips. 1245 Pt picked up for procedure    1300 Pt back to care unit, sleepy but alert. 1400 Report called in to primary nurse. 26 Pt taken back to private room, left in stable condition.

## 2022-04-13 NOTE — PROGRESS NOTES
Cardiology Progress Note        Patient: Reginald Botello        Sex: male          DOA: 4/7/2022  YOB: 1945      Age:  64 y.o.        LOS:  LOS: 6 days   Assessment/Plan     Principal Problem:    MRSA bacteremia (4/7/2022)    Active Problems:    Hypertension ()      Chronic obstructive pulmonary disease ([address] (4/20/2018)      Chronic renal disease, stage 3, moderately decreased glomerular filtration rate between 30-59 mL/min/1.73 square meter (HCC) (7/20/2018)      Age-related physical debility (7/8/2019)      Acute on chronic respiratory failure with hypoxemia ([address] (8/19/2019)      PAF (paroxysmal atrial fibrillation) ([address] (4/5/2021)      Acute exacerbation of chronic obstructive pulmonary disease (COPD) ([address] (10/5/2021)      Acute deep vein thrombosis (DVT) of femoral vein of right lower extremity ([address] (2/3/2022)      Gastritis and duodenitis (4/2/2022)      On supplemental oxygen by nasal cannula (4/2/2022)      Bladder wall thickening (4/2/2022)        Plan:  4/13/2022  NATY today  Risks, benefits and alternatives were discussed in detail with the patient. Patient agreed to proceed. NATY tomorrow  Risks, benefits and alternatives were discussed in detail. Patient's oral hygiene and dental status is poor discussed with patient about risk of trauma to his teeth, bleeding, perforation, respiratory distress and all other associated complication. We will schedule NATY tomorrow with anesthesia. Subjective:    cc: Bacteremia        REVIEW OF SYSTEMS:     General: + fevers or chills. Cardiovascular: No chest pain or pressure. No palpitations.  No ankle swelling  Pulmonary: No SOB, orthopnea, PND  Gastrointestinal: No nausea, vomiting or diarrhea      Objective:      Visit Vitals  BP (!) 114/56 (BP 1 Location: Left upper arm, BP Patient Position: At rest)   Pulse 76   Temp 98.7 °F (37.1 °C)   Resp 20   Ht 5' 8\" (1.727 m)   Wt 79.1 kg (174 lb 6.4 oz)   SpO2 100%   BMI 26.52 kg/m²     Body mass index is 26.52 kg/m². Physical Exam:  General Appearance: Comfortable, eating his dinner  NECK: No JVD, no thyroidomeglay. LUNGS: Clear bilaterally. HEART: S1+S2 audible,    ABD: Non-tender, BS Audible    EXT: No edema, and no cysnosis. VASCULAR EXAM: Pulses are intact. PSYCHIATRIC EXAM: Mood is appropriate. Medication:  Current Facility-Administered Medications   Medication Dose Route Frequency    iron sucrose (VENOFER) 200 mg in 0.9% sodium chloride 100 mL IVPB  200 mg IntraVENous Q24H    pantoprazole (PROTONIX) tablet 40 mg  40 mg Oral ACB&D    sodium chloride (NS) flush 5-10 mL  5-10 mL IntraVENous PRN    albuterol-ipratropium (DUO-NEB) 2.5 MG-0.5 MG/3 ML  3 mL Nebulization Q4H PRN    [Held by provider] apixaban (ELIQUIS) tablet 5 mg  5 mg Oral BID    [Held by provider] furosemide (LASIX) tablet 20 mg  20 mg Oral DAILY    gabapentin (NEURONTIN) capsule 100 mg  100 mg Oral TID    tamsulosin (FLOMAX) capsule 0.4 mg  0.4 mg Oral QPM    traMADoL (ULTRAM) tablet 50 mg  50 mg Oral Q8H PRN    acetaminophen (TYLENOL) tablet 650 mg  650 mg Oral Q6H PRN    ondansetron (ZOFRAN) injection 4 mg  4 mg IntraVENous Q6H PRN    carboxymethylcellulose sodium (REFRESH PLUS) 0.5 % ophthalmic solution 2 Drop  2 Drop Both Eyes DAILY PRN    vancomycin (VANCOCIN) 1250 mg in  ml infusion  1,250 mg IntraVENous Q24H    Vancomycin Pharmacy To Dose  1 Each Other Rx Dosing/Monitoring    arformoteroL (BROVANA) neb solution 15 mcg  15 mcg Nebulization BID RT    budesonide (PULMICORT) 500 mcg/2 ml nebulizer suspension  500 mcg Nebulization BID RT    amLODIPine (NORVASC) tablet 10 mg  10 mg Oral DAILY               Lab/Data Reviewed:  Procedures/imaging: see electronic medical records for all procedures/Xrays   and details which were not copied into this note but were reviewed prior to creation of Plan       All lab results for the last 24 hours reviewed. Recent Labs     04/13/22  0101 04/12/22  0250 04/11/22  0515   WBC 7.2 7.6 7.4   HGB 7.3* 7.3* 7.2*   HCT 23.6* 23.5* 23.3*    293 261     Recent Labs     04/13/22  0101 04/12/22  0250 04/11/22  0515    139 139   K 4.3 4.0 3.9    104 104   CO2 30 32 30   GLU 96 97 98   BUN 37* 37* 37*   CREA 1.33* 1.28 1.53*   CA 7.0* 6.6* 6.3*       RADIOLOGY:  CT Results  (Last 48 hours)    None        CXR Results  (Last 48 hours)    None            Cardiology Procedures:   Results for orders placed or performed during the hospital encounter of 04/01/22   EKG, 12 LEAD, INITIAL   Result Value Ref Range    Ventricular Rate 96 BPM    Atrial Rate 96 BPM    P-R Interval 144 ms    QRS Duration 76 ms    Q-T Interval 364 ms    QTC Calculation (Bezet) 459 ms    Calculated P Axis 79 degrees    Calculated R Axis 23 degrees    Calculated T Axis 73 degrees    Diagnosis       Normal sinus rhythm  Normal ECG  When compared with ECG of 19-MAR-2022 19:16,  No significant change was found  Confirmed by Danisha Frost MD, -- ([id number]) on 4/2/2022 12:42:53 PM        Echo Results  (Last 48 hours)    None       Cardiolite (Tc-99m Sestamibi) stress test    Signed By: Claudeen January, MD     April 13, 2022

## 2022-04-13 NOTE — ROUTINE PROCESS
TRANSFER - OUT REPORT:    Verbal report given to [facility] RN(name) on Navi Sen  being transferred to [facility] (unit) for routine progression of care       Report consisted of patients Situation, Background, Assessment and   Recommendations(SBAR). Information from the following report(s) SBAR, Kardex, Procedure Summary, Intake/Output and Recent Results was reviewed with the receiving nurse. Lines:   Peripheral IV 04/07/22 (Active)   Site Assessment Clean, dry, & intact 04/08/22 0738   Phlebitis Assessment 0 04/08/22 0738   Infiltration Assessment 0 04/08/22 0738   Dressing Status Clean, dry, & intact 04/08/22 0738   Dressing Type Tape;Transparent 04/08/22 0738   Hub Color/Line Status Capped 04/08/22 0738   Action Taken Open ports on tubing capped 04/08/22 0738   Alcohol Cap Used Yes 04/08/22 0738       Peripheral IV 04/08/22 Right;Proximal Basilic (Active)   Site Assessment Clean, dry, & intact 04/13/22 1145   Phlebitis Assessment 0 04/13/22 1145   Infiltration Assessment 0 04/13/22 1145   Dressing Status Clean, dry, & intact 04/13/22 1145   Dressing Type Tape;Transparent 04/13/22 1145   Hub Color/Line Status Green 04/13/22 1145   Action Taken Open ports on tubing capped 04/13/22 1145   Alcohol Cap Used Yes 04/13/22 1145        Opportunity for questions and clarification was provided.       Patient transported with:   Monitor/ Tech

## 2022-04-13 NOTE — PROGRESS NOTES
Hospitalist Progress Note    Patient: Kaley Artis MRN: [id number]  CSN: [id number]    YOB: 1945  Age: 64 y.o.   Sex: male    DOA: 4/7/2022 LOS:  LOS: 6 days          Chief Complaint:    bacteremia      Assessment/Plan     MRSA bacteremia-on IV vanco   NATY today  picc line tomorrow  eliquis is on hold    Stage 3 CKD is stable and should not prevent picc line as he is at low likelihood of requiring dialysis nor being candidate for in future considering advanced medical disease     Chronic resp failure due to COPD on home 02     Chronic diastolic CHF     HTN    Hx of DVT-hold eliquis for anemia and procedures  Duodenitis  Duodenal ulcers-continue PPI, s/p EGD  Severe anemia, iron is low, venofer X 3 doses  Paroxysmal afib  Elevated Cr-daily BMP, stable      for help with outpatient abx  Consult PT     Daily labs ordered     D/c planning    Disposition :  Patient Active Problem List   Diagnosis Code    Hypertension I10    Chronic obstructive pulmonary disease ([facility]) J44.9    Chronic renal disease, stage 3, moderately decreased glomerular filtration rate between 30-59 mL/min/1.73 square meter ([facility]) N18.30    Age-related physical debility R54    Acute on chronic respiratory failure with hypoxemia ([facility]) J96.21    PAF (paroxysmal atrial fibrillation) ([facility]) I48.0    Avascular necrosis of bone of left hip ([facility]) M87.052    Anemia D64.9    COPD with acute exacerbation ([facility]) J44.1    Acute exacerbation of chronic obstructive pulmonary disease (COPD) ([facility]) J44.1    Loculated pleural effusion J90    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    Right lower lobe lung mass R91.8    Bilateral deafness H91.93    Acute deep vein thrombosis (DVT) of femoral vein of right lower extremity ([facility]) I82.411    Gastritis and duodenitis K29.90    BMI 30.0-30.9,adult Z68.30    On supplemental oxygen by nasal cannula Z78.9    Bladder wall thickening N32.89    MRSA bacteremia R78.81, B95.62       Subjective:  He denies any complaints  States he is doing fine  No questions on his procedure  Hopeful to go home soon      Review of systems:    Constitutional: denies fevers, chills  Respiratory: denies SOB, cough  Cardiovascular: denies chest pain  Gastrointestinal: denies nausea, vomiting, diarrhea      Vital signs/Intake and Output:  Visit Vitals  BP (!) 115/55 (BP 1 Location: Left upper arm, BP Patient Position: At rest)   Pulse 78   Temp 97.2 °F (36.2 °C)   Resp 20   Ht 5' 8\" (1.727 m)   Wt 79.1 kg (174 lb 6.4 oz)   SpO2 98%   BMI 26.52 kg/m²     Current Shift:  No intake/output data recorded. Last three shifts:  04/11 1901 - 04/13 0700  In: -   Out: 201 [Urine:200]    Exam:    General: elderly Wm, [place], alert, NAD, OX3  CVS:Regular rate and rhythm, no M/R/G, S1/S2 heard, no thrill  Lungs:Clear to auscultation bilaterally, no wheezes, rhonchi, or rales  Abdomen: Soft, Nontender, No distention, Normal Bowel sounds  Extremities: No C/C/E, pulses palpable 2+  Neuro:grossly normal , follows commands  Psych:appropriate                Labs: Results:       Chemistry Recent Labs     04/13/22 0101 04/12/22 0250 04/11/22  0515   GLU 96 97 98    139 139   K 4.3 4.0 3.9    104 104   CO2 30 32 30   BUN 37* 37* 37*   CREA 1.33* 1.28 1.53*   CA 7.0* 6.6* 6.3*   AGAP 4 3 5   BUCR 28* 29* 24*   AP  --   --  69   TP  --   --  4.6*   ALB  --   --  2.1*   GLOB  --   --  2.5   AGRAT  --   --  0.8      CBC w/Diff Recent Labs     04/13/22 0101 04/12/22 0250 04/11/22  0515   WBC 7.2 7.6 7.4   RBC 2.60* 2.60* 2.57*   HGB 7.3* 7.3* 7.2*   HCT 23.6* 23.5* 23.3*    293 261   GRANS 58 58 57   LYMPH 16* 15* 18*   EOS 10* 9* 9*      Cardiac Enzymes No results for input(s): CPK, CKND1, WILLARD in the last 72 hours.     No lab exists for component: CKRMB, TROIP   Coagulation Recent Labs     04/13/22  0101   PTP 13.4   INR 1.1       Lipid Panel Lab Results   Component Value Date/Time    Cholesterol, total 172 04/02/2022 03:23 AM    HDL Cholesterol 39 (L) 04/02/2022 03:23 AM    LDL, calculated 109.2 (H) 04/02/2022 03:23 AM    VLDL, calculated 23.8 04/02/2022 03:23 AM    Triglyceride 119 04/02/2022 03:23 AM    CHOL/HDL Ratio 4.4 04/02/2022 03:23 AM      BNP No results for input(s): BNPP in the last 72 hours.    Liver Enzymes Recent Labs     04/11/22  0515   TP 4.6*   ALB 2.1*   AP 69      Thyroid Studies Lab Results   Component Value Date/Time    TSH 3.06 04/05/2021 01:30 PM        Procedures/imaging: see electronic medical records for all procedures/Xrays and details which were not copied into this note but were reviewed prior to creation of Aime Winston MD

## 2022-04-13 NOTE — PROGRESS NOTES
PT order received and chart reviewed. Pt RANDALL for NATY today. Will continue to follow up and initiate PT evaluation when able.

## 2022-04-13 NOTE — ROUTINE PROCESS
Bedside shift change report given to CAITLIN Price (oncoming nurse) by Iva Bose RN   (offgoing nurse). Report included the following information SBAR, Kardex, Intake/Output and Recent Results.

## 2022-04-13 NOTE — ROUTINE PROCESS
Assumed care of patient from night shift nurse. Patient is resting comfortably at this time. He remains NPO for a NATY today. Patient is extremely upset about NPO status, calling the kitchen, and others, and complaining. Explained that he will have his procedure as soon as possible, but it will probably be at 1100.

## 2022-04-13 NOTE — PROGRESS NOTES
Assumed care of patient from night shift nurse. Patient requesting to eat. Advised that he is having a procedure this am, and that he cannot  Denies further needs.

## 2022-04-13 NOTE — PROGRESS NOTES
[facility]  (A Division of [address] [place] [address])      ID Follow Up Note      Date of Admission: 4/7/2022      Date of Note: 4/13/2022  Reason for FU: MRSA bacteremia      Current Antimicrobials:    Prior Antimicrobials:  1. Vanco IV 4/7 to date        Assessment: Rec / Plan:   Community onset high- grade MRSA bacteremia  BCX- 4/6- 3/4 + MRSA + CoNS- 1/4   BCX: 4/7- NGSF  BCX 4/8-NGSF  TTE--4/8-- no finding of IE on report  NATY 4/13-- No IE or abscess    CoNS bacteremia-- likely contaminant   DW Dr Alvino Chicas    ->Vanco #6--     Pharmacy to dose, monitor Cr closely for toxicity  Monitor Cr daily-->is getting better    PICC ordered  Plan for Vanco IV 1250mg daily X4 weeks. End: May 4      Weekly labs- cbc w/diff, bmp, LFT,  on Monday  Vanco troph- desired around 15, or -600- phramacy to dose    Monitor above labs for ABX adverse effects  Fax labs to Dr Jammie Pavon-- [phone]     CM to arrange the above      Gastritis  Not my monkey, but I imagine MRSA could come from any disrupted endothelium. IT would be odd. COPD  --+ MRSA in Epic 06/2020 and 08/2019  I'm speculating the MRSA came from previous multiple admissions for his COPD attacks    CKD Stage II  Better this admission with IVF support    Hard of Hearing  Shout into his L ear. Subjective:  Pt seen and examined   Had no complaints of  SOB/cough/F/C/R  DW Dr Alvino Chicas NATY finding    Notes and labs reviewed. Imaging reports reviewed- Cr is getting better. HPI:  CC:  \"My stomach has been hurting\"  Mr Cm Fernandes is a 63y WM with COPD/frequent admission here for that over last 3m who was called back yesterday after a visit to the ED 4/6 popped (+) MRSA (one set out of both arms); these grew within 18h apiece. He feels fine. No f/s/c. Tired/fatigued, but ascribes that to his lungs. Prior (+) MRSA infection wounds.     Retired          Microbiology:  4/8 - BCx sent  `   4/7 - BCx 2/2 (-)       4/6 - BCx 2/2 (+) MRSA with one set also growing CoNS      Lines / Catheters: peripheral    Active Hospital Problems    Diagnosis Date Noted    MRSA bacteremia 04/07/2022    On supplemental oxygen by nasal cannula 04/02/2022    Gastritis and duodenitis 04/02/2022    Bladder wall thickening 04/02/2022    Acute deep vein thrombosis (DVT) of femoral vein of right lower extremity ([address] 02/03/2022    Acute exacerbation of chronic obstructive pulmonary disease (COPD) ([address] 10/05/2021    PAF (paroxysmal atrial fibrillation) ([address] 04/05/2021    Acute on chronic respiratory failure with hypoxemia (HCC) 08/19/2019    Age-related physical debility 07/08/2019    Chronic renal disease, stage 3, moderately decreased glomerular filtration rate between 30-59 mL/min/1.73 square meter ([address] 07/20/2018    Chronic obstructive pulmonary disease ([address] 04/20/2018    Hypertension      Past Medical History:   Diagnosis Date    BMI 30.0-30.9,adult 4/2/2022    Brain aneurysm     Brain aneurysm     Cancer (HCC)     prostate    CHF (congestive heart failure) (HCC)     CKD (chronic kidney disease)     Closed nondisplaced supracondylar fracture of lower end of left femur without intracondylar extension with delayed healing     COPD (chronic obstructive pulmonary disease) ([address]     Debility     History of home oxygen therapy     [place] (hard of hearing)     Hypertension     Nocturia     On home oxygen therapy     PAF (paroxysmal atrial fibrillation) (HCC)     Pneumonia     Prostate CA ([address]     Prostate neoplasm     Radiation effect      Past Surgical History:   Procedure Laterality Date    HX HERNIA REPAIR      HX HIP ARTHROSCOPY  04/09/2021     Family History   Problem Relation Age of Onset    Heart Disease Mother      Social History     Socioeconomic History    Marital status:      Spouse name: Not on file    Number of children: Not on file    Years of education: Not on file    Highest education level: Not on file   Occupational History    Not on file   Tobacco Use    Smoking status: Former Smoker     Types: Cigarettes    Smokeless tobacco: Never Used   Substance and Sexual Activity    Alcohol use: No    Drug use: Never    Sexual activity: Not on file   Other Topics Concern    Not on file   Social History Narrative    Not on file     Social Determinants of Health     Financial Resource Strain:     Difficulty of Paying Living Expenses: Not on file   Food Insecurity:     Worried About Running Out of Food in the Last Year: Not on file    Amaya of Food in the Last Year: Not on file   Transportation Needs:     Lack of Transportation (Medical): Not on file    Lack of Transportation (Non-Medical):  Not on file   Physical Activity:     Days of Exercise per Week: Not on file    Minutes of Exercise per Session: Not on file   Stress:     Feeling of Stress : Not on file   Social Connections:     Frequency of Communication with Friends and Family: Not on file    Frequency of Social Gatherings with Friends and Family: Not on file    Attends Quaker Services: Not on file    Active Member of [address] [place] [address] or Organizations: Not on file    Attends Club or Organization Meetings: Not on file    Marital Status: Not on file   Intimate Partner Violence:     Fear of Current or Ex-Partner: Not on file    Emotionally Abused: Not on file    Physically Abused: Not on file    Sexually Abused: Not on file   Housing Stability:     Unable to Pay for Housing in the Last Year: Not on file    Number of [place] in the Last Year: Not on file    Unstable Housing in the Last Year: Not on file       Allergies:  Aspirin and Bactrim [sulfamethoxazole-trimethoprim]     Medications:  Current Facility-Administered Medications   Medication Dose Route Frequency    pantoprazole (PROTONIX) tablet 40 mg  40 mg Oral ACB&D    sodium chloride (NS) flush 5-10 mL  5-10 mL IntraVENous PRN    albuterol-ipratropium (DUO-NEB) 2.5 MG-0.5 MG/3 ML  3 mL Nebulization Q4H PRN    [Held by provider] apixaban (ELIQUIS) tablet 5 mg  5 mg Oral BID    [Held by provider] furosemide (LASIX) tablet 20 mg  20 mg Oral DAILY    gabapentin (NEURONTIN) capsule 100 mg  100 mg Oral TID    tamsulosin (FLOMAX) capsule 0.4 mg  0.4 mg Oral QPM    traMADoL (ULTRAM) tablet 50 mg  50 mg Oral Q8H PRN    acetaminophen (TYLENOL) tablet 650 mg  650 mg Oral Q6H PRN    ondansetron (ZOFRAN) injection 4 mg  4 mg IntraVENous Q6H PRN    carboxymethylcellulose sodium (REFRESH PLUS) 0.5 % ophthalmic solution 2 Drop  2 Drop Both Eyes DAILY PRN    vancomycin (VANCOCIN) 1250 mg in  ml infusion  1,250 mg IntraVENous Q24H    Vancomycin Pharmacy To Dose  1 Each Other Rx Dosing/Monitoring    arformoteroL (BROVANA) neb solution 15 mcg  15 mcg Nebulization BID RT    budesonide (PULMICORT) 500 mcg/2 ml nebulizer suspension  500 mcg Nebulization BID RT    amLODIPine (NORVASC) tablet 10 mg  10 mg Oral DAILY        ROS:  Constitutional: negative for fevers, chills and sweats  Respiratory: negative for cough  Cardiovascular: negative for chest pain, dyspnea  Gastrointestinal: negative for nausea, vomiting and diarrhea  Genitourinary:negative for dysuria     Physical Exam:    Temp (24hrs), Av.8 °F (36.6 °C), Min:97.2 °F (36.2 °C), Max:98.7 °F (37.1 °C)    Visit Vitals  /65 (BP 1 Location: Left upper arm, BP Patient Position: At rest)   Pulse 77   Temp 97.6 °F (36.4 °C)   Resp 18   Ht 5' 8\" (1.727 m)   Wt 78.9 kg (174 lb)   SpO2 100%   BMI 26.46 kg/m²       General: Well developed, well nourished 64 y.o.  WM in no acute distress.     Head: normocephalic, without obvious abnormality  Mouth:  mucous membranes moist, pharynx normal without lesions  Neck: supple, symmetrical, trachea midline   Cardio:  regular rate and rhythm, S1, S2 normal, no murmur, click, rub or gallop  Chest: inspection normal - no chest wall deformities or tenderness, respiratory effort normal  Lungs: clear to auscultation, no wheezes or rales and unlabored breathing  Abdomen: soft, non-tender. Bowel sounds normal  Extremities:  no redness or tenderness in the calves or thighs, no edema  Skin. No peripheral sign of IE. Dry and coarsened skin over hands  Neuro: awake, Alert X3. Hard of hearing- on right ear       Lab results:    Chemistry  Recent Labs     04/13/22  0101 04/12/22  0250 04/11/22  0515   GLU 96 97 98    139 139   K 4.3 4.0 3.9    104 104   CO2 30 32 30   BUN 37* 37* 37*   CREA 1.33* 1.28 1.53*   CA 7.0* 6.6* 6.3*   AGAP 4 3 5   BUCR 28* 29* 24*   AP  --   --  69   TP  --   --  4.6*   ALB  --   --  2.1*   GLOB  --   --  2.5   AGRAT  --   --  0.8       CBC w/ Diff  Recent Labs     04/13/22  0101 04/12/22  0250 04/11/22  0515   WBC 7.2 7.6 7.4   RBC 2.60* 2.60* 2.57*   HGB 7.3* 7.3* 7.2*   HCT 23.6* 23.5* 23.3*    293 261   GRANS 58 58 57   LYMPH 16* 15* 18*   EOS 10* 9* 9*       Microbiology  All Micro Results     Procedure Component Value Units Date/Time    CULTURE, BLOOD [[id number]] Collected: 04/07/22 1022    Order Status: Completed Specimen: Blood Updated: 04/13/22 1025     Special Requests: NO SPECIAL REQUESTS        Culture result: NO GROWTH 6 DAYS       CULTURE, BLOOD [[id number]] Collected: 04/07/22 1022    Order Status: Completed Specimen: Blood Updated: 04/13/22 1025     Special Requests: NO SPECIAL REQUESTS        Culture result: NO GROWTH 6 DAYS       CULTURE, BLOOD [[id number]] Collected: 04/08/22 0214    Order Status: Completed Specimen: Blood Updated: 04/13/22 1025     Special Requests: NO SPECIAL REQUESTS        Culture result: NO GROWTH 5 DAYS       ENTERIC BACTERIA PANEL, DNA [[id number]] Collected: 04/07/22 1030    Order Status: Canceled Specimen: Stool          Lillian Cabello MD  [facility])  Office #:     [phone]  [phone]   Office Fax: [phone]

## 2022-04-13 NOTE — PROGRESS NOTES
4/13/2022 PT note: consult received and chart reviewed. Evaluation attempted. Pt currently off unit at . Will f/u at later time as pt schedule allows for PT evaluation.  Thank you for this referral.   Allie Landon, PT

## 2022-04-14 ENCOUNTER — APPOINTMENT (OUTPATIENT)
Dept: INTERVENTIONAL RADIOLOGY/VASCULAR | Age: 77
DRG: 872 | End: 2022-04-14
Attending: HOSPITALIST
Payer: MEDICARE

## 2022-04-14 LAB
ANION GAP SERPL CALC-SCNC: 8 MMOL/L (ref 3–18)
BACTERIA SPEC CULT: NORMAL
BASOPHILS # BLD: 0 K/UL (ref 0–0.1)
BASOPHILS NFR BLD: 1 % (ref 0–2)
BUN SERPL-MCNC: 36 MG/DL (ref 7–18)
BUN/CREAT SERPL: 22 (ref 12–20)
CALCIUM SERPL-MCNC: 7.1 MG/DL (ref 8.5–10.1)
CHLORIDE SERPL-SCNC: 107 MMOL/L (ref 100–111)
CK SERPL-CCNC: 33 U/L (ref 39–308)
CO2 SERPL-SCNC: 26 MMOL/L (ref 21–32)
CREAT SERPL-MCNC: 1.64 MG/DL (ref 0.6–1.3)
DIFFERENTIAL METHOD BLD: ABNORMAL
EOSINOPHIL # BLD: 0.6 K/UL (ref 0–0.4)
EOSINOPHIL NFR BLD: 9 % (ref 0–5)
ERYTHROCYTE [DISTWIDTH] IN BLOOD BY AUTOMATED COUNT: 15.9 % (ref 11.6–14.5)
GLUCOSE SERPL-MCNC: 169 MG/DL (ref 74–99)
HCT VFR BLD AUTO: 22.8 % (ref 36–48)
HGB BLD-MCNC: 7 G/DL (ref 13–16)
IMM GRANULOCYTES # BLD AUTO: 0.2 K/UL (ref 0–0.04)
IMM GRANULOCYTES NFR BLD AUTO: 3 % (ref 0–0.5)
LYMPHOCYTES # BLD: 1.1 K/UL (ref 0.9–3.6)
LYMPHOCYTES NFR BLD: 15 % (ref 21–52)
MCH RBC QN AUTO: 28.3 PG (ref 24–34)
MCHC RBC AUTO-ENTMCNC: 30.7 G/DL (ref 31–37)
MCV RBC AUTO: 92.3 FL (ref 78–100)
MONOCYTES # BLD: 0.8 K/UL (ref 0.05–1.2)
MONOCYTES NFR BLD: 10 % (ref 3–10)
NEUTS SEG # BLD: 4.7 K/UL (ref 1.8–8)
NEUTS SEG NFR BLD: 64 % (ref 40–73)
NRBC # BLD: 0 K/UL (ref 0–0.01)
NRBC BLD-RTO: 0 PER 100 WBC
PLATELET # BLD AUTO: 272 K/UL (ref 135–420)
PMV BLD AUTO: 9.2 FL (ref 9.2–11.8)
POTASSIUM SERPL-SCNC: 3.8 MMOL/L (ref 3.5–5.5)
RBC # BLD AUTO: 2.47 M/UL (ref 4.35–5.65)
SERVICE CMNT-IMP: NORMAL
SODIUM SERPL-SCNC: 141 MMOL/L (ref 136–145)
VANCOMYCIN SERPL-MCNC: 22.4 UG/ML (ref 5–40)
WBC # BLD AUTO: 7.4 K/UL (ref 4.6–13.2)

## 2022-04-14 PROCEDURE — 74011000250 HC RX REV CODE- 250: Performed by: EMERGENCY MEDICINE

## 2022-04-14 PROCEDURE — 74011250637 HC RX REV CODE- 250/637: Performed by: HOSPITALIST

## 2022-04-14 PROCEDURE — 97116 GAIT TRAINING THERAPY: CPT

## 2022-04-14 PROCEDURE — B5181ZA FLUOROSCOPY OF SUPERIOR VENA CAVA USING LOW OSMOLAR CONTRAST, GUIDANCE: ICD-10-PCS | Performed by: RADIOLOGY

## 2022-04-14 PROCEDURE — 94640 AIRWAY INHALATION TREATMENT: CPT

## 2022-04-14 PROCEDURE — 82550 ASSAY OF CK (CPK): CPT

## 2022-04-14 PROCEDURE — 85025 COMPLETE CBC W/AUTO DIFF WBC: CPT

## 2022-04-14 PROCEDURE — 97161 PT EVAL LOW COMPLEX 20 MIN: CPT

## 2022-04-14 PROCEDURE — 80048 BASIC METABOLIC PNL TOTAL CA: CPT

## 2022-04-14 PROCEDURE — 74011000250 HC RX REV CODE- 250: Performed by: INTERNAL MEDICINE

## 2022-04-14 PROCEDURE — C1751 CATH, INF, PER/CENT/MIDLINE: HCPCS

## 2022-04-14 PROCEDURE — 65270000029 HC RM PRIVATE

## 2022-04-14 PROCEDURE — 74011250637 HC RX REV CODE- 250/637: Performed by: INTERNAL MEDICINE

## 2022-04-14 PROCEDURE — 02HV33Z INSERTION OF INFUSION DEVICE INTO SUPERIOR VENA CAVA, PERCUTANEOUS APPROACH: ICD-10-PCS | Performed by: RADIOLOGY

## 2022-04-14 PROCEDURE — 74011250636 HC RX REV CODE- 250/636: Performed by: HOSPITALIST

## 2022-04-14 PROCEDURE — 74011000250 HC RX REV CODE- 250: Performed by: HOSPITALIST

## 2022-04-14 PROCEDURE — 80202 ASSAY OF VANCOMYCIN: CPT

## 2022-04-14 PROCEDURE — 74011250636 HC RX REV CODE- 250/636: Performed by: INTERNAL MEDICINE

## 2022-04-14 PROCEDURE — 36415 COLL VENOUS BLD VENIPUNCTURE: CPT

## 2022-04-14 RX ORDER — HEPARIN SODIUM (PORCINE) LOCK FLUSH IV SOLN 100 UNIT/ML 100 UNIT/ML
500 SOLUTION INTRAVENOUS
Status: DISCONTINUED | OUTPATIENT
Start: 2022-04-14 | End: 2022-04-15 | Stop reason: HOSPADM

## 2022-04-14 RX ORDER — LIDOCAINE HYDROCHLORIDE 10 MG/ML
1-20 INJECTION INFILTRATION; PERINEURAL
Status: DISPENSED | OUTPATIENT
Start: 2022-04-14 | End: 2022-04-14

## 2022-04-14 RX ORDER — LIDOCAINE HYDROCHLORIDE 10 MG/ML
1-20 INJECTION INFILTRATION; PERINEURAL
Status: CANCELLED | OUTPATIENT
Start: 2022-04-14 | End: 2022-04-14

## 2022-04-14 RX ORDER — HEPARIN SODIUM 200 [USP'U]/100ML
500 INJECTION, SOLUTION INTRAVENOUS
Status: CANCELLED | OUTPATIENT
Start: 2022-04-14 | End: 2022-04-14

## 2022-04-14 RX ORDER — HEPARIN SODIUM 200 [USP'U]/100ML
500 INJECTION, SOLUTION INTRAVENOUS
Status: COMPLETED | OUTPATIENT
Start: 2022-04-14 | End: 2022-04-14

## 2022-04-14 RX ORDER — HEPARIN SODIUM (PORCINE) LOCK FLUSH IV SOLN 100 UNIT/ML 100 UNIT/ML
500 SOLUTION INTRAVENOUS
Status: CANCELLED | OUTPATIENT
Start: 2022-04-14

## 2022-04-14 RX ADMIN — ARFORMOTEROL TARTRATE 15 MCG: 15 SOLUTION RESPIRATORY (INHALATION) at 08:06

## 2022-04-14 RX ADMIN — GABAPENTIN 100 MG: 100 CAPSULE ORAL at 16:38

## 2022-04-14 RX ADMIN — SODIUM CHLORIDE, PRESERVATIVE FREE 10 ML: 5 INJECTION INTRAVENOUS at 14:23

## 2022-04-14 RX ADMIN — AMLODIPINE BESYLATE 10 MG: 5 TABLET ORAL at 08:14

## 2022-04-14 RX ADMIN — GABAPENTIN 100 MG: 100 CAPSULE ORAL at 22:08

## 2022-04-14 RX ADMIN — GABAPENTIN 100 MG: 100 CAPSULE ORAL at 08:14

## 2022-04-14 RX ADMIN — PANTOPRAZOLE SODIUM 40 MG: 40 TABLET, DELAYED RELEASE ORAL at 16:38

## 2022-04-14 RX ADMIN — DAPTOMYCIN 450 MG: 500 INJECTION, POWDER, LYOPHILIZED, FOR SOLUTION INTRAVENOUS at 14:23

## 2022-04-14 RX ADMIN — IPRATROPIUM BROMIDE AND ALBUTEROL SULFATE 3 ML: .5; 3 SOLUTION RESPIRATORY (INHALATION) at 17:15

## 2022-04-14 RX ADMIN — APIXABAN 5 MG: 5 TABLET, FILM COATED ORAL at 22:08

## 2022-04-14 RX ADMIN — BUDESONIDE 500 MCG: 0.5 INHALANT RESPIRATORY (INHALATION) at 21:03

## 2022-04-14 RX ADMIN — SODIUM CHLORIDE 500 ML: 900 INJECTION, SOLUTION INTRAVENOUS at 08:16

## 2022-04-14 RX ADMIN — BUDESONIDE 500 MCG: 0.5 INHALANT RESPIRATORY (INHALATION) at 08:06

## 2022-04-14 RX ADMIN — ARFORMOTEROL TARTRATE 15 MCG: 15 SOLUTION RESPIRATORY (INHALATION) at 21:03

## 2022-04-14 RX ADMIN — HEPARIN SODIUM IN SODIUM CHLORIDE 1000 UNITS: 200 INJECTION INTRAVENOUS at 09:46

## 2022-04-14 RX ADMIN — PANTOPRAZOLE SODIUM 40 MG: 40 TABLET, DELAYED RELEASE ORAL at 08:14

## 2022-04-14 RX ADMIN — TAMSULOSIN HYDROCHLORIDE 0.4 MG: 0.4 CAPSULE ORAL at 17:51

## 2022-04-14 NOTE — PROGRESS NOTES
Hospitalist Progress Note    Patient: Buddy Cardona MRN: [id number]  CSN: [id number]    YOB: 1945  Age: 64 y.o. Sex: male    DOA: 4/7/2022 LOS:  LOS: 7 days          Chief Complaint:    bacteremia      Assessment/Plan     MRSA bacteremia-on IV vanco   NATY was unrevealing for endocarditis  picc line placed today  eliquis can resume     Stage 3 CKD-stable    Chronic resp failure due to COPD on home 02     Chronic diastolic CHF-compensated     HTN     Hx of DVT  Duodenitis  Duodenal ulcers-continue PPI, s/p EGD  Severe anemia, iron is low, venofer X 3 doses done  Paroxysmal afib  Elevated Cr-daily BMP, stable      for help with outpatient abx    Consult PT     Fluid bolus done    Monitor H&H and Cr one more day  Set up his outpatient IV abx for d/c     D/c planning      Disposition :  Patient Active Problem List   Diagnosis Code    Hypertension I10    Chronic obstructive pulmonary disease ([facility]) J44.9    Chronic renal disease, stage 3, moderately decreased glomerular filtration rate between 30-59 mL/min/1.73 square meter ([facility]) N18.30    Age-related physical debility R54    Acute on chronic respiratory failure with hypoxemia ([facility]) J96.21    PAF (paroxysmal atrial fibrillation) ([facility]) I48.0    Avascular necrosis of bone of left hip ([facility]) M87.052    Anemia D64.9    COPD with acute exacerbation ([facility]) J44.1    Acute exacerbation of chronic obstructive pulmonary disease (COPD) ([facility]) J44.1    Loculated pleural effusion J90    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     Review of systems:    Constitutional: denies fevers, chills  Respiratory: denies SOB  Cardiovascular: denies chest pain  Gastrointestinal: denies nausea, vomiting, diarrhea      Vital signs/Intake and Output:  Visit Vitals  BP (!) 111/37 (BP 1 Location: Left upper arm, BP Patient Position: At rest)   Pulse 76   Temp 98.6 °F (37 °C)   Resp 18   Ht 5' 8\" (1.727 m)   Wt 78.9 kg (174 lb)   SpO2 98%   BMI 26.46 kg/m²     Current Shift:  No intake/output data recorded. Last three shifts:  04/12 1901 - 04/14 0700  In: 300 [I.V.:300]  Out: 550 [Urine:550]    Exam:    General: [place] elderly WM, alert, NAD, OX3  CVS:Regular rate and rhythm, no M/R/G, S1/S2 heard, no thrill  Lungs:Clear to auscultation bilaterally, no wheezes, rhonchi, or rales  Abdomen: Soft, Nontender, No distention, Normal Bowel sounds  Extremities: No C/C/E, pulses palpable 2+  Neuro:grossly normal , follows commands  Psych:appropriate                Labs: Results:       Chemistry Recent Labs     04/14/22 0328 04/13/22 0101 04/12/22  0250   * 96 97    140 139   K 3.8 4.3 4.0    106 104   CO2 26 30 32   BUN 36* 37* 37*   CREA 1.64* 1.33* 1.28   CA 7.1* 7.0* 6.6*   AGAP 8 4 3   BUCR 22* 28* 29*      CBC w/Diff Recent Labs     04/14/22 0328 04/13/22 0101 04/12/22  0250   WBC 7.4 7.2 7.6   RBC 2.47* 2.60* 2.60*   HGB 7.0* 7.3* 7.3*   HCT 22.8* 23.6* 23.5*    278 293   GRANS 64 58 58   LYMPH 15* 16* 15*   EOS 9* 10* 9*      Cardiac Enzymes No results for input(s): CPK, CKND1, WILLARD in the last 72 hours.     No lab exists for component: CKRMB, TROIP   Coagulation Recent Labs     04/13/22  0101   PTP 13.4   INR 1.1       Lipid Panel Lab Results   Component Value Date/Time    Cholesterol, total 172 04/02/2022 03:23 AM    HDL Cholesterol 39 (L) 04/02/2022 03:23 AM    LDL, calculated 109.2 (H) 04/02/2022 03:23 AM    VLDL, calculated 23.8 04/02/2022 03:23 AM    Triglyceride 119 04/02/2022 03:23 AM    CHOL/HDL Ratio 4.4 04/02/2022 03:23 AM      BNP No results for input(s): BNPP in the last 72 hours. Liver Enzymes No results for input(s): TP, ALB, TBIL, AP in the last 72 hours.     No lab exists for component: SGOT, GPT, DBIL   Thyroid Studies Lab Results   Component Value Date/Time    TSH 3.06 04/05/2021 01:30 PM        Procedures/imaging: see electronic medical records for all procedures/Xrays and details which were not copied into this note but were reviewed prior to creation of Domenico Swan MD

## 2022-04-14 NOTE — ROUTINE PROCESS
Accompanied patient with [facility] to [facility] OF IRA line insertion. Remained with patient through procedure& returned patient to room 319 w/Transport post procedure.

## 2022-04-14 NOTE — PROGRESS NOTES
[facility]  (A Division of [facility])      ID Follow Up Note      Date of Admission: 4/7/2022      Date of Note: 4/14/2022  Reason for FU: MRSA bacteremia      Current Antimicrobials:    Prior Antimicrobials:  1. Vanco IV 4/7 to date        Assessment: Rec / Plan:   Community onset high- grade MRSA bacteremia  BCX- 4/6- 3/4 + MRSA + CoNS- 1/4   BCX: 4/7- NGSF  BCX 4/8-NGSF  TTE--4/8-- no finding of IE on report  NATY 4/13-- No IE or abscess      CKD Stage -- worse   -- Cr bump to 1.6-- suspect vanco related [address]-- unstable dosing, CrCL is affected    Daptomycin 6mg/kg- daily-> 450 mg IV until May 4    -check cpk, recheck bmp in am    Weekly labs- cbc w/diff, bmp, CPK on Monday     Monitor above labs for ABX adverse effects  Fax labs to Dr Charis Tyler-- [phone]      Gastritis  Not my monkey, but I imagine MRSA could come from any disrupted endothelium. IT would be odd. DW RN and CM       COPD  --+ MRSA in Epic 06/2020 and 08/2019  I'm speculating the MRSA came from previous multiple admissions for his COPD attacks    t    Hard of Hearing  Shout into his L ear. Subjective:  Pt seen and examined - moves about room on his own  Had piCC placed today  No complaints  --his Cr is bumped up today    Notes and labs reviewed. Imaging reports reviewed- Cr is getting better. HPI:  CC:  \"My stomach has been hurting\"  Mr Fady Garcia is a 63y WM with COPD/frequent admission here for that over last 3m who was called back yesterday after a visit to the ED 4/6 popped (+) MRSA (one set out of both arms); these grew within 18h apiece. He feels fine. No f/s/c. Tired/fatigued, but ascribes that to his lungs. Prior (+) MRSA infection wounds.     Retired          Microbiology:  4/8 - BCx sent  `   4/7 - BCx 2/2 (-)       4/6 - BCx 2/2 (+) MRSA with one set also growing CoNS      Lines / Catheters: peripheral    Active Hospital Problems Diagnosis Date Noted    MRSA bacteremia 04/07/2022    On supplemental oxygen by nasal cannula 04/02/2022    Gastritis and duodenitis 04/02/2022    Bladder wall thickening 04/02/2022    Acute deep vein thrombosis (DVT) of femoral vein of right lower extremity ([address] 02/03/2022    Acute exacerbation of chronic obstructive pulmonary disease (COPD) ([address] 10/05/2021    PAF (paroxysmal atrial fibrillation) ([address] 04/05/2021    Acute on chronic respiratory failure with hypoxemia ([address] 08/19/2019    Age-related physical debility 07/08/2019    Chronic renal disease, stage 3, moderately decreased glomerular filtration rate between 30-59 mL/min/1.73 square meter ([address] 07/20/2018    Chronic obstructive pulmonary disease ([address] 04/20/2018    Hypertension      Past Medical History:   Diagnosis Date    BMI 30.0-30.9,adult 4/2/2022    Brain aneurysm     Brain aneurysm     Cancer [facility])     prostate    CHF (congestive heart failure) (HCC)     CKD (chronic kidney disease)     Closed nondisplaced supracondylar fracture of lower end of left femur without intracondylar extension with delayed healing     COPD (chronic obstructive pulmonary disease) ([address]     Debility     History of home oxygen therapy     [place] (hard of hearing)     Hypertension     Nocturia     On home oxygen therapy     PAF (paroxysmal atrial fibrillation) ([address]     Pneumonia     Prostate CA ([address]     Prostate neoplasm     Radiation effect      Past Surgical History:   Procedure Laterality Date    HX HERNIA REPAIR      HX HIP ARTHROSCOPY  04/09/2021     Family History   Problem Relation Age of Onset    Heart Disease Mother      Social History     Socioeconomic History    Marital status:      Spouse name: Not on file    Number of children: Not on file    Years of education: Not on file    Highest education level: Not on file   Occupational History    Not on file   Tobacco Use    Smoking status: Former Smoker     Types: Cigarettes  Smokeless tobacco: Never Used   Substance and Sexual Activity    Alcohol use: No    Drug use: Never    Sexual activity: Not on file   Other Topics Concern    Not on file   Social History Narrative    Not on file     Social Determinants of Health     Financial Resource Strain:     Difficulty of Paying Living Expenses: Not on file   Food Insecurity:     Worried About Running Out of Food in the Last Year: Not on file    Amaya of Food in the Last Year: Not on file   Transportation Needs:     Lack of Transportation (Medical): Not on file    Lack of Transportation (Non-Medical):  Not on file   Physical Activity:     Days of Exercise per Week: Not on file    Minutes of Exercise per Session: Not on file   Stress:     Feeling of Stress : Not on file   Social Connections:     Frequency of Communication with Friends and Family: Not on file    Frequency of Social Gatherings with Friends and Family: Not on file    Attends Islam Services: Not on file    Active Member of [address] [place] [address] or Organizations: Not on file    Attends Club or Organization Meetings: Not on file    Marital Status: Not on file   Intimate Partner Violence:     Fear of Current or Ex-Partner: Not on file    Emotionally Abused: Not on file    Physically Abused: Not on file    Sexually Abused: Not on file   Housing Stability:     Unable to Pay for Housing in the Last Year: Not on file    Number of [place] in the Last Year: Not on file    Unstable Housing in the Last Year: Not on file       Allergies:  Aspirin and Bactrim [sulfamethoxazole-trimethoprim]     Medications:  Current Facility-Administered Medications   Medication Dose Route Frequency    heparin (porcine) 100 unit/mL injection 500 Units  500 Units InterCATHeter Q8H PRN    lidocaine (XYLOCAINE) 10 mg/mL (1 %) injection 1-20 mL  1-20 mL SubCUTAneous RAD ONCE    Vancomycin Random level due 4/14/22 at 14:00  1 Each Other ONCE    pantoprazole (PROTONIX) tablet 40 mg  40 mg Oral ACB&D    sodium chloride (NS) flush 5-10 mL  5-10 mL IntraVENous PRN    albuterol-ipratropium (DUO-NEB) 2.5 MG-0.5 MG/3 ML  3 mL Nebulization Q4H PRN    apixaban (ELIQUIS) tablet 5 mg  5 mg Oral BID    gabapentin (NEURONTIN) capsule 100 mg  100 mg Oral TID    tamsulosin (FLOMAX) capsule 0.4 mg  0.4 mg Oral QPM    traMADoL (ULTRAM) tablet 50 mg  50 mg Oral Q8H PRN    acetaminophen (TYLENOL) tablet 650 mg  650 mg Oral Q6H PRN    ondansetron (ZOFRAN) injection 4 mg  4 mg IntraVENous Q6H PRN    carboxymethylcellulose sodium (REFRESH PLUS) 0.5 % ophthalmic solution 2 Drop  2 Drop Both Eyes DAILY PRN    Vancomycin Pharmacy To Dose  1 Each Other Rx Dosing/Monitoring    arformoteroL (BROVANA) neb solution 15 mcg  15 mcg Nebulization BID RT    budesonide (PULMICORT) 500 mcg/2 ml nebulizer suspension  500 mcg Nebulization BID RT    amLODIPine (NORVASC) tablet 10 mg  10 mg Oral DAILY        ROS:  Constitutional: negative for fevers, chills and sweats  Respiratory: negative for cough  Cardiovascular: negative for chest pain, dyspnea  Gastrointestinal: negative for nausea, vomiting and diarrhea  Genitourinary:negative for dysuria     Physical Exam:    Temp (24hrs), Av °F (36.7 °C), Min:97.5 °F (36.4 °C), Max:98.7 °F (37.1 °C)    Visit Vitals  BP (!) 116/38 (BP 1 Location: Right upper arm, BP Patient Position: Sitting)   Pulse 93   Temp 97.7 °F (36.5 °C)   Resp 17   Ht 5' 8\" (1.727 m)   Wt 78.9 kg (174 lb)   SpO2 100%   BMI 26.46 kg/m²       General: Well developed, well nourished 64 y.o.  WM in no acute distress. Head: normocephalic, without obvious abnormality    Cardio:  regular rate and rhythm, S1, S2 normal, no murmur, click, rub or gallop  Chest: inspection normal - no chest wall deformities or tenderness, respiratory effort normal  Lungs: clear to auscultation, no wheezes or rales and unlabored breathing  Abdomen: soft, non-tender.  Bowel sounds normal  Extremities:  no redness or tenderness in the calves or thighs, no edema  Skin. No peripheral sign of IE. Dry and coarsened skin over hands  Neuro: awake, Alert X3. Hard of hearing- on right ear       Lab results:    Chemistry  Recent Labs     04/14/22 0328 04/13/22  0101 04/12/22  0250   * 96 97    140 139   K 3.8 4.3 4.0    106 104   CO2 26 30 32   BUN 36* 37* 37*   CREA 1.64* 1.33* 1.28   CA 7.1* 7.0* 6.6*   AGAP 8 4 3   BUCR 22* 28* 29*       CBC w/ Diff  Recent Labs     04/14/22 0328 04/13/22  0101 04/12/22  0250   WBC 7.4 7.2 7.6   RBC 2.47* 2.60* 2.60*   HGB 7.0* 7.3* 7.3*   HCT 22.8* 23.6* 23.5*    278 293   GRANS 64 58 58   LYMPH 15* 16* 15*   EOS 9* 10* 9*       Microbiology  All Micro Results     Procedure Component Value Units Date/Time    CULTURE, BLOOD [[id number]] Collected: 04/08/22 0214    Order Status: Completed Specimen: Blood Updated: 04/14/22 0747     Special Requests: NO SPECIAL REQUESTS        Culture result: NO GROWTH 6 DAYS       CULTURE, BLOOD [[id number]] Collected: 04/07/22 1022    Order Status: Completed Specimen: Blood Updated: 04/13/22 1025     Special Requests: NO SPECIAL REQUESTS        Culture result: NO GROWTH 6 DAYS       CULTURE, BLOOD [[id number]] Collected: 04/07/22 1022    Order Status: Completed Specimen: Blood Updated: 04/13/22 1025     Special Requests: NO SPECIAL REQUESTS        Culture result: NO GROWTH 6 DAYS       ENTERIC BACTERIA PANEL, DNA [[id number]] Collected: 04/07/22 1030    Order Status: Canceled Specimen: Stool          Lillian Medrano MD  [facility])  Office #:     [phone]   Office Fax: [phone]

## 2022-04-14 NOTE — PROGRESS NOTES
D/C Plan: Prasanna Palencia  with [facility] and physician follow up     CM contacted pt's wife, Quintin Lu ([phone]), [place] discuss transition of care. CM notified family that pt will have a cost of approximately $90.00 per week with a total cost of approximately $230.00. Family is aware and in agreement with this cost.  Anticipate pt will transition home tomorrow with the above services. Pt's wife is aware and in agreement. CM to continue to follow and assist.    Care Management Interventions  Mode of Transport at Discharge: Other (see comment) (Family)  Transition of Care Consult (CM Consult): Discharge [address] Freddie: Yes  Health Maintenance Reviewed: Yes  Support Systems: Spouse/Significant Other,Child(prashant)  Confirm Follow Up Transport: Family  The Plan for Transition of Care is Related to the Following Treatment Goals : D/C Plan:  [address] with [facility] [facility] and physician follow up vs SNF  The Patient and/or Patient Representative was Provided with a Choice of Provider and Agrees with the Discharge Plan?: Yes  Name of the Patient Representative Who was Provided with a Choice of Provider and Agrees with the Discharge Plan: spouse  Freedom of Choice List was Provided with Basic Dialogue that Supports the Patient's Individualized Plan of Care/Goals, Treatment Preferences and Shares the Quality Data Associated with the Providers?: Yes  Discharge Location  Patient Expects to be Discharged to[de-identified] Room and board

## 2022-04-14 NOTE — PROGRESS NOTES
Vancomycin has been discontinued by Dr. Lucy Pena. Therapy has been adjusted to Daptomycin  Vancomycin consult closed. _________________________________________    Pharmacy Pharmacokinetic Monitoring Service: Vancomycin     Pt has been receiving Vancomycin for a Bloodstream Infection. Scr = 1.64  (increased from 1.33 on 4/13/22)     CrCl = 35.9 ml/min     WBC = 7.4    Tmax = 98.7  Day of therapy: 8  Most recent dosing:  Vancomycin 1250 mg IV q24h  Ordered Vancomycin Random level for 4/14/22 at 14:00 to assess level and dose.      Martha Penn, NithyaD

## 2022-04-14 NOTE — PROGRESS NOTES
Problem: Falls - Risk of  Goal: *Absence of Falls  Description: Document Yesi Garcia Fall Risk and appropriate interventions in the flowsheet.   Outcome: Progressing Towards Goal  Note: Fall Risk Interventions:  Mobility Interventions: Bed/chair exit alarm,Patient to call before getting OOB         Medication Interventions: Patient to call before getting OOB    Elimination Interventions: Call light in reach

## 2022-04-14 NOTE — PROGRESS NOTES
Nutrition Assessment     Type and Reason for Visit: Reassess    Nutrition Recommendations/Plan: Continue with oral diet and ensure enlive to meet nutritional needs. Nutrition Assessment:  PMHx: COPD, Brain aneurysm, CKD, oxygen dependence, HTN, Paroxysmal AFib, Prostate cancer, CHF. Admitted for MRSA bacteremia, Duodenitis, anemia. S/p EGD. S/p NATY. Malnutrition Assessment:  Malnutrition Status: Moderate malnutrition     Estimated Daily Nutrient Needs:  Energy (kcal):  8179-2254  Protein (g):  79       Fluid (ml/day):  5339-4429    Nutrition Related Findings:  lab: GFR est nonAA 41, creatinine 1.64, BUN 36, glucose 169. Med: flomax, protonix. +BM 4/13. Continues with good PO of %. Current Nutrition Therapies:  ADULT ORAL NUTRITION SUPPLEMENT Breakfast, Lunch, HS Snack; Standard High Calorie/High Protein  ADULT DIET Regular; 5 carb choices (75 gm/meal);  Low Sodium (2 gm)    Anthropometric Measures:  · Height:  5' 8\" (172.7 cm)  · Current Body Wt:  78.9 kg (173 lb 15.1 oz) (4/13/22)  · BMI: 26.5    Nutrition Diagnosis:   · Moderate malnutrition related to acute injury/trauma as evidenced by mild loss of subcutaneous fat,mild muscle loss    Nutrition Intervention:  Food and/or Nutrient Delivery: Continue current diet,Continue oral nutrition supplement  Nutrition Education and Counseling: No recommendations at this time  Coordination of Nutrition Care: Continue to monitor while inpatient    Goals:  Continue PO >75% of nutritional needs throughout the next 5-7 days       Nutrition Monitoring and Evaluation:   Behavioral-Environmental Outcomes: None identified  Food/Nutrient Intake Outcomes: Food and nutrient intake,Supplement intake  Physical Signs/Symptoms Outcomes: Biochemical data,Meal time behavior,Nutrition focused physical findings,Skin,Weight    Discharge Planning:    Continue current diet,Continue oral nutrition supplement     Electronically signed by Lizett Youngblood RD on 4/14/2022 at 11:08 AM

## 2022-04-14 NOTE — PROGRESS NOTES
4/14/2022 PT note: consult received and chart reviewed. Evaluation completed. Pt demonstrates independence in functional mobility, including transfers and gait with RW ~90ft in room. Verbal cues to slow speed for increased safety during turn negotiation; decr'd activity tolerance for further distance d/t h/o COPD. Otherwise, pt functioning at baseline and requires no further skilled physical therapy intervention at this time. Note pt very [place] and written communication used this session. Will acknowledge and discontinue orders at this time, with full report to follow. Nurse Salmon made aware of above.   Thank you for this referral.   Kris Carreon, PT

## 2022-04-15 ENCOUNTER — HOME HEALTH ADMISSION (OUTPATIENT)
Dept: HOME HEALTH SERVICES | Facility: HOME HEALTH | Age: 77
End: 2022-04-15
Payer: MEDICARE

## 2022-04-15 VITALS
DIASTOLIC BLOOD PRESSURE: 61 MMHG | SYSTOLIC BLOOD PRESSURE: 128 MMHG | BODY MASS INDEX: 26.76 KG/M2 | RESPIRATION RATE: 22 BRPM | HEART RATE: 86 BPM | HEIGHT: 68 IN | OXYGEN SATURATION: 99 % | WEIGHT: 176.6 LBS | TEMPERATURE: 97.4 F

## 2022-04-15 LAB
ANION GAP SERPL CALC-SCNC: 7 MMOL/L (ref 3–18)
BASOPHILS # BLD: 0.1 K/UL (ref 0–0.1)
BASOPHILS NFR BLD: 1 % (ref 0–2)
BUN SERPL-MCNC: 40 MG/DL (ref 7–18)
BUN/CREAT SERPL: 28 (ref 12–20)
CALCIUM SERPL-MCNC: 7.4 MG/DL (ref 8.5–10.1)
CHLORIDE SERPL-SCNC: 108 MMOL/L (ref 100–111)
CO2 SERPL-SCNC: 26 MMOL/L (ref 21–32)
CREAT SERPL-MCNC: 1.45 MG/DL (ref 0.6–1.3)
DIFFERENTIAL METHOD BLD: ABNORMAL
EOSINOPHIL # BLD: 0.6 K/UL (ref 0–0.4)
EOSINOPHIL NFR BLD: 9 % (ref 0–5)
ERYTHROCYTE [DISTWIDTH] IN BLOOD BY AUTOMATED COUNT: 16.2 % (ref 11.6–14.5)
GLUCOSE SERPL-MCNC: 116 MG/DL (ref 74–99)
HCT VFR BLD AUTO: 22.9 % (ref 36–48)
HGB BLD-MCNC: 7 G/DL (ref 13–16)
IMM GRANULOCYTES # BLD AUTO: 0.3 K/UL (ref 0–0.04)
IMM GRANULOCYTES NFR BLD AUTO: 5 % (ref 0–0.5)
LYMPHOCYTES # BLD: 1.1 K/UL (ref 0.9–3.6)
LYMPHOCYTES NFR BLD: 16 % (ref 21–52)
MCH RBC QN AUTO: 28 PG (ref 24–34)
MCHC RBC AUTO-ENTMCNC: 30.6 G/DL (ref 31–37)
MCV RBC AUTO: 91.6 FL (ref 78–100)
MONOCYTES # BLD: 0.8 K/UL (ref 0.05–1.2)
MONOCYTES NFR BLD: 11 % (ref 3–10)
NEUTS SEG # BLD: 4.2 K/UL (ref 1.8–8)
NEUTS SEG NFR BLD: 59 % (ref 40–73)
NRBC # BLD: 0 K/UL (ref 0–0.01)
NRBC BLD-RTO: 0 PER 100 WBC
PLATELET # BLD AUTO: 271 K/UL (ref 135–420)
PMV BLD AUTO: 9.2 FL (ref 9.2–11.8)
POTASSIUM SERPL-SCNC: 3.7 MMOL/L (ref 3.5–5.5)
RBC # BLD AUTO: 2.5 M/UL (ref 4.35–5.65)
SODIUM SERPL-SCNC: 141 MMOL/L (ref 136–145)
WBC # BLD AUTO: 7.1 K/UL (ref 4.6–13.2)

## 2022-04-15 PROCEDURE — 80048 BASIC METABOLIC PNL TOTAL CA: CPT

## 2022-04-15 PROCEDURE — 85025 COMPLETE CBC W/AUTO DIFF WBC: CPT

## 2022-04-15 PROCEDURE — 74011250637 HC RX REV CODE- 250/637: Performed by: INTERNAL MEDICINE

## 2022-04-15 PROCEDURE — 94640 AIRWAY INHALATION TREATMENT: CPT

## 2022-04-15 PROCEDURE — 77010033678 HC OXYGEN DAILY

## 2022-04-15 PROCEDURE — 74011000250 HC RX REV CODE- 250: Performed by: INTERNAL MEDICINE

## 2022-04-15 PROCEDURE — 74011000250 HC RX REV CODE- 250: Performed by: HOSPITALIST

## 2022-04-15 PROCEDURE — 74011250637 HC RX REV CODE- 250/637: Performed by: HOSPITALIST

## 2022-04-15 PROCEDURE — 36415 COLL VENOUS BLD VENIPUNCTURE: CPT

## 2022-04-15 PROCEDURE — 74011250636 HC RX REV CODE- 250/636: Performed by: INTERNAL MEDICINE

## 2022-04-15 RX ORDER — BUDESONIDE 0.5 MG/2ML
500 INHALANT ORAL 2 TIMES DAILY
Qty: 60 EACH | Refills: 3 | Status: SHIPPED | OUTPATIENT
Start: 2022-04-15 | End: 2022-05-15

## 2022-04-15 RX ORDER — LANOLIN ALCOHOL/MO/W.PET/CERES
325 CREAM (GRAM) TOPICAL 2 TIMES DAILY WITH MEALS
Qty: 60 TABLET | Refills: 3 | Status: SHIPPED | OUTPATIENT
Start: 2022-04-15

## 2022-04-15 RX ORDER — PANTOPRAZOLE SODIUM 40 MG/1
40 TABLET, DELAYED RELEASE ORAL
Qty: 60 TABLET | Refills: 0 | Status: SHIPPED | OUTPATIENT
Start: 2022-04-15

## 2022-04-15 RX ORDER — ALBUTEROL SULFATE 90 UG/1
2 AEROSOL, METERED RESPIRATORY (INHALATION)
Qty: 1 EACH | Refills: 3 | Status: SHIPPED | OUTPATIENT
Start: 2022-04-15 | End: 2022-05-13 | Stop reason: SDUPTHER

## 2022-04-15 RX ORDER — TAMSULOSIN HYDROCHLORIDE 0.4 MG/1
0.4 CAPSULE ORAL EVERY EVENING
Qty: 30 CAPSULE | Refills: 3 | Status: SHIPPED | OUTPATIENT
Start: 2022-04-15

## 2022-04-15 RX ORDER — AMLODIPINE BESYLATE 10 MG/1
10 TABLET ORAL DAILY
Qty: 30 TABLET | Refills: 3 | Status: SHIPPED | OUTPATIENT
Start: 2022-04-16

## 2022-04-15 RX ORDER — IPRATROPIUM BROMIDE AND ALBUTEROL SULFATE 2.5; .5 MG/3ML; MG/3ML
3 SOLUTION RESPIRATORY (INHALATION)
Qty: 30 NEBULE | Refills: 3 | Status: SHIPPED | OUTPATIENT
Start: 2022-04-15 | End: 2022-10-14 | Stop reason: SDUPTHER

## 2022-04-15 RX ORDER — ARFORMOTEROL TARTRATE 15 UG/2ML
15 SOLUTION RESPIRATORY (INHALATION) 2 TIMES DAILY
Qty: 120 ML | Refills: 3 | Status: SHIPPED | OUTPATIENT
Start: 2022-04-15 | End: 2022-05-15

## 2022-04-15 RX ORDER — FUROSEMIDE 20 MG/1
20 TABLET ORAL DAILY
Qty: 30 TABLET | Refills: 3 | Status: SHIPPED | OUTPATIENT
Start: 2022-04-15

## 2022-04-15 RX ADMIN — DAPTOMYCIN 450 MG: 500 INJECTION, POWDER, LYOPHILIZED, FOR SOLUTION INTRAVENOUS at 14:50

## 2022-04-15 RX ADMIN — PANTOPRAZOLE SODIUM 40 MG: 40 TABLET, DELAYED RELEASE ORAL at 06:35

## 2022-04-15 RX ADMIN — AMLODIPINE BESYLATE 10 MG: 5 TABLET ORAL at 08:19

## 2022-04-15 RX ADMIN — BUDESONIDE 500 MCG: 0.5 INHALANT RESPIRATORY (INHALATION) at 08:42

## 2022-04-15 RX ADMIN — GABAPENTIN 100 MG: 100 CAPSULE ORAL at 08:20

## 2022-04-15 RX ADMIN — APIXABAN 5 MG: 5 TABLET, FILM COATED ORAL at 08:19

## 2022-04-15 RX ADMIN — ARFORMOTEROL TARTRATE 15 MCG: 15 SOLUTION RESPIRATORY (INHALATION) at 08:42

## 2022-04-15 NOTE — PROGRESS NOTES
Physician Progress Note      Alice Borges  CSN #:                  [id number]  :                       1945  ADMIT DATE:       2022 10:12 AM  DISCH DATE:  RESPONDING  PROVIDER #:        Eudora Dakin MD          QUERY TEXT:    Pt admitted with fluid overload and CHF. Noted documentation of moderate malnutrition on  by ordered consultant. If possible, please document in progress notes and discharge summary:    The medical record reflects the following:    Risk Factors: CHF, NPO since , CKD 3    Clinical Indicators:  > Per RD  Context of Malnutrition Chronic illness  Energy Intake No significant decrease in energy intake  Weight Loss 7.00 - Greater than 10% over 6 months  Body Fat Loss 1 - Mild body fat loss  Body Fat Loss Locations Triceps;Orbital;Buccal region  ChMuscle Mass Loss 1 - Mild muscle mass loss  Muscle Mass Location Temples (temporalis); Clavicles (pectoralis &deltoids); Hand (interosseous)  Fluid Accumulation Unable to assess   Strength Not performed  Chronic Illness - Malnutrition Score  9  Malnutrition Status Moderate malnutrition  Nutrition Related Findings:  Labs and meds reviewed- flomax, protonix, lasix. BM . NPO since  @ 1115. Noted regular diet to start  @ 1500. Pt also has Ensure enlive TID (breakfast, lunch, HS snack). Treatment: receiving RD- regular diet to start  @ 1500. Pt also has Ensure enlive TID (breakfast, lunch, HS snack). Thank you,  Alejandra Vale, RN, BSN, CRCR  [email or web address]  Options provided:  -- Moderate malnutrition  -- Other - I will add my own diagnosis  -- Disagree - Not applicable / Not valid  -- Disagree - Clinically unable to determine / Unknown  -- Refer to Clinical Documentation Reviewer    PROVIDER RESPONSE TEXT:    This patient has moderate malnutrition.     Query created by: Jolene Rodarte on 2022 2:36 PM      Electronically signed by:  Eudora Dakin MD 4/15/2022 10:33 AM

## 2022-04-15 NOTE — DISCHARGE SUMMARY
[address]    Name:  Charlotte Cruz  MR#:   [id number]  :  1945  ACCOUNT #:  [de-identified]  ADMIT DATE:  2022  DISCHARGE DATE:      DISCHARGE DIAGNOSES:  1. Bacteremia with Methicillin-resistant Staphylococcus aureus. 2.  Chronic kidney disease, stage III. 3.  Duodenitis with duodenal ulcers. 4.  Severe anemia. 5.  Iron deficiency, chronic. 6.  Chronic respiratory failure due to chronic obstructive pulmonary disease, on home oxygen. 7.  Chronic diastolic heart failure. 8.  History of deep venous thrombosis. HOSPITAL SUMMARY:  This is a 68-year-old gentleman who recently was hospitalized for COPD exacerbation, had gone back to the emergency room for abdominal pain after that admission, had blood cultures drawn, was discharged home and was clinically doing better per he and his wife's report, but he was called back into the emergency room because his blood cultures came back with Staph, thus he was admitted on the aforementioned date, started on intravenous antibiotics. Infectious Disease was consulted. There was also concern about duodenitis on CT scan and anemia with possible foreign body intra-abdominally, so the patient was started on intravenous Protonix, IV vancomycin. Consulted with GI and Infectious Disease. An endoscopy showed solid food retention, but no evidence for blood or fluid in the stomach. There were two medium and large ulcers in the distal bulb. There was no abdominal pain issues. The recommendation was to resume a regular diet. Avoid NSAIDs. Make a followup at the office, take Protonix twice daily, avoid NSAIDs again and resume Eliquis. A gastrin level was sent on the . It came back normal at 43. The patient's iron is low. It was checked here again, it is 39. He was given iron transfusions intravenously, and he is on oral iron. His hemoglobin has been in the 7 range essentially outside of one check over the last 7 days. It looks like the earlier part of 2022, his hemoglobin was running 9-10, but at the latter part of October, he had been in the seven range then as well. The patient has chronic kidney disease. His creatinine is stable at 1.45. Electrolytes are stable. COPD is stable. He is on his home oxygen therapy. He underwent a transesophageal echocardiogram as well as a transthoracic echocardiogram.  There was no evidence for bacterial endocarditis. His blood cultures are coming back clear now. He was cleared for a PICC line which was placed yesterday. He has an antibiotic plan per Infectious Disease for outpatient. He is now on daptomycin until 05/04, and he will need weekly labs ordered as an outpatient. Those arrangements are being made per Case Management. The patient has been anxious to go home in the last few days. He is up and ambulatory on his own to the bathroom. He is not in any distress. His abdomen is without complaints. No nausea, vomiting, or diarrhea. He denies having any black stools or blood in the stool. Blood pressure is 116/51, pulse 83, temperature 97.8, respiratory rate 18, SaO2 is 96% on 3 L. Lungs, coarse bilaterally. No wheezing. Cardiac exam, regular rate and rhythm. No murmur. Abdomen is benign, soft, nontender. Lower extremities without pitting edema. His H and H are 7 and 22.9. Today, platelets are normal.  White count is normal.  Creatinine 1.45. Electrolytes are within normal range. Last blood culture on 04/08 was no growth, now for 6 days. Chest x-ray on 04/07 showed stable without evidence for acute cardiopulmonary disease, and the transthoracic echo shows an ejection fraction of 50%-55%. The patient can discharge today once arrangements are made for his IV antibiotics and follow up lab checks.   He should see Dr. Burke Yin about his stomach ulcers in 1 month, and his PCP is Dr. Mai Parra in 1 week, and looks like he already has an appointment on 04/20, so that is good news. The patient will discharge on the daptomycin 450 mg IV daily through his PICC line until 05/04. Those instructions are per Infectious Disease. Otherwise, I have re-prescribed all the medications he is supposed to take because it sounds like his wife noted, he had ran out of a lot of his medications and he was not on some of them recently. So this is what we would recommend for his discharge, DuoNeb every 4 hours as needed for wheezing, Protonix 40 mg twice daily, ferrous sulfate 325 mg twice daily with meals, Lasix 20 mg daily, Eliquis 5 mg twice daily, Brovana nebs twice daily, Pulmicort nebs twice daily, Flomax 0.4 mg daily and Norvasc 10 mg daily. The patient is stable for discharge to home with Home Health, nursing, and antibiotics to be done with instructions per Infectious Disease, and the lab instructions are weekly labs, CBC with differential, BMP, and CPK on every Monday. These labs should be faxed to Dr. Byron Nielsen at [phone]. We will put those instructions in his chart as well. He has a do not resuscitate. He is on a regular as tolerated diet. He will continue his home oxygen 2-3 liters and activity as tolerated. Forty five minutes on discharge time today.       Sravanthi Alba MD      [id number]  D:  04/15/2022 9:37  T:  04/15/2022 10:18  JOB #:  [id number]

## 2022-04-15 NOTE — ROUTINE PROCESS
0710  Verbal shift change report given to Jose Elias Grande RN (oncoming nurse) by Lucretia Willis RN (offgoing nurse). Report included the following information SBAR, Kardex, Intake/Output and MAR.

## 2022-04-15 NOTE — PROGRESS NOTES
Patient is discharged home. Understands discharge instructions and follow ups. Unable to locate wallet. Supervisor, security and wife notified. Unable to locate wallet.

## 2022-04-15 NOTE — PROGRESS NOTES
04/14  2351  Bedside and verbal shift change report given to Ruthann Jose, RN & Elli, RN (on coming nurse) by Heather Mendez RN (off going nurse). Report included the following information SBAR, Kardex, Intake/Output and MAR.    0709  Bedside and verbal shift change report given by CAITLIN Goddard & LIZETH Martinez RN(off going nurse) to Brigido Rae RN(on coming nurse). Report included the following information SBAR, Kardex, Intake/Output and MAR.

## 2022-04-15 NOTE — PROGRESS NOTES
Patient resting comfortably without c/o of pain or discomfort. Bedside and Verbal shift change report given to LIZETH Morel/Krystal Goddard (oncoming nurse) by Courtney Bhatia (offgoing nurse). Report included the following information SBAR, Kardex, Intake/Output and MAR.

## 2022-04-15 NOTE — PROGRESS NOTES
[facility]  (A Division of [address] [place] [address])      ID Follow Up Note      Date of Admission: 4/7/2022      Date of Note: 4/15/2022  Reason for FU: MRSA bacteremia      Current Antimicrobials:    Prior Antimicrobials:  1. Vanco IV 4/7 to date        Assessment: Rec / Plan:   Community onset high- grade MRSA bacteremia-- source not clear-possibly skin. NATY is negative for IE    BCX- 4/6- 3/4 + MRSA + CoNS- 1/4   BCX: 4/7- NGSF  BCX 4/8-NGSF  TTE--4/8-- no finding of IE on report  NATY 4/13-- No IE or abscess      CKD Stage -- worse   -- Cr bump to 1.6-- suspect vanco related   Cont current ABX-- no issue with daptomycin first dose. CK is ok    Daptomycin 6mg/kg- daily-> 450 mg IV until May 4    -check cpk, recheck bmp in am    Weekly labs- cbc w/diff, bmp, CPK on Monday     Monitor above labs for ABX adverse effects  Fax labs to Dr Annmarie Sanabria-- [phone]      Gastritis     DW RN--FU with me in 2-3 weeks post DC           COPD  --+ MRSA in Epic 06/2020 and 08/2019  I'm speculating the MRSA came from previous multiple admissions for his COPD attacks    t    Hard of Hearing  Shout into his L ear. Subjective:  Pt seen and examined - moves about room on his own  Upset about not getting his lunch timely at time of exam  Cr is improving- no issue with cough/SOB. Notes and labs reviewed. Imaging reports reviewed- Cr is getting better 1.45, CPK is 33. WBC 7.1       HPI:  CC:  \"My stomach has been hurting\"  Mr Bonnie Chiu is a 63y WM with COPD/frequent admission here for that over last 3m who was called back yesterday after a visit to the ED 4/6 popped (+) MRSA (one set out of both arms); these grew within 18h apiece. He feels fine. No f/s/c. Tired/fatigued, but ascribes that to his lungs. Prior (+) MRSA infection wounds.     Retired          Microbiology:  4/8 - BCx sent  `   4/7 - BCx 2/2 (-)       4/6 - BCx 2/2 (+) MRSA with one set also growing CoNS      Lines / Catheters: peripheral    Active Hospital Problems    Diagnosis Date Noted    MRSA bacteremia 04/07/2022    On supplemental oxygen by nasal cannula 04/02/2022    Gastritis and duodenitis 04/02/2022    Bladder wall thickening 04/02/2022    Acute deep vein thrombosis (DVT) of femoral vein of right lower extremity ([address] 02/03/2022    Acute exacerbation of chronic obstructive pulmonary disease (COPD) ([address] 10/05/2021    PAF (paroxysmal atrial fibrillation) ([facility] 04/05/2021    Acute on chronic respiratory failure with hypoxemia (HCC) 08/19/2019    Age-related physical debility 07/08/2019    Chronic renal disease, stage 3, moderately decreased glomerular filtration rate between 30-59 mL/min/1.73 square meter ([facility] 07/20/2018    Chronic obstructive pulmonary disease ([facility] 04/20/2018    Hypertension      Past Medical History:   Diagnosis Date    BMI 30.0-30.9,adult 4/2/2022    Brain aneurysm     Brain aneurysm     Cancer (HCC)     prostate    CHF (congestive heart failure) (HCC)     CKD (chronic kidney disease)     Closed nondisplaced supracondylar fracture of lower end of left femur without intracondylar extension with delayed healing     COPD (chronic obstructive pulmonary disease) ([address]     Debility     History of home oxygen therapy     [place] (hard of hearing)     Hypertension     Nocturia     On home oxygen therapy     PAF (paroxysmal atrial fibrillation) ([facility]     Pneumonia     Prostate CA ([facility]     Prostate neoplasm     Radiation effect      Past Surgical History:   Procedure Laterality Date    HX HERNIA REPAIR      HX HIP ARTHROSCOPY  04/09/2021     Family History   Problem Relation Age of Onset    Heart Disease Mother      Social History     Socioeconomic History    Marital status:      Spouse name: Not on file    Number of children: Not on file    Years of education: Not on file    Highest education level: Not on file   Occupational History    Not on file   Tobacco Use  Smoking status: Former Smoker     Types: Cigarettes    Smokeless tobacco: Never Used   Substance and Sexual Activity    Alcohol use: No    Drug use: Never    Sexual activity: Not on file   Other Topics Concern    Not on file   Social History Narrative    Not on file     Social Determinants of Health     Financial Resource Strain:     Difficulty of Paying Living Expenses: Not on file   Food Insecurity:     Worried About Running Out of Food in the Last Year: Not on file    Amaya of Food in the Last Year: Not on file   Transportation Needs:     Lack of Transportation (Medical): Not on file    Lack of Transportation (Non-Medical):  Not on file   Physical Activity:     Days of Exercise per Week: Not on file    Minutes of Exercise per Session: Not on file   Stress:     Feeling of Stress : Not on file   Social Connections:     Frequency of Communication with Friends and Family: Not on file    Frequency of Social Gatherings with Friends and Family: Not on file    Attends [employer] Services: Not on file    Active Member of [address] [place] [address] or Organizations: Not on file    Attends Club or Organization Meetings: Not on file    Marital Status: Not on file   Intimate Partner Violence:     Fear of Current or Ex-Partner: Not on file    Emotionally Abused: Not on file    Physically Abused: Not on file    Sexually Abused: Not on file   Housing Stability:     Unable to Pay for Housing in the Last Year: Not on file    Number of [place] in the Last Year: Not on file    Unstable Housing in the Last Year: Not on file       Allergies:  Aspirin and Bactrim [sulfamethoxazole-trimethoprim]     Medications:  Current Facility-Administered Medications   Medication Dose Route Frequency    heparin (porcine) 100 unit/mL injection 500 Units  500 Units InterCATHeter Q8H PRN    DAPTOmycin (CUBICIN) 450 mg in 0.9% sodium chloride 9 mL IV Syringe  6 mg/kg IntraVENous Q24H    pantoprazole (PROTONIX) tablet 40 mg  40 mg Oral ACB&D    sodium chloride (NS) flush 5-10 mL  5-10 mL IntraVENous PRN    albuterol-ipratropium (DUO-NEB) 2.5 MG-0.5 MG/3 ML  3 mL Nebulization Q4H PRN    apixaban (ELIQUIS) tablet 5 mg  5 mg Oral BID    tamsulosin (FLOMAX) capsule 0.4 mg  0.4 mg Oral QPM    traMADoL (ULTRAM) tablet 50 mg  50 mg Oral Q8H PRN    acetaminophen (TYLENOL) tablet 650 mg  650 mg Oral Q6H PRN    ondansetron (ZOFRAN) injection 4 mg  4 mg IntraVENous Q6H PRN    carboxymethylcellulose sodium (REFRESH PLUS) 0.5 % ophthalmic solution 2 Drop  2 Drop Both Eyes DAILY PRN    arformoteroL (BROVANA) neb solution 15 mcg  15 mcg Nebulization BID RT    budesonide (PULMICORT) 500 mcg/2 ml nebulizer suspension  500 mcg Nebulization BID RT    amLODIPine (NORVASC) tablet 10 mg  10 mg Oral DAILY        ROS:  Constitutional: negative for fevers, chills and sweats  Respiratory: negative for cough  Cardiovascular: negative for chest pain, dyspnea  Gastrointestinal: negative for nausea, vomiting and diarrhea  Genitourinary:negative for dysuria     Physical Exam:    Temp (24hrs), Av °F (36.7 °C), Min:97.4 °F (36.3 °C), Max:98.3 °F (36.8 °C)    Visit Vitals  /61 (BP 1 Location: Left upper arm)   Pulse 86   Temp 97.4 °F (36.3 °C)   Resp 22   Ht 5' 8\" (1.727 m)   Wt 80.1 kg (176 lb 9.6 oz)   SpO2 99%   BMI 26.85 kg/m²       General: Well developed, well nourished 64 y.o.  WM in no acute distress. L PICC in place    Head: normocephalic, without obvious abnormality    Cardio:  regular rate and rhythm, S1, S2 normal, no murmur, click, rub or gallop  Chest: inspection normal - no chest wall deformities or tenderness, respiratory effort normal  Lungs: clear to auscultation, no wheezes or rales and unlabored breathing  Abdomen: soft, non-tender. Bowel sounds normal  Extremities:  no redness or tenderness in the calves or thighs, no edema  Skin. No peripheral sign of IE. Dry and coarsened skin over hands  Neuro: awake, Alert X3.  Hard of hearing- on right ear       Lab results:    Chemistry  Recent Labs     04/15/22  0100 04/14/22  0328 04/13/22  0101   * 169* 96    141 140   K 3.7 3.8 4.3    107 106   CO2 26 26 30   BUN 40* 36* 37*   CREA 1.45* 1.64* 1.33*   CA 7.4* 7.1* 7.0*   AGAP 7 8 4   BUCR 28* 22* 28*       CBC w/ Diff  Recent Labs     04/15/22  0100 04/14/22  0328 04/13/22  0101   WBC 7.1 7.4 7.2   RBC 2.50* 2.47* 2.60*   HGB 7.0* 7.0* 7.3*   HCT 22.9* 22.8* 23.6*    272 278   GRANS 59 64 58   LYMPH 16* 15* 16*   EOS 9* 9* 10*       Microbiology  All Micro Results     Procedure Component Value Units Date/Time    CULTURE, BLOOD [[id number]] Collected: 04/08/22 0214    Order Status: Completed Specimen: Blood Updated: 04/14/22 0747     Special Requests: NO SPECIAL REQUESTS        Culture result: NO GROWTH 6 DAYS       CULTURE, BLOOD [[id number]] Collected: 04/07/22 1022    Order Status: Completed Specimen: Blood Updated: 04/13/22 1025     Special Requests: NO SPECIAL REQUESTS        Culture result: NO GROWTH 6 DAYS       CULTURE, BLOOD [[id number]] Collected: 04/07/22 1022    Order Status: Completed Specimen: Blood Updated: 04/13/22 1025     Special Requests: NO SPECIAL REQUESTS        Culture result: NO GROWTH 6 DAYS       ENTERIC BACTERIA PANEL, DNA [[id number]] Collected: 04/07/22 1030    Order Status: Canceled Specimen: Stool          Lillian Sanabria MD  [facility])  Office #:     [phone]  [phone]   Office Fax: [phone]

## 2022-04-15 NOTE — DISCHARGE INSTRUCTIONS
Patient Education        Transesophageal Echocardiogram: What to Expect at Home  Your Recovery  A transesophageal echocardiogram is a test to help your doctor look at the inside of your heart. A small device called a transducer directs sound waves toward your heart. The sound waves make a picture of the heart's valves and chambers. Before the test, your throat was sprayed with medicine to numb it. Your throat may be sore for a few days. You may have had a sedative to help you relax. You may be unsteady after having sedation. It can take a few hours for the medicine's effects to wear off. Common side effects include nausea, vomiting, and feeling sleepy or tired. This care sheet gives you a general idea about how long it will take for you to recover. But each person recovers at a different pace. Follow the steps below to feel better as quickly as possible. How can you care for yourself at home? Activity    · If a sedative was used, your doctor will tell you when it is safe for you to do your normal activities.     · For your safety, do not drive or operate any machinery that could be dangerous. Wait until the medicine wears off and you can think clearly and react easily. Diet    · Do not eat or drink until the numbness in your throat wears off.     · When the numbness is gone, you can eat your normal diet.     · Throat lozenges and warm saltwater gargles can help relieve throat soreness. Throat lozenges can be used by people age 3 or older. And most people can gargle at age 6 and older.     · Do not drink alcohol for 24 hours. Follow-up care is a key part of your treatment and safety. Be sure to make and go to all appointments, and call your doctor if you are having problems. It's also a good idea to know your test results and keep a list of the medicines you take. When should you call for help? Call 911 anytime you think you may need emergency care.  For example, call if:    · Your stools are maroon or very bloody.     · You vomit blood or what looks like coffee grounds. Call your doctor now or seek immediate medical care if:    · You have pain in your chest, belly, or back.     · You have new or worse trouble swallowing.     · You have trouble breathing. Watch closely for changes in your health, and be sure to contact your doctor if you have any problems. Current as of: January 10, 2022               Content Version: 13.2  © 2006-2022 [employer]. Care instructions adapted under license by [employer] (which disclaims liability or warranty for this information). If you have questions about a medical condition or this instruction, always ask your healthcare professional. [place] [address] any warranty or liability for your use of this information.            Weekly labs- cbc w/diff, bmp, CPK on Monday     Monitor above labs for ABX adverse effects  Fax labs to Dr Eileen Ortiz-- [phone]

## 2022-04-15 NOTE — PROGRESS NOTES
Problem: Falls - Risk of  Goal: *Absence of Falls  Description: Document Star  Fall Risk and appropriate interventions in the flowsheet.   Outcome: Progressing Towards Goal  Note: Fall Risk Interventions:  Mobility Interventions: Patient to call before getting OOB         Medication Interventions: Patient to call before getting OOB    Elimination Interventions: Call light in reach              Problem: Patient Education: Go to Patient Education Activity  Goal: Patient/Family Education  Outcome: Progressing Towards Goal     Problem: Pain  Goal: *Control of Pain  Outcome: Progressing Towards Goal

## 2022-04-15 NOTE — PROGRESS NOTES
Problem: Mobility Impaired (Adult and Pediatric)  Goal: *Acute Goals and Plan of Care (Insert Text)  Description: No goals necessary at this time as pt demonstrates functional mobility with at baseline. Outcome: Resolved/Met    physical Therapy EVALUATION & Discharge    Patient: Domi Lima (32 y.o. male)  Date: 4/14/2022 (late entry)  Primary Diagnosis: Bacteremia [R78.81]  Procedure(s) (LRB):  ESOPHAGOGASTRODUODENOSCOPY (EGD); biopsy (N/A) 7 Days Post-Op   Precautions: Contact,  pt is very [place]     ASSESSMENT AND RECOMMENDATIONS:  Based on the objective data described below, the patient seen s/p admission for dx as above. Pt found seated EOB and presents with ROM/motor performance LE's grossly decr'd but functional.  Pt demonstrates donning shoes with SBA. Performs GT/RW with mod I 90ft in room only (due to precautions). Lisbet increased during turn negotiation, therefore required vc to slow same for increased safety and accurate f/up noted. Noted some SOB post gt, which is baseline for pt (on RA). Pt extremely [place] and written notes used for communication t/o session. Pt functioning at baseline and no further skilled PT intervention indicated. Nurse Viky notified of same. Skilled physical therapy is not indicated at this time. Discharge Recommendations: None  Further Equipment Recommendations for Discharge: N/A      SUBJECTIVE:   Patient stated I can walk, I'm fine.     OBJECTIVE DATA SUMMARY:     Past Medical History:   Diagnosis Date    BMI 30.0-30.9,adult 4/2/2022    Brain aneurysm     Brain aneurysm     Cancer [facility])     prostate    CHF (congestive heart failure) ([facility])     CKD (chronic kidney disease)     Closed nondisplaced supracondylar fracture of lower end of left femur without intracondylar extension with delayed healing     COPD (chronic obstructive pulmonary disease) [facility]     Debility     History of home oxygen therapy     [place] (hard of hearing)     Hypertension     Nocturia     On home oxygen therapy     PAF (paroxysmal atrial fibrillation) (HCC)     Pneumonia     Prostate CA ([facility]     Prostate neoplasm     Radiation effect      Past Surgical History:   Procedure Laterality Date    HX HERNIA REPAIR      HX HIP ARTHROSCOPY  04/09/2021     Barriers to Learning/Limitations: yes;  sensory deficits-vision/hearing/speech  Compensate with: Other written communication  Prior Level of Function/Home Situation: Mod I with RW PTA  Home Situation  Home Environment: Trailer/mobile home  # Steps to Enter: 6  Rails to Enter: Yes  Hand Rails : Bilateral  Wheelchair Ramp: No  One/Two Story Residence: One story  Living Alone: No  Support Systems: Spouse/Significant Other  Patient Expects to be Discharged to[de-identified] Home  Current DME Used/Available at Home: Cane, straight;Walker, rolling  Tub or Shower Type: Shower  Critical Behavior:  Neurologic State: Alert; Appropriate for age  Orientation Level: Oriented X4  Cognition: Follows commands  Safety/Judgement: Awareness of environment; Fall prevention  Psychosocial  Patient Behaviors: Calm; Cooperative  Purposeful Interaction: Yes  Pt Identified Daily Priority: Clinical issues (comment)  Caritas Process: Nurture loving kindness;Establish trust;Teaching/learning  Caring Interventions: Reassure  Reassure: Therapeutic listening; Informing  Therapeutic Modalities: Humor; Intentional therapeutic touch  Skin Condition/Temp: Dry;Warm  Skin Integrity: Intact  Skin Integumentary  Skin Color: Appropriate for ethnicity  Skin Condition/Temp: Dry;Warm  Skin Integrity: Intact  Strength:    Strength: Generally decreased, functional  Tone & Sensation:   Tone: Normal  Range Of Motion:  AROM: Generally decreased, functional  Functional Mobility:  Transfers:  Sit to Stand: Modified independent  Stand to Sit: Modified independent  Balance:   Sitting: Intact  Standing: Intact; With support  Ambulation/Gait Training:  Distance (ft): 90 Feet (ft)  Assistive Device: Gait belt;Walker, rolling  Ambulation - Level of Assistance: Modified independent  Gait Description (WDL): Exceptions to WDL  Gait Abnormalities: Decreased step clearance  Interventions: Verbal cues (cues to slow josé during turns for safety)  Pain:  Pain Scale 1: Numeric (0 - 10)  Pain Intensity 1: 0  Activity Tolerance:   Fair   Please refer to the flowsheet for vital signs taken during this treatment. After treatment:   [x]         Patient left in no apparent distress sitting up EOB  []         Patient left in no apparent distress in bed  [x]         Call bell left within reach  [x]         Nursing notified  []         Caregiver present  []         Bed alarm activated    COMMUNICATION/EDUCATION:   [x]         Fall prevention education was provided and the patient/caregiver indicated understanding. [x]         Patient/family have participated as able in goal setting and plan of care. []         Patient/family agree to work toward stated goals and plan of care. []         Patient understands intent and goals of therapy, but is neutral about his/her participation. []         Patient is unable to participate in goal setting and plan of care.     Thank you for this referral.  Jalyn Garcia, PT    Eval Complexity: History: HIGH Complexity :3+ comorbidities / personal factors will impact the outcome/ POC Exam:LOW Complexity : 1-2 Standardized tests and measures addressing body structure, function, activity limitation and / or participation in recreation  Presentation: LOW Complexity : Stable, uncomplicated  Clinical Decision Making:Low Complexity    Overall Complexity:LOW     Thank you for this referral.  Jalyn Garcia, PT   Time Calculation: 34 mins

## 2022-04-17 ENCOUNTER — HOME CARE VISIT (OUTPATIENT)
Dept: SCHEDULING | Facility: HOME HEALTH | Age: 77
End: 2022-04-17
Payer: MEDICARE

## 2022-04-17 PROCEDURE — 3331090002 HH PPS REVENUE DEBIT

## 2022-04-17 PROCEDURE — 400013 HH SOC

## 2022-04-17 PROCEDURE — 3331090001 HH PPS REVENUE CREDIT

## 2022-04-17 PROCEDURE — G0299 HHS/HOSPICE OF RN EA 15 MIN: HCPCS

## 2022-04-17 PROCEDURE — 400018 HH-NO PAY CLAIM PROCEDURE

## 2022-04-17 NOTE — Clinical Note
Patient admitted to [address] [place] [address] on 4/17/22 for IV antibiotics, lab work, PICC care. Redd Hinds MD  notified of patient admission to home health and plan of care including anticipated frequency of 3w1, 1w4, 4 prn and treatments/interventions/modalities of IV antibiotics, lab work, PICC care. Medications reconciled and all medications are not available in the home this visit.  cg calling pharmacy to get protonix and brovana. cg also calling for eliquis substitute due to expensive of medication. No medication interactions noted on admission visit. Please call #[phone] with any questions. Thank you!   Lizette Baugh, RN, BSN

## 2022-04-18 ENCOUNTER — HOSPITAL ENCOUNTER (OUTPATIENT)
Dept: LAB | Age: 77
Discharge: HOME | End: 2022-04-18
Payer: COMMERCIAL

## 2022-04-18 ENCOUNTER — HOME CARE VISIT (OUTPATIENT)
Dept: SCHEDULING | Facility: HOME HEALTH | Age: 77
End: 2022-04-18
Payer: MEDICARE

## 2022-04-18 ENCOUNTER — PATIENT OUTREACH (OUTPATIENT)
Dept: CASE MANAGEMENT | Age: 77
End: 2022-04-18

## 2022-04-18 VITALS
TEMPERATURE: 98.4 F | RESPIRATION RATE: 17 BRPM | DIASTOLIC BLOOD PRESSURE: 76 MMHG | HEART RATE: 78 BPM | SYSTOLIC BLOOD PRESSURE: 130 MMHG | OXYGEN SATURATION: 98 %

## 2022-04-18 VITALS
RESPIRATION RATE: 16 BRPM | DIASTOLIC BLOOD PRESSURE: 55 MMHG | HEART RATE: 98 BPM | TEMPERATURE: 87.1 F | SYSTOLIC BLOOD PRESSURE: 114 MMHG | OXYGEN SATURATION: 97 %

## 2022-04-18 VITALS
TEMPERATURE: 98.5 F | HEART RATE: 99 BPM | OXYGEN SATURATION: 98 % | RESPIRATION RATE: 18 BRPM | DIASTOLIC BLOOD PRESSURE: 62 MMHG | SYSTOLIC BLOOD PRESSURE: 140 MMHG

## 2022-04-18 LAB
ANION GAP SERPL CALC-SCNC: 3 MMOL/L (ref 3–18)
BASOPHILS # BLD: 0.1 K/UL (ref 0–0.1)
BASOPHILS # BLD: ABNORMAL K/UL
BASOPHILS NFR BLD: 1 % (ref 0–2)
BASOPHILS NFR BLD: ABNORMAL %
BUN SERPL-MCNC: 27 MG/DL (ref 7–18)
BUN/CREAT SERPL: 20 (ref 12–20)
CALCIUM SERPL-MCNC: 7.8 MG/DL (ref 8.5–10.1)
CHLORIDE SERPL-SCNC: 105 MMOL/L (ref 100–111)
CK SERPL-CCNC: 41 U/L (ref 39–308)
CO2 SERPL-SCNC: 30 MMOL/L (ref 21–32)
CREAT SERPL-MCNC: 1.37 MG/DL (ref 0.6–1.3)
DIFFERENTIAL METHOD BLD: ABNORMAL
DIFFERENTIAL METHOD BLD: ABNORMAL
EOSINOPHIL # BLD: 0.7 K/UL (ref 0–0.4)
EOSINOPHIL # BLD: ABNORMAL K/UL
EOSINOPHIL NFR BLD: 6 % (ref 0–5)
EOSINOPHIL NFR BLD: ABNORMAL %
ERYTHROCYTE [DISTWIDTH] IN BLOOD BY AUTOMATED COUNT: 16.6 % (ref 11.6–14.5)
ERYTHROCYTE [DISTWIDTH] IN BLOOD BY AUTOMATED COUNT: 16.6 % (ref 11.6–14.5)
GLUCOSE SERPL-MCNC: 94 MG/DL (ref 74–99)
HCT VFR BLD AUTO: 24.5 % (ref 36–48)
HCT VFR BLD AUTO: 24.5 % (ref 36–48)
HGB BLD-MCNC: 7.5 G/DL (ref 13–16)
HGB BLD-MCNC: 7.5 G/DL (ref 13–16)
IMM GRANULOCYTES # BLD AUTO: 0.2 K/UL (ref 0–0.04)
IMM GRANULOCYTES # BLD AUTO: ABNORMAL K/UL
IMM GRANULOCYTES NFR BLD AUTO: 1 % (ref 0–0.5)
IMM GRANULOCYTES NFR BLD AUTO: ABNORMAL %
LYMPHOCYTES # BLD: 1.2 K/UL (ref 0.9–3.6)
LYMPHOCYTES # BLD: ABNORMAL K/UL
LYMPHOCYTES NFR BLD: 11 % (ref 21–52)
LYMPHOCYTES NFR BLD: ABNORMAL %
MCH RBC QN AUTO: 28 PG (ref 24–34)
MCH RBC QN AUTO: 28 PG (ref 24–34)
MCHC RBC AUTO-ENTMCNC: 30.6 G/DL (ref 31–37)
MCHC RBC AUTO-ENTMCNC: 30.6 G/DL (ref 31–37)
MCV RBC AUTO: 91.4 FL (ref 78–100)
MCV RBC AUTO: 91.4 FL (ref 78–100)
MONOCYTES # BLD: 0.7 K/UL (ref 0.05–1.2)
MONOCYTES # BLD: ABNORMAL K/UL
MONOCYTES NFR BLD: 7 % (ref 3–10)
MONOCYTES NFR BLD: ABNORMAL %
NEUTS SEG # BLD: 7.9 K/UL (ref 1.8–8)
NEUTS SEG # BLD: ABNORMAL K/UL
NEUTS SEG NFR BLD: 74 % (ref 40–73)
NEUTS SEG NFR BLD: ABNORMAL %
NRBC # BLD: 0 K/UL (ref 0–0.01)
NRBC # BLD: 0 K/UL (ref 0–0.01)
NRBC BLD-RTO: 0 PER 100 WBC
NRBC BLD-RTO: 0 PER 100 WBC
PLATELET # BLD AUTO: 282 K/UL (ref 135–420)
PLATELET # BLD AUTO: 282 K/UL (ref 135–420)
PMV BLD AUTO: 9.2 FL (ref 9.2–11.8)
PMV BLD AUTO: 9.2 FL (ref 9.2–11.8)
POTASSIUM SERPL-SCNC: 3.9 MMOL/L (ref 3.5–5.5)
RBC # BLD AUTO: 2.68 M/UL (ref 4.35–5.65)
RBC # BLD AUTO: 2.68 M/UL (ref 4.35–5.65)
SODIUM SERPL-SCNC: 138 MMOL/L (ref 136–145)
WBC # BLD AUTO: 10.6 K/UL (ref 4.6–13.2)
WBC # BLD AUTO: 10.6 K/UL (ref 4.6–13.2)

## 2022-04-18 PROCEDURE — 80048 BASIC METABOLIC PNL TOTAL CA: CPT

## 2022-04-18 PROCEDURE — 3331090002 HH PPS REVENUE DEBIT

## 2022-04-18 PROCEDURE — 3331090001 HH PPS REVENUE CREDIT

## 2022-04-18 PROCEDURE — G0151 HHCP-SERV OF PT,EA 15 MIN: HCPCS

## 2022-04-18 PROCEDURE — 82550 ASSAY OF CK (CPK): CPT

## 2022-04-18 PROCEDURE — G0299 HHS/HOSPICE OF RN EA 15 MIN: HCPCS

## 2022-04-18 PROCEDURE — 85025 COMPLETE CBC W/AUTO DIFF WBC: CPT

## 2022-04-18 NOTE — Clinical Note
Brien Montanez is a 63 yo male who was recently hospitalized as a result of duodenitis and was found to have bacteremia with MRSA. Pt is currently receiving in home IV antibiotics. Pt was evaluated for home health physical therapy and is Evaluation only as patient does not feel he needs home health PT at this time. Pt denies any loss of function from recent hospitalization. Pt to continue with home health SN at this time.

## 2022-04-18 NOTE — HOME HEALTH
SUMMARY: Veronica Hidalgo is a 65 yo male who was recently hospitalized as a result of duodenitis and was found to have bacteremia with MRSA. Pt is currently receiving in home IV antibiotics. Pt was evaluated for home health physical therapy and is Evaluation only as patient does not feel he needs home health PT at this time. Pt denies any loss of function from recent hospitalization. Pt to continue with home health SN at this time. To Lakhani PRECAUTIONS: IV antibiotics for MRSA. Supplemental oxygen. SUBJECTIVE: Patient reports he is doing ok. He wants to know when the PICC line is coming out. Pt reports he won't be able to do much today because of his knee. His left knee is hurting. He reports this happens sometimes from his arthritis. LIVING SITUATION: Pt lives in a one story residence with his wife. 6 steps to enter with rail. Uneven dom noted. REQUIRES CAREGIVER ASSISTANCE FOR: Wife currently assisting with transportation, meals, and medications. PLOF: Pt was able to ambulate on even surfaces with walker, supervision for uneven surfaces. Wife providing transportation. MEDICATIONS REVIEWED AND UPDATED: Pt/wife do not report any medication changes. NEXT MD APPT: PCP 4/20/22. To Lakhani ROM: LE ROM WFL for ADLs. STRENGTH: Unable to perform MMT as patient could not hear instructions. Pt demonstrated LE strength at least 3/5. BED MOBILITY: Pt reports he is able to independently complete. Not tested per patient request due to knee pain. TRANSFERS: Sit to stand/toilet transfers: supervision only with B UE assist.  Verbal cues for safe hand placement. GAIT: Gait training on even surfaces with walker x 90 feet. Supervision with cuing for safety when navigating uneven walkway. STAIRS: Not performed due to knee pain. Wife/patient report able to complete with use of rail and supervision. BALANCE: Pt demonstrated good static standing balance and good sitting balance. To Lakhani   PATIENT EDUCATION PROVIDED THIS VISIT: safety, HEP, walking, fall risk/prevention strategies, DVT monitoring/prevention. HEP consisting of:  1. Walking with walker as tolerated. 2. Ankle pumps throughout day. 3. 2x daily as tolerated: quad/glute sets 15x5\", supine heel slide 10x, supine hip abd 10x, LAQ 10x. Written instructions issued. Patient/caregiver verbalized understanding. Pt did not complete exercises as he was frustrated with his ability to hear instructions. Reviewed HEP with wife. Patient level of understanding of education provided:  Pt had difficulty with education due to hearing loss. His wife as able to assist with communication. Patient response to treatment: Pt demonstrated safe use of walker. SPO2 remained > 96% throughout session. Pt was on room air. Pt has supplemental oxygen which his wife reports he is supposed to use all of the time, but he doesn't. .  CONTINUED NEED FOR THE FOLLOWING SKILLS: Evaluation only. Pt reports he is at [facility] and does not require therapy at this time. PLAN: Evaluation only for PT.   DISCHARGE PLANNING DISCUSSED: Pt to continue with skilled nursing at this time. .  Pt noted to be non-compliant with medical advice:  Pt not consistently using oxygen. Wife reports patient wants PICC line removed.

## 2022-04-18 NOTE — PROGRESS NOTES
Care Transitions Initial Call    Call within 2 business days of discharge: Yes     Patient: Navi Sen Patient :  MRN: [id number]    Last Discharge [address]       Complaint Diagnosis Description Type Department Provider    22 Abnormal Lab Results Bacteremia ED to Hosp-Admission (Discharged) (ADMIT) Gilda Vegas MD; Leslie Zaldivar .. Was this an external facility discharge? No Discharge Facility: [facility] 2022 to 4/15/2022. Challenges to be reviewed by the provider   Additional needs identified to be addressed with provider: no  none         Method of communication with provider : none    Discussed COVID-19 related testing which was not done at this time. Test results were not done. Patient informed of results, if available? n/a     Advance Care Planning:   Does patient have an Advance Directive:  health care decision makers updated    Inpatient Readmission Risk score: Unplanned Readmit Risk Score: 34.8 ( )    Was this a readmission? yes   Patient stated reason for the admission: MRSA becteremia    Patients top risk factors for readmission: medical condition-MRSA bacteremia and support system   Interventions to address risk factors: Scheduled appointment with PCPJune    Care Transition Nurse (CTN) contacted the patient by telephone to perform post hospital discharge assessment. Verified name and  with patient as identifiers. Provided introduction to self, and explanation of the CTN role. CTN reviewed discharge instructions, medical action plan and red flags with patient who verbalized understanding. Were discharge instructions available to patient? yes. Reviewed appropriate site of care based on symptoms and resources available to patient including: PCP, Home Health and [address] [place] [address] Road. Patient given an opportunity to ask questions and does not have any further questions or concerns at this time.  The patient agrees to contact the PCP office for questions related to their healthcare. Medication reconciliation was performed with patient, who verbalizes understanding of administration of home medications. Advised obtaining a 90-day supply of all daily and as-needed medications. Referral to Pharm D needed: no     Home Health/Outpatient orders at discharge: home health care  [address]: [facility]  Date of initial visit: 4/18/2022    Durable Medical Equipment ordered at discharge: None  [facility] - [id number] received: n/a    Covid Risk Education    Educated patient about risk for severe COVID-19 due to risk factors according to CDC guidelines. CTN reviewed discharge instructions, medical action plan and red flag symptoms with the patient who verbalized understanding. Discussed COVID vaccination status: yes. Education provided on COVID-19 vaccination as appropriate. Discussed exposure protocols and quarantine with CDC Guidelines. Patient was given an opportunity to verbalize any questions and concerns and agrees to contact CTN or health care provider for questions related to their healthcare. Was patient discharged with a pulse oximeter? no. Discussed and confirmed pulse oximeter discharge instructions and when to notify provider or seek emergency care. Discussed follow-up appointments. If no appointment was previously scheduled, appointment scheduling offered: yes. Is follow up appointment scheduled within 7 days of discharge? yes.    [facility] follow up appointment(s):   Future Appointments   Date Time Provider Mike Lamar   4/18/2022  2:00 PM Eddie Zaldivar RN [facility]   4/20/2022 To Be Determined Eddie Zaldivar RN [facility]   4/25/2022 To Be Determined Eddie Zaldivar RN [facility]   5/2/2022 To Be Determined Eddie Zaldivar RN [facility]   5/9/2022 To Be Determined Eddie Zaldivar RN [address] [facility]   5/16/2022 To Be Determined Eddie Zaldivar  [facility] [id number] [facility] follow up appointment(s): Shayan 4/20/2022    Plan for follow-up call in 5-7 days based on severity of symptoms and risk factors. Plan for next call: symptom management-ensure that patient symptoms are decreasing  CTN provided contact information for future needs. Goals Addressed                 This Visit's Progress     Prevent complications post hospitalization. 1. CTN will monitor X 4 weeks    2. Ensure provider appt is scheduled within 7 days post-discharge 4/18/2022 Patient has PCP appt 4/:20 at 10:30 am    3. Confirm patient attended post-discharge provider apt    4. Complete post-visit call to confirm attendance and update care needs  4/18/2022 Patient is feeling much better this am. He is eating and drinking well. 5. Review/educate common or potential \"red flags\" of condition worsening 4/18/2022 Reviewed signs/symptoms of MRSA bacteremia such as red area, swollen, painful, warm to touch, accompanied by fever to name a few. 6. Evaluate adherence to medications and priority barriers to resolve  4/18/2022 Patient has all meds and is taking as directed. 7. Instruct on adherence to medications as ordered and assess for therapeutic response and side-effects  4/18/2022 patient  knows why he is taking meds and knows when to call physician with any issues. 8. Discuss and evaluate ADL performance. Provide recommendations on energy conservation, particularly related to transition home from an inpatient admission. 4/18/2022 Patient is using walker at present. He does what he can and rests as needed.

## 2022-04-18 NOTE — HOME HEALTH
Skilled reason for visit: Patient was recently hospitalized for Duodentis, requiring observation by a SN for s/s of decomposition or adverse effects resulting from newly prescribed medications. Skilled observation needed to determine if new medication regimen prescribed requires modifications or other therapeutic interventions considered until pt's clinical condition or treatment has stabilized. Caregiver involvement:  Patient's caregiver is his wife and step-daughter. Caregiver assists patient with bathing, dressing, walking, bathroom, meal prep and setup, medication management, grocery shopping, household chores, transportation to MD appointment and home exercise program.  Medications reconciled and all medications are available in the home this visit. The following education was provided regarding medications, medication interactions, and look alike modifications. DAPTOMYCIN 450 MG  - antibiotic    -    Contact Agency or MD with questions. Medications are effective at this time. Patient states understanding. Patient education provided this visit:  Zen Null Disease Management: IV infusion, PICC line care teaching, pain management, constipation prevention, fall precautions, s/s of infection, deep breathing exercises. IV infusion teaching and PICC line flushes teaching with patient's step-daughter, Bran Osorio, today. Return demonstration performed, SN answered all questions. ..X...  Infection Control:   Proper hand washing with soap and water, after each trip to the bathroom and after eating meals    Patient level of understanding of education provided: Pt/cg verbalized understanding of all education and repeated back teaching   Skilled Care Performed this visit: Disease process teaching, medication teaching, physical assessment and monitoring  Patient response to procedure performed:  Pt verbalized satisfaction with PICC care, dressing change, lab work  Sharps Education Provided: Yes  Goals/teaching progressing. Patient's goal is to have infection healing. Progressing toward goals. Patient has remained free from falls, free from infection; no safety concerns at this time and is ambulating independently with walker. SN to complete education of patient and patient to follow up with any further questions or concerns with Dr Forde Runner exercise program:   Continued need for the following skills: Nursing  Patient and/or caregiver notified and agree to changes in the Plan of Care: No changes   The following discharge planning was discussed with the pt/caregiver:  SN to continue education of patient and discharge patient when teaching and goals are met.

## 2022-04-19 ENCOUNTER — HOME CARE VISIT (OUTPATIENT)
Dept: SCHEDULING | Facility: HOME HEALTH | Age: 77
End: 2022-04-19
Payer: MEDICARE

## 2022-04-19 VITALS
HEART RATE: 80 BPM | RESPIRATION RATE: 20 BRPM | TEMPERATURE: 97.4 F | SYSTOLIC BLOOD PRESSURE: 120 MMHG | DIASTOLIC BLOOD PRESSURE: 61 MMHG | OXYGEN SATURATION: 100 %

## 2022-04-19 LAB
FAX TO INFO,FAXT: NORMAL
FAX TO NUMBER,FAXN: NORMAL

## 2022-04-19 PROCEDURE — 3331090001 HH PPS REVENUE CREDIT

## 2022-04-19 PROCEDURE — G0495 RN CARE TRAIN/EDU IN HH: HCPCS

## 2022-04-19 PROCEDURE — 3331090002 HH PPS REVENUE DEBIT

## 2022-04-19 NOTE — HOME HEALTH
Skilled reason for visit: Patient was recently hospitalized for  , requiring observation by a SN for s/s of decomposition or adverse effects resulting from newly prescribed medications. Skilled observation needed to determine if new medication regimen prescribed requires modifications or other therapeutic interventions considered until pt's clinical condition or treatment has stabilized. Caregiver involvement:  Patient's caregiver is   . Caregiver assists patient with bathing, dressing, walking, bathroom, meal prep and setup, medication management, grocery shopping, household chores, transportation to MD appointment and home exercise program.  Medications reconciled and all medications are available in the home this visit. The following education was provided regarding medications, medication interactions, and look alike modifications. Contact Agency or MD with questions. Medications are effective at this time. Patient states understanding. Patient education provided this visit:  Zen Altman Disease Management: IV administration, NS and Heparin flushes with caregiver, step-daughter, Fermín Brand, fall precautions, s/s of infection, deep breathing exercises. Patient/cg level of understanding of education provided: Pt/cg verbalized understanding of all education and repeated back teaching   Skilled Care Performed this visit: Disease process teaching, medication teaching, physical assessment and monitoring  Patient response to procedure performed:  Cg performed IV administration and pt verbalized satisfaction  Sharps Education Provided: NA  Goals/teaching progressing. Patient's goal is to have infection healing. Progressing toward goals. Patient has remained free from falls, free from infection; no safety concerns at this time and is ambulating independently with walker.   SN to complete education of patient and patient to follow up with any further questions or concerns with Dr Chantel Malik exercise program: Continued need for the following skills: Nursing  Patient and/or caregiver notified and agree to changes in the Plan of Care: No changes    The following discharge planning was discussed with the pt/caregiver:  SN to continue education of patient and discharge patient when teaching and goals are met.

## 2022-04-19 NOTE — HOME HEALTH
Skilled services/Home bound verification:   Skilled Reason for admission/summary of clinical condition:  duodenitis requiring IV antibiotics, lab work, PICC care. This patient is homebound for the following reasons Requires considerable and taxing effort to leave the home  and Requires the assistance of 1 or more persons to leave the home . Caregiver: spouse. Caregiver assists with iadls, adls, meal prep, med management, taking to md appointments, able to learn IV abx administration. Cg notified that she will need to learn how to administer abx, reporting her and her daughter are willing. Cg and daughter are present during visit today and engaged in learning, performing some hands on skills for IV abx administration- will require 2 additional visits for teaching and teach back. Medications reconciled and all medications are not available in the home this visit.  cg calling pharmacy to get protonix and brovana. cg also calling for eliquis substitute due to expensive of medication. The following education was provided regarding medications, medication interactions, and look alike medications (specify): reviewed side effects, purposes, dosage, frequencies. Medications  are effective at this time. High risk medication teaching regarding anticoagulants, hyperglycemic agents or opiod narcotics performed (specify) eliquis-reviewed side effects, purposes, dosage, frequencies. patient to follow eliquis regimen as directed by MD and to monitor for s/s of excessive bleeding, aware who to report to/when. Patients made aware to report bleeding to their practitioner including blood in the urine or stools and bleeding from minor cuts and scratches that won't stop.  Patient also made aware to recognize the signs and symptoms of significant bleeding that can include unsteady gait, dizziness, new headache, and nausea and vomiting. (pt/cg unsure if patient will be taking eliquis, requesting different blood thinner due to cost).  MD notified of any discrepancies/medication interactions- none. PCP notified of medications not in home. Home health supplies by type and quantity ordered/delivered this visit include: na  Patient education provided this visit to include: patient/cg instructed to monitor for edema/increase in edema, to elevate extremity when edema occurs and to notify md if edema exceeds normal limits for patient, baseline edema noted to bilateral lower extremities, per pt/cg. patient made aware to monitor for s/s of infection [increased swelling, increased redness around site, increased pain, foul smelling drainage, fever] aware who to report to/when. no s/s of infection noted. encouraged patient to get three nutritional meals daily and to stay hydrated, drink plenty of fluids. reviewed cardiac diet- monitoring sodium intake, cholesterol and fat intake. patient aware to limit sodium, no added sodium to diet. reviewed foods to avoid, how to order foods when eating out, how to read nutrition labels and measure sodium, cholesterol and fat intake. discussed importance of monitoring blood pressure daily and recording for review, discussed hypertension, causes/long term effects of uncontrolled hypertension. pt denies any s/s of hypertension at this time- instructed to monitor for blurred vision, strong headaches, fatigue, confusion, etc. patient instructed to monitor daily weight and to report weight gain of 2lbs overnight/5lbs in 1 week to MD. pt/cg encouraged to perform weight check first thing in the morning after first urination, around the same time each day for accuracy. pt aware to use oxygen therapy as prescribed by MD. patient instructed to be very careful when ambulating around oxygen tubing to prevent any falls from occurring. discussed fall precautions in detail- having lighted hallways, removing throw rugs, monitoring medication that may alter mental status.  patient made aware to turn a minimum of every 2 hours and to keep pressure off of bony prominences, to monitor for any pressure ulcer development/worsening. Sharps education provided: na  Patient level of understanding of education provided: patient has a poor understanding of education that was provided at this time (forgetfulness). cg has partial understanding by engaging in all education provided and is able to verbalize understanding, first visit and will require additional education. pt denies any questions or concerns at this time. Skilled Care Performed this visit: pt aware to keep PICC dressing clean, dry and intact as ordered. PICC dressing is currently clean dry and intact, not due for change at this time. discussed step by step IV abx administration with patient/cgs. discussed flushing using SASH method, using aseptic technique. with detailed SN instruction, SN performed IV abx administration and port flushing. each port flushed using SASH method and capped- IV abx infusing with no complications noted. pt/cg is made aware to monitor for any adverse reactions to abx. no adverse reactions noted. cg assisted with prepping saline/heparin flushes on this visit. Patient response to procedure performed:   patient tolerated procedure with no signs of discomfort, grimacing or pain, no complications or concerns noted. Home exercise program/Homework provided: patient instructed to perform sob hep 4-5 x daily and prn for sob, to promote lung expansion. pt also encouraged to use ICS q 2 hours and to perform sob hep during therapy. patient instructed to perform incontinence hep 3-4 x daily to strengthen muscles and to perform timed voiding q 2 hours to prevent incontinence from occurring. Pt/Caregiver instructed on plan of care and are agreeable to plan of care at this time.     Thang Ni MD and Dr. Marilu Dunlpa notified of patient admission to home health and plan of care including anticipated frequency of 3w1, 1w4, 4 prn and treatments/interventions/modalities of IV antibiotics, lab work, PICC care. Discharge planning discussed with patient and caregiver. Discharge planning as follows: Patient will be discharged once education has completed, patient is medically stable and pt/cg are able to independently manage medications and disease process, no longer requires IV abx or PICC line care, weekly labs. Pt/Caregiver did verbalize understanding of discharge planning. Next MD appointment 4/20/22 (date) with Christiana Rutledge MD.  Patient/caregiver encouraged/instructed to keep appointment as lack of follow through with physician appointment could result in discontinuation of home care services due to non-compliance.

## 2022-04-20 ENCOUNTER — APPOINTMENT (OUTPATIENT)
Dept: GENERAL RADIOLOGY | Age: 77
End: 2022-04-20
Attending: PHYSICIAN ASSISTANT
Payer: COMMERCIAL

## 2022-04-20 ENCOUNTER — HOSPITAL ENCOUNTER (EMERGENCY)
Age: 77
Discharge: HOME | End: 2022-04-20
Attending: EMERGENCY MEDICINE
Payer: COMMERCIAL

## 2022-04-20 ENCOUNTER — APPOINTMENT (OUTPATIENT)
Dept: CT IMAGING | Age: 77
End: 2022-04-20
Attending: PHYSICIAN ASSISTANT
Payer: COMMERCIAL

## 2022-04-20 VITALS
OXYGEN SATURATION: 98 % | HEIGHT: 69 IN | SYSTOLIC BLOOD PRESSURE: 110 MMHG | WEIGHT: 177 LBS | HEART RATE: 100 BPM | BODY MASS INDEX: 26.22 KG/M2 | TEMPERATURE: 98.8 F | RESPIRATION RATE: 22 BRPM | DIASTOLIC BLOOD PRESSURE: 70 MMHG

## 2022-04-20 DIAGNOSIS — K29.80 DUODENITIS: Primary | ICD-10-CM

## 2022-04-20 LAB
ALBUMIN SERPL-MCNC: 2.7 G/DL (ref 3.4–5)
ALBUMIN/GLOB SERPL: 0.8 {RATIO} (ref 0.8–1.7)
ALP SERPL-CCNC: 97 U/L (ref 45–117)
ALT SERPL-CCNC: 11 U/L (ref 16–61)
ANION GAP SERPL CALC-SCNC: 5 MMOL/L (ref 3–18)
APPEARANCE UR: CLEAR
APTT PPP: 33.1 SEC (ref 23–36.4)
AST SERPL-CCNC: 9 U/L (ref 10–38)
BASOPHILS # BLD: 0.1 K/UL (ref 0–0.1)
BASOPHILS NFR BLD: 1 % (ref 0–2)
BILIRUB SERPL-MCNC: 0.3 MG/DL (ref 0.2–1)
BILIRUB UR QL: NEGATIVE
BNP SERPL-MCNC: 725 PG/ML (ref 0–1800)
BUN SERPL-MCNC: 19 MG/DL (ref 7–18)
BUN/CREAT SERPL: 14 (ref 12–20)
CALCIUM SERPL-MCNC: 8.1 MG/DL (ref 8.5–10.1)
CHLORIDE SERPL-SCNC: 106 MMOL/L (ref 100–111)
CO2 SERPL-SCNC: 28 MMOL/L (ref 21–32)
COLOR UR: YELLOW
CREAT SERPL-MCNC: 1.32 MG/DL (ref 0.6–1.3)
DIFFERENTIAL METHOD BLD: ABNORMAL
EOSINOPHIL # BLD: 0.6 K/UL (ref 0–0.4)
EOSINOPHIL NFR BLD: 8 % (ref 0–5)
ERYTHROCYTE [DISTWIDTH] IN BLOOD BY AUTOMATED COUNT: 15.3 % (ref 11.6–14.5)
GLOBULIN SER CALC-MCNC: 3.4 G/DL (ref 2–4)
GLUCOSE SERPL-MCNC: 114 MG/DL (ref 74–99)
GLUCOSE UR STRIP.AUTO-MCNC: NEGATIVE MG/DL
HCT VFR BLD AUTO: 24.7 % (ref 36–48)
HGB BLD-MCNC: 8.1 G/DL (ref 13–16)
HGB UR QL STRIP: NEGATIVE
IMM GRANULOCYTES # BLD AUTO: 0.1 K/UL (ref 0–0.04)
IMM GRANULOCYTES NFR BLD AUTO: 1 % (ref 0–0.5)
INR PPP: 1.1 (ref 0.8–1.2)
KETONES UR QL STRIP.AUTO: NEGATIVE MG/DL
LACTATE BLD-SCNC: 0.71 MMOL/L (ref 0.4–2)
LEUKOCYTE ESTERASE UR QL STRIP.AUTO: NEGATIVE
LIPASE SERPL-CCNC: 90 U/L (ref 73–393)
LYMPHOCYTES # BLD: 1 K/UL (ref 0.9–3.6)
LYMPHOCYTES NFR BLD: 13 % (ref 21–52)
MAGNESIUM SERPL-MCNC: 1.9 MG/DL (ref 1.6–2.6)
MCH RBC QN AUTO: 28.7 PG (ref 24–34)
MCHC RBC AUTO-ENTMCNC: 32.8 G/DL (ref 31–37)
MCV RBC AUTO: 87.6 FL (ref 78–100)
MONOCYTES # BLD: 0.5 K/UL (ref 0.05–1.2)
MONOCYTES NFR BLD: 7 % (ref 3–10)
NEUTS SEG # BLD: 6 K/UL (ref 1.8–8)
NEUTS SEG NFR BLD: 72 % (ref 40–73)
NITRITE UR QL STRIP.AUTO: NEGATIVE
NRBC # BLD: 0 K/UL (ref 0–0.01)
NRBC BLD-RTO: 0 PER 100 WBC
PH UR STRIP: 7 [PH] (ref 5–8)
PLATELET # BLD AUTO: 304 K/UL (ref 135–420)
PMV BLD AUTO: 8.9 FL (ref 9.2–11.8)
POTASSIUM SERPL-SCNC: 3.8 MMOL/L (ref 3.5–5.5)
PROT SERPL-MCNC: 6.1 G/DL (ref 6.4–8.2)
PROT UR STRIP-MCNC: NEGATIVE MG/DL
PROTHROMBIN TIME: 13.4 SEC (ref 11.5–15.2)
RBC # BLD AUTO: 2.82 M/UL (ref 4.35–5.65)
SODIUM SERPL-SCNC: 139 MMOL/L (ref 136–145)
SP GR UR REFRACTOMETRY: 1.01 (ref 1–1.03)
TROPONIN-HIGH SENSITIVITY: 4 NG/L (ref 0–78)
UROBILINOGEN UR QL STRIP.AUTO: 0.2 EU/DL (ref 0.2–1)
WBC # BLD AUTO: 8.3 K/UL (ref 4.6–13.2)

## 2022-04-20 PROCEDURE — 87040 BLOOD CULTURE FOR BACTERIA: CPT

## 2022-04-20 PROCEDURE — 96375 TX/PRO/DX INJ NEW DRUG ADDON: CPT

## 2022-04-20 PROCEDURE — 74177 CT ABD & PELVIS W/CONTRAST: CPT

## 2022-04-20 PROCEDURE — 81003 URINALYSIS AUTO W/O SCOPE: CPT

## 2022-04-20 PROCEDURE — 83605 ASSAY OF LACTIC ACID: CPT

## 2022-04-20 PROCEDURE — 74011250636 HC RX REV CODE- 250/636: Performed by: PHYSICIAN ASSISTANT

## 2022-04-20 PROCEDURE — 83880 ASSAY OF NATRIURETIC PEPTIDE: CPT

## 2022-04-20 PROCEDURE — 71045 X-RAY EXAM CHEST 1 VIEW: CPT

## 2022-04-20 PROCEDURE — 85730 THROMBOPLASTIN TIME PARTIAL: CPT

## 2022-04-20 PROCEDURE — 80053 COMPREHEN METABOLIC PANEL: CPT

## 2022-04-20 PROCEDURE — 3331090002 HH PPS REVENUE DEBIT

## 2022-04-20 PROCEDURE — 74011000636 HC RX REV CODE- 636: Performed by: PHYSICIAN ASSISTANT

## 2022-04-20 PROCEDURE — 85025 COMPLETE CBC W/AUTO DIFF WBC: CPT

## 2022-04-20 PROCEDURE — 3331090001 HH PPS REVENUE CREDIT

## 2022-04-20 PROCEDURE — 83735 ASSAY OF MAGNESIUM: CPT

## 2022-04-20 PROCEDURE — 85610 PROTHROMBIN TIME: CPT

## 2022-04-20 PROCEDURE — 74011000636 HC RX REV CODE- 636: Performed by: EMERGENCY MEDICINE

## 2022-04-20 PROCEDURE — C9113 INJ PANTOPRAZOLE SODIUM, VIA: HCPCS | Performed by: PHYSICIAN ASSISTANT

## 2022-04-20 PROCEDURE — 84484 ASSAY OF TROPONIN QUANT: CPT

## 2022-04-20 PROCEDURE — 83690 ASSAY OF LIPASE: CPT

## 2022-04-20 PROCEDURE — 96374 THER/PROPH/DIAG INJ IV PUSH: CPT

## 2022-04-20 PROCEDURE — 99285 EMERGENCY DEPT VISIT HI MDM: CPT

## 2022-04-20 PROCEDURE — 93005 ELECTROCARDIOGRAM TRACING: CPT

## 2022-04-20 RX ORDER — MORPHINE SULFATE 4 MG/ML
4 INJECTION INTRAVENOUS
Status: COMPLETED | OUTPATIENT
Start: 2022-04-20 | End: 2022-04-20

## 2022-04-20 RX ORDER — ONDANSETRON 2 MG/ML
4 INJECTION INTRAMUSCULAR; INTRAVENOUS
Status: COMPLETED | OUTPATIENT
Start: 2022-04-20 | End: 2022-04-20

## 2022-04-20 RX ORDER — PANTOPRAZOLE SODIUM 40 MG/10ML
40 INJECTION, POWDER, LYOPHILIZED, FOR SOLUTION INTRAVENOUS
Status: COMPLETED | OUTPATIENT
Start: 2022-04-20 | End: 2022-04-20

## 2022-04-20 RX ORDER — SUCRALFATE 1 G/1
1 TABLET ORAL 4 TIMES DAILY
Qty: 20 TABLET | Refills: 0 | Status: SHIPPED | OUTPATIENT
Start: 2022-04-20 | End: 2022-04-25

## 2022-04-20 RX ADMIN — PANTOPRAZOLE SODIUM 40 MG: 40 INJECTION, POWDER, FOR SOLUTION INTRAVENOUS at 17:04

## 2022-04-20 RX ADMIN — SODIUM CHLORIDE, POTASSIUM CHLORIDE, SODIUM LACTATE AND CALCIUM CHLORIDE 1000 ML: 600; 310; 30; 20 INJECTION, SOLUTION INTRAVENOUS at 17:19

## 2022-04-20 RX ADMIN — ONDANSETRON 4 MG: 2 INJECTION INTRAMUSCULAR; INTRAVENOUS at 17:04

## 2022-04-20 RX ADMIN — IOPAMIDOL 100 ML: 612 INJECTION, SOLUTION INTRAVENOUS at 20:03

## 2022-04-20 RX ADMIN — IOHEXOL: 240 INJECTION, SOLUTION INTRATHECAL; INTRAVASCULAR; INTRAVENOUS; ORAL at 17:24

## 2022-04-20 RX ADMIN — MORPHINE SULFATE 4 MG: 4 INJECTION INTRAVENOUS at 17:04

## 2022-04-20 NOTE — ED PROVIDER NOTES
EMERGENCY DEPARTMENT HISTORY AND PHYSICAL EXAM    Date: 4/20/2022  Patient Name: Rosemarie Crowley    History of Presenting Illness     Chief Complaint   Patient presents with    Abdominal Pain         History Provided By: Patient    Chief Complaint: abdominal pain    HPI(Context):   4:37 PM  Rosemarie Crowley is a 64 y.o. male with PMHX of HTN, COPD, CKD, CHF, PAFIB, hard of hearing, who presents to the emergency department via EMS c/o LUQ abdominal pain. Associated sxs include nausea. Sxs x today. Pt was discharged from this facility 5 days ago for duodenitis with duodenal ulcers, bacteremia with MRSA. Pt notes pain is aching. No radiation. Pt has home health giving IV ABX at home. Pt denies fever, chills, vomiting, diarrhea, constipation, melena, hematochezia, and any other sxs or complaints. PCP: Camilo Rdz MD    Current Outpatient Medications   Medication Sig Dispense Refill    sucralfate (Carafate) 1 gram tablet Take 1 Tablet by mouth four (4) times daily for 5 days. Take one hour before meals up to three times daily  Indications: an ulcer of the duodenum 20 Tablet 0    DAPTOMYCIN  mg by IntraVENous route daily. in 25mL NS      sodium chloride (Normal Saline Flush) 5-10 mL by IntraVENous route daily. flush IV catheter with 10ml before and after infusing each dose of medication as directed.  heparin sodium,porcine (HEPARIN LOCK FLUSH IV) 3 mL by IntraVENous route daily. flush IV catheter with 3ml of heparin 10units/mL after the last dose of 0.9% sodium chloride flush, after each dose of medication or as directed. flush IV catheter every day if not in use.  albuterol (PROVENTIL HFA, VENTOLIN HFA, PROAIR HFA) 90 mcg/actuation inhaler Take 2 Puffs by inhalation every four (4) hours as needed for Wheezing or Shortness of Breath. 1 Each 3    ferrous sulfate 325 mg (65 mg iron) tablet Take 1 Tablet by mouth two (2) times daily (with meals).  60 Tablet 3    apixaban (ELIQUIS) 5 mg tablet Take 1 Tablet by mouth two (2) times a day. 60 Tablet 2    arformoteroL (BROVANA) 15 mcg/2 mL nebu neb solution 2 mL by Nebulization route two (2) times a day for 30 days. 120 mL 3    budesonide (PULMICORT) 0.5 mg/2 mL nbsp 2 mL by Nebulization route two (2) times a day for 30 days. 60 Each 3    tamsulosin (FLOMAX) 0.4 mg capsule Take 1 Capsule by mouth every evening. 30 Capsule 3    furosemide (Lasix) 20 mg tablet Take 1 Tablet by mouth daily. 30 Tablet 3    amLODIPine (NORVASC) 10 mg tablet Take 1 Tablet by mouth daily. 30 Tablet 3    albuterol-ipratropium (DUO-NEB) 2.5 mg-0.5 mg/3 ml nebu 3 mL by Nebulization route every four (4) hours as needed for Wheezing. 30 Nebule 3    pantoprazole (PROTONIX) 40 mg tablet Take 1 Tablet by mouth Before breakfast and dinner. 60 Tablet 0    OXYGEN-AIR DELIVERY SYSTEMS 2 L/min by Nasal route daily as needed for PRN Reason (Other) (SOB/wheezing).  dextran 70/hypromellose (ARTIFICIAL TEARS, PF, OP) Administer 2 Drops to both eyes daily as needed for Other (dry eyes).          Past History     Past Medical History:  Past Medical History:   Diagnosis Date    BMI 30.0-30.9,adult 4/2/2022    Brain aneurysm     Brain aneurysm     Cancer [facility])     prostate    CHF (congestive heart failure) (HCC)     CKD (chronic kidney disease)     Closed nondisplaced supracondylar fracture of lower end of left femur without intracondylar extension with delayed healing     COPD (chronic obstructive pulmonary disease) ([address]     Debility     History of home oxygen therapy     [place] (hard of hearing)     Hypertension     Nocturia     On home oxygen therapy     PAF (paroxysmal atrial fibrillation) (HCC)     Pneumonia     Prostate CA ([address]     Prostate neoplasm     Radiation effect        Past Surgical History:  Past Surgical History:   Procedure Laterality Date    HX HERNIA REPAIR      HX HIP ARTHROSCOPY  04/09/2021       Family History:  Family History   Problem Relation Age of Onset    Heart Disease Mother        Social History:  Social History     Tobacco Use    Smoking status: Former Smoker     Types: Cigarettes    Smokeless tobacco: Never Used   Substance Use Topics    Alcohol use: No    Drug use: Never       Allergies: Allergies   Allergen Reactions    Aspirin Other (comments)     \"messes my stomach up\"    Bactrim [Sulfamethoxazole-Trimethoprim] Unknown (comments)    Nsaids (Non-Steroidal Anti-Inflammatory Drug) Unknown (comments)         Review of Systems   Review of Systems   Constitutional: Negative for chills and fever. Respiratory: Negative for cough. Cardiovascular: Negative for chest pain. Gastrointestinal: Positive for abdominal pain. Negative for blood in stool (no melena), constipation, diarrhea, nausea and vomiting. Genitourinary: Negative for dysuria. All other systems reviewed and are negative. Physical Exam     Vitals:    04/20/22 1830 04/20/22 1845 04/20/22 1916 04/20/22 1931   BP: (!) 112/55  109/73    Pulse: (!) 104 (!) 104  (!) 101   Resp: 23 21 23    Temp:       SpO2: 99% 96% 96%    Weight:       Height:         Physical Exam  Vitals and nursing note reviewed. Constitutional:       General: He is not in acute distress. Appearance: He is well-developed. He is not diaphoretic. Comments: Geriatric adult male that appears uncomfortable. Alert   HENT:      Head: Normocephalic and atraumatic. Right Ear: External ear normal.      Left Ear: External ear normal.      Nose: Nose normal.      Mouth/Throat:      Mouth: Mucous membranes are dry. Eyes:      General: No scleral icterus. Right eye: No discharge. Left eye: No discharge. Conjunctiva/sclera: Conjunctivae normal.   Cardiovascular:      Rate and Rhythm: Normal rate and regular rhythm. Heart sounds: Normal heart sounds. No murmur heard. No friction rub. No gallop.     Pulmonary:      Effort: Pulmonary effort is normal. No tachypnea, accessory muscle usage or respiratory distress. Breath sounds: Normal breath sounds. No decreased breath sounds, wheezing, rhonchi or rales. Abdominal:      General: Bowel sounds are normal. There is no distension. Tenderness: There is abdominal tenderness in the left upper quadrant. There is no right CVA tenderness, left CVA tenderness, guarding or rebound. Negative signs include McBurney's sign. Musculoskeletal:         General: Normal range of motion. Cervical back: Normal range of motion. Skin:     General: Skin is warm and dry. Neurological:      Mental Status: He is alert and oriented to person, place, and time. Psychiatric:         Judgment: Judgment normal.             Diagnostic Study Results     Labs -     Recent Results (from the past 12 hour(s))   EKG, 12 LEAD, INITIAL    Collection Time: 04/20/22  4:35 PM   Result Value Ref Range    Ventricular Rate 103 BPM    QRS Duration 82 ms    Q-T Interval 346 ms    QTC Calculation (Bezet) 453 ms    Calculated R Axis 52 degrees    Calculated T Axis 57 degrees    Diagnosis       Atrial fibrillation with rapid ventricular response  Abnormal ECG  When compared with ECG of 01-APR-2022 20:00,  Atrial fibrillation has replaced Sinus rhythm     CBC WITH AUTOMATED DIFF    Collection Time: 04/20/22  4:53 PM   Result Value Ref Range    WBC 8.3 4.6 - 13.2 K/uL    RBC 2.82 (L) 4.35 - 5.65 M/uL    HGB 8.1 (L) 13.0 - 16.0 g/dL    HCT 24.7 (L) 36.0 - 48.0 %    MCV 87.6 78.0 - 100.0 FL    MCH 28.7 24.0 - 34.0 PG    MCHC 32.8 31.0 - 37.0 g/dL    RDW 15.3 (H) 11.6 - 14.5 %    PLATELET 947 338 - 348 K/uL    MPV 8.9 (L) 9.2 - 11.8 FL    NRBC 0.0 0  WBC    ABSOLUTE NRBC 0.00 0.00 - 0.01 K/uL    NEUTROPHILS 72 40 - 73 %    LYMPHOCYTES 13 (L) 21 - 52 %    MONOCYTES 7 3 - 10 %    EOSINOPHILS 8 (H) 0 - 5 %    BASOPHILS 1 0 - 2 %    IMMATURE GRANULOCYTES 1 (H) 0.0 - 0.5 %    ABS. NEUTROPHILS 6.0 1.8 - 8.0 K/UL    ABS. LYMPHOCYTES 1.0 0.9 - 3.6 K/UL    ABS.  MONOCYTES 0.5 0.05 - 1.2 K/UL    ABS. EOSINOPHILS 0.6 (H) 0.0 - 0.4 K/UL    ABS. BASOPHILS 0.1 0.0 - 0.1 K/UL    ABS. IMM. GRANS. 0.1 (H) 0.00 - 0.04 K/UL    DF AUTOMATED     METABOLIC PANEL, COMPREHENSIVE    Collection Time: 04/20/22  4:53 PM   Result Value Ref Range    Sodium 139 136 - 145 mmol/L    Potassium 3.8 3.5 - 5.5 mmol/L    Chloride 106 100 - 111 mmol/L    CO2 28 21 - 32 mmol/L    Anion gap 5 3.0 - 18 mmol/L    Glucose 114 (H) 74 - 99 mg/dL    BUN 19 (H) 7.0 - 18 MG/DL    Creatinine 1.32 (H) 0.6 - 1.3 MG/DL    BUN/Creatinine ratio 14 12 - 20      GFR est AA >60 >60 ml/min/1.73m2    GFR est non-AA 52 (L) >60 ml/min/1.73m2    Calcium 8.1 (L) 8.5 - 10.1 MG/DL    Bilirubin, total 0.3 0.2 - 1.0 MG/DL    ALT (SGPT) 11 (L) 16 - 61 U/L    AST (SGOT) 9 (L) 10 - 38 U/L    Alk.  phosphatase 97 45 - 117 U/L    Protein, total 6.1 (L) 6.4 - 8.2 g/dL    Albumin 2.7 (L) 3.4 - 5.0 g/dL    Globulin 3.4 2.0 - 4.0 g/dL    A-G Ratio 0.8 0.8 - 1.7     LIPASE    Collection Time: 04/20/22  4:53 PM   Result Value Ref Range    Lipase 90 73 - 393 U/L   PROTHROMBIN TIME + INR    Collection Time: 04/20/22  4:53 PM   Result Value Ref Range    Prothrombin time 13.4 11.5 - 15.2 sec    INR 1.1 0.8 - 1.2     PTT    Collection Time: 04/20/22  4:53 PM   Result Value Ref Range    aPTT 33.1 23.0 - 36.4 SEC   TROPONIN-HIGH SENSITIVITY    Collection Time: 04/20/22  4:53 PM   Result Value Ref Range    Troponin-High Sensitivity 4 0 - 78 ng/L   NT-PRO BNP    Collection Time: 04/20/22  4:53 PM   Result Value Ref Range    NT pro- 0 - 1,800 PG/ML   MAGNESIUM    Collection Time: 04/20/22  4:53 PM   Result Value Ref Range    Magnesium 1.9 1.6 - 2.6 mg/dL   POC LACTIC ACID    Collection Time: 04/20/22  5:24 PM   Result Value Ref Range    Lactic Acid (POC) 0.71 0.40 - 2.00 mmol/L   URINALYSIS W/ RFLX MICROSCOPIC    Collection Time: 04/20/22  5:25 PM   Result Value Ref Range    Color YELLOW      Appearance CLEAR      Specific gravity 1.007 1.005 - 1.030      pH (UA) 7.0 5.0 - 8.0      Protein Negative NEG mg/dL    Glucose Negative NEG mg/dL    Ketone Negative NEG mg/dL    Bilirubin Negative NEG      Blood Negative NEG      Urobilinogen 0.2 0.2 - 1.0 EU/dL    Nitrites Negative NEG      Leukocyte Esterase Negative NEG         Radiologic Studies    CT ABD PELV W CONT   Final Result      1. Substantial improvement in previous duodenal wall thickening and surrounding   inflammation with mild residual wall thickening medial duodenum with stable   either calcific or other high density within medial wall duodenum possibly   within diverticulum. 2. No other new acute abdominal or pelvic CT finding. 3. Prominent residual stool with mildly distended stool-filled rectum. 4. Chronic benign-appearing peripheral calcified pleural lesions right lower   hemithorax, minimal left pleural effusion. XR CHEST PORT   Final Result      1. Stable atelectasis infiltrate or scarring right lower lung with no evidence   of new active cardiopulmonary disease. 2. Stable chronic areas of pleural calcification and chronic right apical   scarring. CT Results  (Last 48 hours)               04/20/22 2004  CT ABD PELV W CONT Final result    Impression:      1. Substantial improvement in previous duodenal wall thickening and surrounding   inflammation with mild residual wall thickening medial duodenum with stable   either calcific or other high density within medial wall duodenum possibly   within diverticulum. 2. No other new acute abdominal or pelvic CT finding. 3. Prominent residual stool with mildly distended stool-filled rectum. 4. Chronic benign-appearing peripheral calcified pleural lesions right lower   hemithorax, minimal left pleural effusion. Narrative:  EXAM:CT abdomen pelvis with contrast       CLINICAL INDICATION/HISTORY: Abdominal pain       COMPARISON: 4/6/2022.        TECHNIQUE: Standard helical CT performed through the abdomen and pelvis with   oral and IV contrast.  Coronal and sagittal reformations were generated. One or   more dose reduction techniques were used on this CT: automated exposure control,   adjustment of the mAs and/or kVp according to patient's size, and iterative   reconstruction techniques. The specific techniques utilized on this CT exam have   been documented in the patient's electronic medical record. Digital imaging and   communications and medicine (DICOM) format image data are available to   nonaffiliated external healthcare facilities or entities on a secure, media   free, reciprocally searchable basis with patient authorization for at least a 12   month period after this study. _______________       FINDINGS:       INFERIOR THORAX: Chronic benign-appearing peripheral calcified pleural lesions   anterior and posterior right lower hemithorax with chronic posterior right   pleural thickening. Minimal left pleural effusion. Normal size heart. LIVER/BILIARY: Numerous calcified granulomas with no new focal intrinsic liver   lesion. No biliary dilation. Gallbladder unremarkable. SPLEEN: Normal.       PANCREAS: Interval resolution previous stranding surrounding pancreatic head   with no new pancreatic abnormality. ADRENALS: Normal.       KIDNEYS: Benign-appearing bilateral renal cysts with chronic nonspecific   bilateral perirenal stranding. No new renal mass calculus or obstruction. Bladder nondistended but unremarkable. LYMPH NODES: No technically enlarged nodes. GI TRACT: Substantial improvement in wall thickening and surrounding stranding   involving descending duodenum with mild residual medial descending duodenal wall   thickening. Persistent short band of either calcification or other high density   material along medial wall descending duodenum. Prominent residual stool. Mildly   distended stool-filled rectum.  No other abnormal small or large bowel dilatation   or wall thickening. Normal appendix. VASCULATURE: Mild aortoiliac atherosclerotic calcification without aneurysm. PELVIC ORGANS: Unremarkable. OTHER: Chronic superior endplate fracture S1 vertebral body with mild loss of   height and sclerosis. Total left hip prosthesis with moderate right hip joint   space narrowing. Multilevel lower lumbar degenerative spondylosis with no new   acute osseous findings. _______________               CXR Results  (Last 48 hours)               04/20/22 1700  XR CHEST PORT Final result    Impression:      1. Stable atelectasis infiltrate or scarring right lower lung with no evidence   of new active cardiopulmonary disease. 2. Stable chronic areas of pleural calcification and chronic right apical   scarring. Narrative:  EXAM: Portable frontal view of the chest.       CLINICAL INDICATION/HISTORY: Shortness of breath       COMPARISON: 4/7/2022, correlation chest CT 2/3/2022       _______________       FINDINGS:        Normal mediastinal and cardiac silhouette. Right arm PICC line tip is in the   SVC. Stable benign appearing peripherally calcified lesion right lower   hemithorax as well as lateral calcification right base. Persistent hazy   indistinct opacity right lower lung and some chronic right apical scarring.  No   new consolidation mass pleural effusion or pneumothorax.       _______________                 Medications given in the ED-  Medications   pantoprazole (PROTONIX) injection 40 mg (40 mg IntraVENous Given 4/20/22 1704)   morphine injection 4 mg (4 mg IntraVENous Given 4/20/22 1704)   ondansetron (ZOFRAN) injection 4 mg (4 mg IntraVENous Given 4/20/22 1704)   lactated ringers bolus infusion 1,000 mL (1,000 mL IntraVENous New Bag 4/20/22 1719)   iohexol (OMNIPAQUE) ORAL mixture ( Oral Given 4/20/22 1724)   iopamidoL (ISOVUE 300) 61 % contrast injection 100 mL (100 mL IntraVENous Given 4/20/22 2003)         Medical Decision Making   I am the first provider for this patient. I reviewed the vital signs, available nursing notes, past medical history, past surgical history, family history and social history. Vital Signs-Reviewed the patient's vital signs. Pulse Oximetry Analysis - 96% on RA. NORMAL     Records Reviewed: Nursing Notes, Old Medical Records, Previous electrocardiograms, Previous Radiology Studies and Previous Laboratory Studies    Provider Notes (Medical Decision Making): perforation, gastritis, duodenitis, pancreatitis, hepatitis, GERD, PUD, colitis,     Procedures:  Procedures    ED Course:   4:37 PM Initial assessment performed. The patients presenting problems have been discussed, and they are in agreement with the care plan formulated and outlined with them. I have encouraged them to ask questions as they arise throughout their visit. Diagnosis and Disposition       Afebrile. Pain resolved. CT with PO and IV contrast reveals no perf with significant improvement in duodenitis. Labs improved including normal lactate. Discussed importance of taking PPI. Will add Carafate one hour PTA. FU as scheduled. Reasons to RTED discussed with pt. All questions answered. Pt feels comfortable going home at this time. Pt expressed understanding and he agrees with plan. 1. Duodenitis        PLAN:  1. D/C Home  2. Current Discharge Medication List      START taking these medications    Details   sucralfate (Carafate) 1 gram tablet Take 1 Tablet by mouth four (4) times daily for 5 days. Take one hour before meals up to three times daily  Indications: an ulcer of the duodenum  Qty: 20 Tablet, Refills: 0  Start date: 4/20/2022, End date: 4/25/2022           3.    Follow-up Information     Follow up With Specialties Details Why Contact Info    Scott Jain MD Family Medicine   [address] [place] [address]  [phone]      [facility] EMERGENCY DEPT Emergency Medicine   [address] [place] [id number] [phone] _______________________________    Attestations: This note is prepared by Abelardo Cooper PA-C.  _______________________________          Please note that this dictation was completed with [employer], the computer voice recognition software. Quite often unanticipated grammatical, syntax, homophones, and other interpretive errors are inadvertently transcribed by the computer software. Please disregard these errors. Please excuse any errors that have escaped final proofreading.

## 2022-04-20 NOTE — ED NOTES
Straight stick to left AC completed  BC x 1 walked to lab  POC lactic completed  Pt medicated per [facility]

## 2022-04-21 ENCOUNTER — HOME CARE VISIT (OUTPATIENT)
Dept: HOME HEALTH SERVICES | Facility: HOME HEALTH | Age: 77
End: 2022-04-21
Payer: MEDICARE

## 2022-04-21 PROCEDURE — 3331090002 HH PPS REVENUE DEBIT

## 2022-04-21 PROCEDURE — 3331090001 HH PPS REVENUE CREDIT

## 2022-04-21 NOTE — ED NOTES
Discharge instructions provided to patient. Verbalized understanding. Alert and oriented. Wife called and updated regarding discharge. Wife to have someone come pick patient up. Patient escorted out of ED to waiting room via w/c to wait for ride.

## 2022-04-22 PROCEDURE — 3331090001 HH PPS REVENUE CREDIT

## 2022-04-22 PROCEDURE — 3331090002 HH PPS REVENUE DEBIT

## 2022-04-23 PROCEDURE — 3331090002 HH PPS REVENUE DEBIT

## 2022-04-23 PROCEDURE — 3331090001 HH PPS REVENUE CREDIT

## 2022-04-24 LAB
CALCULATED R AXIS, ECG10: 52 DEGREES
CALCULATED T AXIS, ECG11: 57 DEGREES
DIAGNOSIS, 93000: NORMAL
Q-T INTERVAL, ECG07: 346 MS
QRS DURATION, ECG06: 82 MS
QTC CALCULATION (BEZET), ECG08: 453 MS
VENTRICULAR RATE, ECG03: 103 BPM

## 2022-04-24 PROCEDURE — 3331090001 HH PPS REVENUE CREDIT

## 2022-04-24 PROCEDURE — 3331090002 HH PPS REVENUE DEBIT

## 2022-04-25 ENCOUNTER — HOME CARE VISIT (OUTPATIENT)
Dept: SCHEDULING | Facility: HOME HEALTH | Age: 77
End: 2022-04-25
Payer: MEDICARE

## 2022-04-25 ENCOUNTER — HOSPITAL ENCOUNTER (OUTPATIENT)
Dept: LAB | Age: 77
Discharge: HOME | End: 2022-04-25
Payer: COMMERCIAL

## 2022-04-25 ENCOUNTER — PATIENT OUTREACH (OUTPATIENT)
Dept: CASE MANAGEMENT | Age: 77
End: 2022-04-25

## 2022-04-25 LAB
ANION GAP SERPL CALC-SCNC: 2 MMOL/L (ref 3–18)
BASOPHILS # BLD: 0.1 K/UL (ref 0–0.1)
BASOPHILS NFR BLD: 1 % (ref 0–2)
BUN SERPL-MCNC: 22 MG/DL (ref 7–18)
BUN/CREAT SERPL: 16 (ref 12–20)
CALCIUM SERPL-MCNC: 7.9 MG/DL (ref 8.5–10.1)
CHLORIDE SERPL-SCNC: 107 MMOL/L (ref 100–111)
CK SERPL-CCNC: 63 U/L (ref 39–308)
CO2 SERPL-SCNC: 30 MMOL/L (ref 21–32)
CREAT SERPL-MCNC: 1.37 MG/DL (ref 0.6–1.3)
DIFFERENTIAL METHOD BLD: ABNORMAL
EOSINOPHIL # BLD: 1 K/UL (ref 0–0.4)
EOSINOPHIL NFR BLD: 10 % (ref 0–5)
ERYTHROCYTE [DISTWIDTH] IN BLOOD BY AUTOMATED COUNT: 15.2 % (ref 11.6–14.5)
GLUCOSE SERPL-MCNC: 126 MG/DL (ref 74–99)
HCT VFR BLD AUTO: 25.6 % (ref 36–48)
HGB BLD-MCNC: 7.9 G/DL (ref 13–16)
IMM GRANULOCYTES # BLD AUTO: 0.1 K/UL (ref 0–0.04)
IMM GRANULOCYTES NFR BLD AUTO: 1 % (ref 0–0.5)
LYMPHOCYTES # BLD: 0.9 K/UL (ref 0.9–3.6)
LYMPHOCYTES NFR BLD: 9 % (ref 21–52)
MCH RBC QN AUTO: 28.2 PG (ref 24–34)
MCHC RBC AUTO-ENTMCNC: 30.9 G/DL (ref 31–37)
MCV RBC AUTO: 91.4 FL (ref 78–100)
MONOCYTES # BLD: 0.7 K/UL (ref 0.05–1.2)
MONOCYTES NFR BLD: 7 % (ref 3–10)
NEUTS SEG # BLD: 7.6 K/UL (ref 1.8–8)
NEUTS SEG NFR BLD: 72 % (ref 40–73)
NRBC # BLD: 0 K/UL (ref 0–0.01)
NRBC BLD-RTO: 0 PER 100 WBC
PLATELET # BLD AUTO: 312 K/UL (ref 135–420)
PLATELET COMMENTS,PCOM: ABNORMAL
PMV BLD AUTO: 9 FL (ref 9.2–11.8)
POTASSIUM SERPL-SCNC: 4.2 MMOL/L (ref 3.5–5.5)
RBC # BLD AUTO: 2.8 M/UL (ref 4.35–5.65)
RBC MORPH BLD: ABNORMAL
SODIUM SERPL-SCNC: 139 MMOL/L (ref 136–145)
WBC # BLD AUTO: 10.4 K/UL (ref 4.6–13.2)

## 2022-04-25 PROCEDURE — G0299 HHS/HOSPICE OF RN EA 15 MIN: HCPCS

## 2022-04-25 PROCEDURE — 3331090002 HH PPS REVENUE DEBIT

## 2022-04-25 PROCEDURE — 80048 BASIC METABOLIC PNL TOTAL CA: CPT

## 2022-04-25 PROCEDURE — 82550 ASSAY OF CK (CPK): CPT

## 2022-04-25 PROCEDURE — 85025 COMPLETE CBC W/AUTO DIFF WBC: CPT

## 2022-04-25 PROCEDURE — 3331090001 HH PPS REVENUE CREDIT

## 2022-04-25 NOTE — PROGRESS NOTES
Care Transitions Follow Up Call    Challenges to be reviewed by the provider   Additional needs identified to be addressed with provider: no  none           Method of communication with provider : none    Care Transition Nurse (CTN) contacted the patient by telephone to follow up after admission on 2022 to 4/15/2022. Verified name and  with patient as identifiers. Addressed changes since last contact: none  Follow up appointment completed? yes. Was follow up appointment scheduled within 7 days of discharge? yes. Advance Care Planning:   Does patient have an Advance Directive:  yes; reviewed and current     CTN reviewed discharge instructions, medical action plan and red flags with patient and discussed any barriers to care and/or understanding of plan of care after discharge. Discussed appropriate site of care based on symptoms and resources available to patient including: PCP, Specialist, [address] [place] [address] and [facility]. The patient agrees to contact the PCP office for questions related to their healthcare. Patients top risk factors for readmission: medical condition-abdominal pain and support system   Interventions to address risk factors: Scheduled appointment with PCP-[facility] follow up appointment(s):   Future Appointments   Date Time Provider Mike Lamar   2022 To Be Determined Macarena Blakely RN [address] [place] [address],  [id number]   2022 To Be Determined Macarena Blakely RN [address] [facility]   2022 To Be Determined Macarena Blakely RN [address] [facility] follow up appointment(s): n/a    CTN provided contact information for future needs. Plan for follow-up call in 5-7 days based on severity of symptoms and risk factors. Plan for next call: medication management-ensure that patient has all meds, is taking and knows when to call physician with issues. Goals Addressed                 This Visit's Progress     Prevent complications post hospitalization.         1. CTN will monitor X 4 weeks    2. Ensure provider appt is scheduled within 7 days post-discharge 4/18/2022 Patient has PCP appt 4/20 at 10:30 am    3. Confirm patient attended post-discharge provider apt    4. Complete post-visit call to confirm attendance and update care needs  4/18/2022 Patient is feeling much better this am. He is eating and drinking well. 4/25/2022 Patient is getting stronger each day. He is eating and drinking well. No care needs noted. 5. Review/educate common or potential \"red flags\" of condition worsening 4/18/2022 Reviewed signs/symptoms of MRSA bacteremia such as red area, swollen, painful, warm to touch, accompanied by fever to name a few. 6. Evaluate adherence to medications and priority barriers to resolve  4/18/2022 Patient has all meds and is taking as directed. 4/25/2022 Patient has meds and is taking as prescribed. 7. Instruct on adherence to medications as ordered and assess for therapeutic response and side-effects  4/18/2022 patient  knows why he is taking meds and knows when to call physician with any issues. 4/25/2022 No concerns about meds. 8. Discuss and evaluate ADL performance. Provide recommendations on energy conservation, particularly related to transition home from an inpatient admission. 4/18/2022 Patient is using walker at present. He does what he can and rests as needed. 4/25/2022 Patient continues to have [place]. He is using walker for mobility. He rests as needed between activities.

## 2022-04-26 ENCOUNTER — HOME CARE VISIT (OUTPATIENT)
Dept: HOME HEALTH SERVICES | Facility: HOME HEALTH | Age: 77
End: 2022-04-26
Payer: MEDICARE

## 2022-04-26 VITALS
DIASTOLIC BLOOD PRESSURE: 60 MMHG | RESPIRATION RATE: 18 BRPM | OXYGEN SATURATION: 95 % | TEMPERATURE: 98 F | HEART RATE: 74 BPM | SYSTOLIC BLOOD PRESSURE: 140 MMHG

## 2022-04-26 PROCEDURE — 3331090001 HH PPS REVENUE CREDIT

## 2022-04-26 PROCEDURE — 3331090002 HH PPS REVENUE DEBIT

## 2022-04-27 PROCEDURE — 3331090001 HH PPS REVENUE CREDIT

## 2022-04-27 PROCEDURE — 3331090002 HH PPS REVENUE DEBIT

## 2022-04-28 PROCEDURE — 3331090001 HH PPS REVENUE CREDIT

## 2022-04-28 PROCEDURE — 3331090002 HH PPS REVENUE DEBIT

## 2022-04-29 PROCEDURE — 3331090002 HH PPS REVENUE DEBIT

## 2022-04-29 PROCEDURE — 3331090001 HH PPS REVENUE CREDIT

## 2022-04-30 PROCEDURE — 3331090001 HH PPS REVENUE CREDIT

## 2022-04-30 PROCEDURE — 3331090002 HH PPS REVENUE DEBIT

## 2022-05-01 PROCEDURE — 3331090002 HH PPS REVENUE DEBIT

## 2022-05-01 PROCEDURE — 3331090001 HH PPS REVENUE CREDIT

## 2022-05-02 ENCOUNTER — HOME CARE VISIT (OUTPATIENT)
Dept: SCHEDULING | Facility: HOME HEALTH | Age: 77
End: 2022-05-02
Payer: MEDICARE

## 2022-05-02 ENCOUNTER — PATIENT OUTREACH (OUTPATIENT)
Dept: CASE MANAGEMENT | Age: 77
End: 2022-05-02

## 2022-05-02 ENCOUNTER — HOSPITAL ENCOUNTER (OUTPATIENT)
Dept: LAB | Age: 77
Discharge: HOME | End: 2022-05-02
Payer: COMMERCIAL

## 2022-05-02 VITALS
RESPIRATION RATE: 20 BRPM | HEART RATE: 65 BPM | DIASTOLIC BLOOD PRESSURE: 54 MMHG | TEMPERATURE: 97.2 F | SYSTOLIC BLOOD PRESSURE: 122 MMHG | OXYGEN SATURATION: 97 %

## 2022-05-02 LAB
ANION GAP SERPL CALC-SCNC: 5 MMOL/L (ref 3–18)
BASOPHILS # BLD: 0.1 K/UL (ref 0–0.1)
BASOPHILS NFR BLD: 1 % (ref 0–2)
BUN SERPL-MCNC: 14 MG/DL (ref 7–18)
BUN/CREAT SERPL: 11 (ref 12–20)
CALCIUM SERPL-MCNC: 8.5 MG/DL (ref 8.5–10.1)
CHLORIDE SERPL-SCNC: 98 MMOL/L (ref 100–111)
CK SERPL-CCNC: 28 U/L (ref 39–308)
CO2 SERPL-SCNC: 34 MMOL/L (ref 21–32)
CREAT SERPL-MCNC: 1.22 MG/DL (ref 0.6–1.3)
DIFFERENTIAL METHOD BLD: ABNORMAL
EOSINOPHIL # BLD: 1.3 K/UL (ref 0–0.4)
EOSINOPHIL NFR BLD: 13 % (ref 0–5)
ERYTHROCYTE [DISTWIDTH] IN BLOOD BY AUTOMATED COUNT: 14.1 % (ref 11.6–14.5)
GLUCOSE SERPL-MCNC: 108 MG/DL (ref 74–99)
HCT VFR BLD AUTO: 25.6 % (ref 36–48)
HGB BLD-MCNC: 8.2 G/DL (ref 13–16)
IMM GRANULOCYTES # BLD AUTO: 0.1 K/UL (ref 0–0.04)
IMM GRANULOCYTES NFR BLD AUTO: 1 % (ref 0–0.5)
LYMPHOCYTES # BLD: 1 K/UL (ref 0.9–3.6)
LYMPHOCYTES NFR BLD: 10 % (ref 21–52)
MCH RBC QN AUTO: 28.6 PG (ref 24–34)
MCHC RBC AUTO-ENTMCNC: 32 G/DL (ref 31–37)
MCV RBC AUTO: 89.2 FL (ref 78–100)
MONOCYTES # BLD: 0.8 K/UL (ref 0.05–1.2)
MONOCYTES NFR BLD: 8 % (ref 3–10)
NEUTS SEG # BLD: 6.8 K/UL (ref 1.8–8)
NEUTS SEG NFR BLD: 68 % (ref 40–73)
NRBC # BLD: 0 K/UL (ref 0–0.01)
NRBC BLD-RTO: 0 PER 100 WBC
PLATELET # BLD AUTO: 301 K/UL (ref 135–420)
PMV BLD AUTO: 8.8 FL (ref 9.2–11.8)
POTASSIUM SERPL-SCNC: 3.9 MMOL/L (ref 3.5–5.5)
RBC # BLD AUTO: 2.87 M/UL (ref 4.35–5.65)
SODIUM SERPL-SCNC: 137 MMOL/L (ref 136–145)
WBC # BLD AUTO: 10 K/UL (ref 4.6–13.2)

## 2022-05-02 PROCEDURE — 3331090002 HH PPS REVENUE DEBIT

## 2022-05-02 PROCEDURE — 85025 COMPLETE CBC W/AUTO DIFF WBC: CPT

## 2022-05-02 PROCEDURE — 80048 BASIC METABOLIC PNL TOTAL CA: CPT

## 2022-05-02 PROCEDURE — G0299 HHS/HOSPICE OF RN EA 15 MIN: HCPCS

## 2022-05-02 PROCEDURE — 82550 ASSAY OF CK (CPK): CPT

## 2022-05-02 PROCEDURE — 3331090001 HH PPS REVENUE CREDIT

## 2022-05-02 NOTE — PROGRESS NOTES
Care Transitions Follow Up Call    Challenges to be reviewed by the provider   Additional needs identified to be addressed with provider: no  none           Method of communication with provider : none    Care Transition Nurse (CTN) contacted the patient by telephone to follow up after admission on 2022 to 4/15/2022. Verified name and  with patient as identifiers. Addressed changes since last contact: none  Follow up appointment completed? no.   Was follow up appointment scheduled within 7 days of discharge? no.     Advance Care Planning:   Does patient have an Advance Directive:  yes; reviewed and current     CTN reviewed discharge instructions, medical action plan and red flags with patient and discussed any barriers to care and/or understanding of plan of care after discharge. Discussed appropriate site of care based on symptoms and resources available to patient including: PCP, Home Health and [address] [place] [address]. The patient agrees to contact the PCP office for questions related to their healthcare. Patients top risk factors for readmission: medical condition-MRSA bacteremia and support system   Interventions to address risk factors: Scheduled appointment with PCP-[facility] follow up appointment(s):   Future Appointments   Date Time Provider Mike Lamar   2022 To Be Determined Sae Young RN [address]   2022 To Be Determined Sae Young RN 1000 [facility]     Non-[facility] follow up appointment(s): n/a    CTN provided contact information for future needs. Plan for follow-up call in 5-7 days based on severity of symptoms and risk factors. Plan for next call: follow up appointment-encouraged paitent to call and get appt with PCP     Goals Addressed                 This Visit's Progress     Prevent complications post hospitalization. 1. CTN will monitor X 4 weeks    2.  Ensure provider appt is scheduled within 7 days post-discharge 2022 Patient has PCP appt  at 10:30 am    3. Confirm patient attended post-discharge provider apt    4. Complete post-visit call to confirm attendance and update care needs  4/18/2022 Patient is feeling much better this am. He is eating and drinking well. 4/25/2022 Patient is getting stronger each day. He is eating and drinking well. No care needs noted. 5/2/2022 Patient is doing ok. His eating and drinking is declining and he is sleeping a lot. 5. Review/educate common or potential \"red flags\" of condition worsening 4/18/2022 Reviewed signs/symptoms of MRSA bacteremia such as red area, swollen, painful, warm to touch, accompanied by fever to name a few. 6. Evaluate adherence to medications and priority barriers to resolve  4/18/2022 Patient has all meds and is taking as directed. 4/25/2022 Patient has meds and is taking as prescribed. 5/2/2022 Patient has all meds and is taking as he should. 7. Instruct on adherence to medications as ordered and assess for therapeutic response and side-effects  4/18/2022 patient  knows why he is taking meds and knows when to call physician with any issues. 4/25/2022 No concerns about meds. 5/2/2022 No med concerns today. 8. Discuss and evaluate ADL performance. Provide recommendations on energy conservation, particularly related to transition home from an inpatient admission. 4/18/2022 Patient is using walker at present. He does what he can and rests as needed. 4/25/2022 Patient continues to have [facility]. He is using walker for mobility. He rests as needed between activities. 5/2/2022 Patient having [facility]. He is taking one day at a time.

## 2022-05-03 ENCOUNTER — HOME CARE VISIT (OUTPATIENT)
Dept: SCHEDULING | Facility: HOME HEALTH | Age: 77
End: 2022-05-03
Payer: MEDICARE

## 2022-05-03 VITALS
DIASTOLIC BLOOD PRESSURE: 62 MMHG | OXYGEN SATURATION: 97 % | SYSTOLIC BLOOD PRESSURE: 125 MMHG | TEMPERATURE: 97.1 F | HEART RATE: 76 BPM | RESPIRATION RATE: 20 BRPM

## 2022-05-03 LAB
FAX TO INFO,FAXT: NORMAL
FAX TO NUMBER,FAXN: NORMAL

## 2022-05-03 PROCEDURE — 3331090001 HH PPS REVENUE CREDIT

## 2022-05-03 PROCEDURE — 3331090002 HH PPS REVENUE DEBIT

## 2022-05-03 PROCEDURE — G0299 HHS/HOSPICE OF RN EA 15 MIN: HCPCS

## 2022-05-03 NOTE — HOME HEALTH
Skilled reason for visit: Patient was recently hospitalized for Duodenitis, requiring observation by a SN for s/s of decomposition or adverse effects resulting from newly prescribed medications. Skilled observation needed to determine if new medication regimen prescribed requires modifications or other therapeutic interventions considered until pt's clinical condition or treatment has stabilized. Caregiver involvement:  Patient's caregiver is his family. Caregiver assists patient with bathing, dressing, walking, bathroom, meal prep and setup, medication management, grocery shopping, household chores, transportation to MD appointment and home exercise program.  Medications reconciled and all medications are available in the home this visit. The following education was provided regarding medications, medication interactions, and look alike modifications. albuterol (PROVENTIL HFA, VENTOLIN HFA, PROAIR HFA) 90 mcg/actuation inhaler        Contact Agency or MD with questions. Medications are effective at this time. Patient states understanding. Patient education provided this visit:  . X. Meryle Sandman Meryle Sandman Meryle Sandman Disease Management:  COPD, CHF, HTN, PICC line care, IV infusion teaching, lab results teaching, s/s of infection    Patient level of understanding of education provided: Pt verbalized understanding of all education and repeated back teaching   Skilled Care Performed this visit: Disease process teaching, medication teaching, physical assessment and monitoring  Patient response to procedure performed:  Patient verbalized satisfaction with PICC line care and lab draw  Sharps Education Provided: Yes  Goals/teaching progressing. Patient's goal is to be infection free. Progressing toward goals. Patient has remained free from falls, free from infection; no safety concerns at this time and is ambulating independently with walker.   SN to complete education of patient and patient to follow up with any further questions or concerns with Dr   Home exercise program:   Continued need for the following skills: Nursing   Patient and/or caregiver notified and agree to changes in the Plan of Care: No changes  The following discharge planning was discussed with the pt/caregiver:  SN to continue education of patient and discharge patient when teaching and goals are met. Multiple attempts to reach patient's wife to set up SN visit for today. Phone calls, messages left, even texts sent, with no response back. SN did a drive by to see patient today and thankfully the patient was home and I was able to see him.

## 2022-05-03 NOTE — HOME HEALTH
SN assessment, education, PICC line dressing change, PICC blood draw      Caregiver involvement:  ADL's, house keeping, transportation, meal prep. .    Medications reviewed and all medications are available in the home this visit. The following education was provided regarding medications: Meryle Sandman MD notified of any discrepancies/look a-like medications/medication interactions: Norvasc:   Patient should avoid activities requiring coordination until drug effects are realized, as drug may cause dizziness. This drug may cause palpitations, peripheral edema, fatigue, headaches, abdominal pain, nausea, or flushing. Instruct patient to report signs/symptoms of exacerbation of angina or myocardial infarction with initiation and increase in dose. Patients with severe cardiovascular disease have an increased risk. Advise patient there are multiple significant drug-drug interactions for this drug. Consult healthcare professional prior to new drug use (including over-the-counter and herbal drugs). Instruct patient to take a missed dose as soon as possible, but if it has been more than 12 hours since the dose was missed, skip the dose. Medications are effective at this time.       Home health supplies by type and quantity ordered/delivered this visit include: none this visit    Patient education provided this visit: Fall prevention, PICC line care    Sharps education provided: Clinician instructed patient/CG on proper disposal of sharps: Containers should be made of hard plastic, be puncture-resistant and leakproof,   such as a laundry detergent or bleach bottle.  When the container is ¾ full, it should be sealed with tape and labeled   DO NOT RECYCLE prior to discarding in the regular trash.        Patient level of understanding of education provided: Patient states that she understands all education      Skilled Care Performed this visit: SN assessment, education, PICC care, blood draw      Patient response to procedure performed:  Patient responded well to visit without any increase in pain or discomfort        Agency Progress toward goals: progressing    Patient's Progress towards personal goals: progressing    Home exercise program: performs    Continued need for the following skills: Nursing    Plan for next visit: SN assessment, education      Patient and/or caregiver notified and agrees to changes in the Plan of Care YES      The following discharge planning was discussed with the pt/caregiver:  Will d/c to home/self care when goals are met and patient is stable

## 2022-05-04 PROCEDURE — 3331090002 HH PPS REVENUE DEBIT

## 2022-05-04 PROCEDURE — 3331090001 HH PPS REVENUE CREDIT

## 2022-05-04 NOTE — CASE COMMUNICATION
Patient's step-daughter called Intake today, stating the patient had pulled at his PICC line and loosened the line and the dressing. SN went to see the patient and assess the line. The dressing was partially removed (it was just changed yesterday)and he had slightly moved the PICC line, but it still flushes, no drainage noted and IV antibiotics were able to be administered. Patient is constantly scratching and pulling at the line. Teac vlad done again with him on the importance of not touching the line or the dressing and if the line does come loose, the patient will need to go to the ER to have the PICC line reinserted. Case communication with Dr Byron Nielsen.

## 2022-05-04 NOTE — HOME HEALTH
Skilled reason for visit: Patient was recently hospitalized for Duodenitis, requiring observation by a SN for s/s of decomposition or adverse effects resulting from newly prescribed medications. Skilled observation needed to determine if new medication regimen prescribed requires modifications or other therapeutic interventions considered until pt's clinical condition or treatment has stabilized. Caregiver involvement:  Patient's caregiver is his family. Caregiver assists patient with bathing, dressing, walking, bathroom, meal prep and setup, medication management, grocery shopping, household chores, transportation to MD appointment and home exercise program.  Medications reconciled and all medications are available in the home this visit. The following education was provided regarding medications, medication interactions, and look alike modifications. DAPTOMYCIN IV        Contact Agency or MD with questions. Medications are effective at this time. Patient states understanding. Patient education provided this visit:  Zen Greene Disease Management: PICC line safety teaching, s/s of infection  Patient level of understanding of education provided: Pt verbalized understanding of all education and repeated back teaching   Skilled Care Performed this visit: Disease process teaching, medication teaching, physical assessment and monitoring  Patient response to procedure performed:  Pt/cg verbalized satisfaction with PICC line care  Sharps Education Provided: Yes  Goals/teaching progressing. Patient's goal is to have infections healing. Progressing toward goals. Patient has remained free from falls, free from infection; no safety concerns at this time and is ambulating independently with walker.   SN to complete education of patient and patient to follow up with any further questions or concerns with Dr Randalyn Paget exercise program:   Continued need for the following skills: Nursing  Patient and/or caregiver notified and agree to changes in the Plan of Care: No changes    The following discharge planning was discussed with the pt/caregiver:  SN to continue education of patient and discharge patient when teaching and goals are met. Patient's step-daughter called Intake today, stating the patient had pulled at his PICC line and loosened the line and the dressing. SN went to see the patient and assess the line. The dressing was partially removed (it was just changed yesterday)and he had slightly moved the PICC line, but it still flushes, no drainage noted and IV antibiotics were able to be administered. Patient is constantly scratching and pulling at the line. Teaching done again with him on the importance of not touching the line or the dressing and if the line does come loose, the patient will need to go to the ER to have the PICC line reinserted. Case communication with Dr Neville Nino.

## 2022-05-05 PROCEDURE — 3331090002 HH PPS REVENUE DEBIT

## 2022-05-05 PROCEDURE — 3331090001 HH PPS REVENUE CREDIT

## 2022-05-06 PROCEDURE — 3331090002 HH PPS REVENUE DEBIT

## 2022-05-06 PROCEDURE — 3331090001 HH PPS REVENUE CREDIT

## 2022-05-07 PROCEDURE — 3331090001 HH PPS REVENUE CREDIT

## 2022-05-07 PROCEDURE — 3331090002 HH PPS REVENUE DEBIT

## 2022-05-08 PROCEDURE — 3331090002 HH PPS REVENUE DEBIT

## 2022-05-08 PROCEDURE — 3331090001 HH PPS REVENUE CREDIT

## 2022-05-09 ENCOUNTER — HOME CARE VISIT (OUTPATIENT)
Dept: SCHEDULING | Facility: HOME HEALTH | Age: 77
End: 2022-05-09
Payer: MEDICARE

## 2022-05-09 ENCOUNTER — PATIENT OUTREACH (OUTPATIENT)
Dept: CASE MANAGEMENT | Age: 77
End: 2022-05-09

## 2022-05-09 VITALS
TEMPERATURE: 97.3 F | DIASTOLIC BLOOD PRESSURE: 60 MMHG | RESPIRATION RATE: 20 BRPM | HEART RATE: 67 BPM | SYSTOLIC BLOOD PRESSURE: 123 MMHG | OXYGEN SATURATION: 96 %

## 2022-05-09 PROCEDURE — G0299 HHS/HOSPICE OF RN EA 15 MIN: HCPCS

## 2022-05-09 PROCEDURE — 3331090002 HH PPS REVENUE DEBIT

## 2022-05-09 PROCEDURE — 3331090001 HH PPS REVENUE CREDIT

## 2022-05-09 NOTE — PROGRESS NOTES
Care Transitions Follow Up Call    Challenges to be reviewed by the provider   Additional needs identified to be addressed with provider: no  none           Method of communication with provider : none    Care Transition Nurse (CTN) contacted the patient by telephone to follow up after admission on 2022 to 4/15/2022. Verified name and  with patient as identifiers. Addressed changes since last contact: none  Follow up appointment completed? yes. Was follow up appointment scheduled within 7 days of discharge? yes. Advance Care Planning:   Does patient have an Advance Directive:  yes; reviewed and current     CTN reviewed discharge instructions, medical action plan and red flags with patient and discussed any barriers to care and/or understanding of plan of care after discharge. Discussed appropriate site of care based on symptoms and resources available to patient including: PCP, Home Health and [address] [place] [address] Road. The patient agrees to contact the PCP office for questions related to their healthcare. Patients top risk factors for readmission: medical condition-MRSA bacteremia and support system   Interventions to address risk factors: Scheduled appointment with PCP-[facility] follow up appointment(s):   Future Appointments   Date Time Provider Mike Lamar   2022  5:00 PM Madhavi Haji RN 1000 [facility] [address] [facility]   2022 To Be Determined Madhavi Haji RN 1000 [facility]     Non-[facility] follow up appointment(s): n/a    CTN provided contact information for future needs. Plan for follow-up call in 5-7 days based on severity of symptoms and risk factors. Plan for next call: self management-ensure that patient knows who to call if he needs assistance. Goals Addressed                 This Visit's Progress     Prevent complications post hospitalization. 1. CTN will monitor X 4 weeks    2.  Ensure provider appt is scheduled within 7 days post-discharge 2022 Patient has PCP appt 4/20 at 10:30 am    3. Confirm patient attended post-discharge provider apt    4. Complete post-visit call to confirm attendance and update care needs  4/18/2022 Patient is feeling much better this am. He is eating and drinking well. 4/25/2022 Patient is getting stronger each day. He is eating and drinking well. No care needs noted. 5/2/2022 Patient is doing ok. His eating and drinking is declining and he is sleeping a lot. 5/9/2022 Patient continues to get better. He is eating and drinking and sleeping well. 5. Review/educate common or potential \"red flags\" of condition worsening 4/18/2022 Reviewed signs/symptoms of MRSA bacteremia such as red area, swollen, painful, warm to touch, accompanied by fever to name a few. 6. Evaluate adherence to medications and priority barriers to resolve  4/18/2022 Patient has all meds and is taking as directed. 4/25/2022 Patient has meds and is taking as prescribed. 5/2/2022 Patient has all meds and is taking as he should. 5/9/2022 Patient continues to have all meds and take as instructed. 7. Instruct on adherence to medications as ordered and assess for therapeutic response and side-effects  4/18/2022 patient  knows why he is taking meds and knows when to call physician with any issues. 4/25/2022 No concerns about meds. 5/2/2022 No med concerns today. 5/9/2022 No concerns about meds. 8. Discuss and evaluate ADL performance. Provide recommendations on energy conservation, particularly related to transition home from an inpatient admission. 4/18/2022 Patient is using walker at present. He does what he can and rests as needed. 4/25/2022 Patient continues to have [place]. He is using walker for mobility. He rests as needed between activities. 5/2/2022 Patient having [place]. He is taking one day at a time. 5/9/2022 Patient continues to move about and rests as he needs to.

## 2022-05-10 NOTE — HOME HEALTH
Skilled reason for visit: Patient was recently hospitalized for Duodenitis, requiring observation by a SN for s/s of decomposition or adverse effects resulting from newly prescribed medications. Skilled observation needed to determine if new medication regimen prescribed requires modifications or other therapeutic interventions considered until pt's clinical condition or treatment has stabilized. Caregiver involvement: Patient lives with his spouse. Caregiver assists patient with meal prep and setup, medication management, grocery shopping, household chores, transportation to MD appointment and home exercise program.  Medications reconciled and all medications are available in the home this visit. The following education was provided regarding medications, medication interactions, and look alike medications:  Report medication questions or problems to [address] or MD.  Medications are effective currently. Patient states understanding. Patient education provided this visit:  Reconciled all meds with patient on discharge, all questions addressed and answered. Sharps education performed: Yes  Patient level of understanding of education provided: Pt verbalized understanding of all education and repeated back teaching  Skilled Care Performed this visit: Disease process teaching, medication teaching, physical assessment and monitoring  Patient response to procedure performed:  Pt verbalized satisfaction with PICC line being removed, with tip intact  Progress toward goals: Goals/teaching completed. Patient to follow up with any questions or concerns with PCP  Home exercise program:   Continued need for the following skills:  signed   Patient and/or caregiver notified and agrees to changes in the Plan of Care YES - [facility] 5/3/22  The following discharge planning was discussed with the pt/caregiver: SN discharge with teaching and goals not met.   Patient states understanding of patient's meds, precautions and interactions

## 2022-05-13 ENCOUNTER — APPOINTMENT (OUTPATIENT)
Dept: GENERAL RADIOLOGY | Age: 77
End: 2022-05-13
Attending: EMERGENCY MEDICINE
Payer: COMMERCIAL

## 2022-05-13 ENCOUNTER — HOSPITAL ENCOUNTER (EMERGENCY)
Age: 77
Discharge: HOME | End: 2022-05-14
Attending: EMERGENCY MEDICINE
Payer: COMMERCIAL

## 2022-05-13 ENCOUNTER — APPOINTMENT (OUTPATIENT)
Dept: CT IMAGING | Age: 77
End: 2022-05-13
Attending: EMERGENCY MEDICINE
Payer: COMMERCIAL

## 2022-05-13 DIAGNOSIS — J44.1 ACUTE EXACERBATION OF CHRONIC OBSTRUCTIVE PULMONARY DISEASE (COPD) (HCC): Primary | ICD-10-CM

## 2022-05-13 DIAGNOSIS — R06.02 SOB (SHORTNESS OF BREATH): ICD-10-CM

## 2022-05-13 LAB
ALBUMIN SERPL-MCNC: 3.4 G/DL (ref 3.4–5)
ALBUMIN/GLOB SERPL: 1.1 {RATIO} (ref 0.8–1.7)
ALP SERPL-CCNC: 94 U/L (ref 45–117)
ALT SERPL-CCNC: 9 U/L (ref 16–61)
ANION GAP SERPL CALC-SCNC: 5 MMOL/L (ref 3–18)
ARTERIAL PATENCY WRIST A: POSITIVE
AST SERPL-CCNC: 11 U/L (ref 10–38)
ATRIAL RATE: 82 BPM
ATRIAL RATE: 84 BPM
BASE EXCESS BLD CALC-SCNC: 1.9 MMOL/L
BASOPHILS # BLD: 0.1 K/UL (ref 0–0.1)
BASOPHILS NFR BLD: 1 % (ref 0–2)
BDY SITE: ABNORMAL
BILIRUB SERPL-MCNC: 0.3 MG/DL (ref 0.2–1)
BNP SERPL-MCNC: 346 PG/ML (ref 0–1800)
BUN SERPL-MCNC: 20 MG/DL (ref 7–18)
BUN/CREAT SERPL: 15 (ref 12–20)
CALCIUM SERPL-MCNC: 8.6 MG/DL (ref 8.5–10.1)
CALCULATED P AXIS, ECG09: 63 DEGREES
CALCULATED P AXIS, ECG09: 65 DEGREES
CALCULATED R AXIS, ECG10: 25 DEGREES
CALCULATED R AXIS, ECG10: 28 DEGREES
CALCULATED T AXIS, ECG11: 51 DEGREES
CALCULATED T AXIS, ECG11: 58 DEGREES
CHLORIDE SERPL-SCNC: 101 MMOL/L (ref 100–111)
CO2 SERPL-SCNC: 33 MMOL/L (ref 21–32)
COVID-19 RAPID TEST, COVR: NOT DETECTED
CREAT SERPL-MCNC: 1.34 MG/DL (ref 0.6–1.3)
D DIMER PPP FEU-MCNC: 1.07 UG/ML(FEU)
DIAGNOSIS, 93000: NORMAL
DIAGNOSIS, 93000: NORMAL
DIFFERENTIAL METHOD BLD: ABNORMAL
EOSINOPHIL # BLD: 2 K/UL (ref 0–0.4)
EOSINOPHIL NFR BLD: 18 % (ref 0–5)
ERYTHROCYTE [DISTWIDTH] IN BLOOD BY AUTOMATED COUNT: 14.8 % (ref 11.6–14.5)
ETHANOL SERPL-MCNC: <3 MG/DL (ref 0–3)
FLUAV AG NPH QL IA: NEGATIVE
FLUBV AG NOSE QL IA: NEGATIVE
GAS FLOW.O2 O2 DELIVERY SYS: ABNORMAL L/MIN
GLOBULIN SER CALC-MCNC: 3.2 G/DL (ref 2–4)
GLUCOSE SERPL-MCNC: 93 MG/DL (ref 74–99)
HCO3 BLD-SCNC: 26.8 MMOL/L (ref 22–26)
HCT VFR BLD AUTO: 29.6 % (ref 36–48)
HGB BLD-MCNC: 9.5 G/DL (ref 13–16)
IMM GRANULOCYTES # BLD AUTO: 0 K/UL
IMM GRANULOCYTES NFR BLD AUTO: 0 %
LYMPHOCYTES # BLD: 1.5 K/UL (ref 0.9–3.6)
LYMPHOCYTES NFR BLD: 14 % (ref 21–52)
MAGNESIUM SERPL-MCNC: 2 MG/DL (ref 1.6–2.6)
MCH RBC QN AUTO: 27.9 PG (ref 24–34)
MCHC RBC AUTO-ENTMCNC: 32.1 G/DL (ref 31–37)
MCV RBC AUTO: 87.1 FL (ref 78–100)
MONOCYTES # BLD: 1.3 K/UL (ref 0.05–1.2)
MONOCYTES NFR BLD: 12 % (ref 3–10)
NEUTS SEG # BLD: 6 K/UL (ref 1.8–8)
NEUTS SEG NFR BLD: 55 % (ref 40–73)
NRBC # BLD: 0 K/UL (ref 0–0.01)
NRBC BLD-RTO: 0 PER 100 WBC
P-R INTERVAL, ECG05: 164 MS
P-R INTERVAL, ECG05: 174 MS
PCO2 BLD: 42.2 MMHG (ref 35–45)
PH BLD: 7.41 [PH] (ref 7.35–7.45)
PLATELET # BLD AUTO: 315 K/UL (ref 135–420)
PMV BLD AUTO: 8.6 FL (ref 9.2–11.8)
PO2 BLD: 63 MMHG (ref 80–100)
POTASSIUM SERPL-SCNC: 4.2 MMOL/L (ref 3.5–5.5)
PROT SERPL-MCNC: 6.6 G/DL (ref 6.4–8.2)
Q-T INTERVAL, ECG07: 364 MS
Q-T INTERVAL, ECG07: 368 MS
QRS DURATION, ECG06: 84 MS
QRS DURATION, ECG06: 92 MS
QTC CALCULATION (BEZET), ECG08: 429 MS
QTC CALCULATION (BEZET), ECG08: 430 MS
RBC # BLD AUTO: 3.4 M/UL (ref 4.35–5.65)
RBC MORPH BLD: ABNORMAL
SAO2 % BLD: 91.8 % (ref 92–97)
SERVICE CMNT-IMP: ABNORMAL
SODIUM SERPL-SCNC: 139 MMOL/L (ref 136–145)
SOURCE, COVRS: NORMAL
SPECIMEN TYPE: ABNORMAL
TROPONIN-HIGH SENSITIVITY: 5 NG/L (ref 0–78)
TROPONIN-HIGH SENSITIVITY: 6 NG/L (ref 0–78)
VENTRICULAR RATE, ECG03: 82 BPM
VENTRICULAR RATE, ECG03: 84 BPM
WBC # BLD AUTO: 10.9 K/UL (ref 4.6–13.2)

## 2022-05-13 PROCEDURE — 83735 ASSAY OF MAGNESIUM: CPT

## 2022-05-13 PROCEDURE — 80053 COMPREHEN METABOLIC PANEL: CPT

## 2022-05-13 PROCEDURE — 96365 THER/PROPH/DIAG IV INF INIT: CPT

## 2022-05-13 PROCEDURE — 93005 ELECTROCARDIOGRAM TRACING: CPT

## 2022-05-13 PROCEDURE — 74011000250 HC RX REV CODE- 250: Performed by: EMERGENCY MEDICINE

## 2022-05-13 PROCEDURE — 87804 INFLUENZA ASSAY W/OPTIC: CPT

## 2022-05-13 PROCEDURE — 87635 SARS-COV-2 COVID-19 AMP PRB: CPT

## 2022-05-13 PROCEDURE — 96366 THER/PROPH/DIAG IV INF ADDON: CPT

## 2022-05-13 PROCEDURE — 83880 ASSAY OF NATRIURETIC PEPTIDE: CPT

## 2022-05-13 PROCEDURE — 82077 ASSAY SPEC XCP UR&BREATH IA: CPT

## 2022-05-13 PROCEDURE — 99285 EMERGENCY DEPT VISIT HI MDM: CPT

## 2022-05-13 PROCEDURE — 74011000636 HC RX REV CODE- 636: Performed by: EMERGENCY MEDICINE

## 2022-05-13 PROCEDURE — 85379 FIBRIN DEGRADATION QUANT: CPT

## 2022-05-13 PROCEDURE — 71045 X-RAY EXAM CHEST 1 VIEW: CPT

## 2022-05-13 PROCEDURE — 36600 WITHDRAWAL OF ARTERIAL BLOOD: CPT

## 2022-05-13 PROCEDURE — 82803 BLOOD GASES ANY COMBINATION: CPT

## 2022-05-13 PROCEDURE — 96375 TX/PRO/DX INJ NEW DRUG ADDON: CPT

## 2022-05-13 PROCEDURE — 74011250637 HC RX REV CODE- 250/637: Performed by: EMERGENCY MEDICINE

## 2022-05-13 PROCEDURE — 71275 CT ANGIOGRAPHY CHEST: CPT

## 2022-05-13 PROCEDURE — 74011250636 HC RX REV CODE- 250/636: Performed by: EMERGENCY MEDICINE

## 2022-05-13 PROCEDURE — 84484 ASSAY OF TROPONIN QUANT: CPT

## 2022-05-13 PROCEDURE — 85025 COMPLETE CBC W/AUTO DIFF WBC: CPT

## 2022-05-13 RX ORDER — MAGNESIUM SULFATE HEPTAHYDRATE 40 MG/ML
2 INJECTION, SOLUTION INTRAVENOUS ONCE
Status: COMPLETED | OUTPATIENT
Start: 2022-05-13 | End: 2022-05-14

## 2022-05-13 RX ORDER — DOXYCYCLINE 100 MG/1
100 CAPSULE ORAL
Status: COMPLETED | OUTPATIENT
Start: 2022-05-13 | End: 2022-05-14

## 2022-05-13 RX ORDER — ALBUTEROL SULFATE 90 UG/1
2 AEROSOL, METERED RESPIRATORY (INHALATION)
Qty: 1 EACH | Refills: 3 | Status: SHIPPED | OUTPATIENT
Start: 2022-05-13

## 2022-05-13 RX ORDER — OXYMETAZOLINE HCL 0.05 %
2 SPRAY, NON-AEROSOL (ML) NASAL
Status: COMPLETED | OUTPATIENT
Start: 2022-05-13 | End: 2022-05-13

## 2022-05-13 RX ORDER — PREDNISONE 20 MG/1
40 TABLET ORAL
Status: COMPLETED | OUTPATIENT
Start: 2022-05-13 | End: 2022-05-14

## 2022-05-13 RX ORDER — LORAZEPAM 2 MG/ML
1 INJECTION INTRAMUSCULAR
Status: COMPLETED | OUTPATIENT
Start: 2022-05-13 | End: 2022-05-13

## 2022-05-13 RX ORDER — PREDNISONE 20 MG/1
40 TABLET ORAL DAILY
Qty: 14 TABLET | Refills: 0 | Status: SHIPPED | OUTPATIENT
Start: 2022-05-13 | End: 2022-05-20

## 2022-05-13 RX ORDER — DOXYCYCLINE HYCLATE 100 MG
100 TABLET ORAL 2 TIMES DAILY
Qty: 14 TABLET | Refills: 0 | Status: SHIPPED | OUTPATIENT
Start: 2022-05-13 | End: 2022-05-20

## 2022-05-13 RX ORDER — ALBUTEROL SULFATE 2.5 MG/.5ML
5 SOLUTION RESPIRATORY (INHALATION)
Status: COMPLETED | OUTPATIENT
Start: 2022-05-13 | End: 2022-05-13

## 2022-05-13 RX ADMIN — IOPAMIDOL 100 ML: 755 INJECTION, SOLUTION INTRAVENOUS at 20:24

## 2022-05-13 RX ADMIN — ALBUTEROL SULFATE 5 MG: 2.5 SOLUTION RESPIRATORY (INHALATION) at 17:41

## 2022-05-13 RX ADMIN — LORAZEPAM 1 MG: 2 INJECTION INTRAMUSCULAR; INTRAVENOUS at 17:48

## 2022-05-13 RX ADMIN — MAGNESIUM SULFATE HEPTAHYDRATE 2 G: 40 INJECTION, SOLUTION INTRAVENOUS at 17:41

## 2022-05-13 RX ADMIN — OXYMETAZOLINE HCL 2 SPRAY: 0.05 SPRAY NASAL at 17:41

## 2022-05-13 RX ADMIN — METHYLPREDNISOLONE SODIUM SUCCINATE 125 MG: 125 INJECTION, POWDER, FOR SOLUTION INTRAMUSCULAR; INTRAVENOUS at 17:41

## 2022-05-13 NOTE — ED PROVIDER NOTES
EMERGENCY DEPARTMENT HISTORY AND PHYSICAL EXAM    Date: 5/13/2022  Patient Name: Melisa Neri    History of Presenting Illness     Chief Complaint   Patient presents with    Shortness of Breath         History Provided By: Patient and EMS    5:15 PM  Melisa Neri is a 64 y.o. male with PMHX of COPD, atrial fibrillation on Eliquis, CHF who presents to the emergency department C/O shortness of breath. Per patient the shortness of breath started a few days ago but became more severe today. Patient is on 2 L/min nasal cannula at home. He reports he took a nebulizer treatment without relief so called 911. EMS states they gave him another DuoNeb which resolved the wheezing he had on their arrival.  Patient denies any fever, chest pain, vomiting, sick contacts, recent travel. He does note some cough. No other relieving or exacerbating factors identified. PCP: Lina Burk MD    Current Outpatient Medications   Medication Sig Dispense Refill    albuterol (PROVENTIL HFA, VENTOLIN HFA, PROAIR HFA) 90 mcg/actuation inhaler Take 2 Puffs by inhalation every four (4) hours as needed for Wheezing or Shortness of Breath. 1 Each 3    predniSONE (DELTASONE) 20 mg tablet Take 2 Tablets by mouth daily for 7 days. With Breakfast 14 Tablet 0    doxycycline (VIBRA-TABS) 100 mg tablet Take 1 Tablet by mouth two (2) times a day for 7 days. 14 Tablet 0    DAPTOMYCIN  mg by IntraVENous route daily. in 25mL NS      sodium chloride (Normal Saline Flush) 5-10 mL by IntraVENous route daily. flush IV catheter with 10ml before and after infusing each dose of medication as directed.  heparin sodium,porcine (HEPARIN LOCK FLUSH IV) 3 mL by IntraVENous route daily. flush IV catheter with 3ml of heparin 10units/mL after the last dose of 0.9% sodium chloride flush, after each dose of medication or as directed. flush IV catheter every day if not in use.       ferrous sulfate 325 mg (65 mg iron) tablet Take 1 Tablet by mouth two (2) times daily (with meals). 60 Tablet 3    apixaban (ELIQUIS) 5 mg tablet Take 1 Tablet by mouth two (2) times a day. 60 Tablet 2    tamsulosin (FLOMAX) 0.4 mg capsule Take 1 Capsule by mouth every evening. 30 Capsule 3    furosemide (Lasix) 20 mg tablet Take 1 Tablet by mouth daily. 30 Tablet 3    amLODIPine (NORVASC) 10 mg tablet Take 1 Tablet by mouth daily. 30 Tablet 3    albuterol-ipratropium (DUO-NEB) 2.5 mg-0.5 mg/3 ml nebu 3 mL by Nebulization route every four (4) hours as needed for Wheezing. 30 Nebule 3    pantoprazole (PROTONIX) 40 mg tablet Take 1 Tablet by mouth Before breakfast and dinner. 60 Tablet 0    OXYGEN-AIR DELIVERY SYSTEMS 2 L/min by Nasal route daily as needed for PRN Reason (Other) (SOB/wheezing).  dextran 70/hypromellose (ARTIFICIAL TEARS, PF, OP) Administer 2 Drops to both eyes daily as needed for Other (dry eyes).          Past History       Past Medical History:  Past Medical History:   Diagnosis Date    BMI 30.0-30.9,adult 4/2/2022    Brain aneurysm     Brain aneurysm     Cancer [facility])     prostate    CHF (congestive heart failure) (HCC)     CKD (chronic kidney disease)     Closed nondisplaced supracondylar fracture of lower end of left femur without intracondylar extension with delayed healing     COPD (chronic obstructive pulmonary disease) ([address]     Debility     History of home oxygen therapy     [place] (hard of hearing)     Hypertension     Nocturia     On home oxygen therapy     PAF (paroxysmal atrial fibrillation) (HCC)     Pneumonia     Prostate CA ([address]     Prostate neoplasm     Radiation effect        Past Surgical History:  Past Surgical History:   Procedure Laterality Date    HX HERNIA REPAIR      HX HIP ARTHROSCOPY  04/09/2021       Family History:  Family History   Problem Relation Age of Onset    Heart Disease Mother        Social History:  Social History     Tobacco Use    Smoking status: Former Smoker     Types: Cigarettes    Smokeless tobacco: Never Used   Substance Use Topics    Alcohol use: No    Drug use: Never       Allergies: Allergies   Allergen Reactions    Aspirin Other (comments)     \"messes my stomach up\"    Bactrim [Sulfamethoxazole-Trimethoprim] Unknown (comments)    Nsaids (Non-Steroidal Anti-Inflammatory Drug) Unknown (comments)         Review of Systems   Review of Systems   Constitutional: Negative for fever. Respiratory: Positive for cough, shortness of breath and wheezing. Cardiovascular: Negative for chest pain. Gastrointestinal: Negative for abdominal pain. All other systems reviewed and are negative.          Physical Exam     Vitals:    05/13/22 2217 05/13/22 2217 05/13/22 2313 05/14/22 0005   BP:  (!) 130/56  (!) 156/78   Pulse: 71   82   Resp: 25   18   Temp:   98.6 °F (37 °C) 98.8 °F (37.1 °C)   SpO2: (!) 82%  97% 100%     Physical Exam    Nursing notes and vital signs reviewed    Constitutional: Non toxic appearing, moderate distress  Head: Normocephalic, Atraumatic  Eyes: EOMI  Neck: Supple  Cardiovascular: Regular rate and rhythm, no murmurs, rubs, or gallops  Chest: Normal work of breathing and chest excursion bilaterally  Lungs: Clear to ausculation bilaterally  Abdomen: Soft, non tender, non distended  Back: No evidence of trauma or deformity  Extremities: No evidence of trauma or deformity, no LE edema  Skin: Warm and dry, normal cap refill  Neuro: Alert and appropriate  Psychiatric: Normal mood and affect      Diagnostic Study Results     Labs -     Recent Results (from the past 12 hour(s))   EKG, 12 LEAD, INITIAL    Collection Time: 05/13/22  5:18 PM   Result Value Ref Range    Ventricular Rate 84 BPM    Atrial Rate 84 BPM    P-R Interval 164 ms    QRS Duration 84 ms    Q-T Interval 364 ms    QTC Calculation (Bezet) 430 ms    Calculated P Axis 65 degrees    Calculated R Axis 25 degrees    Calculated T Axis 58 degrees    Diagnosis       Normal sinus rhythm  Possible Anterior infarct , age undetermined  Abnormal ECG  When compared with ECG of 20-APR-2022 16:35,  Sinus rhythm has replaced Atrial fibrillation     CBC WITH AUTOMATED DIFF    Collection Time: 05/13/22  5:30 PM   Result Value Ref Range    WBC 10.9 4.6 - 13.2 K/uL    RBC 3.40 (L) 4.35 - 5.65 M/uL    HGB 9.5 (L) 13.0 - 16.0 g/dL    HCT 29.6 (L) 36.0 - 48.0 %    MCV 87.1 78.0 - 100.0 FL    MCH 27.9 24.0 - 34.0 PG    MCHC 32.1 31.0 - 37.0 g/dL    RDW 14.8 (H) 11.6 - 14.5 %    PLATELET 077 871 - 919 K/uL    MPV 8.6 (L) 9.2 - 11.8 FL    NRBC 0.0 0  WBC    ABSOLUTE NRBC 0.00 0.00 - 0.01 K/uL    NEUTROPHILS 55 40 - 73 %    LYMPHOCYTES 14 (L) 21 - 52 %    MONOCYTES 12 (H) 3 - 10 %    EOSINOPHILS 18 (H) 0 - 5 %    BASOPHILS 1 0 - 2 %    IMMATURE GRANULOCYTES 0 %    ABS. NEUTROPHILS 6.0 1.8 - 8.0 K/UL    ABS. LYMPHOCYTES 1.5 0.9 - 3.6 K/UL    ABS. MONOCYTES 1.3 (H) 0.05 - 1.2 K/UL    ABS. EOSINOPHILS 2.0 (H) 0.0 - 0.4 K/UL    ABS. BASOPHILS 0.1 0.0 - 0.1 K/UL    ABS. IMM. GRANS. 0.0 K/UL    DF MANUAL      RBC COMMENTS NORMOCYTIC, NORMOCHROMIC     TROPONIN-HIGH SENSITIVITY    Collection Time: 05/13/22  5:30 PM   Result Value Ref Range    Troponin-High Sensitivity 6 0 - 78 ng/L   D DIMER    Collection Time: 05/13/22  5:30 PM   Result Value Ref Range    D DIMER 1.07 (H) <0.46 ug/ml(FEU)   METABOLIC PANEL, COMPREHENSIVE    Collection Time: 05/13/22  5:30 PM   Result Value Ref Range    Sodium 139 136 - 145 mmol/L    Potassium 4.2 3.5 - 5.5 mmol/L    Chloride 101 100 - 111 mmol/L    CO2 33 (H) 21 - 32 mmol/L    Anion gap 5 3.0 - 18 mmol/L    Glucose 93 74 - 99 mg/dL    BUN 20 (H) 7.0 - 18 MG/DL    Creatinine 1.34 (H) 0.6 - 1.3 MG/DL    BUN/Creatinine ratio 15 12 - 20      GFR est AA >60 >60 ml/min/1.73m2    GFR est non-AA 52 (L) >60 ml/min/1.73m2    Calcium 8.6 8.5 - 10.1 MG/DL    Bilirubin, total 0.3 0.2 - 1.0 MG/DL    ALT (SGPT) 9 (L) 16 - 61 U/L    AST (SGOT) 11 10 - 38 U/L    Alk.  phosphatase 94 45 - 117 U/L    Protein, total 6.6 6.4 - 8.2 g/dL Albumin 3.4 3.4 - 5.0 g/dL    Globulin 3.2 2.0 - 4.0 g/dL    A-G Ratio 1.1 0.8 - 1.7     COVID-19 RAPID TEST    Collection Time: 05/13/22  5:30 PM   Result Value Ref Range    Specimen source Nasopharyngeal      COVID-19 rapid test Not detected NOTD     INFLUENZA A & B AG (RAPID TEST)    Collection Time: 05/13/22  5:30 PM   Result Value Ref Range    Influenza A Antigen Negative NEG      Influenza B Antigen Negative NEG     NT-PRO BNP    Collection Time: 05/13/22  5:30 PM   Result Value Ref Range    NT pro- 0 - 1,800 PG/ML   MAGNESIUM    Collection Time: 05/13/22  5:30 PM   Result Value Ref Range    Magnesium 2.0 1.6 - 2.6 mg/dL   ETHYL ALCOHOL    Collection Time: 05/13/22  5:30 PM   Result Value Ref Range    ALCOHOL(ETHYL),SERUM <3 0 - 3 MG/DL   BLOOD GAS, ARTERIAL POC    Collection Time: 05/13/22  6:28 PM   Result Value Ref Range    Device: ROOM AIR      pH (POC) 7.41 7.35 - 7.45      pCO2 (POC) 42.2 35.0 - 45.0 MMHG    pO2 (POC) 63 (L) 80 - 100 MMHG    HCO3 (POC) 26.8 (H) 22 - 26 MMOL/L    sO2 (POC) 91.8 (L) 92 - 97 %    Base excess (POC) 1.9 mmol/L    Allens test (POC) Positive      Site RIGHT RADIAL      Specimen type (POC) ARTERIAL      Performed by Jane Lugo          Radiologic Studies -   CTA CHEST W OR W WO CONT   Final Result      No evidence of a pulmonary embolism or aortic dissection. Scarring and/or atelectasis at the right lung base. Stable superior endplate fracture of T1 without retropulsion or listhesis. XR CHEST PORT   Final Result   Moderate to severe emphysema with stable interstitial infiltrate at   the right lung base.         CT Results  (Last 48 hours)    None        CXR Results  (Last 48 hours)    None          Medications given in the ED-  Medications   methylPREDNISolone (PF) (Solu-MEDROL) injection 125 mg (125 mg IntraVENous Given 5/13/22 0176)   magnesium sulfate 2 g/50 ml IVPB (premix or compounded) (0 g IntraVENous IV Completed 5/14/22 0035)   albuterol CONCENTRATE 2.5mg/0.5 mL neb soln (5 mg Nebulization Given 5/13/22 1741)   oxymetazoline (AFRIN) 0.05 % nasal spray 2 Spray (2 Sprays Both Nostrils Given 5/13/22 1741)   LORazepam (ATIVAN) injection 1 mg (1 mg IntraVENous Given 5/13/22 1748)   iopamidoL (ISOVUE-370) 76 % injection 100 mL (100 mL IntraVENous Given 5/13/22 2024)   predniSONE (DELTASONE) tablet 40 mg (40 mg Oral Given 5/14/22 0004)   doxycycline (MONODOX) capsule 100 mg (100 mg Oral Given 5/14/22 0004)         Medical Decision Making   I am the first provider for this patient. I reviewed the vital signs, available nursing notes, past medical history, past surgical history, family history and social history. Vital Signs-Reviewed the patient's vital signs. Pulse Oximetry Analysis - 100% on room air, not hypoxic     Cardiac Monitor:  Rate: 88 bpm  Rhythm: Normal sinus    EKG interpretation: (Preliminary)  EKG read by Dr. Kenisha Shen at 5:13 PM  Normal sinus rhythm at a rate of 84 bpm, ND interval 164 ms, QRS duration 84 ms, similar compared to prior from April 2022    Records Reviewed: Nursing Notes, Old Medical Records and Previous electrocardiograms    Provider Notes (Medical Decision Making): Jacque Colon is a 64 y.o. male ending with shortness of breath and congestion. Patient improved with nebulizers in route with EMS and was continued to be treated for suspected COPD exacerbation here in the ED with nebs, IV mag, IV Solu-Medrol. Wells low risk, PERC positive, D-dimer elevated. CTA has been ordered and pending. White count normal initial high-sensitivity troponin and proBNP negative. If patient continues to improve and CTA is without PE or acute process anticipate likely discharge with continued COPD exacerbation management at home. Procedures:  Procedures    ED Course:   5:28 PM  Patient now complaining that he cannot breathe. Patient still is moving air well bilaterally with scattered expiratory wheezes.   Seems primarily related to nasal congestion and patient keeps blowing his nose. Will order Afrin as well as additional COPD management and continue to monitor closely. 8:30 PM  Progress note  I assumed care of patient after shift change. He has not required any further additional treatment. He has had multiple breathing treatments. Examination of his ear and his nose reveals no evidence of bleeding. 11:14 PM  Progress note  Patient states he is ready to be discharged. He has received multiple breathing treatments and steroids for his COPD exacerbation. He has no evidence of active pneumonia and cardiac work-up including serial troponins BNP all coming back unremarkable. He can be released with breathing treatment steroids and antibiotics although he has no active evidence of pneumonia. Diagnosis and Disposition     Critical Care: 38 minutes  CRITICAL CARE NOTE:  11:41 PM  I have spent 48 minutes of critical care time involved in lab review, consultations with specialist, family decision-making, and documentation. During this entire length of time I was immediately available to the patient. Critical Care: The reason for providing this level of medical care for this critically ill patient was due a critical illness that impaired one or more vital organ systems such that there was a high probability of imminent or life threatening deterioration in the patients condition. This care involved high complexity decision making to assess, manipulate, and support vital system functions, to treat this degreee vital organ system failure and to prevent further life threatening deterioration of the patients condition. DISCHARGE NOTE:  11:28 PM  Pat[place]'s  results have been reviewed with him. He has been counseled regarding his diagnosis, treatment, and plan.   He verbally conveys understanding and agreement of the signs, symptoms, diagnosis, treatment and prognosis and additionally agrees to follow up as discussed. He also agrees with the care-plan and conveys that all of his questions have been answered. I have also provided discharge instructions for him that include: educational information regarding their diagnosis and treatment, and list of reasons why they would want to return to the ED prior to their follow-up appointment, should his condition change. He has been provided with education for proper emergency department utilization. CLINICAL IMPRESSION:    1. Acute exacerbation of chronic obstructive pulmonary disease (COPD) ([address]    2. SOB (shortness of breath)        PLAN:  1. D/C Home  2. Discharge Medication List as of 5/14/2022 12:00 AM      START taking these medications    Details   predniSONE (DELTASONE) 20 mg tablet Take 2 Tablets by mouth daily for 7 days. With Breakfast, Normal, Disp-14 Tablet, R-0      doxycycline (VIBRA-TABS) 100 mg tablet Take 1 Tablet by mouth two (2) times a day for 7 days. , Normal, Disp-14 Tablet, R-0         CONTINUE these medications which have CHANGED    Details   albuterol (PROVENTIL HFA, VENTOLIN HFA, PROAIR HFA) 90 mcg/actuation inhaler Take 2 Puffs by inhalation every four (4) hours as needed for Wheezing or Shortness of Breath., Normal, Disp-1 Each, R-3         CONTINUE these medications which have NOT CHANGED    Details   DAPTOMYCIN  mg by IntraVENous route daily. in 25mL NS, Historical Med      sodium chloride (Normal Saline Flush) 5-10 mL by IntraVENous route daily. flush IV catheter with 10ml before and after infusing each dose of medication as directed., Historical Med      heparin sodium,porcine (HEPARIN LOCK FLUSH IV) 3 mL by IntraVENous route daily. flush IV catheter with 3ml of heparin 10units/mL after the last dose of 0.9% sodium chloride flush, after each dose of medication or as directed.  flush IV catheter every day if not in use., Historical Med      ferrous sulfate 325 mg (65 mg iron) tablet Take 1 Tablet by mouth two (2) times daily (with meals). , Normal, Disp-60 Tablet, R-3      apixaban (ELIQUIS) 5 mg tablet Take 1 Tablet by mouth two (2) times a day., Normal, Disp-60 Tablet, R-2      arformoteroL (BROVANA) 15 mcg/2 mL nebu neb solution 2 mL by Nebulization route two (2) times a day for 30 days. , Normal, Disp-120 mL, R-3      budesonide (PULMICORT) 0.5 mg/2 mL nbsp 2 mL by Nebulization route two (2) times a day for 30 days. , Normal, Disp-60 Each, R-3      tamsulosin (FLOMAX) 0.4 mg capsule Take 1 Capsule by mouth every evening., Normal, Disp-30 Capsule, R-3      furosemide (Lasix) 20 mg tablet Take 1 Tablet by mouth daily. , Normal, Disp-30 Tablet, R-3      amLODIPine (NORVASC) 10 mg tablet Take 1 Tablet by mouth daily. , Normal, Disp-30 Tablet, R-3      albuterol-ipratropium (DUO-NEB) 2.5 mg-0.5 mg/3 ml nebu 3 mL by Nebulization route every four (4) hours as needed for Wheezing., Normal, Disp-30 Nebule, R-3      pantoprazole (PROTONIX) 40 mg tablet Take 1 Tablet by mouth Before breakfast and dinner., Normal, Disp-60 Tablet, R-0      OXYGEN-AIR DELIVERY SYSTEMS 2 L/min by Nasal route daily as needed for PRN Reason (Other) (SOB/wheezing). , Historical Med      dextran 70/hypromellose (ARTIFICIAL TEARS, PF, OP) Administer 2 Drops to both eyes daily as needed for Other (dry eyes). , Historical Med           3. Follow-up Information    None       _______________________________      Please note that this dictation was completed with [employer], the [employer] voice recognition software. Quite often unanticipated grammatical, syntax, homophones, and other interpretive errors are inadvertently transcribed by the computer software. Please disregard these errors. Please excuse any errors that have escaped final proofreading.

## 2022-05-13 NOTE — ED TRIAGE NOTES
Pt arrives to ed per ems reporting sob that began today and did not get any better with his albuterol treatment. Pt does have copd and afib.

## 2022-05-14 VITALS
OXYGEN SATURATION: 100 % | RESPIRATION RATE: 18 BRPM | HEART RATE: 82 BPM | TEMPERATURE: 98.8 F | SYSTOLIC BLOOD PRESSURE: 156 MMHG | DIASTOLIC BLOOD PRESSURE: 78 MMHG

## 2022-05-14 PROCEDURE — 74011250637 HC RX REV CODE- 250/637: Performed by: EMERGENCY MEDICINE

## 2022-05-14 PROCEDURE — A9270 NON-COVERED ITEM OR SERVICE: HCPCS | Performed by: EMERGENCY MEDICINE

## 2022-05-14 PROCEDURE — 74011636637 HC RX REV CODE- 636/637: Performed by: EMERGENCY MEDICINE

## 2022-05-14 RX ADMIN — PREDNISONE 40 MG: 20 TABLET ORAL at 00:04

## 2022-05-14 RX ADMIN — DOXYCYCLINE 100 MG: 100 CAPSULE ORAL at 00:04

## 2022-05-18 ENCOUNTER — PATIENT OUTREACH (OUTPATIENT)
Dept: CASE MANAGEMENT | Age: 77
End: 2022-05-18

## 2022-05-18 NOTE — PROGRESS NOTES
Patient has graduated from the Transitions of Care Coordination  program on 5/18/2022. Patient's symptoms are stable at this time. Patient/family has the ability to self-manage. Care management goals have been completed at this time. No further care transitions nurse follow up scheduled. Goals Addressed                 This Visit's Progress     COMPLETED: Prevent complications post hospitalization. 1. CTN will monitor X 4 weeks    2. Ensure provider appt is scheduled within 7 days post-discharge 4/18/2022 Patient has PCP appt 4/20 at 10:30 am    3. Confirm patient attended post-discharge provider apt    4. Complete post-visit call to confirm attendance and update care needs  4/18/2022 Patient is feeling much better this am. He is eating and drinking well. 4/25/2022 Patient is getting stronger each day. He is eating and drinking well. No care needs noted. 5/2/2022 Patient is doing ok. His eating and drinking is declining and he is sleeping a lot. 5/9/2022 Patient continues to get better. He is eating and drinking and sleeping well. 5/18/2022 Patient continues to do well. Had some SOB 5/13/2022. Doing better. No care needs noted. 5. Review/educate common or potential \"red flags\" of condition worsening 4/18/2022 Reviewed signs/symptoms of MRSA bacteremia such as red area, swollen, painful, warm to touch, accompanied by fever to name a few. 6. Evaluate adherence to medications and priority barriers to resolve  4/18/2022 Patient has all meds and is taking as directed. 4/25/2022 Patient has meds and is taking as prescribed. 5/2/2022 Patient has all meds and is taking as he should. 5/9/2022 Patient continues to have all meds and take as instructed. 5/18/2022 Patient continues to have all meds and is taking per physician orders.       7. Instruct on adherence to medications as ordered and assess for therapeutic response and side-effects  4/18/2022 patient  knows why he is taking meds and knows when to call physician with any issues. 4/25/2022 No concerns about meds. 5/2/2022 No med concerns today. 5/9/2022 No concerns about meds. 5/18/2022 Patient has no medication concerns. He takes without fail. 8. Discuss and evaluate ADL performance. Provide recommendations on energy conservation, particularly related to transition home from an inpatient admission. 4/18/2022 Patient is using walker at present. He does what he can and rests as needed. 4/25/2022 Patient continues to have [facility]. He is using walker for mobility. He rests as needed between activities. 5/2/2022 Patient having [facility]. He is taking one day at a time. 5/9/2022 Patient continues to move about and rests as he needs to. 5/18/2022 patient has been d/c from [facility]. He is doing well. He moves about as needed and rests between activities. Patient has care transitions nurse contact information for any further questions, concerns, or needs. Patient's upcoming visits:  No future appointments.

## 2022-05-23 ENCOUNTER — HOSPITAL ENCOUNTER (EMERGENCY)
Age: 77
Discharge: HOME | End: 2022-05-23
Attending: EMERGENCY MEDICINE
Payer: COMMERCIAL

## 2022-05-23 ENCOUNTER — APPOINTMENT (OUTPATIENT)
Dept: GENERAL RADIOLOGY | Age: 77
End: 2022-05-23
Attending: EMERGENCY MEDICINE
Payer: COMMERCIAL

## 2022-05-23 VITALS
DIASTOLIC BLOOD PRESSURE: 56 MMHG | TEMPERATURE: 98 F | RESPIRATION RATE: 20 BRPM | BODY MASS INDEX: 25.72 KG/M2 | SYSTOLIC BLOOD PRESSURE: 110 MMHG | WEIGHT: 174.16 LBS | OXYGEN SATURATION: 100 % | HEART RATE: 102 BPM

## 2022-05-23 DIAGNOSIS — J44.1 ACUTE EXACERBATION OF CHRONIC OBSTRUCTIVE PULMONARY DISEASE (COPD) (HCC): ICD-10-CM

## 2022-05-23 DIAGNOSIS — R06.02 SOB (SHORTNESS OF BREATH): Primary | ICD-10-CM

## 2022-05-23 LAB
ALBUMIN SERPL-MCNC: 3.1 G/DL (ref 3.4–5)
ALBUMIN/GLOB SERPL: 0.9 {RATIO} (ref 0.8–1.7)
ALP SERPL-CCNC: 80 U/L (ref 45–117)
ALT SERPL-CCNC: 16 U/L (ref 16–61)
ANION GAP SERPL CALC-SCNC: 9 MMOL/L (ref 3–18)
AST SERPL-CCNC: 10 U/L (ref 10–38)
BASOPHILS # BLD: 0 K/UL (ref 0–0.1)
BASOPHILS NFR BLD: 0 % (ref 0–2)
BILIRUB SERPL-MCNC: 0.3 MG/DL (ref 0.2–1)
BUN SERPL-MCNC: 34 MG/DL (ref 7–18)
BUN/CREAT SERPL: 21 (ref 12–20)
CALCIUM SERPL-MCNC: 8 MG/DL (ref 8.5–10.1)
CHLORIDE SERPL-SCNC: 106 MMOL/L (ref 100–111)
CO2 SERPL-SCNC: 25 MMOL/L (ref 21–32)
CREAT SERPL-MCNC: 1.61 MG/DL (ref 0.6–1.3)
DIFFERENTIAL METHOD BLD: ABNORMAL
EOSINOPHIL # BLD: 0 K/UL (ref 0–0.4)
EOSINOPHIL NFR BLD: 0 % (ref 0–5)
ERYTHROCYTE [DISTWIDTH] IN BLOOD BY AUTOMATED COUNT: 14.6 % (ref 11.6–14.5)
GLOBULIN SER CALC-MCNC: 3.4 G/DL (ref 2–4)
GLUCOSE SERPL-MCNC: 127 MG/DL (ref 74–99)
HCT VFR BLD AUTO: 29.1 % (ref 36–48)
HGB BLD-MCNC: 9.1 G/DL (ref 13–16)
IMM GRANULOCYTES # BLD AUTO: 0.2 K/UL (ref 0–0.04)
IMM GRANULOCYTES NFR BLD AUTO: 2 % (ref 0–0.5)
LYMPHOCYTES # BLD: 0.2 K/UL (ref 0.9–3.6)
LYMPHOCYTES NFR BLD: 2 % (ref 21–52)
MCH RBC QN AUTO: 28.3 PG (ref 24–34)
MCHC RBC AUTO-ENTMCNC: 31.3 G/DL (ref 31–37)
MCV RBC AUTO: 90.7 FL (ref 78–100)
MONOCYTES # BLD: 0.1 K/UL (ref 0.05–1.2)
MONOCYTES NFR BLD: 1 % (ref 3–10)
NEUTS SEG # BLD: 11.8 K/UL (ref 1.8–8)
NEUTS SEG NFR BLD: 96 % (ref 40–73)
NRBC # BLD: 0 K/UL (ref 0–0.01)
NRBC BLD-RTO: 0 PER 100 WBC
PLATELET # BLD AUTO: 343 K/UL (ref 135–420)
PMV BLD AUTO: 8.5 FL (ref 9.2–11.8)
POTASSIUM SERPL-SCNC: 4 MMOL/L (ref 3.5–5.5)
PROT SERPL-MCNC: 6.5 G/DL (ref 6.4–8.2)
RBC # BLD AUTO: 3.21 M/UL (ref 4.35–5.65)
SODIUM SERPL-SCNC: 140 MMOL/L (ref 136–145)
TROPONIN-HIGH SENSITIVITY: 5 NG/L (ref 0–78)
TROPONIN-HIGH SENSITIVITY: 6 NG/L (ref 0–78)
WBC # BLD AUTO: 12.3 K/UL (ref 4.6–13.2)

## 2022-05-23 PROCEDURE — 85025 COMPLETE CBC W/AUTO DIFF WBC: CPT

## 2022-05-23 PROCEDURE — 99285 EMERGENCY DEPT VISIT HI MDM: CPT

## 2022-05-23 PROCEDURE — 74011000250 HC RX REV CODE- 250: Performed by: EMERGENCY MEDICINE

## 2022-05-23 PROCEDURE — 96361 HYDRATE IV INFUSION ADD-ON: CPT

## 2022-05-23 PROCEDURE — 71045 X-RAY EXAM CHEST 1 VIEW: CPT

## 2022-05-23 PROCEDURE — 96374 THER/PROPH/DIAG INJ IV PUSH: CPT

## 2022-05-23 PROCEDURE — 93005 ELECTROCARDIOGRAM TRACING: CPT

## 2022-05-23 PROCEDURE — 84484 ASSAY OF TROPONIN QUANT: CPT

## 2022-05-23 PROCEDURE — 74011250636 HC RX REV CODE- 250/636: Performed by: EMERGENCY MEDICINE

## 2022-05-23 PROCEDURE — 94762 N-INVAS EAR/PLS OXIMTRY CONT: CPT

## 2022-05-23 PROCEDURE — 94640 AIRWAY INHALATION TREATMENT: CPT

## 2022-05-23 PROCEDURE — 80053 COMPREHEN METABOLIC PANEL: CPT

## 2022-05-23 RX ORDER — DOXYCYCLINE HYCLATE 100 MG
100 TABLET ORAL 2 TIMES DAILY
Qty: 14 TABLET | Refills: 0 | Status: SHIPPED | OUTPATIENT
Start: 2022-05-23 | End: 2022-05-30

## 2022-05-23 RX ORDER — PREDNISONE 10 MG/1
TABLET ORAL
Qty: 1 DOSE PACK | Refills: 0 | OUTPATIENT
Start: 2022-05-23 | End: 2022-08-21

## 2022-05-23 RX ORDER — IPRATROPIUM BROMIDE AND ALBUTEROL SULFATE 2.5; .5 MG/3ML; MG/3ML
3 SOLUTION RESPIRATORY (INHALATION)
Status: COMPLETED | OUTPATIENT
Start: 2022-05-23 | End: 2022-05-23

## 2022-05-23 RX ADMIN — SODIUM CHLORIDE 1000 ML: 900 INJECTION, SOLUTION INTRAVENOUS at 18:44

## 2022-05-23 RX ADMIN — IPRATROPIUM BROMIDE AND ALBUTEROL SULFATE 3 ML: .5; 3 SOLUTION RESPIRATORY (INHALATION) at 18:02

## 2022-05-23 RX ADMIN — METHYLPREDNISOLONE SODIUM SUCCINATE 125 MG: 125 INJECTION, POWDER, FOR SOLUTION INTRAMUSCULAR; INTRAVENOUS at 18:02

## 2022-05-23 NOTE — ED TRIAGE NOTES
Patient reports that he was having shortness of breath. States he wears home oxygen. Ems arrived sats were in low 90s and has doing home nebulizer.  Ems placed him on oxygen and they him aa nebs and sats came up high 90's

## 2022-05-23 NOTE — ED PROVIDER NOTES
EMERGENCY DEPARTMENT HISTORY AND PHYSICAL EXAM    Date: 5/23/2022  Patient Name: Carson Iyer    History of Presenting Illness     Chief Complaint   Patient presents with    Shortness of Breath         History Provided By: Patient and EMS    5:50 PM  Carson Iyer is a 64 y.o. male with PMHX of COPD on 2 L nasal cannula, hypertension at home who presents to the emergency department C/O of breath and wheezing. Patient reports he started having trouble breathing today so he called 911. He reports it feels similar to prior asthma exacerbations. EMS reports that when they arrived he was giving himself a nebulizer treatment. They gave another nebulizer treatment in route. Patient reports that now he is feeling much better. Denies any fever, chest pain, vomiting, bowel or urinary complaints. No other relieving or exacerbating factors identified. PCP: Torrie Ramon MD    Current Outpatient Medications   Medication Sig Dispense Refill    doxycycline (VIBRA-TABS) 100 mg tablet Take 1 Tablet by mouth two (2) times a day for 7 days. 14 Tablet 0    predniSONE (STERAPRED DS) 10 mg dose pack 6 day dose pack per package instructions 1 Dose Pack 0    albuterol (PROVENTIL HFA, VENTOLIN HFA, PROAIR HFA) 90 mcg/actuation inhaler Take 2 Puffs by inhalation every four (4) hours as needed for Wheezing or Shortness of Breath. 1 Each 3    DAPTOMYCIN  mg by IntraVENous route daily. in 25mL NS      sodium chloride (Normal Saline Flush) 5-10 mL by IntraVENous route daily. flush IV catheter with 10ml before and after infusing each dose of medication as directed.  heparin sodium,porcine (HEPARIN LOCK FLUSH IV) 3 mL by IntraVENous route daily. flush IV catheter with 3ml of heparin 10units/mL after the last dose of 0.9% sodium chloride flush, after each dose of medication or as directed. flush IV catheter every day if not in use.       ferrous sulfate 325 mg (65 mg iron) tablet Take 1 Tablet by mouth two (2) times daily (with meals). 60 Tablet 3    apixaban (ELIQUIS) 5 mg tablet Take 1 Tablet by mouth two (2) times a day. 60 Tablet 2    tamsulosin (FLOMAX) 0.4 mg capsule Take 1 Capsule by mouth every evening. 30 Capsule 3    furosemide (Lasix) 20 mg tablet Take 1 Tablet by mouth daily. 30 Tablet 3    amLODIPine (NORVASC) 10 mg tablet Take 1 Tablet by mouth daily. 30 Tablet 3    albuterol-ipratropium (DUO-NEB) 2.5 mg-0.5 mg/3 ml nebu 3 mL by Nebulization route every four (4) hours as needed for Wheezing. 30 Nebule 3    pantoprazole (PROTONIX) 40 mg tablet Take 1 Tablet by mouth Before breakfast and dinner. 60 Tablet 0    OXYGEN-AIR DELIVERY SYSTEMS 2 L/min by Nasal route daily as needed for PRN Reason (Other) (SOB/wheezing).  dextran 70/hypromellose (ARTIFICIAL TEARS, PF, OP) Administer 2 Drops to both eyes daily as needed for Other (dry eyes).          Past History       Past Medical History:  Past Medical History:   Diagnosis Date    BMI 30.0-30.9,adult 4/2/2022    Brain aneurysm     Brain aneurysm     Cancer [facility])     prostate    CHF (congestive heart failure) (HCC)     CKD (chronic kidney disease)     Closed nondisplaced supracondylar fracture of lower end of left femur without intracondylar extension with delayed healing     COPD [facility] disease) [facility] [facility]     Debility     History of home oxygen therapy     [place]hard of hearing[place]     Hypertension     Nocturia     On home oxygen therapy     PAF (paroxysmal atrial fibrillation) (HCC)     Pneumonia     Prostate CA ([facility]     Prostate neoplasm     Radiation effect        Past Surgical History:  Past Surgical History:   Procedure Laterality Date    HX HERNIA REPAIR      HX HIP ARTHROSCOPY  04/09/2021       Family History:  Family History   Problem Relation Age of Onset    Heart Disease Mother        Social History:  Social History     Tobacco Use    Smoking status: Former Smoker     Types: Cigarettes    Smokeless tobacco: Never Used   Substance Use Topics    Alcohol use: No    Drug use: Never       Allergies: Allergies   Allergen Reactions    Aspirin Other (comments)     \"messes my stomach up\"    Bactrim [Sulfamethoxazole-Trimethoprim] Unknown (comments)    Nsaids (Non-Steroidal Anti-Inflammatory Drug) Unknown (comments)         Review of Systems   Review of Systems   Constitutional: Negative for fever. Respiratory: Positive for cough, shortness of breath and wheezing. Cardiovascular: Negative for chest pain. Gastrointestinal: Negative for abdominal pain. All other systems reviewed and are negative.         Physical Exam     Vitals:    05/23/22 1837 05/23/22 1930 05/23/22 1945 05/23/22 1955   BP:  (!) 110/56     Pulse: (!) 106 (!) 103 (!) 103 (!) 102   Resp: 24 18 18 20   Temp:       SpO2: 100% 98% 99% 100%   Weight:         Physical Exam    Nursing notes and vital signs reviewed    Constitutional: Non toxic appearing, moderate distress  Head: Normocephalic, Atraumatic  Eyes: EOMI  Neck: Supple  Cardiovascular: Tachycardic and regular rhythm, no murmurs, rubs, or gallops  Chest: Normal work of breathing and chest excursion bilaterally  Lungs: Scattered expiratory wheezes to ausculation bilaterally  Abdomen: Soft, non tender, non distended  Back: No evidence of trauma or deformity  Extremities: No evidence of trauma or deformity, mild bilateral symmetric LE edema  Skin: Warm and dry, normal cap refill  Neuro: Alert and appropriate  Psychiatric: Normal mood and affect      Diagnostic Study Results     Labs -     Recent Results (from the past 12 hour(s))   CBC WITH AUTOMATED DIFF    Collection Time: 05/23/22  6:02 PM   Result Value Ref Range    WBC 12.3 4.6 - 13.2 K/uL    RBC 3.21 (L) 4.35 - 5.65 M/uL    HGB 9.1 (L) 13.0 - 16.0 g/dL    HCT 29.1 (L) 36.0 - 48.0 %    MCV 90.7 78.0 - 100.0 FL    MCH 28.3 24.0 - 34.0 PG    MCHC 31.3 31.0 - 37.0 g/dL    RDW 14.6 (H) 11.6 - 14.5 %    PLATELET 822 492 - 363 K/uL    MPV 8.5 (L) 9.2 - 11.8 FL    NRBC 0.0 0  WBC    ABSOLUTE NRBC 0.00 0.00 - 0.01 K/uL    NEUTROPHILS 96 (H) 40 - 73 %    LYMPHOCYTES 2 (L) 21 - 52 %    MONOCYTES 1 (L) 3 - 10 %    EOSINOPHILS 0 0 - 5 %    BASOPHILS 0 0 - 2 %    IMMATURE GRANULOCYTES 2 (H) 0.0 - 0.5 %    ABS. NEUTROPHILS 11.8 (H) 1.8 - 8.0 K/UL    ABS. LYMPHOCYTES 0.2 (L) 0.9 - 3.6 K/UL    ABS. MONOCYTES 0.1 0.05 - 1.2 K/UL    ABS. EOSINOPHILS 0.0 0.0 - 0.4 K/UL    ABS. BASOPHILS 0.0 0.0 - 0.1 K/UL    ABS. IMM. GRANS. 0.2 (H) 0.00 - 0.04 K/UL    DF AUTOMATED     METABOLIC PANEL, COMPREHENSIVE    Collection Time: 05/23/22  6:02 PM   Result Value Ref Range    Sodium 140 136 - 145 mmol/L    Potassium 4.0 3.5 - 5.5 mmol/L    Chloride 106 100 - 111 mmol/L    CO2 25 21 - 32 mmol/L    Anion gap 9 3.0 - 18 mmol/L    Glucose 127 (H) 74 - 99 mg/dL    BUN 34 (H) 7.0 - 18 MG/DL    Creatinine 1.61 (H) 0.6 - 1.3 MG/DL    BUN/Creatinine ratio 21 (H) 12 - 20      GFR est AA 51 (L) >60 ml/min/1.73m2    GFR est non-AA 42 (L) >60 ml/min/1.73m2    Calcium 8.0 (L) 8.5 - 10.1 MG/DL    Bilirubin, total 0.3 0.2 - 1.0 MG/DL    ALT (SGPT) 16 16 - 61 U/L    AST (SGOT) 10 10 - 38 U/L    Alk.  phosphatase 80 45 - 117 U/L    Protein, total 6.5 6.4 - 8.2 g/dL    Albumin 3.1 (L) 3.4 - 5.0 g/dL    Globulin 3.4 2.0 - 4.0 g/dL    A-G Ratio 0.9 0.8 - 1.7     TROPONIN-HIGH SENSITIVITY    Collection Time: 05/23/22  6:02 PM   Result Value Ref Range    Troponin-High Sensitivity 6 0 - 78 ng/L   EKG, 12 LEAD, INITIAL    Collection Time: 05/23/22  7:41 PM   Result Value Ref Range    Ventricular Rate 103 BPM    Atrial Rate 103 BPM    P-R Interval 156 ms    QRS Duration 82 ms    Q-T Interval 352 ms    QTC Calculation (Bezet) 461 ms    Calculated P Axis 59 degrees    Calculated R Axis 25 degrees    Calculated T Axis 56 degrees    Diagnosis       Sinus tachycardia  Otherwise normal ECG  When compared with ECG of 23-MAY-2022 17:51,  No significant change was found     TROPONIN-HIGH SENSITIVITY Collection Time: 05/23/22  7:42 PM   Result Value Ref Range    Troponin-High Sensitivity 5 0 - 78 ng/L       Radiologic Studies -   XR CHEST PORT    (Results Pending)   X-RAY FINDINGS:  9:48 PM  Chest x-ray shows no acute process, similar compared to prior from May 13, 2022  Read by Dr. Gabino Zambrano, Pending review by Radiologist  CT Results  (Last 48 hours)    None        CXR Results  (Last 48 hours)    None          Medications given in the ED-  Medications   methylPREDNISolone (PF) (Solu-MEDROL) injection 125 mg (125 mg IntraVENous Given 5/23/22 1802)   albuterol-ipratropium (DUO-NEB) 2.5 MG-0.5 MG/3 ML (3 mL Nebulization Given 5/23/22 1802)   sodium chloride 0.9 % bolus infusion 1,000 mL (0 mL IntraVENous IV Completed 5/23/22 2138)         Medical Decision Making   I am the first provider for this patient. I reviewed the vital signs, available nursing notes, past medical history, past surgical history, family history and social history. Vital Signs-Reviewed the patient's vital signs. Pulse Oximetry Analysis - 100% on home 2 L nasal cannula, not hypoxic     Cardiac Monitor:  Rate: 103 bpm  Rhythm: Sinus tachycardia    EKG interpretation: (Preliminary)  EKG read by Dr. Gabino Zambrano at 5:54 PM  Sinus tachycardia at a rate of 105 bpm, MT interval 152 ms, QRS duration 80 ms, similar when compared to prior from May 13, 2022    Repeat EKG interpretation: (Preliminary)  EKG read by Dr. Gabino Zambrano at 7:46 PM  Sinus tachycardia at a rate of 103 bpm, MT interval 156 ms, QRS duration of 82 ms, similar compared to prior from earlier today    Records Reviewed: Nursing Notes, Old Medical Records and Previous electrocardiograms    Provider Notes (Medical Decision Making): Nickolas Nesbitt is a 64 y.o. male presenting for wheezing and shortness of breath similar to prior COPD exacerbations. Patient is satting well on his home 2 L nasal cannula. After steroids and a nebulizer treatment here his wheezing has resolved. Had CTA of the chest 10 days ago but also had no acute process. High-sensitivity troponin negative x2. Mildly tachycardic but improved here with some gentle IV hydration. Will treat as COPD exacerbation with continued steroids and antibiotics at home and referrals to primary care and pulmonology for follow-up with return precautions. Patient understands and agrees with this plan. Procedures:  Procedures    ED Course:   8:45 PM  Updated patient on all results and plan. All questions answered. And is resting comfortably without respiratory distress. His heart rate has normalized and is currently in the 90s. He is eager for discharge and return to home. Diagnosis and Disposition     Critical Care: None    DISCHARGE NOTE:    Fay Park's  results have been reviewed with him. He has been counseled regarding his diagnosis, treatment, and plan. He verbally conveys understanding and agreement of the signs, symptoms, diagnosis, treatment and prognosis and additionally agrees to follow up as discussed. He also agrees with the care-plan and conveys that all of his questions have been answered. I have also provided discharge instructions for him that include: educational information regarding their diagnosis and treatment, and list of reasons why they would want to return to the ED prior to their follow-up appointment, should his condition change. He has been provided with education for proper emergency department utilization. CLINICAL IMPRESSION:    1. SOB (shortness of breath)    2. Acute exacerbation of chronic obstructive pulmonary disease (COPD) ([facility] [address]        PLAN:  1. D/C Home  2. Discharge Medication List as of 5/23/2022  8:45 PM      START taking these medications    Details   doxycycline (VIBRA-TABS) 100 mg tablet Take 1 Tablet by mouth two (2) times a day for 7 days. , Normal, Disp-14 Tablet, R-0      predniSONE (STERAPRED DS) 10 mg dose pack 6 day dose pack per package instructions, Normal, Disp-1 Dose Pack, R-0         CONTINUE these medications which have NOT CHANGED    Details   albuterol (PROVENTIL HFA, VENTOLIN HFA, PROAIR HFA) 90 mcg/actuation inhaler Take 2 Puffs by inhalation every four (4) hours as needed for Wheezing or Shortness of Breath., Normal, Disp-1 Each, R-3      DAPTOMYCIN  mg by IntraVENous route daily. in 25mL NS, Historical Med      sodium chloride (Normal Saline Flush) 5-10 mL by IntraVENous route daily. flush IV catheter with 10ml before and after infusing each dose of medication as directed., Historical Med      heparin sodium,porcine (HEPARIN LOCK FLUSH IV) 3 mL by IntraVENous route daily. flush IV catheter with 3ml of heparin 10units/mL after the last dose of 0.9% sodium chloride flush, after each dose of medication or as directed. flush IV catheter every day if not in use., Historical Med      ferrous sulfate 325 mg (65 mg iron) tablet Take 1 Tablet by mouth two (2) times daily (with meals). , Normal, Disp-60 Tablet, R-3      apixaban (ELIQUIS) 5 mg tablet Take 1 Tablet by mouth two (2) times a day., Normal, Disp-60 Tablet, R-2      tamsulosin (FLOMAX) 0.4 mg capsule Take 1 Capsule by mouth every evening., Normal, Disp-30 Capsule, R-3      furosemide (Lasix) 20 mg tablet Take 1 Tablet by mouth daily. , Normal, Disp-30 Tablet, R-3      amLODIPine (NORVASC) 10 mg tablet Take 1 Tablet by mouth daily. , Normal, Disp-30 Tablet, R-3      albuterol-ipratropium (DUO-NEB) 2.5 mg-0.5 mg/3 ml nebu 3 mL by Nebulization route every four (4) hours as needed for Wheezing., Normal, Disp-30 Nebule, R-3      pantoprazole (PROTONIX) 40 mg tablet Take 1 Tablet by mouth Before breakfast and dinner., Normal, Disp-60 Tablet, R-0      OXYGEN-AIR DELIVERY SYSTEMS 2 L/min by Nasal route daily as needed for PRN Reason (Other) (SOB/wheezing). , Historical Med      dextran 70/hypromellose (ARTIFICIAL TEARS, PF, OP) Administer 2 Drops to both eyes daily as needed for Other (dry eyes). , Historical Med           3. Follow-up Information     Follow up With Specialties Details Why Contact Info    Alon Baldwin MD Family Medicine Schedule an appointment as soon as possible for a visit   [address] [id number] [facility]      Lisa Gutierrez MD Pulmonary Disease Schedule an appointment as soon as possible for a visit   [address] [facility] [address]      [facility] EMERGENCY DEPT Emergency Medicine  If symptoms worsen [address] [facility] [id number]  [phone]        _______________________________      Please note that this dictation was completed with [employer], the computer voice recognition software. Quite often unanticipated grammatical, syntax, homophones, and other interpretive errors are inadvertently transcribed by the computer software. Please disregard these errors. Please excuse any errors that have escaped final proofreading.

## 2022-05-24 ENCOUNTER — HOSPITAL ENCOUNTER (EMERGENCY)
Age: 77
Discharge: HOME | End: 2022-05-24
Attending: EMERGENCY MEDICINE
Payer: COMMERCIAL

## 2022-05-24 ENCOUNTER — APPOINTMENT (OUTPATIENT)
Dept: GENERAL RADIOLOGY | Age: 77
End: 2022-05-24
Attending: EMERGENCY MEDICINE
Payer: COMMERCIAL

## 2022-05-24 VITALS
RESPIRATION RATE: 17 BRPM | DIASTOLIC BLOOD PRESSURE: 62 MMHG | SYSTOLIC BLOOD PRESSURE: 149 MMHG | TEMPERATURE: 98.6 F | OXYGEN SATURATION: 100 % | HEART RATE: 97 BPM

## 2022-05-24 DIAGNOSIS — J44.9 CHRONIC OBSTRUCTIVE PULMONARY DISEASE, UNSPECIFIED COPD TYPE (HCC): Primary | ICD-10-CM

## 2022-05-24 DIAGNOSIS — R41.0 CONFUSION: ICD-10-CM

## 2022-05-24 LAB
ALBUMIN SERPL-MCNC: 3 G/DL (ref 3.4–5)
ALBUMIN/GLOB SERPL: 1 {RATIO} (ref 0.8–1.7)
ALP SERPL-CCNC: 72 U/L (ref 45–117)
ALT SERPL-CCNC: 16 U/L (ref 16–61)
ANION GAP SERPL CALC-SCNC: 9 MMOL/L (ref 3–18)
AST SERPL-CCNC: 10 U/L (ref 10–38)
ATRIAL RATE: 103 BPM
ATRIAL RATE: 105 BPM
ATRIAL RATE: 109 BPM
BASOPHILS # BLD: 0 K/UL (ref 0–0.1)
BASOPHILS NFR BLD: 0 % (ref 0–2)
BILIRUB SERPL-MCNC: 0.2 MG/DL (ref 0.2–1)
BNP SERPL-MCNC: 916 PG/ML (ref 0–1800)
BUN SERPL-MCNC: 39 MG/DL (ref 7–18)
BUN/CREAT SERPL: 26 (ref 12–20)
CALCIUM SERPL-MCNC: 7.9 MG/DL (ref 8.5–10.1)
CALCULATED P AXIS, ECG09: 51 DEGREES
CALCULATED P AXIS, ECG09: 58 DEGREES
CALCULATED P AXIS, ECG09: 59 DEGREES
CALCULATED R AXIS, ECG10: 21 DEGREES
CALCULATED R AXIS, ECG10: 25 DEGREES
CALCULATED R AXIS, ECG10: 27 DEGREES
CALCULATED T AXIS, ECG11: 50 DEGREES
CALCULATED T AXIS, ECG11: 56 DEGREES
CALCULATED T AXIS, ECG11: 62 DEGREES
CHLORIDE SERPL-SCNC: 105 MMOL/L (ref 100–111)
CO2 SERPL-SCNC: 25 MMOL/L (ref 21–32)
CREAT SERPL-MCNC: 1.49 MG/DL (ref 0.6–1.3)
D DIMER PPP FEU-MCNC: 0.28 UG/ML(FEU)
DIAGNOSIS, 93000: NORMAL
DIFFERENTIAL METHOD BLD: ABNORMAL
EOSINOPHIL # BLD: 0 K/UL (ref 0–0.4)
EOSINOPHIL NFR BLD: 0 % (ref 0–5)
ERYTHROCYTE [DISTWIDTH] IN BLOOD BY AUTOMATED COUNT: 14.7 % (ref 11.6–14.5)
GLOBULIN SER CALC-MCNC: 3 G/DL (ref 2–4)
GLUCOSE SERPL-MCNC: 116 MG/DL (ref 74–99)
HCT VFR BLD AUTO: 27.9 % (ref 36–48)
HGB BLD-MCNC: 8.8 G/DL (ref 13–16)
IMM GRANULOCYTES # BLD AUTO: 0.2 K/UL (ref 0–0.04)
IMM GRANULOCYTES NFR BLD AUTO: 2 % (ref 0–0.5)
LYMPHOCYTES # BLD: 0.5 K/UL (ref 0.9–3.6)
LYMPHOCYTES NFR BLD: 5 % (ref 21–52)
MAGNESIUM SERPL-MCNC: 1.9 MG/DL (ref 1.6–2.6)
MCH RBC QN AUTO: 28.1 PG (ref 24–34)
MCHC RBC AUTO-ENTMCNC: 31.5 G/DL (ref 31–37)
MCV RBC AUTO: 89.1 FL (ref 78–100)
MONOCYTES # BLD: 0.5 K/UL (ref 0.05–1.2)
MONOCYTES NFR BLD: 5 % (ref 3–10)
NEUTS SEG # BLD: 8.9 K/UL (ref 1.8–8)
NEUTS SEG NFR BLD: 89 % (ref 40–73)
NRBC # BLD: 0 K/UL (ref 0–0.01)
NRBC BLD-RTO: 0 PER 100 WBC
P-R INTERVAL, ECG05: 152 MS
P-R INTERVAL, ECG05: 154 MS
P-R INTERVAL, ECG05: 156 MS
PLATELET # BLD AUTO: 347 K/UL (ref 135–420)
PMV BLD AUTO: 8.7 FL (ref 9.2–11.8)
POTASSIUM SERPL-SCNC: 3.7 MMOL/L (ref 3.5–5.5)
PROT SERPL-MCNC: 6 G/DL (ref 6.4–8.2)
Q-T INTERVAL, ECG07: 338 MS
Q-T INTERVAL, ECG07: 346 MS
Q-T INTERVAL, ECG07: 352 MS
QRS DURATION, ECG06: 80 MS
QRS DURATION, ECG06: 82 MS
QRS DURATION, ECG06: 86 MS
QTC CALCULATION (BEZET), ECG08: 446 MS
QTC CALCULATION (BEZET), ECG08: 461 MS
QTC CALCULATION (BEZET), ECG08: 465 MS
RBC # BLD AUTO: 3.13 M/UL (ref 4.35–5.65)
SODIUM SERPL-SCNC: 139 MMOL/L (ref 136–145)
TROPONIN-HIGH SENSITIVITY: 7 NG/L (ref 0–78)
TROPONIN-HIGH SENSITIVITY: 8 NG/L (ref 0–78)
VENTRICULAR RATE, ECG03: 103 BPM
VENTRICULAR RATE, ECG03: 105 BPM
VENTRICULAR RATE, ECG03: 109 BPM
WBC # BLD AUTO: 10 K/UL (ref 4.6–13.2)

## 2022-05-24 PROCEDURE — 83880 ASSAY OF NATRIURETIC PEPTIDE: CPT

## 2022-05-24 PROCEDURE — 99285 EMERGENCY DEPT VISIT HI MDM: CPT

## 2022-05-24 PROCEDURE — 93005 ELECTROCARDIOGRAM TRACING: CPT

## 2022-05-24 PROCEDURE — 84484 ASSAY OF TROPONIN QUANT: CPT

## 2022-05-24 PROCEDURE — 71045 X-RAY EXAM CHEST 1 VIEW: CPT

## 2022-05-24 PROCEDURE — 85025 COMPLETE CBC W/AUTO DIFF WBC: CPT

## 2022-05-24 PROCEDURE — 85379 FIBRIN DEGRADATION QUANT: CPT

## 2022-05-24 PROCEDURE — 83735 ASSAY OF MAGNESIUM: CPT

## 2022-05-24 PROCEDURE — 80053 COMPREHEN METABOLIC PANEL: CPT

## 2022-05-24 NOTE — ED PROVIDER NOTES
EMERGENCY DEPARTMENT HISTORY AND PHYSICAL EXAM    Date: 5/24/2022  Patient Name: Delphine Atwood    History of Presenting Illness     Chief Complaint   Patient presents with    Shortness of Breath       History Provided By: Patient and EMS     History Stewart Iris):   1:19 PM  Delphine Atwood is a 64 y.o. male with a PMHX of COPD, CHF, CKD, hypertension who presents to the emergency department by EMS C/O shortness of breath onset today. Associated sxs include confusion. Pt denies any other sxs or complaints. Patient states that he is confused. He is on 2 LPM oxygen by nasal cannula (baseline). Patient was seen in this ED yesterday from 5:44 PM to 9:37 PM after he called 911. He was given Solu-Medrol, duo nebs and had serial troponin draws (6 ng/L followed by 5 ng/L). He was subsequently discharged home. He remained short of breath today and his family called 911. Chief Complaint: Dyspnea  Onset: Yesterday  Timing:  Acute on Chronic  Context: Symptoms started spontaneously, symptoms have progressively worsened since onset  Location: Lungs  Quality: Tightness  Severity: Moderate  Modifying Factors: Nothing makes it better, or worse.   Associated Symptoms: Confusion    PCP: Miriam Cantu MD     Past History         Past Medical History:  Past Medical History:   Diagnosis Date    BMI 30.0-30.9,adult 4/2/2022    Brain aneurysm     Brain aneurysm     Cancer [facility])     prostate    CHF (congestive heart failure) (HCC)     CKD (chronic kidney disease)     Closed nondisplaced supracondylar fracture of lower end of left femur without intracondylar extension with delayed healing     COPD (chronic obstructive pulmonary disease) ([address]     Debility     History of home oxygen therapy     [place] (hard of hearing)     Hypertension     Nocturia     On home oxygen therapy     PAF (paroxysmal atrial fibrillation) ([address]     Pneumonia     Prostate CA ([address]     Prostate neoplasm     Radiation effect        Past Surgical History:  Past Surgical History:   Procedure Laterality Date    HX HERNIA REPAIR      HX HIP ARTHROSCOPY  04/09/2021       Family History:  Family History   Problem Relation Age of Onset    Heart Disease Mother    Reviewed and non-contributory    Social History:  Social History     Tobacco Use    Smoking status: Former Smoker     Types: Cigarettes    Smokeless tobacco: Never Used   Substance Use Topics    Alcohol use: No    Drug use: Never       Medications:  Current Outpatient Medications   Medication Sig Dispense Refill    doxycycline (VIBRA-TABS) 100 mg tablet Take 1 Tablet by mouth two (2) times a day for 7 days. 14 Tablet 0    predniSONE (STERAPRED DS) 10 mg dose pack 6 day dose pack per package instructions 1 Dose Pack 0    albuterol (PROVENTIL HFA, VENTOLIN HFA, PROAIR HFA) 90 mcg/actuation inhaler Take 2 Puffs by inhalation every four (4) hours as needed for Wheezing or Shortness of Breath. 1 Each 3    DAPTOMYCIN  mg by IntraVENous route daily. in 25mL NS      sodium chloride (Normal Saline Flush) 5-10 mL by IntraVENous route daily. flush IV catheter with 10ml before and after infusing each dose of medication as directed.  heparin sodium,porcine (HEPARIN LOCK FLUSH IV) 3 mL by IntraVENous route daily. flush IV catheter with 3ml of heparin 10units/mL after the last dose of 0.9% sodium chloride flush, after each dose of medication or as directed. flush IV catheter every day if not in use.  ferrous sulfate 325 mg (65 mg iron) tablet Take 1 Tablet by mouth two (2) times daily (with meals). 60 Tablet 3    apixaban (ELIQUIS) 5 mg tablet Take 1 Tablet by mouth two (2) times a day. 60 Tablet 2    tamsulosin (FLOMAX) 0.4 mg capsule Take 1 Capsule by mouth every evening. 30 Capsule 3    furosemide (Lasix) 20 mg tablet Take 1 Tablet by mouth daily. 30 Tablet 3    amLODIPine (NORVASC) 10 mg tablet Take 1 Tablet by mouth daily.  30 Tablet 3    albuterol-ipratropium (DUO-NEB) 2.5 mg-0.5 mg/3 ml nebu 3 mL by Nebulization route every four (4) hours as needed for Wheezing. 30 Nebule 3    pantoprazole (PROTONIX) 40 mg tablet Take 1 Tablet by mouth Before breakfast and dinner. 60 Tablet 0    OXYGEN-AIR DELIVERY SYSTEMS 2 L/min by Nasal route daily as needed for PRN Reason (Other) (SOB/wheezing).  dextran 70/hypromellose (ARTIFICIAL TEARS, PF, OP) Administer 2 Drops to both eyes daily as needed for Other (dry eyes). Allergies: Allergies   Allergen Reactions    Aspirin Other (comments)     \"messes my stomach up\"    Bactrim [Sulfamethoxazole-Trimethoprim] Unknown (comments)    Nsaids (Non-Steroidal Anti-Inflammatory Drug) Unknown (comments)       Review of Systems      Review of Systems   Constitutional: Negative for chills and fever. HENT: Negative for rhinorrhea and sore throat. Eyes: Negative for pain and visual disturbance. Respiratory: Positive for shortness of breath. Negative for chest tightness and wheezing. Cardiovascular: Negative for chest pain and palpitations. Gastrointestinal: Negative for abdominal pain, diarrhea, nausea and vomiting. Musculoskeletal: Negative for arthralgias and myalgias. Skin: Negative for rash and wound. Neurological: Negative for speech difficulty, light-headedness and headaches. Psychiatric/Behavioral: Positive for confusion. Negative for agitation. All other systems reviewed and are negative. Physical Exam     Vitals:    05/24/22 1547 05/24/22 1557 05/24/22 1645 05/24/22 1716   BP: (!) 152/61 (!) 133/56  (!) 148/64   Pulse: (!) 104 (!) 102 98    Resp:       Temp:       SpO2: 100% 99% 100% 100%       Physical Exam  Vitals and nursing note reviewed. Constitutional:       General: He is not in acute distress. Appearance: Normal appearance. He is normal weight. He is not ill-appearing. HENT:      Head: Normocephalic and atraumatic. Nose: Nose normal. No rhinorrhea.       Mouth/Throat:      Mouth: Mucous membranes are moist.      Pharynx: No oropharyngeal exudate or posterior oropharyngeal erythema. Eyes:      Extraocular Movements: Extraocular movements intact. Conjunctiva/sclera: Conjunctivae normal.      Pupils: Pupils are equal, round, and reactive to light. Cardiovascular:      Rate and Rhythm: Regular rhythm. Tachycardia present. Heart sounds: No murmur heard. No friction rub. No gallop. Pulmonary:      Effort: Pulmonary effort is normal. No respiratory distress. Breath sounds: Normal breath sounds. No wheezing, rhonchi or rales. Abdominal:      General: Bowel sounds are normal.      Palpations: Abdomen is soft. Tenderness: There is no abdominal tenderness. There is no guarding or rebound. Musculoskeletal:         General: No swelling, tenderness or deformity. Normal range of motion. Cervical back: Normal range of motion and neck supple. No rigidity. Lymphadenopathy:      Cervical: No cervical adenopathy. Skin:     General: Skin is warm and dry. Findings: No rash. Neurological:      General: No focal deficit present. Mental Status: He is alert and oriented to person, place, and time.    Psychiatric:         Mood and Affect: Mood normal.         Behavior: Behavior normal.         Diagnostic Study Results     Labs -  Recent Results (from the past 12 hour(s))   CBC WITH AUTOMATED DIFF    Collection Time: 05/24/22  1:15 PM   Result Value Ref Range    WBC 10.0 4.6 - 13.2 K/uL    RBC 3.13 (L) 4.35 - 5.65 M/uL    HGB 8.8 (L) 13.0 - 16.0 g/dL    HCT 27.9 (L) 36.0 - 48.0 %    MCV 89.1 78.0 - 100.0 FL    MCH 28.1 24.0 - 34.0 PG    MCHC 31.5 31.0 - 37.0 g/dL    RDW 14.7 (H) 11.6 - 14.5 %    PLATELET 663 233 - 238 K/uL    MPV 8.7 (L) 9.2 - 11.8 FL    NRBC 0.0 0  WBC    ABSOLUTE NRBC 0.00 0.00 - 0.01 K/uL    NEUTROPHILS 89 (H) 40 - 73 %    LYMPHOCYTES 5 (L) 21 - 52 %    MONOCYTES 5 3 - 10 %    EOSINOPHILS 0 0 - 5 %    BASOPHILS 0 0 - 2 %    IMMATURE GRANULOCYTES 2 (H) 0.0 - 0.5 %    ABS. NEUTROPHILS 8.9 (H) 1.8 - 8.0 K/UL    ABS. LYMPHOCYTES 0.5 (L) 0.9 - 3.6 K/UL    ABS. MONOCYTES 0.5 0.05 - 1.2 K/UL    ABS. EOSINOPHILS 0.0 0.0 - 0.4 K/UL    ABS. BASOPHILS 0.0 0.0 - 0.1 K/UL    ABS. IMM. GRANS. 0.2 (H) 0.00 - 0.04 K/UL    DF AUTOMATED     D DIMER    Collection Time: 05/24/22  1:15 PM   Result Value Ref Range    D DIMER 0.28 <0.46 ug/ml(FEU)   METABOLIC PANEL, COMPREHENSIVE    Collection Time: 05/24/22  1:15 PM   Result Value Ref Range    Sodium 139 136 - 145 mmol/L    Potassium 3.7 3.5 - 5.5 mmol/L    Chloride 105 100 - 111 mmol/L    CO2 25 21 - 32 mmol/L    Anion gap 9 3.0 - 18 mmol/L    Glucose 116 (H) 74 - 99 mg/dL    BUN 39 (H) 7.0 - 18 MG/DL    Creatinine 1.49 (H) 0.6 - 1.3 MG/DL    BUN/Creatinine ratio 26 (H) 12 - 20      GFR est AA 55 (L) >60 ml/min/1.73m2    GFR est non-AA 46 (L) >60 ml/min/1.73m2    Calcium 7.9 (L) 8.5 - 10.1 MG/DL    Bilirubin, total 0.2 0.2 - 1.0 MG/DL    ALT (SGPT) 16 16 - 61 U/L    AST (SGOT) 10 10 - 38 U/L    Alk. phosphatase 72 45 - 117 U/L    Protein, total 6.0 (L) 6.4 - 8.2 g/dL    Albumin 3.0 (L) 3.4 - 5.0 g/dL    Globulin 3.0 2.0 - 4.0 g/dL    A-G Ratio 1.0 0.8 - 1.7     NT-PRO BNP    Collection Time: 05/24/22  1:15 PM   Result Value Ref Range    NT pro- 0 - 1,800 PG/ML   TROPONIN-HIGH SENSITIVITY    Collection Time: 05/24/22  1:15 PM   Result Value Ref Range    Troponin-High Sensitivity 7 0 - 78 ng/L   MAGNESIUM    Collection Time: 05/24/22  1:15 PM   Result Value Ref Range    Magnesium 1.9 1.6 - 2.6 mg/dL   TROPONIN-HIGH SENSITIVITY    Collection Time: 05/24/22  3:45 PM   Result Value Ref Range    Troponin-High Sensitivity 8 0 - 78 ng/L       Radiologic Studies -   XR CHEST PORT   Final Result      No significant interval change or acute pulmonary process identified.          CT Results  (Last 48 hours)    None        CXR Results  (Last 48 hours)               05/24/22 1357  XR CHEST PORT Final result    Impression:      No significant interval change or acute pulmonary process identified. Narrative:  EXAM: One-view chest       CLINICAL HISTORY: dyspnea ,       COMPARISON: Prior plain films       FINDINGS:       Frontal view of the chest demonstrate pleural-based calcifications in the right   mid and lower lung. Stable streaky opacities in the lung bases. . Cardiac   silhouette is normal in size and contour. No acute bony or soft tissue   abnormality. 05/23/22 1811  XR CHEST PORT Final result    Impression:  Increased interstitial lung markings with prominence of the central   pulmonary vasculature and airspace disease at both lung bases. Findings   suggestive of pulmonary edema and/or pneumonia. This appears to have worsened   since the prior exam.       Narrative:  EXAM:  AP Portable Chest X-ray 1 view        INDICATION: Shortness of breath       COMPARISON: May 13, 2022       _______________       FINDINGS:  Heart and mediastinal contours are stable. There are increased   interstitial lung markings bilaterally with suggestion of airspace disease in   both lower lobes, right greater than left. There is prominence of the central   pulmonary vasculature. Teardrop shaped pleural calcification seen on the right. There are no pleural effusions. No acute osseous findings. ________________                     Medications given in the ED-  Medications - No data to display    Procedures     Procedures    ED Course     I am the first provider for this patient. I reviewed the vital signs, available nursing notes, past medical history, past surgical history, family history and social history. Records Reviewed: Nursing Notes    Cardiac Monitor:  Rate: 111 bpm  Rhythm: tachycardic rhythm    Pulse Oximetry Analysis - 100% on RA    EKG interpretation: (Preliminary)  Rhythm: Sinus tachycardia. Rate: 109 bpm; no STEMI  EKG read by Eligio Voss MD at 1:09 PM    1:19 PM Initial assessment performed.  The patients presenting problems have been discussed, and they are in agreement with the care plan formulated and outlined with them. I have encouraged them to ask questions as they arise throughout their visit. ED Course as of 05/24/22 1847   Tue May 24, 2022   1811 Multiple calls were made to the family who did not answer the phone. The nurse made call to 4-5 family members. Ultimately a daughter in [place] came down to pick him up. APS was notified of the situation. [JM]      ED Course User Index  [JM] Kai Gotti MD         Medical Decision Making     Provider Notes (Medical Decision Making):   DDX: COPD, unlikely ACS, URI, pneumonia, unlikely PE, dementia    Discussion:  64 y.o. male with recurrent COPD. Patient is on his baseline oxygen saturations in the ED. He had 2 troponins which were not markedly increasing and in the intermediate range making ACS very unlikely. His lungs were clear and he was moving good air bilaterally. He did not require nebulizer treatment in the ED. Patient does not meet criteria for hospital admission at this time. If the family would like work-up for dementia or placement they should discuss with her primary care doctor. Patient understands and agrees with the plan. Diagnosis and Disposition     DISCHARGE NOTE:  4:54 PM   [place]'s  results have been reviewed with him. He has been counseled regarding his diagnosis, treatment, and plan. He verbally conveys understanding and agreement of the signs, symptoms, diagnosis, treatment and prognosis and additionally agrees to follow up as discussed. He also agrees with the care-plan and conveys that all of his questions have been answered. I have also provided discharge instructions for him that include: educational information regarding their diagnosis and treatment, and list of reasons why they would want to return to the ED prior to their follow-up appointment, should his condition change.  He has been provided with education for proper emergency department utilization. CLINICAL IMPRESSION:    1. Chronic obstructive pulmonary disease, unspecified COPD type ([facility]    2. Confusion        PLAN:  1. D/C Home  2. Current Discharge Medication List        3. Follow-up Information     Follow up With Specialties Details Why Contact Info    Tammy Downey MD Family Medicine Schedule an appointment as soon as possible for a visit  As soon as possible, For follow up from Emergency Department visit. [facility]      THE [facility] EMERGENCY DEPT Emergency Medicine  As needed; If symptoms worsen [address] [id number]  [address]     Please note that this dictation was completed with [employer], the computer voice recognition software. Quite often unanticipated grammatical, syntax, homophones, and other interpretive errors are inadvertently transcribed by the computer software. Please disregard these errors. Please excuse any errors that have escaped final proofreading.     Lis Bush MD

## 2022-05-24 NOTE — ED TRIAGE NOTES
Pt arrived via EMS with c/o SOB. Hx COPD and was just seen in the ED and discharged home. Family activated 911. Pt is on home o2 @ 2L/min via NC. Pt very hard of hearing.

## 2022-05-24 NOTE — DISCHARGE INSTRUCTIONS
Follow-up with your primary care doctor for confusion, consideration of dementia testing or for assistance with organizing placement if desired. Return to the ED for worsening symptoms, chest pain, new shortness of breath, hypoxia, or for other concerns.

## 2022-05-24 NOTE — ED NOTES
Pt is being discharged, left a message on all family members on chart. Pt's son Christianne Shah is at work and unable to pick him up. Son Abilio Hua called back and is unable to pick him up. He will be calling the patients Wife Davon Reyes who I have left already left a  Message with.

## 2022-07-20 ENCOUNTER — HOSPITAL ENCOUNTER (EMERGENCY)
Age: 77
Discharge: HOME | End: 2022-07-20
Attending: EMERGENCY MEDICINE
Payer: COMMERCIAL

## 2022-07-20 ENCOUNTER — APPOINTMENT (OUTPATIENT)
Dept: GENERAL RADIOLOGY | Age: 77
End: 2022-07-20
Attending: EMERGENCY MEDICINE
Payer: COMMERCIAL

## 2022-07-20 VITALS
SYSTOLIC BLOOD PRESSURE: 165 MMHG | OXYGEN SATURATION: 97 % | WEIGHT: 200 LBS | RESPIRATION RATE: 20 BRPM | BODY MASS INDEX: 30.31 KG/M2 | HEIGHT: 68 IN | DIASTOLIC BLOOD PRESSURE: 77 MMHG | TEMPERATURE: 99.1 F | HEART RATE: 75 BPM

## 2022-07-20 DIAGNOSIS — J44.1 CHRONIC OBSTRUCTIVE PULMONARY DISEASE WITH ACUTE EXACERBATION (HCC): Primary | ICD-10-CM

## 2022-07-20 LAB
ALBUMIN SERPL-MCNC: 3 G/DL (ref 3.4–5)
ALBUMIN/GLOB SERPL: 1 {RATIO} (ref 0.8–1.7)
ALP SERPL-CCNC: 109 U/L (ref 45–117)
ALT SERPL-CCNC: 12 U/L (ref 16–61)
ANION GAP SERPL CALC-SCNC: 9 MMOL/L (ref 3–18)
ARTERIAL PATENCY WRIST A: POSITIVE
AST SERPL-CCNC: 12 U/L (ref 10–38)
BASE DEFICIT BLD-SCNC: 2.5 MMOL/L
BASOPHILS # BLD: 0.1 K/UL (ref 0–0.1)
BASOPHILS NFR BLD: 1 % (ref 0–2)
BDY SITE: ABNORMAL
BILIRUB SERPL-MCNC: 0.4 MG/DL (ref 0.2–1)
BNP SERPL-MCNC: 599 PG/ML (ref 0–1800)
BUN SERPL-MCNC: 18 MG/DL (ref 7–18)
BUN/CREAT SERPL: 13 (ref 12–20)
CALCIUM SERPL-MCNC: 8.4 MG/DL (ref 8.5–10.1)
CHLORIDE SERPL-SCNC: 104 MMOL/L (ref 100–111)
CO2 SERPL-SCNC: 24 MMOL/L (ref 21–32)
COVID-19 RAPID TEST, COVR: NOT DETECTED
CREAT SERPL-MCNC: 1.4 MG/DL (ref 0.6–1.3)
DIFFERENTIAL METHOD BLD: ABNORMAL
EOSINOPHIL # BLD: 0.9 K/UL (ref 0–0.4)
EOSINOPHIL NFR BLD: 8 % (ref 0–5)
ERYTHROCYTE [DISTWIDTH] IN BLOOD BY AUTOMATED COUNT: 14.3 % (ref 11.6–14.5)
GAS FLOW.O2 O2 DELIVERY SYS: ABNORMAL L/MIN
GLOBULIN SER CALC-MCNC: 2.9 G/DL (ref 2–4)
GLUCOSE SERPL-MCNC: 105 MG/DL (ref 74–99)
HCO3 BLD-SCNC: 21.7 MMOL/L (ref 22–26)
HCT VFR BLD AUTO: 31.9 % (ref 36–48)
HGB BLD-MCNC: 10.3 G/DL (ref 13–16)
IMM GRANULOCYTES # BLD AUTO: 0.1 K/UL (ref 0–0.04)
IMM GRANULOCYTES NFR BLD AUTO: 1 % (ref 0–0.5)
INR PPP: 0.9 (ref 0.8–1.2)
LACTATE BLD-SCNC: 0.67 MMOL/L (ref 0.4–2)
LYMPHOCYTES # BLD: 0.8 K/UL (ref 0.9–3.6)
LYMPHOCYTES NFR BLD: 6 % (ref 21–52)
MCH RBC QN AUTO: 27.8 PG (ref 24–34)
MCHC RBC AUTO-ENTMCNC: 32.3 G/DL (ref 31–37)
MCV RBC AUTO: 86.2 FL (ref 78–100)
MONOCYTES # BLD: 0.9 K/UL (ref 0.05–1.2)
MONOCYTES NFR BLD: 8 % (ref 3–10)
NEUTS SEG # BLD: 9.4 K/UL (ref 1.8–8)
NEUTS SEG NFR BLD: 77 % (ref 40–73)
NRBC # BLD: 0 K/UL (ref 0–0.01)
NRBC BLD-RTO: 0 PER 100 WBC
PCO2 BLD: 34.4 MMHG (ref 35–45)
PH BLD: 7.41 [PH] (ref 7.35–7.45)
PLATELET # BLD AUTO: 302 K/UL (ref 135–420)
PMV BLD AUTO: 9.1 FL (ref 9.2–11.8)
PO2 BLD: 73 MMHG (ref 80–100)
POTASSIUM SERPL-SCNC: 4.3 MMOL/L (ref 3.5–5.5)
PROT SERPL-MCNC: 5.9 G/DL (ref 6.4–8.2)
PROTHROMBIN TIME: 12.8 SEC (ref 11.5–15.2)
RBC # BLD AUTO: 3.7 M/UL (ref 4.35–5.65)
SAO2 % BLD: 94.6 % (ref 92–97)
SERVICE CMNT-IMP: ABNORMAL
SODIUM SERPL-SCNC: 137 MMOL/L (ref 136–145)
SOURCE, COVRS: NORMAL
SPECIMEN TYPE: ABNORMAL
TROPONIN-HIGH SENSITIVITY: 7 NG/L (ref 0–78)
WBC # BLD AUTO: 12.2 K/UL (ref 4.6–13.2)

## 2022-07-20 PROCEDURE — 96375 TX/PRO/DX INJ NEW DRUG ADDON: CPT

## 2022-07-20 PROCEDURE — 87040 BLOOD CULTURE FOR BACTERIA: CPT

## 2022-07-20 PROCEDURE — 94640 AIRWAY INHALATION TREATMENT: CPT

## 2022-07-20 PROCEDURE — 71045 X-RAY EXAM CHEST 1 VIEW: CPT

## 2022-07-20 PROCEDURE — 74011250636 HC RX REV CODE- 250/636: Performed by: EMERGENCY MEDICINE

## 2022-07-20 PROCEDURE — 93005 ELECTROCARDIOGRAM TRACING: CPT

## 2022-07-20 PROCEDURE — 83880 ASSAY OF NATRIURETIC PEPTIDE: CPT

## 2022-07-20 PROCEDURE — 74011000250 HC RX REV CODE- 250: Performed by: EMERGENCY MEDICINE

## 2022-07-20 PROCEDURE — 83605 ASSAY OF LACTIC ACID: CPT

## 2022-07-20 PROCEDURE — 85025 COMPLETE CBC W/AUTO DIFF WBC: CPT

## 2022-07-20 PROCEDURE — 84484 ASSAY OF TROPONIN QUANT: CPT

## 2022-07-20 PROCEDURE — 85610 PROTHROMBIN TIME: CPT

## 2022-07-20 PROCEDURE — 80053 COMPREHEN METABOLIC PANEL: CPT

## 2022-07-20 PROCEDURE — 99285 EMERGENCY DEPT VISIT HI MDM: CPT

## 2022-07-20 PROCEDURE — 96365 THER/PROPH/DIAG IV INF INIT: CPT

## 2022-07-20 PROCEDURE — 87635 SARS-COV-2 COVID-19 AMP PRB: CPT

## 2022-07-20 RX ORDER — ALBUTEROL SULFATE 0.83 MG/ML
5 SOLUTION RESPIRATORY (INHALATION)
Status: COMPLETED | OUTPATIENT
Start: 2022-07-20 | End: 2022-07-20

## 2022-07-20 RX ORDER — ALBUTEROL SULFATE 90 UG/1
2 AEROSOL, METERED RESPIRATORY (INHALATION)
Qty: 2 EACH | Refills: 0 | Status: SHIPPED | OUTPATIENT
Start: 2022-07-20 | End: 2022-07-25

## 2022-07-20 RX ORDER — MAGNESIUM SULFATE HEPTAHYDRATE 40 MG/ML
2 INJECTION, SOLUTION INTRAVENOUS ONCE
Status: COMPLETED | OUTPATIENT
Start: 2022-07-20 | End: 2022-07-20

## 2022-07-20 RX ORDER — PREDNISONE 20 MG/1
60 TABLET ORAL DAILY
Qty: 15 TABLET | Refills: 0 | Status: SHIPPED | OUTPATIENT
Start: 2022-07-20 | End: 2022-07-25

## 2022-07-20 RX ORDER — AZITHROMYCIN 250 MG/1
TABLET, FILM COATED ORAL
Qty: 6 TABLET | Refills: 0 | Status: SHIPPED | OUTPATIENT
Start: 2022-07-20 | End: 2022-07-25

## 2022-07-20 RX ADMIN — AZITHROMYCIN MONOHYDRATE 500 MG: 500 INJECTION, POWDER, LYOPHILIZED, FOR SOLUTION INTRAVENOUS at 13:57

## 2022-07-20 RX ADMIN — MAGNESIUM SULFATE HEPTAHYDRATE 2 G: 40 INJECTION, SOLUTION INTRAVENOUS at 12:55

## 2022-07-20 RX ADMIN — METHYLPREDNISOLONE SODIUM SUCCINATE 125 MG: 125 INJECTION, POWDER, FOR SOLUTION INTRAMUSCULAR; INTRAVENOUS at 12:55

## 2022-07-20 RX ADMIN — ALBUTEROL SULFATE 5 MG: 2.5 SOLUTION RESPIRATORY (INHALATION) at 13:57

## 2022-07-20 NOTE — ED PROVIDER NOTES
58-CRWZ-NBH male with complicated past medical history prostate cancer, CHF, CKD, COPD tension he has been seen multiple times in this emergency room with several admissions previously for COPD presents via ambulance for shortness of breath. She arrives speaking in full sentences. EMS administered albuterol prior to arrival.  He was afebrile blood pressure 165/77 respiratory rate was 20 oxygen saturation is 97% on room air.        Past Medical History:   Diagnosis Date    BMI 30.0-30.9,adult 4/2/2022    Brain aneurysm     Brain aneurysm     Cancer [facility])     prostate    CHF (congestive heart failure) (HCC)     CKD (chronic kidney disease)     Closed nondisplaced supracondylar fracture of lower end of left femur without intracondylar extension with delayed healing     COPD (chronic obstructive pulmonary disease) ([facility]     Debility     History of home oxygen therapy     [place] (hard of hearing)     Hypertension     Nocturia     On home oxygen therapy     PAF (paroxysmal atrial fibrillation) (HCC)     Pneumonia     Prostate CA ([facility]     Prostate neoplasm     Radiation effect        Past Surgical History:   Procedure Laterality Date    HX HERNIA REPAIR      HX HIP ARTHROSCOPY  04/09/2021         Family History:   Problem Relation Age of Onset    Heart Disease Mother        Social History     Socioeconomic History    Marital status:      Spouse name: Not on file    Number of children: Not on file    Years of education: Not on file    Highest education level: Not on file   Occupational History    Not on file   Tobacco Use    Smoking status: Former     Types: Cigarettes    Smokeless tobacco: Never   Substance and Sexual Activity    Alcohol use: No    Drug use: Never    Sexual activity: Not on file   Other Topics Concern    Not on file   Social History Narrative    Not on file     Social Determinants of Health     Financial Resource Strain: Not on file   Food Insecurity: Not on file   Transportation Needs: Not on file Physical Activity: Not on file   Stress: Not on file   Social Connections: Not on file   Intimate Partner Violence: Not on file   Housing Stability: Not on file         ALLERGIES: Aspirin, Bactrim [sulfamethoxazole-trimethoprim], and Nsaids (non-steroidal anti-inflammatory drug)    Review of Systems   Constitutional:  Positive for fatigue. Negative for activity change, appetite change, chills, diaphoresis, fever and unexpected weight change. HENT:  Positive for congestion. Eyes: Negative. Respiratory:  Positive for cough, shortness of breath and wheezing. Cardiovascular: Negative. Gastrointestinal: Negative. Genitourinary: Negative. Musculoskeletal: Negative. Neurological: Negative. Hematological: Negative. Psychiatric/Behavioral: Negative. Vitals:    07/20/22 1230   BP: (!) 165/77   Pulse: 75   Resp: 20   Temp: 99.1 °F (37.3 °C)   SpO2: 97%   Weight: 90.7 kg (200 lb)   Height: 5' 8\" (1.727 m)            Physical Exam  Vitals and nursing note reviewed. Constitutional:       General: He is not in acute distress. Appearance: He is ill-appearing. He is not toxic-appearing. HENT:      Head: Normocephalic and atraumatic. Neck:      Thyroid: No thyromegaly. Vascular: No hepatojugular reflux. Trachea: No tracheal deviation. Cardiovascular:      Rate and Rhythm: Normal rate and regular rhythm. Heart sounds: Murmur heard. Pulmonary:      Effort: Tachypnea and respiratory distress present. Breath sounds: Examination of the right-middle field reveals wheezing. Examination of the left-middle field reveals wheezing. Examination of the right-lower field reveals wheezing. Examination of the left-lower field reveals wheezing. Wheezing present. No decreased breath sounds, rhonchi or rales. Chest:      Chest wall: No mass, deformity, tenderness, crepitus or edema. There is no dullness to percussion.    Abdominal:      General: Bowel sounds are normal. Palpations: Abdomen is soft. There is no hepatomegaly or mass. Musculoskeletal:         General: Normal range of motion. Cervical back: Normal range of motion. Right lower leg: No edema. Skin:     General: Skin is warm and dry. Capillary Refill: Capillary refill takes less than 2 seconds. Coloration: Skin is not cyanotic. Findings: No ecchymosis. Neurological:      General: No focal deficit present. Mental Status: He is alert. Psychiatric:         Mood and Affect: Mood normal. Mood is not anxious. Behavior: Behavior normal. Behavior is not agitated. MDM  Number of Diagnoses or Management Options  Chronic obstructive pulmonary disease with acute exacerbation [facility])  Diagnosis management comments: 64-year-old male with history of severe COPD presents emergency department via EMS for respiratory distress. Route patient had 1 albuterol treatment, he arrives speaking in 2-3 word sentences initially believed to need BiPAP however we were able to turn them around with rapid treatment. On arrival to the emergency department patient had wheezing and multiple lung fields. He is supposed to be on home oxygen but is minimally compliant. IV was started labs were sent including blood cultures and lactate COVID-19 testing was done patient was given Solu-Medrol as well as 2 g of magnesium. 19 testing returned negative patient was given 5 mg inhaled albuterol treatment. Chest x-ray showed baseline for patient COPD chronic changes. No pneumonia or pneumothorax noted. ABG was done again found to be patient baseline with COPD. Lactate was not elevated. White count was not elevated. Patient was reexamined multiple times over the course of his emergency department improved drastically from his presentation. To the point that he was yelling for staff to help him and get him food and speaking in complete loud and aggressive sentences.   Shortness of breath resolved, patient will be placed on steroids for 5 days azithromycin to cover COPD exacerbation possible bronchitis as well as refills on his albuterol inhaler.   He is encouraged to continue to use oxygen at home discharged home           Critical Care    Date/Time: 7/21/2022 8:06 AM  Performed by: Autumn Mortimer, MD  Authorized by: Autumn Mortimer, MD     Critical care provider statement:     Critical care time (minutes):  30    Critical care time was exclusive of:  Separately billable procedures and treating other patients and teaching time    Critical care was necessary to treat or prevent imminent or life-threatening deterioration of the following conditions:  Respiratory failure    Critical care was time spent personally by me on the following activities:  Development of treatment plan with patient or surrogate, discussions with consultants, evaluation of patient's response to treatment, examination of patient, interpretation of cardiac output measurements, obtaining history from patient or surrogate, ordering and performing treatments and interventions, ordering and review of laboratory studies, ordering and review of radiographic studies, pulse oximetry, re-evaluation of patient's condition and review of old charts

## 2022-07-20 NOTE — ED TRIAGE NOTES
Pt brought in by EMS for shortness of breath. Pt was given an A&A treatment. Pt is suppose to be on oxygen but doesn't use it, has recently been diagnosed with dementia.  RA saturations at this time 98%

## 2022-07-21 ENCOUNTER — APPOINTMENT (OUTPATIENT)
Dept: GENERAL RADIOLOGY | Age: 77
End: 2022-07-21
Attending: EMERGENCY MEDICINE
Payer: COMMERCIAL

## 2022-07-21 ENCOUNTER — HOSPITAL ENCOUNTER (EMERGENCY)
Age: 77
Discharge: HOME | End: 2022-07-21
Attending: EMERGENCY MEDICINE
Payer: COMMERCIAL

## 2022-07-21 VITALS
WEIGHT: 200 LBS | HEIGHT: 68 IN | SYSTOLIC BLOOD PRESSURE: 164 MMHG | TEMPERATURE: 98.3 F | DIASTOLIC BLOOD PRESSURE: 69 MMHG | RESPIRATION RATE: 15 BRPM | BODY MASS INDEX: 30.31 KG/M2 | HEART RATE: 78 BPM | OXYGEN SATURATION: 100 %

## 2022-07-21 DIAGNOSIS — J44.1 CHRONIC OBSTRUCTIVE PULMONARY DISEASE WITH ACUTE EXACERBATION (HCC): Primary | ICD-10-CM

## 2022-07-21 LAB
ALBUMIN SERPL-MCNC: 3.1 G/DL (ref 3.4–5)
ALBUMIN/GLOB SERPL: 1 {RATIO} (ref 0.8–1.7)
ALP SERPL-CCNC: 106 U/L (ref 45–117)
ALT SERPL-CCNC: 12 U/L (ref 16–61)
ANION GAP BLD CALC-SCNC: 14 MMOL/L (ref 10–20)
ANION GAP SERPL CALC-SCNC: 9 MMOL/L (ref 3–18)
APTT PPP: 27.7 SEC (ref 23–36.4)
ARTERIAL PATENCY WRIST A: POSITIVE
AST SERPL-CCNC: 11 U/L (ref 10–38)
BASE DEFICIT BLD-SCNC: 2.5 MMOL/L
BASOPHILS # BLD: 0 K/UL (ref 0–0.1)
BASOPHILS NFR BLD: 0 % (ref 0–2)
BDY SITE: ABNORMAL
BILIRUB SERPL-MCNC: 0.3 MG/DL (ref 0.2–1)
BNP SERPL-MCNC: 1351 PG/ML (ref 0–1800)
BODY TEMPERATURE: 98.6
BUN SERPL-MCNC: 25 MG/DL (ref 7–18)
BUN/CREAT SERPL: 18 (ref 12–20)
CA-I BLD-MCNC: 1.08 MMOL/L (ref 1.12–1.32)
CALCIUM SERPL-MCNC: 8.6 MG/DL (ref 8.5–10.1)
CHLORIDE BLD-SCNC: 101 MMOL/L (ref 98–107)
CHLORIDE SERPL-SCNC: 105 MMOL/L (ref 100–111)
CO2 BLD-SCNC: 22.4 MMOL/L (ref 21–32)
CO2 SERPL-SCNC: 23 MMOL/L (ref 21–32)
CREAT BLD-MCNC: 1.39 MG/DL (ref 0.6–1.3)
CREAT SERPL-MCNC: 1.4 MG/DL (ref 0.6–1.3)
DIFFERENTIAL METHOD BLD: ABNORMAL
EOSINOPHIL # BLD: 0 K/UL (ref 0–0.4)
EOSINOPHIL NFR BLD: 0 % (ref 0–5)
ERYTHROCYTE [DISTWIDTH] IN BLOOD BY AUTOMATED COUNT: 14.2 % (ref 11.6–14.5)
GAS FLOW.O2 O2 DELIVERY SYS: ABNORMAL L/MIN
GLOBULIN SER CALC-MCNC: 3.1 G/DL (ref 2–4)
GLUCOSE BLD-MCNC: 139 MG/DL (ref 65–100)
GLUCOSE SERPL-MCNC: 135 MG/DL (ref 74–99)
HCO3 BLD-SCNC: 21.5 MMOL/L (ref 22–26)
HCT VFR BLD AUTO: 31.3 % (ref 36–48)
HGB BLD-MCNC: 10.1 G/DL (ref 13–16)
IMM GRANULOCYTES # BLD AUTO: 0.1 K/UL (ref 0–0.04)
IMM GRANULOCYTES NFR BLD AUTO: 1 % (ref 0–0.5)
INR PPP: 0.9 (ref 0.8–1.2)
LACTATE BLD-SCNC: 1.28 MMOL/L (ref 0.4–2)
LYMPHOCYTES # BLD: 0.5 K/UL (ref 0.9–3.6)
LYMPHOCYTES NFR BLD: 4 % (ref 21–52)
MAGNESIUM SERPL-MCNC: 2.4 MG/DL (ref 1.6–2.6)
MCH RBC QN AUTO: 27.4 PG (ref 24–34)
MCHC RBC AUTO-ENTMCNC: 32.3 G/DL (ref 31–37)
MCV RBC AUTO: 84.8 FL (ref 78–100)
MONOCYTES # BLD: 0.4 K/UL (ref 0.05–1.2)
MONOCYTES NFR BLD: 3 % (ref 3–10)
NEUTS SEG # BLD: 11.5 K/UL (ref 1.8–8)
NEUTS SEG NFR BLD: 92 % (ref 40–73)
NRBC # BLD: 0 K/UL (ref 0–0.01)
NRBC BLD-RTO: 0 PER 100 WBC
PCO2 BLD: 33.5 MMHG (ref 35–45)
PH BLD: 7.42 [PH] (ref 7.35–7.45)
PLATELET # BLD AUTO: 319 K/UL (ref 135–420)
PMV BLD AUTO: 9.2 FL (ref 9.2–11.8)
PO2 BLD: 81 MMHG (ref 80–100)
POTASSIUM BLD-SCNC: 4.5 MMOL/L (ref 3.5–5.1)
POTASSIUM SERPL-SCNC: 4.7 MMOL/L (ref 3.5–5.5)
PROT SERPL-MCNC: 6.2 G/DL (ref 6.4–8.2)
PROTHROMBIN TIME: 12.8 SEC (ref 11.5–15.2)
RBC # BLD AUTO: 3.69 M/UL (ref 4.35–5.65)
SAO2 % BLD: 96.2 % (ref 92–97)
SERVICE CMNT-IMP: ABNORMAL
SERVICE CMNT-IMP: ABNORMAL
SODIUM BLD-SCNC: 136 MMOL/L (ref 136–145)
SODIUM SERPL-SCNC: 137 MMOL/L (ref 136–145)
SPECIMEN SITE: ABNORMAL
SPECIMEN TYPE: ABNORMAL
TROPONIN-HIGH SENSITIVITY: 7 NG/L (ref 0–78)
TROPONIN-HIGH SENSITIVITY: 8 NG/L (ref 0–78)
WBC # BLD AUTO: 12.5 K/UL (ref 4.6–13.2)

## 2022-07-21 PROCEDURE — 74011250636 HC RX REV CODE- 250/636: Performed by: EMERGENCY MEDICINE

## 2022-07-21 PROCEDURE — 83880 ASSAY OF NATRIURETIC PEPTIDE: CPT

## 2022-07-21 PROCEDURE — 96374 THER/PROPH/DIAG INJ IV PUSH: CPT

## 2022-07-21 PROCEDURE — 80047 BASIC METABLC PNL IONIZED CA: CPT

## 2022-07-21 PROCEDURE — 93005 ELECTROCARDIOGRAM TRACING: CPT

## 2022-07-21 PROCEDURE — 94762 N-INVAS EAR/PLS OXIMTRY CONT: CPT

## 2022-07-21 PROCEDURE — 82803 BLOOD GASES ANY COMBINATION: CPT

## 2022-07-21 PROCEDURE — 99285 EMERGENCY DEPT VISIT HI MDM: CPT

## 2022-07-21 PROCEDURE — 36600 WITHDRAWAL OF ARTERIAL BLOOD: CPT

## 2022-07-21 PROCEDURE — 74011000250 HC RX REV CODE- 250: Performed by: EMERGENCY MEDICINE

## 2022-07-21 PROCEDURE — 80053 COMPREHEN METABOLIC PANEL: CPT

## 2022-07-21 PROCEDURE — 94640 AIRWAY INHALATION TREATMENT: CPT

## 2022-07-21 PROCEDURE — 87040 BLOOD CULTURE FOR BACTERIA: CPT

## 2022-07-21 PROCEDURE — 84484 ASSAY OF TROPONIN QUANT: CPT

## 2022-07-21 PROCEDURE — 71045 X-RAY EXAM CHEST 1 VIEW: CPT

## 2022-07-21 PROCEDURE — 85025 COMPLETE CBC W/AUTO DIFF WBC: CPT

## 2022-07-21 PROCEDURE — 85730 THROMBOPLASTIN TIME PARTIAL: CPT

## 2022-07-21 PROCEDURE — 83735 ASSAY OF MAGNESIUM: CPT

## 2022-07-21 PROCEDURE — 85610 PROTHROMBIN TIME: CPT

## 2022-07-21 RX ORDER — IPRATROPIUM BROMIDE AND ALBUTEROL SULFATE 2.5; .5 MG/3ML; MG/3ML
3 SOLUTION RESPIRATORY (INHALATION)
Status: COMPLETED | OUTPATIENT
Start: 2022-07-21 | End: 2022-07-21

## 2022-07-21 RX ADMIN — METHYLPREDNISOLONE SODIUM SUCCINATE 125 MG: 125 INJECTION, POWDER, FOR SOLUTION INTRAMUSCULAR; INTRAVENOUS at 05:05

## 2022-07-21 RX ADMIN — IPRATROPIUM BROMIDE AND ALBUTEROL SULFATE 3 ML: .5; 3 SOLUTION RESPIRATORY (INHALATION) at 05:05

## 2022-07-21 NOTE — ED PROVIDER NOTES
EMERGENCY DEPARTMENT HISTORY AND PHYSICAL EXAM    Date: 7/21/2022  Patient Name: Abhinav Tobar    History of Presenting Illness     Chief Complaint   Patient presents with    Shortness of Breath       History Provided By: Patient and EMS     History Kaylyn Marking):   2:39 AM  Abhinav Tobar is a 64 y.o. male with a PMHX of CHF, CKD, COPD, brain aneurysm  who presents to the emergency department (room 5) by EMS C/O shortness of breath onset today. Associated sxs include noncompliance with home oxygen therapy. Pt denies fever chills, nausea, vomiting, chest pain or any other sxs or complaints. Patient is brought in by EMS for shortness of breath. He was seen yesterday for COPD exacerbation. He was placed on steroids and azithromycin at home. He is supposed be on oxygen at home but was not taking when EMS arrived. Chief Complaint: Shortness of breath  Onset: Today  Timing:  Acute on Subacute  Context: Symptoms started spontaneously, symptoms have progressively worsened since onset  Location: Lungs  Quality: Tightness  Severity: Moderate  Modifying Factors: Nothing makes it better, or worse.   Associated Symptoms: denies any other associated signs or symptoms    PCP: Maria Guadalupe Sanchez MD     Past History         Past Medical History:  Past Medical History:   Diagnosis Date    BMI 30.0-30.9,adult 4/2/2022    Brain aneurysm     Brain aneurysm     Cancer [facility])     prostate    CHF (congestive heart failure) (HCC)     CKD (chronic kidney disease)     Closed nondisplaced supracondylar fracture of lower end of left femur without intracondylar extension with delayed healing     COPD (chronic obstructive pulmonary disease) ([facility] [address]     Debility     History of home oxygen therapy     [place] (hard of hearing)     Hypertension     Nocturia     On home oxygen therapy     PAF (paroxysmal atrial fibrillation) (HCC)     Pneumonia     Prostate CA ([address]     Prostate neoplasm     Radiation effect        Past Surgical History:  Past Surgical History:   Procedure Laterality Date    HX HERNIA REPAIR      HX HIP ARTHROSCOPY  04/09/2021       Family History:  Family History   Problem Relation Age of Onset    Heart Disease Mother    Reviewed and non-contributory    Social History:  Social History     Tobacco Use    Smoking status: Former     Types: Cigarettes    Smokeless tobacco: Never   Substance Use Topics    Alcohol use: No    Drug use: Never       Medications:  Current Outpatient Medications   Medication Sig Dispense Refill    azithromycin (Zithromax Z-Aidan) 250 mg tablet Take 2 Tablets by mouth daily for 1 day, THEN 1 Tablet daily for 4 days. 6 Tablet 0    predniSONE (DELTASONE) 20 mg tablet Take 3 Tablets by mouth in the morning for 5 days. With Breakfast 15 Tablet 0    albuterol (PROVENTIL HFA, VENTOLIN HFA, PROAIR HFA) 90 mcg/actuation inhaler Take 2 Puffs by inhalation every four (4) hours as needed for Wheezing or Shortness of Breath for up to 5 days. 2 Each 0    predniSONE (STERAPRED DS) 10 mg dose pack 6 day dose pack per package instructions 1 Dose Pack 0    albuterol (PROVENTIL HFA, VENTOLIN HFA, PROAIR HFA) 90 mcg/actuation inhaler Take 2 Puffs by inhalation every four (4) hours as needed for Wheezing or Shortness of Breath. 1 Each 3    DAPTOMYCIN  mg by IntraVENous route daily. in 25mL NS      sodium chloride (Normal Saline Flush) 5-10 mL by IntraVENous route daily. flush IV catheter with 10ml before and after infusing each dose of medication as directed. heparin sodium,porcine (HEPARIN LOCK FLUSH IV) 3 mL by IntraVENous route daily. flush IV catheter with 3ml of heparin 10units/mL after the last dose of 0.9% sodium chloride flush, after each dose of medication or as directed. flush IV catheter every day if not in use. ferrous sulfate 325 mg (65 mg iron) tablet Take 1 Tablet by mouth two (2) times daily (with meals). 60 Tablet 3    apixaban (ELIQUIS) 5 mg tablet Take 1 Tablet by mouth two (2) times a day.  60 Tablet 2 tamsulosin (FLOMAX) 0.4 mg capsule Take 1 Capsule by mouth every evening. 30 Capsule 3    furosemide (Lasix) 20 mg tablet Take 1 Tablet by mouth daily. 30 Tablet 3    amLODIPine (NORVASC) 10 mg tablet Take 1 Tablet by mouth daily. 30 Tablet 3    albuterol-ipratropium (DUO-NEB) 2.5 mg-0.5 mg/3 ml nebu 3 mL by Nebulization route every four (4) hours as needed for Wheezing. 30 Nebule 3    pantoprazole (PROTONIX) 40 mg tablet Take 1 Tablet by mouth Before breakfast and dinner. 60 Tablet 0    OXYGEN-AIR DELIVERY SYSTEMS 2 L/min by Nasal route daily as needed for PRN Reason (Other) (SOB/wheezing). dextran 70/hypromellose (ARTIFICIAL TEARS, PF, OP) Administer 2 Drops to both eyes daily as needed for Other (dry eyes). Allergies: Allergies   Allergen Reactions    Aspirin Other (comments)     \"messes my stomach up\"    Bactrim [Sulfamethoxazole-Trimethoprim] Unknown (comments)    Nsaids (Non-Steroidal Anti-Inflammatory Drug) Unknown (comments)       Review of Systems      Review of Systems   Constitutional:  Negative for chills and fever. HENT:  Negative for rhinorrhea and sore throat. Eyes:  Negative for pain and visual disturbance. Respiratory:  Positive for shortness of breath. Negative for chest tightness and wheezing. Cardiovascular:  Negative for chest pain and palpitations. Gastrointestinal:  Negative for abdominal pain, diarrhea, nausea and vomiting. Musculoskeletal:  Negative for arthralgias and myalgias. Skin:  Negative for rash and wound. Neurological:  Negative for speech difficulty, light-headedness and headaches. Psychiatric/Behavioral:  Negative for agitation and confusion. All other systems reviewed and are negative.     Physical Exam     Vitals:    07/21/22 0708 07/21/22 0710 07/21/22 0725 07/21/22 0740   BP: (!) 161/67  (!) 164/69    Pulse: 76 73 78 78   Resp: 20 21 17 15   Temp:       SpO2: 99% 100% 100%    Weight:       Height:         Physical Exam  Vitals and nursing note reviewed. Constitutional:       General: He is not in acute distress. Appearance: Normal appearance. He is normal weight. He is not ill-appearing. HENT:      Head: Normocephalic and atraumatic. Nose: Nose normal. No rhinorrhea. Mouth/Throat:      Mouth: Mucous membranes are moist.      Pharynx: No oropharyngeal exudate or posterior oropharyngeal erythema. Eyes:      Extraocular Movements: Extraocular movements intact. Conjunctiva/sclera: Conjunctivae normal.      Pupils: Pupils are equal, round, and reactive to light. Cardiovascular:      Rate and Rhythm: Normal rate and regular rhythm. Heart sounds: No murmur heard. No friction rub. No gallop. Pulmonary:      Effort: Pulmonary effort is normal. No respiratory distress. Breath sounds: Examination of the right-middle field reveals wheezing. Examination of the left-middle field reveals wheezing. Examination of the right-lower field reveals wheezing. Examination of the left-lower field reveals wheezing. Wheezing present. No rhonchi or rales. Abdominal:      General: Bowel sounds are normal.      Palpations: Abdomen is soft. Tenderness: There is no abdominal tenderness. There is no guarding or rebound. Musculoskeletal:         General: No swelling, tenderness or deformity. Normal range of motion. Cervical back: Normal range of motion and neck supple. No rigidity. Lymphadenopathy:      Cervical: No cervical adenopathy. Skin:     General: Skin is warm and dry. Findings: No rash. Neurological:      General: No focal deficit present. Mental Status: He is alert and oriented to person, place, and time.    Psychiatric:         Mood and Affect: Mood normal.         Behavior: Behavior normal.       Diagnostic Study Results     Labs -  Recent Results (from the past 12 hour(s))   CHEM8,LACTIC ACID,POC    Collection Time: 07/21/22  2:40 AM   Result Value Ref Range    Calcium, ionized (POC) 1.08 (L) 1.12 - 1.32 mmol/L    Sodium (POC) 136 136 - 145 mmol/L    Potassium (POC) 4.5 3.5 - 5.1 mmol/L    Chloride (POC) 101 98 - 107 mmol/L    CO2 (POC) 22.4 21 - 32 mmol/L    Glucose (POC) 139 (H) 65 - 100 mg/dL    Creatinine (POC) 1.39 (H) 0.6 - 1.3 mg/dL    GFRAA, POC 60 (L) >60 ml/min/1.73m2    GFRNA, POC 49 (L) >60 ml/min/1.73m2    Lactic Acid (POC) 1.28 0.40 - 2.00 mmol/L    Sample source VENOUS BLOOD      Performed by Caroline Eubanks     Anion gap (POC) 14 10 - 20 mmol/L   EKG, 12 LEAD, INITIAL    Collection Time: 07/21/22  2:43 AM   Result Value Ref Range    Ventricular Rate 81 BPM    Atrial Rate 81 BPM    P-R Interval 166 ms    QRS Duration 80 ms    Q-T Interval 398 ms    QTC Calculation (Bezet) 462 ms    Calculated P Axis 62 degrees    Calculated R Axis -16 degrees    Calculated T Axis 55 degrees    Diagnosis       Normal sinus rhythm  Normal ECG  When compared with ECG of 20-JUL-2022 12:27,  No significant change was found     BLOOD GAS, ARTERIAL POC    Collection Time: 07/21/22  2:46 AM   Result Value Ref Range    Device: ROOM AIR      pH (POC) 7.42 7.35 - 7.45      pCO2 (POC) 33.5 (L) 35.0 - 45.0 MMHG    pO2 (POC) 81 80 - 100 MMHG    HCO3 (POC) 21.5 (L) 22 - 26 MMOL/L    sO2 (POC) 96.2 92 - 97 %    Base deficit (POC) 2.5 mmol/L    Allens test (POC) Positive      Site RIGHT RADIAL      Patient temp.  98.6      Specimen type (POC) ARTERIAL      Performed by Citlaly Alaniz    CBC WITH AUTOMATED DIFF    Collection Time: 07/21/22  2:48 AM   Result Value Ref Range    WBC 12.5 4.6 - 13.2 K/uL    RBC 3.69 (L) 4.35 - 5.65 M/uL    HGB 10.1 (L) 13.0 - 16.0 g/dL    HCT 31.3 (L) 36.0 - 48.0 %    MCV 84.8 78.0 - 100.0 FL    MCH 27.4 24.0 - 34.0 PG    MCHC 32.3 31.0 - 37.0 g/dL    RDW 14.2 11.6 - 14.5 %    PLATELET 219 270 - 533 K/uL    MPV 9.2 9.2 - 11.8 FL    NRBC 0.0 0  WBC    ABSOLUTE NRBC 0.00 0.00 - 0.01 K/uL    NEUTROPHILS 92 (H) 40 - 73 %    LYMPHOCYTES 4 (L) 21 - 52 %    MONOCYTES 3 3 - 10 %    EOSINOPHILS 0 0 - 5 % BASOPHILS 0 0 - 2 %    IMMATURE GRANULOCYTES 1 (H) 0.0 - 0.5 %    ABS. NEUTROPHILS 11.5 (H) 1.8 - 8.0 K/UL    ABS. LYMPHOCYTES 0.5 (L) 0.9 - 3.6 K/UL    ABS. MONOCYTES 0.4 0.05 - 1.2 K/UL    ABS. EOSINOPHILS 0.0 0.0 - 0.4 K/UL    ABS. BASOPHILS 0.0 0.0 - 0.1 K/UL    ABS. IMM. GRANS. 0.1 (H) 0.00 - 0.04 K/UL    DF AUTOMATED     METABOLIC PANEL, COMPREHENSIVE    Collection Time: 07/21/22  2:48 AM   Result Value Ref Range    Sodium 137 136 - 145 mmol/L    Potassium 4.7 3.5 - 5.5 mmol/L    Chloride 105 100 - 111 mmol/L    CO2 23 21 - 32 mmol/L    Anion gap 9 3.0 - 18 mmol/L    Glucose 135 (H) 74 - 99 mg/dL    BUN 25 (H) 7.0 - 18 MG/DL    Creatinine 1.40 (H) 0.6 - 1.3 MG/DL    BUN/Creatinine ratio 18 12 - 20      GFR est AA 59 (L) >60 ml/min/1.73m2    GFR est non-AA 49 (L) >60 ml/min/1.73m2    Calcium 8.6 8.5 - 10.1 MG/DL    Bilirubin, total 0.3 0.2 - 1.0 MG/DL    ALT (SGPT) 12 (L) 16 - 61 U/L    AST (SGOT) 11 10 - 38 U/L    Alk.  phosphatase 106 45 - 117 U/L    Protein, total 6.2 (L) 6.4 - 8.2 g/dL    Albumin 3.1 (L) 3.4 - 5.0 g/dL    Globulin 3.1 2.0 - 4.0 g/dL    A-G Ratio 1.0 0.8 - 1.7     NT-PRO BNP    Collection Time: 07/21/22  2:48 AM   Result Value Ref Range    NT pro-BNP 1,351 0 - 1,800 PG/ML   TROPONIN-HIGH SENSITIVITY    Collection Time: 07/21/22  2:48 AM   Result Value Ref Range    Troponin-High Sensitivity 7 0 - 78 ng/L   PTT    Collection Time: 07/21/22  2:48 AM   Result Value Ref Range    aPTT 27.7 23.0 - 36.4 SEC   PROTHROMBIN TIME + INR    Collection Time: 07/21/22  2:48 AM   Result Value Ref Range    Prothrombin time 12.8 11.5 - 15.2 sec    INR 0.9 0.8 - 1.2     MAGNESIUM    Collection Time: 07/21/22  2:48 AM   Result Value Ref Range    Magnesium 2.4 1.6 - 2.6 mg/dL   EKG, 12 LEAD, INITIAL    Collection Time: 07/21/22  5:08 AM   Result Value Ref Range    Ventricular Rate 74 BPM    Atrial Rate 74 BPM    P-R Interval 168 ms    QRS Duration 80 ms    Q-T Interval 402 ms    QTC Calculation (Bezet) 446 ms Calculated P Axis 68 degrees    Calculated R Axis -17 degrees    Calculated T Axis 56 degrees    Diagnosis       Normal sinus rhythm with sinus arrhythmia  Normal ECG  When compared with ECG of 21-JUL-2022 02:43,  No significant change was found     TROPONIN-HIGH SENSITIVITY    Collection Time: 07/21/22  5:08 AM   Result Value Ref Range    Troponin-High Sensitivity 8 0 - 78 ng/L       Radiologic Studies -   XR CHEST PORT   Final Result   No significant change in this one day interval without acute   cardiopulmonary disease. *  **              CT Results  (Last 48 hours)      None          CXR Results  (Last 48 hours)                 07/21/22 0307  XR CHEST PORT Final result    Impression:  No significant change in this one day interval without acute   cardiopulmonary disease. *  **               Narrative:  EXAM:  PORTABLE CHEST       INDICATION:  Shortness of breath. TECHNIQUE:  Portable, AP view. COMPARISON:  07/20/2022. CTA chest 05/13/2022.       ____________________       FINDINGS:         SUPPORT DEVICES: None. HEART AND MEDIASTINUM: Cardiomediastinal silhouette appears within normal   limits. Mild tortuous thoracic aorta. LUNGS AND PLEURAL SPACES: Lungs are adequately expanded. No change in   pleural-based calcifications on the right. No developing consolidation,   pulmonary edema, pneumothorax or significant pleural effusion. BONY THORAX AND SOFT TISSUES: No acute osseous abnormality. ____________________           07/20/22 1317  XR CHEST PORT Final result    Impression:      Essentially stable plain film appearance, without definitive acute change. Please note that subtle acute disease may be masked by chronic findings. Narrative:  HISTORY:    -Provided with order: COPD   -Additional: Shortness of breath.        Technique : AP PORTABLE CHEST       Comparison : 05/24/22        FINDINGS:       HEART AND MEDIASTINUM: Stable in visualized extent LUNGS AND PLEURAL SPACES: There is elevation of the right diaphragm. Persistent   pleural-based calcifications/opacities more evident on the right. Interstitial   accentuation. No clearly acute consolidation, congestive heart failure, pleural   effusion or pneumothorax. BONY THORAX AND SOFT TISSUES: No acute osseous abnormality. Medications given in the ED-  Medications   methylPREDNISolone (PF) (Solu-MEDROL) injection 125 mg (125 mg IntraVENous Given 22 0505)   albuterol-ipratropium (DUO-NEB) 2.5 MG-0.5 MG/3 ML (3 mL Nebulization Given 22 0505)       Procedures     Procedures    ED Course     I Wally Garcia MD) am the first provider for this patient. I reviewed the vital signs, available nursing notes, past medical history, past surgical history, family history and social history. Records Reviewed: Nursing Notes    Cardiac Monitor:  Rate: 81 bpm  Rhythm: sinus rhythm    Pulse Oximetry Analysis - 100% on RA    EKG interpretation: (Preliminary)  Rhythm: NSR. Rate: 81 bpm; no STEMI  EKG read by Wally Garcia MD at 2:43 AM    EKG interpretation: (Repeat)  Rhythm: NSR. Rate: 74 bpm; no STEMI  EKG read by Wally Garcia MD at 5:08 AM       2:39 AM Initial assessment performed. The patients presenting problems have been discussed, and they are in agreement with the care plan formulated and outlined with them. I have encouraged them to ask questions as they arise throughout their visit. ED Course as of 22 1029   Thu 2022   0720 Patient is calling out of the room verbally, clearly not short of breath anymore. Encouraged patient to use his oxygen at home as instructed and to fill his medications as instructed.  [JM]      ED Course User Index  [JM] Afua Small MD     HEART Score:    History:  0: Slightly suspicious  EK: Normal  Age:  2: Over 72  Risk Factors:  1: 1-2  Risk Factors:  Hypertension  Troponin 1: 7 ng/L  Troponin 2: 8 ng/L    Total: 3  0-3: Low risk: less than 2% risk of Major Adverse Cardiac Event at 6 weeks. Troponin increase by 1.4x or more: No      Medical Decision Making     Provider Notes (Medical Decision Making):   DDX: COPD, URI, pneumonia, unlikely ACS    Discussion:  64 y.o. male with COPD exacerbation today recurrent from yesterday. Patient has not filled his home meds. Patient cleared with a single nebulizer in the ED. Additionally, patient was not on oxygen in the home when EMS arrived. Suspect that there is a component of dementia which is not yet diagnosed because of the patient to forget that he is normally short of breath. Discussed with patient that he will need to follow-up with his primary care doctor and encouraged him to  his medicines. Patient understands and agrees with this plan. Diagnosis and Disposition     DISCHARGE NOTE:  7:23 AM   Marius Friday Vivian's  results have been reviewed with him. He has been counseled regarding his diagnosis, treatment, and plan. He verbally conveys understanding and agreement of the signs, symptoms, diagnosis, treatment and prognosis and additionally agrees to follow up as discussed. He also agrees with the care-plan and conveys that all of his questions have been answered. I have also provided discharge instructions for him that include: educational information regarding their diagnosis and treatment, and list of reasons why they would want to return to the ED prior to their follow-up appointment, should his condition change. He has been provided with education for proper emergency department utilization. CLINICAL IMPRESSION:    1. Chronic obstructive pulmonary disease with acute exacerbation (HCC)        PLAN:  1. D/C Home  2. Discharge Medication List as of 7/21/2022  7:24 AM        3.    Follow-up Information       Follow up With Specialties Details Why Contact Lea Lozada MD Family Medicine  As soon as possible, For follow up from Emergency Department visit. [address] [facility] [address] [facility]      [facility] EMERGENCY DE[facility] Emergency Medicine  As needed; If symptoms worsen [address] [place] [id number]  [address] [facility],[address] Floor, [facility] the primary clinician of record. Glenn Disclaimer     Please note that this dictation was completed with [employer], the computer voice recognition software. Quite often unanticipated grammatical, syntax, homophones, and other interpretive errors are inadvertently transcribed by the computer software. Please disregard these errors. Please excuse any errors that have escaped final proofreading.     Iva Avendano MD

## 2022-07-21 NOTE — ED NOTES
April 20, 2022     Patient: Alfonso Ty   YOB: 1970       To Whom it May Concern:    Alfonso Ty has received treatment at this office on the following dates: April 20h, 2022.. He  may resume restricted work on April 27th, 2022..     He has the following restrictions:    No driving or operating heavy machines for 6 months from the date of last seizure.    Avoid sleep deprivation; such as third shift  No supervising children alone  No swimming or bathing alone, climbing ladders or roofs or above own height due to risk of serious injury should a seizure occur.      If you have any questions or concerns, please don't hesitate to call.      Sincerely,         Erma Hatch MD      Medical information is confidential and cannot be disclosed without the written consent of the patient or his representative.    CC:   No Recipients     Nursing report given to [facility]

## 2022-07-21 NOTE — ED TRIAGE NOTES
Brought in by ems with shortness of breath. Dx recently with dementia, not keeping home o2 on. Increase in frequency of neb tx at home. Seen in ed yesterday for shortness of breath.

## 2022-07-21 NOTE — DISCHARGE INSTRUCTIONS
Fill your prescription from yesterday as instructed. Return to the ED for worsening symptoms or for other concerns.

## 2022-07-22 LAB
ATRIAL RATE: 74 BPM
ATRIAL RATE: 81 BPM
ATRIAL RATE: 82 BPM
CALCULATED P AXIS, ECG09: 62 DEGREES
CALCULATED P AXIS, ECG09: 68 DEGREES
CALCULATED P AXIS, ECG09: 68 DEGREES
CALCULATED R AXIS, ECG10: -16 DEGREES
CALCULATED R AXIS, ECG10: -17 DEGREES
CALCULATED R AXIS, ECG10: 7 DEGREES
CALCULATED T AXIS, ECG11: 55 DEGREES
CALCULATED T AXIS, ECG11: 55 DEGREES
CALCULATED T AXIS, ECG11: 56 DEGREES
DIAGNOSIS, 93000: NORMAL
P-R INTERVAL, ECG05: 152 MS
P-R INTERVAL, ECG05: 166 MS
P-R INTERVAL, ECG05: 168 MS
Q-T INTERVAL, ECG07: 396 MS
Q-T INTERVAL, ECG07: 398 MS
Q-T INTERVAL, ECG07: 402 MS
QRS DURATION, ECG06: 80 MS
QRS DURATION, ECG06: 80 MS
QRS DURATION, ECG06: 86 MS
QTC CALCULATION (BEZET), ECG08: 446 MS
QTC CALCULATION (BEZET), ECG08: 462 MS
QTC CALCULATION (BEZET), ECG08: 462 MS
VENTRICULAR RATE, ECG03: 74 BPM
VENTRICULAR RATE, ECG03: 81 BPM
VENTRICULAR RATE, ECG03: 82 BPM

## 2022-08-21 ENCOUNTER — APPOINTMENT (OUTPATIENT)
Dept: CT IMAGING | Age: 77
End: 2022-08-21
Attending: EMERGENCY MEDICINE
Payer: COMMERCIAL

## 2022-08-21 ENCOUNTER — HOSPITAL ENCOUNTER (EMERGENCY)
Age: 77
Discharge: HOME | End: 2022-08-21
Attending: EMERGENCY MEDICINE
Payer: COMMERCIAL

## 2022-08-21 ENCOUNTER — APPOINTMENT (OUTPATIENT)
Dept: GENERAL RADIOLOGY | Age: 77
End: 2022-08-21
Attending: EMERGENCY MEDICINE
Payer: COMMERCIAL

## 2022-08-21 VITALS
SYSTOLIC BLOOD PRESSURE: 139 MMHG | WEIGHT: 200 LBS | HEART RATE: 68 BPM | BODY MASS INDEX: 30.31 KG/M2 | DIASTOLIC BLOOD PRESSURE: 60 MMHG | HEIGHT: 68 IN | TEMPERATURE: 98.5 F | RESPIRATION RATE: 16 BRPM | OXYGEN SATURATION: 100 %

## 2022-08-21 DIAGNOSIS — I48.0 PAROXYSMAL A-FIB (HCC): ICD-10-CM

## 2022-08-21 DIAGNOSIS — J44.1 ACUTE EXACERBATION OF CHRONIC OBSTRUCTIVE PULMONARY DISEASE (COPD) (HCC): Primary | ICD-10-CM

## 2022-08-21 LAB
ALBUMIN SERPL-MCNC: 3 G/DL (ref 3.4–5)
ALBUMIN/GLOB SERPL: 0.8 {RATIO} (ref 0.8–1.7)
ALP SERPL-CCNC: 96 U/L (ref 45–117)
ALT SERPL-CCNC: 11 U/L (ref 16–61)
ANION GAP SERPL CALC-SCNC: 6 MMOL/L (ref 3–18)
ARTERIAL PATENCY WRIST A: POSITIVE
AST SERPL-CCNC: 14 U/L (ref 10–38)
BASE DEFICIT BLD-SCNC: 0.5 MMOL/L
BASOPHILS # BLD: 0.1 K/UL (ref 0–0.1)
BASOPHILS NFR BLD: 1 % (ref 0–2)
BDY SITE: ABNORMAL
BILIRUB SERPL-MCNC: 0.3 MG/DL (ref 0.2–1)
BNP SERPL-MCNC: 319 PG/ML (ref 0–1800)
BUN SERPL-MCNC: 25 MG/DL (ref 7–18)
BUN/CREAT SERPL: 19 (ref 12–20)
CALCIUM SERPL-MCNC: 8.5 MG/DL (ref 8.5–10.1)
CHLORIDE SERPL-SCNC: 107 MMOL/L (ref 100–111)
CO2 SERPL-SCNC: 24 MMOL/L (ref 21–32)
COVID-19 RAPID TEST, COVR: NOT DETECTED
CREAT SERPL-MCNC: 1.3 MG/DL (ref 0.6–1.3)
D DIMER PPP FEU-MCNC: 0.94 UG/ML(FEU)
DIFFERENTIAL METHOD BLD: ABNORMAL
EOSINOPHIL # BLD: 1.5 K/UL (ref 0–0.4)
EOSINOPHIL NFR BLD: 15 % (ref 0–5)
ERYTHROCYTE [DISTWIDTH] IN BLOOD BY AUTOMATED COUNT: 14.2 % (ref 11.6–14.5)
GAS FLOW.O2 O2 DELIVERY SYS: ABNORMAL L/MIN
GAS FLOW.O2 SETTING OXYMISER: 22 BPM
GLOBULIN SER CALC-MCNC: 3.8 G/DL (ref 2–4)
GLUCOSE SERPL-MCNC: 102 MG/DL (ref 74–99)
HCO3 BLD-SCNC: 25.4 MMOL/L (ref 22–26)
HCT VFR BLD AUTO: 33.9 % (ref 36–48)
HGB BLD-MCNC: 10.6 G/DL (ref 13–16)
IMM GRANULOCYTES # BLD AUTO: 0.1 K/UL (ref 0–0.04)
IMM GRANULOCYTES NFR BLD AUTO: 1 % (ref 0–0.5)
LYMPHOCYTES # BLD: 1.8 K/UL (ref 0.9–3.6)
LYMPHOCYTES NFR BLD: 17 % (ref 21–52)
MCH RBC QN AUTO: 27.5 PG (ref 24–34)
MCHC RBC AUTO-ENTMCNC: 31.3 G/DL (ref 31–37)
MCV RBC AUTO: 87.8 FL (ref 78–100)
MONOCYTES # BLD: 0.9 K/UL (ref 0.05–1.2)
MONOCYTES NFR BLD: 9 % (ref 3–10)
NEUTS SEG # BLD: 6.1 K/UL (ref 1.8–8)
NEUTS SEG NFR BLD: 58 % (ref 40–73)
NRBC # BLD: 0 K/UL (ref 0–0.01)
NRBC BLD-RTO: 0 PER 100 WBC
O2/TOTAL GAS SETTING VFR VENT: 28 %
PCO2 BLD: 46.1 MMHG (ref 35–45)
PH BLD: 7.35 [PH] (ref 7.35–7.45)
PLATELET # BLD AUTO: 292 K/UL (ref 135–420)
PMV BLD AUTO: 8.8 FL (ref 9.2–11.8)
PO2 BLD: 94 MMHG (ref 80–100)
POTASSIUM SERPL-SCNC: 5.2 MMOL/L (ref 3.5–5.5)
PROT SERPL-MCNC: 6.8 G/DL (ref 6.4–8.2)
RBC # BLD AUTO: 3.86 M/UL (ref 4.35–5.65)
SAO2 % BLD: 96.8 % (ref 92–97)
SERVICE CMNT-IMP: ABNORMAL
SODIUM SERPL-SCNC: 137 MMOL/L (ref 136–145)
SOURCE, COVRS: NORMAL
SPECIMEN TYPE: ABNORMAL
TROPONIN-HIGH SENSITIVITY: 5 NG/L (ref 0–78)
WBC # BLD AUTO: 10.4 K/UL (ref 4.6–13.2)

## 2022-08-21 PROCEDURE — 87635 SARS-COV-2 COVID-19 AMP PRB: CPT

## 2022-08-21 PROCEDURE — 94640 AIRWAY INHALATION TREATMENT: CPT

## 2022-08-21 PROCEDURE — 82803 BLOOD GASES ANY COMBINATION: CPT

## 2022-08-21 PROCEDURE — 74011250636 HC RX REV CODE- 250/636: Performed by: EMERGENCY MEDICINE

## 2022-08-21 PROCEDURE — 74011000636 HC RX REV CODE- 636: Performed by: EMERGENCY MEDICINE

## 2022-08-21 PROCEDURE — 84484 ASSAY OF TROPONIN QUANT: CPT

## 2022-08-21 PROCEDURE — 85025 COMPLETE CBC W/AUTO DIFF WBC: CPT

## 2022-08-21 PROCEDURE — 85379 FIBRIN DEGRADATION QUANT: CPT

## 2022-08-21 PROCEDURE — 71045 X-RAY EXAM CHEST 1 VIEW: CPT

## 2022-08-21 PROCEDURE — 83880 ASSAY OF NATRIURETIC PEPTIDE: CPT

## 2022-08-21 PROCEDURE — 99285 EMERGENCY DEPT VISIT HI MDM: CPT

## 2022-08-21 PROCEDURE — 74011000250 HC RX REV CODE- 250: Performed by: EMERGENCY MEDICINE

## 2022-08-21 PROCEDURE — 36600 WITHDRAWAL OF ARTERIAL BLOOD: CPT

## 2022-08-21 PROCEDURE — 96374 THER/PROPH/DIAG INJ IV PUSH: CPT

## 2022-08-21 PROCEDURE — 74011250637 HC RX REV CODE- 250/637: Performed by: EMERGENCY MEDICINE

## 2022-08-21 PROCEDURE — 71275 CT ANGIOGRAPHY CHEST: CPT

## 2022-08-21 PROCEDURE — 80053 COMPREHEN METABOLIC PANEL: CPT

## 2022-08-21 RX ORDER — DILTIAZEM HYDROCHLORIDE 30 MG/1
30 TABLET, FILM COATED ORAL
Status: COMPLETED | OUTPATIENT
Start: 2022-08-21 | End: 2022-08-21

## 2022-08-21 RX ORDER — IPRATROPIUM BROMIDE AND ALBUTEROL SULFATE 2.5; .5 MG/3ML; MG/3ML
3 SOLUTION RESPIRATORY (INHALATION)
Status: COMPLETED | OUTPATIENT
Start: 2022-08-21 | End: 2022-08-21

## 2022-08-21 RX ORDER — DILTIAZEM HYDROCHLORIDE 5 MG/ML
5 INJECTION INTRAVENOUS
Status: COMPLETED | OUTPATIENT
Start: 2022-08-21 | End: 2022-08-21

## 2022-08-21 RX ORDER — IPRATROPIUM BROMIDE AND ALBUTEROL SULFATE 2.5; .5 MG/3ML; MG/3ML
6 SOLUTION RESPIRATORY (INHALATION)
Status: COMPLETED | OUTPATIENT
Start: 2022-08-21 | End: 2022-08-21

## 2022-08-21 RX ORDER — DOXYCYCLINE HYCLATE 100 MG
100 TABLET ORAL 2 TIMES DAILY
Qty: 14 TABLET | Refills: 0 | Status: SHIPPED | OUTPATIENT
Start: 2022-08-21 | End: 2022-08-28

## 2022-08-21 RX ORDER — PREDNISONE 10 MG/1
TABLET ORAL
Qty: 39 TABLET | Refills: 0 | Status: SHIPPED | OUTPATIENT
Start: 2022-08-21 | End: 2022-09-03

## 2022-08-21 RX ADMIN — DILTIAZEM HYDROCHLORIDE 5 MG: 5 INJECTION, SOLUTION INTRAVENOUS at 12:21

## 2022-08-21 RX ADMIN — SODIUM CHLORIDE 500 ML: 9 INJECTION, SOLUTION INTRAVENOUS at 09:10

## 2022-08-21 RX ADMIN — IOPAMIDOL 100 ML: 755 INJECTION, SOLUTION INTRAVENOUS at 10:29

## 2022-08-21 RX ADMIN — DILTIAZEM HYDROCHLORIDE 30 MG: 30 TABLET, FILM COATED ORAL at 12:21

## 2022-08-21 RX ADMIN — IPRATROPIUM BROMIDE AND ALBUTEROL SULFATE 3 ML: .5; 3 SOLUTION RESPIRATORY (INHALATION) at 07:22

## 2022-08-21 RX ADMIN — IPRATROPIUM BROMIDE AND ALBUTEROL SULFATE 6 ML: .5; 3 SOLUTION RESPIRATORY (INHALATION) at 07:40

## 2022-08-21 NOTE — ED PROVIDER NOTES
EMERGENCY DEPARTMENT HISTORY AND PHYSICAL EXAM      Date: 8/21/2022  Patient Name: June Astudillo      History of Presenting Illness     Chief Complaint   Patient presents with    Respiratory Distress       History Provided By: Patient and EMS    Location/Duration/Severity/Modifying factors   This patient is a 70-year-old gentleman with a history of advanced COPD on 2 L at home presenting with chief complaint of shortness of breath. He denies a cough and denies any chest pain states symptoms have been present over the past 24 hours worsening. He has a history of chronic respiratory failure with frequent ER visits for similar symptoms. He is very hard of hearing and this to some degree limits the history taking. He denies any fevers or chills. Denies any known sick contacts. In route EMS gave him 1 albuterol and Atrovent treatment as well as 125 mg of Solu-Medrol. Patient states he is not currently taking any prednisone or other steroids. There are no other complaints, changes, or physical findings at this time. PCP: Lanre oTvar MD    Current Outpatient Medications   Medication Sig Dispense Refill    predniSONE (DELTASONE) 10 mg tablet Take 50 mg by mouth daily (with breakfast) for 3 days, THEN 40 mg daily (with breakfast) for 2 days, THEN 30 mg daily (with breakfast) for 3 days, THEN 20 mg daily (with breakfast) for 2 days, THEN 10 mg daily (with breakfast) for 3 days. 39 Tablet 0    doxycycline (VIBRA-TABS) 100 mg tablet Take 1 Tablet by mouth two (2) times a day for 7 days. 14 Tablet 0    albuterol (PROVENTIL HFA, VENTOLIN HFA, PROAIR HFA) 90 mcg/actuation inhaler Take 2 Puffs by inhalation every four (4) hours as needed for Wheezing or Shortness of Breath. 1 Each 3    DAPTOMYCIN  mg by IntraVENous route daily. in 25mL NS      sodium chloride (Normal Saline Flush) 5-10 mL by IntraVENous route daily.  flush IV catheter with 10ml before and after infusing each dose of medication as directed. heparin sodium,porcine (HEPARIN LOCK FLUSH IV) 3 mL by IntraVENous route daily. flush IV catheter with 3ml of heparin 10units/mL after the last dose of 0.9% sodium chloride flush, after each dose of medication or as directed. flush IV catheter every day if not in use. ferrous sulfate 325 mg (65 mg iron) tablet Take 1 Tablet by mouth two (2) times daily (with meals). 60 Tablet 3    apixaban (ELIQUIS) 5 mg tablet Take 1 Tablet by mouth two (2) times a day. 60 Tablet 2    tamsulosin (FLOMAX) 0.4 mg capsule Take 1 Capsule by mouth every evening. 30 Capsule 3    furosemide (Lasix) 20 mg tablet Take 1 Tablet by mouth daily. 30 Tablet 3    amLODIPine (NORVASC) 10 mg tablet Take 1 Tablet by mouth daily. 30 Tablet 3    albuterol-ipratropium (DUO-NEB) 2.5 mg-0.5 mg/3 ml nebu 3 mL by Nebulization route every four (4) hours as needed for Wheezing. 30 Nebule 3    pantoprazole (PROTONIX) 40 mg tablet Take 1 Tablet by mouth Before breakfast and dinner. 60 Tablet 0    OXYGEN-AIR DELIVERY SYSTEMS 2 L/min by Nasal route daily as needed for PRN Reason (Other) (SOB/wheezing). dextran 70/hypromellose (ARTIFICIAL TEARS, PF, OP) Administer 2 Drops to both eyes daily as needed for Other (dry eyes).          Past History     Past Medical History:  Past Medical History:   Diagnosis Date    BMI 30.0-30.9,adult 4/2/2022    Brain aneurysm     Brain aneurysm     Cancer [facility])     prostate    CHF (congestive heart failure) (HCC)     CKD (chronic kidney disease)     Closed nondisplaced supracondylar fracture of lower end of left femur without intracondylar extension with delayed healing     COPD (chronic obstructive pulmonary disease) ([address]     Debility     History of home oxygen therapy     [place] (hard of hearing)     Hypertension     Nocturia     On home oxygen therapy     PAF (paroxysmal atrial fibrillation) (HCC)     Pneumonia     Prostate CA ([address]     Prostate neoplasm     Radiation effect        Past Surgical History:  Past Surgical History:   Procedure Laterality Date    HX HERNIA REPAIR      HX HIP ARTHROSCOPY  04/09/2021       Family History:  Family History   Problem Relation Age of Onset    Heart Disease Mother        Social History:  Social History     Tobacco Use    Smoking status: Former     Types: Cigarettes    Smokeless tobacco: Never   Substance Use Topics    Alcohol use: No    Drug use: Never       Allergies: Allergies   Allergen Reactions    Aspirin Other (comments)     \"messes my stomach up\"    Bactrim [Sulfamethoxazole-Trimethoprim] Unknown (comments)    Nsaids (Non-Steroidal Anti-Inflammatory Drug) Unknown (comments)         Review of Systems     Review of Systems   Constitutional:  Negative for chills and fever. HENT:  Negative for congestion, rhinorrhea, sinus pressure and sneezing. Eyes:  Negative for visual disturbance. Respiratory:  Positive for shortness of breath and wheezing. Negative for cough. Cardiovascular:  Negative for chest pain. Gastrointestinal:  Negative for abdominal pain, diarrhea, nausea and vomiting. Genitourinary:  Negative for dysuria, frequency and urgency. Musculoskeletal:  Negative for back pain and neck pain. Skin:  Negative for rash. Neurological:  Negative for syncope, numbness and headaches. Physical Exam     Physical Exam  Constitutional:       General: He is not in acute distress. Appearance: Normal appearance. He is normal weight. He is not ill-appearing or toxic-appearing. HENT:      Head: Normocephalic and atraumatic. Right Ear: External ear normal.      Left Ear: External ear normal.      Nose: Nose normal.      Mouth/Throat:      Mouth: Mucous membranes are moist.      Pharynx: No oropharyngeal exudate or posterior oropharyngeal erythema. Eyes:      Conjunctiva/sclera: Conjunctivae normal.      Pupils: Pupils are equal, round, and reactive to light. Cardiovascular:      Rate and Rhythm: Normal rate and regular rhythm. Pulses: Normal pulses. Heart sounds: Normal heart sounds. No murmur heard. No friction rub. Pulmonary:      Effort: Pulmonary effort is normal.      Breath sounds: Wheezing (Diminished air entry, with occasional scattered wheezes. ) present. No rhonchi or rales. Comments: He is minimally tachypneic, able to speak in several word sentences. He has a loud expiratory groan that the respiratory therapist states is chronic for him. Abdominal:      General: Abdomen is flat. Tenderness: There is no abdominal tenderness. There is no guarding or rebound. Musculoskeletal:         General: No swelling or tenderness. Normal range of motion. Cervical back: Normal range of motion and neck supple. Right lower leg: No edema. Left lower leg: No edema. Skin:     General: Skin is warm and dry. Capillary Refill: Capillary refill takes less than 2 seconds. Findings: No rash. Neurological:      General: No focal deficit present. Mental Status: He is alert. Motor: No weakness.        Lab and Diagnostic Study Results     Labs -  Recent Results (from the past 24 hour(s))   EKG, 12 LEAD, INITIAL    Collection Time: 08/22/22  8:54 AM   Result Value Ref Range    Ventricular Rate 91 BPM    Atrial Rate 91 BPM    P-R Interval 156 ms    QRS Duration 78 ms    Q-T Interval 358 ms    QTC Calculation (Bezet) 440 ms    Calculated P Axis 76 degrees    Calculated R Axis 22 degrees    Calculated T Axis 67 degrees    Diagnosis       Normal sinus rhythm  Normal ECG  When compared with ECG of 21-AUG-2022 07:02,  No significant change was found     CULTURE, BLOOD    Collection Time: 08/22/22  9:10 AM    Specimen: Blood   Result Value Ref Range    Special Requests: NO SPECIAL REQUESTS      Culture result: NO GROWTH AFTER 22 HOURS     CULTURE, BLOOD    Collection Time: 08/22/22  9:10 AM    Specimen: Blood   Result Value Ref Range    Special Requests: NO SPECIAL REQUESTS      Culture result: NO GROWTH AFTER 22 HOURS     METABOLIC PANEL, COMPREHENSIVE    Collection Time: 08/22/22  9:10 AM   Result Value Ref Range    Sodium 136 136 - 145 mmol/L    Potassium 4.4 3.5 - 5.5 mmol/L    Chloride 103 100 - 111 mmol/L    CO2 25 21 - 32 mmol/L    Anion gap 8 3.0 - 18 mmol/L    Glucose 116 (H) 74 - 99 mg/dL    BUN 32 (H) 7.0 - 18 MG/DL    Creatinine 1.47 (H) 0.6 - 1.3 MG/DL    BUN/Creatinine ratio 22 (H) 12 - 20      GFR est AA 56 (L) >60 ml/min/1.73m2    GFR est non-AA 46 (L) >60 ml/min/1.73m2    Calcium 8.4 (L) 8.5 - 10.1 MG/DL    Bilirubin, total 0.4 0.2 - 1.0 MG/DL    ALT (SGPT) 12 (L) 16 - 61 U/L    AST (SGOT) 10 10 - 38 U/L    Alk. phosphatase 91 45 - 117 U/L    Protein, total 6.1 (L) 6.4 - 8.2 g/dL    Albumin 3.1 (L) 3.4 - 5.0 g/dL    Globulin 3.0 2.0 - 4.0 g/dL    A-G Ratio 1.0 0.8 - 1.7     CBC WITH AUTOMATED DIFF    Collection Time: 08/22/22  9:10 AM   Result Value Ref Range    WBC 10.0 4.6 - 13.2 K/uL    RBC 3.59 (L) 4.35 - 5.65 M/uL    HGB 9.9 (L) 13.0 - 16.0 g/dL    HCT 31.1 (L) 36.0 - 48.0 %    MCV 86.6 78.0 - 100.0 FL    MCH 27.6 24.0 - 34.0 PG    MCHC 31.8 31.0 - 37.0 g/dL    RDW 13.9 11.6 - 14.5 %    PLATELET 721 135 - 483 K/uL    MPV 9.1 (L) 9.2 - 11.8 FL    NRBC 0.0 0  WBC    ABSOLUTE NRBC 0.00 0.00 - 0.01 K/uL    NEUTROPHILS 80 (H) 40 - 73 %    LYMPHOCYTES 11 (L) 21 - 52 %    MONOCYTES 8 3 - 10 %    EOSINOPHILS 0 0 - 5 %    BASOPHILS 0 0 - 2 %    IMMATURE GRANULOCYTES 1 (H) 0.0 - 0.5 %    ABS. NEUTROPHILS 8.0 1.8 - 8.0 K/UL    ABS. LYMPHOCYTES 1.1 0.9 - 3.6 K/UL    ABS. MONOCYTES 0.8 0.05 - 1.2 K/UL    ABS. EOSINOPHILS 0.0 0.0 - 0.4 K/UL    ABS. BASOPHILS 0.0 0.0 - 0.1 K/UL    ABS. IMM.  GRANS. 0.1 (H) 0.00 - 0.04 K/UL    DF AUTOMATED     MAGNESIUM    Collection Time: 08/22/22  9:10 AM   Result Value Ref Range    Magnesium 2.5 1.6 - 2.6 mg/dL   TROPONIN-HIGH SENSITIVITY    Collection Time: 08/22/22  9:10 AM   Result Value Ref Range    Troponin-High Sensitivity 6 0 - 78 ng/L   NT-PRO BNP Collection Time: 08/22/22  9:10 AM   Result Value Ref Range    NT pro-BNP 1,049 0 - 1,800 PG/ML   LACTIC ACID    Collection Time: 08/22/22  9:10 AM   Result Value Ref Range    Lactic acid 1.4 0.4 - 2.0 MMOL/L   URINALYSIS W/ RFLX MICROSCOPIC    Collection Time: 08/22/22 12:00 PM   Result Value Ref Range    Color YELLOW      Appearance CLEAR      Specific gravity 1.016 1.005 - 1.030      pH (UA) 5.5 5.0 - 8.0      Protein Negative NEG mg/dL    Glucose Negative NEG mg/dL    Ketone Negative NEG mg/dL    Bilirubin Negative NEG      Blood Negative NEG      Urobilinogen 0.2 0.2 - 1.0 EU/dL    Nitrites Negative NEG      Leukocyte Esterase Negative NEG           Radiologic Studies -   CTA CHEST W OR W WO CONT   Final Result      Suboptimal opacification of the pulmonary arteries but with no gross filling   defect to suggest embolism. Other chronic changes as described above are stable. XR CHEST PORT   Final Result      Hyperinflated lungs most likely due to underlying emphysema. Interstitial   prominence likely represent chronic fibrosis. No new abnormality is seen. Medical Decision Making and ED Course   - I am the first and primary provider for this patient AND AM THE PRIMARY PROVIDER OF RECORD. - I reviewed the vital signs, available nursing notes, past medical history, past surgical history, family history and social history. - Initial assessment performed. The patients presenting problems have been discussed, and the staff are in agreement with the care plan formulated and outlined with them. I have encouraged them to ask questions as they arise throughout their visit. Vital Signs-Reviewed the patient's vital signs. No data found. EKG interpretation: Normal sinus rhythm, rate of 83. Normal CO, QRS and QTc intervals. Normal axis. No ST segment elevation or depression. Overall normal sinus rhythm and normal EKG.       Records Reviewed: Nursing Notes, Old Medical Records, Previous electrocardiograms, Previous Radiology Studies, and Previous Laboratory Studies        Provider Notes (Medical Decision Making):     MDM  Number of Diagnoses or Management Options  Acute exacerbation of chronic obstructive pulmonary disease (COPD) ([facility]  Paroxysmal A-fib ([facility]  Diagnosis management comments: 68-year-old male presenting with chief complaint of wheezing and shortness of breath. Chronic respiratory failure with COPD. On my exam he states he is feeling somewhat better. We will give him a few more treatments, check chest x-ray, rule out pneumonia. Less likely pulmonary embolism, he is doing okay on his 2 L right now. We will see how he does with more treatments. DDx COPD, pneumonia, pleural effusion, CHF, ACS, asthma, respiratory failure, etc.    Patient's overall work-up is pretty reassuring. PE study somewhat suboptimal but no filling defects identified. He had 1 episode where he went to look in rapid A. fib which rapidly responded to administration of diltiazem. I also given his home oral diltiazem and this looks to be consistent with chronic atrial fibrillation. His work-up is reassuring he felt better and wanted to go home. He received steroids by EMS and I will prescribe him a long taper of prednisone as well as doxycycline to take at home. He is somewhat noncompliant with his home medications however seen this may be due to his underlying dementia. We will plan to discharge him home as he feels much better and wants to go home. Advised to continue home regimen and return to the emergency room if he is feeling worse in the meantime. At this time, patient is stable and appropriate for discharge home. Patient demonstrates understanding of current diagnoses and is in agreement with the treatment plan. They are advised that while the likelihood of serious underlying condition is low at this point given the evaluation performed today, we cannot fully rule it out. They are advised to immediately return with any new symptoms or worsening of current condition. Any Incidental findings were noted on the patient's discharge paperwork as well as verbally directly to the patient, and the appropriate follow-up was given to the patient as far as instructions on testing needed as well as the timeframe. All questions have been answered. Patient is given educational material regarding their diagnoses, including danger symptoms and when to return to the ED. This note was dictated utilizing Dragon voice recognition software. Unfortunately this leads to occasional typographical errors. I apologize in advance if the situation occurs. If questions occur please do not hesitate to contact me directly. Toby Tovar DO         ED Course:       ED Course as of 08/23/22 8436   Angelique Graham Aug 21, 2022   1201 On reassessment of the patient patient states he is feeling better and he would prefer to go home. His heart rate noted to be quite elevated looks to be A. fib on the monitor on review of his chart he has a history of chronic A. fib although it looks more paroxysmal.  He is supposed to be on Cardizem does not look like he took it today based on his time of arrival and the patient's history. I will give him both oral and IV dose of diltiazem for rate control. If his rate can be controlled he can be discharged home. [GRAZYNA]   5292 Now back in sinus rhythm. He is suitable for discharge. Feeling much better.  [GRAZYNA]      ED Course User Index  [GRAZYNA] Brent Duran, DO     ------------------------------------------------------------------------------------------------------------        Consultations:       Consultations: - NONE        Procedures and Critical Care       Performed by: Toby Tovar DO    Procedures             CRITICAL CARE NOTE :  7:39 AM  CRITICAL CARE TIME: I have spent 40 minutes of critical care time involved in lab review, consultations with specialist, family decision-making, and documentation. During this entire length of time I was immediately available to the patient. Critical Care: The reason for providing this level of medical care for this critically ill patient was due a critical illness that impaired one or more vital organ systems such that there was a high probability of imminent or life threatening deterioration in the patients condition. This care involved high complexity decision making to assess, manipulate, and support vital system functions, to treat this vital organ system failure and to prevent further life threatening deterioration of the patients condition. Time is exclusive of procedural and teaching time. Tim Wiggins, DO Tim Wiggins,         Disposition         Diagnosis:   1. Acute exacerbation of chronic obstructive pulmonary disease (COPD) ([facility]    2. Paroxysmal A-fib (HCC)          Disposition: DISCHARGE    Follow-up Information       Follow up With Specialties Details Why Contact Info    Alejandro Alaniz MD Family Medicine Call today  [address] [facility] [address] [facility] [address] [facility] EMERGENCY DEPT Emergency Medicine  As needed, If symptoms worsen; or [facility] Emergency Department [address] [place] [address]  [phone]            Discharge Medication List as of 8/21/2022  2:04 PM        START taking these medications    Details   predniSONE (DELTASONE) 10 mg tablet Take 50 mg by mouth daily (with breakfast) for 3 days, THEN 40 mg daily (with breakfast) for 2 days, THEN 30 mg daily (with breakfast) for 3 days, THEN 20 mg daily (with breakfast) for 2 days, THEN 10 mg daily (with breakfast) for 3 days. , Normal, Disp-3 9 Tablet, R-0      doxycycline (VIBRA-TABS) 100 mg tablet Take 1 Tablet by mouth two (2) times a day for 7 days. , Normal, Disp-14 Tablet, R-0           CONTINUE these medications which have NOT CHANGED    Details   albuterol (PROVENTIL HFA, VENTOLIN HFA, PROAIR HFA) 90 mcg/actuation inhaler Take 2 Puffs by inhalation every four (4) hours as needed for Wheezing or Shortness of Breath., Normal, Disp-1 Each, R-3      DAPTOMYCIN  mg by IntraVENous route daily. in 25mL NS, Historical Med      sodium chloride (Normal Saline Flush) 5-10 mL by IntraVENous route daily. flush IV catheter with 10ml before and after infusing each dose of medication as directed., Historical Med      heparin sodium,porcine (HEPARIN LOCK FLUSH IV) 3 mL by IntraVENous route daily. flush IV catheter with 3ml of heparin 10units/mL after the last dose of 0.9% sodium chloride flush, after each dose of medication or as directed. flush IV catheter every day if not in use., Historical Med      ferrous sulfate 325 mg (65 mg iron) tablet Take 1 Tablet by mouth two (2) times daily (with meals). , Normal, Disp-60 Tablet, R-3      apixaban (ELIQUIS) 5 mg tablet Take 1 Tablet by mouth two (2) times a day., Normal, Disp-60 Tablet, R-2      tamsulosin (FLOMAX) 0.4 mg capsule Take 1 Capsule by mouth every evening., Normal, Disp-30 Capsule, R-3      furosemide (Lasix) 20 mg tablet Take 1 Tablet by mouth daily. , Normal, Disp-30 Tablet, R-3      amLODIPine (NORVASC) 10 mg tablet Take 1 Tablet by mouth daily. , Normal, Disp-30 Tablet, R-3      albuterol-ipratropium (DUO-NEB) 2.5 mg-0.5 mg/3 ml nebu 3 mL by Nebulization route every four (4) hours as needed for Wheezing., Normal, Disp-30 Nebule, R-3      pantoprazole (PROTONIX) 40 mg tablet Take 1 Tablet by mouth Before breakfast and dinner., Normal, Disp-60 Tablet, R-0      OXYGEN-AIR DELIVERY SYSTEMS 2 L/min by Nasal route daily as needed for PRN Reason (Other) (SOB/wheezing). , Historical Med      dextran 70/hypromellose (ARTIFICIAL TEARS, PF, OP) Administer 2 Drops to both eyes daily as needed for Other (dry eyes). , Historical Med               Diagnosis     Clinical Impression:   1.  Acute exacerbation of chronic obstructive pulmonary disease (COPD) (HCC)    2. Paroxysmal A-fib ([address] Attestations:    Franklyn Prado, DO    Please note that this dictation was completed with [employer], the computer voice recognition software. Quite often unanticipated grammatical, syntax, homophones, and other interpretive errors are inadvertently transcribed by the computer software. Please disregard these errors. Please excuse any errors that have escaped final proofreading. Thank you.

## 2022-08-21 NOTE — ED NOTES
I have reviewed discharge instructions with the patient. The patient verbalized understanding. Patient armband removed and shredded    Daughter came to pickup.

## 2022-08-21 NOTE — ED TRIAGE NOTES
Pt arrived via EMS on Bipap for shortness of breath. Pt was given A&A treatment and 125 solumedrol in route. Pt is comfortable on 2 liters via nasal canula. Respiratory at the bedside.

## 2022-08-21 NOTE — DISCHARGE INSTRUCTIONS
Take your medications as prescribed. I have prescribed you a prednisone course to take it as prescribed on the instructions that should be done as a taper. Follow-up with your primary care doctor in 2 days for recheck at the latest.    Return to the emergency department if you are worsening in any way in the meantime.

## 2022-08-22 ENCOUNTER — APPOINTMENT (OUTPATIENT)
Dept: GENERAL RADIOLOGY | Age: 77
End: 2022-08-22
Attending: EMERGENCY MEDICINE
Payer: COMMERCIAL

## 2022-08-22 ENCOUNTER — HOSPITAL ENCOUNTER (EMERGENCY)
Age: 77
Discharge: HOME | End: 2022-08-22
Attending: EMERGENCY MEDICINE
Payer: COMMERCIAL

## 2022-08-22 VITALS
HEART RATE: 64 BPM | OXYGEN SATURATION: 100 % | TEMPERATURE: 98.1 F | WEIGHT: 200 LBS | RESPIRATION RATE: 18 BRPM | DIASTOLIC BLOOD PRESSURE: 49 MMHG | SYSTOLIC BLOOD PRESSURE: 140 MMHG | BODY MASS INDEX: 30.31 KG/M2 | HEIGHT: 68 IN

## 2022-08-22 DIAGNOSIS — J44.1 CHRONIC OBSTRUCTIVE PULMONARY DISEASE WITH ACUTE EXACERBATION (HCC): Primary | ICD-10-CM

## 2022-08-22 LAB
ALBUMIN SERPL-MCNC: 3.1 G/DL (ref 3.4–5)
ALBUMIN/GLOB SERPL: 1 {RATIO} (ref 0.8–1.7)
ALP SERPL-CCNC: 91 U/L (ref 45–117)
ALT SERPL-CCNC: 12 U/L (ref 16–61)
ANION GAP SERPL CALC-SCNC: 8 MMOL/L (ref 3–18)
APPEARANCE UR: CLEAR
AST SERPL-CCNC: 10 U/L (ref 10–38)
ATRIAL RATE: 83 BPM
BASOPHILS # BLD: 0 K/UL (ref 0–0.1)
BASOPHILS NFR BLD: 0 % (ref 0–2)
BILIRUB SERPL-MCNC: 0.4 MG/DL (ref 0.2–1)
BILIRUB UR QL: NEGATIVE
BNP SERPL-MCNC: 1049 PG/ML (ref 0–1800)
BUN SERPL-MCNC: 32 MG/DL (ref 7–18)
BUN/CREAT SERPL: 22 (ref 12–20)
CALCIUM SERPL-MCNC: 8.4 MG/DL (ref 8.5–10.1)
CALCULATED P AXIS, ECG09: 64 DEGREES
CALCULATED R AXIS, ECG10: 12 DEGREES
CALCULATED T AXIS, ECG11: 68 DEGREES
CHLORIDE SERPL-SCNC: 103 MMOL/L (ref 100–111)
CO2 SERPL-SCNC: 25 MMOL/L (ref 21–32)
COLOR UR: YELLOW
CREAT SERPL-MCNC: 1.47 MG/DL (ref 0.6–1.3)
DIAGNOSIS, 93000: NORMAL
DIFFERENTIAL METHOD BLD: ABNORMAL
EOSINOPHIL # BLD: 0 K/UL (ref 0–0.4)
EOSINOPHIL NFR BLD: 0 % (ref 0–5)
ERYTHROCYTE [DISTWIDTH] IN BLOOD BY AUTOMATED COUNT: 13.9 % (ref 11.6–14.5)
GLOBULIN SER CALC-MCNC: 3 G/DL (ref 2–4)
GLUCOSE SERPL-MCNC: 116 MG/DL (ref 74–99)
GLUCOSE UR STRIP.AUTO-MCNC: NEGATIVE MG/DL
HCT VFR BLD AUTO: 31.1 % (ref 36–48)
HGB BLD-MCNC: 9.9 G/DL (ref 13–16)
HGB UR QL STRIP: NEGATIVE
IMM GRANULOCYTES # BLD AUTO: 0.1 K/UL (ref 0–0.04)
IMM GRANULOCYTES NFR BLD AUTO: 1 % (ref 0–0.5)
KETONES UR QL STRIP.AUTO: NEGATIVE MG/DL
LACTATE SERPL-SCNC: 1.4 MMOL/L (ref 0.4–2)
LEUKOCYTE ESTERASE UR QL STRIP.AUTO: NEGATIVE
LYMPHOCYTES # BLD: 1.1 K/UL (ref 0.9–3.6)
LYMPHOCYTES NFR BLD: 11 % (ref 21–52)
MAGNESIUM SERPL-MCNC: 2.5 MG/DL (ref 1.6–2.6)
MCH RBC QN AUTO: 27.6 PG (ref 24–34)
MCHC RBC AUTO-ENTMCNC: 31.8 G/DL (ref 31–37)
MCV RBC AUTO: 86.6 FL (ref 78–100)
MONOCYTES # BLD: 0.8 K/UL (ref 0.05–1.2)
MONOCYTES NFR BLD: 8 % (ref 3–10)
NEUTS SEG # BLD: 8 K/UL (ref 1.8–8)
NEUTS SEG NFR BLD: 80 % (ref 40–73)
NITRITE UR QL STRIP.AUTO: NEGATIVE
NRBC # BLD: 0 K/UL (ref 0–0.01)
NRBC BLD-RTO: 0 PER 100 WBC
P-R INTERVAL, ECG05: 158 MS
PH UR STRIP: 5.5 [PH] (ref 5–8)
PLATELET # BLD AUTO: 292 K/UL (ref 135–420)
PMV BLD AUTO: 9.1 FL (ref 9.2–11.8)
POTASSIUM SERPL-SCNC: 4.4 MMOL/L (ref 3.5–5.5)
PROT SERPL-MCNC: 6.1 G/DL (ref 6.4–8.2)
PROT UR STRIP-MCNC: NEGATIVE MG/DL
Q-T INTERVAL, ECG07: 372 MS
QRS DURATION, ECG06: 74 MS
QTC CALCULATION (BEZET), ECG08: 437 MS
RBC # BLD AUTO: 3.59 M/UL (ref 4.35–5.65)
SODIUM SERPL-SCNC: 136 MMOL/L (ref 136–145)
SP GR UR REFRACTOMETRY: 1.02 (ref 1–1.03)
TROPONIN-HIGH SENSITIVITY: 6 NG/L (ref 0–78)
UROBILINOGEN UR QL STRIP.AUTO: 0.2 EU/DL (ref 0.2–1)
VENTRICULAR RATE, ECG03: 83 BPM
WBC # BLD AUTO: 10 K/UL (ref 4.6–13.2)

## 2022-08-22 PROCEDURE — 83880 ASSAY OF NATRIURETIC PEPTIDE: CPT

## 2022-08-22 PROCEDURE — 96374 THER/PROPH/DIAG INJ IV PUSH: CPT

## 2022-08-22 PROCEDURE — 99285 EMERGENCY DEPT VISIT HI MDM: CPT

## 2022-08-22 PROCEDURE — 83605 ASSAY OF LACTIC ACID: CPT

## 2022-08-22 PROCEDURE — 80053 COMPREHEN METABOLIC PANEL: CPT

## 2022-08-22 PROCEDURE — 94640 AIRWAY INHALATION TREATMENT: CPT

## 2022-08-22 PROCEDURE — 74011250636 HC RX REV CODE- 250/636: Performed by: EMERGENCY MEDICINE

## 2022-08-22 PROCEDURE — 84484 ASSAY OF TROPONIN QUANT: CPT

## 2022-08-22 PROCEDURE — 87040 BLOOD CULTURE FOR BACTERIA: CPT

## 2022-08-22 PROCEDURE — 74011000250 HC RX REV CODE- 250: Performed by: EMERGENCY MEDICINE

## 2022-08-22 PROCEDURE — 93005 ELECTROCARDIOGRAM TRACING: CPT

## 2022-08-22 PROCEDURE — 83735 ASSAY OF MAGNESIUM: CPT

## 2022-08-22 PROCEDURE — 85025 COMPLETE CBC W/AUTO DIFF WBC: CPT

## 2022-08-22 PROCEDURE — 81003 URINALYSIS AUTO W/O SCOPE: CPT

## 2022-08-22 PROCEDURE — 71045 X-RAY EXAM CHEST 1 VIEW: CPT

## 2022-08-22 RX ORDER — IPRATROPIUM BROMIDE AND ALBUTEROL SULFATE 2.5; .5 MG/3ML; MG/3ML
3 SOLUTION RESPIRATORY (INHALATION)
Status: COMPLETED | OUTPATIENT
Start: 2022-08-22 | End: 2022-08-22

## 2022-08-22 RX ORDER — SODIUM CHLORIDE 0.9 % (FLUSH) 0.9 %
5-10 SYRINGE (ML) INJECTION AS NEEDED
Status: DISCONTINUED | OUTPATIENT
Start: 2022-08-22 | End: 2022-08-22 | Stop reason: HOSPADM

## 2022-08-22 RX ADMIN — IPRATROPIUM BROMIDE AND ALBUTEROL SULFATE 3 ML: .5; 3 SOLUTION RESPIRATORY (INHALATION) at 11:42

## 2022-08-22 RX ADMIN — METHYLPREDNISOLONE SODIUM SUCCINATE 125 MG: 125 INJECTION, POWDER, FOR SOLUTION INTRAMUSCULAR; INTRAVENOUS at 10:44

## 2022-08-22 RX ADMIN — IPRATROPIUM BROMIDE AND ALBUTEROL SULFATE 3 ML: .5; 3 SOLUTION RESPIRATORY (INHALATION) at 10:00

## 2022-08-22 NOTE — DISCHARGE INSTRUCTIONS
Continue your steroids as prescribed for you yesterday. Continue your nebulizers at home as prescribed for you. Return to the ED for worsening symptoms or for other concerns. You need to follow-up with your primary care doctor to discuss the need for in-home home health visits.

## 2022-08-22 NOTE — ED NOTES
Spoke with wife and daughter for updates. Daughter states she will be here for patient  in twenty minutes.

## 2022-08-22 NOTE — ED NOTES
Patients daughter states patient does not have portable oxygen. States that she will follow up with primary care provider for home health care. I have reviewed discharge instructions with the daughter. The daughter verbalized understanding.

## 2022-08-22 NOTE — ED PROVIDER NOTES
EMERGENCY DEPARTMENT HISTORY AND PHYSICAL EXAM    Date: 8/22/2022  Patient Name: Melyssa Unger    History of Presenting Illness     Chief Complaint   Patient presents with    Respiratory Distress       History Provided By: Patient and EMS     History Julisa Rodgers):   8:59 AM  Melyssa Unger is a 76 y.o. male with a PMHX of CHF, COPD, CKD, prostate cancer, hypertension  who presents to the emergency department (room 1) by EMS C/O dyspnea onset 2 days ago. Associated sxs include wheezing, chest tightness. Pt denies pain or any other sxs or complaints. Per EMS, patient was attempting to use his albuterol treatments at home when they arrived. Per chart review, he was seen yesterday in the ED treated and released home. Yesterday, patient got Solu-Medrol in the ambulance and albuterol treatments in the ED. Patient is markedly hard of hearing which limits his history. He is noted to be on a baseline 2 L/min of oxygen by nasal cannula at home. This patient has an acute life-threatening emergency that requires my immediate attention. Chief Complaint: Dyspnea  Onset: 2 days ago  Timing:  Acute on Chronic  Context:  COPD brought symptoms on, symptoms have rapidly worsened since onset  Location: Lungs  Quality: Tightness  Severity: Severe  Modifying Factors: Nothing makes it better, or worse.   Associated Symptoms:  Chest tightness, wheezing    PCP: Sally Funk MD     Past History         Past Medical History:  Past Medical History:   Diagnosis Date    BMI 30.0-30.9,adult 4/2/2022    Brain aneurysm     Brain aneurysm     Cancer [facility])     prostate    CHF (congestive heart failure) (HCC)     CKD (chronic kidney disease)     Closed nondisplaced supracondylar fracture of lower end of left femur without intracondylar extension with delayed healing     COPD (chronic obstructive pulmonary disease) ([facility] [address]     Debility     History of home oxygen therapy     [place] (hard of hearing)     Hypertension     Nocturia     On home oxygen therapy     PAF (paroxysmal atrial fibrillation) (HCC)     Pneumonia     Prostate CA ([facility] [address]     Prostate neoplasm     Radiation effect        Past Surgical History:  Past Surgical History:   Procedure Laterality Date    HX HERNIA REPAIR      HX HIP ARTHROSCOPY  04/09/2021       Family History:  Family History   Problem Relation Age of Onset    Heart Disease Mother    Reviewed and non-contributory    Social History:  Social History     Tobacco Use    Smoking status: Former     Types: Cigarettes    Smokeless tobacco: Never   Substance Use Topics    Alcohol use: No    Drug use: Never       Medications:  Current Outpatient Medications   Medication Sig Dispense Refill    predniSONE (DELTASONE) 10 mg tablet Take 50 mg by mouth daily (with breakfast) for 3 days, THEN 40 mg daily (with breakfast) for 2 days, THEN 30 mg daily (with breakfast) for 3 days, THEN 20 mg daily (with breakfast) for 2 days, THEN 10 mg daily (with breakfast) for 3 days. 39 Tablet 0    doxycycline (VIBRA-TABS) 100 mg tablet Take 1 Tablet by mouth two (2) times a day for 7 days. 14 Tablet 0    albuterol (PROVENTIL HFA, VENTOLIN HFA, PROAIR HFA) 90 mcg/actuation inhaler Take 2 Puffs by inhalation every four (4) hours as needed for Wheezing or Shortness of Breath. 1 Each 3    DAPTOMYCIN  mg by IntraVENous route daily. in 25mL NS      sodium chloride (Normal Saline Flush) 5-10 mL by IntraVENous route daily. flush IV catheter with 10ml before and after infusing each dose of medication as directed. heparin sodium,porcine (HEPARIN LOCK FLUSH IV) 3 mL by IntraVENous route daily. flush IV catheter with 3ml of heparin 10units/mL after the last dose of 0.9% sodium chloride flush, after each dose of medication or as directed. flush IV catheter every day if not in use. ferrous sulfate 325 mg (65 mg iron) tablet Take 1 Tablet by mouth two (2) times daily (with meals).  60 Tablet 3    apixaban (ELIQUIS) 5 mg tablet Take 1 Tablet by mouth two (2) times a day. 60 Tablet 2    tamsulosin (FLOMAX) 0.4 mg capsule Take 1 Capsule by mouth every evening. 30 Capsule 3    furosemide (Lasix) 20 mg tablet Take 1 Tablet by mouth daily. 30 Tablet 3    amLODIPine (NORVASC) 10 mg tablet Take 1 Tablet by mouth daily. 30 Tablet 3    albuterol-ipratropium (DUO-NEB) 2.5 mg-0.5 mg/3 ml nebu 3 mL by Nebulization route every four (4) hours as needed for Wheezing. 30 Nebule 3    pantoprazole (PROTONIX) 40 mg tablet Take 1 Tablet by mouth Before breakfast and dinner. 60 Tablet 0    OXYGEN-AIR DELIVERY SYSTEMS 2 L/min by Nasal route daily as needed for PRN Reason (Other) (SOB/wheezing). dextran 70/hypromellose (ARTIFICIAL TEARS, PF, OP) Administer 2 Drops to both eyes daily as needed for Other (dry eyes). Allergies: Allergies   Allergen Reactions    Aspirin Other (comments)     \"messes my stomach up\"    Bactrim [Sulfamethoxazole-Trimethoprim] Unknown (comments)    Nsaids (Non-Steroidal Anti-Inflammatory Drug) Unknown (comments)       Review of Systems      Review of Systems   Constitutional:  Negative for chills and fever. HENT:  Negative for rhinorrhea and sore throat. Eyes:  Negative for pain and visual disturbance. Respiratory:  Positive for chest tightness, shortness of breath and wheezing. Cardiovascular:  Negative for chest pain and palpitations. Gastrointestinal:  Negative for abdominal pain, diarrhea, nausea and vomiting. Musculoskeletal:  Negative for arthralgias and myalgias. Skin:  Negative for rash and wound. Neurological:  Negative for speech difficulty, light-headedness and headaches. Psychiatric/Behavioral:  Negative for agitation and confusion. All other systems reviewed and are negative.     Physical Exam     Vitals:    08/22/22 1058 08/22/22 1059 08/22/22 1100 08/22/22 1101   BP:    (!) 140/49   Pulse: 80 72 66 64   Resp: 23 22 19 18   Temp:       SpO2: 100% 100% 100% 100%   Weight:       Height:           Physical Exam  Vitals and nursing note reviewed. Constitutional:       General: He is not in acute distress. Appearance: Normal appearance. He is normal weight. He is not ill-appearing. HENT:      Head: Normocephalic and atraumatic. Nose: Nose normal. No rhinorrhea. Mouth/Throat:      Mouth: Mucous membranes are moist.      Pharynx: No oropharyngeal exudate or posterior oropharyngeal erythema. Eyes:      Extraocular Movements: Extraocular movements intact. Conjunctiva/sclera: Conjunctivae normal.      Pupils: Pupils are equal, round, and reactive to light. Cardiovascular:      Rate and Rhythm: Normal rate and regular rhythm. Heart sounds: No murmur heard. No friction rub. No gallop. Pulmonary:      Effort: Respiratory distress present. Breath sounds: Decreased air movement present. Examination of the right-upper field reveals wheezing and rales. Examination of the left-upper field reveals wheezing and rales. Examination of the right-middle field reveals wheezing and rales. Examination of the left-middle field reveals wheezing and rales. Examination of the right-lower field reveals wheezing and rales. Examination of the left-lower field reveals wheezing and rales. Decreased breath sounds, wheezing and rales present. No rhonchi. Abdominal:      General: Bowel sounds are normal.      Palpations: Abdomen is soft. Tenderness: There is no abdominal tenderness. There is no guarding or rebound. Musculoskeletal:         General: No swelling, tenderness or deformity. Normal range of motion. Cervical back: Normal range of motion and neck supple. No rigidity. Lymphadenopathy:      Cervical: No cervical adenopathy. Skin:     General: Skin is warm and dry. Findings: No rash. Neurological:      General: No focal deficit present. Mental Status: He is alert and oriented to person, place, and time.    Psychiatric:         Mood and Affect: Mood normal.         Behavior: Behavior normal. Diagnostic Study Results     Labs -  Recent Results (from the past 12 hour(s))   EKG, 12 LEAD, INITIAL    Collection Time: 08/22/22  8:54 AM   Result Value Ref Range    Ventricular Rate 91 BPM    Atrial Rate 91 BPM    P-R Interval 156 ms    QRS Duration 78 ms    Q-T Interval 358 ms    QTC Calculation (Bezet) 440 ms    Calculated P Axis 76 degrees    Calculated R Axis 22 degrees    Calculated T Axis 67 degrees    Diagnosis       Normal sinus rhythm  Normal ECG  When compared with ECG of 21-AUG-2022 07:02,  No significant change was found     METABOLIC PANEL, COMPREHENSIVE    Collection Time: 08/22/22  9:10 AM   Result Value Ref Range    Sodium 136 136 - 145 mmol/L    Potassium 4.4 3.5 - 5.5 mmol/L    Chloride 103 100 - 111 mmol/L    CO2 25 21 - 32 mmol/L    Anion gap 8 3.0 - 18 mmol/L    Glucose 116 (H) 74 - 99 mg/dL    BUN 32 (H) 7.0 - 18 MG/DL    Creatinine 1.47 (H) 0.6 - 1.3 MG/DL    BUN/Creatinine ratio 22 (H) 12 - 20      GFR est AA 56 (L) >60 ml/min/1.73m2    GFR est non-AA 46 (L) >60 ml/min/1.73m2    Calcium 8.4 (L) 8.5 - 10.1 MG/DL    Bilirubin, total 0.4 0.2 - 1.0 MG/DL    ALT (SGPT) 12 (L) 16 - 61 U/L    AST (SGOT) 10 10 - 38 U/L    Alk.  phosphatase 91 45 - 117 U/L    Protein, total 6.1 (L) 6.4 - 8.2 g/dL    Albumin 3.1 (L) 3.4 - 5.0 g/dL    Globulin 3.0 2.0 - 4.0 g/dL    A-G Ratio 1.0 0.8 - 1.7     CBC WITH AUTOMATED DIFF    Collection Time: 08/22/22  9:10 AM   Result Value Ref Range    WBC 10.0 4.6 - 13.2 K/uL    RBC 3.59 (L) 4.35 - 5.65 M/uL    HGB 9.9 (L) 13.0 - 16.0 g/dL    HCT 31.1 (L) 36.0 - 48.0 %    MCV 86.6 78.0 - 100.0 FL    MCH 27.6 24.0 - 34.0 PG    MCHC 31.8 31.0 - 37.0 g/dL    RDW 13.9 11.6 - 14.5 %    PLATELET 637 250 - 792 K/uL    MPV 9.1 (L) 9.2 - 11.8 FL    NRBC 0.0 0  WBC    ABSOLUTE NRBC 0.00 0.00 - 0.01 K/uL    NEUTROPHILS 80 (H) 40 - 73 %    LYMPHOCYTES 11 (L) 21 - 52 %    MONOCYTES 8 3 - 10 %    EOSINOPHILS 0 0 - 5 %    BASOPHILS 0 0 - 2 %    IMMATURE GRANULOCYTES 1 (H) 0.0 - 0.5 %    ABS. NEUTROPHILS 8.0 1.8 - 8.0 K/UL    ABS. LYMPHOCYTES 1.1 0.9 - 3.6 K/UL    ABS. MONOCYTES 0.8 0.05 - 1.2 K/UL    ABS. EOSINOPHILS 0.0 0.0 - 0.4 K/UL    ABS. BASOPHILS 0.0 0.0 - 0.1 K/UL    ABS. IMM. GRANS. 0.1 (H) 0.00 - 0.04 K/UL    DF AUTOMATED     MAGNESIUM    Collection Time: 08/22/22  9:10 AM   Result Value Ref Range    Magnesium 2.5 1.6 - 2.6 mg/dL   TROPONIN-HIGH SENSITIVITY    Collection Time: 08/22/22  9:10 AM   Result Value Ref Range    Troponin-High Sensitivity 6 0 - 78 ng/L   NT-PRO BNP    Collection Time: 08/22/22  9:10 AM   Result Value Ref Range    NT pro-BNP 1,049 0 - 1,800 PG/ML   LACTIC ACID    Collection Time: 08/22/22  9:10 AM   Result Value Ref Range    Lactic acid 1.4 0.4 - 2.0 MMOL/L   URINALYSIS W/ RFLX MICROSCOPIC    Collection Time: 08/22/22 12:00 PM   Result Value Ref Range    Color YELLOW      Appearance CLEAR      Specific gravity 1.016 1.005 - 1.030      pH (UA) 5.5 5.0 - 8.0      Protein Negative NEG mg/dL    Glucose Negative NEG mg/dL    Ketone Negative NEG mg/dL    Bilirubin Negative NEG      Blood Negative NEG      Urobilinogen 0.2 0.2 - 1.0 EU/dL    Nitrites Negative NEG      Leukocyte Esterase Negative NEG         Radiologic Studies -   XR CHEST PORT   Final Result      Right-sided volume loss with stable areas of pleural parenchymal scarring. CT Results  (Last 48 hours)                 08/21/22 1023  CTA CHEST W OR W WO CONT Final result    Impression:      Suboptimal opacification of the pulmonary arteries but with no gross filling   defect to suggest embolism. Other chronic changes as described above are stable. Narrative:  CTA CHEST       Indication: Shortness of breath, positive d-dimer       Comparison: CTA chest 5/13/2022. Technique: Axial imaging was obtained dynamically through the pulmonary arteries   using high-resolution CT angiographic protocol, without complications.   In   addition, coronal and sagittal reconstructed MIP arteriograms were performed, to   better evaluate the pulmonary vasculature, and to increase sensitivity for   detection of pulmonary emboli. One or more dose reduction techniques were used on this CT: automated exposure   control, adjustment of the mAs and/or kVp according to patient's size, and   iterative reconstruction techniques. The specific techniques utilized on this CT   exam have been documented in the patient's electronic medical record. Digital Imaging and Communications in Medicine (DICOM) format image data are   available to nonaffiliated external healthcare facilities or entities on a   secure, media free, reciprocally searchable basis with patient authorization for   at least a 12-month period after this study. CT angiogram quality parameters:   1. Overall exam quality - suboptimal   2. Adequacy of pulmonary enhancement-suboptimal    3. Adequacy of breath-hold-optimal: No respiratory artifact     4. There are mild beam hardening artifacts affecting scan quality.       ============       PULMONARY ARTERIES: There is poor pulmonary arterial opacification with poor   opacification of the pulmonary arteries beyond the main pulmonary arteries. However, no definite filling defect is seen to suggest emboli. AORTA:  The aorta is normal in caliber; no aneurysm or dissection, allowing for   optimal contrast opacification directed to the pulmonary arteries and in the   setting of moderate cardiac motion artifact. LUNGS: Clear without consolidation to suggest infiltrate. PLEURA: Pleural base calcified masses are again seen in the right lung. AIRWAY: Unremarkable       MEDIASTINUM: Normal heart size with coronary arterial calcifications. No   pericardial effusion. LYMPH NODES: No mediastinal, hilar or axillary lymphadenopathy. Small   mediastinal lymph nodes are likely reactive. UPPER ABDOMEN: Numerous hepatic calcifications noted.  Cyst in the upper pole right kidney. OTHER: Degenerative changes noted in the spine.       ============                 CXR Results  (Last 48 hours)                 08/22/22 1004  XR CHEST PORT Final result    Impression:      Right-sided volume loss with stable areas of pleural parenchymal scarring. Narrative:  EXAM: XR CHEST PORT       CLINICAL INDICATION/HISTORY: meets SIRS criteria   -Additional: None       COMPARISON: 8/21/2022       TECHNIQUE: Frontal view of the chest       _______________       FINDINGS:       HEART AND MEDIASTINUM: Normal cardiac size and mediastinal contours. LUNGS AND PLEURAL SPACES: Right-sided volume loss with stable areas of pleural   parenchymal scarring. No focal pneumonic consolidation, pneumothorax, or pleural   effusion. Calcified right pleural plaques. BONY THORAX AND SOFT TISSUES: No acute osseous abnormality       _______________           08/21/22 0731  XR CHEST PORT Final result    Impression:      Hyperinflated lungs most likely due to underlying emphysema. Interstitial   prominence likely represent chronic fibrosis. No new abnormality is seen. Narrative:  A portable AP radiograph of the chest was obtained at 0728 hours:   INDICATION:  Shortness of breath. COMPARISON:  Portable chest 7/21/2022. FINDINGS:        Heart and mediastinum: Unremarkable. Lungs and pleura: Pleural-based calcifications in the right hemithorax including   the teardrop shaped pleural-based mass appears stable. Lungs are hyperinflated   with diffuse interstitial prominence likely postinflammatory fibrosis without   consolidation, pleural effusion, or pneumothorax developing since the prior   study. Aorta: Unremarkable. Bones: Degenerative changes noted throughout the spine. Other: None.                    Medications given in the ED-  Medications   albuterol-ipratropium (DUO-NEB) 2.5 MG-0.5 MG/3 ML (3 mL Nebulization Given 8/22/22 1000)   methylPREDNISolone (PF) (Solu-MEDROL) injection 125 mg (125 mg IntraVENous Given 8/22/22 1044)   albuterol-ipratropium (DUO-NEB) 2.5 MG-0.5 MG/3 ML (3 mL Nebulization Given 8/22/22 1142)       Procedures     Procedures    ED Course     I Medardo Wood MD) am the first provider for this patient. I reviewed the vital signs, available nursing notes, past medical history, past surgical history, family history and social history. Records Reviewed: Nursing Notes and Old Medical Records    Cardiac Monitor:  Rate: 91 bpm  Rhythm: sinus rhythm    Pulse Oximetry Analysis - 100% on 2 L/min by nasal cannula    EKG interpretation: (Preliminary)  Rhythm: NSR. Rate: 91 bpm; no STEMI  EKG read by Medardo Wood MD at 8:54 AM    8:59 AM Initial assessment performed. The patients presenting problems have been discussed, and they are in agreement with the care plan formulated and outlined with them. I have encouraged them to ask questions as they arise throughout their visit. SEPSIS ASSESSMENT NOTE:   9:12 AM  Medardo Wood MD, has seen and assessed the patient as follows:    Capillary Refill:normal/brisk  Cardiopulmonary Evaluation:   Lung Sounds: crackles, wheezing   Cardiac Sounds: regular rate, regular rhythm  Peripheral Pulses: present  Skin Exam: pink, warm, turgor good    Visit Vitals  BP (!) 140/49   Pulse 64   Temp 98.1 °F (36.7 °C)   Resp 18   Ht 5' 8\" (1.727 m)   Wt 90.7 kg (200 lb)   SpO2 100%   BMI 30.41 kg/m²       9:13 AM Fluids WERE NOT started because of CHF/risk for fluid overload. ED Course as of 08/22/22 1718   Mon Aug 22, 2022   1105 Reevaluated patient. Improved air movement, decreased wheezing. Will give another nebulizer. [JM]   0922 Patient is adamant that he does not want to stay in the hospital. [JM]   [id number] Reevaluated. Lungs are clear. Will discharge.   Patient does not want to stay in the hospital. [JM]      ED Course User Index  [IKE] Marshall Sanders MD       Medical Decision Making     Provider Notes (Medical Decision Making):   DDX: COPD, CHF, URI, pneumonia, ACS, medication noncompliance    Discussion:  76 y.o. male with dyspnea for 2 days. Patient was seen and discharged yesterday. He states that he feels worse today. Although he did not meet criteria for sepsis on arrival to the ED, blood cultures and lactic acid were drawn. Rapid COVID test yesterday was negative. CTA of the chest was performed yesterday for elevated D-dimer and although suboptimal did not demonstrate any large filling defect in the main pulmonary arteries. Patient has a history of being noncompliant with home meds. He has normal oxygen saturations on his baseline 2 L nasal cannula oxygen. His lung exam improved with treatment in the ED. Patient wants to be discharged home. I encouraged him to follow-up with primary care doctor for home health.    iagnosis and Disposition     DISCHARGE NOTE:  1:18 PM   Yuridia Garcia  results have been reviewed with him. He has been counseled regarding his diagnosis, treatment, and plan. He verbally conveys understanding and agreement of the signs, symptoms, diagnosis, treatment and prognosis and additionally agrees to follow up as discussed. He also agrees with the care-plan and conveys that all of his questions have been answered. I have also provided discharge instructions for him that include: educational information regarding their diagnosis and treatment, and list of reasons why they would want to return to the ED prior to their follow-up appointment, should his condition change. He has been provided with education for proper emergency department utilization. CLINICAL IMPRESSION:    1. Chronic obstructive pulmonary disease with acute exacerbation (HCC)        PLAN:  1. D/C Home  2. Discharge Medication List as of 8/22/2022  1:12 PM        3.    Follow-up Information       Follow up With Specialties Details Why Contact Info    Melisa Hernández MD Family Medicine Schedule an appointment as soon as possible for a visit  As soon as possible, For follow up from Emergency Department visit. [address]      THE [facility] EMERGENCY DEPT Emergency Medicine  As needed; If symptoms worsen [address] [place] [id number]  [address] [place] [address] Kwame Shukla MD am the primary clinician of record. Dragon Disclaimer     Please note that this dictation was completed with P[employer] Card, the computer voice recognition software. Quite often unanticipated grammatical, syntax, homophones, and other interpretive errors are inadvertently transcribed by the computer software. Please disregard these errors. Please excuse any errors that have escaped final proofreading.     Kwame Shukla MD

## 2022-08-22 NOTE — ED TRIAGE NOTES
Patient arrived via EMS from home, c/o difficulty breathing. Patient was here last night for same reason. Patient is normally on 2L NC at home for COPD. Patient found using at home albuterol treatment.      Patient hx of alzheimer's, COPD, hard of hearing

## 2022-08-25 LAB
ATRIAL RATE: 91 BPM
CALCULATED P AXIS, ECG09: 76 DEGREES
CALCULATED R AXIS, ECG10: 22 DEGREES
CALCULATED T AXIS, ECG11: 67 DEGREES
DIAGNOSIS, 93000: NORMAL
P-R INTERVAL, ECG05: 156 MS
Q-T INTERVAL, ECG07: 358 MS
QRS DURATION, ECG06: 78 MS
QTC CALCULATION (BEZET), ECG08: 440 MS
VENTRICULAR RATE, ECG03: 91 BPM

## 2022-09-05 ENCOUNTER — HOSPITAL ENCOUNTER (EMERGENCY)
Age: 77
Discharge: HOME | End: 2022-09-06
Attending: STUDENT IN AN ORGANIZED HEALTH CARE EDUCATION/TRAINING PROGRAM
Payer: COMMERCIAL

## 2022-09-05 ENCOUNTER — APPOINTMENT (OUTPATIENT)
Dept: GENERAL RADIOLOGY | Age: 77
End: 2022-09-05
Attending: STUDENT IN AN ORGANIZED HEALTH CARE EDUCATION/TRAINING PROGRAM
Payer: COMMERCIAL

## 2022-09-05 DIAGNOSIS — R06.02 SOB (SHORTNESS OF BREATH): ICD-10-CM

## 2022-09-05 DIAGNOSIS — R05.9 COUGH: Primary | ICD-10-CM

## 2022-09-05 LAB
ALBUMIN SERPL-MCNC: 3 G/DL (ref 3.4–5)
ALBUMIN/GLOB SERPL: 1 {RATIO} (ref 0.8–1.7)
ALP SERPL-CCNC: 96 U/L (ref 45–117)
ALT SERPL-CCNC: 11 U/L (ref 16–61)
ANION GAP SERPL CALC-SCNC: 8 MMOL/L (ref 3–18)
APPEARANCE UR: CLEAR
AST SERPL-CCNC: 11 U/L (ref 10–38)
BASOPHILS # BLD: 0.1 K/UL (ref 0–0.1)
BASOPHILS NFR BLD: 0 % (ref 0–2)
BILIRUB SERPL-MCNC: 0.5 MG/DL (ref 0.2–1)
BILIRUB UR QL: NEGATIVE
BNP SERPL-MCNC: 188 PG/ML (ref 0–1800)
BUN SERPL-MCNC: 32 MG/DL (ref 7–18)
BUN/CREAT SERPL: 20 (ref 12–20)
CALCIUM SERPL-MCNC: 7.9 MG/DL (ref 8.5–10.1)
CHLORIDE SERPL-SCNC: 105 MMOL/L (ref 100–111)
CO2 SERPL-SCNC: 25 MMOL/L (ref 21–32)
COLOR UR: YELLOW
CREAT SERPL-MCNC: 1.6 MG/DL (ref 0.6–1.3)
DIFFERENTIAL METHOD BLD: ABNORMAL
EOSINOPHIL # BLD: 0.5 K/UL (ref 0–0.4)
EOSINOPHIL NFR BLD: 2 % (ref 0–5)
ERYTHROCYTE [DISTWIDTH] IN BLOOD BY AUTOMATED COUNT: 14.5 % (ref 11.6–14.5)
GLOBULIN SER CALC-MCNC: 2.9 G/DL (ref 2–4)
GLUCOSE SERPL-MCNC: 113 MG/DL (ref 74–99)
GLUCOSE UR STRIP.AUTO-MCNC: NEGATIVE MG/DL
HCT VFR BLD AUTO: 33.1 % (ref 36–48)
HGB BLD-MCNC: 10.6 G/DL (ref 13–16)
HGB UR QL STRIP: NEGATIVE
IMM GRANULOCYTES # BLD AUTO: 0.2 K/UL (ref 0–0.04)
IMM GRANULOCYTES NFR BLD AUTO: 1 % (ref 0–0.5)
KETONES UR QL STRIP.AUTO: NEGATIVE MG/DL
LACTATE BLD-SCNC: 1.19 MMOL/L (ref 0.4–2)
LEUKOCYTE ESTERASE UR QL STRIP.AUTO: NEGATIVE
LYMPHOCYTES # BLD: 0.7 K/UL (ref 0.9–3.6)
LYMPHOCYTES NFR BLD: 3 % (ref 21–52)
MCH RBC QN AUTO: 28.1 PG (ref 24–34)
MCHC RBC AUTO-ENTMCNC: 32 G/DL (ref 31–37)
MCV RBC AUTO: 87.8 FL (ref 78–100)
MONOCYTES # BLD: 1 K/UL (ref 0.05–1.2)
MONOCYTES NFR BLD: 4 % (ref 3–10)
NEUTS SEG # BLD: 20 K/UL (ref 1.8–8)
NEUTS SEG NFR BLD: 89 % (ref 40–73)
NITRITE UR QL STRIP.AUTO: NEGATIVE
NRBC # BLD: 0 K/UL (ref 0–0.01)
NRBC BLD-RTO: 0 PER 100 WBC
PH UR STRIP: 6 [PH] (ref 5–8)
PLATELET # BLD AUTO: 303 K/UL (ref 135–420)
PMV BLD AUTO: 9.2 FL (ref 9.2–11.8)
POTASSIUM SERPL-SCNC: 4.3 MMOL/L (ref 3.5–5.5)
PROT SERPL-MCNC: 5.9 G/DL (ref 6.4–8.2)
PROT UR STRIP-MCNC: NEGATIVE MG/DL
RBC # BLD AUTO: 3.77 M/UL (ref 4.35–5.65)
SODIUM SERPL-SCNC: 138 MMOL/L (ref 136–145)
SP GR UR REFRACTOMETRY: 1.01 (ref 1–1.03)
TROPONIN-HIGH SENSITIVITY: 6 NG/L (ref 0–78)
UROBILINOGEN UR QL STRIP.AUTO: 0.2 EU/DL (ref 0.2–1)
WBC # BLD AUTO: 22.4 K/UL (ref 4.6–13.2)

## 2022-09-05 PROCEDURE — 81003 URINALYSIS AUTO W/O SCOPE: CPT

## 2022-09-05 PROCEDURE — 87040 BLOOD CULTURE FOR BACTERIA: CPT

## 2022-09-05 PROCEDURE — 83605 ASSAY OF LACTIC ACID: CPT

## 2022-09-05 PROCEDURE — 93005 ELECTROCARDIOGRAM TRACING: CPT

## 2022-09-05 PROCEDURE — 99285 EMERGENCY DEPT VISIT HI MDM: CPT

## 2022-09-05 PROCEDURE — 87086 URINE CULTURE/COLONY COUNT: CPT

## 2022-09-05 PROCEDURE — 84484 ASSAY OF TROPONIN QUANT: CPT

## 2022-09-05 PROCEDURE — 85025 COMPLETE CBC W/AUTO DIFF WBC: CPT

## 2022-09-05 PROCEDURE — 74011000258 HC RX REV CODE- 258: Performed by: STUDENT IN AN ORGANIZED HEALTH CARE EDUCATION/TRAINING PROGRAM

## 2022-09-05 PROCEDURE — 94640 AIRWAY INHALATION TREATMENT: CPT

## 2022-09-05 PROCEDURE — 0202U NFCT DS 22 TRGT SARS-COV-2: CPT

## 2022-09-05 PROCEDURE — 96365 THER/PROPH/DIAG IV INF INIT: CPT

## 2022-09-05 PROCEDURE — 71045 X-RAY EXAM CHEST 1 VIEW: CPT

## 2022-09-05 PROCEDURE — 96366 THER/PROPH/DIAG IV INF ADDON: CPT

## 2022-09-05 PROCEDURE — 96375 TX/PRO/DX INJ NEW DRUG ADDON: CPT

## 2022-09-05 PROCEDURE — 80053 COMPREHEN METABOLIC PANEL: CPT

## 2022-09-05 PROCEDURE — 74011000250 HC RX REV CODE- 250: Performed by: STUDENT IN AN ORGANIZED HEALTH CARE EDUCATION/TRAINING PROGRAM

## 2022-09-05 PROCEDURE — 74011250636 HC RX REV CODE- 250/636: Performed by: STUDENT IN AN ORGANIZED HEALTH CARE EDUCATION/TRAINING PROGRAM

## 2022-09-05 PROCEDURE — 83880 ASSAY OF NATRIURETIC PEPTIDE: CPT

## 2022-09-05 RX ORDER — SODIUM CHLORIDE 0.9 % (FLUSH) 0.9 %
5-10 SYRINGE (ML) INJECTION AS NEEDED
Status: DISCONTINUED | OUTPATIENT
Start: 2022-09-05 | End: 2022-09-06 | Stop reason: HOSPADM

## 2022-09-05 RX ORDER — IPRATROPIUM BROMIDE AND ALBUTEROL SULFATE 2.5; .5 MG/3ML; MG/3ML
3 SOLUTION RESPIRATORY (INHALATION)
Status: COMPLETED | OUTPATIENT
Start: 2022-09-05 | End: 2022-09-05

## 2022-09-05 RX ADMIN — CEFTRIAXONE 2 G: 2 INJECTION, POWDER, FOR SOLUTION INTRAMUSCULAR; INTRAVENOUS at 22:15

## 2022-09-05 RX ADMIN — IPRATROPIUM BROMIDE AND ALBUTEROL SULFATE 3 ML: .5; 3 SOLUTION RESPIRATORY (INHALATION) at 21:58

## 2022-09-05 RX ADMIN — METHYLPREDNISOLONE SODIUM SUCCINATE 125 MG: 125 INJECTION, POWDER, FOR SOLUTION INTRAMUSCULAR; INTRAVENOUS at 21:10

## 2022-09-05 RX ADMIN — SODIUM CHLORIDE 1000 ML: 9 INJECTION, SOLUTION INTRAVENOUS at 22:18

## 2022-09-05 RX ADMIN — AZITHROMYCIN MONOHYDRATE 500 MG: 500 INJECTION, POWDER, LYOPHILIZED, FOR SOLUTION INTRAVENOUS at 22:27

## 2022-09-05 RX ADMIN — IPRATROPIUM BROMIDE AND ALBUTEROL SULFATE 3 ML: .5; 3 SOLUTION RESPIRATORY (INHALATION) at 21:10

## 2022-09-05 RX ADMIN — IPRATROPIUM BROMIDE AND ALBUTEROL SULFATE 3 ML: .5; 3 SOLUTION RESPIRATORY (INHALATION) at 21:32

## 2022-09-06 ENCOUNTER — APPOINTMENT (OUTPATIENT)
Dept: CT IMAGING | Age: 77
End: 2022-09-06
Attending: STUDENT IN AN ORGANIZED HEALTH CARE EDUCATION/TRAINING PROGRAM
Payer: COMMERCIAL

## 2022-09-06 VITALS
TEMPERATURE: 100 F | WEIGHT: 200 LBS | OXYGEN SATURATION: 98 % | HEART RATE: 85 BPM | DIASTOLIC BLOOD PRESSURE: 65 MMHG | SYSTOLIC BLOOD PRESSURE: 129 MMHG | BODY MASS INDEX: 30.31 KG/M2 | RESPIRATION RATE: 28 BRPM | HEIGHT: 68 IN

## 2022-09-06 LAB
ATRIAL RATE: 105 BPM
B PERT DNA SPEC QL NAA+PROBE: NOT DETECTED
BORDETELLA PARAPERTUSSIS PCR, BORPAR: NOT DETECTED
C PNEUM DNA SPEC QL NAA+PROBE: NOT DETECTED
CALCULATED P AXIS, ECG09: 58 DEGREES
CALCULATED R AXIS, ECG10: 8 DEGREES
CALCULATED T AXIS, ECG11: 71 DEGREES
DIAGNOSIS, 93000: NORMAL
FLUAV SUBTYP SPEC NAA+PROBE: NOT DETECTED
FLUBV RNA SPEC QL NAA+PROBE: NOT DETECTED
HADV DNA SPEC QL NAA+PROBE: NOT DETECTED
HCOV 229E RNA SPEC QL NAA+PROBE: NOT DETECTED
HCOV HKU1 RNA SPEC QL NAA+PROBE: NOT DETECTED
HCOV NL63 RNA SPEC QL NAA+PROBE: NOT DETECTED
HCOV OC43 RNA SPEC QL NAA+PROBE: NOT DETECTED
HMPV RNA SPEC QL NAA+PROBE: NOT DETECTED
HPIV1 RNA SPEC QL NAA+PROBE: NOT DETECTED
HPIV2 RNA SPEC QL NAA+PROBE: NOT DETECTED
HPIV3 RNA SPEC QL NAA+PROBE: NOT DETECTED
HPIV4 RNA SPEC QL NAA+PROBE: NOT DETECTED
M PNEUMO DNA SPEC QL NAA+PROBE: NOT DETECTED
P-R INTERVAL, ECG05: 158 MS
Q-T INTERVAL, ECG07: 340 MS
QRS DURATION, ECG06: 84 MS
QTC CALCULATION (BEZET), ECG08: 449 MS
RSV RNA SPEC QL NAA+PROBE: NOT DETECTED
RV+EV RNA SPEC QL NAA+PROBE: NOT DETECTED
SARS-COV-2 PCR, COVPCR: NOT DETECTED
VENTRICULAR RATE, ECG03: 105 BPM

## 2022-09-06 PROCEDURE — 96366 THER/PROPH/DIAG IV INF ADDON: CPT

## 2022-09-06 PROCEDURE — 74176 CT ABD & PELVIS W/O CONTRAST: CPT

## 2022-09-06 RX ORDER — BENZONATATE 100 MG/1
100 CAPSULE ORAL
Qty: 30 CAPSULE | Refills: 0 | Status: SHIPPED | OUTPATIENT
Start: 2022-09-06 | End: 2022-09-13

## 2022-09-06 RX ORDER — DOXYCYCLINE HYCLATE 100 MG
100 TABLET ORAL 2 TIMES DAILY
Qty: 14 TABLET | Refills: 0 | Status: SHIPPED | OUTPATIENT
Start: 2022-09-06 | End: 2022-09-13

## 2022-09-06 NOTE — ED NOTES
Spoke to Melody Kitchen to see if she would be able to get the wife of patient so we are able to get him home safely.

## 2022-09-06 NOTE — DISCHARGE INSTRUCTIONS
Your white blood cells were elevated here today but we did not find any source of infection. Please return to the ER with any new or worsening symptoms or any other concerns. Please return to the Emergency Department if you develop a fever, chills, cannot eat or drink due to nausea or vomiting, or if any of your symptoms worsen. Also, It is extremely important that you follow-up with a primary care physician and if you do not have one currently use the contact information provided to obtain an appointment. If none was provided please call the number on the back of your insurance card to locate a Primary care doctor. Many offices have \"cancellation lists\" that you can ask to be placed on; should a patient with an earlier appointment cancel you will be notified to take their place. Please return to the Emergency Room immediately if your symptoms worsen. Please carefully read all discharge instructions    [email or web address]    What are GoodRx coupons? GoodRx coupons will help you pay less than the cash price for your prescription. [employer] free to use and are accepted at virtually every [place] pharmacy. Your pharmacist will know how to enter the codes on the coupon to pull up the lowest discount available.

## 2022-09-06 NOTE — ED TRIAGE NOTES
Pt arrives to ed per ems coming from home reporting sob that has been going on all day. Pt does have copd and has been using his nebulizer all day. Pt is on 2 L NC at home.

## 2022-09-06 NOTE — ED PROVIDER NOTES
EMERGENCY DEPARTMENT HISTORY AND PHYSICAL EXAM    10:11 PM    Date: 9/5/2022  Patient Name: Zeny Roberts    History of Presenting Illness     Chief Complaint   Patient presents with    Shortness of Breath       History Provided By: Patient  Location/Duration/Severity/Modifying factors   HPI  Zeny Roberts is a 76 y.o. male with past medical history hypertension, hard of hearing, COPD on 2 L nasal cannula, CHF, recurrent pneumonia presenting for evaluation of shortness of breath and cough. He says that for the last couple of days he has been having worsening of his chronic shortness of breath. He has a cough productive of some yellowish colored sputum. He also reports feeling chills. He has not had any chest pain, abdominal pain nausea or vomiting. Patient is a difficult historian which limits my HPI. He is been using his albuterol at home, otherwise has not had any alleviating or aggravating factors. Patient called 911 for transport to the hospital, received a DuoNeb in route and felt slightly better. PCP: Faith Miller MD    Current Facility-Administered Medications   Medication Dose Route Frequency Provider Last Rate Last Admin    sodium chloride (NS) flush 5-10 mL  5-10 mL IntraVENous PRN Darlene Ward MD        cefTRIAXone (ROCEPHIN) 2 g in 0.9% sodium chloride (MBP/ADV) 50 mL MBP  2 g IntraVENous Q24H Darlene Ward MD   IV Completed at 09/05/22 2227    azithromycin (ZITHROMAX) 500 mg in 0.9% sodium chloride 250 mL (VIAL-MATE)  500 mg IntraVENous Q24H Darlene Ward  mL/hr at 09/05/22 2227 500 mg at 09/05/22 2227     Current Outpatient Medications   Medication Sig Dispense Refill    doxycycline (VIBRA-TABS) 100 mg tablet Take 1 Tablet by mouth two (2) times a day for 7 days. 14 Tablet 0    benzonatate (Tessalon Perles) 100 mg capsule Take 1 Capsule by mouth three (3) times daily as needed for Cough for up to 7 days.  30 Capsule 0    albuterol (PROVENTIL HFA, VENTOLIN HFA, PROAIR HFA) 90 mcg/actuation inhaler Take 2 Puffs by inhalation every four (4) hours as needed for Wheezing or Shortness of Breath. 1 Each 3    DAPTOMYCIN  mg by IntraVENous route daily. in 25mL NS      sodium chloride (Normal Saline Flush) 5-10 mL by IntraVENous route daily. flush IV catheter with 10ml before and after infusing each dose of medication as directed. heparin sodium,porcine (HEPARIN LOCK FLUSH IV) 3 mL by IntraVENous route daily. flush IV catheter with 3ml of heparin 10units/mL after the last dose of 0.9% sodium chloride flush, after each dose of medication or as directed. flush IV catheter every day if not in use. ferrous sulfate 325 mg (65 mg iron) tablet Take 1 Tablet by mouth two (2) times daily (with meals). 60 Tablet 3    apixaban (ELIQUIS) 5 mg tablet Take 1 Tablet by mouth two (2) times a day. 60 Tablet 2    tamsulosin (FLOMAX) 0.4 mg capsule Take 1 Capsule by mouth every evening. 30 Capsule 3    furosemide (Lasix) 20 mg tablet Take 1 Tablet by mouth daily. 30 Tablet 3    amLODIPine (NORVASC) 10 mg tablet Take 1 Tablet by mouth daily. 30 Tablet 3    albuterol-ipratropium (DUO-NEB) 2.5 mg-0.5 mg/3 ml nebu 3 mL by Nebulization route every four (4) hours as needed for Wheezing. 30 Nebule 3    pantoprazole (PROTONIX) 40 mg tablet Take 1 Tablet by mouth Before breakfast and dinner. 60 Tablet 0    OXYGEN-AIR DELIVERY SYSTEMS 2 L/min by Nasal route daily as needed for PRN Reason (Other) (SOB/wheezing). dextran 70/hypromellose (ARTIFICIAL TEARS, PF, OP) Administer 2 Drops to both eyes daily as needed for Other (dry eyes).          Past History     Past Medical History:  Past Medical History:   Diagnosis Date    BMI 30.0-30.9,adult 4/2/2022    Brain aneurysm     Brain aneurysm     Cancer [facility])     prostate    CHF (congestive heart failure) (HCC)     CKD (chronic kidney disease)     Closed nondisplaced supracondylar fracture of lower end of left femur without intracondylar extension with delayed healing     COPD (chronic obstructive pulmonary disease) ([facility])     Debility     History of home oxygen therapy     [place] (hard of hearing)     Hypertension     Nocturia     On home oxygen therapy     PAF (paroxysmal atrial fibrillation) ([facility])     Pneumonia     Prostate CA ([address]     Prostate neoplasm     Radiation effect        Past Surgical History:  Past Surgical History:   Procedure Laterality Date    HX HERNIA REPAIR      HX HIP ARTHROSCOPY  04/09/2021       Family History:  Family History   Problem Relation Age of Onset    Heart Disease Mother        Social History:  Social History     Tobacco Use    Smoking status: Former     Types: Cigarettes    Smokeless tobacco: Never   Substance Use Topics    Alcohol use: No    Drug use: Never       Allergies: Allergies   Allergen Reactions    Aspirin Other (comments)     \"messes my stomach up\"    Bactrim [Sulfamethoxazole-Trimethoprim] Unknown (comments)    Nsaids (Non-Steroidal Anti-Inflammatory Drug) Unknown (comments)       I reviewed and confirmed the above information with patient and updated as necessary. Review of Systems     Review of Systems   Constitutional:  Positive for chills. Negative for diaphoresis and fever. HENT:  Negative for ear pain and sore throat. Eyes:         No acute change in vision   Respiratory:  Positive for cough and shortness of breath. Cardiovascular:  Negative for chest pain and leg swelling. Gastrointestinal:  Negative for abdominal pain and vomiting. Genitourinary:  Negative for dysuria. Musculoskeletal:  Negative for neck pain. Skin:  Negative for wound. Neurological:  Negative for weakness and headaches. Physical Exam   Visit Vitals  BP (!) 112/50   Pulse 96   Temp 100 °F (37.8 °C)   Resp 26   Ht 5' 8\" (1.727 m)   Wt 90.7 kg (200 lb)   SpO2 95%   BMI 30.41 kg/m²       Physical Exam  Vitals and nursing note reviewed. Constitutional:       Appearance: Normal appearance. He is not ill-appearing.       Comments: Adult male lying in a hospital stretcher, no respiratory distress   HENT:      Mouth/Throat:      Mouth: Mucous membranes are moist.   Eyes:      Pupils: Pupils are equal, round, and reactive to light. Cardiovascular:      Rate and Rhythm: Normal rate and regular rhythm. Pulses: Normal pulses. Heart sounds: Normal heart sounds. Pulmonary:      Effort: Pulmonary effort is normal.      Breath sounds: Rhonchi present. No wheezing. Abdominal:      General: Abdomen is flat. Tenderness: There is no abdominal tenderness. Musculoskeletal:         General: No swelling. Normal range of motion. Cervical back: Normal range of motion. Skin:     General: Skin is warm. Neurological:      General: No focal deficit present. Mental Status: He is alert and oriented to person, place, and time. Diagnostic Study Results     Labs -  Recent Results (from the past 24 hour(s))   CBC WITH AUTOMATED DIFF    Collection Time: 09/05/22  9:05 PM   Result Value Ref Range    WBC 22.4 (H) 4.6 - 13.2 K/uL    RBC 3.77 (L) 4.35 - 5.65 M/uL    HGB 10.6 (L) 13.0 - 16.0 g/dL    HCT 33.1 (L) 36.0 - 48.0 %    MCV 87.8 78.0 - 100.0 FL    MCH 28.1 24.0 - 34.0 PG    MCHC 32.0 31.0 - 37.0 g/dL    RDW 14.5 11.6 - 14.5 %    PLATELET 154 920 - 151 K/uL    MPV 9.2 9.2 - 11.8 FL    NRBC 0.0 0  WBC    ABSOLUTE NRBC 0.00 0.00 - 0.01 K/uL    NEUTROPHILS 89 (H) 40 - 73 %    LYMPHOCYTES 3 (L) 21 - 52 %    MONOCYTES 4 3 - 10 %    EOSINOPHILS 2 0 - 5 %    BASOPHILS 0 0 - 2 %    IMMATURE GRANULOCYTES 1 (H) 0.0 - 0.5 %    ABS. NEUTROPHILS 20.0 (H) 1.8 - 8.0 K/UL    ABS. LYMPHOCYTES 0.7 (L) 0.9 - 3.6 K/UL    ABS. MONOCYTES 1.0 0.05 - 1.2 K/UL    ABS. EOSINOPHILS 0.5 (H) 0.0 - 0.4 K/UL    ABS. BASOPHILS 0.1 0.0 - 0.1 K/UL    ABS. IMM.  GRANS. 0.2 (H) 0.00 - 0.04 K/UL    DF AUTOMATED     METABOLIC PANEL, COMPREHENSIVE    Collection Time: 09/05/22  9:05 PM   Result Value Ref Range    Sodium 138 136 - 145 mmol/L    Potassium 4.3 3.5 - 5.5 mmol/L    Chloride 105 100 - 111 mmol/L    CO2 25 21 - 32 mmol/L    Anion gap 8 3.0 - 18 mmol/L    Glucose 113 (H) 74 - 99 mg/dL    BUN 32 (H) 7.0 - 18 MG/DL    Creatinine 1.60 (H) 0.6 - 1.3 MG/DL    BUN/Creatinine ratio 20 12 - 20      GFR est AA 51 (L) >60 ml/min/1.73m2    GFR est non-AA 42 (L) >60 ml/min/1.73m2    Calcium 7.9 (L) 8.5 - 10.1 MG/DL    Bilirubin, total 0.5 0.2 - 1.0 MG/DL    ALT (SGPT) 11 (L) 16 - 61 U/L    AST (SGOT) 11 10 - 38 U/L    Alk. phosphatase 96 45 - 117 U/L    Protein, total 5.9 (L) 6.4 - 8.2 g/dL    Albumin 3.0 (L) 3.4 - 5.0 g/dL    Globulin 2.9 2.0 - 4.0 g/dL    A-G Ratio 1.0 0.8 - 1.7     NT-PRO BNP    Collection Time: 09/05/22  9:05 PM   Result Value Ref Range    NT pro- 0 - 1,800 PG/ML   TROPONIN-HIGH SENSITIVITY    Collection Time: 09/05/22  9:05 PM   Result Value Ref Range    Troponin-High Sensitivity 6 0 - 78 ng/L   URINALYSIS W/ RFLX MICROSCOPIC    Collection Time: 09/05/22 10:10 PM   Result Value Ref Range    Color YELLOW      Appearance CLEAR      Specific gravity 1.008 1.005 - 1.030      pH (UA) 6.0 5.0 - 8.0      Protein Negative NEG mg/dL    Glucose Negative NEG mg/dL    Ketone Negative NEG mg/dL    Bilirubin Negative NEG      Blood Negative NEG      Urobilinogen 0.2 0.2 - 1.0 EU/dL    Nitrites Negative NEG      Leukocyte Esterase Negative NEG     POC LACTIC ACID    Collection Time: 09/05/22 10:18 PM   Result Value Ref Range    Lactic Acid (POC) 1.19 0.40 - 2.00 mmol/L         Radiologic Studies -   CT CHEST ABD PELV WO CONT   Final Result         1. No evidence of acute inflammatory process that would correlate with   leukocytosis. No evidence of abscess. 2. Chronic changes are stable. Chronic calcified pleural abnormalities in the   right hemithorax are unchanged. 3. Additional chronic findings are stable compared to previous. No other acute   abnormality on this noncontrast CT evaluation of the chest abdomen and pelvis.        XR CHEST PORT   Final Result 1. No significant interval change and no new or progressive findings. 2. Stable opacity at the right lung base likely reflects chronic pleural   collections which were better seen on comparison CT. Medical Decision Making   I am the first provider for this patient. I reviewed the vital signs, available nursing notes, past medical history, past surgical history, family history and social history. Vital Signs-Reviewed the patient's vital signs. EKG: Nondiagnostic for ischemia or arrhythmia    Records Reviewed: Nursing Notes and Old Medical Records (Time of Review: 10:11 PM)    Provider Notes (Medical Decision Making):   MDM  69-year-old male here for evaluation of cough and shortness of breath consider pneumonia, COPD, asthma, less likely ACS    ED Course: Progress Notes, Reevaluation, and Consults:  Patient has a low-grade temp of 37.8, tachycardic at 97, slight tachypneic and satting well on his home 2 L. He is coughing, not in respiratory distress  Lungs are rhonchorous, not a lot of significant wheezing    Will treat with nebs and steroids, obtain a septic work-up and treat with respiratory antibiotics. We will withhold 30 cc/kg of IV fluids due to his history of heart failure. Patient does have a leukocytosis of 22, however lactic acid is normal.  He is on steroids, so this could be demargination. X-ray negative for pneumonia, and his urine is clear, no clear source for infection by history  Will expand work-up to include CT of the chest abdomen pelvis    CT chest and pelvis negative for any acute process,    Discussed above findings including the leukocytosis with the patient, and recommended that he be admitted for an observation. He is refusing and wants to be discharged home. As we do not have a clear source for his leukocytosis and he is on steroids and think this is relatively reasonable.   We will send with a prescription for doxycycline due to his cough as well as some antitussives. Long discussion at the bedside via written paper due to his hearing difficulty to ensure that he understands that he should return to the emergency department if he worsens. Patient was discharged home in stable condition tolerating p.o. feeling well. Procedures    Diagnosis     Clinical Impression:   1. Cough    2. SOB (shortness of breath)        Disposition: Home    Follow-up Information       Follow up With Specialties Details Why Contact Info    Sally Funk MD Family Medicine Schedule an appointment as soon as possible for a visit   [address] [place] [address]  [phone]               Patient's Medications   Start Taking    BENZONATATE (TESSALON PERLES) 100 MG CAPSULE    Take 1 Capsule by mouth three (3) times daily as needed for Cough for up to 7 days. DOXYCYCLINE (VIBRA-TABS) 100 MG TABLET    Take 1 Tablet by mouth two (2) times a day for 7 days. Continue Taking    ALBUTEROL (PROVENTIL HFA, VENTOLIN HFA, PROAIR HFA) 90 MCG/ACTUATION INHALER    Take 2 Puffs by inhalation every four (4) hours as needed for Wheezing or Shortness of Breath. ALBUTEROL-IPRATROPIUM (DUO-NEB) 2.5 MG-0.5 MG/3 ML NEBU    3 mL by Nebulization route every four (4) hours as needed for Wheezing. AMLODIPINE (NORVASC) 10 MG TABLET    Take 1 Tablet by mouth daily. APIXABAN (ELIQUIS) 5 MG TABLET    Take 1 Tablet by mouth two (2) times a day. DAPTOMYCIN IV    450 mg by IntraVENous route daily. in 25mL NS    DEXTRAN 70/HYPROMELLOSE (ARTIFICIAL TEARS, PF, OP)    Administer 2 Drops to both eyes daily as needed for Other (dry eyes). FERROUS SULFATE 325 MG (65 MG IRON) TABLET    Take 1 Tablet by mouth two (2) times daily (with meals). FUROSEMIDE (LASIX) 20 MG TABLET    Take 1 Tablet by mouth daily. HEPARIN SODIUM,PORCINE (HEPARIN LOCK FLUSH IV)    3 mL by IntraVENous route daily.  flush IV catheter with 3ml of heparin 10units/mL after the last dose of 0.9% sodium chloride flush, after each dose of medication or as directed. flush IV catheter every day if not in use. OXYGEN-AIR DELIVERY SYSTEMS    2 L/min by Nasal route daily as needed for PRN Reason (Other) (SOB/wheezing). PANTOPRAZOLE (PROTONIX) 40 MG TABLET    Take 1 Tablet by mouth Before breakfast and dinner. SODIUM CHLORIDE (NORMAL SALINE FLUSH)    5-10 mL by IntraVENous route daily. flush IV catheter with 10ml before and after infusing each dose of medication as directed. TAMSULOSIN (FLOMAX) 0.4 MG CAPSULE    Take 1 Capsule by mouth every evening. These Medications have changed    No medications on file   Stop Taking    No medications on file       Marii Manuel MD   Emergency Medicine   September 6, 2022, 10:11 PM     This note is dictated utilizing Dragon voice recognition software. Unfortunately this leads to occasional typographical errors using the voice recognition. I apologize in advance if the situation occurs. If questions occur please do not hesitate to contact me directly.     Ana Gardner MD

## 2022-09-07 LAB
BACTERIA SPEC CULT: NORMAL
SERVICE CMNT-IMP: NORMAL

## 2022-09-11 LAB
BACTERIA SPEC CULT: NORMAL
SERVICE CMNT-IMP: NORMAL

## 2022-09-11 NOTE — PROGRESS NOTES
ABG'S WERE OBTAINED WITH PATIENT ON AC/PC DRIVING PRESSURE 91/GENNA 0.8/PEEP 5/50%/RATE 14. PATIENT WAS AGITATED. RR IN HIGH 20'S. PH WAS 7.252/PCO2 44.3/HCO3 19.1/PO2 101. CHANGED TO AC/VC+ 16//40%/PEEP 5. SEEMS TO TOLERATE BETTER. NURSING AWARE. no

## 2022-10-14 ENCOUNTER — HOSPITAL ENCOUNTER (EMERGENCY)
Age: 77
Discharge: HOME | End: 2022-10-14
Attending: EMERGENCY MEDICINE
Payer: COMMERCIAL

## 2022-10-14 ENCOUNTER — APPOINTMENT (OUTPATIENT)
Dept: GENERAL RADIOLOGY | Age: 77
End: 2022-10-14
Attending: EMERGENCY MEDICINE
Payer: COMMERCIAL

## 2022-10-14 VITALS
RESPIRATION RATE: 21 BRPM | DIASTOLIC BLOOD PRESSURE: 61 MMHG | BODY MASS INDEX: 30.41 KG/M2 | OXYGEN SATURATION: 98 % | WEIGHT: 200 LBS | HEART RATE: 80 BPM | TEMPERATURE: 98.5 F | SYSTOLIC BLOOD PRESSURE: 156 MMHG

## 2022-10-14 DIAGNOSIS — J44.1 COPD EXACERBATION (HCC): Primary | ICD-10-CM

## 2022-10-14 LAB
ALBUMIN SERPL-MCNC: 3.2 G/DL (ref 3.4–5)
ALBUMIN/GLOB SERPL: 0.9 {RATIO} (ref 0.8–1.7)
ALP SERPL-CCNC: 129 U/L (ref 45–117)
ALT SERPL-CCNC: 15 U/L (ref 16–61)
ANION GAP SERPL CALC-SCNC: 6 MMOL/L (ref 3–18)
AST SERPL-CCNC: 12 U/L (ref 10–38)
BASOPHILS # BLD: 0 K/UL (ref 0–0.1)
BASOPHILS NFR BLD: 0 % (ref 0–2)
BILIRUB SERPL-MCNC: 0.4 MG/DL (ref 0.2–1)
BUN SERPL-MCNC: 18 MG/DL (ref 7–18)
BUN/CREAT SERPL: 14 (ref 12–20)
CALCIUM SERPL-MCNC: 8.9 MG/DL (ref 8.5–10.1)
CHLORIDE SERPL-SCNC: 102 MMOL/L (ref 100–111)
CO2 SERPL-SCNC: 29 MMOL/L (ref 21–32)
CREAT SERPL-MCNC: 1.3 MG/DL (ref 0.6–1.3)
DIFFERENTIAL METHOD BLD: ABNORMAL
EOSINOPHIL # BLD: 3.6 K/UL (ref 0–0.4)
EOSINOPHIL NFR BLD: 28 % (ref 0–5)
ERYTHROCYTE [DISTWIDTH] IN BLOOD BY AUTOMATED COUNT: 14.6 % (ref 11.6–14.5)
GLOBULIN SER CALC-MCNC: 3.4 G/DL (ref 2–4)
GLUCOSE SERPL-MCNC: 97 MG/DL (ref 74–99)
HCT VFR BLD AUTO: 34 % (ref 36–48)
HGB BLD-MCNC: 11.1 G/DL (ref 13–16)
IMM GRANULOCYTES # BLD AUTO: 0 K/UL
IMM GRANULOCYTES NFR BLD AUTO: 0 %
LYMPHOCYTES # BLD: 1.2 K/UL (ref 0.9–3.6)
LYMPHOCYTES NFR BLD: 9 % (ref 21–52)
MCH RBC QN AUTO: 27.7 PG (ref 24–34)
MCHC RBC AUTO-ENTMCNC: 32.6 G/DL (ref 31–37)
MCV RBC AUTO: 84.8 FL (ref 78–100)
MONOCYTES # BLD: 0.4 K/UL (ref 0.05–1.2)
MONOCYTES NFR BLD: 3 % (ref 3–10)
NEUTS SEG # BLD: 7.8 K/UL (ref 1.8–8)
NEUTS SEG NFR BLD: 60 % (ref 40–73)
NRBC # BLD: 0 K/UL (ref 0–0.01)
NRBC BLD-RTO: 0 PER 100 WBC
PLATELET # BLD AUTO: 310 K/UL (ref 135–420)
PLATELET COMMENTS,PCOM: ABNORMAL
PMV BLD AUTO: 8.6 FL (ref 9.2–11.8)
POTASSIUM SERPL-SCNC: 4.1 MMOL/L (ref 3.5–5.5)
PROT SERPL-MCNC: 6.6 G/DL (ref 6.4–8.2)
RBC # BLD AUTO: 4.01 M/UL (ref 4.35–5.65)
RBC MORPH BLD: ABNORMAL
SODIUM SERPL-SCNC: 137 MMOL/L (ref 136–145)
TROPONIN-HIGH SENSITIVITY: 7 NG/L (ref 0–78)
WBC # BLD AUTO: 13 K/UL (ref 4.6–13.2)

## 2022-10-14 PROCEDURE — 71045 X-RAY EXAM CHEST 1 VIEW: CPT

## 2022-10-14 PROCEDURE — 84484 ASSAY OF TROPONIN QUANT: CPT

## 2022-10-14 PROCEDURE — 80053 COMPREHEN METABOLIC PANEL: CPT

## 2022-10-14 PROCEDURE — 93005 ELECTROCARDIOGRAM TRACING: CPT

## 2022-10-14 PROCEDURE — 99285 EMERGENCY DEPT VISIT HI MDM: CPT

## 2022-10-14 PROCEDURE — 74011636637 HC RX REV CODE- 636/637: Performed by: EMERGENCY MEDICINE

## 2022-10-14 PROCEDURE — 85025 COMPLETE CBC W/AUTO DIFF WBC: CPT

## 2022-10-14 PROCEDURE — A9270 NON-COVERED ITEM OR SERVICE: HCPCS | Performed by: EMERGENCY MEDICINE

## 2022-10-14 RX ORDER — IPRATROPIUM BROMIDE AND ALBUTEROL SULFATE 2.5; .5 MG/3ML; MG/3ML
3 SOLUTION RESPIRATORY (INHALATION)
Qty: 10 EACH | Refills: 0 | Status: SHIPPED | OUTPATIENT
Start: 2022-10-14

## 2022-10-14 RX ORDER — PREDNISONE 50 MG/1
50 TABLET ORAL DAILY
Qty: 4 TABLET | Refills: 0 | Status: SHIPPED | OUTPATIENT
Start: 2022-10-15 | End: 2022-10-19

## 2022-10-14 RX ADMIN — PREDNISONE 50 MG: 20 TABLET ORAL at 18:07

## 2022-10-14 NOTE — ED NOTES
This RN called the patients wife Shwetha Ahuja, who stated that her daughter will be here in 15 minutes to pick him up. The patient and the ED charge RN, Ambar, have been informed.

## 2022-10-14 NOTE — ED PROVIDER NOTES
EMERGENCY DEPARTMENT HISTORY AND PHYSICAL EXAM      Date: 10/14/2022  Patient Name: Lucy Fofana    History of Presenting Illness     Chief Complaint   Patient presents with    Shortness of Breath       History (Context): Lucy Fofana is a 76 y.o. male with past medical history of COPD, CHF, CKD who presents via with shortness of breath at home. Started earlier today. Having difficulty breathing while at rest, not improving with bronchodilator inhalers. No change in chronic dry cough, no fevers, chest pain. Treatments via EMS include 2 Duonebs. Symptoms improving. Ready to go home. Not taking any oral steroids currently. Unsure of any sick contacts. PCP: Samantha Mejia MD    Current Facility-Administered Medications   Medication Dose Route Frequency Provider Last Rate Last Admin    predniSONE (DELTASONE) tablet 50 mg  50 mg Oral NOW Lanice Pih, DO         Current Outpatient Medications   Medication Sig Dispense Refill    [START ON 10/15/2022] predniSONE (DELTASONE) 50 mg tablet Take 1 Tablet by mouth daily for 4 days. 4 Tablet 0    albuterol (PROVENTIL HFA, VENTOLIN HFA, PROAIR HFA) 90 mcg/actuation inhaler Take 2 Puffs by inhalation every four (4) hours as needed for Wheezing or Shortness of Breath. 1 Each 3    DAPTOMYCIN  mg by IntraVENous route daily. in 25mL NS      sodium chloride (Normal Saline Flush) 5-10 mL by IntraVENous route daily. flush IV catheter with 10ml before and after infusing each dose of medication as directed. heparin sodium,porcine (HEPARIN LOCK FLUSH IV) 3 mL by IntraVENous route daily. flush IV catheter with 3ml of heparin 10units/mL after the last dose of 0.9% sodium chloride flush, after each dose of medication or as directed. flush IV catheter every day if not in use. ferrous sulfate 325 mg (65 mg iron) tablet Take 1 Tablet by mouth two (2) times daily (with meals). 60 Tablet 3    apixaban (ELIQUIS) 5 mg tablet Take 1 Tablet by mouth two (2) times a day. 60 Tablet 2    tamsulosin (FLOMAX) 0.4 mg capsule Take 1 Capsule by mouth every evening. 30 Capsule 3    furosemide (Lasix) 20 mg tablet Take 1 Tablet by mouth daily. 30 Tablet 3    amLODIPine (NORVASC) 10 mg tablet Take 1 Tablet by mouth daily. 30 Tablet 3    albuterol-ipratropium (DUO-NEB) 2.5 mg-0.5 mg/3 ml nebu 3 mL by Nebulization route every four (4) hours as needed for Wheezing. 30 Nebule 3    pantoprazole (PROTONIX) 40 mg tablet Take 1 Tablet by mouth Before breakfast and dinner. 60 Tablet 0    OXYGEN-AIR DELIVERY SYSTEMS 2 L/min by Nasal route daily as needed for PRN Reason (Other) (SOB/wheezing). dextran 70/hypromellose (ARTIFICIAL TEARS, PF, OP) Administer 2 Drops to both eyes daily as needed for Other (dry eyes). Past History     Past Medical History:  Past Medical History:   Diagnosis Date    BMI 30.0-30.9,adult 4/2/2022    Brain aneurysm     Brain aneurysm     Cancer [facility])     prostate    CHF (congestive heart failure) (HCC)     CKD (chronic kidney disease)     Closed nondisplaced supracondylar fracture of lower end of left femur without intracondylar extension with delayed healing     COPD (chronic obstructive pulmonary disease) ([address]     Debility     History of home oxygen therapy     [place] (hard of hearing)     Hypertension     Nocturia     On home oxygen therapy     PAF (paroxysmal atrial fibrillation) (HCC)     Pneumonia     Prostate CA ([address]     Prostate neoplasm     Radiation effect        Past Surgical History:  Past Surgical History:   Procedure Laterality Date    HX HERNIA REPAIR      HX HIP ARTHROSCOPY  04/09/2021       Family History:  Family History   Problem Relation Age of Onset    Heart Disease Mother        Social History:  Social History     Tobacco Use    Smoking status: Former     Types: Cigarettes    Smokeless tobacco: Never   Substance Use Topics    Alcohol use: No    Drug use: Never       Allergies:   Allergies   Allergen Reactions    Aspirin Other (comments) \"messes my stomach up\"    Bactrim [Sulfamethoxazole-Trimethoprim] Unknown (comments)    Nsaids (Non-Steroidal Anti-Inflammatory Drug) Unknown (comments)       PMH, PSH, family history, social history, allergies reviewed with the patient with significant items noted above. Review of Systems   Gen: Denies fever, chills, fatigue. HEENT: Positive for chronic hearing difficulty. Denies congestion, sore throat. Cardiac: Denies chest pain. Pulm: Positive for cough, shortness of breath  GI: Denies abdominal pain, nausea, vomiting, recent change in stools  : Denies change in urine stream.  Neuro: Denies headache, dizziness, LOC. Ext: Denies peripheral edema, focal extremity pain  Skin: Denies rash. Physical Exam     Vitals:    10/14/22 1534 10/14/22 1603 10/14/22 1702 10/14/22 1725   BP: (!) 155/78 (!) 121/55 (!) 120/55    Pulse: 87 73 69    Resp: 22 17 16    Temp: 98.5 °F (36.9 °C)      SpO2: 98% 98% 100% 98%   Weight: 90.7 kg (200 lb)          Gen: Well-appearing, in no acute distress   HEENT: Normocephalic, sclera anicteric. Cardiovascular: Normal rate, regular rhythm, no murmurs, rubs, gallops. Pulses intact and equal distally. Pulmonary: In no respiratory distress. Lungs sounds slightly diminished but overall clear to auscultation, no expiratory wheezes auscultated. No muscle use. Neuro: Alert. Normal speech. Normal mentation. Psych: Normal thought content and thought processes. EXT: Moves all extremities well. No edema or cyanosis. Skin: Warm and well-perfused. No rash.         Diagnostic Study Results     Labs -     Recent Results (from the past 12 hour(s))   EKG, 12 LEAD, INITIAL    Collection Time: 10/14/22  3:38 PM   Result Value Ref Range    Ventricular Rate 81 BPM    Atrial Rate 81 BPM    P-R Interval 166 ms    QRS Duration 84 ms    Q-T Interval 378 ms    QTC Calculation (Bezet) 439 ms    Calculated P Axis 60 degrees    Calculated R Axis -7 degrees    Calculated T Axis 54 degrees Diagnosis       Normal sinus rhythm  Normal ECG  When compared with ECG of 05-SEP-2022 20:43,  No significant change was found     CBC WITH AUTOMATED DIFF    Collection Time: 10/14/22  3:44 PM   Result Value Ref Range    WBC 13.0 4.6 - 13.2 K/uL    RBC 4.01 (L) 4.35 - 5.65 M/uL    HGB 11.1 (L) 13.0 - 16.0 g/dL    HCT 34.0 (L) 36.0 - 48.0 %    MCV 84.8 78.0 - 100.0 FL    MCH 27.7 24.0 - 34.0 PG    MCHC 32.6 31.0 - 37.0 g/dL    RDW 14.6 (H) 11.6 - 14.5 %    PLATELET 813 280 - 844 K/uL    MPV 8.6 (L) 9.2 - 11.8 FL    NRBC 0.0 0  WBC    ABSOLUTE NRBC 0.00 0.00 - 0.01 K/uL    NEUTROPHILS 60 40 - 73 %    LYMPHOCYTES 9 (L) 21 - 52 %    MONOCYTES 3 3 - 10 %    EOSINOPHILS 28 (H) 0 - 5 %    BASOPHILS 0 0 - 2 %    IMMATURE GRANULOCYTES 0 %    ABS. NEUTROPHILS 7.8 1.8 - 8.0 K/UL    ABS. LYMPHOCYTES 1.2 0.9 - 3.6 K/UL    ABS. MONOCYTES 0.4 0.05 - 1.2 K/UL    ABS. EOSINOPHILS 3.6 (H) 0.0 - 0.4 K/UL    ABS. BASOPHILS 0.0 0.0 - 0.1 K/UL    ABS. IMM. GRANS. 0.0 K/UL    DF AUTOMATED      PLATELET COMMENTS ADEQUATE PLATELETS      RBC COMMENTS NORMOCYTIC, NORMOCHROMIC     METABOLIC PANEL, COMPREHENSIVE    Collection Time: 10/14/22  3:44 PM   Result Value Ref Range    Sodium 137 136 - 145 mmol/L    Potassium 4.1 3.5 - 5.5 mmol/L    Chloride 102 100 - 111 mmol/L    CO2 29 21 - 32 mmol/L    Anion gap 6 3.0 - 18 mmol/L    Glucose 97 74 - 99 mg/dL    BUN 18 7.0 - 18 MG/DL    Creatinine 1.30 0.6 - 1.3 MG/DL    BUN/Creatinine ratio 14 12 - 20      eGFR 56 (L) >60 ml/min/1.73m2    Calcium 8.9 8.5 - 10.1 MG/DL    Bilirubin, total 0.4 0.2 - 1.0 MG/DL    ALT (SGPT) 15 (L) 16 - 61 U/L    AST (SGOT) 12 10 - 38 U/L    Alk.  phosphatase 129 (H) 45 - 117 U/L    Protein, total 6.6 6.4 - 8.2 g/dL    Albumin 3.2 (L) 3.4 - 5.0 g/dL    Globulin 3.4 2.0 - 4.0 g/dL    A-G Ratio 0.9 0.8 - 1.7     TROPONIN-HIGH SENSITIVITY    Collection Time: 10/14/22  3:44 PM   Result Value Ref Range    Troponin-High Sensitivity 7 0 - 78 ng/L       Radiologic Studies - XR CHEST PORT   Final Result      No active cardiopulmonary disease. CT Results  (Last 48 hours)      None          CXR Results  (Last 48 hours)                 10/14/22 1635  XR CHEST PORT Final result    Impression:      No active cardiopulmonary disease. Narrative:  EXAM: CHEST RADIOGRAPH       CLINICAL INDICATION/HISTORY: Shortness of breath   -Additional: None       COMPARISON: September 5, 2022       TECHNIQUE: Portable frontal view of the chest       _______________       FINDINGS:       SUPPORT DEVICES: None. HEART AND MEDIASTINUM: No appreciable cardiomegaly. Remaining mediastinal   contours within normal limits. LUNGS AND PLEURAL SPACES: There is volume loss in the right lung as well as   chronic scarring. No consolidation, pleural effusion, or pneumothorax. BONY THORAX AND SOFT TISSUES: Unremarkable.       _______________                     Medical Decision Making   I am the first provider for this patient. I reviewed the vital signs, available nursing notes, past medical history, past surgical history, family history and social history. Vital Signs-Reviewed the patient's vital signs. EKG: Interpreted by myself. Normal sinus rhythm, no ST segment elevations or depressions, no Q waves, no bundle branch block pattern, normal QRS and QTc intervals     Records Reviewed: Personally, on initial evaluation    MDM:   Patient's clinical presentation most consistent with COPD exacerbation, mild without hypoxia, unclear etiology. Improving with bronchodilator therapy given by EMS. Give first dose of prednisone 50 mg in ED. chest x-ray shows persistent right lower lobe infiltrate, appearance appears identical to previous chest x-ray 2 months ago. Patient not hypoxic or in any respiratory distress. No further duonebulizer treatments indicated in the ED. Plan to treat with 4 more days of oral prednisone 50 mg/day.   No indication for azithromycin given no change in sputum production. Other DDX considered but deemed less likely based on history, exam and objective studies include Acute Myocardial Infarction, Pulmonary Embolism, Acute Decompensated Heart Failure, Pneumonia, Hemo-/Pneumothorax, Pleural Effusion, Symptomatic Anemia    Patient condition at time of disposition: Stable, improved    DISCHARGE NOTE:   Pt has been reexamined. Patient has no new complaints, changes, or physical findings. Care plan outlined and precautions discussed. Results were reviewed with the patient. All medications were reviewed with the patient. All of pt's questions and concerns were addressed. Alarm symptoms and return precautions associated with chief complaint and evaluation were reviewed with the patient in detail. The patient demonstrated adequate understanding. Patient was instructed and agrees to follow up with primary care provider, pulmonology as well as to return to the ED upon further deterioration. Patient is ready to go home. Follow-up Information       Follow up With Specialties Details Why Contact Info    Pbalo Lyon MD Family Medicine In 3 days  [address] [address]  [phone]      THE [facility] EMERGENCY DEPT Emergency Medicine  If symptoms worsen, persistent shortness of breath at rest or with exertion, not improving with up to 3 bronchodilator treatments within 1 hour (20 minutes apart) [address] [place]  [phone]            Current Discharge Medication List        START taking these medications    Details   predniSONE (DELTASONE) 50 mg tablet Take 1 Tablet by mouth daily for 4 days. Qty: 4 Tablet, Refills: 0  Start date: 10/15/2022, End date: 10/19/2022             Procedures:  Procedures          Diagnosis     Clinical Impression:   1. COPD exacerbation ([facility]        Signed,  Jean Campuzano D.O.   Emergency Physician      As a voice dictation software was utilized to dictate this note, minor word transpositions can occur. I apologize for confusing wording and typographic errors. Please feel free to contact me for clarification.

## 2022-10-19 LAB
ATRIAL RATE: 81 BPM
CALCULATED P AXIS, ECG09: 60 DEGREES
CALCULATED R AXIS, ECG10: -7 DEGREES
CALCULATED T AXIS, ECG11: 54 DEGREES
DIAGNOSIS, 93000: NORMAL
P-R INTERVAL, ECG05: 166 MS
Q-T INTERVAL, ECG07: 378 MS
QRS DURATION, ECG06: 84 MS
QTC CALCULATION (BEZET), ECG08: 439 MS
VENTRICULAR RATE, ECG03: 81 BPM

## 2022-11-28 ENCOUNTER — APPOINTMENT (OUTPATIENT)
Dept: GENERAL RADIOLOGY | Age: 77
End: 2022-11-28
Attending: PHYSICIAN ASSISTANT
Payer: COMMERCIAL

## 2022-11-28 ENCOUNTER — HOSPITAL ENCOUNTER (EMERGENCY)
Age: 77
Discharge: HOME | End: 2022-11-28
Attending: EMERGENCY MEDICINE
Payer: COMMERCIAL

## 2022-11-28 VITALS
SYSTOLIC BLOOD PRESSURE: 131 MMHG | HEART RATE: 96 BPM | TEMPERATURE: 98.8 F | OXYGEN SATURATION: 97 % | DIASTOLIC BLOOD PRESSURE: 57 MMHG | RESPIRATION RATE: 18 BRPM

## 2022-11-28 DIAGNOSIS — J44.1 ACUTE EXACERBATION OF CHRONIC OBSTRUCTIVE PULMONARY DISEASE (COPD) (HCC): Primary | ICD-10-CM

## 2022-11-28 LAB
ALBUMIN SERPL-MCNC: 2.7 G/DL (ref 3.4–5)
ALBUMIN/GLOB SERPL: 0.8 {RATIO} (ref 0.8–1.7)
ALP SERPL-CCNC: 106 U/L (ref 45–117)
ALT SERPL-CCNC: 10 U/L (ref 16–61)
ANION GAP SERPL CALC-SCNC: 6 MMOL/L (ref 3–18)
AST SERPL-CCNC: 12 U/L (ref 10–38)
BASOPHILS # BLD: 0 K/UL (ref 0–0.1)
BASOPHILS NFR BLD: 0 % (ref 0–2)
BILIRUB SERPL-MCNC: 0.3 MG/DL (ref 0.2–1)
BUN SERPL-MCNC: 26 MG/DL (ref 7–18)
BUN/CREAT SERPL: 19 (ref 12–20)
CALCIUM SERPL-MCNC: 8.2 MG/DL (ref 8.5–10.1)
CHLORIDE SERPL-SCNC: 106 MMOL/L (ref 100–111)
CO2 SERPL-SCNC: 26 MMOL/L (ref 21–32)
CREAT SERPL-MCNC: 1.35 MG/DL (ref 0.6–1.3)
DIFFERENTIAL METHOD BLD: ABNORMAL
EOSINOPHIL # BLD: 3.8 K/UL (ref 0–0.4)
EOSINOPHIL NFR BLD: 33 % (ref 0–5)
ERYTHROCYTE [DISTWIDTH] IN BLOOD BY AUTOMATED COUNT: 14.3 % (ref 11.6–14.5)
GLOBULIN SER CALC-MCNC: 3.4 G/DL (ref 2–4)
GLUCOSE SERPL-MCNC: 93 MG/DL (ref 74–99)
HCT VFR BLD AUTO: 30.2 % (ref 36–48)
HGB BLD-MCNC: 9.9 G/DL (ref 13–16)
IMM GRANULOCYTES # BLD AUTO: 0 K/UL
IMM GRANULOCYTES NFR BLD AUTO: 0 %
LYMPHOCYTES # BLD: 1 K/UL (ref 0.9–3.6)
LYMPHOCYTES NFR BLD: 9 % (ref 21–52)
MCH RBC QN AUTO: 28 PG (ref 24–34)
MCHC RBC AUTO-ENTMCNC: 32.8 G/DL (ref 31–37)
MCV RBC AUTO: 85.6 FL (ref 78–100)
MONOCYTES # BLD: 0.3 K/UL (ref 0.05–1.2)
MONOCYTES NFR BLD: 3 % (ref 3–10)
NEUTS SEG # BLD: 6.4 K/UL (ref 1.8–8)
NEUTS SEG NFR BLD: 55 % (ref 40–73)
NRBC # BLD: 0 K/UL (ref 0–0.01)
NRBC BLD-RTO: 0 PER 100 WBC
PLATELET # BLD AUTO: 358 K/UL (ref 135–420)
PLATELET COMMENTS,PCOM: ABNORMAL
PMV BLD AUTO: 8.6 FL (ref 9.2–11.8)
POTASSIUM SERPL-SCNC: 4.6 MMOL/L (ref 3.5–5.5)
PROT SERPL-MCNC: 6.1 G/DL (ref 6.4–8.2)
RBC # BLD AUTO: 3.53 M/UL (ref 4.35–5.65)
RBC MORPH BLD: ABNORMAL
RBC MORPH BLD: ABNORMAL
SODIUM SERPL-SCNC: 138 MMOL/L (ref 136–145)
WBC # BLD AUTO: 11.5 K/UL (ref 4.6–13.2)
WBC MORPH BLD: ABNORMAL

## 2022-11-28 PROCEDURE — 71045 X-RAY EXAM CHEST 1 VIEW: CPT

## 2022-11-28 PROCEDURE — 74011250637 HC RX REV CODE- 250/637: Performed by: EMERGENCY MEDICINE

## 2022-11-28 PROCEDURE — 85025 COMPLETE CBC W/AUTO DIFF WBC: CPT

## 2022-11-28 PROCEDURE — 80053 COMPREHEN METABOLIC PANEL: CPT

## 2022-11-28 PROCEDURE — 74011000250 HC RX REV CODE- 250: Performed by: EMERGENCY MEDICINE

## 2022-11-28 PROCEDURE — 99284 EMERGENCY DEPT VISIT MOD MDM: CPT

## 2022-11-28 RX ORDER — ALBUTEROL SULFATE 0.83 MG/ML
2.5 SOLUTION RESPIRATORY (INHALATION)
Qty: 30 EACH | Refills: 1 | Status: SHIPPED | OUTPATIENT
Start: 2022-11-28

## 2022-11-28 RX ORDER — DOXYCYCLINE 100 MG/1
100 CAPSULE ORAL
Status: COMPLETED | OUTPATIENT
Start: 2022-11-28 | End: 2022-11-28

## 2022-11-28 RX ORDER — IPRATROPIUM BROMIDE AND ALBUTEROL SULFATE 2.5; .5 MG/3ML; MG/3ML
3 SOLUTION RESPIRATORY (INHALATION)
Status: COMPLETED | OUTPATIENT
Start: 2022-11-28 | End: 2022-11-28

## 2022-11-28 RX ORDER — PREDNISONE 20 MG/1
TABLET ORAL
Qty: 19.5 TABLET | Refills: 0 | Status: SHIPPED | OUTPATIENT
Start: 2022-11-28 | End: 2022-12-10

## 2022-11-28 RX ORDER — DOXYCYCLINE HYCLATE 100 MG
100 TABLET ORAL 2 TIMES DAILY
Qty: 14 TABLET | Refills: 0 | Status: SHIPPED | OUTPATIENT
Start: 2022-11-28 | End: 2022-12-05

## 2022-11-28 RX ADMIN — DOXYCYCLINE 100 MG: 100 CAPSULE ORAL at 19:39

## 2022-11-28 RX ADMIN — IPRATROPIUM BROMIDE AND ALBUTEROL SULFATE 3 ML: .5; 3 SOLUTION RESPIRATORY (INHALATION) at 19:39

## 2022-11-28 NOTE — ED TRIAGE NOTES
Patient was hx of COPD states has had increase wheezing and SOB. Has been using nebs at home no relief .  EMS gave 1 A&A and 125 solumedrol in transport

## 2022-11-29 NOTE — DISCHARGE INSTRUCTIONS
Thank you for allowing us to provide you with excellent care today. We hope we addressed all of your concerns and needs. We strive to provide excellent quality care in the Emergency Department. Anything less than excellent does not meet our expectations for you. Incidental findings are findings on labs or imaging studies that do not necessarily correlate with the reason for your visit. We make every effort to inform you of these incidental findings and the proper follow-up, however it is important that you call your primary care doctor or a primary care doctor in 1 to 2 days to set up a follow-up visit so they can go through your results in detail with you, and determine if additional testing is needed. The emergency room is not intended to be complete or comprehensive care, and it serves as a means to rule out life-threatening pathologies. It is very important that you follow-up with your primary care doctor to arrange additional testing and/or treatment if needed. The exam and treatment you received in the Emergency Department were for an urgent problem and are not intended as complete care. It is important that you follow-up with a doctor, nurse practitioner, or physician assistant to:  (1) confirm your diagnosis,  (2) re-evaluation of changes in your illness and treatment, and  (3) for ongoing care. If your symptoms become worse or you do not improve as expected, or you develop any new symptoms that concern you and/or you are unable to reach your usual health care provider, you should return to the Emergency Department. We are available 24 hours a day. If your blood pressure is greater than 120/80, your blood pressure is considered elevated above normal and you need to follow up with your primary care physician or the physician provided on your discharge instructions for a blood pressure recheck.      If you were prescribed narcotic medications such as hydrocodone, oxycodone, diazepam, lorazepam, alprazolam, tramadol or codeine, these medications will NOT typically be refilled in the Emergency Room. Follow up with your primary care doctor or specialist to discuss this, or you may return to the Emergency room if your condition worsens. XR CHEST PORT   Final Result      No significant change or acute pulmonary process identified. Diagnosis:   1. Acute exacerbation of chronic obstructive pulmonary disease (COPD) ([facility]          Take this sheet with you when you go to your follow-up visit. If you have any problem arranging the follow-up visit, contact [phone] ([phone])    Make an appointment with your Primary Care doctor for follow up of this visit. Return to the ER if you are unable to be seen in the time recommended on your discharge instructions. It has been a pleasure caring for you today. Return to the ER or seek medical care for any worsening in your condition at any time. ServiceMesh Activation    Thank you for requesting access to Service[employer]. Please follow the instructions below to securely access and download your online medical record. ServiceMesh allows you to send messages to your doctor, view your test results, renew your prescriptions, schedule appointments, and more. How Do I Sign Up? In your internet browser, go to [email or web address]  Click on the First Time User? Click Here link in the Sign In box. You will be redirect to the New Member Sign Up page. Enter your ServiceMesh Access Code exactly as it appears below. You will not need to use this code after youve completed the sign-up process. If you do not sign up before the expiration date, you must request a new code. ServiceMesh Access Code: Activation code not generated  Current ServiceMesh Status: Active (This is the date your ServiceMesh access code will )    Enter the last four digits of your Social Security Number (xxxx) and Date of Birth (mm/dd/yyyy) as indicated and click Submit.  You will be taken to the next sign-up page. Create a Consertt ID. This will be your Sosedi login ID and cannot be changed, so think of one that is secure and easy to remember. Create a Sosedi password. You can change your password at any time. Enter your Password Reset Question and Answer. This can be used at a later time if you forget your password. Enter your e-mail address. You will receive e-mail notification when new information is available in [address] Click Sign Up. You can now view and download portions of your medical record. Click the Momo link to download a portable copy of your medical information. Additional Information    If you have questions, please visit the Frequently Asked Questions section of the S[employer]i website at [email or web address] Remember, Sosedi is NOT to be used for urgent needs. For medical emergencies, dial 911.

## 2022-11-29 NOTE — ROUTINE PROCESS
EMR entered and reviewed by Professional  for the purpose of chart review in the course of performing educational functions and responsibilities related to performance improvement. No

## 2022-11-29 NOTE — ED PROVIDER NOTES
EMERGENCY DEPARTMENT HISTORY AND PHYSICAL EXAM      Date: 11/28/2022  Patient Name: Oswaldo Villanueva      History of Presenting Illness     Chief Complaint   Patient presents with    Wheezing       History Provided By: Patient  Location/Duration/Severity/Modifying factors   Patient is a 70-year-old gentleman who is well-known to our ER with a history of COPD presenting with cough, wheezing and shortness of breath. He states his symptoms started for about a day. Denies any fevers or chills, no chest pain or shortness of breath. Has had increased cough. Per EMS account, patient received a DuoNeb treatment in route as well as intravenous Solu-Medrol and he states he feels better and wants to go home. He states he feels consistent with prior COPD exacerbations. He states he uses a nebulizer machine at home. Nothing seems to make his symptoms better or worse. Wheezing   Associated symptoms include cough. Pertinent negatives include no chest pain, no fever, no abdominal pain, no vomiting, no diarrhea, no headaches, no rhinorrhea and no rash. There are no other complaints, changes, or physical findings at this time. PCP: Jez Blackman MD    Current Facility-Administered Medications   Medication Dose Route Frequency Provider Last Rate Last Admin    albuterol-ipratropium (DUO-NEB) 2.5 MG-0.5 MG/3 ML  3 mL Nebulization NOW Bulmaro Nipper, DO        doxycycline (MONODOX) capsule 100 mg  100 mg Oral NOW [place] Nipper, DO         Current Outpatient Medications   Medication Sig Dispense Refill    albuterol (PROVENTIL VENTOLIN) 2.5 mg /3 mL (0.083 %) nebu 3 mL by Nebulization route every four (4) hours as needed for Wheezing, Shortness of Breath or Cough. 30 Each 1    predniSONE (DELTASONE) 20 mg tablet Take 3 Tablets by mouth daily (with breakfast) for 3 days, THEN 2 Tablets daily (with breakfast) for 3 days, THEN 1 Tablet daily (with breakfast) for 3 days, THEN 0.5 Tablets daily (with breakfast) for 3 days. 19.5 Tablet 0    doxycycline (VIBRA-TABS) 100 mg tablet Take 1 Tablet by mouth two (2) times a day for 7 days. 14 Tablet 0    albuterol-ipratropium (DUO-NEB) 2.5 mg-0.5 mg/3 ml nebu 3 mL by Nebulization route every four (4) hours as needed for Wheezing. 10 Each 0    albuterol (PROVENTIL HFA, VENTOLIN HFA, PROAIR HFA) 90 mcg/actuation inhaler Take 2 Puffs by inhalation every four (4) hours as needed for Wheezing or Shortness of Breath. 1 Each 3    DAPTOMYCIN  mg by IntraVENous route daily. in 25mL NS      sodium chloride (Normal Saline Flush) 5-10 mL by IntraVENous route daily. flush IV catheter with 10ml before and after infusing each dose of medication as directed. heparin sodium,porcine (HEPARIN LOCK FLUSH IV) 3 mL by IntraVENous route daily. flush IV catheter with 3ml of heparin 10units/mL after the last dose of 0.9% sodium chloride flush, after each dose of medication or as directed. flush IV catheter every day if not in use. ferrous sulfate 325 mg (65 mg iron) tablet Take 1 Tablet by mouth two (2) times daily (with meals). 60 Tablet 3    apixaban (ELIQUIS) 5 mg tablet Take 1 Tablet by mouth two (2) times a day. 60 Tablet 2    tamsulosin (FLOMAX) 0.4 mg capsule Take 1 Capsule by mouth every evening. 30 Capsule 3    furosemide (Lasix) 20 mg tablet Take 1 Tablet by mouth daily. 30 Tablet 3    amLODIPine (NORVASC) 10 mg tablet Take 1 Tablet by mouth daily. 30 Tablet 3    pantoprazole (PROTONIX) 40 mg tablet Take 1 Tablet by mouth Before breakfast and dinner. 60 Tablet 0    OXYGEN-AIR DELIVERY SYSTEMS 2 L/min by Nasal route daily as needed for PRN Reason (Other) (SOB/wheezing). dextran 70/hypromellose (ARTIFICIAL TEARS, PF, OP) Administer 2 Drops to both eyes daily as needed for Other (dry eyes).          Past History     Past Medical History:  Past Medical History:   Diagnosis Date    BMI 30.0-30.9,adult 4/2/2022    Brain aneurysm     Brain aneurysm     Cancer [facility])     prostate    CHF (congestive heart failure) (HCC)     CKD (chronic kidney disease)     Closed nondisplaced supracondylar fracture of lower end of left femur without intracondylar extension with delayed healing     COPD (chronic obstructive pulmonary disease) ([facility] [address]     Debility     History of home oxygen therapy     [place] (hard of hearing)     Hypertension     Nocturia     On home oxygen therapy     PAF (paroxysmal atrial fibrillation) (HCC)     Pneumonia     Prostate CA ([address]     Prostate neoplasm     Radiation effect        Past Surgical History:  Past Surgical History:   Procedure Laterality Date    HX HERNIA REPAIR      HX HIP ARTHROSCOPY  04/09/2021       Family History:  Family History   Problem Relation Age of Onset    Heart Disease Mother        Social History:  Social History     Tobacco Use    Smoking status: Former     Types: Cigarettes    Smokeless tobacco: Never   Substance Use Topics    Alcohol use: No    Drug use: Never       Allergies: Allergies   Allergen Reactions    Aspirin Other (comments)     \"messes my stomach up\"    Bactrim [Sulfamethoxazole-Trimethoprim] Unknown (comments)    Nsaids (Non-Steroidal Anti-Inflammatory Drug) Unknown (comments)         Review of Systems     Review of Systems   Constitutional:  Negative for fever. HENT:  Negative for congestion and rhinorrhea. Eyes:  Negative for visual disturbance. Respiratory:  Positive for cough, shortness of breath and wheezing. Cardiovascular:  Negative for chest pain. Gastrointestinal:  Negative for abdominal pain, diarrhea, nausea and vomiting. Genitourinary:  Negative for urgency. Musculoskeletal:  Negative for myalgias. Skin:  Negative for rash. Neurological:  Negative for headaches. Physical Exam     Physical Exam  Constitutional:       General: He is not in acute distress. Appearance: Normal appearance. He is normal weight. He is not ill-appearing or toxic-appearing.       Comments: Well-appearing, nontoxic   HENT:      Head: Normocephalic and atraumatic. Right Ear: External ear normal.      Left Ear: External ear normal.      Nose: Nose normal. No congestion or rhinorrhea. Mouth/Throat:      Mouth: Mucous membranes are moist.      Pharynx: Oropharynx is clear. No oropharyngeal exudate or posterior oropharyngeal erythema. Eyes:      Conjunctiva/sclera: Conjunctivae normal.      Pupils: Pupils are equal, round, and reactive to light. Cardiovascular:      Rate and Rhythm: Normal rate and regular rhythm. Pulses: Normal pulses. Heart sounds: Normal heart sounds. No murmur heard. No friction rub. Pulmonary:      Effort: Pulmonary effort is normal.      Breath sounds: Wheezing (Expiratory wheezes in both lung fields more prominent on the right side) present. No rhonchi or rales. Abdominal:      General: Abdomen is flat. Tenderness: There is no abdominal tenderness. There is no guarding or rebound. Musculoskeletal:         General: No swelling or tenderness. Normal range of motion. Cervical back: Normal range of motion and neck supple. Right lower leg: No edema. Left lower leg: No edema. Comments: Chronic appearing wound to the left ankle does not look infected   Skin:     General: Skin is warm and dry. Capillary Refill: Capillary refill takes less than 2 seconds. Findings: No rash. Neurological:      General: No focal deficit present. Mental Status: He is alert. Motor: No weakness.        Lab and Diagnostic Study Results     Labs -  Recent Results (from the past 24 hour(s))   CBC WITH AUTOMATED DIFF    Collection Time: 11/28/22  6:30 PM   Result Value Ref Range    WBC 11.5 4.6 - 13.2 K/uL    RBC 3.53 (L) 4.35 - 5.65 M/uL    HGB 9.9 (L) 13.0 - 16.0 g/dL    HCT 30.2 (L) 36.0 - 48.0 %    MCV 85.6 78.0 - 100.0 FL    MCH 28.0 24.0 - 34.0 PG    MCHC 32.8 31.0 - 37.0 g/dL    RDW 14.3 11.6 - 14.5 %    PLATELET 061 129 - 635 K/uL    MPV 8.6 (L) 9.2 - 11.8 FL    NRBC 0.0 0  WBC    ABSOLUTE NRBC 0.00 0.00 - 0.01 K/uL    NEUTROPHILS 55 40 - 73 %    LYMPHOCYTES 9 (L) 21 - 52 %    MONOCYTES 3 3 - 10 %    EOSINOPHILS 33 (H) 0 - 5 %    BASOPHILS 0 0 - 2 %    IMMATURE GRANULOCYTES 0 %    ABS. NEUTROPHILS 6.4 1.8 - 8.0 K/UL    ABS. LYMPHOCYTES 1.0 0.9 - 3.6 K/UL    ABS. MONOCYTES 0.3 0.05 - 1.2 K/UL    ABS. EOSINOPHILS 3.8 (H) 0.0 - 0.4 K/UL    ABS. BASOPHILS 0.0 0.0 - 0.1 K/UL    ABS. IMM. GRANS. 0.0 K/UL    DF MANUAL      PLATELET COMMENTS ADEQUATE PLATELETS      RBC COMMENTS BASOPHILIC STIPPLING  1+        RBC COMMENTS OVALOCYTES  FEW        WBC COMMENTS TOXIC GRANULATION     METABOLIC PANEL, COMPREHENSIVE    Collection Time: 11/28/22  6:30 PM   Result Value Ref Range    Sodium 138 136 - 145 mmol/L    Potassium 4.6 3.5 - 5.5 mmol/L    Chloride 106 100 - 111 mmol/L    CO2 26 21 - 32 mmol/L    Anion gap 6 3.0 - 18 mmol/L    Glucose 93 74 - 99 mg/dL    BUN 26 (H) 7.0 - 18 MG/DL    Creatinine 1.35 (H) 0.6 - 1.3 MG/DL    BUN/Creatinine ratio 19 12 - 20      eGFR 53 (L) >60 ml/min/1.73m2    Calcium 8.2 (L) 8.5 - 10.1 MG/DL    Bilirubin, total 0.3 0.2 - 1.0 MG/DL    ALT (SGPT) 10 (L) 16 - 61 U/L    AST (SGOT) 12 10 - 38 U/L    Alk. phosphatase 106 45 - 117 U/L    Protein, total 6.1 (L) 6.4 - 8.2 g/dL    Albumin 2.7 (L) 3.4 - 5.0 g/dL    Globulin 3.4 2.0 - 4.0 g/dL    A-G Ratio 0.8 0.8 - 1.7           Radiologic Studies -   XR CHEST PORT   Final Result      No significant change or acute pulmonary process identified. Medical Decision Making and ED Course   - I am the first and primary provider for this patient AND AM THE PRIMARY PROVIDER OF RECORD. - I reviewed the vital signs, available nursing notes, past medical history, past surgical history, family history and social history. - Initial assessment performed. The patients presenting problems have been discussed, and the staff are in agreement with the care plan formulated and outlined with them.   I have encouraged them to ask questions as they arise throughout their visit. Vital Signs-Reviewed the patient's vital signs. Patient Vitals for the past 24 hrs:   Temp Pulse Resp BP SpO2   11/28/22 1749 98.8 °F (37.1 °C) 96 18 (!) 159/76 98 %         Nursing notes and pertinent past medical history including imaging and labs have been reviewed (if available)    ED Course:     ED Course as of 11/28/22 1932 Mon Nov 28, 2022 1930 Patient was reassigned, he states he wants to be discharged. He was reluctant to stay for another breathing treatment, however I encouraged him to stay for another nebulizer treatment to which he is agreeable. [GRAZYNA]      ED Course User Index  [GRAZYNA] Bryant Garcia DO       Provider Notes (Medical Decision Making):     MDM  Number of Diagnoses or Management Options  Acute exacerbation of chronic obstructive pulmonary disease (COPD) ([facility] [address]  Diagnosis management comments: Patient is a 55-year-old male with history of COPD presenting for COPD exacerbation with wheezing cough and shortness of breath. Prior to seeing us he was seen by EMS and they gave him a nebulizer treatment and Solu-Medrol and the patient states he feels better wants to go home. DDx: COPD, pneumonia, asthma, viral URI, flu, COVID, PE, ACS, etc.      He was reluctant but agreeable to proceeding with additional nebulizer treatment here. I will prescribe him some prednisone he already received steroids by EMS. We will discharge him. Is awake and alert, encouraged him to stay for potentially additional testing but he states this is his baseline COPD exacerbation. I advised the patient to return to the emergency room if he feels worse or reconsiders in any way in the meantime. Expresses understanding of the plan and all questions answered. Stable for discharge home.         _________________________________________________________________    At this time, patient is stable and appropriate for discharge home.   Patient demonstrates understanding of current diagnoses and is in agreement with the treatment plan. They are advised that while the likelihood of serious underlying condition is low at this point given the evaluation performed today, we cannot fully rule it out. They are advised to immediately return with any new symptoms or worsening of current condition. Any Incidental findings were noted on the patient's discharge paperwork as well as verbally directly to the patient, and the appropriate follow-up was given to the patient as far as instructions on testing needed as well as the timeframe. All questions have been answered. Patient is given educational material regarding their diagnoses, including danger symptoms and when to return to the ED. This note was dictated utilizing Dragon voice recognition software. Unfortunately this leads to occasional typographical errors. I apologize in advance if the situation occurs. If questions occur please do not hesitate to contact me directly. Abhi Baca DO          Procedures and Critical Care   Performed by: Abhi Baca DO  Procedures         Abhi Baca DO      Diagnosis:   1. Acute exacerbation of chronic obstructive pulmonary disease (COPD) ([facility] [address]          Disposition: Discharge    Follow-up Information       Follow up With Specialties Details Why Contact Info    Cecilia Lorenzana MD Family Medicine Call in 1 day  [address] [address]  [phone]      [facility] Emergency Medicine  As needed, If symptoms worsen; or [id number] [address] [address] [phone]            Patient's Medications   Start Taking    ALBUTEROL (PROVENTIL VENTOLIN) 2.5 MG /3 ML (0.083 %) NEBU    3 mL by Nebulization route every four (4) hours as needed for Wheezing, Shortness of Breath or Cough. DOXYCYCLINE (VIBRA-TABS) 100 MG TABLET    Take 1 Tablet by mouth two (2) times a day for 7 days.     PREDNISONE (DELTASONE) 20 MG TABLET    Take 3 Tablets by mouth daily (with breakfast) for 3 days, THEN 2 Tablets daily (with breakfast) for 3 days, THEN 1 Tablet daily (with breakfast) for 3 days, THEN 0.5 Tablets daily (with breakfast) for 3 days. Continue Taking    ALBUTEROL (PROVENTIL HFA, VENTOLIN HFA, PROAIR HFA) 90 MCG/ACTUATION INHALER    Take 2 Puffs by inhalation every four (4) hours as needed for Wheezing or Shortness of Breath. ALBUTEROL-IPRATROPIUM (DUO-NEB) 2.5 MG-0.5 MG/3 ML NEBU    3 mL by Nebulization route every four (4) hours as needed for Wheezing. AMLODIPINE (NORVASC) 10 MG TABLET    Take 1 Tablet by mouth daily. APIXABAN (ELIQUIS) 5 MG TABLET    Take 1 Tablet by mouth two (2) times a day. DAPTOMYCIN IV    450 mg by IntraVENous route daily. in 25mL NS    DEXTRAN 70/HYPROMELLOSE (ARTIFICIAL TEARS, PF, OP)    Administer 2 Drops to both eyes daily as needed for Other (dry eyes). FERROUS SULFATE 325 MG (65 MG IRON) TABLET    Take 1 Tablet by mouth two (2) times daily (with meals). FUROSEMIDE (LASIX) 20 MG TABLET    Take 1 Tablet by mouth daily. HEPARIN SODIUM,PORCINE (HEPARIN LOCK FLUSH IV)    3 mL by IntraVENous route daily. flush IV catheter with 3ml of heparin 10units/mL after the last dose of 0.9% sodium chloride flush, after each dose of medication or as directed. flush IV catheter every day if not in use. OXYGEN-AIR DELIVERY SYSTEMS    2 L/min by Nasal route daily as needed for PRN Reason (Other) (SOB/wheezing). PANTOPRAZOLE (PROTONIX) 40 MG TABLET    Take 1 Tablet by mouth Before breakfast and dinner. SODIUM CHLORIDE (NORMAL SALINE FLUSH)    5-10 mL by IntraVENous route daily. flush IV catheter with 10ml before and after infusing each dose of medication as directed. TAMSULOSIN (FLOMAX) 0.4 MG CAPSULE    Take 1 Capsule by mouth every evening.    These Medications have changed    No medications on file   Stop Taking    No medications on file       Patient seen in the context of the Novel Coronavirus (COVID19) pandemic, utilizing contemporary protocols and evidence based on the most up to date available evidence, understanding that the current evidence has the potential to change as additional information becomes available. This note is dictated utilizing Dragon voice recognition software. Unfortunately this leads to occasional typographical errors using the voice recognition. I apologize in advance if the situation occurs. If questions occur please do not hesitate to contact me directly.     Fawn Galvan, DO

## 2022-11-30 LAB — PERIPHERAL SMEAR,PSM: NORMAL

## 2022-12-14 ENCOUNTER — APPOINTMENT (OUTPATIENT)
Dept: GENERAL RADIOLOGY | Age: 77
DRG: 190 | End: 2022-12-14
Attending: EMERGENCY MEDICINE
Payer: MEDICARE

## 2022-12-14 ENCOUNTER — HOSPITAL ENCOUNTER (INPATIENT)
Age: 77
LOS: 4 days | Discharge: HOME HEALTH | DRG: 190 | End: 2022-12-18
Attending: EMERGENCY MEDICINE | Admitting: FAMILY MEDICINE
Payer: MEDICARE

## 2022-12-14 DIAGNOSIS — J44.1 ACUTE EXACERBATION OF CHRONIC OBSTRUCTIVE PULMONARY DISEASE (COPD) (HCC): ICD-10-CM

## 2022-12-14 DIAGNOSIS — R06.03 RESPIRATORY DISTRESS: Primary | ICD-10-CM

## 2022-12-14 LAB
ALBUMIN SERPL-MCNC: 3.5 G/DL (ref 3.4–5)
ALBUMIN/GLOB SERPL: 1 {RATIO} (ref 0.8–1.7)
ALP SERPL-CCNC: 136 U/L (ref 45–117)
ALT SERPL-CCNC: 13 U/L (ref 16–61)
ANION GAP SERPL CALC-SCNC: 7 MMOL/L (ref 3–18)
AST SERPL-CCNC: 11 U/L (ref 10–38)
BASOPHILS # BLD: 0.1 K/UL (ref 0–0.1)
BASOPHILS NFR BLD: 1 % (ref 0–2)
BILIRUB SERPL-MCNC: 0.4 MG/DL (ref 0.2–1)
BNP SERPL-MCNC: 380 PG/ML (ref 0–1800)
BUN SERPL-MCNC: 25 MG/DL (ref 7–18)
BUN/CREAT SERPL: 17 (ref 12–20)
CALCIUM SERPL-MCNC: 8.5 MG/DL (ref 8.5–10.1)
CHLORIDE SERPL-SCNC: 109 MMOL/L (ref 100–111)
CO2 SERPL-SCNC: 25 MMOL/L (ref 21–32)
CREAT SERPL-MCNC: 1.44 MG/DL (ref 0.6–1.3)
DIFFERENTIAL METHOD BLD: ABNORMAL
EOSINOPHIL # BLD: 2.2 K/UL (ref 0–0.4)
EOSINOPHIL NFR BLD: 19 % (ref 0–5)
ERYTHROCYTE [DISTWIDTH] IN BLOOD BY AUTOMATED COUNT: 14.5 % (ref 11.6–14.5)
FLUAV RNA SPEC QL NAA+PROBE: NOT DETECTED
FLUBV RNA SPEC QL NAA+PROBE: NOT DETECTED
GLOBULIN SER CALC-MCNC: 3.4 G/DL (ref 2–4)
GLUCOSE SERPL-MCNC: 112 MG/DL (ref 74–99)
HCT VFR BLD AUTO: 34.2 % (ref 36–48)
HGB BLD-MCNC: 10.9 G/DL (ref 13–16)
IMM GRANULOCYTES # BLD AUTO: 0 K/UL
IMM GRANULOCYTES NFR BLD AUTO: 0 %
LYMPHOCYTES # BLD: 1.8 K/UL (ref 0.9–3.6)
LYMPHOCYTES NFR BLD: 15 % (ref 21–52)
MAGNESIUM SERPL-MCNC: 2.1 MG/DL (ref 1.6–2.6)
MCH RBC QN AUTO: 27.9 PG (ref 24–34)
MCHC RBC AUTO-ENTMCNC: 31.9 G/DL (ref 31–37)
MCV RBC AUTO: 87.5 FL (ref 78–100)
MONOCYTES # BLD: 0.9 K/UL (ref 0.05–1.2)
MONOCYTES NFR BLD: 8 % (ref 3–10)
NEUTS SEG # BLD: 6.7 K/UL (ref 1.8–8)
NEUTS SEG NFR BLD: 57 % (ref 40–73)
NRBC # BLD: 0 K/UL (ref 0–0.01)
NRBC BLD-RTO: 0 PER 100 WBC
PLATELET # BLD AUTO: 333 K/UL (ref 135–420)
PMV BLD AUTO: 8.9 FL (ref 9.2–11.8)
POTASSIUM SERPL-SCNC: 4.1 MMOL/L (ref 3.5–5.5)
PROT SERPL-MCNC: 6.9 G/DL (ref 6.4–8.2)
RBC # BLD AUTO: 3.91 M/UL (ref 4.35–5.65)
RBC MORPH BLD: ABNORMAL
SARS-COV-2, COV2: NOT DETECTED
SODIUM SERPL-SCNC: 141 MMOL/L (ref 136–145)
TROPONIN-HIGH SENSITIVITY: 7 NG/L (ref 0–78)
WBC # BLD AUTO: 11.7 K/UL (ref 4.6–13.2)

## 2022-12-14 PROCEDURE — 94640 AIRWAY INHALATION TREATMENT: CPT

## 2022-12-14 PROCEDURE — 83880 ASSAY OF NATRIURETIC PEPTIDE: CPT

## 2022-12-14 PROCEDURE — 74011000250 HC RX REV CODE- 250: Performed by: EMERGENCY MEDICINE

## 2022-12-14 PROCEDURE — 99285 EMERGENCY DEPT VISIT HI MDM: CPT

## 2022-12-14 PROCEDURE — 93005 ELECTROCARDIOGRAM TRACING: CPT

## 2022-12-14 PROCEDURE — 74011000250 HC RX REV CODE- 250: Performed by: FAMILY MEDICINE

## 2022-12-14 PROCEDURE — 83735 ASSAY OF MAGNESIUM: CPT

## 2022-12-14 PROCEDURE — 84484 ASSAY OF TROPONIN QUANT: CPT

## 2022-12-14 PROCEDURE — 74011250636 HC RX REV CODE- 250/636: Performed by: FAMILY MEDICINE

## 2022-12-14 PROCEDURE — 85025 COMPLETE CBC W/AUTO DIFF WBC: CPT

## 2022-12-14 PROCEDURE — 74011250636 HC RX REV CODE- 250/636: Performed by: EMERGENCY MEDICINE

## 2022-12-14 PROCEDURE — 80053 COMPREHEN METABOLIC PANEL: CPT

## 2022-12-14 PROCEDURE — 77010033711 HC HIGH FLOW OXYGEN

## 2022-12-14 PROCEDURE — 65270000046 HC RM TELEMETRY

## 2022-12-14 PROCEDURE — 96366 THER/PROPH/DIAG IV INF ADDON: CPT

## 2022-12-14 PROCEDURE — 96365 THER/PROPH/DIAG IV INF INIT: CPT

## 2022-12-14 PROCEDURE — 71045 X-RAY EXAM CHEST 1 VIEW: CPT

## 2022-12-14 PROCEDURE — 96375 TX/PRO/DX INJ NEW DRUG ADDON: CPT

## 2022-12-14 PROCEDURE — 74011000258 HC RX REV CODE- 258: Performed by: FAMILY MEDICINE

## 2022-12-14 PROCEDURE — 87636 SARSCOV2 & INF A&B AMP PRB: CPT

## 2022-12-14 PROCEDURE — 87040 BLOOD CULTURE FOR BACTERIA: CPT

## 2022-12-14 PROCEDURE — 74011250637 HC RX REV CODE- 250/637: Performed by: FAMILY MEDICINE

## 2022-12-14 RX ORDER — ONDANSETRON 2 MG/ML
4 INJECTION INTRAMUSCULAR; INTRAVENOUS
Status: DISCONTINUED | OUTPATIENT
Start: 2022-12-14 | End: 2022-12-18 | Stop reason: HOSPADM

## 2022-12-14 RX ORDER — PANTOPRAZOLE SODIUM 40 MG/1
40 TABLET, DELAYED RELEASE ORAL
Status: DISCONTINUED | OUTPATIENT
Start: 2022-12-15 | End: 2022-12-18 | Stop reason: HOSPADM

## 2022-12-14 RX ORDER — HEPARIN SODIUM 5000 [USP'U]/ML
5000 INJECTION, SOLUTION INTRAVENOUS; SUBCUTANEOUS EVERY 8 HOURS
Status: DISCONTINUED | OUTPATIENT
Start: 2022-12-14 | End: 2022-12-14

## 2022-12-14 RX ORDER — ACETAMINOPHEN 325 MG/1
650 TABLET ORAL
Status: DISCONTINUED | OUTPATIENT
Start: 2022-12-14 | End: 2022-12-18 | Stop reason: HOSPADM

## 2022-12-14 RX ORDER — SODIUM CHLORIDE 0.9 % (FLUSH) 0.9 %
5-40 SYRINGE (ML) INJECTION AS NEEDED
Status: DISCONTINUED | OUTPATIENT
Start: 2022-12-14 | End: 2022-12-18 | Stop reason: HOSPADM

## 2022-12-14 RX ORDER — IPRATROPIUM BROMIDE AND ALBUTEROL SULFATE 2.5; .5 MG/3ML; MG/3ML
3 SOLUTION RESPIRATORY (INHALATION)
Status: DISCONTINUED | OUTPATIENT
Start: 2022-12-14 | End: 2022-12-18 | Stop reason: HOSPADM

## 2022-12-14 RX ORDER — NALOXONE HYDROCHLORIDE 0.4 MG/ML
0.4 INJECTION, SOLUTION INTRAMUSCULAR; INTRAVENOUS; SUBCUTANEOUS AS NEEDED
Status: DISCONTINUED | OUTPATIENT
Start: 2022-12-14 | End: 2022-12-18 | Stop reason: HOSPADM

## 2022-12-14 RX ORDER — OXYCODONE HYDROCHLORIDE 5 MG/1
5 TABLET ORAL
Status: DISCONTINUED | OUTPATIENT
Start: 2022-12-14 | End: 2022-12-18 | Stop reason: HOSPADM

## 2022-12-14 RX ORDER — BISACODYL 5 MG
5 TABLET, DELAYED RELEASE (ENTERIC COATED) ORAL DAILY PRN
Status: DISCONTINUED | OUTPATIENT
Start: 2022-12-14 | End: 2022-12-18 | Stop reason: HOSPADM

## 2022-12-14 RX ORDER — FUROSEMIDE 20 MG/1
20 TABLET ORAL DAILY
Status: DISCONTINUED | OUTPATIENT
Start: 2022-12-15 | End: 2022-12-18 | Stop reason: HOSPADM

## 2022-12-14 RX ORDER — IPRATROPIUM BROMIDE AND ALBUTEROL SULFATE 2.5; .5 MG/3ML; MG/3ML
3 SOLUTION RESPIRATORY (INHALATION)
Status: COMPLETED | OUTPATIENT
Start: 2022-12-14 | End: 2022-12-14

## 2022-12-14 RX ORDER — LANOLIN ALCOHOL/MO/W.PET/CERES
1 CREAM (GRAM) TOPICAL 2 TIMES DAILY WITH MEALS
Status: DISCONTINUED | OUTPATIENT
Start: 2022-12-15 | End: 2022-12-18 | Stop reason: HOSPADM

## 2022-12-14 RX ORDER — ARFORMOTEROL TARTRATE 15 UG/2ML
15 SOLUTION RESPIRATORY (INHALATION)
Status: DISCONTINUED | OUTPATIENT
Start: 2022-12-14 | End: 2022-12-18 | Stop reason: HOSPADM

## 2022-12-14 RX ORDER — SODIUM CHLORIDE 0.9 % (FLUSH) 0.9 %
5-40 SYRINGE (ML) INJECTION EVERY 8 HOURS
Status: DISCONTINUED | OUTPATIENT
Start: 2022-12-14 | End: 2022-12-18 | Stop reason: HOSPADM

## 2022-12-14 RX ORDER — TAMSULOSIN HYDROCHLORIDE 0.4 MG/1
0.4 CAPSULE ORAL DAILY
Status: DISCONTINUED | OUTPATIENT
Start: 2022-12-15 | End: 2022-12-18 | Stop reason: HOSPADM

## 2022-12-14 RX ORDER — MAGNESIUM SULFATE HEPTAHYDRATE 40 MG/ML
2 INJECTION, SOLUTION INTRAVENOUS ONCE
Status: COMPLETED | OUTPATIENT
Start: 2022-12-14 | End: 2022-12-14

## 2022-12-14 RX ORDER — BUDESONIDE 0.5 MG/2ML
500 INHALANT ORAL
Status: DISCONTINUED | OUTPATIENT
Start: 2022-12-14 | End: 2022-12-18 | Stop reason: HOSPADM

## 2022-12-14 RX ADMIN — METHYLPREDNISOLONE SODIUM SUCCINATE 125 MG: 125 INJECTION, POWDER, FOR SOLUTION INTRAMUSCULAR; INTRAVENOUS at 17:23

## 2022-12-14 RX ADMIN — IPRATROPIUM BROMIDE AND ALBUTEROL SULFATE 3 ML: .5; 3 SOLUTION RESPIRATORY (INHALATION) at 19:49

## 2022-12-14 RX ADMIN — IPRATROPIUM BROMIDE AND ALBUTEROL SULFATE 3 ML: .5; 3 SOLUTION RESPIRATORY (INHALATION) at 17:15

## 2022-12-14 RX ADMIN — DOXYCYCLINE 100 MG: 100 INJECTION, POWDER, LYOPHILIZED, FOR SOLUTION INTRAVENOUS at 22:30

## 2022-12-14 RX ADMIN — MAGNESIUM SULFATE HEPTAHYDRATE 2 G: 40 INJECTION, SOLUTION INTRAVENOUS at 17:24

## 2022-12-14 RX ADMIN — SODIUM CHLORIDE, PRESERVATIVE FREE 10 ML: 5 INJECTION INTRAVENOUS at 22:30

## 2022-12-14 RX ADMIN — BUDESONIDE 500 MCG: 0.5 INHALANT RESPIRATORY (INHALATION) at 22:07

## 2022-12-14 RX ADMIN — ARFORMOTEROL TARTRATE 15 MCG: 15 SOLUTION RESPIRATORY (INHALATION) at 22:07

## 2022-12-14 RX ADMIN — APIXABAN 5 MG: 5 TABLET, FILM COATED ORAL at 22:30

## 2022-12-14 NOTE — ED TRIAGE NOTES
Arrived via EMS with c/o SOB. 2 neb txs given en route. Pt 100% on 4L NC upon arrival. States \"I can't breathe\". Lung sounds course, congested with productive cough.

## 2022-12-14 NOTE — ED PROVIDER NOTES
EMERGENCY DEPARTMENT HISTORY AND PHYSICAL EXAM    Date: 12/14/2022  Patient Name: Brock Riojas    History of Presenting Illness     No chief complaint on file. History Provided By: Patient and EMS     History Baylee Ann):   5:06 PM  Brock Riojas is a 76 y.o. male with a PMHX of CHF, CKD, COPD, hypertension  who presents to the emergency department (room 4) by EMS C/O respiratory distress onset this afternoon. Associated sxs include shortness of breath, cough. Pt denies chest pain or any other sxs or complaints. This patient has an acute life-threatening emergency which requires my immediate presence. Chief Complaint: Respiratory distress  Onset: Today  Timing:  Acute  Context: Symptoms started spontaneously, symptoms have rapidly worsened since onset  Location: Lungs  Quality: Pressure and Tightness  Severity: Severe  Modifying Factors: Nothing makes it better, or worse.   Associated Symptoms:  Cough, shortness of breath    PCP: Nadira Morillo MD     Past History         Past Medical History:  Past Medical History:   Diagnosis Date    BMI 30.0-30.9,adult 4/2/2022    Brain aneurysm     Brain aneurysm     Cancer [facility])     prostate    CHF (congestive heart failure) (HCC)     CKD (chronic kidney disease)     Closed nondisplaced supracondylar fracture of lower end of left femur without intracondylar extension with delayed healing     COPD (chronic obstructive pulmonary disease) ([address]     Debility     History of home oxygen therapy     [place] (hard of hearing)     Hypertension     Nocturia     On home oxygen therapy     PAF (paroxysmal atrial fibrillation) (HCC)     Pneumonia     Prostate CA ([address]     Prostate neoplasm     Radiation effect        Past Surgical History:  Past Surgical History:   Procedure Laterality Date    HX HERNIA REPAIR      HX HIP ARTHROSCOPY  04/09/2021       Family History:  Family History   Problem Relation Age of Onset    Heart Disease Mother    Reviewed and non-contributory    Social History:  Social History     Tobacco Use    Smoking status: Former     Types: Cigarettes    Smokeless tobacco: Never   Substance Use Topics    Alcohol use: No    Drug use: Never       Medications:  Current Facility-Administered Medications   Medication Dose Route Frequency Provider Last Rate Last Admin    albuterol-ipratropium (DUO-NEB) 2.5 MG-0.5 MG/3 ML  3 mL Nebulization NOW Vinita Shane MD         Current Outpatient Medications   Medication Sig Dispense Refill    albuterol (PROVENTIL VENTOLIN) 2.5 mg /3 mL (0.083 %) nebu 3 mL by Nebulization route every four (4) hours as needed for Wheezing, Shortness of Breath or Cough. 30 Each 1    albuterol-ipratropium (DUO-NEB) 2.5 mg-0.5 mg/3 ml nebu 3 mL by Nebulization route every four (4) hours as needed for Wheezing. 10 Each 0    albuterol (PROVENTIL HFA, VENTOLIN HFA, PROAIR HFA) 90 mcg/actuation inhaler Take 2 Puffs by inhalation every four (4) hours as needed for Wheezing or Shortness of Breath. 1 Each 3    DAPTOMYCIN  mg by IntraVENous route daily. in 25mL NS      sodium chloride (Normal Saline Flush) 5-10 mL by IntraVENous route daily. flush IV catheter with 10ml before and after infusing each dose of medication as directed. heparin sodium,porcine (HEPARIN LOCK FLUSH IV) 3 mL by IntraVENous route daily. flush IV catheter with 3ml of heparin 10units/mL after the last dose of 0.9% sodium chloride flush, after each dose of medication or as directed. flush IV catheter every day if not in use. ferrous sulfate 325 mg (65 mg iron) tablet Take 1 Tablet by mouth two (2) times daily (with meals). 60 Tablet 3    apixaban (ELIQUIS) 5 mg tablet Take 1 Tablet by mouth two (2) times a day. 60 Tablet 2    tamsulosin (FLOMAX) 0.4 mg capsule Take 1 Capsule by mouth every evening. 30 Capsule 3    furosemide (Lasix) 20 mg tablet Take 1 Tablet by mouth daily. 30 Tablet 3    amLODIPine (NORVASC) 10 mg tablet Take 1 Tablet by mouth daily.  30 Tablet 3 pantoprazole (PROTONIX) 40 mg tablet Take 1 Tablet by mouth Before breakfast and dinner. 60 Tablet 0    OXYGEN-AIR DELIVERY SYSTEMS 2 L/min by Nasal route daily as needed for PRN Reason (Other) (SOB/wheezing). dextran 70/hypromellose (ARTIFICIAL TEARS, PF, OP) Administer 2 Drops to both eyes daily as needed for Other (dry eyes). Allergies: Allergies   Allergen Reactions    Aspirin Other (comments)     \"messes my stomach up\"    Bactrim [Sulfamethoxazole-Trimethoprim] Unknown (comments)    Nsaids (Non-Steroidal Anti-Inflammatory Drug) Unknown (comments)       Social Determinants of Health:  Social Determinants of Health     Tobacco Use: Medium Risk    Smoking Tobacco Use: Former    Smokeless Tobacco Use: Never    Passive Exposure: Not on file   Alcohol Use: Not on file   Financial Resource Strain: Not on file   Food Insecurity: Not on file   Transportation Needs: Not on file   Physical Activity: Not on file   Stress: Not on file   Social Connections: Not on file   Intimate Partner Violence: Not on file   Depression: Not on file   Housing Stability: Not on file       Review of Systems      Review of Systems   Constitutional:  Negative for chills and fever. HENT:  Negative for rhinorrhea and sore throat. Eyes:  Negative for pain and visual disturbance. Respiratory:  Positive for cough, chest tightness and shortness of breath. Negative for wheezing. Cardiovascular:  Negative for chest pain and palpitations. Gastrointestinal:  Negative for abdominal pain, diarrhea, nausea and vomiting. Musculoskeletal:  Negative for arthralgias and myalgias. Skin:  Negative for rash and wound. Neurological:  Negative for speech difficulty, light-headedness and headaches. Psychiatric/Behavioral:  Negative for agitation and confusion. All other systems reviewed and are negative.     Physical Exam     Vitals:    12/14/22 1729 12/14/22 1736 12/14/22 1740 12/14/22 1858   BP:   136/62 (!) 126/58   Pulse:   96 85   Resp:   18 19   Temp:       SpO2: 100% 98% 96% 98%       Physical Exam  Vitals and nursing note reviewed. Constitutional:       General: He is in acute distress. Appearance: Normal appearance. He is normal weight. He is not ill-appearing. HENT:      Head: Normocephalic and atraumatic. Nose: Nose normal. No rhinorrhea. Mouth/Throat:      Mouth: Mucous membranes are moist.      Pharynx: No oropharyngeal exudate or posterior oropharyngeal erythema. Eyes:      Extraocular Movements: Extraocular movements intact. Conjunctiva/sclera: Conjunctivae normal.      Pupils: Pupils are equal, round, and reactive to light. Cardiovascular:      Rate and Rhythm: Normal rate and regular rhythm. Heart sounds: No murmur heard. No friction rub. No gallop. Pulmonary:      Effort: Accessory muscle usage and respiratory distress present. Breath sounds: Decreased air movement present. Examination of the right-middle field reveals decreased breath sounds, wheezing and rales. Examination of the left-middle field reveals decreased breath sounds, wheezing and rales. Examination of the right-lower field reveals decreased breath sounds, wheezing and rales. Examination of the left-lower field reveals decreased breath sounds, wheezing and rales. Decreased breath sounds, wheezing and rales present. No rhonchi. Abdominal:      General: Bowel sounds are normal.      Palpations: Abdomen is soft. Tenderness: There is no abdominal tenderness. There is no guarding or rebound. Musculoskeletal:         General: No swelling, tenderness or deformity. Normal range of motion. Cervical back: Normal range of motion and neck supple. No rigidity. Lymphadenopathy:      Cervical: No cervical adenopathy. Skin:     General: Skin is warm and dry. Findings: No rash. Neurological:      General: No focal deficit present. Mental Status: He is alert and oriented to person, place, and time. Psychiatric:         Mood and Affect: Mood normal.         Behavior: Behavior normal.       Diagnostic Study Results     Labs -  Recent Results (from the past 12 hour(s))   CBC WITH AUTOMATED DIFF    Collection Time: 12/14/22  5:12 PM   Result Value Ref Range    WBC 11.7 4.6 - 13.2 K/uL    RBC 3.91 (L) 4.35 - 5.65 M/uL    HGB 10.9 (L) 13.0 - 16.0 g/dL    HCT 34.2 (L) 36.0 - 48.0 %    MCV 87.5 78.0 - 100.0 FL    MCH 27.9 24.0 - 34.0 PG    MCHC 31.9 31.0 - 37.0 g/dL    RDW 14.5 11.6 - 14.5 %    PLATELET 543 850 - 834 K/uL    MPV 8.9 (L) 9.2 - 11.8 FL    NRBC 0.0 0  WBC    ABSOLUTE NRBC 0.00 0.00 - 0.01 K/uL    NEUTROPHILS 57 40 - 73 %    LYMPHOCYTES 15 (L) 21 - 52 %    MONOCYTES 8 3 - 10 %    EOSINOPHILS 19 (H) 0 - 5 %    BASOPHILS 1 0 - 2 %    IMMATURE GRANULOCYTES 0 %    ABS. NEUTROPHILS 6.7 1.8 - 8.0 K/UL    ABS. LYMPHOCYTES 1.8 0.9 - 3.6 K/UL    ABS. MONOCYTES 0.9 0.05 - 1.2 K/UL    ABS. EOSINOPHILS 2.2 (H) 0.0 - 0.4 K/UL    ABS. BASOPHILS 0.1 0.0 - 0.1 K/UL    ABS. IMM. GRANS. 0.0 K/UL    DF MANUAL      RBC COMMENTS OVALOCYTES  FEW       METABOLIC PANEL, COMPREHENSIVE    Collection Time: 12/14/22  5:12 PM   Result Value Ref Range    Sodium 141 136 - 145 mmol/L    Potassium 4.1 3.5 - 5.5 mmol/L    Chloride 109 100 - 111 mmol/L    CO2 25 21 - 32 mmol/L    Anion gap 7 3.0 - 18 mmol/L    Glucose 112 (H) 74 - 99 mg/dL    BUN 25 (H) 7.0 - 18 MG/DL    Creatinine 1.44 (H) 0.6 - 1.3 MG/DL    BUN/Creatinine ratio 17 12 - 20      eGFR 49 (L) >60 ml/min/1.73m2    Calcium 8.5 8.5 - 10.1 MG/DL    Bilirubin, total 0.4 0.2 - 1.0 MG/DL    ALT (SGPT) 13 (L) 16 - 61 U/L    AST (SGOT) 11 10 - 38 U/L    Alk.  phosphatase 136 (H) 45 - 117 U/L    Protein, total 6.9 6.4 - 8.2 g/dL    Albumin 3.5 3.4 - 5.0 g/dL    Globulin 3.4 2.0 - 4.0 g/dL    A-G Ratio 1.0 0.8 - 1.7     MAGNESIUM    Collection Time: 12/14/22  5:12 PM   Result Value Ref Range    Magnesium 2.1 1.6 - 2.6 mg/dL   TROPONIN-HIGH SENSITIVITY    Collection Time: 12/14/22  5:12 PM   Result Value Ref Range    Troponin-High Sensitivity 7 0 - 78 ng/L   NT-PRO BNP    Collection Time: 12/14/22  5:12 PM   Result Value Ref Range    NT pro- 0 - 1,800 PG/ML   COVID-19 WITH INFLUENZA A/B    Collection Time: 12/14/22  5:30 PM   Result Value Ref Range    SARS-CoV-2 by PCR Not detected NOTD      Influenza A by PCR Not detected NOTD      Influenza B by PCR Not detected NOTD         Radiologic Studies -   XR CHEST PORT   Final Result      Chronic right-sided pleural plaques. Decreased right lung volume and lower   thoracic haziness/reticulation likely atelectasis. CT Results  (Last 48 hours)      None          CXR Results  (Last 48 hours)                 12/14/22 1738  XR CHEST PORT Final result    Impression:      Chronic right-sided pleural plaques. Decreased right lung volume and lower   thoracic haziness/reticulation likely atelectasis. Narrative:  EXAM: FRONTAL CHEST RADIOGRAPH       CLINICAL INDICATION/HISTORY: Dyspnea       COMPARISON: 11/28/2022       TECHNIQUE: Frontal view of the chest       _______________       FINDINGS:       HEART AND MEDIASTINUM: Normal cardiomediastinal silhouette. LUNGS AND PLEURAL SPACES: Right thoracic volume loss unchanged. Oval   hyperdensity overlying the right midlung corresponds unchanged and related to   pleural plaques. Lower right pulmonary reticulation and haziness likely   atelectatic. Left lung is clear.        BONY THORAX AND SOFT TISSUES: Unremarkable.       _______________                   Medications given in the ED-  Medications   albuterol-ipratropium (DUO-NEB) 2.5 MG-0.5 MG/3 ML (has no administration in time range)   methylPREDNISolone (PF) (Solu-MEDROL) injection 125 mg (125 mg IntraVENous Given 12/14/22 1723)   albuterol-ipratropium (DUO-NEB) 2.5 MG-0.5 MG/3 ML (3 mL Nebulization Given 12/14/22 1715)   magnesium sulfate 2 g/50 ml IVPB (premix or compounded) (0 g IntraVENous IV Completed 12/14/22 1928) Procedures     Procedures    ED Course     I Donna Younger MD) am the first provider for this patient. I reviewed the vital signs, available nursing notes, past medical history, past surgical history, family history and social history. Records Reviewed: Nursing Notes    Cardiac Monitor:  Rate: 109 bpm  Rhythm: tachycardic rhythm    Pulse Oximetry Analysis - 100% on 4 L/min by nasal cannula    EKG interpretation: (Preliminary)  Rhythm: Sinus tachycardia. Rate: 103 bpm; no STEMI  EKG read by Donna Younger MD at 4:58 PM    5:06 PM Initial assessment performed. The patients presenting problems have been discussed, and they are in agreement with the care plan formulated and outlined with them. I have encouraged them to ask questions as they arise throughout their visit. ED Course as of 12/14/22 2002   Wed Dec 14, 2022   1930 Reevaluated patient. Improved breathing on high flow. We will continue nebulizers and admit to hospitalist. [JM]   2001 Discussed with Dr. Natasha Mendes for telemetry admission [JM]      ED Course User Index  [JM] Michaelyn Gosselin, MD         Medical Decision Making     Provider Notes (Medical Decision Making):   DDX: URI, pneumonia, CHF, COVID, influenza, COPD    Discussion:  76 y.o. male presenting by EMS with acute respiratory distress. Patient had negative influenza and COVID test today. He also had a chest x-ray which appears stable from prior. Patient had a relatively low proBNP. He was given 2 nebulizers by EMS but was still having marked difficulty breathing upon arrival to the ED. Patient was given Solu-Medrol, DuoNeb, magnesium and placed on high flow. Patient improved with treatment and weaning was considered in the ED. Pending ABG but feel patient would benefit from continued nebulizers in the hospital overnight. Discussed with hospitalist for medical admission. Patient understands and agrees with the need for admission.     Critical Care Time:   I have spent 75 minutes of critical care time involved in lab review, consultations with specialist, family decision-making, and documentation. This time does not include time spent on separately billable procedures. During this entire length of time I was immediately available to the patient. Critical Care: The reason for providing this level of medical care for this critically ill patient was due a critical illness that impaired one or more vital organ systems such that there was a high probability of imminent or life threatening deterioration in the patients condition. This care involved high complexity decision making to assess, manipulate, and support vital system functions, to treat this degreee vital organ system failure and to prevent further life threatening deterioration of the patients condition. Aline Velasquez MD    Diagnosis and Disposition     Critical Care Time: 75 minutes    Core Measures:  For Hospitalized Patients:    1. Hospitalization Decision Time:  The decision to hospitalize the patient was made by Alyse Juarez MD, MD at 7:30 PM on 12/14/2022    2. Aspirin: Aspirin was not given because the patient did not present with a stroke at the time of their Emergency Department evaluation    8:01 PM Patient is being admitted to the hospital by Dr. Adrienne Duarte. The results of their tests and reasons for their admission have been discussed with them and/or available family. They convey agreement and understanding for the need to be admitted and for their admission diagnosis. CONDITIONS ON ADMISSION:  Sepsis is not present at the time of admission. Deep Vein Thrombosis is not present at the time of admission. Thrombosis is not present at the time of admission. Urinary Tract Infection is not present at the time of admission. Pneumonia is not present at the time of admission. MRSA is not present at the time of admission. Wound infection is not present at the time of admission.  Pressure Ulcer is not present at the time of admission. CLINICAL IMPRESSION:    1. Respiratory distress    2. Acute exacerbation of chronic obstructive pulmonary disease (COPD) (HCC)      I Kylie Burk MD am the primary clinician of record. Dragon Disclaimer     Please note that this dictation was completed with [employer], the computer voice recognition software. Quite often unanticipated grammatical, syntax, homophones, and other interpretive errors are inadvertently transcribed by the computer software. Please disregard these errors. Please excuse any errors that have escaped final proofreading.     Kylie Burk MD

## 2022-12-14 NOTE — PROGRESS NOTES
Patient had nose bleed before HFNC placed on patient. Added lubricating jelly to nares before placing on patient.

## 2022-12-15 PROBLEM — R06.03 RESPIRATORY DISTRESS: Status: RESOLVED | Noted: 2022-12-14 | Resolved: 2022-12-15

## 2022-12-15 PROBLEM — J44.1 COPD EXACERBATION (HCC): Status: RESOLVED | Noted: 2022-01-01 | Resolved: 2022-01-01

## 2022-12-15 PROBLEM — R06.03 RESPIRATORY DISTRESS: Status: RESOLVED | Noted: 2022-01-01 | Resolved: 2022-01-01

## 2022-12-15 PROBLEM — J44.1 COPD EXACERBATION (HCC): Status: RESOLVED | Noted: 2022-12-14 | Resolved: 2022-12-15

## 2022-12-15 LAB
ANION GAP SERPL CALC-SCNC: 6 MMOL/L (ref 3–18)
ARTERIAL PATENCY WRIST A: POSITIVE
BASE DEFICIT BLD-SCNC: 2.4 MMOL/L
BDY SITE: NORMAL
BODY TEMPERATURE: 98.6
BUN SERPL-MCNC: 26 MG/DL (ref 7–18)
BUN/CREAT SERPL: 19 (ref 12–20)
CALCIUM SERPL-MCNC: 8.7 MG/DL (ref 8.5–10.1)
CHLORIDE SERPL-SCNC: 108 MMOL/L (ref 100–111)
CO2 SERPL-SCNC: 23 MMOL/L (ref 21–32)
CREAT SERPL-MCNC: 1.36 MG/DL (ref 0.6–1.3)
ERYTHROCYTE [DISTWIDTH] IN BLOOD BY AUTOMATED COUNT: 14 % (ref 11.6–14.5)
GAS FLOW.O2 O2 DELIVERY SYS: NORMAL L/MIN
GAS FLOW.O2 SETTING OXYMISER: 18 BPM
GLUCOSE SERPL-MCNC: 160 MG/DL (ref 74–99)
HCO3 BLD-SCNC: 22.5 MMOL/L (ref 22–26)
HCT VFR BLD AUTO: 31.4 % (ref 36–48)
HGB BLD-MCNC: 9.9 G/DL (ref 13–16)
MCH RBC QN AUTO: 27.9 PG (ref 24–34)
MCHC RBC AUTO-ENTMCNC: 31.5 G/DL (ref 31–37)
MCV RBC AUTO: 88.5 FL (ref 78–100)
NRBC # BLD: 0 K/UL (ref 0–0.01)
NRBC BLD-RTO: 0 PER 100 WBC
PCO2 BLD: 38.3 MMHG (ref 35–45)
PH BLD: 7.38 [PH] (ref 7.35–7.45)
PLATELET # BLD AUTO: 283 K/UL (ref 135–420)
PMV BLD AUTO: 9.2 FL (ref 9.2–11.8)
PO2 BLD: 87 MMHG (ref 80–100)
POTASSIUM SERPL-SCNC: 4.5 MMOL/L (ref 3.5–5.5)
RBC # BLD AUTO: 3.55 M/UL (ref 4.35–5.65)
SAO2 % BLD: 96.5 % (ref 92–97)
SERVICE CMNT-IMP: NORMAL
SODIUM SERPL-SCNC: 137 MMOL/L (ref 136–145)
SPECIMEN TYPE: NORMAL
WBC # BLD AUTO: 8.5 K/UL (ref 4.6–13.2)

## 2022-12-15 PROCEDURE — 97162 PT EVAL MOD COMPLEX 30 MIN: CPT

## 2022-12-15 PROCEDURE — 80048 BASIC METABOLIC PNL TOTAL CA: CPT

## 2022-12-15 PROCEDURE — 65270000046 HC RM TELEMETRY

## 2022-12-15 PROCEDURE — 74011250637 HC RX REV CODE- 250/637: Performed by: FAMILY MEDICINE

## 2022-12-15 PROCEDURE — 97166 OT EVAL MOD COMPLEX 45 MIN: CPT

## 2022-12-15 PROCEDURE — 36600 WITHDRAWAL OF ARTERIAL BLOOD: CPT

## 2022-12-15 PROCEDURE — 36415 COLL VENOUS BLD VENIPUNCTURE: CPT

## 2022-12-15 PROCEDURE — 74011250636 HC RX REV CODE- 250/636: Performed by: FAMILY MEDICINE

## 2022-12-15 PROCEDURE — 94640 AIRWAY INHALATION TREATMENT: CPT

## 2022-12-15 PROCEDURE — 87040 BLOOD CULTURE FOR BACTERIA: CPT

## 2022-12-15 PROCEDURE — 74011000250 HC RX REV CODE- 250: Performed by: FAMILY MEDICINE

## 2022-12-15 PROCEDURE — 85027 COMPLETE CBC AUTOMATED: CPT

## 2022-12-15 PROCEDURE — 74011000258 HC RX REV CODE- 258: Performed by: FAMILY MEDICINE

## 2022-12-15 PROCEDURE — 97530 THERAPEUTIC ACTIVITIES: CPT

## 2022-12-15 PROCEDURE — 82803 BLOOD GASES ANY COMBINATION: CPT

## 2022-12-15 RX ADMIN — PANTOPRAZOLE SODIUM 40 MG: 40 TABLET, DELAYED RELEASE ORAL at 06:56

## 2022-12-15 RX ADMIN — ARFORMOTEROL TARTRATE 15 MCG: 15 SOLUTION RESPIRATORY (INHALATION) at 07:50

## 2022-12-15 RX ADMIN — APIXABAN 5 MG: 5 TABLET, FILM COATED ORAL at 08:37

## 2022-12-15 RX ADMIN — FERROUS SULFATE TAB 325 MG (65 MG ELEMENTAL FE) 325 MG: 325 (65 FE) TAB at 18:47

## 2022-12-15 RX ADMIN — TAMSULOSIN HYDROCHLORIDE 0.4 MG: 0.4 CAPSULE ORAL at 08:37

## 2022-12-15 RX ADMIN — METHYLPREDNISOLONE SODIUM SUCCINATE 60 MG: 125 INJECTION, POWDER, FOR SOLUTION INTRAMUSCULAR; INTRAVENOUS at 13:39

## 2022-12-15 RX ADMIN — METHYLPREDNISOLONE SODIUM SUCCINATE 60 MG: 125 INJECTION, POWDER, FOR SOLUTION INTRAMUSCULAR; INTRAVENOUS at 18:47

## 2022-12-15 RX ADMIN — FUROSEMIDE 20 MG: 20 TABLET ORAL at 08:37

## 2022-12-15 RX ADMIN — BUDESONIDE 500 MCG: 0.5 INHALANT RESPIRATORY (INHALATION) at 20:34

## 2022-12-15 RX ADMIN — ARFORMOTEROL TARTRATE 15 MCG: 15 SOLUTION RESPIRATORY (INHALATION) at 20:34

## 2022-12-15 RX ADMIN — APIXABAN 5 MG: 5 TABLET, FILM COATED ORAL at 22:49

## 2022-12-15 RX ADMIN — BUDESONIDE 500 MCG: 0.5 INHALANT RESPIRATORY (INHALATION) at 07:50

## 2022-12-15 RX ADMIN — SODIUM CHLORIDE, PRESERVATIVE FREE 10 ML: 5 INJECTION INTRAVENOUS at 06:07

## 2022-12-15 RX ADMIN — DOXYCYCLINE 100 MG: 100 INJECTION, POWDER, LYOPHILIZED, FOR SOLUTION INTRAVENOUS at 22:49

## 2022-12-15 RX ADMIN — FERROUS SULFATE TAB 325 MG (65 MG ELEMENTAL FE) 325 MG: 325 (65 FE) TAB at 08:37

## 2022-12-15 RX ADMIN — METHYLPREDNISOLONE SODIUM SUCCINATE 60 MG: 125 INJECTION, POWDER, FOR SOLUTION INTRAMUSCULAR; INTRAVENOUS at 00:27

## 2022-12-15 RX ADMIN — METHYLPREDNISOLONE SODIUM SUCCINATE 60 MG: 125 INJECTION, POWDER, FOR SOLUTION INTRAMUSCULAR; INTRAVENOUS at 06:06

## 2022-12-15 NOTE — ED NOTES
Report received from morning nurse, patient observed resting, not in any acute obvious resp distress, on high flow oxygen waiting for revaluation

## 2022-12-15 NOTE — PROGRESS NOTES
Care Management    Reason for Admission: Acute exacerbation of COPD     Chart reviewed. Per H&P: \"Dre Velasquez is a 76 y.o. male with history of chronic kidney disease, stage III, duodenal ulcers, chronic anemia, chronic iron deficiency, chronic respiratory failure due to chronic obstructive pulmonary disease on home oxygen, chronic diastolic heart failure, history of deep venous thrombosis, hypertension, paroxysmal atrial fibrillation, prostate cancer scented to the emergency room via EMS complaining of respiratory distress with onset earlier this afternoon. Complaining of shortness of breath and cough. Denies fever, chills, nausea vomiting, diarrhea, chest pain. In route to the emergency room EMS gave patient 2 duo nebs. Once he got in the emergency room he received several nebulizer treatments, loading dose of IV Solu-Medrol, IV magnesium and was put on high flow nasal cannula oxygen at time of admission ABG have been ordered and was pending. RUR Score: 24%       PCP: First and Last name: Katie Yanes MD   Name of Practice:   Are you a current patient: Yes/No:   Approximate date of last visit:    Can you do a virtual visit with your PCP:     Prior to admission patient was living: with spouse    Prior to admission patient was using the following DME: walker, cane but says \"I walk freely\"                    Current Advanced Directive/Advance Care Plan: Full Code    Healthcare Decision Maker:   Primary Decision MakerPhemarcus Blackwell - [phone]    Secondary Decision Maker:  Otilia Srinivasan - Daughter - [phone]  Click here to complete [address] including selection of the Healthcare Decision Maker Relationship (ie \"Primary\")                          Payor Source Payor: [place] MEDICARE / Plan: Radha Rodriguez / Product Type: Medicare /     Plan for utilizing home health: possible HH versus none                    Transition of Care Plan:  Anticipate home [facility] versus home with family assistance depending upon clinical progression of therapy recommendations         CM spoke with patient at bedside. Patient lives with spouse and states he uses 2L [place]   at home. Patient states he has a walker and cane at home. Patient verified his PCP and facesheet. Care Management Interventions  PCP Verified by CM: Yes  Mode of Transport at Discharge:  Other (see comment) (family to drive)  Transition of Care Consult (CM Consult): Discharge Planning  Discharge Durable Medical Equipment: No  Health Maintenance Reviewed: Yes  Physical Therapy Consult: Yes  Occupational Therapy Consult: Yes  Support Systems: Spouse/Significant Other  Confirm Follow Up Transport: Family  The Plan for Transition of Care is Related to the Following Treatment Goals : home with [facility] versus home with family assistance  The Patient and/or Patient Representative was Provided with a Choice of Provider and Agrees with the Discharge Plan?: Yes  Discharge Location  Patient Expects to be Discharged to[de-identified] Home with home health

## 2022-12-15 NOTE — PROGRESS NOTES
Hospitalist Progress Note    Patient: Oswaldo Villanueva MRN: [id number]  CSN: [id number]    YOB: 1945  Age: 76 y.o. Sex: male    DOA: 12/14/2022 LOS:  LOS: 1 day                Assessment/Plan     Patient Active Problem List   Diagnosis Code    Hypertension I10    Chronic obstructive pulmonary disease (HCC) J44.9    Chronic renal disease, stage 3, moderately decreased glomerular filtration rate between 30-59 mL/min/1.73 square meter ([facility]) N18.30    Age-related physical debility R54    Acute on chronic respiratory failure with hypoxemia ([facility]) J96.21    PAF (paroxysmal atrial fibrillation) ([facility]) I48.0    Avascular necrosis of bone of left hip ([facility]) M87.052    Anemia D64.9    COPD with acute exacerbation ([facility]) J44.1    Acute exacerbation of chronic obstructive pulmonary disease (COPD) ([facility] [address] J44.1    Loculated pleural effusion J90    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 male with history of chronic kidney disease, stage III, duodenal ulcers, chronic anemia, chronic iron deficiency, chronic respiratory failure due to chronic obstructive pulmonary disease on home oxygen, chronic diastolic heart failure, history of deep venous thrombosis, hypertension, paroxysmal atrial fibrillation, prostate cancer presented to the emergency room via EMS complaining of respiratory distress     Acute on chronic respiratory failure-   2nd to COPD exac. On NC O2. COPD with acute exacerbation-   on Brovana and Pulmicort nebulizers  Solumedrol  Neb treatment as needed     Chronic loculated right pleural effusion -   on doxycycline  On Lasix     Paroxysmal atrial fib  -   rate controlled. on Eliquis.      Chronic kidney disease -   Avoid nephrotoxic agents trend BMP Hard of hearing With early dementia -   high risks for fall     PT and OT consult    Disposition : 1-2 days    Review of systems  General: No fevers or chills. Cardiovascular: No chest pain or pressure. No palpitations. Pulmonary: No shortness of breath. Gastrointestinal: No nausea, vomiting. Physical Exam:  General: Awake, cooperative, no acute distress    HEENT: NC, Atraumatic. PERRLA, anicteric sclerae. Lungs: Exp wheezes bilaterally. Heart:  S1 S2,  No murmur, No Rubs, No Gallops  Abdomen: Soft, Non distended, Non tender. +Bowel sounds,   Extremities: No c/c/e  Psych:   Not anxious or agitated. Neurologic:  No acute neurological deficit. Vital signs/Intake and Output:  Visit Vitals  /73   Pulse 71   Temp 98.2 °F (36.8 °C)   Resp 19   Ht 5' 8\" (1.727 m)   Wt 73.5 kg (162 lb)   SpO2 100%   BMI 24.63 kg/m²     Current Shift:  12/15 0701 - 12/15 1900  In: -   Out: 600 [Urine:600]  Last three shifts:  12/13 1901 - 12/15 0700  In: 250 [P.O.:100; I.V.:150]  Out: 300 [Urine:300]            Labs: Results:       Chemistry Recent Labs     12/15/22  0345 12/14/22  1712   * 112*    141   K 4.5 4.1    109   CO2 23 25   BUN 26* 25*   CREA 1.36* 1.44*   CA 8.7 8.5   AGAP 6 7   BUCR 19 17   AP  --  136*   TP  --  6.9   ALB  --  3.5   GLOB  --  3.4   AGRAT  --  1.0      CBC w/Diff Recent Labs     12/15/22  0345 12/14/22  1712   WBC 8.5 11.7   RBC 3.55* 3.91*   HGB 9.9* 10.9*   HCT 31.4* 34.2*    333   GRANS  --  57   LYMPH  --  15*   EOS  --  19*      Cardiac Enzymes No results for input(s): CPK, CKND1, WILLARD in the last 72 hours. No lab exists for component: CKRMB, TROIP   Coagulation No results for input(s): PTP, INR, APTT, INREXT, INREXT in the last 72 hours.     Lipid Panel Lab Results   Component Value Date/Time    Cholesterol, total 172 04/02/2022 03:23 AM    HDL Cholesterol 39 (L) 04/02/2022 03:23 AM    LDL, calculated 109.2 (H) 04/02/2022 03:23 AM    VLDL, calculated 23.8 04/02/2022 03:23 AM    Triglyceride 119 04/02/2022 03:23 AM    CHOL/HDL Ratio 4.4 04/02/2022 03:23 AM      BNP No results for input(s): BNPP in the last 72 hours.    Liver Enzymes Recent Labs     12/14/22  1712   TP 6.9   ALB 3.5   *      Thyroid Studies Lab Results   Component Value Date/Time    TSH 3.06 04/05/2021 01:30 PM        Procedures/imaging: see electronic medical records for all procedures/Xrays and details which were not copied into this note but were reviewed prior to creation of Plan

## 2022-12-15 NOTE — H&P
History & Physical    Patient: Dickson Arthur MRN: [id number]  CSN: [id number]    YOB: 1945  Age: 76 y.o.   Sex: male      DOA: 12/14/2022  Primary Care Provider:  Irena Wilkerson MD      Assessment/Plan     Acute on chronic COPD exacerbation -  Duo-nebs q4hrs prn   Pulmicort nebs q12   Solumedrol 60mg IV q6hrs   02 supplementation, started on HFNC, wean as tolerated   Doxycycline 100mg IV q12  Brovana 15mcg bid   CXR basically unremarkable   ABG ordered in ED, not completed , will re-ordered   ADA, SSI, FSBG qac and qhs monitoring while on IV steroids     Acute on chronic respiratory failure due to COPD on home oxygen -  HFNC started in the ED  Wean as appropriate     History of chronic kidney disease, stage III -  Scr 1.36  Trend BMP  Avoid nephrotoxins     Chronic iron deficiency anemia -  Hgb on admission 9.9  Trend daily CBC  Resume home iron supplementation, ferrous sulfate 325 mg twice daily with meals    Chronic diastolic heart failure -  No current exacerbation  Resume Lasix 20 mg daily    History of deep venous thrombosis and PE -  Resume eliquis 5mg po every day     Hypertension -  Monitor BP  Resume home antihypertensives as appropriate     Chronic paroxysmal atrial fibrillation -  No current acute issues     Prostate cancer -  Supportive care     DVT prophylaxis covered by Eliquis   GI prophylaxis covered by Pepcid       Patient Active Problem List   Diagnosis Code    Hypertension I10    Chronic obstructive pulmonary disease ([facility]) J44.9    Chronic renal disease, stage 3, moderately decreased glomerular filtration rate between 30-59 mL/min/1.73 square meter ([facility]) N18.30    Age-related physical debility R54    Acute on chronic respiratory failure with hypoxemia ([facility]) J96.21    PAF (paroxysmal atrial fibrillation) ([facility]) I48.0    Avascular necrosis of bone of left hip ([facility]) M87.052    Anemia D64.9    COPD with acute exacerbation ([facility]) J44.1    Acute exacerbation of chronic obstructive pulmonary disease (COPD) ([facility] [address] J44.1    Loculated pleural effusion J90    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 male with history of chronic kidney disease, stage III, duodenal ulcers, chronic anemia, chronic iron deficiency, chronic respiratory failure due to chronic obstructive pulmonary disease on home oxygen, chronic diastolic heart failure, history of deep venous thrombosis, hypertension, paroxysmal atrial fibrillation, prostate cancer scented to the emergency room via EMS complaining of respiratory distress with onset earlier this afternoon. Complaining of shortness of breath and cough. Denies fever, chills, nausea vomiting, diarrhea, chest pain. In route to the emergency room EMS gave patient 2 duo nebs. Once he got in the emergency room he received several nebulizer treatments, loading dose of IV Solu-Medrol, IV magnesium and was put on high flow nasal cannula oxygen at time of admission ABG have been ordered and was pending.      Past Medical History:   Diagnosis Date    BMI 30.0-30.9,adult 4/2/2022    Brain aneurysm     Brain aneurysm     Cancer [facility])     prostate    CHF (congestive heart failure) ([facility])     CKD (chronic kidney disease)     Closed nondisplaced supracondylar fracture of lower end of left femur without intracondylar extension with delayed healing     COPD (chronic obstructive pulmonary disease) ([facility] [address]     Debility     History of home oxygen therapy     [place] (hard of hearing)     Hypertension     Nocturia     On home oxygen therapy PAF (paroxysmal atrial fibrillation) (HCC)     Pneumonia     Prostate CA ([facility] [address]     Prostate neoplasm     Radiation effect        Past Surgical History:   Procedure Laterality Date    HX HERNIA REPAIR      HX HIP ARTHROSCOPY  04/09/2021       Family History   Problem Relation Age of Onset    Heart Disease Mother        Social History     Socioeconomic History    Marital status:    Tobacco Use    Smoking status: Former     Types: Cigarettes    Smokeless tobacco: Never   Substance and Sexual Activity    Alcohol use: No    Drug use: Never    Sexual activity: Never       Prior to Admission medications    Medication Sig Start Date End Date Taking? Authorizing Provider   albuterol (PROVENTIL VENTOLIN) 2.5 mg /3 mL (0.083 %) nebu 3 mL by Nebulization route every four (4) hours as needed for Wheezing, Shortness of Breath or Cough. 11/28/22   Samara Sprague, DO   albuterol-ipratropium (DUO-NEB) 2.5 mg-0.5 mg/3 ml nebu 3 mL by Nebulization route every four (4) hours as needed for Wheezing. 10/14/22   Aleksander Pizano, DO   albuterol (PROVENTIL HFA, VENTOLIN HFA, PROAIR HFA) 90 mcg/actuation inhaler Take 2 Puffs by inhalation every four (4) hours as needed for Wheezing or Shortness of Breath. 5/13/22   Oliva Yen MD   DAPTOMYCIN  mg by IntraVENous route daily. in 25mL NS    Provider, Historical   sodium chloride (Normal Saline Flush) 5-10 mL by IntraVENous route daily. flush IV catheter with 10ml before and after infusing each dose of medication as directed. Provider, Historical   heparin sodium,porcine (HEPARIN LOCK FLUSH IV) 3 mL by IntraVENous route daily. flush IV catheter with 3ml of heparin 10units/mL after the last dose of 0.9% sodium chloride flush, after each dose of medication or as directed. flush IV catheter every day if not in use. Provider, Historical   ferrous sulfate 325 mg (65 mg iron) tablet Take 1 Tablet by mouth two (2) times daily (with meals).  4/15/22   Peri Cheung MD apixaban (ELIQUIS) 5 mg tablet Take 1 Tablet by mouth two (2) times a day. 4/15/22   Brooke Ramsey MD   tamsulosin [facility]) 0.4 mg capsule Take 1 Capsule by mouth every evening. 4/15/22   Brooke Ramsey MD   furosemide (Lasix) 20 mg tablet Take 1 Tablet by mouth daily. 4/15/22   Brooke Ramsey MD   amLODIPine (NORVASC) 10 mg tablet Take 1 Tablet by mouth daily. 4/16/22   Brooke Ramsey MD   pantoprazole (PROTONIX) 40 mg tablet Take 1 Tablet by mouth Before breakfast and dinner. 4/15/22   Brooke Ramsey MD   OXYGEN-AIR DELIVERY SYSTEMS 2 L/min by Nasal route daily as needed for PRN Reason (Other) (SOB/wheezing). Provider, Historical   dextran 70/hypromellose (ARTIFICIAL TEARS, PF, OP) Administer 2 Drops to both eyes daily as needed for Other (dry eyes). Provider, Historical       Allergies   Allergen Reactions    Aspirin Other (comments)     \"messes my stomach up\"    Bactrim [Sulfamethoxazole-Trimethoprim] Unknown (comments)    Nsaids (Non-Steroidal Anti-Inflammatory Drug) Unknown (comments)       Review of Systems  Gen: No fever, chills, malaise, weight loss/gain. Heent: No headache, rhinorrhea, epistaxis, ear pain, hearing loss, sinus pain, neck pain/stiffness, sore throat. Heart: No chest pain, palpitations, KUMAR, pnd, or orthopnea. Resp: +cough, hemoptysis, +wheezing and shortness of breath. GI: No nausea, vomiting, diarrhea, constipation, melena or hematochezia. : No urinary obstruction, dysuria or hematuria. Derm: No rash, new skin lesion or pruritis. Musc/skeletal: no bone or joint complains. Vasc: No edema, cyanosis or claudication. Endo: No heat/cold intolerance, no polyuria,polydipsia or polyphagia. Neuro: No unilateral weakness, numbness, tingling. No seizures. Heme: No easy bruising or bleeding.       Physical Exam:     Physical Exam:  Visit Vitals  BP (!) 144/63 (BP 1 Location: Left upper arm, BP Patient Position: At rest)   Pulse 78   Temp 97.9 °F (36.6 °C)   Resp 17   Ht 5' 8\" (1.727 m)   Wt 73.5 kg (162 lb)   SpO2 100%   BMI 24.63 kg/m²    O2 Flow Rate (L/min): 6 l/min O2 Device: Nasal cannula    Temp (24hrs), Av.1 °F (36.7 °C), Min:97.9 °F (36.6 °C), Max:98.5 °F (36.9 °C)    1901 - 12/15 0700  In: 250 [P.O.:100; I.V.:150]  Out: 300 [Urine:300]   No intake/output data recorded. General:  Awake, cooperative, no distress, thin, elderly, WM   Head:  Normocephalic, without obvious abnormality, atraumatic. Eyes:  Conjunctivae/corneas clear, sclera anicteric, PERRL, EOMs intact. Nose: Nares normal. No drainage or sinus tenderness. Throat: Lips, mucosa, and tongue normal.    Neck: Supple, symmetrical, trachea midline, no adenopathy. Lungs:   Clear to auscultation bilaterally. Heart:  Regular rate and rhythm, S1, S2 normal, no murmur, click, rub or gallop. Abdomen: Soft, non-tender. Bowel sounds normal. No masses,  No organomegaly. Extremities: Extremities normal, atraumatic, no cyanosis or edema. Capillary refill normal.   Pulses: 2+ and symmetric all extremities. Skin: Skin color pink, turgor normal. No rashes or lesions   Neurologic: CNII-XII intact. No focal motor or sensory deficit. Labs Reviewed:    Lab results reviewed. For significant abnormal values and values requiring intervention, see assessment and plan.   Recent Results (from the past 24 hour(s))   CBC WITH AUTOMATED DIFF    Collection Time: 22  5:12 PM   Result Value Ref Range    WBC 11.7 4.6 - 13.2 K/uL    RBC 3.91 (L) 4.35 - 5.65 M/uL    HGB 10.9 (L) 13.0 - 16.0 g/dL    HCT 34.2 (L) 36.0 - 48.0 %    MCV 87.5 78.0 - 100.0 FL    MCH 27.9 24.0 - 34.0 PG    MCHC 31.9 31.0 - 37.0 g/dL    RDW 14.5 11.6 - 14.5 %    PLATELET 411 399 - 633 K/uL    MPV 8.9 (L) 9.2 - 11.8 FL    NRBC 0.0 0  WBC    ABSOLUTE NRBC 0.00 0.00 - 0.01 K/uL    NEUTROPHILS 57 40 - 73 %    LYMPHOCYTES 15 (L) 21 - 52 %    MONOCYTES 8 3 - 10 %    EOSINOPHILS 19 (H) 0 - 5 %    BASOPHILS 1 0 - 2 %    IMMATURE GRANULOCYTES 0 %    ABS. NEUTROPHILS 6.7 1.8 - 8.0 K/UL    ABS. LYMPHOCYTES 1.8 0.9 - 3.6 K/UL    ABS. MONOCYTES 0.9 0.05 - 1.2 K/UL    ABS. EOSINOPHILS 2.2 (H) 0.0 - 0.4 K/UL    ABS. BASOPHILS 0.1 0.0 - 0.1 K/UL    ABS. IMM. GRANS. 0.0 K/UL    DF MANUAL      RBC COMMENTS OVALOCYTES  FEW       METABOLIC PANEL, COMPREHENSIVE    Collection Time: 12/14/22  5:12 PM   Result Value Ref Range    Sodium 141 136 - 145 mmol/L    Potassium 4.1 3.5 - 5.5 mmol/L    Chloride 109 100 - 111 mmol/L    CO2 25 21 - 32 mmol/L    Anion gap 7 3.0 - 18 mmol/L    Glucose 112 (H) 74 - 99 mg/dL    BUN 25 (H) 7.0 - 18 MG/DL    Creatinine 1.44 (H) 0.6 - 1.3 MG/DL    BUN/Creatinine ratio 17 12 - 20      eGFR 49 (L) >60 ml/min/1.73m2    Calcium 8.5 8.5 - 10.1 MG/DL    Bilirubin, total 0.4 0.2 - 1.0 MG/DL    ALT (SGPT) 13 (L) 16 - 61 U/L    AST (SGOT) 11 10 - 38 U/L    Alk.  phosphatase 136 (H) 45 - 117 U/L    Protein, total 6.9 6.4 - 8.2 g/dL    Albumin 3.5 3.4 - 5.0 g/dL    Globulin 3.4 2.0 - 4.0 g/dL    A-G Ratio 1.0 0.8 - 1.7     MAGNESIUM    Collection Time: 12/14/22  5:12 PM   Result Value Ref Range    Magnesium 2.1 1.6 - 2.6 mg/dL   TROPONIN-HIGH SENSITIVITY    Collection Time: 12/14/22  5:12 PM   Result Value Ref Range    Troponin-High Sensitivity 7 0 - 78 ng/L   NT-PRO BNP    Collection Time: 12/14/22  5:12 PM   Result Value Ref Range    NT pro- 0 - 1,800 PG/ML   COVID-19 WITH INFLUENZA A/B    Collection Time: 12/14/22  5:30 PM   Result Value Ref Range    SARS-CoV-2 by PCR Not detected NOTD      Influenza A by PCR Not detected NOTD      Influenza B by PCR Not detected NOTD     METABOLIC PANEL, BASIC    Collection Time: 12/15/22  3:45 AM   Result Value Ref Range    Sodium 137 136 - 145 mmol/L    Potassium 4.5 3.5 - 5.5 mmol/L    Chloride 108 100 - 111 mmol/L    CO2 23 21 - 32 mmol/L    Anion gap 6 3.0 - 18 mmol/L    Glucose 160 (H) 74 - 99 mg/dL    BUN 26 (H) 7.0 - 18 MG/DL    Creatinine 1.36 (H) 0.6 - 1.3 MG/DL    BUN/Creatinine ratio 19 12 - 20      eGFR 53 (L) >60 ml/min/1.73m2    Calcium 8.7 8.5 - 10.1 MG/DL   CBC W/O DIFF    Collection Time: 12/15/22  3:45 AM   Result Value Ref Range    WBC 8.5 4.6 - 13.2 K/uL    RBC 3.55 (L) 4.35 - 5.65 M/uL    HGB 9.9 (L) 13.0 - 16.0 g/dL    HCT 31.4 (L) 36.0 - 48.0 %    MCV 88.5 78.0 - 100.0 FL    MCH 27.9 24.0 - 34.0 PG    MCHC 31.5 31.0 - 37.0 g/dL    RDW 14.0 11.6 - 14.5 %    PLATELET 244 340 - 931 K/uL    MPV 9.2 9.2 - 11.8 FL    NRBC 0.0 0  WBC    ABSOLUTE NRBC 0.00 0.00 - 0.01 K/uL       Procedures/imaging: see electronic medical records for all procedures/Xrays and details which were not copied into this note but were reviewed prior to creation of Plan        CC: Yogesh Downey MD

## 2022-12-15 NOTE — PROGRESS NOTES
Pt admitted from ED. 6L of 02 via NC. V/S WNL. Lung sounds course upper lobe. Left AC and R wrist PIV infiltrated and unable to flushed. Attempt x 2 to start new IV, unsuccessful. Medic paged. Skin assessment complete, redness noted to buttocks. Froy Dimmer applied. Pt very hard of hearing. Oriented to room and call bell. Bed alarm placed on patient. All needs met at this time.

## 2022-12-16 LAB
ANION GAP SERPL CALC-SCNC: 11 MMOL/L (ref 3–18)
BUN SERPL-MCNC: 43 MG/DL (ref 7–18)
BUN/CREAT SERPL: 28 (ref 12–20)
CALCIUM SERPL-MCNC: 8.3 MG/DL (ref 8.5–10.1)
CHLORIDE SERPL-SCNC: 103 MMOL/L (ref 100–111)
CO2 SERPL-SCNC: 23 MMOL/L (ref 21–32)
CREAT SERPL-MCNC: 1.54 MG/DL (ref 0.6–1.3)
ERYTHROCYTE [DISTWIDTH] IN BLOOD BY AUTOMATED COUNT: 14 % (ref 11.6–14.5)
GLUCOSE SERPL-MCNC: 158 MG/DL (ref 74–99)
HCT VFR BLD AUTO: 28.3 % (ref 36–48)
HGB BLD-MCNC: 8.9 G/DL (ref 13–16)
MCH RBC QN AUTO: 27.9 PG (ref 24–34)
MCHC RBC AUTO-ENTMCNC: 31.4 G/DL (ref 31–37)
MCV RBC AUTO: 88.7 FL (ref 78–100)
NRBC # BLD: 0 K/UL (ref 0–0.01)
NRBC BLD-RTO: 0 PER 100 WBC
PLATELET # BLD AUTO: 261 K/UL (ref 135–420)
PMV BLD AUTO: 9.2 FL (ref 9.2–11.8)
POTASSIUM SERPL-SCNC: 4.5 MMOL/L (ref 3.5–5.5)
RBC # BLD AUTO: 3.19 M/UL (ref 4.35–5.65)
SODIUM SERPL-SCNC: 137 MMOL/L (ref 136–145)
WBC # BLD AUTO: 10.3 K/UL (ref 4.6–13.2)

## 2022-12-16 PROCEDURE — 74011000258 HC RX REV CODE- 258: Performed by: FAMILY MEDICINE

## 2022-12-16 PROCEDURE — 74011000250 HC RX REV CODE- 250: Performed by: FAMILY MEDICINE

## 2022-12-16 PROCEDURE — 65270000046 HC RM TELEMETRY

## 2022-12-16 PROCEDURE — 80048 BASIC METABOLIC PNL TOTAL CA: CPT

## 2022-12-16 PROCEDURE — 36415 COLL VENOUS BLD VENIPUNCTURE: CPT

## 2022-12-16 PROCEDURE — 74011250637 HC RX REV CODE- 250/637: Performed by: FAMILY MEDICINE

## 2022-12-16 PROCEDURE — 74011250636 HC RX REV CODE- 250/636: Performed by: FAMILY MEDICINE

## 2022-12-16 PROCEDURE — 85027 COMPLETE CBC AUTOMATED: CPT

## 2022-12-16 PROCEDURE — 94640 AIRWAY INHALATION TREATMENT: CPT

## 2022-12-16 RX ADMIN — METHYLPREDNISOLONE SODIUM SUCCINATE 60 MG: 125 INJECTION, POWDER, FOR SOLUTION INTRAMUSCULAR; INTRAVENOUS at 12:57

## 2022-12-16 RX ADMIN — SODIUM CHLORIDE, PRESERVATIVE FREE 10 ML: 5 INJECTION INTRAVENOUS at 21:40

## 2022-12-16 RX ADMIN — FERROUS SULFATE TAB 325 MG (65 MG ELEMENTAL FE) 325 MG: 325 (65 FE) TAB at 17:58

## 2022-12-16 RX ADMIN — ARFORMOTEROL TARTRATE 15 MCG: 15 SOLUTION RESPIRATORY (INHALATION) at 20:54

## 2022-12-16 RX ADMIN — TAMSULOSIN HYDROCHLORIDE 0.4 MG: 0.4 CAPSULE ORAL at 08:24

## 2022-12-16 RX ADMIN — DOXYCYCLINE 100 MG: 100 INJECTION, POWDER, LYOPHILIZED, FOR SOLUTION INTRAVENOUS at 21:39

## 2022-12-16 RX ADMIN — BUDESONIDE 500 MCG: 0.5 INHALANT RESPIRATORY (INHALATION) at 20:54

## 2022-12-16 RX ADMIN — METHYLPREDNISOLONE SODIUM SUCCINATE 60 MG: 125 INJECTION, POWDER, FOR SOLUTION INTRAMUSCULAR; INTRAVENOUS at 06:45

## 2022-12-16 RX ADMIN — METHYLPREDNISOLONE SODIUM SUCCINATE 60 MG: 125 INJECTION, POWDER, FOR SOLUTION INTRAMUSCULAR; INTRAVENOUS at 17:56

## 2022-12-16 RX ADMIN — APIXABAN 5 MG: 5 TABLET, FILM COATED ORAL at 08:24

## 2022-12-16 RX ADMIN — DOXYCYCLINE 100 MG: 100 INJECTION, POWDER, LYOPHILIZED, FOR SOLUTION INTRAVENOUS at 08:24

## 2022-12-16 RX ADMIN — FUROSEMIDE 20 MG: 20 TABLET ORAL at 08:24

## 2022-12-16 RX ADMIN — ARFORMOTEROL TARTRATE 15 MCG: 15 SOLUTION RESPIRATORY (INHALATION) at 08:15

## 2022-12-16 RX ADMIN — BUDESONIDE 500 MCG: 0.5 INHALANT RESPIRATORY (INHALATION) at 08:15

## 2022-12-16 RX ADMIN — SODIUM CHLORIDE, PRESERVATIVE FREE 10 ML: 5 INJECTION INTRAVENOUS at 14:00

## 2022-12-16 RX ADMIN — PANTOPRAZOLE SODIUM 40 MG: 40 TABLET, DELAYED RELEASE ORAL at 06:45

## 2022-12-16 RX ADMIN — APIXABAN 5 MG: 5 TABLET, FILM COATED ORAL at 21:40

## 2022-12-16 RX ADMIN — FERROUS SULFATE TAB 325 MG (65 MG ELEMENTAL FE) 325 MG: 325 (65 FE) TAB at 08:24

## 2022-12-16 NOTE — PROGRESS NOTES
Alert and oriented x4, assessments and VS completed, pt has no reports of chest pain or shortness of breath,

## 2022-12-16 NOTE — PROGRESS NOTES
Problem: Self Care Deficits Care Plan (Adult)  Goal: *Acute Goals and Plan of Care (Insert Text)  Description: Occupational Therapy Goals  Initiated 12/15/2022 within 7 day(s). 1.  Patient will perform grooming with supervision/set-up standing at sink for 5 minutes or more. 2.  Patient will perform lower body dressing with supervision/set-up. 3.  Patient will perform toilet transfers with supervision/set-up. 4.  Patient will perform all aspects of toileting with supervision/set-up. 5.  Patient will participate in upper extremity therapeutic exercise/activities with supervision/set-up for 10 minutes. 6.  Patient will utilize energy conservation techniques during functional activities with verbal, visual, and tactile cues. OCCUPATIONAL THERAPY EVALUATION    Patient: Zeus Dubon (9 y.o. male)  Date: 12/15/2022  Primary Diagnosis: COPD exacerbation (HCC) [J44.1]  Respiratory distress [R06.03]       Precautions:   Fall  PLOF: min-CGA for ADLs and transfers     ASSESSMENT :  Based on the objective data described below, the patient presents with  decreased strength, endurance, and balance for carryover of ADLs and transfers following above mentioned medical diagnosis. Pt presented supine in bed and agrees to participate with therapy. Pt co-treat with PT for the need of another set of skilled hands and safety with transfers/ADLs. Pt performed bed mobility, supine<>sit, sit<>stand transfers with min-CGA during this session. Pt's O2 saturation maintained at 96% on 4LNC with activity. Pt was left supine in bed at the end of session in NAD. Pt's call bell and room telephone left within reach. Education: Pt education on safety with transfers, fall prevention and to call for assistance was provided. Pt verbalized understanding for the same. Patient will benefit from skilled intervention to address the above impairments.   Patient's rehabilitation potential is considered to be Good  Factors which may influence rehabilitation potential include:   [x]             None noted  []             Mental ability/status  []             Medical condition  []             Home/family situation and support systems  []             Safety awareness  []             Pain tolerance/management  []             Other:      PLAN :  Recommendations and Planned Interventions:   [x]               Self Care Training                  [x]      Therapeutic Activities  [x]               Functional Mobility Training   []      Cognitive Retraining  [x]               Therapeutic Exercises           [x]      Endurance Activities  [x]               Balance Training                    []      Neuromuscular Re-Education  []               Visual/Perceptual Training     [x]      Home Safety Training  [x]               Patient Education                   [x]      Family Training/Education  []               Other (comment):    Frequency/Duration: Patient will be followed by occupational therapy 3 times a week to address goals. Discharge Recommendations: Home Health and To Be Determined  Further Equipment Recommendations for Discharge: N/A    AMPAC: 20/24    This AMPAC score should be considered in conjunction with interdisciplinary team recommendations to determine the most appropriate discharge setting. Patient's social support, diagnosis, medical stability, and prior level of function should also be taken into consideration. SUBJECTIVE:   Patient stated  I am doing alright.     OBJECTIVE DATA SUMMARY:     Past Medical History:   Diagnosis Date    BMI 30.0-30.9,adult 4/2/2022    Brain aneurysm     Brain aneurysm     Cancer [facility])     prostate    CHF (congestive heart failure) (HCC)     CKD (chronic kidney disease)     Closed nondisplaced supracondylar fracture of lower end of left femur without intracondylar extension with delayed healing     COPD (chronic obstructive pulmonary disease) ([facility] [address]     Debility     History of home oxygen therapy     [facility] (hard of hearing)     Hypertension     Nocturia     On home oxygen therapy     PAF (paroxysmal atrial fibrillation) (HCC)     Pneumonia     Prostate CA ([facility] [address]     Prostate neoplasm     Radiation effect      Past Surgical History:   Procedure Laterality Date    HX HERNIA REPAIR      HX HIP ARTHROSCOPY  04/09/2021     Barriers to Learning/Limitations: yes;  sensory deficits-vision/hearing/speech; very [place]  Compensate with: visual, verbal, tactile, kinesthetic cues/model    Home Situation:   Home Situation  Home Environment: Private residence  # Steps to Enter: 6  Rails to Enter: Yes  Hand Rails : Bilateral  One/Two Story Residence: One story  Living Alone: No  Support Systems: Spouse/Significant Other  Patient Expects to be Discharged to[de-identified] Home with home health  Current DME Used/Available at Home: Cane, straight, Oxygen, portable, Walker, rolling, Shower chair  Tub or Shower Type: Shower  []  Right hand dominant   []  Left hand dominant    Cognitive/Behavioral Status:  Neurologic State: Alert  Orientation Level: Oriented X4  Cognition: Appropriate for age attention/concentration; Follows commands  Safety/Judgement: Fall prevention    Skin: intact  Edema: none    Vision/Perceptual:    Tracking: Able to track stimulus in all quadrants w/o difficulty    Coordination: BUE  Coordination: Within functional limits  Fine Motor Skills-Upper: Left Intact; Right Intact    Gross Motor Skills-Upper: Left Intact; Right Intact  Balance:  Sitting: Intact  Standing: Impaired; With support  Standing - Static: Fair  Standing - Dynamic : Fair  Strength: BUE  Strength: Generally decreased, functional  Tone & Sensation: BUE  Sensation: Intact  Range of Motion: BUE  AROM: Generally decreased, functional  Functional Mobility and Transfers for ADLs:  Bed Mobility:  Rolling: Contact guard assistance  Supine to Sit: Contact guard assistance  Sit to Supine: Contact guard assistance  Transfers:  Sit to Stand: Contact guard assistance  Stand to Sit: Contact guard assistance  ADL Assessment:   Feeding: Independent    Oral Facial Hygiene/Grooming: Contact guard assistance    Upper Body Dressing: Contact guard assistance    Lower Body Dressing: Contact guard assistance    Toileting: Contact guard assistance  ADL Intervention:  Cognitive Retraining  Safety/Judgement: Fall prevention  Pain:  Pain level pre-treatment: 0/10   Pain level post-treatment: 0/10   Pain Intervention(s): Medication (see MAR); Rest, Ice, Repositioning   Response to intervention: Nurse notified, See doc flow    Activity Tolerance:   Fair+   Please refer to the flowsheet for vital signs taken during this treatment. After treatment:   [] Patient left in no apparent distress sitting up in chair  [x] Patient left in no apparent distress in bed  [x] Call bell left within reach  [x] Nursing notified  [] Caregiver present  [x] Bed alarm activated    COMMUNICATION/EDUCATION:   [x] Role of Occupational Therapy in the acute care setting  [x] Home safety education was provided and the patient/caregiver indicated understanding. [x] Patient/family have participated as able in goal setting and plan of care. [x] Patient/family agree to work toward stated goals and plan of care. [] Patient understands intent and goals of therapy, but is neutral about his/her participation. [] Patient is unable to participate in goal setting and plan of care. Thank you for this referral.  Dana Bess OTR/L  Time Calculation: 20 mins    Eval Complexity: History: MEDIUM Complexity : Expanded review of history including physical, cognitive and psychosocial  history ; Examination: MEDIUM Complexity : 3-5 performance deficits relating to physical, cognitive , or psychosocial skils that result in activity limitations and / or participation restrictions; Decision Making:MEDIUM Complexity : Patient may present with comorbidities that affect occupational performnce.  Miniml to moderate modification of tasks or assistance (eg, physical or verbal ) with assesment(s) is necessary to enable patient to complete evaluation     Jonathan Yony AM-PAC® Daily Activity Inpatient Short Form (6-Clicks)*    How much HELP from another person does the patient currently need    (If the patient hasn't done an activity recently, how much help from another person do you think he/she would need if he/she tried?)   Total (Total A or Dep)   A Lot  (Mod to Max A)   A Little (Sup or Min A)   None (Mod I to I)   Putting on and taking off regular lower body clothing? [] 1 [] 2 [x] 3 [] 4   2. Bathing (including washing, rinsing,      drying)? [] 1 [] 2 [x] 3 [] 4   3. Toileting, which includes using toilet, bedpan or urinal?   [] 1 [] 2 [x] 3 [] 4   4. Putting on and taking off regular upper body clothing? [] 1 [] 2 [] 3 [x] 4   5. Taking care of personal grooming such as brushing teeth? [] 1 [] 2 [x] 3 [] 4   6. Eating meals? [] 1 [] 2 [] 3 [x] 4     Based on an AM-PAC score of **/24 and their current ADL deficits; it is recommended that the patient have 5-7 sessions per week of Occupational Therapy at d/c to increase the patient's independence. Currently, this patient demonstrates the potential endurance, and/or tolerance for 3 hours of therapy each day at d/c. Based on an AM-PAC score of **/24 and their current ADL deficits; it is recommended that the patient have 3-5 sessions per week of Occupational Therapy at d/c to increase the patient's independence. Based on an AM-PAC score of **/24 and their current ADL deficits; it is recommended that the patient have 2-3 sessions per week of Occupational Therapy at d/c to increase the patient's independence. At this time and based on an AM-PAC score of **/24, no further OT is recommended upon discharge due to (i.e. patient at baseline functional statusetc). Recommend patient returns to prior setting with prior services.

## 2022-12-16 NOTE — PROGRESS NOTES
Problem: Pressure Injury - Risk of  Goal: *Prevention of pressure injury  Description: Document Nikos Scale and appropriate interventions in the flowsheet. Outcome: Progressing Towards Goal  Note: Pressure Injury Interventions: Activity Interventions: Pressure redistribution bed/mattress(bed type)    Mobility Interventions: Pressure redistribution bed/mattress (bed type)    Nutrition Interventions: Offer support with meals,snacks and hydration    Friction and Shear Interventions: Foam dressings/transparent film/skin sealants                Problem: Falls - Risk of  Goal: *Absence of Falls  Description: Document Darell Fall Risk and appropriate interventions in the flowsheet.   Outcome: Progressing Towards Goal  Note: Fall Risk Interventions:  Mobility Interventions: Assess mobility with egress test, Utilize walker, cane, or other assistive device    Mentation Interventions: Adequate sleep, hydration, pain control, Door open when patient unattended    Medication Interventions: Patient to call before getting OOB, Teach patient to arise slowly    Elimination Interventions: Call light in reach, Patient to call for help with toileting needs

## 2022-12-16 NOTE — PROGRESS NOTES
Hospitalist Progress Note    Patient: Cleo Estrella MRN: [id number]  CSN: [id number]    YOB: 1945  Age: 76 y.o. Sex: male    DOA: 12/14/2022 LOS:  LOS: 2 days          Chief Complaint:    Chief complaint :  SOB      Assessment/Plan     76 y.o. male with history of chronic kidney disease, stage III, duodenal ulcers, chronic anemia, chronic iron deficiency, chronic respiratory failure due to chronic obstructive pulmonary disease on home oxygen, chronic diastolic heart failure, history of deep venous thrombosis, hypertension, paroxysmal atrial fibrillation, prostate cancer presented to the emergency room via EMS complaining of respiratory distress      Acute on chronic respiratory failure-   2nd to COPD exac. On NC O2. COPD with acute exacerbation-   on Brovana and Pulmicort nebulizers  Solumedrol  Neb treatment as needed     Chronic loculated right pleural effusion -   on doxycycline  On Lasix    Anemia -  Suspect chronic in nature   Has chronic renal disease, stage 3  Hgb 8.9, trend cbc, transfuse if <7     Paroxysmal atrial fib  -   rate controlled. on Eliquis.      Chronic kidney disease -   Avoid nephrotoxic agents trend BMP     Hard of hearing With early dementia -   high risks for fall     PT and OT consult     Disposition : 1-2 days    Patient Active Problem List   Diagnosis Code    Hypertension I10    Chronic obstructive pulmonary disease ([facility]) J44.9    Chronic renal disease, stage 3, moderately decreased glomerular filtration rate between 30-59 mL/min/1.73 square meter ([facility]) N18.30    Age-related physical debility R54    Acute on chronic respiratory failure with hypoxemia ([facility]) J96.21    PAF (paroxysmal atrial fibrillation) ([facility]) I48.0    Avascular necrosis of bone of left hip ([facility]) M87.052    Anemia D64.9    COPD with acute exacerbation ([facility]) J44.1    Acute exacerbation of chronic obstructive pulmonary disease (COPD) ([facility]) J44.1    Loculated pleural effusion J90    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    Bilateral deafness H91.93    Acute deep vein thrombosis (DVT) of femoral vein of right lower extremity (HCC) I82.411    BMI 30.0-30.9,adult Z68.30    On supplemental oxygen by nasal cannula Z78.9       Subjective:    No new complaints  'I'm doing better,\"    Review of systems:    Constitutional: denies fevers, chills, myalgias  Respiratory: denies SOB, cough  Cardiovascular: denies chest pain, palpitations  Gastrointestinal: denies nausea, vomiting, diarrhea      Vital signs/Intake and Output:  Visit Vitals  BP (!) 127/56 (BP 1 Location: Left upper arm, BP Patient Position: At rest;Supine)   Pulse 67   Temp 97.4 °F (36.3 °C)   Resp 20   Ht 5' 8\" (1.727 m)   Wt 75.6 kg (166 lb 9.6 oz)   SpO2 100%   BMI 25.33 kg/m²     Current Shift:  12/15 1901 - 12/16 0700  In: -   Out: 1200 [Urine:1200]  Last three shifts:  12/14 0701 - 12/15 1900  In: 250 [P.O.:100;  I.V.:150]  Out: 900 [Urine:900]    Exam:    General: Well developed, alert, NAD, OX3  Head/Neck: NCAT, supple, No masses, No lymphadenopathy  CVS:Regular rate and rhythm, no M/R/G, S1/S2 heard, no thrill  Lungs:Clear to auscultation bilaterally, no wheezes, rhonchi, or rales  Abdomen: Soft, Nontender, No distention, Normal Bowel sounds, No hepatomegaly  Extremities: No C/C/E, pulses palpable 2+  Skin:normal texture and turgor, no rashes, no lesions  Neuro:grossly normal , follows commands  Psych:appropriate                Labs: Results:       Chemistry Recent Labs     12/16/22  0135 12/15/22  0345 12/14/22  1712   * 160* 112*    137 141   K 4.5 4.5 4.1    108 109   CO2 23 23 25   BUN 43* 26* 25*   CREA 1.54* 1.36* 1.44*   CA 8.3* 8.7 8.5   AGAP 11 6 7   BUCR 28* 19 17   AP  --   --  136*   TP  --   --  6.9   ALB  --   --  3.5   GLOB  --   --  3.4   AGRAT  --   --  1.0      CBC w/Diff Recent Labs     12/16/22  0135 12/15/22  0345 12/14/22  1712   WBC 10.3 8.5 11.7   RBC 3.19* 3.55* 3.91*   HGB 8.9* 9.9* 10.9* HCT 28.3* 31.4* 34.2*    283 333   GRANS  --   --  57   LYMPH  --   --  15*   EOS  --   --  19*      Cardiac Enzymes No results for input(s): CPK, CKND1, WILLARD in the last 72 hours. No lab exists for component: CKRMB, TROIP   Coagulation No results for input(s): PTP, INR, APTT, INREXT in the last 72 hours. Lipid Panel Lab Results   Component Value Date/Time    Cholesterol, total 172 04/02/2022 03:23 AM    HDL Cholesterol 39 (L) 04/02/2022 03:23 AM    LDL, calculated 109.2 (H) 04/02/2022 03:23 AM    VLDL, calculated 23.8 04/02/2022 03:23 AM    Triglyceride 119 04/02/2022 03:23 AM    CHOL/HDL Ratio 4.4 04/02/2022 03:23 AM      BNP No results for input(s): BNPP in the last 72 hours.    Liver Enzymes Recent Labs     12/14/22  1712   TP 6.9   ALB 3.5   *      Thyroid Studies Lab Results   Component Value Date/Time    TSH 3.06 04/05/2021 01:30 PM        Procedures/imaging: see electronic medical records for all procedures/Xrays and details which were not copied into this note but were reviewed prior to creation of Amanuel Potter MD

## 2022-12-16 NOTE — PROGRESS NOTES
Bedside shift change report given to [place] RN (oncoming nurse) by Too Rascon (offgoing nurse). Report included the following information SBAR, Intake/Output, MAR, and Recent Results.

## 2022-12-16 NOTE — PROGRESS NOTES
Physician Progress Note      Brandie PATINO #:                  [id number]  :                       1945  ADMIT DATE:       2022 4:58 PM  [address] [place] DATE:  RESPONDING  PROVIDER #:        Ros Crews MD          QUERY TEXT:    Patient admitted with acute exacerbation of chronic obstructive pulmonary disease, noted to have chronic paroxysmal atrial fibrillation and is maintained on Eliquis. If possible, please document in progress notes and discharge summary if you are evaluating and/or treating any of the following: The medical record reflects the following:    Risk Factors: 68-year-old female, chronic atrial fibrillation maintained on anticoagulant, HTN, HX DVT    Clinical Indicators:  68-year-old female, chronic atrial fibrillation maintained on anticoagulant, HTN, HX DVT    Treatment: Receiving Eliquis    Sebastian Castillo RN, BSN, Cj Thomas / [address], [address] [facility] [email or web address]  Options provided:  -- Secondary hypercoagulable state in a patient with atrial fibrillation  -- Other - I will add my own diagnosis  -- Disagree - Not applicable / Not valid  -- Disagree - Clinically unable to determine / Unknown  -- Refer to Clinical Documentation Reviewer    PROVIDER RESPONSE TEXT:    This patient has secondary hypercoagulable state in a patient with atrial fibrillation. Query created by: Timothy Fernandes on 12/15/2022 9:13 AM      QUERY TEXT:    Patient admitted with acute on chronic COPD exacerbation. Noted documentation of acute on chronic respiratory failure due to COPD on home oxygen in H&P on . In order to support the diagnosis of acute respiratory failure, please include additional clinical indicators in your documentation. Or please document if the diagnosis of acute respiratory failure has been ruled out after further study.     The medical record reflects the following:    Risk Factors: Advanced age, COPD, CHF, Dependence on supplemental oxygen    Clinical Indicators:  > AbGs12/15/22 08:39  pH (POC): 7.38  pCO2 (POC): 38.3  pO2 (POC): 87  HCO3 (POC): 22.5  sO2 (POC): 96.5  Base deficit (POC): 2.4  > SpO2: 100% 98% 96% 98%  > RR 17-24  > ED review- Respiratory:  Positive for cough, chest tightness and shortness of breath. Negative for wheezing. Pulmonary:  Effort: Accessory muscle usage and respiratory distress present. Breath sounds: Decreased air movement present. Examination of the right-middle field reveals decreased breath sounds, wheezing and rales. Examination of the left-middle field reveals decreased breath sounds, wheezing and rales. Examination of the right-lower field reveals decreased breath sounds, wheezing and rales. Examination of the left-lower field reveals decreased breath sounds, wheezing and rales. > Per H&P  Acute on chronic respiratory failure due to COPD on home oxygen -  HFNC started in the ED  Wean as appropriate    Treatment: receiving supplemental oxygen, ABGs, Neb txs, Solu-Medrol    Acute Respiratory Failure Clinical Indicators per  MS-DRG Training Guide and Quick Reference Guide:  pO2 < 60 mmHg or SpO2 (pulse oximetry) < 91% breathing room air  pCO2 > 50 and pH < 7.35  P/F ratio (pO2 / FIO2) < 300  pO2 decrease or pCO2 increase by 10 mmHg from baseline (if known)  Supplemental oxygen of 40% or more  Presence of respiratory distress, tachypnea, dyspnea, shortness of breath, wheezing  Unable to speak in complete sentences  Use of accessory muscles to breathe  Extreme anxiety and feeling of impending doom  Tripod position  Confusion/altered mental status/obtunded    Guan[place]ito Nunez, RN, BSN, Jude Farnsworth / Divina Peña, [facility] [email or web address]  Options provided:  -- Acute Respiratory Failure as evidenced by (please document evidence), Please document evidence.   -- Acute on chronic respiratory failure with hypoxia as evidenced by (please document evidence), Please document evidence. -- Acute on chronic respiratory failure with hypercapnia as evidenced by (please document evidence), Please document evidence. -- Acute Respiratory Failure ruled out after study and Chronic Respiratory Failure confirmed  -- Other - I will add my own diagnosis  -- Disagree - Not applicable / Not valid  -- Disagree - Clinically unable to determine / Unknown  -- Refer to Clinical Documentation Reviewer    PROVIDER RESPONSE TEXT:    This patient is in acute on chronic respiratory failure with hypoxia as evidenced by \"presenting by EMS with acute respiratory distress He was given 2 nebulizers by EMS but was still having marked difficulty breathing upon arrival to the ED. Patient was given Solu-Medrol, DuoNeb, magnesium and placed on high flow. Patient improved with treatment and weaning was considered in the ED. \"    Query created by: Jorge Hooper on 12/15/2022 10:13 AM      Electronically signed by:  Nav Thomas MD 12/16/2022 6:38 AM

## 2022-12-16 NOTE — PROGRESS NOTES
Bedside and Verbal shift change report given to [address] [place] (oncoming nurse) by Rajan Chang (offgoing nurse). Report included the following information SBAR, Intake/Output, MAR, and Recent Results.

## 2022-12-17 VITALS
SYSTOLIC BLOOD PRESSURE: 130 MMHG | DIASTOLIC BLOOD PRESSURE: 56 MMHG | HEIGHT: 68 IN | RESPIRATION RATE: 18 BRPM | BODY MASS INDEX: 25.01 KG/M2 | WEIGHT: 165 LBS | OXYGEN SATURATION: 100 % | TEMPERATURE: 98 F | HEART RATE: 60 BPM

## 2022-12-17 LAB
ANION GAP SERPL CALC-SCNC: 9 MMOL/L (ref 3–18)
BUN SERPL-MCNC: 51 MG/DL (ref 7–18)
BUN/CREAT SERPL: 32 (ref 12–20)
CALCIUM SERPL-MCNC: 8.4 MG/DL (ref 8.5–10.1)
CHLORIDE SERPL-SCNC: 104 MMOL/L (ref 100–111)
CO2 SERPL-SCNC: 25 MMOL/L (ref 21–32)
CREAT SERPL-MCNC: 1.6 MG/DL (ref 0.6–1.3)
ERYTHROCYTE [DISTWIDTH] IN BLOOD BY AUTOMATED COUNT: 13.8 % (ref 11.6–14.5)
GLUCOSE SERPL-MCNC: 137 MG/DL (ref 74–99)
HCT VFR BLD AUTO: 29.7 % (ref 36–48)
HGB BLD-MCNC: 9.6 G/DL (ref 13–16)
MCH RBC QN AUTO: 28.3 PG (ref 24–34)
MCHC RBC AUTO-ENTMCNC: 32.3 G/DL (ref 31–37)
MCV RBC AUTO: 87.6 FL (ref 78–100)
NRBC # BLD: 0 K/UL (ref 0–0.01)
NRBC BLD-RTO: 0 PER 100 WBC
PLATELET # BLD AUTO: 251 K/UL (ref 135–420)
PMV BLD AUTO: 9.1 FL (ref 9.2–11.8)
POTASSIUM SERPL-SCNC: 4.2 MMOL/L (ref 3.5–5.5)
RBC # BLD AUTO: 3.39 M/UL (ref 4.35–5.65)
SODIUM SERPL-SCNC: 138 MMOL/L (ref 136–145)
WBC # BLD AUTO: 7.1 K/UL (ref 4.6–13.2)

## 2022-12-17 PROCEDURE — 77010033678 HC OXYGEN DAILY

## 2022-12-17 PROCEDURE — 85027 COMPLETE CBC AUTOMATED: CPT

## 2022-12-17 PROCEDURE — 74011000250 HC RX REV CODE- 250: Performed by: FAMILY MEDICINE

## 2022-12-17 PROCEDURE — 74011250637 HC RX REV CODE- 250/637: Performed by: FAMILY MEDICINE

## 2022-12-17 PROCEDURE — 97116 GAIT TRAINING THERAPY: CPT

## 2022-12-17 PROCEDURE — 65270000046 HC RM TELEMETRY

## 2022-12-17 PROCEDURE — 36415 COLL VENOUS BLD VENIPUNCTURE: CPT

## 2022-12-17 PROCEDURE — 80048 BASIC METABOLIC PNL TOTAL CA: CPT

## 2022-12-17 PROCEDURE — 74011250636 HC RX REV CODE- 250/636: Performed by: FAMILY MEDICINE

## 2022-12-17 PROCEDURE — 94640 AIRWAY INHALATION TREATMENT: CPT

## 2022-12-17 PROCEDURE — 74011000258 HC RX REV CODE- 258: Performed by: FAMILY MEDICINE

## 2022-12-17 RX ORDER — DOXYCYCLINE 100 MG/1
100 CAPSULE ORAL EVERY 12 HOURS
Status: DISCONTINUED | OUTPATIENT
Start: 2022-12-17 | End: 2022-12-18 | Stop reason: HOSPADM

## 2022-12-17 RX ADMIN — METHYLPREDNISOLONE SODIUM SUCCINATE 60 MG: 125 INJECTION, POWDER, FOR SOLUTION INTRAMUSCULAR; INTRAVENOUS at 05:00

## 2022-12-17 RX ADMIN — APIXABAN 5 MG: 5 TABLET, FILM COATED ORAL at 20:23

## 2022-12-17 RX ADMIN — BUDESONIDE 500 MCG: 0.5 INHALANT RESPIRATORY (INHALATION) at 20:37

## 2022-12-17 RX ADMIN — METHYLPREDNISOLONE SODIUM SUCCINATE 60 MG: 125 INJECTION, POWDER, FOR SOLUTION INTRAMUSCULAR; INTRAVENOUS at 13:20

## 2022-12-17 RX ADMIN — METHYLPREDNISOLONE SODIUM SUCCINATE 60 MG: 125 INJECTION, POWDER, FOR SOLUTION INTRAMUSCULAR; INTRAVENOUS at 18:05

## 2022-12-17 RX ADMIN — METHYLPREDNISOLONE SODIUM SUCCINATE 60 MG: 125 INJECTION, POWDER, FOR SOLUTION INTRAMUSCULAR; INTRAVENOUS at 00:17

## 2022-12-17 RX ADMIN — SODIUM CHLORIDE, PRESERVATIVE FREE 10 ML: 5 INJECTION INTRAVENOUS at 18:05

## 2022-12-17 RX ADMIN — FERROUS SULFATE TAB 325 MG (65 MG ELEMENTAL FE) 325 MG: 325 (65 FE) TAB at 09:40

## 2022-12-17 RX ADMIN — METHYLPREDNISOLONE SODIUM SUCCINATE 60 MG: 125 INJECTION, POWDER, FOR SOLUTION INTRAMUSCULAR; INTRAVENOUS at 23:23

## 2022-12-17 RX ADMIN — ARFORMOTEROL TARTRATE 15 MCG: 15 SOLUTION RESPIRATORY (INHALATION) at 07:21

## 2022-12-17 RX ADMIN — SODIUM CHLORIDE, PRESERVATIVE FREE 10 ML: 5 INJECTION INTRAVENOUS at 05:00

## 2022-12-17 RX ADMIN — FUROSEMIDE 20 MG: 20 TABLET ORAL at 09:40

## 2022-12-17 RX ADMIN — APIXABAN 5 MG: 5 TABLET, FILM COATED ORAL at 09:40

## 2022-12-17 RX ADMIN — PANTOPRAZOLE SODIUM 40 MG: 40 TABLET, DELAYED RELEASE ORAL at 06:14

## 2022-12-17 RX ADMIN — ARFORMOTEROL TARTRATE 15 MCG: 15 SOLUTION RESPIRATORY (INHALATION) at 20:37

## 2022-12-17 RX ADMIN — FERROUS SULFATE TAB 325 MG (65 MG ELEMENTAL FE) 325 MG: 325 (65 FE) TAB at 18:05

## 2022-12-17 RX ADMIN — TAMSULOSIN HYDROCHLORIDE 0.4 MG: 0.4 CAPSULE ORAL at 09:40

## 2022-12-17 RX ADMIN — DOXYCYCLINE 100 MG: 100 INJECTION, POWDER, LYOPHILIZED, FOR SOLUTION INTRAVENOUS at 09:40

## 2022-12-17 RX ADMIN — DOXYCYCLINE 100 MG: 100 CAPSULE ORAL at 20:23

## 2022-12-17 RX ADMIN — BUDESONIDE 500 MCG: 0.5 INHALANT RESPIRATORY (INHALATION) at 07:21

## 2022-12-17 NOTE — PROGRESS NOTES
Hospitalist Progress Note    Patient: Nicky Bumpers MRN: [id number]  CSN: [id number]    YOB: 1945  Age: 76 y.o. Sex: male    DOA: 12/14/2022 LOS:  LOS: 3 days            Assessment/Plan     Principal Problem:    Acute exacerbation of chronic obstructive pulmonary disease (COPD) ([facility] (10/5/2021)    Active Problems:    Hypertension ()      Chronic renal disease, stage 3, moderately decreased glomerular filtration rate between 30-59 mL/min/1.73 square meter (HCC) (7/20/2018)      Acute on chronic respiratory failure with hypoxemia ([facility]) (8/19/2019)      PAF (paroxysmal atrial fibrillation) ([facility] (4/5/2021)      On supplemental oxygen by nasal cannula (4/2/2022)    76 y.o. male with history of chronic kidney disease, stage III, duodenal ulcers, chronic anemia, chronic iron deficiency, chronic respiratory failure due to chronic obstructive pulmonary disease on home oxygen, chronic diastolic heart failure, history of deep venous thrombosis, hypertension, paroxysmal atrial fibrillation, prostate cancer presented to the emergency room via EMS complaining of respiratory distress      Acute on chronic respiratory failure: 2nd to COPD exac. On NC O2. COPD with acute exacerbation: on Brovana and Pulmicort nebulizers  Solumedrol  Neb treatment as needed     Chronic loculated right pleural effusion : on doxycycline  On Lasix     Anemia : Suspect chronic in nature   Has chronic renal disease, stage 3  Hgb 8.9, trend cbc, transfuse if <7     Paroxysmal atrial fib: rate controlled. on Eliquis. Chronic kidney disease: Avoid nephrotoxic agents trend BMP     Hard of hearing With early dementia -   high risks for fall     PT and OT consult     Disposition : 1-2 days    CC:   SOB, COPD exac         Subjective:     Pt was seen and examined with the nurse in the morning round. \" I need help to order my food\"       Review of systems  General: No fevers or chills. Cardiovascular: No chest pain or pressure. No palpitations. Pulmonary: + cough, SOB  Gastrointestinal: No nausea, vomiting. Objective:      Visit Vitals  BP (!) 144/73 (BP 1 Location: Right upper arm, BP Patient Position: At rest)   Pulse 69   Temp 97.7 °F (36.5 °C)   Resp 19   Ht 5' 8\" (1.727 m)   Wt 74.8 kg (165 lb)   SpO2 100%   BMI 25.09 kg/m²       Physical Exam:    Gen: NAD, non-toxic. Heent:  MMM, NC, AT. Cor: s1s2 RRR. No MRG. PMI mid 5th intercostal space. Resp:  Decrease BS b/l  Abd:  NT ND.  BS positive. No rebound or guarding. No masses. Ext: No edema or cyanosis. Intake and Output:  Current Shift:  12/17 0701 - 12/17 1900  In: 240 [P.O.:240]  Out: -   Last three shifts:  12/15 1901 - 12/17 0700  In: -   Out: 3550 [[id number]]    Labs: Results:       Chemistry Recent Labs     12/17/22  0655 12/16/22  0135 12/15/22  0345   * 158* 160*    137 137   K 4.2 4.5 4.5    103 108   CO2 25 23 23   BUN 51* 43* 26*   CREA 1.60* 1.54* 1.36*   CA 8.4* 8.3* 8.7   AGAP 9 11 6   BUCR 32* 28* 19      CBC w/Diff Recent Labs     12/17/22  0655 12/16/22  0135 12/15/22  0345   WBC 7.1 10.3 8.5   RBC 3.39* 3.19* 3.55*   HGB 9.6* 8.9* 9.9*   HCT 29.7* 28.3* 31.4*    261 283      Cardiac Enzymes No results for input(s): CPK, CKND1, WILLARD in the last 72 hours. No lab exists for component: CKRMB, TROIP   Coagulation No results for input(s): PTP, INR, APTT, INREXT, INREXT in the last 72 hours. Lipid Panel Lab Results   Component Value Date/Time    Cholesterol, total 172 04/02/2022 03:23 AM    HDL Cholesterol 39 (L) 04/02/2022 03:23 AM    LDL, calculated 109.2 (H) 04/02/2022 03:23 AM    VLDL, calculated 23.8 04/02/2022 03:23 AM    Triglyceride 119 04/02/2022 03:23 AM    CHOL/HDL Ratio 4.4 04/02/2022 03:23 AM      BNP No results for input(s): BNPP in the last 72 hours. Liver Enzymes No results for input(s): TP, ALB, TBIL, AP in the last 72 hours.     No lab exists for component: SGOT, GPT, DBIL     Thyroid Studies Lab Results Component Value Date/Time    TSH 3.06 04/05/2021 01:30 PM        Procedures/imaging: see electronic medical records for all procedures/Xrays and details which were not copied into this note but were reviewed prior to creation of Plan      Medications Reviewed  Ramirez Villela MD

## 2022-12-17 NOTE — PROGRESS NOTES
Problem: Pressure Injury - Risk of  Goal: *Prevention of pressure injury  Description: Document Nikos Scale and appropriate interventions in the flowsheet. Outcome: Progressing Towards Goal  Note: Pressure Injury Interventions: Activity Interventions: Assess need for specialty bed, Chair cushion, Increase time out of bed, Pressure redistribution bed/mattress(bed type)    Mobility Interventions: Assess need for specialty bed, Chair cushion, Float heels, Pressure redistribution bed/mattress (bed type), Turn and reposition approx. every two hours(pillow and wedges)    Nutrition Interventions: Document food/fluid/supplement intake, Discuss nutritional consult with provider, Offer support with meals,snacks and hydration    Friction and Shear Interventions: Feet elevated on foot rest, Foam dressings/transparent film/skin sealants, Lift sheet, Lift team/patient mobility team, Minimize layers, Transferring/repositioning devices                Problem: Patient Education: Go to Patient Education Activity  Goal: Patient/Family Education  Outcome: Progressing Towards Goal     Problem: Falls - Risk of  Goal: *Absence of Falls  Description: Document [place] Fall Risk and appropriate interventions in the flowsheet.   Outcome: Progressing Towards Goal  Note: Fall Risk Interventions:  Mobility Interventions: Bed/chair exit alarm, Communicate number of staff needed for ambulation/transfer, Patient to call before getting OOB, Strengthening exercises (ROM-active/passive), Utilize walker, cane, or other assistive device    Mentation Interventions: Adequate sleep, hydration, pain control, Bed/chair exit alarm, Door open when patient unattended, Evaluate medications/consider consulting pharmacy, Increase mobility, Room close to nurse's station, Update white board, Toileting rounds    Medication Interventions: Bed/chair exit alarm, Evaluate medications/consider consulting pharmacy, Patient to call before getting OOB, Teach patient to arise slowly    Elimination Interventions: Bed/chair exit alarm, Call light in reach, Elevated toilet seat, Patient to call for help with toileting needs, Stay With Me (per policy), Toilet paper/wipes in reach, Toileting schedule/hourly rounds, Urinal in reach              Problem: Patient Education: Go to Patient Education Activity  Goal: Patient/Family Education  Outcome: Progressing Towards Goal     Problem: Falls - Risk of  Goal: *Absence of Falls  Description: [address] and appropriate interventions in the flowsheet.   Outcome: Progressing Towards Goal  Note: Fall Risk Interventions:  Mobility Interventions: Bed/chair exit alarm, Communicate number of staff needed for ambulation/transfer, Patient to call before getting OOB, Strengthening exercises (ROM-active/passive), Utilize walker, cane, or other assistive device    Mentation Interventions: Adequate sleep, hydration, pain control, Bed/chair exit alarm, Door open when patient unattended, Evaluate medications/consider consulting pharmacy, Increase mobility, Room close to nurse's station, Update white board, Toileting rounds    Medication Interventions: Bed/chair exit alarm, Evaluate medications/consider consulting pharmacy, Patient to call before getting OOB, Teach patient to arise slowly    Elimination Interventions: Bed/chair exit alarm, Call light in reach, Elevated toilet seat, Patient to call for help with toileting needs, Stay With Me (per policy), Toilet paper/wipes in reach, Toileting schedule/hourly rounds, Urinal in reach              Problem: Patient Education: Go to Patient Education Activity  Goal: Patient/Family Education  Outcome: Progressing Towards Goal     Problem: Patient Education: Go to Patient Education Activity  Goal: Patient/Family Education  Outcome: Progressing Towards Goal

## 2022-12-17 NOTE — PROGRESS NOTES
Problem: Mobility Impaired (Adult and Pediatric)  Goal: *Acute Goals and Plan of Care (Insert Text)  Description: Physical Therapy Goals   Initiated 12/15/2022 and to be accomplished within 7 day(s)  1. Patient will move from supine <> sit with S in prep for out of bed activity and change of position. 2.  Patient will perform sit<> stand with S with LRAD in prep for transfers/ambulation. 3.  Patient will transfer from bed <> chair with S with LRAD for time up in chair for completion of ADL activity. 4.  Patient will ambulate 100 feet with S/LRAD for improved functional mobility at discharge. 5.  Patient will ascend/descend 3-5 stairs with handrail(s) with CGA for home re-entry as needed. Outcome: Progressing Towards Goal   []  Patient has met MD livier pereira for d/c home   []  Recommend HH with 24 hour adult care   [x]  Benefit from additional acute PT session to address:  stair training    PHYSICAL THERAPY TREATMENT    Patient: Casey Day (72 y.o. male)  Date: 12/17/2022  Diagnosis: COPD exacerbation ([facility]) [J44.1]  Respiratory distress [R06.03] Acute exacerbation of chronic obstructive pulmonary disease (COPD) ([facility]    Precautions: Fall  PLOF: ambulatory with a RW, lives with spouse, [place]    ASSESSMENT:  Pt in bed upon arrival, asking for assistance with ordering food. Called cafe and ordered pt lunch. No difficulty sitting up EOB or with sit to stand. Ambulated with RW, 140ft, steady reciprocal gt pattern, no LOB or path deviations. Returned to room and left sitting EOB. Progression toward goals:   [x]      Improving appropriately and progressing toward goals  []      Improving slowly and progressing toward goals  []      Not making progress toward goals and plan of care will be adjusted     PLAN:  Patient continues to benefit from skilled intervention to address the above impairments. Continue treatment per established plan of care.   Discharge Recommendations: Home Physical Therapy vs None  Further Equipment Recommendations for Discharge:  N/A    AMPAC: 15/20    This AMPAC score should be considered in conjunction with interdisciplinary team recommendations to determine the most appropriate discharge setting. Patient's social support, diagnosis, medical stability, and prior level of function should also be taken into consideration. SUBJECTIVE:   Patient stated I need help ordering my food.     OBJECTIVE DATA SUMMARY:   Critical Behavior:  Neurologic State: Alert  Orientation Level: Oriented X4  Cognition: Appropriate decision making, Appropriate for age attention/concentration, Appropriate safety awareness  Safety/Judgement: Fall prevention  Functional Mobility Training:  Bed Mobility:    Supine to Sit: Stand-by assistance;Contact guard assistance  Transfers:  Sit to Stand: Stand-by assistance;Contact guard assistance  Stand to Sit: Stand-by assistance  Balance:  Sitting: Intact  Standing: Intact; With support  Standing - Static: Good  Standing - Dynamic : Good   Ambulation/Gait Training:  Distance (ft): 140 Feet (ft)  Assistive Device: Gait belt;Walker, rolling  Ambulation - Level of Assistance: Stand-by assistance;Contact guard assistance  Gait Abnormalities: Decreased step clearance        Pain:  Pain level pre-treatment: 0/10  Pain level post-treatment: 0/10   Pain Intervention(s): Medication (see MAR); Rest, Ice, Repositioning   Response to intervention: Nurse notified, See doc flow    Activity Tolerance:   good  Please refer to the flowsheet for vital signs taken during this treatment. After treatment:   [] Patient left in no apparent distress sitting up in chair  [x] Patient left in no apparent distress in bed  [x] Call bell left within reach  [] Nursing notified  [] Caregiver present  [] Bed alarm activated  [] SCDs applied      COMMUNICATION/EDUCATION:   [x]         Role of Physical Therapy in the acute care setting.   [x]         Fall prevention education was provided and the patient/caregiver indicated understanding. [x]         Patient/family have participated as able in working toward goals and plan of care. [x]         Patient/family agree to work toward stated goals and plan of care. []         Patient understands intent and goals of therapy, but is neutral about his/her participation. []         Patient is unable to participate in stated goals/plan of care: ongoing with therapy staff.  []         Other:        Alysha Wright, PTA   Time Calculation: 14 mins    [employer] AM-PAC® Basic Mobility Inpatient Short Form (6-Clicks) Version 2    How much HELP from another person does the patient currently need    (If the patient hasn't done an activity recently, how much help from another person do you think he/she would need if he/she tried?)   Total (Total A or Dep)   A Lot  (Mod to Max A)   A Little (Sup or Min A)   None (Mod I to I)   Turning from your back to your side while in a flat bed without using bedrails? [] 1 [] 2 [x] 3 [] 4   2. Moving from lying on your back to sitting on the side of a flat bed without using bedrails? [] 1 [] 2 [x] 3 [] 4   3. Moving to and from a bed to a chair (including a wheelchair)? [] 1 [] 2 [x] 3 [] 4   4. Standing up from a chair using your arms (e.g., wheelchair, or bedside chair)? [] 1 [] 2 [x] 3 [] 4   5. Walking in hospital room? [] 1 [] 2 [x] 3 [] 4   6. Climbing 3-5 steps with a railing?+   [] 1 [] 2 [] 3 [] 4   +If stair climbing cannot be assessed, skip item #6. Sum responses from items 1-5. Based on an AM-PAC score of **/24 (or **/20 if omitting stairs) and their current functional mobility deficits, it is recommended that the patient have 5-7 sessions per week of Physical Therapy at d/c to increase the patient's independence. Currently, this patient demonstrates the potential endurance, and/or tolerance for 3 hours of therapy each day at d/c.     Based on an AM-PAC score of **/24 (**/20 if omitting stairs) and their current functional mobility deficits, it is recommended that the patient have 3-5 sessions per week of Physical Therapy at d/c to increase the patient's independence. Based on an AM-PAC score of **/24 (or 15/20 if omitting stairs) and their current functional mobility deficits, it is recommended that the patient have 2-3 sessions per week of Physical Therapy at d/c to increase the patient's independence. At this time and based on an AM-PAC score of **/24 (or **/20 if omitting stairs), no further PT is recommended upon discharge due to (i.e. patient at baseline functional statusetc). Recommend patient returns to prior setting with prior services.

## 2022-12-17 NOTE — PROGRESS NOTES
IV to PO Conversion Recommendation     Patient: Kya Wharton   MRN#: [id number]   Admission Date: [id number]     IV TO PO  Medication(s) Doxycycline   Oral Meds  Yes   Diet:     Active Orders   Diet    ADULT DIET Regular; Low Fat/Low Chol/High Fiber/2 gm Na; No Fluids on Tray      TEMP 97.7 °F (36.5 °C)   WBC Lab Results   Component Value Date/Time    WBC 7.1 12/17/2022 06:55 AM           Pharmacy Dosing Services:  Summary:   Does the patient meet all criteria for IV to PO switch as listed below? Yes   If no, list:    Is the patient excluded from IV to PO automatic switch based on criteria listed below? No   If yes, list:      Criteria for IV to PO switch - Antibiotics   Received IV therapy for at least 24 hours   2. Functioning GI tract   Taking scheduled oral medications  Tolerating diet more advanced than clear liquids   3. No signs/symptoms of shock  No vasopressor support    Criteria for IV to PO switch - Nonantibiotics   Functioning GI tract   Taking scheduled oral medications  Toleratind duet more advanced than clear liquids  2. No signs/symptoms of shock  No vasopressor support        3.    No seizures for >72 hours (antiepileptic medications only)    Exclusion Criteria   Patient is being treated for an infection that requires IV therapy such as: endocarditis, osteomyelitis, meningitis, pancreatitis (antibiotics only)   NG tube with continous suction   Nausea and/or vomiting or severe diarrhea within past 24 hours  Malabsorption syndromes, partial or total gastrectomy, ileus, gastric outlet or bowel obstruction  Active GI bleeding   Significant painful oral ulceration  Unable to swallow  On total parenteral nutrition with a NPO order  NPO  Febrile in last 24 hours (antibiotics only)  Clinically deteriorating or unstable (antibiotics only)      Assessment/Recommendation:    Vibramycin 100 mg po q 12 hrs for 4 more doses    This IV to PO conversion is based on the [facility] [employer] approved automatic conversion policy for eligible patients. Please call with questions.     OSBALDO [facility]  Clinical Pharmacist  [phone]

## 2022-12-17 NOTE — PROGRESS NOTES
Bedside and Verbal shift change report given to Giovana Gallagher (oncoming nurse) by Val Wong (offgoing nurse). Report included the following information SBAR and MAR.

## 2022-12-17 NOTE — PROGRESS NOTES
Problem: Pressure Injury - Risk of  Goal: *Prevention of pressure injury  Description: Document Nikos Scale and appropriate interventions in the flowsheet. Outcome: Progressing Towards Goal  Note: Pressure Injury Interventions: Activity Interventions: Increase time out of bed, PT/OT evaluation, Pressure redistribution bed/mattress(bed type)    Mobility Interventions: HOB 30 degrees or less    Nutrition Interventions: Document food/fluid/supplement intake, Offer support with meals,snacks and hydration    Friction and Shear Interventions: Lift sheet, Minimize layers                Problem: Patient Education: Go to Patient Education Activity  Goal: Patient/Family Education  Outcome: Progressing Towards Goal     Problem: Falls - Risk of  Goal: *Absence of Falls  Description: Document Darell Fall Risk and appropriate interventions in the flowsheet.   Outcome: Progressing Towards Goal  Note: Fall Risk Interventions:  Mobility Interventions: Bed/chair exit alarm, Patient to call before getting OOB, PT Consult for mobility concerns    Mentation Interventions: Adequate sleep, hydration, pain control, Bed/chair exit alarm    Medication Interventions: Evaluate medications/consider consulting pharmacy, Patient to call before getting OOB, Teach patient to arise slowly    Elimination Interventions: Bed/chair exit alarm, Call light in reach              Problem: Patient Education: Go to Patient Education Activity  Goal: Patient/Family Education  Outcome: Progressing Towards Goal     Problem: Patient Education: Go to Patient Education Activity  Goal: Patient/Family Education  Outcome: Progressing Towards Goal     Problem: Patient Education: Go to Patient Education Activity  Goal: Patient/Family Education  Outcome: Progressing Towards Goal

## 2022-12-17 NOTE — PROGRESS NOTES
Problem: Mobility Impaired (Adult and Pediatric)  Goal: *Acute Goals and Plan of Care (Insert Text)  Description: Physical Therapy Goals   Initiated 12/15/2022 and to be accomplished within 7 day(s)  1. Patient will move from supine <> sit with S in prep for out of bed activity and change of position. 2.  Patient will perform sit<> stand with S with LRAD in prep for transfers/ambulation. 3.  Patient will transfer from bed <> chair with S with LRAD for time up in chair for completion of ADL activity. 4.  Patient will ambulate 100 feet with S/LRAD for improved functional mobility at discharge. 5.  Patient will ascend/descend 3-5 stairs with handrail(s) with CGA for home re-entry as needed. Outcome: Progressing Towards Goal       PHYSICAL THERAPY EVALUATION    Patient: Kelli Aase (25 y.o. male)  Date: 12/15/2022 (late entry)  Primary Diagnosis: COPD exacerbation ([facility] [address] [J44.1]  Respiratory distress [R06.03]  Precautions: Fall, Extremely [place]  PLOF: amb with RW    ASSESSMENT :  Based on the objective data described below, the patient is seen on telemetry unit following adm for dx as above noted. Pt presents with decreased independence in functional mobility with regard to bed mobility, transfers, decreased gait quality/balance and decreased activity tolerance. Patient reports no pain. O2 sat at rest on 4 Lnc 98% pre, 96% post activity (incr'd quickly). Demonstrates transition to sit EOB, STS with RW and Gait/RW with CGA. KUMAR as usual for pt. Lisbet decr'd with reciprocal gt pattern and narrow ALEXIA. Pt returned to bed and left with all needs in reach, bed alarm engaged and nurse notified. Answered pt questions regarding PT and mobility. Recommend HHPT for follow up physical therapy upon discharge to reach maximal level of independence/safety with functional mobility.      Pt Education: Role of physical therapy in acute care setting, fall prevention and safety/technique during functional mobility tasks Patient will benefit from skilled intervention to address the above impairments. Patient's rehabilitation potential is considered to be Fair  Factors which may influence rehabilitation potential include:   []         None noted  []         Mental ability/status  [x]         Medical condition  []         Home/family situation and support systems  []         Safety awareness  []         Pain tolerance/management  []         Other:      PLAN :  Recommendations and Planned Interventions:   [x]           Bed Mobility Training             []    Neuromuscular Re-Education  [x]           Transfer Training                   []    Orthotic/Prosthetic Training  [x]           Gait Training                          []    Modalities  [x]           Therapeutic Exercises           []    Edema Management/Control  [x]           Therapeutic Activities            []    Family Training/Education  [x]           Patient Education  []           Other (comment):    Frequency/Duration: Patient will be followed by physical therapy 1-2 times per day/4-7 days per week to address goals. Discharge Recommendations: Home Physical Therapy  Further Equipment Recommendations for Discharge: N/A    AMPAC: 16/20    This AMPAC score should be considered in conjunction with interdisciplinary team recommendations to determine the most appropriate discharge setting. Patient's social support, diagnosis, medical stability, and prior level of function should also be taken into consideration. SUBJECTIVE:   Patient stated I'm here.     OBJECTIVE DATA SUMMARY:     Past Medical History:   Diagnosis Date    BMI 30.0-30.9,adult 4/2/2022    Brain aneurysm     Brain aneurysm     Cancer [facility])     prostate    CHF (congestive heart failure) (HCC)     CKD (chronic kidney disease)     Closed nondisplaced supracondylar fracture of lower end of left femur without intracondylar extension with delayed healing     COPD (chronic obstructive pulmonary disease) ([facility] Debility     History of home oxygen therapy     [place] (hard of hearing)     Hypertension     Nocturia     On home oxygen therapy     PAF (paroxysmal atrial fibrillation) (HCC)     Pneumonia     Prostate CA ([address]     Prostate neoplasm     Radiation effect      Past Surgical History:   Procedure Laterality Date    HX HERNIA REPAIR      HX HIP ARTHROSCOPY  04/09/2021     Barriers to Learning/Limitations: yes;  sensory deficits-vision/hearing/speech and physical  Compensate with: Visual Cues, Verbal Cues, and Tactile Cues  Home Situation:  Home Situation  Home Environment: Private residence  # Steps to Enter: 6  Rails to Enter: Yes  Hand Rails : Bilateral  One/Two Story Residence: One story  Living Alone: No  Support Systems: Spouse/Significant Other  Patient Expects to be Discharged to[de-identified] Home with home health  Current DME Used/Available at Home: Cane, straight, Oxygen, portable, Walker, rolling, Shower chair  Tub or Shower Type: Shower  Critical Behavior:  Neurologic State: Alert  Orientation Level: Oriented X4  Cognition: Appropriate decision making  Safety/Judgement: Fall prevention  Psychosocial  Patient Behaviors: Calm; Cooperative  Purposeful Interaction: Yes  Pt Identified Daily Priority: Clinical issues (comment)  Pedro Process: Establish trust;Teaching/learning; Attend basic human needs  Caring Interventions: Reassure  Reassure: Therapeutic listening; Informing;Caring rounds  Therapeutic Modalities: Humor; Intentional therapeutic touch  Strength:    Strength: Generally decreased, functional  Tone & Sensation:   Sensation: Intact  Range Of Motion:  AROM: Generally decreased, functional  Functional Mobility:  Bed Mobility:  Rolling: Contact guard assistance  Supine to Sit: Contact guard assistance  Sit to Supine: Contact guard assistance  Transfers:  Sit to Stand: Contact guard assistance  Stand to Sit: Contact guard assistance  Balance:   Sitting: Intact  Standing: Impaired; With support  Standing - Static: Fair  Standing - Dynamic : Fair  Ambulation/Gait Training:  Distance (ft): 36 Feet (ft)  Assistive Device: Gait belt;Walker, rolling (O2 at 4 Lnc in place)  Ambulation - Level of Assistance: Contact guard assistance  Gait Description (WDL): Exceptions to WDL  Gait Abnormalities: Decreased step clearance  Speed/Lisbet: Pace decreased (<100 feet/min)  Interventions: Safety awareness training  Pain:  Pain level pre-treatment: 0/10   Pain level post-treatment: 0/10   Pain Intervention(s) : Medication (see MAR); Rest, Ice, Repositioning  Response to intervention: Nurse notified, See doc flow  Activity Tolerance:   Fair   Please refer to the flowsheet for vital signs taken during this treatment. After treatment:   []         Patient left in no apparent distress sitting up in chair  [x]         Patient left in no apparent distress in bed  [x]         Call bell left within reach  [x]         Nursing notified  []         Caregiver present  [x]         Bed alarm activated  []         SCDs applied    COMMUNICATION/EDUCATION:   [x]         Role of Physical Therapy in the acute care setting. [x]         Fall prevention education was provided and the patient/caregiver indicated understanding. [x]         Patient/family have participated as able in goal setting and plan of care. []         Patient/family agree to work toward stated goals and plan of care. []         Patient understands intent and goals of therapy, but is neutral about his/her participation. []         Patient is unable to participate in goal setting/plan of care: ongoing with therapy staff.  []         Other:     Thank you for this referral.  Maria Teresa Raygoza, PT   Time Calculation: 20 mins      Eval Complexity: History: HIGH Complexity :3+ comorbidities / personal factors will impact the outcome/ POC Exam:MEDIUM Complexity : 3 Standardized tests and measures addressing body structure, function, activity limitation and / or participation in recreation Presentation: MEDIUM Complexity : Evolving with changing characteristics  Clinical Decision Making:Medium Complexity    Overall Complexity:MEDIUM    [address] [facility] [address] [address] AM-PAC® Basic Mobility Inpatient Short Form (6-Clicks) Version 2    How much HELP from another person does the patient currently need    (If the patient hasn't done an activity recently, how much help from another person do you think he/she would need if he/she tried?)   Total (Total A or Dep)   A Lot  (Mod to Max A)   A Little (Sup or Min A)   None (Mod I to I)   Turning from your back to your side while in a flat bed without using bedrails? [] 1 [] 2 [] 3 [x] 4   2. Moving from lying on your back to sitting on the side of a flat bed without using bedrails? [] 1 [] 2 [x] 3 [] 4   3. Moving to and from a bed to a chair (including a wheelchair)? [] 1 [] 2 [x] 3 [] 4   4. Standing up from a chair using your arms (e.g., wheelchair, or bedside chair)? [] 1 [] 2 [x] 3 [] 4   5. Walking in hospital room? [] 1 [] 2 [x] 3 [] 4   6. Climbing 3-5 steps with a railing?+   [] 1 [] 2 [] 3 [] 4   +If stair climbing cannot be assessed, skip item #6. Sum responses from items 1-5. Based on an AM-PAC score of /24 (or 16/20 if omitting stairs) and their current functional mobility deficits, it is recommended that the patient have 2-3 sessions per week of Physical Therapy at d/c to increase the patient's independence.

## 2022-12-18 ENCOUNTER — HOME HEALTH ADMISSION (OUTPATIENT)
Dept: HOME HEALTH SERVICES | Facility: HOME HEALTH | Age: 77
End: 2022-12-18

## 2022-12-18 LAB
ATRIAL RATE: 103 BPM
CALCULATED P AXIS, ECG09: 69 DEGREES
CALCULATED R AXIS, ECG10: 34 DEGREES
CALCULATED T AXIS, ECG11: 63 DEGREES
DIAGNOSIS, 93000: NORMAL
P-R INTERVAL, ECG05: 162 MS
Q-T INTERVAL, ECG07: 340 MS
QRS DURATION, ECG06: 78 MS
QTC CALCULATION (BEZET), ECG08: 445 MS
VENTRICULAR RATE, ECG03: 103 BPM

## 2022-12-18 PROCEDURE — 74011250636 HC RX REV CODE- 250/636: Performed by: FAMILY MEDICINE

## 2022-12-18 PROCEDURE — 74011000250 HC RX REV CODE- 250: Performed by: FAMILY MEDICINE

## 2022-12-18 PROCEDURE — 94640 AIRWAY INHALATION TREATMENT: CPT

## 2022-12-18 PROCEDURE — 77010033678 HC OXYGEN DAILY

## 2022-12-18 PROCEDURE — 74011250637 HC RX REV CODE- 250/637: Performed by: FAMILY MEDICINE

## 2022-12-18 RX ORDER — DOXYCYCLINE 100 MG/1
100 CAPSULE ORAL EVERY 12 HOURS
Qty: 6 CAPSULE | Refills: 0 | Status: SHIPPED | OUTPATIENT
Start: 2022-12-18 | End: 2022-12-21

## 2022-12-18 RX ADMIN — DOXYCYCLINE 100 MG: 100 CAPSULE ORAL at 09:25

## 2022-12-18 RX ADMIN — FUROSEMIDE 20 MG: 20 TABLET ORAL at 09:24

## 2022-12-18 RX ADMIN — PANTOPRAZOLE SODIUM 40 MG: 40 TABLET, DELAYED RELEASE ORAL at 06:10

## 2022-12-18 RX ADMIN — FERROUS SULFATE TAB 325 MG (65 MG ELEMENTAL FE) 325 MG: 325 (65 FE) TAB at 09:25

## 2022-12-18 RX ADMIN — TAMSULOSIN HYDROCHLORIDE 0.4 MG: 0.4 CAPSULE ORAL at 09:00

## 2022-12-18 RX ADMIN — APIXABAN 5 MG: 5 TABLET, FILM COATED ORAL at 09:25

## 2022-12-18 RX ADMIN — SODIUM CHLORIDE, PRESERVATIVE FREE 10 ML: 5 INJECTION INTRAVENOUS at 12:30

## 2022-12-18 RX ADMIN — BUDESONIDE 500 MCG: 0.5 INHALANT RESPIRATORY (INHALATION) at 07:14

## 2022-12-18 RX ADMIN — ARFORMOTEROL TARTRATE 15 MCG: 15 SOLUTION RESPIRATORY (INHALATION) at 07:14

## 2022-12-18 RX ADMIN — METHYLPREDNISOLONE SODIUM SUCCINATE 60 MG: 125 INJECTION, POWDER, FOR SOLUTION INTRAMUSCULAR; INTRAVENOUS at 12:30

## 2022-12-18 RX ADMIN — METHYLPREDNISOLONE SODIUM SUCCINATE 60 MG: 125 INJECTION, POWDER, FOR SOLUTION INTRAMUSCULAR; INTRAVENOUS at 06:10

## 2022-12-18 NOTE — DISCHARGE SUMMARY
Discharge Summary    Patient: Dickson Arthur MRN: [id number]  CSN: [id number]    YOB: 1945  Age: 76 y.o.   Sex: male    DOA: 12/14/2022 LOS:  LOS: 4 days   Discharge Date:      Primary Care Provider:  Irena Wilkerson MD    Admission Diagnoses: COPD exacerbation (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 ICD-10-CM: I82.411  ICD-9-CM: 453.41  2/3/2022 - Present        * (Principal) Acute exacerbation of chronic obstructive pulmonary disease (COPD) ([facility] ICD-10-CM: J44.1  ICD-9-CM: 491.21  10/5/2021 - Present        COPD with acute exacerbation ([facility] ICD-10-CM: J44.1  ICD-9-CM: 491.21  9/30/2021 - Present        Anemia ICD-10-CM: D64.9  ICD-9-CM: 285.9  4/11/2021 - Present        Avascular necrosis of bone of left hip ([facility] ICD-10-CM: M87.052  ICD-9-CM: 733.42  4/8/2021 - Present        PAF (paroxysmal atrial fibrillation) ([facility] ICD-10-CM: I48.0  ICD-9-CM: 427.31  4/5/2021 - Present        Acute on chronic respiratory failure with hypoxemia ([facility] ICD-10-CM: J96.21  ICD-9-CM: 518.84  8/19/2019 - Present        Age-related physical debility ICD-10-CM: R54  ICD-9-CM: 068  7/8/2019 - Present        Chronic renal disease, stage 3, moderately decreased glomerular filtration rate between 30-59 mL/min/1.73 square meter ([facility] ICD-10-CM: N18.30  ICD-9-CM: 585.3  7/20/2018 - Present        Chronic obstructive pulmonary disease ([facility] ICD-10-CM: J44.9  ICD-9-CM: 138  4/20/2018 - Present        Hypertension ICD-10-CM: I10  ICD-9-CM: 401.9  Unknown - Present        RESOLVED: Respiratory distress ICD-10-CM: R06.03  ICD-9-CM: 786.09  12/14/2022 - 12/15/2022        RESOLVED: COPD exacerbation ([facility] ICD-10-CM: J44.1  ICD-9-CM: 491.21  12/14/2022 - 12/15/2022        RESOLVED: Acute respiratory disease ICD-10-CM: J06.9  ICD-9-CM: 465.9  2/3/2022 - 2/3/2022        RESOLVED: DVT (deep venous thrombosis) ([facility] ICD-10-CM: I82.409  ICD-9-CM: 453.40  10/1/2021 - 2/3/2022        RESOLVED: Febrile illness ICD-10-CM: R50.9  ICD-9-CM: 780.60  9/30/2021 - 2/3/2022        RESOLVED: Cellulitis of left ankle ICD-10-CM: L03.116  ICD-9-CM: 682.6  8/23/2021 - 2/3/2022        RESOLVED: 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 8XXA, L08.9  ICD-9-CM: 958.3  8/20/2021 - 2/3/2022        RESOLVED: Supplemental oxygen dependent ICD-10-CM: Z99.81  ICD-9-CM: V46.2  8/20/2021 - 2/3/2022        RESOLVED: MRSA (methicillin resistant staph aureus) culture positive ICD-10-CM: Z22.322  ICD-9-CM: V02.54  8/20/2021 - 2/3/2022        RESOLVED: Pyogenic inflammation of bone ([facility] ICD-10-CM: M86.9  ICD-9-CM: 730.20  8/20/2021 - 2/3/2022        RESOLVED: Osteomyelitis of left ankle ([facility] ICD-10-CM: M86.9  ICD-9-CM: 730.27  8/20/2021 - 2/3/2022        RESOLVED: Intercondylar fracture of femur ([facility] ICD-10-CM: B91.358I  ICD-9-CM: 821.23  7/12/2021 - 2/3/2022        RESOLVED: Left leg pain ICD-10-CM: M79.605  ICD-9-CM: 729.5  7/12/2021 - 2/3/2022        RESOLVED: Open wound of left hip ICD-10-CM: T06.930S  ICD-9-CM: 890.0  5/26/2021 - 2/3/2022        RESOLVED: Scrotal edema ICD-10-CM: N50.89  ICD-9-CM: 608.86  4/23/2021 - 2/3/2022        RESOLVED: Hematoma of left hip ICD-10-CM: J04.89EW  ICD-9-CM: 924.01  4/19/2021 - 2/3/2022        RESOLVED: Cellulitis ICD-10-CM: L03.90  ICD-9-CM: 682.9  4/18/2021 - 4/20/2021        RESOLVED: Acute hip pain ICD-10-CM: M25.559  ICD-9-CM: 719.45  4/18/2021 - 2/3/2022        RESOLVED: Status post total replacement of left hip ICD-10-CM: M12.294  ICD-9-CM: V43.64  4/14/2021 - 2/3/2022        RESOLVED: Obesity ICD-10-CM: E66.9  ICD-9-CM: 278.00  4/10/2021 - 2/3/2022        RESOLVED: Acute pulmonary edema ([facility] ICD-10-CM: J81.0  ICD-9-CM: 518.4  4/10/2021 - 2/3/2022        RESOLVED: Avascular necrosis ([facility] ICD-10-CM: M87.00  ICD-9-CM: 733.40  4/8/2021 - 4/8/2021        RESOLVED: Left hip pain ICD-10-CM: M25.552  ICD-9-CM: 719.45  4/5/2021 - 2/3/2022        RESOLVED: Acute respiratory failure ([facility] ICD-10-CM: J96.00  ICD-9-CM: 518.81  2/26/2021 - 4/10/2021        RESOLVED: Respiratory distress ICD-10-CM: R06.03  ICD-9-CM: 786.09  2/2/2021 - 4/10/2021        RESOLVED: COPD exacerbation ([facility] ICD-10-CM: J44.1  ICD-9-CM: 491.21  2/2/2021 - 4/10/2021        RESOLVED: Atrial fibrillation with RVR (HCC) ICD-10-CM: I48.91  ICD-9-CM: 427.31  2/2/2021 - 4/10/2021        RESOLVED: COPD (chronic obstructive pulmonary disease) ([facility] ICD-10-CM: J44.9  ICD-9-CM: 496  8/5/2020 - 4/10/2021        RESOLVED: CAP (community acquired pneumonia) ICD-10-CM: J18.9  ICD-9-CM: 486  8/5/2020 - 4/10/2021        RESOLVED: Advanced care planning/counseling discussion ICD-10-CM: I94.59  ICD-9-CM: V65.49  Unknown - 4/10/2021        RESOLVED: Hypokalemia ICD-10-CM: E87.6  ICD-9-CM: 276.8  4/14/2020 - 4/10/2021        RESOLVED: Hyponatremia ICD-10-CM: E87.1  ICD-9-CM: 276.1  4/10/2020 - 4/10/2021        RESOLVED: COPD with acute exacerbation ([facility] ICD-10-CM: J44.1  ICD-9-CM: 491.21  4/9/2020 - 4/10/2021        RESOLVED: Acute respiratory failure with hypoxia and hypercarbia [facility]) ICD-10-CM: J96.01, J96.02  ICD-9-CM: 518.81  4/9/2020 - 4/10/2021        RESOLVED: Pleural effusion, right ICD-10-CM: J90  ICD-9-CM: 511.9  2/22/2020 - 4/10/2021        RESOLVED: Tobacco dependence ICD-10-CM: F17.200  ICD-9-CM: 305.1  2/21/2020 - 2/3/2022        RESOLVED: Hyponatremia ICD-10-CM: E87.1  ICD-9-CM: 276.1  2/21/2020 - 4/10/2021        RESOLVED: Acute on chronic respiratory failure with hypoxia and hypercapnia (HCC) ICD-10-CM: J96.21, J96.22  ICD-9-CM: 518.84, 786.09, 799.02  10/30/2019 - 2/25/2020        RESOLVED: Paroxysmal atrial fibrillation (HCC) ICD-10-CM: I48.0  ICD-9-CM: 427.31  8/19/2019 - 4/10/2021        RESOLVED: Asbestosis ([facility] ICD-10-CM: C60  ICD-9-CM: 438  10/19/2018 - 4/10/2021        RESOLVED: COPD with asthma and status asthmaticus ([facility] ICD-10-CM: J44.9, J45.902  ICD-9-CM: 493.21  7/20/2018 - 2/8/2019        RESOLVED: Status asthmaticus with COPD (chronic obstructive pulmonary disease) ([facility] ICD-10-CM: J44.9, J45.902  ICD-9-CM: 493.21  4/20/2018 - 2/8/2019        RESOLVED: PVC's (premature ventricular contractions) ICD-10-CM: I49.3  ICD-9-CM: 427.69  4/20/2018 - 2/8/2019        RESOLVED: Acute respiratory failure ([facility] ICD-10-CM: J96.00  ICD-9-CM: 518.81  10/2/2014 - 3/18/2017        RESOLVED: URIEL (acute kidney injury) ([facility] ICD-10-CM: N17.9  ICD-9-CM: 584.9  10/2/2014 - 2/8/2019        RESOLVED: Pneumonia ICD-10-CM: J18.9  ICD-9-CM: 177  10/2/2014 - 3/18/2017           Discharge Medications:     Current Discharge Medication List        START taking these medications    Details   doxycycline (MONODOX) 100 mg capsule Take 1 Capsule by mouth every twelve (12) hours for 3 days.   Qty: 6 Capsule, Refills: 0  Start date: 12/18/2022, End date: 12/21/2022           CONTINUE these medications which have NOT CHANGED    Details   albuterol (PROVENTIL VENTOLIN) 2.5 mg /3 mL (0.083 %) nebu 3 mL by Nebulization route every four (4) hours as needed for Wheezing, Shortness of Breath or Cough.  Qty: 30 Each, Refills: 1      albuterol-ipratropium (DUO-NEB) 2.5 mg-0.5 mg/3 ml nebu 3 mL by Nebulization route every four (4) hours as needed for Wheezing. Qty: 10 Each, Refills: 0      albuterol (PROVENTIL HFA, VENTOLIN HFA, PROAIR HFA) 90 mcg/actuation inhaler Take 2 Puffs by inhalation every four (4) hours as needed for Wheezing or Shortness of Breath. Qty: 1 Each, Refills: 3      sodium chloride (Normal Saline Flush) 5-10 mL by IntraVENous route daily. flush IV catheter with 10ml before and after infusing each dose of medication as directed. heparin sodium,porcine (HEPARIN LOCK FLUSH IV) 3 mL by IntraVENous route daily. flush IV catheter with 3ml of heparin 10units/mL after the last dose of 0.9% sodium chloride flush, after each dose of medication or as directed. flush IV catheter every day if not in use. ferrous sulfate 325 mg (65 mg iron) tablet Take 1 Tablet by mouth two (2) times daily (with meals). Qty: 60 Tablet, Refills: 3      apixaban (ELIQUIS) 5 mg tablet Take 1 Tablet by mouth two (2) times a day. Qty: 60 Tablet, Refills: 2      tamsulosin (FLOMAX) 0.4 mg capsule Take 1 Capsule by mouth every evening. Qty: 30 Capsule, Refills: 3      furosemide (Lasix) 20 mg tablet Take 1 Tablet by mouth daily. Qty: 30 Tablet, Refills: 3      amLODIPine (NORVASC) 10 mg tablet Take 1 Tablet by mouth daily. Qty: 30 Tablet, Refills: 3      pantoprazole (PROTONIX) 40 mg tablet Take 1 Tablet by mouth Before breakfast and dinner. Qty: 60 Tablet, Refills: 0      OXYGEN-AIR DELIVERY SYSTEMS 2 L/min by Nasal route daily as needed for PRN Reason (Other) (SOB/wheezing). dextran 70/hypromellose (ARTIFICIAL TEARS, PF, OP) Administer 2 Drops to both eyes daily as needed for Other (dry eyes).              Discharge Condition: Stable    Procedures : N/A    Consults: N/A      PHYSICAL EXAM   Visit Vitals  BP (!) 130/56   Pulse 60   Temp 98 °F (36.7 °C)   Resp 18   Ht 5' 8\" (1.727 m)   Wt 74.8 kg (165 lb)   SpO2 100%   BMI 25.09 kg/m²     General: Awake, cooperative, no acute distress    HEENT: NC, Atraumatic. PERRLA, EOMI. Anicteric sclerae. Lungs:  CTA Bilaterally. No Wheezing/Rhonchi/Rales. Heart:  Regular  rhythm,  No murmur, No Rubs, No Gallops  Abdomen: Soft, Non distended, Non tender. +Bowel sounds,   Extremities: No c/c/e  Psych:   Not anxious or agitated. Neurologic:  No acute neurological deficits. Admission HPI :   Mercedes Simpson is a 76 y.o. male with history of chronic kidney disease, stage III, duodenal ulcers, chronic anemia, chronic iron deficiency, chronic respiratory failure due to chronic obstructive pulmonary disease on home oxygen, chronic diastolic heart failure, history of deep venous thrombosis, hypertension, paroxysmal atrial fibrillation, prostate cancer scented to the emergency room via EMS complaining of respiratory distress with onset earlier this afternoon. Complaining of shortness of breath and cough. Denies fever, chills, nausea vomiting, diarrhea, chest pain. In route to the emergency room EMS gave patient 2 duo nebs. Once he got in the emergency room he received several nebulizer treatments, loading dose of IV Solu-Medrol, IV magnesium and was put on high flow nasal cannula oxygen at time of admission ABG have been ordered and was pending    Hospital Course :   76 y.o. male with history of chronic kidney disease, stage III, duodenal ulcers, chronic anemia, chronic iron deficiency, chronic respiratory failure due to chronic obstructive pulmonary disease on home oxygen, chronic diastolic heart failure, history of deep venous thrombosis, hypertension, paroxysmal atrial fibrillation, prostate cancer presented to the emergency room via EMS complaining of respiratory distress      Acute on chronic respiratory failure: 2nd to COPD exac. On NC O2 here and home with 2 L/min     COPD with acute exacerbation: Improved.  He was placed on Adelso Herminia and Pulmicort nebulizers  Solumedrol IV , Doxy. SOB improved/   Neb treatment as needed     Chronic loculated right pleural effusion : on doxycycline  On Lasix     Anemia : Suspect chronic in nature   Has chronic renal disease, stage 3. H/H      Paroxysmal atrial fib: rate controlled. on Eliquis. Chronic kidney disease: Avoid nephrotoxic agents trend BMP. Hard of hearing With early dementia: high risks for fall. Fall precaution    Activity: Activity as tolerated    Diet: Cardiac Diet    Follow-up: with PCP within 1 wk    Disposition: Home with home health    Minutes spent on discharge: 37 min      Labs: Results:       Chemistry Recent Labs     12/17/22  0655 12/16/22  0135   * 158*    137   K 4.2 4.5    103   CO2 25 23   BUN 51* 43*   CREA 1.60* 1.54*   CA 8.4* 8.3*   AGAP 9 11   BUCR 32* 28*      CBC w/Diff Recent Labs     12/17/22 0655 12/16/22 0135   WBC 7.1 10.3   RBC 3.39* 3.19*   HGB 9.6* 8.9*   HCT 29.7* 28.3*    261      Cardiac Enzymes No results for input(s): CPK, CKND1, WILLARD in the last 72 hours. No lab exists for component: CKRMB, TROIP   Coagulation No results for input(s): PTP, INR, APTT, INREXT in the last 72 hours. Lipid Panel Lab Results   Component Value Date/Time    Cholesterol, total 172 04/02/2022 03:23 AM    HDL Cholesterol 39 (L) 04/02/2022 03:23 AM    LDL, calculated 109.2 (H) 04/02/2022 03:23 AM    VLDL, calculated 23.8 04/02/2022 03:23 AM    Triglyceride 119 04/02/2022 03:23 AM    CHOL/HDL Ratio 4.4 04/02/2022 03:23 AM      BNP No results for input(s): BNPP in the last 72 hours. Liver Enzymes No results for input(s): TP, ALB, TBIL, AP in the last 72 hours.     No lab exists for component: SGOT, GPT, DBIL   Thyroid Studies Lab Results   Component Value Date/Time    TSH 3.06 04/05/2021 01:30 PM            Significant Diagnostic Studies: XR CHEST PORT    Result Date: 12/14/2022  EXAM: FRONTAL CHEST RADIOGRAPH CLINICAL INDICATION/HISTORY: Dyspnea COMPARISON: 11/28/2022 TECHNIQUE: Frontal view of the chest _______________ FINDINGS: HEART AND MEDIASTINUM: Normal cardiomediastinal silhouette. LUNGS AND PLEURAL SPACES: Right thoracic volume loss unchanged. Oval hyperdensity overlying the right midlung corresponds unchanged and related to pleural plaques. Lower right pulmonary reticulation and haziness likely atelectatic. Left lung is clear. BONY THORAX AND SOFT TISSUES: Unremarkable. _______________     Chronic right-sided pleural plaques. Decreased right lung volume and lower thoracic haziness/reticulation likely atelectasis. XR CHEST PORT    Result Date: 11/28/2022  EXAM: One-view chest CLINICAL HISTORY: copd , COMPARISON: None FINDINGS: Frontal view of the chest demonstrate pleural-based calcifications in the right lung again noted. Otherwise clear. Cardiac silhouette is normal in size and contour. No acute bony or soft tissue abnormality. No significant change or acute pulmonary process identified. No results found for this or any previous visit.            CC: Elma Logan MD

## 2022-12-18 NOTE — PROGRESS NOTES
Problem: Pressure Injury - Risk of  Goal: *Prevention of pressure injury  Description: Document Nikos Scale and appropriate interventions in the flowsheet. Outcome: Progressing Towards Goal  Note: Pressure Injury Interventions:  Sensory Interventions: Assess changes in LOC, Avoid rigorous massage over bony prominences, Chair cushion, Check visual cues for pain, Float heels, Keep linens dry and wrinkle-free, Maintain/enhance activity level, Monitor skin under medical devices, Minimize linen layers, Pad between skin to skin, Pressure redistribution bed/mattress (bed type), Turn and reposition approx. every two hours (pillows and wedges if needed)         Activity Interventions: Assess need for specialty bed, Chair cushion, Increase time out of bed, Pressure redistribution bed/mattress(bed type)    Mobility Interventions: Assess need for specialty bed, Chair cushion, Float heels, Pressure redistribution bed/mattress (bed type), Turn and reposition approx. every two hours(pillow and wedges)    Nutrition Interventions: Document food/fluid/supplement intake, Discuss nutritional consult with provider, Offer support with meals,snacks and hydration    Friction and Shear Interventions: Feet elevated on foot rest, Foam dressings/transparent film/skin sealants, Lift sheet, Lift team/patient mobility team, Minimize layers, Transferring/repositioning devices                Problem: Patient Education: Go to Patient Education Activity  Goal: Patient/Family Education  Outcome: Progressing Towards Goal     Problem: Falls - Risk of  Goal: *Absence of Falls  Description: Document Hildegarde Counts Fall Risk and appropriate interventions in the flowsheet.   Outcome: Progressing Towards Goal  Note: Fall Risk Interventions:  Mobility Interventions: Bed/chair exit alarm, Communicate number of staff needed for ambulation/transfer, Patient to call before getting OOB, Strengthening exercises (ROM-active/passive), Utilize walker, cane, or other assistive device    Mentation Interventions: Adequate sleep, hydration, pain control, Bed/chair exit alarm, Door open when patient unattended, Evaluate medications/consider consulting pharmacy, Increase mobility, More frequent rounding, Room close to nurse's station, Update white board, Toileting rounds    Medication Interventions: Bed/chair exit alarm, Evaluate medications/consider consulting pharmacy, Patient to call before getting OOB, Teach patient to arise slowly    Elimination Interventions: Bed/chair exit alarm, Call light in reach, Elevated toilet seat, Patient to call for help with toileting needs, Toilet paper/wipes in reach, Stay With Me (per policy), Toileting schedule/hourly rounds, Urinal in reach              Problem: Patient Education: Go to Patient Education Activity  Goal: Patient/Family Education  Outcome: Progressing Towards Goal     Problem: Patient Education: Go to Patient Education Activity  Goal: Patient/Family Education  Outcome: Progressing Towards Goal     Problem: Patient Education: Go to Patient Education Activity  Goal: Patient/Family Education  Outcome: Progressing Towards Goal

## 2022-12-18 NOTE — PROGRESS NOTES
Problem: Pressure Injury - Risk of  Goal: *Prevention of pressure injury  Description: Document Nikos Scale and appropriate interventions in the flowsheet. Outcome: Progressing Towards Goal  Note: Pressure Injury Interventions: Activity Interventions: Assess need for specialty bed, Chair cushion, Increase time out of bed, Pressure redistribution bed/mattress(bed type)    Mobility Interventions: Assess need for specialty bed, Chair cushion, Float heels, Pressure redistribution bed/mattress (bed type), Turn and reposition approx. every two hours(pillow and wedges)    Nutrition Interventions: Document food/fluid/supplement intake, Discuss nutritional consult with provider, Offer support with meals,snacks and hydration    Friction and Shear Interventions: Feet elevated on foot rest, Foam dressings/transparent film/skin sealants, Lift sheet, Lift team/patient mobility team, Minimize layers, Transferring/repositioning devices                Problem: Patient Education: Go to Patient Education Activity  Goal: Patient/Family Education  Outcome: Progressing Towards Goal     Problem: Falls - Risk of  Goal: *Absence of Falls  Description: Document Mona Ground Fall Risk and appropriate interventions in the flowsheet.   Outcome: Progressing Towards Goal  Note: Fall Risk Interventions:  Mobility Interventions: Bed/chair exit alarm, Communicate number of staff needed for ambulation/transfer, Patient to call before getting OOB, Strengthening exercises (ROM-active/passive), Utilize walker, cane, or other assistive device    Mentation Interventions: Adequate sleep, hydration, pain control, Bed/chair exit alarm, Door open when patient unattended, Evaluate medications/consider consulting pharmacy, Increase mobility, Room close to nurse's station, Update white board, Toileting rounds    Medication Interventions: Bed/chair exit alarm, Evaluate medications/consider consulting pharmacy, Patient to call before getting OOB, Teach patient to arise slowly    Elimination Interventions: Bed/chair exit alarm, Call light in reach, Elevated toilet seat, Patient to call for help with toileting needs, Stay With Me (per policy), Toilet paper/wipes in reach, Toileting schedule/hourly rounds, Urinal in reach              Problem: Patient Education: Go to Patient Education Activity  Goal: Patient/Family Education  Outcome: Progressing Towards Goal     Problem: Patient Education: Go to Patient Education Activity  Goal: Patient/Family Education  Outcome: Progressing Towards Goal     Problem: Patient Education: Go to Patient Education Activity  Goal: Patient/Family Education  Outcome: Progressing Towards Goal

## 2022-12-18 NOTE — PROGRESS NOTES
[id number] Shift Summary: Pt rested well overnight with no complaints. No new clinical concerns noted.      Nightshift Chart Audit Completed

## 2022-12-18 NOTE — PROGRESS NOTES
1930: report given to this nurse from [address] [place] [address] RN    7063: d/c order noted per MD Knox Senior RN    1200: d/c instructions reviewed with pt, all questions answered, no c/o health concern at time of d/c home, pt waiting for spouse to   PAUL Martínez RN    [phone]: pt spouse arrives to , pt escorted to d/c area  Franc Carvalho RN

## 2022-12-18 NOTE — PROGRESS NOTES
D/C Plan: Home with [facility] with spouse to pick patient up at 1200. CM spoke with spouse, she stated patient does not have portable tank of O2 but he is a [facility] patient. CM to call Neal Karan concerning sending him home with an O2 tank. Robertent spouse will pick patient up at noon. Patient spouse is looking into AL for patient's dementia, CM provided resource info about [facility] and area AL facilities. CM confirmed with RN and patient that he is alert and oriented. CM offered patient ELDA. Patient stated he wants to go home. CM provided portable tank of O2, paient to pick patient up in 20 mintes    Care Management Interventions  PCP Verified by CM: Yes  Mode of Transport at Discharge:  Other (see comment) (spouse to drive)  Transition of Care Consult (CM Consult): Discharge Planning, [address]: Yes  Discharge Durable Medical Equipment: No  Health Maintenance Reviewed: Yes  Physical Therapy Consult: Yes  Occupational Therapy Consult: Yes  Support Systems: Spouse/Significant Other  Confirm Follow Up Transport: Family  The Plan for Transition of Care is Related to the Following Treatment Goals : home with [facility] versus home with family assistance  The Patient and/or Patient Representative was Provided with a Choice of Provider and Agrees with the Discharge Plan?: Yes  Discharge Location  Patient Expects to be Discharged to[de-identified] Home with home health

## 2022-12-18 NOTE — PROGRESS NOTES
Bedside and Verbal shift change report given to Lily Sabillon RN  (oncoming nurse) by Dunia Marin RN  (offgoing nurse). Report given with SBAR, Kardex, Intake/Output and Recent Results.

## 2022-12-18 NOTE — DISCHARGE INSTRUCTIONS
DISCHARGE SUMMARY from Nurse    PATIENT INSTRUCTIONS:    After 24 hours or while taking prescription Narcotics:  Limit your activities  Do not drive and operate hazardous machinery  Do not make important personal or business decisions  Do  not drink alcoholic beverages  If you have not urinated within 8 hours after discharge, please contact your doctor on call. Report the following to your doctor:  Temperature over 101.5  Nausea and vomiting lasting longer than 6 hours or if unable to take medications  Any signs of decreased circulation or nerve impairment to extremity: change in color, persistent  numbness, tingling, coldness or increase pain  Any questions    What to do at Home:  Recommended activity: Activity as tolerated and no driving for today    If you experience any of the following symptoms, please follow up with your doctor. *  Please give a list of your current medications to your Primary Care Provider. *  Please update this list whenever your medications are discontinued, doses are      changed, or new medications (including over-the-counter products) are added. *  Please carry medication information at all times in case of emergency situations. These are general instructions for a healthy lifestyle:    No smoking/ No tobacco products/ Avoid exposure to second hand smoke  Surgeon General's Warning:  Quitting smoking now greatly reduces serious risk to your health. Obesity, smoking, and sedentary lifestyle greatly increases your risk for illness    A healthy diet, regular physical exercise & weight monitoring are important for maintaining a healthy lifestyle    You may be retaining fluid if you have a history of heart failure or if you experience any of the following symptoms:  Weight gain of 3 pounds or more overnight or 5 pounds in a week, increased swelling in our hands or feet or shortness of breath while lying flat in bed.   Please call your doctor as soon as you notice any of these symptoms; do not wait until your next office visit. The discharge information has been reviewed with the {PATIENT PARENT GUARDIAN:57820}. The {PATIENT PARENT GUARDIAN:75496} verbalized understanding. Discharge medications reviewed with the {Dishcarge meds reviewed MYFF:81844} and appropriate educational materials and side effects teaching were provided.   ___________________________________________________________________________________________________________________________________

## 2022-12-19 ENCOUNTER — PATIENT OUTREACH (OUTPATIENT)
Dept: CASE MANAGEMENT | Age: 77
End: 2022-12-19

## 2022-12-19 NOTE — PROGRESS NOTES
Care Transitions Initial Call    Call within 2 business days of discharge: Yes     Care Transitions Nurse (CTN) attempted to contact the patient by telephone to perform post hospital discharge assessment. Unable to reach. Left M requesting a return call. No PHI release on file. Plan to attempt to contact patient again tomorrow. Patient: Lucille Myers Patient :  MRN: [id number]    Last Discharge [address]       Date Complaint Diagnosis Description Type Department Provider    22 Respiratory Distress Respiratory distress . .. ED to Hosp-Admission (Discharged) (ADMIT) David Wiley, Kyle Hussein MD; [place]. .. Patient was admitted to THE [facility] from 22 to 22 for COPD exacerbation. Was this an external facility discharge? No Discharge Facility: [facility]        COVID-19 related testing was available at this time. Test results were negative. Advance Care Planning:   Does patient have an Advance Directive: yes, reviewed and current. Inpatient Readmission Risk score: Unplanned Readmit Risk Score: 24.9    Was this a readmission? no       Patients top risk factors for readmission: medical condition-COPD on home O2, chronic R pleural effusion, early dementia          Home Health/Outpatient orders at discharge: home health care, PT, OT, SN, personal care aide, and MSW  [address]: [facility]  Date of initial visit: TBD    Durable Medical Equipment ordered at discharge: None      Was patient discharged with a pulse oximeter? Unable to assess    If no appointment was previously scheduled, appointment scheduling offered:  n/a . Is follow up appointment scheduled within 7 days of discharge? no.   [address] follow up appointment(s): No future appointments. Non-[facility] follow up appointment(s): PCP Dr. Mendez Ybarra TBD    Plan for follow-up call in 1-2 days based on severity of symptoms and risk factors.   Plan for next call:  Complete initial MINI outreach  CTN provided contact information for future needs.

## 2022-12-20 ENCOUNTER — PATIENT OUTREACH (OUTPATIENT)
Dept: CASE MANAGEMENT | Age: 77
End: 2022-12-20

## 2022-12-20 ENCOUNTER — HOME CARE VISIT (OUTPATIENT)
Dept: HOME HEALTH SERVICES | Facility: HOME HEALTH | Age: 77
End: 2022-12-20

## 2022-12-20 LAB
BACTERIA SPEC CULT: NORMAL
SERVICE CMNT-IMP: NORMAL

## 2022-12-20 NOTE — PROGRESS NOTES
Care Transitions Initial Call    Call within 2 business days of discharge: Yes       Patient: Niles Clemente Patient : [birth year] MRN: [id number]    Last Discharge  Street       Date Complaint Diagnosis Description Type Department Provider    22 Respiratory Distress Respiratory distress . .. ED to Hosp-Admission (Discharged) (ADMIT) Efren Joyner MD; [place]. .. Patient was admitted to THE [facility] from 22 to 22 for COPD exacerbation. Was this an external facility discharge? No Discharge Facility: [facility]        Care Transitions Nurse (CTN) attempted to contact the patient by telephone to perform post hospital discharge assessment. Unable to reach. Left [facility] requesting a return call. No PHI release on file. Resolving Transitions of Care episode.

## 2022-12-21 LAB
BACTERIA SPEC CULT: NORMAL
SERVICE CMNT-IMP: NORMAL

## 2022-12-26 ENCOUNTER — HOME CARE VISIT (OUTPATIENT)
Dept: HOME HEALTH SERVICES | Facility: HOME HEALTH | Age: 77
End: 2022-12-26

## 2022-12-26 NOTE — PROGRESS NOTES
Physician Progress Note      Allison Alvarado  CSN #:                  [id number]  :                       1945  ADMIT DATE:       2022 4:58 PM  [address] [place] DATE:        2022 3:06 PM  RESPONDING  PROVIDER #:        Sathya Mullins MD          QUERY TEXT:    Patient admitted with shortness of breath and cough. COPD exacerbation. Discharge summary  Problems list included, \"RESOLVED: COPD with asthma and status asthmaticus. \" Based on clinical indicators and treatment, can the patient's respiratory condition be further specified as: The medical record reflects the following:    Risk Factors: COPD, CHF, Dependence on supplemental oxygen    Clinical Indicators: H&P: 76 y.o. male with history of chronic kidney disease, stage III, duodenal ulcers, chronic anemia, chronic iron deficiency, chronic respiratory failure due to chronic obstructive pulmonary disease on home oxygen, chronic diastolic heart failure, history of deep venous thrombosis, hypertension, paroxysmal atrial fibrillation, prostate cancer presented to the emergency room via EMS complaining of respiratory distress  COPD with exacerbation  DC summary problem list: RESOLVED: Status asthmaticus with COPD (chronic obstructive pulmonary disease) ([facility].    Treatment: Receiving Solumedrol, Duonebs, Pulmicort, Marni Cabot and antibiotics    Yaneth Bay RN, BSN, [facility], PETEY / [address], [address] [email or web address]  Options provided:  -- COPD exacerbation only  -- COPD exacerbation and asthma with status asthmaticus  -- Other - I will add my own diagnosis  -- Disagree - Not applicable / Not valid  -- Disagree - Clinically unable to determine / Unknown  -- Refer to Clinical Documentation Reviewer    PROVIDER RESPONSE TEXT:    This patient is being treated for COPD exacerbation only.     Query created by: Kapil Perry on 2022 9:23 AM      Electronically signed by:  Sathya Mullins MD 12/26/2022 6:47 PM

## 2022-12-27 ENCOUNTER — HOME CARE VISIT (OUTPATIENT)
Dept: HOME HEALTH SERVICES | Facility: HOME HEALTH | Age: 77
End: 2022-12-27

## 2022-12-28 ENCOUNTER — HOME CARE VISIT (OUTPATIENT)
Dept: SCHEDULING | Facility: HOME HEALTH | Age: 77
End: 2022-12-28

## 2022-12-30 NOTE — CASE COMMUNICATION
SN unable to reach patient via phone. SN left x2 messages .  We will try again tomorrow for third attempt

## 2022-12-30 NOTE — CASE COMMUNICATION
Unable to reach patient x 3 days. Several messages left with no return call. Patient will be made a nonadmit .   Thank you for the referral

## 2022-12-30 NOTE — CASE COMMUNICATION
Sn spoke with a  female who stated that they had too much going on this week and requested visit be made next week. Visit moved to Tuesday next week.

## 2023-01-01 ENCOUNTER — HOSPITAL ENCOUNTER (INPATIENT)
Facility: HOSPITAL | Age: 78
LOS: 2 days | DRG: 951 | End: 2023-05-21
Attending: FAMILY MEDICINE | Admitting: FAMILY MEDICINE
Payer: COMMERCIAL

## 2023-01-01 ENCOUNTER — HOME CARE VISIT (OUTPATIENT)
Age: 78
End: 2023-01-01
Payer: MEDICARE

## 2023-01-01 ENCOUNTER — HOSPICE ADMISSION (OUTPATIENT)
Age: 78
End: 2023-01-01
Payer: MEDICARE

## 2023-01-01 ENCOUNTER — APPOINTMENT (OUTPATIENT)
Facility: HOSPITAL | Age: 78
DRG: 189 | End: 2023-01-01
Payer: COMMERCIAL

## 2023-01-01 ENCOUNTER — HOSPITAL ENCOUNTER (INPATIENT)
Facility: HOSPITAL | Age: 78
LOS: 1 days | Discharge: HOSPICE | DRG: 189 | End: 2023-05-19
Attending: EMERGENCY MEDICINE | Admitting: STUDENT IN AN ORGANIZED HEALTH CARE EDUCATION/TRAINING PROGRAM
Payer: COMMERCIAL

## 2023-01-01 VITALS
RESPIRATION RATE: 22 BRPM | TEMPERATURE: 97.9 F | DIASTOLIC BLOOD PRESSURE: 52 MMHG | OXYGEN SATURATION: 96 % | HEART RATE: 101 BPM | SYSTOLIC BLOOD PRESSURE: 119 MMHG

## 2023-01-01 VITALS
SYSTOLIC BLOOD PRESSURE: 99 MMHG | OXYGEN SATURATION: 99 % | HEART RATE: 96 BPM | WEIGHT: 164 LBS | BODY MASS INDEX: 24.22 KG/M2 | RESPIRATION RATE: 20 BRPM | DIASTOLIC BLOOD PRESSURE: 79 MMHG | TEMPERATURE: 97.6 F

## 2023-01-01 DIAGNOSIS — J96.02 ACUTE RESPIRATORY FAILURE WITH HYPOXIA AND HYPERCAPNIA (HCC): Primary | ICD-10-CM

## 2023-01-01 DIAGNOSIS — J96.01 ACUTE RESPIRATORY FAILURE WITH HYPOXIA AND HYPERCAPNIA (HCC): Primary | ICD-10-CM

## 2023-01-01 DIAGNOSIS — J44.1 ACUTE EXACERBATION OF CHRONIC OBSTRUCTIVE PULMONARY DISEASE (COPD) (HCC): ICD-10-CM

## 2023-01-01 DIAGNOSIS — I48.91 ATRIAL FIBRILLATION WITH RVR (HCC): ICD-10-CM

## 2023-01-01 LAB
ACCESSION NUMBER, LLC1M: ABNORMAL
ACINETOBACTER CALCOAC BAUMANNII COMPLEX BY PCR: NOT DETECTED
ALBUMIN SERPL-MCNC: 2.9 G/DL (ref 3.5–5)
ALBUMIN/GLOB SERPL: 0.8 (ref 1.1–2.2)
ALP SERPL-CCNC: 132 U/L (ref 45–117)
ALT SERPL-CCNC: 37 U/L (ref 12–78)
ANION GAP SERPL CALC-SCNC: 3 MMOL/L (ref 5–15)
ARTERIAL PATENCY WRIST A: POSITIVE
ARTERIAL PATENCY WRIST A: YES
AST SERPL-CCNC: 49 U/L (ref 15–37)
B FRAGILIS DNA BLD POS QL NAA+NON-PROBE: NOT DETECTED
BACTERIA SPEC CULT: ABNORMAL
BASE DEFICIT BLD-SCNC: 6.9 MMOL/L
BASE DEFICIT BLDA-SCNC: 7.2 MMOL/L
BASOPHILS # BLD: 0.2 K/UL (ref 0–0.1)
BASOPHILS NFR BLD: 1 % (ref 0–1)
BDY SITE: ABNORMAL
BDY SITE: ABNORMAL
BILIRUB SERPL-MCNC: 0.3 MG/DL (ref 0.2–1)
BIOFIRE TEST COMMENT: ABNORMAL
BUN SERPL-MCNC: 24 MG/DL (ref 6–20)
BUN/CREAT SERPL: 16 (ref 12–20)
C ALBICANS DNA BLD POS QL NAA+NON-PROBE: NOT DETECTED
C AURIS DNA BLD POS QL NAA+NON-PROBE: NOT DETECTED
C GATTII+NEOFOR DNA BLD POS QL NAA+N-PRB: NOT DETECTED
C GLABRATA DNA BLD POS QL NAA+NON-PROBE: NOT DETECTED
C KRUSEI DNA BLD POS QL NAA+NON-PROBE: NOT DETECTED
C PARAP DNA BLD POS QL NAA+NON-PROBE: NOT DETECTED
C TROPICLS DNA BLD POS QL NAA+NON-PROBE: NOT DETECTED
CALCIUM SERPL-MCNC: 7.6 MG/DL (ref 8.5–10.1)
CHLORIDE SERPL-SCNC: 107 MMOL/L (ref 97–108)
CO2 SERPL-SCNC: 24 MMOL/L (ref 21–32)
CREAT SERPL-MCNC: 1.52 MG/DL (ref 0.7–1.3)
DIFFERENTIAL METHOD BLD: ABNORMAL
E CLOAC COMP DNA BLD POS NAA+NON-PROBE: NOT DETECTED
E COLI DNA BLD POS QL NAA+NON-PROBE: NOT DETECTED
E FAECALIS DNA BLD POS QL NAA+NON-PROBE: NOT DETECTED
E FAECIUM DNA BLD POS QL NAA+NON-PROBE: NOT DETECTED
EKG ATRIAL RATE: 107 BPM
EKG DIAGNOSIS: NORMAL
EKG Q-T INTERVAL: 356 MS
EKG QRS DURATION: 140 MS
EKG QTC CALCULATION (BAZETT): 505 MS
EKG R AXIS: 149 DEGREES
EKG T AXIS: 34 DEGREES
EKG VENTRICULAR RATE: 121 BPM
ENTEROBACTERALES DNA BLD POS NAA+N-PRB: NOT DETECTED
EOSINOPHIL # BLD: 0.3 K/UL (ref 0–0.4)
EOSINOPHIL NFR BLD: 2 % (ref 0–7)
ERYTHROCYTE [DISTWIDTH] IN BLOOD BY AUTOMATED COUNT: 14.5 % (ref 11.5–14.5)
FIO2 ON VENT: 35 %
GAS FLOW.O2 O2 DELIVERY SYS: ABNORMAL
GAS FLOW.O2 SETTING OXYMISER: 12
GLOBULIN SER CALC-MCNC: 3.6 G/DL (ref 2–4)
GLUCOSE SERPL-MCNC: 187 MG/DL (ref 65–100)
GP B STREP DNA BLD POS QL NAA+NON-PROBE: NOT DETECTED
HAEM INFLU DNA BLD POS QL NAA+NON-PROBE: NOT DETECTED
HCO3 BLD-SCNC: 23.2 MMOL/L (ref 22–26)
HCO3 BLDA-SCNC: 21 MMOL/L (ref 22–26)
HCT VFR BLD AUTO: 36.4 % (ref 36.6–50.3)
HGB BLD-MCNC: 11.2 G/DL (ref 12.1–17)
IMM GRANULOCYTES # BLD AUTO: 0 K/UL (ref 0–0.04)
IMM GRANULOCYTES NFR BLD AUTO: 0 % (ref 0–0.5)
K OXYTOCA DNA BLD POS QL NAA+NON-PROBE: NOT DETECTED
KLEBSIELLA SP DNA BLD POS QL NAA+NON-PRB: NOT DETECTED
KLEBSIELLA SP DNA BLD POS QL NAA+NON-PRB: NOT DETECTED
L MONOCYTOG DNA BLD POS QL NAA+NON-PROBE: NOT DETECTED
LACTATE BLD-SCNC: 1.65 MMOL/L (ref 0.4–2)
LYMPHOCYTES # BLD: 4.3 K/UL (ref 0.8–3.5)
LYMPHOCYTES NFR BLD: 27 % (ref 12–49)
MAGNESIUM SERPL-MCNC: 2.1 MG/DL (ref 1.6–2.4)
MCH RBC QN AUTO: 27.7 PG (ref 26–34)
MCHC RBC AUTO-ENTMCNC: 30.8 G/DL (ref 30–36.5)
MCV RBC AUTO: 89.9 FL (ref 80–99)
MECA+MECC ISLT/SPM QL: DETECTED
MONOCYTES # BLD: 0.2 K/UL (ref 0–1)
MONOCYTES NFR BLD: 1 % (ref 5–13)
N MEN DNA BLD POS QL NAA+NON-PROBE: NOT DETECTED
NEUTS BAND NFR BLD MANUAL: 6 %
NEUTS SEG # BLD: 10.8 K/UL (ref 1.8–8)
NEUTS SEG NFR BLD: 63 % (ref 32–75)
NRBC # BLD: 0 K/UL (ref 0–0.01)
NRBC BLD-RTO: 0 PER 100 WBC
NT PRO BNP: 1541 PG/ML
O2/TOTAL GAS SETTING VFR VENT: 5 %
P AERUGINOSA DNA BLD POS NAA+NON-PROBE: NOT DETECTED
PCO2 BLD: 70.7 MMHG (ref 35–45)
PCO2 BLDA: 50 MMHG (ref 35–45)
PEEP RESPIRATORY: 5
PH BLD: 7.13 (ref 7.35–7.45)
PH BLDA: 7.23 (ref 7.35–7.45)
PLATELET # BLD AUTO: 439 K/UL (ref 150–400)
PLATELET COMMENT: ABNORMAL
PMV BLD AUTO: 8.6 FL (ref 8.9–12.9)
PO2 BLD: 113 MMHG (ref 80–100)
PO2 BLDA: 83 MMHG (ref 80–100)
POTASSIUM SERPL-SCNC: 4.6 MMOL/L (ref 3.5–5.1)
PRESSURE SUPPORT SETTING VENT: 15
PROT SERPL-MCNC: 6.5 G/DL (ref 6.4–8.2)
PROTEUS SP DNA BLD POS QL NAA+NON-PROBE: NOT DETECTED
RBC # BLD AUTO: 4.05 M/UL (ref 4.1–5.7)
RBC MORPH BLD: ABNORMAL
RESISTANT GENE TARGETS: ABNORMAL
S AUREUS DNA BLD POS QL NAA+NON-PROBE: NOT DETECTED
S AUREUS+CONS DNA BLD POS NAA+NON-PROBE: DETECTED
S EPIDERMIDIS DNA BLD POS QL NAA+NON-PRB: DETECTED
S LUGDUNENSIS DNA BLD POS QL NAA+NON-PRB: NOT DETECTED
S MALTOPHILIA DNA BLD POS QL NAA+NON-PRB: NOT DETECTED
S MARCESCENS DNA BLD POS NAA+NON-PROBE: NOT DETECTED
S PNEUM DNA BLD POS QL NAA+NON-PROBE: NOT DETECTED
S PYO DNA BLD POS QL NAA+NON-PROBE: NOT DETECTED
SALMONELLA DNA BLD POS QL NAA+NON-PROBE: NOT DETECTED
SAO2 % BLD: 94 % (ref 92–97)
SAO2 % BLD: 96.2 % (ref 92–97)
SAO2% DEVICE SAO2% SENSOR NAME: ABNORMAL
SERVICE CMNT-IMP: ABNORMAL
SODIUM SERPL-SCNC: 134 MMOL/L (ref 136–145)
SPECIMEN SITE: ABNORMAL
SPECIMEN TYPE: ABNORMAL
STREPTOCOCCUS DNA BLD POS NAA+NON-PROBE: NOT DETECTED
TROPONIN I SERPL HS-MCNC: 12 NG/L (ref 0–76)
VENTILATION MODE VENT: ABNORMAL
WBC # BLD AUTO: 15.8 K/UL (ref 4.1–11.1)

## 2023-01-01 PROCEDURE — 2500000003 HC RX 250 WO HCPCS: Performed by: STUDENT IN AN ORGANIZED HEALTH CARE EDUCATION/TRAINING PROGRAM

## 2023-01-01 PROCEDURE — 2700000000 HC OXYGEN THERAPY PER DAY

## 2023-01-01 PROCEDURE — 2500000003 HC RX 250 WO HCPCS: Performed by: NURSE PRACTITIONER

## 2023-01-01 PROCEDURE — 6360000002 HC RX W HCPCS: Performed by: FAMILY MEDICINE

## 2023-01-01 PROCEDURE — 51702 INSERT TEMP BLADDER CATH: CPT

## 2023-01-01 PROCEDURE — 36600 WITHDRAWAL OF ARTERIAL BLOOD: CPT

## 2023-01-01 PROCEDURE — 0656 HSPC GENERAL INPATIENT

## 2023-01-01 PROCEDURE — 93005 ELECTROCARDIOGRAM TRACING: CPT | Performed by: EMERGENCY MEDICINE

## 2023-01-01 PROCEDURE — 1170000000 HC RM PRIVATE ONCOLOGY

## 2023-01-01 PROCEDURE — 2500000003 HC RX 250 WO HCPCS: Performed by: FAMILY MEDICINE

## 2023-01-01 PROCEDURE — 6370000000 HC RX 637 (ALT 250 FOR IP): Performed by: STUDENT IN AN ORGANIZED HEALTH CARE EDUCATION/TRAINING PROGRAM

## 2023-01-01 PROCEDURE — 87040 BLOOD CULTURE FOR BACTERIA: CPT

## 2023-01-01 PROCEDURE — 94664 DEMO&/EVAL PT USE INHALER: CPT

## 2023-01-01 PROCEDURE — 6360000002 HC RX W HCPCS

## 2023-01-01 PROCEDURE — 94640 AIRWAY INHALATION TREATMENT: CPT

## 2023-01-01 PROCEDURE — 37799 UNLISTED PX VASCULAR SURGERY: CPT

## 2023-01-01 PROCEDURE — 2060000000 HC ICU INTERMEDIATE R&B

## 2023-01-01 PROCEDURE — 82803 BLOOD GASES ANY COMBINATION: CPT

## 2023-01-01 PROCEDURE — 71045 X-RAY EXAM CHEST 1 VIEW: CPT

## 2023-01-01 PROCEDURE — 6360000002 HC RX W HCPCS: Performed by: EMERGENCY MEDICINE

## 2023-01-01 PROCEDURE — 85025 COMPLETE CBC W/AUTO DIFF WBC: CPT

## 2023-01-01 PROCEDURE — 94762 N-INVAS EAR/PLS OXIMTRY CONT: CPT

## 2023-01-01 PROCEDURE — 83735 ASSAY OF MAGNESIUM: CPT

## 2023-01-01 PROCEDURE — 6360000002 HC RX W HCPCS: Performed by: NURSE PRACTITIONER

## 2023-01-01 PROCEDURE — 51798 US URINE CAPACITY MEASURE: CPT

## 2023-01-01 PROCEDURE — 36415 COLL VENOUS BLD VENIPUNCTURE: CPT

## 2023-01-01 PROCEDURE — 96365 THER/PROPH/DIAG IV INF INIT: CPT

## 2023-01-01 PROCEDURE — 6360000002 HC RX W HCPCS: Performed by: STUDENT IN AN ORGANIZED HEALTH CARE EDUCATION/TRAINING PROGRAM

## 2023-01-01 PROCEDURE — 83880 ASSAY OF NATRIURETIC PEPTIDE: CPT

## 2023-01-01 PROCEDURE — 80053 COMPREHEN METABOLIC PANEL: CPT

## 2023-01-01 PROCEDURE — 94660 CPAP INITIATION&MGMT: CPT

## 2023-01-01 PROCEDURE — 96375 TX/PRO/DX INJ NEW DRUG ADDON: CPT

## 2023-01-01 PROCEDURE — 2500000003 HC RX 250 WO HCPCS: Performed by: EMERGENCY MEDICINE

## 2023-01-01 PROCEDURE — 87150 DNA/RNA AMPLIFIED PROBE: CPT

## 2023-01-01 PROCEDURE — 2580000003 HC RX 258: Performed by: EMERGENCY MEDICINE

## 2023-01-01 PROCEDURE — 83605 ASSAY OF LACTIC ACID: CPT

## 2023-01-01 PROCEDURE — 84484 ASSAY OF TROPONIN QUANT: CPT

## 2023-01-01 PROCEDURE — 5A09357 ASSISTANCE WITH RESPIRATORY VENTILATION, LESS THAN 24 CONSECUTIVE HOURS, CONTINUOUS POSITIVE AIRWAY PRESSURE: ICD-10-PCS | Performed by: INTERNAL MEDICINE

## 2023-01-01 PROCEDURE — 99285 EMERGENCY DEPT VISIT HI MDM: CPT

## 2023-01-01 RX ORDER — LORAZEPAM 2 MG/ML
1 INJECTION INTRAMUSCULAR EVERY 4 HOURS
Status: DISCONTINUED | OUTPATIENT
Start: 2023-01-01 | End: 2023-01-01

## 2023-01-01 RX ORDER — ONDANSETRON 2 MG/ML
4 INJECTION INTRAMUSCULAR; INTRAVENOUS
Status: COMPLETED | OUTPATIENT
Start: 2023-01-01 | End: 2023-01-01

## 2023-01-01 RX ORDER — HYDROMORPHONE HYDROCHLORIDE 1 MG/ML
1 INJECTION, SOLUTION INTRAMUSCULAR; INTRAVENOUS; SUBCUTANEOUS EVERY 4 HOURS
Status: DISCONTINUED | OUTPATIENT
Start: 2023-01-01 | End: 2023-01-01

## 2023-01-01 RX ORDER — ACETAMINOPHEN 650 MG/1
650 SUPPOSITORY RECTAL EVERY 4 HOURS PRN
Status: DISCONTINUED | OUTPATIENT
Start: 2023-01-01 | End: 2023-01-01 | Stop reason: HOSPADM

## 2023-01-01 RX ORDER — HYDROMORPHONE HYDROCHLORIDE 1 MG/ML
1 INJECTION, SOLUTION INTRAMUSCULAR; INTRAVENOUS; SUBCUTANEOUS ONCE
Status: COMPLETED | OUTPATIENT
Start: 2023-01-01 | End: 2023-01-01

## 2023-01-01 RX ORDER — LORAZEPAM 2 MG/ML
1 INJECTION INTRAMUSCULAR
Status: DISCONTINUED | OUTPATIENT
Start: 2023-01-01 | End: 2023-01-01 | Stop reason: HOSPADM

## 2023-01-01 RX ORDER — ALBUTEROL SULFATE 2.5 MG/3ML
2.5 SOLUTION RESPIRATORY (INHALATION)
Status: COMPLETED | OUTPATIENT
Start: 2023-01-01 | End: 2023-01-01

## 2023-01-01 RX ORDER — ALBUTEROL SULFATE 2.5 MG/3ML
SOLUTION RESPIRATORY (INHALATION)
Status: COMPLETED
Start: 2023-01-01 | End: 2023-01-01

## 2023-01-01 RX ORDER — HYDROMORPHONE HYDROCHLORIDE 2 MG/ML
2 INJECTION, SOLUTION INTRAMUSCULAR; INTRAVENOUS; SUBCUTANEOUS
Status: DISCONTINUED | OUTPATIENT
Start: 2023-01-01 | End: 2023-01-01 | Stop reason: HOSPADM

## 2023-01-01 RX ORDER — SODIUM CHLORIDE 0.9 % (FLUSH) 0.9 %
5 SYRINGE (ML) INJECTION PRN
Status: DISCONTINUED | OUTPATIENT
Start: 2023-01-01 | End: 2023-01-01 | Stop reason: HOSPADM

## 2023-01-01 RX ORDER — FENTANYL CITRATE 50 UG/ML
50 INJECTION, SOLUTION INTRAMUSCULAR; INTRAVENOUS
Status: COMPLETED | OUTPATIENT
Start: 2023-01-01 | End: 2023-01-01

## 2023-01-01 RX ORDER — HYDROMORPHONE HYDROCHLORIDE 2 MG/ML
1.5 INJECTION, SOLUTION INTRAMUSCULAR; INTRAVENOUS; SUBCUTANEOUS
Status: DISCONTINUED | OUTPATIENT
Start: 2023-01-01 | End: 2023-01-01

## 2023-01-01 RX ORDER — LORAZEPAM 2 MG/ML
1 INJECTION INTRAMUSCULAR EVERY 4 HOURS
Status: DISCONTINUED | OUTPATIENT
Start: 2023-01-01 | End: 2023-01-01 | Stop reason: HOSPADM

## 2023-01-01 RX ORDER — HYDROMORPHONE HYDROCHLORIDE 1 MG/ML
1 INJECTION, SOLUTION INTRAMUSCULAR; INTRAVENOUS; SUBCUTANEOUS
Status: DISCONTINUED | OUTPATIENT
Start: 2023-01-01 | End: 2023-01-01 | Stop reason: HOSPADM

## 2023-01-01 RX ORDER — METHYLPREDNISOLONE SODIUM SUCCINATE 125 MG/2ML
125 INJECTION, POWDER, LYOPHILIZED, FOR SOLUTION INTRAMUSCULAR; INTRAVENOUS
Status: COMPLETED | OUTPATIENT
Start: 2023-01-01 | End: 2023-01-01

## 2023-01-01 RX ORDER — IPRATROPIUM BROMIDE AND ALBUTEROL SULFATE 2.5; .5 MG/3ML; MG/3ML
1 SOLUTION RESPIRATORY (INHALATION) EVERY 4 HOURS PRN
Status: DISCONTINUED | OUTPATIENT
Start: 2023-01-01 | End: 2023-01-01 | Stop reason: HOSPADM

## 2023-01-01 RX ORDER — 0.9 % SODIUM CHLORIDE 0.9 %
500 INTRAVENOUS SOLUTION INTRAVENOUS ONCE
Status: COMPLETED | OUTPATIENT
Start: 2023-01-01 | End: 2023-01-01

## 2023-01-01 RX ORDER — HYDROMORPHONE HYDROCHLORIDE 1 MG/ML
1 INJECTION, SOLUTION INTRAMUSCULAR; INTRAVENOUS; SUBCUTANEOUS
Status: DISCONTINUED | OUTPATIENT
Start: 2023-01-01 | End: 2023-01-01

## 2023-01-01 RX ORDER — MORPHINE SULFATE 4 MG/ML
4 INJECTION INTRAVENOUS
Status: DISCONTINUED | OUTPATIENT
Start: 2023-01-01 | End: 2023-01-01 | Stop reason: HOSPADM

## 2023-01-01 RX ORDER — GLYCOPYRROLATE 0.2 MG/ML
0.2 INJECTION INTRAMUSCULAR; INTRAVENOUS EVERY 6 HOURS
Status: DISCONTINUED | OUTPATIENT
Start: 2023-01-01 | End: 2023-01-01 | Stop reason: HOSPADM

## 2023-01-01 RX ORDER — ONDANSETRON 2 MG/ML
4 INJECTION INTRAMUSCULAR; INTRAVENOUS EVERY 6 HOURS PRN
Status: DISCONTINUED | OUTPATIENT
Start: 2023-01-01 | End: 2023-01-01 | Stop reason: HOSPADM

## 2023-01-01 RX ORDER — LORAZEPAM 2 MG/ML
1 INJECTION INTRAMUSCULAR
Status: DISCONTINUED | OUTPATIENT
Start: 2023-01-01 | End: 2023-01-01 | Stop reason: SDUPTHER

## 2023-01-01 RX ORDER — DILTIAZEM HYDROCHLORIDE 5 MG/ML
10 INJECTION INTRAVENOUS
Status: COMPLETED | OUTPATIENT
Start: 2023-01-01 | End: 2023-01-01

## 2023-01-01 RX ORDER — ACETAMINOPHEN 325 MG/1
650 TABLET ORAL EVERY 6 HOURS PRN
Status: DISCONTINUED | OUTPATIENT
Start: 2023-01-01 | End: 2023-01-01 | Stop reason: HOSPADM

## 2023-01-01 RX ORDER — MORPHINE SULFATE 2 MG/ML
2 INJECTION, SOLUTION INTRAMUSCULAR; INTRAVENOUS
Status: DISCONTINUED | OUTPATIENT
Start: 2023-01-01 | End: 2023-01-01 | Stop reason: HOSPADM

## 2023-01-01 RX ORDER — HYDROMORPHONE HYDROCHLORIDE 1 MG/ML
1 INJECTION, SOLUTION INTRAMUSCULAR; INTRAVENOUS; SUBCUTANEOUS EVERY 4 HOURS
Status: DISCONTINUED | OUTPATIENT
Start: 2023-01-01 | End: 2023-01-01 | Stop reason: HOSPADM

## 2023-01-01 RX ORDER — MORPHINE SULFATE 10 MG/5ML
5 SOLUTION ORAL
Status: DISCONTINUED | OUTPATIENT
Start: 2023-01-01 | End: 2023-01-01 | Stop reason: HOSPADM

## 2023-01-01 RX ORDER — ALBUTEROL SULFATE 2.5 MG/3ML
15 SOLUTION RESPIRATORY (INHALATION)
Status: COMPLETED | OUTPATIENT
Start: 2023-01-01 | End: 2023-01-01

## 2023-01-01 RX ORDER — GLYCOPYRROLATE 0.2 MG/ML
0.2 INJECTION INTRAMUSCULAR; INTRAVENOUS EVERY 4 HOURS
Status: DISCONTINUED | OUTPATIENT
Start: 2023-01-01 | End: 2023-01-01

## 2023-01-01 RX ORDER — DIPHENHYDRAMINE HYDROCHLORIDE 50 MG/ML
25 INJECTION INTRAMUSCULAR; INTRAVENOUS ONCE
Status: COMPLETED | OUTPATIENT
Start: 2023-01-01 | End: 2023-01-01

## 2023-01-01 RX ORDER — MORPHINE SULFATE 2 MG/ML
2 INJECTION, SOLUTION INTRAMUSCULAR; INTRAVENOUS
Status: COMPLETED | OUTPATIENT
Start: 2023-01-01 | End: 2023-01-01

## 2023-01-01 RX ORDER — BISACODYL 10 MG
10 SUPPOSITORY, RECTAL RECTAL DAILY PRN
Status: DISCONTINUED | OUTPATIENT
Start: 2023-01-01 | End: 2023-01-01 | Stop reason: HOSPADM

## 2023-01-01 RX ORDER — LORAZEPAM 2 MG/ML
2 INJECTION INTRAMUSCULAR ONCE
Status: COMPLETED | OUTPATIENT
Start: 2023-01-01 | End: 2023-01-01

## 2023-01-01 RX ORDER — LORAZEPAM 2 MG/ML
1 INJECTION INTRAMUSCULAR EVERY 6 HOURS PRN
Status: DISCONTINUED | OUTPATIENT
Start: 2023-01-01 | End: 2023-01-01

## 2023-01-01 RX ORDER — MAGNESIUM SULFATE IN WATER 40 MG/ML
2000 INJECTION, SOLUTION INTRAVENOUS
Status: COMPLETED | OUTPATIENT
Start: 2023-01-01 | End: 2023-01-01

## 2023-01-01 RX ORDER — SCOLOPAMINE TRANSDERMAL SYSTEM 1 MG/1
1 PATCH, EXTENDED RELEASE TRANSDERMAL
Status: DISCONTINUED | OUTPATIENT
Start: 2023-01-01 | End: 2023-01-01 | Stop reason: HOSPADM

## 2023-01-01 RX ORDER — DEXTROSE AND SODIUM CHLORIDE 5; .45 G/100ML; G/100ML
125 INJECTION, SOLUTION INTRAVENOUS CONTINUOUS
Status: DISCONTINUED | OUTPATIENT
Start: 2023-01-01 | End: 2023-01-01

## 2023-01-01 RX ADMIN — LORAZEPAM 1 MG: 2 INJECTION INTRAMUSCULAR; INTRAVENOUS at 12:12

## 2023-01-01 RX ADMIN — IPRATROPIUM BROMIDE AND ALBUTEROL SULFATE 1 AMPULE: 2.5; .5 SOLUTION RESPIRATORY (INHALATION) at 15:44

## 2023-01-01 RX ADMIN — DILTIAZEM HYDROCHLORIDE 10 MG: 5 INJECTION, SOLUTION INTRAVENOUS at 18:54

## 2023-01-01 RX ADMIN — MAGNESIUM SULFATE HEPTAHYDRATE 2000 MG: 40 INJECTION, SOLUTION INTRAVENOUS at 18:29

## 2023-01-01 RX ADMIN — DEXTROSE AND SODIUM CHLORIDE 125 ML/HR: 5; 450 INJECTION, SOLUTION INTRAVENOUS at 22:06

## 2023-01-01 RX ADMIN — LORAZEPAM 1 MG: 2 INJECTION INTRAMUSCULAR; INTRAVENOUS at 22:57

## 2023-01-01 RX ADMIN — METHYLPREDNISOLONE SODIUM SUCCINATE 125 MG: 125 INJECTION, POWDER, FOR SOLUTION INTRAMUSCULAR; INTRAVENOUS at 18:29

## 2023-01-01 RX ADMIN — SODIUM CHLORIDE 5 MG/HR: 900 INJECTION, SOLUTION INTRAVENOUS at 18:58

## 2023-01-01 RX ADMIN — MORPHINE SULFATE 2 MG: 2 INJECTION, SOLUTION INTRAMUSCULAR; INTRAVENOUS at 03:58

## 2023-01-01 RX ADMIN — DIPHENHYDRAMINE HYDROCHLORIDE 25 MG: 50 INJECTION, SOLUTION INTRAMUSCULAR; INTRAVENOUS at 00:19

## 2023-01-01 RX ADMIN — GLYCOPYRROLATE 0.2 MG: 0.2 INJECTION INTRAMUSCULAR; INTRAVENOUS at 01:28

## 2023-01-01 RX ADMIN — LORAZEPAM 1 MG: 2 INJECTION INTRAMUSCULAR; INTRAVENOUS at 06:32

## 2023-01-01 RX ADMIN — LORAZEPAM 1 MG: 2 INJECTION INTRAMUSCULAR; INTRAVENOUS at 09:19

## 2023-01-01 RX ADMIN — ALBUTEROL SULFATE 2.5 MG: 2.5 SOLUTION RESPIRATORY (INHALATION) at 18:22

## 2023-01-01 RX ADMIN — LORAZEPAM 1 MG: 2 INJECTION INTRAMUSCULAR; INTRAVENOUS at 03:59

## 2023-01-01 RX ADMIN — GLYCOPYRROLATE 0.2 MG: 0.2 INJECTION INTRAMUSCULAR; INTRAVENOUS at 02:59

## 2023-01-01 RX ADMIN — LORAZEPAM 1 MG: 2 INJECTION INTRAMUSCULAR; INTRAVENOUS at 05:25

## 2023-01-01 RX ADMIN — GLYCOPYRROLATE 0.2 MG: 0.2 INJECTION INTRAMUSCULAR; INTRAVENOUS at 19:44

## 2023-01-01 RX ADMIN — GLYCOPYRROLATE 0.2 MG: 0.2 INJECTION INTRAMUSCULAR; INTRAVENOUS at 13:59

## 2023-01-01 RX ADMIN — HYDROMORPHONE HYDROCHLORIDE 1 MG: 1 INJECTION, SOLUTION INTRAMUSCULAR; INTRAVENOUS; SUBCUTANEOUS at 20:32

## 2023-01-01 RX ADMIN — GLYCOPYRROLATE 0.2 MG: 0.2 INJECTION INTRAMUSCULAR; INTRAVENOUS at 05:26

## 2023-01-01 RX ADMIN — ALBUTEROL SULFATE 15 MG/HR: 2.5 SOLUTION RESPIRATORY (INHALATION) at 18:33

## 2023-01-01 RX ADMIN — AZITHROMYCIN MONOHYDRATE 500 MG: 500 INJECTION, POWDER, LYOPHILIZED, FOR SOLUTION INTRAVENOUS at 22:00

## 2023-01-01 RX ADMIN — HYDROMORPHONE HYDROCHLORIDE 1 MG: 1 INJECTION, SOLUTION INTRAMUSCULAR; INTRAVENOUS; SUBCUTANEOUS at 05:27

## 2023-01-01 RX ADMIN — LORAZEPAM 1 MG: 2 INJECTION INTRAMUSCULAR; INTRAVENOUS at 17:49

## 2023-01-01 RX ADMIN — MORPHINE SULFATE 2 MG: 2 INJECTION, SOLUTION INTRAMUSCULAR; INTRAVENOUS at 12:12

## 2023-01-01 RX ADMIN — MORPHINE SULFATE 2 MG: 2 INJECTION, SOLUTION INTRAMUSCULAR; INTRAVENOUS at 09:10

## 2023-01-01 RX ADMIN — LORAZEPAM 1 MG: 2 INJECTION INTRAMUSCULAR; INTRAVENOUS at 10:32

## 2023-01-01 RX ADMIN — GLYCOPYRROLATE 0.2 MG: 0.2 INJECTION INTRAMUSCULAR; INTRAVENOUS at 22:55

## 2023-01-01 RX ADMIN — HYDROMORPHONE HYDROCHLORIDE 2 MG: 2 INJECTION, SOLUTION INTRAMUSCULAR; INTRAVENOUS; SUBCUTANEOUS at 19:45

## 2023-01-01 RX ADMIN — LORAZEPAM 1 MG: 2 INJECTION INTRAMUSCULAR; INTRAVENOUS at 15:32

## 2023-01-01 RX ADMIN — HYDROMORPHONE HYDROCHLORIDE 1 MG: 1 INJECTION, SOLUTION INTRAMUSCULAR; INTRAVENOUS; SUBCUTANEOUS at 02:58

## 2023-01-01 RX ADMIN — HYDROMORPHONE HYDROCHLORIDE 1 MG: 1 INJECTION, SOLUTION INTRAMUSCULAR; INTRAVENOUS; SUBCUTANEOUS at 22:58

## 2023-01-01 RX ADMIN — LORAZEPAM 1 MG: 2 INJECTION INTRAMUSCULAR; INTRAVENOUS at 01:29

## 2023-01-01 RX ADMIN — HYDROMORPHONE HYDROCHLORIDE 2 MG: 2 INJECTION, SOLUTION INTRAMUSCULAR; INTRAVENOUS; SUBCUTANEOUS at 04:00

## 2023-01-01 RX ADMIN — ONDANSETRON 4 MG: 2 INJECTION INTRAMUSCULAR; INTRAVENOUS at 20:41

## 2023-01-01 RX ADMIN — HYDROMORPHONE HYDROCHLORIDE 2 MG: 2 INJECTION, SOLUTION INTRAMUSCULAR; INTRAVENOUS; SUBCUTANEOUS at 17:49

## 2023-01-01 RX ADMIN — GLYCOPYRROLATE 0.2 MG: 0.2 INJECTION INTRAMUSCULAR; INTRAVENOUS at 18:33

## 2023-01-01 RX ADMIN — GLYCOPYRROLATE 0.2 MG: 0.2 INJECTION INTRAMUSCULAR; INTRAVENOUS at 10:30

## 2023-01-01 RX ADMIN — HYDROMORPHONE HYDROCHLORIDE 2 MG: 2 INJECTION, SOLUTION INTRAMUSCULAR; INTRAVENOUS; SUBCUTANEOUS at 22:11

## 2023-01-01 RX ADMIN — LORAZEPAM 1 MG: 2 INJECTION INTRAMUSCULAR; INTRAVENOUS at 03:00

## 2023-01-01 RX ADMIN — SODIUM CHLORIDE 1000 MG: 900 INJECTION INTRAVENOUS at 21:16

## 2023-01-01 RX ADMIN — LORAZEPAM 1 MG: 2 INJECTION INTRAMUSCULAR; INTRAVENOUS at 22:10

## 2023-01-01 RX ADMIN — HYDROMORPHONE HYDROCHLORIDE 1.5 MG: 2 INJECTION, SOLUTION INTRAMUSCULAR; INTRAVENOUS; SUBCUTANEOUS at 13:58

## 2023-01-01 RX ADMIN — HYDROMORPHONE HYDROCHLORIDE 1 MG: 1 INJECTION, SOLUTION INTRAMUSCULAR; INTRAVENOUS; SUBCUTANEOUS at 10:31

## 2023-01-01 RX ADMIN — LORAZEPAM 1 MG: 2 INJECTION INTRAMUSCULAR; INTRAVENOUS at 20:31

## 2023-01-01 RX ADMIN — MORPHINE SULFATE 4 MG: 4 INJECTION INTRAVENOUS at 06:31

## 2023-01-01 RX ADMIN — HYDROMORPHONE HYDROCHLORIDE 1 MG: 1 INJECTION, SOLUTION INTRAMUSCULAR; INTRAVENOUS; SUBCUTANEOUS at 00:08

## 2023-01-01 RX ADMIN — LORAZEPAM 1 MG: 2 INJECTION INTRAMUSCULAR; INTRAVENOUS at 04:16

## 2023-01-01 RX ADMIN — HYDROMORPHONE HYDROCHLORIDE 2 MG: 2 INJECTION, SOLUTION INTRAMUSCULAR; INTRAVENOUS; SUBCUTANEOUS at 01:28

## 2023-01-01 RX ADMIN — HYDROMORPHONE HYDROCHLORIDE 1 MG: 1 INJECTION, SOLUTION INTRAMUSCULAR; INTRAVENOUS; SUBCUTANEOUS at 15:32

## 2023-01-01 RX ADMIN — MORPHINE SULFATE 2 MG: 2 INJECTION, SOLUTION INTRAMUSCULAR; INTRAVENOUS at 20:41

## 2023-01-01 RX ADMIN — LORAZEPAM 1 MG: 2 INJECTION INTRAMUSCULAR; INTRAVENOUS at 15:14

## 2023-01-01 RX ADMIN — SODIUM CHLORIDE 500 ML: 9 INJECTION, SOLUTION INTRAVENOUS at 18:24

## 2023-01-01 RX ADMIN — LORAZEPAM 2 MG: 2 INJECTION INTRAMUSCULAR; INTRAVENOUS at 00:10

## 2023-01-01 RX ADMIN — FENTANYL CITRATE 50 MCG: 50 INJECTION, SOLUTION INTRAMUSCULAR; INTRAVENOUS at 18:53

## 2023-01-01 ASSESSMENT — PAIN SCALES - GENERAL
PAINLEVEL_OUTOF10: 5
PAINLEVEL_OUTOF10: 0
PAINLEVEL_OUTOF10: 5
PAINLEVEL_OUTOF10: 0
PAINLEVEL_OUTOF10: 7
PAINLEVEL_OUTOF10: 0
PAINLEVEL_OUTOF10: 9
PAINLEVEL_OUTOF10: 7

## 2023-01-01 ASSESSMENT — PAIN SCALES - PAIN ASSESSMENT IN ADVANCED DEMENTIA (PAINAD)
FACIALEXPRESSION: 0
TOTALSCORE: 4
FACIALEXPRESSION: 0
BODYLANGUAGE: 1
TOTALSCORE: 4
BODYLANGUAGE: 1
NEGVOCALIZATION: 1
NEGVOCALIZATION: 0
BREATHING: 1
CONSOLABILITY: 2
BREATHING: 1
BREATHING: 1
FACIALEXPRESSION: 0
TOTALSCORE: 4
NEGVOCALIZATION: 1
CONSOLABILITY: 1
BODYLANGUAGE: 1
CONSOLABILITY: 1

## 2023-01-01 ASSESSMENT — PAIN DESCRIPTION - ORIENTATION: ORIENTATION: INNER

## 2023-01-01 ASSESSMENT — PAIN DESCRIPTION - LOCATION
LOCATION: CHEST
LOCATION: CHEST
LOCATION: ARM;RIB CAGE

## 2023-01-25 ENCOUNTER — APPOINTMENT (OUTPATIENT)
Dept: GENERAL RADIOLOGY | Age: 78
End: 2023-01-25
Attending: EMERGENCY MEDICINE
Payer: COMMERCIAL

## 2023-01-25 ENCOUNTER — HOSPITAL ENCOUNTER (EMERGENCY)
Age: 78
Discharge: HOME | End: 2023-01-25
Attending: EMERGENCY MEDICINE
Payer: COMMERCIAL

## 2023-01-25 VITALS
SYSTOLIC BLOOD PRESSURE: 129 MMHG | HEART RATE: 107 BPM | WEIGHT: 166 LBS | TEMPERATURE: 98.3 F | OXYGEN SATURATION: 97 % | RESPIRATION RATE: 16 BRPM | BODY MASS INDEX: 25.24 KG/M2 | DIASTOLIC BLOOD PRESSURE: 60 MMHG

## 2023-01-25 DIAGNOSIS — J44.1 ACUTE EXACERBATION OF CHRONIC OBSTRUCTIVE PULMONARY DISEASE (COPD) (HCC): Primary | ICD-10-CM

## 2023-01-25 LAB
ALBUMIN SERPL-MCNC: 3.1 G/DL (ref 3.4–5)
ALBUMIN/GLOB SERPL: 0.9 (ref 0.8–1.7)
ALP SERPL-CCNC: 132 U/L (ref 45–117)
ALT SERPL-CCNC: 10 U/L (ref 16–61)
ANION GAP SERPL CALC-SCNC: 8 MMOL/L (ref 3–18)
AST SERPL-CCNC: 9 U/L (ref 10–38)
BASOPHILS # BLD: 0.2 K/UL (ref 0–0.1)
BASOPHILS NFR BLD: 2 % (ref 0–2)
BILIRUB SERPL-MCNC: 0.4 MG/DL (ref 0.2–1)
BNP SERPL-MCNC: 256 PG/ML (ref 0–1800)
BUN SERPL-MCNC: 21 MG/DL (ref 7–18)
BUN/CREAT SERPL: 14 (ref 12–20)
CALCIUM SERPL-MCNC: 8.3 MG/DL (ref 8.5–10.1)
CHLORIDE SERPL-SCNC: 103 MMOL/L (ref 100–111)
CO2 SERPL-SCNC: 25 MMOL/L (ref 21–32)
CREAT SERPL-MCNC: 1.53 MG/DL (ref 0.6–1.3)
DIFFERENTIAL METHOD BLD: ABNORMAL
EOSINOPHIL # BLD: 1.4 K/UL (ref 0–0.4)
EOSINOPHIL NFR BLD: 13 % (ref 0–5)
ERYTHROCYTE [DISTWIDTH] IN BLOOD BY AUTOMATED COUNT: 13.8 % (ref 11.6–14.5)
FLUAV RNA SPEC QL NAA+PROBE: NOT DETECTED
FLUBV RNA SPEC QL NAA+PROBE: NOT DETECTED
GLOBULIN SER CALC-MCNC: 3.3 G/DL (ref 2–4)
GLUCOSE SERPL-MCNC: 110 MG/DL (ref 74–99)
HCT VFR BLD AUTO: 34.1 % (ref 36–48)
HGB BLD-MCNC: 11.2 G/DL (ref 13–16)
IMM GRANULOCYTES # BLD AUTO: 0 K/UL (ref 0–0.04)
IMM GRANULOCYTES NFR BLD AUTO: 0 % (ref 0–0.5)
LYMPHOCYTES # BLD: 1.8 K/UL (ref 0.9–3.6)
LYMPHOCYTES NFR BLD: 17 % (ref 21–52)
MCH RBC QN AUTO: 28.4 PG (ref 24–34)
MCHC RBC AUTO-ENTMCNC: 32.8 G/DL (ref 31–37)
MCV RBC AUTO: 86.3 FL (ref 78–100)
MONOCYTES # BLD: 0.7 K/UL (ref 0.05–1.2)
MONOCYTES NFR BLD: 7 % (ref 3–10)
NEUTS SEG # BLD: 6.3 K/UL (ref 1.8–8)
NEUTS SEG NFR BLD: 61 % (ref 40–73)
NRBC # BLD: 0 K/UL (ref 0–0.01)
NRBC BLD-RTO: 0 PER 100 WBC
PLATELET # BLD AUTO: 334 K/UL (ref 135–420)
PMV BLD AUTO: 8.9 FL (ref 9.2–11.8)
POTASSIUM SERPL-SCNC: 4.8 MMOL/L (ref 3.5–5.5)
PROT SERPL-MCNC: 6.4 G/DL (ref 6.4–8.2)
RBC # BLD AUTO: 3.95 M/UL (ref 4.35–5.65)
RBC MORPH BLD: ABNORMAL
SARS-COV-2, COV2: NOT DETECTED
SODIUM SERPL-SCNC: 136 MMOL/L (ref 136–145)
TROPONIN-HIGH SENSITIVITY: 6 NG/L (ref 0–78)
WBC # BLD AUTO: 10.4 K/UL (ref 4.6–13.2)

## 2023-01-25 PROCEDURE — 99285 EMERGENCY DEPT VISIT HI MDM: CPT

## 2023-01-25 PROCEDURE — 84484 ASSAY OF TROPONIN QUANT: CPT

## 2023-01-25 PROCEDURE — 71045 X-RAY EXAM CHEST 1 VIEW: CPT

## 2023-01-25 PROCEDURE — 83880 ASSAY OF NATRIURETIC PEPTIDE: CPT

## 2023-01-25 PROCEDURE — 74011000250 HC RX REV CODE- 250: Performed by: EMERGENCY MEDICINE

## 2023-01-25 PROCEDURE — 93005 ELECTROCARDIOGRAM TRACING: CPT

## 2023-01-25 PROCEDURE — 85025 COMPLETE CBC W/AUTO DIFF WBC: CPT

## 2023-01-25 PROCEDURE — 87636 SARSCOV2 & INF A&B AMP PRB: CPT

## 2023-01-25 PROCEDURE — 74011250636 HC RX REV CODE- 250/636: Performed by: EMERGENCY MEDICINE

## 2023-01-25 PROCEDURE — 96374 THER/PROPH/DIAG INJ IV PUSH: CPT

## 2023-01-25 PROCEDURE — 96375 TX/PRO/DX INJ NEW DRUG ADDON: CPT

## 2023-01-25 PROCEDURE — 80053 COMPREHEN METABOLIC PANEL: CPT

## 2023-01-25 PROCEDURE — 94640 AIRWAY INHALATION TREATMENT: CPT

## 2023-01-25 RX ORDER — DEXAMETHASONE SODIUM PHOSPHATE 4 MG/ML
10 INJECTION, SOLUTION INTRA-ARTICULAR; INTRALESIONAL; INTRAMUSCULAR; INTRAVENOUS; SOFT TISSUE
Status: COMPLETED | OUTPATIENT
Start: 2023-01-25 | End: 2023-01-25

## 2023-01-25 RX ORDER — PREDNISONE 50 MG/1
50 TABLET ORAL DAILY
Qty: 5 TABLET | Refills: 0 | Status: SHIPPED | OUTPATIENT
Start: 2023-01-25 | End: 2023-01-30

## 2023-01-25 RX ORDER — ALBUTEROL SULFATE 0.83 MG/ML
5 SOLUTION RESPIRATORY (INHALATION)
Status: COMPLETED | OUTPATIENT
Start: 2023-01-25 | End: 2023-01-25

## 2023-01-25 RX ORDER — FUROSEMIDE 10 MG/ML
40 INJECTION INTRAMUSCULAR; INTRAVENOUS
Status: COMPLETED | OUTPATIENT
Start: 2023-01-25 | End: 2023-01-25

## 2023-01-25 RX ADMIN — ALBUTEROL SULFATE 5 MG: 2.5 SOLUTION RESPIRATORY (INHALATION) at 16:40

## 2023-01-25 RX ADMIN — DEXAMETHASONE SODIUM PHOSPHATE 10 MG: 4 INJECTION, SOLUTION INTRAMUSCULAR; INTRAVENOUS at 16:40

## 2023-01-25 RX ADMIN — FUROSEMIDE 40 MG: 10 INJECTION, SOLUTION INTRAVENOUS at 16:40

## 2023-01-25 NOTE — ED PROVIDER NOTES
EMERGENCY DEPARTMENT HISTORY AND PHYSICAL EXAM    Date: 1/25/2023  Patient Name: Zehra Maritns    History of Presenting Illness     Chief Complaint   Patient presents with    Shortness of Breath         History Provided By: Patient and EMS    Additional History (Context): Zehra Martins is a 76 y.o. male with hypertension, COPD, and congestive heart failure, hard of hearing, medical noncompliance, sleep apnea  who presents with complaint of shortness of breath all day today. Not using his oxygen at home. He says he only takes 1 medication at home. Received albuterol treatment in route. Patient denies any chest pain or chest tightness fever. No longer smokes. PCP: Narinder Avelar MD    Current Outpatient Medications   Medication Sig Dispense Refill    predniSONE (DELTASONE) 50 mg tablet Take 1 Tablet by mouth daily for 5 days. 5 Tablet 0    albuterol (PROVENTIL VENTOLIN) 2.5 mg /3 mL (0.083 %) nebu 3 mL by Nebulization route every four (4) hours as needed for Wheezing, Shortness of Breath or Cough. 30 Each 1    albuterol-ipratropium (DUO-NEB) 2.5 mg-0.5 mg/3 ml nebu 3 mL by Nebulization route every four (4) hours as needed for Wheezing. 10 Each 0    albuterol (PROVENTIL HFA, VENTOLIN HFA, PROAIR HFA) 90 mcg/actuation inhaler Take 2 Puffs by inhalation every four (4) hours as needed for Wheezing or Shortness of Breath. 1 Each 3    DAPTOMYCIN  mg by IntraVENous route daily. in 25mL NS      sodium chloride (Normal Saline Flush) 5-10 mL by IntraVENous route daily. flush IV catheter with 10ml before and after infusing each dose of medication as directed. heparin sodium,porcine (HEPARIN LOCK FLUSH IV) 3 mL by IntraVENous route daily. flush IV catheter with 3ml of heparin 10units/mL after the last dose of 0.9% sodium chloride flush, after each dose of medication or as directed. flush IV catheter every day if not in use.       ferrous sulfate 325 mg (65 mg iron) tablet Take 1 Tablet by mouth two (2) times daily (with meals). 60 Tablet 3    apixaban (ELIQUIS) 5 mg tablet Take 1 Tablet by mouth two (2) times a day. 60 Tablet 2    tamsulosin (FLOMAX) 0.4 mg capsule Take 1 Capsule by mouth every evening. 30 Capsule 3    furosemide (Lasix) 20 mg tablet Take 1 Tablet by mouth daily. 30 Tablet 3    amLODIPine (NORVASC) 10 mg tablet Take 1 Tablet by mouth daily. 30 Tablet 3    pantoprazole (PROTONIX) 40 mg tablet Take 1 Tablet by mouth Before breakfast and dinner. 60 Tablet 0    OXYGEN-AIR DELIVERY SYSTEMS 2 L/min by Nasal route daily as needed for PRN Reason (Other) (SOB/wheezing). dextran 70/hypromellose (ARTIFICIAL TEARS, PF, OP) Administer 2 Drops to both eyes daily as needed for Other (dry eyes). Past History     Past Medical History:  Past Medical History:   Diagnosis Date    BMI 30.0-30.9,adult 4/2/2022    Brain aneurysm     Brain aneurysm     Cancer [facility])     prostate    CHF (congestive heart failure) (HCC)     CKD (chronic kidney disease)     Closed nondisplaced supracondylar fracture of lower end of left femur without intracondylar extension with delayed healing     COPD (chronic obstructive pulmonary disease) ([address]     Debility     History of home oxygen therapy     [place] (hard of hearing)     Hypertension     Nocturia     On home oxygen therapy     PAF (paroxysmal atrial fibrillation) (HCC)     Pneumonia     Prostate CA ([address]     Prostate neoplasm     Radiation effect        Past Surgical History:  Past Surgical History:   Procedure Laterality Date    HX HERNIA REPAIR      HX HIP ARTHROSCOPY  04/09/2021       Family History:  Family History   Problem Relation Age of Onset    Heart Disease Mother        Social History:  Social History     Tobacco Use    Smoking status: Former     Types: Cigarettes    Smokeless tobacco: Never   Substance Use Topics    Alcohol use: No    Drug use: Never       Allergies:   Allergies   Allergen Reactions    Aspirin Other (comments)     \"messes my stomach up\"    Bactrim [Sulfamethoxazole-Trimethoprim] Unknown (comments)    Nsaids (Non-Steroidal Anti-Inflammatory Drug) Unknown (comments)         Review of Systems   Review of Systems   Constitutional:  Negative for fever. HENT:  Negative for congestion. Eyes: Negative. Respiratory:  Positive for shortness of breath and wheezing. Negative for cough and chest tightness. Cardiovascular:  Negative for chest pain and leg swelling. Gastrointestinal: Negative. Endocrine: Negative. Genitourinary: Negative. Musculoskeletal: Negative. Skin: Negative. Allergic/Immunologic: Negative. Neurological: Negative. Hematological: Negative. Psychiatric/Behavioral: Negative. All Other Systems Negative  Physical Exam     Vitals:    01/25/23 1634 01/25/23 1635 01/25/23 1636 01/25/23 1643   BP: (!) 141/74   122/63   Pulse: 92 88 98 86   Resp: 23 25 20 23   Temp:       SpO2: 98% 98% 97% 98%   Weight:         Physical Exam  Vitals and nursing note reviewed. Constitutional:       General: He is not in acute distress. Appearance: He is well-developed. He is not ill-appearing, toxic-appearing or diaphoretic. HENT:      Head: Normocephalic and atraumatic. Neck:      Thyroid: No thyromegaly. Vascular: No carotid bruit. Trachea: No tracheal deviation. Cardiovascular:      Rate and Rhythm: Normal rate and regular rhythm. Heart sounds: Normal heart sounds. No murmur heard. No friction rub. No gallop. Pulmonary:      Effort: Pulmonary effort is normal. No respiratory distress. Breath sounds: No stridor. Examination of the right-upper field reveals wheezing. Examination of the left-upper field reveals wheezing. Examination of the right-middle field reveals wheezing. Examination of the left-middle field reveals wheezing. Examination of the right-lower field reveals wheezing. Examination of the left-lower field reveals wheezing. Wheezing present. No rales.    Chest:      Chest wall: No tenderness. Abdominal:      General: There is no distension. Palpations: Abdomen is soft. There is no mass. Tenderness: There is no abdominal tenderness. There is no guarding or rebound. Musculoskeletal:         General: Normal range of motion. Cervical back: Normal range of motion and neck supple. Skin:     General: Skin is warm and dry. Coloration: Skin is not pale. Neurological:      Mental Status: He is alert. Psychiatric:         Speech: Speech normal.         Behavior: Behavior normal.         Thought Content: Thought content normal.         Judgment: Judgment normal.          Diagnostic Study Results     Labs -     Recent Results (from the past 12 hour(s))   CBC WITH AUTOMATED DIFF    Collection Time: 01/25/23  4:19 PM   Result Value Ref Range    WBC 10.4 4.6 - 13.2 K/uL    RBC 3.95 (L) 4.35 - 5.65 M/uL    HGB 11.2 (L) 13.0 - 16.0 g/dL    HCT 34.1 (L) 36.0 - 48.0 %    MCV 86.3 78.0 - 100.0 FL    MCH 28.4 24.0 - 34.0 PG    MCHC 32.8 31.0 - 37.0 g/dL    RDW 13.8 11.6 - 14.5 %    PLATELET 339 992 - 767 K/uL    MPV 8.9 (L) 9.2 - 11.8 FL    NRBC 0.0 0  WBC    ABSOLUTE NRBC 0.00 0.00 - 0.01 K/uL    NEUTROPHILS 61 40 - 73 %    LYMPHOCYTES 17 (L) 21 - 52 %    MONOCYTES 7 3 - 10 %    EOSINOPHILS 13 (H) 0 - 5 %    BASOPHILS 2 0 - 2 %    IMMATURE GRANULOCYTES 0 0.0 - 0.5 %    ABS. NEUTROPHILS 6.3 1.8 - 8.0 K/UL    ABS. LYMPHOCYTES 1.8 0.9 - 3.6 K/UL    ABS. MONOCYTES 0.7 0.05 - 1.2 K/UL    ABS. EOSINOPHILS 1.4 (H) 0.0 - 0.4 K/UL    ABS. BASOPHILS 0.2 (H) 0.0 - 0.1 K/UL    ABS. IMM.  GRANS. 0.0 0.00 - 0.04 K/UL    DF AUTOMATED      RBC COMMENTS NORMOCYTIC, NORMOCHROMIC     METABOLIC PANEL, COMPREHENSIVE    Collection Time: 01/25/23  4:19 PM   Result Value Ref Range    Sodium 136 136 - 145 mmol/L    Potassium 4.8 3.5 - 5.5 mmol/L    Chloride 103 100 - 111 mmol/L    CO2 25 21 - 32 mmol/L    Anion gap 8 3.0 - 18 mmol/L    Glucose 110 (H) 74 - 99 mg/dL    BUN 21 (H) 7.0 - 18 MG/DL Creatinine 1.53 (H) 0.6 - 1.3 MG/DL    BUN/Creatinine ratio 14 12 - 20      eGFR 46 (L) >60 ml/min/1.73m2    Calcium 8.3 (L) 8.5 - 10.1 MG/DL    Bilirubin, total 0.4 0.2 - 1.0 MG/DL    ALT (SGPT) 10 (L) 16 - 61 U/L    AST (SGOT) 9 (L) 10 - 38 U/L    Alk. phosphatase 132 (H) 45 - 117 U/L    Protein, total 6.4 6.4 - 8.2 g/dL    Albumin 3.1 (L) 3.4 - 5.0 g/dL    Globulin 3.3 2.0 - 4.0 g/dL    A-G Ratio 0.9 0.8 - 1.7     TROPONIN-HIGH SENSITIVITY    Collection Time: 01/25/23  4:19 PM   Result Value Ref Range    Troponin-High Sensitivity 6 0 - 78 ng/L   NT-PRO BNP    Collection Time: 01/25/23  4:19 PM   Result Value Ref Range    NT pro- 0 - 1,800 PG/ML   EKG, 12 LEAD, INITIAL    Collection Time: 01/25/23  4:21 PM   Result Value Ref Range    Ventricular Rate 89 BPM    Atrial Rate 89 BPM    P-R Interval 166 ms    QRS Duration 82 ms    Q-T Interval 354 ms    QTC Calculation (Bezet) 430 ms    Calculated P Axis 65 degrees    Calculated R Axis 0 degrees    Calculated T Axis 64 degrees    Diagnosis       Normal sinus rhythm  Normal ECG  When compared with ECG of 14-DEC-2022 16:58,  No significant change was found     COVID-19 WITH INFLUENZA A/B    Collection Time: 01/25/23  4:45 PM   Result Value Ref Range    SARS-CoV-2 by PCR Not detected NOTD      Influenza A by PCR Not detected NOTD      Influenza B by PCR Not detected NOTD         Radiologic Studies -   XR CHEST PORT   Final Result   No acute process seen. Pleural plaque decreases sensitivity for   detection of right lung process. CT Results  (Last 48 hours)      None          CXR Results  (Last 48 hours)                 01/25/23 1651  XR CHEST PORT Final result    Impression:  No acute process seen. Pleural plaque decreases sensitivity for   detection of right lung process. Narrative:  EXAM: XR CHEST PORT       INDICATION: Shortness of breath       COMPARISON: 12/14/2022       FINDINGS: A portable AP radiograph of the chest was obtained at 1646 hours. The   patient is on a cardiac monitor. There is been no appreciable change in the   chronic right-sided pleural plaque with associated diminutive right hemithorax. The left lung is clear. The cardiac and mediastinal contours and pulmonary   vascularity are stable. The bones and soft tissues are grossly within normal   limits. Medical Decision Making   I am the first provider for this patient. I reviewed the vital signs, available nursing notes, past medical history, past surgical history, family history and social history. Vital Signs-Reviewed the patient's vital signs. Records Reviewed: Nursing Notes and Old Medical Records    Procedures:  EKG    Date/Time: 1/25/2023 4:21 PM  Performed by: TRINA Gill  Authorized by: TRINA Gill     Interpretation:     Interpretation: normal    Rate:     ECG rate:  89    ECG rate assessment: normal    Rhythm:     Rhythm: sinus rhythm    Ectopy:     Ectopy: none    QRS:     QRS axis:  Normal    QRS intervals:  Normal    QRS conduction: normal    ST segments:     ST segments:  Normal  T waves:     T waves: normal    EKG    Date/Time: 1/25/2023 7:17 PM  Performed by: TRINA Gill  Authorized by: TRINA Gill     ECG reviewed by ED Physician in the absence of a cardiologist: yes    Interpretation:     Interpretation: abnormal    Rate:     ECG rate:  114    ECG rate assessment: tachycardic    Rhythm:     Rhythm: atrial fibrillation    QRS:     QRS axis:  Normal    QRS intervals:  Normal    QRS conduction: normal    ST segments:     ST segments:  Normal  T waves:     T waves: normal      Provider Notes (Medical Decision Making): With history of COPD CHF already feeling much better by time of arrival with EMS who administered albuterol nebs. Given Decadron additional nebs and Lasix here. Says he is feeling just fine. Not hypoxic chest x-ray shows no acute process vital signs are stable.   Labs show no acute in ACS concern elevation in his proBNP anemia or changes in his electrolytes. Plan for discharge with steroids sent to home pharmacy. COVID flu negative. Patient was complaining of wanting to void. Provider went into the room and while standing to urinate his heart rate went from sinus rhythm to A. fib with RVR patient still said he felt well repeat EKG was taken and is showing a rate of 114. Denies any shortness of breath or discomfort. Has received multiple nebs here. We will plan for discharge as he has a history of A. fib and is on anticoagulation. MED RECONCILIATION:  No current facility-administered medications for this encounter. Current Outpatient Medications   Medication Sig    predniSONE (DELTASONE) 50 mg tablet Take 1 Tablet by mouth daily for 5 days. albuterol (PROVENTIL VENTOLIN) 2.5 mg /3 mL (0.083 %) nebu 3 mL by Nebulization route every four (4) hours as needed for Wheezing, Shortness of Breath or Cough. albuterol-ipratropium (DUO-NEB) 2.5 mg-0.5 mg/3 ml nebu 3 mL by Nebulization route every four (4) hours as needed for Wheezing. albuterol (PROVENTIL HFA, VENTOLIN HFA, PROAIR HFA) 90 mcg/actuation inhaler Take 2 Puffs by inhalation every four (4) hours as needed for Wheezing or Shortness of Breath. DAPTOMYCIN  mg by IntraVENous route daily. in 25mL NS    sodium chloride (Normal Saline Flush) 5-10 mL by IntraVENous route daily. flush IV catheter with 10ml before and after infusing each dose of medication as directed. heparin sodium,porcine (HEPARIN LOCK FLUSH IV) 3 mL by IntraVENous route daily. flush IV catheter with 3ml of heparin 10units/mL after the last dose of 0.9% sodium chloride flush, after each dose of medication or as directed. flush IV catheter every day if not in use. ferrous sulfate 325 mg (65 mg iron) tablet Take 1 Tablet by mouth two (2) times daily (with meals). apixaban (ELIQUIS) 5 mg tablet Take 1 Tablet by mouth two (2) times a day.     tamsulosin (FLOMAX) 0.4 mg capsule Take 1 Capsule by mouth every evening. furosemide (Lasix) 20 mg tablet Take 1 Tablet by mouth daily. amLODIPine (NORVASC) 10 mg tablet Take 1 Tablet by mouth daily. pantoprazole (PROTONIX) 40 mg tablet Take 1 Tablet by mouth Before breakfast and dinner. OXYGEN-AIR DELIVERY SYSTEMS 2 L/min by Nasal route daily as needed for PRN Reason (Other) (SOB/wheezing). dextran 70/hypromellose (ARTIFICIAL TEARS, PF, OP) Administer 2 Drops to both eyes daily as needed for Other (dry eyes). Disposition:  home    DISCHARGE NOTE:   5:45 PM    Pt has been reexamined. Patient has no new complaints, changes, or physical findings. Care plan outlined and precautions discussed. Results of labs, CXR were reviewed with the patient. All medications were reviewed with the patient; will d/c home with prednisone. All of pt's questions and concerns were addressed. Patient was instructed and agrees to follow up with PCP, as well as to return to the ED upon further deterioration. Patient is ready to go home. Follow-up Information       Follow up With Specialties Details Why Contact Info    Tommy Downey Se, MD Family Medicine Schedule an appointment as soon as possible for a visit in 2 days  [address] [facility] [address] [facility] [address] [facility] Emergency Medicine  If symptoms worsen return immediately [address] [facility]  [phone]            Current Discharge Medication List        START taking these medications    Details   predniSONE (DELTASONE) 50 mg tablet Take 1 Tablet by mouth daily for 5 days. Qty: 5 Tablet, Refills: 0  Start date: 1/25/2023, End date: 1/30/2023             Diagnosis     Clinical Impression:   1.  Acute exacerbation of chronic obstructive pulmonary disease (COPD) ([facility]

## 2023-01-26 LAB
ATRIAL RATE: 89 BPM
CALCULATED P AXIS, ECG09: 65 DEGREES
CALCULATED R AXIS, ECG10: 0 DEGREES
CALCULATED R AXIS, ECG10: 23 DEGREES
CALCULATED T AXIS, ECG11: 20 DEGREES
CALCULATED T AXIS, ECG11: 64 DEGREES
DIAGNOSIS, 93000: NORMAL
DIAGNOSIS, 93000: NORMAL
P-R INTERVAL, ECG05: 166 MS
Q-T INTERVAL, ECG07: 340 MS
Q-T INTERVAL, ECG07: 354 MS
QRS DURATION, ECG06: 82 MS
QRS DURATION, ECG06: 86 MS
QTC CALCULATION (BEZET), ECG08: 430 MS
QTC CALCULATION (BEZET), ECG08: 468 MS
VENTRICULAR RATE, ECG03: 114 BPM
VENTRICULAR RATE, ECG03: 89 BPM

## 2023-01-26 NOTE — ED NOTES
Report received from [facility], [facility] on [address]. Report consisted of patients Situation, Background, Assessment and   Recommendations(SBAR). Information from the following report(s) SBAR, Kardex, ED Summary, MAR, and Recent Results was reviewed with the receiving nurse. Opportunity for questions and clarification was provided.

## 2023-01-31 ENCOUNTER — HOSPITAL ENCOUNTER (EMERGENCY)
Age: 78
Discharge: HOME | End: 2023-01-31
Attending: STUDENT IN AN ORGANIZED HEALTH CARE EDUCATION/TRAINING PROGRAM
Payer: COMMERCIAL

## 2023-01-31 ENCOUNTER — APPOINTMENT (OUTPATIENT)
Dept: CT IMAGING | Age: 78
End: 2023-01-31
Attending: STUDENT IN AN ORGANIZED HEALTH CARE EDUCATION/TRAINING PROGRAM
Payer: COMMERCIAL

## 2023-01-31 ENCOUNTER — APPOINTMENT (OUTPATIENT)
Dept: GENERAL RADIOLOGY | Age: 78
End: 2023-01-31
Attending: STUDENT IN AN ORGANIZED HEALTH CARE EDUCATION/TRAINING PROGRAM
Payer: COMMERCIAL

## 2023-01-31 VITALS
HEART RATE: 79 BPM | HEIGHT: 68 IN | OXYGEN SATURATION: 97 % | DIASTOLIC BLOOD PRESSURE: 67 MMHG | RESPIRATION RATE: 23 BRPM | TEMPERATURE: 98 F | WEIGHT: 153 LBS | BODY MASS INDEX: 23.19 KG/M2 | SYSTOLIC BLOOD PRESSURE: 153 MMHG

## 2023-01-31 DIAGNOSIS — R31.21 ASYMPTOMATIC MICROSCOPIC HEMATURIA: ICD-10-CM

## 2023-01-31 DIAGNOSIS — R06.02 SOB (SHORTNESS OF BREATH): ICD-10-CM

## 2023-01-31 DIAGNOSIS — J34.9 SINUS DISEASE: Primary | ICD-10-CM

## 2023-01-31 LAB
ALBUMIN SERPL-MCNC: 3.2 G/DL (ref 3.4–5)
ALBUMIN/GLOB SERPL: 1 (ref 0.8–1.7)
ALP SERPL-CCNC: 124 U/L (ref 45–117)
ALT SERPL-CCNC: 15 U/L (ref 16–61)
ANION GAP SERPL CALC-SCNC: 7 MMOL/L (ref 3–18)
APPEARANCE UR: CLEAR
AST SERPL-CCNC: 11 U/L (ref 10–38)
ATRIAL RATE: 84 BPM
BACTERIA URNS QL MICRO: ABNORMAL /HPF
BASOPHILS # BLD: 0.1 K/UL (ref 0–0.1)
BASOPHILS NFR BLD: 1 % (ref 0–2)
BILIRUB SERPL-MCNC: 0.3 MG/DL (ref 0.2–1)
BILIRUB UR QL: NEGATIVE
BUN SERPL-MCNC: 25 MG/DL (ref 7–18)
BUN/CREAT SERPL: 20 (ref 12–20)
CALCIUM SERPL-MCNC: 8.3 MG/DL (ref 8.5–10.1)
CALCULATED P AXIS, ECG09: 79 DEGREES
CALCULATED R AXIS, ECG10: -13 DEGREES
CALCULATED T AXIS, ECG11: 73 DEGREES
CHLORIDE SERPL-SCNC: 108 MMOL/L (ref 100–111)
CO2 SERPL-SCNC: 25 MMOL/L (ref 21–32)
COLOR UR: YELLOW
CREAT SERPL-MCNC: 1.27 MG/DL (ref 0.6–1.3)
DIAGNOSIS, 93000: NORMAL
DIFFERENTIAL METHOD BLD: ABNORMAL
EOSINOPHIL # BLD: 1.3 K/UL (ref 0–0.4)
EOSINOPHIL NFR BLD: 14 % (ref 0–5)
EPITH CASTS URNS QL MICRO: ABNORMAL /LPF (ref 0–5)
ERYTHROCYTE [DISTWIDTH] IN BLOOD BY AUTOMATED COUNT: 14.1 % (ref 11.6–14.5)
FLUAV RNA SPEC QL NAA+PROBE: NOT DETECTED
FLUBV RNA SPEC QL NAA+PROBE: NOT DETECTED
GLOBULIN SER CALC-MCNC: 3.3 G/DL (ref 2–4)
GLUCOSE SERPL-MCNC: 101 MG/DL (ref 74–99)
GLUCOSE UR STRIP.AUTO-MCNC: NEGATIVE MG/DL
HCT VFR BLD AUTO: 34.7 % (ref 36–48)
HGB BLD-MCNC: 11.1 G/DL (ref 13–16)
HGB UR QL STRIP: ABNORMAL
IMM GRANULOCYTES # BLD AUTO: 0.1 K/UL (ref 0–0.04)
IMM GRANULOCYTES NFR BLD AUTO: 1 % (ref 0–0.5)
KETONES UR QL STRIP.AUTO: NEGATIVE MG/DL
LEUKOCYTE ESTERASE UR QL STRIP.AUTO: NEGATIVE
LYMPHOCYTES # BLD: 1.4 K/UL (ref 0.9–3.6)
LYMPHOCYTES NFR BLD: 15 % (ref 21–52)
MCH RBC QN AUTO: 28.2 PG (ref 24–34)
MCHC RBC AUTO-ENTMCNC: 32 G/DL (ref 31–37)
MCV RBC AUTO: 88.1 FL (ref 78–100)
MONOCYTES # BLD: 0.6 K/UL (ref 0.05–1.2)
MONOCYTES NFR BLD: 6 % (ref 3–10)
NEUTS SEG # BLD: 5.8 K/UL (ref 1.8–8)
NEUTS SEG NFR BLD: 63 % (ref 40–73)
NITRITE UR QL STRIP.AUTO: NEGATIVE
NRBC # BLD: 0 K/UL (ref 0–0.01)
NRBC BLD-RTO: 0 PER 100 WBC
P-R INTERVAL, ECG05: 162 MS
PH UR STRIP: 5.5 (ref 5–8)
PLATELET # BLD AUTO: 350 K/UL (ref 135–420)
PMV BLD AUTO: 8.4 FL (ref 9.2–11.8)
POTASSIUM SERPL-SCNC: 4.5 MMOL/L (ref 3.5–5.5)
PROT SERPL-MCNC: 6.5 G/DL (ref 6.4–8.2)
PROT UR STRIP-MCNC: 30 MG/DL
Q-T INTERVAL, ECG07: 370 MS
QRS DURATION, ECG06: 76 MS
QTC CALCULATION (BEZET), ECG08: 437 MS
RBC # BLD AUTO: 3.94 M/UL (ref 4.35–5.65)
RBC #/AREA URNS HPF: ABNORMAL /HPF (ref 0–5)
RBC MORPH BLD: ABNORMAL
SARS-COV-2 RNA RESP QL NAA+PROBE: NOT DETECTED
SODIUM SERPL-SCNC: 140 MMOL/L (ref 136–145)
SP GR UR REFRACTOMETRY: 1.02 (ref 1–1.03)
TROPONIN I SERPL HS-MCNC: 6 NG/L (ref 0–78)
UROBILINOGEN UR QL STRIP.AUTO: 0.2 EU/DL (ref 0.2–1)
VENTRICULAR RATE, ECG03: 84 BPM
WBC # BLD AUTO: 9.3 K/UL (ref 4.6–13.2)
WBC MORPH BLD: ABNORMAL
WBC URNS QL MICRO: ABNORMAL /HPF (ref 0–5)

## 2023-01-31 PROCEDURE — 99285 EMERGENCY DEPT VISIT HI MDM: CPT

## 2023-01-31 PROCEDURE — 87636 SARSCOV2 & INF A&B AMP PRB: CPT

## 2023-01-31 PROCEDURE — 74011000250 HC RX REV CODE- 250: Performed by: STUDENT IN AN ORGANIZED HEALTH CARE EDUCATION/TRAINING PROGRAM

## 2023-01-31 PROCEDURE — 80053 COMPREHEN METABOLIC PANEL: CPT

## 2023-01-31 PROCEDURE — 71045 X-RAY EXAM CHEST 1 VIEW: CPT

## 2023-01-31 PROCEDURE — 70450 CT HEAD/BRAIN W/O DYE: CPT

## 2023-01-31 PROCEDURE — 84484 ASSAY OF TROPONIN QUANT: CPT

## 2023-01-31 PROCEDURE — 81001 URINALYSIS AUTO W/SCOPE: CPT

## 2023-01-31 PROCEDURE — 85025 COMPLETE CBC W/AUTO DIFF WBC: CPT

## 2023-01-31 RX ORDER — IPRATROPIUM BROMIDE AND ALBUTEROL SULFATE 2.5; .5 MG/3ML; MG/3ML
3 SOLUTION RESPIRATORY (INHALATION)
Status: COMPLETED | OUTPATIENT
Start: 2023-01-31 | End: 2023-01-31

## 2023-01-31 RX ADMIN — IPRATROPIUM BROMIDE AND ALBUTEROL SULFATE 3 ML: .5; 3 SOLUTION RESPIRATORY (INHALATION) at 20:05

## 2023-01-31 NOTE — ED TRIAGE NOTES
Pt arrives to ed per ems coming from home. Wife called ems due to her not being able to care for him any longer.

## 2023-02-01 NOTE — DISCHARGE INSTRUCTIONS
Your Head CT, Chest Xray, and blood work do not show any emergent or life threatening illness. You should follow up with your primary care doctor to talk about the small amount of blood in your urine. It may be from the way the urine was collected today. I have placed the name of an ENT doctor in the chart. You can call his office to make an appointment to discuss your sinus disease. Please continue to utilize your home health and social work. Your primary care doctor will also be a place to help.
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
## 2023-02-01 NOTE — ED NOTES
Spoke with patient's wife to obtain ride home. Stated that her daughter, Nora Davis, would be picking up the patient in aprox. 30 minutes.

## 2023-02-06 ENCOUNTER — HOSPITAL ENCOUNTER (EMERGENCY)
Age: 78
Discharge: HOME | End: 2023-02-06
Attending: EMERGENCY MEDICINE | Admitting: EMERGENCY MEDICINE
Payer: COMMERCIAL

## 2023-02-06 ENCOUNTER — APPOINTMENT (OUTPATIENT)
Dept: GENERAL RADIOLOGY | Age: 78
End: 2023-02-06
Attending: EMERGENCY MEDICINE
Payer: COMMERCIAL

## 2023-02-06 VITALS
BODY MASS INDEX: 27.99 KG/M2 | HEART RATE: 79 BPM | OXYGEN SATURATION: 95 % | SYSTOLIC BLOOD PRESSURE: 138 MMHG | RESPIRATION RATE: 16 BRPM | TEMPERATURE: 97.9 F | DIASTOLIC BLOOD PRESSURE: 39 MMHG | WEIGHT: 189 LBS | HEIGHT: 69 IN

## 2023-02-06 DIAGNOSIS — J44.1 COPD EXACERBATION (HCC): Primary | ICD-10-CM

## 2023-02-06 LAB
ALBUMIN SERPL-MCNC: 3.5 G/DL (ref 3.4–5)
ALBUMIN/GLOB SERPL: 1 (ref 0.8–1.7)
ALP SERPL-CCNC: 132 U/L (ref 45–117)
ALT SERPL-CCNC: 13 U/L (ref 16–61)
ANION GAP SERPL CALC-SCNC: 8 MMOL/L (ref 3–18)
AST SERPL-CCNC: 12 U/L (ref 10–38)
BASOPHILS # BLD: 0.1 K/UL (ref 0–0.1)
BASOPHILS NFR BLD: 0 % (ref 0–2)
BILIRUB SERPL-MCNC: 0.7 MG/DL (ref 0.2–1)
BUN SERPL-MCNC: 27 MG/DL (ref 7–18)
BUN/CREAT SERPL: 20 (ref 12–20)
CALCIUM SERPL-MCNC: 8.7 MG/DL (ref 8.5–10.1)
CHLORIDE SERPL-SCNC: 106 MMOL/L (ref 100–111)
CO2 SERPL-SCNC: 26 MMOL/L (ref 21–32)
CREAT SERPL-MCNC: 1.35 MG/DL (ref 0.6–1.3)
DIFFERENTIAL METHOD BLD: ABNORMAL
EOSINOPHIL # BLD: 1.1 K/UL (ref 0–0.4)
EOSINOPHIL NFR BLD: 8 % (ref 0–5)
ERYTHROCYTE [DISTWIDTH] IN BLOOD BY AUTOMATED COUNT: 14.1 % (ref 11.6–14.5)
GLOBULIN SER CALC-MCNC: 3.4 G/DL (ref 2–4)
GLUCOSE SERPL-MCNC: 113 MG/DL (ref 74–99)
HCT VFR BLD AUTO: 34.7 % (ref 36–48)
HGB BLD-MCNC: 11.4 G/DL (ref 13–16)
IMM GRANULOCYTES # BLD AUTO: 0.1 K/UL (ref 0–0.04)
IMM GRANULOCYTES NFR BLD AUTO: 1 % (ref 0–0.5)
LACTATE SERPL-SCNC: 0.9 MMOL/L (ref 0.4–2)
LYMPHOCYTES # BLD: 1.2 K/UL (ref 0.9–3.6)
LYMPHOCYTES NFR BLD: 9 % (ref 21–52)
MCH RBC QN AUTO: 28.1 PG (ref 24–34)
MCHC RBC AUTO-ENTMCNC: 32.9 G/DL (ref 31–37)
MCV RBC AUTO: 85.7 FL (ref 78–100)
MONOCYTES # BLD: 1 K/UL (ref 0.05–1.2)
MONOCYTES NFR BLD: 8 % (ref 3–10)
NEUTS SEG # BLD: 10.1 K/UL (ref 1.8–8)
NEUTS SEG NFR BLD: 74 % (ref 40–73)
NRBC # BLD: 0 K/UL (ref 0–0.01)
NRBC BLD-RTO: 0 PER 100 WBC
PLATELET # BLD AUTO: 326 K/UL (ref 135–420)
PMV BLD AUTO: 9 FL (ref 9.2–11.8)
POTASSIUM SERPL-SCNC: 4.2 MMOL/L (ref 3.5–5.5)
PROT SERPL-MCNC: 6.9 G/DL (ref 6.4–8.2)
RBC # BLD AUTO: 4.05 M/UL (ref 4.35–5.65)
SODIUM SERPL-SCNC: 140 MMOL/L (ref 136–145)
TROPONIN I SERPL HS-MCNC: 7 NG/L (ref 0–78)
WBC # BLD AUTO: 13.6 K/UL (ref 4.6–13.2)

## 2023-02-06 PROCEDURE — 80053 COMPREHEN METABOLIC PANEL: CPT

## 2023-02-06 PROCEDURE — 99284 EMERGENCY DEPT VISIT MOD MDM: CPT | Performed by: EMERGENCY MEDICINE

## 2023-02-06 PROCEDURE — 87040 BLOOD CULTURE FOR BACTERIA: CPT

## 2023-02-06 PROCEDURE — 74011000250 HC RX REV CODE- 250: Performed by: EMERGENCY MEDICINE

## 2023-02-06 PROCEDURE — 71045 X-RAY EXAM CHEST 1 VIEW: CPT

## 2023-02-06 PROCEDURE — 85025 COMPLETE CBC W/AUTO DIFF WBC: CPT

## 2023-02-06 PROCEDURE — 94640 AIRWAY INHALATION TREATMENT: CPT | Performed by: EMERGENCY MEDICINE

## 2023-02-06 PROCEDURE — 84484 ASSAY OF TROPONIN QUANT: CPT

## 2023-02-06 PROCEDURE — 96374 THER/PROPH/DIAG INJ IV PUSH: CPT | Performed by: EMERGENCY MEDICINE

## 2023-02-06 PROCEDURE — 83605 ASSAY OF LACTIC ACID: CPT

## 2023-02-06 PROCEDURE — 74011250636 HC RX REV CODE- 250/636: Performed by: EMERGENCY MEDICINE

## 2023-02-06 RX ORDER — IPRATROPIUM BROMIDE AND ALBUTEROL SULFATE 2.5; .5 MG/3ML; MG/3ML
3 SOLUTION RESPIRATORY (INHALATION)
Status: COMPLETED | OUTPATIENT
Start: 2023-02-06 | End: 2023-02-06

## 2023-02-06 RX ORDER — ALBUTEROL SULFATE 0.83 MG/ML
2.5 SOLUTION RESPIRATORY (INHALATION)
Qty: 30 EACH | Refills: 1 | Status: SHIPPED | OUTPATIENT
Start: 2023-02-06

## 2023-02-06 RX ORDER — PREDNISONE 20 MG/1
60 TABLET ORAL DAILY
Qty: 15 TABLET | Refills: 0 | Status: SHIPPED | OUTPATIENT
Start: 2023-02-06 | End: 2023-02-11

## 2023-02-06 RX ORDER — ALBUTEROL SULFATE 90 UG/1
2 AEROSOL, METERED RESPIRATORY (INHALATION)
Qty: 1 EACH | Refills: 3 | Status: SHIPPED | OUTPATIENT
Start: 2023-02-06

## 2023-02-06 RX ADMIN — IPRATROPIUM BROMIDE AND ALBUTEROL SULFATE 3 ML: .5; 3 SOLUTION RESPIRATORY (INHALATION) at 03:19

## 2023-02-06 RX ADMIN — IPRATROPIUM BROMIDE AND ALBUTEROL SULFATE 3 ML: .5; 3 SOLUTION RESPIRATORY (INHALATION) at 03:20

## 2023-02-06 RX ADMIN — METHYLPREDNISOLONE SODIUM SUCCINATE 125 MG: 125 INJECTION, POWDER, FOR SOLUTION INTRAMUSCULAR; INTRAVENOUS at 03:19

## 2023-02-06 NOTE — ED PROVIDER NOTES
EMERGENCY DEPARTMENT HISTORY AND PHYSICAL EXAM    Date: 2/6/2023  Patient Name: Keith Craig    History of Presenting Illness     Chief Complaint   Patient presents with    Shortness of Breath         History Provided By: Patient and EMS    Additional History (Context):   2:53 AM  Keith Craig is a 76 y.o. male with PMHX of hypertension, COPD, CHF, cerebral aneurysm, prostate cancer, chronic anemia, hard hearing, medication noncompliance, sleep apnea who presents to the emergency department C/O difficulty breathing. Patient was transferred to the emergency department after being called by paramedics for respiratory difficulty. He was given single breathing treatment and transported supplemental nasal cannula. He arrives speaking in 7-10 word sentences. He knowledges COPD history and relates this is his typical attack. He often uses oxygen at home at 2 L as needed. He denies any chest pain fevers or purulent sputum. He denies any abdominal pain nausea vomiting diarrhea. He has no other complaints    Social History  He is a former smoker no longer smokes. Denies alcohol or drug use. Family History  Mother with heart disease. PCP: Marley Linn MD    Current Outpatient Medications   Medication Sig Dispense Refill    predniSONE (DELTASONE) 20 mg tablet Take 3 Tablets by mouth daily for 5 days. With Breakfast 15 Tablet 0    albuterol (PROVENTIL HFA, VENTOLIN HFA, PROAIR HFA) 90 mcg/actuation inhaler Take 2 Puffs by inhalation every four (4) hours as needed for Wheezing or Shortness of Breath. 1 Each 3    albuterol (PROVENTIL VENTOLIN) 2.5 mg /3 mL (0.083 %) nebu 3 mL by Nebulization route every four (4) hours as needed for Wheezing, Shortness of Breath or Cough. 30 Each 1    albuterol-ipratropium (DUO-NEB) 2.5 mg-0.5 mg/3 ml nebu 3 mL by Nebulization route every four (4) hours as needed for Wheezing. 10 Each 0    DAPTOMYCIN  mg by IntraVENous route daily.  in 25mL NS      sodium chloride (Normal Saline Flush) 5-10 mL by IntraVENous route daily. flush IV catheter with 10ml before and after infusing each dose of medication as directed. heparin sodium,porcine (HEPARIN LOCK FLUSH IV) 3 mL by IntraVENous route daily. flush IV catheter with 3ml of heparin 10units/mL after the last dose of 0.9% sodium chloride flush, after each dose of medication or as directed. flush IV catheter every day if not in use. ferrous sulfate 325 mg (65 mg iron) tablet Take 1 Tablet by mouth two (2) times daily (with meals). 60 Tablet 3    apixaban (ELIQUIS) 5 mg tablet Take 1 Tablet by mouth two (2) times a day. 60 Tablet 2    tamsulosin (FLOMAX) 0.4 mg capsule Take 1 Capsule by mouth every evening. 30 Capsule 3    furosemide (Lasix) 20 mg tablet Take 1 Tablet by mouth daily. 30 Tablet 3    amLODIPine (NORVASC) 10 mg tablet Take 1 Tablet by mouth daily. 30 Tablet 3    pantoprazole (PROTONIX) 40 mg tablet Take 1 Tablet by mouth Before breakfast and dinner. 60 Tablet 0    OXYGEN-AIR DELIVERY SYSTEMS 2 L/min by Nasal route daily as needed for PRN Reason (Other) (SOB/wheezing). dextran 70/hypromellose (ARTIFICIAL TEARS, PF, OP) Administer 2 Drops to both eyes daily as needed for Other (dry eyes).          Past History     Past Medical History:  Past Medical History:   Diagnosis Date    BMI 30.0-30.9,adult 4/2/2022    Brain aneurysm     Brain aneurysm     Cancer [facility])     prostate    CHF (congestive heart failure) ([facility])     CKD (chronic kidney disease)     Closed nondisplaced supracondylar fracture of lower end of left femur without intracondylar extension with delayed healing     COPD (chronic obstructive pulmonary disease) ([address]     Debility     History of home oxygen therapy     [place] (hard of hearing)     Hypertension     Nocturia     On home oxygen therapy     PAF (paroxysmal atrial fibrillation) (HCC)     Pneumonia     Prostate CA ([address]     Prostate neoplasm     Radiation effect        Past Surgical History:  Past Surgical History:   Procedure Laterality Date    HX HERNIA REPAIR      HX HIP ARTHROSCOPY  04/09/2021       Family History:  Family History   Problem Relation Age of Onset    Heart Disease Mother        Social History:  Social History     Tobacco Use    Smoking status: Former     Types: Cigarettes    Smokeless tobacco: Never   Substance Use Topics    Alcohol use: No    Drug use: Never       Allergies: Allergies   Allergen Reactions    Aspirin Other (comments)     \"messes my stomach up\"    Bactrim [Sulfamethoxazole-Trimethoprim] Unknown (comments)    Nsaids (Non-Steroidal Anti-Inflammatory Drug) Unknown (comments)         Review of Systems   Review of Systems   Respiratory:  Positive for cough, shortness of breath and wheezing. Physical Exam     Vitals:    02/06/23 0251 02/06/23 0330 02/06/23 0437 02/06/23 0601   BP: (!) 134/51   (!) 138/39   Pulse: 85 80 61 79   Resp: 22 20 23 16   Temp: 97.9 °F (36.6 °C)      SpO2: 97% 100% 93% 95%   Weight: 85.7 kg (189 lb)      Height: 5' 9\" (1.753 m)        Physical Exam  Vitals and nursing note reviewed. Constitutional:       General: He is not in acute distress. Appearance: He is well-developed. He is not diaphoretic. Interventions: Nasal cannula in place. Comments: Elderly chronically ill-appearing but nontoxic. Speaks in 7-10 word sentences. HENT:      Head: Normocephalic and atraumatic. Eyes:      General: No scleral icterus. Extraocular Movements:      Right eye: Normal extraocular motion. Left eye: Normal extraocular motion. Conjunctiva/sclera: Conjunctivae normal.   Neck:      Trachea: No tracheal deviation. Cardiovascular:      Rate and Rhythm: Normal rate and regular rhythm. Heart sounds: Normal heart sounds. Pulmonary:      Effort: Tachypnea and accessory muscle usage present. No respiratory distress. Breath sounds: No stridor. Examination of the right-upper field reveals wheezing.  Examination of the left-upper field reveals wheezing. Examination of the right-middle field reveals wheezing. Examination of the left-middle field reveals wheezing. Examination of the right-lower field reveals wheezing. Examination of the left-lower field reveals wheezing. Wheezing present. Abdominal:      General: Bowel sounds are normal. There is no distension. Palpations: Abdomen is soft. Tenderness: There is no abdominal tenderness. There is no rebound. Musculoskeletal:         General: No tenderness. Normal range of motion. Cervical back: Normal range of motion and neck supple. Comments: Grossly unremarkable without abnormalities   Skin:     General: Skin is warm and dry. Capillary Refill: Capillary refill takes less than 2 seconds. Findings: No erythema or rash. Neurological:      Mental Status: He is alert and oriented to person, place, and time. Cranial Nerves: No cranial nerve deficit. Motor: No weakness. Psychiatric:         Attention and Perception: Attention normal.         Mood and Affect: Mood normal.         Behavior: Behavior normal.         Thought Content: Thought content normal.         Judgment: Judgment normal.     Diagnostic Study Results     Labs -  Recent Results (from the past 24 hour(s))   CBC WITH AUTOMATED DIFF    Collection Time: 02/06/23  3:03 AM   Result Value Ref Range    WBC 13.6 (H) 4.6 - 13.2 K/uL    RBC 4.05 (L) 4.35 - 5.65 M/uL    HGB 11.4 (L) 13.0 - 16.0 g/dL    HCT 34.7 (L) 36.0 - 48.0 %    MCV 85.7 78.0 - 100.0 FL    MCH 28.1 24.0 - 34.0 PG    MCHC 32.9 31.0 - 37.0 g/dL    RDW 14.1 11.6 - 14.5 %    PLATELET 447 330 - 577 K/uL    MPV 9.0 (L) 9.2 - 11.8 FL    NRBC 0.0 0  WBC    ABSOLUTE NRBC 0.00 0.00 - 0.01 K/uL    NEUTROPHILS 74 (H) 40 - 73 %    LYMPHOCYTES 9 (L) 21 - 52 %    MONOCYTES 8 3 - 10 %    EOSINOPHILS 8 (H) 0 - 5 %    BASOPHILS 0 0 - 2 %    IMMATURE GRANULOCYTES 1 (H) 0.0 - 0.5 %    ABS. NEUTROPHILS 10.1 (H) 1.8 - 8.0 K/UL    ABS.  LYMPHOCYTES 1.2 0.9 - 3.6 K/UL    ABS. MONOCYTES 1.0 0.05 - 1.2 K/UL    ABS. EOSINOPHILS 1.1 (H) 0.0 - 0.4 K/UL    ABS. BASOPHILS 0.1 0.0 - 0.1 K/UL    ABS. IMM. GRANS. 0.1 (H) 0.00 - 0.04 K/UL    DF AUTOMATED     METABOLIC PANEL, COMPREHENSIVE    Collection Time: 02/06/23  3:03 AM   Result Value Ref Range    Sodium 140 136 - 145 mmol/L    Potassium 4.2 3.5 - 5.5 mmol/L    Chloride 106 100 - 111 mmol/L    CO2 26 21 - 32 mmol/L    Anion gap 8 3.0 - 18 mmol/L    Glucose 113 (H) 74 - 99 mg/dL    BUN 27 (H) 7.0 - 18 MG/DL    Creatinine 1.35 (H) 0.6 - 1.3 MG/DL    BUN/Creatinine ratio 20 12 - 20      eGFR 53 (L) >60 ml/min/1.73m2    Calcium 8.7 8.5 - 10.1 MG/DL    Bilirubin, total 0.7 0.2 - 1.0 MG/DL    ALT (SGPT) 13 (L) 16 - 61 U/L    AST (SGOT) 12 10 - 38 U/L    Alk. phosphatase 132 (H) 45 - 117 U/L    Protein, total 6.9 6.4 - 8.2 g/dL    Albumin 3.5 3.4 - 5.0 g/dL    Globulin 3.4 2.0 - 4.0 g/dL    A-G Ratio 1.0 0.8 - 1.7     LACTIC ACID    Collection Time: 02/06/23  3:03 AM   Result Value Ref Range    Lactic acid 0.9 0.4 - 2.0 MMOL/L   TROPONIN-HIGH SENSITIVITY    Collection Time: 02/06/23  3:03 AM   Result Value Ref Range    Troponin-High Sensitivity 7 0 - 78 ng/L        Radiologic Studies -   Preliminary findings  Portable chest x-ray  No visible infiltrate cardiomegaly or edema. Final radiology report pending  Kerline Cao MD    XR CHEST PORT   Final Result   No interval change. CT Results  (Last 48 hours)      None          CXR Results  (Last 48 hours)                 02/06/23 0520  XR CHEST PORT Final result    Impression:  No interval change. Narrative:  INDICATION: COPD exacerbation. Portable AP view of the chest.       Direct comparison made to prior chest x-ray dated 1/31/2023. Cardiomediastinal silhouette is stable. There is stable right lung volume loss. There is a stable right calcified pleural plaque. There is persistent right   basilar patchy airspace disease, unchanged. Left lung is clear. Left pleural   space is normal.                    Medications given in the ED-  Medications   methylPREDNISolone (PF) (Solu-MEDROL) injection 125 mg (125 mg IntraVENous Given 2/6/23 0319)   albuterol-ipratropium (DUO-NEB) 2.5 MG-0.5 MG/3 ML (3 mL Nebulization Given 2/6/23 0320)   albuterol-ipratropium (DUO-NEB) 2.5 MG-0.5 MG/3 ML (3 mL Nebulization Given 2/6/23 0319)         Medical Decision Making   I am the first provider for this patient. I reviewed the vital signs, available nursing notes, past medical history, past surgical history, family history and social history. Vital Signs-Reviewed the patient's vital signs. Pulse Oximetry Analysis -97% on 4 L nasal cannula while receiving breathing treatment    Pulse oximetry prior to discharge. 96% on 1 L nasal cannula    Cardiac Monitor:  Rate: 83 bpm  Rhythm: Sinus rhythm        Records Reviewed: NURSING NOTES AND PREVIOUS MEDICAL RECORDS    Provider Notes (Medical Decision Making):   Patient with COPD exacerbation brought in with breathing treatment. He never required BiPAP or CPAP. Heart shows no tachycardia but extensive inspiratory and expiratory wheezing. He was given multiple breathing treatments with significant improvement in condition. X-ray showed no evidence of infiltrate or edema or pneumonia. Cardiac monitor analysis demonstrated no arrhythmia. Cardiac troponins came back unremarkable. Sepsis analysis was performed with normal lactic acid blood culture sent. Salts electrolytes came back unremarkable except for mild jump in blood sugar at 113, BUN/creatinine 27 and 1.35 respectively sugars overall stable CKD 2 or 3 a condition given age. Procedures:  Procedures    ED Course:   2:53 AM: Initial assessment performed. The patients presenting problems have been discussed, and they are in agreement with the care plan formulated and outlined with them.   I have encouraged them to ask questions as they arise throughout their visit. CRITICAL CARE NOTE:  6:48 AM  I have spent 42 minutes of critical care time involved in lab review, consultations with specialist, family decision-making, and documentation, not including separate billable procedures. During this entire length of time I was immediately available to the patient. Critical Care: The reason for providing this level of medical care for this critically ill patient was due a critical illness that impaired one or more vital organ systems such that there was a high probability of imminent or life threatening deterioration in the patients condition. This care involved high complexity decision making to assess, manipulate, and support vital system functions, to treat this degreee vital organ system failure and to prevent further life threatening deterioration of the patients condition. Diagnosis and Disposition       DISCHARGE NOTE:  7:25 AM  Shy Park's  results have been reviewed with him. He has been counseled regarding his diagnosis, treatment, and plan. He verbally conveys understanding and agreement of the signs, symptoms, diagnosis, treatment and prognosis and additionally agrees to follow up as discussed. He also agrees with the care-plan and conveys that all of his questions have been answered. I have also provided discharge instructions for him that include: educational information regarding their diagnosis and treatment, and list of reasons why they would want to return to the ED prior to their follow-up appointment, should his condition change. He has been provided with education for proper emergency department utilization. CLINICAL IMPRESSION:    1. COPD exacerbation ([facility] [address]        PLAN:  1. D/C Home  2. Current Discharge Medication List        START taking these medications    Details   predniSONE (DELTASONE) 20 mg tablet Take 3 Tablets by mouth daily for 5 days.  With Breakfast  Qty: 15 Tablet, Refills: 0  Start date: 2/6/2023, End date: 2/11/2023           CONTINUE these medications which have CHANGED    Details   albuterol (PROVENTIL HFA, VENTOLIN HFA, PROAIR HFA) 90 mcg/actuation inhaler Take 2 Puffs by inhalation every four (4) hours as needed for Wheezing or Shortness of Breath. Qty: 1 Each, Refills: 3  Start date: 2/6/2023      albuterol (PROVENTIL VENTOLIN) 2.5 mg /3 mL (0.083 %) nebu 3 mL by Nebulization route every four (4) hours as needed for Wheezing, Shortness of Breath or Cough. Qty: 30 Each, Refills: 1  Start date: 2/6/2023           3. Follow-up Information       Follow up With Specialties Details Why Contact Info    Eamon Cowan MD Family Medicine In 5 days  [address] [facility] [address]  [phone]            _______________________________    This note was partially transcribed via voice recognition software. Although efforts have been made to catch any discrepancies, it may contain sound alike words, grammatical errors, or nonsensical words.

## 2023-02-12 LAB
BACTERIA SPEC CULT: NORMAL
SERVICE CMNT-IMP: NORMAL

## 2023-02-20 NOTE — ROUTINE PROCESS
Bedside and Verbal shift change report given to CHRIS Nelson RN (oncoming nurse) by Primo Maldonado RN (offgoing nurse). Report included the following information SBAR, Kardex, Intake/Output and MAR. Detail Level: Detailed

## 2023-03-13 ENCOUNTER — APPOINTMENT (OUTPATIENT)
Facility: HOSPITAL | Age: 78
End: 2023-03-13
Payer: MEDICARE

## 2023-03-13 ENCOUNTER — HOSPITAL ENCOUNTER (INPATIENT)
Facility: HOSPITAL | Age: 78
LOS: 4 days | Discharge: NURSING, REHAB OR LONG-TERM CARE | End: 2023-03-17
Attending: EMERGENCY MEDICINE | Admitting: HOSPITALIST
Payer: MEDICARE

## 2023-03-13 DIAGNOSIS — J44.1 COPD EXACERBATION (HCC): Primary | ICD-10-CM

## 2023-03-13 PROBLEM — J96.21 ACUTE ON CHRONIC RESPIRATORY FAILURE WITH HYPOXEMIA (HCC): Status: ACTIVE | Noted: 2019-08-19

## 2023-03-13 PROBLEM — N18.30 CHRONIC RENAL DISEASE, STAGE 3, MODERATELY DECREASED GLOMERULAR FILTRATION RATE BETWEEN 30-59 ML/MIN/1.73 SQUARE METER (HCC): Status: ACTIVE | Noted: 2018-07-20

## 2023-03-13 PROBLEM — J44.9 COPD (CHRONIC OBSTRUCTIVE PULMONARY DISEASE) (HCC): Status: ACTIVE | Noted: 2023-03-13

## 2023-03-13 PROBLEM — H91.93 BILATERAL DEAFNESS: Status: ACTIVE | Noted: 2022-02-03

## 2023-03-13 PROBLEM — I48.0 PAF (PAROXYSMAL ATRIAL FIBRILLATION) (HCC): Status: ACTIVE | Noted: 2021-04-05

## 2023-03-13 LAB
ANION GAP SERPL CALC-SCNC: 3 MMOL/L (ref 3–18)
BASOPHILS # BLD: 0.1 K/UL (ref 0–0.1)
BASOPHILS NFR BLD: 1 % (ref 0–2)
BUN SERPL-MCNC: 22 MG/DL (ref 7–18)
BUN/CREAT SERPL: 17 (ref 12–20)
CALCIUM SERPL-MCNC: 8.4 MG/DL (ref 8.5–10.1)
CHLORIDE SERPL-SCNC: 108 MMOL/L (ref 100–111)
CO2 SERPL-SCNC: 28 MMOL/L (ref 21–32)
CREAT SERPL-MCNC: 1.31 MG/DL (ref 0.6–1.3)
D DIMER PPP FEU-MCNC: 0.64 UG/ML(FEU)
DIFFERENTIAL METHOD BLD: ABNORMAL
EOSINOPHIL # BLD: 0.8 K/UL (ref 0–0.4)
EOSINOPHIL NFR BLD: 9 % (ref 0–5)
ERYTHROCYTE [DISTWIDTH] IN BLOOD BY AUTOMATED COUNT: 14.5 % (ref 11.6–14.5)
FLUAV RNA SPEC QL NAA+PROBE: NOT DETECTED
FLUBV RNA SPEC QL NAA+PROBE: NOT DETECTED
GLUCOSE BLD STRIP.AUTO-MCNC: 236 MG/DL (ref 70–110)
GLUCOSE SERPL-MCNC: 90 MG/DL (ref 74–99)
HCT VFR BLD AUTO: 37.9 % (ref 36–48)
HGB BLD-MCNC: 11.9 G/DL (ref 13–16)
IMM GRANULOCYTES # BLD AUTO: 0.1 K/UL (ref 0–0.04)
IMM GRANULOCYTES NFR BLD AUTO: 1 % (ref 0–0.5)
LYMPHOCYTES # BLD: 1.1 K/UL (ref 0.9–3.6)
LYMPHOCYTES NFR BLD: 12 % (ref 21–52)
MCH RBC QN AUTO: 27.6 PG (ref 24–34)
MCHC RBC AUTO-ENTMCNC: 31.4 G/DL (ref 31–37)
MCV RBC AUTO: 87.9 FL (ref 78–100)
MONOCYTES # BLD: 0.7 K/UL (ref 0.05–1.2)
MONOCYTES NFR BLD: 7 % (ref 3–10)
NEUTS SEG # BLD: 6.3 K/UL (ref 1.8–8)
NEUTS SEG NFR BLD: 70 % (ref 40–73)
NRBC # BLD: 0 K/UL (ref 0–0.01)
NRBC BLD-RTO: 0 PER 100 WBC
NT PRO BNP: 717 PG/ML (ref 0–1800)
PLATELET # BLD AUTO: 338 K/UL (ref 135–420)
PMV BLD AUTO: 8.7 FL (ref 9.2–11.8)
POTASSIUM SERPL-SCNC: 4.6 MMOL/L (ref 3.5–5.5)
RBC # BLD AUTO: 4.31 M/UL (ref 4.35–5.65)
SARS-COV-2 RNA RESP QL NAA+PROBE: NOT DETECTED
SODIUM SERPL-SCNC: 139 MMOL/L (ref 136–145)
TROPONIN I SERPL HS-MCNC: 6 NG/L (ref 0–78)
WBC # BLD AUTO: 8.9 K/UL (ref 4.6–13.2)

## 2023-03-13 PROCEDURE — 6370000000 HC RX 637 (ALT 250 FOR IP): Performed by: HOSPITALIST

## 2023-03-13 PROCEDURE — 6360000002 HC RX W HCPCS: Performed by: EMERGENCY MEDICINE

## 2023-03-13 PROCEDURE — 85379 FIBRIN DEGRADATION QUANT: CPT

## 2023-03-13 PROCEDURE — 71046 X-RAY EXAM CHEST 2 VIEWS: CPT

## 2023-03-13 PROCEDURE — 96374 THER/PROPH/DIAG INJ IV PUSH: CPT

## 2023-03-13 PROCEDURE — 2700000000 HC OXYGEN THERAPY PER DAY

## 2023-03-13 PROCEDURE — 6360000002 HC RX W HCPCS: Performed by: HOSPITALIST

## 2023-03-13 PROCEDURE — 80048 BASIC METABOLIC PNL TOTAL CA: CPT

## 2023-03-13 PROCEDURE — 84484 ASSAY OF TROPONIN QUANT: CPT

## 2023-03-13 PROCEDURE — 96375 TX/PRO/DX INJ NEW DRUG ADDON: CPT

## 2023-03-13 PROCEDURE — 87636 SARSCOV2 & INF A&B AMP PRB: CPT

## 2023-03-13 PROCEDURE — 2580000003 HC RX 258: Performed by: EMERGENCY MEDICINE

## 2023-03-13 PROCEDURE — 99285 EMERGENCY DEPT VISIT HI MDM: CPT

## 2023-03-13 PROCEDURE — 82962 GLUCOSE BLOOD TEST: CPT

## 2023-03-13 PROCEDURE — 2580000003 HC RX 258: Performed by: HOSPITALIST

## 2023-03-13 PROCEDURE — 1100000000 HC RM PRIVATE

## 2023-03-13 PROCEDURE — 85025 COMPLETE CBC W/AUTO DIFF WBC: CPT

## 2023-03-13 PROCEDURE — 93005 ELECTROCARDIOGRAM TRACING: CPT | Performed by: EMERGENCY MEDICINE

## 2023-03-13 PROCEDURE — 83880 ASSAY OF NATRIURETIC PEPTIDE: CPT

## 2023-03-13 PROCEDURE — 94640 AIRWAY INHALATION TREATMENT: CPT

## 2023-03-13 RX ORDER — METHYLPREDNISOLONE SODIUM SUCCINATE 40 MG/ML
40 INJECTION, POWDER, LYOPHILIZED, FOR SOLUTION INTRAMUSCULAR; INTRAVENOUS EVERY 12 HOURS
Status: DISCONTINUED | OUTPATIENT
Start: 2023-03-14 | End: 2023-03-17 | Stop reason: HOSPADM

## 2023-03-13 RX ORDER — FUROSEMIDE 20 MG/1
20 TABLET ORAL DAILY
Status: DISCONTINUED | OUTPATIENT
Start: 2023-03-13 | End: 2023-03-17 | Stop reason: HOSPADM

## 2023-03-13 RX ORDER — FERROUS SULFATE 325(65) MG
325 TABLET ORAL 2 TIMES DAILY WITH MEALS
Status: DISCONTINUED | OUTPATIENT
Start: 2023-03-13 | End: 2023-03-17 | Stop reason: HOSPADM

## 2023-03-13 RX ORDER — ARFORMOTEROL TARTRATE 15 UG/2ML
15 SOLUTION RESPIRATORY (INHALATION) 2 TIMES DAILY
Status: DISCONTINUED | OUTPATIENT
Start: 2023-03-13 | End: 2023-03-17 | Stop reason: HOSPADM

## 2023-03-13 RX ORDER — PANTOPRAZOLE SODIUM 40 MG/1
40 TABLET, DELAYED RELEASE ORAL
Status: DISCONTINUED | OUTPATIENT
Start: 2023-03-14 | End: 2023-03-17 | Stop reason: HOSPADM

## 2023-03-13 RX ORDER — IPRATROPIUM BROMIDE AND ALBUTEROL SULFATE 2.5; .5 MG/3ML; MG/3ML
3 SOLUTION RESPIRATORY (INHALATION) EVERY 4 HOURS PRN
Status: DISCONTINUED | OUTPATIENT
Start: 2023-03-13 | End: 2023-03-17 | Stop reason: HOSPADM

## 2023-03-13 RX ORDER — AMLODIPINE BESYLATE 5 MG/1
10 TABLET ORAL DAILY
Status: DISCONTINUED | OUTPATIENT
Start: 2023-03-13 | End: 2023-03-17 | Stop reason: HOSPADM

## 2023-03-13 RX ORDER — ALBUTEROL SULFATE 2.5 MG/3ML
5 SOLUTION RESPIRATORY (INHALATION)
Status: COMPLETED | OUTPATIENT
Start: 2023-03-13 | End: 2023-03-13

## 2023-03-13 RX ORDER — BUDESONIDE 0.25 MG/2ML
0.25 INHALANT ORAL 2 TIMES DAILY
Status: DISCONTINUED | OUTPATIENT
Start: 2023-03-13 | End: 2023-03-17 | Stop reason: HOSPADM

## 2023-03-13 RX ORDER — TAMSULOSIN HYDROCHLORIDE 0.4 MG/1
0.4 CAPSULE ORAL EVERY EVENING
Status: DISCONTINUED | OUTPATIENT
Start: 2023-03-13 | End: 2023-03-17 | Stop reason: HOSPADM

## 2023-03-13 RX ORDER — METHYLPREDNISOLONE SODIUM SUCCINATE 125 MG/2ML
125 INJECTION, POWDER, LYOPHILIZED, FOR SOLUTION INTRAMUSCULAR; INTRAVENOUS
Status: COMPLETED | OUTPATIENT
Start: 2023-03-13 | End: 2023-03-13

## 2023-03-13 RX ADMIN — IPRATROPIUM BROMIDE 1 MG: 0.5 SOLUTION RESPIRATORY (INHALATION) at 13:10

## 2023-03-13 RX ADMIN — ALBUTEROL SULFATE 5 MG: 2.5 SOLUTION RESPIRATORY (INHALATION) at 13:10

## 2023-03-13 RX ADMIN — AMLODIPINE BESYLATE 10 MG: 5 TABLET ORAL at 20:57

## 2023-03-13 RX ADMIN — ARFORMOTEROL TARTRATE 15 MCG: 15 SOLUTION RESPIRATORY (INHALATION) at 23:54

## 2023-03-13 RX ADMIN — APIXABAN 5 MG: 5 TABLET, FILM COATED ORAL at 20:57

## 2023-03-13 RX ADMIN — BUDESONIDE 250 MCG: 0.25 INHALANT RESPIRATORY (INHALATION) at 23:54

## 2023-03-13 RX ADMIN — AZITHROMYCIN MONOHYDRATE 500 MG: 500 INJECTION, POWDER, LYOPHILIZED, FOR SOLUTION INTRAVENOUS at 15:06

## 2023-03-13 RX ADMIN — FUROSEMIDE 20 MG: 20 TABLET ORAL at 20:57

## 2023-03-13 RX ADMIN — TAMSULOSIN HYDROCHLORIDE 0.4 MG: 0.4 CAPSULE ORAL at 20:57

## 2023-03-13 RX ADMIN — IPRATROPIUM BROMIDE AND ALBUTEROL SULFATE 3 ML: .5; 3 SOLUTION RESPIRATORY (INHALATION) at 23:54

## 2023-03-13 RX ADMIN — FERROUS SULFATE TAB 325 MG (65 MG ELEMENTAL FE) 325 MG: 325 (65 FE) TAB at 20:57

## 2023-03-13 RX ADMIN — METHYLPREDNISOLONE SODIUM SUCCINATE 125 MG: 125 INJECTION, POWDER, FOR SOLUTION INTRAMUSCULAR; INTRAVENOUS at 13:09

## 2023-03-13 RX ADMIN — CEFTRIAXONE 1000 MG: 1 INJECTION, POWDER, FOR SOLUTION INTRAMUSCULAR; INTRAVENOUS at 20:57

## 2023-03-13 ASSESSMENT — PAIN SCALES - GENERAL: PAINLEVEL_OUTOF10: 0

## 2023-03-13 ASSESSMENT — PAIN - FUNCTIONAL ASSESSMENT: PAIN_FUNCTIONAL_ASSESSMENT: 0-10

## 2023-03-13 NOTE — ED NOTES
Pt being transported to floor after multiple attempts to call. Floor called x3 times and no answer.  Pt going upstairs per ED protocol      Kelley Kolb RN  03/13/23 [id number]

## 2023-03-13 NOTE — PROGRESS NOTES
Bedside shift change report given to Earlene Rose (oncoming nurse) by Laura Gong RN (offgoing nurse). Report included the following information Nurse Handoff Report and Adult Overview.

## 2023-03-13 NOTE — PROGRESS NOTES
Paged Kya Oconnor in regards of Home medications, unable to reach he. Patient unable to recall medications he takes at home  [id number] Claudio aware.

## 2023-03-13 NOTE — ED TRIAGE NOTES
Pt arrives via ems stretcher with c\o SOB x 1 day, per ems pt was 84% on RA upon their arrival, pt recvd a&a tx en route, pt with increased WOB upon arrival to ED aeb accessory muscle use, pt is able to make needs known speaking in 3-4 word sentences

## 2023-03-13 NOTE — ED PROVIDER NOTES
[facility] EMERGENCY DEPT  EMERGENCY DEPARTMENT ENCOUNTER    Patient Name: Natividad Fuentes  MRN: [id number]  YOB: 1945  Provider: Jacquelyn Field MD  PCP: Palmer Chawla MD   Time/Date of evaluation: 12:56 PM EDT on 3/13/23    History of Presenting Illness     Chief Complaint   Patient presents with    Shortness of Breath       History Provided by: Patient   History is limited by: Acuity of Condition     HISTORY:   Natividad Fuentes is a 76 y.o. male with past medical history of COPD and CHF who presents with worsening shortness of breath since yesterday. He has a history of COPD and says this feels like his normal COPD that started getting worse last night. He does have home oxygen which she uses intermittently as needed. He has been taking breathing treatments at home every 2-3 hours which helps some but his symptoms return shortly. He has not had any fevers. He has had a cough which has been nonproductive. He denies any chest pain. History is somewhat limited as the patient is very hard of hearing and short of breath. He does have chronic swelling in his bilateral ankles which he says is about at baseline. He arrives via EMS. When they arrived at the house his oxygen saturation was 82% on room air, this did improve with a breathing treatment in route. He reports improved symptoms since the breathing treatment. Nursing Notes were all reviewed and agreed with or any disagreements were addressed in the HPI.     Past History     PAST MEDICAL HISTORY:  Past Medical History:   Diagnosis Date    BMI 30.0-30.9,adult 4/2/2022    Brain aneurysm     Brain aneurysm     Cancer [facility])     prostate    CHF (congestive heart failure) (HCC)     CKD (chronic kidney disease)     Closed nondisplaced supracondylar fracture of lower end of left femur without intracondylar extension with delayed healing     COPD (chronic obstructive pulmonary disease) ([facility] [address]     Debility     History of home oxygen therapy     [facility] (hard of hearing)     Hypertension     Nocturia     On home oxygen therapy     PAF (paroxysmal atrial fibrillation) (HCC)     Pneumonia     Prostate CA ([address]     Prostate neoplasm     Radiation effect        PAST SURGICAL HISTORY:  Past Surgical History:   Procedure Laterality Date    HERNIA REPAIR      HIP ARTHROSCOPY  04/09/2021       FAMILY HISTORY:  Family History   Problem Relation Age of Onset    Heart Disease Mother        SOCIAL HISTORY:  Social History     Tobacco Use    Smoking status: Former    Smokeless tobacco: Never   Substance Use Topics    Alcohol use: No    Drug use: Never       MEDICATIONS:  Current Facility-Administered Medications   Medication Dose Route Frequency Provider Last Rate Last Admin    albuterol (PROVENTIL) nebulizer solution 5 mg  5 mg Nebulization NOW Burak Perry MD        ipratropium (ATROVENT) 0.02 % nebulizer solution 1 mg  1 mg Nebulization NOW Burak Perry MD        methylPREDNISolone sodium (SOLU-MEDROL) injection 125 mg  125 mg IntraVENous NOW Burak Perry MD         Current Outpatient Medications   Medication Sig Dispense Refill    DAPTOMYCIN IV Infuse 450 mg intravenously daily      albuterol sulfate HFA (PROVENTIL;VENTOLIN;PROAIR) 108 (90 Base) MCG/ACT inhaler Inhale 2 puffs into the lungs every 4 hours as needed      albuterol (PROVENTIL) (2.5 MG/3ML) 0.083% nebulizer solution Inhale 2.5 mg into the lungs every 4 hours as needed      amLODIPine (NORVASC) 10 MG tablet Take 10 mg by mouth daily      apixaban (ELIQUIS) 5 MG TABS tablet Take 5 mg by mouth 2 times daily      ferrous sulfate (IRON 325) 325 (65 Fe) MG tablet Take 325 mg by mouth 2 times daily (with meals)      furosemide (LASIX) 20 MG tablet Take 20 mg by mouth daily      ipratropium-albuterol (DUONEB) 0.5-2.5 (3) MG/3ML SOLN nebulizer solution Inhale 3 mLs into the lungs every 4 hours as needed      pantoprazole (PROTONIX) 40 MG tablet Take 40 mg by mouth 2 times daily (before meals) tamsulosin (FLOMAX) 0.4 MG capsule Take 0.4 mg by mouth every evening         ALLERGIES:  Allergies   Allergen Reactions    Aspirin Other (See Comments)     \"messes my stomach up\"    Nsaids      Other reaction(s): Unknown (comments)    Sulfamethoxazole-Trimethoprim      Other reaction(s): Unknown (comments)       SOCIAL DETERMINANTS OF HEALTH:  Social Determinants of Health     Tobacco Use: Medium Risk    Smoking Tobacco Use: Former    Smokeless Tobacco Use: Never    Passive Exposure: Not on file   Alcohol Use: Not on file   Financial Resource Strain: Not on file   Food Insecurity: Not on file   Transportation Needs: Not on file   Physical Activity: Not on file   Stress: Not on file   Social Connections: Not on file   Intimate Partner Violence: Not on file   Depression: Not on file   Housing Stability: Not on file       Review of Systems     Negative except as listed above in HPI. Physical Exam     Vitals:    03/13/23 1250   BP: 136/68   Pulse: 80   Resp: 22   Temp: 98 °F (36.7 °C)   TempSrc: Oral   SpO2: 100%   Weight: 162 lb (73.5 kg)   Height: 5' 9\" (1.753 m)     Constitutional: Well nourished, well developed, appears stated age. Alert and oriented. HEENT: Neck supple without meningismus. PERRLA, no conjunctival injection. EOM intact  Cardiovascular: RRR, Warm, well-perfused extremities  Respiratory: Expiratory wheezes and diminished breath sounds bilaterally. Abdominal: Soft, nontender, nondistended, no CVA tenderness  Musculoskeletal: Full range of motion all extremities. No deformities. Bilateral 1+ pitting edema. Skin: Warm, dry. No rashes  Vascular: Pulses equal in all 4 extremities. Neuro: CNs II-XII grossly intact. Sensation and motor function of extremities grossly intact. Psych: Appropriate mood and affect. Diagnostic Study Results     LABS:  No results found for this or any previous visit (from the past 12 hour(s)).     RADIOLOGIC STUDIES:   Non x-ray images such as CT, Ultrasound and MRI are read by the radiologist. X-ray images are visualized and preliminarily interpreted by the ED Provider with the findings as listed in the ED Course section below. Interpretation per the Radiologist is listed below, if available at the time of this note:    XR CHEST (2 VW)    (Results Pending)     A 12 lead EKG was performed interpreted by myself. Rate 81, sinus rhythm, normal axis, no significant ST elevations depressions or T wave inversions, QTc 439. Procedures     Procedures    ED Course and Medical Decision Making     12:56 PM EDT I Jabier Neal MD) am the first provider for this patient. Initial assessment performed. I reviewed the vital signs, available nursing notes, past medical history, past surgical history, family history and social history. The patients presenting problems have been discussed, and they are in agreement with the care plan formulated and outlined with them. I have encouraged them to ask questions as they arise throughout their visit. On arrival he did have some shortness of breath and was able to speak but only in short sentences. He did have expiry wheezing bilaterally with diminished breath sounds. I did order initial breathing treatment as well as steroids. EKG showed no evidence of acute ischemia, lab work showed a mildly elevated creatinine appears to be near baseline. proBNP was slightly elevated at 717. Chest x-ray did show a right lower lobe infiltrate which appears unchanged since his previous admission. Due to his lower extremity swelling which was slightly asymmetrical I did order a D-dimer. This was within the age-adjusted range of normal.    On reevaluation he did have shortness of breath however his lung sounds had improved significantly. I did give her azithromycin for anti-inflammatory effects. Due to the patient's low oxygen saturation when EMS arrived, age and poor respiratory status at baseline he will be admitted for COPD.   I spoke to Dr. Munoz who accepted the admission.    DDX: My differential diagnosis included but was not limited to the following: COPD exacerbation, ACS, CHF exacerbation    The decision to perform testing and results were discussed with the patient. I discussed each of these tests and considerations with the patient. They agree with the plan of admission.  All questions were answered and the patient was in agreement with plan.    RECORDS REVIEWED: Nursing Notes    Is this patient to be included in the SEP-1 core measure due to severe sepsis or septic shock? No Exclusion criteria - the patient is NOT to be included for SEP-1 Core Measure due to: Infection is not suspected    MEDICATIONS ADMINISTERED IN THE ED:  Medications   albuterol (PROVENTIL) nebulizer solution 5 mg (has no administration in time range)   ipratropium (ATROVENT) 0.02 % nebulizer solution 1 mg (has no administration in time range)   methylPREDNISolone sodium (SOLU-MEDROL) injection 125 mg (has no administration in time range)            None    SCREENING TOOLS:  None    ADDITIONAL CONSIDERATIONS:  None    Critical Care Time:     Matt Gudino MD    Diagnosis and Disposition     Clinical Impression:  This appears to be a moderate condition.    1.  COPD exacerbation  2.  Respiratory distress    Disposition: Admit inpatient    Dragon Disclaimer     Please note that this dictation was completed with [employer], the computer voice recognition software.  Quite often unanticipated grammatical, syntax, homophones, and other interpretive errors are inadvertently transcribed by the computer software.  Please disregard these errors.  Please excuse any errors that have escaped final proofreading.    I Matt Gudino MD am the primary clinician of record.  Matt Gudino MD  (Electronically signed)         Matt Gudino MD  03/13/23 1640

## 2023-03-13 NOTE — PROGRESS NOTES
History & Physical    Patient: Dee Maurice MRN: [id number]  CSN: [id number]    YOB: 1945  Age: 76 y.o. Sex: male      DOA: 3/13/2023  Primary Care Provider:  Virginia Moreno MD      Assessment/Plan     Active Hospital Problems    Diagnosis     COPD (chronic obstructive pulmonary disease) ([facility] [J44.9]      Priority: Medium    Bilateral deafness [H91.93]     Acute exacerbation of chronic obstructive pulmonary disease (COPD) ([facility]) [J44.1]     Hypertension [I10]     PAF (paroxysmal atrial fibrillation) ([facility]) [I48.0]     Acute on chronic respiratory failure with hypoxemia ([facility]) [J96.21]     Chronic renal disease, stage 3, moderately decreased glomerular filtration rate between 30-59 mL/min/1.73 square meter ([address] [N18.30]      Admit to medical floor    Acute on chronic respiratory failure-    2nd to COPD exac. On NC O2. COPD with acute exacerbation-    on Brovana and Pulmicort nebulizers   Solumedrol   Neb treatment as needed         Paroxysmal atrial fib  -    rate controlled. on Eliquis. Chronic kidney disease -    Avoid nephrotoxic agents trend BMP        Hard of hearing With early dementia -    high risks for fall       PT and OT consult    DNR    Estimated length of stay : 2-3 days    CC: SOB       HPI:     Dee Maurice is a 76 y.o. male with chronic respiratory failure, COPD, PAF, HTN, presents to ER with concerns of SOB since yesterday. He is very hard of hearing. It has been getting progressively worse. He has been using neb treatment  with no help. associated with productive cough, He denies any fever/chills, chest pain, N/V.  EMS was called and found his oxygen saturation 82%, he received breathing treatment with improvement in symptoms. In ER he received solumedrol and neb treatment and he was still wheezing. CXR with stable right lung infiltrate and volume loss, stable left basilar interstitial prominence.     Past Medical History:   Diagnosis Date    BMI 30.0-30.9,adult 4/2/2022    Brain aneurysm     Brain aneurysm     Cancer [facility])     prostate    CHF (congestive heart failure) (HCC)     CKD (chronic kidney disease)     Closed nondisplaced supracondylar fracture of lower end of left femur without intracondylar extension with delayed healing     COPD (chronic obstructive pulmonary disease) ([facility] [address]     Debility     History of home oxygen therapy     [place] (hard of hearing)     Hypertension     Nocturia     On home oxygen therapy     PAF (paroxysmal atrial fibrillation) ([facility])     Pneumonia     Prostate CA ([facility] [address]     Prostate neoplasm     Radiation effect        Past Surgical History:   Procedure Laterality Date    HERNIA REPAIR      HIP ARTHROSCOPY  04/09/2021       Family History   Problem Relation Age of Onset    Heart Disease Mother        Social History     Socioeconomic History    Marital status:    Tobacco Use    Smoking status: Former    Smokeless tobacco: Never   Substance and Sexual Activity    Alcohol use: No    Drug use: Never       Prior to Admission medications    Medication Sig Start Date End Date Taking?  Authorizing Provider   DAPTOMYCIN IV Infuse 450 mg intravenously daily    Ar Automatic Reconciliation   albuterol sulfate HFA (PROVENTIL;VENTOLIN;PROAIR) 108 (90 Base) MCG/ACT inhaler Inhale 2 puffs into the lungs every 4 hours as needed 5/13/22   Ar Automatic Reconciliation   albuterol (PROVENTIL) (2.5 MG/3ML) 0.083% nebulizer solution Inhale 2.5 mg into the lungs every 4 hours as needed 11/28/22   Ar Automatic Reconciliation   amLODIPine (NORVASC) 10 MG tablet Take 10 mg by mouth daily 4/16/22   Ar Automatic Reconciliation   apixaban (ELIQUIS) 5 MG TABS tablet Take 5 mg by mouth 2 times daily 4/15/22   Ar Automatic Reconciliation   ferrous sulfate (IRON 325) 325 (65 Fe) MG tablet Take 325 mg by mouth 2 times daily (with meals) 4/15/22   Ar Automatic Reconciliation   furosemide (LASIX) 20 MG tablet Take 20 mg by mouth daily 4/15/22   Ar Automatic Reconciliation ipratropium-albuterol (DUONEB) 0.5-2.5 (3) MG/3ML SOLN nebulizer solution Inhale 3 mLs into the lungs every 4 hours as needed 10/14/22   Ar Automatic Reconciliation   pantoprazole (PROTONIX) 40 MG tablet Take 40 mg by mouth 2 times daily (before meals) 4/15/22   Ar Automatic Reconciliation   tamsulosin (FLOMAX) 0.4 MG capsule Take 0.4 mg by mouth every evening 4/15/22   Ar Automatic Reconciliation         Allergies   Allergen Reactions    Aspirin Other (See Comments)     \"messes my stomach up\"    Nsaids      Other reaction(s): Unknown (comments)    Sulfamethoxazole-Trimethoprim      Other reaction(s): Unknown (comments)       Review of Systems  Gen: No fever, chills, malaise, weight loss/gain. Heent: No headache, rhinorrhea, epistaxis, ear pain, sinus pain, neck pain/stiffness, sore throat. Heart: No chest pain, palpitations, LOPEZ, pnd, or orthopnea. Resp: see above. GI: No nausea, vomiting, diarrhea, constipation, melena or hematochezia. : No urinary obstruction, dysuria or hematuria. Derm: No rash, new skin lesion or pruritis. Musc/skeletal: no bone or joint complains. Vasc: No edema, cyanosis or claudication. Endo: No heat/cold intolerance, no polyuria,polydipsia or polyphagia. Neuro: No unilateral weakness, numbness, tingling. No seizures. Heme: No easy bruising or bleeding. Physical Exam:     Physical Exam:  BP (!) 144/78   Pulse 82   Temp 98.2 °F (36.8 °C)   Resp 22   Ht 5' 9\" (1.753 m)   Wt 162 lb (73.5 kg)   SpO2 100%   BMI 23.92 kg/m²         Temp (24hrs), Av.1 °F (36.7 °C), Min:98 °F (36.7 °C), Max:98.2 °F (36.8 °C)    No intake/output data recorded. No intake/output data recorded. General:  Awake, cooperative, no distress. Head:  Normocephalic, without obvious abnormality, atraumatic. Eyes:  Conjunctivae/corneas clear, sclera anicteric, PERRL, EOMs intact. Nose: Nares normal. No drainage or sinus tenderness.    Throat: Lips, mucosa, and tongue normal.    Neck: Supple, symmetrical, trachea midline, no adenopathy. Lungs:   Occasional wheezes bilaterally. Heart:  Regular rate and rhythm, S1, S2 normal, no murmur, click, rub or gallop. Abdomen: Soft, non-tender. Bowel sounds normal. No masses,  No organomegaly. Extremities: Extremities normal, atraumatic, no cyanosis or edema. Capillary refill normal.   Pulses: 2+ and symmetric all extremities. Skin: Skin color pink/pale/mottled/flushed, turgor normal. No rashes or lesions   Neurologic: CNII-XII intact. No focal motor or sensory deficit. Labs Reviewed:  Labs: Results:       Chemistry Recent Labs     03/13/23  1250      K 4.6      CO2 28   BUN 22*   ,  Lab Results   Component Value Date/Time    LACTA 0.9 02/06/2023 03:03 AM      CBC w/Diff Recent Labs     03/13/23  1250   WBC 8.9   RBC 4.31*   HGB 11.9*   HCT 37.9         Cardiac Enzymes Lab Results   Component Value Date    CKTOTAL 28 (L) 05/02/2022    CKMB 1.5 09/30/2021    CKMBINDEX 6.0 (H) 09/30/2021    TROPONINI <0.02 09/30/2021   ,  Lab Results   Component Value Date     01/25/2023      Coagulation No results for input(s): INR, APTT in the last 72 hours. Invalid input(s): PTP    Lipid Panel Lab Results   Component Value Date/Time    CHOL 172 04/02/2022 03:23 AM    HDL 39 04/02/2022 03:23 AM      Pancreas No results for input(s): LIPASE in the last 72 hours. ,No results for input(s): AMYLASE in the last 72 hours. Liver Enzymes No results for input(s): TP, ALB in the last 72 hours. Invalid input(s): TBIL, AP, SGOT, GPT, DBIL   Thyroid Studies Lab Results   Component Value Date/Time    TSH 3.06 04/05/2021 01:30 PM            Procedures/imaging: see electronic medical records for all procedures/Xrays and details which were not copied into this note but were reviewed prior to creation of Plan.       TIME: E/M Time spent with patient and patient care issues: [] 15-20 min  [] 21-30 min  [x] 31-44 min  [] 45 min or more.      This time also includes physician non-face-to-face service time visit on the date of service such as  Preparing to see the patient (eg, review of tests)  Obtaining and/or reviewing separately obtained history  Performing a medically necessary appropriate examination and/or evaluation  Counseling and educating the patient/family/caregiver  Ordering medications, tests, or procedures  Referring and communicating with other health care professionals as needed  Documenting clinical information in the electronic or other health record  Independently interpreting results (not reported separately) and communicating results to the patient/family/caregiver  Care coordination (not reported separately)

## 2023-03-14 LAB
ANION GAP SERPL CALC-SCNC: 8 MMOL/L (ref 3–18)
BUN SERPL-MCNC: 26 MG/DL (ref 7–18)
BUN/CREAT SERPL: 19 (ref 12–20)
CALCIUM SERPL-MCNC: 8.2 MG/DL (ref 8.5–10.1)
CHLORIDE SERPL-SCNC: 107 MMOL/L (ref 100–111)
CO2 SERPL-SCNC: 25 MMOL/L (ref 21–32)
CREAT SERPL-MCNC: 1.4 MG/DL (ref 0.6–1.3)
ERYTHROCYTE [DISTWIDTH] IN BLOOD BY AUTOMATED COUNT: 14 % (ref 11.6–14.5)
GLUCOSE BLD STRIP.AUTO-MCNC: 136 MG/DL (ref 70–110)
GLUCOSE BLD STRIP.AUTO-MCNC: 140 MG/DL (ref 70–110)
GLUCOSE BLD STRIP.AUTO-MCNC: 144 MG/DL (ref 70–110)
GLUCOSE SERPL-MCNC: 158 MG/DL (ref 74–99)
HCT VFR BLD AUTO: 33.6 % (ref 36–48)
HGB BLD-MCNC: 11 G/DL (ref 13–16)
MCH RBC QN AUTO: 27.9 PG (ref 24–34)
MCHC RBC AUTO-ENTMCNC: 32.7 G/DL (ref 31–37)
MCV RBC AUTO: 85.3 FL (ref 78–100)
NRBC # BLD: 0 K/UL (ref 0–0.01)
NRBC BLD-RTO: 0 PER 100 WBC
PLATELET # BLD AUTO: 306 K/UL (ref 135–420)
PMV BLD AUTO: 9.1 FL (ref 9.2–11.8)
POTASSIUM SERPL-SCNC: 4.4 MMOL/L (ref 3.5–5.5)
RBC # BLD AUTO: 3.94 M/UL (ref 4.35–5.65)
SODIUM SERPL-SCNC: 140 MMOL/L (ref 136–145)
WBC # BLD AUTO: 5.6 K/UL (ref 4.6–13.2)

## 2023-03-14 PROCEDURE — 6360000002 HC RX W HCPCS: Performed by: HOSPITALIST

## 2023-03-14 PROCEDURE — 6370000000 HC RX 637 (ALT 250 FOR IP): Performed by: HOSPITALIST

## 2023-03-14 PROCEDURE — 94640 AIRWAY INHALATION TREATMENT: CPT

## 2023-03-14 PROCEDURE — 85027 COMPLETE CBC AUTOMATED: CPT

## 2023-03-14 PROCEDURE — 80048 BASIC METABOLIC PNL TOTAL CA: CPT

## 2023-03-14 PROCEDURE — 82962 GLUCOSE BLOOD TEST: CPT

## 2023-03-14 PROCEDURE — 6370000000 HC RX 637 (ALT 250 FOR IP): Performed by: FAMILY MEDICINE

## 2023-03-14 PROCEDURE — 2580000003 HC RX 258: Performed by: HOSPITALIST

## 2023-03-14 PROCEDURE — 36415 COLL VENOUS BLD VENIPUNCTURE: CPT

## 2023-03-14 PROCEDURE — 1100000000 HC RM PRIVATE

## 2023-03-14 RX ORDER — MECOBALAMIN 5000 MCG
5 TABLET,DISINTEGRATING ORAL NIGHTLY
Status: DISCONTINUED | OUTPATIENT
Start: 2023-03-14 | End: 2023-03-17 | Stop reason: HOSPADM

## 2023-03-14 RX ORDER — BUSPIRONE HYDROCHLORIDE 5 MG/1
7.5 TABLET ORAL 2 TIMES DAILY
COMMUNITY

## 2023-03-14 RX ORDER — DONEPEZIL HYDROCHLORIDE 10 MG/1
10 TABLET, FILM COATED ORAL NIGHTLY
COMMUNITY

## 2023-03-14 RX ORDER — TRAZODONE HYDROCHLORIDE 50 MG/1
50 TABLET ORAL NIGHTLY
COMMUNITY

## 2023-03-14 RX ADMIN — AMLODIPINE BESYLATE 10 MG: 5 TABLET ORAL at 09:19

## 2023-03-14 RX ADMIN — METHYLPREDNISOLONE SODIUM SUCCINATE 40 MG: 40 INJECTION, POWDER, FOR SOLUTION INTRAMUSCULAR; INTRAVENOUS at 00:56

## 2023-03-14 RX ADMIN — APIXABAN 5 MG: 5 TABLET, FILM COATED ORAL at 09:19

## 2023-03-14 RX ADMIN — METHYLPREDNISOLONE SODIUM SUCCINATE 40 MG: 40 INJECTION, POWDER, FOR SOLUTION INTRAMUSCULAR; INTRAVENOUS at 13:15

## 2023-03-14 RX ADMIN — TAMSULOSIN HYDROCHLORIDE 0.4 MG: 0.4 CAPSULE ORAL at 17:30

## 2023-03-14 RX ADMIN — CEFTRIAXONE 1000 MG: 1 INJECTION, POWDER, FOR SOLUTION INTRAMUSCULAR; INTRAVENOUS at 22:23

## 2023-03-14 RX ADMIN — Medication 5 MG: at 22:23

## 2023-03-14 RX ADMIN — PANTOPRAZOLE SODIUM 40 MG: 40 TABLET, DELAYED RELEASE ORAL at 09:19

## 2023-03-14 RX ADMIN — FERROUS SULFATE TAB 325 MG (65 MG ELEMENTAL FE) 325 MG: 325 (65 FE) TAB at 17:30

## 2023-03-14 RX ADMIN — AZITHROMYCIN MONOHYDRATE 500 MG: 500 INJECTION, POWDER, LYOPHILIZED, FOR SOLUTION INTRAVENOUS at 15:54

## 2023-03-14 RX ADMIN — APIXABAN 5 MG: 5 TABLET, FILM COATED ORAL at 22:23

## 2023-03-14 RX ADMIN — ARFORMOTEROL TARTRATE 15 MCG: 15 SOLUTION RESPIRATORY (INHALATION) at 07:58

## 2023-03-14 RX ADMIN — BUDESONIDE 250 MCG: 0.25 INHALANT RESPIRATORY (INHALATION) at 07:58

## 2023-03-14 RX ADMIN — IPRATROPIUM BROMIDE AND ALBUTEROL SULFATE 3 ML: .5; 3 SOLUTION RESPIRATORY (INHALATION) at 18:17

## 2023-03-14 RX ADMIN — PANTOPRAZOLE SODIUM 40 MG: 40 TABLET, DELAYED RELEASE ORAL at 15:55

## 2023-03-14 RX ADMIN — FERROUS SULFATE TAB 325 MG (65 MG ELEMENTAL FE) 325 MG: 325 (65 FE) TAB at 09:19

## 2023-03-14 RX ADMIN — IPRATROPIUM BROMIDE AND ALBUTEROL SULFATE 3 ML: .5; 3 SOLUTION RESPIRATORY (INHALATION) at 12:53

## 2023-03-14 RX ADMIN — IPRATROPIUM BROMIDE AND ALBUTEROL SULFATE 3 ML: .5; 3 SOLUTION RESPIRATORY (INHALATION) at 07:57

## 2023-03-14 RX ADMIN — FUROSEMIDE 20 MG: 20 TABLET ORAL at 09:19

## 2023-03-14 NOTE — PROGRESS NOTES
Hospitalist Progress Note    Patient: John Vines MRN: [id number]  CSN: [id number]    YOB: 1945  Age: 76 y.o. Sex: male    DOA: 3/13/2023 LOS:  LOS: 1 day                Assessment/Plan     Active Hospital Problems    Diagnosis     COPD (chronic obstructive pulmonary disease) ([facility] [J44.9]      Priority: Medium    Bilateral deafness [H91.93]     Acute exacerbation of chronic obstructive pulmonary disease (COPD) ([facility] [J44.1]     Hypertension [I10]     PAF (paroxysmal atrial fibrillation) ([facility]) [I48.0]     Acute on chronic respiratory failure with hypoxemia ([facility]) [J96.21]     Chronic renal disease, stage 3, moderately decreased glomerular filtration rate between 30-59 mL/min/1.73 square meter ([facility] [N18.30]         Chief complaint :  SOB  76 y.o. male with chronic respiratory failure, COPD, PAF, HTN, presents to ER with concerns of SOB. Acute on chronic respiratory failure-    2nd to COPD exac. On [place] O2. COPD with acute exacerbation-    on Brovana and Pulmicort nebulizers   Solumedrol   Neb treatment as needed. Paroxysmal atrial fib  -    rate controlled. on Eliquis. Chronic kidney disease -    Avoid nephrotoxic agents trend BMP        Hard of hearing With early dementia -    high risks for fall. Called and discussed with wife. She wants patient to go to NH    Disposition : 1-2    Review of systems  General: No fevers or chills. Cardiovascular: No chest pain or pressure. No palpitations. Pulmonary: No shortness of breath. Gastrointestinal: No nausea, vomiting. Physical Exam:  General: Awake, cooperative, no acute distress    HEENT: NC, Atraumatic. PERRLA, anicteric sclerae. Lungs: CTA Bilaterally. No Wheezing/Rhonchi/Rales. Heart:  S1 S2,  No murmur, No Rubs, No Gallops  Abdomen: Soft, Non distended, Non tender. +Bowel sounds,   Extremities: No c/c/e  Psych:   Not anxious or agitated. Neurologic:  No acute neurological deficit.          Vital signs/Intake and Output:  Visit Vitals  BP (!) 115/57   Pulse 79   Temp 98.3 °F (36.8 °C) (Oral)   Resp 22   Ht 5' 9\" (1.753 m)   Wt 162 lb (73.5 kg)   SpO2 100%   BMI 23.92 kg/m²     Current Shift:  No intake/output data recorded. Last three shifts:  03/13 0701 - 03/14 1900  In: -   Out: 770 [Urine:770]            Labs: Results:       Chemistry Recent Labs     03/13/23  1250 03/14/23  0625    140   K 4.6 4.4    107   CO2 28 25   BUN 22* 26*   ,  Lab Results   Component Value Date/Time    LACTA 0.9 02/06/2023 03:03 AM      CBC w/Diff Recent Labs     03/13/23  1250 03/14/23  0625   WBC 8.9 5.6   RBC 4.31* 3.94*   HGB 11.9* 11.0*   HCT 37.9 33.6*    306      Cardiac Enzymes Lab Results   Component Value Date    CKTOTAL 28 (L) 05/02/2022    CKMB 1.5 09/30/2021    CKMBINDEX 6.0 (H) 09/30/2021    TROPONINI <0.02 09/30/2021   ,  Lab Results   Component Value Date     01/25/2023      Coagulation No results for input(s): INR, APTT in the last 72 hours. Invalid input(s): PTP    Lipid Panel Lab Results   Component Value Date/Time    CHOL 172 04/02/2022 03:23 AM    HDL 39 04/02/2022 03:23 AM      Pancreas No results for input(s): LIPASE in the last 72 hours. ,No results for input(s): AMYLASE in the last 72 hours. Liver Enzymes No results for input(s): TP, ALB in the last 72 hours. Invalid input(s): TBIL, AP, SGOT, GPT, DBIL   Thyroid Studies Lab Results   Component Value Date/Time    TSH 3.06 04/05/2021 01:30 PM        Procedures/imaging: see electronic medical records for all procedures/Xrays and details which were not copied into this note but were reviewed prior to creation of Plan    TIME: E/M Time spent with patient and patient care issues: [] 15-20 min  [] 21-30 min  [x] 31-44 min  [] 45 min or more.      This time also includes physician non-face-to-face service time visit on the date of service such as  Preparing to see the patient (eg, review of tests)  Obtaining and/or reviewing separately obtained history  Performing a medically necessary appropriate examination and/or evaluation  Counseling and educating the patient/family/caregiver  Ordering medications, tests, or procedures  Referring and communicating with other health care professionals as needed  Documenting clinical information in the electronic or other health record  Independently interpreting results (not reported separately) and communicating results to the patient/family/caregiver  Care coordination and discharge planning with Case Management.

## 2023-03-14 NOTE — PROGRESS NOTES
Physician Progress Note      All Ness  CSN #:                  [id number]  :                       1945  ADMIT DATE:       3/13/2023 12:49 PM  [address] [place] DATE:  Tilda Face  PROVIDER #:        Ameena Pitts MD          QUERY TEXT:    Patient admitted with copd exacerbation, noted to have atrial fibrillation and   is maintained on Eliquis. If possible, please document in progress notes and   discharge summary if you are evaluating and/or treating any of the following: The medical record reflects the following:  Risk Factors: Afib  Clinical Indicators: 76 yr male with CKD, HTN  Treatment: Eliquis    Thank You  Telma Rincon RN, CDI, CRCR  Options provided:  -- Secondary hypercoagulable state in a patient with atrial fibrillation  -- Other - I will add my own diagnosis  -- Disagree - Not applicable / Not valid  -- Disagree - Clinically unable to determine / Unknown  -- Refer to Clinical Documentation Reviewer    PROVIDER RESPONSE TEXT:    This patient has secondary hypercoagulable state in a patient with atrial   fibrillation. Query created by: Lawrence Baez on 3/14/2023 12:15 PM      Electronically signed by:   Ameena Pitts MD 3/14/2023 7:41 PM

## 2023-03-14 NOTE — PLAN OF CARE
2000 Assumed pt care. Pt very hard of hearing, but oriented x3, forgets year. Skin is very dry, hyperpigmentation BLE. Edema prominent on all extremities, +1 BUE and +3 BLE. Thick and discolored nails, JEAN PIERRE cap refill, sensation intact. Expiratory wheezing present in L lobe, expiratory grunting on R lobe and diminished in bases, pt already on 2L NC. Bed in lowest position, callbell within reach. Bed alarm defective, door open. Will continue plan of care. 2300 Responded to call light, pt voided in urinal and c/o worsening SOB. Paged RT regarding 2000 doses of neb treatments not given. Luis Armando Fung, RT said she was unaware of order and will be up shortly. Will continue plan of care. 0310 Pt sleeping, HOB in high fowlers. Unlabored breathing, no signs of wheezing, but pt still having expiratory grunting.

## 2023-03-14 NOTE — PROGRESS NOTES
Case Management Assessment  Initial Evaluation    Date/Time of Evaluation: 3/14/2023 4:51 PM  Assessment Completed by: Pooja Durbin    If patient is discharged prior to next notation, then this note serves as note for discharge by case management. Patient Name: Donavon Ge                   YOB: 1945  Diagnosis: COPD (chronic obstructive pulmonary disease) ([facility] [J44.9]  COPD exacerbation [facility] [J44.1]                   Date / Time: 3/13/2023 12:49 PM    Patient Admission Status: Inpatient   Readmission Risk (Low < 19, Mod (19-27), High > 27): Readmission Risk Score: 18.2    Current PCP: Davis Degroot MD  PCP verified by CM? Yes    Chart Reviewed: Yes      History Provided by: Significant Other  Patient Orientation: Unable to Assess    Patient Cognition:      Hospitalization in the last 30 days (Readmission):  No    If yes, Readmission Assessment in  Navigator will be completed. Advance Directives:      Code Status: DNR   Patient's Primary Decision Maker is:      Primary Decision MakerKeyur Tomlinson Spouse - [phone]    Secondary Decision Maker: Cy Shantell Child - [phone]    Discharge Planning:    Patient lives with: Spouse/Significant Other Type of Home: Skilled Nursing Facility  Primary Care Giver: Spouse  Patient Support Systems include: Spouse/Significant Other, Children   Current Financial resources: None  Current community resources:    Current services prior to admission: None            Current DME:              Type of Home Care services:  None    ADLS  Prior functional level: Assistance with the following:, Bathing, Toileting, Cooking, Housework, Shopping, Mobility  Current functional level: Mobility, Shopping, Housework, Cooking, Toileting, Dressing, Bathing, Assistance with the following:    PT AM-PAC:   /24  OT AM-PAC:   /24    Family can provide assistance at DC:     Would you like Case Management to discuss the discharge plan with any other family members/significant others, and if so, who? Yes  Plans to Return to Present Housing: No  Other Identified Issues/Barriers to RETURNING to current housing: clinical progression  Potential Assistance needed at discharge: Justin Womack            Potential DME:    Patient expects to discharge to: [address] for transportation at discharge:      Financial    Payor: [address] [place] [address] / Plan: MEDICARE PART A / Product Type: *No Product type* /     Does insurance require precert for SNF: No    Potential assistance Purchasing Medications: No  Meds-to-Beds request: Yes      Daniela Quintanilla [address] [place], [place] - [address] [facility] [phone] - F [phone]  [address] [facility] [address]  Phone: [phone] Fax: [phone]      Notes:    Factors facilitating achievement of predicted outcomes: Family support    Barriers to discharge: Limited family support    Additional Case Management Notes: SNF    CM and provider met with pt at bedside. Pt is extremely [place] and was receiving a breathing treatment. CM contacted pt's wife, Holley Miguel ([phone]), to discuss care transition. Family would like SNF placement. CM discussed a LTSS/UAI and family is in agreement with having this completed as they have already applied for Medicaid. CM offered FOC and family would like to have clinical information sent to facilities in . CMS has been notified to assist. Anticipate pt will transition to SNF once medically stable and an accepting facility has been identifeid. CM to cotninue to follow and assist.    The Plan for Transition of Care is related to the following treatment goals of COPD (chronic obstructive pulmonary disease) ([facility] [J44.9]  COPD exacerbation ([facility] [Q12.5]    IF APPLICABLE: The Patient and/or patient representative Dorcas Quintanilla and his family were provided with a choice of provider and agrees with the discharge plan.  Freedom of choice list with basic dialogue that supports the patient's individualized plan of care/goals and shares the quality data associated with the providers was provided to: Patient Representative   Patient Representative Name: spouse     The Patient and/or Patient Representative Agree with the Discharge Plan?  Yes    Askbeth Christopher  Case Management Department

## 2023-03-14 NOTE — PROGRESS NOTES
Bedside shift change report given to [facility] (oncoming nurse) by Andrey Shoemaker RN (offgoing nurse). Report included the following information Nurse Handoff Report, Adult Overview, Intake/Output, MAR, and Recent Results.

## 2023-03-14 NOTE — H&P
History & Physical    Patient: Marija Mac MRN: [id number]  CSN: [id number]    YOB: 1945  Age: 76 y.o. Sex: male      DOA: 3/13/2023  Primary Care Provider:  Carmen Knight MD      Assessment/Plan     Active Hospital Problems    Diagnosis     COPD (chronic obstructive pulmonary disease) ([facility] [J44.9]      Priority: Medium    Bilateral deafness [H91.93]     Acute exacerbation of chronic obstructive pulmonary disease (COPD) ([facility]) [J44.1]     Hypertension [I10]     PAF (paroxysmal atrial fibrillation) ([facility]) [I48.0]     Acute on chronic respiratory failure with hypoxemia ([facility]) [J96.21]     Chronic renal disease, stage 3, moderately decreased glomerular filtration rate between 30-59 mL/min/1.73 square meter ([facility] [N18.30]      Admit to medical floor    Acute on chronic respiratory failure-    2nd to COPD exac. On NC O2. COPD with acute exacerbation-    on Brovana and Pulmicort nebulizers   Solumedrol   Neb treatment as needed         Paroxysmal atrial fib  -    rate controlled. on Eliquis. Chronic kidney disease -    Avoid nephrotoxic agents trend BMP        Hard of hearing With early dementia -    high risks for fall       PT and OT consult    DNR    Estimated length of stay : 2-3 days    CC: SOB       HPI:     Marija Mac is a 76 y.o. male with chronic respiratory failure, COPD, PAF, HTN, presents to ER with concerns of SOB since yesterday. He is very hard of hearing. It has been getting progressively worse. He has been using neb treatment  with no help. associated with productive cough, He denies any fever/chills, chest pain, N/V.  EMS was called and found his oxygen saturation 82%, he received breathing treatment with improvement in symptoms. In ER he received solumedrol and neb treatment and he was still wheezing. CXR with stable right lung infiltrate and volume loss, stable left basilar interstitial prominence.     Past Medical History:   Diagnosis Date    BMI 30.0-30.9,adult 4/2/2022    Brain aneurysm     Brain aneurysm     Cancer [facility])     prostate    CHF (congestive heart failure) (H[facility])     CKD (chronic kidney disease)     Closed nondisplaced supracondylar fracture of lower end of left femur without intracondylar extension with delayed healing     COPD (chronic obstructive pulmonary disease) ([facility]     Debility     History of home oxygen therapy     [place] (hard of hearing)     Hypertension     Nocturia     On home oxygen therapy     PAF (paroxysmal atrial fibrillation) ([facility])     Pneumonia     Prostate CA ([facility]     Prostate neoplasm     Radiation effect        Past Surgical History:   Procedure Laterality Date    HERNIA REPAIR      HIP ARTHROSCOPY  04/09/2021       Family History   Problem Relation Age of Onset    Heart Disease Mother        Social History     Socioeconomic History    Marital status:    Tobacco Use    Smoking status: Former    Smokeless tobacco: Never   Substance and Sexual Activity    Alcohol use: No    Drug use: Never       Prior to Admission medications    Medication Sig Start Date End Date Taking?  Authorizing Provider   DAPTOMYCIN IV Infuse 450 mg intravenously daily    Ar Automatic Reconciliation   albuterol sulfate HFA (PROVENTIL;VENTOLIN;PROAIR) 108 (90 Base) MCG/ACT inhaler Inhale 2 puffs into the lungs every 4 hours as needed 5/13/22   Ar Automatic Reconciliation   albuterol (PROVENTIL) (2.5 MG/3ML) 0.083% nebulizer solution Inhale 2.5 mg into the lungs every 4 hours as needed 11/28/22   Ar Automatic Reconciliation   amLODIPine (NORVASC) 10 MG tablet Take 10 mg by mouth daily 4/16/22   Ar Automatic Reconciliation   apixaban (ELIQUIS) 5 MG TABS tablet Take 5 mg by mouth 2 times daily 4/15/22   Ar Automatic Reconciliation   ferrous sulfate (IRON 325) 325 (65 Fe) MG tablet Take 325 mg by mouth 2 times daily (with meals) 4/15/22   Ar Automatic Reconciliation   furosemide (LASIX) 20 MG tablet Take 20 mg by mouth daily 4/15/22   Ar Automatic Reconciliation ipratropium-albuterol (DUONEB) 0.5-2.5 (3) MG/3ML SOLN nebulizer solution Inhale 3 mLs into the lungs every 4 hours as needed 10/14/22   Ar Automatic Reconciliation   pantoprazole (PROTONIX) 40 MG tablet Take 40 mg by mouth 2 times daily (before meals) 4/15/22   Ar Automatic Reconciliation   tamsulosin (FLOMAX) 0.4 MG capsule Take 0.4 mg by mouth every evening 4/15/22   Ar Automatic Reconciliation         Allergies   Allergen Reactions    Aspirin Other (See Comments)     \"messes my stomach up\"    Nsaids      Other reaction(s): Unknown (comments)    Sulfamethoxazole-Trimethoprim      Other reaction(s): Unknown (comments)       Review of Systems  Gen: No fever, chills, malaise, weight loss/gain. Heent: No headache, rhinorrhea, epistaxis, ear pain, sinus pain, neck pain/stiffness, sore throat. Heart: No chest pain, palpitations, LOPEZ, pnd, or orthopnea. Resp: see above. GI: No nausea, vomiting, diarrhea, constipation, melena or hematochezia. : No urinary obstruction, dysuria or hematuria. Derm: No rash, new skin lesion or pruritis. Musc/skeletal: no bone or joint complains. Vasc: No edema, cyanosis or claudication. Endo: No heat/cold intolerance, no polyuria,polydipsia or polyphagia. Neuro: No unilateral weakness, numbness, tingling. No seizures. Heme: No easy bruising or bleeding. Physical Exam:     Physical Exam:  BP (!) 148/81   Pulse 96   Temp 98.8 °F (37.1 °C) (Oral)   Resp 20   Ht 5' 9\" (1.753 m)   Wt 162 lb (73.5 kg)   SpO2 98%   BMI 23.92 kg/m²         Temp (24hrs), Av.1 °F (36.7 °C), Min:97.5 °F (36.4 °C), Max:98.8 °F (37.1 °C)    701 -  1900  In: -   Out: 350 [Urine:350]   1901 -  0700  In: -   Out: 220 [Urine:220]      General:  Awake, cooperative, no distress. Head:  Normocephalic, without obvious abnormality, atraumatic. Eyes:  Conjunctivae/corneas clear, sclera anicteric, PERRL, EOMs intact.    Nose: Nares normal. No drainage or sinus tenderness. Throat: Lips, mucosa, and tongue normal.    Neck: Supple, symmetrical, trachea midline, no adenopathy. Lungs:   Occasional wheezes bilaterally. Heart:  Regular rate and rhythm, S1, S2 normal, no murmur, click, rub or gallop. Abdomen: Soft, non-tender. Bowel sounds normal. No masses,  No organomegaly. Extremities: Extremities normal, atraumatic, no cyanosis or edema. Capillary refill normal.   Pulses: 2+ and symmetric all extremities. Skin: Skin color pink/pale/mottled/flushed, turgor normal. No rashes or lesions   Neurologic: CNII-XII intact. No focal motor or sensory deficit. Labs Reviewed:  Labs: Results:       Chemistry Recent Labs     03/13/23  1250 03/14/23  0625    140   K 4.6 4.4    107   CO2 28 25   BUN 22* 26*   ,  Lab Results   Component Value Date/Time    LACTA 0.9 02/06/2023 03:03 AM      CBC w/Diff Recent Labs     03/13/23  1250 03/14/23  0625   WBC 8.9 5.6   RBC 4.31* 3.94*   HGB 11.9* 11.0*   HCT 37.9 33.6*    306      Cardiac Enzymes Lab Results   Component Value Date    CKTOTAL 28 (L) 05/02/2022    CKMB 1.5 09/30/2021    CKMBINDEX 6.0 (H) 09/30/2021    TROPONINI <0.02 09/30/2021   ,  Lab Results   Component Value Date     01/25/2023      Coagulation No results for input(s): INR, APTT in the last 72 hours. Invalid input(s): PTP    Lipid Panel Lab Results   Component Value Date/Time    CHOL 172 04/02/2022 03:23 AM    HDL 39 04/02/2022 03:23 AM      Pancreas No results for input(s): LIPASE in the last 72 hours. ,No results for input(s): AMYLASE in the last 72 hours. Liver Enzymes No results for input(s): TP, ALB in the last 72 hours.     Invalid input(s): TBIL, AP, SGOT, GPT, DBIL   Thyroid Studies Lab Results   Component Value Date/Time    TSH 3.06 04/05/2021 01:30 PM            Procedures/imaging: see electronic medical records for all procedures/Xrays and details which were not copied into this note but were reviewed prior to creation of Plan. TIME: E/M Time spent with patient and patient care issues: [] 15-20 min  [] 21-30 min  [x] 31-44 min  [] 45 min or more.      This time also includes physician non-face-to-face service time visit on the date of service such as  Preparing to see the patient (eg, review of tests)  Obtaining and/or reviewing separately obtained history  Performing a medically necessary appropriate examination and/or evaluation  Counseling and educating the patient/family/caregiver  Ordering medications, tests, or procedures  Referring and communicating with other health care professionals as needed  Documenting clinical information in the electronic or other health record  Independently interpreting results (not reported separately) and communicating results to the patient/family/caregiver  Care coordination (not reported separately)

## 2023-03-14 NOTE — PROGRESS NOTES
7269 Patient received breathing treatment and was assess by respiratory therapist. Patient on   Patient with O2 Sat 100% at 2L of O2 NC and slight expiratory wheezing. 200 dr Azael Cuevas paged,   [phone] Dr teleu Marda Oppenheim paged  [phone] Dr Azael Cuevas stated she will come and  assess patient.

## 2023-03-14 NOTE — PLAN OF CARE
Problem: Pain  Goal: Verbalizes/displays adequate comfort level or baseline comfort level  3/13/2023 2117 by Dion Trent RN  Outcome: Progressing     Problem: Safety - Adult  Goal: Free from fall injury  3/13/2023 2117 by Dion Trent RN  Outcome: Progressing  Flowsheets (Taken 3/13/2023 2000)  Free From Fall Injury:   Instruct family/caregiver on patient safety   Based on caregiver fall risk screen, instruct family/caregiver to ask for assistance with transferring infant if caregiver noted to have fall risk factors     Problem: Skin/Tissue Integrity  Goal: Absence of new skin breakdown  Description: 1. Monitor for areas of redness and/or skin breakdown  2. Assess vascular access sites hourly  3. Every 4-6 hours minimum:  Change oxygen saturation probe site  4. Every 4-6 hours:  If on nasal continuous positive airway pressure, respiratory therapy assess nares and determine need for appliance change or resting period.   Outcome: Progressing     Problem: ABCDS Injury Assessment  Goal: Absence of physical injury  Outcome: Progressing

## 2023-03-15 LAB
ANION GAP SERPL CALC-SCNC: 7 MMOL/L (ref 3–18)
BUN SERPL-MCNC: 39 MG/DL (ref 7–18)
BUN/CREAT SERPL: 25 (ref 12–20)
CALCIUM SERPL-MCNC: 7.5 MG/DL (ref 8.5–10.1)
CHLORIDE SERPL-SCNC: 106 MMOL/L (ref 100–111)
CO2 SERPL-SCNC: 25 MMOL/L (ref 21–32)
CREAT SERPL-MCNC: 1.53 MG/DL (ref 0.6–1.3)
ERYTHROCYTE [DISTWIDTH] IN BLOOD BY AUTOMATED COUNT: 14.2 % (ref 11.6–14.5)
GLUCOSE SERPL-MCNC: 170 MG/DL (ref 74–99)
HCT VFR BLD AUTO: 27.3 % (ref 36–48)
HGB BLD-MCNC: 9 G/DL (ref 13–16)
MCH RBC QN AUTO: 27.9 PG (ref 24–34)
MCHC RBC AUTO-ENTMCNC: 33 G/DL (ref 31–37)
MCV RBC AUTO: 84.5 FL (ref 78–100)
NRBC # BLD: 0 K/UL (ref 0–0.01)
NRBC BLD-RTO: 0 PER 100 WBC
PLATELET # BLD AUTO: 266 K/UL (ref 135–420)
PMV BLD AUTO: 9.3 FL (ref 9.2–11.8)
POTASSIUM SERPL-SCNC: 4 MMOL/L (ref 3.5–5.5)
RBC # BLD AUTO: 3.23 M/UL (ref 4.35–5.65)
SODIUM SERPL-SCNC: 138 MMOL/L (ref 136–145)
WBC # BLD AUTO: 8.5 K/UL (ref 4.6–13.2)

## 2023-03-15 PROCEDURE — 94660 CPAP INITIATION&MGMT: CPT

## 2023-03-15 PROCEDURE — 97116 GAIT TRAINING THERAPY: CPT

## 2023-03-15 PROCEDURE — 6370000000 HC RX 637 (ALT 250 FOR IP): Performed by: FAMILY MEDICINE

## 2023-03-15 PROCEDURE — 94640 AIRWAY INHALATION TREATMENT: CPT

## 2023-03-15 PROCEDURE — 36415 COLL VENOUS BLD VENIPUNCTURE: CPT

## 2023-03-15 PROCEDURE — 80048 BASIC METABOLIC PNL TOTAL CA: CPT

## 2023-03-15 PROCEDURE — 85027 COMPLETE CBC AUTOMATED: CPT

## 2023-03-15 PROCEDURE — 6360000002 HC RX W HCPCS: Performed by: HOSPITALIST

## 2023-03-15 PROCEDURE — 5A09357 ASSISTANCE WITH RESPIRATORY VENTILATION, LESS THAN 24 CONSECUTIVE HOURS, CONTINUOUS POSITIVE AIRWAY PRESSURE: ICD-10-PCS | Performed by: HOSPITALIST

## 2023-03-15 PROCEDURE — 2700000000 HC OXYGEN THERAPY PER DAY

## 2023-03-15 PROCEDURE — 2580000003 HC RX 258: Performed by: HOSPITALIST

## 2023-03-15 PROCEDURE — 97165 OT EVAL LOW COMPLEX 30 MIN: CPT

## 2023-03-15 PROCEDURE — 6370000000 HC RX 637 (ALT 250 FOR IP): Performed by: HOSPITALIST

## 2023-03-15 PROCEDURE — 1100000000 HC RM PRIVATE

## 2023-03-15 PROCEDURE — 97161 PT EVAL LOW COMPLEX 20 MIN: CPT

## 2023-03-15 RX ORDER — QUETIAPINE FUMARATE 25 MG/1
25 TABLET, FILM COATED ORAL NIGHTLY
Status: DISCONTINUED | OUTPATIENT
Start: 2023-03-15 | End: 2023-03-17 | Stop reason: HOSPADM

## 2023-03-15 RX ADMIN — Medication 5 MG: at 21:24

## 2023-03-15 RX ADMIN — AZITHROMYCIN MONOHYDRATE 500 MG: 500 INJECTION, POWDER, LYOPHILIZED, FOR SOLUTION INTRAVENOUS at 15:19

## 2023-03-15 RX ADMIN — TAMSULOSIN HYDROCHLORIDE 0.4 MG: 0.4 CAPSULE ORAL at 17:15

## 2023-03-15 RX ADMIN — APIXABAN 5 MG: 5 TABLET, FILM COATED ORAL at 08:54

## 2023-03-15 RX ADMIN — PANTOPRAZOLE SODIUM 40 MG: 40 TABLET, DELAYED RELEASE ORAL at 08:02

## 2023-03-15 RX ADMIN — METHYLPREDNISOLONE SODIUM SUCCINATE 40 MG: 40 INJECTION, POWDER, FOR SOLUTION INTRAMUSCULAR; INTRAVENOUS at 14:19

## 2023-03-15 RX ADMIN — BUDESONIDE 250 MCG: 0.25 INHALANT RESPIRATORY (INHALATION) at 08:08

## 2023-03-15 RX ADMIN — FERROUS SULFATE TAB 325 MG (65 MG ELEMENTAL FE) 325 MG: 325 (65 FE) TAB at 08:54

## 2023-03-15 RX ADMIN — METHYLPREDNISOLONE SODIUM SUCCINATE 40 MG: 40 INJECTION, POWDER, FOR SOLUTION INTRAMUSCULAR; INTRAVENOUS at 01:18

## 2023-03-15 RX ADMIN — FUROSEMIDE 20 MG: 20 TABLET ORAL at 08:54

## 2023-03-15 RX ADMIN — ARFORMOTEROL TARTRATE 15 MCG: 15 SOLUTION RESPIRATORY (INHALATION) at 21:16

## 2023-03-15 RX ADMIN — QUETIAPINE FUMARATE 25 MG: 25 TABLET ORAL at 21:24

## 2023-03-15 RX ADMIN — AMLODIPINE BESYLATE 10 MG: 5 TABLET ORAL at 08:54

## 2023-03-15 RX ADMIN — BUDESONIDE 250 MCG: 0.25 INHALANT RESPIRATORY (INHALATION) at 21:16

## 2023-03-15 RX ADMIN — CEFTRIAXONE 1000 MG: 1 INJECTION, POWDER, FOR SOLUTION INTRAMUSCULAR; INTRAVENOUS at 21:24

## 2023-03-15 RX ADMIN — FERROUS SULFATE TAB 325 MG (65 MG ELEMENTAL FE) 325 MG: 325 (65 FE) TAB at 17:15

## 2023-03-15 RX ADMIN — ARFORMOTEROL TARTRATE 15 MCG: 15 SOLUTION RESPIRATORY (INHALATION) at 08:08

## 2023-03-15 RX ADMIN — IPRATROPIUM BROMIDE AND ALBUTEROL SULFATE 3 ML: .5; 3 SOLUTION RESPIRATORY (INHALATION) at 00:06

## 2023-03-15 RX ADMIN — APIXABAN 5 MG: 5 TABLET, FILM COATED ORAL at 21:24

## 2023-03-15 RX ADMIN — PANTOPRAZOLE SODIUM 40 MG: 40 TABLET, DELAYED RELEASE ORAL at 15:19

## 2023-03-15 RX ADMIN — QUETIAPINE FUMARATE 25 MG: 25 TABLET ORAL at 01:17

## 2023-03-15 ASSESSMENT — PAIN SCALES - GENERAL: PAINLEVEL_OUTOF10: 0

## 2023-03-15 NOTE — PROGRESS NOTES
Hospitalist Progress Note    Patient: Ajit Mccallum MRN: [id number]  CSN: [id number]    YOB: 1945  Age: 76 y.o. Sex: male    DOA: 3/13/2023 LOS:  LOS: 2 days                Assessment/Plan     Active Hospital Problems    Diagnosis     COPD (chronic obstructive pulmonary disease) ([facility] [J44.9]      Priority: Medium    Bilateral deafness [H91.93]     Acute exacerbation of chronic obstructive pulmonary disease (COPD) ([facility] [J44.1]     Hypertension [I10]     PAF (paroxysmal atrial fibrillation) ([facility]) [I48.0]     Acute on chronic respiratory failure with hypoxemia ([facility]) [J96.21]     Chronic renal disease, stage 3, moderately decreased glomerular filtration rate between 30-59 mL/min/1.73 square meter ([facility] [N18.30]         Chief complaint :  SOB  76 y.o. male with chronic respiratory failure, COPD, PAF, HTN, presents to ER with concerns of SOB. Feeling better    Acute on chronic respiratory failure-    2nd to COPD exac. On NC O2. COPD with acute exacerbation-    on Brovana and Pulmicort nebulizers   Solumedrol   Neb treatment as needed. Paroxysmal atrial fib  -    rate controlled. on Eliquis. Chronic kidney disease -    Avoid nephrotoxic agents trend BMP        Hard of hearing With early dementia -    high risks for fall. Called and discussed with wife. She wants patient to go to NH    Disposition : 1-2 days    Review of systems  General: No fevers or chills. Cardiovascular: No chest pain or pressure. No palpitations. Pulmonary: No shortness of breath. Gastrointestinal: No nausea, vomiting. Physical Exam:  General: Awake, cooperative, no acute distress    HEENT: NC, Atraumatic. PERRLA, anicteric sclerae. Lungs: CTA Bilaterally. No Wheezing/Rhonchi/Rales. Heart:  S1 S2,  No murmur, No Rubs, No Gallops  Abdomen: Soft, Non distended, Non tender. +Bowel sounds,   Extremities: No c/c/e  Psych:   Not anxious or agitated. Neurologic:  No acute neurological deficit. Vital signs/Intake and Output:  Visit Vitals  BP (!) 105/56   Pulse 78   Temp 97.8 °F (36.6 °C) (Oral)   Resp 18   Ht 5' 9\" (1.753 m)   Wt 149 lb 14.6 oz (68 kg)   SpO2 100%   BMI 22.14 kg/m²     Current Shift:  03/15 0701 - 03/15 1900  In: -   Out: 1200 [Urine:1200]  Last three shifts:  03/13 1901 - 03/15 0700  In: -   Out: 770 [Urine:770]            Labs: Results:       Chemistry Recent Labs     03/13/23  1250 03/14/23  0625 03/15/23  0336    140 138   K 4.6 4.4 4.0    107 106   CO2 28 25 25   BUN 22* 26* 39*   ,  Lab Results   Component Value Date/Time    LACTA 0.9 02/06/2023 03:03 AM      CBC w/Diff Recent Labs     03/13/23  1250 03/14/23  0625 03/15/23  0336   WBC 8.9 5.6 8.5   RBC 4.31* 3.94* 3.23*   HGB 11.9* 11.0* 9.0*   HCT 37.9 33.6* 27.3*    306 266      Cardiac Enzymes Lab Results   Component Value Date    CKTOTAL 28 (L) 05/02/2022    CKMB 1.5 09/30/2021    CKMBINDEX 6.0 (H) 09/30/2021    TROPONINI <0.02 09/30/2021   ,  Lab Results   Component Value Date     01/25/2023      Coagulation No results for input(s): INR, APTT in the last 72 hours. Invalid input(s): PTP    Lipid Panel Lab Results   Component Value Date/Time    CHOL 172 04/02/2022 03:23 AM    HDL 39 04/02/2022 03:23 AM      Pancreas No results for input(s): LIPASE in the last 72 hours. ,No results for input(s): AMYLASE in the last 72 hours. Liver Enzymes No results for input(s): TP, ALB in the last 72 hours. Invalid input(s): TBIL, AP, SGOT, GPT, DBIL   Thyroid Studies Lab Results   Component Value Date/Time    TSH 3.06 04/05/2021 01:30 PM        Procedures/imaging: see electronic medical records for all procedures/Xrays and details which were not copied into this note but were reviewed prior to creation of Plan    TIME: E/M Time spent with patient and patient care issues: [] 15-20 min  [] 21-30 min  [x] 31-44 min  [] 45 min or more.      This time also includes physician non-face-to-face service time visit on the date of service such as  Preparing to see the patient (eg, review of tests)  Obtaining and/or reviewing separately obtained history  Performing a medically necessary appropriate examination and/or evaluation  Counseling and educating the patient/family/caregiver  Ordering medications, tests, or procedures  Referring and communicating with other health care professionals as needed  Documenting clinical information in the electronic or other health record  Independently interpreting results (not reported separately) and communicating results to the patient/family/caregiver  Care coordination and discharge planning with Case Management.

## 2023-03-15 NOTE — PROGRESS NOTES
OCCUPATIONAL THERAPY EVALUATION/DISCHARGE    Patient: Kirti Hoffmann (89 y.o. male)  Date: 3/15/2023  Primary Diagnosis: COPD (chronic obstructive pulmonary disease) ([facility]) [J44.9]  COPD exacerbation (HCC) [J44.1]       Precautions: Fall Risk  PLOF: pt mod I for ADLs/functional mobility    ASSESSMENT AND RECOMMENDATIONS:  Based on the objective data below, pt appears to be functioning at baseline. Pt found in bathroom with RW. Pt able to perform toileting with mod I, and ambulated back to EOB with mod I using RW. Pt reports feeling SOB, NC not donned. Pt sat back on EOB and donned NC, SpO2 96%. Pt demonstrates ability to complete deep breathing technique. Pt reports he is performing ADLs at his baseline, he just feels a little more SOB than usual. Provided opportunity for pt to voice questions on ADL performance when home, pt has no further concerns. Pt left seated EOB, call bell/phone within reach. Maximum therapeutic gains met at current level of care and patient will be discharged from occupational therapy at this time. Further Equipment Recommendations for Discharge: n/a    Discharge Recommendation: Discharge Recommendations: Home with Home health OT    AMPAC: At this time and based on an AM-PAC score of 23/24, no further OT is recommended upon discharge due to (i.e. patient at baseline functional statusetc). Recommend patient returns to prior setting with prior services. This AMPAC score should be considered in conjunction with interdisciplinary team recommendations to determine the most appropriate discharge setting. Patient's social support, diagnosis, medical stability, and prior level of function should also be taken into consideration.      SUBJECTIVE:   Patient stated \"I'm doing fine\"    OBJECTIVE DATA SUMMARY:     Past Medical History:   Diagnosis Date    BMI 30.0-30.9,adult 4/2/2022    Brain aneurysm     Brain aneurysm     Cancer [facility])     prostate    CHF (congestive heart failure) ([facility] CKD (chronic kidney disease)     Closed nondisplaced supracondylar fracture of lower end of left femur without intracondylar extension with delayed healing     COPD (chronic obstructive pulmonary disease) ([facility]     Debility     History of home oxygen therapy     [place] (hard of hearing)     Hypertension     Nocturia     On home oxygen therapy     PAF (paroxysmal atrial fibrillation) (HCC)     Pneumonia     Prostate CA ([facility]     Prostate neoplasm     Radiation effect      Past Surgical History:   Procedure Laterality Date    HERNIA REPAIR      HIP ARTHROSCOPY  04/09/2021     Barriers to Learning/Limitations: hearing  Compensate with: visual, verbal, tactile, kinesthetic cues/model    Home Situation:   Social/Functional History  Lives With: Spouse  Home Layout: One level  Home Access: Stairs to enter with rails  Entrance Stairs - Number of Steps: 6  Entrance Stairs - Rails: Both  Bathroom Shower/Tub: Walk-in shower, Shower chair with back  Home Equipment: derek Alcaraz [employer] Help From: Family  ADL Assistance: Needs assistance  Toileting: Independent  Homemaking Assistance: Needs assistance  Meal Prep: Unable to assess (comment)  Laundry: Unable to assess (comment)  Vacuuming: Unable to assess (comment)  Cleaning: Unable to assess (comment)  Gardening: Unable to assess (comment)  Yard Work: Unable to assess (comment)  Driving: Unable to assess (comment)  Shopping: Unable to assess (comment)  : Unable to assess (comment)  Other (Comment):  Unable to assess (comment)  Ambulation Assistance: Needs assistance  Transfer Assistance: Needs assistance  Active : No  Mode of Transportation: Car  Occupation: Retired  []  Right hand dominant   []  Left hand dominant    Cognitive/Behavioral Status:  WNL    Coordination: BUE  Coordination: Within functional limits      Balance:  Balance  Sitting: Intact  Standing: Intact    Strength: BUE  Strength: Generally decreased, functional     Tone & Sensation: BUE  Tone: Normal  Sensation: Intact    Range of Motion: BUE  AROM: Generally decreased, functional    Functional Mobility and Transfers for ADLs:  Bed Mobility:  Bed Mobility Training  Bed Mobility Training: Yes  Overall Level of Assistance: Modified independent  Supine to Sit: Modified independent  Sit to Supine: Modified independent  Scooting: Modified independent  Transfers:  Transfer Training  Transfer Training: Yes  Overall Level of Assistance: Modified independent  Sit to Stand: Modified independent;Supervision (supervision due to equipment)  Stand to Sit: Modified independent;Supervision (supervision due to equipment)    ADL Assessment:   Feeding: Independent  Grooming: Modified independent   UE Bathing: Modified independent   LE Bathing: Supervision  UE Dressing: Modified independent   LE Dressing: Supervision  Toileting: Modified independent     Pain:  Pain level pre-treatment: 0/10   Pain level post-treatment: 0/10   Pain Intervention(s): Rest, Ice, Repositioning   Response to intervention: Nurse notified    Activity Tolerance:   Activity Tolerance: Patient Tolerated treatment well  Please refer to the flowsheet for vital signs taken during this treatment. After treatment:   [] Patient left in no apparent distress sitting up in chair  [x] Patient left in no apparent distress in bed  [] Call bell left within reach  [] Nursing notified  [] Caregiver present  [] Bed alarm activated    COMMUNICATION/EDUCATION:   Patient Education  Education Given To: Patient  Education Provided: Energy Conservation; Fall Prevention Strategies;IADL Safety;Transfer Training  Education Method: Demonstration;Verbal  Barriers to Learning: Hearing  Education Outcome: Verbalized understanding    Thank you for this referral.  Suzanne Medrano OT  Minutes: 8    Eval Complexity: [facility] AM-PAC® Daily Activity Inpatient Short Form (6-Clicks)*    How much HELP from another person does the patient currently need    (If the patient hasn't done an activity recently, how much help from another person do you think he/she would need if he/she tried?)   Total (Total A or Dep)   A Lot  (Mod to Max A)   A Little (Sup or Min A)   None (Mod I to I)   Putting on and taking off regular lower body clothing? [] 1 [] 2 [] 3 [x] 4   2. Bathing (including washing, rinsing,      drying)? [] 1 [] 2 [x] 3 [] 4   3. Toileting, which includes using toilet, bedpan or urinal?   [] 1 [] 2 [] 3 [x] 4   4. Putting on and taking off regular upper body clothing? [] 1 [] 2 [] 3 [x] 4   5. Taking care of personal grooming such as brushing teeth? [] 1 [] 2 [] 3 [x] 4   6. Eating meals? [] 1 [] 2 [] 3 [x] 4     At this time and based on an AM-PAC score of 23/24, no further OT is recommended upon discharge due to (i.e. patient at baseline functional statusetc). Recommend patient returns to prior setting with prior services.

## 2023-03-15 NOTE — CARDIO/PULMONARY
Called to give a PRN Neb Tx to patient due to severe SOB. Neb Tx gave no relief, patient stating help me Cont. BIPAP ordered and attempted but not tolerated. NC increased from 2L to 4L  Physician notified.

## 2023-03-15 NOTE — PROGRESS NOTES
PHYSICAL THERAPY EVALUATION/DISCHARGE    Patient: Ajit Mccallum (72 y.o. male)  Date: 3/15/2023  Primary Diagnosis: COPD (chronic obstructive pulmonary disease) ([facility]) [J44.9]  COPD exacerbation (HCC) [J44.1]    Precautions: Fall Risk,   PLOF: mod I with RW    ASSESSMENT AND RECOMMENDATIONS:  Pt is seen on medical unit following admission for diagnosis above. Pt found seated EOB on PT arrival and in NAD. O2 sat at rest 100% on O2 at 3.5L nc. Pt is extremely [place] and written notes used for communication during evaluation. Pt demonstrates BUE and BLE ROM/motor performance grossly decr'd but functional.  Pt able to perform supine<>sit, STS with RW mod I/S due to equipment (IV, O2) and participate with GT/RW 38ft with Mod I/S due to equipment. Pt O2 sat post gait 100%, HR87. Pt left seated EOB as when PT arrived, in NAD with all needs in reach. As pt functioning at baseline at this time, no further skilled PT indicated at this level of care. Nurse [facility] notified of same. No Skilled PT: At baseline function    Patient does not require further skilled physical therapy intervention at this level of care. Further Equipment Recommendations for Discharge:  Discharge Recommendation: Discharge Recommendations: No therapy recommended at discharge    AMPA: 18/20    This [facility] score should be considered in conjunction with interdisciplinary team recommendations to determine the most appropriate discharge setting. Patient's social support, diagnosis, medical stability, and prior level of function should also be taken into consideration. SUBJECTIVE:   Patient stated I'm ready to go home.     OBJECTIVE DATA SUMMARY:     Past Medical History:   Diagnosis Date    BMI 30.0-30.9,adult 4/2/2022    Brain aneurysm     Brain aneurysm     Cancer [facility])     prostate    CHF (congestive heart failure) (HCC)     CKD (chronic kidney disease)     Closed nondisplaced supracondylar fracture of lower end of left femur without intracondylar extension with delayed healing     COPD (chronic obstructive pulmonary disease) ([facility]     Debility     History of home oxygen therapy     [place] (hard of hearing)     Hypertension     Nocturia     On home oxygen therapy     PAF (paroxysmal atrial fibrillation) (HCC)     Pneumonia     Prostate CA ([facility]     Prostate neoplasm     Radiation effect      Past Surgical History:   Procedure Laterality Date    HERNIA REPAIR      HIP ARTHROSCOPY  04/09/2021     Barriers to Learning/Limitations: Hearing Impaired  Compensate with: visual, verbal, tactile, kinesthetic cues/model  Home Situation:  Social/Functional History  Lives With: Spouse  Home Layout: One level  Home Access: Stairs to enter with rails  Entrance Stairs - Number of Steps: 6  Entrance Stairs - Rails: Both  Bathroom Shower/Tub: Walk-in shower, Shower chair with back  Home Equipment: derek Sykes [employer] Help From: Family  ADL Assistance: Needs assistance  Toileting: Independent  Homemaking Assistance: Needs assistance  Meal Prep: Unable to assess (comment)  Laundry: Unable to assess (comment)  Vacuuming: Unable to assess (comment)  Cleaning: Unable to assess (comment)  Gardening: Unable to assess (comment)  Yard Work: Unable to assess (comment)  Driving: Unable to assess (comment)  Shopping: Unable to assess (comment)  : Unable to assess (comment)  Other (Comment):  Unable to assess (comment)  Ambulation Assistance: Needs assistance  Transfer Assistance: Needs assistance  Active : No  Mode of Transportation: Car  Occupation: Retired  Critical Behavior:  WNL    Strength:    Strength: Generally decreased, functional  Tone & Sensation:   Tone: Normal  Sensation: Intact  Range Of Motion:  AROM: Generally decreased, functional  Functional Mobility:  Bed Mobility:  Bed Mobility Training  Bed Mobility Training: Yes  Overall Level of Assistance: Modified independent  Supine to Sit: Modified independent  Sit to Supine: Modified independent  Scooting: Modified independent  Transfers:  Transfer Training  Transfer Training: Yes  Overall Level of Assistance: Modified independent  Sit to Stand: Modified independent;Supervision (supervision due to equipment)  Stand to Sit: Modified independent;Supervision (supervision due to equipment)  Balance:   Balance  Sitting: Intact  Standing: Intact  Ambulation/Gait Training:  Gait Training  Gait Training: Yes  Gait  Overall Level of Assistance: Modified independent;Supervision (supervision due to equipment)  Distance (ft): 38 Feet  Assistive Device: Walker, rolling;Gait belt  Pain:  Pain level pre-treatment: 0/10   Pain level post-treatment: 0/10   Pain Intervention(s): Medication (see MAR); Rest, Ice, Repositioning  Response to intervention: Nurse notified, See doc flow  Activity Tolerance:   Fair  Please refer to the flowsheet for vital signs taken during this treatment.     After treatment:   [x]         Patient left in no apparent distress sitting up EOB  []         Patient left in no apparent distress in bed  [x]         Call bell left within reach  [x]         Nursing notified  []         Caregiver present  []         Bed alarm activated  []         SCDs applied    COMMUNICATION/EDUCATION:   Patient Education  Education Given To: Patient  Education Provided: Energy Conservation (reminder to pace activity)  Education Method: Verbal  Barriers to Learning: Hearing  Education Outcome: Verbalized understanding;Demonstrated understanding    Thank you for this referral.  Daniela Chaparroo, PT  Minutes: 33      Eval Complexity: [facility] AM-PAC® Basic Mobility Inpatient Short Form (6-Clicks) Version 2    How much HELP from another person does the patient currently need    (If the patient hasn't done an activity recently, how much help from another person do you think he/she would need if he/she tried?)   Total (Total A or Dep)   A Lot  (Mod to Max A)   A Little (Sup or Min A)   None (Mod I to I)   Turning from your back to your side while in a flat bed without using bedrails? [] 1 [] 2 [] 3 [x] 4   2. Moving from lying on your back to sitting on the side of a flat bed without using bedrails? [] 1 [] 2 [] 3 [x] 4   3. Moving to and from a bed to a chair (including a wheelchair)? [] 1 [] 2 [x] 3 [] 4   4. Standing up from a chair using your arms (e.g., wheelchair, or bedside chair)? [] 1 [] 2 [] 3 [x] 4   5. Walking in hospital room? [] 1 [] 2 [x] 3 [] 4   6. Climbing 3-5 steps with a railing?+   [] 1 [] 2 [] 3 [] 4   +If stair climbing cannot be assessed, skip item #6. Sum responses from items 1-5. At this time and based on an AM-PAC score of /24 (or 18/20 if omitting stairs), no further PT is recommended upon discharge due to (i.e. patient at baseline functional statusetc). Recommend patient returns to prior setting with prior services.

## 2023-03-16 LAB
ANION GAP SERPL CALC-SCNC: 6 MMOL/L (ref 3–18)
BUN SERPL-MCNC: 48 MG/DL (ref 7–18)
BUN/CREAT SERPL: 29 (ref 12–20)
CALCIUM SERPL-MCNC: 7.3 MG/DL (ref 8.5–10.1)
CHLORIDE SERPL-SCNC: 105 MMOL/L (ref 100–111)
CO2 SERPL-SCNC: 26 MMOL/L (ref 21–32)
CREAT SERPL-MCNC: 1.68 MG/DL (ref 0.6–1.3)
ERYTHROCYTE [DISTWIDTH] IN BLOOD BY AUTOMATED COUNT: 14.3 % (ref 11.6–14.5)
GLUCOSE SERPL-MCNC: 164 MG/DL (ref 74–99)
HCT VFR BLD AUTO: 29.2 % (ref 36–48)
HGB BLD-MCNC: 9.5 G/DL (ref 13–16)
MCH RBC QN AUTO: 28.2 PG (ref 24–34)
MCHC RBC AUTO-ENTMCNC: 32.5 G/DL (ref 31–37)
MCV RBC AUTO: 86.6 FL (ref 78–100)
NRBC # BLD: 0 K/UL (ref 0–0.01)
NRBC BLD-RTO: 0 PER 100 WBC
PLATELET # BLD AUTO: 281 K/UL (ref 135–420)
PMV BLD AUTO: 9.2 FL (ref 9.2–11.8)
POTASSIUM SERPL-SCNC: 4.5 MMOL/L (ref 3.5–5.5)
RBC # BLD AUTO: 3.37 M/UL (ref 4.35–5.65)
SARS-COV-2 RDRP RESP QL NAA+PROBE: NOT DETECTED
SODIUM SERPL-SCNC: 137 MMOL/L (ref 136–145)
SOURCE: NORMAL
WBC # BLD AUTO: 9.4 K/UL (ref 4.6–13.2)

## 2023-03-16 PROCEDURE — 94640 AIRWAY INHALATION TREATMENT: CPT

## 2023-03-16 PROCEDURE — 87635 SARS-COV-2 COVID-19 AMP PRB: CPT

## 2023-03-16 PROCEDURE — 6370000000 HC RX 637 (ALT 250 FOR IP): Performed by: HOSPITALIST

## 2023-03-16 PROCEDURE — 36415 COLL VENOUS BLD VENIPUNCTURE: CPT

## 2023-03-16 PROCEDURE — 1100000000 HC RM PRIVATE

## 2023-03-16 PROCEDURE — 85027 COMPLETE CBC AUTOMATED: CPT

## 2023-03-16 PROCEDURE — 2700000000 HC OXYGEN THERAPY PER DAY

## 2023-03-16 PROCEDURE — 6370000000 HC RX 637 (ALT 250 FOR IP): Performed by: FAMILY MEDICINE

## 2023-03-16 PROCEDURE — 6360000002 HC RX W HCPCS: Performed by: HOSPITALIST

## 2023-03-16 PROCEDURE — 80048 BASIC METABOLIC PNL TOTAL CA: CPT

## 2023-03-16 PROCEDURE — 2580000003 HC RX 258: Performed by: HOSPITALIST

## 2023-03-16 RX ADMIN — APIXABAN 5 MG: 5 TABLET, FILM COATED ORAL at 22:37

## 2023-03-16 RX ADMIN — FUROSEMIDE 20 MG: 20 TABLET ORAL at 08:54

## 2023-03-16 RX ADMIN — AMLODIPINE BESYLATE 10 MG: 5 TABLET ORAL at 08:54

## 2023-03-16 RX ADMIN — METHYLPREDNISOLONE SODIUM SUCCINATE 40 MG: 40 INJECTION, POWDER, FOR SOLUTION INTRAMUSCULAR; INTRAVENOUS at 14:01

## 2023-03-16 RX ADMIN — Medication 5 MG: at 22:37

## 2023-03-16 RX ADMIN — BUDESONIDE 250 MCG: 0.25 INHALANT RESPIRATORY (INHALATION) at 19:59

## 2023-03-16 RX ADMIN — PANTOPRAZOLE SODIUM 40 MG: 40 TABLET, DELAYED RELEASE ORAL at 06:50

## 2023-03-16 RX ADMIN — TAMSULOSIN HYDROCHLORIDE 0.4 MG: 0.4 CAPSULE ORAL at 17:40

## 2023-03-16 RX ADMIN — METHYLPREDNISOLONE SODIUM SUCCINATE 40 MG: 40 INJECTION, POWDER, FOR SOLUTION INTRAMUSCULAR; INTRAVENOUS at 00:07

## 2023-03-16 RX ADMIN — FERROUS SULFATE TAB 325 MG (65 MG ELEMENTAL FE) 325 MG: 325 (65 FE) TAB at 08:54

## 2023-03-16 RX ADMIN — ARFORMOTEROL TARTRATE 15 MCG: 15 SOLUTION RESPIRATORY (INHALATION) at 07:53

## 2023-03-16 RX ADMIN — QUETIAPINE FUMARATE 25 MG: 25 TABLET ORAL at 22:37

## 2023-03-16 RX ADMIN — PANTOPRAZOLE SODIUM 40 MG: 40 TABLET, DELAYED RELEASE ORAL at 16:07

## 2023-03-16 RX ADMIN — FERROUS SULFATE TAB 325 MG (65 MG ELEMENTAL FE) 325 MG: 325 (65 FE) TAB at 17:40

## 2023-03-16 RX ADMIN — ARFORMOTEROL TARTRATE 15 MCG: 15 SOLUTION RESPIRATORY (INHALATION) at 19:59

## 2023-03-16 RX ADMIN — AZITHROMYCIN MONOHYDRATE 500 MG: 500 INJECTION, POWDER, LYOPHILIZED, FOR SOLUTION INTRAVENOUS at 16:08

## 2023-03-16 RX ADMIN — CEFTRIAXONE 1000 MG: 1 INJECTION, POWDER, FOR SOLUTION INTRAMUSCULAR; INTRAVENOUS at 22:36

## 2023-03-16 RX ADMIN — APIXABAN 5 MG: 5 TABLET, FILM COATED ORAL at 08:54

## 2023-03-16 RX ADMIN — BUDESONIDE 250 MCG: 0.25 INHALANT RESPIRATORY (INHALATION) at 07:54

## 2023-03-16 ASSESSMENT — PAIN SCALES - GENERAL: PAINLEVEL_OUTOF10: 0

## 2023-03-16 NOTE — PROGRESS NOTES
Hospitalist Progress Note    Patient: Jalyn Boyd MRN: [id number]  CSN: [id number]    YOB: 1945  Age: 76 y.o. Sex: male    DOA: 3/13/2023 LOS:  LOS: 3 days                Assessment/Plan     Active Hospital Problems    Diagnosis     COPD (chronic obstructive pulmonary disease) ([facility] [J44.9]      Priority: Medium    Bilateral deafness [H91.93]     Acute exacerbation of chronic obstructive pulmonary disease (COPD) ([facility] [J44.1]     Hypertension [I10]     PAF (paroxysmal atrial fibrillation) ([facility]) [I48.0]     Acute on chronic respiratory failure with hypoxemia ([facility]) [J96.21]     Chronic renal disease, stage 3, moderately decreased glomerular filtration rate between 30-59 mL/min/1.73 square meter ([facility] [N18.30]         Chief complaint :  SOB  76 y.o. male with chronic respiratory failure, COPD, PAF, HTN, presents to ER with concerns of SOB. Feeling better    Acute on chronic respiratory failure-    2nd to COPD exac. On NC O2. COPD with acute exacerbation-    on Brovana and Pulmicort nebulizers   Solumedrol   Neb treatment as needed. Paroxysmal atrial fib  -    rate controlled. on Eliquis. Chronic kidney disease -    Avoid nephrotoxic agents trend BMP        Hard of hearing With early dementia -    high risks for fall. Called and discussed with wife. She wants patient to go to NH    Disposition : 1-2 days    Review of systems  General: No fevers or chills. Cardiovascular: No chest pain or pressure. No palpitations. Pulmonary: No shortness of breath. Gastrointestinal: No nausea, vomiting. Physical Exam:  General: Awake, cooperative, no acute distress    HEENT: NC, Atraumatic. PERRLA, anicteric sclerae. Lungs: CTA Bilaterally. No Wheezing/Rhonchi/Rales. Heart:  S1 S2,  No murmur, No Rubs, No Gallops  Abdomen: Soft, Non distended, Non tender. +Bowel sounds,   Extremities: No c/c/e  Psych:   Not anxious or agitated. Neurologic:  No acute neurological deficit. Vital signs/Intake and Output:  Visit Vitals  /68   Pulse 96   Temp 97 °F (36.1 °C) (Axillary)   Resp 18   Ht 5' 9\" (1.753 m)   Wt 143 lb 4.8 oz (65 kg)   SpO2 100%   BMI 21.16 kg/m²     Current Shift:  03/16 0701 - 03/16 1900  In: 240 [P.O.:240]  Out: 250 [Urine:250]  Last three shifts:  03/14 1901 - 03/16 0700  In: 120 [P.O.:120]  Out: 2200 [Urine:2200]            Labs: Results:       Chemistry Recent Labs     03/14/23  0625 03/15/23  0336 03/16/23  0111    138 137   K 4.4 4.0 4.5    106 105   CO2 25 25 26   BUN 26* 39* 48*   ,  Lab Results   Component Value Date/Time    LACTA 0.9 02/06/2023 03:03 AM      CBC w/Diff Recent Labs     03/14/23  0625 03/15/23  0336 03/16/23  0111   WBC 5.6 8.5 9.4   RBC 3.94* 3.23* 3.37*   HGB 11.0* 9.0* 9.5*   HCT 33.6* 27.3* 29.2*    266 281      Cardiac Enzymes Lab Results   Component Value Date    CKTOTAL 28 (L) 05/02/2022    CKMB 1.5 09/30/2021    CKMBINDEX 6.0 (H) 09/30/2021    TROPONINI <0.02 09/30/2021   ,  Lab Results   Component Value Date     01/25/2023      Coagulation No results for input(s): INR, APTT in the last 72 hours. Invalid input(s): PTP    Lipid Panel Lab Results   Component Value Date/Time    CHOL 172 04/02/2022 03:23 AM    HDL 39 04/02/2022 03:23 AM      Pancreas No results for input(s): LIPASE in the last 72 hours. ,No results for input(s): AMYLASE in the last 72 hours. Liver Enzymes No results for input(s): TP, ALB in the last 72 hours. Invalid input(s): TBIL, AP, SGOT, GPT, DBIL   Thyroid Studies Lab Results   Component Value Date/Time    TSH 3.06 04/05/2021 01:30 PM        Procedures/imaging: see electronic medical records for all procedures/Xrays and details which were not copied into this note but were reviewed prior to creation of Plan    TIME: E/M Time spent with patient and patient care issues: [] 15-20 min  [] 21-30 min  [x] 31-44 min  [] 45 min or more.      This time also includes physician non-face-to-face service time visit on the date of service such as  Preparing to see the patient (eg, review of tests)  Obtaining and/or reviewing separately obtained history  Performing a medically necessary appropriate examination and/or evaluation  Counseling and educating the patient/family/caregiver  Ordering medications, tests, or procedures  Referring and communicating with other health care professionals as needed  Documenting clinical information in the electronic or other health record  Independently interpreting results (not reported separately) and communicating results to the patient/family/caregiver  Care coordination and discharge planning with Case Management.

## 2023-03-16 NOTE — PROGRESS NOTES
D/C Plan: [facility]     CM has been notified pt is medically stable to  transition from an acute care setting. Pt's wife, Anibal Lees ([phone]), would like pt to transition to SNF. Pt has been accepted by [facility] and [facility]. CM notified pt's wife. Family has selected [facility]. CM notified pt's wife that pt is not agreeable to SNF. Family spoke with pt and per family pt is now agreeable to SNF at this facility. CM notified family that pt will be discharge tomorrow either to [address] or to return home. Family verbalized an understanding. CM has requested transport be arranged to the above facility tomorrow after 4:00pm.  CM has completed LTSS/UAI screening to assist with care transition. CM to continue to follow and assist.    Transition of Care Plan: [facility]    Communication to Patient/Family:    Met with patient and family, who are agreeable to the transition plan. The Plan for Transition of Care is related to the following treatment goals: SNF    The Patient and/or patient representative  was provided with a choice of provider and agrees   with the discharge plan. Yes [x] No []    Freedom of choice list was provided with basic dialogue that supports the patient's individualized plan of care/goals and shares the quality data associated with the providers. Yes [x] No []    SNF/Rehab Transition:  Patient has been accepted to [address] [place] [address] at [facility], [address] [place] [address] for SNF and meets criteria for admission. Patient will transported by medical transport and expected to leave tomorrow (3/17/23).     Communication to SNF/Rehab:  Bedside RN,  has been notified to update the transition plan to the facility and call report [phone]     Discharge information has been updated on the AVS    Discharge instructions to be fax'd to facility at [phone]  Please include all hard scripts for controlled substances, med rec and dc summary, and AVS in packet. Please medicate for pain prior to dc if possible and needed to help offset delay when patient first arrives to facility. Reviewed and confirmed with facility, [facility] can manage the patient care needs for the following:     Yenny Bartholomew with (X) only those applicable:  Medication:  []Medications are available at the facility  []IV Antibiotics    []Controlled Substance - hard copies available sent. []Weekly Labs    Equipment:  []CPAP/BiPAP  []Wound Vacuum  [][place] or Urinary Device  []PICC/Central Line  []Nebulizer  []Ventilator    Treatment:  []Isolation (for MRSA, VRE, etc.)  []Surgical Drain Management  []Tracheostomy Care  []Dressing Changes  []Dialysis with transportation  []PEG Care  []Oxygen  []Daily Weights for Heart Failure    Dietary:  []Any diet limitations  []Tube Feedings   []Total Parenteral Management (TPN)    Financial Resources:  []Medicaid Application Completed    [x]UAI Completed and copy given to pt/family. []A screening has previously been completed. []Level II Completed    [] Private pay individual who will not become   financially eligible for Medicaid within 6 months from admission to a [address] [place] [address]. [] Individual refused to have screening conducted. []Medicaid Application Completed    []The screening denied because it was determined individual did not need/did not qualify for nursing facility level of care. [] Out of state residents seeking direct admission to a [address] facility.   [] Individuals who are inpatients of an out of state hospital, or in state or out of state veterans/ hospital and seek direct admission to a [address] facility  [] Individuals who are pateints or residents of a state owned/operated facility that is licensed by Department of [employer] ([facility]) and seek direct admission to [address] [place] [address]  [] A screening not required for enrollment in [facility] services as set out in [facility]-  [] [facility]) staff shall perform screenings of the [facility] clients. Advanced Care Plan:  []Surrogate Decision Maker of Care  []POA  []Communicated Code Status and copy sent.     Other:

## 2023-03-17 VITALS
HEIGHT: 69 IN | OXYGEN SATURATION: 100 % | HEART RATE: 84 BPM | WEIGHT: 143.3 LBS | BODY MASS INDEX: 21.22 KG/M2 | TEMPERATURE: 97.3 F | DIASTOLIC BLOOD PRESSURE: 73 MMHG | RESPIRATION RATE: 18 BRPM | SYSTOLIC BLOOD PRESSURE: 122 MMHG

## 2023-03-17 LAB
EKG ATRIAL RATE: 81 BPM
EKG DIAGNOSIS: NORMAL
EKG P AXIS: 72 DEGREES
EKG P-R INTERVAL: 162 MS
EKG Q-T INTERVAL: 378 MS
EKG QRS DURATION: 76 MS
EKG QTC CALCULATION (BAZETT): 439 MS
EKG R AXIS: -10 DEGREES
EKG T AXIS: 81 DEGREES
EKG VENTRICULAR RATE: 81 BPM

## 2023-03-17 PROCEDURE — 6360000002 HC RX W HCPCS: Performed by: HOSPITALIST

## 2023-03-17 PROCEDURE — 94640 AIRWAY INHALATION TREATMENT: CPT

## 2023-03-17 PROCEDURE — 2700000000 HC OXYGEN THERAPY PER DAY

## 2023-03-17 PROCEDURE — 6370000000 HC RX 637 (ALT 250 FOR IP): Performed by: HOSPITALIST

## 2023-03-17 RX ORDER — PREDNISONE 10 MG/1
TABLET ORAL
Qty: 20 TABLET | Refills: 0
Start: 2023-03-17

## 2023-03-17 RX ADMIN — ARFORMOTEROL TARTRATE 15 MCG: 15 SOLUTION RESPIRATORY (INHALATION) at 08:53

## 2023-03-17 RX ADMIN — AMLODIPINE BESYLATE 10 MG: 5 TABLET ORAL at 08:39

## 2023-03-17 RX ADMIN — FERROUS SULFATE TAB 325 MG (65 MG ELEMENTAL FE) 325 MG: 325 (65 FE) TAB at 08:39

## 2023-03-17 RX ADMIN — FUROSEMIDE 20 MG: 20 TABLET ORAL at 08:39

## 2023-03-17 RX ADMIN — PANTOPRAZOLE SODIUM 40 MG: 40 TABLET, DELAYED RELEASE ORAL at 06:24

## 2023-03-17 RX ADMIN — BUDESONIDE 250 MCG: 0.25 INHALANT RESPIRATORY (INHALATION) at 08:53

## 2023-03-17 RX ADMIN — APIXABAN 5 MG: 5 TABLET, FILM COATED ORAL at 08:39

## 2023-03-17 RX ADMIN — METHYLPREDNISOLONE SODIUM SUCCINATE 40 MG: 40 INJECTION, POWDER, FOR SOLUTION INTRAMUSCULAR; INTRAVENOUS at 02:30

## 2023-03-17 RX ADMIN — METHYLPREDNISOLONE SODIUM SUCCINATE 40 MG: 40 INJECTION, POWDER, FOR SOLUTION INTRAMUSCULAR; INTRAVENOUS at 13:26

## 2023-03-17 NOTE — DISCHARGE SUMMARY
Discharge Summary    Patient: Garett Funez MRN: [id number]  CSN: [id number]    YOB: 1945  Age: 76 y.o. Sex: male    DOA: 3/13/2023 LOS:  LOS: 4 days   Discharge Date:      Primary Care Provider:  Pramod Smith MD    Admission Diagnoses: COPD (chronic obstructive pulmonary disease) ([facility] [J44.9]  COPD exacerbation ([facility] [J44.1]    Discharge Diagnoses: Active Hospital Problems    Diagnosis     COPD (chronic obstructive pulmonary disease) ([facility] [J44.9]      Priority: Medium    Bilateral deafness [H91.93]     Acute exacerbation of chronic obstructive pulmonary disease (COPD) ([facility]) [J44.1]     Hypertension [I10]     PAF (paroxysmal atrial fibrillation) ([facility]) [I48.0]     Acute on chronic respiratory failure with hypoxemia ([facility]) [J96.21]     Chronic renal disease, stage 3, moderately decreased glomerular filtration rate between 30-59 mL/min/1.73 square meter ([facility] [N18.30]        Discharge Medications:        Medication List        START taking these medications      apixaban 5 MG Tabs tablet  Commonly known as: ELIQUIS     ipratropium-albuterol 0.5-2.5 (3) MG/3ML Soln nebulizer solution  Commonly known as: DUONEB     pantoprazole 40 MG tablet  Commonly known as: PROTONIX     predniSONE 10 MG tablet  Commonly known as: DELTASONE  Take 40 mg oral for 2 days then take 30 mg oral for 2 days then 20 mg oral for 2 days then 10 oral for 2 days.  Disp 20     tamsulosin 0.4 MG capsule  Commonly known as: FLOMAX            CONTINUE taking these medications      amLODIPine 10 MG tablet  Commonly known as: NORVASC     busPIRone 5 MG tablet  Commonly known as: BUSPAR     donepezil 10 MG tablet  Commonly known as: ARICEPT     furosemide 20 MG tablet  Commonly known as: LASIX     traZODone 50 MG tablet  Commonly known as: DESYREL            STOP taking these medications      albuterol (2.5 MG/3ML) 0.083% nebulizer solution  Commonly known as: PROVENTIL     albuterol sulfate  (90 Base) MCG/ACT inhaler  Commonly known as: PROVENTIL;VENTOLIN;PROAIR     DAPTOMYCIN IV     ferrous sulfate 325 (65 Fe) MG tablet  Commonly known as: IRON 325               Where to Get Your Medications        Information about where to get these medications is not yet available    Ask your nurse or doctor about these medications  predniSONE 10 MG tablet         Discharge Condition: Good    Procedures : None    Consults: None      PHYSICAL EXAM   Visit Vitals  /73   Pulse 84   Temp 97.3 °F (36.3 °C) (Oral)   Resp 18   Ht 5' 9\" (1.753 m)   Wt 143 lb 4.8 oz (65 kg)   SpO2 100%   BMI 21.16 kg/m²     General: Awake, cooperative, no acute distress    HEENT: NC, Atraumatic. PERRLA, EOMI. Anicteric sclerae. Lungs:  CTA Bilaterally. No Wheezing/Rhonchi/Rales. Heart:  Regular  rhythm,  No murmur, No Rubs, No Gallops  Abdomen: Soft, Non distended, Non tender. +Bowel sounds,   Extremities: No c/c/e  Psych:   Not anxious or agitated. Neurologic:  No acute neurological deficits. Admission HPI :   Ajit Mccallum is a 76 y.o. male with chronic respiratory failure, COPD, PAF, HTN, presents to ER with concerns of SOB since yesterday. He is very hard of hearing. It has been getting progressively worse. He has been using neb treatment  with no help. associated with productive cough, He denies any fever/chills, chest pain, N/V.  EMS was called and found his oxygen saturation 82%, he received breathing treatment with improvement in symptoms. In ER he received solumedrol and neb treatment and he was still wheezing. CXR with stable right lung infiltrate and volume loss, stable left basilar interstitial prominence. Hospital Course :   Mr. Rj Mathur was admitted to monitored floor, he did not had any acute events during hospitalization. Acute on chronic respiratory failure-   Secondary to COPD exacerbation. On NC O2, now weaned down to his home oxygen to 2L.        COPD with acute exacerbation-    started on Brovana and Pulmicort nebulizers   Solumedrol   Neb treatment as needed. He had improvement in his symptoms and wheezing. Will discharge on prednisone taper dose      Paroxysmal atrial fib  -    rate controlled. continued on Eliquis. Chronic kidney disease -    Avoided nephrotoxic agents, monitored renal function. Hard of hearing With early dementia -    high risks for fall. Worked with PT/OT, will discharge to SNF    Activity: activity as tolerated    Diet: cardiac diet    Follow-up: PCP    Disposition: SNF    TIME: E/M Time spent with patient and patient care issues: [] 15-20 min  [] 21-30 min  [x] 31-44 min  [] 45 min or more. This time also includes physician non-face-to-face service time visit on the date of service such as  Preparing to see the patient (eg, review of tests)  Obtaining and/or reviewing separately obtained history  Performing a medically necessary appropriate examination and/or evaluation  Counseling and educating the patient/family/caregiver  Ordering medications, tests, or procedures  Referring and communicating with other health care professionals as needed  Documenting clinical information in the electronic or other health record  Independently interpreting results (not reported separately) and communicating results to the patient/family/caregiver  Care coordination and discharge planning along with case management. Labs: Results:       Chemistry Recent Labs     03/15/23  0336 03/16/23  0111    137   K 4.0 4.5    105   CO2 25 26   BUN 39* 48*      CBC w/Diff Recent Labs     03/15/23  0336 03/16/23  0111   WBC 8.5 9.4   RBC 3.23* 3.37*   HGB 9.0* 9.5*   HCT 27.3* 29.2*    281      Cardiac Enzymes No results for input(s): CPK, RED in the last 72 hours. Invalid input(s): CKRMB, CKND1, TROIP   Coagulation No results for input(s): INR, APTT in the last 72 hours.     Invalid input(s): PTP    Lipid Panel Lab Results   Component Value Date/Time    CHOL 172 04/02/2022 03:23 AM    HDL 39 04/02/2022 03:23 AM      BNP Invalid input(s): BNPP   Liver Enzymes No results for input(s): TP, ALB in the last 72 hours. Invalid input(s): TBIL, AP, SGOT, GPT, DBIL   Thyroid Studies Lab Results   Component Value Date/Time    TSH 3.06 04/05/2021 01:30 PM            Significant Diagnostic Studies: XR CHEST (2 VW)    Result Date: 3/13/2023  EXAM: XR CHEST (2 VW) INDICATION: Shortness of breath COMPARISON: 2/6/2023 TECHNIQUE: PA and lateral chest views FINDINGS: The cardiac size is within normal limits. Stable right mid and lower lung field infiltrate with right apical interstitial prominence and atelectasis or scar. Stable left basilar atelectasis or scar with interstitial prominence. Stable elevation of the right hemidiaphragm. The visualized bones and upper abdomen are age-appropriate. Stable right lung infiltrate and volume loss. Stable left basilar interstitial prominence. @[facility]([id number])@       Please note that this dictation was completed with V[employer]el ([employer]), the [employer] voice recognition software. Quite often unanticipated grammatical, syntax, homophones, and other interpretive errors are inadvertently transcribed by the computer software. Please disregard these errors. Please excuse any errors that have escaped final proofreading.      CC: Linnette Cordon MD

## 2023-03-23 NOTE — PROGRESS NOTES
Physician Progress Note      César Alvarenga  [facility] #:                  [id number]  :                       1945  ADMIT DATE:       3/13/2023 12:49 PM  [address] [place] DATE:        3/17/2023 4:15 PM  RESPONDING  PROVIDER #:        Kiran Thakkar MD          QUERY TEXT:    Patient admitted with COPD exacerbation and on home O2 . Noted documentation   of acute on chronic respiratory failure in H&P/Hospitalist progress note on   3/13/23. In order to support the diagnosis of acute respiratory failure,   please include additional clinical indicators in your documentation. Or   please document if the diagnosis of acute respiratory failure has been ruled   out after further study. The medical record reflects the following:  Risk Factors: Copd,  Clinical Indicators: PER ED Nurse \" co SOB x 1 day, per ems pt was 84% on RA   upon their arrival,  pt with increased WOB upon arrival to ED aeb accessory   muscle use, pt is able to make needs known speaking in 3-4 word sentences\"    3/13/ ED/He arrives via EMS. When they arrived at the house his oxygen   saturation was 82% on room air, this did improve with a breathing treatment in   route    3/13/ Hosp/ Acute on chronic respiratory failure-?  ?2nd to COPD exac. On NC O2.   Treatment: O2 2L NC    Thank You  Sandra Christopher RN ,CDI, CRCR    Acute Respiratory Failure Clinical Indicators per [employer] Training Guide and   Quick Reference Guide:  pO2 < 60 mmHg or SpO2 (pulse oximetry) < 91% breathing room air  pCO2 > 50 and pH < 7.35  P/F ratio (pO2 / FIO2) < 300  pO2 decrease or pCO2 increase by 10 mmHg from baseline (if known)  Supplemental oxygen of 40% or more  Presence of respiratory distress, tachypnea, dyspnea, shortness of breath,   wheezing  Unable to speak in complete sentences  Use of accessory muscles to breathe  Extreme anxiety and feeling of impending doom  Tripod position  Confusion/altered mental status/obtunded  Options provided:  -- Acute on chronic Respiratory Failure as evidenced by, Please document   evidence. -- Acute on chronic Respiratory Failure ruled out after study  -- Acute Respiratory Failure ruled out after study and Chronic Respiratory   Failure confirmed  -- Other - I will add my own diagnosis  -- Disagree - Not applicable / Not valid  -- Disagree - Clinically unable to determine / Unknown  -- Refer to Clinical Documentation Reviewer    PROVIDER RESPONSE TEXT:    Acute on chronic Respiratory Failure has been ruled out after study. Query created by: Arti Loyola on 3/22/2023 6:57 AM      Electronically signed by:   Maximo Lyman MD 3/23/2023 3:18 PM

## 2023-05-18 NOTE — ED NOTES
Spoke with Abebe at this time. Was advised the  would put in a consult and [facility] would be giving us a call back.       William Singletary RN  05/18/23 0619

## 2023-05-19 PROBLEM — J44.9 END STAGE COPD (HCC): Status: ACTIVE | Noted: 2023-01-01

## 2023-05-19 NOTE — ED NOTES
If you have an active [facility] account, please look for your well child questionnaire to come to your [facility] account before your next well child visit.    Well-Baby Checkup: 2 Months     You may have noticed your baby smiling at the sound of your voice. This is called a social smile.     At the 2-month checkup, the healthcare provider will examine the baby and ask how things are going at home. This sheet describes some of what you can expect.  Development and milestones  The healthcare provider will ask questions about your baby. He or she will observe the baby to get an idea of the infants development. By this visit, your baby is likely doing some of the following:  · Smiling on purpose, such as in response to another person (called a social smile)  · Batting or swiping at nearby objects  · Following you with his or her eyes as you move around a room  · Beginning to lift or control his or her head  Feeding tips  Continue to feed your baby either breastmilk or formula. To help your baby eat well:  · During the day, feed at least every 2 to 3 hours. You may need to wake the baby for daytime feedings.  · At night, feed when the baby wakes, often every 3 to 4 hours. Its OK if the baby sleeps longer than this. You likely dont need to wake the baby for nighttime feedings.  · Breastfeeding sessions should last around 10 to 15 minutes. With a bottle, give your baby 4 to 6 ounces of breastmilk or formula.  · If youre concerned about how much or how often your baby eats, discuss this with the healthcare provider.  · Ask the healthcare provider if your baby should take vitamin D.  · Dont give your baby anything to eat besides breastmilk or formula. Your baby is too young for solid foods (solids) or other liquids. A young infant should not be given plain water.  · Be aware that many babies of 2 months spit up after feeding. In most cases, this is normal. Call the healthcare provider right away if the baby  Goal of diltiazem per MD Richard Adamson is HR less than 90 verbal.     Dalila Claudio RN  05/18/23 7924 spits up often and forcefully, or spits up anything besides milk or formula.   Hygiene tips  · Some babies poop (have bowel movements) a few times a day. Others poop as little as once every 2 to 3 days. Anything in this range is normal.  · Its fine if your baby poops even less often than every 2 to 3 days if the baby is otherwise healthy. But if the baby also becomes fussy, spits up more than normal, eats less than normal, or has very hard stool, tell the healthcare provider. The baby may be constipated (unable to have a bowel movement).  · Stool may range in color from mustard yellow to brown to green. If its another color, tell the healthcare provider.  · Bathe your baby a few times per week. You may give baths more often if the baby seems to like it. But because youre cleaning the baby during diaper changes, a daily bath often isnt needed.  · Its OK to use mild (hypoallergenic) creams or lotions on the babys skin. Don't put lotion on the babys hands.  Sleeping tips  At 2 months, most babies sleep around 15 to 18 hours each day. Its common to sleep for short spurts throughout the day, rather than for hours at a time. The baby may be fussy before going to bed for the night, around 6 p.m. to 9 p.m. This is normal. To help your baby sleep safely and soundly follow the tips below:  · Put your baby on his or her back for naps and sleeping until your child is 1 year old. This can lower the risk for SIDS, aspiration, and choking. Never put your baby on his or her side or stomach for sleep or naps. When your baby is awake, let your child spend time on his or her tummy as long as you are watching your child. This helps your child build strong tummy and neck muscles. This will also help keep your baby's head from flattening. This problem can happen when babies spend so much time on their back.  · Ask the healthcare provider if you should let your baby sleep with a pacifier. Sleeping with a pacifier has been shown to  decrease the risk for SIDS. But don't offer it until after breastfeeding has been established. If your baby doesnt want the pacifier, dont try to force him or her to take one.  · Dont put a crib bumper, pillow, loose blankets, or stuffed animals in the crib. These could suffocate the baby.  · Swaddling means wrapping your  baby snugly in a blanket, but with enough space so he or she can move hips and legs. Swaddling can help the baby feel safe and fall asleep. You can buy a special swaddling blanket designed to make swaddling easier. But dont use swaddling if your baby is 2 months or older, or if your baby can roll over on his or her own. Swaddling may raise the risk for SIDS (sudden infant death syndrome) if the swaddled baby rolls onto his or her stomach. Your baby's legs should be able to move up and out at the hips. Dont place your babys legs so that they are held together and straight down. This raises the risk that the hip joints wont grow and develop correctly. This can cause a problem called hip dysplasia and dislocation. Also be careful of swaddling your baby if the weather is warm or hot. Using a thick blanket in warm weather can make your baby overheat. Instead use a lighter blanket or sheet to swaddle the baby.   · Don't put your baby on a couch or armchair for sleep. Sleeping on a couch or armchair puts the baby at a much higher risk for death, including SIDS.  · Don't use infant seats, car seats, strollers, infant carriers, or infant swings for routine sleep and daily naps. These may cause a baby's airway to become blocked or the baby to suffocate.  · Its OK to put the baby to bed awake. Its also OK to let the baby cry in bed for a short time, but no longer than a few minutes. At this age babies arent ready to cry themselves to sleep.  · If you have trouble getting your baby to sleep, ask the healthcare provider for tips.  · Don't share a bed (co-sleep) with your baby. Bed-sharing  has been shown to increase the risk for SIDS. The American Academy of Pediatrics says that babies should sleep in the same room as their parents. They should be close to their parents' bed, but in a separate bed or crib. This sleeping setup should be done for the baby's first year, if possible. But you should do it for at least the first 6 months.  · Always put cribs, bassinets, and play yards in areas with no hazards. This means no dangling cords, wires, or window coverings. This will lower the risk for strangulation.  · Don't use baby heart rate and monitors or special devices to help lower the risk for SIDS. These devices include wedges, positioners, and special mattresses. These devices have not been shown to prevent SIDS. In rare cases, they have caused the death of a baby.  · Talk with your baby's healthcare provider about these and other health and safety issues.  Safety tips  · To avoid burns, dont carry or drink hot liquids, such as coffee or tea, near the baby. Turn the water heater down to a temperature of 120.0°F (49.0°C) or below.  · Dont smoke or allow others to smoke near the baby. If you or other family members smoke, do so outdoors while wearing a jacket, and then remove the jacket before holding the baby. Never smoke around the baby.  · Its fine to bring your baby out of the house. But stay away from confined, crowded places where germs can spread.  · When you take the baby outside, don't stay too long in direct sunlight. Keep the baby covered, or seek out the shade.  · In the car, always put the baby in a rear-facing car seat. This should be secured in the back seat according to the car seats directions. Never leave the baby alone in the car.  · Dont leave the baby on a high surface such as a table, bed, or couch. He or she could fall and get hurt. Also, dont place the baby in a bouncy seat on a high surface.  · Older siblings can hold and play with the baby as long as an adult  supervises.   · Call the healthcare provider right away if the baby is under 3 months of age and has a fever (see Fever and children below).     Fever and children  Always use a digital thermometer to check your childs temperature. Never use a mercury thermometer.  For infants and toddlers, be sure to use a rectal thermometer correctly. A rectal thermometer may accidentally poke a hole in (perforate) the rectum. It may also pass on germs from the stool. Always follow the product makers directions for proper use. If you dont feel comfortable taking a rectal temperature, use another method. When you talk to your childs healthcare provider, tell him or her which method you used to take your childs temperature.  Here are guidelines for fever temperature. Ear temperatures arent accurate before 6 months of age. Dont take an oral temperature until your child is at least 4 years old.  Infant under 3 months old:  · Ask your childs healthcare provider how you should take the temperature.  · Rectal or forehead (temporal artery) temperature of 100.4°F (38°C) or higher, or as directed by the provider  · Armpit temperature of 99°F (37.2°C) or higher, or as directed by the provider      Vaccines  Based on recommendations from the CDC, at this visit your baby may get the following vaccines:  · Diphtheria, tetanus, and pertussis  · Haemophilus influenzae type b  · Hepatitis B  · Pneumococcus  · Polio  · Rotavirus  Vaccines help keep your baby healthy  Vaccines (also called immunizations) help a babys body build up defenses against serious diseases. Having your baby fully vaccinated will also help lower your baby's risk for SIDS. Many are given in a series of doses. To be protected, your baby needs each dose at the right time. Many combination vaccines are available. These can help reduce the number of needlesticks needed to vaccinate your baby against all of these important diseases. Talk with your child's healthcare  provider about the benefits of vaccines and any risks they may have. Also ask what to do if your baby misses a dose. If this happens, your baby will need catch-up vaccines to be fully protected. After vaccines are given, some babies have mild side effects such as redness and swelling where the shot was given, fever, fussiness, or sleepiness. Talk with the provider about how to manage these.      Next checkup at: __6 mos of age_____________________________     PARENT NOTES:tylenol 80 mg, 2.5 ml, every 4 hours as needed  Date Last Reviewed: 11/1/2016  © 9420-4600 [employer]. [address] [place] [address], [place] [address]. All rights reserved. This information is not intended as a substitute for professional medical care. Always follow your healthcare professional's instructions.

## 2023-05-19 NOTE — ED PROVIDER NOTES
PM  Patient is more awake and alert. He has been complaining of some chest discomfort most likely due to chest compressions. No obvious rib fractures or pneumothorax seen on x-ray imaging. X-ray imaging does show possible infiltrates will obtain blood cultures POC lactate and start the patient on Rocephin and azithromycin. Patient is currently on diltiazem drip with rate control. Consult note:  Case discussed with hospitalist patient will be evaluate admitted. FINAL IMPRESSION     1. Acute respiratory failure with hypoxia and hypercapnia (HCC)    2. Acute exacerbation of chronic obstructive pulmonary disease (COPD) ([facility]    3. Atrial fibrillation with RVR ([facility]          DISPOSITION/PLAN   Kike Durand's  results have been reviewed with him. He has been counseled regarding his diagnosis, treatment, and plan. He verbally conveys understanding and agreement of the signs, symptoms, diagnosis, treatment and prognosis and additionally agrees to follow up as discussed. He also agrees with the care-plan and conveys that all of his questions have been answered. CLINICAL IMPRESSION    Admit Note: Pt is being admitted by Dr. Matty Loya. The results of their tests and reason(s) for their admission have been discussed with pt and/or available family. They convey agreement and understanding for the need to be admitted and for the admission diagnosis. I am the Primary Clinician of Record. Lazarus Valdes DO (electronically signed)    (Please note that parts of this dictation were completed with voice recognition software. Quite often unanticipated grammatical, syntax, homophones, and other interpretive errors are inadvertently transcribed by the computer software. Please disregards these errors.  Please excuse any errors that have escaped final proofreading.)         Paul Simmons DO  05/18/23 2046

## 2023-05-19 NOTE — PROGRESS NOTES
Bedside and Verbal shift change report given to Alyssia Donis RN (oncoming nurse) by Corinne Kiran RN (offgoing nurse). Report included the following information Nurse Handoff Report, Intake/Output, MAR, Recent Results, and Cardiac Rhythm SR . Patient on comfort care. Son at bedside. 1:34PM- Patient has not voided, bladder scanned with 435 ml or urine retained noted. Dr. Lashawn Diaz is aware with order to perform straight catheter x 1. Family informed but family wanted to wait and try on offering urinal to the patient. 2:35PM- Hospice RN seen patient and writer spoke with her. Also discussed patient retaining urine but family wanted to wait for the meantime. Hospice Rn states they will put in an order for hodges insertion. Family made aware and agreeable. 2:58PM- textpaged Remy Nobles Rn to follow-up order. Order placed and carried out. 3:30PM- Family wanted to try offering urinal again to patient prior to hodges ionsertion. Assisted patient to stand at bedside and offered urinal, tolerated well, but has not voided. Family now agreeable to insert hodges, patient informed and also agreeable. 3:50PM- Hodges inserted with LACIE Chaudhari, patient tolerated procedure well.

## 2023-05-19 NOTE — CARE COORDINATION
Care Management Initial Assessment       RUR: 19% moderate risk  Readmission? No  1st IM letter given? No-CM informed registration  1st  letter given: No    Initial note: [facility]. CM met with pt but pt was unable to complete initial assessment. Pt's dtr, Eladia Plasencia was in pt's room but Ms. Bárbara Plasencia referral CM to speak to pt's other dtr, Gerardo Mead. After reviewing pt's chart, CM completed initial assessment via chart review. Complete assessment is below:          05/19/23 4728   Service Assessment   Patient Orientation Unable to Assess   History Provided By Medical Record   Primary Caregiver Other (Comment)  ([facility])   [facility];Family Members; /; Hospice   Patient's Healthcare Decision Maker is: Named in [address] [place] [address]   PCP Verified by CM Yes   Last Visit to PCP Within last 3 months   Prior Functional Level Assistance with the following:;Bathing;Dressing; Mobility; Toileting;Feeding;Cooking;Housework; Shopping   Current Functional Level Assistance with the following:;Bathing;Dressing; Toileting;Feeding;Cooking;Housework; Shopping;Mobility   Can patient return to prior living arrangement Yes   Family able to assist with home care needs: No   Financial Resources Field Agent; Other (Comment)  ([facility] and ZAIDA Roberto)   Social/Functional History   Lives With Other (comment)  ([facility])   [address] [facility] bed; Wheelchair-manual   Active  No   Patient's  Info Private transport   Discharge Planning   Type of Residence Long-Term Care   Current Services Prior To Admission Hospice Services;Skilled [address]  ([facility])   Patient expects to be discharged to:  Unknown     Derek Chao    [phone]

## 2023-05-19 NOTE — PROGRESS NOTES
Pt on comfort care; on morphine and ativan. Pt anxious, agitated, and trying to get out of bed when meds wear off.

## 2023-05-19 NOTE — DISCHARGE SUMMARY
________________________________________________________________________  Anushka Henning MD     Procedures: see electronic medical records for all procedures/Xrays and details which were not copied into this note but were reviewed prior to creation of Plan. LABS:  I reviewed today's most current labs and imaging studies.   Pertinent labs include:  Recent Labs     05/18/23  1822   WBC 15.8*   HGB 11.2*   HCT 36.4*   *     Recent Labs     05/18/23  1822   *   K 4.6      CO2 24   GLUCOSE 187*   BUN 24*   CREATININE 1.52*   CALCIUM 7.6*   MG 2.1   LABALBU 2.9*   BILITOT 0.3   AST 49*   ALT 37       Signed: Anushka Henning MD

## 2023-05-19 NOTE — ED NOTES
Attempted to call report to LACIE Ryan at this time     Shannon Lakhani RN  05/18/23 [address] [place], RN  05/18/23 0530

## 2023-05-19 NOTE — H&P
PLEASE NOTE: I HAVE GENERATED THIS NOTE WITH THE ASSISTANCE OF VOICE-RECOGNITION TECHNOLOGY. PLEASE EXCUSE ANY SPELLING, GRAMMATICAL, AND SYNTAX ERRORS YOU MAY FIND. IF YOU NEED CLARIFICATION ON ANYTHING, PLEASE REACH OUT TO ME.  [facility] ED [facility]  Hospitalist Group  Hospitalist H&P    Assessment & Plan:     Given the patient's current clinical presentation, I have a high level of concern for decompensation if discharged from the emergency department. Complex decision making was performed, which includes reviewing the patient's available past medical records, laboratory results, and x-ray films. My assessment of this patient's clinical condition and my plan of care is as follows. I had a long conversation with patient's family regarding goals of care. Patient has severe COPD and has been in and out of the hospital multiple times for the same. While here, the patient could not be off the BiPAP for even a few seconds without feeling anxious and sob. The decision was made by all four children to keep the patient on comfort measures. # Acute COPD Exacerbation  - Comfort care order set    Medical Decision Making:   I personally reviewed labs:  CBC, CMP  I personally reviewed imaging: All imaging done on today's admission  Toxic drug monitoring: For any vancomycin orders  Discussed case with:    ED provider. After discussion I am in agreement that acuity of patient's medical condition necessitates hospital stay. Code Status:    DNR  DVT Prophylaxis:   None   GI Prophylaxis:  None                                                                                Subjective:   Primary Care Provider: Lawson Chawla MD  Chief Complaint: SOB    76 y.o. male presents with unknown duration duration of abrupt onset, now resolved cardiac arrest, remitted by CPR, exacerbated by nothing. Further history: Patient was apparently resuscitated at [facility].   He came in very upset because

## 2023-05-19 NOTE — PROGRESS NOTES
[facility]  [phone]     Patient Name: Marija Mac  YOB: 1945  Age: 76 y.o. [facility] RN Note:  Hospice consult received, reviewing chart. Will follow up with Unit Nurse and Care Manager to discuss plan of care, patient status and discharge disposition today. Thank you for the opportunity to be of service to this patient. 13:00: Hospice team has made contact with patient's wife, as listed on ACP Documentation. Wife states she was unaware patient was in the hospital.    14:00: Perfect Serve message sent to attending to clarify. 14:35: Bedside patient and his two daughters, Gary Apodaca and April. 2 grand-daughters also bedside. Pt is [place], however is alert. He is unable to answer orientation questions without assistance. Pt does not seem to be able to make medical decisions, or complete documentation. Pt presents with respiratory distress; labored breathing ,pursed lips, rapid breaths; admits to \"not being able to breath\" Oxygen via NC at 5 liters. Pt is restless, pulling at clothing. Reviewed with family bedside; Hospice team has contacted patient's wife, as per ACP documentation. Family deny having updated ACP documents. Family state that pt's wife was not willing for patient to return home with her post rehab stay in [facility], so patient's daughters brought patient to [facility] and enrolled with Melo ''R'' . Patient was discharged from [facility] on 5/18/2023. If patient admits into Hospice services, this will be second episode and will not require a F2F. Gary Apodaca is listed as secondary MPOA on ACP. Educated family bedside that due to documentation on file, patient's wife is legal MPOA and would need to be included in Hospice decisions. Family state understanding. Plan to review with Dr. Patrica Mixon to assist with symptom management.       15:00: Orders received from Cimzia Pregnancy And Lactation Text: This medication crosses the placenta but can be considered safe in certain situations. Cimzia may be excreted in breast milk.

## 2023-05-20 NOTE — PROGRESS NOTES
Physician Progress Note      Juancarlos Nam  CSN #:                  [id number]  :                       1945  ADMIT DATE:       2023 6:08 PM  [address] [place] DATE:        2023 4:43 PM  RESPONDING  PROVIDER #:        Jaimee Baumann MD          QUERY TEXT:    Patient admitted with SOB, noted to have 85% O2 Sats on RA. Patient noted to   have Hx. of COPD. If possible, please document in progress notes and discharge   summary if you are evaluating and/or treating any of the following: The medical record reflects the following:  Risk Factors: Hx: Prostate CA; CHF; CKD; COPD; HTN; PAF  Clinical Indicators: rr: 22-28; O2 Sats: 85% on RA; 97% on 5L NC. ... Lavanda Candle POC [place]: 1.65; CO2: 24. Lavanda Candle Lavanda Candle ABG: ph: 7.13; pCO2: 70.7; pO2: 113; HCO3: 23.2; sO2:   96.2 on 5L NC. ... Lavanda Candle Lavanda Candle CXR: Stable to slightly increased right lower lobe   infiltrate; Increased diffuse interstitial prominence. .. Lavanda Candle Lavanda Candle ED NOTED: Tachypnea,   accessory muscle usage and respiratory distress present; Capillary refill   takes 2 to 3 seconds. .. Lavanda Candle Lavanda Candle AP NOTED: ?While here, the patient could not be off   the BiPAP for even a few seconds without feeling anxious and sob. The   decision was made by all four children to keep the patient on comfort   measures. Treatment: CXR; Supplemental O2 w/ NC and BiPAP; ABG; Comfort Care    Thank you,    Yuki Cruz  [facility]  Options provided:  -- Hypoxia POA  -- Acute hypoxic respiratory failure POA  -- Acute hypercapnic respiratory failure POA  -- Acute hypoxic and hypercapnic respiratory failure POA  -- COPD exacerbation Only  -- Other - I will add my own diagnosis  -- Disagree - Not applicable / Not valid  -- Disagree - Clinically unable to determine / Unknown  -- Refer to Clinical Documentation Reviewer    PROVIDER RESPONSE TEXT:    This patient has Acute hypoxic and hypercapnic respiratory failure POA.     Query created by: Jasvir Martinez on 2023 11:50 AM      Electronically signed by:  Jaimee Baumann MD

## 2023-07-13 NOTE — WOUND CARE
Reason For Visit    Chief Complaint   Patient presents with   • Office Visit   • Sleep Problem   Marielle Segura is accompanied by parent.      Referred By    Patient was referred by Aarti Isabel MD.     History of Present Illness    5 year old female returns after undergoing uncomplicated adenotonsillectomy 3/17/23.    Recovery went well.  Pain was managed appropriately.  Tolerating general diet.  No issues with bleeding.    Has snoring.  This is very subtle.   No apnea.   Sleep is sound.   No enuresis.     Has been on vacation recently - airplane.   She continues to have discomfort.   No nasal spray at present.      Review of Systems  10 systems were reviewed and are negative aside from those detailed above.     Past Medical History  Significant past medical history:   Past Medical History:   Diagnosis Date   • Enlarged tonsils    • No pertinent past medical history    • Snores      Surgical History  Significant past surgical history:   Past Surgical History:   Procedure Laterality Date   • Remove tonsils/adenoids,<11 y/o  2023     Social History  Custody status: Parents have full custody of the patient: yes     Current Meds  Current Outpatient Medications   Medication Sig Dispense Refill   • acetaminophen (TYLENOL) 160 MG/5ML suspension Take 18.5 mLs by mouth every 6 hours as needed for Fever.     • ibuprofen (CHILDRENS ADVIL) 100 MG/5ML suspension Take 19.7 mLs by mouth every 6 hours as needed for Pain.     • ondansetron (ZOFRAN) 4 MG tablet Take 1 tablet by mouth every 8 hours as needed for Nausea. 15 tablet 0     No current facility-administered medications for this visit.     Vitals    Visit Vitals  Temp 98.3 °F (36.8 °C) (Temporal)   Wt (!) 41.7 kg (91 lb 14.9 oz)     Physical Exam  General:  The patient is awake, alert, NAD.  Head/Face:  Atraumatic, normocephalic. No dysmorphic features.  Eyes:  Conjunctiva normal.   Gaze conjugate.   Ears:  Right:  Serous middle ear fluid.   Left:    Serous  Wound Care Note:        Chart reviewed by wound care due to low randall and risk for skin breakdown    Skin Care & Pressure Relief Recommendations:  Minimize layers of linen  Pads under patient to optimize support surface and microclimate  Turn/reposition approximately every 2 hours. Pillow Wedges  Manage incontinence  Promote continence; Skin Protective lotion to buttocks and sacrum daily and as needed with incontinence care    Offload heels with pillows or offloading boots. Please consult wound care for any skin breakdown or wounds during this hospitalization. middle ear fluid.   Nose:   Anterior rhinoscopy clear. Breathing through nose.   Oral Cavity:  The vestibule is normal appearing. Oral cavity mucosa is without lesion. The tongue is mobile and midline. Hard Palate is intact and without lesions. No ankyloglossia.  Oropharynx:  Soft palate elevates in the midline. Uvula normal. Tonsils small, symmetric. Base of tongue soft without lesions. Posterior oropharyngeal wall clear.  Larynx:   No stridor. Normal voice.  Neck:  normal surface anatomy. Laryngeal landmarks are palpable. Thyroid normal to palpation. No masses. No cervical lymphadenopathy. Range of motion normal.  Lymphatic:  No cervical lymphadenopathy.  Neuro:  Alert, no focal deficits.     Assessment  Problem List Items Addressed This Visit    None  Visit Diagnoses     Bilateral serous otitis media, unspecified chronicity    -  Primary        Discussion/Summary   this patient has no significant sleep disordered breathing after adenotonsillectomy, has healed well, and needs no other work-up or treatment for her airway at this point.    She does have bilateral serous middle ear effusions after recent plane travel.  Should use Flonase, anticipate resolution relatively quickly.        Scottie Cho MD

## 2023-09-02 NOTE — PROGRESS NOTES
Hospitalist Progress Note    Patient: Jefferson Rosales MRN: [id number]  CSN: [id number]    YOB: 1945  Age: 74 y.o. Sex: male    DOA: 7/6/2019 LOS:  LOS: 2 days                Assessment/Plan     Patient Active Problem List   Diagnosis Code    COPD (chronic obstructive pulmonary disease) ([address] J44.9    Hypertension I10    Tobacco abuse Z72.0    COPD (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 (HCC) J44.9    Chronic renal disease, stage 3, moderately decreased glomerular filtration rate between 30-59 mL/min/1.73 square meter (HCC) N18.3    COPD exacerbation ([address] J44.1    Asbestosis ([address] J61    Acute exacerbation of chronic obstructive pulmonary disease (COPD) ([address] J44.1    Pneumonia J18.9    COPD with acute exacerbation ([address] J44.1    Acute respiratory failure with hypoxia ([address] J96.01            75 y/o male with COPD (not on chronic oxygen) who is admitted for acute COPD exacerbation in the setting on RLL PNA. He was started on BiPAP, however his respiratory status worse last night and he required to be intubated. Extubated this morning, awake and alert, denies any complains. On oxygen by NC.      CRITICAL CARE PLAN    Resp -   Acute respiratory failure - extubated 07/08/2019, BiPAP as needed. Acute exacerbation of COPD - on solumedrol, pulmicort and duo neb treatment. CTA chest with no evidence of PE  LLL consolidation vs atelectasis  Chronic peripherally calcified right pleural effusion. ID - blood cultures no growth to date. RLL pneumonia   ANTIBIOTICS - vancomycin, zosyn, levaquin. CVS - Monitor HD. Cardiac enzymes negative    Heme/onc - Follow H&H, plts. .    Renal - Trend BUN, Cr, follow I/O. Check and replace Mg, K, phos. Endocrine -  Follow FSG  DM - on SSI    GI - SLP eval and diet per SLP. Prophylaxis - DVT: heparin, GI: protonix      Disposition : 2-3 days    Review of systems  General: No fevers or chills.   Cardiovascular: No chest pain or pressure. No palpitations. Pulmonary: see above   Gastrointestinal: No nausea, vomiting. Physical Exam:  General: Awake, cooperative, no acute distress    HEENT: NC, Atraumatic. PERRLA, anicteric sclerae. Lungs: Decreased breath sounds bilaterally, crackles lower lungs bilaterally. Heart:  S1 S2,  No murmur, No Rubs, No Gallops  Abdomen: Soft, Non distended, Non tender.  +Bowel sounds,   Extremities: No c/c/e  Psych:   Not anxious or agitated. Neurologic:  No acute neurological deficit. Vital signs/Intake and Output:  Visit Vitals  /88   Pulse 99   Temp 98.1 °F (36.7 °C)   Resp 29   Ht 5' 8\" (1.727 m)   Wt 83.9 kg (185 lb)   SpO2 97%   BMI 28.13 kg/m²     Current Shift:  07/08 0701 - 07/08 1900  In: -   Out: 5375 [CTBBS:0587]  Last three shifts:  07/06 1901 - 07/08 0700  In: 7046.1 [I.V.:7046.1]  Out: 4200 [Urine:4200]            Labs: Results:       Chemistry Recent Labs     07/08/19  0320 07/07/19 0225 07/06/19  0546   * 146* 142*    132* 132*   K 3.1* 4.3 4.0    99* 99*   CO2 22 22 25   BUN 22* 25* 21*   CREA 1.11 1.35* 1.17   CA 8.2* 8.7 8.6   AGAP 9 11 8   BUCR 20 19 18   AP  --   --  87   TP  --   --  6.1*   ALB  --   --  3.2*   GLOB  --   --  2.9   AGRAT  --   --  1.1      CBC w/Diff Recent Labs     07/08/19  0320 07/07/19 0225 07/06/19  0546   WBC 9.9 7.4 10.1   RBC 4.03* 4.21* 4.20*   HGB 11.2* 11.8* 11.9*   HCT 33.2* 34.3* 34.9*    327 287   GRANS  --   --  66   LYMPH  --   --  14*   EOS  --   --  11*      Cardiac Enzymes Recent Labs     07/07/19  0400 07/06/19  0546    85   CKND1 6.9* 4.7*      Coagulation Recent Labs     07/08/19  0320 07/07/19  0400   PTP 12.2 12.4   INR 0.9 1.0       Lipid Panel No results found for: CHOL, CHOLPOCT, CHOLX, CHLST, CHOLV, 644141, HDL, LDL, LDLC, DLDLP, 434259, VLDLC, VLDL, TGLX, TRIGL, TRIGP, TGLPOCT, CHHD, CHHDX   BNP No results for input(s): BNPP in the last 72 hours.    Liver Enzymes Recent Labs     07/06/19  0546   TP 6.1*   ALB 3.2*   AP 87   SGOT 12*      Thyroid Studies No results found for: T4, T3U, TSH, TSHEXT     Procedures/imaging: see electronic medical records for all procedures/Xrays and details which were not copied into this note but were reviewed prior to creation of Plan The patient is a 56y Female complaining of chest pressure.

## 2023-09-22 NOTE — DISCHARGE INSTRUCTIONS
Patient Education        COPD Exacerbation Plan: Care Instructions  Your Care Instructions     If you have chronic obstructive pulmonary disease (COPD), your usual shortness of breath could suddenly get worse. You may start coughing more and have more mucus. This flare-up is called a COPD exacerbation (say \"[id number]\"). A lung infection or air pollution could set off an exacerbation. Sometimes it can happen after a quick change in temperature or being around chemicals. Work with your doctor to make a plan for dealing with an exacerbation. You can better manage it if you plan ahead. Follow-up care is a key part of your treatment and safety. Be sure to make and go to all appointments, and call your doctor if you are having problems. It's also a good idea to know your test results and keep a list of the medicines you take. How can you care for yourself at home? During an exacerbation  · Do not panic if you start to have one. Quick treatment at home may help you prevent serious breathing problems. If you have a COPD exacerbation plan that you developed with your doctor, follow it. · Take your medicines exactly as your doctor tells you.  ? Use your inhaler as directed by your doctor. If your symptoms do not get better after you use your medicine, have someone take you to the emergency room. Call an ambulance if necessary. ? With inhaled medicines, a spacer or a nebulizer may help you get more medicine to your lungs. Ask your doctor or pharmacist how to use them properly. Practice using the spacer in front of a mirror before you have an exacerbation. This may help you get the medicine into your lungs quickly. ? If your doctor has given you steroid pills, take them as directed. ? Your doctor may have given you a prescription for antibiotics, which you can fill if you need it. ? Talk to your doctor if you have any problems with your medicine.  And call your doctor if you have to use your antibiotic or steroid pills. Preventing an exacerbation  · Do not smoke. This is the most important step you can take to prevent more damage to your lungs and prevent problems. If you already smoke, it is never too late to stop. If you need help quitting, talk to your doctor about stop-smoking programs and medicines. These can increase your chances of quitting for good. · Take your daily medicines as prescribed. · Avoid colds and flu. ? Get a pneumococcal vaccine. ? Get a flu vaccine each year, as soon as it is available. Ask those you live or work with to do the same, so they will not get the flu and infect you. ? Try to stay away from people with colds or the flu. ? Wash your hands often. · Avoid secondhand smoke; air pollution; cold, dry air; hot, humid air; and high altitudes. Stay at home with your windows closed when air pollution is bad. · Learn breathing techniques for COPD, such as breathing through pursed lips. These techniques can help you breathe easier during an exacerbation. When should you call for help? Call 911 anytime you think you may need emergency care. For example, call if:    · You have severe trouble breathing.     · You have severe chest pain. Call your doctor now or seek immediate medical care if:    · You have new or worse shortness of breath.     · You develop new chest pain.     · You are coughing more deeply or more often, especially if you notice more mucus or a change in the color of your mucus.     · You cough up blood.     · You have new or increased swelling in your legs or belly.     · You have a fever. Watch closely for changes in your health, and be sure to contact your doctor if:    · You need to use your antibiotic or steroid pills.     · Your symptoms are getting worse. Where can you learn more? Go to http://www.gray.com/  Enter U536 in the search box to learn more about \"COPD Exacerbation Plan: Care Instructions. \"  Current as of: October 26, 2020               Content Version: 12.8  © 2006-2021 [employer]. Care instructions adapted under license by [employer] (which disclaims liability or warranty for this information). If you have questions about a medical condition or this instruction, always ask your healthcare professional. [address] any warranty or liability for your use of this information. Patient Education        Chronic Obstructive Pulmonary Disease (COPD): Care Instructions  Your Care Instructions     Chronic obstructive pulmonary disease (COPD) is a general term for a group of lung diseases, including emphysema and chronic bronchitis. People with COPD have decreased airflow in and out of the lungs, which makes it hard to breathe. The airways also can get clogged with thick mucus. Cigarette smoking is a major cause of COPD. Although there is no cure for COPD, you can slow its progress. Following your treatment plan and taking care of yourself can help you feel better and live longer. Follow-up care is a key part of your treatment and safety. Be sure to make and go to all appointments, and call your doctor if you are having problems. It's also a good idea to know your test results and keep a list of the medicines you take. How can you care for yourself at home? Staying healthy    · Do not smoke. This is the most important step you can take to prevent more damage to your lungs. If you need help quitting, talk to your doctor about stop-smoking programs and medicines. These can increase your chances of quitting for good.     · Avoid colds and flu. Get a pneumococcal vaccine shot. If you have had one before, ask your doctor whether you need a second dose. Get the flu vaccine every fall. If you must be around people with colds or the flu, wash your hands often.     · Avoid secondhand smoke, air pollution, and high altitudes. Also avoid cold, dry air and hot, humid air.  Stay at home with your windows closed when air pollution is bad. Medicines and oxygen therapy    · Take your medicines exactly as prescribed. Call your doctor if you think you are having a problem with your medicine. You may be taking medicines such as:  ? Bronchodilators. These help open your airways and make breathing easier. They are either short-acting (work for 6 to 9 hours) or long-acting (work for 24 hours). You inhale most bronchodilators, so they start to act quickly. Always carry your quick-relief inhaler with you in case you need it while you are away from home. ? Corticosteroids (prednisone, budesonide). These reduce airway inflammation. They come in pill or inhaled form. You must take these medicines every day for them to work well.     · Ask your doctor or pharmacist if a spacer is right for you. A spacer may help you get more inhaled medicine to your lungs. If you use one, ask how to use it properly.     · Do not take any vitamins, over-the-counter medicine, or herbal products without talking to your doctor first.     · If your doctor prescribed antibiotics, take them as directed. Do not stop taking them just because you feel better. You need to take the full course of antibiotics.     · If you use oxygen therapy, use the flow rate your doctor has recommended. Don't change it without talking to your doctor first. Oxygen therapy boosts the amount of oxygen in your blood and helps you breathe easier. Activity    · Get regular exercise. Walking is an easy way to get exercise. Start out slowly, and walk a little more each day.     · Pay attention to your breathing.  You are exercising too hard if you can't talk while you exercise.     · Take short rest breaks when doing household chores and other activities.     · Learn breathing methods--such as breathing through pursed lips--to help you become less short of breath.     · If your doctor has not set you up with a pulmonary rehabilitation program, ask if rehab is right for you. Rehab includes exercise programs, education about your disease and how to manage it, help with diet and other changes, and emotional support. Diet    · Eat regular, healthy meals. Use bronchodilators about 1 hour before you eat to make it easier to eat. Eat several small meals instead of three large ones. Drink beverages at the end of the meal. Avoid foods that are hard to chew.     · Eat foods that contain protein so you don't lose muscle mass.     · Talk with your doctor if you gain too much weight or if you lose weight without trying. Mental health    · Talk to your family, friends, or a therapist about your feelings. Some people feel frightened, angry, hopeless, helpless, and even guilty. Talking openly about bad feelings can help you cope. If these feelings last, talk to your doctor. When should you call for help? Call 911 anytime you think you may need emergency care. For example, call if:    · You have severe trouble breathing. Call your doctor now or seek immediate medical care if:    · You have new or worse trouble breathing.     · You cough up blood.     · You have a fever. Watch closely for changes in your health, and be sure to contact your doctor if:    · You cough more deeply or more often, especially if you notice more mucus or a change in the color of your mucus.     · You have new or worse swelling in your legs or belly.     · You are not getting better as expected. Where can you learn more? Go to http://www.gray.com/  Enter O637 in the search box to learn more about \"Chronic Obstructive Pulmonary Disease (COPD): Care Instructions. \"  Current as of: October 26, 2020               Content Version: 12.8  © 5172-3486 [employer]. Care instructions adapted under license by [employer] (which disclaims liability or warranty for this information).  If you have questions about a medical condition or this instruction, always ask your healthcare professional. [address] any warranty or liability for your use of this information. Patient Education        Learning About COPD, Asthma, and Air Pollution  How does air pollution affect COPD and asthma? When you have COPD or asthma, air pollution may make your symptoms worse. If it does, it means that air pollution is a trigger for you. It is important to know what your triggers are and how to deal with them. If air pollution is a trigger for you, you need to learn about air quality and pay attention to weather forecasts that include how bad the air is expected to be. How can you manage a flare-up caused by air pollution? · Don't panic. Quick treatment at home may help you prevent serious breathing problems. · Take your medicines exactly as your doctor tells you.  ? Use your quick-relief inhaler as directed by your doctor. If your symptoms don't get better after you use your medicine, have someone take you to the emergency room. Call an ambulance if necessary. ? With inhaled medicines, a spacer or a nebulizer may help you get more medicine to your lungs. Ask your doctor or pharmacist how to use them the right way. Practice using the spacer in front of a mirror before you have a flare-up. This may help you get the medicine into your lungs quickly. ? If your doctor has given you steroid pills, take them as directed. ? Talk to your doctor if you have any problems with your medicine. How can you prevent flare-ups? · Try not to be outside when air pollution levels are high. Stay at home with your windows closed. · Don't smoke. This is the most important step you can take to prevent more damage to your lungs and prevent problems. If you already smoke, it's never too late to stop. If you need help quitting, talk to your doctor about stop-smoking programs and medicines. These can increase your chances of quitting for good. · Avoid secondhand smoke.   · Take your daily medicines as prescribed. Follow-up care is a key part of your treatment and safety. Be sure to make and go to all appointments, and call your doctor if you are having problems. It's also a good idea to know your test results and keep a list of the medicines you take. Where can you learn more? Go to http://www.gray.com/  Enter B312 in the search box to learn more about \"Learning About COPD, Asthma, and Air Pollution. \"  Current as of: October 26, 2020               Content Version: 12.8  © 0993-0867 [employer]. Care instructions adapted under license by [employer] (which disclaims liability or warranty for this information). If you have questions about a medical condition or this instruction, always ask your healthcare professional. [place] [address] any warranty or liability for your use of this information. DISCHARGE SUMMARY from Nurse    PATIENT INSTRUCTIONS:    After general anesthesia or intravenous sedation, for 24 hours or while taking prescription Narcotics:  · Limit your activities  · Do not drive and operate hazardous machinery  · Do not make important personal or business decisions  · Do  not drink alcoholic beverages  · If you have not urinated within 8 hours after discharge, please contact your surgeon on call. Report the following to your surgeon:  · Excessive pain, swelling, redness or odor of or around the surgical area  · Temperature over 100.5  · Nausea and vomiting lasting longer than 4 hours or if unable to take medications  · Any signs of decreased circulation or nerve impairment to extremity: change in color, persistent  numbness, tingling, coldness or increase pain  · Any questions    What to do at Home:  Recommended activity: Activity as tolerated. *  Please give a list of your current medications to your Primary Care Provider.     *  Please update this list whenever your medications are discontinued, doses are      changed, or new medications (including over-the-counter products) are added. *  Please carry medication information at all times in case of emergency situations. These are general instructions for a healthy lifestyle:    No smoking/ No tobacco products/ Avoid exposure to second hand smoke  Surgeon General's Warning:  Quitting smoking now greatly reduces serious risk to your health. Obesity, smoking, and sedentary lifestyle greatly increases your risk for illness    A healthy diet, regular physical exercise & weight monitoring are important for maintaining a healthy lifestyle    You may be retaining fluid if you have a history of heart failure or if you experience any of the following symptoms:  Weight gain of 3 pounds or more overnight or 5 pounds in a week, increased swelling in our hands or feet or shortness of breath while lying flat in bed. Please call your doctor as soon as you notice any of these symptoms; do not wait until your next office visit. The discharge information has been reviewed with the patient. The patient verbalized understanding. Discharge medications reviewed with the patient and appropriate educational materials and side effects teaching were provided.   ___________________________________________________________________________________________________________________________________ Advancement Flap (Single) Text: The defect edges were debeveled with a #15 scalpel blade.  Given the location of the defect and the proximity to free margins a single advancement flap was deemed most appropriate.  Using a sterile surgical marker, an appropriate advancement flap was drawn incorporating the defect and placing the expected incisions within the relaxed skin tension lines where possible.    The area thus outlined was incised deep to adipose tissue with a #15 scalpel blade.  The skin margins were undermined to an appropriate distance in all directions utilizing iris scissors.

## 2023-09-25 NOTE — PROGRESS NOTES
Problem: Self Care Deficits Care Plan (Adult)  Goal: *Acute Goals and Plan of Care (Insert Text)  Description: Occupational Therapy Goals  Initiated 8/25/2021 within 7 day(s). 1.  Patient will perform grooming with minimal assistance/contact guard assist seated EOB. 2.  Patient will perform upper body dressing with minimal assistance/contact guard assist.  3.  Patient will participate in upper extremity therapeutic exercise/activities with supervision/set-up for 10 minutes. 4.  Patient will utilize energy conservation techniques during functional activities with verbal, visual, and tactile cues. OCCUPATIONAL THERAPY EVALUATION    Patient: Jason Lee (87 y.o. male)  Date: 8/25/2021  Primary Diagnosis: Osteomyelitis of left ankle (HCC) [M86.9]  Procedure(s) (LRB):  INCISION AND DRAINAGE, DEBRIDEMENT AND APPLICATION OF STRAVIX SKIN GRAFT OF LEFT ANKLE AND FOOT  WOUND  (Left) 2 Days Post-Op   Precautions:   Fall, NWB (L LE)  PLOF: pt has been bed bound at [facility] from previous admission in July 2021    ASSESSMENT :  Based on the objective data described below, the patient presents with decreased strength, endurance and balance for carryover of ADLs and tranfers following above mentioned surgical procedure. Pt co-treat with PT for the need of another set of skilled hands and safety with transfers/ADLs. Pt presented supine in bed at the beginning of session. Pt reports no pain AT lle at rest but painful with mobility. No KI present in room currently thus unable to perform OOB mobility at this time. Pt requires maxA to reposition and scoot up towards [facility] during this session. L LE propped with pillows to prevent pressure at L heel and ankle. HEP provided to improve UB and core strength for carryover with transfers and OOB activity. Pt demo understanding for the same. Pt was left in supine in bed at the end of session in [facility].      Patient will benefit from skilled intervention to address the above impairments. Patient's rehabilitation potential is considered to be Fair  Factors which may influence rehabilitation potential include:   []             None noted  []             Mental ability/status  [x]             Medical condition  []             Home/family situation and support systems  []             Safety awareness  []             Pain tolerance/management  []             Other:      PLAN :  Recommendations and Planned Interventions:   [x]               Self Care Training                  [x]      Therapeutic Activities  []               Functional Mobility Training   []      Cognitive Retraining  [x]               Therapeutic Exercises           [x]      Endurance Activities  [x]               Balance Training                    []      Neuromuscular Re-Education  []               Visual/Perceptual Training     [x]      Home Safety Training  [x]               Patient Education                   [x]      Family Training/Education  []               Other (comment):    Frequency/Duration: Patient will be followed by occupational therapy 3 times a week to address goals. Discharge Recommendations: Rehab  Further Equipment Recommendations for Discharge: N/A     SUBJECTIVE:   Patient stated  I am tired of being stuck in the bed since July. I want to go home now.     OBJECTIVE DATA SUMMARY:     Past Medical History:   Diagnosis Date    Brain aneurysm     Cancer ([facility] [address]     prostate    COPD (chronic obstructive pulmonary disease) (HCC)     Hypertension     Nocturia     On home oxygen therapy     Pneumonia     Prostate neoplasm     Radiation effect      Past Surgical History:   Procedure Laterality Date    HX HERNIA REPAIR      HX HIP ARTHROSCOPY  04/09/2021     Barriers to Learning/Limitations: None  Compensate with: visual, verbal, tactile, kinesthetic cues/model    Home Situation:   Home Situation  Home Environment: Private residence  # Steps to Enter: 6  Rails to Enter: Yes  [employer] : Bilateral  One/Two Story Residence: One story  Living Alone: No  Support Systems: Spouse/Significant Other/Partner  Patient Expects to be Discharged to[de-identified] Rehabilitation facility  Current DME Used/Available at Home: Aminata Maki, straight, Walker, rolling  []  Right hand dominant   []  Left hand dominant    Cognitive/Behavioral Status:  Neurologic State: Alert; Appropriate for age  Orientation Level: Oriented X4  Cognition: Follows commands  Safety/Judgement: Awareness of environment    Skin: intact  Edema: none    Vision/Perceptual:    Tracking: Able to track stimulus in all quadrants w/o difficulty    Coordination: BUE  Coordination: Within functional limits  Fine Motor Skills-Upper: Left Intact; Right Intact    Gross Motor Skills-Upper: Left Intact; Right Intact  Strength: BUE  Strength: Generally decreased, functional  Tone & Sensation: BUE  Tone: Normal  Sensation: Intact  Range of Motion: BUE  AROM: Generally decreased, functional  Functional Mobility and Transfers for ADLs:  Bed Mobility:  Scooting: Total assistance;Assist x2  ADL Assessment:   Feeding: Setup    Oral Facial Hygiene/Grooming: Setup    Upper Body Dressing: Minimum assistance    Lower Body Dressing: Total assistance    Toileting: Total assistance  ADL Intervention:  Cognitive Retraining  Safety/Judgement: Awareness of environment    Therapeutic Exercise:  HEP provided for BUEs to maintain UE strength and overall endurance for carryover of ADLs and transfers with safety. Pt demo understanding for the same. Pain:  Pain level pre-treatment: 6/10   Pain level post-treatment: 6/10   Pain Intervention(s): Medication (see MAR); Rest, Ice, Repositioning   Response to intervention: Nurse notified, See doc flow    Activity Tolerance:   Poor     Please refer to the flowsheet for vital signs taken during this treatment.   After treatment:   [] Patient left in no apparent distress sitting up in chair  [x] Patient left in no apparent distress in bed  [x] Call bell left within reach  [x] Nursing notified  [] Caregiver present  [] Bed alarm activated    COMMUNICATION/EDUCATION:   [x] Role of Occupational Therapy in the acute care setting  [x] Home safety education was provided and the patient/caregiver indicated understanding. [x] Patient/family have participated as able in goal setting and plan of care. [x] Patient/family agree to work toward stated goals and plan of care. [] Patient understands intent and goals of therapy, but is neutral about his/her participation. [] Patient is unable to participate in goal setting and plan of care. Thank you for this referral.  Abhinav Hutchinson OTR/GLENN  Time Calculation: 24 mins    Eval Complexity: History: MEDIUM Complexity : Expanded review of history including physical, cognitive and psychosocial  history ; Examination: MEDIUM Complexity : 3-5 performance deficits relating to physical, cognitive , or psychosocial skils that result in activity limitations and / or participation restrictions; Decision Making:MEDIUM Complexity : Patient may present with comorbidities that affect occupational performnce.  Miniml to moderate modification of tasks or assistance (eg, physical or verbal ) with assesment(s) is necessary to enable patient to complete evaluation Detail Level: Zone Initiate Treatment: -\\nclobetasol 0.05 % topical ointment BID Render In Strict Bullet Format?: No Initiate Treatment: Hydrocortisone 2.5% ointment

## 2023-11-07 NOTE — ROUTINE PROCESS
Bedside and Verbal shift change report given to EDWIN Campos RN (oncoming nurse) by [facility] RN (offgoing nurse). Report included the following information SBAR, Kardex, Intake/Output and MAR. Chart review, exam, documentation, d/w team.

## 2023-11-30 NOTE — PROGRESS NOTES
Problem: Chronic Obstructive Pulmonary Disease (COPD)  Goal: *Oxygen saturation during activity within specified parameters  Outcome: Progressing Towards Goal  Goal: *Able to remain out of bed as prescribed  Outcome: Progressing Towards Goal  Goal: *Absence of hypoxia  Outcome: Progressing Towards Goal  Goal: *Optimize nutritional status  Outcome: Progressing Towards Goal     Problem: Patient Education: Go to Patient Education Activity  Goal: Patient/Family Education  Outcome: Progressing Towards Goal     Problem: Patient Education: Go to Patient Education Activity  Goal: Patient/Family Education  Outcome: Progressing Towards Goal     Problem: Pain  Goal: *Control of Pain  Outcome: Progressing Towards Goal  Goal: *PALLIATIVE CARE:  Alleviation of Pain  Outcome: Progressing Towards Goal     Problem: Fluid Volume - Risk of, Imbalanced  Goal: *Balanced intake and output  Outcome: Progressing Towards Goal No

## 2024-03-10 NOTE — PROGRESS NOTES
2143 The patient has request some cough medicine. He has been coughing frequently and is coughing up large amounts of sputum. Called and spoke to Dr Kris Vazquez. Received an order for 10mg PO robitussin q4h prn cough and tussinex 5ml PO q12 prn cough. 2149 The patient received a dose of tussinex in the ED around 1700 today. Next dose cannot be taken until 0500. Administered 10ml PO robitussin as ordered prn.     2310 The patient continues to cough frequently and is sitting on the side of the bed.     2340 The patient is still sitting on the side of the bed coughing periodically but appears sleepy. Assisted him back into bed for safety. 0100 The patient has been resting more comfortably with less coughing. 0500 Pt resting in bed with eyes closed. Breathing even and unlabored. No s/s of distress of any kind. Call bell within reach.
Chart reviewed by CM on call. Noted pt was observation status. Pt was in hospital less than 24 hours and was discharged prior to receiving obs/moon letter.
Patient arrived to floor from ED. Patient alert and oriented. Patient stable upon arrival. Will continue to monitor.
Back pain

## 2024-04-30 NOTE — PROGRESS NOTES
Bedside shift change report given to Rochelle Leyden (oncoming nurse) by Ann Marie Grant (offgoing nurse). Report included the following information SBAR, Kardex, MAR and Recent Results. No

## 2024-04-30 NOTE — ED NOTES
I have reviewed discharge instructions with the patient and caregiver. The patient and caregiver verbalized understanding. Patient armband removed and shredded    Pt was picked up by Son in law and transported home. 1 pair

## 2025-03-31 NOTE — DIABETES MGMT
GLYCEMIC CONTROL PROGRESS NOTE:    - no known h/o DM  - Solumedrol 40 mg Q8 hours, steroid associated hyperglycemia  - TDD = 6 units - Humalog Normal Insulin Sensitivity Corrective Coverage, recommend continue    Recent Glucose Results:   Lab Results   Component Value Date/Time     (H) 02/03/2021 07:30 AM    GLUCPOC 135 (H) 02/03/2021 08:02 AM    GLUCPOC 138 (H) 02/02/2021 08:24 PM    GLUCPOC 207 (H) 02/02/2021 05:41 PM     Octavio Pryor MS, RN, CDE  Glycemic Control Team  [phone]  Pager [phone] (M-TH 8:00-4:30P)  *After Hours pager [phone] Resident/Fellow

## (undated) DEVICE — SUT MONOCRYL PLUS UD 4-0 --

## (undated) DEVICE — TUBING, SUCTION, 1/4" X 12', STRAIGHT: Brand: MEDLINE

## (undated) DEVICE — BANDAGE,GAUZE,BULKEE II,4.5"X4.1YD,STRL: Brand: MEDLINE

## (undated) DEVICE — FORCEPS BX CAP 240CM L RAD JAW 4

## (undated) DEVICE — BANDAGE COMPR W4INXL10YD WHITE/BEIGE E MTRX HK LOOP CLSR

## (undated) DEVICE — SUTURE VCRL SZ 3-0 L27IN ABSRB UD L24MM PS-1 3/8 CIR PRIM J936H

## (undated) DEVICE — SPONGE GZ W4XL4IN COT 12 PLY TYP VII WVN C FLD DSGN

## (undated) DEVICE — VERSAJET II HYDROSURGERY SYSTEM HANDSET, 45DEG 14MM EXACT: Brand: VERSAJET

## (undated) DEVICE — PACK PROCEDURE SURG EXTREMITY CUST

## (undated) DEVICE — PAD,ABDOMINAL,5"X9",ST,LF,25/BX: Brand: MEDLINE INDUSTRIES, INC.

## (undated) DEVICE — MOUTHPIECE ENDOSCP 20X27MM --

## (undated) DEVICE — MAJ-1414 SINGLE USE ADPATER BIOPSY VALV: Brand: SINGLE USE ADAPTOR BIOPSY VALVE

## (undated) DEVICE — TRAP SPEC COLL POLYP POLYSTYR --

## (undated) DEVICE — SUT VCRL + 1 36IN CT1 VIO --

## (undated) DEVICE — OSCILLATING TIP SAW CARTRIDGE: Brand: PRECISION FALCON

## (undated) DEVICE — STRAP,POSITIONING,KNEE/BODY,FOAM,4X60": Brand: MEDLINE

## (undated) DEVICE — STRIP SKIN CLSR W0.25XL4IN WHT SPUNBOUND FBR NYL HI ADH

## (undated) DEVICE — WATERPROOF, BACTERIA PROOF DRESSING WITH ABSORBENT SEE THROUGH PAD: Brand: OPSITE POST-OP VISIBLE 15X10CM CTN 20

## (undated) DEVICE — STERILE POLYISOPRENE POWDER-FREE SURGICAL GLOVES WITH EMOLLIENT COATING: Brand: PROTEXIS

## (undated) DEVICE — SYSTEM SKIN CLSR 22CM DERMBND PRINEO

## (undated) DEVICE — SSC BONE WAX: Brand: SSC BONE WAX

## (undated) DEVICE — STERILE POLYISOPRENE POWDER-FREE SURGICAL GLOVES: Brand: PROTEXIS

## (undated) DEVICE — TUBERCULIN SAFETY SYRINGE WITH NEEDLE: Brand: MONOJECT

## (undated) DEVICE — SUT MONOCRYL PLUS UD 3-0 --

## (undated) DEVICE — MASTISOL ADHESIVE LIQ 2/3ML

## (undated) DEVICE — THE CANADY HYBRID PLASMA SCALPEL IS AN ELECTROSURGICAL PLASMA SCALPEL THAT USES AN 85MM BENDABLE PADDLE BLADE TIP. THE ELECTROSURGICAL PLASMA SCALPEL IS USED TO SIMULTANEOUSLY CUT AND COAGULATE BIOLOGICAL TISSUE.: Brand: CANADY HYBRID PLASMA PADDLE BLADE

## (undated) DEVICE — SUT ETHLN 5-0 18IN PC1 BLK --

## (undated) DEVICE — GARMENT,MEDLINE,DVT,INT,CALF,MED, GEN2: Brand: MEDLINE

## (undated) DEVICE — Device

## (undated) DEVICE — NDL SPNE QNCKE 18GX3.5IN LF --

## (undated) DEVICE — SWAB CULT LIQ STUART AGR AERB MOD IN BRK SGL RAYON TIP PLAS 220099] BECTON DICKINSON MICRO]

## (undated) DEVICE — SYR LR LCK 1ML GRAD NSAF 30ML --

## (undated) DEVICE — SINGLE PORT MANIFOLD: Brand: NEPTUNE 2

## (undated) DEVICE — SWAB CULT SGL AMIES W/O CHAR FOR THRT VAG SKIN HRT CULTSWAB

## (undated) DEVICE — SOLUTION IRRIG 3000ML LAC R FLX CONT

## (undated) DEVICE — GOWN,AURORA,NONRNF,XL,30/CS: Brand: MEDLINE

## (undated) DEVICE — KENDALL RADIOLUCENT FOAM MONITORING ELECTRODE RECTANGULAR SHAPE: Brand: KENDALL

## (undated) DEVICE — PREP SKN CHLRAPRP APL 26ML STR --

## (undated) DEVICE — TOWEL,OR,DSP,ST,BLUE,STD,4/PK,20PK/CS: Brand: MEDLINE

## (undated) DEVICE — ZIMMER® STERILE DISPOSABLE TOURNIQUET CUFF WITH PROTECTIVE SLEEVE AND PLC, SINGLE PORT, SINGLE BLADDER, 34 IN. (86 CM)

## (undated) DEVICE — PACK PROCEDURE SURG ANTR HIP

## (undated) DEVICE — HANDPIECE SET WITH HIGH FLOW TIP AND SUCTION TUBE: Brand: INTERPULSE

## (undated) DEVICE — DRESSING PETRO W3XL8IN N ADH OIL EMUL GZ CURAD

## (undated) DEVICE — SOL IRRIGATION INJ NACL 0.9% 500ML BTL

## (undated) DEVICE — REM POLYHESIVE ADULT PATIENT RETURN ELECTRODE: Brand: VALLEYLAB

## (undated) DEVICE — SUT VCRL + 2-0 27IN CT2 UD -- 36/BX

## (undated) DEVICE — INTENDED FOR TISSUE SEPARATION, AND OTHER PROCEDURES THAT REQUIRE A SHARP SURGICAL BLADE TO PUNCTURE OR CUT.: Brand: BARD-PARKER ® CARBON RIB-BACK BLADES

## (undated) DEVICE — 4-PORT MANIFOLD: Brand: NEPTUNE 2

## (undated) DEVICE — SUTURE ETHLN SZ 4-0 L18IN NONABSORBABLE BLK L19MM PS-2 3/8 1667H